# Patient Record
Sex: MALE | Race: BLACK OR AFRICAN AMERICAN | NOT HISPANIC OR LATINO | URBAN - METROPOLITAN AREA
[De-identification: names, ages, dates, MRNs, and addresses within clinical notes are randomized per-mention and may not be internally consistent; named-entity substitution may affect disease eponyms.]

---

## 2018-03-10 ENCOUNTER — INPATIENT (INPATIENT)
Facility: HOSPITAL | Age: 63
LOS: 112 days | DRG: 4 | End: 2018-07-01
Attending: INTERNAL MEDICINE | Admitting: INTERNAL MEDICINE
Payer: MEDICAID

## 2018-03-10 VITALS
RESPIRATION RATE: 16 BRPM | SYSTOLIC BLOOD PRESSURE: 92 MMHG | DIASTOLIC BLOOD PRESSURE: 68 MMHG | HEART RATE: 139 BPM | OXYGEN SATURATION: 97 % | TEMPERATURE: 99 F

## 2018-03-10 DIAGNOSIS — M10.9 GOUT, UNSPECIFIED: ICD-10-CM

## 2018-03-10 DIAGNOSIS — D63.1 ANEMIA IN CHRONIC KIDNEY DISEASE: ICD-10-CM

## 2018-03-10 DIAGNOSIS — I50.23 ACUTE ON CHRONIC SYSTOLIC (CONGESTIVE) HEART FAILURE: ICD-10-CM

## 2018-03-10 DIAGNOSIS — Z51.5 ENCOUNTER FOR PALLIATIVE CARE: ICD-10-CM

## 2018-03-10 DIAGNOSIS — E87.2 ACIDOSIS: ICD-10-CM

## 2018-03-10 DIAGNOSIS — I63.9 CEREBRAL INFARCTION, UNSPECIFIED: ICD-10-CM

## 2018-03-10 DIAGNOSIS — L98.499 NON-PRESSURE CHRONIC ULCER OF SKIN OF OTHER SITES WITH UNSPECIFIED SEVERITY: ICD-10-CM

## 2018-03-10 DIAGNOSIS — Z95.810 PRESENCE OF AUTOMATIC (IMPLANTABLE) CARDIAC DEFIBRILLATOR: ICD-10-CM

## 2018-03-10 DIAGNOSIS — I25.2 OLD MYOCARDIAL INFARCTION: ICD-10-CM

## 2018-03-10 DIAGNOSIS — R14.0 ABDOMINAL DISTENSION (GASEOUS): ICD-10-CM

## 2018-03-10 DIAGNOSIS — Z66 DO NOT RESUSCITATE: ICD-10-CM

## 2018-03-10 DIAGNOSIS — R06.82 TACHYPNEA, NOT ELSEWHERE CLASSIFIED: ICD-10-CM

## 2018-03-10 DIAGNOSIS — M72.6 NECROTIZING FASCIITIS: ICD-10-CM

## 2018-03-10 DIAGNOSIS — A41.9 SEPSIS, UNSPECIFIED ORGANISM: ICD-10-CM

## 2018-03-10 DIAGNOSIS — G93.41 METABOLIC ENCEPHALOPATHY: ICD-10-CM

## 2018-03-10 DIAGNOSIS — I25.10 ATHEROSCLEROTIC HEART DISEASE OF NATIVE CORONARY ARTERY WITHOUT ANGINA PECTORIS: ICD-10-CM

## 2018-03-10 DIAGNOSIS — R34 ANURIA AND OLIGURIA: ICD-10-CM

## 2018-03-10 DIAGNOSIS — L03.115 CELLULITIS OF RIGHT LOWER LIMB: ICD-10-CM

## 2018-03-10 DIAGNOSIS — J90 PLEURAL EFFUSION, NOT ELSEWHERE CLASSIFIED: ICD-10-CM

## 2018-03-10 DIAGNOSIS — R29.740: ICD-10-CM

## 2018-03-10 DIAGNOSIS — R57.0 CARDIOGENIC SHOCK: ICD-10-CM

## 2018-03-10 DIAGNOSIS — T50.3X5A ADVERSE EFFECT OF ELECTROLYTIC, CALORIC AND WATER-BALANCE AGENTS, INITIAL ENCOUNTER: ICD-10-CM

## 2018-03-10 DIAGNOSIS — E11.65 TYPE 2 DIABETES MELLITUS WITH HYPERGLYCEMIA: ICD-10-CM

## 2018-03-10 DIAGNOSIS — L89.153 PRESSURE ULCER OF SACRAL REGION, STAGE 3: ICD-10-CM

## 2018-03-10 DIAGNOSIS — J69.0 PNEUMONITIS DUE TO INHALATION OF FOOD AND VOMIT: ICD-10-CM

## 2018-03-10 DIAGNOSIS — M50.21 OTHER CERVICAL DISC DISPLACEMENT, HIGH CERVICAL REGION: ICD-10-CM

## 2018-03-10 DIAGNOSIS — F05 DELIRIUM DUE TO KNOWN PHYSIOLOGICAL CONDITION: ICD-10-CM

## 2018-03-10 DIAGNOSIS — I13.2 HYPERTENSIVE HEART AND CHRONIC KIDNEY DISEASE WITH HEART FAILURE AND WITH STAGE 5 CHRONIC KIDNEY DISEASE, OR END STAGE RENAL DISEASE: ICD-10-CM

## 2018-03-10 DIAGNOSIS — Z91.11 PATIENT'S NONCOMPLIANCE WITH DIETARY REGIMEN: ICD-10-CM

## 2018-03-10 DIAGNOSIS — R19.7 DIARRHEA, UNSPECIFIED: ICD-10-CM

## 2018-03-10 DIAGNOSIS — L03.116 CELLULITIS OF LEFT LOWER LIMB: ICD-10-CM

## 2018-03-10 DIAGNOSIS — I47.2 VENTRICULAR TACHYCARDIA: ICD-10-CM

## 2018-03-10 DIAGNOSIS — I95.2 HYPOTENSION DUE TO DRUGS: ICD-10-CM

## 2018-03-10 DIAGNOSIS — R56.9 UNSPECIFIED CONVULSIONS: ICD-10-CM

## 2018-03-10 DIAGNOSIS — Z79.4 LONG TERM (CURRENT) USE OF INSULIN: ICD-10-CM

## 2018-03-10 DIAGNOSIS — I25.5 ISCHEMIC CARDIOMYOPATHY: ICD-10-CM

## 2018-03-10 DIAGNOSIS — R53.1 WEAKNESS: ICD-10-CM

## 2018-03-10 DIAGNOSIS — K80.20 CALCULUS OF GALLBLADDER WITHOUT CHOLECYSTITIS WITHOUT OBSTRUCTION: ICD-10-CM

## 2018-03-10 DIAGNOSIS — B37.9 CANDIDIASIS, UNSPECIFIED: ICD-10-CM

## 2018-03-10 DIAGNOSIS — L89.893 PRESSURE ULCER OF OTHER SITE, STAGE 3: ICD-10-CM

## 2018-03-10 DIAGNOSIS — R53.2 FUNCTIONAL QUADRIPLEGIA: ICD-10-CM

## 2018-03-10 DIAGNOSIS — E80.6 OTHER DISORDERS OF BILIRUBIN METABOLISM: ICD-10-CM

## 2018-03-10 DIAGNOSIS — E87.5 HYPERKALEMIA: ICD-10-CM

## 2018-03-10 DIAGNOSIS — Z78.1 PHYSICAL RESTRAINT STATUS: ICD-10-CM

## 2018-03-10 DIAGNOSIS — M86.8X8 OTHER OSTEOMYELITIS, OTHER SITE: ICD-10-CM

## 2018-03-10 DIAGNOSIS — R45.1 RESTLESSNESS AND AGITATION: ICD-10-CM

## 2018-03-10 DIAGNOSIS — E11.51 TYPE 2 DIABETES MELLITUS WITH DIABETIC PERIPHERAL ANGIOPATHY WITHOUT GANGRENE: ICD-10-CM

## 2018-03-10 DIAGNOSIS — B96.89 OTHER SPECIFIED BACTERIAL AGENTS AS THE CAUSE OF DISEASES CLASSIFIED ELSEWHERE: ICD-10-CM

## 2018-03-10 DIAGNOSIS — Z74.01 BED CONFINEMENT STATUS: ICD-10-CM

## 2018-03-10 DIAGNOSIS — I48.91 UNSPECIFIED ATRIAL FIBRILLATION: ICD-10-CM

## 2018-03-10 DIAGNOSIS — R68.0 HYPOTHERMIA, NOT ASSOCIATED WITH LOW ENVIRONMENTAL TEMPERATURE: ICD-10-CM

## 2018-03-10 DIAGNOSIS — N17.9 ACUTE KIDNEY FAILURE, UNSPECIFIED: ICD-10-CM

## 2018-03-10 DIAGNOSIS — E43 UNSPECIFIED SEVERE PROTEIN-CALORIE MALNUTRITION: ICD-10-CM

## 2018-03-10 DIAGNOSIS — R65.21 SEVERE SEPSIS WITH SEPTIC SHOCK: ICD-10-CM

## 2018-03-10 DIAGNOSIS — J96.20 ACUTE AND CHRONIC RESPIRATORY FAILURE, UNSPECIFIED WHETHER WITH HYPOXIA OR HYPERCAPNIA: ICD-10-CM

## 2018-03-10 DIAGNOSIS — G81.94 HEMIPLEGIA, UNSPECIFIED AFFECTING LEFT NONDOMINANT SIDE: ICD-10-CM

## 2018-03-10 DIAGNOSIS — F43.21 ADJUSTMENT DISORDER WITH DEPRESSED MOOD: ICD-10-CM
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
## 2018-03-10 DIAGNOSIS — N17.0 ACUTE KIDNEY FAILURE WITH TUBULAR NECROSIS: ICD-10-CM

## 2018-03-10 DIAGNOSIS — R15.9 FULL INCONTINENCE OF FECES: ICD-10-CM

## 2018-03-10 DIAGNOSIS — L89.154 PRESSURE ULCER OF SACRAL REGION, STAGE 4: ICD-10-CM

## 2018-03-10 DIAGNOSIS — N18.6 END STAGE RENAL DISEASE: ICD-10-CM

## 2018-03-10 LAB
ALBUMIN SERPL ELPH-MCNC: 2.2 G/DL — LOW (ref 3.3–5)
ALBUMIN SERPL ELPH-MCNC: 2.3 G/DL — LOW (ref 3.3–5)
ALBUMIN SERPL ELPH-MCNC: 2.8 G/DL — LOW (ref 3.3–5)
ALP SERPL-CCNC: 153 U/L — HIGH (ref 40–120)
ALP SERPL-CCNC: 154 U/L — HIGH (ref 40–120)
ALP SERPL-CCNC: 213 U/L — HIGH (ref 40–120)
ALT FLD-CCNC: 13 U/L — SIGNIFICANT CHANGE UP (ref 10–45)
ALT FLD-CCNC: 15 U/L — SIGNIFICANT CHANGE UP (ref 10–45)
ALT FLD-CCNC: 15 U/L — SIGNIFICANT CHANGE UP (ref 10–45)
ANION GAP SERPL CALC-SCNC: 20 MMOL/L — HIGH (ref 5–17)
ANION GAP SERPL CALC-SCNC: 21 MMOL/L — HIGH (ref 5–17)
ANION GAP SERPL CALC-SCNC: 23 MMOL/L — HIGH (ref 5–17)
APPEARANCE UR: (no result)
APTT BLD: 39.5 SEC — HIGH (ref 27.5–37.4)
AST SERPL-CCNC: 17 U/L — SIGNIFICANT CHANGE UP (ref 10–40)
AST SERPL-CCNC: 22 U/L — SIGNIFICANT CHANGE UP (ref 10–40)
AST SERPL-CCNC: 23 U/L — SIGNIFICANT CHANGE UP (ref 10–40)
BACTERIA # UR AUTO: PRESENT /HPF
BASE EXCESS BLDA CALC-SCNC: -8.1 MMOL/L — LOW (ref -2–3)
BASE EXCESS BLDV CALC-SCNC: -8.2 MMOL/L — SIGNIFICANT CHANGE UP
BASOPHILS NFR BLD AUTO: 0.1 % — SIGNIFICANT CHANGE UP (ref 0–2)
BILIRUB DIRECT SERPL-MCNC: 2 MG/DL — HIGH (ref 0–0.2)
BILIRUB INDIRECT FLD-MCNC: 1.4 MG/DL — HIGH (ref 0.2–1)
BILIRUB SERPL-MCNC: 3.4 MG/DL — HIGH (ref 0.2–1.2)
BILIRUB SERPL-MCNC: 3.4 MG/DL — HIGH (ref 0.2–1.2)
BILIRUB SERPL-MCNC: 3.6 MG/DL — HIGH (ref 0.2–1.2)
BILIRUB SERPL-MCNC: 3.7 MG/DL — HIGH (ref 0.2–1.2)
BILIRUB UR-MCNC: (no result)
BUN SERPL-MCNC: 79 MG/DL — HIGH (ref 7–23)
BUN SERPL-MCNC: 80 MG/DL — HIGH (ref 7–23)
BUN SERPL-MCNC: 82 MG/DL — HIGH (ref 7–23)
CALCIUM SERPL-MCNC: 8.4 MG/DL — SIGNIFICANT CHANGE UP (ref 8.4–10.5)
CALCIUM SERPL-MCNC: 8.6 MG/DL — SIGNIFICANT CHANGE UP (ref 8.4–10.5)
CALCIUM SERPL-MCNC: 9.3 MG/DL — SIGNIFICANT CHANGE UP (ref 8.4–10.5)
CHLORIDE SERPL-SCNC: 95 MMOL/L — LOW (ref 96–108)
CHLORIDE SERPL-SCNC: 96 MMOL/L — SIGNIFICANT CHANGE UP (ref 96–108)
CHLORIDE SERPL-SCNC: 99 MMOL/L — SIGNIFICANT CHANGE UP (ref 96–108)
CHLORIDE UR-SCNC: <20 MMOL/L — SIGNIFICANT CHANGE UP
CK MB CFR SERPL CALC: 1.7 NG/ML — SIGNIFICANT CHANGE UP (ref 0–6.7)
CK MB CFR SERPL CALC: 2.3 NG/ML — SIGNIFICANT CHANGE UP (ref 0–6.7)
CK SERPL-CCNC: 283 U/L — HIGH (ref 30–200)
CK SERPL-CCNC: 380 U/L — HIGH (ref 30–200)
CO2 SERPL-SCNC: 15 MMOL/L — LOW (ref 22–31)
CO2 SERPL-SCNC: 15 MMOL/L — LOW (ref 22–31)
CO2 SERPL-SCNC: 17 MMOL/L — LOW (ref 22–31)
COLOR SPEC: YELLOW — SIGNIFICANT CHANGE UP
COMMENT - URINE: SIGNIFICANT CHANGE UP
CREAT ?TM UR-MCNC: 436 MG/DL — SIGNIFICANT CHANGE UP
CREAT SERPL-MCNC: 3.9 MG/DL — HIGH (ref 0.5–1.3)
CREAT SERPL-MCNC: 3.92 MG/DL — HIGH (ref 0.5–1.3)
CREAT SERPL-MCNC: 3.93 MG/DL — HIGH (ref 0.5–1.3)
DIFF PNL FLD: (no result)
EOSINOPHIL NFR BLD AUTO: 0 % — SIGNIFICANT CHANGE UP (ref 0–6)
EPI CELLS # UR: (no result) /HPF (ref 0–5)
GAS PNL BLDA: SIGNIFICANT CHANGE UP
GAS PNL BLDV: SIGNIFICANT CHANGE UP
GLUCOSE BLDC GLUCOMTR-MCNC: 230 MG/DL — HIGH (ref 70–99)
GLUCOSE BLDC GLUCOMTR-MCNC: 262 MG/DL — HIGH (ref 70–99)
GLUCOSE BLDC GLUCOMTR-MCNC: 268 MG/DL — HIGH (ref 70–99)
GLUCOSE BLDC GLUCOMTR-MCNC: 269 MG/DL — HIGH (ref 70–99)
GLUCOSE SERPL-MCNC: 292 MG/DL — HIGH (ref 70–99)
GLUCOSE SERPL-MCNC: 309 MG/DL — HIGH (ref 70–99)
GLUCOSE SERPL-MCNC: 317 MG/DL — HIGH (ref 70–99)
GLUCOSE UR QL: 100
HCO3 BLDA-SCNC: 15 MMOL/L — LOW (ref 21–28)
HCO3 BLDV-SCNC: 17 MMOL/L — LOW (ref 20–27)
HCT VFR BLD CALC: 35.3 % — LOW (ref 39–50)
HGB BLD-MCNC: 11.5 G/DL — LOW (ref 13–17)
INR BLD: 2.49 — HIGH (ref 0.88–1.16)
KETONES UR-MCNC: (no result) MG/DL
LACTATE SERPL-SCNC: 3.9 MMOL/L — HIGH (ref 0.5–2)
LACTATE SERPL-SCNC: 3.9 MMOL/L — HIGH (ref 0.5–2)
LACTATE SERPL-SCNC: 6.6 MMOL/L — CRITICAL HIGH (ref 0.5–2)
LACTATE SERPL-SCNC: 6.7 MMOL/L — CRITICAL HIGH (ref 0.5–2)
LEUKOCYTE ESTERASE UR-ACNC: NEGATIVE — SIGNIFICANT CHANGE UP
LYMPHOCYTES # BLD AUTO: 2 % — LOW (ref 13–44)
MCHC RBC-ENTMCNC: 29 PG — SIGNIFICANT CHANGE UP (ref 27–34)
MCHC RBC-ENTMCNC: 32.6 G/DL — SIGNIFICANT CHANGE UP (ref 32–36)
MCV RBC AUTO: 88.9 FL — SIGNIFICANT CHANGE UP (ref 80–100)
MONOCYTES NFR BLD AUTO: 1.9 % — LOW (ref 2–14)
NEUTROPHILS NFR BLD AUTO: 96 % — HIGH (ref 43–77)
NITRITE UR-MCNC: NEGATIVE — SIGNIFICANT CHANGE UP
NT-PROBNP SERPL-SCNC: HIGH PG/ML (ref 0–300)
PCO2 BLDA: 25 MMHG — LOW (ref 35–48)
PCO2 BLDV: 34 MMHG — LOW (ref 41–51)
PH BLDA: 7.4 — SIGNIFICANT CHANGE UP (ref 7.35–7.45)
PH BLDV: 7.32 — SIGNIFICANT CHANGE UP (ref 7.32–7.43)
PH UR: 5 — SIGNIFICANT CHANGE UP (ref 5–8)
PLATELET # BLD AUTO: 219 K/UL — SIGNIFICANT CHANGE UP (ref 150–400)
PO2 BLDA: 189 MMHG — HIGH (ref 83–108)
PO2 BLDV: 26 MMHG — SIGNIFICANT CHANGE UP
POTASSIUM SERPL-MCNC: 5.7 MMOL/L — HIGH (ref 3.5–5.3)
POTASSIUM SERPL-MCNC: 5.7 MMOL/L — HIGH (ref 3.5–5.3)
POTASSIUM SERPL-MCNC: 6 MMOL/L — HIGH (ref 3.5–5.3)
POTASSIUM SERPL-SCNC: 5.7 MMOL/L — HIGH (ref 3.5–5.3)
POTASSIUM SERPL-SCNC: 5.7 MMOL/L — HIGH (ref 3.5–5.3)
POTASSIUM SERPL-SCNC: 6 MMOL/L — HIGH (ref 3.5–5.3)
POTASSIUM UR-SCNC: 83 MMOL/L — SIGNIFICANT CHANGE UP
PROT SERPL-MCNC: 6.7 G/DL — SIGNIFICANT CHANGE UP (ref 6–8.3)
PROT SERPL-MCNC: 7 G/DL — SIGNIFICANT CHANGE UP (ref 6–8.3)
PROT SERPL-MCNC: 8.4 G/DL — HIGH (ref 6–8.3)
PROT UR-MCNC: 30 MG/DL
PROTHROM AB SERPL-ACNC: 28.2 SEC — HIGH (ref 9.8–12.7)
RAPID RVP RESULT: SIGNIFICANT CHANGE UP
RBC # BLD: 3.97 M/UL — LOW (ref 4.2–5.8)
RBC # FLD: 14.9 % — SIGNIFICANT CHANGE UP (ref 10.3–16.9)
RBC CASTS # UR COMP ASSIST: (no result) /HPF
SAO2 % BLDA: 99 % — SIGNIFICANT CHANGE UP (ref 95–100)
SAO2 % BLDV: 31 % — SIGNIFICANT CHANGE UP
SODIUM SERPL-SCNC: 131 MMOL/L — LOW (ref 135–145)
SODIUM SERPL-SCNC: 135 MMOL/L — SIGNIFICANT CHANGE UP (ref 135–145)
SODIUM SERPL-SCNC: 135 MMOL/L — SIGNIFICANT CHANGE UP (ref 135–145)
SODIUM UR-SCNC: <20 MMOL/L — SIGNIFICANT CHANGE UP
SP GR SPEC: >=1.03 — SIGNIFICANT CHANGE UP (ref 1–1.03)
TROPONIN T SERPL-MCNC: 0.08 NG/ML — CRITICAL HIGH (ref 0–0.01)
TROPONIN T SERPL-MCNC: 0.08 NG/ML — CRITICAL HIGH (ref 0–0.01)
UROBILINOGEN FLD QL: 2 E.U./DL
WBC # BLD: 32.8 K/UL — HIGH (ref 3.8–10.5)
WBC # FLD AUTO: 32.8 K/UL — HIGH (ref 3.8–10.5)
WBC UR QL: < 5 /HPF — SIGNIFICANT CHANGE UP

## 2018-03-10 PROCEDURE — 73590 X-RAY EXAM OF LOWER LEG: CPT | Mod: 26,50

## 2018-03-10 PROCEDURE — 93010 ELECTROCARDIOGRAM REPORT: CPT

## 2018-03-10 PROCEDURE — 74176 CT ABD & PELVIS W/O CONTRAST: CPT | Mod: 26

## 2018-03-10 PROCEDURE — 99291 CRITICAL CARE FIRST HOUR: CPT | Mod: 25

## 2018-03-10 PROCEDURE — 73700 CT LOWER EXTREMITY W/O DYE: CPT | Mod: 26,50

## 2018-03-10 PROCEDURE — 99291 CRITICAL CARE FIRST HOUR: CPT

## 2018-03-10 PROCEDURE — 71045 X-RAY EXAM CHEST 1 VIEW: CPT | Mod: 26

## 2018-03-10 RX ORDER — WARFARIN SODIUM 2.5 MG/1
1 TABLET ORAL
Qty: 0 | Refills: 0 | COMMUNITY

## 2018-03-10 RX ORDER — METOPROLOL TARTRATE 50 MG
1 TABLET ORAL
Qty: 0 | Refills: 0 | COMMUNITY

## 2018-03-10 RX ORDER — BENZOCAINE AND MENTHOL 5; 1 G/100ML; G/100ML
1 LIQUID ORAL EVERY 4 HOURS
Qty: 0 | Refills: 0 | Status: DISCONTINUED | OUTPATIENT
Start: 2018-03-10 | End: 2018-03-16

## 2018-03-10 RX ORDER — ALBUMIN HUMAN 25 %
250 VIAL (ML) INTRAVENOUS ONCE
Qty: 0 | Refills: 0 | Status: COMPLETED | OUTPATIENT
Start: 2018-03-10 | End: 2018-03-10

## 2018-03-10 RX ORDER — SODIUM CHLORIDE 9 MG/ML
1000 INJECTION INTRAMUSCULAR; INTRAVENOUS; SUBCUTANEOUS ONCE
Qty: 0 | Refills: 0 | Status: COMPLETED | OUTPATIENT
Start: 2018-03-10 | End: 2018-03-10

## 2018-03-10 RX ORDER — ACETAMINOPHEN 500 MG
975 TABLET ORAL ONCE
Qty: 0 | Refills: 0 | Status: COMPLETED | OUTPATIENT
Start: 2018-03-10 | End: 2018-03-10

## 2018-03-10 RX ORDER — SODIUM CHLORIDE 9 MG/ML
500 INJECTION INTRAMUSCULAR; INTRAVENOUS; SUBCUTANEOUS ONCE
Qty: 0 | Refills: 0 | Status: COMPLETED | OUTPATIENT
Start: 2018-03-10 | End: 2018-03-10

## 2018-03-10 RX ORDER — THIAMINE MONONITRATE (VIT B1) 100 MG
100 TABLET ORAL DAILY
Qty: 0 | Refills: 0 | Status: DISCONTINUED | OUTPATIENT
Start: 2018-03-10 | End: 2018-05-16

## 2018-03-10 RX ORDER — SITAGLIPTIN 50 MG/1
1 TABLET, FILM COATED ORAL
Qty: 0 | Refills: 0 | COMMUNITY

## 2018-03-10 RX ORDER — SODIUM BICARBONATE 1 MEQ/ML
50 SYRINGE (ML) INTRAVENOUS ONCE
Qty: 0 | Refills: 0 | Status: COMPLETED | OUTPATIENT
Start: 2018-03-10 | End: 2018-03-10

## 2018-03-10 RX ORDER — SODIUM CHLORIDE 9 MG/ML
3 INJECTION INTRAMUSCULAR; INTRAVENOUS; SUBCUTANEOUS ONCE
Qty: 0 | Refills: 0 | Status: COMPLETED | OUTPATIENT
Start: 2018-03-10 | End: 2018-03-10

## 2018-03-10 RX ORDER — PIPERACILLIN AND TAZOBACTAM 4; .5 G/20ML; G/20ML
2.25 INJECTION, POWDER, LYOPHILIZED, FOR SOLUTION INTRAVENOUS EVERY 8 HOURS
Qty: 0 | Refills: 0 | Status: DISCONTINUED | OUTPATIENT
Start: 2018-03-10 | End: 2018-03-12

## 2018-03-10 RX ORDER — PIPERACILLIN AND TAZOBACTAM 4; .5 G/20ML; G/20ML
3.38 INJECTION, POWDER, LYOPHILIZED, FOR SOLUTION INTRAVENOUS ONCE
Qty: 0 | Refills: 0 | Status: COMPLETED | OUTPATIENT
Start: 2018-03-10 | End: 2018-03-10

## 2018-03-10 RX ORDER — INSULIN HUMAN 100 [IU]/ML
1 INJECTION, SOLUTION SUBCUTANEOUS
Qty: 100 | Refills: 0 | Status: DISCONTINUED | OUTPATIENT
Start: 2018-03-10 | End: 2018-03-11

## 2018-03-10 RX ORDER — HEPARIN SODIUM 5000 [USP'U]/ML
5000 INJECTION INTRAVENOUS; SUBCUTANEOUS EVERY 8 HOURS
Qty: 0 | Refills: 0 | Status: DISCONTINUED | OUTPATIENT
Start: 2018-03-10 | End: 2018-03-12

## 2018-03-10 RX ORDER — CLOPIDOGREL BISULFATE 75 MG/1
1 TABLET, FILM COATED ORAL
Qty: 0 | Refills: 0 | COMMUNITY

## 2018-03-10 RX ORDER — VANCOMYCIN HCL 1 G
1000 VIAL (EA) INTRAVENOUS ONCE
Qty: 0 | Refills: 0 | Status: COMPLETED | OUTPATIENT
Start: 2018-03-10 | End: 2018-03-10

## 2018-03-10 RX ADMIN — PIPERACILLIN AND TAZOBACTAM 200 GRAM(S): 4; .5 INJECTION, POWDER, LYOPHILIZED, FOR SOLUTION INTRAVENOUS at 13:46

## 2018-03-10 RX ADMIN — SODIUM CHLORIDE 1000 MILLILITER(S): 9 INJECTION INTRAMUSCULAR; INTRAVENOUS; SUBCUTANEOUS at 19:14

## 2018-03-10 RX ADMIN — SODIUM CHLORIDE 1000 MILLILITER(S): 9 INJECTION INTRAMUSCULAR; INTRAVENOUS; SUBCUTANEOUS at 13:50

## 2018-03-10 RX ADMIN — HEPARIN SODIUM 5000 UNIT(S): 5000 INJECTION INTRAVENOUS; SUBCUTANEOUS at 21:26

## 2018-03-10 RX ADMIN — SODIUM CHLORIDE 1000 MILLILITER(S): 9 INJECTION INTRAMUSCULAR; INTRAVENOUS; SUBCUTANEOUS at 13:14

## 2018-03-10 RX ADMIN — INSULIN HUMAN 1 UNIT(S)/HR: 100 INJECTION, SOLUTION SUBCUTANEOUS at 21:25

## 2018-03-10 RX ADMIN — Medication 50 MILLIEQUIVALENT(S): at 21:50

## 2018-03-10 RX ADMIN — SODIUM CHLORIDE 500 MILLILITER(S): 9 INJECTION INTRAMUSCULAR; INTRAVENOUS; SUBCUTANEOUS at 14:20

## 2018-03-10 RX ADMIN — Medication 250 MILLIGRAM(S): at 14:20

## 2018-03-10 RX ADMIN — SODIUM CHLORIDE 3 MILLILITER(S): 9 INJECTION INTRAMUSCULAR; INTRAVENOUS; SUBCUTANEOUS at 13:14

## 2018-03-10 RX ADMIN — SODIUM CHLORIDE 1000 MILLILITER(S): 9 INJECTION INTRAMUSCULAR; INTRAVENOUS; SUBCUTANEOUS at 14:49

## 2018-03-10 RX ADMIN — Medication 125 MILLILITER(S): at 21:01

## 2018-03-10 RX ADMIN — Medication 975 MILLIGRAM(S): at 13:30

## 2018-03-10 RX ADMIN — PIPERACILLIN AND TAZOBACTAM 200 GRAM(S): 4; .5 INJECTION, POWDER, LYOPHILIZED, FOR SOLUTION INTRAVENOUS at 20:40

## 2018-03-10 RX ADMIN — BENZOCAINE AND MENTHOL 1 LOZENGE: 5; 1 LIQUID ORAL at 22:55

## 2018-03-10 NOTE — ED PROVIDER NOTE - CARE PLAN
Principal Discharge DX:	Sepsis, due to unspecified organism  Secondary Diagnosis:	Cellulitis of lower extremity, unspecified laterality  Secondary Diagnosis:	Acute renal failure, unspecified acute renal failure type

## 2018-03-10 NOTE — ED PROVIDER NOTE - SECONDARY DIAGNOSIS.
Cellulitis of lower extremity, unspecified laterality Acute renal failure, unspecified acute renal failure type

## 2018-03-10 NOTE — H&P ADULT - HISTORY OF PRESENT ILLNESS
64 yo M history of HFrEF 10-15% 2/2 ischemic cardiomyopathy, MI , s/p AICD vs PPM, ?Afib, Hypertension, Diabetes Mellitus Type 2 on insulin, CKD?and gout, who presents with a chief complaint of generalized weakness. Pt giana admitted to [facility] 2 months ago for gout and was sent to rehab. He was discharged home mid Feb with VNS. In the past 2 weeks patient has noted increased leg pain and weakness bilaterally. In the last few days, he has also experienced generalized weakness prompting him to come to ER.   In ER, noted to have t max of 102, SBP 70s, leukocytosis to 32.8, Lactate of 6.6, Acute renal failure with severe metabolic derangements. Given 3.5L fluids and Vanc/Zosyn. MICU consulted.

## 2018-03-10 NOTE — CONSULT NOTE ADULT - ASSESSMENT
62YO M with hx of MI, CHF EF(10-15%), DM type 2, HTN, CKD, Gout p/w septic shock and bilateral LL cellulitis, tachycardic, WBC 32, lactate 6. CT of LL did not revealed any gas/collections. Admitted to MICU for further management.  CNS : Pain control  CVS : Aim for normotensive  GI : NPO/IVF   : Strict I/Os, barnes (3/10-), has GILMER on CKD with creatinine of 3.9 on admission.   AM labs   Pending final read – CT, X-Ray  Start IV antibiotics as per MICU protocol, follow up blood cultures. Not for any surgical intervention.   Vascular will follow.   Plan d/w Chief and

## 2018-03-10 NOTE — ED ADULT NURSE NOTE - OBJECTIVE STATEMENT
63y M, Alert and oriented, poor historian, arrived via ems from home for lethargy for "past few days" Reports "he was treated for cellulitis and is on some antibiotic. Upon arrival pt was upgraded due to abnormal vitals to MD Benjamin. PT is poor historian, reports unknown antibiotic for cellulitis of lower extremities. No cp nor sob, denies abdominal pain nor urinary s/s. Noted bilateral lower extremity swelling with increased erythema to left lower extremity. Poor effort on auscultation of lungs, no crackles noted. No jvd. IV access and fluids infusing, delay in antibiotics due to difficult access and blood draw for blood culture. Access later obtained and IV fluids with antibiotics infusing as prescribed. EKG performed. Encouraged urine however pt reports "I cant go yet" At the time pt cannot recall full medical history nor medications. Will continue to monitor.

## 2018-03-10 NOTE — ED PROVIDER NOTE - MEDICAL DECISION MAKING DETAILS
Impression: severe sepsis 2/2 leg cellulitis. Febrile to 102 w/ associated tachycardia, borderline bp. Pt stating baseline systolic bp ~ 90. Pt tx'd for severe sepsis w/ iv vanc/zosyn, as well as ivf boluses. MS appears to be improving and pt more alert and responsive. Pt seen by icu; ? ruq tenderness on exam which may be 2/2 pleural effusion. Recommending non-cont ct a/p in addition to ct lower ext. Vascular consult requested for evaluation of lower ext cellulitis. Will place barnes catheter to assess urine outpt. Will admit to MICU for further w/u and management.

## 2018-03-10 NOTE — H&P ADULT - ASSESSMENT
62 yo M history of HFrEF 10-15% 2/2 ischemic cardiomyopathy, MI , s/p AICD vs PPM, ?Afib, Hypertension, Diabetes Mellitus Type 2 on insulin, CKD?and gout, who presents with a chief complaint of generalized weakness admitted for severe sepsis 2/2 LE cellulitis    #NEURO  - Appears weak and responds slowly but AOx3. Likely 2/2 severe sepsis. wIll continue monitoring mental status      #CARDIAC  # CHF- Hx of CHF with EF 10-15% per pt 2/2 ischemic cardiomayopathy. s/p ICD vs PPM.    - Elevated BNP on admission with R pleural effusion.  - Unclear medical regimen opt. Per [facility] pharmacy ([address] and [place]), pt has not picked up Torsemide 20 or Metoprolol 100 since November.   - TTE         #RESPIRATORY   #RENAL   #ID   #ENDOCRINE 64 yo M history of HFrEF 10-15% 2/2 ischemic cardiomyopathy, MI , s/p AICD vs PPM, ?Afib, Hypertension, Diabetes Mellitus Type 2 on insulin, CKD?and gout, who presents with a chief complaint of generalized weakness admitted for severe sepsis 2/2 LE cellulitis vs pneumonia     #NEURO  - Appears weak and responds slowly but AOx3. Likely 2/2 severe sepsis. wIll continue monitoring mental status      #CARDIAC    # HYPOTENSION   - 2/2 sepsis   - Pt hypotensive on admission s/p 3.5L NS and given 500cc bolus on the floor. COntinues to have elevated Lactate.  - Maintain MAP >60  - Per patient Hx of CHF with EF of 10-15%,    # CHF- Hx of CHF with EF 10-15% per pt 2/2 ischemic cardiomayopathy. s/p ICD vs PPM.    - Elevated BNP on admission. Pt hypovolemic on examination.  - Unclear medical regimen opt. Per [facility] ([address] and [place] pt has not picked up Torsemide 20 or Metoprolol 100 since November.   - f/u TTE    #AFIB- Unclear if hx of Afib. Per pharmacy pt on coumadin. Pt currently in Sinus rhythm.      #CAD- Hx of MI s/p AICD vs PPI- Per pharmacy pt has not picked up Plavix since November. Confirm with opt cardiologist     #RESPIRATORY   - pt with R loculated plural effusion noted to have increased work of breathing. Starting on High flow NC @40%.   - WIll discuss thoracocentesis with Pulmonary team     #RENAL   -Unknown baseline Cr presenting with Cr 3.93 with metabolic derangements.   - Likely 2/2 severe sepsis  - s/p 4L fluids. f/u repeat BMP     #ID   - Severe sepsis 2/2 cellulitis vs. Pneumonia    #ENDOCRINE   - Elevated GLucose above 300. Anion gap elevated however likely 2/2 Ureamia and Lactate. Ketones in urine likely 2/2 Starvation.   - Will start on Insulin drip at 1U/hour 64 yo M history of HFrEF 10-15% 2/2 ischemic cardiomyopathy, MI , s/p AICD vs PPM, ?Afib, Hypertension, Diabetes Mellitus Type 2 on insulin, CKD?and gout, who presents with a chief complaint of generalized weakness admitted for severe sepsis 2/2 LE cellulitis vs pneumonia     #NEURO  - Appears weak and responds slowly but AOx3. Likely 2/2 severe sepsis. wIll continue monitoring mental status      #CARDIAC    # HYPOTENSION   - 2/2 sepsis   - Pt hypotensive on admission s/p 3.5L NS and given 500cc bolus on the floor. COntinues to have elevated Lactate.  - Maintain MAP >60  - Per patient Hx of CHF with EF of 10-15%,, give fluids cautiously to prevent overload.     # CHF exacerbation- Hx of CHF with EF 10-15% per pt 2/2 ischemic cardiomayopathy. s/p ICD vs PPM.    - Elevated BNP on admission with R sided effusion and edema.   - Give fluids judiciously given low EF.  - Unclear medical regimen opt. Per [facility] ([address] and [place] pt has not picked up Torsemide 20 or Metoprolol 100 since November.   - f/u TTE    #AFIB- Unclear if hx of Afib. Per pharmacy pt on coumadin. Pt currently in Sinus rhythm.      #CAD- Hx of MI s/p AICD vs PPI- Per pharmacy pt has not picked up Plavix since November. Confirm with opt cardiologist     #RESPIRATORY   - pt with R loculated plural effusion noted to have increased work of breathing. Starting on High flow NC @40%.   - WIll discuss thoracocentesis with Pulmonary team     #RENAL   -Unknown baseline Cr presenting with Cr 3.93 with metabolic derangements.   - Likely 2/2 severe sepsis  - s/p 4L fluids. f/u repeat BMP     #GI   - pt wit Elevated Bilrirubin and Alk phos   - Ct abd/ pelvis     #METABOLIC   #Hyperkalemia   - PT with elevated K to 5.7, no EKG changes   -Trend    #Metabolic acidosis   - 2/2 Lactate, starvation ketoacidosis and uremia,   - s/p resucitation. Lactate trending down. Trend to lactate <2     #ID   - Severe sepsis 2/2 cellulitis   - Given hospil    #ENDOCRINE   - Elevated GLucose above 300. Anion gap elevated however likely 2/2 Ureamia and Lactate. Ketones in urine likely 2/2 Starvation.   - Will start on Insulin drip at 1U/hour 64 yo M history of HFrEF 10-15% 2/2 ischemic cardiomyopathy, MI , s/p AICD vs PPM, ?Afib, Hypertension, Diabetes Mellitus Type 2 on insulin, CKD?and gout, who presents with a chief complaint of generalized weakness admitted for severe sepsis 2/2 LE cellulitis vs pneumonia     #NEURO  - Appears weak and responds slowly but AOx3. Likely 2/2 severe sepsis. wIll continue monitoring mental status      #CARDIAC    # HYPOTENSION   - 2/2 sepsis   - Pt hypotensive on admission s/p 3.5L NS and given 500cc bolus on the floor. COntinues to have elevated Lactate.  - Maintain MAP >60  - Per patient Hx of CHF with EF of 10-15%,, give fluids cautiously to prevent overload.     # CHF exacerbation- Hx of CHF with EF 10-15% per pt 2/2 ischemic cardiomayopathy. s/p ICD vs PPM.    - Elevated BNP on admission with R sided effusion and edema.   - Give fluids judiciously given low EF.  - Unclear medical regimen opt. Per [facility] ([address] and [place] pt has not picked up Torsemide 20 or Metoprolol 100 since November.   - f/u TTE    #AFIB?- Unclear if hx of Afib. Per pharmacy pt on coumadin. Pt currently in Sinus rhythm.      #CAD- Hx of MI s/p AICD vs PPI- Per pharmacy pt has not picked up Plavix since November. Confirm with opt cardiologist     #RESPIRATORY   - pt with R loculated plural effusion noted to have increased work of breathing. Starting on High flow NC @40%.   - WIll discuss thoracocentesis with Pulmonary team     #RENAL   -Unknown baseline Cr presenting with Cr 3.93 with metabolic derangements.   - Likely 2/2 severe sepsis  - s/p 4L fluids. f/u repeat BMP     #GI   - pt wit Elevated Bilrirubin and Alk phos   - Ct abd/ pelvis     #METABOLIC   #Hyperkalemia   - PT with elevated K to 5.7, no EKG changes   -Trend K     #Metabolic acidosis   - 2/2 Lactate, starvation ketoacidosis and uremia,   - s/p resuscitation Lactate trending down. Trend to lactate <2     #ID   - Severe sepsis 2/2 cellulitis   - Given recent hospitalization. Will continue with Vanc and Zosyn (renally dosed)      #ENDOCRINE   - Elevated Glucose above 300. Anion gap elevated however likely 2/2 Uremia and Lactate. Ketones in urine likely 2/2 Starvation.   - Will start on Insulin drip at 1U/hour 64 yo M history of HFrEF 10-15% 2/2 ischemic cardiomyopathy, MI , s/p AICD vs PPM, ?Afib, Hypertension, Diabetes Mellitus Type 2 on insulin, CKD?and gout, who presents with a chief complaint of generalized weakness admitted for severe sepsis 2/2 LE cellulitis vs pneumonia     #NEURO  - Appears weak and responds slowly but AOx3. Likely 2/2 severe sepsis. wIll continue monitoring mental status      #CARDIOVASCULAR     # HYPOTENSION   - 2/2 sepsis   - Pt hypotensive on admission s/p 3.5L NS and given 500cc bolus on the floor. Continues to have elevated Lactate.  - Maintain MAP >60  - Per patient Hx of CHF with EF of 10-15%,, give fluids cautiously to prevent overload.     # CHF exacerbation- Hx of CHF with EF 10-15% per pt 2/2 ischemic cardiomayopathy. s/p ICD vs PPM.    - Elevated BNP on admission with R sided effusion and edema.   - Give fluids judiciously given low EF.  - Unclear medical regimen opt. Per [facility] pharmacy ( and [place]) pt has not picked up Torsemide 20 or Metoprolol 100 since November.   - f/u TTE    #AFIB?- Unclear if hx of Afib. Per pharmacy pt on coumadin. Pt currently in Sinus rhythm.      #CAD- Hx of MI s/p AICD vs PPI- Per pharmacy pt has not picked up Plavix since November. Confirm with opt cardiologist     # LE Edema   - LE edema with chronic venous stasis.   - WIll r/o DVT. f/u Duplex    #RESPIRATORY   - pt with R loculated plural effusion noted to have increased work of breathing. Likely 2/2 fluid overload, low suspicion for pneumonia   - Starting on High flow NC @40% given increased work of breathing.   - Monitor resp status   - WIll discuss thoracocentesis with Pulmonary team     #RENAL   #ARF-  -Unknown baseline Cr presenting with Cr 3.93 with metabolic derangements.   - Likely 2/2 Sepsis, low intravascular volume and CHF  -Check UA, Urine Lytes. Trend BUN and Cr.  - Likely 2/2 severe sepsis  - s/p 4L fluids. f/u repeat BMP   - f/u CT ABd pelvis to r/o obstruction     #Hyperkalemia   - PT with elevated K to 5.7, no EKG changes   -Trend K   - Can given Kayexalate if persists     #Metabolic acidosis   - 2/2 Lactate, starvation ketoacidosis and uremia,   - s/p resuscitation Lactate trending down. Trend to lactate <2     #ID   - Severe sepsis 2/2 cellulitis. Less likely Cholecystitis.    - Given recent hospitalization. Will continue with Vanc and Zosyn (renally dosed)  - R lung Effusion likely from overload, low suspicion for infection     #GI   - pt wit Elevated Bilrirubin and Alk phos. Abd exam benign. WIll r/o cholecystitis.   - Ct abd/ pelvis       #ENDOCRINE   - Elevated Glucose above 300. Anion gap elevated however likely 2/2 Uremia and Lactate. Ketones in urine likely 2/2 Starvation.   - Will start on Insulin drip at 1U/hour   - Monitor blood glucose     F: No IVF. s/p 4L NS    E: Pt Hyperkalemic 2/2 ARF   N: Renal Diet     Drips: Injsulin     Full code   MICU 62 yo M history of HFrEF 10-15% 2/2 ischemic cardiomyopathy, MI , s/p AICD vs PPM, ?Afib, Hypertension, Diabetes Mellitus Type 2 on insulin, CKD?and gout, who presents with a chief complaint of generalized weakness admitted for severe sepsis 2/2 LE cellulitis vs pneumonia     #NEURO  - Appears weak and responds slowly but AOx3. Likely 2/2 severe sepsis. wIll continue monitoring mental status      #CARDIOVASCULAR     # HYPOTENSION   - 2/2 sepsis   - Pt hypotensive on admission s/p 3.5L NS and given 500cc bolus on the floor. Continues to have elevated Lactate.  - Maintain MAP >60  - Per patient Hx of CHF with EF of 10-15%,, give fluids cautiously to prevent overload.     # CHF exacerbation- Hx of CHF with EF 10-15% per pt 2/2 ischemic cardiomayopathy. s/p ICD vs PPM.    - Elevated BNP on admission with R sided effusion and edema.   - Give fluids judiciously given low EF.  - Unclear medical regimen opt. Per [facility] pharmacy ( and [place]) pt has not picked up Torsemide 20 or Metoprolol 100 since November.   - f/u TTE  - Hold ACE/Metoprolol     #AFIB?- Unclear if hx of Afib. Per pharmacy pt on coumadin. Pt currently in Sinus rhythm.      #CAD- Hx of MI s/p AICD vs PPI- Per pharmacy pt has not picked up Plavix since November. Confirm with opt cardiologist     # LE Edema   - LE edema with chronic venous stasis.   - WIll r/o DVT. f/u Duplex    #RESPIRATORY   - pt with R loculated plural effusion noted to have increased work of breathing. Likely 2/2 fluid overload, low suspicion for pneumonia   - Starting on High flow NC @40% given increased work of breathing.   - Monitor resp status   - WIll discuss thoracocentesis with Pulmonary team     #RENAL   #ARF-  -Unknown baseline Cr presenting with Cr 3.93 with metabolic derangements.   - Likely 2/2 Sepsis, low intravascular volume and CHF  -Check UA, Urine Lytes. Trend BUN and Cr.  - Likely 2/2 severe sepsis  - s/p 4L fluids. f/u repeat BMP   - f/u CT ABd pelvis to r/o obstruction     #Hyperkalemia   - PT with elevated K to 5.7, no EKG changes   -Trend K   - Can given Kayexalate if persists     #Metabolic acidosis   - 2/2 Lactate, starvation ketoacidosis and uremia,   - s/p resuscitation Lactate trending down. Trend to lactate <2     #ID   - Severe sepsis 2/2 cellulitis. Less likely Cholecystitis.    - Given recent hospitalization. Will continue with Vanc and Zosyn (renally dosed)  - R lung Effusion likely from overload, low suspicion for infection     #GI   - pt wit Elevated Bilrirubin and Alk phos. Abd exam benign. WIll r/o cholecystitis.   - Ct abd/ pelvis     #HEME  - elevated INR. Unclear etiology. Pt on coumadin but unclear adherence Vit K def also in differential.  - f/u repeat coags     #ENDOCRINE   - Elevated Glucose above 300. Anion gap elevated however likely 2/2 Uremia and Lactate. Ketones in urine likely 2/2 Starvation.   - Will start on Insulin drip at 1U/hour   - Monitor blood glucose   -- f/u A1C    F: No IVF. s/p 4L NS    E: Pt Hyperkalemic 2/2 ARF   N: Renal Diet     Drips: Injsulin     Full code   MICU

## 2018-03-10 NOTE — CONSULT NOTE ADULT - ATTENDING COMMENTS
Patient with severe BL LE cellulitis.  No CT evidence of collection, air or nec fasc.  Recommend continued IV antibiotics and medical optimization.  No need for urgent vascular surgery intervention at this time.

## 2018-03-10 NOTE — H&P ADULT - PMH
Chronic systolic heart failure    Essential hypertension, benign    Gout    Myocardial infarction    Pacemaker    Type 2 diabetes mellitus with diabetic peripheral angiopathy without gangrene, with long-term curren

## 2018-03-10 NOTE — ED PROVIDER NOTE - PROGRESS NOTE DETAILS
Labs reviewed w/ signficant abnormalities. Pt with leukocytosis, arf, liver failure. Labs reviewed w/ signficant abnormalities. Pt with leukocytosis, lactic acidosis, arf with elev k to 6, mildly elev lft's. CXR showing ? loculated effusion to r lung, no focal infiltrate/ chf. ICU consult requested for evaluation. Repeat lactate showing mild increase despite ivf boluses, ? 2/2 elev liver enzymes. Repeat cmp showing slight improvement of k, cr, ag.

## 2018-03-10 NOTE — CONSULT NOTE ADULT - PROBLEM SELECTOR RECOMMENDATION 3
2/2 acute renal failure  -EKG without peak T waves or QRS widening.  -IVF  -insulin  -trend lytes. May consider kayexelate if uptrending or failure to resolve.

## 2018-03-10 NOTE — ED ADULT TRIAGE NOTE - CHIEF COMPLAINT QUOTE
Pt brought in by EMS for fever & lethargy.  As per EMS, visiting RN was @ patients home changing his wounds, pt has bilateral cellulitis on his legs.  States RN called because patient had a fever & appeared lethargic.  Pt awake & alert.

## 2018-03-10 NOTE — CONSULT NOTE ADULT - PROBLEM SELECTOR RECOMMENDATION 2
BNP 62k, with pitting edema of BLE, R sided pleural effusion and ascites.  -Although total body overloaded, currently appears intravascularly deplete 2/2 severe sepsis. Continue with IV fluids as above.  -No ACEi/ARB 2/2 hypotension and acute renal failure.  -No beta blocker in the setting of severe sepsis, hypotension and acute CHF exacerbation. Reported EF 10-15% 2/2 ischemic cardiomyopathy (reports history of MI years ago). BNP 62k, with pitting edema of BLE, R sided pleural effusion and ascites. ?Afib previously on coumadin, not in Afib at this time.  -check echocardiogram.  -Although total body overloaded, currently appears intravascularly deplete 2/2 severe sepsis. Continue with IV fluids as above.  -No ACEi/ARB 2/2 hypotension and acute renal failure.  -No beta blocker in the setting of severe sepsis, hypotension and acute CHF exacerbation.  -Consider anticoagulation pending coagulation studies.  -

## 2018-03-10 NOTE — CONSULT NOTE ADULT - SUBJECTIVE AND OBJECTIVE BOX
This is a 64 yo M poor historian, with history of ischemic cardiomyopathy, EF: 10-15% 2/2 MI, s/p AICD vs PPM, Afib, Hypertension, Diabetes Mellitus Type 2, and gout, who presents with a chief complaint of generalized weakness. Patient also noticed bilateral swellings and pain over lower legs that were worsening since 1 month ago. He was admitted at [facility] however unsure what was have done. He complained of difficulty in walking due to pain, however sensory intact. Noticed some pus coming out from few small ulcers on both legs (shin). Reported fever on and off over the past 1 month but no fever upon admission.  In ER, he was afebrile (37.1), tachycardia to 139. WBC was 32, lactate 6, GILMER on CKD? Creat: 3.9  CT and x-ray of bilateral LL : No gas, no collections  Physical exam:   General exam: sitting comfortably on a bed, no sign of distress  CVS: Normal S1S2, with S3 gallops   RS: On nasal cannula, not tachypnic and 97% O2 sat  Bilateral lower limbs examination   Inspection: marked swelling bilaterally, multiple small superficial ulcers over the shin of both legs, small amount of pus. Skin was mild-moderately erythematous, dry. No gangrene.  Palpation: Mild tenderness bilaterally up to tibial tuberosity level. Indurated, pitting pedal edema. Palpable fem/pop bilaterally, unable to appreciate PT bilaterally. Biphasic to monophasic DP.  No crepitus felt. No bullae      Vital Signs Last 24 Hrs  T(C): 37.3 (10 Mar 2018 17:41), Max: 38.9 (10 Mar 2018 13:20)  T(F): 99.1 (10 Mar 2018 17:41), Max: 102 (10 Mar 2018 13:20)  HR: 101 (10 Mar 2018 16:32) (101 - 139)  BP: 84/68 (10 Mar 2018 16:32) (74/58 - 103/70)  BP(mean): 74 (10 Mar 2018 16:32) (63 - 74)  RR: 18 (10 Mar 2018 16:32) (16 - 18)  SpO2: 99% (10 Mar 2018 16:32) (97% - 99%)  I&O's Detail    10 Mar 2018 06:01  -  10 Mar 2018 17:47  --------------------------------------------------------  IN:  Total IN: 0 mL    OUT:    Voided: 150 mL  Total OUT: 150 mL    Total NET: -150 mL        LABS:                        11.5   32.8  )-----------( 219      ( 10 Mar 2018 13:48 )             35.3     03-10    135  |  99  |  79<H>  ----------------------------<  317<H>  5.7<H>   |  15<L>  |  3.90<H>    Ca    8.4      10 Mar 2018 15:21    TPro  6.7  /  Alb  2.3<L>  /  TBili  3.4<H>  /  DBili  x   /  AST  17  /  ALT  13  /  AlkPhos  154<H>  03-10      Urinalysis Basic - ( 10 Mar 2018 16:31 )    Color: Yellow / Appearance: SL Cloudy / SG: >=1.030 / pH: x  Gluc: x / Ketone: Trace mg/dL  / Bili: Moderate / Urobili: 2.0 E.U./dL   Blood: x / Protein: 30 mg/dL / Nitrite: NEGATIVE   Leuk Esterase: NEGATIVE / RBC: Many /HPF / WBC < 5 /HPF   Sq Epi: x / Non Sq Epi: 5-10 /HPF / Bacteria: Present /HPF        RADIOLOGY & ADDITIONAL STUDIES: Pending final read.

## 2018-03-10 NOTE — CONSULT NOTE ADULT - PROBLEM SELECTOR RECOMMENDATION 8
Duplex US BLE Duplex US BLE    Accept to MICU for management.    Case discussed with ICU Fellow and Dr Dexter.

## 2018-03-10 NOTE — ED PROVIDER NOTE - PHYSICAL EXAMINATION
VITAL SIGNS: I have reviewed nursing notes and confirm.  CONSTITUTIONAL: Well-developed; well-nourished; in no acute distress, weak appearing.  SKIN:  warm and dry, no acute rash.   HEAD:  normocephalic, atraumatic.  EYES: PERRL, EOM intact; conjunctiva and sclera clear.  ENT: No nasal discharge; airway clear. Dry mucosa.  NECK: Supple; non tender.  CARD: S1, S2 normal; no murmurs, gallops, or rubs. Tachycardic rate and reg rhythm.   RESP:  Clear to auscultation b/l, no wheezes, rales or rhonchi.  ABD: Normal bowel sounds; soft; non-distended; non-tender; no guarding/ rebound.  EXT: LLE: + erythema, swelling, warmth to lle, with scar to posterior lower leg (2/2 prior achilles tendon surgery). + palpable dp/pt pulses.   NEURO: Alert, weak appearing, moves all ext.

## 2018-03-10 NOTE — H&P ADULT - NSHPLABSRESULTS_GEN_ALL_CORE
.  LABS:                         11.5   32.8  )-----------( 219      ( 10 Mar 2018 13:48 )             35.3     03-10    135  |  99  |  79<H>  ----------------------------<  317<H>  5.7<H>   |  15<L>  |  3.90<H>    Ca    8.4      10 Mar 2018 15:21    TPro  6.7  /  Alb  2.3<L>  /  TBili  3.4<H>  /  DBili  2.0<H>  /  AST  17  /  ALT  13  /  AlkPhos  154<H>  03-10      Urinalysis Basic - ( 10 Mar 2018 16:31 )    Color: Yellow / Appearance: SL Cloudy / SG: >=1.030 / pH: x  Gluc: x / Ketone: Trace mg/dL  / Bili: Moderate / Urobili: 2.0 E.U./dL   Blood: x / Protein: 30 mg/dL / Nitrite: NEGATIVE   Leuk Esterase: NEGATIVE / RBC: Many /HPF / WBC < 5 /HPF   Sq Epi: x / Non Sq Epi: 5-10 /HPF / Bacteria: Present /HPF      CARDIAC MARKERS ( 10 Mar 2018 15:21 )  x     / 0.08 ng/mL / 283 U/L / x     / 1.7 ng/mL      Serum Pro-Brain Natriuretic Peptide: 70035 pg/mL (03-10 @ 15:21)    Lactate, Blood: 6.7 mmoL/L (03-10 @ 15:22)  Lactate, Blood: 6.6 mmoL/L (03-10 @ 13:47)      RADIOLOGY, EKG & ADDITIONAL TESTS: Reviewed.

## 2018-03-10 NOTE — CONSULT NOTE ADULT - ASSESSMENT
63y Male poor historian with a Hx of HFrEF 10-15% 2/2 ischemic cardiomyopathy with reported MI in the distant past, s/p AICD vs PPM, ?Afib, Hypertension, Diabetes Mellitus Type 2, and gout who presented with severe sepsis 2/2 LLE cellulitis vs. less likely abdominal source, acute renal failure, acute on chronic systolic CHF exacerbation, hyperglycemia, starvation ketosis, and hyperbilirubinemia.

## 2018-03-10 NOTE — ED PROVIDER NOTE - OBJECTIVE STATEMENT
Pt is a 62yo m, h/o dm, chf, pm, biba for fever. Pt is very poor historian. Was dx'd w/ b/l cellulitis, unclear if taking abx for infection this past wk. Was seen by visiting rn today and found to be febrile. Pt c/o pain to lle. Otherwise denies any uri sx's, cp, sob, abd pain. + vomiting and watery diarrhea. No dysuria. Very limited hx obtained.

## 2018-03-10 NOTE — CONSULT NOTE ADULT - PROBLEM SELECTOR PROBLEM 8
R/O Deep vein thrombosis (DVT) of lower extremity, unspecified chronicity, unspecified laterality, unspecified vein

## 2018-03-10 NOTE — CONSULT NOTE ADULT - ATTENDING COMMENTS
Continue hydration with frequent lung exams. Vascular consult called. CT scan results noted. continue antibiotics and repeat VBG and lactate post 2.5 liters IVF. Maintain MAP >60. Adjust meds with ATN and decreased CrCl. Insulin gtt for glycemic control.

## 2018-03-10 NOTE — CONSULT NOTE ADULT - SUBJECTIVE AND OBJECTIVE BOX
62 yo M poor historian with self-reported history of HFrEF 10-15% 2/2 ischemic cardiomyopathy with reported MI in the distant past, s/p AICD vs PPM, ?Afib, Hypertension, Diabetes Mellitus Type 2, and gout, who presents with a chief complaint of generalized weakness. Patient states that he was at [facility] a month ago for gout. He reports that he was there for a month but is unclear of his course at that facility. He currently complains of generalized weakness which has progressively worsened over several days. CHIEF COMPLAINT:  Patient is a 63y old  Male who presents with a chief complaint of fever.    ED Provider Note reviewed in detail.    HPI:  HUNTER BRIZUELA is a 63y Male poor historian with a Hx of HFrEF 10-15% 2/2 ischemic cardiomyopathy with reported MI in the distant past, s/p AICD vs PPM, ?Afib, Hypertension, Diabetes Mellitus Type 2, and gout, who presents with a chief complaint of fever and generalized weakness for 1-2 days. [facility] He reports that he was there for a month but is unclear of his course at that facility. He currently complains of generalized weakness which has progressively worsened over several days. He also reports subjective fevers at home for 1-2 days. He complains of pain in the L foot and leg which is dull/achy, 5-6/10 in severity and non-radiating. He has not seen a doctor for this leg pain and has not taken any antibiotics. He has a HHA several days per week and states that the aid has not come for the past week because the service thought he was in the hospital. As such he has not eaten in a week and has not taken his medications in a week. He also reports a history of chronic renal problems (unknown baseline Cr) and was scheduled for Nephrology appointment on Monday.    PAST MEDICAL & SURGICAL HISTORY:  Type 2 diabetes mellitus with diabetic peripheral angiopathy without gangrene, with long-term curren  Essential hypertension, benign  Gout  Pacemaker  Chronic systolic heart failure  Myocardial infarction  Achilles tendon surgery    FAMILY HISTORY:  non-contributory    SOCIAL HISTORY:  denies tobacco use ever  denies alcohol use  denies drug use  Lives in an apartment on the [facility]. Has a HHA 2-3 days per week.    REVIEW OF SYSTEMS:  10 system review of systems negative except as per HPI    Vital Signs Last 24 Hrs  T(C): 37.2 (10 Mar 2018 18:18), Max: 38.9 (10 Mar 2018 13:20)  T(F): 98.9 (10 Mar 2018 18:18), Max: 102 (10 Mar 2018 13:20)  HR: 104 (10 Mar 2018 18:18) (101 - 139)  BP: 87/71 (10 Mar 2018 18:18) (74/58 - 103/70)  BP(mean): 77 (10 Mar 2018 18:18) (63 - 77)  RR: 20 (10 Mar 2018 18:18) (16 - 20)  SpO2: 98% (10 Mar 2018 18:18) (97% - 99%)    Weight (kg): 80 (03-10 @ 14:03)    CAPILLARY BLOOD GLUCOSE    PHYSICAL EXAM:  Gen:       frail appearing, no acute distress  Skin:       cool, dry, no rash, lower extremity skin findings as below.  HEENT:  moist mucous membranes, oropharynx clear, nares clear, no septal deviation  Neck:     supple, no jugular venous distention, no thyromegaly or goiter  Chest:   regular rate and rhythm, S1/S2 present, no murmur/gallop/rub, PPM in L chest  Pulm:     clear to auscultation bilaterally, no wheezes/crackles  Abd:       soft/nontender/nondistended, normoactive bowel sounds  Lymph:  no anterior/posterior/supraclavicular nodes appreciated  Vasc:      warm and well perfused, 2+ dorsalis pedis and radial pulses, 3+ pitting edema BLE  Ext:        tenderness, erythema and warmth of the LLE as compared to the R, BLE pitting edema. There are   Neuro:   awake, alert and oriented x3, Cranial Nerves II-XII grossly intact, no focal deficits      Allergies  No Known Allergies      LABS:                        11.5   32.8  )-----------( 219      ( 10 Mar 2018 13:48 )             35.3    Mean Cell Volume: 88.9 fL (03-10 @ 13:48)    03-10    135  |  99  |  79<H>  ----------------------------<  317<H>  5.7<H>   |  15<L>  |  3.90<H>    Ca    8.4      10 Mar 2018 15:21    TPro  6.7  /  Alb  2.3<L>  /  TBili  3.4<H>  /  DBili  2.0<H>  /  AST  17  /  ALT  13  /  AlkPhos  154<H>  03-10      Urinalysis Basic - ( 10 Mar 2018 16:31 )    Color: Yellow / Appearance: SL Cloudy / SG: >=1.030 / pH: x  Gluc: x / Ketone: Trace mg/dL  / Bili: Moderate / Urobili: 2.0 E.U./dL   Blood: x / Protein: 30 mg/dL / Nitrite: NEGATIVE   Leuk Esterase: NEGATIVE / RBC: Many /HPF / WBC < 5 /HPF   Sq Epi: x / Non Sq Epi: 5-10 /HPF / Bacteria: Present /HPF        MICROBIOLOGY: REVIEWED  pending    CARDIOLOGY: REVIEWED  EKG (my read): poor quality EKG, q waves in lateral leads,     RADIOLOGY: REVIEWED  Xray chest (my read): mild pulmonary vascular congestion, R effusion vs infiltrate CHIEF COMPLAINT:  Patient is a 63y old  Male who presents with a chief complaint of fever.    ED Provider Note reviewed in detail.    HPI:  HUNTER BRIZUELA is a 63y Male poor historian with a Hx of HFrEF 10-15% 2/2 ischemic cardiomyopathy with reported MI in the distant past, s/p AICD vs PPM, ?Afib, Hypertension, Diabetes Mellitus Type 2, and gout, who presents with a chief complaint of fever and generalized weakness for 1-2 days. Patient states that he was at [facility] a month ago for gout. He reports that he was there for a month but is unclear of his course at that facility. He currently complains of generalized weakness which has progressively worsened over several days. He also reports subjective fevers at home for 1-2 days. He complains of pain in the L foot and leg which is dull/achy, 5-6/10 in severity and non-radiating. He has not seen a doctor for this leg pain and has not taken any antibiotics. He has a HHA several days per week and states that the aid has not come for the past week because the service thought he was in the hospital. As such he has not eaten in a week and has not taken his medications in a week. He also reports a history of chronic renal problems (unknown baseline Cr) and was scheduled for Nephrology appointment on Monday.    PAST MEDICAL & SURGICAL HISTORY:  Type 2 diabetes mellitus with diabetic peripheral angiopathy without gangrene, with long-term curren  Essential hypertension, benign  Gout  Pacemaker  Chronic systolic heart failure  Myocardial infarction  Achilles tendon surgery    FAMILY HISTORY:  non-contributory    SOCIAL HISTORY:  denies tobacco use ever  denies alcohol use  denies drug use  Lives in an apartment on the [facility]. Has a HHA 2-3 days per week.    REVIEW OF SYSTEMS:  10 system review of systems negative except as per HPI    Home Medications:  Januvia 25 mg oral tablet: 1 tab(s) orally once a day (10 Mar 2018 15:56)  Plavix 75 mg oral tablet: 1 tab(s) orally once a day (10 Mar 2018 15:57)  Toprol- mg oral tablet, extended release: 1 tab(s) orally once a day (10 Mar 2018 15:56)  torsemide 20 mg oral tablet: 1 tab(s) orally once a day (10 Mar 2018 15:56)  warfarin 5 mg oral tablet: 1 tab(s) orally once a day (10 Mar 2018 15:57)    Vital Signs Last 24 Hrs  T(C): 37.2 (10 Mar 2018 18:18), Max: 38.9 (10 Mar 2018 13:20)  T(F): 98.9 (10 Mar 2018 18:18), Max: 102 (10 Mar 2018 13:20)  HR: 104 (10 Mar 2018 18:18) (101 - 139)  BP: 87/71 (10 Mar 2018 18:18) (74/58 - 103/70)  BP(mean): 77 (10 Mar 2018 18:18) (63 - 77)  RR: 20 (10 Mar 2018 18:18) (16 - 20)  SpO2: 98% (10 Mar 2018 18:18) (97% - 99%)    Weight (kg): 80 (03-10 @ 14:03)    CAPILLARY BLOOD GLUCOSE    PHYSICAL EXAM:  Gen:       frail appearing, no acute distress  Skin:       cool, dry, no rash, lower extremity skin findings as below.  HEENT:  moist mucous membranes, oropharynx clear, nares clear, no septal deviation  Neck:     supple, no jugular venous distention, no thyromegaly or goiter  Chest:   regular rate and rhythm, S1/S2 present, no murmur/gallop/rub, PPM in L chest  Pulm:     clear to auscultation bilaterally, no wheezes/crackles  Abd:       soft/nontender/nondistended, normoactive bowel sounds  Lymph:  no anterior/posterior/supraclavicular nodes appreciated  Vasc:      warm and well perfused, 2+ dorsalis pedis and radial pulses, 3+ pitting edema BLE  Ext:        tenderness, erythema and warmth of the LLE as compared to the R, BLE pitting edema. There are   Neuro:   awake, alert and oriented x3, Cranial Nerves II-XII grossly intact, no focal deficits      Allergies  No Known Allergies      LABS:                        11.5   32.8  )-----------( 219      ( 10 Mar 2018 13:48 )             35.3    Mean Cell Volume: 88.9 fL (03-10 @ 13:48)    03-10    135  |  99  |  79<H>  ----------------------------<  317<H>  5.7<H>   |  15<L>  |  3.90<H>    Ca    8.4      10 Mar 2018 15:21    TPro  6.7  /  Alb  2.3<L>  /  TBili  3.4<H>  /  DBili  2.0<H>  /  AST  17  /  ALT  13  /  AlkPhos  154<H>  03-10      Urinalysis Basic - ( 10 Mar 2018 16:31 )    Color: Yellow / Appearance: SL Cloudy / SG: >=1.030 / pH: x  Gluc: x / Ketone: Trace mg/dL  / Bili: Moderate / Urobili: 2.0 E.U./dL   Blood: x / Protein: 30 mg/dL / Nitrite: NEGATIVE   Leuk Esterase: NEGATIVE / RBC: Many /HPF / WBC < 5 /HPF   Sq Epi: x / Non Sq Epi: 5-10 /HPF / Bacteria: Present /HPF        MICROBIOLOGY: REVIEWED  pending    CARDIOLOGY: REVIEWED  EKG (my read): poor quality EKG, q waves in lateral leads,     RADIOLOGY: REVIEWED  Xray chest (my read): mild pulmonary vascular congestion, R effusion vs infiltrate

## 2018-03-10 NOTE — H&P ADULT - NSHPPHYSICALEXAM_GEN_ALL_CORE
.  VITAL SIGNS:  T(C): 37.2 (03-10-18 @ 18:18), Max: 38.9 (03-10-18 @ 13:20)  T(F): 98.9 (03-10-18 @ 18:18), Max: 102 (03-10-18 @ 13:20)  HR: 104 (03-10-18 @ 18:18) (101 - 139)  BP: 87/71 (03-10-18 @ 18:18) (74/58 - 103/70)  BP(mean): 77 (03-10-18 @ 18:18) (63 - 77)  RR: 20 (03-10-18 @ 18:18) (16 - 20)  SpO2: 98% (03-10-18 @ 18:18) (97% - 99%)  Wt(kg): --    PHYSICAL EXAM:    Constitutional: Responds slowly but appropriately to questions. AOx3   Head: NC/AT  Eyes: PERRL, EOMI, clear conjunctiva  ENT: no nasal discharge; uvula midline, no oropharyngeal erythema or exudates; MMM  Neck: supple; no JVD or thyromegaly  Respiratory: Increased work of breathing. No accessory muscle use. RLL decreased lung sounds.  Cardiac: +S1/S2; tachycardic. no M/R/G; PMI non-displaced  Gastrointestinal: soft, NT/ND; no rebound or guarding; +BSx4  Genitourinary: normal external genitalia. Hemphill in place.   Back: spine midline, no bony tenderness or step-offs; no CVAT B/L  Extremities: Chronic venous stasis skin changes b/l with several sites of skin break in pre tibial region b/l. No exudate noted. 1+ DP Pulses b/l   Musculoskeletal: NROM x4; no joint swelling, tenderness or erythema  Lymphatic: no submandibular or cervical LAD  Neurologic: AAOx3; CNII-XII grossly intact; no focal deficits  Psychiatric: Appears fatigued, responds slowly .  VITAL SIGNS:  T(C): 37.2 (03-10-18 @ 18:18), Max: 38.9 (03-10-18 @ 13:20)  T(F): 98.9 (03-10-18 @ 18:18), Max: 102 (03-10-18 @ 13:20)  HR: 104 (03-10-18 @ 18:18) (101 - 139)  BP: 87/71 (03-10-18 @ 18:18) (74/58 - 103/70)  BP(mean): 77 (03-10-18 @ 18:18) (63 - 77)  RR: 20 (03-10-18 @ 18:18) (16 - 20)  SpO2: 98% (03-10-18 @ 18:18) (97% - 99%)  Wt(kg): --    PHYSICAL EXAM:    Constitutional: Responds slowly but appropriately to questions. AOx3   Head: NC/AT  Eyes: PERRL, EOMI, clear conjunctiva  ENT: no nasal discharge; uvula midline, no oropharyngeal erythema or exudates; MMM  Neck: supple; no JVD or thyromegaly  Respiratory: Increased work of breathing. No accessory muscle use. RLL decreased lung sounds.  Cardiac: +S1/S2; tachycardic. no M/R/G; PMI non-displaced  Gastrointestinal: soft, NT/ND; no rebound or guarding; +BSx4  Genitourinary: normal external genitalia. Hemphill in place.   Back: spine midline, no bony tenderness or step-offs; no CVAT B/L  Extremities: Chronic venous stasis skin changes b/l with several sites of skin break in pre tibial region b/l. No exudate noted. 2+ edema. 1+ DP Pulses b/l   Musculoskeletal: NROM x4; no joint swelling, tenderness or erythema  Lymphatic: no submandibular or cervical LAD  Neurologic: AAOx3; CNII-XII grossly intact; no focal deficits  Psychiatric: Appears fatigued, responds slowly

## 2018-03-10 NOTE — CONSULT NOTE ADULT - PROBLEM SELECTOR RECOMMENDATION 9
Presented with severe sepsis (fever, tachycardia, leukocytosis) and lactic acid 6.6 with presumed source LLE cellulitis vs. less likely cholecystitis.  -Check CT Abd/Pelvis. F/u Xray and CT LLE.  -Trend CBC. Check ESR/CRP.  -F/u blood cultures.  -C/w Vancomycin/Zosyn given recent hospitalization.  -s/p 3L without improvement in lactic acid. Pressures remain low (although baseline systolic reportedly in the 90s), but tachycardia is improving. Will continue with fluid resuscitation in small 250cc aliquots given CHF. Low albumin 2.8 on admission, likely concentrated so suspect less than 2.5. Will consider 250cc 5% albumin infusion if continues to need fluid. Frequent lung exams while receiving fluids given low EF. May require NIPPV or intubation and mechanical ventilation. Trend lactic acid q4h til resolution.  -Vascular surgery evaluation given high suspicion for peripheral vascular disease which may reduce perfusion and decrease delivery of antibiotics to the infected leg.

## 2018-03-11 DIAGNOSIS — N18.9 CHRONIC KIDNEY DISEASE, UNSPECIFIED: ICD-10-CM

## 2018-03-11 DIAGNOSIS — N25.0 RENAL OSTEODYSTROPHY: ICD-10-CM

## 2018-03-11 DIAGNOSIS — N17.9 ACUTE KIDNEY FAILURE, UNSPECIFIED: ICD-10-CM

## 2018-03-11 DIAGNOSIS — E87.2 ACIDOSIS: ICD-10-CM

## 2018-03-11 LAB
ALBUMIN SERPL ELPH-MCNC: 2.1 G/DL — LOW (ref 3.3–5)
ALP SERPL-CCNC: 135 U/L — HIGH (ref 40–120)
ALT FLD-CCNC: 14 U/L — SIGNIFICANT CHANGE UP (ref 10–45)
ANION GAP SERPL CALC-SCNC: 16 MMOL/L — SIGNIFICANT CHANGE UP (ref 5–17)
ANION GAP SERPL CALC-SCNC: 16 MMOL/L — SIGNIFICANT CHANGE UP (ref 5–17)
ANION GAP SERPL CALC-SCNC: 17 MMOL/L — SIGNIFICANT CHANGE UP (ref 5–17)
APTT BLD: 37.9 SEC — HIGH (ref 27.5–37.4)
AST SERPL-CCNC: 22 U/L — SIGNIFICANT CHANGE UP (ref 10–40)
BILIRUB SERPL-MCNC: 2.4 MG/DL — HIGH (ref 0.2–1.2)
BUN SERPL-MCNC: 82 MG/DL — HIGH (ref 7–23)
BUN SERPL-MCNC: 85 MG/DL — HIGH (ref 7–23)
BUN SERPL-MCNC: 87 MG/DL — HIGH (ref 7–23)
CALCIUM SERPL-MCNC: 8.1 MG/DL — LOW (ref 8.4–10.5)
CALCIUM SERPL-MCNC: 8.3 MG/DL — LOW (ref 8.4–10.5)
CALCIUM SERPL-MCNC: 9.2 MG/DL — SIGNIFICANT CHANGE UP (ref 8.4–10.5)
CHLORIDE SERPL-SCNC: 100 MMOL/L — SIGNIFICANT CHANGE UP (ref 96–108)
CHLORIDE SERPL-SCNC: 101 MMOL/L — SIGNIFICANT CHANGE UP (ref 96–108)
CHLORIDE SERPL-SCNC: 102 MMOL/L — SIGNIFICANT CHANGE UP (ref 96–108)
CO2 SERPL-SCNC: 16 MMOL/L — LOW (ref 22–31)
CO2 SERPL-SCNC: 19 MMOL/L — LOW (ref 22–31)
CO2 SERPL-SCNC: 22 MMOL/L — SIGNIFICANT CHANGE UP (ref 22–31)
CREAT SERPL-MCNC: 3.93 MG/DL — HIGH (ref 0.5–1.3)
CREAT SERPL-MCNC: 3.95 MG/DL — HIGH (ref 0.5–1.3)
CREAT SERPL-MCNC: 4.02 MG/DL — HIGH (ref 0.5–1.3)
CULTURE RESULTS: NO GROWTH — SIGNIFICANT CHANGE UP
GAS PNL BLDA: SIGNIFICANT CHANGE UP
GAS PNL BLDV: SIGNIFICANT CHANGE UP
GAS PNL BLDV: SIGNIFICANT CHANGE UP
GLUCOSE BLDC GLUCOMTR-MCNC: 124 MG/DL — HIGH (ref 70–99)
GLUCOSE BLDC GLUCOMTR-MCNC: 133 MG/DL — HIGH (ref 70–99)
GLUCOSE BLDC GLUCOMTR-MCNC: 144 MG/DL — HIGH (ref 70–99)
GLUCOSE BLDC GLUCOMTR-MCNC: 155 MG/DL — HIGH (ref 70–99)
GLUCOSE BLDC GLUCOMTR-MCNC: 196 MG/DL — HIGH (ref 70–99)
GLUCOSE BLDC GLUCOMTR-MCNC: 65 MG/DL — LOW (ref 70–99)
GLUCOSE BLDC GLUCOMTR-MCNC: 86 MG/DL — SIGNIFICANT CHANGE UP (ref 70–99)
GLUCOSE BLDC GLUCOMTR-MCNC: 93 MG/DL — SIGNIFICANT CHANGE UP (ref 70–99)
GLUCOSE SERPL-MCNC: 115 MG/DL — HIGH (ref 70–99)
GLUCOSE SERPL-MCNC: 148 MG/DL — HIGH (ref 70–99)
GLUCOSE SERPL-MCNC: 261 MG/DL — HIGH (ref 70–99)
HBA1C BLD-MCNC: 8.6 % — HIGH (ref 4–5.6)
HCT VFR BLD CALC: 31.4 % — LOW (ref 39–50)
HCT VFR BLD CALC: 34.7 % — LOW (ref 39–50)
HGB BLD-MCNC: 11.3 G/DL — LOW (ref 13–17)
HGB BLD-MCNC: 9.7 G/DL — LOW (ref 13–17)
INR BLD: 2.31 — HIGH (ref 0.88–1.16)
LACTATE SERPL-SCNC: 2.4 MMOL/L — HIGH (ref 0.5–2)
LACTATE SERPL-SCNC: 4.1 MMOL/L — CRITICAL HIGH (ref 0.5–2)
LYMPHOCYTES # BLD AUTO: 4 % — LOW (ref 13–44)
MAGNESIUM SERPL-MCNC: 2 MG/DL — SIGNIFICANT CHANGE UP (ref 1.6–2.6)
MAGNESIUM SERPL-MCNC: 2 MG/DL — SIGNIFICANT CHANGE UP (ref 1.6–2.6)
MCHC RBC-ENTMCNC: 27.8 PG — SIGNIFICANT CHANGE UP (ref 27–34)
MCHC RBC-ENTMCNC: 29 PG — SIGNIFICANT CHANGE UP (ref 27–34)
MCHC RBC-ENTMCNC: 30.9 G/DL — LOW (ref 32–36)
MCHC RBC-ENTMCNC: 32.6 G/DL — SIGNIFICANT CHANGE UP (ref 32–36)
MCV RBC AUTO: 89.2 FL — SIGNIFICANT CHANGE UP (ref 80–100)
MCV RBC AUTO: 90 FL — SIGNIFICANT CHANGE UP (ref 80–100)
MONOCYTES NFR BLD AUTO: 2 % — SIGNIFICANT CHANGE UP (ref 2–14)
NEUTROPHILS NFR BLD AUTO: 62 % — SIGNIFICANT CHANGE UP (ref 43–77)
PHOSPHATE SERPL-MCNC: 3.5 MG/DL — SIGNIFICANT CHANGE UP (ref 2.5–4.5)
PHOSPHATE SERPL-MCNC: 4.1 MG/DL — SIGNIFICANT CHANGE UP (ref 2.5–4.5)
PLATELET # BLD AUTO: 180 K/UL — SIGNIFICANT CHANGE UP (ref 150–400)
PLATELET # BLD AUTO: 210 K/UL — SIGNIFICANT CHANGE UP (ref 150–400)
POTASSIUM SERPL-MCNC: 4.8 MMOL/L — SIGNIFICANT CHANGE UP (ref 3.5–5.3)
POTASSIUM SERPL-MCNC: 5.2 MMOL/L — SIGNIFICANT CHANGE UP (ref 3.5–5.3)
POTASSIUM SERPL-MCNC: 5.5 MMOL/L — HIGH (ref 3.5–5.3)
POTASSIUM SERPL-SCNC: 4.8 MMOL/L — SIGNIFICANT CHANGE UP (ref 3.5–5.3)
POTASSIUM SERPL-SCNC: 5.2 MMOL/L — SIGNIFICANT CHANGE UP (ref 3.5–5.3)
POTASSIUM SERPL-SCNC: 5.5 MMOL/L — HIGH (ref 3.5–5.3)
PROT SERPL-MCNC: 6.7 G/DL — SIGNIFICANT CHANGE UP (ref 6–8.3)
PROTHROM AB SERPL-ACNC: 26.1 SEC — HIGH (ref 9.8–12.7)
RBC # BLD: 3.49 M/UL — LOW (ref 4.2–5.8)
RBC # BLD: 3.89 M/UL — LOW (ref 4.2–5.8)
RBC # FLD: 15.1 % — SIGNIFICANT CHANGE UP (ref 10.3–16.9)
RBC # FLD: 15.2 % — SIGNIFICANT CHANGE UP (ref 10.3–16.9)
SODIUM SERPL-SCNC: 135 MMOL/L — SIGNIFICANT CHANGE UP (ref 135–145)
SODIUM SERPL-SCNC: 135 MMOL/L — SIGNIFICANT CHANGE UP (ref 135–145)
SODIUM SERPL-SCNC: 139 MMOL/L — SIGNIFICANT CHANGE UP (ref 135–145)
SPECIMEN SOURCE: SIGNIFICANT CHANGE UP
T4 AB SER-ACNC: 3.59 UG/DL — SIGNIFICANT CHANGE UP (ref 3.17–11.72)
TSH SERPL-MCNC: 4.43 UIU/ML — SIGNIFICANT CHANGE UP (ref 0.35–4.94)
VANCOMYCIN TROUGH SERPL-MCNC: 8 UG/ML — LOW (ref 10–20)
WBC # BLD: 30.1 K/UL — HIGH (ref 3.8–10.5)
WBC # BLD: 36.2 K/UL — HIGH (ref 3.8–10.5)
WBC # FLD AUTO: 30.1 K/UL — HIGH (ref 3.8–10.5)
WBC # FLD AUTO: 36.2 K/UL — HIGH (ref 3.8–10.5)

## 2018-03-11 PROCEDURE — 99291 CRITICAL CARE FIRST HOUR: CPT

## 2018-03-11 PROCEDURE — 71045 X-RAY EXAM CHEST 1 VIEW: CPT | Mod: 26

## 2018-03-11 RX ORDER — METOPROLOL TARTRATE 50 MG
5 TABLET ORAL ONCE
Qty: 0 | Refills: 0 | Status: DISCONTINUED | OUTPATIENT
Start: 2018-03-11 | End: 2018-03-11

## 2018-03-11 RX ORDER — ACETAMINOPHEN 500 MG
650 TABLET ORAL EVERY 6 HOURS
Qty: 0 | Refills: 0 | Status: DISCONTINUED | OUTPATIENT
Start: 2018-03-11 | End: 2018-03-26

## 2018-03-11 RX ORDER — DEXTROSE 50 % IN WATER 50 %
25 SYRINGE (ML) INTRAVENOUS ONCE
Qty: 0 | Refills: 0 | Status: COMPLETED | OUTPATIENT
Start: 2018-03-11 | End: 2018-03-11

## 2018-03-11 RX ORDER — DEXTROSE 50 % IN WATER 50 %
12.5 SYRINGE (ML) INTRAVENOUS ONCE
Qty: 0 | Refills: 0 | Status: DISCONTINUED | OUTPATIENT
Start: 2018-03-11 | End: 2018-03-11

## 2018-03-11 RX ORDER — DOBUTAMINE HCL 250MG/20ML
2.5 VIAL (ML) INTRAVENOUS
Qty: 500 | Refills: 0 | Status: DISCONTINUED | OUTPATIENT
Start: 2018-03-11 | End: 2018-03-11

## 2018-03-11 RX ORDER — SODIUM CHLORIDE 9 MG/ML
500 INJECTION INTRAMUSCULAR; INTRAVENOUS; SUBCUTANEOUS ONCE
Qty: 0 | Refills: 0 | Status: COMPLETED | OUTPATIENT
Start: 2018-03-11 | End: 2018-03-11

## 2018-03-11 RX ORDER — DEXTROSE 50 % IN WATER 50 %
25 SYRINGE (ML) INTRAVENOUS ONCE
Qty: 0 | Refills: 0 | Status: DISCONTINUED | OUTPATIENT
Start: 2018-03-11 | End: 2018-04-08

## 2018-03-11 RX ORDER — GLUCAGON INJECTION, SOLUTION 0.5 MG/.1ML
1 INJECTION, SOLUTION SUBCUTANEOUS ONCE
Qty: 0 | Refills: 0 | Status: DISCONTINUED | OUTPATIENT
Start: 2018-03-11 | End: 2018-04-08

## 2018-03-11 RX ORDER — NOREPINEPHRINE BITARTRATE/D5W 8 MG/250ML
0.01 PLASTIC BAG, INJECTION (ML) INTRAVENOUS
Qty: 8 | Refills: 0 | Status: DISCONTINUED | OUTPATIENT
Start: 2018-03-11 | End: 2018-03-12

## 2018-03-11 RX ORDER — DEXTROSE 50 % IN WATER 50 %
1 SYRINGE (ML) INTRAVENOUS ONCE
Qty: 0 | Refills: 0 | Status: DISCONTINUED | OUTPATIENT
Start: 2018-03-11 | End: 2018-04-08

## 2018-03-11 RX ORDER — VANCOMYCIN HCL 1 G
VIAL (EA) INTRAVENOUS
Qty: 0 | Refills: 0 | Status: DISCONTINUED | OUTPATIENT
Start: 2018-03-11 | End: 2018-03-12

## 2018-03-11 RX ORDER — MILRINONE LACTATE 1 MG/ML
0.38 INJECTION, SOLUTION INTRAVENOUS
Qty: 20 | Refills: 0 | Status: DISCONTINUED | OUTPATIENT
Start: 2018-03-11 | End: 2018-03-13

## 2018-03-11 RX ORDER — VANCOMYCIN HCL 1 G
1000 VIAL (EA) INTRAVENOUS EVERY 24 HOURS
Qty: 0 | Refills: 0 | Status: DISCONTINUED | OUTPATIENT
Start: 2018-03-12 | End: 2018-03-12

## 2018-03-11 RX ORDER — FOLIC ACID 0.8 MG
1 TABLET ORAL DAILY
Qty: 0 | Refills: 0 | Status: DISCONTINUED | OUTPATIENT
Start: 2018-03-11 | End: 2018-06-21

## 2018-03-11 RX ORDER — ALBUMIN HUMAN 25 %
250 VIAL (ML) INTRAVENOUS ONCE
Qty: 0 | Refills: 0 | Status: COMPLETED | OUTPATIENT
Start: 2018-03-11 | End: 2018-03-11

## 2018-03-11 RX ORDER — METOPROLOL TARTRATE 50 MG
5 TABLET ORAL ONCE
Qty: 0 | Refills: 0 | Status: COMPLETED | OUTPATIENT
Start: 2018-03-11 | End: 2018-03-11

## 2018-03-11 RX ORDER — SODIUM CHLORIDE 9 MG/ML
1000 INJECTION, SOLUTION INTRAVENOUS
Qty: 0 | Refills: 0 | Status: DISCONTINUED | OUTPATIENT
Start: 2018-03-11 | End: 2018-04-08

## 2018-03-11 RX ORDER — VANCOMYCIN HCL 1 G
1000 VIAL (EA) INTRAVENOUS ONCE
Qty: 0 | Refills: 0 | Status: COMPLETED | OUTPATIENT
Start: 2018-03-11 | End: 2018-03-11

## 2018-03-11 RX ORDER — INSULIN LISPRO 100/ML
VIAL (ML) SUBCUTANEOUS
Qty: 0 | Refills: 0 | Status: DISCONTINUED | OUTPATIENT
Start: 2018-03-11 | End: 2018-03-23

## 2018-03-11 RX ORDER — DEXTROSE 50 % IN WATER 50 %
12.5 SYRINGE (ML) INTRAVENOUS ONCE
Qty: 0 | Refills: 0 | Status: DISCONTINUED | OUTPATIENT
Start: 2018-03-11 | End: 2018-04-08

## 2018-03-11 RX ADMIN — PIPERACILLIN AND TAZOBACTAM 200 GRAM(S): 4; .5 INJECTION, POWDER, LYOPHILIZED, FOR SOLUTION INTRAVENOUS at 14:49

## 2018-03-11 RX ADMIN — Medication 1 TABLET(S): at 12:54

## 2018-03-11 RX ADMIN — Medication 25 GRAM(S): at 07:03

## 2018-03-11 RX ADMIN — PIPERACILLIN AND TAZOBACTAM 200 GRAM(S): 4; .5 INJECTION, POWDER, LYOPHILIZED, FOR SOLUTION INTRAVENOUS at 04:56

## 2018-03-11 RX ADMIN — Medication 5 MILLIGRAM(S): at 20:57

## 2018-03-11 RX ADMIN — Medication 1 MILLIGRAM(S): at 12:53

## 2018-03-11 RX ADMIN — Medication 650 MILLIGRAM(S): at 23:29

## 2018-03-11 RX ADMIN — Medication 25 GRAM(S): at 06:33

## 2018-03-11 RX ADMIN — MILRINONE LACTATE 9 MICROGRAM(S)/KG/MIN: 1 INJECTION, SOLUTION INTRAVENOUS at 17:11

## 2018-03-11 RX ADMIN — Medication 100 MILLIGRAM(S): at 12:54

## 2018-03-11 RX ADMIN — HEPARIN SODIUM 5000 UNIT(S): 5000 INJECTION INTRAVENOUS; SUBCUTANEOUS at 14:50

## 2018-03-11 RX ADMIN — Medication 250 MILLIGRAM(S): at 20:59

## 2018-03-11 RX ADMIN — Medication 125 MILLILITER(S): at 09:34

## 2018-03-11 RX ADMIN — MILRINONE LACTATE 9 MICROGRAM(S)/KG/MIN: 1 INJECTION, SOLUTION INTRAVENOUS at 23:29

## 2018-03-11 RX ADMIN — SODIUM CHLORIDE 2000 MILLILITER(S): 9 INJECTION INTRAMUSCULAR; INTRAVENOUS; SUBCUTANEOUS at 04:25

## 2018-03-11 RX ADMIN — PIPERACILLIN AND TAZOBACTAM 200 GRAM(S): 4; .5 INJECTION, POWDER, LYOPHILIZED, FOR SOLUTION INTRAVENOUS at 20:59

## 2018-03-11 RX ADMIN — HEPARIN SODIUM 5000 UNIT(S): 5000 INJECTION INTRAVENOUS; SUBCUTANEOUS at 23:29

## 2018-03-11 RX ADMIN — HEPARIN SODIUM 5000 UNIT(S): 5000 INJECTION INTRAVENOUS; SUBCUTANEOUS at 06:33

## 2018-03-11 RX ADMIN — Medication 6 MICROGRAM(S)/KG/MIN: at 06:40

## 2018-03-11 RX ADMIN — Medication 1.5 MICROGRAM(S)/KG/MIN: at 06:33

## 2018-03-11 RX ADMIN — Medication 125 MILLILITER(S): at 16:00

## 2018-03-11 RX ADMIN — SODIUM CHLORIDE 2000 MILLILITER(S): 9 INJECTION INTRAMUSCULAR; INTRAVENOUS; SUBCUTANEOUS at 04:56

## 2018-03-11 NOTE — CONSULT NOTE ADULT - PROBLEM SELECTOR RECOMMENDATION 2
Hyperkalemia now improved with k 4.8 present likely due to GILMER/CKD     Plan:  Low K/Low phos/renal diet  Can add po sodium bicarbonate 650mg po tid for now for help shift of K for now.  Will consider Lasix once intravascular repletion  Monitor BMP every 12 hrly

## 2018-03-11 NOTE — DIETITIAN INITIAL EVALUATION ADULT. - ADHERENCE
Requesting regular gingerale; discussed DM diet - pt states he has had prior education & Did not want refresher./poor

## 2018-03-11 NOTE — CONSULT NOTE ADULT - SUBJECTIVE AND OBJECTIVE BOX
Patient is a 63y old  Male who presents with a chief complaint of     HPI:  62 yo M history of HFrEF 10-15% 2/2 ischemic cardiomyopathy, MI , s/p AICD vs PPM, ?Afib, Hypertension, Diabetes Mellitus Type 2 on insulin, CKD?and gout, who presents with a chief complaint of generalized weakness. Pt wad admitted to [facility] 2 months ago for gout and was sent to rehab. He was discharged home mid Feb with VNS. In the past 2 weeks patient has noted increased leg pain and weakness bilaterally. In the last few days, he has also experienced generalized weakness prompting him to come to ER.   In ER, noted to have t max of 102, SBP 70s, leukocytosis to 32.8, Lactate of 6.6, Acute renal failure with severe metabolic derangements. Given 3.5L fluids and Vanc/Zosyn. MICU consulted. (10 Mar 2018 18:51)      PAST MEDICAL & SURGICAL HISTORY:  Type 2 diabetes mellitus with diabetic peripheral angiopathy without gangrene, with long-term curren  Essential hypertension, benign  Gout  Pacemaker  Chronic systolic heart failure  Myocardial infarction  No significant past surgical history      Allergies    No Known Allergies    Intolerances    Home Medications:   * Patient Currently Takes Medications as of 10-Mar-2018 15:57 documented in Structured Notes  · 	torsemide 20 mg oral tablet: 1 tab(s) orally once a day  · 	Toprol- mg oral tablet, extended release: 1 tab(s) orally once a day  · 	Januvia 25 mg oral tablet: 1 tab(s) orally once a day  · 	warfarin 5 mg oral tablet: 1 tab(s) orally once a day  · 	Plavix 75 mg oral tablet: 1 tab(s) orally once a day      FAMILY HISTORY:  Lives alone at home    SOCIAL HISTORY:  Denies smoking, EOTH, Drug    MEDICATIONS  (STANDING):  dextrose 5%. 1000 milliLiter(s) (50 mL/Hr) IV Continuous <Continuous>  dextrose 50% Injectable 12.5 Gram(s) IV Push once  dextrose 50% Injectable 25 Gram(s) IV Push once  dextrose 50% Injectable 25 Gram(s) IV Push once  folic acid 1 milliGRAM(s) Oral daily  heparin  Injectable 5000 Unit(s) SubCutaneous every 8 hours  insulin lispro (HumaLOG) corrective regimen sliding scale   SubCutaneous Before meals and at bedtime  multivitamin 1 Tablet(s) Oral daily  norepinephrine Infusion 0.01 MICROgram(s)/kG/Min (1.5 mL/Hr) IV Continuous <Continuous>  piperacillin/tazobactam IVPB. 2.25 Gram(s) IV Intermittent every 8 hours  thiamine 100 milliGRAM(s) Oral daily    MEDICATIONS  (PRN):  benzocaine 15 mG/menthol 3.6 mG Lozenge 1 Lozenge Oral every 4 hours PRN Sore Throat  dextrose Gel 1 Dose(s) Oral once PRN Blood Glucose LESS THAN 70 milliGRAM(s)/deciliter  glucagon  Injectable 1 milliGRAM(s) IntraMuscular once PRN Glucose LESS THAN 70 milligrams/deciliter      REVIEW OF SYSTEMS:    CONSTITUTIONAL: Fatigue and tired,  No fever or chills  EYES: No blurred or double vision.  ENT: No recent URI or sore throat  RESPIRATORY: Shortness of breath, Denies any cough, hemoptysis  CARDIOVASCULAR: Dyspnea, leg edema, 2 to 3 pillow orthopnea.  GASTROINTESTINAL: No abdominal or flank pain, No nausea or vomiting, No diarrhea  GENITOURINARY: No dysuria or urinary burning, No difficulty passing urine, No hematuria  NEUROLOGICAL: Drowsy and lethargic, fatigue, tired, denies any headaches or blurred vision  SKIN: No skin rashes   MUSCULOSKELETAL: Bilateral leg edema+nt         PHYSICAL EXAM:  GENERAL: Ill appearing, alert and awake but lethargic, slow to respond in no acute distress at present  HEAD:  Atraumatic, Normocephalic,   EYES: Bilateral conjuctival and scleral pallor+nt  Oral cavity: Oral mucosa dry and pale  NECK: Neck supple, No JVD  CHEST/LUNG: Bilateral decreased breath sounds, Right>left, Bilateral crepitations+nt, No wheezing  HEART: Regular rate and rhythm. HOMER III/VI at LPSB, No gallop, no rub   ABDOMEN: Soft, Nontender, non distended, BS+nt, No flank tenderness.   EXTREMITIES: Bilateral chronic venous stasis and Pitting leg edema++nt  Neurology: AAOx2-3, Awake but lethargic, no focal neurological deficit, Able to comprehend and answers questions slowly at present.        CAPILLARY BLOOD GLUCOSE      POCT Blood Glucose.: 144 mg/dL (11 Mar 2018 12:28)  POCT Blood Glucose.: 86 mg/dL (11 Mar 2018 06:55)  POCT Blood Glucose.: 65 mg/dL (11 Mar 2018 06:06)  POCT Blood Glucose.: 93 mg/dL (11 Mar 2018 05:19)  POCT Blood Glucose.: 133 mg/dL (11 Mar 2018 03:58)  POCT Blood Glucose.: 196 mg/dL (11 Mar 2018 01:07)  POCT Blood Glucose.: 230 mg/dL (10 Mar 2018 23:31)  POCT Blood Glucose.: 262 mg/dL (10 Mar 2018 22:58)  POCT Blood Glucose.: 268 mg/dL (10 Mar 2018 21:57)  POCT Blood Glucose.: 269 mg/dL (10 Mar 2018 21:24)      I&O's Summary    10 Mar 2018 06:01  -  11 Mar 2018 07:00  --------------------------------------------------------  IN: 1418.1 mL / OUT: 255 mL / NET: 1163.1 mL    11 Mar 2018 07:01  -  11 Mar 2018 13:39  --------------------------------------------------------  IN: 277.5 mL / OUT: 30 mL / NET: 247.5 mL          LABS:                                                   03-11-18 @ 05:21    135  |  102  |  82<H>  ----------------------------<  115<H>  4.8   |  16<L>  |  3.93<H>                                                 03-10-18 @ 23:31    135  |  100  |  85<H>  ----------------------------<  261<H>  5.2   |  19<L>  |  3.95<H>                                                 03-10-18 @ 19:09    131<L>  |  96  |  82<H>  ----------------------------<  292<H>  5.7<H>   |  15<L>  |  3.92<H>                                                 03-10-18 @ 15:21    135  |  99  |  79<H>  ----------------------------<  317<H>  5.7<H>   |  15<L>  |  3.90<H>                                                 03-10-18 @ 13:47    135  |  95<L>  |  80<H>  ----------------------------<  309<H>  6.0<H>   |  17<L>  |  3.93<H>    Ca    8.3<L>      11 Mar 2018 05:21  Ca    8.1<L>      10 Mar 2018 23:31  Ca    8.6      10 Mar 2018 19:09  Ca    8.4      10 Mar 2018 15:21  Ca    9.3      10 Mar 2018 13:47  Phos  3.5     03-11  Phos  4.1     03-10  Mg     2.0     03-11  Mg     2.0     03-10    TPro  6.7  /  Alb  2.1<L>  /  TBili  2.4<H>  /  DBili  x   /  AST  22  /  ALT  14  /  AlkPhos  135<H>  03-11  TPro  7.0  /  Alb  2.2<L>  /  TBili  3.7<H>  /  DBili  x   /  AST  23  /  ALT  15  /  AlkPhos  153<H>  03-10  TPro  6.7  /  Alb  2.3<L>  /  TBili  3.4<H>  /  DBili  2.0<H>  /  AST  17  /  ALT  13  /  AlkPhos  154<H>  03-10  TPro  8.4<H>  /  Alb  2.8<L>  /  TBili  3.6<H>  /  DBili  x   /  AST  22  /  ALT  15  /  AlkPhos  213<H>  03-10                          9.7    30.1  )-----------( 180      ( 11 Mar 2018 05:21 )             31.4     CBC Full  -  ( 11 Mar 2018 05:21 )  WBC Count : 30.1 K/uL  Hemoglobin : 9.7 g/dL  Hematocrit : 31.4 %  Platelet Count - Automated : 180 K/uL  Mean Cell Volume : 90.0 fL      CBC Full  -  ( 10 Mar 2018 13:48 )  WBC Count : 32.8 K/uL  Hemoglobin : 11.5 g/dL  Hematocrit : 35.3 %  Platelet Count - Automated : 219 K/uL  Mean Cell Volume : 88.9 fL        PT/INR - ( 11 Mar 2018 05:21 )   PT: 26.1 sec;   INR: 2.31          PTT - ( 11 Mar 2018 05:21 )  PTT:37.9 sec  CARDIAC MARKERS ( 10 Mar 2018 19:09 )  x     / 0.08 ng/mL / 380 U/L / x     / 2.3 ng/mL  CARDIAC MARKERS ( 10 Mar 2018 15:21 )  x     / 0.08 ng/mL / 283 U/L / x     / 1.7 ng/mL      Urinalysis Basic - ( 10 Mar 2018 16:31 )    Color: Yellow / Appearance: SL Cloudy / SG: >=1.030 / pH: x  Gluc: x / Ketone: Trace mg/dL  / Bili: Moderate / Urobili: 2.0 E.U./dL   Blood: x / Protein: 30 mg/dL / Nitrite: NEGATIVE   Leuk Esterase: NEGATIVE / RBC: Many /HPF / WBC < 5 /HPF   Sq Epi: x / Non Sq Epi: 5-10 /HPF / Bacteria: Present /HPF        RADIOLOGY & ADDITIONAL TESTS:  < from: Xray Chest 1 View- PORTABLE-Routine (03.11.18 @ 06:14) >    EXAM:  XR CHEST PORTABLE ROUTINE 1V                          PROCEDURE DATE:  03/11/2018                     INTERPRETATION:  Clinical History: Fever    Portable examination the chest demonstrates cardiomegaly. Right effusion.   No interval change lung pathology in comparison to prior examination of   the chest 3/10/2018. No interval change position pacer wire.    Impression: No interval change lung pathology            "Thank you for the opportunity to participate in the care of this   patient."        RADHA MAXWELL M.D., ATTENDING RADIOLOGIST  This document has been electronically signed. Mar 11 2018  9:31AM                  < end of copied text >      EKG/Telemetry: Reviewed Patient is a 63y old  Male who presents with a chief complaint of     HPI:  64 yo M history of HFrEF 10-15% 2/2 ischemic cardiomyopathy, MI , s/p AICD vs PPM, ?Afib, Hypertension, Diabetes Mellitus Type 2 on insulin, CKD?and gout, who presents with a chief complaint of generalized weakness. Pt wad admitted to [facility] 2 months ago for gout and was sent to rehab. He was discharged home mid Feb with VNS. In the past 2 weeks patient has noted increased leg pain and weakness bilaterally. In the last few days, he has also experienced generalized weakness prompting him to come to ER.   In ER, noted to have t max of 102, SBP 70s, leukocytosis to 32.8, Lactate of 6.6, Acute renal failure with severe metabolic derangements. Given 3.5L fluids and Vanc/Zosyn. MICU consulted. (10 Mar 2018 18:51)  Patient is poor historian. Limited history obtained from Chart review, medical team and patient at bedside. No family. Patient denies any nausea, vomiting, diarrhea at present. Patient awake but lethargic at present.       PAST MEDICAL & SURGICAL HISTORY:  Type 2 diabetes mellitus with diabetic peripheral angiopathy without gangrene, with long-term curren  Essential hypertension, benign  Gout  Pacemaker  Chronic systolic heart failure  Myocardial infarction  No significant past surgical history      Allergies    No Known Allergies    Intolerances    Home Medications:   * Patient Currently Takes Medications as of 10-Mar-2018 15:57 documented in Structured Notes  · 	torsemide 20 mg oral tablet: 1 tab(s) orally once a day  · 	Toprol- mg oral tablet, extended release: 1 tab(s) orally once a day  · 	Januvia 25 mg oral tablet: 1 tab(s) orally once a day  · 	warfarin 5 mg oral tablet: 1 tab(s) orally once a day  · 	Plavix 75 mg oral tablet: 1 tab(s) orally once a day      FAMILY HISTORY:  Lives alone at home    SOCIAL HISTORY:  Denies smoking, EOTH, Drug    MEDICATIONS  (STANDING):  dextrose 5%. 1000 milliLiter(s) (50 mL/Hr) IV Continuous <Continuous>  dextrose 50% Injectable 12.5 Gram(s) IV Push once  dextrose 50% Injectable 25 Gram(s) IV Push once  dextrose 50% Injectable 25 Gram(s) IV Push once  folic acid 1 milliGRAM(s) Oral daily  heparin  Injectable 5000 Unit(s) SubCutaneous every 8 hours  insulin lispro (HumaLOG) corrective regimen sliding scale   SubCutaneous Before meals and at bedtime  multivitamin 1 Tablet(s) Oral daily  norepinephrine Infusion 0.01 MICROgram(s)/kG/Min (1.5 mL/Hr) IV Continuous <Continuous>  piperacillin/tazobactam IVPB. 2.25 Gram(s) IV Intermittent every 8 hours  thiamine 100 milliGRAM(s) Oral daily    MEDICATIONS  (PRN):  benzocaine 15 mG/menthol 3.6 mG Lozenge 1 Lozenge Oral every 4 hours PRN Sore Throat  dextrose Gel 1 Dose(s) Oral once PRN Blood Glucose LESS THAN 70 milliGRAM(s)/deciliter  glucagon  Injectable 1 milliGRAM(s) IntraMuscular once PRN Glucose LESS THAN 70 milligrams/deciliter      REVIEW OF SYSTEMS:    CONSTITUTIONAL: Fatigue and tired,  No fever or chills  EYES: No blurred or double vision.  ENT: No recent URI or sore throat  RESPIRATORY: Shortness of breath, Denies any cough, hemoptysis  CARDIOVASCULAR: Dyspnea, leg edema, 2 to 3 pillow orthopnea.  GASTROINTESTINAL: No abdominal or flank pain, No nausea or vomiting, No diarrhea  GENITOURINARY: No dysuria or urinary burning, No difficulty passing urine, No hematuria  NEUROLOGICAL: Drowsy and lethargic, fatigue, tired, denies any headaches or blurred vision  SKIN: No skin rashes   MUSCULOSKELETAL: Bilateral leg edema+nt         PHYSICAL EXAM:  GENERAL: Ill appearing, alert and awake but lethargic, slow to respond in no acute distress at present  HEAD:  Atraumatic, Normocephalic,   EYES: Bilateral conjuctival and scleral pallor+nt  Oral cavity: Oral mucosa dry and pale  NECK: Neck supple, No JVD  CHEST/LUNG: Bilateral decreased breath sounds, Right>left, Bilateral crepitations+nt, No wheezing  HEART: Regular rate and rhythm. HOMER III/VI at LPSB, No gallop, no rub   ABDOMEN: Soft, Nontender, non distended, BS+nt, No flank tenderness.   EXTREMITIES: Bilateral chronic venous stasis and Pitting leg edema++nt  Neurology: AAOx2-3, Awake but lethargic, no focal neurological deficit, Able to comprehend and answers questions slowly at present. No asterixis at present.         CAPILLARY BLOOD GLUCOSE      POCT Blood Glucose.: 144 mg/dL (11 Mar 2018 12:28)  POCT Blood Glucose.: 86 mg/dL (11 Mar 2018 06:55)  POCT Blood Glucose.: 65 mg/dL (11 Mar 2018 06:06)  POCT Blood Glucose.: 93 mg/dL (11 Mar 2018 05:19)  POCT Blood Glucose.: 133 mg/dL (11 Mar 2018 03:58)  POCT Blood Glucose.: 196 mg/dL (11 Mar 2018 01:07)  POCT Blood Glucose.: 230 mg/dL (10 Mar 2018 23:31)  POCT Blood Glucose.: 262 mg/dL (10 Mar 2018 22:58)  POCT Blood Glucose.: 268 mg/dL (10 Mar 2018 21:57)  POCT Blood Glucose.: 269 mg/dL (10 Mar 2018 21:24)      I&O's Summary    10 Mar 2018 06:01  -  11 Mar 2018 07:00  --------------------------------------------------------  IN: 1418.1 mL / OUT: 255 mL / NET: 1163.1 mL    11 Mar 2018 07:01  -  11 Mar 2018 13:39  --------------------------------------------------------  IN: 277.5 mL / OUT: 30 mL / NET: 247.5 mL          LABS:                                                   03-11-18 @ 05:21    135  |  102  |  82<H>  ----------------------------<  115<H>  4.8   |  16<L>  |  3.93<H>                                                 03-10-18 @ 23:31    135  |  100  |  85<H>  ----------------------------<  261<H>  5.2   |  19<L>  |  3.95<H>                                                 03-10-18 @ 19:09    131<L>  |  96  |  82<H>  ----------------------------<  292<H>  5.7<H>   |  15<L>  |  3.92<H>                                                 03-10-18 @ 15:21    135  |  99  |  79<H>  ----------------------------<  317<H>  5.7<H>   |  15<L>  |  3.90<H>                                                 03-10-18 @ 13:47    135  |  95<L>  |  80<H>  ----------------------------<  309<H>  6.0<H>   |  17<L>  |  3.93<H>    Ca    8.3<L>      11 Mar 2018 05:21  Ca    8.1<L>      10 Mar 2018 23:31  Ca    8.6      10 Mar 2018 19:09  Ca    8.4      10 Mar 2018 15:21  Ca    9.3      10 Mar 2018 13:47  Phos  3.5     03-11  Phos  4.1     03-10  Mg     2.0     03-11  Mg     2.0     03-10    TPro  6.7  /  Alb  2.1<L>  /  TBili  2.4<H>  /  DBili  x   /  AST  22  /  ALT  14  /  AlkPhos  135<H>  03-11  TPro  7.0  /  Alb  2.2<L>  /  TBili  3.7<H>  /  DBili  x   /  AST  23  /  ALT  15  /  AlkPhos  153<H>  03-10  TPro  6.7  /  Alb  2.3<L>  /  TBili  3.4<H>  /  DBili  2.0<H>  /  AST  17  /  ALT  13  /  AlkPhos  154<H>  03-10  TPro  8.4<H>  /  Alb  2.8<L>  /  TBili  3.6<H>  /  DBili  x   /  AST  22  /  ALT  15  /  AlkPhos  213<H>  03-10                          9.7    30.1  )-----------( 180      ( 11 Mar 2018 05:21 )             31.4     CBC Full  -  ( 11 Mar 2018 05:21 )  WBC Count : 30.1 K/uL  Hemoglobin : 9.7 g/dL  Hematocrit : 31.4 %  Platelet Count - Automated : 180 K/uL  Mean Cell Volume : 90.0 fL      CBC Full  -  ( 10 Mar 2018 13:48 )  WBC Count : 32.8 K/uL  Hemoglobin : 11.5 g/dL  Hematocrit : 35.3 %  Platelet Count - Automated : 219 K/uL  Mean Cell Volume : 88.9 fL        PT/INR - ( 11 Mar 2018 05:21 )   PT: 26.1 sec;   INR: 2.31          PTT - ( 11 Mar 2018 05:21 )  PTT:37.9 sec  CARDIAC MARKERS ( 10 Mar 2018 19:09 )  x     / 0.08 ng/mL / 380 U/L / x     / 2.3 ng/mL  CARDIAC MARKERS ( 10 Mar 2018 15:21 )  x     / 0.08 ng/mL / 283 U/L / x     / 1.7 ng/mL      Urinalysis Basic - ( 10 Mar 2018 16:31 )    Color: Yellow / Appearance: SL Cloudy / SG: >=1.030 / pH: x  Gluc: x / Ketone: Trace mg/dL  / Bili: Moderate / Urobili: 2.0 E.U./dL   Blood: x / Protein: 30 mg/dL / Nitrite: NEGATIVE   Leuk Esterase: NEGATIVE / RBC: Many /HPF / WBC < 5 /HPF   Sq Epi: x / Non Sq Epi: 5-10 /HPF / Bacteria: Present /HPF        RADIOLOGY & ADDITIONAL TESTS:  < from: Xray Chest 1 View- PORTABLE-Routine (03.11.18 @ 06:14) >    EXAM:  XR CHEST PORTABLE ROUTINE 1V                          PROCEDURE DATE:  03/11/2018                     INTERPRETATION:  Clinical History: Fever    Portable examination the chest demonstrates cardiomegaly. Right effusion.   No interval change lung pathology in comparison to prior examination of   the chest 3/10/2018. No interval change position pacer wire.    Impression: No interval change lung pathology            "Thank you for the opportunity to participate in the care of this   patient."        RADHA MAXWELL M.D., ATTENDING RADIOLOGIST  This document has been electronically signed. Mar 11 2018  9:31AM                  < end of copied text >      EKG/Telemetry: Reviewed

## 2018-03-11 NOTE — DIETITIAN INITIAL EVALUATION ADULT. - OTHER INFO
64 yo M history of HFrEF 10-15% 2/2 ischemic cardiomyopathy, MI , s/p AICD vs PPM, ?Afib, Hypertension, Diabetes Mellitus Type 2 on insulin, CKD?and gout, who presents with a chief complaint of generalized weakness admitted for severe sepsis 2/2 LE cellulitis vs pneumonia. Reports a poor appetite PTA, however appetite has now improved. States weight fluctuates between 165-180#. No chewing/swallowing difficulty. No GI distress at present or pain reported.

## 2018-03-11 NOTE — CONSULT NOTE ADULT - PROBLEM SELECTOR RECOMMENDATION 9
Oliguric GILMER on probable CKD of unknwon duration and staging now with fluid overload likely due to Ischemic ATN with intravascular volume depletion with extravascular volume overload due to severe Cardio-renal disease due to Cardiogenic/septic shock with leukocytosis with cellulitis    Plan:  Urinalysis with 1+ protein and Many RBC at present after barnes's catheter placement of unkown significance.  Strict I/o   Avoid nephrotoxic agents  Intermittent IV fluid bolus given overnight  Consider IV albumin 50mg 25% IV Q6 hrly for now  Consider CVP monitoring for better fluid management assessment  Bilateral renal and bladder sonogram with venous doppler to r/o renal vein thrombosis  Once adequate fluid repletion ascertained if no improvement of renal output can consider high dose lasix challenge with IV 80 mg  Continue IV pressors to keep MAP>65 mmhg.  Volume status Hypervolemic at present with oliguric renal failure. Hyperkalemia improved. ABG with respiratory alkalosis and increased aniongap metabolic acidosis.   Obtain Echocardiogram to assess for pericardial effusion/LVEF.  No urgent/emergent indication for RRT/HD at present. Oliguric GILMER on probable CKD of unknwon duration and staging now with fluid overload likely due to Ischemic ATN with intravascular volume depletion with extravascular volume overload due to severe Cardio-renal disease due to Cardiogenic/septic shock with leukocytosis with cellulitis    Plan:  Urinalysis with 1+ protein and Many RBC at present after barnes's catheter placement of unkown significance.   obtain urine protein/creatinine ratio  Strict I/o   Avoid nephrotoxic agents  Intermittent IV fluid bolus given overnight  Consider IV albumin 50mg 25% IV Q6 hrly for now  Consider CVP monitoring for better fluid management assessment  Bilateral renal and bladder sonogram with venous doppler to r/o renal vein thrombosis  Once adequate fluid repletion ascertained if no improvement of renal output can consider high dose lasix challenge with IV 80 mg  Continue IV pressors to keep MAP>65 mmhg.  Volume status Hypervolemic at present with oliguric renal failure. Hyperkalemia improved. ABG with respiratory alkalosis and increased aniongap metabolic acidosis.   Obtain Echocardiogram to assess for pericardial effusion/LVEF.  obtain uric acid level  No urgent/emergent indication for RRT/HD at present.  Adjust antibiotics with renal dose adjustment with Cr Cl<15-20 ml/min for now.  F/u cultures.

## 2018-03-11 NOTE — CONSULT NOTE ADULT - ASSESSMENT
64 yo M history of HFrEF 10-15% 2/2 ischemic cardiomyopathy, MI , s/p AICD vs PPM, ?Afib, Hypertension, Diabetes Mellitus Type 2 on insulin, CKD?and gout, who presents with a chief complaint of generalized weakness. Nephrology consult called for GILMER

## 2018-03-11 NOTE — PROGRESS NOTE ADULT - ATTENDING COMMENTS
Pt on 3 mcg/min Levo with anuria and low VBG sat and elevated lactate. Dobutamine stopped due to worsening hypotension and tachycardia. Fluid bolus given and inices of perfusion to be repeated. titrate Levo to SBP of 90 with severe LV dysfunction and keep Hb 9-10. If remains hypoperfused will consider Primacor. continue abx. Renal consult. wound care. Repeat blood cultures. Glycemic control.

## 2018-03-11 NOTE — PHYSICAL THERAPY INITIAL EVALUATION ADULT - PLANNED THERAPY INTERVENTIONS, PT EVAL
bed mobility training/balance training/ROM/postural re-education/stretching/transfer training/gait training/strengthening

## 2018-03-11 NOTE — PHYSICAL THERAPY INITIAL EVALUATION ADULT - ADDITIONAL COMMENTS
Patient states he was ambulatory with indoor distances no DME, no steps upon entry, no reported falls at home, receiving HHA 3 days for 4 hours

## 2018-03-11 NOTE — PHYSICAL THERAPY INITIAL EVALUATION ADULT - MODALITIES TREATMENT COMMENTS
supine AAROM heel slides x 5 bilateral hip ABD bilateral x 5 seated dangle time 15 minutes AAROM long arc quads x 5 AAROM bilateral shoulder press x 10, unilateral rows hand held with trunk rotation x 10 for 2 sets

## 2018-03-11 NOTE — CONSULT NOTE ADULT - ATTENDING COMMENTS
Chart/labs/vs's reviewed at length and case d/w renal fellow, Dr. Abreu.  Pt hypotensive. Sepsis picture.  Monitor respiratory status with volume resuscitation.   We will continue to follow with you.

## 2018-03-11 NOTE — PHYSICAL THERAPY INITIAL EVALUATION ADULT - ACTIVE RANGE OF MOTION EXAMINATION, REHAB EVAL
bilateral upper extremity Active ROM was WFL (within functional limits)/Left knee flexion limited to 3/4 range secondary to pain RLE WFL throughout

## 2018-03-11 NOTE — PHYSICAL THERAPY INITIAL EVALUATION ADULT - IMPAIRMENTS FOUND, PT EVAL
joint integrity and mobility/posture/muscle strength/ROM/aerobic capacity/endurance/gait, locomotion, and balance

## 2018-03-11 NOTE — PROGRESS NOTE ADULT - SUBJECTIVE AND OBJECTIVE BOX
INTERVAL HPI/OVERNIGHT EVENTS:    SUBJECTIVE: Patient seen and examined at bedside.    OBJECTIVE:    VITAL SIGNS:  ICU Vital Signs Last 24 Hrs  T(C): 37.1 (11 Mar 2018 09:01), Max: 38.9 (10 Mar 2018 13:20)  T(F): 98.8 (11 Mar 2018 09:01), Max: 102 (10 Mar 2018 13:20)  HR: 102 (11 Mar 2018 11:20) (88 - 139)  BP: 91/71 (11 Mar 2018 11:20) (72/61 - 103/70)  BP(mean): 76 (11 Mar 2018 11:20) (63 - 84)  ABP: --  ABP(mean): --  RR: 24 (11 Mar 2018 11:20) (11 - 30)  SpO2: 100% (11 Mar 2018 11:20) (68% - 100%)        03-10 @ 06:01  -  03-11 @ 07:00  --------------------------------------------------------  IN: 1418.1 mL / OUT: 255 mL / NET: 1163.1 mL    03-11 @ 07:01  -  03-11 @ 12:28  --------------------------------------------------------  IN: 270.5 mL / OUT: 30 mL / NET: 240.5 mL      CAPILLARY BLOOD GLUCOSE      POCT Blood Glucose.: 86 mg/dL (11 Mar 2018 06:55)      PHYSICAL EXAM:    General: WDWN ; NAD  HEENT: NC/AT; PERRL, anicteric sclera  Neck: supple, no JVD  Respiratory: CTA B/L; no W/R/R  Cardiovascular: +S1/S2; RRR; no M/R/G  Gastrointestinal: soft, NT/ND; +BS x4  Extremities: WWP; 2+ peripheral pulses B/L; no LE edema  Skin: normal color and turgor; no rash  Neurological:     MEDICATIONS:  MEDICATIONS  (STANDING):  DOBUTamine Infusion 2.5 MICROgram(s)/kG/Min (6 mL/Hr) IV Continuous <Continuous>  folic acid 1 milliGRAM(s) Oral daily  heparin  Injectable 5000 Unit(s) SubCutaneous every 8 hours  multivitamin 1 Tablet(s) Oral daily  norepinephrine Infusion 0.01 MICROgram(s)/kG/Min (1.5 mL/Hr) IV Continuous <Continuous>  piperacillin/tazobactam IVPB. 2.25 Gram(s) IV Intermittent every 8 hours  thiamine 100 milliGRAM(s) Oral daily    MEDICATIONS  (PRN):  benzocaine 15 mG/menthol 3.6 mG Lozenge 1 Lozenge Oral every 4 hours PRN Sore Throat      ALLERGIES:  Allergies    No Known Allergies    Intolerances        LABS:                        9.7    30.1  )-----------( 180      ( 11 Mar 2018 05:21 )             31.4     03-11    135  |  102  |  82<H>  ----------------------------<  115<H>  4.8   |  16<L>  |  3.93<H>    Ca    8.3<L>      11 Mar 2018 05:21  Phos  3.5     03-11  Mg     2.0     03-11    TPro  6.7  /  Alb  2.1<L>  /  TBili  2.4<H>  /  DBili  x   /  AST  22  /  ALT  14  /  AlkPhos  135<H>  03-11    LIVER FUNCTIONS - ( 11 Mar 2018 05:21 )  Alb: 2.1 g/dL / Pro: 6.7 g/dL / ALK PHOS: 135 U/L / ALT: 14 U/L / AST: 22 U/L / GGT: x           PT/INR - ( 11 Mar 2018 05:21 )   PT: 26.1 sec;   INR: 2.31          PTT - ( 11 Mar 2018 05:21 )  PTT:37.9 sec  Urinalysis Basic - ( 10 Mar 2018 16:31 )    Color: Yellow / Appearance: SL Cloudy / SG: >=1.030 / pH: x  Gluc: x / Ketone: Trace mg/dL  / Bili: Moderate / Urobili: 2.0 E.U./dL   Blood: x / Protein: 30 mg/dL / Nitrite: NEGATIVE   Leuk Esterase: NEGATIVE / RBC: Many /HPF / WBC < 5 /HPF   Sq Epi: x / Non Sq Epi: 5-10 /HPF / Bacteria: Present /HPF      CARDIAC MARKERS ( 10 Mar 2018 19:09 )  x     / 0.08 ng/mL / 380 U/L / x     / 2.3 ng/mL  CARDIAC MARKERS ( 10 Mar 2018 15:21 )  x     / 0.08 ng/mL / 283 U/L / x     / 1.7 ng/mL        RADIOLOGY & ADDITIONAL TESTS: Reviewed.    ASSESSMENT:    PLAN:     CARDIO    PULM    RENAL    NEURO    GI/FEN: no IVF; replete lytes prn; NPO    PPx: HSQ.    CODE: FULL.    Dispo: ICU INTERVAL HPI/OVERNIGHT EVENTS: Urine output decreased to 15 per hour, patient received 2x 500cc NS bolus, started levophed for hypotension and dobutamine started (since discontinued); hypoglycemic to 60s and received 2 amps D50    SUBJECTIVE: Patient seen and examined at bedside.    OBJECTIVE:    VITAL SIGNS:  ICU Vital Signs Last 24 Hrs  T(C): 37.1 (11 Mar 2018 09:01), Max: 38.9 (10 Mar 2018 13:20)  T(F): 98.8 (11 Mar 2018 09:01), Max: 102 (10 Mar 2018 13:20)  HR: 102 (11 Mar 2018 11:20) (88 - 139)  BP: 91/71 (11 Mar 2018 11:20) (72/61 - 103/70)  BP(mean): 76 (11 Mar 2018 11:20) (63 - 84)  ABP: --  ABP(mean): --  RR: 24 (11 Mar 2018 11:20) (11 - 30)  SpO2: 100% (11 Mar 2018 11:20) (68% - 100%)        03-10 @ 06:01 - 03-11 @ 07:00  --------------------------------------------------------  IN: 1418.1 mL / OUT: 255 mL / NET: 1163.1 mL    03-11 @ 07:01  -  03-11 @ 12:28  --------------------------------------------------------  IN: 270.5 mL / OUT: 30 mL / NET: 240.5 mL      CAPILLARY BLOOD GLUCOSE      POCT Blood Glucose.: 86 mg/dL (11 Mar 2018 06:55)      PHYSICAL EXAM:    General: WDWN ; NAD  HEENT: NC/AT; PERRL, anicteric sclera  Neck: supple, no JVD  Respiratory: CTA B/L; no W/R/R  Cardiovascular: +S1/S2; RRR; no M/R/G  Gastrointestinal: soft, NT/ND; +BS x4  Extremities: WWP; 2+ peripheral pulses B/L; no LE edema  Skin: normal color and turgor; no rash  Neurological:     MEDICATIONS:  MEDICATIONS  (STANDING):  DOBUTamine Infusion 2.5 MICROgram(s)/kG/Min (6 mL/Hr) IV Continuous <Continuous>  folic acid 1 milliGRAM(s) Oral daily  heparin  Injectable 5000 Unit(s) SubCutaneous every 8 hours  multivitamin 1 Tablet(s) Oral daily  norepinephrine Infusion 0.01 MICROgram(s)/kG/Min (1.5 mL/Hr) IV Continuous <Continuous>  piperacillin/tazobactam IVPB. 2.25 Gram(s) IV Intermittent every 8 hours  thiamine 100 milliGRAM(s) Oral daily    MEDICATIONS  (PRN):  benzocaine 15 mG/menthol 3.6 mG Lozenge 1 Lozenge Oral every 4 hours PRN Sore Throat      ALLERGIES:  Allergies    No Known Allergies    Intolerances        LABS:                        9.7    30.1  )-----------( 180      ( 11 Mar 2018 05:21 )             31.4     03-11    135  |  102  |  82<H>  ----------------------------<  115<H>  4.8   |  16<L>  |  3.93<H>    Ca    8.3<L>      11 Mar 2018 05:21  Phos  3.5     03-11  Mg     2.0     03-11    TPro  6.7  /  Alb  2.1<L>  /  TBili  2.4<H>  /  DBili  x   /  AST  22  /  ALT  14  /  AlkPhos  135<H>  03-11    LIVER FUNCTIONS - ( 11 Mar 2018 05:21 )  Alb: 2.1 g/dL / Pro: 6.7 g/dL / ALK PHOS: 135 U/L / ALT: 14 U/L / AST: 22 U/L / GGT: x           PT/INR - ( 11 Mar 2018 05:21 )   PT: 26.1 sec;   INR: 2.31          PTT - ( 11 Mar 2018 05:21 )  PTT:37.9 sec  Urinalysis Basic - ( 10 Mar 2018 16:31 )    Color: Yellow / Appearance: SL Cloudy / SG: >=1.030 / pH: x  Gluc: x / Ketone: Trace mg/dL  / Bili: Moderate / Urobili: 2.0 E.U./dL   Blood: x / Protein: 30 mg/dL / Nitrite: NEGATIVE   Leuk Esterase: NEGATIVE / RBC: Many /HPF / WBC < 5 /HPF   Sq Epi: x / Non Sq Epi: 5-10 /HPF / Bacteria: Present /HPF      CARDIAC MARKERS ( 10 Mar 2018 19:09 )  x     / 0.08 ng/mL / 380 U/L / x     / 2.3 ng/mL  CARDIAC MARKERS ( 10 Mar 2018 15:21 )  x     / 0.08 ng/mL / 283 U/L / x     / 1.7 ng/mL        RADIOLOGY & ADDITIONAL TESTS: Reviewed.    ASSESSMENT:    PLAN:     CARDIO    PULM    RENAL    NEURO    GI/FEN: no IVF; replete lytes prn; NPO    PPx: HSQ.    CODE: FULL.    Dispo: ICU INTERVAL HPI/OVERNIGHT EVENTS: Urine output decreased to 15 per hour, patient received 2x 500cc NS bolus, started levophed for hypotension and dobutamine started (since discontinued); hypoglycemic to 60s and received 2 amps D50    SUBJECTIVE: Patient seen and examined at bedside.    OBJECTIVE:    VITAL SIGNS:  ICU Vital Signs Last 24 Hrs  T(C): 37.1 (11 Mar 2018 09:01), Max: 38.9 (10 Mar 2018 13:20)  T(F): 98.8 (11 Mar 2018 09:01), Max: 102 (10 Mar 2018 13:20)  HR: 102 (11 Mar 2018 11:20) (88 - 139)  BP: 91/71 (11 Mar 2018 11:20) (72/61 - 103/70)  BP(mean): 76 (11 Mar 2018 11:20) (63 - 84)  ABP: --  ABP(mean): --  RR: 24 (11 Mar 2018 11:20) (11 - 30)  SpO2: 100% (11 Mar 2018 11:20) (68% - 100%)        03-10 @ 06:01 - 03-11 @ 07:00  --------------------------------------------------------  IN: 1418.1 mL / OUT: 255 mL / NET: 1163.1 mL    03-11 @ 07:01  -  03-11 @ 12:28  --------------------------------------------------------  IN: 270.5 mL / OUT: 30 mL / NET: 240.5 mL      CAPILLARY BLOOD GLUCOSE      POCT Blood Glucose.: 86 mg/dL (11 Mar 2018 06:55)      PHYSICAL EXAM:    General: Middle aged M in NAD, tired but arousable at time of exam  HEENT: NC/AT, PERRL  Neck: supple  Respiratory: Decreased breath sounds at bilateral bases, no wheeze or rhonchi present. No increased work of breathing at time of exam  Cardiac: Tachycardic, +S1/S2, no murmurs, rubs, or gallops  Gastrointestinal: soft, NT/ND; no rebound or guarding; +BS  Genitourinary: +Jose  Extremities: Bilateral areas of chronic venous stasis with some breakdown noted. 2+ LE edema b/l. 1+ DP pulses b/l   Musculoskeletal: NROM x4; no joint swelling, tenderness or erythema  Neurologic: AAOx3; CNII-XII grossly intact; no focal deficits. Patient tired but arouses to voice at time of exam    MEDICATIONS:  MEDICATIONS  (STANDING):  DOBUTamine Infusion 2.5 MICROgram(s)/kG/Min (6 mL/Hr) IV Continuous <Continuous>  folic acid 1 milliGRAM(s) Oral daily  heparin  Injectable 5000 Unit(s) SubCutaneous every 8 hours  multivitamin 1 Tablet(s) Oral daily  norepinephrine Infusion 0.01 MICROgram(s)/kG/Min (1.5 mL/Hr) IV Continuous <Continuous>  piperacillin/tazobactam IVPB. 2.25 Gram(s) IV Intermittent every 8 hours  thiamine 100 milliGRAM(s) Oral daily    MEDICATIONS  (PRN):  benzocaine 15 mG/menthol 3.6 mG Lozenge 1 Lozenge Oral every 4 hours PRN Sore Throat      ALLERGIES:  Allergies    No Known Allergies    Intolerances        LABS:                        9.7    30.1  )-----------( 180      ( 11 Mar 2018 05:21 )             31.4     03-11    135  |  102  |  82<H>  ----------------------------<  115<H>  4.8   |  16<L>  |  3.93<H>    Ca    8.3<L>      11 Mar 2018 05:21  Phos  3.5     03-11  Mg     2.0     03-11    TPro  6.7  /  Alb  2.1<L>  /  TBili  2.4<H>  /  DBili  x   /  AST  22  /  ALT  14  /  AlkPhos  135<H>  03-11    LIVER FUNCTIONS - ( 11 Mar 2018 05:21 )  Alb: 2.1 g/dL / Pro: 6.7 g/dL / ALK PHOS: 135 U/L / ALT: 14 U/L / AST: 22 U/L / GGT: x           PT/INR - ( 11 Mar 2018 05:21 )   PT: 26.1 sec;   INR: 2.31          PTT - ( 11 Mar 2018 05:21 )  PTT:37.9 sec  Urinalysis Basic - ( 10 Mar 2018 16:31 )    Color: Yellow / Appearance: SL Cloudy / SG: >=1.030 / pH: x  Gluc: x / Ketone: Trace mg/dL  / Bili: Moderate / Urobili: 2.0 E.U./dL   Blood: x / Protein: 30 mg/dL / Nitrite: NEGATIVE   Leuk Esterase: NEGATIVE / RBC: Many /HPF / WBC < 5 /HPF   Sq Epi: x / Non Sq Epi: 5-10 /HPF / Bacteria: Present /HPF      CARDIAC MARKERS ( 10 Mar 2018 19:09 )  x     / 0.08 ng/mL / 380 U/L / x     / 2.3 ng/mL  CARDIAC MARKERS ( 10 Mar 2018 15:21 )  x     / 0.08 ng/mL / 283 U/L / x     / 1.7 ng/mL        RADIOLOGY & ADDITIONAL TESTS: Reviewed.    ASSESSMENT: 63M PMH HFrEF 10-15% (ischemic), MI, s/p AICD vs PPM, possible Afib, HTN, DM2 on insulin, possible CKD, and gout, who presents with a chief complaint of generalized weakness admitted for severe sepsis 2/2 LE cellulitis vs pneumonia     NEURO  #Toxic metabolic encephalopathy  - Appears weak and responds slowly but AOx3. Likely 2/2 severe sepsis. wIll continue monitoring mental status   - Other possible etiologies include uremic encephalopathy, intoxication. F/u urine toxicology screen  - May require dialysis, nephrology team following     CARDIOVASCULAR   # HYPOTENSION   - 2/2 sepsis   - Pt hypotensive on admission s/p 3.5L NS and given 500cc bolus on the floor. Continues to have elevated Lactate.  - Maintain MAP >65 for renal perfusion  - CHF with severely reduced EF 10%, caution with fluids  - May have some component of hypovolemia as indicated by bedside echo IVC assessment, will cautiously provide fluids and monitor respiratory status closely     # CHF exacerbation- Hx of CHF with EF 10-15% per pt 2/2 ischemic cardiomayopathy. s/p AICD as indicated by CXR   - Elevated BNP on admission with R sided effusion and edema  - Give fluids judiciously given low EF in setting of likely sepsis  - Unclear medical regimen opt. Per [facility] pharmacy ( and [facility]) pt has not picked up Torsemide 20 or Metoprolol 100 since November.   - f/u TTE  - Hold ACE/Metoprolol in setting of sepsis    #AFIB?- Unclear if hx of Afib. Per pharmacy pt on coumadin. Pt currently in Sinus rhythm.   - F/u PT/PTT/INR, currently therapeutic     #CAD- Hx of MI s/p AICD vs PPI- Per pharmacy pt has not picked up Plavix since November. Confirm with opt cardiologist   -     # LE Edema   - LE edema with chronic venous stasis.   - WIll r/o DVT. f/u Duplex    #RESPIRATORY   - pt with R loculated plural effusion noted to have increased work of breathing. Likely 2/2 fluid overload, low suspicion for pneumonia   - Starting on High flow NC @40% given increased work of breathing.   - Monitor resp status   - WIll discuss thoracocentesis with Pulmonary team     #RENAL   #ARF-  -Unknown baseline Cr presenting with Cr 3.93 with metabolic derangements.   - Likely 2/2 Sepsis, low intravascular volume and CHF  -Check UA, Urine Lytes. Trend BUN and Cr.  - Likely 2/2 severe sepsis  - s/p 4L fluids. f/u repeat BMP   - f/u CT ABd pelvis to r/o obstruction     #Hyperkalemia   - PT with elevated K to 5.7, no EKG changes   -Trend K   - Can given Kayexalate if persists     #Metabolic acidosis   - 2/2 Lactate, starvation ketoacidosis and uremia,   - s/p resuscitation Lactate trending down. Trend to lactate <2     #ID   - Severe sepsis 2/2 cellulitis. Less likely Cholecystitis.    - Given recent hospitalization. Will continue with Vanc and Zosyn (renally dosed)  - R lung Effusion likely from overload, low suspicion for infection   - Patient consented for HIV testing    #GI   - pt wit Elevated Bilrirubin and Alk phos. Abd exam benign. WIll r/o cholecystitis.   - Ct abd/ pelvis     #HEME  - elevated INR. Unclear etiology. Pt on coumadin but unclear adherence Vit K def also in differential.  - f/u repeat coags     #ENDOCRINE   - Elevated Glucose above 300. Anion gap elevated however likely 2/2 Uremia and Lactate. Ketones in urine likely 2/2 Starvation.   - Will start on Insulin drip at 1U/hour   - Monitor blood glucose   -- f/u A1C    F: No IVF. s/p 4L NS    E: Pt Hyperkalemic 2/2 ARF   N: Renal Diet     Drips: Injsulin     Full code   MICU INTERVAL HPI/OVERNIGHT EVENTS: Urine output decreased to 15 per hour, patient received 2x 500cc NS bolus, started levophed for hypotension and dobutamine started (since discontinued); hypoglycemic to 60s and received 2 amps D50    SUBJECTIVE: Patient seen and examined at bedside. No acute complaints. Denies nausea, emesis, chest pain, palpitations, shortness of breath, abdominal pain    OBJECTIVE:    VITAL SIGNS:  ICU Vital Signs Last 24 Hrs  T(C): 37.1 (11 Mar 2018 09:01), Max: 38.9 (10 Mar 2018 13:20)  T(F): 98.8 (11 Mar 2018 09:01), Max: 102 (10 Mar 2018 13:20)  HR: 102 (11 Mar 2018 11:20) (88 - 139)  BP: 91/71 (11 Mar 2018 11:20) (72/61 - 103/70)  BP(mean): 76 (11 Mar 2018 11:20) (63 - 84)  ABP: --  ABP(mean): --  RR: 24 (11 Mar 2018 11:20) (11 - 30)  SpO2: 100% (11 Mar 2018 11:20) (68% - 100%)        03-10 @ 06:01  -  03-11 @ 07:00  --------------------------------------------------------  IN: 1418.1 mL / OUT: 255 mL / NET: 1163.1 mL    03-11 @ 07:01 - 03-11 @ 12:28  --------------------------------------------------------  IN: 270.5 mL / OUT: 30 mL / NET: 240.5 mL      CAPILLARY BLOOD GLUCOSE      POCT Blood Glucose.: 86 mg/dL (11 Mar 2018 06:55)      PHYSICAL EXAM:    General: Middle aged M in NAD, tired but arousable at time of exam  HEENT: NC/AT, PERRL  Neck: supple  Respiratory: Decreased breath sounds at bilateral bases, no wheeze or rhonchi present. No increased work of breathing at time of exam  Cardiac: Tachycardic, +S1/S2, no murmurs, rubs, or gallops  Gastrointestinal: soft, NT/ND; no rebound or guarding; +BS  Genitourinary: +Jose  Extremities: Bilateral areas of chronic venous stasis with some breakdown noted. 2+ LE edema b/l. 1+ DP pulses b/l   Musculoskeletal: NROM x4; no joint swelling, tenderness or erythema  Neurologic: AAOx3; CNII-XII grossly intact; no focal deficits. Patient tired but arouses to voice at time of exam    MEDICATIONS:  MEDICATIONS  (STANDING):  DOBUTamine Infusion 2.5 MICROgram(s)/kG/Min (6 mL/Hr) IV Continuous <Continuous>  folic acid 1 milliGRAM(s) Oral daily  heparin  Injectable 5000 Unit(s) SubCutaneous every 8 hours  multivitamin 1 Tablet(s) Oral daily  norepinephrine Infusion 0.01 MICROgram(s)/kG/Min (1.5 mL/Hr) IV Continuous <Continuous>  piperacillin/tazobactam IVPB. 2.25 Gram(s) IV Intermittent every 8 hours  thiamine 100 milliGRAM(s) Oral daily    MEDICATIONS  (PRN):  benzocaine 15 mG/menthol 3.6 mG Lozenge 1 Lozenge Oral every 4 hours PRN Sore Throat      ALLERGIES:  Allergies    No Known Allergies    Intolerances        LABS:                        9.7    30.1  )-----------( 180      ( 11 Mar 2018 05:21 )             31.4     03-11    135  |  102  |  82<H>  ----------------------------<  115<H>  4.8   |  16<L>  |  3.93<H>    Ca    8.3<L>      11 Mar 2018 05:21  Phos  3.5     03-11  Mg     2.0     03-11    TPro  6.7  /  Alb  2.1<L>  /  TBili  2.4<H>  /  DBili  x   /  AST  22  /  ALT  14  /  AlkPhos  135<H>  03-11    LIVER FUNCTIONS - ( 11 Mar 2018 05:21 )  Alb: 2.1 g/dL / Pro: 6.7 g/dL / ALK PHOS: 135 U/L / ALT: 14 U/L / AST: 22 U/L / GGT: x           PT/INR - ( 11 Mar 2018 05:21 )   PT: 26.1 sec;   INR: 2.31          PTT - ( 11 Mar 2018 05:21 )  PTT:37.9 sec  Urinalysis Basic - ( 10 Mar 2018 16:31 )    Color: Yellow / Appearance: SL Cloudy / SG: >=1.030 / pH: x  Gluc: x / Ketone: Trace mg/dL  / Bili: Moderate / Urobili: 2.0 E.U./dL   Blood: x / Protein: 30 mg/dL / Nitrite: NEGATIVE   Leuk Esterase: NEGATIVE / RBC: Many /HPF / WBC < 5 /HPF   Sq Epi: x / Non Sq Epi: 5-10 /HPF / Bacteria: Present /HPF      CARDIAC MARKERS ( 10 Mar 2018 19:09 )  x     / 0.08 ng/mL / 380 U/L / x     / 2.3 ng/mL  CARDIAC MARKERS ( 10 Mar 2018 15:21 )  x     / 0.08 ng/mL / 283 U/L / x     / 1.7 ng/mL        RADIOLOGY & ADDITIONAL TESTS: Reviewed.    ASSESSMENT: 63M PMH HFrEF 10-15% (ischemic), MI, s/p AICD vs PPM, possible Afib, HTN, DM2 on insulin, possible CKD, and gout, who presents with a chief complaint of generalized weakness admitted for severe sepsis 2/2 LE cellulitis vs pneumonia     NEURO  #Toxic metabolic encephalopathy  - Appears weak and responds slowly but AOx3. Likely 2/2 severe sepsis. wIll continue monitoring mental status   - Other possible etiologies include uremic encephalopathy, intoxication. F/u urine toxicology screen  - May require dialysis, nephrology team following     CARDIOVASCULAR   # HYPOTENSION   - 2/2 sepsis   - Pt hypotensive on admission s/p 3.5L NS and given 500cc bolus on the floor. Continues to have elevated Lactate.  - Maintain MAP >65 for renal perfusion  - CHF with severely reduced EF 10%, caution with fluids  - May have some component of hypovolemia as indicated by bedside echo IVC assessment, will cautiously provide fluids and monitor respiratory status closely     # CHF exacerbation- Hx of CHF with EF 10-15% per pt 2/2 ischemic cardiomayopathy. s/p AICD as indicated by CXR   - Elevated BNP on admission with R sided effusion and edema  - Give fluids judiciously given low EF in setting of likely sepsis  - Unclear medical regimen opt. Per [facility] pharmacy ( and [place]) pt has not picked up Torsemide 20 or Metoprolol 100 since November.   - f/u TTE  - Hold ACE/Metoprolol in setting of sepsis    #AFIB?- Unclear if hx of Afib. Per pharmacy pt on coumadin. Pt currently in Sinus rhythm.   - F/u PT/PTT/INR, currently therapeutic     #CAD- Hx of MI s/p AICD vs PPI- Per pharmacy pt has not picked up Plavix since November. Confirm with opt cardiologist   -     # LE Edema   - LE edema with chronic venous stasis.   - WIll r/o DVT. f/u Duplex    #RESPIRATORY   - pt with R loculated plural effusion noted to have increased work of breathing. Likely 2/2 fluid overload, low suspicion for pneumonia   - Starting on High flow NC @40% given increased work of breathing.   - Monitor resp status   - WIll discuss thoracocentesis with Pulmonary team     #RENAL   #ARF-  -Unknown baseline Cr presenting with Cr 3.93 with metabolic derangements.   - Likely 2/2 Sepsis, low intravascular volume and CHF  -Check UA, Urine Lytes. Trend BUN and Cr.  - Likely 2/2 severe sepsis  - s/p 4L fluids. f/u repeat BMP   - f/u CT ABd pelvis to r/o obstruction     #Hyperkalemia   - PT with elevated K to 5.7, no EKG changes   -Trend K   - Can given Kayexalate if persists     #Metabolic acidosis   - 2/2 Lactate, starvation ketoacidosis and uremia,   - s/p resuscitation Lactate trending down. Trend to lactate <2     #ID   - Severe sepsis 2/2 cellulitis. Less likely Cholecystitis.    - Given recent hospitalization. Will continue with Vanc and Zosyn (renally dosed)  - R lung Effusion likely from overload, low suspicion for infection   - Patient consented for HIV testing    #GI   - pt wit Elevated Bilrirubin and Alk phos. Abd exam benign. WIll r/o cholecystitis.   - Ct abd/ pelvis     #HEME  - elevated INR. Unclear etiology. Pt on coumadin but unclear adherence Vit K def also in differential.  - f/u repeat coags     #ENDOCRINE   - Elevated Glucose above 300. Anion gap elevated however likely 2/2 Uremia and Lactate. Ketones in urine likely 2/2 Starvation.   - Will start on Insulin drip at 1U/hour   - Monitor blood glucose   -- f/u A1C    F: No IVF. s/p 4L NS    E: Pt Hyperkalemic 2/2 ARF   N: Renal Diet     Drips: Injsulin     Full code   MICU INTERVAL HPI/OVERNIGHT EVENTS: Urine output decreased to 15 per hour, patient received 2x 500cc NS bolus, started levophed for hypotension and dobutamine started (since discontinued); hypoglycemic to 60s and received 2 amps D50    SUBJECTIVE: Patient seen and examined at bedside. No acute complaints. Denies nausea, emesis, chest pain, palpitations, shortness of breath, abdominal pain    OBJECTIVE:    VITAL SIGNS:  ICU Vital Signs Last 24 Hrs  T(C): 37.1 (11 Mar 2018 09:01), Max: 38.9 (10 Mar 2018 13:20)  T(F): 98.8 (11 Mar 2018 09:01), Max: 102 (10 Mar 2018 13:20)  HR: 102 (11 Mar 2018 11:20) (88 - 139)  BP: 91/71 (11 Mar 2018 11:20) (72/61 - 103/70)  BP(mean): 76 (11 Mar 2018 11:20) (63 - 84)  ABP: --  ABP(mean): --  RR: 24 (11 Mar 2018 11:20) (11 - 30)  SpO2: 100% (11 Mar 2018 11:20) (68% - 100%)        03-10 @ 06:01  -  03-11 @ 07:00  --------------------------------------------------------  IN: 1418.1 mL / OUT: 255 mL / NET: 1163.1 mL    03-11 @ 07:01 - 03-11 @ 12:28  --------------------------------------------------------  IN: 270.5 mL / OUT: 30 mL / NET: 240.5 mL      CAPILLARY BLOOD GLUCOSE      POCT Blood Glucose.: 86 mg/dL (11 Mar 2018 06:55)      PHYSICAL EXAM:    General: Middle aged M in NAD, tired but arousable at time of exam  HEENT: NC/AT, PERRL  Neck: supple  Respiratory: Decreased breath sounds at bilateral bases, no wheeze or rhonchi present. No increased work of breathing at time of exam  Cardiac: Tachycardic, +S1/S2, no murmurs, rubs, or gallops  Gastrointestinal: soft, NT/ND; no rebound or guarding; +BS  Genitourinary: +Jose  Extremities: Bilateral areas of chronic venous stasis with some breakdown noted. 2+ LE edema b/l. 1+ DP pulses b/l   Musculoskeletal: NROM x4; no joint swelling, tenderness or erythema  Neurologic: AAOx3; CNII-XII grossly intact; no focal deficits. Patient tired but arouses to voice at time of exam    MEDICATIONS:  MEDICATIONS  (STANDING):  DOBUTamine Infusion 2.5 MICROgram(s)/kG/Min (6 mL/Hr) IV Continuous <Continuous>  folic acid 1 milliGRAM(s) Oral daily  heparin  Injectable 5000 Unit(s) SubCutaneous every 8 hours  multivitamin 1 Tablet(s) Oral daily  norepinephrine Infusion 0.01 MICROgram(s)/kG/Min (1.5 mL/Hr) IV Continuous <Continuous>  piperacillin/tazobactam IVPB. 2.25 Gram(s) IV Intermittent every 8 hours  thiamine 100 milliGRAM(s) Oral daily    MEDICATIONS  (PRN):  benzocaine 15 mG/menthol 3.6 mG Lozenge 1 Lozenge Oral every 4 hours PRN Sore Throat      ALLERGIES:  Allergies    No Known Allergies    Intolerances        LABS:                        9.7    30.1  )-----------( 180      ( 11 Mar 2018 05:21 )             31.4     03-11    135  |  102  |  82<H>  ----------------------------<  115<H>  4.8   |  16<L>  |  3.93<H>    Ca    8.3<L>      11 Mar 2018 05:21  Phos  3.5     03-11  Mg     2.0     03-11    TPro  6.7  /  Alb  2.1<L>  /  TBili  2.4<H>  /  DBili  x   /  AST  22  /  ALT  14  /  AlkPhos  135<H>  03-11    LIVER FUNCTIONS - ( 11 Mar 2018 05:21 )  Alb: 2.1 g/dL / Pro: 6.7 g/dL / ALK PHOS: 135 U/L / ALT: 14 U/L / AST: 22 U/L / GGT: x           PT/INR - ( 11 Mar 2018 05:21 )   PT: 26.1 sec;   INR: 2.31          PTT - ( 11 Mar 2018 05:21 )  PTT:37.9 sec  Urinalysis Basic - ( 10 Mar 2018 16:31 )    Color: Yellow / Appearance: SL Cloudy / SG: >=1.030 / pH: x  Gluc: x / Ketone: Trace mg/dL  / Bili: Moderate / Urobili: 2.0 E.U./dL   Blood: x / Protein: 30 mg/dL / Nitrite: NEGATIVE   Leuk Esterase: NEGATIVE / RBC: Many /HPF / WBC < 5 /HPF   Sq Epi: x / Non Sq Epi: 5-10 /HPF / Bacteria: Present /HPF      CARDIAC MARKERS ( 10 Mar 2018 19:09 )  x     / 0.08 ng/mL / 380 U/L / x     / 2.3 ng/mL  CARDIAC MARKERS ( 10 Mar 2018 15:21 )  x     / 0.08 ng/mL / 283 U/L / x     / 1.7 ng/mL        RADIOLOGY & ADDITIONAL TESTS: Reviewed.    ASSESSMENT: 63M PMH HFrEF 10-15% (ischemic), MI, s/p AICD vs PPM, possible Afib, HTN, DM2 on insulin, possible CKD, and gout, who presents with a chief complaint of generalized weakness admitted for severe sepsis 2/2 LE cellulitis vs pneumonia     NEURO  #Toxic metabolic encephalopathy  - Appears weak and responds slowly but AOx3. Likely 2/2 severe sepsis. wIll continue monitoring mental status   - Other possible etiologies include uremic encephalopathy, intoxication. F/u urine toxicology screen  - May require dialysis, nephrology team following     CARDIOVASCULAR   # HYPOTENSION   - 2/2 sepsis   - Pt hypotensive on admission s/p 3.5L NS and given 500cc bolus on the floor. Continues to have elevated Lactate.  - Maintain MAP >65 for renal perfusion  - CHF with severely reduced EF 10%, caution with fluids  - May have some component of hypovolemia as indicated by bedside echo IVC assessment, will cautiously provide fluids and monitor respiratory status closely     # CHF exacerbation- Hx of CHF with EF 10-15% per pt 2/2 ischemic cardiomayopathy. s/p AICD as indicated by CXR   - Elevated BNP on admission with R sided effusion and edema  - Give fluids judiciously given low EF in setting of likely sepsis  - Unclear medical regimen opt. Per [facility] ( and [facility]) pt has not picked up Torsemide 20 or Metoprolol 100 since November.   - f/u TTE  - Hold ACE/Metoprolol in setting of sepsis    #AFIB  - Unclear if hx of Afib. Per pharmacy pt on coumadin. Pt currently in Sinus rhythm.   - F/u PT/PTT/INR, currently therapeutic     #CAD- Hx of MI s/p AICD vs PPI- Per pharmacy pt has not picked up Plavix since November  - Obtain collateral from outpatient cardiologist     # LE Edema   - LE edema with chronic venous stasis.   - WIll r/o DVT. f/u Duplex    RESPIRATORY   - pt with R loculated plural effusion noted to have increased work of breathing. Likely 2/2 fluid overload, low suspicion for pneumonia   - Starting on High flow NC @40% given increased work of breathing.   - Monitor resp status      #RENAL   #GILMER  -Unknown baseline Cr presenting with Cr 3.93 with metabolic derangements.   - Likely 2/2 Sepsis, low intravascular volume and CHF  - Trend BUN and Cr.  - Likely 2/2 severe sepsis  - Renal team on board, patient may require dialysis   - CT abdomen and pelvis without acute obstruction  - F/u retroperitoneal ultrasound    #Hyperkalemia   - PT with elevated K to 5.7, no EKG changes   - Continue telemetry monitoring  - Trend K   - Can given Kayexalate if persists     #Metabolic acidosis   - 2/2 Lactate, starvation ketoacidosis and uremia   - Lactate downtrending, monitor until normalizes     #ID   - Severe sepsis likely due to L LE cellulitis. Less likely Cholecystitis.    - Given recent hospitalization, c/w Vanc + Zosyn (renally dosed) 3/11 day 2  - R lung Effusion possibly from overload, less likely from infection   - Consented for HIV testing    #GI   - pt wit Elevated Bilrirubin and Alk phos. Abd exam benign  - CT abdomen/pelvis which was negative for no acute pathology  - Continue to follow, r/o cholecystitis    #HEME  - elevated INR. Unclear etiology. Pt on coumadin but unclear adherence Vit K def also in differential  - Continue to trend coags     #ENDOCRINE   - Elevated Glucose above 300. Anion gap elevated however likely 2/2 Uremia and Lactate. Ketones in urine likely 2/2 Starvation.   - S/p insulin drip  - Monitor blood glucose   - f/u A1C    F: No IVF   E: Replete K<4 Mg<2, caution in setting of acute renal failure  N: Renal Diet       Full code   Dispo: MICU  Ppx: [facility]

## 2018-03-11 NOTE — DIETITIAN INITIAL EVALUATION ADULT. - NS AS NUTRI INTERV MEALS SNACK
Consider consistent CHO (with snack) without renal restrictions, unless pt to receive HD permanently.

## 2018-03-11 NOTE — CONSULT NOTE ADULT - PROBLEM SELECTOR RECOMMENDATION 3
Acute on chronic systolic CHF exacerbation likely due to poor compliance and medications  Plan:  Daily weight  Strict I/o  Fluid restriction to <1L/Day  Consider Cardiology evaluation  Obtain Echocardiogram to assess LVEF/WMA. Acute on chronic systolic CHF exacerbation likely due to poor compliance and medications  Plan:  Daily weight  Strict I/o  Fluid restriction to <1L/Day  Consider Cardiology evaluation for cardiac inotropic support for severe systolic CHF.  Obtain Echocardiogram to assess LVEF/WMA.

## 2018-03-11 NOTE — DIETITIAN INITIAL EVALUATION ADULT. - ENERGY NEEDS
80 kg ABW; 89 kg IBW; BMI 22; 6'3'' per pt; 90% of IBW.  ABW used due to pt between % of IBW.   needs 2* critical illness.

## 2018-03-11 NOTE — PHYSICAL THERAPY INITIAL EVALUATION ADULT - PERTINENT HX OF CURRENT PROBLEM, REHAB EVAL
62 yo male found to be febrile by Visiting RN, BIBA presents with sepsis due to LE cellulitis vs PNA, seen for PT eval hospital day # 2

## 2018-03-12 LAB
ALBUMIN FLD-MCNC: 0.7 G/DL — SIGNIFICANT CHANGE UP
ALBUMIN SERPL ELPH-MCNC: 2.6 G/DL — LOW (ref 3.3–5)
ALP SERPL-CCNC: 174 U/L — HIGH (ref 40–120)
ALT FLD-CCNC: 16 U/L — SIGNIFICANT CHANGE UP (ref 10–45)
ANION GAP SERPL CALC-SCNC: 16 MMOL/L — SIGNIFICANT CHANGE UP (ref 5–17)
ANION GAP SERPL CALC-SCNC: 19 MMOL/L — HIGH (ref 5–17)
ANION GAP SERPL CALC-SCNC: 19 MMOL/L — HIGH (ref 5–17)
APTT BLD: 40.2 SEC — HIGH (ref 27.5–37.4)
AST SERPL-CCNC: 21 U/L — SIGNIFICANT CHANGE UP (ref 10–40)
BILIRUB SERPL-MCNC: 3.4 MG/DL — HIGH (ref 0.2–1.2)
BLD GP AB SCN SERPL QL: NEGATIVE — SIGNIFICANT CHANGE UP
BLD GP AB SCN SERPL QL: NEGATIVE — SIGNIFICANT CHANGE UP
BUN SERPL-MCNC: 82 MG/DL — HIGH (ref 7–23)
BUN SERPL-MCNC: 89 MG/DL — HIGH (ref 7–23)
BUN SERPL-MCNC: 89 MG/DL — HIGH (ref 7–23)
CALCIUM SERPL-MCNC: 8.3 MG/DL — LOW (ref 8.4–10.5)
CALCIUM SERPL-MCNC: 8.4 MG/DL — SIGNIFICANT CHANGE UP (ref 8.4–10.5)
CALCIUM SERPL-MCNC: 8.4 MG/DL — SIGNIFICANT CHANGE UP (ref 8.4–10.5)
CHLORIDE SERPL-SCNC: 101 MMOL/L — SIGNIFICANT CHANGE UP (ref 96–108)
CHLORIDE SERPL-SCNC: 101 MMOL/L — SIGNIFICANT CHANGE UP (ref 96–108)
CHLORIDE SERPL-SCNC: 102 MMOL/L — SIGNIFICANT CHANGE UP (ref 96–108)
CO2 SERPL-SCNC: 17 MMOL/L — LOW (ref 22–31)
CO2 SERPL-SCNC: 17 MMOL/L — LOW (ref 22–31)
CO2 SERPL-SCNC: 19 MMOL/L — LOW (ref 22–31)
CREAT SERPL-MCNC: 3.89 MG/DL — HIGH (ref 0.5–1.3)
CREAT SERPL-MCNC: 3.9 MG/DL — HIGH (ref 0.5–1.3)
CREAT SERPL-MCNC: 3.95 MG/DL — HIGH (ref 0.5–1.3)
FERRITIN SERPL-MCNC: 1377 NG/ML — HIGH (ref 30–400)
GAS PNL BLDV: SIGNIFICANT CHANGE UP
GLUCOSE BLDC GLUCOMTR-MCNC: 193 MG/DL — HIGH (ref 70–99)
GLUCOSE BLDC GLUCOMTR-MCNC: 206 MG/DL — HIGH (ref 70–99)
GLUCOSE BLDC GLUCOMTR-MCNC: 223 MG/DL — HIGH (ref 70–99)
GLUCOSE BLDC GLUCOMTR-MCNC: 245 MG/DL — HIGH (ref 70–99)
GLUCOSE FLD-MCNC: 234 MG/DL — SIGNIFICANT CHANGE UP
GLUCOSE SERPL-MCNC: 176 MG/DL — HIGH (ref 70–99)
GLUCOSE SERPL-MCNC: 201 MG/DL — HIGH (ref 70–99)
GLUCOSE SERPL-MCNC: 241 MG/DL — HIGH (ref 70–99)
GRAM STN FLD: SIGNIFICANT CHANGE UP
HCT VFR BLD CALC: 28.3 % — LOW (ref 39–50)
HGB BLD-MCNC: 9.1 G/DL — LOW (ref 13–17)
INR BLD: 2.42 — HIGH (ref 0.88–1.16)
IRON SATN MFR SERPL: 34 % — SIGNIFICANT CHANGE UP (ref 16–55)
IRON SATN MFR SERPL: 43 UG/DL — LOW (ref 45–165)
LACTATE SERPL-SCNC: 2.4 MMOL/L — HIGH (ref 0.5–2)
LACTATE SERPL-SCNC: 4.4 MMOL/L — CRITICAL HIGH (ref 0.5–2)
LDH SERPL L TO P-CCNC: 83 U/L — SIGNIFICANT CHANGE UP
LYMPHOCYTES # BLD AUTO: 2 % — LOW (ref 13–44)
MAGNESIUM SERPL-MCNC: 1.8 MG/DL — SIGNIFICANT CHANGE UP (ref 1.6–2.6)
MCHC RBC-ENTMCNC: 28.8 PG — SIGNIFICANT CHANGE UP (ref 27–34)
MCHC RBC-ENTMCNC: 32.2 G/DL — SIGNIFICANT CHANGE UP (ref 32–36)
MCV RBC AUTO: 89.6 FL — SIGNIFICANT CHANGE UP (ref 80–100)
MONOCYTES NFR BLD AUTO: 3 % — SIGNIFICANT CHANGE UP (ref 2–14)
NEUTROPHILS NFR BLD AUTO: 82 % — HIGH (ref 43–77)
PH FLD: >7.81 — SIGNIFICANT CHANGE UP
PHOSPHATE SERPL-MCNC: 3.6 MG/DL — SIGNIFICANT CHANGE UP (ref 2.5–4.5)
PHOSPHATE SERPL-MCNC: 3.8 MG/DL — SIGNIFICANT CHANGE UP (ref 2.5–4.5)
PHOSPHATE SERPL-MCNC: 4.1 MG/DL — SIGNIFICANT CHANGE UP (ref 2.5–4.5)
PLATELET # BLD AUTO: 225 K/UL — SIGNIFICANT CHANGE UP (ref 150–400)
POTASSIUM SERPL-MCNC: 4.9 MMOL/L — SIGNIFICANT CHANGE UP (ref 3.5–5.3)
POTASSIUM SERPL-MCNC: 5.2 MMOL/L — SIGNIFICANT CHANGE UP (ref 3.5–5.3)
POTASSIUM SERPL-MCNC: 5.4 MMOL/L — HIGH (ref 3.5–5.3)
POTASSIUM SERPL-SCNC: 4.9 MMOL/L — SIGNIFICANT CHANGE UP (ref 3.5–5.3)
POTASSIUM SERPL-SCNC: 5.2 MMOL/L — SIGNIFICANT CHANGE UP (ref 3.5–5.3)
POTASSIUM SERPL-SCNC: 5.4 MMOL/L — HIGH (ref 3.5–5.3)
PROT FLD-MCNC: 1.2 G/DL — SIGNIFICANT CHANGE UP
PROT SERPL-MCNC: 6.9 G/DL — SIGNIFICANT CHANGE UP (ref 6–8.3)
PROTHROM AB SERPL-ACNC: 27.3 SEC — HIGH (ref 9.8–12.7)
RBC # BLD: 3.16 M/UL — LOW (ref 4.2–5.8)
RBC # FLD: 15.2 % — SIGNIFICANT CHANGE UP (ref 10.3–16.9)
RH IG SCN BLD-IMP: POSITIVE — SIGNIFICANT CHANGE UP
RH IG SCN BLD-IMP: POSITIVE — SIGNIFICANT CHANGE UP
SODIUM SERPL-SCNC: 136 MMOL/L — SIGNIFICANT CHANGE UP (ref 135–145)
SODIUM SERPL-SCNC: 137 MMOL/L — SIGNIFICANT CHANGE UP (ref 135–145)
SODIUM SERPL-SCNC: 138 MMOL/L — SIGNIFICANT CHANGE UP (ref 135–145)
SPECIMEN SOURCE FLD: SIGNIFICANT CHANGE UP
SPECIMEN SOURCE: SIGNIFICANT CHANGE UP
T3 SERPL-MCNC: 48 NG/DL — LOW (ref 80–200)
TIBC SERPL-MCNC: 127 UG/DL — LOW (ref 220–430)
UIBC SERPL-MCNC: 84 UG/DL — LOW (ref 110–370)
URATE SERPL-MCNC: 9.8 — SIGNIFICANT CHANGE UP
VANCOMYCIN TROUGH SERPL-MCNC: 15 UG/ML — SIGNIFICANT CHANGE UP (ref 10–20)
VANCOMYCIN TROUGH SERPL-MCNC: 24 UG/ML — HIGH (ref 10–20)
WBC # BLD: 46.1 K/UL — CRITICAL HIGH (ref 3.8–10.5)
WBC # FLD AUTO: 46.1 K/UL — CRITICAL HIGH (ref 3.8–10.5)

## 2018-03-12 PROCEDURE — 99291 CRITICAL CARE FIRST HOUR: CPT | Mod: 25

## 2018-03-12 PROCEDURE — 32551 INSERTION OF CHEST TUBE: CPT

## 2018-03-12 PROCEDURE — 76700 US EXAM ABDOM COMPLETE: CPT | Mod: 26

## 2018-03-12 PROCEDURE — 71045 X-RAY EXAM CHEST 1 VIEW: CPT | Mod: 26,77

## 2018-03-12 PROCEDURE — 73700 CT LOWER EXTREMITY W/O DYE: CPT | Mod: 26,50

## 2018-03-12 PROCEDURE — 93970 EXTREMITY STUDY: CPT | Mod: 26

## 2018-03-12 PROCEDURE — 71045 X-RAY EXAM CHEST 1 VIEW: CPT | Mod: 26

## 2018-03-12 PROCEDURE — 93306 TTE W/DOPPLER COMPLETE: CPT | Mod: 26

## 2018-03-12 RX ORDER — ALBUMIN HUMAN 25 %
50 VIAL (ML) INTRAVENOUS ONCE
Qty: 0 | Refills: 0 | Status: DISCONTINUED | OUTPATIENT
Start: 2018-03-12 | End: 2018-03-12

## 2018-03-12 RX ORDER — VANCOMYCIN HCL 1 G
1250 VIAL (EA) INTRAVENOUS ONCE
Qty: 0 | Refills: 0 | Status: COMPLETED | OUTPATIENT
Start: 2018-03-12 | End: 2018-03-12

## 2018-03-12 RX ORDER — PIPERACILLIN AND TAZOBACTAM 4; .5 G/20ML; G/20ML
2.25 INJECTION, POWDER, LYOPHILIZED, FOR SOLUTION INTRAVENOUS EVERY 6 HOURS
Qty: 0 | Refills: 0 | Status: DISCONTINUED | OUTPATIENT
Start: 2018-03-12 | End: 2018-03-14

## 2018-03-12 RX ORDER — SODIUM CHLORIDE 9 MG/ML
250 INJECTION INTRAMUSCULAR; INTRAVENOUS; SUBCUTANEOUS ONCE
Qty: 0 | Refills: 0 | Status: COMPLETED | OUTPATIENT
Start: 2018-03-12 | End: 2018-03-12

## 2018-03-12 RX ORDER — HYDROCORTISONE 20 MG
50 TABLET ORAL EVERY 6 HOURS
Qty: 0 | Refills: 0 | Status: DISCONTINUED | OUTPATIENT
Start: 2018-03-12 | End: 2018-03-18

## 2018-03-12 RX ORDER — VASOPRESSIN 20 [USP'U]/ML
0.04 INJECTION INTRAVENOUS
Qty: 100 | Refills: 0 | Status: DISCONTINUED | OUTPATIENT
Start: 2018-03-12 | End: 2018-03-15

## 2018-03-12 RX ORDER — NOREPINEPHRINE BITARTRATE/D5W 8 MG/250ML
0.01 PLASTIC BAG, INJECTION (ML) INTRAVENOUS
Qty: 16 | Refills: 0 | Status: DISCONTINUED | OUTPATIENT
Start: 2018-03-12 | End: 2018-03-13

## 2018-03-12 RX ORDER — HUMAN INSULIN 100 [IU]/ML
4 INJECTION, SUSPENSION SUBCUTANEOUS ONCE
Qty: 0 | Refills: 0 | Status: COMPLETED | OUTPATIENT
Start: 2018-03-12 | End: 2018-03-12

## 2018-03-12 RX ORDER — PHYTONADIONE (VIT K1) 5 MG
5 TABLET ORAL ONCE
Qty: 0 | Refills: 0 | Status: COMPLETED | OUTPATIENT
Start: 2018-03-12 | End: 2018-03-12

## 2018-03-12 RX ORDER — SODIUM BICARBONATE 1 MEQ/ML
650 SYRINGE (ML) INTRAVENOUS THREE TIMES A DAY
Qty: 0 | Refills: 0 | Status: DISCONTINUED | OUTPATIENT
Start: 2018-03-12 | End: 2018-03-15

## 2018-03-12 RX ORDER — INSULIN GLARGINE 100 [IU]/ML
6 INJECTION, SOLUTION SUBCUTANEOUS AT BEDTIME
Qty: 0 | Refills: 0 | Status: DISCONTINUED | OUTPATIENT
Start: 2018-03-12 | End: 2018-03-17

## 2018-03-12 RX ORDER — ALBUMIN HUMAN 25 %
250 VIAL (ML) INTRAVENOUS ONCE
Qty: 0 | Refills: 0 | Status: COMPLETED | OUTPATIENT
Start: 2018-03-12 | End: 2018-03-12

## 2018-03-12 RX ADMIN — VASOPRESSIN 2.4 UNIT(S)/MIN: 20 INJECTION INTRAVENOUS at 20:30

## 2018-03-12 RX ADMIN — Medication 101 MILLIGRAM(S): at 12:11

## 2018-03-12 RX ADMIN — Medication 100 MILLIGRAM(S): at 12:08

## 2018-03-12 RX ADMIN — Medication 0.75 MICROGRAM(S)/KG/MIN: at 12:25

## 2018-03-12 RX ADMIN — Medication 650 MILLIGRAM(S): at 22:56

## 2018-03-12 RX ADMIN — PIPERACILLIN AND TAZOBACTAM 200 GRAM(S): 4; .5 INJECTION, POWDER, LYOPHILIZED, FOR SOLUTION INTRAVENOUS at 10:00

## 2018-03-12 RX ADMIN — Medication 50 MILLIGRAM(S): at 18:13

## 2018-03-12 RX ADMIN — PIPERACILLIN AND TAZOBACTAM 200 GRAM(S): 4; .5 INJECTION, POWDER, LYOPHILIZED, FOR SOLUTION INTRAVENOUS at 04:03

## 2018-03-12 RX ADMIN — Medication 50 MILLIGRAM(S): at 12:08

## 2018-03-12 RX ADMIN — Medication 5 MILLIGRAM(S): at 03:18

## 2018-03-12 RX ADMIN — HUMAN INSULIN 4 UNIT(S): 100 INJECTION, SUSPENSION SUBCUTANEOUS at 12:09

## 2018-03-12 RX ADMIN — Medication 1 TABLET(S): at 12:08

## 2018-03-12 RX ADMIN — Medication 4: at 18:13

## 2018-03-12 RX ADMIN — Medication 4: at 12:07

## 2018-03-12 RX ADMIN — Medication 4: at 22:56

## 2018-03-12 RX ADMIN — Medication 1.5 MICROGRAM(S)/KG/MIN: at 03:21

## 2018-03-12 RX ADMIN — SODIUM CHLORIDE 750 MILLILITER(S): 9 INJECTION INTRAMUSCULAR; INTRAVENOUS; SUBCUTANEOUS at 04:02

## 2018-03-12 RX ADMIN — Medication 1 MILLIGRAM(S): at 12:12

## 2018-03-12 RX ADMIN — Medication 650 MILLIGRAM(S): at 03:09

## 2018-03-12 RX ADMIN — Medication 125 MILLILITER(S): at 00:49

## 2018-03-12 RX ADMIN — Medication 166.67 MILLIGRAM(S): at 12:12

## 2018-03-12 RX ADMIN — Medication 2: at 06:07

## 2018-03-12 RX ADMIN — INSULIN GLARGINE 6 UNIT(S): 100 INJECTION, SOLUTION SUBCUTANEOUS at 22:56

## 2018-03-12 NOTE — PROGRESS NOTE ADULT - PROBLEM SELECTOR PLAN 1
Oligoanuric GILMER likely ischemic ATN with septic/cardiogenic shock with stable renal function with creatinine pleateau around 3.9 at present with prior CKD with unknown baseline renal function for past 2 years.    Plan:  Obtain urinalysis, and urine protein/creatinine ratio to assess PCR to eval for further workup for proteinuria and hematuria  Obtain ANCA, JAY, Hepatitis profile, RPR, work up for paraproteinemia, C3, C4 level if unable to obtain urine PCR.   Avoid Nephrotoxic agents.  Obtain bilateral renal sonogram with venous doppler to asses for RVT.  Adjust antibiotics as per renal dose clearance of Cr cl<10ml/min  Patient with volume overload and right plerual effusion with mild hyperkalemia.  Patient with mild lethargy and mild tremors.   Volume status hypervolemic with positive fluid balance at present. K level 5.2 at present with mild metabolic acidosis  Patient is at high risk for cardiac dysarrythmias and sudden deterioration while being on HD treatment due to severe cardiomyopathy with cardiogenic/septic shock.   Continue inotropic support and IV pressors for now.  Obtain serum uric acid level for now.  No emergent indication for RRT at present.

## 2018-03-12 NOTE — PROGRESS NOTE ADULT - ATTENDING COMMENTS
Case followed throughout the day with Dr. Abreu.  Agree with note above.  We will continue to follow closely with you.

## 2018-03-12 NOTE — PROGRESS NOTE ADULT - PROBLEM SELECTOR PLAN 5
Anemia of chronic renal disease with hemoglobin 9.1 at present. Not iron deficient.  Ferritin: 1377 and T sat% 34.  obtain Haptoglobin/LDH to assess TMA although less likely due to normal platelet count.  Work up for paraproteinemia (SPEP, Serum immunofixation and Serum free light chain ratio) due to proteinuria and hematuria.

## 2018-03-12 NOTE — PROGRESS NOTE ADULT - SUBJECTIVE AND OBJECTIVE BOX
INTERVAL HPI/OVERNIGHT EVENTS: L IJ A line and central line placed. . Lactate persistently 2.4. 250cc NS and 250cc albumin for intermittent tachy to the 120s and hypotension to the high 80s systolic, resolved. 12 beats vtach- pt asymptomatic .       SUBJECTIVE: Patient seen and examined at bedside. Does not complain of any distress but appears drowsy and falls asleep during conversations    OBJECTIVE:    VITAL SIGNS:  ICU Vital Signs Last 24 Hrs  T(C): 36.8 (12 Mar 2018 06:48), Max: 37.3 (12 Mar 2018 01:00)  T(F): 98.3 (12 Mar 2018 06:48), Max: 99.2 (12 Mar 2018 01:00)  HR: 126 (12 Mar 2018 08:45) (98 - 126)  BP: 93/69 (12 Mar 2018 06:00) (70/50 - 103/74)  BP(mean): 75 (12 Mar 2018 06:00) (56 - 82)  ABP: 92/54 (12 Mar 2018 07:00) (64/40 - 100/68)  ABP(mean): 66 (12 Mar 2018 07:00) (48 - 78)  RR: 14 (12 Mar 2018 08:45) (0 - 27)  SpO2: 100% (12 Mar 2018 08:45) (89% - 100%)        03-11 @ 07:01  -  03-12 @ 07:00  --------------------------------------------------------  IN: 2754.5 mL / OUT: 185 mL / NET: 2569.5 mL    03-12 @ 07:01 - 03-12 @ 12:25  --------------------------------------------------------  IN: 49 mL / OUT: 10 mL / NET: 39 mL      CAPILLARY BLOOD GLUCOSE      POCT Blood Glucose.: 245 mg/dL (12 Mar 2018 11:15)      PHYSICAL EXAM:    General: Lethargic. AOX3 however closes eyes mid conversation.    HEENT: NC/AT, PERRL  Neck: supple  Respiratory: Decreased breath sounds RLL. +increased work of breathing. No neck accessory muscle use noted.   Cardiac: Tachycardic, +S1/S2, no murmurs, rubs, or gallops  Gastrointestinal: soft, NT/ND; no rebound or guarding; +BS  Genitourinary: +Jose  Extremities: Bilateral chronic venous stasis areas of skin breaks. R ankle erythema noted. Warm to touch. Diffuse 2+ LE edema b/l. 1+ DP pulses b/l   Musculoskeletal: NROM x4; no joint swelling, tenderness or erythema  Neurologic: AAOx3; Lethargic.     MEDICATIONS:  MEDICATIONS  (STANDING):  bisacodyl 5 milliGRAM(s) Oral every 12 hours  dextrose 5%. 1000 milliLiter(s) (50 mL/Hr) IV Continuous <Continuous>  dextrose 50% Injectable 12.5 Gram(s) IV Push once  dextrose 50% Injectable 25 Gram(s) IV Push once  dextrose 50% Injectable 25 Gram(s) IV Push once  folic acid 1 milliGRAM(s) Oral daily  hydrocortisone sodium succinate Injectable 50 milliGRAM(s) IV Push every 6 hours  insulin lispro (HumaLOG) corrective regimen sliding scale   SubCutaneous Before meals and at bedtime  milrinone Infusion 0.375 MICROgram(s)/kG/Min (9 mL/Hr) IV Continuous <Continuous>  multivitamin 1 Tablet(s) Oral daily  norepinephrine Infusion 0.01 MICROgram(s)/kG/Min (0.75 mL/Hr) IV Continuous <Continuous>  piperacillin/tazobactam IVPB. 2.25 Gram(s) IV Intermittent every 6 hours  thiamine 100 milliGRAM(s) Oral daily    MEDICATIONS  (PRN):  acetaminophen   Tablet. 650 milliGRAM(s) Oral every 6 hours PRN Moderate Pain (4 - 6)  benzocaine 15 mG/menthol 3.6 mG Lozenge 1 Lozenge Oral every 4 hours PRN Sore Throat  dextrose Gel 1 Dose(s) Oral once PRN Blood Glucose LESS THAN 70 milliGRAM(s)/deciliter  glucagon  Injectable 1 milliGRAM(s) IntraMuscular once PRN Glucose LESS THAN 70 milligrams/deciliter      ALLERGIES:  Allergies    No Known Allergies    Intolerances        LABS:                        9.1    46.1  )-----------( 225      ( 12 Mar 2018 04:46 )             28.3     03-12    138  |  102  |  82<H>  ----------------------------<  201<H>  5.4<H>   |  17<L>  |  3.89<H>    Ca    8.4      12 Mar 2018 04:46  Phos  3.8     03-12  Mg     1.8     03-12    TPro  6.9  /  Alb  2.6<L>  /  TBili  3.4<H>  /  DBili  x   /  AST  21  /  ALT  16  /  AlkPhos  174<H>  03-12    PT/INR - ( 12 Mar 2018 04:46 )   PT: 27.3 sec;   INR: 2.42          PTT - ( 12 Mar 2018 04:46 )  PTT:40.2 sec  Urinalysis Basic - ( 10 Mar 2018 16:31 )    Color: Yellow / Appearance: SL Cloudy / SG: >=1.030 / pH: x  Gluc: x / Ketone: Trace mg/dL  / Bili: Moderate / Urobili: 2.0 E.U./dL   Blood: x / Protein: 30 mg/dL / Nitrite: NEGATIVE   Leuk Esterase: NEGATIVE / RBC: Many /HPF / WBC < 5 /HPF   Sq Epi: x / Non Sq Epi: 5-10 /HPF / Bacteria: Present /HPF        RADIOLOGY & ADDITIONAL TESTS: Reviewed.

## 2018-03-12 NOTE — PROGRESS NOTE ADULT - ASSESSMENT
ASSESSMENT: 63M PMH HFrEF 10-15% (ischemic), MI, s/p AICD vs PPM, possible Afib, HTN, DM2 on insulin, possible CKD, and gout, who presents with a chief complaint of generalized weakness now in septic criselda likely 2/2 LE cellulitis    NEURO  #Toxic metabolic encephalopathy  - Appears weak and responds slowly but AOx3. Likely 2/2 severe sepsis. wIll continue monitoring mental status   - Other possible etiologies include uremic encephalopathy, intoxication. F/u urine toxicology screen  - May require dialysis, nephrology team following. Cr stable for now.      CARDIOVASCULAR   # HYPOTENSION   - 2/2 sepsis   - Pt persistently hypotensive this admission with high Levophed requirements. Dobutamine was stopped yesterday given tachycardia. Started on Milrinone yesterday however given increased Levophed requirements since initiation, will titrate off Milrinone and assess hemodynamics and VBG.   - Continues to have elevated Lactate. Likely decrease in clearance as well inadequate source control.   - Maintain MAP >65 for renal perfusion  - CHF with severely reduced EF 10%, caution with fluids. Per renal recs , can give IV albumin for fluids.   - May have some component of hypovolemia as indicated by bedside echo IVC assessment, will cautiously provide fluids and monitor respiratory status closely. Will obtain official TTE.      # CHF exacerbation- Hx of CHF with EF 10-15% per pt 2/2 ischemic cardiomyopathy s/p AICD as indicated by CXR   - Elevated BNP on admission with R sided effusion and edema  - Pt presently on Milrinone for Inotropic support however will stop given increased Levophed requirements. Bedside echo today shows poor LV contractility. WIll obtain official TTE.   - Will assess VBG off Midodrine.   -  keeping  Cardiogenic shock in differential   - Give fluids judiciously given low EF in setting of likely sepsis  - Unclear medical regimen opt. Per [facility] ( and [facility]) pt has not picked up Torsemide 20 or Metoprolol 100 since November.   - f/u TTE  - Hold ACE/Metoprolol in setting of sepsis    #AFIB  - Unclear if hx of Afib. Per pharmacy pt on coumadin. Pt currently in Sinus rhythm.   - F/u PT/PTT/INR, currently elevated. Plan to give FFP and Vit K today for thoracocentesis.      #CAD- Hx of MI s/p AICD vs PPI- Per pharmacy pt has not picked up Plavix since November  - Obtain collateral from outpatient cardiologist     # LE Edema   - LE edema with chronic venous stasis.   - WIll r/o DVT. f/u Duplex    RESPIRATORY   #Hypoxia   - pt with R loculated plural effusion noted to have increased work of breathing. Likely 2/2 fluid overload, low suspicion for pneumonia.   - Will perform Thoracocentesis today post Vit K and FFP.  - Starting on High flow NC @40% given increased work of breathing.   - Adding Solucortef 50 q6h today given worsening condition and recent hx of Steroid administration 2 months ago  - Plan to keep Hb 9-10. WIll discuss pRBC transfusion   - Monitor resp status.     #RENAL   #GILMER  -Unknown baseline Cr presenting with Cr 3.93 with metabolic derangements.   - Likely 2/2 Sepsis, low intravascular volume and CHF  - Trend BUN and Cr.  - Likely 2/2 severe sepsis  - Renal team on board, patient may require dialysis   - CT abdomen and pelvis without acute obstruction.   - F/u retroperitoneal ultrasound    #Hyperkalemia   - PT with elevated K to 5.7 on admission,  no EKG changes   - Continue telemetry monitoring  - Trend K   - Can given Kayexalate if persists     #Metabolic acidosis   - 2/2 Lactate, starvation ketoacidosis and uremia   - Lactate downtrending however still elevated.   - Will trend     #ID   - Severe sepsis likely due to L LE cellulitis. Less likely Cholecystitis.    - Given persistent vasopressor requirements, possible other source. Given renal function, will obtain US abd to look for other etiologies. WIll also obtain repeat LE CT to assess for interval change.   - Given recent hospitalization, c/w Vanc + Zosyn (renally dosed) 3/12 day 3  - R lung Effusion possibly from overload. Will perform Thoracocentesis post Vit K and FFP   - Consented for HIV testing  - Vascular surgery on board. Recommending Amputation as an option if pt continues to be unstable.     #GI   - pt wit Elevated Bilrirubin and Alk phos. Abd exam benign  - CT abdomen/pelvis without contrast. which was negative for no acute pathology  - WIll obtain US abd to r/o abd pathology. Bedside US show ascites.     #HEME  # elevated INR. Unclear etiology. Pt on coumadin? - Continue to trend coags  - Given Vit K and FFP this AM.    #ANemia  - Stable at 8-9. Anemia opf chronic diosease.   - WIll keep Hb around 9-10     #ENDOCRINE   - Elevated Glucose. Anion gap elevated however likely 2/2 Uremia and Lactate. Ketones in urine likely 2/2 Starvation.   - S/p insulin drip  - Currently on MISS. Given NPH 4 in AM.   - Monitor blood glucose   - A1C 8.6    F: No IVF   E: Replete K<4 Mg<2, caution in setting of acute renal failure  N: Renal Diet     Lines: L IJ (3/12). L A line (3/12), barnes   Drips: Milrinone, Levophed     Full code   Dispo: MICU  Ppx: [facility] ASSESSMENT: 63M PMH HFrEF 10-15% (ischemic), MI, s/p AICD vs PPM, possible Afib, HTN, DM2 on insulin, possible CKD, and gout, who presents with a chief complaint of generalized weakness now in septic criselda likely 2/2 LE cellulitis    NEURO  #Toxic metabolic encephalopathy  - Appears weak and responds slowly but AOx3. Likely 2/2 severe sepsis. wIll continue monitoring mental status   - Other possible etiologies include uremic encephalopathy, intoxication. F/u urine toxicology screen  - May require dialysis, nephrology team following. Cr stable for now.      CARDIOVASCULAR   # HYPOTENSION   - 2/2 sepsis   - Pt persistently hypotensive this admission with high Levophed requirements. Dobutamine was stopped yesterday given tachycardia. Started on Milrinone yesterday however given increased Levophed requirements since initiation, will titrate off Milrinone and assess hemodynamics and VBG.   - Continues to have elevated Lactate. Likely decrease in clearance as well inadequate source control.   - Maintain MAP >65 for renal perfusion  - CHF with severely reduced EF 10%, caution with fluids. Per renal recs , can give IV albumin for fluids.   - May have some component of hypovolemia as indicated by bedside echo IVC assessment, will cautiously provide fluids and monitor respiratory status closely. Will obtain official TTE.      # CHF exacerbation- Hx of CHF with EF 10-15% per pt 2/2 ischemic cardiomyopathy s/p AICD as indicated by CXR   - Elevated BNP on admission with R sided effusion and edema  - Pt presently on Milrinone for Inotropic support however will stop given increased Levophed requirements. Bedside echo today shows poor LV contractility. WIll obtain official TTE.   - Will assess VBG off Midodrine.   -  keeping  Cardiogenic shock in differential   - Give fluids judiciously given low EF in setting of likely sepsis  - Unclear medical regimen opt. [facility] ( and [facility]) pt has not picked up Torsemide 20 or Metoprolol 100 since November.   - f/u TTE  - Hold ACE/Metoprolol in setting of sepsis    #AFIB  - Unclear if hx of Afib. Per pharmacy pt on coumadin. Pt currently in Sinus rhythm.   - F/u PT/PTT/INR, currently elevated. Plan to give FFP and Vit K today for thoracocentesis.      #CAD- Hx of MI s/p AICD vs PPI- Per pharmacy pt has not picked up Plavix since November  - Obtain collateral from outpatient cardiologist     # LE Edema   - LE edema with chronic venous stasis.   - WIll r/o DVT. f/u Duplex    RESPIRATORY   #Hypoxia   - pt with R loculated plural effusion noted to have increased work of breathing. Likely 2/2 fluid overload, low suspicion for pneumonia.   - Will perform Thoracocentesis today post Vit K and FFP.  - Starting on High flow NC @40% given increased work of breathing.   - Adding Solucortef 50 q6h today given worsening condition and recent hx of Steroid administration 2 months ago  - Plan to keep Hb 9-10. WIll discuss pRBC transfusion   - Monitor resp status.     #RENAL   #GILMER  -Unknown baseline Cr presenting with Cr 3.93 with metabolic derangements.   - Likely 2/2 Sepsis, low intravascular volume and CHF  - Trend BUN and Cr.  - Likely 2/2 severe sepsis  - Renal team on board, patient may require dialysis   - CT abdomen and pelvis without acute obstruction.   - F/u retroperitoneal ultrasound    #Hyperkalemia   - PT with elevated K to 5.7 on admission,  no EKG changes   - Continue telemetry monitoring  - Trend K   - Can given Kayexalate if persists     #Metabolic acidosis   - 2/2 Lactate, starvation ketoacidosis and uremia   - Lactate downtrending however still elevated.   - Will trend     #ID   - Severe sepsis likely due to L LE cellulitis. Less likely Cholecystitis.    - Given persistent vasopressor requirements, possible other source. Given renal function, will obtain US abd to look for other etiologies. WIll also obtain repeat LE CT to assess for interval change.   - Given recent hospitalization, c/w Vanc + Zosyn (renally dosed) 3/12 day 3  - R lung Effusion possibly from overload. Will perform Thoracocentesis post Vit K and FFP   - Consented for HIV testing  - Vascular surgery on board. Recommending Amputation as an option if pt continues to be unstable.     #GI   - pt wit Elevated Bilrirubin and Alk phos. Abd exam benign  - CT abdomen/pelvis without contrast. which was negative for no acute pathology  - WIll obtain US abd to r/o abd pathology. Bedside US show ascites.     #HEME  # elevated INR. Unclear etiology. Pt on coumadin? - Continue to trend coags  - Given Vit K and FFP this AM.    #ANemia  - Stable at 8-9. Anemia opf chronic diosease.   - WIll keep Hb around 9-10     #ENDOCRINE   - Elevated Glucose. Anion gap elevated however likely 2/2 Uremia and Lactate. Ketones in urine likely 2/2 Starvation.   - S/p insulin drip  - Currently on MISS. Given NPH 4 in AM.   - Monitor blood glucose   - A1C 8.6    F: No IVF   E: Replete K<4 Mg<2, caution in setting of acute renal failure  N: Renal Diet     Lines: L IJ (3/12). R A line (3/12), barnes   Drips: Milrinone, Levophed     Full code   Dispo: MICU  Ppx: [facility]

## 2018-03-12 NOTE — PROGRESS NOTE ADULT - PROBLEM SELECTOR PLAN 2
Mild hyperkalemia with K level 5.4 at present likely due to poor renal clearance  Low K/Low phos/renal diet.  Will repeat BMP with further treatment.  If CVP remain elevated can give Lasix challenge high dose.

## 2018-03-12 NOTE — PROGRESS NOTE ADULT - SUBJECTIVE AND OBJECTIVE BOX
Patient is a 63y Male seen and evaluated at bedside. Patient lying in bed remains in critical condition with high flow oxygen at present. Blood pressure continue to remain in low 80 to 90's at present on IV levophed. IV milrinone drip discontinued due to tachycardia. CVP placed today morning. I/o 24 hour 2.9 Liter with output 200 and remains oliguric at present. BUN/Creatinie pleateau around 89/3.9 at present. Patient still remains awake and able to communicate at present with mild lethargy. Mild tremors of upper extremities at present.       acetaminophen   Tablet. 650 every 6 hours PRN  benzocaine 15 mG/menthol 3.6 mG Lozenge 1 every 4 hours PRN  bisacodyl 5 every 12 hours  dextrose 5%. 1000 <Continuous>  dextrose 50% Injectable 12.5 once  dextrose 50% Injectable 25 once  dextrose 50% Injectable 25 once  dextrose Gel 1 once PRN  folic acid 1 daily  glucagon  Injectable 1 once PRN  hydrocortisone sodium succinate Injectable 50 every 6 hours  insulin lispro (HumaLOG) corrective regimen sliding scale  Before meals and at bedtime  milrinone Infusion 0.375 <Continuous>  multivitamin 1 daily  norepinephrine Infusion 0.01 <Continuous>  piperacillin/tazobactam IVPB. 2.25 every 6 hours  thiamine 100 daily      Allergies    No Known Allergies    Intolerances        T(C): , Max: 37.3 (03-12-18 @ 01:00)  T(F): , Max: 99.2 (03-12-18 @ 01:00)  HR: 112 (03-12-18 @ 14:10)  BP: 93/69 (03-12-18 @ 06:00)  BP(mean): 75 (03-12-18 @ 06:00)  RR: 15 (03-12-18 @ 14:10)  SpO2: 98% (03-12-18 @ 14:10)  Wt(kg): --    03-11 @ 07:01  -  03-12 @ 07:00  --------------------------------------------------------  IN: 2754.5 mL / OUT: 185 mL / NET: 2569.5 mL    03-12 @ 07:01  -  03-12 @ 14:51  --------------------------------------------------------  IN: 49 mL / OUT: 10 mL / NET: 39 mL      Height (cm): 190.5 (03-11 @ 15:10)    Review of Systems:  CONSTITUTIONAL: Fatigue and tiredness, No fever or chills.  EYES: No blurred or double vision.  RESPIRATORY: Mild shortness of breath,   CARDIOVASCULAR: dyspnea and orthopnea, denies any Chest pain   GASTROINTESTINAL: No abdominal or flank pain, No nausea or vomiting, No diarrhea  GENITOURINARY: Decreased urine output  NEUROLOGICAL: Lethargy, fatigue, tiredness, Mild tremors, no headaches, no focal limb weakness  SKIN: No skin rashes   MUSCULOSKELETAL: Bilateral leg edema with chronic venous dermatitis      PHYSICAL EXAM:  GENERAL: Ill appearing, awake but lethargic and fatigue individual lying in bed in mild respiratory distress at present  HEAD:  Atraumatic, Normocephalic,   EYES: Bilateral conjuctival and scleral pallor+nt  Oral cavity: Oral mucosa dry and pale  NECK: Neck supple, No JVD  CHEST/LUNG: Bilateral decreased breath sounds, Right>left, Bilateral crepitations, no wheezing  HEART: Regular rate and rhythm. HOMER II/VI at LPSB, No gallop, no rub   ABDOMEN: Soft, Nontender, non distended, BS+nt, No flank tenderness.   EXTREMITIES: Bilateral leg edema++nt, Bilateral chronic venous dermatitis changes, No abscess. No clubbing, cyanosis,  Neurology: AAOx2-3, no focal limb weakness, sensory intact.  SKIN: No rashes or lesions          ACCESS:     LABS:                        9.1    46.1  )-----------( 225      ( 12 Mar 2018 04:46 )             28.3     03-12    137  |  101  |  89<H>  ----------------------------<  241<H>  5.2   |  17<L>  |  3.90<H>    Ca    8.3<L>      12 Mar 2018 14:11  Phos  4.1     03-12  Mg     1.8     03-12    TPro  6.9  /  Alb  2.6<L>  /  TBili  3.4<H>  /  DBili  x   /  AST  21  /  ALT  16  /  AlkPhos  174<H>  03-12      PT/INR - ( 12 Mar 2018 04:46 )   PT: 27.3 sec;   INR: 2.42          PTT - ( 12 Mar 2018 04:46 )  PTT:40.2 sec  Urinalysis Basic - ( 10 Mar 2018 16:31 )    Color: Yellow / Appearance: SL Cloudy / SG: >=1.030 / pH: x  Gluc: x / Ketone: Trace mg/dL  / Bili: Moderate / Urobili: 2.0 E.U./dL   Blood: x / Protein: 30 mg/dL / Nitrite: NEGATIVE   Leuk Esterase: NEGATIVE / RBC: Many /HPF / WBC < 5 /HPF   Sq Epi: x / Non Sq Epi: 5-10 /HPF / Bacteria: Present /HPF      Sodium, Random Urine: <20 mmol/L (03-10 @ 20:11)  Potassium, Random Urine: 83 mmol/L (03-10 @ 20:11)  Chloride, Random Urine: <20 mmol/L (03-10 @ 20:11)  Creatinine, Random Urine: 436 mg/dL (03-10 @ 20:11)        RADIOLOGY & ADDITIONAL STUDIES:  < from: Xray Chest 1 View-PORTABLE IMMEDIATE (03.12.18 @ 02:48) >    EXAM:  XR CHEST PORTABLE IMMED 1V                          PROCEDURE DATE:  03/12/2018                     INTERPRETATION:  Portable AP Radiograph dated 3/12/2018 2:48 AM    CLINICAL INFORMATION: 63 years, Male, central line placed    PRIOR STUDIES: Chest x-ray 3/11/2018 at 6:13 AM    FINDINGS:  Lines, Catheters and Support Devices: Interval placement of a left-sided   internal jugular central line with its tip projecting over the SVC. Left   implantable AICD is again noted.    Heart Size, Mediastinum and Hilar Contours: Heart size is stable. Stable   mediastinal and hilar contours.      Lungs: Redemonstration of a moderate right-sided pleural effusion.   Possible trace left pleural effusion. No pneumothorax.     Bones/Soft Tissues: No acute abnormalities of the soft tissues and   osseous structures.     IMPRESSION:  1.  Interval placement of a left-sided central line with its tip   projecting over the SVC.   2.  Redemonstration of a moderate right-sided pleural effusion. Possible   trace left pleural effusion.              "Thank you for the opportunity to participate in the care of this   patient."    BOGDAN ZAMBRANO M.D., RADIOLOGY RESIDENT  This document has been electronically signed.  ANDRE ASHTON M.D. ATTENDING RADIOLOGIST  This document has been electronically signed. Mar 12 2018  9:49AM                  < end of copied text >

## 2018-03-12 NOTE — PROGRESS NOTE ADULT - PROBLEM SELECTOR PLAN 6
Patient's Calcium 8.3 and phosphorus 4.1 at present.  Obtain intact PTH, VItamin D 25 and Vitamin D 1,25 level for now  Low phos/renal diet.  Monitor calcium and phosphorus level for now.

## 2018-03-12 NOTE — PROGRESS NOTE ADULT - PROBLEM SELECTOR PLAN 4
Increased aniongap metabolic acidosis likely due to underlying renal disease  No indication for IV bicarbonate at present.  Can add po sodium bicarbonate 650mg po tid for now

## 2018-03-12 NOTE — PROGRESS NOTE ADULT - ASSESSMENT
62 yo M history of HFrEF 10-15% 2/2 ischemic cardiomyopathy, MI , s/p AICD vs PPM, ?Afib, Hypertension, Diabetes Mellitus Type 2 on insulin, CKD?and gout, who presents with a chief complaint of generalized weakness. Nephrology consult called for GILMER

## 2018-03-12 NOTE — PROGRESS NOTE ADULT - SUBJECTIVE AND OBJECTIVE BOX
INTERVAL HPI/ OVERNIGHT EVENTS: Pt seen and examined at bedside. Pt lying in bed comfortably at time of visit. Pt states that the pain in his legs has improved very much since he was admitted. Pt states that the pain in his right leg is almost gone, however he is still having pain in his left leg. Pt denies n/v/f/c.     piperacillin/tazobactam IVPB. 2.25  vancomycin  IVPB 1250  milrinone Infusion 0.375  norepinephrine Infusion 0.01  piperacillin/tazobactam IVPB. 2.25  vancomycin  IVPB 1250      Allergies    No Known Allergies    Intolerances        Vital Signs Last 24 Hrs  T(C): 36.8 (12 Mar 2018 06:48), Max: 37.3 (12 Mar 2018 01:00)  T(F): 98.3 (12 Mar 2018 06:48), Max: 99.2 (12 Mar 2018 01:00)  HR: 126 (12 Mar 2018 08:45) (98 - 126)  BP: 93/69 (12 Mar 2018 06:00) (70/50 - 103/74)  BP(mean): 75 (12 Mar 2018 06:00) (56 - 82)  RR: 14 (12 Mar 2018 08:45) (0 - 28)  SpO2: 100% (12 Mar 2018 08:45) (80% - 100%)  I&O's Summary    11 Mar 2018 07:01  -  12 Mar 2018 07:00  --------------------------------------------------------  IN: 2754.5 mL / OUT: 185 mL / NET: 2569.5 mL    12 Mar 2018 07:01  -  12 Mar 2018 10:23  --------------------------------------------------------  IN: 49 mL / OUT: 10 mL / NET: 39 mL        Physical Exam:  General: NAD, AAOx3  Extremities: +2 pitting edema of the leg and feet bilaterally, multiple small superficial ulcers over the shin b/l with minimal purulent drainage. Erythema of the feet > leg b/l extending proximally to the proximal aspect of the leg. No fluctuance appreciated.  Palpable fem/pop bilaterally, unable to appreciate PT bilaterally. Biphasic to monophasic DP. Tenderness on ROM of left ankle. ROM intact to toes and Right ankle.       LABS:                        9.1    46.1  )-----------( 225      ( 12 Mar 2018 04:46 )             28.3     03-12    138  |  102  |  82<H>  ----------------------------<  201<H>  5.4<H>   |  17<L>  |  3.89<H>    Ca    8.4      12 Mar 2018 04:46  Phos  3.8     03-12  Mg     1.8     03-12    TPro  6.9  /  Alb  2.6<L>  /  TBili  3.4<H>  /  DBili  x   /  AST  21  /  ALT  16  /  AlkPhos  174<H>  03-12    PT/INR - ( 12 Mar 2018 04:46 )   PT: 27.3 sec;   INR: 2.42          PTT - ( 12 Mar 2018 04:46 )  PTT:40.2 sec    Radiology and Additional Studies:  < from: CT Abdomen and Pelvis No Cont (03.10.18 @ 17:15) >  EXAM:  CT ABDOMEN AND PELVIS                          PROCEDURE DATE:  03/10/2018           INTERPRETATION:  CT of the ABDOMEN and PELVIS without intravenous   contrast dated 3/10/2018 5:15 PM    INDICATION: Right abdominal pain.    TECHNIQUE: CT of the abdomen and pelvis was without intravenous contrast   as requested.  Axial and coronal images were produced and reviewed.    PRIOR STUDIES: None.    FINDINGS: Images of the lower chest demonstrate a moderate size right   pleural effusion and a small left pleural effusion. Atelectatic changes   are seen in the lung bases. Pacemaker electrodes are seen in the right   atrium and right ventricle. Coronary stents are present.    Evaluation of the upper abdomen is limited due to lack of intravenous   contrast and artifact from the patient's elbows. No focal hepatic   abnormality is identified. Layering gallstones or vicarious excretion of   previously given contrast is seen within the gallbladder lumen. Spleen is   not abnormally enlarged. Pancreatic duct is not dilated. No definite   pancreatic mass is seen.    No adrenal nodule is seen.   There is no hydronephrosis or   nephrolithiasis.    No abdominal aortic aneurysm is seen. No significant lymphadenopathy is   seen.    There is a moderate size ascites. Evaluation of the gastrointestinal   tract is limited without oral contrast. No bowel obstruction or free air   is seen. A normal appendix is present.    Images of the pelvis demonstrate Jose catheter within the bladder lumen.   Prostate is not enlarged.    Evaluation of the osseous structures demonstrates advanced degenerative   changes at L4-L5. There is extensive anasarca.      IMPRESSION:  Limited study without contrast.    Pleural effusions, ascites and anasarca.    No bowel obstruction or free air. Normal appendix.    < end of copied text >

## 2018-03-12 NOTE — BRIEF OPERATIVE NOTE - PROCEDURE
<<-----Click on this checkbox to enter Procedure Chest tube placement  03/12/2018    Active  [id number]

## 2018-03-12 NOTE — PROGRESS NOTE ADULT - ASSESSMENT
62YO M with hx of MI, CHF EF(10-15%), DM type 2, HTN, CKD, Gout p/w septic shock and bilateral LL cellulitis, tachycardic, WBC 32, lactate 6. CT of LL did not revealed any gas/collections. Admitted to MICU for further management.  -Continue with IV Vanc/ Zosyn, as per primary team  -Initial CT b/l LE showing no gas, no abscess, recommend repeat CT to r/out new formation of gas/ abscess in setting of continued leukocytosis on broad spectrum abx   -Discussed with pt the possible need for amputation, if no clinical improvement  -NPO for possible vascular intervention  -Vascular will follow   -Plan d/w Chief and Dr. Duarte

## 2018-03-12 NOTE — PROGRESS NOTE ADULT - PROBLEM SELECTOR PLAN 3
Acute on chronic systolic CHF with LVEF 15 % and severely low blood pressure  Plan per cardiology/primary ICU team for inotropic support and IV pressors for now.  Fluid restriction to <1L/day with concentration of IV drips if possible  Daily weight  Strict I/o monitoring.  Consider Cardiology evaluation for severe cardiomyopathy with fluid overload  Consider Right pleural fluid drain to further assess exudative vs transudative.

## 2018-03-13 LAB
ALBUMIN SERPL ELPH-MCNC: 2.5 G/DL — LOW (ref 3.3–5)
ALP SERPL-CCNC: 211 U/L — HIGH (ref 40–120)
ALT FLD-CCNC: 21 U/L — SIGNIFICANT CHANGE UP (ref 10–45)
ANION GAP SERPL CALC-SCNC: 21 MMOL/L — HIGH (ref 5–17)
ANISOCYTOSIS BLD QL: SLIGHT — SIGNIFICANT CHANGE UP
APTT BLD: 38.8 SEC — HIGH (ref 27.5–37.4)
AST SERPL-CCNC: 33 U/L — SIGNIFICANT CHANGE UP (ref 10–40)
B PERT IGG+IGM PNL SER: CLEAR — SIGNIFICANT CHANGE UP
BASE EXCESS BLDMV CALC-SCNC: -10.4 MMOL/L — SIGNIFICANT CHANGE UP
BILIRUB DIRECT SERPL-MCNC: 2.4 MG/DL — HIGH (ref 0–0.2)
BILIRUB INDIRECT FLD-MCNC: 2 MG/DL — HIGH (ref 0.2–1)
BILIRUB SERPL-MCNC: 4.4 MG/DL — HIGH (ref 0.2–1.2)
BUN SERPL-MCNC: 93 MG/DL — HIGH (ref 7–23)
BURR CELLS BLD QL SMEAR: SIGNIFICANT CHANGE UP
BURR CELLS BLD QL SMEAR: SIGNIFICANT CHANGE UP
CALCIUM SERPL-MCNC: 9 MG/DL — SIGNIFICANT CHANGE UP (ref 8.4–10.5)
CHLORIDE SERPL-SCNC: 101 MMOL/L — SIGNIFICANT CHANGE UP (ref 96–108)
CHOLEST FLD-MCNC: 8 MG/DL — SIGNIFICANT CHANGE UP
CO2 SERPL-SCNC: 14 MMOL/L — LOW (ref 22–31)
COLOR FLD: YELLOW — SIGNIFICANT CHANGE UP
COMMENT - FLUIDS: SIGNIFICANT CHANGE UP
CREAT SERPL-MCNC: 4.02 MG/DL — HIGH (ref 0.5–1.3)
DACRYOCYTES BLD QL SMEAR: SLIGHT — SIGNIFICANT CHANGE UP
DOHLE BOD BLD QL SMEAR: SIGNIFICANT CHANGE UP
FLUID INTAKE SUBSTANCE CLASS: SIGNIFICANT CHANGE UP
FLUID SEGMENTED GRANULOCYTES: 20 % — SIGNIFICANT CHANGE UP
GAS PNL BLDMV: SIGNIFICANT CHANGE UP
GAS PNL BLDV: SIGNIFICANT CHANGE UP
GIANT PLATELETS BLD QL SMEAR: PRESENT — SIGNIFICANT CHANGE UP
GLUCOSE BLDC GLUCOMTR-MCNC: 139 MG/DL — HIGH (ref 70–99)
GLUCOSE BLDC GLUCOMTR-MCNC: 148 MG/DL — HIGH (ref 70–99)
GLUCOSE BLDC GLUCOMTR-MCNC: 175 MG/DL — HIGH (ref 70–99)
GLUCOSE SERPL-MCNC: 171 MG/DL — HIGH (ref 70–99)
HCO3 BLDMV-SCNC: 15 MMOL/L — SIGNIFICANT CHANGE UP
HCT VFR BLD CALC: 28.6 % — LOW (ref 39–50)
HGB BLD-MCNC: 9.3 G/DL — LOW (ref 13–17)
HIV 1+2 AB+HIV1 P24 AG SERPL QL IA: SIGNIFICANT CHANGE UP
INR BLD: 2.56 — HIGH (ref 0.88–1.16)
LACTATE SERPL-SCNC: 4.8 MMOL/L — CRITICAL HIGH (ref 0.5–2)
LACTATE SERPL-SCNC: 4.8 MMOL/L — CRITICAL HIGH (ref 0.5–2)
LDH SERPL L TO P-CCNC: 512 U/L — HIGH (ref 50–242)
LG PLATELETS BLD QL AUTO: PRESENT — SIGNIFICANT CHANGE UP
LYMPHOCYTES # BLD AUTO: 5 % — LOW (ref 13–44)
LYMPHOCYTES # FLD: 23 % — SIGNIFICANT CHANGE UP
MACROCYTES BLD QL: SLIGHT — SIGNIFICANT CHANGE UP
MAGNESIUM SERPL-MCNC: 2.1 MG/DL — SIGNIFICANT CHANGE UP (ref 1.6–2.6)
MANUAL DIF COMMENT BLD-IMP: SIGNIFICANT CHANGE UP
MANUAL SMEAR VERIFICATION: SIGNIFICANT CHANGE UP
MCHC RBC-ENTMCNC: 28.6 PG — SIGNIFICANT CHANGE UP (ref 27–34)
MCHC RBC-ENTMCNC: 32.5 G/DL — SIGNIFICANT CHANGE UP (ref 32–36)
MCV RBC AUTO: 88 FL — SIGNIFICANT CHANGE UP (ref 80–100)
MICROCYTES BLD QL: SLIGHT — SIGNIFICANT CHANGE UP
MONOCYTES NFR BLD AUTO: 1 % — LOW (ref 2–14)
MONOS+MACROS # FLD: 1 % — SIGNIFICANT CHANGE UP
NEUTROPHILS NFR BLD AUTO: 65 % — SIGNIFICANT CHANGE UP (ref 43–77)
NEUTS BAND # BLD: 28 % — HIGH
NIGHT BLUE STAIN TISS: SIGNIFICANT CHANGE UP
O2 CT VFR BLD CALC: 31 MMHG — SIGNIFICANT CHANGE UP (ref 30–65)
OVALOCYTES BLD QL SMEAR: SLIGHT — SIGNIFICANT CHANGE UP
PCO2 BLDMV: 30 MMHG — SIGNIFICANT CHANGE UP (ref 30–65)
PH BLDMV: 7.31 — SIGNIFICANT CHANGE UP (ref 7.2–7.45)
PHOSPHATE SERPL-MCNC: 4.6 MG/DL — HIGH (ref 2.5–4.5)
PLAT MORPH BLD: (no result)
PLATELET # BLD AUTO: 259 K/UL — SIGNIFICANT CHANGE UP (ref 150–400)
PLATELET CLUMP BLD QL SMEAR: PRESENT
POIKILOCYTOSIS BLD QL AUTO: SLIGHT — SIGNIFICANT CHANGE UP
POLYCHROMASIA BLD QL SMEAR: SLIGHT — SIGNIFICANT CHANGE UP
POTASSIUM SERPL-MCNC: 5.5 MMOL/L — HIGH (ref 3.5–5.3)
POTASSIUM SERPL-SCNC: 5.5 MMOL/L — HIGH (ref 3.5–5.3)
PROT SERPL-MCNC: 7.3 G/DL — SIGNIFICANT CHANGE UP (ref 6–8.3)
PROTHROM AB SERPL-ACNC: 29 SEC — HIGH (ref 9.8–12.7)
RBC # BLD: 3.25 M/UL — LOW (ref 4.2–5.8)
RBC # FLD: 15.3 % — SIGNIFICANT CHANGE UP (ref 10.3–16.9)
RBC BLD AUTO: (no result)
RCV VOL RI: 308 /UL — HIGH (ref 0–5)
SAO2 % BLDMV: 47 % — SIGNIFICANT CHANGE UP
SCHISTOCYTES BLD QL AUTO: SIGNIFICANT CHANGE UP
SMUDGE CELLS # BLD: SIGNIFICANT CHANGE UP
SODIUM SERPL-SCNC: 136 MMOL/L — SIGNIFICANT CHANGE UP (ref 135–145)
SPECIMEN SOURCE FLD: SIGNIFICANT CHANGE UP
SPECIMEN SOURCE FLD: SIGNIFICANT CHANGE UP
SPECIMEN SOURCE: SIGNIFICANT CHANGE UP
STOMATOCYTES BLD QL SMEAR: SLIGHT — SIGNIFICANT CHANGE UP
TOTAL NUCLEATED CELL COUNT, BODY FLUID: 44 /UL — HIGH (ref 0–5)
TOXIC GRANULES BLD QL SMEAR: SLIGHT — SIGNIFICANT CHANGE UP
TUBE TYPE: SIGNIFICANT CHANGE UP
VANCOMYCIN FLD-MCNC: 20 UG/ML — SIGNIFICANT CHANGE UP
WBC # BLD: 44.8 K/UL — CRITICAL HIGH (ref 3.8–10.5)
WBC # FLD AUTO: 44.8 K/UL — CRITICAL HIGH (ref 3.8–10.5)

## 2018-03-13 PROCEDURE — 71045 X-RAY EXAM CHEST 1 VIEW: CPT | Mod: 26

## 2018-03-13 PROCEDURE — 99233 SBSQ HOSP IP/OBS HIGH 50: CPT | Mod: GC

## 2018-03-13 RX ORDER — SODIUM CHLORIDE 9 MG/ML
250 INJECTION INTRAMUSCULAR; INTRAVENOUS; SUBCUTANEOUS ONCE
Qty: 0 | Refills: 0 | Status: DISCONTINUED | OUTPATIENT
Start: 2018-03-13 | End: 2018-03-13

## 2018-03-13 RX ORDER — NOREPINEPHRINE BITARTRATE/D5W 8 MG/250ML
0.01 PLASTIC BAG, INJECTION (ML) INTRAVENOUS
Qty: 16 | Refills: 0 | Status: DISCONTINUED | OUTPATIENT
Start: 2018-03-13 | End: 2018-03-16

## 2018-03-13 RX ORDER — NOREPINEPHRINE BITARTRATE/D5W 8 MG/250ML
0.01 PLASTIC BAG, INJECTION (ML) INTRAVENOUS
Qty: 16 | Refills: 0 | Status: DISCONTINUED | OUTPATIENT
Start: 2018-03-13 | End: 2018-03-13

## 2018-03-13 RX ADMIN — Medication 50 MILLIGRAM(S): at 18:07

## 2018-03-13 RX ADMIN — PIPERACILLIN AND TAZOBACTAM 200 GRAM(S): 4; .5 INJECTION, POWDER, LYOPHILIZED, FOR SOLUTION INTRAVENOUS at 06:37

## 2018-03-13 RX ADMIN — PIPERACILLIN AND TAZOBACTAM 200 GRAM(S): 4; .5 INJECTION, POWDER, LYOPHILIZED, FOR SOLUTION INTRAVENOUS at 12:44

## 2018-03-13 RX ADMIN — PIPERACILLIN AND TAZOBACTAM 200 GRAM(S): 4; .5 INJECTION, POWDER, LYOPHILIZED, FOR SOLUTION INTRAVENOUS at 00:13

## 2018-03-13 RX ADMIN — Medication 1 TABLET(S): at 12:45

## 2018-03-13 RX ADMIN — Medication 50 MILLIGRAM(S): at 12:44

## 2018-03-13 RX ADMIN — Medication 2: at 11:18

## 2018-03-13 RX ADMIN — Medication 50 MILLIGRAM(S): at 00:13

## 2018-03-13 RX ADMIN — Medication 650 MILLIGRAM(S): at 06:37

## 2018-03-13 RX ADMIN — INSULIN GLARGINE 6 UNIT(S): 100 INJECTION, SOLUTION SUBCUTANEOUS at 22:47

## 2018-03-13 RX ADMIN — Medication 50 MILLIGRAM(S): at 06:37

## 2018-03-13 RX ADMIN — Medication 5 MILLIGRAM(S): at 06:37

## 2018-03-13 RX ADMIN — PIPERACILLIN AND TAZOBACTAM 200 GRAM(S): 4; .5 INJECTION, POWDER, LYOPHILIZED, FOR SOLUTION INTRAVENOUS at 18:07

## 2018-03-13 RX ADMIN — Medication 100 MILLIGRAM(S): at 12:45

## 2018-03-13 RX ADMIN — Medication 1 MILLIGRAM(S): at 12:45

## 2018-03-13 RX ADMIN — Medication 650 MILLIGRAM(S): at 22:46

## 2018-03-13 RX ADMIN — Medication 0.75 MICROGRAM(S)/KG/MIN: at 18:08

## 2018-03-13 NOTE — PROGRESS NOTE ADULT - ASSESSMENT
ASSESSMENT: 63M PMH HFrEF 10-15% (ischemic), MI, s/p AICD vs PPM, possible Afib, HTN, DM2 on insulin, possible CKD, and gout, who presents with a chief complaint of generalized weakness now in septic shock likely 2/2 LE cellulitis    NEURO  #Toxic metabolic encephalopathy  - Improvement in mentation.  AOx3. Likely 2/2 severe sepsis. wIll continue monitoring mental status   - Other possible etiologies include uremic encephalopathy, intoxication.   - Plan for dialysis today.      CARDIOVASCULAR   # HYPOTENSION   - 2/2 shock -Septic and Cardiogenic.   - Pt persistently hypotensive this admission with high Levophed requirements. Pt developed tachycardia on Inotropes and were discontinued. Presently on Levophed and Vasopressin. Will titrate down Levophed. Will discuss adding on Midodrine to the regimen.   - Continues to have elevated Lactate. Likely decrease in clearance as well inadequate source control.   - Maintain MAP >70 for renal perfusion.  - CHF with severely reduced EF 15%, caution with fluids. Per renal recs , can give IV albumin for fluids.   - May have some component of hypovolemia as indicated by bedside echo IVC assessment, will cautiously provide fluids and monitor respiratory status closely. Will obtain official TTE.      # CHF exacerbation- Hx of CHF with EF 10-15% per pt 2/2 ischemic cardiomyopathy s/p AICD as indicated by CXR   - Elevated BNP on admission with R sided effusion and edema  - Off inotropes given tacyhcardia. TTE shows EF of 15%   - Give fluids judiciously given low EF in setting of likely sepsis  - Unclear medical regimen opt. Per [facility] pharmacy ( and [place]) pt has not picked up Torsemide 20 or Metoprolol 100 since November.   - Hold ACE/Metoprolol in setting of sepsis    #AFIB  - Unclear if hx of Afib. Per pharmacy pt on coumadin. Pt currently in Sinus rhythm.   - F/u PT/PTT/INR, currently elevated. Pt s/p FFP (3/13,3/14) and Vit K (3/13) for thoracocentesis and HD catheter placement.      #CAD- Hx of MI s/p AICD vs PPI- Per pharmacy pt has not picked up Plavix since November  - Obtain collateral from outpatient cardiologist     # LE Edema   - LE edema with chronic venous stasis.   - Duplex does not show DVT    PULMONARY   #Acute resp failure   - pt with R loculated plural effusion noted to have increased work of breathing. Likely 2/2 fluid overload, FLuid studies consistent with exudative effusion.   - s/p thoracentesis on 3/3 with R chest tube placement. Overnight drainage of 2L serosanguinous fluid. WIll keep chest tube in place for 1 more day.   - Will wean off High flow NC @40%   - c/w Solucortef 50 q6h 3/12- given worsening condition and recent hx of Steroid administration 2 months ago  - Plan to keep Hb 9-10. s/p  pRBC transfusion on 3/12  - Monitor resp status.     #Pnuemothrorax  - R small pneumothorax noted on chest Xray.   - Pt HD stable. Will monitor     #RENAL   #GILMER- 2/2 ischemic ATN  -Unknown baseline Cr presenting with Cr 3.93 with metabolic derangements.   - Likely 2/2 Sepsis, low intravascular volume and CHF  - Trend BUN and Cr.  - Renal team on board, HD catheter placed on 3/13. Plan for HD session today.   - CT abdomen and pelvis without acute obstruction.   - retroperitoneal ultrasound showing medical renal disease.     #Hyperkalemia   - PT with elevated K to 5.7 on admission,  no EKG changes   - Continue telemetry monitoring  - Trend K   - Can given Kayexalate if persists   - Plan for dialysis this afternoon     #Metabolic acidosis   - 2/2 Lactate, starvation ketoacidosis and uremia   - Lactate downtrending however still elevated.   - Will trend   - Bicarb 650 TID     #ID   - septic shock likely due to L LE cellulitis.  - Pt's condition slowly improving on Vanc/zosyn.   - repeat LE CT does not show abscess or gas.   - Given recent hospitalization, c/w Vanc + Zosyn (renally dosed) 3/13 day 4  - R lung Effusion likely from overload. s/p thoracentesis with improvement in resp status   -  HIV negative.   - Vascular surgery on board. Recommending Amputation as an option if pt continues to be unstable.   - TTE with no vegetation. Low concern for AICD infection. Will monitor and consider RUKHSANA if continues to be unstable.     #GI   - pt wit Elevated Bilrirubin and Alk phos. Abd exam benign  - CT abdomen/pelvis consistent with cholelithiasis and ascites.     #HEME  # elevated INR. Unclear etiology. Pt on coumadin? - Continue to trend coags  - Given Vit K and FFP3/12. FFP 3/13     #ANemia  - Stable at 8-9. Anemia of chronic diosease.  -s/p 1pRBC on 3/12   - WIll keep Hb around 9-10     #ENDOCRINE   - Elevated Glucose. Anion gap elevated however likely 2/2 Uremia and Lactate. Ketones in urine likely 2/2 Starvation.   - S/p insulin drip  - Currently on MISS. Given NPH 4 in AM.   - Monitor blood glucose   - A1C 8.6    F: No IVF   E: Replete K<4 Mg<2, caution in setting of acute renal failure  N: Renal Diet     Lines:  R HD catheter (3/13). R chest tube (3/12), L IJ (3/12). R A line (3/12), barnes   Drips: Levophed, Vasopressin     Full code   Dispo: MICU  Ppx: [facility] ASSESSMENT: 63M PMH HFrEF 10-15% (ischemic), MI, s/p AICD vs PPM, possible Afib, HTN, DM2 on insulin, possible CKD, and gout, who presents with a chief complaint of generalized weakness now in septic shock likely 2/2 LE cellulitis    NEURO  #Toxic metabolic encephalopathy  - Improvement in mentation.  AOx3. Likely 2/2 severe sepsis. wIll continue monitoring mental status   - Other possible etiologies include uremic encephalopathy, intoxication.   - Plan for dialysis today.      CARDIOVASCULAR   # HYPOTENSION   - 2/2 shock -Septic and Cardiogenic.   - Pt persistently hypotensive this admission with high Levophed requirements. Pt developed tachycardia on Inotropes and were discontinued. Presently on Levophed and Vasopressin. Will titrate down Levophed. Will discuss adding on Midodrine to the regimen.   - Continues to have elevated Lactate. Likely decrease in clearance as well inadequate source control.   - Maintain MAP >70 for renal perfusion.  - CHF with severely reduced EF 15%, caution with fluids. Per renal recs , can give IV albumin for fluids.   - May have some component of hypovolemia as indicated by bedside echo IVC assessment, will cautiously provide fluids and monitor respiratory status closely. Will obtain official TTE.      # CHF exacerbation- Hx of CHF with EF 10-15% per pt 2/2 ischemic cardiomyopathy s/p AICD as indicated by CXR   - Elevated BNP on admission with R sided effusion and edema  - Off inotropes given tacyhcardia. TTE shows EF of 15%   - Give fluids judiciously given low EF in setting of likely sepsis  - Unclear medical regimen opt. Per [facility] pharmacy ( and [place]) pt has not picked up Torsemide 20 or Metoprolol 100 since November.   - Hold ACE/Metoprolol in setting of sepsis    #AFIB  - Unclear if hx of Afib. Per pharmacy pt on coumadin. Pt currently in Sinus rhythm.   - F/u PT/PTT/INR, currently elevated. Pt s/p FFP (3/13,3/14) and Vit K (3/13) for thoracocentesis and HD catheter placement.      #CAD- Hx of MI s/p AICD vs PPI- Per pharmacy pt has not picked up Plavix since November  - Obtain collateral from outpatient cardiologist     # LE Edema   - LE edema with chronic venous stasis.   - Duplex does not show DVT    PULMONARY   #Acute resp failure   - pt with R loculated plural effusion noted to have increased work of breathing. Likely 2/2 fluid overload, FLuid studies consistent with exudative effusion.   - s/p thoracentesis on 3/3 with R chest tube placement. Overnight drainage of 2L serosanguinous fluid. WIll keep chest tube in place for 1 more day.   - Will wean off High flow NC @40%   - c/w Solucortef 50 q6h 3/12- given worsening condition and recent hx of Steroid administration 2 months ago  - Plan to keep Hb 9-10. s/p  pRBC transfusion on 3/12  - Monitor resp status.     # Possible Pnuemothrorax- due to trapped lung   - Not seen on recent Xray. Possibly skin fold   - Pt HD stable. Will monitor     #RENAL   #GILMER- 2/2 ischemic ATN  -Unknown baseline Cr presenting with Cr 3.93 with metabolic derangements.   - Likely 2/2 Sepsis, low intravascular volume and CHF  - Trend BUN and Cr.  - Renal team on board, HD catheter placed on 3/13. Plan for HD session today.   - CT abdomen and pelvis without acute obstruction.   - retroperitoneal ultrasound showing medical renal disease.     #Hyperkalemia   - PT with elevated K to 5.7 on admission,  no EKG changes   - Continue telemetry monitoring  - Trend K   - Can given Kayexalate if persists   - Plan for dialysis this afternoon     #Metabolic acidosis   - 2/2 Lactate, starvation ketoacidosis and uremia   - Lactate downtrending however still elevated.   - Will trend   - Bicarb 650 TID     #ID   - septic shock likely due to L LE cellulitis.  - Pt's condition slowly improving on Vanc/zosyn.   - repeat LE CT does not show abscess or gas.   - Given recent hospitalization, c/w Vanc + Zosyn (renally dosed) 3/13 day 4  - R lung Effusion likely from overload. s/p thoracentesis with improvement in resp status   -  HIV negative.   - Vascular surgery on board. Recommending Amputation as an option if pt continues to be unstable.   - TTE with no vegetation. Low concern for AICD infection. Will monitor and consider RUKHSANA if continues to be unstable.     #GI   - pt wit Elevated Bilrirubin and Alk phos. Abd exam benign  - CT abdomen/pelvis consistent with cholelithiasis and ascites.     #HEME  # elevated INR. Unclear etiology. Pt on coumadin? - Continue to trend coags  - Given Vit K and FFP3/12. FFP 3/13     #ANemia  - Stable at 8-9. Anemia of chronic diosease.  -s/p 1pRBC on 3/12   - WIll keep Hb around 9-10     #ENDOCRINE   - Elevated Glucose. Anion gap elevated however likely 2/2 Uremia and Lactate. Ketones in urine likely 2/2 Starvation.   - S/p insulin drip  - Currently on MISS. Given NPH 4 in AM.   - Monitor blood glucose   - A1C 8.6    F: No IVF   E: Replete K<4 Mg<2, caution in setting of acute renal failure  N: Renal Diet     Lines:  R HD catheter (3/13). R chest tube (3/12), L IJ (3/12). R A line (3/12), barnes   Drips: Levophed, Vasopressin     Full code   Dispo: MICU  Ppx: [facility]

## 2018-03-13 NOTE — PROGRESS NOTE ADULT - PROBLEM SELECTOR PLAN 6
Patient's Calcium 8.3 and phosphorus 4.1 at present.  Obtain intact PTH, VItamin D 25 and Vitamin D 1,25 level for now  Low phos/renal diet.  Monitor BMP, Calcium, K, phosphorus, mag every 6 hrly for now while being on CVVHD.

## 2018-03-13 NOTE — PROGRESS NOTE ADULT - SUBJECTIVE AND OBJECTIVE BOX
Patient seen and examined by bedside with attending this morning. Patient reports improvement in symptoms. LE pain improved, especially on the left side. Denies subjective fevers, chills, nausea vomiting.    PMH:  Type 2 diabetes mellitus with diabetic peripheral angiopathy without gangrene, with long-term curren  Essential hypertension, benign  Gout  Pacemaker  Chronic systolic heart failure  Myocardial infarction      Medication:   piperacillin/tazobactam IVPB. 2.25  norepinephrine Infusion 0.01  piperacillin/tazobactam IVPB. 2.25        Vital Signs Last 24 Hrs  T(C): 37 (13 Mar 2018 06:21), Max: 37.6 (12 Mar 2018 22:00)  T(F): 98.6 (13 Mar 2018 06:21), Max: 99.6 (12 Mar 2018 22:00)  HR: 104 (13 Mar 2018 09:00) (100 - 162)  BP: 112/92 (13 Mar 2018 09:00) (87/74 - 118/73)  BP(mean): 98 (13 Mar 2018 09:00) (74 - 100)  RR: 29 (13 Mar 2018 09:00) (15 - 36)  SpO2: 99% (13 Mar 2018 09:00) (87% - 100%)  I&O's Summary    12 Mar 2018 07:01  -  13 Mar 2018 07:00  --------------------------------------------------------  IN: 1094.4 mL / OUT: 2194 mL / NET: -1099.6 mL    13 Mar 2018 07:01  -  13 Mar 2018 09:21  --------------------------------------------------------  IN: 19.4 mL / OUT: 14 mL / NET: 5.4 mL      Physical Exam:  General: NAD, AAOx3  CV: normotensive on pressors  Extremities: +2 pitting edema of the leg and feet bilaterally, multiple small superficial ulcers over the shin b/l with minimal purulent drainage. Erythema of the feet > leg b/l extending proximally to the proximal aspect of the leg. No fluctuance appreciated.  Palpable fem/pop bilaterally, unable to appreciate PT bilaterally. Biphasic to monophasic DP. Tenderness on ROM of left ankle. ROM intact to toes and Right ankle.       LABS:                        9.3    44.8  )-----------( 259      ( 13 Mar 2018 06:30 )             28.6     03-13    136  |  101  |  93<H>  ----------------------------<  171<H>  5.5<H>   |  14<L>  |  4.02<H>    Ca    9.0      13 Mar 2018 06:30  Phos  4.6     03-13  Mg     2.1     03-13    TPro  7.3  /  Alb  2.5<L>  /  TBili  4.4<H>  /  DBili  2.4<H>  /  AST  33  /  ALT  21  /  AlkPhos  211<H>  03-13    PT/INR - ( 13 Mar 2018 06:30 )   PT: 29.0 sec;   INR: 2.56          PTT - ( 13 Mar 2018 06:30 )  PTT:38.8 sec    Radiology and Additional Studies:

## 2018-03-13 NOTE — PROGRESS NOTE ADULT - PROBLEM SELECTOR PLAN 2
Mild hyperkalemia with K level 5.4 at present likely due to poor renal clearance  Low K/Low phos/renal diet.  Continue sodium bicarbonate 650mg po tid for now  Monitor BMP every 12 hrly  Will do CVVHD for clearance only today.

## 2018-03-13 NOTE — PROGRESS NOTE ADULT - ASSESSMENT
63 M with hx of MI, CHF EF(10-15%), DM type 2, HTN, CKD, Gout p/w septic shock and bilateral LL cellulitis, tachycardic, WBC 32, lactate 6. CT of LL did not revealed any gas/collections. Admitted to MICU for further management.  Patient reports improvement in symptoms. Repeat CT from yesterday negative for nec fasc. WBC remains elevated to 44, afebrile. Lactate remains elevated to 4.8.=  -Discussed with pt the possible need for amputation, if condition worsens. Patient reports that under no circumstance that he would consent to lower extremity amputation.   Plan:  -Cont IV vanco/Zosyn  -No plan for amputation at this time  -appreciate care per primary team  -Vascular will follow   -Plan d/w Chief and Dr. Duarte

## 2018-03-13 NOTE — PROGRESS NOTE ADULT - PROBLEM SELECTOR PLAN 5
Anemia of chronic renal disease with hemoglobin 9.3 at present. Not iron deficient.  Ferritin: 1377 and T sat% 34.  obtain Haptoglobin/LDH to assess TMA although less likely due to normal platelet count.  Work up for paraproteinemia (SPEP, Serum immunofixation and Serum free light chain ratio) due to proteinuria and hematuria.  No EPO or iron at present.   Monitor hemoglobin for now  Consider hematology evaluation for anemia and persistent leukocytosis for further evaluation.

## 2018-03-13 NOTE — PROCEDURE NOTE - PROCEDURE
<<-----Click on this checkbox to enter Procedure Arterial line placement  03/13/2018    Active  ANGELIQUE

## 2018-03-13 NOTE — PROGRESS NOTE ADULT - PROBLEM SELECTOR PLAN 3
Acute on chronic systolic CHF with LVEF 15 % and severely low blood pressure  Plan per cardiology/primary ICU team for inotropic support and IV pressors for now.  Fluid restriction to <1L/day with concentration of IV drips if possible  Daily weight  Strict I/o monitoring.  Consider Cardiology evaluation for severe cardiomyopathy with fluid overload  S/p right chest tube drain with 1.9 liter pleural fluid removal.

## 2018-03-13 NOTE — PROGRESS NOTE ADULT - ATTENDING COMMENTS
Pt seen and examined at bedside.  Case d/w renal team at length.  Agree with note and plan as outlined above.  We will be following closely with you.

## 2018-03-13 NOTE — PROGRESS NOTE ADULT - ATTENDING COMMENTS
Patient with persistent pressor requirements and leukocytosis despite IV antibiotics.  Patient overall states his legs 'feel better.'  He has not improved as much as I would have liked/anticipated on the IV antibiotics.  Repeat CT yesterday did not demonstrate air or abscess/fluid collection.  Discussed that aside from antibiotics, as he does not have a localized identified problem area in his legs, the only other option I have for him is an amputation.  He states that 'under no circumstances would I want an amputation.'  He is alert and oriented, and seems to have the capacity at present to make such a decision.  We will continue medical management, and trend his progress.

## 2018-03-13 NOTE — PROCEDURE NOTE - PROCEDURE
<<-----Click on this checkbox to enter Procedure Central line placement  03/13/2018    Active  ANGELIQUE

## 2018-03-13 NOTE — PROGRESS NOTE ADULT - SUBJECTIVE AND OBJECTIVE BOX
Patient is a 63y Male seen and evaluated at bedside. Patient lying in be      acetaminophen   Tablet. 650 every 6 hours PRN  benzocaine 15 mG/menthol 3.6 mG Lozenge 1 every 4 hours PRN  bisacodyl 5 every 12 hours  dextrose 5%. 1000 <Continuous>  dextrose 50% Injectable 12.5 once  dextrose 50% Injectable 25 once  dextrose 50% Injectable 25 once  dextrose Gel 1 once PRN  folic acid 1 daily  glucagon  Injectable 1 once PRN  hydrocortisone sodium succinate Injectable 50 every 6 hours  insulin glargine Injectable (LANTUS) 6 at bedtime  insulin lispro (HumaLOG) corrective regimen sliding scale  Before meals and at bedtime  multivitamin 1 daily  norepinephrine Infusion 0.01 <Continuous>  piperacillin/tazobactam IVPB. 2.25 every 6 hours  sodium bicarbonate 650 three times a day  thiamine 100 daily  vasopressin Infusion 0.04 <Continuous>      Allergies    No Known Allergies    Intolerances        T(C): , Max: 37.6 (03-12-18 @ 22:00)  T(F): , Max: 99.6 (03-12-18 @ 22:00)  HR: 124 (03-13-18 @ 10:00)  BP: 92/76 (03-13-18 @ 10:00)  BP(mean): 85 (03-13-18 @ 10:00)  RR: 23 (03-13-18 @ 10:00)  SpO2: 100% (03-13-18 @ 10:00)  Wt(kg): --    03-12 @ 07:01  -  03-13 @ 07:00  --------------------------------------------------------  IN: 1094.4 mL / OUT: 2194 mL / NET: -1099.6 mL    03-13 @ 07:01  -  03-13 @ 10:09  --------------------------------------------------------  IN: 19.4 mL / OUT: 14 mL / NET: 5.4 mL          Review of Systems:  CONSTITUTIONAL: No fever or chills, No fatigue or tiredness.  EYES: No blurred or double vision.  RESPIRATORY: No shortness of breath, cough, hemoptysis  CARDIOVASCULAR: No Chest pain or shortness of breath  GASTROINTESTINAL: NO abdominal or flank pain, No nausea or vomiting, No diarrhea  GENITOURINARY: No dysuria or urinary burning, No difficulty passing urine, No hematuria  NEUROLOGICAL: No headaches or blurred vision  SKIN: No skin rashes   MUSCULOSKELETAL: No arthralgia, Joint pain, leg edema, No muscle pains      PHYSICAL EXAM:  GENERAL: NAD, well-developed, well nourished, alert, awake, no acute distress at present  HEAD:  Atraumatic, Normocephalic,   EYES: Bilateral conjuctiva and sclera normal   Oral cavity: Oral mucosa dry and pink  NECK: Neck supple, No JVD  CHEST/LUNG: Clear to auscultation bilaterally; No wheeze, no rales, no crepitations  HEART: Regular rate and rhythm. HOMER II/VI at LPSB, No gallop, no rub   ABDOMEN: Soft, Nontender, BS+nt, No flank tenderness.   EXTREMITIES: No clubbing, cyanosis, or edema  Neurology: AAOx3, no focal neurological deficit  SKIN: No rashes or lesions          ACCESS:     LABS:                        9.3    44.8  )-----------( 259      ( 13 Mar 2018 06:30 )             28.6     03-13    136  |  101  |  93<H>  ----------------------------<  171<H>  5.5<H>   |  14<L>  |  4.02<H>    Ca    9.0      13 Mar 2018 06:30  Phos  4.6     03-13  Mg     2.1     03-13    TPro  7.3  /  Alb  2.5<L>  /  TBili  4.4<H>  /  DBili  2.4<H>  /  AST  33  /  ALT  21  /  AlkPhos  211<H>  03-13    Uric Acid, Serum: 9.8 (03-12 @ 14:11)    PT/INR - ( 13 Mar 2018 06:30 )   PT: 29.0 sec;   INR: 2.56          PTT - ( 13 Mar 2018 06:30 )  PTT:38.8 sec          RADIOLOGY & ADDITIONAL STUDIES: Patient is a 63y Male seen and evaluated at bedside. Patient lying in bed in no acute distress at present remains on IV pressors for now. Interval placement of right chest tube drain of 1.9 liter yesterday. BUN/Creatinine still trended up today to 93/4.0. Patient remains drowsy and lethargic but arousable at persent. Mild tremors at present. WBC remains signififcantly elevated at 44.8 at present on IV antibiotics for presumptive sepsis with negative cultures for now.       acetaminophen   Tablet. 650 every 6 hours PRN  benzocaine 15 mG/menthol 3.6 mG Lozenge 1 every 4 hours PRN  bisacodyl 5 every 12 hours  dextrose 5%. 1000 <Continuous>  dextrose 50% Injectable 12.5 once  dextrose 50% Injectable 25 once  dextrose 50% Injectable 25 once  dextrose Gel 1 once PRN  folic acid 1 daily  glucagon  Injectable 1 once PRN  hydrocortisone sodium succinate Injectable 50 every 6 hours  insulin glargine Injectable (LANTUS) 6 at bedtime  insulin lispro (HumaLOG) corrective regimen sliding scale  Before meals and at bedtime  multivitamin 1 daily  norepinephrine Infusion 0.01 <Continuous>  piperacillin/tazobactam IVPB. 2.25 every 6 hours  sodium bicarbonate 650 three times a day  thiamine 100 daily  vasopressin Infusion 0.04 <Continuous>      Allergies    No Known Allergies    Intolerances        T(C): , Max: 37.6 (03-12-18 @ 22:00)  T(F): , Max: 99.6 (03-12-18 @ 22:00)  HR: 124 (03-13-18 @ 10:00)  BP: 92/76 (03-13-18 @ 10:00)  BP(mean): 85 (03-13-18 @ 10:00)  RR: 23 (03-13-18 @ 10:00)  SpO2: 100% (03-13-18 @ 10:00)  Wt(kg): --    03-12 @ 07:01  -  03-13 @ 07:00  --------------------------------------------------------  IN: 1094.4 mL / OUT: 2194 mL / NET: -1099.6 mL    03-13 @ 07:01 - 03-13 @ 10:09  --------------------------------------------------------  IN: 19.4 mL / OUT: 14 mL / NET: 5.4 mL          Review of Systems:  CONSTITUTIONAL: No fever or chills, No fatigue or tiredness.  EYES: No blurred or double vision.  RESPIRATORY: No shortness of breath, cough, hemoptysis  CARDIOVASCULAR: No Chest pain or shortness of breath  GASTROINTESTINAL: NO abdominal or flank pain, No nausea or vomiting, No diarrhea  GENITOURINARY: No dysuria or urinary burning, No difficulty passing urine, No hematuria  NEUROLOGICAL: No headaches or blurred vision  SKIN: No skin rashes   MUSCULOSKELETAL: No arthralgia, Joint pain, leg edema, No muscle pains      PHYSICAL EXAM:  GENERAL: NAD, well-developed, well nourished, alert, awake, no acute distress at present  HEAD:  Atraumatic, Normocephalic,   EYES: Bilateral conjuctiva and sclera normal   Oral cavity: Oral mucosa dry and pink  NECK: Neck supple, No JVD  CHEST/LUNG: Clear to auscultation bilaterally; No wheeze, no rales, no crepitations  HEART: Regular rate and rhythm. HOMER II/VI at LPSB, No gallop, no rub   ABDOMEN: Soft, Nontender, BS+nt, No flank tenderness.   EXTREMITIES: No clubbing, cyanosis, or edema  Neurology: AAOx3, no focal neurological deficit  SKIN: No rashes or lesions          ACCESS:     LABS:                        9.3    44.8  )-----------( 259      ( 13 Mar 2018 06:30 )             28.6     03-13    136  |  101  |  93<H>  ----------------------------<  171<H>  5.5<H>   |  14<L>  |  4.02<H>    Ca    9.0      13 Mar 2018 06:30  Phos  4.6     03-13  Mg     2.1     03-13    TPro  7.3  /  Alb  2.5<L>  /  TBili  4.4<H>  /  DBili  2.4<H>  /  AST  33  /  ALT  21  /  AlkPhos  211<H>  03-13    Uric Acid, Serum: 9.8 (03-12 @ 14:11)    PT/INR - ( 13 Mar 2018 06:30 )   PT: 29.0 sec;   INR: 2.56          PTT - ( 13 Mar 2018 06:30 )  PTT:38.8 sec          RADIOLOGY & ADDITIONAL STUDIES: Patient is a 63y Male seen and evaluated at bedside. Patient lying in bed in no acute distress at present remains on IV pressors for now. Interval placement of right chest tube drain of 1.9 liter yesterday. BUN/Creatinine still trended up today to 93/4.0. Patient remains drowsy and lethargic but arousable at persent. Mild tremors at present. WBC remains signififcantly elevated at 44.8 at present on IV antibiotics for presumptive sepsis with negative cultures for now. Patient still remains oliguric with Urine output of 250 cc in past 24 hours duration with net negative fluid balance of 1 liter after chest tube drain.      acetaminophen   Tablet. 650 every 6 hours PRN  benzocaine 15 mG/menthol 3.6 mG Lozenge 1 every 4 hours PRN  bisacodyl 5 every 12 hours  dextrose 5%. 1000 <Continuous>  dextrose 50% Injectable 12.5 once  dextrose 50% Injectable 25 once  dextrose 50% Injectable 25 once  dextrose Gel 1 once PRN  folic acid 1 daily  glucagon  Injectable 1 once PRN  hydrocortisone sodium succinate Injectable 50 every 6 hours  insulin glargine Injectable (LANTUS) 6 at bedtime  insulin lispro (HumaLOG) corrective regimen sliding scale  Before meals and at bedtime  multivitamin 1 daily  norepinephrine Infusion 0.01 <Continuous>  piperacillin/tazobactam IVPB. 2.25 every 6 hours  sodium bicarbonate 650 three times a day  thiamine 100 daily  vasopressin Infusion 0.04 <Continuous>      Allergies    No Known Allergies    Intolerances        T(C): , Max: 37.6 (03-12-18 @ 22:00)  T(F): , Max: 99.6 (03-12-18 @ 22:00)  HR: 124 (03-13-18 @ 10:00)  BP: 92/76 (03-13-18 @ 10:00)  BP(mean): 85 (03-13-18 @ 10:00)  RR: 23 (03-13-18 @ 10:00)  SpO2: 100% (03-13-18 @ 10:00)  Wt(kg): --    03-12 @ 07:01  -  03-13 @ 07:00  --------------------------------------------------------  IN: 1094.4 mL / OUT: 2194 mL / NET: -1099.6 mL    03-13 @ 07:01  - 03-13 @ 10:09  --------------------------------------------------------  IN: 19.4 mL / OUT: 14 mL / NET: 5.4 mL          Review of Systems:  CONSTITUTIONAL: Fatigue and tired, No fever or chills.  EYES: No blurred or double vision.  RESPIRATORY: Mild shortness of breath, denies any cough or hemoptysis  CARDIOVASCULAR: No Chest pain, palpitations, dizziness  GASTROINTESTINAL: No abdominal or flank pain, No nausea or vomiting, No diarrhea  GENITOURINARY: No dysuria or urinary burning,No hematuria  NEUROLOGICAL: No headaches or blurred vision  SKIN: No skin rashes   MUSCULOSKELETAL: Bilateral leg pain and swelling,, No arthralgia, Joint pain, No muscle pains      PHYSICAL EXAM:  GENERAL: Ill appearing, lethargic and fatigue, tired in no acute distress at present  HEAD:  Atraumatic, Normocephalic,   EYES: Bilateral conjuctival and scleral pallor+nt  Oral cavity: Oral mucosa dry and pale  NECK: Neck supple, Mild JVP  CHEST/LUNG: Bilateral decreased breath sounds, Bibasilar minimal rales and crepitation, Right chest tube drain present, no wheezing  HEART: Regular rate and rhythm. HOMER II/VI at LPSB, No gallop, no rub   ABDOMEN: Soft, Nontender, non distended,  BS+nt, No flank tenderness.   EXTREMITIES: Bilateral chronic venous stasis dermatitis with edema++nt  Neurology: AAOx2-3, lethargic and fatigue, Able to comprehend and answers questions at present, no focal neurological deficit          ACCESS:     LABS:                        9.3    44.8  )-----------( 259      ( 13 Mar 2018 06:30 )             28.6     03-13    136  |  101  |  93<H>  ----------------------------<  171<H>  5.5<H>   |  14<L>  |  4.02<H>    Ca    9.0      13 Mar 2018 06:30  Phos  4.6     03-13  Mg     2.1     03-13    TPro  7.3  /  Alb  2.5<L>  /  TBili  4.4<H>  /  DBili  2.4<H>  /  AST  33  /  ALT  21  /  AlkPhos  211<H>  03-13    Uric Acid, Serum: 9.8 (03-12 @ 14:11)    PT/INR - ( 13 Mar 2018 06:30 )   PT: 29.0 sec;   INR: 2.56          PTT - ( 13 Mar 2018 06:30 )  PTT:38.8 sec          RADIOLOGY & ADDITIONAL STUDIES:  < from: Xray Chest 1 View- PORTABLE-Urgent (03.12.18 @ 18:31) >    ******PRELIMINARY REPORT******    ******PRELIMINARY REPORT******            EXAM:  XR CHEST PORTABLE URGENT 1V                          PROCEDURE DATE:  03/12/2018               ******PRELIMINARY REPORT******    ******PRELIMINARY REPORT******              INTERPRETATION:  RESIDENT PRELIMINARY REPORT     XR CHEST PORTABLE URGENT 1V DATED 3/12/2018 6:31 PM    INDICATION: 63 years old Male with Line Placement.    PRIOR STUDIES: Portable chest x-ray of 3/12/2018    FINDINGS: Single AP view of the chest is submitted for review. There is   been interval placement of a right-sided chest tube. There is persistent   right pleural effusion. There is an ICD and a left-sided IJV catheter in   appropriate position. There is a retrocardiac opacity and a left effusion   similar to the prior study. The heart appears enlarged. No   pneumothoraces. No acute abnormalities of the soft tissues and osseous   structures.     IMPRESSION: Interval placement of a right-sided chest tube with   persistent right pleural effusion.            "Thank you for the opportunity to participate in the care of this   patient."  ******PRELIMINARY REPORT******    ******PRELIMINARY REPORT******          JAY HOFF M.D., RADIOLOGY RESIDENT          < from: US Abdomen Complete (03.12.18 @ 16:55) >    ******PRELIMINARY REPORT******    ******PRELIMINARY REPORT******            EXAM:  US ABDOMEN COMPLETE                          PROCEDURE DATE:  03/12/2018               ******PRELIMINARY REPORT******    ******PRELIMINARY REPORT******              INTERPRETATION:  Resident preliminary report    COMPLETE ABDOMINAL ULTRASOUND dated 3/12/2018 4:55 PM    INDICATION: 63-year-old male in septic shock, rule out drainable   collection    PRIOR STUDIES: CT abdomen and pelvis 3/10/2018    FINDINGS:     Liver: Normal echogenicity and size with no visible focal abnormality.    Intrahepatic ducts: Not dilated.    Common bile duct: Normal diameter, measuring 0.5 cm.    Gallbladder: Sludge is seen within the gallbladder. No visible   gallstones.  No wall thickening. There is pericholecystic fluid.    Spleen: Normal echogenicity and size with no visible focal abnormality.    Pancreas: The visualized portions were normal in appearance.      Abdominal aorta: The visualized portions were unremarkable.    Inferior vena cava: The visualized portions were normal in appearance.    Right kidney: Normal echogenicity. No focal lesions. Length of 10.0 cm.    Left kidney: Normal echogenicity. No focal lesions. Length of 10.3 cm.    Also noted: There is a rightpleural effusion and moderate abdominal   ascites.    IMPRESSION:  Moderate abdominal ascites and right pleural effusion similar to the   prior study.   Sludge within the gallbladder.            "Thank you for the opportunity to participate in the care of this   patient."  ******PRELIMINARY REPORT******    ******PRELIMINARY REPORT******          JAY HOFF M.D., RADIOLOGY RESIDENT                        < end of copied text >                < end of copied text >

## 2018-03-13 NOTE — PROGRESS NOTE ADULT - SUBJECTIVE AND OBJECTIVE BOX
INTERVAL HPI/OVERNIGHT EVENTS: Pt with elevated lactate to 4.8 and decreased venous saturation, was started on Milrinone for short time however stopped given tachycardia. Started on Vasopressin.     SUBJECTIVE: Patient seen and examined at bedside.  Says he is doing better and is not experiencing pain on numbness in legs.     OBJECTIVE:    VITAL SIGNS:  ICU Vital Signs Last 24 Hrs  T(C): 36.3 (13 Mar 2018 10:00), Max: 37.6 (12 Mar 2018 22:00)  T(F): 97.3 (13 Mar 2018 10:00), Max: 99.6 (12 Mar 2018 22:00)  HR: 90 (13 Mar 2018 13:00) (90 - 162)  BP: 84/70 (13 Mar 2018 13:00) (83/67 - 118/73)  BP(mean): 75 (13 Mar 2018 13:00) (72 - 100)  ABP: 108/74 (13 Mar 2018 13:00) (24/6 - 122/84)  ABP(mean): 84 (13 Mar 2018 13:00) (18 - 98)  RR: 25 (13 Mar 2018 13:00) (15 - 36)  SpO2: 93% (13 Mar 2018 13:00) (87% - 100%)        03-12 @ 07:01 - 03-13 @ 07:00  --------------------------------------------------------  IN: 1094.4 mL / OUT: 2194 mL / NET: -1099.6 mL    03-13 @ 07:01 - 03-13 @ 13:56  --------------------------------------------------------  IN: 653 mL / OUT: 44 mL / NET: 609 mL      CAPILLARY BLOOD GLUCOSE      POCT Blood Glucose.: 175 mg/dL (13 Mar 2018 11:12)      PHYSICAL EXAM:    General: . AOX3. Interval improvement in arousability.   HEENT: NC/AT, PERRL  Neck: supple  Respiratory: On HFNC. RLL crackles. No neck accessory muscle use noted. R chest tube with serosanguinous output   Cardiac: Tachycardic, +S1/S2, no murmurs, rubs, or gallops  Gastrointestinal: soft, NT/ND; no rebound or guarding; +BS  Genitourinary: +Jose  Extremities: Cool to touch. Bilateral chronic venous stasis areas of skin breaks. R ankle decrease in erythema.  1++ LE edema b/l. 1+ DP pulses b/l   Musculoskeletal: NROM x4; no joint swelling, tenderness or erythema  Neurologic: AAOx3;     MEDICATIONS:  MEDICATIONS  (STANDING):  bisacodyl 5 milliGRAM(s) Oral every 12 hours  dextrose 5%. 1000 milliLiter(s) (50 mL/Hr) IV Continuous <Continuous>  dextrose 50% Injectable 12.5 Gram(s) IV Push once  dextrose 50% Injectable 25 Gram(s) IV Push once  dextrose 50% Injectable 25 Gram(s) IV Push once  folic acid 1 milliGRAM(s) Oral daily  hydrocortisone sodium succinate Injectable 50 milliGRAM(s) IV Push every 6 hours  insulin glargine Injectable (LANTUS) 6 Unit(s) SubCutaneous at bedtime  insulin lispro (HumaLOG) corrective regimen sliding scale   SubCutaneous Before meals and at bedtime  multivitamin 1 Tablet(s) Oral daily  norepinephrine Infusion 0.01 MICROgram(s)/kG/Min (0.75 mL/Hr) IV Continuous <Continuous>  piperacillin/tazobactam IVPB. 2.25 Gram(s) IV Intermittent every 6 hours  PureFlow Dialysate RFP-400 (K 2 / Ca 3) 5000 milliLiter(s) (2000 mL/Hr) CRRT <Continuous>  sodium bicarbonate 650 milliGRAM(s) Oral three times a day  thiamine 100 milliGRAM(s) Oral daily  vasopressin Infusion 0.04 Unit(s)/Min (2.4 mL/Hr) IV Continuous <Continuous>    MEDICATIONS  (PRN):  acetaminophen   Tablet. 650 milliGRAM(s) Oral every 6 hours PRN Moderate Pain (4 - 6)  benzocaine 15 mG/menthol 3.6 mG Lozenge 1 Lozenge Oral every 4 hours PRN Sore Throat  dextrose Gel 1 Dose(s) Oral once PRN Blood Glucose LESS THAN 70 milliGRAM(s)/deciliter  glucagon  Injectable 1 milliGRAM(s) IntraMuscular once PRN Glucose LESS THAN 70 milligrams/deciliter      ALLERGIES:  Allergies    No Known Allergies    Intolerances        LABS:                        9.3    44.8  )-----------( 259      ( 13 Mar 2018 06:30 )             28.6     03-13    136  |  101  |  93<H>  ----------------------------<  171<H>  5.5<H>   |  14<L>  |  4.02<H>    Ca    9.0      13 Mar 2018 06:30  Phos  4.6     03-13  Mg     2.1     03-13    TPro  7.3  /  Alb  2.5<L>  /  TBili  4.4<H>  /  DBili  2.4<H>  /  AST  33  /  ALT  21  /  AlkPhos  211<H>  03-13    PT/INR - ( 13 Mar 2018 06:30 )   PT: 29.0 sec;   INR: 2.56          PTT - ( 13 Mar 2018 06:30 )  PTT:38.8 sec      RADIOLOGY & ADDITIONAL TESTS: Reviewed.

## 2018-03-13 NOTE — PROGRESS NOTE ADULT - PROBLEM SELECTOR PLAN 1
Oliguric GILMER likely ischemic ATN with septic/cardiogenic shock with slowly increasing renal function and waxin and waning mental status and mild hyperkalemia at present at 5.5    Plan:  Obtain urinalysis, and urine protein/creatinine ratio to assess PCR to eval for further workup for proteinuria and hematuria  Avoid Nephrotoxic agents.  Abdominal ultrasound with normal size kidney with normal echogenicity.  Adjust antibiotics as per renal dose clearance of Cr cl<10ml/min  Interval chest tube drain of right pleural effusion.  Continue inotropic support and IV pressors for now per primary ICU/cardiology team  Uric acid 9.8 at present.  Patient with increasing BUN/Creatinine with volume overload and right plerual effusion with chest tube drain now with mild hyperkalemia (5.5) with waxing and waning mental status  and mild tremors with persistent metabolic acidosis at present.  Will do CVVHD for clearance only for now. no UF at present.

## 2018-03-14 LAB
ALBUMIN SERPL ELPH-MCNC: 2.1 G/DL — LOW (ref 3.3–5)
ALP SERPL-CCNC: 150 U/L — HIGH (ref 40–120)
ALT FLD-CCNC: 20 U/L — SIGNIFICANT CHANGE UP (ref 10–45)
ANION GAP SERPL CALC-SCNC: 16 MMOL/L — SIGNIFICANT CHANGE UP (ref 5–17)
ANION GAP SERPL CALC-SCNC: 17 MMOL/L — SIGNIFICANT CHANGE UP (ref 5–17)
ANION GAP SERPL CALC-SCNC: 17 MMOL/L — SIGNIFICANT CHANGE UP (ref 5–17)
ANION GAP SERPL CALC-SCNC: 18 MMOL/L — HIGH (ref 5–17)
APTT BLD: 40.6 SEC — HIGH (ref 27.5–37.4)
AST SERPL-CCNC: 27 U/L — SIGNIFICANT CHANGE UP (ref 10–40)
BASE EXCESS BLDMV CALC-SCNC: -4.1 MMOL/L — SIGNIFICANT CHANGE UP
BASE EXCESS BLDMV CALC-SCNC: -4.1 MMOL/L — SIGNIFICANT CHANGE UP
BASOPHILS NFR BLD AUTO: 0 % — SIGNIFICANT CHANGE UP (ref 0–2)
BILIRUB SERPL-MCNC: 3.6 MG/DL — HIGH (ref 0.2–1.2)
BUN SERPL-MCNC: 60 MG/DL — HIGH (ref 7–23)
BUN SERPL-MCNC: 74 MG/DL — HIGH (ref 7–23)
BUN SERPL-MCNC: 74 MG/DL — HIGH (ref 7–23)
BUN SERPL-MCNC: 83 MG/DL — HIGH (ref 7–23)
CALCIUM SERPL-MCNC: 8.3 MG/DL — LOW (ref 8.4–10.5)
CALCIUM SERPL-MCNC: 8.3 MG/DL — LOW (ref 8.4–10.5)
CALCIUM SERPL-MCNC: 8.5 MG/DL — SIGNIFICANT CHANGE UP (ref 8.4–10.5)
CALCIUM SERPL-MCNC: 8.6 MG/DL — SIGNIFICANT CHANGE UP (ref 8.4–10.5)
CHLORIDE SERPL-SCNC: 100 MMOL/L — SIGNIFICANT CHANGE UP (ref 96–108)
CHLORIDE SERPL-SCNC: 100 MMOL/L — SIGNIFICANT CHANGE UP (ref 96–108)
CHLORIDE SERPL-SCNC: 102 MMOL/L — SIGNIFICANT CHANGE UP (ref 96–108)
CHLORIDE SERPL-SCNC: 102 MMOL/L — SIGNIFICANT CHANGE UP (ref 96–108)
CO2 SERPL-SCNC: 17 MMOL/L — LOW (ref 22–31)
CO2 SERPL-SCNC: 18 MMOL/L — LOW (ref 22–31)
CO2 SERPL-SCNC: 20 MMOL/L — LOW (ref 22–31)
CO2 SERPL-SCNC: 21 MMOL/L — LOW (ref 22–31)
CREAT SERPL-MCNC: 2.66 MG/DL — HIGH (ref 0.5–1.3)
CREAT SERPL-MCNC: 2.98 MG/DL — HIGH (ref 0.5–1.3)
CREAT SERPL-MCNC: 3.13 MG/DL — HIGH (ref 0.5–1.3)
CREAT SERPL-MCNC: 3.6 MG/DL — HIGH (ref 0.5–1.3)
EOSINOPHIL NFR BLD AUTO: 0 % — SIGNIFICANT CHANGE UP (ref 0–6)
GAS PNL BLDMV: SIGNIFICANT CHANGE UP
GAS PNL BLDMV: SIGNIFICANT CHANGE UP
GAS PNL BLDV: SIGNIFICANT CHANGE UP
GLUCOSE BLDC GLUCOMTR-MCNC: 122 MG/DL — HIGH (ref 70–99)
GLUCOSE BLDC GLUCOMTR-MCNC: 134 MG/DL — HIGH (ref 70–99)
GLUCOSE BLDC GLUCOMTR-MCNC: 134 MG/DL — HIGH (ref 70–99)
GLUCOSE BLDC GLUCOMTR-MCNC: 136 MG/DL — HIGH (ref 70–99)
GLUCOSE SERPL-MCNC: 132 MG/DL — HIGH (ref 70–99)
GLUCOSE SERPL-MCNC: 136 MG/DL — HIGH (ref 70–99)
GLUCOSE SERPL-MCNC: 148 MG/DL — HIGH (ref 70–99)
GLUCOSE SERPL-MCNC: 151 MG/DL — HIGH (ref 70–99)
HAV IGM SER-ACNC: SIGNIFICANT CHANGE UP
HBV CORE AB SER-ACNC: SIGNIFICANT CHANGE UP
HBV CORE IGM SER-ACNC: SIGNIFICANT CHANGE UP
HBV SURFACE AB SER-ACNC: SIGNIFICANT CHANGE UP
HBV SURFACE AG SER-ACNC: SIGNIFICANT CHANGE UP
HCO3 BLDMV-SCNC: 20 MMOL/L — SIGNIFICANT CHANGE UP
HCO3 BLDMV-SCNC: 20 MMOL/L — SIGNIFICANT CHANGE UP
HCT VFR BLD CALC: 23.9 % — LOW (ref 39–50)
HCT VFR BLD CALC: 24.4 % — LOW (ref 39–50)
HCT VFR BLD CALC: 26.2 % — LOW (ref 39–50)
HCT VFR BLD CALC: 26.4 % — LOW (ref 39–50)
HCV AB S/CO SERPL IA: 0.11 S/CO — SIGNIFICANT CHANGE UP
HCV AB SERPL-IMP: SIGNIFICANT CHANGE UP
HGB BLD-MCNC: 7.8 G/DL — LOW (ref 13–17)
HGB BLD-MCNC: 7.8 G/DL — LOW (ref 13–17)
HGB BLD-MCNC: 8.3 G/DL — LOW (ref 13–17)
HGB BLD-MCNC: 8.5 G/DL — LOW (ref 13–17)
INR BLD: 2.35 — HIGH (ref 0.88–1.16)
LACTATE SERPL-SCNC: 1.8 MMOL/L — SIGNIFICANT CHANGE UP (ref 0.5–2)
LACTATE SERPL-SCNC: 2.6 MMOL/L — HIGH (ref 0.5–2)
LYMPHOCYTES # BLD AUTO: 3 % — LOW (ref 13–44)
MAGNESIUM SERPL-MCNC: 1.9 MG/DL — SIGNIFICANT CHANGE UP (ref 1.6–2.6)
MCHC RBC-ENTMCNC: 28.3 PG — SIGNIFICANT CHANGE UP (ref 27–34)
MCHC RBC-ENTMCNC: 28.4 PG — SIGNIFICANT CHANGE UP (ref 27–34)
MCHC RBC-ENTMCNC: 28.9 PG — SIGNIFICANT CHANGE UP (ref 27–34)
MCHC RBC-ENTMCNC: 29.2 PG — SIGNIFICANT CHANGE UP (ref 27–34)
MCHC RBC-ENTMCNC: 31.4 G/DL — LOW (ref 32–36)
MCHC RBC-ENTMCNC: 32 G/DL — SIGNIFICANT CHANGE UP (ref 32–36)
MCHC RBC-ENTMCNC: 32.4 G/DL — SIGNIFICANT CHANGE UP (ref 32–36)
MCHC RBC-ENTMCNC: 32.6 G/DL — SIGNIFICANT CHANGE UP (ref 32–36)
MCV RBC AUTO: 88.4 FL — SIGNIFICANT CHANGE UP (ref 80–100)
MCV RBC AUTO: 89.1 FL — SIGNIFICANT CHANGE UP (ref 80–100)
MCV RBC AUTO: 89.5 FL — SIGNIFICANT CHANGE UP (ref 80–100)
MCV RBC AUTO: 90.4 FL — SIGNIFICANT CHANGE UP (ref 80–100)
MONOCYTES NFR BLD AUTO: 3 % — SIGNIFICANT CHANGE UP (ref 2–14)
NEUTROPHILS NFR BLD AUTO: 75 % — SIGNIFICANT CHANGE UP (ref 43–77)
NON-GYNECOLOGICAL CYTOLOGY STUDY: SIGNIFICANT CHANGE UP
O2 CT VFR BLD CALC: 174 MMHG — HIGH (ref 30–65)
O2 CT VFR BLD CALC: 31 MMHG — SIGNIFICANT CHANGE UP (ref 30–65)
PCO2 BLDMV: 33 MMHG — SIGNIFICANT CHANGE UP (ref 30–65)
PCO2 BLDMV: 35 MMHG — SIGNIFICANT CHANGE UP (ref 30–65)
PH BLDMV: 7.38 — SIGNIFICANT CHANGE UP (ref 7.2–7.45)
PH BLDMV: 7.4 — SIGNIFICANT CHANGE UP (ref 7.2–7.45)
PHOSPHATE SERPL-MCNC: 3.8 MG/DL — SIGNIFICANT CHANGE UP (ref 2.5–4.5)
PHOSPHATE SERPL-MCNC: 4.1 MG/DL — SIGNIFICANT CHANGE UP (ref 2.5–4.5)
PHOSPHATE SERPL-MCNC: 4.2 MG/DL — SIGNIFICANT CHANGE UP (ref 2.5–4.5)
PHOSPHATE SERPL-MCNC: 4.4 MG/DL — SIGNIFICANT CHANGE UP (ref 2.5–4.5)
PLATELET # BLD AUTO: 187 K/UL — SIGNIFICANT CHANGE UP (ref 150–400)
PLATELET # BLD AUTO: 187 K/UL — SIGNIFICANT CHANGE UP (ref 150–400)
PLATELET # BLD AUTO: 193 K/UL — SIGNIFICANT CHANGE UP (ref 150–400)
PLATELET # BLD AUTO: 204 K/UL — SIGNIFICANT CHANGE UP (ref 150–400)
POTASSIUM SERPL-MCNC: 4.2 MMOL/L — SIGNIFICANT CHANGE UP (ref 3.5–5.3)
POTASSIUM SERPL-MCNC: 4.5 MMOL/L — SIGNIFICANT CHANGE UP (ref 3.5–5.3)
POTASSIUM SERPL-MCNC: 4.6 MMOL/L — SIGNIFICANT CHANGE UP (ref 3.5–5.3)
POTASSIUM SERPL-MCNC: 4.9 MMOL/L — SIGNIFICANT CHANGE UP (ref 3.5–5.3)
POTASSIUM SERPL-SCNC: 4.2 MMOL/L — SIGNIFICANT CHANGE UP (ref 3.5–5.3)
POTASSIUM SERPL-SCNC: 4.5 MMOL/L — SIGNIFICANT CHANGE UP (ref 3.5–5.3)
POTASSIUM SERPL-SCNC: 4.6 MMOL/L — SIGNIFICANT CHANGE UP (ref 3.5–5.3)
POTASSIUM SERPL-SCNC: 4.9 MMOL/L — SIGNIFICANT CHANGE UP (ref 3.5–5.3)
PROT SERPL-MCNC: 6.5 G/DL — SIGNIFICANT CHANGE UP (ref 6–8.3)
PROTHROM AB SERPL-ACNC: 26.6 SEC — HIGH (ref 9.8–12.7)
RBC # BLD: 2.67 M/UL — LOW (ref 4.2–5.8)
RBC # BLD: 2.76 M/UL — LOW (ref 4.2–5.8)
RBC # BLD: 2.92 M/UL — LOW (ref 4.2–5.8)
RBC # BLD: 2.94 M/UL — LOW (ref 4.2–5.8)
RBC # FLD: 15.2 % — SIGNIFICANT CHANGE UP (ref 10.3–16.9)
RBC # FLD: 15.3 % — SIGNIFICANT CHANGE UP (ref 10.3–16.9)
RBC # FLD: 15.5 % — SIGNIFICANT CHANGE UP (ref 10.3–16.9)
RBC # FLD: 15.5 % — SIGNIFICANT CHANGE UP (ref 10.3–16.9)
SAO2 % BLDMV: 100 % — SIGNIFICANT CHANGE UP
SAO2 % BLDMV: 47 % — SIGNIFICANT CHANGE UP
SODIUM SERPL-SCNC: 136 MMOL/L — SIGNIFICANT CHANGE UP (ref 135–145)
SODIUM SERPL-SCNC: 137 MMOL/L — SIGNIFICANT CHANGE UP (ref 135–145)
SODIUM SERPL-SCNC: 137 MMOL/L — SIGNIFICANT CHANGE UP (ref 135–145)
SODIUM SERPL-SCNC: 138 MMOL/L — SIGNIFICANT CHANGE UP (ref 135–145)
VANCOMYCIN TROUGH SERPL-MCNC: 15 UG/ML — SIGNIFICANT CHANGE UP (ref 10–20)
VANCOMYCIN TROUGH SERPL-MCNC: 17 UG/ML — SIGNIFICANT CHANGE UP (ref 10–20)
WBC # BLD: 32.7 K/UL — HIGH (ref 3.8–10.5)
WBC # BLD: 36.4 K/UL — HIGH (ref 3.8–10.5)
WBC # BLD: 37.7 K/UL — HIGH (ref 3.8–10.5)
WBC # BLD: 40.4 K/UL — CRITICAL HIGH (ref 3.8–10.5)
WBC # FLD AUTO: 32.7 K/UL — HIGH (ref 3.8–10.5)
WBC # FLD AUTO: 36.4 K/UL — HIGH (ref 3.8–10.5)
WBC # FLD AUTO: 37.7 K/UL — HIGH (ref 3.8–10.5)
WBC # FLD AUTO: 40.4 K/UL — CRITICAL HIGH (ref 3.8–10.5)

## 2018-03-14 PROCEDURE — 99233 SBSQ HOSP IP/OBS HIGH 50: CPT | Mod: GC

## 2018-03-14 PROCEDURE — 71045 X-RAY EXAM CHEST 1 VIEW: CPT | Mod: 26

## 2018-03-14 RX ORDER — DOBUTAMINE HCL 250MG/20ML
0.5 VIAL (ML) INTRAVENOUS
Qty: 500 | Refills: 0 | Status: DISCONTINUED | OUTPATIENT
Start: 2018-03-14 | End: 2018-03-14

## 2018-03-14 RX ORDER — PIPERACILLIN AND TAZOBACTAM 4; .5 G/20ML; G/20ML
3.38 INJECTION, POWDER, LYOPHILIZED, FOR SOLUTION INTRAVENOUS EVERY 6 HOURS
Qty: 0 | Refills: 0 | Status: DISCONTINUED | OUTPATIENT
Start: 2018-03-14 | End: 2018-03-16

## 2018-03-14 RX ORDER — DOBUTAMINE HCL 250MG/20ML
0.5 VIAL (ML) INTRAVENOUS
Qty: 500 | Refills: 0 | Status: DISCONTINUED | OUTPATIENT
Start: 2018-03-14 | End: 2018-03-15

## 2018-03-14 RX ORDER — ALBUMIN HUMAN 25 %
50 VIAL (ML) INTRAVENOUS EVERY 6 HOURS
Qty: 0 | Refills: 0 | Status: DISCONTINUED | OUTPATIENT
Start: 2018-03-14 | End: 2018-03-20

## 2018-03-14 RX ORDER — VANCOMYCIN HCL 1 G
1250 VIAL (EA) INTRAVENOUS ONCE
Qty: 0 | Refills: 0 | Status: COMPLETED | OUTPATIENT
Start: 2018-03-14 | End: 2018-03-14

## 2018-03-14 RX ADMIN — Medication 650 MILLIGRAM(S): at 22:09

## 2018-03-14 RX ADMIN — PIPERACILLIN AND TAZOBACTAM 200 GRAM(S): 4; .5 INJECTION, POWDER, LYOPHILIZED, FOR SOLUTION INTRAVENOUS at 11:22

## 2018-03-14 RX ADMIN — Medication 166.67 MILLIGRAM(S): at 16:17

## 2018-03-14 RX ADMIN — Medication 50 MILLIGRAM(S): at 17:57

## 2018-03-14 RX ADMIN — Medication 1.2 MICROGRAM(S)/KG/MIN: at 14:53

## 2018-03-14 RX ADMIN — Medication 1 MILLIGRAM(S): at 11:22

## 2018-03-14 RX ADMIN — PIPERACILLIN AND TAZOBACTAM 200 GRAM(S): 4; .5 INJECTION, POWDER, LYOPHILIZED, FOR SOLUTION INTRAVENOUS at 00:34

## 2018-03-14 RX ADMIN — PIPERACILLIN AND TAZOBACTAM 200 GRAM(S): 4; .5 INJECTION, POWDER, LYOPHILIZED, FOR SOLUTION INTRAVENOUS at 17:56

## 2018-03-14 RX ADMIN — Medication 1 TABLET(S): at 11:23

## 2018-03-14 RX ADMIN — Medication 50 MILLIGRAM(S): at 06:12

## 2018-03-14 RX ADMIN — Medication 50 MILLILITER(S): at 15:52

## 2018-03-14 RX ADMIN — PIPERACILLIN AND TAZOBACTAM 200 GRAM(S): 4; .5 INJECTION, POWDER, LYOPHILIZED, FOR SOLUTION INTRAVENOUS at 23:19

## 2018-03-14 RX ADMIN — PIPERACILLIN AND TAZOBACTAM 200 GRAM(S): 4; .5 INJECTION, POWDER, LYOPHILIZED, FOR SOLUTION INTRAVENOUS at 06:13

## 2018-03-14 RX ADMIN — Medication 650 MILLIGRAM(S): at 06:38

## 2018-03-14 RX ADMIN — Medication 5 MILLIGRAM(S): at 11:30

## 2018-03-14 RX ADMIN — Medication 50 MILLIGRAM(S): at 23:19

## 2018-03-14 RX ADMIN — VASOPRESSIN 2.4 UNIT(S)/MIN: 20 INJECTION INTRAVENOUS at 06:00

## 2018-03-14 RX ADMIN — INSULIN GLARGINE 6 UNIT(S): 100 INJECTION, SOLUTION SUBCUTANEOUS at 22:09

## 2018-03-14 RX ADMIN — Medication 50 MILLILITER(S): at 18:23

## 2018-03-14 RX ADMIN — Medication 50 MILLIGRAM(S): at 00:33

## 2018-03-14 RX ADMIN — Medication 50 MILLIGRAM(S): at 11:22

## 2018-03-14 RX ADMIN — Medication 650 MILLIGRAM(S): at 14:05

## 2018-03-14 RX ADMIN — Medication 100 MILLIGRAM(S): at 11:23

## 2018-03-14 NOTE — PROGRESS NOTE ADULT - PROBLEM SELECTOR PLAN 4
Anemia of chronic renal disease with hemoglobin 7.8 at present. Not iron deficient.  Ferritin: 1377 and T sat% 34.  Obtain  Haptoglobin/LDH to assess TMA although less likely due to normal platelet count.  Work up for paraproteinemia (SPEP, Serum immunofixation and Serum free light chain ratio) due to proteinuria and hematuria.  No EPO or iron at present.   Monitor hemoglobin for now  Consider hematology evaluation for anemia and persistent leukocytosis for further evaluation.

## 2018-03-14 NOTE — PROGRESS NOTE ADULT - ASSESSMENT
63 M with hx of MI, CHF EF(10-15%), DM type 2, HTN, CKD, Gout p/w septic shock and bilateral LL cellulitis, tachycardic, WBC 32, lactate 6. CT of LL did not revealed any gas/collections. Admitted to MICU for further management.  Patient reports improvement in symptoms. Repeat CT 3/12 negative for nec fasc. WBC remains elevated to 40, afebrile. Lactate downtrending 1.8 [2.6] [4.6].  Pressor requirements decreasing norepinephrine 5 down from 16 and levophed 1.5 down from 2.5 yesterday. Patient currently normotensive.   -Dr. Duarte discussed with pt the possible need for amputation, if condition worsens. Patient reports that under no circumstance that he would consent to lower extremity amputation.   Plan:  -Cont IV abx  -No plan for amputation at this time  -appreciate care per primary team  -Vascular will follow   -Plan d/w Chief and Dr. Duarte 63 M with hx of MI, CHF EF(10-15%), DM type 2, HTN, CKD, Gout p/w septic shock and bilateral LL cellulitis, tachycardic, WBC 32, lactate 6. CT of LL did not revealed any gas/collections. Admitted to MICU for further management.  Patient reports improvement in symptoms. Repeat CT 3/12 negative for nec fasc. WBC remains elevated to 40 last night and down to 32.7 this morning. afebrile. Lactate downtrending 1.8 [2.6] [4.6].  Pressor requirements decreasing norepinephrine 5 down from 16 and levophed 1.5 down from 2.5 yesterday. Patient currently normotensive.   -Dr. Duarte discussed with pt the possible need for amputation, if condition worsens. Patient reports that under no circumstance that he would consent to lower extremity amputation.   Plan:  -Cont IV abx  -No plan for amputation at this time  -appreciate care per primary team  -Vascular will follow   -Plan d/w Chief and Dr. Duarte

## 2018-03-14 NOTE — PROGRESS NOTE ADULT - SUBJECTIVE AND OBJECTIVE BOX
INTERVAL HPI/OVERNIGHT EVENTS: Started on CRRT. Vanc level on 17 overnight no doses given.     SUBJECTIVE: Patient seen and examined at bedside. Appears sleepy. Does not endorse any discomfort.     OBJECTIVE:    VITAL SIGNS:  ICU Vital Signs Last 24 Hrs  T(C): 35.8 (14 Mar 2018 09:58), Max: 36.3 (13 Mar 2018 19:00)  T(F): 96.5 (14 Mar 2018 09:58), Max: 97.4 (13 Mar 2018 19:00)  HR: 86 (14 Mar 2018 11:00) (80 - 112)  BP: 94/73 (14 Mar 2018 11:00) (84/66 - 103/85)  BP(mean): 84 (14 Mar 2018 11:00) (75 - 95)  ABP: 98/62 (14 Mar 2018 11:00) (88/68 - 120/86)  ABP(mean): 74 (14 Mar 2018 11:00) (70 - 94)  RR: 26 (14 Mar 2018 11:00) (20 - 34)  SpO2: 96% (14 Mar 2018 11:00) (75% - 100%)        03-13 @ 07:01  -  03-14 @ 07:00  --------------------------------------------------------  IN: 1040 mL / OUT: 319 mL / NET: 721 mL    03-14 @ 07:01 - 03-14 @ 12:07  --------------------------------------------------------  IN: 131.5 mL / OUT: 10 mL / NET: 121.5 mL      CAPILLARY BLOOD GLUCOSE      POCT Blood Glucose.: 122 mg/dL (14 Mar 2018 11:03)      PHYSICAL EXAM:    General: . AOX3. Appears lethargic.   HEENT: NC/AT, PERRL  Neck: supple  Respiratory: On HFNC. RLL crackles. No neck accessory muscle use noted. R chest tube with no output.   Cardiac: Tachycardic, +S1/S2, no murmurs, rubs, or gallops  Gastrointestinal: soft, NT/ND; no rebound or guarding; +BS  Genitourinary: +Jose  Extremities: Warm to touch. Bilateral chronic venous stasis areas of skin breaks. R ankle decrease in erythema.  1+ LE edema b/l. 1+ DP pulses b/l   Musculoskeletal: NROM x4; no joint swelling, tenderness or erythema    Neurologic: AAOx3;   MEDICATIONS:  MEDICATIONS  (STANDING):  bisacodyl 5 milliGRAM(s) Oral daily  dextrose 5%. 1000 milliLiter(s) (50 mL/Hr) IV Continuous <Continuous>  dextrose 50% Injectable 12.5 Gram(s) IV Push once  dextrose 50% Injectable 25 Gram(s) IV Push once  dextrose 50% Injectable 25 Gram(s) IV Push once  folic acid 1 milliGRAM(s) Oral daily  hydrocortisone sodium succinate Injectable 50 milliGRAM(s) IV Push every 6 hours  insulin glargine Injectable (LANTUS) 6 Unit(s) SubCutaneous at bedtime  insulin lispro (HumaLOG) corrective regimen sliding scale   SubCutaneous Before meals and at bedtime  multivitamin 1 Tablet(s) Oral daily  norepinephrine Infusion 0.01 MICROgram(s)/kG/Min (0.75 mL/Hr) IV Continuous <Continuous>  piperacillin/tazobactam IVPB. 2.25 Gram(s) IV Intermittent every 6 hours  PureFlow Dialysate RFP-400 (K 2 / Ca 3) 5000 milliLiter(s) (2000 mL/Hr) CRRT <Continuous>  sodium bicarbonate 650 milliGRAM(s) Oral three times a day  thiamine 100 milliGRAM(s) Oral daily  vasopressin Infusion 0.04 Unit(s)/Min (2.4 mL/Hr) IV Continuous <Continuous>    MEDICATIONS  (PRN):  acetaminophen   Tablet. 650 milliGRAM(s) Oral every 6 hours PRN Moderate Pain (4 - 6)  benzocaine 15 mG/menthol 3.6 mG Lozenge 1 Lozenge Oral every 4 hours PRN Sore Throat  dextrose Gel 1 Dose(s) Oral once PRN Blood Glucose LESS THAN 70 milliGRAM(s)/deciliter  glucagon  Injectable 1 milliGRAM(s) IntraMuscular once PRN Glucose LESS THAN 70 milligrams/deciliter      ALLERGIES:  Allergies    No Known Allergies    Intolerances        LABS:                        7.8    32.7  )-----------( 187      ( 14 Mar 2018 07:05 )             24.4     03-14    137  |  102  |  74<H>  ----------------------------<  136<H>  4.6   |  18<L>  |  3.13<H>    Ca    8.3<L>      14 Mar 2018 07:05  Phos  4.2     03-14  Mg     1.9     03-14    TPro  6.5  /  Alb  2.1<L>  /  TBili  3.6<H>  /  DBili  x   /  AST  27  /  ALT  20  /  AlkPhos  150<H>  03-14    PT/INR - ( 14 Mar 2018 07:05 )   PT: 26.6 sec;   INR: 2.35          PTT - ( 14 Mar 2018 07:05 )  PTT:40.6 sec      RADIOLOGY & ADDITIONAL TESTS: Reviewed.

## 2018-03-14 NOTE — PROGRESS NOTE ADULT - SUBJECTIVE AND OBJECTIVE BOX
Patient seen and examined at bedside. Was sleeping while being dialyzed but arousable and alert and oriented. Patient reports continued improvement in LE pain. Had no issues overnight and no complaints. No fevers, chills, sob, n/v. Emphasized that he does not want amputations.       PMH:  Type 2 diabetes mellitus with diabetic peripheral angiopathy without gangrene, with long-term curren  Essential hypertension, benign  Gout  Pacemaker  Chronic systolic heart failure  Myocardial infarction      Medication:   piperacillin/tazobactam IVPB. 2.25  norepinephrine Infusion 0.01  piperacillin/tazobactam IVPB. 2.25        Vital Signs Last 24 Hrs  T(C): 35.8 (14 Mar 2018 09:58), Max: 36.3 (13 Mar 2018 19:00)  T(F): 96.5 (14 Mar 2018 09:58), Max: 97.4 (13 Mar 2018 19:00)  HR: 87 (14 Mar 2018 08:11) (80 - 112)  BP: 84/66 (14 Mar 2018 07:00) (83/67 - 103/85)  BP(mean): 78 (14 Mar 2018 07:00) (72 - 95)  RR: 21 (14 Mar 2018 08:11) (17 - 34)  SpO2: 100% (14 Mar 2018 06:26) (75% - 100%)  I&O's Summary    13 Mar 2018 07:01  -  14 Mar 2018 07:00  --------------------------------------------------------  IN: 1040 mL / OUT: 319 mL / NET: 721 mL    14 Mar 2018 07:01  -  14 Mar 2018 10:14  --------------------------------------------------------  IN: 7 mL / OUT: 0 mL / NET: 7 mL        Physical Exam:  General: NAD, AAOx3  CV: normotensive on pressors  Extremities: +2 pitting edema of the leg and feet bilaterally, multiple small superficial ulcers over the shin b/l with minimal serous drainage  - Erythema of the feet seems improved  - No fluctuance appreciated.  Palpable fem/pop bilaterally, unable to appreciate PT bilaterally.   - Biphasic to monophasic DP      LABS:                        7.8    32.7  )-----------( 187      ( 14 Mar 2018 07:05 )             24.4     03-14    137  |  102  |  74<H>  ----------------------------<  136<H>  4.6   |  18<L>  |  3.13<H>    Ca    8.3<L>      14 Mar 2018 07:05  Phos  4.2     03-14  Mg     1.9     03-14    TPro  6.5  /  Alb  2.1<L>  /  TBili  3.6<H>  /  DBili  x   /  AST  27  /  ALT  20  /  AlkPhos  150<H>  03-14    PT/INR - ( 14 Mar 2018 07:05 )   PT: 26.6 sec;   INR: 2.35          PTT - ( 14 Mar 2018 07:05 )  PTT:40.6 sec    Radiology and Additional Studies:

## 2018-03-14 NOTE — PROGRESS NOTE ADULT - PROBLEM SELECTOR PLAN 1
Oliguric GILMER likely ischemic ATN with septic/cardiogenic shock now on RRT via CVVHD with improving renal function    Plan:  Avoid Nephrotoxic agents.  Abdominal ultrasound with normal size kidney with normal echogenicity.  Adjust antibiotics per renal dose adjustment on CVVHD for now due to increased clearance.   Monitor Vancomycin trough level   Interval chest tube drain of right pleural effusion with 200cc in past 24 hours  Continue inotropic support and IV pressors with levophed and vasopressin for now per primary ICU/cardiology team  Eliezer continue CVVHD for now for clearance only and still no UF due to persistent shock requiring IV levophed and vasopressin for now with current parameters of CVVHD for now.  Monitor BMP, Phosphorus, Mag, Calcium, CBC every 6 hrly while on CVVHD.

## 2018-03-14 NOTE — PROGRESS NOTE ADULT - SUBJECTIVE AND OBJECTIVE BOX
Patient is a 63y Male seen and evaluated at bedside. Patient lying in bed in no acute distress remains critically ill on IV pressors with Levophed and Vasopressin at present. Interval initiation of RRT via CVVHD yesterday with clotting once. Tolerating CVVHD at present. BUn/Creatinine slowly trending down on CVVHD at present with improving mental status at present. Last 24 hours patient remain oliguric with urine output 120 cc with net positive fluid balance of 700 cc. Total net positive 3L since admission for now.      acetaminophen   Tablet. 650 every 6 hours PRN  benzocaine 15 mG/menthol 3.6 mG Lozenge 1 every 4 hours PRN  bisacodyl 5 daily  dextrose 5%. 1000 <Continuous>  dextrose 50% Injectable 12.5 once  dextrose 50% Injectable 25 once  dextrose 50% Injectable 25 once  dextrose Gel 1 once PRN  folic acid 1 daily  glucagon  Injectable 1 once PRN  hydrocortisone sodium succinate Injectable 50 every 6 hours  insulin glargine Injectable (LANTUS) 6 at bedtime  insulin lispro (HumaLOG) corrective regimen sliding scale  Before meals and at bedtime  multivitamin 1 daily  norepinephrine Infusion 0.01 <Continuous>  piperacillin/tazobactam IVPB. 2.25 every 6 hours  PureFlow Dialysate RFP-400 (K 2 / Ca 3) 5000 <Continuous>  sodium bicarbonate 650 three times a day  thiamine 100 daily  vasopressin Infusion 0.04 <Continuous>      Allergies    No Known Allergies    Intolerances        T(C): , Max: 36.3 (03-13-18 @ 19:00)  T(F): , Max: 97.4 (03-13-18 @ 19:00)  HR: 87 (03-14-18 @ 08:11)  BP: 84/66 (03-14-18 @ 07:00)  BP(mean): 78 (03-14-18 @ 07:00)  RR: 21 (03-14-18 @ 08:11)  SpO2: 100% (03-14-18 @ 06:26)  Wt(kg): --    03-13 @ 07:01  -  03-14 @ 07:00  --------------------------------------------------------  IN: 1040 mL / OUT: 319 mL / NET: 721 mL    03-14 @ 07:01  -  03-14 @ 10:38  --------------------------------------------------------  IN: 7 mL / OUT: 0 mL / NET: 7 mL          Review of Systems:  CONSTITUTIONAL: Fatigue and tired,  No fever or chills.  EYES: No blurred or double vision.  RESPIRATORY: No shortness of breath, cough, hemoptysis  CARDIOVASCULAR: No Chest pain, palpitations, dizziness at present.  GASTROINTESTINAL: Remains with poor appetite, No abdominal or flank pain, No nausea or vomiting, No diarrhea  GENITOURINARY: Still diminished urine output, No dysuria or urinary burning, No hematuria  NEUROLOGICAL: Lethargy and confusion improving, No headaches or blurred vision  SKIN: No skin rashes   MUSCULOSKELETAL: No arthralgia, Joint pain, No muscle pains      PHYSICAL EXAM:  GENERAL: Ill appearing, alert, awake, lethargic and fatigue, no acute distress at present  HEAD:  Atraumatic, Normocephalic,   EYES: Bilateral conjuctiva and sclera normal   Oral cavity: Oral mucosa dry and pale  NECK: Neck supple, No JVD  CHEST/LUNG: Bilateral decreased breath sounds, Bibasilar minimal rales and crepitations, Rigtht sided chest tube drain present.  HEART: Regular rate and rhythm. HOMER II/VI at LPSB, No gallop, no rub   ABDOMEN: Soft, Nontender, non distended, BS+nt, No flank tenderness.   EXTREMITIES: Bilateral chronic leg edema with venous stasis dermattitis noted,  Neurology: AAOx2-3, lethargic and fatigue, slow to respond at present, no focal neurological deficit  SKIN: No rashes or lesions          ACCESS: Right IJ HD catheter present. No active bleeding or hematoma    LABS:                        7.8    32.7  )-----------( 187      ( 14 Mar 2018 07:05 )             24.4     03-14    137  |  102  |  74<H>  ----------------------------<  136<H>  4.6   |  18<L>  |  3.13<H>    Ca    8.3<L>      14 Mar 2018 07:05  Phos  4.2     03-14  Mg     1.9     03-14    TPro  6.5  /  Alb  2.1<L>  /  TBili  3.6<H>  /  DBili  x   /  AST  27  /  ALT  20  /  AlkPhos  150<H>  03-14    Hepatitis C Virus S/CO Ratio: 0.11 S/CO (03-13 @ 21:59)  Hepatitis C Virus Interpretation: Nonreact (03-13 @ 21:59)    PT/INR - ( 14 Mar 2018 07:05 )   PT: 26.6 sec;   INR: 2.35          PTT - ( 14 Mar 2018 07:05 )  PTT:40.6 sec          RADIOLOGY & ADDITIONAL STUDIES:  < from: Xray Chest 1 View-PORTABLE IMMEDIATE (03.13.18 @ 13:50) >    EXAM:  XR CHEST PORTABLE IMMED 1V                          PROCEDURE DATE:  03/13/2018                     INTERPRETATION:  Portable chest    History: follow up abnormal exam, line placement    Right side jugular venous catheter with tip in SVC, appearing since prior   exam earlier same day. No pneumothorax.  No other change.    Left pleural effusion again noted.    Impression:    Right venous catheter in good position. No pneumothorax.            "Thank you for the opportunity to participate in the care of this   patient."        HESHAM BERRIOS M.D., ATTENDING RADIOLOGIST  This document has been electronically signed. Mar 13 2018  3:05PM                  < end of copied text >

## 2018-03-14 NOTE — PROGRESS NOTE ADULT - ASSESSMENT
ASSESSMENT: 63M PMH HFrEF 10-15% (ischemic), MI, s/p AICD vs PPM, possible Afib, HTN, DM2 on insulin, possible CKD, and gout, who presents with a chief complaint of generalized weakness now in septic shock likely 2/2 LE cellulitis    NEURO  #Toxic metabolic encephalopathy  - Lethargic. Likely 2/2 severe sepsis. wIll continue monitoring mental status   - Other possible etiologies include uremic encephalopathy, intoxication.   - Will observe on CRRT and assess status after uremia resolution.      CARDIOVASCULAR   # HYPOTENSION   - 2/2 shock -Septic and Cardiogenic.   - Pt persistently hypotensive this admission with high Levophed requirements. Pt developed tachycardia on Inotropes and were discontinued. Presently on Levophed and Vasopressin with decreasing requirements.  Will discuss adding on Midodrine to the regimen.   - Continues to have elevated Lactate. Likely decrease in clearance as well inadequate source control.   - Maintain MAP >70 for renal perfusion.  - CHF with severely reduced EF 15%, caution with fluids. Per renal recs , can give IV albumin for fluids.     # CHF exacerbation- Hx of CHF with EF 10-15% per pt 2/2 ischemic cardiomyopathy s/p AICD as indicated by CXR   - Elevated BNP on admission with R sided effusion and edema  - Off inotropes given tacyhcardia. TTE shows EF of 15%   - Give fluids judiciously given low EF in setting of likely sepsis  - Unclear medical regimen opt. Per [facility] pharmacy ( and [facility]) pt has not picked up Torsemide 20 or Metoprolol 100 since November.   - Hold ACE/Metoprolol in setting of sepsis    #AFIB  - Unclear if hx of Afib. Per pharmacy pt on coumadin. Pt currently in Sinus rhythm.   - F/u PT/PTT/INR, currently elevated. Pt s/p FFP (3/13,3/14) and Vit K (3/13) for thoracocentesis and HD catheter placement.      #CAD- Hx of MI s/p AICD vs PPI- Per pharmacy pt has not picked up Plavix since November  - Obtain collateral from outpatient cardiologist     # LE Edema   - LE edema with chronic venous stasis.   - Duplex does not show DVT    PULMONARY   #Acute resp failure   - pt with R loculated plural effusion noted to have increased work of breathing. Likely 2/2 fluid overload, FLuid studies consistent with exudative effusion.   - s/p thoracentesis on 3/3 with R chest tube placement. No drainage overnight. Will plan on removing tomorrow 3/15   - On 4L NC saturating well.   - c/w Solucortef 50 q6h 3/12- given worsening condition and recent hx of Steroid administration 2 months ago  - Plan to keep Hb 9-10. s/p  pRBC transfusion on 3/12  - Monitor resp status.     # Possible Pnuemothrorax- due to trapped lung   - Not seen on recent Xrays. Possibly skin fold   - Pt HD stable. Will monitor     #RENAL   #GILMER- 2/2 ischemic ATN  -Unknown baseline Cr presenting with Cr 3.93 with metabolic derangements.   - Likely 2/2 Sepsis, low intravascular volume and CHF  - Trend BUN and Cr.  - Renal team on board, HD catheter placed on 3/13. CRRT started. Will attempt fluid removal today but pt continues to be hypotensive requiring vasopressors   - CT abdomen and pelvis without acute obstruction.   - retroperitoneal ultrasound showing medical renal disease.     #Hyperkalemia   - PT with elevated K to 5.7 on admission,  no EKG changes   - Continue telemetry monitoring  - Trend K   - Can given Kayexalate if persists   - Plan for dialysis this afternoon     #Metabolic acidosis   - 2/2 Lactate, starvation ketoacidosis and uremia   - Lactate downtrending however still elevated.   - not trending   - Bicarb 650 TID     #ID   - septic shock likely due to L LE cellulitis.  - Pt's condition improving on Vanc/zosyn.   - repeat LE CT does not show abscess or gas.   - Given recent hospitalization, c/w Vanc + Zosyn (renally dosed) 3/14 day 5  - R lung Effusion likely from overload. s/p thoracentesis with improvement in resp status   -  HIV negative.   - Vascular surgery on board. Recommending Amputation as an option if pt continues to be unstable.   - TTE with no vegetation. Low concern for AICD infection. Will monitor and consider RUKHSANA if continues to be unstable.     #GI   - pt wit Elevated Bilrirubin and Alk phos. Abd exam benign  - CT abdomen/pelvis consistent with cholelithiasis and ascites.     #HEME  # elevated INR. Unclear etiology. Pt on coumadin? - Continue to trend coags  - Given Vit K and FFP3/12. FFP 3/13     #ANemia  - Stable at 8-9. Anemia of chronic diosease.  -s/p 1pRBC on 3/12   - WIll keep Hb around 9-10     #ENDOCRINE   - Elevated Glucose. Anion gap elevated however likely 2/2 Uremia and Lactate. Ketones in urine likely 2/2 Starvation.   - S/p insulin drip  - Currently on MISS. Given NPH 4 in AM.   - Monitor blood glucose   - A1C 8.6    F: No IVF   E: Replete K<4 Mg<2, caution in setting of acute renal failure  N: Renal Diet     Lines:  R HD catheter (3/13). R chest tube (3/12), L IJ (3/12). R A line (3/12), barnes   Drips: Levophed, Vasopressin     Full code   Dispo: MICU  Ppx: [facility]

## 2018-03-14 NOTE — PROGRESS NOTE ADULT - SUBJECTIVE AND OBJECTIVE BOX
INTERVAL HPI/OVERNIGHT EVENTS:    SUBJECTIVE: Patient seen and examined at bedside.    OBJECTIVE:    VITAL SIGNS:  ICU Vital Signs Last 24 Hrs  T(C): 35.6 (14 Mar 2018 02:01), Max: 37 (13 Mar 2018 06:21)  T(F): 96 (14 Mar 2018 02:01), Max: 98.6 (13 Mar 2018 06:21)  HR: 88 (14 Mar 2018 05:00) (84 - 124)  BP: 92/72 (14 Mar 2018 05:00) (83/67 - 112/92)  BP(mean): 82 (14 Mar 2018 05:00) (72 - 100)  ABP: 98/66 (14 Mar 2018 05:00) (88/68 - 120/86)  ABP(mean): 76 (14 Mar 2018 05:00) (72 - 96)  RR: 31 (14 Mar 2018 05:00) (17 - 34)  SpO2: 100% (14 Mar 2018 05:00) (75% - 100%)        03-12 @ 07:01  -  03-13 @ 07:00  --------------------------------------------------------  IN: 1094.4 mL / OUT: 2194 mL / NET: -1099.6 mL    03-13 @ 07:01  -  03-14 @ 06:09  --------------------------------------------------------  IN: 809 mL / OUT: 319 mL / NET: 490 mL      CAPILLARY BLOOD GLUCOSE      POCT Blood Glucose.: 148 mg/dL (13 Mar 2018 22:44)      PHYSICAL EXAM:    General: NAD  HEENT: NC/AT; PERRL, clear conjunctiva  Neck: supple  Respiratory: CTA b/l  Cardiovascular: +S1/S2; RRR  Abdomen: soft, NT/ND; +BS x4  Extremities:  no LE edema  Vascular: WWP, 2+ peripheral pulses b/l;  Skin: normal color and turgor; no rash  Neurological: A&Ox3, move all extremities. CN II-XII intact    MEDICATIONS:  MEDICATIONS  (STANDING):  bisacodyl 5 milliGRAM(s) Oral daily  dextrose 5%. 1000 milliLiter(s) (50 mL/Hr) IV Continuous <Continuous>  dextrose 50% Injectable 12.5 Gram(s) IV Push once  dextrose 50% Injectable 25 Gram(s) IV Push once  dextrose 50% Injectable 25 Gram(s) IV Push once  folic acid 1 milliGRAM(s) Oral daily  hydrocortisone sodium succinate Injectable 50 milliGRAM(s) IV Push every 6 hours  insulin glargine Injectable (LANTUS) 6 Unit(s) SubCutaneous at bedtime  insulin lispro (HumaLOG) corrective regimen sliding scale   SubCutaneous Before meals and at bedtime  multivitamin 1 Tablet(s) Oral daily  norepinephrine Infusion 0.01 MICROgram(s)/kG/Min (0.75 mL/Hr) IV Continuous <Continuous>  piperacillin/tazobactam IVPB. 2.25 Gram(s) IV Intermittent every 6 hours  PureFlow Dialysate RFP-400 (K 2 / Ca 3) 5000 milliLiter(s) (2000 mL/Hr) CRRT <Continuous>  sodium bicarbonate 650 milliGRAM(s) Oral three times a day  thiamine 100 milliGRAM(s) Oral daily  vasopressin Infusion 0.04 Unit(s)/Min (2.4 mL/Hr) IV Continuous <Continuous>    MEDICATIONS  (PRN):  acetaminophen   Tablet. 650 milliGRAM(s) Oral every 6 hours PRN Moderate Pain (4 - 6)  benzocaine 15 mG/menthol 3.6 mG Lozenge 1 Lozenge Oral every 4 hours PRN Sore Throat  dextrose Gel 1 Dose(s) Oral once PRN Blood Glucose LESS THAN 70 milliGRAM(s)/deciliter  glucagon  Injectable 1 milliGRAM(s) IntraMuscular once PRN Glucose LESS THAN 70 milligrams/deciliter      ALLERGIES:  Allergies    No Known Allergies    Intolerances        LABS:                        8.5    40.4  )-----------( 204      ( 14 Mar 2018 01:23 )             26.2     03-14    137  |  102  |  83<H>  ----------------------------<  151<H>  4.9   |  17<L>  |  3.60<H>    Ca    8.5      14 Mar 2018 01:23  Phos  4.4     03-14  Mg     2.1     03-13    TPro  7.3  /  Alb  2.5<L>  /  TBili  4.4<H>  /  DBili  2.4<H>  /  AST  33  /  ALT  21  /  AlkPhos  211<H>  03-13    PT/INR - ( 13 Mar 2018 06:30 )   PT: 29.0 sec;   INR: 2.56          PTT - ( 13 Mar 2018 06:30 )  PTT:38.8 sec      RADIOLOGY & ADDITIONAL TESTS: Reviewed.

## 2018-03-14 NOTE — PROGRESS NOTE ADULT - ATTENDING COMMENTS
Case reviewed and discussed with renal fellow at length.  Agree with above.  We will continue to monitor the patient closely with you.  Please feel free to call us with any questions or concerns.

## 2018-03-14 NOTE — PROGRESS NOTE ADULT - ATTENDING COMMENTS
Patient seen and examined with house-staff during bedside rounds.  Resident note read, including vitals, physical findings, laboratory data, and radiological reports.   Revisions included below.  Direct personal management at bed side and extensive interpretation of the data.  Plan was outlined and discussed in details with the housestaff.  Decision making of high complexity  Action taken for acute disease activity to reflect the level of care provided:  - medication reconciliation  - review laboratory data  - evaluated the pulmonary status and improved with fluid removed  - evaluated the need to keep the CT drainage  - Weaned off Vasopressin  - Continue antibiotics and to redose vancomycin  - WBC decreased with resolving septic shock  - Poor oral intake  -  Evaluated the BS and insulin dosage  -

## 2018-03-14 NOTE — PROGRESS NOTE ADULT - SUBJECTIVE AND OBJECTIVE BOX
Nephrology Fellow's addnedum note:    Patient still remains lethargic with volume overload state with bilateral leg and abdominal and thigh edema. Mental status improving. IV pressors requirement decreased and just started on IV dobutamine for now for inotropic support. now on minimal levophed at present. Will try UF 50cc/hr for now with IV albumin for now as tolerated. Will continue to follow. Case discuss with .    Bhaskar Abreu MD  Nephrology fellow,

## 2018-03-14 NOTE — PROGRESS NOTE ADULT - PROBLEM SELECTOR PLAN 2
Mild hyperkalemia now resolved likely due to clearance with CVVHD and po sodium bicarbonate.  Low K/Low phos/renal diet.  Continue sodium bicarbonate 650mg po tid for now  Monitor BMP every 6 hrly  Continue CVVHD for clearance only today.

## 2018-03-14 NOTE — PROGRESS NOTE ADULT - PROBLEM SELECTOR PLAN 3
Acute on chronic systolic CHF with LVEF 15 % and severely low blood pressure  Plan per cardiology/primary ICU team for inotropic support and IV pressors for now.  Fluid restriction to <1L/day with concentration of IV drips if possible  Daily weight  Strict I/o monitoring.  Consider Cardiology evaluation for severe cardiomyopathy with fluid overload  S/p right chest tube drain with 200cc transudative pleural effusion.

## 2018-03-15 LAB
ALBUMIN SERPL ELPH-MCNC: 3 G/DL — LOW (ref 3.3–5)
ALP SERPL-CCNC: 138 U/L — HIGH (ref 40–120)
ALT FLD-CCNC: 21 U/L — SIGNIFICANT CHANGE UP (ref 10–45)
ANION GAP SERPL CALC-SCNC: 17 MMOL/L — SIGNIFICANT CHANGE UP (ref 5–17)
ANION GAP SERPL CALC-SCNC: 17 MMOL/L — SIGNIFICANT CHANGE UP (ref 5–17)
ANION GAP SERPL CALC-SCNC: 19 MMOL/L — HIGH (ref 5–17)
ANION GAP SERPL CALC-SCNC: 19 MMOL/L — HIGH (ref 5–17)
APPEARANCE UR: (no result)
APTT BLD: 48 SEC — HIGH (ref 27.5–37.4)
AST SERPL-CCNC: 27 U/L — SIGNIFICANT CHANGE UP (ref 10–40)
BASE EXCESS BLDMV CALC-SCNC: -2.8 MMOL/L — SIGNIFICANT CHANGE UP
BILIRUB DIRECT SERPL-MCNC: 2.5 MG/DL — HIGH (ref 0–0.2)
BILIRUB INDIRECT FLD-MCNC: 2.4 MG/DL — HIGH (ref 0.2–1)
BILIRUB SERPL-MCNC: 4.9 MG/DL — HIGH (ref 0.2–1.2)
BILIRUB UR-MCNC: (no result)
BUN SERPL-MCNC: 44 MG/DL — HIGH (ref 7–23)
BUN SERPL-MCNC: 49 MG/DL — HIGH (ref 7–23)
BUN SERPL-MCNC: 50 MG/DL — HIGH (ref 7–23)
BUN SERPL-MCNC: 59 MG/DL — HIGH (ref 7–23)
C3 SERPL-MCNC: 52 MG/DL — LOW (ref 80–180)
C4 SERPL-MCNC: 14 MG/DL — SIGNIFICANT CHANGE UP (ref 10–45)
CALCIUM SERPL-MCNC: 8.2 MG/DL — LOW (ref 8.4–10.5)
CALCIUM SERPL-MCNC: 8.4 MG/DL — SIGNIFICANT CHANGE UP (ref 8.4–10.5)
CALCIUM SERPL-MCNC: 8.6 MG/DL — SIGNIFICANT CHANGE UP (ref 8.4–10.5)
CALCIUM SERPL-MCNC: 8.6 MG/DL — SIGNIFICANT CHANGE UP (ref 8.4–10.5)
CHLORIDE SERPL-SCNC: 97 MMOL/L — SIGNIFICANT CHANGE UP (ref 96–108)
CHLORIDE SERPL-SCNC: 98 MMOL/L — SIGNIFICANT CHANGE UP (ref 96–108)
CO2 SERPL-SCNC: 19 MMOL/L — LOW (ref 22–31)
CO2 SERPL-SCNC: 19 MMOL/L — LOW (ref 22–31)
CO2 SERPL-SCNC: 20 MMOL/L — LOW (ref 22–31)
CO2 SERPL-SCNC: 20 MMOL/L — LOW (ref 22–31)
COLOR SPEC: YELLOW — SIGNIFICANT CHANGE UP
CREAT ?TM UR-MCNC: 217 MG/DL — SIGNIFICANT CHANGE UP
CREAT SERPL-MCNC: 1.93 MG/DL — HIGH (ref 0.5–1.3)
CREAT SERPL-MCNC: 2.09 MG/DL — HIGH (ref 0.5–1.3)
CREAT SERPL-MCNC: 2.2 MG/DL — HIGH (ref 0.5–1.3)
CREAT SERPL-MCNC: 2.4 MG/DL — HIGH (ref 0.5–1.3)
CULTURE RESULTS: NO GROWTH — SIGNIFICANT CHANGE UP
CULTURE RESULTS: SIGNIFICANT CHANGE UP
CULTURE RESULTS: SIGNIFICANT CHANGE UP
DIFF PNL FLD: (no result)
GAS PNL BLDMV: SIGNIFICANT CHANGE UP
GAS PNL BLDV: SIGNIFICANT CHANGE UP
GLUCOSE BLDC GLUCOMTR-MCNC: 134 MG/DL — HIGH (ref 70–99)
GLUCOSE BLDC GLUCOMTR-MCNC: 140 MG/DL — HIGH (ref 70–99)
GLUCOSE BLDC GLUCOMTR-MCNC: 179 MG/DL — HIGH (ref 70–99)
GLUCOSE SERPL-MCNC: 146 MG/DL — HIGH (ref 70–99)
GLUCOSE SERPL-MCNC: 150 MG/DL — HIGH (ref 70–99)
GLUCOSE SERPL-MCNC: 154 MG/DL — HIGH (ref 70–99)
GLUCOSE SERPL-MCNC: 172 MG/DL — HIGH (ref 70–99)
GLUCOSE UR QL: NEGATIVE — SIGNIFICANT CHANGE UP
HAPTOGLOB SERPL-MCNC: 77 MG/DL — SIGNIFICANT CHANGE UP (ref 34–200)
HCO3 BLDMV-SCNC: 22 MMOL/L — SIGNIFICANT CHANGE UP
HCT VFR BLD CALC: 25.1 % — LOW (ref 39–50)
HCT VFR BLD CALC: 26.3 % — LOW (ref 39–50)
HCT VFR BLD CALC: 28.2 % — LOW (ref 39–50)
HGB BLD-MCNC: 8 G/DL — LOW (ref 13–17)
HGB BLD-MCNC: 8.2 G/DL — LOW (ref 13–17)
HGB BLD-MCNC: 8.8 G/DL — LOW (ref 13–17)
INR BLD: 2.02 — HIGH (ref 0.88–1.16)
KAPPA LC SER QL IFE: 18.1 MG/DL — HIGH (ref 0.33–1.94)
KAPPA/LAMBDA FREE LIGHT CHAIN RATIO, SERUM: 1.11 RATIO — SIGNIFICANT CHANGE UP (ref 0.26–1.65)
KETONES UR-MCNC: (no result) MG/DL
LACTATE SERPL-SCNC: 1.4 MMOL/L — SIGNIFICANT CHANGE UP (ref 0.5–2)
LAMBDA LC SER QL IFE: 16.3 MG/DL — HIGH (ref 0.57–2.63)
LDH SERPL L TO P-CCNC: 290 U/L — HIGH (ref 50–242)
LEUKOCYTE ESTERASE UR-ACNC: (no result)
MAGNESIUM SERPL-MCNC: 1.7 MG/DL — SIGNIFICANT CHANGE UP (ref 1.6–2.6)
MAGNESIUM SERPL-MCNC: 1.8 MG/DL — SIGNIFICANT CHANGE UP (ref 1.6–2.6)
MAGNESIUM SERPL-MCNC: 1.9 MG/DL — SIGNIFICANT CHANGE UP (ref 1.6–2.6)
MAGNESIUM SERPL-MCNC: 1.9 MG/DL — SIGNIFICANT CHANGE UP (ref 1.6–2.6)
MCHC RBC-ENTMCNC: 28.4 PG — SIGNIFICANT CHANGE UP (ref 27–34)
MCHC RBC-ENTMCNC: 28.7 PG — SIGNIFICANT CHANGE UP (ref 27–34)
MCHC RBC-ENTMCNC: 29.2 PG — SIGNIFICANT CHANGE UP (ref 27–34)
MCHC RBC-ENTMCNC: 31.2 G/DL — LOW (ref 32–36)
MCHC RBC-ENTMCNC: 31.2 G/DL — LOW (ref 32–36)
MCHC RBC-ENTMCNC: 31.9 G/DL — LOW (ref 32–36)
MCV RBC AUTO: 91 FL — SIGNIFICANT CHANGE UP (ref 80–100)
MCV RBC AUTO: 91.6 FL — SIGNIFICANT CHANGE UP (ref 80–100)
MCV RBC AUTO: 92 FL — SIGNIFICANT CHANGE UP (ref 80–100)
NITRITE UR-MCNC: NEGATIVE — SIGNIFICANT CHANGE UP
O2 CT VFR BLD CALC: 37 MMHG — SIGNIFICANT CHANGE UP (ref 30–65)
PCO2 BLDMV: 37 MMHG — SIGNIFICANT CHANGE UP (ref 30–65)
PH BLDMV: 7.39 — SIGNIFICANT CHANGE UP (ref 7.2–7.45)
PH UR: 5 — SIGNIFICANT CHANGE UP (ref 5–8)
PHOSPHATE SERPL-MCNC: 3.4 MG/DL — SIGNIFICANT CHANGE UP (ref 2.5–4.5)
PHOSPHATE SERPL-MCNC: 3.7 MG/DL — SIGNIFICANT CHANGE UP (ref 2.5–4.5)
PHOSPHATE SERPL-MCNC: 3.8 MG/DL — SIGNIFICANT CHANGE UP (ref 2.5–4.5)
PHOSPHATE SERPL-MCNC: 4 MG/DL — SIGNIFICANT CHANGE UP (ref 2.5–4.5)
PLATELET # BLD AUTO: 148 K/UL — LOW (ref 150–400)
PLATELET # BLD AUTO: 159 K/UL — SIGNIFICANT CHANGE UP (ref 150–400)
PLATELET # BLD AUTO: 203 K/UL — SIGNIFICANT CHANGE UP (ref 150–400)
POTASSIUM SERPL-MCNC: 3.6 MMOL/L — SIGNIFICANT CHANGE UP (ref 3.5–5.3)
POTASSIUM SERPL-MCNC: 3.8 MMOL/L — SIGNIFICANT CHANGE UP (ref 3.5–5.3)
POTASSIUM SERPL-MCNC: 4 MMOL/L — SIGNIFICANT CHANGE UP (ref 3.5–5.3)
POTASSIUM SERPL-MCNC: 4 MMOL/L — SIGNIFICANT CHANGE UP (ref 3.5–5.3)
POTASSIUM SERPL-SCNC: 3.6 MMOL/L — SIGNIFICANT CHANGE UP (ref 3.5–5.3)
POTASSIUM SERPL-SCNC: 3.8 MMOL/L — SIGNIFICANT CHANGE UP (ref 3.5–5.3)
POTASSIUM SERPL-SCNC: 4 MMOL/L — SIGNIFICANT CHANGE UP (ref 3.5–5.3)
POTASSIUM SERPL-SCNC: 4 MMOL/L — SIGNIFICANT CHANGE UP (ref 3.5–5.3)
PROT ?TM UR-MCNC: 187 MG/DL — HIGH (ref 0–12)
PROT SERPL-MCNC: 7.3 G/DL — SIGNIFICANT CHANGE UP (ref 6–8.3)
PROT UR-MCNC: 100 MG/DL
PROT/CREAT UR-RTO: 0.9 RATIO — HIGH (ref 0–0.2)
PROTHROM AB SERPL-ACNC: 22.8 SEC — HIGH (ref 9.8–12.7)
RBC # BLD: 2.74 M/UL — LOW (ref 4.2–5.8)
RBC # BLD: 2.86 M/UL — LOW (ref 4.2–5.8)
RBC # BLD: 3.1 M/UL — LOW (ref 4.2–5.8)
RBC # FLD: 15.3 % — SIGNIFICANT CHANGE UP (ref 10.3–16.9)
RBC # FLD: 15.4 % — SIGNIFICANT CHANGE UP (ref 10.3–16.9)
RBC # FLD: 15.4 % — SIGNIFICANT CHANGE UP (ref 10.3–16.9)
SAO2 % BLDMV: 60 % — SIGNIFICANT CHANGE UP
SODIUM SERPL-SCNC: 133 MMOL/L — LOW (ref 135–145)
SODIUM SERPL-SCNC: 135 MMOL/L — SIGNIFICANT CHANGE UP (ref 135–145)
SODIUM SERPL-SCNC: 136 MMOL/L — SIGNIFICANT CHANGE UP (ref 135–145)
SODIUM SERPL-SCNC: 137 MMOL/L — SIGNIFICANT CHANGE UP (ref 135–145)
SP GR SPEC: >=1.03 — SIGNIFICANT CHANGE UP (ref 1–1.03)
SPECIMEN SOURCE: SIGNIFICANT CHANGE UP
T PALLIDUM AB TITR SER: NEGATIVE — SIGNIFICANT CHANGE UP
UROBILINOGEN FLD QL: 1 E.U./DL — SIGNIFICANT CHANGE UP
VANCOMYCIN TROUGH SERPL-MCNC: 13 UG/ML — SIGNIFICANT CHANGE UP (ref 10–20)
WBC # BLD: 28.9 K/UL — HIGH (ref 3.8–10.5)
WBC # BLD: 29.3 K/UL — HIGH (ref 3.8–10.5)
WBC # BLD: 36.4 K/UL — HIGH (ref 3.8–10.5)
WBC # FLD AUTO: 28.9 K/UL — HIGH (ref 3.8–10.5)
WBC # FLD AUTO: 29.3 K/UL — HIGH (ref 3.8–10.5)
WBC # FLD AUTO: 36.4 K/UL — HIGH (ref 3.8–10.5)

## 2018-03-15 PROCEDURE — 99233 SBSQ HOSP IP/OBS HIGH 50: CPT | Mod: GC

## 2018-03-15 PROCEDURE — 71045 X-RAY EXAM CHEST 1 VIEW: CPT | Mod: 26

## 2018-03-15 RX ORDER — DOBUTAMINE HCL 250MG/20ML
4 VIAL (ML) INTRAVENOUS
Qty: 500 | Refills: 0 | Status: DISCONTINUED | OUTPATIENT
Start: 2018-03-15 | End: 2018-03-19

## 2018-03-15 RX ORDER — VANCOMYCIN HCL 1 G
1250 VIAL (EA) INTRAVENOUS ONCE
Qty: 0 | Refills: 0 | Status: COMPLETED | OUTPATIENT
Start: 2018-03-15 | End: 2018-03-15

## 2018-03-15 RX ADMIN — PIPERACILLIN AND TAZOBACTAM 200 GRAM(S): 4; .5 INJECTION, POWDER, LYOPHILIZED, FOR SOLUTION INTRAVENOUS at 11:52

## 2018-03-15 RX ADMIN — Medication 166.67 MILLIGRAM(S): at 14:51

## 2018-03-15 RX ADMIN — PIPERACILLIN AND TAZOBACTAM 200 GRAM(S): 4; .5 INJECTION, POWDER, LYOPHILIZED, FOR SOLUTION INTRAVENOUS at 17:42

## 2018-03-15 RX ADMIN — Medication 50 MILLILITER(S): at 06:10

## 2018-03-15 RX ADMIN — Medication 50 MILLIGRAM(S): at 17:42

## 2018-03-15 RX ADMIN — INSULIN GLARGINE 6 UNIT(S): 100 INJECTION, SOLUTION SUBCUTANEOUS at 22:48

## 2018-03-15 RX ADMIN — Medication 50 MILLIGRAM(S): at 06:12

## 2018-03-15 RX ADMIN — Medication 50 MILLILITER(S): at 00:55

## 2018-03-15 RX ADMIN — Medication 2: at 22:49

## 2018-03-15 RX ADMIN — Medication 1 TABLET(S): at 11:51

## 2018-03-15 RX ADMIN — PIPERACILLIN AND TAZOBACTAM 200 GRAM(S): 4; .5 INJECTION, POWDER, LYOPHILIZED, FOR SOLUTION INTRAVENOUS at 06:11

## 2018-03-15 RX ADMIN — Medication 650 MILLIGRAM(S): at 06:10

## 2018-03-15 RX ADMIN — Medication 50 MILLILITER(S): at 11:54

## 2018-03-15 RX ADMIN — Medication 50 MILLIGRAM(S): at 11:52

## 2018-03-15 RX ADMIN — Medication 50 MILLILITER(S): at 17:42

## 2018-03-15 NOTE — PROGRESS NOTE ADULT - ASSESSMENT
ASSESSMENT: 63M PMH HFrEF 10-15% (ischemic), MI, s/p AICD vs PPM, possible Afib, HTN, DM2 on insulin, possible CKD, and gout, who presents with a chief complaint of generalized weakness now in septic shock likely 2/2 LE cellulitis    NEURO  #Toxic metabolic encephalopathy  - Lethargic. Likely 2/2 severe sepsis. wIll continue monitoring mental status   - Other possible etiologies include uremic encephalopathy, intoxication.   - Will observe on CRRT and assess status after uremia resolution.      CARDIOVASCULAR   # HYPOTENSION   - 2/2 shock -Septic and Cardiogenic.   - Pt persistently hypotensive this admission with high Levophed requirements. Given low EF and low mixed O2 saturation, started on Dobutamine yesterday without significant improvement in VBG  - Treating cellulitis however continues to have persistently high WBCs, will look for other source. OBtain Gallium scan. COnsult ID   - Continues to have elevated Lactate. Likely decrease in clearance as well inadequate source control.   - Maintain MAP >75 for renal perfusion.  - CHF with severely reduced EF 15%, caution with fluids. Per renal recs , can give IV albumin for fluids.  - GIving CRRT in the setting of hypotension.     # CHF exacerbation- Hx of CHF with EF 10-15% per pt 2/2 ischemic cardiomyopathy s/p AICD as indicated by CXR   - Elevated BNP on admission with R sided effusion and edema  - TTE shows EF of 15%   - Cardiac shock major component of hypotension c/w Dobutamine.   - Give fluids judiciously given low EF in setting of likely sepsis  - Unclear medical regimen opt. Per [facility] pharmacy ( and [place]) pt has not picked up Torsemide 20 or Metoprolol 100 since November.   - Hold ACE/Metoprolol in setting of sepsis    #AFIB  - Unclear if hx of Afib. Per pharmacy pt on coumadin. Pt currently in Sinus rhythm however had episodes of A fib per records.   - F/u PT/PTT/INR, currently elevated. Pt s/p FFP (3/13,3/14) and Vit K (3/13) for thoracocentesis and HD catheter placement.      #CAD- Hx of MI s/p AICD vs PPI- Per pharmacy pt has not picked up Plavix since November  - Obtain collateral from outpatient cardiologist     # LE Edema   - LE edema with chronic venous stasis.   - Duplex does not show DVT    PULMONARY   #Acute resp failure   - pt with R loculated plural effusion noted to have increased work of breathing. Likely 2/2 fluid overload, FLuid studies consistent with exudative effusion.   - s/p thoracentesis on 3/3 with R chest tube placement. No drainage overnight. Will plan on removing today   - On 4L NC saturating well.   - c/w Solucortef 50 q6h 3/12- given worsening condition and recent hx of Steroid administration 2 months ago  - Plan to keep Hb 9-10. s/p  pRBC transfusion on 3/12  - Monitor resp status.     # Possible Pneumothorax due to trapped lung   - Not seen on recent Xrays. Possibly skin fold   - Pt HD stable. Will monitor     #RENAL   #GILMER- 2/2 ischemic ATN  -Unknown baseline Cr presenting with Cr 3.93 with metabolic derangements.   - Likely 2/2 Sepsis, low intravascular volume and CHF  - Trend BUN and Cr.  - Renal team on board, HD catheter placed on 3/13. CRRT started. UF 50cc/hours  - CT abdomen and pelvis without acute obstruction.   - retroperitoneal ultrasound showing medical renal disease.     #Hyperkalemia   - PT with elevated K to 5.7 on admission,  no EKG changes   - Continue telemetry monitoring  - Trend K   - Can given Kayexalate if persists   - Plan for dialysis this afternoon     #Metabolic acidosis   - 2/2 Lactate, starvation ketoacidosis and uremia   - Lactate downtrending however still elevated.   - wIll recheck today   - not trending   - Bicarb 650 TID     #ID   - septic shock likely due to L LE cellulitis however continues to have persistently elevated WBCs not fully explained by steroids. Given worsening status, will obtain Gallium scan to look for other status.   - TTE with no vegetations. Pt not stable for RUKHSANA   - c/w Vanc/zosyn.   - repeat LE CT does not show abscess or gas.   - Given recent hospitalization, c/w Vanc + Zosyn (renally dosed) 3/15 day 6  - R lung Effusion likely from overload. s/p thoracentesis with improvement in resp status   -  HIV negative.   - Vascular surgery on board. Recommending Amputation as an option if pt continues to be unstable.       #GI   - pt wit Elevated Bilrirubin and Alk phos. Abd exam benign  - CT abdomen/pelvis consistent with cholelithiasis and ascites.     #HEME  # elevated INR. Unclear etiology. Pt on coumadin? - Continue to trend coags  - Given Vit K and FFP3/12. FFP 3/13     #ANemia  - Stable at 8-9. Anemia of chronic disease.  -s/p 1pRBC on 3/12   - WIll keep Hb around 9-10     #ENDOCRINE   - Elevated Glucose. Anion gap elevated however likely 2/2 Uremia and Lactate. Ketones in urine likely 2/2 Starvation.   - S/p insulin drip  - Currently on MISS. Given NPH 4 in AM.   - Monitor blood glucose   - A1C 8.6    F: No IVF   E: Replete K<4 Mg<2, caution in setting of acute renal failure  N: Renal Diet     Lines:  R HD catheter (3/13). R chest tube (3/12), L IJ (3/12). R A line (3/12), barnes   Drips: Levophed, Vasopressin     Full code   Dispo: MICU  Ppx: SQH

## 2018-03-15 NOTE — PROGRESS NOTE ADULT - SUBJECTIVE AND OBJECTIVE BOX
Patient seen at bedside in the MICU with attending. No issued overnight. Patient was more lethargic this morning, but alert and oriented when aroused. Patient reports no worsening of lower extremity pain; pain well-controlled. Denies fevers, chill, n/v.     PMH:  Type 2 diabetes mellitus with diabetic peripheral angiopathy without gangrene, with long-term curren  Essential hypertension, benign  Gout  Pacemaker  Chronic systolic heart failure  Myocardial infarction      Medication:   piperacillin/tazobactam IVPB. 3.375  DOBUTamine Infusion 1  norepinephrine Infusion 0.01  piperacillin/tazobactam IVPB. 3.375        Vital Signs Last 24 Hrs  T(C): 34.6 (15 Mar 2018 09:00), Max: 36.2 (14 Mar 2018 15:50)  T(F): 94.3 (15 Mar 2018 09:00), Max: 97.1 (14 Mar 2018 15:50)  HR: 92 (15 Mar 2018 10:00) (86 - 118)  BP: 85/64 (15 Mar 2018 10:00) (79/65 - 103/79)  BP(mean): 69 (15 Mar 2018 10:00) (67 - 96)  RR: 19 (15 Mar 2018 10:00) (16 - 27)  SpO2: 98% (15 Mar 2018 10:00) (92% - 100%)  I&O's Summary    14 Mar 2018 07:01  -  15 Mar 2018 07:00  --------------------------------------------------------  IN: 923.2 mL / OUT: 40 mL / NET: 883.2 mL    15 Mar 2018 07:01  -  15 Mar 2018 10:47  --------------------------------------------------------  IN: 13.2 mL / OUT: 5 mL / NET: 8.2 mL        Physical Exam:  General: NAD, AAOx3  CV: normotensive on pressors  Extremities: +2 pitting edema of the leg and feet bilaterally, multiple small superficial ulcers over the shin b/l with minimal serous drainage  - drainage from dorsum of the R foot swabbed and sent for culture  - No fluctuance appreciated. Unable to appreciate PT bilaterally.   - Biphasic to monophasic DP        LABS:                        8.8    36.4  )-----------( 203      ( 15 Mar 2018 01:24 )             28.2     03-15    137  |  98  |  50<H>  ----------------------------<  146<H>  4.0   |  20<L>  |  2.20<H>    Ca    8.6      15 Mar 2018 07:20  Phos  4.0     03-15  Mg     1.9     03-15    TPro  6.5  /  Alb  2.1<L>  /  TBili  3.6<H>  /  DBili  x   /  AST  27  /  ALT  20  /  AlkPhos  150<H>  03-14    PT/INR - ( 14 Mar 2018 07:05 )   PT: 26.6 sec;   INR: 2.35          PTT - ( 14 Mar 2018 07:05 )  PTT:40.6 sec    Radiology and Additional Studies:

## 2018-03-15 NOTE — PROGRESS NOTE ADULT - PROBLEM SELECTOR PLAN 3
Increased aniongap metabolic acidosis likely due to underlying renal disease  No indication for IV bicarbonate at present.  Continue sodium bicarbonate 650mg po tid for now  Continue CVVHD for now.

## 2018-03-15 NOTE — PROGRESS NOTE ADULT - SUBJECTIVE AND OBJECTIVE BOX
INTERVAL HPI/OVERNIGHT EVENTS: Started UF @55cc/hour. Given Low VBG saturation, started on DObutamine. 11 beats Vtach    SUBJECTIVE: Patient seen and examined at bedside. Patient fatigued with difficulty speaking.      OBJECTIVE:    VITAL SIGNS:  ICU Vital Signs Last 24 Hrs  T(C): 34.6 (15 Mar 2018 09:00), Max: 36.2 (14 Mar 2018 15:50)  T(F): 94.3 (15 Mar 2018 09:00), Max: 97.1 (14 Mar 2018 15:50)  HR: 90 (15 Mar 2018 11:00) (86 - 118)  BP: 85/64 (15 Mar 2018 10:00) (79/65 - 103/79)  BP(mean): 69 (15 Mar 2018 10:00) (67 - 96)  ABP: 98/62 (15 Mar 2018 11:00) (90/58 - 118/80)  ABP(mean): 74 (15 Mar 2018 11:00) (70 - 92)  RR: 20 (15 Mar 2018 11:00) (16 - 27)  SpO2: 94% (15 Mar 2018 11:00) (92% - 100%)        03-14 @ 07:01  -  03-15 @ 07:00  --------------------------------------------------------  IN: 923.2 mL / OUT: 544 mL / NET: 379.2 mL    03-15 @ 07:01  -  03-15 @ 11:35  --------------------------------------------------------  IN: 18.6 mL / OUT: 198 mL / NET: -179.4 mL      CAPILLARY BLOOD GLUCOSE      POCT Blood Glucose.: 140 mg/dL (15 Mar 2018 11:32)      PHYSICAL EXAM:    General: lethargic, falls asleep during the conversation, unable to vocalize   HEENT: NC/AT, PERRL  Neck: supple  Respiratory: On RA. RLL crackles. No neck accessory muscle use noted. R chest tube with no output.   Cardiac: Tachycardic, +S1/S2, no murmurs, rubs, or gallops  Gastrointestinal: soft, NT/ND; no rebound or guarding; +BS  Genitourinary: +Jose, Testicular edema   Extremities: Warm to touch. Bilateral chronic venous stasis areas of skin breaks. R ankle decrease in erythema.  1+ LE edema b/l. 1+ DP pulses b/l   Musculoskeletal: NROM x4; no joint swelling, tenderness or erythema    MEDICATIONS:  MEDICATIONS  (STANDING):  albumin human 25% IVPB 50 milliLiter(s) IV Intermittent every 6 hours  bisacodyl 5 milliGRAM(s) Oral daily  dextrose 5%. 1000 milliLiter(s) (50 mL/Hr) IV Continuous <Continuous>  dextrose 50% Injectable 12.5 Gram(s) IV Push once  dextrose 50% Injectable 25 Gram(s) IV Push once  dextrose 50% Injectable 25 Gram(s) IV Push once  DOBUTamine Infusion 1 MICROgram(s)/kG/Min (2.4 mL/Hr) IV Continuous <Continuous>  folic acid 1 milliGRAM(s) Oral daily  hydrocortisone sodium succinate Injectable 50 milliGRAM(s) IV Push every 6 hours  insulin glargine Injectable (LANTUS) 6 Unit(s) SubCutaneous at bedtime  insulin lispro (HumaLOG) corrective regimen sliding scale   SubCutaneous Before meals and at bedtime  multivitamin 1 Tablet(s) Oral daily  norepinephrine Infusion 0.01 MICROgram(s)/kG/Min (0.75 mL/Hr) IV Continuous <Continuous>  piperacillin/tazobactam IVPB. 3.375 Gram(s) IV Intermittent every 6 hours  PureFlow Dialysate RFP-400 (K 2 / Ca 3) 5000 milliLiter(s) (2000 mL/Hr) CRRT <Continuous>  thiamine 100 milliGRAM(s) Oral daily    MEDICATIONS  (PRN):  acetaminophen   Tablet. 650 milliGRAM(s) Oral every 6 hours PRN Moderate Pain (4 - 6)  benzocaine 15 mG/menthol 3.6 mG Lozenge 1 Lozenge Oral every 4 hours PRN Sore Throat  dextrose Gel 1 Dose(s) Oral once PRN Blood Glucose LESS THAN 70 milliGRAM(s)/deciliter  glucagon  Injectable 1 milliGRAM(s) IntraMuscular once PRN Glucose LESS THAN 70 milligrams/deciliter      ALLERGIES:  Allergies    No Known Allergies    Intolerances        LABS:                        8.8    36.4  )-----------( 203      ( 15 Mar 2018 01:24 )             28.2     03-15    137  |  98  |  50<H>  ----------------------------<  146<H>  4.0   |  20<L>  |  2.20<H>    Ca    8.6      15 Mar 2018 07:20  Phos  4.0     03-15  Mg     1.9     03-15    TPro  7.3  /  Alb  3.0<L>  /  TBili  4.9<H>  /  DBili  2.5<H>  /  AST  27  /  ALT  21  /  AlkPhos  138<H>  03-15    PT/INR - ( 14 Mar 2018 07:05 )   PT: 26.6 sec;   INR: 2.35          PTT - ( 14 Mar 2018 07:05 )  PTT:40.6 sec  Urinalysis Basic - ( 15 Mar 2018 09:29 )    Color: Yellow / Appearance: Cloudy / SG: >=1.030 / pH: x  Gluc: x / Ketone: Trace mg/dL  / Bili: Moderate / Urobili: 1.0 E.U./dL   Blood: x / Protein: 100 mg/dL / Nitrite: NEGATIVE   Leuk Esterase: Small / RBC: Many /HPF / WBC < 5 /HPF   Sq Epi: x / Non Sq Epi: 0-5 /HPF / Bacteria: Present /HPF        RADIOLOGY & ADDITIONAL TESTS: Reviewed.

## 2018-03-15 NOTE — CHART NOTE - NSCHARTNOTEFT_GEN_A_CORE
Admitting Diagnosis:   63M PMH HFrEF 10-15% (ischemic), MI, s/p AICD vs PPM, possible Afib, HTN, DM2 on insulin, possible CKD, and gout, who presents with a chief complaint of generalized weakness now in septic shock likely 2/2 LE cellulitis    PAST MEDICAL & SURGICAL HISTORY:  Type 2 diabetes mellitus with diabetic peripheral angiopathy without gangrene, with long-term curren  Essential hypertension, benign  Gout  Pacemaker  Chronic systolic heart failure  Myocardial infarction  No significant past surgical history    Current Nutrition Order: Renal/Consistent CHO (no snacks) - 1000 mL Fluid restriction; Ensure enlive BID; Ensure pudding BID    PO Intake: Good (%) [   ]  Fair (50-75%) [   ] Poor (<25%) [ X  ]    GI Issues: Last BM 3/13    Pain: None reported    Skin Integrity: Intact    Labs:   03-15    137  |  98  |  50<H>  ----------------------------<  146<H>  4.0   |  20<L>  |  2.20<H>    Ca    8.6      15 Mar 2018 07:20  Phos  4.0     03-15  Mg     1.9     03-15    TPro  6.5  /  Alb  2.1<L>  /  TBili  3.6<H>  /  DBili  x   /  AST  27  /  ALT  20  /  AlkPhos  150<H>  03-14    POCT Blood Glucose.: 136 mg/dL (14 Mar 2018 22:13)  POCT Blood Glucose.: 134 mg/dL (14 Mar 2018 16:57)  POCT Blood Glucose.: 122 mg/dL (14 Mar 2018 11:03)    Medications:  MEDICATIONS  (STANDING):  albumin human 25% IVPB 50 milliLiter(s) IV Intermittent every 6 hours  bisacodyl 5 milliGRAM(s) Oral daily  dextrose 5%. 1000 milliLiter(s) (50 mL/Hr) IV Continuous <Continuous>  dextrose 50% Injectable 12.5 Gram(s) IV Push once  dextrose 50% Injectable 25 Gram(s) IV Push once  dextrose 50% Injectable 25 Gram(s) IV Push once  DOBUTamine Infusion 1 MICROgram(s)/kG/Min (2.4 mL/Hr) IV Continuous <Continuous>  folic acid 1 milliGRAM(s) Oral daily  hydrocortisone sodium succinate Injectable 50 milliGRAM(s) IV Push every 6 hours  insulin glargine Injectable (LANTUS) 6 Unit(s) SubCutaneous at bedtime  insulin lispro (HumaLOG) corrective regimen sliding scale   SubCutaneous Before meals and at bedtime  multivitamin 1 Tablet(s) Oral daily  norepinephrine Infusion 0.01 MICROgram(s)/kG/Min (0.75 mL/Hr) IV Continuous <Continuous>  piperacillin/tazobactam IVPB. 3.375 Gram(s) IV Intermittent every 6 hours  PureFlow Dialysate RFP-400 (K 2 / Ca 3) 5000 milliLiter(s) (2000 mL/Hr) CRRT <Continuous>  thiamine 100 milliGRAM(s) Oral daily    MEDICATIONS  (PRN):  acetaminophen   Tablet. 650 milliGRAM(s) Oral every 6 hours PRN Moderate Pain (4 - 6)  benzocaine 15 mG/menthol 3.6 mG Lozenge 1 Lozenge Oral every 4 hours PRN Sore Throat  dextrose Gel 1 Dose(s) Oral once PRN Blood Glucose LESS THAN 70 milliGRAM(s)/deciliter  glucagon  Injectable 1 milliGRAM(s) IntraMuscular once PRN Glucose LESS THAN 70 milligrams/deciliter    Weight:  No new weights to assess    Estimated energy needs using 80 kg ABW:  ABW used due to pt between % of IBW.   needs 2* critical illness.  25-30 kcal/kg (8062-9682 kcal).   1.2-1.4 g/kg ( g protein).  Fluids 500 mL + UOP 2* CVVHD    Subjective: Pt c/o poor appetite. Refusing to drink PO supplements. Discussed liberalizing diet to consistent CHO (with snack) + supplements to help offer pt more options, MD agreeable.     Previous Nutrition Diagnosis:  Inadequate oral intake RT fair appetite AEB 50-75% PO intake at present    Active [  X ]  Resolved [   ]    Goal: Meet % of nutrition needs via tolerated route    Recommendations:  1. Consistent CHO (with snack) + ensure enlive BID & ensure pudding BID  2. Trend daily weights  3. Encourage PO intake & [place] pt preferences    Education: Adequate oral intake    Risk Level: High [  X ] Moderate [   ] Low [   ]

## 2018-03-15 NOTE — PROGRESS NOTE ADULT - PROBLEM SELECTOR PLAN 4
Anemia of chronic renal disease with hemoglobin 8.8 at present. Not iron deficient.  Ferritin: 1377 and T sat% 34.  obtain Haptoglobin/LDH to assess TMA although less likely   Work up for paraproteinemia (SPEP, Serum immunofixation and Serum free light chain ratio) due to proteinuria and hematuria.  No EPO or iron at present.   Monitor hemoglobin for now  Consider hematology/ID evaluation for Persistent leukocytosis and hypothermia  for further evaluation.

## 2018-03-15 NOTE — PROGRESS NOTE ADULT - PROBLEM SELECTOR PLAN 1
Oliguric GILMER likely ischemic ATN with septic/cardiogenic shock with slowly improving renal function and electrolytes stable on CVVHD for now still remains on IV levophed and dobutamine at present.     Plan:  Avoid Nephrotoxic agents.  Strict I/o for now  Abdominal ultrasound with normal size kidney with normal echogenicity.  Adjust antibiotics as per renal dose clearance of Cr cl<10ml/min while on CVVHD  Interval chest tube drain of right pleural effusion with possible removal today  Continue inotropic support and IV pressors for now per primary ICU/cardiology team  Will obtain work up for other etiology ( C3, C4, LDH, Haptoglobin, SPEP, Immunofixation, JAY, Free light chain ratio)  Hepatitis and HIV negative.  Uric acid 9.8 at present.  Will continue CVVHD for now with net fluid balance to be net negative in next 24 hours with increased UF to 75 cc/hr for now  No IHD today. Will reassess in AM tomorrow. Oliguric GILMER likely ischemic ATN with septic/cardiogenic shock with slowly improving renal function and electrolytes stable on CVVHD for now still remains on IV levophed and dobutamine at present.     Plan:  Avoid Nephrotoxic agents.  Strict I/o for now  Abdominal ultrasound with normal size kidney with normal echogenicity.  Adjust antibiotics as per renal dose clearance of Cr cl<10ml/min while on CVVHD  Interval chest tube drain of right pleural effusion with possible removal today  Continue inotropic support and IV pressors for now per primary ICU/cardiology team  Will obtain work up for other etiology ( C3, C4, LDH, Haptoglobin, SPEP, Immunofixation, JAY, Free light chain ratio)  Hepatitis and HIV negative.  Uric acid 9.8 at present.  Will continue CVVHD with IV albumin for now with net fluid balance to be net negative in next 24 hours with increased UF to 75 cc/hr for now  No IHD today. Will reassess in AM tomorrow.

## 2018-03-15 NOTE — PROGRESS NOTE ADULT - PROBLEM SELECTOR PLAN 5
Patient's Calcium 8.3 and phosphorus 4.0 at present.  Obtain intact PTH, VItamin D 25 and Vitamin D 1,25 level for now  Low phos/renal diet.  Monitor BMP, Calcium, K, phosphorus, mag every 6 hrly for now while being on CVVHD.

## 2018-03-15 NOTE — PROGRESS NOTE ADULT - ATTENDING COMMENTS
Patient seen and examined with house-staff during bedside rounds.  Resident note read, including vitals, physical findings, laboratory data, and radiological reports.   Revisions included below.  Direct personal management at bed side and extensive interpretation of the data.  Plan was outlined and discussed in details with the housestaff.  Decision making of high complexity  Action taken for acute disease activity to reflect the level of care provided:  - medication reconciliation  - review laboratory data  - remove barnes and CT  - fluid status is stable  - gallium scan  - still requiring pressors  - respiratory status is stable  - evaluated hypothermia  - on steroids

## 2018-03-15 NOTE — PROGRESS NOTE ADULT - SUBJECTIVE AND OBJECTIVE BOX
Patient is a 63y Male seen and evaluated at bedside. Patient remains critically ill lying in bed in no acute distress. Overnight patient remains hypothermic and hypotensive on IV levophed and dobutamine at present. Patient remains oliguric  at present and able to tolerate CVVHD with 50 ml/hr UF. Fluid balance not documented with UF since yesterday. Probable net neutral at present. Chest xray stable unchanged.      acetaminophen   Tablet. 650 every 6 hours PRN  albumin human 25% IVPB 50 every 6 hours  benzocaine 15 mG/menthol 3.6 mG Lozenge 1 every 4 hours PRN  bisacodyl 5 daily  dextrose 5%. 1000 <Continuous>  dextrose 50% Injectable 12.5 once  dextrose 50% Injectable 25 once  dextrose 50% Injectable 25 once  dextrose Gel 1 once PRN  DOBUTamine Infusion 1 <Continuous>  folic acid 1 daily  glucagon  Injectable 1 once PRN  hydrocortisone sodium succinate Injectable 50 every 6 hours  insulin glargine Injectable (LANTUS) 6 at bedtime  insulin lispro (HumaLOG) corrective regimen sliding scale  Before meals and at bedtime  multivitamin 1 daily  norepinephrine Infusion 0.01 <Continuous>  piperacillin/tazobactam IVPB. 3.375 every 6 hours  PureFlow Dialysate RFP-400 (K 2 / Ca 3) 5000 <Continuous>  thiamine 100 daily      Allergies    No Known Allergies    Intolerances        T(C): , Max: 36.2 (03-14-18 @ 15:50)  T(F): , Max: 97.1 (03-14-18 @ 15:50)  HR: 92 (03-15-18 @ 10:00)  BP: 85/64 (03-15-18 @ 10:00)  BP(mean): 69 (03-15-18 @ 10:00)  RR: 19 (03-15-18 @ 10:00)  SpO2: 98% (03-15-18 @ 10:00)  Wt(kg): --    03-14 @ 07:01  -  03-15 @ 07:00  --------------------------------------------------------  IN: 923.2 mL / OUT: 40 mL / NET: 883.2 mL    03-15 @ 07:01  -  03-15 @ 10:53  --------------------------------------------------------  IN: 13.2 mL / OUT: 5 mL / NET: 8.2 mL          Review of Systems:  CONSTITUTIONAL: Remains lethargic and fatigue, lying in bed, Hypothermic  EYES: No blurred or double vision.  RESPIRATORY: Mild shortness of breath, No cough, hemoptysis  CARDIOVASCULAR: No Chest pain , palpitations, dizziness  GASTROINTESTINAL: No abdominal or flank pain, No nausea or vomiting, No diarrhea  GENITOURINARY: No dysuria or urinary burning, No difficulty passing urine, No hematuria  NEUROLOGICAL: No headaches or blurred vision  SKIN: No skin rashes   MUSCULOSKELETAL: No arthralgia, Joint pain, leg edema, No muscle pains      PHYSICAL EXAM:  GENERAL: NAD, well-developed, well nourished, alert, awake, no acute distress at present  HEAD:  Atraumatic, Normocephalic,   EYES: Bilateral conjuctiva and sclera normal   Oral cavity: Oral mucosa dry and pale  NECK: Neck supple, No JVD  CHEST/LUNG: Bilateral decreased breath sounds, Bibasilar minimal rhonchi and crepitations, no wheezing  HEART: Regular rate and rhythm. HOMER II/VI at LPSB, No gallop, no rub   ABDOMEN: Soft, Nontender, non distended, BS+nt, Scrotal and thigh and leg edema present.   EXTREMITIES: Bilateral leg and thigh edema present.   Neurology: AAOx2, Lethargic and fatigue lying in bed, answers questions, no focal neurological deficit  SKIN: No rashes or lesions          ACCESS:     LABS:                        8.8    36.4  )-----------( 203      ( 15 Mar 2018 01:24 )             28.2     03-15    137  |  98  |  50<H>  ----------------------------<  146<H>  4.0   |  20<L>  |  2.20<H>    Ca    8.6      15 Mar 2018 07:20  Phos  4.0     03-15  Mg     1.9     03-15    TPro  6.5  /  Alb  2.1<L>  /  TBili  3.6<H>  /  DBili  x   /  AST  27  /  ALT  20  /  AlkPhos  150<H>  03-14      PT/INR - ( 14 Mar 2018 07:05 )   PT: 26.6 sec;   INR: 2.35          PTT - ( 14 Mar 2018 07:05 )  PTT:40.6 sec  Urinalysis Basic - ( 15 Mar 2018 09:29 )    Color: Yellow / Appearance: Cloudy / SG: >=1.030 / pH: x  Gluc: x / Ketone: Trace mg/dL  / Bili: Moderate / Urobili: 1.0 E.U./dL   Blood: x / Protein: 100 mg/dL / Nitrite: NEGATIVE   Leuk Esterase: Small / RBC: Many /HPF / WBC < 5 /HPF   Sq Epi: x / Non Sq Epi: 0-5 /HPF / Bacteria: Present /HPF      Creatinine, Random Urine: 217 mg/dL (03-15 @ 09:29)  Protein/Creatinine Ratio Calculation: 0.9 Ratio (03-15 @ 09:29)        RADIOLOGY & ADDITIONAL STUDIES:  < from: Xray Chest 1 View- PORTABLE-Routine (03.14.18 @ 05:31) >    EXAM:  XR CHEST PORTABLE ROUTINE 1V                          PROCEDURE DATE:  03/14/2018                     INTERPRETATION:  Portable chest    History: SOB, follow up abnormal exam    Pleural effusions similar to increasing compared to prior exam 3/13/18.    No other change noted.    Impression:    Pleural effusions similar to increasing.            "Thank you for the opportunity to participate in the care of this   patient."        HESHAM BERRIOS M.D., ATTENDING RADIOLOGIST  This document hasbeen electronically signed. Mar 14 2018 12:33PM                  < end of copied text >

## 2018-03-15 NOTE — PROGRESS NOTE ADULT - ASSESSMENT
63 M with hx of MI, CHF EF(10-15%), DM type 2, HTN, CKD, Gout p/w septic shock and bilateral LL cellulitis, tachycardic, WBC 32, lactate 6. CT of LL did not revealed any gas/collections. Admitted to MICU for further management.  Repeat CT 3/12 negative for nec fasc.   Hypothermic and more lethargic today. WBC 36.4 [37.7] [36.4]; Lactate downtrending 1.8 [2.6] [4.6].  Pressor requirements decreasing norepinephrine 2 down from 16, off levo on dobutamine. Patient currently normotensive.   -Dr. Duarte discussed with pt the possible need for amputation, if condition worsens. Patient reports that under no circumstance that he would consent to lower extremity amputation.   Plan:  -Cont IV abx  -No plan for amputation at this time  -appreciate care per primary team  -Vascular will follow   -Plan d/w Chief and Dr. Duarte 63 M with hx of MI, CHF EF(10-15%), DM type 2, HTN, CKD, Gout p/w septic shock and bilateral LL cellulitis, tachycardic, WBC 32, lactate 6. CT of LL did not revealed any gas/collections. Admitted to MICU for further management.  Repeat CT 3/12 negative for nec fasc.   Hypothermic and more lethargic today. WBC 36.4 [37.7] [36.4]; Lactate downtrending 1.8 [2.6] [4.6].  Pressor requirements decreasing norepinephrine 2 down from 16, off levo on dobutamine. Patient currently normotensive.   -Dr. Duarte discussed with pt the possible need for amputation, if condition worsens. Patient reports that under no circumstance that he would consent to lower extremity amputation.   Plan:  - f/u cx from clear drainage from R foot  -Cont IV abx  -No plan for amputation at this time  -appreciate care per primary team  -Vascular will follow   -Plan d/w Chief and Dr. Duarte

## 2018-03-16 DIAGNOSIS — F05 DELIRIUM DUE TO KNOWN PHYSIOLOGICAL CONDITION: ICD-10-CM

## 2018-03-16 LAB
ANION GAP SERPL CALC-SCNC: 14 MMOL/L — SIGNIFICANT CHANGE UP (ref 5–17)
ANION GAP SERPL CALC-SCNC: 15 MMOL/L — SIGNIFICANT CHANGE UP (ref 5–17)
ANION GAP SERPL CALC-SCNC: 16 MMOL/L — SIGNIFICANT CHANGE UP (ref 5–17)
ANION GAP SERPL CALC-SCNC: 20 MMOL/L — HIGH (ref 5–17)
APTT BLD: 49 SEC — HIGH (ref 27.5–37.4)
APTT BLD: 50.9 SEC — HIGH (ref 27.5–37.4)
APTT BLD: 51.8 SEC — HIGH (ref 27.5–37.4)
BASE EXCESS BLDMV CALC-SCNC: -3.7 MMOL/L — SIGNIFICANT CHANGE UP
BLD GP AB SCN SERPL QL: NEGATIVE — SIGNIFICANT CHANGE UP
BUN SERPL-MCNC: 39 MG/DL — HIGH (ref 7–23)
BUN SERPL-MCNC: 39 MG/DL — HIGH (ref 7–23)
BUN SERPL-MCNC: 44 MG/DL — HIGH (ref 7–23)
BUN SERPL-MCNC: 44 MG/DL — HIGH (ref 7–23)
CALCIUM SERPL-MCNC: 8.2 MG/DL — LOW (ref 8.4–10.5)
CALCIUM SERPL-MCNC: 8.2 MG/DL — LOW (ref 8.4–10.5)
CALCIUM SERPL-MCNC: 8.3 MG/DL — LOW (ref 8.4–10.5)
CALCIUM SERPL-MCNC: 8.5 MG/DL — SIGNIFICANT CHANGE UP (ref 8.4–10.5)
CHLORIDE SERPL-SCNC: 100 MMOL/L — SIGNIFICANT CHANGE UP (ref 96–108)
CHLORIDE SERPL-SCNC: 96 MMOL/L — SIGNIFICANT CHANGE UP (ref 96–108)
CHLORIDE SERPL-SCNC: 98 MMOL/L — SIGNIFICANT CHANGE UP (ref 96–108)
CHLORIDE SERPL-SCNC: 98 MMOL/L — SIGNIFICANT CHANGE UP (ref 96–108)
CO2 SERPL-SCNC: 18 MMOL/L — LOW (ref 22–31)
CO2 SERPL-SCNC: 21 MMOL/L — LOW (ref 22–31)
CO2 SERPL-SCNC: 21 MMOL/L — LOW (ref 22–31)
CO2 SERPL-SCNC: 24 MMOL/L — SIGNIFICANT CHANGE UP (ref 22–31)
CREAT SERPL-MCNC: 1.72 MG/DL — HIGH (ref 0.5–1.3)
CREAT SERPL-MCNC: 1.84 MG/DL — HIGH (ref 0.5–1.3)
CREAT SERPL-MCNC: 1.85 MG/DL — HIGH (ref 0.5–1.3)
CREAT SERPL-MCNC: 1.98 MG/DL — HIGH (ref 0.5–1.3)
GAS PNL BLDMV: SIGNIFICANT CHANGE UP
GAS PNL BLDV: SIGNIFICANT CHANGE UP
GLUCOSE BLDC GLUCOMTR-MCNC: 144 MG/DL — HIGH (ref 70–99)
GLUCOSE BLDC GLUCOMTR-MCNC: 158 MG/DL — HIGH (ref 70–99)
GLUCOSE BLDC GLUCOMTR-MCNC: 212 MG/DL — HIGH (ref 70–99)
GLUCOSE BLDC GLUCOMTR-MCNC: 233 MG/DL — HIGH (ref 70–99)
GLUCOSE BLDC GLUCOMTR-MCNC: 240 MG/DL — HIGH (ref 70–99)
GLUCOSE BLDC GLUCOMTR-MCNC: 245 MG/DL — HIGH (ref 70–99)
GLUCOSE SERPL-MCNC: 151 MG/DL — HIGH (ref 70–99)
GLUCOSE SERPL-MCNC: 155 MG/DL — HIGH (ref 70–99)
GLUCOSE SERPL-MCNC: 177 MG/DL — HIGH (ref 70–99)
GLUCOSE SERPL-MCNC: 253 MG/DL — HIGH (ref 70–99)
GRAM STN FLD: SIGNIFICANT CHANGE UP
GRAM STN FLD: SIGNIFICANT CHANGE UP
HCO3 BLDMV-SCNC: 22 MMOL/L — SIGNIFICANT CHANGE UP
HCT VFR BLD CALC: 22.3 % — LOW (ref 39–50)
HCT VFR BLD CALC: 24.1 % — LOW (ref 39–50)
HCT VFR BLD CALC: 24.2 % — LOW (ref 39–50)
HCT VFR BLD CALC: 26.9 % — LOW (ref 39–50)
HGB BLD-MCNC: 7 G/DL — CRITICAL LOW (ref 13–17)
HGB BLD-MCNC: 7.4 G/DL — LOW (ref 13–17)
HGB BLD-MCNC: 7.5 G/DL — LOW (ref 13–17)
HGB BLD-MCNC: 8.5 G/DL — LOW (ref 13–17)
INR BLD: 1.75 — HIGH (ref 0.88–1.16)
INR BLD: 1.78 — HIGH (ref 0.88–1.16)
MAGNESIUM SERPL-MCNC: 1.8 MG/DL — SIGNIFICANT CHANGE UP (ref 1.6–2.6)
MAGNESIUM SERPL-MCNC: 1.9 MG/DL — SIGNIFICANT CHANGE UP (ref 1.6–2.6)
MCHC RBC-ENTMCNC: 28.5 PG — SIGNIFICANT CHANGE UP (ref 27–34)
MCHC RBC-ENTMCNC: 28.8 PG — SIGNIFICANT CHANGE UP (ref 27–34)
MCHC RBC-ENTMCNC: 29.3 PG — SIGNIFICANT CHANGE UP (ref 27–34)
MCHC RBC-ENTMCNC: 29.6 PG — SIGNIFICANT CHANGE UP (ref 27–34)
MCHC RBC-ENTMCNC: 30.7 G/DL — LOW (ref 32–36)
MCHC RBC-ENTMCNC: 31 G/DL — LOW (ref 32–36)
MCHC RBC-ENTMCNC: 31.4 G/DL — LOW (ref 32–36)
MCHC RBC-ENTMCNC: 31.6 G/DL — LOW (ref 32–36)
MCV RBC AUTO: 92.7 FL — SIGNIFICANT CHANGE UP (ref 80–100)
MCV RBC AUTO: 93.1 FL — SIGNIFICANT CHANGE UP (ref 80–100)
MCV RBC AUTO: 93.3 FL — SIGNIFICANT CHANGE UP (ref 80–100)
MCV RBC AUTO: 93.7 FL — SIGNIFICANT CHANGE UP (ref 80–100)
O2 CT VFR BLD CALC: 32 MMHG — SIGNIFICANT CHANGE UP (ref 30–65)
PCO2 BLDMV: 40 MMHG — SIGNIFICANT CHANGE UP (ref 30–65)
PH BLDMV: 7.35 — SIGNIFICANT CHANGE UP (ref 7.2–7.45)
PHOSPHATE SERPL-MCNC: 3 MG/DL — SIGNIFICANT CHANGE UP (ref 2.5–4.5)
PHOSPHATE SERPL-MCNC: 3.1 MG/DL — SIGNIFICANT CHANGE UP (ref 2.5–4.5)
PHOSPHATE SERPL-MCNC: 3.1 MG/DL — SIGNIFICANT CHANGE UP (ref 2.5–4.5)
PHOSPHATE SERPL-MCNC: 3.9 MG/DL — SIGNIFICANT CHANGE UP (ref 2.5–4.5)
PLATELET # BLD AUTO: 122 K/UL — LOW (ref 150–400)
PLATELET # BLD AUTO: 124 K/UL — LOW (ref 150–400)
PLATELET # BLD AUTO: 127 K/UL — LOW (ref 150–400)
PLATELET # BLD AUTO: 137 K/UL — LOW (ref 150–400)
POTASSIUM SERPL-MCNC: 3.5 MMOL/L — SIGNIFICANT CHANGE UP (ref 3.5–5.3)
POTASSIUM SERPL-MCNC: 3.8 MMOL/L — SIGNIFICANT CHANGE UP (ref 3.5–5.3)
POTASSIUM SERPL-SCNC: 3.5 MMOL/L — SIGNIFICANT CHANGE UP (ref 3.5–5.3)
POTASSIUM SERPL-SCNC: 3.8 MMOL/L — SIGNIFICANT CHANGE UP (ref 3.5–5.3)
PROT SERPL-MCNC: 5.8 G/DL — LOW (ref 6–8.3)
PROT SERPL-MCNC: 5.8 G/DL — LOW (ref 6–8.3)
PROTHROM AB SERPL-ACNC: 19.6 SEC — HIGH (ref 9.8–12.7)
PROTHROM AB SERPL-ACNC: 20 SEC — HIGH (ref 9.8–12.7)
RBC # BLD: 2.39 M/UL — LOW (ref 4.2–5.8)
RBC # BLD: 2.6 M/UL — LOW (ref 4.2–5.8)
RBC # BLD: 2.6 M/UL — LOW (ref 4.2–5.8)
RBC # BLD: 2.87 M/UL — LOW (ref 4.2–5.8)
RBC # FLD: 15.4 % — SIGNIFICANT CHANGE UP (ref 10.3–16.9)
RBC # FLD: 15.6 % — SIGNIFICANT CHANGE UP (ref 10.3–16.9)
RBC # FLD: 15.7 % — SIGNIFICANT CHANGE UP (ref 10.3–16.9)
RBC # FLD: 16 % — SIGNIFICANT CHANGE UP (ref 10.3–16.9)
RH IG SCN BLD-IMP: POSITIVE — SIGNIFICANT CHANGE UP
SAO2 % BLDMV: 55 % — SIGNIFICANT CHANGE UP
SODIUM SERPL-SCNC: 134 MMOL/L — LOW (ref 135–145)
SODIUM SERPL-SCNC: 134 MMOL/L — LOW (ref 135–145)
SODIUM SERPL-SCNC: 136 MMOL/L — SIGNIFICANT CHANGE UP (ref 135–145)
SODIUM SERPL-SCNC: 137 MMOL/L — SIGNIFICANT CHANGE UP (ref 135–145)
SPECIMEN SOURCE: SIGNIFICANT CHANGE UP
SPECIMEN SOURCE: SIGNIFICANT CHANGE UP
VANCOMYCIN TROUGH SERPL-MCNC: 17 UG/ML — SIGNIFICANT CHANGE UP (ref 10–20)
WBC # BLD: 24.8 K/UL — HIGH (ref 3.8–10.5)
WBC # BLD: 26.2 K/UL — HIGH (ref 3.8–10.5)
WBC # BLD: 28 K/UL — HIGH (ref 3.8–10.5)
WBC # BLD: 28 K/UL — HIGH (ref 3.8–10.5)
WBC # FLD AUTO: 24.8 K/UL — HIGH (ref 3.8–10.5)
WBC # FLD AUTO: 26.2 K/UL — HIGH (ref 3.8–10.5)
WBC # FLD AUTO: 28 K/UL — HIGH (ref 3.8–10.5)
WBC # FLD AUTO: 28 K/UL — HIGH (ref 3.8–10.5)

## 2018-03-16 PROCEDURE — 99223 1ST HOSP IP/OBS HIGH 75: CPT

## 2018-03-16 PROCEDURE — 71045 X-RAY EXAM CHEST 1 VIEW: CPT | Mod: 26

## 2018-03-16 PROCEDURE — 99233 SBSQ HOSP IP/OBS HIGH 50: CPT | Mod: GC

## 2018-03-16 RX ORDER — PIPERACILLIN AND TAZOBACTAM 4; .5 G/20ML; G/20ML
2.25 INJECTION, POWDER, LYOPHILIZED, FOR SOLUTION INTRAVENOUS EVERY 8 HOURS
Qty: 0 | Refills: 0 | Status: DISCONTINUED | OUTPATIENT
Start: 2018-03-16 | End: 2018-03-21

## 2018-03-16 RX ORDER — ALBUMIN HUMAN 25 %
50 VIAL (ML) INTRAVENOUS
Qty: 0 | Refills: 0 | Status: COMPLETED | OUTPATIENT
Start: 2018-03-16 | End: 2018-03-16

## 2018-03-16 RX ORDER — VANCOMYCIN HCL 1 G
1250 VIAL (EA) INTRAVENOUS ONCE
Qty: 0 | Refills: 0 | Status: COMPLETED | OUTPATIENT
Start: 2018-03-16 | End: 2018-03-16

## 2018-03-16 RX ORDER — HEPARIN SODIUM 5000 [USP'U]/ML
200 INJECTION INTRAVENOUS; SUBCUTANEOUS
Qty: 25000 | Refills: 0 | Status: DISCONTINUED | OUTPATIENT
Start: 2018-03-16 | End: 2018-03-17

## 2018-03-16 RX ORDER — PIPERACILLIN AND TAZOBACTAM 4; .5 G/20ML; G/20ML
2.25 INJECTION, POWDER, LYOPHILIZED, FOR SOLUTION INTRAVENOUS EVERY 8 HOURS
Qty: 0 | Refills: 0 | Status: DISCONTINUED | OUTPATIENT
Start: 2018-03-16 | End: 2018-03-16

## 2018-03-16 RX ORDER — PIPERACILLIN AND TAZOBACTAM 4; .5 G/20ML; G/20ML
INJECTION, POWDER, LYOPHILIZED, FOR SOLUTION INTRAVENOUS
Qty: 0 | Refills: 0 | Status: DISCONTINUED | OUTPATIENT
Start: 2018-03-16 | End: 2018-03-16

## 2018-03-16 RX ORDER — WARFARIN SODIUM 2.5 MG/1
2 TABLET ORAL ONCE
Qty: 0 | Refills: 0 | Status: DISCONTINUED | OUTPATIENT
Start: 2018-03-16 | End: 2018-03-16

## 2018-03-16 RX ORDER — PIPERACILLIN AND TAZOBACTAM 4; .5 G/20ML; G/20ML
2.25 INJECTION, POWDER, LYOPHILIZED, FOR SOLUTION INTRAVENOUS ONCE
Qty: 0 | Refills: 0 | Status: DISCONTINUED | OUTPATIENT
Start: 2018-03-16 | End: 2018-03-16

## 2018-03-16 RX ADMIN — PIPERACILLIN AND TAZOBACTAM 200 GRAM(S): 4; .5 INJECTION, POWDER, LYOPHILIZED, FOR SOLUTION INTRAVENOUS at 05:02

## 2018-03-16 RX ADMIN — Medication 50 MILLIGRAM(S): at 00:12

## 2018-03-16 RX ADMIN — INSULIN GLARGINE 6 UNIT(S): 100 INJECTION, SOLUTION SUBCUTANEOUS at 23:15

## 2018-03-16 RX ADMIN — PIPERACILLIN AND TAZOBACTAM 200 GRAM(S): 4; .5 INJECTION, POWDER, LYOPHILIZED, FOR SOLUTION INTRAVENOUS at 12:20

## 2018-03-16 RX ADMIN — Medication 50 MILLILITER(S): at 12:19

## 2018-03-16 RX ADMIN — Medication 50 MILLIGRAM(S): at 23:15

## 2018-03-16 RX ADMIN — Medication 50 MILLIGRAM(S): at 19:52

## 2018-03-16 RX ADMIN — Medication 4.8 MICROGRAM(S)/KG/MIN: at 01:48

## 2018-03-16 RX ADMIN — Medication 50 MILLILITER(S): at 00:13

## 2018-03-16 RX ADMIN — Medication 166.67 MILLIGRAM(S): at 17:00

## 2018-03-16 RX ADMIN — Medication 50 MILLIGRAM(S): at 12:20

## 2018-03-16 RX ADMIN — Medication 1 TABLET(S): at 12:21

## 2018-03-16 RX ADMIN — Medication 4: at 23:15

## 2018-03-16 RX ADMIN — PIPERACILLIN AND TAZOBACTAM 200 GRAM(S): 4; .5 INJECTION, POWDER, LYOPHILIZED, FOR SOLUTION INTRAVENOUS at 00:12

## 2018-03-16 RX ADMIN — PIPERACILLIN AND TAZOBACTAM 200 GRAM(S): 4; .5 INJECTION, POWDER, LYOPHILIZED, FOR SOLUTION INTRAVENOUS at 23:15

## 2018-03-16 RX ADMIN — HEPARIN SODIUM 2 UNIT(S)/HR: 5000 INJECTION INTRAVENOUS; SUBCUTANEOUS at 12:20

## 2018-03-16 RX ADMIN — Medication 1 MILLIGRAM(S): at 12:21

## 2018-03-16 RX ADMIN — Medication 4: at 19:52

## 2018-03-16 RX ADMIN — Medication 50 MILLILITER(S): at 19:53

## 2018-03-16 RX ADMIN — Medication 50 MILLIGRAM(S): at 05:02

## 2018-03-16 RX ADMIN — Medication 100 MILLIGRAM(S): at 12:21

## 2018-03-16 RX ADMIN — Medication 2: at 05:56

## 2018-03-16 RX ADMIN — Medication 0.75 MICROGRAM(S)/KG/MIN: at 06:41

## 2018-03-16 RX ADMIN — Medication 5 MILLIGRAM(S): at 12:26

## 2018-03-16 RX ADMIN — Medication 50 MILLILITER(S): at 06:40

## 2018-03-16 NOTE — PROGRESS NOTE ADULT - ASSESSMENT
ASSESSMENT: 63M PMH HFrEF 10-15% (ischemic), MI, s/p AICD vs PPM, possible Afib, HTN, DM2 on insulin, possible CKD, and gout, who presents with a chief complaint of generalized weakness now in septic shock likely 2/2 LE cellulitis    NEURO  #Toxic metabolic encephalopathy  - Lethargic. Improved compared to yesterday. Likely 2/2 severe sepsis. wIll continue monitoring mental status   - Other possible etiologies include uremic encephalopathy, intoxication.   - Will observe on CRRT and assess status after uremia resolution.      CARDIOVASCULAR   # HYPOTENSION   - 2/2 shock -Septic and Cardiogenic- imprpving   - Pt persistently hypotensive this admission. now improving and off high Levophed. Given low EF and low mixed O2 saturation, restarted on Dobutamine without significant improvement in VBG  - Given poor cardiac function will consult CHF team   - Slowly downtrending WBC count.  - Gallium scan ordered yesterday. Will followup results.   - If continues to have high WBCs, will consult ID.  - Continues to have elevated Lactate. Likely decrease in clearance as well inadequate source control.   - Maintain MAP >75 for renal perfusion.  - CHF with severely reduced EF 15%, caution with fluids. Per renal recs , can give IV albumin for fluids.  - GIving CRRT in the setting of hypotension.     # CHF exacerbation- Hx of CHF with EF 10-15% per pt 2/2 ischemic cardiomyopathy s/p AICD as indicated by CXR   - Elevated BNP on admission with R sided effusion and edema  - TTE shows EF of 15%   - Cardiac shock major component of hypotension c/w Dobutamine.   - Give fluids judiciously given low EF in setting of likely sepsis  - Unclear medical regimen opt. Per [facility] pharmacy ( and [place]) pt has not picked up Torsemide 20 or Metoprolol 100 since November.   - Hold ACE/Metoprolol in setting of sepsis    #AFIB  - Unclear if hx of Afib. Per pharmacy pt on coumadin. Pt currently in Sinus rhythm however had episodes of A fib per records.   - F/u PT/PTT/INR, currently elevated. Pt s/p FFP (3/13,3/14) and Vit K (3/13) for thoracocentesis and HD catheter placement.      #CAD- Hx of MI s/p AICD vs PPI- Per pharmacy pt has not picked up Plavix since November  - Obtain collateral from outpatient cardiologist     # LE Edema   - LE edema with chronic venous stasis.   - Duplex does not show DVT    PULMONARY   #Acute resp failure   - pt with R loculated plural effusion noted to have increased work of breathing. Likely 2/2 fluid overload, FLuid studies consistent with exudative effusion.   - s/p thoracentesis on 3/3 with R chest tube placement. No drainage overnight. Will plan on removing today   - On 4L NC saturating well.   - c/w Solucortef 50 q6h 3/12- given worsening condition and recent hx of Steroid administration 2 months ago  - Plan to keep Hb 9-10. s/p  pRBC transfusion on 3/12  - Monitor resp status.     # Possible Pneumothorax due to trapped lung   - Not seen on recent Xrays. Possibly skin fold   - Pt HD stable. Will monitor     #RENAL   #GILMER- 2/2 ischemic ATN  -Unknown baseline Cr presenting with Cr 3.93 with metabolic derangements.   - Likely 2/2 Sepsis, low intravascular volume and CHF  - Trend BUN and Cr.  - Renal team on board, HD catheter placed on 3/13. CRRT started. UF 50cc/hours  - CT abdomen and pelvis without acute obstruction.   - retroperitoneal ultrasound showing medical renal disease.     #Hyperkalemia   - PT with elevated K to 5.7 on admission,  no EKG changes   - Continue telemetry monitoring  - Trend K   - Can given Kayexalate if persists   - Plan for dialysis this afternoon     #Metabolic acidosis   - 2/2 Lactate, starvation ketoacidosis and uremia   - Lactate downtrending however still elevated.   - wIll recheck today   - not trending   - Bicarb 650 TID     #ID   - septic shock likely due to L LE cellulitis however continues to have persistently elevated WBCs not fully explained by steroids. Given worsening status, will obtain Gallium scan to look for other status.   - TTE with no vegetations. Pt not stable for RUKHSANA   - c/w Vanc/zosyn.   - repeat LE CT does not show abscess or gas.   - Given recent hospitalization, c/w Vanc + Zosyn (renally dosed) 3/15 day 6  - R lung Effusion likely from overload. s/p thoracentesis with improvement in resp status   -  HIV negative.   - Vascular surgery on board. Recommending Amputation as an option if pt continues to be unstable.       #GI   - pt wit Elevated Bilrirubin and Alk phos. Abd exam benign  - CT abdomen/pelvis consistent with cholelithiasis and ascites.     #HEME  # elevated INR. Unclear etiology. Pt on coumadin? - Continue to trend coags  - Given Vit K and FFP3/12. FFP 3/13     #ANemia  - Stable at 8-9. Anemia of chronic disease.  -s/p 1pRBC on 3/12   - WIll keep Hb around 9-10     #ENDOCRINE   - Elevated Glucose. Anion gap elevated however likely 2/2 Uremia and Lactate. Ketones in urine likely 2/2 Starvation.   - S/p insulin drip  - Currently on MISS. Given NPH 4 in AM.   - Monitor blood glucose   - A1C 8.6    F: No IVF   E: Replete K<4 Mg<2, caution in setting of acute renal failure  N: Renal Diet     Lines:  R HD catheter (3/13). R chest tube (3/12), L IJ (3/12). R A line (3/12), barnes   Drips: Levophed, Vasopressin     Full code   Dispo: MICU  Ppx: [facility] ASSESSMENT: 63M PMH HFrEF 10-15% (ischemic), MI, s/p AICD vs PPM, possible Afib, HTN, DM2 on insulin, possible CKD, and gout, who presents with a chief complaint of generalized weakness now in septic shock likely 2/2 LE cellulitis    NEURO  #Toxic metabolic encephalopathy  - Lethargic. Improved compared to yesterday. Likely 2/2 severe sepsis. wIll continue monitoring mental status   - Other possible etiologies include uremic encephalopathy, intoxication.   - Will observe on CRRT and assess status after uremia resolution.      CARDIOVASCULAR   # HYPOTENSION   - 2/2 shock -Septic and Cardiogenic- improving   - Pt persistently hypotensive this admission. now improving and off high Levophed. Given low EF and low mixed O2 saturation, restarted on Dobutamine without significant improvement in VBG  - Given poor cardiac function will consult CHF team   - Slowly downtrending WBC count.  - Gallium scan ordered yesterday. Will followup results.   - If continues to have high WBCs, will consult ID.  - Continues to have elevated Lactate. Likely decrease in clearance as well inadequate source control.   - Maintain MAP >75 for renal perfusion.  - CHF with severely reduced EF 15%, caution with fluids. Per renal recs , can give IV albumin for fluids.  - Will switch to HD given improved BP.     # CHF exacerbation- Hx of CHF with EF 10-15% per pt 2/2 ischemic cardiomyopathy s/p AICD as indicated by CXR   - Elevated BNP on admission with R sided effusion and edema  - TTE shows EF of 15%   - Cardiac shock major component of hypotension c/w Dobutamine.   - Give fluids judiciously given low EF in setting of likely sepsis  - Unclear medical regimen opt. Per [facility] pharmacy ( and [place]) pt has not picked up Torsemide 20 or Metoprolol 100 since November.   - Hold ACE/Metoprolol in setting of sepsis    #AFIB  - Unclear if hx of Afib. Per pharmacy pt on coumadin. Pt currently in Sinus rhythm however had episodes of A fib per records.   - F/u PT/PTT/INR, currently elevated. Pt s/p FFP (3/13,3/14) and Vit K (3/13) for thoracocentesis and HD catheter placement.      #CAD- Hx of MI s/p AICD vs PPI- Per pharmacy pt has not picked up Plavix since November  - Obtain collateral from outpatient cardiologist     # LE Edema   - LE edema with chronic venous stasis.   - Duplex does not show DVT    PULMONARY   #Acute resp failure   - pt with R loculated plural effusion noted to have increased work of breathing. Likely 2/2 fluid overload, FLuid studies consistent with exudative effusion.   - s/p thoracentesis on 3/3 with R chest tube placement. No drainage overnight. Will plan on removing today   - On 4L NC saturating well.   - c/w Solucortef 50 q6h 3/12- given worsening condition and recent hx of Steroid administration 2 months ago  - Plan to keep Hb 9-10. s/p  pRBC transfusion on 3/12  - Monitor resp status.     # Possible Pneumothorax due to trapped lung   - Not seen on recent Xrays. Possibly skin fold   - Pt HD stable. Will monitor     #RENAL   #GILMER- 2/2 ischemic ATN  -Unknown baseline Cr presenting with Cr 3.93 with metabolic derangements.   - Likely 2/2 Sepsis, low intravascular volume and CHF  - Trend BUN and Cr.  - Renal team on board, HD catheter placed on 3/13. CRRT started. UF 50cc/hours  - CT abdomen and pelvis without acute obstruction.   - retroperitoneal ultrasound showing medical renal disease.     #Hyperkalemia   - PT with elevated K to 5.7 on admission,  no EKG changes   - Continue telemetry monitoring  - Trend K   - Can given Kayexalate if persists   - Plan for dialysis this afternoon     #Metabolic acidosis   - 2/2 Lactate, starvation ketoacidosis and uremia   - Lactate downtrending however still elevated.   - wIll recheck today   - not trending   - Bicarb 650 TID     #ID   - septic shock likely due to L LE cellulitis however continues to have persistently elevated WBCs not fully explained by steroids. Given worsening status, will obtain Gallium scan to look for other status.   - TTE with no vegetations. Pt not stable for RUKHSANA   - c/w Vanc/zosyn.   - repeat LE CT does not show abscess or gas.   - Given recent hospitalization, c/w Vanc + Zosyn (renally dosed) 3/15 day 6  - R lung Effusion likely from overload. s/p thoracentesis with improvement in resp status   -  HIV negative.   - Vascular surgery on board. Recommending Amputation as an option if pt continues to be unstable.       #GI   - pt wit Elevated Bilrirubin and Alk phos. Abd exam benign  - CT abdomen/pelvis consistent with cholelithiasis and ascites.     #HEME  # elevated INR. Unclear etiology. Pt on coumadin? - Continue to trend coags  - Given Vit K and FFP3/12. FFP 3/13     #ANemia  - Stable at 8-9. Anemia of chronic disease.  -s/p 1pRBC on 3/12   - WIll keep Hb around 9-10     #ENDOCRINE   - Elevated Glucose. Anion gap elevated however likely 2/2 Uremia and Lactate. Ketones in urine likely 2/2 Starvation.   - S/p insulin drip  - Currently on MISS. Given NPH 4 in AM.   - Monitor blood glucose   - A1C 8.6    F: No IVF   E: Replete K<4 Mg<2, caution in setting of acute renal failure  N: Renal Diet     Lines:  R HD catheter (3/13). R chest tube (3/12), L IJ (3/12). R A line (3/12), barnes   Drips: Levophed, Vasopressin     Full code   Dispo: MICU  Ppx: [facility] ASSESSMENT: 63M PMH HFrEF 10-15% (ischemic), MI, s/p AICD vs PPM, possible Afib, HTN, DM2 on insulin, possible CKD, and gout, who presents with a chief complaint of generalized weakness now in septic shock likely 2/2 LE cellulitis    NEURO  #Toxic metabolic encephalopathy  - Lethargic. Improved compared to yesterday. Likely 2/2 severe sepsis. wIll continue monitoring mental status   - Other possible etiologies include uremic encephalopathy, intoxication.   - Will observe on HD and assess status after uremia resolution.      CARDIOVASCULAR   # HYPOTENSION   - 2/2 shock -Septic and Cardiogenic- improving   - Pt persistently hypotensive this admission. now improving and off high Levophed. Given low EF and low mixed O2 saturation, restarted on Dobutamine without significant improvement in VBG  - Given poor cardiac function will consult CHF team   - Slowly downtrending WBC count.  - Gallium scan ordered yesterday. Will followup results.   - If continues to have high WBCs, will consult ID.  - Continues to have elevated Lactate. Likely decrease in clearance as well inadequate source control.   - Maintain MAP >75 for renal perfusion.  - CHF with severely reduced EF 15%, caution with fluids. Per renal recs , can give IV albumin for fluids.  - Will switch to HD given improved BP.     # CHF exacerbation- Hx of CHF with EF 10-15% per pt 2/2 ischemic cardiomyopathy s/p AICD as indicated by CXR   - Elevated BNP on admission with R sided effusion and edema  - TTE shows EF of 15%   - Cardiac shock major component of hypotension c/w Dobutamine.   - f/u CHF recs   - Give fluids judiciously given low EF in setting of likely sepsis  - Unclear medical regimen opt. Per [facility] pharmacy ( and [place]) pt has not picked up Torsemide 20 or Metoprolol 100 since November.   - Hold ACE/Metoprolol in setting of sepsis    #AFIB  - Unclear if hx of Afib. Per pharmacy pt on coumadin. Pt currently in Sinus rhythm however had episodes of A fib per records.   -Started on Hep drip. Will bridge to coumadin   - Pt s/p FFP (3/13,3/14) and Vit K (3/13) for thoracocentesis and HD catheter placement.      #CAD- Hx of MI s/p AICD vs PPI- Per pharmacy pt has not picked up Plavix since November  - Obtain collateral from outpatient cardiologist     # LE Edema   - LE edema with chronic venous stasis.   - Duplex does not show DVT    PULMONARY   #Acute resp failure   - pt with R loculated plural effusion noted to have increased work of breathing. Likely 2/2 fluid overload, FLuid studies consistent with exudative effusion.   - s/p thoracentesis on 3/13 with R chest tube placement. Chest tube removed 3/15  - On 4L NC saturating well.   - c/w Solucortef 50 q6h 3/12- given worsening condition and recent hx of Steroid administration 2 months ago  - Plan to keep Hb 9-10. s/p  pRBC transfusion on 3/12  - Monitor resp status.     # Possible Pneumothorax due to trapped lung   - Not seen on recent Xrays. Possibly skin fold   - Pt HD stable. Will monitor     #RENAL   #GILMER- 2/2 ischemic ATN  -Unknown baseline Cr presenting with Cr 3.93 with metabolic derangements.   - Likely 2/2 Sepsis, low intravascular volume and CHF  - Trend BUN and Cr.  - Renal team on board, HD catheter placed on 3/13 and CRRT started. Planning to switch to HD today.   - CT abdomen and pelvis without acute obstruction.   - retroperitoneal ultrasound showing medical renal disease.     #Hyperkalemia   - PT with elevated K to 5.7 on admission,  no EKG changes   - Continue telemetry monitoring  - Trend K   - Can given Kayexalate if persists   - c/w dialysis     #Metabolic acidosis   - 2/2 Lactate, starvation ketoacidosis and uremia   - Lactate downtrending however still elevated.   - not trending   - Bicarb 650 TID     #ID   - septic shock likely due to L LE cellulitis however continues to have persistently elevated WBCs not fully explained by steroids. Given worsening status, will obtain Gallium scan to look for other status.   - TTE with no vegetations. Pt not stable for RUKHSANA   - c/w Vanc/zosyn. Dosing vanc by level. Will change Zosyn to HS dosing    - repeat LE CT does not show abscess or gas.   - Given recent hospitalization, c/w Vanc + Zosyn (renally dosed) 3/16 day 7  - R lung Effusion likely from overload. s/p thoracentesis with improvement in resp status   -  HIV negative.   - Vascular surgery on board. Recommending Amputation as an option if pt continues to be unstable.       #GI   - pt wit Elevated Bilirubin and Alk phos. Abd exam benign  - CT abdomen/pelvis consistent with cholelithiasis and ascites.     #HEME  # elevated INR. Unclear etiology. Pt on coumadin?   - Starting on Hep gtt. Will transition to Coumadin.   - Given Vit K and FFP3/12. FFP 3/13     #Anemia  - Stable at 8-9. Anemia of chronic disease.  -s/p 1pRBC on 3/12   - WIll keep Hb around 9-10     #ENDOCRINE   - Elevated Glucose. Anion gap elevated however likely 2/2 Uremia and Lactate. Ketones in urine likely 2/2 Starvation.   - S/p insulin drip  - Currently on MISS.   - Monitor blood glucose   - A1C 8.6    F: No IVF   E: Replete K<4 Mg<2, caution in setting of acute renal failure  N: Renal Diet     Lines:  R HD catheter (3/13). L IJ (3/12). R A line (3/12), barnes   Drips: Levophed, Vasopressin     Full code   Dispo: MICU  Ppx: [facility]

## 2018-03-16 NOTE — CONSULT NOTE ADULT - ASSESSMENT
62 y/o male with PMH of HFrEF 10-15% (ischemic), MI, s/p AICD , possible Afib, HTN, DM2 on insulin, possible CKD, and gout, who presents with a chief complaint of generalized weakness found to have septic shock 2/2 to cellulitis.  Patient was hypotensive since admission on levophed and dobutamine. Cardiology was consult for persistent hypotension and evaluation of low EF 64 y/o male with PMH of HFrEF 10-15% (ischemic), MI, s/p AICD , possible Afib, HTN, DM2 on insulin, possible CKD, and gout, who presents with a chief complaint of generalized weakness found to have septic shock 2/2 to cellulitis.  Patient was hypotensive since admission on levophed and dobutamine. Cardiology was consulted for persistent hypotension and evaluation for CHF        #Systolic heart failure  CXR showed B/L Pleural effusion and intersitial infiltrates   2D echo showed EF of 15 %  Daily weights, Is and Os        #Hypotension        #Atrial fibrillation 62 y/o male with PMH of HFrEF 10-15% (ischemic), MI, s/p AICD , possible Afib, HTN, DM2 on insulin, possible CKD, and gout, who presents with a chief complaint of generalized weakness found to have septic shock 2/2 to cellulitis.  Patient was hypotensive since admission was on levophed and now on dobutamine. Cardiology was consulted for persistent hypotension and low mixed venous sat with concern for cardiogenic shock    Heart failure:  -patient clinically volume overloaded with CVP of 19, mixed venous sat could also be low from other etiologies as anemia. Agree with inotropic support and fluid removal with HD. Likely will see improved mixed venous sats with transfusion  and fluid removal. once patient is volume optimized we can initiate GDMT for heart failure. will follow        Atrial fibrillation:  -Hx of Afib on coumadin but no EKG documentation   -on heparin drip bridging to coumadin    Case discussed wiht Dr. Castorena

## 2018-03-16 NOTE — BEHAVIORAL HEALTH ASSESSMENT NOTE - NSBHCHARTREVIEWLAB_PSY_A_CORE FT
7.0    24.8  )-----------( 127      ( 16 Mar 2018 13:31 )             22.3   03-16    137  |  100  |  39<H>  ----------------------------<  151<H>  3.5   |  21<L>  |  1.85<H>    Ca    8.2<L>      16 Mar 2018 13:31  Phos  3.1     03-16  Mg     1.8     03-16    TPro  5.8<L>  /  Alb  x   /  TBili  x   /  DBili  x   /  AST  x   /  ALT  x   /  AlkPhos  x   03-15

## 2018-03-16 NOTE — BEHAVIORAL HEALTH ASSESSMENT NOTE - HPI (INCLUDE ILLNESS QUALITY, SEVERITY, DURATION, TIMING, CONTEXT, MODIFYING FACTORS, ASSOCIATED SIGNS AND SYMPTOMS)
63M no known PPH, PMH CHF, HTN, DM2 on insulin, possible CKD, and gout, who presented 3/10/18 with AMS, found to be in septic shock 2/2 cellulitis with renal failure, has been on CVVH, still oliguric with plan for HD to start today but patient refusing, made himself DNR today.  Psychiatry consulted to assess for capacity.     Patient seen at bedside.  Minimally conversant, kept eyes closed unless shoulder shaken.  Unclear much of the time whether patient was asleep or refusing to talk.  Patient did indicate that daughter Cristal is his HCP and he trusts her to make medical decisions for him when he can't.  Patient's longtime friend Tree (known for 60 years), arrived at bedside during interview.  Per Tree, patient with no psych history but has been somewhat depressed since becoming ill and being unable to work.  Tree visited patient twice earlier in the week and found him to be at baseline mental status, fully oriented and conversant.  Current condition is different from baseline and Tree thinks patient incapable of refusing dialysis.  Daughter Cristal on her way to hospital currently, also wants patient to accept dialysis as per Tree.  Returned to bedside with Tree and RN present, patient then agreed to dialysis.

## 2018-03-16 NOTE — PROGRESS NOTE ADULT - SUBJECTIVE AND OBJECTIVE BOX
INTERVAL HPI/ OVERNIGHT EVENTS: Pt seen and examined at bedside. Pt lying comfortably in bed at time of visit. Pt drowsy, but able to give one word responses.     piperacillin/tazobactam IVPB. 3.375  DOBUTamine Infusion 2  heparin  Infusion 200  piperacillin/tazobactam IVPB. 3.375      Allergies    No Known Allergies    Intolerances        Vital Signs Last 24 Hrs  T(C): 36.6 (16 Mar 2018 10:00), Max: 36.6 (16 Mar 2018 10:00)  T(F): 97.9 (16 Mar 2018 10:00), Max: 97.9 (16 Mar 2018 10:00)  HR: 88 (16 Mar 2018 08:00) (78 - 96)  BP: 95/67 (16 Mar 2018 06:00) (80/54 - 96/64)  BP(mean): 75 (16 Mar 2018 06:00) (62 - 78)  RR: 18 (16 Mar 2018 08:00) (17 - 25)  SpO2: 99% (16 Mar 2018 08:00) (93% - 100%)  I&O's Summary    15 Mar 2018 07:01  -  16 Mar 2018 07:00  --------------------------------------------------------  IN: 1017.3 mL / OUT: 1626 mL / NET: -608.7 mL    16 Mar 2018 07:01  -  16 Mar 2018 11:28  --------------------------------------------------------  IN: 4.8 mL / OUT: 75 mL / NET: -70.2 mL        Physical Exam:  General: NAD, drowsy but improved from yesterday   Extremities: Dressings c/d/i to R foot, no strike through appreciated. Non pitting edema of B/L LE up to the tibial tuberosity, chronic venous stasis changes of lower leg b/l with superficial ulcers, no drainage, no malodor, no purulence, erythema of the feet b/l L> R. Jose bullae of the dorsal aspect of the right foot with serous drainage, no malodor      LABS:                        7.4    28.0  )-----------( 124      ( 16 Mar 2018 07:18 )             24.1     03-16    136  |  98  |  39<H>  ----------------------------<  155<H>  3.5   |  24  |  1.72<H>    Ca    8.5      16 Mar 2018 07:18  Phos  3.0     03-16  Mg     1.8     03-16    TPro  7.3  /  Alb  3.0<L>  /  TBili  4.9<H>  /  DBili  2.5<H>  /  AST  27  /  ALT  21  /  AlkPhos  138<H>  03-15    PT/INR - ( 16 Mar 2018 07:18 )   PT: 20.0 sec;   INR: 1.78          PTT - ( 16 Mar 2018 07:18 )  PTT:49.0 sec    Microbiology:    Culture - Other (collected 15 Mar 2018 15:16)  Source: .Other Right foot wound  Gram Stain (16 Mar 2018 09:35):    No organisms seen    Rare White blood cells  Preliminary Report (16 Mar 2018 09:35):    No growth to date    Culture in progress        Radiology and Additional Studies:    < from: US Duplex Venous Lower Ext Complete, Bilateral (03.12.18 @ 16:55) >  EXAM:  US DPLX LWR EXT VEINS COMPL BI                          PROCEDURE DATE:  03/12/2018                     INTERPRETATION:  VENOUS DUPLEX DOPPLER OF BOTH LOWER EXTREMITIES dated   3/12/2018 4:55 PM    INDICATION: 63-year-old male with bilaterallower extremity swelling,   left greater than right and cellulitis, rule out DVT    TECHNIQUE: Duplex Doppler evaluation including gray-scale ultrasound   imaging, color flow Doppler imaging, and Doppler spectral analysis of the   veins of both lowerextremities was performed.     COMPARISON: None    FINDINGS:    Thigh veins: The common femoral, femoral, popliteal, proximal greater   saphenous, and proximal deep femoral veins are patent and free of   thrombus bilaterally. The veins are normally compressible and have normal   phasic flow and augmentation response.    Calf veins: The paired peroneal and posterior tibial calf veins are   patent bilaterally.    There is soft tissue edema in both lower extremities.      IMPRESSION:    < end of copied text >

## 2018-03-16 NOTE — PROGRESS NOTE ADULT - SUBJECTIVE AND OBJECTIVE BOX
INTERVAL HPI/OVERNIGHT EVENTS: Continues on Dobutamine drip, weaned off Levophed.     SUBJECTIVE: Patient seen and examined at bedside. Pt more awake than yesterday. Able to have a conversation.     OBJECTIVE:    VITAL SIGNS:  ICU Vital Signs Last 24 Hrs  T(C): 36.6 (16 Mar 2018 10:00), Max: 36.6 (16 Mar 2018 10:00)  T(F): 97.9 (16 Mar 2018 10:00), Max: 97.9 (16 Mar 2018 10:00)  HR: 88 (16 Mar 2018 08:00) (78 - 96)  BP: 95/67 (16 Mar 2018 06:00) (80/54 - 96/64)  BP(mean): 75 (16 Mar 2018 06:00) (62 - 78)  ABP: 110/66 (16 Mar 2018 08:00) (96/60 - 124/74)  ABP(mean): 78 (16 Mar 2018 08:00) (64 - 88)  RR: 18 (16 Mar 2018 08:00) (17 - 25)  SpO2: 99% (16 Mar 2018 08:00) (93% - 100%)        03-15 @ 07:01  -  03-16 @ 07:00  --------------------------------------------------------  IN: 1017.3 mL / OUT: 1626 mL / NET: -608.7 mL    03-16 @ 07:01 - 03-16 @ 10:53  --------------------------------------------------------  IN: 4.8 mL / OUT: 75 mL / NET: -70.2 mL      CAPILLARY BLOOD GLUCOSE      POCT Blood Glucose.: 158 mg/dL (16 Mar 2018 05:42)      PHYSICAL EXAM:    General: Mental status improved compared to yesterday's exam however still drowsy.   HEENT: NC/AT, PERRL  Neck: supple  Respiratory: Diffuse crackles.   Cardiac: Tachycardic, +S1/S2, no murmurs, rubs, or gallops  Gastrointestinal: soft, NT/ND; no rebound or guarding; +BS  Genitourinary: Testicular edema   Extremities: Warm to touch. Bilateral chronic venous stasis areas of skin breaks. R ankles with dry dressing. SCDs in place.   Musculoskeletal: NROM x4; no joint swelling, tenderness or erythema    MEDICATIONS:  MEDICATIONS  (STANDING):  albumin human 25% IVPB 50 milliLiter(s) IV Intermittent every 6 hours  bisacodyl 5 milliGRAM(s) Oral daily  dextrose 5%. 1000 milliLiter(s) (50 mL/Hr) IV Continuous <Continuous>  dextrose 50% Injectable 12.5 Gram(s) IV Push once  dextrose 50% Injectable 25 Gram(s) IV Push once  dextrose 50% Injectable 25 Gram(s) IV Push once  DOBUTamine Infusion 2 MICROgram(s)/kG/Min (4.8 mL/Hr) IV Continuous <Continuous>  folic acid 1 milliGRAM(s) Oral daily  heparin  Infusion 200 Unit(s)/Hr (2 mL/Hr) IV Continuous <Continuous>  hydrocortisone sodium succinate Injectable 50 milliGRAM(s) IV Push every 6 hours  insulin glargine Injectable (LANTUS) 6 Unit(s) SubCutaneous at bedtime  insulin lispro (HumaLOG) corrective regimen sliding scale   SubCutaneous Before meals and at bedtime  multivitamin 1 Tablet(s) Oral daily  piperacillin/tazobactam IVPB. 3.375 Gram(s) IV Intermittent every 6 hours  thiamine 100 milliGRAM(s) Oral daily    MEDICATIONS  (PRN):  acetaminophen   Tablet. 650 milliGRAM(s) Oral every 6 hours PRN Moderate Pain (4 - 6)  dextrose Gel 1 Dose(s) Oral once PRN Blood Glucose LESS THAN 70 milliGRAM(s)/deciliter  glucagon  Injectable 1 milliGRAM(s) IntraMuscular once PRN Glucose LESS THAN 70 milligrams/deciliter      ALLERGIES:  Allergies    No Known Allergies    Intolerances        LABS:                        7.4    28.0  )-----------( 124      ( 16 Mar 2018 07:18 )             24.1     03-16    136  |  98  |  39<H>  ----------------------------<  155<H>  3.5   |  24  |  1.72<H>    Ca    8.5      16 Mar 2018 07:18  Phos  3.0     03-16  Mg     1.8     03-16    TPro  7.3  /  Alb  3.0<L>  /  TBili  4.9<H>  /  DBili  2.5<H>  /  AST  27  /  ALT  21  /  AlkPhos  138<H>  03-15    PT/INR - ( 16 Mar 2018 07:18 )   PT: 20.0 sec;   INR: 1.78          PTT - ( 16 Mar 2018 07:18 )  PTT:49.0 sec  Urinalysis Basic - ( 15 Mar 2018 09:29 )    Color: Yellow / Appearance: Cloudy / SG: >=1.030 / pH: x  Gluc: x / Ketone: Trace mg/dL  / Bili: Moderate / Urobili: 1.0 E.U./dL   Blood: x / Protein: 100 mg/dL / Nitrite: NEGATIVE   Leuk Esterase: Small / RBC: Many /HPF / WBC < 5 /HPF   Sq Epi: x / Non Sq Epi: 0-5 /HPF / Bacteria: Present /HPF        RADIOLOGY & ADDITIONAL TESTS: Reviewed.

## 2018-03-16 NOTE — PROGRESS NOTE ADULT - PROBLEM SELECTOR PLAN 4
Anemia of chronic renal disease with hemoglobin 7.4  at present.     Not iron deficient.  Ferritin: 1377 and T sat% 34.  Haptoglobin and LDH reviewed  to assess TMA which are normal  Work up for paraproteinemia (SPEP, Serum immunofixation and Serum free light chain ratio) due to proteinuria and hematuria.  No EPO or iron at present due to probable GILMER vs CKD.  Monitor hemoglobin for now  Consider hematology/ID evaluation for Persistent leukocytosis and hypothermia  for further evaluation.  Transfuse PRBC per primary team

## 2018-03-16 NOTE — PROGRESS NOTE ADULT - ASSESSMENT
63 M with hx of MI, CHF EF(10-15%), DM type 2, HTN, CKD, Gout p/w septic shock and bilateral LL cellulitis, tachycardic, WBC 32, lactate 6. CT of LL did not revealed any gas/collections. Admitted to MICU for further management.  -Repeat CT 3/12 negative for nec fasc., Duplex US negative for DVT b/l, whole body gallium scan ordered, f/u  -Hypothermic and more lethargic today. WBC Count trending down: 28.0 K/uL (03-16 @ 07:18), Lactate, Blood: 2.6 mmoL/L (03-14 @ 01:22), 4.8 mmoL/L (03-13 @ 00:36)   Plan:  -Dr. Duarte discussed with pt the possible need for amputation, if condition worsens. Patient reports that under no circumstance that he would consent to lower extremity amputation.  -F/u cx from clear drainage from R foot  -F/u gallium scan   -Cont IV abx, as per primary team   -No plan for amputation at this time  -Remainder of care per primary team  -Vascular will follow, please call [phone] for questions or acute changes

## 2018-03-16 NOTE — PROGRESS NOTE ADULT - PROBLEM SELECTOR PLAN 2
Acute on chronic systolic CHF with LVEF 15 % and severely low blood pressure  Plan per cardiology/primary ICU team for inotropic support  for now.  Fluid restriction to <1L/day with drip and volume concentration  Daily weight  Strict I/o monitoring.  Monitor renal/urine out put.

## 2018-03-16 NOTE — BEHAVIORAL HEALTH ASSESSMENT NOTE - NSBHADMITCOUNSEL_PSY_A_CORE
client/family/caregiver education/importance of adherence to chosen treatment/risks and benefits of treatment options/instructions for management, treatment and follow up/risk factor reduction/diagnostic results/impressions and/or recommended studies/prognosis

## 2018-03-16 NOTE — CHART NOTE - NSCHARTNOTEFT_GEN_A_CORE
Spoke with HCP, patient's daughter, regarding GOC. She has confirmed the patient is FULL CODE. Regarding a feeding tube (if deemed necessary) she has provided consent.

## 2018-03-16 NOTE — PROGRESS NOTE ADULT - SUBJECTIVE AND OBJECTIVE BOX
Patient is a 63y Male seen and evaluated at bedside.  Patient remains critically ill in MICU with inotropic support at present and on CVVHD at present.  Renal function and improved on CVVHD but patient still remains with Oligoanuria at present. Await gallium scan at present. Interval removal of chest tube drain. Chest xray with bilateral pleural effusion at present. Remains on IV antibiotics with no growth in cultures at present for possible cellulitis and/or other etiologies.       acetaminophen   Tablet. 650 every 6 hours PRN  albumin human 25% IVPB 50 every 6 hours  bisacodyl 5 daily  dextrose 5%. 1000 <Continuous>  dextrose 50% Injectable 12.5 once  dextrose 50% Injectable 25 once  dextrose 50% Injectable 25 once  dextrose Gel 1 once PRN  DOBUTamine Infusion 2 <Continuous>  folic acid 1 daily  glucagon  Injectable 1 once PRN  heparin  Infusion 200 <Continuous>  hydrocortisone sodium succinate Injectable 50 every 6 hours  insulin glargine Injectable (LANTUS) 6 at bedtime  insulin lispro (HumaLOG) corrective regimen sliding scale  Before meals and at bedtime  multivitamin 1 daily  piperacillin/tazobactam IVPB. 3.375 every 6 hours  thiamine 100 daily      Allergies    No Known Allergies    Intolerances        T(C): , Max: 36.6 (03-16-18 @ 10:00)  T(F): , Max: 97.9 (03-16-18 @ 10:00)  HR: 88 (03-16-18 @ 08:00)  BP: 95/67 (03-16-18 @ 06:00)  BP(mean): 75 (03-16-18 @ 06:00)  RR: 18 (03-16-18 @ 08:00)  SpO2: 99% (03-16-18 @ 08:00)  Wt(kg): --    03-15 @ 07:01  -  03-16 @ 07:00  --------------------------------------------------------  IN: 1017.3 mL / OUT: 1626 mL / NET: -608.7 mL    03-16 @ 07:01  -  03-16 @ 11:11  --------------------------------------------------------  IN: 4.8 mL / OUT: 75 mL / NET: -70.2 mL          Review of Systems:  CONSTITUTIONAL: Fatigue and tired, No fever and chills,  EYES: No blurred or double vision.  RESPIRATORY: Mild shortness of breath, Denies any cough, hemoptysis  CARDIOVASCULAR: No Chest pain, palpitations, dizziness  GASTROINTESTINAL: No abdominal or flank pain, No nausea or vomiting, No diarrhea  GENITOURINARY: No dysuria or urinary burning, No difficulty passing urine, No hematuria  NEUROLOGICAL: No headaches or blurred vision  SKIN: No skin rashes   MUSCULOSKELETAL: Bilateral leg edema upto thigh, no pain or ulcer.       PHYSICAL EXAM:  GENERAL: Ill appearing, lying in bed, alert and awake in no acute distress at present  HEAD:  Atraumatic, Normocephalic,   EYES: Bilateral conjuctival and scleral pallor+nt  Oral cavity: Oral mucosa dry and pale  NECK: Neck supple, Mild JPV  CHEST/LUNG: Bilateral decreased breath sounds, Bibasilar rales and crepitations+nt, no wheezing.  HEART: Regular rate and rhythm. HOMER II/VI at LPSB, No gallop, no rub   ABDOMEN: Soft, Nontender, non distended, BS+nt, No flank tenderness.   EXTREMITIES: Bilateral chronic venous stasis dermatitis with pitting edema upto thigh noted. No clubbing, cyanosis  Neurology: AAOx3, lethargic and fatigue, no focal neurological deficit  SKIN: No rashes or lesions          ACCESS:     LABS:                        7.4    28.0  )-----------( 124      ( 16 Mar 2018 07:18 )             24.1     03-16    136  |  98  |  39<H>  ----------------------------<  155<H>  3.5   |  24  |  1.72<H>    Ca    8.5      16 Mar 2018 07:18  Phos  3.0     03-16  Mg     1.8     03-16    TPro  7.3  /  Alb  3.0<L>  /  TBili  4.9<H>  /  DBili  2.5<H>  /  AST  27  /  ALT  21  /  AlkPhos  138<H>  03-15    C3 Complement, Serum: 52 mg/dL <L> [80 - 180] (03-15 @ 16:06)  C4 Complement, Serum: 14 mg/dL [10 - 45] (03-15 @ 16:06)    PT/INR - ( 16 Mar 2018 07:18 )   PT: 20.0 sec;   INR: 1.78          PTT - ( 16 Mar 2018 07:18 )  PTT:49.0 sec  Urinalysis Basic - ( 15 Mar 2018 09:29 )    Color: Yellow / Appearance: Cloudy / SG: >=1.030 / pH: x  Gluc: x / Ketone: Trace mg/dL  / Bili: Moderate / Urobili: 1.0 E.U./dL   Blood: x / Protein: 100 mg/dL / Nitrite: NEGATIVE   Leuk Esterase: Small / RBC: Many /HPF / WBC < 5 /HPF   Sq Epi: x / Non Sq Epi: 0-5 /HPF / Bacteria: Present /HPF      Creatinine, Random Urine: 217 mg/dL (03-15 @ 09:29)  Protein/Creatinine Ratio Calculation: 0.9 Ratio (03-15 @ 09:29)        RADIOLOGY & ADDITIONAL STUDIES:  < from: Xray Chest 1 View- PORTABLE-Urgent (03.15.18 @ 06:59) >    EXAM:  XR CHEST PORTABLE URGENT 1V                          PROCEDURE DATE:  03/15/2018                     INTERPRETATION:  Chest portable    History: cough, sepsis, follow up abnormal exam    Grossly similar to prior exam with pleural effusions, left more than   right again noted compared to prior exam 3/14/18.            "Thank you for the opportunity to participate in the care of this   patient."        HESHAM BERRIOS M.D., ATTENDING RADIOLOGIST  This document has been electronically signed. Mar 15 2018 12:39PM                  < end of copied text >

## 2018-03-16 NOTE — PROGRESS NOTE ADULT - PROBLEM SELECTOR PLAN 1
Oligoanuric GILMER likely ischemic ATN with septic/cardiogenic shock  vs post infectious GN from possible chronic leg wound with possible staphylococal infection related with low C3 level now on RRT.    Plan:  Avoid Nephrotoxic agents.  Strict I/o for now  Urine PCR:0.9, C3 low, normal C4 level, Free light chains elevated with normal ratio 1.1, Hepatitis panel, RPR negative, HIV negative.   Await Immunofixation and SPEP, JAY for now.  Monitor for renal recovery for now  Strict I/o for now  If no improvement have persistent nephritic sediments may require renal biopsy  to establish underlying etiology of renal failure.  Adjust antibiotics as per renal dose clearance with Cr cl<10ml/min for now.  Will discontinue CVVHD for now  Will do UF only IHD today for volume removal.  Continue inotropic support and IV pressors for now per primary ICU/cardiology team

## 2018-03-16 NOTE — PROGRESS NOTE ADULT - SUBJECTIVE AND OBJECTIVE BOX
Patient was seen and evaluated on dialysis.   Patient is tolerating the procedure well.   HR: 96 (03-16-18 @ 18:30)  BP: 95/67 (03-16-18 @ 06:00)  Continue dialysis:   Dialyzer: Revaclear 300 QB: 250    Only UF of 1.5 kg over 2.5 hours

## 2018-03-16 NOTE — CONSULT NOTE ADULT - SUBJECTIVE AND OBJECTIVE BOX
HPI:  62 yo M history of HFrEF 10-15% 2/2 ischemic cardiomyopathy, MI , s/p AICD vs PPM, ?Afib, Hypertension, Diabetes Mellitus Type 2 on insulin, CKD?and gout, who presents with a chief complaint of generalized weakness. Pt giana admitted to [facility] 2 months ago for gout and was sent to rehab. He was discharged home mid Feb with VNS. In the past 2 weeks patient has noted increased leg pain and weakness bilaterally. few days prior to admission, he has also experienced generalized weakness prompting him to come to ER.   In ER, noted to have t max of 102, SBP 70s, leukocytosis to 32.8, Lactate of 6.6, Acute renal failure with severe metabolic derangements. Given 3.5L fluids and Vanc/Zosyn. (10 Mar 2018 18:51)  Patient was admitted to MICU for severe sepsis where he was started on Antibiotics and pressors     ROS: Negative except as above    PAST MEDICAL & SURGICAL HISTORY:  Type 2 diabetes mellitus with diabetic peripheral angiopathy without gangrene, with long-term curren  Essential hypertension, benign  Gout  Pacemaker  Chronic systolic heart failure  Myocardial infarction  No significant past surgical history      SOCIAL HISTORY:  FAMILY HISTORY:      ALLERGIES: 	  Allergies    No Known Allergies    Intolerances        MEDICATIONS:  acetaminophen   Tablet. 650 milliGRAM(s) Oral every 6 hours PRN  albumin human 25% IVPB 50 milliLiter(s) IV Intermittent every 1 hour  albumin human 25% IVPB 50 milliLiter(s) IV Intermittent every 6 hours  bisacodyl 5 milliGRAM(s) Oral daily  dextrose 5%. 1000 milliLiter(s) IV Continuous <Continuous>  dextrose 50% Injectable 12.5 Gram(s) IV Push once  dextrose 50% Injectable 25 Gram(s) IV Push once  dextrose 50% Injectable 25 Gram(s) IV Push once  dextrose Gel 1 Dose(s) Oral once PRN  DOBUTamine Infusion 2 MICROgram(s)/kG/Min IV Continuous <Continuous>  folic acid 1 milliGRAM(s) Oral daily  glucagon  Injectable 1 milliGRAM(s) IntraMuscular once PRN  heparin  Infusion 200 Unit(s)/Hr IV Continuous <Continuous>  hydrocortisone sodium succinate Injectable 50 milliGRAM(s) IV Push every 6 hours  insulin glargine Injectable (LANTUS) 6 Unit(s) SubCutaneous at bedtime  insulin lispro (HumaLOG) corrective regimen sliding scale   SubCutaneous Before meals and at bedtime  multivitamin 1 Tablet(s) Oral daily  piperacillin/tazobactam IVPB. 2.25 Gram(s) IV Intermittent every 8 hours  thiamine 100 milliGRAM(s) Oral daily  vancomycin  IVPB 1250 milliGRAM(s) IV Intermittent once      PHYSICAL EXAM:  T(C): 36.6 (03-16-18 @ 10:00), Max: 36.6 (03-16-18 @ 10:00)  HR: 86 (03-16-18 @ 14:00) (78 - 96)  BP: 95/67 (03-16-18 @ 06:00) (82/58 - 96/64)  RR: 15 (03-16-18 @ 14:00) (15 - 25)  SpO2: 97% (03-16-18 @ 14:00) (93% - 100%)  Wt(kg): --    General: patient is lethargic and sleepy. Barely following commands  HEENT: NC/AT, PERRL  Neck: supple  Respiratory: Diffuse crackles.   Cardiac: Tachycardic, +S1/S2, no murmurs, rubs, or gallops  Gastrointestinal: soft, NT/ND; no rebound or guarding; +BS  Genitourinary: Testicular edema   Extremities: Warm to touch. Bilateral chronic venous stasis areas of skin breaks. R ankles with dry dressing. SCDs in place.   Musculoskeletal: NROM x4; no joint swelling, tenderness or erythema    I&O's Summary    15 Mar 2018 07:01  -  16 Mar 2018 07:00  --------------------------------------------------------  IN: 1017.3 mL / OUT: 1626 mL / NET: -608.7 mL    16 Mar 2018 07:01  -  16 Mar 2018 14:34  --------------------------------------------------------  IN: 4.8 mL / OUT: 75 mL / NET: -70.2 mL        	  LABS:	 	    CARDIAC MARKERS:                                  7.0    24.8  )-----------( 127      ( 16 Mar 2018 13:31 )             22.3     03-16    137  |  100  |  39<H>  ----------------------------<  151<H>  3.5   |  21<L>  |  1.85<H>    Ca    8.2<L>      16 Mar 2018 13:31  Phos  3.1     03-16  Mg     1.8     03-16    TPro  5.8<L>  /  Alb  x   /  TBili  x   /  DBili  x   /  AST  x   /  ALT  x   /  AlkPhos  x   03-15      	    ECG:    < from: 12 Lead ECG (03.10.18 @ 13:17) >  Ventricular Rate 125 BPM    Atrial Rate 141 BPM    QRS Duration 76 ms    Q-T Interval 284 ms    QTC Calculation(Bezet) 409 ms    R Axis 261 degrees    T Axis 23 degrees    Diagnosis Line Accelerated Junctional rhythm with fusion complexes  Low voltage QRS  Inferior infarct , age undetermined  Possible Anterolateral infarct , age undetermined  Abnormal ECG      < from: Echocardiogram (03.12.18 @ 14:19) >  Interpretation Summary  Preserved left ventricular thickness. The left ventricle is borderline   dilated.   Severe hypokinesis of the left ventricle. Contractility is preserved in   basal   anterior, basal inferior, and basal inferolateral. The left ventricular   ejection fraction is severely reduced.  The left ventricular ejection   fraction   is 15%.The right ventricle is borderline dilated. The right ventricular   systolic function is mildly reduced.  The left atrium is mildly dilated.   The   left atrial volume index is 35 cc/m2 (normal <34cc/m2)  The right atrium   is   mildly dilated. Mildly calcified trileaflet aortic valve. There is trace   aortic regurgitation.  Tethered mitral valve leaflets with normal   opening.There is mild mitral regurgitation.Structurally normal tricuspid   valve. There is moderate tricuspid regurgitation. There is moderate   pulmonary   hypertension. The pulmonary artery systolic pressure is estimated to be   54   mmHg.  Structurally normal pulmonic valve. There is trace pulmonic   regurgitation.A small pericardial effusion noted. Bilateral pleural   effusion   noted.No prior echo is available for comparison. HPI:  64 yo M history of HFrEF 10-15% 2/2 ischemic cardiomyopathy, MI , s/p AICD, ?Afib, Hypertension, Diabetes Mellitus Type 2 on insulin, CKD?and gout, who presents with a chief complaint of generalized weakness. Pt giana admitted to [facility] 2 months ago for gout and was sent to rehab. He was discharged home mid Feb with VNS. In the past 2 weeks patient has noted increased leg pain and weakness bilaterally. few days prior to admission, he has also experienced generalized weakness prompting him to come to ER.   In ER, noted to have t max of 102, SBP 70s, leukocytosis to 32.8, Lactate of 6.6, Acute renal failure with severe metabolic derangements. Given 3.5L fluids and Vanc/Zosyn. (10 Mar 2018 18:51)  Patient was admitted to MICU for severe sepsis where he was started on Antibiotics and pressors     ROS: Negative except as above    PAST MEDICAL & SURGICAL HISTORY:  Type 2 diabetes mellitus with diabetic peripheral angiopathy without gangrene, with long-term curren  Essential hypertension, benign  Gout  Pacemaker  Chronic systolic heart failure  Myocardial infarction  No significant past surgical history      SOCIAL HISTORY:  FAMILY HISTORY:      ALLERGIES: 	  Allergies    No Known Allergies    Intolerances        MEDICATIONS:  acetaminophen   Tablet. 650 milliGRAM(s) Oral every 6 hours PRN  albumin human 25% IVPB 50 milliLiter(s) IV Intermittent every 1 hour  albumin human 25% IVPB 50 milliLiter(s) IV Intermittent every 6 hours  bisacodyl 5 milliGRAM(s) Oral daily  dextrose 5%. 1000 milliLiter(s) IV Continuous <Continuous>  dextrose 50% Injectable 12.5 Gram(s) IV Push once  dextrose 50% Injectable 25 Gram(s) IV Push once  dextrose 50% Injectable 25 Gram(s) IV Push once  dextrose Gel 1 Dose(s) Oral once PRN  DOBUTamine Infusion 2 MICROgram(s)/kG/Min IV Continuous <Continuous>  folic acid 1 milliGRAM(s) Oral daily  glucagon  Injectable 1 milliGRAM(s) IntraMuscular once PRN  heparin  Infusion 200 Unit(s)/Hr IV Continuous <Continuous>  hydrocortisone sodium succinate Injectable 50 milliGRAM(s) IV Push every 6 hours  insulin glargine Injectable (LANTUS) 6 Unit(s) SubCutaneous at bedtime  insulin lispro (HumaLOG) corrective regimen sliding scale   SubCutaneous Before meals and at bedtime  multivitamin 1 Tablet(s) Oral daily  piperacillin/tazobactam IVPB. 2.25 Gram(s) IV Intermittent every 8 hours  thiamine 100 milliGRAM(s) Oral daily  vancomycin  IVPB 1250 milliGRAM(s) IV Intermittent once      PHYSICAL EXAM:  T(C): 36.6 (03-16-18 @ 10:00), Max: 36.6 (03-16-18 @ 10:00)  HR: 86 (03-16-18 @ 14:00) (78 - 96)  BP: 95/67 (03-16-18 @ 06:00) (82/58 - 96/64)  RR: 15 (03-16-18 @ 14:00) (15 - 25)  SpO2: 97% (03-16-18 @ 14:00) (93% - 100%)  Wt(kg): --    General: patient is lethargic and sleepy. Barely following commands  HEENT: NC/AT, PERRL  Neck: supple  Respiratory: Diffuse crackles.   Cardiac: Tachycardic, +S1/S2, no murmurs, rubs, or gallops  Gastrointestinal: soft, NT/ND; no rebound or guarding; +BS  Genitourinary: Testicular edema   Extremities: Warm to touch. Bilateral chronic venous stasis areas of skin breaks. R ankles with dry dressing. SCDs in place.   Musculoskeletal: NROM x4; no joint swelling, tenderness or erythema    I&O's Summary    15 Mar 2018 07:01  -  16 Mar 2018 07:00  --------------------------------------------------------  IN: 1017.3 mL / OUT: 1626 mL / NET: -608.7 mL    16 Mar 2018 07:01  -  16 Mar 2018 14:34  --------------------------------------------------------  IN: 4.8 mL / OUT: 75 mL / NET: -70.2 mL        	  LABS:	 	    CARDIAC MARKERS:                                  7.0    24.8  )-----------( 127      ( 16 Mar 2018 13:31 )             22.3     03-16    137  |  100  |  39<H>  ----------------------------<  151<H>  3.5   |  21<L>  |  1.85<H>    Ca    8.2<L>      16 Mar 2018 13:31  Phos  3.1     03-16  Mg     1.8     03-16    TPro  5.8<L>  /  Alb  x   /  TBili  x   /  DBili  x   /  AST  x   /  ALT  x   /  AlkPhos  x   03-15      	    ECG:    < from: 12 Lead ECG (03.10.18 @ 13:17) >  Ventricular Rate 125 BPM    Atrial Rate 141 BPM    QRS Duration 76 ms    Q-T Interval 284 ms    QTC Calculation(Bezet) 409 ms    R Axis 261 degrees    T Axis 23 degrees    Diagnosis Line Accelerated Junctional rhythm with fusion complexes  Low voltage QRS  Inferior infarct , age undetermined  Possible Anterolateral infarct , age undetermined  Abnormal ECG      < from: Echocardiogram (03.12.18 @ 14:19) >  Interpretation Summary  Preserved left ventricular thickness. The left ventricle is borderline   dilated.   Severe hypokinesis of the left ventricle. Contractility is preserved in   basal   anterior, basal inferior, and basal inferolateral. The left ventricular   ejection fraction is severely reduced.  The left ventricular ejection   fraction   is 15%.The right ventricle is borderline dilated. The right ventricular   systolic function is mildly reduced.  The left atrium is mildly dilated.   The   left atrial volume index is 35 cc/m2 (normal <34cc/m2)  The right atrium   is   mildly dilated. Mildly calcified trileaflet aortic valve. There is trace   aortic regurgitation.  Tethered mitral valve leaflets with normal   opening.There is mild mitral regurgitation.Structurally normal tricuspid   valve. There is moderate tricuspid regurgitation. There is moderate   pulmonary   hypertension. The pulmonary artery systolic pressure is estimated to be   54   mmHg.  Structurally normal pulmonic valve. There is trace pulmonic   regurgitation.A small pericardial effusion noted. Bilateral pleural   effusion   noted.No prior echo is available for comparison.

## 2018-03-16 NOTE — CHART NOTE - NSCHARTNOTEFT_GEN_A_CORE
Pt refused Hemodialysis around 4pm. PT was AOx3 and understood risks/benefits of not receiving dialysis. Had a discussion with the patient regarding possible need for Resuscitation and/or Intubation in the future. Pt endorse not wanting to be intubated or  resuscitated. Psych was consulted to determine capacity and evaluate for underlying mood disorder. Pt deemed not to have capacity. HCP daughter called to consent for dialysis. Will discuss DNR/DNI status once daughter arrives. Palliative consulted to discuss GOC with patient and family. Pt refused Hemodialysis around 4pm. PT was AOx3 and understood risks/benefits of not receiving dialysis. Had a discussion with the patient regarding possible need for Resuscitation and/or Intubation in the future. Pt endorsed not wanting to be intubated or  resuscitated. Psych was consulted to determine capacity and evaluate for underlying mood disorder. Pt deemed not to have capacity. HCP daughter called to consent for dialysis. DNR/DNI status discussed with daughter in person. Says she had end of life care discussion with the patient in the past and he had endorsed wanting to be intubated and resuscitated If it was determined that doing so would help his prognosis and that he would recover. Palliative consulted to also discuss GOC with patient and family.

## 2018-03-16 NOTE — BEHAVIORAL HEALTH ASSESSMENT NOTE - NSBHCHARTREVIEWVS_PSY_A_CORE FT
Vital Signs Last 24 Hrs  T(C): 36.8 (16 Mar 2018 14:00), Max: 36.8 (16 Mar 2018 14:00)  T(F): 98.3 (16 Mar 2018 14:00), Max: 98.3 (16 Mar 2018 14:00)  HR: 94 (16 Mar 2018 17:00) (78 - 100)  BP: 95/67 (16 Mar 2018 06:00) (82/58 - 96/64)  BP(mean): 75 (16 Mar 2018 06:00) (66 - 76)  RR: 20 (16 Mar 2018 17:00) (15 - 25)  SpO2: 95% (16 Mar 2018 17:00) (94% - 100%)

## 2018-03-16 NOTE — PROGRESS NOTE ADULT - ATTENDING COMMENTS
Patient seen and examined with house-staff during bedside rounds.  Resident note read, including vitals, physical findings, laboratory data, and radiological reports.   Revisions included below.  Direct personal management at bed side and extensive interpretation of the data.  Plan was outlined and discussed in details with the housestaff.  Decision making of high complexity  Action taken for acute disease activity to reflect the level of care provided:  - medication reconciliation  - review laboratory data  - Off pressors  - SVO2 increased on dobutamine  - CHF service  -gallium tomorrow  - on heparin  - Change to intermittent dialysis  - WBC decreased on antibiotics

## 2018-03-16 NOTE — PROGRESS NOTE ADULT - PROBLEM SELECTOR PLAN 3
Increased aniongap metabolic acidosis likely due to underlying renal disease  No indication for IV bicarbonate at present.  Continue sodium bicarbonate 650mg po tid for now  Will switch to IHD today for UF only treatment for volume removal.

## 2018-03-16 NOTE — PROGRESS NOTE ADULT - PROBLEM SELECTOR PLAN 5
Patient's Calcium 8.0 and phosphorus 3.0 at present.  Obtain intact PTH, VItamin D 25 and Vitamin D 1,25 level for now  Low phos/renal diet.  Monitor BMP daily for now.

## 2018-03-16 NOTE — BEHAVIORAL HEALTH ASSESSMENT NOTE - SUMMARY
63M no known PPH, recently more medically ill with prolonged hospitalization, now again admitted with condition worsening and initially refusing dialysis.  Unable to assess patient's capacity as he did not participate in interview and eventually did agree to dialysis.  Patient appears to have underlying depressed mood complicated by delirium in the setting of multiple medical issues.  Best case scenario is patient continues to agree to dialysis; if patient changes his mind again would enlist friends and family to sway him.  If need for dialysis is emergent and patient again refuses without stating risks he does not have capacity to refuse.  Agree with palliative care consult.  Will defer starting antidepressant until patient more able to participate in interview.

## 2018-03-17 LAB
ANION GAP SERPL CALC-SCNC: 15 MMOL/L — SIGNIFICANT CHANGE UP (ref 5–17)
ANION GAP SERPL CALC-SCNC: 17 MMOL/L — SIGNIFICANT CHANGE UP (ref 5–17)
APTT BLD: 47.2 SEC — HIGH (ref 27.5–37.4)
APTT BLD: 48.4 SEC — HIGH (ref 27.5–37.4)
APTT BLD: 53.7 SEC — HIGH (ref 27.5–37.4)
BUN SERPL-MCNC: 48 MG/DL — HIGH (ref 7–23)
BUN SERPL-MCNC: 50 MG/DL — HIGH (ref 7–23)
CALCIUM SERPL-MCNC: 8.3 MG/DL — LOW (ref 8.4–10.5)
CALCIUM SERPL-MCNC: 8.5 MG/DL — SIGNIFICANT CHANGE UP (ref 8.4–10.5)
CHLORIDE SERPL-SCNC: 97 MMOL/L — SIGNIFICANT CHANGE UP (ref 96–108)
CHLORIDE SERPL-SCNC: 98 MMOL/L — SIGNIFICANT CHANGE UP (ref 96–108)
CO2 SERPL-SCNC: 19 MMOL/L — LOW (ref 22–31)
CO2 SERPL-SCNC: 22 MMOL/L — SIGNIFICANT CHANGE UP (ref 22–31)
CREAT SERPL-MCNC: 2.23 MG/DL — HIGH (ref 0.5–1.3)
CREAT SERPL-MCNC: 2.41 MG/DL — HIGH (ref 0.5–1.3)
CULTURE RESULTS: NO GROWTH — SIGNIFICANT CHANGE UP
GAS PNL BLDV: SIGNIFICANT CHANGE UP
GLUCOSE BLDC GLUCOMTR-MCNC: 158 MG/DL — HIGH (ref 70–99)
GLUCOSE BLDC GLUCOMTR-MCNC: 171 MG/DL — HIGH (ref 70–99)
GLUCOSE BLDC GLUCOMTR-MCNC: 202 MG/DL — HIGH (ref 70–99)
GLUCOSE BLDC GLUCOMTR-MCNC: 205 MG/DL — HIGH (ref 70–99)
GLUCOSE SERPL-MCNC: 218 MG/DL — HIGH (ref 70–99)
GLUCOSE SERPL-MCNC: 229 MG/DL — HIGH (ref 70–99)
HCT VFR BLD CALC: 27.6 % — LOW (ref 39–50)
HGB BLD-MCNC: 8.8 G/DL — LOW (ref 13–17)
MAGNESIUM SERPL-MCNC: 1.9 MG/DL — SIGNIFICANT CHANGE UP (ref 1.6–2.6)
MCHC RBC-ENTMCNC: 29.7 PG — SIGNIFICANT CHANGE UP (ref 27–34)
MCHC RBC-ENTMCNC: 31.9 G/DL — LOW (ref 32–36)
MCV RBC AUTO: 93.2 FL — SIGNIFICANT CHANGE UP (ref 80–100)
PHOSPHATE SERPL-MCNC: 3.4 MG/DL — SIGNIFICANT CHANGE UP (ref 2.5–4.5)
PLATELET # BLD AUTO: 116 K/UL — LOW (ref 150–400)
POTASSIUM SERPL-MCNC: 3.4 MMOL/L — LOW (ref 3.5–5.3)
POTASSIUM SERPL-MCNC: 3.6 MMOL/L — SIGNIFICANT CHANGE UP (ref 3.5–5.3)
POTASSIUM SERPL-SCNC: 3.4 MMOL/L — LOW (ref 3.5–5.3)
POTASSIUM SERPL-SCNC: 3.6 MMOL/L — SIGNIFICANT CHANGE UP (ref 3.5–5.3)
RBC # BLD: 2.96 M/UL — LOW (ref 4.2–5.8)
RBC # FLD: 16.6 % — SIGNIFICANT CHANGE UP (ref 10.3–16.9)
SODIUM SERPL-SCNC: 133 MMOL/L — LOW (ref 135–145)
SODIUM SERPL-SCNC: 135 MMOL/L — SIGNIFICANT CHANGE UP (ref 135–145)
SPECIMEN SOURCE: SIGNIFICANT CHANGE UP
VANCOMYCIN FLD-MCNC: 27 UG/ML
VANCOMYCIN TROUGH SERPL-MCNC: 22 UG/ML — HIGH (ref 10–20)
WBC # BLD: 22.4 K/UL — HIGH (ref 3.8–10.5)
WBC # FLD AUTO: 22.4 K/UL — HIGH (ref 3.8–10.5)

## 2018-03-17 PROCEDURE — 71045 X-RAY EXAM CHEST 1 VIEW: CPT | Mod: 26,76

## 2018-03-17 PROCEDURE — 99291 CRITICAL CARE FIRST HOUR: CPT

## 2018-03-17 RX ORDER — HEPARIN SODIUM 5000 [USP'U]/ML
800 INJECTION INTRAVENOUS; SUBCUTANEOUS
Qty: 25000 | Refills: 0 | Status: DISCONTINUED | OUTPATIENT
Start: 2018-03-17 | End: 2018-03-18

## 2018-03-17 RX ORDER — QUETIAPINE FUMARATE 200 MG/1
12.5 TABLET, FILM COATED ORAL ONCE
Qty: 0 | Refills: 0 | Status: COMPLETED | OUTPATIENT
Start: 2018-03-17 | End: 2018-03-17

## 2018-03-17 RX ORDER — POTASSIUM CHLORIDE 20 MEQ
10 PACKET (EA) ORAL ONCE
Qty: 0 | Refills: 0 | Status: COMPLETED | OUTPATIENT
Start: 2018-03-17 | End: 2018-03-17

## 2018-03-17 RX ORDER — HEPARIN SODIUM 5000 [USP'U]/ML
500 INJECTION INTRAVENOUS; SUBCUTANEOUS
Qty: 25000 | Refills: 0 | Status: DISCONTINUED | OUTPATIENT
Start: 2018-03-17 | End: 2018-03-17

## 2018-03-17 RX ORDER — INSULIN GLARGINE 100 [IU]/ML
14 INJECTION, SOLUTION SUBCUTANEOUS AT BEDTIME
Qty: 0 | Refills: 0 | Status: DISCONTINUED | OUTPATIENT
Start: 2018-03-17 | End: 2018-03-18

## 2018-03-17 RX ADMIN — INSULIN GLARGINE 14 UNIT(S): 100 INJECTION, SOLUTION SUBCUTANEOUS at 22:00

## 2018-03-17 RX ADMIN — PIPERACILLIN AND TAZOBACTAM 200 GRAM(S): 4; .5 INJECTION, POWDER, LYOPHILIZED, FOR SOLUTION INTRAVENOUS at 06:44

## 2018-03-17 RX ADMIN — QUETIAPINE FUMARATE 12.5 MILLIGRAM(S): 200 TABLET, FILM COATED ORAL at 00:54

## 2018-03-17 RX ADMIN — HEPARIN SODIUM 6 UNIT(S)/HR: 5000 INJECTION INTRAVENOUS; SUBCUTANEOUS at 22:19

## 2018-03-17 RX ADMIN — HEPARIN SODIUM 6 UNIT(S)/HR: 5000 INJECTION INTRAVENOUS; SUBCUTANEOUS at 08:05

## 2018-03-17 RX ADMIN — Medication 50 MILLIGRAM(S): at 12:47

## 2018-03-17 RX ADMIN — Medication 4: at 04:46

## 2018-03-17 RX ADMIN — Medication 50 MILLILITER(S): at 07:35

## 2018-03-17 RX ADMIN — Medication 2: at 19:00

## 2018-03-17 RX ADMIN — Medication 50 MILLILITER(S): at 00:20

## 2018-03-17 RX ADMIN — Medication 50 MILLILITER(S): at 19:22

## 2018-03-17 RX ADMIN — Medication 4: at 06:44

## 2018-03-17 RX ADMIN — Medication 2: at 22:00

## 2018-03-17 RX ADMIN — Medication 50 MILLILITER(S): at 12:49

## 2018-03-17 RX ADMIN — Medication 50 MILLIGRAM(S): at 19:00

## 2018-03-17 RX ADMIN — Medication 9.6 MICROGRAM(S)/KG/MIN: at 02:24

## 2018-03-17 RX ADMIN — PIPERACILLIN AND TAZOBACTAM 200 GRAM(S): 4; .5 INJECTION, POWDER, LYOPHILIZED, FOR SOLUTION INTRAVENOUS at 14:15

## 2018-03-17 RX ADMIN — Medication 50 MILLIGRAM(S): at 23:42

## 2018-03-17 RX ADMIN — Medication 50 MILLIGRAM(S): at 06:44

## 2018-03-17 RX ADMIN — PIPERACILLIN AND TAZOBACTAM 200 GRAM(S): 4; .5 INJECTION, POWDER, LYOPHILIZED, FOR SOLUTION INTRAVENOUS at 22:01

## 2018-03-17 RX ADMIN — Medication 4: at 12:47

## 2018-03-17 NOTE — PROGRESS NOTE ADULT - SUBJECTIVE AND OBJECTIVE BOX
Patient seen and examined at bedside. Patient is a 63 year old male with CKD, systolic heart failure with EF 10-15%, s/p AICD, hypertension, diabetes mellitus and gout for whom nephrology was consulted for GILMER. Patient appears comfortable and in no acute distress.     acetaminophen   Tablet. 650 milliGRAM(s) every 6 hours PRN  albumin human 25% IVPB 50 milliLiter(s) every 1 hour  albumin human 25% IVPB 50 milliLiter(s) every 6 hours  bisacodyl 5 milliGRAM(s) daily  dextrose 5%. 1000 milliLiter(s) <Continuous>  dextrose 50% Injectable 12.5 Gram(s) once  dextrose 50% Injectable 25 Gram(s) once  dextrose 50% Injectable 25 Gram(s) once  dextrose Gel 1 Dose(s) once PRN  DOBUTamine Infusion 4 MICROgram(s)/kG/Min <Continuous>  folic acid 1 milliGRAM(s) daily  glucagon  Injectable 1 milliGRAM(s) once PRN  heparin  Infusion 800 Unit(s)/Hr <Continuous>  hydrocortisone sodium succinate Injectable 50 milliGRAM(s) every 6 hours  insulin glargine Injectable (LANTUS) 14 Unit(s) at bedtime  insulin lispro (HumaLOG) corrective regimen sliding scale   Before meals and at bedtime  multivitamin 1 Tablet(s) daily  piperacillin/tazobactam IVPB. 2.25 Gram(s) every 8 hours  potassium chloride    Tablet ER 10 milliEquivalent(s) once  thiamine 100 milliGRAM(s) daily    Allergies    No Known Allergies    Intolerances    T(C): , Max: 36.4 (03-17-18 @ 06:51)  T(F): , Max: 97.6 (03-17-18 @ 06:51)  HR: 84 (03-17-18 @ 13:00)  BP: 104/64  RR: 18 (03-17-18 @ 13:00)  SpO2: 100% (03-17-18 @ 13:00)    03-16 @ 07:01  -  03-17 @ 07:00  --------------------------------------------------------  IN:    Albumin 5%  - 250 mL: 200 mL    DOBUTamine Infusion: 33.6 mL    DOBUTamine Infusion: 163.2 mL    heparin Infusion: 52 mL    heparin Infusion: 30 mL    Solution: 300 mL  Total IN: 778.8 mL    OUT:    Other: 211 mL    Other: 1300 mL  Total OUT: 1511 mL    Total NET: -732.2 mL      03-17 @ 07:01  -  03-17 @ 14:26  --------------------------------------------------------  IN:    DOBUTamine Infusion: 38.4 mL    heparin Infusion: 23 mL  Total IN: 61.4 mL    OUT:  Total OUT: 0 mL    Total NET: 61.4 mL    LABS:                        8.8    22.4  )-----------( 116      ( 17 Mar 2018 07:19 )             27.6     03-17    135  |  98  |  50<H>  ----------------------------<  218<H>  3.4<L>   |  22  |  2.41<H>    Ca    8.5      17 Mar 2018 07:19  Phos  3.4     03-17  Mg     1.9     03-17    TPro  5.8<L>  /  Alb  x   /  TBili  x   /  DBili  x   /  AST  x   /  ALT  x   /  AlkPhos  x   03-15    PT/INR - ( 16 Mar 2018 13:31 )   PT: 19.6 sec;   INR: 1.75        PTT - ( 17 Mar 2018 13:16 )  PTT:47.2 sec

## 2018-03-17 NOTE — PROGRESS NOTE ADULT - ASSESSMENT
63 M with hx of MI, CHF EF(10-15%), DM type 2, HTN, CKD, Gout p/w septic shock and bilateral LL cellulitis, tachycardic, WBC 32, lactate 6. CT of LL did not revealed any gas/collections. Admitted to MICU for further management.  -Repeat CT 3/12 negative for nec fasc., Duplex US negative for DVT b/l, whole body gallium scan ordered   Plan:  -Dr. Duarte discussed with pt the possible need for amputation, if condition worsens. Patient reports that under no circumstance that he would consent to lower extremity amputation.  -F/u cx from clear drainage from R foot  -F/u gallium scan   -Cont IV abx, as per primary team   -No plan for amputation at this time  -Remainder of care per primary team  -Vascular will follow, please call [phone] for questions or acute changes

## 2018-03-17 NOTE — PROGRESS NOTE ADULT - SUBJECTIVE AND OBJECTIVE BOX
Patient was seen and evaluated on dialysis.   Patient is tolerating the procedure well.   HR: 86 (03-17-18 @ 17:05)  BP: 136/56  Continue dialysis:   Revaclear 300, , , 4 K   Goal UF of 1.5 kg over 2.5 hours

## 2018-03-17 NOTE — PROGRESS NOTE ADULT - PROBLEM SELECTOR PLAN 2
Patient has a history of systolic heart failure with EF of 15%   Currently on dobutamine.   Management as per primary team.

## 2018-03-17 NOTE — PROGRESS NOTE ADULT - SUBJECTIVE AND OBJECTIVE BOX
Patient seen by bedside. Appears less lethargic than yesterday. No change in improved LE pain. No complaints. No fevers, chills, n/v.     PMH:  Type 2 diabetes mellitus with diabetic peripheral angiopathy without gangrene, with long-term curren  Essential hypertension, benign  Gout  Pacemaker  Chronic systolic heart failure  Myocardial infarction      Medication:   piperacillin/tazobactam IVPB. 2.25  DOBUTamine Infusion 4  heparin  Infusion 800  piperacillin/tazobactam IVPB. 2.25      Physical Exam:  General: NAD, drowsy but improved from yesterday   Extremities: Dressings c/d/i to R foot, no strike through appreciated. Non pitting edema of B/L LE up to the tibial tuberosity, chronic venous stasis changes of lower leg b/l with superficial ulcers, no drainage, no malodor, no purulence, erythema of the feet b/l L> R. Deroofed bullae of the dorsal aspect of the right foot with serous drainage, no malodor      LABS:                        8.8    22.4  )-----------( 116      ( 17 Mar 2018 07:19 )             27.6     03-17    135  |  98  |  50<H>  ----------------------------<  218<H>  3.4<L>   |  22  |  2.41<H>    Ca    8.5      17 Mar 2018 07:19  Phos  3.4     03-17  Mg     1.9     03-17      PT/INR - ( 16 Mar 2018 13:31 )   PT: 19.6 sec;   INR: 1.75          PTT - ( 17 Mar 2018 23:53 )  PTT:65.8 sec    Radiology and Additional Studies:

## 2018-03-17 NOTE — PROGRESS NOTE ADULT - PROBLEM SELECTOR PLAN 1
Patient is a 63 year old male with oligoanuric GILMER likely from ischemic ATN from shock vs post infectious GN from leg wound. However in post infectious GN you would expect both C3 and C4 to be low which is not the case here. Patient's renal failure is likely due to ischemic ATN. Urine output not being monitored, no incontinent episodes documented. Patient was dialyzed for UF on 3/16.     P - will dialyze patient for both UF and clearance today  Revaclear 300, , , 4 K   Goal UF of 1.5 kg over 2.5 hours   Please check Serum immunofexation, SPEP, and JAY   Please monitor I/Os   Avoid nephrotoxic medications, IV contrast and NSAIDs

## 2018-03-18 LAB
ANA PAT FLD IF-IMP: (no result)
ANA TITR SER: (no result)
ANION GAP SERPL CALC-SCNC: 14 MMOL/L — SIGNIFICANT CHANGE UP (ref 5–17)
APTT BLD: 101.4 SEC — HIGH (ref 27.5–37.4)
APTT BLD: 62.1 SEC — HIGH (ref 27.5–37.4)
APTT BLD: 65.8 SEC — HIGH (ref 27.5–37.4)
APTT BLD: 97.4 SEC — HIGH (ref 27.5–37.4)
BASOPHILS NFR BLD AUTO: 0.1 % — SIGNIFICANT CHANGE UP (ref 0–2)
BUN SERPL-MCNC: 43 MG/DL — HIGH (ref 7–23)
CALCIUM SERPL-MCNC: 8.6 MG/DL — SIGNIFICANT CHANGE UP (ref 8.4–10.5)
CHLORIDE SERPL-SCNC: 98 MMOL/L — SIGNIFICANT CHANGE UP (ref 96–108)
CO2 SERPL-SCNC: 25 MMOL/L — SIGNIFICANT CHANGE UP (ref 22–31)
CREAT SERPL-MCNC: 2.62 MG/DL — HIGH (ref 0.5–1.3)
CULTURE RESULTS: NO GROWTH — SIGNIFICANT CHANGE UP
GAS PNL BLDV: SIGNIFICANT CHANGE UP
GLUCOSE BLDC GLUCOMTR-MCNC: 209 MG/DL — HIGH (ref 70–99)
GLUCOSE BLDC GLUCOMTR-MCNC: 238 MG/DL — HIGH (ref 70–99)
GLUCOSE BLDC GLUCOMTR-MCNC: 266 MG/DL — HIGH (ref 70–99)
GLUCOSE BLDC GLUCOMTR-MCNC: 269 MG/DL — HIGH (ref 70–99)
GLUCOSE SERPL-MCNC: 206 MG/DL — HIGH (ref 70–99)
HCT VFR BLD CALC: 27.1 % — LOW (ref 39–50)
HGB BLD-MCNC: 8.5 G/DL — LOW (ref 13–17)
INR BLD: 1.46 — HIGH (ref 0.88–1.16)
LYMPHOCYTES # BLD AUTO: 2.4 % — LOW (ref 13–44)
MAGNESIUM SERPL-MCNC: 1.9 MG/DL — SIGNIFICANT CHANGE UP (ref 1.6–2.6)
MCHC RBC-ENTMCNC: 29.1 PG — SIGNIFICANT CHANGE UP (ref 27–34)
MCHC RBC-ENTMCNC: 31.4 G/DL — LOW (ref 32–36)
MCV RBC AUTO: 92.8 FL — SIGNIFICANT CHANGE UP (ref 80–100)
MONOCYTES NFR BLD AUTO: 2.6 % — SIGNIFICANT CHANGE UP (ref 2–14)
NEUTROPHILS NFR BLD AUTO: 94.9 % — HIGH (ref 43–77)
PHOSPHATE SERPL-MCNC: 3.8 MG/DL — SIGNIFICANT CHANGE UP (ref 2.5–4.5)
PLATELET # BLD AUTO: 94 K/UL — LOW (ref 150–400)
POTASSIUM SERPL-MCNC: 3.8 MMOL/L — SIGNIFICANT CHANGE UP (ref 3.5–5.3)
POTASSIUM SERPL-SCNC: 3.8 MMOL/L — SIGNIFICANT CHANGE UP (ref 3.5–5.3)
PROTHROM AB SERPL-ACNC: 16.3 SEC — HIGH (ref 9.8–12.7)
RBC # BLD: 2.92 M/UL — LOW (ref 4.2–5.8)
RBC # FLD: 16.9 % — SIGNIFICANT CHANGE UP (ref 10.3–16.9)
SODIUM SERPL-SCNC: 137 MMOL/L — SIGNIFICANT CHANGE UP (ref 135–145)
SPECIMEN SOURCE: SIGNIFICANT CHANGE UP
WBC # BLD: 21 K/UL — HIGH (ref 3.8–10.5)
WBC # FLD AUTO: 21 K/UL — HIGH (ref 3.8–10.5)

## 2018-03-18 PROCEDURE — 78806: CPT | Mod: 26

## 2018-03-18 PROCEDURE — 71045 X-RAY EXAM CHEST 1 VIEW: CPT | Mod: 26

## 2018-03-18 PROCEDURE — 99233 SBSQ HOSP IP/OBS HIGH 50: CPT | Mod: GC

## 2018-03-18 RX ORDER — ARGATROBAN 50 MG/50ML
0.38 INJECTION, SOLUTION INTRAVENOUS
Qty: 50 | Refills: 0 | Status: DISCONTINUED | OUTPATIENT
Start: 2018-03-18 | End: 2018-03-19

## 2018-03-18 RX ORDER — INSULIN GLARGINE 100 [IU]/ML
20 INJECTION, SOLUTION SUBCUTANEOUS AT BEDTIME
Qty: 0 | Refills: 0 | Status: DISCONTINUED | OUTPATIENT
Start: 2018-03-18 | End: 2018-03-19

## 2018-03-18 RX ORDER — ISOSORBIDE DINITRATE 5 MG/1
5 TABLET ORAL THREE TIMES A DAY
Qty: 0 | Refills: 0 | Status: DISCONTINUED | OUTPATIENT
Start: 2018-03-18 | End: 2018-03-22

## 2018-03-18 RX ORDER — HYDROCORTISONE 20 MG
50 TABLET ORAL EVERY 8 HOURS
Qty: 0 | Refills: 0 | Status: DISCONTINUED | OUTPATIENT
Start: 2018-03-18 | End: 2018-03-19

## 2018-03-18 RX ORDER — HYDRALAZINE HCL 50 MG
5 TABLET ORAL EVERY 6 HOURS
Qty: 0 | Refills: 0 | Status: DISCONTINUED | OUTPATIENT
Start: 2018-03-18 | End: 2018-03-22

## 2018-03-18 RX ORDER — ARGATROBAN 50 MG/50ML
0.5 INJECTION, SOLUTION INTRAVENOUS
Qty: 50 | Refills: 0 | Status: DISCONTINUED | OUTPATIENT
Start: 2018-03-18 | End: 2018-03-18

## 2018-03-18 RX ADMIN — Medication 6: at 17:45

## 2018-03-18 RX ADMIN — Medication 5 MILLIGRAM(S): at 11:57

## 2018-03-18 RX ADMIN — ISOSORBIDE DINITRATE 5 MILLIGRAM(S): 5 TABLET ORAL at 14:00

## 2018-03-18 RX ADMIN — PIPERACILLIN AND TAZOBACTAM 200 GRAM(S): 4; .5 INJECTION, POWDER, LYOPHILIZED, FOR SOLUTION INTRAVENOUS at 14:37

## 2018-03-18 RX ADMIN — INSULIN GLARGINE 20 UNIT(S): 100 INJECTION, SOLUTION SUBCUTANEOUS at 22:42

## 2018-03-18 RX ADMIN — ARGATROBAN 1.8 MICROGRAM(S)/KG/MIN: 50 INJECTION, SOLUTION INTRAVENOUS at 18:36

## 2018-03-18 RX ADMIN — Medication 5 MILLIGRAM(S): at 17:46

## 2018-03-18 RX ADMIN — Medication 1 TABLET(S): at 11:57

## 2018-03-18 RX ADMIN — Medication 50 MILLILITER(S): at 07:42

## 2018-03-18 RX ADMIN — Medication 50 MILLILITER(S): at 11:57

## 2018-03-18 RX ADMIN — ISOSORBIDE DINITRATE 5 MILLIGRAM(S): 5 TABLET ORAL at 22:41

## 2018-03-18 RX ADMIN — Medication 4: at 07:05

## 2018-03-18 RX ADMIN — PIPERACILLIN AND TAZOBACTAM 200 GRAM(S): 4; .5 INJECTION, POWDER, LYOPHILIZED, FOR SOLUTION INTRAVENOUS at 07:05

## 2018-03-18 RX ADMIN — Medication 50 MILLIGRAM(S): at 07:05

## 2018-03-18 RX ADMIN — Medication 5 MILLIGRAM(S): at 11:56

## 2018-03-18 RX ADMIN — Medication 50 MILLIGRAM(S): at 14:37

## 2018-03-18 RX ADMIN — PIPERACILLIN AND TAZOBACTAM 200 GRAM(S): 4; .5 INJECTION, POWDER, LYOPHILIZED, FOR SOLUTION INTRAVENOUS at 22:36

## 2018-03-18 RX ADMIN — Medication 100 MILLIGRAM(S): at 11:56

## 2018-03-18 RX ADMIN — Medication 50 MILLIGRAM(S): at 22:35

## 2018-03-18 RX ADMIN — Medication 9.6 MICROGRAM(S)/KG/MIN: at 07:51

## 2018-03-18 RX ADMIN — Medication 4: at 22:42

## 2018-03-18 RX ADMIN — Medication 6: at 11:57

## 2018-03-18 RX ADMIN — Medication 50 MILLILITER(S): at 00:22

## 2018-03-18 RX ADMIN — ARGATROBAN 2.4 MICROGRAM(S)/KG/MIN: 50 INJECTION, SOLUTION INTRAVENOUS at 10:41

## 2018-03-18 RX ADMIN — ARGATROBAN 1.8 MICROGRAM(S)/KG/MIN: 50 INJECTION, SOLUTION INTRAVENOUS at 22:36

## 2018-03-18 RX ADMIN — Medication 50 MILLILITER(S): at 19:23

## 2018-03-18 RX ADMIN — Medication 1 MILLIGRAM(S): at 11:56

## 2018-03-18 NOTE — PROGRESS NOTE ADULT - ASSESSMENT
ASSESSMENT: 63M PMH HFrEF 10-15% (ischemic), MI, s/p AICD vs PPM, possible Afib, HTN, DM2 on insulin, possible CKD, and gout, who presents with a chief complaint of generalized weakness now in septic shock likely 2/2 LE cellulitis and cardiogenic shock.    NEURO  #Toxic metabolic encephalopathy  - Continues to be lethargic likely 2/2 sepsis and uremia  - Place new HD cath as patient pulled HD cath overnight  - Continue with dialysis as necessary     CARDIOVASCULAR   # HYPOTENSION   - 2/2 shock, likely septic shock with component of cardiogenic shock in setting of HFrEF   - Pt persistently hypotensive this admission, improving and off pressors. Given low EF and low central venous O2 saturation, was given dobutamine. d/c Dobutamine   - F/u heart failure team recs  - Slowly downtrending WBC count, continue to monitor CBC  - Gallium scan ordered  - Lactate downtrended to WNL   - CHF with severely reduced EF 15%, caution with fluids. Per renal recs, can give IV albumin for fluids.  - Switch to intermittent HD given improved BP    # CHF exacerbation  - CHF with EF 10-15% per pt 2/2 ischemic cardiomyopathy s/p AICD as indicated by CXR   - Elevated BNP on admission with R sided effusion and edema  - Cardiac shock major component of hypotension   - Increased pulm congestion noted on chest Xray   - Starting On Hydralazine 5mg  q6h and Isordil 5mg tid    - f/u CHF recs   - Give fluids judiciously given low EF in setting of likely sepsis  - Unclear medical regimen opt. Per [facility] ( and [place]) pt has not picked up Torsemide 20 or Metoprolol 100 since November.   - Hold ACE/Metoprolol in setting of sepsis  - CVO@ saturation improved after transfusion    #AFIB  - Unclear if hx of Afib. Per pharmacy pt on coumadin. Pt currently in Sinus rhythm however had episodes of A fib per records.   -d/c  Hep drip given concern of HIT. Started on Argatroban for anticoagulation.   - Pt s/p FFP (3/13,3/14) and Vit K (3/13) for thoracocentesis and HD catheter placement.        #CAD- Hx of MI s/p AICD vs PPI- Per pharmacy pt has not picked up Plavix since November  - Obtain collateral from outpatient cardiologist     # LE Edema   - LE edema with chronic venous stasis.   - Duplex does not show DVT    PULMONARY   #Acute resp failure   - pt with R loculated plural effusion noted to have increased work of breathing. Likely 2/2 fluid overload, FLuid studies consistent with exudative effusion.   - s/p thoracentesis on 3/13 with R chest tube placement. Chest tube removed 3/15.   - On 4L NC saturating well.   - Tapering down Solucortef to 50mf q8h. (Was on 50 q6h 3/12-3/17). recent hx of Steroid administration 2 months ago  - Plan to keep Hb 9-10. s/p  pRBC transfusion on 3/12. 3/16  - Monitor resp status  - Appreciate CHF team recs regarding fluid status. Continue dialysis for fluid removal    # Possible Pneumothorax due to trapped lung   - Not seen on recent Xrays. Possibly skin fold   - Pt HD stable. Will monitor     #RENAL   #GILMER- 2/2 ischemic ATN  -Unknown baseline Cr presenting with Cr 3.93 with metabolic derangements.   - Likely 2/2 Sepsis, low intravascular volume and CHF  - Trend BUN and Cr.  - Renal team on board, HD catheter placed on 3/13 and replaced on 3/18. c/w  intermittent HD. renal team planning HD for tomorrow   - CT abdomen and pelvis without acute obstruction.   - retroperitoneal ultrasound showing medical renal disease.     #Hyperkalemia   - PT with elevated K to 5.7 on admission,  no EKG changes   - Continue telemetry monitoring  - Trend K   - Can given Kayexalate if persists   - c/w dialysis     #Metabolic acidosis   - 2/2 Lactate, starvation ketoacidosis and uremia   - Improved    #ID   - septic shock likely due to L LE cellulitis however continues to have persistently elevated WBCs not fully explained by steroids. Given worsening status, will obtain Gallium scan to look for other status.   - TTE with no vegetations. Pt not stable for RUKHSANA   - Pending Gallium scan results, will de-escalate Vanco and Zosyn. 3/18 Day 9   - repeat LE CT does not show abscess or gas.   - G- R lung Effusion likely from overload. s/p thoracentesis with improvement in resp status   -  HIV negative  - Vascular surgery on board. Recommending Amputation as an option if pt continues to be unstable.       #GI   - pt wit Elevated Bilirubin and Alk phos. Abd exam benign  - CT abdomen/pelvis consistent with cholelithiasis and ascites.     #HEME    #Thrombocytopenia  - Intermediate probability of HIT, will dc Hep gtt and  switch to argobatran   - COuld also be related to sepsis and Vanc;/zosyn  - Monitor platelets      # elevated INR. Unclear etiology. Pt on coumadin?   - Switched to Argobatran given concern for HIT  - Given Vit K and FFP3/12. FFP 3/13   - Monitor coags    #Anemia  - Stable at 8-9. Anemia of chronic disease.  -s/p 1pRBC on 3/12, 3/16   - WIll keep Hb around 9-10     #ENDOCRINE   - Elevated Glucose. Anion gap elevated however likely 2/2 Uremia and Lactate. Ketones in urine likely 2/2 Starvation.   - S/p insulin drip  - Currently on MISS.   - Monitor blood glucose   - A1C 8.6%  - Increased Lantus to 14U yesterday. Monitor blood glucose and ISS coverage.     F: No IVF   E: Replete K<4 Mg<2, caution in setting of acute renal failure  N: Renal Diet     Lines:  R HD catheter (3/17). L IJ (3/12). R A line (3/12), barnes      Full code   Dispo: MICU  Ppx: Argatroban

## 2018-03-18 NOTE — PROGRESS NOTE ADULT - SUBJECTIVE AND OBJECTIVE BOX
SUBJECTIVE: Patient seen and examined bedside, no pain, no active complains, no fever.    argatroban Infusion 0.5 MICROgram(s)/kG/Min IV Continuous <Continuous>  DOBUTamine Infusion 4 MICROgram(s)/kG/Min IV Continuous <Continuous>  hydrALAZINE Injectable 5 milliGRAM(s) IV Push every 6 hours  isosorbide   dinitrate Tablet (ISORDIL) 5 milliGRAM(s) Oral three times a day  piperacillin/tazobactam IVPB. 2.25 Gram(s) IV Intermittent every 8 hours      Vital Signs Last 24 Hrs  T(C): 36.4 (18 Mar 2018 10:00), Max: 36.6 (17 Mar 2018 17:00)  T(F): 97.5 (18 Mar 2018 10:00), Max: 97.8 (17 Mar 2018 17:00)  HR: 90 (18 Mar 2018 14:00) (82 - 94)  BP: --  BP(mean): --  RR: 25 (18 Mar 2018 14:00) (17 - 26)  SpO2: 98% (18 Mar 2018 14:00) (95% - 100%)  I&O's Detail    17 Mar 2018 07:01  -  18 Mar 2018 07:00  --------------------------------------------------------  IN:    Albumin 5%  - 250 mL: 100 mL    DOBUTamine Infusion: 192 mL    heparin Infusion: 78 mL    heparin Infusion: 23 mL    IV PiggyBack: 100 mL    Solution: 300 mL  Total IN: 793 mL    OUT:    Other: 1500 mL  Total OUT: 1500 mL    Total NET: -707 mL      18 Mar 2018 07:01  -  18 Mar 2018 15:00  --------------------------------------------------------  IN:    Albumin 5%  - 250 mL: 50 mL    argatroban Infusion: 7.2 mL    DOBUTamine Infusion: 43.2 mL    heparin Infusion: 18 mL  Total IN: 118.4 mL    OUT:  Total OUT: 0 mL    Total NET: 118.4 mL          General: NAD, resting comfortably in bed  C/V: NSR  Pulm: Nonlabored breathing, no respiratory distress  Abd: soft, NT/ND.  Extrem:   Bilateral cellulitis - improving from the day of admission, erythematous improved. swelling reducing, no discharges/pus/ulcers.         LABS:                        8.5    21.0  )-----------( 94       ( 18 Mar 2018 07:04 )             27.1     03-18    137  |  98  |  43<H>  ----------------------------<  206<H>  3.8   |  25  |  2.62<H>    Ca    8.6      18 Mar 2018 07:04  Phos  3.8     03-18  Mg     1.9     03-18      PT/INR - ( 18 Mar 2018 07:04 )   PT: 16.3 sec;   INR: 1.46          PTT - ( 18 Mar 2018 07:04 )  PTT:62.1 sec      RADIOLOGY & ADDITIONAL STUDIES:

## 2018-03-18 NOTE — PROGRESS NOTE ADULT - SUBJECTIVE AND OBJECTIVE BOX
Patient seen and examined at bedside.  Patient is a 63 year old male with CKD, systolic heart failure with EF 10-15%, s/p AICD, hypertension, diabetes mellitus and gout for whom nephrology was consulted for GILMER. Patient is comfortable and in no acute distress. Patient was last dialyzed 3/17 with UF of 1.5L     acetaminophen   Tablet. 650 milliGRAM(s) every 6 hours PRN  albumin human 25% IVPB 50 milliLiter(s) every 1 hour  albumin human 25% IVPB 50 milliLiter(s) every 6 hours  argatroban Infusion 0.5 MICROgram(s)/kG/Min <Continuous>  bisacodyl 5 milliGRAM(s) daily  dextrose 5%. 1000 milliLiter(s) <Continuous>  dextrose 50% Injectable 12.5 Gram(s) once  dextrose 50% Injectable 25 Gram(s) once  dextrose 50% Injectable 25 Gram(s) once  dextrose Gel 1 Dose(s) once PRN  DOBUTamine Infusion 4 MICROgram(s)/kG/Min <Continuous>  folic acid 1 milliGRAM(s) daily  glucagon  Injectable 1 milliGRAM(s) once PRN  hydrALAZINE Injectable 5 milliGRAM(s) every 6 hours  hydrocortisone sodium succinate Injectable 50 milliGRAM(s) every 8 hours  insulin glargine Injectable (LANTUS) 14 Unit(s) at bedtime  insulin lispro (HumaLOG) corrective regimen sliding scale   Before meals and at bedtime  isosorbide   dinitrate Tablet (ISORDIL) 5 milliGRAM(s) three times a day  multivitamin 1 Tablet(s) daily  piperacillin/tazobactam IVPB. 2.25 Gram(s) every 8 hours  thiamine 100 milliGRAM(s) daily    Allergies    No Known Allergies    Intolerances    T(C): , Max: 36.6 (03-17-18 @ 17:00)  T(F): , Max: 97.8 (03-17-18 @ 17:00)  HR: 92 (03-18-18 @ 12:00)  BP: 106/62  RR: 18 (03-18-18 @ 12:00)  SpO2: 97% (03-18-18 @ 12:00)    03-17 @ 07:01  -  03-18 @ 07:00  --------------------------------------------------------  IN:    Albumin 5%  - 250 mL: 100 mL    DOBUTamine Infusion: 192 mL    heparin Infusion: 78 mL    heparin Infusion: 23 mL    IV PiggyBack: 100 mL    Solution: 300 mL  Total IN: 793 mL    OUT:    Other: 1500 mL  Total OUT: 1500 mL    Total NET: -707 mL    03-18 @ 07:01  -  03-18 @ 13:19  --------------------------------------------------------  IN:    Albumin 5%  - 250 mL: 50 mL    argatroban Infusion: 7.2 mL    DOBUTamine Infusion: 43.2 mL    heparin Infusion: 18 mL  Total IN: 118.4 mL    OUT:  Total OUT: 0 mL    Total NET: 118.4 mL    LABS:                        8.5    21.0  )-----------( 94       ( 18 Mar 2018 07:04 )             27.1     03-18    137  |  98  |  43<H>  ----------------------------<  206<H>  3.8   |  25  |  2.62<H>    Ca    8.6      18 Mar 2018 07:04  Phos  3.8     03-18  Mg     1.9     03-18    PT/INR - ( 18 Mar 2018 07:04 )   PT: 16.3 sec;   INR: 1.46       PTT - ( 18 Mar 2018 07:04 )  PTT:62.1 sec

## 2018-03-18 NOTE — PROGRESS NOTE ADULT - PROBLEM SELECTOR PLAN 1
Patient is a 63 year old male with oligoanuric GILMER likely from ischemic ATN from shock vs post infectious GN from leg wound. However in post infectious GN you would expect both C3 and C4 to be low which is not the case here. Patient's renal failure is likely due to ischemic ATN. Urine output not being monitored, no incontinent episodes documented. Patient was dialyzed for UF on 3/17    P - Patient does not require emergent dialysis   Will plan for dialysis tomorrow for UF   Please check Serum immunofexation, SPEP, and JAY   Please monitor I/Os   Avoid nephrotoxic medications, IV contrast and NSAIDs

## 2018-03-18 NOTE — PROGRESS NOTE ADULT - SUBJECTIVE AND OBJECTIVE BOX
INTERVAL HPI/OVERNIGHT EVENTS: Replaced HD cath. Mixed venous 57%, increased Dobutamine to 4. Repeat VBG 80%. Restraints ordered for agitation.     SUBJECTIVE: Patient seen and examined at bedside. Pt Ox3 however drowsy and falls asleep during conversation.       OBJECTIVE:    VITAL SIGNS:  ICU Vital Signs Last 24 Hrs  T(C): 36.4 (18 Mar 2018 10:00), Max: 36.6 (17 Mar 2018 17:00)  T(F): 97.5 (18 Mar 2018 10:00), Max: 97.8 (17 Mar 2018 17:00)  HR: 92 (18 Mar 2018 12:00) (82 - 94)  BP: --  BP(mean): --  ABP: 106/62 (18 Mar 2018 12:00) (102/58 - 146/94)  ABP(mean): 76 (18 Mar 2018 12:00) (72 - 108)  RR: 18 (18 Mar 2018 12:00) (17 - 25)  SpO2: 97% (18 Mar 2018 12:00) (95% - 100%)        03-17 @ 07:01 - 03-18 @ 07:00  --------------------------------------------------------  IN: 793 mL / OUT: 1500 mL / NET: -707 mL    03-18 @ 07:01  - 03-18 @ 12:50  --------------------------------------------------------  IN: 118.4 mL / OUT: 0 mL / NET: 118.4 mL      CAPILLARY BLOOD GLUCOSE      POCT Blood Glucose.: 266 mg/dL (18 Mar 2018 10:53)      PHYSICAL EXAM:    General: WDWN M in NAD, somnolent on exam, arousable, but unable to answer questions due to fatigue  HEENT: NC/AT, PERRL  Neck: supple  Respiratory: Faint basilar crackles, poor inspiratory effort. No wheeze or rhonchi appreciated  Cardiac: Regular rate, +S1/S2, no murmurs, rubs, or gallops  Gastrointestinal: Mild TTP, diffuse throughout abdomen, no rebound or guarding. +BS  Genitourinary: Testicular edema   Extremities: Lukewarm, b/l areas of venous stasis. RLE with dry dressing  Neuro: Patient tired, unable to assess orientation, intermittently follows commands, not conversational. Does move extremities x4 to mild noxious stimulus  Skin: no rash  MSK: no joint swelling  Vasc: 1+ DP pulses    MEDICATIONS:  MEDICATIONS  (STANDING):  albumin human 25% IVPB 50 milliLiter(s) IV Intermittent every 1 hour  albumin human 25% IVPB 50 milliLiter(s) IV Intermittent every 6 hours  argatroban Infusion 0.5 MICROgram(s)/kG/Min (2.4 mL/Hr) IV Continuous <Continuous>  bisacodyl 5 milliGRAM(s) Oral daily  dextrose 5%. 1000 milliLiter(s) (50 mL/Hr) IV Continuous <Continuous>  dextrose 50% Injectable 12.5 Gram(s) IV Push once  dextrose 50% Injectable 25 Gram(s) IV Push once  dextrose 50% Injectable 25 Gram(s) IV Push once  DOBUTamine Infusion 4 MICROgram(s)/kG/Min (9.6 mL/Hr) IV Continuous <Continuous>  folic acid 1 milliGRAM(s) Oral daily  hydrALAZINE Injectable 5 milliGRAM(s) IV Push every 6 hours  hydrocortisone sodium succinate Injectable 50 milliGRAM(s) IV Push every 8 hours  insulin glargine Injectable (LANTUS) 14 Unit(s) SubCutaneous at bedtime  insulin lispro (HumaLOG) corrective regimen sliding scale   SubCutaneous Before meals and at bedtime  isosorbide   dinitrate Tablet (ISORDIL) 5 milliGRAM(s) Oral three times a day  multivitamin 1 Tablet(s) Oral daily  piperacillin/tazobactam IVPB. 2.25 Gram(s) IV Intermittent every 8 hours  thiamine 100 milliGRAM(s) Oral daily    MEDICATIONS  (PRN):  acetaminophen   Tablet. 650 milliGRAM(s) Oral every 6 hours PRN Moderate Pain (4 - 6)  dextrose Gel 1 Dose(s) Oral once PRN Blood Glucose LESS THAN 70 milliGRAM(s)/deciliter  glucagon  Injectable 1 milliGRAM(s) IntraMuscular once PRN Glucose LESS THAN 70 milligrams/deciliter      ALLERGIES:  Allergies    No Known Allergies    Intolerances        LABS:                        8.5    21.0  )-----------( 94       ( 18 Mar 2018 07:04 )             27.1     03-18    137  |  98  |  43<H>  ----------------------------<  206<H>  3.8   |  25  |  2.62<H>    Ca    8.6      18 Mar 2018 07:04  Phos  3.8     03-18  Mg     1.9     03-18      PT/INR - ( 18 Mar 2018 07:04 )   PT: 16.3 sec;   INR: 1.46          PTT - ( 18 Mar 2018 07:04 )  PTT:62.1 sec      RADIOLOGY & ADDITIONAL TESTS: Reviewed. INTERVAL HPI/OVERNIGHT EVENTS: Replaced HD cath. Mixed venous 57%, increased Dobutamine to 4. Repeat VBG 80%. Restraints ordered for agitation.     SUBJECTIVE: Patient seen and examined at bedside. Pt Ox3 however drowsy and falls asleep during conversation.       OBJECTIVE:    VITAL SIGNS:  ICU Vital Signs Last 24 Hrs  T(C): 36.4 (18 Mar 2018 10:00), Max: 36.6 (17 Mar 2018 17:00)  T(F): 97.5 (18 Mar 2018 10:00), Max: 97.8 (17 Mar 2018 17:00)  HR: 92 (18 Mar 2018 12:00) (82 - 94)  BP: --  BP(mean): --  ABP: 106/62 (18 Mar 2018 12:00) (102/58 - 146/94)  ABP(mean): 76 (18 Mar 2018 12:00) (72 - 108)  RR: 18 (18 Mar 2018 12:00) (17 - 25)  SpO2: 97% (18 Mar 2018 12:00) (95% - 100%)        03-17 @ 07:01 - 03-18 @ 07:00  --------------------------------------------------------  IN: 793 mL / OUT: 1500 mL / NET: -707 mL    03-18 @ 07:01  - 03-18 @ 12:50  --------------------------------------------------------  IN: 118.4 mL / OUT: 0 mL / NET: 118.4 mL      CAPILLARY BLOOD GLUCOSE      POCT Blood Glucose.: 266 mg/dL (18 Mar 2018 10:53)      PHYSICAL EXAM:    General: Pt Ox3 however drowsy and falls asleep during conversation.     HEENT: NC/AT, PERRL  Neck: supple R IJ in place.   Respiratory: Decreased breath sounds b/l   Cardiac: Tachycardic. +S1/S2, no murmurs, rubs, or gallops  Gastrointestinal: Soft nd/nt, no rebound or guarding. +BS  Genitourinary: Testicular edema   Extremities: warm to touch.  b/l areas of venous stasis. RLE with dry dressing  Skin: no rash  MSK: no joint swelling  Vasc: 1+ DP pulses    MEDICATIONS:  MEDICATIONS  (STANDING):  albumin human 25% IVPB 50 milliLiter(s) IV Intermittent every 1 hour  albumin human 25% IVPB 50 milliLiter(s) IV Intermittent every 6 hours  argatroban Infusion 0.5 MICROgram(s)/kG/Min (2.4 mL/Hr) IV Continuous <Continuous>  bisacodyl 5 milliGRAM(s) Oral daily  dextrose 5%. 1000 milliLiter(s) (50 mL/Hr) IV Continuous <Continuous>  dextrose 50% Injectable 12.5 Gram(s) IV Push once  dextrose 50% Injectable 25 Gram(s) IV Push once  dextrose 50% Injectable 25 Gram(s) IV Push once  DOBUTamine Infusion 4 MICROgram(s)/kG/Min (9.6 mL/Hr) IV Continuous <Continuous>  folic acid 1 milliGRAM(s) Oral daily  hydrALAZINE Injectable 5 milliGRAM(s) IV Push every 6 hours  hydrocortisone sodium succinate Injectable 50 milliGRAM(s) IV Push every 8 hours  insulin glargine Injectable (LANTUS) 14 Unit(s) SubCutaneous at bedtime  insulin lispro (HumaLOG) corrective regimen sliding scale   SubCutaneous Before meals and at bedtime  isosorbide   dinitrate Tablet (ISORDIL) 5 milliGRAM(s) Oral three times a day  multivitamin 1 Tablet(s) Oral daily  piperacillin/tazobactam IVPB. 2.25 Gram(s) IV Intermittent every 8 hours  thiamine 100 milliGRAM(s) Oral daily    MEDICATIONS  (PRN):  acetaminophen   Tablet. 650 milliGRAM(s) Oral every 6 hours PRN Moderate Pain (4 - 6)  dextrose Gel 1 Dose(s) Oral once PRN Blood Glucose LESS THAN 70 milliGRAM(s)/deciliter  glucagon  Injectable 1 milliGRAM(s) IntraMuscular once PRN Glucose LESS THAN 70 milligrams/deciliter      ALLERGIES:  Allergies    No Known Allergies    Intolerances        LABS:                        8.5    21.0  )-----------( 94       ( 18 Mar 2018 07:04 )             27.1     03-18    137  |  98  |  43<H>  ----------------------------<  206<H>  3.8   |  25  |  2.62<H>    Ca    8.6      18 Mar 2018 07:04  Phos  3.8     03-18  Mg     1.9     03-18      PT/INR - ( 18 Mar 2018 07:04 )   PT: 16.3 sec;   INR: 1.46          PTT - ( 18 Mar 2018 07:04 )  PTT:62.1 sec      RADIOLOGY & ADDITIONAL TESTS: Reviewed.

## 2018-03-18 NOTE — PROGRESS NOTE ADULT - ATTENDING COMMENTS
Case reviewed and discussed with Dr. Davis.  Labs are acceptable and patient oxygenating well on NC O2.  Agree with note above.   Next HD likely tomorrow.  Please feel free to call us with any questions/concerns.

## 2018-03-19 DIAGNOSIS — Z78.9 OTHER SPECIFIED HEALTH STATUS: ICD-10-CM

## 2018-03-19 DIAGNOSIS — Z51.5 ENCOUNTER FOR PALLIATIVE CARE: ICD-10-CM

## 2018-03-19 DIAGNOSIS — R53.81 OTHER MALAISE: ICD-10-CM

## 2018-03-19 DIAGNOSIS — I42.8 OTHER CARDIOMYOPATHIES: ICD-10-CM

## 2018-03-19 DIAGNOSIS — R60.1 GENERALIZED EDEMA: ICD-10-CM

## 2018-03-19 LAB
% ALBUMIN: 43.1 % — SIGNIFICANT CHANGE UP
% ALPHA 1: 5.9 % — SIGNIFICANT CHANGE UP
% ALPHA 2: 6.7 % — SIGNIFICANT CHANGE UP
% BETA: 12.1 % — SIGNIFICANT CHANGE UP
% GAMMA: 32.2 % — SIGNIFICANT CHANGE UP
% M SPIKE: 4.5 % — SIGNIFICANT CHANGE UP
ALBUMIN SERPL ELPH-MCNC: 2.5 G/DL — LOW (ref 3.6–5.5)
ALBUMIN SERPL ELPH-MCNC: 3.7 G/DL — SIGNIFICANT CHANGE UP (ref 3.3–5)
ALBUMIN/GLOB SERPL ELPH: 0.8 RATIO — SIGNIFICANT CHANGE UP
ALP SERPL-CCNC: 66 U/L — SIGNIFICANT CHANGE UP (ref 40–120)
ALPHA1 GLOB SERPL ELPH-MCNC: 0.3 G/DL — SIGNIFICANT CHANGE UP (ref 0.1–0.4)
ALPHA2 GLOB SERPL ELPH-MCNC: 0.4 G/DL — LOW (ref 0.5–1)
ALT FLD-CCNC: 15 U/L — SIGNIFICANT CHANGE UP (ref 10–45)
ANION GAP SERPL CALC-SCNC: 16 MMOL/L — SIGNIFICANT CHANGE UP (ref 5–17)
ANISOCYTOSIS BLD QL: SLIGHT — SIGNIFICANT CHANGE UP
APTT BLD: 104 SEC — HIGH (ref 27.5–37.4)
APTT BLD: 106.1 SEC — HIGH (ref 27.5–37.4)
APTT BLD: 81.6 SEC — HIGH (ref 27.5–37.4)
APTT BLD: 99.1 SEC — HIGH (ref 27.5–37.4)
APTT BLD: 99.3 SEC — HIGH (ref 27.5–37.4)
AST SERPL-CCNC: 15 U/L — SIGNIFICANT CHANGE UP (ref 10–40)
B-GLOBULIN SERPL ELPH-MCNC: 0.7 G/DL — SIGNIFICANT CHANGE UP (ref 0.5–1)
BILIRUB DIRECT SERPL-MCNC: 1.2 MG/DL — HIGH (ref 0–0.2)
BILIRUB INDIRECT FLD-MCNC: 1.8 MG/DL — HIGH (ref 0.2–1)
BILIRUB SERPL-MCNC: 3 MG/DL — HIGH (ref 0.2–1.2)
BUN SERPL-MCNC: 54 MG/DL — HIGH (ref 7–23)
CALCIUM SERPL-MCNC: 8.7 MG/DL — SIGNIFICANT CHANGE UP (ref 8.4–10.5)
CHLORIDE SERPL-SCNC: 100 MMOL/L — SIGNIFICANT CHANGE UP (ref 96–108)
CO2 SERPL-SCNC: 21 MMOL/L — LOW (ref 22–31)
CREAT SERPL-MCNC: 3.24 MG/DL — HIGH (ref 0.5–1.3)
GAMMA GLOBULIN: 1.9 G/DL — HIGH (ref 0.6–1.6)
GIANT PLATELETS BLD QL SMEAR: PRESENT — SIGNIFICANT CHANGE UP
GLUCOSE BLDC GLUCOMTR-MCNC: 178 MG/DL — HIGH (ref 70–99)
GLUCOSE BLDC GLUCOMTR-MCNC: 193 MG/DL — HIGH (ref 70–99)
GLUCOSE BLDC GLUCOMTR-MCNC: 195 MG/DL — HIGH (ref 70–99)
GLUCOSE BLDC GLUCOMTR-MCNC: 195 MG/DL — HIGH (ref 70–99)
GLUCOSE SERPL-MCNC: 215 MG/DL — HIGH (ref 70–99)
HCT VFR BLD CALC: 27.7 % — LOW (ref 39–50)
HGB BLD-MCNC: 8.7 G/DL — LOW (ref 13–17)
HYPOCHROMIA BLD QL: SLIGHT — SIGNIFICANT CHANGE UP
INR BLD: 2.47 — HIGH (ref 0.88–1.16)
INR BLD: 3.16 — HIGH (ref 0.88–1.16)
INTERPRETATION SERPL IFE-IMP: SIGNIFICANT CHANGE UP
LG PLATELETS BLD QL AUTO: PRESENT — SIGNIFICANT CHANGE UP
LYMPHOCYTES # BLD AUTO: 2 % — LOW (ref 13–44)
M-SPIKE: 0.3 G/DL — HIGH (ref 0–0)
MACROCYTES BLD QL: SLIGHT — SIGNIFICANT CHANGE UP
MAGNESIUM SERPL-MCNC: 1.9 MG/DL — SIGNIFICANT CHANGE UP (ref 1.6–2.6)
MANUAL SMEAR VERIFICATION: SIGNIFICANT CHANGE UP
MCHC RBC-ENTMCNC: 29.2 PG — SIGNIFICANT CHANGE UP (ref 27–34)
MCHC RBC-ENTMCNC: 31.4 G/DL — LOW (ref 32–36)
MCV RBC AUTO: 93 FL — SIGNIFICANT CHANGE UP (ref 80–100)
MICROCYTES BLD QL: SLIGHT — SIGNIFICANT CHANGE UP
MONOCYTES NFR BLD AUTO: 2 % — SIGNIFICANT CHANGE UP (ref 2–14)
MYELOCYTES NFR BLD: 1 % — HIGH
NEUTROPHILS NFR BLD AUTO: 93 % — HIGH (ref 43–77)
NEUTS BAND # BLD: 2 % — SIGNIFICANT CHANGE UP
OVALOCYTES BLD QL SMEAR: SLIGHT — SIGNIFICANT CHANGE UP
PF4 HEPARIN CMPLX AB SER-ACNC: SIGNIFICANT CHANGE UP
PF4 HEPARIN CMPLX AB SERPL QL IA: POSITIVE
PHOSPHATE SERPL-MCNC: 4 MG/DL — SIGNIFICANT CHANGE UP (ref 2.5–4.5)
PLAT MORPH BLD: (no result)
PLATELET # BLD AUTO: 77 K/UL — LOW (ref 150–400)
POTASSIUM SERPL-MCNC: 3.6 MMOL/L — SIGNIFICANT CHANGE UP (ref 3.5–5.3)
POTASSIUM SERPL-SCNC: 3.6 MMOL/L — SIGNIFICANT CHANGE UP (ref 3.5–5.3)
PROT PATTERN SERPL ELPH-IMP: SIGNIFICANT CHANGE UP
PROT SERPL-MCNC: 6.1 G/DL — SIGNIFICANT CHANGE UP (ref 6–8.3)
PROT SERPL-MCNC: 6.1 G/DL — SIGNIFICANT CHANGE UP (ref 6–8.3)
PROT SERPL-MCNC: 6.6 G/DL — SIGNIFICANT CHANGE UP (ref 6–8.3)
PROTHROM AB SERPL-ACNC: 27.9 SEC — HIGH (ref 9.8–12.7)
PROTHROM AB SERPL-ACNC: 35.9 SEC — HIGH (ref 9.8–12.7)
RBC # BLD: 2.98 M/UL — LOW (ref 4.2–5.8)
RBC # FLD: 17.2 % — HIGH (ref 10.3–16.9)
RBC BLD AUTO: (no result)
SODIUM SERPL-SCNC: 137 MMOL/L — SIGNIFICANT CHANGE UP (ref 135–145)
WBC # BLD: 18.7 K/UL — HIGH (ref 3.8–10.5)
WBC # FLD AUTO: 18.7 K/UL — HIGH (ref 3.8–10.5)

## 2018-03-19 PROCEDURE — 99231 SBSQ HOSP IP/OBS SF/LOW 25: CPT

## 2018-03-19 PROCEDURE — 99233 SBSQ HOSP IP/OBS HIGH 50: CPT | Mod: GC

## 2018-03-19 PROCEDURE — 99233 SBSQ HOSP IP/OBS HIGH 50: CPT

## 2018-03-19 PROCEDURE — 71045 X-RAY EXAM CHEST 1 VIEW: CPT | Mod: 26

## 2018-03-19 PROCEDURE — 99223 1ST HOSP IP/OBS HIGH 75: CPT

## 2018-03-19 RX ORDER — ARGATROBAN 50 MG/50ML
0.09 INJECTION, SOLUTION INTRAVENOUS
Qty: 50 | Refills: 0 | Status: DISCONTINUED | OUTPATIENT
Start: 2018-03-19 | End: 2018-03-19

## 2018-03-19 RX ORDER — HYDROCORTISONE 20 MG
50 TABLET ORAL EVERY 12 HOURS
Qty: 0 | Refills: 0 | Status: DISCONTINUED | OUTPATIENT
Start: 2018-03-19 | End: 2018-03-21

## 2018-03-19 RX ORDER — POTASSIUM CHLORIDE 20 MEQ
40 PACKET (EA) ORAL ONCE
Qty: 0 | Refills: 0 | Status: DISCONTINUED | OUTPATIENT
Start: 2018-03-19 | End: 2018-03-19

## 2018-03-19 RX ORDER — ALBUMIN HUMAN 25 %
50 VIAL (ML) INTRAVENOUS
Qty: 0 | Refills: 0 | Status: COMPLETED | OUTPATIENT
Start: 2018-03-19 | End: 2018-03-19

## 2018-03-19 RX ORDER — ARGATROBAN 50 MG/50ML
0.06 INJECTION, SOLUTION INTRAVENOUS
Qty: 50 | Refills: 0 | Status: DISCONTINUED | OUTPATIENT
Start: 2018-03-19 | End: 2018-03-19

## 2018-03-19 RX ORDER — MAGNESIUM SULFATE 500 MG/ML
1 VIAL (ML) INJECTION ONCE
Qty: 0 | Refills: 0 | Status: DISCONTINUED | OUTPATIENT
Start: 2018-03-19 | End: 2018-03-19

## 2018-03-19 RX ORDER — ARGATROBAN 50 MG/50ML
0.19 INJECTION, SOLUTION INTRAVENOUS
Qty: 50 | Refills: 0 | Status: DISCONTINUED | OUTPATIENT
Start: 2018-03-19 | End: 2018-03-19

## 2018-03-19 RX ORDER — WARFARIN SODIUM 2.5 MG/1
2.5 TABLET ORAL ONCE
Qty: 0 | Refills: 0 | Status: COMPLETED | OUTPATIENT
Start: 2018-03-19 | End: 2018-03-19

## 2018-03-19 RX ORDER — ESCITALOPRAM OXALATE 10 MG/1
5 TABLET, FILM COATED ORAL DAILY
Qty: 0 | Refills: 0 | Status: DISCONTINUED | OUTPATIENT
Start: 2018-03-19 | End: 2018-06-26

## 2018-03-19 RX ORDER — INSULIN GLARGINE 100 [IU]/ML
22 INJECTION, SOLUTION SUBCUTANEOUS AT BEDTIME
Qty: 0 | Refills: 0 | Status: DISCONTINUED | OUTPATIENT
Start: 2018-03-19 | End: 2018-03-21

## 2018-03-19 RX ADMIN — Medication 50 MILLILITER(S): at 19:02

## 2018-03-19 RX ADMIN — Medication 1 MILLIGRAM(S): at 12:47

## 2018-03-19 RX ADMIN — Medication 5 MILLIGRAM(S): at 19:00

## 2018-03-19 RX ADMIN — Medication 50 MILLIGRAM(S): at 19:00

## 2018-03-19 RX ADMIN — PIPERACILLIN AND TAZOBACTAM 200 GRAM(S): 4; .5 INJECTION, POWDER, LYOPHILIZED, FOR SOLUTION INTRAVENOUS at 21:57

## 2018-03-19 RX ADMIN — Medication 5 MILLIGRAM(S): at 00:00

## 2018-03-19 RX ADMIN — Medication 50 MILLILITER(S): at 16:23

## 2018-03-19 RX ADMIN — Medication 50 MILLILITER(S): at 12:46

## 2018-03-19 RX ADMIN — Medication 2: at 06:24

## 2018-03-19 RX ADMIN — Medication 2: at 21:57

## 2018-03-19 RX ADMIN — Medication 50 MILLILITER(S): at 07:16

## 2018-03-19 RX ADMIN — Medication 50 MILLILITER(S): at 15:00

## 2018-03-19 RX ADMIN — ARGATROBAN 0.45 MICROGRAM(S)/KG/MIN: 50 INJECTION, SOLUTION INTRAVENOUS at 06:39

## 2018-03-19 RX ADMIN — ISOSORBIDE DINITRATE 5 MILLIGRAM(S): 5 TABLET ORAL at 12:47

## 2018-03-19 RX ADMIN — Medication 5 MILLIGRAM(S): at 06:28

## 2018-03-19 RX ADMIN — PIPERACILLIN AND TAZOBACTAM 200 GRAM(S): 4; .5 INJECTION, POWDER, LYOPHILIZED, FOR SOLUTION INTRAVENOUS at 12:49

## 2018-03-19 RX ADMIN — Medication 50 MILLILITER(S): at 16:45

## 2018-03-19 RX ADMIN — Medication 50 MILLIGRAM(S): at 06:24

## 2018-03-19 RX ADMIN — ISOSORBIDE DINITRATE 5 MILLIGRAM(S): 5 TABLET ORAL at 06:28

## 2018-03-19 RX ADMIN — INSULIN GLARGINE 22 UNIT(S): 100 INJECTION, SOLUTION SUBCUTANEOUS at 21:57

## 2018-03-19 RX ADMIN — PIPERACILLIN AND TAZOBACTAM 200 GRAM(S): 4; .5 INJECTION, POWDER, LYOPHILIZED, FOR SOLUTION INTRAVENOUS at 06:24

## 2018-03-19 RX ADMIN — Medication 100 MILLIGRAM(S): at 12:47

## 2018-03-19 RX ADMIN — ARGATROBAN 0.9 MICROGRAM(S)/KG/MIN: 50 INJECTION, SOLUTION INTRAVENOUS at 02:30

## 2018-03-19 RX ADMIN — Medication 5 MILLIGRAM(S): at 12:47

## 2018-03-19 RX ADMIN — Medication 50 MILLILITER(S): at 00:46

## 2018-03-19 RX ADMIN — Medication 2: at 12:48

## 2018-03-19 RX ADMIN — Medication 2: at 19:00

## 2018-03-19 RX ADMIN — Medication 1 TABLET(S): at 12:47

## 2018-03-19 RX ADMIN — WARFARIN SODIUM 2.5 MILLIGRAM(S): 2.5 TABLET ORAL at 21:58

## 2018-03-19 NOTE — CONSULT NOTE ADULT - ASSESSMENT
62 yo M history of HFrEF 10-15% 2/2 ischemic cardiomyopathy, MI , s/p AICD vs PPM, ?Afib, Hypertension, Diabetes Mellitus Type 2 on insulin, CKD?and gout, who presents with a chief complaint of generalized weakness admitted for severe sepsis 2/2 LE cellulitis vs pneumonia

## 2018-03-19 NOTE — CONSULT NOTE ADULT - PROBLEM SELECTOR PROBLEM 2
Hyperkalemia
Acute renal failure, unspecified acute renal failure type
Acute on chronic systolic heart failure

## 2018-03-19 NOTE — CONSULT NOTE ADULT - SUBJECTIVE AND OBJECTIVE BOX
HUNTER BRIZUELA   [id number]     (1955):     HPI:  62 yo M history of HFrEF 10-15% 2/2 ischemic cardiomyopathy, MI , s/p AICD vs PPM, ?Afib, Hypertension, Diabetes Mellitus Type 2 on insulin, CKD?and gout, who presents with a chief complaint of generalized weakness. Pt wad admitted to [facility] 2 months ago for gout and was sent to rehab. He was discharged home mid Feb with VNS. In the past 2 weeks patient has noted increased leg pain and weakness bilaterally. In the last few days, he has also experienced generalized weakness prompting him to come to ER.   In ER, noted to have t max of 102, SBP 70s, leukocytosis to 32.8, Lactate of 6.6, Acute renal failure with severe metabolic derangements. Given 3.5L fluids and Vanc/Zosyn. MICU consulted. (10 Mar 2018 18:51)    Pt has been hospitalized x 9 days.  Pt has had a complicated course including pleural effusion with chest tube placement, renal failure with both CVVHD and now HD and significant hypotension requiring vasopressors.  Pt if now off pressors but has HIT and is on agatroban.  Pt continues to have short bursts of VT.    PAST MEDICAL & SURGICAL HISTORY:  Type 2 diabetes mellitus with diabetic peripheral angiopathy without gangrene,   Essential hypertension, benign  Gout  Pacemaker  Chronic systolic heart failure  Myocardial infarction  No significant past surgical history    FAMILY HISTORY:  Reviewed and not pertinent    ROS:   no complaints at this time, pt reports feeling "better"  and "comfortable"  Unable to attain due to:                      Dyspnea (Heriberto 0-10):  0/10                      N/V (Y/N):     no                         Secretions (Y/N):  no                Agitation(Y/N): no  Pain (Y/N):      no  -Provocation/Palliation:  -Quality/Quantity:  -Radiating:  -Severity:  -Timing/Frequency:  -Impact on ADLs:    General:  + unintentional weight loss of 15-20 lbs in the weeks prior to admission  HEENT:    Denied  Neck:  Denied  CVS:  Denied  Resp:  no dyspnea  GI:  Denied  :  Denied  Musc:  weak  Neuro:  sleepy  Psych:  Denies anxiety and depression  Skin:  Denied  Lymph:  Denied    Allergies:  No Known Allergies    Intolerances    Opiate Naive (Y/N):   yes  -iStop reviewed (Y/N):  yes ref # [id number]    Medications:      MEDICATIONS  (STANDING):  albumin human 25% IVPB 50 milliLiter(s) IV Intermittent every 1 hour  albumin human 25% IVPB 50 milliLiter(s) IV Intermittent every 1 hour  albumin human 25% IVPB 50 milliLiter(s) IV Intermittent every 6 hours  argatroban Infusion 0.094 MICROgram(s)/kG/Min (0.45 mL/Hr) IV Continuous <Continuous>  bisacodyl 5 milliGRAM(s) Oral daily  dextrose 5%. 1000 milliLiter(s) (50 mL/Hr) IV Continuous <Continuous>  dextrose 50% Injectable 12.5 Gram(s) IV Push once  dextrose 50% Injectable 25 Gram(s) IV Push once  dextrose 50% Injectable 25 Gram(s) IV Push once  folic acid 1 milliGRAM(s) Oral daily  hydrALAZINE Injectable 5 milliGRAM(s) IV Push every 6 hours  hydrocortisone sodium succinate Injectable 50 milliGRAM(s) IV Push every 12 hours  insulin glargine Injectable (LANTUS) 20 Unit(s) SubCutaneous at bedtime  insulin lispro (HumaLOG) corrective regimen sliding scale   SubCutaneous Before meals and at bedtime  isosorbide   dinitrate Tablet (ISORDIL) 5 milliGRAM(s) Oral three times a day  multivitamin 1 Tablet(s) Oral daily  piperacillin/tazobactam IVPB. 2.25 Gram(s) IV Intermittent every 8 hours  thiamine 100 milliGRAM(s) Oral daily    MEDICATIONS  (PRN):  acetaminophen   Tablet. 650 milliGRAM(s) Oral every 6 hours PRN Moderate Pain (4 - 6)  dextrose Gel 1 Dose(s) Oral once PRN Blood Glucose LESS THAN 70 milliGRAM(s)/deciliter  glucagon  Injectable 1 milliGRAM(s) IntraMuscular once PRN Glucose LESS THAN 70 milligrams/deciliter    Labs:    CBC:                        8.7    18.7  )-----------( 77       ( 19 Mar 2018 04:26 )             27.7     CMP:        137  |  100  |  54<H>  ----------------------------<  215<H>  3.6   |  21<L>  |  3.24<H>    Ca    8.7      19 Mar 2018 04:26  Phos  4.0       Mg     1.9         TPro  6.6  /  Alb  3.7  /  TBili  3.0<H>  /  DBili  1.2<H>  /  AST  15  /  ALT  15  /  AlkPhos  66      PT/INR - ( 19 Mar 2018 04:26 )   PT: 35.9 sec;   INR: 3.16     PTT - ( 19 Mar 2018 09:34 )  PTT:99.3 sec      Imaging:  Reviewed      EXAM:  NM INFLAMMATORY LOC GALLIUM WB                          PROCEDURE DATE:  2018         ******PRELIMINARY REPORT******    ******PRELIMINARY REPORT******      INTERPRETATION:  RESIDENT PRELIMINARY REPORT    INFLAMMATION IMAGING - WHOLE-BODY    Indication: Rule out infectious focus. Cellulitis not improving on   antibiotic therapy.    Procedure: The patient received an intravenous injection of 4.91 mCi of   gallium-67 citrate on date and images of the whole-body were obtained at   about 24 hours.    Findings: There is abnormally increased activity of the anterior and   posterior aspects of the left lower leg. Findings are suggestive of   cellulitis. No focal increased uptake is identified to suggest   phlegmon/abscess. There is slightly increased activity at the sacroiliac   joints bilaterally. No other abnormal uptake is identified.    Impression: Findings suggestive of cellulitis as above.    < end of copied text >    PEx:  T(C): 36.3 (18 @ 09:04), Max: 36.3 (18 @ 15:00)  HR: 88 (18 @ 08:00) (82 - 96)  BP: --  RR: 24 (18 @ 08:00) (12 - 29)  SpO2: 100% (18 @ 08:00) (98% - 100%)  Wt(kg): 80 kgs    POCT Blood Glucose.: 193 mg/dL (19 Mar 2018 11:18)    I&O's Summary    18 Mar 2018 07:01  -  19 Mar 2018 07:00  --------------------------------------------------------  IN: 510.4 mL / OUT: 0 mL / NET: 510.4 mL    19 Mar 2018 07:01  -  19 Mar 2018 13:01  --------------------------------------------------------  IN: 0.5 mL / OUT: 0 mL / NET: 0.5 mL      General:  ill appearing, thin, not distressed   HEENT:  nc/at, thin, slight temporal wasting, moist oral cavity  Neck:   supple, neg lymphadenopathy, + R HD catheter, L IJ TLC  CVS:  irreg, frequent ventricular ectopy, rate controlled, paced noted to  L chest wall  Resp:  non-labored, on NC  GI:   large, distended slightly, soft, + BS nontender, slight dependent edema  :  not examined, no barnes present  Musc:   weak, thin, follows commands  Neuro:  awake, slightly delayed, orientated x 3, has poor recall of recent events  Psych:   mood appropriate for circumstances  Skin:  thin, dry, cool, + generalized edema  Lymph:  neg  Preadmit Karnofsky:   50 %           Current Karnofsky:   30    %  Cachexia (Y/N):   yes  BMI:  22    Advanced Directives:     Full Code     HCP noted on chart          DPOA  (for finances)         Decision maker:   pt was deemed not to have decision making capacity on  by psychiatry  Legal surrogate:  pts dgt Cristal Castro [phone] is pts HCP    Social History:   single, lives by self, pt has a HHA 4hrs/day, 3 days per week.  Pt has 2 adult children (Cristal aged 37) and pts son, is 24 and lives in [facility]. 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    He is retired but reports he is not on disability.   Pt is "spiritual" and practices a "Faith" raquel background  Pt is open to massage therapy and music therapy.   Per notes, pt has not been compliant with his medication (not picked up prescription from his outpt pharmacy) since 2017.    GOALS OF CARE DISCUSSION:       Palliative care info/counseling provided	           Family meeting       Advanced Directives addressed please see Advance Care Planning Note	           See previous Palliative Medicine Note       Documentation of GOC: 	          REFERRALS:	        Palliative Med        Unit SW/Case Mgmt       - referred       Massage Therapy-- referred       Music Therapy-- referred       Nutrition-- following

## 2018-03-19 NOTE — CONSULT NOTE ADULT - PROBLEM SELECTOR PROBLEM 5
Anemia due to chronic kidney disease
Palliative care by specialist
Type 2 diabetes mellitus with hyperglycemia, with long-term current use of insulin

## 2018-03-19 NOTE — PROGRESS NOTE ADULT - PROBLEM SELECTOR PLAN 1
- oliguric GILMER from ATn from septic shock   - started on CVVHD and now switch to IHD   - last HD session done on 3/17 1 .5 liters   - we will do HD today for clearance and UF   - volume status on the wet side   - in and outs   - daily weight   - electrolytes noted

## 2018-03-19 NOTE — PROGRESS NOTE ADULT - SUBJECTIVE AND OBJECTIVE BOX
Subjective:  Patient Mental status improved and awake, Patient is AAO x 2-3  He has no complaints to today    PAST MEDICAL & SURGICAL HISTORY:  Type 2 diabetes mellitus with diabetic peripheral angiopathy without gangrene, with long-term curren  Essential hypertension, benign  Gout  Pacemaker  Chronic systolic heart failure  Myocardial infarction  No significant past surgical history      Vital Signs Last 24 Hrs  T(C): 36.3 (19 Mar 2018 09:04), Max: 36.3 (18 Mar 2018 15:00)  T(F): 97.3 (19 Mar 2018 09:04), Max: 97.4 (18 Mar 2018 21:59)  HR: 88 (19 Mar 2018 08:00) (82 - 96)  BP: --  BP(mean): --  RR: 24 (19 Mar 2018 08:00) (12 - 29)  SpO2: 100% (19 Mar 2018 08:00) (96% - 100%)   I&O's Detail    18 Mar 2018 07:01  -  19 Mar 2018 07:00  --------------------------------------------------------  IN:    Albumin 5%  - 250 mL: 100 mL    argatroban Infusion: 16.8 mL    argatroban Infusion: 13.5 mL    argatroban Infusion: 3.6 mL    argatroban Infusion: 0.9 mL    DOBUTamine Infusion: 57.6 mL    heparin Infusion: 18 mL    IV PiggyBack: 100 mL    Solution: 200 mL  Total IN: 510.4 mL    OUT:  Total OUT: 0 mL    Total NET: 510.4 mL      19 Mar 2018 07:01  -  19 Mar 2018 10:21  --------------------------------------------------------  IN:    argatroban Infusion: 0.5 mL  Total IN: 0.5 mL    OUT:  Total OUT: 0 mL    Total NET: 0.5 mL        Daily     Daily     Physical Exam:   General: AAOx2-3  HEENT: NC/AT, PERRL  Neck: supple  Respiratory: CTAB   Cardiac: Tachycardic, +S1/S2, no murmurs, rubs, or gallops  Gastrointestinal: soft, NT/ND; no rebound or guarding; +BS  Genitourinary: Testicular edema   Extremities: Warm to touch. Bilateral chronic venous stasis areas of skin breaks. R ankles with dry dressing. SCDs in place, +1-2 pitting edema   Musculoskeletal: NROM x4; no joint swelling, tenderness or erythema      MEDICATIONS  (STANDING):  albumin human 25% IVPB 50 milliLiter(s) IV Intermittent every 1 hour  albumin human 25% IVPB 50 milliLiter(s) IV Intermittent every 6 hours  argatroban Infusion 0.094 MICROgram(s)/kG/Min (0.45 mL/Hr) IV Continuous <Continuous>  bisacodyl 5 milliGRAM(s) Oral daily  dextrose 5%. 1000 milliLiter(s) (50 mL/Hr) IV Continuous <Continuous>  dextrose 50% Injectable 12.5 Gram(s) IV Push once  dextrose 50% Injectable 25 Gram(s) IV Push once  dextrose 50% Injectable 25 Gram(s) IV Push once  folic acid 1 milliGRAM(s) Oral daily  hydrALAZINE Injectable 5 milliGRAM(s) IV Push every 6 hours  hydrocortisone sodium succinate Injectable 50 milliGRAM(s) IV Push every 12 hours  insulin glargine Injectable (LANTUS) 20 Unit(s) SubCutaneous at bedtime  insulin lispro (HumaLOG) corrective regimen sliding scale   SubCutaneous Before meals and at bedtime  isosorbide   dinitrate Tablet (ISORDIL) 5 milliGRAM(s) Oral three times a day  multivitamin 1 Tablet(s) Oral daily  piperacillin/tazobactam IVPB. 2.25 Gram(s) IV Intermittent every 8 hours  thiamine 100 milliGRAM(s) Oral daily      LABS:                        8.7    18.7  )-----------( 77       ( 19 Mar 2018 04:26 )             27.7     03-19    137  |  100  |  54<H>  ----------------------------<  215<H>  3.6   |  21<L>  |  3.24<H>    Ca    8.7      19 Mar 2018 04:26  Phos  4.0     03-19  Mg     1.9     03-19    TPro  6.6  /  Alb  3.7  /  TBili  3.0<H>  /  DBili  1.2<H>  /  AST  15  /  ALT  15  /  AlkPhos  66  03-19        PT/INR - ( 19 Mar 2018 04:26 )   PT: 35.9 sec;   INR: 3.16          PTT - ( 19 Mar 2018 09:34 )  PTT:99.3 sec      RADIOLOGY & ADDITIONAL STUDIES:  	    ECG:    < from: 12 Lead ECG (03.10.18 @ 13:17) >  Ventricular Rate 125 BPM    Atrial Rate 141 BPM    QRS Duration 76 ms    Q-T Interval 284 ms    QTC Calculation(Bezet) 409 ms    R Axis 261 degrees    T Axis 23 degrees    Diagnosis Line Accelerated Junctional rhythm with fusion complexes  Low voltage QRS  Inferior infarct , age undetermined  Possible Anterolateral infarct , age undetermined  Abnormal ECG      < from: Echocardiogram (03.12.18 @ 14:19) >  Interpretation Summary  Preserved left ventricular thickness. The left ventricle is borderline   dilated.   Severe hypokinesis of the left ventricle. Contractility is preserved in   basal   anterior, basal inferior, and basal inferolateral. The left ventricular   ejection fraction is severely reduced.  The left ventricular ejection   fraction   is 15%.The right ventricle is borderline dilated. The right ventricular   systolic function is mildly reduced.  The left atrium is mildly dilated.   The   left atrial volume index is 35 cc/m2 (normal <34cc/m2)  The right atrium   is   mildly dilated. Mildly calcified trileaflet aortic valve. There is trace   aortic regurgitation.  Tethered mitral valve leaflets with normal   opening.There is mild mitral regurgitation.Structurally normal tricuspid   valve. There is moderate tricuspid regurgitation. There is moderate   pulmonary   hypertension. The pulmonary artery systolic pressure is estimated to be   54   mmHg.  Structurally normal pulmonic valve. There is trace pulmonic   regurgitation.A small pericardial effusion noted. Bilateral pleural   effusion   noted.No prior echo is available for comparison. Subjective:  Patient Mental status improved and awake, Patient is AAO x 2-3  He has no complaints to today.  Patient dobutamine was d/c'd after better bp control  Patient was switched to argatroban instead of heparin for suspected HIT    PAST MEDICAL & SURGICAL HISTORY:  Type 2 diabetes mellitus with diabetic peripheral angiopathy without gangrene, with long-term curren  Essential hypertension, benign  Gout  Pacemaker  Chronic systolic heart failure  Myocardial infarction  No significant past surgical history      Vital Signs Last 24 Hrs  T(C): 36.3 (19 Mar 2018 09:04), Max: 36.3 (18 Mar 2018 15:00)  T(F): 97.3 (19 Mar 2018 09:04), Max: 97.4 (18 Mar 2018 21:59)  HR: 88 (19 Mar 2018 08:00) (82 - 96)  BP: --  BP(mean): --  RR: 24 (19 Mar 2018 08:00) (12 - 29)  SpO2: 100% (19 Mar 2018 08:00) (96% - 100%)   I&O's Detail    18 Mar 2018 07:01  -  19 Mar 2018 07:00  --------------------------------------------------------  IN:    Albumin 5%  - 250 mL: 100 mL    argatroban Infusion: 16.8 mL    argatroban Infusion: 13.5 mL    argatroban Infusion: 3.6 mL    argatroban Infusion: 0.9 mL    DOBUTamine Infusion: 57.6 mL    heparin Infusion: 18 mL    IV PiggyBack: 100 mL    Solution: 200 mL  Total IN: 510.4 mL    OUT:  Total OUT: 0 mL    Total NET: 510.4 mL      19 Mar 2018 07:01  -  19 Mar 2018 10:21  --------------------------------------------------------  IN:    argatroban Infusion: 0.5 mL  Total IN: 0.5 mL    OUT:  Total OUT: 0 mL    Total NET: 0.5 mL        Daily     Daily     Physical Exam:   General: AAOx2-3  HEENT: NC/AT, PERRL  Neck: supple  Respiratory: CTAB   Cardiac: Tachycardic, +S1/S2, no murmurs, rubs, or gallops  Gastrointestinal: soft, NT/ND; no rebound or guarding; +BS  Genitourinary: Testicular edema   Extremities: Warm to touch. Bilateral chronic venous stasis areas of skin breaks. R ankles with dry dressing. SCDs in place, +1-2 pitting edema   Musculoskeletal: NROM x4; no joint swelling, tenderness or erythema      MEDICATIONS  (STANDING):  albumin human 25% IVPB 50 milliLiter(s) IV Intermittent every 1 hour  albumin human 25% IVPB 50 milliLiter(s) IV Intermittent every 6 hours  argatroban Infusion 0.094 MICROgram(s)/kG/Min (0.45 mL/Hr) IV Continuous <Continuous>  bisacodyl 5 milliGRAM(s) Oral daily  dextrose 5%. 1000 milliLiter(s) (50 mL/Hr) IV Continuous <Continuous>  dextrose 50% Injectable 12.5 Gram(s) IV Push once  dextrose 50% Injectable 25 Gram(s) IV Push once  dextrose 50% Injectable 25 Gram(s) IV Push once  folic acid 1 milliGRAM(s) Oral daily  hydrALAZINE Injectable 5 milliGRAM(s) IV Push every 6 hours  hydrocortisone sodium succinate Injectable 50 milliGRAM(s) IV Push every 12 hours  insulin glargine Injectable (LANTUS) 20 Unit(s) SubCutaneous at bedtime  insulin lispro (HumaLOG) corrective regimen sliding scale   SubCutaneous Before meals and at bedtime  isosorbide   dinitrate Tablet (ISORDIL) 5 milliGRAM(s) Oral three times a day  multivitamin 1 Tablet(s) Oral daily  piperacillin/tazobactam IVPB. 2.25 Gram(s) IV Intermittent every 8 hours  thiamine 100 milliGRAM(s) Oral daily      LABS:                        8.7    18.7  )-----------( 77       ( 19 Mar 2018 04:26 )             27.7     03-19    137  |  100  |  54<H>  ----------------------------<  215<H>  3.6   |  21<L>  |  3.24<H>    Ca    8.7      19 Mar 2018 04:26  Phos  4.0     03-19  Mg     1.9     03-19    TPro  6.6  /  Alb  3.7  /  TBili  3.0<H>  /  DBili  1.2<H>  /  AST  15  /  ALT  15  /  AlkPhos  66  03-19        PT/INR - ( 19 Mar 2018 04:26 )   PT: 35.9 sec;   INR: 3.16          PTT - ( 19 Mar 2018 09:34 )  PTT:99.3 sec      RADIOLOGY & ADDITIONAL STUDIES:  	    ECG:    < from: 12 Lead ECG (03.10.18 @ 13:17) >  Ventricular Rate 125 BPM    Atrial Rate 141 BPM    QRS Duration 76 ms    Q-T Interval 284 ms    QTC Calculation(Bezet) 409 ms    R Axis 261 degrees    T Axis 23 degrees    Diagnosis Line Accelerated Junctional rhythm with fusion complexes  Low voltage QRS  Inferior infarct , age undetermined  Possible Anterolateral infarct , age undetermined  Abnormal ECG      < from: Echocardiogram (03.12.18 @ 14:19) >  Interpretation Summary  Preserved left ventricular thickness. The left ventricle is borderline   dilated.   Severe hypokinesis of the left ventricle. Contractility is preserved in   basal   anterior, basal inferior, and basal inferolateral. The left ventricular   ejection fraction is severely reduced.  The left ventricular ejection   fraction   is 15%.The right ventricle is borderline dilated. The right ventricular   systolic function is mildly reduced.  The left atrium is mildly dilated.   The   left atrial volume index is 35 cc/m2 (normal <34cc/m2)  The right atrium   is   mildly dilated. Mildly calcified trileaflet aortic valve. There is trace   aortic regurgitation.  Tethered mitral valve leaflets with normal   opening.There is mild mitral regurgitation.Structurally normal tricuspid   valve. There is moderate tricuspid regurgitation. There is moderate   pulmonary   hypertension. The pulmonary artery systolic pressure is estimated to be   54   mmHg.  Structurally normal pulmonic valve. There is trace pulmonic   regurgitation.A small pericardial effusion noted. Bilateral pleural   effusion   noted.No prior echo is available for comparison. Subjective:  Patient Mental status improved and awake, Patient is AAO x 2-3  He has no complaints today.  Patient dobutamine was d/c'd after better bp control  Patient was switched to argatroban instead of heparin for suspected HIT    PAST MEDICAL & SURGICAL HISTORY:  Type 2 diabetes mellitus with diabetic peripheral angiopathy without gangrene, with long-term curren  Essential hypertension, benign  Gout  Pacemaker  Chronic systolic heart failure  Myocardial infarction  No significant past surgical history      Vital Signs Last 24 Hrs  T(C): 36.3 (19 Mar 2018 09:04), Max: 36.3 (18 Mar 2018 15:00)  T(F): 97.3 (19 Mar 2018 09:04), Max: 97.4 (18 Mar 2018 21:59)  HR: 88 (19 Mar 2018 08:00) (82 - 96)  BP: --  BP(mean): --  RR: 24 (19 Mar 2018 08:00) (12 - 29)  SpO2: 100% (19 Mar 2018 08:00) (96% - 100%)   I&O's Detail    18 Mar 2018 07:01  -  19 Mar 2018 07:00  --------------------------------------------------------  IN:    Albumin 5%  - 250 mL: 100 mL    argatroban Infusion: 16.8 mL    argatroban Infusion: 13.5 mL    argatroban Infusion: 3.6 mL    argatroban Infusion: 0.9 mL    DOBUTamine Infusion: 57.6 mL    heparin Infusion: 18 mL    IV PiggyBack: 100 mL    Solution: 200 mL  Total IN: 510.4 mL    OUT:  Total OUT: 0 mL    Total NET: 510.4 mL      19 Mar 2018 07:01  -  19 Mar 2018 10:21  --------------------------------------------------------  IN:    argatroban Infusion: 0.5 mL  Total IN: 0.5 mL    OUT:  Total OUT: 0 mL    Total NET: 0.5 mL        Daily     Daily     Physical Exam:   General: AAOx2-3  HEENT: NC/AT, PERRL  Neck: supple  Respiratory: CTAB   Cardiac: Tachycardic, +S1/S2, no murmurs, rubs, or gallops  Gastrointestinal: soft, NT/ND; no rebound or guarding; +BS  Genitourinary: Testicular edema   Extremities: Warm to touch. Bilateral chronic venous stasis areas of skin breaks. R ankles with dry dressing. SCDs in place, +1-2 pitting edema   Musculoskeletal: NROM x4; no joint swelling, tenderness or erythema      MEDICATIONS  (STANDING):  albumin human 25% IVPB 50 milliLiter(s) IV Intermittent every 1 hour  albumin human 25% IVPB 50 milliLiter(s) IV Intermittent every 6 hours  argatroban Infusion 0.094 MICROgram(s)/kG/Min (0.45 mL/Hr) IV Continuous <Continuous>  bisacodyl 5 milliGRAM(s) Oral daily  dextrose 5%. 1000 milliLiter(s) (50 mL/Hr) IV Continuous <Continuous>  dextrose 50% Injectable 12.5 Gram(s) IV Push once  dextrose 50% Injectable 25 Gram(s) IV Push once  dextrose 50% Injectable 25 Gram(s) IV Push once  folic acid 1 milliGRAM(s) Oral daily  hydrALAZINE Injectable 5 milliGRAM(s) IV Push every 6 hours  hydrocortisone sodium succinate Injectable 50 milliGRAM(s) IV Push every 12 hours  insulin glargine Injectable (LANTUS) 20 Unit(s) SubCutaneous at bedtime  insulin lispro (HumaLOG) corrective regimen sliding scale   SubCutaneous Before meals and at bedtime  isosorbide   dinitrate Tablet (ISORDIL) 5 milliGRAM(s) Oral three times a day  multivitamin 1 Tablet(s) Oral daily  piperacillin/tazobactam IVPB. 2.25 Gram(s) IV Intermittent every 8 hours  thiamine 100 milliGRAM(s) Oral daily      LABS:                        8.7    18.7  )-----------( 77       ( 19 Mar 2018 04:26 )             27.7     03-19    137  |  100  |  54<H>  ----------------------------<  215<H>  3.6   |  21<L>  |  3.24<H>    Ca    8.7      19 Mar 2018 04:26  Phos  4.0     03-19  Mg     1.9     03-19    TPro  6.6  /  Alb  3.7  /  TBili  3.0<H>  /  DBili  1.2<H>  /  AST  15  /  ALT  15  /  AlkPhos  66  03-19        PT/INR - ( 19 Mar 2018 04:26 )   PT: 35.9 sec;   INR: 3.16          PTT - ( 19 Mar 2018 09:34 )  PTT:99.3 sec      RADIOLOGY & ADDITIONAL STUDIES:  	    ECG:    < from: 12 Lead ECG (03.10.18 @ 13:17) >  Ventricular Rate 125 BPM    Atrial Rate 141 BPM    QRS Duration 76 ms    Q-T Interval 284 ms    QTC Calculation(Bezet) 409 ms    R Axis 261 degrees    T Axis 23 degrees    Diagnosis Line Accelerated Junctional rhythm with fusion complexes  Low voltage QRS  Inferior infarct , age undetermined  Possible Anterolateral infarct , age undetermined  Abnormal ECG      < from: Echocardiogram (03.12.18 @ 14:19) >  Interpretation Summary  Preserved left ventricular thickness. The left ventricle is borderline   dilated.   Severe hypokinesis of the left ventricle. Contractility is preserved in   basal   anterior, basal inferior, and basal inferolateral. The left ventricular   ejection fraction is severely reduced.  The left ventricular ejection   fraction   is 15%.The right ventricle is borderline dilated. The right ventricular   systolic function is mildly reduced.  The left atrium is mildly dilated.   The   left atrial volume index is 35 cc/m2 (normal <34cc/m2)  The right atrium   is   mildly dilated. Mildly calcified trileaflet aortic valve. There is trace   aortic regurgitation.  Tethered mitral valve leaflets with normal   opening.There is mild mitral regurgitation.Structurally normal tricuspid   valve. There is moderate tricuspid regurgitation. There is moderate   pulmonary   hypertension. The pulmonary artery systolic pressure is estimated to be   54   mmHg.  Structurally normal pulmonic valve. There is trace pulmonic   regurgitation.A small pericardial effusion noted. Bilateral pleural   effusion   noted.No prior echo is available for comparison.

## 2018-03-19 NOTE — PROGRESS NOTE ADULT - SUBJECTIVE AND OBJECTIVE BOX
INTERVAL HPI/OVERNIGHT EVENTS: Adjusted Argatroban twice given PTT >100     SUBJECTIVE: Patient seen and examined at bedside. More awake this AM. AOx3. Still feels     OBJECTIVE:    VITAL SIGNS:  ICU Vital Signs Last 24 Hrs  T(C): 36.3 (19 Mar 2018 09:04), Max: 36.3 (18 Mar 2018 15:00)  T(F): 97.3 (19 Mar 2018 09:04), Max: 97.4 (18 Mar 2018 21:59)  HR: 88 (19 Mar 2018 08:00) (82 - 96)  BP: --  BP(mean): --  ABP: 102/64 (19 Mar 2018 08:00) (92/60 - 108/68)  ABP(mean): 76 (19 Mar 2018 08:00) (70 - 80)  RR: 24 (19 Mar 2018 08:00) (12 - 29)  SpO2: 100% (19 Mar 2018 08:00) (97% - 100%)        03-18 @ 07:01  -  03-19 @ 07:00  --------------------------------------------------------  IN: 510.4 mL / OUT: 0 mL / NET: 510.4 mL    03-19 @ 07:01  -  03-19 @ 11:43  --------------------------------------------------------  IN: 0.5 mL / OUT: 0 mL / NET: 0.5 mL      CAPILLARY BLOOD GLUCOSE      POCT Blood Glucose.: 193 mg/dL (19 Mar 2018 11:18)      PHYSICAL EXAM:    General: NAD  HEENT: NC/AT; PERRL, clear conjunctiva  Neck: supple  Respiratory: CTA b/l  Cardiovascular: +S1/S2; RRR  Abdomen: soft, NT/ND; +BS x4  Extremities:  no LE edema  Vascular: WWP, 2+ peripheral pulses b/l;  Skin: normal color and turgor; no rash  Neurological: A&Ox3, move all extremities. CN II-XII intact    MEDICATIONS:  MEDICATIONS  (STANDING):  albumin human 25% IVPB 50 milliLiter(s) IV Intermittent every 1 hour  albumin human 25% IVPB 50 milliLiter(s) IV Intermittent every 6 hours  argatroban Infusion 0.094 MICROgram(s)/kG/Min (0.45 mL/Hr) IV Continuous <Continuous>  bisacodyl 5 milliGRAM(s) Oral daily  dextrose 5%. 1000 milliLiter(s) (50 mL/Hr) IV Continuous <Continuous>  dextrose 50% Injectable 12.5 Gram(s) IV Push once  dextrose 50% Injectable 25 Gram(s) IV Push once  dextrose 50% Injectable 25 Gram(s) IV Push once  folic acid 1 milliGRAM(s) Oral daily  hydrALAZINE Injectable 5 milliGRAM(s) IV Push every 6 hours  hydrocortisone sodium succinate Injectable 50 milliGRAM(s) IV Push every 12 hours  insulin glargine Injectable (LANTUS) 20 Unit(s) SubCutaneous at bedtime  insulin lispro (HumaLOG) corrective regimen sliding scale   SubCutaneous Before meals and at bedtime  isosorbide   dinitrate Tablet (ISORDIL) 5 milliGRAM(s) Oral three times a day  multivitamin 1 Tablet(s) Oral daily  piperacillin/tazobactam IVPB. 2.25 Gram(s) IV Intermittent every 8 hours  thiamine 100 milliGRAM(s) Oral daily    MEDICATIONS  (PRN):  acetaminophen   Tablet. 650 milliGRAM(s) Oral every 6 hours PRN Moderate Pain (4 - 6)  dextrose Gel 1 Dose(s) Oral once PRN Blood Glucose LESS THAN 70 milliGRAM(s)/deciliter  glucagon  Injectable 1 milliGRAM(s) IntraMuscular once PRN Glucose LESS THAN 70 milligrams/deciliter      ALLERGIES:  Allergies    No Known Allergies    Intolerances        LABS:                        8.7    18.7  )-----------( 77       ( 19 Mar 2018 04:26 )             27.7     03-19    137  |  100  |  54<H>  ----------------------------<  215<H>  3.6   |  21<L>  |  3.24<H>    Ca    8.7      19 Mar 2018 04:26  Phos  4.0     03-19  Mg     1.9     03-19    TPro  6.6  /  Alb  3.7  /  TBili  3.0<H>  /  DBili  1.2<H>  /  AST  15  /  ALT  15  /  AlkPhos  66  03-19    PT/INR - ( 19 Mar 2018 04:26 )   PT: 35.9 sec;   INR: 3.16          PTT - ( 19 Mar 2018 09:34 )  PTT:99.3 sec      RADIOLOGY & ADDITIONAL TESTS: Reviewed.- INTERVAL HPI/OVERNIGHT EVENTS: Adjusted Argatroban twice given PTT >100.     SUBJECTIVE: Patient seen and examined at bedside. More awake this AM. AOx3. Still feels tired and weak. continues to have non productive cough     OBJECTIVE:    VITAL SIGNS:  ICU Vital Signs Last 24 Hrs  T(C): 36.3 (19 Mar 2018 09:04), Max: 36.3 (18 Mar 2018 15:00)  T(F): 97.3 (19 Mar 2018 09:04), Max: 97.4 (18 Mar 2018 21:59)  HR: 88 (19 Mar 2018 08:00) (82 - 96)  BP: --  BP(mean): --  ABP: 102/64 (19 Mar 2018 08:00) (92/60 - 108/68)  ABP(mean): 76 (19 Mar 2018 08:00) (70 - 80)  RR: 24 (19 Mar 2018 08:00) (12 - 29)  SpO2: 100% (19 Mar 2018 08:00) (97% - 100%)        03-18 @ 07:01 - 03-19 @ 07:00  --------------------------------------------------------  IN: 510.4 mL / OUT: 0 mL / NET: 510.4 mL    03-19 @ 07:01 - 03-19 @ 11:43  --------------------------------------------------------  IN: 0.5 mL / OUT: 0 mL / NET: 0.5 mL      CAPILLARY BLOOD GLUCOSE      POCT Blood Glucose.: 193 mg/dL (19 Mar 2018 11:18)      PHYSICAL EXAM:  General: Pt AOx3.   HEENT: NC/AT, PERRL  Neck: supple L IJ in place. R HD cath   Respiratory: Diffuse crackles b/l L>R   Cardiac: +S1/S2, no murmurs, rubs, or gallops  Gastrointestinal: Soft nd/nt, no rebound or guarding. +BS  Genitourinary: Testicular edema   Extremities: 1+ edema to knees. warm to touch.  b/l areas of venous stasis. RLE with dry dressing  Skin: no rash  MSK: no joint swelling  Vasc: 1+ DP pulses    MEDICATIONS:  MEDICATIONS  (STANDING):  albumin human 25% IVPB 50 milliLiter(s) IV Intermittent every 1 hour  albumin human 25% IVPB 50 milliLiter(s) IV Intermittent every 6 hours  argatroban Infusion 0.094 MICROgram(s)/kG/Min (0.45 mL/Hr) IV Continuous <Continuous>  bisacodyl 5 milliGRAM(s) Oral daily  dextrose 5%. 1000 milliLiter(s) (50 mL/Hr) IV Continuous <Continuous>  dextrose 50% Injectable 12.5 Gram(s) IV Push once  dextrose 50% Injectable 25 Gram(s) IV Push once  dextrose 50% Injectable 25 Gram(s) IV Push once  folic acid 1 milliGRAM(s) Oral daily  hydrALAZINE Injectable 5 milliGRAM(s) IV Push every 6 hours  hydrocortisone sodium succinate Injectable 50 milliGRAM(s) IV Push every 12 hours  insulin glargine Injectable (LANTUS) 20 Unit(s) SubCutaneous at bedtime  insulin lispro (HumaLOG) corrective regimen sliding scale   SubCutaneous Before meals and at bedtime  isosorbide   dinitrate Tablet (ISORDIL) 5 milliGRAM(s) Oral three times a day  multivitamin 1 Tablet(s) Oral daily  piperacillin/tazobactam IVPB. 2.25 Gram(s) IV Intermittent every 8 hours  thiamine 100 milliGRAM(s) Oral daily    MEDICATIONS  (PRN):  acetaminophen   Tablet. 650 milliGRAM(s) Oral every 6 hours PRN Moderate Pain (4 - 6)  dextrose Gel 1 Dose(s) Oral once PRN Blood Glucose LESS THAN 70 milliGRAM(s)/deciliter  glucagon  Injectable 1 milliGRAM(s) IntraMuscular once PRN Glucose LESS THAN 70 milligrams/deciliter      ALLERGIES:  Allergies    No Known Allergies    Intolerances        LABS:                        8.7    18.7  )-----------( 77       ( 19 Mar 2018 04:26 )             27.7     03-19    137  |  100  |  54<H>  ----------------------------<  215<H>  3.6   |  21<L>  |  3.24<H>    Ca    8.7      19 Mar 2018 04:26  Phos  4.0     03-19  Mg     1.9     03-19    TPro  6.6  /  Alb  3.7  /  TBili  3.0<H>  /  DBili  1.2<H>  /  AST  15  /  ALT  15  /  AlkPhos  66  03-19    PT/INR - ( 19 Mar 2018 04:26 )   PT: 35.9 sec;   INR: 3.16          PTT - ( 19 Mar 2018 09:34 )  PTT:99.3 sec      RADIOLOGY & ADDITIONAL TESTS: Reviewed.-

## 2018-03-19 NOTE — PROGRESS NOTE ADULT - PROBLEM SELECTOR PLAN 2
Acute on chronic systolic CHF with LVEF 15 % and severely low blood pressure  Daily weight   Strict I/o monitoring.  Monitor renal/urine out put.  - on hydrlazaine 5 mg three times a day   - on isosorbide 5 mg three times  a day

## 2018-03-19 NOTE — PROGRESS NOTE ADULT - SUBJECTIVE AND OBJECTIVE BOX
the patient seen in the afternoon in his bed, eating food from outside ([employer] and drinking soda), no shortness of breath, no chest pain, anuric. Last HD 3/17 - 1.5 liter. (On Friday was switch from CVVHD to regular HD - been on CVVHD apr. for 1 week).     The renal service follows the case for GILMER in the setting of septic shocked (improved) and CHF exacerbation. Still oliguric from ATN     Patient seen and examined at bedside.     acetaminophen   Tablet. 650 milliGRAM(s) every 6 hours PRN  albumin human 25% IVPB 50 milliLiter(s) every 1 hour  albumin human 25% IVPB 50 milliLiter(s) every 1 hour  albumin human 25% IVPB 50 milliLiter(s) every 6 hours  argatroban Infusion 0.063 MICROgram(s)/kG/Min <Continuous>  bisacodyl 5 milliGRAM(s) daily  dextrose 5%. 1000 milliLiter(s) <Continuous>  dextrose 50% Injectable 12.5 Gram(s) once  dextrose 50% Injectable 25 Gram(s) once  dextrose 50% Injectable 25 Gram(s) once  dextrose Gel 1 Dose(s) once PRN  escitalopram 5 milliGRAM(s) daily  folic acid 1 milliGRAM(s) daily  glucagon  Injectable 1 milliGRAM(s) once PRN  hydrALAZINE Injectable 5 milliGRAM(s) every 6 hours  hydrocortisone sodium succinate Injectable 50 milliGRAM(s) every 12 hours  insulin glargine Injectable (LANTUS) 20 Unit(s) at bedtime  insulin lispro (HumaLOG) corrective regimen sliding scale   Before meals and at bedtime  isosorbide   dinitrate Tablet (ISORDIL) 5 milliGRAM(s) three times a day  multivitamin 1 Tablet(s) daily  piperacillin/tazobactam IVPB. 2.25 Gram(s) every 8 hours  thiamine 100 milliGRAM(s) daily  warfarin 2.5 milliGRAM(s) once      Allergies    heparin (Unknown)    Intolerances        T(C): , Max: 36.3 (03-18-18 @ 21:59)  T(F): , Max: 97.4 (03-18-18 @ 21:59)  HR: 90 (03-19-18 @ 14:00)  BP: --  BP(mean): --  RR: 19 (03-19-18 @ 14:00)  SpO2: 99% (03-19-18 @ 14:00)  Wt(kg): --    03-18 @ 07:01  -  03-19 @ 07:00  --------------------------------------------------------  IN:    Albumin 5%  - 250 mL: 100 mL    argatroban Infusion: 16.8 mL    argatroban Infusion: 13.5 mL    argatroban Infusion: 3.6 mL    argatroban Infusion: 0.9 mL    DOBUTamine Infusion: 57.6 mL    heparin Infusion: 18 mL    IV PiggyBack: 100 mL    Solution: 200 mL  Total IN: 510.4 mL    OUT:  Total OUT: 0 mL    Total NET: 510.4 mL      03-19 @ 07:01  -  03-19 @ 15:09  --------------------------------------------------------  IN:    Albumin 5%  - 250 mL: 50 mL    argatroban Infusion: 3.2 mL    argatroban Infusion: 0.6 mL    Solution: 100 mL  Total IN: 153.8 mL    OUT:  Total OUT: 0 mL    Total NET: 153.8 mL    Physical exam:   Alert and oriented   No JVD   Normal air entry into the lungs, no wheezing, no crackles   RRR, normal s1, s2, no murmurs, rubs or gallops   Abdomen - soft, not tender, not distended   Extremities: no edema   HAs a HD catheter on the right of his neck          LABS:                        8.7    18.7  )-----------( 77       ( 19 Mar 2018 04:26 )             27.7     03-19    137  |  100  |  54<H>  ----------------------------<  215<H>  3.6   |  21<L>  |  3.24<H>    Ca    8.7      19 Mar 2018 04:26  Phos  4.0     03-19  Mg     1.9     03-19    TPro  6.6  /  Alb  3.7  /  TBili  3.0<H>  /  DBili  1.2<H>  /  AST  15  /  ALT  15  /  AlkPhos  66  03-19      PT/INR - ( 19 Mar 2018 04:26 )   PT: 35.9 sec;   INR: 3.16          PTT - ( 19 Mar 2018 13:22 )  PTT:99.1 sec          RADIOLOGY & ADDITIONAL STUDIES:

## 2018-03-19 NOTE — PROGRESS NOTE ADULT - ASSESSMENT
63 M with hx of MI, CHF EF(10-15%), DM type 2, HTN, CKD, Gout p/w septic shock and bilateral LL cellulitis, tachycardic, WBC 32, lactate 6. CT of LL did not revealed any gas/collections. Repeat CT 3/12 negative for nec fasc, Duplex US negative for DVT b/l, whole body gallium scan pending. WBC persistently elevated. Afebrile.       -Gallium scan demonstrating uptake in the LLE  -Patient refusing amputation  -No vascular surgery intervention at this time  -Continue care per primary team  -Vascular will sign off, please call [phone] for questions or acute changes

## 2018-03-19 NOTE — CONSULT NOTE ADULT - PROBLEM SELECTOR RECOMMENDATION 4
+ generalized pitting edema.  fluid removal via HD.
Patient with high aniongap metabolic acidosis due to GILMER/CKD and underlying respiratory alkalosis  Plan:  Consider add po sodium bicarboante tab 650mg po tid for now with monitoring K level  Goal bicarboante >22 at present.  Monitor BMP every 12 hrly for now.  Optimal oxygenatio
Suspect pre-renal in the setting of severe sepsis as well as heart failure. BUN/Cr 20.  -Check UA, Urine Lytes. Trend BUN and Cr.  -CT Abd/pelvis r/o obstruction.  -IV fluids  -Renal diet.  -Avoid nephrotoxins. Renally dose medications.

## 2018-03-19 NOTE — PROGRESS NOTE ADULT - SUBJECTIVE AND OBJECTIVE BOX
Patient was seen and evaluated on dialysis.   Patient is tolerating the procedure well.   HR: 94 (03-19-18 @ 16:00)  BP: 96/67  Continue dialysis:   Dialyzer:  180   QB:      400  QD: 500  Goal UF 1 liter over 3.5 Hours     Getting albumin with HD

## 2018-03-19 NOTE — PROGRESS NOTE ADULT - ASSESSMENT
63M PMH HFrEF 10-15% (ischemic), MI, s/p AICD vs PPM, possible Afib, HTN, DM2 on insulin, possible CKD, and gout, who presents with a chief complaint of generalized weakness now in septic shock likely 2/2 LE cellulitis and cardiogenic shock.    NEURO  #Toxic metabolic encephalopathy  - significant improvement in mentation compared to last week.   - Improving as sepsis and uremia resolves     CARDIOVASCULAR   # HYPOTENSION   - 2/2 shock, likely septic shock with component of cardiogenic shock in setting of HFrEF   - Pt persistently hypotensive this admission, now improved and off pressors and dobutamine  - F/u heart failure team recs  - Slowly downtrending WBC count, continue to monitor CBC  - Gallium scan ordered- prelim read with only LLE cellulutis   - Lactate downtrended to WNL   - CHF with severely reduced EF 15%, caution with fluids. Per renal recs, can give IV albumin for fluids.  - Switch to intermittent HD given improved BP    # CHF exacerbation  - CHF with EF 10-15% per pt 2/2 ischemic cardiomyopathy s/p AICD as indicated by CXR   - Elevated BNP on admission with R sided effusion and edema  - Cardiac shock major component of hypotension   - Increased pulm congestion noted on chest Xray. Will assess after dialysis today   - Starting On Hydralazine 5mg  q6h and Isordil 5mg tid    - f/u CHF recs   - Give fluids judiciously given low EF in setting of likely sepsis  - Unclear medical regimen opt. Per [facility] pharmacy ( and [place]) pt has not picked up Torsemide 20 or Metoprolol 100 since November.   - Hold ACE/Metoprolol in setting of sepsis  - CVO@ saturation improved after transfusion    #AFIB  - Unclear if hx of Afib. Per pharmacy pt on coumadin. Pt currently in Sinus rhythm however had episodes of A fib per records.   -d/c  Hep drip given concern of HIT. Started on Argatroban for anticoagulation. Bridge to coumadin.    - Pt s/p FFP (3/13,3/14) and Vit K (3/13) for thoracocentesis and HD catheter placement.        #CAD- Hx of MI s/p AICD vs PPI- Per pharmacy pt has not picked up Plavix since November  - Obtain collateral from outpatient cardiologist     # LE Edema   - LE edema with chronic venous stasis.   - Duplex does not show DVT    PULMONARY   #Acute resp failure   - pt with R loculated plural effusion noted to have increased work of breathing. Likely 2/2 fluid overload, Fluid studies consistent with exudative effusion.   - s/p thoracentesis on 3/13 with R chest tube placement. Chest tube removed 3/15.   - On 2L NC saturating well.   - Tapering down Solucortef to 50mf q12h. (Was on 50 q6h 3/12-3/17, 50q8 3/18). recent hx of Steroid administration 2 months ago  - Plan to keep Hb 9-10. s/p  pRBC transfusion on 3/12. 3/16  - Monitor resp status  - Appreciate CHF team recs regarding fluid status. Continue dialysis for fluid removal    # Possible Pneumothorax due to trapped lung   - Not seen on recent Xrays. Possibly skin fold   - Pt HD stable. Will monitor     #RENAL   #GILMER- 2/2 ischemic ATN  -Unknown baseline Cr presenting with Cr 3.93 with metabolic derangements.   - Likely 2/2 Sepsis, low intravascular volume and CHF  - Trend BUN and Cr.  - Renal team on board, HD catheter placed on 3/13 and replaced on 3/18. c/w  intermittent HD. renal team planning HD for today  - CT abdomen and pelvis without acute obstruction.   - retroperitoneal ultrasound showing medical renal disease.     #Hyperkalemia   - PT with elevated K to 5.7 on admission,  no EKG changes   - Continue telemetry monitoring  - Trend K   - Can given Kayexalate if persists   - c/w dialysis     #Metabolic acidosis   - 2/2 Lactate, starvation ketoacidosis and uremia   - Improved    #ID   - septic shock likely due to L LE cellulitis however continues to have persistently elevated WBCs not fully explained by steroids. Given worsening status, will obtain Gallium scan to look for other status.   - TTE with no vegetations. Pt not stable for RUKHSANA   - prelim Gallium scan read shows LLE cellulitis. , c/w zosyn Day 10. d/c Vanco (3/111-3/17)    - repeat LE CT does not show abscess or gas.   - R lung Effusion likely from overload. s/p thoracentesis with improvement in resp status   -  HIV negative  - Vascular surgery on board. Recommending Amputation as an option if pt continues to be unstable.       #GI   - pt wit Elevated Bilirubin and Alk phos. Abd exam benign  - CT abdomen/pelvis consistent with cholelithiasis and ascites.     #HEME    #Thrombocytopenia  - Intermediate probability of HIT, will dc Hep gtt and  switch to argobatran   - COuld also be related to sepsis and Vanc;/zosyn  - Monitor platelets   -PF4 atibodies positive. Pending ELVIRA results.      # elevated INR. Unclear etiology. Pt on coumadin?   - Switched to Argobatran given concern for HIT  - Given Vit K and FFP3/12. FFP 3/13   - Monitor coags    #Anemia  - Stable at 8-9. Anemia of chronic disease.  -s/p 1pRBC on 3/12, 3/16   - WIll keep Hb around 9-10     #ENDOCRINE   - Elevated Glucose. Anion gap elevated however likely 2/2 Uremia and Lactate. Ketones in urine likely 2/2 Starvation.   - S/p insulin drip  - Currently on MISS.   - Monitor blood glucose   - A1C 8.6%  - Increased Lantus to 14U yesterday. Monitor blood glucose and ISS coverage.     F: No IVF   E: Replete K<4 Mg<2, caution in setting of acute renal failure  N: Renal Diet     Lines:  R HD catheter (3/17). L IJ (3/12). R A line (3/12), barnes      Full code   Dispo: MICU  Ppx: Argatroban

## 2018-03-19 NOTE — CONSULT NOTE ADULT - PROBLEM SELECTOR RECOMMENDATION 6
per chart, pt wanted "everything done" in terms  of aggressive care and  life support.  Need to clarify this in light of 2nd organ failure and pts likely shortened life expectancy and high risk for mortality.
Patient's calcium 8.3 with phosphorus 3.5 at present.  Obtain Intact PTH, Vitamin D 25 and VItamin D 1,25 level.  Low K/Low phos/renal diet  Monitor Calcium and phosphorus level for now.
Alkaline phosphatase and bilirubin elevated. Normal transaminases.  -Check fractionated bilirubin. Trend.  -CT Abd/Pelvis.

## 2018-03-19 NOTE — CONSULT NOTE ADULT - PROBLEM SELECTOR RECOMMENDATION 9
Pt currently bedbound.  Pre-hospital, pt was ambulatory with a walker for sort distances within his apartment (a studio).  Pt used a wheelchair and Access-A-Ride to get to MD appointments.

## 2018-03-19 NOTE — PROGRESS NOTE ADULT - ASSESSMENT
64 y/o male with PMH of HFrEF 10-15% (ischemic), MI, s/p AICD , possible Afib, HTN, DM2 on insulin, possible CKD, and gout, who presents with a chief complaint of generalized weakness found to have septic shock 2/2 to cellulitis.  Patient was hypotensive since admission was on levophed and now on dobutamine. Cardiology was consulted for persistent hypotension and low mixed venous sat with concern for cardiogenic shock    Heart failure:  -Patient clinically improved , warm on exam and has better mixed o2 saturation but CVP is still 19  -dobutamine was discontinued   -Agree with afterload reduction with hydralazine and nitrate as BP tolerates  -Better optimal volume load as CVP is still 19 per nephrology plan      Atrial fibrillation:  -patient is now in NSR   -d/c  Hep drip given concern of HIT. Started on Argatroban for anticoagulation.  -f/u platelet count

## 2018-03-19 NOTE — CONSULT NOTE ADULT - PROBLEM SELECTOR RECOMMENDATION 3
history of same. Pt s/p ICD placement. Pt with low EF-- 15%  Pt remains at very high risk for mortality 2/2 his weak heart, alex in the setting of 2nd organ failure (renal)

## 2018-03-19 NOTE — PROGRESS NOTE ADULT - ATTENDING COMMENTS
Patient seen and examined with house-staff during bedside rounds.  Resident note read, including vitals, physical findings, laboratory data, and radiological reports.   Revisions included below.  Direct personal management at bed side and extensive interpretation of the data.  Plan was outlined and discussed in details with the housestaff.  Decision making of high complexity  Action taken for acute disease activity to reflect the level of care provided:  - medication reconciliation  - review laboratory data  - off pressors and ionotrops  - was started on Imdur and hydralazine  - for fluid removal with HD today  - In argatroban and awaiting serotonin assay  - on zosyn and off Vanco  - Awaiting gallium scan official result  - CHF consult  - MS improved  - Decrease hydrocortisone  - palliative input

## 2018-03-19 NOTE — PROGRESS NOTE ADULT - ASSESSMENT
62 yo M history of HFrEF 10-15% 2/2 ischemic cardiomyopathy, MI , s/p AICD vs PPM, ?Afib, Hypertension, Diabetes Mellitus Type 2 on insulin, CKD?and gout, who presents with a chief complaint of generalized weakness. Now treated for septic shock - off pressors and CHF exacerbation - still oligunaric, no urine output. Started initially on CVVHD and last week on Friday switch on IHD. Last Session done on 3/17 - 1.5 liters. We will do HD today with more UF as he is able to tolerate

## 2018-03-19 NOTE — CONSULT NOTE ADULT - PROBLEM SELECTOR RECOMMENDATION 5
Support provided to patient and family. Patient to have access to supportive services during rest of hospital stay as the patient/family deemed necessary ie. Chaplaincy, Massage therapy, Music therapy, Patient and family supportive services, Palliative SW, etc.  Pts dgt will be coming to hospital later today.   Pall Care will meet pt and his dgt.
Anemia of chronic renal disease with hemoglobin 9.6 at present.  Obtain Iron studies  Will consider EPO once more information available if indicated.  Transfuse PRBC per primary team as indicated.
Hyperglycemia to 300s likely 2/2 non-adherence with insulin and severe sepsis. Although there is ketonuria, do not suspect DKA. Ketonuria more likely represents starvation ketosis given history.  -Insulin gtt, hyperglycemia protocol.  -Check HbA1c.

## 2018-03-19 NOTE — CONSULT NOTE ADULT - PROBLEM SELECTOR PROBLEM 4
Metabolic acidosis, increased anion gap
Generalized edema
Acute renal failure, unspecified acute renal failure type

## 2018-03-19 NOTE — PROGRESS NOTE ADULT - SUBJECTIVE AND OBJECTIVE BOX
INTERVAL HPI/ OVERNIGHT EVENTS: Pt seen and examined at bedside. Pt able to hold full conversation today. Pt lying comfortably in bed at time of visit. Pt states that he has no pain in his legs. Pt denies n/v/f/c.    piperacillin/tazobactam IVPB. 2.25  argatroban Infusion 0.094  hydrALAZINE Injectable 5  isosorbide   dinitrate Tablet (ISORDIL) 5  piperacillin/tazobactam IVPB. 2.25      Allergies    No Known Allergies    Intolerances        Vital Signs Last 24 Hrs  T(C): 36.3 (19 Mar 2018 09:04), Max: 36.4 (18 Mar 2018 10:00)  T(F): 97.3 (19 Mar 2018 09:04), Max: 97.5 (18 Mar 2018 10:00)  HR: 88 (19 Mar 2018 08:00) (82 - 96)  BP: --  BP(mean): --  RR: 24 (19 Mar 2018 08:00) (12 - 29)  SpO2: 100% (19 Mar 2018 08:00) (96% - 100%)  I&O's Summary    18 Mar 2018 07:01  -  19 Mar 2018 07:00  --------------------------------------------------------  IN: 510.4 mL / OUT: 0 mL / NET: 510.4 mL    19 Mar 2018 07:01  -  19 Mar 2018 09:57  --------------------------------------------------------  IN: 0.5 mL / OUT: 0 mL / NET: 0.5 mL        Physical Exam:  General: NAD, resting comfortably in bed  Pulmonary: non labored breathing, no respiratory distress   Extremities: Wound on dorsum of right foot reepithelialized, no drainage. Erythema and edema of b/l LE significantly improved, +1 pitting edema of b/l LE to the tibial tuberosity, no drainage, no purulence         LABS:                        8.7    18.7  )-----------( 77       ( 19 Mar 2018 04:26 )             27.7     03-19    137  |  100  |  54<H>  ----------------------------<  215<H>  3.6   |  21<L>  |  3.24<H>    Ca    8.7      19 Mar 2018 04:26  Phos  4.0     03-19  Mg     1.9     03-19    TPro  6.6  /  Alb  3.7  /  TBili  3.0<H>  /  DBili  1.2<H>  /  AST  15  /  ALT  15  /  AlkPhos  66  03-19    PT/INR - ( 19 Mar 2018 04:26 )   PT: 35.9 sec;   INR: 3.16          PTT - ( 19 Mar 2018 04:26 )  PTT:106.1 sec    Radiology and Additional Studies: n/a

## 2018-03-19 NOTE — PROGRESS NOTE ADULT - PROBLEM SELECTOR PLAN 4
Anemia of chronic renal disease with hemoglobin 7.4  at present.   Ferritin: 1377 and T sat% 34.  No EPO or iron at present due to probable GILMER vs CKD.  Monitor hemoglobin for now  Transfuse PRBC per primary team

## 2018-03-20 DIAGNOSIS — L03.119 CELLULITIS OF UNSPECIFIED PART OF LIMB: ICD-10-CM

## 2018-03-20 DIAGNOSIS — F43.21 ADJUSTMENT DISORDER WITH DEPRESSED MOOD: ICD-10-CM

## 2018-03-20 LAB
% ALBUMIN: 56.4 % — SIGNIFICANT CHANGE UP
% ALPHA 1: 4.8 % — SIGNIFICANT CHANGE UP
% ALPHA 2: 5 % — SIGNIFICANT CHANGE UP
% BETA: 8.6 % — SIGNIFICANT CHANGE UP
% GAMMA: 25.2 % — SIGNIFICANT CHANGE UP
ALBUMIN SERPL ELPH-MCNC: 3.4 G/DL — LOW (ref 3.6–5.5)
ALBUMIN/GLOB SERPL ELPH: 1.3 RATIO — SIGNIFICANT CHANGE UP
ALPHA1 GLOB SERPL ELPH-MCNC: 0.3 G/DL — SIGNIFICANT CHANGE UP (ref 0.1–0.4)
ALPHA2 GLOB SERPL ELPH-MCNC: 0.3 G/DL — LOW (ref 0.5–1)
ANION GAP SERPL CALC-SCNC: 13 MMOL/L — SIGNIFICANT CHANGE UP (ref 5–17)
ANION GAP SERPL CALC-SCNC: 14 MMOL/L — SIGNIFICANT CHANGE UP (ref 5–17)
APTT BLD: 85.6 SEC — HIGH (ref 27.5–37.4)
B-GLOBULIN SERPL ELPH-MCNC: 0.5 G/DL — SIGNIFICANT CHANGE UP (ref 0.5–1)
BUN SERPL-MCNC: 45 MG/DL — HIGH (ref 7–23)
BUN SERPL-MCNC: 51 MG/DL — HIGH (ref 7–23)
CALCIUM SERPL-MCNC: 8.9 MG/DL — SIGNIFICANT CHANGE UP (ref 8.4–10.5)
CALCIUM SERPL-MCNC: 9.2 MG/DL — SIGNIFICANT CHANGE UP (ref 8.4–10.5)
CHLORIDE SERPL-SCNC: 100 MMOL/L — SIGNIFICANT CHANGE UP (ref 96–108)
CHLORIDE SERPL-SCNC: 100 MMOL/L — SIGNIFICANT CHANGE UP (ref 96–108)
CO2 SERPL-SCNC: 25 MMOL/L — SIGNIFICANT CHANGE UP (ref 22–31)
CO2 SERPL-SCNC: 26 MMOL/L — SIGNIFICANT CHANGE UP (ref 22–31)
CREAT SERPL-MCNC: 3 MG/DL — HIGH (ref 0.5–1.3)
CREAT SERPL-MCNC: 3.48 MG/DL — HIGH (ref 0.5–1.3)
GAMMA GLOBULIN: 1.5 G/DL — SIGNIFICANT CHANGE UP (ref 0.6–1.6)
GLUCOSE BLDC GLUCOMTR-MCNC: 119 MG/DL — HIGH (ref 70–99)
GLUCOSE BLDC GLUCOMTR-MCNC: 121 MG/DL — HIGH (ref 70–99)
GLUCOSE BLDC GLUCOMTR-MCNC: 134 MG/DL — HIGH (ref 70–99)
GLUCOSE BLDC GLUCOMTR-MCNC: 150 MG/DL — HIGH (ref 70–99)
GLUCOSE SERPL-MCNC: 132 MG/DL — HIGH (ref 70–99)
GLUCOSE SERPL-MCNC: 140 MG/DL — HIGH (ref 70–99)
HCT VFR BLD CALC: 27.9 % — LOW (ref 39–50)
HGB BLD-MCNC: 8.9 G/DL — LOW (ref 13–17)
INR BLD: 2.42 — HIGH (ref 0.88–1.16)
MAGNESIUM SERPL-MCNC: 2 MG/DL — SIGNIFICANT CHANGE UP (ref 1.6–2.6)
MCHC RBC-ENTMCNC: 30.1 PG — SIGNIFICANT CHANGE UP (ref 27–34)
MCHC RBC-ENTMCNC: 31.9 G/DL — LOW (ref 32–36)
MCV RBC AUTO: 94.3 FL — SIGNIFICANT CHANGE UP (ref 80–100)
PHOSPHATE SERPL-MCNC: 4 MG/DL — SIGNIFICANT CHANGE UP (ref 2.5–4.5)
PLATELET # BLD AUTO: 89 K/UL — LOW (ref 150–400)
POTASSIUM SERPL-MCNC: 3.9 MMOL/L — SIGNIFICANT CHANGE UP (ref 3.5–5.3)
POTASSIUM SERPL-MCNC: 4 MMOL/L — SIGNIFICANT CHANGE UP (ref 3.5–5.3)
POTASSIUM SERPL-SCNC: 3.9 MMOL/L — SIGNIFICANT CHANGE UP (ref 3.5–5.3)
POTASSIUM SERPL-SCNC: 4 MMOL/L — SIGNIFICANT CHANGE UP (ref 3.5–5.3)
PROTHROM AB SERPL-ACNC: 27.4 SEC — HIGH (ref 9.8–12.7)
RBC # BLD: 2.96 M/UL — LOW (ref 4.2–5.8)
RBC # FLD: 18.2 % — HIGH (ref 10.3–16.9)
SODIUM SERPL-SCNC: 139 MMOL/L — SIGNIFICANT CHANGE UP (ref 135–145)
SODIUM SERPL-SCNC: 139 MMOL/L — SIGNIFICANT CHANGE UP (ref 135–145)
SRA INTERP SER-IMP: SIGNIFICANT CHANGE UP
WBC # BLD: 17.4 K/UL — HIGH (ref 3.8–10.5)
WBC # FLD AUTO: 17.4 K/UL — HIGH (ref 3.8–10.5)

## 2018-03-20 PROCEDURE — 71045 X-RAY EXAM CHEST 1 VIEW: CPT | Mod: 26

## 2018-03-20 PROCEDURE — 99233 SBSQ HOSP IP/OBS HIGH 50: CPT

## 2018-03-20 PROCEDURE — 99233 SBSQ HOSP IP/OBS HIGH 50: CPT | Mod: GC

## 2018-03-20 PROCEDURE — 99232 SBSQ HOSP IP/OBS MODERATE 35: CPT

## 2018-03-20 PROCEDURE — 36569 INSJ PICC 5 YR+ W/O IMAGING: CPT

## 2018-03-20 PROCEDURE — 99223 1ST HOSP IP/OBS HIGH 75: CPT | Mod: GC

## 2018-03-20 PROCEDURE — 76937 US GUIDE VASCULAR ACCESS: CPT | Mod: 26

## 2018-03-20 PROCEDURE — 99231 SBSQ HOSP IP/OBS SF/LOW 25: CPT

## 2018-03-20 RX ORDER — SODIUM CHLORIDE 9 MG/ML
20 INJECTION INTRAMUSCULAR; INTRAVENOUS; SUBCUTANEOUS ONCE
Qty: 0 | Refills: 0 | Status: DISCONTINUED | OUTPATIENT
Start: 2018-03-20 | End: 2018-04-09

## 2018-03-20 RX ORDER — ALBUMIN HUMAN 25 %
VIAL (ML) INTRAVENOUS
Qty: 0 | Refills: 0 | Status: COMPLETED | OUTPATIENT
Start: 2018-03-20 | End: 2018-03-20

## 2018-03-20 RX ORDER — MIDODRINE HYDROCHLORIDE 2.5 MG/1
5 TABLET ORAL EVERY 8 HOURS
Qty: 0 | Refills: 0 | Status: DISCONTINUED | OUTPATIENT
Start: 2018-03-20 | End: 2018-03-20

## 2018-03-20 RX ORDER — CHLORHEXIDINE GLUCONATE 213 G/1000ML
1 SOLUTION TOPICAL DAILY
Qty: 0 | Refills: 0 | Status: DISCONTINUED | OUTPATIENT
Start: 2018-03-20 | End: 2018-05-22

## 2018-03-20 RX ORDER — LANOLIN ALCOHOL/MO/W.PET/CERES
3 CREAM (GRAM) TOPICAL AT BEDTIME
Qty: 0 | Refills: 0 | Status: DISCONTINUED | OUTPATIENT
Start: 2018-03-20 | End: 2018-03-23

## 2018-03-20 RX ORDER — ASPIRIN/CALCIUM CARB/MAGNESIUM 324 MG
81 TABLET ORAL DAILY
Qty: 0 | Refills: 0 | Status: DISCONTINUED | OUTPATIENT
Start: 2018-03-20 | End: 2018-04-01

## 2018-03-20 RX ORDER — ALBUMIN HUMAN 25 %
50 VIAL (ML) INTRAVENOUS ONCE
Qty: 0 | Refills: 0 | Status: COMPLETED | OUTPATIENT
Start: 2018-03-20 | End: 2018-03-20

## 2018-03-20 RX ORDER — SODIUM CHLORIDE 9 MG/ML
10 INJECTION INTRAMUSCULAR; INTRAVENOUS; SUBCUTANEOUS EVERY 12 HOURS
Qty: 0 | Refills: 0 | Status: DISCONTINUED | OUTPATIENT
Start: 2018-03-20 | End: 2018-04-09

## 2018-03-20 RX ORDER — MIDODRINE HYDROCHLORIDE 2.5 MG/1
5 TABLET ORAL EVERY 8 HOURS
Qty: 0 | Refills: 0 | Status: DISCONTINUED | OUTPATIENT
Start: 2018-03-20 | End: 2018-03-21

## 2018-03-20 RX ORDER — WARFARIN SODIUM 2.5 MG/1
2.5 TABLET ORAL ONCE
Qty: 0 | Refills: 0 | Status: COMPLETED | OUTPATIENT
Start: 2018-03-20 | End: 2018-03-20

## 2018-03-20 RX ORDER — ATORVASTATIN CALCIUM 80 MG/1
80 TABLET, FILM COATED ORAL AT BEDTIME
Qty: 0 | Refills: 0 | Status: DISCONTINUED | OUTPATIENT
Start: 2018-03-20 | End: 2018-03-30

## 2018-03-20 RX ORDER — SODIUM CHLORIDE 9 MG/ML
10 INJECTION INTRAMUSCULAR; INTRAVENOUS; SUBCUTANEOUS
Qty: 0 | Refills: 0 | Status: DISCONTINUED | OUTPATIENT
Start: 2018-03-20 | End: 2018-04-09

## 2018-03-20 RX ADMIN — Medication 1 MILLIGRAM(S): at 13:05

## 2018-03-20 RX ADMIN — Medication 50 MILLILITER(S): at 07:55

## 2018-03-20 RX ADMIN — WARFARIN SODIUM 2.5 MILLIGRAM(S): 2.5 TABLET ORAL at 22:50

## 2018-03-20 RX ADMIN — MIDODRINE HYDROCHLORIDE 5 MILLIGRAM(S): 2.5 TABLET ORAL at 13:05

## 2018-03-20 RX ADMIN — ESCITALOPRAM OXALATE 5 MILLIGRAM(S): 10 TABLET, FILM COATED ORAL at 13:05

## 2018-03-20 RX ADMIN — ISOSORBIDE DINITRATE 5 MILLIGRAM(S): 5 TABLET ORAL at 13:08

## 2018-03-20 RX ADMIN — Medication 81 MILLIGRAM(S): at 13:05

## 2018-03-20 RX ADMIN — Medication 3 MILLIGRAM(S): at 22:51

## 2018-03-20 RX ADMIN — Medication 1 TABLET(S): at 13:05

## 2018-03-20 RX ADMIN — Medication 50 MILLILITER(S): at 14:40

## 2018-03-20 RX ADMIN — PIPERACILLIN AND TAZOBACTAM 200 GRAM(S): 4; .5 INJECTION, POWDER, LYOPHILIZED, FOR SOLUTION INTRAVENOUS at 13:50

## 2018-03-20 RX ADMIN — INSULIN GLARGINE 22 UNIT(S): 100 INJECTION, SOLUTION SUBCUTANEOUS at 22:50

## 2018-03-20 RX ADMIN — Medication 50 MILLIGRAM(S): at 06:54

## 2018-03-20 RX ADMIN — MIDODRINE HYDROCHLORIDE 5 MILLIGRAM(S): 2.5 TABLET ORAL at 22:49

## 2018-03-20 RX ADMIN — Medication 100 MILLIGRAM(S): at 13:05

## 2018-03-20 RX ADMIN — ATORVASTATIN CALCIUM 80 MILLIGRAM(S): 80 TABLET, FILM COATED ORAL at 22:49

## 2018-03-20 RX ADMIN — PIPERACILLIN AND TAZOBACTAM 200 GRAM(S): 4; .5 INJECTION, POWDER, LYOPHILIZED, FOR SOLUTION INTRAVENOUS at 06:54

## 2018-03-20 RX ADMIN — Medication 50 MILLIGRAM(S): at 19:32

## 2018-03-20 RX ADMIN — PIPERACILLIN AND TAZOBACTAM 200 GRAM(S): 4; .5 INJECTION, POWDER, LYOPHILIZED, FOR SOLUTION INTRAVENOUS at 22:49

## 2018-03-20 RX ADMIN — Medication 50 MILLILITER(S): at 00:42

## 2018-03-20 RX ADMIN — ISOSORBIDE DINITRATE 5 MILLIGRAM(S): 5 TABLET ORAL at 23:04

## 2018-03-20 NOTE — PROGRESS NOTE ADULT - SUBJECTIVE AND OBJECTIVE BOX
INTERVAL HPI/OVERNIGHT EVENTS: Dialysis stopped at 1L given hypotension. AM BP meds held. df     SUBJECTIVE: Patient seen and examined at bedside. Continues to feel weak.     OBJECTIVE:    VITAL SIGNS:  ICU Vital Signs Last 24 Hrs  T(C): 35.6 (20 Mar 2018 10:34), Max: 36.6 (19 Mar 2018 18:17)  T(F): 96 (20 Mar 2018 10:34), Max: 97.8 (19 Mar 2018 18:17)  HR: 84 (20 Mar 2018 11:00) (84 - 110)  BP: 85/69 (20 Mar 2018 11:00) (76/65 - 89/75)  BP(mean): 76 (20 Mar 2018 11:00) (69 - 80)  ABP: 90/60 (19 Mar 2018 20:00) (82/54 - 96/66)  ABP(mean): 70 (19 Mar 2018 20:00) (64 - 76)  RR: 15 (20 Mar 2018 11:00) (14 - 26)  SpO2: 100% (20 Mar 2018 11:00) (89% - 100%)        03-19 @ 07:01  -  03-20 @ 07:00  --------------------------------------------------------  IN: 503.8 mL / OUT: 1000 mL / NET: -496.2 mL      CAPILLARY BLOOD GLUCOSE      POCT Blood Glucose.: 121 mg/dL (20 Mar 2018 11:16)      PHYSICAL EXAM:    HEENT: NC/AT, PERRL  Neck: supple L IJ in place. R HD cath   Respiratory: cta b/l   Cardiac: +S1/S2, ii/vi systolic murmur   Gastrointestinal: Soft nd/nt, no rebound or guarding. +BS  Genitourinary: Testicular edema   Extremities: 1+ edema to knees. warm to touch.  b/l areas of venous stasis. RLE with dry dressing  Skin: no rash  MSK: no joint swelling  Vasc: 1+ DP pulses      MEDICATIONS:  MEDICATIONS  (STANDING):  albumin human 25% IVPB 50 milliLiter(s) IV Intermittent every 1 hour  aspirin  chewable 81 milliGRAM(s) Oral daily  bisacodyl 5 milliGRAM(s) Oral daily  dextrose 5%. 1000 milliLiter(s) (50 mL/Hr) IV Continuous <Continuous>  dextrose 50% Injectable 12.5 Gram(s) IV Push once  dextrose 50% Injectable 25 Gram(s) IV Push once  dextrose 50% Injectable 25 Gram(s) IV Push once  escitalopram 5 milliGRAM(s) Oral daily  folic acid 1 milliGRAM(s) Oral daily  hydrALAZINE Injectable 5 milliGRAM(s) IV Push every 6 hours  hydrocortisone sodium succinate Injectable 50 milliGRAM(s) IV Push every 12 hours  insulin glargine Injectable (LANTUS) 22 Unit(s) SubCutaneous at bedtime  insulin lispro (HumaLOG) corrective regimen sliding scale   SubCutaneous Before meals and at bedtime  isosorbide   dinitrate Tablet (ISORDIL) 5 milliGRAM(s) Oral three times a day  midodrine 5 milliGRAM(s) Oral every 8 hours  multivitamin 1 Tablet(s) Oral daily  piperacillin/tazobactam IVPB. 2.25 Gram(s) IV Intermittent every 8 hours  thiamine 100 milliGRAM(s) Oral daily  warfarin 2.5 milliGRAM(s) Oral once    MEDICATIONS  (PRN):  acetaminophen   Tablet. 650 milliGRAM(s) Oral every 6 hours PRN Moderate Pain (4 - 6)  dextrose Gel 1 Dose(s) Oral once PRN Blood Glucose LESS THAN 70 milliGRAM(s)/deciliter  glucagon  Injectable 1 milliGRAM(s) IntraMuscular once PRN Glucose LESS THAN 70 milligrams/deciliter      ALLERGIES:  Allergies    heparin (Unknown)    Intolerances        LABS:                        8.9    17.4  )-----------( 89       ( 20 Mar 2018 06:22 )             27.9     03-20    139  |  100  |  45<H>  ----------------------------<  140<H>  3.9   |  26  |  3.00<H>    Ca    9.2      20 Mar 2018 06:22  Phos  4.0     03-20  Mg     2.0     03-20    TPro  6.1  /  Alb  x   /  TBili  x   /  DBili  x   /  AST  x   /  ALT  x   /  AlkPhos  x   03-19    PT/INR - ( 20 Mar 2018 06:22 )   PT: 27.4 sec;   INR: 2.42          PTT - ( 20 Mar 2018 06:22 )  PTT:85.6 sec      RADIOLOGY & ADDITIONAL TESTS: Reviewed.

## 2018-03-20 NOTE — CHART NOTE - NSCHARTNOTEFT_GEN_A_CORE
Admitting Diagnosis:   63M PMH HFrEF 10-15% (ischemic), MI, s/p AICD vs PPM, possible Afib, HTN, DM2 on insulin, possible CKD, and gout, who presents with a chief complaint of generalized weakness now in septic shock likely 2/2 LE cellulitis    PAST MEDICAL & SURGICAL HISTORY:  Type 2 diabetes mellitus with diabetic peripheral angiopathy without gangrene, with long-term current  Essential hypertension, benign  Gout  Pacemaker  Chronic systolic heart failure  Myocardial infarction  No significant past surgical history      Current Nutrition Order:  Consistent Carbohydrate Diet w/snack  Ensure Enlive BID (700 kcal, 40g protein, 360 mL free H2O)   Ensure Pudding BID (340 kcal, 8g protein)       PO Intake: Good (%) [   ]  Fair (50-75%) [ X  ] Poor (<25%) [   ]    GI Issues: No N/V/C/D reported at this time. Last BM 3/20    Pain: Pain endorsed at this time, RN aware     Skin Integrity:    Labs:       139  |  100  |  45<H>  ----------------------------<  140<H>  3.9   |  26  |  3.00<H>    Ca    9.2      20 Mar 2018 06:22  Phos  4.0       Mg     2.0         TPro  6.1  /  Alb  x   /  TBili  x   /  DBili  x   /  AST  x   /  ALT  x   /  AlkPhos  x   -    CAPILLARY BLOOD GLUCOSE      POCT Blood Glucose.: 121 mg/dL (20 Mar 2018 11:16)  POCT Blood Glucose.: 150 mg/dL (20 Mar 2018 06:11)  POCT Blood Glucose.: 195 mg/dL (19 Mar 2018 21:24)  POCT Blood Glucose.: 178 mg/dL (19 Mar 2018 16:17)      Medications:  MEDICATIONS  (STANDING):  albumin human 25% IVPB      albumin human 25% IVPB 50 milliLiter(s) IV Intermittent once  albumin human 25% IVPB 50 milliLiter(s) IV Intermittent once  albumin human 25% IVPB 50 milliLiter(s) IV Intermittent every 1 hour  aspirin  chewable 81 milliGRAM(s) Oral daily  bisacodyl 5 milliGRAM(s) Oral daily  chlorhexidine 2% Cloths 1 Application(s) Topical daily  dextrose 5%. 1000 milliLiter(s) (50 mL/Hr) IV Continuous <Continuous>  dextrose 50% Injectable 12.5 Gram(s) IV Push once  dextrose 50% Injectable 25 Gram(s) IV Push once  dextrose 50% Injectable 25 Gram(s) IV Push once  escitalopram 5 milliGRAM(s) Oral daily  folic acid 1 milliGRAM(s) Oral daily  hydrALAZINE Injectable 5 milliGRAM(s) IV Push every 6 hours  hydrocortisone sodium succinate Injectable 50 milliGRAM(s) IV Push every 12 hours  insulin glargine Injectable (LANTUS) 22 Unit(s) SubCutaneous at bedtime  insulin lispro (HumaLOG) corrective regimen sliding scale   SubCutaneous Before meals and at bedtime  isosorbide   dinitrate Tablet (ISORDIL) 5 milliGRAM(s) Oral three times a day  midodrine 5 milliGRAM(s) Oral every 8 hours  multivitamin 1 Tablet(s) Oral daily  piperacillin/tazobactam IVPB. 2.25 Gram(s) IV Intermittent every 8 hours  sodium chloride 0.9% lock flush 20 milliLiter(s) IV Push once  thiamine 100 milliGRAM(s) Oral daily  warfarin 2.5 milliGRAM(s) Oral once    MEDICATIONS  (PRN):  acetaminophen   Tablet. 650 milliGRAM(s) Oral every 6 hours PRN Moderate Pain (4 - 6)  dextrose Gel 1 Dose(s) Oral once PRN Blood Glucose LESS THAN 70 milliGRAM(s)/deciliter  glucagon  Injectable 1 milliGRAM(s) IntraMuscular once PRN Glucose LESS THAN 70 milligrams/deciliter  sodium chloride 0.9% lock flush 10 milliLiter(s) IV Push every 1 hour PRN After each medication administration  sodium chloride 0.9% lock flush 10 milliLiter(s) IV Push every 12 hours PRN Lumen of catheter NOT used      Weight:  Daily     Daily Weight in k.9 (19 Mar 2018 18:05)    Weight Change: 9kg weight loss since admit    Estimated energy needs using 80 kg ABW: Needs estimated 2/2 age, critical illness, HD   Calories: 30-35 kcal/kg = 3526-4136 kcal/day  Protein: 1.2-1.4 g/kg =  g protein/day  Fluids 500 mL + UOP 2/2 HD    Subjective: 62 yo/male seen in room. Calorie count ordered 3 days ago, but never continued after first day per current RN. Pt and RN both endorse increased/improving appetite. Endorses intake of eggs and fruit at breakfast, and ordered grilled cheese for lunch. Per RN, was drinking Ensure but had loose stool yesterday so does not want anymore. No N/V/C/D reported at this time. BM 3/20. Plan for PICC line placement today. Per MD note, likely will need HD weekly. Reviewed need for protein intake, pt w/good understanding.      Previous Nutrition Diagnosis:  Inadequate oral intake RT fair appetite AEB 50-75% PO intake at present    Active [  X ]  Resolved [   ]    Goal: Meet % of nutrition needs via tolerated route    Recommendations:  1. Encourage PO intake; enforce high protein intake and honor pt's food preferences  2. Trend daily weights  3. Ensure pain control     Education: reviewed protein options     Risk Level: High [  X ] Moderate [   ] Low [   ] Admitting Diagnosis:   63M PMH HFrEF 10-15% (ischemic), MI, s/p AICD vs PPM, possible Afib, HTN, DM2 on insulin, possible CKD, and gout, who presents with a chief complaint of generalized weakness now in septic shock likely 2/2 LE cellulitis    PAST MEDICAL & SURGICAL HISTORY:  Type 2 diabetes mellitus with diabetic peripheral angiopathy without gangrene, with long-term current  Essential hypertension, benign  Gout  Pacemaker  Chronic systolic heart failure  Myocardial infarction  No significant past surgical history      Current Nutrition Order:  Consistent Carbohydrate Diet w/snack  Ensure Enlive BID (700 kcal, 40g protein, 360 mL free H2O)   Ensure Pudding BID (340 kcal, 8g protein)       PO Intake: Good (%) [   ]  Fair (50-75%) [ X  ] Poor (<25%) [   ]    GI Issues: No N/V/C/D reported at this time. Last BM 3/20    Pain: Pain endorsed at this time, RN aware     Skin Integrity:    Labs:       139  |  100  |  45<H>  ----------------------------<  140<H>  3.9   |  26  |  3.00<H>    Ca    9.2      20 Mar 2018 06:22  Phos  4.0       Mg     2.0         TPro  6.1  /  Alb  x   /  TBili  x   /  DBili  x   /  AST  x   /  ALT  x   /  AlkPhos  x   -    CAPILLARY BLOOD GLUCOSE      POCT Blood Glucose.: 121 mg/dL (20 Mar 2018 11:16)  POCT Blood Glucose.: 150 mg/dL (20 Mar 2018 06:11)  POCT Blood Glucose.: 195 mg/dL (19 Mar 2018 21:24)  POCT Blood Glucose.: 178 mg/dL (19 Mar 2018 16:17)      Medications:  MEDICATIONS  (STANDING):  albumin human 25% IVPB      albumin human 25% IVPB 50 milliLiter(s) IV Intermittent once  albumin human 25% IVPB 50 milliLiter(s) IV Intermittent once  albumin human 25% IVPB 50 milliLiter(s) IV Intermittent every 1 hour  aspirin  chewable 81 milliGRAM(s) Oral daily  bisacodyl 5 milliGRAM(s) Oral daily  chlorhexidine 2% Cloths 1 Application(s) Topical daily  dextrose 5%. 1000 milliLiter(s) (50 mL/Hr) IV Continuous <Continuous>  dextrose 50% Injectable 12.5 Gram(s) IV Push once  dextrose 50% Injectable 25 Gram(s) IV Push once  dextrose 50% Injectable 25 Gram(s) IV Push once  escitalopram 5 milliGRAM(s) Oral daily  folic acid 1 milliGRAM(s) Oral daily  hydrALAZINE Injectable 5 milliGRAM(s) IV Push every 6 hours  hydrocortisone sodium succinate Injectable 50 milliGRAM(s) IV Push every 12 hours  insulin glargine Injectable (LANTUS) 22 Unit(s) SubCutaneous at bedtime  insulin lispro (HumaLOG) corrective regimen sliding scale   SubCutaneous Before meals and at bedtime  isosorbide   dinitrate Tablet (ISORDIL) 5 milliGRAM(s) Oral three times a day  midodrine 5 milliGRAM(s) Oral every 8 hours  multivitamin 1 Tablet(s) Oral daily  piperacillin/tazobactam IVPB. 2.25 Gram(s) IV Intermittent every 8 hours  sodium chloride 0.9% lock flush 20 milliLiter(s) IV Push once  thiamine 100 milliGRAM(s) Oral daily  warfarin 2.5 milliGRAM(s) Oral once    MEDICATIONS  (PRN):  acetaminophen   Tablet. 650 milliGRAM(s) Oral every 6 hours PRN Moderate Pain (4 - 6)  dextrose Gel 1 Dose(s) Oral once PRN Blood Glucose LESS THAN 70 milliGRAM(s)/deciliter  glucagon  Injectable 1 milliGRAM(s) IntraMuscular once PRN Glucose LESS THAN 70 milligrams/deciliter  sodium chloride 0.9% lock flush 10 milliLiter(s) IV Push every 1 hour PRN After each medication administration  sodium chloride 0.9% lock flush 10 milliLiter(s) IV Push every 12 hours PRN Lumen of catheter NOT used      Weight:  Daily     Daily Weight in k.9 (19 Mar 2018 18:05)    Weight Change: 9kg weight loss since admit    Estimated energy needs using 80 kg ABW: Needs estimated 2/2 age, critical illness, HD   Calories: 30-35 kcal/kg = 5108-0197 kcal/day  Protein: 1.2-1.4 g/kg =  g protein/day  Fluids 500 mL + UOP 2/2 HD    Subjective: 64 yo/male seen in room. Calorie count ordered 3 days ago, but never continued after first day per current RN. Pt and RN both endorse increased/improving appetite. Endorses intake of eggs and fruit at breakfast, and ordered grilled cheese for lunch. Per RN, was drinking Ensure but had loose stool yesterday so does not want anymore. No N/V/C/D reported at this time. BM 3/20. Plan for PICC line placement today. Per MD note, likely will need HD weekly. Psych saw pt and deemed pt not to have decision-making capacity; also seen by Palliative on 3/19 to establish goals of care.     Previous Nutrition Diagnosis:  Inadequate oral intake RT fair appetite AEB 50-75% PO intake at present    Active [  X ]  Resolved [   ]    Goal: Meet % of nutrition needs via tolerated route    Recommendations:  1. Encourage PO intake; enforce high protein intake and honor pt's food preferences  2. Trend daily weights  3. Ensure pain control     Education: reviewed high protein options; good understanding     Risk Level: High [  X ] Moderate [   ] Low [   ]

## 2018-03-20 NOTE — PROGRESS NOTE ADULT - SUBJECTIVE AND OBJECTIVE BOX
the patient seen in the afternoon in his bed, eating food from outside again ([employer] and without soda this time), no shortness of breath, no chest pain, anuric. Last HD 3/19 - 1 liter.  The renal service follows the case for GILMER in the setting of septic shocked (improved) and CHF exacerbation. Still oliguric from ATN        Patient seen and examined at bedside.     acetaminophen   Tablet. 650 milliGRAM(s) every 6 hours PRN  albumin human 25% IVPB     albumin human 25% IVPB 50 milliLiter(s) once  albumin human 25% IVPB 50 milliLiter(s) once  albumin human 25% IVPB 50 milliLiter(s) every 1 hour  aspirin  chewable 81 milliGRAM(s) daily  bisacodyl 5 milliGRAM(s) daily  chlorhexidine 2% Cloths 1 Application(s) daily  dextrose 5%. 1000 milliLiter(s) <Continuous>  dextrose 50% Injectable 12.5 Gram(s) once  dextrose 50% Injectable 25 Gram(s) once  dextrose 50% Injectable 25 Gram(s) once  dextrose Gel 1 Dose(s) once PRN  escitalopram 5 milliGRAM(s) daily  folic acid 1 milliGRAM(s) daily  glucagon  Injectable 1 milliGRAM(s) once PRN  hydrALAZINE Injectable 5 milliGRAM(s) every 6 hours  hydrocortisone sodium succinate Injectable 50 milliGRAM(s) every 12 hours  insulin glargine Injectable (LANTUS) 22 Unit(s) at bedtime  insulin lispro (HumaLOG) corrective regimen sliding scale   Before meals and at bedtime  isosorbide   dinitrate Tablet (ISORDIL) 5 milliGRAM(s) three times a day  midodrine 5 milliGRAM(s) every 8 hours  multivitamin 1 Tablet(s) daily  piperacillin/tazobactam IVPB. 2.25 Gram(s) every 8 hours  sodium chloride 0.9% lock flush 10 milliLiter(s) every 1 hour PRN  sodium chloride 0.9% lock flush 10 milliLiter(s) every 12 hours PRN  sodium chloride 0.9% lock flush 20 milliLiter(s) once  thiamine 100 milliGRAM(s) daily  warfarin 2.5 milliGRAM(s) once      Allergies    heparin (Unknown)    Intolerances        T(C): , Max: 36.6 (03-19-18 @ 18:17)  T(F): , Max: 97.8 (03-19-18 @ 18:17)  HR: 84 (03-20-18 @ 14:15)  BP: 92/71 (03-20-18 @ 14:15)  BP(mean): 76 (03-20-18 @ 11:00)  RR: 13 (03-20-18 @ 14:15)  SpO2: 100% (03-20-18 @ 14:15)  Wt(kg): --    03-19 @ 07:01  -  03-20 @ 07:00  --------------------------------------------------------  IN:    Albumin 5%  - 250 mL: 150 mL    argatroban Infusion: 3.2 mL    argatroban Infusion: 0.6 mL    IV PiggyBack: 50 mL    Solution: 300 mL  Total IN: 503.8 mL    OUT:    Other: 1000 mL  Total OUT: 1000 mL    Total NET: -496.2 mL      Physical exam:   Alert and oriented   No JVD   Normal air entry into the lungs, no wheezing, no crackles   RRR, normal s1, s2, no murmurs, rubs or gallops   Abdomen - soft, not tender, not distended   Extremities: no edema   HAs a HD catheter on the right of his neck          LABS:                        8.9    17.4  )-----------( 89       ( 20 Mar 2018 06:22 )             27.9     03-20    139  |  100  |  45<H>  ----------------------------<  140<H>  3.9   |  26  |  3.00<H>    Ca    9.2      20 Mar 2018 06:22  Phos  4.0     03-20  Mg     2.0     03-20    TPro  6.1  /  Alb  x   /  TBili  x   /  DBili  x   /  AST  x   /  ALT  x   /  AlkPhos  x   03-19      PT/INR - ( 20 Mar 2018 06:22 )   PT: 27.4 sec;   INR: 2.42          PTT - ( 20 Mar 2018 06:22 )  PTT:85.6 sec          RADIOLOGY & ADDITIONAL STUDIES:

## 2018-03-20 NOTE — PROGRESS NOTE ADULT - PROBLEM SELECTOR PLAN 2
Acute on chronic systolic CHF with LVEF 15 % and severely low blood pressure  Daily weight   Strict I/o monitoring. - the patient is eating outside food   Monitor renal/urine out put.  - on hydrlazaine 5 mg three times a day   - on isosorbide 5 mg three times  a day

## 2018-03-20 NOTE — PROGRESS NOTE ADULT - ASSESSMENT
64 yo M history of HFrEF 10-15% 2/2 ischemic cardiomyopathy, MI , s/p AICD vs PPM, ?Afib, Hypertension, Diabetes Mellitus Type 2 on insulin, CKD and gout, who presents with a chief complaint of generalized weakness admitted for severe sepsis 2/2 LE cellulitis vs pneumonia   Pt has developed renal failure (GILMER) and was started on CVVHD, converted to HD but is hypotensive with same.  Midodrine started.

## 2018-03-20 NOTE — PROGRESS NOTE ADULT - PROBLEM SELECTOR PLAN 1
- oliguric GILMER from ATN from septic shock   - we will restart the CVVHD today   - we will start with 25 ml/h   - volume status on the wet side   - in and outs   - daily weight   - electrolytes noted

## 2018-03-20 NOTE — PROGRESS NOTE ADULT - PROBLEM SELECTOR PLAN 2
currently on HD.  BP stable at present, pt was started on Midodrine.  Cardiology and renal discussing converting back to CVVHD rather than HD in the setting of hypotension.

## 2018-03-20 NOTE — PROGRESS NOTE ADULT - ATTENDING COMMENTS
Patient seen and examined with house-staff during bedside rounds.  Resident note read, including vitals, physical findings, laboratory data, and radiological reports.   Revisions included below.  Direct personal management at bed side and extensive interpretation of the data.  Plan was outlined and discussed in details with the housestaff.  Decision making of high complexity  Action taken for acute disease activity to reflect the level of care provided:  - medication reconciliation  - review laboratory data  -I discussed the case cardiology and will restore continuous  dialysis. Discuss with Renal.

## 2018-03-20 NOTE — PROGRESS NOTE ADULT - ASSESSMENT
62 yo M history of HFrEF 10-15% 2/2 ischemic cardiomyopathy, MI , s/p AICD vs PPM, ?Afib, Hypertension, Diabetes Mellitus Type 2 on insulin, CKD?and gout, who presents with a chief complaint of generalized weakness. Now treated for septic shock - off pressors and CHF exacerbation - still oligunaric, no urine output. Started initially on CVVHD and last week on Friday switch on IHD. Last Session done on 3/19 -1 liter - the patient was hypotensive during the session.   The primary team is concern about the volume status and requesting more UF. First the patient is noted on several occasions to eat food from outside - predominantly [employer] which is notorious with high salt content and drinking soda. In this situation it will be very difficult to manage the fluid status

## 2018-03-20 NOTE — CONSULT NOTE ADULT - SUBJECTIVE AND OBJECTIVE BOX
HPI:  62 yo M w/ pmhx of ischemic CMP (LVEF <10-15%,  s/p AICD), recent STEMI in 2017 of pLAD s/p KEEGAN, LV thrombus (on Coumadin), HTN, DM2, CKD presents with fevers/weakness for 1-2 day (on March 10). Found to be septic shcok 2/2 LLE cellulitis. He was given volume and on pressors along with dobutamine.  He was stated on CVVH for an GILMER on CKD. Cardiology consulted Friday for mixed venous sats in 40-50%, along with anemia and fluid overload.  Recommended HD and transfusion. The mixed venous sats improved to ~60. Hit ab + and started on agatroban. Dobutamine stopped- and started on hydralazine/isordil (3/19). CVP up to ~19mmHg but unable to dialyze much 2/2 low BP, so afterload reduction stopped    Interval history:  -Yesterday they took 1L off with HD, and CVP still around ~20mmHg today    PAST MEDICAL & SURGICAL HISTORY:  Type 2 diabetes mellitus with diabetic peripheral angiopathy without gangrene, with long-term curren  Essential hypertension, benign  Gout  Pacemaker  Chronic systolic heart failure  Myocardial infarction  No significant past surgical history      REVIEW OF SYSTEMS  A 10 point ROS was performed. Denies SOB/CP/GI complaints/ complaints. ROS negative except as per HPI      Allergic/Immunologic:	    heparin (HIT)      FAMILY HISTORY:  no significant FHx      Vital Signs Last 24 Hrs  T(C): 35.8 (20 Mar 2018 06:30), Max: 36.6 (19 Mar 2018 18:17)  T(F): 96.4 (20 Mar 2018 06:30), Max: 97.8 (19 Mar 2018 18:17)  HR: 90 (20 Mar 2018 08:00) (86 - 110)  BP: 77/63 (20 Mar 2018 08:00) (76/65 - 89/75)  BP(mean): 69 (20 Mar 2018 08:00) (69 - 80)  RR: 14 (20 Mar 2018 08:00) (14 - 26)  SpO2: 100% (20 Mar 2018 08:00) (89% - 100%)   I&O's Detail    I/O Summary  Net: +17L over hospital course  :Intake 502.8mL, Output 1L, Net -500mL  : Intake 510, Output 0.      19 Mar 2018 07:01  -  20 Mar 2018 07:00  --------------------------------------------------------  IN:    Albumin 5%  - 250 mL: 150 mL    argatroban Infusion: 3.2 mL    argatroban Infusion: 0.6 mL    IV PiggyBack: 50 mL    Solution: 300 mL  Total IN: 503.8 mL    OUT:    Other: 1000 mL  Total OUT: 1000 mL    Total NET: -496.2 mL        Daily     Daily Weight in k.9 (19 Mar 2018 18:05)  Admit Weight in kkg (march 10)    Physical Exam:   GEN: NAD, AAOx3  HEENT: MMM, no icterus  CV: S1 S2 RRR, +systolic murmur  Lung: CTAB  Abd: soft NT ND +BS  Ext: 2-3+ pitting edema b/l. +cellulitis on LLE with no weeping/pus.  Neuro: no focal neuro deficit    MEDICATIONS  (STANDING):  albumin human 25% IVPB 50 milliLiter(s) IV Intermittent every 1 hour  albumin human 25% IVPB 50 milliLiter(s) IV Intermittent every 6 hours  bisacodyl 5 milliGRAM(s) Oral daily  dextrose 5%. 1000 milliLiter(s) (50 mL/Hr) IV Continuous <Continuous>  dextrose 50% Injectable 12.5 Gram(s) IV Push once  dextrose 50% Injectable 25 Gram(s) IV Push once  dextrose 50% Injectable 25 Gram(s) IV Push once  escitalopram 5 milliGRAM(s) Oral daily  folic acid 1 milliGRAM(s) Oral daily  hydrALAZINE Injectable 5 milliGRAM(s) IV Push every 6 hours  hydrocortisone sodium succinate Injectable 50 milliGRAM(s) IV Push every 12 hours  insulin glargine Injectable (LANTUS) 22 Unit(s) SubCutaneous at bedtime  insulin lispro (HumaLOG) corrective regimen sliding scale   SubCutaneous Before meals and at bedtime  isosorbide   dinitrate Tablet (ISORDIL) 5 milliGRAM(s) Oral three times a day  multivitamin 1 Tablet(s) Oral daily  piperacillin/tazobactam IVPB. 2.25 Gram(s) IV Intermittent every 8 hours  thiamine 100 milliGRAM(s) Oral daily      Home meds: torsemide 20mg daily, Toprol 100mg daily, Coumadin/Plavix, januvia    ECG: NSR, low voltage, RAD, PRWP    Echo: LV borderline dilated (LVIDd 5.7), LVEF <15%, with contractility preserved in basal anterior/inferior and basal inferolateral wall. LA mildly dilated (CHIP 35cc/m2), RV borderline dilated with mildly reduced function, mild MR, mod TR, PASP 45, small pericardial effusion    LABS:                        8.9    17.4  )-----------( 89       ( 20 Mar 2018 06:22 )             27.9     03-20    139  |  100  |  45<H>  ----------------------------<  140<H>  3.9   |  26  |  3.00<H>    Ca    9.2      20 Mar 2018 06:22  Phos  4.0     -20  Mg     2.0     -20    TPro  6.1  /  Alb  x   /  TBili  x   /  DBili  x   /  AST  x   /  ALT  x   /  AlkPhos  x   03-19        PT/INR - ( 20 Mar 2018 06:22 )   PT: 27.4 sec;   INR: 2.42          PTT - ( 20 Mar 2018 06:22 )  PTT:85.6 sec

## 2018-03-20 NOTE — PROGRESS NOTE ADULT - PROBLEM SELECTOR PLAN 1
Pt remains bedbound since admission.  Pt is asking to be allowed out of bed.  Consider PT for bed exercises, if not contraindicated in the setting of HD and hypotension.

## 2018-03-20 NOTE — CONSULT NOTE ADULT - SUBJECTIVE AND OBJECTIVE BOX
Vascular Access Service Consult Note    63yMaleHEALTH ISSUES - PROBLEM Dx:  Full code status: Full code status  Palliative care by specialist: Palliative care by specialist  Generalized edema: Generalized edema  Idiopathic cardiomyopathy: Idiopathic cardiomyopathy  Debilitated patient: Debilitated patient  Delirium due to another medical condition, acute, hypoactive  Renal bone disease: Renal bone disease  Anemia due to chronic kidney disease: Anemia due to chronic kidney disease  Metabolic acidosis, increased anion gap: Metabolic acidosis, increased anion gap  GILMER (acute kidney injury): GILMER (acute kidney injury)  Starvation ketoacidosis: Starvation ketoacidosis  Hyperbilirubinemia: Hyperbilirubinemia  Type 2 diabetes mellitus with hyperglycemia, with long-term current use of insulin: Type 2 diabetes mellitus with hyperglycemia, with long-term current use of insulin  Acute renal failure, unspecified acute renal failure type: Acute renal failure, unspecified acute renal failure type  Hyperkalemia: Hyperkalemia  Acute on chronic systolic heart failure: Acute on chronic systolic heart failure  Severe sepsis: Severe sepsis             Diagnosis: cellulitis     Indications for Vascular Access (Check all that apply)  [X  ]  Antibiotic Therapy       Antibiotic Prescribed:  zosyn                                                                           Expected Duration of Therapy:               [  ]  IV Hydration  [  ]  Total Parenteral Nutrition  [  ]  Chemotherapy  [  ]  Difficult Venous Access  [  ]  CVP monitoring  [  ]  Medications with high potential for tissue necrosis on extravasation  [  ]  Other    Screening (Check all that apply)  Previous Radiation to chest  [  ] Yes      [ X ]  No  Breast Cancer                          [  ] Left     [  ]  Right    [  X]  No  Lymph Node Dissection         [  ] Left     [  ]  Right    [X ]  No  Pacemaker or ICD                   [  ] Left     [  ]  Right    [X  ]  No  Upper Extremity DVT             [  ] Left     [  ]  Right    [ X ]  No  Chronic Kidney Disease         [ X ]  Yes     [  ]  No  Hemodialysis                           [X  ]  Yes     [  ]  No  AV Fistula/ Graft                     [  ]  Left    [  ]  Right    [X  ]  No  Temp>101F in past 24 H       [  ]  Yes     [ X ]  No  H/O PICC/Midline                   [  ]  Yes     [X ]  No    Lab data:                        8.9    17.4  )-----------( 89       ( 20 Mar 2018 06:22 )             27.9     03-20    139  |  100  |  45<H>  ----------------------------<  140<H>  3.9   |  26  |  3.00<H>    Ca    9.2      20 Mar 2018 06:22  Phos  4.0     03-20  Mg     2.0     03-20    TPro  6.1  /  Alb  x   /  TBili  x   /  DBili  x   /  AST  x   /  ALT  x   /  AlkPhos  x   03-19    PT/INR - ( 20 Mar 2018 06:22 )   PT: 27.4 sec;   INR: 2.42          PTT - ( 20 Mar 2018 06:22 )  PTT:85.6 sec          I have reviewed the chart, interviewed and examined the patient and determined that this patient:  [  ] Is a candidate for a PICC line  [ X ] Is a candidate for a Midline  [  ] Is not a candidate for vascular access device (reason)    Lumens:    [  X] Single  [  ] Double

## 2018-03-20 NOTE — PROGRESS NOTE ADULT - PROBLEM SELECTOR PLAN 5
Patient's Calcium 9.2 and phosphorus 4.0 at present.  Obtain intact PTH, VItamin D 25 and Vitamin D 1,25 level for now  Low phos/renal diet.  Monitor BMP daily for now.

## 2018-03-20 NOTE — CONSULT NOTE ADULT - ATTENDING COMMENTS
This is a pleasant 62 yo M with ICM, LVEF 10-15% admitted to MICU with septic shock due to LLE cellulitis c/b GILMER on CKD (unknown baseline) treated with CVVHD initially, but now he is off pressors and was transitioned to iHD with SBP 90/60 with drops to SBP 70s when on iHD. CVP remains 20 cm H2O. An attempt at hydral and isordil failed due to marginal BPs. This is a pleasant 62 yo M with ICM, LVEF 10-15% admitted to MICU with septic shock due to LLE cellulitis c/b GILMER on CKD (unknown baseline) treated with CVVHD initially, but now he is off pressors and was transitioned to iHD with SBP 90/60 with drops to SBP 70s when on iHD. CVP remains 20 cm H2O. An attempt at hydral and isordil failed due to marginal BPs.    He is sleepy, but arousable, and answers questions appropriately. He was receiving albumin boluses during the HD to maintain BP, which will further injure the right heart. I spoke with Dr. Guzman from Nephrology and we agreed that CVVHD would be more helpful at volume removal to drive the filling pressures down with a goal CVP 8-10 cm H2O.    The midodrine is also quite challenging for systolic HF as it will increase SVR and prohibit offloading of the LV. Once he is euvolemic, it will be important to get him off the midodrine so that we can work on GDMT for his HF. Ideally, once his CVP is in better range, we will place a PAC and measure full hemodynamics to optimize therapies.    If his medical issues are stable and he would be better served in the CCU for the hemodynamic and cardiac issues, please discuss transfer with the CCU attending. I am away from the hospital for the next few days, but back on Friday. If any concerns or issues, please contact the Cardiology fellow and I am available by phone.

## 2018-03-20 NOTE — CONSULT NOTE ADULT - ASSESSMENT
62 yo M w/ ischemic cardiomyopathy presents with acute on chronic systolic heart failure in the setting of sepsis and GILMER.  -agree with holding ACE/ARB/spironolactone/Lasix until we know if there will be kidney recovery  -afterload reduction with hydral/isordil when pt able to tolerate  -hold beta blocker in setting of recent decompensation  -Needs to be optimized from a volume standpoint as CVP persistently high and clinically overloaded on exam. 62 yo M w/ ischemic cardiomyopathy presents with acute on chronic systolic heart failure in the setting of sepsis and GILMER.  -agree with holding ACE/ARB/spironolactone/Lasix until we know if there will be kidney recovery  -afterload reduction with hydral/isordil when pt able to tolerate  -hold beta blocker in setting of recent decompensation  -Needs to be optimized from a volume standpoint as CVP persistently high and clinically overloaded on exam.  -would d/c midodrine if possible as only increasing afterload. 62 yo M w/ ischemic cardiomyopathy presents with acute on chronic systolic heart failure in the setting of sepsis and GILMER.  -agree with holding ACE/ARB/spironolactone/Lasix until we know if there will be kidney recovery, in the meantime would hold afterload reduction in setting of low BPs  -hold beta blocker in setting of recent decompensation  -Needs to be optimized from a volume standpoint as CVP persistently high and clinically overloaded on exam. May benefit from CVVHD.  -would d/c midodrine if possible as only increasing afterload and inducing more strain on the heart

## 2018-03-20 NOTE — PROGRESS NOTE ADULT - ASSESSMENT
63M PMH HFrEF 10-15% (ischemic), MI, s/p AICD vs PPM, possible Afib, HTN, DM2 on insulin, possible CKD, and gout, who presents with a chief complaint of generalized weakness s/p septic shock likely 2/2 LE cellulitis     NEURO  #Toxic metabolic encephalopathy  - significant improvement in mentation compared to last week.   - Improving as sepsis and uremia resolves     CARDIOVASCULAR   # HYPOTENSION   - 2/2 shock, likely septic shock with component of cardiogenic shock in setting of HFrEF   - Pt persistently hypotensive, will start on Midodrine 5mg q8h   - off pressors and dobutamine.   - F/u heart failure team recs  - Slowly downtrending WBC count, continue to monitor CBC  - Gallium scan ordered- prelim read with only LLE cellulutis   - Lactate downtrended to WNL   - CHF with severely reduced EF 15%, caution with fluids. Per renal recs, can give IV albumin for fluids.  - Switch to intermittent HD given improved BP    # CHF exacerbation  - CHF with EF 10-15% per pt 2/2 ischemic cardiomyopathy s/p AICD as indicated by CXR   - Elevated BNP on admission with R sided effusion and edema  - Cardiac shock major component of hypotension   - Persistent pulm congestion noted on chest Xray. Will consult Renal for HD   - c./w Hydralazine 5mg  q6h and Isordil 5mg tid as tolerated   - f/u CHF recs   - Give fluids judiciously given low EF in setting of likely sepsis  - Unclear medical regimen opt. Per [facility] ( and [place]) pt has not picked up Torsemide 20 or Metoprolol 100 since November.   - Hold ACE/Metoprolol in setting of sepsis  - CVO@ saturation improved after transfusion    #AFIB  - PT with hx ofAifb and LV thrombus on coumadin.    -d/c  Hep drip given concern of HIT.d/c Argatroban.  - Dose coumadin daily     - Pt s/p FFP (3/13,3/14) and Vit K (3/13) for thoracocentesis and HD catheter placement.        #CAD- Hx of MI s/p AICD  - Pt with pLAD in 7/2017, was started on Aspirin and Brelinta per records.  - Will start on asp 81.   - c/w Coumadin     # LE Edema   - LE edema with chronic venous stasis.   - Duplex does not show DVT    PULMONARY   #Acute resp failure   - pt with R loculated plural effusion noted to have increased work of breathing. Likely 2/2 fluid overload, Fluid studies consistent with exudative effusion.   - s/p thoracentesis on 3/13 with R chest tube placement. Chest tube removed 3/15.   - On 2L NC saturating well.   - c/w Solucortef to 50mf q12h today. WIll taper to 25mg q12 tomorrow. (Was on 50 q6h 3/12-3/17, 50q8 3/18). recent hx of Steroid administration 2 months ago  - Plan to keep Hb 9-10. s/p  pRBC transfusion on 3/12. 3/16  - Monitor resp status  - Appreciate CHF team recs regarding fluid status. Continue dialysis for fluid removal    # Possible Pneumothorax due to trapped lung   - Not seen on recent Xrays. Possibly skin fold   - Pt HD stable. Will monitor     #RENAL   #GILMER- 2/2 ischemic ATN  -Unknown baseline Cr presenting with Cr 3.93 with metabolic derangements.   - Likely 2/2 Sepsis, low intravascular volume and CHF  - Trend BUN and Cr.  - Renal team on board, HD catheter placed on 3/13 and replaced on 3/18. c/w  intermittent HD.   - CT abdomen and pelvis without acute obstruction.   - retroperitoneal ultrasound showing medical renal disease.     #Hyperkalemia   - PT with elevated K to 5.7 on admission,  no EKG changes   - Continue telemetry monitoring  - Trend K   - Can given Kayexalate if persists   - c/w dialysis     #Metabolic acidosis   - 2/2 Lactate, starvation ketoacidosis and uremia   - Improved    #ID   - septic shock likely due to L LE cellulitis however continues to have persistently elevated WBCs not fully explained by steroids.  - TTE with no vegetations. Pt not stable for RUKHSANA   - Gallium scan read shows LLE cellulitis. , c/w zosyn Day 11/14. d/c Vanco (3/11-3/17)    - repeat LE CT does not show abscess or gas.   - R lung Effusion likely from overload. s/p thoracentesis with improvement in resp status   -  HIV negative  - Vascular surgery on board. Recommending Amputation as an option if pt continues to be unstable.       #GI   - pt wit Elevated Bilirubin and Alk phos. Abd exam benign  - CT abdomen/pelvis consistent with cholelithiasis and ascites.     #HEME    #Thrombocytopenia  - Intermediate probability of HIT, will dc Hep gtt  - COuld also be related to sepsis and Vanc;/zosyn  - Monitor platelets   -PF4 atibodies positive. Pending ELVIRA results.      # elevated INR. Unclear etiology. Pt on coumadin?   - Continue with daily coumadin dosing   - Given Vit K and FFP3/12. FFP 3/13   - Monitor coags    #Anemia  - Stable at 8-9. Anemia of chronic disease.  -s/p 1pRBC on 3/12, 3/16   - WIll keep Hb around 9-10     #ENDOCRINE   - S/p insulin drip  - Currently on MISS.   - Monitor blood glucose   - A1C 8.6%  - Increased Lantus to 22U yesterday. Monitor blood glucose and ISS coverage.     F: No IVF   E: Replete K<4 Mg<2, caution in setting of acute renal failure  N: Renal Diet     Lines:  R HD catheter (3/17). L IJ (3/12). R A line (3/12), [place]. PICC line placement today    Full code   Dispo: MICU  Ppx: Coumadin

## 2018-03-20 NOTE — PROGRESS NOTE ADULT - SUBJECTIVE AND OBJECTIVE BOX
Subjective:  Patient feels he is getting stronger.  He has no complaints today.  Patient went to HD yesterday.  Medical record were obtained from [place] showing her had STEMI in 2017, AICD placement and history of LV thrombus    PAST MEDICAL & SURGICAL HISTORY:  Type 2 diabetes mellitus with diabetic peripheral angiopathy without gangrene, with long-term curren  Essential hypertension, benign  Gout  AICD  Chronic systolic heart failure  Myocardial infarction  No significant past surgical history      Vital Signs Last 24 Hrs  T(C): 35.6 (20 Mar 2018 10:34), Max: 36.6 (19 Mar 2018 18:17)  T(F): 96 (20 Mar 2018 10:34), Max: 97.8 (19 Mar 2018 18:17)  HR: 90 (20 Mar 2018 08:00) (86 - 110)  BP: 77/63 (20 Mar 2018 08:00) (76/65 - 89/75)  BP(mean): 69 (20 Mar 2018 08:00) (69 - 80)  RR: 14 (20 Mar 2018 08:00) (14 - 26)  SpO2: 100% (20 Mar 2018 08:00) (89% - 100%)   I&O's Detail    19 Mar 2018 07:01  -  20 Mar 2018 07:00  --------------------------------------------------------  IN:    Albumin 5%  - 250 mL: 150 mL    argatroban Infusion: 3.2 mL    argatroban Infusion: 0.6 mL    IV PiggyBack: 50 mL    Solution: 300 mL  Total IN: 503.8 mL    OUT:    Other: 1000 mL  Total OUT: 1000 mL    Total NET: -496.2 mL        Daily     Daily Weight in k.9 (19 Mar 2018 18:05)    Physical Exam:   General: AAOx2-3  HEENT: NC/AT, PERRL  Neck: supple  Respiratory: CTAB   Cardiac: Tachycardic, +S1/S2, no murmurs, rubs, or gallops  Gastrointestinal: soft, NT/ND; no rebound or guarding; +BS  Genitourinary: Testicular edema   Extremities: Warm to touch. Bilateral chronic venous stasis areas of skin breaks. R ankles with dry dressing. SCDs in place, +1-2 pitting edema   Musculoskeletal: NROM x4; no joint swelling, tenderness or erythema    MEDICATIONS  (STANDING):  albumin human 25% IVPB 50 milliLiter(s) IV Intermittent every 1 hour  albumin human 25% IVPB 50 milliLiter(s) IV Intermittent every 6 hours  bisacodyl 5 milliGRAM(s) Oral daily  dextrose 5%. 1000 milliLiter(s) (50 mL/Hr) IV Continuous <Continuous>  dextrose 50% Injectable 12.5 Gram(s) IV Push once  dextrose 50% Injectable 25 Gram(s) IV Push once  dextrose 50% Injectable 25 Gram(s) IV Push once  escitalopram 5 milliGRAM(s) Oral daily  folic acid 1 milliGRAM(s) Oral daily  hydrALAZINE Injectable 5 milliGRAM(s) IV Push every 6 hours  hydrocortisone sodium succinate Injectable 50 milliGRAM(s) IV Push every 12 hours  insulin glargine Injectable (LANTUS) 22 Unit(s) SubCutaneous at bedtime  insulin lispro (HumaLOG) corrective regimen sliding scale   SubCutaneous Before meals and at bedtime  isosorbide   dinitrate Tablet (ISORDIL) 5 milliGRAM(s) Oral three times a day  midodrine 5 milliGRAM(s) Oral every 8 hours  multivitamin 1 Tablet(s) Oral daily  piperacillin/tazobactam IVPB. 2.25 Gram(s) IV Intermittent every 8 hours  thiamine 100 milliGRAM(s) Oral daily  warfarin 2.5 milliGRAM(s) Oral once      LABS:                        8.9    17.4  )-----------( 89       ( 20 Mar 2018 06:22 )             27.9     03-20    139  |  100  |  45<H>  ----------------------------<  140<H>  3.9   |  26  |  3.00<H>    Ca    9.2      20 Mar 2018 06:22  Phos  4.0     03-20  Mg     2.0     03-20    TPro  6.1  /  Alb  x   /  TBili  x   /  DBili  x   /  AST  x   /  ALT  x   /  AlkPhos  x   03-19        PT/INR - ( 20 Mar 2018 06:22 )   PT: 27.4 sec;   INR: 2.42          PTT - ( 20 Mar 2018 06:22 )  PTT:85.6 sec      RADIOLOGY & ADDITIONAL STUDIES:    	    ECG:    < from: 12 Lead ECG (03.10.18 @ 13:17) >  Ventricular Rate 125 BPM    Atrial Rate 141 BPM    QRS Duration 76 ms    Q-T Interval 284 ms    QTC Calculation(Bezet) 409 ms    R Axis 261 degrees    T Axis 23 degrees    Diagnosis Line Accelerated Junctional rhythm with fusion complexes  Low voltage QRS  Inferior infarct , age undetermined  Possible Anterolateral infarct , age undetermined  Abnormal ECG      < from: Echocardiogram (18 @ 14:19) >  Interpretation Summary  Preserved left ventricular thickness. The left ventricle is borderline   dilated.   Severe hypokinesis of the left ventricle. Contractility is preserved in   basal   anterior, basal inferior, and basal inferolateral. The left ventricular   ejection fraction is severely reduced.  The left ventricular ejection   fraction   is 15%.The right ventricle is borderline dilated. The right ventricular   systolic function is mildly reduced.  The left atrium is mildly dilated.   The   left atrial volume index is 35 cc/m2 (normal <34cc/m2)  The right atrium   is   mildly dilated. Mildly calcified trileaflet aortic valve. There is trace   aortic regurgitation.  Tethered mitral valve leaflets with normal   opening.There is mild mitral regurgitation.Structurally normal tricuspid   valve. There is moderate tricuspid regurgitation. There is moderate   pulmonary   hypertension. The pulmonary artery systolic pressure is estimated to be   54   mmHg.  Structurally normal pulmonic valve. There is trace pulmonic   regurgitation.A small pericardial effusion noted. Bilateral pleural   effusion   noted.No prior echo is available for comparison. Subjective:  Patient feels he is getting stronger.  He has no complaints today.  Patient went to HD yesterday.  Patient was started on midodrine.  Medical record were obtained from [place] showing her had STEMI in 2017, AICD placement and history of LV thrombus    PAST MEDICAL & SURGICAL HISTORY:  Type 2 diabetes mellitus with diabetic peripheral angiopathy without gangrene, with long-term curren  Essential hypertension, benign  Gout  AICD  Chronic systolic heart failure  Myocardial infarction  No significant past surgical history      Vital Signs Last 24 Hrs  T(C): 35.6 (20 Mar 2018 10:34), Max: 36.6 (19 Mar 2018 18:17)  T(F): 96 (20 Mar 2018 10:34), Max: 97.8 (19 Mar 2018 18:17)  HR: 90 (20 Mar 2018 08:00) (86 - 110)  BP: 77/63 (20 Mar 2018 08:00) (76/65 - 89/75)  BP(mean): 69 (20 Mar 2018 08:00) (69 - 80)  RR: 14 (20 Mar 2018 08:00) (14 - 26)  SpO2: 100% (20 Mar 2018 08:00) (89% - 100%)   I&O's Detail    19 Mar 2018 07:01  -  20 Mar 2018 07:00  --------------------------------------------------------  IN:    Albumin 5%  - 250 mL: 150 mL    argatroban Infusion: 3.2 mL    argatroban Infusion: 0.6 mL    IV PiggyBack: 50 mL    Solution: 300 mL  Total IN: 503.8 mL    OUT:    Other: 1000 mL  Total OUT: 1000 mL    Total NET: -496.2 mL        Daily     Daily Weight in k.9 (19 Mar 2018 18:05)    Physical Exam:   General: AAOx2-3  HEENT: NC/AT, PERRL  Neck: supple  Respiratory: CTAB   Cardiac: Tachycardic, +S1/S2, no murmurs, rubs, or gallops  Gastrointestinal: soft, NT/ND; no rebound or guarding; +BS  Genitourinary: Testicular edema   Extremities: Warm to touch. Bilateral chronic venous stasis areas of skin breaks. R ankles with dry dressing. SCDs in place, +1-2 pitting edema   Musculoskeletal: NROM x4; no joint swelling, tenderness or erythema    MEDICATIONS  (STANDING):  albumin human 25% IVPB 50 milliLiter(s) IV Intermittent every 1 hour  albumin human 25% IVPB 50 milliLiter(s) IV Intermittent every 6 hours  bisacodyl 5 milliGRAM(s) Oral daily  dextrose 5%. 1000 milliLiter(s) (50 mL/Hr) IV Continuous <Continuous>  dextrose 50% Injectable 12.5 Gram(s) IV Push once  dextrose 50% Injectable 25 Gram(s) IV Push once  dextrose 50% Injectable 25 Gram(s) IV Push once  escitalopram 5 milliGRAM(s) Oral daily  folic acid 1 milliGRAM(s) Oral daily  hydrALAZINE Injectable 5 milliGRAM(s) IV Push every 6 hours  hydrocortisone sodium succinate Injectable 50 milliGRAM(s) IV Push every 12 hours  insulin glargine Injectable (LANTUS) 22 Unit(s) SubCutaneous at bedtime  insulin lispro (HumaLOG) corrective regimen sliding scale   SubCutaneous Before meals and at bedtime  isosorbide   dinitrate Tablet (ISORDIL) 5 milliGRAM(s) Oral three times a day  midodrine 5 milliGRAM(s) Oral every 8 hours  multivitamin 1 Tablet(s) Oral daily  piperacillin/tazobactam IVPB. 2.25 Gram(s) IV Intermittent every 8 hours  thiamine 100 milliGRAM(s) Oral daily  warfarin 2.5 milliGRAM(s) Oral once      LABS:                        8.9    17.4  )-----------( 89       ( 20 Mar 2018 06:22 )             27.9     03-20    139  |  100  |  45<H>  ----------------------------<  140<H>  3.9   |  26  |  3.00<H>    Ca    9.2      20 Mar 2018 06:22  Phos  4.0     03-20  Mg     2.0     03-20    TPro  6.1  /  Alb  x   /  TBili  x   /  DBili  x   /  AST  x   /  ALT  x   /  AlkPhos  x   03-19        PT/INR - ( 20 Mar 2018 06:22 )   PT: 27.4 sec;   INR: 2.42          PTT - ( 20 Mar 2018 06:22 )  PTT:85.6 sec      RADIOLOGY & ADDITIONAL STUDIES:    	    ECG:    < from: 12 Lead ECG (03.10.18 @ 13:17) >  Ventricular Rate 125 BPM    Atrial Rate 141 BPM    QRS Duration 76 ms    Q-T Interval 284 ms    QTC Calculation(Bezet) 409 ms    R Axis 261 degrees    T Axis 23 degrees    Diagnosis Line Accelerated Junctional rhythm with fusion complexes  Low voltage QRS  Inferior infarct , age undetermined  Possible Anterolateral infarct , age undetermined  Abnormal ECG      < from: Echocardiogram (18 @ 14:19) >  Interpretation Summary  Preserved left ventricular thickness. The left ventricle is borderline   dilated.   Severe hypokinesis of the left ventricle. Contractility is preserved in   basal   anterior, basal inferior, and basal inferolateral. The left ventricular   ejection fraction is severely reduced.  The left ventricular ejection   fraction   is 15%.The right ventricle is borderline dilated. The right ventricular   systolic function is mildly reduced.  The left atrium is mildly dilated.   The   left atrial volume index is 35 cc/m2 (normal <34cc/m2)  The right atrium   is   mildly dilated. Mildly calcified trileaflet aortic valve. There is trace   aortic regurgitation.  Tethered mitral valve leaflets with normal   opening.There is mild mitral regurgitation.Structurally normal tricuspid   valve. There is moderate tricuspid regurgitation. There is moderate   pulmonary   hypertension. The pulmonary artery systolic pressure is estimated to be   54   mmHg.  Structurally normal pulmonic valve. There is trace pulmonic   regurgitation.A small pericardial effusion noted. Bilateral pleural   effusion   noted.No prior echo is available for comparison.

## 2018-03-20 NOTE — PROGRESS NOTE ADULT - ASSESSMENT
64 y/o male with PMH of HFrEF 10-15% (ischemic),  STEMI 7/2017, s/p AICD , possible Afib, HTN, DM2 on insulin, possible CKD, and gout, who presents with a chief complaint of generalized weakness found to have septic shock 2/2 to cellulitis.  Patient was hypotensive since admission was on levophed and dobutamine. Cardiology was consulted for persistent hypotension and low mixed venous sat with concern for cardiogenic shock.  Medical record were obtained from [place] showing her had STEMI in 7/2017, AICD placement and history of LV thrombus      -CAD (Recent STEMI nearly 8 months ago)  DAPT and statin should be started     Heart failure:  -Patient clinically improved , warm on exam and has better mixed o2 saturation but CVP is still high  -dobutamine was discontinued   -Agree with afterload reduction with hydralazine and nitrate as BP tolerates  -Better optimal volume load as CVP is still 19 per nephrology plan      Atrial fibrillation with hisotry of LV thrombus  -no evidence of LV thrombus on the current 2d echo in this hospitalization  -patient is now in NSR   -d/c  Hep drip given concern of HIT. was on Argatroban for anticoagulation.  -f/u platelet count  -switched to coumadin, f/u INR with target between 2-3 62 y/o male with PMH of HFrEF 10-15% (ischemic),  STEMI 7/2017, s/p AICD , possible Afib, HTN, DM2 on insulin, possible CKD, and gout, who presents with a chief complaint of generalized weakness found to have septic shock 2/2 to cellulitis.  Patient was hypotensive since admission was on levophed and dobutamine. Cardiology was consulted for persistent hypotension and low mixed venous sat with concern for cardiogenic shock.  Medical record were obtained from [place] showing her had STEMI in 7/2017, AICD placement and history of LV thrombus      -CAD (Recent STEMI nearly 8 months ago)  c/w aspirin and coumadin   add statin to his current medications  would benefit from BB his BP get more controlled and in compensated HF    Heart failure:  -Patient clinically improved , warm on exam and has better mixed o2 saturation but CVP is still high  -dobutamine was discontinued   -Better optimal volume load as CVP is still 19 per nephrology plan, may add inotropics during HD to take more fluid out.  -hold midodrine for as it increase the afterload against the low EF.  -hold the nitrate and hydralazine given low BP  -introduction of heart failure medications when patient's volume status improves and BP improves      Atrial fibrillation with hisotry of LV thrombus  -chadsvasc of 3  -no evidence of LV thrombus on the current 2d echo in this hospitalization  -patient is now in NSR   -d/c  Hep drip given concern of HIT. was on Argatroban for anticoagulation.  -f/u platelet count  -switched to coumadin, f/u INR with target between 2-3

## 2018-03-20 NOTE — PROGRESS NOTE ADULT - PROBLEM SELECTOR PLAN 4
Anemia of chronic renal disease with hemoglobin 8.9  Ferritin: 1377 and T sat% 34.  No EPO or iron at present due to probable GILMER vs CKD.

## 2018-03-20 NOTE — PROGRESS NOTE ADULT - ATTENDING COMMENTS
ischemic ATN from septic/cardiogenic shock now with elevated CVPs and anuric. Start CVVHD for hypotension and need for volume removal. Will start with 25cc/hr UF and increase to 50cc/hr by this evening, using vasopressin for pressor support if required. Also on midodrine. Unclear baseline cr

## 2018-03-20 NOTE — PROGRESS NOTE ADULT - SUBJECTIVE AND OBJECTIVE BOX
HUNTER BRIZUELA   [id number]     (1955):     HPI:  64 yo M history of HFrEF 10-15% 2/2 ischemic cardiomyopathy, MI , s/p AICD vs PPM, ?Afib, Hypertension, Diabetes Mellitus Type 2 on insulin, CKD?and gout, who presents with a chief complaint of generalized weakness. Pt wad admitted to [facility] 2 months ago for gout and was sent to rehab. He was discharged home mid Feb with VNS. In the past 2 weeks patient has noted increased leg pain and weakness bilaterally. In the last few days, he has also experienced generalized weakness prompting him to come to ER.   In ER, noted to have t max of 102, SBP 70s, leukocytosis to 32.8, Lactate of 6.6, Acute renal failure with severe metabolic derangements. Given 3.5L fluids and Vanc/Zosyn. MICU consulted. (10 Mar 2018 18:51)    Pt has been hospitalized x 9 days.  Pt has had a complicated course including pleural effusion with chest tube placement, renal failure with both CVVHD and now HD and significant hypotension requiring vasopressors.  Pt if now off pressors but has HIT and is on agatroban.  Pt continues to have short bursts of VT.        ROS:   no complaints at this time, pt reports feeling "better"  and "comfortable"  Unable to attain due to:                      Dyspnea (Heriberto 0-10):  0/10                      N/V (Y/N):     no                         Secretions (Y/N):  no                Agitation(Y/N): no  Pain (Y/N):      no  -Provocation/Palliation:  -Quality/Quantity:  -Radiating:  -Severity:  -Timing/Frequency:  -Impact on ADLs:    General:  + unintentional weight loss of 15-20 lbs in the weeks prior to admission  HEENT:    Denied  Neck:  Denied  CVS:  Denied  Resp:  no dyspnea  GI:  Denied  :  Denied  Musc:  weak  Neuro:  sleepy  Psych:  Denies anxiety and depression  Skin:  Denied  Lymph:  Denied    Allergies:  No Known Allergies    Intolerances    Opiate Naive (Y/N):   yes  -iStop reviewed (Y/N):  yes ref # [id number]    Medications:       MEDICATIONS  (STANDING):  albumin human 25% IVPB      albumin human 25% IVPB 50 milliLiter(s) IV Intermittent once  albumin human 25% IVPB 50 milliLiter(s) IV Intermittent once  albumin human 25% IVPB 50 milliLiter(s) IV Intermittent every 1 hour  aspirin  chewable 81 milliGRAM(s) Oral daily  bisacodyl 5 milliGRAM(s) Oral daily  chlorhexidine 2% Cloths 1 Application(s) Topical daily  dextrose 5%. 1000 milliLiter(s) (50 mL/Hr) IV Continuous <Continuous>  dextrose 50% Injectable 12.5 Gram(s) IV Push once  dextrose 50% Injectable 25 Gram(s) IV Push once  dextrose 50% Injectable 25 Gram(s) IV Push once  escitalopram 5 milliGRAM(s) Oral daily  folic acid 1 milliGRAM(s) Oral daily  hydrALAZINE Injectable 5 milliGRAM(s) IV Push every 6 hours  hydrocortisone sodium succinate Injectable 50 milliGRAM(s) IV Push every 12 hours  insulin glargine Injectable (LANTUS) 22 Unit(s) SubCutaneous at bedtime  insulin lispro (HumaLOG) corrective regimen sliding scale   SubCutaneous Before meals and at bedtime  isosorbide   dinitrate Tablet (ISORDIL) 5 milliGRAM(s) Oral three times a day  midodrine 5 milliGRAM(s) Oral every 8 hours  multivitamin 1 Tablet(s) Oral daily  piperacillin/tazobactam IVPB. 2.25 Gram(s) IV Intermittent every 8 hours  sodium chloride 0.9% lock flush 20 milliLiter(s) IV Push once  thiamine 100 milliGRAM(s) Oral daily  warfarin 2.5 milliGRAM(s) Oral once    MEDICATIONS  (PRN):  acetaminophen   Tablet. 650 milliGRAM(s) Oral every 6 hours PRN Moderate Pain (4 - 6)  dextrose Gel 1 Dose(s) Oral once PRN Blood Glucose LESS THAN 70 milliGRAM(s)/deciliter  glucagon  Injectable 1 milliGRAM(s) IntraMuscular once PRN Glucose LESS THAN 70 milligrams/deciliter  sodium chloride 0.9% lock flush 10 milliLiter(s) IV Push every 1 hour PRN After each medication administration  sodium chloride 0.9% lock flush 10 milliLiter(s) IV Push every 12 hours PRN Lumen of catheter NOT used    Labs:                            8.9    17.4  )-----------( 89       ( 20 Mar 2018 06:22 )             27.9     03-20    139  |  100  |  45<H>  ----------------------------<  140<H>  3.9   |  26  |  3.00<H>    Ca    9.2      20 Mar 2018 06:22  Phos  4.0     -  Mg     2.0         TPro  6.1  /  Alb  x   /  TBili  x   /  DBili  x   /  AST  x   /  ALT  x   /  AlkPhos  x         Imaging:        Reviewed      EXAM:  NM INFLAMMATORY LOC GALLIUM WB                          PROCEDURE DATE:  2018         ******PRELIMINARY REPORT******    ******PRELIMINARY REPORT******      INTERPRETATION:  RESIDENT PRELIMINARY REPORT    INFLAMMATION IMAGING - WHOLE-BODY    Indication: Rule out infectious focus. Cellulitis not improving on   antibiotic therapy.    Procedure: The patient received an intravenous injection of 4.91 mCi of   gallium-67 citrate on date and images of the whole-body were obtained at   about 24 hours.    Findings: There is abnormally increased activity of the anterior and   posterior aspects of the left lower leg. Findings are suggestive of   cellulitis. No focal increased uptake is identified to suggest   phlegmon/abscess. There is slightly increased activity at the sacroiliac   joints bilaterally. No other abnormal uptake is identified.    Impression: Findings suggestive of cellulitis as above.    < end of copied text >    PEx:  ICU Vital Signs Last 24 Hrs  T(C): 35.6 (20 Mar 2018 14:15), Max: 36.6 (19 Mar 2018 18:17)  T(F): 96 (20 Mar 2018 14:15), Max: 97.8 (19 Mar 2018 18:17)  HR: 84 (20 Mar 2018 14:15) (84 - 110)  BP: 92/71 (20 Mar 2018 14:15) (76/65 - 92/71)  BP(mean): 76 (20 Mar 2018 11:00) (69 - 80)  ABP: 90/60 (19 Mar 2018 20:00) (82/54 - 90/60)  ABP(mean): 70 (19 Mar 2018 20:00) (64 - 70)  RR: 13 (20 Mar 2018 14:15) (13 - 26)  SpO2: 100% (20 Mar 2018 14:15) (96% - 100%)      General:  ill appearing, thin, not distressed   HEENT:  nc/at, thin, slight temporal wasting, moist oral cavity  Neck:   supple, neg lymphadenopathy, + R HD catheter, L IJ TLC  CVS:  irreg, frequent ventricular ectopy, rate controlled, pacer noted to  L chest wall  Resp:  non-labored, on NC  GI:   large, distended slightly, soft, + BS nontender, slight dependent edema  :  not examined, no barnes present  Musc:   weak, thin, follows commands  Ext: crusty, dressed lower extremities  Neuro:  awake, slightly delayed, orientated x 3, has poor recall of recent events  Psych:   mood appropriate for circumstances  Skin:  thin, dry, cool, + generalized edema  Lymph:  neg  Preadmit Karnofsky:   50 %           Current Karnofsky:   30    %  Cachexia (Y/N):   yes  BMI:  22    Advanced Directives:     Full Code     HCP noted on chart     DPOA  (for finances)       Decision maker:   pt was deemed not to have decision making capacity on  by psychiatry  Legal surrogate:  pts dgt Cristal Castro [phone] is pts HCP    Social History:   single, lives by self, pt has a HHA 4hrs/day, 3 days per week.  Pt has 2 adult children (Cristal aged 37) and pts son, is 24 and lives in [facility]. 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    He is retired but reports he is not on disability.   Pt is "spiritual" and practices a "Sabianism" raquel background  Pt is open to massage therapy and music therapy.   Per notes, pt has not been compliant with his medication (not picked up prescription from his outpt pharmacy) since 2017.    GOALS OF CARE DISCUSSION:       Palliative care info/counseling provided-- following	           Family meeting- met with pts dgt who is HCP on        Advanced Directives addressed please see Advance Care Planning Note-- Ad dir reviewed.  I will continue to explore pts wishes re: advanced directives 	           Documentation of GOC: 	          REFERRALS:	        Palliative Med        Unit SW/Case Mgmt       - referred       Massage Therapy-- referred       Music Therapy-- referred       Nutrition-- following

## 2018-03-21 LAB
% M SPIKE: SIGNIFICANT CHANGE UP %
24R-OH-CALCIDIOL SERPL-MCNC: 8.6 NG/ML — LOW (ref 30–80)
ANA PAT FLD IF-IMP: (no result)
ANA TITR SER: (no result)
ANION GAP SERPL CALC-SCNC: 12 MMOL/L — SIGNIFICANT CHANGE UP (ref 5–17)
ANION GAP SERPL CALC-SCNC: 13 MMOL/L — SIGNIFICANT CHANGE UP (ref 5–17)
ANION GAP SERPL CALC-SCNC: 16 MMOL/L — SIGNIFICANT CHANGE UP (ref 5–17)
ANION GAP SERPL CALC-SCNC: 17 MMOL/L — SIGNIFICANT CHANGE UP (ref 5–17)
APTT BLD: 74.2 SEC — HIGH (ref 27.5–37.4)
APTT BLD: 77.7 SEC — HIGH (ref 27.5–37.4)
BUN SERPL-MCNC: 39 MG/DL — HIGH (ref 7–23)
BUN SERPL-MCNC: 40 MG/DL — HIGH (ref 7–23)
BUN SERPL-MCNC: 42 MG/DL — HIGH (ref 7–23)
BUN SERPL-MCNC: 48 MG/DL — HIGH (ref 7–23)
CALCIUM SERPL-MCNC: 8.7 MG/DL — SIGNIFICANT CHANGE UP (ref 8.4–10.5)
CALCIUM SERPL-MCNC: 8.8 MG/DL — SIGNIFICANT CHANGE UP (ref 8.4–10.5)
CALCIUM SERPL-MCNC: 8.9 MG/DL — SIGNIFICANT CHANGE UP (ref 8.4–10.5)
CALCIUM SERPL-MCNC: 9.1 MG/DL — SIGNIFICANT CHANGE UP (ref 8.4–10.5)
CHLORIDE SERPL-SCNC: 100 MMOL/L — SIGNIFICANT CHANGE UP (ref 96–108)
CHLORIDE SERPL-SCNC: 102 MMOL/L — SIGNIFICANT CHANGE UP (ref 96–108)
CHLORIDE SERPL-SCNC: 97 MMOL/L — SIGNIFICANT CHANGE UP (ref 96–108)
CHLORIDE SERPL-SCNC: 99 MMOL/L — SIGNIFICANT CHANGE UP (ref 96–108)
CO2 SERPL-SCNC: 23 MMOL/L — SIGNIFICANT CHANGE UP (ref 22–31)
CO2 SERPL-SCNC: 23 MMOL/L — SIGNIFICANT CHANGE UP (ref 22–31)
CO2 SERPL-SCNC: 24 MMOL/L — SIGNIFICANT CHANGE UP (ref 22–31)
CO2 SERPL-SCNC: 25 MMOL/L — SIGNIFICANT CHANGE UP (ref 22–31)
CREAT SERPL-MCNC: 2.52 MG/DL — HIGH (ref 0.5–1.3)
CREAT SERPL-MCNC: 2.59 MG/DL — HIGH (ref 0.5–1.3)
CREAT SERPL-MCNC: 2.84 MG/DL — HIGH (ref 0.5–1.3)
CREAT SERPL-MCNC: 2.9 MG/DL — HIGH (ref 0.5–1.3)
GLUCOSE BLDC GLUCOMTR-MCNC: 105 MG/DL — HIGH (ref 70–99)
GLUCOSE BLDC GLUCOMTR-MCNC: 63 MG/DL — LOW (ref 70–99)
GLUCOSE BLDC GLUCOMTR-MCNC: 93 MG/DL — SIGNIFICANT CHANGE UP (ref 70–99)
GLUCOSE BLDC GLUCOMTR-MCNC: 94 MG/DL — SIGNIFICANT CHANGE UP (ref 70–99)
GLUCOSE SERPL-MCNC: 108 MG/DL — HIGH (ref 70–99)
GLUCOSE SERPL-MCNC: 112 MG/DL — HIGH (ref 70–99)
GLUCOSE SERPL-MCNC: 120 MG/DL — HIGH (ref 70–99)
GLUCOSE SERPL-MCNC: 94 MG/DL — SIGNIFICANT CHANGE UP (ref 70–99)
HCT VFR BLD CALC: 28.8 % — LOW (ref 39–50)
HCT VFR BLD CALC: 29.1 % — LOW (ref 39–50)
HCT VFR BLD CALC: 29.4 % — LOW (ref 39–50)
HCT VFR BLD CALC: 30.5 % — LOW (ref 39–50)
HGB BLD-MCNC: 9.3 G/DL — LOW (ref 13–17)
HGB BLD-MCNC: 9.4 G/DL — LOW (ref 13–17)
HGB BLD-MCNC: 9.4 G/DL — LOW (ref 13–17)
HGB BLD-MCNC: 9.7 G/DL — LOW (ref 13–17)
INR BLD: 2.15 — HIGH (ref 0.88–1.16)
INR BLD: 2.19 — HIGH (ref 0.88–1.16)
INTERPRETATION SERPL IFE-IMP: SIGNIFICANT CHANGE UP
M-SPIKE: SIGNIFICANT CHANGE UP G/DL (ref 0–0)
MAGNESIUM SERPL-MCNC: 1.9 MG/DL — SIGNIFICANT CHANGE UP (ref 1.6–2.6)
MAGNESIUM SERPL-MCNC: 1.9 MG/DL — SIGNIFICANT CHANGE UP (ref 1.6–2.6)
MCHC RBC-ENTMCNC: 30.1 PG — SIGNIFICANT CHANGE UP (ref 27–34)
MCHC RBC-ENTMCNC: 30.1 PG — SIGNIFICANT CHANGE UP (ref 27–34)
MCHC RBC-ENTMCNC: 30.7 PG — SIGNIFICANT CHANGE UP (ref 27–34)
MCHC RBC-ENTMCNC: 30.8 PG — SIGNIFICANT CHANGE UP (ref 27–34)
MCHC RBC-ENTMCNC: 31.6 G/DL — LOW (ref 32–36)
MCHC RBC-ENTMCNC: 31.8 G/DL — LOW (ref 32–36)
MCHC RBC-ENTMCNC: 32.3 G/DL — SIGNIFICANT CHANGE UP (ref 32–36)
MCHC RBC-ENTMCNC: 32.6 G/DL — SIGNIFICANT CHANGE UP (ref 32–36)
MCV RBC AUTO: 94.4 FL — SIGNIFICANT CHANGE UP (ref 80–100)
MCV RBC AUTO: 94.7 FL — SIGNIFICANT CHANGE UP (ref 80–100)
MCV RBC AUTO: 95.1 FL — SIGNIFICANT CHANGE UP (ref 80–100)
MCV RBC AUTO: 95.1 FL — SIGNIFICANT CHANGE UP (ref 80–100)
PHOSPHATE SERPL-MCNC: 4.2 MG/DL — SIGNIFICANT CHANGE UP (ref 2.5–4.5)
PHOSPHATE SERPL-MCNC: 4.2 MG/DL — SIGNIFICANT CHANGE UP (ref 2.5–4.5)
PLATELET # BLD AUTO: 81 K/UL — LOW (ref 150–400)
PLATELET # BLD AUTO: 82 K/UL — LOW (ref 150–400)
PLATELET # BLD AUTO: 86 K/UL — LOW (ref 150–400)
PLATELET # BLD AUTO: 86 K/UL — LOW (ref 150–400)
POTASSIUM SERPL-MCNC: 3.8 MMOL/L — SIGNIFICANT CHANGE UP (ref 3.5–5.3)
POTASSIUM SERPL-MCNC: 3.8 MMOL/L — SIGNIFICANT CHANGE UP (ref 3.5–5.3)
POTASSIUM SERPL-MCNC: 3.9 MMOL/L — SIGNIFICANT CHANGE UP (ref 3.5–5.3)
POTASSIUM SERPL-MCNC: 4 MMOL/L — SIGNIFICANT CHANGE UP (ref 3.5–5.3)
POTASSIUM SERPL-SCNC: 3.8 MMOL/L — SIGNIFICANT CHANGE UP (ref 3.5–5.3)
POTASSIUM SERPL-SCNC: 3.8 MMOL/L — SIGNIFICANT CHANGE UP (ref 3.5–5.3)
POTASSIUM SERPL-SCNC: 3.9 MMOL/L — SIGNIFICANT CHANGE UP (ref 3.5–5.3)
POTASSIUM SERPL-SCNC: 4 MMOL/L — SIGNIFICANT CHANGE UP (ref 3.5–5.3)
PROT PATTERN SERPL ELPH-IMP: SIGNIFICANT CHANGE UP
PROTHROM AB SERPL-ACNC: 24.2 SEC — HIGH (ref 9.8–12.7)
PROTHROM AB SERPL-ACNC: 24.7 SEC — HIGH (ref 9.8–12.7)
RBC # BLD: 3.05 M/UL — LOW (ref 4.2–5.8)
RBC # BLD: 3.06 M/UL — LOW (ref 4.2–5.8)
RBC # BLD: 3.09 M/UL — LOW (ref 4.2–5.8)
RBC # BLD: 3.22 M/UL — LOW (ref 4.2–5.8)
RBC # FLD: 18.2 % — HIGH (ref 10.3–16.9)
RBC # FLD: 18.3 % — HIGH (ref 10.3–16.9)
RBC # FLD: 18.6 % — HIGH (ref 10.3–16.9)
RBC # FLD: 18.6 % — HIGH (ref 10.3–16.9)
SODIUM SERPL-SCNC: 136 MMOL/L — SIGNIFICANT CHANGE UP (ref 135–145)
SODIUM SERPL-SCNC: 137 MMOL/L — SIGNIFICANT CHANGE UP (ref 135–145)
SODIUM SERPL-SCNC: 138 MMOL/L — SIGNIFICANT CHANGE UP (ref 135–145)
SODIUM SERPL-SCNC: 140 MMOL/L — SIGNIFICANT CHANGE UP (ref 135–145)
T4 FREE SERPL-MCNC: 0.59 NG/DL — LOW (ref 0.7–1.48)
TSH SERPL-MCNC: 3.3 UIU/ML — SIGNIFICANT CHANGE UP (ref 0.35–4.94)
VIT D25+D1,25 OH+D1,25 PNL SERPL-MCNC: 12.7 PG/ML — LOW (ref 19.9–79.3)
WBC # BLD: 14.1 K/UL — HIGH (ref 3.8–10.5)
WBC # BLD: 14.6 K/UL — HIGH (ref 3.8–10.5)
WBC # BLD: 15.8 K/UL — HIGH (ref 3.8–10.5)
WBC # BLD: 16.3 K/UL — HIGH (ref 3.8–10.5)
WBC # FLD AUTO: 14.1 K/UL — HIGH (ref 3.8–10.5)
WBC # FLD AUTO: 14.6 K/UL — HIGH (ref 3.8–10.5)
WBC # FLD AUTO: 15.8 K/UL — HIGH (ref 3.8–10.5)
WBC # FLD AUTO: 16.3 K/UL — HIGH (ref 3.8–10.5)

## 2018-03-21 PROCEDURE — 36556 INSERT NON-TUNNEL CV CATH: CPT

## 2018-03-21 PROCEDURE — 36558 INSERT TUNNELED CV CATH: CPT

## 2018-03-21 PROCEDURE — 77001 FLUOROGUIDE FOR VEIN DEVICE: CPT | Mod: 26

## 2018-03-21 PROCEDURE — 99233 SBSQ HOSP IP/OBS HIGH 50: CPT | Mod: 25,GC

## 2018-03-21 PROCEDURE — 90945 DIALYSIS ONE EVALUATION: CPT

## 2018-03-21 PROCEDURE — 71045 X-RAY EXAM CHEST 1 VIEW: CPT | Mod: 26,76

## 2018-03-21 PROCEDURE — 76937 US GUIDE VASCULAR ACCESS: CPT | Mod: 26

## 2018-03-21 RX ORDER — VASOPRESSIN 20 [USP'U]/ML
0.01 INJECTION INTRAVENOUS
Qty: 100 | Refills: 0 | Status: DISCONTINUED | OUTPATIENT
Start: 2018-03-21 | End: 2018-03-26

## 2018-03-21 RX ORDER — DEXTROSE 50 % IN WATER 50 %
25 SYRINGE (ML) INTRAVENOUS ONCE
Qty: 0 | Refills: 0 | Status: COMPLETED | OUTPATIENT
Start: 2018-03-21 | End: 2018-03-21

## 2018-03-21 RX ORDER — PIPERACILLIN AND TAZOBACTAM 4; .5 G/20ML; G/20ML
3.38 INJECTION, POWDER, LYOPHILIZED, FOR SOLUTION INTRAVENOUS EVERY 6 HOURS
Qty: 0 | Refills: 0 | Status: DISCONTINUED | OUTPATIENT
Start: 2018-03-21 | End: 2018-03-21

## 2018-03-21 RX ORDER — WARFARIN SODIUM 2.5 MG/1
2.5 TABLET ORAL ONCE
Qty: 0 | Refills: 0 | Status: COMPLETED | OUTPATIENT
Start: 2018-03-21 | End: 2018-03-21

## 2018-03-21 RX ORDER — HYDROCORTISONE 20 MG
25 TABLET ORAL EVERY 12 HOURS
Qty: 0 | Refills: 0 | Status: DISCONTINUED | OUTPATIENT
Start: 2018-03-21 | End: 2018-03-22

## 2018-03-21 RX ORDER — INSULIN GLARGINE 100 [IU]/ML
15 INJECTION, SOLUTION SUBCUTANEOUS AT BEDTIME
Qty: 0 | Refills: 0 | Status: DISCONTINUED | OUTPATIENT
Start: 2018-03-21 | End: 2018-03-21

## 2018-03-21 RX ORDER — INSULIN GLARGINE 100 [IU]/ML
10 INJECTION, SOLUTION SUBCUTANEOUS AT BEDTIME
Qty: 0 | Refills: 0 | Status: DISCONTINUED | OUTPATIENT
Start: 2018-03-21 | End: 2018-03-22

## 2018-03-21 RX ADMIN — Medication 81 MILLIGRAM(S): at 17:09

## 2018-03-21 RX ADMIN — Medication 50 MILLIGRAM(S): at 06:50

## 2018-03-21 RX ADMIN — ISOSORBIDE DINITRATE 5 MILLIGRAM(S): 5 TABLET ORAL at 22:26

## 2018-03-21 RX ADMIN — WARFARIN SODIUM 2.5 MILLIGRAM(S): 2.5 TABLET ORAL at 22:27

## 2018-03-21 RX ADMIN — ESCITALOPRAM OXALATE 5 MILLIGRAM(S): 10 TABLET, FILM COATED ORAL at 17:08

## 2018-03-21 RX ADMIN — PIPERACILLIN AND TAZOBACTAM 200 GRAM(S): 4; .5 INJECTION, POWDER, LYOPHILIZED, FOR SOLUTION INTRAVENOUS at 06:51

## 2018-03-21 RX ADMIN — Medication 1 TABLET(S): at 17:09

## 2018-03-21 RX ADMIN — Medication 100 MILLIGRAM(S): at 17:08

## 2018-03-21 RX ADMIN — INSULIN GLARGINE 10 UNIT(S): 100 INJECTION, SOLUTION SUBCUTANEOUS at 22:27

## 2018-03-21 RX ADMIN — Medication 1 MILLIGRAM(S): at 17:09

## 2018-03-21 RX ADMIN — MIDODRINE HYDROCHLORIDE 5 MILLIGRAM(S): 2.5 TABLET ORAL at 06:50

## 2018-03-21 RX ADMIN — Medication 25 MILLIGRAM(S): at 17:08

## 2018-03-21 RX ADMIN — ISOSORBIDE DINITRATE 5 MILLIGRAM(S): 5 TABLET ORAL at 06:50

## 2018-03-21 RX ADMIN — CHLORHEXIDINE GLUCONATE 1 APPLICATION(S): 213 SOLUTION TOPICAL at 17:11

## 2018-03-21 RX ADMIN — Medication 25 GRAM(S): at 17:28

## 2018-03-21 NOTE — PROGRESS NOTE ADULT - PROBLEM SELECTOR PLAN 1
Oliguric GILMER from ATN from septic shock. Hypervolemic on exam.   - c/w CVVHD with UF 25cc/h   - in and outs   - daily weight   - electrolytes noted

## 2018-03-21 NOTE — PROGRESS NOTE ADULT - ATTENDING COMMENTS
Tolerated 25cc/hr UF overnight, bfr reduced to 150/hr overnight but nursing 2/2 "alarms". Will try 200 BFR/hr and 50/hr UF with aid of vasopressor for better diuresis.   Unclear baseline Cr, will get collateral from family

## 2018-03-21 NOTE — PROGRESS NOTE ADULT - ASSESSMENT
63M PMH HFrEF 10-15% (ischemic), MI, s/p AICD vs PPM, possible Afib, HTN, DM2 on insulin, possible CKD, and gout, who presents with a chief complaint of generalized weakness s/p septic shock likely 2/2 LE cellulitis       NEURO  #Toxic metabolic encephalopathy  - significant improvement in mentation compared to last week.   - Improving as sepsis and uremia resolves     ID- Resolved septic shock 2/2 Cellulitis   - Improving WBC count. Pt hypothermic this AM possibly due to CVVHD infusion however will repeat blood cultures  - Tapering steroids down to 25 q12 today. Will d/c Zosyn today (12 days total given). Was previously also on Vanco (3/11-3/17). Reconsider abx if patient   - Will obtain RUQ dopper to rule out portal vein thrombosis   - TTE with no vegetations. Pt not stable for RUKHSANA   - Gallium scan read shows LLE cellulitis. ,     - repeat LE CT does not show abscess or gas.   - R lung Effusion likely from overload. s/p thoracentesis with improvement in resp status   -  HIV negative  - Vascular surgery on board. Recommending Amputation as an option if pt continues to be unstable.     CARDIOVASCULAR   # HYPOTENSION   - 2/2 cardiogenic shock in setting of HFrEF   - CHF team recs appreciated. WIll d/c Midodrine and have started on Vasopressin drip. Will maintain SBP>90   -  Will continue CVVHD to help reduce CVP   - Will transfer to CCU once otherwise stable.   - Septic shock resolved. Was previously on pressors and dobutamine.   - Slowly downtrending WBC count, continue to monitor CBC  - Gallium scan with only LLE cellulitis   - Lactate downtrended to WNL   - CHF with severely reduced EF 15%, caution with fluids. Per renal recs, can give IV albumin for fluids.  - Switch to intermittent HD given improved BP    # CHF exacerbation  - CHF with EF 10-15% per pt 2/2 ischemic cardiomyopathy s/p AICD as indicated by CXR   - Elevated BNP on admission with R sided effusion and edema  - Cardiac shock causing hypotension   - Persistent pulm congestion noted on chest Xray. c/w CVVHD  - c./w Hydralazine 5mg  q6h and Isordil 5mg tid as tolerated   - Give fluids judiciously given low EF in setting of likely sepsis  - Unclear medical regimen opt. Per [facility] ( and [place]) pt has not picked up Torsemide 20 or Metoprolol 100 since November.   - Hold ACE/Metoprolol in setting of sepsis  - CVO saturation improved after transfusion    #AFIB  - PT with hx of Afib and LV thrombus on coumadin.    -d/c  Hep drip given concern of HIT.d/c Argatroban.  - Dose coumadin daily     - Pt s/p FFP (3/13,3/14) and Vit K (3/13) for thoracocentesis and HD catheter placement.        #CAD- Hx of MI s/p AICD  - Pt with pLAD in 7/2017, was started on Aspirin and Brelinta per records.  - Will start on asp 81, Atorvastatin 80   - c/w Coumadin     # LE Edema   - LE edema with chronic venous stasis.   - Duplex does not show DVT    PULMONARY   #Acute resp failure- Resolved   - pt with R loculated plural effusion noted to have increased work of breathing. Likely 2/2 fluid overload, Fluid studies consistent with exudative effusion.   - s/p thoracentesis on 3/13 with R chest tube placement. Chest tube removed 3/15.   - On 2L NC saturating well.   - Tapered Solucortef to 25 mg q12h today. . (Was on 50 q6h 3/12-3/17, 50q8 3/18, 50 q12 3/19-3/20). recent hx of Steroid administration 2 months ago  - s/p  pRBC transfusion on 3/12. 3/16  - Monitor resp status  - Appreciate CHF team recs regarding fluid status. Continue dialysis for fluid removal      #RENAL   #GILMER- 2/2 ischemic ATN  -Unknown baseline Cr presenting with Cr 3.93 with metabolic derangements.   - Likely 2/2 Sepsis, low intravascular volume and CHF  - Trend BUN and Cr.  - Renal team on board, HD catheter placed on 3/13 and replaced on 3/18. c/w CVVHD currently removing 25cc/hour   - Will send for IR HD cath once stable.   - CT abdomen and pelvis without acute obstruction.   - retroperitoneal ultrasound showing medical renal disease.     #Hyperkalemia   - PT with elevated K to 5.7 on admission,  no EKG changes   - Continue telemetry monitoring  - Trend K   - Can given Kayexalate if persists   - c/w dialysis     #Metabolic acidosis   - 2/2 Lactate, starvation ketoacidosis and uremia   - Improved      #GI   - pt wit Elevated Bilirubin and Alk phos. Abd exam benign  - CT abdomen/pelvis consistent with cholelithiasis and ascites.     #HEME    #Thrombocytopenia  - ELVIRA negative but PF4 was positive. Unlikely HIT.    - Could have be related to Vanc/zosyn. Trend platelet count now that both are discontinued.   - Sepsis also in differential.     # elevated INR. Unclear etiology. Pt on coumadin?   - Continue with daily coumadin dosing   - Given Vit K and FFP3/12. FFP 3/13   - Monitor coags    #Anemia  - Stable at 8-9. Anemia of chronic disease.  -s/p 1pRBC on 3/12, 3/16   - WIll keep Hb around 9-10     #ENDOCRINE   - S/p insulin drip  - Currently on MISS.   - Monitor blood glucose   - A1C 8.6%  - Decreasing Lantus to 15U as we taper down steroids. Monitor blood glucose and ISS coverage.     F: No IVF   E: Replete K<4 Mg<2, caution in setting of acute renal failure  N: Renal Diet     Lines:  R HD catheter (3/17). R A line (3/12), barnes. PICC line placement today    Full code   Dispo: MICU  Ppx: Coumadin

## 2018-03-21 NOTE — PROGRESS NOTE ADULT - ASSESSMENT
63M PMHx  HFrEF (10-15%) 2/2 ischemic cardiomyopathy, CAD s/p MI , s/p AICD/PPM, Afib and LV thrombus on Coumadin, HT< IDDM, CKD, gout admitted with septic shock 2/2 LE cellulitis. Hospital course c/b loculated R pleural effusion s/p CT placement (now removed), CHF exacerbati and anuric ATN 2/2 hypotension. Patient has been on CVVHD, did have IHD 3/19 with 1 L removed but has not been able to tolerate additional sessions due to hypotension. 63M PMHx  HFrEF (10-15%) 2/2 ischemic cardiomyopathy, CAD s/p MI , s/p AICD/PPM, Afib and LV thrombus on Coumadin, HT< IDDM, CKD, gout admitted with septic shock 2/2 LE cellulitis. Hospital course c/b loculated R pleural effusion s/p CT placement (now removed), CHF exacerbati and anuric ATN 2/2 hypotension. Patient has been on CVVHD, did have IHD 3/19 with 1 L removed but has not been able to tolerate additional sessions due to hypotension.     FELLOWS ADDENDUM:  Agree with resident note  Seen on CVVHD and tolerating well.   UF Rate remains at 25mL/hr in setting of systemic hypotension  Clearances are appropriate with calculated effluent rate of 23.4mL/kg/hr assuming premorbid weight of 80kg    Would want to increase UF rate to 50mL/hr to make more headway.  However, understand this is limited by his low systemic BP.   Renew CVVHD

## 2018-03-21 NOTE — PROGRESS NOTE ADULT - ASSESSMENT
Plan  -agree with CVVHD for fluid removal  -d/c midodrine, hold neurohormonal antagonists for low BP  -coumadin and aspirin (STEMI in july, but would not put on triple therapy this far after event given his bleeding risk)

## 2018-03-21 NOTE — PROCEDURE NOTE - PROCEDURE
<<-----Click on this checkbox to enter Procedure Central line placement  03/21/2018    Active  [id number]

## 2018-03-21 NOTE — PROGRESS NOTE ADULT - PROBLEM SELECTOR PLAN 2
EF 10-15%. Hypotensive SBP 70-80s with very narrow pulse pressure. Hypervolemic on exam with LE edema and pulmonary edema.   -attempted afterload reduction with Hydral/Imdur but patient unable to tolerate due to hypotension  -difficult to remove volume with HD as patient hypotensive

## 2018-03-21 NOTE — PROGRESS NOTE ADULT - SUBJECTIVE AND OBJECTIVE BOX
INTERVAL HPI/OVERNIGHT EVENTS: Hydralazine held given hypotension     SUBJECTIVE: Patient seen and examined at bedside. still feels weak. Not making any urine    OBJECTIVE:    VITAL SIGNS:  ICU Vital Signs Last 24 Hrs  T(C): 34.8 (21 Mar 2018 06:30), Max: 36.1 (20 Mar 2018 18:20)  T(F): 94.7 (21 Mar 2018 06:30), Max: 96.9 (20 Mar 2018 18:20)  HR: 80 (21 Mar 2018 10:00) (72 - 114)  BP: 76/62 (21 Mar 2018 10:00) (76/62 - 98/68)  BP(mean): 67 (21 Mar 2018 10:00) (67 - 84)  ABP: --  ABP(mean): --  RR: 19 (21 Mar 2018 10:00) (9 - 26)  SpO2: 100% (21 Mar 2018 10:00) (78% - 100%)        03-20 @ 07:01  -  03-21 @ 07:00  --------------------------------------------------------  IN: 0 mL / OUT: 351 mL / NET: -351 mL      CAPILLARY BLOOD GLUCOSE      POCT Blood Glucose.: 94 mg/dL (21 Mar 2018 11:54)      PHYSICAL EXAM:    HEENT: NC/AT, PERRL  Neck: supple.  R HD cath   Respiratory: Decreased breath sounds at the bases. Upper lobes crackles b/l   Cardiac: +S1/S2, ii/vi systolic murmur   Gastrointestinal: Soft nd/nt, no rebound or guarding. +BS  Genitourinary: Testicular edema   Extremities: 1+ edema. warm to touch.  b/l areas of venous stasis. RLE with dry dressing  Skin: no rash  MSK: no joint swelling  Vasc: 1+ DP pulses        MEDICATIONS:  MEDICATIONS  (STANDING):  aspirin  chewable 81 milliGRAM(s) Oral daily  atorvastatin 80 milliGRAM(s) Oral at bedtime  bisacodyl 5 milliGRAM(s) Oral daily  chlorhexidine 2% Cloths 1 Application(s) Topical daily  dextrose 5%. 1000 milliLiter(s) (50 mL/Hr) IV Continuous <Continuous>  dextrose 50% Injectable 12.5 Gram(s) IV Push once  dextrose 50% Injectable 25 Gram(s) IV Push once  dextrose 50% Injectable 25 Gram(s) IV Push once  escitalopram 5 milliGRAM(s) Oral daily  folic acid 1 milliGRAM(s) Oral daily  hydrALAZINE Injectable 5 milliGRAM(s) IV Push every 6 hours  hydrocortisone sodium succinate Injectable 25 milliGRAM(s) IV Push every 12 hours  insulin glargine Injectable (LANTUS) 15 Unit(s) SubCutaneous at bedtime  insulin lispro (HumaLOG) corrective regimen sliding scale   SubCutaneous Before meals and at bedtime  isosorbide   dinitrate Tablet (ISORDIL) 5 milliGRAM(s) Oral three times a day  melatonin 3 milliGRAM(s) Oral at bedtime  multivitamin 1 Tablet(s) Oral daily  PureFlow Dialysate RFP-400 (K 2 / Ca 3) 5000 milliLiter(s) (2000 mL/Hr) CRRT <Continuous>  sodium chloride 0.9% lock flush 20 milliLiter(s) IV Push once  thiamine 100 milliGRAM(s) Oral daily  vasopressin Infusion 0.01 Unit(s)/Min (0.6 mL/Hr) IV Continuous <Continuous>  warfarin 2.5 milliGRAM(s) Oral once    MEDICATIONS  (PRN):  acetaminophen   Tablet. 650 milliGRAM(s) Oral every 6 hours PRN Moderate Pain (4 - 6)  dextrose Gel 1 Dose(s) Oral once PRN Blood Glucose LESS THAN 70 milliGRAM(s)/deciliter  glucagon  Injectable 1 milliGRAM(s) IntraMuscular once PRN Glucose LESS THAN 70 milligrams/deciliter  sodium chloride 0.9% lock flush 10 milliLiter(s) IV Push every 1 hour PRN After each medication administration  sodium chloride 0.9% lock flush 10 milliLiter(s) IV Push every 12 hours PRN Lumen of catheter NOT used      ALLERGIES:  Allergies    heparin (Unknown)    Intolerances        LABS:                        9.7    16.3  )-----------( 82       ( 21 Mar 2018 07:15 )             30.5     03-21    140  |  99  |  42<H>  ----------------------------<  108<H>  3.9   |  24  |  2.84<H>    Ca    9.1      21 Mar 2018 07:11  Phos  4.2     03-21  Mg     1.9     03-21    TPro  6.1  /  Alb  3.4<L>  /  TBili  x   /  DBili  x   /  AST  x   /  ALT  x   /  AlkPhos  x   03-19    PT/INR - ( 21 Mar 2018 07:13 )   PT: 24.2 sec;   INR: 2.15          PTT - ( 21 Mar 2018 07:13 )  PTT:77.7 sec      RADIOLOGY & ADDITIONAL TESTS: Reviewed.

## 2018-03-21 NOTE — PROGRESS NOTE ADULT - SUBJECTIVE AND OBJECTIVE BOX
Overnight Event / Subjective: Patient was hypothermic overnight, placed on warming blanket. Reports feeling OK. No pain in his lower extremities, no SOB or cough. Had 1 BM. Has not made any urine.     VITALS  Vital Signs Last 24 Hrs  T(C): 34.8 (21 Mar 2018 06:30), Max: 36.1 (20 Mar 2018 18:20)  T(F): 94.7 (21 Mar 2018 06:30), Max: 96.9 (20 Mar 2018 18:20)  HR: 80 (21 Mar 2018 10:00) (72 - 114)  BP: 76/62 (21 Mar 2018 10:00) (76/62 - 98/68)  BP(mean): 67 (21 Mar 2018 10:00) (67 - 84)  RR: 19 (21 Mar 2018 10:00) (9 - 26)  SpO2: 100% (21 Mar 2018 10:00) (78% - 100%)    I&O's Summary    20 Mar 2018 07:01  -  21 Mar 2018 07:00  --------------------------------------------------------  IN: 0 mL / OUT: 351 mL / NET: -351 mL        CAPILLARY BLOOD GLUCOSE      POCT Blood Glucose.: 93 mg/dL (21 Mar 2018 07:01)  POCT Blood Glucose.: 119 mg/dL (20 Mar 2018 22:40)  POCT Blood Glucose.: 134 mg/dL (20 Mar 2018 18:06)  POCT Blood Glucose.: 121 mg/dL (20 Mar 2018 11:16)      PHYSICAL EXAM  General: NAD, frail appearing, laying under warming blanket  Head: NC/AT;   Eyes: PERRL; EOMI; anicteric sclera  Neck: Supple; no JVD  Respiratory: bibasilar crackles, otherwise clear  Cardiovascular: RRR, S1S2, no murmurs   Gastrointestinal: Soft; NTND w/out rebound tenderness or guarding; bowel sounds normal  Extremities: WWP; 2+ edema bileral, thickened, hyperpigmented skin on shins  Neurological:   no obvious focal deficits    MEDICATIONS  (STANDING):  aspirin  chewable 81 milliGRAM(s) Oral daily  atorvastatin 80 milliGRAM(s) Oral at bedtime  bisacodyl 5 milliGRAM(s) Oral daily  chlorhexidine 2% Cloths 1 Application(s) Topical daily  dextrose 5%. 1000 milliLiter(s) (50 mL/Hr) IV Continuous <Continuous>  dextrose 50% Injectable 12.5 Gram(s) IV Push once  dextrose 50% Injectable 25 Gram(s) IV Push once  dextrose 50% Injectable 25 Gram(s) IV Push once  escitalopram 5 milliGRAM(s) Oral daily  folic acid 1 milliGRAM(s) Oral daily  hydrALAZINE Injectable 5 milliGRAM(s) IV Push every 6 hours  hydrocortisone sodium succinate Injectable 25 milliGRAM(s) IV Push every 12 hours  insulin glargine Injectable (LANTUS) 15 Unit(s) SubCutaneous at bedtime  insulin lispro (HumaLOG) corrective regimen sliding scale   SubCutaneous Before meals and at bedtime  isosorbide   dinitrate Tablet (ISORDIL) 5 milliGRAM(s) Oral three times a day  melatonin 3 milliGRAM(s) Oral at bedtime  multivitamin 1 Tablet(s) Oral daily  PureFlow Dialysate RFP-400 (K 2 / Ca 3) 5000 milliLiter(s) (2000 mL/Hr) CRRT <Continuous>  sodium chloride 0.9% lock flush 20 milliLiter(s) IV Push once  thiamine 100 milliGRAM(s) Oral daily  vasopressin Infusion 0.01 Unit(s)/Min (0.6 mL/Hr) IV Continuous <Continuous>  warfarin 2.5 milliGRAM(s) Oral once    MEDICATIONS  (PRN):  acetaminophen   Tablet. 650 milliGRAM(s) Oral every 6 hours PRN Moderate Pain (4 - 6)  dextrose Gel 1 Dose(s) Oral once PRN Blood Glucose LESS THAN 70 milliGRAM(s)/deciliter  glucagon  Injectable 1 milliGRAM(s) IntraMuscular once PRN Glucose LESS THAN 70 milligrams/deciliter  sodium chloride 0.9% lock flush 10 milliLiter(s) IV Push every 1 hour PRN After each medication administration  sodium chloride 0.9% lock flush 10 milliLiter(s) IV Push every 12 hours PRN Lumen of catheter NOT used      LABS                        9.7    16.3  )-----------( 82       ( 21 Mar 2018 07:15 )             30.5     03-21    140  |  99  |  42<H>  ----------------------------<  108<H>  3.9   |  24  |  2.84<H>    Ca    9.1      21 Mar 2018 07:11  Phos  4.2     03-21  Mg     1.9     03-21    TPro  6.1  /  Alb  3.4<L>  /  TBili  x   /  DBili  x   /  AST  x   /  ALT  x   /  AlkPhos  x   03-19    LIVER FUNCTIONS - ( 19 Mar 2018 15:27 )  Alb: 3.4 g/dL / Pro: 6.1 g/dL / ALK PHOS: x     / ALT: x     / AST: x     / GGT: x           PT/INR - ( 21 Mar 2018 07:13 )   PT: 24.2 sec;   INR: 2.15          PTT - ( 21 Mar 2018 07:13 )  PTT:77.7 sec

## 2018-03-21 NOTE — PROGRESS NOTE ADULT - PROBLEM SELECTOR PLAN 3
Hb 9.7, uptrending. Ferritin: 1377 and T sat% 34.  No EPO or iron at present due to probable GILMER vs CKD.

## 2018-03-21 NOTE — PROGRESS NOTE ADULT - ATTENDING COMMENTS
Patient seen and examined with house-staff during bedside rounds.  Resident note read, including vitals, physical findings, laboratory data, and radiological reports.   Revisions included below.  Direct personal management at bed side and extensive interpretation of the data.  Plan was outlined and discussed in details with the housestaff.  Decision making of high complexity  Action taken for acute disease activity to reflect the level of care provided:  - medication reconciliation  - review laboratory data  - evaluated hypothermia.  panculture.  gallium scan was unremarkable except for the cellulitis  - Evaluated hemodynamic and will start Vasopressin to maintain adequate BP  - continue continuous hemodialysis  - Discontinue on Zosyn  - repeat TFT  - CXR improving  - Hydralazine was on hold   - Mental status is improving   - Monitor BS with decrease in steroids and adjust insulin  - Negative for HIT

## 2018-03-21 NOTE — PROGRESS NOTE ADULT - SUBJECTIVE AND OBJECTIVE BOX
Subjective:  - pt feeling ok, no CP/SOB    PAST MEDICAL & SURGICAL HISTORY:  Type 2 diabetes mellitus with diabetic peripheral angiopathy without gangrene, with long-term curren  Essential hypertension, benign  Gout  Pacemaker  Chronic systolic heart failure  Myocardial infarction  No significant past surgical history      Vital Signs Last 24 Hrs  T(C): 34.8 (21 Mar 2018 06:30), Max: 36.1 (20 Mar 2018 18:20)  T(F): 94.7 (21 Mar 2018 06:30), Max: 96.9 (20 Mar 2018 18:20)  HR: 80 (21 Mar 2018 10:00) (72 - 114)  BP: 76/62 (21 Mar 2018 10:00) (76/62 - 98/68)  BP(mean): 67 (21 Mar 2018 10:00) (67 - 84)  RR: 19 (21 Mar 2018 10:00) (9 - 26)  SpO2: 100% (21 Mar 2018 10:00) (78% - 100%)   I&O's Detail    20 Mar 2018 07:01  -  21 Mar 2018 07:00  --------------------------------------------------------  IN:  Total IN: 0 mL    OUT:    Other: 351 mL  Total OUT: 351 mL    Total NET: -351 mL        Daily     Daily Weight in k.1 (20 Mar 2018 15:15)    Physical Exam:   GEN: NAD, AAOx3  HEENT: MMM, no icterus  CV: S1 S2 RRR, no MRG  Lung: CTAB  Abd: soft NT ND +BS  Ext: 2+ edema b/l, with stasis changes, cellulitis much improved  Neuro: no focal neuro deficit    MEDICATIONS  (STANDING):  aspirin  chewable 81 milliGRAM(s) Oral daily  atorvastatin 80 milliGRAM(s) Oral at bedtime  bisacodyl 5 milliGRAM(s) Oral daily  chlorhexidine 2% Cloths 1 Application(s) Topical daily  dextrose 5%. 1000 milliLiter(s) (50 mL/Hr) IV Continuous <Continuous>  dextrose 50% Injectable 12.5 Gram(s) IV Push once  dextrose 50% Injectable 25 Gram(s) IV Push once  dextrose 50% Injectable 25 Gram(s) IV Push once  escitalopram 5 milliGRAM(s) Oral daily  folic acid 1 milliGRAM(s) Oral daily  hydrALAZINE Injectable 5 milliGRAM(s) IV Push every 6 hours  hydrocortisone sodium succinate Injectable 25 milliGRAM(s) IV Push every 12 hours  insulin glargine Injectable (LANTUS) 15 Unit(s) SubCutaneous at bedtime  insulin lispro (HumaLOG) corrective regimen sliding scale   SubCutaneous Before meals and at bedtime  isosorbide   dinitrate Tablet (ISORDIL) 5 milliGRAM(s) Oral three times a day  melatonin 3 milliGRAM(s) Oral at bedtime  multivitamin 1 Tablet(s) Oral daily  PureFlow Dialysate RFP-400 (K 2 / Ca 3) 5000 milliLiter(s) (2000 mL/Hr) CRRT <Continuous>  sodium chloride 0.9% lock flush 20 milliLiter(s) IV Push once  thiamine 100 milliGRAM(s) Oral daily  vasopressin Infusion 0.01 Unit(s)/Min (0.6 mL/Hr) IV Continuous <Continuous>  warfarin 2.5 milliGRAM(s) Oral once      LABS:                        9.7    16.3  )-----------( 82       ( 21 Mar 2018 07:15 )             30.5     03-21    140  |  99  |  42<H>  ----------------------------<  108<H>  3.9   |  24  |  2.84<H>    Ca    9.1      21 Mar 2018 07:11  Phos  4.2     03-  Mg     1.9     -21    TPro  6.1  /  Alb  3.4<L>  /  TBili  x   /  DBili  x   /  AST  x   /  ALT  x   /  AlkPhos  x   03-19        PT/INR - ( 21 Mar 2018 07:13 )   PT: 24.2 sec;   INR: 2.15          PTT - ( 21 Mar 2018 07:13 )  PTT:77.7 sec      RADIOLOGY & ADDITIONAL STUDIES:

## 2018-03-21 NOTE — PROGRESS NOTE ADULT - PROBLEM SELECTOR PLAN 4
Ca and Ph WNL.   -please check intact PTH, Vitamin D 25 and Vitamin D 1,25 level   -low phos/renal diet

## 2018-03-22 DIAGNOSIS — R63.8 OTHER SYMPTOMS AND SIGNS CONCERNING FOOD AND FLUID INTAKE: ICD-10-CM

## 2018-03-22 DIAGNOSIS — N17.9 ACUTE KIDNEY FAILURE, UNSPECIFIED: ICD-10-CM

## 2018-03-22 DIAGNOSIS — R57.0 CARDIOGENIC SHOCK: ICD-10-CM

## 2018-03-22 DIAGNOSIS — I48.91 UNSPECIFIED ATRIAL FIBRILLATION: ICD-10-CM

## 2018-03-22 DIAGNOSIS — I50.23 ACUTE ON CHRONIC SYSTOLIC (CONGESTIVE) HEART FAILURE: ICD-10-CM

## 2018-03-22 DIAGNOSIS — D69.6 THROMBOCYTOPENIA, UNSPECIFIED: ICD-10-CM

## 2018-03-22 DIAGNOSIS — Z29.9 ENCOUNTER FOR PROPHYLACTIC MEASURES, UNSPECIFIED: ICD-10-CM

## 2018-03-22 DIAGNOSIS — D64.9 ANEMIA, UNSPECIFIED: ICD-10-CM

## 2018-03-22 LAB
ALBUMIN SERPL ELPH-MCNC: 3.1 G/DL — LOW (ref 3.3–5)
ALP SERPL-CCNC: 104 U/L — SIGNIFICANT CHANGE UP (ref 40–120)
ALT FLD-CCNC: 24 U/L — SIGNIFICANT CHANGE UP (ref 10–45)
AMMONIA BLD-MCNC: 12 UMOL/L — SIGNIFICANT CHANGE UP (ref 11–55)
ANION GAP SERPL CALC-SCNC: 13 MMOL/L — SIGNIFICANT CHANGE UP (ref 5–17)
ANION GAP SERPL CALC-SCNC: 17 MMOL/L — SIGNIFICANT CHANGE UP (ref 5–17)
ANION GAP SERPL CALC-SCNC: 19 MMOL/L — HIGH (ref 5–17)
ANION GAP SERPL CALC-SCNC: 9 MMOL/L — SIGNIFICANT CHANGE UP (ref 5–17)
APTT BLD: 48.9 SEC — HIGH (ref 27.5–37.4)
APTT BLD: 64 SEC — HIGH (ref 27.5–37.4)
AST SERPL-CCNC: 26 U/L — SIGNIFICANT CHANGE UP (ref 10–40)
BASE EXCESS BLDA CALC-SCNC: -1.1 MMOL/L — SIGNIFICANT CHANGE UP (ref -2–3)
BASE EXCESS BLDA CALC-SCNC: -4 MMOL/L — LOW (ref -2–3)
BILIRUB SERPL-MCNC: 2.5 MG/DL — HIGH (ref 0.2–1.2)
BUN SERPL-MCNC: 29 MG/DL — HIGH (ref 7–23)
BUN SERPL-MCNC: 29 MG/DL — HIGH (ref 7–23)
BUN SERPL-MCNC: 32 MG/DL — HIGH (ref 7–23)
BUN SERPL-MCNC: 34 MG/DL — HIGH (ref 7–23)
CALCIUM SERPL-MCNC: 8.4 MG/DL — SIGNIFICANT CHANGE UP (ref 8.4–10.5)
CALCIUM SERPL-MCNC: 8.7 MG/DL — SIGNIFICANT CHANGE UP (ref 8.4–10.5)
CALCIUM SERPL-MCNC: 8.8 MG/DL — SIGNIFICANT CHANGE UP (ref 8.4–10.5)
CALCIUM SERPL-MCNC: 8.8 MG/DL — SIGNIFICANT CHANGE UP (ref 8.4–10.5)
CALCIUM SERPL-MCNC: 9 MG/DL — SIGNIFICANT CHANGE UP (ref 8.4–10.5)
CALCIUM SERPL-MCNC: 9.5 MG/DL — SIGNIFICANT CHANGE UP (ref 8.4–10.5)
CHLORIDE SERPL-SCNC: 100 MMOL/L — SIGNIFICANT CHANGE UP (ref 96–108)
CHLORIDE SERPL-SCNC: 98 MMOL/L — SIGNIFICANT CHANGE UP (ref 96–108)
CHLORIDE SERPL-SCNC: 99 MMOL/L — SIGNIFICANT CHANGE UP (ref 96–108)
CHLORIDE SERPL-SCNC: 99 MMOL/L — SIGNIFICANT CHANGE UP (ref 96–108)
CO2 SERPL-SCNC: 20 MMOL/L — LOW (ref 22–31)
CO2 SERPL-SCNC: 20 MMOL/L — LOW (ref 22–31)
CO2 SERPL-SCNC: 26 MMOL/L — SIGNIFICANT CHANGE UP (ref 22–31)
CO2 SERPL-SCNC: 27 MMOL/L — SIGNIFICANT CHANGE UP (ref 22–31)
CREAT SERPL-MCNC: 2 MG/DL — HIGH (ref 0.5–1.3)
CREAT SERPL-MCNC: 2.06 MG/DL — HIGH (ref 0.5–1.3)
CREAT SERPL-MCNC: 2.14 MG/DL — HIGH (ref 0.5–1.3)
CREAT SERPL-MCNC: 2.28 MG/DL — HIGH (ref 0.5–1.3)
GAS PNL BLDA: SIGNIFICANT CHANGE UP
GAS PNL BLDA: SIGNIFICANT CHANGE UP
GLUCOSE BLDC GLUCOMTR-MCNC: 104 MG/DL — HIGH (ref 70–99)
GLUCOSE BLDC GLUCOMTR-MCNC: 74 MG/DL — SIGNIFICANT CHANGE UP (ref 70–99)
GLUCOSE BLDC GLUCOMTR-MCNC: 78 MG/DL — SIGNIFICANT CHANGE UP (ref 70–99)
GLUCOSE BLDC GLUCOMTR-MCNC: 89 MG/DL — SIGNIFICANT CHANGE UP (ref 70–99)
GLUCOSE BLDC GLUCOMTR-MCNC: 94 MG/DL — SIGNIFICANT CHANGE UP (ref 70–99)
GLUCOSE BLDC GLUCOMTR-MCNC: 96 MG/DL — SIGNIFICANT CHANGE UP (ref 70–99)
GLUCOSE SERPL-MCNC: 109 MG/DL — HIGH (ref 70–99)
GLUCOSE SERPL-MCNC: 129 MG/DL — HIGH (ref 70–99)
GLUCOSE SERPL-MCNC: 142 MG/DL — HIGH (ref 70–99)
GLUCOSE SERPL-MCNC: 75 MG/DL — SIGNIFICANT CHANGE UP (ref 70–99)
HCO3 BLDA-SCNC: 21 MMOL/L — SIGNIFICANT CHANGE UP (ref 21–28)
HCO3 BLDA-SCNC: 22 MMOL/L — SIGNIFICANT CHANGE UP (ref 21–28)
HCT VFR BLD CALC: 28.6 % — LOW (ref 39–50)
HCT VFR BLD CALC: 28.8 % — LOW (ref 39–50)
HCT VFR BLD CALC: 29.5 % — LOW (ref 39–50)
HCT VFR BLD CALC: 30.4 % — LOW (ref 39–50)
HGB BLD-MCNC: 8.8 G/DL — LOW (ref 13–17)
HGB BLD-MCNC: 9.1 G/DL — LOW (ref 13–17)
HGB BLD-MCNC: 9.1 G/DL — LOW (ref 13–17)
HGB BLD-MCNC: 9.6 G/DL — LOW (ref 13–17)
INR BLD: 2.12 — HIGH (ref 0.88–1.16)
INR BLD: 2.17 — HIGH (ref 0.88–1.16)
LACTATE SERPL-SCNC: 3.6 MMOL/L — HIGH (ref 0.5–2)
LACTATE SERPL-SCNC: 3.9 MMOL/L — HIGH (ref 0.5–2)
MAGNESIUM SERPL-MCNC: 1.7 MG/DL — SIGNIFICANT CHANGE UP (ref 1.6–2.6)
MAGNESIUM SERPL-MCNC: 1.8 MG/DL — SIGNIFICANT CHANGE UP (ref 1.6–2.6)
MCHC RBC-ENTMCNC: 29.6 PG — SIGNIFICANT CHANGE UP (ref 27–34)
MCHC RBC-ENTMCNC: 29.7 PG — SIGNIFICANT CHANGE UP (ref 27–34)
MCHC RBC-ENTMCNC: 30.1 PG — SIGNIFICANT CHANGE UP (ref 27–34)
MCHC RBC-ENTMCNC: 30.1 PG — SIGNIFICANT CHANGE UP (ref 27–34)
MCHC RBC-ENTMCNC: 30.8 G/DL — LOW (ref 32–36)
MCHC RBC-ENTMCNC: 30.8 G/DL — LOW (ref 32–36)
MCHC RBC-ENTMCNC: 31.6 G/DL — LOW (ref 32–36)
MCHC RBC-ENTMCNC: 31.6 G/DL — LOW (ref 32–36)
MCV RBC AUTO: 95.3 FL — SIGNIFICANT CHANGE UP (ref 80–100)
MCV RBC AUTO: 95.4 FL — SIGNIFICANT CHANGE UP (ref 80–100)
MCV RBC AUTO: 96.3 FL — SIGNIFICANT CHANGE UP (ref 80–100)
MCV RBC AUTO: 96.4 FL — SIGNIFICANT CHANGE UP (ref 80–100)
PCO2 BLDA: 33 MMHG — LOW (ref 35–48)
PCO2 BLDA: 36 MMHG — SIGNIFICANT CHANGE UP (ref 35–48)
PH BLDA: 7.38 — SIGNIFICANT CHANGE UP (ref 7.35–7.45)
PH BLDA: 7.45 — SIGNIFICANT CHANGE UP (ref 7.35–7.45)
PHOSPHATE SERPL-MCNC: 3.5 MG/DL — SIGNIFICANT CHANGE UP (ref 2.5–4.5)
PHOSPHATE SERPL-MCNC: 3.6 MG/DL — SIGNIFICANT CHANGE UP (ref 2.5–4.5)
PLATELET # BLD AUTO: 100 K/UL — LOW (ref 150–400)
PLATELET # BLD AUTO: 110 K/UL — LOW (ref 150–400)
PLATELET # BLD AUTO: 87 K/UL — LOW (ref 150–400)
PLATELET # BLD AUTO: 89 K/UL — LOW (ref 150–400)
PO2 BLDA: 113 MMHG — HIGH (ref 83–108)
PO2 BLDA: 91 MMHG — SIGNIFICANT CHANGE UP (ref 83–108)
POTASSIUM SERPL-MCNC: 3.3 MMOL/L — LOW (ref 3.5–5.3)
POTASSIUM SERPL-MCNC: 3.4 MMOL/L — LOW (ref 3.5–5.3)
POTASSIUM SERPL-MCNC: 3.4 MMOL/L — LOW (ref 3.5–5.3)
POTASSIUM SERPL-MCNC: 3.6 MMOL/L — SIGNIFICANT CHANGE UP (ref 3.5–5.3)
POTASSIUM SERPL-SCNC: 3.3 MMOL/L — LOW (ref 3.5–5.3)
POTASSIUM SERPL-SCNC: 3.4 MMOL/L — LOW (ref 3.5–5.3)
POTASSIUM SERPL-SCNC: 3.4 MMOL/L — LOW (ref 3.5–5.3)
POTASSIUM SERPL-SCNC: 3.6 MMOL/L — SIGNIFICANT CHANGE UP (ref 3.5–5.3)
PROT SERPL-MCNC: 5.6 G/DL — LOW (ref 6–8.3)
PROTHROM AB SERPL-ACNC: 23.9 SEC — HIGH (ref 9.8–12.7)
PROTHROM AB SERPL-ACNC: 24.5 SEC — HIGH (ref 9.8–12.7)
PTH-INTACT FLD-MCNC: 246 PG/ML — HIGH (ref 15–65)
PTH-INTACT FLD-MCNC: 498 PG/ML — HIGH (ref 15–65)
RBC # BLD: 2.97 M/UL — LOW (ref 4.2–5.8)
RBC # BLD: 3.02 M/UL — LOW (ref 4.2–5.8)
RBC # BLD: 3.06 M/UL — LOW (ref 4.2–5.8)
RBC # BLD: 3.19 M/UL — LOW (ref 4.2–5.8)
RBC # FLD: 18.3 % — HIGH (ref 10.3–16.9)
RBC # FLD: 18.3 % — HIGH (ref 10.3–16.9)
RBC # FLD: 18.4 % — HIGH (ref 10.3–16.9)
RBC # FLD: 18.7 % — HIGH (ref 10.3–16.9)
SAO2 % BLDA: 97 % — SIGNIFICANT CHANGE UP (ref 95–100)
SAO2 % BLDA: 98 % — SIGNIFICANT CHANGE UP (ref 95–100)
SODIUM SERPL-SCNC: 135 MMOL/L — SIGNIFICANT CHANGE UP (ref 135–145)
SODIUM SERPL-SCNC: 136 MMOL/L — SIGNIFICANT CHANGE UP (ref 135–145)
SODIUM SERPL-SCNC: 138 MMOL/L — SIGNIFICANT CHANGE UP (ref 135–145)
SODIUM SERPL-SCNC: 138 MMOL/L — SIGNIFICANT CHANGE UP (ref 135–145)
WBC # BLD: 10.3 K/UL — SIGNIFICANT CHANGE UP (ref 3.8–10.5)
WBC # BLD: 11.6 K/UL — HIGH (ref 3.8–10.5)
WBC # BLD: 13.6 K/UL — HIGH (ref 3.8–10.5)
WBC # BLD: 14.6 K/UL — HIGH (ref 3.8–10.5)
WBC # FLD AUTO: 10.3 K/UL — SIGNIFICANT CHANGE UP (ref 3.8–10.5)
WBC # FLD AUTO: 11.6 K/UL — HIGH (ref 3.8–10.5)
WBC # FLD AUTO: 13.6 K/UL — HIGH (ref 3.8–10.5)
WBC # FLD AUTO: 14.6 K/UL — HIGH (ref 3.8–10.5)

## 2018-03-22 PROCEDURE — 70498 CT ANGIOGRAPHY NECK: CPT | Mod: 26

## 2018-03-22 PROCEDURE — 99255 IP/OBS CONSLTJ NEW/EST HI 80: CPT

## 2018-03-22 PROCEDURE — 99233 SBSQ HOSP IP/OBS HIGH 50: CPT | Mod: GC

## 2018-03-22 PROCEDURE — 70496 CT ANGIOGRAPHY HEAD: CPT | Mod: 26

## 2018-03-22 PROCEDURE — 90945 DIALYSIS ONE EVALUATION: CPT

## 2018-03-22 PROCEDURE — 71045 X-RAY EXAM CHEST 1 VIEW: CPT | Mod: 26,76

## 2018-03-22 PROCEDURE — 99233 SBSQ HOSP IP/OBS HIGH 50: CPT

## 2018-03-22 PROCEDURE — 95951: CPT | Mod: 26,52

## 2018-03-22 RX ORDER — LEVETIRACETAM 250 MG/1
1000 TABLET, FILM COATED ORAL ONCE
Qty: 0 | Refills: 0 | Status: DISCONTINUED | OUTPATIENT
Start: 2018-03-22 | End: 2018-03-22

## 2018-03-22 RX ORDER — PROPOFOL 10 MG/ML
0.01 INJECTION, EMULSION INTRAVENOUS
Qty: 1000 | Refills: 0 | Status: DISCONTINUED | OUTPATIENT
Start: 2018-03-22 | End: 2018-03-26

## 2018-03-22 RX ORDER — INSULIN GLARGINE 100 [IU]/ML
5 INJECTION, SOLUTION SUBCUTANEOUS AT BEDTIME
Qty: 0 | Refills: 0 | Status: DISCONTINUED | OUTPATIENT
Start: 2018-03-22 | End: 2018-03-23

## 2018-03-22 RX ORDER — SODIUM CHLORIDE 9 MG/ML
500 INJECTION INTRAMUSCULAR; INTRAVENOUS; SUBCUTANEOUS ONCE
Qty: 0 | Refills: 0 | Status: COMPLETED | OUTPATIENT
Start: 2018-03-22 | End: 2018-03-22

## 2018-03-22 RX ORDER — WARFARIN SODIUM 2.5 MG/1
3 TABLET ORAL ONCE
Qty: 0 | Refills: 0 | Status: DISCONTINUED | OUTPATIENT
Start: 2018-03-22 | End: 2018-03-23

## 2018-03-22 RX ORDER — HYDROCORTISONE 20 MG
25 TABLET ORAL DAILY
Qty: 0 | Refills: 0 | Status: DISCONTINUED | OUTPATIENT
Start: 2018-03-23 | End: 2018-03-23

## 2018-03-22 RX ORDER — NOREPINEPHRINE BITARTRATE/D5W 8 MG/250ML
0.01 PLASTIC BAG, INJECTION (ML) INTRAVENOUS
Qty: 8 | Refills: 0 | Status: DISCONTINUED | OUTPATIENT
Start: 2018-03-22 | End: 2018-03-27

## 2018-03-22 RX ADMIN — Medication 1 MILLIGRAM(S): at 12:17

## 2018-03-22 RX ADMIN — Medication 81 MILLIGRAM(S): at 12:17

## 2018-03-22 RX ADMIN — PROPOFOL 0.01 MICROGRAM(S)/KG/MIN: 10 INJECTION, EMULSION INTRAVENOUS at 22:15

## 2018-03-22 RX ADMIN — ISOSORBIDE DINITRATE 5 MILLIGRAM(S): 5 TABLET ORAL at 06:52

## 2018-03-22 RX ADMIN — Medication 1 TABLET(S): at 12:17

## 2018-03-22 RX ADMIN — Medication 100 MILLIGRAM(S): at 12:17

## 2018-03-22 RX ADMIN — ESCITALOPRAM OXALATE 5 MILLIGRAM(S): 10 TABLET, FILM COATED ORAL at 12:19

## 2018-03-22 RX ADMIN — Medication 25 MILLIGRAM(S): at 06:52

## 2018-03-22 RX ADMIN — CHLORHEXIDINE GLUCONATE 1 APPLICATION(S): 213 SOLUTION TOPICAL at 12:16

## 2018-03-22 NOTE — PROGRESS NOTE ADULT - ATTENDING COMMENTS
Patient seen and examined with house-staff during bedside rounds.  Resident note read, including vitals, physical findings, laboratory data, and radiological reports.   Revisions included below.  Direct personal management at bed side and extensive interpretation of the data.  Plan was outlined and discussed in details with the housestaff.  Decision making of high complexity  Action taken for acute disease activity to reflect the level of care provided:  - medication reconciliation  - review laboratory data  - hemodynamically stable  - on vasopresssin  - negative fluid balance on continuos dialysis  - Tolerating PO  - No evidence of sepsis  - on anticoagulation  - discussed with cardio

## 2018-03-22 NOTE — CONSULT NOTE ADULT - ATTENDING COMMENTS
I was present in the ICU when this critically ill patient with sepsis and severe cardiomyopathy, known left ventricular clot on AC developed sudden LOC right after he had a line placed. Initially noted to have a tonic posturing event briefly and the lost brainstem signs and became unresponsive and developed agonal breathing. BP was 80-90 SBP. Pulse was hard to feel but present. Patient intubated in the ICU and taken down for a stroke CT and CTA. No bleed and no LVO. Patient not a candidate for AC due to low platelets, AC and possible sz.  Brought back to MICU and started on VEEG monitoring.

## 2018-03-22 NOTE — CHART NOTE - NSCHARTNOTEFT_GEN_A_CORE
PGY3 EVENT NOTE    Pt became unresponsive after arterial line placement. Finger stick was 98, other vital signs were stable: /63, HR 80, RR= 14, oxygen saturation 100%.   PHYSICAL EXAM:  Constitutional: unresponsive   Head: NC/AT  Eyes: pupils are round and sluggish but equally reactive bilaterally clear conjunctiva  ENT: no nasal discharge;   Neck: TLC in place, clean, dry,   Respiratory: limited air movement bilaterally  Cardiac: +S1/S2; RRR;    Gastrointestinal: soft, NT/ND; no rebound or guarding; +BSx4  Genitourinary: normal external genitalia  Extremities: WWP, no clubbing or cyanosis; no peripheral edema  Musculoskeletal: not able to assess strength patient is not moving extremities  Neurologic: AAOx0 ; not responsive to painful stimuli  Psychiatric: unable to assess     STROKE CODE WAS CALLED. Pt was intubated for airway protection. Propofol started for sedation; Levophed started to increase cerebral perfusion and to maintain goal MAP of 65. Pt taken for CT head. CT head and CTA without evidence of acute hemorrhage, stenosis or thrombus.     Labs:  Blood Gas Profile - Arterial (03.22.18 @ 17:54)    pH, Arterial: 7.45    pCO2, Arterial: 33 mmHg    pO2, Arterial: 91 mmHg    HCO3, Arterial: 22 mmol/L    Base Excess, Arterial: -1.1 mmol/L    Oxygen Saturation, Arterial: 97 %    Blood Gas Source Arterial: BLDA      Plan:    #Altered Mental Status / metabolic encephalopathy - possible etiologies include: metabolic encephalopathy vs post-ictal state vs cerebral ischemia not seen yet on ct   -will f/u EEG  -c/w levophed w/plan to titrate to goal MAP of 65  -continue with intubation and sedation with propofol at this time with plan for sedation holiday to assess mental status as soon as is appropriate  -f/u repeat labs: cbc, cmp, lactate, ammonia, abg  -f/u addition stroke neurology recs  -family was updated after the acute change in mental status, the patient's son and daughter are both aware and will be updated as information becomes available

## 2018-03-22 NOTE — PROGRESS NOTE BEHAVIORAL HEALTH - NSBHCHARTREVIEWLAB_PSY_A_CORE FT
8.8    11.6  )-----------( 87       ( 22 Mar 2018 07:01 )             28.6   03-22    138  |  99  |  34<H>  ----------------------------<  75  3.6   |  26  |  2.14<H>    Ca    8.8      22 Mar 2018 07:01  Phos  3.5     03-22  Mg     1.8     03-22
8.7    18.7  )-----------( 77       ( 19 Mar 2018 04:26 )             27.7   03-19    137  |  100  |  54<H>  ----------------------------<  215<H>  3.6   |  21<L>  |  3.24<H>    Ca    8.7      19 Mar 2018 04:26  Phos  4.0     03-19  Mg     1.9     03-19    TPro  6.6  /  Alb  3.7  /  TBili  3.0<H>  /  DBili  1.2<H>  /  AST  15  /  ALT  15  /  AlkPhos  66  03-19
8.9    17.4  )-----------( 89       ( 20 Mar 2018 06:22 )             27.9   03-20    139  |  100  |  45<H>  ----------------------------<  140<H>  3.9   |  26  |  3.00<H>    Ca    9.2      20 Mar 2018 06:22  Phos  4.0     03-20  Mg     2.0     03-20    TPro  6.1  /  Alb  x   /  TBili  x   /  DBili  x   /  AST  x   /  ALT  x   /  AlkPhos  x   03-19

## 2018-03-22 NOTE — PROGRESS NOTE ADULT - PROBLEM SELECTOR PLAN 1
- oliguric GILMER from ATN from septic shock   - On CVVHD - 50 ml/h - no issues   - we will continue with that   - volume status on the wet side   - in and outs   - daily weight   - electrolytes noted-

## 2018-03-22 NOTE — PROGRESS NOTE BEHAVIORAL HEALTH - PRIMARY DX
Delirium due to another medical condition, acute, hypoactive

## 2018-03-22 NOTE — CONSULT NOTE ADULT - SUBJECTIVE AND OBJECTIVE BOX
**STROKE CODE CONSULT NOTE**    Last known well time/Time of onset of symptoms: 5:45 pm.     HPI:  63 M PMH of HFrEF 10-15% (ischemic), s/p AICD vs PPM, possible Afib, HTN, DM2 on insulin, possible CKD, and gout, who presents with a chief complaint of generalized weakness s/p septic shock likely 2/2 LE cellulitis. Now off abx. Waiting transfer to CCU for further management of cardiogenic shock however after A-line placement, patient became unresponsive. NIH noted        PAST MEDICAL & SURGICAL HISTORY:  Type 2 diabetes mellitus with diabetic peripheral angiopathy without gangrene, with long-term curren  Essential hypertension, benign  Gout  Pacemaker  Chronic systolic heart failure  Myocardial infarction  No significant past surgical history      FAMILY HISTORY:  Non-contributory.     SOCIAL HISTORY:  denies tobacco use ever  denies alcohol use  denies drug use  Lives in an apartment on the [facility]. Has a HHA 2-3 days per week.    ROS:  Unable to assess b/c patient altered.     MEDICATIONS  (STANDING):  aspirin  chewable 81 milliGRAM(s) Oral daily  atorvastatin 80 milliGRAM(s) Oral at bedtime  bisacodyl 5 milliGRAM(s) Oral daily  chlorhexidine 2% Cloths 1 Application(s) Topical daily  dextrose 5%. 1000 milliLiter(s) (50 mL/Hr) IV Continuous <Continuous>  dextrose 50% Injectable 12.5 Gram(s) IV Push once  dextrose 50% Injectable 25 Gram(s) IV Push once  dextrose 50% Injectable 25 Gram(s) IV Push once  escitalopram 5 milliGRAM(s) Oral daily  folic acid 1 milliGRAM(s) Oral daily  insulin glargine Injectable (LANTUS) 5 Unit(s) SubCutaneous at bedtime  insulin lispro (HumaLOG) corrective regimen sliding scale   SubCutaneous Before meals and at bedtime  melatonin 3 milliGRAM(s) Oral at bedtime  multivitamin 1 Tablet(s) Oral daily  norepinephrine Infusion 0.01 MICROgram(s)/kG/Min (1.5 mL/Hr) IV Continuous <Continuous>  PureFlow Dialysate RFP-400 (K 2 / Ca 3) 5000 milliLiter(s) (2000 mL/Hr) CRRT <Continuous>  sodium chloride 0.9% Bolus 500 milliLiter(s) IV Bolus once  sodium chloride 0.9% lock flush 20 milliLiter(s) IV Push once  thiamine 100 milliGRAM(s) Oral daily  vasopressin Infusion 0.01 Unit(s)/Min (0.6 mL/Hr) IV Continuous <Continuous>  warfarin 3 milliGRAM(s) Oral once    MEDICATIONS  (PRN):  acetaminophen   Tablet. 650 milliGRAM(s) Oral every 6 hours PRN Moderate Pain (4 - 6)  dextrose Gel 1 Dose(s) Oral once PRN Blood Glucose LESS THAN 70 milliGRAM(s)/deciliter  glucagon  Injectable 1 milliGRAM(s) IntraMuscular once PRN Glucose LESS THAN 70 milligrams/deciliter  sodium chloride 0.9% lock flush 10 milliLiter(s) IV Push every 1 hour PRN After each medication administration  sodium chloride 0.9% lock flush 10 milliLiter(s) IV Push every 12 hours PRN Lumen of catheter NOT used    Allergies  heparin (Unknown)            Vital Signs Last 24 Hrs  T(C): 35.8 (22 Mar 2018 16:33), Max: 36.1 (22 Mar 2018 09:00)  T(F): 96.4 (22 Mar 2018 16:33), Max: 97 (22 Mar 2018 09:00)  HR: 98 (22 Mar 2018 18:00) (69 - 98)  BP: 67/59 (22 Mar 2018 15:00) (64/55 - 88/81)  BP(mean): 64 (22 Mar 2018 15:00) (58 - 86)  RR: 14 (22 Mar 2018 15:00) (9 - 27)  SpO2: 100% (22 Mar 2018 18:00) (96% - 100%)    PHYSICAL EXAM:  Constitutional: WDWN; NAD  Cardiovascular: RRR, no appreciable murmurs; no carotid bruits  Neurologic:  Mental status: Awake, alert and oriented x3.  Recent and remote memory intact.  Naming, repetition and comprehension intact.  Attention/concentration intact.  No dysarthria, no aphasia.  Fund of knowledge appropriate.    Cranial nerves: Fundoscopic exam demonstrated no abnormalities, pupils equally round and reactive to light, visual fields full, no nystagmus, extraocular muscles intact, V1 through V3 intact bilaterally and symmetric, face symmetric, hearing intact to finger rub, palate elevation symmetric, tongue was midline, sternocleidomastoid/shoulder shrug strength bilaterally 5/5.    Motor:  Normal bulk and tone, strength 5/5 in bilateral upper and lower extremities.   strength 5/5.  Rapid alternating movements intact and symmetric.   Sensation: Intact to light touch, proprioception, vibration, temperature, pinprick.  No neglect.   Coordination: No dysmetria on finger-to-nose and heel-to-shin.  No clumsiness.  Reflexes: 2+ in upper and lower extremities, absent Babinski bilaterally  Gait: Narrow and steady. No ataxia.  Romberg negative    NIHSS:    Fingerstick Blood Glucose: CAPILLARY BLOOD GLUCOSE  98 (22 Mar 2018 18:01)      POCT Blood Glucose.: 94 mg/dL (22 Mar 2018 17:41)       LABS:                        9.1    10.3  )-----------( 89       ( 22 Mar 2018 16:19 )             29.5     03-22    136  |  100  |  29<H>  ----------------------------<  129<H>  3.3<L>   |  27  |  2.00<H>    Ca    8.7      22 Mar 2018 16:19  Phos  3.5     03-22  Mg     1.8     03-22      PT/INR - ( 22 Mar 2018 07:01 )   PT: 23.9 sec;   INR: 2.12          PTT - ( 22 Mar 2018 07:01 )  PTT:64.0 sec          RADIOLOGY & ADDITIONAL STUDIES:    IV-tPA (Y/N):                                   Bolus time:  Reason IV-tPA not given:    ASSESSMENT/PLAN: **STROKE CODE CONSULT NOTE**    Last known well time/Time of onset of symptoms: 5:45 pm.     HPI:  In brief, 63 M PMH of HFrEF 10-15% (ischemic), s/p AICD vs PPM, possible Afib, HTN, DM2 on insulin, possible CKD, and gout, who presents with a chief complaint of generalized weakness s/p septic shock likely 2/2 LE cellulitis. Now off abx. Waiting transfer to CCU for further management of cardiogenic shock however after A-line placement, patient became unresponsive. Stroke code called. Levophed started to help with cerebral perfusion. Patient intubated for airway protection. NIH noted to be 39. Patient comatose and not awaking to sternal rub. CTH showing no acute process. CTA head and neck without stenotic lesions. Images reviewed by Dr. Rose. Patient transferred back to MICU for management of AMS.    PAST MEDICAL & SURGICAL HISTORY:  Type 2 diabetes mellitus with diabetic peripheral angiopathy without gangrene, with long-term curren  Essential hypertension, benign  Gout  Pacemaker  Chronic systolic heart failure  Myocardial infarction  No significant past surgical history    FAMILY HISTORY:  Non-contributory.     SOCIAL HISTORY:  denies tobacco use ever  denies alcohol use  denies drug use  Lives in an apartment on the [facility]. Has a HHA 2-3 days per week.    ROS:  Unable to assess b/c patient altered.     MEDICATIONS  (STANDING):  aspirin  chewable 81 milliGRAM(s) Oral daily  atorvastatin 80 milliGRAM(s) Oral at bedtime  bisacodyl 5 milliGRAM(s) Oral daily  chlorhexidine 2% Cloths 1 Application(s) Topical daily  dextrose 5%. 1000 milliLiter(s) (50 mL/Hr) IV Continuous <Continuous>  dextrose 50% Injectable 12.5 Gram(s) IV Push once  dextrose 50% Injectable 25 Gram(s) IV Push once  dextrose 50% Injectable 25 Gram(s) IV Push once  escitalopram 5 milliGRAM(s) Oral daily  folic acid 1 milliGRAM(s) Oral daily  insulin glargine Injectable (LANTUS) 5 Unit(s) SubCutaneous at bedtime  insulin lispro (HumaLOG) corrective regimen sliding scale   SubCutaneous Before meals and at bedtime  melatonin 3 milliGRAM(s) Oral at bedtime  multivitamin 1 Tablet(s) Oral daily  norepinephrine Infusion 0.01 MICROgram(s)/kG/Min (1.5 mL/Hr) IV Continuous <Continuous>  PureFlow Dialysate RFP-400 (K 2 / Ca 3) 5000 milliLiter(s) (2000 mL/Hr) CRRT <Continuous>  sodium chloride 0.9% Bolus 500 milliLiter(s) IV Bolus once  sodium chloride 0.9% lock flush 20 milliLiter(s) IV Push once  thiamine 100 milliGRAM(s) Oral daily  vasopressin Infusion 0.01 Unit(s)/Min (0.6 mL/Hr) IV Continuous <Continuous>  warfarin 3 milliGRAM(s) Oral once    MEDICATIONS  (PRN):  acetaminophen   Tablet. 650 milliGRAM(s) Oral every 6 hours PRN Moderate Pain (4 - 6)  dextrose Gel 1 Dose(s) Oral once PRN Blood Glucose LESS THAN 70 milliGRAM(s)/deciliter  glucagon  Injectable 1 milliGRAM(s) IntraMuscular once PRN Glucose LESS THAN 70 milligrams/deciliter  sodium chloride 0.9% lock flush 10 milliLiter(s) IV Push every 1 hour PRN After each medication administration  sodium chloride 0.9% lock flush 10 milliLiter(s) IV Push every 12 hours PRN Lumen of catheter NOT used    Allergies  heparin (Unknown)    Vital Signs Last 24 Hrs  T(C): 35.8 (22 Mar 2018 16:33), Max: 36.1 (22 Mar 2018 09:00)  T(F): 96.4 (22 Mar 2018 16:33), Max: 97 (22 Mar 2018 09:00)  HR: 98 (22 Mar 2018 18:00) (69 - 98)  BP: 67/59 (22 Mar 2018 15:00) (64/55 - 88/81)  BP(mean): 64 (22 Mar 2018 15:00) (58 - 86)  RR: 14 (22 Mar 2018 15:00) (9 - 27)  SpO2: 100% (22 Mar 2018 18:00) (96% - 100%)    PHYSICAL EXAM:  Constitutional:  comatose.   Cardiovascular: RRR. 2/6 systolic murmur.   Neurologic:  Mental status:  comatose. Not responsive. Not awakening with sternal rub.  Cranial nerves: possible doll eyes noted initially. No corneal reflex. However patient with gag reflex during intubation.   Motor:  Normal bulk and tone. Extremities non-rigid.  Sensation:  unable to assess.  Coordination: unable to assess.  Reflexes: 2+ in upper and lower extremities. Babinski sign equivocal b/l.   Gait: unable to assess.    NIHSS: 39    Fingerstick Blood Glucose: CAPILLARY BLOOD GLUCOSE  98 (22 Mar 2018 18:01)    POCT Blood Glucose.: 94 mg/dL (22 Mar 2018 17:41)    LABS:                        9.1    10.3  )-----------( 89       ( 22 Mar 2018 16:19 )             29.5     03-22    136  |  100  |  29<H>  ----------------------------<  129<H>  3.3<L>   |  27  |  2.00<H>    Ca    8.7      22 Mar 2018 16:19  Phos  3.5     03-22  Mg     1.8     03-22    PT/INR - ( 22 Mar 2018 07:01 )   PT: 23.9 sec;   INR: 2.12        PTT - ( 22 Mar 2018 07:01 )  PTT:64.0 sec    RADIOLOGY & ADDITIONAL STUDIES:    IV-tPA (Y/N):  N                       Reason IV-tPA not given:  elevated INR. No CVA.     ASSESSMENT/PLAN:    63 M PMH of HFrEF 10-15% (ischemic), s/p AICD vs PPM, possible Afib, HTN, DM2 on insulin, possible CKD, and gout, who presents with a chief complaint of generalized weakness s/p septic shock likely 2/2 LE cellulitis. Now with acute toxic metabolic encephalopathy. CVA unlikely.    #Toxic Metabolic Encephalopathy -- May be from hypoperfusion from cardiogenic shock. Possibly recurrent sepsis as well. Seizure is possible. Lactic acid noted to be elevated. No recent sedating medications given. No CVA or ICH seen on imaging.   -CBC, CMP, Mg, P, troponin, CK total.   -Trend lactate q4hrs.  -EKG   -Please place VEEG on patient. HOLD keppra at this point in time as to not suppress seizure like activity.   -Serial neuro exams per unit protocol.   -DISPO --> will remain in MICU.

## 2018-03-22 NOTE — PROCEDURE NOTE - NSTRACHPOSTINTU_RESP_A_CORE
Breath sounds bilateral/Appropriate capnography/Breath sounds equal/Chest excursion noted/Chest X-Ray

## 2018-03-22 NOTE — STROKE CODE NOTE - NIH STROKE SCALE: 4. FACIAL PALSY, QM
(0) Normal symmetrical movements (3) Complete paralysis of one or both sides (absence of facial movement in the upper and lower face)

## 2018-03-22 NOTE — PROGRESS NOTE ADULT - ASSESSMENT
63M PMH HFrEF 10-15% (ischemic), MI, s/p AICD vs PPM, possible Afib, HTN, DM2 on insulin, possible CKD, and gout, who presents with a chief complaint of generalized weakness s/p septic shock likely 2/2 LE cellulitis. Waiting transfer to CCU for further management of cardiogenic shock       NEURO  #Toxic metabolic encephalopathy  - significant improvement in mentation compared to last week.   - Improving as sepsis and uremia resolves     ID- Resolved septic shock 2/2 Cellulitis   - Improving WBC count. Pt hypothermic this AM possibly due to CVVHD infusion however will repeat blood cultures  - Tapering steroids down to 25mg q24. Was on 50 q6h 3/12-3/17, 50q8 3/18, 50 q12 3/19-3/20, 25q12 on 3/21). recent hx of Steroid administration 2 months ago  -. s/p Zosyn (3/11-2/21). Was previously also on Vanco (3/11-3/17). Reconsider abx if patient   - Will obtain RUQ dopper to rule out portal vein thrombosis   - TTE with no vegetations. Pt not stable for RUKHSANA   - Gallium scan read shows LLE cellulitis. ,     - repeat LE CT does not show abscess or gas.   - R lung Effusion likely from overload. s/p thoracentesis with improvement in resp status   -  HIV negative  - Vascular surgery on board. Recommending Amputation as an option if pt continues to be unstable.     CARDIOVASCULAR   # HYPOTENSION   - 2/2 cardiogenic shock in setting of HFrEF   - CHF team recs appreciated. WIll d/c Midodrine and have started on Vasopressin drip. Will maintain SBP>90   -  Will continue CVVHD to help reduce CVP   - ransfer to CCU  - Septic shock resolved. Was previously on pressors and dobutamine.   - Slowly downtrending WBC count, continue to monitor CBC  - Gallium scan with only LLE cellulitis   - Lactate downtrended to WNL   - CHF with severely reduced EF 15%, caution with fluids. Per renal recs, can give IV albumin for fluids.  - Switch to intermittent HD given improved BP    # CHF exacerbation  - CHF with EF 10-15% per pt 2/2 ischemic cardiomyopathy s/p AICD as indicated by CXR   - Elevated BNP on admission with R sided effusion and edema  - Cardiac shock causing hypotension   - Started on vasopressin per CHF team recs. Now with minimal improvement in BP  - Persistent pulm congestion noted on chest Xray. c/w CVVHD  - c./w Hydralazine 5mg  q6h and Isordil 5mg tid as tolerated   - Give fluids judiciously given low EF in setting of likely sepsis  - Unclear medical regimen opt. Per [facility] ( and [facility]) pt has not picked up Torsemide 20 or Metoprolol 100 since November.   - Hold ACE/Metoprolol in setting of sepsis  - CVO saturation improved after transfusion    #AFIB  - PT with hx of Afib and LV thrombus on coumadin.    -d/c  Hep drip given concern of HIT.d/c Argatroban.  - Dose coumadin daily     - Pt s/p FFP (3/13,3/14) and Vit K (3/13) for thoracocentesis and HD catheter placement.        #CAD- Hx of MI s/p AICD  - Pt with pLAD in 7/2017, was started on Aspirin and Brelinta per records.  - Will start on asp 81, Atorvastatin 80   - c/w Coumadin     # LE Edema   - LE edema with chronic venous stasis.   - Duplex does not show DVT    PULMONARY   #Acute resp failure- Resolved   - pt with R loculated plural effusion noted to have increased work of breathing. Likely 2/2 fluid overload, Fluid studies consistent with exudative effusion.   - s/p thoracentesis on 3/13 with R chest tube placement. Chest tube removed 3/15.   - On 2L NC saturating well.   - Tapered Solucortef to 25 mg q24 today. .   - s/p  pRBC transfusion on 3/12. 3/16  - Monitor resp status  - Appreciate CHF team recs regarding fluid status. Continue dialysis for fluid removal      #RENAL   #GILMER- 2/2 ischemic ATN  -Unknown baseline Cr presenting with Cr 3.93 with metabolic derangements.   - Likely 2/2 Sepsis, low intravascular volume and CHF  - Trend BUN and Cr.  - Renal team on board, pt with R HD catheter placed by IR on 3/21. c/w CVVHD currently removing 50cc/hour   - CT abdomen and pelvis without acute obstruction.   - retroperitoneal ultrasound showing medical renal disease.     #Hyperkalemia   - PT with elevated K to 5.7 on admission,  no EKG changes   - Continue telemetry monitoring  - Trend K   - Can given Kayexalate if persists   - c/w dialysis     #Metabolic acidosis   - 2/2 Lactate, starvation ketoacidosis and uremia   - Improved      #GI   - pt wit Elevated Bilirubin and Alk phos. Abd exam benign  - CT abdomen/pelvis consistent with cholelithiasis and ascites.     #HEME    #Thrombocytopenia  - ELVIRA negative but PF4 was positive. Unlikely HIT.    - Could have be related to Vanc/zosyn. Trend platelet count now that both are discontinued.   - Sepsis also in differential.     # elevated INR. Unclear etiology. Pt on coumadin?   - Continue with daily coumadin dosing   - Given Vit K and FFP3/12. FFP 3/13   - Monitor coags    #Anemia  - Stable at 8-9. Anemia of chronic disease.  -s/p 1pRBC on 3/12, 3/16   - WIll keep Hb around 9-10     #ENDOCRINE   - S/p insulin drip  - Decreasing Lantus to 5U as we taper down steroids. Monitor blood glucose and ISS coverage. pt with episodes of hypoglycemia yesterday.   - Monitor blood glucose   - A1C 8.6%      F: No IVF   E: Replete K<4 Mg<2, caution in setting of acute renal failure  N: Renal Diet     Lines:  R HD catheter (3/21). L IJ 3/21, PICC line 3/20    Full code   Dispo: MICU  Ppx: Coumadin

## 2018-03-22 NOTE — STROKE CODE NOTE - ABSOLUTE EXCLUSION OTHER CRITERIA
elevated INR. No occlusive lesions seen by Dr. Rose. elevated INR. No occlusive lesions seen by Dr. Rose. Patient noted to have  tonic posturing event

## 2018-03-22 NOTE — PROGRESS NOTE ADULT - ASSESSMENT
64 yo M history of HFrEF 10-15% 2/2 ischemic cardiomyopathy, MI , s/p AICD vs PPM, ?Afib, Hypertension, Diabetes Mellitus Type 2 on insulin, CKD?and gout, who presents with a chief complaint of generalized weakness. Now treated for septic shock - off pressors and CHF exacerbation - still oligunaric, no urine output. On CVVHD - 50 ml/h, no urine output - anuric, according to the documentation - aproximately - 600 ml from the UF for 24 hours

## 2018-03-22 NOTE — PROGRESS NOTE ADULT - SUBJECTIVE AND OBJECTIVE BOX
HOSPITAL COURSE  63M PMH HFrEF 10-15% (ischemic), MI, s/p AICD vs PPM, possible Afib, HTN, DM2 on insulin, possible CKD, and gout, who presents with a chief complaint of generalized weakness was found to be in septic shock likely 2/2 LE cellulitis. Pt was started on levophed for hypotension and to help renal perfusion. Pt was also started on dobutamine given low mix venous saturation however had to be discontinued given tachycardia. Pt also experienced tachycardia on Milrinone. Pt was started on Vanco/ Zosyn and completed total 11 days of abx. PT was started on solucortef 50 q6h given recent hx of steroid use.  - Given worsening GILMER and low UO, renal was consulted. HD cath was placed and pt was started on CVVD. Pt ws transtioned to Hemodialysis however given persistent hypotension, pt was switched back to CVVHD. Pt was noted have b/l pulmonary effusion and required R chest tube placement with fluid studies showing exudative process. Given extensive cardiac hx and component of cardiogenic shock, cardiology was consulted. Pt was started on Vasopressin to improve blood pressure. Pt also stated on Isodil and Hydralazine but pt has not  been also to tolerate the medications. Pt was started  on Hep gtt for Afib but was transiently placed on Argatroban given possibility of HIT- pt is now bridged to coumadin.  . thomobocytopenic was likely 2/2 zosyn and sepsis.       started vasopressin in attempt to decrease levo req. US Abd prelim benign. US BLE prelim neg.official echo EF 15% with pHTN (54mmHg) and tricuspid regurg. R chest tube placed and 100cc fluids removed. Started on Vasopressin given tachycardia. Dosed for 6U lantus   ON: Milrinone restarted 2/2 elev lactate of 4.8 and low mixed venous but patient reacted with tachy to carlin 140s, milirinone d/c'd w/ return to 105, NE uptitrated for ionotropic effect.   3/13: change levo goal to MAP >70. Desclated from HF to NC. Legs improved.Getting FFP, palced RIJ HD catheter and CVVHD initiated. Replaced A line. 12pm vanco level 20- not dosed. A line position. Replaced in L arm. CRRT machine clotted. Restarted at 250 cc/hr.  ON: therapeutic vanc level    3/14: started slow UF 50cc/hr. Dobutamine started 0.5 for mixed venous sat 47  ON: mixed venous of 50, uptitrated dobutamine to MAP. 11 beats vtach  3/15: Vanc level of 13, dosed for 1250 Vanco. Ordered for gallium scan. Chest tube and barnes removed. Lactate wnl. ID consulted 7 pm VBG @ goal.   ON: VBG nanci taken too quickly with CVVH, retaken and improved. No intervention.   3/16: Stopped CVVHD and switched to intermittent HD. Changed zosyn to 2.25 q8h for intermittent HD. Heart failure consulted. Venous sat 50. Increased dobutamine to 4. Heparin started for subtherapeutic INR. Titrated dose. Patient initially refused HD. Psych called for eval and patient now agreeable. Palliative care also consulted. Psych deemed patient not to have capacity. Daughter arrived (HCP)- wants dialyssi and wants to try intubation/compressions if medical team thinks it will be short term and he will be able to come out of it   ON: increased agitation and pulled out own HD cath (after dialysis). Possible ICU delirium as acute change after family left. Given seroquel 12.5mg POx1.   3/17: increased lantus. Decreased dobutamine. Vanc supratherapeutic so held. Spoke to daughter, HCP, to obtain consent for HD cath placement. Agreed. Daughter would also like family meeting with palliative on monday and can be at [facility] @ 4 pm. HD cath placed and underwent dialysis   O/N:  Mixed venous 57%. Dobutamine increased to 4 MCG/kg/min. Repeat mixed venous up to 80%. PTT therapeutic. Patient found pulling at lines. B/L wrist restraints ordered.    3/18 - D/baltazar vanc, pending gallium scan and d.cing of ?zosyn. started on agrotraban 2.2 concern for HIT. 4 T score of 4, intermediate probobality ordere HIT panel /ELVIRA. started on agatroban. Decreased solumedrol to 50 q8. Nephrology not want to dialyse today. d/c'd Dobutamine; Gallium scan done- left leg cellulitis. 6pm ptt 101, decreased argatroban by 25%. Increased lantus to 20U for tonight   ON: ELSIE   3/19: Tapered hydrcortoisone to 50 q12. One of heparin ab came back positive. Dosed coumadin 2.5mg for tonight. We came down on the argatroban drip to .3ml/hour.  Got HD today. PICC line consult placed, may get done this evening or tomorrow for IV access. Palliative saw patient today. Gallium scan showing cellulitis in legs b/l. During HD got hypotensive to 80s/50s, so stopped taking off fluid and just did clearance. Also had about 10 beats of vtach while on dialysis. Increased lantus 20 to 22 units. D/C argotroban drip as dose needed was below limit of drip.   ON: ELSIE   3/20: Started on midodrine and asa (recent LAD stent).no Hd today. dosed for coumadin 2.5 tonight. PICC line placed, CVVHD today, TLC removed,  MElatonin added for insomina.   ON: Held hydral 2/2 low BPs  3/21: decreased steroids to 25mg q12, decreased lantus 15, started vasopressin for hypotension, blood cultures sent for hypothermia, TSh WNL, ELVIRA negative, discontinued zosyn, abd US ordered to R/O portal vein thrombosis. Ir placed R HD cath. Hypoglycemic to 60, amp of dextrose given. Cut back on lantus as hypoglycemic and poor appetite.   ON: ELSIE  3/22: intact PTH very elevated. Hypogylcemic again in AM, lantus decreased.     INTERVAL HPI/OVERNIGHT EVENTS: hydraazine held give hypotension     SUBJECTIVE: Patient seen and examined at bedside. Complains of weakness. No cough    OBJECTIVE:    VITAL SIGNS:  ICU Vital Signs Last 24 Hrs  T(C): 36.1 (22 Mar 2018 14:00), Max: 36.1 (21 Mar 2018 16:40)  T(F): 97 (22 Mar 2018 14:00), Max: 97 (22 Mar 2018 09:00)  HR: 74 (22 Mar 2018 13:50) (69 - 78)  BP: 69/56 (22 Mar 2018 13:50) (64/55 - 88/81)  BP(mean): 61 (22 Mar 2018 13:50) (58 - 86)  ABP: --  ABP(mean): --  RR: 17 (22 Mar 2018 13:50) (9 - 27)  SpO2: 100% (22 Mar 2018 13:50) (96% - 100%)        03-21 @ 07:01 - 03-22 @ 07:00  --------------------------------------------------------  IN: 48 mL / OUT: 611 mL / NET: -563 mL    03-22 @ 07:01 - 03-22 @ 14:34  --------------------------------------------------------  IN: 1.8 mL / OUT: 541 mL / NET: -539.2 mL      CAPILLARY BLOOD GLUCOSE      POCT Blood Glucose.: 89 mg/dL (22 Mar 2018 11:45)      PHYSICAL EXAM:    HEENT: NC/AT, PERRL  Neck: supple. R HD cath L IJ   Respiratory: CTA b/l   Cardiac: +S1/S2, ii/vi systolic murmur   Gastrointestinal: Soft nd/nt, no rebound or guarding. +BS  Genitourinary: Testicular edema   Extremities: 1+ edema. warm to touch.  b/l areas of venous stasis. RLE with dry dressing  Skin: no rash  MSK: no joint swelling  Vasc: 1+ DP pulses        MEDICATIONS:  MEDICATIONS  (STANDING):  aspirin  chewable 81 milliGRAM(s) Oral daily  atorvastatin 80 milliGRAM(s) Oral at bedtime  bisacodyl 5 milliGRAM(s) Oral daily  chlorhexidine 2% Cloths 1 Application(s) Topical daily  dextrose 5%. 1000 milliLiter(s) (50 mL/Hr) IV Continuous <Continuous>  dextrose 50% Injectable 12.5 Gram(s) IV Push once  dextrose 50% Injectable 25 Gram(s) IV Push once  dextrose 50% Injectable 25 Gram(s) IV Push once  escitalopram 5 milliGRAM(s) Oral daily  folic acid 1 milliGRAM(s) Oral daily  insulin glargine Injectable (LANTUS) 5 Unit(s) SubCutaneous at bedtime  insulin lispro (HumaLOG) corrective regimen sliding scale   SubCutaneous Before meals and at bedtime  melatonin 3 milliGRAM(s) Oral at bedtime  multivitamin 1 Tablet(s) Oral daily  PureFlow Dialysate RFP-400 (K 2 / Ca 3) 5000 milliLiter(s) (2000 mL/Hr) CRRT <Continuous>  sodium chloride 0.9% lock flush 20 milliLiter(s) IV Push once  thiamine 100 milliGRAM(s) Oral daily  vasopressin Infusion 0.01 Unit(s)/Min (0.6 mL/Hr) IV Continuous <Continuous>  warfarin 3 milliGRAM(s) Oral once    MEDICATIONS  (PRN):  acetaminophen   Tablet. 650 milliGRAM(s) Oral every 6 hours PRN Moderate Pain (4 - 6)  dextrose Gel 1 Dose(s) Oral once PRN Blood Glucose LESS THAN 70 milliGRAM(s)/deciliter  glucagon  Injectable 1 milliGRAM(s) IntraMuscular once PRN Glucose LESS THAN 70 milligrams/deciliter  sodium chloride 0.9% lock flush 10 milliLiter(s) IV Push every 1 hour PRN After each medication administration  sodium chloride 0.9% lock flush 10 milliLiter(s) IV Push every 12 hours PRN Lumen of catheter NOT used      ALLERGIES:  Allergies    heparin (Unknown)    Intolerances        LABS:                        8.8    11.6  )-----------( 87       ( 22 Mar 2018 07:01 )             28.6     03-22    138  |  99  |  34<H>  ----------------------------<  75  3.6   |  26  |  2.14<H>    Ca    8.8      22 Mar 2018 07:01  Phos  3.5     03-22  Mg     1.8     03-22      PT/INR - ( 22 Mar 2018 07:01 )   PT: 23.9 sec;   INR: 2.12          PTT - ( 22 Mar 2018 07:01 )  PTT:64.0 sec      RADIOLOGY & ADDITIONAL TESTS: Reviewed. HOSPITAL COURSE  63M PMH HFrEF 10-15% (ischemic), MI, s/p AICD vs PPM, possible Afib, HTN, DM2 on insulin, possible CKD, and gout, who presents with a chief complaint of generalized weakness was found to be in septic shock likely 2/2 LE cellulitis. Pt was started on levophed for hypotension and to help renal perfusion. Pt was also started on dobutamine given low mix venous saturation however had to be discontinued given tachycardia. Pt also experienced tachycardia on Milrinone. Pt was started on Vanco/ Zosyn and completed total 11 days of abx. PT was started on solucortef 50 q6h given recent hx of steroid use. Gallium did not show only LLE  cellulitis. TTe with no vegetations. TTE could not be performed.   - Given worsening GILMER and low UO, renal was consulted. HD cath was placed and pt was started on CVVD. Pt ws transtioned to Hemodialysis however given persistent hypotension, pt was switched back to CVVHD. Pt was noted have b/l pulmonary effusion and required R chest tube placement with fluid studies showing exudative process. Given extensive cardiac hx and component of cardiogenic shock, cardiology was consulted. Pt was started on Vasopressin to improve blood pressure. Pt also stated on Isodil and Hydralazine but pt has not  been also to tolerate the medications. Pt was started  on Hep gtt for Afib but was transiently placed on Argatroban given possibility of HIT- pt is now bridged to coumadin.  . thomobocytopenic was likely 2/2 zosyn and sepsis.   Pt to be transferred to CCU for further management of Cardiogenic shock      INTERVAL HPI/OVERNIGHT EVENTS: hydralazine held give hypotension     SUBJECTIVE: Patient seen and examined at bedside. Complains of weakness. No cough    OBJECTIVE:    VITAL SIGNS:  ICU Vital Signs Last 24 Hrs  T(C): 36.1 (22 Mar 2018 14:00), Max: 36.1 (21 Mar 2018 16:40)  T(F): 97 (22 Mar 2018 14:00), Max: 97 (22 Mar 2018 09:00)  HR: 74 (22 Mar 2018 13:50) (69 - 78)  BP: 69/56 (22 Mar 2018 13:50) (64/55 - 88/81)  BP(mean): 61 (22 Mar 2018 13:50) (58 - 86)  ABP: --  ABP(mean): --  RR: 17 (22 Mar 2018 13:50) (9 - 27)  SpO2: 100% (22 Mar 2018 13:50) (96% - 100%)        03-21 @ 07:01  -  03-22 @ 07:00  --------------------------------------------------------  IN: 48 mL / OUT: 611 mL / NET: -563 mL    03-22 @ 07:01  - 03-22 @ 14:34  --------------------------------------------------------  IN: 1.8 mL / OUT: 541 mL / NET: -539.2 mL      CAPILLARY BLOOD GLUCOSE      POCT Blood Glucose.: 89 mg/dL (22 Mar 2018 11:45)      PHYSICAL EXAM:    HEENT: NC/AT, PERRL  Neck: supple. R HD cath L IJ   Respiratory: CTA b/l   Cardiac: +S1/S2, ii/vi systolic murmur   Gastrointestinal: Soft nd/nt, no rebound or guarding. +BS  Genitourinary: Testicular edema   Extremities: 1+ edema. warm to touch.  b/l areas of venous stasis. RLE with dry dressing  Skin: no rash  MSK: no joint swelling  Vasc: 1+ DP pulses        MEDICATIONS:  MEDICATIONS  (STANDING):  aspirin  chewable 81 milliGRAM(s) Oral daily  atorvastatin 80 milliGRAM(s) Oral at bedtime  bisacodyl 5 milliGRAM(s) Oral daily  chlorhexidine 2% Cloths 1 Application(s) Topical daily  dextrose 5%. 1000 milliLiter(s) (50 mL/Hr) IV Continuous <Continuous>  dextrose 50% Injectable 12.5 Gram(s) IV Push once  dextrose 50% Injectable 25 Gram(s) IV Push once  dextrose 50% Injectable 25 Gram(s) IV Push once  escitalopram 5 milliGRAM(s) Oral daily  folic acid 1 milliGRAM(s) Oral daily  insulin glargine Injectable (LANTUS) 5 Unit(s) SubCutaneous at bedtime  insulin lispro (HumaLOG) corrective regimen sliding scale   SubCutaneous Before meals and at bedtime  melatonin 3 milliGRAM(s) Oral at bedtime  multivitamin 1 Tablet(s) Oral daily  PureFlow Dialysate RFP-400 (K 2 / Ca 3) 5000 milliLiter(s) (2000 mL/Hr) CRRT <Continuous>  sodium chloride 0.9% lock flush 20 milliLiter(s) IV Push once  thiamine 100 milliGRAM(s) Oral daily  vasopressin Infusion 0.01 Unit(s)/Min (0.6 mL/Hr) IV Continuous <Continuous>  warfarin 3 milliGRAM(s) Oral once    MEDICATIONS  (PRN):  acetaminophen   Tablet. 650 milliGRAM(s) Oral every 6 hours PRN Moderate Pain (4 - 6)  dextrose Gel 1 Dose(s) Oral once PRN Blood Glucose LESS THAN 70 milliGRAM(s)/deciliter  glucagon  Injectable 1 milliGRAM(s) IntraMuscular once PRN Glucose LESS THAN 70 milligrams/deciliter  sodium chloride 0.9% lock flush 10 milliLiter(s) IV Push every 1 hour PRN After each medication administration  sodium chloride 0.9% lock flush 10 milliLiter(s) IV Push every 12 hours PRN Lumen of catheter NOT used      ALLERGIES:  Allergies    heparin (Unknown)    Intolerances        LABS:                        8.8    11.6  )-----------( 87       ( 22 Mar 2018 07:01 )             28.6     03-22    138  |  99  |  34<H>  ----------------------------<  75  3.6   |  26  |  2.14<H>    Ca    8.8      22 Mar 2018 07:01  Phos  3.5     03-22  Mg     1.8     03-22      PT/INR - ( 22 Mar 2018 07:01 )   PT: 23.9 sec;   INR: 2.12          PTT - ( 22 Mar 2018 07:01 )  PTT:64.0 sec      RADIOLOGY & ADDITIONAL TESTS: Reviewed.

## 2018-03-22 NOTE — PROGRESS NOTE ADULT - SUBJECTIVE AND OBJECTIVE BOX
the patient seen in the morning in his bed, no shortness of breath, no chest pain, anuric. CVVHD still in progress - UF 50 ml/h, still anuric   The renal service follows the case for GILMER in the setting of septic shocked (improved) and CHF exacerbation. Still oliguric from ATN        Patient seen and examined at bedside.     acetaminophen   Tablet. 650 milliGRAM(s) every 6 hours PRN  aspirin  chewable 81 milliGRAM(s) daily  atorvastatin 80 milliGRAM(s) at bedtime  bisacodyl 5 milliGRAM(s) daily  chlorhexidine 2% Cloths 1 Application(s) daily  dextrose 5%. 1000 milliLiter(s) <Continuous>  dextrose 50% Injectable 12.5 Gram(s) once  dextrose 50% Injectable 25 Gram(s) once  dextrose 50% Injectable 25 Gram(s) once  dextrose Gel 1 Dose(s) once PRN  escitalopram 5 milliGRAM(s) daily  folic acid 1 milliGRAM(s) daily  glucagon  Injectable 1 milliGRAM(s) once PRN  insulin glargine Injectable (LANTUS) 5 Unit(s) at bedtime  insulin lispro (HumaLOG) corrective regimen sliding scale   Before meals and at bedtime  melatonin 3 milliGRAM(s) at bedtime  multivitamin 1 Tablet(s) daily  PureFlow Dialysate RFP-400 (K 2 / Ca 3) 5000 milliLiter(s) <Continuous>  sodium chloride 0.9% lock flush 10 milliLiter(s) every 1 hour PRN  sodium chloride 0.9% lock flush 10 milliLiter(s) every 12 hours PRN  sodium chloride 0.9% lock flush 20 milliLiter(s) once  thiamine 100 milliGRAM(s) daily  vasopressin Infusion 0.01 Unit(s)/Min <Continuous>  warfarin 3 milliGRAM(s) once      Allergies    heparin (Unknown)    Intolerances        T(C): , Max: 36.1 (03-21-18 @ 16:40)  T(F): , Max: 97 (03-22-18 @ 09:00)  HR: 74 (03-22-18 @ 13:50)  BP: 69/56 (03-22-18 @ 13:50)  BP(mean): 61 (03-22-18 @ 13:50)  RR: 17 (03-22-18 @ 13:50)  SpO2: 100% (03-22-18 @ 13:50)  Wt(kg): --    03-21 @ 07:01  -  03-22 @ 07:00  --------------------------------------------------------  IN:    vasopressin Infusion: 48 mL  Total IN: 48 mL    OUT:    Other: 611 mL  Total OUT: 611 mL    Total NET: -563 mL      03-22 @ 07:01 - 03-22 @ 14:27  --------------------------------------------------------  IN:    vasopressin Infusion: 1.8 mL  Total IN: 1.8 mL    OUT:    Intermittent Catheterization - Urethral: 300 mL    Other: 241 mL  Total OUT: 541 mL    Total NET: -539.2 mL        Physical exam:   Alert and oriented   No JVD   Normal air entry into the lungs, no wheezing, no crackles   RRR, normal s1, s2, no murmurs, rubs or gallops   Abdomen - soft, not tender, not distended   Extremities: no edema   HAs a HD catheter on the right of his neck          LABS:                        8.8    11.6  )-----------( 87       ( 22 Mar 2018 07:01 )             28.6     03-22    138  |  99  |  34<H>  ----------------------------<  75  3.6   |  26  |  2.14<H>    Ca    8.8      22 Mar 2018 07:01  Phos  3.5     03-22  Mg     1.8     03-22        PT/INR - ( 22 Mar 2018 07:01 )   PT: 23.9 sec;   INR: 2.12          PTT - ( 22 Mar 2018 07:01 )  PTT:64.0 sec          RADIOLOGY & ADDITIONAL STUDIES:

## 2018-03-22 NOTE — PROGRESS NOTE BEHAVIORAL HEALTH - NSBHADMITCOUNSEL_PSY_A_CORE
importance of adherence to chosen treatment/prognosis/risks and benefits of treatment options/instructions for management, treatment and follow up/diagnostic results/impressions and/or recommended studies/risk factor reduction/client/family/caregiver education
risk factor reduction/client/family/caregiver education/diagnostic results/impressions and/or recommended studies/prognosis/importance of adherence to chosen treatment/risks and benefits of treatment options/instructions for management, treatment and follow up
importance of adherence to chosen treatment/client/family/caregiver education/prognosis/diagnostic results/impressions and/or recommended studies/instructions for management, treatment and follow up/risk factor reduction/risks and benefits of treatment options

## 2018-03-22 NOTE — PROGRESS NOTE ADULT - PROBLEM SELECTOR PLAN 5
Patient's Calcium 9.2 and phosphorus 4.0 at present.  Obtain intact PTH - 286, vitamin d - 8.6 - please consider 50 000 weekly   Low phos/renal diet.

## 2018-03-22 NOTE — PROGRESS NOTE BEHAVIORAL HEALTH - NSBHCHARTREVIEWVS_PSY_A_CORE FT
Vital Signs Last 24 Hrs  T(C): 35.7 (22 Mar 2018 15:00), Max: 36.1 (21 Mar 2018 16:40)  T(F): 96.3 (22 Mar 2018 15:00), Max: 97 (22 Mar 2018 09:00)  HR: 72 (22 Mar 2018 15:00) (69 - 78)  BP: 67/59 (22 Mar 2018 15:00) (64/55 - 88/81)  BP(mean): 64 (22 Mar 2018 15:00) (58 - 86)  RR: 14 (22 Mar 2018 15:00) (9 - 27)  SpO2: 100% (22 Mar 2018 15:00) (96% - 100%)
Vital Signs Last 24 Hrs  T(C): 35.6 (20 Mar 2018 15:15), Max: 36.6 (19 Mar 2018 18:17)  T(F): 96 (20 Mar 2018 15:15), Max: 97.8 (19 Mar 2018 18:17)  HR: 88 (20 Mar 2018 16:00) (83 - 110)  BP: 82/71 (20 Mar 2018 16:00) (76/65 - 98/68)  BP(mean): 77 (20 Mar 2018 16:00) (69 - 80)  RR: 19 (20 Mar 2018 16:00) (13 - 26)  SpO2: 100% (20 Mar 2018 16:00) (99% - 100%)
Vital Signs Last 24 Hrs  T(C): 36.3 (19 Mar 2018 09:04), Max: 36.3 (18 Mar 2018 15:00)  T(F): 97.3 (19 Mar 2018 09:04), Max: 97.4 (18 Mar 2018 21:59)  HR: 88 (19 Mar 2018 13:00) (82 - 96)  BP: --  BP(mean): --  RR: 21 (19 Mar 2018 13:00) (12 - 29)  SpO2: 100% (19 Mar 2018 13:00) (89% - 100%)

## 2018-03-22 NOTE — PROGRESS NOTE BEHAVIORAL HEALTH - NSBHFUPINTERVALHXFT_PSY_A_CORE
Patient seen at bedside.  Again states he is feeling better although appears more down compared to yesterday.  Believes he is not getting clear information about his medical situation.  Wants the "stronger" dialysis in order to "get rid of the toxins in my legs."  Clear deficits in attention and memory.  At first says daughter didn't visit yesterday and he was upset about not getting visitors, then remembers she did.
Patient seen at bedside.  Reports he is feeling much better.  More alert and conversant than on Friday.  Admits to occasional confusion.  Endorses depressed mood since prior to admission in setting of medical illness, loss of independence.  Denies prior psych history.  Would like to trial antidepressant.  Does not want dialysis after admission.
Patient seen at bedside.  Sleeping when I entered but woke easily.  Reports he is feeling a little stronger, aware he is being transferred to "5th floor."  Reports good sleep, admits to feelign "weary" at times throughout the day.  Engaged in conversation about LORRAINE.

## 2018-03-22 NOTE — PROGRESS NOTE ADULT - PROBLEM SELECTOR PLAN 2
Acute on chronic systolic CHF with LVEF 15 % and severely low blood pressure  Daily weight - please limit the fluid intake   - and follow up closely the fluid intake - please   - on Vassopresin now   - please consider arterial line for better monitoring BP

## 2018-03-22 NOTE — PROGRESS NOTE ADULT - ASSESSMENT
63M PMH HFrEF 10-15% (ischemic), MI, s/p AICD vs PPM, Afib, HTN, DM2 on insulin,  CKD, and gout, who presents with likely mixed septic shock 2/2 LE cellulitis and cardiogenic shock with HFrEF 10-15%, now with resolving sepsis and continued cardiogenic shock 63M PMH HFrEF 10-15% (ischemic), MI, s/p AICD vs PPM, ventricular thrombus vs Afib, HTN, DM2 on insulin,  CKD, and gout, who presents with likely mixed septic shock 2/2 LE cellulitis and cardiogenic shock with HFrEF 10-15%, now with resolving sepsis and continued cardiogenic shock

## 2018-03-22 NOTE — PROGRESS NOTE BEHAVIORAL HEALTH - ABNORMAL MOVEMENTS
The patient is a 57y Male complaining of palpitations.
No abnormal movements

## 2018-03-22 NOTE — PROGRESS NOTE BEHAVIORAL HEALTH - SECONDARY DX1
Acute renal failure, unspecified acute renal failure type

## 2018-03-22 NOTE — PROGRESS NOTE ADULT - SUBJECTIVE AND OBJECTIVE BOX
TRANSFER ACCEPTANCE NOTE MICU to CCU  Hospital Course  64 yo M w/ pmhx of ischemic CMP (LVEF <10-15%,  s/p AICD), recent STEMI in July 2017 of pLAD s/p KEEGAN, LV thrombus (on Coumadin), HTN, DM2, CKD presents with fevers/weakness for 1-2 day (on March 10). Found to be septic shcok 2/2 LLE cellulitis. He was given volume and on pressors along with dobutamine.  He was stated on CVVH for an GILMER on CKD. Cardiology consulted Friday for mixed venous sats in 40-50%, along with anemia and fluid overload.  Recommended HD and transfusion. The mixed venous sats improved to ~60. Hit ab + and started on agatroban. Dobutamine stopped- and started on hydralazine/isordil (3/19). CVP decreased from 19mmHg  but unable to dialyze much 2/2 low BP, so afterload reduction stopped.    OVERNIGHT EVENTS:    SUBJECTIVE:    Vital Signs Last 12 Hrs  T(F): 97 (03-22-18 @ 14:00), Max: 97 (03-22-18 @ 09:00)  HR: 74 (03-22-18 @ 13:50) (69 - 76)  BP: 69/56 (03-22-18 @ 13:50) (64/55 - 79/68)  BP(mean): 61 (03-22-18 @ 13:50) (58 - 78)  RR: 17 (03-22-18 @ 13:50) (9 - 19)  SpO2: 100% (03-22-18 @ 13:50) (96% - 100%)  I&O's Summary    21 Mar 2018 07:01  -  22 Mar 2018 07:00  --------------------------------------------------------  IN: 48 mL / OUT: 611 mL / NET: -563 mL    22 Mar 2018 07:01  -  22 Mar 2018 14:20  --------------------------------------------------------  IN: 1.8 mL / OUT: 541 mL / NET: -539.2 mL    PHYSICAL EXAM:  Constitutional: NAD, comfortable in bed.  HEENT: NC/AT, PERRLA, EOMI, no conjunctival pallor or scleral icterus, MMM  Neck: Supple, no JVD  Respiratory: Normal rate, rhythm, depth, effort. CTAB. No w/r/r.   Cardiovascular: RRR, normal S1 and S2, no m/r/g.   Gastrointestinal: +BS, soft NTND, no guarding or rebound tenderness, no palpable masses   Extremities: wwp; no cyanosis, clubbing or edema.   Vascular: Pulses equal and strong throughout.   Neurological: AAOx3, no CN deficits, strength and sensation intact throughout.   Skin: No gross skin abnormalities or rashes    LABS:                        8.8    11.6  )-----------( 87       ( 22 Mar 2018 07:01 )             28.6     03-22    138  |  99  |  34<H>  ----------------------------<  75  3.6   |  26  |  2.14<H>    Ca    8.8      22 Mar 2018 07:01  Phos  3.5     03-22  Mg     1.8     03-22    PT/INR - ( 22 Mar 2018 07:01 )   PT: 23.9 sec;   INR: 2.12     PTT - ( 22 Mar 2018 07:01 )  PTT:64.0 sec    RADIOLOGY & ADDITIONAL TESTS:    MEDICATIONS  (STANDING):  aspirin  chewable 81 milliGRAM(s) Oral daily  atorvastatin 80 milliGRAM(s) Oral at bedtime  bisacodyl 5 milliGRAM(s) Oral daily  chlorhexidine 2% Cloths 1 Application(s) Topical daily  dextrose 5%. 1000 milliLiter(s) (50 mL/Hr) IV Continuous <Continuous>  dextrose 50% Injectable 12.5 Gram(s) IV Push once  dextrose 50% Injectable 25 Gram(s) IV Push once  dextrose 50% Injectable 25 Gram(s) IV Push once  escitalopram 5 milliGRAM(s) Oral daily  folic acid 1 milliGRAM(s) Oral daily  insulin glargine Injectable (LANTUS) 5 Unit(s) SubCutaneous at bedtime  insulin lispro (HumaLOG) corrective regimen sliding scale   SubCutaneous Before meals and at bedtime  melatonin 3 milliGRAM(s) Oral at bedtime  multivitamin 1 Tablet(s) Oral daily  PureFlow Dialysate RFP-400 (K 2 / Ca 3) 5000 milliLiter(s) (2000 mL/Hr) CRRT <Continuous>  sodium chloride 0.9% lock flush 20 milliLiter(s) IV Push once  thiamine 100 milliGRAM(s) Oral daily  vasopressin Infusion 0.01 Unit(s)/Min (0.6 mL/Hr) IV Continuous <Continuous>  warfarin 3 milliGRAM(s) Oral once    MEDICATIONS  (PRN):  acetaminophen   Tablet. 650 milliGRAM(s) Oral every 6 hours PRN Moderate Pain (4 - 6)  dextrose Gel 1 Dose(s) Oral once PRN Blood Glucose LESS THAN 70 milliGRAM(s)/deciliter  glucagon  Injectable 1 milliGRAM(s) IntraMuscular once PRN Glucose LESS THAN 70 milligrams/deciliter  sodium chloride 0.9% lock flush 10 milliLiter(s) IV Push every 1 hour PRN After each medication administration  sodium chloride 0.9% lock flush 10 milliLiter(s) IV Push every 12 hours PRN Lumen of catheter NOT used TRANSFER ACCEPTANCE NOTE MICU to CCU  Hospital Course  64 yo M w/ pmhx of ischemic CMP (LVEF <10-15%,  s/p AICD), recent STEMI in July 2017 of pLAD s/p KEEGAN, LV thrombus (on Coumadin), HTN, DM2, CKD presents with fevers/weakness for 1-2 day (on March 10). Found to be septic shcok 2/2 LLE cellulitis. He was given volume and on pressors along with dobutamine.  He was stated on CVVH for an GILMER on CKD. Cardiology consulted Friday for mixed venous sats in 40-50%, along with anemia and fluid overload.  Recommended HD and transfusion. The mixed venous sats improved to ~60. Hit ab + and started on agatroban. Dobutamine stopped- and started on hydralazine/isordil (3/19).     OVERNIGHT EVENTS:    SUBJECTIVE:    Vital Signs Last 12 Hrs  T(F): 97 (03-22-18 @ 14:00), Max: 97 (03-22-18 @ 09:00)  HR: 74 (03-22-18 @ 13:50) (69 - 76)  BP: 69/56 (03-22-18 @ 13:50) (64/55 - 79/68)  BP(mean): 61 (03-22-18 @ 13:50) (58 - 78)  RR: 17 (03-22-18 @ 13:50) (9 - 19)  SpO2: 100% (03-22-18 @ 13:50) (96% - 100%)  I&O's Summary    21 Mar 2018 07:01  -  22 Mar 2018 07:00  --------------------------------------------------------  IN: 48 mL / OUT: 611 mL / NET: -563 mL    22 Mar 2018 07:01  -  22 Mar 2018 14:20  --------------------------------------------------------  IN: 1.8 mL / OUT: 541 mL / NET: -539.2 mL    PHYSICAL EXAM:  Constitutional: NAD, comfortable in bed.  HEENT: NC/AT, PERRLA, EOMI, no conjunctival pallor or scleral icterus, MMM  Neck: Supple, no JVD  Respiratory: Normal rate, rhythm, depth, effort. CTAB. No w/r/r.   Cardiovascular: RRR, normal S1 and S2, no m/r/g.   Gastrointestinal: +BS, soft NTND, no guarding or rebound tenderness, no palpable masses   Extremities: wwp; no cyanosis, clubbing or edema.   Vascular: Pulses equal and strong throughout.   Neurological: AAOx3, no CN deficits, strength and sensation intact throughout.   Skin: No gross skin abnormalities or rashes    LABS:                        8.8    11.6  )-----------( 87       ( 22 Mar 2018 07:01 )             28.6     03-22    138  |  99  |  34<H>  ----------------------------<  75  3.6   |  26  |  2.14<H>    Ca    8.8      22 Mar 2018 07:01  Phos  3.5     03-22  Mg     1.8     03-22    PT/INR - ( 22 Mar 2018 07:01 )   PT: 23.9 sec;   INR: 2.12     PTT - ( 22 Mar 2018 07:01 )  PTT:64.0 sec    RADIOLOGY & ADDITIONAL TESTS:    MEDICATIONS  (STANDING):  aspirin  chewable 81 milliGRAM(s) Oral daily  atorvastatin 80 milliGRAM(s) Oral at bedtime  bisacodyl 5 milliGRAM(s) Oral daily  chlorhexidine 2% Cloths 1 Application(s) Topical daily  dextrose 5%. 1000 milliLiter(s) (50 mL/Hr) IV Continuous <Continuous>  dextrose 50% Injectable 12.5 Gram(s) IV Push once  dextrose 50% Injectable 25 Gram(s) IV Push once  dextrose 50% Injectable 25 Gram(s) IV Push once  escitalopram 5 milliGRAM(s) Oral daily  folic acid 1 milliGRAM(s) Oral daily  insulin glargine Injectable (LANTUS) 5 Unit(s) SubCutaneous at bedtime  insulin lispro (HumaLOG) corrective regimen sliding scale   SubCutaneous Before meals and at bedtime  melatonin 3 milliGRAM(s) Oral at bedtime  multivitamin 1 Tablet(s) Oral daily  PureFlow Dialysate RFP-400 (K 2 / Ca 3) 5000 milliLiter(s) (2000 mL/Hr) CRRT <Continuous>  sodium chloride 0.9% lock flush 20 milliLiter(s) IV Push once  thiamine 100 milliGRAM(s) Oral daily  vasopressin Infusion 0.01 Unit(s)/Min (0.6 mL/Hr) IV Continuous <Continuous>  warfarin 3 milliGRAM(s) Oral once    MEDICATIONS  (PRN):  acetaminophen   Tablet. 650 milliGRAM(s) Oral every 6 hours PRN Moderate Pain (4 - 6)  dextrose Gel 1 Dose(s) Oral once PRN Blood Glucose LESS THAN 70 milliGRAM(s)/deciliter  glucagon  Injectable 1 milliGRAM(s) IntraMuscular once PRN Glucose LESS THAN 70 milligrams/deciliter  sodium chloride 0.9% lock flush 10 milliLiter(s) IV Push every 1 hour PRN After each medication administration  sodium chloride 0.9% lock flush 10 milliLiter(s) IV Push every 12 hours PRN Lumen of catheter NOT used TRANSFER ACCEPTANCE NOTE MICU to CCU  Hospital Course  63M PMH HFrEF 10-15% (ischemic), MI, s/p AICD vs PPM, possible Afib, HTN, DM2 on insulin, possible CKD, and gout, who presents with a chief complaint of generalized weakness was found to be in septic shock likely 2/2 LE cellulitis. Found to be in septic shock. Pt was started on levophed for hypotension and to help renal perfusion. Pt was also started on dobutamine given low mix venous saturation however which was subsequently discontinued for developing tachycardia. Pt also experienced tachycardia on Milrinone. Pt was started on Vanco/ Zosyn and completed total 11 days of abx. PT was started on solucortef 50 q6h given recent hx of steroid use. Patient underwent Gallium scan which showed only LLE cellulitis. TTE with no vegetations. RUKHSANA could not be performed. Given worsening GILMER and low UO, renal was consulted. HD cath was placed and pt was started on CVVD. Pt ws transitioned to Hemodialysis however given persistent hypotension, pt was switched back to CVVHD. Pt was noted have b/l pulmonary effusion and required R chest tube placement with fluid studies showing exudative process. Given extensive cardiac hx and component of cardiogenic shock, cardiology was consulted. Pt was started on Vasopressin to improve blood pressure. Pt also stated on Isordil and Hydralazine but patient was not able to tolerate the medications due to persistent hypotension. Pt was started on Hep gtt for Afib but was transiently placed on Argatroban given possibility of HIT- pt is now bridged to coumadin. Patient developed thrombocytopenia likely 2/2 zosyn and sepsis. Pt transferred to CCU for further management of Cardiogenic shock     SUBJECTIVE:    Vital Signs Last 12 Hrs  T(F): 97 (03-22-18 @ 14:00), Max: 97 (03-22-18 @ 09:00)  HR: 74 (03-22-18 @ 13:50) (69 - 76)  BP: 69/56 (03-22-18 @ 13:50) (64/55 - 79/68)  BP(mean): 61 (03-22-18 @ 13:50) (58 - 78)  RR: 17 (03-22-18 @ 13:50) (9 - 19)  SpO2: 100% (03-22-18 @ 13:50) (96% - 100%)  I&O's Summary    21 Mar 2018 07:01  -  22 Mar 2018 07:00  --------------------------------------------------------  IN: 48 mL / OUT: 611 mL / NET: -563 mL    22 Mar 2018 07:01  -  22 Mar 2018 14:20  --------------------------------------------------------  IN: 1.8 mL / OUT: 541 mL / NET: -539.2 mL    PHYSICAL EXAM:  Constitutional: Cachectic appearing, NAD, comfortable in bed.  HEENT: NC/AT, PERRLA, EOMI, no conjunctival pallor or scleral icterus, MMM  Neck: Supple, no JVD. R HD cath and L IJ in place.   Respiratory: Normal rate, rhythm, depth, effort. CTAB. No w/r/r.   Cardiovascular: RRR, II/VI systolic murmur.   Gastrointestinal: +BS, soft NTND, no guarding or rebound tenderness, no palpable masses   Extremities: wwp; no cyanosis or clubbing. +1 pitting edema b/l. RLE with dressing in place that is CDI.    Vascular: +2 radial and +1 DP pulses b/l.   Neurological: AAOx3, no CN deficits, strength and sensation intact throughout.   Skin: Venous stasis changes on shins b/l. No rashes.     LABS:                        8.8    11.6  )-----------( 87       ( 22 Mar 2018 07:01 )             28.6     03-22    138  |  99  |  34<H>  ----------------------------<  75  3.6   |  26  |  2.14<H>    Ca    8.8      22 Mar 2018 07:01  Phos  3.5     03-22  Mg     1.8     03-22    PT/INR - ( 22 Mar 2018 07:01 )   PT: 23.9 sec;   INR: 2.12     PTT - ( 22 Mar 2018 07:01 )  PTT:64.0 sec    RADIOLOGY & ADDITIONAL TESTS:    MEDICATIONS  (STANDING):  aspirin  chewable 81 milliGRAM(s) Oral daily  atorvastatin 80 milliGRAM(s) Oral at bedtime  bisacodyl 5 milliGRAM(s) Oral daily  chlorhexidine 2% Cloths 1 Application(s) Topical daily  dextrose 5%. 1000 milliLiter(s) (50 mL/Hr) IV Continuous <Continuous>  dextrose 50% Injectable 12.5 Gram(s) IV Push once  dextrose 50% Injectable 25 Gram(s) IV Push once  dextrose 50% Injectable 25 Gram(s) IV Push once  escitalopram 5 milliGRAM(s) Oral daily  folic acid 1 milliGRAM(s) Oral daily  insulin glargine Injectable (LANTUS) 5 Unit(s) SubCutaneous at bedtime  insulin lispro (HumaLOG) corrective regimen sliding scale   SubCutaneous Before meals and at bedtime  melatonin 3 milliGRAM(s) Oral at bedtime  multivitamin 1 Tablet(s) Oral daily  PureFlow Dialysate RFP-400 (K 2 / Ca 3) 5000 milliLiter(s) (2000 mL/Hr) CRRT <Continuous>  sodium chloride 0.9% lock flush 20 milliLiter(s) IV Push once  thiamine 100 milliGRAM(s) Oral daily  vasopressin Infusion 0.01 Unit(s)/Min (0.6 mL/Hr) IV Continuous <Continuous>  warfarin 3 milliGRAM(s) Oral once    MEDICATIONS  (PRN):  acetaminophen   Tablet. 650 milliGRAM(s) Oral every 6 hours PRN Moderate Pain (4 - 6)  dextrose Gel 1 Dose(s) Oral once PRN Blood Glucose LESS THAN 70 milliGRAM(s)/deciliter  glucagon  Injectable 1 milliGRAM(s) IntraMuscular once PRN Glucose LESS THAN 70 milligrams/deciliter  sodium chloride 0.9% lock flush 10 milliLiter(s) IV Push every 1 hour PRN After each medication administration  sodium chloride 0.9% lock flush 10 milliLiter(s) IV Push every 12 hours PRN Lumen of catheter NOT used

## 2018-03-22 NOTE — PROCEDURE NOTE - PROCEDURE
<<-----Click on this checkbox to enter Procedure Arterial line placement  03/13/2018    Active  Kyler Xiao

## 2018-03-22 NOTE — PROGRESS NOTE BEHAVIORAL HEALTH - SUMMARY
63M no known PPH, PMH CHF, HTN, DM2 on insulin, possible CKD, and gout, recent prolonged hospitalization, who presented with septic and cardiogenic shock, ARF, initially refusing dialysis on 3/16- appeared delirious at that time.  Today much more alert and conversant in setting of subsequent dialysis but endorsing depressed mood.  Would start Lexapro 5 mg PO daily.
63M no known PPH, PMH CHF, HTN, DM2 on insulin, possible CKD, and gout, who presented with septic and cardiogenic shock in setting of cellulitis, ARF, with complicated hospital course.  Initially refusing dialysis on 3/16 and psych consulted for capacity to refuse- appeared delirious at that time.  Now more alert and conversant in setting of subsequent dialysis but endorsing depressed mood and with ongoing attention and memory impairment, likely at least in part new baseline since MI last summer as per daughter.  Would continue Lexapro 5 mg PO daily, likely increase next week to 10 mg PO daily.
63M no known PPH, PMH CHF, HTN, DM2 on insulin, possible CKD, and gout, recent prolonged hospitalization, who presented with septic and cardiogenic shock, ARF, initially refusing dialysis on 3/16- appeared delirious at that time.  Still more alert and conversant in setting of subsequent dialysis but endorsing depressed mood and with ongoing attention and memory impairment.  Would continue Lexapro 5 mg PO daily.

## 2018-03-23 DIAGNOSIS — R41.82 ALTERED MENTAL STATUS, UNSPECIFIED: ICD-10-CM

## 2018-03-23 DIAGNOSIS — I95.89 OTHER HYPOTENSION: ICD-10-CM

## 2018-03-23 LAB
ANION GAP SERPL CALC-SCNC: 14 MMOL/L — SIGNIFICANT CHANGE UP (ref 5–17)
ANION GAP SERPL CALC-SCNC: 14 MMOL/L — SIGNIFICANT CHANGE UP (ref 5–17)
ANISOCYTOSIS BLD QL: SLIGHT — SIGNIFICANT CHANGE UP
APTT BLD: 56.2 SEC — HIGH (ref 27.5–37.4)
BUN SERPL-MCNC: 27 MG/DL — HIGH (ref 7–23)
BUN SERPL-MCNC: 27 MG/DL — HIGH (ref 7–23)
CALCIUM SERPL-MCNC: 8.4 MG/DL — SIGNIFICANT CHANGE UP (ref 8.4–10.5)
CALCIUM SERPL-MCNC: 8.7 MG/DL — SIGNIFICANT CHANGE UP (ref 8.4–10.5)
CHLORIDE SERPL-SCNC: 100 MMOL/L — SIGNIFICANT CHANGE UP (ref 96–108)
CHLORIDE SERPL-SCNC: 96 MMOL/L — SIGNIFICANT CHANGE UP (ref 96–108)
CO2 SERPL-SCNC: 21 MMOL/L — LOW (ref 22–31)
CO2 SERPL-SCNC: 23 MMOL/L — SIGNIFICANT CHANGE UP (ref 22–31)
CREAT SERPL-MCNC: 1.91 MG/DL — HIGH (ref 0.5–1.3)
CREAT SERPL-MCNC: 1.97 MG/DL — HIGH (ref 0.5–1.3)
GAS PNL BLDV: SIGNIFICANT CHANGE UP
GLUCOSE BLDC GLUCOMTR-MCNC: 102 MG/DL — HIGH (ref 70–99)
GLUCOSE BLDC GLUCOMTR-MCNC: 103 MG/DL — HIGH (ref 70–99)
GLUCOSE BLDC GLUCOMTR-MCNC: 105 MG/DL — HIGH (ref 70–99)
GLUCOSE BLDC GLUCOMTR-MCNC: 66 MG/DL — LOW (ref 70–99)
GLUCOSE BLDC GLUCOMTR-MCNC: 72 MG/DL — SIGNIFICANT CHANGE UP (ref 70–99)
GLUCOSE BLDC GLUCOMTR-MCNC: 74 MG/DL — SIGNIFICANT CHANGE UP (ref 70–99)
GLUCOSE BLDC GLUCOMTR-MCNC: 85 MG/DL — SIGNIFICANT CHANGE UP (ref 70–99)
GLUCOSE SERPL-MCNC: 118 MG/DL — HIGH (ref 70–99)
GLUCOSE SERPL-MCNC: 87 MG/DL — SIGNIFICANT CHANGE UP (ref 70–99)
HCT VFR BLD CALC: 28.6 % — LOW (ref 39–50)
HCT VFR BLD CALC: 29.1 % — LOW (ref 39–50)
HGB BLD-MCNC: 9 G/DL — LOW (ref 13–17)
HGB BLD-MCNC: 9.3 G/DL — LOW (ref 13–17)
HYPOCHROMIA BLD QL: SLIGHT — SIGNIFICANT CHANGE UP
INR BLD: 2.21 — HIGH (ref 0.88–1.16)
LACTATE SERPL-SCNC: 1.9 MMOL/L — SIGNIFICANT CHANGE UP (ref 0.5–2)
LYMPHOCYTES # BLD AUTO: 7 % — LOW (ref 13–44)
MACROCYTES BLD QL: SLIGHT — SIGNIFICANT CHANGE UP
MAGNESIUM SERPL-MCNC: 1.8 MG/DL — SIGNIFICANT CHANGE UP (ref 1.6–2.6)
MAGNESIUM SERPL-MCNC: 1.9 MG/DL — SIGNIFICANT CHANGE UP (ref 1.6–2.6)
MAGNESIUM SERPL-MCNC: 1.9 MG/DL — SIGNIFICANT CHANGE UP (ref 1.6–2.6)
MANUAL DIF COMMENT BLD-IMP: SIGNIFICANT CHANGE UP
MANUAL DIF COMMENT BLD-IMP: SIGNIFICANT CHANGE UP
MANUAL SMEAR VERIFICATION: YES — SIGNIFICANT CHANGE UP
MCHC RBC-ENTMCNC: 30.3 PG — SIGNIFICANT CHANGE UP (ref 27–34)
MCHC RBC-ENTMCNC: 30.8 PG — SIGNIFICANT CHANGE UP (ref 27–34)
MCHC RBC-ENTMCNC: 31.5 G/DL — LOW (ref 32–36)
MCHC RBC-ENTMCNC: 32 G/DL — SIGNIFICANT CHANGE UP (ref 32–36)
MCV RBC AUTO: 96.3 FL — SIGNIFICANT CHANGE UP (ref 80–100)
MCV RBC AUTO: 96.4 FL — SIGNIFICANT CHANGE UP (ref 80–100)
METAMYELOCYTES # FLD: 1 % — HIGH
MONOCYTES NFR BLD AUTO: 8 % — SIGNIFICANT CHANGE UP (ref 2–14)
MYELOCYTES NFR BLD: 2 % — HIGH
NEUTROPHILS NFR BLD AUTO: 81 % — HIGH (ref 43–77)
NEUTS BAND # BLD: 1 % — SIGNIFICANT CHANGE UP
NEUTS HYPERSEG # BLD: PRESENT — SIGNIFICANT CHANGE UP
PHOSPHATE SERPL-MCNC: 2.8 MG/DL — SIGNIFICANT CHANGE UP (ref 2.5–4.5)
PLAT MORPH BLD: (no result)
PLATELET # BLD AUTO: 105 K/UL — LOW (ref 150–400)
PLATELET # BLD AUTO: 137 K/UL — LOW (ref 150–400)
POLYCHROMASIA BLD QL SMEAR: SLIGHT — SIGNIFICANT CHANGE UP
POTASSIUM SERPL-MCNC: 3.2 MMOL/L — LOW (ref 3.5–5.3)
POTASSIUM SERPL-MCNC: 3.4 MMOL/L — LOW (ref 3.5–5.3)
POTASSIUM SERPL-SCNC: 3.2 MMOL/L — LOW (ref 3.5–5.3)
POTASSIUM SERPL-SCNC: 3.4 MMOL/L — LOW (ref 3.5–5.3)
PROTHROM AB SERPL-ACNC: 24.9 SEC — HIGH (ref 9.8–12.7)
RBC # BLD: 2.97 M/UL — LOW (ref 4.2–5.8)
RBC # BLD: 3.02 M/UL — LOW (ref 4.2–5.8)
RBC # FLD: 18.7 % — HIGH (ref 10.3–16.9)
RBC # FLD: 19 % — HIGH (ref 10.3–16.9)
RBC BLD AUTO: (no result)
SODIUM SERPL-SCNC: 131 MMOL/L — LOW (ref 135–145)
SODIUM SERPL-SCNC: 137 MMOL/L — SIGNIFICANT CHANGE UP (ref 135–145)
TOXIC GRANULES BLD QL SMEAR: SLIGHT — SIGNIFICANT CHANGE UP
WBC # BLD: 11.8 K/UL — HIGH (ref 3.8–10.5)
WBC # BLD: 16.9 K/UL — HIGH (ref 3.8–10.5)
WBC # FLD AUTO: 11.8 K/UL — HIGH (ref 3.8–10.5)
WBC # FLD AUTO: 16.9 K/UL — HIGH (ref 3.8–10.5)

## 2018-03-23 PROCEDURE — 99233 SBSQ HOSP IP/OBS HIGH 50: CPT

## 2018-03-23 PROCEDURE — 95951: CPT | Mod: 26

## 2018-03-23 PROCEDURE — 99233 SBSQ HOSP IP/OBS HIGH 50: CPT | Mod: GC

## 2018-03-23 PROCEDURE — 99231 SBSQ HOSP IP/OBS SF/LOW 25: CPT

## 2018-03-23 PROCEDURE — 71045 X-RAY EXAM CHEST 1 VIEW: CPT | Mod: 26

## 2018-03-23 PROCEDURE — 71045 X-RAY EXAM CHEST 1 VIEW: CPT | Mod: 26,77

## 2018-03-23 PROCEDURE — 90945 DIALYSIS ONE EVALUATION: CPT

## 2018-03-23 RX ORDER — CHLORHEXIDINE GLUCONATE 213 G/1000ML
15 SOLUTION TOPICAL
Qty: 0 | Refills: 0 | Status: DISCONTINUED | OUTPATIENT
Start: 2018-03-23 | End: 2018-04-02

## 2018-03-23 RX ORDER — HYDROCORTISONE 20 MG
25 TABLET ORAL ONCE
Qty: 0 | Refills: 0 | Status: COMPLETED | OUTPATIENT
Start: 2018-03-23 | End: 2018-03-23

## 2018-03-23 RX ORDER — POTASSIUM CHLORIDE 20 MEQ
20 PACKET (EA) ORAL
Qty: 0 | Refills: 0 | Status: DISCONTINUED | OUTPATIENT
Start: 2018-03-23 | End: 2018-03-23

## 2018-03-23 RX ORDER — POTASSIUM CHLORIDE 20 MEQ
20 PACKET (EA) ORAL
Qty: 0 | Refills: 0 | Status: COMPLETED | OUTPATIENT
Start: 2018-03-23 | End: 2018-03-24

## 2018-03-23 RX ORDER — MAGNESIUM SULFATE 500 MG/ML
1 VIAL (ML) INJECTION ONCE
Qty: 0 | Refills: 0 | Status: COMPLETED | OUTPATIENT
Start: 2018-03-23 | End: 2018-03-24

## 2018-03-23 RX ORDER — WARFARIN SODIUM 2.5 MG/1
2.5 TABLET ORAL ONCE
Qty: 0 | Refills: 0 | Status: DISCONTINUED | OUTPATIENT
Start: 2018-03-23 | End: 2018-03-23

## 2018-03-23 RX ORDER — SENNA PLUS 8.6 MG/1
2 TABLET ORAL AT BEDTIME
Qty: 0 | Refills: 0 | Status: DISCONTINUED | OUTPATIENT
Start: 2018-03-23 | End: 2018-03-26

## 2018-03-23 RX ORDER — POTASSIUM CHLORIDE 20 MEQ
20 PACKET (EA) ORAL
Qty: 0 | Refills: 0 | Status: COMPLETED | OUTPATIENT
Start: 2018-03-23 | End: 2018-03-23

## 2018-03-23 RX ORDER — MAGNESIUM SULFATE 500 MG/ML
1 VIAL (ML) INJECTION ONCE
Qty: 0 | Refills: 0 | Status: COMPLETED | OUTPATIENT
Start: 2018-03-23 | End: 2018-03-23

## 2018-03-23 RX ORDER — DEXTROSE 50 % IN WATER 50 %
25 SYRINGE (ML) INTRAVENOUS ONCE
Qty: 0 | Refills: 0 | Status: COMPLETED | OUTPATIENT
Start: 2018-03-23 | End: 2018-03-23

## 2018-03-23 RX ORDER — HYDROCORTISONE 20 MG
50 TABLET ORAL EVERY 6 HOURS
Qty: 0 | Refills: 0 | Status: DISCONTINUED | OUTPATIENT
Start: 2018-03-23 | End: 2018-03-27

## 2018-03-23 RX ORDER — DEXTROSE 50 % IN WATER 50 %
12.5 SYRINGE (ML) INTRAVENOUS ONCE
Qty: 0 | Refills: 0 | Status: COMPLETED | OUTPATIENT
Start: 2018-03-23 | End: 2018-03-23

## 2018-03-23 RX ORDER — WARFARIN SODIUM 2.5 MG/1
2.5 TABLET ORAL ONCE
Qty: 0 | Refills: 0 | Status: COMPLETED | OUTPATIENT
Start: 2018-03-23 | End: 2018-03-23

## 2018-03-23 RX ORDER — INSULIN LISPRO 100/ML
VIAL (ML) SUBCUTANEOUS EVERY 6 HOURS
Qty: 0 | Refills: 0 | Status: DISCONTINUED | OUTPATIENT
Start: 2018-03-23 | End: 2018-04-08

## 2018-03-23 RX ADMIN — SENNA PLUS 2 TABLET(S): 8.6 TABLET ORAL at 21:19

## 2018-03-23 RX ADMIN — Medication 12.5 GRAM(S): at 09:02

## 2018-03-23 RX ADMIN — Medication 50 MILLIGRAM(S): at 21:19

## 2018-03-23 RX ADMIN — PROPOFOL 0.01 MICROGRAM(S)/KG/MIN: 10 INJECTION, EMULSION INTRAVENOUS at 06:00

## 2018-03-23 RX ADMIN — Medication 1 MILLIGRAM(S): at 16:09

## 2018-03-23 RX ADMIN — Medication 25 GRAM(S): at 18:06

## 2018-03-23 RX ADMIN — Medication 1 TABLET(S): at 16:11

## 2018-03-23 RX ADMIN — INSULIN GLARGINE 5 UNIT(S): 100 INJECTION, SOLUTION SUBCUTANEOUS at 00:00

## 2018-03-23 RX ADMIN — WARFARIN SODIUM 2.5 MILLIGRAM(S): 2.5 TABLET ORAL at 21:19

## 2018-03-23 RX ADMIN — ESCITALOPRAM OXALATE 5 MILLIGRAM(S): 10 TABLET, FILM COATED ORAL at 16:09

## 2018-03-23 RX ADMIN — Medication 81 MILLIGRAM(S): at 16:09

## 2018-03-23 RX ADMIN — Medication 25 GRAM(S): at 06:06

## 2018-03-23 RX ADMIN — Medication 50 MILLIEQUIVALENT(S): at 23:44

## 2018-03-23 RX ADMIN — Medication 50 MILLIGRAM(S): at 16:08

## 2018-03-23 RX ADMIN — Medication 100 GRAM(S): at 08:41

## 2018-03-23 RX ADMIN — Medication 100 MILLIGRAM(S): at 16:09

## 2018-03-23 RX ADMIN — ATORVASTATIN CALCIUM 80 MILLIGRAM(S): 80 TABLET, FILM COATED ORAL at 21:18

## 2018-03-23 RX ADMIN — Medication 25 MILLIGRAM(S): at 11:03

## 2018-03-23 RX ADMIN — CHLORHEXIDINE GLUCONATE 1 APPLICATION(S): 213 SOLUTION TOPICAL at 14:15

## 2018-03-23 RX ADMIN — Medication 25 MILLIGRAM(S): at 06:07

## 2018-03-23 RX ADMIN — CHLORHEXIDINE GLUCONATE 15 MILLILITER(S): 213 SOLUTION TOPICAL at 19:23

## 2018-03-23 RX ADMIN — Medication 50 MILLIEQUIVALENT(S): at 08:41

## 2018-03-23 RX ADMIN — Medication 50 MILLIEQUIVALENT(S): at 11:02

## 2018-03-23 RX ADMIN — VASOPRESSIN 0.6 UNIT(S)/MIN: 20 INJECTION INTRAVENOUS at 11:02

## 2018-03-23 NOTE — PROGRESS NOTE ADULT - ASSESSMENT
62 yo M w/ ischemic cardiomyopathy presents with acute on chronic systolic heart failure in the setting of sepsis and GILMER requiring HD  -on CVVHD and CVP ~10mmHg with continuous fluid removal  -on levophed/vasopressin for BP support  -will need to continue fluid removal and keep BP supported with pressors then can start neurohormonal antagonists when situation stabilizes

## 2018-03-23 NOTE — PROGRESS NOTE ADULT - ATTENDING COMMENTS
Patient seen and examined with house-staff during bedside rounds.  Resident note read, including vitals, physical findings, laboratory data, and radiological reports.   Revisions included below.  Direct personal management at bed side and extensive interpretation of the data.  Plan was outlined and discussed in details with the housestaff.  Decision making of high complexity  Action taken for acute disease activity to reflect the level of care provided:  - medication reconciliation  - review laboratory data  - events noticed  - On pressors and will wean off levo first  - increase steroids  - on PSV and vent management  - EEG and neuro consult  - Discussed with cardio  - RUKHSANA

## 2018-03-23 NOTE — PROGRESS NOTE ADULT - SUBJECTIVE AND OBJECTIVE BOX
HPI:  64 yo M w/ pmhx of ischemic CMP (LVEF <10-15%,  s/p AICD), recent STEMI in July 2017 of pLAD s/p KEGEAN, LV thrombus (on Coumadin), HTN, DM2, CKD presents with fevers/weakness for 1-2 day (on March 10). Found to be septic shcok 2/2 LLE cellulitis. He was given volume and on pressors along with dobutamine.  He was stated on CVVH for an GILMER on CKD. Cardiology consulted Friday for mixed venous sats in 40-50%, along with anemia and fluid overload.  Recommended HD and transfusion. The mixed venous sats improved to ~60. Hit ab + and started on agatroban. Dobutamine stopped- and started on hydralazine/isordil (3/19). CVP up to ~19mmHg but unable to dialyze much 2/2 low BP, so afterload reduction stopped. CVVHD was started on 3/21 PM and vasopressin added for BP control    Interval history  -midodine stopped  -HIT ELVIRA negative  -A-line placed and BP ~90/65 on levophed 0.053 and vasopressin 0.04  -CVP down to around 10mmHg  -Last night had an episode of unresponsiveness and ?stroke. Intubated with negative workup.    PAST MEDICAL & SURGICAL HISTORY:  Type 2 diabetes mellitus with diabetic peripheral angiopathy without gangrene, with long-term curren  Essential hypertension, benign  Gout  Pacemaker  Chronic systolic heart failure  Myocardial infarction  No significant past surgical history      Vital Signs Last 24 Hrs  T(C): 35.2 (23 Mar 2018 10:30), Max: 36.1 (22 Mar 2018 14:00)  T(F): 95.3 (23 Mar 2018 10:30), Max: 97 (22 Mar 2018 14:00)  HR: 78 (23 Mar 2018 10:00) (72 - 114)  BP: 101/81 (23 Mar 2018 01:00) (67/59 - 101/81)  BP(mean): 88 (23 Mar 2018 01:00) (60 - 88)  RR: 10 (23 Mar 2018 10:00) (10 - 34)  SpO2: 100% (23 Mar 2018 10:00) (95% - 100%)   I&O's Detail    22 Mar 2018 07:01  -  23 Mar 2018 07:00  --------------------------------------------------------  IN:    norepinephrine Infusion: 103 mL    propofol Infusion: 80 mL    vasopressin Infusion: 57 mL  Total IN: 240 mL    OUT:    Intermittent Catheterization - Urethral: 300 mL    Other: 737 mL  Total OUT: 1037 mL    Total NET: -797 mL      23 Mar 2018 07:01  -  23 Mar 2018 11:28  --------------------------------------------------------  IN:    IV PiggyBack: 200 mL    norepinephrine Infusion: 15 mL    vasopressin Infusion: 7.2 mL  Total IN: 222.2 mL    OUT:    Other: 63 mL  Total OUT: 63 mL    Total NET: 159.2 mL        Daily     Daily     Physical Exam:   GEN: intubated  HEENT: MMM, no icterus  CV: S1 S2 RRR, no MRG  Lung: CTAB  Abd: soft NT ND +BS  Ext: + LE edema with stasis changes  Neuro: sedated    MEDICATIONS  (STANDING):  aspirin  chewable 81 milliGRAM(s) Oral daily  atorvastatin 80 milliGRAM(s) Oral at bedtime  chlorhexidine 0.12% Liquid 15 milliLiter(s) Swish and Spit two times a day  chlorhexidine 2% Cloths 1 Application(s) Topical daily  dextrose 5%. 1000 milliLiter(s) (50 mL/Hr) IV Continuous <Continuous>  dextrose 50% Injectable 12.5 Gram(s) IV Push once  dextrose 50% Injectable 25 Gram(s) IV Push once  dextrose 50% Injectable 25 Gram(s) IV Push once  escitalopram 5 milliGRAM(s) Oral daily  folic acid 1 milliGRAM(s) Oral daily  hydrocortisone sodium succinate Injectable 50 milliGRAM(s) IV Push every 6 hours  insulin lispro (HumaLOG) corrective regimen sliding scale   SubCutaneous every 6 hours  multivitamin 1 Tablet(s) Oral daily  norepinephrine Infusion 0.01 MICROgram(s)/kG/Min (1.5 mL/Hr) IV Continuous <Continuous>  propofol Infusion 0.01 MICROgram(s)/kG/Min (0.005 mL/Hr) IV Continuous <Continuous>  PureFlow Dialysate RFP-400 (K 2 / Ca 3) 5000 milliLiter(s) (1500 mL/Hr) CRRT <Continuous>  senna 2 Tablet(s) Oral at bedtime  sodium chloride 0.9% lock flush 20 milliLiter(s) IV Push once  thiamine 100 milliGRAM(s) Oral daily  vasopressin Infusion 0.01 Unit(s)/Min (0.6 mL/Hr) IV Continuous <Continuous>      LABS:                        9.3    16.9  )-----------( 137      ( 23 Mar 2018 05:50 )             29.1     03-23    137  |  100  |  27<H>  ----------------------------<  87  3.2<L>   |  23  |  1.97<H>    Ca    8.7      23 Mar 2018 05:50  Phos  2.8     03-23  Mg     1.9     03-23    TPro  5.6<L>  /  Alb  3.1<L>  /  TBili  2.5<H>  /  DBili  x   /  AST  26  /  ALT  24  /  AlkPhos  104  03-22        PT/INR - ( 23 Mar 2018 05:50 )   PT: 24.9 sec;   INR: 2.21          PTT - ( 23 Mar 2018 05:50 )  PTT:56.2 sec      RADIOLOGY & ADDITIONAL STUDIES:    ECG: NSR, low voltage, RAD, PRWP    Echo: LV borderline dilated (LVIDd 5.7), LVEF <15%, with contractility preserved in basal anterior/inferior and basal inferolateral wall. LA mildly dilated (CHIP 35cc/m2), RV borderline dilated with mildly reduced function, mild MR, mod TR, PASP 45, small pericardial effusion

## 2018-03-23 NOTE — PROGRESS NOTE ADULT - PROBLEM SELECTOR PLAN 1
pt with a significant decline in mental status in the last 24 hours.  CT head Neg.  VEEG in place.  Neurology following.  Pt currently sedated on propofol

## 2018-03-23 NOTE — PROGRESS NOTE ADULT - SUBJECTIVE AND OBJECTIVE BOX
Patient is a 63yMale with midline for difficult venous access, placed on 3/20/18.  Patient seen and examined at bedside, line visualized. Line insertion site is covered with sterile dressing, last changed on 3/20/18, with biopatch in place. Swabcaps noted to be in place appropriately. Line site is clean, dry, intact, with no erythema, drainage, or tenderness to palpation. Line is functioning well, no issues.

## 2018-03-23 NOTE — PROGRESS NOTE ADULT - PROBLEM SELECTOR PLAN 1
- oliguric GILMER from ATN from septic shock   - On CVVHD - 50 ml/h - was alaraming overnight and was stopped for a couple of hours  - we will continue with CVVHD - the dialysate flow decreased to 1.5 liters per hour -the nurse informed   - volume status on the wet side   - in and outs   - daily weight   - electrolytes noted- potassium and phosphate - been replaced from the primary team - please follow up after the repalcement -

## 2018-03-23 NOTE — PROGRESS NOTE ADULT - ASSESSMENT
64 yo M history of HFrEF 10-15% 2/2 ischemic cardiomyopathy, MI , s/p AICD vs PPM, ?Afib, Hypertension, Diabetes Mellitus Type 2 on insulin, CKD?and gout, who presents with a chief complaint of generalized weakness. Now treated for septic shock - off pressors and CHF exacerbation - still oligunaric, no urine output, But had a bladder scan last night showed 300 ml - no barnes in. Now intubated, On CVVHD - 50 ml/h, to the documentation - aproximately - 700 ml from the UF for 24 hours

## 2018-03-23 NOTE — CONSULT NOTE ADULT - SUBJECTIVE AND OBJECTIVE BOX
NEUROLOGY INITIAL CONSULT NOTE    CHIEF COMPLAINT:      HPI:  63 M PMH of HFrEF 10-15% (ischemic), s/p AICD vs PPM, ?Afib, HTN, DM2 on insulin, CKD, and gout, who presents with a chief complaint of generalized weakness with septic shock likely 2/2 LE cellulitis. Now off abx. Pt was waiting to be transferred to CCU for further management of cardiogenic shock yesterday. After A-line placed for BP monitoring around 6pm on 03/22, patient became unresponsive, described as eyes closed and unresponsive to voice or painful stimuli. FSG was 98, and vitals were stable, /63. Stroke code was called. NIHSS 39. Levophed started to help with cerebral perfusion. Patient intubated for airway protection. NIH noted to be 39. CTH showing no acute process. CTA head and neck without stenotic lesions, calcified plaques are noted in the left vertebral artery. No tPA administered. Pt remains intubated at this point. Off propofol at the time of examination.    PAST MEDICAL & SURGICAL HISTORY:  Type 2 diabetes mellitus with diabetic peripheral angiopathy without gangrene, with long-term current  Essential hypertension, benign  Gout  Pacemaker  Chronic systolic heart failure  Myocardial infarction  No significant past surgical history      REVIEW OF SYSTEMS:  Unable to obtain as pt is intubated, unresponsive to  painful stimuli    MEDICATIONS  (STANDING):  aspirin  chewable 81 milliGRAM(s) Oral daily  atorvastatin 80 milliGRAM(s) Oral at bedtime  chlorhexidine 0.12% Liquid 15 milliLiter(s) Swish and Spit two times a day  chlorhexidine 2% Cloths 1 Application(s) Topical daily  dextrose 5%. 1000 milliLiter(s) (50 mL/Hr) IV Continuous <Continuous>  dextrose 50% Injectable 12.5 Gram(s) IV Push once  dextrose 50% Injectable 25 Gram(s) IV Push once  dextrose 50% Injectable 25 Gram(s) IV Push once  escitalopram 5 milliGRAM(s) Oral daily  folic acid 1 milliGRAM(s) Oral daily  hydrocortisone sodium succinate Injectable 50 milliGRAM(s) IV Push every 6 hours  insulin lispro (HumaLOG) corrective regimen sliding scale   SubCutaneous every 6 hours  multivitamin 1 Tablet(s) Oral daily  norepinephrine Infusion 0.01 MICROgram(s)/kG/Min (1.5 mL/Hr) IV Continuous <Continuous>  propofol Infusion 0.01 MICROgram(s)/kG/Min (0.005 mL/Hr) IV Continuous <Continuous>  PureFlow Dialysate RFP-400 (K 2 / Ca 3) 5000 milliLiter(s) (1500 mL/Hr) CRRT <Continuous>  senna 2 Tablet(s) Oral at bedtime  sodium chloride 0.9% lock flush 20 milliLiter(s) IV Push once  thiamine 100 milliGRAM(s) Oral daily  vasopressin Infusion 0.01 Unit(s)/Min (0.6 mL/Hr) IV Continuous <Continuous>    MEDICATIONS  (PRN):  acetaminophen   Tablet. 650 milliGRAM(s) Oral every 6 hours PRN Moderate Pain (4 - 6)  dextrose Gel 1 Dose(s) Oral once PRN Blood Glucose LESS THAN 70 milliGRAM(s)/deciliter  glucagon  Injectable 1 milliGRAM(s) IntraMuscular once PRN Glucose LESS THAN 70 milligrams/deciliter  sodium chloride 0.9% lock flush 10 milliLiter(s) IV Push every 1 hour PRN After each medication administration  sodium chloride 0.9% lock flush 10 milliLiter(s) IV Push every 12 hours PRN Lumen of catheter NOT used      Allergies      Intolerances        FAMILY HISTORY:      SOCIAL HISTORY:  Living Situation:  Occupation:  Tobacco:  Alcohol:   Drug use:      VITAL SIGNS:  Vital Signs Last 24 Hrs  T(C): 35.6 (23 Mar 2018 14:00), Max: 35.9 (22 Mar 2018 18:00)  T(F): 96 (23 Mar 2018 14:00), Max: 96.6 (22 Mar 2018 18:00)  HR: 82 (23 Mar 2018 16:00) (74 - 114)  BP: 101/81 (23 Mar 2018 01:00) (81/72 - 101/81)  BP(mean): 88 (23 Mar 2018 01:00) (77 - 88)  RR: 14 (23 Mar 2018 16:00) (10 - 34)  SpO2: 100% (23 Mar 2018 16:00) (95% - 100%)    PHYSICAL EXAMINATION:  Constitutional: intubated, now off sedation  Eyes: downward gaze bilaterally, clear conjunctiva  Neurologic:  - Mental Status:  AAOx0; unresponsive to painful stimuli  - Cranial Nerves II-XII:    II:  Pupils are round and reactive to light  III, IV, VI:  Eyes do not track, downward gaze  V:  Unresponsive to light touch or pinprick b/l  VII:  Face is symmetric   VIII:  Does not respond to voice  IX - XII: unable to assess  - Motor:  Strength is 0/5 throughout. Normal muscle bulk and tone throughout.  - Reflexes:  1+ and symmetric at the biceps, triceps, brachioradialis, knees, and ankles.  Plantar reflexes areflexic b/l  - Sensory:  Unresponsive to painful stimuli  - Coordination:  Unable to assess as pt is not following commands  - Gait:   Unable to asses as pt is intubated and not following commands    LABS:                        9.3    16.9  )-----------( 137      ( 23 Mar 2018 05:50 )             29.1     03-23    137  |  100  |  27<H>  ----------------------------<  87  3.2<L>   |  23  |  1.97<H>    Ca    8.7      23 Mar 2018 05:50  Phos  2.8     03-23  Mg     1.9     03-23    TPro  5.6<L>  /  Alb  3.1<L>  /  TBili  2.5<H>  /  DBili  x   /  AST  26  /  ALT  24  /  AlkPhos  104  03-22    PT/INR - ( 23 Mar 2018 05:50 )   PT: 24.9 sec;   INR: 2.21          PTT - ( 23 Mar 2018 05:50 )  PTT:56.2 sec      RADIOLOGY & ADDITIONAL STUDIES:        EXAM:  CT BRAIN STROKE PROTOCOL                          PROCEDURE DATE:  03/22/2018                     INTERPRETATION:  -  CT OF THE HEAD:    Clinical information:  64 y/o male,   AMS.      Multiple axial scans of the head were obtained without intravenous   contrast medium administration. Sagittal and coronal reformatted images   were created.    The ventricles and subarachnoid spaces are slightly dilated. No   intracranial hemorrhage or mass is seen.      A hypodense area is seen in the right occipital lobe measuring 2.5 cm in   diameter and another hypodense area is seen in the left occipital lobe   measuring 2.2 x 3 cm in size. No mass effect is seen. The findings are    suggestive of areas of old encephalomalacia    There are several small and poorly defined hypodense areas in the   periventricular white matter of bilateral cerebral hemispheres, probably   representing ischemic changes or aging demyelination.     Calcifications are seen in bilateral carotid siphons and left vertebral   artery presumably secondary to atherosclerotic changes.     The bony structures are intact.  Both mastoid bones are well pneumatized   and appear normal.  The sella turcica is normal in size.  Both orbits   appear normal.   Two soft-tissue masses are seen in bilateral maxillary   sinuses, one is slight canal probably representing polyps or retention   cyst. The other visualized paranasal sinuses are clear.    IMPRESSION:-    1.  No intracranial hemorrhage or mass.    2.  Two areas of old encephalomalacia in bilateral occipital lobes, one   on each side.         EXAM:  CT ANGIO BRAIN (W)AW IC                          EXAM:  CT ANGIO NECK (W)AW IC                          PROCEDURE DATE:  03/22/2018     100  Optiray 350   20           INTERPRETATION:  -  CT ANGIOGRAM OF THE NECK AND HEAD:    Clinical information: 64 y/o male, AMS.    Following the intravenous bolus injection of 80 cc Optiray 350 mg%   helical axial scans of the head and neck were obtained with isotropic CT   data.  Axial 3 mm and 0.9 mm scans were presented.  The thinner CT data   were sent to the [employer] workstation for analysis.  3D stereoscopic   reformatted images of the cranial and cervical arteries were created.     In addition, 2D MIP images of the head and neck  were obtained in coronal   and sagittal planes.       Study is technically suboptimal due to poor timing of contrast medium   injection during CT scanning.     The major cervical arteries from the aortic arch are fairly well   opacified and appear normal. Both common carotid arteries including the   bifurcations are unremarkable. No significant stenosis is seen in either   internal carotid artery.    Both vertebral arteries in the cervical portions are well opacified and   appear normal in caliber and course.   There is no evidence of vertebral   or carotid artery dissection.  In the intracranial portion. Two calcified   plaques are noted in the left vertebral artery measuring 5 mm and  3 mm in diameter.  The right vertebral artery appears normal.    The intracranial arteries including the basilar and vertebral arteries   are poorly opacified..  No aneurysm or arteriovenous malformation is   seen.      The cervical area is is fairly well defined. Both posterior communicating   arteries are hypoplastic as normal variants. There is no definite   segmental stenosis or other vascular abnormality in the major cerebral   arteries..      The visualized intracranial veins are unremarkable.  There is no evidence   of venous thrombosis or obstruction.    Bilateral pleural effusions are noted. A pacemaker is noted in place.    IMPRESSION:-    1.  Technically suboptimal study.    2.  Two calcified plaques are noted in the left vertebral artery,   otherwise unremarkable intracranial arterial circulation.    3.  The major cervical arteries appear normal.    4.  Bilateral pleural effusions are noted. A pacemaker is noted in place.

## 2018-03-23 NOTE — PROVIDER CONTACT NOTE (OTHER) - ASSESSMENT
Patient noted to have runs of vtach. Patient asymptomatic. No signs and symptoms of distress noted. Patient intubated and sedated as per MD order.

## 2018-03-23 NOTE — CONSULT NOTE ADULT - ASSESSMENT
63 M PMH of HFrEF 10-15% (ischemic), s/p AICD vs PPM, ?Afib, HTN, DM2 on insulin, CKD, and gout initally presented with septic shock 2/2 cellulitis c/b cardiogenic shock, became unresponsive after A-line placement yesterday. CT head negative for acute infarct. Pt now off sedation, still unresponsive to voice or painful stimuli.    - c/w vEEG  - MRI brain to assess for possible brain stem stroke. CTA without significant stenosis, though two calcified plaques noted in the left vertebral artery  - Neuro will continue to follow

## 2018-03-23 NOTE — PROGRESS NOTE ADULT - PROBLEM SELECTOR PLAN 2
Acute on chronic systolic CHF with LVEF 15 % and severely low blood pressure  Daily weight - please limit the fluid intake   - and follow up closely the fluid intake - please   - on Vassopresin now and Norepinephrine in the morning

## 2018-03-23 NOTE — PROGRESS NOTE ADULT - SUBJECTIVE AND OBJECTIVE BOX
HUNTER BRIZUELA   MRN-[id number]     (1955):     HPI:  64 yo M history of HFrEF 10-15% 2/2 ischemic cardiomyopathy, MI , s/p AICD vs PPM, ?Afib, Hypertension, Diabetes Mellitus Type 2 on insulin, CKD?and gout, who presents with a chief complaint of generalized weakness. Pt wad admitted to [facility] 2 months ago for gout and was sent to rehab. He was discharged home mid Feb with VNS. In the past 2 weeks patient has noted increased leg pain and weakness bilaterally. In the last few days, he has also experienced generalized weakness prompting him to come to ER.   In ER, noted to have t max of 102, SBP 70s, leukocytosis to 32.8, Lactate of 6.6, Acute renal failure with severe metabolic derangements. Given 3.5L fluids and Vanc/Zosyn. MICU consulted. (10 Mar 2018 18:51)    Pt has had a complicated course including pleural effusion with chest tube placement, renal failure with both CVVHD and then HD and significant hypotension requiring vasopressors.  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  Pt was converted back to CVVHD due to his HD related hypotension.   Last night, pt had an episode of unresponsiveness.  Pt was intubated for airway protection.  Imaging was negative for CVA.  VEEG in place.  Pt remains on CVVHD and is on vasopressin and levophed.   He is lightly sedated at this time and can respond weakly to intermittent simple commands.         ROS:   nonverbal as pt is intubated.   Unable to attain due to:  oral intubation and sedation                    Dyspnea (Heriberto 0-10):                       N/V (Y/N):                            Secretions (Y/N):              Agitation(Y/N):  Pain (Y/N):       -Provocation/Palliation:  -Quality/Quantity:  -Radiating:  -Severity:  -Timing/Frequency:  -Impact on ADLs:    Allergies:  No Known Allergies    Intolerances    Opiate Naive (Y/N):   yes  -iStop reviewed (Y/N):  yes ref # [id number]    MEDICATIONS  (STANDING):  aspirin  chewable 81 milliGRAM(s) Oral daily  atorvastatin 80 milliGRAM(s) Oral at bedtime  chlorhexidine 0.12% Liquid 15 milliLiter(s) Swish and Spit two times a day  chlorhexidine 2% Cloths 1 Application(s) Topical daily  dextrose 5%. 1000 milliLiter(s) (50 mL/Hr) IV Continuous <Continuous>  dextrose 50% Injectable 12.5 Gram(s) IV Push once  dextrose 50% Injectable 25 Gram(s) IV Push once  dextrose 50% Injectable 25 Gram(s) IV Push once  escitalopram 5 milliGRAM(s) Oral daily  folic acid 1 milliGRAM(s) Oral daily  hydrocortisone sodium succinate Injectable 50 milliGRAM(s) IV Push every 6 hours  insulin lispro (HumaLOG) corrective regimen sliding scale   SubCutaneous every 6 hours  multivitamin 1 Tablet(s) Oral daily  norepinephrine Infusion 0.01 MICROgram(s)/kG/Min (1.5 mL/Hr) IV Continuous <Continuous>  propofol Infusion 0.01 MICROgram(s)/kG/Min (0.005 mL/Hr) IV Continuous <Continuous>  PureFlow Dialysate RFP-400 (K 2 / Ca 3) 5000 milliLiter(s) (1500 mL/Hr) CRRT <Continuous>  senna 2 Tablet(s) Oral at bedtime  sodium chloride 0.9% lock flush 20 milliLiter(s) IV Push once  thiamine 100 milliGRAM(s) Oral daily  vasopressin Infusion 0.01 Unit(s)/Min (0.6 mL/Hr) IV Continuous <Continuous>    MEDICATIONS  (PRN):  acetaminophen   Tablet. 650 milliGRAM(s) Oral every 6 hours PRN Moderate Pain (4 - 6)  dextrose Gel 1 Dose(s) Oral once PRN Blood Glucose LESS THAN 70 milliGRAM(s)/deciliter  glucagon  Injectable 1 milliGRAM(s) IntraMuscular once PRN Glucose LESS THAN 70 milligrams/deciliter  sodium chloride 0.9% lock flush 10 milliLiter(s) IV Push every 1 hour PRN After each medication administration  sodium chloride 0.9% lock flush 10 milliLiter(s) IV Push every 12 hours PRN Lumen of catheter NOT used      Labs:                   9.3    16.9  )-----------( 137      ( 23 Mar 2018 05:50 )             29.1     03-    137  |  100  |  27<H>  ----------------------------<  87  3.2<L>   |  23  |  1.97<H>    Ca    8.7      23 Mar 2018 05:50  Phos  2.8       Mg     1.9     -    TPro  5.6<L>  /  Alb  3.1<L>  /  TBili  2.5<H>  /  DBili  x   /  AST  26  /  ALT  24  /  AlkPhos  104  -    Imaging:      Reviewed      EXAM:  NM INFLAMMATORY LOC GALLIUM WB                          PROCEDURE DATE:  2018         ******PRELIMINARY REPORT******    ******PRELIMINARY REPORT******      INTERPRETATION:  RESIDENT PRELIMINARY REPORT    INFLAMMATION IMAGING - WHOLE-BODY    Indication: Rule out infectious focus. Cellulitis not improving on   antibiotic therapy.    Procedure: The patient received an intravenous injection of 4.91 mCi of   gallium-67 citrate on date and images of the whole-body were obtained at   about 24 hours.    Findings: There is abnormally increased activity of the anterior and   posterior aspects of the left lower leg. Findings are suggestive of   cellulitis. No focal increased uptake is identified to suggest   phlegmon/abscess. There is slightly increased activity at the sacroiliac   joints bilaterally. No other abnormal uptake is identified.    Impression: Findings suggestive of cellulitis as above.    < end of copied text >    PEx:  ICU Vital Signs Last 24 Hrs  T(C): 35.2 (23 Mar 2018 10:30), Max: 36.1 (22 Mar 2018 14:00)  T(F): 95.3 (23 Mar 2018 10:30), Max: 97 (22 Mar 2018 14:00)  HR: 78 (23 Mar 2018 11:00) (72 - 114)  BP: 101/81 (23 Mar 2018 01:00) (67/59 - 101/81)  BP(mean): 88 (23 Mar 2018 01:00) (61 - 88)  ABP: 84/60 (23 Mar 2018 11:00) (84/60 - 132/92)  ABP(mean): 70 (23 Mar 2018 11:00) (70 - 104)  RR: 13 (23 Mar 2018 11:00) (10 - 34)  SpO2: 98% (23 Mar 2018 11:00) (95% - 100%)    General:  ill appearing, thin, not distressed -- sedated  HEENT:  nc/at, thin, slight temporal wasting, moist oral cavity, orally intubated  Neck:   supple, neg lymphadenopathy, + R HD catheter, L IJ TLC  CVS:  irreg, frequent ventricular ectopy, rate controlled, pacer noted to  L chest wall  Resp:  non-labored, vented  GI:   large, distended slightly, soft, + BS nontender, slight dependent edema  :   no barnes present  Musc:   weak, thin, follows commands  Ext: crusty, dressed lower extremities  Neuro: sedated  Psych:   mood appropriate for circumstances  Skin:  thin, dry, cool, + generalized edema  Lymph:  neg  Preadmit Karnofsky:   50 %           Current Karnofsky:   30    %  Cachexia (Y/N):   yes  BMI:  22    Advanced Directives:     Full Code     HCP noted on chart     DPOA  (for finances)       Decision maker:   pt was deemed not to have decision making capacity on  by psychiatry  Legal surrogate:  pts dgt Cristal Castro [phone] is pts HCP    Social History:   single, lives by self, pt has a HHA 4hrs/day, 3 days per week.  Pt has 2 adult children (Cristal aged 37) and pts son, is 24 and lives in [facility]. 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    He is retired but reports he is not on disability.   Pt is "spiritual" and practices a "[employer]" raquel background   Per notes, pt has not been compliant with his medication (not picked up prescription from his outpt pharmacy) since 2017.    GOALS OF CARE DISCUSSION:       Palliative care info/counseling provided-- following	           Family meeting- met with pts dgt who is HCP on        Advanced Directives addressed please see Advance Care Planning Note-- Ad dir reviewed.  I will continue to explore pts wishes re: advanced directives 	           Documentation of GOC: 	          REFERRALS:	        Palliative Med        Unit SW/Case Mgmt       - referred       Massage Therapy-- referred       Music Therapy-- referred       Nutrition-- following

## 2018-03-23 NOTE — PROGRESS NOTE ADULT - SUBJECTIVE AND OBJECTIVE BOX
the patient seen in the morning - intubated, off sedation, LAst night unresponsive and was intubated for that reason.  CVVHD still in progress - UF 50 ml/h, [facility], no barnes in. From the UF - 700 ml removed   The renal service follows the case for GILMER in the setting of septic shocked (improved) and CHF exacerbation. Still oliguric from ATN             Patient seen and examined at bedside.     acetaminophen   Tablet. 650 milliGRAM(s) every 6 hours PRN  aspirin  chewable 81 milliGRAM(s) daily  atorvastatin 80 milliGRAM(s) at bedtime  chlorhexidine 0.12% Liquid 15 milliLiter(s) two times a day  chlorhexidine 2% Cloths 1 Application(s) daily  dextrose 5%. 1000 milliLiter(s) <Continuous>  dextrose 50% Injectable 12.5 Gram(s) once  dextrose 50% Injectable 25 Gram(s) once  dextrose 50% Injectable 25 Gram(s) once  dextrose Gel 1 Dose(s) once PRN  escitalopram 5 milliGRAM(s) daily  folic acid 1 milliGRAM(s) daily  glucagon  Injectable 1 milliGRAM(s) once PRN  hydrocortisone sodium succinate Injectable 50 milliGRAM(s) every 6 hours  insulin lispro (HumaLOG) corrective regimen sliding scale   every 6 hours  multivitamin 1 Tablet(s) daily  norepinephrine Infusion 0.01 MICROgram(s)/kG/Min <Continuous>  propofol Infusion 0.01 MICROgram(s)/kG/Min <Continuous>  PureFlow Dialysate RFP-400 (K 2 / Ca 3) 5000 milliLiter(s) <Continuous>  senna 2 Tablet(s) at bedtime  sodium chloride 0.9% lock flush 10 milliLiter(s) every 1 hour PRN  sodium chloride 0.9% lock flush 10 milliLiter(s) every 12 hours PRN  sodium chloride 0.9% lock flush 20 milliLiter(s) once  thiamine 100 milliGRAM(s) daily  vasopressin Infusion 0.01 Unit(s)/Min <Continuous>      Allergies    heparin (Unknown)    Intolerances        T(C): , Max: 35.9 (03-22-18 @ 18:00)  T(F): , Max: 96.6 (03-22-18 @ 18:00)  HR: 80 (03-23-18 @ 14:00)  BP: 101/81 (03-23-18 @ 01:00)  BP(mean): 88 (03-23-18 @ 01:00)  RR: 13 (03-23-18 @ 14:00)  SpO2: 100% (03-23-18 @ 14:00)  Wt(kg): --    03-22 @ 07:01  -  03-23 @ 07:00  --------------------------------------------------------  IN:    norepinephrine Infusion: 103 mL    propofol Infusion: 80 mL    vasopressin Infusion: 57 mL  Total IN: 240 mL    OUT:    Intermittent Catheterization - Urethral: 300 mL    Other: 737 mL  Total OUT: 1037 mL    Total NET: -797 mL      03-23 @ 07:01  -  03-23 @ 14:32  --------------------------------------------------------  IN:    IV PiggyBack: 300 mL    norepinephrine Infusion: 30 mL    propofol Infusion: 7.2 mL    vasopressin Infusion: 12 mL  Total IN: 349.2 mL    OUT:    Other: 209 mL  Total OUT: 209 mL    Total NET: 140.2 mL      Physical exam:   Alert and oriented   No JVD   Normal air entry into the lungs, no wheezing, no crackles   RRR, normal s1, s2, no murmurs, rubs or gallops   Abdomen - soft, not tender, not distended   Extremities: no edema   HAs a HD catheter on the right of his neck        LABS:                        9.3    16.9  )-----------( 137      ( 23 Mar 2018 05:50 )             29.1     03-23    137  |  100  |  27<H>  ----------------------------<  87  3.2<L>   |  23  |  1.97<H>    Ca    8.7      23 Mar 2018 05:50  Phos  2.8     03-23  Mg     1.9     03-23    TPro  5.6<L>  /  Alb  3.1<L>  /  TBili  2.5<H>  /  DBili  x   /  AST  26  /  ALT  24  /  AlkPhos  104  03-22      PT/INR - ( 23 Mar 2018 05:50 )   PT: 24.9 sec;   INR: 2.21          PTT - ( 23 Mar 2018 05:50 )  PTT:56.2 sec          RADIOLOGY & ADDITIONAL STUDIES:

## 2018-03-23 NOTE — PROGRESS NOTE ADULT - ATTENDING COMMENTS
He has tolerated CVVHD with about 1.5L removed over the last few days. CVP remains 8-10 cm H2O and he has significant peripheral edema. MAPs 70s on Vaso and Levo, but now sedated with Propofol after being reintubated for seizure-like activity. Off midodrine now.    Would continue the current fluid removal with CVVHD and try to escalate, if possible, to 75 or 100 mL removed per hour. He does not currently need an inotrope at this time. Given rising WBC count, would consider repeating the cultures as this may be early sepsis.

## 2018-03-23 NOTE — PROGRESS NOTE ADULT - ASSESSMENT
62 yo M history of HFrEF 10-15% 2/2 ischemic cardiomyopathy, MI , s/p AICD vs PPM, ?Afib, Hypertension, Diabetes Mellitus Type 2 on insulin, CKD and gout, who presents with a chief complaint of generalized weakness admitted for severe sepsis 2/2 LE cellulitis vs pneumonia   Pt has developed renal failure (GILMER) and was started on CVVHD, converted to HD but is hypotensive with same.  Midodrine started.

## 2018-03-23 NOTE — CHART NOTE - NSCHARTNOTEFT_GEN_A_CORE
Admitting Diagnosis:   63M PMH HFrEF 10-15% (ischemic), MI, s/p AICD vs PPM, possible Afib, HTN, DM2 on insulin, possible CKD, and gout, who presents with a chief complaint of generalized weakness s/p septic shock likely 2/2 LE cellulitis.     PAST MEDICAL & SURGICAL HISTORY:  Type 2 diabetes mellitus with diabetic peripheral angiopathy without gangrene, with long-term curren  Essential hypertension, benign  Gout  Pacemaker  Chronic systolic heart failure  Myocardial infarction  No significant past surgical history    Current Nutrition Order: NPO    GI Issues: Last BM 3/22    Pain: GONSALO 2* vent    Skin Integrity: L buttocks-Stage 2    Labs:   03-23    137  |  100  |  27<H>  ----------------------------<  87  3.2<L>   |  23  |  1.97<H>    Ca    8.7      23 Mar 2018 05:50  Phos  2.8     03-23  Mg     1.9     03-23    TPro  5.6<L>  /  Alb  3.1<L>  /  TBili  2.5<H>  /  DBili  x   /  AST  26  /  ALT  24  /  AlkPhos  104  03-22    CAPILLARY BLOOD GLUCOSE  98 (22 Mar 2018 18:01)    POCT Blood Glucose.: 102 mg/dL (23 Mar 2018 11:05)  POCT Blood Glucose.: 105 mg/dL (23 Mar 2018 10:06)  POCT Blood Glucose.: 85 mg/dL (23 Mar 2018 08:26)  POCT Blood Glucose.: 74 mg/dL (23 Mar 2018 07:24)  POCT Blood Glucose.: 72 mg/dL (23 Mar 2018 05:45)  POCT Blood Glucose.: 96 mg/dL (22 Mar 2018 22:29)  POCT Blood Glucose.: 94 mg/dL (22 Mar 2018 17:41)  POCT Blood Glucose.: 104 mg/dL (22 Mar 2018 16:20)  POCT Blood Glucose.: 89 mg/dL (22 Mar 2018 11:45)    Medications:  MEDICATIONS  (STANDING):  aspirin  chewable 81 milliGRAM(s) Oral daily  atorvastatin 80 milliGRAM(s) Oral at bedtime  chlorhexidine 0.12% Liquid 15 milliLiter(s) Swish and Spit two times a day  chlorhexidine 2% Cloths 1 Application(s) Topical daily  dextrose 5%. 1000 milliLiter(s) (50 mL/Hr) IV Continuous <Continuous>  dextrose 50% Injectable 12.5 Gram(s) IV Push once  dextrose 50% Injectable 25 Gram(s) IV Push once  dextrose 50% Injectable 25 Gram(s) IV Push once  escitalopram 5 milliGRAM(s) Oral daily  folic acid 1 milliGRAM(s) Oral daily  hydrocortisone sodium succinate Injectable 50 milliGRAM(s) IV Push every 6 hours  insulin lispro (HumaLOG) corrective regimen sliding scale   SubCutaneous every 6 hours  multivitamin 1 Tablet(s) Oral daily  norepinephrine Infusion 0.01 MICROgram(s)/kG/Min (1.5 mL/Hr) IV Continuous <Continuous>  propofol Infusion 0.01 MICROgram(s)/kG/Min (0.005 mL/Hr) IV Continuous <Continuous>  PureFlow Dialysate RFP-400 (K 2 / Ca 3) 5000 milliLiter(s) (1500 mL/Hr) CRRT <Continuous>  senna 2 Tablet(s) Oral at bedtime  sodium chloride 0.9% lock flush 20 milliLiter(s) IV Push once  thiamine 100 milliGRAM(s) Oral daily  vasopressin Infusion 0.01 Unit(s)/Min (0.6 mL/Hr) IV Continuous <Continuous>    MEDICATIONS  (PRN):  acetaminophen   Tablet. 650 milliGRAM(s) Oral every 6 hours PRN Moderate Pain (4 - 6)  dextrose Gel 1 Dose(s) Oral once PRN Blood Glucose LESS THAN 70 milliGRAM(s)/deciliter  glucagon  Injectable 1 milliGRAM(s) IntraMuscular once PRN Glucose LESS THAN 70 milligrams/deciliter  sodium chloride 0.9% lock flush 10 milliLiter(s) IV Push every 1 hour PRN After each medication administration  sodium chloride 0.9% lock flush 10 milliLiter(s) IV Push every 12 hours PRN Lumen of catheter NOT used    Weight:  3/16 93.4 kg  3/17 85.4 kg  3/19 85.9 kg  3/20 90.1 kg    Weight Change: Wt fluctuates - likely 2* HD    Estimated energy needs using 89 kg IBW: Needs estimated 2/2 intubation  Calories: 25-30 kcal/kg = 0224-1445 kcal/day  Protein: 1.4-1.6 g/kg = 125-142 g protein/day  Fluids 500 mL + UOP 2/2 HD    Subjective: Pt now intubated since last RD visit. Currently on CVVHD. Per RN pt possibly to be extubated today, tolerating CPAP.     Previous Nutrition Diagnosis: Inadequate oral intake RT fair appetite AEB 50-75% PO intake at present    Active [   ]  Resolved [ X  ]     If resolved, new PES: Inadequate energy intake RT inability to take PO AEB NPO/intubation    Goal: Initiate nutrition within 24-48 hours    Recommendations:   1. Consider EN: Nepro at 55 mL x 24 hr (route per MD) to provide 1320 mL TV, 2376 kcal, 107 g protein, & 959 mL Fluid. Start at 30 mL, increase by 10 mL q4 to goal rate. Monitor for signs of intolerance & maintain aspiration precautions.   2. Trend post HD weights  3. Lytes to be monitored & repleted by primary team.     Education: N/A-vent    Risk Level: High [ X ] Moderate [   ] Low [   ]

## 2018-03-23 NOTE — PROGRESS NOTE ADULT - SUBJECTIVE AND OBJECTIVE BOX
Stroke Neurology Follow-Up Visit    Interval History: Patient more awake with dysconjugate gaze. Off all sedation and MAP still low. EEG shows no seizures but very slow        Exam:  T(F): 98.3 (03-23-18 @ 22:03), Max: 98.3 (03-23-18 @ 22:03)  HR: 80 (03-23-18 @ 22:00) (72 - 84)  BP: 84/71 (03-23-18 @ 21:45) (84/71 - 84/71)  RR: 13 (03-23-18 @ 23:00) (10 - 21)  SpO2: 100% (03-23-18 @ 23:00) (98% - 100%)  Wt(kg): --      MEDICATIONS  (STANDING):  aspirin  chewable 81 milliGRAM(s) Oral daily  atorvastatin 80 milliGRAM(s) Oral at bedtime  chlorhexidine 0.12% Liquid 15 milliLiter(s) Swish and Spit two times a day  chlorhexidine 2% Cloths 1 Application(s) Topical daily  dextrose 5%. 1000 milliLiter(s) (50 mL/Hr) IV Continuous <Continuous>  dextrose 50% Injectable 12.5 Gram(s) IV Push once  dextrose 50% Injectable 25 Gram(s) IV Push once  dextrose 50% Injectable 25 Gram(s) IV Push once  escitalopram 5 milliGRAM(s) Oral daily  folic acid 1 milliGRAM(s) Oral daily  hydrocortisone sodium succinate Injectable 50 milliGRAM(s) IV Push every 6 hours  insulin lispro (HumaLOG) corrective regimen sliding scale   SubCutaneous every 6 hours  magnesium sulfate  IVPB 1 Gram(s) IV Intermittent once  multivitamin 1 Tablet(s) Oral daily  norepinephrine Infusion 0.01 MICROgram(s)/kG/Min (1.5 mL/Hr) IV Continuous <Continuous>  potassium chloride  20 mEq/100 mL IVPB 20 milliEquivalent(s) IV Intermittent every 2 hours  propofol Infusion 0.01 MICROgram(s)/kG/Min (0.005 mL/Hr) IV Continuous <Continuous>  PureFlow Dialysate RFP-400 (K 2 / Ca 3) 5000 milliLiter(s) (1500 mL/Hr) CRRT <Continuous>  senna 2 Tablet(s) Oral at bedtime  sodium chloride 0.9% lock flush 20 milliLiter(s) IV Push once  thiamine 100 milliGRAM(s) Oral daily  vasopressin Infusion 0.01 Unit(s)/Min (0.6 mL/Hr) IV Continuous <Continuous>      opens eyes with dysconjugate gaze to the left and down. Pupils reactive, positive corneals, positive gag. No posturing.   Flaccid quadriplegia.      MEDICATIONS  (PRN):  acetaminophen   Tablet. 650 milliGRAM(s) Oral every 6 hours PRN Moderate Pain (4 - 6)  dextrose Gel 1 Dose(s) Oral once PRN Blood Glucose LESS THAN 70 milliGRAM(s)/deciliter  glucagon  Injectable 1 milliGRAM(s) IntraMuscular once PRN Glucose LESS THAN 70 milligrams/deciliter  sodium chloride 0.9% lock flush 10 milliLiter(s) IV Push every 1 hour PRN After each medication administration  sodium chloride 0.9% lock flush 10 milliLiter(s) IV Push every 12 hours PRN Lumen of catheter NOT used      Labs:                       03-23    131<L>  |  96  |  27<H>  ----------------------------<  118<H>  3.4<L>   |  21<L>  |  1.91<H>    Ca    8.4      23 Mar 2018 20:46  Phos  2.8     03-23  Mg     1.8     03-23    TPro  5.6<L>  /  Alb  3.1<L>  /  TBili  2.5<H>  /  DBili  x   /  AST  26  /  ALT  24  /  AlkPhos  104  03-22    Hemoglobin A1C, Whole Blood: 8.6 % (03-10 @ 23:31)  Thyroid Stimulating Hormone, Serum: 3.299 uIU/mL (03-21 @ 07:11)  Thyroid Stimulating Hormone, Serum: 4.429 uIU/mL (03-11 @ 14:10)    LIVER FUNCTIONS - ( 22 Mar 2018 19:06 )  Alb: 3.1 g/dL / Pro: 5.6 g/dL / ALK PHOS: 104 U/L / ALT: 24 U/L / AST: 26 U/L / GGT: x           PT/INR - ( 23 Mar 2018 05:50 )   PT: 24.9 sec;   INR: 2.21          PTT - ( 23 Mar 2018 05:50 )  PTT:56.2 sec    Radiology:    Assessment & Plan:    No evidence of acute basilar thrombus or bilateral ICA LVO so his sudden coma and/ or focal exam right now may be secondary to seizure or watershed stroke or brief hypoxic event due to decreased MAP or agonal breathing. The downgaze is reflective of pressure on the tectum of the midbrain and that might be due to PCA strokes . Recommend repeat CT head. in AM.   Check ammonia level. No AED at this time.

## 2018-03-23 NOTE — PROGRESS NOTE ADULT - SUBJECTIVE AND OBJECTIVE BOX
INTERVAL HPI/OVERNIGHT EVENTS: Pt more alert overnight. 3.6 lactate. 12 breats Vta    SUBJECTIVE: Patient seen and examined at bedside. intubated.     OBJECTIVE:    VITAL SIGNS:  ICU Vital Signs Last 24 Hrs  T(C): 35.2 (23 Mar 2018 10:30), Max: 36.1 (22 Mar 2018 14:00)  T(F): 95.3 (23 Mar 2018 10:30), Max: 97 (22 Mar 2018 14:00)  HR: 78 (23 Mar 2018 12:59) (72 - 114)  BP: 101/81 (23 Mar 2018 01:00) (67/59 - 101/81)  BP(mean): 88 (23 Mar 2018 01:00) (64 - 88)  ABP: 88/62 (23 Mar 2018 12:00) (84/60 - 132/92)  ABP(mean): 70 (23 Mar 2018 12:00) (70 - 104)  RR: 13 (23 Mar 2018 12:00) (10 - 34)  SpO2: 100% (23 Mar 2018 12:59) (95% - 100%)    Mode: AC/ CMV (Assist Control/ Continuous Mandatory Ventilation), RR (machine): 14, TV (machine): 450, FiO2: 40, PEEP: 5, ITime: 0.9, MAP: 7.7, PIP: 18    03-22 @ 07:01 - 03-23 @ 07:00  --------------------------------------------------------  IN: 240 mL / OUT: 1037 mL / NET: -797 mL    03-23 @ 07:01 - 03-23 @ 13:56  --------------------------------------------------------  IN: 349.2 mL / OUT: 209 mL / NET: 140.2 mL      CAPILLARY BLOOD GLUCOSE  98 (22 Mar 2018 18:01)      POCT Blood Glucose.: 102 mg/dL (23 Mar 2018 11:05)      PHYSICAL EXAM:    General: NAD  HEENT: NC/AT; PERRL, clear conjunctiva  Neck: supple  Respiratory: CTA b/l  Cardiovascular: +S1/S2; RRR  Abdomen: soft, NT/ND; +BS x4  Extremities:  no LE edema  Vascular: WWP, 2+ peripheral pulses b/l;  Skin: normal color and turgor; no rash  Neurological: A&Ox3, move all extremities. CN II-XII intact    MEDICATIONS:  MEDICATIONS  (STANDING):  aspirin  chewable 81 milliGRAM(s) Oral daily  atorvastatin 80 milliGRAM(s) Oral at bedtime  chlorhexidine 0.12% Liquid 15 milliLiter(s) Swish and Spit two times a day  chlorhexidine 2% Cloths 1 Application(s) Topical daily  dextrose 5%. 1000 milliLiter(s) (50 mL/Hr) IV Continuous <Continuous>  dextrose 50% Injectable 12.5 Gram(s) IV Push once  dextrose 50% Injectable 25 Gram(s) IV Push once  dextrose 50% Injectable 25 Gram(s) IV Push once  escitalopram 5 milliGRAM(s) Oral daily  folic acid 1 milliGRAM(s) Oral daily  hydrocortisone sodium succinate Injectable 50 milliGRAM(s) IV Push every 6 hours  insulin lispro (HumaLOG) corrective regimen sliding scale   SubCutaneous every 6 hours  multivitamin 1 Tablet(s) Oral daily  norepinephrine Infusion 0.01 MICROgram(s)/kG/Min (1.5 mL/Hr) IV Continuous <Continuous>  propofol Infusion 0.01 MICROgram(s)/kG/Min (0.005 mL/Hr) IV Continuous <Continuous>  PureFlow Dialysate RFP-400 (K 2 / Ca 3) 5000 milliLiter(s) (1500 mL/Hr) CRRT <Continuous>  senna 2 Tablet(s) Oral at bedtime  sodium chloride 0.9% lock flush 20 milliLiter(s) IV Push once  thiamine 100 milliGRAM(s) Oral daily  vasopressin Infusion 0.01 Unit(s)/Min (0.6 mL/Hr) IV Continuous <Continuous>    MEDICATIONS  (PRN):  acetaminophen   Tablet. 650 milliGRAM(s) Oral every 6 hours PRN Moderate Pain (4 - 6)  dextrose Gel 1 Dose(s) Oral once PRN Blood Glucose LESS THAN 70 milliGRAM(s)/deciliter  glucagon  Injectable 1 milliGRAM(s) IntraMuscular once PRN Glucose LESS THAN 70 milligrams/deciliter  sodium chloride 0.9% lock flush 10 milliLiter(s) IV Push every 1 hour PRN After each medication administration  sodium chloride 0.9% lock flush 10 milliLiter(s) IV Push every 12 hours PRN Lumen of catheter NOT used      ALLERGIES:  Allergies    heparin (Unknown)    Intolerances        LABS:                        9.3    16.9  )-----------( 137      ( 23 Mar 2018 05:50 )             29.1     03-23    137  |  100  |  27<H>  ----------------------------<  87  3.2<L>   |  23  |  1.97<H>    Ca    8.7      23 Mar 2018 05:50  Phos  2.8     03-23  Mg     1.9     03-23    TPro  5.6<L>  /  Alb  3.1<L>  /  TBili  2.5<H>  /  DBili  x   /  AST  26  /  ALT  24  /  AlkPhos  104  03-22    PT/INR - ( 23 Mar 2018 05:50 )   PT: 24.9 sec;   INR: 2.21          PTT - ( 23 Mar 2018 05:50 )  PTT:56.2 sec      RADIOLOGY & ADDITIONAL TESTS: Reviewed. INTERVAL HPI/OVERNIGHT EVENTS: Pt more alert overnight. 3.6 lactate. 12 breats [facility]    SUBJECTIVE: Patient seen and examined at bedside. intubated and sedated     OBJECTIVE:    VITAL SIGNS:  ICU Vital Signs Last 24 Hrs  T(C): 35.2 (23 Mar 2018 10:30), Max: 36.1 (22 Mar 2018 14:00)  T(F): 95.3 (23 Mar 2018 10:30), Max: 97 (22 Mar 2018 14:00)  HR: 78 (23 Mar 2018 12:59) (72 - 114)  BP: 101/81 (23 Mar 2018 01:00) (67/59 - 101/81)  BP(mean): 88 (23 Mar 2018 01:00) (64 - 88)  ABP: 88/62 (23 Mar 2018 12:00) (84/60 - 132/92)  ABP(mean): 70 (23 Mar 2018 12:00) (70 - 104)  RR: 13 (23 Mar 2018 12:00) (10 - 34)  SpO2: 100% (23 Mar 2018 12:59) (95% - 100%)    Mode: AC/ CMV (Assist Control/ Continuous Mandatory Ventilation), RR (machine): 14, TV (machine): 450, FiO2: 40, PEEP: 5, ITime: 0.9, MAP: 7.7, PIP: 18    03-22 @ 07:01 - 03-23 @ 07:00  --------------------------------------------------------  IN: 240 mL / OUT: 1037 mL / NET: -797 mL    03-23 @ 07:01 - 03-23 @ 13:56  --------------------------------------------------------  IN: 349.2 mL / OUT: 209 mL / NET: 140.2 mL      CAPILLARY BLOOD GLUCOSE  98 (22 Mar 2018 18:01)      POCT Blood Glucose.: 102 mg/dL (23 Mar 2018 11:05)      PHYSICAL EXAM:  General: Intubated and sedated   HEENT: Pupils non reactive   Neck: supple. R HD cath L IJ   Respiratory: CTA b/l   Cardiac: +S1/S2, ii/vi systolic murmur   Gastrointestinal: Soft nd/nt, no rebound or guarding. +BS  Genitourinary: Testicular edema   Extremities: 1+ edema. warm to touch.  b/l areas of venous stasis. RLE with dry dressing  Skin: no rash  MSK: no joint swelling  Vasc: 1+ DP pulses      MEDICATIONS:  MEDICATIONS  (STANDING):  aspirin  chewable 81 milliGRAM(s) Oral daily  atorvastatin 80 milliGRAM(s) Oral at bedtime  chlorhexidine 0.12% Liquid 15 milliLiter(s) Swish and Spit two times a day  chlorhexidine 2% Cloths 1 Application(s) Topical daily  dextrose 5%. 1000 milliLiter(s) (50 mL/Hr) IV Continuous <Continuous>  dextrose 50% Injectable 12.5 Gram(s) IV Push once  dextrose 50% Injectable 25 Gram(s) IV Push once  dextrose 50% Injectable 25 Gram(s) IV Push once  escitalopram 5 milliGRAM(s) Oral daily  folic acid 1 milliGRAM(s) Oral daily  hydrocortisone sodium succinate Injectable 50 milliGRAM(s) IV Push every 6 hours  insulin lispro (HumaLOG) corrective regimen sliding scale   SubCutaneous every 6 hours  multivitamin 1 Tablet(s) Oral daily  norepinephrine Infusion 0.01 MICROgram(s)/kG/Min (1.5 mL/Hr) IV Continuous <Continuous>  propofol Infusion 0.01 MICROgram(s)/kG/Min (0.005 mL/Hr) IV Continuous <Continuous>  PureFlow Dialysate RFP-400 (K 2 / Ca 3) 5000 milliLiter(s) (1500 mL/Hr) CRRT <Continuous>  senna 2 Tablet(s) Oral at bedtime  sodium chloride 0.9% lock flush 20 milliLiter(s) IV Push once  thiamine 100 milliGRAM(s) Oral daily  vasopressin Infusion 0.01 Unit(s)/Min (0.6 mL/Hr) IV Continuous <Continuous>    MEDICATIONS  (PRN):  acetaminophen   Tablet. 650 milliGRAM(s) Oral every 6 hours PRN Moderate Pain (4 - 6)  dextrose Gel 1 Dose(s) Oral once PRN Blood Glucose LESS THAN 70 milliGRAM(s)/deciliter  glucagon  Injectable 1 milliGRAM(s) IntraMuscular once PRN Glucose LESS THAN 70 milligrams/deciliter  sodium chloride 0.9% lock flush 10 milliLiter(s) IV Push every 1 hour PRN After each medication administration  sodium chloride 0.9% lock flush 10 milliLiter(s) IV Push every 12 hours PRN Lumen of catheter NOT used      ALLERGIES:  Allergies    heparin (Unknown)    Intolerances        LABS:                        9.3    16.9  )-----------( 137      ( 23 Mar 2018 05:50 )             29.1     03-23    137  |  100  |  27<H>  ----------------------------<  87  3.2<L>   |  23  |  1.97<H>    Ca    8.7      23 Mar 2018 05:50  Phos  2.8     03-23  Mg     1.9     03-23    TPro  5.6<L>  /  Alb  3.1<L>  /  TBili  2.5<H>  /  DBili  x   /  AST  26  /  ALT  24  /  AlkPhos  104  03-22    PT/INR - ( 23 Mar 2018 05:50 )   PT: 24.9 sec;   INR: 2.21          PTT - ( 23 Mar 2018 05:50 )  PTT:56.2 sec      RADIOLOGY & ADDITIONAL TESTS: Reviewed.

## 2018-03-24 LAB
ANION GAP SERPL CALC-SCNC: 13 MMOL/L — SIGNIFICANT CHANGE UP (ref 5–17)
ANION GAP SERPL CALC-SCNC: 13 MMOL/L — SIGNIFICANT CHANGE UP (ref 5–17)
ANION GAP SERPL CALC-SCNC: 14 MMOL/L — SIGNIFICANT CHANGE UP (ref 5–17)
APTT BLD: 55.3 SEC — HIGH (ref 27.5–37.4)
BUN SERPL-MCNC: 26 MG/DL — HIGH (ref 7–23)
BUN SERPL-MCNC: 27 MG/DL — HIGH (ref 7–23)
BUN SERPL-MCNC: 28 MG/DL — HIGH (ref 7–23)
CALCIUM SERPL-MCNC: 8.3 MG/DL — LOW (ref 8.4–10.5)
CALCIUM SERPL-MCNC: 8.7 MG/DL — SIGNIFICANT CHANGE UP (ref 8.4–10.5)
CALCIUM SERPL-MCNC: 8.7 MG/DL — SIGNIFICANT CHANGE UP (ref 8.4–10.5)
CHLORIDE SERPL-SCNC: 98 MMOL/L — SIGNIFICANT CHANGE UP (ref 96–108)
CHLORIDE SERPL-SCNC: 99 MMOL/L — SIGNIFICANT CHANGE UP (ref 96–108)
CHLORIDE SERPL-SCNC: 99 MMOL/L — SIGNIFICANT CHANGE UP (ref 96–108)
CO2 SERPL-SCNC: 22 MMOL/L — SIGNIFICANT CHANGE UP (ref 22–31)
CO2 SERPL-SCNC: 23 MMOL/L — SIGNIFICANT CHANGE UP (ref 22–31)
CO2 SERPL-SCNC: 23 MMOL/L — SIGNIFICANT CHANGE UP (ref 22–31)
CREAT SERPL-MCNC: 1.82 MG/DL — HIGH (ref 0.5–1.3)
CREAT SERPL-MCNC: 1.84 MG/DL — HIGH (ref 0.5–1.3)
CREAT SERPL-MCNC: 1.85 MG/DL — HIGH (ref 0.5–1.3)
GAS PNL BLDV: SIGNIFICANT CHANGE UP
GLUCOSE BLDC GLUCOMTR-MCNC: 100 MG/DL — HIGH (ref 70–99)
GLUCOSE BLDC GLUCOMTR-MCNC: 107 MG/DL — HIGH (ref 70–99)
GLUCOSE BLDC GLUCOMTR-MCNC: 216 MG/DL — HIGH (ref 70–99)
GLUCOSE BLDC GLUCOMTR-MCNC: 270 MG/DL — HIGH (ref 70–99)
GLUCOSE SERPL-MCNC: 180 MG/DL — HIGH (ref 70–99)
GLUCOSE SERPL-MCNC: 245 MG/DL — HIGH (ref 70–99)
GLUCOSE SERPL-MCNC: 276 MG/DL — HIGH (ref 70–99)
HCT VFR BLD CALC: 26.8 % — LOW (ref 39–50)
HCT VFR BLD CALC: 28.2 % — LOW (ref 39–50)
HCT VFR BLD CALC: 29 % — LOW (ref 39–50)
HCT VFR BLD CALC: 31.1 % — LOW (ref 39–50)
HGB BLD-MCNC: 8.3 G/DL — LOW (ref 13–17)
HGB BLD-MCNC: 8.9 G/DL — LOW (ref 13–17)
HGB BLD-MCNC: 9.2 G/DL — LOW (ref 13–17)
HGB BLD-MCNC: 9.6 G/DL — LOW (ref 13–17)
INR BLD: 1.97 — HIGH (ref 0.88–1.16)
MAGNESIUM SERPL-MCNC: 2.1 MG/DL — SIGNIFICANT CHANGE UP (ref 1.6–2.6)
MCHC RBC-ENTMCNC: 30.1 PG — SIGNIFICANT CHANGE UP (ref 27–34)
MCHC RBC-ENTMCNC: 30.2 PG — SIGNIFICANT CHANGE UP (ref 27–34)
MCHC RBC-ENTMCNC: 30.6 PG — SIGNIFICANT CHANGE UP (ref 27–34)
MCHC RBC-ENTMCNC: 30.9 G/DL — LOW (ref 32–36)
MCHC RBC-ENTMCNC: 31 G/DL — LOW (ref 32–36)
MCHC RBC-ENTMCNC: 31.1 PG — SIGNIFICANT CHANGE UP (ref 27–34)
MCHC RBC-ENTMCNC: 31.6 G/DL — LOW (ref 32–36)
MCHC RBC-ENTMCNC: 31.7 G/DL — LOW (ref 32–36)
MCV RBC AUTO: 96.3 FL — SIGNIFICANT CHANGE UP (ref 80–100)
MCV RBC AUTO: 97.5 FL — SIGNIFICANT CHANGE UP (ref 80–100)
MCV RBC AUTO: 97.5 FL — SIGNIFICANT CHANGE UP (ref 80–100)
MCV RBC AUTO: 98.6 FL — SIGNIFICANT CHANGE UP (ref 80–100)
PHOSPHATE SERPL-MCNC: 2.8 MG/DL — SIGNIFICANT CHANGE UP (ref 2.5–4.5)
PLATELET # BLD AUTO: 105 K/UL — LOW (ref 150–400)
PLATELET # BLD AUTO: 111 K/UL — LOW (ref 150–400)
PLATELET # BLD AUTO: 91 K/UL — LOW (ref 150–400)
PLATELET # BLD AUTO: 93 K/UL — LOW (ref 150–400)
POTASSIUM SERPL-MCNC: 3.9 MMOL/L — SIGNIFICANT CHANGE UP (ref 3.5–5.3)
POTASSIUM SERPL-MCNC: 4.1 MMOL/L — SIGNIFICANT CHANGE UP (ref 3.5–5.3)
POTASSIUM SERPL-MCNC: 4.2 MMOL/L — SIGNIFICANT CHANGE UP (ref 3.5–5.3)
POTASSIUM SERPL-SCNC: 3.9 MMOL/L — SIGNIFICANT CHANGE UP (ref 3.5–5.3)
POTASSIUM SERPL-SCNC: 4.1 MMOL/L — SIGNIFICANT CHANGE UP (ref 3.5–5.3)
POTASSIUM SERPL-SCNC: 4.2 MMOL/L — SIGNIFICANT CHANGE UP (ref 3.5–5.3)
PROTHROM AB SERPL-ACNC: 22.2 SEC — HIGH (ref 9.8–12.7)
RBC # BLD: 2.75 M/UL — LOW (ref 4.2–5.8)
RBC # BLD: 2.86 M/UL — LOW (ref 4.2–5.8)
RBC # BLD: 3.01 M/UL — LOW (ref 4.2–5.8)
RBC # BLD: 3.19 M/UL — LOW (ref 4.2–5.8)
RBC # FLD: 18.9 % — HIGH (ref 10.3–16.9)
RBC # FLD: 19 % — HIGH (ref 10.3–16.9)
RBC # FLD: 19.1 % — HIGH (ref 10.3–16.9)
RBC # FLD: 19.4 % — HIGH (ref 10.3–16.9)
SODIUM SERPL-SCNC: 134 MMOL/L — LOW (ref 135–145)
SODIUM SERPL-SCNC: 135 MMOL/L — SIGNIFICANT CHANGE UP (ref 135–145)
SODIUM SERPL-SCNC: 135 MMOL/L — SIGNIFICANT CHANGE UP (ref 135–145)
WBC # BLD: 10 K/UL — SIGNIFICANT CHANGE UP (ref 3.8–10.5)
WBC # BLD: 10.8 K/UL — HIGH (ref 3.8–10.5)
WBC # BLD: 11.5 K/UL — HIGH (ref 3.8–10.5)
WBC # BLD: 12.2 K/UL — HIGH (ref 3.8–10.5)
WBC # FLD AUTO: 10 K/UL — SIGNIFICANT CHANGE UP (ref 3.8–10.5)
WBC # FLD AUTO: 10.8 K/UL — HIGH (ref 3.8–10.5)
WBC # FLD AUTO: 11.5 K/UL — HIGH (ref 3.8–10.5)
WBC # FLD AUTO: 12.2 K/UL — HIGH (ref 3.8–10.5)

## 2018-03-24 PROCEDURE — 95951: CPT | Mod: 26

## 2018-03-24 PROCEDURE — 99232 SBSQ HOSP IP/OBS MODERATE 35: CPT

## 2018-03-24 PROCEDURE — 99233 SBSQ HOSP IP/OBS HIGH 50: CPT

## 2018-03-24 PROCEDURE — 71045 X-RAY EXAM CHEST 1 VIEW: CPT | Mod: 26

## 2018-03-24 RX ORDER — WARFARIN SODIUM 2.5 MG/1
3 TABLET ORAL ONCE
Qty: 0 | Refills: 0 | Status: COMPLETED | OUTPATIENT
Start: 2018-03-24 | End: 2018-03-24

## 2018-03-24 RX ADMIN — Medication 1 MILLIGRAM(S): at 11:27

## 2018-03-24 RX ADMIN — ESCITALOPRAM OXALATE 5 MILLIGRAM(S): 10 TABLET, FILM COATED ORAL at 11:26

## 2018-03-24 RX ADMIN — Medication 1.5 MICROGRAM(S)/KG/MIN: at 19:14

## 2018-03-24 RX ADMIN — Medication 4: at 11:27

## 2018-03-24 RX ADMIN — CHLORHEXIDINE GLUCONATE 15 MILLILITER(S): 213 SOLUTION TOPICAL at 05:15

## 2018-03-24 RX ADMIN — Medication 50 MILLIEQUIVALENT(S): at 01:23

## 2018-03-24 RX ADMIN — Medication 50 MILLIGRAM(S): at 23:07

## 2018-03-24 RX ADMIN — Medication 50 MILLIGRAM(S): at 05:14

## 2018-03-24 RX ADMIN — CHLORHEXIDINE GLUCONATE 15 MILLILITER(S): 213 SOLUTION TOPICAL at 19:14

## 2018-03-24 RX ADMIN — Medication 50 MILLIGRAM(S): at 19:14

## 2018-03-24 RX ADMIN — Medication 100 MILLIGRAM(S): at 11:27

## 2018-03-24 RX ADMIN — PROPOFOL 0.01 MICROGRAM(S)/KG/MIN: 10 INJECTION, EMULSION INTRAVENOUS at 05:43

## 2018-03-24 RX ADMIN — ATORVASTATIN CALCIUM 80 MILLIGRAM(S): 80 TABLET, FILM COATED ORAL at 23:08

## 2018-03-24 RX ADMIN — SENNA PLUS 2 TABLET(S): 8.6 TABLET ORAL at 23:07

## 2018-03-24 RX ADMIN — Medication 81 MILLIGRAM(S): at 11:27

## 2018-03-24 RX ADMIN — CHLORHEXIDINE GLUCONATE 1 APPLICATION(S): 213 SOLUTION TOPICAL at 11:28

## 2018-03-24 RX ADMIN — Medication 6: at 19:14

## 2018-03-24 RX ADMIN — Medication 50 MILLIEQUIVALENT(S): at 03:00

## 2018-03-24 RX ADMIN — WARFARIN SODIUM 3 MILLIGRAM(S): 2.5 TABLET ORAL at 23:07

## 2018-03-24 RX ADMIN — Medication 1 TABLET(S): at 11:26

## 2018-03-24 RX ADMIN — Medication 50 MILLIGRAM(S): at 11:26

## 2018-03-24 RX ADMIN — Medication 100 GRAM(S): at 04:19

## 2018-03-24 NOTE — PROGRESS NOTE ADULT - ASSESSMENT
62 yo M history of HFrEF 10-15% 2/2 ischemic cardiomyopathy, MI , s/p AICD vs PPM, ?Afib, Hypertension, Diabetes Mellitus Type 2 on insulin, CKD?and gout, who presents with a chief complaint of generalized weakness. Now shakira - atn o n CVVHD for managing the fluid status - we will continue today with CVVHD - 75 ml/h

## 2018-03-24 NOTE — PROGRESS NOTE ADULT - ASSESSMENT
63M PMH HFrEF 10-15% (ischemic), MI, s/p AICD vs PPM, possible Afib, HTN, DM2 on insulin, possible CKD, and gout, who presents with a chief complaint of generalized weakness s/p septic shock likely 2/2 LE cellulitis. Now with AMS     NEURO  #AMS  - Pt non responsive 2 days ago. . VItal signs normal. Pt intubated for airway protection. Stroke code called. CT, CTA negative. VEEG in place- moderate slowing but no seizure activity.   - Pt more awake today, follows commands.   - Will obtain CT head repeat. Will check MRI compatibility with [employer] company.  -  TTE with [employer] shows no clot.   - Will extubate if patient alert during the day and follows commands.     #   ID- septic shock 2/2 Cellulitis   -Shock initially resolved however pt persistently hypoglycemic, hypothermic with AMS.   - Restarted on Levophed and Vasopressin given low BP. Will try to wean off Levophed.   - c/w steroids 50 q6h.   -. s/p Zosyn (3/11-2/21). Was previously also on Vanco (3/11-3/17). Will discuss restarting abx is status does not change   - Will obtain RUQ dopper to rule out portal vein thrombosis   - TTE with no vegetations. Pt not stable for RUKHSANA. Will obtain TTE with affinity to r/o Lv thrombus   - Gallium scan read shows LLE cellulitis. ,     - repeat LE CT does not show abscess or gas.   - R lung Effusion likely from overload. s/p thoracentesis with improvement in resp status   -  HIV negative  - Vascular surgery on board. Recommending Amputation as an option if pt continues to be unstable.     CARDIOVASCULAR   # HYPOTENSION-   - c/w Levophed and Vasopressin drip. Will maintain SBP>90. Will discuss starting Inotropes with cardiology   -  Will continue CVVHD to help reduce CVP   - pending transfer to CCU  - Gallium scan with only LLE cellulitis   - Lactate downtrended to WNL   - CHF with severely reduced EF 15%, caution with fluids. Per renal recs, can give IV albumin for fluids.    # CHF exacerbation  - CHF with EF 10-15% per pt 2/2 ischemic cardiomyopathy s/p AICD as indicated by CXR   - Elevated BNP on admission with R sided effusion and edema  - Cardiac shock causing hypotension   - Started on vasopressin per CHF team recs. Added Levophed given persistently low pressures.   - Persistent pulm congestion noted on chest Xray. c/w CVVHD  - c./w Hydralazine 5mg  q6h and Isordil 5mg tid as tolerated   - Give fluids judiciously given low EF in setting of likely sepsis  - Unclear medical regimen opt. Per [facility] pharmacy ( and [place]) pt has not picked up Torsemide 20 or Metoprolol 100 since November.   - Hold ACE/Metoprolol in setting of sepsis  - CVO saturation improved after transfusion    #AFIB  - PT with hx of Afib and LV thrombus on coumadin.    -d/c  Hep drip given concern of HIT.d/c Argatroban.  - Dose coumadin daily. OG tube placed.    - Pt s/p FFP (3/13,3/14) and Vit K (3/13) for thoracocentesis and HD catheter placement.   - Will obtain TTE with affinity given hx of LV thrombus and now with AMS        #CAD- Hx of MI s/p AICD  - Pt with pLAD in 7/2017, was started on Aspirin and Brelinta per records.  - Will start on asp 81, Atorvastatin 80   - c/w Coumadin     # LE Edema   - LE edema with chronic venous stasis.   - Duplex does not show DVT    PULMONARY   Intubated  - Intubated on 3/22 for airway protection   - Will attempt CPAP trial and extubation     #Acute resp failure- Resolved   - Intubated for airway protection   - pt with R loculated plural effusion noted to have increased work of breathing. Likely 2/2 fluid overload, Fluid studies consistent with exudative effusion.   - s/p thoracentesis on 3/13 with R chest tube placement. Chest tube removed 3/15.   - s/p  pRBC transfusion on 3/12. 3/16  - Monitor resp status  - Appreciate CHF team recs regarding fluid status. Continue dialysis for fluid removal      #RENAL   #GILMER- 2/2 ischemic ATN  -Unknown baseline Cr presenting with Cr 3.93 with metabolic derangements.   - Likely 2/2 Sepsis, low intravascular volume and CHF  - Trend BUN and Cr.  - Renal team on board, pt with R HD catheter placed by IR on 3/21. c/w CVVHD currently removing 75cc/hour   - CT abdomen and pelvis without acute obstruction.   - retroperitoneal ultrasound showing medical renal disease.     #Hyperkalemia   -Resolved   - PT with elevated K to 5.7 on admission,  no EKG changes   - Continue telemetry monitoring  - Trend K   - Can given Kayexalate if persists   - c/w dialysis     #Metabolic acidosis   - 2/2 Lactate, starvation ketoacidosis and uremia   - Increased overnight. No resolved       #GI   - pt wit Elevated Bilirubin and Alk phos. Abd exam benign  - CT abdomen/pelvis consistent with cholelithiasis and ascites.     #HEME    #Thrombocytopenia  - ELVIRA negative but PF4 was positive. Unlikely HIT.    - Could have be related to Vanc/zosyn. Trend platelet count now that both are discontinued.   - Sepsis also in differential.  Improving      # elevated INR. Unclear etiology. Pt on coumadin?   - Continue with daily coumadin dosing   - Given Vit K and FFP3/12. FFP 3/13   - Monitor coags    #Anemia  - Stable at 8-9. Anemia of chronic disease.  -s/p 1pRBC on 3/12, 3/16   - WIll keep Hb around 9-10     #ENDOCRINE   - S/p insulin drip  - Decreasing Lantus to 5U as we taper down steroids. Monitor blood glucose and ISS coverage. pt with episodes of hypoglycemia.   - Monitor blood glucose   - A1C 8.6%      F: No IVF   E: Replete K<4 Mg<2, caution in setting of acute renal failure  N: OG tube placed, start feeds     Lines:  ETT (3/22), OG tube (3/21)R HD catheter (3/21). L IJ 3/21, PICC line 3/20    Full code   Dispo: MICU  Ppx: Coumadin

## 2018-03-24 NOTE — PROGRESS NOTE ADULT - PROBLEM SELECTOR PLAN 1
- oliguric GILMER from ATN from septic shock   - On CVVHD - 75 ml/h - we will continue wit that   - according to the chart negative fluid status 24 hours   - volume status on the wet side   - in and outs   - daily weight   - electrolytes noted- potassium and phosphate - noted

## 2018-03-24 NOTE — PROGRESS NOTE ADULT - ATTENDING COMMENTS
Patient examined, fellow's hx and PE reviewed and confirmed. I find GILMER, hypotensive, on CVVHD. A/P reviewed and confirmed. Continue CVVHD. See full note.

## 2018-03-24 NOTE — PROGRESS NOTE ADULT - SUBJECTIVE AND OBJECTIVE BOX
the patient seen in the morning - intubated, still on CVVHD no issues, UF - 75 ml/h, no urine output according to the nurse      Patient seen and examined at bedside.     acetaminophen   Tablet. 650 milliGRAM(s) every 6 hours PRN  aspirin  chewable 81 milliGRAM(s) daily  atorvastatin 80 milliGRAM(s) at bedtime  chlorhexidine 0.12% Liquid 15 milliLiter(s) two times a day  chlorhexidine 2% Cloths 1 Application(s) daily  dextrose 5%. 1000 milliLiter(s) <Continuous>  dextrose 50% Injectable 12.5 Gram(s) once  dextrose 50% Injectable 25 Gram(s) once  dextrose 50% Injectable 25 Gram(s) once  dextrose Gel 1 Dose(s) once PRN  escitalopram 5 milliGRAM(s) daily  folic acid 1 milliGRAM(s) daily  glucagon  Injectable 1 milliGRAM(s) once PRN  hydrocortisone sodium succinate Injectable 50 milliGRAM(s) every 6 hours  insulin lispro (HumaLOG) corrective regimen sliding scale   every 6 hours  multivitamin 1 Tablet(s) daily  norepinephrine Infusion 0.01 MICROgram(s)/kG/Min <Continuous>  propofol Infusion 0.01 MICROgram(s)/kG/Min <Continuous>  PureFlow Dialysate RFP-400 (K 2 / Ca 3) 5000 milliLiter(s) <Continuous>  senna 2 Tablet(s) at bedtime  sodium chloride 0.9% lock flush 10 milliLiter(s) every 1 hour PRN  sodium chloride 0.9% lock flush 10 milliLiter(s) every 12 hours PRN  sodium chloride 0.9% lock flush 20 milliLiter(s) once  thiamine 100 milliGRAM(s) daily  vasopressin Infusion 0.01 Unit(s)/Min <Continuous>      Allergies    Allergy Status Unknown    Intolerances        T(C): , Max: 36.8 (03-23-18 @ 22:03)  T(F): , Max: 98.3 (03-23-18 @ 22:03)  HR: 92 (03-24-18 @ 12:00)  BP: 84/71 (03-23-18 @ 21:45)  BP(mean): 76 (03-23-18 @ 21:45)  RR: 14 (03-24-18 @ 12:00)  SpO2: 100% (03-24-18 @ 12:00)  Wt(kg): --    03-23 @ 07:01 - 03-24 @ 07:00  --------------------------------------------------------  IN:    IV PiggyBack: 700 mL    Nepro with Carb Steady: 390 mL    norepinephrine Infusion: 114 mL    propofol Infusion: 96 mL    vasopressin Infusion: 42.6 mL  Total IN: 1342.6 mL    OUT:    Other: 1170 mL  Total OUT: 1170 mL    Total NET: 172.6 mL      03-24 @ 07:01 - 03-24 @ 12:21  --------------------------------------------------------  IN:    Nepro with Carb Steady: 55 mL    norepinephrine Infusion: 6 mL    propofol Infusion: 2.4 mL  Total IN: 63.4 mL    OUT:    Other: 73 mL  Total OUT: 73 mL    Total NET: -9.6 mL      Physical exam:   Alert and oriented   No JVD   Normal air entry into the lungs, no wheezing, no crackles   RRR, normal s1, s2, no murmurs, rubs or gallops   Abdomen - soft, not tender, not distended   Extremities: no edema   HAs a HD catheter on the right of his neck          LABS:                        9.6    11.5  )-----------( 111      ( 24 Mar 2018 05:45 )             31.1     03-24    134<L>  |  98  |  26<H>  ----------------------------<  180<H>  4.1   |  22  |  1.82<H>    Ca    8.7      24 Mar 2018 05:46  Phos  2.8     03-24  Mg     2.1     03-24    TPro  5.6<L>  /  Alb  3.1<L>  /  TBili  2.5<H>  /  DBili  x   /  AST  26  /  ALT  24  /  AlkPhos  104  03-22      PT/INR - ( 24 Mar 2018 05:45 )   PT: 22.2 sec;   INR: 1.97          PTT - ( 24 Mar 2018 05:45 )  PTT:55.3 sec          RADIOLOGY & ADDITIONAL STUDIES:

## 2018-03-24 NOTE — PROGRESS NOTE ADULT - SUBJECTIVE AND OBJECTIVE BOX
INTERVAL HPI/OVERNIGHT EVENTS: 13 beats vTach     SUBJECTIVE: Patient seen and examined at bedside. Intubated. More arousable and following commands later in the morning     OBJECTIVE:    VITAL SIGNS:  ICU Vital Signs Last 24 Hrs  T(C): 34.9 (24 Mar 2018 06:00), Max: 36.8 (23 Mar 2018 22:03)  T(F): 94.8 (24 Mar 2018 06:00), Max: 98.3 (23 Mar 2018 22:03)  HR: 78 (24 Mar 2018 10:00) (70 - 84)  BP: 84/71 (23 Mar 2018 21:45) (84/71 - 84/71)  BP(mean): 76 (23 Mar 2018 21:45) (76 - 76)  ABP: 92/64 (24 Mar 2018 10:00) (78/56 - 98/70)  ABP(mean): 74 (24 Mar 2018 10:00) (62 - 80)  RR: 10 (24 Mar 2018 10:00) (6 - 14)  SpO2: 100% (24 Mar 2018 10:00) (97% - 100%)    Mode: CPAP with PS, FiO2: 40, PEEP: 5, PS: 10, MAP: 7.5, PIP: 15    03-23 @ 07:01 - 03-24 @ 07:00  --------------------------------------------------------  IN: 1342.6 mL / OUT: 1170 mL / NET: 172.6 mL    03-24 @ 07:01 - 03-24 @ 12:15  --------------------------------------------------------  IN: 63.4 mL / OUT: 73 mL / NET: -9.6 mL      CAPILLARY BLOOD GLUCOSE  98 (22 Mar 2018 18:01)      POCT Blood Glucose.: 216 mg/dL (24 Mar 2018 11:09)      PHYSICAL EXAM:    General: Intubated follows simple commands  HEENT: NC/AT; PERRL, clear conjunctiva  Neck: supple, R HD cath, L IJ  Respiratory: Diffuse rhonchi  Cardiovascular: +S1/S2; RRR, ii/vi systolic murmur   Abdomen: soft, NT/ND; +BS x4  : Decreased testicular edema   Extremities:  2+ LE edema. b/l venous stasis changes.   Vascular: WWP, 2+ peripheral pulses b/l;  Skin: normal color and turgor; no rash  Neurological: A&Ox3, move all extremities. CN II-XII intact    MEDICATIONS:  MEDICATIONS  (STANDING):  aspirin  chewable 81 milliGRAM(s) Oral daily  atorvastatin 80 milliGRAM(s) Oral at bedtime  chlorhexidine 0.12% Liquid 15 milliLiter(s) Swish and Spit two times a day  chlorhexidine 2% Cloths 1 Application(s) Topical daily  dextrose 5%. 1000 milliLiter(s) (50 mL/Hr) IV Continuous <Continuous>  dextrose 50% Injectable 12.5 Gram(s) IV Push once  dextrose 50% Injectable 25 Gram(s) IV Push once  dextrose 50% Injectable 25 Gram(s) IV Push once  escitalopram 5 milliGRAM(s) Oral daily  folic acid 1 milliGRAM(s) Oral daily  hydrocortisone sodium succinate Injectable 50 milliGRAM(s) IV Push every 6 hours  insulin lispro (HumaLOG) corrective regimen sliding scale   SubCutaneous every 6 hours  multivitamin 1 Tablet(s) Oral daily  norepinephrine Infusion 0.01 MICROgram(s)/kG/Min (1.5 mL/Hr) IV Continuous <Continuous>  propofol Infusion 0.01 MICROgram(s)/kG/Min (0.005 mL/Hr) IV Continuous <Continuous>  PureFlow Dialysate RFP-400 (K 2 / Ca 3) 5000 milliLiter(s) (1500 mL/Hr) CRRT <Continuous>  senna 2 Tablet(s) Oral at bedtime  sodium chloride 0.9% lock flush 20 milliLiter(s) IV Push once  thiamine 100 milliGRAM(s) Oral daily  vasopressin Infusion 0.01 Unit(s)/Min (0.6 mL/Hr) IV Continuous <Continuous>    MEDICATIONS  (PRN):  acetaminophen   Tablet. 650 milliGRAM(s) Oral every 6 hours PRN Moderate Pain (4 - 6)  dextrose Gel 1 Dose(s) Oral once PRN Blood Glucose LESS THAN 70 milliGRAM(s)/deciliter  glucagon  Injectable 1 milliGRAM(s) IntraMuscular once PRN Glucose LESS THAN 70 milligrams/deciliter  sodium chloride 0.9% lock flush 10 milliLiter(s) IV Push every 1 hour PRN After each medication administration  sodium chloride 0.9% lock flush 10 milliLiter(s) IV Push every 12 hours PRN Lumen of catheter NOT used      ALLERGIES:  Allergies    Allergy Status Unknown    Intolerances        LABS:                        9.6    11.5  )-----------( 111      ( 24 Mar 2018 05:45 )             31.1     03-24    134<L>  |  98  |  26<H>  ----------------------------<  180<H>  4.1   |  22  |  1.82<H>    Ca    8.7      24 Mar 2018 05:46  Phos  2.8     03-24  Mg     2.1     03-24    TPro  5.6<L>  /  Alb  3.1<L>  /  TBili  2.5<H>  /  DBili  x   /  AST  26  /  ALT  24  /  AlkPhos  104  03-22    PT/INR - ( 24 Mar 2018 05:45 )   PT: 22.2 sec;   INR: 1.97          PTT - ( 24 Mar 2018 05:45 )  PTT:55.3 sec      RADIOLOGY & ADDITIONAL TESTS: Reviewed. INTERVAL HPI/OVERNIGHT EVENTS: 13 beats vTach     SUBJECTIVE: Patient seen and examined at bedside. Intubated. More arousable and following commands later in the morning     OBJECTIVE:    VITAL SIGNS:  ICU Vital Signs Last 24 Hrs  T(C): 34.9 (24 Mar 2018 06:00), Max: 36.8 (23 Mar 2018 22:03)  T(F): 94.8 (24 Mar 2018 06:00), Max: 98.3 (23 Mar 2018 22:03)  HR: 78 (24 Mar 2018 10:00) (70 - 84)  BP: 84/71 (23 Mar 2018 21:45) (84/71 - 84/71)  BP(mean): 76 (23 Mar 2018 21:45) (76 - 76)  ABP: 92/64 (24 Mar 2018 10:00) (78/56 - 98/70)  ABP(mean): 74 (24 Mar 2018 10:00) (62 - 80)  RR: 10 (24 Mar 2018 10:00) (6 - 14)  SpO2: 100% (24 Mar 2018 10:00) (97% - 100%)    Mode: CPAP with PS, FiO2: 40, PEEP: 5, PS: 10, MAP: 7.5, PIP: 15    03-23 @ 07:01 - 03-24 @ 07:00  --------------------------------------------------------  IN: 1342.6 mL / OUT: 1170 mL / NET: 172.6 mL    03-24 @ 07:01 - 03-24 @ 12:15  --------------------------------------------------------  IN: 63.4 mL / OUT: 73 mL / NET: -9.6 mL      CAPILLARY BLOOD GLUCOSE  98 (22 Mar 2018 18:01)      POCT Blood Glucose.: 216 mg/dL (24 Mar 2018 11:09)      PHYSICAL EXAM:    General: Intubated follows simple commands  HEENT: NC/AT; PERRL, clear conjunctiva  Neck: supple, R HD cath, L IJ  Respiratory: Diffuse rhonchi  Cardiovascular: +S1/S2; RRR, ii/vi systolic murmur   Abdomen: soft, NT/ND; +BS x4  : Decreased testicular edema   Extremities:  2+ LE edema. b/l venous stasis changes. cool to touch   Vascular: WWP, 2+ peripheral pulses b/l;  Skin: normal color and turgor; no rash  Neurological: A&Ox3, move all extremities. CN II-XII intact    MEDICATIONS:  MEDICATIONS  (STANDING):  aspirin  chewable 81 milliGRAM(s) Oral daily  atorvastatin 80 milliGRAM(s) Oral at bedtime  chlorhexidine 0.12% Liquid 15 milliLiter(s) Swish and Spit two times a day  chlorhexidine 2% Cloths 1 Application(s) Topical daily  dextrose 5%. 1000 milliLiter(s) (50 mL/Hr) IV Continuous <Continuous>  dextrose 50% Injectable 12.5 Gram(s) IV Push once  dextrose 50% Injectable 25 Gram(s) IV Push once  dextrose 50% Injectable 25 Gram(s) IV Push once  escitalopram 5 milliGRAM(s) Oral daily  folic acid 1 milliGRAM(s) Oral daily  hydrocortisone sodium succinate Injectable 50 milliGRAM(s) IV Push every 6 hours  insulin lispro (HumaLOG) corrective regimen sliding scale   SubCutaneous every 6 hours  multivitamin 1 Tablet(s) Oral daily  norepinephrine Infusion 0.01 MICROgram(s)/kG/Min (1.5 mL/Hr) IV Continuous <Continuous>  propofol Infusion 0.01 MICROgram(s)/kG/Min (0.005 mL/Hr) IV Continuous <Continuous>  PureFlow Dialysate RFP-400 (K 2 / Ca 3) 5000 milliLiter(s) (1500 mL/Hr) CRRT <Continuous>  senna 2 Tablet(s) Oral at bedtime  sodium chloride 0.9% lock flush 20 milliLiter(s) IV Push once  thiamine 100 milliGRAM(s) Oral daily  vasopressin Infusion 0.01 Unit(s)/Min (0.6 mL/Hr) IV Continuous <Continuous>    MEDICATIONS  (PRN):  acetaminophen   Tablet. 650 milliGRAM(s) Oral every 6 hours PRN Moderate Pain (4 - 6)  dextrose Gel 1 Dose(s) Oral once PRN Blood Glucose LESS THAN 70 milliGRAM(s)/deciliter  glucagon  Injectable 1 milliGRAM(s) IntraMuscular once PRN Glucose LESS THAN 70 milligrams/deciliter  sodium chloride 0.9% lock flush 10 milliLiter(s) IV Push every 1 hour PRN After each medication administration  sodium chloride 0.9% lock flush 10 milliLiter(s) IV Push every 12 hours PRN Lumen of catheter NOT used      ALLERGIES:  Allergies    Allergy Status Unknown    Intolerances        LABS:                        9.6    11.5  )-----------( 111      ( 24 Mar 2018 05:45 )             31.1     03-24    134<L>  |  98  |  26<H>  ----------------------------<  180<H>  4.1   |  22  |  1.82<H>    Ca    8.7      24 Mar 2018 05:46  Phos  2.8     03-24  Mg     2.1     03-24    TPro  5.6<L>  /  Alb  3.1<L>  /  TBili  2.5<H>  /  DBili  x   /  AST  26  /  ALT  24  /  AlkPhos  104  03-22    PT/INR - ( 24 Mar 2018 05:45 )   PT: 22.2 sec;   INR: 1.97          PTT - ( 24 Mar 2018 05:45 )  PTT:55.3 sec      RADIOLOGY & ADDITIONAL TESTS: Reviewed.

## 2018-03-24 NOTE — PROGRESS NOTE ADULT - SUBJECTIVE AND OBJECTIVE BOX
Neurology Stroke Follow Up   11AM  Interval History:  Patient seen and examined.  Intubated, on pressors      Medications:  MEDICATIONS  (STANDING):  aspirin  chewable 81 milliGRAM(s) Oral daily  atorvastatin 80 milliGRAM(s) Oral at bedtime  chlorhexidine 0.12% Liquid 15 milliLiter(s) Swish and Spit two times a day  chlorhexidine 2% Cloths 1 Application(s) Topical daily  dextrose 5%. 1000 milliLiter(s) (50 mL/Hr) IV Continuous <Continuous>  dextrose 50% Injectable 12.5 Gram(s) IV Push once  dextrose 50% Injectable 25 Gram(s) IV Push once  dextrose 50% Injectable 25 Gram(s) IV Push once  escitalopram 5 milliGRAM(s) Oral daily  folic acid 1 milliGRAM(s) Oral daily  hydrocortisone sodium succinate Injectable 50 milliGRAM(s) IV Push every 6 hours  insulin lispro (HumaLOG) corrective regimen sliding scale   SubCutaneous every 6 hours  multivitamin 1 Tablet(s) Oral daily  norepinephrine Infusion 0.01 MICROgram(s)/kG/Min (1.5 mL/Hr) IV Continuous <Continuous>  propofol Infusion 0.01 MICROgram(s)/kG/Min (0.005 mL/Hr) IV Continuous <Continuous>  PureFlow Dialysate RFP-400 (K 2 / Ca 3) 5000 milliLiter(s) (1500 mL/Hr) CRRT <Continuous>  senna 2 Tablet(s) Oral at bedtime  sodium chloride 0.9% lock flush 20 milliLiter(s) IV Push once  thiamine 100 milliGRAM(s) Oral daily  vasopressin Infusion 0.01 Unit(s)/Min (0.6 mL/Hr) IV Continuous <Continuous>    MEDICATIONS  (PRN):  acetaminophen   Tablet. 650 milliGRAM(s) Oral every 6 hours PRN Moderate Pain (4 - 6)  dextrose Gel 1 Dose(s) Oral once PRN Blood Glucose LESS THAN 70 milliGRAM(s)/deciliter  glucagon  Injectable 1 milliGRAM(s) IntraMuscular once PRN Glucose LESS THAN 70 milligrams/deciliter  sodium chloride 0.9% lock flush 10 milliLiter(s) IV Push every 1 hour PRN After each medication administration  sodium chloride 0.9% lock flush 10 milliLiter(s) IV Push every 12 hours PRN Lumen of catheter NOT used    Allergies    Allergy Status Unknown    Intolerances        Exam:  Vital -     Gen: Lying in bed, calm, cooperative, in NAD  Neuro:  Mental status: Awake, alert and oriented x3. Follows commands. Recent and remote memory intact.  Naming, repetition and comprehension intact.  Attention/concentration intact.  No dysarthria, no aphasia.      Cranial nerves: Pupils equally round and reactive to light, visual fields full, no nystagmus, extraocular muscles intact, V1 through V3 intact bilaterally and symmetric, no facial droop, hearing intact to finger rub, palate elevation symmetric, tongue was midline, sternocleidomastoid/shoulder shrug strength bilaterally 5/5.    Motor:  No pronator drift. No fix. Normal bulk and tone, strength 5/5 in bilateral upper and lower extremities.   strength 5/5.    Sensation: Intact to light touch, proprioception, vibration, temperature, pinprick.  No neglect.   Coordination: No dysmetria on finger-to-nose and heel-to-shin.  No clumsiness. Rapid alternating movements intact and symmetric.   Reflexes: 2+ in upper and lower extremities, absent Babinski bilaterally  Gait: Narrow and steady. No ataxia.  Romberg negative    Labs:                        8.3    10.8  )-----------( 91       ( 24 Mar 2018 19:41 )             26.8     03-24    135  |  99  |  28<H>  ----------------------------<  276<H>  4.2   |  23  |  1.85<H>    Ca    8.3<L>      24 Mar 2018 19:41  Phos  2.8     03-24  Mg     2.1     03-24      PT/INR - ( 24 Mar 2018 05:45 )   PT: 22.2 sec;   INR: 1.97     PTT - ( 24 Mar 2018 05:45 )  PTT:55.3 sec    Hemoglobin A1C, Whole Blood: 8.6 % (03-10 @ 23:31)      Radiology:      Assessment:     Plan: Neurology Stroke Follow Up   11AM  Interval History:  Patient seen and examined.  Intubated, on pressors  He supposedly followed some commands for MICU team.    Medications:  MEDICATIONS  (STANDING):  aspirin  chewable 81 milliGRAM(s) Oral daily  atorvastatin 80 milliGRAM(s) Oral at bedtime  chlorhexidine 0.12% Liquid 15 milliLiter(s) Swish and Spit two times a day  chlorhexidine 2% Cloths 1 Application(s) Topical daily  escitalopram 5 milliGRAM(s) Oral daily  folic acid 1 milliGRAM(s) Oral daily  hydrocortisone sodium succinate Injectable 50 milliGRAM(s) IV Push every 6 hours  insulin lispro (HumaLOG) corrective regimen sliding scale   SubCutaneous every 6 hours  multivitamin 1 Tablet(s) Oral daily  norepinephrine Infusion 0.01 MICROgram(s)/kG/Min (1.5 mL/Hr) IV Continuous <Continuous>  propofol Infusion 0.01 MICROgram(s)/kG/Min (0.005 mL/Hr) IV Continuous <Continuous>  PureFlow Dialysate RFP-400 (K 2 / Ca 3) 5000 milliLiter(s) (1500 mL/Hr) CRRT <Continuous>  senna 2 Tablet(s) Oral at bedtime  sodium chloride 0.9% lock flush 20 milliLiter(s) IV Push once  thiamine 100 milliGRAM(s) Oral daily  vasopressin Infusion 0.01 Unit(s)/Min (0.6 mL/Hr) IV Continuous <Continuous>    MEDICATIONS  (PRN):  acetaminophen   Tablet. 650 milliGRAM(s) Oral every 6 hours PRN Moderate Pain (4 - 6)  dextrose Gel 1 Dose(s) Oral once PRN Blood Glucose LESS THAN 70 milliGRAM(s)/deciliter  glucagon  Injectable 1 milliGRAM(s) IntraMuscular once PRN Glucose LESS THAN 70 milligrams/deciliter  sodium chloride 0.9% lock flush 10 milliLiter(s) IV Push every 1 hour PRN After each medication administration  sodium chloride 0.9% lock flush 10 milliLiter(s) IV Push every 12 hours PRN Lumen of catheter NOT used    Allergies    Allergy Status Unknown    Exam:  Vital - 90/62, HR 86, RR 14, 98.8F    Gen: Lying in bed, intubated, EEG in place  Neuro:  Mental status: slight opening of his eyes to voice, no other response, not following commands  Cranial nerves: eyes midline, pupils equally round and reactive to light, + corneals, hard to assess facial due to ETT    Motor:  no spontaneous movement     Sensation: no response to pain   Reflexes: absent Babinski bilaterally  Gait: deferred    Labs:                        8.3    10.8  )-----------( 91       ( 24 Mar 2018 19:41 )             26.8     03-24    135  |  99  |  28<H>  ----------------------------<  276<H>  4.2   |  23  |  1.85<H>    Ca    8.3<L>      24 Mar 2018 19:41  Phos  2.8     03-24  Mg     2.1     03-24      PT/INR - ( 24 Mar 2018 05:45 )   PT: 22.2 sec;   INR: 1.97     PTT - ( 24 Mar 2018 05:45 )  PTT:55.3 sec    Hemoglobin A1C, Whole Blood: 8.6 % (03-10 @ 23:31)    Radiology:  no new cerebral imaging    Assessment: 63 year-old male with PMH CHF, s/p AICD vs PPM, ?Afib, HTN, DM2 on insulin, CKD, and gout presented with septic shock 2/2 cellulitis c/b cardiogenic shock, became unresponsive after A-line placement yesterday. Unclear that ischemia based on workup to date and therapeutic INR.      Plan:  1) Follow up vEEG read  2) Repeat CT Head today with further recs.

## 2018-03-25 LAB
ANION GAP SERPL CALC-SCNC: 12 MMOL/L — SIGNIFICANT CHANGE UP (ref 5–17)
ANION GAP SERPL CALC-SCNC: 13 MMOL/L — SIGNIFICANT CHANGE UP (ref 5–17)
APTT BLD: 45.1 SEC — HIGH (ref 27.5–37.4)
BUN SERPL-MCNC: 28 MG/DL — HIGH (ref 7–23)
BUN SERPL-MCNC: 29 MG/DL — HIGH (ref 7–23)
BUN SERPL-MCNC: 30 MG/DL — HIGH (ref 7–23)
BUN SERPL-MCNC: 30 MG/DL — HIGH (ref 7–23)
CALCIUM SERPL-MCNC: 8.6 MG/DL — SIGNIFICANT CHANGE UP (ref 8.4–10.5)
CALCIUM SERPL-MCNC: 8.6 MG/DL — SIGNIFICANT CHANGE UP (ref 8.4–10.5)
CALCIUM SERPL-MCNC: 8.7 MG/DL — SIGNIFICANT CHANGE UP (ref 8.4–10.5)
CALCIUM SERPL-MCNC: 9.1 MG/DL — SIGNIFICANT CHANGE UP (ref 8.4–10.5)
CHLORIDE SERPL-SCNC: 97 MMOL/L — SIGNIFICANT CHANGE UP (ref 96–108)
CHLORIDE SERPL-SCNC: 98 MMOL/L — SIGNIFICANT CHANGE UP (ref 96–108)
CHLORIDE SERPL-SCNC: 98 MMOL/L — SIGNIFICANT CHANGE UP (ref 96–108)
CHLORIDE SERPL-SCNC: 99 MMOL/L — SIGNIFICANT CHANGE UP (ref 96–108)
CO2 SERPL-SCNC: 22 MMOL/L — SIGNIFICANT CHANGE UP (ref 22–31)
CO2 SERPL-SCNC: 23 MMOL/L — SIGNIFICANT CHANGE UP (ref 22–31)
CO2 SERPL-SCNC: 23 MMOL/L — SIGNIFICANT CHANGE UP (ref 22–31)
CO2 SERPL-SCNC: 24 MMOL/L — SIGNIFICANT CHANGE UP (ref 22–31)
CREAT SERPL-MCNC: 1.71 MG/DL — HIGH (ref 0.5–1.3)
CREAT SERPL-MCNC: 1.77 MG/DL — HIGH (ref 0.5–1.3)
CREAT SERPL-MCNC: 1.8 MG/DL — HIGH (ref 0.5–1.3)
CREAT SERPL-MCNC: 1.8 MG/DL — HIGH (ref 0.5–1.3)
GLUCOSE BLDC GLUCOMTR-MCNC: 207 MG/DL — HIGH (ref 70–99)
GLUCOSE BLDC GLUCOMTR-MCNC: 230 MG/DL — HIGH (ref 70–99)
GLUCOSE BLDC GLUCOMTR-MCNC: 232 MG/DL — HIGH (ref 70–99)
GLUCOSE BLDC GLUCOMTR-MCNC: 278 MG/DL — HIGH (ref 70–99)
GLUCOSE BLDC GLUCOMTR-MCNC: 301 MG/DL — HIGH (ref 70–99)
GLUCOSE SERPL-MCNC: 232 MG/DL — HIGH (ref 70–99)
GLUCOSE SERPL-MCNC: 274 MG/DL — HIGH (ref 70–99)
GLUCOSE SERPL-MCNC: 294 MG/DL — HIGH (ref 70–99)
GLUCOSE SERPL-MCNC: 297 MG/DL — HIGH (ref 70–99)
HCT VFR BLD CALC: 26.5 % — LOW (ref 39–50)
HCT VFR BLD CALC: 27.2 % — LOW (ref 39–50)
HCT VFR BLD CALC: 27.9 % — LOW (ref 39–50)
HCT VFR BLD CALC: 29 % — LOW (ref 39–50)
HGB BLD-MCNC: 8.3 G/DL — LOW (ref 13–17)
HGB BLD-MCNC: 8.5 G/DL — LOW (ref 13–17)
HGB BLD-MCNC: 9 G/DL — LOW (ref 13–17)
HGB BLD-MCNC: 9 G/DL — LOW (ref 13–17)
INR BLD: 1.95 — HIGH (ref 0.88–1.16)
MAGNESIUM SERPL-MCNC: 2 MG/DL — SIGNIFICANT CHANGE UP (ref 1.6–2.6)
MCHC RBC-ENTMCNC: 30.4 PG — SIGNIFICANT CHANGE UP (ref 27–34)
MCHC RBC-ENTMCNC: 30.4 PG — SIGNIFICANT CHANGE UP (ref 27–34)
MCHC RBC-ENTMCNC: 31 G/DL — LOW (ref 32–36)
MCHC RBC-ENTMCNC: 31 PG — SIGNIFICANT CHANGE UP (ref 27–34)
MCHC RBC-ENTMCNC: 31.3 G/DL — LOW (ref 32–36)
MCHC RBC-ENTMCNC: 31.3 G/DL — LOW (ref 32–36)
MCHC RBC-ENTMCNC: 31.6 PG — SIGNIFICANT CHANGE UP (ref 27–34)
MCHC RBC-ENTMCNC: 32.3 G/DL — SIGNIFICANT CHANGE UP (ref 32–36)
MCV RBC AUTO: 97.1 FL — SIGNIFICANT CHANGE UP (ref 80–100)
MCV RBC AUTO: 97.9 FL — SIGNIFICANT CHANGE UP (ref 80–100)
MCV RBC AUTO: 98 FL — SIGNIFICANT CHANGE UP (ref 80–100)
MCV RBC AUTO: 99.3 FL — SIGNIFICANT CHANGE UP (ref 80–100)
PHOSPHATE SERPL-MCNC: 2.2 MG/DL — LOW (ref 2.5–4.5)
PLATELET # BLD AUTO: 134 K/UL — LOW (ref 150–400)
PLATELET # BLD AUTO: 138 K/UL — LOW (ref 150–400)
PLATELET # BLD AUTO: 93 K/UL — LOW (ref 150–400)
PLATELET # BLD AUTO: 99 K/UL — LOW (ref 150–400)
POTASSIUM SERPL-MCNC: 3.4 MMOL/L — LOW (ref 3.5–5.3)
POTASSIUM SERPL-MCNC: 3.5 MMOL/L — SIGNIFICANT CHANGE UP (ref 3.5–5.3)
POTASSIUM SERPL-MCNC: 4 MMOL/L — SIGNIFICANT CHANGE UP (ref 3.5–5.3)
POTASSIUM SERPL-MCNC: 4 MMOL/L — SIGNIFICANT CHANGE UP (ref 3.5–5.3)
POTASSIUM SERPL-SCNC: 3.4 MMOL/L — LOW (ref 3.5–5.3)
POTASSIUM SERPL-SCNC: 3.5 MMOL/L — SIGNIFICANT CHANGE UP (ref 3.5–5.3)
POTASSIUM SERPL-SCNC: 4 MMOL/L — SIGNIFICANT CHANGE UP (ref 3.5–5.3)
POTASSIUM SERPL-SCNC: 4 MMOL/L — SIGNIFICANT CHANGE UP (ref 3.5–5.3)
PROTHROM AB SERPL-ACNC: 21.9 SEC — HIGH (ref 9.8–12.7)
RBC # BLD: 2.73 M/UL — LOW (ref 4.2–5.8)
RBC # BLD: 2.74 M/UL — LOW (ref 4.2–5.8)
RBC # BLD: 2.85 M/UL — LOW (ref 4.2–5.8)
RBC # BLD: 2.96 M/UL — LOW (ref 4.2–5.8)
RBC # FLD: 18.8 % — HIGH (ref 10.3–16.9)
RBC # FLD: 19.9 % — HIGH (ref 10.3–16.9)
RBC # FLD: 20.1 % — HIGH (ref 10.3–16.9)
RBC # FLD: 20.1 % — HIGH (ref 10.3–16.9)
SODIUM SERPL-SCNC: 132 MMOL/L — LOW (ref 135–145)
SODIUM SERPL-SCNC: 133 MMOL/L — LOW (ref 135–145)
SODIUM SERPL-SCNC: 134 MMOL/L — LOW (ref 135–145)
SODIUM SERPL-SCNC: 136 MMOL/L — SIGNIFICANT CHANGE UP (ref 135–145)
WBC # BLD: 10.5 K/UL — SIGNIFICANT CHANGE UP (ref 3.8–10.5)
WBC # BLD: 11.9 K/UL — HIGH (ref 3.8–10.5)
WBC # BLD: 13.2 K/UL — HIGH (ref 3.8–10.5)
WBC # BLD: 9.6 K/UL — SIGNIFICANT CHANGE UP (ref 3.8–10.5)
WBC # FLD AUTO: 10.5 K/UL — SIGNIFICANT CHANGE UP (ref 3.8–10.5)
WBC # FLD AUTO: 11.9 K/UL — HIGH (ref 3.8–10.5)
WBC # FLD AUTO: 13.2 K/UL — HIGH (ref 3.8–10.5)
WBC # FLD AUTO: 9.6 K/UL — SIGNIFICANT CHANGE UP (ref 3.8–10.5)

## 2018-03-25 PROCEDURE — 99233 SBSQ HOSP IP/OBS HIGH 50: CPT

## 2018-03-25 PROCEDURE — 93010 ELECTROCARDIOGRAM REPORT: CPT

## 2018-03-25 PROCEDURE — 71045 X-RAY EXAM CHEST 1 VIEW: CPT | Mod: 26

## 2018-03-25 RX ORDER — POTASSIUM CHLORIDE 20 MEQ
20 PACKET (EA) ORAL
Qty: 0 | Refills: 0 | Status: COMPLETED | OUTPATIENT
Start: 2018-03-25 | End: 2018-03-25

## 2018-03-25 RX ORDER — PIPERACILLIN AND TAZOBACTAM 4; .5 G/20ML; G/20ML
2.25 INJECTION, POWDER, LYOPHILIZED, FOR SOLUTION INTRAVENOUS ONCE
Qty: 0 | Refills: 0 | Status: COMPLETED | OUTPATIENT
Start: 2018-03-25 | End: 2018-03-25

## 2018-03-25 RX ORDER — INSULIN GLARGINE 100 [IU]/ML
10 INJECTION, SOLUTION SUBCUTANEOUS AT BEDTIME
Qty: 0 | Refills: 0 | Status: DISCONTINUED | OUTPATIENT
Start: 2018-03-25 | End: 2018-03-26

## 2018-03-25 RX ORDER — PIPERACILLIN AND TAZOBACTAM 4; .5 G/20ML; G/20ML
2.25 INJECTION, POWDER, LYOPHILIZED, FOR SOLUTION INTRAVENOUS EVERY 6 HOURS
Qty: 0 | Refills: 0 | Status: DISCONTINUED | OUTPATIENT
Start: 2018-03-26 | End: 2018-03-26

## 2018-03-25 RX ORDER — PANTOPRAZOLE SODIUM 20 MG/1
40 TABLET, DELAYED RELEASE ORAL DAILY
Qty: 0 | Refills: 0 | Status: DISCONTINUED | OUTPATIENT
Start: 2018-03-25 | End: 2018-03-27

## 2018-03-25 RX ORDER — WARFARIN SODIUM 2.5 MG/1
3 TABLET ORAL ONCE
Qty: 0 | Refills: 0 | Status: COMPLETED | OUTPATIENT
Start: 2018-03-25 | End: 2018-03-25

## 2018-03-25 RX ORDER — PIPERACILLIN AND TAZOBACTAM 4; .5 G/20ML; G/20ML
INJECTION, POWDER, LYOPHILIZED, FOR SOLUTION INTRAVENOUS
Qty: 0 | Refills: 0 | Status: DISCONTINUED | OUTPATIENT
Start: 2018-03-25 | End: 2018-03-26

## 2018-03-25 RX ADMIN — Medication 4: at 23:37

## 2018-03-25 RX ADMIN — Medication 1 TABLET(S): at 13:14

## 2018-03-25 RX ADMIN — INSULIN GLARGINE 10 UNIT(S): 100 INJECTION, SOLUTION SUBCUTANEOUS at 23:37

## 2018-03-25 RX ADMIN — CHLORHEXIDINE GLUCONATE 15 MILLILITER(S): 213 SOLUTION TOPICAL at 19:01

## 2018-03-25 RX ADMIN — WARFARIN SODIUM 3 MILLIGRAM(S): 2.5 TABLET ORAL at 22:19

## 2018-03-25 RX ADMIN — Medication 50 MILLIEQUIVALENT(S): at 14:16

## 2018-03-25 RX ADMIN — SENNA PLUS 2 TABLET(S): 8.6 TABLET ORAL at 22:19

## 2018-03-25 RX ADMIN — ESCITALOPRAM OXALATE 5 MILLIGRAM(S): 10 TABLET, FILM COATED ORAL at 13:14

## 2018-03-25 RX ADMIN — Medication 50 MILLIGRAM(S): at 11:02

## 2018-03-25 RX ADMIN — Medication 50 MILLIGRAM(S): at 22:19

## 2018-03-25 RX ADMIN — ATORVASTATIN CALCIUM 80 MILLIGRAM(S): 80 TABLET, FILM COATED ORAL at 22:19

## 2018-03-25 RX ADMIN — Medication 50 MILLIGRAM(S): at 03:28

## 2018-03-25 RX ADMIN — CHLORHEXIDINE GLUCONATE 15 MILLILITER(S): 213 SOLUTION TOPICAL at 07:42

## 2018-03-25 RX ADMIN — Medication 8: at 17:27

## 2018-03-25 RX ADMIN — Medication 1 MILLIGRAM(S): at 13:14

## 2018-03-25 RX ADMIN — Medication 50 MILLIEQUIVALENT(S): at 07:43

## 2018-03-25 RX ADMIN — Medication 4: at 11:51

## 2018-03-25 RX ADMIN — PROPOFOL 0.01 MICROGRAM(S)/KG/MIN: 10 INJECTION, EMULSION INTRAVENOUS at 10:56

## 2018-03-25 RX ADMIN — PANTOPRAZOLE SODIUM 40 MILLIGRAM(S): 20 TABLET, DELAYED RELEASE ORAL at 22:19

## 2018-03-25 RX ADMIN — Medication 1.5 MICROGRAM(S)/KG/MIN: at 06:01

## 2018-03-25 RX ADMIN — PIPERACILLIN AND TAZOBACTAM 200 GRAM(S): 4; .5 INJECTION, POWDER, LYOPHILIZED, FOR SOLUTION INTRAVENOUS at 23:38

## 2018-03-25 RX ADMIN — Medication 100 MILLIGRAM(S): at 13:14

## 2018-03-25 RX ADMIN — VASOPRESSIN 0.6 UNIT(S)/MIN: 20 INJECTION INTRAVENOUS at 06:02

## 2018-03-25 RX ADMIN — Medication 81 MILLIGRAM(S): at 13:14

## 2018-03-25 RX ADMIN — Medication 6: at 01:52

## 2018-03-25 RX ADMIN — Medication 4: at 07:43

## 2018-03-25 RX ADMIN — CHLORHEXIDINE GLUCONATE 1 APPLICATION(S): 213 SOLUTION TOPICAL at 11:37

## 2018-03-25 RX ADMIN — PIPERACILLIN AND TAZOBACTAM 200 GRAM(S): 4; .5 INJECTION, POWDER, LYOPHILIZED, FOR SOLUTION INTRAVENOUS at 17:51

## 2018-03-25 RX ADMIN — Medication 50 MILLIEQUIVALENT(S): at 11:40

## 2018-03-25 RX ADMIN — Medication 50 MILLIGRAM(S): at 17:17

## 2018-03-25 NOTE — PROGRESS NOTE ADULT - ASSESSMENT
62 yo M history of HFrEF 10-15% 2/2 ischemic cardiomyopathy, MI , s/p AICD vs PPM, ?Afib, Hypertension, Diabetes Mellitus Type 2 on insulin, CKD?and gout, who presents with a chief complaint of generalized weakness. Now shakira - atn o n CVVHD for managing the fluid status - we will continue today with CVVHD - 75 ml/h, on pressor support, 24 hours neutral,please consider decreasing the input of fluids

## 2018-03-25 NOTE — CHART NOTE - NSCHARTNOTEFT_GEN_A_CORE
Evaluated pt at bedside in afternoon of 3/24/18 to determine fluid status and need for inotropic support. Pt has had significant removal of fluids with CVVHD. ~1-1.5 L q 12 hrs. At that time pt sedated on propofol but according nursing staff pt able to follow commands to squeeze fingers in AM when off sedation. Neuro following for possible stroke. Workup for stroke currently ongoing. Pt on EEG as well.     On physical exam pt with stable MAPs while on Vasopressin and Levophed. He has 1-2+ pitting edema on dependent regions; crackles at bases of both lungs and a JVP of 9. CVP correlates.     Central venous saturation was 61% with a Hb of 8.9; Cr stable at ~ 1.8.    Per primary team, they have been unable to titrate pressors down due to low BP. Their question is whether we recommend starting inotropic support for the low systemic bp.   Given that pt's Central venous saturation is 61%, low likelihood that he has a low cardiac output. Therefore inotropic support may not be necessary at this time. Would recommend continuing fluid removal with CVVHD. Check another Central venous saturation at a later time. Continue with vasopressor support as needed for low BP.    Case discussed with Dr Warren from Heart Failure team.

## 2018-03-25 NOTE — PROGRESS NOTE ADULT - ASSESSMENT
63M PMH HFrEF 10-15% (ischemic), MI, s/p AICD vs PPM, possible Afib, HTN, DM2 on insulin, possible CKD, and gout, who presents with a chief complaint of generalized weakness s/p septic shock likely 2/2 LE cellulitis. Now with AMS     NEURO  #AMS  - Pt non responsive 2 days ago. . VItal signs normal. Pt intubated for airway protection. Stroke code called. CT, CTA negative. VEEG in place- moderate slowing but no seizure activity.   - Pt more awake today, follows commands.   - Will obtain CT head repeat. Will check MRI compatibility with [employer] company.  -  TTE with Affinity shows no clot.   - Will extubate if patient alert during the day and follows commands.     #   ID- septic shock 2/2 Cellulitis   -Shock initially resolved however pt persistently hypoglycemic, hypothermic with AMS.   - Restarted on Levophed and Vasopressin given low BP. Will try to wean off Levophed.   - c/w steroids 50 q6h.   -. s/p Zosyn (3/11-2/21). Was previously also on Vanco (3/11-3/17). Will discuss restarting abx is status does not change   - Will obtain RUQ dopper to rule out portal vein thrombosis   - TTE with no vegetations. Pt not stable for RUKHSANA. Will obtain TTE with affinity to r/o Lv thrombus   - Gallium scan read shows LLE cellulitis. ,     - repeat LE CT does not show abscess or gas.   - R lung Effusion likely from overload. s/p thoracentesis with improvement in resp status   -  HIV negative  - Vascular surgery on board. Recommending Amputation as an option if pt continues to be unstable.     CARDIOVASCULAR   # HYPOTENSION-   - c/w Levophed and Vasopressin drip. Will maintain SBP>90. Will discuss starting Inotropes with cardiology   -  Will continue CVVHD to help reduce CVP   - pending transfer to CCU  - Gallium scan with only LLE cellulitis   - Lactate downtrended to WNL   - CHF with severely reduced EF 15%, caution with fluids. Per renal recs, can give IV albumin for fluids.    # CHF exacerbation  - CHF with EF 10-15% per pt 2/2 ischemic cardiomyopathy s/p AICD as indicated by CXR   - Elevated BNP on admission with R sided effusion and edema  - Cardiac shock causing hypotension   - Started on vasopressin per CHF team recs. Added Levophed given persistently low pressures.   - Persistent pulm congestion noted on chest Xray. c/w CVVHD  - c./w Hydralazine 5mg  q6h and Isordil 5mg tid as tolerated   - Give fluids judiciously given low EF in setting of likely sepsis  - Unclear medical regimen opt. Per [facility] pharmacy ( and [place]) pt has not picked up Torsemide 20 or Metoprolol 100 since November.   - Hold ACE/Metoprolol in setting of sepsis  - CVO saturation improved after transfusion    #AFIB  - PT with hx of Afib and LV thrombus on coumadin.    -d/c  Hep drip given concern of HIT.d/c Argatroban.  - Dose coumadin daily. OG tube placed.    - Pt s/p FFP (3/13,3/14) and Vit K (3/13) for thoracocentesis and HD catheter placement.   - Will obtain TTE with affinity given hx of LV thrombus and now with AMS        #CAD- Hx of MI s/p AICD  - Pt with pLAD in 7/2017, was started on Aspirin and Brelinta per records.  - Will start on asp 81, Atorvastatin 80   - c/w Coumadin     # LE Edema   - LE edema with chronic venous stasis.   - Duplex does not show DVT    PULMONARY   Intubated  - Intubated on 3/22 for airway protection   - Will attempt CPAP trial and extubation     #Acute resp failure- Resolved   - Intubated for airway protection   - pt with R loculated plural effusion noted to have increased work of breathing. Likely 2/2 fluid overload, Fluid studies consistent with exudative effusion.   - s/p thoracentesis on 3/13 with R chest tube placement. Chest tube removed 3/15.   - s/p  pRBC transfusion on 3/12. 3/16  - Monitor resp status  - Appreciate CHF team recs regarding fluid status. Continue dialysis for fluid removal      #RENAL   #GILMER- 2/2 ischemic ATN  -Unknown baseline Cr presenting with Cr 3.93 with metabolic derangements.   - Likely 2/2 Sepsis, low intravascular volume and CHF  - Trend BUN and Cr.  - Renal team on board, pt with R HD catheter placed by IR on 3/21. c/w CVVHD currently removing 75cc/hour   - CT abdomen and pelvis without acute obstruction.   - retroperitoneal ultrasound showing medical renal disease.     #Hyperkalemia   -Resolved   - PT with elevated K to 5.7 on admission,  no EKG changes   - Continue telemetry monitoring  - Trend K   - Can given Kayexalate if persists   - c/w dialysis     #Metabolic acidosis   - 2/2 Lactate, starvation ketoacidosis and uremia   - Increased overnight. No resolved       #GI   - pt wit Elevated Bilirubin and Alk phos. Abd exam benign  - CT abdomen/pelvis consistent with cholelithiasis and ascites.     #HEME    #Thrombocytopenia  - ELVIRA negative but PF4 was positive. Unlikely HIT.    - Could have be related to Vanc/zosyn. Trend platelet count now that both are discontinued.   - Sepsis also in differential.  Improving      # elevated INR. Unclear etiology. Pt on coumadin?   - Continue with daily coumadin dosing   - Given Vit K and FFP3/12. FFP 3/13   - Monitor coags    #Anemia  - Stable at 8-9. Anemia of chronic disease.  -s/p 1pRBC on 3/12, 3/16   - WIll keep Hb around 9-10     #ENDOCRINE   - S/p insulin drip  - Decreasing Lantus to 5U as we taper down steroids. Monitor blood glucose and ISS coverage. pt with episodes of hypoglycemia.   - Monitor blood glucose   - A1C 8.6%      F: No IVF   E: Replete K<4 Mg<2, caution in setting of acute renal failure  N: OG tube placed, start feeds     Lines:  ETT (3/22), OG tube (3/21)R HD catheter (3/21). L IJ 3/21, PICC line 3/20    Full code   Dispo: MICU  Ppx: Coumadin 63M PMH HFrEF 10-15% (ischemic), MI, s/p AICD vs PPM, possible Afib, HTN, DM2 on insulin, possible CKD, and gout, who presents with a chief complaint of generalized weakness s/p septic shock likely 2/2 LE cellulitis. Now with AMS     NEURO  #AMS  - Pt non responsive 3 days ago. VItal signs normal. Pt intubated for airway protection. Stroke code called. CT, CTA negative. VEEG in place- moderate slowing but no seizure activity.   - Will obtain CT head repeat, will try to obtain portable CT from 8 E tomorrow. Issue with MRI compatibility with [employer].  -  TTE with [employer] shows no clot.       #   ID- septic shock 2/2 Cellulitis   -Shock initially resolved however pt persistently hypoglycemic, hypothermic with AMS.   - Restarted on Levophed and Vasopressin given low BP. Will try to wean off Levophed.   - c/w steroids 50 q6h.   -.Re-started zosyn today for suspected UTI, s/p Zosyn (3/11-2/21). Was previously also on Vanco (3/11-3/17).   - Will obtain RUQ dopper to rule out portal vein thrombosis   - TTE with no vegetations. Pt not stable for RUKHSANA. Will obtain TTE with affinity to r/o Lv thrombus   - Gallium scan read shows LLE cellulitis. ,     - repeat LE CT does not show abscess or gas.   - R lung Effusion likely from overload. s/p thoracentesis with improvement in resp status   -  HIV negative  - Vascular surgery on board. Recommending Amputation as an option if pt continues to be unstable.     #UTI- Urine cloudy appearing with increase WBC.  - f/u UA/UC  - re-started zosyn 3/25    CARDIOVASCULAR   # HYPOTENSION-   - c/w Levophed and Vasopressin drip. Will maintain SBP>90. Will discuss starting Inotropes with cardiology   -  Will continue CVVHD to help reduce CVP   - pending transfer to CCU  - Gallium scan with only LLE cellulitis   - Lactate downtrended to WNL   - CHF with severely reduced EF 15%, caution with fluids. Per renal recs, can give IV albumin for fluids.    # CHF exacerbation  - CHF with EF 10-15% per pt 2/2 ischemic cardiomyopathy s/p AICD as indicated by CXR   - Elevated BNP on admission with R sided effusion and edema  - Cardiac shock causing hypotension   - Started on vasopressin per CHF team recs. Added Levophed given persistently low pressures.   - Persistent pulm congestion noted on chest Xray. c/w CVVHD  - Give fluids judiciously given low EF in setting of likely sepsis  - Unclear medical regimen opt. Per [facility] pharmacy ( and [place]) pt has not picked up Torsemide 20 or Metoprolol 100 since November.   - Hold ACE/Metoprolol in setting of sepsis  - CVO saturation improved after transfusion    #AFIB  - PT with hx of Afib and LV thrombus on coumadin.    -d/c  Hep drip given concern of HIT.d/c Argatroban.  - Dose coumadin daily. OG tube placed.    - Pt s/p FFP (3/13,3/14) and Vit K (3/13) for thoracocentesis and HD catheter placement.   - Will obtain TTE with affinity given hx of LV thrombus and now with AMS        #CAD- Hx of MI s/p AICD  - Pt with pLAD in 7/2017, was started on Aspirin and Brelinta per records.  - Will start on asp 81, Atorvastatin 80   - c/w Coumadin     # LE Edema   - LE edema with chronic venous stasis.   - Duplex does not show DVT    PULMONARY   Intubated  - Intubated on 3/22 for airway protection   - Will attempt CPAP trial and extubation     #Acute resp failure- Resolved   - Intubated for airway protection   - pt with R loculated plural effusion noted to have increased work of breathing. Likely 2/2 fluid overload, Fluid studies consistent with exudative effusion.   - s/p thoracentesis on 3/13 with R chest tube placement. Chest tube removed 3/15.   - s/p  pRBC transfusion on 3/12. 3/16  - Monitor resp status  - Appreciate CHF team recs regarding fluid status. Continue dialysis for fluid removal      #RENAL   #GILMER- 2/2 ischemic ATN  -Unknown baseline Cr presenting with Cr 3.93 with metabolic derangements.   - Likely 2/2 Sepsis, low intravascular volume and CHF  - Trend BUN and Cr.  - Renal team on board, pt with R HD catheter placed by IR on 3/21. c/w CVVHD currently removing 75cc/hour   - CT abdomen and pelvis without acute obstruction.   - retroperitoneal ultrasound showing medical renal disease.     #Hyperkalemia   -Resolved   - PT with elevated K to 5.7 on admission,  no EKG changes   - Continue telemetry monitoring  - Trend K   - Can given Kayexalate if persists   - c/w dialysis     #Metabolic acidosis   - 2/2 Lactate, starvation ketoacidosis and uremia   - Increased overnight. No resolved       #GI   - pt wit Elevated Bilirubin and Alk phos. Abd exam benign  - CT abdomen/pelvis consistent with cholelithiasis and ascites.     #HEME    #Thrombocytopenia  - ELVIRA negative but PF4 was positive. Unlikely HIT.    - Could have be related to Vanc/zosyn.   - Improving, ctm now that back on zosyn     # elevated INR. Unclear etiology. Pt on coumadin?   - Continue with daily coumadin dosing   - Given Vit K and FFP3/12. FFP 3/13   - Monitor coags    #Anemia  - Stable at 8-9. Anemia of chronic disease.  -s/p 1pRBC on 3/12, 3/16   - WIll keep Hb around 9-10     #ENDOCRINE   - S/p insulin drip  - Decreasing Lantus to 5U as we taper down steroids. Monitor blood glucose and ISS coverage. pt with episodes of hypoglycemia.   - Monitor blood glucose   - A1C 8.6%      F: No IVF   E: Replete K<4 Mg<2, caution in setting of acute renal failure  N: OG tube placed, feeds at goal     Lines:  ETT (3/22), OG tube (3/21)R HD catheter (3/21). L IJ 3/21, PICC line 3/20    Full code   Dispo: MICU  Ppx: Coumadin

## 2018-03-25 NOTE — PROGRESS NOTE ADULT - PROBLEM SELECTOR PLAN 1
- oliguric GILMER from ATN from septic shock   - On CVVHD - 75 ml/h - we will continue wit that   - according to the chart neutral 24 hours   - volume status on the wet side   - in and outs   - daily weight   - electrolytes noted- potassium and phosphate - on the low side - replete with kPo4 - 30 mmol/l and follow up   - please consider - decreasing the iv fluids input

## 2018-03-25 NOTE — PROGRESS NOTE ADULT - SUBJECTIVE AND OBJECTIVE BOX
*** THIS NOTE IS IN PROGRESS***    INTERVAL HPI/OVERNIGHT EVENTS:    OVERNIGHT: No overnight events.  SUBJECTIVE: Patient seen and examined at bedside.     ROS:  CV: Denies chest pain  Resp: Denies SOB  GI: Denies abdominal pain, constipation, diarrhea, nausea, vomiting  : Denies dysuria  ID: Denies fevers, chills  MSK: Denies joint pain     OBJECTIVE:    VITAL SIGNS:  ICU Vital Signs Last 24 Hrs  T(C): 36.4 (25 Mar 2018 01:00), Max: 37.5 (24 Mar 2018 18:35)  T(F): 97.5 (25 Mar 2018 01:00), Max: 99.5 (24 Mar 2018 18:35)  HR: 76 (25 Mar 2018 05:00) (70 - 102)  BP: --  BP(mean): --  ABP: 82/56 (25 Mar 2018 05:00) (80/50 - 102/66)  ABP(mean): 64 (25 Mar 2018 05:00) (62 - 80)  RR: 13 (25 Mar 2018 05:00) (6 - 15)  SpO2: 100% (25 Mar 2018 05:00) (95% - 100%)    Mode: AC/ CMV (Assist Control/ Continuous Mandatory Ventilation), RR (machine): 14, TV (machine): 450, FiO2: 40, PEEP: 5, ITime: 0.9, MAP: 8, PIP: 20    03-23 @ 07:01  -  03-24 @ 07:00  --------------------------------------------------------  IN: 1342.6 mL / OUT: 1170 mL / NET: 172.6 mL    03-24 @ 07:01  -  03-25 @ 06:05  --------------------------------------------------------  IN: 1317 mL / OUT: 1408 mL / NET: -91 mL      CAPILLARY BLOOD GLUCOSE      POCT Blood Glucose.: 230 mg/dL (25 Mar 2018 05:58)      PHYSICAL EXAM:    General: NAD, comfortable  HEENT: NCAT, PERRL, clear conjunctiva, no scleral icterus  Neck: supple, no JVD  Respiratory: CTA b/l, no wheezing, rhonchi, rales  Cardiovascular: RRR, normal S1S2, no M/R/G  Abdomen: soft, NT/ND, bowel sounds in all four quadrants, no palpable masses  Extremities: WWP, no clubbing, cyanosis, or edema  Neuro:     MEDICATIONS:  MEDICATIONS  (STANDING):  aspirin  chewable 81 milliGRAM(s) Oral daily  atorvastatin 80 milliGRAM(s) Oral at bedtime  chlorhexidine 0.12% Liquid 15 milliLiter(s) Swish and Spit two times a day  chlorhexidine 2% Cloths 1 Application(s) Topical daily  dextrose 5%. 1000 milliLiter(s) (50 mL/Hr) IV Continuous <Continuous>  dextrose 50% Injectable 12.5 Gram(s) IV Push once  dextrose 50% Injectable 25 Gram(s) IV Push once  dextrose 50% Injectable 25 Gram(s) IV Push once  escitalopram 5 milliGRAM(s) Oral daily  folic acid 1 milliGRAM(s) Oral daily  hydrocortisone sodium succinate Injectable 50 milliGRAM(s) IV Push every 6 hours  insulin lispro (HumaLOG) corrective regimen sliding scale   SubCutaneous every 6 hours  multivitamin 1 Tablet(s) Oral daily  norepinephrine Infusion 0.01 MICROgram(s)/kG/Min (1.5 mL/Hr) IV Continuous <Continuous>  propofol Infusion 0.01 MICROgram(s)/kG/Min (0.005 mL/Hr) IV Continuous <Continuous>  PureFlow Dialysate RFP-400 (K 2 / Ca 3) 5000 milliLiter(s) (1500 mL/Hr) CRRT <Continuous>  senna 2 Tablet(s) Oral at bedtime  sodium chloride 0.9% lock flush 20 milliLiter(s) IV Push once  thiamine 100 milliGRAM(s) Oral daily  vasopressin Infusion 0.01 Unit(s)/Min (0.6 mL/Hr) IV Continuous <Continuous>    MEDICATIONS  (PRN):  acetaminophen   Tablet. 650 milliGRAM(s) Oral every 6 hours PRN Moderate Pain (4 - 6)  dextrose Gel 1 Dose(s) Oral once PRN Blood Glucose LESS THAN 70 milliGRAM(s)/deciliter  glucagon  Injectable 1 milliGRAM(s) IntraMuscular once PRN Glucose LESS THAN 70 milligrams/deciliter  sodium chloride 0.9% lock flush 10 milliLiter(s) IV Push every 1 hour PRN After each medication administration  sodium chloride 0.9% lock flush 10 milliLiter(s) IV Push every 12 hours PRN Lumen of catheter NOT used      ALLERGIES:  Allergies    Allergy Status Unknown    Intolerances        LABS:                        8.3    10.5  )-----------( 93       ( 25 Mar 2018 01:52 )             26.5     03-25    133<L>  |  98  |  30<H>  ----------------------------<  294<H>  3.5   |  23  |  1.80<H>    Ca    8.6      25 Mar 2018 01:52  Phos  2.8     03-24  Mg     2.1     03-24      PT/INR - ( 24 Mar 2018 05:45 )   PT: 22.2 sec;   INR: 1.97          PTT - ( 24 Mar 2018 05:45 )  PTT:55.3 sec      RADIOLOGY & ADDITIONAL TESTS: Reviewed. INTERVAL HPI/OVERNIGHT EVENTS:    OVERNIGHT: No overnight events. Patient did not go for head CT due to dual pressor requirements, CVVD and clinical instability. This AM urine noted to be purulent with increased WBC, sent UA/UC and re-started zosyn.   SUBJECTIVE: Patient seen and examined at bedside. Minimally responsive. Did open eyes to voice but did not track examiner or follow commands.     ROS:  Unobtainable due to patient's MS.     OBJECTIVE:    VITAL SIGNS:  ICU Vital Signs Last 24 Hrs  T(C): 36.4 (25 Mar 2018 01:00), Max: 37.5 (24 Mar 2018 18:35)  T(F): 97.5 (25 Mar 2018 01:00), Max: 99.5 (24 Mar 2018 18:35)  HR: 76 (25 Mar 2018 05:00) (70 - 102)  BP: --  BP(mean): --  ABP: 82/56 (25 Mar 2018 05:00) (80/50 - 102/66)  ABP(mean): 64 (25 Mar 2018 05:00) (62 - 80)  RR: 13 (25 Mar 2018 05:00) (6 - 15)  SpO2: 100% (25 Mar 2018 05:00) (95% - 100%)    Mode: AC/ CMV (Assist Control/ Continuous Mandatory Ventilation), RR (machine): 14, TV (machine): 450, FiO2: 40, PEEP: 5, ITime: 0.9, MAP: 8, PIP: 20    03-23 @ 07:01  -  03-24 @ 07:00  --------------------------------------------------------  IN: 1342.6 mL / OUT: 1170 mL / NET: 172.6 mL    03-24 @ 07:01  -  03-25 @ 06:05  --------------------------------------------------------  IN: 1317 mL / OUT: 1408 mL / NET: -91 mL      CAPILLARY BLOOD GLUCOSE      POCT Blood Glucose.: 230 mg/dL (25 Mar 2018 05:58)      PHYSICAL EXAM:    General: Intubated, sedated.   HEENT: NCAT, Pupils ~3mm and reactive to light, equal and round. Opens eyes to voice, no purposeful eye movements.   Neck: supple, no JVD  Respiratory: CTA b/l, no wheezing, rhonchi, rales  Cardiovascular: RRR, normal S1S2, no M/R/G  Abdomen: soft, NT/ND, bowel sounds in all four quadrants, no palpable masses  Extremities: WWP, 3+ edema bilateral lower extremities, extending to scrotum  Neuro: Opens eyes to voice, pupil exam as above. Does not follow commands. No spontaneous movement of extremities. Does not withdraw to pain RUE or LUE. Moved head spontaneously.   Lines: CVVP port right sided, A line left arm, peripheral IV left arm, OG tube, ET tube, L sided IJ.     MEDICATIONS:  MEDICATIONS  (STANDING):  aspirin  chewable 81 milliGRAM(s) Oral daily  atorvastatin 80 milliGRAM(s) Oral at bedtime  chlorhexidine 0.12% Liquid 15 milliLiter(s) Swish and Spit two times a day  chlorhexidine 2% Cloths 1 Application(s) Topical daily  dextrose 5%. 1000 milliLiter(s) (50 mL/Hr) IV Continuous <Continuous>  dextrose 50% Injectable 12.5 Gram(s) IV Push once  dextrose 50% Injectable 25 Gram(s) IV Push once  dextrose 50% Injectable 25 Gram(s) IV Push once  escitalopram 5 milliGRAM(s) Oral daily  folic acid 1 milliGRAM(s) Oral daily  hydrocortisone sodium succinate Injectable 50 milliGRAM(s) IV Push every 6 hours  insulin lispro (HumaLOG) corrective regimen sliding scale   SubCutaneous every 6 hours  multivitamin 1 Tablet(s) Oral daily  norepinephrine Infusion 0.01 MICROgram(s)/kG/Min (1.5 mL/Hr) IV Continuous <Continuous>  propofol Infusion 0.01 MICROgram(s)/kG/Min (0.005 mL/Hr) IV Continuous <Continuous>  PureFlow Dialysate RFP-400 (K 2 / Ca 3) 5000 milliLiter(s) (1500 mL/Hr) CRRT <Continuous>  senna 2 Tablet(s) Oral at bedtime  sodium chloride 0.9% lock flush 20 milliLiter(s) IV Push once  thiamine 100 milliGRAM(s) Oral daily  vasopressin Infusion 0.01 Unit(s)/Min (0.6 mL/Hr) IV Continuous <Continuous>    MEDICATIONS  (PRN):  acetaminophen   Tablet. 650 milliGRAM(s) Oral every 6 hours PRN Moderate Pain (4 - 6)  dextrose Gel 1 Dose(s) Oral once PRN Blood Glucose LESS THAN 70 milliGRAM(s)/deciliter  glucagon  Injectable 1 milliGRAM(s) IntraMuscular once PRN Glucose LESS THAN 70 milligrams/deciliter  sodium chloride 0.9% lock flush 10 milliLiter(s) IV Push every 1 hour PRN After each medication administration  sodium chloride 0.9% lock flush 10 milliLiter(s) IV Push every 12 hours PRN Lumen of catheter NOT used      ALLERGIES:  Allergies    Allergy Status Unknown    Intolerances        LABS:                        8.3    10.5  )-----------( 93       ( 25 Mar 2018 01:52 )             26.5     03-25    133<L>  |  98  |  30<H>  ----------------------------<  294<H>  3.5   |  23  |  1.80<H>    Ca    8.6      25 Mar 2018 01:52  Phos  2.8     03-24  Mg     2.1     03-24      PT/INR - ( 24 Mar 2018 05:45 )   PT: 22.2 sec;   INR: 1.97          PTT - ( 24 Mar 2018 05:45 )  PTT:55.3 sec      RADIOLOGY & ADDITIONAL TESTS: Reviewed.

## 2018-03-25 NOTE — PROGRESS NOTE ADULT - ATTENDING COMMENTS
Patient examined, fellow's hx and PE reviewed and confirmed. I find GILMER on CVVHD. A/P reviewed and confirmed. Continue CVVHD. See full note.

## 2018-03-25 NOTE — PROGRESS NOTE ADULT - SUBJECTIVE AND OBJECTIVE BOX
the patient seen in the morning - intubated, on pressor support, still on CVVHD no issues, UF - 75 ml/h, no urine output according to the nurse. 24 hours neutral     acetaminophen   Tablet. 650 milliGRAM(s) every 6 hours PRN  aspirin  chewable 81 milliGRAM(s) daily  atorvastatin 80 milliGRAM(s) at bedtime  chlorhexidine 0.12% Liquid 15 milliLiter(s) two times a day  chlorhexidine 2% Cloths 1 Application(s) daily  dextrose 5%. 1000 milliLiter(s) <Continuous>  dextrose 50% Injectable 12.5 Gram(s) once  dextrose 50% Injectable 25 Gram(s) once  dextrose 50% Injectable 25 Gram(s) once  dextrose Gel 1 Dose(s) once PRN  escitalopram 5 milliGRAM(s) daily  folic acid 1 milliGRAM(s) daily  glucagon  Injectable 1 milliGRAM(s) once PRN  hydrocortisone sodium succinate Injectable 50 milliGRAM(s) every 6 hours  insulin lispro (HumaLOG) corrective regimen sliding scale   every 6 hours  multivitamin 1 Tablet(s) daily  norepinephrine Infusion 0.01 MICROgram(s)/kG/Min <Continuous>  potassium chloride  20 mEq/100 mL IVPB 20 milliEquivalent(s) every 2 hours  propofol Infusion 0.01 MICROgram(s)/kG/Min <Continuous>  PureFlow Dialysate RFP-400 (K 2 / Ca 3) 5000 milliLiter(s) <Continuous>  senna 2 Tablet(s) at bedtime  sodium chloride 0.9% lock flush 10 milliLiter(s) every 1 hour PRN  sodium chloride 0.9% lock flush 10 milliLiter(s) every 12 hours PRN  sodium chloride 0.9% lock flush 20 milliLiter(s) once  thiamine 100 milliGRAM(s) daily  vasopressin Infusion 0.01 Unit(s)/Min <Continuous>      Allergies    Allergy Status Unknown    Intolerances        T(C): , Max: 37.5 (03-24-18 @ 18:35)  T(F): , Max: 99.5 (03-24-18 @ 18:35)  HR: 78 (03-25-18 @ 11:30)  BP: 77/68 (03-25-18 @ 08:30)  BP(mean): 79 (03-25-18 @ 08:30)  RR: 14 (03-25-18 @ 11:30)  SpO2: 98% (03-25-18 @ 11:30)  Wt(kg): --    03-24 @ 07:01  -  03-25 @ 07:00  --------------------------------------------------------  IN:    Enteral Tube Flush: 80 mL    IV PiggyBack: 100 mL    Nepro with Carb Steady: 1320 mL    norepinephrine Infusion: 54 mL    propofol Infusion: 85 mL    vasopressin Infusion: 55.2 mL  Total IN: 1694.2 mL    OUT:    Other: 1699 mL  Total OUT: 1699 mL    Total NET: -4.8 mL      03-25 @ 07:01  -  03-25 @ 11:45  --------------------------------------------------------  IN:    Nepro with Carb Steady: 165 mL    norepinephrine Infusion: 12 mL    propofol Infusion: 23 mL    vasopressin Infusion: 9.6 mL  Total IN: 209.6 mL    OUT:    Other: 147 mL  Total OUT: 147 mL    Total NET: 62.6 mL      Physical exam:   Intubated and sedated   No JVD   Normal air entry into the lungs, no wheezing, no crackles   RRR, normal s1, s2, no murmurs, rubs or gallops   Abdomen - soft, not tender, not distended   Extremities: no edema   HAs a HD catheter on the right of his neck            LABS:                        8.5    9.6   )-----------( 99       ( 25 Mar 2018 06:14 )             27.2     03-25    136  |  99  |  28<H>  ----------------------------<  232<H>  3.4<L>   |  24  |  1.80<H>    Ca    8.6      25 Mar 2018 06:14  Phos  2.2     03-25  Mg     2.0     03-25        PT/INR - ( 25 Mar 2018 06:14 )   PT: 21.9 sec;   INR: 1.95          PTT - ( 25 Mar 2018 06:14 )  PTT:45.1 sec          RADIOLOGY & ADDITIONAL STUDIES:

## 2018-03-26 LAB
ALBUMIN SERPL ELPH-MCNC: 3.3 G/DL — SIGNIFICANT CHANGE UP (ref 3.3–5)
ALP SERPL-CCNC: 234 U/L — HIGH (ref 40–120)
ALT FLD-CCNC: 27 U/L — SIGNIFICANT CHANGE UP (ref 10–45)
AMMONIA BLD-MCNC: 20 UMOL/L — SIGNIFICANT CHANGE UP (ref 11–55)
ANION GAP SERPL CALC-SCNC: 13 MMOL/L — SIGNIFICANT CHANGE UP (ref 5–17)
ANION GAP SERPL CALC-SCNC: 13 MMOL/L — SIGNIFICANT CHANGE UP (ref 5–17)
ANION GAP SERPL CALC-SCNC: 14 MMOL/L — SIGNIFICANT CHANGE UP (ref 5–17)
ANION GAP SERPL CALC-SCNC: 14 MMOL/L — SIGNIFICANT CHANGE UP (ref 5–17)
APTT BLD: 45 SEC — HIGH (ref 27.5–37.4)
AST SERPL-CCNC: 27 U/L — SIGNIFICANT CHANGE UP (ref 10–40)
BILIRUB DIRECT SERPL-MCNC: 0.7 MG/DL — HIGH (ref 0–0.2)
BILIRUB INDIRECT FLD-MCNC: 1.2 MG/DL — HIGH (ref 0.2–1)
BILIRUB SERPL-MCNC: 1.9 MG/DL — HIGH (ref 0.2–1.2)
BLD GP AB SCN SERPL QL: NEGATIVE — SIGNIFICANT CHANGE UP
BUN SERPL-MCNC: 30 MG/DL — HIGH (ref 7–23)
BUN SERPL-MCNC: 32 MG/DL — HIGH (ref 7–23)
BUN SERPL-MCNC: 33 MG/DL — HIGH (ref 7–23)
BUN SERPL-MCNC: 36 MG/DL — HIGH (ref 7–23)
CALCIUM SERPL-MCNC: 8.3 MG/DL — LOW (ref 8.4–10.5)
CALCIUM SERPL-MCNC: 8.5 MG/DL — SIGNIFICANT CHANGE UP (ref 8.4–10.5)
CALCIUM SERPL-MCNC: 8.6 MG/DL — SIGNIFICANT CHANGE UP (ref 8.4–10.5)
CALCIUM SERPL-MCNC: 8.8 MG/DL — SIGNIFICANT CHANGE UP (ref 8.4–10.5)
CHLORIDE SERPL-SCNC: 97 MMOL/L — SIGNIFICANT CHANGE UP (ref 96–108)
CHLORIDE SERPL-SCNC: 98 MMOL/L — SIGNIFICANT CHANGE UP (ref 96–108)
CHLORIDE SERPL-SCNC: 99 MMOL/L — SIGNIFICANT CHANGE UP (ref 96–108)
CHLORIDE SERPL-SCNC: 99 MMOL/L — SIGNIFICANT CHANGE UP (ref 96–108)
CO2 SERPL-SCNC: 21 MMOL/L — LOW (ref 22–31)
CO2 SERPL-SCNC: 21 MMOL/L — LOW (ref 22–31)
CO2 SERPL-SCNC: 22 MMOL/L — SIGNIFICANT CHANGE UP (ref 22–31)
CO2 SERPL-SCNC: 22 MMOL/L — SIGNIFICANT CHANGE UP (ref 22–31)
CREAT SERPL-MCNC: 1.69 MG/DL — HIGH (ref 0.5–1.3)
CREAT SERPL-MCNC: 1.77 MG/DL — HIGH (ref 0.5–1.3)
CREAT SERPL-MCNC: 1.77 MG/DL — HIGH (ref 0.5–1.3)
CREAT SERPL-MCNC: 1.86 MG/DL — HIGH (ref 0.5–1.3)
CULTURE RESULTS: SIGNIFICANT CHANGE UP
GLUCOSE BLDC GLUCOMTR-MCNC: 218 MG/DL — HIGH (ref 70–99)
GLUCOSE BLDC GLUCOMTR-MCNC: 269 MG/DL — HIGH (ref 70–99)
GLUCOSE BLDC GLUCOMTR-MCNC: 302 MG/DL — HIGH (ref 70–99)
GLUCOSE BLDC GLUCOMTR-MCNC: 332 MG/DL — HIGH (ref 70–99)
GLUCOSE SERPL-MCNC: 280 MG/DL — HIGH (ref 70–99)
GLUCOSE SERPL-MCNC: 326 MG/DL — HIGH (ref 70–99)
GLUCOSE SERPL-MCNC: 349 MG/DL — HIGH (ref 70–99)
GLUCOSE SERPL-MCNC: 350 MG/DL — HIGH (ref 70–99)
HCT VFR BLD CALC: 26.9 % — LOW (ref 39–50)
HCT VFR BLD CALC: 27.1 % — LOW (ref 39–50)
HCT VFR BLD CALC: 29.1 % — LOW (ref 39–50)
HGB BLD-MCNC: 8.4 G/DL — LOW (ref 13–17)
HGB BLD-MCNC: 8.6 G/DL — LOW (ref 13–17)
HGB BLD-MCNC: 9.1 G/DL — LOW (ref 13–17)
INR BLD: 2.22 — HIGH (ref 0.88–1.16)
LYMPHOCYTES # BLD AUTO: 4 % — LOW (ref 13–44)
MAGNESIUM SERPL-MCNC: 1.9 MG/DL — SIGNIFICANT CHANGE UP (ref 1.6–2.6)
MAGNESIUM SERPL-MCNC: 2.1 MG/DL — SIGNIFICANT CHANGE UP (ref 1.6–2.6)
MCHC RBC-ENTMCNC: 30.6 PG — SIGNIFICANT CHANGE UP (ref 27–34)
MCHC RBC-ENTMCNC: 30.7 PG — SIGNIFICANT CHANGE UP (ref 27–34)
MCHC RBC-ENTMCNC: 31.2 G/DL — LOW (ref 32–36)
MCHC RBC-ENTMCNC: 31.2 PG — SIGNIFICANT CHANGE UP (ref 27–34)
MCHC RBC-ENTMCNC: 31.3 G/DL — LOW (ref 32–36)
MCHC RBC-ENTMCNC: 31.7 G/DL — LOW (ref 32–36)
MCV RBC AUTO: 98 FL — SIGNIFICANT CHANGE UP (ref 80–100)
MCV RBC AUTO: 98.2 FL — SIGNIFICANT CHANGE UP (ref 80–100)
MCV RBC AUTO: 98.2 FL — SIGNIFICANT CHANGE UP (ref 80–100)
MONOCYTES NFR BLD AUTO: 5.3 % — SIGNIFICANT CHANGE UP (ref 2–14)
NEUTROPHILS NFR BLD AUTO: 90.7 % — HIGH (ref 43–77)
PHOSPHATE SERPL-MCNC: 2.1 MG/DL — LOW (ref 2.5–4.5)
PHOSPHATE SERPL-MCNC: 2.7 MG/DL — SIGNIFICANT CHANGE UP (ref 2.5–4.5)
PLATELET # BLD AUTO: 111 K/UL — LOW (ref 150–400)
PLATELET # BLD AUTO: 130 K/UL — LOW (ref 150–400)
PLATELET # BLD AUTO: 96 K/UL — LOW (ref 150–400)
POTASSIUM SERPL-MCNC: 3.8 MMOL/L — SIGNIFICANT CHANGE UP (ref 3.5–5.3)
POTASSIUM SERPL-MCNC: 3.8 MMOL/L — SIGNIFICANT CHANGE UP (ref 3.5–5.3)
POTASSIUM SERPL-MCNC: 3.9 MMOL/L — SIGNIFICANT CHANGE UP (ref 3.5–5.3)
POTASSIUM SERPL-MCNC: 4 MMOL/L — SIGNIFICANT CHANGE UP (ref 3.5–5.3)
POTASSIUM SERPL-SCNC: 3.8 MMOL/L — SIGNIFICANT CHANGE UP (ref 3.5–5.3)
POTASSIUM SERPL-SCNC: 3.8 MMOL/L — SIGNIFICANT CHANGE UP (ref 3.5–5.3)
POTASSIUM SERPL-SCNC: 3.9 MMOL/L — SIGNIFICANT CHANGE UP (ref 3.5–5.3)
POTASSIUM SERPL-SCNC: 4 MMOL/L — SIGNIFICANT CHANGE UP (ref 3.5–5.3)
PROT SERPL-MCNC: 6.5 G/DL — SIGNIFICANT CHANGE UP (ref 6–8.3)
PROTHROM AB SERPL-ACNC: 25 SEC — HIGH (ref 9.8–12.7)
RBC # BLD: 2.74 M/UL — LOW (ref 4.2–5.8)
RBC # BLD: 2.76 M/UL — LOW (ref 4.2–5.8)
RBC # BLD: 2.97 M/UL — LOW (ref 4.2–5.8)
RBC # FLD: 20.1 % — HIGH (ref 10.3–16.9)
RBC # FLD: 20.2 % — HIGH (ref 10.3–16.9)
RBC # FLD: 20.4 % — HIGH (ref 10.3–16.9)
RH IG SCN BLD-IMP: POSITIVE — SIGNIFICANT CHANGE UP
SODIUM SERPL-SCNC: 132 MMOL/L — LOW (ref 135–145)
SODIUM SERPL-SCNC: 133 MMOL/L — LOW (ref 135–145)
SODIUM SERPL-SCNC: 134 MMOL/L — LOW (ref 135–145)
SODIUM SERPL-SCNC: 134 MMOL/L — LOW (ref 135–145)
SPECIMEN SOURCE: SIGNIFICANT CHANGE UP
WBC # BLD: 10 K/UL — SIGNIFICANT CHANGE UP (ref 3.8–10.5)
WBC # BLD: 10 K/UL — SIGNIFICANT CHANGE UP (ref 3.8–10.5)
WBC # BLD: 11.4 K/UL — HIGH (ref 3.8–10.5)
WBC # FLD AUTO: 10 K/UL — SIGNIFICANT CHANGE UP (ref 3.8–10.5)
WBC # FLD AUTO: 10 K/UL — SIGNIFICANT CHANGE UP (ref 3.8–10.5)
WBC # FLD AUTO: 11.4 K/UL — HIGH (ref 3.8–10.5)

## 2018-03-26 PROCEDURE — 99233 SBSQ HOSP IP/OBS HIGH 50: CPT | Mod: GC

## 2018-03-26 PROCEDURE — 90945 DIALYSIS ONE EVALUATION: CPT

## 2018-03-26 PROCEDURE — 70551 MRI BRAIN STEM W/O DYE: CPT | Mod: 26

## 2018-03-26 PROCEDURE — 93975 VASCULAR STUDY: CPT | Mod: 26

## 2018-03-26 PROCEDURE — 99232 SBSQ HOSP IP/OBS MODERATE 35: CPT

## 2018-03-26 PROCEDURE — 71045 X-RAY EXAM CHEST 1 VIEW: CPT | Mod: 26

## 2018-03-26 PROCEDURE — 95951: CPT | Mod: 26

## 2018-03-26 PROCEDURE — 93283 PRGRMG EVAL IMPLANTABLE DFB: CPT | Mod: 26

## 2018-03-26 PROCEDURE — 99291 CRITICAL CARE FIRST HOUR: CPT

## 2018-03-26 RX ORDER — POTASSIUM PHOSPHATE, MONOBASIC POTASSIUM PHOSPHATE, DIBASIC 236; 224 MG/ML; MG/ML
30 INJECTION, SOLUTION INTRAVENOUS ONCE
Qty: 0 | Refills: 0 | Status: COMPLETED | OUTPATIENT
Start: 2018-03-26 | End: 2018-03-26

## 2018-03-26 RX ORDER — ACETAMINOPHEN 500 MG
650 TABLET ORAL EVERY 6 HOURS
Qty: 0 | Refills: 0 | Status: DISCONTINUED | OUTPATIENT
Start: 2018-03-26 | End: 2018-05-25

## 2018-03-26 RX ORDER — HUMAN INSULIN 100 [IU]/ML
6 INJECTION, SUSPENSION SUBCUTANEOUS ONCE
Qty: 0 | Refills: 0 | Status: COMPLETED | OUTPATIENT
Start: 2018-03-26 | End: 2018-03-26

## 2018-03-26 RX ORDER — WARFARIN SODIUM 2.5 MG/1
3 TABLET ORAL ONCE
Qty: 0 | Refills: 0 | Status: COMPLETED | OUTPATIENT
Start: 2018-03-26 | End: 2018-03-26

## 2018-03-26 RX ORDER — ERGOCALCIFEROL 1.25 MG/1
50000 CAPSULE ORAL
Qty: 0 | Refills: 0 | Status: DISCONTINUED | OUTPATIENT
Start: 2018-03-26 | End: 2018-03-27

## 2018-03-26 RX ORDER — ERGOCALCIFEROL 1.25 MG/1
50000 CAPSULE ORAL
Qty: 0 | Refills: 0 | Status: DISCONTINUED | OUTPATIENT
Start: 2018-03-26 | End: 2018-03-26

## 2018-03-26 RX ORDER — INSULIN GLARGINE 100 [IU]/ML
20 INJECTION, SOLUTION SUBCUTANEOUS AT BEDTIME
Qty: 0 | Refills: 0 | Status: DISCONTINUED | OUTPATIENT
Start: 2018-03-26 | End: 2018-03-27

## 2018-03-26 RX ORDER — VASOPRESSIN 20 [USP'U]/ML
0.04 INJECTION INTRAVENOUS
Qty: 100 | Refills: 0 | Status: DISCONTINUED | OUTPATIENT
Start: 2018-03-26 | End: 2018-03-27

## 2018-03-26 RX ADMIN — CHLORHEXIDINE GLUCONATE 15 MILLILITER(S): 213 SOLUTION TOPICAL at 06:47

## 2018-03-26 RX ADMIN — WARFARIN SODIUM 3 MILLIGRAM(S): 2.5 TABLET ORAL at 22:22

## 2018-03-26 RX ADMIN — Medication 6: at 22:22

## 2018-03-26 RX ADMIN — Medication 50 MILLIGRAM(S): at 16:33

## 2018-03-26 RX ADMIN — HUMAN INSULIN 6 UNIT(S): 100 INJECTION, SUSPENSION SUBCUTANEOUS at 16:33

## 2018-03-26 RX ADMIN — Medication 100 MILLIGRAM(S): at 11:23

## 2018-03-26 RX ADMIN — Medication 81 MILLIGRAM(S): at 11:23

## 2018-03-26 RX ADMIN — Medication 4: at 06:52

## 2018-03-26 RX ADMIN — PANTOPRAZOLE SODIUM 40 MILLIGRAM(S): 20 TABLET, DELAYED RELEASE ORAL at 11:23

## 2018-03-26 RX ADMIN — ATORVASTATIN CALCIUM 80 MILLIGRAM(S): 80 TABLET, FILM COATED ORAL at 22:23

## 2018-03-26 RX ADMIN — Medication 1 MILLIGRAM(S): at 11:24

## 2018-03-26 RX ADMIN — Medication 50 MILLIGRAM(S): at 22:22

## 2018-03-26 RX ADMIN — CHLORHEXIDINE GLUCONATE 1 APPLICATION(S): 213 SOLUTION TOPICAL at 11:23

## 2018-03-26 RX ADMIN — CHLORHEXIDINE GLUCONATE 15 MILLILITER(S): 213 SOLUTION TOPICAL at 17:52

## 2018-03-26 RX ADMIN — INSULIN GLARGINE 20 UNIT(S): 100 INJECTION, SOLUTION SUBCUTANEOUS at 22:21

## 2018-03-26 RX ADMIN — Medication 8: at 11:22

## 2018-03-26 RX ADMIN — VASOPRESSIN 0.6 UNIT(S)/MIN: 20 INJECTION INTRAVENOUS at 10:01

## 2018-03-26 RX ADMIN — Medication 8: at 17:51

## 2018-03-26 RX ADMIN — PIPERACILLIN AND TAZOBACTAM 200 GRAM(S): 4; .5 INJECTION, POWDER, LYOPHILIZED, FOR SOLUTION INTRAVENOUS at 06:48

## 2018-03-26 RX ADMIN — Medication 50 MILLIGRAM(S): at 10:01

## 2018-03-26 RX ADMIN — Medication 50 MILLIGRAM(S): at 05:00

## 2018-03-26 RX ADMIN — Medication 1 TABLET(S): at 11:23

## 2018-03-26 RX ADMIN — ESCITALOPRAM OXALATE 5 MILLIGRAM(S): 10 TABLET, FILM COATED ORAL at 11:24

## 2018-03-26 RX ADMIN — POTASSIUM PHOSPHATE, MONOBASIC POTASSIUM PHOSPHATE, DIBASIC 85 MILLIMOLE(S): 236; 224 INJECTION, SOLUTION INTRAVENOUS at 16:34

## 2018-03-26 RX ADMIN — PIPERACILLIN AND TAZOBACTAM 200 GRAM(S): 4; .5 INJECTION, POWDER, LYOPHILIZED, FOR SOLUTION INTRAVENOUS at 11:23

## 2018-03-26 NOTE — PROGRESS NOTE ADULT - PROBLEM SELECTOR PLAN 5
Patient's Calcium and phosphate noted   Obtain intact PTH - 286, vitamin d - 8.6 - please consider 50 000 weekly   Low phos/renal diet.

## 2018-03-26 NOTE — PROGRESS NOTE ADULT - SUBJECTIVE AND OBJECTIVE BOX
Neurology Stroke Follow Up     Interval History:  Patient seen and examined.  Intubated, off CVVH  He had MRI Brain so request review.  As per team,     Medications:  MEDICATIONS  (STANDING):  aspirin  chewable 81 milliGRAM(s) Oral daily  atorvastatin 80 milliGRAM(s) Oral at bedtime  chlorhexidine 0.12% Liquid 15 milliLiter(s) Swish and Spit two times a day  chlorhexidine 2% Cloths 1 Application(s) Topical daily  escitalopram 5 milliGRAM(s) Oral daily  folic acid 1 milliGRAM(s) Oral daily  hydrocortisone sodium succinate Injectable 50 milliGRAM(s) IV Push every 6 hours  insulin lispro (HumaLOG) corrective regimen sliding scale   SubCutaneous every 6 hours  multivitamin 1 Tablet(s) Oral daily  norepinephrine Infusion 0.01 MICROgram(s)/kG/Min (1.5 mL/Hr) IV Continuous <Continuous>  propofol Infusion 0.01 MICROgram(s)/kG/Min (0.005 mL/Hr) IV Continuous <Continuous>  PureFlow Dialysate RFP-400 (K 2 / Ca 3) 5000 milliLiter(s) (1500 mL/Hr) CRRT <Continuous>  senna 2 Tablet(s) Oral at bedtime  sodium chloride 0.9% lock flush 20 milliLiter(s) IV Push once  thiamine 100 milliGRAM(s) Oral daily  vasopressin Infusion 0.01 Unit(s)/Min (0.6 mL/Hr) IV Continuous <Continuous>    MEDICATIONS  (PRN):  acetaminophen   Tablet. 650 milliGRAM(s) Oral every 6 hours PRN Moderate Pain (4 - 6)  dextrose Gel 1 Dose(s) Oral once PRN Blood Glucose LESS THAN 70 milliGRAM(s)/deciliter  glucagon  Injectable 1 milliGRAM(s) IntraMuscular once PRN Glucose LESS THAN 70 milligrams/deciliter  sodium chloride 0.9% lock flush 10 milliLiter(s) IV Push every 1 hour PRN After each medication administration  sodium chloride 0.9% lock flush 10 milliLiter(s) IV Push every 12 hours PRN Lumen of catheter NOT used    Allergies    Allergy Status Unknown    Exam:  Vital - 90/62, HR 86, RR 14, 98.8F    Gen: Lying in bed, intubated, EEG in place  Neuro:  Mental status: slight opening of his eyes to voice, no other response, not following commands  Cranial nerves: eyes midline, pupils equally round and reactive to light, + corneals, hard to assess facial due to ETT    Motor:  no spontaneous movement     Sensation: no response to pain   Reflexes: absent Babinski bilaterally  Gait: deferred    Labs:                        8.3    10.8  )-----------( 91       ( 24 Mar 2018 19:41 )             26.8     03-24    135  |  99  |  28<H>  ----------------------------<  276<H>  4.2   |  23  |  1.85<H>    Ca    8.3<L>      24 Mar 2018 19:41  Phos  2.8     03-24  Mg     2.1     03-24      PT/INR - ( 24 Mar 2018 05:45 )   PT: 22.2 sec;   INR: 1.97     PTT - ( 24 Mar 2018 05:45 )  PTT:55.3 sec    Hemoglobin A1C, Whole Blood: 8.6 % (03-10 @ 23:31)    Radiology:  no new cerebral imaging    Assessment: 63 year-old male with PMH CHF, s/p AICD vs PPM, ?Afib, HTN, DM2 on insulin, CKD, and gout presented with septic shock 2/2 cellulitis c/b cardiogenic shock, became unresponsive after A-line placement yesterday. Unclear that ischemia based on workup to date and therapeutic INR.      Plan:  1) Follow up vEEG read  2) Repeat CT Head today with further recs. Neurology Stroke Follow Up     Interval History:  Patient seen and examined.  Intubated, off CVVH  He had MRI Brain so request review.  As per team, patient had an abrupt change while they were talking to him.  As per Dr. Torrez, patient was moving his right arm to command on Friday but had left gaze deviation.    MEDICATIONS  (STANDING):  aspirin  chewable 81 milliGRAM(s) Oral daily  atorvastatin 80 milliGRAM(s) Oral at bedtime  chlorhexidine 0.12% Liquid 15 milliLiter(s) Swish and Spit two times a day  chlorhexidine 2% Cloths 1 Application(s) Topical daily  ergocalciferol Drops 22992 Unit(s) Oral <User Schedule>  escitalopram 5 milliGRAM(s) Oral daily  folic acid 1 milliGRAM(s) Oral daily  hydrocortisone sodium succinate Injectable 50 milliGRAM(s) IV Push every 6 hours  insulin glargine Injectable (LANTUS) 10 Unit(s) SubCutaneous at bedtime  insulin lispro (HumaLOG) corrective regimen sliding scale   SubCutaneous every 6 hours  multivitamin 1 Tablet(s) Oral daily  norepinephrine Infusion 0.01 MICROgram(s)/kG/Min (1.5 mL/Hr) IV Continuous <Continuous>  pantoprazole  Injectable 40 milliGRAM(s) IV Push daily  PureFlow Dialysate RFP-400 (K 2 / Ca 3) 5000 milliLiter(s) (1500 mL/Hr) CRRT <Continuous>  sodium chloride 0.9% lock flush 20 milliLiter(s) IV Push once  thiamine 100 milliGRAM(s) Oral daily  vasopressin Infusion 0.04 Unit(s)/Min (2.4 mL/Hr) IV Continuous <Continuous>    MEDICATIONS  (PRN):  acetaminophen    Suspension. 650 milliGRAM(s) Oral every 6 hours PRN Moderate Pain (4 - 6)  dextrose Gel 1 Dose(s) Oral once PRN Blood Glucose LESS THAN 70 milliGRAM(s)/deciliter  glucagon  Injectable 1 milliGRAM(s) IntraMuscular once PRN Glucose LESS THAN 70 milligrams/deciliter  sodium chloride 0.9% lock flush 10 milliLiter(s) IV Push every 1 hour PRN After each medication administration  sodium chloride 0.9% lock flush 10 milliLiter(s) IV Push every 12 hours PRN Lumen of catheter NOT used    Allergies  Allergy Status Unknown    Exam:  ICU Vital Signs Last 24 Hrs  T(C): 36.9 (26 Mar 2018 16:00), Max: 36.9 (26 Mar 2018 16:00)  T(F): 98.5 (26 Mar 2018 16:00), Max: 98.5 (26 Mar 2018 16:00)  HR: 80 (26 Mar 2018 17:07) (72 - 98)  BP: 103/82 (26 Mar 2018 15:00) (77/63 - 103/82)  BP(mean): 76 (26 Mar 2018 09:00) (68 - 76)  ABP: 102/66 (26 Mar 2018 17:00) (90/64 - 158/124)  ABP(mean): 76 (26 Mar 2018 17:00) (72 - 136)  RR: 14 (26 Mar 2018 17:00) (13 - 18)  SpO2: 100% (26 Mar 2018 17:07) (96% - 100%)      Gen: Lying in bed, intubated, EEG being replaced  Neuro:  Mental status: off sedation since 4:20 PM, slight opening of his eyes to voice, no other response, not following commands  Cranial nerves: eyes deviated to right without oculocephalic reflex, pupils equally round and reactive to light, + corneals, hard to assess facial due to ETT    Motor:  no spontaneous movement   Sensation: no response to pain   Reflexes: absent Babinski bilaterally  Gait: deferred    Labs:                        8.6    10.0  )-----------( 111      ( 26 Mar 2018 11:50 )             27.1   03-26    134<L>  |  98  |  33<H>  ----------------------------<  349<H>  3.8   |  22  |  1.77<H>    Ca    8.5      26 Mar 2018 11:50  Phos  2.1     03-26  Mg     2.1     03-26    TPro  6.5  /  Alb  3.3  /  TBili  1.9<H>  /  DBili  0.7<H>  /  AST  27  /  ALT  27  /  AlkPhos  234<H>  03-26    PT/INR - ( 26 Mar 2018 05:45 )   PT: 25.0 sec;   INR: 2.22     PTT - ( 26 Mar 2018 05:45 )  PTT:45.0 sec    Hemoglobin A1C, Whole Blood: 8.6 % (03-10 @ 23:31)    Radiology:  MR Head No Cont (03.26.18 @ 15:16) >  Small left frontal cortical subacute to chronic infarction. A few subcentimeter left parietal cortical chronic infarctions. Small left   temporal cortical chronic infarction Small to moderate bilateral occipital chronic infarcts.  Mild microvascular disease.  No evidence of   acute infarction. Limited visualization of the left vertebral artery flow void.    - My read - very small area of acute infarct in left occipital lobe near area of chronic infarct.  No hemorrhage.    Assessment: 63 year-old male with PMH CHF, s/p AICD vs PPM, ?Afib, HTN, DM2 on insulin, CKD, and gout presented with septic shock 2/2 cellulitis c/b cardiogenic shock, became unresponsive after A-line placement.  Reviewed MRI Brain and doubt that small area of infarct could cause such acute change in his mental status. He's also been therapeutic on coumadin so lower suspicion for stroke.    Discussed vEEG from weekend and more recently with Dr. Cerda - burst suppression over the weekend probably due to being on Propofol.  vEEG more active now that Propofol has been stopped.    - I also witnessed him moving his right arm spontaneously but unclear if purposefully.     Plan:  1) Monitor mental status off sedation to assess if he's moving more  2) Will discuss with Dr. Torrez what his impression as related to exam this past Friday.  3) Can consider LP for general workup but have concern about holding coumadin.

## 2018-03-26 NOTE — PROGRESS NOTE ADULT - PROBLEM SELECTOR PLAN 1
- oliguric GILMER from ATN from septic shock   - On CVVHD - 75 ml/h - we will continue wit that, we will decreased the blood flow to 150 ml/min  - according to the chart 500 ml positive   - volume status acceptable   - in and outs   - daily weight   - electrolytes noted- potassium and phosphate - on the low side - replete with kPo4 - 30 mmol/l and follow up

## 2018-03-26 NOTE — PROGRESS NOTE ADULT - SUBJECTIVE AND OBJECTIVE BOX
Electrophysiology Device Interrogation       Indication: patient for MRI    Device model: 	Biotronik Iperia (lead also MRI compatile)			                            Functioning Mode: 	VVI 40	  Underlying Rhythm:  NSR  Pacemaker dependency: NO,  <1%  Battery status: WALKER  Interrogating parameters:   				RA			RV			   Sense:                                       0.4                             8.4  Threshold:                                                                   0.6V@0.4ms                                                 Pacing Impedance:                                                     433                                                    Shock Impedance:   55                                                                                                                  Events/Alert:  none    Parameter change:  All therapy and pacing turned off in MRI mode for MRI - when over MRI mode turned off / therapy on.  Device check post MRI with stable numbers

## 2018-03-26 NOTE — PROVIDER CONTACT NOTE (OTHER) - SITUATION
patient bladder volumed scanned and noted 350ml on bladder scan however no urine received on straight cath

## 2018-03-26 NOTE — PROGRESS NOTE ADULT - ASSESSMENT
63M PMH HFrEF 10-15% (ischemic), MI, s/p AICD vs PPM, possible Afib, HTN, DM2 on insulin, possible CKD, and gout, who presents with a chief complaint of generalized weakness s/p septic shock likely 2/2 LE cellulitis. Now with AMS and new leukocytisis likely 2/2 UTI     NEURO  #AMS  - Pt non responsive 4 days ago. VItal signs normal. Pt intubated for airway protection. Stroke code called. CT, CTA negative. VEEG in place- moderate slowing but no seizure activity.   - f/u MRI to r/o CVA   -  TTE with Affinity shows no clot.     #   ID- septic shock 2/2 Cellulitis   -Shock initially resolved however pt persistently hypoglycemic, hypothermic with AMS.   - Restarted on Levophed and Vasopressin given low BP. Will try to wean off Levophed.   - c/w steroids 50 q6h.   -.Re-started zosyn yesterdayfor suspected UTI,(3/25-)  s/p Zosyn (3/11-2/21). Was previously also on Vanco (3/11-3/17).   - RUQ dopper shows no portal vein thrombosis   - TTE with no vegetations. Pt not stable for RUKHSANA.   - Gallium scan read shows LLE cellulitis. ,     - repeat LE CT does not show abscess or gas.   - R lung Effusion likely from overload. s/p thoracentesis with improvement in resp status   -  HIV negative  - Vascular surgery on board. Recommending Amputation as an option if pt continues to be unstable.     #UTI- Urine cloudy appearing with increase WBC.  - f/u UA/UC  - re-started zosyn 3/25    CARDIOVASCULAR   # HYPOTENSION-   - c/w Levophed and Vasopressin drip. Will maintain SBP>90. Will discuss starting Inotropes with cardiology   -  Will continue CVVHD to help reduce CVP   - pending transfer to CCU  - Gallium scan with only LLE cellulitis   - Lactate downtrended to WNL   - CHF with severely reduced EF 15%, caution with fluids. Per renal recs, can give IV albumin for fluids.    # CHF exacerbation  - CHF with EF 10-15% per pt 2/2 ischemic cardiomyopathy s/p AICD as indicated by CXR   - Elevated BNP on admission with R sided effusion and edema  - Cardiac shock causing hypotension   - Started on vasopressin per CHF team recs. Added Levophed given persistently low pressures.   - Persistent pulm congestion noted on chest Xray. c/w CVVHD  - Give fluids judiciously given low EF in setting of likely sepsis  - Unclear medical regimen opt. Per [facility] ( and [facility]) pt has not picked up Torsemide 20 or Metoprolol 100 since November.   - Hold ACE/Metoprolol in setting of sepsis  - CVO saturation improved after transfusion    #AFIB  - PT with hx of Afib and LV thrombus on coumadin.    -d/c  Hep drip given concern of HIT.d/c Argatroban.  - Dose coumadin daily. OG tube placed.    - Pt s/p FFP (3/13,3/14) and Vit K (3/13) for thoracocentesis and HD catheter placement.   -TTE with affinity shows no  LV thrombus        #CAD- Hx of MI s/p AICD  - Pt with pLAD in 7/2017, was started on Aspirin and Brelinta per records.  - Will start on asp 81, Atorvastatin 80   - c/w Coumadin     # LE Edema   - LE edema with chronic venous stasis.   - Duplex does not show DVT    PULMONARY   Intubated  - Intubated on 3/22 for airway protection   - Will attempt CPAP trial and extubation     #Acute resp failure- Resolved   - Intubated for airway protection   - pt with R loculated plural effusion noted to have increased work of breathing. Likely 2/2 fluid overload, Fluid studies consistent with exudative effusion.   - s/p thoracentesis on 3/13 with R chest tube placement. Chest tube removed 3/15.   - s/p  pRBC transfusion on 3/12. 3/16  - Monitor resp status  - Appreciate CHF team recs regarding fluid status. Continue dialysis for fluid removal      #RENAL   #ARF- 2/2 ischemic ATN  -Unknown baseline Cr presenting with Cr 3.93 with metabolic derangements.   - Likely 2/2 Sepsis, low intravascular volume and CHF  - Trend BUN and Cr.  - Renal team on board, pt with R HD catheter placed by IR on 3/21. c/w CVVHD currently removing 75cc/hour   - CT abdomen and pelvis without acute obstruction.   - retroperitoneal ultrasound showing medical renal disease.     #Hyperkalemia   -Resolved   - PT with elevated K to 5.7 on admission,  no EKG changes   - Continue telemetry monitoring  - Trend K   - Can given Kayexalate if persists   - c/w dialysis     #Metabolic acidosis   - 2/2 Lactate, starvation ketoacidosis and uremia   - resolved       #GI   - pt wit Elevated Bilirubin and Alk phos. Abd exam benign  - CT abdomen/pelvis consistent with cholelithiasis and ascites.     #HEME    #Thrombocytopenia  - ELVIRA negative but PF4 was positive. Unlikely HIT.    - Could have be related to Vanc/zosyn.   - Improving, monitor now that back on zosyn     # elevated INR. Unclear etiology. Pt on coumadin?   - Continue with daily coumadin dosing   - Given Vit K and FFP3/12. FFP 3/13   - Monitor coags    #Anemia  - Stable at 8-9. Anemia of chronic disease.  -s/p 1pRBC on 3/12, 3/16   - WIll keep Hb around 9-10     #ENDOCRINE   - S/p insulin drip  - incresed Lantus to 10U since pt now hyperglycemic with stress dose steroids.    Monitor blood glucose and ISS coverage. pt with episodes of hypoglycemia.   - Monitor blood glucose   - A1C 8.6%      F: No IVF   E: Replete K<4 Mg<2, caution in setting of acute renal failure  N: OG tube placed, feeds at goal     Lines:  ETT (3/22), OG tube (3/21)R HD catheter (3/21). L IJ 3/21, PICC line 3/20    Full code   Dispo: MICU  Ppx: Coumadin

## 2018-03-26 NOTE — PROGRESS NOTE ADULT - ATTENDING COMMENTS
Tolerating 75/hr off with CVPs 12-16 on stable double pressor requirements with about 1.6L net negative each day, will continue this steady fluid removal for decomp. HF and acute cardiorenal syndrome vs ischemic ATN from cardiogenic/septic shock. Hypophosphatemic, decrease BFR to 150/hr, was tolerating this last week without clotting issues.

## 2018-03-26 NOTE — PROGRESS NOTE ADULT - SUBJECTIVE AND OBJECTIVE BOX
the patient seen in the morning - intubated, on pressor support, still on CVVHD no issues, UF - 75 ml/h, no urine output according to the nurse.   Going today for CT angio without contrast     Renal service follows for the need of fluid management in the setting of CHF, GILMER - oliguria     Patient seen and examined at bedside.     acetaminophen    Suspension. 650 milliGRAM(s) every 6 hours PRN  aspirin  chewable 81 milliGRAM(s) daily  atorvastatin 80 milliGRAM(s) at bedtime  chlorhexidine 0.12% Liquid 15 milliLiter(s) two times a day  chlorhexidine 2% Cloths 1 Application(s) daily  dextrose 5%. 1000 milliLiter(s) <Continuous>  dextrose 50% Injectable 12.5 Gram(s) once  dextrose 50% Injectable 25 Gram(s) once  dextrose 50% Injectable 25 Gram(s) once  dextrose Gel 1 Dose(s) once PRN  escitalopram 5 milliGRAM(s) daily  folic acid 1 milliGRAM(s) daily  glucagon  Injectable 1 milliGRAM(s) once PRN  hydrocortisone sodium succinate Injectable 50 milliGRAM(s) every 6 hours  insulin glargine Injectable (LANTUS) 10 Unit(s) at bedtime  insulin lispro (HumaLOG) corrective regimen sliding scale   every 6 hours  insulin NPH human recombinant 6 Unit(s) once  multivitamin 1 Tablet(s) daily  norepinephrine Infusion 0.01 MICROgram(s)/kG/Min <Continuous>  pantoprazole  Injectable 40 milliGRAM(s) daily  piperacillin/tazobactam IVPB.     piperacillin/tazobactam IVPB. 2.25 Gram(s) every 6 hours  potassium phosphate IVPB 30 milliMole(s) once  propofol Infusion 0.01 MICROgram(s)/kG/Min <Continuous>  PureFlow Dialysate RFP-400 (K 2 / Ca 3) 5000 milliLiter(s) <Continuous>  sodium chloride 0.9% lock flush 10 milliLiter(s) every 1 hour PRN  sodium chloride 0.9% lock flush 10 milliLiter(s) every 12 hours PRN  sodium chloride 0.9% lock flush 20 milliLiter(s) once  thiamine 100 milliGRAM(s) daily  vasopressin Infusion 0.04 Unit(s)/Min <Continuous>      Allergies    Allergy Status Unknown    Intolerances        T(C): , Max: 36.2 (03-25-18 @ 19:00)  T(F): , Max: 97.1 (03-25-18 @ 19:00)  HR: 82 (03-26-18 @ 13:00)  BP: 85/71 (03-26-18 @ 09:00)  BP(mean): 76 (03-26-18 @ 09:00)  RR: 14 (03-26-18 @ 13:00)  SpO2: 100% (03-26-18 @ 13:00)  Wt(kg): --    03-25 @ 07:01  -  03-26 @ 07:00  --------------------------------------------------------  IN:    Enteral Tube Flush: 120 mL    Nepro with Carb Steady: 1320 mL    norepinephrine Infusion: 126 mL    Oral Fluid: 80 mL    propofol Infusion: 108 mL    Solution: 300 mL    Solution: 200 mL    vasopressin Infusion: 55.2 mL  Total IN: 2309.2 mL    OUT:    Intermittent Catheterization - Urethral: 100 mL    Other: 73 mL    Other: 1652 mL  Total OUT: 1825 mL    Total NET: 484.2 mL      03-26 @ 07:01  -  03-26 @ 14:42  --------------------------------------------------------  IN:    Enteral Tube Flush: 70 mL    Nepro with Carb Steady: 275 mL    norepinephrine Infusion: 30 mL    Solution: 100 mL    Solution: 35 mL    vasopressin Infusion: 2.4 mL    vasopressin Infusion: 9.6 mL  Total IN: 522 mL    OUT:    Other: 435 mL  Total OUT: 435 mL    Total NET: 87 mL        Physical exam:   Intubated and sedated   No JVD   Normal air entry into the lungs, no wheezing, no crackles   RRR, normal s1, s2, no murmurs, rubs or gallops   Abdomen - soft, not tender, not distended   Extremities: no edema   HAs a HD catheter on the right of his neck        LABS:                        8.6    10.0  )-----------( 111      ( 26 Mar 2018 11:50 )             27.1     03-26    134<L>  |  98  |  33<H>  ----------------------------<  349<H>  3.8   |  22  |  1.77<H>    Ca    8.5      26 Mar 2018 11:50  Phos  2.1     03-26  Mg     2.1     03-26    TPro  6.5  /  Alb  3.3  /  TBili  1.9<H>  /  DBili  0.7<H>  /  AST  27  /  ALT  27  /  AlkPhos  234<H>  03-26      PT/INR - ( 26 Mar 2018 05:45 )   PT: 25.0 sec;   INR: 2.22          PTT - ( 26 Mar 2018 05:45 )  PTT:45.0 sec          RADIOLOGY & ADDITIONAL STUDIES:

## 2018-03-26 NOTE — PROGRESS NOTE ADULT - ASSESSMENT
62 yo M history of HFrEF 10-15% 2/2 ischemic cardiomyopathy, MI , s/p AICD vs PPM, ?Afib, Hypertension, Diabetes Mellitus Type 2 on insulin, CKD?and gout, who presents with a chief complaint of generalized weakness. Now shakira - atn on CVVHD for managing the fluid status - we will continue today with CVVHD - 75 ml/h, on pressor support, 24 hours neutral,please consider decreasing the input of fluids

## 2018-03-26 NOTE — PROGRESS NOTE ADULT - ATTENDING COMMENTS
Pt remains critically ill, intubated on mechanical vent.   Very limited neuro exam, pt not responsive to command but responds to pain. MRI with chronic and subacute strokes, but nothing acute to explain his sudden decompensation. Appears clinically and hemodynamically stable from infectious and cardiorespiratory standpoint. EEG read pending but no evidence of subclinical seizure at the moment. Will contninue to provide supportive care and discuss case with Neuro team.

## 2018-03-26 NOTE — PROGRESS NOTE ADULT - SUBJECTIVE AND OBJECTIVE BOX
INTERVAL HPI/OVERNIGHT EVENTS: New leukocytisis with cloudy urine, started Zosyn. tidal volume increased to 500    SUBJECTIVE: Patient seen and examined at bedside. Pt does not respnd to verbal stimuli.     OBJECTIVE:    VITAL SIGNS:  ICU Vital Signs Last 24 Hrs  T(C): 36.1 (26 Mar 2018 10:00), Max: 36.2 (25 Mar 2018 19:00)  T(F): 97 (26 Mar 2018 10:00), Max: 97.1 (25 Mar 2018 19:00)  HR: 82 (26 Mar 2018 13:00) (72 - 84)  BP: 85/71 (26 Mar 2018 09:00) (77/63 - 85/71)  BP(mean): 76 (26 Mar 2018 09:00) (68 - 76)  ABP: 94/62 (26 Mar 2018 13:00) (84/60 - 108/78)  ABP(mean): 72 (26 Mar 2018 13:00) (70 - 90)  RR: 14 (26 Mar 2018 13:00) (13 - 17)  SpO2: 100% (26 Mar 2018 13:00) (96% - 100%)    Mode: AC/ CMV (Assist Control/ Continuous Mandatory Ventilation), RR (machine): 14, TV (machine): 500, FiO2: 40, PEEP: 5, ITime: 0.9, MAP: 7.7, PIP: 18    03-25 @ 07:01 - 03-26 @ 07:00  --------------------------------------------------------  IN: 2309.2 mL / OUT: 1825 mL / NET: 484.2 mL    03-26 @ 07:01 - 03-26 @ 14:54  --------------------------------------------------------  IN: 522 mL / OUT: 435 mL / NET: 87 mL      CAPILLARY BLOOD GLUCOSE      POCT Blood Glucose.: 332 mg/dL (26 Mar 2018 11:13)      PHYSICAL EXAM:    General: Intubated. Does not respond to verbal or painful stimuli  HEENT: NC/AT; PERRL, Upward gaze, no tracking.   Neck: supple, R HD cath, L IJ  Respiratory: cta bl   Cardiovascular: +S1/S2; RRR, ii/vi systolic murmur   Abdomen: soft, NT/ND; +BS x4  : Decreased testicular edema   Extremities:  2+ LE edema. b/l venous stasis changes. cool to touch   Vascular: WWP, 2+ peripheral pulses b/l;  Skin: normal color and turgor; no rash  Neuro: Does not respond to verbal or painful stimuli      MEDICATIONS:  MEDICATIONS  (STANDING):  aspirin  chewable 81 milliGRAM(s) Oral daily  atorvastatin 80 milliGRAM(s) Oral at bedtime  chlorhexidine 0.12% Liquid 15 milliLiter(s) Swish and Spit two times a day  chlorhexidine 2% Cloths 1 Application(s) Topical daily  dextrose 5%. 1000 milliLiter(s) (50 mL/Hr) IV Continuous <Continuous>  dextrose 50% Injectable 12.5 Gram(s) IV Push once  dextrose 50% Injectable 25 Gram(s) IV Push once  dextrose 50% Injectable 25 Gram(s) IV Push once  escitalopram 5 milliGRAM(s) Oral daily  folic acid 1 milliGRAM(s) Oral daily  hydrocortisone sodium succinate Injectable 50 milliGRAM(s) IV Push every 6 hours  insulin glargine Injectable (LANTUS) 10 Unit(s) SubCutaneous at bedtime  insulin lispro (HumaLOG) corrective regimen sliding scale   SubCutaneous every 6 hours  insulin NPH human recombinant 6 Unit(s) SubCutaneous once  multivitamin 1 Tablet(s) Oral daily  norepinephrine Infusion 0.01 MICROgram(s)/kG/Min (1.5 mL/Hr) IV Continuous <Continuous>  pantoprazole  Injectable 40 milliGRAM(s) IV Push daily  piperacillin/tazobactam IVPB.      piperacillin/tazobactam IVPB. 2.25 Gram(s) IV Intermittent every 6 hours  potassium phosphate IVPB 30 milliMole(s) IV Intermittent once  propofol Infusion 0.01 MICROgram(s)/kG/Min (0.005 mL/Hr) IV Continuous <Continuous>  PureFlow Dialysate RFP-400 (K 2 / Ca 3) 5000 milliLiter(s) (1500 mL/Hr) CRRT <Continuous>  sodium chloride 0.9% lock flush 20 milliLiter(s) IV Push once  thiamine 100 milliGRAM(s) Oral daily  vasopressin Infusion 0.04 Unit(s)/Min (2.4 mL/Hr) IV Continuous <Continuous>    MEDICATIONS  (PRN):  acetaminophen    Suspension. 650 milliGRAM(s) Oral every 6 hours PRN Moderate Pain (4 - 6)  dextrose Gel 1 Dose(s) Oral once PRN Blood Glucose LESS THAN 70 milliGRAM(s)/deciliter  glucagon  Injectable 1 milliGRAM(s) IntraMuscular once PRN Glucose LESS THAN 70 milligrams/deciliter  sodium chloride 0.9% lock flush 10 milliLiter(s) IV Push every 1 hour PRN After each medication administration  sodium chloride 0.9% lock flush 10 milliLiter(s) IV Push every 12 hours PRN Lumen of catheter NOT used      ALLERGIES:  Allergies    Allergy Status Unknown    Intolerances        LABS:                        8.6    10.0  )-----------( 111      ( 26 Mar 2018 11:50 )             27.1     03-26    134<L>  |  98  |  33<H>  ----------------------------<  349<H>  3.8   |  22  |  1.77<H>    Ca    8.5      26 Mar 2018 11:50  Phos  2.1     03-26  Mg     2.1     03-26    TPro  6.5  /  Alb  3.3  /  TBili  1.9<H>  /  DBili  0.7<H>  /  AST  27  /  ALT  27  /  AlkPhos  234<H>  03-26    PT/INR - ( 26 Mar 2018 05:45 )   PT: 25.0 sec;   INR: 2.22          PTT - ( 26 Mar 2018 05:45 )  PTT:45.0 sec      RADIOLOGY & ADDITIONAL TESTS: Reviewed.

## 2018-03-27 LAB
ALBUMIN SERPL ELPH-MCNC: 3.2 G/DL — LOW (ref 3.3–5)
ALP SERPL-CCNC: 225 U/L — HIGH (ref 40–120)
ALT FLD-CCNC: 27 U/L — SIGNIFICANT CHANGE UP (ref 10–45)
ANION GAP SERPL CALC-SCNC: 11 MMOL/L — SIGNIFICANT CHANGE UP (ref 5–17)
ANION GAP SERPL CALC-SCNC: 13 MMOL/L — SIGNIFICANT CHANGE UP (ref 5–17)
ANION GAP SERPL CALC-SCNC: 13 MMOL/L — SIGNIFICANT CHANGE UP (ref 5–17)
ANION GAP SERPL CALC-SCNC: 14 MMOL/L — SIGNIFICANT CHANGE UP (ref 5–17)
APTT BLD: 48.9 SEC — HIGH (ref 27.5–37.4)
AST SERPL-CCNC: 23 U/L — SIGNIFICANT CHANGE UP (ref 10–40)
BASE EXCESS BLDA CALC-SCNC: 0.7 MMOL/L — SIGNIFICANT CHANGE UP (ref -2–3)
BILIRUB SERPL-MCNC: 1.4 MG/DL — HIGH (ref 0.2–1.2)
BUN SERPL-MCNC: 31 MG/DL — HIGH (ref 7–23)
BUN SERPL-MCNC: 34 MG/DL — HIGH (ref 7–23)
BUN SERPL-MCNC: 35 MG/DL — HIGH (ref 7–23)
BUN SERPL-MCNC: 38 MG/DL — HIGH (ref 7–23)
CALCIUM SERPL-MCNC: 8.2 MG/DL — LOW (ref 8.4–10.5)
CALCIUM SERPL-MCNC: 8.4 MG/DL — SIGNIFICANT CHANGE UP (ref 8.4–10.5)
CALCIUM SERPL-MCNC: 8.5 MG/DL — SIGNIFICANT CHANGE UP (ref 8.4–10.5)
CALCIUM SERPL-MCNC: 8.6 MG/DL — SIGNIFICANT CHANGE UP (ref 8.4–10.5)
CHLORIDE SERPL-SCNC: 100 MMOL/L — SIGNIFICANT CHANGE UP (ref 96–108)
CHLORIDE SERPL-SCNC: 101 MMOL/L — SIGNIFICANT CHANGE UP (ref 96–108)
CHLORIDE SERPL-SCNC: 99 MMOL/L — SIGNIFICANT CHANGE UP (ref 96–108)
CHLORIDE SERPL-SCNC: 99 MMOL/L — SIGNIFICANT CHANGE UP (ref 96–108)
CO2 SERPL-SCNC: 22 MMOL/L — SIGNIFICANT CHANGE UP (ref 22–31)
CO2 SERPL-SCNC: 23 MMOL/L — SIGNIFICANT CHANGE UP (ref 22–31)
CO2 SERPL-SCNC: 23 MMOL/L — SIGNIFICANT CHANGE UP (ref 22–31)
CO2 SERPL-SCNC: 25 MMOL/L — SIGNIFICANT CHANGE UP (ref 22–31)
CREAT SERPL-MCNC: 1.79 MG/DL — HIGH (ref 0.5–1.3)
CREAT SERPL-MCNC: 1.85 MG/DL — HIGH (ref 0.5–1.3)
CREAT SERPL-MCNC: 1.91 MG/DL — HIGH (ref 0.5–1.3)
CREAT SERPL-MCNC: 2.16 MG/DL — HIGH (ref 0.5–1.3)
GAS PNL BLDA: SIGNIFICANT CHANGE UP
GLUCOSE BLDC GLUCOMTR-MCNC: 198 MG/DL — HIGH (ref 70–99)
GLUCOSE BLDC GLUCOMTR-MCNC: 219 MG/DL — HIGH (ref 70–99)
GLUCOSE BLDC GLUCOMTR-MCNC: 245 MG/DL — HIGH (ref 70–99)
GLUCOSE BLDC GLUCOMTR-MCNC: 252 MG/DL — HIGH (ref 70–99)
GLUCOSE SERPL-MCNC: 255 MG/DL — HIGH (ref 70–99)
GLUCOSE SERPL-MCNC: 262 MG/DL — HIGH (ref 70–99)
GLUCOSE SERPL-MCNC: 303 MG/DL — HIGH (ref 70–99)
GLUCOSE SERPL-MCNC: 306 MG/DL — HIGH (ref 70–99)
HAPTOGLOB SERPL-MCNC: 46 MG/DL — SIGNIFICANT CHANGE UP (ref 34–200)
HCO3 BLDA-SCNC: 25 MMOL/L — SIGNIFICANT CHANGE UP (ref 21–28)
HCT VFR BLD CALC: 25.3 % — LOW (ref 39–50)
HCT VFR BLD CALC: 26.3 % — LOW (ref 39–50)
HCT VFR BLD CALC: 27.1 % — LOW (ref 39–50)
HCT VFR BLD CALC: 27.5 % — LOW (ref 39–50)
HGB BLD-MCNC: 7.8 G/DL — LOW (ref 13–17)
HGB BLD-MCNC: 8.5 G/DL — LOW (ref 13–17)
HGB BLD-MCNC: 8.5 G/DL — LOW (ref 13–17)
HGB BLD-MCNC: 8.6 G/DL — LOW (ref 13–17)
INR BLD: 2.86 — HIGH (ref 0.88–1.16)
LACTATE SERPL-SCNC: 1.7 MMOL/L — SIGNIFICANT CHANGE UP (ref 0.5–2)
LDH SERPL L TO P-CCNC: 256 U/L — HIGH (ref 50–242)
MAGNESIUM SERPL-MCNC: 1.8 MG/DL — SIGNIFICANT CHANGE UP (ref 1.6–2.6)
MCHC RBC-ENTMCNC: 30.4 PG — SIGNIFICANT CHANGE UP (ref 27–34)
MCHC RBC-ENTMCNC: 30.8 G/DL — LOW (ref 32–36)
MCHC RBC-ENTMCNC: 31 PG — SIGNIFICANT CHANGE UP (ref 27–34)
MCHC RBC-ENTMCNC: 31.3 G/DL — LOW (ref 32–36)
MCHC RBC-ENTMCNC: 31.3 PG — SIGNIFICANT CHANGE UP (ref 27–34)
MCHC RBC-ENTMCNC: 31.4 G/DL — LOW (ref 32–36)
MCHC RBC-ENTMCNC: 32.2 PG — SIGNIFICANT CHANGE UP (ref 27–34)
MCHC RBC-ENTMCNC: 32.3 G/DL — SIGNIFICANT CHANGE UP (ref 32–36)
MCV RBC AUTO: 98.4 FL — SIGNIFICANT CHANGE UP (ref 80–100)
MCV RBC AUTO: 99.3 FL — SIGNIFICANT CHANGE UP (ref 80–100)
MCV RBC AUTO: 99.6 FL — SIGNIFICANT CHANGE UP (ref 80–100)
MCV RBC AUTO: 99.6 FL — SIGNIFICANT CHANGE UP (ref 80–100)
OSMOLALITY SERPL: 299 MOSM/KG — SIGNIFICANT CHANGE UP (ref 280–301)
PCO2 BLDA: 37 MMHG — SIGNIFICANT CHANGE UP (ref 35–48)
PH BLDA: 7.44 — SIGNIFICANT CHANGE UP (ref 7.35–7.45)
PHOSPHATE SERPL-MCNC: 2.4 MG/DL — LOW (ref 2.5–4.5)
PHOSPHATE SERPL-MCNC: 2.6 MG/DL — SIGNIFICANT CHANGE UP (ref 2.5–4.5)
PHOSPHATE SERPL-MCNC: 3.2 MG/DL — SIGNIFICANT CHANGE UP (ref 2.5–4.5)
PHOSPHATE SERPL-MCNC: 4.2 MG/DL — SIGNIFICANT CHANGE UP (ref 2.5–4.5)
PLATELET # BLD AUTO: 71 K/UL — LOW (ref 150–400)
PLATELET # BLD AUTO: 86 K/UL — LOW (ref 150–400)
PLATELET # BLD AUTO: 87 K/UL — LOW (ref 150–400)
PLATELET # BLD AUTO: 96 K/UL — LOW (ref 150–400)
PO2 BLDA: 192 MMHG — HIGH (ref 83–108)
POTASSIUM SERPL-MCNC: 3.5 MMOL/L — SIGNIFICANT CHANGE UP (ref 3.5–5.3)
POTASSIUM SERPL-MCNC: 3.7 MMOL/L — SIGNIFICANT CHANGE UP (ref 3.5–5.3)
POTASSIUM SERPL-MCNC: 3.7 MMOL/L — SIGNIFICANT CHANGE UP (ref 3.5–5.3)
POTASSIUM SERPL-MCNC: 3.8 MMOL/L — SIGNIFICANT CHANGE UP (ref 3.5–5.3)
POTASSIUM SERPL-SCNC: 3.5 MMOL/L — SIGNIFICANT CHANGE UP (ref 3.5–5.3)
POTASSIUM SERPL-SCNC: 3.7 MMOL/L — SIGNIFICANT CHANGE UP (ref 3.5–5.3)
POTASSIUM SERPL-SCNC: 3.7 MMOL/L — SIGNIFICANT CHANGE UP (ref 3.5–5.3)
POTASSIUM SERPL-SCNC: 3.8 MMOL/L — SIGNIFICANT CHANGE UP (ref 3.5–5.3)
PROT SERPL-MCNC: 5.8 G/DL — LOW (ref 6–8.3)
PROTHROM AB SERPL-ACNC: 32.4 SEC — HIGH (ref 9.8–12.7)
RBC # BLD: 2.57 M/UL — LOW (ref 4.2–5.8)
RBC # BLD: 2.64 M/UL — LOW (ref 4.2–5.8)
RBC # BLD: 2.72 M/UL — LOW (ref 4.2–5.8)
RBC # BLD: 2.77 M/UL — LOW (ref 4.2–5.8)
RBC # FLD: 19.7 % — HIGH (ref 10.3–16.9)
RBC # FLD: 20.2 % — HIGH (ref 10.3–16.9)
RBC # FLD: 20.5 % — HIGH (ref 10.3–16.9)
RBC # FLD: 20.6 % — HIGH (ref 10.3–16.9)
SAO2 % BLDA: 100 % — SIGNIFICANT CHANGE UP (ref 95–100)
SODIUM SERPL-SCNC: 135 MMOL/L — SIGNIFICANT CHANGE UP (ref 135–145)
SODIUM SERPL-SCNC: 135 MMOL/L — SIGNIFICANT CHANGE UP (ref 135–145)
SODIUM SERPL-SCNC: 136 MMOL/L — SIGNIFICANT CHANGE UP (ref 135–145)
SODIUM SERPL-SCNC: 137 MMOL/L — SIGNIFICANT CHANGE UP (ref 135–145)
WBC # BLD: 11.6 K/UL — HIGH (ref 3.8–10.5)
WBC # BLD: 12.2 K/UL — HIGH (ref 3.8–10.5)
WBC # BLD: 14.5 K/UL — HIGH (ref 3.8–10.5)
WBC # BLD: 8.7 K/UL — SIGNIFICANT CHANGE UP (ref 3.8–10.5)
WBC # FLD AUTO: 11.6 K/UL — HIGH (ref 3.8–10.5)
WBC # FLD AUTO: 12.2 K/UL — HIGH (ref 3.8–10.5)
WBC # FLD AUTO: 14.5 K/UL — HIGH (ref 3.8–10.5)
WBC # FLD AUTO: 8.7 K/UL — SIGNIFICANT CHANGE UP (ref 3.8–10.5)

## 2018-03-27 PROCEDURE — 71045 X-RAY EXAM CHEST 1 VIEW: CPT | Mod: 26

## 2018-03-27 PROCEDURE — 99291 CRITICAL CARE FIRST HOUR: CPT

## 2018-03-27 PROCEDURE — 99233 SBSQ HOSP IP/OBS HIGH 50: CPT

## 2018-03-27 PROCEDURE — 95812 EEG 41-60 MINUTES: CPT | Mod: 26

## 2018-03-27 PROCEDURE — 90945 DIALYSIS ONE EVALUATION: CPT

## 2018-03-27 RX ORDER — POTASSIUM PHOSPHATE, MONOBASIC POTASSIUM PHOSPHATE, DIBASIC 236; 224 MG/ML; MG/ML
30 INJECTION, SOLUTION INTRAVENOUS ONCE
Qty: 0 | Refills: 0 | Status: COMPLETED | OUTPATIENT
Start: 2018-03-27 | End: 2018-03-27

## 2018-03-27 RX ORDER — MAGNESIUM OXIDE 400 MG ORAL TABLET 241.3 MG
400 TABLET ORAL ONCE
Qty: 0 | Refills: 0 | Status: COMPLETED | OUTPATIENT
Start: 2018-03-27 | End: 2018-03-27

## 2018-03-27 RX ORDER — HYDROCORTISONE 20 MG
50 TABLET ORAL EVERY 8 HOURS
Qty: 0 | Refills: 0 | Status: DISCONTINUED | OUTPATIENT
Start: 2018-03-27 | End: 2018-03-29

## 2018-03-27 RX ORDER — MODAFINIL 200 MG/1
100 TABLET ORAL
Qty: 0 | Refills: 0 | Status: DISCONTINUED | OUTPATIENT
Start: 2018-03-27 | End: 2018-04-03

## 2018-03-27 RX ORDER — ERGOCALCIFEROL 1.25 MG/1
6000 CAPSULE ORAL DAILY
Qty: 0 | Refills: 0 | Status: DISCONTINUED | OUTPATIENT
Start: 2018-03-27 | End: 2018-05-22

## 2018-03-27 RX ORDER — WARFARIN SODIUM 2.5 MG/1
2.5 TABLET ORAL ONCE
Qty: 0 | Refills: 0 | Status: COMPLETED | OUTPATIENT
Start: 2018-03-27 | End: 2018-03-27

## 2018-03-27 RX ORDER — NOREPINEPHRINE BITARTRATE/D5W 8 MG/250ML
0.01 PLASTIC BAG, INJECTION (ML) INTRAVENOUS
Qty: 16 | Refills: 0 | Status: DISCONTINUED | OUTPATIENT
Start: 2018-03-27 | End: 2018-03-28

## 2018-03-27 RX ORDER — INSULIN GLARGINE 100 [IU]/ML
24 INJECTION, SOLUTION SUBCUTANEOUS AT BEDTIME
Qty: 0 | Refills: 0 | Status: DISCONTINUED | OUTPATIENT
Start: 2018-03-27 | End: 2018-03-28

## 2018-03-27 RX ORDER — PANTOPRAZOLE SODIUM 20 MG/1
40 TABLET, DELAYED RELEASE ORAL DAILY
Qty: 0 | Refills: 0 | Status: DISCONTINUED | OUTPATIENT
Start: 2018-03-27 | End: 2018-05-30

## 2018-03-27 RX ADMIN — WARFARIN SODIUM 2.5 MILLIGRAM(S): 2.5 TABLET ORAL at 22:05

## 2018-03-27 RX ADMIN — Medication 6: at 12:39

## 2018-03-27 RX ADMIN — PANTOPRAZOLE SODIUM 40 MILLIGRAM(S): 20 TABLET, DELAYED RELEASE ORAL at 12:22

## 2018-03-27 RX ADMIN — MAGNESIUM OXIDE 400 MG ORAL TABLET 400 MILLIGRAM(S): 241.3 TABLET ORAL at 19:13

## 2018-03-27 RX ADMIN — Medication 100 MILLIGRAM(S): at 12:22

## 2018-03-27 RX ADMIN — CHLORHEXIDINE GLUCONATE 15 MILLILITER(S): 213 SOLUTION TOPICAL at 07:08

## 2018-03-27 RX ADMIN — CHLORHEXIDINE GLUCONATE 15 MILLILITER(S): 213 SOLUTION TOPICAL at 22:04

## 2018-03-27 RX ADMIN — CHLORHEXIDINE GLUCONATE 15 MILLILITER(S): 213 SOLUTION TOPICAL at 07:10

## 2018-03-27 RX ADMIN — Medication 0.75 MICROGRAM(S)/KG/MIN: at 15:00

## 2018-03-27 RX ADMIN — INSULIN GLARGINE 24 UNIT(S): 100 INJECTION, SOLUTION SUBCUTANEOUS at 22:05

## 2018-03-27 RX ADMIN — Medication 1 TABLET(S): at 12:22

## 2018-03-27 RX ADMIN — Medication 4: at 22:05

## 2018-03-27 RX ADMIN — Medication 81 MILLIGRAM(S): at 12:22

## 2018-03-27 RX ADMIN — Medication 2: at 18:34

## 2018-03-27 RX ADMIN — POTASSIUM PHOSPHATE, MONOBASIC POTASSIUM PHOSPHATE, DIBASIC 85 MILLIMOLE(S): 236; 224 INJECTION, SOLUTION INTRAVENOUS at 10:33

## 2018-03-27 RX ADMIN — ERGOCALCIFEROL 6000 UNIT(S): 1.25 CAPSULE ORAL at 12:22

## 2018-03-27 RX ADMIN — MODAFINIL 100 MILLIGRAM(S): 200 TABLET ORAL at 16:21

## 2018-03-27 RX ADMIN — CHLORHEXIDINE GLUCONATE 1 APPLICATION(S): 213 SOLUTION TOPICAL at 12:48

## 2018-03-27 RX ADMIN — Medication 50 MILLIGRAM(S): at 19:13

## 2018-03-27 RX ADMIN — Medication 50 MILLIGRAM(S): at 04:48

## 2018-03-27 RX ADMIN — Medication 50 MILLIGRAM(S): at 12:23

## 2018-03-27 RX ADMIN — Medication 4: at 07:09

## 2018-03-27 RX ADMIN — Medication 1 MILLIGRAM(S): at 12:22

## 2018-03-27 RX ADMIN — ATORVASTATIN CALCIUM 80 MILLIGRAM(S): 80 TABLET, FILM COATED ORAL at 22:06

## 2018-03-27 RX ADMIN — ESCITALOPRAM OXALATE 5 MILLIGRAM(S): 10 TABLET, FILM COATED ORAL at 12:23

## 2018-03-27 NOTE — PROGRESS NOTE ADULT - PROBLEM SELECTOR PLAN 1
remains altered without a clear etiology, off sedation, not following commands.  await further input from neuro  Will meet with dgt tomorrow at 3 to discuss plan of care in the setting of prognosis

## 2018-03-27 NOTE — PROGRESS NOTE ADULT - ASSESSMENT
Patient is a 63 year old gentleman with medical history significant for ischemic heart failure (EF 15-20% 3/25 with global LV hypokinesis, s/p AICD), atrial fibrillation, CAD (stent to pLAD), DMII on insulin, HTN, CKD (unknown baseline currently on CVVHD for fluid management) who presented with heart failure in the setting of septic shock 2/2 LLE cellulitis. Patient is s/p antibiotic treatment, now intubated for airway protection in setting of episode of altered mental status, with new UTI. Patient remains in shock requiring levophed for blood pressure support.     -on CVVHD and CVP ~10mmHg with continuous fluid removal  -on levophed for BP support, (prefer vasopressin for decreased constriction of the pulmonary vasculature)  -will need to continue fluid removal and keep BP supported with pressors then can start neurohormonal antagonists when situation stabilizes Patient is a 63 year old gentleman with medical history significant for ischemic heart failure (EF 15-20% 3/25 with global LV hypokinesis, s/p AICD), atrial fibrillation, CAD (stent to pLAD), DMII on insulin, HTN, CKD (unknown baseline currently on CVVHD for fluid management) who presented with heart failure in the setting of septic shock 2/2 LLE cellulitis. Patient is s/p antibiotic treatment, now intubated for airway protection in setting of episode of altered mental status, with new UTI. Patient remains in shock requiring levophed for blood pressure support.     -on CVVHD and CVP ~12mmHg with goal to <10mmHg with continuous fluid removal   -on levophed for BP support, (prefer vasopressin for decreased constriction of the pulmonary vasculature)  -will need to continue fluid removal and keep BP supported with pressors then can start neurohormonal antagonists when situation stabilizes

## 2018-03-27 NOTE — PROGRESS NOTE ADULT - PROBLEM SELECTOR PLAN 7
pt had indicated that he wanted a trial of critical care.  Need to discuss overall goals of care based on pts poor prognosis given multisystem failure.

## 2018-03-27 NOTE — PROGRESS NOTE ADULT - SUBJECTIVE AND OBJECTIVE BOX
OVERNIGHT EVENTS: ELSIE.    SUBJECTIVE / INTERVAL HPI: Patient seen and examined at bedside. Patient intubated and nonresponsive to painful stimuli.     VITAL SIGNS:  Vital Signs Last 24 Hrs  T(C): 36.6 (27 Mar 2018 10:00), Max: 37 (26 Mar 2018 18:00)  T(F): 97.8 (27 Mar 2018 10:00), Max: 98.6 (26 Mar 2018 18:00)  HR: 76 (27 Mar 2018 13:00) (70 - 98)  BP: 103/82 (26 Mar 2018 15:00) (103/82 - 103/82)  BP(mean): --  RR: 14 (27 Mar 2018 13:00) (13 - 18)  SpO2: 100% (27 Mar 2018 13:00) (97% - 100%)    PHYSICAL EXAM:  General: WDWN, laying in bed, in no acute distress on ventilator  HEENT: NC/AT; PERRL, anicteric sclera; MMM  Neck: supple, JVD present  Cardiovascular: +S1/S2; RRR  Respiratory: bibasilar and lateral crackles, on ventilator breathing comfortably  Gastrointestinal: soft, NT/ND; +BSx4 normoactive  Extremities: WWP; clubbing or cyanosis, edema to knees bilaterally  Skin: left ankle wrapped in area of cellulitis, cracked skin over shins bilaterally  Vascular: 1+ radial, DP/PT pulses B/L  Neurological: nonresponsive to pain stimuli, does not move extremities, does not follow commands    MEDICATIONS:  MEDICATIONS  (STANDING):  aspirin  chewable 81 milliGRAM(s) Oral daily  atorvastatin 80 milliGRAM(s) Oral at bedtime  chlorhexidine 0.12% Liquid 15 milliLiter(s) Swish and Spit two times a day  chlorhexidine 2% Cloths 1 Application(s) Topical daily  dextrose 5%. 1000 milliLiter(s) (50 mL/Hr) IV Continuous <Continuous>  dextrose 50% Injectable 12.5 Gram(s) IV Push once  dextrose 50% Injectable 25 Gram(s) IV Push once  dextrose 50% Injectable 25 Gram(s) IV Push once  ergocalciferol Drops 6000 Unit(s) Oral daily  escitalopram 5 milliGRAM(s) Oral daily  folic acid 1 milliGRAM(s) Oral daily  hydrocortisone sodium succinate Injectable 50 milliGRAM(s) IV Push every 8 hours  insulin glargine Injectable (LANTUS) 20 Unit(s) SubCutaneous at bedtime  insulin lispro (HumaLOG) corrective regimen sliding scale   SubCutaneous every 6 hours  multivitamin 1 Tablet(s) Oral daily  norepinephrine Infusion 0.01 MICROgram(s)/kG/Min (1.5 mL/Hr) IV Continuous <Continuous>  pantoprazole   Suspension 40 milliGRAM(s) Oral daily  PureFlow Dialysate RFP-400 (K 2 / Ca 3) 5000 milliLiter(s) (1500 mL/Hr) CRRT <Continuous>  sodium chloride 0.9% lock flush 20 milliLiter(s) IV Push once  thiamine 100 milliGRAM(s) Oral daily    MEDICATIONS  (PRN):  acetaminophen    Suspension. 650 milliGRAM(s) Oral every 6 hours PRN Moderate Pain (4 - 6)  dextrose Gel 1 Dose(s) Oral once PRN Blood Glucose LESS THAN 70 milliGRAM(s)/deciliter  glucagon  Injectable 1 milliGRAM(s) IntraMuscular once PRN Glucose LESS THAN 70 milligrams/deciliter  sodium chloride 0.9% lock flush 10 milliLiter(s) IV Push every 1 hour PRN After each medication administration  sodium chloride 0.9% lock flush 10 milliLiter(s) IV Push every 12 hours PRN Lumen of catheter NOT used      ALLERGIES:  Allergies  Allergy Status Unknown  Intolerances    LABS:                        7.8    8.7   )-----------( 71       ( 27 Mar 2018 12:18 )             25.3     03-27    135  |  99  |  35<H>  ----------------------------<  303<H>  3.5   |  23  |  1.91<H>    Ca    8.2<L>      27 Mar 2018 12:18  Phos  3.2     03-27  Mg     1.8     03-27    TPro  5.8<L>  /  Alb  3.2<L>  /  TBili  1.4<H>  /  DBili  x   /  AST  23  /  ALT  27  /  AlkPhos  225<H>  03-27    PT/INR - ( 27 Mar 2018 05:01 )   PT: 32.4 sec;   INR: 2.86          PTT - ( 27 Mar 2018 05:01 )  PTT:48.9 sec    CAPILLARY BLOOD GLUCOSE      POCT Blood Glucose.: 252 mg/dL (27 Mar 2018 12:09)      RADIOLOGY & ADDITIONAL TESTS: Reviewed.    Xray Chest 3/27: No official read yet, but looks like pulmonary vascular congestion with some consistent right lower lobe consolidation not significantly changed from yesterday. OVERNIGHT EVENTS: ELSIE.    SUBJECTIVE / INTERVAL HPI: Patient seen and examined at bedside. Patient intubated and sedated, unable to actively participate in exam.     VITAL SIGNS:  Vital Signs Last 24 Hrs  T(C): 36.6 (27 Mar 2018 10:00), Max: 37 (26 Mar 2018 18:00)  T(F): 97.8 (27 Mar 2018 10:00), Max: 98.6 (26 Mar 2018 18:00)  HR: 76 (27 Mar 2018 13:00) (70 - 98)  BP: 103/82 (26 Mar 2018 15:00) (103/82 - 103/82)  BP(mean): --  RR: 14 (27 Mar 2018 13:00) (13 - 18)  SpO2: 100% (27 Mar 2018 13:00) (97% - 100%)    PHYSICAL EXAM:  General: WDWN, laying in bed, in no acute distress on ventilator  HEENT: NC/AT; PERRL, anicteric sclera; MMM  Neck: supple, JVD present  Cardiovascular: +S1/S2; RRR  Respiratory: bibasilar and lateral crackles, on ventilator breathing comfortably  Gastrointestinal: soft, NT/ND; +BSx4 normoactive  Extremities: WWP; clubbing or cyanosis, edema to knees bilaterally  Skin: left ankle wrapped in area of cellulitis, cracked skin over shins bilaterally  Vascular: 1+ radial, DP/PT pulses B/L  Neurological: nonresponsive to pain stimuli, does not move extremities, does not follow commands    MEDICATIONS:  MEDICATIONS  (STANDING):  aspirin  chewable 81 milliGRAM(s) Oral daily  atorvastatin 80 milliGRAM(s) Oral at bedtime  chlorhexidine 0.12% Liquid 15 milliLiter(s) Swish and Spit two times a day  chlorhexidine 2% Cloths 1 Application(s) Topical daily  dextrose 5%. 1000 milliLiter(s) (50 mL/Hr) IV Continuous <Continuous>  dextrose 50% Injectable 12.5 Gram(s) IV Push once  dextrose 50% Injectable 25 Gram(s) IV Push once  dextrose 50% Injectable 25 Gram(s) IV Push once  ergocalciferol Drops 6000 Unit(s) Oral daily  escitalopram 5 milliGRAM(s) Oral daily  folic acid 1 milliGRAM(s) Oral daily  hydrocortisone sodium succinate Injectable 50 milliGRAM(s) IV Push every 8 hours  insulin glargine Injectable (LANTUS) 20 Unit(s) SubCutaneous at bedtime  insulin lispro (HumaLOG) corrective regimen sliding scale   SubCutaneous every 6 hours  multivitamin 1 Tablet(s) Oral daily  norepinephrine Infusion 0.01 MICROgram(s)/kG/Min (1.5 mL/Hr) IV Continuous <Continuous>  pantoprazole   Suspension 40 milliGRAM(s) Oral daily  PureFlow Dialysate RFP-400 (K 2 / Ca 3) 5000 milliLiter(s) (1500 mL/Hr) CRRT <Continuous>  sodium chloride 0.9% lock flush 20 milliLiter(s) IV Push once  thiamine 100 milliGRAM(s) Oral daily    MEDICATIONS  (PRN):  acetaminophen    Suspension. 650 milliGRAM(s) Oral every 6 hours PRN Moderate Pain (4 - 6)  dextrose Gel 1 Dose(s) Oral once PRN Blood Glucose LESS THAN 70 milliGRAM(s)/deciliter  glucagon  Injectable 1 milliGRAM(s) IntraMuscular once PRN Glucose LESS THAN 70 milligrams/deciliter  sodium chloride 0.9% lock flush 10 milliLiter(s) IV Push every 1 hour PRN After each medication administration  sodium chloride 0.9% lock flush 10 milliLiter(s) IV Push every 12 hours PRN Lumen of catheter NOT used      ALLERGIES:  Allergies  Allergy Status Unknown  Intolerances    LABS:                        7.8    8.7   )-----------( 71       ( 27 Mar 2018 12:18 )             25.3     03-27    135  |  99  |  35<H>  ----------------------------<  303<H>  3.5   |  23  |  1.91<H>    Ca    8.2<L>      27 Mar 2018 12:18  Phos  3.2     03-27  Mg     1.8     03-27    TPro  5.8<L>  /  Alb  3.2<L>  /  TBili  1.4<H>  /  DBili  x   /  AST  23  /  ALT  27  /  AlkPhos  225<H>  03-27    PT/INR - ( 27 Mar 2018 05:01 )   PT: 32.4 sec;   INR: 2.86          PTT - ( 27 Mar 2018 05:01 )  PTT:48.9 sec    CAPILLARY BLOOD GLUCOSE      POCT Blood Glucose.: 252 mg/dL (27 Mar 2018 12:09)      RADIOLOGY & ADDITIONAL TESTS: Reviewed.    Xray Chest 3/27: No official read yet, but some pulmonary vascular congestion with right lower lobe consolidation not significantly changed from yesterday.

## 2018-03-27 NOTE — PROGRESS NOTE ADULT - SUBJECTIVE AND OBJECTIVE BOX
the patient seen in the morning - intubated, on pressor support, still on CVVHD no issues, stopped from the primary team in the morning, No issues with the machine     Renal service follows for the need of fluid management in the setting of CHF, GILMER - oliguria       Patient seen and examined at bedside.     acetaminophen    Suspension. 650 milliGRAM(s) every 6 hours PRN  aspirin  chewable 81 milliGRAM(s) daily  atorvastatin 80 milliGRAM(s) at bedtime  chlorhexidine 0.12% Liquid 15 milliLiter(s) two times a day  chlorhexidine 2% Cloths 1 Application(s) daily  dextrose 5%. 1000 milliLiter(s) <Continuous>  dextrose 50% Injectable 12.5 Gram(s) once  dextrose 50% Injectable 25 Gram(s) once  dextrose 50% Injectable 25 Gram(s) once  dextrose Gel 1 Dose(s) once PRN  ergocalciferol Drops 6000 Unit(s) daily  escitalopram 5 milliGRAM(s) daily  folic acid 1 milliGRAM(s) daily  glucagon  Injectable 1 milliGRAM(s) once PRN  hydrocortisone sodium succinate Injectable 50 milliGRAM(s) every 8 hours  insulin glargine Injectable (LANTUS) 20 Unit(s) at bedtime  insulin lispro (HumaLOG) corrective regimen sliding scale   every 6 hours  modafinil 100 milliGRAM(s) <User Schedule>  multivitamin 1 Tablet(s) daily  norepinephrine Infusion 0.01 MICROgram(s)/kG/Min <Continuous>  pantoprazole   Suspension 40 milliGRAM(s) daily  PureFlow Dialysate RFP-400 (K 2 / Ca 3) 5000 milliLiter(s) <Continuous>  sodium chloride 0.9% lock flush 10 milliLiter(s) every 1 hour PRN  sodium chloride 0.9% lock flush 10 milliLiter(s) every 12 hours PRN  sodium chloride 0.9% lock flush 20 milliLiter(s) once  thiamine 100 milliGRAM(s) daily      Allergies    Allergy Status Unknown    Intolerances        T(C): , Max: 37 (03-26-18 @ 18:00)  T(F): , Max: 98.6 (03-26-18 @ 18:00)  HR: 86 (03-27-18 @ 15:00)  BP: --  BP(mean): --  RR: 14 (03-27-18 @ 15:00)  SpO2: 100% (03-27-18 @ 15:00)  Wt(kg): --    03-26 @ 07:01 - 03-27 @ 07:00  --------------------------------------------------------  IN:    Enteral Tube Flush: 70 mL    Nepro with Carb Steady: 1210 mL    norepinephrine Infusion: 83 mL    Solution: 100 mL    Solution: 65 mL    Solution: 588 mL    vasopressin Infusion: 48 mL    vasopressin Infusion: 9.6 mL  Total IN: 2173.6 mL    OUT:    Other: 1467 mL  Total OUT: 1467 mL    Total NET: 706.6 mL      03-27 @ 07:01 - 03-27 @ 15:06  --------------------------------------------------------  IN:    Nepro with Carb Steady: 385 mL    norepinephrine Infusion: 26 mL    Solution: 510 mL    vasopressin Infusion: 9.6 mL  Total IN: 930.6 mL    OUT:    Other: 73 mL  Total OUT: 73 mL    Total NET: 857.6 mL    Physical exam:   Intubated and sedated   No JVD   Normal air entry into the lungs, no wheezing, no crackles   RRR, normal s1, s2, no murmurs, rubs or gallops   Abdomen - soft, not tender, not distended   Extremities: no edema   HAs a HD catheter on the right of his neck        LABS:                        7.8    8.7   )-----------( 71       ( 27 Mar 2018 12:18 )             25.3     03-27    135  |  99  |  35<H>  ----------------------------<  303<H>  3.5   |  23  |  1.91<H>    Ca    8.2<L>      27 Mar 2018 12:18  Phos  3.2     03-27  Mg     1.8     03-27    TPro  5.8<L>  /  Alb  3.2<L>  /  TBili  1.4<H>  /  DBili  x   /  AST  23  /  ALT  27  /  AlkPhos  225<H>  03-27    Osmolality, Serum: 299 mosm/kg [280 - 301] (03-27 @ 09:23)    PT/INR - ( 27 Mar 2018 05:01 )   PT: 32.4 sec;   INR: 2.86          PTT - ( 27 Mar 2018 05:01 )  PTT:48.9 sec          RADIOLOGY & ADDITIONAL STUDIES:

## 2018-03-27 NOTE — PROGRESS NOTE ADULT - ASSESSMENT
64 yo M history of HFrEF 10-15% 2/2 ischemic cardiomyopathy, MI , s/p AICD vs PPM, ?Afib, Hypertension, Diabetes Mellitus Type 2 on insulin, CKD?and gout, who presents with a chief complaint of generalized weakness. Now shakira - atn on CVVHD for managing the fluid status - we will continue with CVVHD with 25 ml/h UF, please cardiology team to follow, think he can not tolerate the UF through HD. In the paper chart records from [facility] creat of 2.2 in [place], talked to the daughter she will give me the phone tomorrow to call his PCP (daughter number [phone])

## 2018-03-27 NOTE — PROGRESS NOTE ADULT - PROBLEM SELECTOR PLAN 3
cvvhd has stopped. Pt will possibly  return to HD as of today.  Renal recovery is difficulty to predict as pts baseline values are not available  case d/w Dr Guzman

## 2018-03-27 NOTE — PROGRESS NOTE ADULT - ATTENDING COMMENTS
Pt remains critically ill, intubated on mechanical vent.   Very limited neuro exam, pt not responsive to command but responds to pain. MRI with chronic and subacute strokes, but nothing acute to explain his sudden decompensation. Appears clinically and hemodynamically stable from infectious and cardiorespiratory standpoint. EEG read pending but no evidence of subclinical seizure at the moment. Will d/c Zosyn and stop CVVH for now. D/C vasopressin. Minimize all meds and optimize blood sugar control in case a toxic metabolic issue is present. Will continue to provide supportive care and discuss case with Neuro team. Unclear purpose of LP at this time as event was very acute and no evidence of infection at this time.

## 2018-03-27 NOTE — PROGRESS NOTE ADULT - ASSESSMENT
The most likely explanation for the patient's status given the clinical, radiographic, and electrophysiologic information is that he had hypoperfusion injury to the brain / brainstem in the posterior circulation.     Recommend modafinil 100mg BID first dose now, then BID at ~7am and 1pm. Monitor clinically for improvement. Please note that he may be more aware of surroundings than he is able to indicate, potentially in a type of "locked in" state.

## 2018-03-27 NOTE — PROGRESS NOTE ADULT - SUBJECTIVE AND OBJECTIVE BOX
INTERVAL HPI/OVERNIGHT EVENTS: ELSIE    SUBJECTIVE: Patient seen and examined at bedside. Pt able to move R toes on command however no other response to verbal or painful stimuli     OBJECTIVE:    VITAL SIGNS:  ICU Vital Signs Last 24 Hrs  T(C): 36.2 (27 Mar 2018 01:19), Max: 37 (26 Mar 2018 18:00)  T(F): 97.1 (27 Mar 2018 01:19), Max: 98.6 (26 Mar 2018 18:00)  HR: 74 (27 Mar 2018 07:00) (74 - 98)  BP: 103/82 (26 Mar 2018 15:00) (85/71 - 103/82)  BP(mean): 76 (26 Mar 2018 09:00) (76 - 76)  ABP: 96/66 (27 Mar 2018 07:00) (89/58 - 158/124)  ABP(mean): 78 (27 Mar 2018 07:00) (68 - 136)  RR: 13 (27 Mar 2018 07:00) (13 - 18)  SpO2: 100% (27 Mar 2018 07:00) (100% - 100%)    Mode: AC/ CMV (Assist Control/ Continuous Mandatory Ventilation), RR (machine): 14, TV (machine): 500, FiO2: 40, PEEP: 5, ITime: 0.9, MAP: 7.7, PIP: 18    03-26 @ 07:01  -  03-27 @ 07:00  --------------------------------------------------------  IN: 2054.8 mL / OUT: 1252 mL / NET: 802.8 mL      CAPILLARY BLOOD GLUCOSE      POCT Blood Glucose.: 245 mg/dL (27 Mar 2018 06:26)      PHYSICAL EXAM:    General: Intubated.  HEENT: NC/AT; PERRL, Upward gaze, no tracking.   Neck: supple, R HD cath, L IJ  Respiratory: cta bl   Cardiovascular: +S1/S2; RRR, ii/vi systolic murmur   Abdomen: soft, NT/ND; +BS x4  : Decreased testicular edema   Extremities:  2+ LE edema. b/l venous stasis changes. cool to touch   Vascular: WWP, 2+ peripheral pulses b/l;  Skin: normal color and turgor; no rash  Neuro: Does not respond to verbal or painful stimuli      MEDICATIONS:  MEDICATIONS  (STANDING):  aspirin  chewable 81 milliGRAM(s) Oral daily  atorvastatin 80 milliGRAM(s) Oral at bedtime  chlorhexidine 0.12% Liquid 15 milliLiter(s) Swish and Spit two times a day  chlorhexidine 2% Cloths 1 Application(s) Topical daily  dextrose 5%. 1000 milliLiter(s) (50 mL/Hr) IV Continuous <Continuous>  dextrose 50% Injectable 12.5 Gram(s) IV Push once  dextrose 50% Injectable 25 Gram(s) IV Push once  dextrose 50% Injectable 25 Gram(s) IV Push once  ergocalciferol Drops 15839 Unit(s) Oral <User Schedule>  escitalopram 5 milliGRAM(s) Oral daily  folic acid 1 milliGRAM(s) Oral daily  hydrocortisone sodium succinate Injectable 50 milliGRAM(s) IV Push every 6 hours  insulin glargine Injectable (LANTUS) 20 Unit(s) SubCutaneous at bedtime  insulin lispro (HumaLOG) corrective regimen sliding scale   SubCutaneous every 6 hours  multivitamin 1 Tablet(s) Oral daily  norepinephrine Infusion 0.01 MICROgram(s)/kG/Min (1.5 mL/Hr) IV Continuous <Continuous>  pantoprazole  Injectable 40 milliGRAM(s) IV Push daily  potassium phosphate IVPB 30 milliMole(s) IV Intermittent once  PureFlow Dialysate RFP-400 (K 2 / Ca 3) 5000 milliLiter(s) (1500 mL/Hr) CRRT <Continuous>  sodium chloride 0.9% lock flush 20 milliLiter(s) IV Push once  thiamine 100 milliGRAM(s) Oral daily  vasopressin Infusion 0.04 Unit(s)/Min (2.4 mL/Hr) IV Continuous <Continuous>    MEDICATIONS  (PRN):  acetaminophen    Suspension. 650 milliGRAM(s) Oral every 6 hours PRN Moderate Pain (4 - 6)  dextrose Gel 1 Dose(s) Oral once PRN Blood Glucose LESS THAN 70 milliGRAM(s)/deciliter  glucagon  Injectable 1 milliGRAM(s) IntraMuscular once PRN Glucose LESS THAN 70 milligrams/deciliter  sodium chloride 0.9% lock flush 10 milliLiter(s) IV Push every 1 hour PRN After each medication administration  sodium chloride 0.9% lock flush 10 milliLiter(s) IV Push every 12 hours PRN Lumen of catheter NOT used      ALLERGIES:  Allergies    Allergy Status Unknown    Intolerances        LABS:                        8.5    11.6  )-----------( 86       ( 27 Mar 2018 05:01 )             26.3     03-27    137  |  101  |  31<H>  ----------------------------<  262<H>  3.7   |  25  |  1.79<H>    Ca    8.6      27 Mar 2018 05:01  Phos  2.4     03-27  Mg     1.8     03-27    TPro  5.8<L>  /  Alb  3.2<L>  /  TBili  1.4<H>  /  DBili  x   /  AST  23  /  ALT  27  /  AlkPhos  225<H>  03-27    PT/INR - ( 27 Mar 2018 05:01 )   PT: 32.4 sec;   INR: 2.86          PTT - ( 27 Mar 2018 05:01 )  PTT:48.9 sec      RADIOLOGY & ADDITIONAL TESTS: Reviewed. INTERVAL HPI/OVERNIGHT EVENTS: ELSIE    SUBJECTIVE: Patient seen and examined at bedside. Pt able to move R toes on command however no other response to verbal or painful stimuli     OBJECTIVE:    VITAL SIGNS:  ICU Vital Signs Last 24 Hrs  T(C): 36.2 (27 Mar 2018 01:19), Max: 37 (26 Mar 2018 18:00)  T(F): 97.1 (27 Mar 2018 01:19), Max: 98.6 (26 Mar 2018 18:00)  HR: 74 (27 Mar 2018 07:00) (74 - 98)  BP: 103/82 (26 Mar 2018 15:00) (85/71 - 103/82)  BP(mean): 76 (26 Mar 2018 09:00) (76 - 76)  ABP: 96/66 (27 Mar 2018 07:00) (89/58 - 158/124)  ABP(mean): 78 (27 Mar 2018 07:00) (68 - 136)  RR: 13 (27 Mar 2018 07:00) (13 - 18)  SpO2: 100% (27 Mar 2018 07:00) (100% - 100%)    Mode: AC/ CMV (Assist Control/ Continuous Mandatory Ventilation), RR (machine): 14, TV (machine): 500, FiO2: 40, PEEP: 5, ITime: 0.9, MAP: 7.7, PIP: 18    03-26 @ 07:01  -  03-27 @ 07:00  --------------------------------------------------------  IN: 2054.8 mL / OUT: 1252 mL / NET: 802.8 mL      CAPILLARY BLOOD GLUCOSE      POCT Blood Glucose.: 245 mg/dL (27 Mar 2018 06:26)      PHYSICAL EXAM:    General: Intubated.  HEENT: NC/AT; PERRL, Upward gaze, no tracking.   Neck: supple, R HD cath, L IJ  Respiratory: cta bl   Cardiovascular: +S1/S2; RRR, ii/vi systolic murmur   Abdomen: soft, distended; +BS x4  : Decreased testicular edema   Extremities:  2+ LE edema. b/l venous stasis changes. cool to touch   Vascular: WWP, 2+ peripheral pulses b/l;  Skin: normal color and turgor; no rash  Neuro: Moves toes on verbal command however does not follow other commands. Corneal reflex intact. No eye tracking.     MEDICATIONS:  MEDICATIONS  (STANDING):  aspirin  chewable 81 milliGRAM(s) Oral daily  atorvastatin 80 milliGRAM(s) Oral at bedtime  chlorhexidine 0.12% Liquid 15 milliLiter(s) Swish and Spit two times a day  chlorhexidine 2% Cloths 1 Application(s) Topical daily  dextrose 5%. 1000 milliLiter(s) (50 mL/Hr) IV Continuous <Continuous>  dextrose 50% Injectable 12.5 Gram(s) IV Push once  dextrose 50% Injectable 25 Gram(s) IV Push once  dextrose 50% Injectable 25 Gram(s) IV Push once  ergocalciferol Drops 04044 Unit(s) Oral <User Schedule>  escitalopram 5 milliGRAM(s) Oral daily  folic acid 1 milliGRAM(s) Oral daily  hydrocortisone sodium succinate Injectable 50 milliGRAM(s) IV Push every 6 hours  insulin glargine Injectable (LANTUS) 20 Unit(s) SubCutaneous at bedtime  insulin lispro (HumaLOG) corrective regimen sliding scale   SubCutaneous every 6 hours  multivitamin 1 Tablet(s) Oral daily  norepinephrine Infusion 0.01 MICROgram(s)/kG/Min (1.5 mL/Hr) IV Continuous <Continuous>  pantoprazole  Injectable 40 milliGRAM(s) IV Push daily  potassium phosphate IVPB 30 milliMole(s) IV Intermittent once  PureFlow Dialysate RFP-400 (K 2 / Ca 3) 5000 milliLiter(s) (1500 mL/Hr) CRRT <Continuous>  sodium chloride 0.9% lock flush 20 milliLiter(s) IV Push once  thiamine 100 milliGRAM(s) Oral daily  vasopressin Infusion 0.04 Unit(s)/Min (2.4 mL/Hr) IV Continuous <Continuous>    MEDICATIONS  (PRN):  acetaminophen    Suspension. 650 milliGRAM(s) Oral every 6 hours PRN Moderate Pain (4 - 6)  dextrose Gel 1 Dose(s) Oral once PRN Blood Glucose LESS THAN 70 milliGRAM(s)/deciliter  glucagon  Injectable 1 milliGRAM(s) IntraMuscular once PRN Glucose LESS THAN 70 milligrams/deciliter  sodium chloride 0.9% lock flush 10 milliLiter(s) IV Push every 1 hour PRN After each medication administration  sodium chloride 0.9% lock flush 10 milliLiter(s) IV Push every 12 hours PRN Lumen of catheter NOT used      ALLERGIES:  Allergies    Allergy Status Unknown    Intolerances        LABS:                        8.5    11.6  )-----------( 86       ( 27 Mar 2018 05:01 )             26.3     03-27    137  |  101  |  31<H>  ----------------------------<  262<H>  3.7   |  25  |  1.79<H>    Ca    8.6      27 Mar 2018 05:01  Phos  2.4     03-27  Mg     1.8     03-27    TPro  5.8<L>  /  Alb  3.2<L>  /  TBili  1.4<H>  /  DBili  x   /  AST  23  /  ALT  27  /  AlkPhos  225<H>  03-27    PT/INR - ( 27 Mar 2018 05:01 )   PT: 32.4 sec;   INR: 2.86          PTT - ( 27 Mar 2018 05:01 )  PTT:48.9 sec      RADIOLOGY & ADDITIONAL TESTS: Reviewed.

## 2018-03-27 NOTE — CHART NOTE - NSCHARTNOTEFT_GEN_A_CORE
Admitting Diagnosis:   63M PMH HFrEF 10-15% (ischemic), MI, s/p AICD vs PPM, possible Afib, HTN, DM2 on insulin, possible CKD, and gout, who presents with a chief complaint of generalized weakness s/p septic shock likely 2/2 LE cellulitis. Now with AMS and new leukocytisis likely 2/2 UTI     PAST MEDICAL & SURGICAL HISTORY:  Type 2 diabetes mellitus with diabetic peripheral angiopathy without gangrene, with long-term curren  Essential hypertension, benign  Gout  Pacemaker  Chronic systolic heart failure  Myocardial infarction  No significant past surgical history    Current Nutrition Order: Nepro at 55 mL x 24 hr (route per MD) to provide 1320 mL TV, 2376 kcal, 107 g protein, & 959 mL Fluid. Running at goal rate.     GI Issues: last BM 3/26    Pain: GONSALO 2* vent    Skin Integrity: Stage 2- L buttocks    Labs:   03-27    137  |  101  |  31<H>  ----------------------------<  262<H>  3.7   |  25  |  1.79<H>    Ca    8.6      27 Mar 2018 05:01  Phos  2.4     03-27  Mg     1.8     03-27    TPro  5.8<L>  /  Alb  3.2<L>  /  TBili  1.4<H>  /  DBili  x   /  AST  23  /  ALT  27  /  AlkPhos  225<H>  03-27    POCT Blood Glucose.: 245 mg/dL (27 Mar 2018 06:26)  POCT Blood Glucose.: 269 mg/dL (26 Mar 2018 22:09)  POCT Blood Glucose.: 302 mg/dL (26 Mar 2018 17:38)  POCT Blood Glucose.: 332 mg/dL (26 Mar 2018 11:13)    Medications:  MEDICATIONS  (STANDING):  aspirin  chewable 81 milliGRAM(s) Oral daily  atorvastatin 80 milliGRAM(s) Oral at bedtime  chlorhexidine 0.12% Liquid 15 milliLiter(s) Swish and Spit two times a day  chlorhexidine 2% Cloths 1 Application(s) Topical daily  dextrose 5%. 1000 milliLiter(s) (50 mL/Hr) IV Continuous <Continuous>  dextrose 50% Injectable 12.5 Gram(s) IV Push once  dextrose 50% Injectable 25 Gram(s) IV Push once  dextrose 50% Injectable 25 Gram(s) IV Push once  ergocalciferol Drops 6000 Unit(s) Oral daily  escitalopram 5 milliGRAM(s) Oral daily  folic acid 1 milliGRAM(s) Oral daily  hydrocortisone sodium succinate Injectable 50 milliGRAM(s) IV Push every 8 hours  insulin glargine Injectable (LANTUS) 20 Unit(s) SubCutaneous at bedtime  insulin lispro (HumaLOG) corrective regimen sliding scale   SubCutaneous every 6 hours  multivitamin 1 Tablet(s) Oral daily  norepinephrine Infusion 0.01 MICROgram(s)/kG/Min (1.5 mL/Hr) IV Continuous <Continuous>  pantoprazole   Suspension 40 milliGRAM(s) Oral daily  PureFlow Dialysate RFP-400 (K 2 / Ca 3) 5000 milliLiter(s) (1500 mL/Hr) CRRT <Continuous>  sodium chloride 0.9% lock flush 20 milliLiter(s) IV Push once  thiamine 100 milliGRAM(s) Oral daily    MEDICATIONS  (PRN):  acetaminophen    Suspension. 650 milliGRAM(s) Oral every 6 hours PRN Moderate Pain (4 - 6)  dextrose Gel 1 Dose(s) Oral once PRN Blood Glucose LESS THAN 70 milliGRAM(s)/deciliter  glucagon  Injectable 1 milliGRAM(s) IntraMuscular once PRN Glucose LESS THAN 70 milligrams/deciliter  sodium chloride 0.9% lock flush 10 milliLiter(s) IV Push every 1 hour PRN After each medication administration  sodium chloride 0.9% lock flush 10 milliLiter(s) IV Push every 12 hours PRN Lumen of catheter NOT used    Weight:  3/16 93.4 kg  3/17 85.4 kg  3/19 85.9 kg  3/20 90.1 kg    Weight Change: No new weights to assess    Estimated energy needs using 89 kg IBW: Needs estimated 2/2 intubation  Calories: 25-30 kcal/kg = 6891-4612 kcal/day  Protein: 1.4-1.6 g/kg = 125-142 g protein/day  Fluids 500 mL + UOP 2/2 HD    Subjective: pt remains intubated with EN running at goal rate. CVVHD running at present. Noted low phos levels, can consider changing formula to standard.     Previous Nutrition Diagnosis: Inadequate energy intake RT inability to take PO AEB NPO/intubation    Active [ X  ]  Resolved [   ]    Goal: Meet % of nutrition needs via tolerated route.     Recommendations:  1. Glucerna 1.2 at 80 mL x 24 hr (route per MD) to provide 1920 mL TV, 2304 kcal, 115 g protein, & 1546 mL fluid. Start at 30 mL, increase by 10 mL q4 to goal rate. Monitor for signs of intolerance & maintain aspiration precautions.   2. Trend daily weights    Education: N/A-vent    Risk Level: High [ X  ] Moderate [   ] Low [   ]

## 2018-03-27 NOTE — PROGRESS NOTE ADULT - SUBJECTIVE AND OBJECTIVE BOX
HUNTER BRIZUELA   MRN-[id number]     (1955):     HPI:  62 yo M history of HFrEF 10-15% 2/2 ischemic cardiomyopathy, MI , s/p AICD vs PPM, ?Afib, Hypertension, Diabetes Mellitus Type 2 on insulin, CKD?and gout, who presents with a chief complaint of generalized weakness. Pt wad admitted to [facility] 2 months ago for gout and was sent to rehab. He was discharged home mid Feb with VNS. In the past 2 weeks patient has noted increased leg pain and weakness bilaterally. In the last few days, he has also experienced generalized weakness prompting him to come to ER.   In ER, noted to have t max of 102, SBP 70s, leukocytosis to 32.8, Lactate of 6.6, Acute renal failure with severe metabolic derangements. Given 3.5L fluids and Vanc/Zosyn. MICU consulted. (10 Mar 2018 18:51)    Pt has had a complicated course including pleural effusion with chest tube placement, renal failure with both CVVHD and then HD and significant hypotension requiring vasopressors.  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  Pt was converted back to CVVHD due to his HD related hypotension.   Last night, pt had an episode of unresponsiveness.  Pt was intubated for airway protection.  Imaging was negative for CVA.  VEEG in place.  Pt remains on CVVHD and is on vasopressin and levophed.   He is lightly sedated at this time and can respond weakly to intermittent simple commands.     Pt remains intubated  He is off sedation and has a poor mental status.  Pt is off CVVHD now.  Pt remains on levophed and vasopressin.      ROS:   nonverbal as pt is intubated.   Unable to attain due to:  oral intubation and sedation                    Dyspnea (Heriberto 0-10):                       N/V (Y/N):                            Secretions (Y/N):              Agitation(Y/N):  Pain (Y/N):       -Provocation/Palliation:  -Quality/Quantity:  -Radiating:  -Severity:  -Timing/Frequency:  -Impact on ADLs:    Allergies:  No Known Allergies    Intolerances    Opiate Naive (Y/N):   yes  -iStop reviewed (Y/N):  yes ref # [id number]      MEDICATIONS  (STANDING):  aspirin  chewable 81 milliGRAM(s) Oral daily  atorvastatin 80 milliGRAM(s) Oral at bedtime  chlorhexidine 0.12% Liquid 15 milliLiter(s) Swish and Spit two times a day  chlorhexidine 2% Cloths 1 Application(s) Topical daily  dextrose 5%. 1000 milliLiter(s) (50 mL/Hr) IV Continuous <Continuous>  dextrose 50% Injectable 12.5 Gram(s) IV Push once  dextrose 50% Injectable 25 Gram(s) IV Push once  dextrose 50% Injectable 25 Gram(s) IV Push once  ergocalciferol Drops 6000 Unit(s) Oral daily  escitalopram 5 milliGRAM(s) Oral daily  folic acid 1 milliGRAM(s) Oral daily  hydrocortisone sodium succinate Injectable 50 milliGRAM(s) IV Push every 8 hours  insulin glargine Injectable (LANTUS) 20 Unit(s) SubCutaneous at bedtime  insulin lispro (HumaLOG) corrective regimen sliding scale   SubCutaneous every 6 hours  multivitamin 1 Tablet(s) Oral daily  norepinephrine Infusion 0.01 MICROgram(s)/kG/Min (1.5 mL/Hr) IV Continuous <Continuous>  pantoprazole   Suspension 40 milliGRAM(s) Oral daily  PureFlow Dialysate RFP-400 (K 2 / Ca 3) 5000 milliLiter(s) (1500 mL/Hr) CRRT <Continuous>  sodium chloride 0.9% lock flush 20 milliLiter(s) IV Push once  thiamine 100 milliGRAM(s) Oral daily    MEDICATIONS  (PRN):  acetaminophen    Suspension. 650 milliGRAM(s) Oral every 6 hours PRN Moderate Pain (4 - 6)  dextrose Gel 1 Dose(s) Oral once PRN Blood Glucose LESS THAN 70 milliGRAM(s)/deciliter  glucagon  Injectable 1 milliGRAM(s) IntraMuscular once PRN Glucose LESS THAN 70 milligrams/deciliter  sodium chloride 0.9% lock flush 10 milliLiter(s) IV Push every 1 hour PRN After each medication administration  sodium chloride 0.9% lock flush 10 milliLiter(s) IV Push every 12 hours PRN Lumen of catheter NOT used      Labs:                                   8.5    11.6  )-----------( 86       ( 27 Mar 2018 05:01 )             26.3     03-27    137  |  101  |  31<H>  ----------------------------<  262<H>  3.7   |  25  |  1.79<H>    Ca    8.6      27 Mar 2018 05:01  Phos  2.4       Mg     1.8         TPro  5.8<L>  /  Alb  3.2<L>  /  TBili  1.4<H>  /  DBili  x   /  AST  23  /  ALT  27  /  AlkPhos  225<H>        Imaging:      Reviewed      EXAM:  NM INFLAMMATORY LOC GALLIUM WB                          PROCEDURE DATE:  2018         ******PRELIMINARY REPORT******    ******PRELIMINARY REPORT******      INTERPRETATION:  RESIDENT PRELIMINARY REPORT    INFLAMMATION IMAGING - WHOLE-BODY    Indication: Rule out infectious focus. Cellulitis not improving on   antibiotic therapy.    Procedure: The patient received an intravenous injection of 4.91 mCi of   gallium-67 citrate on date and images of the whole-body were obtained at   about 24 hours.    Findings: There is abnormally increased activity of the anterior and   posterior aspects of the left lower leg. Findings are suggestive of   cellulitis. No focal increased uptake is identified to suggest   phlegmon/abscess. There is slightly increased activity at the sacroiliac   joints bilaterally. No other abnormal uptake is identified.    Impression: Findings suggestive of cellulitis as above.    < end of copied text >    PEx:  ICU Vital Signs Last 24 Hrs  ICU Vital Signs Last 24 Hrs  T(C): 36.6 (27 Mar 2018 10:00), Max: 37 (26 Mar 2018 18:00)  T(F): 97.8 (27 Mar 2018 10:00), Max: 98.6 (26 Mar 2018 18:00)  HR: 74 (27 Mar 2018 11:00) (70 - 98)  BP: 103/82 (26 Mar 2018 15:00) (103/82 - 103/82)  BP(mean): --  ABP: 96/64 (27 Mar 2018 11:00) (89/58 - 158/124)  ABP(mean): 74 (27 Mar 2018 11:00) (68 - 136)  RR: 18 (27 Mar 2018 11:00) (13 - 18)  SpO2: 100% (27 Mar 2018 11:00) (100% - 100%)      General:  ill appearing, thin, not distressed   HEENT:  nc/at, thin,  temporal wasting, moist oral cavity, orally intubated, + dobhoff tube  Neck:   supple, neg lymphadenopathy, + R HD catheter, L IJ TLC  CVS:  irreg, frequent ventricular ectopy, rate controlled, pacer noted to  L chest wall  Resp:  non-labored, vented  GI:   large, distended slightly, soft, + BS nontender, slight dependent edema  :   no barnes present, + scrotal edema  Musc:   weak, thin, no spontaneous movement  Ext: crusty, dressed lower extremities  Neuro: off sedation, not following commands, not waking to voice  Psych: maritza  Skin:  thin, dry, cool, + generalized edema  Lymph:  neg  Preadmit Karnofsky:   50 %           Current Karnofsky:   30    %  Cachexia (Y/N):   yes  BMI:  22    Advanced Directives:     Full Code     HCP noted on chart     DPOA  (for finances)       Decision maker:   pt was deemed not to have decision making capacity on  by psychiatry  Legal surrogate:  pts dgt Cristal Castro [phone] is pts HCP    Social History:   single, lives by self, pt has a HHA 4hrs/day, 3 days per week.  Pt has 2 adult children (Cristal aged 37) and pts son, is 24 and lives in [facility]. 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    He is retired but reports he is not on disability.   Pt is "spiritual" and practices a "Bahai" raquel background   Per notes, pt has not been compliant with his medication (not picked up prescription from his outpt pharmacy) since 2017.    GOALS OF CARE DISCUSSION:       Palliative care info/counseling provided-- following	           Family meeting- pts dgt//HCP Cristal will be present at 300pm to meet with team on        Advanced Directives addressed please see Advance Care Planning Note-- Ad dir reviewed.  I will continue to explore pts wishes re: advanced directives 	           Documentation of GOC:  trial of critical care          REFERRALS:	        Palliative Med        Unit SW/Case Mgmt       - referred       Massage Therapy-- referred       Music Therapy-- referred       Nutrition-- following

## 2018-03-27 NOTE — PROGRESS NOTE ADULT - ATTENDING COMMENTS
Continue fluid removal with CVVHD.  Will likely need to clear up CXR congestion in order to extubate.

## 2018-03-27 NOTE — PROGRESS NOTE ADULT - ATTENDING COMMENTS
GILMER on baseline Cr of 2.2 in Jan 2018 likely ischemic ATN from septic/cardiogenic shock, remains anuric. Check bladder scans daily. Volume overload w decompensated HF EF 10% and AMS of unclear etiology, neuro and heart failure team on board. Would cont CRRT for now with 75mg/hr off as bp tolerates using levophed for support until any decisions are made regarding pt's prognosis and goals of care. Check LDH/hapto to eval for hemolysis, 1.5 weeks ago this was negative. C3 mildly low, recheck today. Use caution with IV Kphos supplements as he got 500cc NS with those yesterday, concentrate drips or try to change to D5 if possible. Goal net negative 1.5L/day, goal CVPs around 10 or less per HF team.

## 2018-03-27 NOTE — PROGRESS NOTE ADULT - ASSESSMENT
63M PMH HFrEF 10-15% (ischemic), MI, s/p AICD vs PPM, possible Afib, HTN, DM2 on insulin, possible CKD, and gout, who presents with a chief complaint of generalized weakness s/p septic shock likely 2/2 LE cellulitis. Now with AMS and new leukocytisis likely 2/2 UTI     NEURO  #AMS  - Pt non responsive 4 days ago. VItal signs normal. Pt intubated for airway protection. Stroke code called. CT, CTA negative. VEEG in place- moderate slowing but no seizure activity.   - f/u MRI to r/o CVA   -  TTE with Affinity shows no clot.     #   ID- septic shock 2/2 Cellulitis   -Shock initially resolved however pt persistently hypoglycemic, hypothermic with AMS.   - Restarted on Levophed and Vasopressin given low BP. Will try to wean off Levophed.   - c/w steroids 50 q6h.   -.Re-started zosyn yesterdayfor suspected UTI,(3/25-)  s/p Zosyn (3/11-2/21). Was previously also on Vanco (3/11-3/17).   - RUQ dopper shows no portal vein thrombosis   - TTE with no vegetations. Pt not stable for RUKHSANA.   - Gallium scan read shows LLE cellulitis. ,     - repeat LE CT does not show abscess or gas.   - R lung Effusion likely from overload. s/p thoracentesis with improvement in resp status   -  HIV negative  - Vascular surgery on board. Recommending Amputation as an option if pt continues to be unstable.     #UTI- Urine cloudy appearing with increase WBC.  - f/u UA/UC  - re-started zosyn 3/25    CARDIOVASCULAR   # HYPOTENSION-   - c/w Levophed and Vasopressin drip. Will maintain SBP>90. Will discuss starting Inotropes with cardiology   -  Will continue CVVHD to help reduce CVP   - pending transfer to CCU  - Gallium scan with only LLE cellulitis   - Lactate downtrended to WNL   - CHF with severely reduced EF 15%, caution with fluids. Per renal recs, can give IV albumin for fluids.    # CHF exacerbation  - CHF with EF 10-15% per pt 2/2 ischemic cardiomyopathy s/p AICD as indicated by CXR   - Elevated BNP on admission with R sided effusion and edema  - Cardiac shock causing hypotension   - Started on vasopressin per CHF team recs. Added Levophed given persistently low pressures.   - Persistent pulm congestion noted on chest Xray. c/w CVVHD  - Give fluids judiciously given low EF in setting of likely sepsis  - Unclear medical regimen opt. Per [facility] ( and [place]) pt has not picked up Torsemide 20 or Metoprolol 100 since November.   - Hold ACE/Metoprolol in setting of sepsis  - CVO saturation improved after transfusion    #AFIB  - PT with hx of Afib and LV thrombus on coumadin.    -d/c  Hep drip given concern of HIT.d/c Argatroban.  - Dose coumadin daily. OG tube placed.    - Pt s/p FFP (3/13,3/14) and Vit K (3/13) for thoracocentesis and HD catheter placement.   -TTE with affinity shows no  LV thrombus        #CAD- Hx of MI s/p AICD  - Pt with pLAD in 7/2017, was started on Aspirin and Brelinta per records.  - Will start on asp 81, Atorvastatin 80   - c/w Coumadin     # LE Edema   - LE edema with chronic venous stasis.   - Duplex does not show DVT    PULMONARY   Intubated  - Intubated on 3/22 for airway protection   - Will attempt CPAP trial and extubation     #Acute resp failure- Resolved   - Intubated for airway protection   - pt with R loculated plural effusion noted to have increased work of breathing. Likely 2/2 fluid overload, Fluid studies consistent with exudative effusion.   - s/p thoracentesis on 3/13 with R chest tube placement. Chest tube removed 3/15.   - s/p  pRBC transfusion on 3/12. 3/16  - Monitor resp status  - Appreciate CHF team recs regarding fluid status. Continue dialysis for fluid removal      #RENAL   #ARF- 2/2 ischemic ATN  -Unknown baseline Cr presenting with Cr 3.93 with metabolic derangements.   - Likely 2/2 Sepsis, low intravascular volume and CHF  - Trend BUN and Cr.  - Renal team on board, pt with R HD catheter placed by IR on 3/21. c/w CVVHD currently removing 75cc/hour   - CT abdomen and pelvis without acute obstruction.   - retroperitoneal ultrasound showing medical renal disease.     #Hyperkalemia   -Resolved   - PT with elevated K to 5.7 on admission,  no EKG changes   - Continue telemetry monitoring  - Trend K   - Can given Kayexalate if persists   - c/w dialysis     #Metabolic acidosis   - 2/2 Lactate, starvation ketoacidosis and uremia   - resolved       #GI   - pt wit Elevated Bilirubin and Alk phos. Abd exam benign  - CT abdomen/pelvis consistent with cholelithiasis and ascites.     #HEME    #Thrombocytopenia  - ELVIRA negative but PF4 was positive. Unlikely HIT.    - Could have be related to Vanc/zosyn.   - Improving, monitor now that back on zosyn     # elevated INR. Unclear etiology. Pt on coumadin?   - Continue with daily coumadin dosing   - Given Vit K and FFP3/12. FFP 3/13   - Monitor coags    #Anemia  - Stable at 8-9. Anemia of chronic disease.  -s/p 1pRBC on 3/12, 3/16   - WIll keep Hb around 9-10     #ENDOCRINE   - S/p insulin drip  - incresed Lantus to 10U since pt now hyperglycemic with stress dose steroids.    Monitor blood glucose and ISS coverage. pt with episodes of hypoglycemia.   - Monitor blood glucose   - A1C 8.6%      F: No IVF   E: Replete K<4 Mg<2, caution in setting of acute renal failure  N: OG tube placed, feeds at goal     Lines:  ETT (3/22), OG tube (3/21)R HD catheter (3/21). L IJ 3/21, PICC line 3/20    Full code   Dispo: MICU  Ppx: Coumadin 63M PMH HFrEF 10-15% (ischemic), MI, s/p AICD vs PPM, possible Afib, HTN, DM2 on insulin, possible CKD, and gout, who presents with a chief complaint of generalized weakness s/p septic shock likely 2/2 LE cellulitis. Now with AMS and new leukocytisis likely 2/2 UTI     NEURO  #AMS  - Pt non responsive 5 days ago. VItal signs normal. Pt intubated for airway protection. Stroke code called. CT, CTA negative. VEEG in place- moderate slowing but no seizure activity.   - MRI shows no acute infarcts. no seizure activity on VEEG  - Unclear etiology of AMS at this point.   -  TTE with Affinity shows no clot.   -neuro team consulted. f/u recs     #   ID- septic shock 2/2 Cellulitis   -Shock initially resolved however pt persistently hypoglycemic, hypothermic with AMS.   - Restarted on Levophed and Vasopressin given low BP. Will try to wean off Levophed.   - c/w steroids 50 q6h.   -.Discontinued zosyn for suspected UTI,(3/25-3/26)  s/p Zosyn (3/11-2/21). Was previously also on Vanco (3/11-3/17).   - RUQ dopper shows no portal vein thrombosis   - TTE with no vegetations. Pt not stable for RUKHSANA.   - Gallium scan read shows LLE cellulitis. ,     - repeat LE CT does not show abscess or gas.   - R lung Effusion likely from overload. s/p thoracentesis with improvement in resp status   -  HIV negative  - Vascular surgery on board. Recommending Amputation as an option if pt continues to be unstable.     #UTI- Urine cloudy appearing with increase WBC.  - f/u UA/UC  - discontinued zosyn 3/25-3/26    CARDIOVASCULAR   # HYPOTENSION-   - c/w Levophed and Vasopressin drip. Will maintain SBP>90. Will discuss starting Inotropes with cardiology   -  Will continue CVVHD to help reduce CVP   - pending transfer to CCU  - Gallium scan with only LLE cellulitis   - Lactate downtrended to WNL   - CHF with severely reduced EF 15%, caution with fluids. Per renal recs, can give IV albumin for fluids.    # CHF exacerbation  - CHF with EF 10-15% per pt 2/2 ischemic cardiomyopathy s/p AICD as indicated by CXR   - Elevated BNP on admission with R sided effusion and edema  - Cardiac shock causing hypotension   - Started on vasopressin per CHF team recs. Added Levophed given persistently low pressures.   - Persistent pulm congestion noted on chest Xray. c/w CVVHD  - Give fluids judiciously given low EF in setting of likely sepsis  - Unclear medical regimen opt. [facility] ( and [place]) pt has not picked up Torsemide 20 or Metoprolol 100 since November.   - Hold ACE/Metoprolol in setting of sepsis  - CVO saturation improved after transfusion    #AFIB  - PT with hx of Afib and LV thrombus on coumadin.    -d/c  Hep drip given concern of HIT.d/c Argatroban.  - Dose coumadin daily. OG tube placed.    - Pt s/p FFP (3/13,3/14) and Vit K (3/13) for thoracocentesis and HD catheter placement.   -TTE with affinity shows no  LV thrombus        #CAD- Hx of MI s/p AICD  - Pt with pLAD in 7/2017, was started on Aspirin and Brelinta per records.  - Will start on asp 81, Atorvastatin 80   - c/w Coumadin     # LE Edema   - LE edema with chronic venous stasis.   - Duplex does not show DVT    PULMONARY   Intubated  - Intubated on 3/22 for airway protection   - Will attempt CPAP trial and extubation once patient more responsive     #Acute resp failure- Resolved   - Intubated for airway protection   - pt with R loculated plural effusion noted to have increased work of breathing. Likely 2/2 fluid overload, Fluid studies consistent with exudative effusion.   - s/p thoracentesis on 3/13 with R chest tube placement. Chest tube removed 3/15.   - s/p  pRBC transfusion on 3/12. 3/16  - Monitor resp status  - Appreciate CHF team recs regarding fluid status. Continue dialysis for fluid removal      #RENAL   #ARF- 2/2 ischemic ATN  -Unknown baseline Cr presenting with Cr 3.93 with metabolic derangements.   - Likely 2/2 Sepsis, low intravascular volume and CHF  - Trend BUN and Cr.  - Renal team on board, pt with R HD catheter placed by IR on 3/21. c/w CVVHD currently removing 75cc/hour   - CT abdomen and pelvis without acute obstruction.   - retroperitoneal ultrasound showing medical renal disease.     #Hyperkalemia   -Resolved   - PT with elevated K to 5.7 on admission,  no EKG changes   - Continue telemetry monitoring  - Trend K   - Can given Kayexalate if persists   - c/w dialysis     #Metabolic acidosis   - 2/2 Lactate, starvation ketoacidosis and uremia   - resolved       #GI   - pt wit Elevated Bilirubin and Alk phos. Abd exam benign  - CT abdomen/pelvis consistent with cholelithiasis and ascites.     #HEME    #Thrombocytopenia  - ELVIRA negative but PF4 was positive. Unlikely HIT.    - Could have be related to Vanc/zosyn.   - Slight decrease with Zosyn. Will monitor,     # elevated INR. Unclear etiology. Pt on coumadin?   - Continue with daily coumadin dosing   - Given Vit K and FFP3/12. FFP 3/13   - Monitor coags    #Anemia  - Stable at 8-9. Anemia of chronic disease.  -s/p 1pRBC on 3/12, 3/16   - WIll keep Hb around 9-10     #ENDOCRINE   - S/p insulin drip  - increased Lantus to 10U since pt now hyperglycemic with stress dose steroids.    Monitor blood glucose and ISS coverage. pt with episodes of hypoglycemia.   - Monitor blood glucose   - A1C 8.6%      F: No IVF   E: Replete K<4 Mg<2, caution in setting of acute renal failure  N: OG tube placed, feeds at goal     Lines:  ETT (3/22), OG tube (3/21)R HD catheter (3/21). L IJ 3/21, PICC line 3/20    Full code   Dispo: MICU  Ppx: Coumadin 63M PMH HFrEF 10-15% (ischemic), MI, s/p AICD vs PPM, possible Afib, HTN, DM2 on insulin, possible CKD, and gout, who presents with a chief complaint of generalized weakness s/p septic shock likely 2/2 LE cellulitis. Now with AMS and new leukocytisis likely 2/2 UTI     NEURO  #AMS  - Pt non responsive 5 days ago. VItal signs normal. Pt intubated for airway protection. Stroke code called. CT, CTA negative. VEEG in place- moderate slowing but no seizure activity.   - MRI shows no acute infarcts. no seizure activity on VEEG  - Unclear etiology of AMS at this point.   -  TTE with Affinity shows no clot.   -neuro team consulted. f/u recs on further workup including possible LP.    #   ID- septic shock 2/2 Cellulitis   -Shock initially resolved however pt persistently hypoglycemic, hypothermic with AMS.   - Restarted on Levophed and Vasopressin given low BP. Today, plan to wean off Vasopressin and maintain on Levophed if needed.  - steroids tapered to 50 q8h.   -.Discontinued zosyn for suspected UTI,(3/25-3/26)  s/p Zosyn (3/11-2/21). Was previously also on Vanco (3/11-3/17).   - RUQ dopper shows no portal vein thrombosis   - TTE with no vegetations. Pt not stable for RUKHSANA.   - Gallium scan read shows LLE cellulitis. ,     - repeat LE CT does not show abscess or gas.   - R lung Effusion likely from overload. s/p thoracentesis with improvement in resp status   -  HIV negative  - Vascular surgery on board. Recommending Amputation as an option if pt continues to be unstable.     #UTI- Urine cloudy appearing with increase WBC.  - f/u UA/UC  - discontinued zosyn 3/25-3/26    CARDIOVASCULAR   # HYPOTENSION-   - c/w Levophed. Will titrate off Vasopressin drip. Will maintain SBP>65.  -  Will continue CVVHD to help reduce CVP   - pending transfer to CCU  - Gallium scan with only LLE cellulitis   - Lactate downtrended to WNL   - CHF with severely reduced EF 15%, caution with fluids. Per renal recs, can give IV albumin for fluids.    # CHF exacerbation  - CHF with EF 10-15% per pt 2/2 ischemic cardiomyopathy s/p AICD as indicated by CXR   - Elevated BNP on admission with R sided effusion and edema  - Cardiac shock causing hypotension   - Started on vasopressin per CHF team recs. Added Levophed given persistently low pressures.   - Persistent pulm congestion noted on chest Xray. c/w CVVHD  - Give fluids judiciously given low EF in setting of likely sepsis  - Unclear medical regimen opt. [facility] ( and [place]) pt has not picked up Torsemide 20 or Metoprolol 100 since November.   - Hold ACE/Metoprolol in setting of sepsis  - CVO saturation improved after transfusion    #AFIB  - PT with hx of Afib and LV thrombus on coumadin.    -d/c  Hep drip given concern of HIT.d/c Argatroban.  - Dose coumadin daily. OG tube placed. Will hold tonight's dose of Coumadin for possible LP in near future.   - Pt s/p FFP (3/13,3/14) and Vit K (3/13) for thoracocentesis and HD catheter placement.   -TTE with affinity shows no  LV thrombus        #CAD- Hx of MI s/p AICD  - Pt with pLAD in 7/2017, was started on Aspirin and Brelinta per records.  - Will start on asp 81, Atorvastatin 80   - c/w Coumadin. Planning on holding tonight's dose for LP     # LE Edema   - LE edema with chronic venous stasis.   - Duplex does not show DVT    PULMONARY   Intubated  - Intubated on 3/22 for airway protection   - Will attempt CPAP trial and extubation once patient more responsive     #Acute resp failure- Resolved   - Intubated for airway protection   - pt with R loculated plural effusion noted to have increased work of breathing. Likely 2/2 fluid overload, Fluid studies consistent with exudative effusion.   - s/p thoracentesis on 3/13 with R chest tube placement. Chest tube removed 3/15.   - s/p  pRBC transfusion on 3/12. 3/16  - Monitor resp status  - Appreciate CHF team recs regarding fluid status. Continue dialysis for fluid removal      #RENAL   #ARF- 2/2 ischemic ATN  -Unknown baseline Cr presenting with Cr 3.93 with metabolic derangements.   - Likely 2/2 Sepsis, low intravascular volume and CHF  - Trend BUN and Cr.  - Renal team on board, pt with R HD catheter placed by IR on 3/21. c/w CVVHD currently removing 75cc/hour   - CT abdomen and pelvis without acute obstruction.   - retroperitoneal ultrasound showing medical renal disease.     #Hyperkalemia   -Resolved   - PT with elevated K to 5.7 on admission,  no EKG changes   - Continue telemetry monitoring  - Trend K   - Can given Kayexalate if persists   - c/w dialysis     #Metabolic acidosis   - 2/2 Lactate, starvation ketoacidosis and uremia   - resolved       #GI   - pt wit Elevated Bilirubin and Alk phos. Abd exam benign  - CT abdomen/pelvis consistent with cholelithiasis and ascites.     #HEME    #Thrombocytopenia  - ELVIRA negative but PF4 was positive. Unlikely HIT.    - Could have be related to Vanc/zosyn.   - Slight decrease with Zosyn. Will monitor,     # elevated INR. Unclear etiology. Pt on coumadin?   - Continue with daily coumadin dosing   - Given Vit K and FFP3/12. FFP 3/13   - Monitor coags    #Anemia  - Stable at 8-9. Anemia of chronic disease.  -s/p 1pRBC on 3/12, 3/16   - WIll keep Hb around 9-10     #ENDOCRINE   - S/p insulin drip  - increased Lantus to 10U since pt now hyperglycemic with stress dose steroids.   - Will adjust as we taper down steroids.    Monitor blood glucose and ISS coverage. pt with episodes of hypoglycemia.   - Monitor blood glucose   - A1C 8.6%      F: No IVF   E: Replete K<4 Mg<2, caution in setting of acute renal failure  N: OG tube placed, feeds at goal     Lines:  ETT (3/22), OG tube (3/21)R HD catheter (3/21). L IJ 3/21, PICC line 3/20    Full code   Dispo: MICU  Ppx: Coumadin, PPI

## 2018-03-27 NOTE — PROGRESS NOTE ADULT - PROBLEM SELECTOR PLAN 1
- oliguric GILMER from ATN from septic shock   - On CVVHD - we will decrease the UF to 25 ml/h,   - anuric   - please follow up with the CHF/cardiology team   - tomorrow palliative meeting   - volume status acceptable   - in and outs   - daily weight   - electrolytes noted

## 2018-03-27 NOTE — PROGRESS NOTE ADULT - SUBJECTIVE AND OBJECTIVE BOX
MRI reviewed with neuroradiology - there are some patchy subtle areas of diffusion restriction in the bilateral occipital lobes L > R. There is also a FLAIR hyperintensity in the left je without clear diffusion restriction. EEG showed mild slowing.     Exam:  MS: unresponsive to name or sternal rub  CN: roving eye movements, horizontal movements largely intact, does not blink to threat  Motor: withdraws R > L arms and b/l legs to pain, no spontaneous movements

## 2018-03-27 NOTE — PROGRESS NOTE ADULT - ASSESSMENT
64 yo M history of HFrEF 10-15% 2/2 ischemic cardiomyopathy, MI , s/p AICD vs PPM, ?Afib, Hypertension, Diabetes Mellitus Type 2 on insulin, CKD and gout, who presents with a chief complaint of generalized weakness admitted for severe sepsis 2/2 LE cellulitis vs pneumonia   Pt has developed renal failure (GILMER) and was started on CVVHD, converted to HD but is hypotensive with same.    Pt has a neurological event on Friday March 23.  He was intubated for airway protection.  His ental status remains poor, off sedation.  No seizure.  No CVA.      Will ask pts HCP to participate in a family meeting on Wednesday March 28.

## 2018-03-28 DIAGNOSIS — J96.00 ACUTE RESPIRATORY FAILURE, UNSPECIFIED WHETHER WITH HYPOXIA OR HYPERCAPNIA: ICD-10-CM

## 2018-03-28 LAB
ANION GAP SERPL CALC-SCNC: 10 MMOL/L — SIGNIFICANT CHANGE UP (ref 5–17)
ANION GAP SERPL CALC-SCNC: 13 MMOL/L — SIGNIFICANT CHANGE UP (ref 5–17)
ANION GAP SERPL CALC-SCNC: 16 MMOL/L — SIGNIFICANT CHANGE UP (ref 5–17)
ANION GAP SERPL CALC-SCNC: 9 MMOL/L — SIGNIFICANT CHANGE UP (ref 5–17)
APTT BLD: 55.6 SEC — HIGH (ref 27.5–37.4)
BUN SERPL-MCNC: 32 MG/DL — HIGH (ref 7–23)
BUN SERPL-MCNC: 32 MG/DL — HIGH (ref 7–23)
BUN SERPL-MCNC: 33 MG/DL — HIGH (ref 7–23)
BUN SERPL-MCNC: 36 MG/DL — HIGH (ref 7–23)
C3 SERPL-MCNC: 47 MG/DL — LOW (ref 80–180)
C4 SERPL-MCNC: 16 MG/DL — SIGNIFICANT CHANGE UP (ref 10–45)
CALCIUM SERPL-MCNC: 8.2 MG/DL — LOW (ref 8.4–10.5)
CALCIUM SERPL-MCNC: 8.3 MG/DL — LOW (ref 8.4–10.5)
CALCIUM SERPL-MCNC: 8.4 MG/DL — SIGNIFICANT CHANGE UP (ref 8.4–10.5)
CALCIUM SERPL-MCNC: 8.4 MG/DL — SIGNIFICANT CHANGE UP (ref 8.4–10.5)
CHLORIDE SERPL-SCNC: 100 MMOL/L — SIGNIFICANT CHANGE UP (ref 96–108)
CHLORIDE SERPL-SCNC: 99 MMOL/L — SIGNIFICANT CHANGE UP (ref 96–108)
CO2 SERPL-SCNC: 20 MMOL/L — LOW (ref 22–31)
CO2 SERPL-SCNC: 23 MMOL/L — SIGNIFICANT CHANGE UP (ref 22–31)
CO2 SERPL-SCNC: 25 MMOL/L — SIGNIFICANT CHANGE UP (ref 22–31)
CO2 SERPL-SCNC: 28 MMOL/L — SIGNIFICANT CHANGE UP (ref 22–31)
CREAT SERPL-MCNC: 1.8 MG/DL — HIGH (ref 0.5–1.3)
CREAT SERPL-MCNC: 1.82 MG/DL — HIGH (ref 0.5–1.3)
CREAT SERPL-MCNC: 1.87 MG/DL — HIGH (ref 0.5–1.3)
CREAT SERPL-MCNC: 1.9 MG/DL — HIGH (ref 0.5–1.3)
CULTURE RESULTS: SIGNIFICANT CHANGE UP
GLUCOSE BLDC GLUCOMTR-MCNC: 171 MG/DL — HIGH (ref 70–99)
GLUCOSE BLDC GLUCOMTR-MCNC: 173 MG/DL — HIGH (ref 70–99)
GLUCOSE BLDC GLUCOMTR-MCNC: 204 MG/DL — HIGH (ref 70–99)
GLUCOSE BLDC GLUCOMTR-MCNC: 222 MG/DL — HIGH (ref 70–99)
GLUCOSE SERPL-MCNC: 211 MG/DL — HIGH (ref 70–99)
GLUCOSE SERPL-MCNC: 218 MG/DL — HIGH (ref 70–99)
GLUCOSE SERPL-MCNC: 224 MG/DL — HIGH (ref 70–99)
GLUCOSE SERPL-MCNC: 248 MG/DL — HIGH (ref 70–99)
HCT VFR BLD CALC: 25.3 % — LOW (ref 39–50)
HCT VFR BLD CALC: 26.5 % — LOW (ref 39–50)
HCT VFR BLD CALC: 26.9 % — LOW (ref 39–50)
HCT VFR BLD CALC: 27.8 % — LOW (ref 39–50)
HGB BLD-MCNC: 7.7 G/DL — LOW (ref 13–17)
HGB BLD-MCNC: 8.3 G/DL — LOW (ref 13–17)
HGB BLD-MCNC: 8.3 G/DL — LOW (ref 13–17)
HGB BLD-MCNC: 8.7 G/DL — LOW (ref 13–17)
INR BLD: 2.93 — HIGH (ref 0.88–1.16)
MAGNESIUM SERPL-MCNC: 1.9 MG/DL — SIGNIFICANT CHANGE UP (ref 1.6–2.6)
MAGNESIUM SERPL-MCNC: 2 MG/DL — SIGNIFICANT CHANGE UP (ref 1.6–2.6)
MCHC RBC-ENTMCNC: 30.3 PG — SIGNIFICANT CHANGE UP (ref 27–34)
MCHC RBC-ENTMCNC: 30.4 G/DL — LOW (ref 32–36)
MCHC RBC-ENTMCNC: 30.9 G/DL — LOW (ref 32–36)
MCHC RBC-ENTMCNC: 31 PG — SIGNIFICANT CHANGE UP (ref 27–34)
MCHC RBC-ENTMCNC: 31 PG — SIGNIFICANT CHANGE UP (ref 27–34)
MCHC RBC-ENTMCNC: 31.1 PG — SIGNIFICANT CHANGE UP (ref 27–34)
MCHC RBC-ENTMCNC: 31.3 G/DL — LOW (ref 32–36)
MCHC RBC-ENTMCNC: 31.3 G/DL — LOW (ref 32–36)
MCV RBC AUTO: 100.4 FL — HIGH (ref 80–100)
MCV RBC AUTO: 98.9 FL — SIGNIFICANT CHANGE UP (ref 80–100)
MCV RBC AUTO: 99.3 FL — SIGNIFICANT CHANGE UP (ref 80–100)
MCV RBC AUTO: 99.6 FL — SIGNIFICANT CHANGE UP (ref 80–100)
PHOSPHATE SERPL-MCNC: 2.5 MG/DL — SIGNIFICANT CHANGE UP (ref 2.5–4.5)
PHOSPHATE SERPL-MCNC: 2.8 MG/DL — SIGNIFICANT CHANGE UP (ref 2.5–4.5)
PHOSPHATE SERPL-MCNC: 2.9 MG/DL — SIGNIFICANT CHANGE UP (ref 2.5–4.5)
PHOSPHATE SERPL-MCNC: 3.2 MG/DL — SIGNIFICANT CHANGE UP (ref 2.5–4.5)
PLATELET # BLD AUTO: 55 K/UL — LOW (ref 150–400)
PLATELET # BLD AUTO: 58 K/UL — LOW (ref 150–400)
PLATELET # BLD AUTO: 61 K/UL — LOW (ref 150–400)
PLATELET # BLD AUTO: 77 K/UL — LOW (ref 150–400)
POTASSIUM SERPL-MCNC: 3.4 MMOL/L — LOW (ref 3.5–5.3)
POTASSIUM SERPL-MCNC: 3.7 MMOL/L — SIGNIFICANT CHANGE UP (ref 3.5–5.3)
POTASSIUM SERPL-MCNC: 3.8 MMOL/L — SIGNIFICANT CHANGE UP (ref 3.5–5.3)
POTASSIUM SERPL-MCNC: 4 MMOL/L — SIGNIFICANT CHANGE UP (ref 3.5–5.3)
POTASSIUM SERPL-SCNC: 3.4 MMOL/L — LOW (ref 3.5–5.3)
POTASSIUM SERPL-SCNC: 3.7 MMOL/L — SIGNIFICANT CHANGE UP (ref 3.5–5.3)
POTASSIUM SERPL-SCNC: 3.8 MMOL/L — SIGNIFICANT CHANGE UP (ref 3.5–5.3)
POTASSIUM SERPL-SCNC: 4 MMOL/L — SIGNIFICANT CHANGE UP (ref 3.5–5.3)
PROTHROM AB SERPL-ACNC: 33.2 SEC — HIGH (ref 9.8–12.7)
RBC # BLD: 2.54 M/UL — LOW (ref 4.2–5.8)
RBC # BLD: 2.67 M/UL — LOW (ref 4.2–5.8)
RBC # BLD: 2.68 M/UL — LOW (ref 4.2–5.8)
RBC # BLD: 2.81 M/UL — LOW (ref 4.2–5.8)
RBC # FLD: 19.6 % — HIGH (ref 10.3–16.9)
RBC # FLD: 19.9 % — HIGH (ref 10.3–16.9)
RBC # FLD: 20 % — HIGH (ref 10.3–16.9)
RBC # FLD: 20.4 % — HIGH (ref 10.3–16.9)
SODIUM SERPL-SCNC: 135 MMOL/L — SIGNIFICANT CHANGE UP (ref 135–145)
SODIUM SERPL-SCNC: 135 MMOL/L — SIGNIFICANT CHANGE UP (ref 135–145)
SODIUM SERPL-SCNC: 136 MMOL/L — SIGNIFICANT CHANGE UP (ref 135–145)
SODIUM SERPL-SCNC: 137 MMOL/L — SIGNIFICANT CHANGE UP (ref 135–145)
SPECIMEN SOURCE: SIGNIFICANT CHANGE UP
WBC # BLD: 10.4 K/UL — SIGNIFICANT CHANGE UP (ref 3.8–10.5)
WBC # BLD: 12.9 K/UL — HIGH (ref 3.8–10.5)
WBC # BLD: 8.2 K/UL — SIGNIFICANT CHANGE UP (ref 3.8–10.5)
WBC # BLD: 8.2 K/UL — SIGNIFICANT CHANGE UP (ref 3.8–10.5)
WBC # FLD AUTO: 10.4 K/UL — SIGNIFICANT CHANGE UP (ref 3.8–10.5)
WBC # FLD AUTO: 12.9 K/UL — HIGH (ref 3.8–10.5)
WBC # FLD AUTO: 8.2 K/UL — SIGNIFICANT CHANGE UP (ref 3.8–10.5)
WBC # FLD AUTO: 8.2 K/UL — SIGNIFICANT CHANGE UP (ref 3.8–10.5)

## 2018-03-28 PROCEDURE — 99231 SBSQ HOSP IP/OBS SF/LOW 25: CPT

## 2018-03-28 PROCEDURE — 99497 ADVNCD CARE PLAN 30 MIN: CPT | Mod: 25

## 2018-03-28 PROCEDURE — 99233 SBSQ HOSP IP/OBS HIGH 50: CPT | Mod: 25,GC

## 2018-03-28 PROCEDURE — 90945 DIALYSIS ONE EVALUATION: CPT

## 2018-03-28 PROCEDURE — 99291 CRITICAL CARE FIRST HOUR: CPT

## 2018-03-28 PROCEDURE — 71045 X-RAY EXAM CHEST 1 VIEW: CPT | Mod: 26

## 2018-03-28 RX ORDER — INSULIN GLARGINE 100 [IU]/ML
28 INJECTION, SOLUTION SUBCUTANEOUS AT BEDTIME
Qty: 0 | Refills: 0 | Status: DISCONTINUED | OUTPATIENT
Start: 2018-03-28 | End: 2018-03-28

## 2018-03-28 RX ORDER — WARFARIN SODIUM 2.5 MG/1
2 TABLET ORAL ONCE
Qty: 0 | Refills: 0 | Status: COMPLETED | OUTPATIENT
Start: 2018-03-28 | End: 2018-03-28

## 2018-03-28 RX ORDER — WARFARIN SODIUM 2.5 MG/1
2.5 TABLET ORAL ONCE
Qty: 0 | Refills: 0 | Status: DISCONTINUED | OUTPATIENT
Start: 2018-03-28 | End: 2018-03-28

## 2018-03-28 RX ORDER — POTASSIUM CHLORIDE 20 MEQ
20 PACKET (EA) ORAL
Qty: 0 | Refills: 0 | Status: COMPLETED | OUTPATIENT
Start: 2018-03-28 | End: 2018-03-28

## 2018-03-28 RX ORDER — POTASSIUM CHLORIDE 20 MEQ
20 PACKET (EA) ORAL ONCE
Qty: 0 | Refills: 0 | Status: COMPLETED | OUTPATIENT
Start: 2018-03-28 | End: 2018-03-28

## 2018-03-28 RX ORDER — MAGNESIUM SULFATE 500 MG/ML
1 VIAL (ML) INJECTION ONCE
Qty: 0 | Refills: 0 | Status: COMPLETED | OUTPATIENT
Start: 2018-03-28 | End: 2018-03-28

## 2018-03-28 RX ORDER — INSULIN GLARGINE 100 [IU]/ML
26 INJECTION, SOLUTION SUBCUTANEOUS AT BEDTIME
Qty: 0 | Refills: 0 | Status: DISCONTINUED | OUTPATIENT
Start: 2018-03-28 | End: 2018-04-01

## 2018-03-28 RX ADMIN — Medication 1 TABLET(S): at 12:06

## 2018-03-28 RX ADMIN — ERGOCALCIFEROL 6000 UNIT(S): 1.25 CAPSULE ORAL at 12:07

## 2018-03-28 RX ADMIN — Medication 2: at 12:05

## 2018-03-28 RX ADMIN — Medication 20 MILLIEQUIVALENT(S): at 15:02

## 2018-03-28 RX ADMIN — CHLORHEXIDINE GLUCONATE 1 APPLICATION(S): 213 SOLUTION TOPICAL at 12:07

## 2018-03-28 RX ADMIN — MODAFINIL 100 MILLIGRAM(S): 200 TABLET ORAL at 06:49

## 2018-03-28 RX ADMIN — Medication 20 MILLIEQUIVALENT(S): at 02:48

## 2018-03-28 RX ADMIN — Medication 20 MILLIEQUIVALENT(S): at 06:49

## 2018-03-28 RX ADMIN — CHLORHEXIDINE GLUCONATE 15 MILLILITER(S): 213 SOLUTION TOPICAL at 12:06

## 2018-03-28 RX ADMIN — ESCITALOPRAM OXALATE 5 MILLIGRAM(S): 10 TABLET, FILM COATED ORAL at 12:06

## 2018-03-28 RX ADMIN — PANTOPRAZOLE SODIUM 40 MILLIGRAM(S): 20 TABLET, DELAYED RELEASE ORAL at 12:07

## 2018-03-28 RX ADMIN — Medication 50 MILLIGRAM(S): at 18:55

## 2018-03-28 RX ADMIN — Medication 81 MILLIGRAM(S): at 12:06

## 2018-03-28 RX ADMIN — Medication 4: at 18:55

## 2018-03-28 RX ADMIN — Medication 50 MILLIGRAM(S): at 02:48

## 2018-03-28 RX ADMIN — Medication 20 MILLIEQUIVALENT(S): at 04:00

## 2018-03-28 RX ADMIN — INSULIN GLARGINE 26 UNIT(S): 100 INJECTION, SOLUTION SUBCUTANEOUS at 21:51

## 2018-03-28 RX ADMIN — Medication 50 MILLIGRAM(S): at 12:06

## 2018-03-28 RX ADMIN — Medication 100 GRAM(S): at 02:48

## 2018-03-28 RX ADMIN — WARFARIN SODIUM 2 MILLIGRAM(S): 2.5 TABLET ORAL at 21:51

## 2018-03-28 RX ADMIN — Medication 2: at 06:49

## 2018-03-28 RX ADMIN — MODAFINIL 100 MILLIGRAM(S): 200 TABLET ORAL at 12:07

## 2018-03-28 RX ADMIN — ATORVASTATIN CALCIUM 80 MILLIGRAM(S): 80 TABLET, FILM COATED ORAL at 21:54

## 2018-03-28 RX ADMIN — Medication 4: at 21:53

## 2018-03-28 RX ADMIN — Medication 100 MILLIGRAM(S): at 12:07

## 2018-03-28 RX ADMIN — Medication 1 MILLIGRAM(S): at 12:05

## 2018-03-28 RX ADMIN — CHLORHEXIDINE GLUCONATE 15 MILLILITER(S): 213 SOLUTION TOPICAL at 21:55

## 2018-03-28 NOTE — PROGRESS NOTE ADULT - SUBJECTIVE AND OBJECTIVE BOX
the patient seen in the morning - intubated, off pressor, still on CVVHD no issues - overnight on 25 ml/h in the morning increased to 75 ml/h  Renal service follows for the need of fluid management in the setting of CHF, GILMER - oliguria     Patient seen and examined at bedside.     acetaminophen    Suspension. 650 milliGRAM(s) every 6 hours PRN  aspirin  chewable 81 milliGRAM(s) daily  atorvastatin 80 milliGRAM(s) at bedtime  chlorhexidine 0.12% Liquid 15 milliLiter(s) two times a day  chlorhexidine 2% Cloths 1 Application(s) daily  dextrose 5%. 1000 milliLiter(s) <Continuous>  dextrose 50% Injectable 12.5 Gram(s) once  dextrose 50% Injectable 25 Gram(s) once  dextrose 50% Injectable 25 Gram(s) once  dextrose Gel 1 Dose(s) once PRN  ergocalciferol Drops 6000 Unit(s) daily  escitalopram 5 milliGRAM(s) daily  folic acid 1 milliGRAM(s) daily  glucagon  Injectable 1 milliGRAM(s) once PRN  hydrocortisone sodium succinate Injectable 50 milliGRAM(s) every 8 hours  insulin glargine Injectable (LANTUS) 24 Unit(s) at bedtime  insulin lispro (HumaLOG) corrective regimen sliding scale   every 6 hours  modafinil 100 milliGRAM(s) <User Schedule>  multivitamin 1 Tablet(s) daily  pantoprazole   Suspension 40 milliGRAM(s) daily  PureFlow Dialysate RFP-400 (K 2 / Ca 3) 5000 milliLiter(s) <Continuous>  sodium chloride 0.9% lock flush 10 milliLiter(s) every 1 hour PRN  sodium chloride 0.9% lock flush 10 milliLiter(s) every 12 hours PRN  sodium chloride 0.9% lock flush 20 milliLiter(s) once  thiamine 100 milliGRAM(s) daily  warfarin 2 milliGRAM(s) once      Allergies    No Known Allergies    Intolerances        T(C): , Max: 36.2 (03-27-18 @ 19:00)  T(F): , Max: 97.2 (03-28-18 @ 06:17)  HR: 84 (03-28-18 @ 14:00)  BP: 79/64 (03-28-18 @ 14:00)  BP(mean): 70 (03-28-18 @ 14:00)  RR: 11 (03-28-18 @ 14:00)  SpO2: 100% (03-28-18 @ 14:00)  Wt(kg): --    03-27 @ 07:01  -  03-28 @ 07:00  --------------------------------------------------------  IN:    Enteral Tube Flush: 340 mL    Glucerna: 880 mL    Nepro with Carb Steady: 440 mL    norepinephrine Infusion: 26 mL    norepinephrine Infusion: 39 mL    Solution: 680 mL    Solution: 100 mL    vasopressin Infusion: 9.6 mL  Total IN: 2514.6 mL    OUT:    Other: 339 mL  Total OUT: 339 mL    Total NET: 2175.6 mL      03-28 @ 07:01  -  03-28 @ 14:29  --------------------------------------------------------  IN:    Enteral Tube Flush: 60 mL    Glucerna: 510 mL  Total IN: 570 mL    OUT:    Other: 357 mL  Total OUT: 357 mL    Total NET: 213 mL      Physical exam:   Intubated and sedated   No JVD   Normal air entry into the lungs, no wheezing, no crackles   RRR, normal s1, s2, no murmurs, rubs or gallops   Abdomen - soft, not tender, not distended   Extremities: no edema   HAs a HD catheter on the right of his neck            LABS:                        8.3    10.4  )-----------( 61       ( 28 Mar 2018 06:21 )             26.5     03-28    137  |  100  |  32<H>  ----------------------------<  211<H>  3.7   |  28  |  1.90<H>    Ca    8.4      28 Mar 2018 14:11  Phos  2.8     03-28  Mg     1.9     03-28    TPro  5.8<L>  /  Alb  3.2<L>  /  TBili  1.4<H>  /  DBili  x   /  AST  23  /  ALT  27  /  AlkPhos  225<H>  03-27    C3 Complement, Serum: 47 mg/dL <L> [80 - 180] (03-27 @ 20:06)  C4 Complement, Serum: 16 mg/dL [10 - 45] (03-27 @ 20:06)    PT/INR - ( 28 Mar 2018 06:21 )   PT: 33.2 sec;   INR: 2.93          PTT - ( 28 Mar 2018 06:21 )  PTT:55.6 sec          RADIOLOGY & ADDITIONAL STUDIES:

## 2018-03-28 NOTE — PROGRESS NOTE ADULT - PROBLEM SELECTOR PLAN 1
- oliguric GILMER from ATN from septic shock   - On CVVHD - we will continue with 75 ml/h,   - anuric   - please follow up with the CHF/cardiology team - noted   - tomorrow palliative meeting   - volume status acceptable   - in and outs   - daily weight   - electrolytes noted

## 2018-03-28 NOTE — PROGRESS NOTE ADULT - ASSESSMENT
Patient is a 63 year old gentleman with medical history significant for ischemic heart failure (EF 15-20% 3/25 with global LV hypokinesis, s/p AICD), atrial fibrillation, CAD (stent to pLAD), DMII on insulin, HTN, CKD (unknown baseline currently on CVVHD for fluid management) who presented with heart failure in the setting of septic shock 2/2 LLE cellulitis. Patient is s/p antibiotic treatment, now intubated for airway protection in setting of episode of altered mental status.     -on CVVHD with CVP goal to <10mmHg with continuous fluid removal   -on levophed for BP support, currently SBP 80's  -will need to continue fluid removal and keep BP supported with pressors then can start neurohormonal antagonists when situation stabilizes Patient is a 63 year old gentleman with medical history significant for ischemic heart failure (EF 15-20% 3/25 with global LV hypokinesis, s/p AICD), atrial fibrillation, CAD (stent to pLAD), DMII on insulin, HTN, CKD (unknown baseline currently on CVVHD for fluid management) who presented with heart failure in the setting of septic shock 2/2 LLE cellulitis. Patient is s/p antibiotic treatment, now intubated for airway protection in setting of episode of altered mental status.     -on CVVHD with CVP goal to <10mmHg with continuous fluid removal - rate now adjusted to 25cc/hr to balance fluid removal with blood pressure maintenance  -can start neurohormonal antagonists when situation stabilizes; dobutamine preferred to minimize vasodilatory effects.

## 2018-03-28 NOTE — PROGRESS NOTE ADULT - PROBLEM SELECTOR PLAN 2
cvvhd continues  Renal recovery is difficulty to predict as pts baseline values are not available  case d/w Dr Guzman

## 2018-03-28 NOTE — PROGRESS NOTE ADULT - PROBLEM SELECTOR PLAN 3
Pt was intubated for airway protection.  Attempt CPAP trial today.  Although pt does not have a specific pulmonary problem, pt's inability to adequately protect his airway is one of his biggest threats.  IF pt cannot be extubated safely, would he want a tracheostomy?   Will explore with pts HCP

## 2018-03-28 NOTE — PROGRESS NOTE ADULT - ASSESSMENT
63M PMH HFrEF 10-15% (ischemic), MI, s/p AICD vs PPM, possible Afib, HTN, DM2 on insulin, possible CKD, and gout, who presents with a chief complaint of generalized weakness s/p septic shock likely 2/2 LE cellulitis. Now with AMS likely from brainstem infarct.     NEURO  #AMS  - Pt non responsive since last week. VItal signs normal during the event. Pt intubated for airway protection. Stroke code called. CT, CTA negative. VEEG in place- moderate slowing but no seizure activity.   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 Per neuro, even could have resulted from hypoperfusion.   - no seizure activity on VEEG  -  TTE with Affinity shows no clot.   - neuro team consulted. f/u recs     #   ID- septic shock 2/2 Cellulitis- resolved   -Resolved, now off pressors   - steroids tapered to 50 q8h. Will changed to 25 q8 tomorrow.   -.Discontinued zosyn for suspected UTI,(3/25-3/26)  s/p Zosyn (3/11-2/21). Was previously also on Vanco (3/11-3/17).   - RUQ dopper shows no portal vein thrombosis   - TTE with no vegetations. Pt not stable for RUKHSANA.   - Gallium scan read shows LLE cellulitis. ,     - repeat LE CT does not show abscess or gas.   - R lung Effusion likely from overload. s/p thoracentesis with improvement in resp status   -  HIV negative  - Vascular surgery on board. Recommending Amputation as an option if pt continues to be unstable.     #UTI- Urine cloudy appearing with increase WBC.  - f/u UA/UC  - discontinued zosyn 3/25-3/26    CARDIOVASCULAR   # HYPOTENSION-   - Off pressors. Will maintain SBP>65.   -  Will continue CVVHD to help reduce CVP   - pending transfer to CCU  - Gallium scan with only LLE cellulitis   - Lactate downtrended to WNL   - CHF with severely reduced EF 15%, caution with fluids. Per renal recs, can give IV albumin for fluids.    # CHF exacerbation  - CHF with EF 10-15% per pt 2/2 ischemic cardiomyopathy s/p AICD as indicated by CXR   - Elevated BNP on admission with R sided effusion and edema  - Cardiac shock causing hypotension- now improved will monitor as we increase UF rate on CVVHD  - Persistent pulm congestion noted on chest Xray. c/w CVVHD  - Give fluids judiciously given low EF in setting of likely sepsis  - Unclear medical regimen opt. Per [facility] ( and [facility]) pt has not picked up Torsemide 20 or Metoprolol 100 since November.   - Hold ACE/Metoprolol in setting of sepsis  - CVO saturation improved after transfusion    #AFIB  - PT with hx of Afib and LV thrombus on coumadin.    -d/c  Hep drip given concern of HIT.d/c Argatroban.  - Dose coumadin daily. OG tube placed.   - Pt s/p FFP (3/13,3/14) and Vit K (3/13) for thoracocentesis and HD catheter placement.   -TTE with affinity shows no  LV thrombus        #CAD- Hx of MI s/p AICD  - Pt with pLAD in 7/2017, was started on Aspirin and Brelinta per records.  - Will start on asp 81, Atorvastatin 80   - c/w Coumadin.     # LE Edema   - LE edema with chronic venous stasis.   - Duplex does not show DVT    PULMONARY   Intubated  - Intubated on 3/22 for airway protection   - Will attempt CPAP trial and extubation once patient more responsive     #Acute resp failure- Resolved   - Intubated for airway protection   - pt with R loculated plural effusion noted to have increased work of breathing. Likely 2/2 fluid overload, Fluid studies consistent with exudative effusion.   - s/p thoracentesis on 3/13 with R chest tube placement. Chest tube removed 3/15.   - s/p  pRBC transfusion on 3/12. 3/16  - Monitor resp status  - Appreciate CHF team recs regarding fluid status. Continue dialysis for fluid removal      #RENAL   #ARF- 2/2 ischemic ATN  -Unknown baseline Cr presenting with Cr 3.93 with metabolic derangements.   - Likely 2/2 Sepsis, low intravascular volume and CHF  - Trend BUN and Cr.  - Renal team on board, pt with R HD catheter placed by IR on 3/21. c/w CVVHD currently removing 25cc/hour, will increase rate   - CT abdomen and pelvis without acute obstruction.   - retroperitoneal ultrasound showing medical renal disease.     #Hyperkalemia   -Resolved   - PT with elevated K to 5.7 on admission,  no EKG changes   - Continue telemetry monitoring  - Trend K   - Can given Kayexalate if persists   - c/w dialysis     #Metabolic acidosis   - 2/2 Lactate, starvation ketoacidosis and uremia   - resolved       #GI   - pt wit Elevated Bilirubin and Alk phos. Abd exam benign  - CT abdomen/pelvis consistent with cholelithiasis and ascites.     #HEME    #Thrombocytopenia  - ELVIRA negative but PF4 was positive. Unlikely HIT.    - Could have be related to Vanc/zosyn.   - Decreasing since Zosyn was restarted. Will monitor,     # elevated INR. Unclear etiology. Pt on coumadin?   - Continue with daily coumadin dosing   - Given Vit K and FFP3/12. FFP 3/13   - Monitor coags    #Anemia  - Stable at 8-9. Anemia of chronic disease.  -s/p 1pRBC on 3/12, 3/16   - WIll keep Hb around 9-10     #ENDOCRINE   - S/p insulin drip  - increased Lantus to 24U since pt now hyperglycemic with stress dose steroids.   - Will adjust as we taper down steroids.    Monitor blood glucose and ISS coverage. pt with episodes of hypoglycemia.   - Monitor blood glucose   - A1C 8.6%      F: No IVF   E: Replete K<4 Mg<2, caution in setting of acute renal failure  N: OG tube placed, feeds at goal     Lines:  ETT (3/22), OG tube (3/21)R HD catheter (3/21). L IJ 3/21, PICC line 3/20    Full code   Dispo: MICU  Ppx: Coumadin, PPI

## 2018-03-28 NOTE — PROGRESS NOTE ADULT - PROBLEM SELECTOR PLAN 4
neuro notes reviewed.  evidence of old injury and new injury. Improvement is possible but a time frame for same was not mentioned (as it may be impossible to predict).  Will meet with dgt today at 3 to discuss plan of care in the setting of prognosis

## 2018-03-28 NOTE — PROGRESS NOTE ADULT - ATTENDING COMMENTS
CVPs <10 this am - now not checking. Not getting any requisite in's via drips. Will decrease UF to 25cc/hr, change to no UF if MAP <65. Will reassess continued need for CRRT in the am based on GOC, CVP if checked and req. for volume removal.

## 2018-03-28 NOTE — CHART NOTE - NSCHARTNOTEFT_GEN_A_CORE
PGY3 EVENT NOTE    Family meeting was held today with the patient's daughter and HCP, Cristal, and his niece, Amadou, PGY3 EVENT NOTE    Family meeting was held today with the patient's daughter and HCP, Cristal, and his niece, Amadou, the renal fellow Dr. Rodriguez, and the palliative NP, Gabriela Sorensen. The meeting addressed the patient's current clinical status including his acute change in mental status likely secondary to a neurologic event in the posterior circulation and his slow improvement with the addition of modafinil. Cristal stated that she would like to talk with her brother and mother to discuss the potential upcoming decisions, such as: continuing with the ventilatory support vs withdrawal of the ventilatory support vs tracheostomy, possible peg tube placement, continuation of CVVHD vs withdrawal, the addition of pressure support if needed and his overall code status. At this time she wishes to continue with all medical management.

## 2018-03-28 NOTE — PROGRESS NOTE ADULT - ASSESSMENT
62 yo M history of HFrEF 10-15% 2/2 ischemic cardiomyopathy, MI , s/p AICD vs PPM, ?Afib, Hypertension, Diabetes Mellitus Type 2 on insulin, CKD and gout, who presents with a chief complaint of generalized weakness admitted for severe sepsis 2/2 LE cellulitis vs pneumonia   Pt has developed renal failure (GILMER) and was started on CVVHD, converted to HD but is hypotensive with same.    Pt has a neurological event on Friday March 23.  He was intubated for airway protection.  His ental status remains poor, off sedation.  No seizure.  No CVA.     HCP to participate in a family meeting on Wednesday March 28 to provide updates adn explore pts goals and wishes.

## 2018-03-28 NOTE — PROGRESS NOTE ADULT - ATTENDING COMMENTS
Pt remains critically ill, requiring mechanical ventilation and CVVH. Vasopressors titrated off. No urine production. Neuro status grossly unchanged with minimal response to verbal or painful stimuli, does resist exam somewhat.  Continue mechanical vent support  Continue CVVH, will d/w renal trial of intermittent HD  Continue CV support for chronic CHF with 10%EF  Standard vent bundle  GI/DVT prophy  Enteral feeds at goal

## 2018-03-28 NOTE — PROGRESS NOTE ADULT - PROBLEM SELECTOR PLAN 2
Acute on chronic systolic CHF with LVEF 15 % and severely low blood pressure  Daily weight - please limit the fluid intake   - and follow up closely the fluid intake - please   - on pressors in the morning

## 2018-03-28 NOTE — PROGRESS NOTE ADULT - SUBJECTIVE AND OBJECTIVE BOX
INTERVAL HPI/OVERNIGHT EVENTS: Started on Modafinil. Off levophed      SUBJECTIVE: Patient seen and examined at bedside. Able to noed head and move toes on verbal command    OBJECTIVE:    VITAL SIGNS:  ICU Vital Signs Last 24 Hrs  T(C): 35.7 (28 Mar 2018 11:00), Max: 36.5 (27 Mar 2018 14:00)  T(F): 96.3 (28 Mar 2018 11:00), Max: 97.7 (27 Mar 2018 14:00)  HR: 74 (28 Mar 2018 13:00) (74 - 90)  BP: 80/64 (28 Mar 2018 13:00) (77/67 - 83/74)  BP(mean): 71 (28 Mar 2018 13:00) (70 - 80)  ABP: 92/62 (28 Mar 2018 13:00) (86/54 - 112/68)  ABP(mean): 72 (28 Mar 2018 13:00) (66 - 82)  RR: 14 (28 Mar 2018 13:00) (10 - 17)  SpO2: 100% (28 Mar 2018 13:00) (94% - 100%)    Mode: AC/ CMV (Assist Control/ Continuous Mandatory Ventilation), RR (machine): 14, TV (machine): 500, FiO2: 40, PEEP: 5, ITime: 0.9, MAP: 7, PIP: 18    03-27 @ 07:01 - 03-28 @ 07:00  --------------------------------------------------------  IN: 2514.6 mL / OUT: 339 mL / NET: 2175.6 mL    03-28 @ 07:01 - 03-28 @ 13:53  --------------------------------------------------------  IN: 490 mL / OUT: 284 mL / NET: 206 mL      CAPILLARY BLOOD GLUCOSE      POCT Blood Glucose.: 173 mg/dL (28 Mar 2018 11:45)      PHYSICAL EXAM:    General: Intubated.  HEENT: NC/AT; right gaze, no tracking. PEERL  Neck: supple, R HD cath, L IJ  Respiratory: Bibasilar crackles   Cardiovascular: +S1/S2; RRR, ii/vi systolic murmur   Abdomen: soft, distended; Dull on percussion. +BS x4  : Decreased testicular edema   Extremities:  2+ LE edema. b/l venous stasis changes. cool to touch   Vascular: WWP, 2+ peripheral pulses b/l;  Skin: normal color and turgor; no rash  Neuro: Moves toes and nods head  on verbal command. Unable to move hands. No eye tracking.         MEDICATIONS:  MEDICATIONS  (STANDING):  aspirin  chewable 81 milliGRAM(s) Oral daily  atorvastatin 80 milliGRAM(s) Oral at bedtime  chlorhexidine 0.12% Liquid 15 milliLiter(s) Swish and Spit two times a day  chlorhexidine 2% Cloths 1 Application(s) Topical daily  dextrose 5%. 1000 milliLiter(s) (50 mL/Hr) IV Continuous <Continuous>  dextrose 50% Injectable 12.5 Gram(s) IV Push once  dextrose 50% Injectable 25 Gram(s) IV Push once  dextrose 50% Injectable 25 Gram(s) IV Push once  ergocalciferol Drops 6000 Unit(s) Oral daily  escitalopram 5 milliGRAM(s) Oral daily  folic acid 1 milliGRAM(s) Oral daily  hydrocortisone sodium succinate Injectable 50 milliGRAM(s) IV Push every 8 hours  insulin glargine Injectable (LANTUS) 24 Unit(s) SubCutaneous at bedtime  insulin lispro (HumaLOG) corrective regimen sliding scale   SubCutaneous every 6 hours  modafinil 100 milliGRAM(s) Oral <User Schedule>  multivitamin 1 Tablet(s) Oral daily  pantoprazole   Suspension 40 milliGRAM(s) Oral daily  PureFlow Dialysate RFP-400 (K 2 / Ca 3) 5000 milliLiter(s) (1500 mL/Hr) CRRT <Continuous>  sodium chloride 0.9% lock flush 20 milliLiter(s) IV Push once  thiamine 100 milliGRAM(s) Oral daily  warfarin 2 milliGRAM(s) Oral once    MEDICATIONS  (PRN):  acetaminophen    Suspension. 650 milliGRAM(s) Oral every 6 hours PRN Moderate Pain (4 - 6)  dextrose Gel 1 Dose(s) Oral once PRN Blood Glucose LESS THAN 70 milliGRAM(s)/deciliter  glucagon  Injectable 1 milliGRAM(s) IntraMuscular once PRN Glucose LESS THAN 70 milligrams/deciliter  sodium chloride 0.9% lock flush 10 milliLiter(s) IV Push every 1 hour PRN After each medication administration  sodium chloride 0.9% lock flush 10 milliLiter(s) IV Push every 12 hours PRN Lumen of catheter NOT used      ALLERGIES:  Allergies    No Known Allergies    Intolerances        LABS:                        8.3    10.4  )-----------( 61       ( 28 Mar 2018 06:21 )             26.5     03-28    135  |  99  |  33<H>  ----------------------------<  218<H>  3.8   |  23  |  1.82<H>    Ca    8.2<L>      28 Mar 2018 06:21  Phos  2.9     03-28  Mg     1.8     03-27    TPro  5.8<L>  /  Alb  3.2<L>  /  TBili  1.4<H>  /  DBili  x   /  AST  23  /  ALT  27  /  AlkPhos  225<H>  03-27    PT/INR - ( 28 Mar 2018 06:21 )   PT: 33.2 sec;   INR: 2.93          PTT - ( 28 Mar 2018 06:21 )  PTT:55.6 sec      RADIOLOGY & ADDITIONAL TESTS: Reviewed.

## 2018-03-28 NOTE — PROGRESS NOTE ADULT - ASSESSMENT
62 yo M history of HFrEF 10-15% 2/2 ischemic cardiomyopathy, MI , s/p AICD vs PPM, ?Afib, Hypertension, Diabetes Mellitus Type 2 on insulin, CKD?and gout, who presents with a chief complaint of generalized weakness. Now shakira - atn on CVVHD for managing the fluid status - we will continue with CVVHD with 75 ml/h, oliguric. Today to have a family meeting

## 2018-03-28 NOTE — PROGRESS NOTE ADULT - SUBJECTIVE AND OBJECTIVE BOX
HUNTER BRIZUELA   MRN-[id number]     (1955):     HPI:  64 yo M history of HFrEF 10-15% 2/2 ischemic cardiomyopathy, MI , s/p AICD vs PPM, ?Afib, Hypertension, Diabetes Mellitus Type 2 on insulin, CKD?and gout, who presents with a chief complaint of generalized weakness. Pt wad admitted to [facility] 2 months ago for gout and was sent to rehab. He was discharged home mid Feb with VNS. In the past 2 weeks patient has noted increased leg pain and weakness bilaterally. In the last few days, he has also experienced generalized weakness prompting him to come to ER.   In ER, noted to have t max of 102, SBP 70s, leukocytosis to 32.8, Lactate of 6.6, Acute renal failure with severe metabolic derangements. Given 3.5L fluids and Vanc/Zosyn. MICU consulted. (10 Mar 2018 18:51)    Pt has had a complicated course including pleural effusion with chest tube placement, renal failure with both CVVHD and then HD and significant hypotension requiring vasopressors.  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  Pt was converted back to CVVHD due to his HD related hypotension.   Last night, pt had an episode of unresponsiveness.  Pt was intubated for airway protection.  Imaging was negative for CVA.  VEEG in place.  Pt remains on CVVHD and is on vasopressin and levophed.   He is lightly sedated at this time and can respond weakly to intermittent simple commands.     Pt remains intubated  He is off sedation and has a poor mental status.  Pt continues on CVVHD but is currently off pressors. He is hypothermic and  that is thought to be correlated with new IV fluid bags with CVVHD.   CVP around 12    Pt received Provigil this am and is more engaging, al be it briefly. Pt will follow some simple commands.      ROS:   nonverbal as pt is intubated.   Unable to attain due to:  oral intubation                     Dyspnea (Heriberto 0-10):                       N/V (Y/N):                            Secretions (Y/N):              Agitation(Y/N):  Pain (Y/N):       -Provocation/Palliation:  -Quality/Quantity:  -Radiating:  -Severity:  -Timing/Frequency:  -Impact on ADLs:    Allergies:  No Known Allergies    Intolerances    Opiate Naive (Y/N):   yes  -iStop reviewed (Y/N):  yes ref # [id number]    MEDICATIONS  (STANDING):  aspirin  chewable 81 milliGRAM(s) Oral daily  atorvastatin 80 milliGRAM(s) Oral at bedtime  chlorhexidine 0.12% Liquid 15 milliLiter(s) Swish and Spit two times a day  chlorhexidine 2% Cloths 1 Application(s) Topical daily  dextrose 5%. 1000 milliLiter(s) (50 mL/Hr) IV Continuous <Continuous>  dextrose 50% Injectable 12.5 Gram(s) IV Push once  dextrose 50% Injectable 25 Gram(s) IV Push once  dextrose 50% Injectable 25 Gram(s) IV Push once  ergocalciferol Drops 6000 Unit(s) Oral daily  escitalopram 5 milliGRAM(s) Oral daily  folic acid 1 milliGRAM(s) Oral daily  hydrocortisone sodium succinate Injectable 50 milliGRAM(s) IV Push every 8 hours  insulin glargine Injectable (LANTUS) 24 Unit(s) SubCutaneous at bedtime  insulin lispro (HumaLOG) corrective regimen sliding scale   SubCutaneous every 6 hours  modafinil 100 milliGRAM(s) Oral <User Schedule>  multivitamin 1 Tablet(s) Oral daily  pantoprazole   Suspension 40 milliGRAM(s) Oral daily  PureFlow Dialysate RFP-400 (K 2 / Ca 3) 5000 milliLiter(s) (1500 mL/Hr) CRRT <Continuous>  sodium chloride 0.9% lock flush 20 milliLiter(s) IV Push once  thiamine 100 milliGRAM(s) Oral daily  warfarin 2 milliGRAM(s) Oral once    MEDICATIONS  (PRN):  acetaminophen    Suspension. 650 milliGRAM(s) Oral every 6 hours PRN Moderate Pain (4 - 6)  dextrose Gel 1 Dose(s) Oral once PRN Blood Glucose LESS THAN 70 milliGRAM(s)/deciliter  glucagon  Injectable 1 milliGRAM(s) IntraMuscular once PRN Glucose LESS THAN 70 milligrams/deciliter  sodium chloride 0.9% lock flush 10 milliLiter(s) IV Push every 1 hour PRN After each medication administration  sodium chloride 0.9% lock flush 10 milliLiter(s) IV Push every 12 hours PRN Lumen of catheter NOT use      Labs:                                          8.3    10.4  )-----------( 61       ( 28 Mar 2018 06:21 )             26.5         135  |  99  |  33<H>  ----------------------------<  218<H>  3.8   |  23  |  1.82<H>    Ca    8.2<L>      28 Mar 2018 06:21  Phos  2.9       Mg     1.8         TPro  5.8<L>  /  Alb  3.2<L>  /  TBili  1.4<H>  /  DBili  x   /  AST  23  /  ALT  27  /  AlkPhos  225<H>        Imaging:      Reviewed    < from: MR Head No Cont (18 @ 15:16) >  EXAM:  MR BRAIN                          PROCEDURE DATE:  2018           INTERPRETATION:  Clinical history: Aphasic and unresponsive.    Technique: MRI of the brain was performed utilizing sagittal and axial   T1, axial T2, axial gradient echo, axial and coronal FLAIR and diffusion   imaging.    There are no prior studies available for comparison.    Findings: There is a small 1.5 cm focus of T2 and FLAIR signal   hyperintensity in the left frontal cortex consistent with subacute to   chronic infarction. There are a few subcentimeter left parietal cortical   chronic infarctions. Small left temporal cortical chronic infarction   seen. There are small to moderate bilateral occipital chronic infarcts.   There is ex vacuo dilation of the atria and occipital horns.  There is no   evidence of acute infarction.      Mild patchy areas of low density within the periventricular white matter   and minimal patchy areas within the je consistent with mild   microvascular disease in a patient of this age. There is enlargement the   sulci, cisterns and ventricles consistent with mild cerebral and   cerebellar volume loss. There is superimposed prominence of the   ventricles.  There is no evidence of mass-effect or midline shift. There   is no evidence of intra or extra-axial fluid collection. The ventricles   are of normal size and caliber.the flow-voids to the left vertebral   artery is not well seen. Evaluation of the intracranial vascular   flow-voids appear within normal limits.    There are small bilateral polyps versus retention cyst within the   alveolar recesses of the maxillary sinuses. The remaining visualized   paranasal sinuses and bilateral mastoid air cells are clear.    Impression: Small left frontal cortical subacute to chronic infarction. A   few subcentimeter left parietal cortical chronic infarctions. Small left   temporal cortical chronic infarction Small to moderate bilateral   occipital chronic infarcts.  Mild microvascular disease.  No evidence of   acute infarction. Limited visualization of the left vertebral artery flow   void.    < end of copied text >    PEx:  ICU Vital Signs Last 24 Hrs  T(C): 35.7 (28 Mar 2018 11:00), Max: 36.2 (27 Mar 2018 19:00)  T(F): 96.3 (28 Mar 2018 11:00), Max: 97.2 (28 Mar 2018 06:17)  HR: 74 (28 Mar 2018 13:00) (74 - 90)  BP: 80/64 (28 Mar 2018 13:00) (77/67 - 83/74)  BP(mean): 71 (28 Mar 2018 13:00) (70 - 80)  ABP: 92/62 (28 Mar 2018 13:00) (86/54 - 112/68)  ABP(mean): 72 (28 Mar 2018 13:00) (66 - 82)  RR: 14 (28 Mar 2018 13:00) (10 - 17)  SpO2: 100% (28 Mar 2018 13:00) (94% - 100%)    General:  ill appearing, thin, not distressed   HEENT:  nc/at, thin,  temporal wasting, moist oral cavity, orally intubated, + dobhoff tube, eyes open briefly  Neck:   supple, neg lymphadenopathy,  L IJ TLC  CVS:  SR,  rate controlled, pacer noted to  L chest wall, + R HD catheter  Resp:  non-labored, vented  GI:   large, distended slightly, soft, + BS nontender, dependent edema  :   no barnes present, + scrotal edema  Musc:   weak, thin, no spontaneous movement  Ext: crusty, dressed lower L extremities, L arm PICC, R hand restraint  Neuro: off sedation, following some simple commands, + waking to voice  Psych: maritza  Skin:  thin, dry, cool, + generalized edema  Lymph:  neg  Preadmit Karnofsky:   50 %           Current Karnofsky:   30    %  Cachexia (Y/N):   yes  BMI:  22    Advanced Directives:     Full Code     HCP noted on chart     DPOA  (for finances)       Decision maker:   pt was deemed not to have decision making capacity on  by psychiatry  Legal surrogate:  pts dgt Cristal Castro [phone] is pts HCP    Social History:   single, lives by self, pt has a HHA 4hrs/day, 3 days per week.  Pt has 2 adult children (Cristal aged 37) and pts son, is 24 and lives in [facility]. 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    He is retired but reports he is not on disability.   Pt is "spiritual" and practices a "Religious" raquel background   Per notes, pt has not been compliant with his medication (not picked up prescription from his outpt pharmacy) since 2017.    GOALS OF CARE DISCUSSION:       Palliative care info/counseling provided-- following	           Family meeting- pts dgt//HCP Cristal will be present at 300pm to meet with team on        Advanced Directives addressed please see Advance Care Planning Note-- 	           Documentation of GOC:  trial of critical care          REFERRALS:	        Palliative Med        Unit SW/Case Mgmt       - referred       Massage Therapy-- referred       Music Therapy-- referred       Nutrition-- following

## 2018-03-28 NOTE — PROGRESS NOTE ADULT - SUBJECTIVE AND OBJECTIVE BOX
This note is in progress.     OVERNIGHT EVENTS: No overnight events.     SUBJECTIVE / INTERVAL HPI: Patient seen and examined at bedside. Patient remains intubated     VITAL SIGNS:  Vital Signs Last 24 Hrs  T(C): 36.2 (28 Mar 2018 06:17), Max: 36.6 (27 Mar 2018 10:00)  T(F): 97.2 (28 Mar 2018 06:17), Max: 97.8 (27 Mar 2018 10:00)  HR: 76 (28 Mar 2018 08:00) (72 - 90)  RR: 14 (28 Mar 2018 08:00) (10 - 18)  SpO2: 98% (28 Mar 2018 08:45) (94% - 100%)    PHYSICAL EXAM:  General: WDWN  HEENT: NC/AT; PERRL, anicteric sclera; MMM  Neck: supple  Cardiovascular: +S1/S2; RRR  Respiratory: CTA B/L; no W/R/R  Gastrointestinal: soft, NT/ND; +BSx4  Extremities: WWP; no edema, clubbing or cyanosis  Vascular: 2+ radial, DP/PT pulses B/L  Neurological: AAOx3; no focal deficits    MEDICATIONS:  MEDICATIONS  (STANDING):  aspirin  chewable 81 milliGRAM(s) Oral daily  atorvastatin 80 milliGRAM(s) Oral at bedtime  chlorhexidine 0.12% Liquid 15 milliLiter(s) Swish and Spit two times a day  chlorhexidine 2% Cloths 1 Application(s) Topical daily  dextrose 5%. 1000 milliLiter(s) (50 mL/Hr) IV Continuous <Continuous>  dextrose 50% Injectable 12.5 Gram(s) IV Push once  dextrose 50% Injectable 25 Gram(s) IV Push once  dextrose 50% Injectable 25 Gram(s) IV Push once  ergocalciferol Drops 6000 Unit(s) Oral daily  escitalopram 5 milliGRAM(s) Oral daily  folic acid 1 milliGRAM(s) Oral daily  hydrocortisone sodium succinate Injectable 50 milliGRAM(s) IV Push every 8 hours  insulin glargine Injectable (LANTUS) 24 Unit(s) SubCutaneous at bedtime  insulin lispro (HumaLOG) corrective regimen sliding scale   SubCutaneous every 6 hours  modafinil 100 milliGRAM(s) Oral <User Schedule>  multivitamin 1 Tablet(s) Oral daily  norepinephrine Infusion 0.01 MICROgram(s)/kG/Min (0.75 mL/Hr) IV Continuous <Continuous>  pantoprazole   Suspension 40 milliGRAM(s) Oral daily  PureFlow Dialysate RFP-400 (K 2 / Ca 3) 5000 milliLiter(s) (1500 mL/Hr) CRRT <Continuous>  sodium chloride 0.9% lock flush 20 milliLiter(s) IV Push once  thiamine 100 milliGRAM(s) Oral daily    MEDICATIONS  (PRN):  acetaminophen    Suspension. 650 milliGRAM(s) Oral every 6 hours PRN Moderate Pain (4 - 6)  dextrose Gel 1 Dose(s) Oral once PRN Blood Glucose LESS THAN 70 milliGRAM(s)/deciliter  glucagon  Injectable 1 milliGRAM(s) IntraMuscular once PRN Glucose LESS THAN 70 milligrams/deciliter  sodium chloride 0.9% lock flush 10 milliLiter(s) IV Push every 1 hour PRN After each medication administration  sodium chloride 0.9% lock flush 10 milliLiter(s) IV Push every 12 hours PRN Lumen of catheter NOT used      ALLERGIES:  Allergies  No Known Allergies  Intolerances      LABS:                        8.3    10.4  )-----------( 61       ( 28 Mar 2018 06:21 )             26.5     03-28    135  |  99  |  33<H>  ----------------------------<  218<H>  3.8   |  23  |  1.82<H>    Ca    8.2<L>      28 Mar 2018 06:21  Phos  2.9     03-28  Mg     1.8     03-27    TPro  5.8<L>  /  Alb  3.2<L>  /  TBili  1.4<H>  /  DBili  x   /  AST  23  /  ALT  27  /  AlkPhos  225<H>  03-27    PT/INR - ( 28 Mar 2018 06:21 )   PT: 33.2 sec;   INR: 2.93     PTT - ( 28 Mar 2018 06:21 )  PTT:55.6 sec  CAPILLARY BLOOD GLUCOSE  POCT Blood Glucose.: 171 mg/dL (28 Mar 2018 06:24)      RADIOLOGY & ADDITIONAL TESTS: Reviewed.    CXR 3/28: unchanged from prior. OVERNIGHT EVENTS: No overnight events.     SUBJECTIVE / INTERVAL HPI: Patient seen and examined at bedside. Patient remains intubated, is no longer sedated, and is able to follow some basic commands like opening his eyes and trying to move his hand.     VITAL SIGNS:  Vital Signs Last 24 Hrs  T(C): 36.2 (28 Mar 2018 06:17), Max: 36.6 (27 Mar 2018 10:00)  T(F): 97.2 (28 Mar 2018 06:17), Max: 97.8 (27 Mar 2018 10:00)  HR: 76 (28 Mar 2018 08:00) (72 - 90)  RR: 14 (28 Mar 2018 08:00) (10 - 18)  SpO2: 98% (28 Mar 2018 08:45) (94% - 100%)    PHYSICAL EXAM:  General: intubated, laying in bed with eyes closed, no apparent distress  HEENT: NC/AT; anicteric sclera; MMM  Neck: supple, jvp present   Cardiovascular: +S1/S2; RRR  Respiratory: bilateral basilar crackles  Gastrointestinal: soft, NT/ND; +BSx4  : no barnes  Extremities: WWP; no clubbing or cyanosis. Edema in lower extremities bilaterally, mild improvement from yesterday. Hands edematous to wrists: in restraints.   Vascular: 1+ radial, DP/PT pulses B/L  Neurological: minimally responsive to commands, stirs with calling his name.    MEDICATIONS:  MEDICATIONS  (STANDING):  aspirin  chewable 81 milliGRAM(s) Oral daily  atorvastatin 80 milliGRAM(s) Oral at bedtime  chlorhexidine 0.12% Liquid 15 milliLiter(s) Swish and Spit two times a day  chlorhexidine 2% Cloths 1 Application(s) Topical daily  dextrose 5%. 1000 milliLiter(s) (50 mL/Hr) IV Continuous <Continuous>  dextrose 50% Injectable 12.5 Gram(s) IV Push once  dextrose 50% Injectable 25 Gram(s) IV Push once  dextrose 50% Injectable 25 Gram(s) IV Push once  ergocalciferol Drops 6000 Unit(s) Oral daily  escitalopram 5 milliGRAM(s) Oral daily  folic acid 1 milliGRAM(s) Oral daily  hydrocortisone sodium succinate Injectable 50 milliGRAM(s) IV Push every 8 hours  insulin glargine Injectable (LANTUS) 24 Unit(s) SubCutaneous at bedtime  insulin lispro (HumaLOG) corrective regimen sliding scale   SubCutaneous every 6 hours  modafinil 100 milliGRAM(s) Oral <User Schedule>  multivitamin 1 Tablet(s) Oral daily  norepinephrine Infusion 0.01 MICROgram(s)/kG/Min (0.75 mL/Hr) IV Continuous <Continuous>  pantoprazole   Suspension 40 milliGRAM(s) Oral daily  PureFlow Dialysate RFP-400 (K 2 / Ca 3) 5000 milliLiter(s) (1500 mL/Hr) CRRT <Continuous>  sodium chloride 0.9% lock flush 20 milliLiter(s) IV Push once  thiamine 100 milliGRAM(s) Oral daily    MEDICATIONS  (PRN):  acetaminophen    Suspension. 650 milliGRAM(s) Oral every 6 hours PRN Moderate Pain (4 - 6)  dextrose Gel 1 Dose(s) Oral once PRN Blood Glucose LESS THAN 70 milliGRAM(s)/deciliter  glucagon  Injectable 1 milliGRAM(s) IntraMuscular once PRN Glucose LESS THAN 70 milligrams/deciliter  sodium chloride 0.9% lock flush 10 milliLiter(s) IV Push every 1 hour PRN After each medication administration  sodium chloride 0.9% lock flush 10 milliLiter(s) IV Push every 12 hours PRN Lumen of catheter NOT used      ALLERGIES:  Allergies  No Known Allergies  Intolerances      LABS:                        8.3    10.4  )-----------( 61       ( 28 Mar 2018 06:21 )             26.5     03-28    135  |  99  |  33<H>  ----------------------------<  218<H>  3.8   |  23  |  1.82<H>    Ca    8.2<L>      28 Mar 2018 06:21  Phos  2.9     03-28  Mg     1.8     03-27    TPro  5.8<L>  /  Alb  3.2<L>  /  TBili  1.4<H>  /  DBili  x   /  AST  23  /  ALT  27  /  AlkPhos  225<H>  03-27    PT/INR - ( 28 Mar 2018 06:21 )   PT: 33.2 sec;   INR: 2.93     PTT - ( 28 Mar 2018 06:21 )  PTT:55.6 sec  CAPILLARY BLOOD GLUCOSE  POCT Blood Glucose.: 171 mg/dL (28 Mar 2018 06:24)      RADIOLOGY & ADDITIONAL TESTS: Reviewed.    CXR 3/28: unchanged from prior.

## 2018-03-29 LAB
ANION GAP SERPL CALC-SCNC: 11 MMOL/L — SIGNIFICANT CHANGE UP (ref 5–17)
ANION GAP SERPL CALC-SCNC: 12 MMOL/L — SIGNIFICANT CHANGE UP (ref 5–17)
APTT BLD: 44.9 SEC — HIGH (ref 27.5–37.4)
BUN SERPL-MCNC: 29 MG/DL — HIGH (ref 7–23)
BUN SERPL-MCNC: 32 MG/DL — HIGH (ref 7–23)
CALCIUM SERPL-MCNC: 8.2 MG/DL — LOW (ref 8.4–10.5)
CALCIUM SERPL-MCNC: 8.3 MG/DL — LOW (ref 8.4–10.5)
CHLORIDE SERPL-SCNC: 100 MMOL/L — SIGNIFICANT CHANGE UP (ref 96–108)
CHLORIDE SERPL-SCNC: 99 MMOL/L — SIGNIFICANT CHANGE UP (ref 96–108)
CO2 SERPL-SCNC: 23 MMOL/L — SIGNIFICANT CHANGE UP (ref 22–31)
CO2 SERPL-SCNC: 25 MMOL/L — SIGNIFICANT CHANGE UP (ref 22–31)
CREAT SERPL-MCNC: 1.67 MG/DL — HIGH (ref 0.5–1.3)
CREAT SERPL-MCNC: 1.72 MG/DL — HIGH (ref 0.5–1.3)
CULTURE RESULTS: SIGNIFICANT CHANGE UP
GLUCOSE BLDC GLUCOMTR-MCNC: 129 MG/DL — HIGH (ref 70–99)
GLUCOSE BLDC GLUCOMTR-MCNC: 138 MG/DL — HIGH (ref 70–99)
GLUCOSE BLDC GLUCOMTR-MCNC: 152 MG/DL — HIGH (ref 70–99)
GLUCOSE BLDC GLUCOMTR-MCNC: 168 MG/DL — HIGH (ref 70–99)
GLUCOSE SERPL-MCNC: 140 MG/DL — HIGH (ref 70–99)
GLUCOSE SERPL-MCNC: 204 MG/DL — HIGH (ref 70–99)
HCT VFR BLD CALC: 26.8 % — LOW (ref 39–50)
HCT VFR BLD CALC: 26.8 % — LOW (ref 39–50)
HGB BLD-MCNC: 8.1 G/DL — LOW (ref 13–17)
HGB BLD-MCNC: 8.4 G/DL — LOW (ref 13–17)
INR BLD: 3.31 — HIGH (ref 0.88–1.16)
MAGNESIUM SERPL-MCNC: 1.8 MG/DL — SIGNIFICANT CHANGE UP (ref 1.6–2.6)
MAGNESIUM SERPL-MCNC: 1.9 MG/DL — SIGNIFICANT CHANGE UP (ref 1.6–2.6)
MCHC RBC-ENTMCNC: 30.2 G/DL — LOW (ref 32–36)
MCHC RBC-ENTMCNC: 30.3 PG — SIGNIFICANT CHANGE UP (ref 27–34)
MCHC RBC-ENTMCNC: 31.3 G/DL — LOW (ref 32–36)
MCHC RBC-ENTMCNC: 31.3 PG — SIGNIFICANT CHANGE UP (ref 27–34)
MCV RBC AUTO: 100 FL — SIGNIFICANT CHANGE UP (ref 80–100)
MCV RBC AUTO: 100.4 FL — HIGH (ref 80–100)
PHOSPHATE SERPL-MCNC: 2.1 MG/DL — LOW (ref 2.5–4.5)
PHOSPHATE SERPL-MCNC: 2.2 MG/DL — LOW (ref 2.5–4.5)
PLATELET # BLD AUTO: 52 K/UL — LOW (ref 150–400)
PLATELET # BLD AUTO: 55 K/UL — LOW (ref 150–400)
POTASSIUM SERPL-MCNC: 3.8 MMOL/L — SIGNIFICANT CHANGE UP (ref 3.5–5.3)
POTASSIUM SERPL-MCNC: 3.9 MMOL/L — SIGNIFICANT CHANGE UP (ref 3.5–5.3)
POTASSIUM SERPL-SCNC: 3.8 MMOL/L — SIGNIFICANT CHANGE UP (ref 3.5–5.3)
POTASSIUM SERPL-SCNC: 3.9 MMOL/L — SIGNIFICANT CHANGE UP (ref 3.5–5.3)
PROTHROM AB SERPL-ACNC: 37.6 SEC — HIGH (ref 9.8–12.7)
RBC # BLD: 2.67 M/UL — LOW (ref 4.2–5.8)
RBC # BLD: 2.68 M/UL — LOW (ref 4.2–5.8)
RBC # FLD: 20.1 % — HIGH (ref 10.3–16.9)
RBC # FLD: 20.8 % — HIGH (ref 10.3–16.9)
SODIUM SERPL-SCNC: 135 MMOL/L — SIGNIFICANT CHANGE UP (ref 135–145)
SODIUM SERPL-SCNC: 135 MMOL/L — SIGNIFICANT CHANGE UP (ref 135–145)
SPECIMEN SOURCE: SIGNIFICANT CHANGE UP
WBC # BLD: 9 K/UL — SIGNIFICANT CHANGE UP (ref 3.8–10.5)
WBC # BLD: 9.8 K/UL — SIGNIFICANT CHANGE UP (ref 3.8–10.5)
WBC # FLD AUTO: 9 K/UL — SIGNIFICANT CHANGE UP (ref 3.8–10.5)
WBC # FLD AUTO: 9.8 K/UL — SIGNIFICANT CHANGE UP (ref 3.8–10.5)

## 2018-03-29 PROCEDURE — 90945 DIALYSIS ONE EVALUATION: CPT

## 2018-03-29 PROCEDURE — 99291 CRITICAL CARE FIRST HOUR: CPT

## 2018-03-29 PROCEDURE — 71045 X-RAY EXAM CHEST 1 VIEW: CPT | Mod: 26

## 2018-03-29 PROCEDURE — 99233 SBSQ HOSP IP/OBS HIGH 50: CPT

## 2018-03-29 RX ORDER — METOPROLOL TARTRATE 50 MG
12.5 TABLET ORAL EVERY 12 HOURS
Qty: 0 | Refills: 0 | Status: DISCONTINUED | OUTPATIENT
Start: 2018-03-29 | End: 2018-03-30

## 2018-03-29 RX ORDER — SODIUM,POTASSIUM PHOSPHATES 278-250MG
1 POWDER IN PACKET (EA) ORAL ONCE
Qty: 0 | Refills: 0 | Status: COMPLETED | OUTPATIENT
Start: 2018-03-29 | End: 2018-03-29

## 2018-03-29 RX ORDER — HYDROCORTISONE 20 MG
25 TABLET ORAL EVERY 8 HOURS
Qty: 0 | Refills: 0 | Status: DISCONTINUED | OUTPATIENT
Start: 2018-03-29 | End: 2018-03-30

## 2018-03-29 RX ORDER — MAGNESIUM OXIDE 400 MG ORAL TABLET 241.3 MG
800 TABLET ORAL ONCE
Qty: 0 | Refills: 0 | Status: COMPLETED | OUTPATIENT
Start: 2018-03-29 | End: 2018-03-29

## 2018-03-29 RX ADMIN — Medication 2: at 18:27

## 2018-03-29 RX ADMIN — INSULIN GLARGINE 26 UNIT(S): 100 INJECTION, SOLUTION SUBCUTANEOUS at 21:53

## 2018-03-29 RX ADMIN — Medication 100 MILLIGRAM(S): at 11:04

## 2018-03-29 RX ADMIN — Medication 1 TABLET(S): at 11:04

## 2018-03-29 RX ADMIN — Medication 1 PACKET(S): at 07:45

## 2018-03-29 RX ADMIN — MODAFINIL 100 MILLIGRAM(S): 200 TABLET ORAL at 06:22

## 2018-03-29 RX ADMIN — MAGNESIUM OXIDE 400 MG ORAL TABLET 800 MILLIGRAM(S): 241.3 TABLET ORAL at 02:34

## 2018-03-29 RX ADMIN — ATORVASTATIN CALCIUM 80 MILLIGRAM(S): 80 TABLET, FILM COATED ORAL at 21:55

## 2018-03-29 RX ADMIN — Medication 25 MILLIGRAM(S): at 18:22

## 2018-03-29 RX ADMIN — ERGOCALCIFEROL 6000 UNIT(S): 1.25 CAPSULE ORAL at 11:03

## 2018-03-29 RX ADMIN — MODAFINIL 100 MILLIGRAM(S): 200 TABLET ORAL at 11:04

## 2018-03-29 RX ADMIN — ESCITALOPRAM OXALATE 5 MILLIGRAM(S): 10 TABLET, FILM COATED ORAL at 11:04

## 2018-03-29 RX ADMIN — Medication 12.5 MILLIGRAM(S): at 16:01

## 2018-03-29 RX ADMIN — Medication 2: at 21:55

## 2018-03-29 RX ADMIN — Medication 1 MILLIGRAM(S): at 11:04

## 2018-03-29 RX ADMIN — PANTOPRAZOLE SODIUM 40 MILLIGRAM(S): 20 TABLET, DELAYED RELEASE ORAL at 11:04

## 2018-03-29 RX ADMIN — Medication 81 MILLIGRAM(S): at 11:08

## 2018-03-29 RX ADMIN — CHLORHEXIDINE GLUCONATE 15 MILLILITER(S): 213 SOLUTION TOPICAL at 21:59

## 2018-03-29 RX ADMIN — Medication 50 MILLIGRAM(S): at 03:01

## 2018-03-29 RX ADMIN — Medication 25 MILLIGRAM(S): at 11:03

## 2018-03-29 RX ADMIN — CHLORHEXIDINE GLUCONATE 15 MILLILITER(S): 213 SOLUTION TOPICAL at 11:03

## 2018-03-29 RX ADMIN — CHLORHEXIDINE GLUCONATE 1 APPLICATION(S): 213 SOLUTION TOPICAL at 11:02

## 2018-03-29 NOTE — PROGRESS NOTE ADULT - ASSESSMENT
63M PMH HFrEF 10-15% (ischemic), MI, s/p AICD vs PPM, possible Afib, HTN, DM2 on insulin, possible CKD, and gout, who presents with a chief complaint of generalized weakness s/p septic shock likely 2/2 LE cellulitis. Now with AMS likely from brainstem infarct.     NEURO  #AMS  - Pt non responsive since last week. VItal signs normal during the event. Pt intubated for airway protection. Stroke code called. CT, CTA negative. VEEG in place- moderate slowing but no seizure activity.   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 Per neuro, even could have resulted from hypoperfusion.   - Started on Modafinil.    - no seizure activity on VEEG  -  TTE with Affinity shows no clot.   - neuro team consulted. f/u recs     #   ID- septic shock 2/2 Cellulitis- resolved   -Resolved, now off pressors   - steroids tapered to  25 q8  -.Discontinued zosyn for suspected UTI,(3/25-3/26)  s/p Zosyn (3/11-2/21). Was previously also on Vanco (3/11-3/17).   - RUQ dopper shows no portal vein thrombosis   - TTE with no vegetations. Pt not stable for RUKHSANA.   - Gallium scan read shows LLE cellulitis. ,    - repeat LE CT does not show abscess or gas.   - R lung Effusion likely from overload. s/p thoracentesis with improvement in resp status   -  HIV negative  - Vascular surgery on board. Recommending Amputation as an option if pt continues to be unstable.     #UTI- Urine cloudy appearing with increase WBC.  - f/u UA/UC  - discontinued zosyn 3/25-3/26    CARDIOVASCULAR   # HYPOTENSION-   - Off pressors. Will maintain SBP>65.   -  Will continue CVVHD to help reduce CVP   - pending transfer to CCU  - Gallium scan with only LLE cellulitis   - Lactate downtrended to WNL   - CHF with severely reduced EF 15%, caution with fluids. Per renal recs, can give IV albumin for fluids.    # CHF exacerbation  - CHF with EF 10-15% per pt 2/2 ischemic cardiomyopathy s/p AICD as indicated by CXR   - Elevated BNP on admission with R sided effusion and edema  - Cardiac shock causing hypotension- now improved will monitor as we increase UF rate on CVVHD  - Persistent pulm congestion noted on chest Xray. c/w CVVHD  - Give fluids judiciously given low EF in setting of likely sepsis  - Unclear medical regimen opt. Per [facility] ( and [place]) pt has not picked up Torsemide 20 or Metoprolol 100 since November.   - Hold ACE/Metoprolol in setting of sepsis  - CVO saturation improved after transfusion    #AFIB  - PT with hx of Afib and LV thrombus on coumadin.    -d/c  Hep drip given concern of HIT.d/c Argatroban.  - Dose coumadin daily. OG tube placed.   - Pt s/p FFP (3/13,3/14) and Vit K (3/13) for thoracocentesis and HD catheter placement.   -TTE with affinity shows no  LV thrombus        #CAD- Hx of MI s/p AICD  - Pt with pLAD in 7/2017, was started on Aspirin and Brelinta per records.  - Will start on asp 81, Atorvastatin 80   - c/w Coumadin.     # LE Edema   - LE edema with chronic venous stasis.   - Duplex does not show DVT    PULMONARY   Intubated  - Intubated on 3/22 for airway protection   - Will attempt CPAP trial and extubation once patient more responsive     #Acute resp failure- Resolved   - Intubated for airway protection   - pt with R loculated plural effusion noted to have increased work of breathing. Likely 2/2 fluid overload, Fluid studies consistent with exudative effusion.   - s/p thoracentesis on 3/13 with R chest tube placement. Chest tube removed 3/15.   - s/p  pRBC transfusion on 3/12. 3/16  - Monitor resp status  - Appreciate CHF team recs regarding fluid status. Continue dialysis for fluid removal      #RENAL   #ARF- 2/2 ischemic ATN  -Unknown baseline Cr presenting with Cr 3.93 with metabolic derangements.   - Likely 2/2 Sepsis, low intravascular volume and CHF  - Trend BUN and Cr.  - Renal team on board, pt with R HD catheter placed by IR on 3/21. c/w CVVHD currently removing 25cc/hour, will increase rate   - CT abdomen and pelvis without acute obstruction.   - retroperitoneal ultrasound showing medical renal disease.     #Hyperkalemia   -Resolved   - PT with elevated K to 5.7 on admission,  no EKG changes   - Continue telemetry monitoring  - Trend K   - Can given Kayexalate if persists   - c/w dialysis     #Metabolic acidosis   - 2/2 Lactate, starvation ketoacidosis and uremia   - resolved       #GI   - pt wit Elevated Bilirubin and Alk phos. Abd exam benign  - CT abdomen/pelvis consistent with cholelithiasis and ascites.     #HEME    #Thrombocytopenia  - ELVIRA negative but PF4 was positive. Unlikely HIT.    - Could have be related to Vanc/zosyn.   - Decreasing since Zosyn was restarted. Will monitor,     # elevated INR. Unclear etiology. Pt on coumadin?   - Continue with daily coumadin dosing   - Given Vit K and FFP3/12. FFP 3/13   - Monitor coags    #Anemia  - Stable at 8-9. Anemia of chronic disease.  -s/p 1pRBC on 3/12, 3/16   - WIll keep Hb around 9-10     #ENDOCRINE   - S/p insulin drip  - increased Lantus to 24U since pt now hyperglycemic with stress dose steroids.   - Will adjust as we taper down steroids.    Monitor blood glucose and ISS coverage. pt with episodes of hypoglycemia.   - Monitor blood glucose   - A1C 8.6%      F: No IVF   E: Replete K<4 Mg<2, caution in setting of acute renal failure  N: OG tube placed, feeds at goal     Lines:  ETT (3/22), OG tube (3/21)R HD catheter (3/21). L IJ 3/21, PICC line 3/20    Full code   Dispo: MICU  Ppx: Coumadin, PPI 63M PMH HFrEF 10-15% (ischemic), MI, s/p AICD vs PPM, possible Afib, HTN, DM2 on insulin, possible CKD, and gout, who presents with a chief complaint of generalized weakness s/p septic shock likely 2/2 LE cellulitis. Now with AMS likely from brainstem infarct.     NEURO  #AMS  - Pt non responsive since last week. VItal signs normal during the event. Pt intubated for airway protection. Stroke code called. CT, CTA negative. VEEG in place- moderate slowing but no seizure activity.   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 Per neuro, even could have resulted from hypoperfusion.   - Started on Modafinil.    - no seizure activity on VEEG  -  TTE with Affinity shows no clot.   - neuro team consulted. f/u recs     #   ID- septic shock 2/2 Cellulitis- resolved   -Resolved, now off pressors   - steroids tapered to  25 q8  -.Discontinued zosyn for suspected UTI,(3/25-3/26)  s/p Zosyn (3/11-2/21). Was previously also on Vanco (3/11-3/17).   - RUQ dopper shows no portal vein thrombosis   - TTE with no vegetations. Pt not stable for RUKHSANA.   - Gallium scan read shows LLE cellulitis. ,    - repeat LE CT does not show abscess or gas.   - R lung Effusion likely from overload. s/p thoracentesis with improvement in resp status   -  HIV negative  - Vascular surgery on board. Recommending Amputation as an option if pt continues to be unstable.     #UTI- Urine cloudy appearing with increase WBC.  - discontinued zosyn 3/25-3/26    CARDIOVASCULAR   # HYPOTENSION-   - Off pressors. Will maintain SBP>65.   -  Switching to HD starting tomorrow. OFF CVVHD  - pending transfer to CCU  - Gallium scan with only LLE cellulitis   - Lactate downtrended to WNL   - CHF with severely reduced EF 15%, caution with fluids. Per renal recs, can give IV albumin for fluids.    # CHF exacerbation  - CHF with EF 10-15% per pt 2/2 ischemic cardiomyopathy s/p AICD as indicated by CXR   - Elevated BNP on admission with R sided effusion and edema  - Cardiac shock causing hypotension- now improved will monitor as we increase UF rate on CVVHD  - Persistent pulm congestion noted on chest Xray. c/w CVVHD  - Give fluids judiciously given low EF in setting of likely sepsis  - Unclear medical regimen opt. Per [facility] pharmacy ( and [place]) pt has not picked up Torsemide 20 or Metoprolol 100 since November.   - Hold ACE/Metoprolol in setting of sepsis  - CVO saturation improved after transfusion    #AFIB  - PT with hx of Afib and LV thrombus on coumadin.    -d/c  Hep drip given concern of HIT.d/c Argatroban.  - Dose coumadin daily. OG tube placed.   - Pt s/p FFP (3/13,3/14) and Vit K (3/13) for thoracocentesis and HD catheter placement.   -TTE with affinity shows no  LV thrombus        #CAD- Hx of MI s/p AICD  - Pt with pLAD in 7/2017, was started on Aspirin and Brelinta per records.  - Will start on asp 81, Atorvastatin 80   - c/w Coumadin.     # LE Edema   - LE edema with chronic venous stasis.   - Duplex does not show DVT    PULMONARY   Intubated  - Intubated on 3/22 for airway protection   - Will attempt CPAP trial and extubation once patient more responsive     #Acute resp failure- Resolved   - Intubated for airway protection   - pt with R loculated plural effusion noted to have increased work of breathing. Likely 2/2 fluid overload, Fluid studies consistent with exudative effusion.   - s/p thoracentesis on 3/13 with R chest tube placement. Chest tube removed 3/15.   - s/p  pRBC transfusion on 3/12. 3/16  - Monitor resp status  - Appreciate CHF team recs regarding fluid status. Continue dialysis for fluid removal      #RENAL   #ARF- 2/2 ischemic ATN  -Unknown baseline Cr presenting with Cr 3.93 with metabolic derangements.   - Likely 2/2 Sepsis, low intravascular volume and CHF  - Trend BUN and Cr.  - Renal team on board, pt with R HD catheter placed by IR on 3/21. Switching to HD starting tomorrow.   - CT abdomen and pelvis without acute obstruction.   - retroperitoneal ultrasound showing medical renal disease.     #Hyperkalemia   -Resolved   - PT with elevated K to 5.7 on admission,  no EKG changes   - Continue telemetry monitoring  - Trend K   - Can given Kayexalate if persists   - c/w dialysis     #Metabolic acidosis   - 2/2 Lactate, starvation ketoacidosis and uremia   - resolved       #GI   - pt wit Elevated Bilirubin and Alk phos. Abd exam benign  - CT abdomen/pelvis consistent with cholelithiasis and ascites.     #HEME    #Thrombocytopenia  - ELVIRA negative but PF4 was positive. Unlikely HIT.    - Could have be related to zosyn or sepsis   - Decreasing since Zosyn was restarted. Will monitor,     # elevated INR. Unclear etiology. Pt on coumadin?   - Continue with daily coumadin dosing   - Given Vit K and FFP3/12. FFP 3/13   - Monitor coags    #Anemia  - Stable at 8-9. Anemia of chronic disease.  -s/p 1pRBC on 3/12, 3/16   - WIll keep Hb around 9-10     #ENDOCRINE   - S/p insulin drip  - increased Lantus to 26 U since pt now hyperglycemic with stress dose steroids.   - Will adjust as we taper down steroids.    Monitor blood glucose and ISS coverage. pt with episodes of hypoglycemia.   - Monitor blood glucose   - A1C 8.6%      F: No IVF   E: Replete K<4 Mg<2, caution in setting of acute renal failure  N: OG tube placed, feeds at goal     Lines:  ETT (3/22), OG tube (3/21)R HD catheter (3/21). L IJ 3/21, PICC line 3/20    Full code   Dispo: MICU  Ppx: Coumadin, PPI

## 2018-03-29 NOTE — PROGRESS NOTE ADULT - PROBLEM SELECTOR PLAN 1
- oliguric GILMER from ATN from septic shock   - On CVVHD - stopped on 3/3o   - anuric   - we will try HD on 3/30  - please follow up with the CHF/cardiology team - noted   - volume status acceptable - on the wet side   - in and outs   - daily weight   - electrolytes noted

## 2018-03-29 NOTE — PROGRESS NOTE ADULT - PROBLEM SELECTOR PLAN 2
cvvhd stopped.  Renal to determine if HD is necessary on 3/30  Renal recovery is difficulty to predict as pts baseline values are not available

## 2018-03-29 NOTE — PROGRESS NOTE ADULT - ATTENDING COMMENTS
Pt remains critically ill, requiring mechanical ventilation and CVVH. Vasopressors titrated off. No urine production. Neuro status mildly improved, some following to verbal command, lifts head off bed.  Continue mechanical vent support  Continue CVVH, will d/w renal trial of intermittent HD  Continue CV support for chronic CHF with 10%EF  Standard vent bundle  GI/DVT prophy  Enteral feeds at goal

## 2018-03-29 NOTE — EEG REPORT - NS EEG TEXT BOX
[facility] Department of Neurology  Inpatient Continuous Video Electroencephalography Report    Patient Name:	HUNTER BRIZUELA    :	1955  MRN:	[id number]    Study Start Date/Time:  3/22/2018, 2046  Study End Date/Time: 3/27/2018, 1023	(interruptions as described below)    Referred by: Jace Rose MD    Brief Clinical History:  64 y/o man, CHF, hx of MI, s/p AICD, possible Afib, HTN, insulin-dependent DM2, possible CKD, and gout, presented with generalized weakness, septic shock (likely 2/2 LE cellulitis), became unresponsive after arterial line placement.    Diagnosis Code:   R56.9 convulsions/seizure  CPT: 71911-76 vEEG 6-12 hours    Acquisition Details:  Electroencephalography was acquired using a minimum of 21 channels on an [employer] system v 8.1.1 with electrode placement according to the standard International 10-20 system following ACNS (American Clinical Neurophysiology Society) guidelines for Long-Term Video EEG monitoring.  Anterior temporal T1 and T2 electrodes were utilized whenever possible. The [employer] automated spike & seizure detections were all reviewed in detail, in addition to extensive portions of raw EEG.    Day 1: 3/22/2018,  -> 3/23 07  Pertinent medications: Propofol  Background   Symmetry: Symmetric  Frequencies: Predominantly mixed frequencies (mostly theta/beta) at times sharply contoured  Anterior-posterior (AP) gradient: Absent  Posterior Dominant Rhythm: No clear PDR   Voltage:  Normal (most activity >20 uV)  Continuity: nearly continuous (<10% periods of attenuation),   Variability: Yes. 						  Reactivity: Yes.  Breach: No  N2 sleep: Absent..  Epileptiform discharges:  No epileptiform discharges   Abnormal periodic/rhythmic activity: No   Events:  No electrographic seizures or paroxysmal clinical events.  Provocations: Hyperventilation and photic were not performed  Other findings: None.  ECG: ECG uninterpretable  Daily summary and impression:    The EEG was abnormal during this day of monitoring due to:    Moderate generalized background slowing suggestive of a similar degree of diffuse or multifocal  cerebral dysfunction. There were no electrographic seizures, epileptiform discharges, or paroxysmal clinical events.    Read by:  Melissa Russell MD    Daily Updates (from 07:00 am until 07:00 am unless otherwise indicated):      Day 2:  3/23 -> 3/24  Pertinent medications: No changes  The generalized slowing improved compared to the prior day. There were no electrographic seizures, epileptiform discharges, or paroxysmal clinical events.     Daily summary and impression:    Mild generalized background slowing suggestive of a similar degree of diffuse or multifocal   cerebral dysfunction. There were no electrographic seizures, epileptiform discharges, or paroxysmal clinical events.     Read by:  Felipe Cerda MD    Day 3:  3/24 -> 3/25  Pertinent medications: No changes  The EEG was unchanged since the prior day. There were no paroxysmal clinical events.     Daily summary and impression:    Mild generalized background slowing suggestive of a similar degree of diffuse or multifocal   cerebral dysfunction. There were no electrographic seizures, epileptiform discharges, or paroxysmal clinical events.     Read by:  Felipe Cerda MD    Day 4:  3/25 -> 3/26  Pertinent medications: Propofol gtt weaned off by 0601 on 3/26  Background: The awake background consisted of predominantly alpha/theta frequencies with an occasional normal AP gradient (otherwise absent). There was an occasional well-organized and well-modulated PDR of 9.5 Hz.    Focal abnormalities: none.   Voltage: 15-30 uV  Continuity: nearly continuous.  Variability: yes. 						  Reactivity: yes.							  Breach: no.  State changes: yes.					  Stage II transients: symmetric spindles and K complexes.   Epileptiform discharges: none.  Abnormal periodic/rhythmic activity: none.  Seizures or paroxysmal clinical events: none.  HV, photic, or other provocations: not performed.  Other EEG findings: none.     Daily summary and impression:    The background was occasionally normal, though the majority of the record displayed mild generalized background slowing. It is possible this represented excess drowsiness, though a component of mild diffuse or multifocal   cerebral dysfunction is possible (and/or sedative drug effect). There were no electrographic seizures, epileptiform discharges, or paroxysmal clinical events.     Read by:  Marck Rogel MD    Day 5:  3/26 -> 3/27 1023 (off EEG from 1326 -> 1706)  Pertinent medications: None  Background: The maximally awake background consisted of predominantly alpha/theta frequencies with an occasional normal AP gradient (otherwise absent). There was an occasional well-organized and well-modulated PDR of 9.5 Hz.    Focal abnormalities: none.   Voltage: 15-30 uV  Continuity: nearly continuous.  Variability: yes. 						  Reactivity: yes.							  Breach: no.  State changes: yes.					  Stage II transients: symmetric spindles and K complexes.   Epileptiform discharges: none.  Abnormal periodic/rhythmic activity: none.  Seizures or paroxysmal clinical events: none.  HV, photic, or other provocations: not performed.  Other EEG findings: none.     Daily summary and impression:    The background was occasionally normal, though the majority of the record displayed mild generalized background slowing. It is possible this represented excess drowsiness, though a component of mild diffuse or multifocal   cerebral dysfunction is possible (and/or sedative drug effect). There were no electrographic seizures, epileptiform discharges, or paroxysmal clinical events.     Read by:  Marck Rogel MD    Summary and Impression:    This was an abnormal continuous audiovisual EEG due to:    Initially moderate generalized background slowing suggestive of a similar degree of diffuse or multifocal  cerebral dysfunction. The slowing/dysfunction became mild on day 2. On day 4 the EEG occasionally appeared normal, though the majority of the day still contained mildly slow frequencies which may represent excess drowsiness or some component of mild diffuse or multifocal cerebral dysfunction (and/or sedative drug effect). Clinical correlation is advised. There were no electrographic seizures, epileptiform discharges, or paroxysmal clinical events.      Melissa Russell MD  Epileptologist  Read day 1    Felipe Cerda MD  Epileptologist  Read days 2 and 3        Marck Rogel MD  Epileptologist  Read day 4, 5

## 2018-03-29 NOTE — PROGRESS NOTE ADULT - SUBJECTIVE AND OBJECTIVE BOX
the patient seen in the morning - intubated, off pressor, still on CVVHD no issues - overnight on75 ml/h. Stopped this morning - discuss with the family and the team   Renal service follows for the need of fluid management in the setting of CHF, GILMER - oliguria         Patient seen and examined at bedside.     acetaminophen    Suspension. 650 milliGRAM(s) every 6 hours PRN  aspirin  chewable 81 milliGRAM(s) daily  atorvastatin 80 milliGRAM(s) at bedtime  chlorhexidine 0.12% Liquid 15 milliLiter(s) two times a day  chlorhexidine 2% Cloths 1 Application(s) daily  dextrose 5%. 1000 milliLiter(s) <Continuous>  dextrose 50% Injectable 12.5 Gram(s) once  dextrose 50% Injectable 25 Gram(s) once  dextrose 50% Injectable 25 Gram(s) once  dextrose Gel 1 Dose(s) once PRN  ergocalciferol Drops 6000 Unit(s) daily  escitalopram 5 milliGRAM(s) daily  folic acid 1 milliGRAM(s) daily  glucagon  Injectable 1 milliGRAM(s) once PRN  hydrocortisone sodium succinate Injectable 25 milliGRAM(s) every 8 hours  insulin glargine Injectable (LANTUS) 26 Unit(s) at bedtime  insulin lispro (HumaLOG) corrective regimen sliding scale   every 6 hours  modafinil 100 milliGRAM(s) <User Schedule>  multivitamin 1 Tablet(s) daily  pantoprazole   Suspension 40 milliGRAM(s) daily  sodium chloride 0.9% lock flush 10 milliLiter(s) every 1 hour PRN  sodium chloride 0.9% lock flush 10 milliLiter(s) every 12 hours PRN  sodium chloride 0.9% lock flush 20 milliLiter(s) once  thiamine 100 milliGRAM(s) daily      Allergies    No Known Allergies    Intolerances        T(C): , Max: 36.4 (03-28-18 @ 19:00)  T(F): , Max: 97.6 (03-28-18 @ 19:00)  HR: 94 (03-29-18 @ 15:00)  BP: 77/62 (03-28-18 @ 16:00)  BP(mean): 67 (03-28-18 @ 16:00)  RR: 14 (03-29-18 @ 15:00)  SpO2: 100% (03-29-18 @ 15:00)  Wt(kg): --    03-28 @ 07:01 - 03-29 @ 07:00  --------------------------------------------------------  IN:    Enteral Tube Flush: 330 mL    Glucerna: 1790 mL  Total IN: 2120 mL    OUT:    Other: 989 mL  Total OUT: 989 mL    Total NET: 1131 mL      03-29 @ 07:01 - 03-29 @ 15:36  --------------------------------------------------------  IN:    Enteral Tube Flush: 60 mL    Glucerna: 640 mL  Total IN: 700 mL    OUT:    Other: 84 mL  Total OUT: 84 mL    Total NET: 616 mL      Physical exam:   Intubated and more awake off sedation   No JVD   Normal air entry into the lungs, no wheezing, no crackles   RRR, normal s1, s2, no murmurs, rubs or gallops   Abdomen - soft, not tender, not distended   Extremities: no edema   HAs a HD catheter on the right of his neck          LABS:                        8.4    9.0   )-----------( 55       ( 29 Mar 2018 07:09 )             26.8     03-29    135  |  99  |  29<H>  ----------------------------<  140<H>  3.8   |  25  |  1.67<H>    Ca    8.3<L>      29 Mar 2018 07:09  Phos  2.1     03-29  Mg     1.9     03-29        PT/INR - ( 29 Mar 2018 07:09 )   PT: 37.6 sec;   INR: 3.31          PTT - ( 29 Mar 2018 07:09 )  PTT:44.9 sec          RADIOLOGY & ADDITIONAL STUDIES:

## 2018-03-29 NOTE — PROGRESS NOTE ADULT - SUBJECTIVE AND OBJECTIVE BOX
HUNTER BRIZUELA   MRN-[id number]     (1955):     HPI:  62 yo M history of HFrEF 10-15% 2/2 ischemic cardiomyopathy, MI , s/p AICD vs PPM, ?Afib, Hypertension, Diabetes Mellitus Type 2 on insulin, CKD?and gout, who presents with a chief complaint of generalized weakness. Pt wad admitted to [facility] 2 months ago for gout and was sent to rehab. He was discharged home mid Feb with VNS. In the past 2 weeks patient has noted increased leg pain and weakness bilaterally. In the last few days, he has also experienced generalized weakness prompting him to come to ER.   In ER, noted to have t max of 102, SBP 70s, leukocytosis to 32.8, Lactate of 6.6, Acute renal failure with severe metabolic derangements. Given 3.5L fluids and Vanc/Zosyn. MICU consulted. (10 Mar 2018 18:51)    Pt has had a complicated course including pleural effusion with chest tube placement, renal failure with both CVVHD and then HD and significant hypotension requiring vasopressors.  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  Pt was converted back to CVVHD due to his HD related hypotension.   Last night, pt had an episode of unresponsiveness.  Pt was intubated for airway protection.  Imaging was negative for CVA.  VEEG in place.  Pt remains on CVVHD and is on vasopressin and levophed.   He is lightly sedated at this time and can respond weakly to intermittent simple commands.     Pt remains intubated  He is off sedation and has a poor mental status.  Pt continues on CVVHD but is currently off pressors. He is hypothermic and  that is thought to be correlated with new IV fluid bags with CVVHD.   CVP around 12    Pt received Provigil this am and is more engaging, al be it briefly. Pt will follow some simple commands.      ROS:   nonverbal as pt is intubated.   Unable to attain due to:  oral intubation                     Dyspnea (Heriberto 0-10):                       N/V (Y/N):                            Secretions (Y/N):              Agitation(Y/N):  Pain (Y/N):       -Provocation/Palliation:  -Quality/Quantity:  -Radiating:  -Severity:  -Timing/Frequency:  -Impact on ADLs:    Allergies:  No Known Allergies    Intolerances    Opiate Naive (Y/N):   yes  -iStop reviewed (Y/N):  yes ref # [id number]    MEDICATIONS  (STANDING):    MEDICATIONS  (STANDING):  aspirin  chewable 81 milliGRAM(s) Oral daily  atorvastatin 80 milliGRAM(s) Oral at bedtime  chlorhexidine 0.12% Liquid 15 milliLiter(s) Swish and Spit two times a day  chlorhexidine 2% Cloths 1 Application(s) Topical daily  dextrose 5%. 1000 milliLiter(s) (50 mL/Hr) IV Continuous <Continuous>  dextrose 50% Injectable 12.5 Gram(s) IV Push once  dextrose 50% Injectable 25 Gram(s) IV Push once  dextrose 50% Injectable 25 Gram(s) IV Push once  ergocalciferol Drops 6000 Unit(s) Oral daily  escitalopram 5 milliGRAM(s) Oral daily  folic acid 1 milliGRAM(s) Oral daily  hydrocortisone sodium succinate Injectable 25 milliGRAM(s) IV Push every 8 hours  insulin glargine Injectable (LANTUS) 26 Unit(s) SubCutaneous at bedtime  insulin lispro (HumaLOG) corrective regimen sliding scale   SubCutaneous every 6 hours  metoprolol tartrate 12.5 milliGRAM(s) Oral every 12 hours  modafinil 100 milliGRAM(s) Oral <User Schedule>  multivitamin 1 Tablet(s) Oral daily  pantoprazole   Suspension 40 milliGRAM(s) Oral daily  sodium chloride 0.9% lock flush 20 milliLiter(s) IV Push once  thiamine 100 milliGRAM(s) Oral daily    MEDICATIONS  (PRN):  acetaminophen    Suspension. 650 milliGRAM(s) Oral every 6 hours PRN Moderate Pain (4 - 6)  dextrose Gel 1 Dose(s) Oral once PRN Blood Glucose LESS THAN 70 milliGRAM(s)/deciliter  glucagon  Injectable 1 milliGRAM(s) IntraMuscular once PRN Glucose LESS THAN 70 milligrams/deciliter  sodium chloride 0.9% lock flush 10 milliLiter(s) IV Push every 1 hour PRN After each medication administration  sodium chloride 0.9% lock flush 10 milliLiter(s) IV Push every 12 hours PRN Lumen of catheter NOT used    Labs:                                          8.3    10.4  )-----------( 61       ( 28 Mar 2018 06:21 )             26.5     03-    135  |  99  |  33<H>  ----------------------------<  218<H>  3.8   |  23  |  1.82<H>    Ca    8.2<L>      28 Mar 2018 06:21  Phos  2.9       Mg     1.8         TPro  5.8<L>  /  Alb  3.2<L>  /  TBili  1.4<H>  /  DBili  x   /  AST  23  /  ALT  27  /  AlkPhos  225<H>        Imaging:      Reviewed    < from: MR Head No Cont (18 @ 15:16) >  EXAM:  MR BRAIN                          PROCEDURE DATE:  2018           INTERPRETATION:  Clinical history: Aphasic and unresponsive.    Technique: MRI of the brain was performed utilizing sagittal and axial   T1, axial T2, axial gradient echo, axial and coronal FLAIR and diffusion   imaging.    There are no prior studies available for comparison.    Findings: There is a small 1.5 cm focus of T2 and FLAIR signal   hyperintensity in the left frontal cortex consistent with subacute to   chronic infarction. There are a few subcentimeter left parietal cortical   chronic infarctions. Small left temporal cortical chronic infarction   seen. There are small to moderate bilateral occipital chronic infarcts.   There is ex vacuo dilation of the atria and occipital horns.  There is no   evidence of acute infarction.      Mild patchy areas of low density within the periventricular white matter   and minimal patchy areas within the je consistent with mild   microvascular disease in a patient of this age. There is enlargement the   sulci, cisterns and ventricles consistent with mild cerebral and   cerebellar volume loss. There is superimposed prominence of the   ventricles.  There is no evidence of mass-effect or midline shift. There   is no evidence of intra or extra-axial fluid collection. The ventricles   are of normal size and caliber.the flow-voids to the left vertebral   artery is not well seen. Evaluation of the intracranial vascular   flow-voids appear within normal limits.    There are small bilateral polyps versus retention cyst within the   alveolar recesses of the maxillary sinuses. The remaining visualized   paranasal sinuses and bilateral mastoid air cells are clear.    Impression: Small left frontal cortical subacute to chronic infarction. A   few subcentimeter left parietal cortical chronic infarctions. Small left   temporal cortical chronic infarction Small to moderate bilateral   occipital chronic infarcts.  Mild microvascular disease.  No evidence of   acute infarction. Limited visualization of the left vertebral artery flow   void.    < end of copied text >    PEx:  ICU Vital Signs Last 24 Hrs  ICU Vital Signs Last 24 Hrs  T(C): 35.9 (29 Mar 2018 15:00), Max: 36.4 (28 Mar 2018 19:00)  T(F): 96.7 (29 Mar 2018 15:00), Max: 97.6 (28 Mar 2018 19:00)  HR: 100 (29 Mar 2018 18:00) (82 - 106)  BP: --  BP(mean): --  ABP: 94/60 (29 Mar 2018 18:00) (88/60 - 110/70)  ABP(mean): 70 (29 Mar 2018 18:00) (70 - 84)  RR: 15 (29 Mar 2018 18:00) (0 - 22)  SpO2: 100% (29 Mar 2018 18:00) (94% - 100%)      General:  ill appearing, thin, not distressed   HEENT:  nc/at, thin,  temporal wasting, moist oral cavity, orally intubated, + dobhoff tube, eyes open briefly  Neck:   supple, neg lymphadenopathy,  L IJ TLC  CVS:  SR,  rate controlled, pacer noted to  L chest wall, + R HD catheter  Resp:  non-labored, vented  GI:   large, distended slightly, soft, + BS nontender, dependent edema  :   no barnes present, + scrotal edema, anueric  Musc:   weak, thin, no spontaneous movement, weakly attempts to follow commands  Ext: crusty, dressed lower L extremities, L arm PICC, R hand restraint  Neuro: off sedation, following some simple commands, + waking to voice  Psych: maritza  Skin:  thin, dry, cool, + generalized edema  Lymph:  neg  Preadmit Karnofsky:   50 %           Current Karnofsky:   30    %  Cachexia (Y/N):   yes  BMI:  22    Advanced Directives:     Full Code     HCP noted on chart     DPOA  (for finances)       Decision maker:   pt was deemed not to have decision making capacity on  by psychiatry  Legal surrogate:  pts dgt Cristal Castro [phone] is pts HCP    Social History:   single, lives by self, pt has a HHA 4hrs/day, 3 days per week.  Pt has 2 adult children (Cristal aged 37) and pts son, is 24 and lives in [facility]. 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    He is retired but reports he is not on disability.   Pt is "spiritual" and practices a "[employer]" raquel background   Per notes, pt has not been compliant with his medication (not picked up prescription from his outpt pharmacy) since 2017.    GOALS OF CARE DISCUSSION:       Palliative care info/counseling provided-- following	           Family meeting- pts dgt//HCP Cristal will be present at 300pm to meet with team on        Advanced Directives addressed please see Advance Care Planning Note-- 	           Documentation of GOC:  trial of critical care; family considering options if pt does not improve          REFERRALS:	        Palliative Med        Unit SW/Case Mgmt       - referred       Massage Therapy-- referred       Music Therapy-- referred       Nutrition-- following

## 2018-03-29 NOTE — PROGRESS NOTE ADULT - SUBJECTIVE AND OBJECTIVE BOX
INTERVAL HPI/OVERNIGHT EVENTS: continued on cvvhd    SUBJECTIVE: Patient seen and examined at bedside. not opening eyesto verbal stimuli    OBJECTIVE:    VITAL SIGNS:  ICU Vital Signs Last 24 Hrs  T(C): 35.9 (29 Mar 2018 11:00), Max: 36.4 (28 Mar 2018 19:00)  T(F): 96.7 (29 Mar 2018 11:00), Max: 97.6 (28 Mar 2018 19:00)  HR: 82 (29 Mar 2018 12:00) (74 - 95)  BP: 77/62 (28 Mar 2018 16:00) (73/60 - 80/64)  BP(mean): 67 (28 Mar 2018 16:00) (67 - 71)  ABP: 100/62 (29 Mar 2018 12:00) (88/58 - 110/70)  ABP(mean): 76 (29 Mar 2018 12:00) (68 - 84)  RR: 13 (29 Mar 2018 12:00) (0 - 22)  SpO2: 100% (29 Mar 2018 12:00) (100% - 100%)    Mode: AC/ CMV (Assist Control/ Continuous Mandatory Ventilation), RR (machine): 14, TV (machine): 500, FiO2: 40, PEEP: 5, ITime: 0.9, MAP: 8, PIP: 20    03-28 @ 07:01 - 03-29 @ 07:00  --------------------------------------------------------  IN: 2120 mL / OUT: 989 mL / NET: 1131 mL    03-29 @ 07:01 - 03-29 @ 12:50  --------------------------------------------------------  IN: 460 mL / OUT: 84 mL / NET: 376 mL      CAPILLARY BLOOD GLUCOSE      POCT Blood Glucose.: 138 mg/dL (29 Mar 2018 11:07)      PHYSICAL EXAM:    General: NAD  HEENT: NC/AT; PERRL, clear conjunctiva  Neck: supple  Respiratory: CTA b/l  Cardiovascular: +S1/S2; RRR  Abdomen: soft, NT/ND; +BS x4  Extremities:  no LE edema  Vascular: WWP, 2+ peripheral pulses b/l;  Skin: normal color and turgor; no rash  Neurological: A&Ox3, move all extremities. CN II-XII intact    MEDICATIONS:  MEDICATIONS  (STANDING):  aspirin  chewable 81 milliGRAM(s) Oral daily  atorvastatin 80 milliGRAM(s) Oral at bedtime  chlorhexidine 0.12% Liquid 15 milliLiter(s) Swish and Spit two times a day  chlorhexidine 2% Cloths 1 Application(s) Topical daily  dextrose 5%. 1000 milliLiter(s) (50 mL/Hr) IV Continuous <Continuous>  dextrose 50% Injectable 12.5 Gram(s) IV Push once  dextrose 50% Injectable 25 Gram(s) IV Push once  dextrose 50% Injectable 25 Gram(s) IV Push once  ergocalciferol Drops 6000 Unit(s) Oral daily  escitalopram 5 milliGRAM(s) Oral daily  folic acid 1 milliGRAM(s) Oral daily  hydrocortisone sodium succinate Injectable 25 milliGRAM(s) IV Push every 8 hours  insulin glargine Injectable (LANTUS) 26 Unit(s) SubCutaneous at bedtime  insulin lispro (HumaLOG) corrective regimen sliding scale   SubCutaneous every 6 hours  modafinil 100 milliGRAM(s) Oral <User Schedule>  multivitamin 1 Tablet(s) Oral daily  pantoprazole   Suspension 40 milliGRAM(s) Oral daily  sodium chloride 0.9% lock flush 20 milliLiter(s) IV Push once  thiamine 100 milliGRAM(s) Oral daily    MEDICATIONS  (PRN):  acetaminophen    Suspension. 650 milliGRAM(s) Oral every 6 hours PRN Moderate Pain (4 - 6)  dextrose Gel 1 Dose(s) Oral once PRN Blood Glucose LESS THAN 70 milliGRAM(s)/deciliter  glucagon  Injectable 1 milliGRAM(s) IntraMuscular once PRN Glucose LESS THAN 70 milligrams/deciliter  sodium chloride 0.9% lock flush 10 milliLiter(s) IV Push every 1 hour PRN After each medication administration  sodium chloride 0.9% lock flush 10 milliLiter(s) IV Push every 12 hours PRN Lumen of catheter NOT used      ALLERGIES:  Allergies    No Known Allergies    Intolerances        LABS:                        8.4    9.0   )-----------( 55       ( 29 Mar 2018 07:09 )             26.8     03-29    135  |  99  |  29<H>  ----------------------------<  140<H>  3.8   |  25  |  1.67<H>    Ca    8.3<L>      29 Mar 2018 07:09  Phos  2.1     03-29  Mg     1.9     03-29      PT/INR - ( 29 Mar 2018 07:09 )   PT: 37.6 sec;   INR: 3.31          PTT - ( 29 Mar 2018 07:09 )  PTT:44.9 sec      RADIOLOGY & ADDITIONAL TESTS: Reviewed. INTERVAL HPI/OVERNIGHT EVENTS: continued on cvvhd    SUBJECTIVE: Patient seen and examined at bedside. not opening eyesto verbal stimuli    OBJECTIVE:    VITAL SIGNS:  ICU Vital Signs Last 24 Hrs  T(C): 35.9 (29 Mar 2018 11:00), Max: 36.4 (28 Mar 2018 19:00)  T(F): 96.7 (29 Mar 2018 11:00), Max: 97.6 (28 Mar 2018 19:00)  HR: 82 (29 Mar 2018 12:00) (74 - 95)  BP: 77/62 (28 Mar 2018 16:00) (73/60 - 80/64)  BP(mean): 67 (28 Mar 2018 16:00) (67 - 71)  ABP: 100/62 (29 Mar 2018 12:00) (88/58 - 110/70)  ABP(mean): 76 (29 Mar 2018 12:00) (68 - 84)  RR: 13 (29 Mar 2018 12:00) (0 - 22)  SpO2: 100% (29 Mar 2018 12:00) (100% - 100%)    Mode: AC/ CMV (Assist Control/ Continuous Mandatory Ventilation), RR (machine): 14, TV (machine): 500, FiO2: 40, PEEP: 5, ITime: 0.9, MAP: 8, PIP: 20    03-28 @ 07:01 - 03-29 @ 07:00  --------------------------------------------------------  IN: 2120 mL / OUT: 989 mL / NET: 1131 mL    03-29 @ 07:01 - 03-29 @ 12:50  --------------------------------------------------------  IN: 460 mL / OUT: 84 mL / NET: 376 mL      CAPILLARY BLOOD GLUCOSE      POCT Blood Glucose.: 138 mg/dL (29 Mar 2018 11:07)      PHYSICAL EXAM:    General: Intubated.  HEENT: NC/AT; horizontal nystagmus. no tracking. PEERL  Neck: supple, R HD cath, L IJ  Respiratory: diffuse rhonchi   Cardiovascular: +S1/S2; RRR, ii/vi systolic murmur   Abdomen: soft, distended; Dull on percussion. +BS x4  : Decreased testicular edema   Extremities:  2+ LE edema. b/l venous stasis changes. cool to touch   Vascular: WWP, 2+ peripheral pulses b/l;  Skin: normal color and turgor; no rash  Neuro: Does not open eyes or follow commands     MEDICATIONS:  MEDICATIONS  (STANDING):  aspirin  chewable 81 milliGRAM(s) Oral daily  atorvastatin 80 milliGRAM(s) Oral at bedtime  chlorhexidine 0.12% Liquid 15 milliLiter(s) Swish and Spit two times a day  chlorhexidine 2% Cloths 1 Application(s) Topical daily  dextrose 5%. 1000 milliLiter(s) (50 mL/Hr) IV Continuous <Continuous>  dextrose 50% Injectable 12.5 Gram(s) IV Push once  dextrose 50% Injectable 25 Gram(s) IV Push once  dextrose 50% Injectable 25 Gram(s) IV Push once  ergocalciferol Drops 6000 Unit(s) Oral daily  escitalopram 5 milliGRAM(s) Oral daily  folic acid 1 milliGRAM(s) Oral daily  hydrocortisone sodium succinate Injectable 25 milliGRAM(s) IV Push every 8 hours  insulin glargine Injectable (LANTUS) 26 Unit(s) SubCutaneous at bedtime  insulin lispro (HumaLOG) corrective regimen sliding scale   SubCutaneous every 6 hours  modafinil 100 milliGRAM(s) Oral <User Schedule>  multivitamin 1 Tablet(s) Oral daily  pantoprazole   Suspension 40 milliGRAM(s) Oral daily  sodium chloride 0.9% lock flush 20 milliLiter(s) IV Push once  thiamine 100 milliGRAM(s) Oral daily    MEDICATIONS  (PRN):  acetaminophen    Suspension. 650 milliGRAM(s) Oral every 6 hours PRN Moderate Pain (4 - 6)  dextrose Gel 1 Dose(s) Oral once PRN Blood Glucose LESS THAN 70 milliGRAM(s)/deciliter  glucagon  Injectable 1 milliGRAM(s) IntraMuscular once PRN Glucose LESS THAN 70 milligrams/deciliter  sodium chloride 0.9% lock flush 10 milliLiter(s) IV Push every 1 hour PRN After each medication administration  sodium chloride 0.9% lock flush 10 milliLiter(s) IV Push every 12 hours PRN Lumen of catheter NOT used      ALLERGIES:  Allergies    No Known Allergies    Intolerances        LABS:                        8.4    9.0   )-----------( 55       ( 29 Mar 2018 07:09 )             26.8     03-29    135  |  99  |  29<H>  ----------------------------<  140<H>  3.8   |  25  |  1.67<H>    Ca    8.3<L>      29 Mar 2018 07:09  Phos  2.1     03-29  Mg     1.9     03-29      PT/INR - ( 29 Mar 2018 07:09 )   PT: 37.6 sec;   INR: 3.31          PTT - ( 29 Mar 2018 07:09 )  PTT:44.9 sec      RADIOLOGY & ADDITIONAL TESTS: Reviewed.

## 2018-03-29 NOTE — CHART NOTE - NSCHARTNOTEFT_GEN_A_CORE
Admitting Diagnosis:   63M PMH HFrEF 10-15% (ischemic), MI, s/p AICD vs PPM, possible Afib, HTN, DM2 on insulin, possible CKD, and gout, who presents with a chief complaint of generalized weakness s/p septic shock likely 2/2 LE cellulitis. Now with AMS and new leukocytisis likely 2/2 UTI     PAST MEDICAL & SURGICAL HISTORY:  Type 2 diabetes mellitus with diabetic peripheral angiopathy without gangrene, with long-term curren  Essential hypertension, benign  Gout  Pacemaker  Chronic systolic heart failure  Myocardial infarction  No significant past surgical history      Current Nutrition Order:  Glucerna 1.2 at 80 mL x 24 hr (via NGT) to provide 1920 mL TV, 2304 kcal, 115 g protein, & 1546 mL fluid. Currently running at goal rate       PO Intake: Good (%) [   ]  Fair (50-75%) [   ] Poor (<25%) [   ]- N/A TF dependent     GI Issues: Good tolerance at this time per RN    Pain: Unable to assess at this time 2/2 vent     Skin Integrity: Stage 2- L buttocks    Labs:   03-29    135  |  99  |  29<H>  ----------------------------<  140<H>  3.8   |  25  |  1.67<H>    Ca    8.3<L>      29 Mar 2018 07:09  Phos  2.1     03-29  Mg     1.9     03-29      CAPILLARY BLOOD GLUCOSE      POCT Blood Glucose.: 138 mg/dL (29 Mar 2018 11:07)  POCT Blood Glucose.: 129 mg/dL (29 Mar 2018 06:34)  POCT Blood Glucose.: 204 mg/dL (28 Mar 2018 21:39)  POCT Blood Glucose.: 222 mg/dL (28 Mar 2018 18:48)      Medications:  MEDICATIONS  (STANDING):  aspirin  chewable 81 milliGRAM(s) Oral daily  atorvastatin 80 milliGRAM(s) Oral at bedtime  chlorhexidine 0.12% Liquid 15 milliLiter(s) Swish and Spit two times a day  chlorhexidine 2% Cloths 1 Application(s) Topical daily  dextrose 5%. 1000 milliLiter(s) (50 mL/Hr) IV Continuous <Continuous>  dextrose 50% Injectable 12.5 Gram(s) IV Push once  dextrose 50% Injectable 25 Gram(s) IV Push once  dextrose 50% Injectable 25 Gram(s) IV Push once  ergocalciferol Drops 6000 Unit(s) Oral daily  escitalopram 5 milliGRAM(s) Oral daily  folic acid 1 milliGRAM(s) Oral daily  hydrocortisone sodium succinate Injectable 25 milliGRAM(s) IV Push every 8 hours  insulin glargine Injectable (LANTUS) 26 Unit(s) SubCutaneous at bedtime  insulin lispro (HumaLOG) corrective regimen sliding scale   SubCutaneous every 6 hours  modafinil 100 milliGRAM(s) Oral <User Schedule>  multivitamin 1 Tablet(s) Oral daily  pantoprazole   Suspension 40 milliGRAM(s) Oral daily  sodium chloride 0.9% lock flush 20 milliLiter(s) IV Push once  thiamine 100 milliGRAM(s) Oral daily    MEDICATIONS  (PRN):  acetaminophen    Suspension. 650 milliGRAM(s) Oral every 6 hours PRN Moderate Pain (4 - 6)  dextrose Gel 1 Dose(s) Oral once PRN Blood Glucose LESS THAN 70 milliGRAM(s)/deciliter  glucagon  Injectable 1 milliGRAM(s) IntraMuscular once PRN Glucose LESS THAN 70 milligrams/deciliter  sodium chloride 0.9% lock flush 10 milliLiter(s) IV Push every 1 hour PRN After each medication administration  sodium chloride 0.9% lock flush 10 milliLiter(s) IV Push every 12 hours PRN Lumen of catheter NOT used      Weight:  3/16 93.4 kg  3/17 85.4 kg  3/19 85.9 kg  3/20 90.1 kg    Weight Change: No new weights to assess since 3/20    Estimated energy needs using 89 kg IBW: Needs estimated 2/2 vent, CVVH  Calories: 25-30 kcal/kg = 8510-0174 kcal/day  Protein: 1.4-1.6 g/kg = 125-142 g protein/day  Fluids: 1000 mL + UOP 2/2 HD    Subjective: Pt intubated, off of pressor support, but remains on CVVH. MAP 69. TF running at goal. Palliative following closely and goals of care discussions being held with family- will continue to monitor for goals of care.     Previous Nutrition Diagnosis: Inadequate energy intake RT inability to take PO AEB NPO/intubation    Active [ X  ]  Resolved [   ]    Goal: Meet % of nutrition needs via tolerated route.     Recommendations:  1. Continue with current TF order; monitor for s/s intolerance   2. Trend daily weights    Education: N/A-vent    Risk Level: High [ X  ] Moderate [   ] Low [   ]

## 2018-03-29 NOTE — PROGRESS NOTE ADULT - PROBLEM SELECTOR PLAN 4
family aware of new finding of infarcts. Family wishes to continue supportive care to allow pt more time for recovery

## 2018-03-29 NOTE — PROGRESS NOTE ADULT - ASSESSMENT
64 yo M history of HFrEF 10-15% 2/2 ischemic cardiomyopathy, MI , s/p AICD vs PPM, ?Afib, Hypertension, Diabetes Mellitus Type 2 on insulin, CKD and gout, who presents with a chief complaint of generalized weakness admitted for severe sepsis 2/2 LE cellulitis vs pneumonia   Pt has developed renal failure (GILMER) and was started on CVVHD, converted to HD but is hypotensive with same.    Pt has a neurological event on Friday March 23.  He was intubated for airway protection.  His ental status remains poor, off sedation.  No seizure.  No CVA.     HCP to participate in a family meeting on Wednesday March 28 to provide updates and explore pts goals and wishes.

## 2018-03-29 NOTE — PROGRESS NOTE ADULT - ASSESSMENT
62 yo M history of HFrEF 10-15% 2/2 ischemic cardiomyopathy, MI , s/p AICD vs PPM, ?Afib, Hypertension, Diabetes Mellitus Type 2 on insulin, CKD?and gout, who presents with a chief complaint of generalized weakness. Now shakira due to ATN, not improving, BP more stable off pressors. We will stop the CVVHD and we will try HD on 3/30

## 2018-03-29 NOTE — PROGRESS NOTE ADULT - ATTENDING COMMENTS
CVP 6, bp improved to sys 100's off both pressors for >24hrs now. Stopped CRRT. Will assess need for dialysis tomorrow, goal to maintain pt net neutral fluid balance, still anuric. MS improving. updated family. Hypophosphatemic, replete.

## 2018-03-30 LAB
ALBUMIN SERPL ELPH-MCNC: 3.1 G/DL — LOW (ref 3.3–5)
ALP SERPL-CCNC: 505 U/L — HIGH (ref 40–120)
ALT FLD-CCNC: 219 U/L — HIGH (ref 10–45)
ANION GAP SERPL CALC-SCNC: 12 MMOL/L — SIGNIFICANT CHANGE UP (ref 5–17)
APTT BLD: 41.7 SEC — HIGH (ref 27.5–37.4)
AST SERPL-CCNC: 205 U/L — HIGH (ref 10–40)
BILIRUB SERPL-MCNC: 1.2 MG/DL — SIGNIFICANT CHANGE UP (ref 0.2–1.2)
BUN SERPL-MCNC: 38 MG/DL — HIGH (ref 7–23)
CALCIUM SERPL-MCNC: 8.2 MG/DL — LOW (ref 8.4–10.5)
CHLORIDE SERPL-SCNC: 98 MMOL/L — SIGNIFICANT CHANGE UP (ref 96–108)
CO2 SERPL-SCNC: 26 MMOL/L — SIGNIFICANT CHANGE UP (ref 22–31)
CREAT SERPL-MCNC: 2.41 MG/DL — HIGH (ref 0.5–1.3)
GGT SERPL-CCNC: 387 U/L — HIGH (ref 9–50)
GLUCOSE BLDC GLUCOMTR-MCNC: 142 MG/DL — HIGH (ref 70–99)
GLUCOSE BLDC GLUCOMTR-MCNC: 147 MG/DL — HIGH (ref 70–99)
GLUCOSE BLDC GLUCOMTR-MCNC: 164 MG/DL — HIGH (ref 70–99)
GLUCOSE BLDC GLUCOMTR-MCNC: 203 MG/DL — HIGH (ref 70–99)
GLUCOSE SERPL-MCNC: 139 MG/DL — HIGH (ref 70–99)
HCT VFR BLD CALC: 27.9 % — LOW (ref 39–50)
HGB BLD-MCNC: 8.7 G/DL — LOW (ref 13–17)
INR BLD: 2.69 — HIGH (ref 0.88–1.16)
MAGNESIUM SERPL-MCNC: 1.9 MG/DL — SIGNIFICANT CHANGE UP (ref 1.6–2.6)
MCHC RBC-ENTMCNC: 31.2 G/DL — LOW (ref 32–36)
MCHC RBC-ENTMCNC: 31.2 PG — SIGNIFICANT CHANGE UP (ref 27–34)
MCV RBC AUTO: 100 FL — SIGNIFICANT CHANGE UP (ref 80–100)
PHOSPHATE SERPL-MCNC: 3 MG/DL — SIGNIFICANT CHANGE UP (ref 2.5–4.5)
PLATELET # BLD AUTO: 58 K/UL — LOW (ref 150–400)
POTASSIUM SERPL-MCNC: 4.3 MMOL/L — SIGNIFICANT CHANGE UP (ref 3.5–5.3)
POTASSIUM SERPL-SCNC: 4.3 MMOL/L — SIGNIFICANT CHANGE UP (ref 3.5–5.3)
PROT SERPL-MCNC: 5.7 G/DL — LOW (ref 6–8.3)
PROTHROM AB SERPL-ACNC: 30.5 SEC — HIGH (ref 9.8–12.7)
RBC # BLD: 2.79 M/UL — LOW (ref 4.2–5.8)
RBC # FLD: 20.5 % — HIGH (ref 10.3–16.9)
SODIUM SERPL-SCNC: 136 MMOL/L — SIGNIFICANT CHANGE UP (ref 135–145)
WBC # BLD: 8 K/UL — SIGNIFICANT CHANGE UP (ref 3.8–10.5)
WBC # FLD AUTO: 8 K/UL — SIGNIFICANT CHANGE UP (ref 3.8–10.5)

## 2018-03-30 PROCEDURE — 99291 CRITICAL CARE FIRST HOUR: CPT

## 2018-03-30 PROCEDURE — 76705 ECHO EXAM OF ABDOMEN: CPT | Mod: 26

## 2018-03-30 PROCEDURE — 71045 X-RAY EXAM CHEST 1 VIEW: CPT | Mod: 26

## 2018-03-30 PROCEDURE — 95813 EEG EXTND MNTR 61-119 MIN: CPT | Mod: 26

## 2018-03-30 PROCEDURE — 99233 SBSQ HOSP IP/OBS HIGH 50: CPT | Mod: GC

## 2018-03-30 PROCEDURE — 90937 HEMODIALYSIS REPEATED EVAL: CPT

## 2018-03-30 PROCEDURE — 99231 SBSQ HOSP IP/OBS SF/LOW 25: CPT

## 2018-03-30 RX ORDER — ALBUMIN HUMAN 25 %
50 VIAL (ML) INTRAVENOUS ONCE
Qty: 0 | Refills: 0 | Status: DISCONTINUED | OUTPATIENT
Start: 2018-03-30 | End: 2018-03-30

## 2018-03-30 RX ORDER — WARFARIN SODIUM 2.5 MG/1
2.5 TABLET ORAL ONCE
Qty: 0 | Refills: 0 | Status: COMPLETED | OUTPATIENT
Start: 2018-03-30 | End: 2018-03-30

## 2018-03-30 RX ORDER — ALBUMIN HUMAN 25 %
50 VIAL (ML) INTRAVENOUS ONCE
Qty: 0 | Refills: 0 | Status: COMPLETED | OUTPATIENT
Start: 2018-03-30 | End: 2018-03-30

## 2018-03-30 RX ORDER — ALBUMIN HUMAN 25 %
VIAL (ML) INTRAVENOUS
Qty: 0 | Refills: 0 | Status: COMPLETED | OUTPATIENT
Start: 2018-03-30 | End: 2018-03-30

## 2018-03-30 RX ORDER — HYDROCORTISONE 20 MG
25 TABLET ORAL ONCE
Qty: 0 | Refills: 0 | Status: COMPLETED | OUTPATIENT
Start: 2018-03-30 | End: 2018-03-30

## 2018-03-30 RX ORDER — NOREPINEPHRINE BITARTRATE/D5W 8 MG/250ML
0.01 PLASTIC BAG, INJECTION (ML) INTRAVENOUS
Qty: 8 | Refills: 0 | Status: DISCONTINUED | OUTPATIENT
Start: 2018-03-30 | End: 2018-04-05

## 2018-03-30 RX ORDER — ALBUMIN HUMAN 25 %
VIAL (ML) INTRAVENOUS
Qty: 0 | Refills: 0 | Status: DISCONTINUED | OUTPATIENT
Start: 2018-03-30 | End: 2018-03-30

## 2018-03-30 RX ORDER — METOPROLOL TARTRATE 50 MG
12.5 TABLET ORAL EVERY 12 HOURS
Qty: 0 | Refills: 0 | Status: DISCONTINUED | OUTPATIENT
Start: 2018-03-30 | End: 2018-04-02

## 2018-03-30 RX ORDER — HYDROCORTISONE 20 MG
25 TABLET ORAL EVERY 12 HOURS
Qty: 0 | Refills: 0 | Status: DISCONTINUED | OUTPATIENT
Start: 2018-03-31 | End: 2018-03-31

## 2018-03-30 RX ORDER — METOCLOPRAMIDE HCL 10 MG
5 TABLET ORAL EVERY 8 HOURS
Qty: 0 | Refills: 0 | Status: DISCONTINUED | OUTPATIENT
Start: 2018-03-30 | End: 2018-04-05

## 2018-03-30 RX ADMIN — Medication 50 MILLILITER(S): at 16:13

## 2018-03-30 RX ADMIN — Medication 81 MILLIGRAM(S): at 12:02

## 2018-03-30 RX ADMIN — Medication 5 MILLIGRAM(S): at 14:41

## 2018-03-30 RX ADMIN — Medication 4: at 22:47

## 2018-03-30 RX ADMIN — PANTOPRAZOLE SODIUM 40 MILLIGRAM(S): 20 TABLET, DELAYED RELEASE ORAL at 12:02

## 2018-03-30 RX ADMIN — Medication 25 MILLIGRAM(S): at 12:01

## 2018-03-30 RX ADMIN — CHLORHEXIDINE GLUCONATE 15 MILLILITER(S): 213 SOLUTION TOPICAL at 22:20

## 2018-03-30 RX ADMIN — Medication 25 MILLIGRAM(S): at 18:51

## 2018-03-30 RX ADMIN — Medication 2: at 12:01

## 2018-03-30 RX ADMIN — MODAFINIL 100 MILLIGRAM(S): 200 TABLET ORAL at 07:46

## 2018-03-30 RX ADMIN — Medication 1 TABLET(S): at 12:02

## 2018-03-30 RX ADMIN — Medication 5 MILLIGRAM(S): at 22:20

## 2018-03-30 RX ADMIN — Medication 12.5 MILLIGRAM(S): at 18:52

## 2018-03-30 RX ADMIN — Medication 25 MILLIGRAM(S): at 02:01

## 2018-03-30 RX ADMIN — Medication 100 MILLIGRAM(S): at 12:02

## 2018-03-30 RX ADMIN — MODAFINIL 100 MILLIGRAM(S): 200 TABLET ORAL at 12:03

## 2018-03-30 RX ADMIN — CHLORHEXIDINE GLUCONATE 1 APPLICATION(S): 213 SOLUTION TOPICAL at 12:03

## 2018-03-30 RX ADMIN — CHLORHEXIDINE GLUCONATE 15 MILLILITER(S): 213 SOLUTION TOPICAL at 12:01

## 2018-03-30 RX ADMIN — Medication 1 MILLIGRAM(S): at 12:02

## 2018-03-30 RX ADMIN — INSULIN GLARGINE 26 UNIT(S): 100 INJECTION, SOLUTION SUBCUTANEOUS at 22:21

## 2018-03-30 RX ADMIN — ESCITALOPRAM OXALATE 5 MILLIGRAM(S): 10 TABLET, FILM COATED ORAL at 12:02

## 2018-03-30 RX ADMIN — Medication 50 MILLILITER(S): at 18:06

## 2018-03-30 RX ADMIN — ERGOCALCIFEROL 6000 UNIT(S): 1.25 CAPSULE ORAL at 12:02

## 2018-03-30 RX ADMIN — Medication 12.5 MILLIGRAM(S): at 04:00

## 2018-03-30 RX ADMIN — WARFARIN SODIUM 2.5 MILLIGRAM(S): 2.5 TABLET ORAL at 22:19

## 2018-03-30 NOTE — PROGRESS NOTE ADULT - SUBJECTIVE AND OBJECTIVE BOX
CARDIOLOGY FELLOW PROGRESS NOTE    Subjective:     Patient intubated and sedated. He was transitioned to HD today. CVVH stopped yday.     Vitals:  T(C): 37.4 (03-30-18 @ 10:00), Max: 37.4 (03-30-18 @ 10:00)  HR: 101 (03-30-18 @ 13:05) (91 - 116)  BP: --  RR: 14 (03-30-18 @ 13:00) (11 - 90)  SpO2: 100% (03-30-18 @ 13:05) (68% - 100%)  Wt(kg): --  I&O's Summary    29 Mar 2018 07:01  -  30 Mar 2018 07:00  --------------------------------------------------------  IN: 1650 mL / OUT: 84 mL / NET: 1566 mL    30 Mar 2018 07:01  -  30 Mar 2018 14:40  --------------------------------------------------------  IN: 350 mL / OUT: 0 mL / NET: 350 mL    PHYSICAL EXAM:  Appearance: Normal	  HEENT:   Normal oral mucosa, PERRL, EOMI	  Lymphatic: No lymphadenopathy  Cardiovascular: Normal S1 S2, No JVD, No murmurs, No edema  Respiratory: Lungs clear to auscultation	  Psychiatry: A & O x 3, Mood & affect appropriate  Gastrointestinal:  Soft, Non-tender, + BS	  Skin: No rashes, No ecchymoses, No cyanosis  Neurologic: Non-focal  Extremities: Normal range of motion, No clubbing, cyanosis or edema  Vascular: Peripheral pulses palpable 2+ bilaterally    CURRENT MEDICATIONS:  metoprolol tartrate 12.5 milliGRAM(s) Oral every 12 hours  norepinephrine Infusion 0.01 MICROgram(s)/kG/Min IV Continuous <Continuous>        acetaminophen    Suspension. 650 milliGRAM(s) Oral every 6 hours PRN  escitalopram 5 milliGRAM(s) Oral daily  metoclopramide Injectable 5 milliGRAM(s) IV Push every 8 hours  modafinil 100 milliGRAM(s) Oral <User Schedule>    pantoprazole   Suspension 40 milliGRAM(s) Oral daily    dextrose 50% Injectable 12.5 Gram(s) IV Push once  dextrose 50% Injectable 25 Gram(s) IV Push once  dextrose 50% Injectable 25 Gram(s) IV Push once  dextrose Gel 1 Dose(s) Oral once PRN  glucagon  Injectable 1 milliGRAM(s) IntraMuscular once PRN  hydrocortisone sodium succinate Injectable 25 milliGRAM(s) IV Push every 8 hours  insulin glargine Injectable (LANTUS) 26 Unit(s) SubCutaneous at bedtime  insulin lispro (HumaLOG) corrective regimen sliding scale   SubCutaneous every 6 hours    albumin human 25% IVPB 50 milliLiter(s) IV Intermittent once  albumin human 25% IVPB 50 milliLiter(s) IV Intermittent once  albumin human 25% IVPB      aspirin  chewable 81 milliGRAM(s) Oral daily  chlorhexidine 0.12% Liquid 15 milliLiter(s) Swish and Spit two times a day  chlorhexidine 2% Cloths 1 Application(s) Topical daily  dextrose 5%. 1000 milliLiter(s) IV Continuous <Continuous>  ergocalciferol Drops 6000 Unit(s) Oral daily  folic acid 1 milliGRAM(s) Oral daily  multivitamin 1 Tablet(s) Oral daily  sodium chloride 0.9% lock flush 10 milliLiter(s) IV Push every 1 hour PRN  sodium chloride 0.9% lock flush 10 milliLiter(s) IV Push every 12 hours PRN  sodium chloride 0.9% lock flush 20 milliLiter(s) IV Push once  thiamine 100 milliGRAM(s) Oral daily      LABS:	 	             8.7    8.0   )-----------( 58       ( 30 Mar 2018 06:37 )             27.9     03-30    136  |  98  |  38<H>  ----------------------------<  139<H>  4.3   |  26  |  2.41<H>    Ca    8.2<L>      30 Mar 2018 06:37  Phos  3.0     03-30  Mg     1.9     03-30    TPro  5.7<L>  /  Alb  3.1<L>  /  TBili  1.2  /  DBili  x   /  AST  205<H>  /  ALT  219<H>  /  AlkPhos  505<H>  03-30

## 2018-03-30 NOTE — PROGRESS NOTE ADULT - PROBLEM SELECTOR PLAN 2
Acute on chronic systolic CHF with LVEF 15 % and severely low blood pressure  Daily weight - please limit the fluid intake   - and follow up closely the fluid intake - please

## 2018-03-30 NOTE — PROGRESS NOTE ADULT - SUBJECTIVE AND OBJECTIVE BOX
Neurology Stroke Follow Up     Interval History:  Patient seen and examined.  Still altered despite starting Modafinil    Medications:  MEDICATIONS  (STANDING):  aspirin  chewable 81 milliGRAM(s) Oral daily  chlorhexidine 0.12% Liquid 15 milliLiter(s) Swish and Spit two times a day  chlorhexidine 2% Cloths 1 Application(s) Topical daily  dextrose 5%. 1000 milliLiter(s) (50 mL/Hr) IV Continuous <Continuous>  dextrose 50% Injectable 12.5 Gram(s) IV Push once  dextrose 50% Injectable 25 Gram(s) IV Push once  dextrose 50% Injectable 25 Gram(s) IV Push once  ergocalciferol Drops 6000 Unit(s) Oral daily  escitalopram 5 milliGRAM(s) Oral daily  folic acid 1 milliGRAM(s) Oral daily  hydrocortisone sodium succinate Injectable 25 milliGRAM(s) IV Push once  insulin glargine Injectable (LANTUS) 26 Unit(s) SubCutaneous at bedtime  insulin lispro (HumaLOG) corrective regimen sliding scale   SubCutaneous every 6 hours  metoclopramide Injectable 5 milliGRAM(s) IV Push every 8 hours  metoprolol tartrate 12.5 milliGRAM(s) Oral every 12 hours  modafinil 100 milliGRAM(s) Oral <User Schedule>  multivitamin 1 Tablet(s) Oral daily  norepinephrine Infusion 0.01 MICROgram(s)/kG/Min (1.5 mL/Hr) IV Continuous <Continuous>  pantoprazole   Suspension 40 milliGRAM(s) Oral daily  sodium chloride 0.9% lock flush 20 milliLiter(s) IV Push once  thiamine 100 milliGRAM(s) Oral daily  warfarin 2.5 milliGRAM(s) Oral once    MEDICATIONS  (PRN):  acetaminophen    Suspension. 650 milliGRAM(s) Oral every 6 hours PRN Moderate Pain (4 - 6)  dextrose Gel 1 Dose(s) Oral once PRN Blood Glucose LESS THAN 70 milliGRAM(s)/deciliter  glucagon  Injectable 1 milliGRAM(s) IntraMuscular once PRN Glucose LESS THAN 70 milligrams/deciliter  sodium chloride 0.9% lock flush 10 milliLiter(s) IV Push every 1 hour PRN After each medication administration  sodium chloride 0.9% lock flush 10 milliLiter(s) IV Push every 12 hours PRN Lumen of catheter NOT used    Allergies    No Known Allergies    Intolerances        Exam:  Vital Signs Last 24 Hrs  T(C): 37.4 (30 Mar 2018 14:00), Max: 37.4 (30 Mar 2018 10:00)  T(F): 99.3 (30 Mar 2018 14:00), Max: 99.4 (30 Mar 2018 10:00)  HR: 99 (30 Mar 2018 16:05) (90 - 116)  BP: --  BP(mean): --  RR: 15 (30 Mar 2018 16:05) (11 - 90)  SpO2: 100% (30 Mar 2018 16:05) (68% - 100%)    Gen: Lying in bed, calm, cooperative, in NAD  Neuro:  Mental status: slight response to pain to shoulder, not following commands   Cranial nerves: Pupils equally round and reactive to light, eyes midline, oculocephalic reflex without nystagmus, unable to assess facial droop due to ETT    Motor:  no spontaneous movement  Sensation: not much response   Coordination:uncooperative  Gait: deferred    Labs:                        8.7    8.0   )-----------( 58       ( 30 Mar 2018 06:37 )             27.9     03-30    136  |  98  |  38<H>  ----------------------------<  139<H>  4.3   |  26  |  2.41<H>    Ca    8.2<L>      30 Mar 2018 06:37  Phos  3.0     03-30  Mg     1.9     03-30    LIVER FUNCTIONS - ( 30 Mar 2018 06:37 )  Alb: 3.1 g/dL / Pro: 5.7 g/dL / ALK PHOS: 505 U/L / ALT: 219 U/L / AST: 205 U/L / GGT: 387 U/L       PT/INR - ( 30 Mar 2018 06:37 )   PT: 30.5 sec;   INR: 2.69     PTT - ( 30 Mar 2018 06:37 )  PTT:41.7 sec    Hemoglobin A1C, Whole Blood: 8.6 % (03-10 @ 23:31)    Radiology:  no new cerebral imaging.    Assessment: 63 year-old male with PMH CHF, s/p AICD vs PPM, ?Afib, HTN, DM2 on insulin, CKD, and gout presented with septic shock 2/2 cellulitis c/b cardiogenic shock, became unresponsive after A-line placement.  Small area of acute infarct in left occipital lobe and possible more involvement of brainstem but not visible well on MRI. No major change in his neurological exam.    Plan:  Monitor neuro status to assess if he improves over time  Unable to predict long term prognosis at this time since unclear how much of brain involved in the strokes.   Plan as per MICU

## 2018-03-30 NOTE — PROGRESS NOTE ADULT - ASSESSMENT
62 yo M history of HFrEF 10-15% 2/2 ischemic cardiomyopathy, MI , s/p AICD vs PPM, ?Afib, Hypertension, Diabetes Mellitus Type 2 on insulin, CKD?and gout, who presents with a chief complaint of generalized weakness. Now shakira due to ATN, not improving, BP more stable off pressors. Today we will try HD - and uf as he his able to tolerate at least one liter

## 2018-03-30 NOTE — PROGRESS NOTE ADULT - SUBJECTIVE AND OBJECTIVE BOX
INTERVAL HPI/OVERNIGHT EVENTS: Off CVVHd. Residuals above 300 overnight, feeds decreased to 40cc/hour     SUBJECTIVE: Patient seen and examined at bedside. Not opening eyes but able to move hands.     OBJECTIVE:    VITAL SIGNS:  ICU Vital Signs Last 24 Hrs  T(C): 36.8 (30 Mar 2018 06:13), Max: 37.3 (30 Mar 2018 01:58)  T(F): 98.3 (30 Mar 2018 06:13), Max: 99.1 (30 Mar 2018 01:58)  HR: 104 (30 Mar 2018 08:00) (82 - 116)  BP: --  BP(mean): --  ABP: 98/62 (30 Mar 2018 08:00) (92/58 - 108/70)  ABP(mean): 74 (30 Mar 2018 08:00) (70 - 82)  RR: 18 (30 Mar 2018 08:00) (13 - 90)  SpO2: 68% (30 Mar 2018 08:00) (68% - 100%)    Mode: CPAP with PS, FiO2: 40, PEEP: 5, MAP: 7.5, PIP: 13    03-29 @ 07:01  -  03-30 @ 07:00  --------------------------------------------------------  IN: 1650 mL / OUT: 84 mL / NET: 1566 mL    03-30 @ 07:01  -  03-30 @ 08:39  --------------------------------------------------------  IN: 90 mL / OUT: 0 mL / NET: 90 mL      CAPILLARY BLOOD GLUCOSE      POCT Blood Glucose.: 147 mg/dL (30 Mar 2018 07:11)      PHYSICAL EXAM:    General: Intubated.  HEENT: NC/AT; horizontal nystagmus. no tracking. PEERL  Neck: supple, R HD cath, L IJ  Respiratory: Decreased air at the bases.   Cardiovascular: +S1/S2; RRR, ii/vi systolic murmur   Abdomen: soft, distended; +BS x4  : Decreased testicular edema   Extremities:  1+ LE edema. b/l venous stasis changes. cool to touch   Vascular: WWP, 2+ peripheral pulses b/l;  Skin: normal color and turgor; no rash  Neuro: Does not open eyes. Able to demonstrate hand  b/l    MEDICATIONS:  MEDICATIONS  (STANDING):  albumin human 25% IVPB 50 milliLiter(s) IV Intermittent once  albumin human 25% IVPB 50 milliLiter(s) IV Intermittent once  albumin human 25% IVPB      aspirin  chewable 81 milliGRAM(s) Oral daily  atorvastatin 80 milliGRAM(s) Oral at bedtime  chlorhexidine 0.12% Liquid 15 milliLiter(s) Swish and Spit two times a day  chlorhexidine 2% Cloths 1 Application(s) Topical daily  dextrose 5%. 1000 milliLiter(s) (50 mL/Hr) IV Continuous <Continuous>  dextrose 50% Injectable 12.5 Gram(s) IV Push once  dextrose 50% Injectable 25 Gram(s) IV Push once  dextrose 50% Injectable 25 Gram(s) IV Push once  ergocalciferol Drops 6000 Unit(s) Oral daily  escitalopram 5 milliGRAM(s) Oral daily  folic acid 1 milliGRAM(s) Oral daily  hydrocortisone sodium succinate Injectable 25 milliGRAM(s) IV Push every 8 hours  insulin glargine Injectable (LANTUS) 26 Unit(s) SubCutaneous at bedtime  insulin lispro (HumaLOG) corrective regimen sliding scale   SubCutaneous every 6 hours  metoprolol tartrate 12.5 milliGRAM(s) Oral every 12 hours  modafinil 100 milliGRAM(s) Oral <User Schedule>  multivitamin 1 Tablet(s) Oral daily  pantoprazole   Suspension 40 milliGRAM(s) Oral daily  sodium chloride 0.9% lock flush 20 milliLiter(s) IV Push once  thiamine 100 milliGRAM(s) Oral daily    MEDICATIONS  (PRN):  acetaminophen    Suspension. 650 milliGRAM(s) Oral every 6 hours PRN Moderate Pain (4 - 6)  dextrose Gel 1 Dose(s) Oral once PRN Blood Glucose LESS THAN 70 milliGRAM(s)/deciliter  glucagon  Injectable 1 milliGRAM(s) IntraMuscular once PRN Glucose LESS THAN 70 milligrams/deciliter  sodium chloride 0.9% lock flush 10 milliLiter(s) IV Push every 1 hour PRN After each medication administration  sodium chloride 0.9% lock flush 10 milliLiter(s) IV Push every 12 hours PRN Lumen of catheter NOT used      ALLERGIES:  Allergies    No Known Allergies    Intolerances        LABS:                        8.7    8.0   )-----------( 58       ( 30 Mar 2018 06:37 )             27.9     03-30    136  |  98  |  38<H>  ----------------------------<  139<H>  4.3   |  26  |  2.41<H>    Ca    8.2<L>      30 Mar 2018 06:37  Phos  3.0     03-30  Mg     1.9     03-30    TPro  5.7<L>  /  Alb  3.1<L>  /  TBili  1.2  /  DBili  x   /  AST  205<H>  /  ALT  219<H>  /  AlkPhos  505<H>  03-30    PT/INR - ( 30 Mar 2018 06:37 )   PT: 30.5 sec;   INR: 2.69          PTT - ( 30 Mar 2018 06:37 )  PTT:41.7 sec      RADIOLOGY & ADDITIONAL TESTS: Reviewed. INTERVAL HPI/OVERNIGHT EVENTS: Off CVVHD. Residuals above 300 overnight, feeds decreased to 40cc/hour     SUBJECTIVE: Patient seen and examined at bedside. Not opening eyes but able to move hands.     OBJECTIVE:    VITAL SIGNS:  ICU Vital Signs Last 24 Hrs  T(C): 36.8 (30 Mar 2018 06:13), Max: 37.3 (30 Mar 2018 01:58)  T(F): 98.3 (30 Mar 2018 06:13), Max: 99.1 (30 Mar 2018 01:58)  HR: 104 (30 Mar 2018 08:00) (82 - 116)  BP: --  BP(mean): --  ABP: 98/62 (30 Mar 2018 08:00) (92/58 - 108/70)  ABP(mean): 74 (30 Mar 2018 08:00) (70 - 82)  RR: 18 (30 Mar 2018 08:00) (13 - 90)  SpO2: 68% (30 Mar 2018 08:00) (68% - 100%)    Mode: CPAP with PS, FiO2: 40, PEEP: 5, MAP: 7.5, PIP: 13    03-29 @ 07:01  -  03-30 @ 07:00  --------------------------------------------------------  IN: 1650 mL / OUT: 84 mL / NET: 1566 mL    03-30 @ 07:01  -  03-30 @ 08:39  --------------------------------------------------------  IN: 90 mL / OUT: 0 mL / NET: 90 mL      CAPILLARY BLOOD GLUCOSE      POCT Blood Glucose.: 147 mg/dL (30 Mar 2018 07:11)      PHYSICAL EXAM:    General: Intubated.  HEENT: NC/AT; horizontal nystagmus. no tracking. PEERL  Neck: supple, R HD cath, L IJ  Respiratory: Decreased air at the bases.   Cardiovascular: +S1/S2; RRR, ii/vi systolic murmur   Abdomen: soft, distended; +BS x4  : Decreased testicular edema   Extremities:  1+ LE edema. b/l venous stasis changes. cool to touch   Vascular: WWP, 2+ peripheral pulses b/l;  Skin: normal color and turgor; no rash  Neuro: Does not open eyes. Able to demonstrate hand  b/l    MEDICATIONS:  MEDICATIONS  (STANDING):  albumin human 25% IVPB 50 milliLiter(s) IV Intermittent once  albumin human 25% IVPB 50 milliLiter(s) IV Intermittent once  albumin human 25% IVPB      aspirin  chewable 81 milliGRAM(s) Oral daily  atorvastatin 80 milliGRAM(s) Oral at bedtime  chlorhexidine 0.12% Liquid 15 milliLiter(s) Swish and Spit two times a day  chlorhexidine 2% Cloths 1 Application(s) Topical daily  dextrose 5%. 1000 milliLiter(s) (50 mL/Hr) IV Continuous <Continuous>  dextrose 50% Injectable 12.5 Gram(s) IV Push once  dextrose 50% Injectable 25 Gram(s) IV Push once  dextrose 50% Injectable 25 Gram(s) IV Push once  ergocalciferol Drops 6000 Unit(s) Oral daily  escitalopram 5 milliGRAM(s) Oral daily  folic acid 1 milliGRAM(s) Oral daily  hydrocortisone sodium succinate Injectable 25 milliGRAM(s) IV Push every 8 hours  insulin glargine Injectable (LANTUS) 26 Unit(s) SubCutaneous at bedtime  insulin lispro (HumaLOG) corrective regimen sliding scale   SubCutaneous every 6 hours  metoprolol tartrate 12.5 milliGRAM(s) Oral every 12 hours  modafinil 100 milliGRAM(s) Oral <User Schedule>  multivitamin 1 Tablet(s) Oral daily  pantoprazole   Suspension 40 milliGRAM(s) Oral daily  sodium chloride 0.9% lock flush 20 milliLiter(s) IV Push once  thiamine 100 milliGRAM(s) Oral daily    MEDICATIONS  (PRN):  acetaminophen    Suspension. 650 milliGRAM(s) Oral every 6 hours PRN Moderate Pain (4 - 6)  dextrose Gel 1 Dose(s) Oral once PRN Blood Glucose LESS THAN 70 milliGRAM(s)/deciliter  glucagon  Injectable 1 milliGRAM(s) IntraMuscular once PRN Glucose LESS THAN 70 milligrams/deciliter  sodium chloride 0.9% lock flush 10 milliLiter(s) IV Push every 1 hour PRN After each medication administration  sodium chloride 0.9% lock flush 10 milliLiter(s) IV Push every 12 hours PRN Lumen of catheter NOT used      ALLERGIES:  Allergies    No Known Allergies    Intolerances        LABS:                        8.7    8.0   )-----------( 58       ( 30 Mar 2018 06:37 )             27.9     03-30    136  |  98  |  38<H>  ----------------------------<  139<H>  4.3   |  26  |  2.41<H>    Ca    8.2<L>      30 Mar 2018 06:37  Phos  3.0     03-30  Mg     1.9     03-30    TPro  5.7<L>  /  Alb  3.1<L>  /  TBili  1.2  /  DBili  x   /  AST  205<H>  /  ALT  219<H>  /  AlkPhos  505<H>  03-30    PT/INR - ( 30 Mar 2018 06:37 )   PT: 30.5 sec;   INR: 2.69          PTT - ( 30 Mar 2018 06:37 )  PTT:41.7 sec      RADIOLOGY & ADDITIONAL TESTS: Reviewed.

## 2018-03-30 NOTE — PROGRESS NOTE ADULT - PROBLEM SELECTOR PLAN 4
Anemia of chronic renal disease with hemoglobin 8.7  Ferritin: 1377 and T sat% 34.  No EPO or iron at present due to probable GILMER vs CKD.

## 2018-03-30 NOTE — PROGRESS NOTE ADULT - ATTENDING COMMENTS
Pt remains intubated, critically ill, requiring mechanical vent support. He appears more awake this morning, following some commands. CVVH stopped, plan for trial of intermittent HD today. D/c left CVC. Hemodynamically stable.  Cont standard vent bundle.  Enteral feeding  GI/DVT prophy

## 2018-03-30 NOTE — PROGRESS NOTE ADULT - SUBJECTIVE AND OBJECTIVE BOX
Patient was seen and evaluated on dialysis.   Patient is tolerating the procedure well.   HR: 90 (03-30-18 @ 17:00)  BP: 96/76  Continue dialysis:   Dialyzer:  180        QB: 400        QD: 500   Goal UF 1 liter over 3 Hours

## 2018-03-30 NOTE — PROGRESS NOTE ADULT - SUBJECTIVE AND OBJECTIVE BOX
the patient seen in the morning - intubated, off pressor, CVVHD stopped yesterday   Renal service follows for the need of fluid management in the setting of CHF, GILMER - oliguria     Patient seen and examined at bedside.     acetaminophen    Suspension. 650 milliGRAM(s) every 6 hours PRN  albumin human 25% IVPB 50 milliLiter(s) once  albumin human 25% IVPB 50 milliLiter(s) once  albumin human 25% IVPB     aspirin  chewable 81 milliGRAM(s) daily  chlorhexidine 0.12% Liquid 15 milliLiter(s) two times a day  chlorhexidine 2% Cloths 1 Application(s) daily  dextrose 5%. 1000 milliLiter(s) <Continuous>  dextrose 50% Injectable 12.5 Gram(s) once  dextrose 50% Injectable 25 Gram(s) once  dextrose 50% Injectable 25 Gram(s) once  dextrose Gel 1 Dose(s) once PRN  ergocalciferol Drops 6000 Unit(s) daily  escitalopram 5 milliGRAM(s) daily  folic acid 1 milliGRAM(s) daily  glucagon  Injectable 1 milliGRAM(s) once PRN  hydrocortisone sodium succinate Injectable 25 milliGRAM(s) every 8 hours  insulin glargine Injectable (LANTUS) 26 Unit(s) at bedtime  insulin lispro (HumaLOG) corrective regimen sliding scale   every 6 hours  metoclopramide Injectable 5 milliGRAM(s) every 8 hours  metoprolol tartrate 12.5 milliGRAM(s) every 12 hours  modafinil 100 milliGRAM(s) <User Schedule>  multivitamin 1 Tablet(s) daily  pantoprazole   Suspension 40 milliGRAM(s) daily  sodium chloride 0.9% lock flush 10 milliLiter(s) every 1 hour PRN  sodium chloride 0.9% lock flush 10 milliLiter(s) every 12 hours PRN  sodium chloride 0.9% lock flush 20 milliLiter(s) once  thiamine 100 milliGRAM(s) daily      Allergies    No Known Allergies    Intolerances        T(C): , Max: 37.4 (03-30-18 @ 10:00)  T(F): , Max: 99.4 (03-30-18 @ 10:00)  HR: 100 (03-30-18 @ 13:00)  BP: --  BP(mean): --  RR: 14 (03-30-18 @ 13:00)  SpO2: 100% (03-30-18 @ 13:00)  Wt(kg): --    03-29 @ 07:01  -  03-30 @ 07:00  --------------------------------------------------------  IN:    Enteral Tube Flush: 170 mL    Glucerna: 1480 mL  Total IN: 1650 mL    OUT:    Other: 84 mL  Total OUT: 84 mL    Total NET: 1566 mL      03-30 @ 07:01  - 03-30 @ 13:05  --------------------------------------------------------  IN:    Enteral Tube Flush: 110 mL    Glucerna: 80 mL    Glucerna: 160 mL  Total IN: 350 mL    OUT:  Total OUT: 0 mL    Total NET: 350 mL      Physical exam:   Intubated and off sedation   No JVD  Normal air entry into the lungs, no wheezing, no crackles   RRR, normal s1, s2, no murmurs, rubs or gallops   Abdomen - soft, not tender, not distended   Extremities: no edema   HAs a HD catheter on the right of his neck        LABS:                        8.7    8.0   )-----------( 58       ( 30 Mar 2018 06:37 )             27.9     03-30    136  |  98  |  38<H>  ----------------------------<  139<H>  4.3   |  26  |  2.41<H>    Ca    8.2<L>      30 Mar 2018 06:37  Phos  3.0     03-30  Mg     1.9     03-30    TPro  5.7<L>  /  Alb  3.1<L>  /  TBili  1.2  /  DBili  x   /  AST  205<H>  /  ALT  219<H>  /  AlkPhos  505<H>  03-30      PT/INR - ( 30 Mar 2018 06:37 )   PT: 30.5 sec;   INR: 2.69          PTT - ( 30 Mar 2018 06:37 )  PTT:41.7 sec          RADIOLOGY & ADDITIONAL STUDIES:

## 2018-03-30 NOTE — PROGRESS NOTE ADULT - PROBLEM SELECTOR PLAN 1
- oliguric GILMER from ATN from septic shock   - On CVVHD - stopped on 3/3o   - anuric   - we will start HD today   - volume status acceptable - on the wet side - we will try more UF   - in and outs   - daily weight   - electrolytes noted

## 2018-03-30 NOTE — PROGRESS NOTE ADULT - ASSESSMENT
63M PMH HFrEF 10-15% (ischemic), MI, s/p AICD vs PPM, possible Afib, HTN, DM2 on insulin, possible CKD, and gout, who presents with a chief complaint of generalized weakness s/p septic shock likely 2/2 LE cellulitis. Now with AMS likely from brainstem infarct.     NEURO  #AMS  - Pt non responsive since last week. VItal signs normal during the event. Pt intubated for airway protection. Stroke code called. CT, CTA negative. VEEG in place- moderate slowing but no seizure activity.   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 Per neuro, event could have resulted from hypoperfusion.   - Started on Modafinil. WIll discuss increasing dose with neuro   - no seizure activity on VEEG  -  TTE with Affinity shows no clot.   - neuro team consulted. f/u recs     #   ID- septic shock 2/2 Cellulitis- resolved   -Resolved, now off pressors   - steroids tapered to  25 q8  -.Discontinued zosyn for suspected UTI,(3/25-3/26)  s/p Zosyn (3/11-2/21). Was previously also on Vanco (3/11-3/17).   - RUQ dopper shows no portal vein thrombosis   - TTE with no vegetations. Pt not stable for RUKHSANA.   - Gallium scan read shows LLE cellulitis. ,    - repeat LE CT does not show abscess or gas.   - R lung Effusion likely from overload. s/p thoracentesis with improvement in resp status   -  HIV negative  - Vascular surgery on board. Recommending Amputation as an option if pt continues to be unstable.     #UTI- Urine cloudy appearing with increase WBC.  - discontinued zosyn 3/25-3/26    CARDIOVASCULAR   # HYPOTENSION-   - Off pressors. Will maintain SBP>65.   -  Switching to HD starting today. OFF CVVHD  - pending transfer to CCU  - Gallium scan with only LLE cellulitis   - Lactate downtrended to WNL   - CHF with severely reduced EF 15%, caution with fluids. Per renal recs, can give IV albumin for fluids.    # CHF exacerbation  - CHF with EF 10-15% per pt 2/2 ischemic cardiomyopathy s/p AICD as indicated by CXR   - Elevated BNP on admission with R sided effusion and edema  - Cardiac shock causing hypotension- now improved will monitor as we increase UF rate on CVVHD  - Persistent pulm congestion noted on chest Xray. c/w CVVHD  - Give fluids judiciously given low EF in setting of likely sepsis  - Unclear medical regimen opt. Per [facility] pharmacy ( and [place]) pt has not picked up Torsemide 20 or Metoprolol 100 since November.   - Hold ACE/Metoprolol in setting of sepsis  - CVO saturation improved after transfusion    #AFIB  - PT with hx of Afib and LV thrombus on coumadin.    -d/c  Hep drip given concern of HIT.d/c Argatroban.  - Dose coumadin daily. OG tube placed.   - Pt s/p FFP (3/13,3/14) and Vit K (3/13) for thoracocentesis and HD catheter placement.   -TTE with affinity shows no  LV thrombus        #CAD- Hx of MI s/p AICD  - Pt with pLAD in 7/2017, was started on Aspirin and Brelinta per records.  - Will start on asp 81, Atorvastatin 80   - c/w Coumadin.     # LE Edema   - LE edema with chronic venous stasis.   - Duplex does not show DVT    PULMONARY   Intubated  - Intubated on 3/22 for airway protection   - Will attempt CPAP trial and extubation once patient more responsive     #Acute resp failure- Resolved   - Intubated for airway protection   - pt with R loculated plural effusion noted to have increased work of breathing. Likely 2/2 fluid overload, Fluid studies consistent with exudative effusion.   - s/p thoracentesis on 3/13 with R chest tube placement. Chest tube removed 3/15.   - s/p  pRBC transfusion on 3/12. 3/16  - Monitor resp status  - Appreciate CHF team recs regarding fluid status. Continue dialysis for fluid removal      #RENAL   #ARF- 2/2 ischemic ATN  -Unknown baseline Cr presenting with Cr 3.93 with metabolic derangements.   - Likely 2/2 Sepsis, low intravascular volume and CHF  - Trend BUN and Cr.  - Renal team on board, pt with R HD catheter placed by IR on 3/21. Switching to HD.   - CT abdomen and pelvis without acute obstruction.   - retroperitoneal ultrasound showing medical renal disease.     #Hyperkalemia   -Resolved   - PT with elevated K to 5.7 on admission,  no EKG changes   - Continue telemetry monitoring  - Trend K   - Can given Kayexalate if persists   - c/w dialysis     #Metabolic acidosis   - 2/2 Lactate, starvation ketoacidosis and uremia   - resolved       #GI   - pt wit Elevated Bilirubin and Alk phos. Abd exam benign  - CT abdomen/pelvis consistent with cholelithiasis and ascites.     #HEME    #Thrombocytopenia  - ELVIRA negative but PF4 was positive. Unlikely HIT.    - Could have be related to zosyn or sepsis   - Decreasing since Zosyn was restarted. Will monitor,     # elevated INR. Unclear etiology. Pt on coumadin?   - Continue with daily coumadin dosing   - Given Vit K and FFP3/12. FFP 3/13   - Monitor coags    #Anemia  - Stable at 8-9. Anemia of chronic disease.  -s/p 1pRBC on 3/12, 3/16   - WIll keep Hb around 9-10     #ENDOCRINE   - S/p insulin drip  - increased Lantus to 26 U since pt now hyperglycemic with stress dose steroids.   - Will adjust as we taper down steroids.    Monitor blood glucose and ISS coverage. pt with episodes of hypoglycemia.   - Monitor blood glucose   - A1C 8.6%      F: No IVF   E: Replete K<4 Mg<2, caution in setting of acute renal failure  N: OG tube placed, feeds at goal     Lines:  ETT (3/22), OG tube (3/21)R HD catheter (3/21). L IJ 3/21, PICC line 3/20    Full code   Dispo: MICU  Ppx: Coumadin, PPI 63M PMH HFrEF 10-15% (ischemic), MI, s/p AICD vs PPM, possible Afib, HTN, DM2 on insulin, possible CKD, and gout, who presents with a chief complaint of generalized weakness s/p septic shock likely 2/2 LE cellulitis. Now with AMS likely from brainstem infarct.     NEURO  #AMS  - Pt non responsive since last week. VItal signs normal during the event. Pt intubated for airway protection. Stroke code called. CT, CTA negative. VEEG in place- moderate slowing but no seizure activity.   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 Per neuro, event could have resulted from hypoperfusion.   - Started on Modafinil. WIll discuss increasing dose with neuro   - no seizure activity on VEEG  -  TTE with Affinity shows no clot.   - neuro team consulted. f/u recs     #   ID- septic shock 2/2 Cellulitis- resolved   -Resolved, now off pressors   - steroids tapered to  25 q8. Will decreased to 25q12h starting tomorrow  -.Discontinued zosyn for suspected UTI,(3/25-3/26)  s/p Zosyn (3/11-2/21). Was previously also on Vanco (3/11-3/17).   - RUQ dopper shows no portal vein thrombosis. given worsening transaminitis and increased residuals, will obtain another RUQ US.   - TTE with no vegetations. Pt not stable for RUKHSANA.   - Gallium scan read shows LLE cellulitis. ,    - repeat LE CT does not show abscess or gas.   - R lung Effusion likely from overload. s/p thoracentesis with improvement in resp status   -  HIV negative  - Vascular surgery on board. Recommending Amputation as an option if pt continues to be unstable.     #UTI- Urine cloudy appearing with increase WBC.  - discontinued zosyn 3/25-3/26    CARDIOVASCULAR   # HYPOTENSION-   - Off pressors. Will maintain SBP>65.   -  Switching to HD starting today. OFF CVVHD  - pending transfer to CCU  - Gallium scan with only LLE cellulitis   - Lactate downtrended to WNL   - CHF with severely reduced EF 15%, caution with fluids. Per renal recs, can give IV albumin for fluids.    # CHF exacerbation  - CHF with EF 10-15% per pt 2/2 ischemic cardiomyopathy s/p AICD as indicated by CXR   - Elevated BNP on admission with R sided effusion and edema  - Cardiac shock causing hypotension- now improved will monitor as we increase UF rate on CVVHD  - Persistent pulm congestion noted on chest Xray. c/w CVVHD  - Give fluids judiciously given low EF in setting of likely sepsis  - Unclear medical regimen opt. Per [facility] pharmacy ( and [place]) pt has not picked up Torsemide 20 or Metoprolol 100 since November.   - Hold ACE/Metoprolol in setting of sepsis  - CVO saturation improved after transfusion    #AFIB  - PT with hx of Afib and LV thrombus on coumadin.    -d/c  Hep drip given concern of HIT.d/c Argatroban.  - Dose coumadin daily. OG tube placed.   - Pt s/p FFP (3/13,3/14) and Vit K (3/13) for thoracocentesis and HD catheter placement.   -TTE with affinity shows no  LV thrombus        #CAD- Hx of MI s/p AICD  - Pt with pLAD in 7/2017, was started on Aspirin and Brelinta per records.  - Will start on asp 81, Atorvastatin 80   - c/w Coumadin.     # LE Edema   - LE edema with chronic venous stasis.   - Duplex does not show DVT    PULMONARY   Intubated  - Intubated on 3/22 for airway protection   - Will attempt CPAP trial and extubation once patient more responsive     #Acute resp failure- Resolved   - Intubated for airway protection   - pt with R loculated plural effusion noted to have increased work of breathing. Likely 2/2 fluid overload, Fluid studies consistent with exudative effusion.   - s/p thoracentesis on 3/13 with R chest tube placement. Chest tube removed 3/15.   - s/p  pRBC transfusion on 3/12. 3/16  - Monitor resp status  - Appreciate CHF team recs regarding fluid status. Continue dialysis for fluid removal      #RENAL   #ARF- 2/2 ischemic ATN  -Unknown baseline Cr presenting with Cr 3.93 with metabolic derangements.   - Likely 2/2 Sepsis, low intravascular volume and CHF  - Trend BUN and Cr.  - Renal team on board, pt with R HD catheter placed by IR on 3/21. Switching to HD.   - CT abdomen and pelvis without acute obstruction.   - retroperitoneal ultrasound showing medical renal disease.     #Hyperkalemia   -Resolved   - PT with elevated K to 5.7 on admission,  no EKG changes   - Continue telemetry monitoring  - Trend K   - Can given Kayexalate if persists   - c/w dialysis     #Metabolic acidosis   - 2/2 Lactate, starvation ketoacidosis and uremia   - resolved       #GI   - pt wit transaminits. Abd distended, increased residuals   - Will obtain RUQ US.  - Reglan for motility.   - prior CT abdomen/pelvis consistent with cholelithiasis and ascites.     #HEME  #Thrombocytopenia  - ELVIRA negative but PF4 was positive. Unlikely HIT.    - Could have been related to zosyn or sepsis   - Now stable. Was decreasing since Zosyn was restarted. Will monitor,     # elevated INR. Unclear etiology. Pt on coumadin?   - Continue with daily coumadin dosing   - Given Vit K and FFP3/12. FFP 3/13   - Monitor coags    #Anemia  - Stable at 8-9. Anemia of chronic disease.  -s/p 1pRBC on 3/12, 3/16   - WIll keep Hb around 9-10     #ENDOCRINE   - S/p insulin drip  - c/w Lantus 26 U   - Will adjust as we taper down steroids.    Monitor blood glucose and ISS coverage. pt with episodes of hypoglycemia.   - Monitor blood glucose   - A1C 8.6%      F: No IVF   E: Replete K<4 Mg<2, caution in setting of acute renal failure  N: OG tube placed, 1.5 Glucerna      Lines:  ETT (3/22), OG tube (3/21)R HD catheter (3/21). L IJ 3/21, PICC line 3/20    Full code   Dispo: MICU  Ppx: Coumadin, PPI

## 2018-03-30 NOTE — PROGRESS NOTE ADULT - ATTENDING COMMENTS
Continue HD with administration of pressors, if needed.  Consider d/c BB if hypotensive with dialysis.  Please call with questions. Continue HD with administration of pressors, if needed.  Consider d/c BB if hypotensive with dialysis.  If need rate control for RVR, consider amiodarone.

## 2018-03-30 NOTE — PROGRESS NOTE ADULT - ATTENDING COMMENTS
CVP 5-10, bp improved to sys 100's offpressors for 2 days. Will attempt HD today with 0.5-1L UF, using levophed if needed for hemodynamic support given EF 10%, goal net even

## 2018-03-30 NOTE — PROGRESS NOTE ADULT - ASSESSMENT
63 year old gentleman with medical history significant for iCMP (EF 15-20%) s/p AICD, atrial fibrillation, CAD (DESto pLAD), DMII, HTN, CKD (unknown baseline) who presented with heart failure in the setting of septic shock 2/2 LLE cellulitis c/b fluid overload s/p CVVH now euvolemic on exam.      -transitioned to HD 63 year old gentleman with medical history significant for iCMP (EF 15-20%) s/p AICD, atrial fibrillation, CAD (DESto pLAD), DMII, HTN, CKD (unknown baseline) who presented with heart failure in the setting of septic shock 2/2 LLE cellulitis c/b fluid overload s/p CVVH and transitioned to HD. Patient currently euvolemic.   -agree with trial of HD for fluid removal  -consider d/c betablocker if patient become hypotensive during HD and can consider amiodarone if rates uncontrolled  -please call with questions  -discussed with Dr. Bergeron

## 2018-03-31 LAB
ALBUMIN SERPL ELPH-MCNC: 3.2 G/DL — LOW (ref 3.3–5)
ALP SERPL-CCNC: 424 U/L — HIGH (ref 40–120)
ALT FLD-CCNC: 191 U/L — HIGH (ref 10–45)
ANION GAP SERPL CALC-SCNC: 13 MMOL/L — SIGNIFICANT CHANGE UP (ref 5–17)
AST SERPL-CCNC: 159 U/L — HIGH (ref 10–40)
BASE EXCESS BLDA CALC-SCNC: 1.9 MMOL/L — SIGNIFICANT CHANGE UP (ref -2–3)
BILIRUB SERPL-MCNC: 1 MG/DL — SIGNIFICANT CHANGE UP (ref 0.2–1.2)
BUN SERPL-MCNC: 35 MG/DL — HIGH (ref 7–23)
CALCIUM SERPL-MCNC: 8.2 MG/DL — LOW (ref 8.4–10.5)
CHLORIDE SERPL-SCNC: 98 MMOL/L — SIGNIFICANT CHANGE UP (ref 96–108)
CO2 SERPL-SCNC: 25 MMOL/L — SIGNIFICANT CHANGE UP (ref 22–31)
CREAT SERPL-MCNC: 2.27 MG/DL — HIGH (ref 0.5–1.3)
GAS PNL BLDA: SIGNIFICANT CHANGE UP
GLUCOSE BLDC GLUCOMTR-MCNC: 113 MG/DL — HIGH (ref 70–99)
GLUCOSE BLDC GLUCOMTR-MCNC: 126 MG/DL — HIGH (ref 70–99)
GLUCOSE BLDC GLUCOMTR-MCNC: 142 MG/DL — HIGH (ref 70–99)
GLUCOSE BLDC GLUCOMTR-MCNC: 66 MG/DL — LOW (ref 70–99)
GLUCOSE BLDC GLUCOMTR-MCNC: 71 MG/DL — SIGNIFICANT CHANGE UP (ref 70–99)
GLUCOSE BLDC GLUCOMTR-MCNC: 73 MG/DL — SIGNIFICANT CHANGE UP (ref 70–99)
GLUCOSE SERPL-MCNC: 160 MG/DL — HIGH (ref 70–99)
HCO3 BLDA-SCNC: 26 MMOL/L — SIGNIFICANT CHANGE UP (ref 21–28)
HCT VFR BLD CALC: 26.1 % — LOW (ref 39–50)
HGB BLD-MCNC: 8 G/DL — LOW (ref 13–17)
INR BLD: 2.25 — HIGH (ref 0.88–1.16)
MAGNESIUM SERPL-MCNC: 1.9 MG/DL — SIGNIFICANT CHANGE UP (ref 1.6–2.6)
MCHC RBC-ENTMCNC: 30.7 G/DL — LOW (ref 32–36)
MCHC RBC-ENTMCNC: 31.1 PG — SIGNIFICANT CHANGE UP (ref 27–34)
MCV RBC AUTO: 101.6 FL — HIGH (ref 80–100)
PCO2 BLDA: 36 MMHG — SIGNIFICANT CHANGE UP (ref 35–48)
PH BLDA: 7.47 — HIGH (ref 7.35–7.45)
PHOSPHATE SERPL-MCNC: 2.6 MG/DL — SIGNIFICANT CHANGE UP (ref 2.5–4.5)
PLATELET # BLD AUTO: 48 K/UL — LOW (ref 150–400)
PO2 BLDA: 188 MMHG — HIGH (ref 83–108)
POTASSIUM SERPL-MCNC: 4.3 MMOL/L — SIGNIFICANT CHANGE UP (ref 3.5–5.3)
POTASSIUM SERPL-SCNC: 4.3 MMOL/L — SIGNIFICANT CHANGE UP (ref 3.5–5.3)
PROT SERPL-MCNC: 5.8 G/DL — LOW (ref 6–8.3)
PROTHROM AB SERPL-ACNC: 25.4 SEC — HIGH (ref 9.8–12.7)
RBC # BLD: 2.57 M/UL — LOW (ref 4.2–5.8)
RBC # FLD: 20.5 % — HIGH (ref 10.3–16.9)
SAO2 % BLDA: 100 % — SIGNIFICANT CHANGE UP (ref 95–100)
SODIUM SERPL-SCNC: 136 MMOL/L — SIGNIFICANT CHANGE UP (ref 135–145)
WBC # BLD: 6.8 K/UL — SIGNIFICANT CHANGE UP (ref 3.8–10.5)
WBC # FLD AUTO: 6.8 K/UL — SIGNIFICANT CHANGE UP (ref 3.8–10.5)

## 2018-03-31 PROCEDURE — 71045 X-RAY EXAM CHEST 1 VIEW: CPT | Mod: 26

## 2018-03-31 PROCEDURE — 99291 CRITICAL CARE FIRST HOUR: CPT

## 2018-03-31 PROCEDURE — 99231 SBSQ HOSP IP/OBS SF/LOW 25: CPT

## 2018-03-31 RX ORDER — ALBUMIN HUMAN 25 %
50 VIAL (ML) INTRAVENOUS
Qty: 0 | Refills: 0 | Status: COMPLETED | OUTPATIENT
Start: 2018-03-31 | End: 2018-03-31

## 2018-03-31 RX ORDER — DEXTROSE 50 % IN WATER 50 %
12.5 SYRINGE (ML) INTRAVENOUS ONCE
Qty: 0 | Refills: 0 | Status: DISCONTINUED | OUTPATIENT
Start: 2018-03-31 | End: 2018-03-31

## 2018-03-31 RX ORDER — MIDODRINE HYDROCHLORIDE 2.5 MG/1
5 TABLET ORAL EVERY 8 HOURS
Qty: 0 | Refills: 0 | Status: DISCONTINUED | OUTPATIENT
Start: 2018-03-31 | End: 2018-04-01

## 2018-03-31 RX ORDER — WARFARIN SODIUM 2.5 MG/1
2.5 TABLET ORAL ONCE
Qty: 0 | Refills: 0 | Status: COMPLETED | OUTPATIENT
Start: 2018-03-31 | End: 2018-03-31

## 2018-03-31 RX ORDER — DEXAMETHASONE 0.5 MG/5ML
1 ELIXIR ORAL EVERY 12 HOURS
Qty: 0 | Refills: 0 | Status: DISCONTINUED | OUTPATIENT
Start: 2018-03-31 | End: 2018-04-01

## 2018-03-31 RX ORDER — DEXTROSE 50 % IN WATER 50 %
25 SYRINGE (ML) INTRAVENOUS ONCE
Qty: 0 | Refills: 0 | Status: COMPLETED | OUTPATIENT
Start: 2018-03-31 | End: 2018-03-31

## 2018-03-31 RX ADMIN — WARFARIN SODIUM 2.5 MILLIGRAM(S): 2.5 TABLET ORAL at 21:57

## 2018-03-31 RX ADMIN — Medication 1 MILLIGRAM(S): at 17:31

## 2018-03-31 RX ADMIN — ESCITALOPRAM OXALATE 5 MILLIGRAM(S): 10 TABLET, FILM COATED ORAL at 11:47

## 2018-03-31 RX ADMIN — Medication 5 MILLIGRAM(S): at 12:21

## 2018-03-31 RX ADMIN — CHLORHEXIDINE GLUCONATE 1 APPLICATION(S): 213 SOLUTION TOPICAL at 12:21

## 2018-03-31 RX ADMIN — Medication 5 MILLIGRAM(S): at 06:45

## 2018-03-31 RX ADMIN — Medication 25 MILLILITER(S): at 23:13

## 2018-03-31 RX ADMIN — CHLORHEXIDINE GLUCONATE 15 MILLILITER(S): 213 SOLUTION TOPICAL at 11:48

## 2018-03-31 RX ADMIN — MODAFINIL 100 MILLIGRAM(S): 200 TABLET ORAL at 12:21

## 2018-03-31 RX ADMIN — MIDODRINE HYDROCHLORIDE 5 MILLIGRAM(S): 2.5 TABLET ORAL at 17:31

## 2018-03-31 RX ADMIN — MIDODRINE HYDROCHLORIDE 5 MILLIGRAM(S): 2.5 TABLET ORAL at 11:47

## 2018-03-31 RX ADMIN — Medication 1 TABLET(S): at 11:47

## 2018-03-31 RX ADMIN — Medication 2 MILLIGRAM(S): at 23:09

## 2018-03-31 RX ADMIN — Medication 50 MILLILITER(S): at 12:00

## 2018-03-31 RX ADMIN — Medication 100 MILLIGRAM(S): at 11:47

## 2018-03-31 RX ADMIN — INSULIN GLARGINE 26 UNIT(S): 100 INJECTION, SOLUTION SUBCUTANEOUS at 23:13

## 2018-03-31 RX ADMIN — Medication 1 MILLIGRAM(S): at 11:47

## 2018-03-31 RX ADMIN — CHLORHEXIDINE GLUCONATE 15 MILLILITER(S): 213 SOLUTION TOPICAL at 21:55

## 2018-03-31 RX ADMIN — MIDODRINE HYDROCHLORIDE 5 MILLIGRAM(S): 2.5 TABLET ORAL at 21:55

## 2018-03-31 RX ADMIN — Medication 5 MILLIGRAM(S): at 21:56

## 2018-03-31 RX ADMIN — Medication 12.5 MILLIGRAM(S): at 17:31

## 2018-03-31 RX ADMIN — Medication 50 MILLILITER(S): at 15:00

## 2018-03-31 RX ADMIN — MODAFINIL 100 MILLIGRAM(S): 200 TABLET ORAL at 06:45

## 2018-03-31 RX ADMIN — Medication 81 MILLIGRAM(S): at 11:47

## 2018-03-31 RX ADMIN — Medication 1.5 MICROGRAM(S)/KG/MIN: at 00:31

## 2018-03-31 RX ADMIN — ERGOCALCIFEROL 6000 UNIT(S): 1.25 CAPSULE ORAL at 12:21

## 2018-03-31 RX ADMIN — Medication 25 MILLIGRAM(S): at 06:46

## 2018-03-31 RX ADMIN — Medication 50 MILLILITER(S): at 14:06

## 2018-03-31 RX ADMIN — Medication 50 MILLILITER(S): at 13:05

## 2018-03-31 RX ADMIN — PANTOPRAZOLE SODIUM 40 MILLIGRAM(S): 20 TABLET, DELAYED RELEASE ORAL at 11:48

## 2018-03-31 RX ADMIN — Medication 12.5 MILLIGRAM(S): at 06:45

## 2018-03-31 NOTE — PROGRESS NOTE ADULT - SUBJECTIVE AND OBJECTIVE BOX
*** THIS NOTE IS IN PROGRESS    INTERVAL HPI/OVERNIGHT EVENTS:    OVERNIGHT: No overnight events.  SUBJECTIVE: Patient seen and examined at bedside.     ROS:  CV: Denies chest pain  Resp: Denies SOB  GI: Denies abdominal pain, constipation, diarrhea, nausea, vomiting  : Denies dysuria  ID: Denies fevers, chills  MSK: Denies joint pain     OBJECTIVE:    VITAL SIGNS:  ICU Vital Signs Last 24 Hrs  T(C): 36.4 (31 Mar 2018 01:00), Max: 37.4 (30 Mar 2018 10:00)  T(F): 97.5 (31 Mar 2018 01:00), Max: 99.4 (30 Mar 2018 10:00)  HR: 96 (31 Mar 2018 05:00) (84 - 109)  BP: --  BP(mean): --  ABP: 92/58 (31 Mar 2018 05:00) (78/44 - 114/76)  ABP(mean): 70 (31 Mar 2018 05:00) (54 - 90)  RR: 13 (31 Mar 2018 05:00) (11 - 24)  SpO2: 100% (31 Mar 2018 05:00) (68% - 100%)    Mode: AC/ CMV (Assist Control/ Continuous Mandatory Ventilation), RR (machine): 14, TV (machine): 500, FiO2: 40, PEEP: 5, ITime: 1, MAP: 7.7, PIP: 17    03-29 @ 07:01  -  03-30 @ 07:00  --------------------------------------------------------  IN: 1650 mL / OUT: 84 mL / NET: 1566 mL    03-30 @ 07:01  -  03-31 @ 06:01  --------------------------------------------------------  IN: 1076 mL / OUT: 1000 mL / NET: 76 mL      CAPILLARY BLOOD GLUCOSE      POCT Blood Glucose.: 203 mg/dL (30 Mar 2018 22:18)      PHYSICAL EXAM:    General: NAD, comfortable  HEENT: NCAT, PERRL, clear conjunctiva, no scleral icterus  Neck: supple, no JVD  Respiratory: CTA b/l, no wheezing, rhonchi, rales  Cardiovascular: RRR, normal S1S2, no M/R/G  Abdomen: soft, NT/ND, bowel sounds in all four quadrants, no palpable masses  Extremities: WWP, no clubbing, cyanosis, or edema  Neuro:     MEDICATIONS:  MEDICATIONS  (STANDING):  aspirin  chewable 81 milliGRAM(s) Oral daily  chlorhexidine 0.12% Liquid 15 milliLiter(s) Swish and Spit two times a day  chlorhexidine 2% Cloths 1 Application(s) Topical daily  dextrose 5%. 1000 milliLiter(s) (50 mL/Hr) IV Continuous <Continuous>  dextrose 50% Injectable 12.5 Gram(s) IV Push once  dextrose 50% Injectable 25 Gram(s) IV Push once  dextrose 50% Injectable 25 Gram(s) IV Push once  ergocalciferol Drops 6000 Unit(s) Oral daily  escitalopram 5 milliGRAM(s) Oral daily  folic acid 1 milliGRAM(s) Oral daily  hydrocortisone sodium succinate Injectable 25 milliGRAM(s) IV Push every 12 hours  insulin glargine Injectable (LANTUS) 26 Unit(s) SubCutaneous at bedtime  insulin lispro (HumaLOG) corrective regimen sliding scale   SubCutaneous every 6 hours  metoclopramide Injectable 5 milliGRAM(s) IV Push every 8 hours  metoprolol tartrate 12.5 milliGRAM(s) Oral every 12 hours  modafinil 100 milliGRAM(s) Oral <User Schedule>  multivitamin 1 Tablet(s) Oral daily  norepinephrine Infusion 0.01 MICROgram(s)/kG/Min (1.5 mL/Hr) IV Continuous <Continuous>  pantoprazole   Suspension 40 milliGRAM(s) Oral daily  sodium chloride 0.9% lock flush 20 milliLiter(s) IV Push once  thiamine 100 milliGRAM(s) Oral daily    MEDICATIONS  (PRN):  acetaminophen    Suspension. 650 milliGRAM(s) Oral every 6 hours PRN Moderate Pain (4 - 6)  dextrose Gel 1 Dose(s) Oral once PRN Blood Glucose LESS THAN 70 milliGRAM(s)/deciliter  glucagon  Injectable 1 milliGRAM(s) IntraMuscular once PRN Glucose LESS THAN 70 milligrams/deciliter  sodium chloride 0.9% lock flush 10 milliLiter(s) IV Push every 1 hour PRN After each medication administration  sodium chloride 0.9% lock flush 10 milliLiter(s) IV Push every 12 hours PRN Lumen of catheter NOT used      ALLERGIES:  Allergies    No Known Allergies    Intolerances        LABS:                        8.0    6.8   )-----------( 48       ( 31 Mar 2018 04:26 )             26.1     03-31    136  |  98  |  35<H>  ----------------------------<  160<H>  4.3   |  25  |  2.27<H>    Ca    8.2<L>      31 Mar 2018 04:26  Phos  2.6     03-31  Mg     1.9     03-31    TPro  5.8<L>  /  Alb  3.2<L>  /  TBili  1.0  /  DBili  x   /  AST  159<H>  /  ALT  191<H>  /  AlkPhos  424<H>  03-31    PT/INR - ( 31 Mar 2018 04:26 )   PT: 25.4 sec;   INR: 2.25          PTT - ( 30 Mar 2018 06:37 )  PTT:41.7 sec      RADIOLOGY & ADDITIONAL TESTS: Reviewed. INTERVAL HPI/OVERNIGHT EVENTS:    OVERNIGHT: Dialyzed, 1 L taken off. Required levophed again overnight.   SUBJECTIVE: Patient seen and examined at bedside. More awake and alert, shaking head "no" to answers and opened eyes to command.     OBJECTIVE:    VITAL SIGNS:  ICU Vital Signs Last 24 Hrs  T(C): 36.4 (31 Mar 2018 01:00), Max: 37.4 (30 Mar 2018 10:00)  T(F): 97.5 (31 Mar 2018 01:00), Max: 99.4 (30 Mar 2018 10:00)  HR: 96 (31 Mar 2018 05:00) (84 - 109)  BP: --  BP(mean): --  ABP: 92/58 (31 Mar 2018 05:00) (78/44 - 114/76)  ABP(mean): 70 (31 Mar 2018 05:00) (54 - 90)  RR: 13 (31 Mar 2018 05:00) (11 - 24)  SpO2: 100% (31 Mar 2018 05:00) (68% - 100%)    Mode: AC/ CMV (Assist Control/ Continuous Mandatory Ventilation), RR (machine): 14, TV (machine): 500, FiO2: 40, PEEP: 5, ITime: 1, MAP: 7.7, PIP: 17    03-29 @ 07:01  -  03-30 @ 07:00  --------------------------------------------------------  IN: 1650 mL / OUT: 84 mL / NET: 1566 mL    03-30 @ 07:01  -  03-31 @ 06:01  --------------------------------------------------------  IN: 1076 mL / OUT: 1000 mL / NET: 76 mL      CAPILLARY BLOOD GLUCOSE      POCT Blood Glucose.: 203 mg/dL (30 Mar 2018 22:18)      PHYSICAL EXAM:    General: NAD, comfortable. Intubated, somnolent.   HEENT: NCAT, Pupil dilated but equal and reactive bilaterally. clear conjunctiva, no scleral icterus  Neck: supple, JVD approximately 5 cm above from clavicle.   Respiratory: Intubated, cheyne bobo breathing pattern when CPAP attempted. CTA b/l, no wheezing, rhonchi, rales  Cardiovascular: irregularly irregular, normal S1S2, 3/6 systolic murmur   Abdomen: soft, NT, distended, bowel sounds in all four quadrants, no palpable masses  Extremities: LE with scarring and 2 + pitting edema,   Neuro:  Opens eyes to command,  with R hand to command, did not  with left. Kept eyes closed when attempting neuro exam. Pupils dilated, equal round and reactive to light.                           MEDICATIONS:  MEDICATIONS  (STANDING):  aspirin  chewable 81 milliGRAM(s) Oral daily  chlorhexidine 0.12% Liquid 15 milliLiter(s) Swish and Spit two times a day  chlorhexidine 2% Cloths 1 Application(s) Topical daily  dextrose 5%. 1000 milliLiter(s) (50 mL/Hr) IV Continuous <Continuous>  dextrose 50% Injectable 12.5 Gram(s) IV Push once  dextrose 50% Injectable 25 Gram(s) IV Push once  dextrose 50% Injectable 25 Gram(s) IV Push once  ergocalciferol Drops 6000 Unit(s) Oral daily  escitalopram 5 milliGRAM(s) Oral daily  folic acid 1 milliGRAM(s) Oral daily  hydrocortisone sodium succinate Injectable 25 milliGRAM(s) IV Push every 12 hours  insulin glargine Injectable (LANTUS) 26 Unit(s) SubCutaneous at bedtime  insulin lispro (HumaLOG) corrective regimen sliding scale   SubCutaneous every 6 hours  metoclopramide Injectable 5 milliGRAM(s) IV Push every 8 hours  metoprolol tartrate 12.5 milliGRAM(s) Oral every 12 hours  modafinil 100 milliGRAM(s) Oral <User Schedule>  multivitamin 1 Tablet(s) Oral daily  norepinephrine Infusion 0.01 MICROgram(s)/kG/Min (1.5 mL/Hr) IV Continuous <Continuous>  pantoprazole   Suspension 40 milliGRAM(s) Oral daily  sodium chloride 0.9% lock flush 20 milliLiter(s) IV Push once  thiamine 100 milliGRAM(s) Oral daily    MEDICATIONS  (PRN):  acetaminophen    Suspension. 650 milliGRAM(s) Oral every 6 hours PRN Moderate Pain (4 - 6)  dextrose Gel 1 Dose(s) Oral once PRN Blood Glucose LESS THAN 70 milliGRAM(s)/deciliter  glucagon  Injectable 1 milliGRAM(s) IntraMuscular once PRN Glucose LESS THAN 70 milligrams/deciliter  sodium chloride 0.9% lock flush 10 milliLiter(s) IV Push every 1 hour PRN After each medication administration  sodium chloride 0.9% lock flush 10 milliLiter(s) IV Push every 12 hours PRN Lumen of catheter NOT used      ALLERGIES:  Allergies    No Known Allergies    Intolerances        LABS:                        8.0    6.8   )-----------( 48       ( 31 Mar 2018 04:26 )             26.1     03-31    136  |  98  |  35<H>  ----------------------------<  160<H>  4.3   |  25  |  2.27<H>    Ca    8.2<L>      31 Mar 2018 04:26  Phos  2.6     03-31  Mg     1.9     03-31    TPro  5.8<L>  /  Alb  3.2<L>  /  TBili  1.0  /  DBili  x   /  AST  159<H>  /  ALT  191<H>  /  AlkPhos  424<H>  03-31    PT/INR - ( 31 Mar 2018 04:26 )   PT: 25.4 sec;   INR: 2.25          PTT - ( 30 Mar 2018 06:37 )  PTT:41.7 sec      RADIOLOGY & ADDITIONAL TESTS: Reviewed.

## 2018-03-31 NOTE — PROGRESS NOTE ADULT - SUBJECTIVE AND OBJECTIVE BOX
=====================   STROKE ATTENDING NOTE  =====================     HUNTER BRIZUELA   MRN-[id number]  Summary:  63y/M  HPI:  64 yo M history of HFrEF 10-15% 2/2 ischemic cardiomyopathy, MI , s/p AICD vs PPM, ?Afib, Hypertension, Diabetes Mellitus Type 2 on insulin, CKD?and gout, who presents with a chief complaint of generalized weakness. Pt giana admitted to [facility] 2 months ago for gout and was sent to rehab. He was discharged home mid Feb with VNS. In the past 2 weeks patient has noted increased leg pain and weakness bilaterally. In the last few days, he has also experienced generalized weakness prompting him to come to ER.   In ER, noted to have t max of 102, SBP 70s, leukocytosis to 32.8, Lactate of 6.6, Acute renal failure with severe metabolic derangements. Given 3.5L fluids and Vanc/Zosyn. MICU consulted. (10 Mar 2018 18:51)    Overnight Events:    PHYSICAL EXAMINATION  T(C): 36.4 (03-31 @ 15:11), Max: 37.8 (03-31 @ 11:03)  HR: 92 (03-31 @ 15:00) (84 - 108)  BP: --  RR: 13 (03-31 @ 15:00) (6 - 28)  SpO2: 100% (03-31 @ 15:00) (100% - 100%)  CVP(mm Hg):  (6 - 31)    LABS:  CAPILLARY BLOOD GLUCOSE      POCT Blood Glucose.: 126 mg/dL (31 Mar 2018 11:46)  POCT Blood Glucose.: 142 mg/dL (31 Mar 2018 06:26)  POCT Blood Glucose.: 203 mg/dL (30 Mar 2018 22:18)  POCT Blood Glucose.: 142 mg/dL (30 Mar 2018 16:52)                          8.0    6.8   )-----------( 48       ( 31 Mar 2018 04:26 )             26.1     03-31    136  |  98  |  35<H>  ----------------------------<  160<H>  4.3   |  25  |  2.27<H>    Ca    8.2<L>      31 Mar 2018 04:26  Phos  2.6     03-31  Mg     1.9     03-31    TPro  5.8<L>  /  Alb  3.2<L>  /  TBili  1.0  /  DBili  x   /  AST  159<H>  /  ALT  191<H>  /  AlkPhos  424<H>  03-31 03-30 @ 07:01 - 03-31 @ 07:00  --------------------------------------------------------  IN: 1265 mL / OUT: 1000 mL / NET: 265 mL    03-31 @ 07:01 - 03-31 @ 16:14  --------------------------------------------------------  IN: 216 mL / OUT: 2000 mL / NET: -1784 mL        MEDICATIONS:  MEDICATIONS  (STANDING):  aspirin  chewable 81 milliGRAM(s) Oral daily  chlorhexidine 0.12% Liquid 15 milliLiter(s) Swish and Spit two times a day  chlorhexidine 2% Cloths 1 Application(s) Topical daily  dexamethasone  Injectable 1 milliGRAM(s) IV Push every 12 hours  dextrose 5%. 1000 milliLiter(s) (50 mL/Hr) IV Continuous <Continuous>  dextrose 50% Injectable 12.5 Gram(s) IV Push once  dextrose 50% Injectable 25 Gram(s) IV Push once  dextrose 50% Injectable 25 Gram(s) IV Push once  ergocalciferol Drops 6000 Unit(s) Oral daily  escitalopram 5 milliGRAM(s) Oral daily  folic acid 1 milliGRAM(s) Oral daily  insulin glargine Injectable (LANTUS) 26 Unit(s) SubCutaneous at bedtime  insulin lispro (HumaLOG) corrective regimen sliding scale   SubCutaneous every 6 hours  metoclopramide Injectable 5 milliGRAM(s) IV Push every 8 hours  metoprolol tartrate 12.5 milliGRAM(s) Oral every 12 hours  midodrine 5 milliGRAM(s) Oral every 8 hours  modafinil 100 milliGRAM(s) Oral <User Schedule>  multivitamin 1 Tablet(s) Oral daily  norepinephrine Infusion 0.01 MICROgram(s)/kG/Min (1.5 mL/Hr) IV Continuous <Continuous>  pantoprazole   Suspension 40 milliGRAM(s) Oral daily  sodium chloride 0.9% lock flush 20 milliLiter(s) IV Push once  thiamine 100 milliGRAM(s) Oral daily  warfarin 2.5 milliGRAM(s) Oral once    MEDICATIONS  (PRN):  acetaminophen    Suspension. 650 milliGRAM(s) Oral every 6 hours PRN Moderate Pain (4 - 6)  dextrose Gel 1 Dose(s) Oral once PRN Blood Glucose LESS THAN 70 milliGRAM(s)/deciliter  glucagon  Injectable 1 milliGRAM(s) IntraMuscular once PRN Glucose LESS THAN 70 milligrams/deciliter  sodium chloride 0.9% lock flush 10 milliLiter(s) IV Push every 1 hour PRN After each medication administration  sodium chloride 0.9% lock flush 10 milliLiter(s) IV Push every 12 hours PRN Lumen of catheter NOT used        Gen: Lying in bed, calm, cooperative, in NAD  Neuro:  Mental status: slight response to pain to shoulder, not following commands   Cranial nerves: Pupils equally round and reactive to light, eyes midline, oculocephalic reflex without nystagmus, unable to assess facial droop due to ETT    Motor:  no spontaneous movement  Sensation: not much response   Coordination:uncooperative  Gait: deferred      Assessment: 63 year-old male with PMH CHF, s/p AICD vs PPM, ?Afib, HTN, DM2 on insulin, CKD, and gout presented with septic shock 2/2 cellulitis c/b cardiogenic shock, became unresponsive after A-line placement.  Small area of acute infarct in left occipital lobe and possible more involvement of brainstem but not visible well on MRI. No major change in his neurological exam.    Plan:  Monitor neuro status to assess if he improves over time  Unable to predict long term prognosis at this time since unclear how much of brain involved in the strokes.   Plan as per MICU

## 2018-03-31 NOTE — PROGRESS NOTE ADULT - ASSESSMENT
63M PMH HFrEF 10-15% (ischemic), MI, s/p AICD vs PPM, possible Afib, HTN, DM2 on insulin, possible CKD, and gout, who presents with a chief complaint of generalized weakness s/p septic shock likely 2/2 LE cellulitis. Now with AMS likely from brainstem infarct.     NEURO  #AMS  - Pt non responsive since last week. VItal signs normal during the event. Pt intubated for airway protection. Stroke code called. CT, CTA negative. VEEG in place- moderate slowing but no seizure activity.   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 Per neuro, event could have resulted from hypoperfusion.   - Started on Modafinil. WIll discuss increasing dose with neuro   - no seizure activity on VEEG  -  TTE with Affinity shows no clot.   - neuro team consulted. f/u recs     #   ID- septic shock 2/2 Cellulitis- resolved   -Resolved, now off pressors   - steroids tapered to  25 q8. Will decreased to 25q12h starting tomorrow  -.Discontinued zosyn for suspected UTI,(3/25-3/26)  s/p Zosyn (3/11-2/21). Was previously also on Vanco (3/11-3/17).   - RUQ dopper shows no portal vein thrombosis. given worsening transaminitis and increased residuals, will obtain another RUQ US.   - TTE with no vegetations. Pt not stable for RUKHSANA.   - Gallium scan read shows LLE cellulitis. ,    - repeat LE CT does not show abscess or gas.   - R lung Effusion likely from overload. s/p thoracentesis with improvement in resp status   -  HIV negative  - Vascular surgery on board. Recommending Amputation as an option if pt continues to be unstable.     #UTI- Urine cloudy appearing with increase WBC.  - discontinued zosyn 3/25-3/26    CARDIOVASCULAR   # HYPOTENSION-   - Off pressors. Will maintain SBP>65.   -  Switching to HD starting today. OFF CVVHD  - pending transfer to CCU  - Gallium scan with only LLE cellulitis   - Lactate downtrended to WNL   - CHF with severely reduced EF 15%, caution with fluids. Per renal recs, can give IV albumin for fluids.    # CHF exacerbation  - CHF with EF 10-15% per pt 2/2 ischemic cardiomyopathy s/p AICD as indicated by CXR   - Elevated BNP on admission with R sided effusion and edema  - Cardiac shock causing hypotension- now improved will monitor as we increase UF rate on CVVHD  - Persistent pulm congestion noted on chest Xray. c/w CVVHD  - Give fluids judiciously given low EF in setting of likely sepsis  - Unclear medical regimen opt. Per [facility] ( and [place]) pt has not picked up Torsemide 20 or Metoprolol 100 since November.   - Hold ACE/Metoprolol in setting of sepsis  - CVO saturation improved after transfusion    #AFIB  - PT with hx of Afib and LV thrombus on coumadin.    -d/c  Hep drip given concern of HIT.d/c Argatroban.  - Dose coumadin daily. OG tube placed.   - Pt s/p FFP (3/13,3/14) and Vit K (3/13) for thoracocentesis and HD catheter placement.   -TTE with affinity shows no  LV thrombus        #CAD- Hx of MI s/p AICD  - Pt with pLAD in 7/2017, was started on Aspirin and Brelinta per records.  - Will start on asp 81, Atorvastatin 80   - c/w Coumadin.     # LE Edema   - LE edema with chronic venous stasis.   - Duplex does not show DVT    PULMONARY   Intubated  - Intubated on 3/22 for airway protection   - Will attempt CPAP trial and extubation once patient more responsive     #Acute resp failure- Resolved   - Intubated for airway protection   - pt with R loculated plural effusion noted to have increased work of breathing. Likely 2/2 fluid overload, Fluid studies consistent with exudative effusion.   - s/p thoracentesis on 3/13 with R chest tube placement. Chest tube removed 3/15.   - s/p  pRBC transfusion on 3/12. 3/16  - Monitor resp status  - Appreciate CHF team recs regarding fluid status. Continue dialysis for fluid removal      #RENAL   #ARF- 2/2 ischemic ATN  -Unknown baseline Cr presenting with Cr 3.93 with metabolic derangements.   - Likely 2/2 Sepsis, low intravascular volume and CHF  - Trend BUN and Cr.  - Renal team on board, pt with R HD catheter placed by IR on 3/21. Switching to HD.   - CT abdomen and pelvis without acute obstruction.   - retroperitoneal ultrasound showing medical renal disease.     #Hyperkalemia   -Resolved   - PT with elevated K to 5.7 on admission,  no EKG changes   - Continue telemetry monitoring  - Trend K   - Can given Kayexalate if persists   - c/w dialysis     #Metabolic acidosis   - 2/2 Lactate, starvation ketoacidosis and uremia   - resolved       #GI   - pt wit transaminits. Abd distended, increased residuals   - Will obtain RUQ US.  - Reglan for motility.   - prior CT abdomen/pelvis consistent with cholelithiasis and ascites.     #HEME  #Thrombocytopenia  - ELVIRA negative but PF4 was positive. Unlikely HIT.    - Could have been related to zosyn or sepsis   - Now stable. Was decreasing since Zosyn was restarted. Will monitor,     # elevated INR. Unclear etiology. Pt on coumadin?   - Continue with daily coumadin dosing   - Given Vit K and FFP3/12. FFP 3/13   - Monitor coags    #Anemia  - Stable at 8-9. Anemia of chronic disease.  -s/p 1pRBC on 3/12, 3/16   - WIll keep Hb around 9-10     #ENDOCRINE   - S/p insulin drip  - c/w Lantus 26 U   - Will adjust as we taper down steroids.    Monitor blood glucose and ISS coverage. pt with episodes of hypoglycemia.   - Monitor blood glucose   - A1C 8.6%      F: No IVF   E: Replete K<4 Mg<2, caution in setting of acute renal failure  N: OG tube placed, 1.5 Glucerna      Lines:  ETT (3/22), OG tube (3/21)R HD catheter (3/21). L IJ 3/21, PICC line 3/20    Full code   Dispo: MICU  Ppx: Coumadin, PPI 63M PMH HFrEF 10-15% (ischemic), MI, s/p AICD vs PPM, possible Afib, HTN, DM2 on insulin, possible CKD, and gout, who presents with a chief complaint of generalized weakness s/p septic shock likely 2/2 LE cellulitis. Now with AMS likely from brainstem infarct.     NEURO  #AMS  - Pt non responsive since last week. VItal signs normal during the event. Pt intubated for airway protection. Stroke code called. CT, CTA negative. VEEG in place- moderate slowing but no seizure activity.   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 Per neuro, event could have resulted from hypoperfusion.   - Started on Modafinil. WIll discuss increasing dose with neuro   - no seizure activity on VEEG  -  TTE with Affinity shows no clot.   - neuro team consulted. f/u recs     #   ID- septic shock 2/2 Cellulitis- resolved   -Resolved, now off pressors   - steroids changed from solucortef to decadron, equivalent dose 1 mg q12h to start at 6 Pm today in case need to do ACTH stim test to evaluate for adrenal insufficiency.  -.Discontinued zosyn for suspected UTI,(3/25-3/26)  s/p Zosyn (3/11-2/21). Was previously also on Vanco (3/11-3/17).   - RUQ dopper shows no portal vein thrombosis. given worsening transaminitis and increased residuals obtained RUQ sono, will f/u results.   - TTE with no vegetations. Pt not stable for RUKHSANA.   - Gallium scan read showed LLE cellulitis.     - repeat LE CT does not show abscess or gas.   - R lung Effusion likely from overload. s/p thoracentesis with improvement in resp status   -  HIV negative  - Vascular surgery on board. Recommending Amputation as an option if pt continues to be unstable.     #UTI- Urine cloudy appearing with increase WBC.  - discontinued zosyn 3/25-3/26    CARDIOVASCULAR   # HYPOTENSION-   -  Now on pressors from overnight. Will start midrodrine to wean off pressors and maintain SBP>65.   -  HD started yesterday, to be dialyzed again today. OFF CVVHD  - pending transfer to CCU  - Gallium scan with only LLE cellulitis   - Lactate downtrended to WNL   - CHF with severely reduced EF 15%, caution with fluids. Per renal recs, can give IV albumin for fluids.    # CHF exacerbation  - CHF with EF 10-15% per pt 2/2 ischemic cardiomyopathy s/p AICD as indicated by CXR   - Elevated BNP on admission with R sided effusion and edema  - Cardiac shock causing hypotension- now improved will monitor as we increase UF rate on CVVHD  - Persistent pulm congestion noted on chest Xray. c/w CVVHD  - Give fluids judiciously given low EF in setting of likely sepsis  - Unclear medical regimen opt. [facility] ( and [place]) pt has not picked up Torsemide 20 or Metoprolol 100 since November.   - Hold ACE/Metoprolol in setting of sepsis  - CVO saturation improved after transfusion    #AFIB  - PT with hx of Afib and LV thrombus on coumadin.    -d/c  Hep drip given concern of HIT.d/c Argatroban.  - Dose coumadin daily. OG tube placed.   - Pt s/p FFP (3/13,3/14) and Vit K (3/13) for thoracocentesis and HD catheter placement.   -TTE with affinity shows no  LV thrombus        #CAD- Hx of MI s/p AICD  - Pt with pLAD in 7/2017, was started on Aspirin and Brelinta per records.  - On asp 81, consider re-instating Atorvastatin 80 (discontinued 2/2 LFT uptrend)  - c/w Coumadin.     # LE Edema   - LE edema with chronic venous stasis.   - Duplex does not show DVT    PULMONARY   Intubated  - Intubated on 3/22 for airway protection   - Will attempt CPAP trial and extubation once patient more responsive     #Acute resp failure- Resolved   - Intubated for airway protection   - pt with R loculated plural effusion noted to have increased work of breathing. Likely 2/2 fluid overload, Fluid studies consistent with exudative effusion.   - s/p thoracentesis on 3/13 with R chest tube placement. Chest tube removed 3/15.   - s/p  pRBC transfusion on 3/12. 3/16  - Monitor resp status  - Appreciate CHF team recs regarding fluid status. Continue dialysis for fluid removal      #RENAL   #ARF- 2/2 ischemic ATN  -Unknown baseline Cr presenting with Cr 3.93 with metabolic derangements.   - Likely 2/2 Sepsis, low intravascular volume and CHF  - Trend BUN and Cr.  - Renal team on board, pt with R HD catheter placed by IR on 3/21. Switching to HD.   - CT abdomen and pelvis without acute obstruction.   - retroperitoneal ultrasound showing medical renal disease.     #Hyperkalemia   -Resolved   - PT with elevated K to 5.7 on admission,  no EKG changes   - Continue telemetry monitoring  - Trend K   - Can given Kayexalate if persists   - c/w dialysis     #Metabolic acidosis   - 2/2 Lactate, starvation ketoacidosis and uremia   - resolved       #GI   - pt wit transaminits. Abd distended, increased residuals   - Will obtain RUQ US, performed pending read   - Reglan for motility.   - prior CT abdomen/pelvis consistent with cholelithiasis and ascites.     #HEME  #Thrombocytopenia  - ELVIRA negative but PF4 was positive. Unlikely HIT.    - Could have been related to zosyn or sepsis   - Now stable. Will monitor,     # elevated INR. Unclear etiology. Pt on coumadin?   - Continue with daily coumadin dosing   - Given Vit K and FFP3/12. FFP 3/13   - Monitor coags    #Anemia  - Stable at 8-9. Anemia of chronic disease.  -s/p 1pRBC on 3/12, 3/16   - WIll keep Hb around 9-10     #ENDOCRINE   - S/p insulin drip  - c/w Lantus 26 U   - Will adjust as we taper down steroids.    Monitor blood glucose and ISS coverage. pt with episodes of hypoglycemia.   - Monitor blood glucose   - A1C 8.6%      F: No IVF   E: Replete K<4 Mg<2, caution in setting of acute renal failure  N: OG tube placed, 1.5 Glucerna      Lines:  ETT (3/22), OG tube (3/21)R HD catheter (3/21). L IJ 3/21, PICC line 3/20,    Full code   Dispo: MICU  Ppx: Coumadin, PPI

## 2018-03-31 NOTE — PROGRESS NOTE ADULT - PROBLEM SELECTOR PLAN 1
Remains oliguric (Cr reduction should have been greater with 3 hours of clearance on HD on 3/30 if he had spontaneous recovery of ATN)     Remains hypervolemic.   Will perform further HD today  1 to 1.5kg of net UF over 4 hours with cool dialysate and albumin support and  sequential HD to promote hemodynamic stability

## 2018-03-31 NOTE — PROGRESS NOTE ADULT - SUBJECTIVE AND OBJECTIVE BOX
Patient was seen and evaluated on dialysis.   Patient is tolerating the procedure well.   HR: 94 (03-31-18 @ 14:00)  BP: 104/78  Continue dialysis:   Dialyzer: Revaclear 300         QB:   200     QD: 500 3K bath   Goal UF 2kg over 4 Hours     Sequential UF / HD to promote hemodynamic stability being performed now.   Tolerating well with only a small increase in Levophed required

## 2018-03-31 NOTE — PROGRESS NOTE ADULT - SUBJECTIVE AND OBJECTIVE BOX
Patient seen and examined at bedside.     Remains intubated on AC. FiO2 40%. PIP 19.   Unable to obtain history  Still on Levophed but dose decreasing and bridged to midodrine.       acetaminophen    Suspension. 650 milliGRAM(s) every 6 hours PRN  aspirin  chewable 81 milliGRAM(s) daily  chlorhexidine 0.12% Liquid 15 milliLiter(s) two times a day  chlorhexidine 2% Cloths 1 Application(s) daily  dextrose 5%. 1000 milliLiter(s) <Continuous>  dextrose 50% Injectable 12.5 Gram(s) once  dextrose 50% Injectable 25 Gram(s) once  dextrose 50% Injectable 25 Gram(s) once  dextrose Gel 1 Dose(s) once PRN  ergocalciferol Drops 6000 Unit(s) daily  escitalopram 5 milliGRAM(s) daily  folic acid 1 milliGRAM(s) daily  glucagon  Injectable 1 milliGRAM(s) once PRN  hydrocortisone sodium succinate Injectable 25 milliGRAM(s) every 12 hours  insulin glargine Injectable (LANTUS) 26 Unit(s) at bedtime  insulin lispro (HumaLOG) corrective regimen sliding scale   every 6 hours  metoclopramide Injectable 5 milliGRAM(s) every 8 hours  metoprolol tartrate 12.5 milliGRAM(s) every 12 hours  midodrine 5 milliGRAM(s) every 8 hours  modafinil 100 milliGRAM(s) <User Schedule>  multivitamin 1 Tablet(s) daily  norepinephrine Infusion 0.01 MICROgram(s)/kG/Min <Continuous>  pantoprazole   Suspension 40 milliGRAM(s) daily  sodium chloride 0.9% lock flush 10 milliLiter(s) every 1 hour PRN  sodium chloride 0.9% lock flush 10 milliLiter(s) every 12 hours PRN  sodium chloride 0.9% lock flush 20 milliLiter(s) once  thiamine 100 milliGRAM(s) daily      Allergies    No Known Allergies    Intolerances        T(C): , Max: 37.7 (03-31-18 @ 07:40)  T(F): , Max: 99.9 (03-31-18 @ 07:40)  HR: 98 (03-31-18 @ 09:00)  BP: --  BP(mean): --  RR: 14 (03-31-18 @ 09:00)  SpO2: 100% (03-31-18 @ 09:00)  Wt(kg): --    03-30 @ 07:01  -  03-31 @ 07:00  --------------------------------------------------------  IN:    Enteral Tube Flush: 250 mL    Glucerna: 160 mL    Glucerna: 820 mL    norepinephrine Infusion: 35 mL  Total IN: 1265 mL    OUT:    Other: 1000 mL  Total OUT: 1000 mL    Total NET: 265 mL      03-31 @ 07:01  -  03-31 @ 09:50  --------------------------------------------------------  IN:    Glucerna: 55 mL    norepinephrine Infusion: 3 mL  Total IN: 58 mL    OUT:  Total OUT: 0 mL    Total NET: 58 mL              LABS:                        8.0    6.8   )-----------( 48       ( 31 Mar 2018 04:26 )             26.1     03-31    136  |  98  |  35<H>  ----------------------------<  160<H>  4.3   |  25  |  2.27<H>    Ca    8.2<L>      31 Mar 2018 04:26  Phos  2.6     03-31  Mg     1.9     03-31    TPro  5.8<L>  /  Alb  3.2<L>  /  TBili  1.0  /  DBili  x   /  AST  159<H>  /  ALT  191<H>  /  AlkPhos  424<H>  03-31      PT/INR - ( 31 Mar 2018 04:26 )   PT: 25.4 sec;   INR: 2.25          PTT - ( 30 Mar 2018 06:37 )  PTT:41.7 sec          RADIOLOGY & ADDITIONAL STUDIES:

## 2018-03-31 NOTE — PROGRESS NOTE ADULT - ASSESSMENT
62 yo M history of HFrEF 10-15% 2/2 ischemic cardiomyopathy, MI , s/p AICD vs PPM, ?Afib, Hypertension, Diabetes Mellitus Type 2 on insulin, CKD?and gout, who presents with a chief complaint of generalized weakness. Now shakira due to ATN, not improving, BP more stable off pressors. Remains in fluid overload.

## 2018-03-31 NOTE — PROGRESS NOTE ADULT - SUBJECTIVE AND OBJECTIVE BOX
Patient is a 63y old  Male who presents with a chief complaint of       INTERVAL HPI/OVERNIGHT EVENTS: nonw    SYMPTOMS appears comfortable, intubated    DRIPS levo    Mode: AC/ CMV (Assist Control/ Continuous Mandatory Ventilation)  RR (machine): 14  TV (machine): 500  FiO2: 40  PEEP: 5  ITime: 1  MAP: 8  PIP: 18      ICU Vital Signs Last 24 Hrs  T(C): 37.8 (31 Mar 2018 11:55), Max: 37.8 (31 Mar 2018 11:03)  T(F): 100.1 (31 Mar 2018 11:55), Max: 100.1 (31 Mar 2018 11:03)  HR: 102 (31 Mar 2018 11:55) (84 - 108)  BP: --  BP(mean): --  ABP: 113/72 (31 Mar 2018 11:55) (76/50 - 114/76)  ABP(mean): 78 (31 Mar 2018 11:00) (54 - 90)  RR: 16 (31 Mar 2018 11:55) (6 - 28)  SpO2: 100% (31 Mar 2018 11:55) (100% - 100%)      I&O's Summary    30 Mar 2018 07:01  -  31 Mar 2018 07:00  --------------------------------------------------------  IN: 1265 mL / OUT: 1000 mL / NET: 265 mL    31 Mar 2018 07:01  -  31 Mar 2018 13:32  --------------------------------------------------------  IN: 58 mL / OUT: 0 mL / NET: 58 mL        EXAM    Chest few ronchi    Heart rr    Abdomen nontender with bs    Extremities without edema    Neuro responds to name keeps eyes closed      LABS:  CAPILLARY BLOOD GLUCOSE    ABG - ( 31 Mar 2018 12:56 )  pH: 7.47  /  pCO2: 36    /  pO2: 188   / HCO3: 26    / Base Excess: 1.9   /  SaO2: 100                                     8.0    6.8   )-----------( 48       ( 31 Mar 2018 04:26 )             26.1     03-31    136  |  98  |  35<H>  ----------------------------<  160<H>  4.3   |  25  |  2.27<H>    Ca    8.2<L>      31 Mar 2018 04:26  Phos  2.6     03-31  Mg     1.9     03-31    TPro  5.8<L>  /  Alb  3.2<L>  /  TBili  1.0  /  DBili  x   /  AST  159<H>  /  ALT  191<H>  /  AlkPhos  424<H>  03-31    PT/INR - ( 31 Mar 2018 04:26 )   PT: 25.4 sec;   INR: 2.25          PTT - ( 30 Mar 2018 06:37 )  PTT:41.7 sec          RADIOLOGY & ADDITIONAL STUDIES:    CRITICAL CARE TIME SPENT: 35

## 2018-03-31 NOTE — PROGRESS NOTE ADULT - PROBLEM SELECTOR PLAN 2
Acute on chronic systolic CHF with LVEF 15 % and severely low blood pressure  Daily weight - please limit the fluid intake   - and follow up closely the fluid intake   Will plan to keep net negative by at elast 1L per day with HD  Will do daily HD if necessary

## 2018-03-31 NOTE — EEG REPORT - NS EEG TEXT BOX
[facility] Department of Neurology  Inpatient Prolonged Routine Video Electroencephalography Report    Patient Name:	HUNTER BRIZUELA    :	1955  MRN:	[id number]    Study Start Date/Time:  3/30/2018, 1830  Study End Date/Time:	3/30/2018, 2006    Referred by: Denton Torrez MD    Brief Clinical History:  62 y/o man, CHF, hx of MI, s/p AICD, possible Afib, HTN, insulin-dependent DM2, possible CKD, and gout, presented with generalized weakness, septic shock (likely 2/2 LE cellulitis), became unresponsive after arterial line placement.    Pertinent medications: None    Diagnosis Code:   R56.9 convulsions/seizure  CPT: 16898 Extended EEG 1-6 Hours    Acquisition Details:  Electroencephalography was acquired using a minimum of 21 channels on an [employer] [facility] system v 8.1.1 with electrode placement according to the standard International 10-20 system following ACNS (American Clinical Neurophysiology Society) guidelines for Long-Term Video EEG monitoring.  Anterior temporal T1 and T2 electrodes were utilized whenever possible. The [employer] automated spike & seizure detections were all reviewed in detail, in addition to the entirety of the raw EEG.    EEG Findings    Background: The maximally awake background consisted of predominantly alpha/theta frequencies with a normal AP gradient. There was an occasional well-organized and well-modulated PDR of 9 Hz. There were frequent generalized polymorphic theta > delta frequencies as well.   Focal abnormalities: none.   Voltage: 15-30 uV  Continuity: continuous.  Variability: yes. 						  Reactivity: yes.							  Breach: no.  State changes: yes.					  Stage II transients: none.   Epileptiform discharges: none.  Abnormal periodic/rhythmic activity: none.  Seizures or paroxysmal clinical events: none.  HV, photic, or other provocations: not performed.  Other EEG findings: none.   ECG: NSR    Summary and Impression:    The EEG occasionally appeared normal, though the majority of this study consisted of mild-to-moderate slow frequencies which may represent excess drowsiness or some component of mild-to-moderate diffuse or multifocal cerebral dysfunction. Clinical correlation is advised. There were no electrographic seizures, epileptiform discharges, or paroxysmal clinical events.        Marck Rogel MD  Epileptologist

## 2018-03-31 NOTE — PROGRESS NOTE ADULT - ASSESSMENT
A - respiratory failure/cvs brainstem/chf systolic heart failure/ATn GILMER/afib/hypotension    Suggest  taper levo to off  no weans severe cheyne bobo  feeds as ordered check uun  dialysis per renal  continue coumdin adjust by INR  start dex , dc hydrocortisone ACTH test in am

## 2018-04-01 LAB
ALBUMIN SERPL ELPH-MCNC: 3.3 G/DL — SIGNIFICANT CHANGE UP (ref 3.3–5)
ALP SERPL-CCNC: 317 U/L — HIGH (ref 40–120)
ALT FLD-CCNC: 129 U/L — HIGH (ref 10–45)
ANION GAP SERPL CALC-SCNC: 13 MMOL/L — SIGNIFICANT CHANGE UP (ref 5–17)
AST SERPL-CCNC: 82 U/L — HIGH (ref 10–40)
BILIRUB SERPL-MCNC: 1 MG/DL — SIGNIFICANT CHANGE UP (ref 0.2–1.2)
BLD GP AB SCN SERPL QL: NEGATIVE — SIGNIFICANT CHANGE UP
BUN SERPL-MCNC: 34 MG/DL — HIGH (ref 7–23)
CALCIUM SERPL-MCNC: 8.4 MG/DL — SIGNIFICANT CHANGE UP (ref 8.4–10.5)
CHLORIDE SERPL-SCNC: 98 MMOL/L — SIGNIFICANT CHANGE UP (ref 96–108)
CO2 SERPL-SCNC: 27 MMOL/L — SIGNIFICANT CHANGE UP (ref 22–31)
CORTIS PRE/P CHAL SERPL-SCNC: SIGNIFICANT CHANGE UP
CORTIS SP2 SERPL-MCNC: 13.6 UG/DL — SIGNIFICANT CHANGE UP (ref 2.9–25)
CORTIS SP2 SERPL-MCNC: 17 UG/DL — SIGNIFICANT CHANGE UP (ref 2.9–25)
CORTIS SP2 SERPL-MCNC: 18 UG/DL — SIGNIFICANT CHANGE UP (ref 2.9–25)
CREAT SERPL-MCNC: 2.42 MG/DL — HIGH (ref 0.5–1.3)
GLUCOSE BLDC GLUCOMTR-MCNC: 125 MG/DL — HIGH (ref 70–99)
GLUCOSE BLDC GLUCOMTR-MCNC: 135 MG/DL — HIGH (ref 70–99)
GLUCOSE BLDC GLUCOMTR-MCNC: 183 MG/DL — HIGH (ref 70–99)
GLUCOSE BLDC GLUCOMTR-MCNC: 186 MG/DL — HIGH (ref 70–99)
GLUCOSE BLDC GLUCOMTR-MCNC: 77 MG/DL — SIGNIFICANT CHANGE UP (ref 70–99)
GLUCOSE BLDC GLUCOMTR-MCNC: 92 MG/DL — SIGNIFICANT CHANGE UP (ref 70–99)
GLUCOSE SERPL-MCNC: 99 MG/DL — SIGNIFICANT CHANGE UP (ref 70–99)
HCT VFR BLD CALC: 24.9 % — LOW (ref 39–50)
HGB BLD-MCNC: 7.5 G/DL — LOW (ref 13–17)
INR BLD: 2.07 — HIGH (ref 0.88–1.16)
MAGNESIUM SERPL-MCNC: 1.9 MG/DL — SIGNIFICANT CHANGE UP (ref 1.6–2.6)
MCHC RBC-ENTMCNC: 30.1 G/DL — LOW (ref 32–36)
MCHC RBC-ENTMCNC: 30.7 PG — SIGNIFICANT CHANGE UP (ref 27–34)
MCV RBC AUTO: 102 FL — HIGH (ref 80–100)
PHOSPHATE SERPL-MCNC: 2.6 MG/DL — SIGNIFICANT CHANGE UP (ref 2.5–4.5)
PLATELET # BLD AUTO: 34 K/UL — LOW (ref 150–400)
POTASSIUM SERPL-MCNC: 4.5 MMOL/L — SIGNIFICANT CHANGE UP (ref 3.5–5.3)
POTASSIUM SERPL-SCNC: 4.5 MMOL/L — SIGNIFICANT CHANGE UP (ref 3.5–5.3)
PROT SERPL-MCNC: 5.7 G/DL — LOW (ref 6–8.3)
PROTHROM AB SERPL-ACNC: 23.3 SEC — HIGH (ref 9.8–12.7)
RBC # BLD: 2.44 M/UL — LOW (ref 4.2–5.8)
RBC # FLD: 19.1 % — HIGH (ref 10.3–16.9)
RH IG SCN BLD-IMP: POSITIVE — SIGNIFICANT CHANGE UP
SODIUM SERPL-SCNC: 138 MMOL/L — SIGNIFICANT CHANGE UP (ref 135–145)
WBC # BLD: 4.5 K/UL — SIGNIFICANT CHANGE UP (ref 3.8–10.5)
WBC # FLD AUTO: 4.5 K/UL — SIGNIFICANT CHANGE UP (ref 3.8–10.5)

## 2018-04-01 PROCEDURE — 99291 CRITICAL CARE FIRST HOUR: CPT

## 2018-04-01 PROCEDURE — 99292 CRITICAL CARE ADDL 30 MIN: CPT

## 2018-04-01 PROCEDURE — 71045 X-RAY EXAM CHEST 1 VIEW: CPT | Mod: 26

## 2018-04-01 RX ORDER — DEXTROSE 50 % IN WATER 50 %
25 SYRINGE (ML) INTRAVENOUS ONCE
Qty: 0 | Refills: 0 | Status: COMPLETED | OUTPATIENT
Start: 2018-04-01 | End: 2018-04-01

## 2018-04-01 RX ORDER — COSYNTROPIN 0.25 MG/ML
0.25 INJECTION, SOLUTION INTRAVENOUS ONCE
Qty: 0 | Refills: 0 | Status: COMPLETED | OUTPATIENT
Start: 2018-04-01 | End: 2018-04-01

## 2018-04-01 RX ORDER — INSULIN GLARGINE 100 [IU]/ML
13 INJECTION, SOLUTION SUBCUTANEOUS AT BEDTIME
Qty: 0 | Refills: 0 | Status: DISCONTINUED | OUTPATIENT
Start: 2018-04-01 | End: 2018-04-01

## 2018-04-01 RX ORDER — MIDODRINE HYDROCHLORIDE 2.5 MG/1
5 TABLET ORAL
Qty: 0 | Refills: 0 | Status: DISCONTINUED | OUTPATIENT
Start: 2018-04-01 | End: 2018-04-02

## 2018-04-01 RX ORDER — INSULIN GLARGINE 100 [IU]/ML
9 INJECTION, SOLUTION SUBCUTANEOUS AT BEDTIME
Qty: 0 | Refills: 0 | Status: DISCONTINUED | OUTPATIENT
Start: 2018-04-01 | End: 2018-04-02

## 2018-04-01 RX ORDER — DEXMEDETOMIDINE HYDROCHLORIDE IN 0.9% SODIUM CHLORIDE 4 UG/ML
0.2 INJECTION INTRAVENOUS
Qty: 200 | Refills: 0 | Status: DISCONTINUED | OUTPATIENT
Start: 2018-04-01 | End: 2018-04-02

## 2018-04-01 RX ORDER — HYDROCORTISONE 20 MG
25 TABLET ORAL EVERY 12 HOURS
Qty: 0 | Refills: 0 | Status: DISCONTINUED | OUTPATIENT
Start: 2018-04-02 | End: 2018-04-08

## 2018-04-01 RX ADMIN — CHLORHEXIDINE GLUCONATE 15 MILLILITER(S): 213 SOLUTION TOPICAL at 21:16

## 2018-04-01 RX ADMIN — ERGOCALCIFEROL 6000 UNIT(S): 1.25 CAPSULE ORAL at 12:27

## 2018-04-01 RX ADMIN — Medication 100 MILLIGRAM(S): at 12:25

## 2018-04-01 RX ADMIN — INSULIN GLARGINE 9 UNIT(S): 100 INJECTION, SOLUTION SUBCUTANEOUS at 21:17

## 2018-04-01 RX ADMIN — Medication 1.5 MICROGRAM(S)/KG/MIN: at 09:38

## 2018-04-01 RX ADMIN — Medication 5 MILLIGRAM(S): at 13:40

## 2018-04-01 RX ADMIN — MIDODRINE HYDROCHLORIDE 5 MILLIGRAM(S): 2.5 TABLET ORAL at 06:26

## 2018-04-01 RX ADMIN — MIDODRINE HYDROCHLORIDE 5 MILLIGRAM(S): 2.5 TABLET ORAL at 23:24

## 2018-04-01 RX ADMIN — MODAFINIL 100 MILLIGRAM(S): 200 TABLET ORAL at 12:24

## 2018-04-01 RX ADMIN — COSYNTROPIN 0.25 MILLIGRAM(S): 0.25 INJECTION, SOLUTION INTRAVENOUS at 12:22

## 2018-04-01 RX ADMIN — Medication 1 MILLIGRAM(S): at 12:25

## 2018-04-01 RX ADMIN — Medication 1 TABLET(S): at 12:25

## 2018-04-01 RX ADMIN — Medication 5 MILLIGRAM(S): at 21:16

## 2018-04-01 RX ADMIN — ESCITALOPRAM OXALATE 5 MILLIGRAM(S): 10 TABLET, FILM COATED ORAL at 12:27

## 2018-04-01 RX ADMIN — Medication 2: at 23:30

## 2018-04-01 RX ADMIN — DEXMEDETOMIDINE HYDROCHLORIDE IN 0.9% SODIUM CHLORIDE 4 MICROGRAM(S)/KG/HR: 4 INJECTION INTRAVENOUS at 22:39

## 2018-04-01 RX ADMIN — Medication 1 MILLIGRAM(S): at 06:26

## 2018-04-01 RX ADMIN — MODAFINIL 100 MILLIGRAM(S): 200 TABLET ORAL at 06:27

## 2018-04-01 RX ADMIN — Medication 1 MILLIGRAM(S): at 18:05

## 2018-04-01 RX ADMIN — PANTOPRAZOLE SODIUM 40 MILLIGRAM(S): 20 TABLET, DELAYED RELEASE ORAL at 12:28

## 2018-04-01 RX ADMIN — Medication 25 MILLILITER(S): at 13:37

## 2018-04-01 RX ADMIN — Medication 12.5 MILLIGRAM(S): at 06:26

## 2018-04-01 RX ADMIN — Medication 5 MILLIGRAM(S): at 06:25

## 2018-04-01 RX ADMIN — Medication 12.5 MILLIGRAM(S): at 18:05

## 2018-04-01 RX ADMIN — MIDODRINE HYDROCHLORIDE 5 MILLIGRAM(S): 2.5 TABLET ORAL at 12:25

## 2018-04-01 RX ADMIN — MIDODRINE HYDROCHLORIDE 5 MILLIGRAM(S): 2.5 TABLET ORAL at 18:05

## 2018-04-01 RX ADMIN — CHLORHEXIDINE GLUCONATE 15 MILLILITER(S): 213 SOLUTION TOPICAL at 11:47

## 2018-04-01 NOTE — PROGRESS NOTE ADULT - SUBJECTIVE AND OBJECTIVE BOX
Patient seen and examined at bedside.     Tolerated HD well yesterday, though required increase in Levophed dosage despite albumin drip  Net UF 2kg  Total I&O net negative 339mL, though may have more with insensible losses.   Remains intubated  Chemistries reviewed     acetaminophen    Suspension. 650 milliGRAM(s) every 6 hours PRN  aspirin  chewable 81 milliGRAM(s) daily  chlorhexidine 0.12% Liquid 15 milliLiter(s) two times a day  chlorhexidine 2% Cloths 1 Application(s) daily  dexamethasone  Injectable 1 milliGRAM(s) every 12 hours  dextrose 5%. 1000 milliLiter(s) <Continuous>  dextrose 50% Injectable 12.5 Gram(s) once  dextrose 50% Injectable 25 Gram(s) once  dextrose 50% Injectable 25 Gram(s) once  dextrose Gel 1 Dose(s) once PRN  ergocalciferol Drops 6000 Unit(s) daily  escitalopram 5 milliGRAM(s) daily  folic acid 1 milliGRAM(s) daily  glucagon  Injectable 1 milliGRAM(s) once PRN  insulin glargine Injectable (LANTUS) 26 Unit(s) at bedtime  insulin lispro (HumaLOG) corrective regimen sliding scale   every 6 hours  LORazepam   Injectable 2 milliGRAM(s) every 4 hours PRN  metoclopramide Injectable 5 milliGRAM(s) every 8 hours  metoprolol tartrate 12.5 milliGRAM(s) every 12 hours  midodrine 5 milliGRAM(s) every 8 hours  modafinil 100 milliGRAM(s) <User Schedule>  multivitamin 1 Tablet(s) daily  norepinephrine Infusion 0.01 MICROgram(s)/kG/Min <Continuous>  pantoprazole   Suspension 40 milliGRAM(s) daily  sodium chloride 0.9% lock flush 10 milliLiter(s) every 1 hour PRN  sodium chloride 0.9% lock flush 10 milliLiter(s) every 12 hours PRN  sodium chloride 0.9% lock flush 20 milliLiter(s) once  thiamine 100 milliGRAM(s) daily      Allergies    No Known Allergies    Intolerances        T(C): , Max: 37.8 (03-31-18 @ 11:03)  T(F): , Max: 100.1 (03-31-18 @ 11:03)  HR: 90 (04-01-18 @ 08:00)  BP: --  BP(mean): --  RR: 16 (04-01-18 @ 08:00)  SpO2: 100% (04-01-18 @ 08:00)  Wt(kg): --    03-31 @ 07:01  -  04-01 @ 07:00  --------------------------------------------------------  IN:    Enteral Tube Flush: 170 mL    Glucerna: 1320 mL    norepinephrine Infusion: 171 mL  Total IN: 1661 mL    OUT:    Other: 2000 mL  Total OUT: 2000 mL    Total NET: -339 mL      04-01 @ 07:01  -  04-01 @ 08:24  --------------------------------------------------------  IN:    Glucerna: 55 mL  Total IN: 55 mL    OUT:  Total OUT: 0 mL    Total NET: 55 mL              LABS:                        7.5    4.5   )-----------( 34       ( 01 Apr 2018 05:43 )             24.9     04-01    138  |  98  |  34<H>  ----------------------------<  99  4.5   |  27  |  2.42<H>    Ca    8.4      01 Apr 2018 05:43  Phos  2.6     04-01  Mg     1.9     04-01    TPro  5.7<L>  /  Alb  3.3  /  TBili  1.0  /  DBili  x   /  AST  82<H>  /  ALT  129<H>  /  AlkPhos  317<H>  04-01      PT/INR - ( 01 Apr 2018 05:43 )   PT: 23.3 sec;   INR: 2.07                    RADIOLOGY & ADDITIONAL STUDIES:

## 2018-04-01 NOTE — PROGRESS NOTE ADULT - ASSESSMENT
62 yo M history of HFrEF 10-15% 2/2 ischemic cardiomyopathy, MI , s/p AICD vs PPM, ?Afib, Hypertension, Diabetes Mellitus Type 2 on insulin, CKD?and gout, who presents with a chief complaint of generalized weakness. Oliguric GILMER on CKD due to ATN requiring HD support.

## 2018-04-01 NOTE — PROGRESS NOTE ADULT - SUBJECTIVE AND OBJECTIVE BOX
Patient is a 63y old  Male who presents with a chief complaint of       INTERVAL HPI/OVERNIGHT EVENTS: none    SYMPTOMS non-interactive    DRIPS levo    Mode: AC/ CMV (Assist Control/ Continuous Mandatory Ventilation)  RR (machine): 10  TV (machine): 500  FiO2: 40  PEEP: 5  ITime: 0.9  MAP: 7  PIP: 17      ICU Vital Signs Last 24 Hrs  T(C): 37.3 (01 Apr 2018 13:09), Max: 37.8 (31 Mar 2018 19:04)  T(F): 99.1 (01 Apr 2018 13:09), Max: 100.1 (31 Mar 2018 19:04)  HR: 92 (01 Apr 2018 13:00) (84 - 98)  BP: 84/68 (01 Apr 2018 09:00) (84/68 - 84/68)  BP(mean): 75 (01 Apr 2018 09:00) (75 - 75)  ABP: 96/62 (01 Apr 2018 13:00) (92/60 - 126/90)  ABP(mean): 72 (01 Apr 2018 13:00) (70 - 102)  RR: 11 (01 Apr 2018 13:00) (10 - 45)  SpO2: 100% (01 Apr 2018 13:00) (93% - 100%)      I&O's Summary    31 Mar 2018 07:01  -  01 Apr 2018 07:00  --------------------------------------------------------  IN: 1661 mL / OUT: 2000 mL / NET: -339 mL    01 Apr 2018 07:01  -  01 Apr 2018 13:44  --------------------------------------------------------  IN: 65 mL / OUT: 0 mL / NET: 65 mL        EXAM    Chest ronchi    Heart irreg    Abdomen soft nontender    Extremities warm    Neuro noninteractive, no change      LABS:  CAPILLARY BLOOD GLUCOSE    ABG - ( 31 Mar 2018 12:56 )  pH: 7.47  /  pCO2: 36    /  pO2: 188   / HCO3: 26    / Base Excess: 1.9   /  SaO2: 100                                     7.5    4.5   )-----------( 34       ( 01 Apr 2018 05:43 )             24.9     04-01    138  |  98  |  34<H>  ----------------------------<  99  4.5   |  27  |  2.42<H>    Ca    8.4      01 Apr 2018 05:43  Phos  2.6     04-01  Mg     1.9     04-01    TPro  5.7<L>  /  Alb  3.3  /  TBili  1.0  /  DBili  x   /  AST  82<H>  /  ALT  129<H>  /  AlkPhos  317<H>  04-01    PT/INR - ( 01 Apr 2018 05:43 )   PT: 23.3 sec;   INR: 2.07                    RADIOLOGY & ADDITIONAL STUDIES: cr no change    CRITICAL CARE TIME SPENT: 35

## 2018-04-01 NOTE — PROGRESS NOTE ADULT - PROBLEM SELECTOR PLAN 1
Remains oliguric and in volume excess. Slight net negativity achieved yesterday  Will plan for consecutive day HD starting Monday 4/2 to obtain as much UF as safely possible for the patient to facilitate his cardiopulmonary mechanics.    Defer HD for today in absence of emergent nidication

## 2018-04-01 NOTE — PROGRESS NOTE ADULT - PROBLEM SELECTOR PLAN 4
Anemia of chronic renal disease with hemoglobin 8.7  Ferritin: 1377 and T sat% 34.  No EPO or iron at present due to ATN and not ESRD

## 2018-04-01 NOTE — PROGRESS NOTE ADULT - SUBJECTIVE AND OBJECTIVE BOX
=====================   STROKE ATTENDING NOTE  =====================     HUNTER BRIZUELA   MRN-[id number]  Summary:  63y/M  HPI:  62 yo M history of HFrEF 10-15% 2/2 ischemic cardiomyopathy, MI , s/p AICD vs PPM, ?Afib, Hypertension, Diabetes Mellitus Type 2 on insulin, CKD?and gout, who presents with a chief complaint of generalized weakness. Pt giana admitted to [facility] 2 months ago for gout and was sent to rehab. He was discharged home mid Feb with VNS. In the past 2 weeks patient has noted increased leg pain and weakness bilaterally. In the last few days, he has also experienced generalized weakness prompting him to come to ER.   In ER, noted to have t max of 102, SBP 70s, leukocytosis to 32.8, Lactate of 6.6, Acute renal failure with severe metabolic derangements. Given 3.5L fluids and Vanc/Zosyn. MICU consulted. (10 Mar 2018 18:51)    Overnight Events:  no acute events. HD yesterday.    PHYSICAL EXAMINATION  T(C): 37.9 (04-01 @ 17:36), Max: 37.9 (04-01 @ 17:36)  HR: 90 (04-01 @ 18:00) (84 - 98)  BP: 84/68 (04-01 @ 09:00) (84/68 - 84/68)  RR: 12 (04-01 @ 18:00) (10 - 45)  SpO2: 100% (04-01 @ 18:00) (93% - 100%)  CVP(mm Hg): --    LABS:  CAPILLARY BLOOD GLUCOSE      POCT Blood Glucose.: 135 mg/dL (01 Apr 2018 17:22)  POCT Blood Glucose.: 125 mg/dL (01 Apr 2018 12:58)  POCT Blood Glucose.: 77 mg/dL (01 Apr 2018 12:21)  POCT Blood Glucose.: 92 mg/dL (01 Apr 2018 06:36)  POCT Blood Glucose.: 113 mg/dL (31 Mar 2018 23:52)  POCT Blood Glucose.: 71 mg/dL (31 Mar 2018 23:02)  POCT Blood Glucose.: 66 mg/dL (31 Mar 2018 22:59)                          7.5    4.5   )-----------( 34       ( 01 Apr 2018 05:43 )             24.9     04-01    138  |  98  |  34<H>  ----------------------------<  99  4.5   |  27  |  2.42<H>    Ca    8.4      01 Apr 2018 05:43  Phos  2.6     04-01  Mg     1.9     04-01    TPro  5.7<L>  /  Alb  3.3  /  TBili  1.0  /  DBili  x   /  AST  82<H>  /  ALT  129<H>  /  AlkPhos  317<H>  04-01 03-31 @ 07:01 - 04-01 @ 07:00  --------------------------------------------------------  IN: 1661 mL / OUT: 2000 mL / NET: -339 mL    04-01 @ 07:01 - 04-01 @ 19:08  --------------------------------------------------------  IN: 732 mL / OUT: 0 mL / NET: 732 mL        MEDICATIONS:  MEDICATIONS  (STANDING):  chlorhexidine 0.12% Liquid 15 milliLiter(s) Swish and Spit two times a day  chlorhexidine 2% Cloths 1 Application(s) Topical daily  dextrose 5%. 1000 milliLiter(s) (50 mL/Hr) IV Continuous <Continuous>  dextrose 50% Injectable 12.5 Gram(s) IV Push once  dextrose 50% Injectable 25 Gram(s) IV Push once  dextrose 50% Injectable 25 Gram(s) IV Push once  ergocalciferol Drops 6000 Unit(s) Oral daily  escitalopram 5 milliGRAM(s) Oral daily  folic acid 1 milliGRAM(s) Oral daily  insulin glargine Injectable (LANTUS) 9 Unit(s) SubCutaneous at bedtime  insulin lispro (HumaLOG) corrective regimen sliding scale   SubCutaneous every 6 hours  metoclopramide Injectable 5 milliGRAM(s) IV Push every 8 hours  metoprolol tartrate 12.5 milliGRAM(s) Oral every 12 hours  midodrine 5 milliGRAM(s) Oral <User Schedule>  modafinil 100 milliGRAM(s) Oral <User Schedule>  multivitamin 1 Tablet(s) Oral daily  norepinephrine Infusion 0.01 MICROgram(s)/kG/Min (1.5 mL/Hr) IV Continuous <Continuous>  pantoprazole   Suspension 40 milliGRAM(s) Oral daily  sodium chloride 0.9% lock flush 20 milliLiter(s) IV Push once  thiamine 100 milliGRAM(s) Oral daily    MEDICATIONS  (PRN):  acetaminophen    Suspension. 650 milliGRAM(s) Oral every 6 hours PRN Moderate Pain (4 - 6)  dextrose Gel 1 Dose(s) Oral once PRN Blood Glucose LESS THAN 70 milliGRAM(s)/deciliter  glucagon  Injectable 1 milliGRAM(s) IntraMuscular once PRN Glucose LESS THAN 70 milligrams/deciliter  LORazepam   Injectable 2 milliGRAM(s) IntraMuscular every 4 hours PRN Anxiety  sodium chloride 0.9% lock flush 10 milliLiter(s) IV Push every 1 hour PRN After each medication administration  sodium chloride 0.9% lock flush 10 milliLiter(s) IV Push every 12 hours PRN Lumen of catheter NOT used    Gen: Lying in bed, calm, shaking head no  Neuro:  Mental status: responsive to name. nods yes to his name. following commands to attempting opening eyes, 2 fingers on R hand, and wiggles toes on R side.  L side minimal movement.   Cranial nerves: Pupils equally round and reactive to light, eyes midline, oculocephalic reflex without nystagmus, unable to assess facial droop due to ETT    Motor:  3/5 in RUE, 1/5 RLE, 1/5 in L side  Sensation: R > L   Coordination:  uncooperative  Gait: deferred    Assessment: 63 year-old male with PMH CHF, s/p AICD vs PPM, ?Afib, HTN, DM2 on insulin, CKD, and gout presented with septic shock 2/2 cellulitis c/b cardiogenic shock, became unresponsive after A-line placement.  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 No major change in his neurological exam.  Likely prolonged period of toxic-metabolic encephalopathy. Starting to awaken and more appropriate.     Plan:  Prognosis guarded, but is improving.   Consider EEG if there is fluctuation in neuro exam.   Plan as per MICU =====================   STROKE ATTENDING NOTE  =====================     HUNTER BRIZUELA   MRN-[id number]  Summary:  63y/M  HPI:  64 yo M history of HFrEF 10-15% 2/2 ischemic cardiomyopathy, MI , s/p AICD vs PPM, ?Afib, Hypertension, Diabetes Mellitus Type 2 on insulin, CKD?and gout, who presents with a chief complaint of generalized weakness. Pt giana admitted to [facility] 2 months ago for gout and was sent to rehab. He was discharged home mid Feb with VNS. In the past 2 weeks patient has noted increased leg pain and weakness bilaterally. In the last few days, he has also experienced generalized weakness prompting him to come to ER.   In ER, noted to have t max of 102, SBP 70s, leukocytosis to 32.8, Lactate of 6.6, Acute renal failure with severe metabolic derangements. Given 3.5L fluids and Vanc/Zosyn. MICU consulted. (10 Mar 2018 18:51)    Overnight Events:  no acute events. HD yesterday.    PHYSICAL EXAMINATION  T(C): 37.9 (04-01 @ 17:36), Max: 37.9 (04-01 @ 17:36)  HR: 90 (04-01 @ 18:00) (84 - 98)  BP: 84/68 (04-01 @ 09:00) (84/68 - 84/68)  RR: 12 (04-01 @ 18:00) (10 - 45)  SpO2: 100% (04-01 @ 18:00) (93% - 100%)  CVP(mm Hg): --    LABS:  CAPILLARY BLOOD GLUCOSE      POCT Blood Glucose.: 135 mg/dL (01 Apr 2018 17:22)  POCT Blood Glucose.: 125 mg/dL (01 Apr 2018 12:58)  POCT Blood Glucose.: 77 mg/dL (01 Apr 2018 12:21)  POCT Blood Glucose.: 92 mg/dL (01 Apr 2018 06:36)  POCT Blood Glucose.: 113 mg/dL (31 Mar 2018 23:52)  POCT Blood Glucose.: 71 mg/dL (31 Mar 2018 23:02)  POCT Blood Glucose.: 66 mg/dL (31 Mar 2018 22:59)                          7.5    4.5   )-----------( 34       ( 01 Apr 2018 05:43 )             24.9     04-01    138  |  98  |  34<H>  ----------------------------<  99  4.5   |  27  |  2.42<H>    Ca    8.4      01 Apr 2018 05:43  Phos  2.6     04-01  Mg     1.9     04-01    TPro  5.7<L>  /  Alb  3.3  /  TBili  1.0  /  DBili  x   /  AST  82<H>  /  ALT  129<H>  /  AlkPhos  317<H>  04-01 03-31 @ 07:01 - 04-01 @ 07:00  --------------------------------------------------------  IN: 1661 mL / OUT: 2000 mL / NET: -339 mL    04-01 @ 07:01 - 04-01 @ 19:08  --------------------------------------------------------  IN: 732 mL / OUT: 0 mL / NET: 732 mL        MEDICATIONS:  MEDICATIONS  (STANDING):  chlorhexidine 0.12% Liquid 15 milliLiter(s) Swish and Spit two times a day  chlorhexidine 2% Cloths 1 Application(s) Topical daily  dextrose 5%. 1000 milliLiter(s) (50 mL/Hr) IV Continuous <Continuous>  dextrose 50% Injectable 12.5 Gram(s) IV Push once  dextrose 50% Injectable 25 Gram(s) IV Push once  dextrose 50% Injectable 25 Gram(s) IV Push once  ergocalciferol Drops 6000 Unit(s) Oral daily  escitalopram 5 milliGRAM(s) Oral daily  folic acid 1 milliGRAM(s) Oral daily  insulin glargine Injectable (LANTUS) 9 Unit(s) SubCutaneous at bedtime  insulin lispro (HumaLOG) corrective regimen sliding scale   SubCutaneous every 6 hours  metoclopramide Injectable 5 milliGRAM(s) IV Push every 8 hours  metoprolol tartrate 12.5 milliGRAM(s) Oral every 12 hours  midodrine 5 milliGRAM(s) Oral <User Schedule>  modafinil 100 milliGRAM(s) Oral <User Schedule>  multivitamin 1 Tablet(s) Oral daily  norepinephrine Infusion 0.01 MICROgram(s)/kG/Min (1.5 mL/Hr) IV Continuous <Continuous>  pantoprazole   Suspension 40 milliGRAM(s) Oral daily  sodium chloride 0.9% lock flush 20 milliLiter(s) IV Push once  thiamine 100 milliGRAM(s) Oral daily    MEDICATIONS  (PRN):  acetaminophen    Suspension. 650 milliGRAM(s) Oral every 6 hours PRN Moderate Pain (4 - 6)  dextrose Gel 1 Dose(s) Oral once PRN Blood Glucose LESS THAN 70 milliGRAM(s)/deciliter  glucagon  Injectable 1 milliGRAM(s) IntraMuscular once PRN Glucose LESS THAN 70 milligrams/deciliter  LORazepam   Injectable 2 milliGRAM(s) IntraMuscular every 4 hours PRN Anxiety  sodium chloride 0.9% lock flush 10 milliLiter(s) IV Push every 1 hour PRN After each medication administration  sodium chloride 0.9% lock flush 10 milliLiter(s) IV Push every 12 hours PRN Lumen of catheter NOT used    Gen: Lying in bed, calm, shaking head no  Neuro:  Mental status: responsive to name. nods yes to his name. following commands to attempting opening eyes, 2 fingers on R hand, and wiggles toes on R side.  L side minimal movement.   Cranial nerves: Pupils equally round and reactive to light, eyes midline, oculocephalic reflex without nystagmus, unable to assess facial droop due to ETT    Motor:  3/5 in RUE, 1/5 RLE, 1/5 in L side  Sensation: R > L   Coordination:  uncooperative  Gait: deferred    Assessment: 63 year-old male with PMH CHF, s/p AICD vs PPM, ?Afib, HTN, DM2 on insulin, CKD, and gout presented with septic shock 2/2 cellulitis c/b cardiogenic shock, became unresponsive after A-line placement.  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 No major change in his neurological exam.  Likely prolonged period of toxic-metabolic encephalopathy. Starting to awaken and more appropriate.     Plan:  Prognosis guarded, but is improving.   Consider EEG if there is fluctuation in neuro exam.   Plan as per MICU  agree w/ holding ASA  re-image w/ CT head or MRI brain if L hemiparesis continues.

## 2018-04-01 NOTE — PROGRESS NOTE ADULT - PROBLEM SELECTOR PLAN 2
Acute on chronic systolic CHF with LVEF 15 % and severely low blood pressure  Please concentrate as many IV fluid drips as possible   Will plan to keep net negative by at elast 1L per day with daily HD  Will do daily HD if necessary

## 2018-04-01 NOTE — PROGRESS NOTE ADULT - ASSESSMENT
A - respiratory failure/ brain stem cva/ encephalopathy/atn acute renal failure    Suggest:    maintain on mv  decrease lantus  feeds as order  acth test  increase midodrine  taper levo  will need trach

## 2018-04-01 NOTE — PROGRESS NOTE ADULT - SUBJECTIVE AND OBJECTIVE BOX
INTERVAL HPI/OVERNIGHT EVENTS: Central line removed.     SUBJECTIVE: Patient seen and examined at bedside. Intubated. Does not respond to verbal or painful stimuli.     OBJECTIVE:    VITAL SIGNS:  ICU Vital Signs Last 24 Hrs  T(C): 37.3 (01 Apr 2018 13:09), Max: 37.8 (31 Mar 2018 19:04)  T(F): 99.1 (01 Apr 2018 13:09), Max: 100.1 (31 Mar 2018 19:04)  HR: 90 (01 Apr 2018 16:00) (84 - 98)  BP: 84/68 (01 Apr 2018 09:00) (84/68 - 84/68)  BP(mean): 75 (01 Apr 2018 09:00) (75 - 75)  ABP: 88/54 (01 Apr 2018 16:00) (86/52 - 126/90)  ABP(mean): 64 (01 Apr 2018 16:00) (62 - 102)  RR: 12 (01 Apr 2018 16:00) (10 - 45)  SpO2: 100% (01 Apr 2018 16:06) (93% - 100%)    Mode: AC/ CMV (Assist Control/ Continuous Mandatory Ventilation), RR (machine): 10, TV (machine): 500, FiO2: 40, PEEP: 5, ITime: 0.9, MAP: 7, PIP: 14    03-31 @ 07:01 - 04-01 @ 07:00  --------------------------------------------------------  IN: 1661 mL / OUT: 2000 mL / NET: -339 mL    04-01 @ 07:01 - 04-01 @ 16:35  --------------------------------------------------------  IN: 501.6 mL / OUT: 0 mL / NET: 501.6 mL      CAPILLARY BLOOD GLUCOSE      POCT Blood Glucose.: 125 mg/dL (01 Apr 2018 12:58)      PHYSICAL EXAM:    General: Intubated.  HEENT: NC/AT; horizontal nystagmus. no tracking. PEERL  Neck: supple, R HD cath,   Respiratory: Bronchial breath sounds   Cardiovascular: +S1/S2; RRR, ii/vi systolic murmur   Abdomen: soft, distended; +BS x4  : Decreased testicular edema   Extremities:  1+ LE edema. b/l venous stasis changes. cool to touch   Vascular: WWP, 2+ peripheral pulses b/l;  Skin: normal color and turgor; no rash  Neuro: Does not open eyes. Does not respond to painful stimuli. does not follow commands     MEDICATIONS:  MEDICATIONS  (STANDING):  chlorhexidine 0.12% Liquid 15 milliLiter(s) Swish and Spit two times a day  chlorhexidine 2% Cloths 1 Application(s) Topical daily  dexamethasone  Injectable 1 milliGRAM(s) IV Push every 12 hours  dextrose 5%. 1000 milliLiter(s) (50 mL/Hr) IV Continuous <Continuous>  dextrose 50% Injectable 12.5 Gram(s) IV Push once  dextrose 50% Injectable 25 Gram(s) IV Push once  dextrose 50% Injectable 25 Gram(s) IV Push once  ergocalciferol Drops 6000 Unit(s) Oral daily  escitalopram 5 milliGRAM(s) Oral daily  folic acid 1 milliGRAM(s) Oral daily  insulin glargine Injectable (LANTUS) 13 Unit(s) SubCutaneous at bedtime  insulin lispro (HumaLOG) corrective regimen sliding scale   SubCutaneous every 6 hours  metoclopramide Injectable 5 milliGRAM(s) IV Push every 8 hours  metoprolol tartrate 12.5 milliGRAM(s) Oral every 12 hours  midodrine 5 milliGRAM(s) Oral <User Schedule>  modafinil 100 milliGRAM(s) Oral <User Schedule>  multivitamin 1 Tablet(s) Oral daily  norepinephrine Infusion 0.01 MICROgram(s)/kG/Min (1.5 mL/Hr) IV Continuous <Continuous>  pantoprazole   Suspension 40 milliGRAM(s) Oral daily  sodium chloride 0.9% lock flush 20 milliLiter(s) IV Push once  thiamine 100 milliGRAM(s) Oral daily    MEDICATIONS  (PRN):  acetaminophen    Suspension. 650 milliGRAM(s) Oral every 6 hours PRN Moderate Pain (4 - 6)  dextrose Gel 1 Dose(s) Oral once PRN Blood Glucose LESS THAN 70 milliGRAM(s)/deciliter  glucagon  Injectable 1 milliGRAM(s) IntraMuscular once PRN Glucose LESS THAN 70 milligrams/deciliter  LORazepam   Injectable 2 milliGRAM(s) IntraMuscular every 4 hours PRN Anxiety  sodium chloride 0.9% lock flush 10 milliLiter(s) IV Push every 1 hour PRN After each medication administration  sodium chloride 0.9% lock flush 10 milliLiter(s) IV Push every 12 hours PRN Lumen of catheter NOT used      ALLERGIES:  Allergies    No Known Allergies    Intolerances        LABS:                        7.5    4.5   )-----------( 34       ( 01 Apr 2018 05:43 )             24.9     04-01    138  |  98  |  34<H>  ----------------------------<  99  4.5   |  27  |  2.42<H>    Ca    8.4      01 Apr 2018 05:43  Phos  2.6     04-01  Mg     1.9     04-01    TPro  5.7<L>  /  Alb  3.3  /  TBili  1.0  /  DBili  x   /  AST  82<H>  /  ALT  129<H>  /  AlkPhos  317<H>  04-01    PT/INR - ( 01 Apr 2018 05:43 )   PT: 23.3 sec;   INR: 2.07                RADIOLOGY & ADDITIONAL TESTS: Reviewed.

## 2018-04-02 DIAGNOSIS — Z71.89 OTHER SPECIFIED COUNSELING: ICD-10-CM

## 2018-04-02 LAB
ALBUMIN SERPL ELPH-MCNC: 3 G/DL — LOW (ref 3.3–5)
ALP SERPL-CCNC: 342 U/L — HIGH (ref 40–120)
ALT FLD-CCNC: 172 U/L — HIGH (ref 10–45)
ANION GAP SERPL CALC-SCNC: 13 MMOL/L — SIGNIFICANT CHANGE UP (ref 5–17)
AST SERPL-CCNC: 175 U/L — HIGH (ref 10–40)
BILIRUB SERPL-MCNC: 0.9 MG/DL — SIGNIFICANT CHANGE UP (ref 0.2–1.2)
BUN SERPL-MCNC: 49 MG/DL — HIGH (ref 7–23)
CALCIUM SERPL-MCNC: 8.2 MG/DL — LOW (ref 8.4–10.5)
CHLORIDE SERPL-SCNC: 97 MMOL/L — SIGNIFICANT CHANGE UP (ref 96–108)
CO2 SERPL-SCNC: 25 MMOL/L — SIGNIFICANT CHANGE UP (ref 22–31)
CREAT SERPL-MCNC: 3.15 MG/DL — HIGH (ref 0.5–1.3)
GLUCOSE BLDC GLUCOMTR-MCNC: 175 MG/DL — HIGH (ref 70–99)
GLUCOSE BLDC GLUCOMTR-MCNC: 192 MG/DL — HIGH (ref 70–99)
GLUCOSE BLDC GLUCOMTR-MCNC: 193 MG/DL — HIGH (ref 70–99)
GLUCOSE BLDC GLUCOMTR-MCNC: 204 MG/DL — HIGH (ref 70–99)
GLUCOSE SERPL-MCNC: 206 MG/DL — HIGH (ref 70–99)
HCT VFR BLD CALC: 24.4 % — LOW (ref 39–50)
HGB BLD-MCNC: 7.2 G/DL — LOW (ref 13–17)
INR BLD: 2.02 — HIGH (ref 0.88–1.16)
MAGNESIUM SERPL-MCNC: 2 MG/DL — SIGNIFICANT CHANGE UP (ref 1.6–2.6)
MCHC RBC-ENTMCNC: 29.5 G/DL — LOW (ref 32–36)
MCHC RBC-ENTMCNC: 30.6 PG — SIGNIFICANT CHANGE UP (ref 27–34)
MCV RBC AUTO: 103.8 FL — HIGH (ref 80–100)
PHOSPHATE SERPL-MCNC: 2.7 MG/DL — SIGNIFICANT CHANGE UP (ref 2.5–4.5)
PLATELET # BLD AUTO: 32 K/UL — LOW (ref 150–400)
POTASSIUM SERPL-MCNC: 4.9 MMOL/L — SIGNIFICANT CHANGE UP (ref 3.5–5.3)
POTASSIUM SERPL-SCNC: 4.9 MMOL/L — SIGNIFICANT CHANGE UP (ref 3.5–5.3)
PROT SERPL-MCNC: 5.6 G/DL — LOW (ref 6–8.3)
PROTHROM AB SERPL-ACNC: 22.8 SEC — HIGH (ref 9.8–12.7)
RBC # BLD: 2.35 M/UL — LOW (ref 4.2–5.8)
RBC # FLD: 18.9 % — HIGH (ref 10.3–16.9)
SODIUM SERPL-SCNC: 135 MMOL/L — SIGNIFICANT CHANGE UP (ref 135–145)
WBC # BLD: 3.8 K/UL — SIGNIFICANT CHANGE UP (ref 3.8–10.5)
WBC # FLD AUTO: 3.8 K/UL — SIGNIFICANT CHANGE UP (ref 3.8–10.5)

## 2018-04-02 PROCEDURE — 71045 X-RAY EXAM CHEST 1 VIEW: CPT | Mod: 26

## 2018-04-02 PROCEDURE — 99231 SBSQ HOSP IP/OBS SF/LOW 25: CPT

## 2018-04-02 PROCEDURE — 99233 SBSQ HOSP IP/OBS HIGH 50: CPT

## 2018-04-02 PROCEDURE — 99291 CRITICAL CARE FIRST HOUR: CPT

## 2018-04-02 RX ORDER — ALBUMIN HUMAN 25 %
50 VIAL (ML) INTRAVENOUS
Qty: 0 | Refills: 0 | Status: COMPLETED | OUTPATIENT
Start: 2018-04-02 | End: 2018-04-02

## 2018-04-02 RX ORDER — INSULIN GLARGINE 100 [IU]/ML
13 INJECTION, SOLUTION SUBCUTANEOUS AT BEDTIME
Qty: 0 | Refills: 0 | Status: DISCONTINUED | OUTPATIENT
Start: 2018-04-02 | End: 2018-04-02

## 2018-04-02 RX ORDER — CHLORHEXIDINE GLUCONATE 213 G/1000ML
15 SOLUTION TOPICAL
Qty: 0 | Refills: 0 | Status: DISCONTINUED | OUTPATIENT
Start: 2018-04-02 | End: 2018-05-25

## 2018-04-02 RX ORDER — MIDODRINE HYDROCHLORIDE 2.5 MG/1
10 TABLET ORAL EVERY 8 HOURS
Qty: 0 | Refills: 0 | Status: DISCONTINUED | OUTPATIENT
Start: 2018-04-02 | End: 2018-04-03

## 2018-04-02 RX ORDER — INSULIN GLARGINE 100 [IU]/ML
12 INJECTION, SOLUTION SUBCUTANEOUS AT BEDTIME
Qty: 0 | Refills: 0 | Status: DISCONTINUED | OUTPATIENT
Start: 2018-04-02 | End: 2018-04-05

## 2018-04-02 RX ADMIN — Medication 1 TABLET(S): at 14:58

## 2018-04-02 RX ADMIN — Medication 4: at 22:30

## 2018-04-02 RX ADMIN — Medication 2 MILLIGRAM(S): at 14:50

## 2018-04-02 RX ADMIN — MIDODRINE HYDROCHLORIDE 10 MILLIGRAM(S): 2.5 TABLET ORAL at 14:59

## 2018-04-02 RX ADMIN — CHLORHEXIDINE GLUCONATE 15 MILLILITER(S): 213 SOLUTION TOPICAL at 11:54

## 2018-04-02 RX ADMIN — Medication 2: at 05:37

## 2018-04-02 RX ADMIN — Medication 5 MILLIGRAM(S): at 14:59

## 2018-04-02 RX ADMIN — ESCITALOPRAM OXALATE 5 MILLIGRAM(S): 10 TABLET, FILM COATED ORAL at 14:57

## 2018-04-02 RX ADMIN — Medication 1 MILLIGRAM(S): at 14:58

## 2018-04-02 RX ADMIN — Medication 1.5 MICROGRAM(S)/KG/MIN: at 17:47

## 2018-04-02 RX ADMIN — Medication 2: at 11:53

## 2018-04-02 RX ADMIN — Medication 100 MILLIGRAM(S): at 14:58

## 2018-04-02 RX ADMIN — CHLORHEXIDINE GLUCONATE 15 MILLILITER(S): 213 SOLUTION TOPICAL at 17:45

## 2018-04-02 RX ADMIN — Medication 25 MILLIGRAM(S): at 17:45

## 2018-04-02 RX ADMIN — CHLORHEXIDINE GLUCONATE 1 APPLICATION(S): 213 SOLUTION TOPICAL at 05:35

## 2018-04-02 RX ADMIN — INSULIN GLARGINE 12 UNIT(S): 100 INJECTION, SOLUTION SUBCUTANEOUS at 22:31

## 2018-04-02 RX ADMIN — MODAFINIL 100 MILLIGRAM(S): 200 TABLET ORAL at 14:58

## 2018-04-02 RX ADMIN — Medication 50 MILLILITER(S): at 14:12

## 2018-04-02 RX ADMIN — Medication 5 MILLIGRAM(S): at 05:37

## 2018-04-02 RX ADMIN — Medication 50 MILLILITER(S): at 13:22

## 2018-04-02 RX ADMIN — Medication 2: at 17:46

## 2018-04-02 RX ADMIN — PANTOPRAZOLE SODIUM 40 MILLIGRAM(S): 20 TABLET, DELAYED RELEASE ORAL at 14:59

## 2018-04-02 RX ADMIN — MODAFINIL 100 MILLIGRAM(S): 200 TABLET ORAL at 06:04

## 2018-04-02 RX ADMIN — ERGOCALCIFEROL 6000 UNIT(S): 1.25 CAPSULE ORAL at 14:57

## 2018-04-02 RX ADMIN — MIDODRINE HYDROCHLORIDE 10 MILLIGRAM(S): 2.5 TABLET ORAL at 19:36

## 2018-04-02 RX ADMIN — Medication 12.5 MILLIGRAM(S): at 05:36

## 2018-04-02 RX ADMIN — MIDODRINE HYDROCHLORIDE 5 MILLIGRAM(S): 2.5 TABLET ORAL at 05:36

## 2018-04-02 RX ADMIN — Medication 25 MILLIGRAM(S): at 05:36

## 2018-04-02 RX ADMIN — Medication 5 MILLIGRAM(S): at 22:30

## 2018-04-02 RX ADMIN — DEXMEDETOMIDINE HYDROCHLORIDE IN 0.9% SODIUM CHLORIDE 4 MICROGRAM(S)/KG/HR: 4 INJECTION INTRAVENOUS at 06:53

## 2018-04-02 RX ADMIN — Medication 50 MILLILITER(S): at 11:57

## 2018-04-02 NOTE — PROGRESS NOTE ADULT - ASSESSMENT
62 yo M history of HFrEF 10-15% 2/2 ischemic cardiomyopathy, MI , s/p AICD vs PPM, ?Afib, Hypertension, Diabetes Mellitus Type 2 on insulin, CKD?and gout, who presents with a chief complaint of generalized weakness. Now shakira due to ATN, not improving, BP more stable off pressors. Remains in fluid overload

## 2018-04-02 NOTE — PROGRESS NOTE ADULT - PROBLEM SELECTOR PLAN 4
family aware of new finding of infarcts. Family wishes to continue supportive care to allow pt more time for recovery  Family aware that decision re: Trach and PEG is needed tomorrow.

## 2018-04-02 NOTE — PROGRESS NOTE ADULT - SUBJECTIVE AND OBJECTIVE BOX
Patient was seen and evaluated on dialysis.   Patient is tolerating the procedure well.   HR: 80 (04-02-18 @ 13:00)  BP: 76/69 (04-02-18 @ 13:00)  Continue dialysis:   Dialyzer: Revaclear 300 QB: 200 QD: 500 3K bath    Goal UF 2kg over 3 Hours

## 2018-04-02 NOTE — PROGRESS NOTE ADULT - ASSESSMENT
63M PMH HFrEF 10-15% (ischemic), MI, s/p AICD vs PPM, possible Afib, HTN, DM2 on insulin, possible CKD, and gout, who presents with a chief complaint of generalized weakness s/p septic shock likely 2/2 LE cellulitis. Now with AMS likely from brainstem infarct.     NEURO  #AMS  - Pt non responsive since last week. VItal signs normal during the event. Pt intubated for airway protection. Stroke code called. CT, CTA negative. VEEG in place- moderate slowing but no seizure activity.   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 Per neuro, event could have resulted from hypoperfusion.   - Started on Modafinil.   - no seizure activity on VEEG. repeated yesterday given purposeless head movement. Will f/u read.  - Unclear neuro prognosis at this point.  -  TTE with Affinity shows no clot.   - neuro team consulted. f/u further recs     #   ID- septic shock 2/2 Cellulitis-   - Increased midodrine to 5 q6 given hypotension. Continues to require Levophed. Low BP since intermittent HD was started.   - steroids changed back to solucortef for this AM after ACTH stim test was borderline normal   -.Discontinued zosyn for suspected UTI,(3/25-3/26)  s/p Zosyn (3/11-2/21). Was previously also on Vanco (3/11-3/17).   - RUQ dopper shows no portal vein thrombosis. given worsening transaminitis and increased residuals RUQ sono was obtained. f/u read   - TTE with no vegetations. Pt not stable for RUKHSANA.   - Gallium scan read showed LLE cellulitis.     - repeat LE CT does not show abscess or gas.   - R lung Effusion likely from overload. s/p thoracentesis with improvement in resp status   -  HIV negative  - Vascular surgery on board. Recommending Amputation as an option if pt continues to be unstable.     #UTI- Urine cloudy appearing with increase WBC.  - discontinued zosyn 3/25-3/26    CARDIOVASCULAR   # HYPOTENSION-   -  Now on pressors and increased midodrine to q6h to maintain SBP>65. Low BP since switch to HD   -  HD started Friday, also dialyzed on  Saturday . OFF CVVHD  - pending transfer to CCU  - Gallium scan with only LLE cellulitis   - Lactate downtrended to WNL   - CHF with severely reduced EF 15%, caution with fluids. Per renal recs, can give IV albumin for fluids.    # CHF exacerbation  - CHF with EF 10-15% per pt 2/2 ischemic cardiomyopathy s/p AICD as indicated by CXR   - Elevated BNP on admission with R sided effusion and edema  - Cardiac shock contributing to hypotension   - improvement in pulm congestion noted on chest Xray.   - Give fluids judiciously given low EF in setting of likely sepsis  - Unclear medical regimen opt. Per [facility] pharmacy ( and [place]) pt has not picked up Torsemide 20 or Metoprolol 100 since November.   - Hold ACE/Metoprolol in setting of sepsis  - CVO saturation improved after transfusion    #AFIB  - Metoprolol 12.5 q12h for rate control   - PT with hx of Afib and LV thrombus on coumadin.    -d/c  Hep drip given concern of HIT. d/c Argatroban.  - Dose coumadin daily. OG tube placed. Dose held yesterday given thrombocytopenia   - Pt s/p FFP (3/13,3/14) and Vit K (3/13) for thoracocentesis and HD catheter placement.   -TTE with affinity shows no  LV thrombus        #CAD- Hx of MI s/p AICD  - Pt with pLAD in 7/2017, was started on Aspirin and Brelinta per records.  - On asp 81, consider re-instating Atorvastatin 80 (discontinued 2/2 LFT uptrend).   - Holding aspirin and Coumadin given thrombocytopenia     # LE Edema   - LE edema with chronic venous stasis.   - Duplex does not show DVT    PULMONARY   Intubated  - Intubated on 3/22 for airway protection   - Will attempt CPAP trial and extubation once patient more responsive   - WIll discuss trach with family and HCP     #Acute resp failure- Resolved   - Intubated for airway protection   - pt with R loculated plural effusion noted to have increased work of breathing. Likely 2/2 fluid overload, Fluid studies consistent with exudative effusion.   - s/p thoracentesis on 3/13 with R chest tube placement. Chest tube removed 3/15.   - s/p  pRBC transfusion on 3/12. 3/16  - Monitor resp status  - Appreciate CHF team recs regarding fluid status. Continue dialysis for fluid removal      #RENAL   #ARF- 2/2 ischemic ATN  -Unknown baseline Cr presenting with Cr 3.93 with metabolic derangements.   - Likely 2/2 Sepsis, low intravascular volume and CHF  - Trend BUN and Cr.  - Renal team on board, pt with R HD catheter placed by IR on 3/21. Switching to HD.   - Pt hypotensive since the switch   - CT abdomen and pelvis without acute obstruction.   - retroperitoneal ultrasound showing medical renal disease.     #Hyperkalemia   -Resolved   - PT with elevated K to 5.7 on admission,  no EKG changes   - Continue telemetry monitoring  - Trend K   - Can given Kayexalate if persists   - c/w dialysis     #Metabolic acidosis   - 2/2 Lactate, starvation ketoacidosis and uremia   - resolved       #GI   - pt wit transaminits. Abd distended, increased residuals   - Will obtain RUQ US, performed pending read   - Reglan for motility.   - prior CT abdomen/pelvis consistent with cholelithiasis and ascites.     #HEME  #Thrombocytopenia  - ELVIRA negative but PF4 was positive. Unlikely HIT.    - Could have been related to zosyn or sepsis   - Still downtrending. Will hold coumadin and Aspirin  -. Monitor for signs of bleeding      # elevated INR. Unclear etiology. Pt on coumadin?   - Continue with daily coumadin dosing. Hold tonight   - Given Vit K and FFP3/12. FFP 3/13   - Monitor coags    #Anemia  - Downtrending this AM. Likely 2/2 phlebotomy, no signs of bleeding. Will monitor given thrombocytopenia.   - Prior studies showed Anemia of chronic disease.  -s/p 1pRBC on 3/12, 3/16   - WIll keep Hb around 9-10     #ENDOCRINE   - S/p insulin drip  - decreasing Lantus 13U given hypoglycemia     - Will adjust as we taper down steroids.    Monitor blood glucose and ISS coverage. pt with episodes of hypoglycemia.   - Monitor blood glucose   - A1C 8.6%      F: No IVF   E: Replete K<4 Mg<2, caution in setting of acute renal failure  N: OG tube placed, 1.5 Glucerna      Lines:  ETT (3/22), OG tube (3/21)R HD catheter (3/21)., L Midline 3/20,    Full code- Will continue discussing GOC and trach with HCP and family   Dispo: MICU  Ppx: Coumadin (Held), PPI 63M PMH HFrEF 10-15% (ischemic), MI, s/p AICD vs PPM, possible Afib, HTN, DM2 on insulin, possible CKD, and gout, who presents with a chief complaint of generalized weakness s/p septic shock likely 2/2 LE cellulitis. Now with AMS likely from brainstem infarct.     NEURO  #AMS  - Pt non responsive since last week. VItal signs normal during the event. Pt intubated for airway protection. Stroke code called. CT, CTA negative. VEEG in place- moderate slowing but no seizure activity.   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 Per neuro, event could have resulted from hypoperfusion.   - Started on Modafinil.   - no seizure activity on VEEG. repeated yesterday given purposeless head movement. Will f/u read.  - Unclear neuro prognosis at this point.  -  TTE with Affinity shows no clot.   - neuro team consulted. f/u further recs     #   ID- septic shock 2/2 Cellulitis-   - Increased midodrine to 10q6 given hypotension. WContinues to require Levophed. Low BP since intermittent HD was started.   - steroids changed back to solucortef yesterady after ACTH stim test was borderline normal. Will keep steroids on.  -.Discontinued zosyn for suspected UTI,(3/25-3/26)  s/p Zosyn (3/11-2/21). Was previously also on Vanco (3/11-3/17).   - RUQ dopper shows no portal vein thrombosis. given worsening transaminitis and increased residuals RUQ sono was obtained which only shows cholelithiasis.   - TTE with no vegetations. Pt not stable for RUKHSANA.   - Gallium scan read showed LLE cellulitis.     - repeat LE CT does not show abscess or gas.   - R lung Effusion likely from overload. s/p thoracentesis with improvement in resp status   - HIV negative  - Vascular surgery on board. Recommending Amputation as an option if pt continues to be unstable.     #UTI- Urine cloudy appearing with increase WBC.  - discontinued zosyn 3/25-3/26    CARDIOVASCULAR   # HYPOTENSION-   -  Increased midodrine to 10 q6h to maintain SBP>65 and to attempt weaning off Levophed.  Low BP since switch to HD   -  HD started Friday, also dialyzed on  Saturday . OFF CVVHD  - pending transfer to CCU  - Gallium scan with only LLE cellulitis   - Lactate downtrended to WNL   - CHF with severely reduced EF 15%, caution with fluids. Per renal recs, can give IV albumin for fluids.    # CHF exacerbation  - CHF with EF 10-15% per pt 2/2 ischemic cardiomyopathy s/p AICD as indicated by CXR   - Elevated BNP on admission with R sided effusion and edema  - Cardiac shock contributing to hypotension   - improvement in pulm congestion noted on chest Xray.   - Give fluids judiciously given low EF in setting of likely sepsis  - Unclear medical regimen opt. Per [facility] pharmacy ( and [place]) pt has not picked up Torsemide 20 or Metoprolol 100 since November.   - Hold ACE/Metoprolol in setting of sepsis  - CVO saturation improved after transfusion    #AFIB  - Discontinuing Metoprolol 12.5 q12h given hypotension. Will use Dig for rate control if RVR   - PT with hx of Afib and LV thrombus on coumadin.    -d/c  Hep drip given concern of HIT. d/c Argatroban.  - Dose coumadin daily. OG tube placed. Dose held yesterday given thrombocytopenia. Will continue holding  - Pt s/p FFP (3/13,3/14) and Vit K (3/13) for thoracocentesis and HD catheter placement.   -TTE with affinity shows no  LV thrombus        #CAD- Hx of MI s/p AICD  - Pt with pLAD in 7/2017, was started on Aspirin and Brelinta per records.  - On asp 81, consider re-instating Atorvastatin 80 (discontinued 2/2 LFT uptrend, thrombocytopenia).   - Holding aspirin and Coumadin given thrombocytopenia     # LE Edema   - LE edema with chronic venous stasis.   - Duplex does not show DVT    PULMONARY   Intubated  - Intubated on 3/22 for airway protection   - Will attempt CPAP trial and extubation once patient more responsive   - In discussion with family and palliative about trach     #Acute resp failure- Resolved   - Intubated for airway protection   - pt with R loculated plural effusion noted to have increased work of breathing. Likely 2/2 fluid overload, Fluid studies consistent with exudative effusion.   - s/p thoracentesis on 3/13 with R chest tube placement. Chest tube removed 3/15.   - s/p  pRBC transfusion on 3/12. 3/16  - Monitor resp status  - Appreciate CHF team recs regarding fluid status. Continue dialysis for fluid removal      #RENAL   #ARF- 2/2 ischemic ATN  -Unknown baseline Cr presenting with Cr 3.93 with metabolic derangements.   - Likely 2/2 Sepsis, low intravascular volume and CHF  - Trend BUN and Cr.  - Renal team on board, pt with R HD catheter placed by IR on 3/21. Switching to HD on friday  - Pt to undergo Hd session today. Underwent HD on Friday and Saturday   - Pt hypotensive since the switch.   - CT abdomen and pelvis without acute obstruction.   - retroperitoneal ultrasound showing medical renal disease.     #Hyperkalemia   -Resolved   - PT with elevated K to 5.7 on admission,  no EKG changes   - Continue telemetry monitoring  - Trend K   - Can given Kayexalate if persists   - c/w dialysis     #Metabolic acidosis   - 2/2 Lactate, starvation ketoacidosis and uremia   - resolved       #GI   - pt wit transaminits. Abd distended, increased residuals   -  RUQ US with cholelithiasis   - Reglan for motility.   - prior CT abdomen/pelvis consistent with cholelithiasis and ascites.     #HEME  #Thrombocytopenia  - ELVIRA negative but PF4 was positive. Unlikely HIT.    - Could have been related to zosyn or sepsis   - Still downtrending. Will hold coumadin and Aspirin  -. Monitor for signs of bleeding      # elevated INR. Unclear etiology. Pt on coumadin?   - Continue with daily coumadin dosing. Hold tonight   - Given Vit K and FFP3/12. FFP 3/13   - Monitor coags    #Anemia  - Downtrending this AM. Likely 2/2 phlebotomy, no signs of bleeding. Will monitor given thrombocytopenia.   - Prior studies showed Anemia of chronic disease.  -s/p 1pRBC on 3/12, 3/16   - WIll keep Hb around 9-10     #ENDOCRINE   - S/p insulin drip  - c/w Lantus 13U given hypoglycemia     - Will adjust as we taper down steroids.    Monitor blood glucose and ISS coverage. pt with episodes of hypoglycemia.   - Monitor blood glucose   - A1C 8.6%      F: No IVF   E: Replete K<4 Mg<2, caution in setting of acute renal failure  N: OG tube placed, 1.5 Glucerna      Lines:  ETT (3/22), OG tube (3/21)R HD catheter (3/21)., L Midline 3/20,  Drips: Levophed     Full code- Will continue discussing GOC and trach with HCP and family   Dispo: MICU  Ppx: Coumadin (Held), PPI 63M PMH HFrEF 10-15% (ischemic), MI, s/p AICD vs PPM, possible Afib, HTN, DM2 on insulin, possible CKD, and gout, who presents with a chief complaint of generalized weakness s/p septic shock likely 2/2 LE cellulitis. Now with AMS likely from brainstem infarct.     NEURO  #AMS  - Pt non responsive since last week. VItal signs normal during the event. Pt intubated for airway protection. Stroke code called. CT, CTA negative. VEEG in place- moderate slowing but no seizure activity.   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 Per neuro, event could have resulted from hypoperfusion.   - Started on Modafinil.   - no seizure activity on VEEG. repeated yesterday given purposeless head movement. Will f/u read.  - Unclear neuro prognosis at this point.  -  TTE with Affinity shows no clot.   - neuro team consulted. f/u further recs     #   ID- septic shock 2/2 Cellulitis-   - Increased midodrine to 10q6 given hypotension. WContinues to require Levophed. Low BP since intermittent HD was started.   - steroids changed back to solucortef yesterady after ACTH stim test was borderline normal. Will keep steroids on.  -.Discontinued zosyn for suspected UTI,(3/25-3/26)  s/p Zosyn (3/11-2/21). Was previously also on Vanco (3/11-3/17).   - RUQ dopper shows no portal vein thrombosis. given worsening transaminitis and increased residuals RUQ sono was obtained which only shows cholelithiasis.   - TTE with no vegetations. Pt not stable for RUKHSANA.   - Gallium scan read showed LLE cellulitis.     - repeat LE CT does not show abscess or gas.   - R lung Effusion likely from overload. s/p thoracentesis with improvement in resp status   - HIV negative  - Vascular surgery on board. Recommending Amputation as an option if pt continues to be unstable.     #UTI- Urine cloudy appearing with increase WBC.  - discontinued zosyn 3/25-3/26    CARDIOVASCULAR   # HYPOTENSION-   -  Increased midodrine to 10 q6h to maintain SBP>65 and to attempt weaning off Levophed.  Low BP since switch to HD   -  HD started Friday, also dialyzed on  Saturday . OFF CVVHD  - pending transfer to CCU  - Gallium scan with only LLE cellulitis   - Lactate downtrended to WNL   - CHF with severely reduced EF 15%, caution with fluids. Per renal recs, can give IV albumin for fluids.    # CHF exacerbation  - CHF with EF 10-15% per pt 2/2 ischemic cardiomyopathy s/p AICD as indicated by CXR   - Elevated BNP on admission with R sided effusion and edema  - Cardiac shock contributing to hypotension   - improvement in pulm congestion noted on chest Xray.   - Give fluids judiciously given low EF in setting of likely sepsis but for now try to obtain negative fluid balance with dialysis, watching BP  - Unclear medical regimen opt. Per [facility] pharmacy ( and [place]) pt has not picked up Torsemide 20 or Metoprolol 100 since November.   - Hold ACE/Metoprolol in setting of sepsis  - CVO saturation improved after transfusion    #AFIB  - Discontinuing Metoprolol 12.5 q12h given hypotension. Will use Dig for rate control if RVR   - PT with hx of Afib and LV thrombus on coumadin.    -d/c  Hep drip given concern of HIT. d/c Argatroban.  - Dose coumadin daily. OG tube placed. Dose held yesterday given thrombocytopenia. Will continue holding  - Pt s/p FFP (3/13,3/14) and Vit K (3/13) for thoracocentesis and HD catheter placement.   -TTE with affinity shows no  LV thrombus        #CAD- Hx of MI s/p AICD  - Pt with pLAD in 7/2017, was started on Aspirin and Brelinta per records.  - On asp 81, consider re-instating Atorvastatin 80 (discontinued 2/2 LFT uptrend, thrombocytopenia).   - Holding aspirin and Coumadin given thrombocytopenia     # LE Edema   - LE edema with chronic venous stasis.   - Duplex does not show DVT    PULMONARY   Intubated  - Intubated on 3/22 for airway protection   - Will attempt CPAP trial and extubation once patient more responsive   - In discussion with family and palliative about trach     #Acute resp failure- Resolved   - Intubated for airway protection   - pt with R loculated plural effusion noted to have increased work of breathing. Likely 2/2 fluid overload, Fluid studies consistent with exudative effusion.   - s/p thoracentesis on 3/13 with R chest tube placement. Chest tube removed 3/15.   - s/p  pRBC transfusion on 3/12. 3/16  - Monitor resp status  - Appreciate CHF team recs regarding fluid status. Continue dialysis for fluid removal      #RENAL   #ARF- 2/2 ischemic ATN  -Unknown baseline Cr presenting with Cr 3.93 with metabolic derangements.   - Likely 2/2 Sepsis, low intravascular volume and CHF  - Trend BUN and Cr.  - Renal team on board, pt with R HD catheter placed by IR on 3/21. Switching to HD on friday  - Pt to undergo Hd session today. Underwent HD on Friday and Saturday   - Pt hypotensive since the switch.   - CT abdomen and pelvis without acute obstruction.   - retroperitoneal ultrasound showing medical renal disease.     #Hyperkalemia   -Resolved   - PT with elevated K to 5.7 on admission,  no EKG changes   - Continue telemetry monitoring  - Trend K   - Can given Kayexalate if persists   - c/w dialysis     #Metabolic acidosis   - 2/2 Lactate, starvation ketoacidosis and uremia   - resolved       #GI   - pt wit transaminits. Abd distended, increased residuals   -  RUQ US with cholelithiasis   - Reglan for motility.   - prior CT abdomen/pelvis consistent with cholelithiasis and ascites.     #HEME  #Thrombocytopenia  - ELVIRA negative but PF4 was positive. Unlikely HIT.    - Could have been related to zosyn or sepsis   - Still downtrending. Will hold coumadin and Aspirin  -. Monitor for signs of bleeding      # elevated INR. Unclear etiology. Pt on coumadin?   - Continue with daily coumadin dosing. Hold tonight   - Given Vit K and FFP3/12. FFP 3/13   - Monitor coags    #Anemia  - Downtrending this AM. Likely 2/2 phlebotomy, no signs of bleeding. Will monitor given thrombocytopenia.   - Prior studies showed Anemia of chronic disease.  -s/p 1pRBC on 3/12, 3/16   - WIll keep Hb around 9-10     #ENDOCRINE   - S/p insulin drip  - c/w Lantus 13U given hypoglycemia     - Will adjust as we taper down steroids.    Monitor blood glucose and ISS coverage. pt with episodes of hypoglycemia.   - Monitor blood glucose   - A1C 8.6%      F: No IVF   E: Replete K<4 Mg<2, caution in setting of acute renal failure  N: OG tube placed, 1.5 Glucerna      Lines:  ETT (3/22), OG tube (3/21)R HD catheter (3/21)., L Midline 3/20,  Drips: Levophed     Full code- Will continue discussing GOC and trach with HCP and family   Dispo: MICU  Ppx: Coumadin (Held), PPI 63M PMH HFrEF 10-15% (ischemic), MI, s/p AICD vs PPM, possible Afib, HTN, DM2 on insulin, possible CKD, and gout, who presents with a chief complaint of generalized weakness s/p septic shock likely 2/2 LE cellulitis. Now with AMS likely from brainstem infarct and toxic metabolic encephalopathy.     NEURO  #AMS  - Pt non responsive since last week. VItal signs normal during the event. Pt intubated for airway protection. Stroke code called. CT, CTA negative. VEEG in place- moderate slowing but no seizure activity.   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 Per neuro, event could have resulted from hypoperfusion.   - Started on Modafinil.   - no seizure activity on VEEG. repeated yesterday given purposeless head movement. Will f/u read.  - Unclear neuro prognosis at this point.  -  TTE with Affinity shows no clot.   - neuro team consulted. f/u further recs     #   ID- septic shock 2/2 Cellulitis-   - Increased midodrine to 10q6 given hypotension. WContinues to require Levophed. Low BP since intermittent HD was started.   - steroids changed back to solucortef yesterady after ACTH stim test was borderline normal. Will keep steroids on.  -.Discontinued zosyn for suspected UTI,(3/25-3/26)  s/p Zosyn (3/11-2/21). Was previously also on Vanco (3/11-3/17).   - RUQ dopper shows no portal vein thrombosis. given worsening transaminitis and increased residuals RUQ sono was obtained which only shows cholelithiasis.   - TTE with no vegetations. Pt not stable for RUKHSANA.   - Gallium scan read showed LLE cellulitis.     - repeat LE CT does not show abscess or gas.   - R lung Effusion likely from overload. s/p thoracentesis with improvement in resp status   - HIV negative  - Vascular surgery on board. Recommending Amputation as an option if pt continues to be unstable.     #UTI- Urine cloudy appearing with increase WBC.  - discontinued zosyn 3/25-3/26    CARDIOVASCULAR   # HYPOTENSION-   -  Increased midodrine to 10 q6h to maintain SBP>65 and to attempt weaning off Levophed.  Low BP since switch to HD   -  HD started Friday, also dialyzed on  Saturday . OFF CVVHD  - pending transfer to CCU  - Gallium scan with only LLE cellulitis   - Lactate downtrended to WNL   - CHF with severely reduced EF 15%, caution with fluids. Per renal recs, can give IV albumin for fluids.    # CHF exacerbation  - CHF with EF 10-15% per pt 2/2 ischemic cardiomyopathy s/p AICD as indicated by CXR   - Elevated BNP on admission with R sided effusion and edema  - Cardiac shock contributing to hypotension   - improvement in pulm congestion noted on chest Xray.   - Give fluids judiciously given low EF in setting of likely sepsis but for now try to obtain negative fluid balance with dialysis, watching BP  - Unclear medical regimen opt. Per [facility] pharmacy ( and [place]) pt has not picked up Torsemide 20 or Metoprolol 100 since November.   - Hold ACE/Metoprolol in setting of sepsis  - CVO saturation improved after transfusion    #AFIB  - Discontinuing Metoprolol 12.5 q12h given hypotension. Will use Dig for rate control if RVR   - PT with hx of Afib and LV thrombus on coumadin.    -d/c  Hep drip given concern of HIT. d/c Argatroban.  - Dose coumadin daily. OG tube placed. Dose held yesterday given thrombocytopenia. Will continue holding  - Pt s/p FFP (3/13,3/14) and Vit K (3/13) for thoracocentesis and HD catheter placement.   -TTE with affinity shows no  LV thrombus        #CAD- Hx of MI s/p AICD  - Pt with pLAD in 7/2017, was started on Aspirin and Brelinta per records.  - On asp 81, consider re-instating Atorvastatin 80 (discontinued 2/2 LFT uptrend, thrombocytopenia).   - Holding aspirin and Coumadin given thrombocytopenia     # LE Edema   - LE edema with chronic venous stasis.   - Duplex does not show DVT    PULMONARY   Intubated  - Intubated on 3/22 for airway protection   - Will attempt CPAP trial and extubation once patient more responsive   - In discussion with family and palliative about trach     #Acute resp failure- Resolved   - Intubated for airway protection   - pt with R loculated plural effusion noted to have increased work of breathing. Likely 2/2 fluid overload, Fluid studies consistent with exudative effusion.   - s/p thoracentesis on 3/13 with R chest tube placement. Chest tube removed 3/15.   - s/p  pRBC transfusion on 3/12. 3/16  - Monitor resp status  - Appreciate CHF team recs regarding fluid status. Continue dialysis for fluid removal      #RENAL   #ARF- 2/2 ischemic ATN  -Unknown baseline Cr presenting with Cr 3.93 with metabolic derangements.   - Likely 2/2 Sepsis, low intravascular volume and CHF  - Trend BUN and Cr.  - Renal team on board, pt with R HD catheter placed by IR on 3/21. Switching to HD on friday  - Pt to undergo Hd session today. Underwent HD on Friday and Saturday   - Pt hypotensive since the switch.   - CT abdomen and pelvis without acute obstruction.   - retroperitoneal ultrasound showing medical renal disease.     #Hyperkalemia   -Resolved   - PT with elevated K to 5.7 on admission,  no EKG changes   - Continue telemetry monitoring  - Trend K   - Can given Kayexalate if persists   - c/w dialysis     #Metabolic acidosis   - 2/2 Lactate, starvation ketoacidosis and uremia   - resolved       #GI   - pt wit transaminits. Abd distended, increased residuals   -  RUQ US with cholelithiasis   - Reglan for motility.   - prior CT abdomen/pelvis consistent with cholelithiasis and ascites.     #HEME  #Thrombocytopenia  - ELVIRA negative but PF4 was positive. Unlikely HIT.    - Could have been related to zosyn or sepsis   - Still downtrending. Will hold coumadin and Aspirin  -. Monitor for signs of bleeding      # elevated INR. Unclear etiology. Pt on coumadin?   - Continue with daily coumadin dosing. Hold tonight   - Given Vit K and FFP3/12. FFP 3/13   - Monitor coags    #Anemia  - Downtrending this AM. Likely 2/2 phlebotomy, no signs of bleeding. Will monitor given thrombocytopenia.   - Prior studies showed Anemia of chronic disease.  -s/p 1pRBC on 3/12, 3/16   - WIll keep Hb around 9-10     #ENDOCRINE   - S/p insulin drip  - c/w Lantus 13U given hypoglycemia     - Will adjust as we taper down steroids.    Monitor blood glucose and ISS coverage. pt with episodes of hypoglycemia.   - Monitor blood glucose   - A1C 8.6%      F: No IVF   E: Replete K<4 Mg<2, caution in setting of acute renal failure  N: OG tube placed, 1.5 Glucerna      Lines:  ETT (3/22), OG tube (3/21)R HD catheter (3/21)., L Midline 3/20,  Drips: Levophed     Full code- Will continue discussing GOC and trach with HCP and family   Dispo: MICU  Ppx: Coumadin (Held), PPI

## 2018-04-02 NOTE — PROGRESS NOTE ADULT - SUBJECTIVE AND OBJECTIVE BOX
CC: SEPSIS DUE TO UNSPECIFIEDORGANISM, CELLULITIS OF      INTERVAL HISTORY:  intubated      ROS: No chest pain, no sob, no abd pain. No n/v/d    PAST MEDICAL & SURGICAL HISTORY:  Type 2 diabetes mellitus with diabetic peripheral angiopathy without gangrene, with long-term curren  Essential hypertension, benign  Gout  Pacemaker  Chronic systolic heart failure  Myocardial infarction  No significant past surgical history      PHYSICAL EXAM:  T(C): 37.6 (04-02-18 @ 09:19), Max: 37.9 (04-01-18 @ 17:36)  HR: 80 (04-02-18 @ 11:00)  BP: 85/69 (04-02-18 @ 11:00) (75/67 - 85/72)  RR: 23 (04-02-18 @ 11:00)  SpO2: 100% (04-02-18 @ 11:00)  Wt(kg): --  I&O's Summary    01 Apr 2018 07:01  -  02 Apr 2018 07:00  --------------------------------------------------------  IN: 2075.2 mL / OUT: 0 mL / NET: 2075.2 mL    02 Apr 2018 07:01  -  02 Apr 2018 12:02  --------------------------------------------------------  IN: 241.5 mL / OUT: 0 mL / NET: 241.5 mL      Weight   General:intubated  HEENT: moist mucous membranes, no pallor/cyanosis.  Neck: no JVD visible.  Cardiac: S1, S2. RRR. No murmurs   Respratory: CTA b/l, no access muscle use.   Abdomen: soft. nontender. nondistended  Skin: no rashes.  Extremities: no LE edema b/l  Access: IJ catheter      DATA:                        7.2<L>  3.8   )-----------( 32<L>    ( 02 Apr 2018 04:37 )             24.4<L>    Ferritin, Serum: 1377 ng/mL <H> (03-12 @ 11:20)      135    |  97     |  49<H>  ----------------------------<  206<H>  Ca:8.2<L> (02 Apr 2018 04:37)  4.9     |  25     |  3.15<H>      eGFR if Non : 20 <L>  eGFR if : 23 <L>    TPro  5.6<L>  /  Alb  3.0<L>  /  TBili  0.9    /  DBili  x      /  AST  175<H>  /  ALT  172<H>  /  AlkPhos  342<H>  02 Apr 2018 04:37      Vitamin D, 1, 25-Dihydroxy: 12.7 pg/mL <L> [19.9 - 79.3] (03-21 @ 14:56)  Vitamin D, 25-Hydroxy: 8.6 ng/mL <L> [30.0 - 80.0] (03-21 @ 14:56)        Protein/Creatinine Ratio Calculation: 0.9 Ratio <H> (03-15 @ 09:29)          MEDICATIONS  (STANDING):  albumin human 25% IVPB 50 milliLiter(s) IV Intermittent every 1 hour  chlorhexidine 0.12% Liquid 15 milliLiter(s) Swish and Spit two times a day  chlorhexidine 2% Cloths 1 Application(s) Topical daily  dextrose 5%. 1000 milliLiter(s) (50 mL/Hr) IV Continuous <Continuous>  dextrose 50% Injectable 12.5 Gram(s) IV Push once  dextrose 50% Injectable 25 Gram(s) IV Push once  dextrose 50% Injectable 25 Gram(s) IV Push once  ergocalciferol Drops 6000 Unit(s) Oral daily  escitalopram 5 milliGRAM(s) Oral daily  folic acid 1 milliGRAM(s) Oral daily  hydrocortisone sodium succinate Injectable 25 milliGRAM(s) IV Push every 12 hours  insulin glargine Injectable (LANTUS) 9 Unit(s) SubCutaneous at bedtime  insulin lispro (HumaLOG) corrective regimen sliding scale   SubCutaneous every 6 hours  metoclopramide Injectable 5 milliGRAM(s) IV Push every 8 hours  midodrine 10 milliGRAM(s) Oral every 8 hours  modafinil 100 milliGRAM(s) Oral <User Schedule>  multivitamin 1 Tablet(s) Oral daily  norepinephrine Infusion 0.01 MICROgram(s)/kG/Min (1.5 mL/Hr) IV Continuous <Continuous>  pantoprazole   Suspension 40 milliGRAM(s) Oral daily  sodium chloride 0.9% lock flush 20 milliLiter(s) IV Push once  thiamine 100 milliGRAM(s) Oral daily    MEDICATIONS  (PRN):  acetaminophen    Suspension. 650 milliGRAM(s) Oral every 6 hours PRN Moderate Pain (4 - 6)  dextrose Gel 1 Dose(s) Oral once PRN Blood Glucose LESS THAN 70 milliGRAM(s)/deciliter  glucagon  Injectable 1 milliGRAM(s) IntraMuscular once PRN Glucose LESS THAN 70 milligrams/deciliter  LORazepam   Injectable 2 milliGRAM(s) IV Push every 4 hours PRN Anxiety  sodium chloride 0.9% lock flush 10 milliLiter(s) IV Push every 1 hour PRN After each medication administration  sodium chloride 0.9% lock flush 10 milliLiter(s) IV Push every 12 hours PRN Lumen of catheter NOT used

## 2018-04-02 NOTE — PROGRESS NOTE ADULT - PROBLEM SELECTOR PLAN 6
Advance care planning meeting  Start time:  100pm  End time: 120pm  Total time: 20 min     A face to face meeting to discuss advance care planning was held today regarding: HUNTER BRIZUELA  Primary decision maker:   pts dgkyleigh Cristal  Alternate/surrogate:  none  Discussed advance directives including, but not limited to, healthcare proxy and code status.  Medical update provided to pt's HCP in pt's room.  Reviewed pt's overall condition. Pt has been intubated x 10 days. Pts HCP needs to make a plan of tracheostomy and PEG tube placement.  HCP is asking for 1 more day to decide.  Trach and PEG vs not continuing life support discussed.  Also stressed that if pt has an ongoing trial of life support, but doesn't improve in X months, withdrawal of life support would be possible, if HCP wanted, but that may not be feasible in a SNF setting. HCP will consider tonight.   Decision regarding code status: full code  Documentation completed today:  none

## 2018-04-02 NOTE — PROGRESS NOTE ADULT - SUBJECTIVE AND OBJECTIVE BOX
HUNTER BRIZUELA   MRN-[id number]     (1955):     HPI:  64 yo M history of HFrEF 10-15% 2/2 ischemic cardiomyopathy, MI , s/p AICD vs PPM, ?Afib, Hypertension, Diabetes Mellitus Type 2 on insulin, CKD?and gout, who presents with a chief complaint of generalized weakness. Pt wad admitted to [facility] 2 months ago for gout and was sent to rehab. He was discharged home mid Feb with VNS. In the past 2 weeks patient has noted increased leg pain and weakness bilaterally. In the last few days, he has also experienced generalized weakness prompting him to come to ER.   In ER, noted to have t max of 102, SBP 70s, leukocytosis to 32.8, Lactate of 6.6, Acute renal failure with severe metabolic derangements. Given 3.5L fluids and Vanc/Zosyn. MICU consulted. (10 Mar 2018 18:51)    Pt has had a complicated course including pleural effusion with chest tube placement, renal failure with both CVVHD and then HD and significant hypotension requiring vasopressors.  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  Pt was converted back to CVVHD due to his HD related hypotension.   Last night, pt had an episode of unresponsiveness.  Pt was intubated for airway protection.  Imaging was negative for CVA.  VEEG in place.  Pt remains on CVVHD and is on vasopressin and levophed.   He is lightly sedated at this time and can respond weakly to intermittent simple commands.     Pt remains intubated  He is off sedation and has a poor mental status.  Pt continues on CVVHD but is currently off pressors. He is hypothermic and  that is thought to be correlated with new IV fluid bags with CVVHD.   CVP around 12    Pt received Provigil this am and is more engaging, al be it briefly. Pt will follow some simple commands.  Pt now on intermittent HD, back on levophed, intubated x 10 days.       ROS:   nonverbal as pt is intubated.   Unable to attain due to:  oral intubation                     Dyspnea (Heriberto 0-10):                       N/V (Y/N):                            Secretions (Y/N):              Agitation(Y/N):  Pain (Y/N):       -Provocation/Palliation:  -Quality/Quantity:  -Radiating:  -Severity:  -Timing/Frequency:  -Impact on ADLs:    Allergies:  No Known Allergies    Intolerances    Opiate Naive (Y/N):   yes  -iStop reviewed (Y/N):  yes ref # [id number]    MEDICATIONS  (STANDING):  albumin human 25% IVPB 50 milliLiter(s) IV Intermittent every 1 hour  chlorhexidine 0.12% Liquid 15 milliLiter(s) Swish and Spit two times a day  chlorhexidine 2% Cloths 1 Application(s) Topical daily  dextrose 5%. 1000 milliLiter(s) (50 mL/Hr) IV Continuous <Continuous>  dextrose 50% Injectable 12.5 Gram(s) IV Push once  dextrose 50% Injectable 25 Gram(s) IV Push once  dextrose 50% Injectable 25 Gram(s) IV Push once  ergocalciferol Drops 6000 Unit(s) Oral daily  escitalopram 5 milliGRAM(s) Oral daily  folic acid 1 milliGRAM(s) Oral daily  hydrocortisone sodium succinate Injectable 25 milliGRAM(s) IV Push every 12 hours  insulin glargine Injectable (LANTUS) 9 Unit(s) SubCutaneous at bedtime  insulin lispro (HumaLOG) corrective regimen sliding scale   SubCutaneous every 6 hours  metoclopramide Injectable 5 milliGRAM(s) IV Push every 8 hours  midodrine 10 milliGRAM(s) Oral every 8 hours  modafinil 100 milliGRAM(s) Oral <User Schedule>  multivitamin 1 Tablet(s) Oral daily  norepinephrine Infusion 0.01 MICROgram(s)/kG/Min (1.5 mL/Hr) IV Continuous <Continuous>  pantoprazole   Suspension 40 milliGRAM(s) Oral daily  sodium chloride 0.9% lock flush 20 milliLiter(s) IV Push once  thiamine 100 milliGRAM(s) Oral daily    MEDICATIONS  (PRN):  acetaminophen    Suspension. 650 milliGRAM(s) Oral every 6 hours PRN Moderate Pain (4 - 6)  dextrose Gel 1 Dose(s) Oral once PRN Blood Glucose LESS THAN 70 milliGRAM(s)/deciliter  glucagon  Injectable 1 milliGRAM(s) IntraMuscular once PRN Glucose LESS THAN 70 milligrams/deciliter  LORazepam   Injectable 2 milliGRAM(s) IV Push every 4 hours PRN Anxiety  sodium chloride 0.9% lock flush 10 milliLiter(s) IV Push every 1 hour PRN After each medication administration  sodium chloride 0.9% lock flush 10 milliLiter(s) IV Push every 12 hours PRN Lumen of catheter NOT used      Labs:                                            7.2    3.8   )-----------( 32       ( 2018 04:37 )             24.4     04-02    135  |  97  |  49<H>  ----------------------------<  206<H>  4.9   |  25  |  3.15<H>    Ca    8.2<L>      2018 04:37  Phos  2.7       Mg     2.0         TPro  5.6<L>  /  Alb  3.0<L>  /  TBili  0.9  /  DBili  x   /  AST  175<H>  /  ALT  172<H>  /  AlkPhos  342<H>      IMAGING:       < from: Xray Chest 1 View- PORTABLE-Routine (18 @ 06:17) >    EXAM:  XR CHEST PORTABLE ROUTINE 1V                          PROCEDURE DATE:  2018    INTERPRETATION:  Clinical History: Intubation    Portable examination the chest demonstrates endotracheal tube tip   overlying thoracic inlet. No interval change position remaining support   devices in comparison to prior examination of the chest 3/31/2018.   Congestion and/or infiltrates. Bilateral effusions.    Impression: Mild congestion and/or infiltrates. Bilateral effusions. Tip   of endotracheal tube overlying thoracic inlet    < end of copied text >        < from: US Abdomen Limited (18 @ 15:21) >  EXAM:  US ABDOMEN LIMITED                          PROCEDURE DATE:  2018    INTERPRETATION:  ABDOMINAL ULTRASOUND - LIMITED dated 3/30/2018 3:21 PM    INDICATION: Elevated liver function tests. Assess for cholestasis.    PRIOR STUDIES: Abdominal ultrasound 3/12/2018. CT abdomen and pelvis   3/10/2018.    FINDINGS:   Liver: Normal echogenicity. Normal size. No focal lesions.    Intrahepatic ducts: Not dilated.    Common bile duct: Normal diameter, measuring 0.6 cm.    Gallbladder:  There is a large amount of layering shadowing material   within the gallbladder lumen consistent with small stones and crystals.   No gallbladder wall thickening. No gallbladder distention.    Pancreas: The visualized portions are normal in appearance.      Abdominal aorta: The visualized portions are normal in appearance.    Inferior vena cava: The visualized portions are normal in appearance.    Right kidney: Length of 11.4 cm. Increased cortical echogenicity. No   focal lesions.     Also noted: Small amount of ascites. The main portal vein is patent with   hepatopetal flow. Right pleural effusion.    IMPRESSION:  1.  Cholelithiasis. No biliary ductal dilatation.   2.  Ascites and right pleural effusion.    < end of copied text >        PEx:  ICU Vital Signs Last 24 Hrs  T(C): 37.2 (2018 11:20), Max: 37.9 (2018 17:36)  T(F): 98.9 (2018 11:20), Max: 100.2 (2018 17:36)  HR: 80 (2018 13:00) (72 - 94)  BP: 76/69 (:) (75/67 - 85/72)  BP(mean): 74 (:) (66 - 77)  ABP: 86/52 (:) (84/52 - 98/68)  ABP(mean): 62 (:) (62 - 78)  RR: 19 (2018 13:) (10 - 25)  SpO2: 100% (2018 13:00) (100% - 100%)        General:  ill appearing, thin, not distressed   HEENT:  nc/at, thin,  temporal wasting, moist oral cavity, orally intubated, + dobhoff tube orally placed, eyes open briefly, no eye contact  Neck:   supple, neg lymphadenopathy,  L IJ TLC  CVS:  SR,  rate controlled, pacer noted to  L chest wall, + R HD catheter, distant heart tones  Resp:  non-labored, vented  GI:   large, distended slightly, soft, + BS nontender, dependent edema  :   no barnes present, + scrotal edema, anueric  Musc:   weak, thin, no spontaneous movement, weakly attempts to follow commands. r hand with mitten restraint  Ext: crusty, dressed lower L extremity, L arm PICC, R hand restraint  Neuro: off sedation, following some simple commands, + waking to voice- flutters eyes only  Psych: maritza  Skin:  thin, dry, cool, + generalized edema  Lymph:  neg  Preadmit Karnofsky:   50 %           Current Karnofsky:   30    %  Cachexia (Y/N):   yes  BMI:  22    Advanced Directives:     Full Code     HCP noted on chart     DPOA  (for finances)       Decision maker:   pt was deemed not to have decision making capacity on  by psychiatry  Legal surrogate:  pts dgt Cristal Castro [phone] is pts HCP    Social History:   single, lives by self, pt has a HHA 4hrs/day, 3 days per week.  Pt has 2 adult children (Cristal aged 37) and pts son, is 24 and lives in [facility]. 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    He is retired but reports he is not on disability.   Pt is "spiritual" and practices a "Amish" raquel background   Per notes, pt has not been compliant with his medication (not picked up prescription from his outpt pharmacy) since 2017.    GOALS OF CARE DISCUSSION:       Palliative care info/counseling provided-- following	           Family meeting- pts dgt//HCP Cristal will be present at 10:30a on Tuesday April 3       Advanced Directives addressed please see Advance Care Planning Note-- 	           Documentation of GOC:  [facility] to [facility]/u on plan re: trach and PEG          REFERRALS:	        Palliative Med        Unit SW/Case Mgmt       - referred       Massage Therapy-- referred       Music Therapy-- referred       Nutrition-- following

## 2018-04-02 NOTE — PROGRESS NOTE ADULT - SUBJECTIVE AND OBJECTIVE BOX
INTERVAL HPI/OVERNIGHT EVENTS: Started on Precedex overnight. Hb drop to 7.2.    SUBJECTIVE: Patient seen and examined at bedside. Does not respond to painful or verbal stimuli.     OBJECTIVE:    VITAL SIGNS:  ICU Vital Signs Last 24 Hrs  T(C): 37.6 (02 Apr 2018 06:51), Max: 37.9 (01 Apr 2018 17:36)  T(F): 99.7 (02 Apr 2018 06:51), Max: 100.2 (01 Apr 2018 17:36)  HR: 74 (02 Apr 2018 06:22) (74 - 94)  BP: 75/67 (02 Apr 2018 06:00) (75/67 - 85/72)  BP(mean): 72 (02 Apr 2018 06:00) (71 - 77)  ABP: 86/58 (02 Apr 2018 06:00) (84/52 - 106/72)  ABP(mean): 68 (02 Apr 2018 06:00) (62 - 84)  RR: 16 (02 Apr 2018 06:22) (10 - 21)  SpO2: 100% (02 Apr 2018 06:22) (95% - 100%)    Mode: CPAP with PS, FiO2: 40, PEEP: 5, PS: 8, ITime: 0.9, MAP: 7.5, PIP: 13    03-31 @ 07:01 - 04-01 @ 07:00  --------------------------------------------------------  IN: 1661 mL / OUT: 2000 mL / NET: -339 mL    04-01 @ 07:01  -  04-02 @ 06:55  --------------------------------------------------------  IN: 2067.8 mL / OUT: 0 mL / NET: 2067.8 mL      CAPILLARY BLOOD GLUCOSE      POCT Blood Glucose.: 193 mg/dL (02 Apr 2018 05:22)      PHYSICAL EXAM:    General: Intubated and sedated   HEENT: NC/AT; no tracking. 4mm, sluggish reaction to light   Neck: supple, R HD cath,   Respiratory: Decreased breath sounds R base   Cardiovascular: +S1/S2; RRR, ii/vi systolic murmur   Abdomen: soft, distended; +BS x4  : Decreased testicular edema   Extremities:  1+ LE edema. b/l venous stasis changes. warm to touch   Vascular: WWP, 2+ peripheral pulses b/l;  Skin: normal color and turgor; no rash  Neuro: Does not open eyes. Does not respond to painful stimuli. does not follow commands         MEDICATIONS:  MEDICATIONS  (STANDING):  chlorhexidine 0.12% Liquid 15 milliLiter(s) Swish and Spit two times a day  chlorhexidine 2% Cloths 1 Application(s) Topical daily  dexmedetomidine Infusion 0.2 MICROgram(s)/kG/Hr (4 mL/Hr) IV Continuous <Continuous>  dextrose 5%. 1000 milliLiter(s) (50 mL/Hr) IV Continuous <Continuous>  dextrose 50% Injectable 12.5 Gram(s) IV Push once  dextrose 50% Injectable 25 Gram(s) IV Push once  dextrose 50% Injectable 25 Gram(s) IV Push once  ergocalciferol Drops 6000 Unit(s) Oral daily  escitalopram 5 milliGRAM(s) Oral daily  folic acid 1 milliGRAM(s) Oral daily  hydrocortisone sodium succinate Injectable 25 milliGRAM(s) IV Push every 12 hours  insulin glargine Injectable (LANTUS) 9 Unit(s) SubCutaneous at bedtime  insulin lispro (HumaLOG) corrective regimen sliding scale   SubCutaneous every 6 hours  metoclopramide Injectable 5 milliGRAM(s) IV Push every 8 hours  metoprolol tartrate 12.5 milliGRAM(s) Oral every 12 hours  midodrine 5 milliGRAM(s) Oral <User Schedule>  modafinil 100 milliGRAM(s) Oral <User Schedule>  multivitamin 1 Tablet(s) Oral daily  norepinephrine Infusion 0.01 MICROgram(s)/kG/Min (1.5 mL/Hr) IV Continuous <Continuous>  pantoprazole   Suspension 40 milliGRAM(s) Oral daily  sodium chloride 0.9% lock flush 20 milliLiter(s) IV Push once  thiamine 100 milliGRAM(s) Oral daily    MEDICATIONS  (PRN):  acetaminophen    Suspension. 650 milliGRAM(s) Oral every 6 hours PRN Moderate Pain (4 - 6)  dextrose Gel 1 Dose(s) Oral once PRN Blood Glucose LESS THAN 70 milliGRAM(s)/deciliter  glucagon  Injectable 1 milliGRAM(s) IntraMuscular once PRN Glucose LESS THAN 70 milligrams/deciliter  LORazepam   Injectable 2 milliGRAM(s) IntraMuscular every 4 hours PRN Anxiety  sodium chloride 0.9% lock flush 10 milliLiter(s) IV Push every 1 hour PRN After each medication administration  sodium chloride 0.9% lock flush 10 milliLiter(s) IV Push every 12 hours PRN Lumen of catheter NOT used      ALLERGIES:  Allergies    No Known Allergies    Intolerances        LABS:                        7.2    3.8   )-----------( 32       ( 02 Apr 2018 04:37 )             24.4     04-02    135  |  97  |  49<H>  ----------------------------<  206<H>  4.9   |  25  |  3.15<H>    Ca    8.2<L>      02 Apr 2018 04:37  Phos  2.7     04-02  Mg     2.0     04-02    TPro  5.6<L>  /  Alb  3.0<L>  /  TBili  0.9  /  DBili  x   /  AST  175<H>  /  ALT  172<H>  /  AlkPhos  342<H>  04-02    PT/INR - ( 02 Apr 2018 04:37 )   PT: 22.8 sec;   INR: 2.02                RADIOLOGY & ADDITIONAL TESTS: Reviewed. INTERVAL HPI/OVERNIGHT EVENTS: Started on Precedex overnight. Hb drop to 7.2.    SUBJECTIVE: Patient seen and examined at bedside. Does not respond to painful or verbal stimuli.     OBJECTIVE:    VITAL SIGNS:  ICU Vital Signs Last 24 Hrs  T(C): 37.6 (02 Apr 2018 06:51), Max: 37.9 (01 Apr 2018 17:36)  T(F): 99.7 (02 Apr 2018 06:51), Max: 100.2 (01 Apr 2018 17:36)  HR: 74 (02 Apr 2018 06:22) (74 - 94)  BP: 75/67 (02 Apr 2018 06:00) (75/67 - 85/72)  BP(mean): 72 (02 Apr 2018 06:00) (71 - 77)  ABP: 86/58 (02 Apr 2018 06:00) (84/52 - 106/72)  ABP(mean): 68 (02 Apr 2018 06:00) (62 - 84)  RR: 16 (02 Apr 2018 06:22) (10 - 21)  SpO2: 100% (02 Apr 2018 06:22) (95% - 100%)    Mode: CPAP with PS, FiO2: 40, PEEP: 5, PS: 8, ITime: 0.9, MAP: 7.5, PIP: 13    03-31 @ 07:01 - 04-01 @ 07:00  --------------------------------------------------------  IN: 1661 mL / OUT: 2000 mL / NET: -339 mL    04-01 @ 07:01  -  04-02 @ 06:55  --------------------------------------------------------  IN: 2067.8 mL / OUT: 0 mL / NET: 2067.8 mL      CAPILLARY BLOOD GLUCOSE      POCT Blood Glucose.: 193 mg/dL (02 Apr 2018 05:22)      PHYSICAL EXAM:    General: Intubated and sedated   HEENT: NC/AT; no tracking. 4mm, sluggish reaction to light   Neck: supple, R HD cath,   Respiratory: Decreased breath sounds R base   Cardiovascular: +S1/S2; RRR, ii/vi systolic murmur   Abdomen: soft, distended; +BS x4  : Decreased testicular edema   Extremities:  1+ LE edema. b/l venous stasis changes. warm to touch   Vascular: WWP, 2+ peripheral pulses b/l;  Skin: normal color and turgor; no rash  Neuro: Does not open eyes. Does not respond to painful stimuli. does not follow commands         MEDICATIONS:  MEDICATIONS  (STANDING):  chlorhexidine 0.12% Liquid 15 milliLiter(s) Swish and Spit two times a day  chlorhexidine 2% Cloths 1 Application(s) Topical daily  dexmedetomidine Infusion 0.2 MICROgram(s)/kG/Hr (4 mL/Hr) IV Continuous <Continuous>  dextrose 5%. 1000 milliLiter(s) (50 mL/Hr) IV Continuous <Continuous>  dextrose 50% Injectable 12.5 Gram(s) IV Push once  dextrose 50% Injectable 25 Gram(s) IV Push once  dextrose 50% Injectable 25 Gram(s) IV Push once  ergocalciferol Drops 6000 Unit(s) Oral daily  escitalopram 5 milliGRAM(s) Oral daily  folic acid 1 milliGRAM(s) Oral daily  hydrocortisone sodium succinate Injectable 25 milliGRAM(s) IV Push every 12 hours  insulin glargine Injectable (LANTUS) 9 Unit(s) SubCutaneous at bedtime  insulin lispro (HumaLOG) corrective regimen sliding scale   SubCutaneous every 6 hours  metoclopramide Injectable 5 milliGRAM(s) IV Push every 8 hours  metoprolol tartrate 12.5 milliGRAM(s) Oral every 12 hours  midodrine 5 milliGRAM(s) Oral <User Schedule>  modafinil 100 milliGRAM(s) Oral <User Schedule>  multivitamin 1 Tablet(s) Oral daily  norepinephrine Infusion 0.01 MICROgram(s)/kG/Min (1.5 mL/Hr) IV Continuous <Continuous>  pantoprazole   Suspension 40 milliGRAM(s) Oral daily  sodium chloride 0.9% lock flush 20 milliLiter(s) IV Push once  thiamine 100 milliGRAM(s) Oral daily    MEDICATIONS  (PRN):  acetaminophen    Suspension. 650 milliGRAM(s) Oral every 6 hours PRN Moderate Pain (4 - 6)  dextrose Gel 1 Dose(s) Oral once PRN Blood Glucose LESS THAN 70 milliGRAM(s)/deciliter  glucagon  Injectable 1 milliGRAM(s) IntraMuscular once PRN Glucose LESS THAN 70 milligrams/deciliter  LORazepam   Injectable 2 milliGRAM(s) IntraMuscular every 4 hours PRN Anxiety  sodium chloride 0.9% lock flush 10 milliLiter(s) IV Push every 1 hour PRN After each medication administration  sodium chloride 0.9% lock flush 10 milliLiter(s) IV Push every 12 hours PRN Lumen of catheter NOT used      ALLERGIES:  Allergies    No Known Allergies    Intolerances        LABS:                        7.2    3.8   )-----------( 32       ( 02 Apr 2018 04:37 )             24.4     04-02    135  |  97  |  49<H>  ----------------------------<  206<H>  4.9   |  25  |  3.15<H>    Ca    8.2<L>      02 Apr 2018 04:37  Phos  2.7     04-02  Mg     2.0     04-02    TPro  5.6<L>  /  Alb  3.0<L>  /  TBili  0.9  /  DBili  x   /  AST  175<H>  /  ALT  172<H>  /  AlkPhos  342<H>  04-02    PT/INR - ( 02 Apr 2018 04:37 )   PT: 22.8 sec;   INR: 2.02                RADIOLOGY & ADDITIONAL TESTS: Reviewed. CXR with bibasliar infiltrates, left effusion

## 2018-04-03 LAB
ALBUMIN SERPL ELPH-MCNC: 3.6 G/DL — SIGNIFICANT CHANGE UP (ref 3.3–5)
ALP SERPL-CCNC: 316 U/L — HIGH (ref 40–120)
ALT FLD-CCNC: 156 U/L — HIGH (ref 10–45)
ANION GAP SERPL CALC-SCNC: 12 MMOL/L — SIGNIFICANT CHANGE UP (ref 5–17)
ANISOCYTOSIS BLD QL: SIGNIFICANT CHANGE UP
AST SERPL-CCNC: 135 U/L — HIGH (ref 10–40)
BASO STIPL BLD QL SMEAR: SLIGHT — SIGNIFICANT CHANGE UP
BILIRUB SERPL-MCNC: 1 MG/DL — SIGNIFICANT CHANGE UP (ref 0.2–1.2)
BUN SERPL-MCNC: 50 MG/DL — HIGH (ref 7–23)
CALCIUM SERPL-MCNC: 8.7 MG/DL — SIGNIFICANT CHANGE UP (ref 8.4–10.5)
CHLORIDE SERPL-SCNC: 97 MMOL/L — SIGNIFICANT CHANGE UP (ref 96–108)
CO2 SERPL-SCNC: 26 MMOL/L — SIGNIFICANT CHANGE UP (ref 22–31)
CREAT SERPL-MCNC: 3.17 MG/DL — HIGH (ref 0.5–1.3)
D DIMER BLD IA.RAPID-MCNC: 500 NG/ML DDU — HIGH
DACRYOCYTES BLD QL SMEAR: SLIGHT — SIGNIFICANT CHANGE UP
FIBRINOGEN PPP-MCNC: 314 MG/DL — SIGNIFICANT CHANGE UP (ref 258–438)
GLUCOSE BLDC GLUCOMTR-MCNC: 129 MG/DL — HIGH (ref 70–99)
GLUCOSE BLDC GLUCOMTR-MCNC: 149 MG/DL — HIGH (ref 70–99)
GLUCOSE BLDC GLUCOMTR-MCNC: 175 MG/DL — HIGH (ref 70–99)
GLUCOSE BLDC GLUCOMTR-MCNC: 241 MG/DL — HIGH (ref 70–99)
GLUCOSE BLDC GLUCOMTR-MCNC: 243 MG/DL — HIGH (ref 70–99)
GLUCOSE SERPL-MCNC: 123 MG/DL — HIGH (ref 70–99)
HCT VFR BLD CALC: 24.4 % — LOW (ref 39–50)
HCT VFR BLD CALC: 25.1 % — LOW (ref 39–50)
HGB BLD-MCNC: 7.5 G/DL — LOW (ref 13–17)
HGB BLD-MCNC: 7.6 G/DL — LOW (ref 13–17)
HYPOCHROMIA BLD QL: SLIGHT — SIGNIFICANT CHANGE UP
HYPOSEGMENTATION: PRESENT — SIGNIFICANT CHANGE UP
INR BLD: 1.68 — HIGH (ref 0.88–1.16)
LG PLATELETS BLD QL AUTO: PRESENT — SIGNIFICANT CHANGE UP
LYMPHOCYTES # BLD AUTO: 14 % — SIGNIFICANT CHANGE UP (ref 13–44)
MACROCYTES BLD QL: SLIGHT — SIGNIFICANT CHANGE UP
MAGNESIUM SERPL-MCNC: 2 MG/DL — SIGNIFICANT CHANGE UP (ref 1.6–2.6)
MANUAL SMEAR VERIFICATION: SIGNIFICANT CHANGE UP
MCHC RBC-ENTMCNC: 30.3 G/DL — LOW (ref 32–36)
MCHC RBC-ENTMCNC: 30.7 G/DL — LOW (ref 32–36)
MCHC RBC-ENTMCNC: 31.3 PG — SIGNIFICANT CHANGE UP (ref 27–34)
MCHC RBC-ENTMCNC: 31.9 PG — SIGNIFICANT CHANGE UP (ref 27–34)
MCV RBC AUTO: 103.3 FL — HIGH (ref 80–100)
MCV RBC AUTO: 103.8 FL — HIGH (ref 80–100)
MICROCYTES BLD QL: SLIGHT — SIGNIFICANT CHANGE UP
MONOCYTES NFR BLD AUTO: 4 % — SIGNIFICANT CHANGE UP (ref 2–14)
NEUTROPHILS NFR BLD AUTO: 65 % — SIGNIFICANT CHANGE UP (ref 43–77)
NEUTS BAND # BLD: 17 % — HIGH
NEUTS VAC BLD QL SMEAR: PRESENT — SIGNIFICANT CHANGE UP
OVALOCYTES BLD QL SMEAR: SLIGHT — SIGNIFICANT CHANGE UP
PHOSPHATE SERPL-MCNC: 2.3 MG/DL — LOW (ref 2.5–4.5)
PLAT MORPH BLD: (no result)
PLATELET # BLD AUTO: 29 K/UL — LOW (ref 150–400)
PLATELET # BLD AUTO: 30 K/UL — LOW (ref 150–400)
POIKILOCYTOSIS BLD QL AUTO: SLIGHT — SIGNIFICANT CHANGE UP
POLYCHROMASIA BLD QL SMEAR: SLIGHT — SIGNIFICANT CHANGE UP
POTASSIUM SERPL-MCNC: 5.6 MMOL/L — HIGH (ref 3.5–5.3)
POTASSIUM SERPL-SCNC: 5.6 MMOL/L — HIGH (ref 3.5–5.3)
PROT SERPL-MCNC: 6.6 G/DL — SIGNIFICANT CHANGE UP (ref 6–8.3)
PROTHROM AB SERPL-ACNC: 18.9 SEC — HIGH (ref 9.8–12.7)
RBC # BLD: 2.35 M/UL — LOW (ref 4.2–5.8)
RBC # BLD: 2.43 M/UL — LOW (ref 4.2–5.8)
RBC # FLD: 18.5 % — HIGH (ref 10.3–16.9)
RBC # FLD: 18.5 % — HIGH (ref 10.3–16.9)
RBC BLD AUTO: (no result)
SODIUM SERPL-SCNC: 135 MMOL/L — SIGNIFICANT CHANGE UP (ref 135–145)
TOXIC GRANULES BLD QL SMEAR: SLIGHT — SIGNIFICANT CHANGE UP
WBC # BLD: 4.2 K/UL — SIGNIFICANT CHANGE UP (ref 3.8–10.5)
WBC # BLD: 4.3 K/UL — SIGNIFICANT CHANGE UP (ref 3.8–10.5)
WBC # FLD AUTO: 4.2 K/UL — SIGNIFICANT CHANGE UP (ref 3.8–10.5)
WBC # FLD AUTO: 4.3 K/UL — SIGNIFICANT CHANGE UP (ref 3.8–10.5)

## 2018-04-03 PROCEDURE — 71045 X-RAY EXAM CHEST 1 VIEW: CPT | Mod: 26

## 2018-04-03 PROCEDURE — 99233 SBSQ HOSP IP/OBS HIGH 50: CPT

## 2018-04-03 PROCEDURE — 99291 CRITICAL CARE FIRST HOUR: CPT

## 2018-04-03 RX ORDER — ALBUMIN HUMAN 25 %
50 VIAL (ML) INTRAVENOUS
Qty: 0 | Refills: 0 | Status: DISCONTINUED | OUTPATIENT
Start: 2018-04-03 | End: 2018-04-04

## 2018-04-03 RX ORDER — MODAFINIL 200 MG/1
100 TABLET ORAL
Qty: 0 | Refills: 0 | Status: DISCONTINUED | OUTPATIENT
Start: 2018-04-04 | End: 2018-04-10

## 2018-04-03 RX ORDER — MIDODRINE HYDROCHLORIDE 2.5 MG/1
12.5 TABLET ORAL EVERY 8 HOURS
Qty: 0 | Refills: 0 | Status: DISCONTINUED | OUTPATIENT
Start: 2018-04-03 | End: 2018-04-06

## 2018-04-03 RX ADMIN — INSULIN GLARGINE 12 UNIT(S): 100 INJECTION, SOLUTION SUBCUTANEOUS at 23:01

## 2018-04-03 RX ADMIN — Medication 5 MILLIGRAM(S): at 07:02

## 2018-04-03 RX ADMIN — CHLORHEXIDINE GLUCONATE 15 MILLILITER(S): 213 SOLUTION TOPICAL at 10:22

## 2018-04-03 RX ADMIN — Medication 1 MILLIGRAM(S): at 10:22

## 2018-04-03 RX ADMIN — CHLORHEXIDINE GLUCONATE 15 MILLILITER(S): 213 SOLUTION TOPICAL at 22:46

## 2018-04-03 RX ADMIN — Medication 201 MILLIGRAM(S): at 15:07

## 2018-04-03 RX ADMIN — Medication 50 MILLILITER(S): at 16:42

## 2018-04-03 RX ADMIN — CHLORHEXIDINE GLUCONATE 1 APPLICATION(S): 213 SOLUTION TOPICAL at 07:03

## 2018-04-03 RX ADMIN — Medication 4: at 23:01

## 2018-04-03 RX ADMIN — MIDODRINE HYDROCHLORIDE 10 MILLIGRAM(S): 2.5 TABLET ORAL at 05:14

## 2018-04-03 RX ADMIN — Medication 650 MILLIGRAM(S): at 12:05

## 2018-04-03 RX ADMIN — Medication 1 TABLET(S): at 10:22

## 2018-04-03 RX ADMIN — Medication 25 MILLIGRAM(S): at 18:45

## 2018-04-03 RX ADMIN — ESCITALOPRAM OXALATE 5 MILLIGRAM(S): 10 TABLET, FILM COATED ORAL at 10:22

## 2018-04-03 RX ADMIN — Medication 5 MILLIGRAM(S): at 22:40

## 2018-04-03 RX ADMIN — Medication 5 MILLIGRAM(S): at 14:19

## 2018-04-03 RX ADMIN — Medication 25 MILLIGRAM(S): at 07:02

## 2018-04-03 RX ADMIN — MODAFINIL 100 MILLIGRAM(S): 200 TABLET ORAL at 12:06

## 2018-04-03 RX ADMIN — MODAFINIL 100 MILLIGRAM(S): 200 TABLET ORAL at 07:02

## 2018-04-03 RX ADMIN — PANTOPRAZOLE SODIUM 40 MILLIGRAM(S): 20 TABLET, DELAYED RELEASE ORAL at 10:22

## 2018-04-03 RX ADMIN — ERGOCALCIFEROL 6000 UNIT(S): 1.25 CAPSULE ORAL at 10:23

## 2018-04-03 RX ADMIN — MIDODRINE HYDROCHLORIDE 10 MILLIGRAM(S): 2.5 TABLET ORAL at 10:22

## 2018-04-03 RX ADMIN — Medication 2: at 12:06

## 2018-04-03 RX ADMIN — MIDODRINE HYDROCHLORIDE 12.5 MILLIGRAM(S): 2.5 TABLET ORAL at 18:46

## 2018-04-03 RX ADMIN — Medication 650 MILLIGRAM(S): at 12:32

## 2018-04-03 RX ADMIN — Medication 100 MILLIGRAM(S): at 10:22

## 2018-04-03 RX ADMIN — Medication 50 MILLILITER(S): at 17:48

## 2018-04-03 NOTE — PROGRESS NOTE ADULT - PROBLEM SELECTOR PLAN 2
Acute on chronic systolic CHF with LVEF 15 % and severely low blood pressure  Please concentrate as many IV fluid drips as possible   Will aim to keep patient in a net negative balance

## 2018-04-03 NOTE — CHART NOTE - NSCHARTNOTEFT_GEN_A_CORE
Admitting Diagnosis:   63M PMH HFrEF 10-15% (ischemic), MI, s/p AICD vs PPM, possible Afib, HTN, DM2 on insulin, possible CKD, and gout, who presents with a chief complaint of generalized weakness s/p septic shock likely 2/2 LE cellulitis. Now with AMS and new leukocytisis likely 2/2 UTI     PAST MEDICAL & SURGICAL HISTORY:  Type 2 diabetes mellitus with diabetic peripheral angiopathy without gangrene, with long-term current  Essential hypertension, benign  Gout  Pacemaker  Chronic systolic heart failure  Myocardial infarction  No significant past surgical history      Current Nutrition Order:  Glucerna 1.5 Terrell @ 67mL/hr x 24hrs via OG tube to provide 1608 mL TV, 2412 kcal, 133g protein, 1220 mL free H2O, 1.49 g/kg IBW protein      PO Intake: Good (%) [   ]  Fair (50-75%) [   ] Poor (<25%) [   ]- N/A NPO    GI Issues: WDL, no diarrhea/intolerance noted per RN    Pain: Unable to assess at this time 2/2 vent     Skin Integrity: L. buttock pressure ulcer     Labs:       135  |  97  |  50<H>  ----------------------------<  123<H>  5.6<H>   |  26  |  3.17<H>    Ca    8.7      2018 06:15  Phos  2.3     04-03  Mg     2.0     04-03    TPro  6.6  /  Alb  3.6  /  TBili  1.0  /  DBili  x   /  AST  135<H>  /  ALT  156<H>  /  AlkPhos  316<H>  -    CAPILLARY BLOOD GLUCOSE      POCT Blood Glucose.: 175 mg/dL (2018 11:52)  POCT Blood Glucose.: 129 mg/dL (2018 06:47)  POCT Blood Glucose.: 204 mg/dL (2018 22:24)  POCT Blood Glucose.: 175 mg/dL (2018 17:34)      Medications:  MEDICATIONS  (STANDING):  chlorhexidine 0.12% Liquid 15 milliLiter(s) Swish and Spit two times a day  chlorhexidine 2% Cloths 1 Application(s) Topical daily  dextrose 5%. 1000 milliLiter(s) (50 mL/Hr) IV Continuous <Continuous>  dextrose 50% Injectable 12.5 Gram(s) IV Push once  dextrose 50% Injectable 25 Gram(s) IV Push once  dextrose 50% Injectable 25 Gram(s) IV Push once  ergocalciferol Drops 6000 Unit(s) Oral daily  escitalopram 5 milliGRAM(s) Oral daily  folic acid 1 milliGRAM(s) Oral daily  hydrocortisone sodium succinate Injectable 25 milliGRAM(s) IV Push every 12 hours  insulin glargine Injectable (LANTUS) 12 Unit(s) SubCutaneous at bedtime  insulin lispro (HumaLOG) corrective regimen sliding scale   SubCutaneous every 6 hours  metoclopramide Injectable 5 milliGRAM(s) IV Push every 8 hours  midodrine 12.5 milliGRAM(s) Oral every 8 hours  multivitamin 1 Tablet(s) Oral daily  norepinephrine Infusion 0.01 MICROgram(s)/kG/Min (1.5 mL/Hr) IV Continuous <Continuous>  pantoprazole   Suspension 40 milliGRAM(s) Oral daily  sodium chloride 0.9% lock flush 20 milliLiter(s) IV Push once  thiamine 100 milliGRAM(s) Oral daily    MEDICATIONS  (PRN):  acetaminophen    Suspension. 650 milliGRAM(s) Oral every 6 hours PRN Moderate Pain (4 - 6)  dextrose Gel 1 Dose(s) Oral once PRN Blood Glucose LESS THAN 70 milliGRAM(s)/deciliter  glucagon  Injectable 1 milliGRAM(s) IntraMuscular once PRN Glucose LESS THAN 70 milligrams/deciliter  LORazepam   Injectable 2 milliGRAM(s) IV Push every 4 hours PRN Anxiety  sodium chloride 0.9% lock flush 10 milliLiter(s) IV Push every 1 hour PRN After each medication administration  sodium chloride 0.9% lock flush 10 milliLiter(s) IV Push every 12 hours PRN Lumen of catheter NOT used      Weight:  Daily     Daily Weight in k (2018 14:25)    Weight:  3/16 93.4 kg  3/17 85.4 kg  3/19 85.9 kg  3/20 90.1 kg    Weight Change: Weight fluctuating throughout admission 2/2 HD    Estimated energy needs using 89 kg IBW: Needs estimated 2/2 vent, CVVH  Calories: 25-30 kcal/kg = 2032-6630 kcal/day  Protein: 1.4-1.6 g/kg = 125-142 g protein/day  Fluids: 1000 mL + UOP 2/2 HD    Subjective: Pt intubated on CPAP trial, off of pressor support, MAP 67. CVVHD has stopped. Pt seen in room, intubated, not alert at this time, per RN has been very lethargic. TF running at goal rate with good tolerance. +Flatus, +BM. Palliative following closely- per MD notes, decision made at family meeting this AM to pursue trach and PEG placements to prolong life. Will continue to keep nutrition in line with goals of care.     Previous Nutrition Diagnosis: Inadequate energy intake RT inability to take PO AEB NPO/intubation    Active [  ]  Resolved [ X  ]    New PES statement: increased protein-calorie needs RT increased demand for protein-calorie intake AEB intubated on vent support    Goal: Meet % of nutrition needs via tolerated route.     Recommendations:  1. Continue with current TF order; monitor for s/s intolerance   2. Trend daily weights  3. Keep nutrition in line with goals of care at all times     Education: N/A-vent    Risk Level: High [ X  ] Moderate [   ] Low [   ]

## 2018-04-03 NOTE — PROGRESS NOTE ADULT - PROBLEM SELECTOR PLAN 7
Support provided to patient and family. Patient to have access to supportive services during rest of hospital stay as the patient/family deemed necessary ie. Chaplaincy, Massage therapy, Music therapy, Patient and family supportive services, Palliative SW, etc.  As discussed during the palliative IDT meeting and as identified during the patients PSSA screening the patient would benefit from: massage therapy, music  and chapaincy

## 2018-04-03 NOTE — PROGRESS NOTE ADULT - SUBJECTIVE AND OBJECTIVE BOX
HUNTER BRIZUELA   MRN-[id number]     (1955):     HPI:  64 yo M history of HFrEF 10-15% 2/2 ischemic cardiomyopathy, MI , s/p AICD vs PPM, ?Afib, Hypertension, Diabetes Mellitus Type 2 on insulin, CKD?and gout, who presents with a chief complaint of generalized weakness. Pt wad admitted to [facility] 2 months ago for gout and was sent to rehab. He was discharged home mid Feb with VNS. In the past 2 weeks patient has noted increased leg pain and weakness bilaterally. In the last few days, he has also experienced generalized weakness prompting him to come to ER.   In ER, noted to have t max of 102, SBP 70s, leukocytosis to 32.8, Lactate of 6.6, Acute renal failure with severe metabolic derangements. Given 3.5L fluids and Vanc/Zosyn. MICU consulted. (10 Mar 2018 18:51)    Pt remains intubated  He is off sedation and has a poor mental status, al be it slightly improved.    Pt is receiving and is more engaging, al be it briefly. Pt will follow some simple commands.  Pt now on intermittent HD, back on levophed, intubated x 11 days.     ROS:   nonverbal as pt is intubated.   Unable to attain due to:  oral intubation                     Dyspnea (Heriberto 0-10):                       N/V (Y/N):                            Secretions (Y/N):              Agitation(Y/N):  Pain (Y/N):       -Provocation/Palliation:  -Quality/Quantity:  -Radiating:  -Severity:  -Timing/Frequency:  -Impact on ADLs:    Allergies:  No Known Allergies    Intolerances    Opiate Naive (Y/N):   yes  -iStop reviewed (Y/N):  yes ref # [id number]    MEDICATIONS  (STANDING):  chlorhexidine 0.12% Liquid 15 milliLiter(s) Swish and Spit two times a day  chlorhexidine 2% Cloths 1 Application(s) Topical daily  dextrose 5%. 1000 milliLiter(s) (50 mL/Hr) IV Continuous <Continuous>  dextrose 50% Injectable 12.5 Gram(s) IV Push once  dextrose 50% Injectable 25 Gram(s) IV Push once  dextrose 50% Injectable 25 Gram(s) IV Push once  ergocalciferol Drops 6000 Unit(s) Oral daily  escitalopram 5 milliGRAM(s) Oral daily  folic acid 1 milliGRAM(s) Oral daily  hydrocortisone sodium succinate Injectable 25 milliGRAM(s) IV Push every 12 hours  insulin glargine Injectable (LANTUS) 12 Unit(s) SubCutaneous at bedtime  insulin lispro (HumaLOG) corrective regimen sliding scale   SubCutaneous every 6 hours  metoclopramide Injectable 5 milliGRAM(s) IV Push every 8 hours  midodrine 12.5 milliGRAM(s) Oral every 8 hours  modafinil 100 milliGRAM(s) Oral <User Schedule>  multivitamin 1 Tablet(s) Oral daily  norepinephrine Infusion 0.01 MICROgram(s)/kG/Min (1.5 mL/Hr) IV Continuous <Continuous>  pantoprazole   Suspension 40 milliGRAM(s) Oral daily  sodium chloride 0.9% lock flush 20 milliLiter(s) IV Push once  thiamine 100 milliGRAM(s) Oral daily    MEDICATIONS  (PRN):  acetaminophen    Suspension. 650 milliGRAM(s) Oral every 6 hours PRN Moderate Pain (4 - 6)  dextrose Gel 1 Dose(s) Oral once PRN Blood Glucose LESS THAN 70 milliGRAM(s)/deciliter  glucagon  Injectable 1 milliGRAM(s) IntraMuscular once PRN Glucose LESS THAN 70 milligrams/deciliter  LORazepam   Injectable 2 milliGRAM(s) IV Push every 4 hours PRN Anxiety  sodium chloride 0.9% lock flush 10 milliLiter(s) IV Push every 1 hour PRN After each medication administration  sodium chloride 0.9% lock flush 10 milliLiter(s) IV Push every 12 hours PRN Lumen of catheter NOT used    Labs:                              7.6    4.3   )-----------(        ( 2018 06:15 )             25.1     04-03    135  |  97  |  50<H>  ----------------------------<  123<H>  5.6<H>   |  26  |  3.17<H>    Ca    8.7      2018 06:15  Phos  2.3     04-03  Mg     2.0     04-03    TPro  6.6  /  Alb  3.6  /  TBili  1.0  /  DBili  x   /  AST  135<H>  /  ALT  156<H>  /  AlkPhos  316<H>  04-      IMAGING:     < from: Xray Chest 1 View- PORTABLE-Routine (18 @ 06:17) >    EXAM:  XR CHEST PORTABLE ROUTINE 1V                          PROCEDURE DATE:  2018    INTERPRETATION:  Clinical History: Intubation    Portable examination the chest demonstrates endotracheal tube tip   overlying thoracic inlet. No interval change position remaining support   devices in comparison to prior examination of the chest 3/31/2018.   Congestion and/or infiltrates. Bilateral effusions.    Impression: Mild congestion and/or infiltrates. Bilateral effusions. Tip   of endotracheal tube overlying thoracic inlet    < end of copied text >        < from: US Abdomen Limited (18 @ 15:21) >  EXAM:  US ABDOMEN LIMITED                          PROCEDURE DATE:  2018    INTERPRETATION:  ABDOMINAL ULTRASOUND - LIMITED dated 3/30/2018 3:21 PM    INDICATION: Elevated liver function tests. Assess for cholestasis.    PRIOR STUDIES: Abdominal ultrasound 3/12/2018. CT abdomen and pelvis   3/10/2018.    FINDINGS:   Liver: Normal echogenicity. Normal size. No focal lesions.    Intrahepatic ducts: Not dilated.    Common bile duct: Normal diameter, measuring 0.6 cm.    Gallbladder:  There is a large amount of layering shadowing material   within the gallbladder lumen consistent with small stones and crystals.   No gallbladder wall thickening. No gallbladder distention.    Pancreas: The visualized portions are normal in appearance.      Abdominal aorta: The visualized portions are normal in appearance.    Inferior vena cava: The visualized portions are normal in appearance.    Right kidney: Length of 11.4 cm. Increased cortical echogenicity. No   focal lesions.     Also noted: Small amount of ascites. The main portal vein is patent with   hepatopetal flow. Right pleural effusion.    IMPRESSION:  1.  Cholelithiasis. No biliary ductal dilatation.   2.  Ascites and right pleural effusion.    < end of copied text >    PEx:  ICU Vital Signs Last 24 Hrs  T(C): 37.6 (2018 07:00), Max: 37.7 (2018 18:00)  T(F): 99.7 (2018 07:00), Max: 99.9 (2018 18:00)  HR: 88 (2018 11:00) (80 - 104)  BP: 82/62 (2018 09:00) (76/69 - 102/86)  BP(mean): 82 (2018 11:00) (69 - 94)  ABP: 88/60 (2018 11:00) (84/54 - 130/78)  ABP(mean): 70 (2018 11:00) (62 - 102)  RR: 18 (2018 11:00) (9 - 28)  SpO2: 100% (2018 11:00) (98% - 100%)      General:  ill appearing, thin, not distressed   HEENT:  nc/at, thin,  temporal wasting, moist oral cavity, orally intubated, + dobhoff tube orally placed, eyes open briefly, no eye contact  Neck:   supple, neg lymphadenopathy,  L IJ TLC  CVS:  SR,  rate controlled, pacer noted to  L chest wall, + R HD catheter, distant heart tones  Resp:  non-labored, vented  GI:   large, distended slightly, soft, + BS nontender, dependent edema  :   no barnes present, + scrotal edema, anuric  Musc:   weak, thin, no spontaneous movement, weakly attempts to follow commands. r hand with mitten restraint  Ext: crusty, dressed lower L extremity, L arm PICC, R hand restraint  Neuro: off sedation, following minimal  commands, + waking to voice- flutters eyes only  Psych: maritza  Skin:  thin, dry, cool, + generalized edema, pitting edema to the hips bilaterally  Lymph:  neg  Preadmit Karnofsky:   50 %           Current Karnofsky:   30    %  Cachexia (Y/N):   yes  BMI:  22    Advanced Directives:     Full Code     HCP noted on chart     DPOA  (for finances)       Decision maker:   pt was deemed not to have decision making capacity on  by psychiatry  Legal surrogate:  pts dgt Cristal Castro [phone] is pts HCP    Social History:   single, lives by self, pt has a HHA 4hrs/day, 3 days per week.  Pt has 2 adult children (Cristal aged 37) and pts son, is 24 and lives in [facility]. 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    He is retired but reports he is not on disability.   Pt is "spiritual" and practices a "Gnosticist" raquel background   Per notes, pt has not been compliant with his medication (not picked up prescription from his outpt pharmacy) since 2017.    GOALS OF CARE DISCUSSION:       Palliative care info/counseling provided-- following	           Family meeting-  today, see below for details       Advanced Directives addressed please see Advance Care Planning Note-- 	           Documentation of GOC:  to allow more time for a neurologic recovery, HCP authorized trach and PEG to support pt          REFERRALS:	        Palliative Med        Unit SW/Case Mgmt       - referred       Massage Therapy-- referred       Music Therapy-- referred       Nutrition-- following

## 2018-04-03 NOTE — PROGRESS NOTE ADULT - PROBLEM SELECTOR PLAN 1
Patient is a 63  year old male with acute on chronic renal failure from ischemic ATN. Patient is currently requiring hemodialysis. Patient was last dialyzed 4/2 with UF of 2.5L     P - dialysis today for clearance and UF   Revaclear 300, , , 2k bath   Goal UF will start at 2kg over 3 hours

## 2018-04-03 NOTE — PROGRESS NOTE ADULT - PROBLEM SELECTOR PLAN 4
mental status remains overall poor.  Pt sleeps most of the day, follows some simple commands.  remains dependent for all care.

## 2018-04-03 NOTE — PROGRESS NOTE ADULT - SUBJECTIVE AND OBJECTIVE BOX
Patient seen and examined at bedside. Patient is a 63 year old male with a history of HFrEF 10-15% due to ischemic cardiomyopathy, s/p AICD, ?atrial fibrillation, hypertension, diabetes mellitus type 2, gout, and CKD for whom nephrology was called for GILMER from [facility] requiring hemodialysis. Patient was last dialyzed 4/2 with UF of 2.5kg. Patient remains intubated.     acetaminophen    Suspension. 650 milliGRAM(s) every 6 hours PRN  chlorhexidine 0.12% Liquid 15 milliLiter(s) two times a day  chlorhexidine 2% Cloths 1 Application(s) daily  dextrose 5%. 1000 milliLiter(s) <Continuous>  dextrose 50% Injectable 12.5 Gram(s) once  dextrose 50% Injectable 25 Gram(s) once  dextrose 50% Injectable 25 Gram(s) once  dextrose Gel 1 Dose(s) once PRN  ergocalciferol Drops 6000 Unit(s) daily  escitalopram 5 milliGRAM(s) daily  folic acid 1 milliGRAM(s) daily  glucagon  Injectable 1 milliGRAM(s) once PRN  hydrocortisone sodium succinate Injectable 25 milliGRAM(s) every 12 hours  insulin glargine Injectable (LANTUS) 12 Unit(s) at bedtime  insulin lispro (HumaLOG) corrective regimen sliding scale   every 6 hours  LORazepam   Injectable 2 milliGRAM(s) every 4 hours PRN  metoclopramide Injectable 5 milliGRAM(s) every 8 hours  midodrine 12.5 milliGRAM(s) every 8 hours  multivitamin 1 Tablet(s) daily  norepinephrine Infusion 0.01 MICROgram(s)/kG/Min <Continuous>  pantoprazole   Suspension 40 milliGRAM(s) daily  sodium chloride 0.9% lock flush 10 milliLiter(s) every 1 hour PRN  sodium chloride 0.9% lock flush 10 milliLiter(s) every 12 hours PRN  sodium chloride 0.9% lock flush 20 milliLiter(s) once  thiamine 100 milliGRAM(s) daily    Allergies    No Known Allergies    Intolerances    T(C): , Max: 37.8 (04-03-18 @ 14:02)  T(F): , Max: 100 (04-03-18 @ 14:02)  HR: 84 (04-03-18 @ 13:10)  BP: 85/70 (04-03-18 @ 13:00)  BP(mean): 75 (04-03-18 @ 13:00)  RR: 18 (04-03-18 @ 13:10)  SpO2: 100% (04-03-18 @ 13:10)    04-02 @ 07:01  -  04-03 @ 07:00  --------------------------------------------------------  IN:    Enteral Tube Flush: 180 mL    Glucerna: 1608 mL    norepinephrine Infusion: 92 mL    Solution: 20 mL  Total IN: 1900 mL    OUT:    Other: 2500 mL  Total OUT: 2500 mL    Total NET: -600 mL    04-03 @ 07:01  -  04-03 @ 14:09  --------------------------------------------------------  IN:    Enteral Tube Flush: 60 mL    Glucerna: 335 mL    norepinephrine Infusion: 3.2 mL  Total IN: 398.2 mL    OUT:  Total OUT: 0 mL    Total NET: 398.2 mL    LABS:                        7.6    4.3   )-----------( 29       ( 03 Apr 2018 06:15 )             25.1     04-03    135  |  97  |  50<H>  ----------------------------<  123<H>  5.6<H>   |  26  |  3.17<H>    Ca    8.7      03 Apr 2018 06:15  Phos  2.3     04-03  Mg     2.0     04-03    TPro  6.6  /  Alb  3.6  /  TBili  1.0  /  DBili  x   /  AST  135<H>  /  ALT  156<H>  /  AlkPhos  316<H>  04-03    PT/INR - ( 03 Apr 2018 06:15 )   PT: 18.9 sec;   INR: 1.68

## 2018-04-03 NOTE — PROGRESS NOTE ADULT - ASSESSMENT
Patient is a 63 year old male with a history of HFrEF 10-15% due to ischemic cardiomyopathy, s/p AICD, ?atrial fibrillation, hypertension, diabetes mellitus type 2, gout, and CKD for whom nephrology was called for GILMER from [facility] requiring hemodialysis.

## 2018-04-03 NOTE — CONSULT NOTE ADULT - ASSESSMENT
63yr old M with PMhx significant for HFrEF 10-15% 2/2 ischemic cardiomyopathy, sp AICD, CAD sp MI, Afib, HTN, DMII on insulin, CKD, gout, who was admitted for management of septic shock, likely 2/2 cellulitis of LE. Patients hospital course has been complicated by respiratory failure requiring intubation, and inability to extubate, AMS thought to be 2/2 CVA vs seizure, ARF 2/2 ischemic ATN on HD, A fib with RVR, Acute on chronic CHF exacerbation, transaminitis, thrombocytopenia.  GI consulted for dysphagia.    Dysphagia -   - given patients mental status will likely need a PEG for feeding  - likely unable to participate in speech and swallow evaluation  - discussed risks, benefits, alternatives of PEG with daughter at bedside - and she is agreeable with proceeding with PEG placement  - please correct thrombocytopenia (>50K)  - please correct coagulopathy (INR <1.5)  - will plan for PEG as schedule permits (maybe tomorrow afternoon)      Discussed with attending Dr Lena URIARTE following 63yr old M with PMhx significant for HFrEF 10-15% 2/2 ischemic cardiomyopathy, sp AICD, CAD sp MI, Afib, HTN, DMII on insulin, CKD, gout, who was admitted for management of septic shock, likely 2/2 cellulitis of LE. Patients hospital course has been complicated by respiratory failure requiring intubation, and inability to extubate, AMS thought to be 2/2 CVA vs seizure, ARF 2/2 ischemic [facility] on HD, A fib with RVR, Acute on chronic CHF exacerbation, transaminitis, thrombocytopenia.  GI consulted for dysphagia.    Dysphagia -   - given patients mental status will likely need a PEG for feeding  - likely unable to participate in speech and swallow evaluation  - discussed risks, benefits, alternatives of PEG with daughter at bedside - and she is agreeable with proceeding with PEG placement  - please correct thrombocytopenia (>50K)  - please correct coagulopathy (INR <1.5)  - hold feeds from 12midnite  - will plan for PEG as schedule permits (maybe tomorrow afternoon)      Discussed with attending Dr Paredes  GI following

## 2018-04-03 NOTE — PROGRESS NOTE ADULT - SUBJECTIVE AND OBJECTIVE BOX
Patient was seen and evaluated on dialysis.   Patient is tolerating the procedure well.   HR: 96 (04-03-18 @ 15:00)  BP: 106/75 (04-03-18 @ 14:00)  Continue dialysis:   Dialyzer:  Revaclear 300        QB:   400     QD: 500 2K bath   Goal UF 2kg over 3 Hours

## 2018-04-03 NOTE — PROGRESS NOTE ADULT - SUBJECTIVE AND OBJECTIVE BOX
INTERVAL HPI/OVERNIGHT EVENTS: titrated down levo     SUBJECTIVE: Patient seen and examined at bedside. Responded to verbal stimuli. Nods head to questions. Able to wiggle toes but not hands.     OBJECTIVE:    VITAL SIGNS:  ICU Vital Signs Last 24 Hrs  T(C): 37.8 (03 Apr 2018 14:02), Max: 37.8 (03 Apr 2018 14:02)  T(F): 100 (03 Apr 2018 14:02), Max: 100 (03 Apr 2018 14:02)  HR: 84 (03 Apr 2018 13:10) (80 - 104)  BP: 85/70 (03 Apr 2018 13:00) (79/65 - 102/86)  BP(mean): 75 (03 Apr 2018 13:00) (69 - 94)  ABP: 92/58 (03 Apr 2018 13:10) (84/54 - 130/78)  ABP(mean): 70 (03 Apr 2018 13:10) (65 - 102)  RR: 18 (03 Apr 2018 13:10) (9 - 28)  SpO2: 100% (03 Apr 2018 13:10) (98% - 100%)    Mode: CPAP with PS, FiO2: 40, PEEP: 5, PS: 8, MAP: 7.6, PIP: 14    04-02 @ 07:01  -  04-03 @ 07:00  --------------------------------------------------------  IN: 1900 mL / OUT: 2500 mL / NET: -600 mL    04-03 @ 07:01  -  04-03 @ 14:05  --------------------------------------------------------  IN: 398.2 mL / OUT: 0 mL / NET: 398.2 mL      CAPILLARY BLOOD GLUCOSE      POCT Blood Glucose.: 175 mg/dL (03 Apr 2018 11:52)      PHYSICAL EXAM:    General: Intubated.   HEENT: NC/AT; no tracking. PEERL  Neck: supple, R HD cath,   Respiratory: Crackles R base   Cardiovascular: +S1/S2; RRR, ii/vi systolic murmur   Abdomen: soft, distended; +BS x4  : Decreased testicular edema   Extremities:  1+ LE edema. b/l venous stasis changes. warm to touch   Vascular: WWP, 2+ peripheral pulses b/l;  Skin: normal color and turgor; no rash  Neuro: Does not open eyes. Does not respond to painful stimuli. does not follow commands       MEDICATIONS:  MEDICATIONS  (STANDING):  chlorhexidine 0.12% Liquid 15 milliLiter(s) Swish and Spit two times a day  chlorhexidine 2% Cloths 1 Application(s) Topical daily  dextrose 5%. 1000 milliLiter(s) (50 mL/Hr) IV Continuous <Continuous>  dextrose 50% Injectable 12.5 Gram(s) IV Push once  dextrose 50% Injectable 25 Gram(s) IV Push once  dextrose 50% Injectable 25 Gram(s) IV Push once  ergocalciferol Drops 6000 Unit(s) Oral daily  escitalopram 5 milliGRAM(s) Oral daily  folic acid 1 milliGRAM(s) Oral daily  hydrocortisone sodium succinate Injectable 25 milliGRAM(s) IV Push every 12 hours  insulin glargine Injectable (LANTUS) 12 Unit(s) SubCutaneous at bedtime  insulin lispro (HumaLOG) corrective regimen sliding scale   SubCutaneous every 6 hours  metoclopramide Injectable 5 milliGRAM(s) IV Push every 8 hours  midodrine 12.5 milliGRAM(s) Oral every 8 hours  multivitamin 1 Tablet(s) Oral daily  norepinephrine Infusion 0.01 MICROgram(s)/kG/Min (1.5 mL/Hr) IV Continuous <Continuous>  pantoprazole   Suspension 40 milliGRAM(s) Oral daily  sodium chloride 0.9% lock flush 20 milliLiter(s) IV Push once  thiamine 100 milliGRAM(s) Oral daily    MEDICATIONS  (PRN):  acetaminophen    Suspension. 650 milliGRAM(s) Oral every 6 hours PRN Moderate Pain (4 - 6)  dextrose Gel 1 Dose(s) Oral once PRN Blood Glucose LESS THAN 70 milliGRAM(s)/deciliter  glucagon  Injectable 1 milliGRAM(s) IntraMuscular once PRN Glucose LESS THAN 70 milligrams/deciliter  LORazepam   Injectable 2 milliGRAM(s) IV Push every 4 hours PRN Anxiety  sodium chloride 0.9% lock flush 10 milliLiter(s) IV Push every 1 hour PRN After each medication administration  sodium chloride 0.9% lock flush 10 milliLiter(s) IV Push every 12 hours PRN Lumen of catheter NOT used      ALLERGIES:  Allergies    No Known Allergies    Intolerances        LABS:                        7.6    4.3   )-----------( 29       ( 03 Apr 2018 06:15 )             25.1     04-03    135  |  97  |  50<H>  ----------------------------<  123<H>  5.6<H>   |  26  |  3.17<H>    Ca    8.7      03 Apr 2018 06:15  Phos  2.3     04-03  Mg     2.0     04-03    TPro  6.6  /  Alb  3.6  /  TBili  1.0  /  DBili  x   /  AST  135<H>  /  ALT  156<H>  /  AlkPhos  316<H>  04-03    PT/INR - ( 03 Apr 2018 06:15 )   PT: 18.9 sec;   INR: 1.68                RADIOLOGY & ADDITIONAL TESTS: Reviewed. INTERVAL HPI/OVERNIGHT EVENTS: titrated down levo     SUBJECTIVE: Patient seen and examined at bedside. Responded to verbal stimuli. Nods head to questions. Able to wiggle toes but not hands.     OBJECTIVE:    VITAL SIGNS:  ICU Vital Signs Last 24 Hrs  T(C): 37.8 (03 Apr 2018 14:02), Max: 37.8 (03 Apr 2018 14:02)  T(F): 100 (03 Apr 2018 14:02), Max: 100 (03 Apr 2018 14:02)  HR: 84 (03 Apr 2018 13:10) (80 - 104)  BP: 85/70 (03 Apr 2018 13:00) (79/65 - 102/86)  BP(mean): 75 (03 Apr 2018 13:00) (69 - 94)  ABP: 92/58 (03 Apr 2018 13:10) (84/54 - 130/78)  ABP(mean): 70 (03 Apr 2018 13:10) (65 - 102)  RR: 18 (03 Apr 2018 13:10) (9 - 28)  SpO2: 100% (03 Apr 2018 13:10) (98% - 100%)    Mode: CPAP with PS, FiO2: 40, PEEP: 5, PS: 8, MAP: 7.6, PIP: 14    04-02 @ 07:01  -  04-03 @ 07:00  --------------------------------------------------------  IN: 1900 mL / OUT: 2500 mL / NET: -600 mL    04-03 @ 07:01  -  04-03 @ 14:05  --------------------------------------------------------  IN: 398.2 mL / OUT: 0 mL / NET: 398.2 mL      CAPILLARY BLOOD GLUCOSE      POCT Blood Glucose.: 175 mg/dL (03 Apr 2018 11:52)      PHYSICAL EXAM:    General: Intubated.   HEENT: NC/AT; no tracking. PEERL  Neck: supple, R HD cath,   Respiratory: Crackles R base   Cardiovascular: +S1/S2; RRR, ii/vi systolic murmur   Abdomen: soft, non distended; +BS x4  : Decreased scrotaledema   Extremities:  2+ LE edema. b/l venous stasis changes. warm to touch R arm mittens  Vascular: WWP, 2+ peripheral pulses b/l;  Skin: normal color and turgor; no rash  Neuro: Responded to verbal stimuli. Nods head to questions. Able to wiggle toes but not hands.       MEDICATIONS:  MEDICATIONS  (STANDING):  chlorhexidine 0.12% Liquid 15 milliLiter(s) Swish and Spit two times a day  chlorhexidine 2% Cloths 1 Application(s) Topical daily  dextrose 5%. 1000 milliLiter(s) (50 mL/Hr) IV Continuous <Continuous>  dextrose 50% Injectable 12.5 Gram(s) IV Push once  dextrose 50% Injectable 25 Gram(s) IV Push once  dextrose 50% Injectable 25 Gram(s) IV Push once  ergocalciferol Drops 6000 Unit(s) Oral daily  escitalopram 5 milliGRAM(s) Oral daily  folic acid 1 milliGRAM(s) Oral daily  hydrocortisone sodium succinate Injectable 25 milliGRAM(s) IV Push every 12 hours  insulin glargine Injectable (LANTUS) 12 Unit(s) SubCutaneous at bedtime  insulin lispro (HumaLOG) corrective regimen sliding scale   SubCutaneous every 6 hours  metoclopramide Injectable 5 milliGRAM(s) IV Push every 8 hours  midodrine 12.5 milliGRAM(s) Oral every 8 hours  multivitamin 1 Tablet(s) Oral daily  norepinephrine Infusion 0.01 MICROgram(s)/kG/Min (1.5 mL/Hr) IV Continuous <Continuous>  pantoprazole   Suspension 40 milliGRAM(s) Oral daily  sodium chloride 0.9% lock flush 20 milliLiter(s) IV Push once  thiamine 100 milliGRAM(s) Oral daily    MEDICATIONS  (PRN):  acetaminophen    Suspension. 650 milliGRAM(s) Oral every 6 hours PRN Moderate Pain (4 - 6)  dextrose Gel 1 Dose(s) Oral once PRN Blood Glucose LESS THAN 70 milliGRAM(s)/deciliter  glucagon  Injectable 1 milliGRAM(s) IntraMuscular once PRN Glucose LESS THAN 70 milligrams/deciliter  LORazepam   Injectable 2 milliGRAM(s) IV Push every 4 hours PRN Anxiety  sodium chloride 0.9% lock flush 10 milliLiter(s) IV Push every 1 hour PRN After each medication administration  sodium chloride 0.9% lock flush 10 milliLiter(s) IV Push every 12 hours PRN Lumen of catheter NOT used      ALLERGIES:  Allergies    No Known Allergies    Intolerances        LABS:                        7.6    4.3   )-----------( 29       ( 03 Apr 2018 06:15 )             25.1     04-03    135  |  97  |  50<H>  ----------------------------<  123<H>  5.6<H>   |  26  |  3.17<H>    Ca    8.7      03 Apr 2018 06:15  Phos  2.3     04-03  Mg     2.0     04-03    TPro  6.6  /  Alb  3.6  /  TBili  1.0  /  DBili  x   /  AST  135<H>  /  ALT  156<H>  /  AlkPhos  316<H>  04-03    PT/INR - ( 03 Apr 2018 06:15 )   PT: 18.9 sec;   INR: 1.68                RADIOLOGY & ADDITIONAL TESTS: Reviewed. INTERVAL HPI/OVERNIGHT EVENTS: titrated down levo     SUBJECTIVE: Patient seen and examined at bedside. Responded to verbal stimuli. Nods head to questions. Able to wiggle toes but not hands. ROS not obtainable    OBJECTIVE:    VITAL SIGNS:  ICU Vital Signs Last 24 Hrs  T(C): 37.8 (03 Apr 2018 14:02), Max: 37.8 (03 Apr 2018 14:02)  T(F): 100 (03 Apr 2018 14:02), Max: 100 (03 Apr 2018 14:02)  HR: 84 (03 Apr 2018 13:10) (80 - 104)  BP: 85/70 (03 Apr 2018 13:00) (79/65 - 102/86)  BP(mean): 75 (03 Apr 2018 13:00) (69 - 94)  ABP: 92/58 (03 Apr 2018 13:10) (84/54 - 130/78)  ABP(mean): 70 (03 Apr 2018 13:10) (65 - 102)  RR: 18 (03 Apr 2018 13:10) (9 - 28)  SpO2: 100% (03 Apr 2018 13:10) (98% - 100%)    Mode: CPAP with PS, FiO2: 40, PEEP: 5, PS: 8, MAP: 7.6, PIP: 14    04-02 @ 07:01  -  04-03 @ 07:00  --------------------------------------------------------  IN: 1900 mL / OUT: 2500 mL / NET: -600 mL    04-03 @ 07:01  -  04-03 @ 14:05  --------------------------------------------------------  IN: 398.2 mL / OUT: 0 mL / NET: 398.2 mL      CAPILLARY BLOOD GLUCOSE      POCT Blood Glucose.: 175 mg/dL (03 Apr 2018 11:52)      PHYSICAL EXAM:    General: Intubated.   HEENT: NC/AT; no tracking. PEERL  Neck: supple, R HD cath,   Respiratory: Crackles R base   Cardiovascular: +S1/S2; RRR, ii/vi systolic murmur   Abdomen: soft, non distended; +BS x4  : Decreased scrotaledema   Extremities:  2+ LE edema. b/l venous stasis changes. warm to touch R arm mittens  Vascular: WWP, 2+ peripheral pulses b/l;  Skin: normal color and turgor; no rash  Neuro: Responded to verbal stimuli. Nods head to questions. Able to wiggle toes but not hands.       MEDICATIONS:  MEDICATIONS  (STANDING):  chlorhexidine 0.12% Liquid 15 milliLiter(s) Swish and Spit two times a day  chlorhexidine 2% Cloths 1 Application(s) Topical daily  dextrose 5%. 1000 milliLiter(s) (50 mL/Hr) IV Continuous <Continuous>  dextrose 50% Injectable 12.5 Gram(s) IV Push once  dextrose 50% Injectable 25 Gram(s) IV Push once  dextrose 50% Injectable 25 Gram(s) IV Push once  ergocalciferol Drops 6000 Unit(s) Oral daily  escitalopram 5 milliGRAM(s) Oral daily  folic acid 1 milliGRAM(s) Oral daily  hydrocortisone sodium succinate Injectable 25 milliGRAM(s) IV Push every 12 hours  insulin glargine Injectable (LANTUS) 12 Unit(s) SubCutaneous at bedtime  insulin lispro (HumaLOG) corrective regimen sliding scale   SubCutaneous every 6 hours  metoclopramide Injectable 5 milliGRAM(s) IV Push every 8 hours  midodrine 12.5 milliGRAM(s) Oral every 8 hours  multivitamin 1 Tablet(s) Oral daily  norepinephrine Infusion 0.01 MICROgram(s)/kG/Min (1.5 mL/Hr) IV Continuous <Continuous>  pantoprazole   Suspension 40 milliGRAM(s) Oral daily  sodium chloride 0.9% lock flush 20 milliLiter(s) IV Push once  thiamine 100 milliGRAM(s) Oral daily    MEDICATIONS  (PRN):  acetaminophen    Suspension. 650 milliGRAM(s) Oral every 6 hours PRN Moderate Pain (4 - 6)  dextrose Gel 1 Dose(s) Oral once PRN Blood Glucose LESS THAN 70 milliGRAM(s)/deciliter  glucagon  Injectable 1 milliGRAM(s) IntraMuscular once PRN Glucose LESS THAN 70 milligrams/deciliter  LORazepam   Injectable 2 milliGRAM(s) IV Push every 4 hours PRN Anxiety  sodium chloride 0.9% lock flush 10 milliLiter(s) IV Push every 1 hour PRN After each medication administration  sodium chloride 0.9% lock flush 10 milliLiter(s) IV Push every 12 hours PRN Lumen of catheter NOT used      ALLERGIES:  Allergies    No Known Allergies    Intolerances        LABS:                        7.6    4.3   )-----------( 29       ( 03 Apr 2018 06:15 )             25.1     04-03    135  |  97  |  50<H>  ----------------------------<  123<H>  5.6<H>   |  26  |  3.17<H>    Ca    8.7      03 Apr 2018 06:15  Phos  2.3     04-03  Mg     2.0     04-03    TPro  6.6  /  Alb  3.6  /  TBili  1.0  /  DBili  x   /  AST  135<H>  /  ALT  156<H>  /  AlkPhos  316<H>  04-03    PT/INR - ( 03 Apr 2018 06:15 )   PT: 18.9 sec;   INR: 1.68                RADIOLOGY & ADDITIONAL TESTS: Reviewed. HOSPITAL COURSE  63M PMH HFrEF 10-15% (ischemic), MI, s/p AICD vs PPM, possible Afib, HTN, DM2 on insulin, possible CKD, and gout, who presents with a chief complaint of generalized weakness was found to be in septic shock likely 2/2 LE cellulitis. Pt was started on levophed for hypotension and to help renal perfusion. Pt was also started on dobutamine given low mix venous saturation however had to be discontinued given tachycardia. Pt also experienced tachycardia on Milrinone. Pt was started on Vanco/ Zosyn and completed total 11 days of abx. PT was started on solucortef 50 q6h given recent hx of steroid use. Gallium did not show only LLE  cellulitis. TTe with no vegetations. TTE could not be performed. Given worsening GILMER and low UO, renal was consulted. HD cath was placed and pt was started on CVVD. Pt ws transtioned to Hemodialysis however given persistent hypotension, pt was switched back to CVVHD. Pt was noted have b/l pulmonary effusion and required R chest tube placement with fluid studies showing exudative process. Given extensive cardiac hx and component of cardiogenic shock, cardiology was consulted. Pt was started on Vasopressin to improve blood pressure. Pt also stated on Isodil and Hydralazine but pt was not  able to tolerate the medications. Pt was started  on Hep gtt for Afib but was transiently placed on Argatroban given possibility of HIT. HIT was ruled out. pt was bridged to coumadin.  . Pt was to be transferred to CCU for further management of Cardiogenic shock however on 3/22, pt became unresponsive post A line placement. vital signs were normal. Stroke code was called. Pt intubated for airway protection. CT was negative MRI done 4 days later showed chronic infarcts with possibly small brainstem stroke per neuro but symptoms dont correlate. Pt was started on Modafinil however pt's arousability has not increased significantly. VEEG showed slowing but no seizures. in the interval, Pt had improvement in BP and was switched to Hd however he became hypotensive requiring Levophed. Pt started on Midodrine to wean off Levophed. Metoprolol d/c'd. ACTH stimulation test borderline normal-solucortef kept on. Pt also with worsening thrombocytopenia possibly from Zosyn/sepsis.  GOC discussion with family- Pt full code and to get Peg tube and Trach on 4/4.       INTERVAL HPI/OVERNIGHT EVENTS: titrated down levo     SUBJECTIVE: Patient seen and examined at bedside. Responded to verbal stimuli. Nods head to questions. Able to wiggle toes but not hands. ROS not obtainable    OBJECTIVE:    VITAL SIGNS:  ICU Vital Signs Last 24 Hrs  T(C): 37.8 (03 Apr 2018 14:02), Max: 37.8 (03 Apr 2018 14:02)  T(F): 100 (03 Apr 2018 14:02), Max: 100 (03 Apr 2018 14:02)  HR: 84 (03 Apr 2018 13:10) (80 - 104)  BP: 85/70 (03 Apr 2018 13:00) (79/65 - 102/86)  BP(mean): 75 (03 Apr 2018 13:00) (69 - 94)  ABP: 92/58 (03 Apr 2018 13:10) (84/54 - 130/78)  ABP(mean): 70 (03 Apr 2018 13:10) (65 - 102)  RR: 18 (03 Apr 2018 13:10) (9 - 28)  SpO2: 100% (03 Apr 2018 13:10) (98% - 100%)    Mode: CPAP with PS, FiO2: 40, PEEP: 5, PS: 8, MAP: 7.6, PIP: 14    04-02 @ 07:01  -  04-03 @ 07:00  --------------------------------------------------------  IN: 1900 mL / OUT: 2500 mL / NET: -600 mL    04-03 @ 07:01  -  04-03 @ 14:05  --------------------------------------------------------  IN: 398.2 mL / OUT: 0 mL / NET: 398.2 mL      CAPILLARY BLOOD GLUCOSE      POCT Blood Glucose.: 175 mg/dL (03 Apr 2018 11:52)      PHYSICAL EXAM:    General: Intubated.   HEENT: NC/AT; no tracking. PEERL  Neck: supple, R HD cath,   Respiratory: Crackles R base   Cardiovascular: +S1/S2; RRR, ii/vi systolic murmur   Abdomen: soft, non distended; +BS x4  : Decreased scrotaledema   Extremities:  2+ LE edema. b/l venous stasis changes. warm to touch R arm mittens  Vascular: WWP, 2+ peripheral pulses b/l;  Skin: normal color and turgor; no rash  Neuro: Responded to verbal stimuli. Nods head to questions. Able to wiggle toes but not hands.       MEDICATIONS:  MEDICATIONS  (STANDING):  chlorhexidine 0.12% Liquid 15 milliLiter(s) Swish and Spit two times a day  chlorhexidine 2% Cloths 1 Application(s) Topical daily  dextrose 5%. 1000 milliLiter(s) (50 mL/Hr) IV Continuous <Continuous>  dextrose 50% Injectable 12.5 Gram(s) IV Push once  dextrose 50% Injectable 25 Gram(s) IV Push once  dextrose 50% Injectable 25 Gram(s) IV Push once  ergocalciferol Drops 6000 Unit(s) Oral daily  escitalopram 5 milliGRAM(s) Oral daily  folic acid 1 milliGRAM(s) Oral daily  hydrocortisone sodium succinate Injectable 25 milliGRAM(s) IV Push every 12 hours  insulin glargine Injectable (LANTUS) 12 Unit(s) SubCutaneous at bedtime  insulin lispro (HumaLOG) corrective regimen sliding scale   SubCutaneous every 6 hours  metoclopramide Injectable 5 milliGRAM(s) IV Push every 8 hours  midodrine 12.5 milliGRAM(s) Oral every 8 hours  multivitamin 1 Tablet(s) Oral daily  norepinephrine Infusion 0.01 MICROgram(s)/kG/Min (1.5 mL/Hr) IV Continuous <Continuous>  pantoprazole   Suspension 40 milliGRAM(s) Oral daily  sodium chloride 0.9% lock flush 20 milliLiter(s) IV Push once  thiamine 100 milliGRAM(s) Oral daily    MEDICATIONS  (PRN):  acetaminophen    Suspension. 650 milliGRAM(s) Oral every 6 hours PRN Moderate Pain (4 - 6)  dextrose Gel 1 Dose(s) Oral once PRN Blood Glucose LESS THAN 70 milliGRAM(s)/deciliter  glucagon  Injectable 1 milliGRAM(s) IntraMuscular once PRN Glucose LESS THAN 70 milligrams/deciliter  LORazepam   Injectable 2 milliGRAM(s) IV Push every 4 hours PRN Anxiety  sodium chloride 0.9% lock flush 10 milliLiter(s) IV Push every 1 hour PRN After each medication administration  sodium chloride 0.9% lock flush 10 milliLiter(s) IV Push every 12 hours PRN Lumen of catheter NOT used      ALLERGIES:  Allergies    No Known Allergies    Intolerances        LABS:                        7.6    4.3   )-----------( 29       ( 03 Apr 2018 06:15 )             25.1     04-03    135  |  97  |  50<H>  ----------------------------<  123<H>  5.6<H>   |  26  |  3.17<H>    Ca    8.7      03 Apr 2018 06:15  Phos  2.3     04-03  Mg     2.0     04-03    TPro  6.6  /  Alb  3.6  /  TBili  1.0  /  DBili  x   /  AST  135<H>  /  ALT  156<H>  /  AlkPhos  316<H>  04-03    PT/INR - ( 03 Apr 2018 06:15 )   PT: 18.9 sec;   INR: 1.68                RADIOLOGY & ADDITIONAL TESTS: Reviewed.

## 2018-04-03 NOTE — CONSULT NOTE ADULT - SUBJECTIVE AND OBJECTIVE BOX
HPI:  63yr old M with PMhx significant for HFrEF 10-15% 2/2 ischemic cardiomyopathy, sp AICD, CAD sp MI, Afib, HTN, DMII on insulin, CKD, gout, who was admitted for management of septic shock, likely 2/2 cellulitis of LE. Patients hospital course has been complicated by respiratory failure requiring intubation, and inability to extubate, AMS thought to be 2/2 CVA vs seizure, ARF 2/2 ischemic ATN on HD, A fib with RVR, Acute on chronic CHF exacerbation, transaminitis, thrombocytopenia.  GI consulted for dysphagia.  Patient is intubated and unable to give any hx. Family at bedside and state that they are ok with PEG tube placement at this point.     EGD - unable to recall  Colonoscopy - unable to recall      PAST MEDICAL & SURGICAL HISTORY:  Type 2 diabetes mellitus with diabetic peripheral angiopathy without gangrene, with long-term curren  Essential hypertension, benign  Gout  Pacemaker  Chronic systolic heart failure  Myocardial infarction  No significant past surgical history      REVIEW OF SYSTEMS  Apart from items noted in the HPI a 10point ROS was unable to be performed due to patients mental status      MEDICATIONS  (STANDING):  albumin human 25% IVPB 50 milliLiter(s) IV Intermittent every 1 hour  chlorhexidine 0.12% Liquid 15 milliLiter(s) Swish and Spit two times a day  chlorhexidine 2% Cloths 1 Application(s) Topical daily  dextrose 5%. 1000 milliLiter(s) (50 mL/Hr) IV Continuous <Continuous>  dextrose 50% Injectable 12.5 Gram(s) IV Push once  dextrose 50% Injectable 25 Gram(s) IV Push once  dextrose 50% Injectable 25 Gram(s) IV Push once  ergocalciferol Drops 6000 Unit(s) Oral daily  escitalopram 5 milliGRAM(s) Oral daily  folic acid 1 milliGRAM(s) Oral daily  hydrocortisone sodium succinate Injectable 25 milliGRAM(s) IV Push every 12 hours  insulin glargine Injectable (LANTUS) 12 Unit(s) SubCutaneous at bedtime  insulin lispro (HumaLOG) corrective regimen sliding scale   SubCutaneous every 6 hours  metoclopramide Injectable 5 milliGRAM(s) IV Push every 8 hours  midodrine 12.5 milliGRAM(s) Oral every 8 hours  multivitamin 1 Tablet(s) Oral daily  norepinephrine Infusion 0.01 MICROgram(s)/kG/Min (1.5 mL/Hr) IV Continuous <Continuous>  pantoprazole   Suspension 40 milliGRAM(s) Oral daily  sodium chloride 0.9% lock flush 20 milliLiter(s) IV Push once  thiamine 100 milliGRAM(s) Oral daily    MEDICATIONS  (PRN):  acetaminophen    Suspension. 650 milliGRAM(s) Oral every 6 hours PRN Moderate Pain (4 - 6)  dextrose Gel 1 Dose(s) Oral once PRN Blood Glucose LESS THAN 70 milliGRAM(s)/deciliter  glucagon  Injectable 1 milliGRAM(s) IntraMuscular once PRN Glucose LESS THAN 70 milligrams/deciliter  LORazepam   Injectable 2 milliGRAM(s) IV Push every 4 hours PRN Anxiety  sodium chloride 0.9% lock flush 10 milliLiter(s) IV Push every 1 hour PRN After each medication administration  sodium chloride 0.9% lock flush 10 milliLiter(s) IV Push every 12 hours PRN Lumen of catheter NOT used      Allergies  No Known Allergies    Intolerances      SOCIAL HISTORY:  Denies toxic or illicit habits    FAMILY HISTORY:  Denies FHx of stomach/colon/pancreatic cancer, IBD or liver issues    Vital Signs Last 24 Hrs  T(C): 37.8 (03 Apr 2018 14:02), Max: 37.8 (03 Apr 2018 14:02)  T(F): 100 (03 Apr 2018 14:02), Max: 100 (03 Apr 2018 14:02)  HR: 84 (03 Apr 2018 14:00) (80 - 104)  BP: 83/65 (03 Apr 2018 14:00) (79/65 - 102/86)  BP(mean): 71 (03 Apr 2018 14:00) (69 - 94)  RR: 13 (03 Apr 2018 14:00) (9 - 28)  SpO2: 100% (03 Apr 2018 14:00) (98% - 100%)    PHYSICAL EXAM:  GEN: middle aged M lying in bed in no distress, intubated, off sedation, awake, but no purposeful movements  Respiratory: CTA  Cardiovascular: s1 s2 no M irregular  Gastrointestinal: full, soft, BS+, NT, NR, NG, no masses or organs palpated  Extremities: no edema  Neurological: awake  Skin: intact      LABS:                     7.6    4.3   )-----------( 29       ( 03 Apr 2018 06:15 )             25.1       135  |  97  |  50<H>  ----------------------------<  123<H>  5.6<H>   |  26  |  3.17<H>    Ca    8.7      03 Apr 2018 06:15  Phos  2.3     04-03  Mg     2.0     04-03    TPro  6.6  /  Alb  3.6  /  TBili  1.0  /  DBili  x   /  AST  135<H>  /  ALT  156<H>  /  AlkPhos  316<H>  04-03    PT/INR - ( 03 Apr 2018 06:15 )   PT: 18.9 sec;   INR: 1.68                RADIOLOGY & ADDITIONAL STUDIES:

## 2018-04-03 NOTE — PROGRESS NOTE ADULT - PROBLEM SELECTOR PLAN 6
Advance care planning meeting  Start time:  1100am  End time: 1130pm  Total time: 30 min     A face to face meeting to discuss advance care planning was held today regarding: HUNTER BRIZUELA  Primary decision maker:   pts dgt Cristal  Alternate/surrogate:  none  Discussed advance directives including, but not limited to, healthcare proxy and code status.  Reviewed plan of care. Pts dgt wants to have tracheostomy placed as well as a PEG tube, in the hopes that pt will recover further.  DNR status reviewed.  At this time, pts HCP wishes pt to remains full code.  Encouraged serious consideration of a DNR order  as pt so seriously ill with multisystem organ failure.    Decision regarding code status: full code  Documentation completed today:  none

## 2018-04-03 NOTE — PROGRESS NOTE ADULT - PROBLEM SELECTOR PLAN 3
family wants to have trache placed to allow more time for neurologic recovery  Pt is doing well on CPAP trials but may have difficulty maintaining and clearing  his airway.

## 2018-04-03 NOTE — PROGRESS NOTE ADULT - PROBLEM SELECTOR PLAN 8
discussed again today.  Pts HCP wants to maintain full code status, despite pts overall poor prognosis in the setting of multisystem organ failure.

## 2018-04-03 NOTE — PROGRESS NOTE ADULT - ASSESSMENT
63M PMH HFrEF 10-15% (ischemic), MI, s/p AICD vs PPM, possible Afib, HTN, DM2 on insulin, possible CKD, and gout, who presents with a chief complaint of generalized weakness s/p septic shock likely 2/2 LE cellulitis. Now with AMS likely from brainstem infarct and toxic metabolic encephalopathy. PT to get PEG/Trach     NEURO  #AMS  - Pt non responsive since last week. VItal signs normal during the event. Pt intubated for airway protection. Stroke code called. CT, CTA negative. VEEG in place- moderate slowing but no seizure activity.   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 Per neuro, event could have resulted from hypoperfusion.   - Started on Modafinil.   - no seizure activity on VEEG.   - Unclear neuro prognosis at this point. Pt cannot protect airway.   - Per family discussion, pt to remain Full code and will get PEG and Trach. ENT/GI consulted   -  TTE with Affinity shows no clot.     #   ID- septic shock 2/2 Cellulitis-   - Changed midodrine to 12.5q8 given hypotension. Continues to require Levophed. Low BP since intermittent HD was started.   - c/w solucortef 25/20 dose. ACTH stim test was borderline normal. Will keep steroids on.  -.Discontinued zosyn for suspected UTI,(3/25-3/26)  s/p Zosyn (3/11-2/21). Was previously also on Vanco (3/11-3/17).   - RUQ dopper shows no portal vein thrombosis. given worsening transaminitis and increased residuals RUQ sono was obtained which only shows cholelithiasis.   - TTE with no vegetations. Pt not stable for RUKHSANA.   - Gallium scan read showed LLE cellulitis.     - repeat LE CT does not show abscess or gas.   - R lung Effusion likely from overload. s/p thoracentesis with improvement in resp status   - HIV negative  - Vascular surgery on board. Recommending Amputation as an option if pt continues to be unstable.     #UTI- Urine cloudy appearing with increase WBC.  - discontinued zosyn 3/25-3/26    CARDIOVASCULAR   # HYPOTENSION-   -  Changed midodrine to 12 q8h to maintain SBP>65 and to attempt weaning off Levophed.  Low BP since switch to intermittent HD   - Will keep Solucortef on given hypotension and borderline normal ACTH stimulation test.   - pending transfer to CCU  - Gallium scan with only LLE cellulitis   - Lactate downtrended to WNL   - CHF with severely reduced EF 15%, caution with fluids. Per renal recs, can give IV albumin for fluids.    # CHF exacerbation  - CHF with EF 10-15% per pt 2/2 ischemic cardiomyopathy s/p AICD as indicated by CXR   - Elevated BNP on admission with R sided effusion and edema  - Cardiac shock contributing to hypotension   - improvement in pulm congestion noted on chest Xray.   - Give fluids judiciously given low EF in setting of likely sepsis but for now try to obtain negative fluid balance with dialysis, watching BP  - Unclear medical regimen opt. Per [facility] pharmacy ( and [place]) pt has not picked up Torsemide 20 or Metoprolol 100 since November.   - Hold ACE/Metoprolol in setting of sepsis  - CVO saturation improved after transfusion    #AFIB  - Discontinuing Metoprolol 12.5 q12h given hypotension. Will use Dig for rate control if RVR   - PT with hx of Afib and LV thrombus on coumadin.    -d/c  Hep drip given concern of HIT. d/c Argatroban.  - Dose coumadin daily. OG tube placed. Dose held yesterday given thrombocytopenia. Will continue holding  - Pt s/p FFP (3/13,3/14) and Vit K (3/13) for thoracocentesis and HD catheter placement.   -TTE with affinity shows no  LV thrombus        #CAD- Hx of MI s/p AICD  - Pt with pLAD in 7/2017, was started on Aspirin and Brelinta per records.  - On asp 81, consider re-instating Atorvastatin 80 (discontinued 2/2 LFT uptrend, thrombocytopenia).   - Holding aspirin and Coumadin given thrombocytopenia     # LE Edema   - LE edema with chronic venous stasis.   - Duplex does not show DVT    PULMONARY   Intubated  - Intubated on 3/22 for airway protection   - ENT consulted for trach and PEG     #Acute resp failure- Resolved   - Intubated for airway protection   - pt with R loculated plural effusion noted to have increased work of breathing. Likely 2/2 fluid overload, Fluid studies consistent with exudative effusion.   - s/p thoracentesis on 3/13 with R chest tube placement. Chest tube removed 3/15.   - s/p  pRBC transfusion on 3/12. 3/16  - Monitor resp status  - Appreciate CHF team recs regarding fluid status. Continue dialysis for fluid removal      #RENAL   #ARF- 2/2 ischemic ATN  -Unknown baseline Cr presenting with Cr 3.93 with metabolic derangements.   - Likely 2/2 Sepsis, low intravascular volume and CHF  - Trend BUN and Cr.  - Renal team on board, pt with R HD catheter placed by IR on 3/21. Switching to HD on Friday   - Pt to undergo Hd session today. Will aim to remove 3-4 L given persistent fluid overload.   - Pt hypotensive since the switch.   - CT abdomen and pelvis without acute obstruction.   - retroperitoneal ultrasound showing medical renal disease.     #Hyperkalemia   -Resolved   - PT with elevated K to 5.7 on admission,  no EKG changes   - Continue telemetry monitoring  - Trend K   - Can given Kayexalate if persists   - c/w dialysis     #Metabolic acidosis   - 2/2 Lactate, starvation ketoacidosis and uremia   - resolved       #GI   # transaminits. Abd distended, increased residuals   -  RUQ US with cholelithiasis   - Reglan for motility.   - prior CT abdomen/pelvis consistent with cholelithiasis and ascites.     #PEG   - Pt with difficulty extubating due to airway protection   - GI consulted for PEG tube placement.     #HEME  #Thrombocytopenia  - ELVIRA negative but PF4 was positive. Unlikely HIT.    - Could been related to zosyn or sepsis   - Still downtrending. Will hold coumadin and Aspirin  -. Monitor for signs of bleeding   - WIll f/u recs from GI and ENT and give platelets prior to Trach/PEG      # elevated INR. Unclear etiology. Pt on coumadin  - Continue with daily coumadin dosing. Hold tonight   - Given Vit K and FFP3/12. FFP 3/13   - Monitor coags    #Anemia  - Likely 2/2 phlebotomy, no signs of bleeding. Will monitor given thrombocytopenia.   - Prior studies showed Anemia of chronic disease.  -s/p 1pRBC on 3/12, 3/16   - WIll keep Hb around 9-10     #ENDOCRINE   - S/p insulin drip  - c/w Lantus 12U given hypoglycemia     - Will adjust as we taper down steroids.    Monitor blood glucose and ISS coverage. pt with episodes of hypoglycemia.   - Monitor blood glucose   - A1C 8.6%      F: No IVF   E: Replete K<4 Mg<2, caution in setting of acute renal failure  N: OG tube placed, 1.5 Glucerna      Lines:  ETT (3/22), OG tube (3/21)R HD catheter (3/21)., L Midline 3/20,  Drips: Levophed     Full code  Dispo: MICU  Ppx: Coumadin (Held), PPI 63M PMH HFrEF 10-15% (ischemic), MI, s/p AICD vs PPM, possible Afib, HTN, DM2 on insulin, possible CKD, and gout, who presents with a chief complaint of generalized weakness s/p septic shock likely 2/2 LE cellulitis. Now with AMS likely from brainstem infarct and toxic metabolic encephalopathy. PT to get PEG/Trach     NEURO  #AMS  - Pt non responsive since last week. VItal signs normal during the event. Pt intubated for airway protection. Stroke code called. CT, CTA negative. VEEG in place- moderate slowing but no seizure activity.   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 Per neuro, event could have resulted from hypoperfusion.   - Started on Modafinil.   - no seizure activity on VEEG.   - Unclear neuro prognosis at this point. Pt cannot protect airway.   - Per family discussion, pt to remain Full code and will get PEG and Trach. ENT/GI consulted   -  TTE with Definity shows no clot.     #   ID- septic shock 2/2 Cellulitis-   - Changed midodrine to 12.5q8 given hypotension. Continues to require Levophed. Low BP since intermittent HD was started.   - c/w solucortef 25/20 dose. ACTH stim test was borderline normal. Will keep steroids on.  -.Discontinued zosyn for suspected UTI,(3/25-3/26)  s/p Zosyn (3/11-2/21). Was previously also on Vanco (3/11-3/17).   - RUQ doppler shows no portal vein thrombosis. given worsening transaminitis and increased residuals RUQ sono was obtained which only shows cholelithiasis.   - TTE with no vegetations. Pt not stable for RUKHSANA.   - Gallium scan read showed LLE cellulitis.     - repeat LE CT does not show abscess or gas.   - R lung Effusion likely from overload. s/p thoracentesis with improvement in resp status   - HIV negative  - Vascular surgery on board. Recommending Amputation as an option if pt continues to be unstable.     #UTI- Urine cloudy appearing with increase WBC.  - discontinued zosyn 3/25-3/26    CARDIOVASCULAR   # HYPOTENSION-   -  Changed midodrine to 12.5 mg q8h to maintain SBP>65 and to attempt weaning off Levophed.  Low BP since switch to intermittent HD   - Will keep Solucortef on given hypotension and borderline normal ACTH stimulation test.   - pending transfer to CCU  - Gallium scan with only LLE cellulitis   - Lactate downtrended to WNL   - CHF with severely reduced EF 15%, caution with fluids. Per renal recs, can give IV albumin for fluids.    # CHF exacerbation  - CHF with EF 10-15% per pt 2/2 ischemic cardiomyopathy s/p AICD as indicated by CXR   - Elevated BNP on admission with R sided effusion and edema  - Cardiac shock contributing to hypotension   - improvement in pulm congestion noted on chest Xray.   - Give fluids judiciously given low EF in setting of likely sepsis but for now try to obtain negative fluid balance with dialysis, watching BP  - Unclear medical regimen opt. Per [facility] pharmacy ( and [place]) pt has not picked up Torsemide 20 or Metoprolol 100 since November.   - Hold ACE/Metoprolol in setting of sepsis  - CVO saturation improved after transfusion    #AFIB  - Discontinuing Metoprolol 12.5 q12h given hypotension. Will use Dig for rate control if RVR   - PT with hx of Afib and LV thrombus on coumadin.    -d/c  Hep drip given concern of HIT. d/c Argatroban.  - Dose coumadin daily. OG tube placed. Dose held yesterday given thrombocytopenia. Will continue holding  - Pt s/p FFP (3/13,3/14) and Vit K (3/13) for thoracocentesis and HD catheter placement.   -TTE with affinity shows no  LV thrombus        #CAD- Hx of MI s/p AICD  - Pt with pLAD in 7/2017, was started on Aspirin and Brelinta per records.  - On asp 81, consider re-instating Atorvastatin 80 (discontinued 2/2 LFT uptrend, thrombocytopenia).   - Holding aspirin and Coumadin given thrombocytopenia     # LE Edema   - LE edema with chronic venous stasis.   - Duplex does not show DVT    PULMONARY   Intubated  - Intubated on 3/22 for airway protection   - ENT consulted for trach      #Acute resp failure- Resolved   - Intubated for airway protection   - pt with R loculated plural effusion noted to have increased work of breathing. Likely 2/2 fluid overload, Fluid studies consistent with exudative effusion.   - s/p thoracentesis on 3/13 with R chest tube placement. Chest tube removed 3/15.   - s/p  pRBC transfusion on 3/12. 3/16  - Monitor resp status  - Appreciate CHF team recs regarding fluid status. Continue dialysis for fluid removal      #RENAL   #ARF- 2/2 ischemic ATN  -Unknown baseline Cr presenting with Cr 3.93 with metabolic derangements.   - Likely 2/2 Sepsis, low intravascular volume and CHF  - Trend BUN and Cr.  - Renal team on board, pt with R HD catheter placed by IR on 3/21. Switching to HD on Friday   - Pt to undergo Hd session today. Will aim to remove 3-4 L given persistent fluid overload.   - Pt hypotensive since the switch.   - CT abdomen and pelvis without acute obstruction.   - retroperitoneal ultrasound showing medical renal disease.     #Hyperkalemia   -Resolved   - PT with elevated K to 5.7 on admission,  no EKG changes   - Continue telemetry monitoring  - Trend K   - Can given Kayexalate if persists   - c/w dialysis     #Metabolic acidosis   - 2/2 Lactate, starvation ketoacidosis and uremia   - resolved       #GI   # transaminits. Abd distended, increased residuals   -  RUQ US with cholelithiasis   - Reglan for motility.   - prior CT abdomen/pelvis consistent with cholelithiasis and ascites.     #PEG   - Pt with difficulty extubating due to airway protection   - GI consulted for PEG tube placement.     #HEME  #Thrombocytopenia  - ELVIRA negative but PF4 was positive. Unlikely HIT.    - Could been related to zosyn or sepsis   - Still downtrending. Will hold coumadin and Aspirin  -. Monitor for signs of bleeding   - WIll f/u recs from GI and ENT and give platelets prior to Trach/PEG      # elevated INR. Unclear etiology. Pt on coumadin  - Continue with daily coumadin dosing. Hold tonight   - Given Vit K and FFP3/12. FFP 3/13   - Monitor coags    #Anemia  - Likely 2/2 phlebotomy, no signs of bleeding. Will monitor given thrombocytopenia.   - Prior studies showed Anemia of chronic disease.  -s/p 1pRBC on 3/12, 3/16   - WIll keep Hb around 9-10     #ENDOCRINE   - S/p insulin drip  - c/w Lantus 12U given hypoglycemia     - Will adjust as we taper down steroids.    Monitor blood glucose and ISS coverage. pt with episodes of hypoglycemia.   - Monitor blood glucose   - A1C 8.6%      F: No IVF   E: Replete K<4 Mg<2, caution in setting of acute renal failure  N: OG tube placed, 1.5 Glucerna      Lines:  ETT (3/22), OG tube (3/21)R HD catheter (3/21)., L Midline 3/20,  Drips: Levophed     Full code  Dispo: MICU  Ppx: Coumadin (Held), PPI

## 2018-04-04 LAB
ALBUMIN SERPL ELPH-MCNC: 3.7 G/DL — SIGNIFICANT CHANGE UP (ref 3.3–5)
ALP SERPL-CCNC: 312 U/L — HIGH (ref 40–120)
ALT FLD-CCNC: 166 U/L — HIGH (ref 10–45)
ANION GAP SERPL CALC-SCNC: 12 MMOL/L — SIGNIFICANT CHANGE UP (ref 5–17)
APPEARANCE UR: (no result)
APTT BLD: 37 SEC — SIGNIFICANT CHANGE UP (ref 27.5–37.4)
AST SERPL-CCNC: 144 U/L — HIGH (ref 10–40)
BILIRUB SERPL-MCNC: 1 MG/DL — SIGNIFICANT CHANGE UP (ref 0.2–1.2)
BILIRUB UR-MCNC: (no result)
BLD GP AB SCN SERPL QL: NEGATIVE — SIGNIFICANT CHANGE UP
BUN SERPL-MCNC: 50 MG/DL — HIGH (ref 7–23)
CALCIUM SERPL-MCNC: 8.6 MG/DL — SIGNIFICANT CHANGE UP (ref 8.4–10.5)
CHLORIDE SERPL-SCNC: 97 MMOL/L — SIGNIFICANT CHANGE UP (ref 96–108)
CO2 SERPL-SCNC: 26 MMOL/L — SIGNIFICANT CHANGE UP (ref 22–31)
COLOR SPEC: (no result)
CREAT SERPL-MCNC: 2.89 MG/DL — HIGH (ref 0.5–1.3)
DIFF PNL FLD: (no result)
GLUCOSE BLDC GLUCOMTR-MCNC: 142 MG/DL — HIGH (ref 70–99)
GLUCOSE BLDC GLUCOMTR-MCNC: 149 MG/DL — HIGH (ref 70–99)
GLUCOSE BLDC GLUCOMTR-MCNC: 201 MG/DL — HIGH (ref 70–99)
GLUCOSE BLDC GLUCOMTR-MCNC: 73 MG/DL — SIGNIFICANT CHANGE UP (ref 70–99)
GLUCOSE SERPL-MCNC: 213 MG/DL — HIGH (ref 70–99)
GLUCOSE UR QL: NEGATIVE — SIGNIFICANT CHANGE UP
HCT VFR BLD CALC: 24.2 % — LOW (ref 39–50)
HCT VFR BLD CALC: 27.4 % — LOW (ref 39–50)
HGB BLD-MCNC: 7.1 G/DL — LOW (ref 13–17)
HGB BLD-MCNC: 8.6 G/DL — LOW (ref 13–17)
INR BLD: 1.52 — HIGH (ref 0.88–1.16)
INR BLD: 1.71 — HIGH (ref 0.88–1.16)
KETONES UR-MCNC: NEGATIVE — SIGNIFICANT CHANGE UP
LEUKOCYTE ESTERASE UR-ACNC: (no result)
MAGNESIUM SERPL-MCNC: 2.1 MG/DL — SIGNIFICANT CHANGE UP (ref 1.6–2.6)
MCHC RBC-ENTMCNC: 29.3 G/DL — LOW (ref 32–36)
MCHC RBC-ENTMCNC: 30.5 PG — SIGNIFICANT CHANGE UP (ref 27–34)
MCHC RBC-ENTMCNC: 31.4 G/DL — LOW (ref 32–36)
MCHC RBC-ENTMCNC: 31.9 PG — SIGNIFICANT CHANGE UP (ref 27–34)
MCV RBC AUTO: 101.5 FL — HIGH (ref 80–100)
MCV RBC AUTO: 103.9 FL — HIGH (ref 80–100)
NITRITE UR-MCNC: POSITIVE
PH UR: 8 — SIGNIFICANT CHANGE UP (ref 5–8)
PHOSPHATE SERPL-MCNC: 1.9 MG/DL — LOW (ref 2.5–4.5)
PLATELET # BLD AUTO: 132 K/UL — LOW (ref 150–400)
PLATELET # BLD AUTO: 29 K/UL — LOW (ref 150–400)
POTASSIUM SERPL-MCNC: 5.4 MMOL/L — HIGH (ref 3.5–5.3)
POTASSIUM SERPL-SCNC: 5.4 MMOL/L — HIGH (ref 3.5–5.3)
PROT SERPL-MCNC: 6.4 G/DL — SIGNIFICANT CHANGE UP (ref 6–8.3)
PROT UR-MCNC: >=300 MG/DL
PROTHROM AB SERPL-ACNC: 17 SEC — HIGH (ref 9.8–12.7)
PROTHROM AB SERPL-ACNC: 19.2 SEC — HIGH (ref 9.8–12.7)
RBC # BLD: 2.33 M/UL — LOW (ref 4.2–5.8)
RBC # BLD: 2.7 M/UL — LOW (ref 4.2–5.8)
RBC # FLD: 18.4 % — HIGH (ref 10.3–16.9)
RBC # FLD: 19.8 % — HIGH (ref 10.3–16.9)
RH IG SCN BLD-IMP: POSITIVE — SIGNIFICANT CHANGE UP
SODIUM SERPL-SCNC: 135 MMOL/L — SIGNIFICANT CHANGE UP (ref 135–145)
SP GR SPEC: 1.02 — SIGNIFICANT CHANGE UP (ref 1–1.03)
UROBILINOGEN FLD QL: 0.2 E.U./DL — SIGNIFICANT CHANGE UP
WBC # BLD: 4.7 K/UL — SIGNIFICANT CHANGE UP (ref 3.8–10.5)
WBC # BLD: 5.2 K/UL — SIGNIFICANT CHANGE UP (ref 3.8–10.5)
WBC # FLD AUTO: 4.7 K/UL — SIGNIFICANT CHANGE UP (ref 3.8–10.5)
WBC # FLD AUTO: 5.2 K/UL — SIGNIFICANT CHANGE UP (ref 3.8–10.5)

## 2018-04-04 PROCEDURE — 71045 X-RAY EXAM CHEST 1 VIEW: CPT | Mod: 26

## 2018-04-04 PROCEDURE — 99291 CRITICAL CARE FIRST HOUR: CPT

## 2018-04-04 RX ORDER — MIDAZOLAM HYDROCHLORIDE 1 MG/ML
4 INJECTION, SOLUTION INTRAMUSCULAR; INTRAVENOUS ONCE
Qty: 0 | Refills: 0 | Status: DISCONTINUED | OUTPATIENT
Start: 2018-04-04 | End: 2018-04-04

## 2018-04-04 RX ORDER — INSULIN GLARGINE 100 [IU]/ML
6 INJECTION, SOLUTION SUBCUTANEOUS ONCE
Qty: 0 | Refills: 0 | Status: COMPLETED | OUTPATIENT
Start: 2018-04-04 | End: 2018-04-04

## 2018-04-04 RX ORDER — FENTANYL CITRATE 50 UG/ML
100 INJECTION INTRAVENOUS ONCE
Qty: 0 | Refills: 0 | Status: DISCONTINUED | OUTPATIENT
Start: 2018-04-04 | End: 2018-04-04

## 2018-04-04 RX ORDER — SODIUM POLYSTYRENE SULFONATE 4.1 MEQ/G
15 POWDER, FOR SUSPENSION ORAL ONCE
Qty: 0 | Refills: 0 | Status: COMPLETED | OUTPATIENT
Start: 2018-04-04 | End: 2018-04-04

## 2018-04-04 RX ORDER — PHYTONADIONE (VIT K1) 5 MG
5 TABLET ORAL ONCE
Qty: 0 | Refills: 0 | Status: COMPLETED | OUTPATIENT
Start: 2018-04-04 | End: 2018-04-04

## 2018-04-04 RX ORDER — ALBUMIN HUMAN 25 %
50 VIAL (ML) INTRAVENOUS
Qty: 0 | Refills: 0 | Status: COMPLETED | OUTPATIENT
Start: 2018-04-04 | End: 2018-04-04

## 2018-04-04 RX ORDER — ALBUMIN HUMAN 25 %
50 VIAL (ML) INTRAVENOUS ONCE
Qty: 0 | Refills: 0 | Status: DISCONTINUED | OUTPATIENT
Start: 2018-04-04 | End: 2018-04-04

## 2018-04-04 RX ORDER — DEXTROSE 50 % IN WATER 50 %
25 SYRINGE (ML) INTRAVENOUS ONCE
Qty: 0 | Refills: 0 | Status: COMPLETED | OUTPATIENT
Start: 2018-04-04 | End: 2018-04-04

## 2018-04-04 RX ADMIN — MIDODRINE HYDROCHLORIDE 12.5 MILLIGRAM(S): 2.5 TABLET ORAL at 12:24

## 2018-04-04 RX ADMIN — MIDAZOLAM HYDROCHLORIDE 2 MILLIGRAM(S): 1 INJECTION, SOLUTION INTRAMUSCULAR; INTRAVENOUS at 17:16

## 2018-04-04 RX ADMIN — Medication 50 MILLILITER(S): at 20:18

## 2018-04-04 RX ADMIN — FENTANYL CITRATE 100 MICROGRAM(S): 50 INJECTION INTRAVENOUS at 17:17

## 2018-04-04 RX ADMIN — CHLORHEXIDINE GLUCONATE 1 APPLICATION(S): 213 SOLUTION TOPICAL at 05:59

## 2018-04-04 RX ADMIN — MIDODRINE HYDROCHLORIDE 12.5 MILLIGRAM(S): 2.5 TABLET ORAL at 01:48

## 2018-04-04 RX ADMIN — Medication 5 MILLIGRAM(S): at 23:22

## 2018-04-04 RX ADMIN — Medication 650 MILLIGRAM(S): at 23:22

## 2018-04-04 RX ADMIN — FENTANYL CITRATE 100 MICROGRAM(S): 50 INJECTION INTRAVENOUS at 17:15

## 2018-04-04 RX ADMIN — Medication 101 MILLIGRAM(S): at 10:50

## 2018-04-04 RX ADMIN — CHLORHEXIDINE GLUCONATE 15 MILLILITER(S): 213 SOLUTION TOPICAL at 12:40

## 2018-04-04 RX ADMIN — Medication 25 MILLILITER(S): at 23:21

## 2018-04-04 RX ADMIN — CHLORHEXIDINE GLUCONATE 15 MILLILITER(S): 213 SOLUTION TOPICAL at 23:22

## 2018-04-04 RX ADMIN — Medication 25 MILLIGRAM(S): at 05:59

## 2018-04-04 RX ADMIN — INSULIN GLARGINE 6 UNIT(S): 100 INJECTION, SOLUTION SUBCUTANEOUS at 23:25

## 2018-04-04 RX ADMIN — Medication 4: at 07:01

## 2018-04-04 RX ADMIN — Medication 50 MILLILITER(S): at 21:18

## 2018-04-04 RX ADMIN — MIDODRINE HYDROCHLORIDE 12.5 MILLIGRAM(S): 2.5 TABLET ORAL at 18:50

## 2018-04-04 RX ADMIN — SODIUM POLYSTYRENE SULFONATE 15 GRAM(S): 4.1 POWDER, FOR SUSPENSION ORAL at 10:50

## 2018-04-04 RX ADMIN — Medication 5 MILLIGRAM(S): at 05:59

## 2018-04-04 RX ADMIN — Medication 50 MILLILITER(S): at 19:15

## 2018-04-04 RX ADMIN — Medication 5 MILLIGRAM(S): at 14:35

## 2018-04-04 RX ADMIN — Medication 25 MILLIGRAM(S): at 18:21

## 2018-04-04 RX ADMIN — MODAFINIL 100 MILLIGRAM(S): 200 TABLET ORAL at 06:00

## 2018-04-04 NOTE — PROGRESS NOTE ADULT - SUBJECTIVE AND OBJECTIVE BOX
Patient was seen and evaluated on dialysis.   Patient is tolerating the procedure well.   HR: 71 (04-04-18 @ 19:15)  BP: 101/71 (04-04-18 @ 12:25)  Continue dialysis:   Dialyzer: Revaclear 300         QB:   300     QD: 500 2K bath   Goal UF 3kg over 3.5 Hours

## 2018-04-04 NOTE — BRIEF OPERATIVE NOTE - OPERATION/FINDINGS
Tracheotomy with placement of 6.0 cuffed Shiley trach. Renato flap.
Pulmonary/CCM Fellow Procedure Note    Procedure: Pig Tail Chest Tube Placement with U/S guidance  Indication: right sided pleural effusion  Summary:  Consent obtained and placed in chart. A time-out was completed verifying correct patient, procedure, site, positioning, and special equipment if applicable. After Identification for a suitable spot with ultrasound, the patient’s right side was prepped and draped in a sterile manner. 1% lidocaine was used to anesthetize the surrounding skin.  A finder needle was then used to locate fluid and straw coloured fluid was obtained.  A guide wire was threaded through the finder needle and the needle was removed. A 10-blade scalpel used to make the incision. The dilator was applied over the guidewire and removed after sufficient dilation. A 14F pigtail catheter was threaded over guide wire utilizing Seldinger technique. The catheter was threaded without difficulty. Guide wire and inner sheath were then removed and the pigtail was connected to a pleuravac container. A post-procedure chest x-ray was ordered to verify correct positioning of pigtail..  Estimated Blood Loss: Minimal  The patient tolerated the procedure well and there were no complications.  100 cc of yellow fluid removed for analysis

## 2018-04-04 NOTE — PROGRESS NOTE ADULT - SUBJECTIVE AND OBJECTIVE BOX
ENT Post-Operative Note    HPI: 63M s/p tracheotomy with Renato flap with placement of 6.0 cuffed Shiley trach.    Patient received 1U pRBC, 1U platelets, and 1U FFP pre-op. Patient received an additional 1U platelets intra-op.    Interval: Transferred from OR to MICU in stable condition. Stable on vent. Suction with minimal secretions.    PE:  NAD  [id number]  6.0 cuffed Shiley trach, cuff up, sutured in place, secured with soft neck tie  Duoderm under trach flanges  Trach site dry, no bleeding  Neck flat    A/P: 63M s/p tracheotomy with Renato flap with placement of 6.0 cuffed Shiley trach 4/4.  - Suction trach with red rubber catheter prn  - Replace duoderm under trach flanges as needed to prevent pressure ulcers  - ENT will plan to remove trach flange sutures POD5  - Care per MICU  - Contact ENT with any questions or concerns    Discussed with attending

## 2018-04-04 NOTE — CONSULT NOTE ADULT - ATTENDING COMMENTS
Above reviewed and agree. Patient seen and evaluated. Patient with multiple co-morbidities and requiring prolonged intubation. His care and condition with the MICU staff and it was felt that the patient is medically stable at this time to proceed with tracheostomy    - to OR today for tracheostomy  - will have units of PRBC and platelets available for use in the OR

## 2018-04-04 NOTE — BRIEF OPERATIVE NOTE - PRE-OP DX
Acute respiratory failure with hypoxia  04/04/2018    Active  Kali Melvin  Pleural effusion  03/12/2018    Active  Jeremy Davis

## 2018-04-04 NOTE — PROGRESS NOTE ADULT - SUBJECTIVE AND OBJECTIVE BOX
Patient seen and examined at bedside. Patient is a 63 year old male with a history of HFrEF 10-15% due to ischemic cardiomyopathy, s/p AICD, ?atrial fibrillation, hypertension, diabetes mellitus type 2, gout, and CKD for whom nephrology was called for GILMER from ATN requiring hemodialysis. Patient was last dialyzed 4/3 with UF of 2.2L     acetaminophen    Suspension. 650 milliGRAM(s) every 6 hours PRN  albumin human 25% IVPB 50 milliLiter(s) once  albumin human 25% IVPB 50 milliLiter(s) every 1 hour  chlorhexidine 0.12% Liquid 15 milliLiter(s) two times a day  chlorhexidine 2% Cloths 1 Application(s) daily  dextrose 5%. 1000 milliLiter(s) <Continuous>  dextrose 50% Injectable 12.5 Gram(s) once  dextrose 50% Injectable 25 Gram(s) once  dextrose 50% Injectable 25 Gram(s) once  dextrose Gel 1 Dose(s) once PRN  ergocalciferol Drops 6000 Unit(s) daily  escitalopram 5 milliGRAM(s) daily  folic acid 1 milliGRAM(s) daily  glucagon  Injectable 1 milliGRAM(s) once PRN  hydrocortisone sodium succinate Injectable 25 milliGRAM(s) every 12 hours  insulin glargine Injectable (LANTUS) 12 Unit(s) at bedtime  insulin lispro (HumaLOG) corrective regimen sliding scale   every 6 hours  LORazepam   Injectable 2 milliGRAM(s) every 4 hours PRN  metoclopramide Injectable 5 milliGRAM(s) every 8 hours  midodrine 12.5 milliGRAM(s) every 8 hours  modafinil 100 milliGRAM(s) <User Schedule>  multivitamin 1 Tablet(s) daily  norepinephrine Infusion 0.01 MICROgram(s)/kG/Min <Continuous>  pantoprazole   Suspension 40 milliGRAM(s) daily  phytonadione  IVPB 5 milliGRAM(s) once  sodium chloride 0.9% lock flush 10 milliLiter(s) every 1 hour PRN  sodium chloride 0.9% lock flush 10 milliLiter(s) every 12 hours PRN  sodium chloride 0.9% lock flush 20 milliLiter(s) once  thiamine 100 milliGRAM(s) daily    Allergies    No Known Allergies    Intolerances    T(C): , Max: 38 (04-04-18 @ 06:09)  T(F): , Max: 100.4 (04-04-18 @ 06:09)  HR: 84 (04-04-18 @ 10:00)  BP: 78/63 (04-03-18 @ 23:00)  BP(mean): 68 (04-03-18 @ 23:00)  RR: 12 (04-04-18 @ 10:00)  SpO2: 100% (04-04-18 @ 10:00)    04-03 @ 07:01  -  04-04 @ 07:00  --------------------------------------------------------  IN:    Enteral Tube Flush: 140 mL    Glucerna: 804 mL    norepinephrine Infusion: 3.4 mL  Total IN: 947.4 mL    OUT:    Other: 2200 mL  Total OUT: 2200 mL    Total NET: -1252.6 mL    LABS:                        7.1    4.7   )-----------( 29       ( 04 Apr 2018 05:53 )             24.2     04-04    135  |  97  |  50<H>  ----------------------------<  213<H>  5.4<H>   |  26  |  2.89<H>    Ca    8.6      04 Apr 2018 05:53  Phos  1.9     04-04  Mg     2.1     04-04    TPro  6.4  /  Alb  3.7  /  TBili  1.0  /  DBili  x   /  AST  144<H>  /  ALT  166<H>  /  AlkPhos  312<H>  04-04    PT/INR - ( 04 Apr 2018 05:53 )   PT: 19.2 sec;   INR: 1.71       PTT - ( 04 Apr 2018 05:53 )  PTT:37.0 sec

## 2018-04-04 NOTE — PROGRESS NOTE ADULT - ASSESSMENT
63M PMH HFrEF 10-15% (ischemic), MI, s/p AICD vs PPM, possible Afib, HTN, DM2 on insulin, possible CKD, and gout, who presents with a chief complaint of generalized weakness s/p septic shock likely 2/2 LE cellulitis. Now with AMS likely from brainstem infarct and/or toxic metabolic encephalopathy. Pending PEG/Trach     NEURO  #AMS  - Pt non responsive since 3/22. Vital signs normal during the event. Pt intubated for airway protection. Stroke code called. CT, CTA negative. VEEG showed moderate slowing but no seizure activity.   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 Per neuro, event could have resulted from hypoperfusion.   - Started on Modafinil.   - Unclear neuro prognosis at this point. Pt cannot protect airway.   - Per family discussion, pt to remain Full code and will get PEG and Trach.   -  TTE with Definity shows no clot at present       ID    # Bandemia (17%) and low grade fever (100.4)   -Blood cultures  -Continue to monitor    #septic shock 2/2 Cellulitis- Resolved  - C/w midodrine to 12.5q8 given hypotension. Titrate down as tolerated  - Titrated off levophed overnight, restart PRN. Low BP since intermittent HD was started.   - c/w solucortef 25/20 dose. ACTH stim test was borderline normal. Will keep steroids on.  -S/p zosyn for suspected UTI(3/25-3/26)  s/p Zosyn (3/11-2/21). Was previously also on Vanco (3/11-3/17).   - RUQ doppler shows no portal vein thrombosis. given worsening transaminitis and increased residuals RUQ sono was obtained which only shows cholelithiasis.   - TTE with no vegetations. Pt not stable for RUKHSANA.   - Gallium scan read showed LLE cellulitis.     - repeat LE CT does not show abscess or gas.   - R lung Effusion likely from overload. s/p thoracentesis with improvement in resp status   - HIV negative  - Vascular surgery on board. Recommending Amputation as an option if pt continues to be unstable.     #UTI- Urine cloudy appearing with increase WBC.- Resolved  - S/p zosyn 3/25-3/26    CARDIOVASCULAR   # HYPOTENSION-   -  C/w midodrine to 12.5 mg q8h to maintain SBP>65, decrease as tolerated  - Weaned off levophed overnight, may need to restart PRN   - Will keep Solucortef on given hypotension and borderline normal ACTH stimulation test.   - Gallium scan with only LLE cellulitis   - Lactate downtrended to WNL   - CHF with severely reduced EF 15%, caution with fluids. Per renal recs, can give IV albumin for fluids.    # CHF exacerbation- CHF with EF 10-15% per pt 2/2 ischemic cardiomyopathy s/p AICD. Elevated BNP on admission with R sided effusion and edema  - persistent pulmonary vascular congestion on CXR.  - Maintain negative fluid balance with dialysis, watching BP  - Unclear medical regimen opt. Per [facility] pharmacy ( and [place]) pt has not picked up Torsemide 20 or Metoprolol 100 since November.   - Hold ACE/Metoprolol in setting of sepsis/ hypotension      #AFIB- hx of Afib and LV thrombus on coumadin.    - Holding Metoprolol 12.5 q12h given hypotension. Will use Dig for rate control if RVR   - Holding coumadin given thrombocytopenia and pending PEG/ trach  -S/p heparin gtt, discontinued 2/2 thrombocytopenia concern for HIT.  -TTE with affinity shows no  LV thrombus currently       #CAD- Hx of MI s/p AICD  - Pt with pLAD in 7/2017, was started on Aspirin and Brelinta per records.  - On asp 81, consider re-instating Atorvastatin 80 (discontinued 2/2 LFT uptrend, thrombocytopenia).   - Holding aspirin and Coumadin given thrombocytopenia     # LE Edema   - LE edema with chronic venous stasis.   - Duplex does not show DVT    PULMONARY   Intubated  - Intubated on 3/22 for airway protection   - ENT consulted for trach      #Acute resp failure- Resolved   - Intubated for airway protection   - pt with R loculated plural effusion noted to have increased work of breathing. Likely 2/2 fluid overload, Fluid studies consistent with exudative effusion.   - s/p thoracentesis on 3/13 with R chest tube placement. Chest tube removed 3/15.   - s/p  pRBC transfusion on 3/12. 3/16  - Monitor resp status  - Appreciate CHF team recs regarding fluid status. Continue dialysis for fluid removal      #RENAL   #ARF- 2/2 ischemic ATN  -Unknown baseline Cr presenting with Cr 3.93 with metabolic derangements.   - Likely 2/2 Sepsis, low intravascular volume and CHF  - Trend BUN and Cr.  - Renal team on board, pt with R HD catheter placed by IR on 3/21. Switching to HD on Friday   - Pt to undergo Hd session today. Will aim to remove 3-4 L given persistent fluid overload.   - Pt hypotensive since the switch.   - CT abdomen and pelvis without acute obstruction.   - retroperitoneal ultrasound showing medical renal disease.     #Hyperkalemia   -Resolved   - PT with elevated K to 5.7 on admission,  no EKG changes   - Continue telemetry monitoring  - Trend K   - Can given Kayexalate if persists   - c/w dialysis     #Metabolic acidosis   - 2/2 Lactate, starvation ketoacidosis and uremia   - resolved       #GI   # transaminits. Abd distended, increased residuals   -  RUQ US with cholelithiasis   - Reglan for motility.   - prior CT abdomen/pelvis consistent with cholelithiasis and ascites.     #PEG   - Pt with difficulty extubating due to airway protection   - GI consulted for PEG tube placement.     #HEME  #Thrombocytopenia  - ELVIRA negative but PF4 was positive. Unlikely HIT.    - Could been related to zosyn or sepsis   - Still downtrending. Will hold coumadin and Aspirin  -. Monitor for signs of bleeding   - WIll f/u recs from GI and ENT and give platelets prior to Trach/PEG      # elevated INR. Unclear etiology. Pt on coumadin  - Continue with daily coumadin dosing. Hold tonight   - Given Vit K and FFP3/12. FFP 3/13   - Monitor coags    #Anemia  - Likely 2/2 phlebotomy, no signs of bleeding. Will monitor given thrombocytopenia.   - Prior studies showed Anemia of chronic disease.  -s/p 1pRBC on 3/12, 3/16   - WIll keep Hb around 9-10     #ENDOCRINE   - S/p insulin drip  - c/w Lantus 12U given hypoglycemia     - Will adjust as we taper down steroids.    Monitor blood glucose and ISS coverage. pt with episodes of hypoglycemia.   - Monitor blood glucose   - A1C 8.6%      F: No IVF   E: Replete K<4 Mg<2, caution in setting of acute renal failure  N: OG tube placed, 1.5 Glucerna      Lines:  ETT (3/22), OG tube (3/21)R HD catheter (3/21)., L Midline 3/20,  Drips: Levophed     Full code  Dispo: MICU  Ppx: Coumadin (Held), PPI 63M PMH HFrEF 10-15% (ischemic), MI, s/p AICD vs PPM, possible Afib, HTN, DM2 on insulin, possible CKD, and gout, who presents with a chief complaint of generalized weakness s/p septic shock likely 2/2 LE cellulitis. Now with AMS likely from brainstem infarct and/or toxic metabolic encephalopathy. Pending PEG/Trach     NEURO  #AMS  - Pt non responsive since 3/22. Vital signs normal during the event. Pt intubated for airway protection. Stroke code called. CT, CTA negative. VEEG showed moderate slowing but no seizure activity.   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 Per neuro, event could have resulted from hypoperfusion.   - Started on Modafinil.   - Unclear neuro prognosis at this point. Pt cannot protect airway.   - Per family discussion, pt to remain Full code and will get PEG and Trach.   -  TTE with Definity shows no clot at present       ID    # Bandemia (17%) and low grade fever (100.4)   -Blood cultures  -Continue to monitor    #septic shock 2/2 Cellulitis- Resolved  - C/w midodrine to 12.5q8 given hypotension. Titrate down as tolerated  - Titrated off levophed overnight, restart PRN. Low BP since intermittent HD was started.   - c/w solucortef 25/20 dose. ACTH stim test was borderline normal. Will keep steroids on.  -S/p zosyn for suspected UTI(3/25-3/26)  s/p Zosyn (3/11-2/21). Was previously also on Vanco (3/11-3/17).   - RUQ doppler shows no portal vein thrombosis. given worsening transaminitis and increased residuals RUQ sono was obtained which only shows cholelithiasis.   - TTE with no vegetations. Pt not stable for RUKHSANA.   - Gallium scan read showed LLE cellulitis.     - repeat LE CT does not show abscess or gas.   - R lung Effusion likely from overload. s/p thoracentesis with improvement in resp status   - HIV negative  - Vascular surgery on board. Recommending Amputation as an option if pt continues to be unstable.     #UTI- Urine cloudy appearing with increase WBC.- Resolved  - S/p zosyn 3/25-3/26    CARDIOVASCULAR   # HYPOTENSION-   -  C/w midodrine to 12.5 mg q8h to maintain SBP>65, decrease as tolerated  - Weaned off levophed overnight, may need to restart PRN   - Will keep Solucortef on given hypotension and borderline normal ACTH stimulation test.   - Gallium scan with only LLE cellulitis   - Lactate downtrended to WNL   - CHF with severely reduced EF 15%, caution with fluids. Per renal recs, can give IV albumin for fluids.    # CHF exacerbation- CHF with EF 10-15% per pt 2/2 ischemic cardiomyopathy s/p AICD. Elevated BNP on admission with R sided effusion and edema  - persistent pulmonary vascular congestion on CXR.  - Maintain negative fluid balance with dialysis, watching BP  - Unclear medical regimen opt. Per [facility] pharmacy ( and [place]) pt has not picked up Torsemide 20 or Metoprolol 100 since November.   - Hold ACE/Metoprolol in setting of sepsis/ hypotension      #AFIB- hx of Afib and LV thrombus on coumadin.    - Holding Metoprolol 12.5 q12h given hypotension. Will use Dig for rate control if RVR   - Holding coumadin given thrombocytopenia and pending PEG/ trach  -S/p heparin gtt, discontinued 2/2 thrombocytopenia concern for HIT.  -TTE with affinity shows no  LV thrombus currently       #CAD- Hx of MI s/p AICD  - Pt with pLAD in 7/2017, was started on Aspirin and Brelinta per records.  -Holding asp 81, Coumadin and atorvastatin (discontinued 2/2 LFT uptrend, thrombocytopenia).       # LE Edema   - LE edema with chronic venous stasis.   - Duplex does not show DVT    PULMONARY   Intubated  - Intubated on 3/22 for airway protection   - ENT consulted for trach, plan for trach today    #Acute resp failure- Resolved   - Intubated for airway protection   - pt with R loculated plural effusion noted to have increased work of breathing. Likely 2/2 fluid overload, Fluid studies consistent with exudative effusion.   - s/p thoracentesis on 3/13 with R chest tube placement. Chest tube removed 3/15.   - Monitor resp status  - Appreciate CHF team recs regarding fluid status. Continue dialysis for fluid removal      #RENAL   #ARF- 2/2 ischemic ATN. Unknown baseline Cr presenting with Cr 3.93 with metabolic derangements. Likely 2/2 Sepsis, low intravascular volume and CHF. Renal team on board, pt with R HD catheter placed by IR on 3/21. Now on intermittent HD.   - Trend BUN and Cr.  -C/w intermittent HD per renal recs  - F/u renal recs daily  - CT abdomen and pelvis without acute obstruction.   - retroperitoneal ultrasound showing medical renal disease.     #Hyperkalemia- PT with elevated K to 5.7 on admission,  no EKG changes. Was resolved, K again uptrending to 5.4 today.   - Kayexalate given prior to trach/PEG  - Continue telemetry monitoring  - Trend K   - c/w dialysis     #Metabolic acidosis   - 2/2 Lactate, starvation ketoacidosis and uremia   - resolved       #GI   # transaminitis   -  RUQ US with cholelithiasis   - Reglan for motility.   - prior CT abdomen/pelvis consistent with cholelithiasis and ascites.     #PEG   - Pt with difficulty extubating due to airway protection   - GI consulted for PEG tube placement, plan for PEG today    #HEME  #Thrombocytopenia  - ELVIRA negative but PF4 was positive. Unlikely HIT.    - Could been related to zosyn or sepsis   - Still downtrending. Will hold coumadin and Aspirin  -. Monitor for signs of bleeding   - Plts and FFP given prior to Trach/PEG      # elevated INR. Unclear etiology. Pt on coumadin  - Holding coumadin for peg/ trach placement  - Given Vit K and FFP3/12. FFP 3/13   - Monitor coags    #Anemia- Likely 2/2 phlebotomy, no signs of bleeding. Prior studies showed Anemia of chronic disease.  -Hgb 7.1 today, will give 1 U PRBCs  -s/p 1pRBC on 3/12, 3/16   - WIll keep Hb around 9-10     #ENDOCRINE   - S/p insulin drip  - c/w Lantus 12U given hypoglycemia     - Will adjust as we taper down steroids.    Monitor blood glucose and ISS coverage. pt with episodes of hypoglycemia.   - Monitor blood glucose   - A1C 8.6%      F: No IVF   E: Replete K<4 Mg<2, caution in setting of acute renal failure  N: OG tube placed, 1.5 Glucerna      Lines:  ETT (3/22), OG tube (3/21)R HD catheter (3/21)., L Midline 3/20,  Drips: Levophed     Full code  Dispo: MICU  Ppx: Coumadin (Held), PPI 63M PMH HFrEF 10-15% (ischemic), MI, s/p AICD vs PPM, possible Afib, HTN, DM2 on insulin, possible CKD, and gout, who presents with a chief complaint of generalized weakness s/p septic shock likely 2/2 LE cellulitis. Now with AMS likely from brainstem infarct and/or toxic metabolic encephalopathy. Pending PEG/Trach     NEURO  #AMS  - Pt non responsive since 3/22. Vital signs normal during the event. Pt intubated for airway protection. Stroke code called. CT, CTA negative. VEEG showed moderate slowing but no seizure activity.   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 Per neuro, event could have resulted from hypoperfusion.   - Started on Modafinil.   - Unclear neuro prognosis at this point. Pt cannot protect airway.   - Per family discussion, pt to remain Full code and will get PEG and Trach.   -  TTE with Definity shows no clot at present       ID    # Bandemia (17%) and low grade fever (100.4)   -Blood cultures  -Continue to monitor    #septic shock 2/2 Cellulitis- Resolved  - C/w midodrine to 12.5q8 given hypotension. Titrate down as tolerated  - Titrated off levophed overnight, restart PRN. Low BP since intermittent HD was started.   - c/w solucortef 25/20 dose. ACTH stim test was borderline normal. Will keep steroids on.  -S/p zosyn for suspected UTI(3/25-3/26)  s/p Zosyn (3/11-2/21). Was previously also on Vanco (3/11-3/17).   - RUQ doppler shows no portal vein thrombosis. given worsening transaminitis and increased residuals RUQ sono was obtained which only shows cholelithiasis.   - TTE with no vegetations. Pt not stable for RUKHSANA.   - Gallium scan read showed LLE cellulitis.     - repeat LE CT does not show abscess or gas.   - R lung Effusion likely from overload. s/p thoracentesis with improvement in resp status   - HIV negative  - Vascular surgery on board. Recommending Amputation as an option if pt continues to be unstable.     #UTI- Urine cloudy appearing with increase WBC.- Resolved  - S/p zosyn 3/25-3/26    CARDIOVASCULAR   # HYPOTENSION-   -  C/w midodrine to 12.5 mg q8h to maintain SBP>65, decrease as tolerated  - Weaned off levophed overnight, may need to restart PRN   - Will keep Solucortef on given hypotension and borderline normal ACTH stimulation test.   - Gallium scan with only LLE cellulitis   - Lactate downtrended to WNL   - CHF with severely reduced EF 15%, caution with fluids. Per renal recs, can give IV albumin for fluids.    # CHF exacerbation- CHF with EF 10-15% per pt 2/2 ischemic cardiomyopathy s/p AICD. Elevated BNP on admission with R sided effusion and edema  - persistent pulmonary vascular congestion on CXR.  - Maintain negative fluid balance with dialysis, watching BP  - Unclear medical regimen opt. Per [facility] pharmacy ( and [place]) pt has not picked up Torsemide 20 or Metoprolol 100 since November.   - Hold ACE/Metoprolol in setting of sepsis/ hypotension      #AFIB- hx of Afib and LV thrombus on coumadin.    - Holding Metoprolol 12.5 q12h given hypotension. Will use Dig for rate control if RVR   - Holding coumadin given thrombocytopenia and pending PEG/ trach  -S/p heparin gtt, discontinued 2/2 thrombocytopenia concern for HIT.  -TTE with affinity shows no  LV thrombus currently       #CAD- Hx of MI s/p AICD  - Pt with pLAD in 7/2017, was started on Aspirin and Brelinta per records.  -Holding asp 81, Coumadin and atorvastatin (discontinued 2/2 LFT uptrend, thrombocytopenia).       # LE Edema   - LE edema with chronic venous stasis.   - Duplex does not show DVT    PULMONARY   Intubated  - Intubated on 3/22 for airway protection   - ENT consulted for trach, plan for trach today    #Acute resp failure- Resolved   - Intubated for airway protection   - pt with R loculated plural effusion noted to have increased work of breathing. Likely 2/2 fluid overload, Fluid studies consistent with exudative effusion.   - s/p thoracentesis on 3/13 with R chest tube placement. Chest tube removed 3/15.   - Monitor resp status  - Appreciate CHF team recs regarding fluid status. Continue dialysis for fluid removal      #RENAL   #ARF- 2/2 ischemic ATN. Unknown baseline Cr presenting with Cr 3.93 with metabolic derangements. Likely 2/2 Sepsis, low intravascular volume and CHF. Renal team on board, pt with R HD catheter placed by IR on 3/21. Now on intermittent HD.   - Trend BUN and Cr.  -C/w intermittent HD per renal recs  - F/u renal recs daily  - CT abdomen and pelvis without acute obstruction.   - retroperitoneal ultrasound showing medical renal disease.     #Hyperkalemia- PT with elevated K to 5.7 on admission,  no EKG changes. Was resolved, K again uptrending to 5.4 today.   - Kayexalate given prior to trach/PEG  - Continue telemetry monitoring  - Trend K   - c/w dialysis     #Metabolic acidosis   - 2/2 Lactate, starvation ketoacidosis and uremia   - resolved       #GI   # transaminitis   -  RUQ US with cholelithiasis   - Reglan for motility.   - prior CT abdomen/pelvis consistent with cholelithiasis and ascites.     #PEG   - Pt with difficulty extubating due to airway protection   - GI consulted for PEG tube placement, plan for PEG today    #HEME  #Thrombocytopenia  - ELVIRA negative but PF4 was positive. Unlikely HIT.    - Could been related to zosyn or sepsis   - Still downtrending. Will hold coumadin and Aspirin  -. Monitor for signs of bleeding   - Plts and FFP given prior to Trach/PEG      # elevated INR. Unclear etiology. Pt on coumadin  - Holding coumadin for peg/ trach placement  - Given Vit K and FFP3/12. FFP 3/13   - Monitor coags    #Anemia- Likely 2/2 phlebotomy, no signs of bleeding. Prior studies showed Anemia of chronic disease.  -Hgb 7.1 today, will give 1 U PRBCs  -s/p 1pRBC on 3/12, 3/16   - WIll keep Hb around 9-10     #ENDOCRINE   - S/p insulin drip  - c/w Lantus 12U given hypoglycemia     - Will adjust as we taper down steroids.    Monitor blood glucose and ISS coverage. pt with episodes of hypoglycemia.   - Monitor blood glucose   - A1C 8.6%      F: No IVF   E: Replete K<4 Mg<2, caution in setting of acute renal failure  N: OG tube placed, 1.5 Glucerna      Lines:  ETT (3/22), OG tube (3/21)R HD catheter (3/21)., L Midline 3/20,  Drips: Levophed     Full code  Dispo: MICU  Ppx: Coumadin (Held), PPI    Critical care time rendered 40 minutes

## 2018-04-04 NOTE — CONSULT NOTE ADULT - PROBLEM SELECTOR PROBLEM 1
GILMER (acute kidney injury)
Advanced care planning/counseling discussion
Debilitated patient
Severe sepsis

## 2018-04-04 NOTE — PROGRESS NOTE ADULT - PROBLEM SELECTOR PLAN 1
Patient is a 63  year old male with acute on chronic renal failure from ischemic ATN. Patient is currently requiring hemodialysis. Patient was last dialyzed 4/3 with UF of 2.2L     P - dialysis today for clearance and UF   Revaclear 300, , , 2k bath   Goal UF will start at 2kg over 3 hours  Now off levophed   Albumin qhrly

## 2018-04-04 NOTE — BRIEF OPERATIVE NOTE - PROCEDURE
<<-----Click on this checkbox to enter Procedure Tracheotomy in adult  04/04/2018    Active  ANNETTEERS

## 2018-04-04 NOTE — CONSULT NOTE ADULT - SUBJECTIVE AND OBJECTIVE BOX
ENT Consult Note    HPI: 63M w/ MI 7/2017 c/b CHF (EF <15%), PVD, atrial fibrillation on home Coumadin, also IDDM, admitted to [facility] for management of Left lower extremity cellulitis. Hospital course complicated by septic shock and cardiogenic shock requiring pressors and dialysis. Patient also with thrombocytopenia, elevated INR, and low H/H. On 3/22 patient intubated in the setting of altered mental status for airway protection, likely CVA. Patient continues to have poor mental status with need for ETT and ventilator support. Unable to be weaned off ventilator/ETT safely. ENT consulted for evaluation for tracheotomy.     PE:  Altered mental status, A&Ox0, responsive to stimuli  Endotracheal tube, ventilator PS 8/5, FiO2 40%  Tolerates lying flat  Mild short neck, thin, palpable landmarks  No high riding innominate appreciated    Labs 4/4:  Hb 7.1, Plt 29, INR 1.71, PTT 37    A/P: 63M with EF <15%, hospital course with septic shock, cardiogenic shock, on dialysis with likely CVA resulting in reduced mental status and need for ventilator support. Unable to be weaned off ventilator/ETT. Plan for tracheotomy.  - Tracheotomy procedure discussed at length with Health Care Proxy Cristal Castro ([phone]) and consent obtained. All r/b/a discussed at length. Daughter aware that do to patient's poor medical health, there are significantly elevated risks including but not limited to risk of death, stroke, significant cardiac incident. Daughter wishes to proceed with procedure in order to optimize patient comfort and potential long term outlook.  - Discussed with MICU team regarding medical optimization including low H/H, thrombocytopenia, and elevated INR.  - Will try to arrange for tracheotomy procedure as soon as possible. Will update MICU once definitive timing arranged  - Contact ENT with any questions or concerns    Discussed with attending ENT Consult Note    HPI: 63M w/ MI 7/2017 c/b CHF (EF <15%), PVD, atrial fibrillation on home Coumadin, also IDDM, admitted to [facility] for management of Left lower extremity cellulitis. Hospital course complicated by septic shock and cardiogenic shock requiring pressors and dialysis. Patient also with thrombocytopenia, elevated INR, and low H/H. On 3/22 patient intubated in the setting of altered mental status for airway protection, likely CVA. Patient continues to have poor mental status with need for ETT and ventilator support. Unable to be weaned off ventilator/ETT safely. ENT consulted for evaluation for tracheotomy.     PE:  Altered mental status, A&Ox0, responsive to stimuli  Endotracheal tube, ventilator PS 8/5, FiO2 40%  Tolerates lying flat  Mild short neck, thin, palpable landmarks  No high riding innominate appreciated    Labs 4/4:  Hb 7.1, Plt 29, INR 1.71, PTT 37    A/P: 63M with EF <15%, hospital course with septic shock, cardiogenic shock, on dialysis with likely CVA resulting in reduced mental status and need for ventilator support. Unable to be weaned off ventilator/ETT. Plan for tracheotomy.  - Tracheotomy procedure discussed at length with Health Care Proxy Cristal Castro ([phone]) and consent obtained. All r/b/a discussed at length. Daughter aware that due to patient's poor medical health, there are significantly elevated risks including but not limited to risk of death, stroke, significant cardiac incident. Daughter wishes to proceed with procedure in order to optimize patient comfort and potential long term outlook.  - Discussed with MICU team regarding medical optimization including low H/H, thrombocytopenia, and elevated INR.  - Will try to arrange for tracheotomy procedure as soon as possible. Will update MICU once definitive timing arranged  - Contact ENT with any questions or concerns    Discussed with attending

## 2018-04-04 NOTE — PROGRESS NOTE ADULT - SUBJECTIVE AND OBJECTIVE BOX
INTERVAL HPI/OVERNIGHT EVENTS: Feeds held. CBC with 17% bands and febrile to 100.4, Titrated off levophed.     SUBJECTIVE: Patient seen and examined at bedside. Non-verbal. Does not open eyes to voice or sternal rub.     OBJECTIVE:    VITAL SIGNS:  ICU Vital Signs Last 24 Hrs  T(C): 37.5 (04 Apr 2018 10:23), Max: 38 (04 Apr 2018 06:09)  T(F): 99.5 (04 Apr 2018 10:23), Max: 100.4 (04 Apr 2018 06:09)  HR: 84 (04 Apr 2018 10:00) (78 - 96)  BP: 78/63 (03 Apr 2018 23:00) (76/64 - 87/72)  BP(mean): 68 (03 Apr 2018 23:00) (68 - 80)  ABP: 94/62 (04 Apr 2018 10:00) (82/52 - 104/68)  ABP(mean): 74 (04 Apr 2018 10:00) (64 - 80)  RR: 12 (04 Apr 2018 10:00) (7 - 22)  SpO2: 100% (04 Apr 2018 10:00) (100% - 100%)    Mode: AC/ CMV (Assist Control/ Continuous Mandatory Ventilation), RR (machine): 10, TV (machine): 500, FiO2: 40, PEEP: 5, ITime: 0.9, MAP: 8, PIP: 21    04-03 @ 07:01  -  04-04 @ 07:00  --------------------------------------------------------  IN: 947.4 mL / OUT: 2200 mL / NET: -1252.6 mL      CAPILLARY BLOOD GLUCOSE      POCT Blood Glucose.: 149 mg/dL (04 Apr 2018 11:55)      PHYSICAL EXAM:    General: Intubated, not sedated, appears comfortable.   HEENT: NC/AT; PERRL, clear conjunctiva  Neck: supple, R HD catheter  Respiratory: CTA b/l  Cardiovascular: +S1/S2; RRR  Abdomen: soft, non-distended.   Extremities: WWP, 1+ LE edema b/l  Skin: Chronic venous stasis changes of b/l LE  Neurological: Does not withdraw to pain or follow commands.     MEDICATIONS:  MEDICATIONS  (STANDING):  albumin human 25% IVPB 50 milliLiter(s) IV Intermittent once  albumin human 25% IVPB 50 milliLiter(s) IV Intermittent every 1 hour  chlorhexidine 0.12% Liquid 15 milliLiter(s) Swish and Spit two times a day  chlorhexidine 2% Cloths 1 Application(s) Topical daily  dextrose 5%. 1000 milliLiter(s) (50 mL/Hr) IV Continuous <Continuous>  dextrose 50% Injectable 12.5 Gram(s) IV Push once  dextrose 50% Injectable 25 Gram(s) IV Push once  dextrose 50% Injectable 25 Gram(s) IV Push once  ergocalciferol Drops 6000 Unit(s) Oral daily  escitalopram 5 milliGRAM(s) Oral daily  folic acid 1 milliGRAM(s) Oral daily  hydrocortisone sodium succinate Injectable 25 milliGRAM(s) IV Push every 12 hours  insulin glargine Injectable (LANTUS) 12 Unit(s) SubCutaneous at bedtime  insulin lispro (HumaLOG) corrective regimen sliding scale   SubCutaneous every 6 hours  metoclopramide Injectable 5 milliGRAM(s) IV Push every 8 hours  midodrine 12.5 milliGRAM(s) Oral every 8 hours  modafinil 100 milliGRAM(s) Oral <User Schedule>  multivitamin 1 Tablet(s) Oral daily  norepinephrine Infusion 0.01 MICROgram(s)/kG/Min (1.5 mL/Hr) IV Continuous <Continuous>  pantoprazole   Suspension 40 milliGRAM(s) Oral daily  sodium chloride 0.9% lock flush 20 milliLiter(s) IV Push once  thiamine 100 milliGRAM(s) Oral daily    MEDICATIONS  (PRN):  acetaminophen    Suspension. 650 milliGRAM(s) Oral every 6 hours PRN Moderate Pain (4 - 6)  dextrose Gel 1 Dose(s) Oral once PRN Blood Glucose LESS THAN 70 milliGRAM(s)/deciliter  glucagon  Injectable 1 milliGRAM(s) IntraMuscular once PRN Glucose LESS THAN 70 milligrams/deciliter  LORazepam   Injectable 2 milliGRAM(s) IV Push every 4 hours PRN Anxiety  sodium chloride 0.9% lock flush 10 milliLiter(s) IV Push every 1 hour PRN After each medication administration  sodium chloride 0.9% lock flush 10 milliLiter(s) IV Push every 12 hours PRN Lumen of catheter NOT used      ALLERGIES:  Allergies    No Known Allergies    Intolerances        LABS:                        7.1    4.7   )-----------( 29       ( 04 Apr 2018 05:53 )             24.2     04-04    135  |  97  |  50<H>  ----------------------------<  213<H>  5.4<H>   |  26  |  2.89<H>    Ca    8.6      04 Apr 2018 05:53  Phos  1.9     04-04  Mg     2.1     04-04    TPro  6.4  /  Alb  3.7  /  TBili  1.0  /  DBili  x   /  AST  144<H>  /  ALT  166<H>  /  AlkPhos  312<H>  04-04    PT/INR - ( 04 Apr 2018 05:53 )   PT: 19.2 sec;   INR: 1.71          PTT - ( 04 Apr 2018 05:53 )  PTT:37.0 sec      Radiology:     CXR: Bilateral pulmonary vascular congestion. INTERVAL HPI/OVERNIGHT EVENTS: Feeds held. CBC with 17% bands and febrile to 100.4, Titrated off levophed.     SUBJECTIVE: Patient seen and examined at bedside. Non-verbal. Does not open eyes to voice or sternal rub. ROS not obtainable    OBJECTIVE:    VITAL SIGNS:  ICU Vital Signs Last 24 Hrs  T(C): 37.5 (04 Apr 2018 10:23), Max: 38 (04 Apr 2018 06:09)  T(F): 99.5 (04 Apr 2018 10:23), Max: 100.4 (04 Apr 2018 06:09)  HR: 84 (04 Apr 2018 10:00) (78 - 96)  BP: 78/63 (03 Apr 2018 23:00) (76/64 - 87/72)  BP(mean): 68 (03 Apr 2018 23:00) (68 - 80)  ABP: 94/62 (04 Apr 2018 10:00) (82/52 - 104/68)  ABP(mean): 74 (04 Apr 2018 10:00) (64 - 80)  RR: 12 (04 Apr 2018 10:00) (7 - 22)  SpO2: 100% (04 Apr 2018 10:00) (100% - 100%)    Mode: AC/ CMV (Assist Control/ Continuous Mandatory Ventilation), RR (machine): 10, TV (machine): 500, FiO2: 40, PEEP: 5, ITime: 0.9, MAP: 8, PIP: 21    04-03 @ 07:01  -  04-04 @ 07:00  --------------------------------------------------------  IN: 947.4 mL / OUT: 2200 mL / NET: -1252.6 mL      CAPILLARY BLOOD GLUCOSE      POCT Blood Glucose.: 149 mg/dL (04 Apr 2018 11:55)      PHYSICAL EXAM:    General: Intubated, not sedated, appears comfortable.   HEENT: NC/AT; PERRL, clear conjunctiva  Neck: supple, R HD catheter  Respiratory: CTA b/l  Cardiovascular: +S1/S2; RRR  Abdomen: soft, non-distended.   Extremities: WWP, 1+ LE edema b/l  Skin: Chronic venous stasis changes of b/l LE  Neurological: Does not withdraw to pain or follow commands.     MEDICATIONS:  MEDICATIONS  (STANDING):  albumin human 25% IVPB 50 milliLiter(s) IV Intermittent once  albumin human 25% IVPB 50 milliLiter(s) IV Intermittent every 1 hour  chlorhexidine 0.12% Liquid 15 milliLiter(s) Swish and Spit two times a day  chlorhexidine 2% Cloths 1 Application(s) Topical daily  dextrose 5%. 1000 milliLiter(s) (50 mL/Hr) IV Continuous <Continuous>  dextrose 50% Injectable 12.5 Gram(s) IV Push once  dextrose 50% Injectable 25 Gram(s) IV Push once  dextrose 50% Injectable 25 Gram(s) IV Push once  ergocalciferol Drops 6000 Unit(s) Oral daily  escitalopram 5 milliGRAM(s) Oral daily  folic acid 1 milliGRAM(s) Oral daily  hydrocortisone sodium succinate Injectable 25 milliGRAM(s) IV Push every 12 hours  insulin glargine Injectable (LANTUS) 12 Unit(s) SubCutaneous at bedtime  insulin lispro (HumaLOG) corrective regimen sliding scale   SubCutaneous every 6 hours  metoclopramide Injectable 5 milliGRAM(s) IV Push every 8 hours  midodrine 12.5 milliGRAM(s) Oral every 8 hours  modafinil 100 milliGRAM(s) Oral <User Schedule>  multivitamin 1 Tablet(s) Oral daily  norepinephrine Infusion 0.01 MICROgram(s)/kG/Min (1.5 mL/Hr) IV Continuous <Continuous>  pantoprazole   Suspension 40 milliGRAM(s) Oral daily  sodium chloride 0.9% lock flush 20 milliLiter(s) IV Push once  thiamine 100 milliGRAM(s) Oral daily    MEDICATIONS  (PRN):  acetaminophen    Suspension. 650 milliGRAM(s) Oral every 6 hours PRN Moderate Pain (4 - 6)  dextrose Gel 1 Dose(s) Oral once PRN Blood Glucose LESS THAN 70 milliGRAM(s)/deciliter  glucagon  Injectable 1 milliGRAM(s) IntraMuscular once PRN Glucose LESS THAN 70 milligrams/deciliter  LORazepam   Injectable 2 milliGRAM(s) IV Push every 4 hours PRN Anxiety  sodium chloride 0.9% lock flush 10 milliLiter(s) IV Push every 1 hour PRN After each medication administration  sodium chloride 0.9% lock flush 10 milliLiter(s) IV Push every 12 hours PRN Lumen of catheter NOT used      ALLERGIES:  Allergies    No Known Allergies    Intolerances        LABS:                        7.1    4.7   )-----------( 29       ( 04 Apr 2018 05:53 )             24.2     04-04    135  |  97  |  50<H>  ----------------------------<  213<H>  5.4<H>   |  26  |  2.89<H>    Ca    8.6      04 Apr 2018 05:53  Phos  1.9     04-04  Mg     2.1     04-04    TPro  6.4  /  Alb  3.7  /  TBili  1.0  /  DBili  x   /  AST  144<H>  /  ALT  166<H>  /  AlkPhos  312<H>  04-04    PT/INR - ( 04 Apr 2018 05:53 )   PT: 19.2 sec;   INR: 1.71          PTT - ( 04 Apr 2018 05:53 )  PTT:37.0 sec      Radiology:     CXR: Bilateral pulmonary vascular congestion.

## 2018-04-05 DIAGNOSIS — R53.2 FUNCTIONAL QUADRIPLEGIA: ICD-10-CM

## 2018-04-05 LAB
ALBUMIN SERPL ELPH-MCNC: 3.8 G/DL — SIGNIFICANT CHANGE UP (ref 3.3–5)
ALP SERPL-CCNC: 317 U/L — HIGH (ref 40–120)
ALT FLD-CCNC: 167 U/L — HIGH (ref 10–45)
ANION GAP SERPL CALC-SCNC: 15 MMOL/L — SIGNIFICANT CHANGE UP (ref 5–17)
ANISOCYTOSIS BLD QL: SIGNIFICANT CHANGE UP
AST SERPL-CCNC: 130 U/L — HIGH (ref 10–40)
BILIRUB SERPL-MCNC: 2.1 MG/DL — HIGH (ref 0.2–1.2)
BUN SERPL-MCNC: 36 MG/DL — HIGH (ref 7–23)
CALCIUM SERPL-MCNC: 8.8 MG/DL — SIGNIFICANT CHANGE UP (ref 8.4–10.5)
CHLORIDE SERPL-SCNC: 97 MMOL/L — SIGNIFICANT CHANGE UP (ref 96–108)
CO2 SERPL-SCNC: 27 MMOL/L — SIGNIFICANT CHANGE UP (ref 22–31)
CREAT SERPL-MCNC: 2.4 MG/DL — HIGH (ref 0.5–1.3)
GIANT PLATELETS BLD QL SMEAR: PRESENT — SIGNIFICANT CHANGE UP
GLUCOSE BLDC GLUCOMTR-MCNC: 113 MG/DL — HIGH (ref 70–99)
GLUCOSE BLDC GLUCOMTR-MCNC: 138 MG/DL — HIGH (ref 70–99)
GLUCOSE BLDC GLUCOMTR-MCNC: 146 MG/DL — HIGH (ref 70–99)
GLUCOSE BLDC GLUCOMTR-MCNC: 201 MG/DL — HIGH (ref 70–99)
GLUCOSE BLDC GLUCOMTR-MCNC: 78 MG/DL — SIGNIFICANT CHANGE UP (ref 70–99)
GLUCOSE SERPL-MCNC: 117 MG/DL — HIGH (ref 70–99)
HCT VFR BLD CALC: 26 % — LOW (ref 39–50)
HGB BLD-MCNC: 8 G/DL — LOW (ref 13–17)
HYPOCHROMIA BLD QL: SLIGHT — SIGNIFICANT CHANGE UP
INR BLD: 1.4 — HIGH (ref 0.88–1.16)
LG PLATELETS BLD QL AUTO: PRESENT — SIGNIFICANT CHANGE UP
LYMPHOCYTES # BLD AUTO: 13 % — SIGNIFICANT CHANGE UP (ref 13–44)
MACROCYTES BLD QL: SIGNIFICANT CHANGE UP
MAGNESIUM SERPL-MCNC: 2 MG/DL — SIGNIFICANT CHANGE UP (ref 1.6–2.6)
MANUAL DIF COMMENT BLD-IMP: SIGNIFICANT CHANGE UP
MANUAL SMEAR VERIFICATION: SIGNIFICANT CHANGE UP
MCHC RBC-ENTMCNC: 30.8 G/DL — LOW (ref 32–36)
MCHC RBC-ENTMCNC: 31.1 PG — SIGNIFICANT CHANGE UP (ref 27–34)
MCV RBC AUTO: 101.2 FL — HIGH (ref 80–100)
METAMYELOCYTES # FLD: 1 % — HIGH
MICROCYTES BLD QL: SLIGHT — SIGNIFICANT CHANGE UP
MONOCYTES NFR BLD AUTO: 11 % — SIGNIFICANT CHANGE UP (ref 2–14)
NEUTROPHILS NFR BLD AUTO: 42 % — LOW (ref 43–77)
NEUTS BAND # BLD: 33 % — HIGH
PHOSPHATE SERPL-MCNC: 2.5 MG/DL — SIGNIFICANT CHANGE UP (ref 2.5–4.5)
PLAT MORPH BLD: (no result)
PLATELET # BLD AUTO: 89 K/UL — LOW (ref 150–400)
POIKILOCYTOSIS BLD QL AUTO: SLIGHT — SIGNIFICANT CHANGE UP
POLYCHROMASIA BLD QL SMEAR: SLIGHT — SIGNIFICANT CHANGE UP
POTASSIUM SERPL-MCNC: 4.5 MMOL/L — SIGNIFICANT CHANGE UP (ref 3.5–5.3)
POTASSIUM SERPL-SCNC: 4.5 MMOL/L — SIGNIFICANT CHANGE UP (ref 3.5–5.3)
PROT SERPL-MCNC: 6.6 G/DL — SIGNIFICANT CHANGE UP (ref 6–8.3)
PROTHROM AB SERPL-ACNC: 15.6 SEC — HIGH (ref 9.8–12.7)
RBC # BLD: 2.57 M/UL — LOW (ref 4.2–5.8)
RBC # FLD: 19.3 % — HIGH (ref 10.3–16.9)
RBC BLD AUTO: (no result)
SODIUM SERPL-SCNC: 139 MMOL/L — SIGNIFICANT CHANGE UP (ref 135–145)
SPHEROCYTES BLD QL SMEAR: SLIGHT — SIGNIFICANT CHANGE UP
TARGETS BLD QL SMEAR: SLIGHT — SIGNIFICANT CHANGE UP
WBC # BLD: 4.1 K/UL — SIGNIFICANT CHANGE UP (ref 3.8–10.5)
WBC # FLD AUTO: 4.1 K/UL — SIGNIFICANT CHANGE UP (ref 3.8–10.5)

## 2018-04-05 PROCEDURE — 71045 X-RAY EXAM CHEST 1 VIEW: CPT | Mod: 26

## 2018-04-05 PROCEDURE — 99233 SBSQ HOSP IP/OBS HIGH 50: CPT

## 2018-04-05 PROCEDURE — 99233 SBSQ HOSP IP/OBS HIGH 50: CPT | Mod: GC

## 2018-04-05 RX ORDER — INSULIN GLARGINE 100 [IU]/ML
5 INJECTION, SOLUTION SUBCUTANEOUS AT BEDTIME
Qty: 0 | Refills: 0 | Status: DISCONTINUED | OUTPATIENT
Start: 2018-04-05 | End: 2018-04-06

## 2018-04-05 RX ORDER — ALBUMIN HUMAN 25 %
50 VIAL (ML) INTRAVENOUS ONCE
Qty: 0 | Refills: 0 | Status: DISCONTINUED | OUTPATIENT
Start: 2018-04-05 | End: 2018-04-05

## 2018-04-05 RX ORDER — ALBUMIN HUMAN 25 %
50 VIAL (ML) INTRAVENOUS
Qty: 0 | Refills: 0 | Status: COMPLETED | OUTPATIENT
Start: 2018-04-05 | End: 2018-04-05

## 2018-04-05 RX ORDER — DEXTROSE 50 % IN WATER 50 %
25 SYRINGE (ML) INTRAVENOUS ONCE
Qty: 0 | Refills: 0 | Status: COMPLETED | OUTPATIENT
Start: 2018-04-05 | End: 2018-04-05

## 2018-04-05 RX ADMIN — MIDODRINE HYDROCHLORIDE 12.5 MILLIGRAM(S): 2.5 TABLET ORAL at 19:24

## 2018-04-05 RX ADMIN — Medication 5 MILLIGRAM(S): at 07:25

## 2018-04-05 RX ADMIN — Medication 25 MILLIGRAM(S): at 19:21

## 2018-04-05 RX ADMIN — MIDODRINE HYDROCHLORIDE 12.5 MILLIGRAM(S): 2.5 TABLET ORAL at 03:55

## 2018-04-05 RX ADMIN — MIDODRINE HYDROCHLORIDE 12.5 MILLIGRAM(S): 2.5 TABLET ORAL at 10:39

## 2018-04-05 RX ADMIN — Medication 50 MILLILITER(S): at 17:49

## 2018-04-05 RX ADMIN — Medication 25 MILLIGRAM(S): at 07:26

## 2018-04-05 RX ADMIN — Medication 4: at 23:42

## 2018-04-05 RX ADMIN — Medication 5 MILLIGRAM(S): at 14:10

## 2018-04-05 RX ADMIN — MODAFINIL 100 MILLIGRAM(S): 200 TABLET ORAL at 14:09

## 2018-04-05 RX ADMIN — Medication 100 MILLIGRAM(S): at 12:33

## 2018-04-05 RX ADMIN — Medication 1 TABLET(S): at 12:33

## 2018-04-05 RX ADMIN — ESCITALOPRAM OXALATE 5 MILLIGRAM(S): 10 TABLET, FILM COATED ORAL at 12:33

## 2018-04-05 RX ADMIN — INSULIN GLARGINE 5 UNIT(S): 100 INJECTION, SOLUTION SUBCUTANEOUS at 23:42

## 2018-04-05 RX ADMIN — Medication 50 MILLILITER(S): at 16:55

## 2018-04-05 RX ADMIN — Medication 1 MILLIGRAM(S): at 12:33

## 2018-04-05 RX ADMIN — Medication 650 MILLIGRAM(S): at 00:03

## 2018-04-05 RX ADMIN — CHLORHEXIDINE GLUCONATE 15 MILLILITER(S): 213 SOLUTION TOPICAL at 23:43

## 2018-04-05 RX ADMIN — MODAFINIL 100 MILLIGRAM(S): 200 TABLET ORAL at 07:26

## 2018-04-05 RX ADMIN — ERGOCALCIFEROL 6000 UNIT(S): 1.25 CAPSULE ORAL at 12:33

## 2018-04-05 RX ADMIN — CHLORHEXIDINE GLUCONATE 15 MILLILITER(S): 213 SOLUTION TOPICAL at 10:39

## 2018-04-05 RX ADMIN — PANTOPRAZOLE SODIUM 40 MILLIGRAM(S): 20 TABLET, DELAYED RELEASE ORAL at 12:33

## 2018-04-05 RX ADMIN — CHLORHEXIDINE GLUCONATE 1 APPLICATION(S): 213 SOLUTION TOPICAL at 07:26

## 2018-04-05 RX ADMIN — Medication 50 MILLILITER(S): at 15:58

## 2018-04-05 NOTE — PROGRESS NOTE ADULT - SUBJECTIVE AND OBJECTIVE BOX
OVERNIGHT EVENTS: Dosed 1/2 Lantus (6U) as patient NPO yesterday. Tolerated dialysis yesterday without Levophed, 3.6 L removed.     SUBJECTIVE / INTERVAL HPI: Patient seen and examined at bedside. Patient opening eyes when stimulated, but not following commands or responding in meaningful way    OBJECTIVE:    VITAL SIGNS:  ICU Vital Signs Last 24 Hrs  T(C): 36.7 (2018 09:34), Max: 37.6 (2018 18:00)  T(F): 98.1 (2018 09:34), Max: 99.7 (2018 18:00)  HR: 72 (2018 11:00) (70 - 100)  ABP: 88/58 (2018 11:00) (84/60 - 118/80)  ABP(mean): 68 (2018 11:00) (66 - 92)  RR: 17 (2018 11:00) (0 - 17)  SpO2: 100% (:00) (100% - 100%)    Mode: CPAP with PS, FiO2: 40, PEEP: 5, PS: 8, MAP: 8.7, PIP: 13     @ 07:01  -  04-05 @ 07:00  --------------------------------------------------------  IN: 350 mL / OUT: 3650 mL / NET: -3300 mL      CAPILLARY BLOOD GLUCOSE      POCT Blood Glucose.: 146 mg/dL (2018 11:46)      PHYSICAL EXAM:    General: Laying in bed, NAD  HEENT: NC/AT; PERRL, clear conjunctiva  Neck: supple, +trach collar in place w/ minimal oozing of blood  Respiratory: CTA b/l, no w/r/r  Cardiovascular: +S1/S2; RRR, no m/r/g  Abdomen: +PEG in place, dressing c/d/i.  Soft, NT/ND; +BS  Extremities: WWP, 1+ LE edema  Skin: + Chronic venous stasis changes of b/l LE.     MEDICATIONS:  MEDICATIONS  (STANDING):  chlorhexidine 0.12% Liquid 15 milliLiter(s) Swish and Spit two times a day  chlorhexidine 2% Cloths 1 Application(s) Topical daily  dextrose 5%. 1000 milliLiter(s) (50 mL/Hr) IV Continuous <Continuous>  dextrose 50% Injectable 12.5 Gram(s) IV Push once  dextrose 50% Injectable 25 Gram(s) IV Push once  dextrose 50% Injectable 25 Gram(s) IV Push once  dextrose 50% Injectable 25 milliLiter(s) IV Push once  ergocalciferol Drops 6000 Unit(s) Oral daily  escitalopram 5 milliGRAM(s) Oral daily  folic acid 1 milliGRAM(s) Oral daily  hydrocortisone sodium succinate Injectable 25 milliGRAM(s) IV Push every 12 hours  insulin glargine Injectable (LANTUS) 12 Unit(s) SubCutaneous at bedtime  insulin lispro (HumaLOG) corrective regimen sliding scale   SubCutaneous every 6 hours  metoclopramide Injectable 5 milliGRAM(s) IV Push every 8 hours  midodrine 12.5 milliGRAM(s) Oral every 8 hours  modafinil 100 milliGRAM(s) Oral <User Schedule>  multivitamin 1 Tablet(s) Oral daily  pantoprazole   Suspension 40 milliGRAM(s) Oral daily  sodium chloride 0.9% lock flush 20 milliLiter(s) IV Push once  thiamine 100 milliGRAM(s) Oral daily    MEDICATIONS  (PRN):  acetaminophen    Suspension. 650 milliGRAM(s) Oral every 6 hours PRN Moderate Pain (4 - 6)  dextrose Gel 1 Dose(s) Oral once PRN Blood Glucose LESS THAN 70 milliGRAM(s)/deciliter  glucagon  Injectable 1 milliGRAM(s) IntraMuscular once PRN Glucose LESS THAN 70 milligrams/deciliter  sodium chloride 0.9% lock flush 10 milliLiter(s) IV Push every 1 hour PRN After each medication administration  sodium chloride 0.9% lock flush 10 milliLiter(s) IV Push every 12 hours PRN Lumen of catheter NOT used      ALLERGIES:  Allergies    No Known Allergies    Intolerances        LABS:                        8.0    4.1   )-----------( 89       ( 2018 06:37 )             26.0     04-05    139  |  97  |  36<H>  ----------------------------<  117<H>  4.5   |  27  |  2.40<H>    Ca    8.8      2018 06:35  Phos  2.5     -  Mg     2.0     -    TPro  6.6  /  Alb  3.8  /  TBili  2.1<H>  /  DBili  x   /  AST  130<H>  /  ALT  167<H>  /  AlkPhos  317<H>  -    PT/INR - ( 2018 06:36 )   PT: 15.6 sec;   INR: 1.40          PTT - ( 2018 05:53 )  PTT:37.0 sec  Urinalysis Basic - ( 2018 18:24 )    Color: Brown / Appearance: SL Cloudy / S.020 / pH: x  Gluc: x / Ketone: NEGATIVE  / Bili: Moderate / Urobili: 0.2 E.U./dL   Blood: x / Protein: >=300 mg/dL / Nitrite: POSITIVE   Leuk Esterase: Moderate / RBC: Many /HPF / WBC Many /HPF   Sq Epi: x / Non Sq Epi: 0-5 /HPF / Bacteria: Present /HPF        RADIOLOGY & ADDITIONAL TESTS: Reviewed. OVERNIGHT EVENTS: Dosed 1/2 Lantus (6U) as patient NPO yesterday. Tolerated dialysis yesterday without Levophed, 3.6 L removed.     SUBJECTIVE / INTERVAL HPI: Patient seen and examined at bedside. Patient opening eyes when stimulated, but not following commands or responding in meaningful way. ROS not obtainable    OBJECTIVE:    VITAL SIGNS:  ICU Vital Signs Last 24 Hrs  T(C): 36.7 (2018 09:34), Max: 37.6 (2018 18:00)  T(F): 98.1 (2018 09:34), Max: 99.7 (2018 18:00)  HR: 72 (2018 11:00) (70 - 100)  ABP: 88/58 (2018 11:00) (84/60 - 118/80)  ABP(mean): 68 (:) (66 - 92)  RR: 17 (2018 11:) (0 - 17)  SpO2: 100% (:00) (100% - 100%)    Mode: CPAP with PS, FiO2: 40, PEEP: 5, PS: 8, MAP: 8.7, PIP: 13    04-04 @ 07:01  -  04-05 @ 07:00  --------------------------------------------------------  IN: 350 mL / OUT: 3650 mL / NET: -3300 mL      CAPILLARY BLOOD GLUCOSE      POCT Blood Glucose.: 146 mg/dL (2018 11:46)      PHYSICAL EXAM:    General: Laying in bed, NAD  HEENT: NC/AT; PERRL, clear conjunctiva  Neck: supple, +trach collar in place w/ minimal oozing of blood  Respiratory: CTA b/l, no w/r/r  Cardiovascular: +S1/S2; RRR, no m/r/g  Abdomen: +PEG in place, dressing c/d/i.  Soft, NT/ND; +BS  Extremities: WWP, 1+ LE edema  Vasc: 1+ DP, 2+ radial pulses  Skin: + Chronic venous stasis changes of b/l LE.   Neuro: moves extremities and tries to open eyes to command    MEDICATIONS:  MEDICATIONS  (STANDING):  chlorhexidine 0.12% Liquid 15 milliLiter(s) Swish and Spit two times a day  chlorhexidine 2% Cloths 1 Application(s) Topical daily  dextrose 5%. 1000 milliLiter(s) (50 mL/Hr) IV Continuous <Continuous>  dextrose 50% Injectable 12.5 Gram(s) IV Push once  dextrose 50% Injectable 25 Gram(s) IV Push once  dextrose 50% Injectable 25 Gram(s) IV Push once  dextrose 50% Injectable 25 milliLiter(s) IV Push once  ergocalciferol Drops 6000 Unit(s) Oral daily  escitalopram 5 milliGRAM(s) Oral daily  folic acid 1 milliGRAM(s) Oral daily  hydrocortisone sodium succinate Injectable 25 milliGRAM(s) IV Push every 12 hours  insulin glargine Injectable (LANTUS) 12 Unit(s) SubCutaneous at bedtime  insulin lispro (HumaLOG) corrective regimen sliding scale   SubCutaneous every 6 hours  metoclopramide Injectable 5 milliGRAM(s) IV Push every 8 hours  midodrine 12.5 milliGRAM(s) Oral every 8 hours  modafinil 100 milliGRAM(s) Oral <User Schedule>  multivitamin 1 Tablet(s) Oral daily  pantoprazole   Suspension 40 milliGRAM(s) Oral daily  sodium chloride 0.9% lock flush 20 milliLiter(s) IV Push once  thiamine 100 milliGRAM(s) Oral daily    MEDICATIONS  (PRN):  acetaminophen    Suspension. 650 milliGRAM(s) Oral every 6 hours PRN Moderate Pain (4 - 6)  dextrose Gel 1 Dose(s) Oral once PRN Blood Glucose LESS THAN 70 milliGRAM(s)/deciliter  glucagon  Injectable 1 milliGRAM(s) IntraMuscular once PRN Glucose LESS THAN 70 milligrams/deciliter  sodium chloride 0.9% lock flush 10 milliLiter(s) IV Push every 1 hour PRN After each medication administration  sodium chloride 0.9% lock flush 10 milliLiter(s) IV Push every 12 hours PRN Lumen of catheter NOT used      ALLERGIES:  Allergies    No Known Allergies    Intolerances        LABS:                        8.0    4.1   )-----------( 89       ( 2018 06:37 )             26.0     04-05    139  |  97  |  36<H>  ----------------------------<  117<H>  4.5   |  27  |  2.40<H>    Ca    8.8      2018 06:35  Phos  2.5       Mg     2.0         TPro  6.6  /  Alb  3.8  /  TBili  2.1<H>  /  DBili  x   /  AST  130<H>  /  ALT  167<H>  /  AlkPhos  317<H>      PT/INR - ( 2018 06:36 )   PT: 15.6 sec;   INR: 1.40          PTT - ( 2018 05:53 )  PTT:37.0 sec  Urinalysis Basic - ( 2018 18:24 )    Color: Brown / Appearance: SL Cloudy / S.020 / pH: x  Gluc: x / Ketone: NEGATIVE  / Bili: Moderate / Urobili: 0.2 E.U./dL   Blood: x / Protein: >=300 mg/dL / Nitrite: POSITIVE   Leuk Esterase: Moderate / RBC: Many /HPF / WBC Many /HPF   Sq Epi: x / Non Sq Epi: 0-5 /HPF / Bacteria: Present /HPF        RADIOLOGY & ADDITIONAL TESTS: Reviewed.

## 2018-04-05 NOTE — PROGRESS NOTE ADULT - PROBLEM SELECTOR PLAN 8
Pts HCP wants to maintain full code status, despite pts overall poor prognosis in the setting of multisystem organ failure.  Pts HCP needs more time for allow for improvement and she does not wish to limit care at this time.  We have discussed instituting a DNR order or withdrawing life sustaining therapy in the future if pt does not improve, but she will not make such a  decision now.

## 2018-04-05 NOTE — PROGRESS NOTE ADULT - ASSESSMENT
63M PMH HFrEF 10-15% (ischemic), MI, s/p AICD vs PPM, possible Afib, HTN, DM2 on insulin, possible CKD, and gout, who presented with a chief complaint of generalized weakness s/p septic shock likely 2/2 LE cellulitis. Now with AMS likely from brainstem infarct and/or toxic metabolic encephalopathy.    NEURO  #AMS  - Pt non responsive since 3/22. Vital signs normal during the event. Pt intubated for airway protection. Stroke code called. CT, CTA negative. VEEG showed moderate slowing but no seizure activity.   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 Per neuro, event could have resulted from hypoperfusion.   - Started on Modafinil.   - Unclear neuro prognosis at this point. Pt cannot protect airway.   -  TTE with Definity shows no clot at present  -S/p PEG/trach on 4/4       ID    # Bandemia (17%) and low grade fever (100.4) on 4/3-4/4  -Blood cultures drawn 4/4, NGTD  -Continue to monitor    #septic shock 2/2 Cellulitis- Resolved  - C/w midodrine to 12.5q8 given hypotension. Titrate down as tolerated  - c/w solucortef 25/20 dose. ACTH stim test was borderline normal. Will keep steroids on.  -S/p zosyn for suspected UTI(3/25-3/26)  s/p Zosyn (3/11-2/21). Was previously also on Vanco (3/11-3/17).   - RUQ doppler shows no portal vein thrombosis. given worsening transaminitis and increased residuals RUQ sono was obtained which only shows cholelithiasis.   - TTE with no vegetations. Pt not stable for RUKHSANA.   - Gallium scan showed LLE cellulitis.     - repeat LE CT does not show abscess or gas.   - R lung Effusion likely from overload. s/p thoracentesis with improvement in resp status   - HIV negative  - Vascular surgery on board. Recommending Amputation as an option if pt continues to be unstable.     #UTI- Urine cloudy appearing with increase WBC.- Resolved  - S/p zosyn 3/25-3/26    CARDIOVASCULAR   # HYPOTENSION-   -  C/w midodrine to 12.5 mg q8h to maintain SBP>65, taper as tolerated  - Will keep Solucortef on given hypotension and borderline normal ACTH stimulation test.   - Levophed titrated off  - Gallium scan with only LLE cellulitis   - Lactate downtrended to WNL   - CHF with severely reduced EF 15%, caution with fluids. Per renal recs, can give IV albumin for fluids.    # CHF exacerbation- CHF with EF 10-15% per pt 2/2 ischemic cardiomyopathy s/p AICD. Elevated BNP on admission with R sided effusion and edema  - Negative 3.6 L with dialysis yesterday (4/4)  - persistent pulmonary vascular congestion on CXR with layering pleural effusions.   - Maintain negative fluid balance with dialysis, watching BP  - Unclear medical regimen opt. Per [facility] pharmacy ( and [facility]) pt has not picked up Torsemide 20 or Metoprolol 100 since November.   - Hold ACE/Metoprolol in setting of sepsis/ hypotension      #AFIB- hx of Afib and LV thrombus on coumadin.    - Holding Metoprolol 12.5 q12h given hypotension. Will use Dig for rate control if RVR   - Holding coumadin given thrombocytopenia, will consider starting tomorrow if plts stable/ uptrending  -S/p heparin gtt, discontinued 2/2 thrombocytopenia concern for HIT.  -TTE with affinity shows no  LV thrombus currently       #CAD- Hx of MI s/p AICD  - Pt with pLAD in 7/2017, was started on Aspirin and Brelinta per records.  -Holding asp 81, Coumadin and atorvastatin (discontinued 2/2 LFT uptrend, thrombocytopenia).       # LE Edema   - LE edema with chronic venous stasis.   - Duplex does not show DVT    PULMONARY     #Acute resp failure- Resolved   - S/p tracheostomy 4/5, tolerated well.  -Tolerating CPAP trial this AM, will attempt trach collar @ 40% O2  - pt with R loculated plural effusion noted to have increased work of breathing. Likely 2/2 fluid overload, Fluid studies consistent with exudative effusion.   - s/p thoracentesis on 3/13 with R chest tube placement. Chest tube removed 3/15.   - Appreciate CHF team recs regarding fluid status. Continue dialysis for fluid removal      #RENAL   #ARF- 2/2 ischemic ATN. Unknown baseline Cr presenting with Cr 3.93 with metabolic derangements. Likely 2/2 Sepsis, low intravascular volume and CHF. Renal team on board, pt with R HD catheter placed by IR on 3/21. Now on intermittent HD.   - S/p dialysis yesterday with -3.6 L removed  -C/w intermittent HD per renal recs- CT abdomen and pelvis without acute obstruction.   - retroperitoneal ultrasound showing medical renal disease.     #Hyperkalemia- PT with elevated K to 5.7 on admission,  no EKG changes.   -S/p kayexalate and dialysis yesterday, K WNL  - Continue telemetry monitoring  - Trend K   - c/w dialysis     #Metabolic acidosis   - 2/2 Lactate, starvation ketoacidosis and uremia   - resolved       #GI   # transaminitis and elevated bili- Patient with transaminitis that has remained stable, however with new elevation in Bili to 2.1 today. Prior RUQ US with cholelithiasis and prior CT abdomen/pelvis consistent with cholelithiasis and ascites.   - Consider repeat RUQ if worsens/ persists/   - Reglan for motility.     #PEG   - s/p PEG placement 4/4, tolerated well  -Will start feeds via PEG today    #HEME  #Thrombocytopenia- ELVIRA negative but PF4 was positive. Unlikely HIT. Most likely 2/2 zosyn or sepsis   -S/p 1 U plts 4/4 prior to PEG, plts downtrending again today suggesting consumption  - Continue to hold coumadin and Aspirin  -. Monitor for signs of bleeding        # elevated INR. Unclear etiology.   - Holding coumadin given thrombocytopenia  - Given Vit K and FFP3/12. FFP 3/13, FFP 4/4  - Monitor coags    #Anemia- Likely 2/2 phlebotomy and CKD, no signs of bleeding. Prior studies showed Anemia of chronic disease.  -Continue to monitor  -s/p 1pRBC on 3/12, 3/16 and 4/4    #ENDOCRINE   - S/p insulin drip  - c/w Lantus 12U  - Will adjust as we taper down steroids.   -  Monitor blood glucose and ISS coverage. pt with episodes of hypoglycemia.   - A1C 8.6%      F: No IVF   E: Replete K<4 Mg<2, caution in setting of acute renal failure  N: PEG Placed 4/4, 1.5 Glucerna      Lines:  ETT (3/22), OG tube (3/21)R HD catheter (3/21)., L Midline 3/20,  Drips: Levophed     Full code  Dispo: MICU  Ppx: Coumadin (Held), PPI

## 2018-04-05 NOTE — PROGRESS NOTE ADULT - SUBJECTIVE AND OBJECTIVE BOX
Patient was seen and evaluated on dialysis.   Patient is tolerating the procedure well.   HR: 72 (04-05-18 @ 12:00)  BP: 90/56  Continue dialysis:   Dialyzer: revaclear 300 QB: 300 QD: 500 3K bath    Goal UF 3kg over 3 Hours

## 2018-04-05 NOTE — CHART NOTE - NSCHARTNOTEFT_GEN_A_CORE
Upon Nutritional Assessment by the Registered Dietitian your patient was determined to meet criteria / has evidence of the following diagnosis/diagnoses:          [ ]  Mild Protein Calorie Malnutrition        [ ]  Moderate Protein Calorie Malnutrition        [X ] Severe Protein Calorie Malnutrition        [ ] Unspecified Protein Calorie Malnutrition        [ ] Underweight / BMI <19        [ ] Morbid Obesity / BMI > 40      Findings as based on:  •  Comprehensive nutrition assessment and consultation      Weight loss of >5% for 1 month, muscle loss in temporal, clavicular, and acromion process regions      Treatment:    The following diet has been recommended:  2 Terrell HN @ 50mL/hr x 24hrs via PEG plus 2 Pkt ProStat (200 kcal, 30g protein) to provide in total: 1200 mL TV, 2600 kcal, 130g protein, 840mL free H2O, 127% RDI, 1.46g/kg IBW protein. Start feeds at 30mL/hr and advance by 10mL Q4hrs to goal rate of 50mL. Monitor for s/s intolerance. Maintain aspiration precautions. Provide extra free H2O per MD discretion.       PROVIDER Section:     By signing this assessment you are acknowledging and agree with the diagnosis/diagnoses assigned by the Registered Dietitian    Comments:

## 2018-04-05 NOTE — PROGRESS NOTE ADULT - ASSESSMENT
63yr old M with PMhx significant for HFrEF 10-15% 2/2 ischemic cardiomyopathy, sp AICD, CAD sp MI, Afib, HTN, DMII on insulin, CKD, gout, who was admitted for management of septic shock, likely 2/2 cellulitis of LE. Patients hospital course has been complicated by respiratory failure requiring intubation, and inability to extubate, AMS thought to be 2/2 CVA vs seizure, ARF 2/2 ischemic ATN on HD, A fib with RVR, Acute on chronic CHF exacerbation, transaminitis, thrombocytopenia.  GI consulted for dysphagia.    Dysphagia -   - patients mental status not improving off sedation  - sp PEG placement 4/4/18 - showed gastritis in stomach  - PEG in place with clean site  - can start using PEG for feeds today      Discussed with attending Dr Paredes  GI will sign off for now, please reconsult us as needed

## 2018-04-05 NOTE — PROGRESS NOTE ADULT - PROBLEM SELECTOR PLAN 1
Patient is a 63  year old male with acute on chronic renal failure from ischemic ATN. Patient is currently requiring hemodialysis. Patient was last dialyzed 4/4 with UF of 3.6L. Patient is net negative 3.3L     P - No dialysis today as electrolytes are acceptable and volume status is acceptable   Will anticipate next dialysis tomorrow   Now off levophed

## 2018-04-05 NOTE — PROGRESS NOTE ADULT - PROBLEM SELECTOR PLAN 4
mental status remains overall poor.  Pt sleeps most of the day, intermittently follows some simple commands.  remains dependent for all care.

## 2018-04-05 NOTE — PROGRESS NOTE ADULT - SUBJECTIVE AND OBJECTIVE BOX
HUNTER BRIZUELA   MRN-[id number]     (1955):     HPI:  64 yo M history of HFrEF 10-15% 2/2 ischemic cardiomyopathy, MI , s/p AICD vs PPM, ?Afib, Hypertension, Diabetes Mellitus Type 2 on insulin, CKD?and gout, who presents with a chief complaint of generalized weakness. Pt wad admitted to [facility] 2 months ago for gout and was sent to rehab. He was discharged home mid Feb with VNS. In the past 2 weeks patient has noted increased leg pain and weakness bilaterally. In the last few days, he has also experienced generalized weakness prompting him to come to ER.   In ER, noted to have t max of 102, SBP 70s, leukocytosis to 32.8, Lactate of 6.6, Acute renal failure with severe metabolic derangements. Given 3.5L fluids and Vanc/Zosyn. MICU consulted. (10 Mar 2018 18:51)    Pt remains intubated  He is off sedation and has a poor mental status, al be it slightly improved.    Pt is receiving and is more engaging, al be it briefly. Pt will follow some simple commands.  Pt now on intermittent HD, back on levophed, intubated x 11 days.     Pt medicine will sign off as the goals have been clarified at this point.  Please reconsult if needed.    Interval History:  Pt is s/p trach and PEG.    He is actually breathing on TC today-- off the ventilator.  Pt asleep in bed.  Shook head "yes" to one question.  Not waking up for me. Not following commands.     ROS:   nonverbal   Unable to attain ROS due to:  poor mental status                    Dyspnea (Heriberto 0-10):                       N/V (Y/N):                            Secretions (Y/N):              Agitation(Y/N):  Pain (Y/N):       -Provocation/Palliation:  -Quality/Quantity:  -Radiating:  -Severity:  -Timing/Frequency:  -Impact on ADLs:    Allergies:  No Known Allergies    Intolerances    Opiate Naive (Y/N):   yes  -iStop reviewed (Y/N):  yes ref # [id number]    MEDICATIONS  (STANDING):  chlorhexidine 0.12% Liquid 15 milliLiter(s) Swish and Spit two times a day  chlorhexidine 2% Cloths 1 Application(s) Topical daily  dextrose 5%. 1000 milliLiter(s) (50 mL/Hr) IV Continuous <Continuous>  dextrose 50% Injectable 12.5 Gram(s) IV Push once  dextrose 50% Injectable 25 Gram(s) IV Push once  dextrose 50% Injectable 25 Gram(s) IV Push once  dextrose 50% Injectable 25 milliLiter(s) IV Push once  ergocalciferol Drops 6000 Unit(s) Oral daily  escitalopram 5 milliGRAM(s) Oral daily    MEDICATIONS  (PRN):  acetaminophen    Suspension. 650 milliGRAM(s) Oral every 6 hours PRN Moderate Pain (4 - 6)  dextrose Gel 1 Dose(s) Oral once PRN Blood Glucose LESS THAN 70 milliGRAM(s)/deciliter  glucagon  Injectable 1 milliGRAM(s) IntraMuscular once PRN Glucose LESS THAN 70 milligrams/deciliter  sodium chloride 0.9% lock flush 10 milliLiter(s) IV Push every 1 hour PRN After each medication administration  sodium chloride 0.9% lock flush 10 milliLiter(s) IV Push every 12 hours PRN Lumen of catheter NOT used  folic acid 1 milliGRAM(s) Oral daily  hydrocortisone sodium succinate Injectable 25 milliGRAM(s) IV Push every 12 hours  insulin glargine Injectable (LANTUS) 12 Unit(s) SubCutaneous at bedtime  insulin lispro (HumaLOG) corrective regimen sliding scale   SubCutaneous every 6 hours  metoclopramide Injectable 5 milliGRAM(s) IV Push every 8 hours  midodrine 12.5 milliGRAM(s) Oral every 8 hours  modafinil 100 milliGRAM(s) Oral <User Schedule>  multivitamin 1 Tablet(s) Oral daily  pantoprazole   Suspension 40 milliGRAM(s) Oral daily  sodium chloride 0.9% lock flush 20 milliLiter(s) IV Push once  thiamine 100 milliGRAM(s) Oral daily      Labs:                                      8.0    4.1   )-----------( 89       ( 2018 06:37 )             26.0     04-05    139  |  97  |  36<H>  ----------------------------<  117<H>  4.5   |  27  |  2.40<H>    Ca    8.8      2018 06:35  Phos  2.5     04-05  Mg     2.0     04-05    TPro  6.6  /  Alb  3.8  /  TBili  2.1<H>  /  DBili  x   /  AST  130<H>  /  ALT  167<H>  /  AlkPhos  317<H>        IMAGING:   < from: Xray Chest 1 View- PORTABLE-Routine (18 @ 06:17) >  EXAM:  XR CHEST PORTABLE ROUTINE 1V                          PROCEDURE DATE:  2018                     INTERPRETATION:  Clinical History: Intubation    Portable examination the chest demonstrates endotracheal tube tip   overlying thoracic inlet. No interval change position remaining support   devices in comparison to prior examination of the chest 3/31/2018.   Congestion and/or infiltrates. Bilateral effusions.    Impression: Mild congestion and/or infiltrates. Bilateral effusions. Tip   of endotracheal tube overlying thoracic inlet    < end of copied text >      PEx:  ICU Vital Signs Last 24 Hrs  T(C): 36.7 (2018 09:34), Max: 37.6 (2018 18:00)  T(F): 98.1 (2018 09:34), Max: 99.7 (2018 18:00)  HR: 72 (2018 11:00) (70 - 100)  BP: --  BP(mean): --  ABP: 88/58 (2018 11:00) (84/60 - 118/80)  ABP(mean): 68 (2018 11:00) (66 - 92)  RR: 17 (2018 11:00) (0 - 17)  SpO2: 100% (2018 11:00) (100% - 100%)      General:  ill appearing, thin, not distressed   HEENT:  nc/at, thin,  temporal wasting, moist oral cavity,  no eye contact  Neck:   supple, neg lymphadenopathy,  L IJ TLC, trach in place-- on TC  CVS:  SR,  rate controlled, pacer noted to  L chest wall, + R HD catheter, distant heart tones  Resp:  non-labored, no rhonchi  GI:   nondistended, soft,  +PEG tube in place  :   no barnes present, + scrotal edema, anuric  Musc:   weak, thin, no spontaneous movement,  r hand with mitten restraint  Ext: crusty, dressed lower L extremity, L arm PICC, R hand restraint, less edema  Neuro: remains delayed + waking to voice- flutters eyes only  Psych: maritza  Skin:  thin, dry, cool, + generalized edema although improving, pitting edema to the hips bilaterally  Lymph:  neg  Preadmit Karnofsky:   50 %           Current Karnofsky:   30    %  Cachexia (Y/N):   yes  BMI:  22    Advanced Directives:     Full Code     HCP noted on chart     DPOA  (for finances)       Decision maker:   pt was deemed not to have decision making capacity on  by psychiatry  Legal surrogate:  pts dgt Cristal Castro [phone] is pts HCP    Social History:   single, lives by self, pt has a HHA 4hrs/day, 3 days per week.  Pt has 2 adult children (Cristal aged 37) and pts son, is 24 and lives in [facility]. 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    He is retired but reports he is not on disability.   Pt is "spiritual" and practices a "Cheondoism" raquel background   Per notes, pt has not been compliant with his medication (not picked up prescription from his outpt pharmacy) since 2017.    GOALS OF CARE DISCUSSION:       Palliative care info/counseling provided-- following	           Family meeting-  today, see below for details       Advanced Directives addressed please see Advance Care Planning Note-- 	           Documentation of GOC:  to allow more time for a neurologic recovery, HCP authorized trach and PEG to support pt          REFERRALS:	        Palliative Med        Unit SW/Case Mgmt       - referred       Massage Therapy-- referred       Music Therapy-- referred       Nutrition-- following

## 2018-04-05 NOTE — PROGRESS NOTE ADULT - PROBLEM SELECTOR PLAN 2
Now on intermittent HD  Pt was able to have significant  fluid removal during HS and not require vasopressor support.

## 2018-04-05 NOTE — PROGRESS NOTE ADULT - SUBJECTIVE AND OBJECTIVE BOX
ENT Progress Note    HPI: 63M s/p tracheotomy with Renato flap with placement of 6.0 cuffed Shiley trach 4/4.    Patient received 1U pRBC, 1U platelets, and 1U FFP pre-op. Patient received an additional 1U platelets intra-op.    Interval: Stable in MICU. Stable on vent. Suction with moderate thick secretions.    PE:  NAD  [id number]  6.0 cuffed Shiley trach, cuff up, sutured in place, secured with soft neck tie  Duoderm under trach flanges  Trach site dry, no bleeding  Neck flat  Suction with moderate thick secretions, no blood    A/P: 63M s/p tracheotomy with Renato flap with placement of 6.0 cuffed Shiley trach 4/4.  - Suction trach with red rubber catheter prn  - Replace duoderm under trach flanges as needed to prevent pressure ulcers  - ENT will plan to remove trach flange sutures POD5  - Care per MICU  - Contact ENT with any questions or concerns    Discussed with attending

## 2018-04-05 NOTE — PROGRESS NOTE ADULT - SUBJECTIVE AND OBJECTIVE BOX
Pt seen and examined at bedside  sp trach and PEG yesterday  No new c/o per primary team  No emesis, bleeding      MEDICATIONS:  MEDICATIONS  (STANDING):  chlorhexidine 0.12% Liquid 15 milliLiter(s) Swish and Spit two times a day  chlorhexidine 2% Cloths 1 Application(s) Topical daily  dextrose 5%. 1000 milliLiter(s) (50 mL/Hr) IV Continuous <Continuous>  dextrose 50% Injectable 12.5 Gram(s) IV Push once  dextrose 50% Injectable 25 Gram(s) IV Push once  dextrose 50% Injectable 25 Gram(s) IV Push once  dextrose 50% Injectable 25 milliLiter(s) IV Push once  ergocalciferol Drops 6000 Unit(s) Oral daily  escitalopram 5 milliGRAM(s) Oral daily  folic acid 1 milliGRAM(s) Oral daily  hydrocortisone sodium succinate Injectable 25 milliGRAM(s) IV Push every 12 hours  insulin glargine Injectable (LANTUS) 12 Unit(s) SubCutaneous at bedtime  insulin lispro (HumaLOG) corrective regimen sliding scale   SubCutaneous every 6 hours  metoclopramide Injectable 5 milliGRAM(s) IV Push every 8 hours  midodrine 12.5 milliGRAM(s) Oral every 8 hours  modafinil 100 milliGRAM(s) Oral <User Schedule>  multivitamin 1 Tablet(s) Oral daily  pantoprazole   Suspension 40 milliGRAM(s) Oral daily  sodium chloride 0.9% lock flush 20 milliLiter(s) IV Push once  thiamine 100 milliGRAM(s) Oral daily    MEDICATIONS  (PRN):  acetaminophen    Suspension. 650 milliGRAM(s) Oral every 6 hours PRN Moderate Pain (4 - 6)  dextrose Gel 1 Dose(s) Oral once PRN Blood Glucose LESS THAN 70 milliGRAM(s)/deciliter  glucagon  Injectable 1 milliGRAM(s) IntraMuscular once PRN Glucose LESS THAN 70 milligrams/deciliter  sodium chloride 0.9% lock flush 10 milliLiter(s) IV Push every 1 hour PRN After each medication administration  sodium chloride 0.9% lock flush 10 milliLiter(s) IV Push every 12 hours PRN Lumen of catheter NOT used      Allergies  No Known Allergies    Intolerances      Vital Signs Last 24 Hrs  T(C): 36.7 (2018 09:34), Max: 37.7 (2018 12:25)  T(F): 98.1 (2018 09:34), Max: 99.8 (2018 12:25)  HR: 74 (2018 08:41) (70 - 100)  BP: 101/71 (2018 12:25) (101/71 - 101/71)  BP(mean): --  RR: 12 (2018 08:41) (0 - 16)  SpO2: 100% (2018 08:41) (98% - 100%)     @ 07:01  -   @ 07:00  --------------------------------------------------------  IN: 350 mL / OUT: 3650 mL / NET: -3300 mL      PHYSICAL EXAM:  GEN: middle aged M lying in bed in no distress, tracheostomy, but no purposeful movements  Gastrointestinal: full, soft, BS+, +PEG in place, NT, NR, NG, no masses or organs palpated  Extremities: no edema  Neurological: sedated  Skin: intact    LABS:  CBC Full  -  ( 2018 06:37 )  WBC Count : 4.1 K/uL  Hemoglobin : 8.0 g/dL  Hematocrit : 26.0 %  Platelet Count - Automated : 89 K/uL  Mean Cell Volume : 101.2 fL  Mean Cell Hemoglobin : 31.1 pg  Mean Cell Hemoglobin Concentration : 30.8 g/dL    139  |  97  |  36<H>  ----------------------------<  117<H>  4.5   |  27  |  2.40<H>    Ca    8.8      2018 06:35  Phos  2.5     -  Mg     2.0     -    TPro  6.6  /  Alb  3.8  /  TBili  2.1<H>  /  DBili  x   /  AST  130<H>  /  ALT  167<H>  /  AlkPhos  317<H>  04-    PT/INR - ( 2018 06:36 )   PT: 15.6 sec;   INR: 1.40       PTT - ( 2018 05:53 )  PTT:37.0 sec    Urinalysis Basic - ( 2018 18:24 )  Color: Brown / Appearance: SL Cloudy / S.020 / pH: x  Gluc: x / Ketone: NEGATIVE  / Bili: Moderate / Urobili: 0.2 E.U./dL   Blood: x / Protein: >=300 mg/dL / Nitrite: POSITIVE   Leuk Esterase: Moderate / RBC: Many /HPF / WBC Many /HPF   Sq Epi: x / Non Sq Epi: 0-5 /HPF / Bacteria: Present /HPF          RADIOLOGY & ADDITIONAL STUDIES (The following images were personally reviewed):

## 2018-04-05 NOTE — PROGRESS NOTE ADULT - SUBJECTIVE AND OBJECTIVE BOX
Patient seen and examined at bedside. Patient is a 63 year old male with a history of HFrEF 10-15% due to ischemic cardiomyopathy, s/p AICD, ?atrial fibrillation, hypertension, diabetes mellitus type 2, gout, and CKD for whom nephrology was called for GILMER from ATN requiring hemodialysis. Patient was last dialyzed  with UF of 3.6L     acetaminophen    Suspension. 650 milliGRAM(s) every 6 hours PRN  chlorhexidine 0.12% Liquid 15 milliLiter(s) two times a day  chlorhexidine 2% Cloths 1 Application(s) daily  dextrose 5%. 1000 milliLiter(s) <Continuous>  dextrose 50% Injectable 12.5 Gram(s) once  dextrose 50% Injectable 25 Gram(s) once  dextrose 50% Injectable 25 Gram(s) once  dextrose 50% Injectable 25 milliLiter(s) once  dextrose Gel 1 Dose(s) once PRN  ergocalciferol Drops 6000 Unit(s) daily  escitalopram 5 milliGRAM(s) daily  folic acid 1 milliGRAM(s) daily  glucagon  Injectable 1 milliGRAM(s) once PRN  hydrocortisone sodium succinate Injectable 25 milliGRAM(s) every 12 hours  insulin glargine Injectable (LANTUS) 12 Unit(s) at bedtime  insulin lispro (HumaLOG) corrective regimen sliding scale   every 6 hours  metoclopramide Injectable 5 milliGRAM(s) every 8 hours  midodrine 12.5 milliGRAM(s) every 8 hours  modafinil 100 milliGRAM(s) <User Schedule>  multivitamin 1 Tablet(s) daily  pantoprazole   Suspension 40 milliGRAM(s) daily  sodium chloride 0.9% lock flush 10 milliLiter(s) every 1 hour PRN  sodium chloride 0.9% lock flush 10 milliLiter(s) every 12 hours PRN  sodium chloride 0.9% lock flush 20 milliLiter(s) once  thiamine 100 milliGRAM(s) daily    Allergies    No Known Allergies    Intolerances    T(C): , Max: 37.7 (18 @ 12:25)  T(F): , Max: 99.8 (18 @ 12:25)  HR: 72 (18 @ 08:00)  BP: 101/71 (18 @ 12:25)  RR: 10 (04-05-18 @ 08:00)  SpO2: 100% (18 @ 08:00)     @ 07:01  -   @ 07:00  --------------------------------------------------------  IN:    Albumin 25%: 150 mL    Enteral Tube Flush: 200 mL  Total IN: 350 mL    OUT:    Other: 3650 mL  Total OUT: 3650 mL    Total NET: -3300 mL    Height (cm): 190.5 ( @ 12:25)  Weight (kg): 80 ( 12:25)  BMI (kg/m2): 22 (:25)  BSA (m2): 2.08 ( 12:25)    LABS:                        8.0    4.1   )-----------( 89       ( 2018 06:37 )             26.0         139  |  97  |  36<H>  ----------------------------<  117<H>  4.5   |  27  |  2.40<H>    Ca    8.8      2018 06:35  Phos  2.5     -  Mg     2.0         TPro  6.6  /  Alb  3.8  /  TBili  2.1<H>  /  DBili  x   /  AST  130<H>  /  ALT  167<H>  /  AlkPhos  317<H>      PT/INR - ( 2018 06:36 )   PT: 15.6 sec;   INR: 1.40        PTT - ( 2018 05:53 )  PTT:37.0 sec  Urinalysis Basic - ( 2018 18:24 )    Color: Brown / Appearance: SL Cloudy / S.020 / pH: x  Gluc: x / Ketone: NEGATIVE  / Bili: Moderate / Urobili: 0.2 E.U./dL   Blood: x / Protein: >=300 mg/dL / Nitrite: POSITIVE   Leuk Esterase: Moderate / RBC: Many /HPF / WBC Many /HPF   Sq Epi: x / Non Sq Epi: 0-5 /HPF / Bacteria: Present /HPF

## 2018-04-06 PROBLEM — Z00.00 ENCOUNTER FOR PREVENTIVE HEALTH EXAMINATION: Status: ACTIVE | Noted: 2018-04-06

## 2018-04-06 LAB
ALBUMIN SERPL ELPH-MCNC: 3.9 G/DL — SIGNIFICANT CHANGE UP (ref 3.3–5)
ALP SERPL-CCNC: 290 U/L — HIGH (ref 40–120)
ALT FLD-CCNC: 109 U/L — HIGH (ref 10–45)
ANION GAP SERPL CALC-SCNC: 11 MMOL/L — SIGNIFICANT CHANGE UP (ref 5–17)
AST SERPL-CCNC: 50 U/L — HIGH (ref 10–40)
BILIRUB SERPL-MCNC: 1.3 MG/DL — HIGH (ref 0.2–1.2)
BUN SERPL-MCNC: 38 MG/DL — HIGH (ref 7–23)
CALCIUM SERPL-MCNC: 9 MG/DL — SIGNIFICANT CHANGE UP (ref 8.4–10.5)
CHLORIDE SERPL-SCNC: 96 MMOL/L — SIGNIFICANT CHANGE UP (ref 96–108)
CO2 SERPL-SCNC: 28 MMOL/L — SIGNIFICANT CHANGE UP (ref 22–31)
CORTICOSTEROID BINDING GLOBULIN RESULT: 1.4 MG/DL — LOW
CORTIS F/TOTAL MFR SERPL: 23 % — SIGNIFICANT CHANGE UP
CORTIS SERPL-MCNC: 11 UG/DL — SIGNIFICANT CHANGE UP
CORTISOL, FREE RESULT: 2.6 UG/DL — SIGNIFICANT CHANGE UP
CREAT SERPL-MCNC: 2.4 MG/DL — HIGH (ref 0.5–1.3)
GLUCOSE BLDC GLUCOMTR-MCNC: 170 MG/DL — HIGH (ref 70–99)
GLUCOSE BLDC GLUCOMTR-MCNC: 248 MG/DL — HIGH (ref 70–99)
GLUCOSE BLDC GLUCOMTR-MCNC: 270 MG/DL — HIGH (ref 70–99)
GLUCOSE BLDC GLUCOMTR-MCNC: 307 MG/DL — HIGH (ref 70–99)
GLUCOSE SERPL-MCNC: 290 MG/DL — HIGH (ref 70–99)
HCT VFR BLD CALC: 27.9 % — LOW (ref 39–50)
HGB BLD-MCNC: 8.5 G/DL — LOW (ref 13–17)
LYMPHOCYTES # BLD AUTO: 5 % — LOW (ref 13–44)
MAGNESIUM SERPL-MCNC: 2 MG/DL — SIGNIFICANT CHANGE UP (ref 1.6–2.6)
MCHC RBC-ENTMCNC: 30.5 G/DL — LOW (ref 32–36)
MCHC RBC-ENTMCNC: 31.5 PG — SIGNIFICANT CHANGE UP (ref 27–34)
MCV RBC AUTO: 103.3 FL — HIGH (ref 80–100)
MONOCYTES NFR BLD AUTO: 5 % — SIGNIFICANT CHANGE UP (ref 2–14)
NEUTROPHILS NFR BLD AUTO: 53 % — SIGNIFICANT CHANGE UP (ref 43–77)
PHOSPHATE SERPL-MCNC: 2.2 MG/DL — LOW (ref 2.5–4.5)
PLATELET # BLD AUTO: 76 K/UL — LOW (ref 150–400)
POTASSIUM SERPL-MCNC: 4.6 MMOL/L — SIGNIFICANT CHANGE UP (ref 3.5–5.3)
POTASSIUM SERPL-SCNC: 4.6 MMOL/L — SIGNIFICANT CHANGE UP (ref 3.5–5.3)
PROT SERPL-MCNC: 6.5 G/DL — SIGNIFICANT CHANGE UP (ref 6–8.3)
RBC # BLD: 2.7 M/UL — LOW (ref 4.2–5.8)
RBC # FLD: 19.5 % — HIGH (ref 10.3–16.9)
SODIUM SERPL-SCNC: 135 MMOL/L — SIGNIFICANT CHANGE UP (ref 135–145)
WBC # BLD: 6 K/UL — SIGNIFICANT CHANGE UP (ref 3.8–10.5)
WBC # FLD AUTO: 6 K/UL — SIGNIFICANT CHANGE UP (ref 3.8–10.5)

## 2018-04-06 PROCEDURE — 71045 X-RAY EXAM CHEST 1 VIEW: CPT | Mod: 26

## 2018-04-06 PROCEDURE — 99233 SBSQ HOSP IP/OBS HIGH 50: CPT | Mod: GC

## 2018-04-06 PROCEDURE — 76705 ECHO EXAM OF ABDOMEN: CPT | Mod: 26

## 2018-04-06 RX ORDER — HUMAN INSULIN 100 [IU]/ML
6 INJECTION, SUSPENSION SUBCUTANEOUS ONCE
Qty: 0 | Refills: 0 | Status: COMPLETED | OUTPATIENT
Start: 2018-04-06 | End: 2018-04-06

## 2018-04-06 RX ORDER — ALBUMIN HUMAN 25 %
50 VIAL (ML) INTRAVENOUS
Qty: 0 | Refills: 0 | Status: COMPLETED | OUTPATIENT
Start: 2018-04-06 | End: 2018-04-06

## 2018-04-06 RX ORDER — INSULIN GLARGINE 100 [IU]/ML
12 INJECTION, SOLUTION SUBCUTANEOUS AT BEDTIME
Qty: 0 | Refills: 0 | Status: DISCONTINUED | OUTPATIENT
Start: 2018-04-06 | End: 2018-04-08

## 2018-04-06 RX ORDER — MIDODRINE HYDROCHLORIDE 2.5 MG/1
15 TABLET ORAL EVERY 8 HOURS
Qty: 0 | Refills: 0 | Status: DISCONTINUED | OUTPATIENT
Start: 2018-04-06 | End: 2018-04-18

## 2018-04-06 RX ADMIN — Medication 6: at 07:15

## 2018-04-06 RX ADMIN — HUMAN INSULIN 6 UNIT(S): 100 INJECTION, SUSPENSION SUBCUTANEOUS at 10:10

## 2018-04-06 RX ADMIN — ESCITALOPRAM OXALATE 5 MILLIGRAM(S): 10 TABLET, FILM COATED ORAL at 11:54

## 2018-04-06 RX ADMIN — CHLORHEXIDINE GLUCONATE 15 MILLILITER(S): 213 SOLUTION TOPICAL at 23:24

## 2018-04-06 RX ADMIN — Medication 4: at 23:24

## 2018-04-06 RX ADMIN — Medication 50 MILLILITER(S): at 15:40

## 2018-04-06 RX ADMIN — MODAFINIL 100 MILLIGRAM(S): 200 TABLET ORAL at 07:10

## 2018-04-06 RX ADMIN — MIDODRINE HYDROCHLORIDE 12.5 MILLIGRAM(S): 2.5 TABLET ORAL at 03:12

## 2018-04-06 RX ADMIN — Medication 50 MILLILITER(S): at 16:40

## 2018-04-06 RX ADMIN — PANTOPRAZOLE SODIUM 40 MILLIGRAM(S): 20 TABLET, DELAYED RELEASE ORAL at 11:54

## 2018-04-06 RX ADMIN — CHLORHEXIDINE GLUCONATE 15 MILLILITER(S): 213 SOLUTION TOPICAL at 10:10

## 2018-04-06 RX ADMIN — Medication 50 MILLILITER(S): at 17:40

## 2018-04-06 RX ADMIN — Medication 1 TABLET(S): at 11:53

## 2018-04-06 RX ADMIN — ERGOCALCIFEROL 6000 UNIT(S): 1.25 CAPSULE ORAL at 11:54

## 2018-04-06 RX ADMIN — Medication 100 MILLIGRAM(S): at 11:54

## 2018-04-06 RX ADMIN — MIDODRINE HYDROCHLORIDE 15 MILLIGRAM(S): 2.5 TABLET ORAL at 11:53

## 2018-04-06 RX ADMIN — Medication 25 MILLIGRAM(S): at 07:10

## 2018-04-06 RX ADMIN — Medication 2: at 18:00

## 2018-04-06 RX ADMIN — Medication 1 MILLIGRAM(S): at 11:53

## 2018-04-06 RX ADMIN — Medication 8: at 11:54

## 2018-04-06 RX ADMIN — Medication 25 MILLIGRAM(S): at 18:52

## 2018-04-06 RX ADMIN — MODAFINIL 100 MILLIGRAM(S): 200 TABLET ORAL at 11:54

## 2018-04-06 RX ADMIN — INSULIN GLARGINE 12 UNIT(S): 100 INJECTION, SOLUTION SUBCUTANEOUS at 23:25

## 2018-04-06 RX ADMIN — MIDODRINE HYDROCHLORIDE 15 MILLIGRAM(S): 2.5 TABLET ORAL at 18:53

## 2018-04-06 NOTE — PROGRESS NOTE ADULT - SUBJECTIVE AND OBJECTIVE BOX
INTERVAL HPI/OVERNIGHT EVENTS: Required levophed during dialysis, -3L removed. Dosed Lantus 5U.     SUBJECTIVE: Patient seen and examined at bedside. Patient opens eyes, tracking people around room. Nods yes/no to questions.     OBJECTIVE:    VITAL SIGNS:  ICU Vital Signs Last 24 Hrs  T(C): 37.1 (06 Apr 2018 19:00), Max: 38.2 (06 Apr 2018 07:00)  T(F): 98.7 (06 Apr 2018 19:00), Max: 100.7 (06 Apr 2018 07:00)  HR: 90 (06 Apr 2018 19:00) (84 - 112)  BP: 82/64 (06 Apr 2018 19:00) (79/62 - 100/75)  BP(mean): 71 (06 Apr 2018 19:00) (68 - 93)  ABP: 82/52 (06 Apr 2018 07:44) (82/52 - 118/76)  ABP(mean): 64 (06 Apr 2018 07:44) (60 - 90)  RR: 23 (06 Apr 2018 19:00) (13 - 29)  SpO2: 100% (06 Apr 2018 19:00) (93% - 100%)    Mode: standby    04-05 @ 07:01  -  04-06 @ 07:00  --------------------------------------------------------  IN: 945 mL / OUT: 3000 mL / NET: -2055 mL    04-06 @ 07:01 - 04-06 @ 20:10  --------------------------------------------------------  IN: 875 mL / OUT: 2000 mL / NET: -1125 mL      CAPILLARY BLOOD GLUCOSE      POCT Blood Glucose.: 170 mg/dL (06 Apr 2018 17:25)      PHYSICAL EXAM:    General: Awake, intermittently following commands.   HEENT: NC/AT; PERRL, clear conjunctiva  Neck: supple, + trach in place +moderate oozing of blood  Respiratory: CTA b/l, no w/r/r  Cardiovascular: +S1/S2; RRR, no m/r/g  Abdomen: soft, NT/ND; +PEG in place, dressing c/d/i  Extremities: WWP, 2+ LE to thigh  Skin: +Chronic venous stasis changes of LE      MEDICATIONS:  MEDICATIONS  (STANDING):  chlorhexidine 0.12% Liquid 15 milliLiter(s) Swish and Spit two times a day  chlorhexidine 2% Cloths 1 Application(s) Topical daily  dextrose 5%. 1000 milliLiter(s) (50 mL/Hr) IV Continuous <Continuous>  dextrose 50% Injectable 12.5 Gram(s) IV Push once  dextrose 50% Injectable 25 Gram(s) IV Push once  dextrose 50% Injectable 25 Gram(s) IV Push once  ergocalciferol Drops 6000 Unit(s) Oral daily  escitalopram 5 milliGRAM(s) Oral daily  folic acid 1 milliGRAM(s) Oral daily  hydrocortisone sodium succinate Injectable 25 milliGRAM(s) IV Push every 12 hours  insulin glargine Injectable (LANTUS) 12 Unit(s) SubCutaneous at bedtime  insulin lispro (HumaLOG) corrective regimen sliding scale   SubCutaneous every 6 hours  midodrine 15 milliGRAM(s) Oral every 8 hours  modafinil 100 milliGRAM(s) Oral <User Schedule>  multivitamin 1 Tablet(s) Oral daily  pantoprazole   Suspension 40 milliGRAM(s) Oral daily  sodium chloride 0.9% lock flush 20 milliLiter(s) IV Push once  thiamine 100 milliGRAM(s) Oral daily    MEDICATIONS  (PRN):  acetaminophen    Suspension. 650 milliGRAM(s) Oral every 6 hours PRN Moderate Pain (4 - 6)  dextrose Gel 1 Dose(s) Oral once PRN Blood Glucose LESS THAN 70 milliGRAM(s)/deciliter  glucagon  Injectable 1 milliGRAM(s) IntraMuscular once PRN Glucose LESS THAN 70 milligrams/deciliter  sodium chloride 0.9% lock flush 10 milliLiter(s) IV Push every 1 hour PRN After each medication administration  sodium chloride 0.9% lock flush 10 milliLiter(s) IV Push every 12 hours PRN Lumen of catheter NOT used      ALLERGIES:  Allergies    No Known Allergies    Intolerances        LABS:                        8.5    6.0   )-----------( 76       ( 06 Apr 2018 06:09 )             27.9     04-06    135  |  96  |  38<H>  ----------------------------<  290<H>  4.6   |  28  |  2.40<H>    Ca    9.0      06 Apr 2018 06:09  Phos  2.2     04-06  Mg     2.0     04-06    TPro  6.5  /  Alb  3.9  /  TBili  1.3<H>  /  DBili  x   /  AST  50<H>  /  ALT  109<H>  /  AlkPhos  290<H>  04-06    PT/INR - ( 05 Apr 2018 06:36 )   PT: 15.6 sec;   INR: 1.40         RADIOLOGY & ADDITIONAL TESTS: Reviewed.

## 2018-04-06 NOTE — PROGRESS NOTE ADULT - PROBLEM SELECTOR PLAN 1
Patient is a 63  year old male with acute on chronic renal failure from ischemic ATN. Patient is currently requiring hemodialysis. Patient was last dialyzed 4/5 with UF of 3L. Patient is net negative 2L.     P - Discussed with ICU team. Will dialyze patient today to get patient net negative   Revaclear 300, ,  3K bath   Goal UF 3kg over 3 hours

## 2018-04-06 NOTE — PROGRESS NOTE ADULT - SUBJECTIVE AND OBJECTIVE BOX
Patient seen and examined at bedside. Patient is a 63 year old male with a history of HFrEF 10-15% due to ischemic cardiomyopathy, s/p AICD, ?atrial fibrillation, hypertension, diabetes mellitus type 2, gout, and CKD for whom nephrology was called for GILMER from ATN requiring hemodialysis. Patient was last dialyzed  with UF of 3L. Patient is net negative 2L. Patient is s/p tracheostomy and PEG.     acetaminophen    Suspension. 650 milliGRAM(s) every 6 hours PRN  chlorhexidine 0.12% Liquid 15 milliLiter(s) two times a day  chlorhexidine 2% Cloths 1 Application(s) daily  dextrose 5%. 1000 milliLiter(s) <Continuous>  dextrose 50% Injectable 12.5 Gram(s) once  dextrose 50% Injectable 25 Gram(s) once  dextrose 50% Injectable 25 Gram(s) once  dextrose Gel 1 Dose(s) once PRN  ergocalciferol Drops 6000 Unit(s) daily  escitalopram 5 milliGRAM(s) daily  folic acid 1 milliGRAM(s) daily  glucagon  Injectable 1 milliGRAM(s) once PRN  hydrocortisone sodium succinate Injectable 25 milliGRAM(s) every 12 hours  insulin glargine Injectable (LANTUS) 12 Unit(s) at bedtime  insulin lispro (HumaLOG) corrective regimen sliding scale   every 6 hours  midodrine 15 milliGRAM(s) every 8 hours  modafinil 100 milliGRAM(s) <User Schedule>  multivitamin 1 Tablet(s) daily  pantoprazole   Suspension 40 milliGRAM(s) daily  sodium chloride 0.9% lock flush 10 milliLiter(s) every 1 hour PRN  sodium chloride 0.9% lock flush 10 milliLiter(s) every 12 hours PRN  sodium chloride 0.9% lock flush 20 milliLiter(s) once  thiamine 100 milliGRAM(s) daily    Allergies    No Known Allergies    Intolerances    T(C): , Max: 38.2 (18 @ 07:00)  T(F): , Max: 100.7 (18 @ 07:00)  HR: 94 (18 @ 10:00)  BP: 83/72 (18 @ 10:00)  BP(mean): 78 (18 @ 10:00)  RR: 22 (18 @ 10:00)  SpO2: 100% (18 @ 10:00)     @ 07:01  -   @ 07:00  --------------------------------------------------------  IN:    Enteral Tube Flush: 275 mL    TwoCal HN: 670 mL  Total IN: 945 mL    OUT:    Other: 3000 mL  Total OUT: 3000 mL    Total NET: -2055 mL       @ 07:  -   @ 10:38  --------------------------------------------------------  IN:    TwoCal HN: 100 mL  Total IN: 100 mL    OUT:  Total OUT: 0 mL    Total NET: 100 mL    LABS:                        8.5    6.0   )-----------( 76       ( 2018 06:09 )             27.9     04-06    135  |  96  |  38<H>  ----------------------------<  290<H>  4.6   |  28  |  2.40<H>    Ca    9.0      2018 06:09  Phos  2.2     -  Mg     2.0     -    TPro  6.5  /  Alb  3.9  /  TBili  1.3<H>  /  DBili  x   /  AST  50<H>  /  ALT  109<H>  /  AlkPhos  290<H>      PT/INR - ( 2018 06:36 )   PT: 15.6 sec;   INR: 1.40       Urinalysis Basic - ( 2018 18:24 )    Color: Brown / Appearance: SL Cloudy / S.020 / pH: x  Gluc: x / Ketone: NEGATIVE  / Bili: Moderate / Urobili: 0.2 E.U./dL   Blood: x / Protein: >=300 mg/dL / Nitrite: POSITIVE   Leuk Esterase: Moderate / RBC: Many /HPF / WBC Many /HPF   Sq Epi: x / Non Sq Epi: 0-5 /HPF / Bacteria: Present /HPF

## 2018-04-06 NOTE — PROGRESS NOTE ADULT - SUBJECTIVE AND OBJECTIVE BOX
Patient was seen and evaluated on dialysis.   Patient is tolerating the procedure well.   HR: 94 (04-06-18 @ 15:30)  BP: 100/75 (04-06-18 @ 15:30)  Continue dialysis:   Revaclear 300, ,  3K bath   Goal UF decreased to 2kg over 3 hours.

## 2018-04-06 NOTE — PROGRESS NOTE ADULT - ASSESSMENT
63M PMH HFrEF 10-15% (ischemic), MI, s/p AICD vs PPM, possible Afib, HTN, DM2 on insulin, possible CKD, and gout, who presented with a chief complaint of generalized weakness s/p septic shock likely 2/2 LE cellulitis. Now with AMS likely from brainstem infarct and/or toxic metabolic encephalopathy and + SIRS but no infection source.     NEURO  #AMS  - Pt non responsive since 3/22. Vital signs normal during the event. Pt intubated for airway protection. Stroke code called. CT, CTA negative. VEEG showed moderate slowing but no seizure activity.   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 Per neuro, event could have resulted from hypoperfusion.   - C/w Modafinil.   - Unclear neuro prognosis at this point. Pt cannot protect airway.   -  TTE with Definity shows no clot at present  - S/p PEG/trach on 4/4       ID    #+SIRS- Bandemia (37%) and low grade fever (100.7) again today  -Repeat blood cultures  -RUQ US to r/o cholecystitis given elevated bili yesterday  -Blood cultures drawn 4/4, NGTD  -Continue to monitor    #septic shock 2/2 Cellulitis- Resolved  - Increase midodrine to 15 q8 given still requiring levophed during dialysis.   - c/w solucortef 25mg q 12hrs ACTH stim test was borderline normal. Will keep steroids on.  -S/p zosyn for suspected UTI(3/25-3/26)  s/p Zosyn (3/11-2/21). Was previously also on Vanco (3/11-3/17).   - RUQ doppler shows no portal vein thrombosis. given worsening transaminitis and increased residuals RUQ sono was obtained which only shows cholelithiasis.   - TTE with no vegetations. Pt not stable for RUKHSANA.   - Gallium scan showed LLE cellulitis.     - repeat LE CT does not show abscess or gas.   - R lung Effusion likely from overload. s/p thoracentesis with improvement in resp status   - HIV negative  - Vascular surgery on board. Recommending Amputation as an option if pt continues to be unstable.     #UTI- Urine cloudy appearing with increase WBC.- Resolved  - S/p zosyn 3/25-3/26    CARDIOVASCULAR   # HYPOTENSION-   -  Midodrine increased to 15 mg q8h to maintain SBP>65  - Will keep Solucortef on given hypotension and borderline normal ACTH stimulation test.   - Levophed titrated off  - Gallium scan with only LLE cellulitis   - Lactate downtrended to WNL   - CHF with severely reduced EF 15%, caution with fluids. Per renal recs, can give IV albumin for fluids.    # CHF exacerbation- CHF with EF 10-15% per pt 2/2 ischemic cardiomyopathy s/p AICD. Elevated BNP on admission with R sided effusion and edema  - Negative 3 L with dialysis yesterday (4/5), planned for dialysis again today  - persistent pulmonary vascular congestion on CXR with layering pleural effusions.   - Maintain negative fluid balance with dialysis, watching BP  - Unclear medical regimen opt. Per [facility] pharmacy ( and [place]) pt has not picked up Torsemide 20 or Metoprolol 100 since November.   - Hold ACE/Metoprolol in setting of sepsis/ hypotension      #AFIB- hx of Afib and LV thrombus on coumadin.    - Holding Metoprolol 12.5 q12h given hypotension. Will use Dig for rate control if RVR   - Holding coumadin given thrombocytopenia, will consider starting when plts stable/ uptrending  -S/p heparin gtt, discontinued 2/2 thrombocytopenia concern for HIT.  -TTE with affinity shows no  LV thrombus currently       #CAD- Hx of MI s/p AICD  - Pt with pLAD in 7/2017, was started on Aspirin and Brelinta per records.  -Holding asp 81, Coumadin and atorvastatin (discontinued 2/2 LFT uptrend, thrombocytopenia).       # LE Edema   - LE edema with chronic venous stasis.   - Duplex does not show DVT    PULMONARY     #Acute resp failure- Resolved   - S/p tracheostomy 4/5, tolerated well. W/ oozing around trach likely 2/2 thrombocytopenia, evaluated by ENT  -Tolerating trach collar @ 40% O2  - pt with R loculated plural effusion noted to have increased work of breathing. Likely 2/2 fluid overload, Fluid studies consistent with exudative effusion.   - s/p thoracentesis on 3/13 with R chest tube placement. Chest tube removed 3/15.   - Appreciate CHF team recs regarding fluid status. Continue dialysis for fluid removal      #RENAL   #ARF- 2/2 ischemic ATN. Unknown baseline Cr presenting with Cr 3.93 with metabolic derangements. Likely 2/2 Sepsis, low intravascular volume and CHF. Renal team on board, pt with R HD catheter placed by IR on 3/21. Now on intermittent HD.   - S/p dialysis yesterday with -3 L removed  -C/w intermittent HD per renal recs- CT abdomen and pelvis without acute obstruction.   - retroperitoneal ultrasound showing medical renal disease.     #Hyperkalemia- PT with elevated K to 5.7 on admission,  no EKG changes. Currently resolved  - Continue telemetry monitoring  - Trend K   - c/w dialysis     #Metabolic acidosis   - 2/2 Lactate, starvation ketoacidosis and uremia   - resolved       #GI   # transaminitis and elevated bili- Patient with transaminitis that has remained stable, however with new elevation in Bili to 2.1 today. Prior RUQ US with cholelithiasis and prior CT abdomen/pelvis consistent with cholelithiasis and ascites.   - Repeat RUQ US    #PEG   - s/p PEG placement 4/4, tolerated well    #HEME  #Thrombocytopenia- ELVIRA negative but PF4 was positive. Unlikely HIT. Most likely 2/2 zosyn or sepsis   -S/p 1 U plts 4/4 prior to PEG, plts downtrending again today   - Continue to hold coumadin and Aspirin  -. Monitor for signs of bleeding        # elevated INR. Unclear etiology.   - Holding coumadin given thrombocytopenia  - Given Vit K and FFP3/12. FFP 3/13, FFP 4/4  - Monitor coags    #Anemia- Likely 2/2 phlebotomy and CKD, no signs of bleeding. Prior studies showed Anemia of chronic disease.  -Continue to monitor  -s/p 1pRBC on 3/12, 3/16 and 4/4    #ENDOCRINE   - S/p insulin drip  - c/w Lantus 12U  - Will adjust as we taper down steroids.   -  Monitor blood glucose and ISS coverage. pt with episodes of hypoglycemia.   - A1C 8.6%      F: No IVF   E: Replete K<4 Mg<2, caution in setting of acute renal failure  N: PEG Placed 4/4, 1.5 Glucerna      Lines:  ETT (3/22), OG tube (3/21)R HD catheter (3/21).,     Drips: Levophed     Full code  Dispo: MICU  Ppx: PPI, no heparin/ coumadin given thrombocytopenia

## 2018-04-07 LAB
-  CANDIDA TROPICALIS: SIGNIFICANT CHANGE UP
ALBUMIN SERPL ELPH-MCNC: 3.6 G/DL — SIGNIFICANT CHANGE UP (ref 3.3–5)
ALP SERPL-CCNC: 246 U/L — HIGH (ref 40–120)
ALT FLD-CCNC: 73 U/L — HIGH (ref 10–45)
ANION GAP SERPL CALC-SCNC: 13 MMOL/L — SIGNIFICANT CHANGE UP (ref 5–17)
AST SERPL-CCNC: 29 U/L — SIGNIFICANT CHANGE UP (ref 10–40)
BASOPHILS NFR BLD AUTO: 0.2 % — SIGNIFICANT CHANGE UP (ref 0–2)
BILIRUB SERPL-MCNC: 1.1 MG/DL — SIGNIFICANT CHANGE UP (ref 0.2–1.2)
BUN SERPL-MCNC: 41 MG/DL — HIGH (ref 7–23)
CALCIUM SERPL-MCNC: 8.9 MG/DL — SIGNIFICANT CHANGE UP (ref 8.4–10.5)
CHLORIDE SERPL-SCNC: 97 MMOL/L — SIGNIFICANT CHANGE UP (ref 96–108)
CO2 SERPL-SCNC: 28 MMOL/L — SIGNIFICANT CHANGE UP (ref 22–31)
CREAT SERPL-MCNC: 2.32 MG/DL — HIGH (ref 0.5–1.3)
EOSINOPHIL NFR BLD AUTO: 0 % — SIGNIFICANT CHANGE UP (ref 0–6)
GLUCOSE BLDC GLUCOMTR-MCNC: 233 MG/DL — HIGH (ref 70–99)
GLUCOSE BLDC GLUCOMTR-MCNC: 247 MG/DL — HIGH (ref 70–99)
GLUCOSE BLDC GLUCOMTR-MCNC: 254 MG/DL — HIGH (ref 70–99)
GLUCOSE BLDC GLUCOMTR-MCNC: 281 MG/DL — HIGH (ref 70–99)
GLUCOSE SERPL-MCNC: 299 MG/DL — HIGH (ref 70–99)
GRAM STN FLD: SIGNIFICANT CHANGE UP
HCT VFR BLD CALC: 27.4 % — LOW (ref 39–50)
HGB BLD-MCNC: 8.4 G/DL — LOW (ref 13–17)
LYMPHOCYTES # BLD AUTO: 7.7 % — LOW (ref 13–44)
MAGNESIUM SERPL-MCNC: 2 MG/DL — SIGNIFICANT CHANGE UP (ref 1.6–2.6)
MCHC RBC-ENTMCNC: 30.7 G/DL — LOW (ref 32–36)
MCHC RBC-ENTMCNC: 31.6 PG — SIGNIFICANT CHANGE UP (ref 27–34)
MCV RBC AUTO: 103 FL — HIGH (ref 80–100)
METHOD TYPE: SIGNIFICANT CHANGE UP
MONOCYTES NFR BLD AUTO: 4.8 % — SIGNIFICANT CHANGE UP (ref 2–14)
NEUTROPHILS NFR BLD AUTO: 87.3 % — HIGH (ref 43–77)
PHOSPHATE SERPL-MCNC: 1.2 MG/DL — LOW (ref 2.5–4.5)
PLATELET # BLD AUTO: 62 K/UL — LOW (ref 150–400)
POTASSIUM SERPL-MCNC: 4.6 MMOL/L — SIGNIFICANT CHANGE UP (ref 3.5–5.3)
POTASSIUM SERPL-SCNC: 4.6 MMOL/L — SIGNIFICANT CHANGE UP (ref 3.5–5.3)
PROT SERPL-MCNC: 6.5 G/DL — SIGNIFICANT CHANGE UP (ref 6–8.3)
RBC # BLD: 2.66 M/UL — LOW (ref 4.2–5.8)
RBC # FLD: 18.9 % — HIGH (ref 10.3–16.9)
SODIUM SERPL-SCNC: 138 MMOL/L — SIGNIFICANT CHANGE UP (ref 135–145)
WBC # BLD: 4.8 K/UL — SIGNIFICANT CHANGE UP (ref 3.8–10.5)
WBC # FLD AUTO: 4.8 K/UL — SIGNIFICANT CHANGE UP (ref 3.8–10.5)

## 2018-04-07 PROCEDURE — 71045 X-RAY EXAM CHEST 1 VIEW: CPT | Mod: 26

## 2018-04-07 PROCEDURE — 90935 HEMODIALYSIS ONE EVALUATION: CPT

## 2018-04-07 PROCEDURE — 99233 SBSQ HOSP IP/OBS HIGH 50: CPT | Mod: GC

## 2018-04-07 RX ORDER — INSULIN LISPRO 100/ML
4 VIAL (ML) SUBCUTANEOUS
Qty: 0 | Refills: 0 | Status: DISCONTINUED | OUTPATIENT
Start: 2018-04-07 | End: 2018-04-08

## 2018-04-07 RX ORDER — MICAFUNGIN SODIUM 100 MG/1
INJECTION, POWDER, LYOPHILIZED, FOR SOLUTION INTRAVENOUS
Qty: 0 | Refills: 0 | Status: DISCONTINUED | OUTPATIENT
Start: 2018-04-07 | End: 2018-04-08

## 2018-04-07 RX ORDER — HUMAN INSULIN 100 [IU]/ML
10 INJECTION, SUSPENSION SUBCUTANEOUS ONCE
Qty: 0 | Refills: 0 | Status: COMPLETED | OUTPATIENT
Start: 2018-04-07 | End: 2018-04-07

## 2018-04-07 RX ORDER — ALBUMIN HUMAN 25 %
50 VIAL (ML) INTRAVENOUS
Qty: 0 | Refills: 0 | Status: COMPLETED | OUTPATIENT
Start: 2018-04-07 | End: 2018-04-07

## 2018-04-07 RX ORDER — NOREPINEPHRINE BITARTRATE/D5W 8 MG/250ML
0.01 PLASTIC BAG, INJECTION (ML) INTRAVENOUS
Qty: 8 | Refills: 0 | Status: DISCONTINUED | OUTPATIENT
Start: 2018-04-07 | End: 2018-04-08

## 2018-04-07 RX ADMIN — Medication 50 MILLILITER(S): at 16:14

## 2018-04-07 RX ADMIN — PANTOPRAZOLE SODIUM 40 MILLIGRAM(S): 20 TABLET, DELAYED RELEASE ORAL at 12:23

## 2018-04-07 RX ADMIN — Medication 100 MILLIGRAM(S): at 12:21

## 2018-04-07 RX ADMIN — MODAFINIL 100 MILLIGRAM(S): 200 TABLET ORAL at 12:22

## 2018-04-07 RX ADMIN — MODAFINIL 100 MILLIGRAM(S): 200 TABLET ORAL at 07:34

## 2018-04-07 RX ADMIN — MIDODRINE HYDROCHLORIDE 15 MILLIGRAM(S): 2.5 TABLET ORAL at 10:26

## 2018-04-07 RX ADMIN — ERGOCALCIFEROL 6000 UNIT(S): 1.25 CAPSULE ORAL at 12:23

## 2018-04-07 RX ADMIN — ESCITALOPRAM OXALATE 5 MILLIGRAM(S): 10 TABLET, FILM COATED ORAL at 12:21

## 2018-04-07 RX ADMIN — CHLORHEXIDINE GLUCONATE 15 MILLILITER(S): 213 SOLUTION TOPICAL at 22:22

## 2018-04-07 RX ADMIN — INSULIN GLARGINE 12 UNIT(S): 100 INJECTION, SOLUTION SUBCUTANEOUS at 22:32

## 2018-04-07 RX ADMIN — Medication: at 22:37

## 2018-04-07 RX ADMIN — Medication 4: at 17:39

## 2018-04-07 RX ADMIN — HUMAN INSULIN 10 UNIT(S): 100 INJECTION, SUSPENSION SUBCUTANEOUS at 07:35

## 2018-04-07 RX ADMIN — MIDODRINE HYDROCHLORIDE 15 MILLIGRAM(S): 2.5 TABLET ORAL at 01:20

## 2018-04-07 RX ADMIN — Medication 6: at 07:37

## 2018-04-07 RX ADMIN — CHLORHEXIDINE GLUCONATE 15 MILLILITER(S): 213 SOLUTION TOPICAL at 10:26

## 2018-04-07 RX ADMIN — Medication 1 MILLIGRAM(S): at 12:22

## 2018-04-07 RX ADMIN — Medication 25 MILLIGRAM(S): at 07:34

## 2018-04-07 RX ADMIN — Medication 4 UNIT(S): at 18:32

## 2018-04-07 RX ADMIN — Medication 25 MILLIGRAM(S): at 17:39

## 2018-04-07 RX ADMIN — Medication 50 MILLILITER(S): at 15:10

## 2018-04-07 RX ADMIN — Medication 1 TABLET(S): at 12:22

## 2018-04-07 RX ADMIN — Medication 6: at 12:35

## 2018-04-07 RX ADMIN — Medication 50 MILLILITER(S): at 17:10

## 2018-04-07 RX ADMIN — MIDODRINE HYDROCHLORIDE 15 MILLIGRAM(S): 2.5 TABLET ORAL at 17:39

## 2018-04-07 NOTE — PROGRESS NOTE ADULT - ATTENDING COMMENTS
ATN on CKD 2/2 septic and cardiogenic shock with persistent CRS EF 10% req. almost daily dialysis for adequate volume removal considering low blood pressures. Attempting 2-3kg UF today if MAP remains >65. Hypophosphatemic so no clearance, please replete.

## 2018-04-07 NOTE — PROGRESS NOTE ADULT - ATTENDING COMMENTS
Patient seen and examined with house-staff during bedside rounds.  Resident note read, including vitals, physical findings, laboratory data, and radiological reports.   Revisions included below.  Direct personal management at bed side and extensive interpretation of the data.  Plan was outlined and discussed in details with the housestaff.  Decision making of high complexity  Action taken for acute disease activity to reflect the level of care provided:  - medication reconciliation  - review laboratory data  - US of the chest revealed bilateral pleural effusions and ascites with LL atelectasis  - HD with fluid removal  - Evaluated the anemia and thrombocytopenia  - Evaluated the patient for weaning trial and he did well on PSV and fro a trial of T collar  - Transaminaemia improved  - Continue tube feed tolerated well  - Hyperglycemia and adjusted insulin  - Hold on thoracentesis  - Reviewed blood cultures

## 2018-04-07 NOTE — PROGRESS NOTE ADULT - SUBJECTIVE AND OBJECTIVE BOX
. PGY1 PROGRESS NOTE:    OVERNIGHT EVENTS: Placed on CPAP this morning with pressure support. FS still elevated in 240-280 range.     SUBJECTIVE / INTERVAL HPI: Patient seen and examined at bedside. Patient on tach and vent. Appears comfortable. Responding by nodding head to questions. Eyes tracking. Unable to assess ROS.    VITAL SIGNS:  Vital Signs Last 24 Hrs  T(C): 36.9 (07 Apr 2018 15:10), Max: 37.9 (07 Apr 2018 01:46)  T(F): 98.5 (07 Apr 2018 15:10), Max: 100.3 (07 Apr 2018 01:46)  HR: 89 (07 Apr 2018 17:15) (84 - 106)  BP: 92/69 (07 Apr 2018 17:00) (82/64 - 105/83)  BP(mean): 75 (07 Apr 2018 17:00) (70 - 89)  RR: 23 (07 Apr 2018 17:15) (11 - 28)  SpO2: 100% (07 Apr 2018 17:15) (92% - 100%)    PHYSICAL EXAM:  General: Awake, intermittently following commands.   HEENT: NC/AT; PERRL, clear conjunctiva  Neck: supple, + trach in place +moderate oozing of blood  Respiratory: CTA b/l, no w/r/r  Cardiovascular: +S1/S2; RRR, no m/r/g  Abdomen: soft, NT/ND; +PEG in place, dressing c/d/i  Extremities: WWP, 2+ LE to thigh  Skin: +Chronic venous stasis changes of LE    MEDICATIONS:  MEDICATIONS  (STANDING):  albumin human 25% IVPB 50 milliLiter(s) IV Intermittent every 1 hour  chlorhexidine 0.12% Liquid 15 milliLiter(s) Swish and Spit two times a day  chlorhexidine 2% Cloths 1 Application(s) Topical daily  dextrose 5%. 1000 milliLiter(s) (50 mL/Hr) IV Continuous <Continuous>  dextrose 50% Injectable 12.5 Gram(s) IV Push once  dextrose 50% Injectable 25 Gram(s) IV Push once  dextrose 50% Injectable 25 Gram(s) IV Push once  ergocalciferol Drops 6000 Unit(s) Oral daily  escitalopram 5 milliGRAM(s) Oral daily  folic acid 1 milliGRAM(s) Oral daily  hydrocortisone sodium succinate Injectable 25 milliGRAM(s) IV Push every 12 hours  insulin glargine Injectable (LANTUS) 12 Unit(s) SubCutaneous at bedtime  insulin lispro (HumaLOG) corrective regimen sliding scale   SubCutaneous every 6 hours  midodrine 15 milliGRAM(s) Oral every 8 hours  modafinil 100 milliGRAM(s) Oral <User Schedule>  multivitamin 1 Tablet(s) Oral daily  norepinephrine Infusion 0.01 MICROgram(s)/kG/Min (1.5 mL/Hr) IV Continuous <Continuous>  pantoprazole   Suspension 40 milliGRAM(s) Oral daily  sodium chloride 0.9% lock flush 20 milliLiter(s) IV Push once  thiamine 100 milliGRAM(s) Oral daily    MEDICATIONS  (PRN):  acetaminophen    Suspension. 650 milliGRAM(s) Oral every 6 hours PRN Moderate Pain (4 - 6)  dextrose Gel 1 Dose(s) Oral once PRN Blood Glucose LESS THAN 70 milliGRAM(s)/deciliter  glucagon  Injectable 1 milliGRAM(s) IntraMuscular once PRN Glucose LESS THAN 70 milligrams/deciliter  sodium chloride 0.9% lock flush 10 milliLiter(s) IV Push every 1 hour PRN After each medication administration  sodium chloride 0.9% lock flush 10 milliLiter(s) IV Push every 12 hours PRN Lumen of catheter NOT used      ALLERGIES:  No Known Allergies      LABS:                        8.4    4.8   )-----------( 62       ( 07 Apr 2018 06:47 )             27.4     04-07    138  |  97  |  41<H>  ----------------------------<  299<H>  4.6   |  28  |  2.32<H>    Ca    8.9      07 Apr 2018 06:47  Phos  1.2     04-07  Mg     2.0     04-07    TPro  6.5  /  Alb  3.6  /  TBili  1.1  /  DBili  x   /  AST  29  /  ALT  73<H>  /  AlkPhos  246<H>  04-07 04-06-18 @ 07:01  -  04-07-18 @ 07:00  --------------------------------------------------------  IN: 1725 mL / OUT: 2000 mL / NET: -275 mL    04-07-18 @ 07:01  -  04-07-18 @ 17:45  --------------------------------------------------------  IN: 630 mL / OUT: 0 mL / NET: 630 mL          RADIOLOGY & ADDITIONAL TESTS: Reviewed.    63M PMH HFrEF 10-15% (ischemic), MI, s/p AICD vs PPM, possible Afib, HTN, DM2 on insulin, possible CKD, and gout, who presented with a chief complaint of generalized weakness s/p septic shock likely 2/2 LE cellulitis. Now with AMS likely from brainstem infarct and/or toxic metabolic encephalopathy and + SIRS but no infection source.     NEURO  #AMS  - Pt non responsive since 3/22. Vital signs normal during the event. Pt intubated for airway protection. Stroke code called. CT, CTA negative. VEEG showed moderate slowing but no seizure activity.   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 Per neuro, event could have resulted from hypoperfusion.   - C/w Modafinil.   - Unclear neuro prognosis at this point. Pt cannot protect airway.   - TTE with Definity shows no clot at present  - S/p PEG/trach on 4/4       ID  #+SIRS- Bandemia (37%) and low grade fever. CXR with worsened b/l opacifications. Bedside US with worsened simple b/l pleural effusions and atelectic lung. Less concern for infectious source given b/l effusions with no septations/loculations. Likely related to fluid overload. If clinically worsening, can consider thoracentesis for fluid studies and cultures.   - Repeat blood cultures NGTD  - RUQ US to r/o cholecystitis given elevated bili yesterday; awaiting read  - Continue to monitor; will hold on antibiotics for now given unclear source of infection    #Septic shock 2/2 Cellulitis- Resolved  - Increase midodrine to 15 q8 given still requiring levophed during dialysis.   - c/w solucortef 25mg q 12hrs ACTH stim test was borderline normal. Will keep steroids on.  - S/p zosyn for suspected UTI(3/25-3/26)  s/p Zosyn (3/11-2/21). Was previously also on Vanco (3/11-3/17).   - RUQ doppler shows no portal vein thrombosis. given worsening transaminitis and increased residuals RUQ sono was obtained which only shows cholelithiasis.   - TTE with no vegetations. Pt not stable for RUKHSANA.   - Gallium scan showed LLE cellulitis.     - repeat LE CT does not show abscess or gas  - HIV negative  - Vascular surgery on board. Recommending Amputation as an option if pt continues to be unstable.     #UTI- Urine cloudy appearing with increase WBC.- Resolved  - S/p zosyn 3/25-3/26    CARDIOVASCULAR   # HYPOTENSION-   - c/w Midodrine 15 mg q8h to maintain SBP>65  - Will keep Solucortef on given hypotension and borderline normal ACTH stimulation test.   - Levophed titrated off, will restart if needed during HD to maintain MAP >65  - CHF with severely reduced EF 15%, caution with fluids. Per renal recs, can give IV albumin for fluids.    # CHF exacerbation- CHF with EF 10-15% per pt 2/2 ischemic cardiomyopathy s/p AICD. Elevated BNP on admission with R sided effusion and edema  - plan for HD again today  - persistent pulmonary vascular congestion on CXR with layering pleural effusions.   - Maintain negative fluid balance with dialysis, watching BP  - Unclear medical regimen opt. Per [facility] pharmacy ( and [facility]) pt has not picked up Torsemide 20 or Metoprolol 100 since November.   - Hold ACE/Metoprolol in setting of sepsis/ hypotension    #AFIB- hx of Afib and LV thrombus on coumadin.    - Holding Metoprolol 12.5 q12h given hypotension. Will use Dig for rate control if RVR   - Holding coumadin given thrombocytopenia, will consider starting when plts stable/ uptrending  - S/p heparin gtt, discontinued 2/2 thrombocytopenia concern for HIT.  - TTE with affinity shows no  LV thrombus currently    #CAD- Hx of MI s/p AICD  - Pt with pLAD in 7/2017, was started on Aspirin and Brilinta per records.  - Holding asp 81, coumadin and atorvastatin (discontinued 2/2 LFT uptrend, thrombocytopenia).     #LE Edema   - LE edema with chronic venous stasis.   - Duplex does not show DVT    PULMONARY   #Acute Resp failure- S/p tracheostomy 4/5. Back on vent overnight with CPAP trial this morning. CXR today with worsened opacities on L and R. Bedside US with worsened simple b/l pleural effusions and atelectic lung. Less concern for infectious source given b/l effusions with no septations/loculations. Likely related to fluid overload. If clinically worsening, can consider thoracentesis for fluid studies and cultures.  - c/w vent weaned to trach collar @ 40% O2    - continue dialysis for fluid removal      RENAL   #ARF- likely ischemic ATN in setting of sepsis with low intravascular volume. Unknown baseline Cr presenting with Cr 3.93 with metabolic derangements. Renal team on board, pt with R HD catheter placed by IR on 3/21. Now on intermittent HD.   - c/w intermittent HD per renal recs  - CT abdomen and pelvis without acute obstruction.   - retroperitoneal ultrasound showing medical renal disease.     #Hyperkalemia- PT with elevated K to 5.7 on admission,  no EKG changes. Currently resolved  - Continue telemetry monitoring  - Trend K   - c/w dialysis     #Metabolic acidosis   - 2/2 Lactate, starvation ketoacidosis and uremia   - resolved       GI   # transaminitis and elevated bili- Patient with transaminitis that has remained stable, however with new elevation in Bili to 2.1 today. Prior RUQ US with cholelithiasis and prior CT abdomen/pelvis consistent with cholelithiasis and ascites.   - Repeat RUQ US    HEME  #Thrombocytopenia- ELVIRA negative but PF4 was positive. Unlikely HIT. Most likely 2/2 zosyn or sepsis   - Continue to hold coumadin and Aspirin  - Monitor for signs of bleeding   - transfuse if <10 or <50 bleeding    #Elevated INR. Unclear etiology.   - Holding coumadin given thrombocytopenia  - Given Vit K and FFP3/12. FFP 3/13, FFP 4/4  - Monitor coags    #Anemia- Likely 2/2 phlebotomy and CKD, no signs of bleeding. Prior studies showed Anemia of chronic disease.  -Continue to monitor  -s/p 1pRBC on 3/12, 3/16 and 4/4    ENDOCRINE   - c/w Lantus 12U and start humalog 4units TID with meals  - MISS  - monitor FSG and adjust as needed   - A1C 8.6%      F: No IVF   E: Replete K<4 Mg<2, caution in setting of acute renal failure  N: PEG Placed 4/4, 1.5 Glucerna      Lines:  ETT (3/22), OG tube (3/21)R HD catheter (3/21).,   Drips: Levophed   Full code  Dispo: MICU  Ppx: PPI, no heparin/ coumadin given thrombocytopenia

## 2018-04-07 NOTE — PROGRESS NOTE ADULT - SUBJECTIVE AND OBJECTIVE BOX
Patient seen and examined at bedside. Patient is a 63 year old male with a history of HFrEF 10-15% due to ischemic cardiomyopathy, s/p AICD, ?atrial fibrillation, hypertension, diabetes mellitus type 2, gout, and CKD for whom nephrology was called for GILMER from ATN requiring hemodialysis.  Patient was last dialyzed 4/6 with UF of 2L and patient is net negative 275 cc.     acetaminophen    Suspension. 650 milliGRAM(s) every 6 hours PRN  albumin human 25% IVPB 50 milliLiter(s) every 1 hour  chlorhexidine 0.12% Liquid 15 milliLiter(s) two times a day  chlorhexidine 2% Cloths 1 Application(s) daily  dextrose 5%. 1000 milliLiter(s) <Continuous>  dextrose 50% Injectable 12.5 Gram(s) once  dextrose 50% Injectable 25 Gram(s) once  dextrose 50% Injectable 25 Gram(s) once  dextrose Gel 1 Dose(s) once PRN  ergocalciferol Drops 6000 Unit(s) daily  escitalopram 5 milliGRAM(s) daily  folic acid 1 milliGRAM(s) daily  glucagon  Injectable 1 milliGRAM(s) once PRN  hydrocortisone sodium succinate Injectable 25 milliGRAM(s) every 12 hours  insulin glargine Injectable (LANTUS) 12 Unit(s) at bedtime  insulin lispro (HumaLOG) corrective regimen sliding scale   every 6 hours  midodrine 15 milliGRAM(s) every 8 hours  modafinil 100 milliGRAM(s) <User Schedule>  multivitamin 1 Tablet(s) daily  pantoprazole   Suspension 40 milliGRAM(s) daily  sodium chloride 0.9% lock flush 10 milliLiter(s) every 1 hour PRN  sodium chloride 0.9% lock flush 10 milliLiter(s) every 12 hours PRN  sodium chloride 0.9% lock flush 20 milliLiter(s) once  thiamine 100 milliGRAM(s) daily    Allergies    No Known Allergies    Intolerances    T(C): , Max: 37.9 (04-07-18 @ 01:46)  T(F): , Max: 100.3 (04-07-18 @ 01:46)  HR: 92 (04-07-18 @ 13:01)  BP: 90/73 (04-07-18 @ 13:01)  BP(mean): 80 (04-07-18 @ 13:01)  RR: 21 (04-07-18 @ 13:01)  SpO2: 100% (04-07-18 @ 13:01)    04-06 @ 07:01  -  04-07 @ 07:00  --------------------------------------------------------  IN:    Enteral Tube Flush: 515 mL    Solution: 10 mL    TwoCal HN: 1200 mL  Total IN: 1725 mL    OUT:    Other: 2000 mL  Total OUT: 2000 mL    Total NET: -275 mL    04-07 @ 07:01  -  04-07 @ 13:43  --------------------------------------------------------  IN:    Enteral Tube Flush: 60 mL    TwoCal HN: 200 mL  Total IN: 260 mL    OUT:  Total OUT: 0 mL    Total NET: 260 mL    LABS:                        8.4    4.8   )-----------( 62       ( 07 Apr 2018 06:47 )             27.4     04-07    138  |  97  |  41<H>  ----------------------------<  299<H>  4.6   |  28  |  2.32<H>    Ca    8.9      07 Apr 2018 06:47  Phos  1.2     04-07  Mg     2.0     04-07    TPro  6.5  /  Alb  3.6  /  TBili  1.1  /  DBili  x   /  AST  29  /  ALT  73<H>  /  AlkPhos  246<H>  04-07

## 2018-04-07 NOTE — PROGRESS NOTE ADULT - SUBJECTIVE AND OBJECTIVE BOX
Patient was seen and evaluated on dialysis.   Patient is tolerating the procedure well.   HR: 94 (04-07-18 @ 15:10)  BP: 97/74 (04-07-18 @ 15:10)  Continue dialysis:   UF only for 3kg over 3 hours

## 2018-04-07 NOTE — PROGRESS NOTE ADULT - PROBLEM SELECTOR PLAN 1
Patient is a 63  year old male with acute on chronic renal failure from ischemic ATN. Patient is currently requiring hemodialysis. Patient was last dialyzed 4/6 with UF of 2L. Patient is net negative 275 cc.     P - Discussed with ICU team. Will dialyze patient today to get patient net negative   Revaclear 300, ,  3K bath   Goal UF 3kg over 3 hours Patient is a 63  year old male with acute on chronic renal failure from ischemic ATN. Patient is currently requiring hemodialysis. Patient was last dialyzed 4/6 with UF of 2L. Patient is net negative 275 cc.     P - Discussed with ICU team. Will dialyze UF only  today to get patient net negative   Goal UF only for 3kg over 3 hours

## 2018-04-08 LAB
ANION GAP SERPL CALC-SCNC: 13 MMOL/L — SIGNIFICANT CHANGE UP (ref 5–17)
BASOPHILS NFR BLD AUTO: 0.2 % — SIGNIFICANT CHANGE UP (ref 0–2)
BUN SERPL-MCNC: 54 MG/DL — HIGH (ref 7–23)
CALCIUM SERPL-MCNC: 9.1 MG/DL — SIGNIFICANT CHANGE UP (ref 8.4–10.5)
CHLORIDE SERPL-SCNC: 98 MMOL/L — SIGNIFICANT CHANGE UP (ref 96–108)
CO2 SERPL-SCNC: 28 MMOL/L — SIGNIFICANT CHANGE UP (ref 22–31)
CREAT SERPL-MCNC: 2.99 MG/DL — HIGH (ref 0.5–1.3)
GLUCOSE BLDC GLUCOMTR-MCNC: 167 MG/DL — HIGH (ref 70–99)
GLUCOSE BLDC GLUCOMTR-MCNC: 193 MG/DL — HIGH (ref 70–99)
GLUCOSE BLDC GLUCOMTR-MCNC: 249 MG/DL — HIGH (ref 70–99)
GLUCOSE BLDC GLUCOMTR-MCNC: 255 MG/DL — HIGH (ref 70–99)
GLUCOSE SERPL-MCNC: 265 MG/DL — HIGH (ref 70–99)
HCT VFR BLD CALC: 27.5 % — LOW (ref 39–50)
HGB BLD-MCNC: 8.4 G/DL — LOW (ref 13–17)
LYMPHOCYTES # BLD AUTO: 10.3 % — LOW (ref 13–44)
MCHC RBC-ENTMCNC: 30.5 G/DL — LOW (ref 32–36)
MCHC RBC-ENTMCNC: 31.5 PG — SIGNIFICANT CHANGE UP (ref 27–34)
MCV RBC AUTO: 103 FL — HIGH (ref 80–100)
MONOCYTES NFR BLD AUTO: 8.4 % — SIGNIFICANT CHANGE UP (ref 2–14)
NEUTROPHILS NFR BLD AUTO: 81.1 % — HIGH (ref 43–77)
PLATELET # BLD AUTO: 52 K/UL — LOW (ref 150–400)
POTASSIUM SERPL-MCNC: 5.3 MMOL/L — SIGNIFICANT CHANGE UP (ref 3.5–5.3)
POTASSIUM SERPL-SCNC: 5.3 MMOL/L — SIGNIFICANT CHANGE UP (ref 3.5–5.3)
RBC # BLD: 2.67 M/UL — LOW (ref 4.2–5.8)
RBC # FLD: 18 % — HIGH (ref 10.3–16.9)
SODIUM SERPL-SCNC: 139 MMOL/L — SIGNIFICANT CHANGE UP (ref 135–145)
WBC # BLD: 4.3 K/UL — SIGNIFICANT CHANGE UP (ref 3.8–10.5)
WBC # FLD AUTO: 4.3 K/UL — SIGNIFICANT CHANGE UP (ref 3.8–10.5)

## 2018-04-08 PROCEDURE — 99233 SBSQ HOSP IP/OBS HIGH 50: CPT | Mod: GC

## 2018-04-08 PROCEDURE — 36589 REMOVAL TUNNELED CV CATH: CPT

## 2018-04-08 PROCEDURE — 99232 SBSQ HOSP IP/OBS MODERATE 35: CPT

## 2018-04-08 RX ORDER — HYDROCORTISONE 20 MG
25 TABLET ORAL EVERY 24 HOURS
Qty: 0 | Refills: 0 | Status: DISCONTINUED | OUTPATIENT
Start: 2018-04-09 | End: 2018-04-14

## 2018-04-08 RX ORDER — SODIUM CHLORIDE 9 MG/ML
1000 INJECTION, SOLUTION INTRAVENOUS
Qty: 0 | Refills: 0 | Status: DISCONTINUED | OUTPATIENT
Start: 2018-04-08 | End: 2018-06-21

## 2018-04-08 RX ORDER — INSULIN LISPRO 100/ML
VIAL (ML) SUBCUTANEOUS
Qty: 0 | Refills: 0 | Status: DISCONTINUED | OUTPATIENT
Start: 2018-04-08 | End: 2018-04-09

## 2018-04-08 RX ORDER — MICAFUNGIN SODIUM 100 MG/1
100 INJECTION, POWDER, LYOPHILIZED, FOR SOLUTION INTRAVENOUS EVERY 24 HOURS
Qty: 0 | Refills: 0 | Status: DISCONTINUED | OUTPATIENT
Start: 2018-04-10 | End: 2018-04-12

## 2018-04-08 RX ORDER — GLUCAGON INJECTION, SOLUTION 0.5 MG/.1ML
1 INJECTION, SOLUTION SUBCUTANEOUS ONCE
Qty: 0 | Refills: 0 | Status: DISCONTINUED | OUTPATIENT
Start: 2018-04-08 | End: 2018-06-21

## 2018-04-08 RX ORDER — MICAFUNGIN SODIUM 100 MG/1
100 INJECTION, POWDER, LYOPHILIZED, FOR SOLUTION INTRAVENOUS EVERY 24 HOURS
Qty: 0 | Refills: 0 | Status: COMPLETED | OUTPATIENT
Start: 2018-04-08 | End: 2018-04-09

## 2018-04-08 RX ORDER — DEXTROSE 50 % IN WATER 50 %
25 SYRINGE (ML) INTRAVENOUS ONCE
Qty: 0 | Refills: 0 | Status: DISCONTINUED | OUTPATIENT
Start: 2018-04-08 | End: 2018-06-21

## 2018-04-08 RX ORDER — HUMAN INSULIN 100 [IU]/ML
7 INJECTION, SUSPENSION SUBCUTANEOUS ONCE
Qty: 0 | Refills: 0 | Status: COMPLETED | OUTPATIENT
Start: 2018-04-08 | End: 2018-04-08

## 2018-04-08 RX ORDER — INSULIN GLARGINE 100 [IU]/ML
20 INJECTION, SOLUTION SUBCUTANEOUS AT BEDTIME
Qty: 0 | Refills: 0 | Status: DISCONTINUED | OUTPATIENT
Start: 2018-04-08 | End: 2018-04-09

## 2018-04-08 RX ORDER — DEXTROSE 50 % IN WATER 50 %
12.5 SYRINGE (ML) INTRAVENOUS ONCE
Qty: 0 | Refills: 0 | Status: DISCONTINUED | OUTPATIENT
Start: 2018-04-08 | End: 2018-06-21

## 2018-04-08 RX ORDER — DEXTROSE 50 % IN WATER 50 %
1 SYRINGE (ML) INTRAVENOUS ONCE
Qty: 0 | Refills: 0 | Status: DISCONTINUED | OUTPATIENT
Start: 2018-04-08 | End: 2018-06-21

## 2018-04-08 RX ORDER — INSULIN LISPRO 100/ML
7 VIAL (ML) SUBCUTANEOUS EVERY 6 HOURS
Qty: 0 | Refills: 0 | Status: DISCONTINUED | OUTPATIENT
Start: 2018-04-08 | End: 2018-04-09

## 2018-04-08 RX ADMIN — MIDODRINE HYDROCHLORIDE 15 MILLIGRAM(S): 2.5 TABLET ORAL at 01:44

## 2018-04-08 RX ADMIN — Medication 6: at 07:08

## 2018-04-08 RX ADMIN — Medication 4: at 12:42

## 2018-04-08 RX ADMIN — CHLORHEXIDINE GLUCONATE 15 MILLILITER(S): 213 SOLUTION TOPICAL at 22:28

## 2018-04-08 RX ADMIN — HUMAN INSULIN 7 UNIT(S): 100 INJECTION, SUSPENSION SUBCUTANEOUS at 12:41

## 2018-04-08 RX ADMIN — Medication 7 UNIT(S): at 12:42

## 2018-04-08 RX ADMIN — MODAFINIL 100 MILLIGRAM(S): 200 TABLET ORAL at 12:41

## 2018-04-08 RX ADMIN — ERGOCALCIFEROL 6000 UNIT(S): 1.25 CAPSULE ORAL at 12:43

## 2018-04-08 RX ADMIN — Medication 650 MILLIGRAM(S): at 06:59

## 2018-04-08 RX ADMIN — Medication 100 MILLIGRAM(S): at 12:41

## 2018-04-08 RX ADMIN — Medication 25 MILLIGRAM(S): at 06:15

## 2018-04-08 RX ADMIN — INSULIN GLARGINE 20 UNIT(S): 100 INJECTION, SOLUTION SUBCUTANEOUS at 22:30

## 2018-04-08 RX ADMIN — Medication 2: at 22:38

## 2018-04-08 RX ADMIN — Medication 650 MILLIGRAM(S): at 07:19

## 2018-04-08 RX ADMIN — ESCITALOPRAM OXALATE 5 MILLIGRAM(S): 10 TABLET, FILM COATED ORAL at 12:44

## 2018-04-08 RX ADMIN — CHLORHEXIDINE GLUCONATE 15 MILLILITER(S): 213 SOLUTION TOPICAL at 10:31

## 2018-04-08 RX ADMIN — Medication 2: at 17:48

## 2018-04-08 RX ADMIN — Medication 1 TABLET(S): at 12:41

## 2018-04-08 RX ADMIN — MICAFUNGIN SODIUM 105 MILLIGRAM(S): 100 INJECTION, POWDER, LYOPHILIZED, FOR SOLUTION INTRAVENOUS at 01:09

## 2018-04-08 RX ADMIN — MODAFINIL 100 MILLIGRAM(S): 200 TABLET ORAL at 06:14

## 2018-04-08 RX ADMIN — Medication 7 UNIT(S): at 17:48

## 2018-04-08 RX ADMIN — PANTOPRAZOLE SODIUM 40 MILLIGRAM(S): 20 TABLET, DELAYED RELEASE ORAL at 12:41

## 2018-04-08 RX ADMIN — Medication 1 MILLIGRAM(S): at 12:41

## 2018-04-08 RX ADMIN — MIDODRINE HYDROCHLORIDE 15 MILLIGRAM(S): 2.5 TABLET ORAL at 17:47

## 2018-04-08 RX ADMIN — MIDODRINE HYDROCHLORIDE 15 MILLIGRAM(S): 2.5 TABLET ORAL at 10:31

## 2018-04-08 NOTE — PROGRESS NOTE ADULT - PROBLEM SELECTOR PLAN 1
Patient is a 63  year old male with acute on chronic renal failure from ischemic ATN. Patient is currently requiring hemodialysis. Patient was last dialyzed 4/7 with 2.8 L of UF     P - Patient does not require emergent dialysis today as electrolytes are acceptable   will reevaluate for HD tomorrow

## 2018-04-08 NOTE — PROGRESS NOTE ADULT - PROBLEM SELECTOR PLAN 2
Acute on chronic systolic CHF with LVEF 15 % and severely low blood pressure  Please concentrate as many IV fluid drips as possible

## 2018-04-08 NOTE — PROGRESS NOTE ADULT - SUBJECTIVE AND OBJECTIVE BOX
Patient seen and examined at bedside. Patient is a 63 year old male with a history of HFrEF 10-15% due to ischemic cardiomyopathy, s/p AICD, ?atrial fibrillation, hypertension, diabetes mellitus type 2, gout, and CKD for whom nephrology was called for GILMER from ATN requiring hemodialysis. Patient was dialyzed for UF only on 4/7 with UF of 2.8L.     acetaminophen    Suspension. 650 milliGRAM(s) every 6 hours PRN  chlorhexidine 0.12% Liquid 15 milliLiter(s) two times a day  chlorhexidine 2% Cloths 1 Application(s) daily  dextrose 5%. 1000 milliLiter(s) <Continuous>  dextrose 50% Injectable 12.5 Gram(s) once  dextrose 50% Injectable 25 Gram(s) once  dextrose 50% Injectable 25 Gram(s) once  dextrose Gel 1 Dose(s) once PRN  ergocalciferol Drops 6000 Unit(s) daily  escitalopram 5 milliGRAM(s) daily  folic acid 1 milliGRAM(s) daily  glucagon  Injectable 1 milliGRAM(s) once PRN  hydrocortisone sodium succinate Injectable 25 milliGRAM(s) every 12 hours  insulin glargine Injectable (LANTUS) 20 Unit(s) at bedtime  insulin lispro (HumaLOG) corrective regimen sliding scale   Before meals and at bedtime  insulin lispro Injectable (HumaLOG) 7 Unit(s) every 6 hours  micafungin IVPB 100 milliGRAM(s) every 24 hours  micafungin IVPB 100 milliGRAM(s) every 24 hours  midodrine 15 milliGRAM(s) every 8 hours  modafinil 100 milliGRAM(s) <User Schedule>  multivitamin 1 Tablet(s) daily  pantoprazole   Suspension 40 milliGRAM(s) daily  sodium chloride 0.9% lock flush 10 milliLiter(s) every 1 hour PRN  sodium chloride 0.9% lock flush 10 milliLiter(s) every 12 hours PRN  sodium chloride 0.9% lock flush 20 milliLiter(s) once  thiamine 100 milliGRAM(s) daily    Allergies    No Known Allergies    Intolerances    T(C): , Max: 38.3 (04-08-18 @ 06:00)  T(F): , Max: 101 (04-08-18 @ 06:00)  HR: 91 (04-08-18 @ 12:16)  BP: 85/64 (04-08-18 @ 11:00)  BP(mean): 71 (04-08-18 @ 11:00)  RR: 18 (04-08-18 @ 12:16)  SpO2: 100% (04-08-18 @ 12:16)    04-07 @ 07:01  -  04-08 @ 07:00  --------------------------------------------------------  IN:    Albumin 25%: 150 mL    Enteral Tube Flush: 240 mL    TwoCal HN: 1150 mL  Total IN: 1540 mL    OUT:    Other: 2800 mL  Total OUT: 2800 mL    Total NET: -1260 mL    04-08 @ 07:01  -  04-08 @ 12:47  --------------------------------------------------------  IN:    TwoCal HN: 100 mL  Total IN: 100 mL    OUT:  Total OUT: 0 mL    Total NET: 100 mL    LABS:                        8.4    4.3   )-----------( 52       ( 08 Apr 2018 05:44 )             27.5     04-08    139  |  98  |  54<H>  ----------------------------<  265<H>  5.3   |  28  |  2.99<H>    Ca    9.1      08 Apr 2018 05:44  Phos  1.2     04-07  Mg     2.0     04-07    TPro  6.5  /  Alb  3.6  /  TBili  1.1  /  DBili  x   /  AST  29  /  ALT  73<H>  /  AlkPhos  246<H>  04-07

## 2018-04-08 NOTE — PROGRESS NOTE ADULT - ATTENDING COMMENTS
Patient seen and examined with house-staff during bedside rounds.  Resident note read, including vitals, physical findings, laboratory data, and radiological reports.   Revisions included below.  Direct personal management at bed side and extensive interpretation of the data.  Plan was outlined and discussed in details with the housestaff.  Decision making of high complexity  Action taken for acute disease activity to reflect the level of care provided:  - medication reconciliation  - review laboratory data  - started mycofungin for candidaemia  - Remove Chase  - Continue T collar trials  - Off norepinephrine  - No need for HD today  - Monitor the pleural effusions  - Insulin adjusted and control the BS  - Decrease steroids   -

## 2018-04-08 NOTE — PROGRESS NOTE ADULT - SUBJECTIVE AND OBJECTIVE BOX
INTERVAL HPI/OVERNIGHT EVENTS: Blood cx growing yeast, started on micafungin.     SUBJECTIVE: Patient seen and examined at bedside. Laying in bed in NAD, nods intermittently to questions. Not following commands/ opening eyes.     OBJECTIVE:    VITAL SIGNS:  ICU Vital Signs Last 24 Hrs  T(C): 37.8 (08 Apr 2018 10:09), Max: 38.3 (08 Apr 2018 06:00)  T(F): 100 (08 Apr 2018 10:09), Max: 101 (08 Apr 2018 06:00)  HR: 90 (08 Apr 2018 10:00) (79 - 108)  BP: 91/74 (08 Apr 2018 10:00) (72/57 - 105/80)  BP(mean): 80 (08 Apr 2018 10:00) (62 - 89)  ABP: --  ABP(mean): --  RR: 19 (08 Apr 2018 10:00) (0 - 29)  SpO2: 100% (08 Apr 2018 10:00) (92% - 100%)    Mode: CPAP with PS, FiO2: 40, PEEP: 5, PS: 5, MAP: 7.3, PIP: 10    04-07 @ 07:01  -  04-08 @ 07:00  --------------------------------------------------------  IN: 1540 mL / OUT: 2800 mL / NET: -1260 mL    04-08 @ 07:01 - 04-08 @ 10:52  --------------------------------------------------------  IN: 100 mL / OUT: 0 mL / NET: 100 mL      CAPILLARY BLOOD GLUCOSE      POCT Blood Glucose.: 255 mg/dL (08 Apr 2018 06:58)      PHYSICAL EXAM:    General: NAD  HEENT: NC/AT; clear conjunctiva  Neck: +trach w/ some oozing of blood.   Respiratory: CTA b/l, no w/r/r  Cardiovascular: +S1/S2; RRR, no mr/r/g  Abdomen: soft, NT/ND; +BS  Extremities: WWP, 2+ peripheral pulses b/l; 2+ LE edema  Skin: Chronic venous stasis changes of lower extremities    MEDICATIONS:  MEDICATIONS  (STANDING):  chlorhexidine 0.12% Liquid 15 milliLiter(s) Swish and Spit two times a day  chlorhexidine 2% Cloths 1 Application(s) Topical daily  dextrose 5%. 1000 milliLiter(s) (50 mL/Hr) IV Continuous <Continuous>  dextrose 50% Injectable 12.5 Gram(s) IV Push once  dextrose 50% Injectable 25 Gram(s) IV Push once  dextrose 50% Injectable 25 Gram(s) IV Push once  ergocalciferol Drops 6000 Unit(s) Oral daily  escitalopram 5 milliGRAM(s) Oral daily  folic acid 1 milliGRAM(s) Oral daily  hydrocortisone sodium succinate Injectable 25 milliGRAM(s) IV Push every 12 hours  insulin glargine Injectable (LANTUS) 20 Unit(s) SubCutaneous at bedtime  insulin lispro (HumaLOG) corrective regimen sliding scale   SubCutaneous Before meals and at bedtime  insulin lispro Injectable (HumaLOG) 7 Unit(s) SubCutaneous every 6 hours  micafungin IVPB 100 milliGRAM(s) IV Intermittent every 24 hours  micafungin IVPB 100 milliGRAM(s) IV Intermittent every 24 hours  midodrine 15 milliGRAM(s) Oral every 8 hours  modafinil 100 milliGRAM(s) Oral <User Schedule>  multivitamin 1 Tablet(s) Oral daily  norepinephrine Infusion 0.01 MICROgram(s)/kG/Min (1.5 mL/Hr) IV Continuous <Continuous>  pantoprazole   Suspension 40 milliGRAM(s) Oral daily  sodium chloride 0.9% lock flush 20 milliLiter(s) IV Push once  thiamine 100 milliGRAM(s) Oral daily    MEDICATIONS  (PRN):  acetaminophen    Suspension. 650 milliGRAM(s) Oral every 6 hours PRN Moderate Pain (4 - 6)  dextrose Gel 1 Dose(s) Oral once PRN Blood Glucose LESS THAN 70 milliGRAM(s)/deciliter  glucagon  Injectable 1 milliGRAM(s) IntraMuscular once PRN Glucose LESS THAN 70 milligrams/deciliter  sodium chloride 0.9% lock flush 10 milliLiter(s) IV Push every 1 hour PRN After each medication administration  sodium chloride 0.9% lock flush 10 milliLiter(s) IV Push every 12 hours PRN Lumen of catheter NOT used      ALLERGIES:  Allergies    No Known Allergies    Intolerances        LABS:                        8.4    4.3   )-----------( 52       ( 08 Apr 2018 05:44 )             27.5     04-08    139  |  98  |  54<H>  ----------------------------<  265<H>  5.3   |  28  |  2.99<H>    Ca    9.1      08 Apr 2018 05:44  Phos  1.2     04-07  Mg     2.0     04-07    TPro  6.5  /  Alb  3.6  /  TBili  1.1  /  DBili  x   /  AST  29  /  ALT  73<H>  /  AlkPhos  246<H>  04-07          RADIOLOGY & ADDITIONAL TESTS: Reviewed.

## 2018-04-08 NOTE — PROGRESS NOTE ADULT - ATTENDING COMMENTS
s/p 3L UF yesterday with good tolerance on HD, mild levo support req transiently, no RRT today, still anuric, lytes stable s/p 3L UF yesterday with good tolerance on HD, mild levo support req transiently, no RRT today, still anuric, lytes stable. Fungemia, removing HD catheter for line holiday

## 2018-04-09 LAB
ANION GAP SERPL CALC-SCNC: 16 MMOL/L — SIGNIFICANT CHANGE UP (ref 5–17)
ANION GAP SERPL CALC-SCNC: 17 MMOL/L — SIGNIFICANT CHANGE UP (ref 5–17)
BUN SERPL-MCNC: 78 MG/DL — HIGH (ref 7–23)
BUN SERPL-MCNC: 79 MG/DL — HIGH (ref 7–23)
CALCIUM SERPL-MCNC: 9.6 MG/DL — SIGNIFICANT CHANGE UP (ref 8.4–10.5)
CALCIUM SERPL-MCNC: 9.6 MG/DL — SIGNIFICANT CHANGE UP (ref 8.4–10.5)
CHLORIDE SERPL-SCNC: 95 MMOL/L — LOW (ref 96–108)
CHLORIDE SERPL-SCNC: 97 MMOL/L — SIGNIFICANT CHANGE UP (ref 96–108)
CO2 SERPL-SCNC: 24 MMOL/L — SIGNIFICANT CHANGE UP (ref 22–31)
CO2 SERPL-SCNC: 25 MMOL/L — SIGNIFICANT CHANGE UP (ref 22–31)
CREAT SERPL-MCNC: 3.79 MG/DL — HIGH (ref 0.5–1.3)
CREAT SERPL-MCNC: 4.02 MG/DL — HIGH (ref 0.5–1.3)
CULTURE RESULTS: SIGNIFICANT CHANGE UP
GLUCOSE BLDC GLUCOMTR-MCNC: 126 MG/DL — HIGH (ref 70–99)
GLUCOSE BLDC GLUCOMTR-MCNC: 136 MG/DL — HIGH (ref 70–99)
GLUCOSE BLDC GLUCOMTR-MCNC: 199 MG/DL — HIGH (ref 70–99)
GLUCOSE BLDC GLUCOMTR-MCNC: 254 MG/DL — HIGH (ref 70–99)
GLUCOSE BLDC GLUCOMTR-MCNC: 71 MG/DL — SIGNIFICANT CHANGE UP (ref 70–99)
GLUCOSE BLDC GLUCOMTR-MCNC: 81 MG/DL — SIGNIFICANT CHANGE UP (ref 70–99)
GLUCOSE SERPL-MCNC: 142 MG/DL — HIGH (ref 70–99)
GLUCOSE SERPL-MCNC: 180 MG/DL — HIGH (ref 70–99)
HCT VFR BLD CALC: 32.4 % — LOW (ref 39–50)
HGB BLD-MCNC: 9.8 G/DL — LOW (ref 13–17)
MAGNESIUM SERPL-MCNC: 2.2 MG/DL — SIGNIFICANT CHANGE UP (ref 1.6–2.6)
MCHC RBC-ENTMCNC: 30.2 G/DL — LOW (ref 32–36)
MCHC RBC-ENTMCNC: 31.2 PG — SIGNIFICANT CHANGE UP (ref 27–34)
MCV RBC AUTO: 103.2 FL — HIGH (ref 80–100)
PHOSPHATE SERPL-MCNC: 1.1 MG/DL — LOW (ref 2.5–4.5)
PLATELET # BLD AUTO: 56 K/UL — LOW (ref 150–400)
POTASSIUM SERPL-MCNC: 6.3 MMOL/L — CRITICAL HIGH (ref 3.5–5.3)
POTASSIUM SERPL-MCNC: 6.4 MMOL/L — CRITICAL HIGH (ref 3.5–5.3)
POTASSIUM SERPL-SCNC: 6.3 MMOL/L — CRITICAL HIGH (ref 3.5–5.3)
POTASSIUM SERPL-SCNC: 6.4 MMOL/L — CRITICAL HIGH (ref 3.5–5.3)
RBC # BLD: 3.14 M/UL — LOW (ref 4.2–5.8)
RBC # FLD: 18.5 % — HIGH (ref 10.3–16.9)
SODIUM SERPL-SCNC: 137 MMOL/L — SIGNIFICANT CHANGE UP (ref 135–145)
SODIUM SERPL-SCNC: 137 MMOL/L — SIGNIFICANT CHANGE UP (ref 135–145)
SPECIMEN SOURCE: SIGNIFICANT CHANGE UP
WBC # BLD: 4 K/UL — SIGNIFICANT CHANGE UP (ref 3.8–10.5)
WBC # FLD AUTO: 4 K/UL — SIGNIFICANT CHANGE UP (ref 3.8–10.5)

## 2018-04-09 PROCEDURE — 71045 X-RAY EXAM CHEST 1 VIEW: CPT | Mod: 26

## 2018-04-09 PROCEDURE — 99255 IP/OBS CONSLTJ NEW/EST HI 80: CPT | Mod: GC

## 2018-04-09 PROCEDURE — 99233 SBSQ HOSP IP/OBS HIGH 50: CPT | Mod: GC

## 2018-04-09 RX ORDER — ALBUMIN HUMAN 25 %
50 VIAL (ML) INTRAVENOUS
Qty: 0 | Refills: 0 | Status: DISCONTINUED | OUTPATIENT
Start: 2018-04-09 | End: 2018-04-09

## 2018-04-09 RX ORDER — INSULIN HUMAN 100 [IU]/ML
INJECTION, SOLUTION SUBCUTANEOUS EVERY 6 HOURS
Qty: 0 | Refills: 0 | Status: DISCONTINUED | OUTPATIENT
Start: 2018-04-09 | End: 2018-04-18

## 2018-04-09 RX ORDER — INSULIN HUMAN 100 [IU]/ML
7 INJECTION, SOLUTION SUBCUTANEOUS EVERY 6 HOURS
Qty: 0 | Refills: 0 | Status: DISCONTINUED | OUTPATIENT
Start: 2018-04-09 | End: 2018-04-10

## 2018-04-09 RX ORDER — DEXTROSE 50 % IN WATER 50 %
12.5 SYRINGE (ML) INTRAVENOUS ONCE
Qty: 0 | Refills: 0 | Status: COMPLETED | OUTPATIENT
Start: 2018-04-09 | End: 2018-04-09

## 2018-04-09 RX ORDER — NOREPINEPHRINE BITARTRATE/D5W 8 MG/250ML
0.01 PLASTIC BAG, INJECTION (ML) INTRAVENOUS
Qty: 8 | Refills: 0 | Status: DISCONTINUED | OUTPATIENT
Start: 2018-04-09 | End: 2018-04-10

## 2018-04-09 RX ORDER — INSULIN GLARGINE 100 [IU]/ML
12 INJECTION, SOLUTION SUBCUTANEOUS AT BEDTIME
Qty: 0 | Refills: 0 | Status: DISCONTINUED | OUTPATIENT
Start: 2018-04-09 | End: 2018-04-10

## 2018-04-09 RX ORDER — SODIUM POLYSTYRENE SULFONATE 4.1 MEQ/G
30 POWDER, FOR SUSPENSION ORAL ONCE
Qty: 0 | Refills: 0 | Status: COMPLETED | OUTPATIENT
Start: 2018-04-09 | End: 2018-04-09

## 2018-04-09 RX ADMIN — MICAFUNGIN SODIUM 105 MILLIGRAM(S): 100 INJECTION, POWDER, LYOPHILIZED, FOR SOLUTION INTRAVENOUS at 23:04

## 2018-04-09 RX ADMIN — INSULIN GLARGINE 12 UNIT(S): 100 INJECTION, SOLUTION SUBCUTANEOUS at 22:34

## 2018-04-09 RX ADMIN — PANTOPRAZOLE SODIUM 40 MILLIGRAM(S): 20 TABLET, DELAYED RELEASE ORAL at 12:50

## 2018-04-09 RX ADMIN — MODAFINIL 100 MILLIGRAM(S): 200 TABLET ORAL at 06:23

## 2018-04-09 RX ADMIN — SODIUM POLYSTYRENE SULFONATE 30 GRAM(S): 4.1 POWDER, FOR SUSPENSION ORAL at 18:02

## 2018-04-09 RX ADMIN — CHLORHEXIDINE GLUCONATE 15 MILLILITER(S): 213 SOLUTION TOPICAL at 21:44

## 2018-04-09 RX ADMIN — Medication 12.5 GRAM(S): at 11:37

## 2018-04-09 RX ADMIN — INSULIN HUMAN 6: 100 INJECTION, SOLUTION SUBCUTANEOUS at 21:41

## 2018-04-09 RX ADMIN — Medication 7 UNIT(S): at 06:35

## 2018-04-09 RX ADMIN — Medication 100 MILLIGRAM(S): at 12:50

## 2018-04-09 RX ADMIN — ERGOCALCIFEROL 6000 UNIT(S): 1.25 CAPSULE ORAL at 12:50

## 2018-04-09 RX ADMIN — Medication 650 MILLIGRAM(S): at 15:40

## 2018-04-09 RX ADMIN — Medication 7 UNIT(S): at 00:51

## 2018-04-09 RX ADMIN — Medication 650 MILLIGRAM(S): at 12:51

## 2018-04-09 RX ADMIN — INSULIN HUMAN 7 UNIT(S): 100 INJECTION, SOLUTION SUBCUTANEOUS at 21:42

## 2018-04-09 RX ADMIN — Medication 1 TABLET(S): at 12:50

## 2018-04-09 RX ADMIN — Medication 1.5 MICROGRAM(S)/KG/MIN: at 06:43

## 2018-04-09 RX ADMIN — Medication 1 MILLIGRAM(S): at 12:50

## 2018-04-09 RX ADMIN — INSULIN HUMAN 2: 100 INJECTION, SOLUTION SUBCUTANEOUS at 18:04

## 2018-04-09 RX ADMIN — Medication 25 MILLIGRAM(S): at 06:24

## 2018-04-09 RX ADMIN — ESCITALOPRAM OXALATE 5 MILLIGRAM(S): 10 TABLET, FILM COATED ORAL at 12:50

## 2018-04-09 RX ADMIN — CHLORHEXIDINE GLUCONATE 15 MILLILITER(S): 213 SOLUTION TOPICAL at 11:37

## 2018-04-09 RX ADMIN — MIDODRINE HYDROCHLORIDE 15 MILLIGRAM(S): 2.5 TABLET ORAL at 12:49

## 2018-04-09 RX ADMIN — MODAFINIL 100 MILLIGRAM(S): 200 TABLET ORAL at 12:50

## 2018-04-09 RX ADMIN — MICAFUNGIN SODIUM 105 MILLIGRAM(S): 100 INJECTION, POWDER, LYOPHILIZED, FOR SOLUTION INTRAVENOUS at 00:50

## 2018-04-09 RX ADMIN — INSULIN HUMAN 7 UNIT(S): 100 INJECTION, SOLUTION SUBCUTANEOUS at 18:03

## 2018-04-09 RX ADMIN — MIDODRINE HYDROCHLORIDE 15 MILLIGRAM(S): 2.5 TABLET ORAL at 02:23

## 2018-04-09 RX ADMIN — MIDODRINE HYDROCHLORIDE 15 MILLIGRAM(S): 2.5 TABLET ORAL at 18:03

## 2018-04-09 NOTE — PROGRESS NOTE ADULT - NEUROLOGICAL
negative
detailed exam
negative
detailed exam
detailed exam
negative

## 2018-04-09 NOTE — CONSULT NOTE ADULT - ASSESSMENT
64 yo male with ICM s/p ICD, prolonged hospitalization for LLE cellulitis, with course c/b ATN requiring initiation of HD, now with new fever and candidemia. The most likely source is the Permacath (which was removed yesterday). He has an ICD that may also be infected.  - f/u repeat bcx  - f/u susceptibilities of C. tropicalis bcx isolate (being performed at [facility])  - would pursue TTE, and if negative RUKHSANA, to assess for IE/ICD lead involvement; low threshold for removal if patient becomes unstable or has recurrent candidemia  - ophthalmology exam to r/o Candida endophthalmitis   - continue micafungin 100mg IV q24h  - would defer replacement of Permacath until bcx have been negative X 48h and deep-seated infection has been ruled out; temporary HD access may be used in the meantime

## 2018-04-09 NOTE — CONSULT NOTE ADULT - SUBJECTIVE AND OBJECTIVE BOX
HPI:  64 yo M history of HFrEF 10-15% 2/2 ischemic cardiomyopathy, MI , s/p AICD vs PPM, ?Afib, Hypertension, Diabetes Mellitus Type 2 on insulin, CKD?and gout, who presents with a chief complaint of generalized weakness. Pt giana admitted to [facility] 2 months ago for gout and was sent to rehab. He was discharged home mid Feb with VNS. In the past 2 weeks patient has noted increased leg pain and weakness bilaterally. In the last few days, he has also experienced generalized weakness prompting him to come to ER.   In ER, noted to have t max of 102, SBP 70s, leukocytosis to 32.8, Lactate of 6.6, Acute renal failure with severe metabolic derangements. Given 3.5L fluids and Vanc/Zosyn. MICU consulted. (10 Mar 2018 18:51)      PAST MEDICAL & SURGICAL HISTORY:  Type 2 diabetes mellitus with diabetic peripheral angiopathy without gangrene, with long-term curren  Essential hypertension, benign  Gout  Pacemaker  Chronic systolic heart failure  Myocardial infarction  No significant past surgical history        REVIEW OF SYSTEMS: UTO      ANTIBIOTICS:  MEDICATIONS  (STANDING):  chlorhexidine 0.12% Liquid 15 milliLiter(s) Swish and Spit two times a day  chlorhexidine 2% Cloths 1 Application(s) Topical daily  dextrose 5%. 1000 milliLiter(s) (50 mL/Hr) IV Continuous <Continuous>  dextrose 50% Injectable 12.5 Gram(s) IV Push once  dextrose 50% Injectable 25 Gram(s) IV Push once  dextrose 50% Injectable 25 Gram(s) IV Push once  ergocalciferol Drops 6000 Unit(s) Oral daily  escitalopram 5 milliGRAM(s) Oral daily  folic acid 1 milliGRAM(s) Oral daily  hydrocortisone sodium succinate Injectable 25 milliGRAM(s) IV Push every 24 hours  insulin glargine Injectable (LANTUS) 20 Unit(s) SubCutaneous at bedtime  insulin regular  human corrective regimen sliding scale   SubCutaneous every 6 hours  insulin regular  human recombinant 7 Unit(s) SubCutaneous every 6 hours  micafungin IVPB 100 milliGRAM(s) IV Intermittent every 24 hours  midodrine 15 milliGRAM(s) Oral every 8 hours  modafinil 100 milliGRAM(s) Oral <User Schedule>  multivitamin 1 Tablet(s) Oral daily  norepinephrine Infusion 0.01 MICROgram(s)/kG/Min (1.5 mL/Hr) IV Continuous <Continuous>  pantoprazole   Suspension 40 milliGRAM(s) Oral daily  thiamine 100 milliGRAM(s) Oral daily    MEDICATIONS  (PRN):  acetaminophen    Suspension. 650 milliGRAM(s) Oral every 6 hours PRN Moderate Pain (4 - 6)  dextrose Gel 1 Dose(s) Oral once PRN Blood Glucose LESS THAN 70 milliGRAM(s)/deciliter  glucagon  Injectable 1 milliGRAM(s) IntraMuscular once PRN Glucose LESS THAN 70 milligrams/deciliter      Allergies    No Known Allergies    Intolerances        SOCIAL HISTORY:    FAMILY HISTORY:      Vital Signs Last 24 Hrs  T(C): 38.3 (09 Apr 2018 18:00), Max: 38.7 (08 Apr 2018 22:36)  T(F): 100.9 (09 Apr 2018 18:00), Max: 101.7 (08 Apr 2018 22:36)  HR: 100 (09 Apr 2018 18:00) (90 - 122)  BP: 86/71 (09 Apr 2018 18:00) (75/59 - 105/80)  BP(mean): 76 (09 Apr 2018 18:00) (64 - 91)  RR: 22 (09 Apr 2018 18:00) (10 - 31)  SpO2: 100% (09 Apr 2018 18:00) (88% - 100%)    PHYSICAL EXAM:  Constitutional: chronically ill  Eyes:MAXI, EOMI  Ear/Nose/Throat: no oral lesion, no sinus tenderness on percussion	  Neck:no JVD, no lymphadenopathy, supple  Respiratory: CTA morris  Cardiovascular: S1S2 RRR, no murmurs  Gastrointestinal:soft, (+) BS, no HSM  Extremities:no e/e/c; dry skin b/l feet  Vascular: DP Pulse:	right normal; left normal            LABS:                        9.8    4.0   )-----------( 56       ( 09 Apr 2018 06:24 )             32.4     04-09    137  |  97  |  79<H>  ----------------------------<  180<H>  6.4<HH>   |  24  |  4.02<H>    Ca    9.6      09 Apr 2018 15:51  Phos  1.1     04-09  Mg     2.2     04-09            MICROBIOLOGY: Culture - Catheter (04.08.18 @ 18:45)    Specimen Source: .Catheter Catheter Tip Subclavian    Culture Results:   No growth to date    Culture - Blood (04.06.18 @ 14:35)    -  Candida tropicalis: Detec    Gram Stain:   Aerobic Bottle: Yeast like cells  Result called to and read back by_ Ms. JAE Chávez RN  04/07/2018  21:40:54  "Due to technical problems, Proteus sp. will Not be reported as part of  the BCID panel until further notice"  ***Blood Panel PCR results on this specimen are available  approximately 3 hours after the Gram stain result.***  Gram stain, PCR, and/or culture results may not always  correspond due to difference in methodologies.  ************************************************************  This PCR assay was performed using [employer].  The following targets are tested for: Enterococcus,  vancomycin resistant enterococci, Listeria monocytogenes,  coagulase negative staphylococci, S. aureus,  methicillin resistant S. aureus,Streptococcus agalactiae  (Group B), S. pneumoniae, S. pyogenes (Group A),  Acinetobacter baumannii, Enterobacter cloacae, E. coli,  Klebsiella oxytoca, K. pneumoniae, Proteus sp.,  Serratia marcescens, Haemophilus influenzae,  Neisseria meningitidis, Pseudomonas aeruginosa, Candida  albicans, C. glabrata, C krusei, C parapsilosis,  C. tropicalis and the KPC resistance gene.    Specimen Source: .Blood Blood-Peripheral    Organism: Blood Culture PCR    Culture Results:   Growth in aerobic bottle: Yeast  Pending further identification and antibiotic susceptibility testing at  [facility]    Organism Identification: Blood Culture PCR    Method Type: PCR      RADIOLOGY & ADDITIONAL STUDIES: reviewed

## 2018-04-09 NOTE — PROGRESS NOTE ADULT - PROBLEM SELECTOR PLAN 1
Patient is a 63  year old male with acute on chronic renal failure from ischemic ATN. Patient is currently requiring hemodialysis. Patient was last dialyzed 4/7 with 2.8 L of UF. Patient's catheter was removed for fungemia     P - Patient does not require emergent dialysis today as electrolytes are acceptable   Talked to patient's nurse to check a bladder scan or straight cath to see if patient has any urine output

## 2018-04-09 NOTE — PROGRESS NOTE ADULT - SUBJECTIVE AND OBJECTIVE BOX
Patient seen and examined at bedside. Patient is a 63 year old male with a history of HFrEF 10-15% due to ischemic cardiomyopathy, s/p AICD, ?atrial fibrillation, hypertension, diabetes mellitus type 2, gout, and CKD for whom nephrology was called for GILMER from ATN requiring hemodialysis. Patient had his catheter removed for fungemia. Patient was last dialyzed 4/7 with UF of 2.8L     acetaminophen    Suspension. 650 milliGRAM(s) every 6 hours PRN  chlorhexidine 0.12% Liquid 15 milliLiter(s) two times a day  chlorhexidine 2% Cloths 1 Application(s) daily  dextrose 5%. 1000 milliLiter(s) <Continuous>  dextrose 50% Injectable 12.5 Gram(s) once  dextrose 50% Injectable 25 Gram(s) once  dextrose 50% Injectable 25 Gram(s) once  dextrose Gel 1 Dose(s) once PRN  ergocalciferol Drops 6000 Unit(s) daily  escitalopram 5 milliGRAM(s) daily  folic acid 1 milliGRAM(s) daily  glucagon  Injectable 1 milliGRAM(s) once PRN  hydrocortisone sodium succinate Injectable 25 milliGRAM(s) every 24 hours  insulin glargine Injectable (LANTUS) 20 Unit(s) at bedtime  insulin lispro (HumaLOG) corrective regimen sliding scale   Before meals and at bedtime  insulin lispro Injectable (HumaLOG) 7 Unit(s) every 6 hours  micafungin IVPB 100 milliGRAM(s) every 24 hours  midodrine 15 milliGRAM(s) every 8 hours  modafinil 100 milliGRAM(s) <User Schedule>  multivitamin 1 Tablet(s) daily  norepinephrine Infusion 0.01 MICROgram(s)/kG/Min <Continuous>  pantoprazole   Suspension 40 milliGRAM(s) daily  thiamine 100 milliGRAM(s) daily    Allergies    No Known Allergies    Intolerances    T(C): , Max: 38.7 (04-08-18 @ 22:36)  T(F): , Max: 101.7 (04-08-18 @ 22:36)  HR: 110 (04-09-18 @ 10:00)  BP: 105/80 (04-09-18 @ 10:00)  BP(mean): 89 (04-09-18 @ 10:00)  RR: 31 (04-09-18 @ 10:00)  SpO2: 100% (04-09-18 @ 10:00)    04-08 @ 07:01  -  04-09 @ 07:00  --------------------------------------------------------  IN:    Enteral Tube Flush: 240 mL    norepinephrine Infusion: 40 mL    TwoCal HN: 1050 mL  Total IN: 1330 mL    OUT:  Total OUT: 0 mL    Total NET: 1330 mL    04-09 @ 07:01  -  04-09 @ 11:02  --------------------------------------------------------  IN:    norepinephrine Infusion: 6 mL    TwoCal HN: 25 mL  Total IN: 31 mL    OUT:  Total OUT: 0 mL    Total NET: 31 mL    LABS:                        9.8    4.0   )-----------( 56       ( 09 Apr 2018 06:24 )             32.4     04-08    139  |  98  |  54<H>  ----------------------------<  265<H>  5.3   |  28  |  2.99<H>    Ca    9.1      08 Apr 2018 05:44  Mg     2.2     04-09

## 2018-04-09 NOTE — CHART NOTE - NSCHARTNOTEFT_GEN_A_CORE
Pulmonary/Critical Care Fellow    Candidemia noted in blood cultures with fevers and bandemia.  Source probably intaabdominal with evidence of cholelithiasis.  Treating with Micafungin.  ID consulted.

## 2018-04-09 NOTE — PROGRESS NOTE ADULT - SUBJECTIVE AND OBJECTIVE BOX
INTERVAL HPI/OVERNIGHT EVENTS:    SUBJECTIVE: Patient seen and examined at bedside.    OBJECTIVE:    VITAL SIGNS:  ICU Vital Signs Last 24 Hrs  T(C): 36.6 (09 Apr 2018 06:02), Max: 38.7 (08 Apr 2018 22:36)  T(F): 97.8 (09 Apr 2018 06:02), Max: 101.7 (08 Apr 2018 22:36)  HR: 104 (09 Apr 2018 08:00) (88 - 116)  BP: 89/65 (09 Apr 2018 08:00) (75/59 - 101/82)  BP(mean): 73 (09 Apr 2018 08:00) (64 - 89)  ABP: --  ABP(mean): --  RR: 27 (09 Apr 2018 08:00) (11 - 29)  SpO2: 100% (09 Apr 2018 08:00) (88% - 100%)    Mode: CPAP with PS, FiO2: 40, PEEP: 5, PS: 5, MAP: 7.3, PIP: 11    04-08 @ 07:01  -  04-09 @ 07:00  --------------------------------------------------------  IN: 1330 mL / OUT: 0 mL / NET: 1330 mL    04-09 @ 07:01  -  04-09 @ 09:13  --------------------------------------------------------  IN: 54 mL / OUT: 0 mL / NET: 54 mL      CAPILLARY BLOOD GLUCOSE      POCT Blood Glucose.: 126 mg/dL (09 Apr 2018 07:26)      PHYSICAL EXAM:    General: NAD  HEENT: NC/AT; PERRL, clear conjunctiva  Neck: supple  Respiratory: CTA b/l  Cardiovascular: +S1/S2; RRR  Abdomen: soft, NT/ND; +BS x4  Extremities: WWP, 2+ peripheral pulses b/l; no LE edema  Skin: normal color and turgor; no rash  Neurological:     MEDICATIONS:  MEDICATIONS  (STANDING):  albumin human 25% IVPB 50 milliLiter(s) IV Intermittent every 1 hour  chlorhexidine 0.12% Liquid 15 milliLiter(s) Swish and Spit two times a day  chlorhexidine 2% Cloths 1 Application(s) Topical daily  dextrose 5%. 1000 milliLiter(s) (50 mL/Hr) IV Continuous <Continuous>  dextrose 50% Injectable 12.5 Gram(s) IV Push once  dextrose 50% Injectable 25 Gram(s) IV Push once  dextrose 50% Injectable 25 Gram(s) IV Push once  ergocalciferol Drops 6000 Unit(s) Oral daily  escitalopram 5 milliGRAM(s) Oral daily  folic acid 1 milliGRAM(s) Oral daily  hydrocortisone sodium succinate Injectable 25 milliGRAM(s) IV Push every 24 hours  insulin glargine Injectable (LANTUS) 20 Unit(s) SubCutaneous at bedtime  insulin lispro (HumaLOG) corrective regimen sliding scale   SubCutaneous Before meals and at bedtime  insulin lispro Injectable (HumaLOG) 7 Unit(s) SubCutaneous every 6 hours  micafungin IVPB 100 milliGRAM(s) IV Intermittent every 24 hours  midodrine 15 milliGRAM(s) Oral every 8 hours  modafinil 100 milliGRAM(s) Oral <User Schedule>  multivitamin 1 Tablet(s) Oral daily  norepinephrine Infusion 0.01 MICROgram(s)/kG/Min (1.5 mL/Hr) IV Continuous <Continuous>  pantoprazole   Suspension 40 milliGRAM(s) Oral daily  sodium chloride 0.9% lock flush 20 milliLiter(s) IV Push once  thiamine 100 milliGRAM(s) Oral daily    MEDICATIONS  (PRN):  acetaminophen    Suspension. 650 milliGRAM(s) Oral every 6 hours PRN Moderate Pain (4 - 6)  dextrose Gel 1 Dose(s) Oral once PRN Blood Glucose LESS THAN 70 milliGRAM(s)/deciliter  glucagon  Injectable 1 milliGRAM(s) IntraMuscular once PRN Glucose LESS THAN 70 milligrams/deciliter  sodium chloride 0.9% lock flush 10 milliLiter(s) IV Push every 1 hour PRN After each medication administration  sodium chloride 0.9% lock flush 10 milliLiter(s) IV Push every 12 hours PRN Lumen of catheter NOT used      ALLERGIES:  Allergies    No Known Allergies    Intolerances        LABS:                        9.8    4.0   )-----------( 56       ( 09 Apr 2018 06:24 )             32.4     04-08    139  |  98  |  54<H>  ----------------------------<  265<H>  5.3   |  28  |  2.99<H>    Ca    9.1      08 Apr 2018 05:44  Mg     2.2     04-09            RADIOLOGY & ADDITIONAL TESTS: Reviewed.    ASSESSMENT:     PLAN:    FEN - no IVF; replete lyaparna prn; NPO    PPx - HSQ    CODE - FULL.    DISPO - continue telemetry monitoring in ICU-step down level of care on 7lachman. INTERVAL HPI/OVERNIGHT EVENTS: Febrile Tm 101.1    SUBJECTIVE: Patient seen and examined at bedside.    OBJECTIVE:    VITAL SIGNS:  ICU Vital Signs Last 24 Hrs  T(C): 36.6 (09 Apr 2018 06:02), Max: 38.7 (08 Apr 2018 22:36)  T(F): 97.8 (09 Apr 2018 06:02), Max: 101.7 (08 Apr 2018 22:36)  HR: 104 (09 Apr 2018 08:00) (88 - 116)  BP: 89/65 (09 Apr 2018 08:00) (75/59 - 101/82)  BP(mean): 73 (09 Apr 2018 08:00) (64 - 89)  ABP: --  ABP(mean): --  RR: 27 (09 Apr 2018 08:00) (11 - 29)  SpO2: 100% (09 Apr 2018 08:00) (88% - 100%)    Mode: CPAP with PS, FiO2: 40, PEEP: 5, PS: 5, MAP: 7.3, PIP: 11    04-08 @ 07:01  -  04-09 @ 07:00  --------------------------------------------------------  IN: 1330 mL / OUT: 0 mL / NET: 1330 mL    04-09 @ 07:01  -  04-09 @ 09:13  --------------------------------------------------------  IN: 54 mL / OUT: 0 mL / NET: 54 mL      CAPILLARY BLOOD GLUCOSE      POCT Blood Glucose.: 126 mg/dL (09 Apr 2018 07:26)      PHYSICAL EXAM:    General: NAD  HEENT: NC/AT; PERRL, clear conjunctiva  Neck: supple  Respiratory: CTA b/l  Cardiovascular: +S1/S2; RRR  Abdomen: soft, NT/ND; +BS x4  Extremities: WWP, 2+ peripheral pulses b/l; no LE edema  Skin: normal color and turgor; no rash  Neurological:     MEDICATIONS:  MEDICATIONS  (STANDING):  albumin human 25% IVPB 50 milliLiter(s) IV Intermittent every 1 hour  chlorhexidine 0.12% Liquid 15 milliLiter(s) Swish and Spit two times a day  chlorhexidine 2% Cloths 1 Application(s) Topical daily  dextrose 5%. 1000 milliLiter(s) (50 mL/Hr) IV Continuous <Continuous>  dextrose 50% Injectable 12.5 Gram(s) IV Push once  dextrose 50% Injectable 25 Gram(s) IV Push once  dextrose 50% Injectable 25 Gram(s) IV Push once  ergocalciferol Drops 6000 Unit(s) Oral daily  escitalopram 5 milliGRAM(s) Oral daily  folic acid 1 milliGRAM(s) Oral daily  hydrocortisone sodium succinate Injectable 25 milliGRAM(s) IV Push every 24 hours  insulin glargine Injectable (LANTUS) 20 Unit(s) SubCutaneous at bedtime  insulin lispro (HumaLOG) corrective regimen sliding scale   SubCutaneous Before meals and at bedtime  insulin lispro Injectable (HumaLOG) 7 Unit(s) SubCutaneous every 6 hours  micafungin IVPB 100 milliGRAM(s) IV Intermittent every 24 hours  midodrine 15 milliGRAM(s) Oral every 8 hours  modafinil 100 milliGRAM(s) Oral <User Schedule>  multivitamin 1 Tablet(s) Oral daily  norepinephrine Infusion 0.01 MICROgram(s)/kG/Min (1.5 mL/Hr) IV Continuous <Continuous>  pantoprazole   Suspension 40 milliGRAM(s) Oral daily  sodium chloride 0.9% lock flush 20 milliLiter(s) IV Push once  thiamine 100 milliGRAM(s) Oral daily    MEDICATIONS  (PRN):  acetaminophen    Suspension. 650 milliGRAM(s) Oral every 6 hours PRN Moderate Pain (4 - 6)  dextrose Gel 1 Dose(s) Oral once PRN Blood Glucose LESS THAN 70 milliGRAM(s)/deciliter  glucagon  Injectable 1 milliGRAM(s) IntraMuscular once PRN Glucose LESS THAN 70 milligrams/deciliter  sodium chloride 0.9% lock flush 10 milliLiter(s) IV Push every 1 hour PRN After each medication administration  sodium chloride 0.9% lock flush 10 milliLiter(s) IV Push every 12 hours PRN Lumen of catheter NOT used      ALLERGIES:  Allergies    No Known Allergies    Intolerances        LABS:                        9.8    4.0   )-----------( 56       ( 09 Apr 2018 06:24 )             32.4     04-08    139  |  98  |  54<H>  ----------------------------<  265<H>  5.3   |  28  |  2.99<H>    Ca    9.1      08 Apr 2018 05:44  Mg     2.2     04-09            RADIOLOGY & ADDITIONAL TESTS: Reviewed.    ASSESSMENT:     PLAN:    FEN - no IVF; replete rafiq prn; NPO    PPx - HSQ    CODE - FULL.    DISPO - continue telemetry monitoring in ICU-step down level of care on 7laman. INTERVAL HPI/OVERNIGHT EVENTS: Febrile Tm 101.1. Requiring levophed for MAP <65.     	SUBJECTIVE: Patient seen and examined at bedside. Somnolent and more difficult to arouse. Not opening eyes spontaneously.     OBJECTIVE:    VITAL SIGNS:  ICU Vital Signs Last 24 Hrs  T(C): 36.6 (09 Apr 2018 06:02), Max: 38.7 (08 Apr 2018 22:36)  T(F): 97.8 (09 Apr 2018 06:02), Max: 101.7 (08 Apr 2018 22:36)  HR: 104 (09 Apr 2018 08:00) (88 - 116)  BP: 89/65 (09 Apr 2018 08:00) (75/59 - 101/82)  BP(mean): 73 (09 Apr 2018 08:00) (64 - 89)  RR: 27 (09 Apr 2018 08:00) (11 - 29)  SpO2: 100% (09 Apr 2018 08:00) (88% - 100%)    Mode: CPAP with PS, FiO2: 40, PEEP: 5, PS: 5, MAP: 7.3, PIP: 11    04-08 @ 07:01  -  04-09 @ 07:00  --------------------------------------------------------  IN: 1330 mL / OUT: 0 mL / NET: 1330 mL    04-09 @ 07:01  -  04-09 @ 09:13  --------------------------------------------------------  IN: 54 mL / OUT: 0 mL / NET: 54 mL      CAPILLARY BLOOD GLUCOSE      POCT Blood Glucose.: 126 mg/dL (09 Apr 2018 07:26)      PHYSICAL EXAM:    General: Laying bed, uncomfortable appearing. NAD  HEENT: NC/AT;   Neck: + trach in place, oozing clear-yellow secretions.   Respiratory: +rhonchi b/l, no wheezing. On CPAP.   Cardiovascular: +S1/S2; RRR, no m/r/g  Abdomen: soft, NT/ND; +PEG in place, dressing c/d/i  Extremities: WWP, 2+ peripheral pulses, 2+ dependent edema  Skin: normal color and turgor; no rash      MEDICATIONS:  MEDICATIONS  (STANDING):  albumin human 25% IVPB 50 milliLiter(s) IV Intermittent every 1 hour  chlorhexidine 0.12% Liquid 15 milliLiter(s) Swish and Spit two times a day  chlorhexidine 2% Cloths 1 Application(s) Topical daily  dextrose 5%. 1000 milliLiter(s) (50 mL/Hr) IV Continuous <Continuous>  dextrose 50% Injectable 12.5 Gram(s) IV Push once  dextrose 50% Injectable 25 Gram(s) IV Push once  dextrose 50% Injectable 25 Gram(s) IV Push once  ergocalciferol Drops 6000 Unit(s) Oral daily  escitalopram 5 milliGRAM(s) Oral daily  folic acid 1 milliGRAM(s) Oral daily  hydrocortisone sodium succinate Injectable 25 milliGRAM(s) IV Push every 24 hours  insulin glargine Injectable (LANTUS) 20 Unit(s) SubCutaneous at bedtime  insulin lispro (HumaLOG) corrective regimen sliding scale   SubCutaneous Before meals and at bedtime  insulin lispro Injectable (HumaLOG) 7 Unit(s) SubCutaneous every 6 hours  micafungin IVPB 100 milliGRAM(s) IV Intermittent every 24 hours  midodrine 15 milliGRAM(s) Oral every 8 hours  modafinil 100 milliGRAM(s) Oral <User Schedule>  multivitamin 1 Tablet(s) Oral daily  norepinephrine Infusion 0.01 MICROgram(s)/kG/Min (1.5 mL/Hr) IV Continuous <Continuous>  pantoprazole   Suspension 40 milliGRAM(s) Oral daily  sodium chloride 0.9% lock flush 20 milliLiter(s) IV Push once  thiamine 100 milliGRAM(s) Oral daily    MEDICATIONS  (PRN):  acetaminophen    Suspension. 650 milliGRAM(s) Oral every 6 hours PRN Moderate Pain (4 - 6)  dextrose Gel 1 Dose(s) Oral once PRN Blood Glucose LESS THAN 70 milliGRAM(s)/deciliter  glucagon  Injectable 1 milliGRAM(s) IntraMuscular once PRN Glucose LESS THAN 70 milligrams/deciliter  sodium chloride 0.9% lock flush 10 milliLiter(s) IV Push every 1 hour PRN After each medication administration  sodium chloride 0.9% lock flush 10 milliLiter(s) IV Push every 12 hours PRN Lumen of catheter NOT used      ALLERGIES:  Allergies    No Known Allergies    Intolerances      LABS:                        9.8    4.0   )-----------( 56       ( 09 Apr 2018 06:24 )             32.4     04-08    139  |  98  |  54<H>  ----------------------------<  265<H>  5.3   |  28  |  2.99<H>    Ca    9.1      08 Apr 2018 05:44  Mg     2.2     04-09      RADIOLOGY & ADDITIONAL TESTS: Reviewed.    step down level of care on 7lachman.

## 2018-04-09 NOTE — PROGRESS NOTE ADULT - ATTENDING COMMENTS
Patient seen and examined with house-staff during bedside rounds.  Resident note read, including vitals, physical findings, laboratory data, and radiological reports.   Revisions included below.  Direct personal management at bed side and extensive interpretation of the data.  Plan was outlined and discussed in details with the housestaff.  Decision making of high complexity  Action taken for acute disease activity to reflect the level of care provided:  - medication reconciliation  - review laboratory data  - management of respiratory failure  - Management of ventilator  - Management of sepsis and fungaemia  - Insert a nazanin  - continue cardiac meds

## 2018-04-09 NOTE — PROGRESS NOTE ADULT - ASSESSMENT
63M PMH HFrEF 10-15% (ischemic), MI, s/p AICD vs PPM, possible Afib, HTN, DM2 on insulin, possible CKD, and gout, who presented with a chief complaint of generalized weakness s/p septic shock likely 2/2 LE cellulitis. Now with AMS likely from brainstem infarct and/or toxic metabolic encephalopathy and + SIRS but no infection source.     NEURO  #AMS  - Pt non responsive since 3/22. Vital signs normal during the event. Pt intubated for airway protection. Stroke code called. CT, CTA negative. VEEG showed moderate slowing but no seizure activity.   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 Per neuro, event could have resulted from hypoperfusion.   - C/w Modafinil.   - Unclear neuro prognosis at this point. Pt cannot protect airway.   - TTE with Definity shows no clot at present  - S/p PEG/trach on 4/4       ID  #+SIRS- Bandemia  and low grade fever. CXR with worsened b/l opacifications. Bedside US with worsened simple b/l pleural effusions and atelectic lung. Less concern for infectious source given b/l effusions with no septations/loculations. Likely related to fluid overload. If clinically worsening, can consider thoracentesis for fluid studies and cultures.   - Repeat blood cultures positive for candida tropicalis  - ID consulted, appreciate recommendations  - Micafungin 100mg q 24hrs  - Tunelled HD catheter removed 4/8  - F/u repeat cultures  - Repeat RUQ US to r/o cholecystitis given elevated bili; however negative except for small amount of pericholecystic fluid.       #Septic shock 2/2 Cellulitis- Resolved  - C/w midodrine to 15 q8   - Decreased Solucortef 25mg q 24hrs given hyperglycemia/ infection  - S/p zosyn for suspected UTI(3/25-3/26)  s/p Zosyn (3/11-2/21). Was previously also on Vanco (3/11-3/17).   - RUQ doppler shows no portal vein thrombosis. given worsening transaminitis and increased residuals RUQ sono was obtained which only shows cholelithiasis.   - TTE with no vegetations. Pt not stable for RUKHSANA.   - Gallium scan showed LLE cellulitis.     - repeat LE CT does not show abscess or gas  - HIV negative  - Vascular surgery on board. Recommending Amputation as an option if pt continues to be unstable.     #UTI- Urine cloudy appearing with increase WBC.- Resolved  - S/p zosyn 3/25-3/26    CARDIOVASCULAR   # HYPOTENSION-   - c/w Midodrine 15 mg q8h to maintain SBP>65  - Solumedrol decreased as above   - Levophed titrated off, will restart if needed during HD to maintain MAP >65  - CHF with severely reduced EF 15%, caution with fluids. Per renal recs, can give IV albumin for fluids.    # CHF exacerbation- CHF with EF 10-15% per pt 2/2 ischemic cardiomyopathy s/p AICD. Elevated BNP on admission with R sided effusion and edema  - F/u renal recs  - persistent pulmonary vascular congestion on CXR with layering pleural effusions.   - Maintain negative fluid balance with dialysis, watching BP  - Unclear medical regimen opt. Per [facility] pharmacy ( and [facility]) pt has not picked up Torsemide 20 or Metoprolol 100 since November.   - Hold ACE/Metoprolol in setting of sepsis/ hypotension    #AFIB- hx of Afib and LV thrombus on coumadin.    - Holding Metoprolol 12.5 q12h given hypotension. Will use Dig for rate control if RVR   - Holding coumadin given thrombocytopenia, will consider starting when plts stable/ uptrending  - S/p heparin gtt, discontinued 2/2 thrombocytopenia concern for HIT.  - TTE with affinity shows no  LV thrombus currently    #CAD- Hx of MI s/p AICD  - Pt with pLAD in 7/2017, was started on Aspirin and Brilinta per records.  - Holding asp 81, coumadin and atorvastatin (discontinued 2/2 LFT uptrend, thrombocytopenia).     #LE Edema   - LE edema with chronic venous stasis.   - Duplex does not show DVT    PULMONARY   #Acute Resp failure- S/p tracheostomy 4/5. Back on vent overnight with CPAP trial this morning. CXR today with worsened opacities on L and R. Bedside US with worsened simple b/l pleural effusions and atelectic lung. Less concern for infectious source given b/l effusions with no septations/loculations. Likely related to fluid overload. If clinically worsening, can consider thoracentesis for fluid studies and cultures.  -Continue CPAP trials, weaning to trach collar as tolerated.   - continue dialysis for fluid removal once HD cath replaced, will need negative cultures for 24 hrs, no dialysis today      RENAL   #ARF- likely ischemic ATN in setting of sepsis with low intravascular volume. Unknown baseline Cr presenting with Cr 3.93 with metabolic derangements. Renal team on board, pt with R HD catheter placed by IR on 3/21. Now on intermittent HD. HD catheter removed 4/8 due to fungemia.   - c/w intermittent HD per renal recs once HD catheter replaced  - CT abdomen and pelvis without acute obstruction.   - retroperitoneal ultrasound showing medical renal disease.     #Hyperkalemia- PT with elevated K to 5.7 on admission,  no EKG changes. Currently resolved  - Continue telemetry monitoring  - Trend K   - c/w dialysis     #Metabolic acidosis   - 2/2 Lactate, starvation ketoacidosis and uremia   - resolved       GI   # transaminitis and elevated bili- Patient with transaminitis that has remained stable, however with new elevation in Bili to 2.1 today. Prior RUQ US with cholelithiasis and prior CT abdomen/pelvis consistent with cholelithiasis and ascites.   - Repeat RUQ US negative fo cholecysitis, rec HIDA if strong clinical suspicion. However patient is now found to be fungemic.     HEME  #Thrombocytopenia- ELVIRA negative but PF4 was positive. Unlikely HIT. Most likely 2/2 zosyn or sepsis   - Continue to hold coumadin and Aspirin  - Monitor for signs of bleeding   - transfuse if <10 or <50 bleeding    #Elevated INR. Unclear etiology.   - Holding coumadin given thrombocytopenia  - Given Vit K and FFP3/12. FFP 3/13, FFP 4/4  - Monitor coags    #Anemia- Likely 2/2 phlebotomy and CKD, no signs of bleeding. Prior studies showed Anemia of chronic disease.  -Continue to monitor  -s/p 1pRBC on 3/12, 3/16 and 4/4    ENDOCRINE   - Lantus 20U and start humalog 7units TID with meals  - MISS  - monitor FSG and adjust as needed   - A1C 8.6%      F: No IVF   E: Replete K<4 Mg<2, caution in setting of acute renal failure  N: PEG Placed 4/4, 1.5 Glucerna      Lines:  ETT (3/22), OG tube (3/21)R HD catheter (3/21).,   Drips: None  Full code  Dispo: MICU  Ppx: PPI, no heparin/ coumadin given thrombocytopenia

## 2018-04-10 LAB
ALBUMIN SERPL ELPH-MCNC: 3.2 G/DL — LOW (ref 3.3–5)
ALP SERPL-CCNC: 214 U/L — HIGH (ref 40–120)
ALT FLD-CCNC: 55 U/L — HIGH (ref 10–45)
ANION GAP SERPL CALC-SCNC: 16 MMOL/L — SIGNIFICANT CHANGE UP (ref 5–17)
ANION GAP SERPL CALC-SCNC: 18 MMOL/L — HIGH (ref 5–17)
ANION GAP SERPL CALC-SCNC: 19 MMOL/L — HIGH (ref 5–17)
AST SERPL-CCNC: 47 U/L — HIGH (ref 10–40)
BASOPHILS NFR BLD AUTO: 0 % — SIGNIFICANT CHANGE UP (ref 0–2)
BILIRUB SERPL-MCNC: 1.1 MG/DL — SIGNIFICANT CHANGE UP (ref 0.2–1.2)
BUN SERPL-MCNC: 87 MG/DL — HIGH (ref 7–23)
BUN SERPL-MCNC: 89 MG/DL — HIGH (ref 7–23)
BUN SERPL-MCNC: 94 MG/DL — HIGH (ref 7–23)
CALCIUM SERPL-MCNC: 9.1 MG/DL — SIGNIFICANT CHANGE UP (ref 8.4–10.5)
CALCIUM SERPL-MCNC: 9.5 MG/DL — SIGNIFICANT CHANGE UP (ref 8.4–10.5)
CALCIUM SERPL-MCNC: 9.6 MG/DL — SIGNIFICANT CHANGE UP (ref 8.4–10.5)
CHLORIDE SERPL-SCNC: 100 MMOL/L — SIGNIFICANT CHANGE UP (ref 96–108)
CHLORIDE SERPL-SCNC: 98 MMOL/L — SIGNIFICANT CHANGE UP (ref 96–108)
CHLORIDE SERPL-SCNC: 99 MMOL/L — SIGNIFICANT CHANGE UP (ref 96–108)
CO2 SERPL-SCNC: 24 MMOL/L — SIGNIFICANT CHANGE UP (ref 22–31)
CO2 SERPL-SCNC: 24 MMOL/L — SIGNIFICANT CHANGE UP (ref 22–31)
CO2 SERPL-SCNC: 26 MMOL/L — SIGNIFICANT CHANGE UP (ref 22–31)
CREAT SERPL-MCNC: 4.19 MG/DL — HIGH (ref 0.5–1.3)
CREAT SERPL-MCNC: 4.19 MG/DL — HIGH (ref 0.5–1.3)
CREAT SERPL-MCNC: 4.44 MG/DL — HIGH (ref 0.5–1.3)
CULTURE RESULTS: NO GROWTH — SIGNIFICANT CHANGE UP
CULTURE RESULTS: SIGNIFICANT CHANGE UP
EOSINOPHIL NFR BLD AUTO: 2 % — SIGNIFICANT CHANGE UP (ref 0–6)
GLUCOSE BLDC GLUCOMTR-MCNC: 111 MG/DL — HIGH (ref 70–99)
GLUCOSE BLDC GLUCOMTR-MCNC: 131 MG/DL — HIGH (ref 70–99)
GLUCOSE BLDC GLUCOMTR-MCNC: 211 MG/DL — HIGH (ref 70–99)
GLUCOSE SERPL-MCNC: 125 MG/DL — HIGH (ref 70–99)
GLUCOSE SERPL-MCNC: 190 MG/DL — HIGH (ref 70–99)
GLUCOSE SERPL-MCNC: 228 MG/DL — HIGH (ref 70–99)
HCT VFR BLD CALC: 28.6 % — LOW (ref 39–50)
HGB BLD-MCNC: 8.7 G/DL — LOW (ref 13–17)
LYMPHOCYTES # BLD AUTO: 7 % — LOW (ref 13–44)
MAGNESIUM SERPL-MCNC: 2.1 MG/DL — SIGNIFICANT CHANGE UP (ref 1.6–2.6)
MCHC RBC-ENTMCNC: 30.4 G/DL — LOW (ref 32–36)
MCHC RBC-ENTMCNC: 30.7 PG — SIGNIFICANT CHANGE UP (ref 27–34)
MCV RBC AUTO: 101.1 FL — HIGH (ref 80–100)
MONOCYTES NFR BLD AUTO: 4 % — SIGNIFICANT CHANGE UP (ref 2–14)
NEUTROPHILS NFR BLD AUTO: 47 % — SIGNIFICANT CHANGE UP (ref 43–77)
PHOSPHATE SERPL-MCNC: 0.9 MG/DL — CRITICAL LOW (ref 2.5–4.5)
PLATELET # BLD AUTO: 61 K/UL — LOW (ref 150–400)
POTASSIUM SERPL-MCNC: 5.3 MMOL/L — SIGNIFICANT CHANGE UP (ref 3.5–5.3)
POTASSIUM SERPL-MCNC: 5.6 MMOL/L — HIGH (ref 3.5–5.3)
POTASSIUM SERPL-MCNC: 5.8 MMOL/L — HIGH (ref 3.5–5.3)
POTASSIUM SERPL-SCNC: 5.3 MMOL/L — SIGNIFICANT CHANGE UP (ref 3.5–5.3)
POTASSIUM SERPL-SCNC: 5.6 MMOL/L — HIGH (ref 3.5–5.3)
POTASSIUM SERPL-SCNC: 5.8 MMOL/L — HIGH (ref 3.5–5.3)
PROT SERPL-MCNC: 7 G/DL — SIGNIFICANT CHANGE UP (ref 6–8.3)
RBC # BLD: 2.83 M/UL — LOW (ref 4.2–5.8)
RBC # FLD: 17.5 % — HIGH (ref 10.3–16.9)
SODIUM SERPL-SCNC: 140 MMOL/L — SIGNIFICANT CHANGE UP (ref 135–145)
SODIUM SERPL-SCNC: 142 MMOL/L — SIGNIFICANT CHANGE UP (ref 135–145)
SODIUM SERPL-SCNC: 142 MMOL/L — SIGNIFICANT CHANGE UP (ref 135–145)
SPECIMEN SOURCE: SIGNIFICANT CHANGE UP
SPECIMEN SOURCE: SIGNIFICANT CHANGE UP
WBC # BLD: 5.5 K/UL — SIGNIFICANT CHANGE UP (ref 3.8–10.5)
WBC # FLD AUTO: 5.5 K/UL — SIGNIFICANT CHANGE UP (ref 3.8–10.5)

## 2018-04-10 PROCEDURE — 71045 X-RAY EXAM CHEST 1 VIEW: CPT | Mod: 26

## 2018-04-10 PROCEDURE — 99233 SBSQ HOSP IP/OBS HIGH 50: CPT

## 2018-04-10 PROCEDURE — 93312 ECHO TRANSESOPHAGEAL: CPT | Mod: 26

## 2018-04-10 PROCEDURE — 70450 CT HEAD/BRAIN W/O DYE: CPT | Mod: 26

## 2018-04-10 PROCEDURE — 93325 DOPPLER ECHO COLOR FLOW MAPG: CPT | Mod: 26,59

## 2018-04-10 PROCEDURE — 93306 TTE W/DOPPLER COMPLETE: CPT | Mod: 26,59

## 2018-04-10 PROCEDURE — 99291 CRITICAL CARE FIRST HOUR: CPT

## 2018-04-10 PROCEDURE — 93321 DOPPLER ECHO F-UP/LMTD STD: CPT | Mod: 26,59

## 2018-04-10 PROCEDURE — 99233 SBSQ HOSP IP/OBS HIGH 50: CPT | Mod: GC

## 2018-04-10 RX ORDER — MIDAZOLAM HYDROCHLORIDE 1 MG/ML
0.5 INJECTION, SOLUTION INTRAMUSCULAR; INTRAVENOUS ONCE
Qty: 0 | Refills: 0 | Status: DISCONTINUED | OUTPATIENT
Start: 2018-04-10 | End: 2018-04-10

## 2018-04-10 RX ORDER — FENTANYL CITRATE 50 UG/ML
25 INJECTION INTRAVENOUS ONCE
Qty: 0 | Refills: 0 | Status: DISCONTINUED | OUTPATIENT
Start: 2018-04-10 | End: 2018-04-10

## 2018-04-10 RX ORDER — DIATRIZOATE MEGLUMINE 180 MG/ML
30 INJECTION, SOLUTION INTRAVESICAL ONCE
Qty: 0 | Refills: 0 | Status: COMPLETED | OUTPATIENT
Start: 2018-04-10 | End: 2018-04-10

## 2018-04-10 RX ORDER — SODIUM POLYSTYRENE SULFONATE 4.1 MEQ/G
30 POWDER, FOR SUSPENSION ORAL ONCE
Qty: 0 | Refills: 0 | Status: COMPLETED | OUTPATIENT
Start: 2018-04-10 | End: 2018-04-10

## 2018-04-10 RX ORDER — FENTANYL CITRATE 50 UG/ML
50 INJECTION INTRAVENOUS ONCE
Qty: 0 | Refills: 0 | Status: DISCONTINUED | OUTPATIENT
Start: 2018-04-10 | End: 2018-04-10

## 2018-04-10 RX ORDER — MIDAZOLAM HYDROCHLORIDE 1 MG/ML
4 INJECTION, SOLUTION INTRAMUSCULAR; INTRAVENOUS ONCE
Qty: 0 | Refills: 0 | Status: DISCONTINUED | OUTPATIENT
Start: 2018-04-10 | End: 2018-04-10

## 2018-04-10 RX ORDER — ALBUMIN HUMAN 25 %
50 VIAL (ML) INTRAVENOUS
Qty: 0 | Refills: 0 | Status: COMPLETED | OUTPATIENT
Start: 2018-04-10 | End: 2018-04-10

## 2018-04-10 RX ORDER — MODAFINIL 200 MG/1
100 TABLET ORAL
Qty: 0 | Refills: 0 | Status: DISCONTINUED | OUTPATIENT
Start: 2018-04-10 | End: 2018-04-15

## 2018-04-10 RX ORDER — INSULIN GLARGINE 100 [IU]/ML
6 INJECTION, SOLUTION SUBCUTANEOUS AT BEDTIME
Qty: 0 | Refills: 0 | Status: DISCONTINUED | OUTPATIENT
Start: 2018-04-10 | End: 2018-04-11

## 2018-04-10 RX ADMIN — SODIUM POLYSTYRENE SULFONATE 30 GRAM(S): 4.1 POWDER, FOR SUSPENSION ORAL at 02:18

## 2018-04-10 RX ADMIN — MIDODRINE HYDROCHLORIDE 15 MILLIGRAM(S): 2.5 TABLET ORAL at 11:42

## 2018-04-10 RX ADMIN — ESCITALOPRAM OXALATE 5 MILLIGRAM(S): 10 TABLET, FILM COATED ORAL at 11:42

## 2018-04-10 RX ADMIN — CHLORHEXIDINE GLUCONATE 15 MILLILITER(S): 213 SOLUTION TOPICAL at 11:42

## 2018-04-10 RX ADMIN — ERGOCALCIFEROL 6000 UNIT(S): 1.25 CAPSULE ORAL at 11:43

## 2018-04-10 RX ADMIN — Medication 650 MILLIGRAM(S): at 02:18

## 2018-04-10 RX ADMIN — FENTANYL CITRATE 25 MICROGRAM(S): 50 INJECTION INTRAVENOUS at 05:19

## 2018-04-10 RX ADMIN — INSULIN HUMAN 4: 100 INJECTION, SOLUTION SUBCUTANEOUS at 07:01

## 2018-04-10 RX ADMIN — Medication 650 MILLIGRAM(S): at 13:00

## 2018-04-10 RX ADMIN — Medication 85 MILLIMOLE(S): at 11:43

## 2018-04-10 RX ADMIN — MODAFINIL 100 MILLIGRAM(S): 200 TABLET ORAL at 07:00

## 2018-04-10 RX ADMIN — Medication 50 MILLILITER(S): at 19:10

## 2018-04-10 RX ADMIN — MIDAZOLAM HYDROCHLORIDE 0.5 MILLIGRAM(S): 1 INJECTION, SOLUTION INTRAMUSCULAR; INTRAVENOUS at 10:55

## 2018-04-10 RX ADMIN — Medication 100 MILLIGRAM(S): at 11:43

## 2018-04-10 RX ADMIN — DIATRIZOATE MEGLUMINE 30 MILLILITER(S): 180 INJECTION, SOLUTION INTRAVESICAL at 23:32

## 2018-04-10 RX ADMIN — Medication 50 MILLILITER(S): at 20:10

## 2018-04-10 RX ADMIN — MIDODRINE HYDROCHLORIDE 15 MILLIGRAM(S): 2.5 TABLET ORAL at 19:16

## 2018-04-10 RX ADMIN — INSULIN GLARGINE 6 UNIT(S): 100 INJECTION, SOLUTION SUBCUTANEOUS at 21:52

## 2018-04-10 RX ADMIN — Medication 1 TABLET(S): at 11:42

## 2018-04-10 RX ADMIN — MODAFINIL 100 MILLIGRAM(S): 200 TABLET ORAL at 12:23

## 2018-04-10 RX ADMIN — MIDODRINE HYDROCHLORIDE 15 MILLIGRAM(S): 2.5 TABLET ORAL at 02:18

## 2018-04-10 RX ADMIN — Medication 1 MILLIGRAM(S): at 11:42

## 2018-04-10 RX ADMIN — Medication 650 MILLIGRAM(S): at 12:23

## 2018-04-10 RX ADMIN — CHLORHEXIDINE GLUCONATE 15 MILLILITER(S): 213 SOLUTION TOPICAL at 21:52

## 2018-04-10 RX ADMIN — Medication 50 MILLILITER(S): at 18:10

## 2018-04-10 RX ADMIN — PANTOPRAZOLE SODIUM 40 MILLIGRAM(S): 20 TABLET, DELAYED RELEASE ORAL at 11:42

## 2018-04-10 RX ADMIN — Medication 650 MILLIGRAM(S): at 03:45

## 2018-04-10 RX ADMIN — Medication 25 MILLIGRAM(S): at 03:45

## 2018-04-10 NOTE — PROCEDURE NOTE - NSSITEPREP_SKIN_A_CORE
chlorhexidine
chlorhexidine/Adherence to aseptic technique: hand hygiene prior to donning barriers (gown, gloves), don cap and mask, sterile drape over patient
chlorhexidine

## 2018-04-10 NOTE — PROGRESS NOTE ADULT - SUBJECTIVE AND OBJECTIVE BOX
INTERVAL HPI/OVERNIGHT EVENTS: Febrile to 101.9, given tylenol. Later was tachy to 130s, sinus tachycardia. Ophtho evaluated patient, no endophthalmitis. Repeat K 5.6, given additional 30 of kayexalate. Rectal tube placed for diarrhea.     SUBJECTIVE: Patient seen and examined at bedside. Patient somnolent, unable to arouse. Patient resisting manual attempts to open eyes, but not following commands. ROS not attainable.     OBJECTIVE:    VITAL SIGNS:  ICU Vital Signs Last 24 Hrs  T(C): 37.9 (10 Apr 2018 10:00), Max: 38.8 (10 Apr 2018 01:32)  T(F): 100.3 (10 Apr 2018 10:00), Max: 101.9 (10 Apr 2018 01:32)  HR: 120 (10 Apr 2018 14:30) (94 - 132)  BP: 92/73 (10 Apr 2018 14:30) (81/66 - 103/80)  BP(mean): 78 (10 Apr 2018 14:30) (68 - 91)  ABP: --  ABP(mean): --  RR: 20 (10 Apr 2018 14:30) (11 - 43)  SpO2: 100% (10 Apr 2018 14:30) (97% - 100%)    Mode: AC/ CMV (Assist Control/ Continuous Mandatory Ventilation), RR (machine): 10, TV (machine): 500, FiO2: 40, PEEP: 5, ITime: 0.9, MAP: 8.4, PIP: 20    04-09 @ 07:01  -  04-10 @ 07:00  --------------------------------------------------------  IN: 1796 mL / OUT: 15 mL / NET: 1781 mL    04-10 @ 07:01  -  04-10 @ 14:34  --------------------------------------------------------  IN: 0 mL / OUT: 50 mL / NET: -50 mL      CAPILLARY BLOOD GLUCOSE  POCT Blood Glucose.: 111 mg/dL (10 Apr 2018 11:42)      PHYSICAL EXAM:    General: Uncomfortable appearing, NAD.   HEENT: NC/AT; unable to manually open eyes  Neck: supple, + trach in place, oozing serosanguinous fluid. + Ulcer above trach site   Respiratory: +Rhonchi b/l, no wheezing. Non-labored breathing on CPAP.   Cardiovascular: +S1/S2; RRR, no m/r/g  Abdomen: soft, NT/ND; +BS   Extremities: WWP, 2+ peripheral pulses b/l; 2+ dependent edema.   Skin: normal color and turgor; no rash  Neurological: Not responding or following commands. Facial strength intact (unable to manually open eyes). Extremities rigid. Spontaneously moves RUE but not LUE or b/l LE. Not withdrawing to pain.     MEDICATIONS:  MEDICATIONS  (STANDING):  albumin human 25% IVPB 50 milliLiter(s) IV Intermittent every 1 hour  chlorhexidine 0.12% Liquid 15 milliLiter(s) Swish and Spit two times a day  chlorhexidine 2% Cloths 1 Application(s) Topical daily  dextrose 5%. 1000 milliLiter(s) (50 mL/Hr) IV Continuous <Continuous>  dextrose 50% Injectable 12.5 Gram(s) IV Push once  dextrose 50% Injectable 25 Gram(s) IV Push once  dextrose 50% Injectable 25 Gram(s) IV Push once  ergocalciferol Drops 6000 Unit(s) Oral daily  escitalopram 5 milliGRAM(s) Oral daily  folic acid 1 milliGRAM(s) Oral daily  hydrocortisone sodium succinate Injectable 25 milliGRAM(s) IV Push every 24 hours  insulin glargine Injectable (LANTUS) 12 Unit(s) SubCutaneous at bedtime  insulin regular  human corrective regimen sliding scale   SubCutaneous every 6 hours  insulin regular  human recombinant 7 Unit(s) SubCutaneous every 6 hours  micafungin IVPB 100 milliGRAM(s) IV Intermittent every 24 hours  midodrine 15 milliGRAM(s) Oral every 8 hours  modafinil 100 milliGRAM(s) Oral <User Schedule>  multivitamin 1 Tablet(s) Oral daily  pantoprazole   Suspension 40 milliGRAM(s) Oral daily  thiamine 100 milliGRAM(s) Oral daily    MEDICATIONS  (PRN):  acetaminophen    Suspension. 650 milliGRAM(s) Oral every 6 hours PRN Moderate Pain (4 - 6)  dextrose Gel 1 Dose(s) Oral once PRN Blood Glucose LESS THAN 70 milliGRAM(s)/deciliter  glucagon  Injectable 1 milliGRAM(s) IntraMuscular once PRN Glucose LESS THAN 70 milligrams/deciliter      ALLERGIES:  Allergies    No Known Allergies    Intolerances        LABS:                        8.7    5.5   )-----------( 61       ( 10 Apr 2018 06:48 )             28.6     04-10    142  |  99  |  94<H>  ----------------------------<  125<H>  5.3   |  24  |  4.44<H>    Ca    9.6      10 Apr 2018 11:28  Phos  1.1     04-10  Mg     2.1     04-10    TPro  7.0  /  Alb  3.2<L>  /  TBili  1.1  /  DBili  x   /  AST  47<H>  /  ALT  55<H>  /  AlkPhos  214<H>  04-10      RADIOLOGY & ADDITIONAL TESTS:    < from: CT Head No Cont (04.10.18 @ 13:32) >    Findings: Comparison is made to a prior CT performed on 3/22/2018.    There is no evidence of acute intracranial hemorrhage. There are   bilateral occipital chronic infarctions, unchanged. There is a tiny left   posterior temporal chronic infarction. There is a tiny left frontal   chronic infarction, axial image #24 ). There is a tiny left external   capsule chronic lacunar infarct.    There are mild patchy areas of low density within the periventricular   white matter as well as within the je suspicious for mild microvascular   disease. There is enlargement the sulci, cisterns and ventricles   consistent with mild cerebral volume loss however there is superimposed   prominence of the ventricles, unchanged. There is no evidence of   mass-effect or midline shift. There is no evidence of an intra or   extra-axial fluid collection.     There are small polyps versus retention cysts within the maxillary   sinuses. The remaining sinuses and bilateral mastoid air cells are clear.    Impression: Bilateral occipital chronic infarcts. Tiny left posterior   temporal chronic infarction. Tiny left frontal chronic infarct. Tiny left   chronic external capsule infarction. Prominence of the ventricles,   unchanged. Mild microvascular disease. No acute intracranial injury.    < end of copied text >    < from: MR Head No Cont (03.26.18 @ 15:16) >    EXAM:  MR BRAIN                          *** ADDENDUM 04/10/2018  ***    Rereview of the images demonstrates at the C3/C4 level there is a an   approximately 1.5 cm long extrusion coursing superiorly to the C2/C3   contacting the ventral spinal cord (sagittal image #12 series 5).      *** END OF ADDENDUM 04/10/2018  ***      PROCEDURE DATE:  03/26/2018           < end of copied text >

## 2018-04-10 NOTE — PROGRESS NOTE ADULT - ASSESSMENT
63M PMH HFrEF 10-15% (ischemic), MI, s/p AICD vs PPM, possible Afib, HTN, DM2 on insulin, possible CKD, and gout, who presented with a chief complaint of generalized weakness s/p septic shock likely 2/2 LE cellulitis. Now with AMS likely from brainstem infarct and/or toxic metabolic encephalopathy, now with candidemia.    NEURO  #AMS  - Pt non responsive since 3/22. Vital signs normal during the event. Pt intubated for airway protection. Stroke code called. CT, CTA negative. VEEG showed moderate slowing but no seizure activity.  -Patient noted to not be moving L arm today, unclear when this first started-  Repeat CT head today showing only chronic infarctions.   - MRI (from 3/26) read updated today showing  C3/C4 level there is a an approximately 1.5 cm long extrusion coursing superiorly to the C2/C3 contacting the ventral spinal cord   - MRI shows several old post circulation infarcts and possible new area of brainstem infarct. Per neuro, event could have resulted from hypoperfusion.   - C/w Modafinil.   - Unclear neuro prognosis at this point.   - TTE with Definity shows no clot at present  - S/p PEG/trach on 4/4       ID  #Candidemia with candida tropicalis: +SIRS- Bandemia  and fever. Blood cultures positive for candida tropicalis on 4/7.   -Surveillance cx 4/9 again w/ candida  - Micafungin 100mg q 24hrs  -Tunelled HD catheter removed 4/8  - ID consulted, appreciate recommendations  - F/u repeat fungal cultures    #Septic shock 2/2 Cellulitis- Resolved  - C/w midodrine to 15 q8   - Decreased Solucortef 25mg q 24hrs given hyperglycemia/ infection, patient on for hypotension borderline ACTH test  - S/p zosyn for suspected UTI(3/25-3/26)  s/p Zosyn (3/11-2/21). Was previously also on Vanco (3/11-3/17).   - RUQ doppler shows no portal vein thrombosis. given worsening transaminitis and increased residuals RUQ sono was obtained which only shows cholelithiasis.   - TTE with no vegetations. Pt not stable for RUKHSANA.   - Gallium scan showed LLE cellulitis.     - repeat LE CT does not show abscess or gas  - HIV negative  - Vascular surgery on board. Recommending Amputation as an option if pt continues to be unstable.     #UTI- Urine cloudy appearing with increase WBC.- Resolved  - S/p zosyn 3/25-3/26    CARDIOVASCULAR   # HYPOTENSION-   - c/w Midodrine 15 mg q8h to maintain SBP>65  - Solumedrol decreased as above   - Levophed titrated off, will restart if needed during HD to maintain MAP >65  - CHF with severely reduced EF 15%, caution with fluids. Per renal recs, can give IV albumin for fluids.    # CHF exacerbation- CHF with EF 10-15% per pt 2/2 ischemic cardiomyopathy s/p AICD. Elevated BNP on admission with R sided effusion and edema  - persistent pulmonary vascular congestion on CXR with layering pleural effusions.   - Maintain negative fluid balance with dialysis, watching BP  - Unclear medical regimen opt. Per [facility] pharmacy ( and [place]) pt has not picked up Torsemide 20 or Metoprolol 100 since November.   - Hold ACE/Metoprolol in setting of sepsis/ hypotension    #AFIB- hx of Afib and LV thrombus on coumadin.    - Holding Metoprolol 12.5 q12h given hypotension. Will use Dig for rate control if RVR   - Holding coumadin given thrombocytopenia, will consider starting when plts stable/ uptrending  - S/p heparin gtt, discontinued 2/2 thrombocytopenia concern for HIT.  - TTE with affinity shows no  LV thrombus currently    #CAD- Hx of MI s/p AICD  - Pt with pLAD in 7/2017, was started on Aspirin and Brilinta per records.  - Holding asp 81, coumadin and atorvastatin (discontinued 2/2 LFT uptrend, thrombocytopenia).     #LE Edema   - LE edema with chronic venous stasis.   - Duplex does not show DVT    PULMONARY   #Acute Resp failure- S/p tracheostomy 4/5. Back on vent overnight with CPAP trial this morning. CXR today with worsened opacities on L and R. Bedside US with worsened simple b/l pleural effusions and atelectic lung. Less concern for infectious source given b/l effusions with no septations/loculations. Likely related to fluid overload. If clinically worsening, can consider thoracentesis for fluid studies and cultures.  -Continue CPAP trials, weaning to trach collar as tolerated.       RENAL   #ARF- likely ischemic ATN in setting of sepsis with low intravascular volume. Unknown baseline Cr presenting with Cr 3.93 with metabolic derangements. Renal team on board, pt with R HD catheter placed by IR on 3/21. Now on intermittent HD. HD catheter removed 4/8 due to fungemia.   -Place temporary HD catheter for dialysis today  - c/w intermittent HD per renal recs   - CT abdomen and pelvis without acute obstruction.   - retroperitoneal ultrasound showing medical renal disease.     #Hyperkalemia- PT with elevated K to 5.7 on admission,  no EKG changes. K again uptrended in setting of not getting dialysis.   -S/p kayexalate 30mg x2 yesterday.   - Continue telemetry monitoring  - Trend K   - c/w dialysis     #Metabolic acidosis   - 2/2 Lactate, starvation ketoacidosis and uremia   - resolved       GI   # transaminitis and elevated bili- Patient with transaminitis that has remained stable, however with new elevation in Bili to 2.1 today. Prior RUQ US with cholelithiasis and prior CT abdomen/pelvis consistent with cholelithiasis and ascites.   - Repeat RUQ US negative fo cholecysitis, rec HIDA if strong clinical suspicion. However patient is now found to be fungemic.     HEME  #Thrombocytopenia- ELVIRA negative but PF4 was positive. Unlikely HIT. Most likely 2/2 zosyn or sepsis   - Continue to hold coumadin and Aspirin  - Monitor for signs of bleeding   - transfuse if <10 or <50 bleeding    #Elevated INR. Unclear etiology.   - Holding coumadin given thrombocytopenia  - Given Vit K and FFP3/12. FFP 3/13, FFP 4/4  - Monitor coags    #Anemia- Likely 2/2 phlebotomy and CKD, no signs of bleeding. Prior studies showed Anemia of chronic disease.  -Continue to monitor  -s/p 1pRBC on 3/12, 3/16 and 4/4    ENDOCRINE   - Lantus 12U and start humalin 7units TID   - MISS  - monitor FSG and adjust as needed   - A1C 8.6%      F: No IVF   E: Replete K<4 Mg<2, caution in setting of acute renal failure  N: PEG Placed 4/4, 1.5 Glucerna      Lines:  Peripheral   Drips: None  Full code  Dispo: MICU  Ppx: PPI, no heparin/ coumadin given thrombocytopenia 63M PMH HFrEF 10-15% (ischemic), MI, s/p AICD vs PPM, possible Afib, HTN, DM2 on insulin, possible CKD, and gout, who presented with a chief complaint of generalized weakness s/p septic shock likely 2/2 LE cellulitis. Now with AMS likely from brainstem infarct and/or toxic metabolic encephalopathy, now with candidemia.    NEURO  #AMS  - Pt non responsive since 3/22. Vital signs normal during the event. Pt intubated for airway protection. Stroke code called. CT, CTA negative. VEEG showed moderate slowing but no seizure activity.  -Patient noted to not be moving L arm today, unclear when this first started-  Repeat CT head today showing only chronic infarctions.   - MRI (from 3/26) read updated today showing  C3/C4 level there is a an approximately 1.5 cm long extrusion coursing superiorly to the C2/C3 contacting the ventral spinal cord   - MRI shows several old post circulation infarcts and possible new area of brainstem infarct. Per neuro, event could have resulted from hypoperfusion.   - C/w Modafinil.   - Unclear neuro prognosis at this point.   - TTE with Definity shows no clot at present  - S/p PEG/trach on 4/4       ID  #Candidemia with candida tropicalis: +SIRS- Bandemia  and fever. Blood cultures positive for candida tropicalis on 4/7.   - Surveillance cx 4/9 again w/ candida  - Micafungin 100mg q 24hrs  -RUKHSANA negative for vegetations/ infection of AICD  -Tunelled HD catheter removed 4/8  - ID consulted, appreciate recommendations  - F/u repeat fungal cultures    #Septic shock 2/2 Cellulitis- Resolved  - C/w midodrine to 15 q8   - Decreased Solucortef 25mg q 24hrs given hyperglycemia/ infection, patient on for hypotension borderline ACTH test  - S/p zosyn for suspected UTI(3/25-3/26)  s/p Zosyn (3/11-2/21). Was previously also on Vanco (3/11-3/17).   - RUQ doppler shows no portal vein thrombosis. given worsening transaminitis and increased residuals RUQ sono was obtained which only shows cholelithiasis.   - TTE with no vegetations. Pt not stable for RUKHSANA.   - Gallium scan showed LLE cellulitis.     - repeat LE CT does not show abscess or gas  - HIV negative  - Vascular surgery on board. Recommending Amputation as an option if pt continues to be unstable.     #UTI- Urine cloudy appearing with increase WBC.- Resolved  - S/p zosyn 3/25-3/26    CARDIOVASCULAR   # HYPOTENSION-   - c/w Midodrine 15 mg q8h to maintain SBP>65  - Solumedrol decreased as above   - Levophed titrated off, will restart if needed during HD to maintain MAP >65  - CHF with severely reduced EF 15%, caution with fluids. Per renal recs, can give IV albumin for fluids.    # CHF exacerbation- CHF with EF 10-15% per pt 2/2 ischemic cardiomyopathy s/p AICD. Elevated BNP on admission with R sided effusion and edema  - persistent pulmonary vascular congestion on CXR with layering pleural effusions.   - Maintain negative fluid balance with dialysis, watching BP  - Unclear medical regimen opt. Per [facility] pharmacy ( and [place]) pt has not picked up Torsemide 20 or Metoprolol 100 since November.   - Hold ACE/Metoprolol in setting of sepsis/ hypotension    #AFIB- hx of Afib and LV thrombus on coumadin.    - Holding Metoprolol 12.5 q12h given hypotension. Will use Dig for rate control if RVR   - Holding coumadin given thrombocytopenia, will consider starting when plts stable/ uptrending  - S/p heparin gtt, discontinued 2/2 thrombocytopenia concern for HIT.  - TTE with affinity shows no  LV thrombus currently    #CAD- Hx of MI s/p AICD  - Pt with pLAD in 7/2017, was started on Aspirin and Brilinta per records.  - Holding asp 81, coumadin and atorvastatin (discontinued 2/2 LFT uptrend, thrombocytopenia).     #LE Edema   - LE edema with chronic venous stasis.   - Duplex does not show DVT    PULMONARY   #Acute Resp failure- S/p tracheostomy 4/5. Back on vent overnight with CPAP trial this morning. CXR today with worsened opacities on L and R. Bedside US with worsened simple b/l pleural effusions and atelectic lung. Less concern for infectious source given b/l effusions with no septations/loculations. Likely related to fluid overload. If clinically worsening, can consider thoracentesis for fluid studies and cultures.  -Continue CPAP trials, weaning to trach collar as tolerated.       RENAL   #ARF- likely ischemic ATN in setting of sepsis with low intravascular volume. Unknown baseline Cr presenting with Cr 3.93 with metabolic derangements. Renal team on board, pt with R HD catheter placed by IR on 3/21. Now on intermittent HD. HD catheter removed 4/8 due to fungemia.   -Place temporary HD catheter for dialysis today  - c/w intermittent HD per renal recs   - CT abdomen and pelvis without acute obstruction.   - retroperitoneal ultrasound showing medical renal disease.     #Hyperkalemia- PT with elevated K to 5.7 on admission,  no EKG changes. K again uptrended in setting of not getting dialysis.   -S/p kayexalate 30mg x2 yesterday.   - Continue telemetry monitoring  - Trend K   - c/w dialysis     #Metabolic acidosis   - 2/2 Lactate, starvation ketoacidosis and uremia   - resolved       GI   # transaminitis and elevated bili- Patient with transaminitis that has remained stable, however with new elevation in Bili to 2.1 today. Prior RUQ US with cholelithiasis and prior CT abdomen/pelvis consistent with cholelithiasis and ascites.   - Repeat RUQ US negative fo cholecysitis, rec HIDA if strong clinical suspicion. However patient is now found to be fungemic.     HEME  #Thrombocytopenia- ELVIRA negative but PF4 was positive. Unlikely HIT. Most likely 2/2 zosyn or sepsis   - Continue to hold coumadin and Aspirin  - Monitor for signs of bleeding   - transfuse if <10 or <50 bleeding    #Elevated INR. Unclear etiology.   - Holding coumadin given thrombocytopenia  - Given Vit K and FFP3/12. FFP 3/13, FFP 4/4  - Monitor coags    #Anemia- Likely 2/2 phlebotomy and CKD, no signs of bleeding. Prior studies showed Anemia of chronic disease.  -Continue to monitor  -s/p 1pRBC on 3/12, 3/16 and 4/4    ENDOCRINE   - Lantus 12U and start humalin 7units TID   - MISS  - monitor FSG and adjust as needed   - A1C 8.6%      F: No IVF   E: Replete K<4 Mg<2, caution in setting of acute renal failure  N: PEG Placed 4/4, 1.5 Glucerna      Lines:  Peripheral   Drips: None  Full code  Dispo: MICU  Ppx: PPI, no heparin/ coumadin given thrombocytopenia

## 2018-04-10 NOTE — CHART NOTE - NSCHARTNOTEFT_GEN_A_CORE
Admitting Diagnosis:   63M PMH HFrEF 10-15% (ischemic), MI, s/p AICD vs PPM, possible Afib, HTN, DM2 on insulin, possible CKD, and gout, who presents with a chief complaint of generalized weakness s/p septic shock likely 2/2 LE cellulitis. Now with AMS and new leukocytisis likely 2/2 UTI.    PAST MEDICAL & SURGICAL HISTORY:  Type 2 diabetes mellitus with diabetic peripheral angiopathy without gangrene, with long-term current  Essential hypertension, benign  Gout  Pacemaker  Chronic systolic heart failure  Myocardial infarction  No significant past surgical history      Current Nutrition Order:  2 Terrell HN @ 50mL/hr x 24hrs via PEG plus 2 Pkt ProStat (200 kcal, 30g protein) to provide in total: 1200 mL TV, 2600 kcal, 130g protein, 840mL free H2O, 127% RDI, 1.46g/kg IBW protein.     Currently NPO for RUKHSANA    PO Intake: Good (%) [   ]  Fair (50-75%) [   ] Poor (<25%) [   ]- N/A NPO    GI Issues: WDL    Pain: Unable to assess at this time 2/2 vent     Skin Integrity: L. buttock pressure injury    Labs:   04-10    142  |  99  |  94<H>  ----------------------------<  125<H>  5.3   |  24  |  4.44<H>    Ca    9.6      10 Apr 2018 11:28  Phos  1.1     04-10  Mg     2.1     04-10    TPro  7.0  /  Alb  3.2<L>  /  TBili  1.1  /  DBili  x   /  AST  47<H>  /  ALT  55<H>  /  AlkPhos  214<H>  04-10    CAPILLARY BLOOD GLUCOSE      POCT Blood Glucose.: 111 mg/dL (10 Apr 2018 11:42)  POCT Blood Glucose.: 211 mg/dL (10 Apr 2018 05:53)  POCT Blood Glucose.: 254 mg/dL (09 Apr 2018 21:34)  POCT Blood Glucose.: 199 mg/dL (09 Apr 2018 16:57)  POCT Blood Glucose.: 136 mg/dL (09 Apr 2018 13:40)      Medications:  MEDICATIONS  (STANDING):  albumin human 25% IVPB 50 milliLiter(s) IV Intermittent every 1 hour  chlorhexidine 0.12% Liquid 15 milliLiter(s) Swish and Spit two times a day  chlorhexidine 2% Cloths 1 Application(s) Topical daily  dextrose 5%. 1000 milliLiter(s) (50 mL/Hr) IV Continuous <Continuous>  dextrose 50% Injectable 12.5 Gram(s) IV Push once  dextrose 50% Injectable 25 Gram(s) IV Push once  dextrose 50% Injectable 25 Gram(s) IV Push once  ergocalciferol Drops 6000 Unit(s) Oral daily  escitalopram 5 milliGRAM(s) Oral daily  fentaNYL    Injectable 50 MICROGram(s) IV Push once  folic acid 1 milliGRAM(s) Oral daily  hydrocortisone sodium succinate Injectable 25 milliGRAM(s) IV Push every 24 hours  insulin glargine Injectable (LANTUS) 12 Unit(s) SubCutaneous at bedtime  insulin regular  human corrective regimen sliding scale   SubCutaneous every 6 hours  insulin regular  human recombinant 7 Unit(s) SubCutaneous every 6 hours  micafungin IVPB 100 milliGRAM(s) IV Intermittent every 24 hours  midazolam Injectable 4 milliGRAM(s) IV Push once  midodrine 15 milliGRAM(s) Oral every 8 hours  modafinil 100 milliGRAM(s) Oral <User Schedule>  multivitamin 1 Tablet(s) Oral daily  pantoprazole   Suspension 40 milliGRAM(s) Oral daily  thiamine 100 milliGRAM(s) Oral daily    MEDICATIONS  (PRN):  acetaminophen    Suspension. 650 milliGRAM(s) Oral every 6 hours PRN Moderate Pain (4 - 6)  dextrose Gel 1 Dose(s) Oral once PRN Blood Glucose LESS THAN 70 milliGRAM(s)/deciliter  glucagon  Injectable 1 milliGRAM(s) IntraMuscular once PRN Glucose LESS THAN 70 milligrams/deciliter      Weight: 77.2kg (4/7)  80.9 kg (4/6)  84.5kg (3/31)  86.9kg (3/19)  94.7 kg (3/16)      Weight Change: 17kg weight loss since admit (HD initiation)    Estimated energy needs using 89 kg IBW: Needs estimated 2/2 vent, CVVH  Calories: 25-30 kcal/kg = 4659-3783 kcal/day  Protein: 1.4-1.6 g/kg = 125-142 g protein/day  Fluids: 1000 mL + UOP 2/2 HD    Subjective: S/p trach and PEG placements on 4/4. Candidemia found in blood cultures with bandemia, likely intrabdominal source of infection. Pt seen in room, trached to vent on CPAP mode, MAP 72, off of pressors. NPO for RUKHSANA. Pt very lethargic, minimally arousable per RN, does not open eyes spontaneously. Pt also pulled out HD catheter, so next HD pending replacement. K trending up, s/p 2 doses kayexalate, currently 5.8. LFTs trending up as well.     Previous Nutrition Diagnosis: increased protein-calorie needs RT increased demand for protein-calorie intake AEB on vent support    Active [ X ]  Resolved [   ]    New PES statement:     Goal: Meet % of nutrition needs via tolerated route.     Recommendations:  1. Continue TF order of 2 Terrell HN @ 50mL/hr x24hrs plus 2 Pkts ProStat via PEG  2. Trend daily weights   3. Monitor FS   4. Continue to monitor for goals of care    Education: N/A-vent    Risk Level: High [ X  ] Moderate [   ] Low [   ].

## 2018-04-10 NOTE — PROGRESS NOTE ADULT - ASSESSMENT
64 yo male with ICM s/p ICD, prolonged hospitalization for LLE cellulitis, with course c/b ATN requiring initiation of HD, now with new fever and candidemia. The most likely source is the Permacath (which was removed yesterday). RUKHSANA with no evidence of infected ICD leads. Evaluated by Ophtho - no candidal endophthalmitis, will follow. On 4/10, Found to have left sided hemiplegia.    Recommend:   1. Continue on micafungin 100mg IV q24h at this time.  2. Follow up blood cultures  3. Follow up susceptibilities of C. tropicalis bcx isolate (being performed at [facility])  4. Would defer replacement of Permacath until bcx have been negative X 48h and deep-seated infection has been ruled out; temporary HD access may be used in the meantime.    Will follow. 62 yo male with ICM s/p ICD, prolonged hospitalization for LLE cellulitis, with course c/b ATN requiring initiation of HD, now with new fever and candidemia. The most likely source is the Permacath (which was removed 4/8). RUKHSANA with no evidence of IE or infected ICD leads. Evaluated by Ophtho - no candidal endophthalmitis, will follow. On 4/10, Found to have left sided hemiplegia.    Recommend:   1. Continue on micafungin 100mg IV q24h at this time.  2. Follow up blood cultures  3. Follow up susceptibilities of C. tropicalis bcx isolate (being performed at [facility])  4. Would defer replacement of Permacath until bcx have been negative X 48h and deep-seated infection has been ruled out; temporary HD access may be used in the meantime.    Will follow.

## 2018-04-10 NOTE — PROGRESS NOTE ADULT - SUBJECTIVE AND OBJECTIVE BOX
Patient was seen and evaluated on dialysis.   Patient is tolerating the procedure well.   HR: 96 (04-10-18 @ 19:00)  BP: 100/60 (04-10-18 @ 19:00)  Continue dialysis:   Dialyzer:   Revaclear 300       QB:  400      QD: 500 2K bath   Goal UF 2kg over 3 Hours

## 2018-04-10 NOTE — PROGRESS NOTE ADULT - ASSESSMENT
Mental status somewhat improved although per primary team worse now than a few days ago - likely some component of metabolic encephalopathy from infection. Roving eye movements are no longer present. Weakness in limbs could be from suspected cerebral ischemia vs. cervical cord compression vs. both. Given that he is not a surgical candidate at this time, would not pursue cervical spine issue currently, re-consider if overall medical status improves. Will continue to re-evaluate clinically.

## 2018-04-10 NOTE — PROGRESS NOTE ADULT - SUBJECTIVE AND OBJECTIVE BOX
INTERVAL HPI/OVERNIGHT EVENTS:    OVERNIGHT: No overnight events. Febrile to 101.9 rectally o/n, persistently tachy to 130s. . WBC 5.5 with 39% bands. Evaluated by Ophtho - no candidal endophthalmitis, will follow. Found to have left sided hemiplegia. RUKHSANA negative for infected ICD leads. Retaining in AM. Jose replaced.  SUBJECTIVE: Patient seen and examined at bedside. Unable to obtain ROS.     OBJECTIVE:    VITAL SIGNS:  ICU Vital Signs Last 24 Hrs  T(C): 37.9 (10 Apr 2018 10:00), Max: 38.8 (10 Apr 2018 01:32)  T(F): 100.3 (10 Apr 2018 10:00), Max: 101.9 (10 Apr 2018 01:32)  HR: 102 (10 Apr 2018 11:20) (94 - 132)  BP: 87/67 (10 Apr 2018 11:20) (78/64 - 103/80)  BP(mean): 73 (10 Apr 2018 11:20) (68 - 91)  ABP: --  ABP(mean): --  RR: 15 (10 Apr 2018 11:20) (11 - 43)  SpO2: 100% (10 Apr 2018 11:20) (97% - 100%)    Mode: AC/ CMV (Assist Control/ Continuous Mandatory Ventilation), RR (machine): 10, TV (machine): 500, FiO2: 40, PEEP: 5, ITime: 0.9, MAP: 8.4, PIP: 20    04-09 @ 07:01 - 04-10 @ 07:00  --------------------------------------------------------  IN: 1796 mL / OUT: 15 mL / NET: 1781 mL    04-10 @ 07:01  -  04-10 @ 12:04  --------------------------------------------------------  IN: 0 mL / OUT: 50 mL / NET: -50 mL      CAPILLARY BLOOD GLUCOSE      POCT Blood Glucose.: 111 mg/dL (10 Apr 2018 11:42)      PHYSICAL EXAM:    General: NAD, chronically ill appearing  HEENT: NCAT, PERRL, clear conjunctiva, no scleral icterus  Neck: supple, no JVD; with trach in place  Respiratory: CTA b/l, no wheezing, rhonchi, rales  Cardiovascular: RRR, normal S1S2, no M/R/G  Abdomen: soft, NT/ND  Extremities: WWP, no edema  Neuro: AOx0      MEDICATIONS:  MEDICATIONS  (STANDING):  albumin human 25% IVPB 50 milliLiter(s) IV Intermittent every 1 hour  chlorhexidine 0.12% Liquid 15 milliLiter(s) Swish and Spit two times a day  chlorhexidine 2% Cloths 1 Application(s) Topical daily  dextrose 5%. 1000 milliLiter(s) (50 mL/Hr) IV Continuous <Continuous>  dextrose 50% Injectable 12.5 Gram(s) IV Push once  dextrose 50% Injectable 25 Gram(s) IV Push once  dextrose 50% Injectable 25 Gram(s) IV Push once  ergocalciferol Drops 6000 Unit(s) Oral daily  escitalopram 5 milliGRAM(s) Oral daily  fentaNYL    Injectable 50 MICROGram(s) IV Push once  folic acid 1 milliGRAM(s) Oral daily  hydrocortisone sodium succinate Injectable 25 milliGRAM(s) IV Push every 24 hours  insulin glargine Injectable (LANTUS) 12 Unit(s) SubCutaneous at bedtime  insulin regular  human corrective regimen sliding scale   SubCutaneous every 6 hours  insulin regular  human recombinant 7 Unit(s) SubCutaneous every 6 hours  micafungin IVPB 100 milliGRAM(s) IV Intermittent every 24 hours  midazolam Injectable 4 milliGRAM(s) IV Push once  midodrine 15 milliGRAM(s) Oral every 8 hours  modafinil 100 milliGRAM(s) Oral <User Schedule>  multivitamin 1 Tablet(s) Oral daily  pantoprazole   Suspension 40 milliGRAM(s) Oral daily  thiamine 100 milliGRAM(s) Oral daily    MEDICATIONS  (PRN):  acetaminophen    Suspension. 650 milliGRAM(s) Oral every 6 hours PRN Moderate Pain (4 - 6)  dextrose Gel 1 Dose(s) Oral once PRN Blood Glucose LESS THAN 70 milliGRAM(s)/deciliter  glucagon  Injectable 1 milliGRAM(s) IntraMuscular once PRN Glucose LESS THAN 70 milligrams/deciliter      ALLERGIES:  Allergies    No Known Allergies    Intolerances        LABS:                        8.7    5.5   )-----------( 61       ( 10 Apr 2018 06:48 )             28.6     04-10    142  |  99  |  94<H>  ----------------------------<  125<H>  5.3   |  24  |  4.44<H>    Ca    9.6      10 Apr 2018 11:28  Phos  1.1     04-10  Mg     2.1     04-10    TPro  7.0  /  Alb  3.2<L>  /  TBili  1.1  /  DBili  x   /  AST  47<H>  /  ALT  55<H>  /  AlkPhos  214<H>  04-10    Culture - Fungal, Blood (04.10.18 @ 00:38)    Specimen Source: .Blood Blood-Peripheral    Culture Results:   Testing in progress    Fungal Susceptibility (04.09.18 @ 21:28)    Culture Results:   Candida tropicalis Susceptibility to follow.    Fungus Identification (04.09.18 @ 21:28)    Culture Results:   Candida tropicalis isolated    Culture - Blood (04.09.18 @ 10:52)    Specimen Source: .Blood Blood    Culture Results:   No growth at 1 day.    Culture - Blood (04.09.18 @ 10:52)    Specimen Source: .Blood Blood    Culture Results:   No growth at 1 day.    Culture - Catheter (04.08.18 @ 18:45)    Specimen Source: .Catheter Catheter Tip Subclavian    Culture Results:   No growth    Culture - Blood (04.06.18 @ 14:35)    Gram Stain:   Aerobic Bottle: Yeast like cells  Result called to and read back by_ Ms. JAE Chávez RN  04/07/2018  21:40:54  "Due to technical problems, Proteus sp. will Not be reported as part of  the BCID panel until further notice"  ***Blood Panel PCR results on this specimen are available  approximately 3 hours after the Gram stain result.***  Gram stain, PCR, and/or culture results may not always  correspond due to difference in methodologies.  ************************************************************  This PCR assay was performed using [employer].  The following targets are tested for: Enterococcus,  vancomycin resistant enterococci, Listeria monocytogenes,  coagulase negative staphylococci, S. aureus,  methicillin resistant S. aureus,Streptococcus agalactiae  (Group B), S. pneumoniae, S. pyogenes (Group A),  Acinetobacter baumannii, Enterobacter cloacae, E. coli,  Klebsiella oxytoca, K. pneumoniae, Proteus sp.,  Serratia marcescens, Haemophilus influenzae,  Neisseria meningitidis, Pseudomonas aeruginosa, Candida  albicans, C. glabrata, C krusei, C parapsilosis,  C. tropicalis and the KPC resistance gene.    -  Candida tropicalis: Detec    Specimen Source: .Blood Blood-Peripheral    Organism: Blood Culture PCR    Culture Results:   Growth in aerobic bottle: Yeast  Pending further identification and antibiotic susceptibility testing at  [facility]    Organism Identification: Blood Culture PCR    Method Type: PCR    Culture - Blood (04.06.18 @ 14:35)    Specimen Source: .Blood Blood    Culture Results:   No growth at 3 days.    Culture - Blood (04.04.18 @ 15:18)    Specimen Source: .Blood Blood-Peripheral    Culture Results:   No growth at 5 days.    Fungus Identification (03.29.18 @ 01:06)    Culture Results:   Sherrie dubliniensis isolated        RADIOLOGY & ADDITIONAL TESTS:

## 2018-04-10 NOTE — PROCEDURE NOTE - ADDITIONAL PROCEDURE DETAILS
R femoral HD cath placement
patient had a loose one incisor teeth that was removed prior to intubation as risk of dislodging and aspiration is high. Ofnote, pt was to have it pulled by a dentist before his admission here.

## 2018-04-10 NOTE — PROCEDURE NOTE - PRACTITIONER PERFORMING THE TIME OUT
Bryanna (patient's nurse)
Dr. Salazar, nurse, Dr. Mccollum
Dr. Salazar, nursing staff
Dr. Salazar, nursing staff
Judy Monterroso RN
MD Yoseph
Shakir Abreu MD
jurgen CAIN
MD Yoseph

## 2018-04-10 NOTE — PROVIDER CONTACT NOTE (MEDICATION) - ASSESSMENT
Patient noted to have sinus tachycardia upon reassessment. MATTHEW Hopkins made aware and present at bedside to assess patient. Bedside 12 lead ecg completed as per MD. Appropriate meds given as per MD order. Please see emr for details. Will continue to monitor.

## 2018-04-10 NOTE — PROGRESS NOTE ADULT - PROBLEM SELECTOR PLAN 1
Patient is a 63  year old male with acute on chronic renal failure from ischemic ATN. Patient is currently requiring hemodialysis. Patient was last dialyzed 4/7 with 2.8 L of UF. Patient's catheter was removed for fungemia     P - Patient is to have a temporary HD catheter placed today   Revaclear 300, , , 2K bath   goal UF 2kg over 3 hours   albumin [id number]   Patient was noted to have 300 cc of urine on the bladder scan. Please place a barnes catheter.

## 2018-04-10 NOTE — PROVIDER CONTACT NOTE (MEDICATION) - ACTION/TREATMENT ORDERED:
MATTHEW Hopkins made aware and present at bedside to assess patient. Bedside 12 lead ecg completed as per MD. Appropriate meds given as per MD order. Please see emr for details. Will continue to monitor.

## 2018-04-10 NOTE — PROGRESS NOTE ADULT - SUBJECTIVE AND OBJECTIVE BOX
Patient seen and examined at bedside. Patient is a 63 year old male with a history of HFrEF 10-15% due to ischemic cardiomyopathy, s/p AICD, ?atrial fibrillation, hypertension, diabetes mellitus type 2, gout, and CKD for whom nephrology was called for GILMER from ATN requiring hemodialysis. Patient had his catheter removed for fungemia. Patient appears clinically unchanged. Patient was last dialyzed 4/7 with UF of 2.8L     acetaminophen    Suspension. 650 milliGRAM(s) every 6 hours PRN  albumin human 25% IVPB 50 milliLiter(s) every 1 hour  chlorhexidine 0.12% Liquid 15 milliLiter(s) two times a day  chlorhexidine 2% Cloths 1 Application(s) daily  dextrose 5%. 1000 milliLiter(s) <Continuous>  dextrose 50% Injectable 12.5 Gram(s) once  dextrose 50% Injectable 25 Gram(s) once  dextrose 50% Injectable 25 Gram(s) once  dextrose Gel 1 Dose(s) once PRN  ergocalciferol Drops 6000 Unit(s) daily  escitalopram 5 milliGRAM(s) daily  fentaNYL    Injectable 50 MICROGram(s) once  folic acid 1 milliGRAM(s) daily  glucagon  Injectable 1 milliGRAM(s) once PRN  hydrocortisone sodium succinate Injectable 25 milliGRAM(s) every 24 hours  insulin glargine Injectable (LANTUS) 12 Unit(s) at bedtime  insulin regular  human corrective regimen sliding scale   every 6 hours  insulin regular  human recombinant 7 Unit(s) every 6 hours  micafungin IVPB 100 milliGRAM(s) every 24 hours  midazolam Injectable 4 milliGRAM(s) once  midodrine 15 milliGRAM(s) every 8 hours  modafinil 100 milliGRAM(s) <User Schedule>  multivitamin 1 Tablet(s) daily  pantoprazole   Suspension 40 milliGRAM(s) daily  sodium phosphate IVPB 30 milliMole(s) once  thiamine 100 milliGRAM(s) daily    Allergies    No Known Allergies    Intolerances    T(C): , Max: 38.8 (04-10-18 @ 01:32)  T(F): , Max: 101.9 (04-10-18 @ 01:32)  HR: 100 (04-10-18 @ 09:00)  BP: 84/67 (04-10-18 @ 09:00)  BP(mean): 72 (04-10-18 @ 09:00)  RR: 20 (04-10-18 @ 09:00)  SpO2: 100% (04-10-18 @ 09:00)    04-09 @ 07:01  -  04-10 @ 07:00  --------------------------------------------------------  IN:    Enteral Tube Flush: 540 mL    norepinephrine Infusion: 6 mL    Solution: 25 mL    Solution: 110 mL    TwoCal HN: 1115 mL  Total IN: 1796 mL    OUT:    Intermittent Catheterization - Urethral: 15 mL  Total OUT: 15 mL    Total NET: 1781 mL    04-10 @ 07:01  -  04-10 @ 10:40  --------------------------------------------------------  IN:  Total IN: 0 mL    OUT:    Rectal Tube: 50 mL  Total OUT: 50 mL    Total NET: -50 mL    LABS:                        8.7    5.5   )-----------( 61       ( 10 Apr 2018 06:48 )             28.6     04-10    142  |  100  |  87<H>  ----------------------------<  190<H>  5.8<H>   |  24  |  4.19<H>    Ca    9.1      10 Apr 2018 06:48  Phos  1.1     04-10  Mg     2.1     04-10    TPro  7.0  /  Alb  3.2<L>  /  TBili  1.1  /  DBili  x   /  AST  47<H>  /  ALT  55<H>  /  AlkPhos  214<H>  04-10

## 2018-04-10 NOTE — PROGRESS NOTE ADULT - SUBJECTIVE AND OBJECTIVE BOX
62 yo male POD #8 s/p trach with Renato flap with placement of 6.0 cuffed Shiley trach 4/4. Pt continues to be on ventilator and have fevers, no oozing or bleeding around trach, having increased secretions which are being suctioned.    PE:  NAD  [id number]  6.0 cuffed Shiley trach, cuff up, sutured in place, secured with soft neck tie, sutures removed without any issues  Duoderm under trach flanges  Trach site dry, no bleeding  Neck flat  Suction with moderate thick secretions, no blood    A/P: 63M s/p tracheotomy with Renato flap with placement of 6.0 cuffed Shiley trach 4/4.  - Suction trach with red rubber catheter prn  - Replace duoderm under trach flanges as needed to prevent pressure ulcers  - Sutures removed, continue to secure trach with trach tie  -Routine trach care -> change inner cannula q shift, humidified O2 via trach collar  -No further ENT intervention, if patient taken of ventilator and team would like trach changed to cuffless, can reconsult PRN/at that time  -Case discussed with Dr. Arteaga, please page with any questions

## 2018-04-11 LAB
ALBUMIN SERPL ELPH-MCNC: 3.7 G/DL — SIGNIFICANT CHANGE UP (ref 3.3–5)
ALP SERPL-CCNC: 210 U/L — HIGH (ref 40–120)
ALT FLD-CCNC: 41 U/L — SIGNIFICANT CHANGE UP (ref 10–45)
ANION GAP SERPL CALC-SCNC: 17 MMOL/L — SIGNIFICANT CHANGE UP (ref 5–17)
AST SERPL-CCNC: 27 U/L — SIGNIFICANT CHANGE UP (ref 10–40)
BASOPHILS NFR BLD AUTO: 1 % — SIGNIFICANT CHANGE UP (ref 0–2)
BILIRUB SERPL-MCNC: 1.5 MG/DL — HIGH (ref 0.2–1.2)
BUN SERPL-MCNC: 59 MG/DL — HIGH (ref 7–23)
CALCIUM SERPL-MCNC: 9.4 MG/DL — SIGNIFICANT CHANGE UP (ref 8.4–10.5)
CHLORIDE SERPL-SCNC: 94 MMOL/L — LOW (ref 96–108)
CO2 SERPL-SCNC: 28 MMOL/L — SIGNIFICANT CHANGE UP (ref 22–31)
CREAT SERPL-MCNC: 3.21 MG/DL — HIGH (ref 0.5–1.3)
CULTURE RESULTS: SIGNIFICANT CHANGE UP
CULTURE RESULTS: SIGNIFICANT CHANGE UP
GLUCOSE BLDC GLUCOMTR-MCNC: 132 MG/DL — HIGH (ref 70–99)
GLUCOSE BLDC GLUCOMTR-MCNC: 169 MG/DL — HIGH (ref 70–99)
GLUCOSE BLDC GLUCOMTR-MCNC: 169 MG/DL — HIGH (ref 70–99)
GLUCOSE BLDC GLUCOMTR-MCNC: 190 MG/DL — HIGH (ref 70–99)
GLUCOSE BLDC GLUCOMTR-MCNC: 242 MG/DL — HIGH (ref 70–99)
GLUCOSE SERPL-MCNC: 183 MG/DL — HIGH (ref 70–99)
HCT VFR BLD CALC: 26.2 % — LOW (ref 39–50)
HGB BLD-MCNC: 7.9 G/DL — LOW (ref 13–17)
LYMPHOCYTES # BLD AUTO: 6 % — LOW (ref 13–44)
MAGNESIUM SERPL-MCNC: 2.1 MG/DL — SIGNIFICANT CHANGE UP (ref 1.6–2.6)
MCHC RBC-ENTMCNC: 30.2 G/DL — LOW (ref 32–36)
MCHC RBC-ENTMCNC: 30.5 PG — SIGNIFICANT CHANGE UP (ref 27–34)
MCV RBC AUTO: 101.2 FL — HIGH (ref 80–100)
MONOCYTES NFR BLD AUTO: 5 % — SIGNIFICANT CHANGE UP (ref 2–14)
NEUTROPHILS NFR BLD AUTO: 56 % — SIGNIFICANT CHANGE UP (ref 43–77)
ORGANISM # SPEC MICROSCOPIC CNT: SIGNIFICANT CHANGE UP
ORGANISM # SPEC MICROSCOPIC CNT: SIGNIFICANT CHANGE UP
PHOSPHATE SERPL-MCNC: 3 MG/DL — SIGNIFICANT CHANGE UP (ref 2.5–4.5)
PLATELET # BLD AUTO: 94 K/UL — LOW (ref 150–400)
POTASSIUM SERPL-MCNC: 4.4 MMOL/L — SIGNIFICANT CHANGE UP (ref 3.5–5.3)
POTASSIUM SERPL-SCNC: 4.4 MMOL/L — SIGNIFICANT CHANGE UP (ref 3.5–5.3)
PROT SERPL-MCNC: 7.3 G/DL — SIGNIFICANT CHANGE UP (ref 6–8.3)
RBC # BLD: 2.59 M/UL — LOW (ref 4.2–5.8)
RBC # FLD: 17.5 % — HIGH (ref 10.3–16.9)
SODIUM SERPL-SCNC: 139 MMOL/L — SIGNIFICANT CHANGE UP (ref 135–145)
SPECIMEN SOURCE: SIGNIFICANT CHANGE UP
SPECIMEN SOURCE: SIGNIFICANT CHANGE UP
WBC # BLD: 11.2 K/UL — HIGH (ref 3.8–10.5)
WBC # FLD AUTO: 11.2 K/UL — HIGH (ref 3.8–10.5)

## 2018-04-11 PROCEDURE — 74177 CT ABD & PELVIS W/CONTRAST: CPT | Mod: 26

## 2018-04-11 PROCEDURE — 71045 X-RAY EXAM CHEST 1 VIEW: CPT | Mod: 26

## 2018-04-11 PROCEDURE — 99233 SBSQ HOSP IP/OBS HIGH 50: CPT | Mod: GC

## 2018-04-11 RX ORDER — NOREPINEPHRINE BITARTRATE/D5W 8 MG/250ML
0.01 PLASTIC BAG, INJECTION (ML) INTRAVENOUS
Qty: 8 | Refills: 0 | Status: DISCONTINUED | OUTPATIENT
Start: 2018-04-11 | End: 2018-04-12

## 2018-04-11 RX ORDER — INSULIN HUMAN 100 [IU]/ML
7 INJECTION, SOLUTION SUBCUTANEOUS EVERY 6 HOURS
Qty: 0 | Refills: 0 | Status: DISCONTINUED | OUTPATIENT
Start: 2018-04-11 | End: 2018-04-14

## 2018-04-11 RX ORDER — INSULIN GLARGINE 100 [IU]/ML
12 INJECTION, SOLUTION SUBCUTANEOUS AT BEDTIME
Qty: 0 | Refills: 0 | Status: DISCONTINUED | OUTPATIENT
Start: 2018-04-11 | End: 2018-04-15

## 2018-04-11 RX ADMIN — INSULIN HUMAN 7 UNIT(S): 100 INJECTION, SOLUTION SUBCUTANEOUS at 17:59

## 2018-04-11 RX ADMIN — INSULIN HUMAN 4: 100 INJECTION, SOLUTION SUBCUTANEOUS at 00:44

## 2018-04-11 RX ADMIN — INSULIN HUMAN 2: 100 INJECTION, SOLUTION SUBCUTANEOUS at 17:59

## 2018-04-11 RX ADMIN — Medication 25 MILLIGRAM(S): at 03:29

## 2018-04-11 RX ADMIN — MODAFINIL 100 MILLIGRAM(S): 200 TABLET ORAL at 11:41

## 2018-04-11 RX ADMIN — Medication 1 TABLET(S): at 11:39

## 2018-04-11 RX ADMIN — MICAFUNGIN SODIUM 105 MILLIGRAM(S): 100 INJECTION, POWDER, LYOPHILIZED, FOR SOLUTION INTRAVENOUS at 23:01

## 2018-04-11 RX ADMIN — INSULIN HUMAN 7 UNIT(S): 100 INJECTION, SOLUTION SUBCUTANEOUS at 23:01

## 2018-04-11 RX ADMIN — CHLORHEXIDINE GLUCONATE 1 APPLICATION(S): 213 SOLUTION TOPICAL at 06:00

## 2018-04-11 RX ADMIN — ERGOCALCIFEROL 6000 UNIT(S): 1.25 CAPSULE ORAL at 11:39

## 2018-04-11 RX ADMIN — INSULIN HUMAN 7 UNIT(S): 100 INJECTION, SOLUTION SUBCUTANEOUS at 11:40

## 2018-04-11 RX ADMIN — INSULIN GLARGINE 12 UNIT(S): 100 INJECTION, SOLUTION SUBCUTANEOUS at 23:01

## 2018-04-11 RX ADMIN — INSULIN HUMAN 2: 100 INJECTION, SOLUTION SUBCUTANEOUS at 08:49

## 2018-04-11 RX ADMIN — MIDODRINE HYDROCHLORIDE 15 MILLIGRAM(S): 2.5 TABLET ORAL at 03:29

## 2018-04-11 RX ADMIN — Medication 100 MILLIGRAM(S): at 11:39

## 2018-04-11 RX ADMIN — ESCITALOPRAM OXALATE 5 MILLIGRAM(S): 10 TABLET, FILM COATED ORAL at 12:51

## 2018-04-11 RX ADMIN — CHLORHEXIDINE GLUCONATE 15 MILLILITER(S): 213 SOLUTION TOPICAL at 11:39

## 2018-04-11 RX ADMIN — MICAFUNGIN SODIUM 105 MILLIGRAM(S): 100 INJECTION, POWDER, LYOPHILIZED, FOR SOLUTION INTRAVENOUS at 00:31

## 2018-04-11 RX ADMIN — CHLORHEXIDINE GLUCONATE 15 MILLILITER(S): 213 SOLUTION TOPICAL at 23:01

## 2018-04-11 RX ADMIN — INSULIN HUMAN 2: 100 INJECTION, SOLUTION SUBCUTANEOUS at 11:40

## 2018-04-11 RX ADMIN — MIDODRINE HYDROCHLORIDE 15 MILLIGRAM(S): 2.5 TABLET ORAL at 11:39

## 2018-04-11 RX ADMIN — Medication 1 MILLIGRAM(S): at 11:39

## 2018-04-11 RX ADMIN — MODAFINIL 100 MILLIGRAM(S): 200 TABLET ORAL at 06:47

## 2018-04-11 RX ADMIN — PANTOPRAZOLE SODIUM 40 MILLIGRAM(S): 20 TABLET, DELAYED RELEASE ORAL at 11:41

## 2018-04-11 RX ADMIN — MIDODRINE HYDROCHLORIDE 15 MILLIGRAM(S): 2.5 TABLET ORAL at 18:00

## 2018-04-11 NOTE — PROGRESS NOTE ADULT - PROBLEM SELECTOR PLAN 1
Patient is a 63  year old male with acute on chronic renal failure from ischemic ATN. Patient is currently requiring hemodialysis. Patient was last dialyzed 4/10 via a temporary catheter with UF of 2kg     P - No need for emergent dialysis today as volume status and electrolytes are acceptable   will reevaluate for dialysis tomorrow

## 2018-04-11 NOTE — PROGRESS NOTE ADULT - SUBJECTIVE AND OBJECTIVE BOX
INTERVAL HPI/OVERNIGHT EVENTS: 2 L removed with dialysis. After, patient required levophed for Bp support. 17 beats of Vtach recorded on tele, possibly artifact.     SUBJECTIVE: Patient seen and examined at bedside. Somnolent, grimaces but does not open eyes to sternal rub. ROS not obtainable.     OBJECTIVE:    VITAL SIGNS:  ICU Vital Signs Last 24 Hrs  T(C): 37.4 (11 Apr 2018 09:57), Max: 37.4 (10 Apr 2018 18:10)  T(F): 99.4 (11 Apr 2018 09:57), Max: 99.4 (11 Apr 2018 09:57)  HR: 86 (11 Apr 2018 13:00) (84 - 136)  BP: 85/69 (11 Apr 2018 13:00) (75/58 - 100/60)  BP(mean): 75 (11 Apr 2018 13:00) (63 - 89)  ABP: --  ABP(mean): --  RR: 12 (11 Apr 2018 13:00) (10 - 25)  SpO2: 100% (11 Apr 2018 13:00) (70% - 100%)    Mode: AC/ CMV (Assist Control/ Continuous Mandatory Ventilation), RR (machine): 10, TV (machine): 500, FiO2: 40, PEEP: 5, ITime: 0.9, MAP: 8.5, PIP: 18    04-10 @ 07:01 - 04-11 @ 07:00  --------------------------------------------------------  IN: 512 mL / OUT: 2050 mL / NET: -1538 mL    04-11 @ 07:01  -  04-11 @ 14:04  --------------------------------------------------------  IN: 177 mL / OUT: 50 mL / NET: 127 mL      CAPILLARY BLOOD GLUCOSE      POCT Blood Glucose.: 169 mg/dL (11 Apr 2018 11:14)      PHYSICAL EXAM:    General: Laying in bed, uncomfortable appearing but NAD  HEENT: NC/AT; clear conjunctiva  Neck: supple, + trach in place, milky secretions.   Respiratory: On ventilator. + Rhonchi b/l, no wheezing.   Cardiovascular: +S1/S2; RRR, no m/r/g  Abdomen: soft, NT/ND; +BS, PEG in place, no erythema or exudates  : +Jose in place, draining small amount of urine. No exudates.   Extremities: WWP, 2+ peripheral pulses b/l; 2+ LE edema  Skin: Chronic venous stasis changes of b/l LE.   Neurological: Somnolent, grimaces to sternal rub but will not open eyes. Inconsistently following commands. Moves RUE, no spontaneous movements of LUE, LLE, or RLE. Not withdrawing to pain in b/l UE.     MEDICATIONS:  MEDICATIONS  (STANDING):  chlorhexidine 0.12% Liquid 15 milliLiter(s) Swish and Spit two times a day  chlorhexidine 2% Cloths 1 Application(s) Topical daily  dextrose 5%. 1000 milliLiter(s) (50 mL/Hr) IV Continuous <Continuous>  dextrose 50% Injectable 12.5 Gram(s) IV Push once  dextrose 50% Injectable 25 Gram(s) IV Push once  dextrose 50% Injectable 25 Gram(s) IV Push once  ergocalciferol Drops 6000 Unit(s) Oral daily  escitalopram 5 milliGRAM(s) Oral daily  folic acid 1 milliGRAM(s) Oral daily  hydrocortisone sodium succinate Injectable 25 milliGRAM(s) IV Push every 24 hours  insulin glargine Injectable (LANTUS) 12 Unit(s) SubCutaneous at bedtime  insulin regular  human corrective regimen sliding scale   SubCutaneous every 6 hours  insulin regular  human recombinant 7 Unit(s) SubCutaneous every 6 hours  micafungin IVPB 100 milliGRAM(s) IV Intermittent every 24 hours  midodrine 15 milliGRAM(s) Oral every 8 hours  modafinil 100 milliGRAM(s) Oral <User Schedule>  multivitamin 1 Tablet(s) Oral daily  norepinephrine Infusion 0.01 MICROgram(s)/kG/Min (1.5 mL/Hr) IV Continuous <Continuous>  pantoprazole   Suspension 40 milliGRAM(s) Oral daily  thiamine 100 milliGRAM(s) Oral daily    MEDICATIONS  (PRN):  acetaminophen    Suspension. 650 milliGRAM(s) Oral every 6 hours PRN Moderate Pain (4 - 6)  dextrose Gel 1 Dose(s) Oral once PRN Blood Glucose LESS THAN 70 milliGRAM(s)/deciliter  glucagon  Injectable 1 milliGRAM(s) IntraMuscular once PRN Glucose LESS THAN 70 milligrams/deciliter      ALLERGIES:  Allergies    No Known Allergies    Intolerances        LABS:                        7.9    11.2  )-----------( 94       ( 11 Apr 2018 06:44 )             26.2     04-11    139  |  94<L>  |  59<H>  ----------------------------<  183<H>  4.4   |  28  |  3.21<H>    Ca    9.4      11 Apr 2018 06:44  Phos  3.0     04-11  Mg     2.1     04-11    TPro  7.3  /  Alb  3.7  /  TBili  1.5<H>  /  DBili  x   /  AST  27  /  ALT  41  /  AlkPhos  210<H>  04-11      RADIOLOGY & ADDITIONAL TESTS: Reviewed.

## 2018-04-11 NOTE — PROGRESS NOTE ADULT - SUBJECTIVE AND OBJECTIVE BOX
Patient seen and examined at bedside. Patient is a 63 year old male with a history of HFrEF 10-15% due to ischemic cardiomyopathy, s/p AICD, ?atrial fibrillation, hypertension, diabetes mellitus type 2, gout, and CKD for whom nephrology was called for GILMER from ATN requiring hemodialysis. Patient was last dialyzed 4/10 via temporary femoral catheter and attained a UF of 2kg.     acetaminophen    Suspension. 650 milliGRAM(s) every 6 hours PRN  chlorhexidine 0.12% Liquid 15 milliLiter(s) two times a day  chlorhexidine 2% Cloths 1 Application(s) daily  dextrose 5%. 1000 milliLiter(s) <Continuous>  dextrose 50% Injectable 12.5 Gram(s) once  dextrose 50% Injectable 25 Gram(s) once  dextrose 50% Injectable 25 Gram(s) once  dextrose Gel 1 Dose(s) once PRN  ergocalciferol Drops 6000 Unit(s) daily  escitalopram 5 milliGRAM(s) daily  folic acid 1 milliGRAM(s) daily  glucagon  Injectable 1 milliGRAM(s) once PRN  hydrocortisone sodium succinate Injectable 25 milliGRAM(s) every 24 hours  insulin glargine Injectable (LANTUS) 12 Unit(s) at bedtime  insulin regular  human corrective regimen sliding scale   every 6 hours  insulin regular  human recombinant 7 Unit(s) every 6 hours  micafungin IVPB 100 milliGRAM(s) every 24 hours  midodrine 15 milliGRAM(s) every 8 hours  modafinil 100 milliGRAM(s) <User Schedule>  multivitamin 1 Tablet(s) daily  norepinephrine Infusion 0.01 MICROgram(s)/kG/Min <Continuous>  pantoprazole   Suspension 40 milliGRAM(s) daily  thiamine 100 milliGRAM(s) daily    Allergies    No Known Allergies    Intolerances    T(C): , Max: 37.4 (04-10-18 @ 18:10)  T(F): , Max: 99.4 (04-11-18 @ 09:57)  HR: 100 (04-11-18 @ 10:00)  BP: 94/77 (04-11-18 @ 10:00)  BP(mean): 84 (04-11-18 @ 10:00)  RR: 13 (04-11-18 @ 10:00)  SpO2: 100% (04-11-18 @ 10:00)    04-10 @ 07:01 - 04-11 @ 07:00  --------------------------------------------------------  IN:    Enteral Tube Flush: 270 mL    norepinephrine Infusion: 42 mL    TwoCal HN: 200 mL  Total IN: 512 mL    OUT:    Other: 2000 mL    Rectal Tube: 50 mL  Total OUT: 2050 mL    Total NET: -1538 mL    04-11 @ 07:01 - 04-11 @ 10:54  --------------------------------------------------------  IN:    norepinephrine Infusion: 14 mL  Total IN: 14 mL    OUT:    Rectal Tube: 50 mL  Total OUT: 50 mL    Total NET: -36 mL    LABS:                        7.9    11.2  )-----------( 94       ( 11 Apr 2018 06:44 )             26.2     04-11    139  |  94<L>  |  59<H>  ----------------------------<  183<H>  4.4   |  28  |  3.21<H>    Ca    9.4      11 Apr 2018 06:44  Phos  3.0     04-11  Mg     2.1     04-11    TPro  7.3  /  Alb  3.7  /  TBili  1.5<H>  /  DBili  x   /  AST  27  /  ALT  41  /  AlkPhos  210<H>  04-11

## 2018-04-11 NOTE — PROGRESS NOTE ADULT - ATTENDING COMMENTS
Afebrile. Surveillance bcx ngtd (may defer repeating unless febrile again or decompensates). RUKHSANA negative for IE or ICD lead vegetations. Ophtho eval reported negative for endophthalmitis. C. tropicalis BSI likely 2/2 to PC (removed 4/8). Continue micafungin. No contraindication to PC replacement from ID perspective at this time as bcx negative and no evidence of deep-seated infection.

## 2018-04-11 NOTE — PROGRESS NOTE ADULT - ASSESSMENT
63M PMH HFrEF 10-15% (ischemic), MI, s/p AICD vs PPM, possible Afib, HTN, DM2 on insulin, possible CKD, and gout, who presented with a chief complaint of generalized weakness s/p septic shock likely 2/2 LE cellulitis. Now with AMS likely from brainstem infarct and/or toxic metabolic encephalopathy, now with candidemia.    NEURO  #AMS  - Pt non responsive since 3/22. Vital signs normal during the event. Pt intubated for airway protection. Stroke code called. CT, CTA negative. VEEG showed moderate slowing but no seizure activity. Patients mental status worsening/ not improving likely 2/2 to underlying infection. No spontaneous movement of LUE.   -Repeat CT head 4/11 perfomred due to concern for not moving LUE, however only showing only chronic infarctions.   - MRI (from 3/26) read updated today showing  C3/C4 level there is a an approximately 1.5 cm long extrusion coursing superiorly to the C2/C3 contacting the ventral spinal cord- Per neurology, this may be contributing to weakness, however would not explain change in mental status.   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 Per neuro, event could have resulted from hypoperfusion.   - C/w Modafinil.   - Unclear neuro prognosis at this point.   - TTE with Definity shows no clot at present  - S/p PEG/trach on 4/4     ID  #Candidemia with candida tropicalis: +SIRS- Bandemia  and fever. Blood cultures positive for candida tropicalis on 4/7. Source unclear at this time.   -CTAP 4/11- Negative for abscess  -F/u urine culture  -Surveillance fungal cultures 4/10 NGTD, however not finalized.   -Surveillance cx 4/9 again w/ candida  -C/w Micafungin 100mg q 24hrs  -RUKHSANA negative for vegetations/ infection of AICD  -Tunelled HD catheter removed 4/8, temp HD placed 4/10  - ID consulted, appreciate recommendations  - F/u repeat fungal cultures    #Septic shock 2/2 Cellulitis- Resolved  - C/w midodrine to 15 q8   - Decreased Solucortef 25mg q 24hrs given hyperglycemia/ infection, patient on for hypotension borderline ACTH test  - S/p zosyn for suspected UTI(3/25-3/26)  s/p Zosyn (3/11-2/21). Was previously also on Vanco (3/11-3/17).   - RUQ doppler shows no portal vein thrombosis. given worsening transaminitis and increased residuals RUQ sono was obtained which only shows cholelithiasis.   - TTE with no vegetations. Pt not stable for RUKHSANA.   - Gallium scan showed LLE cellulitis.     - repeat LE CT does not show abscess or gas  - HIV negative  - Vascular surgery on board. Recommending Amputation as an option if pt continues to be unstable.     #UTI- Urine cloudy appearing with increase WBC.- Resolved  - S/p zosyn 3/25-3/26    CARDIOVASCULAR   # HYPOTENSION-   - c/w Midodrine 15 mg q8h to maintain SBP>65  - Solumedrol decreased as above   - Levophed titrated off, will restart if needed during HD to maintain MAP >65  - CHF with severely reduced EF 15%, caution with fluids. Per renal recs, can give IV albumin for fluids.    # CHF exacerbation- CHF with EF 10-15% per pt 2/2 ischemic cardiomyopathy s/p AICD. Elevated BNP on admission with R sided effusion and edema  - persistent pulmonary vascular congestion on CXR with layering pleural effusions.   - Maintain negative fluid balance with dialysis, watching BP  - Unclear medical regimen opt. Per [facility] pharmacy ( and [place]) pt has not picked up Torsemide 20 or Metoprolol 100 since November.   - Hold ACE/Metoprolol in setting of sepsis/ hypotension    #AFIB- hx of Afib and LV thrombus on coumadin.    - Holding Metoprolol 12.5 q12h given hypotension. Will use Dig for rate control if RVR   - Holding coumadin given thrombocytopenia, will consider starting when plts stable/ uptrending  - S/p heparin gtt, discontinued 2/2 thrombocytopenia concern for HIT.  - TTE with affinity shows no  LV thrombus currently    #CAD- Hx of MI s/p AICD  - Pt with pLAD in 7/2017, was started on Aspirin and Brilinta per records.  - Holding asp 81, coumadin and atorvastatin (discontinued 2/2 LFT uptrend, thrombocytopenia).     #LE Edema   - LE edema with chronic venous stasis.   - Duplex does not show DVT    PULMONARY   #Acute Resp failure- S/p tracheostomy 4/5. Back on vent overnight with CPAP trial this morning. CXR today with worsened opacities on L and R. Bedside US with worsened simple b/l pleural effusions and atelectatic lung. Less concern for infectious source given b/l effusions with no septations/loculations. Likely related to fluid overload. If clinically worsening, can consider thoracentesis for fluid studies and cultures.  -Continue CPAP trials, weaning to trach collar as tolerated.       RENAL   #ARF- likely ischemic ATN in setting of sepsis with low intravascular volume. Unknown baseline Cr presenting with Cr 3.93 with metabolic derangements. Renal team on board, pt with R HD catheter placed by IR on 3/21. Now on intermittent HD. HD catheter removed 4/8 due to fungemia, temporary HD catheter placed 4/10.    -Last dialyzed 4/10, no need for dialysis today  - c/w intermittent HD per renal recs   - retroperitoneal ultrasound showing medical renal disease.     #Hyperkalemia- PT with elevated K to 5.7 on admission,  no EKG changes.  - Continue telemetry monitoring  - Trend K   - c/w dialysis   - Kayexalate PRN    #Metabolic acidosis   - 2/2 Lactate, starvation ketoacidosis and uremia   - resolved       GI   # transaminitis and elevated bili- Patient with transaminitis that has remained stable, however with new elevation in Bili to 2.1 today. Prior RUQ US with cholelithiasis and prior CT abdomen/pelvis consistent with cholelithiasis and ascites.   - Repeat RUQ US negative fo cholecysitis, rec HIDA if strong clinical suspicion. However patient is now found to be fungemic.     HEME  #Thrombocytopenia- ELVIRA negative but PF4 was positive. Unlikely HIT. Most likely 2/2 zosyn or sepsis   - Continue to hold coumadin and Aspirin  - Monitor for signs of bleeding   - transfuse if <10 or <50 bleeding    #Elevated INR. Unclear etiology.   - Holding coumadin given thrombocytopenia  - Given Vit K and FFP3/12. FFP 3/13, FFP 4/4  - Monitor coags    #Anemia- Likely 2/2 phlebotomy and CKD, no signs of bleeding. Prior studies showed Anemia of chronic disease.  -Continue to monitor  -s/p 1pRBC on 3/12, 3/16 and 4/4    ENDOCRINE   - Lantus 12U and start humalin 7units TID   - MISS  - monitor FSG and adjust as needed   - A1C 8.6%      F: No IVF   E: Replete K<4 Mg<2, caution in setting of acute renal failure  N: PEG Placed 4/4, 1.5 Glucerna      Lines:  Peripheral, R inguinal temp HD catheter (4/10)  Drips: None  Full code  Dispo: MICU  Ppx: PPI, no heparin/ coumadin given thrombocytopenia

## 2018-04-11 NOTE — PROGRESS NOTE ADULT - ASSESSMENT
64 yo male with ICM s/p ICD, prolonged hospitalization for LLE cellulitis, with course c/b ATN requiring initiation of HD, now with new fever and candidemia. The most likely source is the Permacath (which was removed 4/8). RUKHSANA with no evidence of IE or infected ICD leads. Evaluated by Ophtho - no candidal endophthalmitis, will follow. On 4/10, Found to have left sided hemiplegia.    Recommend:   1. Continue on micafungin 100mg IV q24h at this time.  2. Follow up blood cultures negative to date.  3. Follow up UA.  4. NO CI for replacement of Permacath at this time.    Will follow.

## 2018-04-11 NOTE — PROGRESS NOTE ADULT - SUBJECTIVE AND OBJECTIVE BOX
INTERVAL HPI/OVERNIGHT EVENTS:    OVERNIGHT: No overnight events. Afebrile. WBC to 11.  SUBJECTIVE: Patient seen and examined at bedside.     ROS:  Unable to obtain.    OBJECTIVE:    VITAL SIGNS:  ICU Vital Signs Last 24 Hrs  T(C): 37.4 (11 Apr 2018 09:57), Max: 37.4 (10 Apr 2018 18:10)  T(F): 99.4 (11 Apr 2018 09:57), Max: 99.4 (11 Apr 2018 09:57)  HR: 94 (11 Apr 2018 15:00) (84 - 136)  BP: 90/72 (11 Apr 2018 14:00) (75/58 - 100/60)  BP(mean): 77 (11 Apr 2018 14:00) (63 - 89)  ABP: --  ABP(mean): --  RR: 10 (11 Apr 2018 15:00) (9 - 25)  SpO2: 100% (11 Apr 2018 15:00) (70% - 100%)    Mode: AC/ CMV (Assist Control/ Continuous Mandatory Ventilation), RR (machine): 10, TV (machine): 500, FiO2: 40, PEEP: 5, ITime: 0.9, MAP: 8.5, PIP: 18    04-10 @ 07:01 - 04-11 @ 07:00  --------------------------------------------------------  IN: 512 mL / OUT: 2050 mL / NET: -1538 mL    04-11 @ 07:01 - 04-11 @ 15:01  --------------------------------------------------------  IN: 177 mL / OUT: 50 mL / NET: 127 mL      CAPILLARY BLOOD GLUCOSE      POCT Blood Glucose.: 169 mg/dL (11 Apr 2018 11:14)      PHYSICAL EXAM:    General: NAD, chronically ill appearing  HEENT: NCAT, PERRL, clear conjunctiva, no scleral icterus  Neck: supple, no JVD; with trach in place  Respiratory: CTA b/l, no wheezing, rhonchi, rales  Cardiovascular: RRR, normal S1S2, no M/R/G  Abdomen: soft, NT/ND, new HD cath in R groin  Extremities: WWP, no edema  Neuro: AOx0    MEDICATIONS:  MEDICATIONS  (STANDING):  chlorhexidine 0.12% Liquid 15 milliLiter(s) Swish and Spit two times a day  chlorhexidine 2% Cloths 1 Application(s) Topical daily  dextrose 5%. 1000 milliLiter(s) (50 mL/Hr) IV Continuous <Continuous>  dextrose 50% Injectable 12.5 Gram(s) IV Push once  dextrose 50% Injectable 25 Gram(s) IV Push once  dextrose 50% Injectable 25 Gram(s) IV Push once  ergocalciferol Drops 6000 Unit(s) Oral daily  escitalopram 5 milliGRAM(s) Oral daily  folic acid 1 milliGRAM(s) Oral daily  hydrocortisone sodium succinate Injectable 25 milliGRAM(s) IV Push every 24 hours  insulin glargine Injectable (LANTUS) 12 Unit(s) SubCutaneous at bedtime  insulin regular  human corrective regimen sliding scale   SubCutaneous every 6 hours  insulin regular  human recombinant 7 Unit(s) SubCutaneous every 6 hours  micafungin IVPB 100 milliGRAM(s) IV Intermittent every 24 hours  midodrine 15 milliGRAM(s) Oral every 8 hours  modafinil 100 milliGRAM(s) Oral <User Schedule>  multivitamin 1 Tablet(s) Oral daily  norepinephrine Infusion 0.01 MICROgram(s)/kG/Min (1.5 mL/Hr) IV Continuous <Continuous>  pantoprazole   Suspension 40 milliGRAM(s) Oral daily  thiamine 100 milliGRAM(s) Oral daily    MEDICATIONS  (PRN):  acetaminophen    Suspension. 650 milliGRAM(s) Oral every 6 hours PRN Moderate Pain (4 - 6)  dextrose Gel 1 Dose(s) Oral once PRN Blood Glucose LESS THAN 70 milliGRAM(s)/deciliter  glucagon  Injectable 1 milliGRAM(s) IntraMuscular once PRN Glucose LESS THAN 70 milligrams/deciliter      ALLERGIES:  Allergies    No Known Allergies    Intolerances        LABS:                        7.9    11.2  )-----------( 94       ( 11 Apr 2018 06:44 )             26.2     04-11    139  |  94<L>  |  59<H>  ----------------------------<  183<H>  4.4   |  28  |  3.21<H>    Ca    9.4      11 Apr 2018 06:44  Phos  3.0     04-11  Mg     2.1     04-11    TPro  7.3  /  Alb  3.7  /  TBili  1.5<H>  /  DBili  x   /  AST  27  /  ALT  41  /  AlkPhos  210<H>  04-11    Culture - Fungal, Blood (04.10.18 @ 22:55)    Specimen Source: .Blood Blood-Arterial    Culture Results:   Testing in progress    Culture - Fungal, Blood (04.10.18 @ 00:38)    Specimen Source: .Blood Blood-Peripheral    Culture Results:   Testing in progress    Fungal Susceptibility (04.09.18 @ 21:28)    Culture Results:   Candida tropicalis Susceptibility to follow.    Fungus Identification (04.09.18 @ 21:28)    Culture Results:   Candida tropicalis isolated    Culture - Blood (04.09.18 @ 10:52)    Specimen Source: .Blood Blood    Culture Results:   No growth at 2 days.    Culture - Blood (04.09.18 @ 10:52)    Specimen Source: .Blood Blood    Culture Results:   No growth at 2 days.          RADIOLOGY & ADDITIONAL TESTS:   < from: CT Abdomen and Pelvis w/ Oral Cont and w/ IV Cont (04.11.18 @ 08:15) >  IMPRESSION:  1.  No renal abscess identified. No hydroureteronephrosis.    2.  Interval increase in moderate to large ascites.     3.  Interval increase in moderate right and small left pleural effusions.    4.  Newly placed PEG tube is within the gastric lumen.    5.  Anasarca    < end of copied text >    < from: CT Head No Cont (04.10.18 @ 13:32) >  Impression: Bilateral occipital chronic infarcts. Tiny left posterior   temporal chronic infarction. Tiny left frontal chronic infarct. Tiny left   chronic external capsule infarction. Prominence of the ventricles,   unchanged. Mild microvascular disease. No acute intracranial injury.        < end of copied text >

## 2018-04-12 LAB
-  5-FLUCYTOSINE: SIGNIFICANT CHANGE UP
-  AMPHOTERICIN B: SIGNIFICANT CHANGE UP
-  ANIDULAFUNGIN: SIGNIFICANT CHANGE UP
-  CASPOFUNGIN: SIGNIFICANT CHANGE UP
-  FLUCONAZOLE: SIGNIFICANT CHANGE UP
-  ITRACONAZOLE: SIGNIFICANT CHANGE UP
-  MICAFUNGIN: SIGNIFICANT CHANGE UP
-  POSACONAZOLE: SIGNIFICANT CHANGE UP
-  VORICONAZOLE: SIGNIFICANT CHANGE UP
ALBUMIN SERPL ELPH-MCNC: 3.1 G/DL — LOW (ref 3.3–5)
ALP SERPL-CCNC: 171 U/L — HIGH (ref 40–120)
ALT FLD-CCNC: 32 U/L — SIGNIFICANT CHANGE UP (ref 10–45)
ANION GAP SERPL CALC-SCNC: 17 MMOL/L — SIGNIFICANT CHANGE UP (ref 5–17)
ANISOCYTOSIS BLD QL: SLIGHT — SIGNIFICANT CHANGE UP
AST SERPL-CCNC: 20 U/L — SIGNIFICANT CHANGE UP (ref 10–40)
BILIRUB SERPL-MCNC: 0.9 MG/DL — SIGNIFICANT CHANGE UP (ref 0.2–1.2)
BUN SERPL-MCNC: 73 MG/DL — HIGH (ref 7–23)
CALCIUM SERPL-MCNC: 9.1 MG/DL — SIGNIFICANT CHANGE UP (ref 8.4–10.5)
CHLORIDE SERPL-SCNC: 96 MMOL/L — SIGNIFICANT CHANGE UP (ref 96–108)
CO2 SERPL-SCNC: 26 MMOL/L — SIGNIFICANT CHANGE UP (ref 22–31)
CREAT SERPL-MCNC: 3.87 MG/DL — HIGH (ref 0.5–1.3)
CULTURE RESULTS: SIGNIFICANT CHANGE UP
GLUCOSE BLDC GLUCOMTR-MCNC: 101 MG/DL — HIGH (ref 70–99)
GLUCOSE BLDC GLUCOMTR-MCNC: 213 MG/DL — HIGH (ref 70–99)
GLUCOSE BLDC GLUCOMTR-MCNC: 77 MG/DL — SIGNIFICANT CHANGE UP (ref 70–99)
GLUCOSE BLDC GLUCOMTR-MCNC: 84 MG/DL — SIGNIFICANT CHANGE UP (ref 70–99)
GLUCOSE SERPL-MCNC: 124 MG/DL — HIGH (ref 70–99)
HBV SURFACE AG SER-ACNC: SIGNIFICANT CHANGE UP
HCT VFR BLD CALC: 26.8 % — LOW (ref 39–50)
HGB BLD-MCNC: 8.3 G/DL — LOW (ref 13–17)
LYMPHOCYTES # BLD AUTO: 7 % — LOW (ref 13–44)
MACROCYTES BLD QL: SLIGHT — SIGNIFICANT CHANGE UP
MAGNESIUM SERPL-MCNC: 2 MG/DL — SIGNIFICANT CHANGE UP (ref 1.6–2.6)
MANUAL DIF COMMENT BLD-IMP: SIGNIFICANT CHANGE UP
MANUAL SMEAR VERIFICATION: SIGNIFICANT CHANGE UP
MCHC RBC-ENTMCNC: 30.1 PG — SIGNIFICANT CHANGE UP (ref 27–34)
MCHC RBC-ENTMCNC: 31 G/DL — LOW (ref 32–36)
MCV RBC AUTO: 97.1 FL — SIGNIFICANT CHANGE UP (ref 80–100)
METHOD TYPE: SIGNIFICANT CHANGE UP
MICROCYTES BLD QL: SLIGHT — SIGNIFICANT CHANGE UP
MONOCYTES NFR BLD AUTO: 5 % — SIGNIFICANT CHANGE UP (ref 2–14)
NEUTROPHILS NFR BLD AUTO: 67 % — SIGNIFICANT CHANGE UP (ref 43–77)
NEUTS BAND # BLD: 21 % — HIGH
ORGANISM # SPEC MICROSCOPIC CNT: SIGNIFICANT CHANGE UP
ORGANISM # SPEC MICROSCOPIC CNT: SIGNIFICANT CHANGE UP
PHOSPHATE SERPL-MCNC: 3.3 MG/DL — SIGNIFICANT CHANGE UP (ref 2.5–4.5)
PLAT MORPH BLD: (no result)
PLATELET # BLD AUTO: 134 K/UL — LOW (ref 150–400)
POIKILOCYTOSIS BLD QL AUTO: SLIGHT — SIGNIFICANT CHANGE UP
POLYCHROMASIA BLD QL SMEAR: SLIGHT — SIGNIFICANT CHANGE UP
POTASSIUM SERPL-MCNC: 4 MMOL/L — SIGNIFICANT CHANGE UP (ref 3.5–5.3)
POTASSIUM SERPL-SCNC: 4 MMOL/L — SIGNIFICANT CHANGE UP (ref 3.5–5.3)
PROT SERPL-MCNC: 6.4 G/DL — SIGNIFICANT CHANGE UP (ref 6–8.3)
RBC # BLD: 2.76 M/UL — LOW (ref 4.2–5.8)
RBC # FLD: 17.9 % — HIGH (ref 10.3–16.9)
RBC BLD AUTO: (no result)
SODIUM SERPL-SCNC: 139 MMOL/L — SIGNIFICANT CHANGE UP (ref 135–145)
WBC # BLD: 10.1 K/UL — SIGNIFICANT CHANGE UP (ref 3.8–10.5)
WBC # FLD AUTO: 10.1 K/UL — SIGNIFICANT CHANGE UP (ref 3.8–10.5)

## 2018-04-12 PROCEDURE — 99233 SBSQ HOSP IP/OBS HIGH 50: CPT | Mod: GC

## 2018-04-12 PROCEDURE — 99233 SBSQ HOSP IP/OBS HIGH 50: CPT

## 2018-04-12 PROCEDURE — 36558 INSERT TUNNELED CV CATH: CPT | Mod: 58

## 2018-04-12 PROCEDURE — 71045 X-RAY EXAM CHEST 1 VIEW: CPT | Mod: 26

## 2018-04-12 PROCEDURE — 77001 FLUOROGUIDE FOR VEIN DEVICE: CPT | Mod: 26

## 2018-04-12 PROCEDURE — 76937 US GUIDE VASCULAR ACCESS: CPT | Mod: 26

## 2018-04-12 RX ORDER — ALBUMIN HUMAN 25 %
50 VIAL (ML) INTRAVENOUS
Qty: 0 | Refills: 0 | Status: COMPLETED | OUTPATIENT
Start: 2018-04-12 | End: 2018-04-12

## 2018-04-12 RX ORDER — LOPERAMIDE HCL 2 MG
2 TABLET ORAL ONCE
Qty: 0 | Refills: 0 | Status: DISCONTINUED | OUTPATIENT
Start: 2018-04-12 | End: 2018-04-13

## 2018-04-12 RX ORDER — FLUCONAZOLE 150 MG/1
200 TABLET ORAL EVERY 24 HOURS
Qty: 0 | Refills: 0 | Status: DISCONTINUED | OUTPATIENT
Start: 2018-04-12 | End: 2018-04-18

## 2018-04-12 RX ADMIN — ERGOCALCIFEROL 6000 UNIT(S): 1.25 CAPSULE ORAL at 12:16

## 2018-04-12 RX ADMIN — MIDODRINE HYDROCHLORIDE 15 MILLIGRAM(S): 2.5 TABLET ORAL at 12:16

## 2018-04-12 RX ADMIN — Medication 50 MILLILITER(S): at 11:14

## 2018-04-12 RX ADMIN — MODAFINIL 100 MILLIGRAM(S): 200 TABLET ORAL at 06:11

## 2018-04-12 RX ADMIN — PANTOPRAZOLE SODIUM 40 MILLIGRAM(S): 20 TABLET, DELAYED RELEASE ORAL at 12:16

## 2018-04-12 RX ADMIN — ESCITALOPRAM OXALATE 5 MILLIGRAM(S): 10 TABLET, FILM COATED ORAL at 17:40

## 2018-04-12 RX ADMIN — Medication 1 TABLET(S): at 12:16

## 2018-04-12 RX ADMIN — INSULIN HUMAN 4: 100 INJECTION, SOLUTION SUBCUTANEOUS at 06:11

## 2018-04-12 RX ADMIN — MIDODRINE HYDROCHLORIDE 15 MILLIGRAM(S): 2.5 TABLET ORAL at 19:20

## 2018-04-12 RX ADMIN — INSULIN GLARGINE 12 UNIT(S): 100 INJECTION, SOLUTION SUBCUTANEOUS at 21:13

## 2018-04-12 RX ADMIN — MODAFINIL 100 MILLIGRAM(S): 200 TABLET ORAL at 16:16

## 2018-04-12 RX ADMIN — CHLORHEXIDINE GLUCONATE 15 MILLILITER(S): 213 SOLUTION TOPICAL at 12:16

## 2018-04-12 RX ADMIN — Medication 50 MILLILITER(S): at 10:25

## 2018-04-12 RX ADMIN — CHLORHEXIDINE GLUCONATE 1 APPLICATION(S): 213 SOLUTION TOPICAL at 06:13

## 2018-04-12 RX ADMIN — Medication 1 MILLIGRAM(S): at 12:17

## 2018-04-12 RX ADMIN — FLUCONAZOLE 100 MILLIGRAM(S): 150 TABLET ORAL at 23:55

## 2018-04-12 RX ADMIN — Medication 100 MILLIGRAM(S): at 12:16

## 2018-04-12 RX ADMIN — Medication 50 MILLILITER(S): at 12:04

## 2018-04-12 RX ADMIN — INSULIN HUMAN 7 UNIT(S): 100 INJECTION, SOLUTION SUBCUTANEOUS at 06:11

## 2018-04-12 RX ADMIN — Medication 1.5 MICROGRAM(S)/KG/MIN: at 11:36

## 2018-04-12 RX ADMIN — Medication 25 MILLIGRAM(S): at 03:21

## 2018-04-12 RX ADMIN — MIDODRINE HYDROCHLORIDE 15 MILLIGRAM(S): 2.5 TABLET ORAL at 02:35

## 2018-04-12 RX ADMIN — CHLORHEXIDINE GLUCONATE 15 MILLILITER(S): 213 SOLUTION TOPICAL at 21:13

## 2018-04-12 NOTE — PROGRESS NOTE ADULT - SUBJECTIVE AND OBJECTIVE BOX
Patient was seen and evaluated on dialysis.   Patient is tolerating the procedure well.   HR: 83 (04-12-18 @ 10:20)  BP: 81/64 (04-12-18 @ 10:20)  Continue dialysis:   Dialyzer: Revaclear 300 QB: 300   Goal UF 1kg over 2.5 Hours

## 2018-04-12 NOTE — CHART NOTE - NSCHARTNOTEFT_GEN_A_CORE
Admitting Diagnosis:   63M PMH HFrEF 10-15% (ischemic), MI, s/p AICD vs PPM, possible Afib, HTN, DM2 on insulin, possible CKD, and gout, who presents with a chief complaint of generalized weakness s/p septic shock likely 2/2 LE cellulitis. AMS and new leukocytisis likely 2/2 UTI. New presentation of candidemia likely from intraabdominal source.     PAST MEDICAL & SURGICAL HISTORY:  Type 2 diabetes mellitus with diabetic peripheral angiopathy without gangrene, with long-term current  Essential hypertension, benign  Gout  Pacemaker  Chronic systolic heart failure  Myocardial infarction  No significant past surgical history      Current Nutrition Order:  2 Terrell HN @ 50mL/hr x 24hrs via PEG plus 2 Pkt ProStat (200 kcal, 30g protein) to provide in total: 1200 mL TV, 2600 kcal, 130g protein, 840mL free H2O, 127% RDI, 1.46g/kg IBW protein.   Currently NPO/TF held for possible procedure     PO Intake: Good (%) [   ]  Fair (50-75%) [   ] Poor (<25%) [   ]- N/A NPO    GI Issues: Rectal tube in place for diarrhea; discussion with MD of addition of imodium, and monitoring diarrhea now that TF held     Pain: Unable to assess at this time 2/2 vent     Skin Integrity: L. buttock pressure injury      Labs:       139  |  96  |  73<H>  ----------------------------<  124<H>  4.0   |  26  |  3.87<H>    Ca    9.1      2018 03:25  Phos  3.3       Mg     2.0         TPro  6.4  /  Alb  3.1<L>  /  TBili  0.9  /  DBili  x   /  AST  20  /  ALT  32  /  AlkPhos  171<H>      CAPILLARY BLOOD GLUCOSE      POCT Blood Glucose.: 213 mg/dL (2018 06:03)  POCT Blood Glucose.: 132 mg/dL (2018 22:56)  POCT Blood Glucose.: 190 mg/dL (2018 17:21)      Medications:  MEDICATIONS  (STANDING):  albumin human 25% IVPB 50 milliLiter(s) IV Intermittent every 1 hour  chlorhexidine 0.12% Liquid 15 milliLiter(s) Swish and Spit two times a day  chlorhexidine 2% Cloths 1 Application(s) Topical daily  dextrose 5%. 1000 milliLiter(s) (50 mL/Hr) IV Continuous <Continuous>  dextrose 50% Injectable 12.5 Gram(s) IV Push once  dextrose 50% Injectable 25 Gram(s) IV Push once  dextrose 50% Injectable 25 Gram(s) IV Push once  ergocalciferol Drops 6000 Unit(s) Oral daily  escitalopram 5 milliGRAM(s) Oral daily  folic acid 1 milliGRAM(s) Oral daily  hydrocortisone sodium succinate Injectable 25 milliGRAM(s) IV Push every 24 hours  insulin glargine Injectable (LANTUS) 12 Unit(s) SubCutaneous at bedtime  insulin regular  human corrective regimen sliding scale   SubCutaneous every 6 hours  insulin regular  human recombinant 7 Unit(s) SubCutaneous every 6 hours  micafungin IVPB 100 milliGRAM(s) IV Intermittent every 24 hours  midodrine 15 milliGRAM(s) Oral every 8 hours  modafinil 100 milliGRAM(s) Oral <User Schedule>  multivitamin 1 Tablet(s) Oral daily  norepinephrine Infusion 0.01 MICROgram(s)/kG/Min (1.5 mL/Hr) IV Continuous <Continuous>  pantoprazole   Suspension 40 milliGRAM(s) Oral daily  thiamine 100 milliGRAM(s) Oral daily    MEDICATIONS  (PRN):  acetaminophen    Suspension. 650 milliGRAM(s) Oral every 6 hours PRN Moderate Pain (4 - 6)  dextrose Gel 1 Dose(s) Oral once PRN Blood Glucose LESS THAN 70 milliGRAM(s)/deciliter  glucagon  Injectable 1 milliGRAM(s) IntraMuscular once PRN Glucose LESS THAN 70 milligrams/deciliter      Weight:  Daily     Daily Weight in k.1 ( pre HD)  77.2kg ()  80.9 kg ()  84.5kg (3/31)  86.9kg (3/19)  94.7 kg (3/16)      Weight Change: 18kg weight loss since admit (HD initiation)    Estimated energy needs using 89 kg IBW: Needs estimated 2/2 vent, CVVH  Calories: 25-30 kcal/kg = 4845-9352 kcal/day  Protein: 1.4-1.6 g/kg = 125-142 g protein/day  Fluids: 1000 mL + UOP / HD    Subjective: S/p trach and PEG placements on . Candidemia found in blood cultures with bandemia, likely intraabdominal source of infection. Pt seen in room, trached to vent on CPAP, . Increased levophed requirements for HD. TF currently being held 2/ possible procedure and diarrhea. Rectal tube in place. Pt remains minimally arousable per RN, very lethargic and non-alert. , 132mg/dL, lytes WNL.     Previous Nutrition Diagnosis: increased protein-calorie needs RT increased demand for protein-calorie intake AEB on vent support    Active [ X ]  Resolved [   ]    New PES statement:     Goal: Continue to meet % of nutrition needs via tolerated route.     Recommendations:  1. Continue TF order of 2 Terrell HN @ 50mL/hr x24hrs plus 2 Pkts ProStat via PEG --> volume-based feeding protocol   2. **Addition of imodium- monitor bowel function, especially while TF held   *If diarrhea persists with imodium, consider changing to Osmolite 1.2 Terrell   3. Continue to trend weights pre/post HD  4. Monitor FS   5. Continue to monitor for goals of care    Education: N/A-vent    Risk Level: High [ X  ] Moderate [   ] Low [   ]

## 2018-04-12 NOTE — PROGRESS NOTE ADULT - SUBJECTIVE AND OBJECTIVE BOX
Patient seen and examined at bedside. Patient is a 63 year old male with a history of HFrEF 10-15% due to ischemic cardiomyopathy, s/p AICD, ?atrial fibrillation, hypertension, diabetes mellitus type 2, gout, and CKD for whom nephrology was called for GILMER from ATN requiring hemodialysis. Patient was last dialyzed 4/10 via temporary femoral catheter. Patient is planned for permcath placement today.     acetaminophen    Suspension. 650 milliGRAM(s) every 6 hours PRN  albumin human 25% IVPB 50 milliLiter(s) every 1 hour  chlorhexidine 0.12% Liquid 15 milliLiter(s) two times a day  chlorhexidine 2% Cloths 1 Application(s) daily  dextrose 5%. 1000 milliLiter(s) <Continuous>  dextrose 50% Injectable 12.5 Gram(s) once  dextrose 50% Injectable 25 Gram(s) once  dextrose 50% Injectable 25 Gram(s) once  dextrose Gel 1 Dose(s) once PRN  ergocalciferol Drops 6000 Unit(s) daily  escitalopram 5 milliGRAM(s) daily  folic acid 1 milliGRAM(s) daily  glucagon  Injectable 1 milliGRAM(s) once PRN  hydrocortisone sodium succinate Injectable 25 milliGRAM(s) every 24 hours  insulin glargine Injectable (LANTUS) 12 Unit(s) at bedtime  insulin regular  human corrective regimen sliding scale   every 6 hours  insulin regular  human recombinant 7 Unit(s) every 6 hours  micafungin IVPB 100 milliGRAM(s) every 24 hours  midodrine 15 milliGRAM(s) every 8 hours  modafinil 100 milliGRAM(s) <User Schedule>  multivitamin 1 Tablet(s) daily  norepinephrine Infusion 0.01 MICROgram(s)/kG/Min <Continuous>  pantoprazole   Suspension 40 milliGRAM(s) daily  thiamine 100 milliGRAM(s) daily    Allergies    No Known Allergies    Intolerances    T(C): , Max: 37.4 (04-11-18 @ 18:00)  T(F): , Max: 99.4 (04-11-18 @ 18:00)  HR: 92 (04-12-18 @ 10:00)  BP: 83/62 (04-12-18 @ 10:00)  BP(mean): 67 (04-12-18 @ 10:00)  RR: 26 (04-12-18 @ 10:00)  SpO2: 100% (04-12-18 @ 10:00)    04-11 @ 07:01  -  04-12 @ 07:00  --------------------------------------------------------  IN:    Enteral Tube Flush: 200 mL    norepinephrine Infusion: 31 mL    TwoCal HN: 950 mL  Total IN: 1181 mL    OUT:    Rectal Tube: 550 mL  Total OUT: 550 mL    Total NET: 631 mL    04-12 @ 07:01 - 04-12 @ 10:42  --------------------------------------------------------  IN:  Total IN: 0 mL    OUT:    Rectal Tube: 250 mL  Total OUT: 250 mL    Total NET: -250 mL    LABS:                        8.3    10.1  )-----------( 134      ( 12 Apr 2018 03:25 )             26.8     04-12    139  |  96  |  73<H>  ----------------------------<  124<H>  4.0   |  26  |  3.87<H>    Ca    9.1      12 Apr 2018 03:25  Phos  3.3     04-12  Mg     2.0     04-12    TPro  6.4  /  Alb  3.1<L>  /  TBili  0.9  /  DBili  x   /  AST  20  /  ALT  32  /  AlkPhos  171<H>  04-12

## 2018-04-12 NOTE — PROGRESS NOTE ADULT - ASSESSMENT
64 yo male with ICM s/p ICD, prolonged hospitalization for LLE cellulitis, with course c/b ATN requiring initiation of HD, now with new fever and candidemia. The most likely source is the Permacath (which was removed 4/8). RUKHSANA with no evidence of IE or infected ICD leads. Evaluated by Ophtho - no candidal endophthalmitis, will follow. On 4/10, Found to have left sided hemiplegia.    Recommend:   1. Continue on micafungin 100mg IV q24h at this time.  2. Follow up blood cultures negative to date.  3. NO CI for replacement of Permacath at this time.  4. Continue to hold antibiotics at this time. If patient clinically decompensates, would consider replacing barnes, obtaining UA and repeat UCx, and treating empirically with antibiotics.    ID will follow. 64 yo male with ICM s/p ICD, prolonged hospitalization for LLE cellulitis, with course c/b ATN requiring initiation of HD, now with new fever and candidemia. The most likely source is the Permacath (which was removed 4/8). RUKHSANA with no evidence of IE or infected ICD leads. Evaluated by Ophtho - no candidal endophthalmitis, will follow. On 4/10, Found to have left sided hemiplegia.    Recommend:   1. C. tropicalis isolate returned as fluconazole susceptible--d/c micafungin and start fluconazole 200mg IV q24h  2. Follow up blood cultures negative to date.  3. NO CI for replacement of Permacath at this time.  4. Continue to hold antibiotics at this time. If patient clinically decompensates, would consider replacing barnes, obtaining UA and repeat UCx, and treating empirically with antibiotics.    ID will follow.

## 2018-04-12 NOTE — PROGRESS NOTE ADULT - SUBJECTIVE AND OBJECTIVE BOX
INTERVAL HPI/OVERNIGHT EVENTS: FS between 130-213. Still with diarrhea, did not remove rectal tube    SUBJECTIVE: Patient seen and examined at bedside. Patient somnolent, grimaces to sternal rub but not opening eyes. Intermittently following commancds.    *Later during am rounds patient with eyes open, more alert appearing.     OBJECTIVE:    VITAL SIGNS:  ICU Vital Signs Last 24 Hrs  T(C): 35.8 (12 Apr 2018 10:20), Max: 37.4 (11 Apr 2018 18:00)  T(F): 96.5 (12 Apr 2018 10:20), Max: 99.4 (11 Apr 2018 18:00)  HR: 86 (12 Apr 2018 11:00) (66 - 98)  BP: 77/59 (12 Apr 2018 11:00) (76/60 - 97/80)  BP(mean): 64 (12 Apr 2018 11:00) (64 - 91)  ABP: --  ABP(mean): --  RR: 21 (12 Apr 2018 11:00) (9 - 28)  SpO2: 100% (12 Apr 2018 11:00) (85% - 100%)    Mode: AC/ CMV (Assist Control/ Continuous Mandatory Ventilation), RR (machine): 10, TV (machine): 500, FiO2: 40, PEEP: 5, ITime: 0.9, MAP: 7.4, PIP: 15    04-11 @ 07:01 - 04-12 @ 07:00  --------------------------------------------------------  IN: 1181 mL / OUT: 550 mL / NET: 631 mL    04-12 @ 07:01  -  04-12 @ 11:40  --------------------------------------------------------  IN: 25 mL / OUT: 250 mL / NET: -225 mL      CAPILLARY BLOOD GLUCOSE      POCT Blood Glucose.: 213 mg/dL (12 Apr 2018 06:03)      PHYSICAL EXAM:    General: NAD, uncomfortable appearing, laying in bed.   HEENT: NC/AT; PERRL, clear conjunctiva  Neck: + trach in place, small amount of mucous  Respiratory: B/l rhonchi, no wheezing. On vent, AC VC.   Cardiovascular: +S1/S2; RRR, no m/r/g  Abdomen: soft, NT/ND; +BS x4  Extremities: WWP, 2+ peripheral pulses b/l; 2+ dependent edema  Skin: Chronic venous skin cahnges of b/l LE.   Neurological: Somnolent, grimaces to sternal rub but will not open eyes. Inconsistently following commands. Weak hand  of RUE, otherwise no movement of extremities spontaneously or on command. Not withdrawing to pain in all 4 extremities.     MEDICATIONS:  MEDICATIONS  (STANDING):  albumin human 25% IVPB 50 milliLiter(s) IV Intermittent every 1 hour  chlorhexidine 0.12% Liquid 15 milliLiter(s) Swish and Spit two times a day  chlorhexidine 2% Cloths 1 Application(s) Topical daily  dextrose 5%. 1000 milliLiter(s) (50 mL/Hr) IV Continuous <Continuous>  dextrose 50% Injectable 12.5 Gram(s) IV Push once  dextrose 50% Injectable 25 Gram(s) IV Push once  dextrose 50% Injectable 25 Gram(s) IV Push once  ergocalciferol Drops 6000 Unit(s) Oral daily  escitalopram 5 milliGRAM(s) Oral daily  folic acid 1 milliGRAM(s) Oral daily  hydrocortisone sodium succinate Injectable 25 milliGRAM(s) IV Push every 24 hours  insulin glargine Injectable (LANTUS) 12 Unit(s) SubCutaneous at bedtime  insulin regular  human corrective regimen sliding scale   SubCutaneous every 6 hours  insulin regular  human recombinant 7 Unit(s) SubCutaneous every 6 hours  micafungin IVPB 100 milliGRAM(s) IV Intermittent every 24 hours  midodrine 15 milliGRAM(s) Oral every 8 hours  modafinil 100 milliGRAM(s) Oral <User Schedule>  multivitamin 1 Tablet(s) Oral daily  norepinephrine Infusion 0.01 MICROgram(s)/kG/Min (1.5 mL/Hr) IV Continuous <Continuous>  pantoprazole   Suspension 40 milliGRAM(s) Oral daily  thiamine 100 milliGRAM(s) Oral daily    MEDICATIONS  (PRN):  acetaminophen    Suspension. 650 milliGRAM(s) Oral every 6 hours PRN Moderate Pain (4 - 6)  dextrose Gel 1 Dose(s) Oral once PRN Blood Glucose LESS THAN 70 milliGRAM(s)/deciliter  glucagon  Injectable 1 milliGRAM(s) IntraMuscular once PRN Glucose LESS THAN 70 milligrams/deciliter      ALLERGIES:  Allergies    No Known Allergies    Intolerances        LABS:                        8.3    10.1  )-----------( 134      ( 12 Apr 2018 03:25 )             26.8     04-12    139  |  96  |  73<H>  ----------------------------<  124<H>  4.0   |  26  |  3.87<H>    Ca    9.1      12 Apr 2018 03:25  Phos  3.3     04-12  Mg     2.0     04-12    TPro  6.4  /  Alb  3.1<L>  /  TBili  0.9  /  DBili  x   /  AST  20  /  ALT  32  /  AlkPhos  171<H>  04-12        RADIOLOGY & ADDITIONAL TESTS: Reviewed.

## 2018-04-12 NOTE — PROGRESS NOTE ADULT - SUBJECTIVE AND OBJECTIVE BOX
INTERVAL HPI/OVERNIGHT EVENTS:    OVERNIGHT: No overnight events. Afebrile. WB 11.2 to 10.1. Getting HD today.  SUBJECTIVE: Patient seen and examined at bedside. Patient not responding to questions. Still having diarrhea via rectal tube.    OBJECTIVE:    VITAL SIGNS:  ICU Vital Signs Last 24 Hrs  T(C): 35.8 (12 Apr 2018 10:20), Max: 37.4 (11 Apr 2018 18:00)  T(F): 96.5 (12 Apr 2018 10:20), Max: 99.4 (11 Apr 2018 18:00)  HR: 86 (12 Apr 2018 11:00) (66 - 98)  BP: 77/59 (12 Apr 2018 11:00) (76/60 - 97/80)  BP(mean): 64 (12 Apr 2018 11:00) (64 - 91)  ABP: --  ABP(mean): --  RR: 21 (12 Apr 2018 11:00) (9 - 28)  SpO2: 100% (12 Apr 2018 11:50) (85% - 100%)    Mode: AC/ CMV (Assist Control/ Continuous Mandatory Ventilation), RR (machine): 10, TV (machine): 500, FiO2: 40, PEEP: 5, ITime: 0.9, MAP: 8, PIP: 18    04-11 @ 07:01  -  04-12 @ 07:00  --------------------------------------------------------  IN: 1181 mL / OUT: 550 mL / NET: 631 mL    04-12 @ 07:01  -  04-12 @ 12:21  --------------------------------------------------------  IN: 25 mL / OUT: 250 mL / NET: -225 mL      CAPILLARY BLOOD GLUCOSE  POCT Blood Glucose.: 213 mg/dL (12 Apr 2018 06:03)    PHYSICAL EXAM:    General: NAD, chronically ill appearing  HEENT: NCAT, PERRL, clear conjunctiva, no scleral icterus  Neck: supple, no JVD; with trach in place  Respiratory: CTA b/l, no wheezing, rhonchi, rales  Cardiovascular: RRR, normal S1S2, no M/R/G  Abdomen: soft, NT/ND, HD cath in R groin  Extremities: WWP, no edema  Neuro: AOx0    MEDICATIONS:  MEDICATIONS  (STANDING):  chlorhexidine 0.12% Liquid 15 milliLiter(s) Swish and Spit two times a day  chlorhexidine 2% Cloths 1 Application(s) Topical daily  dextrose 5%. 1000 milliLiter(s) (50 mL/Hr) IV Continuous <Continuous>  dextrose 50% Injectable 12.5 Gram(s) IV Push once  dextrose 50% Injectable 25 Gram(s) IV Push once  dextrose 50% Injectable 25 Gram(s) IV Push once  ergocalciferol Drops 6000 Unit(s) Oral daily  escitalopram 5 milliGRAM(s) Oral daily  folic acid 1 milliGRAM(s) Oral daily  hydrocortisone sodium succinate Injectable 25 milliGRAM(s) IV Push every 24 hours  insulin glargine Injectable (LANTUS) 12 Unit(s) SubCutaneous at bedtime  insulin regular  human corrective regimen sliding scale   SubCutaneous every 6 hours  insulin regular  human recombinant 7 Unit(s) SubCutaneous every 6 hours  micafungin IVPB 100 milliGRAM(s) IV Intermittent every 24 hours  midodrine 15 milliGRAM(s) Oral every 8 hours  modafinil 100 milliGRAM(s) Oral <User Schedule>  multivitamin 1 Tablet(s) Oral daily  norepinephrine Infusion 0.01 MICROgram(s)/kG/Min (1.5 mL/Hr) IV Continuous <Continuous>  pantoprazole   Suspension 40 milliGRAM(s) Oral daily  thiamine 100 milliGRAM(s) Oral daily    MEDICATIONS  (PRN):  acetaminophen    Suspension. 650 milliGRAM(s) Oral every 6 hours PRN Moderate Pain (4 - 6)  dextrose Gel 1 Dose(s) Oral once PRN Blood Glucose LESS THAN 70 milliGRAM(s)/deciliter  glucagon  Injectable 1 milliGRAM(s) IntraMuscular once PRN Glucose LESS THAN 70 milligrams/deciliter    ALLERGIES:  No Known Allergies    LABS:                        8.3    10.1  )-----------( 134      ( 12 Apr 2018 03:25 )             26.8     04-12    139  |  96  |  73<H>  ----------------------------<  124<H>  4.0   |  26  |  3.87<H>    Ca    9.1      12 Apr 2018 03:25  Phos  3.3     04-12  Mg     2.0     04-12    TPro  6.4  /  Alb  3.1<L>  /  TBili  0.9  /  DBili  x   /  AST  20  /  ALT  32  /  AlkPhos  171<H>  04-12      RADIOLOGY & ADDITIONAL TESTS: Studies reviewed.

## 2018-04-12 NOTE — PROGRESS NOTE ADULT - ASSESSMENT
Mental status improved overall likely as fungemia is being treated  Twitching movements likely myoclonus from uremia / electrolyte shifts during HD - inform me if they do not spontaneously resolve  Would get CT C spine when stable to look at cord compression indicated on MRI brain to help with neurologic prognostication

## 2018-04-12 NOTE — PROGRESS NOTE ADULT - PROBLEM SELECTOR PLAN 1
Patient is a 63  year old male with acute on chronic renal failure from ischemic ATN. Patient is currently requiring hemodialysis. Patient was last dialyzed 4/10 via a temporary catheter with UF of 2kg     P - Will do dialysis today for UF only   UF goal of 2kg over 3 hours

## 2018-04-12 NOTE — PROGRESS NOTE ADULT - SUBJECTIVE AND OBJECTIVE BOX
Per MICU team he is hemodynamically more stable although still requiring pressors at times after HD especially.     Exam:  MS: nods head "yes" when asked if he can hear examiner, tries to give thumbs up and show two fingers with right hand   CN: does not blink to threat, no roving eye movements as was present earlier, opens eyes to name at times  Motor: increased tone throughout, left arm extended right arm flexed, moves right arm spontaneously, moves legs minimally to command, does not move left arm	  Intermittent small twitching movements of lips and right hand

## 2018-04-12 NOTE — PROGRESS NOTE ADULT - ASSESSMENT
63M PMH HFrEF 10-15% (ischemic), MI, s/p AICD vs PPM, possible Afib, HTN, DM2 on insulin, possible CKD, and gout, who presented with a chief complaint of generalized weakness s/p septic shock likely 2/2 LE cellulitis. Now with AMS likely from brainstem infarct and/or toxic metabolic encephalopathy, now with candidemia.    NEURO  #AMS  - Pt non responsive since 3/22. Vital signs normal during the event. Pt intubated for airway protection. Stroke code called. CT, CTA negative. VEEG showed moderate slowing but no seizure activity. Patients mental status worsening/ not improving likely 2/2 to underlying infection. No spontaneous movement of LUE.   -Repeat CT head 4/11 performed due to concern for not moving LUE, however only showing only chronic infarctions.   - MRI (from 3/26) read addendum showing  C3/C4 level there is a an approximately 1.5 cm long extrusion coursing superiorly to the C2/C3 contacting the ventral spinal cord- Per neurology, this may be contributing to weakness, however would not explain change in mental status.   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 Per neuro, event could have resulted from hypoperfusion.   - C/w Modafinil.   - Unclear neuro prognosis at this point.   - TTE with Definity shows no clot at present  - S/p PEG/trach on 4/4     ID  #Candidemia with candida tropicalis: +SIRS- Bandemia  and fever. Blood cultures positive for candida tropicalis on 4/7. Source unclear at this time.   -CTAP 4/11- Negative for abscess  -Urine culture growing GNR  -Surveillance fungal cultures 4/10 NGTD, however not finalized.   -Surveillance cx 4/9 again w/ candida  -C/w Micafungin 100mg q 24hrs  -RUKHSANA negative for vegetations/ infection of AICD  -Tunelled HD catheter removed 4/8, temp HD placed 4/10  - ID consulted, appreciate recommendations      #Septic shock 2/2 Cellulitis- Resolved  - C/w midodrine to 15 q8   - Decreased Solucortef 25mg q 24hrs given hyperglycemia/ infection, patient on for hypotension borderline ACTH test  - S/p zosyn for suspected UTI(3/25-3/26)  s/p Zosyn (3/11-2/21). Was previously also on Vanco (3/11-3/17).   - RUQ doppler shows no portal vein thrombosis. given worsening transaminitis and increased residuals RUQ sono was obtained which only shows cholelithiasis.   - TTE with no vegetations. Pt not stable for RUKHSANA.   - Gallium scan showed LLE cellulitis.     - repeat LE CT does not show abscess or gas  - HIV negative  - Vascular surgery on board. Recommending Amputation as an option if pt continues to be unstable.     #UTI- Urine cloudy appearing with increase WBC.- Resolved  - S/p zosyn 3/25-3/26    CARDIOVASCULAR   # HYPOTENSION-   - c/w Midodrine 15 mg q8h to maintain SBP>65  - Solumedrol decreased as above   - Levophed titrated off, will restart if needed during HD to maintain MAP >65  - CHF with severely reduced EF 15%, caution with fluids. Per renal recs, can give IV albumin for fluids.    # CHF exacerbation- CHF with EF 10-15% per pt 2/2 ischemic cardiomyopathy s/p AICD. Elevated BNP on admission with R sided effusion and edema  - persistent pulmonary vascular congestion on CXR with layering pleural effusions.   - Maintain negative fluid balance with dialysis, watching BP  - Unclear medical regimen opt. Per [facility] pharmacy ( and [facility]) pt has not picked up Torsemide 20 or Metoprolol 100 since November.   - Hold ACE/Metoprolol in setting of sepsis/ hypotension    #AFIB- hx of Afib and LV thrombus on coumadin.    - Holding Metoprolol 12.5 q12h given hypotension. Will use Dig for rate control if RVR   - Holding coumadin given thrombocytopenia, will consider starting when plts stable/ uptrending  - S/p heparin gtt, discontinued 2/2 thrombocytopenia concern for HIT.  - TTE with affinity shows no  LV thrombus currently    #CAD- Hx of MI s/p AICD  - Pt with pLAD in 7/2017, was started on Aspirin and Brilinta per records.  - Holding asp 81, coumadin and atorvastatin (discontinued 2/2 LFT uptrend, thrombocytopenia).     #LE Edema   - LE edema with chronic venous stasis.   - Duplex does not show DVT    PULMONARY   #Acute Resp failure- S/p tracheostomy 4/5. Back on vent overnight with CPAP trial this morning. CXR today with worsened opacities on L and R. Bedside US with worsened simple b/l pleural effusions and atelectatic lung. Less concern for infectious source given b/l effusions with no septations/loculations. Likely related to fluid overload. If clinically worsening, can consider thoracentesis for fluid studies and cultures.  -Continue CPAP trials, weaning to trach collar as tolerated.       RENAL   #ARF- likely ischemic ATN in setting of sepsis with low intravascular volume. Unknown baseline Cr presenting with Cr 3.93 with metabolic derangements. Renal team on board, pt with R HD catheter placed by IR on 3/21. Now on intermittent HD. HD catheter removed 4/8 due to fungemia, temporary HD catheter placed 4/10.    -Last dialyzed 4/10, planned for dialysis today  -Replace permacath after dialysis, cultures clear x 48hrs, approved by ID  - c/w intermittent HD per renal recs   - retroperitoneal ultrasound showing medical renal disease.     #Hyperkalemia- PT with elevated K to 5.7 on admission,  no EKG changes.  - Continue telemetry monitoring  - Trend K   - c/w dialysis   - Kayexalate PRN    #Metabolic acidosis   - 2/2 Lactate, starvation ketoacidosis and uremia   - resolved       GI   # transaminitis and elevated bili- Patient with transaminitis that has remained stable, however with new elevation in Bili to 2.1 today. Prior RUQ US with cholelithiasis and prior CT abdomen/pelvis consistent with cholelithiasis and ascites.   - Repeat RUQ US negative fo cholecysitis, rec HIDA if strong clinical suspicion. However patient is now found to be fungemic.     HEME  #Thrombocytopenia- ELVIRA negative but PF4 was positive. Unlikely HIT. Most likely 2/2 zosyn or sepsis   - Continue to hold coumadin and Aspirin  - Monitor for signs of bleeding   - transfuse if <10 or <50 bleeding    #Elevated INR. Unclear etiology.   - Holding coumadin given thrombocytopenia  - Given Vit K and FFP3/12. FFP 3/13, FFP 4/4  - Monitor coags    #Anemia- Likely 2/2 phlebotomy and CKD, no signs of bleeding. Prior studies showed Anemia of chronic disease.  -Continue to monitor  -s/p 1pRBC on 3/12, 3/16 and 4/4    ENDOCRINE   - Lantus 12U and start humalin 7units TID   - MISS  - monitor FSG and adjust as needed   - A1C 8.6%      F: No IVF   E: Replete K<4 Mg<2, caution in setting of acute renal failure  N: PEG Placed 4/4, 1.5 Glucerna      Lines:  Peripheral, R inguinal temp HD catheter (4/10)  Drips: None  Full code  Dispo: MICU  Ppx: PPI, no heparin/ coumadin given thrombocytopenia

## 2018-04-13 LAB
-  AMPICILLIN/SULBACTAM: SIGNIFICANT CHANGE UP
-  AMPICILLIN: SIGNIFICANT CHANGE UP
-  CEFAZOLIN: SIGNIFICANT CHANGE UP
-  CEFTRIAXONE: SIGNIFICANT CHANGE UP
-  CIPROFLOXACIN: SIGNIFICANT CHANGE UP
-  GENTAMICIN: SIGNIFICANT CHANGE UP
-  K. PNEUMONIAE GROUP: SIGNIFICANT CHANGE UP
-  NITROFURANTOIN: SIGNIFICANT CHANGE UP
-  PIPERACILLIN/TAZOBACTAM: SIGNIFICANT CHANGE UP
-  TOBRAMYCIN: SIGNIFICANT CHANGE UP
-  TRIMETHOPRIM/SULFAMETHOXAZOLE: SIGNIFICANT CHANGE UP
ANION GAP SERPL CALC-SCNC: 20 MMOL/L — HIGH (ref 5–17)
ANION GAP SERPL CALC-SCNC: 21 MMOL/L — HIGH (ref 5–17)
BUN SERPL-MCNC: 85 MG/DL — HIGH (ref 7–23)
BUN SERPL-MCNC: 86 MG/DL — HIGH (ref 7–23)
C DIFF GDH STL QL: NEGATIVE — SIGNIFICANT CHANGE UP
C DIFF GDH STL QL: SIGNIFICANT CHANGE UP
CALCIUM SERPL-MCNC: 8.8 MG/DL — SIGNIFICANT CHANGE UP (ref 8.4–10.5)
CALCIUM SERPL-MCNC: 9.2 MG/DL — SIGNIFICANT CHANGE UP (ref 8.4–10.5)
CHLORIDE SERPL-SCNC: 94 MMOL/L — LOW (ref 96–108)
CHLORIDE SERPL-SCNC: 96 MMOL/L — SIGNIFICANT CHANGE UP (ref 96–108)
CO2 SERPL-SCNC: 22 MMOL/L — SIGNIFICANT CHANGE UP (ref 22–31)
CO2 SERPL-SCNC: 23 MMOL/L — SIGNIFICANT CHANGE UP (ref 22–31)
CREAT SERPL-MCNC: 4.38 MG/DL — HIGH (ref 0.5–1.3)
CREAT SERPL-MCNC: 4.46 MG/DL — HIGH (ref 0.5–1.3)
CULTURE RESULTS: SIGNIFICANT CHANGE UP
GLUCOSE BLDC GLUCOMTR-MCNC: 102 MG/DL — HIGH (ref 70–99)
GLUCOSE BLDC GLUCOMTR-MCNC: 109 MG/DL — HIGH (ref 70–99)
GLUCOSE BLDC GLUCOMTR-MCNC: 142 MG/DL — HIGH (ref 70–99)
GLUCOSE BLDC GLUCOMTR-MCNC: 166 MG/DL — HIGH (ref 70–99)
GLUCOSE BLDC GLUCOMTR-MCNC: 214 MG/DL — HIGH (ref 70–99)
GLUCOSE BLDC GLUCOMTR-MCNC: 74 MG/DL — SIGNIFICANT CHANGE UP (ref 70–99)
GLUCOSE BLDC GLUCOMTR-MCNC: 82 MG/DL — SIGNIFICANT CHANGE UP (ref 70–99)
GLUCOSE SERPL-MCNC: 151 MG/DL — HIGH (ref 70–99)
GLUCOSE SERPL-MCNC: 83 MG/DL — SIGNIFICANT CHANGE UP (ref 70–99)
GRAM STN FLD: SIGNIFICANT CHANGE UP
HCT VFR BLD CALC: 26.7 % — LOW (ref 39–50)
HGB BLD-MCNC: 8.2 G/DL — LOW (ref 13–17)
LACTATE SERPL-SCNC: 1.6 MMOL/L — SIGNIFICANT CHANGE UP (ref 0.5–2)
LYMPHOCYTES # BLD AUTO: 2 % — LOW (ref 13–44)
MAGNESIUM SERPL-MCNC: 2 MG/DL — SIGNIFICANT CHANGE UP (ref 1.6–2.6)
MCHC RBC-ENTMCNC: 29.5 PG — SIGNIFICANT CHANGE UP (ref 27–34)
MCHC RBC-ENTMCNC: 30.7 G/DL — LOW (ref 32–36)
MCV RBC AUTO: 96 FL — SIGNIFICANT CHANGE UP (ref 80–100)
METHOD TYPE: SIGNIFICANT CHANGE UP
METHOD TYPE: SIGNIFICANT CHANGE UP
MONOCYTES NFR BLD AUTO: 3 % — SIGNIFICANT CHANGE UP (ref 2–14)
NEUTROPHILS NFR BLD AUTO: 81 % — HIGH (ref 43–77)
ORGANISM # SPEC MICROSCOPIC CNT: SIGNIFICANT CHANGE UP
ORGANISM # SPEC MICROSCOPIC CNT: SIGNIFICANT CHANGE UP
PHOSPHATE SERPL-MCNC: 2.8 MG/DL — SIGNIFICANT CHANGE UP (ref 2.5–4.5)
PLATELET # BLD AUTO: 183 K/UL — SIGNIFICANT CHANGE UP (ref 150–400)
POTASSIUM SERPL-MCNC: 4.4 MMOL/L — SIGNIFICANT CHANGE UP (ref 3.5–5.3)
POTASSIUM SERPL-MCNC: 4.8 MMOL/L — SIGNIFICANT CHANGE UP (ref 3.5–5.3)
POTASSIUM SERPL-SCNC: 4.4 MMOL/L — SIGNIFICANT CHANGE UP (ref 3.5–5.3)
POTASSIUM SERPL-SCNC: 4.8 MMOL/L — SIGNIFICANT CHANGE UP (ref 3.5–5.3)
RBC # BLD: 2.78 M/UL — LOW (ref 4.2–5.8)
RBC # FLD: 18.1 % — HIGH (ref 10.3–16.9)
SODIUM SERPL-SCNC: 137 MMOL/L — SIGNIFICANT CHANGE UP (ref 135–145)
SODIUM SERPL-SCNC: 139 MMOL/L — SIGNIFICANT CHANGE UP (ref 135–145)
SPECIMEN SOURCE: SIGNIFICANT CHANGE UP
WBC # BLD: 9.9 K/UL — SIGNIFICANT CHANGE UP (ref 3.8–10.5)
WBC # FLD AUTO: 9.9 K/UL — SIGNIFICANT CHANGE UP (ref 3.8–10.5)

## 2018-04-13 PROCEDURE — 71045 X-RAY EXAM CHEST 1 VIEW: CPT | Mod: 26

## 2018-04-13 PROCEDURE — 99233 SBSQ HOSP IP/OBS HIGH 50: CPT | Mod: GC

## 2018-04-13 RX ORDER — CEFTRIAXONE 500 MG/1
1 INJECTION, POWDER, FOR SOLUTION INTRAMUSCULAR; INTRAVENOUS EVERY 24 HOURS
Qty: 0 | Refills: 0 | Status: DISCONTINUED | OUTPATIENT
Start: 2018-04-13 | End: 2018-04-13

## 2018-04-13 RX ORDER — PIPERACILLIN AND TAZOBACTAM 4; .5 G/20ML; G/20ML
2.25 INJECTION, POWDER, LYOPHILIZED, FOR SOLUTION INTRAVENOUS EVERY 8 HOURS
Qty: 0 | Refills: 0 | Status: DISCONTINUED | OUTPATIENT
Start: 2018-04-13 | End: 2018-04-13

## 2018-04-13 RX ORDER — NOREPINEPHRINE BITARTRATE/D5W 8 MG/250ML
0.01 PLASTIC BAG, INJECTION (ML) INTRAVENOUS
Qty: 8 | Refills: 0 | Status: DISCONTINUED | OUTPATIENT
Start: 2018-04-13 | End: 2018-04-18

## 2018-04-13 RX ORDER — LOPERAMIDE HCL 2 MG
2 TABLET ORAL ONCE
Qty: 0 | Refills: 0 | Status: DISCONTINUED | OUTPATIENT
Start: 2018-04-13 | End: 2018-04-13

## 2018-04-13 RX ORDER — VANCOMYCIN HCL 1 G
1250 VIAL (EA) INTRAVENOUS ONCE
Qty: 0 | Refills: 0 | Status: COMPLETED | OUTPATIENT
Start: 2018-04-13 | End: 2018-04-13

## 2018-04-13 RX ORDER — MEROPENEM 1 G/30ML
500 INJECTION INTRAVENOUS EVERY 12 HOURS
Qty: 0 | Refills: 0 | Status: COMPLETED | OUTPATIENT
Start: 2018-04-13 | End: 2018-04-14

## 2018-04-13 RX ORDER — DEXTROSE 50 % IN WATER 50 %
12.5 SYRINGE (ML) INTRAVENOUS ONCE
Qty: 0 | Refills: 0 | Status: COMPLETED | OUTPATIENT
Start: 2018-04-13 | End: 2018-04-13

## 2018-04-13 RX ORDER — PIPERACILLIN AND TAZOBACTAM 4; .5 G/20ML; G/20ML
2.25 INJECTION, POWDER, LYOPHILIZED, FOR SOLUTION INTRAVENOUS EVERY 6 HOURS
Qty: 0 | Refills: 0 | Status: DISCONTINUED | OUTPATIENT
Start: 2018-04-13 | End: 2018-04-13

## 2018-04-13 RX ORDER — MEROPENEM 1 G/30ML
INJECTION INTRAVENOUS
Qty: 0 | Refills: 0 | Status: DISCONTINUED | OUTPATIENT
Start: 2018-04-13 | End: 2018-04-13

## 2018-04-13 RX ORDER — MEROPENEM 1 G/30ML
500 INJECTION INTRAVENOUS EVERY 12 HOURS
Qty: 0 | Refills: 0 | Status: DISCONTINUED | OUTPATIENT
Start: 2018-04-14 | End: 2018-04-14

## 2018-04-13 RX ORDER — VANCOMYCIN HCL 1 G
1000 VIAL (EA) INTRAVENOUS ONCE
Qty: 0 | Refills: 0 | Status: DISCONTINUED | OUTPATIENT
Start: 2018-04-13 | End: 2018-04-13

## 2018-04-13 RX ADMIN — Medication 650 MILLIGRAM(S): at 05:30

## 2018-04-13 RX ADMIN — INSULIN HUMAN 7 UNIT(S): 100 INJECTION, SOLUTION SUBCUTANEOUS at 17:53

## 2018-04-13 RX ADMIN — MODAFINIL 100 MILLIGRAM(S): 200 TABLET ORAL at 06:32

## 2018-04-13 RX ADMIN — CEFTRIAXONE 100 GRAM(S): 500 INJECTION, POWDER, FOR SOLUTION INTRAMUSCULAR; INTRAVENOUS at 17:53

## 2018-04-13 RX ADMIN — Medication 1 MILLIGRAM(S): at 12:08

## 2018-04-13 RX ADMIN — Medication 166.67 MILLIGRAM(S): at 09:12

## 2018-04-13 RX ADMIN — PANTOPRAZOLE SODIUM 40 MILLIGRAM(S): 20 TABLET, DELAYED RELEASE ORAL at 12:08

## 2018-04-13 RX ADMIN — Medication 12.5 MILLILITER(S): at 00:45

## 2018-04-13 RX ADMIN — PIPERACILLIN AND TAZOBACTAM 200 GRAM(S): 4; .5 INJECTION, POWDER, LYOPHILIZED, FOR SOLUTION INTRAVENOUS at 09:12

## 2018-04-13 RX ADMIN — Medication 1 TABLET(S): at 12:08

## 2018-04-13 RX ADMIN — MIDODRINE HYDROCHLORIDE 15 MILLIGRAM(S): 2.5 TABLET ORAL at 19:49

## 2018-04-13 RX ADMIN — CHLORHEXIDINE GLUCONATE 15 MILLILITER(S): 213 SOLUTION TOPICAL at 12:11

## 2018-04-13 RX ADMIN — MIDODRINE HYDROCHLORIDE 15 MILLIGRAM(S): 2.5 TABLET ORAL at 12:08

## 2018-04-13 RX ADMIN — MEROPENEM 100 MILLIGRAM(S): 1 INJECTION INTRAVENOUS at 20:20

## 2018-04-13 RX ADMIN — Medication 100 MILLIGRAM(S): at 12:08

## 2018-04-13 RX ADMIN — CHLORHEXIDINE GLUCONATE 1 APPLICATION(S): 213 SOLUTION TOPICAL at 05:33

## 2018-04-13 RX ADMIN — Medication 650 MILLIGRAM(S): at 05:00

## 2018-04-13 RX ADMIN — MIDODRINE HYDROCHLORIDE 15 MILLIGRAM(S): 2.5 TABLET ORAL at 03:20

## 2018-04-13 RX ADMIN — MODAFINIL 100 MILLIGRAM(S): 200 TABLET ORAL at 13:00

## 2018-04-13 RX ADMIN — Medication 25 MILLIGRAM(S): at 03:19

## 2018-04-13 RX ADMIN — ERGOCALCIFEROL 6000 UNIT(S): 1.25 CAPSULE ORAL at 12:08

## 2018-04-13 RX ADMIN — FLUCONAZOLE 100 MILLIGRAM(S): 150 TABLET ORAL at 23:49

## 2018-04-13 RX ADMIN — ESCITALOPRAM OXALATE 5 MILLIGRAM(S): 10 TABLET, FILM COATED ORAL at 12:08

## 2018-04-13 RX ADMIN — CHLORHEXIDINE GLUCONATE 15 MILLILITER(S): 213 SOLUTION TOPICAL at 23:48

## 2018-04-13 NOTE — PROGRESS NOTE ADULT - SUBJECTIVE AND OBJECTIVE BOX
INTERVAL HPI/OVERNIGHT EVENTS:    SUBJECTIVE: Patient seen and examined at bedside.    OBJECTIVE:    VITAL SIGNS:  ICU Vital Signs Last 24 Hrs  T(C): 37 (13 Apr 2018 01:54), Max: 37.1 (12 Apr 2018 17:00)  T(F): 98.6 (13 Apr 2018 01:54), Max: 98.8 (12 Apr 2018 17:00)  HR: 100 (13 Apr 2018 06:00) (82 - 116)  BP: 82/60 (13 Apr 2018 06:00) (67/52 - 118/84)  BP(mean): 69 (13 Apr 2018 06:00) (57 - 114)  ABP: --  ABP(mean): --  RR: 21 (13 Apr 2018 06:00) (7 - 28)  SpO2: 100% (13 Apr 2018 06:00) (90% - 100%)    Mode: CPAP with PS, FiO2: 40, PS: 8, MAP: 9.3, PIP: 13    04-11 @ 07:01  - 04-12 @ 07:00  --------------------------------------------------------  IN: 1181 mL / OUT: 550 mL / NET: 631 mL    04-12 @ 07:01  - 04-13 @ 06:23  --------------------------------------------------------  IN: 770 mL / OUT: 1400 mL / NET: -630 mL      CAPILLARY BLOOD GLUCOSE      POCT Blood Glucose.: 102 mg/dL (13 Apr 2018 06:12)      PHYSICAL EXAM:    General: NAD  HEENT: NC/AT; PERRL, clear conjunctiva  Neck: supple  Respiratory: CTA b/l  Cardiovascular: +S1/S2; RRR  Abdomen: soft, NT/ND; +BS x4  Extremities: WWP, 2+ peripheral pulses b/l; no LE edema  Skin: normal color and turgor; no rash  Neurological:     MEDICATIONS:  MEDICATIONS  (STANDING):  chlorhexidine 0.12% Liquid 15 milliLiter(s) Swish and Spit two times a day  chlorhexidine 2% Cloths 1 Application(s) Topical daily  dextrose 5%. 1000 milliLiter(s) (50 mL/Hr) IV Continuous <Continuous>  dextrose 50% Injectable 12.5 Gram(s) IV Push once  dextrose 50% Injectable 25 Gram(s) IV Push once  dextrose 50% Injectable 25 Gram(s) IV Push once  ergocalciferol Drops 6000 Unit(s) Oral daily  escitalopram 5 milliGRAM(s) Oral daily  fluconAZOLE IVPB 200 milliGRAM(s) IV Intermittent every 24 hours  folic acid 1 milliGRAM(s) Oral daily  hydrocortisone sodium succinate Injectable 25 milliGRAM(s) IV Push every 24 hours  insulin glargine Injectable (LANTUS) 12 Unit(s) SubCutaneous at bedtime  insulin regular  human corrective regimen sliding scale   SubCutaneous every 6 hours  insulin regular  human recombinant 7 Unit(s) SubCutaneous every 6 hours  loperamide 2 milliGRAM(s) Oral once  midodrine 15 milliGRAM(s) Oral every 8 hours  modafinil 100 milliGRAM(s) Oral <User Schedule>  multivitamin 1 Tablet(s) Oral daily  norepinephrine Infusion 0.01 MICROgram(s)/kG/Min (1.5 mL/Hr) IV Continuous <Continuous>  pantoprazole   Suspension 40 milliGRAM(s) Oral daily  thiamine 100 milliGRAM(s) Oral daily    MEDICATIONS  (PRN):  acetaminophen    Suspension. 650 milliGRAM(s) Oral every 6 hours PRN Moderate Pain (4 - 6)  dextrose Gel 1 Dose(s) Oral once PRN Blood Glucose LESS THAN 70 milliGRAM(s)/deciliter  glucagon  Injectable 1 milliGRAM(s) IntraMuscular once PRN Glucose LESS THAN 70 milligrams/deciliter      ALLERGIES:  Allergies    No Known Allergies    Intolerances        LABS:                        8.2    9.9   )-----------( 183      ( 13 Apr 2018 04:20 )             26.7     04-13    139  |  96  |  86<H>  ----------------------------<  83  4.4   |  22  |  4.38<H>    Ca    9.2      13 Apr 2018 04:20  Phos  2.8     04-13  Mg     2.0     04-13    TPro  6.4  /  Alb  3.1<L>  /  TBili  0.9  /  DBili  x   /  AST  20  /  ALT  32  /  AlkPhos  171<H>  04-12          RADIOLOGY & ADDITIONAL TESTS: Reviewed. INTERVAL HPI/OVERNIGHT EVENTS: FSG in 70s, given 1/2 amp D50. MAPs decreased to 50s, restarted levophed. Spiked temperature in AAM to 102.7, blood cx, fungal cx, sputum culture sent.     SUBJECTIVE: Patient seen and examined at bedside. Awake and uncomfortable appearing. Nodding no to all questions, denies pain. Non-verbal.     OBJECTIVE:    VITAL SIGNS:  ICU Vital Signs Last 24 Hrs  T(C): 37 (13 Apr 2018 01:54), Max: 37.1 (12 Apr 2018 17:00)  T(F): 98.6 (13 Apr 2018 01:54), Max: 98.8 (12 Apr 2018 17:00)  HR: 100 (13 Apr 2018 06:00) (82 - 116)  BP: 82/60 (13 Apr 2018 06:00) (67/52 - 118/84)  BP(mean): 69 (13 Apr 2018 06:00) (57 - 114)  ABP: --  ABP(mean): --  RR: 21 (13 Apr 2018 06:00) (7 - 28)  SpO2: 100% (13 Apr 2018 06:00) (90% - 100%)    Mode: CPAP with PS, FiO2: 40, PS: 8, MAP: 9.3, PIP: 13    04-11 @ 07:01  -  04-12 @ 07:00  --------------------------------------------------------  IN: 1181 mL / OUT: 550 mL / NET: 631 mL    04-12 @ 07:01 - 04-13 @ 06:23  --------------------------------------------------------  IN: 770 mL / OUT: 1400 mL / NET: -630 mL      CAPILLARY BLOOD GLUCOSE      POCT Blood Glucose.: 102 mg/dL (13 Apr 2018 06:12)      PHYSICAL EXAM:    General: Awake, uncomfortable appearing. Responding to questions intermittently with head nods. NAD.   HEENT: NC/AT; pupils equal, clear conjunctiva  Neck: +Trach in place, w/ small amount of yellowish secretions  Respiratory: Bilateral rhonchi, no wheezing. Non-labored breathing on ventilator.   Cardiovascular: +S1/S2; RRR, no m/r/g  Abdomen: soft, NT/ND; +BS  Extremities: WWP, 2+ peripheral pulses b/l; 2+ LE dependent edema  Skin: normal color and turgor; no rash  Neurological:     MEDICATIONS:  MEDICATIONS  (STANDING):  chlorhexidine 0.12% Liquid 15 milliLiter(s) Swish and Spit two times a day  chlorhexidine 2% Cloths 1 Application(s) Topical daily  dextrose 5%. 1000 milliLiter(s) (50 mL/Hr) IV Continuous <Continuous>  dextrose 50% Injectable 12.5 Gram(s) IV Push once  dextrose 50% Injectable 25 Gram(s) IV Push once  dextrose 50% Injectable 25 Gram(s) IV Push once  ergocalciferol Drops 6000 Unit(s) Oral daily  escitalopram 5 milliGRAM(s) Oral daily  fluconAZOLE IVPB 200 milliGRAM(s) IV Intermittent every 24 hours  folic acid 1 milliGRAM(s) Oral daily  hydrocortisone sodium succinate Injectable 25 milliGRAM(s) IV Push every 24 hours  insulin glargine Injectable (LANTUS) 12 Unit(s) SubCutaneous at bedtime  insulin regular  human corrective regimen sliding scale   SubCutaneous every 6 hours  insulin regular  human recombinant 7 Unit(s) SubCutaneous every 6 hours  loperamide 2 milliGRAM(s) Oral once  midodrine 15 milliGRAM(s) Oral every 8 hours  modafinil 100 milliGRAM(s) Oral <User Schedule>  multivitamin 1 Tablet(s) Oral daily  norepinephrine Infusion 0.01 MICROgram(s)/kG/Min (1.5 mL/Hr) IV Continuous <Continuous>  pantoprazole   Suspension 40 milliGRAM(s) Oral daily  thiamine 100 milliGRAM(s) Oral daily    MEDICATIONS  (PRN):  acetaminophen    Suspension. 650 milliGRAM(s) Oral every 6 hours PRN Moderate Pain (4 - 6)  dextrose Gel 1 Dose(s) Oral once PRN Blood Glucose LESS THAN 70 milliGRAM(s)/deciliter  glucagon  Injectable 1 milliGRAM(s) IntraMuscular once PRN Glucose LESS THAN 70 milligrams/deciliter      ALLERGIES:  Allergies    No Known Allergies    Intolerances        LABS:                        8.2    9.9   )-----------( 183      ( 13 Apr 2018 04:20 )             26.7     04-13    139  |  96  |  86<H>  ----------------------------<  83  4.4   |  22  |  4.38<H>    Ca    9.2      13 Apr 2018 04:20  Phos  2.8     04-13  Mg     2.0     04-13    TPro  6.4  /  Alb  3.1<L>  /  TBili  0.9  /  DBili  x   /  AST  20  /  ALT  32  /  AlkPhos  171<H>  04-12          RADIOLOGY & ADDITIONAL TESTS: Reviewed. INTERVAL HPI/OVERNIGHT EVENTS: FSG in 70s, given 1/2 amp D50. MAPs decreased to 50s, restarted levophed. Spiked temperature in AAM to 102.7, blood cx, fungal cx, sputum culture sent.     SUBJECTIVE: Patient seen and examined at bedside. Awake and uncomfortable appearing. Nodding no to all questions, denies pain. Non-verbal.     OBJECTIVE:    VITAL SIGNS:  ICU Vital Signs Last 24 Hrs  T(C): 37 (13 Apr 2018 01:54), Max: 37.1 (12 Apr 2018 17:00)  T(F): 98.6 (13 Apr 2018 01:54), Max: 98.8 (12 Apr 2018 17:00)  HR: 100 (13 Apr 2018 06:00) (82 - 116)  BP: 82/60 (13 Apr 2018 06:00) (67/52 - 118/84)  BP(mean): 69 (13 Apr 2018 06:00) (57 - 114)  ABP: --  ABP(mean): --  RR: 21 (13 Apr 2018 06:00) (7 - 28)  SpO2: 100% (13 Apr 2018 06:00) (90% - 100%)    Mode: CPAP with PS, FiO2: 40, PS: 8, MAP: 9.3, PIP: 13    04-11 @ 07:01  -  04-12 @ 07:00  --------------------------------------------------------  IN: 1181 mL / OUT: 550 mL / NET: 631 mL    04-12 @ 07:01 - 04-13 @ 06:23  --------------------------------------------------------  IN: 770 mL / OUT: 1400 mL / NET: -630 mL      CAPILLARY BLOOD GLUCOSE    POCT Blood Glucose.: 102 mg/dL (13 Apr 2018 06:12)      PHYSICAL EXAM:    General: Awake, uncomfortable appearing. Responding to questions intermittently with head nods. NAD.   HEENT: NC/AT; pupils equal, clear conjunctiva  Neck: +Trach in place, w/ small amount of yellowish secretions  Respiratory: Bilateral rhonchi, no wheezing. Non-labored breathing on ventilator.   Cardiovascular: +S1/S2; RRR, no m/r/g  Abdomen: soft, NT/ND; +BS  Extremities: WWP, 2+ peripheral pulses b/l; 2+ LE dependent edema  Skin: normal color and turgor; no rash  Neurological: Awake, able to nod to questions. Movement of RUE spontaneously No movement of LUE, b/l LE. Does not withdraw to pain in L arm. Grimaces to pain in R arm. All extremities rigid.     MEDICATIONS:  MEDICATIONS  (STANDING):  chlorhexidine 0.12% Liquid 15 milliLiter(s) Swish and Spit two times a day  chlorhexidine 2% Cloths 1 Application(s) Topical daily  dextrose 5%. 1000 milliLiter(s) (50 mL/Hr) IV Continuous <Continuous>  dextrose 50% Injectable 12.5 Gram(s) IV Push once  dextrose 50% Injectable 25 Gram(s) IV Push once  dextrose 50% Injectable 25 Gram(s) IV Push once  ergocalciferol Drops 6000 Unit(s) Oral daily  escitalopram 5 milliGRAM(s) Oral daily  fluconAZOLE IVPB 200 milliGRAM(s) IV Intermittent every 24 hours  folic acid 1 milliGRAM(s) Oral daily  hydrocortisone sodium succinate Injectable 25 milliGRAM(s) IV Push every 24 hours  insulin glargine Injectable (LANTUS) 12 Unit(s) SubCutaneous at bedtime  insulin regular  human corrective regimen sliding scale   SubCutaneous every 6 hours  insulin regular  human recombinant 7 Unit(s) SubCutaneous every 6 hours  loperamide 2 milliGRAM(s) Oral once  midodrine 15 milliGRAM(s) Oral every 8 hours  modafinil 100 milliGRAM(s) Oral <User Schedule>  multivitamin 1 Tablet(s) Oral daily  norepinephrine Infusion 0.01 MICROgram(s)/kG/Min (1.5 mL/Hr) IV Continuous <Continuous>  pantoprazole   Suspension 40 milliGRAM(s) Oral daily  thiamine 100 milliGRAM(s) Oral daily    MEDICATIONS  (PRN):  acetaminophen    Suspension. 650 milliGRAM(s) Oral every 6 hours PRN Moderate Pain (4 - 6)  dextrose Gel 1 Dose(s) Oral once PRN Blood Glucose LESS THAN 70 milliGRAM(s)/deciliter  glucagon  Injectable 1 milliGRAM(s) IntraMuscular once PRN Glucose LESS THAN 70 milligrams/deciliter      ALLERGIES:  Allergies    No Known Allergies    Intolerances        LABS:                        8.2    9.9   )-----------( 183      ( 13 Apr 2018 04:20 )             26.7     04-13    139  |  96  |  86<H>  ----------------------------<  83  4.4   |  22  |  4.38<H>    Ca    9.2      13 Apr 2018 04:20  Phos  2.8     04-13  Mg     2.0     04-13    TPro  6.4  /  Alb  3.1<L>  /  TBili  0.9  /  DBili  x   /  AST  20  /  ALT  32  /  AlkPhos  171<H>  04-12        RADIOLOGY & ADDITIONAL TESTS: Reviewed.

## 2018-04-13 NOTE — PROGRESS NOTE ADULT - SUBJECTIVE AND OBJECTIVE BOX
Patient seen and examined at bedside. Patient is a 63 year old male with a history of HFrEF 10-15% due to ischemic cardiomyopathy, s/p AICD, ?atrial fibrillation, hypertension, diabetes mellitus type 2, gout, and CKD for whom nephrology was called for GILMER from ATN requiring hemodialysis.  Patient was dialyzed 4/12 with UF of 1 kg. Patient appears more alert today then usual.     acetaminophen    Suspension. 650 milliGRAM(s) every 6 hours PRN  cefTRIAXone   IVPB 1 Gram(s) every 24 hours  chlorhexidine 0.12% Liquid 15 milliLiter(s) two times a day  chlorhexidine 2% Cloths 1 Application(s) daily  dextrose 5%. 1000 milliLiter(s) <Continuous>  dextrose 50% Injectable 12.5 Gram(s) once  dextrose 50% Injectable 25 Gram(s) once  dextrose 50% Injectable 25 Gram(s) once  dextrose Gel 1 Dose(s) once PRN  ergocalciferol Drops 6000 Unit(s) daily  escitalopram 5 milliGRAM(s) daily  fluconAZOLE IVPB 200 milliGRAM(s) every 24 hours  folic acid 1 milliGRAM(s) daily  glucagon  Injectable 1 milliGRAM(s) once PRN  hydrocortisone sodium succinate Injectable 25 milliGRAM(s) every 24 hours  insulin glargine Injectable (LANTUS) 12 Unit(s) at bedtime  insulin regular  human corrective regimen sliding scale   every 6 hours  insulin regular  human recombinant 7 Unit(s) every 6 hours  midodrine 15 milliGRAM(s) every 8 hours  modafinil 100 milliGRAM(s) <User Schedule>  multivitamin 1 Tablet(s) daily  norepinephrine Infusion 0.01 MICROgram(s)/kG/Min <Continuous>  pantoprazole   Suspension 40 milliGRAM(s) daily  thiamine 100 milliGRAM(s) daily    Allergies    No Known Allergies    Intolerances    T(C): , Max: 38.4 (04-13-18 @ 06:20)  T(F): , Max: 101.1 (04-13-18 @ 06:20)  HR: 94 (04-13-18 @ 10:00)  BP: 87/67 (04-13-18 @ 10:00)  BP(mean): 72 (04-13-18 @ 10:00)  RR: 23 (04-13-18 @ 10:00)  SpO2: 100% (04-13-18 @ 10:00)    04-12 @ 07:01  -  04-13 @ 07:00  --------------------------------------------------------  IN:    Albumin 25%: 150 mL    norepinephrine Infusion: 25 mL    norepinephrine Infusion: 25 mL    Solution: 100 mL    TwoCal HN: 520 mL  Total IN: 820 mL    OUT:    Other: 1150 mL    Rectal Tube: 250 mL  Total OUT: 1400 mL    Total NET: -580 mL    04-13 @ 07:01  -  04-13 @ 11:01  --------------------------------------------------------  IN:    norepinephrine Infusion: 24 mL    Solution: 350 mL    TwoCal HN: 200 mL  Total IN: 574 mL    OUT:  Total OUT: 0 mL    Total NET: 574 mL    LABS:                        8.2    9.9   )-----------( 183      ( 13 Apr 2018 04:20 )             26.7     04-13    137  |  94<L>  |  85<H>  ----------------------------<  151<H>  4.8   |  23  |  4.46<H>    Ca    8.8      13 Apr 2018 09:09  Phos  2.8     04-13  Mg     2.0     04-13    TPro  6.4  /  Alb  3.1<L>  /  TBili  0.9  /  DBili  x   /  AST  20  /  ALT  32  /  AlkPhos  171<H>  04-12

## 2018-04-13 NOTE — PROGRESS NOTE ADULT - PROBLEM SELECTOR PLAN 1
Patient is a 63  year old male with acute on chronic renal failure from ischemic ATN. Patient is currently requiring hemodialysis. Patient was last dialyzed 4/12 with UF of 1kg. Patient underwent permcath placement on 4/12     P - No need for emergent dialysis today as electrolytes are acceptable  will reevaluate for dialysis tomorrow

## 2018-04-13 NOTE — PROGRESS NOTE ADULT - ASSESSMENT
62 yo male with ICM s/p ICD, prolonged hospitalization for LLE cellulitis, with course c/b ATN requiring initiation of HD, now with new fever and candidemia. The most likely source is the Permacath (which was removed 4/8). RUKHSANA with no evidence of IE or infected ICD leads. Evaluated by [facility] - no candidal endophthalmitis, will follow. On 4/10, Found to have left sided hemiplegia. C. tropicalis isolate returned as fluconazole susceptible    Recommend:   1. Continue fluconazole 200mg IV q24h.  2. Can discontinue vancomycin and continue with zosyn at this time.  3. Follow up blood cultures negative to date.    ID will follow. 62 yo male with ICM s/p ICD, prolonged hospitalization for LLE cellulitis, with course c/b ATN requiring initiation of HD, now with new fever and candidemia. The most likely source is the Permacath (which was removed 4/8). RUKHSANA with no evidence of IE or infected ICD leads. Evaluated by [facility]ht[facility] - no candidal endophthalmitis, will follow. On 4/10, Found to have left sided hemiplegia. C. tropicalis isolate returned as fluconazole susceptible    Recommend:   1. Continue fluconazole 200mg IV q24h.  2. Can discontinue vancomycin and Zosyn; start ceftriaxone 1g IV q24h for Klebsiella UTI  3. Follow up blood cultures negative to date.    ID will follow.

## 2018-04-13 NOTE — PROGRESS NOTE ADULT - ASSESSMENT
63M PMH HFrEF 10-15% (ischemic), MI, s/p AICD vs PPM, possible Afib, HTN, DM2 on insulin, possible CKD, and gout, who presented with a chief complaint of generalized weakness s/p septic shock likely 2/2 LE cellulitis. Now with AMS likely from brainstem infarct and/or toxic metabolic encephalopathy, now with candidemia.    NEURO  #AMS  - Pt non responsive since 3/22. Vital signs normal during the event. Pt intubated for airway protection. Stroke code called. CT, CTA negative. VEEG showed moderate slowing but no seizure activity. Patients mental status worsening/ not improving likely 2/2 to underlying infection. No spontaneous movement of LUE.   -Repeat CT head 4/11 performed due to concern for not moving LUE, however only showing only chronic infarctions.   - MRI (from 3/26) read addendum showing  C3/C4 level there is a an approximately 1.5 cm long extrusion coursing superiorly to the C2/C3 contacting the ventral spinal cord- Per neurology, this may be contributing to weakness, however would not explain change in mental status.   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 Per neuro, event could have resulted from hypoperfusion.   - C/w Modafinil.   - Unclear neuro prognosis at this point.   - TTE with Definity shows no clot at present  - S/p PEG/trach on 4/4     ID  #Candidemia with candida tropicalis: +SIRS- Bandemia  and fever. Blood cultures positive for candida tropicalis on 4/7. Source unclear at this time.   -CTAP 4/11- Negative for abscess  -Urine culture growing GNR  -Surveillance fungal cultures 4/10 NGTD, however not finalized.   -Surveillance cx 4/9 again w/ candida  -C/w Micafungin 100mg q 24hrs  -RUKHSANA negative for vegetations/ infection of AICD  -Tunelled HD catheter removed 4/8, temp HD placed 4/10  - ID consulted, appreciate recommendations      #Septic shock 2/2 Cellulitis- Resolved  - C/w midodrine to 15 q8   - Decreased Solucortef 25mg q 24hrs given hyperglycemia/ infection, patient on for hypotension borderline ACTH test  - S/p zosyn for suspected UTI(3/25-3/26)  s/p Zosyn (3/11-2/21). Was previously also on Vanco (3/11-3/17).   - RUQ doppler shows no portal vein thrombosis. given worsening transaminitis and increased residuals RUQ sono was obtained which only shows cholelithiasis.   - TTE with no vegetations. Pt not stable for RUKHSANA.   - Gallium scan showed LLE cellulitis.     - repeat LE CT does not show abscess or gas  - HIV negative  - Vascular surgery on board. Recommending Amputation as an option if pt continues to be unstable.     #UTI- Urine cloudy appearing with increase WBC.- Resolved  - S/p zosyn 3/25-3/26    CARDIOVASCULAR   # HYPOTENSION-   - c/w Midodrine 15 mg q8h to maintain SBP>65  - Solumedrol decreased as above   - Levophed titrated off, will restart if needed during HD to maintain MAP >65  - CHF with severely reduced EF 15%, caution with fluids. Per renal recs, can give IV albumin for fluids.    # CHF exacerbation- CHF with EF 10-15% per pt 2/2 ischemic cardiomyopathy s/p AICD. Elevated BNP on admission with R sided effusion and edema  - persistent pulmonary vascular congestion on CXR with layering pleural effusions.   - Maintain negative fluid balance with dialysis, watching BP  - Unclear medical regimen opt. Per [facility] pharmacy ( and [place]) pt has not picked up Torsemide 20 or Metoprolol 100 since November.   - Hold ACE/Metoprolol in setting of sepsis/ hypotension    #AFIB- hx of Afib and LV thrombus on coumadin.    - Holding Metoprolol 12.5 q12h given hypotension. Will use Dig for rate control if RVR   - Holding coumadin given thrombocytopenia, will consider starting when plts stable/ uptrending  - S/p heparin gtt, discontinued 2/2 thrombocytopenia concern for HIT.  - TTE with affinity shows no  LV thrombus currently    #CAD- Hx of MI s/p AICD  - Pt with pLAD in 7/2017, was started on Aspirin and Brilinta per records.  - Holding asp 81, coumadin and atorvastatin (discontinued 2/2 LFT uptrend, thrombocytopenia).     #LE Edema   - LE edema with chronic venous stasis.   - Duplex does not show DVT    PULMONARY   #Acute Resp failure- S/p tracheostomy 4/5. Back on vent overnight with CPAP trial this morning. CXR today with worsened opacities on L and R. Bedside US with worsened simple b/l pleural effusions and atelectatic lung. Less concern for infectious source given b/l effusions with no septations/loculations. Likely related to fluid overload. If clinically worsening, can consider thoracentesis for fluid studies and cultures.  -Continue CPAP trials, weaning to trach collar as tolerated.       RENAL   #ARF- likely ischemic ATN in setting of sepsis with low intravascular volume. Unknown baseline Cr presenting with Cr 3.93 with metabolic derangements. Renal team on board, pt with R HD catheter placed by IR on 3/21. Now on intermittent HD. HD catheter removed 4/8 due to fungemia, temporary HD catheter placed 4/10.    -Last dialyzed 4/10, planned for dialysis today  -Replace permacath after dialysis, cultures clear x 48hrs, approved by ID  - c/w intermittent HD per renal recs   - retroperitoneal ultrasound showing medical renal disease.     #Hyperkalemia- PT with elevated K to 5.7 on admission,  no EKG changes.  - Continue telemetry monitoring  - Trend K   - c/w dialysis   - Kayexalate PRN    #Metabolic acidosis   - 2/2 Lactate, starvation ketoacidosis and uremia   - resolved       GI   # transaminitis and elevated bili- Patient with transaminitis that has remained stable, however with new elevation in Bili to 2.1 today. Prior RUQ US with cholelithiasis and prior CT abdomen/pelvis consistent with cholelithiasis and ascites.   - Repeat RUQ US negative fo cholecysitis, rec HIDA if strong clinical suspicion. However patient is now found to be fungemic.     HEME  #Thrombocytopenia- ELVIRA negative but PF4 was positive. Unlikely HIT. Most likely 2/2 zosyn or sepsis   - Continue to hold coumadin and Aspirin  - Monitor for signs of bleeding   - transfuse if <10 or <50 bleeding    #Elevated INR. Unclear etiology.   - Holding coumadin given thrombocytopenia  - Given Vit K and FFP3/12. FFP 3/13, FFP 4/4  - Monitor coags    #Anemia- Likely 2/2 phlebotomy and CKD, no signs of bleeding. Prior studies showed Anemia of chronic disease.  -Continue to monitor  -s/p 1pRBC on 3/12, 3/16 and 4/4    ENDOCRINE   - Lantus 12U and start humalin 7units TID   - MISS  - monitor FSG and adjust as needed   - A1C 8.6%      F: No IVF   E: Replete K<4 Mg<2, caution in setting of acute renal failure  N: PEG Placed 4/4, 1.5 Glucerna      Lines:  Peripheral, R inguinal temp HD catheter (4/10)  Drips: None  Full code  Dispo: MICU  Ppx: PPI, no heparin/ coumadin given thrombocytopenia 63M PMH HFrEF 10-15% (ischemic), MI, s/p AICD vs PPM, possible Afib, HTN, DM2 on insulin, possible CKD, and gout, who presented with a chief complaint of generalized weakness s/p septic shock likely 2/2 LE cellulitis. Now with AMS likely from brainstem infarct and/or toxic metabolic encephalopathy, now with candidemia    NEURO  #AMS  - Pt non responsive since 3/22. Vital signs normal during the event. Pt intubated for airway protection. Stroke code called. CT, CTA negative. VEEG showed moderate slowing but no seizure activity. Patients mental status worsening/ not improving likely 2/2 to underlying infection. No spontaneous movement of LUE. Neuro exam unchanged.   -Repeat CT head 4/11 performed due to concern for not moving LUE, however only showing only chronic infarctions.   - MRI (from 3/26) read addendum showing  C3/C4 level there is a an approximately 1.5 cm long extrusion coursing superiorly to the C2/C3 contacting the ventral spinal cord- Per neurology, this may be contributing to weakness, however would not explain change in mental status.   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 Per neuro, event could have resulted from hypoperfusion.   - C/w Modafinil.   - Unclear neuro prognosis at this point.   - TTE with Definity shows no clot at present  - S/p PEG/trach on 4/4     ID  #Candidemia with candida tropicalis: +SIRS- Bandemia  and fever. Blood cultures positive for candida tropicalis on 4/7. Source unclear at this time.   -S/p Micafungin 100mg q 24hrs (4/7-4/12)  -Fluconazole 200mg q 24hrs (4/13--)  -CTAP 4/11- Negative for abscess  -Surveillance fungal cultures 4/10 NGTD, however not finalized.   -Surveillance cx 4/9 again w/ candida  -RUKHSANA negative for vegetations/ infection of AICD  -Tunelled HD catheter removed 4/8, temp HD placed 4/10  - ID consulted, appreciate recommendations    Sepsis- Febrile 102.1, HR in 90s, hypotensive overnight 4/12. DDx includes persistent fungemia, although less likely given that fungal cultures have been negative. Patient also with persistent diarrhea concerning for c diff and increased yellow secretions/ rhonchi concerning for VAP. Urine culture from 4/11 growing klebsiella sensitive to ceftriaxone.   -C/w ceftriaxone 1g q 24hrs  -S/p  vanc/zosyn overnight 4/12  -F/u repeat urine cx  -F/u sputum culture  -F/u repeat blood/ fungal cultures    #Septic shock 2/2 Cellulitis- Resolved  - C/w midodrine to 15 q8   - Decreased Solucortef 25mg q 24hrs given hyperglycemia/ infection, patient on for hypotension borderline ACTH test  - S/p zosyn for suspected UTI(3/25-3/26)  s/p Zosyn (3/11-2/21). Was previously also on Vanco (3/11-3/17).   - RUQ doppler shows no portal vein thrombosis. given worsening transaminitis and increased residuals RUQ sono was obtained which only shows cholelithiasis.   - TTE with no vegetations. Pt not stable for RUKHSANA.   - Gallium scan showed LLE cellulitis.     - repeat LE CT does not show abscess or gas  - HIV negative  - Vascular surgery on board. Recommending Amputation as an option if pt continues to be unstable.     #UTI- Urine cloudy appearing with increase WBC.- Resolved  - S/p zosyn 3/25-3/26    CARDIOVASCULAR   # HYPOTENSION-   - c/w Midodrine 15 mg q8h to maintain SBP>65  - Solumedrol decreased as above   - Levophed PRN to maintain MAP >65  - CHF with severely reduced EF 15%, caution with fluids. Per renal recs, can give IV albumin for fluids.    # CHF exacerbation- CHF with EF 10-15% per pt 2/2 ischemic cardiomyopathy s/p AICD. Elevated BNP on admission with R sided effusion and edema  - persistent pulmonary vascular congestion on CXR with layering pleural effusions.   - Maintain negative fluid balance with dialysis, watching BP  - Unclear medical regimen opt. Per [facility] pharmacy ( and [place]) pt has not picked up Torsemide 20 or Metoprolol 100 since November.   - Hold ACE/Metoprolol in setting of sepsis/ hypotension    #AFIB- hx of Afib and LV thrombus on coumadin.    - Holding Metoprolol 12.5 q12h given hypotension. Will use Dig for rate control if RVR   - Holding coumadin given thrombocytopenia, will consider starting when plts stable/ uptrending  - S/p heparin gtt, discontinued 2/2 thrombocytopenia concern for HIT.  - TTE with affinity shows no  LV thrombus currently    #CAD- Hx of MI s/p AICD  - Pt with pLAD in 7/2017, was started on Aspirin and Brilinta per records.  - Holding asp 81, coumadin and atorvastatin (discontinued 2/2 LFT uptrend, thrombocytopenia).     #LE Edema   - LE edema with chronic venous stasis.   - Duplex does not show DVT    PULMONARY   #Acute Resp failure- S/p tracheostomy 4/5. Back on vent overnight with CPAP trial this morning. CXR today with worsened opacities on L and R. Bedside US with worsened simple b/l pleural effusions and atelectatic lung. Less concern for infectious source given b/l effusions with no septations/loculations. Likely related to fluid overload. If clinically worsening, can consider thoracentesis for fluid studies and cultures.  -Continue CPAP trials, weaning to trach collar as tolerated.       RENAL   #ARF- likely ischemic ATN in setting of sepsis with low intravascular volume. Unknown baseline Cr presenting with Cr 3.93 with metabolic derangements. Renal team on board, pt with R HD catheter placed by IR on 3/21. Now on intermittent HD. HD catheter removed 4/8 due to fungemia, temporary HD catheter placed 4/10.    -Last dialyzed 4/12, f/u daily renal recs, no dialysis today  -Permacath replaced 4/12 (Cultures clear x 48hrs, approved by ID)  - c/w intermittent HD per renal recs   - retroperitoneal ultrasound showing medical renal disease.     #Hyperkalemia- PT with elevated K to 5.7 on admission,  no EKG changes.  - Continue telemetry monitoring  - Trend K   - c/w dialysis   - Kayexalate PRN    #Metabolic acidosis   - 2/2 Lactate, starvation ketoacidosis and uremia   - resolved     GI   # transaminitis and elevated bili- Patient with transaminitis that has remained stable, however with new elevation in Bili to 2.1 today. Prior RUQ US with cholelithiasis and prior CT abdomen/pelvis consistent with cholelithiasis and ascites.   - Repeat RUQ US negative fo cholecysitis, rec HIDA if strong clinical suspicion.     HEME  #Thrombocytopenia- ELVIRA negative but PF4 was positive. Unlikely HIT. Most likely 2/2 zosyn or sepsis   - Continue to hold coumadin and Aspirin  - Monitor for signs of bleeding   - transfuse if <10 or <50 bleeding    #Elevated INR. Unclear etiology.   - Holding coumadin given thrombocytopenia  - Given Vit K and FFP3/12. FFP 3/13, FFP 4/4  - Monitor coags    #Anemia- Likely 2/2 phlebotomy and CKD, no signs of bleeding. Prior studies showed Anemia of chronic disease.  -Continue to monitor  -s/p 1pRBC on 3/12, 3/16 and 4/4    ENDOCRINE   - Lantus 12U and humalin 7units TID   - MISS  - monitor FSG and adjust as needed   - A1C 8.6%      F: No IVF   E: Replete K<4 Mg<2, caution in setting of acute renal failure  N: PEG Placed 4/4, 1.5 Glucerna      Lines:  Peripheral, R inguinal temp HD catheter (4/10), R dialysis permacath (4/12)  Drips: Levophed  Full code  Dispo: MICU  Ppx: PPI, no heparin/ coumadin given thrombocytopenia 63M PMH HFrEF 10-15% (ischemic), MI, s/p AICD vs PPM, possible Afib, HTN, DM2 on insulin, possible CKD, and gout, who presented with a chief complaint of generalized weakness s/p septic shock likely 2/2 LE cellulitis. Now with AMS likely from brainstem infarct and/or toxic metabolic encephalopathy, now with candidemia    NEURO  #AMS  - Pt non responsive since 3/22. Vital signs normal during the event. Pt intubated for airway protection. Stroke code called. CT, CTA negative. VEEG showed moderate slowing but no seizure activity. Patients mental status worsening/ not improving likely 2/2 to underlying infection. No spontaneous movement of LUE. Neuro exam unchanged.   -Repeat CT head 4/11 performed due to concern for not moving LUE, however only showing only chronic infarctions.   - MRI (from 3/26) read addendum showing  C3/C4 level there is a an approximately 1.5 cm long extrusion coursing superiorly to the C2/C3 contacting the ventral spinal cord- Per neurology, this may be contributing to weakness, however would not explain change in mental status.   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 Per neuro, event could have resulted from hypoperfusion.   - C/w Modafinil.   - Unclear neuro prognosis at this point.   - TTE with Definity shows no clot at present  - S/p PEG/trach on 4/4     ID  #Candidemia with candida tropicalis: +SIRS- Bandemia  and fever. Blood cultures positive for candida tropicalis on 4/7. Source unclear at this time.   -S/p Micafungin 100mg q 24hrs (4/7-4/12)  -Fluconazole 200mg q 24hrs (4/13--)  -CTAP 4/11- Negative for abscess  -Surveillance fungal cultures 4/10 NGTD, however not finalized.   -Surveillance cx 4/9 again w/ candida  -RUKHSANA negative for vegetations/ infection of AICD  -Tunelled HD catheter removed 4/8, temp HD placed 4/10  - ID consulted, appreciate recommendations    Sepsis- Febrile 102.1, HR in 90s, hypotensive overnight 4/12. DDx includes persistent fungemia, although less likely given that fungal cultures have been negative. Patient also with persistent diarrhea concerning for c diff and increased yellow secretions/ rhonchi concerning for VAP. Urine culture from 4/11 growing klebsiella sensitive to ceftriaxone.   -C/w ceftriaxone 1g q 24hrs  -S/p  vanc/zosyn overnight 4/12  -F/u repeat urine cx  -F/u sputum culture  -F/u repeat blood/ fungal cultures    #Septic shock 2/2 Cellulitis- Resolved  - C/w midodrine to 15 q8   - Decreased Solucortef 25mg q 24hrs given hyperglycemia/ infection, patient on for hypotension borderline ACTH test  - S/p zosyn for suspected UTI(3/25-3/26)  s/p Zosyn (3/11-2/21). Was previously also on Vanco (3/11-3/17).   - RUQ doppler shows no portal vein thrombosis. given worsening transaminitis and increased residuals RUQ sono was obtained which only shows cholelithiasis.   - TTE with no vegetations. Pt not stable for RUKHSANA.   - Gallium scan showed LLE cellulitis.     - repeat LE CT does not show abscess or gas  - HIV negative  - Vascular surgery on board. Recommending Amputation as an option if pt continues to be unstable.     #UTI- Urine cloudy appearing with increase WBC.- Resolved  - S/p zosyn 3/25-3/26    CARDIOVASCULAR   # HYPOTENSION-   - c/w Midodrine 15 mg q8h to maintain SBP>65  - Solumedrol decreased as above   - Levophed PRN to maintain MAP >65  - CHF with severely reduced EF 15%, caution with fluids. Per renal recs, can give IV albumin for fluids.    # CHF exacerbation- CHF with EF 10-15% per pt 2/2 ischemic cardiomyopathy s/p AICD. Elevated BNP on admission with R sided effusion and edema  - persistent pulmonary vascular congestion on CXR with layering pleural effusions.   - Maintain negative fluid balance with dialysis, watching BP  - Unclear medical regimen opt. Per [facility] pharmacy ( and [place]) pt has not picked up Torsemide 20 or Metoprolol 100 since November.   - Hold ACE/Metoprolol in setting of sepsis/ hypotension    #AFIB- hx of Afib and LV thrombus on coumadin.    - Holding Metoprolol 12.5 q12h given hypotension. Will use Dig for rate control if RVR   - Holding coumadin given thrombocytopenia, will consider starting when plts stable/ uptrending  - S/p heparin gtt, discontinued 2/2 thrombocytopenia concern for HIT.  - TTE with affinity shows no  LV thrombus currently    #CAD- Hx of MI s/p AICD  - Pt with pLAD in 7/2017, was started on Aspirin and Brilinta per records.  - Holding asp 81, coumadin and atorvastatin (discontinued 2/2 LFT uptrend, thrombocytopenia).     #LE Edema   - LE edema with chronic venous stasis.   - Duplex does not show DVT    PULMONARY   #Acute Resp failure- S/p tracheostomy 4/5. Back on vent overnight with CPAP trial this morning. CXR today with worsened opacities on L and R. Bedside US with worsened simple b/l pleural effusions and atelectatic lung. Less concern for infectious source given b/l effusions with no septations/loculations. Likely related to fluid overload. If clinically worsening, can consider thoracentesis for fluid studies and cultures.  -Continue CPAP trials, weaning to trach collar as tolerated.       RENAL   #ARF- likely ischemic ATN in setting of sepsis with low intravascular volume. Unknown baseline Cr presenting with Cr 3.93 with metabolic derangements. Renal team on board, pt with R HD catheter placed by IR on 3/21. Now on intermittent HD. HD catheter removed 4/8 due to fungemia, temporary HD catheter placed 4/10.    -Last dialyzed 4/12, f/u daily renal recs, no dialysis today  -Permacath replaced 4/12 (Cultures clear x 48hrs, approved by ID)  - c/w intermittent HD per renal recs   - retroperitoneal ultrasound showing medical renal disease.     #Elevated AG- AG 21, lactate negative, glucose has been well controlled, possibly 2/2 uremia in setting of ESRD.  -F/u repeat BMP    #Hyperkalemia- PT with elevated K to 5.7 on admission,  no EKG changes.  - Continue telemetry monitoring  - Trend K   - c/w dialysis   - Kayexalate PRN    #Metabolic acidosis   - 2/2 Lactate, starvation ketoacidosis and uremia   - resolved     GI   # transaminitis and elevated bili- Patient with transaminitis that has remained stable, however with new elevation in Bili to 2.1 today. Prior RUQ US with cholelithiasis and prior CT abdomen/pelvis consistent with cholelithiasis and ascites.   - Repeat RUQ US negative fo cholecysitis, rec HIDA if strong clinical suspicion.     HEME  #Thrombocytopenia- ELVIRA negative but PF4 was positive. Unlikely HIT. Most likely 2/2 zosyn or sepsis   - Continue to hold coumadin and Aspirin  - Monitor for signs of bleeding   - transfuse if <10 or <50 bleeding    #Elevated INR. Unclear etiology.   - Holding coumadin given thrombocytopenia  - Given Vit K and FFP3/12. FFP 3/13, FFP 4/4  - Monitor coags    #Anemia- Likely 2/2 phlebotomy and CKD, no signs of bleeding. Prior studies showed Anemia of chronic disease.  -Continue to monitor  -s/p 1pRBC on 3/12, 3/16 and 4/4    ENDOCRINE   - Lantus 12U and humalin 7units TID   - MISS  - monitor FSG and adjust as needed   - A1C 8.6%      F: No IVF   E: Replete K<4 Mg<2, caution in setting of acute renal failure  N: PEG Placed 4/4, 1.5 Glucerna      Lines:  Peripheral, R inguinal temp HD catheter (4/10), R dialysis permacath (4/12)  Drips: Levophed  Full code  Dispo: MICU  Ppx: PPI, no heparin/ coumadin given thrombocytopenia 63M PMH HFrEF 10-15% (ischemic), MI, s/p AICD vs PPM, possible Afib, HTN, DM2 on insulin, possible CKD, and gout, who presented with a chief complaint of generalized weakness s/p septic shock likely 2/2 LE cellulitis. Now with AMS likely from brainstem infarct and/or toxic metabolic encephalopathy, now with candidemia    NEURO  #AMS  - Pt non responsive since 3/22. Vital signs normal during the event. Pt intubated for airway protection. Stroke code called. CT, CTA negative. VEEG showed moderate slowing but no seizure activity. Patients mental status worsening/ not improving likely 2/2 to underlying infection. No spontaneous movement of LUE. Neuro exam unchanged.   -Repeat CT head 4/11 performed due to concern for not moving LUE, however only showing only chronic infarctions.   - MRI (from 3/26) read addendum showing  C3/C4 level there is a an approximately 1.5 cm long extrusion coursing superiorly to the C2/C3 contacting the ventral spinal cord- Per neurology, this may be contributing to weakness, however would not explain change in mental status.   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 Per neuro, event could have resulted from hypoperfusion.   - C/w Modafinil.   - Unclear neuro prognosis at this point.   - TTE with Definity shows no clot at present  - S/p PEG/trach on 4/4     ID  #Candidemia with candida tropicalis: +SIRS- Bandemia  and fever. Blood cultures positive for candida tropicalis on 4/7. Source unclear at this time.   -S/p Micafungin 100mg q 24hrs (4/7-4/12)  -Fluconazole 200mg q 24hrs (4/13--)  -CTAP 4/11- Negative for abscess  -Surveillance fungal cultures 4/10 NGTD, however not finalized.   -Surveillance cx 4/9 again w/ candida  -RUKHSANA negative for vegetations/ infection of AICD  -Tunelled HD catheter removed 4/8, temp HD placed 4/10  - ID consulted, appreciate recommendations    Sepsis- Febrile 102.1, HR in 90s, hypotensive overnight 4/12. DDx includes persistent fungemia, although less likely given that fungal cultures have been negative. Patient also with persistent diarrhea concerning for c diff and increased yellow secretions/ rhonchi concerning for VAP. Urine culture from 4/11 growing klebsiella sensitive to ceftriaxone.   -C/w ceftriaxone 1g q 24hrs  -S/p  vanc/zosyn overnight 4/12  -F/u repeat urine cx  -F/u sputum culture  -F/u repeat blood/ fungal cultures    #Septic shock 2/2 Cellulitis- Resolved  - C/w midodrine to 15 q8   - Decreased Solucortef 25mg q 24hrs given hyperglycemia/ infection, patient on for hypotension borderline ACTH test  - S/p zosyn for suspected UTI(3/25-3/26)  s/p Zosyn (3/11-2/21). Was previously also on Vanco (3/11-3/17).   - RUQ doppler shows no portal vein thrombosis. given worsening transaminitis and increased residuals RUQ sono was obtained which only shows cholelithiasis.   - TTE with no vegetations. Pt not stable for RUKHSANA.   - Gallium scan showed LLE cellulitis.     - repeat LE CT does not show abscess or gas  - HIV negative  - Vascular surgery on board. Recommending Amputation as an option if pt continues to be unstable.     #UTI- Urine cloudy appearing with increase WBC.- Resolved  - S/p zosyn 3/25-3/26    CARDIOVASCULAR   # HYPOTENSION-   - c/w Midodrine 15 mg q8h to maintain SBP>65  - Solumedrol decreased as above   - Levophed PRN to maintain MAP >65  - CHF with severely reduced EF 15%, caution with fluids. Per renal recs, can give IV albumin for fluids.    # CHF exacerbation- CHF with EF 10-15% per pt 2/2 ischemic cardiomyopathy s/p AICD. Elevated BNP on admission with R sided effusion and edema  - persistent pulmonary vascular congestion on CXR with layering pleural effusions.   - Maintain negative fluid balance with dialysis, watching BP  - Unclear medical regimen opt. Per [facility] pharmacy ( and [place]) pt has not picked up Torsemide 20 or Metoprolol 100 since November.   - Hold ACE/Metoprolol in setting of sepsis/ hypotension    #AFIB- hx of Afib and LV thrombus on coumadin.    - Holding Metoprolol 12.5 q12h given hypotension. Will use Dig for rate control if RVR   - Holding coumadin given thrombocytopenia, will consider starting when plts stable/ uptrending  - S/p heparin gtt, discontinued 2/2 thrombocytopenia concern for HIT.  - TTE with affinity shows no  LV thrombus currently    #CAD- Hx of MI s/p AICD  - Pt with pLAD in 7/2017, was started on Aspirin and Brilinta per records.  - Holding asp 81, coumadin and atorvastatin (discontinued 2/2 LFT uptrend, thrombocytopenia).     #LE Edema   - LE edema with chronic venous stasis.   - Duplex does not show DVT    PULMONARY   #Acute Resp failure- S/p tracheostomy 4/5. Back on vent overnight with CPAP trial this morning. CXR today with worsened opacities on L and R. Bedside US with worsened simple b/l pleural effusions and atelectatic lung. Less concern for infectious source given b/l effusions with no septations/loculations. Likely related to fluid overload. If clinically worsening, can consider thoracentesis for fluid studies and cultures.  -Continue CPAP trials, weaning to trach collar as tolerated.       RENAL   #ARF- likely ischemic ATN in setting of sepsis with low intravascular volume. Unknown baseline Cr presenting with Cr 3.93 with metabolic derangements. Renal team on board, pt with R HD catheter placed by IR on 3/21. Now on intermittent HD. HD catheter removed 4/8 due to fungemia, temporary HD catheter placed 4/10.    -Last dialyzed 4/12, f/u daily renal recs, no dialysis today  -Permacath replaced 4/12 (Cultures clear x 48hrs, approved by ID)  - c/w intermittent HD per renal recs   - retroperitoneal ultrasound showing medical renal disease.     #Elevated AG- AG 21, lactate negative, glucose has been well controlled, possibly 2/2 uremia in setting of ESRD.  -F/u repeat BMP    #Hyperkalemia- PT with elevated K to 5.7 on admission,  no EKG changes.  - Continue telemetry monitoring  - Trend K   - c/w dialysis   - Kayexalate PRN    #Metabolic acidosis   - 2/2 Lactate, starvation ketoacidosis and uremia   - resolved     GI   # transaminitis and elevated bili- Patient with transaminitis that has remained stable, however with new elevation in Bili to 2.1 today. Prior RUQ US with cholelithiasis and prior CT abdomen/pelvis consistent with cholelithiasis and ascites.   - Repeat RUQ US negative fo cholecysitis, rec HIDA if strong clinical suspicion.     HEME  #Thrombocytopenia- ELVIRA negative but PF4 was positive. Unlikely HIT. Most likely 2/2 zosyn or sepsis   - Continue to hold coumadin and Aspirin  - Monitor for signs of bleeding   - transfuse if <10 or <50 bleeding    #Elevated INR. Unclear etiology.   - Holding coumadin given thrombocytopenia  - Given Vit K and FFP3/12. FFP 3/13, FFP 4/4  - Monitor coags    #Anemia- Likely 2/2 phlebotomy and CKD, no signs of bleeding. Prior studies showed Anemia of chronic disease.  -Continue to monitor  -s/p 1pRBC on 3/12, 3/16 and 4/4    ENDOCRINE   - Lantus 12U and humalin 7units TID   - MISS  - monitor FSG and adjust as needed   - A1C 8.6%      F: No IVF   E: Replete K<4 Mg<2, caution in setting of acute renal failure  N: PEG Placed 4/4, 1.5 Glucerna      Lines: R dialysis permacath (4/12)  Drips: Levophed  Full code  Dispo: MICU  Ppx: PPI, no heparin/ coumadin given thrombocytopenia 63M PMH HFrEF 10-15% (ischemic), MI, s/p AICD vs PPM, possible Afib, HTN, DM2 on insulin, possible CKD, and gout, who presented with a chief complaint of generalized weakness s/p septic shock likely 2/2 LE cellulitis. Now with AMS likely from brainstem infarct and/or toxic metabolic encephalopathy, now with candidemia (resolving) and sepsis 2/2 GNR bacteremia, Ucx + klebsiella.     NEURO  #AMS  - Pt non responsive since 3/22. Vital signs normal during the event. Pt intubated for airway protection. Stroke code called. CT, CTA negative. VEEG showed moderate slowing but no seizure activity. Patients mental status worsening/ not improving likely 2/2 to underlying infection. No spontaneous movement of LUE. Neuro exam unchanged.   -Repeat CT head 4/11 performed due to concern for not moving LUE, however only showing only chronic infarctions.   - MRI (from 3/26) read addendum showing  C3/C4 level there is a an approximately 1.5 cm long extrusion coursing superiorly to the C2/C3 contacting the ventral spinal cord- Per neurology, this may be contributing to weakness, however would not explain change in mental status.   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 Per neuro, event could have resulted from hypoperfusion.   - C/w Modafinil.   - Unclear neuro prognosis at this point.   - TTE with Definity shows no clot at present  - S/p PEG/trach on 4/4     ID  #Candidemia with candida tropicalis: +SIRS- Bandemia  and fever. Blood cultures positive for candida tropicalis on 4/7. Source unclear at this time.   -S/p Micafungin 100mg q 24hrs (4/7-4/12)  -Fluconazole 200mg q 24hrs (4/13--)  -CTAP 4/11- Negative for abscess  -Surveillance fungal cultures 4/10 NGTD, however not finalized.   -Surveillance cx 4/9 again w/ candida  -RUKHSANA negative for vegetations/ infection of AICD  -Tunelled HD catheter removed 4/8, temp HD placed 4/10  - ID consulted, appreciate recommendations    Sepsis- Febrile 102.1, HR in 90s, hypotensive overnight 4/12. DDx includes persistent fungemia, although less likely given that fungal cultures have been negative. Patient also with persistent diarrhea concerning for c diff and increased yellow secretions/ rhonchi concerning for VAP. Urine culture from 4/11 growing klebsiella sensitive to ceftriaxone.   -C/w ceftriaxone 1g q 24hrs  -S/p  vanc/zosyn overnight 4/12  -F/u repeat urine cx  -F/u sputum culture  -F/u repeat blood/ fungal cultures    #Septic shock 2/2 Cellulitis- Resolved  - C/w midodrine to 15 q8   - Decreased Solucortef 25mg q 24hrs given hyperglycemia/ infection, patient on for hypotension borderline ACTH test  - S/p zosyn for suspected UTI(3/25-3/26)  s/p Zosyn (3/11-2/21). Was previously also on Vanco (3/11-3/17).   - RUQ doppler shows no portal vein thrombosis. given worsening transaminitis and increased residuals RUQ sono was obtained which only shows cholelithiasis.   - TTE with no vegetations. Pt not stable for RUKHSANA.   - Gallium scan showed LLE cellulitis.     - repeat LE CT does not show abscess or gas  - HIV negative  - Vascular surgery on board. Recommending Amputation as an option if pt continues to be unstable.     #UTI- Urine cloudy appearing with increase WBC.- Resolved  - S/p zosyn 3/25-3/26    CARDIOVASCULAR   # HYPOTENSION-   - c/w Midodrine 15 mg q8h to maintain SBP>65  - Solumedrol decreased as above   - Levophed PRN to maintain MAP >65  - CHF with severely reduced EF 15%, caution with fluids. Per renal recs, can give IV albumin for fluids.    # CHF exacerbation- CHF with EF 10-15% per pt 2/2 ischemic cardiomyopathy s/p AICD. Elevated BNP on admission with R sided effusion and edema  - persistent pulmonary vascular congestion on CXR with layering pleural effusions.   - Maintain negative fluid balance with dialysis, watching BP  - Unclear medical regimen opt. Per [facility] pharmacy ( and [place]) pt has not picked up Torsemide 20 or Metoprolol 100 since November.   - Hold ACE/Metoprolol in setting of sepsis/ hypotension    #AFIB- hx of Afib and LV thrombus on coumadin.    - Holding Metoprolol 12.5 q12h given hypotension. Will use Dig for rate control if RVR   - Holding coumadin given thrombocytopenia, will consider starting when plts stable/ uptrending  - S/p heparin gtt, discontinued 2/2 thrombocytopenia concern for HIT.  - TTE with affinity shows no  LV thrombus currently    #CAD- Hx of MI s/p AICD  - Pt with pLAD in 7/2017, was started on Aspirin and Brilinta per records.  - Holding asp 81, coumadin and atorvastatin (discontinued 2/2 LFT uptrend, thrombocytopenia).     #LE Edema   - LE edema with chronic venous stasis.   - Duplex does not show DVT    PULMONARY   #Acute Resp failure- S/p tracheostomy 4/5. Back on vent overnight with CPAP trial this morning. CXR today with worsened opacities on L and R. Bedside US with worsened simple b/l pleural effusions and atelectatic lung. Less concern for infectious source given b/l effusions with no septations/loculations. Likely related to fluid overload. If clinically worsening, can consider thoracentesis for fluid studies and cultures.  -Continue CPAP trials, weaning to trach collar as tolerated.       RENAL   #ARF- likely ischemic ATN in setting of sepsis with low intravascular volume. Unknown baseline Cr presenting with Cr 3.93 with metabolic derangements. Renal team on board, pt with R HD catheter placed by IR on 3/21. Now on intermittent HD. HD catheter removed 4/8 due to fungemia, temporary HD catheter placed 4/10.    -Last dialyzed 4/12, f/u daily renal recs, no dialysis today  -Permacath replaced 4/12 (Cultures clear x 48hrs, approved by ID)  - c/w intermittent HD per renal recs   - retroperitoneal ultrasound showing medical renal disease.     #Elevated AG- AG 21, lactate negative, glucose has been well controlled, possibly 2/2 uremia in setting of ESRD.  -F/u repeat BMP    #Hyperkalemia- PT with elevated K to 5.7 on admission,  no EKG changes.  - Continue telemetry monitoring  - Trend K   - c/w dialysis   - Kayexalate PRN    #Metabolic acidosis   - 2/2 Lactate, starvation ketoacidosis and uremia   - resolved     GI   # transaminitis and elevated bili- Patient with transaminitis that has remained stable, however with new elevation in Bili to 2.1 today. Prior RUQ US with cholelithiasis and prior CT abdomen/pelvis consistent with cholelithiasis and ascites.   - Repeat RUQ US negative fo cholecysitis, rec HIDA if strong clinical suspicion.     HEME  #Thrombocytopenia- ELVIRA negative but PF4 was positive. Unlikely HIT. Most likely 2/2 zosyn or sepsis   - Continue to hold coumadin and Aspirin  - Monitor for signs of bleeding   - transfuse if <10 or <50 bleeding    #Elevated INR. Unclear etiology.   - Holding coumadin given thrombocytopenia  - Given Vit K and FFP3/12. FFP 3/13, FFP 4/4  - Monitor coags    #Anemia- Likely 2/2 phlebotomy and CKD, no signs of bleeding. Prior studies showed Anemia of chronic disease.  -Continue to monitor  -s/p 1pRBC on 3/12, 3/16 and 4/4    ENDOCRINE   - Lantus 12U and humalin 7units TID   - MISS  - monitor FSG and adjust as needed   - A1C 8.6%      F: No IVF   E: Replete K<4 Mg<2, caution in setting of acute renal failure  N: PEG Placed 4/4, 1.5 Glucerna      Lines: R dialysis permacath (4/12)  Drips: Levophed  Full code  Dispo: MICU  Ppx: PPI, no heparin/ coumadin given thrombocytopenia

## 2018-04-13 NOTE — PROGRESS NOTE ADULT - SUBJECTIVE AND OBJECTIVE BOX
INTERVAL HPI/OVERNIGHT EVENTS:    OVERNIGHT: No overnight events. 101.1 rectally at 600. WBC 10.1 to 9.9. Team added vancomycin and zosyn. On levo gtt.  SUBJECTIVE: Patient seen and examined at bedside. Unable to obtain ROS.    OBJECTIVE:    VITAL SIGNS:  ICU Vital Signs Last 24 Hrs  T(C): 38.4 (13 Apr 2018 06:20), Max: 38.4 (13 Apr 2018 06:20)  T(F): 101.1 (13 Apr 2018 06:20), Max: 101.1 (13 Apr 2018 06:20)  HR: 94 (13 Apr 2018 10:00) (86 - 116)  BP: 87/67 (13 Apr 2018 10:00) (67/52 - 118/84)  BP(mean): 72 (13 Apr 2018 10:00) (57 - 114)  ABP: --  ABP(mean): --  RR: 23 (13 Apr 2018 10:00) (7 - 28)  SpO2: 100% (13 Apr 2018 10:00) (90% - 100%)    Mode: CPAP with PS, FiO2: 40, PEEP: 5, PS: 8, MAP: 8, PIP: 13    04-12 @ 07:01 - 04-13 @ 07:00  --------------------------------------------------------  IN: 820 mL / OUT: 1400 mL / NET: -580 mL    04-13 @ 07:01 - 04-13 @ 12:04  --------------------------------------------------------  IN: 574 mL / OUT: 0 mL / NET: 574 mL      CAPILLARY BLOOD GLUCOSE      POCT Blood Glucose.: 166 mg/dL (13 Apr 2018 11:42)      PHYSICAL EXAM:    General: NAD  HEENT: NCAT, PERRL, clear conjunctiva, no scleral icterus  Neck: supple, no JVD; with trach in place  Respiratory: CTA b/l  Cardiovascular: RRR, normal S1S2, no M/R/G  Abdomen: soft, NT/ND  Extremities: WWP, no edema  Neuro: AOx0    MEDICATIONS:  MEDICATIONS  (STANDING):  cefTRIAXone   IVPB 1 Gram(s) IV Intermittent every 24 hours  chlorhexidine 0.12% Liquid 15 milliLiter(s) Swish and Spit two times a day  chlorhexidine 2% Cloths 1 Application(s) Topical daily  dextrose 5%. 1000 milliLiter(s) (50 mL/Hr) IV Continuous <Continuous>  dextrose 50% Injectable 12.5 Gram(s) IV Push once  dextrose 50% Injectable 25 Gram(s) IV Push once  dextrose 50% Injectable 25 Gram(s) IV Push once  ergocalciferol Drops 6000 Unit(s) Oral daily  escitalopram 5 milliGRAM(s) Oral daily  fluconAZOLE IVPB 200 milliGRAM(s) IV Intermittent every 24 hours  folic acid 1 milliGRAM(s) Oral daily  hydrocortisone sodium succinate Injectable 25 milliGRAM(s) IV Push every 24 hours  insulin glargine Injectable (LANTUS) 12 Unit(s) SubCutaneous at bedtime  insulin regular  human corrective regimen sliding scale   SubCutaneous every 6 hours  insulin regular  human recombinant 7 Unit(s) SubCutaneous every 6 hours  midodrine 15 milliGRAM(s) Oral every 8 hours  modafinil 100 milliGRAM(s) Oral <User Schedule>  multivitamin 1 Tablet(s) Oral daily  norepinephrine Infusion 0.01 MICROgram(s)/kG/Min (1.5 mL/Hr) IV Continuous <Continuous>  pantoprazole   Suspension 40 milliGRAM(s) Oral daily  thiamine 100 milliGRAM(s) Oral daily    MEDICATIONS  (PRN):  acetaminophen    Suspension. 650 milliGRAM(s) Oral every 6 hours PRN Moderate Pain (4 - 6)  dextrose Gel 1 Dose(s) Oral once PRN Blood Glucose LESS THAN 70 milliGRAM(s)/deciliter  glucagon  Injectable 1 milliGRAM(s) IntraMuscular once PRN Glucose LESS THAN 70 milligrams/deciliter      ALLERGIES:  Allergies    No Known Allergies    Intolerances        LABS:                        8.2    9.9   )-----------( 183      ( 13 Apr 2018 04:20 )             26.7     04-13    137  |  94<L>  |  85<H>  ----------------------------<  151<H>  4.8   |  23  |  4.46<H>    Ca    8.8      13 Apr 2018 09:09  Phos  2.8     04-13  Mg     2.0     04-13    TPro  6.4  /  Alb  3.1<L>  /  TBili  0.9  /  DBili  x   /  AST  20  /  ALT  32  /  AlkPhos  171<H>  04-12    Culture - Urine (04.11.18 @ 12:23)    -  Ampicillin: R >16 These ampicillin results predict results for amoxicillin    -  Ampicillin/Sulbactam: S <=8/4    -  Cefazolin: S <=8 This predicts results for oral agents cefaclor, cefdinir, cefpodoxime, cefprozil, cefuroxime axetil, cephalexin and locarbef for uncomplicated UTI. Note that some isolates may be susceptible to these agents while testing resistant to cefazolin.    -  Ceftriaxone: S <=1 Enterobacter, Citrobacter, and Serratia may develop resistance during prolonged therapy    -  Ciprofloxacin: S <=1    -  Gentamicin: S <=4    -  Nitrofurantoin: R >64 Should not be used to treat pyelonephritis    -  Piperacillin/Tazobactam: S <=16    -  Tobramycin: S <=4    -  Trimethoprim/Sulfamethoxazole: S <=2/38    Specimen Source: .Urine Catheterized    Culture Results:   >100,000 CFU/ml Klebsiella pneumoniae    Organism Identification: Klebsiella pneumoniae    Organism: Klebsiella pneumoniae    Method Type: YONATHAN    Culture - Fungal, Blood (04.10.18 @ 00:38)    Specimen Source: .Blood Blood-Peripheral    Culture Results:   Testing in progress          RADIOLOGY & ADDITIONAL TESTS: Studies reviewed.

## 2018-04-14 LAB
ANION GAP SERPL CALC-SCNC: 15 MMOL/L — SIGNIFICANT CHANGE UP (ref 5–17)
BUN SERPL-MCNC: 97 MG/DL — HIGH (ref 7–23)
CALCIUM SERPL-MCNC: 9.7 MG/DL — SIGNIFICANT CHANGE UP (ref 8.4–10.5)
CHLORIDE SERPL-SCNC: 101 MMOL/L — SIGNIFICANT CHANGE UP (ref 96–108)
CO2 SERPL-SCNC: 24 MMOL/L — SIGNIFICANT CHANGE UP (ref 22–31)
CREAT SERPL-MCNC: 5.1 MG/DL — HIGH (ref 0.5–1.3)
CULTURE RESULTS: SIGNIFICANT CHANGE UP
CULTURE RESULTS: SIGNIFICANT CHANGE UP
GLUCOSE BLDC GLUCOMTR-MCNC: 148 MG/DL — HIGH (ref 70–99)
GLUCOSE BLDC GLUCOMTR-MCNC: 185 MG/DL — HIGH (ref 70–99)
GLUCOSE BLDC GLUCOMTR-MCNC: 57 MG/DL — LOW (ref 70–99)
GLUCOSE BLDC GLUCOMTR-MCNC: 64 MG/DL — LOW (ref 70–99)
GLUCOSE BLDC GLUCOMTR-MCNC: 87 MG/DL — SIGNIFICANT CHANGE UP (ref 70–99)
GLUCOSE SERPL-MCNC: 92 MG/DL — SIGNIFICANT CHANGE UP (ref 70–99)
GRAM STN FLD: SIGNIFICANT CHANGE UP
HCT VFR BLD CALC: 28.5 % — LOW (ref 39–50)
HGB BLD-MCNC: 8.8 G/DL — LOW (ref 13–17)
LYMPHOCYTES # BLD AUTO: 2 % — LOW (ref 13–44)
MAGNESIUM SERPL-MCNC: 2.2 MG/DL — SIGNIFICANT CHANGE UP (ref 1.6–2.6)
MCHC RBC-ENTMCNC: 29.7 PG — SIGNIFICANT CHANGE UP (ref 27–34)
MCHC RBC-ENTMCNC: 30.9 G/DL — LOW (ref 32–36)
MCV RBC AUTO: 96.3 FL — SIGNIFICANT CHANGE UP (ref 80–100)
MONOCYTES NFR BLD AUTO: 3 % — SIGNIFICANT CHANGE UP (ref 2–14)
NEUTROPHILS NFR BLD AUTO: 77 % — SIGNIFICANT CHANGE UP (ref 43–77)
PHOSPHATE SERPL-MCNC: 3.1 MG/DL — SIGNIFICANT CHANGE UP (ref 2.5–4.5)
PLATELET # BLD AUTO: 284 K/UL — SIGNIFICANT CHANGE UP (ref 150–400)
POTASSIUM SERPL-MCNC: 4.9 MMOL/L — SIGNIFICANT CHANGE UP (ref 3.5–5.3)
POTASSIUM SERPL-SCNC: 4.9 MMOL/L — SIGNIFICANT CHANGE UP (ref 3.5–5.3)
RBC # BLD: 2.96 M/UL — LOW (ref 4.2–5.8)
RBC # FLD: 18.5 % — HIGH (ref 10.3–16.9)
SODIUM SERPL-SCNC: 140 MMOL/L — SIGNIFICANT CHANGE UP (ref 135–145)
SPECIMEN SOURCE: SIGNIFICANT CHANGE UP
SPECIMEN SOURCE: SIGNIFICANT CHANGE UP
WBC # BLD: 11.5 K/UL — HIGH (ref 3.8–10.5)
WBC # FLD AUTO: 11.5 K/UL — HIGH (ref 3.8–10.5)

## 2018-04-14 PROCEDURE — 99291 CRITICAL CARE FIRST HOUR: CPT

## 2018-04-14 PROCEDURE — 99233 SBSQ HOSP IP/OBS HIGH 50: CPT

## 2018-04-14 PROCEDURE — 71045 X-RAY EXAM CHEST 1 VIEW: CPT | Mod: 26

## 2018-04-14 PROCEDURE — 90935 HEMODIALYSIS ONE EVALUATION: CPT | Mod: GC

## 2018-04-14 RX ORDER — HEPARIN SODIUM 5000 [USP'U]/ML
5000 INJECTION INTRAVENOUS; SUBCUTANEOUS EVERY 8 HOURS
Qty: 0 | Refills: 0 | Status: DISCONTINUED | OUTPATIENT
Start: 2018-04-14 | End: 2018-04-16

## 2018-04-14 RX ORDER — ALBUMIN HUMAN 25 %
50 VIAL (ML) INTRAVENOUS
Qty: 0 | Refills: 0 | Status: COMPLETED | OUTPATIENT
Start: 2018-04-14 | End: 2018-04-14

## 2018-04-14 RX ORDER — DEXTROSE 50 % IN WATER 50 %
25 SYRINGE (ML) INTRAVENOUS ONCE
Qty: 0 | Refills: 0 | Status: COMPLETED | OUTPATIENT
Start: 2018-04-14 | End: 2018-04-14

## 2018-04-14 RX ORDER — PIPERACILLIN AND TAZOBACTAM 4; .5 G/20ML; G/20ML
2.25 INJECTION, POWDER, LYOPHILIZED, FOR SOLUTION INTRAVENOUS EVERY 6 HOURS
Qty: 0 | Refills: 0 | Status: DISCONTINUED | OUTPATIENT
Start: 2018-04-14 | End: 2018-04-15

## 2018-04-14 RX ADMIN — Medication 50 MILLILITER(S): at 12:20

## 2018-04-14 RX ADMIN — ERGOCALCIFEROL 6000 UNIT(S): 1.25 CAPSULE ORAL at 14:50

## 2018-04-14 RX ADMIN — PIPERACILLIN AND TAZOBACTAM 200 GRAM(S): 4; .5 INJECTION, POWDER, LYOPHILIZED, FOR SOLUTION INTRAVENOUS at 11:46

## 2018-04-14 RX ADMIN — INSULIN GLARGINE 12 UNIT(S): 100 INJECTION, SOLUTION SUBCUTANEOUS at 00:01

## 2018-04-14 RX ADMIN — Medication 100 MILLIGRAM(S): at 14:45

## 2018-04-14 RX ADMIN — Medication 25 MILLILITER(S): at 06:40

## 2018-04-14 RX ADMIN — Medication 25 MILLIGRAM(S): at 03:44

## 2018-04-14 RX ADMIN — PIPERACILLIN AND TAZOBACTAM 200 GRAM(S): 4; .5 INJECTION, POWDER, LYOPHILIZED, FOR SOLUTION INTRAVENOUS at 17:39

## 2018-04-14 RX ADMIN — Medication 1 MILLIGRAM(S): at 14:45

## 2018-04-14 RX ADMIN — INSULIN HUMAN 4: 100 INJECTION, SOLUTION SUBCUTANEOUS at 00:01

## 2018-04-14 RX ADMIN — HEPARIN SODIUM 5000 UNIT(S): 5000 INJECTION INTRAVENOUS; SUBCUTANEOUS at 21:31

## 2018-04-14 RX ADMIN — PANTOPRAZOLE SODIUM 40 MILLIGRAM(S): 20 TABLET, DELAYED RELEASE ORAL at 14:50

## 2018-04-14 RX ADMIN — PIPERACILLIN AND TAZOBACTAM 200 GRAM(S): 4; .5 INJECTION, POWDER, LYOPHILIZED, FOR SOLUTION INTRAVENOUS at 23:29

## 2018-04-14 RX ADMIN — MODAFINIL 100 MILLIGRAM(S): 200 TABLET ORAL at 14:46

## 2018-04-14 RX ADMIN — Medication 50 MILLILITER(S): at 13:20

## 2018-04-14 RX ADMIN — CHLORHEXIDINE GLUCONATE 15 MILLILITER(S): 213 SOLUTION TOPICAL at 14:47

## 2018-04-14 RX ADMIN — Medication 1 TABLET(S): at 14:45

## 2018-04-14 RX ADMIN — Medication 50 MILLILITER(S): at 11:20

## 2018-04-14 RX ADMIN — MODAFINIL 100 MILLIGRAM(S): 200 TABLET ORAL at 06:39

## 2018-04-14 RX ADMIN — MIDODRINE HYDROCHLORIDE 15 MILLIGRAM(S): 2.5 TABLET ORAL at 17:38

## 2018-04-14 RX ADMIN — CHLORHEXIDINE GLUCONATE 15 MILLILITER(S): 213 SOLUTION TOPICAL at 21:31

## 2018-04-14 RX ADMIN — HEPARIN SODIUM 5000 UNIT(S): 5000 INJECTION INTRAVENOUS; SUBCUTANEOUS at 14:47

## 2018-04-14 RX ADMIN — MIDODRINE HYDROCHLORIDE 15 MILLIGRAM(S): 2.5 TABLET ORAL at 03:46

## 2018-04-14 RX ADMIN — CHLORHEXIDINE GLUCONATE 1 APPLICATION(S): 213 SOLUTION TOPICAL at 06:38

## 2018-04-14 RX ADMIN — FLUCONAZOLE 100 MILLIGRAM(S): 150 TABLET ORAL at 21:31

## 2018-04-14 RX ADMIN — ESCITALOPRAM OXALATE 5 MILLIGRAM(S): 10 TABLET, FILM COATED ORAL at 14:45

## 2018-04-14 RX ADMIN — Medication 1.5 MICROGRAM(S)/KG/MIN: at 18:47

## 2018-04-14 RX ADMIN — INSULIN HUMAN 7 UNIT(S): 100 INJECTION, SOLUTION SUBCUTANEOUS at 00:02

## 2018-04-14 RX ADMIN — MIDODRINE HYDROCHLORIDE 15 MILLIGRAM(S): 2.5 TABLET ORAL at 12:26

## 2018-04-14 RX ADMIN — INSULIN HUMAN 2: 100 INJECTION, SOLUTION SUBCUTANEOUS at 18:30

## 2018-04-14 RX ADMIN — MEROPENEM 100 MILLIGRAM(S): 1 INJECTION INTRAVENOUS at 06:38

## 2018-04-14 NOTE — PROGRESS NOTE ADULT - PROBLEM SELECTOR PLAN 1
Patient is a 63  year old male with acute on chronic renal failure from ischemic ATN. Patient is currently requiring hemodialysis. Patient was last dialyzed 4/12 with UF of 1kg. Patient underwent permcath placement on 4/12     P - Plan for HD today for maintenance HD  Given low BP, will minimize UF. Perhaps 0.5kg as volume balance not excessive in overload  Albumin support

## 2018-04-14 NOTE — PROGRESS NOTE ADULT - CONSTITUTIONAL COMMENTS
disheveled, unkempt - not well developed or well groomed. Perhaps well nourished as has enteral feeding tubes disheveled, unkempt  has enteral feeding tubes

## 2018-04-14 NOTE — PROGRESS NOTE ADULT - SUBJECTIVE AND OBJECTIVE BOX
Patient was seen and evaluated on dialysis.   Patient is tolerating the procedure well.   HR: 96 (04-14-18 @ 13:00)  BP: 96/72 (04-14-18 @ 13:00)  Continue dialysis:   Dialyzer: Revaclear 300         QB:    300    QD: 500 2K bath   Goal UF 0.5kg over 3 Hours

## 2018-04-14 NOTE — PROGRESS NOTE ADULT - ATTENDING COMMENTS
Pt awake but not following commands. Pt started on Zosyn yesterday AM for Kleb UTI and Permacath placed afterwards. Now BC positive for likely Kleb and Zosyn to continue. Will need to remove permacath if signs of worsening sepsis or hypotension or if blood Cx do not clear. Continue CPAP and consider trach collar trial. HD today.

## 2018-04-14 NOTE — PROGRESS NOTE ADULT - ASSESSMENT
Patient is a 63 year old male with a history of HFrEF 10-15% due to ischemic cardiomyopathy, s/p AICD, ?atrial fibrillation, hypertension, diabetes mellitus type 2, gout, and CKD for whom nephrology was called for GILMER from ATN requiring hemodialysis. Currently dialysis dependent being dialyzed T/T/S

## 2018-04-14 NOTE — PROGRESS NOTE ADULT - SUBJECTIVE AND OBJECTIVE BOX
PGY1 PROGRESS NOTE:    OVERNIGHT EVENTS: Levo requirements increasing.    SUBJECTIVE / INTERVAL HPI: Patient seen and examined at bedside.     VITAL SIGNS:  Vital Signs Last 24 Hrs  T(C): 36.8 (14 Apr 2018 01:00), Max: 38.4 (13 Apr 2018 06:20)  T(F): 98.3 (14 Apr 2018 01:00), Max: 101.1 (13 Apr 2018 06:20)  HR: 104 (14 Apr 2018 05:00) (88 - 110)  BP: 95/68 (14 Apr 2018 05:00) (77/57 - 98/72)  BP(mean): 87 (14 Apr 2018 03:33) (63 - 87)  RR: 21 (14 Apr 2018 05:00) (10 - 31)  SpO2: 100% (14 Apr 2018 05:00) (94% - 100%)    PHYSICAL EXAM:  General: Awake, uncomfortable appearing. Responding to questions intermittently with head nods. NAD.   HEENT: NC/AT; pupils equal, clear conjunctiva  Neck: +Trach in place, w/ small amount of yellowish secretions  Respiratory: Bilateral rhonchi, no wheezing. Non-labored breathing on ventilator.   Cardiovascular: +S1/S2; RRR, no m/r/g  Abdomen: soft, NT/ND; +BS  Extremities: WWP, 2+ peripheral pulses b/l; 2+ LE dependent edema  Skin: normal color and turgor; no rash  Neurological: Awake, able to nod to questions. Movement of RUE spontaneously No movement of LUE, b/l LE. Does not withdraw to pain in L arm. Grimaces to pain in R arm. All extremities rigid.    MEDICATIONS:  MEDICATIONS  (STANDING):  chlorhexidine 0.12% Liquid 15 milliLiter(s) Swish and Spit two times a day  chlorhexidine 2% Cloths 1 Application(s) Topical daily  dextrose 5%. 1000 milliLiter(s) (50 mL/Hr) IV Continuous <Continuous>  dextrose 50% Injectable 12.5 Gram(s) IV Push once  dextrose 50% Injectable 25 Gram(s) IV Push once  dextrose 50% Injectable 25 Gram(s) IV Push once  ergocalciferol Drops 6000 Unit(s) Oral daily  escitalopram 5 milliGRAM(s) Oral daily  fluconAZOLE IVPB 200 milliGRAM(s) IV Intermittent every 24 hours  folic acid 1 milliGRAM(s) Oral daily  hydrocortisone sodium succinate Injectable 25 milliGRAM(s) IV Push every 24 hours  insulin glargine Injectable (LANTUS) 12 Unit(s) SubCutaneous at bedtime  insulin regular  human corrective regimen sliding scale   SubCutaneous every 6 hours  insulin regular  human recombinant 7 Unit(s) SubCutaneous every 6 hours  meropenem  IVPB 500 milliGRAM(s) IV Intermittent every 12 hours  meropenem  IVPB 500 milliGRAM(s) IV Intermittent every 12 hours  midodrine 15 milliGRAM(s) Oral every 8 hours  modafinil 100 milliGRAM(s) Oral <User Schedule>  multivitamin 1 Tablet(s) Oral daily  norepinephrine Infusion 0.01 MICROgram(s)/kG/Min (1.5 mL/Hr) IV Continuous <Continuous>  pantoprazole   Suspension 40 milliGRAM(s) Oral daily  thiamine 100 milliGRAM(s) Oral daily    MEDICATIONS  (PRN):  acetaminophen    Suspension. 650 milliGRAM(s) Oral every 6 hours PRN Moderate Pain (4 - 6)  dextrose Gel 1 Dose(s) Oral once PRN Blood Glucose LESS THAN 70 milliGRAM(s)/deciliter  glucagon  Injectable 1 milliGRAM(s) IntraMuscular once PRN Glucose LESS THAN 70 milligrams/deciliter      ALLERGIES:  No Known Allergies      LABS:                        8.2    9.9   )-----------( 183      ( 13 Apr 2018 04:20 )             26.7     04-13    137  |  94<L>  |  85<H>  ----------------------------<  151<H>  4.8   |  23  |  4.46<H>    Ca    8.8      13 Apr 2018 09:09  Phos  2.8     04-13  Mg     2.0     04-13      CAPILLARY BLOOD GLUCOSE  POCT Blood Glucose.: 214 mg/dL (13 Apr 2018 23:52)  POCT Blood Glucose.: 142 mg/dL (13 Apr 2018 16:56)  POCT Blood Glucose.: 166 mg/dL (13 Apr 2018 11:42)  POCT Blood Glucose.: 102 mg/dL (13 Apr 2018 06:12)  POCT Blood Glucose.: 109 mg/dL (13 Apr 2018 05:50)        04-12-18 @ 07:01  -  04-13-18 @ 07:00  --------------------------------------------------------  IN: 820 mL / OUT: 1400 mL / NET: -580 mL    04-13-18 @ 07:01  -  04-14-18 @ 05:24  --------------------------------------------------------  IN: 2516 mL / OUT: 700 mL / NET: 1816 mL          RADIOLOGY & ADDITIONAL TESTS: Reviewed.    63M PMH HFrEF 10-15% (ischemic), MI, s/p AICD vs PPM, possible Afib, HTN, DM2 on insulin, possible CKD, and gout, who presented with a chief complaint of generalized weakness s/p septic shock likely 2/2 LE cellulitis. Now with AMS likely from brainstem infarct and/or toxic metabolic encephalopathy, now with candidemia (resolving) and sepsis 2/2 GNR bacteremia, Ucx + klebsiella.     NEURO  #AMS  - Pt non responsive since 3/22. Vital signs normal during the event. Pt intubated for airway protection. Stroke code called. CT, CTA negative. VEEG showed moderate slowing but no seizure activity. Patients mental status worsening/ not improving likely 2/2 to underlying infection. No spontaneous movement of LUE. Neuro exam unchanged.   - Repeat CT head 4/11 performed due to concern for not moving LUE, however only showing only chronic infarctions.   - MRI (from 3/26) read addendum showing  C3/C4 level there is a an approximately 1.5 cm long extrusion coursing superiorly to the C2/C3 contacting the ventral spinal cord- Per neurology, this may be contributing to weakness, however would not explain change in mental status. 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 Per neuro, event could have resulted from hypoperfusion.   - C/w Modafinil  - Unclear neuro prognosis at this point.   - S/p PEG/trach on 4/4    ID  #Candidemia with candida tropicalis: +SIRS- Bandemia  and fever. Blood cultures positive for candida tropicalis on 4/7 and again on 4/9. Source unclear at this time.   - S/p Micafungin 100mg q 24hrs (4/7-4/12)  - Fluconazole 200mg q 24hrs (4/13--)  - CTAP 4/11- Negative for abscess  - Surveillance fungal cultures 4/10 NGTD, however not finalized.   - RUKHSANA negative for vegetations/ infection of AICD  - Tunelled HD catheter removed 4/8, temp HD placed 4/10  - ID consulted, appreciate recommendations    #Sepsis- DDx includes persistent fungemia, UTI with urine culture from 4/11 growing klebsiella sensitive to ceftriaxone, and now bacteremia with GNR likely 2/2 klebsiella from UTI.   - switched from ceftriaxone to meropenem, will get ID approval today   - F/u repeat urine cx  - F/u sputum culture  - F/u repeat blood/ fungal cultures    #Septic shock 2/2 Cellulitis- Resolved  - C/w midodrine to 15 q8   - Decreased Solucortef 25mg q 24hrs given hyperglycemia/ infection, patient on for hypotension borderline ACTH test  - S/p zosyn for suspected UTI (3/25-3/26)  s/p Zosyn (3/11-2/21). Was previously also on Vanco (3/11-3/17).   - RUQ doppler shows no portal vein thrombosis. given worsening transaminitis and increased residuals RUQ sono was obtained which only shows cholelithiasis.   - TTE with no vegetations. Pt not stable for RUKHSANA.   - Gallium scan showed LLE cellulitis.     - repeat LE CT does not show abscess or gas  - HIV negative  - Vascular surgery on board. Recommending Amputation as an option if pt continues to be unstable.     #UTI- urine culture from 4/11 growing klebsiella sensitive to ceftriaxone, and now bacteremia with GNR likely 2/2 klebsiella from UTI.   - S/p zosyn 3/25-3/26 and Ceftriaxone   - now on meropenem for GNR bacteremia likely 2/2 UTI    CARDIOVASCULAR   # HYPOTENSION-   - c/w Midodrine 15 mg q8h to maintain SBP>65  - Solumedrol decreased as above   - Levophed PRN to maintain MAP >65  - CHF with severely reduced EF 15%, caution with fluids. Per renal recs, can give IV albumin for fluids.    # CHF exacerbation- CHF with EF 10-15% per pt 2/2 ischemic cardiomyopathy s/p AICD. Elevated BNP on admission with R sided effusion and edema  - persistent pulmonary vascular congestion on CXR with layering pleural effusions.   - Maintain negative fluid balance with dialysis, watching BP  - Unclear medical regimen opt. Per [facility] pharmacy ( and [place]) pt has not picked up Torsemide 20 or Metoprolol 100 since November.   - Hold ACE/Metoprolol in setting of sepsis/ hypotension    #AFIB- hx of Afib and LV thrombus on coumadin.    - Holding Metoprolol 12.5 q12h given hypotension. Will use Dig for rate control if RVR   - Holding coumadin given thrombocytopenia, will consider starting when plts stable/ uptrending  - S/p heparin gtt, discontinued 2/2 thrombocytopenia concern for HIT.  - TTE with affinity shows no  LV thrombus currently    #CAD- Hx of MI s/p AICD  - Pt with pLAD in 7/2017, was started on Aspirin and Brilinta per records.  - Holding asp 81, coumadin and atorvastatin (discontinued 2/2 LFT uptrend, thrombocytopenia).     #LE Edema   - LE edema with chronic venous stasis.   - Duplex does not show DVT    PULMONARY   #Acute Resp failure- S/p tracheostomy 4/5. Back on vent overnight with CPAP trial this morning. CXR today with worsened opacities on L and R. Bedside US with worsened simple b/l pleural effusions and atelectatic lung. Less concern for infectious source given b/l effusions with no septations/loculations. Likely related to fluid overload. If clinically worsening, can consider thoracentesis for fluid studies and cultures.  -Continue CPAP trials, weaning to trach collar as tolerated.       RENAL   #ARF- likely ischemic ATN in setting of sepsis with low intravascular volume. Unknown baseline Cr presenting with Cr 3.93 with metabolic derangements. Renal team on board, pt with R HD catheter placed by IR on 3/21. Now on intermittent HD. HD catheter removed 4/8 due to fungemia, temporary HD catheter placed 4/10.    -Last dialyzed 4/12, f/u daily renal recs, no dialysis today  -Permacath replaced 4/12 (Cultures clear x 48hrs, approved by ID)  - c/w intermittent HD per renal recs   - retroperitoneal ultrasound showing medical renal disease.     #Elevated AG- AG 21, lactate negative, glucose has been well controlled, possibly 2/2 uremia in setting of ESRD.  -F/u repeat BMP    #Hyperkalemia- PT with elevated K to 5.7 on admission,  no EKG changes.  - Continue telemetry monitoring  - Trend K   - c/w dialysis   - Kayexalate PRN    #Metabolic acidosis   - 2/2 Lactate, starvation ketoacidosis and uremia   - resolved     GI   # transaminitis and elevated bili- Patient with transaminitis that has remained stable, however with new elevation in Bili to 2.1 today. Prior RUQ US with cholelithiasis and prior CT abdomen/pelvis consistent with cholelithiasis and ascites.   - Repeat RUQ US negative fo cholecysitis, rec HIDA if strong clinical suspicion.     HEME  #Thrombocytopenia- ELVIRA negative but PF4 was positive. Unlikely HIT. Most likely 2/2 zosyn or sepsis   - Continue to hold coumadin and Aspirin  - Monitor for signs of bleeding   - transfuse if <10 or <50 bleeding    #Elevated INR. Unclear etiology.   - Holding coumadin given thrombocytopenia  - Given Vit K and FFP3/12. FFP 3/13, FFP 4/4  - Monitor coags    #Anemia- Likely 2/2 phlebotomy and CKD, no signs of bleeding. Prior studies showed Anemia of chronic disease.  -Continue to monitor  -s/p 1pRBC on 3/12, 3/16 and 4/4    ENDOCRINE   - Lantus 12U and humalin 7units TID   - MISS  - monitor FSG and adjust as needed   - A1C 8.6%      F: No IVF   E: Replete K<4 Mg<2, caution in setting of acute renal failure  N: PEG Placed 4/4, 1.5 Glucerna      Lines: R dialysis permacath (4/12)  Drips: Levophed  Full code  Dispo: MICU  Ppx: PPI, no heparin/ coumadin given thrombocytopenia PGY1 PROGRESS NOTE:    OVERNIGHT EVENTS: Levo requirements increasing.    SUBJECTIVE / INTERVAL HPI: Patient seen and examined at bedside.     VITAL SIGNS:  Vital Signs Last 24 Hrs  T(C): 36.8 (14 Apr 2018 01:00), Max: 38.4 (13 Apr 2018 06:20)  T(F): 98.3 (14 Apr 2018 01:00), Max: 101.1 (13 Apr 2018 06:20)  HR: 104 (14 Apr 2018 05:00) (88 - 110)  BP: 95/68 (14 Apr 2018 05:00) (77/57 - 98/72)  BP(mean): 87 (14 Apr 2018 03:33) (63 - 87)  RR: 21 (14 Apr 2018 05:00) (10 - 31)  SpO2: 100% (14 Apr 2018 05:00) (94% - 100%)    PHYSICAL EXAM:  General: Awake, uncomfortable appearing. Responding to questions intermittently with head nods. NAD.   HEENT: NC/AT; pupils equal, clear conjunctiva  Neck: +Trach in place, w/ small amount of yellowish secretions  Respiratory: Bilateral rhonchi, no wheezing. Non-labored breathing on ventilator.   Cardiovascular: +S1/S2; RRR, no m/r/g  Abdomen: soft, NT/ND; +BS  Extremities: WWP, 2+ peripheral pulses b/l; 2+ LE dependent edema  Skin: normal color and turgor; no rash  Neurological: Awake, able to nod to questions. Movement of RUE spontaneously No movement of LUE, b/l LE. Does not withdraw to pain in L arm. Grimaces to pain in R arm. All extremities rigid.    MEDICATIONS:  MEDICATIONS  (STANDING):  chlorhexidine 0.12% Liquid 15 milliLiter(s) Swish and Spit two times a day  chlorhexidine 2% Cloths 1 Application(s) Topical daily  dextrose 5%. 1000 milliLiter(s) (50 mL/Hr) IV Continuous <Continuous>  dextrose 50% Injectable 12.5 Gram(s) IV Push once  dextrose 50% Injectable 25 Gram(s) IV Push once  dextrose 50% Injectable 25 Gram(s) IV Push once  ergocalciferol Drops 6000 Unit(s) Oral daily  escitalopram 5 milliGRAM(s) Oral daily  fluconAZOLE IVPB 200 milliGRAM(s) IV Intermittent every 24 hours  folic acid 1 milliGRAM(s) Oral daily  hydrocortisone sodium succinate Injectable 25 milliGRAM(s) IV Push every 24 hours  insulin glargine Injectable (LANTUS) 12 Unit(s) SubCutaneous at bedtime  insulin regular  human corrective regimen sliding scale   SubCutaneous every 6 hours  insulin regular  human recombinant 7 Unit(s) SubCutaneous every 6 hours  meropenem  IVPB 500 milliGRAM(s) IV Intermittent every 12 hours  meropenem  IVPB 500 milliGRAM(s) IV Intermittent every 12 hours  midodrine 15 milliGRAM(s) Oral every 8 hours  modafinil 100 milliGRAM(s) Oral <User Schedule>  multivitamin 1 Tablet(s) Oral daily  norepinephrine Infusion 0.01 MICROgram(s)/kG/Min (1.5 mL/Hr) IV Continuous <Continuous>  pantoprazole   Suspension 40 milliGRAM(s) Oral daily  thiamine 100 milliGRAM(s) Oral daily    MEDICATIONS  (PRN):  acetaminophen    Suspension. 650 milliGRAM(s) Oral every 6 hours PRN Moderate Pain (4 - 6)  dextrose Gel 1 Dose(s) Oral once PRN Blood Glucose LESS THAN 70 milliGRAM(s)/deciliter  glucagon  Injectable 1 milliGRAM(s) IntraMuscular once PRN Glucose LESS THAN 70 milligrams/deciliter      ALLERGIES:  No Known Allergies      LABS:                        8.2    9.9   )-----------( 183      ( 13 Apr 2018 04:20 )             26.7     04-13    137  |  94<L>  |  85<H>  ----------------------------<  151<H>  4.8   |  23  |  4.46<H>    Ca    8.8      13 Apr 2018 09:09  Phos  2.8     04-13  Mg     2.0     04-13      CAPILLARY BLOOD GLUCOSE  POCT Blood Glucose.: 214 mg/dL (13 Apr 2018 23:52)  POCT Blood Glucose.: 142 mg/dL (13 Apr 2018 16:56)  POCT Blood Glucose.: 166 mg/dL (13 Apr 2018 11:42)  POCT Blood Glucose.: 102 mg/dL (13 Apr 2018 06:12)  POCT Blood Glucose.: 109 mg/dL (13 Apr 2018 05:50)        04-12-18 @ 07:01  -  04-13-18 @ 07:00  --------------------------------------------------------  IN: 820 mL / OUT: 1400 mL / NET: -580 mL    04-13-18 @ 07:01  -  04-14-18 @ 05:24  --------------------------------------------------------  IN: 2516 mL / OUT: 700 mL / NET: 1816 mL          RADIOLOGY & ADDITIONAL TESTS: Reviewed.    63M PMH HFrEF 10-15% (ischemic), MI, s/p AICD vs PPM, possible Afib, HTN, DM2 on insulin, possible CKD, and gout, who presented with a chief complaint of generalized weakness s/p septic shock likely 2/2 LE cellulitis. Now with AMS likely from brainstem infarct and/or toxic metabolic encephalopathy, now with candidemia (resolving) and sepsis 2/2 GNR bacteremia, Ucx + klebsiella.     NEURO  #AMS  - Pt non responsive since 3/22. Vital signs normal during the event. Pt intubated for airway protection. Stroke code called. CT, CTA negative. VEEG showed moderate slowing but no seizure activity. Patients mental status worsening/ not improving likely 2/2 to underlying infection. No spontaneous movement of LUE. Neuro exam unchanged.   - Repeat CT head 4/11 performed due to concern for not moving LUE, however only showing only chronic infarctions.   - MRI (from 3/26) read addendum showing  C3/C4 level there is a an approximately 1.5 cm long extrusion coursing superiorly to the C2/C3 contacting the ventral spinal cord- Per neurology, this may be contributing to weakness, however would not explain change in mental status. 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 Per neuro, event could have resulted from hypoperfusion.   - C/w Modafinil  - Unclear neuro prognosis at this point.   - S/p PEG/trach on 4/4    ID  #Candidemia with candida tropicalis: +SIRS- Bandemia  and fever. Blood cultures positive for candida tropicalis on 4/7 and again on 4/9. Source unclear at this time.   - S/p Micafungin 100mg q 24hrs (4/7-4/12)  - Fluconazole 200mg q 24hrs (4/13--)  - CTAP 4/11- Negative for abscess  - Surveillance fungal cultures 4/10 NGTD, however not finalized.   - RUKHSANA negative for vegetations/ infection of AICD  - Tunelled HD catheter removed 4/8, temp HD placed 4/10  - ID consulted, appreciate recommendations    #Sepsis- DDx includes persistent fungemia, UTI with urine culture from 4/11 growing klebsiella sensitive to ceftriaxone, and now bacteremia with GNR likely 2/2 klebsiella from UTI.   - switched from ceftriaxone to meropenem, will get ID approval today   - F/u repeat urine cx  - F/u sputum culture  - F/u repeat blood/ fungal cultures    #Septic shock 2/2 Cellulitis- Resolved  - C/w midodrine to 15 q8   - Decreased Solucortef 25mg q 24hrs given hyperglycemia/ infection, patient on for hypotension borderline ACTH test  - S/p zosyn for suspected UTI (3/25-3/26)  s/p Zosyn (3/11-2/21). Was previously also on Vanco (3/11-3/17).   - RUQ doppler shows no portal vein thrombosis. given worsening transaminitis and increased residuals RUQ sono was obtained which only shows cholelithiasis.   - TTE with no vegetations. Pt not stable for RUKHSANA.   - Gallium scan showed LLE cellulitis.     - repeat LE CT does not show abscess or gas  - HIV negative  - Vascular surgery on board. Recommending Amputation as an option if pt continues to be unstable.     #UTI- urine culture from 4/11 growing klebsiella sensitive to ceftriaxone, and now bacteremia with GNR likely 2/2 klebsiella from UTI.   - S/p zosyn 3/25-3/26 and Ceftriaxone   - now on meropenem for GNR bacteremia likely 2/2 UTI    CARDIOVASCULAR   # HYPOTENSION-   - c/w Midodrine 15 mg q8h to maintain SBP>65  - Solumedrol decreased as above   - Levophed PRN to maintain MAP >65  - CHF with severely reduced EF 15%, caution with fluids. Per renal recs, can give IV albumin for fluids.    # CHF exacerbation- CHF with EF 10-15% per pt 2/2 ischemic cardiomyopathy s/p AICD. Elevated BNP on admission with R sided effusion and edema  - persistent pulmonary vascular congestion on CXR with layering pleural effusions.   - Maintain negative fluid balance with dialysis, watching BP  - Unclear medical regimen opt. Per [facility] pharmacy ( and [place]) pt has not picked up Torsemide 20 or Metoprolol 100 since November.   - Hold ACE/Metoprolol in setting of sepsis/ hypotension    #AFIB- hx of Afib and LV thrombus on coumadin.    - Holding Metoprolol 12.5 q12h given hypotension. Will use Dig for rate control if RVR   - Holding coumadin given thrombocytopenia, will consider starting when plts stable/ uptrending  - S/p heparin gtt, discontinued 2/2 thrombocytopenia concern for HIT.  - TTE with affinity shows no  LV thrombus currently    #CAD- Hx of MI s/p AICD  - Pt with pLAD in 7/2017, was started on Aspirin and Brilinta per records.  - Holding asp 81, coumadin and atorvastatin (discontinued 2/2 LFT uptrend, thrombocytopenia).     #LE Edema   - LE edema with chronic venous stasis.   - Duplex does not show DVT    PULMONARY   #Acute Resp failure- S/p tracheostomy 4/5. Back on vent overnight with CPAP trial this morning. CXR today with worsened opacities on L and R. Bedside US with worsened simple b/l pleural effusions and atelectatic lung. Less concern for infectious source given b/l effusions with no septations/loculations. Likely related to fluid overload. If clinically worsening, can consider thoracentesis for fluid studies and cultures.  -Continue CPAP trials, weaning to trach collar as tolerated.       RENAL   #ARF- likely ischemic ATN in setting of sepsis with low intravascular volume. Unknown baseline Cr presenting with Cr 3.93 with metabolic derangements. Renal team on board, pt with R HD catheter placed by IR on 3/21. Now on intermittent HD. HD catheter removed 4/8 due to fungemia, temporary HD catheter placed 4/10.    -Last dialyzed 4/12, f/u daily renal recs, no dialysis today  -Permacath replaced 4/12 (Cultures clear x 48hrs, approved by ID)  - c/w intermittent HD per renal recs   - retroperitoneal ultrasound showing medical renal disease.     #Elevated AG- AG 21, lactate negative, glucose has been well controlled, possibly 2/2 uremia in setting of ESRD.  -F/u repeat BMP    #Hyperkalemia- PT with elevated K to 5.7 on admission,  no EKG changes.  - Continue telemetry monitoring  - Trend K   - c/w dialysis   - Kayexalate PRN    #Metabolic acidosis   - 2/2 Lactate, starvation ketoacidosis and uremia   - resolved     GI   # transaminitis and elevated bili- Patient with transaminitis that has remained stable, however with new elevation in Bili to 2.1 today. Prior RUQ US with cholelithiasis and prior CT abdomen/pelvis consistent with cholelithiasis and ascites.   - Repeat RUQ US negative fo cholecysitis, rec HIDA if strong clinical suspicion.     HEME  #Thrombocytopenia- ELVIRA negative but PF4 was positive. Unlikely HIT. Most likely 2/2 zosyn or sepsis   - Continue to hold coumadin and Aspirin  - Monitor for signs of bleeding   - transfuse if <10 or <50 bleeding    #Elevated INR. Unclear etiology.   - Holding coumadin given thrombocytopenia  - Given Vit K and FFP3/12. FFP 3/13, FFP 4/4  - Monitor coags    #Anemia- Likely 2/2 phlebotomy and CKD, no signs of bleeding. Prior studies showed Anemia of chronic disease.  -Continue to monitor  -s/p 1pRBC on 3/12, 3/16 and 4/4    ENDOCRINE   - Lantus 12U and humalin 7units TID   - MISS  - monitor FSG and adjust as needed   - A1C 8.6%      F: No IVF   E: Replete K<4 Mg<2, caution in setting of acute renal failure  N: PEG Placed 4/4, 1.5 Glucerna      Lines: R dialysis permacath (4/12)  Drips: Levophed  Full code  Dispo: MICU  Ppx: PPI, no heparin/ coumadin given thrombocytopenia PGY1 PROGRESS NOTE:    OVERNIGHT EVENTS: Levo requirements increasing. PTT in therapeutic range at 10pm, 1 am and 7pm. Sodium within goal of 150-154, kept D5W infusion at same rate. Changed lantus to 10units from 20units for FS 74 at 10pm. Repleted K.    SUBJECTIVE / INTERVAL HPI: Patient seen and examined at bedside. Patient alert and awake. Intermittently tracking with eyes and minimally following commands. Nodded head to a few questions.    VITAL SIGNS:  Vital Signs Last 24 Hrs  T(C): 36.8 (14 Apr 2018 01:00), Max: 38.4 (13 Apr 2018 06:20)  T(F): 98.3 (14 Apr 2018 01:00), Max: 101.1 (13 Apr 2018 06:20)  HR: 104 (14 Apr 2018 05:00) (88 - 110)  BP: 95/68 (14 Apr 2018 05:00) (77/57 - 98/72)  BP(mean): 87 (14 Apr 2018 03:33) (63 - 87)  RR: 21 (14 Apr 2018 05:00) (10 - 31)  SpO2: 100% (14 Apr 2018 05:00) (94% - 100%)    PHYSICAL EXAM:  General: Awake, uncomfortable appearing. Responding to questions intermittently with head nods. NAD.   HEENT: NC/AT; pupils equal, clear conjunctiva  Neck: +Trach in place, w/ small amount of yellowish secretions  Respiratory: Bilateral rhonchi, no wheezing. Non-labored breathing on ventilator.   Cardiovascular: +S1/S2; RRR, no m/r/g  Abdomen: soft, NT/ND; +BS  Extremities: WWP, 2+ peripheral pulses b/l; 2+ LE dependent edema  Skin: normal color and turgor; no rash  Neurological: Awake, able to nod to questions. Movement of RUE spontaneously No movement of LUE, b/l LE. Does not withdraw to pain in L arm. Grimaces to pain in R arm. All extremities rigid.    MEDICATIONS:  MEDICATIONS  (STANDING):  chlorhexidine 0.12% Liquid 15 milliLiter(s) Swish and Spit two times a day  chlorhexidine 2% Cloths 1 Application(s) Topical daily  dextrose 5%. 1000 milliLiter(s) (50 mL/Hr) IV Continuous <Continuous>  dextrose 50% Injectable 12.5 Gram(s) IV Push once  dextrose 50% Injectable 25 Gram(s) IV Push once  dextrose 50% Injectable 25 Gram(s) IV Push once  ergocalciferol Drops 6000 Unit(s) Oral daily  escitalopram 5 milliGRAM(s) Oral daily  fluconAZOLE IVPB 200 milliGRAM(s) IV Intermittent every 24 hours  folic acid 1 milliGRAM(s) Oral daily  hydrocortisone sodium succinate Injectable 25 milliGRAM(s) IV Push every 24 hours  insulin glargine Injectable (LANTUS) 12 Unit(s) SubCutaneous at bedtime  insulin regular  human corrective regimen sliding scale   SubCutaneous every 6 hours  insulin regular  human recombinant 7 Unit(s) SubCutaneous every 6 hours  meropenem  IVPB 500 milliGRAM(s) IV Intermittent every 12 hours  meropenem  IVPB 500 milliGRAM(s) IV Intermittent every 12 hours  midodrine 15 milliGRAM(s) Oral every 8 hours  modafinil 100 milliGRAM(s) Oral <User Schedule>  multivitamin 1 Tablet(s) Oral daily  norepinephrine Infusion 0.01 MICROgram(s)/kG/Min (1.5 mL/Hr) IV Continuous <Continuous>  pantoprazole   Suspension 40 milliGRAM(s) Oral daily  thiamine 100 milliGRAM(s) Oral daily    MEDICATIONS  (PRN):  acetaminophen    Suspension. 650 milliGRAM(s) Oral every 6 hours PRN Moderate Pain (4 - 6)  dextrose Gel 1 Dose(s) Oral once PRN Blood Glucose LESS THAN 70 milliGRAM(s)/deciliter  glucagon  Injectable 1 milliGRAM(s) IntraMuscular once PRN Glucose LESS THAN 70 milligrams/deciliter      ALLERGIES:  No Known Allergies      LABS:                                     8.8    11.5  )-----------( 284      ( 14 Apr 2018 07:15 )             28.5     04-14    140  |  101  |  97<H>  ----------------------------<  92  4.9   |  24  |  5.10<H>    Ca    9.7      14 Apr 2018 07:15  Phos  3.1     04-14  Mg     2.2     04-14      CAPILLARY BLOOD GLUCOSE  POCT Blood Glucose.: 148 mg/dL (14 Apr 2018 12:33)  POCT Blood Glucose.: 87 mg/dL (14 Apr 2018 08:49)  POCT Blood Glucose.: 57 mg/dL (14 Apr 2018 06:26)  POCT Blood Glucose.: 64 mg/dL (14 Apr 2018 06:24)  POCT Blood Glucose.: 214 mg/dL (13 Apr 2018 23:52)  POCT Blood Glucose.: 142 mg/dL (13 Apr 2018 16:56)        04-13-18 @ 07:01  -  04-14-18 @ 07:00  --------------------------------------------------------  IN: 2726 mL / OUT: 700 mL / NET: 2026 mL    04-14-18 @ 07:01  -  04-14-18 @ 15:57  --------------------------------------------------------  IN: 410 mL / OUT: 500 mL / NET: -90 mL      RADIOLOGY & ADDITIONAL TESTS: Reviewed.    Assessment/Plan:  63M PMH HFrEF 10-15% (ischemic), MI, s/p AICD vs PPM, possible Afib, HTN, DM2 on insulin, possible CKD, and gout, who presented with a chief complaint of generalized weakness s/p septic shock likely 2/2 LE cellulitis. Now with AMS likely from brainstem infarct and/or toxic metabolic encephalopathy, now with candidemia (resolving) and sepsis 2/2 GNR bacteremia, Ucx + klebsiella.     NEURO  #AMS- Patient with decline in mental status and now minimally responsive since 3/22. Now intubated for airway protection. CT, CTA negative. VEEG showed moderate slowing but no seizure activity. Decline in mental status likely 2/2 to underlying infection vs acute neurologic event.   - Repeat CT head 4/11 performed due to concern for not moving LUE, however only showing only chronic infarctions.   - MRI (from 3/26) read addendum showing  C3/C4 level there is a an approximately 1.5 cm long extrusion coursing superiorly to the C2/C3 contacting the ventral spinal cord- Per neurology, this may be contributing to weakness, however would not explain change in mental status. 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 Per neuro, event could have resulted from hypoperfusion.   - C/w Modafinil  - S/p PEG/trach on 4/4  - Unclear neuro prognosis at this point.     ID  #Candidemia with candida tropicalis- Blood cultures positive for candida tropicalis on 4/7 and again on 4/9. Source unclear at this time.   - S/p Micafungin 100mg q 24hrs (4/7-4/12)  - c/w Fluconazole 200mg q 24hrs (4/13--)  - CTAP 4/11- Negative for abscess  - Surveillance fungal cultures 4/10 NGTD  - RUKHSANA negative for vegetations/ infection of AICD  - Tunelled HD catheter removed 4/8, temp HD placed 4/10  - ID following    #Klebsiella Bacteremia- now bacteremic with GNR likely 2/2 klebsiella from UTI.   - s/p one dose of Zosyn on 4/13 AM, then switched to meropenem yesterday with concern of bacterial resistance   - c/w Zosyn 2.25g q6hrs (4/14-), low concern for resistance given that last course of zosyn was March 26th  - awaiting bcx sensitivies  - surveillance blood cultures today  - f/u ID about need for replacement of permacath given + blood cultures; was placed after given one dose of Zosyn    #Septic shock 2/2 Cellulitis- Resolved  - C/w midodrine to 15 q8   - discontinued Solucortef   - S/p zosyn for suspected UTI (3/25-3/26),  s/p Zosyn (3/11-2/21)  - RUQ sono doppler shows no portal vein thrombosis, only cholelithiasis  - TTE with no vegetations. Pt not stable for RUKHSANA.   - Gallium scan showed LLE cellulitis.     - repeat LE CT does not show abscess or gas  - HIV negative  - Vascular surgery on board. Recommending Amputation as an option if pt continues to be unstable.     #UTI- urine culture from 4/11 growing klebsiella sensitive to ceftriaxone, and now bacteremia with GNR likely 2/2 klebsiella from UTI.   - S/p zosyn 3/25-3/26 and Ceftriaxone   - now on Zosyn for GNR bacteremia likely 2/2 UTI    CARDIOVASCULAR   # HYPOTENSION-   - c/w Midodrine 15 mg q8h to maintain SBP>65  - Solumedrol discontinued today  - Levophed PRN to maintain MAP >65  - CHF with severely reduced EF 15%, caution with fluids. Per renal recs, can give IV albumin for fluids.    # CHF exacerbation- CHF with EF 10-15% per pt 2/2 ischemic cardiomyopathy s/p AICD. Elevated BNP on admission with R sided effusion and edema  - persistent pulmonary vascular congestion on CXR with layering pleural effusions.   - Maintain negative fluid balance with dialysis, watching BP  - Unclear medical regimen opt. Per [facility] pharmacy ( and [place]) pt has not picked up Torsemide 20 or Metoprolol 100 since November.   - Hold ACE/Metoprolol in setting of sepsis/ hypotension    #AFIB- hx of Afib and LV thrombus on coumadin.    - Holding Metoprolol 12.5 q12h given hypotension. Will use Dig for rate control if RVR   - Holding coumadin given thrombocytopenia, will consider starting when plts stable/ uptrending  - S/p heparin gtt, discontinued 2/2 thrombocytopenia concern for HIT.  - TTE with affinity shows no  LV thrombus currently    #CAD- Hx of MI s/p AICD  - Pt with pLAD in 7/2017, was started on Aspirin and Brilinta per records.  - Holding asp 81, coumadin and atorvastatin (discontinued 2/2 LFT uptrend, thrombocytopenia).     #LE Edema   - LE edema with chronic venous stasis.   - Duplex does not show DVT    PULMONARY   #Acute Resp failure- S/p tracheostomy 4/5. Back on vent overnight with CPAP trial this morning. Bedside US with worsened simple b/l pleural effusions and atelectatic lung. Less concern for infectious source given b/l effusions with no septations/loculations. Likely related to fluid overload. If clinically worsening, can consider thoracentesis for fluid studies and cultures.  - c/w daily CPAP trials, weaning to trach collar as tolerated  - c/w HD to remove excess fluid      RENAL   #ARF- likely ischemic ATN in setting of sepsis with low intravascular volume. Unknown baseline Cr presenting with Cr 3.93 with metabolic derangements. Renal team on board, pt with R HD catheter placed by IR on 3/21. Now on intermittent HD. HD catheter removed 4/8 due to fungemia, temporary HD catheter placed 4/10.    - Last dialyzed 4/12, plan for HD today  - c/w intermittent HD per renal recs   - retroperitoneal ultrasound showing medical renal disease.     #Elevated AG- AG 21, lactate negative, glucose has been well controlled, possibly 2/2 uremia in setting of ESRD.  - F/u repeat BMP    #Hyperkalemia- PT with elevated K to 5.7 on admission,  no EKG changes.  - Continue telemetry monitoring  - Trend K   - c/w dialysis   - Kayexalate PRN    #Metabolic acidosis   - 2/2 Lactate, starvation ketoacidosis and uremia   - resolved     GI   # transaminitis and elevated bili- Patient with transaminitis that has remained stable, however with new elevation in Bili to 2.1 today. Prior RUQ US with cholelithiasis and prior CT abdomen/pelvis consistent with cholelithiasis and ascites.   - Repeat RUQ US negative fo cholecystitis rec HIDA if strong clinical suspicion.     HEME  #Thrombocytopenia- ELVIRA negative but PF4 was positive. Unlikely HIT. Most likely 2/2 zosyn or sepsis   - Continue to hold coumadin and Aspirin  - Monitor for signs of bleeding   - transfuse if <10 or <50 bleeding    #Elevated INR. Unclear etiology.   - Holding coumadin given thrombocytopenia  - Given Vit K and FFP3/12. FFP 3/13, FFP 4/4  - Monitor coags    #Anemia- Likely 2/2 phlebotomy and CKD, no signs of bleeding. Prior studies showed Anemia of chronic disease.  -Continue to monitor  -s/p 1pRBC on 3/12, 3/16 and 4/4    ENDOCRINE   - Lantus 10U and humalin 7units TID   - MISS  - monitor FSG and adjust as needed   - A1C 8.6%      F: No IVF   E: Replete K<4 Mg<2, caution in setting of acute renal failure  N: PEG Placed 4/4, 1.5 Glucerna    DVT ppx: HSQ  GI ppx: PPI 40 daily  Lines: R dialysis permacath (4/12)  Drips: Levophed  Full code  Dispo: MICU  Ppx: PPI, no heparin/ coumadin given thrombocytopenia

## 2018-04-14 NOTE — PROGRESS NOTE ADULT - SUBJECTIVE AND OBJECTIVE BOX
Patient seen and examined at bedside.     Interval events reviewed  Levophed requirements declining to low dose at 0.029mcg/kg/min right now at time of examinatino  He is on trach vent.   Does not give history right now  Chems reviewed  CXR reviewed - possibly increasing infiltrates  VOlume balance net positive 2L over the past day but insensible losses may have kept him near neutral.     acetaminophen    Suspension. 650 milliGRAM(s) every 6 hours PRN  albumin human 25% IVPB 50 milliLiter(s) every 1 hour  chlorhexidine 0.12% Liquid 15 milliLiter(s) two times a day  chlorhexidine 2% Cloths 1 Application(s) daily  dextrose 5%. 1000 milliLiter(s) <Continuous>  dextrose 50% Injectable 12.5 Gram(s) once  dextrose 50% Injectable 25 Gram(s) once  dextrose 50% Injectable 25 Gram(s) once  dextrose Gel 1 Dose(s) once PRN  ergocalciferol Drops 6000 Unit(s) daily  escitalopram 5 milliGRAM(s) daily  fluconAZOLE IVPB 200 milliGRAM(s) every 24 hours  folic acid 1 milliGRAM(s) daily  glucagon  Injectable 1 milliGRAM(s) once PRN  hydrocortisone sodium succinate Injectable 25 milliGRAM(s) every 24 hours  insulin glargine Injectable (LANTUS) 12 Unit(s) at bedtime  insulin regular  human corrective regimen sliding scale   every 6 hours  meropenem  IVPB 500 milliGRAM(s) every 12 hours  midodrine 15 milliGRAM(s) every 8 hours  modafinil 100 milliGRAM(s) <User Schedule>  multivitamin 1 Tablet(s) daily  norepinephrine Infusion 0.01 MICROgram(s)/kG/Min <Continuous>  pantoprazole   Suspension 40 milliGRAM(s) daily  thiamine 100 milliGRAM(s) daily      Allergies    No Known Allergies    Intolerances        T(C): , Max: 37.9 (04-14-18 @ 07:00)  T(F): , Max: 100.2 (04-14-18 @ 07:00)  HR: 102 (04-14-18 @ 08:44)  BP: 88/54 (04-14-18 @ 07:01)  BP(mean): 90 (04-14-18 @ 07:36)  RR: 20 (04-14-18 @ 08:44)  SpO2: 100% (04-14-18 @ 08:44)  Wt(kg): --    04-13 @ 07:01  -  04-14 @ 07:00  --------------------------------------------------------  IN:    Enteral Tube Flush: 800 mL    norepinephrine Infusion: 126 mL    Solution: 100 mL    Solution: 500 mL    TwoCal HN: 1200 mL  Total IN: 2726 mL    OUT:    Rectal Tube: 700 mL  Total OUT: 700 mL    Total NET: 2026 mL      04-14 @ 07:01  -  04-14 @ 08:49  --------------------------------------------------------  IN:    norepinephrine Infusion: 6 mL    TwoCal HN: 50 mL  Total IN: 56 mL    OUT:  Total OUT: 0 mL    Total NET: 56 mL              LABS:                        8.8    11.5  )-----------( 284      ( 14 Apr 2018 07:15 )             28.5     04-14    140  |  101  |  97<H>  ----------------------------<  92  4.9   |  24  |  5.10<H>    Ca    9.7      14 Apr 2018 07:15  Phos  3.1     04-14  Mg     2.2     04-14                  RADIOLOGY & ADDITIONAL STUDIES:

## 2018-04-14 NOTE — PROGRESS NOTE ADULT - ATTENDING COMMENTS
seen on HD with , agree with above  tolerating rx-on low dose levo-- VSS  cont HD as above mainly for clearance-- minimal UF

## 2018-04-14 NOTE — PROGRESS NOTE ADULT - SUBJECTIVE AND OBJECTIVE BOX
INTERVAL HPI/OVERNIGHT EVENTS: Defervesced. On pressor (getting HD at time). Now with Klebsiella bacteremia. No significant tracheal secretions.     ROS: UTO      ANTIBIOTICS/RELEVANT:    MEDICATIONS  (STANDING):  chlorhexidine 0.12% Liquid 15 milliLiter(s) Swish and Spit two times a day  chlorhexidine 2% Cloths 1 Application(s) Topical daily  dextrose 5%. 1000 milliLiter(s) (50 mL/Hr) IV Continuous <Continuous>  dextrose 50% Injectable 12.5 Gram(s) IV Push once  dextrose 50% Injectable 25 Gram(s) IV Push once  dextrose 50% Injectable 25 Gram(s) IV Push once  ergocalciferol Drops 6000 Unit(s) Oral daily  escitalopram 5 milliGRAM(s) Oral daily  fluconAZOLE IVPB 200 milliGRAM(s) IV Intermittent every 24 hours  folic acid 1 milliGRAM(s) Oral daily  heparin  Injectable 5000 Unit(s) SubCutaneous every 8 hours  insulin glargine Injectable (LANTUS) 12 Unit(s) SubCutaneous at bedtime  insulin regular  human corrective regimen sliding scale   SubCutaneous every 6 hours  midodrine 15 milliGRAM(s) Oral every 8 hours  modafinil 100 milliGRAM(s) Oral <User Schedule>  multivitamin 1 Tablet(s) Oral daily  norepinephrine Infusion 0.01 MICROgram(s)/kG/Min (1.5 mL/Hr) IV Continuous <Continuous>  pantoprazole   Suspension 40 milliGRAM(s) Oral daily  piperacillin/tazobactam IVPB. 2.25 Gram(s) IV Intermittent every 6 hours  thiamine 100 milliGRAM(s) Oral daily    MEDICATIONS  (PRN):  acetaminophen    Suspension. 650 milliGRAM(s) Oral every 6 hours PRN Moderate Pain (4 - 6)  dextrose Gel 1 Dose(s) Oral once PRN Blood Glucose LESS THAN 70 milliGRAM(s)/deciliter  glucagon  Injectable 1 milliGRAM(s) IntraMuscular once PRN Glucose LESS THAN 70 milligrams/deciliter        Vital Signs Last 24 Hrs  T(C): 37.3 (14 Apr 2018 14:15), Max: 37.9 (14 Apr 2018 07:00)  T(F): 99.1 (14 Apr 2018 14:15), Max: 100.2 (14 Apr 2018 07:00)  HR: 97 (14 Apr 2018 14:15) (92 - 110)  BP: 95/71 (14 Apr 2018 14:15) (76/66 - 98/72)  BP(mean): 79 (14 Apr 2018 14:15) (71 - 90)  RR: 15 (14 Apr 2018 14:15) (11 - 31)  SpO2: 100% (14 Apr 2018 14:15) (94% - 100%)    PHYSICAL EXAM:  Constitutional:Well-developed, well nourished  Ear/Nose/Throat: no oral lesion, no sinus tenderness on percussion	  Neck:no JVD, no lymphadenopathy, supple  Respiratory: CTA morris, +trach  Cardiovascular: S1S2 RRR, no murmurs  Gastrointestinal:soft, (+) BS, no HSM  Extremities:no e/e/c  Vascular: DP Pulse:	right normal; left normal      LABS:                        8.8    11.5  )-----------( 284      ( 14 Apr 2018 07:15 )             28.5     04-14    140  |  101  |  97<H>  ----------------------------<  92  4.9   |  24  |  5.10<H>    Ca    9.7      14 Apr 2018 07:15  Phos  3.1     04-14  Mg     2.2     04-14            MICROBIOLOGY: Culture - Sputum . (04.13.18 @ 10:35)    Gram Stain:   Rare epithelial cells  Numerous White blood cells  Numerous Gram Positive Rods  Few Gram Positive Cocci in Pairs and Chains    Specimen Source: .Sputum Sputum    Culture Results:   Numerous Gram Negative Rods  Identification and susceptibility to follow.  Culture in progress    Culture - Blood (04.13.18 @ 08:01)    Gram Stain:   Anaerobic Bottle: Gram Negative Rods  Result called to and read back by_ Ms. Bryanna Nolasco RN([id number])  04/13/2018 18:06:06  "Due to technical problems, Proteus sp. will Not be reported as part of  the BCID panel until further notice"  ***Blood PanelPCR results on this specimen are available  approximately 3 hours after the Gram stain result.***  Gram stain, PCR, and/or culture results may not always  correspond due to difference in methodologies.  ************************************************************  This PCR assay was performed using [employer].  The following targets are tested for: Enterococcus,  vancomycin resistant enterococci, Listeria monocytogenes,  coagulase negative staphylococci, S. aureus,  methicillin resistant S. aureus, Streptococcus agalactiae  (Group B), S. pneumoniae, S. pyogenes (Group A),  Acinetobacter baumannii, Enterobacter cloacae, E. coli,  Klebsiella oxytoca, K. pneumoniae, Proteus sp.,  Serratia marcescens, Haemophilus influenzae,  Neisseria meningitidis, Pseudomonas aeruginosa, Candida  albicans, C. glabrata, C krusei, C parapsilosis,  C. tropicalis and the KPC resistance gene.  Aerobic Bottle: Gram Negative Rods  Result called to and read back by_ ELISABET Nichols RN  04/14/2018 09:25:14    -  Klebsiella pneumoniae: Detec    Specimen Source: .Blood Blood    Organism: Blood Culture PCR    Culture Results:   Growth in anaerobic bottle: Gram Negative Rods  Identification and susceptibility to follow.  Culture in progress    Organism Identification: Blood Culture PCR    Method Type: PCR        RADIOLOGY & ADDITIONAL STUDIES: reviewed

## 2018-04-14 NOTE — PROGRESS NOTE ADULT - ASSESSMENT
C. tropicalis BSI likely 2/2 PC s/p removal 4/8 with subsequent clearance of fungemia. Negative RUKHSANA and ophtho eval. R IJ Palindrome HD catheter placed 4/12. Now with Klebsiella BSI 2/2 UTI. Continue fluconazole for candidemia. Continue Zosyn for now--suspect blood isolate will have same susceptibility pattern as urine isolate--can narrow once results known.

## 2018-04-15 LAB
-  AMPICILLIN/SULBACTAM: SIGNIFICANT CHANGE UP
-  AMPICILLIN: SIGNIFICANT CHANGE UP
-  CEFAZOLIN: SIGNIFICANT CHANGE UP
-  CEFTRIAXONE: SIGNIFICANT CHANGE UP
-  CIPROFLOXACIN: SIGNIFICANT CHANGE UP
-  GENTAMICIN: SIGNIFICANT CHANGE UP
-  PIPERACILLIN/TAZOBACTAM: SIGNIFICANT CHANGE UP
-  TOBRAMYCIN: SIGNIFICANT CHANGE UP
-  TRIMETHOPRIM/SULFAMETHOXAZOLE: SIGNIFICANT CHANGE UP
ANION GAP SERPL CALC-SCNC: 17 MMOL/L — SIGNIFICANT CHANGE UP (ref 5–17)
ANION GAP SERPL CALC-SCNC: 18 MMOL/L — HIGH (ref 5–17)
APTT BLD: 34.2 SEC — SIGNIFICANT CHANGE UP (ref 27.5–37.4)
BUN SERPL-MCNC: 66 MG/DL — HIGH (ref 7–23)
BUN SERPL-MCNC: 68 MG/DL — HIGH (ref 7–23)
CALCIUM SERPL-MCNC: 9 MG/DL — SIGNIFICANT CHANGE UP (ref 8.4–10.5)
CALCIUM SERPL-MCNC: 9 MG/DL — SIGNIFICANT CHANGE UP (ref 8.4–10.5)
CHLORIDE SERPL-SCNC: 102 MMOL/L — SIGNIFICANT CHANGE UP (ref 96–108)
CHLORIDE SERPL-SCNC: 97 MMOL/L — SIGNIFICANT CHANGE UP (ref 96–108)
CO2 SERPL-SCNC: 24 MMOL/L — SIGNIFICANT CHANGE UP (ref 22–31)
CO2 SERPL-SCNC: 25 MMOL/L — SIGNIFICANT CHANGE UP (ref 22–31)
CREAT SERPL-MCNC: 3.7 MG/DL — HIGH (ref 0.5–1.3)
CREAT SERPL-MCNC: 3.9 MG/DL — HIGH (ref 0.5–1.3)
CULTURE RESULTS: SIGNIFICANT CHANGE UP
GLUCOSE BLDC GLUCOMTR-MCNC: 133 MG/DL — HIGH (ref 70–99)
GLUCOSE BLDC GLUCOMTR-MCNC: 137 MG/DL — HIGH (ref 70–99)
GLUCOSE BLDC GLUCOMTR-MCNC: 195 MG/DL — HIGH (ref 70–99)
GLUCOSE BLDC GLUCOMTR-MCNC: 255 MG/DL — HIGH (ref 70–99)
GLUCOSE BLDC GLUCOMTR-MCNC: 305 MG/DL — HIGH (ref 70–99)
GLUCOSE SERPL-MCNC: 136 MG/DL — HIGH (ref 70–99)
GLUCOSE SERPL-MCNC: 259 MG/DL — HIGH (ref 70–99)
HCT VFR BLD CALC: 26.3 % — LOW (ref 39–50)
HGB BLD-MCNC: 8 G/DL — LOW (ref 13–17)
INR BLD: 1.47 — HIGH (ref 0.88–1.16)
LYMPHOCYTES # BLD AUTO: 6 % — LOW (ref 13–44)
MAGNESIUM SERPL-MCNC: 2 MG/DL — SIGNIFICANT CHANGE UP (ref 1.6–2.6)
MCHC RBC-ENTMCNC: 29.6 PG — SIGNIFICANT CHANGE UP (ref 27–34)
MCHC RBC-ENTMCNC: 30.4 G/DL — LOW (ref 32–36)
MCV RBC AUTO: 97.4 FL — SIGNIFICANT CHANGE UP (ref 80–100)
METHOD TYPE: SIGNIFICANT CHANGE UP
MONOCYTES NFR BLD AUTO: 4 % — SIGNIFICANT CHANGE UP (ref 2–14)
NEUTROPHILS NFR BLD AUTO: 82 % — HIGH (ref 43–77)
ORGANISM # SPEC MICROSCOPIC CNT: SIGNIFICANT CHANGE UP
PHOSPHATE SERPL-MCNC: 2.2 MG/DL — LOW (ref 2.5–4.5)
PLATELET # BLD AUTO: 327 K/UL — SIGNIFICANT CHANGE UP (ref 150–400)
POTASSIUM SERPL-MCNC: 4.2 MMOL/L — SIGNIFICANT CHANGE UP (ref 3.5–5.3)
POTASSIUM SERPL-MCNC: 4.6 MMOL/L — SIGNIFICANT CHANGE UP (ref 3.5–5.3)
POTASSIUM SERPL-SCNC: 4.2 MMOL/L — SIGNIFICANT CHANGE UP (ref 3.5–5.3)
POTASSIUM SERPL-SCNC: 4.6 MMOL/L — SIGNIFICANT CHANGE UP (ref 3.5–5.3)
PROTHROM AB SERPL-ACNC: 16.4 SEC — HIGH (ref 9.8–12.7)
RBC # BLD: 2.7 M/UL — LOW (ref 4.2–5.8)
RBC # FLD: 18.8 % — HIGH (ref 10.3–16.9)
SODIUM SERPL-SCNC: 139 MMOL/L — SIGNIFICANT CHANGE UP (ref 135–145)
SODIUM SERPL-SCNC: 144 MMOL/L — SIGNIFICANT CHANGE UP (ref 135–145)
SPECIMEN SOURCE: SIGNIFICANT CHANGE UP
WBC # BLD: 13 K/UL — HIGH (ref 3.8–10.5)
WBC # FLD AUTO: 13 K/UL — HIGH (ref 3.8–10.5)

## 2018-04-15 PROCEDURE — 99232 SBSQ HOSP IP/OBS MODERATE 35: CPT | Mod: GC

## 2018-04-15 PROCEDURE — 99291 CRITICAL CARE FIRST HOUR: CPT

## 2018-04-15 PROCEDURE — 71045 X-RAY EXAM CHEST 1 VIEW: CPT | Mod: 26

## 2018-04-15 PROCEDURE — 99233 SBSQ HOSP IP/OBS HIGH 50: CPT

## 2018-04-15 RX ORDER — ACETAMINOPHEN 500 MG
650 TABLET ORAL EVERY 6 HOURS
Qty: 0 | Refills: 0 | Status: DISCONTINUED | OUTPATIENT
Start: 2018-04-15 | End: 2018-04-27

## 2018-04-15 RX ORDER — MODAFINIL 200 MG/1
100 TABLET ORAL
Qty: 0 | Refills: 0 | Status: DISCONTINUED | OUTPATIENT
Start: 2018-04-15 | End: 2018-04-20

## 2018-04-15 RX ORDER — CEFTRIAXONE 500 MG/1
2 INJECTION, POWDER, FOR SOLUTION INTRAMUSCULAR; INTRAVENOUS EVERY 24 HOURS
Qty: 0 | Refills: 0 | Status: DISCONTINUED | OUTPATIENT
Start: 2018-04-15 | End: 2018-04-16

## 2018-04-15 RX ORDER — INSULIN GLARGINE 100 [IU]/ML
10 INJECTION, SOLUTION SUBCUTANEOUS AT BEDTIME
Qty: 0 | Refills: 0 | Status: DISCONTINUED | OUTPATIENT
Start: 2018-04-15 | End: 2018-04-17

## 2018-04-15 RX ADMIN — PANTOPRAZOLE SODIUM 40 MILLIGRAM(S): 20 TABLET, DELAYED RELEASE ORAL at 11:33

## 2018-04-15 RX ADMIN — PIPERACILLIN AND TAZOBACTAM 200 GRAM(S): 4; .5 INJECTION, POWDER, LYOPHILIZED, FOR SOLUTION INTRAVENOUS at 11:34

## 2018-04-15 RX ADMIN — CHLORHEXIDINE GLUCONATE 15 MILLILITER(S): 213 SOLUTION TOPICAL at 22:16

## 2018-04-15 RX ADMIN — CHLORHEXIDINE GLUCONATE 15 MILLILITER(S): 213 SOLUTION TOPICAL at 11:34

## 2018-04-15 RX ADMIN — MIDODRINE HYDROCHLORIDE 15 MILLIGRAM(S): 2.5 TABLET ORAL at 11:33

## 2018-04-15 RX ADMIN — Medication 1 MILLIGRAM(S): at 11:33

## 2018-04-15 RX ADMIN — MIDODRINE HYDROCHLORIDE 15 MILLIGRAM(S): 2.5 TABLET ORAL at 18:10

## 2018-04-15 RX ADMIN — INSULIN GLARGINE 12 UNIT(S): 100 INJECTION, SOLUTION SUBCUTANEOUS at 01:18

## 2018-04-15 RX ADMIN — FLUCONAZOLE 100 MILLIGRAM(S): 150 TABLET ORAL at 22:15

## 2018-04-15 RX ADMIN — INSULIN HUMAN 8: 100 INJECTION, SOLUTION SUBCUTANEOUS at 01:22

## 2018-04-15 RX ADMIN — Medication 1 TABLET(S): at 11:33

## 2018-04-15 RX ADMIN — INSULIN GLARGINE 10 UNIT(S): 100 INJECTION, SOLUTION SUBCUTANEOUS at 22:16

## 2018-04-15 RX ADMIN — Medication 1.5 MICROGRAM(S)/KG/MIN: at 20:29

## 2018-04-15 RX ADMIN — MODAFINIL 100 MILLIGRAM(S): 200 TABLET ORAL at 07:00

## 2018-04-15 RX ADMIN — HEPARIN SODIUM 5000 UNIT(S): 5000 INJECTION INTRAVENOUS; SUBCUTANEOUS at 06:34

## 2018-04-15 RX ADMIN — INSULIN HUMAN 6: 100 INJECTION, SOLUTION SUBCUTANEOUS at 06:35

## 2018-04-15 RX ADMIN — MODAFINIL 100 MILLIGRAM(S): 200 TABLET ORAL at 06:36

## 2018-04-15 RX ADMIN — Medication 650 MILLIGRAM(S): at 06:26

## 2018-04-15 RX ADMIN — ESCITALOPRAM OXALATE 5 MILLIGRAM(S): 10 TABLET, FILM COATED ORAL at 11:33

## 2018-04-15 RX ADMIN — MIDODRINE HYDROCHLORIDE 15 MILLIGRAM(S): 2.5 TABLET ORAL at 04:27

## 2018-04-15 RX ADMIN — HEPARIN SODIUM 5000 UNIT(S): 5000 INJECTION INTRAVENOUS; SUBCUTANEOUS at 22:16

## 2018-04-15 RX ADMIN — Medication 100 MILLIGRAM(S): at 11:33

## 2018-04-15 RX ADMIN — HEPARIN SODIUM 5000 UNIT(S): 5000 INJECTION INTRAVENOUS; SUBCUTANEOUS at 13:07

## 2018-04-15 RX ADMIN — CEFTRIAXONE 100 GRAM(S): 500 INJECTION, POWDER, FOR SOLUTION INTRAMUSCULAR; INTRAVENOUS at 18:10

## 2018-04-15 RX ADMIN — MODAFINIL 100 MILLIGRAM(S): 200 TABLET ORAL at 13:07

## 2018-04-15 RX ADMIN — PIPERACILLIN AND TAZOBACTAM 200 GRAM(S): 4; .5 INJECTION, POWDER, LYOPHILIZED, FOR SOLUTION INTRAVENOUS at 06:27

## 2018-04-15 RX ADMIN — ERGOCALCIFEROL 6000 UNIT(S): 1.25 CAPSULE ORAL at 11:34

## 2018-04-15 NOTE — PROGRESS NOTE ADULT - SUBJECTIVE AND OBJECTIVE BOX
Patient seen and examined at bedside.     Interval events reviewed  HD was successful yesterday with net UF of 500mL  He remains net positive for the last 24 hours but that may be appropriate      Unable to obtain history from patient  Chemistries reviewed and appropriate   Levophed dosage has increased since yesterday afternoon    acetaminophen    Suspension 650 milliGRAM(s) every 6 hours PRN  acetaminophen    Suspension. 650 milliGRAM(s) every 6 hours PRN  chlorhexidine 0.12% Liquid 15 milliLiter(s) two times a day  chlorhexidine 2% Cloths 1 Application(s) daily  dextrose 5%. 1000 milliLiter(s) <Continuous>  dextrose 50% Injectable 12.5 Gram(s) once  dextrose 50% Injectable 25 Gram(s) once  dextrose 50% Injectable 25 Gram(s) once  dextrose Gel 1 Dose(s) once PRN  ergocalciferol Drops 6000 Unit(s) daily  escitalopram 5 milliGRAM(s) daily  fluconAZOLE IVPB 200 milliGRAM(s) every 24 hours  folic acid 1 milliGRAM(s) daily  glucagon  Injectable 1 milliGRAM(s) once PRN  heparin  Injectable 5000 Unit(s) every 8 hours  insulin glargine Injectable (LANTUS) 12 Unit(s) at bedtime  insulin regular  human corrective regimen sliding scale   every 6 hours  midodrine 15 milliGRAM(s) every 8 hours  modafinil 100 milliGRAM(s) <User Schedule>  multivitamin 1 Tablet(s) daily  norepinephrine Infusion 0.01 MICROgram(s)/kG/Min <Continuous>  pantoprazole   Suspension 40 milliGRAM(s) daily  piperacillin/tazobactam IVPB. 2.25 Gram(s) every 6 hours  thiamine 100 milliGRAM(s) daily      Allergies    No Known Allergies    Intolerances        T(C): , Max: 38.2 (04-15-18 @ 06:10)  T(F): , Max: 100.8 (04-15-18 @ 06:10)  HR: 94 (04-15-18 @ 09:00)  BP: 71/58 (04-15-18 @ 09:00)  BP(mean): 63 (04-15-18 @ 09:00)  RR: 15 (04-15-18 @ 09:00)  SpO2: 100% (04-15-18 @ 09:00)  Wt(kg): --    04-14 @ 07:01  -  04-15 @ 07:00  --------------------------------------------------------  IN:    Enteral Tube Flush: 120 mL    norepinephrine Infusion: 154 mL    Solution: 400 mL    TwoCal HN: 1100 mL  Total IN: 1774 mL    OUT:    Other: 500 mL  Total OUT: 500 mL    Total NET: 1274 mL      04-15 @ 07:01  -  04-15 @ 10:08  --------------------------------------------------------  IN:    norepinephrine Infusion: 16 mL    TwoCal HN: 100 mL  Total IN: 116 mL    OUT:  Total OUT: 0 mL    Total NET: 116 mL              LABS:                        8.0    13.0  )-----------( 327      ( 15 Apr 2018 06:03 )             26.3     04-15    139  |  97  |  68<H>  ----------------------------<  259<H>  4.6   |  24  |  3.70<H>    Ca    9.0      15 Apr 2018 06:03  Phos  2.2     04-15  Mg     2.0     04-15                  RADIOLOGY & ADDITIONAL STUDIES:

## 2018-04-15 NOTE — PROGRESS NOTE ADULT - SUBJECTIVE AND OBJECTIVE BOX
INTERVAL HPI/OVERNIGHT EVENTS:    SUBJECTIVE: Patient seen and examined at bedside.    OBJECTIVE:    VITAL SIGNS:  ICU Vital Signs Last 24 Hrs  T(C): 38.1 (15 Apr 2018 01:28), Max: 38.1 (15 Apr 2018 01:28)  T(F): 100.5 (15 Apr 2018 01:28), Max: 100.5 (15 Apr 2018 01:28)  HR: 100 (15 Apr 2018 04:00) (96 - 110)  BP: 84/64 (15 Apr 2018 04:00) (76/66 - 99/78)  BP(mean): 71 (15 Apr 2018 04:00) (64 - 90)  ABP: --  ABP(mean): --  RR: 14 (15 Apr 2018 04:00) (12 - 69)  SpO2: 100% (15 Apr 2018 04:00) (94% - 100%)    Mode: AC/ CMV (Assist Control/ Continuous Mandatory Ventilation), RR (machine): 10, TV (machine): 500, FiO2: 40, PEEP: 5, ITime: 0.9, MAP: 8, PIP: 15    04-13 @ 07:01  -  04-14 @ 07:00  --------------------------------------------------------  IN: 2726 mL / OUT: 700 mL / NET: 2026 mL    04-14 @ 07:01  -  04-15 @ 06:08  --------------------------------------------------------  IN: 1158 mL / OUT: 500 mL / NET: 658 mL      CAPILLARY BLOOD GLUCOSE      POCT Blood Glucose.: 305 mg/dL (15 Apr 2018 00:28)      PHYSICAL EXAM:    General: NAD  HEENT: NC/AT; PERRL, clear conjunctiva  Neck: supple  Respiratory: CTA b/l  Cardiovascular: +S1/S2; RRR  Abdomen: soft, NT/ND; +BS x4  Extremities: WWP, 2+ peripheral pulses b/l; no LE edema  Skin: normal color and turgor; no rash  Neurological:     MEDICATIONS:  MEDICATIONS  (STANDING):  chlorhexidine 0.12% Liquid 15 milliLiter(s) Swish and Spit two times a day  chlorhexidine 2% Cloths 1 Application(s) Topical daily  dextrose 5%. 1000 milliLiter(s) (50 mL/Hr) IV Continuous <Continuous>  dextrose 50% Injectable 12.5 Gram(s) IV Push once  dextrose 50% Injectable 25 Gram(s) IV Push once  dextrose 50% Injectable 25 Gram(s) IV Push once  ergocalciferol Drops 6000 Unit(s) Oral daily  escitalopram 5 milliGRAM(s) Oral daily  fluconAZOLE IVPB 200 milliGRAM(s) IV Intermittent every 24 hours  folic acid 1 milliGRAM(s) Oral daily  heparin  Injectable 5000 Unit(s) SubCutaneous every 8 hours  insulin glargine Injectable (LANTUS) 12 Unit(s) SubCutaneous at bedtime  insulin regular  human corrective regimen sliding scale   SubCutaneous every 6 hours  midodrine 15 milliGRAM(s) Oral every 8 hours  modafinil 100 milliGRAM(s) Oral <User Schedule>  multivitamin 1 Tablet(s) Oral daily  norepinephrine Infusion 0.01 MICROgram(s)/kG/Min (1.5 mL/Hr) IV Continuous <Continuous>  pantoprazole   Suspension 40 milliGRAM(s) Oral daily  piperacillin/tazobactam IVPB. 2.25 Gram(s) IV Intermittent every 6 hours  thiamine 100 milliGRAM(s) Oral daily    MEDICATIONS  (PRN):  acetaminophen    Suspension. 650 milliGRAM(s) Oral every 6 hours PRN Moderate Pain (4 - 6)  dextrose Gel 1 Dose(s) Oral once PRN Blood Glucose LESS THAN 70 milliGRAM(s)/deciliter  glucagon  Injectable 1 milliGRAM(s) IntraMuscular once PRN Glucose LESS THAN 70 milligrams/deciliter      ALLERGIES:  Allergies    No Known Allergies    Intolerances        LABS:                        8.8    11.5  )-----------( 284      ( 14 Apr 2018 07:15 )             28.5     04-14    140  |  101  |  97<H>  ----------------------------<  92  4.9   |  24  |  5.10<H>    Ca    9.7      14 Apr 2018 07:15  Phos  3.1     04-14  Mg     2.2     04-14            RADIOLOGY & ADDITIONAL TESTS: Reviewed. INTERVAL HPI/OVERNIGHT EVENTS: -500mL removed with dialysis yesterday. On levophed overnight (started during dialysis). FSG in 200-300s, + AG on AM labs.     SUBJECTIVE: Patient seen and examined at bedside. Uncomfortable appearing, non--verbal. ROS not obtainable.     OBJECTIVE:    VITAL SIGNS:  ICU Vital Signs Last 24 Hrs  T(C): 38.1 (15 Apr 2018 01:28), Max: 38.1 (15 Apr 2018 01:28)  T(F): 100.5 (15 Apr 2018 01:28), Max: 100.5 (15 Apr 2018 01:28)  HR: 100 (15 Apr 2018 04:00) (96 - 110)  BP: 84/64 (15 Apr 2018 04:00) (76/66 - 99/78)  BP(mean): 71 (15 Apr 2018 04:00) (64 - 90)  RR: 14 (15 Apr 2018 04:00) (12 - 69)  SpO2: 100% (15 Apr 2018 04:00) (94% - 100%)    Mode: AC/ CMV (Assist Control/ Continuous Mandatory Ventilation), RR (machine): 10, TV (machine): 500, FiO2: 40, PEEP: 5, ITime: 0.9, MAP: 8, PIP: 15    04-13 @ 07:01  -  04-14 @ 07:00  --------------------------------------------------------  IN: 2726 mL / OUT: 700 mL / NET: 2026 mL    04-14 @ 07:01  -  04-15 @ 06:08  --------------------------------------------------------  IN: 1158 mL / OUT: 500 mL / NET: 658 mL      CAPILLARY BLOOD GLUCOSE      POCT Blood Glucose.: 305 mg/dL (15 Apr 2018 00:28)      PHYSICAL EXAM:    General: Uncomfortable appearing, laying in bed, grimaces to sternal rub but not opening eyes.   HEENT: NC/AT;   Neck: supple, +Trach in place, small amount of yellow mucous  Respiratory: B/l rhonchi, no wheezing. Non-labored breathing  Cardiovascular: +S1/S2; RRR, no m/r/g  Abdomen: soft, mildly distended, grimaces to palpation, +BS, PEG in place c/d/i  Extremities: WWP, 1+ peripheral pulses b/l; 1+ LE edema.   Skin: Chronic venous stasis changes of b/l LE.   Neurological:     MEDICATIONS:  MEDICATIONS  (STANDING):  chlorhexidine 0.12% Liquid 15 milliLiter(s) Swish and Spit two times a day  chlorhexidine 2% Cloths 1 Application(s) Topical daily  dextrose 5%. 1000 milliLiter(s) (50 mL/Hr) IV Continuous <Continuous>  dextrose 50% Injectable 12.5 Gram(s) IV Push once  dextrose 50% Injectable 25 Gram(s) IV Push once  dextrose 50% Injectable 25 Gram(s) IV Push once  ergocalciferol Drops 6000 Unit(s) Oral daily  escitalopram 5 milliGRAM(s) Oral daily  fluconAZOLE IVPB 200 milliGRAM(s) IV Intermittent every 24 hours  folic acid 1 milliGRAM(s) Oral daily  heparin  Injectable 5000 Unit(s) SubCutaneous every 8 hours  insulin glargine Injectable (LANTUS) 12 Unit(s) SubCutaneous at bedtime  insulin regular  human corrective regimen sliding scale   SubCutaneous every 6 hours  midodrine 15 milliGRAM(s) Oral every 8 hours  modafinil 100 milliGRAM(s) Oral <User Schedule>  multivitamin 1 Tablet(s) Oral daily  norepinephrine Infusion 0.01 MICROgram(s)/kG/Min (1.5 mL/Hr) IV Continuous <Continuous>  pantoprazole   Suspension 40 milliGRAM(s) Oral daily  piperacillin/tazobactam IVPB. 2.25 Gram(s) IV Intermittent every 6 hours  thiamine 100 milliGRAM(s) Oral daily    MEDICATIONS  (PRN):  acetaminophen    Suspension. 650 milliGRAM(s) Oral every 6 hours PRN Moderate Pain (4 - 6)  dextrose Gel 1 Dose(s) Oral once PRN Blood Glucose LESS THAN 70 milliGRAM(s)/deciliter  glucagon  Injectable 1 milliGRAM(s) IntraMuscular once PRN Glucose LESS THAN 70 milligrams/deciliter      ALLERGIES:  Allergies    No Known Allergies    Intolerances        LABS:                        8.8    11.5  )-----------( 284      ( 14 Apr 2018 07:15 )             28.5     04-14    140  |  101  |  97<H>  ----------------------------<  92  4.9   |  24  |  5.10<H>    Ca    9.7      14 Apr 2018 07:15  Phos  3.1     04-14  Mg     2.2     04-14            RADIOLOGY & ADDITIONAL TESTS: Reviewed.

## 2018-04-15 NOTE — PROGRESS NOTE ADULT - ATTENDING COMMENTS
volume , lytes ok   next HD likely tuesday  can try albumin WV for hypotension-- does not seem overloaded despite rt effusion

## 2018-04-15 NOTE — PROGRESS NOTE ADULT - ASSESSMENT
C. tropicalis BSI likely 2/2 PC s/p removal 4/8 with subsequent clearance of fungemia. Negative RUKHSANA and ophtho eval. R IJ Palindrome HD catheter placed 4/12. Now with Klebsiella BSI 2/2 UTI. Continue fluconazole for candidemia. Change Zosyn to ceftriaxone 2g IV q24h given susceptibilities of bcx isolate. Surveillance bcx 4/14 ngtd. Low threshold to remove R IJ Palindrome catheter if pressor requirements worsen or if bcx become positive again; observe at this time.

## 2018-04-15 NOTE — PROGRESS NOTE ADULT - SUBJECTIVE AND OBJECTIVE BOX
INTERVAL HPI/OVERNIGHT EVENTS: Low grade fever. Pressor requirements improved.     ROS: UTO      ANTIBIOTICS/RELEVANT:    MEDICATIONS  (STANDING):  cefTRIAXone   IVPB 2 Gram(s) IV Intermittent every 24 hours  chlorhexidine 0.12% Liquid 15 milliLiter(s) Swish and Spit two times a day  chlorhexidine 2% Cloths 1 Application(s) Topical daily  dextrose 5%. 1000 milliLiter(s) (50 mL/Hr) IV Continuous <Continuous>  dextrose 50% Injectable 12.5 Gram(s) IV Push once  dextrose 50% Injectable 25 Gram(s) IV Push once  dextrose 50% Injectable 25 Gram(s) IV Push once  ergocalciferol Drops 6000 Unit(s) Oral daily  escitalopram 5 milliGRAM(s) Oral daily  fluconAZOLE IVPB 200 milliGRAM(s) IV Intermittent every 24 hours  folic acid 1 milliGRAM(s) Oral daily  heparin  Injectable 5000 Unit(s) SubCutaneous every 8 hours  insulin glargine Injectable (LANTUS) 10 Unit(s) SubCutaneous at bedtime  insulin regular  human corrective regimen sliding scale   SubCutaneous every 6 hours  midodrine 15 milliGRAM(s) Oral every 8 hours  modafinil 100 milliGRAM(s) Oral <User Schedule>  multivitamin 1 Tablet(s) Oral daily  norepinephrine Infusion 0.01 MICROgram(s)/kG/Min (1.5 mL/Hr) IV Continuous <Continuous>  pantoprazole   Suspension 40 milliGRAM(s) Oral daily  thiamine 100 milliGRAM(s) Oral daily    MEDICATIONS  (PRN):  acetaminophen    Suspension 650 milliGRAM(s) Oral every 6 hours PRN For Temp greater than 38 C (100.4 F)  acetaminophen    Suspension. 650 milliGRAM(s) Oral every 6 hours PRN Moderate Pain (4 - 6)  dextrose Gel 1 Dose(s) Oral once PRN Blood Glucose LESS THAN 70 milliGRAM(s)/deciliter  glucagon  Injectable 1 milliGRAM(s) IntraMuscular once PRN Glucose LESS THAN 70 milligrams/deciliter        Vital Signs Last 24 Hrs  T(C): 38.2 (15 Apr 2018 13:36), Max: 38.2 (15 Apr 2018 06:10)  T(F): 100.8 (15 Apr 2018 13:36), Max: 100.8 (15 Apr 2018 06:10)  HR: 92 (15 Apr 2018 14:00) (92 - 110)  BP: 83/58 (15 Apr 2018 13:00) (71/58 - 93/74)  BP(mean): 75 (15 Apr 2018 14:00) (61 - 89)  RR: 22 (15 Apr 2018 14:00) (10 - 69)  SpO2: 100% (15 Apr 2018 14:00) (94% - 100%)    PHYSICAL EXAM:  Constitutional appears unwell  Eyes:MAXI, EOMI  Ear/Nose/Throat: no oral lesion, no sinus tenderness on percussion	  Neck:no JVD, no lymphadenopathy, supple; R neck HD access catheter non-inflammed  Respiratory: CTA morris; +trach  Cardiovascular: S1S2 RRR, no murmurs  Gastrointestinal:soft, (+) BS, no HSM  Extremities:no e/e/c  Vascular: DP Pulse:	right normal; left normal  Neuro: R arm twitching      LABS:                        8.0    13.0  )-----------( 327      ( 15 Apr 2018 06:03 )             26.3     04-15    144  |  102  |  66<H>  ----------------------------<  136<H>  4.2   |  25  |  3.90<H>    Ca    9.0      15 Apr 2018 11:28  Phos  2.2     04-15  Mg     2.0     04-15      PT/INR - ( 15 Apr 2018 10:33 )   PT: 16.4 sec;   INR: 1.47          PTT - ( 15 Apr 2018 10:33 )  PTT:34.2 sec      MICROBIOLOGY: Culture - Blood (04.14.18 @ 21:18)    Specimen Source: .Blood Blood    Culture Results:   No growth at 12 hours        RADIOLOGY & ADDITIONAL STUDIES: reviewed

## 2018-04-15 NOTE — PROGRESS NOTE ADULT - PROBLEM SELECTOR PLAN 1
Patient is a 63  year old male with acute on chronic renal failure from ischemic ATN. Patient is currently requiring hemodialysis. Patient was last dialyzed 4/12 with UF of 1kg. Patient underwent permcath placement on 4/12     P - Next routine HD on 4/17 Tuesday. Will evaluate on Monday if needed

## 2018-04-15 NOTE — PROGRESS NOTE ADULT - ATTENDING COMMENTS
Pt remains poorly responsive however tolerated trach collar for some time yesterday. Resume today.  Low grade temps and VSS and repeat blood cx neg. Discuss with ID need to remove catheter. If blood cx pos or BP drops will absolutely remove catheter. Continue feeds based on new dry weight in ICU which is still below ideal BW.

## 2018-04-16 DIAGNOSIS — I95.9 HYPOTENSION, UNSPECIFIED: ICD-10-CM

## 2018-04-16 LAB
ALBUMIN SERPL ELPH-MCNC: 2.8 G/DL — LOW (ref 3.3–5)
ALP SERPL-CCNC: 148 U/L — HIGH (ref 40–120)
ALT FLD-CCNC: 17 U/L — SIGNIFICANT CHANGE UP (ref 10–45)
ANION GAP SERPL CALC-SCNC: 20 MMOL/L — HIGH (ref 5–17)
APTT BLD: 33.3 SEC — SIGNIFICANT CHANGE UP (ref 27.5–37.4)
APTT BLD: 39.2 SEC — HIGH (ref 27.5–37.4)
APTT BLD: 50.7 SEC — HIGH (ref 27.5–37.4)
AST SERPL-CCNC: 19 U/L — SIGNIFICANT CHANGE UP (ref 10–40)
BASOPHILS NFR BLD AUTO: 0.1 % — SIGNIFICANT CHANGE UP (ref 0–2)
BILIRUB SERPL-MCNC: 0.6 MG/DL — SIGNIFICANT CHANGE UP (ref 0.2–1.2)
BUN SERPL-MCNC: 69 MG/DL — HIGH (ref 7–23)
CALCIUM SERPL-MCNC: 9.2 MG/DL — SIGNIFICANT CHANGE UP (ref 8.4–10.5)
CHLORIDE SERPL-SCNC: 101 MMOL/L — SIGNIFICANT CHANGE UP (ref 96–108)
CO2 SERPL-SCNC: 23 MMOL/L — SIGNIFICANT CHANGE UP (ref 22–31)
CREAT SERPL-MCNC: 4.24 MG/DL — HIGH (ref 0.5–1.3)
EOSINOPHIL NFR BLD AUTO: 0.1 % — SIGNIFICANT CHANGE UP (ref 0–6)
GLUCOSE BLDC GLUCOMTR-MCNC: 148 MG/DL — HIGH (ref 70–99)
GLUCOSE BLDC GLUCOMTR-MCNC: 234 MG/DL — HIGH (ref 70–99)
GLUCOSE BLDC GLUCOMTR-MCNC: 91 MG/DL — SIGNIFICANT CHANGE UP (ref 70–99)
GLUCOSE SERPL-MCNC: 106 MG/DL — HIGH (ref 70–99)
HCT VFR BLD CALC: 27.9 % — LOW (ref 39–50)
HGB BLD-MCNC: 8.6 G/DL — LOW (ref 13–17)
INR BLD: 1.4 — HIGH (ref 0.88–1.16)
LYMPHOCYTES # BLD AUTO: 6.2 % — LOW (ref 13–44)
MAGNESIUM SERPL-MCNC: 2.1 MG/DL — SIGNIFICANT CHANGE UP (ref 1.6–2.6)
MCHC RBC-ENTMCNC: 29.6 PG — SIGNIFICANT CHANGE UP (ref 27–34)
MCHC RBC-ENTMCNC: 30.8 G/DL — LOW (ref 32–36)
MCV RBC AUTO: 95.9 FL — SIGNIFICANT CHANGE UP (ref 80–100)
MONOCYTES NFR BLD AUTO: 7.4 % — SIGNIFICANT CHANGE UP (ref 2–14)
NEUTROPHILS NFR BLD AUTO: 86.2 % — HIGH (ref 43–77)
PHOSPHATE SERPL-MCNC: 2.6 MG/DL — SIGNIFICANT CHANGE UP (ref 2.5–4.5)
PLATELET # BLD AUTO: 345 K/UL — SIGNIFICANT CHANGE UP (ref 150–400)
POTASSIUM SERPL-MCNC: 4.7 MMOL/L — SIGNIFICANT CHANGE UP (ref 3.5–5.3)
POTASSIUM SERPL-SCNC: 4.7 MMOL/L — SIGNIFICANT CHANGE UP (ref 3.5–5.3)
PROT SERPL-MCNC: 6.9 G/DL — SIGNIFICANT CHANGE UP (ref 6–8.3)
PROTHROM AB SERPL-ACNC: 15.6 SEC — HIGH (ref 9.8–12.7)
RBC # BLD: 2.91 M/UL — LOW (ref 4.2–5.8)
RBC # FLD: 18.4 % — HIGH (ref 10.3–16.9)
SODIUM SERPL-SCNC: 144 MMOL/L — SIGNIFICANT CHANGE UP (ref 135–145)
WBC # BLD: 18.4 K/UL — HIGH (ref 3.8–10.5)
WBC # FLD AUTO: 18.4 K/UL — HIGH (ref 3.8–10.5)

## 2018-04-16 PROCEDURE — 99233 SBSQ HOSP IP/OBS HIGH 50: CPT | Mod: GC

## 2018-04-16 PROCEDURE — 74018 RADEX ABDOMEN 1 VIEW: CPT | Mod: 26

## 2018-04-16 RX ORDER — CEFTRIAXONE 500 MG/1
2000 INJECTION, POWDER, FOR SOLUTION INTRAMUSCULAR; INTRAVENOUS EVERY 24 HOURS
Qty: 0 | Refills: 0 | Status: DISCONTINUED | OUTPATIENT
Start: 2018-04-16 | End: 2018-04-18

## 2018-04-16 RX ORDER — HEPARIN SODIUM 5000 [USP'U]/ML
1300 INJECTION INTRAVENOUS; SUBCUTANEOUS
Qty: 25000 | Refills: 0 | Status: DISCONTINUED | OUTPATIENT
Start: 2018-04-16 | End: 2018-04-16

## 2018-04-16 RX ORDER — HEPARIN SODIUM 5000 [USP'U]/ML
1700 INJECTION INTRAVENOUS; SUBCUTANEOUS
Qty: 25000 | Refills: 0 | Status: DISCONTINUED | OUTPATIENT
Start: 2018-04-16 | End: 2018-04-18

## 2018-04-16 RX ORDER — HEPARIN SODIUM 5000 [USP'U]/ML
1600 INJECTION INTRAVENOUS; SUBCUTANEOUS
Qty: 25000 | Refills: 0 | Status: DISCONTINUED | OUTPATIENT
Start: 2018-04-16 | End: 2018-04-16

## 2018-04-16 RX ORDER — ASPIRIN/CALCIUM CARB/MAGNESIUM 324 MG
81 TABLET ORAL DAILY
Qty: 0 | Refills: 0 | Status: DISCONTINUED | OUTPATIENT
Start: 2018-04-16 | End: 2018-06-26

## 2018-04-16 RX ADMIN — Medication 650 MILLIGRAM(S): at 18:23

## 2018-04-16 RX ADMIN — Medication 81 MILLIGRAM(S): at 18:24

## 2018-04-16 RX ADMIN — MIDODRINE HYDROCHLORIDE 15 MILLIGRAM(S): 2.5 TABLET ORAL at 03:22

## 2018-04-16 RX ADMIN — MIDODRINE HYDROCHLORIDE 15 MILLIGRAM(S): 2.5 TABLET ORAL at 18:24

## 2018-04-16 RX ADMIN — ESCITALOPRAM OXALATE 5 MILLIGRAM(S): 10 TABLET, FILM COATED ORAL at 14:05

## 2018-04-16 RX ADMIN — CHLORHEXIDINE GLUCONATE 15 MILLILITER(S): 213 SOLUTION TOPICAL at 11:05

## 2018-04-16 RX ADMIN — FLUCONAZOLE 100 MILLIGRAM(S): 150 TABLET ORAL at 22:29

## 2018-04-16 RX ADMIN — INSULIN GLARGINE 10 UNIT(S): 100 INJECTION, SOLUTION SUBCUTANEOUS at 22:28

## 2018-04-16 RX ADMIN — INSULIN HUMAN 2: 100 INJECTION, SOLUTION SUBCUTANEOUS at 00:51

## 2018-04-16 RX ADMIN — Medication 1 MILLIGRAM(S): at 13:56

## 2018-04-16 RX ADMIN — ERGOCALCIFEROL 6000 UNIT(S): 1.25 CAPSULE ORAL at 13:57

## 2018-04-16 RX ADMIN — INSULIN HUMAN 6: 100 INJECTION, SOLUTION SUBCUTANEOUS at 18:24

## 2018-04-16 RX ADMIN — CEFTRIAXONE 2000 MILLIGRAM(S): 500 INJECTION, POWDER, FOR SOLUTION INTRAMUSCULAR; INTRAVENOUS at 18:23

## 2018-04-16 RX ADMIN — HEPARIN SODIUM 13 UNIT(S)/HR: 5000 INJECTION INTRAVENOUS; SUBCUTANEOUS at 08:21

## 2018-04-16 RX ADMIN — Medication 100 MILLIGRAM(S): at 14:06

## 2018-04-16 RX ADMIN — MIDODRINE HYDROCHLORIDE 15 MILLIGRAM(S): 2.5 TABLET ORAL at 13:57

## 2018-04-16 RX ADMIN — CHLORHEXIDINE GLUCONATE 15 MILLILITER(S): 213 SOLUTION TOPICAL at 22:29

## 2018-04-16 RX ADMIN — MODAFINIL 100 MILLIGRAM(S): 200 TABLET ORAL at 06:28

## 2018-04-16 RX ADMIN — MODAFINIL 100 MILLIGRAM(S): 200 TABLET ORAL at 13:58

## 2018-04-16 RX ADMIN — Medication 1 TABLET(S): at 13:57

## 2018-04-16 RX ADMIN — HEPARIN SODIUM 5000 UNIT(S): 5000 INJECTION INTRAVENOUS; SUBCUTANEOUS at 06:28

## 2018-04-16 RX ADMIN — CHLORHEXIDINE GLUCONATE 1 APPLICATION(S): 213 SOLUTION TOPICAL at 06:30

## 2018-04-16 RX ADMIN — PANTOPRAZOLE SODIUM 40 MILLIGRAM(S): 20 TABLET, DELAYED RELEASE ORAL at 13:58

## 2018-04-16 NOTE — PROGRESS NOTE ADULT - ASSESSMENT
63 year old male with a history of HFrEF 10-15% due to ischemic cardiomyopathy, s/p AICD, ?atrial fibrillation, hypertension, diabetes mellitus type 2, gout, and CKD for whom nephrology was called for GILMER from ATN requiring hemodialysis. Currently dialysis dependent being dialyzed T/T/S

## 2018-04-16 NOTE — CHART NOTE - NSCHARTNOTEFT_GEN_A_CORE
Admitting Diagnosis:   63M PMH HFrEF 10-15% (ischemic), MI, s/p AICD vs PPM, possible Afib, HTN, DM2 on insulin, possible CKD, and gout, who presents with a chief complaint of generalized weakness s/p septic shock likely 2/2 LE cellulitis. AMS and new leukocytisis likely 2/2 UTI. New presentation of candidemia likely from intraabdominal source.     PAST MEDICAL & SURGICAL HISTORY:  Type 2 diabetes mellitus with diabetic peripheral angiopathy without gangrene, with long-term current  Essential hypertension, benign  Gout  Pacemaker  Chronic systolic heart failure  Myocardial infarction  No significant past surgical history      Current Nutrition Order:  Vital 1.5 via PEG at 53ml/hr x 24hr (1272ml TV, 1908kcal, 85g, 971ml water).  Infusing at goal rate with good tolerance per RN.    GI Issues:   Diarrhea resolving per RN  No residuals today    Pain:   Unable to assess 2/2 vent     Skin Integrity:   L buttock stage 2 PU  L calf venous ulcer  Trach stage 3 PU      Labs:       144  |  101  |  69<H>  ----------------------------<  106<H>  4.7   |  23  |  4.24<H>    Ca    9.2      2018 05:41  Phos  2.6       Mg     2.1         TPro  6.9  /  Alb  2.8<L>  /  TBili  0.6  /  DBili  x   /  AST  19  /  ALT  17  /  AlkPhos  148<H>      CAPILLARY BLOOD GLUCOSE      POCT Blood Glucose.: 148 mg/dL (2018 11:29)  POCT Blood Glucose.: 91 mg/dL (2018 06:13)  POCT Blood Glucose.: 195 mg/dL (15 Apr 2018 23:16)  POCT Blood Glucose.: 133 mg/dL (15 Apr 2018 16:51)      Medications:  MEDICATIONS  (STANDING):  cefTRIAXone Injectable. 2000 milliGRAM(s) IV Push every 24 hours  chlorhexidine 0.12% Liquid 15 milliLiter(s) Swish and Spit two times a day  chlorhexidine 2% Cloths 1 Application(s) Topical daily  dextrose 5%. 1000 milliLiter(s) (50 mL/Hr) IV Continuous <Continuous>  dextrose 50% Injectable 12.5 Gram(s) IV Push once  dextrose 50% Injectable 25 Gram(s) IV Push once  dextrose 50% Injectable 25 Gram(s) IV Push once  ergocalciferol Drops 6000 Unit(s) Oral daily  escitalopram 5 milliGRAM(s) Oral daily  fluconAZOLE IVPB 200 milliGRAM(s) IV Intermittent every 24 hours  folic acid 1 milliGRAM(s) Oral daily  heparin  Infusion 1300 Unit(s)/Hr (13 mL/Hr) IV Continuous <Continuous>  insulin glargine Injectable (LANTUS) 10 Unit(s) SubCutaneous at bedtime  insulin regular  human corrective regimen sliding scale   SubCutaneous every 6 hours  midodrine 15 milliGRAM(s) Oral every 8 hours  modafinil 100 milliGRAM(s) Oral <User Schedule>  multivitamin 1 Tablet(s) Oral daily  norepinephrine Infusion 0.01 MICROgram(s)/kG/Min (1.5 mL/Hr) IV Continuous <Continuous>  pantoprazole   Suspension 40 milliGRAM(s) Oral daily  thiamine 100 milliGRAM(s) Oral daily    MEDICATIONS  (PRN):  acetaminophen    Suspension 650 milliGRAM(s) Oral every 6 hours PRN For Temp greater than 38 C (100.4 F)  acetaminophen    Suspension. 650 milliGRAM(s) Oral every 6 hours PRN Moderate Pain (4 - 6)  dextrose Gel 1 Dose(s) Oral once PRN Blood Glucose LESS THAN 70 milliGRAM(s)/deciliter  glucagon  Injectable 1 milliGRAM(s) IntraMuscular once PRN Glucose LESS THAN 70 milligrams/deciliter      Weight:  72.7kg ()  Daily Weight in k.1 ( pre HD)  77.2kg ()  80.9 kg ()  84.5kg (3/31)  86.9kg (3/19)  94.7 kg (3/16)    Weight Change:   22kg weight loss since admit (HD initiation)    Estimated energy needs using 89 kg IBW; Needs estimated 2/2 vent/post-op/PU  Calories: 25-30 kcal/kg = 8800-8942 kcal/day  Protein: 1.4-1.6 g/kg = 125-142 g protein/day  Fluids: per team    Subjective:   S/p trach and PEG placements on . Candidemia found in blood cultures with bandemia, likely intraabdominal source of infection. Pt remains trached to vent. TF infusing via PEG at ordered goal rate and tolerated per RN. RN reports that no diarrhea since overnight and no residuals today. Continue with current formula and increase rate to meet EER. Monitor BG.     Previous Nutrition Diagnosis:   Increased protein-calorie needs RT increased demand for protein-calorie intake AEB on vent support    Active [X]  Resolved [   ]    New PES statement:     Goal:   Continue to meet % of nutrition needs via tolerated route.     Recommendations:  1. Continue Vital 1.5 via PEG and advance to goal rate of 63ml/hr x 24hr + 2x prostat (1512ml TV, 2468kcal, 132g, 1155ml water). Additional fluids per team. Monitor for s/s of intolerance and maintain aspiration precautions.   2. If diarrhea persists consider imodium.  3. Continue to trend weights.  4. Monitor POCT.   5. Keep nutrition aligned with GOC.     Education:   N/A-vent    Risk Level: High [X] Moderate [   ] Low [   ].

## 2018-04-16 NOTE — PROGRESS NOTE ADULT - SUBJECTIVE AND OBJECTIVE BOX
INTERVAL HPI/OVERNIGHT EVENTS:    OVERNIGHT: No overnight events. 100.8 yesterday afternoon. WBC 13 to 18.4. Still on levo gtt.  SUBJECTIVE: Patient seen and examined at bedside. Unable to obtain ROS.    OBJECTIVE:    VITAL SIGNS:  ICU Vital Signs Last 24 Hrs  T(C): 37.3 (16 Apr 2018 07:00), Max: 37.9 (15 Apr 2018 17:46)  T(F): 99.2 (16 Apr 2018 07:00), Max: 100.3 (15 Apr 2018 17:46)  HR: 106 (16 Apr 2018 13:00) (88 - 106)  BP: 96/74 (16 Apr 2018 13:00) (75/58 - 98/72)  BP(mean): 80 (16 Apr 2018 13:00) (63 - 85)  ABP: --  ABP(mean): --  RR: 14 (16 Apr 2018 13:00) (10 - 30)  SpO2: 100% (16 Apr 2018 13:00) (99% - 100%)    Mode: AC/ CMV (Assist Control/ Continuous Mandatory Ventilation), RR (machine): 10, TV (machine): 500, FiO2: 40, PEEP: 5, ITime: 1, MAP: 7.4, PIP: 13    04-15 @ 07:01 - 04-16 @ 07:00  --------------------------------------------------------  IN: 1693 mL / OUT: 400 mL / NET: 1293 mL    04-16 @ 07:01 - 04-16 @ 13:55  --------------------------------------------------------  IN: 372 mL / OUT: 0 mL / NET: 372 mL      CAPILLARY BLOOD GLUCOSE      POCT Blood Glucose.: 148 mg/dL (16 Apr 2018 11:29)      PHYSICAL EXAM:    General: NAD  HEENT: NCAT, PERRL, clear conjunctiva, no scleral icterus  Neck: supple, no JVD; with trach in place  Respiratory: CTA b/l  Cardiovascular: RRR, normal S1S2, no M/R/G  Abdomen: soft, NT/ND  Extremities: WWP, no edema  Neuro: AOx0    MEDICATIONS:  MEDICATIONS  (STANDING):  cefTRIAXone Injectable. 2000 milliGRAM(s) IV Push every 24 hours  chlorhexidine 0.12% Liquid 15 milliLiter(s) Swish and Spit two times a day  chlorhexidine 2% Cloths 1 Application(s) Topical daily  dextrose 5%. 1000 milliLiter(s) (50 mL/Hr) IV Continuous <Continuous>  dextrose 50% Injectable 12.5 Gram(s) IV Push once  dextrose 50% Injectable 25 Gram(s) IV Push once  dextrose 50% Injectable 25 Gram(s) IV Push once  ergocalciferol Drops 6000 Unit(s) Oral daily  escitalopram 5 milliGRAM(s) Oral daily  fluconAZOLE IVPB 200 milliGRAM(s) IV Intermittent every 24 hours  folic acid 1 milliGRAM(s) Oral daily  heparin  Infusion 1300 Unit(s)/Hr (13 mL/Hr) IV Continuous <Continuous>  insulin glargine Injectable (LANTUS) 10 Unit(s) SubCutaneous at bedtime  insulin regular  human corrective regimen sliding scale   SubCutaneous every 6 hours  midodrine 15 milliGRAM(s) Oral every 8 hours  modafinil 100 milliGRAM(s) Oral <User Schedule>  multivitamin 1 Tablet(s) Oral daily  norepinephrine Infusion 0.01 MICROgram(s)/kG/Min (1.5 mL/Hr) IV Continuous <Continuous>  pantoprazole   Suspension 40 milliGRAM(s) Oral daily  thiamine 100 milliGRAM(s) Oral daily    MEDICATIONS  (PRN):  acetaminophen    Suspension 650 milliGRAM(s) Oral every 6 hours PRN For Temp greater than 38 C (100.4 F)  acetaminophen    Suspension. 650 milliGRAM(s) Oral every 6 hours PRN Moderate Pain (4 - 6)  dextrose Gel 1 Dose(s) Oral once PRN Blood Glucose LESS THAN 70 milliGRAM(s)/deciliter  glucagon  Injectable 1 milliGRAM(s) IntraMuscular once PRN Glucose LESS THAN 70 milligrams/deciliter      ALLERGIES:  Allergies    No Known Allergies    Intolerances        LABS:                        8.6    18.4  )-----------( 345      ( 16 Apr 2018 05:41 )             27.9     04-16    144  |  101  |  69<H>  ----------------------------<  106<H>  4.7   |  23  |  4.24<H>    Ca    9.2      16 Apr 2018 05:41  Phos  2.6     04-16  Mg     2.1     04-16    TPro  6.9  /  Alb  2.8<L>  /  TBili  0.6  /  DBili  x   /  AST  19  /  ALT  17  /  AlkPhos  148<H>  04-16    PT/INR - ( 16 Apr 2018 05:41 )   PT: 15.6 sec;   INR: 1.40          PTT - ( 16 Apr 2018 05:41 )  PTT:33.3 sec    Culture - Blood (04.14.18 @ 21:18)    Specimen Source: .Blood Blood    Culture Results:   No growth at 1 day.    Culture - Blood (04.14.18 @ 21:18)    Specimen Source: .Blood Blood-Arterial    Culture Results:   No growth at 1 day.    Culture - Fungal, Blood (04.13.18 @ 16:55)    Specimen Source: .Blood Blood    Culture Results:   Testing in progress    Culture - Sputum . (04.13.18 @ 10:35)    -  Tobramycin: S <=4    -  Piperacillin/Tazobactam: S <=16    -  Ciprofloxacin: S <=1    -  Gentamicin: S <=4    -  Ampicillin/Sulbactam: S <=8/4    -  Ampicillin: R >16 These ampicillin results predict results for amoxicillin    -  Cefazolin: S <=8    -  Ceftriaxone: S <=1 Enterobacter, Citrobacter, and Serratia may develop resistance during prolonged therapy    -  Trimethoprim/Sulfamethoxazole: S <=2/38    Gram Stain:   Rare epithelial cells  Numerous White blood cells  Numerous Gram Positive Rods  Few Gram Positive Cocci in Pairs and Chains    Specimen Source: .Sputum Sputum    Culture Results:   Numerous Klebsiella pneumoniae  Accompanied by normal respiratory blu    Organism Identification: Klebsiella pneumoniae    Organism: Klebsiella pneumoniae    Method Type: YONATHAN    Culture - Blood (04.13.18 @ 08:01)    -  Trimethoprim/Sulfamethoxazole: S <=2/38    -  Klebsiella pneumoniae: Detec    Gram Stain:   Anaerobic Bottle: Gram Negative Rods  Result called to and read back by_ Ms. Bryanna Nolasco RN([id number])  04/13/2018 18:06:06  "Due to technical problems, Proteus sp. will Not be reported as part of  the BCID panel until further notice"  ***Blood PanelPCR results on this specimen are available  approximately 3 hours after the Gram stain result.***  Gram stain, PCR, and/or culture results may not always  correspond due to difference in methodologies.  ************************************************************  This PCR assay was performed using [employer].  The following targets are tested for: Enterococcus,  vancomycin resistant enterococci, Listeria monocytogenes,  coagulase negative staphylococci, S. aureus,  methicillin resistant S. aureus, Streptococcus agalactiae  (Group B), S. pneumoniae, S. pyogenes (Group A),  Acinetobacter baumannii, Enterobacter cloacae, E. coli,  Klebsiella oxytoca, K. pneumoniae, Proteus sp.,  Serratia marcescens, Haemophilus influenzae,  Neisseria meningitidis, Pseudomonas aeruginosa, Candida  albicans, C. glabrata, C krusei, C parapsilosis,  C. tropicalis and the KPC resistance gene.  Aerobic Bottle: Gram Negative Rods  Result called to and read back by_ ELISABET Nichols RN  04/14/2018 09:25:14    -  Ceftriaxone: S <=1 Enterobacter, Citrobacter, and Serratia may develop resistance during prolonged therapy    -  Cefazolin: S <=8    -  Ampicillin: R >16 These ampicillin results predict results for amoxicillin    -  Ampicillin/Sulbactam: S <=8/4    -  Ciprofloxacin: S <=1    -  Gentamicin: S <=4    -  Piperacillin/Tazobactam: S <=16    -  Tobramycin: S <=4    Specimen Source: .Blood Blood    Organism: Klebsiella pneumoniae    Organism: Blood Culture PCR    Culture Results:   Growth in aerobic and anaerobic bottles: Klebsiella pneumoniae    Organism Identification: Klebsiella pneumoniae  Blood Culture PCR    Method Type: PCR    Method Type: YONATHAN        RADIOLOGY & ADDITIONAL TESTS: Studies reviewed.

## 2018-04-16 NOTE — PROGRESS NOTE ADULT - ATTENDING COMMENTS
In addition to the above, worsening pressor requirements, persistent fevers, worsening leukocytosis. Low threshold to remove R IJ Palindrome catheter.

## 2018-04-16 NOTE — PROGRESS NOTE ADULT - ASSESSMENT
63M PMH HFrEF 10-15% (ischemic), MI, s/p AICD vs PPM, possible Afib, HTN, DM2 on insulin, possible CKD, and gout, who presented with a chief complaint of generalized weakness s/p septic shock likely 2/2 LE cellulitis. Now with AMS likely from brainstem infarct and/or toxic metabolic encephalopathy, now with candidemia (resolving) and sepsis 2/2 klebsiella.    NEURO  #AMS- Patient with decline in mental status and now minimally responsive since 3/22. Now intubated for airway protection. CT, CTA negative. VEEG showed moderate slowing but no seizure activity. Decline in mental status likely 2/2 to underlying infection vs acute neurologic event.   - Repeat CT head 4/11 performed due to concern for not moving LUE, however only showing only chronic infarctions.   - MRI (from 3/26) read addendum showing  C3/C4 level there is a an approximately 1.5 cm long extrusion coursing superiorly to the C2/C3 contacting the ventral spinal cord- Per neurology, this may be contributing to weakness, however would not explain change in mental status. 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 Per neuro, event could have resulted from hypoperfusion.   - C/w Modafinil  - S/p PEG/trach on 4/4  - Unclear neuro prognosis at this point, patient waxing and waning    ID  #Candidemia with candida tropicalis- Blood cultures positive for candida tropicalis on 4/7 and again on 4/9. Source unclear at this time.   - S/p Micafungin 100mg q 24hrs (4/7-4/12)  - c/w Fluconazole 200mg q 24hrs (4/13--)  - CTAP 4/11- Negative for abscess  - Surveillance fungal cultures 4/10 NGTD  - RUKHSANA negative for vegetations/ infection of AICD  - Tunelled HD catheter removed 4/8, temp HD placed 4/10, permanent HD cath placed 4/12  - ID following    #Klebsiella Bacteremia- Urine, blood and sputum cx growing klebsiella.  Sensitive to ceftriaxone  - Ceftriaxone 2g q 24 hrs (4/15--)  -S/p Zosyn 4/14-4/15  -S/p Meropenem 4/13  - surveillance blood cultures 4/14 NGTD   - f/u ID recs  -Per ID no need to replace HD catheter at this time, most likely source is urine. Consider replacing if patient unstable or w/ persistently positive cultures.     #Septic shock 2/2 Cellulitis- Resolved  - C/w midodrine to 15 q8   - discontinued Solucortef   - S/p zosyn for suspected UTI (3/25-3/26),  s/p Zosyn (3/11-2/21)  - RUQ sono doppler shows no portal vein thrombosis, only cholelithiasis  - TTE with no vegetations. Pt not stable for RUKHSANA.   - Gallium scan showed LLE cellulitis.     - repeat LE CT does not show abscess or gas  - HIV negative  - Vascular surgery on board. Recommending Amputation as an option if pt continues to be unstable.     #UTI- urine culture from 4/11 growing klebsiella sensitive to ceftriaxone, and now bacteremia with GNR likely 2/2 klebsiella from UTI.   - S/p zosyn 3/25-3/26 and Ceftriaxone   - now on Zosyn for GNR bacteremia likely 2/2 UTI    CARDIOVASCULAR   # HYPOTENSION-   - c/w Midodrine 15 mg q8h to maintain SBP>65  - Solumedrol discontinued today  - Levophed PRN to maintain MAP >65  - CHF with severely reduced EF 15%, caution with fluids. Per renal recs, can give IV albumin for fluids.    # CHF exacerbation- CHF with EF 10-15% per pt 2/2 ischemic cardiomyopathy s/p AICD. Elevated BNP on admission with R sided effusion and edema  - persistent pulmonary vascular congestion on CXR with layering pleural effusions.   - Maintain negative fluid balance with dialysis, watching BP  - Unclear medical regimen opt. Per [facility] pharmacy ( and [place]) pt has not picked up Torsemide 20 or Metoprolol 100 since November.   - Hold ACE/Metoprolol in setting of sepsis/ hypotension    #AFIB- hx of Afib and LV thrombus on coumadin.    - Holding Metoprolol 12.5 q12h given hypotension. Will use Dig for rate control if RVR   - Holding coumadin given thrombocytopenia, will consider starting when plts stable/ uptrending  - S/p heparin gtt, discontinued 2/2 thrombocytopenia concern for HIT.  - TTE with affinity shows no  LV thrombus currently    #CAD- Hx of MI s/p AICD  - Pt with pLAD in 7/2017, was started on Aspirin and Brilinta per records.  - Holding asp 81, coumadin and atorvastatin (discontinued 2/2 LFT uptrend, thrombocytopenia).     #LE Edema   - LE edema with chronic venous stasis.   - Duplex does not show DVT    PULMONARY   #Acute Resp failure- S/p tracheostomy 4/5. Back on vent overnight with CPAP trial this morning. Bedside US with worsened simple b/l pleural effusions and atelectatic lung. Less concern for infectious source given b/l effusions with no septations/loculations. Likely related to fluid overload. If clinically worsening, can consider thoracentesis for fluid studies and cultures.  - c/w daily CPAP trials, weaning to trach collar as tolerated  - c/w HD to remove excess fluid      RENAL   #ARF- likely ischemic ATN in setting of sepsis with low intravascular volume. Unknown baseline Cr presenting with Cr 3.93 with metabolic derangements. Renal team on board, pt with R HD catheter placed by IR on 3/21. Now on intermittent HD. HD catheter removed 4/8 due to fungemia, temporary HD catheter placed 4/10.    - Last dialyzed 4/14  - c/w intermittent HD per renal recs   - retroperitoneal ultrasound showing medical renal disease.     #Elevated AG- AG 21, lactate negative, glucose has been well controlled, possibly 2/2 uremia in setting of ESRD. AG decreased to 18 today in setting of elevated FSG.   - Monitor BMP    #Hyperkalemia- PT with elevated K to 5.7 on admission,  no EKG changes.  - Continue telemetry monitoring  - Trend K   - c/w dialysis   - Kayexalate PRN      GI   # transaminitis and elevated bili- Patient with transaminitis and elevated bili, that has remained stable. Prior RUQ US with cholelithiasis and prior CT abdomen/pelvis consistent with cholelithiasis and ascites.   - Repeat RUQ US negative fo cholecystitis rec HIDA if strong clinical suspicion.     HEME  #Thrombocytopenia- ELVIRA negative but PF4 was positive. Unlikely HIT. Most likely 2/2 zosyn or sepsis. Now resolved  -Restart DVT ppx with heparin subq.   -Consider restarting AC if remains stable.   - Continue to hold coumadin and Aspirin  - Monitor for signs of bleeding   - transfuse if <10 or <50 bleeding    #Elevated INR. Unclear etiology.   - Given Vit K and FFP3/12. FFP 3/13, FFP 4/4  - Monitor coags    #Anemia- Likely 2/2 phlebotomy and CKD, no signs of bleeding. Prior studies showed Anemia of chronic disease.  -Continue to monitor  -s/p 1pRBC on 3/12, 3/16 and 4/4    ENDOCRINE   - Lantus 10U  - MISS  - monitor FSG and adjust as needed   - A1C 8.6%      F: No IVF   E: Replete K<4 Mg<2, caution in setting of acute renal failure  N: PEG Placed 4/4, feeds changed to Vital 1.5 today given diarrhea.   DVT ppx: HSQ  GI ppx: PPI 40 daily  Lines: R dialysis permacath (4/12)  Drips: Levophed  Full code  Dispo: MICU 63M PMH HFrEF 10-15% (ischemic), MI, s/p AICD vs PPM, possible Afib, HTN, DM2 on insulin, possible CKD, and gout, who presented with a chief complaint of generalized weakness s/p septic shock likely 2/2 LE cellulitis. Now with AMS likely from brainstem infarct and/or toxic metabolic encephalopathy, now with candidemia (resolving) and sepsis 2/2 klebsiella.    NEURO  #AMS- Patient with decline in mental status and now minimally responsive since 3/22. CT, CTA negative. VEEG showed moderate slowing but no seizure activity. Decline in mental status likely 2/2 to underlying infection vs acute neurologic event. - S/p PEG/trach on 4/4   - Repeat CT head 4/11 performed due to concern for not moving LUE, however only showing only chronic infarctions.   - MRI (from 3/26) read addendum showing  C3/C4 level there is a an approximately 1.5 cm long extrusion coursing superiorly to the C2/C3 contacting the ventral spinal cord- Per neurology, this may be contributing to weakness, however would not explain change in mental status. 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 Per neuro, event could have resulted from hypoperfusion.   - C/w Modafinil  - Unclear neuro prognosis at this point, patient waxing and waning    ID  #Candidemia with candida tropicalis- Blood cultures positive for candida tropicalis on 4/7 and again on 4/9. No source identified. CTAP 4/11 negative for abscess.  - S/p Micafungin 100mg q 24hrs (4/7-4/12)  - c/w Fluconazole 200mg q 24hrs (4/13--), will need to continue for 2 weeks from first negative fungal culture (4/10), last day 4/24  - Surveillance fungal cultures 4/10 NGTD  - RUKHSANA negative for vegetations/ infection of AICD  - Tunelled HD catheter removed 4/8, temp HD placed 4/10, permanent HD cath placed 4/12  - ID following    #Klebsiella Bacteremia- Urine, blood and sputum cx growing klebsiella.  Sensitive to ceftriaxone  -C/w Ceftriaxone 2g q 24 hrs (4/15--)  -S/p Zosyn 4/14-4/15  -S/p Meropenem 4/13  - surveillance blood cultures 4/14 NGTD   - f/u ID recs  -Per ID no need to replace HD catheter at this time, most likely source is urine, however low threshold to remove catheter if patient persistently septic.     #Septic shock 2/2 Cellulitis- Resolved  - C/w midodrine to 15 q8   - discontinued Solucortef   - S/p zosyn for suspected UTI (3/25-3/26),  s/p Zosyn (3/11-2/21)  - RUQ sono doppler shows no portal vein thrombosis, only cholelithiasis  - TTE with no vegetations. Pt not stable for RUKHSANA.   - Gallium scan showed LLE cellulitis.     - repeat LE CT does not show abscess or gas  - HIV negative  - Vascular surgery on board. Recommending Amputation as an option if pt continues to be unstable.     #UTI- urine culture from 4/11 growing klebsiella sensitive to ceftriaxone, and now bacteremia with GNR likely 2/2 klebsiella from UTI.   - S/p zosyn 3/25-3/26 and Ceftriaxone   - now on Zosyn for GNR bacteremia likely 2/2 UTI    CARDIOVASCULAR   # HYPOTENSION-   - c/w Midodrine 15 mg q8h to maintain SBP>65  - Solumedrol discontinued 4/15 given concern for infection  - Levophed PRN to maintain MAP >65  - CHF with severely reduced EF 15%, caution with fluids. Per renal recs, can give IV albumin for fluids.    # CHF exacerbation- CHF with EF 10-15% per pt 2/2 ischemic cardiomyopathy s/p AICD. Elevated BNP on admission with R sided effusion and edema  - persistent pulmonary vascular congestion on CXR with layering pleural effusions.   - Maintain negative fluid balance with dialysis, watching BP  - Unclear medical regimen opt. Per [facility] pharmacy ( and [place]) pt has not picked up Torsemide 20 or Metoprolol 100 since November.   - Hold ACE/Metoprolol in setting of sepsis/ hypotension    #AFIB- hx of Afib and LV thrombus on coumadin.    - Heparin gtt, platelets improved  - Holding Metoprolol 12.5 q12h given hypotension. Will use Dig for rate control if RVR  - TTE with affinity shows no  LV thrombus currently    #CAD- Hx of MI s/p AICD  - Pt with pLAD in 7/2017, was started on Aspirin and Brilinta per records.  - Holding asp 81, coumadin and atorvastatin (discontinued 2/2 LFT uptrend, thrombocytopenia).     #LE Edema   - LE edema with chronic venous stasis.   - Duplex does not show DVT    PULMONARY   #Acute Resp failure- S/p tracheostomy 4/5. Bedside US with simple b/l pleural effusions and atelectatic lung. Less concern for infectious source given b/l effusions with no septations/loculations. Likely related to fluid overload. If clinically worsening, can consider thoracentesis for fluid studies and cultures.  - c/w daily CPAP trials, weaning to trach collar as tolerated  - c/w HD to remove excess fluid      RENAL   #ARF- likely ischemic ATN in setting of sepsis with low intravascular volume. Unknown baseline Cr presenting with Cr 3.93 with metabolic derangements. Renal team on board, pt with R HD catheter placed by IR on 3/21. Now on intermittent HD. HD catheter removed 4/8 due to fungemia, temporary HD catheter placed 4/10.  Now s/p permacath placement 4/12 fungal cultures were negative x 48hrs.   - Last dialyzed 4/14  - c/w intermittent HD per renal recs   - retroperitoneal ultrasound showing medical renal disease.     #Elevated AG- Lactate negative, glucose has been well controlled, possibly 2/2 uremia in setting of ESRD.   - Monitor BMP    #Hyperkalemia- PT with intermittently elevated K,  no EKG changes.  - Continue telemetry monitoring  - Trend K   - c/w dialysis   - Kayexalate PRN      GI   # transaminitis and elevated bili- Patient with transaminitis and elevated bili, that has now resolved. Prior RUQ US with cholelithiasis and prior CT abdomen/pelvis consistent with cholelithiasis and ascites.   - Repeat RUQ US negative fo cholecystitis rec HIDA if strong clinical suspicion.     HEME  #Thrombocytopenia- ELVIRA negative but PF4 was positive. Unlikely HIT. Most likely 2/2 zosyn or sepsis. Now resolved  -Restart heparin gtt  - Continue to hold coumadin and Aspirin  - Monitor for signs of bleeding   - transfuse if <10 or <50 bleeding    #Elevated INR. Unclear etiology.   - Given Vit K and FFP3/12. FFP 3/13, FFP 4/4  - Monitor coags    #Anemia- Likely 2/2 phlebotomy and CKD, no signs of bleeding. Prior studies showed Anemia of chronic disease.  -Continue to monitor  -s/p 1pRBC on 3/12, 3/16 and 4/4    ENDOCRINE   - Lantus 10U  - MISS  - monitor FSG and adjust as needed   - A1C 8.6%      F: No IVF   E: Replete K<4 Mg<2, caution in setting of acute renal failure  N: PEG Placed 4/4, feeds changed to Vital 1.5 given diarrhea.   DVT ppx: HSQ  GI ppx: PPI 40 daily  Lines: R dialysis permacath (4/12)  Drips: Levophed  Full code  Dispo: MICU 63M PMH HFrEF 10-15% (ischemic), MI, s/p AICD vs PPM, possible Afib, HTN, DM2 on insulin, possible CKD, and gout, who presented with a chief complaint of generalized weakness s/p septic shock likely 2/2 LE cellulitis. Now with AMS likely from brainstem infarct and/or toxic metabolic encephalopathy, now with candidemia (resolving) and sepsis 2/2 klebsiella.    NEURO  #AMS- Patient with decline in mental status and now minimally responsive since 3/22. CT, CTA negative. VEEG showed moderate slowing but no seizure activity. Decline in mental status likely 2/2 to underlying infection vs acute neurologic event. - S/p PEG/trach on 4/4   - Repeat CT head 4/11 performed due to concern for not moving LUE, however only showing only chronic infarctions.   - MRI (from 3/26) read addendum showing  C3/C4 level there is a an approximately 1.5 cm long extrusion coursing superiorly to the C2/C3 contacting the ventral spinal cord- Per neurology, this may be contributing to weakness, however would not explain change in mental status. 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 Per neuro, event could have resulted from hypoperfusion.   - C/w Modafinil  - Unclear neuro prognosis at this point, patient waxing and waning    ID  #Candidemia with candida tropicalis- Blood cultures positive for candida tropicalis on 4/7 and again on 4/9. No source identified. CTAP 4/11 negative for abscess.  - S/p Micafungin 100mg q 24hrs (4/7-4/12)  - c/w Fluconazole 200mg q 24hrs (4/13--), will need to continue for 2 weeks from first negative fungal culture (4/10), last day 4/24  - Surveillance fungal cultures 4/10 NGTD  - RUKHSANA negative for vegetations/ infection of AICD  - Tunelled HD catheter removed 4/8, temp HD placed 4/10, permanent HD cath placed 4/12  - ID following    #Klebsiella Bacteremia- Urine, blood and sputum cx growing klebsiella.  Sensitive to ceftriaxone  -C/w Ceftriaxone 2g q 24 hrs (4/15--)  -S/p Zosyn 4/14-4/15  -S/p Meropenem 4/13  - surveillance blood cultures 4/14 NGTD   - f/u ID recs  -Per ID no need to replace HD catheter at this time, most likely source is urine, however low threshold to remove catheter if patient persistently septic.     #Septic shock 2/2 Cellulitis- Resolved  - C/w midodrine to 15 q8   - discontinued Solucortef   - S/p zosyn for suspected UTI (3/25-3/26),  s/p Zosyn (3/11-2/21)  - RUQ sono doppler shows no portal vein thrombosis, only cholelithiasis  - TTE with no vegetations. Pt not stable for RUKHSANA.   - Gallium scan showed LLE cellulitis.     - repeat LE CT does not show abscess or gas  - HIV negative  - Vascular surgery on board. Recommending Amputation as an option if pt continues to be unstable.     #UTI- urine culture from 4/11 growing klebsiella sensitive to ceftriaxone, and now bacteremia with GNR likely 2/2 klebsiella from UTI.   - S/p zosyn 3/25-3/26 and Ceftriaxone   - now on Zosyn for GNR bacteremia likely 2/2 UTI    CARDIOVASCULAR   # HYPOTENSION-   - c/w Midodrine 15 mg q8h to maintain SBP>65  - Solumedrol discontinued 4/15 given concern for infection  - Levophed PRN to maintain MAP >65  - CHF with severely reduced EF 15%, caution with fluids. Per renal recs, can give IV albumin for fluids.    # CHF exacerbation- CHF with EF 10-15% per pt 2/2 ischemic cardiomyopathy s/p AICD. Elevated BNP on admission with R sided effusion and edema  - persistent pulmonary vascular congestion on CXR with layering pleural effusions.   - Maintain negative fluid balance with dialysis, watching BP  - Unclear medical regimen opt. Per [facility] pharmacy ( and [place]) pt has not picked up Torsemide 20 or Metoprolol 100 since November.   - Hold ACE/Metoprolol in setting of sepsis/ hypotension    #AFIB- hx of Afib and LV thrombus on coumadin.    - Heparin gtt, platelets improved  - Holding Metoprolol 12.5 q12h given hypotension. Will use Dig for rate control if RVR  - TTE with affinity shows no  LV thrombus currently    #CAD- Hx of MI s/p AICD  - Pt with pLAD in 7/2017, was started on Aspirin and Brilinta per records.  Restart asp 81 no that thrombocytopenia resolved  -Continue to hold coumadin and atorvastatin    #LE Edema   - LE edema with chronic venous stasis.   - Duplex does not show DVT    PULMONARY   #Acute Resp failure- S/p tracheostomy 4/5. Bedside US with simple b/l pleural effusions and atelectatic lung. Less concern for infectious source given b/l effusions with no septations/loculations. Likely related to fluid overload. If clinically worsening, can consider thoracentesis for fluid studies and cultures.  - c/w daily CPAP trials, weaning to trach collar as tolerated  - c/w HD to remove excess fluid      RENAL   #ARF- likely ischemic ATN in setting of sepsis with low intravascular volume. Unknown baseline Cr presenting with Cr 3.93 with metabolic derangements. Renal team on board, pt with R HD catheter placed by IR on 3/21. Now on intermittent HD. HD catheter removed 4/8 due to fungemia, temporary HD catheter placed 4/10.  Now s/p permacath placement 4/12 fungal cultures were negative x 48hrs.   - Last dialyzed 4/14  - c/w intermittent HD per renal recs   - retroperitoneal ultrasound showing medical renal disease.     #Elevated AG- Lactate negative, glucose has been well controlled, possibly 2/2 uremia in setting of ESRD.   - Monitor BMP    #Hyperkalemia- PT with intermittently elevated K,  no EKG changes.  - Continue telemetry monitoring  - Trend K   - c/w dialysis   - Kayexalate PRN      GI   # transaminitis and elevated bili- Patient with transaminitis and elevated bili, that has now resolved. Prior RUQ US with cholelithiasis and prior CT abdomen/pelvis consistent with cholelithiasis and ascites.   - Repeat RUQ US negative fo cholecystitis rec HIDA if strong clinical suspicion.     HEME  #Thrombocytopenia- ELVIRA negative but PF4 was positive. Unlikely HIT. Most likely 2/2 zosyn or sepsis. Now resolved  -Restart heparin gtt  - Continue to hold coumadin   - Monitor for signs of bleeding   - transfuse if <10 or <50 bleeding    #Elevated INR. Unclear etiology.   - Given Vit K and FFP3/12. FFP 3/13, FFP 4/4  - Monitor coags    #Anemia- Likely 2/2 phlebotomy and CKD, no signs of bleeding. Prior studies showed Anemia of chronic disease.  -Continue to monitor  -s/p 1pRBC on 3/12, 3/16 and 4/4    ENDOCRINE   - Lantus 10U  - MISS  - monitor FSG and adjust as needed   - A1C 8.6%      F: No IVF   E: Replete K<4 Mg<2, caution in setting of acute renal failure  N: PEG Placed 4/4, feeds changed to Vital 1.5 given diarrhea.   DVT ppx: HSQ  GI ppx: PPI 40 daily  Lines: R dialysis permacath (4/12)  Drips: Levophed  Full code  Dispo: MICU 63M PMH HFrEF 10-15% (ischemic), MI, s/p AICD vs PPM, possible Afib, HTN, DM2 on insulin, possible CKD, and gout, who presented with a chief complaint of generalized weakness s/p septic shock likely 2/2 LE cellulitis complicated by ATN requiring dialysis. Now with AMS likely from brainstem infarct and/or toxic metabolic encephalopathy, now with candidemia (resolving) and sepsis 2/2 klebsiella bacteremia.    NEURO  #AMS- Patient with decline in mental status and now labile responsiveness since 3/22. CT, CTA negative. VEEG showed moderate slowing but no seizure activity. Decline in mental status likely 2/2 to underlying infection vs acute neurologic event. - S/p PEG/trach on 4/4   - Repeat CT head 4/11 performed due to concern for not moving LUE, however only showing only chronic infarctions.   - MRI (from 3/26) read addendum showing  C3/C4 level there is a an approximately 1.5 cm long extrusion coursing superiorly to the C2/C3 contacting the ventral spinal cord- Per neurology, this may be contributing to weakness, however would not explain change in mental status. 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 Per neuro, event could have resulted from hypoperfusion.   - C/w Modafinil  - Unclear neuro prognosis at this point, patient waxing and waning    ID  #Candidemia with candida tropicalis- Blood cultures positive for candida tropicalis on 4/7 and again on 4/9. No source identified. CTAP 4/11 negative for abscess.  - S/p Micafungin 100mg q 24hrs (4/7-4/12)  - c/w Fluconazole 200mg q 24hrs (4/13--), will need to continue for 2 weeks from first negative fungal culture (4/10), last day 4/24  - Surveillance fungal cultures 4/10 NGTD  - RUKHSANA negative for vegetations/ infection of AICD  - Tunelled HD catheter removed 4/8, temp HD placed 4/10, permanent HD cath placed 4/12  - ID following    #Klebsiella Bacteremia- Urine, blood and sputum cx growing klebsiella.  Sensitive to ceftriaxone  -C/w Ceftriaxone 2g q 24 hrs (4/15--)  -S/p Zosyn 4/14-4/15  -S/p Meropenem 4/13  - surveillance blood cultures 4/14 NGTD   - f/u ID recs  -Per ID no need to replace HD catheter at this time, most likely source is urine, however low threshold to remove catheter if patient persistently septic.     #Septic shock 2/2 Cellulitis- Resolved  - C/w midodrine to 15 q8   - discontinued Solucortef   - S/p zosyn for suspected UTI (3/25-3/26),  s/p Zosyn (3/11-2/21)  - RUQ sono doppler shows no portal vein thrombosis, only cholelithiasis  - TTE with no vegetations. Pt not stable for RUKHSANA.   - Gallium scan showed LLE cellulitis.     - repeat LE CT does not show abscess or gas  - HIV negative  - Vascular surgery on board. Recommending Amputation as an option if pt continues to be unstable.     #UTI- urine culture from 4/11 growing klebsiella sensitive to ceftriaxone, and now bacteremia with GNR likely 2/2 klebsiella (in urine and lung).     CARDIOVASCULAR   # HYPOTENSION-   - c/w Midodrine 15 mg q8h to maintain SBP>65  - Solucortef discontinued 4/15 as felt to be no longer needed  - Levophed PRN to maintain MAP >65  - CHF with severely reduced EF 15%, caution with fluids.     # CHF exacerbation- CHF with EF 10-15% per pt 2/2 ischemic cardiomyopathy s/p AICD. Elevated BNP on admission with R sided effusion and edema  - persistent pulmonary vascular congestion on CXR with layering pleural effusions.   - Maintain negative fluid balance with dialysis, watching BP  - Unclear medical regimen opt. Per [facility] pharmacy ( and [place]) pt has not picked up Torsemide 20 or Metoprolol 100 since November.   - Hold ACE/Metoprolol in setting of sepsis/ hypotension    #AFIB- hx of Afib and LV thrombus on coumadin.    - Heparin gtt, platelets improved  - Holding Metoprolol 12.5 q12h given hypotension. Will use Dig for rate control if RVR  - TTE with definity shows no  LV thrombus currently    #CAD- Hx of MI s/p AICD  - Pt with pLAD in 7/2017, was started on Aspirin and Brilinta per records.  Restart asp 81 now that thrombocytopenia resolved  -Continue to hold coumadin and atorvastatin    #LE Edema   - LE edema with chronic venous stasis.   - Duplex does not show DVT    PULMONARY   #Acute Resp failure- S/p tracheostomy 4/5. Bedside US with simple b/l pleural effusions and atelectatic lung. Less concern for infectious source given b/l effusions with no septations/loculations. Likely related to fluid overload. If clinically worsening, can consider thoracentesis for fluid studies and cultures.  - c/w daily CPAP trials, weaning to trach collar as tolerated  - c/w HD to remove excess fluid      RENAL   #ARF- likely ischemic ATN in setting of sepsis with low intravascular volume. Unknown baseline Cr presenting with Cr 3.93 with metabolic derangements. Renal team on board, pt with R HD catheter placed by IR on 3/21. Now on intermittent HD. HD catheter removed 4/8 due to fungemia, temporary HD catheter placed 4/10.  Now s/p permacath placement 4/12 fungal cultures were negative x 48hrs.   - Last dialyzed 4/14  - c/w intermittent HD per renal recs   - retroperitoneal ultrasound showing medical renal disease.     #Elevated AG- Fluctuating at low level; lactate negative, glucose has been well controlled, possibly 2/2 uremia in setting of ESRD.   - Monitor BMP    #Hyperkalemia- PT with intermittently elevated K,  no EKG changes.  - Continue telemetry monitoring  - Trend K   - c/w dialysis   - Kayexalate PRN      GI   # transaminitis and elevated bili- Patient with transaminitis and elevated bili, that has now resolved. Prior RUQ US with cholelithiasis and prior CT abdomen/pelvis consistent with cholelithiasis and ascites.   - Repeat RUQ US negative fo cholecystitis rec HIDA if strong clinical suspicion.     HEME  #Thrombocytopenia- ELVIRA negative but PF4 was positive. Unlikely HIT. Most likely 2/2 zosyn or sepsis. Now resolved  -Restart heparin gtt  - Continue to hold coumadin until clear he has no further procedures       #Elevated INR. Unclear etiology.   - Given Vit K and FFP3/12. FFP 3/13, FFP 4/4  - Monitor coags    #Anemia- Likely 2/2 phlebotomy and CKD, no signs of bleeding. Prior studies showed Anemia of chronic disease.  -Continue to monitor  -s/p 1pRBC on 3/12, 3/16 and 4/4    ENDOCRINE   - Lantus 10U  - MISS  - monitor FSG and adjust as needed   - A1C 8.6%      F: No IVF   E: Replete K<4 Mg<2, caution in setting of acute renal failure  N: PEG Placed 4/4, feeds changed to Vital 1.5 given diarrhea.   DVT ppx: on heparin drip  GI ppx: PPI 40 daily  Lines: R dialysis permacath (4/12)  Drips: Levophed  Full code  Dispo: MICU

## 2018-04-16 NOTE — PROGRESS NOTE ADULT - SUBJECTIVE AND OBJECTIVE BOX
INTERVAL HPI/OVERNIGHT EVENTS: On levophed overnight 8 ->6. 19 beats of vtach. Low grade fever 100.3.    SUBJECTIVE: Patient seen and examined at bedside. Uncomfortable appearing, grimaces to sternal rub. Non-verbal. ROS not obtainable.     OBJECTIVE:    VITAL SIGNS:  ICU Vital Signs Last 24 Hrs  T(C): 37.3 (16 Apr 2018 07:00), Max: 38.2 (15 Apr 2018 10:32)  T(F): 99.2 (16 Apr 2018 07:00), Max: 100.8 (15 Apr 2018 13:36)  HR: 92 (16 Apr 2018 08:00) (88 - 106)  BP: 83/73 (16 Apr 2018 08:00) (71/58 - 98/72)  BP(mean): 78 (16 Apr 2018 08:00) (61 - 85)  ABP: --  ABP(mean): --  RR: 10 (16 Apr 2018 08:00) (10 - 30)  SpO2: 100% (16 Apr 2018 08:00) (100% - 100%)    Mode: CPAP with PS, FiO2: 40, PEEP: 5, PS: 8, MAP: 7.4, PIP: 13    04-15 @ 07:01  -  04-16 @ 07:00  --------------------------------------------------------  IN: 1693 mL / OUT: 400 mL / NET: 1293 mL      CAPILLARY BLOOD GLUCOSE      POCT Blood Glucose.: 91 mg/dL (16 Apr 2018 06:13)      PHYSICAL EXAM:    General: Uncomfortable appearing, laying in bed, NAD.   HEENT: NC/AT; MM dry  Neck: + Trach in place, no visible secretions.   Respiratory: B/l rhonchi, no wheezing. Non-labored breathing on CPAP.   Cardiovascular: +S1/S2; RRR, no m/r/g  Abdomen: soft, mildly distended, grimaces to palpation diffuses, + PEG in place c/d/i  Extremities: WWP, 2+ peripheral pulses b/l; 1+ dependent edema to thigh  Skin: Chornic venous stasis changes of b/l LE.   Neurological: +grimaces to pain, spontaneous movement of RUE, no movement of LUE, or b/l LE. Increased tone.     MEDICATIONS:  MEDICATIONS  (STANDING):  cefTRIAXone Injectable. 2000 milliGRAM(s) IV Push every 24 hours  chlorhexidine 0.12% Liquid 15 milliLiter(s) Swish and Spit two times a day  chlorhexidine 2% Cloths 1 Application(s) Topical daily  dextrose 5%. 1000 milliLiter(s) (50 mL/Hr) IV Continuous <Continuous>  dextrose 50% Injectable 12.5 Gram(s) IV Push once  dextrose 50% Injectable 25 Gram(s) IV Push once  dextrose 50% Injectable 25 Gram(s) IV Push once  ergocalciferol Drops 6000 Unit(s) Oral daily  escitalopram 5 milliGRAM(s) Oral daily  fluconAZOLE IVPB 200 milliGRAM(s) IV Intermittent every 24 hours  folic acid 1 milliGRAM(s) Oral daily  heparin  Infusion 1300 Unit(s)/Hr (13 mL/Hr) IV Continuous <Continuous>  insulin glargine Injectable (LANTUS) 10 Unit(s) SubCutaneous at bedtime  insulin regular  human corrective regimen sliding scale   SubCutaneous every 6 hours  midodrine 15 milliGRAM(s) Oral every 8 hours  modafinil 100 milliGRAM(s) Oral <User Schedule>  multivitamin 1 Tablet(s) Oral daily  norepinephrine Infusion 0.01 MICROgram(s)/kG/Min (1.5 mL/Hr) IV Continuous <Continuous>  pantoprazole   Suspension 40 milliGRAM(s) Oral daily  thiamine 100 milliGRAM(s) Oral daily    MEDICATIONS  (PRN):  acetaminophen    Suspension 650 milliGRAM(s) Oral every 6 hours PRN For Temp greater than 38 C (100.4 F)  acetaminophen    Suspension. 650 milliGRAM(s) Oral every 6 hours PRN Moderate Pain (4 - 6)  dextrose Gel 1 Dose(s) Oral once PRN Blood Glucose LESS THAN 70 milliGRAM(s)/deciliter  glucagon  Injectable 1 milliGRAM(s) IntraMuscular once PRN Glucose LESS THAN 70 milligrams/deciliter      ALLERGIES:  Allergies    No Known Allergies    Intolerances        LABS:                        8.6    18.4  )-----------( 345      ( 16 Apr 2018 05:41 )             27.9     04-16    144  |  101  |  69<H>  ----------------------------<  106<H>  4.7   |  23  |  4.24<H>    Ca    9.2      16 Apr 2018 05:41  Phos  2.6     04-16  Mg     2.1     04-16    TPro  6.9  /  Alb  2.8<L>  /  TBili  0.6  /  DBili  x   /  AST  19  /  ALT  17  /  AlkPhos  148<H>  04-16    PT/INR - ( 16 Apr 2018 05:41 )   PT: 15.6 sec;   INR: 1.40          PTT - ( 16 Apr 2018 05:41 )  PTT:33.3 sec      RADIOLOGY & ADDITIONAL TESTS: Reviewed. INTERVAL HPI/OVERNIGHT EVENTS: On levophed overnight 8 ->6. 19 beats of vtach. Low grade fever 100.3.    SUBJECTIVE: Patient seen and examined at bedside. Uncomfortable appearing, grimaces to sternal rub. Non-verbal. ROS not obtainable.     OBJECTIVE:    VITAL SIGNS:  ICU Vital Signs Last 24 Hrs  T(C): 37.3 (16 Apr 2018 07:00), Max: 38.2 (15 Apr 2018 10:32)  T(F): 99.2 (16 Apr 2018 07:00), Max: 100.8 (15 Apr 2018 13:36)  HR: 92 (16 Apr 2018 08:00) (88 - 106)  BP: 83/73 (16 Apr 2018 08:00) (71/58 - 98/72)  BP(mean): 78 (16 Apr 2018 08:00) (61 - 85)  ABP: --  ABP(mean): --  RR: 10 (16 Apr 2018 08:00) (10 - 30)  SpO2: 100% (16 Apr 2018 08:00) (100% - 100%)    Mode: CPAP with PS, FiO2: 40, PEEP: 5, PS: 8, MAP: 7.4, PIP: 13    04-15 @ 07:01  -  04-16 @ 07:00  --------------------------------------------------------  IN: 1693 mL / OUT: 400 mL / NET: 1293 mL      CAPILLARY BLOOD GLUCOSE      POCT Blood Glucose.: 91 mg/dL (16 Apr 2018 06:13)      PHYSICAL EXAM:    General: Uncomfortable appearing, laying in bed, NAD.   HEENT: NC/AT; MM dry  Neck: + Trach in place, no visible secretions.   Respiratory: B/l rhonchi, no wheezing. Non-labored breathing on CPAP.   Cardiovascular: +S1/S2; RRR, no m/r/g  Abdomen: soft, mildly distended, grimaces to palpation diffuses, + PEG in place c/d/i  Extremities: WWP,  1+ dependent edema to thigh  Vascular: 2+ peripheral pulses b/l  Skin: Chornic venous stasis changes of b/l LE.   Neurological: +grimaces to pain, spontaneous movement of RUE, no movement of LUE, or b/l LE. Increased tone.     MEDICATIONS:  MEDICATIONS  (STANDING):  cefTRIAXone Injectable. 2000 milliGRAM(s) IV Push every 24 hours  chlorhexidine 0.12% Liquid 15 milliLiter(s) Swish and Spit two times a day  chlorhexidine 2% Cloths 1 Application(s) Topical daily  dextrose 5%. 1000 milliLiter(s) (50 mL/Hr) IV Continuous <Continuous>  dextrose 50% Injectable 12.5 Gram(s) IV Push once  dextrose 50% Injectable 25 Gram(s) IV Push once  dextrose 50% Injectable 25 Gram(s) IV Push once  ergocalciferol Drops 6000 Unit(s) Oral daily  escitalopram 5 milliGRAM(s) Oral daily  fluconAZOLE IVPB 200 milliGRAM(s) IV Intermittent every 24 hours  folic acid 1 milliGRAM(s) Oral daily  heparin  Infusion 1300 Unit(s)/Hr (13 mL/Hr) IV Continuous <Continuous>  insulin glargine Injectable (LANTUS) 10 Unit(s) SubCutaneous at bedtime  insulin regular  human corrective regimen sliding scale   SubCutaneous every 6 hours  midodrine 15 milliGRAM(s) Oral every 8 hours  modafinil 100 milliGRAM(s) Oral <User Schedule>  multivitamin 1 Tablet(s) Oral daily  norepinephrine Infusion 0.01 MICROgram(s)/kG/Min (1.5 mL/Hr) IV Continuous <Continuous>  pantoprazole   Suspension 40 milliGRAM(s) Oral daily  thiamine 100 milliGRAM(s) Oral daily    MEDICATIONS  (PRN):  acetaminophen    Suspension 650 milliGRAM(s) Oral every 6 hours PRN For Temp greater than 38 C (100.4 F)  acetaminophen    Suspension. 650 milliGRAM(s) Oral every 6 hours PRN Moderate Pain (4 - 6)  dextrose Gel 1 Dose(s) Oral once PRN Blood Glucose LESS THAN 70 milliGRAM(s)/deciliter  glucagon  Injectable 1 milliGRAM(s) IntraMuscular once PRN Glucose LESS THAN 70 milligrams/deciliter      ALLERGIES:  Allergies    No Known Allergies    Intolerances        LABS:                        8.6    18.4  )-----------( 345      ( 16 Apr 2018 05:41 )             27.9     04-16    144  |  101  |  69<H>  ----------------------------<  106<H>  4.7   |  23  |  4.24<H>    Ca    9.2      16 Apr 2018 05:41  Phos  2.6     04-16  Mg     2.1     04-16    TPro  6.9  /  Alb  2.8<L>  /  TBili  0.6  /  DBili  x   /  AST  19  /  ALT  17  /  AlkPhos  148<H>  04-16    PT/INR - ( 16 Apr 2018 05:41 )   PT: 15.6 sec;   INR: 1.40          PTT - ( 16 Apr 2018 05:41 )  PTT:33.3 sec      RADIOLOGY & ADDITIONAL TESTS: Reviewed. INTERVAL HPI/OVERNIGHT EVENTS: On levophed overnight 4 ->3 mcg/kg/min. 19 beats of vtach. Low grade fever 100.3.    SUBJECTIVE: Patient seen and examined at bedside. Uncomfortable appearing, grimaces to sternal rub. Non-verbal. ROS not obtainable.     OBJECTIVE:    VITAL SIGNS:  ICU Vital Signs Last 24 Hrs  T(C): 37.3 (16 Apr 2018 07:00), Max: 38.2 (15 Apr 2018 10:32)  T(F): 99.2 (16 Apr 2018 07:00), Max: 100.8 (15 Apr 2018 13:36)  HR: 92 (16 Apr 2018 08:00) (88 - 106)  BP: 83/73 (16 Apr 2018 08:00) (71/58 - 98/72)  BP(mean): 78 (16 Apr 2018 08:00) (61 - 85)  ABP: --  ABP(mean): --  RR: 10 (16 Apr 2018 08:00) (10 - 30)  SpO2: 100% (16 Apr 2018 08:00) (100% - 100%)    Mode: CPAP with PS, FiO2: 40, PEEP: 5, PS: 8, MAP: 7.4, PIP: 13    04-15 @ 07:01  -  04-16 @ 07:00  --------------------------------------------------------  IN: 1693 mL / OUT: 400 mL / NET: 1293 mL      CAPILLARY BLOOD GLUCOSE      POCT Blood Glucose.: 91 mg/dL (16 Apr 2018 06:13)      PHYSICAL EXAM:    General: Uncomfortable appearing, laying in bed, NAD.   HEENT: NC/AT; MM dry  Neck: + Trach in place, no visible secretions.   Respiratory: B/l rhonchi, no wheezing. Non-labored breathing on CPAP.   Cardiovascular: +S1/S2; RRR, no m/r/g  Abdomen: soft, mildly distended, grimaces to palpation diffuses, + PEG in place c/d/i  Extremities: WWP,  1+ dependent edema to thigh  Vascular: 2+ peripheral pulses b/l  MSK: no joint swelling  Skin: Chornic venous stasis changes of b/l LE.   Neurological: +grimaces to pain, spontaneous movement of RUE, no movement of LUE, or b/l LE. Increased tone.     MEDICATIONS:  MEDICATIONS  (STANDING):  cefTRIAXone Injectable. 2000 milliGRAM(s) IV Push every 24 hours  chlorhexidine 0.12% Liquid 15 milliLiter(s) Swish and Spit two times a day  chlorhexidine 2% Cloths 1 Application(s) Topical daily  dextrose 5%. 1000 milliLiter(s) (50 mL/Hr) IV Continuous <Continuous>  dextrose 50% Injectable 12.5 Gram(s) IV Push once  dextrose 50% Injectable 25 Gram(s) IV Push once  dextrose 50% Injectable 25 Gram(s) IV Push once  ergocalciferol Drops 6000 Unit(s) Oral daily  escitalopram 5 milliGRAM(s) Oral daily  fluconAZOLE IVPB 200 milliGRAM(s) IV Intermittent every 24 hours  folic acid 1 milliGRAM(s) Oral daily  heparin  Infusion 1300 Unit(s)/Hr (13 mL/Hr) IV Continuous <Continuous>  insulin glargine Injectable (LANTUS) 10 Unit(s) SubCutaneous at bedtime  insulin regular  human corrective regimen sliding scale   SubCutaneous every 6 hours  midodrine 15 milliGRAM(s) Oral every 8 hours  modafinil 100 milliGRAM(s) Oral <User Schedule>  multivitamin 1 Tablet(s) Oral daily  norepinephrine Infusion 0.01 MICROgram(s)/kG/Min (1.5 mL/Hr) IV Continuous <Continuous>  pantoprazole   Suspension 40 milliGRAM(s) Oral daily  thiamine 100 milliGRAM(s) Oral daily    MEDICATIONS  (PRN):  acetaminophen    Suspension 650 milliGRAM(s) Oral every 6 hours PRN For Temp greater than 38 C (100.4 F)  acetaminophen    Suspension. 650 milliGRAM(s) Oral every 6 hours PRN Moderate Pain (4 - 6)  dextrose Gel 1 Dose(s) Oral once PRN Blood Glucose LESS THAN 70 milliGRAM(s)/deciliter  glucagon  Injectable 1 milliGRAM(s) IntraMuscular once PRN Glucose LESS THAN 70 milligrams/deciliter      ALLERGIES:  Allergies    No Known Allergies    Intolerances        LABS:                        8.6    18.4  )-----------( 345      ( 16 Apr 2018 05:41 )             27.9     04-16    144  |  101  |  69<H>  ----------------------------<  106<H>  4.7   |  23  |  4.24<H>    Ca    9.2      16 Apr 2018 05:41  Phos  2.6     04-16  Mg     2.1     04-16    TPro  6.9  /  Alb  2.8<L>  /  TBili  0.6  /  DBili  x   /  AST  19  /  ALT  17  /  AlkPhos  148<H>  04-16    PT/INR - ( 16 Apr 2018 05:41 )   PT: 15.6 sec;   INR: 1.40          PTT - ( 16 Apr 2018 05:41 )  PTT:33.3 sec      RADIOLOGY & ADDITIONAL TESTS: Reviewed.

## 2018-04-16 NOTE — PROGRESS NOTE ADULT - ASSESSMENT
IMPRESSION: C. tropicalis BSI likely 2/2 PC s/p removal 4/8 with subsequent clearance of fungemia. Negative RUKHSANA and ophtho eval. R IJ Palindrome HD catheter placed 4/12. Now with Klebsiella BSI 2/2 UTI.     Recommend:  1. Continue fluconazole for candidemia.   2. Continue ceftriaxone 2g IV q24h given susceptibilities of bcx isolate.   3. Follow up surveillance blood cultures.  4. Low threshold to remove R IJ Palindrome catheter if pressor requirements worsen or if bcx become positive again.  5. Would clarify goals of care.    ID will follow.

## 2018-04-16 NOTE — PROGRESS NOTE ADULT - SUBJECTIVE AND OBJECTIVE BOX
CC: SEPSIS DUE TO UNSPECIFIEDORGANISM, CELLULITIS OF      INTERVAL HISTORY:  trached  last night's events noted with runs of V tach and low-grade fever; requiring IV Levophed again      ROS: No chest pain, no sob, no abd pain. No n/v/d    PAST MEDICAL & SURGICAL HISTORY:  Type 2 diabetes mellitus with diabetic peripheral angiopathy without gangrene, with long-term curren  Essential hypertension, benign  Gout  Pacemaker  Chronic systolic heart failure  Myocardial infarction  No significant past surgical history      PHYSICAL EXAM:  T(C): 37.3 (04-16-18 @ 07:00), Max: 38.2 (04-15-18 @ 10:32)  HR: 92 (04-16-18 @ 10:00)  BP: 83/66 (04-16-18 @ 10:00) (77/56 - 98/72)  RR: 16 (04-16-18 @ 10:00)  SpO2: 99% (04-16-18 @ 10:00)  Wt(kg): --  I&O's Summary    15 Apr 2018 07:01  -  16 Apr 2018 07:00  --------------------------------------------------------  IN: 1693 mL / OUT: 400 mL / NET: 1293 mL      Weight 74.7 (04-15 @ 12:13)  General: AAO x 3,  NAD.  HEENT: moist mucous membranes, no pallor/cyanosis.  Neck: no JVD visible.  Cardiac: S1, S2. RRR. No murmurs   Respratory: rhonchi  Abdomen: soft. nontender. nondistended  Skin: no rashes.  Extremities: no LE edema b/l  Access: Permacath      DATA:                        8.6<L>  18.4<H> )-----------( 345      ( 16 Apr 2018 05:41 )             27.9<L>        144    |  101    |  69<H>  ----------------------------<  106<H>  Ca:9.2   (16 Apr 2018 05:41)  4.7     |  23     |  4.24<H>      eGFR if Non : 14 <L>  eGFR if : 16 <L>    TPro  6.9    /  Alb  2.8<L>  /  TBili  0.6    /  DBili  x      /  AST  19     /  ALT  17     /  AlkPhos  148<H>  16 Apr 2018 05:41      Vitamin D, 1, 25-Dihydroxy: 12.7 pg/mL <L> [19.9 - 79.3] (03-21 @ 14:56)  Vitamin D, 25-Hydroxy: 8.6 ng/mL <L> [30.0 - 80.0] (03-21 @ 14:56)                MEDICATIONS  (STANDING):  cefTRIAXone Injectable. 2000 milliGRAM(s) IV Push every 24 hours  chlorhexidine 0.12% Liquid 15 milliLiter(s) Swish and Spit two times a day  chlorhexidine 2% Cloths 1 Application(s) Topical daily  dextrose 5%. 1000 milliLiter(s) (50 mL/Hr) IV Continuous <Continuous>  dextrose 50% Injectable 12.5 Gram(s) IV Push once  dextrose 50% Injectable 25 Gram(s) IV Push once  dextrose 50% Injectable 25 Gram(s) IV Push once  ergocalciferol Drops 6000 Unit(s) Oral daily  escitalopram 5 milliGRAM(s) Oral daily  fluconAZOLE IVPB 200 milliGRAM(s) IV Intermittent every 24 hours  folic acid 1 milliGRAM(s) Oral daily  heparin  Infusion 1300 Unit(s)/Hr (13 mL/Hr) IV Continuous <Continuous>  insulin glargine Injectable (LANTUS) 10 Unit(s) SubCutaneous at bedtime  insulin regular  human corrective regimen sliding scale   SubCutaneous every 6 hours  midodrine 15 milliGRAM(s) Oral every 8 hours  modafinil 100 milliGRAM(s) Oral <User Schedule>  multivitamin 1 Tablet(s) Oral daily  norepinephrine Infusion 0.01 MICROgram(s)/kG/Min (1.5 mL/Hr) IV Continuous <Continuous>  pantoprazole   Suspension 40 milliGRAM(s) Oral daily  thiamine 100 milliGRAM(s) Oral daily    MEDICATIONS  (PRN):  acetaminophen    Suspension 650 milliGRAM(s) Oral every 6 hours PRN For Temp greater than 38 C (100.4 F)  acetaminophen    Suspension. 650 milliGRAM(s) Oral every 6 hours PRN Moderate Pain (4 - 6)  dextrose Gel 1 Dose(s) Oral once PRN Blood Glucose LESS THAN 70 milliGRAM(s)/deciliter  glucagon  Injectable 1 milliGRAM(s) IntraMuscular once PRN Glucose LESS THAN 70 milligrams/deciliter CC: SEPSIS DUE TO UNSPECIFIEDORGANISM, CELLULITIS OF      INTERVAL HISTORY:  trached  last night's events noted with runs of V tach and low-grade fever; requiring IV Levophed again      ROS: No chest pain, no sob, no abd pain. No n/v/d    PAST MEDICAL & SURGICAL HISTORY:  Type 2 diabetes mellitus with diabetic peripheral angiopathy without gangrene, with long-term curren  Essential hypertension, benign  Gout  Pacemaker  Chronic systolic heart failure  Myocardial infarction  No significant past surgical history      PHYSICAL EXAM:  T(C): 37.3 (04-16-18 @ 07:00), Max: 38.2 (04-15-18 @ 10:32)  HR: 92 (04-16-18 @ 10:00)  BP: 83/66 (04-16-18 @ 10:00) (77/56 - 98/72)  RR: 16 (04-16-18 @ 10:00)  SpO2: 99% (04-16-18 @ 10:00)  Wt(kg): --  I&O's Summary    15 Apr 2018 07:01  -  16 Apr 2018 07:00  --------------------------------------------------------  IN: 1693 mL / OUT: 400 mL / NET: 1293 mL      Weight 74.7 (04-15 @ 12:13)  General:  NAD.  HEENT: moist mucous membranes, no pallor/cyanosis.  Neck: no JVD visible.  Cardiac: S1, S2. RRR. No murmurs   Respratory: rhonchi  Abdomen: soft. nontender. nondistended  Skin: no rashes.  Extremities: no LE edema b/l  Access: Permacath      DATA:                        8.6<L>  18.4<H> )-----------( 345      ( 16 Apr 2018 05:41 )             27.9<L>        144    |  101    |  69<H>  ----------------------------<  106<H>  Ca:9.2   (16 Apr 2018 05:41)  4.7     |  23     |  4.24<H>      eGFR if Non : 14 <L>  eGFR if : 16 <L>    TPro  6.9    /  Alb  2.8<L>  /  TBili  0.6    /  DBili  x      /  AST  19     /  ALT  17     /  AlkPhos  148<H>  16 Apr 2018 05:41      Vitamin D, 1, 25-Dihydroxy: 12.7 pg/mL <L> [19.9 - 79.3] (03-21 @ 14:56)  Vitamin D, 25-Hydroxy: 8.6 ng/mL <L> [30.0 - 80.0] (03-21 @ 14:56)                MEDICATIONS  (STANDING):  cefTRIAXone Injectable. 2000 milliGRAM(s) IV Push every 24 hours  chlorhexidine 0.12% Liquid 15 milliLiter(s) Swish and Spit two times a day  chlorhexidine 2% Cloths 1 Application(s) Topical daily  dextrose 5%. 1000 milliLiter(s) (50 mL/Hr) IV Continuous <Continuous>  dextrose 50% Injectable 12.5 Gram(s) IV Push once  dextrose 50% Injectable 25 Gram(s) IV Push once  dextrose 50% Injectable 25 Gram(s) IV Push once  ergocalciferol Drops 6000 Unit(s) Oral daily  escitalopram 5 milliGRAM(s) Oral daily  fluconAZOLE IVPB 200 milliGRAM(s) IV Intermittent every 24 hours  folic acid 1 milliGRAM(s) Oral daily  heparin  Infusion 1300 Unit(s)/Hr (13 mL/Hr) IV Continuous <Continuous>  insulin glargine Injectable (LANTUS) 10 Unit(s) SubCutaneous at bedtime  insulin regular  human corrective regimen sliding scale   SubCutaneous every 6 hours  midodrine 15 milliGRAM(s) Oral every 8 hours  modafinil 100 milliGRAM(s) Oral <User Schedule>  multivitamin 1 Tablet(s) Oral daily  norepinephrine Infusion 0.01 MICROgram(s)/kG/Min (1.5 mL/Hr) IV Continuous <Continuous>  pantoprazole   Suspension 40 milliGRAM(s) Oral daily  thiamine 100 milliGRAM(s) Oral daily    MEDICATIONS  (PRN):  acetaminophen    Suspension 650 milliGRAM(s) Oral every 6 hours PRN For Temp greater than 38 C (100.4 F)  acetaminophen    Suspension. 650 milliGRAM(s) Oral every 6 hours PRN Moderate Pain (4 - 6)  dextrose Gel 1 Dose(s) Oral once PRN Blood Glucose LESS THAN 70 milliGRAM(s)/deciliter  glucagon  Injectable 1 milliGRAM(s) IntraMuscular once PRN Glucose LESS THAN 70 milligrams/deciliter

## 2018-04-17 LAB
ANION GAP SERPL CALC-SCNC: 21 MMOL/L — HIGH (ref 5–17)
APTT BLD: 65.6 SEC — HIGH (ref 27.5–37.4)
APTT BLD: 72.8 SEC — HIGH (ref 27.5–37.4)
APTT BLD: 85.9 SEC — HIGH (ref 27.5–37.4)
BUN SERPL-MCNC: 79 MG/DL — HIGH (ref 7–23)
CALCIUM SERPL-MCNC: 9.4 MG/DL — SIGNIFICANT CHANGE UP (ref 8.4–10.5)
CHLORIDE SERPL-SCNC: 100 MMOL/L — SIGNIFICANT CHANGE UP (ref 96–108)
CO2 SERPL-SCNC: 22 MMOL/L — SIGNIFICANT CHANGE UP (ref 22–31)
CREAT SERPL-MCNC: 4.93 MG/DL — HIGH (ref 0.5–1.3)
GLUCOSE BLDC GLUCOMTR-MCNC: 185 MG/DL — HIGH (ref 70–99)
GLUCOSE BLDC GLUCOMTR-MCNC: 210 MG/DL — HIGH (ref 70–99)
GLUCOSE BLDC GLUCOMTR-MCNC: 256 MG/DL — HIGH (ref 70–99)
GLUCOSE BLDC GLUCOMTR-MCNC: 280 MG/DL — HIGH (ref 70–99)
GLUCOSE BLDC GLUCOMTR-MCNC: 306 MG/DL — HIGH (ref 70–99)
GLUCOSE SERPL-MCNC: 185 MG/DL — HIGH (ref 70–99)
HCT VFR BLD CALC: 27.5 % — LOW (ref 39–50)
HGB BLD-MCNC: 8.5 G/DL — LOW (ref 13–17)
LYMPHOCYTES # BLD AUTO: 5 % — LOW (ref 13–44)
MAGNESIUM SERPL-MCNC: 2.2 MG/DL — SIGNIFICANT CHANGE UP (ref 1.6–2.6)
MCHC RBC-ENTMCNC: 29.6 PG — SIGNIFICANT CHANGE UP (ref 27–34)
MCHC RBC-ENTMCNC: 30.9 G/DL — LOW (ref 32–36)
MCV RBC AUTO: 95.8 FL — SIGNIFICANT CHANGE UP (ref 80–100)
MONOCYTES NFR BLD AUTO: 4 % — SIGNIFICANT CHANGE UP (ref 2–14)
NEUTROPHILS NFR BLD AUTO: 87 % — HIGH (ref 43–77)
PHOSPHATE SERPL-MCNC: 3.5 MG/DL — SIGNIFICANT CHANGE UP (ref 2.5–4.5)
PLATELET # BLD AUTO: 388 K/UL — SIGNIFICANT CHANGE UP (ref 150–400)
POTASSIUM SERPL-MCNC: 5 MMOL/L — SIGNIFICANT CHANGE UP (ref 3.5–5.3)
POTASSIUM SERPL-SCNC: 5 MMOL/L — SIGNIFICANT CHANGE UP (ref 3.5–5.3)
RBC # BLD: 2.87 M/UL — LOW (ref 4.2–5.8)
RBC # FLD: 18.8 % — HIGH (ref 10.3–16.9)
SODIUM SERPL-SCNC: 143 MMOL/L — SIGNIFICANT CHANGE UP (ref 135–145)
WBC # BLD: 16.9 K/UL — HIGH (ref 3.8–10.5)
WBC # FLD AUTO: 16.9 K/UL — HIGH (ref 3.8–10.5)

## 2018-04-17 PROCEDURE — 99233 SBSQ HOSP IP/OBS HIGH 50: CPT | Mod: GC

## 2018-04-17 PROCEDURE — 99233 SBSQ HOSP IP/OBS HIGH 50: CPT

## 2018-04-17 RX ORDER — INSULIN GLARGINE 100 [IU]/ML
16 INJECTION, SOLUTION SUBCUTANEOUS AT BEDTIME
Qty: 0 | Refills: 0 | Status: DISCONTINUED | OUTPATIENT
Start: 2018-04-17 | End: 2018-04-17

## 2018-04-17 RX ORDER — ALBUMIN HUMAN 25 %
50 VIAL (ML) INTRAVENOUS
Qty: 0 | Refills: 0 | Status: COMPLETED | OUTPATIENT
Start: 2018-04-17 | End: 2018-04-17

## 2018-04-17 RX ORDER — ATORVASTATIN CALCIUM 80 MG/1
80 TABLET, FILM COATED ORAL AT BEDTIME
Qty: 0 | Refills: 0 | Status: DISCONTINUED | OUTPATIENT
Start: 2018-04-17 | End: 2018-06-21

## 2018-04-17 RX ORDER — PSYLLIUM SEED (WITH DEXTROSE)
1 POWDER (GRAM) ORAL DAILY
Qty: 0 | Refills: 0 | Status: DISCONTINUED | OUTPATIENT
Start: 2018-04-17 | End: 2018-04-30

## 2018-04-17 RX ORDER — INSULIN GLARGINE 100 [IU]/ML
20 INJECTION, SOLUTION SUBCUTANEOUS AT BEDTIME
Qty: 0 | Refills: 0 | Status: DISCONTINUED | OUTPATIENT
Start: 2018-04-17 | End: 2018-04-18

## 2018-04-17 RX ORDER — HYDROCORTISONE 20 MG
25 TABLET ORAL EVERY 12 HOURS
Qty: 0 | Refills: 0 | Status: DISCONTINUED | OUTPATIENT
Start: 2018-04-17 | End: 2018-04-24

## 2018-04-17 RX ADMIN — Medication 1.5 MICROGRAM(S)/KG/MIN: at 09:33

## 2018-04-17 RX ADMIN — INSULIN HUMAN 6: 100 INJECTION, SOLUTION SUBCUTANEOUS at 00:34

## 2018-04-17 RX ADMIN — Medication 50 MILLILITER(S): at 12:10

## 2018-04-17 RX ADMIN — CEFTRIAXONE 2000 MILLIGRAM(S): 500 INJECTION, POWDER, FOR SOLUTION INTRAMUSCULAR; INTRAVENOUS at 17:45

## 2018-04-17 RX ADMIN — INSULIN HUMAN 8: 100 INJECTION, SOLUTION SUBCUTANEOUS at 17:10

## 2018-04-17 RX ADMIN — ERGOCALCIFEROL 6000 UNIT(S): 1.25 CAPSULE ORAL at 11:31

## 2018-04-17 RX ADMIN — Medication 81 MILLIGRAM(S): at 11:34

## 2018-04-17 RX ADMIN — Medication 1 PACKET(S): at 23:11

## 2018-04-17 RX ADMIN — FLUCONAZOLE 100 MILLIGRAM(S): 150 TABLET ORAL at 22:01

## 2018-04-17 RX ADMIN — Medication 25 MILLIGRAM(S): at 10:42

## 2018-04-17 RX ADMIN — CHLORHEXIDINE GLUCONATE 15 MILLILITER(S): 213 SOLUTION TOPICAL at 09:33

## 2018-04-17 RX ADMIN — HEPARIN SODIUM 17 UNIT(S)/HR: 5000 INJECTION INTRAVENOUS; SUBCUTANEOUS at 00:27

## 2018-04-17 RX ADMIN — MIDODRINE HYDROCHLORIDE 15 MILLIGRAM(S): 2.5 TABLET ORAL at 04:33

## 2018-04-17 RX ADMIN — INSULIN GLARGINE 20 UNIT(S): 100 INJECTION, SOLUTION SUBCUTANEOUS at 23:26

## 2018-04-17 RX ADMIN — Medication 1 TABLET(S): at 11:29

## 2018-04-17 RX ADMIN — HEPARIN SODIUM 17 UNIT(S)/HR: 5000 INJECTION INTRAVENOUS; SUBCUTANEOUS at 16:17

## 2018-04-17 RX ADMIN — ATORVASTATIN CALCIUM 80 MILLIGRAM(S): 80 TABLET, FILM COATED ORAL at 22:00

## 2018-04-17 RX ADMIN — ESCITALOPRAM OXALATE 5 MILLIGRAM(S): 10 TABLET, FILM COATED ORAL at 11:29

## 2018-04-17 RX ADMIN — CHLORHEXIDINE GLUCONATE 1 APPLICATION(S): 213 SOLUTION TOPICAL at 06:47

## 2018-04-17 RX ADMIN — CHLORHEXIDINE GLUCONATE 15 MILLILITER(S): 213 SOLUTION TOPICAL at 22:00

## 2018-04-17 RX ADMIN — Medication 1 MILLIGRAM(S): at 11:29

## 2018-04-17 RX ADMIN — MODAFINIL 100 MILLIGRAM(S): 200 TABLET ORAL at 06:45

## 2018-04-17 RX ADMIN — MODAFINIL 100 MILLIGRAM(S): 200 TABLET ORAL at 11:31

## 2018-04-17 RX ADMIN — Medication 25 MILLIGRAM(S): at 22:00

## 2018-04-17 RX ADMIN — MIDODRINE HYDROCHLORIDE 15 MILLIGRAM(S): 2.5 TABLET ORAL at 11:30

## 2018-04-17 RX ADMIN — Medication 100 MILLIGRAM(S): at 11:29

## 2018-04-17 RX ADMIN — Medication 50 MILLILITER(S): at 11:10

## 2018-04-17 RX ADMIN — MIDODRINE HYDROCHLORIDE 15 MILLIGRAM(S): 2.5 TABLET ORAL at 19:07

## 2018-04-17 RX ADMIN — INSULIN HUMAN 4: 100 INJECTION, SOLUTION SUBCUTANEOUS at 06:46

## 2018-04-17 RX ADMIN — INSULIN HUMAN 2: 100 INJECTION, SOLUTION SUBCUTANEOUS at 11:35

## 2018-04-17 RX ADMIN — INSULIN HUMAN 6: 100 INJECTION, SOLUTION SUBCUTANEOUS at 23:10

## 2018-04-17 RX ADMIN — Medication 50 MILLILITER(S): at 13:10

## 2018-04-17 RX ADMIN — PANTOPRAZOLE SODIUM 40 MILLIGRAM(S): 20 TABLET, DELAYED RELEASE ORAL at 11:29

## 2018-04-17 NOTE — PROGRESS NOTE ADULT - SUBJECTIVE AND OBJECTIVE BOX
INTERVAL HPI/OVERNIGHT EVENTS: Febrile to 100.7 at 6pm, blood cultures drawn. On Levophed 0.43 mcg/kg/min throughout night.     SUBJECTIVE: Patient seen and examined at bedside. Laying in bed, ill-appearing but NAD. Grimaces but does not open eyes to sternal rubs. Inconsistently nodding when asked questions. ROS not obtainable.     OBJECTIVE:    VITAL SIGNS:  ICU Vital Signs Last 24 Hrs  T(C): 36.7 (17 Apr 2018 10:00), Max: 38.2 (16 Apr 2018 17:52)  T(F): 98 (17 Apr 2018 10:00), Max: 100.7 (16 Apr 2018 17:52)  HR: 102 (17 Apr 2018 10:01) (83 - 124)  BP: 90/71 (17 Apr 2018 10:01) (72/57 - 97/79)  BP(mean): 77 (17 Apr 2018 10:01) (62 - 90)  ABP: --  ABP(mean): --  RR: 18 (17 Apr 2018 10:01) (9 - 47)  SpO2: 100% (17 Apr 2018 10:01) (99% - 100%)    Mode: CPAP with PS, FiO2: 40, PEEP: 5, PS: 8, MAP: 8, PIP: 15    04-16 @ 07:01  -  04-17 @ 07:00  --------------------------------------------------------  IN: 1726 mL / OUT: 250 mL / NET: 1476 mL    04-17 @ 07:01  -  04-17 @ 12:36  --------------------------------------------------------  IN: 224 mL / OUT: 0 mL / NET: 224 mL      CAPILLARY BLOOD GLUCOSE      POCT Blood Glucose.: 185 mg/dL (17 Apr 2018 11:26)      PHYSICAL EXAM:    General: Laying in bed, ill-appearing but NAD.   HEENT: NC/AT; PERRL, clear conjunctiva  Neck: supple  Respiratory: CTA b/l  Cardiovascular: +S1/S2; RRR  Abdomen: soft, NT/ND; +BS x4  Extremities: WWP, 2+ peripheral pulses b/l; no LE edema  Skin: normal color and turgor; no rash  Neurological:     MEDICATIONS:  MEDICATIONS  (STANDING):  albumin human 25% IVPB 50 milliLiter(s) IV Intermittent every 1 hour  aspirin  chewable 81 milliGRAM(s) Oral daily  atorvastatin 80 milliGRAM(s) Oral at bedtime  cefTRIAXone Injectable. 2000 milliGRAM(s) IV Push every 24 hours  chlorhexidine 0.12% Liquid 15 milliLiter(s) Swish and Spit two times a day  chlorhexidine 2% Cloths 1 Application(s) Topical daily  dextrose 5%. 1000 milliLiter(s) (50 mL/Hr) IV Continuous <Continuous>  dextrose 50% Injectable 12.5 Gram(s) IV Push once  dextrose 50% Injectable 25 Gram(s) IV Push once  dextrose 50% Injectable 25 Gram(s) IV Push once  ergocalciferol Drops 6000 Unit(s) Oral daily  escitalopram 5 milliGRAM(s) Oral daily  fluconAZOLE IVPB 200 milliGRAM(s) IV Intermittent every 24 hours  folic acid 1 milliGRAM(s) Oral daily  heparin  Infusion 1700 Unit(s)/Hr (17 mL/Hr) IV Continuous <Continuous>  hydrocortisone sodium succinate Injectable 25 milliGRAM(s) IV Push every 12 hours  insulin glargine Injectable (LANTUS) 10 Unit(s) SubCutaneous at bedtime  insulin regular  human corrective regimen sliding scale   SubCutaneous every 6 hours  midodrine 15 milliGRAM(s) Oral every 8 hours  modafinil 100 milliGRAM(s) Oral <User Schedule>  multivitamin 1 Tablet(s) Oral daily  norepinephrine Infusion 0.01 MICROgram(s)/kG/Min (1.5 mL/Hr) IV Continuous <Continuous>  pantoprazole   Suspension 40 milliGRAM(s) Oral daily  thiamine 100 milliGRAM(s) Oral daily    MEDICATIONS  (PRN):  acetaminophen    Suspension 650 milliGRAM(s) Oral every 6 hours PRN For Temp greater than 38 C (100.4 F)  acetaminophen    Suspension. 650 milliGRAM(s) Oral every 6 hours PRN Moderate Pain (4 - 6)  dextrose Gel 1 Dose(s) Oral once PRN Blood Glucose LESS THAN 70 milliGRAM(s)/deciliter  glucagon  Injectable 1 milliGRAM(s) IntraMuscular once PRN Glucose LESS THAN 70 milligrams/deciliter      ALLERGIES:  Allergies    No Known Allergies    Intolerances        LABS:                        8.5    16.9  )-----------( 388      ( 17 Apr 2018 06:59 )             27.5     04-17    143  |  100  |  79<H>  ----------------------------<  185<H>  5.0   |  22  |  4.93<H>    Ca    9.4      17 Apr 2018 06:58  Phos  3.5     04-17  Mg     2.2     04-17    TPro  6.9  /  Alb  2.8<L>  /  TBili  0.6  /  DBili  x   /  AST  19  /  ALT  17  /  AlkPhos  148<H>  04-16    PT/INR - ( 16 Apr 2018 05:41 )   PT: 15.6 sec;   INR: 1.40          PTT - ( 17 Apr 2018 07:29 )  PTT:65.6 sec      RADIOLOGY & ADDITIONAL TESTS: Reviewed. PGY-1 OFF SERVICE NOTE    HOSPITAL COURSE: 63M PMH HFrEF 10-15% (ischemic), MI, s/p AICD vs PPM, possible Afib, HTN, DM2 on insulin, possible CKD, and gout, who presents with a chief complaint of generalized weakness was found to be in septic shock likely 2/2 LE cellulitis. Pt was started on levophed for hypotension and to help renal perfusion. Pt was also started on dobutamine given low mix venous saturation however had to be discontinued given tachycardia. Pt also experienced tachycardia on Milrinone. Pt was started on Vanco/ Zosyn and completed total 11 days of abx. PT was started on solucortef 50 q6h given recent hx of steroid use. Gallium did not show only LLE  cellulitis. TTe with no vegetations. TTE could not be performed. Given worsening GILMER and low UO, renal was consulted. HD cath was placed and pt was started on CVVD. Pt ws transtioned to Hemodialysis however given persistent hypotension, pt was switched back to CVVHD. Pt was noted have b/l pulmonary effusion and required R chest tube placement with fluid studies showing exudative process. Given extensive cardiac hx and component of cardiogenic shock, cardiology was consulted. Pt was started on Vasopressin to improve blood pressure. Pt also stated on Isodil and Hydralazine but pt was not  able to tolerate the medications. Pt was started  on Hep gtt for Afib but was transiently placed on Argatroban given possibility of HIT. HIT was ruled out. pt was bridged to coumadin.  . Pt was to be transferred to CCU for further management of Cardiogenic shock however on 3/22, pt became unresponsive post A line placement. vital signs were normal. Stroke code was called. Pt intubated for airway protection. CT was negative MRI done 4 days later showed chronic infarcts with possibly small brainstem stroke per neuro but symptoms dont correlate. Pt was started on Modafinil however pt's arousability has not increased significantly. VEEG showed slowing but no seizures. in the interval, Pt had improvement in BP and was switched to Hd however he became hypotensive requiring Levophed. Pt started on Midodrine to wean off Levophed. Metoprolol d/c'd. ACTH stimulation test borderline normal-solucortef kept on. Pt also with worsening thrombocytopenia possibly from Zosyn/sepsis.  GOC discussion with family- Pt full code and to get Peg tube and Trach on 4/4.       INTERVAL HPI/OVERNIGHT EVENTS: Febrile to 100.7 at 6pm, blood cultures drawn. On Levophed 0.43 mcg/kg/min throughout night.     SUBJECTIVE: Patient seen and examined at bedside. Laying in bed, ill-appearing but NAD. Grimaces but does not open eyes to sternal rubs. Inconsistently nodding when asked questions. ROS not obtainable.     OBJECTIVE:    VITAL SIGNS:  ICU Vital Signs Last 24 Hrs  T(C): 36.7 (17 Apr 2018 10:00), Max: 38.2 (16 Apr 2018 17:52)  T(F): 98 (17 Apr 2018 10:00), Max: 100.7 (16 Apr 2018 17:52)  HR: 102 (17 Apr 2018 10:01) (83 - 124)  BP: 90/71 (17 Apr 2018 10:01) (72/57 - 97/79)  BP(mean): 77 (17 Apr 2018 10:01) (62 - 90)  ABP: --  ABP(mean): --  RR: 18 (17 Apr 2018 10:01) (9 - 47)  SpO2: 100% (17 Apr 2018 10:01) (99% - 100%)    Mode: CPAP with PS, FiO2: 40, PEEP: 5, PS: 8, MAP: 8, PIP: 15    04-16 @ 07:01  -  04-17 @ 07:00  --------------------------------------------------------  IN: 1726 mL / OUT: 250 mL / NET: 1476 mL    04-17 @ 07:01  -  04-17 @ 12:36  --------------------------------------------------------  IN: 224 mL / OUT: 0 mL / NET: 224 mL      CAPILLARY BLOOD GLUCOSE      POCT Blood Glucose.: 185 mg/dL (17 Apr 2018 11:26)      PHYSICAL EXAM:    General: Laying in bed, ill-appearing but NAD.   HEENT: NC/AT; MM dry, poor dentition  Neck: + trach in place, no visible secretions  Respiratory: Bilateral rhonchi, non-labored breathing on ventilator.   Cardiovascular: +S1/S2; RRR, no m/r/g  Abdomen: soft, mildly distended, grimaces to palpation in all 4 quadrants. No guarding. PEG in place w/ small amount of BRB around opening. +BS  MSK: Extremities stiff, but no joint swelling  Extremities: WWP, 1+ dependent edema to thigh  Vascular: 2+ peripheral pulses  Skin: normal color and turgor; no rash  Neurological: Occasionally nods to questions, non-verbal. Increased tone in all 4 extremities, + weak hand  of RUE, otherwise no movement of LUE or b/l LE on command or spontaneously.     MEDICATIONS:  MEDICATIONS  (STANDING):  albumin human 25% IVPB 50 milliLiter(s) IV Intermittent every 1 hour  aspirin  chewable 81 milliGRAM(s) Oral daily  atorvastatin 80 milliGRAM(s) Oral at bedtime  cefTRIAXone Injectable. 2000 milliGRAM(s) IV Push every 24 hours  chlorhexidine 0.12% Liquid 15 milliLiter(s) Swish and Spit two times a day  chlorhexidine 2% Cloths 1 Application(s) Topical daily  dextrose 5%. 1000 milliLiter(s) (50 mL/Hr) IV Continuous <Continuous>  dextrose 50% Injectable 12.5 Gram(s) IV Push once  dextrose 50% Injectable 25 Gram(s) IV Push once  dextrose 50% Injectable 25 Gram(s) IV Push once  ergocalciferol Drops 6000 Unit(s) Oral daily  escitalopram 5 milliGRAM(s) Oral daily  fluconAZOLE IVPB 200 milliGRAM(s) IV Intermittent every 24 hours  folic acid 1 milliGRAM(s) Oral daily  heparin  Infusion 1700 Unit(s)/Hr (17 mL/Hr) IV Continuous <Continuous>  hydrocortisone sodium succinate Injectable 25 milliGRAM(s) IV Push every 12 hours  insulin glargine Injectable (LANTUS) 10 Unit(s) SubCutaneous at bedtime  insulin regular  human corrective regimen sliding scale   SubCutaneous every 6 hours  midodrine 15 milliGRAM(s) Oral every 8 hours  modafinil 100 milliGRAM(s) Oral <User Schedule>  multivitamin 1 Tablet(s) Oral daily  norepinephrine Infusion 0.01 MICROgram(s)/kG/Min (1.5 mL/Hr) IV Continuous <Continuous>  pantoprazole   Suspension 40 milliGRAM(s) Oral daily  thiamine 100 milliGRAM(s) Oral daily    MEDICATIONS  (PRN):  acetaminophen    Suspension 650 milliGRAM(s) Oral every 6 hours PRN For Temp greater than 38 C (100.4 F)  acetaminophen    Suspension. 650 milliGRAM(s) Oral every 6 hours PRN Moderate Pain (4 - 6)  dextrose Gel 1 Dose(s) Oral once PRN Blood Glucose LESS THAN 70 milliGRAM(s)/deciliter  glucagon  Injectable 1 milliGRAM(s) IntraMuscular once PRN Glucose LESS THAN 70 milligrams/deciliter      ALLERGIES:  Allergies    No Known Allergies    Intolerances        LABS:                        8.5    16.9  )-----------( 388      ( 17 Apr 2018 06:59 )             27.5     04-17    143  |  100  |  79<H>  ----------------------------<  185<H>  5.0   |  22  |  4.93<H>    Ca    9.4      17 Apr 2018 06:58  Phos  3.5     04-17  Mg     2.2     04-17    TPro  6.9  /  Alb  2.8<L>  /  TBili  0.6  /  DBili  x   /  AST  19  /  ALT  17  /  AlkPhos  148<H>  04-16    PT/INR - ( 16 Apr 2018 05:41 )   PT: 15.6 sec;   INR: 1.40          PTT - ( 17 Apr 2018 07:29 )  PTT:65.6 sec      RADIOLOGY & ADDITIONAL TESTS: Reviewed. PGY-1 OFF SERVICE NOTE    HOSPITAL COURSE: 63M PMH HFrEF 10-15% (ischemic), MI, s/p AICD vs PPM, possible Afib, HTN, DM2 on insulin, possible CKD, and gout, who presents with a chief complaint of generalized weakness was found to be in septic shock likely 2/2 LE cellulitis. Pt was started on levophed for hypotension and to help renal perfusion. Pt was also started on dobutamine given low mix venous saturation however had to be discontinued given tachycardia. Pt also experienced tachycardia on Milrinone. Pt was started on Vanco/ Zosyn and completed total 11 days of abx. PT was started on solucortef 50 q6h given recent hx of steroid use. Gallium did not show only LLE  cellulitis. TTe with no vegetations. TTE could not be performed. Given worsening GILMER and low UO, renal was consulted. HD cath was placed and pt was started on CVVD. Pt ws transtioned to Hemodialysis however given persistent hypotension, pt was switched back to CVVHD. Pt was noted have b/l pulmonary effusion and required R chest tube placement with fluid studies showing exudative process. Given extensive cardiac hx and component of cardiogenic shock, cardiology was consulted. Pt was started on Vasopressin to improve blood pressure. Pt also stated on Isodil and Hydralazine but pt was not  able to tolerate the medications. Pt was started  on Hep gtt for Afib but was transiently placed on Argatroban given possibility of HIT. HIT was ruled out. pt was bridged to coumadin.  . Pt was to be transferred to CCU for further management of Cardiogenic shock however on 3/22, pt became unresponsive post A line placement. vital signs were normal. Stroke code was called. Pt intubated for airway protection. CT was negative MRI done 4 days later showed chronic infarcts with possibly small brainstem stroke per neuro but symptoms dont correlate. Pt was started on Modafinil however pt's arousability has not increased significantly. VEEG showed slowing but no seizures.     INTERVAL HPI/OVERNIGHT EVENTS: Febrile to 100.7 at 6pm, blood cultures drawn. On Levophed 0.43 mcg/kg/min throughout night.     SUBJECTIVE: Patient seen and examined at bedside. Laying in bed, ill-appearing but NAD. Grimaces but does not open eyes to sternal rubs. Inconsistently nodding when asked questions. ROS not obtainable.     OBJECTIVE:    VITAL SIGNS:  ICU Vital Signs Last 24 Hrs  T(C): 36.7 (17 Apr 2018 10:00), Max: 38.2 (16 Apr 2018 17:52)  T(F): 98 (17 Apr 2018 10:00), Max: 100.7 (16 Apr 2018 17:52)  HR: 102 (17 Apr 2018 10:01) (83 - 124)  BP: 90/71 (17 Apr 2018 10:01) (72/57 - 97/79)  BP(mean): 77 (17 Apr 2018 10:01) (62 - 90)  ABP: --  ABP(mean): --  RR: 18 (17 Apr 2018 10:01) (9 - 47)  SpO2: 100% (17 Apr 2018 10:01) (99% - 100%)    Mode: CPAP with PS, FiO2: 40, PEEP: 5, PS: 8, MAP: 8, PIP: 15    04-16 @ 07:01  -  04-17 @ 07:00  --------------------------------------------------------  IN: 1726 mL / OUT: 250 mL / NET: 1476 mL    04-17 @ 07:01  -  04-17 @ 12:36  --------------------------------------------------------  IN: 224 mL / OUT: 0 mL / NET: 224 mL      CAPILLARY BLOOD GLUCOSE      POCT Blood Glucose.: 185 mg/dL (17 Apr 2018 11:26)      PHYSICAL EXAM:    General: Laying in bed, ill-appearing but NAD.   HEENT: NC/AT; MM dry, poor dentition  Neck: + trach in place, no visible secretions  Respiratory: Bilateral rhonchi, non-labored breathing on ventilator.   Cardiovascular: +S1/S2; RRR, no m/r/g  Abdomen: soft, mildly distended, grimaces to palpation in all 4 quadrants. No guarding. PEG in place w/ small amount of BRB around opening. +BS  MSK: Extremities stiff, but no joint swelling  Extremities: WWP, 1+ dependent edema to thigh  Vascular: 2+ peripheral pulses  Skin: normal color and turgor; no rash  Neurological: Occasionally nods to questions, non-verbal. Increased tone in all 4 extremities, + weak hand  of RUE, otherwise no movement of LUE or b/l LE on command or spontaneously.     MEDICATIONS:  MEDICATIONS  (STANDING):  albumin human 25% IVPB 50 milliLiter(s) IV Intermittent every 1 hour  aspirin  chewable 81 milliGRAM(s) Oral daily  atorvastatin 80 milliGRAM(s) Oral at bedtime  cefTRIAXone Injectable. 2000 milliGRAM(s) IV Push every 24 hours  chlorhexidine 0.12% Liquid 15 milliLiter(s) Swish and Spit two times a day  chlorhexidine 2% Cloths 1 Application(s) Topical daily  dextrose 5%. 1000 milliLiter(s) (50 mL/Hr) IV Continuous <Continuous>  dextrose 50% Injectable 12.5 Gram(s) IV Push once  dextrose 50% Injectable 25 Gram(s) IV Push once  dextrose 50% Injectable 25 Gram(s) IV Push once  ergocalciferol Drops 6000 Unit(s) Oral daily  escitalopram 5 milliGRAM(s) Oral daily  fluconAZOLE IVPB 200 milliGRAM(s) IV Intermittent every 24 hours  folic acid 1 milliGRAM(s) Oral daily  heparin  Infusion 1700 Unit(s)/Hr (17 mL/Hr) IV Continuous <Continuous>  hydrocortisone sodium succinate Injectable 25 milliGRAM(s) IV Push every 12 hours  insulin glargine Injectable (LANTUS) 10 Unit(s) SubCutaneous at bedtime  insulin regular  human corrective regimen sliding scale   SubCutaneous every 6 hours  midodrine 15 milliGRAM(s) Oral every 8 hours  modafinil 100 milliGRAM(s) Oral <User Schedule>  multivitamin 1 Tablet(s) Oral daily  norepinephrine Infusion 0.01 MICROgram(s)/kG/Min (1.5 mL/Hr) IV Continuous <Continuous>  pantoprazole   Suspension 40 milliGRAM(s) Oral daily  thiamine 100 milliGRAM(s) Oral daily    MEDICATIONS  (PRN):  acetaminophen    Suspension 650 milliGRAM(s) Oral every 6 hours PRN For Temp greater than 38 C (100.4 F)  acetaminophen    Suspension. 650 milliGRAM(s) Oral every 6 hours PRN Moderate Pain (4 - 6)  dextrose Gel 1 Dose(s) Oral once PRN Blood Glucose LESS THAN 70 milliGRAM(s)/deciliter  glucagon  Injectable 1 milliGRAM(s) IntraMuscular once PRN Glucose LESS THAN 70 milligrams/deciliter      ALLERGIES:  Allergies    No Known Allergies    Intolerances        LABS:                        8.5    16.9  )-----------( 388      ( 17 Apr 2018 06:59 )             27.5     04-17    143  |  100  |  79<H>  ----------------------------<  185<H>  5.0   |  22  |  4.93<H>    Ca    9.4      17 Apr 2018 06:58  Phos  3.5     04-17  Mg     2.2     04-17    TPro  6.9  /  Alb  2.8<L>  /  TBili  0.6  /  DBili  x   /  AST  19  /  ALT  17  /  AlkPhos  148<H>  04-16    PT/INR - ( 16 Apr 2018 05:41 )   PT: 15.6 sec;   INR: 1.40          PTT - ( 17 Apr 2018 07:29 )  PTT:65.6 sec      RADIOLOGY & ADDITIONAL TESTS: Reviewed. PGY-1 OFF SERVICE NOTE    HOSPITAL COURSE: 63M PMH HFrEF 10-15% (ischemic), MI, s/p AICD vs PPM, possible Afib, HTN, DM2 on insulin, possible CKD, and gout, who was admitted for septic shock 2/2 LE cellulitis. Pt was started on levophed for hypotension and to help renal perfusion. Pt was also started on dobutamine given low mix venous saturation however had to be discontinued given tachycardia. Pt also experienced tachycardia on Milrinone. Pt was started on Vanco/ Zosyn and completed total 11 days of abx. PT was started on solucortef 50 q6h given recent hx of steroid use. Gallium scan showed only LLE  cellulitis. TTE with no vegetations. Given worsening GLIMER and low UO, renal was consulted. HD cath was placed and pt was started on CVVD and eventually transitioned to intermittent HD Pt was noted have b/l pulmonary effusion and required R chest tube placement with fluid studies showing exudative process. Given extensive cardiac hx and component of cardiogenic shock, cardiology was consulted. Pt was started on Vasopressin to improve blood pressure. Pt also stated on Isodil and Hydralazine but pt was not  able to tolerate the medications. Pt was started  on Hep gtt for Afib but was transiently placed on Argatroban given possibility of HIT. HIT was ruled out and patient was bridged to coumadin. Pt was stable for transfer to CCU for further management of cardiogenic shock however on 3/22, pt became unresponsive post A line placement. Vital signs were normal during event. Stroke code was called and he was intubated for airway protection. CT was negative MRI done 4 days later showed chronic infarcts with possibly small brainstem stroke per neurology read. He was started on Modafinil however mental status did not improve significantly. VEEG showed slowing but no seizures. After GOC discussions with family patient had trach and PEG on 4/4 with plans to be discharged to [facility] when stable.  Repeat CT head performed on 4/11 due to concern for new LUE paralysis, CT showed only chronic infarcts. On 4/8 patient was found to be fungemic with candida tropicalis and started on micafungin, then switched to fluconazole on 4/13 when sensitivities resulted. CTAP performed but source of candidemia was not found. HD Permacath was removed 4/8 and was replaced on 4/12 when surveillance cultures had been negative for 48 hours. However overnight on 4/12 patient spiked fever again and blood, urine and sputum cultures returned positive for Klebsiella. He was started on     INTERVAL HPI/OVERNIGHT EVENTS: Febrile to 100.7 at 6pm, blood cultures drawn. On Levophed 0.43 mcg/kg/min throughout night.     SUBJECTIVE: Patient seen and examined at bedside. Laying in bed, ill-appearing but NAD. Grimaces but does not open eyes to sternal rubs. Inconsistently nodding when asked questions. ROS not obtainable.     OBJECTIVE:    VITAL SIGNS:  ICU Vital Signs Last 24 Hrs  T(C): 36.7 (17 Apr 2018 10:00), Max: 38.2 (16 Apr 2018 17:52)  T(F): 98 (17 Apr 2018 10:00), Max: 100.7 (16 Apr 2018 17:52)  HR: 102 (17 Apr 2018 10:01) (83 - 124)  BP: 90/71 (17 Apr 2018 10:01) (72/57 - 97/79)  BP(mean): 77 (17 Apr 2018 10:01) (62 - 90)  ABP: --  ABP(mean): --  RR: 18 (17 Apr 2018 10:01) (9 - 47)  SpO2: 100% (17 Apr 2018 10:01) (99% - 100%)    Mode: CPAP with PS, FiO2: 40, PEEP: 5, PS: 8, MAP: 8, PIP: 15    04-16 @ 07:01  -  04-17 @ 07:00  --------------------------------------------------------  IN: 1726 mL / OUT: 250 mL / NET: 1476 mL    04-17 @ 07:01  -  04-17 @ 12:36  --------------------------------------------------------  IN: 224 mL / OUT: 0 mL / NET: 224 mL      CAPILLARY BLOOD GLUCOSE      POCT Blood Glucose.: 185 mg/dL (17 Apr 2018 11:26)      PHYSICAL EXAM:    General: Laying in bed, ill-appearing but NAD.   HEENT: NC/AT; MM dry, poor dentition  Neck: + trach in place, no visible secretions  Respiratory: Bilateral rhonchi, non-labored breathing on ventilator.   Cardiovascular: +S1/S2; RRR, no m/r/g  Abdomen: soft, mildly distended, grimaces to palpation in all 4 quadrants. No guarding. PEG in place w/ small amount of BRB around opening. +BS  MSK: Extremities stiff, but no joint swelling  Extremities: WWP, 1+ dependent edema to thigh  Vascular: 2+ peripheral pulses  Skin: normal color and turgor; no rash  Neurological: Occasionally nods to questions, non-verbal. Increased tone in all 4 extremities, + weak hand  of RUE, otherwise no movement of LUE or b/l LE on command or spontaneously.     MEDICATIONS:  MEDICATIONS  (STANDING):  albumin human 25% IVPB 50 milliLiter(s) IV Intermittent every 1 hour  aspirin  chewable 81 milliGRAM(s) Oral daily  atorvastatin 80 milliGRAM(s) Oral at bedtime  cefTRIAXone Injectable. 2000 milliGRAM(s) IV Push every 24 hours  chlorhexidine 0.12% Liquid 15 milliLiter(s) Swish and Spit two times a day  chlorhexidine 2% Cloths 1 Application(s) Topical daily  dextrose 5%. 1000 milliLiter(s) (50 mL/Hr) IV Continuous <Continuous>  dextrose 50% Injectable 12.5 Gram(s) IV Push once  dextrose 50% Injectable 25 Gram(s) IV Push once  dextrose 50% Injectable 25 Gram(s) IV Push once  ergocalciferol Drops 6000 Unit(s) Oral daily  escitalopram 5 milliGRAM(s) Oral daily  fluconAZOLE IVPB 200 milliGRAM(s) IV Intermittent every 24 hours  folic acid 1 milliGRAM(s) Oral daily  heparin  Infusion 1700 Unit(s)/Hr (17 mL/Hr) IV Continuous <Continuous>  hydrocortisone sodium succinate Injectable 25 milliGRAM(s) IV Push every 12 hours  insulin glargine Injectable (LANTUS) 10 Unit(s) SubCutaneous at bedtime  insulin regular  human corrective regimen sliding scale   SubCutaneous every 6 hours  midodrine 15 milliGRAM(s) Oral every 8 hours  modafinil 100 milliGRAM(s) Oral <User Schedule>  multivitamin 1 Tablet(s) Oral daily  norepinephrine Infusion 0.01 MICROgram(s)/kG/Min (1.5 mL/Hr) IV Continuous <Continuous>  pantoprazole   Suspension 40 milliGRAM(s) Oral daily  thiamine 100 milliGRAM(s) Oral daily    MEDICATIONS  (PRN):  acetaminophen    Suspension 650 milliGRAM(s) Oral every 6 hours PRN For Temp greater than 38 C (100.4 F)  acetaminophen    Suspension. 650 milliGRAM(s) Oral every 6 hours PRN Moderate Pain (4 - 6)  dextrose Gel 1 Dose(s) Oral once PRN Blood Glucose LESS THAN 70 milliGRAM(s)/deciliter  glucagon  Injectable 1 milliGRAM(s) IntraMuscular once PRN Glucose LESS THAN 70 milligrams/deciliter      ALLERGIES:  Allergies    No Known Allergies    Intolerances        LABS:                        8.5    16.9  )-----------( 388      ( 17 Apr 2018 06:59 )             27.5     04-17    143  |  100  |  79<H>  ----------------------------<  185<H>  5.0   |  22  |  4.93<H>    Ca    9.4      17 Apr 2018 06:58  Phos  3.5     04-17  Mg     2.2     04-17    TPro  6.9  /  Alb  2.8<L>  /  TBili  0.6  /  DBili  x   /  AST  19  /  ALT  17  /  AlkPhos  148<H>  04-16    PT/INR - ( 16 Apr 2018 05:41 )   PT: 15.6 sec;   INR: 1.40          PTT - ( 17 Apr 2018 07:29 )  PTT:65.6 sec      RADIOLOGY & ADDITIONAL TESTS: Reviewed. PGY-1 OFF SERVICE NOTE    HOSPITAL COURSE: 63M PMH HFrEF 10-15% (ischemic), MI, s/p AICD vs PPM, possible Afib, HTN, DM2 on insulin, possible CKD, and gout, who was admitted for septic shock 2/2 LE cellulitis. Pt was started on levophed for hypotension and to help renal perfusion. Pt was also started on dobutamine given low mix venous saturation however had to be discontinued given tachycardia. Pt also experienced tachycardia on Milrinone. Pt was started on Vanco/ Zosyn and completed total 11 days of abx. PT was started on solucortef 50 q6h given recent hx of steroid use. Gallium scan showed only LLE  cellulitis. TTE with no vegetations. Given worsening GILMER and low UO, renal was consulted. HD cath was placed and pt was started on CVVD and eventually transitioned to intermittent HD Pt was noted have b/l pulmonary effusion and required R chest tube placement with fluid studies showing exudative process. Given extensive cardiac hx and component of cardiogenic shock, cardiology was consulted. Pt was started on Vasopressin to improve blood pressure. Pt also stated on Isodil and Hydralazine but pt was not  able to tolerate the medications. Pt was started  on Hep gtt for Afib but was transiently placed on Argatroban given possibility of HIT. HIT was ruled out and patient was bridged to coumadin. Pt was stable for transfer to CCU for further management of cardiogenic shock however on 3/22, pt became unresponsive post A line placement. Vital signs were normal during event. Stroke code was called and he was intubated for airway protection. CT was negative MRI done 4 days later showed chronic infarcts with possibly small brainstem stroke per neurology read. He was started on Modafinil however mental status did not improve significantly. VEEG showed slowing but no seizures. After GOC discussions with family patient had trach and PEG on 4/4 with plans to be discharged to [facility] when stable.  Repeat CT head performed on 4/11 due to concern for new LUE paralysis, CT showed only chronic infarcts. On 4/8 patient was found to be fungemic with candida tropicalis and started on micafungin, then switched to fluconazole on 4/13 when sensitivities resulted. RUKHSANA was negative for vegetations or infection of AICD. CTAP also performed but source of candidemia was not found. HD Permacath was removed 4/8 and was replaced on 4/12 when surveillance cultures had been negative for 48 hours. However overnight on 4/12 patient spiked fever again and blood, urine and sputum cultures returned positive for Klebsiella. He was started on meropenem for 1 day, then switched to Zosyn for 2 days and finally placed on ceftriaxone when sensitivities returned on 4/15. Patient continued to have low grade fevers but surveillance cultures have all been negative. Patient also requiring low dose levophed to maintain MAPs, hydrocortisone was restarted on 4/17.     INTERVAL HPI/OVERNIGHT EVENTS: Febrile to 100.7 at 6pm, blood cultures drawn. On Levophed 0.43 mcg/kg/min throughout night.     SUBJECTIVE: Patient seen and examined at bedside. Laying in bed, ill-appearing but NAD. Grimaces but does not open eyes to sternal rubs. Inconsistently nodding when asked questions. ROS not obtainable.     OBJECTIVE:    VITAL SIGNS:  ICU Vital Signs Last 24 Hrs  T(C): 36.7 (17 Apr 2018 10:00), Max: 38.2 (16 Apr 2018 17:52)  T(F): 98 (17 Apr 2018 10:00), Max: 100.7 (16 Apr 2018 17:52)  HR: 102 (17 Apr 2018 10:01) (83 - 124)  BP: 90/71 (17 Apr 2018 10:01) (72/57 - 97/79)  BP(mean): 77 (17 Apr 2018 10:01) (62 - 90)  ABP: --  ABP(mean): --  RR: 18 (17 Apr 2018 10:01) (9 - 47)  SpO2: 100% (17 Apr 2018 10:01) (99% - 100%)    Mode: CPAP with PS, FiO2: 40, PEEP: 5, PS: 8, MAP: 8, PIP: 15    04-16 @ 07:01  -  04-17 @ 07:00  --------------------------------------------------------  IN: 1726 mL / OUT: 250 mL / NET: 1476 mL    04-17 @ 07:01  -  04-17 @ 12:36  --------------------------------------------------------  IN: 224 mL / OUT: 0 mL / NET: 224 mL      CAPILLARY BLOOD GLUCOSE      POCT Blood Glucose.: 185 mg/dL (17 Apr 2018 11:26)      PHYSICAL EXAM:    General: Laying in bed, ill-appearing but NAD.   HEENT: NC/AT; MM dry, poor dentition  Neck: + trach in place, no visible secretions  Respiratory: Bilateral rhonchi, non-labored breathing on ventilator.   Cardiovascular: +S1/S2; RRR, no m/r/g  Abdomen: soft, mildly distended, grimaces to palpation in all 4 quadrants. No guarding. PEG in place w/ small amount of BRB around opening. +BS  MSK: Extremities stiff, but no joint swelling  Extremities: WWP, 1+ dependent edema to thigh  Vascular: 2+ peripheral pulses  Skin: normal color and turgor; no rash  Neurological: Occasionally nods to questions, non-verbal. Increased tone in all 4 extremities, + weak hand  of RUE, otherwise no movement of LUE or b/l LE on command or spontaneously.     MEDICATIONS:  MEDICATIONS  (STANDING):  albumin human 25% IVPB 50 milliLiter(s) IV Intermittent every 1 hour  aspirin  chewable 81 milliGRAM(s) Oral daily  atorvastatin 80 milliGRAM(s) Oral at bedtime  cefTRIAXone Injectable. 2000 milliGRAM(s) IV Push every 24 hours  chlorhexidine 0.12% Liquid 15 milliLiter(s) Swish and Spit two times a day  chlorhexidine 2% Cloths 1 Application(s) Topical daily  dextrose 5%. 1000 milliLiter(s) (50 mL/Hr) IV Continuous <Continuous>  dextrose 50% Injectable 12.5 Gram(s) IV Push once  dextrose 50% Injectable 25 Gram(s) IV Push once  dextrose 50% Injectable 25 Gram(s) IV Push once  ergocalciferol Drops 6000 Unit(s) Oral daily  escitalopram 5 milliGRAM(s) Oral daily  fluconAZOLE IVPB 200 milliGRAM(s) IV Intermittent every 24 hours  folic acid 1 milliGRAM(s) Oral daily  heparin  Infusion 1700 Unit(s)/Hr (17 mL/Hr) IV Continuous <Continuous>  hydrocortisone sodium succinate Injectable 25 milliGRAM(s) IV Push every 12 hours  insulin glargine Injectable (LANTUS) 10 Unit(s) SubCutaneous at bedtime  insulin regular  human corrective regimen sliding scale   SubCutaneous every 6 hours  midodrine 15 milliGRAM(s) Oral every 8 hours  modafinil 100 milliGRAM(s) Oral <User Schedule>  multivitamin 1 Tablet(s) Oral daily  norepinephrine Infusion 0.01 MICROgram(s)/kG/Min (1.5 mL/Hr) IV Continuous <Continuous>  pantoprazole   Suspension 40 milliGRAM(s) Oral daily  thiamine 100 milliGRAM(s) Oral daily    MEDICATIONS  (PRN):  acetaminophen    Suspension 650 milliGRAM(s) Oral every 6 hours PRN For Temp greater than 38 C (100.4 F)  acetaminophen    Suspension. 650 milliGRAM(s) Oral every 6 hours PRN Moderate Pain (4 - 6)  dextrose Gel 1 Dose(s) Oral once PRN Blood Glucose LESS THAN 70 milliGRAM(s)/deciliter  glucagon  Injectable 1 milliGRAM(s) IntraMuscular once PRN Glucose LESS THAN 70 milligrams/deciliter      ALLERGIES:  Allergies    No Known Allergies    Intolerances        LABS:                        8.5    16.9  )-----------( 388      ( 17 Apr 2018 06:59 )             27.5     04-17    143  |  100  |  79<H>  ----------------------------<  185<H>  5.0   |  22  |  4.93<H>    Ca    9.4      17 Apr 2018 06:58  Phos  3.5     04-17  Mg     2.2     04-17    TPro  6.9  /  Alb  2.8<L>  /  TBili  0.6  /  DBili  x   /  AST  19  /  ALT  17  /  AlkPhos  148<H>  04-16    PT/INR - ( 16 Apr 2018 05:41 )   PT: 15.6 sec;   INR: 1.40          PTT - ( 17 Apr 2018 07:29 )  PTT:65.6 sec      RADIOLOGY & ADDITIONAL TESTS: Reviewed. PGY-1 OFF SERVICE NOTE    HOSPITAL COURSE: 63M PMH HFrEF 10-15% (ischemic), MI (STEMI per [facility] records 7/17), s/p AICD, Afib, HTN, DM2 on insulin, CKD, and gout admitted for septic shock 2/2 LE cellulitis. Pt was started on levophed for hypotension and to help renal perfusion. Pt was also started on dobutamine given low mix venous saturation however had to be discontinued given tachycardia. Pt also experienced tachycardia on Milrinone. Pt was started on Vanco/ Zosyn and completed total 11 days of abx. PT was started on solucortef 50 q6h given recent hx of steroid use. Gallium scan showed only LLE  cellulitis. TTE with no vegetations. Given worsening GILMER and low UO, renal was consulted. HD cath was placed and pt was started on CVVD and eventually transitioned to intermittent HD. Pt was noted have b/l pulmonary effusion and required R chest tube placement with fluid studies showing exudative process. Given extensive cardiac hx and component of cardiogenic shock, cardiology was consulted. Pt was started on Vasopressin to improve blood pressure. Pt also stated on Isordil and Hydralazine but pt was not  able to tolerate the medications. Pt was started  on Hep gtt for Afib but was tthen placed on Argatroban given possibility of HIT. HIT was ruled out and patient was bridged to coumadin. Pt was stable for transfer to CCU for further management of cardiogenic shock however on 3/22, pt became unresponsive post A line placement. Vital signs were normal during event. Stroke code was called and he was intubated for airway protection. CT was negative MRI done 4 days later showed chronic infarcts with possible small brainstem stroke per neurology read. He was started on Modafinil however mental status did not improve significantly. VEEG showed slowing but no seizures. After GOC discussions with family patient had trach and PEG on 4/4 with plans to be discharged to [facility] when stable. On 4/8 patient was found to be fungemic with candida tropicalis and started on micafungin, then switched to fluconazole on 4/13 when sensitivities resulted. RUKHSANA was negative for vegetations or infection of AICD. CTAP also performed given transamnitis/ elevated bili but source of candidemia was not found. HD Permacath was removed 4/8 and was replaced on 4/12 when surveillance cultures had been negative for 48 hours. However overnight on 4/12 patient spiked fever again and blood, urine and sputum cultures returned positive for Klebsiella. He was started on meropenem for 1 day, then switched to Zosyn for 2 days and finally placed on ceftriaxone when sensitivities returned on 4/15. Patient restarted on hydrocortisone in attempt to wean off pressors. Thrombocytopenia resolved and patient restarted on heparin gtt for A-fib.Patient continues to have low grade fevers but surveillance cultures have all been negative, low threshold to remove HD catheter if patient with s/s of sepsis.      INTERVAL HPI/OVERNIGHT EVENTS: Febrile to 100.7 at 6pm, blood cultures drawn. On Levophed 0.43 mcg/kg/min throughout night.     SUBJECTIVE: Patient seen and examined at bedside. Laying in bed, ill-appearing but NAD. Grimaces but does not open eyes to sternal rubs. Inconsistently nodding when asked questions. ROS not obtainable.     OBJECTIVE:    VITAL SIGNS:  ICU Vital Signs Last 24 Hrs  T(C): 36.7 (17 Apr 2018 10:00), Max: 38.2 (16 Apr 2018 17:52)  T(F): 98 (17 Apr 2018 10:00), Max: 100.7 (16 Apr 2018 17:52)  HR: 102 (17 Apr 2018 10:01) (83 - 124)  BP: 90/71 (17 Apr 2018 10:01) (72/57 - 97/79)  BP(mean): 77 (17 Apr 2018 10:01) (62 - 90)  ABP: --  ABP(mean): --  RR: 18 (17 Apr 2018 10:01) (9 - 47)  SpO2: 100% (17 Apr 2018 10:01) (99% - 100%)    Mode: CPAP with PS, FiO2: 40, PEEP: 5, PS: 8, MAP: 8, PIP: 15    04-16 @ 07:01  -  04-17 @ 07:00  --------------------------------------------------------  IN: 1726 mL / OUT: 250 mL / NET: 1476 mL    04-17 @ 07:01  -  04-17 @ 12:36  --------------------------------------------------------  IN: 224 mL / OUT: 0 mL / NET: 224 mL      CAPILLARY BLOOD GLUCOSE      POCT Blood Glucose.: 185 mg/dL (17 Apr 2018 11:26)      PHYSICAL EXAM:    General: Laying in bed, ill-appearing but NAD.   HEENT: NC/AT; MM dry, poor dentition  Neck: + trach in place, no visible secretions  Respiratory: Bilateral rhonchi, non-labored breathing on ventilator.   Cardiovascular: +S1/S2; RRR, no m/r/g  Abdomen: soft, mildly distended, grimaces to palpation in all 4 quadrants. No guarding. PEG in place w/ small amount of BRB around opening. +BS  MSK: Extremities stiff, but no joint swelling  Extremities: WWP, 1+ dependent edema to thigh  Vascular: 2+ peripheral pulses  Skin: normal color and turgor; no rash  Neurological: Occasionally nods to questions, non-verbal. Increased tone in all 4 extremities, + weak hand  of RUE, otherwise no movement of LUE or b/l LE on command or spontaneously.     MEDICATIONS:  MEDICATIONS  (STANDING):  albumin human 25% IVPB 50 milliLiter(s) IV Intermittent every 1 hour  aspirin  chewable 81 milliGRAM(s) Oral daily  atorvastatin 80 milliGRAM(s) Oral at bedtime  cefTRIAXone Injectable. 2000 milliGRAM(s) IV Push every 24 hours  chlorhexidine 0.12% Liquid 15 milliLiter(s) Swish and Spit two times a day  chlorhexidine 2% Cloths 1 Application(s) Topical daily  dextrose 5%. 1000 milliLiter(s) (50 mL/Hr) IV Continuous <Continuous>  dextrose 50% Injectable 12.5 Gram(s) IV Push once  dextrose 50% Injectable 25 Gram(s) IV Push once  dextrose 50% Injectable 25 Gram(s) IV Push once  ergocalciferol Drops 6000 Unit(s) Oral daily  escitalopram 5 milliGRAM(s) Oral daily  fluconAZOLE IVPB 200 milliGRAM(s) IV Intermittent every 24 hours  folic acid 1 milliGRAM(s) Oral daily  heparin  Infusion 1700 Unit(s)/Hr (17 mL/Hr) IV Continuous <Continuous>  hydrocortisone sodium succinate Injectable 25 milliGRAM(s) IV Push every 12 hours  insulin glargine Injectable (LANTUS) 10 Unit(s) SubCutaneous at bedtime  insulin regular  human corrective regimen sliding scale   SubCutaneous every 6 hours  midodrine 15 milliGRAM(s) Oral every 8 hours  modafinil 100 milliGRAM(s) Oral <User Schedule>  multivitamin 1 Tablet(s) Oral daily  norepinephrine Infusion 0.01 MICROgram(s)/kG/Min (1.5 mL/Hr) IV Continuous <Continuous>  pantoprazole   Suspension 40 milliGRAM(s) Oral daily  thiamine 100 milliGRAM(s) Oral daily    MEDICATIONS  (PRN):  acetaminophen    Suspension 650 milliGRAM(s) Oral every 6 hours PRN For Temp greater than 38 C (100.4 F)  acetaminophen    Suspension. 650 milliGRAM(s) Oral every 6 hours PRN Moderate Pain (4 - 6)  dextrose Gel 1 Dose(s) Oral once PRN Blood Glucose LESS THAN 70 milliGRAM(s)/deciliter  glucagon  Injectable 1 milliGRAM(s) IntraMuscular once PRN Glucose LESS THAN 70 milligrams/deciliter      ALLERGIES:  Allergies    No Known Allergies    Intolerances        LABS:                        8.5    16.9  )-----------( 388      ( 17 Apr 2018 06:59 )             27.5     04-17    143  |  100  |  79<H>  ----------------------------<  185<H>  5.0   |  22  |  4.93<H>    Ca    9.4      17 Apr 2018 06:58  Phos  3.5     04-17  Mg     2.2     04-17    TPro  6.9  /  Alb  2.8<L>  /  TBili  0.6  /  DBili  x   /  AST  19  /  ALT  17  /  AlkPhos  148<H>  04-16    PT/INR - ( 16 Apr 2018 05:41 )   PT: 15.6 sec;   INR: 1.40          PTT - ( 17 Apr 2018 07:29 )  PTT:65.6 sec      RADIOLOGY & ADDITIONAL TESTS: Reviewed.

## 2018-04-17 NOTE — PROGRESS NOTE ADULT - SUBJECTIVE AND OBJECTIVE BOX
INTERVAL HPI/OVERNIGHT EVENTS:    O/N: Levophed requirements stable. Blood cultures ( x1) sent. T Max 100.7. WBC 16.6 from 18.4  SUBJECTIVE: Patient seen and examined at bedside. Unable to obtain ROS.      ANTIBIOTICS/RELEVANT:    MEDICATIONS  (STANDING):  albumin human 25% IVPB 50 milliLiter(s) IV Intermittent every 1 hour  aspirin  chewable 81 milliGRAM(s) Oral daily  atorvastatin 80 milliGRAM(s) Oral at bedtime  cefTRIAXone Injectable. 2000 milliGRAM(s) IV Push every 24 hours  chlorhexidine 0.12% Liquid 15 milliLiter(s) Swish and Spit two times a day  chlorhexidine 2% Cloths 1 Application(s) Topical daily  dextrose 5%. 1000 milliLiter(s) (50 mL/Hr) IV Continuous <Continuous>  dextrose 50% Injectable 12.5 Gram(s) IV Push once  dextrose 50% Injectable 25 Gram(s) IV Push once  dextrose 50% Injectable 25 Gram(s) IV Push once  ergocalciferol Drops 6000 Unit(s) Oral daily  escitalopram 5 milliGRAM(s) Oral daily  fluconAZOLE IVPB 200 milliGRAM(s) IV Intermittent every 24 hours  folic acid 1 milliGRAM(s) Oral daily  heparin  Infusion 1700 Unit(s)/Hr (17 mL/Hr) IV Continuous <Continuous>  hydrocortisone sodium succinate Injectable 25 milliGRAM(s) IV Push every 12 hours  insulin glargine Injectable (LANTUS) 10 Unit(s) SubCutaneous at bedtime  insulin regular  human corrective regimen sliding scale   SubCutaneous every 6 hours  midodrine 15 milliGRAM(s) Oral every 8 hours  modafinil 100 milliGRAM(s) Oral <User Schedule>  multivitamin 1 Tablet(s) Oral daily  norepinephrine Infusion 0.01 MICROgram(s)/kG/Min (1.5 mL/Hr) IV Continuous <Continuous>  pantoprazole   Suspension 40 milliGRAM(s) Oral daily  thiamine 100 milliGRAM(s) Oral daily    MEDICATIONS  (PRN):  acetaminophen    Suspension 650 milliGRAM(s) Oral every 6 hours PRN For Temp greater than 38 C (100.4 F)  acetaminophen    Suspension. 650 milliGRAM(s) Oral every 6 hours PRN Moderate Pain (4 - 6)  dextrose Gel 1 Dose(s) Oral once PRN Blood Glucose LESS THAN 70 milliGRAM(s)/deciliter  glucagon  Injectable 1 milliGRAM(s) IntraMuscular once PRN Glucose LESS THAN 70 milligrams/deciliter        Vital Signs Last 24 Hrs  T(C): 36.7 (17 Apr 2018 10:00), Max: 38.2 (16 Apr 2018 17:52)  T(F): 98 (17 Apr 2018 10:00), Max: 100.7 (16 Apr 2018 17:52)  HR: 89 (17 Apr 2018 12:44) (83 - 124)  BP: 90/71 (17 Apr 2018 10:01) (72/57 - 97/79)  BP(mean): 77 (17 Apr 2018 10:01) (62 - 90)  RR: 18 (17 Apr 2018 10:01) (9 - 47)  SpO2: 100% (17 Apr 2018 12:44) (99% - 100%)    04-16-18 @ 07:01  -  04-17-18 @ 07:00  --------------------------------------------------------  IN: 1726 mL / OUT: 250 mL / NET: 1476 mL    04-17-18 @ 07:01  -  04-17-18 @ 12:57  --------------------------------------------------------  IN: 224 mL / OUT: 0 mL / NET: 224 mL      PHYSICAL EXAM:  General: NAD  HEENT: NCAT, PERRL, clear conjunctiva, no scleral icterus  Neck: supple, no JVD; with trach in place  Respiratory: CTA b/l  Cardiovascular: RRR, normal S1S2, no M/R/G  Abdomen: soft, NT/ND  Extremities: WWP, no edema  Neuro: AOx0      LABS:                        8.5    16.9  )-----------( 388      ( 17 Apr 2018 06:59 )             27.5     04-17    143  |  100  |  79<H>  ----------------------------<  185<H>  5.0   |  22  |  4.93<H>    Ca    9.4      17 Apr 2018 06:58  Phos  3.5     04-17  Mg     2.2     04-17    TPro  6.9  /  Alb  2.8<L>  /  TBili  0.6  /  DBili  x   /  AST  19  /  ALT  17  /  AlkPhos  148<H>  04-16    PT/INR - ( 16 Apr 2018 05:41 )   PT: 15.6 sec;   INR: 1.40          PTT - ( 17 Apr 2018 07:29 )  PTT:65.6 sec      MICROBIOLOGY:    Culture - Blood (04.14.18 @ 21:18)    Specimen Source: .Blood Blood    Culture Results:   No growth at 2 days.    Culture - Blood (04.14.18 @ 21:18)    Specimen Source: .Blood Blood-Arterial    Culture Results:   No growth at 2 days.    Culture - Fungal, Blood (04.13.18 @ 16:55)    Specimen Source: .Blood Blood    Culture Results:   Testing in progress    Culture - Sputum . (04.13.18 @ 10:35)    -  Tobramycin: S <=4    -  Piperacillin/Tazobactam: S <=16    -  Ciprofloxacin: S <=1    -  Gentamicin: S <=4    -  Ceftriaxone: S <=1 Enterobacter, Citrobacter, and Serratia may develop resistance during prolonged therapy    -  Cefazolin: S <=8    -  Ampicillin/Sulbactam: S <=8/4    -  Ampicillin: R >16 These ampicillin results predict results for amoxicillin    Gram Stain:   Rare epithelial cells  Numerous White blood cells  Numerous Gram Positive Rods  Few Gram Positive Cocci in Pairs and Chains    -  Trimethoprim/Sulfamethoxazole: S <=2/38    Specimen Source: .Sputum Sputum    Culture Results:   Numerous Klebsiella pneumoniae  Accompanied by normal respiratory blu    Organism Identification: Klebsiella pneumoniae    Organism: Klebsiella pneumoniae    Method Type: YONATHAN      RADIOLOGY & ADDITIONAL STUDIES: Reviewed

## 2018-04-17 NOTE — PROGRESS NOTE ADULT - ATTENDING COMMENTS
In addition to above, fever curve improving and he appears to be tolerating HD with minimal pressor requirements. Reasonable to retain the R IJ Palindrome HD catheter at this time. In addition to above, fever curve improving and he appears to be tolerating HD with minimal pressor requirements. Surveillance bcx from 4/14 ngtd. Reasonable to retain the R IJ Palindrome HD catheter at this time.

## 2018-04-17 NOTE — PROGRESS NOTE ADULT - PROBLEM SELECTOR PLAN 1
Patient is a 63  year old male with acute on chronic renal failure from ischemic ATN. Patient is currently requiring hemodialysis. Patient was last dialyzed 4/14 with UF of 500 cc.     P - Dialysis today  Revaclear 300, , , 2K bath   Goal  cc- 1kg over 3 hours

## 2018-04-17 NOTE — PROGRESS NOTE ADULT - SUBJECTIVE AND OBJECTIVE BOX
Patient seen and examined at bedside. Patient is a 63 year old male with a history of HFrEF 10-15% due to ischemic cardiomyopathy, s/p AICD, ?atrial fibrillation, hypertension, diabetes mellitus type 2, gout, and CKD for whom nephrology was called for GILMER from ATN requiring hemodialysis. Patient appears clinically unchanged. His vasopressor requirement is coming down. Patient was last dialyzed 4/14 with UF of 500cc.      acetaminophen    Suspension 650 milliGRAM(s) every 6 hours PRN  acetaminophen    Suspension. 650 milliGRAM(s) every 6 hours PRN  albumin human 25% IVPB 50 milliLiter(s) every 1 hour  aspirin  chewable 81 milliGRAM(s) daily  cefTRIAXone Injectable. 2000 milliGRAM(s) every 24 hours  chlorhexidine 0.12% Liquid 15 milliLiter(s) two times a day  chlorhexidine 2% Cloths 1 Application(s) daily  dextrose 5%. 1000 milliLiter(s) <Continuous>  dextrose 50% Injectable 12.5 Gram(s) once  dextrose 50% Injectable 25 Gram(s) once  dextrose 50% Injectable 25 Gram(s) once  dextrose Gel 1 Dose(s) once PRN  ergocalciferol Drops 6000 Unit(s) daily  escitalopram 5 milliGRAM(s) daily  fluconAZOLE IVPB 200 milliGRAM(s) every 24 hours  folic acid 1 milliGRAM(s) daily  glucagon  Injectable 1 milliGRAM(s) once PRN  heparin  Infusion 1700 Unit(s)/Hr <Continuous>  insulin glargine Injectable (LANTUS) 10 Unit(s) at bedtime  insulin regular  human corrective regimen sliding scale   every 6 hours  midodrine 15 milliGRAM(s) every 8 hours  modafinil 100 milliGRAM(s) <User Schedule>  multivitamin 1 Tablet(s) daily  norepinephrine Infusion 0.01 MICROgram(s)/kG/Min <Continuous>  pantoprazole   Suspension 40 milliGRAM(s) daily  thiamine 100 milliGRAM(s) daily    Allergies    No Known Allergies    Intolerances    T(C): , Max: 38.2 (04-16-18 @ 17:52)  T(F): , Max: 100.7 (04-16-18 @ 17:52)  HR: 104 (04-17-18 @ 09:00)  BP: 92/75 (04-17-18 @ 09:00)  BP(mean): 80 (04-17-18 @ 09:00)  RR: 29 (04-17-18 @ 09:00)  SpO2: 100% (04-17-18 @ 09:00)  Wt(kg): --    04-16 @ 07:01  -  04-17 @ 07:00  --------------------------------------------------------  IN:    heparin Infusion: 84 mL    heparin Infusion: 96 mL    heparin Infusion: 187 mL    norepinephrine Infusion: 140 mL    Vital HN: 1219 mL  Total IN: 1726 mL    OUT:    Rectal Tube: 250 mL  Total OUT: 250 mL    Total NET: 1476 mL      04-17 @ 07:01  -  04-17 @ 10:17  --------------------------------------------------------  IN:    heparin Infusion: 17 mL    norepinephrine Infusion: 6 mL    Vital HN: 53 mL  Total IN: 76 mL    OUT:  Total OUT: 0 mL    Total NET: 76 mL    LABS:                        8.5    16.9  )-----------( 388      ( 17 Apr 2018 06:59 )             27.5     04-17    143  |  100  |  79<H>  ----------------------------<  185<H>  5.0   |  22  |  4.93<H>    Ca    9.4      17 Apr 2018 06:58  Phos  3.5     04-17  Mg     2.2     04-17    TPro  6.9  /  Alb  2.8<L>  /  TBili  0.6  /  DBili  x   /  AST  19  /  ALT  17  /  AlkPhos  148<H>  04-16    PT/INR - ( 16 Apr 2018 05:41 )   PT: 15.6 sec;   INR: 1.40        PTT - ( 17 Apr 2018 07:29 )  PTT:65.6 sec

## 2018-04-17 NOTE — PROGRESS NOTE ADULT - SUBJECTIVE AND OBJECTIVE BOX
Patient was seen and evaluated on dialysis.   Patient is tolerating the procedure well.   HR: 88 (04-17-18 @ 13:00)  BP: 80/64 (04-17-18 @ 13:00)  Continue dialysis:   Revaclear 300, , , 2K bath   Goal  cc- 1kg over 3 hours.

## 2018-04-17 NOTE — PROGRESS NOTE ADULT - ASSESSMENT
63M PMH HFrEF 10-15% (ischemic), MI, s/p AICD vs PPM, possible Afib, HTN, DM2 on insulin, possible CKD, and gout, who presented with a chief complaint of generalized weakness s/p septic shock likely 2/2 LE cellulitis complicated by ATN requiring dialysis. Now with AMS likely from brainstem infarct and/or toxic metabolic encephalopathy, now with candidemia (resolving) and sepsis 2/2 klebsiella bacteremia.    NEURO  #Toxic Metabolic Encephalopathy- Patient acutely unresponsive since 3/22 and has had waxing/ waning mental status since. CT, CTA negative. VEEG showed moderate slowing but no seizure activity. Decline in mental status likely 2/2 to underlying infection vs acute neurologic event. 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 Per neurology may have had ischemic event from hypoperfusion. Also showing disc protrusion at C3/C4 level coursing superiorly to the C2/C3 contacting the ventral spinal cord. Per neurology, this may be contributing to weakness, however would not explain change in mental status.  S/p PEG/trach on 4/4. Repeat CT head 4/11 performed due to concern for not moving LUE, however only showing only chronic infarctions.  - C/w Modafinil  - Unclear neuro prognosis at this point, patient waxing and waning    ID    #Klebsiella Bacteremia- Urine, blood and sputum cx growing klebsiella.  Sensitive to ceftriaxone.  -C/w Ceftriaxone 2g q 24 hrs (4/15--)  -S/p Zosyn 4/14-4/15  -S/p Meropenem 4/13  - surveillance blood cultures NGTD   - f/u ID recs  - Low threshold to remove HD permacath if patient becomes septic    #Candidemia with candida tropicalis- Blood cultures positive for candida tropicalis on 4/7 and again on 4/9. No source identified. CTAP 4/11 negative for abscess.  - c/w Fluconazole 200mg q 24hrs (4/13--), will need to continue for 2 weeks from first negative fungal culture (4/10), last day 4/24  - S/p Micafungin 100mg q 24hrs (4/7-4/12)  - Surveillance fungal cultures 4/10 NGTD  - RUKHSANA negative for vegetations/ infection of AICD  - Tunelled HD catheter removed 4/8, temp HD placed 4/10, permanent HD cath placed 4/12  - ID following, recs appreciated    #UTI- urine culture from 4/11 growing klebsiella sensitive to ceftriaxone, and now bacteremia with GNR likely 2/2 klebsiella (in urine and lung).     CARDIOVASCULAR   # HYPOTENSION-   - c/w Midodrine 15 mg q8h to maintain SBP>65  - Restart hydrocortisone 25mg BID  - Levophed PRN to maintain MAP >65  - CHF with severely reduced EF 15%, caution with fluids.     # CHF exacerbation- CHF with EF 10-15% per pt 2/2 ischemic cardiomyopathy s/p AICD. Elevated BNP on admission with R sided effusion and edema. Patient with persistent pleural effusions on CXR and US and b/l dependent edema.   - Maintain negative fluid balance with dialysis,  - Unclear medical regimen opt. Per [facility] pharmacy ( and [place]) pt has not picked up Torsemide 20 or Metoprolol 100 since November.   - Hold ACE/Metoprolol in setting of sepsis/ hypotension    #AFIB- hx of Afib and LV thrombus on coumadin prior to admission.   - Heparin gtt  - Holding Metoprolol 12.5 q12h given hypotension. Will use Dig for rate control if RVR  - TTE with definity shows no  LV thrombus currently    #CAD- Hx of MI and ischemic CM s/p AICD, STEMI 7/2017, was started on Aspirin and Brilinta per records from [place]  -aspirin 81  -Atorvastatin 80mg   -Continue to hold coumadin as patient acutely ill and may need additional procedures    #LE Edema   - LE edema with chronic venous stasis.   - Duplex does not show DVT    PULMONARY   #Acute Resp failure- S/p tracheostomy 4/5. Bedside US with simple b/l pleural effusions and atelectatic lung. Less concern for infectious source given b/l effusions with no septations/loculations. Likely related to fluid overload. If clinically worsening, can consider thoracentesis for fluid studies and cultures.  - c/w daily CPAP trials, weaning to trach collar as tolerated  - c/w HD to remove excess fluid      RENAL   #ARF- likely ischemic ATN in setting of sepsis with low intravascular volume. Unknown baseline Cr presenting with Cr 3.93 with metabolic derangements. Renal team on board, now on intermittent HD. HD catheter removed 4/8 due to fungemia, temporary HD catheter placed 4/10.  Now s/p permacath replacement 4/12.   - Last dialyzed 4/14  - c/w intermittent HD per renal recs   - retroperitoneal ultrasound showing medical renal disease.     #Elevated AG- Fluctuating at low level; lactate negative, glucose has been well controlled, possibly 2/2 uremia in setting of ESRD.   - Monitor BMP    #Hyperkalemia- PT with intermittently elevated K,  no EKG changes.  - Continue telemetry monitoring  - Trend K   - c/w dialysis   - Kayexalate PRN      GI   # transaminitis and elevated bili- Patient with transaminitis and elevated bili, that has now resolved. Prior RUQ US with cholelithiasis and prior CT abdomen/pelvis consistent with cholelithiasis and ascites.   - Repeat RUQ US negative fo cholecystitis rec HIDA if strong clinical suspicion.   -Bili/ transaminitis downtrended, but still with elevated alk phos.     HEME  #Thrombocytopenia- ELVIRA negative but PF4 was positive. Unlikely HIT. Most likely 2/2 zosyn or sepsis. Now resolved.  -Restart heparin gtt  -Continue to hold coumadin until clear he has no further procedures       #Elevated INR. Unclear etiology.   - Given Vit K and FFP3/12. FFP 3/13, FFP 4/4  - Monitor coags    #Anemia- Likely 2/2 phlebotomy and CKD, no signs of bleeding. Prior studies showed Anemia of chronic disease.  -Continue to monitor  -s/p 1pRBC on 3/12, 3/16 and 4/4    ENDOCRINE   - Lantus 10U  - MISS  - monitor FSG and adjust as needed   - A1C 8.6%      F: No IVF   E: Replete K<4 Mg<2, caution in setting of acute renal failure  N: PEG Placed 4/4, feeds changed to Vital 1.5 given diarrhea.   DVT ppx: on heparin drip  GI ppx: PPI 40 daily  Lines: R dialysis permacath (4/12)  Drips: Levophed  Full code  Dispo: MICU 63M PMH HFrEF 10-15% (ischemic), MI, s/p AICD vs PPM, possible Afib, HTN, DM2 on insulin, possible CKD, and gout, who presented with a chief complaint of generalized weakness s/p septic shock likely 2/2 LE cellulitis complicated by ATN requiring dialysis. Now with AMS likely from brainstem infarct and/or toxic metabolic encephalopathy, now with candidemia (resolving) and sepsis 2/2 klebsiella bacteremia. He was intiially treated with meropenem, then switched to zosyn, then finally to ceftriaxone when sensitivities were available. Hydrocortisone was discontinued due to infection. Patient has continued to have low grade temperatures and intermittently required levophed, hydrocortisone restarted on 4/17. Low threshold to remove HD catheter if patient decompensates. Patient also found to have small bowel edema and diarrhea, feeds switched to Vital and metamucil added, C diff ruled out.     NEURO  #Toxic Metabolic Encephalopathy- Patient acutely unresponsive since 3/22 and has had waxing/ waning mental status since. CT, CTA negative. VEEG showed moderate slowing but no seizure activity. Decline in mental status likely 2/2 to underlying infection vs acute neurologic event. 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 Per neurology may have had ischemic event from hypoperfusion. Also showing disc protrusion at C3/C4 level coursing superiorly to the C2/C3 contacting the ventral spinal cord. Per neurology, this may be contributing to weakness, however would not explain change in mental status.  S/p PEG/trach on 4/4. Repeat CT head 4/11 performed due to concern for not moving LUE, however only showing only chronic infarctions.  - C/w Modafinil  - Unclear neuro prognosis at this point, patient waxing and waning    ID    #Klebsiella Bacteremia- Urine, blood and sputum cx growing klebsiella.  Sensitive to ceftriaxone.  -C/w Ceftriaxone 2g q 24 hrs (4/15--)  -S/p Zosyn 4/14-4/15  -S/p Meropenem 4/13  - surveillance blood cultures NGTD   - f/u ID recs  - Low threshold to remove HD permacath if patient becomes septic    #Candidemia with candida tropicalis- Blood cultures positive for candida tropicalis on 4/7 and again on 4/9. No source identified. CTAP 4/11 negative for abscess.  - c/w Fluconazole 200mg q 24hrs (4/13--), will need to continue for 2 weeks from first negative fungal culture (4/10), last day 4/24  - S/p Micafungin 100mg q 24hrs (4/7-4/12)  - Surveillance fungal cultures 4/10 NGTD  - RUKHSANA negative for vegetations/ infection of AICD  - Tunelled HD catheter removed 4/8, temp HD placed 4/10, permanent HD cath placed 4/12  - ID following, recs appreciated    #UTI- urine culture from 4/11 growing klebsiella sensitive to ceftriaxone, and now bacteremia with GNR likely 2/2 klebsiella (in urine and lung).     CARDIOVASCULAR   # HYPOTENSION-   - c/w Midodrine 15 mg q8h to maintain SBP>65  - Restart hydrocortisone 25mg BID  - Levophed PRN to maintain MAP >65  - CHF with severely reduced EF 15%, caution with fluids.     # CHF exacerbation- CHF with EF 10-15% per pt 2/2 ischemic cardiomyopathy s/p AICD. Elevated BNP on admission with R sided effusion and edema. Patient with persistent pleural effusions on CXR and US and b/l dependent edema.   - Maintain negative fluid balance with dialysis,  - Unclear medical regimen opt. Per [facility] pharmacy ( and [place]) pt has not picked up Torsemide 20 or Metoprolol 100 since November.   - Hold ACE/Metoprolol in setting of sepsis/ hypotension    #AFIB- hx of Afib and LV thrombus on coumadin prior to admission.   - Heparin gtt  - Holding Metoprolol 12.5 q12h given hypotension. Will use Dig for rate control if RVR  - TTE with definity shows no  LV thrombus currently    #CAD- Hx of MI and ischemic CM s/p AICD, STEMI 7/2017, was started on Aspirin and Brilinta per records from [place]  -aspirin 81  -Atorvastatin 80mg   -Continue to hold coumadin as patient acutely ill and may need additional procedures    #LE Edema   - LE edema with chronic venous stasis.   - Duplex does not show DVT    PULMONARY   #Acute Resp failure- S/p tracheostomy 4/5. Bedside US with simple b/l pleural effusions and atelectatic lung. Less concern for infectious source given b/l effusions with no septations/loculations. Likely related to fluid overload. If clinically worsening, can consider thoracentesis for fluid studies and cultures.  - c/w daily CPAP trials, weaning to trach collar as tolerated  - c/w HD to remove excess fluid      RENAL   #ARF- likely ischemic ATN in setting of sepsis with low intravascular volume. Unknown baseline Cr presenting with Cr 3.93 with metabolic derangements. Renal team on board, now on intermittent HD. HD catheter removed 4/8 due to fungemia, temporary HD catheter placed 4/10.  Now s/p permacath replacement 4/12.   - Last dialyzed 4/14  - c/w intermittent HD per renal recs   - retroperitoneal ultrasound showing medical renal disease.     #Elevated AG- Fluctuating at low level; lactate negative, glucose has been well controlled, possibly 2/2 uremia in setting of ESRD.   - Monitor BMP    #Hyperkalemia- PT with intermittently elevated K,  no EKG changes.  - Continue telemetry monitoring  - Trend K   - c/w dialysis   - Kayexalate PRN      GI   # transaminitis and elevated bili- Patient with transaminitis and elevated bili, that has now resolved. Prior RUQ US with cholelithiasis and prior CT abdomen/pelvis consistent with cholelithiasis and ascites.   - Repeat RUQ US negative fo cholecystitis rec HIDA if strong clinical suspicion.   -Bili/ transaminitis downtrended, but still with elevated alk phos.     HEME  #Thrombocytopenia- ELVIRA negative but PF4 was positive. Unlikely HIT. Most likely 2/2 zosyn or sepsis. Now resolved.  -Restart heparin gtt  -Continue to hold coumadin until clear he has no further procedures       #Elevated INR. Unclear etiology.   - Given Vit K and FFP3/12. FFP 3/13, FFP 4/4  - Monitor coags    #Anemia- Likely 2/2 phlebotomy and CKD, no signs of bleeding. Prior studies showed Anemia of chronic disease.  -Continue to monitor  -s/p 1pRBC on 3/12, 3/16 and 4/4    ENDOCRINE   - Lantus 10U  - MISS  - monitor FSG and adjust as needed   - A1C 8.6%      F: No IVF   E: Replete K<4 Mg<2, caution in setting of acute renal failure  N: PEG Placed 4/4, feeds changed to Vital 1.5 given diarrhea.   DVT ppx: on heparin drip  GI ppx: PPI 40 daily  Lines: R dialysis permacath (4/12)  Drips: Levophed  Full code  Dispo: MICU

## 2018-04-17 NOTE — PROGRESS NOTE ADULT - ASSESSMENT
He does respond by shaking head yes or no, and tries to follow commands with his right hand. 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 While it is not urgent, I would recommend getting a CT of the cervical spine without contrast to evaluate the disc herniation seen on MRI of the brain, as this could be contributing to weakness of the limbs (although could not explain the whole clinical picture). I continue to believe that he sustained a hypoxic injury to the brain / brainstem that was not fully demonstrated on MRI.

## 2018-04-18 LAB
ANION GAP SERPL CALC-SCNC: 18 MMOL/L — HIGH (ref 5–17)
APTT BLD: 41.9 SEC — HIGH (ref 27.5–37.4)
APTT BLD: 85.7 SEC — HIGH (ref 27.5–37.4)
APTT BLD: 89.2 SEC — HIGH (ref 27.5–37.4)
BASOPHILS NFR BLD AUTO: 0.1 % — SIGNIFICANT CHANGE UP (ref 0–2)
BUN SERPL-MCNC: 53 MG/DL — HIGH (ref 7–23)
CALCIUM SERPL-MCNC: 9 MG/DL — SIGNIFICANT CHANGE UP (ref 8.4–10.5)
CHLORIDE SERPL-SCNC: 97 MMOL/L — SIGNIFICANT CHANGE UP (ref 96–108)
CO2 SERPL-SCNC: 23 MMOL/L — SIGNIFICANT CHANGE UP (ref 22–31)
CREAT SERPL-MCNC: 3.47 MG/DL — HIGH (ref 0.5–1.3)
EOSINOPHIL NFR BLD AUTO: 0 % — SIGNIFICANT CHANGE UP (ref 0–6)
GLUCOSE BLDC GLUCOMTR-MCNC: 125 MG/DL — HIGH (ref 70–99)
GLUCOSE BLDC GLUCOMTR-MCNC: 128 MG/DL — HIGH (ref 70–99)
GLUCOSE BLDC GLUCOMTR-MCNC: 138 MG/DL — HIGH (ref 70–99)
GLUCOSE BLDC GLUCOMTR-MCNC: 140 MG/DL — HIGH (ref 70–99)
GLUCOSE BLDC GLUCOMTR-MCNC: 141 MG/DL — HIGH (ref 70–99)
GLUCOSE BLDC GLUCOMTR-MCNC: 142 MG/DL — HIGH (ref 70–99)
GLUCOSE BLDC GLUCOMTR-MCNC: 171 MG/DL — HIGH (ref 70–99)
GLUCOSE BLDC GLUCOMTR-MCNC: 184 MG/DL — HIGH (ref 70–99)
GLUCOSE BLDC GLUCOMTR-MCNC: 209 MG/DL — HIGH (ref 70–99)
GLUCOSE BLDC GLUCOMTR-MCNC: 218 MG/DL — HIGH (ref 70–99)
GLUCOSE BLDC GLUCOMTR-MCNC: 257 MG/DL — HIGH (ref 70–99)
GLUCOSE BLDC GLUCOMTR-MCNC: 325 MG/DL — HIGH (ref 70–99)
GLUCOSE BLDC GLUCOMTR-MCNC: 329 MG/DL — HIGH (ref 70–99)
GLUCOSE BLDC GLUCOMTR-MCNC: 337 MG/DL — HIGH (ref 70–99)
GLUCOSE BLDC GLUCOMTR-MCNC: 358 MG/DL — HIGH (ref 70–99)
GLUCOSE BLDC GLUCOMTR-MCNC: 96 MG/DL — SIGNIFICANT CHANGE UP (ref 70–99)
GLUCOSE SERPL-MCNC: 364 MG/DL — HIGH (ref 70–99)
HCT VFR BLD CALC: 25.1 % — LOW (ref 39–50)
HGB BLD-MCNC: 7.5 G/DL — LOW (ref 13–17)
LYMPHOCYTES # BLD AUTO: 3.3 % — LOW (ref 13–44)
MAGNESIUM SERPL-MCNC: 2 MG/DL — SIGNIFICANT CHANGE UP (ref 1.6–2.6)
MCHC RBC-ENTMCNC: 28.8 PG — SIGNIFICANT CHANGE UP (ref 27–34)
MCHC RBC-ENTMCNC: 29.9 G/DL — LOW (ref 32–36)
MCV RBC AUTO: 96.5 FL — SIGNIFICANT CHANGE UP (ref 80–100)
MONOCYTES NFR BLD AUTO: 2.7 % — SIGNIFICANT CHANGE UP (ref 2–14)
NEUTROPHILS NFR BLD AUTO: 93.9 % — HIGH (ref 43–77)
PHOSPHATE SERPL-MCNC: 3.5 MG/DL — SIGNIFICANT CHANGE UP (ref 2.5–4.5)
PLATELET # BLD AUTO: 386 K/UL — SIGNIFICANT CHANGE UP (ref 150–400)
POTASSIUM SERPL-MCNC: 4.4 MMOL/L — SIGNIFICANT CHANGE UP (ref 3.5–5.3)
POTASSIUM SERPL-SCNC: 4.4 MMOL/L — SIGNIFICANT CHANGE UP (ref 3.5–5.3)
RBC # BLD: 2.6 M/UL — LOW (ref 4.2–5.8)
RBC # FLD: 18.8 % — HIGH (ref 10.3–16.9)
SODIUM SERPL-SCNC: 138 MMOL/L — SIGNIFICANT CHANGE UP (ref 135–145)
WBC # BLD: 16 K/UL — HIGH (ref 3.8–10.5)
WBC # FLD AUTO: 16 K/UL — HIGH (ref 3.8–10.5)

## 2018-04-18 PROCEDURE — 99233 SBSQ HOSP IP/OBS HIGH 50: CPT | Mod: GC

## 2018-04-18 RX ORDER — INSULIN HUMAN 100 [IU]/ML
4 INJECTION, SOLUTION SUBCUTANEOUS
Qty: 100 | Refills: 0 | Status: DISCONTINUED | OUTPATIENT
Start: 2018-04-18 | End: 2018-04-18

## 2018-04-18 RX ORDER — HEPARIN SODIUM 5000 [USP'U]/ML
1600 INJECTION INTRAVENOUS; SUBCUTANEOUS
Qty: 25000 | Refills: 0 | Status: DISCONTINUED | OUTPATIENT
Start: 2018-04-18 | End: 2018-04-18

## 2018-04-18 RX ORDER — INSULIN HUMAN 100 [IU]/ML
INJECTION, SOLUTION SUBCUTANEOUS EVERY 6 HOURS
Qty: 0 | Refills: 0 | Status: DISCONTINUED | OUTPATIENT
Start: 2018-04-18 | End: 2018-04-20

## 2018-04-18 RX ORDER — INSULIN HUMAN 100 [IU]/ML
5 INJECTION, SOLUTION SUBCUTANEOUS EVERY 6 HOURS
Qty: 0 | Refills: 0 | Status: DISCONTINUED | OUTPATIENT
Start: 2018-04-18 | End: 2018-04-19

## 2018-04-18 RX ORDER — HEPARIN SODIUM 5000 [USP'U]/ML
1700 INJECTION INTRAVENOUS; SUBCUTANEOUS
Qty: 25000 | Refills: 0 | Status: DISCONTINUED | OUTPATIENT
Start: 2018-04-18 | End: 2018-04-19

## 2018-04-18 RX ORDER — FLUCONAZOLE 150 MG/1
200 TABLET ORAL EVERY 24 HOURS
Qty: 0 | Refills: 0 | Status: COMPLETED | OUTPATIENT
Start: 2018-04-18 | End: 2018-04-24

## 2018-04-18 RX ORDER — HEPARIN SODIUM 5000 [USP'U]/ML
1800 INJECTION INTRAVENOUS; SUBCUTANEOUS
Qty: 25000 | Refills: 0 | Status: DISCONTINUED | OUTPATIENT
Start: 2018-04-18 | End: 2018-04-18

## 2018-04-18 RX ORDER — CEFTRIAXONE 500 MG/1
2000 INJECTION, POWDER, FOR SOLUTION INTRAMUSCULAR; INTRAVENOUS EVERY 24 HOURS
Qty: 0 | Refills: 0 | Status: COMPLETED | OUTPATIENT
Start: 2018-04-18 | End: 2018-04-26

## 2018-04-18 RX ORDER — INSULIN GLARGINE 100 [IU]/ML
30 INJECTION, SOLUTION SUBCUTANEOUS AT BEDTIME
Qty: 0 | Refills: 0 | Status: DISCONTINUED | OUTPATIENT
Start: 2018-04-18 | End: 2018-04-19

## 2018-04-18 RX ORDER — DEXTROSE 50 % IN WATER 50 %
25 SYRINGE (ML) INTRAVENOUS ONCE
Qty: 0 | Refills: 0 | Status: COMPLETED | OUTPATIENT
Start: 2018-04-18 | End: 2018-04-18

## 2018-04-18 RX ORDER — HUMAN INSULIN 100 [IU]/ML
10 INJECTION, SUSPENSION SUBCUTANEOUS ONCE
Qty: 0 | Refills: 0 | Status: COMPLETED | OUTPATIENT
Start: 2018-04-18 | End: 2018-04-18

## 2018-04-18 RX ORDER — INSULIN HUMAN 100 [IU]/ML
8 INJECTION, SOLUTION SUBCUTANEOUS
Qty: 100 | Refills: 0 | Status: DISCONTINUED | OUTPATIENT
Start: 2018-04-18 | End: 2018-04-18

## 2018-04-18 RX ADMIN — FLUCONAZOLE 100 MILLIGRAM(S): 150 TABLET ORAL at 22:04

## 2018-04-18 RX ADMIN — Medication 25 MILLIGRAM(S): at 22:06

## 2018-04-18 RX ADMIN — Medication 25 MILLILITER(S): at 12:44

## 2018-04-18 RX ADMIN — CHLORHEXIDINE GLUCONATE 15 MILLILITER(S): 213 SOLUTION TOPICAL at 22:05

## 2018-04-18 RX ADMIN — Medication 100 MILLIGRAM(S): at 11:32

## 2018-04-18 RX ADMIN — Medication 1 TABLET(S): at 11:32

## 2018-04-18 RX ADMIN — ATORVASTATIN CALCIUM 80 MILLIGRAM(S): 80 TABLET, FILM COATED ORAL at 22:05

## 2018-04-18 RX ADMIN — MODAFINIL 100 MILLIGRAM(S): 200 TABLET ORAL at 12:43

## 2018-04-18 RX ADMIN — INSULIN HUMAN 5 UNIT(S): 100 INJECTION, SOLUTION SUBCUTANEOUS at 23:34

## 2018-04-18 RX ADMIN — MODAFINIL 100 MILLIGRAM(S): 200 TABLET ORAL at 07:33

## 2018-04-18 RX ADMIN — INSULIN HUMAN 4: 100 INJECTION, SOLUTION SUBCUTANEOUS at 23:34

## 2018-04-18 RX ADMIN — ESCITALOPRAM OXALATE 5 MILLIGRAM(S): 10 TABLET, FILM COATED ORAL at 11:32

## 2018-04-18 RX ADMIN — CHLORHEXIDINE GLUCONATE 15 MILLILITER(S): 213 SOLUTION TOPICAL at 11:32

## 2018-04-18 RX ADMIN — Medication 1 PACKET(S): at 11:32

## 2018-04-18 RX ADMIN — MIDODRINE HYDROCHLORIDE 15 MILLIGRAM(S): 2.5 TABLET ORAL at 04:23

## 2018-04-18 RX ADMIN — HEPARIN SODIUM 17 UNIT(S)/HR: 5000 INJECTION INTRAVENOUS; SUBCUTANEOUS at 22:05

## 2018-04-18 RX ADMIN — Medication 81 MILLIGRAM(S): at 11:32

## 2018-04-18 RX ADMIN — PANTOPRAZOLE SODIUM 40 MILLIGRAM(S): 20 TABLET, DELAYED RELEASE ORAL at 11:32

## 2018-04-18 RX ADMIN — Medication 25 MILLIGRAM(S): at 11:33

## 2018-04-18 RX ADMIN — INSULIN HUMAN 2: 100 INJECTION, SOLUTION SUBCUTANEOUS at 18:04

## 2018-04-18 RX ADMIN — CEFTRIAXONE 2000 MILLIGRAM(S): 500 INJECTION, POWDER, FOR SOLUTION INTRAMUSCULAR; INTRAVENOUS at 18:04

## 2018-04-18 RX ADMIN — CHLORHEXIDINE GLUCONATE 1 APPLICATION(S): 213 SOLUTION TOPICAL at 07:34

## 2018-04-18 RX ADMIN — Medication 1 MILLIGRAM(S): at 11:32

## 2018-04-18 RX ADMIN — INSULIN GLARGINE 30 UNIT(S): 100 INJECTION, SOLUTION SUBCUTANEOUS at 23:34

## 2018-04-18 RX ADMIN — INSULIN HUMAN 5 UNIT(S): 100 INJECTION, SOLUTION SUBCUTANEOUS at 18:05

## 2018-04-18 RX ADMIN — HUMAN INSULIN 10 UNIT(S): 100 INJECTION, SUSPENSION SUBCUTANEOUS at 13:39

## 2018-04-18 RX ADMIN — ERGOCALCIFEROL 6000 UNIT(S): 1.25 CAPSULE ORAL at 11:32

## 2018-04-18 NOTE — PROGRESS NOTE ADULT - ASSESSMENT
63M PMH HFrEF 10-15% (ischemic), MI, s/p AICD vs PPM, possible Afib, HTN, DM2 on insulin, possible CKD, and gout, who presented with a chief complaint of generalized weakness s/p septic shock likely 2/2 LE cellulitis complicated by ATN requiring dialysis. Now with AMS likely from brainstem infarct and/or toxic metabolic encephalopathy, now with candidemia (resolving) and sepsis 2/2 klebsiella bacteremia. He was intiially treated with meropenem, then switched to zosyn, then finally to ceftriaxone when sensitivities were available. Hydrocortisone was discontinued due to infection. Patient has continued to have low grade temperatures and intermittently required levophed, hydrocortisone restarted on 4/17. Low threshold to remove HD catheter if patient decompensates. Patient also found to have small bowel edema and diarrhea, feeds switched to Vital and metamucil added, C diff ruled out.     NEURO  #Toxic Metabolic Encephalopathy- Patient acutely unresponsive since 3/22 and has had waxing/ waning mental status since. CT, CTA negative. VEEG showed moderate slowing but no seizure activity. Decline in mental status likely 2/2 to underlying infection vs acute neurologic event. 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 Per neurology may have had ischemic event from hypoperfusion. Also showing disc protrusion at C3/C4 level coursing superiorly to the C2/C3 contacting the ventral spinal cord. Per neurology, this may be contributing to weakness, however would not explain change in mental status.  S/p PEG/trach on 4/4. Repeat CT head 4/11 performed due to concern for not moving LUE, however only showing only chronic infarctions.  - C/w Modafinil  - Unclear neuro prognosis at this point, patient waxing and waning    ID    #Klebsiella Bacteremia- Urine, blood and sputum cx growing klebsiella.  Sensitive to ceftriaxone.  -C/w Ceftriaxone 2g q 24 hrs (4/15--)  -S/p Zosyn 4/14-4/15  -S/p Meropenem 4/13  - surveillance blood cultures NGTD   - f/u ID recs  - Low threshold to remove HD permacath if patient becomes septic    #Candidemia with candida tropicalis- Blood cultures positive for candida tropicalis on 4/7 and again on 4/9. No source identified. CTAP 4/11 negative for abscess.  - c/w Fluconazole 200mg q 24hrs (4/13--), will need to continue for 2 weeks from first negative fungal culture (4/10), last day 4/24  - S/p Micafungin 100mg q 24hrs (4/7-4/12)  - Surveillance fungal cultures 4/10 NGTD  - RUKHSANA negative for vegetations/ infection of AICD  - Tunelled HD catheter removed 4/8, temp HD placed 4/10, permanent HD cath placed 4/12  - ID following, recs appreciated    #UTI- urine culture from 4/11 growing klebsiella sensitive to ceftriaxone, and now bacteremia with GNR likely 2/2 klebsiella (in urine and lung).     CARDIOVASCULAR   # HYPOTENSION-   - c/w Midodrine 15 mg q8h to maintain SBP>65  - Restart hydrocortisone 25mg BID  - Levophed PRN to maintain MAP >65  - CHF with severely reduced EF 15%, caution with fluids.     # CHF exacerbation- CHF with EF 10-15% per pt 2/2 ischemic cardiomyopathy s/p AICD. Elevated BNP on admission with R sided effusion and edema. Patient with persistent pleural effusions on CXR and US and b/l dependent edema.   - Maintain negative fluid balance with dialysis,  - Unclear medical regimen opt. Per [facility] pharmacy ( and [place]) pt has not picked up Torsemide 20 or Metoprolol 100 since November.   - Hold ACE/Metoprolol in setting of sepsis/ hypotension    #AFIB- hx of Afib and LV thrombus on coumadin prior to admission.   - Heparin gtt  - Holding Metoprolol 12.5 q12h given hypotension. Will use Dig for rate control if RVR  - TTE with definity shows no  LV thrombus currently    #CAD- Hx of MI and ischemic CM s/p AICD, STEMI 7/2017, was started on Aspirin and Brilinta per records from [place]  -aspirin 81  -Atorvastatin 80mg   -Continue to hold coumadin as patient acutely ill and may need additional procedures    #LE Edema   - LE edema with chronic venous stasis.   - Duplex does not show DVT    PULMONARY   #Acute Resp failure- S/p tracheostomy 4/5. Bedside US with simple b/l pleural effusions and atelectatic lung. Less concern for infectious source given b/l effusions with no septations/loculations. Likely related to fluid overload. If clinically worsening, can consider thoracentesis for fluid studies and cultures.  - c/w daily CPAP trials, weaning to trach collar as tolerated  - c/w HD to remove excess fluid      RENAL   #ARF- likely ischemic ATN in setting of sepsis with low intravascular volume. Unknown baseline Cr presenting with Cr 3.93 with metabolic derangements. Renal team on board, now on intermittent HD. HD catheter removed 4/8 due to fungemia, temporary HD catheter placed 4/10.  Now s/p permacath replacement 4/12.   - Last dialyzed 4/14  - c/w intermittent HD per renal recs   - retroperitoneal ultrasound showing medical renal disease.     #Elevated AG- Fluctuating at low level; lactate negative, glucose has been well controlled, possibly 2/2 uremia in setting of ESRD.   - Monitor BMP    #Hyperkalemia- PT with intermittently elevated K,  no EKG changes.  - Continue telemetry monitoring  - Trend K   - c/w dialysis   - Kayexalate PRN      GI   # transaminitis and elevated bili- Patient with transaminitis and elevated bili, that has now resolved. Prior RUQ US with cholelithiasis and prior CT abdomen/pelvis consistent with cholelithiasis and ascites.   - Repeat RUQ US negative fo cholecystitis rec HIDA if strong clinical suspicion.   -Bili/ transaminitis downtrended, but still with elevated alk phos.     HEME  #Thrombocytopenia- ELVIRA negative but PF4 was positive. Unlikely HIT. Most likely 2/2 zosyn or sepsis. Now resolved.  -Restart heparin gtt  -Continue to hold coumadin until clear he has no further procedures       #Elevated INR. Unclear etiology.   - Given Vit K and FFP3/12. FFP 3/13, FFP 4/4  - Monitor coags    #Anemia- Likely 2/2 phlebotomy and CKD, no signs of bleeding. Prior studies showed Anemia of chronic disease.  -Continue to monitor  -s/p 1pRBC on 3/12, 3/16 and 4/4    ENDOCRINE   - Lantus 10U  - MISS  - monitor FSG and adjust as needed   - A1C 8.6%      F: No IVF   E: Replete K<4 Mg<2, caution in setting of acute renal failure  N: PEG Placed 4/4, feeds changed to Vital 1.5 given diarrhea.   DVT ppx: on heparin drip  GI ppx: PPI 40 daily  Lines: R dialysis permacath (4/12)  Drips: Levophed  Full code  Dispo: MICU 63M PMH HFrEF 10-15% (ischemic), MI, s/p AICD vs PPM, possible Afib, HTN, DM2 on insulin, possible CKD, and gout, who presented with a chief complaint of generalized weakness s/p septic shock likely 2/2 LE cellulitis complicated by ATN requiring dialysis. Now with AMS likely from brainstem infarct and/or toxic metabolic encephalopathy, now with candidemia (resolving) and sepsis 2/2 klebsiella bacteremia. He was intiially treated with meropenem, then switched to zosyn, then finally to ceftriaxone when sensitivities were available. Hydrocortisone was discontinued due to infection. Patient has continued to have low grade temperatures and intermittently required levophed, hydrocortisone restarted on 4/17. Low threshold to remove HD catheter if patient decompensates. Patient also found to have small bowel edema and diarrhea, feeds switched to Vital and metamucil added, C diff ruled out.     NEURO  #Toxic Metabolic Encephalopathy- Patient acutely unresponsive since 3/22 and has had waxing/ waning mental status since. CT, CTA negative. VEEG w/ no seizure activity. Decline in mental status likely 2/2 to underlying infection vs acute neurologic event. 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 Per neurology may have had ischemic event from hypoperfusion. Also showing disc protrusion at C3/C4 level coursing superiorly to the C2/C3 contacting the ventral spinal cord. Per neurology, this may be contributing to weakness, however would not explain change in mental status.  S/p PEG/trach on 4/4. Repeat CT head 4/11 performed due to concern for not moving LUE, showing only chronic infarctions.  - C/w Modafinil  - monitor for neurological improvement with continued tx for bacteremia    ID    #Klebsiella Bacteremia- Urine, blood and sputum cx growing klebsiella. S/p Zosyn 4/14-4/15. S/p Meropenem 4/13. Sensitive to ceftriaxone.  -C/w Ceftriaxone 2g q 24 hrs (4/15-4/26)  -surveillance blood cultures NGTD   -f/u ID recs  -given clinical improvement, will maintain HD cath in place    #Candidemia with candida tropicalis- Blood cultures positive for candida tropicalis on 4/7 and again on 4/9. No source identified. CTAP 4/11 negative for abscess.  - c/w Fluconazole 200mg q 24hrs (4/13-4/24)  - Surveillance fungal cultures 4/10 NGTD  - RUKHSANA negative for vegetations/ infection of AICD  - Tunelled HD catheter removed 4/8, temp HD placed 4/10, permanent HD cath placed 4/12  - ID following, recs appreciated    #UTI- urine culture from 4/11 growing klebsiella sensitive to ceftriaxone, and now bacteremia with GNR likely 2/2 klebsiella (in urine and lung).     CARDIOVASCULAR   # HYPOTENSION-now off levophed  - c/w Midodrine 15 mg q8h to maintain SBP>65  - c/w hydrocortisone 25mg BID    # CHF exacerbation- CHF with EF 10-15% per pt 2/2 ischemic cardiomyopathy s/p AICD. Elevated BNP on admission with R sided effusion and edema. Patient with persistent pleural effusions on CXR and US and b/l dependent edema.   - Maintain negative fluid balance with dialysis  - c/w holding ACE/BB in setting of sepsis/ hypotension on midodrine    #AFIB- hx of Afib and LV thrombus on coumadin prior to admission; TTE with definity shows no LV thrombus currently   - c/w Heparin gtt  - c/w holding Metoprolol 12.5 q12h given hypotension. Will use Dig for rate control if RVR    #CAD- Hx of MI and ischemic CM s/p AICD, STEMI 7/2017, was started on Aspirin and Brilinta per records from [place]  -aspirin 81  -Atorvastatin 80mg   -on hep gtt    #LE Edema   - LE edema with chronic venous stasis  - Duplex does not show DVT    PULMONARY   #Chronic Resp failure- S/p tracheostomy 4/5. Bedside US with simple b/l pleural effusions and atelectatic lung. Less concern for infectious source given b/l effusions with no septations/loculations. Likely related to fluid overload. If clinically worsening, can consider thoracentesis for fluid studies and cultures.  - c/w daily CPAP trials, weaning to trach collar as tolerated  - c/w HD to remove excess fluid      RENAL   #Chronic renal failure- likely ischemic ATN in setting of sepsis with low intravascular volume. Unknown baseline Cr presenting with Cr 3.93 with metabolic derangements. Renal team on board, now on intermittent HD. HD catheter removed 4/8 due to fungemia, temporary HD catheter placed 4/10.  Now s/p permacath replacement 4/12. RP US showed medical renal disease.  - Last dialyzed 4/17  - c/w intermittent HD per renal recs      #Elevated AG- Fluctuating at low level; lactate negative, glucose has been well controlled, possibly 2/2 uremia in setting of ESRD.   - Monitor BMP    #Hyperkalemia- PT with intermittently elevated K,  no EKG changes.  - Continue telemetry monitoring  - Trend K   - c/w dialysis   - Kayexalate PRN    GI   #PEG in place: C/D/I  -c/w tube feeds    #Diarrhea likely 2/2 tube feeds, C.diff ruled out  -rectal tube in place    HEME  #Thrombocytopenia- ELVIRA negative but PF4 was positive. Unlikely HIT. Most likely 2/2 zosyn or sepsis.   -resolved  -monitor CBC    #Elevated INR. Unclear etiology.   - Given Vit K and FFP3/12. FFP 3/13, FFP 4/4  - Monitor coags    #Anemia- Likely 2/2 phlebotomy and CKD, no signs of bleeding. Prior studies showed Anemia of chronic disease. s/p 1pRBC on 3/12, 3/16 and 4/4  -monitor CBC    ENDOCRINE   #Diabetes: HbA1C 8.6  - wean off insulin gtt today and start lantus vs. NPH   - MISS  - monitor FSG and adjust as needed       F: No IVF   E: Replete K<4 Mg<2, caution in setting of acute renal failure  N: PEG Placed 4/4, feeds changed to Vital 1.5 given diarrhea.   DVT ppx: on heparin drip  GI ppx: PPI 40 daily  Lines: R dialysis permacath (4/12)  Drips: insulin  Full code  Dispo: MICU

## 2018-04-18 NOTE — PROGRESS NOTE ADULT - SUBJECTIVE AND OBJECTIVE BOX
Patient seen and examined at bedside. Patient is a 63 year old male with a history of HFrEF 10-15% due to ischemic cardiomyopathy, s/p AICD, ?atrial fibrillation, hypertension, diabetes mellitus type 2, gout, and CKD for whom nephrology was called for GILMER from ATN requiring hemodialysis. Patient appears clinically unchanged. Patient appears to be more awake today. Patient was last dialyzed 4/17 with UF of 1L.     acetaminophen    Suspension 650 milliGRAM(s) every 6 hours PRN  acetaminophen    Suspension. 650 milliGRAM(s) every 6 hours PRN  aspirin  chewable 81 milliGRAM(s) daily  atorvastatin 80 milliGRAM(s) at bedtime  cefTRIAXone Injectable. 2000 milliGRAM(s) every 24 hours  chlorhexidine 0.12% Liquid 15 milliLiter(s) two times a day  chlorhexidine 2% Cloths 1 Application(s) daily  dextrose 5%. 1000 milliLiter(s) <Continuous>  dextrose 50% Injectable 12.5 Gram(s) once  dextrose 50% Injectable 25 Gram(s) once  dextrose 50% Injectable 25 Gram(s) once  dextrose Gel 1 Dose(s) once PRN  ergocalciferol Drops 6000 Unit(s) daily  escitalopram 5 milliGRAM(s) daily  fluconAZOLE IVPB 200 milliGRAM(s) every 24 hours  folic acid 1 milliGRAM(s) daily  glucagon  Injectable 1 milliGRAM(s) once PRN  heparin  Infusion 1800 Unit(s)/Hr <Continuous>  hydrocortisone sodium succinate Injectable 25 milliGRAM(s) every 12 hours  insulin Infusion 4 Unit(s)/Hr <Continuous>  modafinil 100 milliGRAM(s) <User Schedule>  multivitamin 1 Tablet(s) daily  pantoprazole   Suspension 40 milliGRAM(s) daily  psyllium Powder 1 Packet(s) daily  thiamine 100 milliGRAM(s) daily    Allergies    No Known Allergies    Intolerances    T(C): , Max: 37.4 (04-18-18 @ 02:01)  T(F): , Max: 99.4 (04-18-18 @ 02:01)  HR: 98 (04-18-18 @ 10:00)  BP: 91/67 (04-18-18 @ 10:00)  BP(mean): 73 (04-18-18 @ 10:00)  RR: 25 (04-18-18 @ 10:00)  SpO2: 100% (04-18-18 @ 10:00)    04-17 @ 07:01  -  04-18 @ 07:00  --------------------------------------------------------  IN:    Albumin 25%: 150 mL    Enteral Tube Flush: 180 mL    heparin Infusion: 272 mL    heparin Infusion: 64 mL    insulin Infusion: 33.5 mL    norepinephrine Infusion: 84 mL    Solution: 100 mL    Vital HN: 1372 mL  Total IN: 2255.5 mL    OUT:    Other: 1000 mL    Rectal Tube: 400 mL  Total OUT: 1400 mL    Total NET: 855.5 mL    04-18 @ 07:01 - 04-18 @ 10:50  --------------------------------------------------------  IN:    heparin Infusion: 34 mL    insulin Infusion: 15.6 mL    Vital HN: 126 mL  Total IN: 175.6 mL    OUT:  Total OUT: 0 mL    Total NET: 175.6 mL    LABS:                        7.5    16.0  )-----------( 386      ( 18 Apr 2018 06:03 )             25.1     04-18    138  |  97  |  53<H>  ----------------------------<  364<H>  4.4   |  23  |  3.47<H>    Ca    9.0      18 Apr 2018 06:03  Phos  3.5     04-18  Mg     2.0     04-18    PTT - ( 18 Apr 2018 06:36 )  PTT:41.9 sec

## 2018-04-18 NOTE — PROGRESS NOTE ADULT - SUBJECTIVE AND OBJECTIVE BOX
INTERVAL HPI/OVERNIGHT EVENTS:    CONSTITUTIONAL:  Negative fever or chills, feels well, good appetite  EYES:  Negative  blurry vision or double vision  CARDIOVASCULAR:  Negative for chest pain or palpitations  RESPIRATORY:  Negative for cough, wheezing, or SOB   GASTROINTESTINAL:  Negative for nausea, vomiting, diarrhea, constipation, or abdominal pain  GENITOURINARY:  Negative frequency, urgency or dysuria  NEUROLOGIC:  No headache, confusion, dizziness, lightheadedness      ANTIBIOTICS/RELEVANT:    MEDICATIONS  (STANDING):  aspirin  chewable 81 milliGRAM(s) Oral daily  atorvastatin 80 milliGRAM(s) Oral at bedtime  cefTRIAXone Injectable. 2000 milliGRAM(s) IV Push every 24 hours  chlorhexidine 0.12% Liquid 15 milliLiter(s) Swish and Spit two times a day  chlorhexidine 2% Cloths 1 Application(s) Topical daily  dextrose 5%. 1000 milliLiter(s) (50 mL/Hr) IV Continuous <Continuous>  dextrose 50% Injectable 12.5 Gram(s) IV Push once  dextrose 50% Injectable 25 Gram(s) IV Push once  dextrose 50% Injectable 25 Gram(s) IV Push once  ergocalciferol Drops 6000 Unit(s) Oral daily  escitalopram 5 milliGRAM(s) Oral daily  fluconAZOLE IVPB 200 milliGRAM(s) IV Intermittent every 24 hours  folic acid 1 milliGRAM(s) Oral daily  heparin  Infusion 1800 Unit(s)/Hr (18 mL/Hr) IV Continuous <Continuous>  hydrocortisone sodium succinate Injectable 25 milliGRAM(s) IV Push every 12 hours  insulin glargine Injectable (LANTUS) 20 Unit(s) SubCutaneous at bedtime  insulin Infusion 4 Unit(s)/Hr (4 mL/Hr) IV Continuous <Continuous>  modafinil 100 milliGRAM(s) Oral <User Schedule>  multivitamin 1 Tablet(s) Oral daily  norepinephrine Infusion 0.01 MICROgram(s)/kG/Min (1.5 mL/Hr) IV Continuous <Continuous>  pantoprazole   Suspension 40 milliGRAM(s) Oral daily  psyllium Powder 1 Packet(s) Oral daily  thiamine 100 milliGRAM(s) Oral daily    MEDICATIONS  (PRN):  acetaminophen    Suspension 650 milliGRAM(s) Oral every 6 hours PRN For Temp greater than 38 C (100.4 F)  acetaminophen    Suspension. 650 milliGRAM(s) Oral every 6 hours PRN Moderate Pain (4 - 6)  dextrose Gel 1 Dose(s) Oral once PRN Blood Glucose LESS THAN 70 milliGRAM(s)/deciliter  glucagon  Injectable 1 milliGRAM(s) IntraMuscular once PRN Glucose LESS THAN 70 milligrams/deciliter        Vital Signs Last 24 Hrs  T(C): 36.4 (18 Apr 2018 06:16), Max: 37.4 (18 Apr 2018 02:01)  T(F): 97.6 (18 Apr 2018 06:16), Max: 99.4 (18 Apr 2018 02:01)  HR: 98 (18 Apr 2018 07:00) (86 - 114)  BP: 94/72 (18 Apr 2018 07:00) (77/64 - 104/72)  BP(mean): 79 (18 Apr 2018 07:00) (67 - 86)  RR: 19 (18 Apr 2018 07:00) (10 - 35)  SpO2: 100% (18 Apr 2018 07:00) (100% - 100%)    04-17-18 @ 07:01  -  04-18-18 @ 07:00  --------------------------------------------------------  IN: 2243 mL / OUT: 1300 mL / NET: 943 mL      PHYSICAL EXAM:  Constitutional:Well-developed, well nourished  Eyes:MAXI, EOMI  Ear/Nose/Throat: no oral lesion, no sinus tenderness on percussion	  Neck:no JVD, no lymphadenopathy, supple  Respiratory: CTA morris  Cardiovascular: S1S2 RRR, no murmurs  Gastrointestinal:soft, (+) BS, no HSM  Extremities:no e/e/c  Vascular: DP Pulse:	right normal; left normal      LABS:                        7.5    16.0  )-----------( 386      ( 18 Apr 2018 06:03 )             25.1     04-18    138  |  97  |  53<H>  ----------------------------<  364<H>  4.4   |  23  |  3.47<H>    Ca    9.0      18 Apr 2018 06:03  Phos  3.5     04-18  Mg     2.0     04-18      PTT - ( 18 Apr 2018 06:36 )  PTT:41.9 sec      MICROBIOLOGY:    RADIOLOGY & ADDITIONAL STUDIES: INTERVAL HPI/OVERNIGHT EVENTS:  No overnight events. Afebrile with WBC 16.0    CONSTITUTIONAL:  Negative fever or chills, feels well, good appetite  EYES:  Negative  blurry vision or double vision  CARDIOVASCULAR:  Negative for chest pain or palpitations  RESPIRATORY:  Negative for cough, wheezing, or SOB   GASTROINTESTINAL:  Negative for nausea, vomiting, diarrhea, constipation, or abdominal pain  GENITOURINARY:  Negative frequency, urgency or dysuria  NEUROLOGIC:  No headache, confusion, dizziness, lightheadedness      ANTIBIOTICS/RELEVANT:    MEDICATIONS  (STANDING):  aspirin  chewable 81 milliGRAM(s) Oral daily  atorvastatin 80 milliGRAM(s) Oral at bedtime  cefTRIAXone Injectable. 2000 milliGRAM(s) IV Push every 24 hours  chlorhexidine 0.12% Liquid 15 milliLiter(s) Swish and Spit two times a day  chlorhexidine 2% Cloths 1 Application(s) Topical daily  dextrose 5%. 1000 milliLiter(s) (50 mL/Hr) IV Continuous <Continuous>  dextrose 50% Injectable 12.5 Gram(s) IV Push once  dextrose 50% Injectable 25 Gram(s) IV Push once  dextrose 50% Injectable 25 Gram(s) IV Push once  ergocalciferol Drops 6000 Unit(s) Oral daily  escitalopram 5 milliGRAM(s) Oral daily  fluconAZOLE IVPB 200 milliGRAM(s) IV Intermittent every 24 hours  folic acid 1 milliGRAM(s) Oral daily  heparin  Infusion 1800 Unit(s)/Hr (18 mL/Hr) IV Continuous <Continuous>  hydrocortisone sodium succinate Injectable 25 milliGRAM(s) IV Push every 12 hours  insulin glargine Injectable (LANTUS) 20 Unit(s) SubCutaneous at bedtime  insulin Infusion 4 Unit(s)/Hr (4 mL/Hr) IV Continuous <Continuous>  modafinil 100 milliGRAM(s) Oral <User Schedule>  multivitamin 1 Tablet(s) Oral daily  norepinephrine Infusion 0.01 MICROgram(s)/kG/Min (1.5 mL/Hr) IV Continuous <Continuous>  pantoprazole   Suspension 40 milliGRAM(s) Oral daily  psyllium Powder 1 Packet(s) Oral daily  thiamine 100 milliGRAM(s) Oral daily    MEDICATIONS  (PRN):  acetaminophen    Suspension 650 milliGRAM(s) Oral every 6 hours PRN For Temp greater than 38 C (100.4 F)  acetaminophen    Suspension. 650 milliGRAM(s) Oral every 6 hours PRN Moderate Pain (4 - 6)  dextrose Gel 1 Dose(s) Oral once PRN Blood Glucose LESS THAN 70 milliGRAM(s)/deciliter  glucagon  Injectable 1 milliGRAM(s) IntraMuscular once PRN Glucose LESS THAN 70 milligrams/deciliter        Vital Signs Last 24 Hrs  T(C): 36.4 (18 Apr 2018 06:16), Max: 37.4 (18 Apr 2018 02:01)  T(F): 97.6 (18 Apr 2018 06:16), Max: 99.4 (18 Apr 2018 02:01)  HR: 98 (18 Apr 2018 07:00) (86 - 114)  BP: 94/72 (18 Apr 2018 07:00) (77/64 - 104/72)  BP(mean): 79 (18 Apr 2018 07:00) (67 - 86)  RR: 19 (18 Apr 2018 07:00) (10 - 35)  SpO2: 100% (18 Apr 2018 07:00) (100% - 100%)    04-17-18 @ 07:01  -  04-18-18 @ 07:00  --------------------------------------------------------  IN: 2243 mL / OUT: 1300 mL / NET: 943 mL      PHYSICAL EXAM:  General: NAD  HEENT: NCAT, PERRL, clear conjunctiva, no scleral icterus  Neck: supple, no JVD; with trach in place  Respiratory: CTA b/l  Cardiovascular: RRR, normal S1S2, no M/R/G  Abdomen: soft, NT/ND  Extremities: WWP, no edema  Neuro: AOx0    LABS:                        7.5    16.0  )-----------( 386      ( 18 Apr 2018 06:03 )             25.1     04-18    138  |  97  |  53<H>  ----------------------------<  364<H>  4.4   |  23  |  3.47<H>    Ca    9.0      18 Apr 2018 06:03  Phos  3.5     04-18  Mg     2.0     04-18      PTT - ( 18 Apr 2018 06:36 )  PTT:41.9 sec      MICROBIOLOGY:  Culture - Blood (04.14.18 @ 21:18)    Specimen Source: .Blood Blood    Culture Results:   No growth at 2 days.    Culture - Blood (04.14.18 @ 21:18)    Specimen Source: .Blood Blood-Arterial    Culture Results:   No growth at 2 days.    Culture - Fungal, Blood (04.13.18 @ 16:55)    Specimen Source: .Blood Blood    Culture Results:   Testing in progress    Culture - Sputum . (04.13.18 @ 10:35)    -  Tobramycin: S <=4    -  Piperacillin/Tazobactam: S <=16    -  Ciprofloxacin: S <=1    -  Gentamicin: S <=4    -  Ceftriaxone: S <=1 Enterobacter, Citrobacter, and Serratia may develop resistance during prolonged therapy    -  Cefazolin: S <=8    -  Ampicillin/Sulbactam: S <=8/4    -  Ampicillin: R >16 These ampicillin results predict results for amoxicillin    Gram Stain:   Rare epithelial cells  Numerous White blood cells  Numerous Gram Positive Rods  Few Gram Positive Cocci in Pairs and Chains    -  Trimethoprim/Sulfamethoxazole: S <=2/38    Specimen Source: .Sputum Sputum    Culture Results:   Numerous Klebsiella pneumoniae  Accompanied by normal respiratory blu    Organism Identification: Klebsiella pneumoniae    Organism: Klebsiella pneumoniae    Method Type: YONATHAN    RADIOLOGY & ADDITIONAL STUDIES:

## 2018-04-18 NOTE — PROGRESS NOTE ADULT - SUBJECTIVE AND OBJECTIVE BOX
INTERVAL HPI/OVERNIGHT EVENTS:    SUBJECTIVE: Patient seen and examined at bedside.     CONSTITUTIONAL: No weakness, fevers or chills  EYES/ENT: No visual changes;  No vertigo or throat pain   NECK: No pain or stiffness  RESPIRATORY: No cough, wheezing, hemoptysis; No shortness of breath  CARDIOVASCULAR: No chest pain or palpitations  GASTROINTESTINAL: No abdominal or epigastric pain. No nausea, vomiting, or hematemesis; No diarrhea or constipation. No melena or hematochezia.  GENITOURINARY: No dysuria, frequency or hematuria  NEUROLOGICAL: No numbness or weakness  SKIN: No itching, rashes    OBJECTIVE:    VITAL SIGNS:  ICU Vital Signs Last 24 Hrs  T(C): 37.4 (18 Apr 2018 02:01), Max: 37.4 (18 Apr 2018 02:01)  T(F): 99.4 (18 Apr 2018 02:01), Max: 99.4 (18 Apr 2018 02:01)  HR: 94 (18 Apr 2018 06:00) (86 - 114)  BP: 90/62 (18 Apr 2018 06:00) (77/64 - 104/72)  BP(mean): 69 (18 Apr 2018 06:00) (67 - 86)  ABP: --  ABP(mean): --  RR: 25 (18 Apr 2018 06:00) (10 - 35)  SpO2: 100% (18 Apr 2018 06:00) (100% - 100%)    Mode: AC/ CMV (Assist Control/ Continuous Mandatory Ventilation), RR (machine): 10, TV (machine): 500, FiO2: 40, PEEP: 5, ITime: 1, MAP: 8.8, PIP: 19    04-16 @ 07:01 - 04-17 @ 07:00  --------------------------------------------------------  IN: 1726 mL / OUT: 250 mL / NET: 1476 mL    04-17 @ 07:01 - 04-18 @ 06:54  --------------------------------------------------------  IN: 2068 mL / OUT: 1300 mL / NET: 768 mL      CAPILLARY BLOOD GLUCOSE      POCT Blood Glucose.: 329 mg/dL (18 Apr 2018 06:51)      PHYSICAL EXAM:    General: NAD  HEENT: NC/AT; PERRL, clear conjunctiva  Neck: supple  Respiratory: CTA b/l  Cardiovascular: +S1/S2; RRR  Abdomen: soft, NT/ND; +BS x4  Extremities: WWP, 2+ peripheral pulses b/l; no LE edema  Skin: normal color and turgor; no rash  Neurological:    MEDICATIONS:  MEDICATIONS  (STANDING):  aspirin  chewable 81 milliGRAM(s) Oral daily  atorvastatin 80 milliGRAM(s) Oral at bedtime  cefTRIAXone Injectable. 2000 milliGRAM(s) IV Push every 24 hours  chlorhexidine 0.12% Liquid 15 milliLiter(s) Swish and Spit two times a day  chlorhexidine 2% Cloths 1 Application(s) Topical daily  dextrose 5%. 1000 milliLiter(s) (50 mL/Hr) IV Continuous <Continuous>  dextrose 50% Injectable 12.5 Gram(s) IV Push once  dextrose 50% Injectable 25 Gram(s) IV Push once  dextrose 50% Injectable 25 Gram(s) IV Push once  ergocalciferol Drops 6000 Unit(s) Oral daily  escitalopram 5 milliGRAM(s) Oral daily  fluconAZOLE IVPB 200 milliGRAM(s) IV Intermittent every 24 hours  folic acid 1 milliGRAM(s) Oral daily  heparin  Infusion 1600 Unit(s)/Hr (16 mL/Hr) IV Continuous <Continuous>  hydrocortisone sodium succinate Injectable 25 milliGRAM(s) IV Push every 12 hours  insulin glargine Injectable (LANTUS) 20 Unit(s) SubCutaneous at bedtime  insulin Infusion 4 Unit(s)/Hr (4 mL/Hr) IV Continuous <Continuous>  insulin regular  human corrective regimen sliding scale   SubCutaneous every 6 hours  midodrine 15 milliGRAM(s) Oral every 8 hours  modafinil 100 milliGRAM(s) Oral <User Schedule>  multivitamin 1 Tablet(s) Oral daily  norepinephrine Infusion 0.01 MICROgram(s)/kG/Min (1.5 mL/Hr) IV Continuous <Continuous>  pantoprazole   Suspension 40 milliGRAM(s) Oral daily  psyllium Powder 1 Packet(s) Oral daily  thiamine 100 milliGRAM(s) Oral daily    MEDICATIONS  (PRN):  acetaminophen    Suspension 650 milliGRAM(s) Oral every 6 hours PRN For Temp greater than 38 C (100.4 F)  acetaminophen    Suspension. 650 milliGRAM(s) Oral every 6 hours PRN Moderate Pain (4 - 6)  dextrose Gel 1 Dose(s) Oral once PRN Blood Glucose LESS THAN 70 milliGRAM(s)/deciliter  glucagon  Injectable 1 milliGRAM(s) IntraMuscular once PRN Glucose LESS THAN 70 milligrams/deciliter      ALLERGIES:  Allergies    No Known Allergies    Intolerances        LABS:                        7.5    16.0  )-----------( 386      ( 18 Apr 2018 06:03 )             25.1     04-17    143  |  100  |  79<H>  ----------------------------<  185<H>  5.0   |  22  |  4.93<H>    Ca    9.4      17 Apr 2018 06:58  Phos  3.5     04-17  Mg     2.2     04-17      PTT - ( 17 Apr 2018 13:00 )  PTT:85.9 sec      RADIOLOGY & ADDITIONAL TESTS: Reviewed. INTERVAL HPI/OVERNIGHT EVENTS: Lantus 20 U administered at bedtime with repeat FSG 4 hours later at 356; started on insulin gtt at 4:30 am; MAPs remaining in 60s-70s; d/c'd off levophed    SUBJECTIVE: Patient seen and examined at bedside. NAD. No respiratory distress    CONSTITUTIONAL: No weakness, fevers or chills  EYES/ENT: No visual changes;  No vertigo or throat pain   NECK: No pain or stiffness  RESPIRATORY: No cough, wheezing, hemoptysis; No shortness of breath  CARDIOVASCULAR: No chest pain or palpitations  GASTROINTESTINAL: No abdominal or epigastric pain. No nausea, vomiting, or hematemesis; No diarrhea or constipation. No melena or hematochezia.  GENITOURINARY: No dysuria, frequency or hematuria  NEUROLOGICAL: No numbness or weakness  SKIN: No itching, rashes    OBJECTIVE:    VITAL SIGNS:  ICU Vital Signs Last 24 Hrs  T(C): 37.4 (18 Apr 2018 02:01), Max: 37.4 (18 Apr 2018 02:01)  T(F): 99.4 (18 Apr 2018 02:01), Max: 99.4 (18 Apr 2018 02:01)  HR: 94 (18 Apr 2018 06:00) (86 - 114)  BP: 90/62 (18 Apr 2018 06:00) (77/64 - 104/72)  BP(mean): 69 (18 Apr 2018 06:00) (67 - 86)  ABP: --  ABP(mean): --  RR: 25 (18 Apr 2018 06:00) (10 - 35)  SpO2: 100% (18 Apr 2018 06:00) (100% - 100%)    Mode: AC/ CMV (Assist Control/ Continuous Mandatory Ventilation), RR (machine): 10, TV (machine): 500, FiO2: 40, PEEP: 5, ITime: 1, MAP: 8.8, PIP: 19    04-16 @ 07:01  -  04-17 @ 07:00  --------------------------------------------------------  IN: 1726 mL / OUT: 250 mL / NET: 1476 mL    04-17 @ 07:01  -  04-18 @ 06:54  --------------------------------------------------------  IN: 2068 mL / OUT: 1300 mL / NET: 768 mL      CAPILLARY BLOOD GLUCOSE      POCT Blood Glucose.: 329 mg/dL (18 Apr 2018 06:51)      PHYSICAL EXAM:    General: NAD  HEENT: NC/AT; PERRL, clear conjunctiva  Neck: supple  Respiratory: CTA b/l  Cardiovascular: +S1/S2; RRR  Abdomen: soft, NT/ND; +BS x4  Extremities: WWP, 2+ peripheral pulses b/l; no LE edema  Skin: normal color and turgor; no rash  Neurological:    MEDICATIONS:  MEDICATIONS  (STANDING):  aspirin  chewable 81 milliGRAM(s) Oral daily  atorvastatin 80 milliGRAM(s) Oral at bedtime  cefTRIAXone Injectable. 2000 milliGRAM(s) IV Push every 24 hours  chlorhexidine 0.12% Liquid 15 milliLiter(s) Swish and Spit two times a day  chlorhexidine 2% Cloths 1 Application(s) Topical daily  dextrose 5%. 1000 milliLiter(s) (50 mL/Hr) IV Continuous <Continuous>  dextrose 50% Injectable 12.5 Gram(s) IV Push once  dextrose 50% Injectable 25 Gram(s) IV Push once  dextrose 50% Injectable 25 Gram(s) IV Push once  ergocalciferol Drops 6000 Unit(s) Oral daily  escitalopram 5 milliGRAM(s) Oral daily  fluconAZOLE IVPB 200 milliGRAM(s) IV Intermittent every 24 hours  folic acid 1 milliGRAM(s) Oral daily  heparin  Infusion 1600 Unit(s)/Hr (16 mL/Hr) IV Continuous <Continuous>  hydrocortisone sodium succinate Injectable 25 milliGRAM(s) IV Push every 12 hours  insulin glargine Injectable (LANTUS) 20 Unit(s) SubCutaneous at bedtime  insulin Infusion 4 Unit(s)/Hr (4 mL/Hr) IV Continuous <Continuous>  insulin regular  human corrective regimen sliding scale   SubCutaneous every 6 hours  midodrine 15 milliGRAM(s) Oral every 8 hours  modafinil 100 milliGRAM(s) Oral <User Schedule>  multivitamin 1 Tablet(s) Oral daily  norepinephrine Infusion 0.01 MICROgram(s)/kG/Min (1.5 mL/Hr) IV Continuous <Continuous>  pantoprazole   Suspension 40 milliGRAM(s) Oral daily  psyllium Powder 1 Packet(s) Oral daily  thiamine 100 milliGRAM(s) Oral daily    MEDICATIONS  (PRN):  acetaminophen    Suspension 650 milliGRAM(s) Oral every 6 hours PRN For Temp greater than 38 C (100.4 F)  acetaminophen    Suspension. 650 milliGRAM(s) Oral every 6 hours PRN Moderate Pain (4 - 6)  dextrose Gel 1 Dose(s) Oral once PRN Blood Glucose LESS THAN 70 milliGRAM(s)/deciliter  glucagon  Injectable 1 milliGRAM(s) IntraMuscular once PRN Glucose LESS THAN 70 milligrams/deciliter      ALLERGIES:  Allergies    No Known Allergies    Intolerances        LABS:                        7.5    16.0  )-----------( 386      ( 18 Apr 2018 06:03 )             25.1     04-17    143  |  100  |  79<H>  ----------------------------<  185<H>  5.0   |  22  |  4.93<H>    Ca    9.4      17 Apr 2018 06:58  Phos  3.5     04-17  Mg     2.2     04-17      PTT - ( 17 Apr 2018 13:00 )  PTT:85.9 sec      RADIOLOGY & ADDITIONAL TESTS: Reviewed. INTERVAL HPI/OVERNIGHT EVENTS: Lantus 20 U administered at bedtime with repeat FSG 4 hours later at 356; started on insulin gtt at 4:30 am; MAPs remaining in 60s-70s; d/c'd off levophed    SUBJECTIVE: Patient seen and examined at bedside. NAD. No respiratory distress on ventilator. Pt resting comfortably in bed. Following some commands. Intermittently nodding to questions appropriately. Otherwise non-verbal but awake and alert. Unable to assess ROS.    OBJECTIVE:    VITAL SIGNS:  ICU Vital Signs Last 24 Hrs  T(C): 37.4 (18 Apr 2018 02:01), Max: 37.4 (18 Apr 2018 02:01)  T(F): 99.4 (18 Apr 2018 02:01), Max: 99.4 (18 Apr 2018 02:01)  HR: 94 (18 Apr 2018 06:00) (86 - 114)  BP: 90/62 (18 Apr 2018 06:00) (77/64 - 104/72)  BP(mean): 69 (18 Apr 2018 06:00) (67 - 86)  ABP: --  ABP(mean): --  RR: 25 (18 Apr 2018 06:00) (10 - 35)  SpO2: 100% (18 Apr 2018 06:00) (100% - 100%)    Mode: AC/ CMV (Assist Control/ Continuous Mandatory Ventilation), RR (machine): 10, TV (machine): 500, FiO2: 40, PEEP: 5, ITime: 1, MAP: 8.8, PIP: 19    04-16 @ 07:01 - 04-17 @ 07:00  --------------------------------------------------------  IN: 1726 mL / OUT: 250 mL / NET: 1476 mL    04-17 @ 07:01 - 04-18 @ 06:54  --------------------------------------------------------  IN: 2068 mL / OUT: 1300 mL / NET: 768 mL      CAPILLARY BLOOD GLUCOSE      POCT Blood Glucose.: 329 mg/dL (18 Apr 2018 06:51)      PHYSICAL EXAM:    General: NAD, no respiratory distress on vent, cachectic, ill-appearing   HEENT: NC/AT; PERRL, clear conjunctiva, MM dry  Neck: supple; trach in place  Respiratory: rhonchi b/l anterior upper lobes, no wheezing or rales  Cardiovascular: +S1/S2; RRR  Abdomen: soft, mildly distended and tympanic, mild TTP in all quadrants, +BS throughout, PEG in place C/D/I  Extremities: WWP, 2+ peripheral pulses b/l; 1+ pitting edema to thigh b/l  Skin: dry, b/l LE with peeling dry skin  Neurological: nonverbal, sometimes nodding to questions appropraitely, responding to some commands (open eyes), RUE flexed and rigid, LUE extended and w/o spontaneous movement; b/l LE without spontaneous movement    MEDICATIONS:  MEDICATIONS  (STANDING):  aspirin  chewable 81 milliGRAM(s) Oral daily  atorvastatin 80 milliGRAM(s) Oral at bedtime  cefTRIAXone Injectable. 2000 milliGRAM(s) IV Push every 24 hours  chlorhexidine 0.12% Liquid 15 milliLiter(s) Swish and Spit two times a day  chlorhexidine 2% Cloths 1 Application(s) Topical daily  dextrose 5%. 1000 milliLiter(s) (50 mL/Hr) IV Continuous <Continuous>  dextrose 50% Injectable 12.5 Gram(s) IV Push once  dextrose 50% Injectable 25 Gram(s) IV Push once  dextrose 50% Injectable 25 Gram(s) IV Push once  ergocalciferol Drops 6000 Unit(s) Oral daily  escitalopram 5 milliGRAM(s) Oral daily  fluconAZOLE IVPB 200 milliGRAM(s) IV Intermittent every 24 hours  folic acid 1 milliGRAM(s) Oral daily  heparin  Infusion 1600 Unit(s)/Hr (16 mL/Hr) IV Continuous <Continuous>  hydrocortisone sodium succinate Injectable 25 milliGRAM(s) IV Push every 12 hours  insulin glargine Injectable (LANTUS) 20 Unit(s) SubCutaneous at bedtime  insulin Infusion 4 Unit(s)/Hr (4 mL/Hr) IV Continuous <Continuous>  insulin regular  human corrective regimen sliding scale   SubCutaneous every 6 hours  midodrine 15 milliGRAM(s) Oral every 8 hours  modafinil 100 milliGRAM(s) Oral <User Schedule>  multivitamin 1 Tablet(s) Oral daily  norepinephrine Infusion 0.01 MICROgram(s)/kG/Min (1.5 mL/Hr) IV Continuous <Continuous>  pantoprazole   Suspension 40 milliGRAM(s) Oral daily  psyllium Powder 1 Packet(s) Oral daily  thiamine 100 milliGRAM(s) Oral daily    MEDICATIONS  (PRN):  acetaminophen    Suspension 650 milliGRAM(s) Oral every 6 hours PRN For Temp greater than 38 C (100.4 F)  acetaminophen    Suspension. 650 milliGRAM(s) Oral every 6 hours PRN Moderate Pain (4 - 6)  dextrose Gel 1 Dose(s) Oral once PRN Blood Glucose LESS THAN 70 milliGRAM(s)/deciliter  glucagon  Injectable 1 milliGRAM(s) IntraMuscular once PRN Glucose LESS THAN 70 milligrams/deciliter      ALLERGIES:  Allergies    No Known Allergies    Intolerances        LABS:                        7.5    16.0  )-----------( 386      ( 18 Apr 2018 06:03 )             25.1     04-17    143  |  100  |  79<H>  ----------------------------<  185<H>  5.0   |  22  |  4.93<H>    Ca    9.4      17 Apr 2018 06:58  Phos  3.5     04-17  Mg     2.2     04-17      PTT - ( 17 Apr 2018 13:00 )  PTT:85.9 sec      RADIOLOGY & ADDITIONAL TESTS: Reviewed.

## 2018-04-18 NOTE — PROGRESS NOTE ADULT - ASSESSMENT
IMPRESSION: C. tropicalis BSI likely 2/2 PC s/p removal 4/8 with subsequent clearance of fungemia. Negative RUKHSANA and ophtho eval. R IJ Palindrome HD catheter placed 4/12. Now with Klebsiella BSI 2/2 UTI.     Recommend:  1. Continue fluconazole for candidemia until 4/21  2. Continue ceftriaxone 2g IV q24h until 4/21  3. Would clarify goals of care.    ID signing off. Please re-consult for any further questions

## 2018-04-18 NOTE — PROGRESS NOTE ADULT - ATTENDING COMMENTS
Now afebrile and off pressors. Surveillance bcx ngtd. MS remains poor. Continue antimicrobial therapy as outlined above. As per neurology there is concern for ?brain stem injury. f/u GOC and consider palliative care consult given low probability of recovery.    Please call with ?  ID service to see again as requested

## 2018-04-18 NOTE — PROGRESS NOTE ADULT - PROBLEM SELECTOR PLAN 1
Patient is a 63  year old male with acute on chronic renal failure from ischemic ATN. Patient is currently requiring hemodialysis. Patient was last dialyzed 4/17 with UF of 1L.     P - Patient does not require emergent dialysis today as volume status is acceptable   Next dialysis 4/19   Please check a bladder scan today

## 2018-04-19 LAB
ANION GAP SERPL CALC-SCNC: 18 MMOL/L — HIGH (ref 5–17)
ANISOCYTOSIS BLD QL: SLIGHT — SIGNIFICANT CHANGE UP
APTT BLD: 74.2 SEC — HIGH (ref 27.5–37.4)
BASOPHILS NFR BLD AUTO: 0.1 % — SIGNIFICANT CHANGE UP (ref 0–2)
BUN SERPL-MCNC: 64 MG/DL — HIGH (ref 7–23)
CALCIUM SERPL-MCNC: 9.3 MG/DL — SIGNIFICANT CHANGE UP (ref 8.4–10.5)
CHLORIDE SERPL-SCNC: 96 MMOL/L — SIGNIFICANT CHANGE UP (ref 96–108)
CO2 SERPL-SCNC: 25 MMOL/L — SIGNIFICANT CHANGE UP (ref 22–31)
CREAT SERPL-MCNC: 4.19 MG/DL — HIGH (ref 0.5–1.3)
CULTURE RESULTS: SIGNIFICANT CHANGE UP
CULTURE RESULTS: SIGNIFICANT CHANGE UP
DACRYOCYTES BLD QL SMEAR: SLIGHT — SIGNIFICANT CHANGE UP
EOSINOPHIL NFR BLD AUTO: 0 % — SIGNIFICANT CHANGE UP (ref 0–6)
GIANT PLATELETS BLD QL SMEAR: PRESENT — SIGNIFICANT CHANGE UP
GLUCOSE BLDC GLUCOMTR-MCNC: 127 MG/DL — HIGH (ref 70–99)
GLUCOSE BLDC GLUCOMTR-MCNC: 165 MG/DL — HIGH (ref 70–99)
GLUCOSE BLDC GLUCOMTR-MCNC: 260 MG/DL — HIGH (ref 70–99)
GLUCOSE BLDC GLUCOMTR-MCNC: 275 MG/DL — HIGH (ref 70–99)
GLUCOSE SERPL-MCNC: 232 MG/DL — HIGH (ref 70–99)
HBV SURFACE AB SER-ACNC: SIGNIFICANT CHANGE UP
HBV SURFACE AG SER-ACNC: SIGNIFICANT CHANGE UP
HCT VFR BLD CALC: 26.4 % — LOW (ref 39–50)
HGB BLD-MCNC: 8.1 G/DL — LOW (ref 13–17)
HYPOCHROMIA BLD QL: SLIGHT — SIGNIFICANT CHANGE UP
LG PLATELETS BLD QL AUTO: PRESENT — SIGNIFICANT CHANGE UP
LYMPHOCYTES # BLD AUTO: 2 % — LOW (ref 13–44)
LYMPHOCYTES # BLD AUTO: 4 % — LOW (ref 13–44)
MACROCYTES BLD QL: SLIGHT — SIGNIFICANT CHANGE UP
MAGNESIUM SERPL-MCNC: 2 MG/DL — SIGNIFICANT CHANGE UP (ref 1.6–2.6)
MANUAL DIF COMMENT BLD-IMP: SIGNIFICANT CHANGE UP
MANUAL SMEAR VERIFICATION: SIGNIFICANT CHANGE UP
MCHC RBC-ENTMCNC: 28.7 PG — SIGNIFICANT CHANGE UP (ref 27–34)
MCHC RBC-ENTMCNC: 30.7 G/DL — LOW (ref 32–36)
MCV RBC AUTO: 93.6 FL — SIGNIFICANT CHANGE UP (ref 80–100)
MICROCYTES BLD QL: SLIGHT — SIGNIFICANT CHANGE UP
MONOCYTES NFR BLD AUTO: 2 % — SIGNIFICANT CHANGE UP (ref 2–14)
MONOCYTES NFR BLD AUTO: 3.5 % — SIGNIFICANT CHANGE UP (ref 2–14)
NEUTROPHILS NFR BLD AUTO: 75 % — SIGNIFICANT CHANGE UP (ref 43–77)
NEUTROPHILS NFR BLD AUTO: 92.4 % — HIGH (ref 43–77)
NEUTS BAND # BLD: 21 % — HIGH
OVALOCYTES BLD QL SMEAR: SLIGHT — SIGNIFICANT CHANGE UP
PHOSPHATE SERPL-MCNC: 4.2 MG/DL — SIGNIFICANT CHANGE UP (ref 2.5–4.5)
PLAT MORPH BLD: (no result)
PLATELET # BLD AUTO: 416 K/UL — HIGH (ref 150–400)
POIKILOCYTOSIS BLD QL AUTO: SLIGHT — SIGNIFICANT CHANGE UP
POLYCHROMASIA BLD QL SMEAR: SLIGHT — SIGNIFICANT CHANGE UP
POTASSIUM SERPL-MCNC: 4.7 MMOL/L — SIGNIFICANT CHANGE UP (ref 3.5–5.3)
POTASSIUM SERPL-SCNC: 4.7 MMOL/L — SIGNIFICANT CHANGE UP (ref 3.5–5.3)
RBC # BLD: 2.82 M/UL — LOW (ref 4.2–5.8)
RBC # FLD: 18.5 % — HIGH (ref 10.3–16.9)
RBC BLD AUTO: (no result)
SMUDGE CELLS # BLD: SIGNIFICANT CHANGE UP
SODIUM SERPL-SCNC: 139 MMOL/L — SIGNIFICANT CHANGE UP (ref 135–145)
SPECIMEN SOURCE: SIGNIFICANT CHANGE UP
SPECIMEN SOURCE: SIGNIFICANT CHANGE UP
SPHEROCYTES BLD QL SMEAR: SLIGHT — SIGNIFICANT CHANGE UP
TARGETS BLD QL SMEAR: SLIGHT — SIGNIFICANT CHANGE UP
WBC # BLD: 17.6 K/UL — HIGH (ref 3.8–10.5)
WBC # FLD AUTO: 17.6 K/UL — HIGH (ref 3.8–10.5)

## 2018-04-19 PROCEDURE — 76937 US GUIDE VASCULAR ACCESS: CPT | Mod: 26

## 2018-04-19 PROCEDURE — 72125 CT NECK SPINE W/O DYE: CPT | Mod: 26

## 2018-04-19 PROCEDURE — 36556 INSERT NON-TUNNEL CV CATH: CPT | Mod: 78

## 2018-04-19 PROCEDURE — 36589 REMOVAL TUNNELED CV CATH: CPT | Mod: 78

## 2018-04-19 PROCEDURE — 99233 SBSQ HOSP IP/OBS HIGH 50: CPT | Mod: GC

## 2018-04-19 PROCEDURE — 77001 FLUOROGUIDE FOR VEIN DEVICE: CPT | Mod: 26

## 2018-04-19 RX ORDER — INSULIN LISPRO 100/ML
7 VIAL (ML) SUBCUTANEOUS EVERY 6 HOURS
Qty: 0 | Refills: 0 | Status: DISCONTINUED | OUTPATIENT
Start: 2018-04-19 | End: 2018-04-19

## 2018-04-19 RX ORDER — HEPARIN SODIUM 5000 [USP'U]/ML
1700 INJECTION INTRAVENOUS; SUBCUTANEOUS
Qty: 25000 | Refills: 0 | Status: DISCONTINUED | OUTPATIENT
Start: 2018-04-19 | End: 2018-04-20

## 2018-04-19 RX ORDER — INSULIN HUMAN 100 [IU]/ML
7 INJECTION, SOLUTION SUBCUTANEOUS EVERY 6 HOURS
Qty: 0 | Refills: 0 | Status: DISCONTINUED | OUTPATIENT
Start: 2018-04-19 | End: 2018-04-20

## 2018-04-19 RX ORDER — SODIUM CHLORIDE 9 MG/ML
250 INJECTION INTRAMUSCULAR; INTRAVENOUS; SUBCUTANEOUS ONCE
Qty: 0 | Refills: 0 | Status: COMPLETED | OUTPATIENT
Start: 2018-04-19 | End: 2018-04-19

## 2018-04-19 RX ORDER — INSULIN HUMAN 100 [IU]/ML
2 INJECTION, SOLUTION SUBCUTANEOUS ONCE
Qty: 0 | Refills: 0 | Status: COMPLETED | OUTPATIENT
Start: 2018-04-19 | End: 2018-04-19

## 2018-04-19 RX ORDER — INSULIN GLARGINE 100 [IU]/ML
36 INJECTION, SOLUTION SUBCUTANEOUS AT BEDTIME
Qty: 0 | Refills: 0 | Status: DISCONTINUED | OUTPATIENT
Start: 2018-04-19 | End: 2018-04-20

## 2018-04-19 RX ORDER — ALBUMIN HUMAN 25 %
50 VIAL (ML) INTRAVENOUS
Qty: 0 | Refills: 0 | Status: COMPLETED | OUTPATIENT
Start: 2018-04-19 | End: 2018-04-19

## 2018-04-19 RX ORDER — DOXERCALCIFEROL 2.5 UG/1
2 CAPSULE ORAL ONCE
Qty: 0 | Refills: 0 | Status: COMPLETED | OUTPATIENT
Start: 2018-04-19 | End: 2018-04-19

## 2018-04-19 RX ORDER — DEXTROSE 10 % IN WATER 10 %
1000 INTRAVENOUS SOLUTION INTRAVENOUS
Qty: 0 | Refills: 0 | Status: DISCONTINUED | OUTPATIENT
Start: 2018-04-19 | End: 2018-04-19

## 2018-04-19 RX ADMIN — CHLORHEXIDINE GLUCONATE 1 APPLICATION(S): 213 SOLUTION TOPICAL at 06:35

## 2018-04-19 RX ADMIN — ATORVASTATIN CALCIUM 80 MILLIGRAM(S): 80 TABLET, FILM COATED ORAL at 22:05

## 2018-04-19 RX ADMIN — Medication 50 MILLILITER(S): at 11:50

## 2018-04-19 RX ADMIN — Medication 25 MILLIGRAM(S): at 10:30

## 2018-04-19 RX ADMIN — SODIUM CHLORIDE 1000 MILLILITER(S): 9 INJECTION INTRAMUSCULAR; INTRAVENOUS; SUBCUTANEOUS at 19:23

## 2018-04-19 RX ADMIN — Medication 60 MILLILITER(S): at 10:31

## 2018-04-19 RX ADMIN — Medication 25 MILLIGRAM(S): at 22:06

## 2018-04-19 RX ADMIN — Medication 1 MILLIGRAM(S): at 16:35

## 2018-04-19 RX ADMIN — Medication 50 MILLILITER(S): at 10:44

## 2018-04-19 RX ADMIN — CHLORHEXIDINE GLUCONATE 15 MILLILITER(S): 213 SOLUTION TOPICAL at 16:34

## 2018-04-19 RX ADMIN — INSULIN HUMAN 6: 100 INJECTION, SOLUTION SUBCUTANEOUS at 06:34

## 2018-04-19 RX ADMIN — FLUCONAZOLE 100 MILLIGRAM(S): 150 TABLET ORAL at 22:05

## 2018-04-19 RX ADMIN — Medication 81 MILLIGRAM(S): at 16:36

## 2018-04-19 RX ADMIN — INSULIN HUMAN 6: 100 INJECTION, SOLUTION SUBCUTANEOUS at 23:19

## 2018-04-19 RX ADMIN — Medication 50 MILLILITER(S): at 09:50

## 2018-04-19 RX ADMIN — INSULIN HUMAN 2 UNIT(S): 100 INJECTION, SOLUTION SUBCUTANEOUS at 07:31

## 2018-04-19 RX ADMIN — INSULIN HUMAN 5 UNIT(S): 100 INJECTION, SOLUTION SUBCUTANEOUS at 06:34

## 2018-04-19 RX ADMIN — ERGOCALCIFEROL 6000 UNIT(S): 1.25 CAPSULE ORAL at 16:38

## 2018-04-19 RX ADMIN — CHLORHEXIDINE GLUCONATE 15 MILLILITER(S): 213 SOLUTION TOPICAL at 22:05

## 2018-04-19 RX ADMIN — Medication 100 MILLIGRAM(S): at 16:35

## 2018-04-19 RX ADMIN — Medication 1 PACKET(S): at 16:36

## 2018-04-19 RX ADMIN — CEFTRIAXONE 2000 MILLIGRAM(S): 500 INJECTION, POWDER, FOR SOLUTION INTRAMUSCULAR; INTRAVENOUS at 16:35

## 2018-04-19 RX ADMIN — Medication 1 TABLET(S): at 16:35

## 2018-04-19 RX ADMIN — MODAFINIL 100 MILLIGRAM(S): 200 TABLET ORAL at 16:36

## 2018-04-19 RX ADMIN — HEPARIN SODIUM 17 UNIT(S)/HR: 5000 INJECTION INTRAVENOUS; SUBCUTANEOUS at 18:02

## 2018-04-19 RX ADMIN — INSULIN GLARGINE 36 UNIT(S): 100 INJECTION, SOLUTION SUBCUTANEOUS at 23:18

## 2018-04-19 RX ADMIN — ESCITALOPRAM OXALATE 5 MILLIGRAM(S): 10 TABLET, FILM COATED ORAL at 16:38

## 2018-04-19 RX ADMIN — PANTOPRAZOLE SODIUM 40 MILLIGRAM(S): 20 TABLET, DELAYED RELEASE ORAL at 16:36

## 2018-04-19 RX ADMIN — INSULIN HUMAN 7 UNIT(S): 100 INJECTION, SOLUTION SUBCUTANEOUS at 23:19

## 2018-04-19 RX ADMIN — INSULIN HUMAN 7 UNIT(S): 100 INJECTION, SOLUTION SUBCUTANEOUS at 16:39

## 2018-04-19 RX ADMIN — MODAFINIL 100 MILLIGRAM(S): 200 TABLET ORAL at 06:34

## 2018-04-19 RX ADMIN — DOXERCALCIFEROL 2 MICROGRAM(S): 2.5 CAPSULE ORAL at 11:31

## 2018-04-19 NOTE — PROGRESS NOTE ADULT - SUBJECTIVE AND OBJECTIVE BOX
Patient is a 63y Male seen and evaluated at bedside. Patient lying in bed remains confused and critically ill on ventialtory support. Patient still remain oligoanuric requiring intermittent HD treatment. BUN/Creatinine remains elevated. Patient confused and disoriented and unable to follow commands. Tracheostomy with ventilatory support present.       acetaminophen    Suspension 650 every 6 hours PRN  acetaminophen    Suspension. 650 every 6 hours PRN  albumin human 25% IVPB 50 every 1 hour  aspirin  chewable 81 daily  atorvastatin 80 at bedtime  cefTRIAXone Injectable. 2000 every 24 hours  chlorhexidine 0.12% Liquid 15 two times a day  chlorhexidine 2% Cloths 1 daily  dextrose 5%. 1000 <Continuous>  dextrose 50% Injectable 12.5 once  dextrose 50% Injectable 25 once  dextrose 50% Injectable 25 once  dextrose Gel 1 once PRN  ergocalciferol Drops 6000 daily  escitalopram 5 daily  fluconAZOLE IVPB 200 every 24 hours  folic acid 1 daily  glucagon  Injectable 1 once PRN  heparin  Infusion 1700 <Continuous>  hydrocortisone sodium succinate Injectable 25 every 12 hours  insulin glargine Injectable (LANTUS) 30 at bedtime  insulin regular  human corrective regimen sliding scale  every 6 hours  insulin regular  human recombinant 7 every 6 hours  modafinil 100 <User Schedule>  multivitamin 1 daily  pantoprazole   Suspension 40 daily  psyllium Powder 1 daily  thiamine 100 daily      Allergies    No Known Allergies    Intolerances        T(C): , Max: 37.6 (04-18-18 @ 14:14)  T(F): , Max: 99.6 (04-18-18 @ 14:14)  HR: 87 (04-19-18 @ 09:45)  BP: 93/66 (04-19-18 @ 09:45)  BP(mean): 79 (04-19-18 @ 08:00)  RR: 15 (04-19-18 @ 09:45)  SpO2: 100% (04-19-18 @ 09:45)  Wt(kg): --    04-18 @ 07:01  -  04-19 @ 07:00  --------------------------------------------------------  IN: 2294.8 mL / OUT: 100 mL / NET: 2194.8 mL    04-19 @ 07:01  -  04-19 @ 10:07  --------------------------------------------------------  IN: 80 mL / OUT: 0 mL / NET: 80 mL          Review of Systems:  Limited. Lying in bed confused and disoriented.      PHYSICAL EXAM:  GENERAL: Ill appearing, lying in bed, confsued and disoriented, in mild respiratory distress on ventilatory support at present  HEAD:  Atraumatic, Normocephalic,   EYES: Bilateral conjuctiva and scleral pallor+nt  Oral cavity: Oral mucosa dry and pale  NECK: Neck supple, No JVD  CHEST/LUNG: Bilateral decreased breath sounds, Right>left, Bibasilar rales and crepitation+nt  HEART: Regular rate and rhythm. HOMER II/VI at LPSB, No gallop, no rub   ABDOMEN: Soft, Nontender, non distended, BS+nt, No flank tenderness.   EXTREMITIES: Bilateral distal venous stasis dermatitis with trace edema.  Neurology: AAOx1, confused and disoriented,   SKIN: No rashes or lesions          ACCESS:     LABS:                        8.1    17.6  )-----------( 416      ( 19 Apr 2018 06:16 )             26.4     04-19    139  |  96  |  64<H>  ----------------------------<  232<H>  4.7   |  25  |  4.19<H>    Ca    9.3      19 Apr 2018 06:16  Phos  4.2     04-19  Mg     2.0     04-19        PTT - ( 19 Apr 2018 01:41 )  PTT:74.2 sec          RADIOLOGY & ADDITIONAL STUDIES:    < from: Xray Abdomen 1 View PORTABLE -Routine (04.16.18 @ 06:09) >    EXAM:  XR ABDOMEN PORTABLE ROUTINE 1V                          PROCEDURE DATE:  04/16/2018                     INTERPRETATION:  Portable abdomen    History: Congestive heart failure    Small bowel wall edema/wall thickening. Tube in upper abdomen.    Impression:    Small bowel wall thickening suggest clinical correlation for causes which   can include bowel wall edema. A follow-up abdomen CT scan may be helpful   for further evaluation.    Findings discussed with Dr. kidd 1:12 PM.            "Thank you for the opportunity to participate in the care of this   patient."        HESHAM BERRIOS M.D., ATTENDING RADIOLOGIST  This document has been electronically signed. Apr 16 2018  1:14PM                  < end of copied text >

## 2018-04-19 NOTE — PROGRESS NOTE ADULT - PROBLEM SELECTOR PLAN 1
Oligoanuric GILMER likely due to ischemic ATN requiring RRT at present.  Last HD treatment on 4/17 with UF of 1L.   Patient with net positive fluid balance of 10 L since admission  Will do HD treatment today for UF and clearances with albumin.

## 2018-04-19 NOTE — CHART NOTE - NSCHARTNOTEFT_GEN_A_CORE
Admitting Diagnosis:   Patient is a 63y old  Male who presents with a chief complaint of     PAST MEDICAL & SURGICAL HISTORY:  Type 2 diabetes mellitus with diabetic peripheral angiopathy without gangrene, with long-term curren  Essential hypertension, benign  Gout  Pacemaker  Chronic systolic heart failure  Myocardial infarction  No significant past surgical history      Current Nutrition Order:  Ordered for Vital 1.5 @ 63ml/hr x 24hr via PEG+ 2x prostat (1512ml TV, 2468kcal, 132g, 1155ml water)- not running at this time during HD      PO Intake: Good (%) [   ]  Fair (50-75%) [   ] Poor (<25%) [   ]- N/A TF dependent    GI Issues: No N/V noted by RN; diarrhea, receiving metamucil     Pain: Unable to assess at this time 2/2 vent     Skin Integrity:  L buttock stage 2 PU  L calf venous ulcer  Trach stage 3 PU    Labs:       139  |  96  |  64<H>  ----------------------------<  232<H>  4.7   |  25  |  4.19<H>    Ca    9.3      2018 06:16  Phos  4.2       Mg     2.0           CAPILLARY BLOOD GLUCOSE      POCT Blood Glucose.: 260 mg/dL (2018 06:30)  POCT Blood Glucose.: 209 mg/dL (2018 23:21)  POCT Blood Glucose.: 171 mg/dL (2018 17:40)  POCT Blood Glucose.: 142 mg/dL (2018 15:58)  POCT Blood Glucose.: 128 mg/dL (2018 15:01)  POCT Blood Glucose.: 138 mg/dL (2018 14:05)  POCT Blood Glucose.: 140 mg/dL (2018 13:01)  POCT Blood Glucose.: 141 mg/dL (2018 12:33)  POCT Blood Glucose.: 96 mg/dL (2018 12:01)  POCT Blood Glucose.: 125 mg/dL (2018 11:00)      Medications:  MEDICATIONS  (STANDING):  albumin human 25% IVPB 50 milliLiter(s) IV Intermittent every 1 hour  aspirin  chewable 81 milliGRAM(s) Oral daily  atorvastatin 80 milliGRAM(s) Oral at bedtime  cefTRIAXone Injectable. 2000 milliGRAM(s) IV Push every 24 hours  chlorhexidine 0.12% Liquid 15 milliLiter(s) Swish and Spit two times a day  chlorhexidine 2% Cloths 1 Application(s) Topical daily  dextrose 10%. 1000 milliLiter(s) (60 mL/Hr) IV Continuous <Continuous>  dextrose 5%. 1000 milliLiter(s) (50 mL/Hr) IV Continuous <Continuous>  dextrose 50% Injectable 12.5 Gram(s) IV Push once  dextrose 50% Injectable 25 Gram(s) IV Push once  dextrose 50% Injectable 25 Gram(s) IV Push once  doxercalciferol Injectable 2 MICROGram(s) IV Push once  ergocalciferol Drops 6000 Unit(s) Oral daily  escitalopram 5 milliGRAM(s) Oral daily  fluconAZOLE IVPB 200 milliGRAM(s) IV Intermittent every 24 hours  folic acid 1 milliGRAM(s) Oral daily  hydrocortisone sodium succinate Injectable 25 milliGRAM(s) IV Push every 12 hours  insulin glargine Injectable (LANTUS) 30 Unit(s) SubCutaneous at bedtime  insulin regular  human corrective regimen sliding scale   SubCutaneous every 6 hours  insulin regular  human recombinant 7 Unit(s) SubCutaneous every 6 hours  modafinil 100 milliGRAM(s) Oral <User Schedule>  multivitamin 1 Tablet(s) Oral daily  pantoprazole   Suspension 40 milliGRAM(s) Oral daily  psyllium Powder 1 Packet(s) Oral daily  thiamine 100 milliGRAM(s) Oral daily    MEDICATIONS  (PRN):  acetaminophen    Suspension 650 milliGRAM(s) Oral every 6 hours PRN For Temp greater than 38 C (100.4 F)  acetaminophen    Suspension. 650 milliGRAM(s) Oral every 6 hours PRN Moderate Pain (4 - 6)  dextrose Gel 1 Dose(s) Oral once PRN Blood Glucose LESS THAN 70 milliGRAM(s)/deciliter  glucagon  Injectable 1 milliGRAM(s) IntraMuscular once PRN Glucose LESS THAN 70 milligrams/deciliter      Weight:  Daily     Daily Weight in k.4 (2018 09:45)    Weight:  72.7kg ()  77.2kg ()  80.9 kg ()  84.5kg (3/31)  86.9kg (3/19)  94.7 kg (3/16)    Weight Change:   22kg weight loss since admit (HD initiation)    Estimated energy needs using 89 kg IBW; Needs estimated 2/2 vent/post-op/PU  Calories: 25-30 kcal/kg = 9497-1646 kcal/day  Protein: 1.4-1.6 g/kg = 125-142 g protein/day  Fluids: per team    Subjective:   S/p trach and PEG placements on . Candidemia found in blood cultures with bandemia, likely intraabdominal source of infection. Pt remains trached to vent, CPAP mode. TF currently held for HD. Crista WNL. GOC discussion still pending.     Previous Nutrition Diagnosis:   Increased protein-calorie needs RT increased demand for protein-calorie intake AEB on vent support    Active [X]  Resolved [   ]    New PES statement:     Goal:   Continue to meet % of nutrition needs via tolerated route.     Recommendations:  1. Continue Vital 1.5 via PEG @ 63mL/hr x 24hrs plus ProStat Sugar Free BID (200 kcal, 30g protein)   2. Consider addition of imodium   3. Continue to trend weights  4. Monitor POC BG   5. Continue to monitor for GOC and keep nutrition in line at all times     Education:   N/A-vent    Risk Level: High [X] Moderate [   ] Low [   ]

## 2018-04-19 NOTE — PROGRESS NOTE ADULT - SUBJECTIVE AND OBJECTIVE BOX
INTERVAL HPI/OVERNIGHT EVENTS:    SUBJECTIVE: Patient seen and examined at bedside.     CONSTITUTIONAL: No weakness, fevers or chills  EYES/ENT: No visual changes;  No vertigo or throat pain   NECK: No pain or stiffness  RESPIRATORY: No cough, wheezing, hemoptysis; No shortness of breath  CARDIOVASCULAR: No chest pain or palpitations  GASTROINTESTINAL: No abdominal or epigastric pain. No nausea, vomiting, or hematemesis; No diarrhea or constipation. No melena or hematochezia.  GENITOURINARY: No dysuria, frequency or hematuria  NEUROLOGICAL: No numbness or weakness  SKIN: No itching, rashes    OBJECTIVE:    VITAL SIGNS:  ICU Vital Signs Last 24 Hrs  T(C): 37.2 (19 Apr 2018 02:25), Max: 37.6 (18 Apr 2018 14:14)  T(F): 98.9 (19 Apr 2018 02:25), Max: 99.6 (18 Apr 2018 14:14)  HR: 106 (19 Apr 2018 06:00) (86 - 107)  BP: 98/71 (19 Apr 2018 06:00) (74/52 - 98/71)  BP(mean): 79 (19 Apr 2018 06:00) (59 - 82)  ABP: --  ABP(mean): --  RR: 23 (19 Apr 2018 06:00) (11 - 916)  SpO2: 100% (19 Apr 2018 06:00) (98% - 100%)    Mode: CPAP with PS, FiO2: 40, PEEP: 5, PS: 8, MAP: 8, PIP: 14    04-17 @ 07:01 - 04-18 @ 07:00  --------------------------------------------------------  IN: 2255.5 mL / OUT: 1400 mL / NET: 855.5 mL    04-18 @ 07:01 - 04-19 @ 06:29  --------------------------------------------------------  IN: 2214.8 mL / OUT: 100 mL / NET: 2114.8 mL      CAPILLARY BLOOD GLUCOSE      POCT Blood Glucose.: 209 mg/dL (18 Apr 2018 23:21)      PHYSICAL EXAM:    General: NAD  HEENT: NC/AT; PERRL, clear conjunctiva  Neck: supple  Respiratory: CTA b/l  Cardiovascular: +S1/S2; RRR  Abdomen: soft, NT/ND; +BS x4  Extremities: WWP, 2+ peripheral pulses b/l; no LE edema  Skin: normal color and turgor; no rash  Neurological:    MEDICATIONS:  MEDICATIONS  (STANDING):  aspirin  chewable 81 milliGRAM(s) Oral daily  atorvastatin 80 milliGRAM(s) Oral at bedtime  cefTRIAXone Injectable. 2000 milliGRAM(s) IV Push every 24 hours  chlorhexidine 0.12% Liquid 15 milliLiter(s) Swish and Spit two times a day  chlorhexidine 2% Cloths 1 Application(s) Topical daily  dextrose 5%. 1000 milliLiter(s) (50 mL/Hr) IV Continuous <Continuous>  dextrose 50% Injectable 12.5 Gram(s) IV Push once  dextrose 50% Injectable 25 Gram(s) IV Push once  dextrose 50% Injectable 25 Gram(s) IV Push once  ergocalciferol Drops 6000 Unit(s) Oral daily  escitalopram 5 milliGRAM(s) Oral daily  fluconAZOLE IVPB 200 milliGRAM(s) IV Intermittent every 24 hours  folic acid 1 milliGRAM(s) Oral daily  heparin  Infusion 1700 Unit(s)/Hr (17 mL/Hr) IV Continuous <Continuous>  hydrocortisone sodium succinate Injectable 25 milliGRAM(s) IV Push every 12 hours  insulin glargine Injectable (LANTUS) 30 Unit(s) SubCutaneous at bedtime  insulin regular  human corrective regimen sliding scale   SubCutaneous every 6 hours  insulin regular  human recombinant 5 Unit(s) SubCutaneous every 6 hours  modafinil 100 milliGRAM(s) Oral <User Schedule>  multivitamin 1 Tablet(s) Oral daily  pantoprazole   Suspension 40 milliGRAM(s) Oral daily  psyllium Powder 1 Packet(s) Oral daily  thiamine 100 milliGRAM(s) Oral daily    MEDICATIONS  (PRN):  acetaminophen    Suspension 650 milliGRAM(s) Oral every 6 hours PRN For Temp greater than 38 C (100.4 F)  acetaminophen    Suspension. 650 milliGRAM(s) Oral every 6 hours PRN Moderate Pain (4 - 6)  dextrose Gel 1 Dose(s) Oral once PRN Blood Glucose LESS THAN 70 milliGRAM(s)/deciliter  glucagon  Injectable 1 milliGRAM(s) IntraMuscular once PRN Glucose LESS THAN 70 milligrams/deciliter      ALLERGIES:  Allergies    No Known Allergies    Intolerances        LABS:                        7.5    16.0  )-----------( 386      ( 18 Apr 2018 06:03 )             25.1     04-18    138  |  97  |  53<H>  ----------------------------<  364<H>  4.4   |  23  |  3.47<H>    Ca    9.0      18 Apr 2018 06:03  Phos  3.5     04-18  Mg     2.0     04-18      PTT - ( 19 Apr 2018 01:41 )  PTT:74.2 sec      RADIOLOGY & ADDITIONAL TESTS: Reviewed. INTERVAL HPI/OVERNIGHT EVENTS:  o/n requiring additional 4 units of coverage in addition to lantus 30; MAPs maintaining 65-67    SUBJECTIVE: Patient seen and examined at bedside. NAD. No respiratory distress. Non-verbal. Less alert this AM. Responding to painful stimuli. Not following commands. Unable to assess ROS.    OBJECTIVE:    VITAL SIGNS:  ICU Vital Signs Last 24 Hrs  T(C): 37.2 (19 Apr 2018 02:25), Max: 37.6 (18 Apr 2018 14:14)  T(F): 98.9 (19 Apr 2018 02:25), Max: 99.6 (18 Apr 2018 14:14)  HR: 106 (19 Apr 2018 06:00) (86 - 107)  BP: 98/71 (19 Apr 2018 06:00) (74/52 - 98/71)  BP(mean): 79 (19 Apr 2018 06:00) (59 - 82)  ABP: --  ABP(mean): --  RR: 23 (19 Apr 2018 06:00) (11 - 916)  SpO2: 100% (19 Apr 2018 06:00) (98% - 100%)    Mode: CPAP with PS, FiO2: 40, PEEP: 5, PS: 8, MAP: 8, PIP: 14    04-17 @ 07:01 - 04-18 @ 07:00  --------------------------------------------------------  IN: 2255.5 mL / OUT: 1400 mL / NET: 855.5 mL    04-18 @ 07:01 - 04-19 @ 06:29  --------------------------------------------------------  IN: 2214.8 mL / OUT: 100 mL / NET: 2114.8 mL      CAPILLARY BLOOD GLUCOSE      POCT Blood Glucose.: 209 mg/dL (18 Apr 2018 23:21)      PHYSICAL EXAM:    General: NAD, no respiratory distress on vent, cachectic, chronically ill-appearing   HEENT: NC/AT; PERRL, clear conjunctiva, MM dry  Neck: supple; trach in place  Respiratory: CTA b/l, no W/R/R  Cardiovascular: +S1/S2; RRR  Abdomen: soft, mildly distended and tympanic on percussion, nontender, hypoactive BS, PEG in place C/D/I  Extremities: WWP, 2+ peripheral pulses b/l; 1-2+ pitting edema to level of thigh b/l  Skin: dry, b/l LE with peeling dry skin, wrinkled skin of b/l feet  Neurological: nonverbal, not following commands today, RUE flexed and rigid with tremor, LUE rigid but w/o spontaneous movement, no spontaneous movement of b/l LE, pt not nodding to questions today    MEDICATIONS:  MEDICATIONS  (STANDING):  aspirin  chewable 81 milliGRAM(s) Oral daily  atorvastatin 80 milliGRAM(s) Oral at bedtime  cefTRIAXone Injectable. 2000 milliGRAM(s) IV Push every 24 hours  chlorhexidine 0.12% Liquid 15 milliLiter(s) Swish and Spit two times a day  chlorhexidine 2% Cloths 1 Application(s) Topical daily  dextrose 5%. 1000 milliLiter(s) (50 mL/Hr) IV Continuous <Continuous>  dextrose 50% Injectable 12.5 Gram(s) IV Push once  dextrose 50% Injectable 25 Gram(s) IV Push once  dextrose 50% Injectable 25 Gram(s) IV Push once  ergocalciferol Drops 6000 Unit(s) Oral daily  escitalopram 5 milliGRAM(s) Oral daily  fluconAZOLE IVPB 200 milliGRAM(s) IV Intermittent every 24 hours  folic acid 1 milliGRAM(s) Oral daily  heparin  Infusion 1700 Unit(s)/Hr (17 mL/Hr) IV Continuous <Continuous>  hydrocortisone sodium succinate Injectable 25 milliGRAM(s) IV Push every 12 hours  insulin glargine Injectable (LANTUS) 30 Unit(s) SubCutaneous at bedtime  insulin regular  human corrective regimen sliding scale   SubCutaneous every 6 hours  insulin regular  human recombinant 5 Unit(s) SubCutaneous every 6 hours  modafinil 100 milliGRAM(s) Oral <User Schedule>  multivitamin 1 Tablet(s) Oral daily  pantoprazole   Suspension 40 milliGRAM(s) Oral daily  psyllium Powder 1 Packet(s) Oral daily  thiamine 100 milliGRAM(s) Oral daily    MEDICATIONS  (PRN):  acetaminophen    Suspension 650 milliGRAM(s) Oral every 6 hours PRN For Temp greater than 38 C (100.4 F)  acetaminophen    Suspension. 650 milliGRAM(s) Oral every 6 hours PRN Moderate Pain (4 - 6)  dextrose Gel 1 Dose(s) Oral once PRN Blood Glucose LESS THAN 70 milliGRAM(s)/deciliter  glucagon  Injectable 1 milliGRAM(s) IntraMuscular once PRN Glucose LESS THAN 70 milligrams/deciliter      ALLERGIES:  Allergies    No Known Allergies    Intolerances        LABS:                        7.5    16.0  )-----------( 386      ( 18 Apr 2018 06:03 )             25.1     04-18    138  |  97  |  53<H>  ----------------------------<  364<H>  4.4   |  23  |  3.47<H>    Ca    9.0      18 Apr 2018 06:03  Phos  3.5     04-18  Mg     2.0     04-18      PTT - ( 19 Apr 2018 01:41 )  PTT:74.2 sec      RADIOLOGY & ADDITIONAL TESTS: Reviewed.

## 2018-04-19 NOTE — PROGRESS NOTE ADULT - PROBLEM SELECTOR PLAN 4
Patient with calcium 9.3 and phosphrous 4.2 at present.  Elevated PTH and Low Vitamin D 25 and Vitamin D 1,25 level  Will give hectoral during HD treatment  Low phos/renal diet.

## 2018-04-19 NOTE — PROGRESS NOTE ADULT - PROBLEM SELECTOR PLAN 2
Acute on chronic systolic CHF with LVEF 15 % and severely low blood pressure  Please concentrate as many IV fluid drips as possible  Antihypertensive/inotropic support per primary/cardiology team

## 2018-04-19 NOTE — PROGRESS NOTE ADULT - SUBJECTIVE AND OBJECTIVE BOX
Patient was seen and evaluated on dialysis.   Patient is tolerating the procedure well.   HR: 96 (04-19-18 @ 11:18)  BP: 93/66 (04-19-18 @ 09:45)  Continue dialysis:   Dialyzer: Revaclear 300    QB: 300        QD: 500 2K+  Goal UF 1 kg over 3 Hours

## 2018-04-19 NOTE — PROGRESS NOTE ADULT - ASSESSMENT
63M PMH HFrEF 10-15% (ischemic), MI, s/p AICD vs PPM, possible Afib, HTN, DM2 on insulin, possible CKD, and gout, who presented with a chief complaint of generalized weakness s/p septic shock likely 2/2 LE cellulitis complicated by ATN requiring dialysis. Now with AMS likely from brainstem infarct and/or toxic metabolic encephalopathy, now with candidemia (resolving) and sepsis 2/2 klebsiella bacteremia. He was intiially treated with meropenem, then switched to zosyn, then finally to ceftriaxone when sensitivities were available. Hydrocortisone was discontinued due to infection. Patient has continued to have low grade temperatures and intermittently required levophed, hydrocortisone restarted on 4/17. Low threshold to remove HD catheter if patient decompensates. Patient also found to have small bowel edema and diarrhea, feeds switched to Vital and metamucil added, C diff ruled out.     NEURO  #Toxic Metabolic Encephalopathy- Patient acutely unresponsive since 3/22 and has had waxing/ waning mental status since. CT, CTA negative. VEEG w/ no seizure activity. Decline in mental status likely 2/2 to underlying infection vs acute neurologic event. 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 Per neurology may have had ischemic event from hypoperfusion. Also showing disc protrusion at C3/C4 level coursing superiorly to the C2/C3 contacting the ventral spinal cord. Per neurology, this may be contributing to weakness, however would not explain change in mental status.  S/p PEG/trach on 4/4. Repeat CT head 4/11 performed due to concern for not moving LUE, showing only chronic infarctions.  - C/w Modafinil  - monitor for neurological improvement with continued tx for bacteremia    ID    #Klebsiella Bacteremia- Urine, blood and sputum cx growing klebsiella. S/p Zosyn 4/14-4/15. S/p Meropenem 4/13. Sensitive to ceftriaxone.  -C/w Ceftriaxone 2g q 24 hrs (4/15-4/26)  -surveillance blood cultures NGTD   -f/u ID recs  -given clinical improvement, will maintain HD cath in place    #Candidemia with candida tropicalis- Blood cultures positive for candida tropicalis on 4/7 and again on 4/9. No source identified. CTAP 4/11 negative for abscess.  - c/w Fluconazole 200mg q 24hrs (4/13-4/24)  - Surveillance fungal cultures 4/10 NGTD  - RUKHSANA negative for vegetations/ infection of AICD  - Tunelled HD catheter removed 4/8, temp HD placed 4/10, permanent HD cath placed 4/12  - ID following, recs appreciated    #UTI- urine culture from 4/11 growing klebsiella sensitive to ceftriaxone, and now bacteremia with GNR likely 2/2 klebsiella (in urine and lung).     CARDIOVASCULAR   # HYPOTENSION-now off levophed  - c/w Midodrine 15 mg q8h to maintain SBP>65  - c/w hydrocortisone 25mg BID    # CHF exacerbation- CHF with EF 10-15% per pt 2/2 ischemic cardiomyopathy s/p AICD. Elevated BNP on admission with R sided effusion and edema. Patient with persistent pleural effusions on CXR and US and b/l dependent edema.   - Maintain negative fluid balance with dialysis  - c/w holding ACE/BB in setting of sepsis/ hypotension on midodrine    #AFIB- hx of Afib and LV thrombus on coumadin prior to admission; TTE with definity shows no LV thrombus currently   - c/w Heparin gtt  - c/w holding Metoprolol 12.5 q12h given hypotension. Will use Dig for rate control if RVR    #CAD- Hx of MI and ischemic CM s/p AICD, STEMI 7/2017, was started on Aspirin and Brilinta per records from [facility]  -aspirin 81  -Atorvastatin 80mg   -on hep gtt    #LE Edema   - LE edema with chronic venous stasis  - Duplex does not show DVT    PULMONARY   #Chronic Resp failure- S/p tracheostomy 4/5. Bedside US with simple b/l pleural effusions and atelectatic lung. Less concern for infectious source given b/l effusions with no septations/loculations. Likely related to fluid overload. If clinically worsening, can consider thoracentesis for fluid studies and cultures.  - c/w daily CPAP trials, weaning to trach collar as tolerated  - c/w HD to remove excess fluid      RENAL   #Chronic renal failure- likely ischemic ATN in setting of sepsis with low intravascular volume. Unknown baseline Cr presenting with Cr 3.93 with metabolic derangements. Renal team on board, now on intermittent HD. HD catheter removed 4/8 due to fungemia, temporary HD catheter placed 4/10.  Now s/p permacath replacement 4/12. RP US showed medical renal disease.  - Last dialyzed 4/17  - c/w intermittent HD per renal recs      #Elevated AG- Fluctuating at low level; lactate negative, glucose has been well controlled, possibly 2/2 uremia in setting of ESRD.   - Monitor BMP    #Hyperkalemia- PT with intermittently elevated K,  no EKG changes.  - Continue telemetry monitoring  - Trend K   - c/w dialysis   - Kayexalate PRN    GI   #PEG in place: C/D/I  -c/w tube feeds    #Diarrhea likely 2/2 tube feeds, C.diff ruled out  -rectal tube in place    HEME  #Thrombocytopenia- ELVIRA negative but PF4 was positive. Unlikely HIT. Most likely 2/2 zosyn or sepsis.   -resolved  -monitor CBC    #Elevated INR. Unclear etiology.   - Given Vit K and FFP3/12. FFP 3/13, FFP 4/4  - Monitor coags    #Anemia- Likely 2/2 phlebotomy and CKD, no signs of bleeding. Prior studies showed Anemia of chronic disease. s/p 1pRBC on 3/12, 3/16 and 4/4  -monitor CBC    ENDOCRINE   #Diabetes: HbA1C 8.6  - wean off insulin gtt today and start lantus vs. NPH   - MISS  - monitor FSG and adjust as needed       F: No IVF   E: Replete K<4 Mg<2, caution in setting of acute renal failure  N: PEG Placed 4/4, feeds changed to Vital 1.5 given diarrhea.   DVT ppx: on heparin drip  GI ppx: PPI 40 daily  Lines: R dialysis permacath (4/12)  Drips: insulin  Full code  Dispo: MICU 63M PMH HFrEF 10-15% (ischemic), MI, s/p AICD vs PPM, possible Afib, HTN, DM2 on insulin, possible CKD, and gout, who presented with a chief complaint of generalized weakness s/p septic shock likely 2/2 LE cellulitis complicated by ATN requiring dialysis. Now with AMS likely from brainstem infarct and/or toxic metabolic encephalopathy, now with candidemia (resolving) and sepsis 2/2 klebsiella bacteremia. He was intiially treated with meropenem, then switched to zosyn, then finally to ceftriaxone when sensitivities were available. Hydrocortisone was discontinued due to infection. Patient has continued to have low grade temperatures and intermittently required levophed, hydrocortisone restarted on 4/17. Low threshold to remove HD catheter if patient decompensates. Patient also found to have small bowel edema and diarrhea, feeds switched to Vital and metamucil added, C diff ruled out.     NEURO  #Toxic Metabolic Encephalopathy- Likely 2/2 acute infarct on imaging; Patient acutely unresponsive since 3/22 and has had waxing/ waning mental status since. CT, CTA negative. VEEG w/ no seizure activity. Decline in mental status likely 2/2 to underlying infection vs acute neurologic event. 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 Per neurology may have had ischemic event from hypoperfusion. Also showing disc protrusion at C3/C4 level coursing superiorly to the C2/C3 contacting the ventral spinal cord. Per neurology, this may be contributing to weakness, however would not explain change in mental status.  S/p PEG/trach on 4/4. Repeat CT head 4/11 performed due to concern for not moving LUE, showing only chronic infarctions.  - C/w Modafinil  - CT C-spine w/o contrast ordered     ID    #Klebsiella Bacteremia- Urine, blood and sputum cx growing klebsiella. S/p Zosyn 4/14-4/15. S/p Meropenem 4/13. Sensitive to ceftriaxone.  -C/w Ceftriaxone 2g q 24 hrs (4/15-4/26)  -surveillance blood cultures NGTD   -f/u ID recs  -leukocytosis increased to 17 today despite on ABX and off steroids; will remove permacath today for source control    #Candidemia with candida tropicalis- Blood cultures positive for candida tropicalis on 4/7 and again on 4/9. No source identified. CTAP 4/11 negative for abscess. RUKHSANA negative for vegetations/ infection of AICD; Tunelled HD catheter removed 4/8, temp HD placed 4/10, permanent HD cath placed 4/12  - c/w Fluconazole 200mg q 24hrs (4/13-4/24)  - Surveillance fungal cultures 4/10 NGTD  - ID following, f/u recs  - removing permacath today    #UTI- urine culture from 4/11 growing klebsiella sensitive to ceftriaxone, and now bacteremia with GNR likely 2/2 klebsiella (in urine and lung).  -c/w management as per above    CARDIOVASCULAR   # HYPOTENSION-now off levophed  - c/w Midodrine 15 mg q8h to maintain SBP>65  - c/w hydrocortisone 25mg BID    # CHF exacerbation- CHF with EF 10-15% per pt 2/2 ischemic cardiomyopathy s/p AICD. Elevated BNP on admission with R sided effusion and edema. Patient with persistent pleural effusions on CXR and US and b/l dependent edema.   - Maintain negative fluid balance with dialysis  - c/w holding ACE/BB in setting of sepsis/ hypotension on midodrine    #AFIB- hx of Afib and LV thrombus on coumadin prior to admission; TTE with definity shows no LV thrombus currently   - c/w Heparin gtt (holding for IR procedure)  - c/w holding Metoprolol 12.5 q12h given hypotension. Will use Dig for rate control if RVR  - HR 90s-100s; goal <110    #CAD- Hx of MI and ischemic CM s/p AICD, STEMI 7/2017, was started on Aspirin and Brilinta per records from [facility]  -aspirin 81  -Atorvastatin 80mg   -on hep gtt (holding for IR procedure)    #LE Edema   - LE edema with chronic venous stasis  - Duplex does not show DVT    PULMONARY   #Chronic Resp failure- S/p tracheostomy 4/5. Bedside US with simple b/l pleural effusions and atelectatic lung. Less concern for infectious source given b/l effusions with no septations/loculations. Likely related to fluid overload. If clinically worsening, can consider thoracentesis for fluid studies and cultures.  - c/w daily CPAP trials, weaning to trach collar as tolerated  - c/w HD to remove excess fluid    RENAL   #Chronic renal failure- likely ischemic ATN in setting of sepsis with low intravascular volume. Unknown baseline Cr presenting with Cr 3.93 with metabolic derangements. Renal team on board, now on intermittent HD. HD catheter removed 4/8 due to fungemia, temporary HD catheter placed 4/10.  Now s/p permacath replacement 4/12. RP US showed medical renal disease.  - Last dialyzed 4/19  - c/w intermittent HD per renal recs    - removing permacath today and exchanging for temp HD cath    #Adrenal Insufficiency: BP improved immediately after hydrocortisone and able to wean off levophed  -c/w hydrocortisone 25 mg BID    #Elevated AG- Fluctuating at low level; lactate negative, glucose has been well controlled, possibly 2/2 uremia in setting of ESRD.   - Monitor BMP    #Hyperkalemia- PT with intermittently elevated K,  no EKG changes.  - Continue telemetry monitoring  - Trend K   - c/w dialysis   - Kayexalate PRN    GI   #PEG in place: C/D/I  -c/w tube feeds (holding for IR procedure)    #Diarrhea likely 2/2 tube feeds, C.diff ruled out  -rectal tube in place    HEME  #Thrombocytopenia- ELVIRA negative but PF4 was positive. Unlikely HIT. Most likely 2/2 zosyn or sepsis.   -resolved  -monitor CBC    #Elevated INR. Unclear etiology.   - Given Vit K and FFP3/12. FFP 3/13, FFP 4/4  - Monitor coags    #Anemia- Likely 2/2 phlebotomy and CKD, no signs of bleeding. Prior studies showed Anemia of chronic disease. s/p 1pRBC on 3/12, 3/16 and 4/4  -monitor CBC    ENDOCRINE   #Diabetes: HbA1C 8.6  - wean off insulin gtt today and start lantus vs. NPH   - MISS  - monitor FSG and adjust as needed       F: No IVF   E: Replete K<4 Mg<2, caution in setting of acute renal failure  N: PEG Placed 4/4, feeds changed to Vital 1.5 given diarrhea.   DVT ppx: on heparin drip  GI ppx: PPI 40 daily  Lines: R dialysis permacath (4/12)  Drips: insulin  Full code  Dispo: MICU 63M PMH HFrEF 10-15% (ischemic), MI, s/p AICD vs PPM, possible Afib, HTN, DM2 on insulin, possible CKD, and gout, who presented with a chief complaint of generalized weakness s/p septic shock likely 2/2 LE cellulitis complicated by ATN requiring dialysis. Now with AMS likely from brainstem infarct and/or toxic metabolic encephalopathy, now with candidemia (resolving) and sepsis 2/2 klebsiella bacteremia. He was intiially treated with meropenem, then switched to zosyn, then finally to ceftriaxone when sensitivities were available. Hydrocortisone was discontinued due to infection. Patient has continued to have low grade temperatures and intermittently required levophed, hydrocortisone restarted on 4/17. Low threshold to remove HD catheter if patient decompensates. Patient also found to have small bowel edema and diarrhea, feeds switched to Vital and metamucil added, C diff ruled out.     NEURO  #Toxic Metabolic Encephalopathy- Likely 2/2 acute infarct on imaging; Patient acutely unresponsive since 3/22 and has had waxing/ waning mental status since. CT, CTA negative. VEEG w/ no seizure activity. Decline in mental status likely 2/2 to underlying infection vs acute neurologic event. 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 Per neurology may have had ischemic event from hypoperfusion. Also showing disc protrusion at C3/C4 level coursing superiorly to the C2/C3 contacting the ventral spinal cord. Per neurology, this may be contributing to weakness, however would not explain change in mental status.  S/p PEG/trach on 4/4. Repeat CT head 4/11 performed due to concern for not moving LUE, showing only chronic infarctions.  - C/w Modafinil  - CT C-spine w/o contrast ordered     ID    #Klebsiella Bacteremia- Urine, blood and sputum cx growing klebsiella. S/p Zosyn 4/14-4/15. S/p Meropenem 4/13. Sensitive to ceftriaxone.  -C/w Ceftriaxone 2g q 24 hrs (4/15-4/26)  -surveillance blood cultures NGTD   -f/u ID recs  -leukocytosis increased to 17 today despite on ABX and off steroids; will remove permacath today for source control    #Candidemia with candida tropicalis- Blood cultures positive for candida tropicalis on 4/7 and again on 4/9. No source identified. CTAP 4/11 negative for abscess. RUKHSANA negative for vegetations/ infection of AICD; Tunelled HD catheter removed 4/8, temp HD placed 4/10, permanent HD cath placed 4/12  - c/w Fluconazole 200mg q 24hrs (4/13-4/24)  - Surveillance fungal cultures 4/10 NGTD  - ID following, f/u recs  - removing permacath today    #UTI- urine culture from 4/11 growing klebsiella sensitive to ceftriaxone, and now bacteremia with GNR likely 2/2 klebsiella (in urine and lung).  -c/w management as per above    CARDIOVASCULAR   # HYPOTENSION-now off levophed  - c/w Midodrine 15 mg q8h to maintain SBP>65  - c/w hydrocortisone 25mg BID    # CHF exacerbation- CHF with EF 10-15% per pt 2/2 ischemic cardiomyopathy s/p AICD. Elevated BNP on admission with R sided effusion and edema. Patient with persistent pleural effusions on CXR and US and b/l dependent edema.   - Maintain negative fluid balance with dialysis  - c/w holding ACE/BB in setting of sepsis/ hypotension on midodrine    #AFIB- hx of Afib and LV thrombus on coumadin prior to admission; TTE with definity shows no LV thrombus currently   - c/w Heparin gtt (holding for IR procedure)  - c/w holding Metoprolol 12.5 q12h given hypotension. Will use Dig for rate control if RVR  - HR 90s-100s; goal <110    #CAD- Hx of MI and ischemic CM s/p AICD, STEMI 7/2017, was started on Aspirin and Brilinta per records from [facility]  -aspirin 81  -Atorvastatin 80mg   -on hep gtt (holding for IR procedure)    #LE Edema   - LE edema with chronic venous stasis  - Duplex does not show DVT    PULMONARY   #Chronic Resp failure- S/p tracheostomy 4/5. Bedside US with simple b/l pleural effusions and atelectatic lung. Less concern for infectious source given b/l effusions with no septations/loculations. Likely related to fluid overload. If clinically worsening, can consider thoracentesis for fluid studies and cultures.  - c/w daily CPAP trials, weaning to trach collar as tolerated  - c/w HD to remove excess fluid    RENAL   #Chronic renal failure- likely ischemic ATN in setting of sepsis with low intravascular volume. Unknown baseline Cr presenting with Cr 3.93 with metabolic derangements. Renal team on board, now on intermittent HD. HD catheter removed 4/8 due to fungemia, temporary HD catheter placed 4/10.  Now s/p permacath replacement 4/12. RP US showed medical renal disease.  - Last dialyzed 4/19  - c/w intermittent HD per renal recs    - removing permacath today and exchanging for temp HD cath    #Adrenal Insufficiency: BP improved immediately after hydrocortisone and able to wean off levophed  -c/w hydrocortisone 25 mg BID    #Elevated AG- Fluctuating at low level; lactate negative, glucose has been well controlled, possibly 2/2 uremia in setting of ESRD.   - Monitor BMP    #Hyperkalemia- PT with intermittently elevated K,  no EKG changes.  - Continue telemetry monitoring  - Trend K   - c/w dialysis   - Kayexalate PRN    GI   #PEG in place: C/D/I  -c/w tube feeds (holding for IR procedure)    #Diarrhea likely 2/2 tube feeds, C.diff ruled out  -rectal tube in place    HEME  #Thrombocytopenia- ELVIRA negative but PF4 was positive. Unlikely HIT. Most likely 2/2 zosyn or sepsis.   -resolved  -monitor CBC    #Elevated INR. Unclear etiology.   - Given Vit K and FFP3/12. FFP 3/13, FFP 4/4  - Monitor coags    #Anemia- Likely 2/2 phlebotomy and CKD, no signs of bleeding. Prior studies showed Anemia of chronic disease. s/p 1pRBC on 3/12, 3/16 and 4/4  -monitor CBC    ENDOCRINE   #Diabetes: HbA1C 8.6  - holding feeds for IR procedure, start D10 @ 60 cc/hr  - regular insulin 7units q6  - lantus 36 unit qhs  - MISS  - monitor FSG and adjust as needed     F: No IVF   E: Replete K<4 Mg<2, caution in setting of acute renal failure  N: PEG Placed 4/4, feeds changed to Vital 1.5 given diarrhea.   DVT ppx: on heparin drip (holding for IR procedure)  GI ppx: PPI 40 daily  Lines: R dialysis permacath (4/12) to be removed 4/19  Drips: none  Full code  Dispo: MICU

## 2018-04-20 LAB
ANION GAP SERPL CALC-SCNC: 17 MMOL/L — SIGNIFICANT CHANGE UP (ref 5–17)
APTT BLD: 100.9 SEC — HIGH (ref 27.5–37.4)
APTT BLD: 68.2 SEC — HIGH (ref 27.5–37.4)
APTT BLD: 72.7 SEC — HIGH (ref 27.5–37.4)
APTT BLD: 78.2 SEC — HIGH (ref 27.5–37.4)
BUN SERPL-MCNC: 47 MG/DL — HIGH (ref 7–23)
CALCIUM SERPL-MCNC: 8.9 MG/DL — SIGNIFICANT CHANGE UP (ref 8.4–10.5)
CHLORIDE SERPL-SCNC: 96 MMOL/L — SIGNIFICANT CHANGE UP (ref 96–108)
CO2 SERPL-SCNC: 24 MMOL/L — SIGNIFICANT CHANGE UP (ref 22–31)
CREAT SERPL-MCNC: 2.96 MG/DL — HIGH (ref 0.5–1.3)
GLUCOSE BLDC GLUCOMTR-MCNC: 185 MG/DL — HIGH (ref 70–99)
GLUCOSE BLDC GLUCOMTR-MCNC: 209 MG/DL — HIGH (ref 70–99)
GLUCOSE BLDC GLUCOMTR-MCNC: 259 MG/DL — HIGH (ref 70–99)
GLUCOSE BLDC GLUCOMTR-MCNC: 260 MG/DL — HIGH (ref 70–99)
GLUCOSE SERPL-MCNC: 265 MG/DL — HIGH (ref 70–99)
HCT VFR BLD CALC: 23.5 % — LOW (ref 39–50)
HGB BLD-MCNC: 7.2 G/DL — LOW (ref 13–17)
LYMPHOCYTES # BLD AUTO: 6 % — LOW (ref 13–44)
MAGNESIUM SERPL-MCNC: 2.1 MG/DL — SIGNIFICANT CHANGE UP (ref 1.6–2.6)
MCHC RBC-ENTMCNC: 28.9 PG — SIGNIFICANT CHANGE UP (ref 27–34)
MCHC RBC-ENTMCNC: 30.6 G/DL — LOW (ref 32–36)
MCV RBC AUTO: 94.4 FL — SIGNIFICANT CHANGE UP (ref 80–100)
MONOCYTES NFR BLD AUTO: 6 % — SIGNIFICANT CHANGE UP (ref 2–14)
NEUTROPHILS NFR BLD AUTO: 88 % — HIGH (ref 43–77)
PHOSPHATE SERPL-MCNC: 3.8 MG/DL — SIGNIFICANT CHANGE UP (ref 2.5–4.5)
PLATELET # BLD AUTO: 336 K/UL — SIGNIFICANT CHANGE UP (ref 150–400)
POTASSIUM SERPL-MCNC: 4.3 MMOL/L — SIGNIFICANT CHANGE UP (ref 3.5–5.3)
POTASSIUM SERPL-SCNC: 4.3 MMOL/L — SIGNIFICANT CHANGE UP (ref 3.5–5.3)
RBC # BLD: 2.49 M/UL — LOW (ref 4.2–5.8)
RBC # FLD: 18.4 % — HIGH (ref 10.3–16.9)
SODIUM SERPL-SCNC: 137 MMOL/L — SIGNIFICANT CHANGE UP (ref 135–145)
WBC # BLD: 13.3 K/UL — HIGH (ref 3.8–10.5)
WBC # FLD AUTO: 13.3 K/UL — HIGH (ref 3.8–10.5)

## 2018-04-20 PROCEDURE — 99233 SBSQ HOSP IP/OBS HIGH 50: CPT | Mod: GC

## 2018-04-20 PROCEDURE — 71045 X-RAY EXAM CHEST 1 VIEW: CPT | Mod: 26

## 2018-04-20 RX ORDER — INSULIN LISPRO 100/ML
10 VIAL (ML) SUBCUTANEOUS EVERY 6 HOURS
Qty: 0 | Refills: 0 | Status: DISCONTINUED | OUTPATIENT
Start: 2018-04-20 | End: 2018-04-20

## 2018-04-20 RX ORDER — MODAFINIL 200 MG/1
100 TABLET ORAL
Qty: 0 | Refills: 0 | Status: DISCONTINUED | OUTPATIENT
Start: 2018-04-20 | End: 2018-04-27

## 2018-04-20 RX ORDER — HUMAN INSULIN 100 [IU]/ML
9 INJECTION, SUSPENSION SUBCUTANEOUS ONCE
Qty: 0 | Refills: 0 | Status: COMPLETED | OUTPATIENT
Start: 2018-04-20 | End: 2018-04-20

## 2018-04-20 RX ORDER — HEPARIN SODIUM 5000 [USP'U]/ML
1600 INJECTION INTRAVENOUS; SUBCUTANEOUS
Qty: 25000 | Refills: 0 | Status: DISCONTINUED | OUTPATIENT
Start: 2018-04-20 | End: 2018-04-20

## 2018-04-20 RX ORDER — HEPARIN SODIUM 5000 [USP'U]/ML
1600 INJECTION INTRAVENOUS; SUBCUTANEOUS
Qty: 25000 | Refills: 0 | Status: DISCONTINUED | OUTPATIENT
Start: 2018-04-20 | End: 2018-04-21

## 2018-04-20 RX ORDER — INSULIN HUMAN 100 [IU]/ML
10 INJECTION, SOLUTION SUBCUTANEOUS EVERY 6 HOURS
Qty: 0 | Refills: 0 | Status: DISCONTINUED | OUTPATIENT
Start: 2018-04-20 | End: 2018-04-24

## 2018-04-20 RX ORDER — HEPARIN SODIUM 5000 [USP'U]/ML
1700 INJECTION INTRAVENOUS; SUBCUTANEOUS
Qty: 25000 | Refills: 0 | Status: DISCONTINUED | OUTPATIENT
Start: 2018-04-20 | End: 2018-04-20

## 2018-04-20 RX ORDER — INSULIN GLARGINE 100 [IU]/ML
42 INJECTION, SOLUTION SUBCUTANEOUS AT BEDTIME
Qty: 0 | Refills: 0 | Status: DISCONTINUED | OUTPATIENT
Start: 2018-04-20 | End: 2018-04-24

## 2018-04-20 RX ORDER — INSULIN HUMAN 100 [IU]/ML
INJECTION, SOLUTION SUBCUTANEOUS EVERY 6 HOURS
Qty: 0 | Refills: 0 | Status: DISCONTINUED | OUTPATIENT
Start: 2018-04-20 | End: 2018-04-30

## 2018-04-20 RX ADMIN — INSULIN HUMAN 10 UNIT(S): 100 INJECTION, SOLUTION SUBCUTANEOUS at 23:07

## 2018-04-20 RX ADMIN — PANTOPRAZOLE SODIUM 40 MILLIGRAM(S): 20 TABLET, DELAYED RELEASE ORAL at 12:11

## 2018-04-20 RX ADMIN — Medication 25 MILLIGRAM(S): at 10:00

## 2018-04-20 RX ADMIN — Medication 81 MILLIGRAM(S): at 12:11

## 2018-04-20 RX ADMIN — CHLORHEXIDINE GLUCONATE 15 MILLILITER(S): 213 SOLUTION TOPICAL at 12:12

## 2018-04-20 RX ADMIN — ATORVASTATIN CALCIUM 80 MILLIGRAM(S): 80 TABLET, FILM COATED ORAL at 23:07

## 2018-04-20 RX ADMIN — MODAFINIL 100 MILLIGRAM(S): 200 TABLET ORAL at 07:21

## 2018-04-20 RX ADMIN — INSULIN HUMAN 4: 100 INJECTION, SOLUTION SUBCUTANEOUS at 17:40

## 2018-04-20 RX ADMIN — CHLORHEXIDINE GLUCONATE 1 APPLICATION(S): 213 SOLUTION TOPICAL at 07:19

## 2018-04-20 RX ADMIN — Medication 1 TABLET(S): at 12:12

## 2018-04-20 RX ADMIN — INSULIN GLARGINE 42 UNIT(S): 100 INJECTION, SOLUTION SUBCUTANEOUS at 23:07

## 2018-04-20 RX ADMIN — FLUCONAZOLE 100 MILLIGRAM(S): 150 TABLET ORAL at 23:06

## 2018-04-20 RX ADMIN — HUMAN INSULIN 9 UNIT(S): 100 INJECTION, SUSPENSION SUBCUTANEOUS at 15:25

## 2018-04-20 RX ADMIN — INSULIN HUMAN 10 UNIT(S): 100 INJECTION, SOLUTION SUBCUTANEOUS at 17:40

## 2018-04-20 RX ADMIN — ERGOCALCIFEROL 6000 UNIT(S): 1.25 CAPSULE ORAL at 12:10

## 2018-04-20 RX ADMIN — CHLORHEXIDINE GLUCONATE 15 MILLILITER(S): 213 SOLUTION TOPICAL at 22:45

## 2018-04-20 RX ADMIN — MODAFINIL 100 MILLIGRAM(S): 200 TABLET ORAL at 20:15

## 2018-04-20 RX ADMIN — INSULIN HUMAN 7 UNIT(S): 100 INJECTION, SOLUTION SUBCUTANEOUS at 07:17

## 2018-04-20 RX ADMIN — INSULIN HUMAN 2: 100 INJECTION, SOLUTION SUBCUTANEOUS at 23:07

## 2018-04-20 RX ADMIN — Medication 1 MILLIGRAM(S): at 12:11

## 2018-04-20 RX ADMIN — INSULIN HUMAN 10 UNIT(S): 100 INJECTION, SOLUTION SUBCUTANEOUS at 12:10

## 2018-04-20 RX ADMIN — Medication 1 PACKET(S): at 15:25

## 2018-04-20 RX ADMIN — Medication 25 MILLIGRAM(S): at 22:46

## 2018-04-20 RX ADMIN — HEPARIN SODIUM 17 UNIT(S)/HR: 5000 INJECTION INTRAVENOUS; SUBCUTANEOUS at 08:41

## 2018-04-20 RX ADMIN — ESCITALOPRAM OXALATE 5 MILLIGRAM(S): 10 TABLET, FILM COATED ORAL at 12:11

## 2018-04-20 RX ADMIN — CEFTRIAXONE 2000 MILLIGRAM(S): 500 INJECTION, POWDER, FOR SOLUTION INTRAMUSCULAR; INTRAVENOUS at 17:39

## 2018-04-20 RX ADMIN — Medication 100 MILLIGRAM(S): at 12:11

## 2018-04-20 RX ADMIN — INSULIN HUMAN 6: 100 INJECTION, SOLUTION SUBCUTANEOUS at 07:17

## 2018-04-20 RX ADMIN — INSULIN HUMAN 6: 100 INJECTION, SOLUTION SUBCUTANEOUS at 12:09

## 2018-04-20 NOTE — PROGRESS NOTE ADULT - SUBJECTIVE AND OBJECTIVE BOX
Patient seen and examined at bedside.  Patient is a 63 year old male with a history of HFrEF 10-15% due to ischemic cardiomyopathy, s/p AICD, ?atrial fibrillation, hypertension, diabetes mellitus type 2, gout, and CKD for whom nephrology was called for GILMER from ATN requiring hemodialysis. Patient was last dialyzed 4/19. Patient appears clinically unchanged.     acetaminophen    Suspension 650 milliGRAM(s) every 6 hours PRN  acetaminophen    Suspension. 650 milliGRAM(s) every 6 hours PRN  aspirin  chewable 81 milliGRAM(s) daily  atorvastatin 80 milliGRAM(s) at bedtime  cefTRIAXone Injectable. 2000 milliGRAM(s) every 24 hours  chlorhexidine 0.12% Liquid 15 milliLiter(s) two times a day  chlorhexidine 2% Cloths 1 Application(s) daily  dextrose 5%. 1000 milliLiter(s) <Continuous>  dextrose 50% Injectable 12.5 Gram(s) once  dextrose 50% Injectable 25 Gram(s) once  dextrose 50% Injectable 25 Gram(s) once  dextrose Gel 1 Dose(s) once PRN  ergocalciferol Drops 6000 Unit(s) daily  escitalopram 5 milliGRAM(s) daily  fluconAZOLE IVPB 200 milliGRAM(s) every 24 hours  folic acid 1 milliGRAM(s) daily  glucagon  Injectable 1 milliGRAM(s) once PRN  heparin  Infusion 1700 Unit(s)/Hr <Continuous>  hydrocortisone sodium succinate Injectable 25 milliGRAM(s) every 12 hours  insulin glargine Injectable (LANTUS) 42 Unit(s) at bedtime  insulin lispro Injectable (HumaLOG) 10 Unit(s) every 6 hours  insulin regular  human corrective regimen sliding scale   every 6 hours  modafinil 100 milliGRAM(s) <User Schedule>  multivitamin 1 Tablet(s) daily  pantoprazole   Suspension 40 milliGRAM(s) daily  psyllium Powder 1 Packet(s) daily  thiamine 100 milliGRAM(s) daily    Allergies    No Known Allergies    Intolerances    T(C): , Max: 37.2 (04-19-18 @ 21:59)  T(F): , Max: 98.9 (04-19-18 @ 21:59)  HR: 100 (04-20-18 @ 10:00)  BP: 96/66 (04-20-18 @ 10:00)  BP(mean): 77 (04-20-18 @ 10:00)  RR: 22 (04-20-18 @ 10:00)  SpO2: 100% (04-20-18 @ 10:00)    04-19 @ 07:01  -  04-20 @ 07:00  --------------------------------------------------------  IN:    dextrose 10%: 300 mL    Enteral Tube Flush: 100 mL    heparin Infusion: 51 mL    heparin Infusion: 238 mL    Oral Fluid: 70 mL    Solution: 300 mL    Vital HN: 1389 mL  Total IN: 2448 mL    OUT:    Other: 1200 mL  Total OUT: 1200 mL    Total NET: 1248 mL    04-20 @ 07:01  -  04-20 @ 10:44  --------------------------------------------------------  IN:    heparin Infusion: 34 mL    Vital HN: 63 mL  Total IN: 97 mL    OUT:  Total OUT: 0 mL    Total NET: 97 mL    LABS:                        7.2    13.3  )-----------( 336      ( 20 Apr 2018 05:53 )             23.5     04-20    137  |  96  |  47<H>  ----------------------------<  265<H>  4.3   |  24  |  2.96<H>    Ca    8.9      20 Apr 2018 05:53  Phos  3.8     04-20  Mg     2.1     04-20    PTT - ( 20 Apr 2018 05:53 )  PTT:68.2 sec

## 2018-04-20 NOTE — PROGRESS NOTE ADULT - ASSESSMENT
63M PMH HFrEF 10-15% (ischemic), MI, s/p AICD vs PPM, possible Afib, HTN, DM2 on insulin, possible CKD, and gout, who presented with a chief complaint of generalized weakness s/p septic shock likely 2/2 LE cellulitis complicated by ATN requiring dialysis. Now with AMS likely from brainstem infarct and/or toxic metabolic encephalopathy, now with candidemia (resolving) and sepsis 2/2 klebsiella bacteremia. He was intiially treated with meropenem, then switched to zosyn, then finally to ceftriaxone when sensitivities were available. Hydrocortisone was discontinued due to infection. Patient has continued to have low grade temperatures and intermittently required levophed, hydrocortisone restarted on 4/17. Low threshold to remove HD catheter if patient decompensates. Patient also found to have small bowel edema and diarrhea, feeds switched to Vital and metamucil added, C diff ruled out.     NEURO  #Toxic Metabolic Encephalopathy- Likely 2/2 acute infarct on imaging; Patient acutely unresponsive since 3/22 and has had waxing/ waning mental status since. CT, CTA negative. VEEG w/ no seizure activity. Decline in mental status likely 2/2 to underlying infection vs acute neurologic event. 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 Per neurology may have had ischemic event from hypoperfusion. Also showing disc protrusion at C3/C4 level coursing superiorly to the C2/C3 contacting the ventral spinal cord. Per neurology, this may be contributing to weakness, however would not explain change in mental status.  S/p PEG/trach on 4/4. Repeat CT head 4/11 performed due to concern for not moving LUE, showing only chronic infarctions.  - C/w Modafinil  - CT C-spine w/o contrast ordered     ID    #Klebsiella Bacteremia- Urine, blood and sputum cx growing klebsiella. S/p Zosyn 4/14-4/15. S/p Meropenem 4/13. Sensitive to ceftriaxone.  -C/w Ceftriaxone 2g q 24 hrs (4/15-4/26)  -surveillance blood cultures NGTD   -f/u ID recs  -leukocytosis increased to 17 today despite on ABX and off steroids; will remove permacath today for source control    #Candidemia with candida tropicalis- Blood cultures positive for candida tropicalis on 4/7 and again on 4/9. No source identified. CTAP 4/11 negative for abscess. RUKHSANA negative for vegetations/ infection of AICD; Tunelled HD catheter removed 4/8, temp HD placed 4/10, permanent HD cath placed 4/12  - c/w Fluconazole 200mg q 24hrs (4/13-4/24)  - Surveillance fungal cultures 4/10 NGTD  - ID following, f/u recs  - removing permacath today    #UTI- urine culture from 4/11 growing klebsiella sensitive to ceftriaxone, and now bacteremia with GNR likely 2/2 klebsiella (in urine and lung).  -c/w management as per above    CARDIOVASCULAR   # HYPOTENSION-now off levophed  - c/w Midodrine 15 mg q8h to maintain SBP>65  - c/w hydrocortisone 25mg BID    # CHF exacerbation- CHF with EF 10-15% per pt 2/2 ischemic cardiomyopathy s/p AICD. Elevated BNP on admission with R sided effusion and edema. Patient with persistent pleural effusions on CXR and US and b/l dependent edema.   - Maintain negative fluid balance with dialysis  - c/w holding ACE/BB in setting of sepsis/ hypotension on midodrine    #AFIB- hx of Afib and LV thrombus on coumadin prior to admission; TTE with definity shows no LV thrombus currently   - c/w Heparin gtt (holding for IR procedure)  - c/w holding Metoprolol 12.5 q12h given hypotension. Will use Dig for rate control if RVR  - HR 90s-100s; goal <110    #CAD- Hx of MI and ischemic CM s/p AICD, STEMI 7/2017, was started on Aspirin and Brilinta per records from [place]  -aspirin 81  -Atorvastatin 80mg   -on hep gtt (holding for IR procedure)    #LE Edema   - LE edema with chronic venous stasis  - Duplex does not show DVT    PULMONARY   #Chronic Resp failure- S/p tracheostomy 4/5. Bedside US with simple b/l pleural effusions and atelectatic lung. Less concern for infectious source given b/l effusions with no septations/loculations. Likely related to fluid overload. If clinically worsening, can consider thoracentesis for fluid studies and cultures.  - c/w daily CPAP trials, weaning to trach collar as tolerated  - c/w HD to remove excess fluid    RENAL   #Chronic renal failure- likely ischemic ATN in setting of sepsis with low intravascular volume. Unknown baseline Cr presenting with Cr 3.93 with metabolic derangements. Renal team on board, now on intermittent HD. HD catheter removed 4/8 due to fungemia, temporary HD catheter placed 4/10.  Now s/p permacath replacement 4/12. RP US showed medical renal disease.  - Last dialyzed 4/19  - c/w intermittent HD per renal recs    - removing permacath today and exchanging for temp HD cath    #Adrenal Insufficiency: BP improved immediately after hydrocortisone and able to wean off levophed  -c/w hydrocortisone 25 mg BID    #Elevated AG- Fluctuating at low level; lactate negative, glucose has been well controlled, possibly 2/2 uremia in setting of ESRD.   - Monitor BMP    #Hyperkalemia- PT with intermittently elevated K,  no EKG changes.  - Continue telemetry monitoring  - Trend K   - c/w dialysis   - Kayexalate PRN    GI   #PEG in place: C/D/I  -c/w tube feeds (holding for IR procedure)    #Diarrhea likely 2/2 tube feeds, C.diff ruled out  -rectal tube in place    HEME  #Thrombocytopenia- ELVIRA negative but PF4 was positive. Unlikely HIT. Most likely 2/2 zosyn or sepsis.   -resolved  -monitor CBC    #Elevated INR. Unclear etiology.   - Given Vit K and FFP3/12. FFP 3/13, FFP 4/4  - Monitor coags    #Anemia- Likely 2/2 phlebotomy and CKD, no signs of bleeding. Prior studies showed Anemia of chronic disease. s/p 1pRBC on 3/12, 3/16 and 4/4  -monitor CBC    ENDOCRINE   #Diabetes: HbA1C 8.6  - holding feeds for IR procedure, start D10 @ 60 cc/hr  - regular insulin 7units q6  - lantus 36 unit qhs  - MISS  - monitor FSG and adjust as needed     F: No IVF   E: Replete K<4 Mg<2, caution in setting of acute renal failure  N: PEG Placed 4/4, feeds changed to Vital 1.5 given diarrhea.   DVT ppx: on heparin drip (holding for IR procedure)  GI ppx: PPI 40 daily  Lines: R dialysis permacath (4/12) to be removed 4/19  Drips: none  Full code  Dispo: MICU 63M PMH HFrEF 10-15% (ischemic), MI, s/p AICD vs PPM, possible Afib, HTN, DM2 on insulin, possible CKD, and gout, who presented with a chief complaint of generalized weakness s/p septic shock likely 2/2 LE cellulitis complicated by ATN requiring dialysis. Now with AMS likely from brainstem infarct and/or toxic metabolic encephalopathy, now with candidemia (resolving) and sepsis 2/2 klebsiella bacteremia. He was intiially treated with meropenem, then switched to zosyn, then finally to ceftriaxone when sensitivities were available. Hydrocortisone was discontinued due to infection. Patient has continued to have low grade temperatures and intermittently required levophed, hydrocortisone restarted on 4/17. Low threshold to remove HD catheter if patient decompensates. Patient also found to have small bowel edema and diarrhea, feeds switched to Vital and metamucil added, C diff ruled out.     NEURO  #Toxic Metabolic Encephalopathy- Likely 2/2 acute infarct on imaging; Patient acutely unresponsive since 3/22 and has had waxing/ waning mental status since. CT, CTA negative. VEEG w/ no seizure activity. Decline in mental status likely 2/2 to underlying infection vs acute neurologic event. 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 Per neurology may have had ischemic event from hypoperfusion. Also showing disc protrusion at C3/C4 level coursing superiorly to the C2/C3 contacting the ventral spinal cord. Per neurology, this may be contributing to weakness, however would not explain change in mental status.  S/p PEG/trach on 4/4. Repeat CT head 4/11 performed due to concern for not moving LUE, showing only chronic infarctions.  - C/w Modafinil  - CT C-spine w/o contrast showing multilevel degenerative changes with mod-severe foraminal narrowing @ C3-C4  - per neuro, no acute interventions     ID    #Klebsiella Bacteremia- Urine, blood and sputum cx growing klebsiella. S/p Zosyn 4/14-4/15. S/p Meropenem 4/13. Sensitive to ceftriaxone.  -C/w Ceftriaxone 2g q 24 hrs (4/15-4/26)  -surveillance blood cultures NGTD   -f/u ID recs  -s/p permacath removal 4/19 with leukocytosis downtrending, afebrile     #Candidemia with candida tropicalis- Blood cultures positive for candida tropicalis on 4/7 and again on 4/9. No source identified. CTAP 4/11 negative for abscess. RUKHSANA negative for vegetations/ infection of AICD; Tunelled HD catheter removed 4/8, temp HD placed 4/10, permanent HD cath placed 4/12  - c/w Fluconazole 200mg q 24hrs (4/13-4/24)  - Surveillance fungal cultures 4/10 NGTD  - ID following, f/u recs  - permacath removed 4/19    #UTI- urine culture from 4/11 growing klebsiella sensitive to ceftriaxone, and now bacteremia with GNR likely 2/2 klebsiella (in urine and lung).  -c/w management as per above    CARDIOVASCULAR   # HYPOTENSION-now off levophed  - c/w Midodrine 15 mg q8h to maintain SBP>65  - c/w hydrocortisone 25mg BID    # CHF exacerbation- CHF with EF 10-15% per pt 2/2 ischemic cardiomyopathy s/p AICD. Elevated BNP on admission with R sided effusion and edema. Patient with persistent pleural effusions on CXR and US and b/l dependent edema.   - Maintain negative fluid balance with dialysis  - c/w holding ACE/BB in setting of sepsis/ hypotension on midodrine    #AFIB- hx of Afib and LV thrombus on coumadin prior to admission; TTE with definity shows no LV thrombus currently   - c/w Heparin gtt   - c/w holding Metoprolol 12.5 q12h given hypotension. Will use Dig for rate control if RVR  - HR 90s-100s; goal <110    #CAD- Hx of MI and ischemic CM s/p AICD, STEMI 7/2017, was started on Aspirin and Brilinta per records from [place]  -aspirin 81  -Atorvastatin 80mg   -on hep gtt    #LE Edema   - LE edema with chronic venous stasis  - Duplex does not show DVT    PULMONARY   #Chronic Resp failure- S/p tracheostomy 4/5. Bedside US with simple b/l pleural effusions and atelectatic lung. Less concern for infectious source given b/l effusions with no septations/loculations. Likely related to fluid overload. If clinically worsening, can consider thoracentesis for fluid studies and cultures.  - c/w daily CPAP trials, weaning to trach collar as tolerated  - c/w HD to remove excess fluid    RENAL   #Chronic renal failure- likely ischemic ATN in setting of sepsis with low intravascular volume. Unknown baseline Cr presenting with Cr 3.93 with metabolic derangements. Renal team on board, now on intermittent HD. HD catheter removed 4/8 due to fungemia, temporary HD catheter placed 4/10.  Now s/p permacath replacement 4/12. RP US showed medical renal disease.  - Last dialyzed 4/19  - c/w intermittent HD per renal recs - plan for next session 4/21   - permacath removed 4/19 and replaced with L subclavian HD cath    #Adrenal Insufficiency: BP improved immediately after hydrocortisone and able to wean off levophed  -c/w hydrocortisone 25 mg BID    #Elevated AG- Fluctuating at low level; lactate negative, glucose has been well controlled, possibly 2/2 uremia in setting of ESRD.   - Monitor BMP    #Hyperkalemia- PT with intermittently elevated K,  no EKG changes.  - Continue telemetry monitoring  - Trend K   - c/w dialysis   - Kayexalate PRN    GI   #PEG in place: C/D/I  -c/w tube feeds (holding for IR procedure)    #Diarrhea likely 2/2 tube feeds, C.diff ruled out  -rectal tube in place    HEME  #Thrombocytopenia- ELVIRA negative but PF4 was positive. Unlikely HIT. Most likely 2/2 zosyn or sepsis.   -resolved  -monitor CBC    #Elevated INR. Unclear etiology.   - Given Vit K and FFP3/12. FFP 3/13, FFP 4/4  - Monitor coags    #Anemia- Likely 2/2 phlebotomy and CKD, no signs of bleeding. Prior studies showed Anemia of chronic disease. s/p 1pRBC on 3/12, 3/16 and 4/4  -monitor CBC    ENDOCRINE   #Diabetes: HbA1C 8.6  - feeds resumed  - regular insulin 10 units q6  - lantus 45 unit qhs  - MISS  - monitor FSG and adjust as needed     F: No IVF   E: Replete K<4 Mg<2, caution in setting of acute renal failure  N: PEG Placed 4/4, feeds changed to Vital 1.5 given diarrhea.   DVT ppx: on heparin drip  GI ppx: PPI 40 daily  Lines: L subclavian HD cath placed 4/19  Drips: none  Full code  Dispo: MICU 63M PMH HFrEF 10-15% (ischemic), MI, s/p AICD vs PPM, possible Afib, HTN, DM2 on insulin, possible CKD, and gout, who presented with a chief complaint of generalized weakness s/p septic shock likely 2/2 LE cellulitis complicated by ATN requiring dialysis. Now with AMS likely from brainstem infarct and/or toxic metabolic encephalopathy, now with candidemia (resolving) and sepsis 2/2 klebsiella bacteremia. He was intiially treated with meropenem, then switched to zosyn, then finally to ceftriaxone when sensitivities were available. Hydrocortisone was discontinued due to infection. Patient has continued to have low grade temperatures and intermittently required levophed, hydrocortisone restarted on 4/17.    NEURO  #Toxic Metabolic Encephalopathy- Likely 2/2 acute infarct on imaging; Patient acutely unresponsive since 3/22 and has had waxing/ waning mental status since. CT, CTA negative. VEEG w/ no seizure activity. Decline in mental status likely 2/2 to underlying infection vs acute neurologic event. 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 Per neurology may have had ischemic event from hypoperfusion. Also showing disc protrusion at C3/C4 level coursing superiorly to the C2/C3 contacting the ventral spinal cord. Per neurology, this may be contributing to weakness, however would not explain change in mental status.  S/p PEG/trach on 4/4. Repeat CT head 4/11 performed due to concern for not moving LUE, showing only chronic infarctions.  - C/w Modafinil  - CT C-spine w/o contrast showing multilevel degenerative changes with mod-severe foraminal narrowing @ C3-C4  - per neuro, no acute interventions     ID    #Klebsiella Bacteremia- Urine, blood and sputum cx growing klebsiella. S/p Zosyn 4/14-4/15. S/p Meropenem 4/13. Sensitive to ceftriaxone.  -C/w Ceftriaxone 2g q 24 hrs (4/15-4/26)  -surveillance blood cultures NGTD   -f/u ID recs  -s/p permacath removal 4/19 with leukocytosis downtrending, afebrile     #Candidemia with candida tropicalis- Blood cultures positive for candida tropicalis on 4/7 and again on 4/9. No source identified. CTAP 4/11 negative for abscess. RUKHSANA negative for vegetations/ infection of AICD; Tunelled HD catheter removed 4/8, temp HD placed 4/10, permanent HD cath placed 4/12  - c/w Fluconazole 200mg q 24hrs (4/13-4/24)  - Surveillance fungal cultures 4/10 NGTD  - ID following, f/u recs  - permacath removed 4/19    #UTI- urine culture from 4/11 growing klebsiella sensitive to ceftriaxone, and now bacteremia with GNR likely 2/2 klebsiella (in urine and lung).  -c/w management as per above    CARDIOVASCULAR   # HYPOTENSION-now off levophed  - c/w Midodrine 15 mg q8h to maintain SBP>65  - c/w hydrocortisone 25mg BID    # CHF exacerbation- CHF with EF 10-15% per pt 2/2 ischemic cardiomyopathy s/p AICD. Elevated BNP on admission with R sided effusion and edema. Patient with persistent pleural effusions on CXR and US and b/l dependent edema.   - Maintain negative fluid balance with dialysis  - c/w holding ACE/BB in setting of sepsis/ hypotension on midodrine    #AFIB- hx of Afib and LV thrombus on coumadin prior to admission; TTE with definity shows no LV thrombus currently   - c/w Heparin gtt   - c/w holding Metoprolol 12.5 q12h given hypotension. Will use Dig for rate control if RVR  - HR 90s-100s; goal <110    #CAD- Hx of MI and ischemic CM s/p AICD, STEMI 7/2017, was started on Aspirin and Brilinta per records from [place]  -aspirin 81  -Atorvastatin 80mg   -on hep gtt    #LE Edema   - LE edema with chronic venous stasis  - Duplex does not show DVT    PULMONARY   #Chronic Resp failure- S/p tracheostomy 4/5. Bedside US with simple b/l pleural effusions and atelectatic lung. Less concern for infectious source given b/l effusions with no septations/loculations. Likely related to fluid overload. If clinically worsening, can consider thoracentesis for fluid studies and cultures.  - c/w daily CPAP trials, weaning to trach collar as tolerated  - c/w HD to remove excess fluid    RENAL   #Chronic renal failure- likely ischemic ATN in setting of sepsis with low intravascular volume. Unknown baseline Cr presenting with Cr 3.93 with metabolic derangements. Renal team on board, now on intermittent HD. HD catheter removed 4/8 due to fungemia, temporary HD catheter placed 4/10.  Now s/p permacath replacement 4/12. RP US showed medical renal disease.  - Last dialyzed 4/19  - c/w intermittent HD per renal recs - plan for next session 4/21   - permacath removed 4/19 and replaced with L subclavian HD cath    #Adrenal Insufficiency: BP improved immediately after hydrocortisone and able to wean off levophed  -c/w hydrocortisone 25 mg BID given marginal response to cortrysyn stimulation test    #Elevated AG- Fluctuating at low level; lactate negative, glucose has been well controlled, possibly 2/2 uremia in setting of ESRD.   - Monitor BMP    #Hyperkalemia- PT with intermittently elevated K,  no EKG changes.  - Continue telemetry monitoring  - Trend K   - c/w dialysis   - Kayexalate PRN    GI   #PEG in place: C/D/I  -c/w tube feeds (holding for IR procedure)    #Diarrhea likely 2/2 tube feeds, C.diff ruled out  -rectal tube in place    HEME  #Thrombocytopenia- ELVIRA negative but PF4 was positive. Unlikely HIT. Most likely 2/2 zosyn or sepsis.   -resolved  -monitor CBC    #Elevated INR. Unclear etiology.   - Given Vit K and FFP3/12. FFP 3/13, FFP 4/4  - Monitor coags    #Anemia- Likely 2/2 phlebotomy and CKD, no signs of bleeding. Prior studies showed Anemia of chronic disease. s/p 1pRBC on 3/12, 3/16 and 4/4  -monitor CBC    ENDOCRINE   #Diabetes: HbA1C 8.6  - feeds resumed  - regular insulin 10 units q6  - lantus 45 unit qhs  - MISS  - monitor FSG and adjust as needed     F: No IVF   E: Replete K<4 Mg<2, caution in setting of acute renal failure  N: PEG Placed 4/4, feeds changed to Vital 1.5 given diarrhea.   DVT ppx: on heparin drip  GI ppx: PPI 40 daily  Lines: L subclavian HD cath placed 4/19  Drips: none  Full code  Dispo: MICU

## 2018-04-20 NOTE — PROGRESS NOTE ADULT - PROBLEM SELECTOR PLAN 1
Patient is a 63  year old male with acute on chronic renal failure from ischemic ATN. Patient is currently requiring hemodialysis. Patient was last dialyzed 4/19     P - Patient does not require emergent dialysis today as volume status is acceptable   Next dialysis 4/21  Please check a bladder scan today

## 2018-04-20 NOTE — PROGRESS NOTE ADULT - SUBJECTIVE AND OBJECTIVE BOX
INTERVAL HPI/OVERNIGHT EVENTS:    SUBJECTIVE: Patient seen and examined at bedside.     CONSTITUTIONAL: No weakness, fevers or chills  EYES/ENT: No visual changes;  No vertigo or throat pain   NECK: No pain or stiffness  RESPIRATORY: No cough, wheezing, hemoptysis; No shortness of breath  CARDIOVASCULAR: No chest pain or palpitations  GASTROINTESTINAL: No abdominal or epigastric pain. No nausea, vomiting, or hematemesis; No diarrhea or constipation. No melena or hematochezia.  GENITOURINARY: No dysuria, frequency or hematuria  NEUROLOGICAL: No numbness or weakness  SKIN: No itching, rashes    OBJECTIVE:    VITAL SIGNS:  ICU Vital Signs Last 24 Hrs  T(C): 36.7 (20 Apr 2018 01:22), Max: 37.2 (19 Apr 2018 21:59)  T(F): 98 (20 Apr 2018 01:22), Max: 98.9 (19 Apr 2018 21:59)  HR: 86 (20 Apr 2018 05:00) (84 - 116)  BP: 85/58 (20 Apr 2018 05:00) (76/53 - 106/74)  BP(mean): 67 (20 Apr 2018 05:00) (59 - 86)  ABP: --  ABP(mean): --  RR: 15 (20 Apr 2018 05:00) (10 - 33)  SpO2: 100% (20 Apr 2018 05:00) (97% - 100%)    Mode: AC/ CMV (Assist Control/ Continuous Mandatory Ventilation), RR (machine): 10, TV (machine): 500, FiO2: 40, PEEP: 5, ITime: 1, MAP: 8, PIP: 16    04-18 @ 07:01 - 04-19 @ 07:00  --------------------------------------------------------  IN: 2294.8 mL / OUT: 100 mL / NET: 2194.8 mL    04-19 @ 07:01  -  04-20 @ 06:20  --------------------------------------------------------  IN: 2174 mL / OUT: 1200 mL / NET: 974 mL      CAPILLARY BLOOD GLUCOSE      POCT Blood Glucose.: 275 mg/dL (19 Apr 2018 23:08)      PHYSICAL EXAM:    General: NAD  HEENT: NC/AT; PERRL, clear conjunctiva  Neck: supple  Respiratory: CTA b/l  Cardiovascular: +S1/S2; RRR  Abdomen: soft, NT/ND; +BS x4  Extremities: WWP, 2+ peripheral pulses b/l; no LE edema  Skin: normal color and turgor; no rash  Neurological:    MEDICATIONS:  MEDICATIONS  (STANDING):  aspirin  chewable 81 milliGRAM(s) Oral daily  atorvastatin 80 milliGRAM(s) Oral at bedtime  cefTRIAXone Injectable. 2000 milliGRAM(s) IV Push every 24 hours  chlorhexidine 0.12% Liquid 15 milliLiter(s) Swish and Spit two times a day  chlorhexidine 2% Cloths 1 Application(s) Topical daily  dextrose 5%. 1000 milliLiter(s) (50 mL/Hr) IV Continuous <Continuous>  dextrose 50% Injectable 12.5 Gram(s) IV Push once  dextrose 50% Injectable 25 Gram(s) IV Push once  dextrose 50% Injectable 25 Gram(s) IV Push once  ergocalciferol Drops 6000 Unit(s) Oral daily  escitalopram 5 milliGRAM(s) Oral daily  fluconAZOLE IVPB 200 milliGRAM(s) IV Intermittent every 24 hours  folic acid 1 milliGRAM(s) Oral daily  heparin  Infusion 1700 Unit(s)/Hr (17 mL/Hr) IV Continuous <Continuous>  hydrocortisone sodium succinate Injectable 25 milliGRAM(s) IV Push every 12 hours  insulin glargine Injectable (LANTUS) 36 Unit(s) SubCutaneous at bedtime  insulin regular  human corrective regimen sliding scale   SubCutaneous every 6 hours  insulin regular  human recombinant 7 Unit(s) SubCutaneous every 6 hours  modafinil 100 milliGRAM(s) Oral <User Schedule>  multivitamin 1 Tablet(s) Oral daily  pantoprazole   Suspension 40 milliGRAM(s) Oral daily  psyllium Powder 1 Packet(s) Oral daily  thiamine 100 milliGRAM(s) Oral daily    MEDICATIONS  (PRN):  acetaminophen    Suspension 650 milliGRAM(s) Oral every 6 hours PRN For Temp greater than 38 C (100.4 F)  acetaminophen    Suspension. 650 milliGRAM(s) Oral every 6 hours PRN Moderate Pain (4 - 6)  dextrose Gel 1 Dose(s) Oral once PRN Blood Glucose LESS THAN 70 milliGRAM(s)/deciliter  glucagon  Injectable 1 milliGRAM(s) IntraMuscular once PRN Glucose LESS THAN 70 milligrams/deciliter      ALLERGIES:  Allergies    No Known Allergies    Intolerances        LABS:                        8.1    17.6  )-----------( 416      ( 19 Apr 2018 06:16 )             26.4     04-19    139  |  96  |  64<H>  ----------------------------<  232<H>  4.7   |  25  |  4.19<H>    Ca    9.3      19 Apr 2018 06:16  Phos  4.2     04-19  Mg     2.0     04-19      PTT - ( 20 Apr 2018 00:33 )  PTT:72.7 sec      RADIOLOGY & ADDITIONAL TESTS: Reviewed. INTERVAL HPI/OVERNIGHT EVENTS: pt required additional 6 units regular insulin coverage for     SUBJECTIVE: Patient seen and examined at bedside. NAD. No respiratory distress on pressure support. Pt continues to be nonverbal but responsive to tactile and verbal stimuli. Unable to assess ROS.     OBJECTIVE:    VITAL SIGNS:  ICU Vital Signs Last 24 Hrs  T(C): 36.7 (20 Apr 2018 01:22), Max: 37.2 (19 Apr 2018 21:59)  T(F): 98 (20 Apr 2018 01:22), Max: 98.9 (19 Apr 2018 21:59)  HR: 86 (20 Apr 2018 05:00) (84 - 116)  BP: 85/58 (20 Apr 2018 05:00) (76/53 - 106/74)  BP(mean): 67 (20 Apr 2018 05:00) (59 - 86)  ABP: --  ABP(mean): --  RR: 15 (20 Apr 2018 05:00) (10 - 33)  SpO2: 100% (20 Apr 2018 05:00) (97% - 100%)    Mode: AC/ CMV (Assist Control/ Continuous Mandatory Ventilation), RR (machine): 10, TV (machine): 500, FiO2: 40, PEEP: 5, ITime: 1, MAP: 8, PIP: 16    04-18 @ 07:01  -  04-19 @ 07:00  --------------------------------------------------------  IN: 2294.8 mL / OUT: 100 mL / NET: 2194.8 mL    04-19 @ 07:01  -  04-20 @ 06:20  --------------------------------------------------------  IN: 2174 mL / OUT: 1200 mL / NET: 974 mL      CAPILLARY BLOOD GLUCOSE      POCT Blood Glucose.: 275 mg/dL (19 Apr 2018 23:08)      PHYSICAL EXAM:    General: NAD, no respiratory distress on pressure support, cachectic, chronically ill-appearing   HEENT: NC/AT; PERRL, clear conjunctiva, MM dry  Neck: supple; trach in place  Respiratory: rhonchi b/l, upper airway secretions, no wheezing or rales, no retractions or labored breathing  Cardiovascular: +S1/S2; RRR  Abdomen: soft, mildly distended and tympanic to percussion, nontender, +BS, PEG in place C/D/I  Extremities: WWP, 2+ peripheral pulses b/l; 1+ pitting edema to level of thigh b/l, however decreased from yesterday  Skin: dry, b/l LE with peeling dry skin, wrinkled skin of b/l feet  Neurological: nonverbal, not following commands, RUE flexed and rigid with tremor, LUE rigid but w/o spontaneous movement, no spontaneous movement of b/l LE, more alert and awake today    MEDICATIONS:  MEDICATIONS  (STANDING):  aspirin  chewable 81 milliGRAM(s) Oral daily  atorvastatin 80 milliGRAM(s) Oral at bedtime  cefTRIAXone Injectable. 2000 milliGRAM(s) IV Push every 24 hours  chlorhexidine 0.12% Liquid 15 milliLiter(s) Swish and Spit two times a day  chlorhexidine 2% Cloths 1 Application(s) Topical daily  dextrose 5%. 1000 milliLiter(s) (50 mL/Hr) IV Continuous <Continuous>  dextrose 50% Injectable 12.5 Gram(s) IV Push once  dextrose 50% Injectable 25 Gram(s) IV Push once  dextrose 50% Injectable 25 Gram(s) IV Push once  ergocalciferol Drops 6000 Unit(s) Oral daily  escitalopram 5 milliGRAM(s) Oral daily  fluconAZOLE IVPB 200 milliGRAM(s) IV Intermittent every 24 hours  folic acid 1 milliGRAM(s) Oral daily  heparin  Infusion 1700 Unit(s)/Hr (17 mL/Hr) IV Continuous <Continuous>  hydrocortisone sodium succinate Injectable 25 milliGRAM(s) IV Push every 12 hours  insulin glargine Injectable (LANTUS) 36 Unit(s) SubCutaneous at bedtime  insulin regular  human corrective regimen sliding scale   SubCutaneous every 6 hours  insulin regular  human recombinant 7 Unit(s) SubCutaneous every 6 hours  modafinil 100 milliGRAM(s) Oral <User Schedule>  multivitamin 1 Tablet(s) Oral daily  pantoprazole   Suspension 40 milliGRAM(s) Oral daily  psyllium Powder 1 Packet(s) Oral daily  thiamine 100 milliGRAM(s) Oral daily    MEDICATIONS  (PRN):  acetaminophen    Suspension 650 milliGRAM(s) Oral every 6 hours PRN For Temp greater than 38 C (100.4 F)  acetaminophen    Suspension. 650 milliGRAM(s) Oral every 6 hours PRN Moderate Pain (4 - 6)  dextrose Gel 1 Dose(s) Oral once PRN Blood Glucose LESS THAN 70 milliGRAM(s)/deciliter  glucagon  Injectable 1 milliGRAM(s) IntraMuscular once PRN Glucose LESS THAN 70 milligrams/deciliter      ALLERGIES:  Allergies    No Known Allergies    Intolerances        LABS:                        8.1    17.6  )-----------( 416      ( 19 Apr 2018 06:16 )             26.4     04-19    139  |  96  |  64<H>  ----------------------------<  232<H>  4.7   |  25  |  4.19<H>    Ca    9.3      19 Apr 2018 06:16  Phos  4.2     04-19  Mg     2.0     04-19      PTT - ( 20 Apr 2018 00:33 )  PTT:72.7 sec      RADIOLOGY & ADDITIONAL TESTS: Reviewed. INTERVAL HPI/OVERNIGHT EVENTS: pt required additional 6 units regular insulin coverage for     SUBJECTIVE: Patient seen and examined at bedside. NAD. No respiratory distress on pressure support. Pt continues to be nonverbal but responsive to tactile and verbal stimuli. Unable to assess ROS.     OBJECTIVE:    VITAL SIGNS:  ICU Vital Signs Last 24 Hrs  T(C): 36.7 (20 Apr 2018 01:22), Max: 37.2 (19 Apr 2018 21:59)  T(F): 98 (20 Apr 2018 01:22), Max: 98.9 (19 Apr 2018 21:59)  HR: 86 (20 Apr 2018 05:00) (84 - 116)  BP: 85/58 (20 Apr 2018 05:00) (76/53 - 106/74)  BP(mean): 67 (20 Apr 2018 05:00) (59 - 86)  ABP: --  ABP(mean): --  RR: 15 (20 Apr 2018 05:00) (10 - 33)  SpO2: 100% (20 Apr 2018 05:00) (97% - 100%)    Mode: AC/ CMV (Assist Control/ Continuous Mandatory Ventilation), RR (machine): 10, TV (machine): 500, FiO2: 40, PEEP: 5, ITime: 1, MAP: 8, PIP: 16    04-18 @ 07:01  -  04-19 @ 07:00  --------------------------------------------------------  IN: 2294.8 mL / OUT: 100 mL / NET: 2194.8 mL    04-19 @ 07:01  -  04-20 @ 06:20  --------------------------------------------------------  IN: 2174 mL / OUT: 1200 mL / NET: 974 mL      CAPILLARY BLOOD GLUCOSE      POCT Blood Glucose.: 275 mg/dL (19 Apr 2018 23:08)      PHYSICAL EXAM:    General: NAD, no respiratory distress on pressure support, cachectic, chronically ill-appearing   HEENT: NC/AT; PERRL, clear conjunctiva, MM dry  Neck: supple; trach in place  Respiratory: rhonchi b/l, upper airway secretions, no wheezing or rales, no retractions or labored breathing  Cardiovascular: +S1/S2; RRR  Abdomen: soft, mildly distended and tympanic to percussion, nontender, +BS, PEG in place C/D/I  Extremities: WWP, 1+ pitting edema to level of thigh b/l, however decreased from yesterday  Vasc: 2+ peripheral pulses b/l  Skin: dry, b/l LE with peeling dry skin, wrinkled skin of b/l feet  Neurological: nonverbal, not following commands, RUE flexed and rigid with tremor, LUE rigid but w/o spontaneous movement, no spontaneous movement of b/l LE, more alert and awake today    MEDICATIONS:  MEDICATIONS  (STANDING):  aspirin  chewable 81 milliGRAM(s) Oral daily  atorvastatin 80 milliGRAM(s) Oral at bedtime  cefTRIAXone Injectable. 2000 milliGRAM(s) IV Push every 24 hours  chlorhexidine 0.12% Liquid 15 milliLiter(s) Swish and Spit two times a day  chlorhexidine 2% Cloths 1 Application(s) Topical daily  dextrose 5%. 1000 milliLiter(s) (50 mL/Hr) IV Continuous <Continuous>  dextrose 50% Injectable 12.5 Gram(s) IV Push once  dextrose 50% Injectable 25 Gram(s) IV Push once  dextrose 50% Injectable 25 Gram(s) IV Push once  ergocalciferol Drops 6000 Unit(s) Oral daily  escitalopram 5 milliGRAM(s) Oral daily  fluconAZOLE IVPB 200 milliGRAM(s) IV Intermittent every 24 hours  folic acid 1 milliGRAM(s) Oral daily  heparin  Infusion 1700 Unit(s)/Hr (17 mL/Hr) IV Continuous <Continuous>  hydrocortisone sodium succinate Injectable 25 milliGRAM(s) IV Push every 12 hours  insulin glargine Injectable (LANTUS) 36 Unit(s) SubCutaneous at bedtime  insulin regular  human corrective regimen sliding scale   SubCutaneous every 6 hours  insulin regular  human recombinant 7 Unit(s) SubCutaneous every 6 hours  modafinil 100 milliGRAM(s) Oral <User Schedule>  multivitamin 1 Tablet(s) Oral daily  pantoprazole   Suspension 40 milliGRAM(s) Oral daily  psyllium Powder 1 Packet(s) Oral daily  thiamine 100 milliGRAM(s) Oral daily    MEDICATIONS  (PRN):  acetaminophen    Suspension 650 milliGRAM(s) Oral every 6 hours PRN For Temp greater than 38 C (100.4 F)  acetaminophen    Suspension. 650 milliGRAM(s) Oral every 6 hours PRN Moderate Pain (4 - 6)  dextrose Gel 1 Dose(s) Oral once PRN Blood Glucose LESS THAN 70 milliGRAM(s)/deciliter  glucagon  Injectable 1 milliGRAM(s) IntraMuscular once PRN Glucose LESS THAN 70 milligrams/deciliter      ALLERGIES:  Allergies    No Known Allergies    Intolerances        LABS:                        8.1    17.6  )-----------( 416      ( 19 Apr 2018 06:16 )             26.4     04-19    139  |  96  |  64<H>  ----------------------------<  232<H>  4.7   |  25  |  4.19<H>    Ca    9.3      19 Apr 2018 06:16  Phos  4.2     04-19  Mg     2.0     04-19      PTT - ( 20 Apr 2018 00:33 )  PTT:72.7 sec      RADIOLOGY & ADDITIONAL TESTS: Reviewed.

## 2018-04-21 DIAGNOSIS — I50.22 CHRONIC SYSTOLIC (CONGESTIVE) HEART FAILURE: ICD-10-CM

## 2018-04-21 LAB
ANION GAP SERPL CALC-SCNC: 19 MMOL/L — HIGH (ref 5–17)
APTT BLD: 54 SEC — HIGH (ref 27.5–37.4)
APTT BLD: 81.8 SEC — HIGH (ref 27.5–37.4)
APTT BLD: 83.5 SEC — HIGH (ref 27.5–37.4)
BASOPHILS NFR BLD AUTO: 0.1 % — SIGNIFICANT CHANGE UP (ref 0–2)
BUN SERPL-MCNC: 57 MG/DL — HIGH (ref 7–23)
CALCIUM SERPL-MCNC: 9.3 MG/DL — SIGNIFICANT CHANGE UP (ref 8.4–10.5)
CHLORIDE SERPL-SCNC: 102 MMOL/L — SIGNIFICANT CHANGE UP (ref 96–108)
CO2 SERPL-SCNC: 25 MMOL/L — SIGNIFICANT CHANGE UP (ref 22–31)
CREAT SERPL-MCNC: 3.78 MG/DL — HIGH (ref 0.5–1.3)
CULTURE RESULTS: NO GROWTH — SIGNIFICANT CHANGE UP
EOSINOPHIL NFR BLD AUTO: 0 % — SIGNIFICANT CHANGE UP (ref 0–6)
GLUCOSE BLDC GLUCOMTR-MCNC: 119 MG/DL — HIGH (ref 70–99)
GLUCOSE BLDC GLUCOMTR-MCNC: 119 MG/DL — HIGH (ref 70–99)
GLUCOSE BLDC GLUCOMTR-MCNC: 138 MG/DL — HIGH (ref 70–99)
GLUCOSE BLDC GLUCOMTR-MCNC: 73 MG/DL — SIGNIFICANT CHANGE UP (ref 70–99)
GLUCOSE SERPL-MCNC: 100 MG/DL — HIGH (ref 70–99)
HCT VFR BLD CALC: 27.6 % — LOW (ref 39–50)
HGB BLD-MCNC: 8.4 G/DL — LOW (ref 13–17)
LYMPHOCYTES # BLD AUTO: 5 % — LOW (ref 13–44)
MAGNESIUM SERPL-MCNC: 2.2 MG/DL — SIGNIFICANT CHANGE UP (ref 1.6–2.6)
MCHC RBC-ENTMCNC: 28.7 PG — SIGNIFICANT CHANGE UP (ref 27–34)
MCHC RBC-ENTMCNC: 30.4 G/DL — LOW (ref 32–36)
MCV RBC AUTO: 94.2 FL — SIGNIFICANT CHANGE UP (ref 80–100)
MONOCYTES NFR BLD AUTO: 3.6 % — SIGNIFICANT CHANGE UP (ref 2–14)
NEUTROPHILS NFR BLD AUTO: 91.3 % — HIGH (ref 43–77)
PHOSPHATE SERPL-MCNC: 4.5 MG/DL — SIGNIFICANT CHANGE UP (ref 2.5–4.5)
PLATELET # BLD AUTO: 388 K/UL — SIGNIFICANT CHANGE UP (ref 150–400)
POTASSIUM SERPL-MCNC: 5 MMOL/L — SIGNIFICANT CHANGE UP (ref 3.5–5.3)
POTASSIUM SERPL-SCNC: 5 MMOL/L — SIGNIFICANT CHANGE UP (ref 3.5–5.3)
RBC # BLD: 2.93 M/UL — LOW (ref 4.2–5.8)
RBC # FLD: 18.7 % — HIGH (ref 10.3–16.9)
SODIUM SERPL-SCNC: 146 MMOL/L — HIGH (ref 135–145)
SPECIMEN SOURCE: SIGNIFICANT CHANGE UP
WBC # BLD: 18.8 K/UL — HIGH (ref 3.8–10.5)
WBC # FLD AUTO: 18.8 K/UL — HIGH (ref 3.8–10.5)

## 2018-04-21 PROCEDURE — 99291 CRITICAL CARE FIRST HOUR: CPT

## 2018-04-21 PROCEDURE — 95951: CPT | Mod: 26

## 2018-04-21 PROCEDURE — 99233 SBSQ HOSP IP/OBS HIGH 50: CPT | Mod: GC

## 2018-04-21 PROCEDURE — 90935 HEMODIALYSIS ONE EVALUATION: CPT

## 2018-04-21 PROCEDURE — 71045 X-RAY EXAM CHEST 1 VIEW: CPT | Mod: 26

## 2018-04-21 RX ORDER — HEPARIN SODIUM 5000 [USP'U]/ML
1600 INJECTION INTRAVENOUS; SUBCUTANEOUS
Qty: 25000 | Refills: 0 | Status: DISCONTINUED | OUTPATIENT
Start: 2018-04-21 | End: 2018-04-25

## 2018-04-21 RX ORDER — DOXERCALCIFEROL 2.5 UG/1
2 CAPSULE ORAL ONCE
Qty: 0 | Refills: 0 | Status: COMPLETED | OUTPATIENT
Start: 2018-04-21 | End: 2018-04-21

## 2018-04-21 RX ORDER — ALBUMIN HUMAN 25 %
50 VIAL (ML) INTRAVENOUS ONCE
Qty: 0 | Refills: 0 | Status: COMPLETED | OUTPATIENT
Start: 2018-04-21 | End: 2018-04-21

## 2018-04-21 RX ORDER — HEPARIN SODIUM 5000 [USP'U]/ML
1600 INJECTION INTRAVENOUS; SUBCUTANEOUS
Qty: 25000 | Refills: 0 | Status: DISCONTINUED | OUTPATIENT
Start: 2018-04-21 | End: 2018-04-21

## 2018-04-21 RX ORDER — ALBUMIN HUMAN 25 %
VIAL (ML) INTRAVENOUS
Qty: 0 | Refills: 0 | Status: COMPLETED | OUTPATIENT
Start: 2018-04-21 | End: 2018-04-21

## 2018-04-21 RX ORDER — HEPARIN SODIUM 5000 [USP'U]/ML
1700 INJECTION INTRAVENOUS; SUBCUTANEOUS
Qty: 25000 | Refills: 0 | Status: DISCONTINUED | OUTPATIENT
Start: 2018-04-21 | End: 2018-04-21

## 2018-04-21 RX ADMIN — Medication 1 MILLIGRAM(S): at 12:56

## 2018-04-21 RX ADMIN — Medication 100 MILLIGRAM(S): at 12:56

## 2018-04-21 RX ADMIN — Medication 25 MILLIGRAM(S): at 22:37

## 2018-04-21 RX ADMIN — INSULIN HUMAN 10 UNIT(S): 100 INJECTION, SOLUTION SUBCUTANEOUS at 18:19

## 2018-04-21 RX ADMIN — Medication 1 TABLET(S): at 12:57

## 2018-04-21 RX ADMIN — Medication 50 MILLILITER(S): at 12:16

## 2018-04-21 RX ADMIN — INSULIN HUMAN 10 UNIT(S): 100 INJECTION, SOLUTION SUBCUTANEOUS at 07:51

## 2018-04-21 RX ADMIN — Medication 1 PACKET(S): at 12:56

## 2018-04-21 RX ADMIN — CEFTRIAXONE 2000 MILLIGRAM(S): 500 INJECTION, POWDER, FOR SOLUTION INTRAMUSCULAR; INTRAVENOUS at 18:14

## 2018-04-21 RX ADMIN — ATORVASTATIN CALCIUM 80 MILLIGRAM(S): 80 TABLET, FILM COATED ORAL at 23:02

## 2018-04-21 RX ADMIN — CHLORHEXIDINE GLUCONATE 15 MILLILITER(S): 213 SOLUTION TOPICAL at 23:02

## 2018-04-21 RX ADMIN — DOXERCALCIFEROL 2 MICROGRAM(S): 2.5 CAPSULE ORAL at 12:26

## 2018-04-21 RX ADMIN — Medication 50 MILLILITER(S): at 13:35

## 2018-04-21 RX ADMIN — FLUCONAZOLE 100 MILLIGRAM(S): 150 TABLET ORAL at 22:38

## 2018-04-21 RX ADMIN — INSULIN HUMAN 10 UNIT(S): 100 INJECTION, SOLUTION SUBCUTANEOUS at 12:57

## 2018-04-21 RX ADMIN — PANTOPRAZOLE SODIUM 40 MILLIGRAM(S): 20 TABLET, DELAYED RELEASE ORAL at 12:56

## 2018-04-21 RX ADMIN — HEPARIN SODIUM 16 UNIT(S)/HR: 5000 INJECTION INTRAVENOUS; SUBCUTANEOUS at 15:54

## 2018-04-21 RX ADMIN — Medication 81 MILLIGRAM(S): at 12:57

## 2018-04-21 RX ADMIN — ESCITALOPRAM OXALATE 5 MILLIGRAM(S): 10 TABLET, FILM COATED ORAL at 12:56

## 2018-04-21 RX ADMIN — MODAFINIL 100 MILLIGRAM(S): 200 TABLET ORAL at 07:52

## 2018-04-21 RX ADMIN — CHLORHEXIDINE GLUCONATE 15 MILLILITER(S): 213 SOLUTION TOPICAL at 10:46

## 2018-04-21 RX ADMIN — INSULIN GLARGINE 42 UNIT(S): 100 INJECTION, SOLUTION SUBCUTANEOUS at 22:39

## 2018-04-21 RX ADMIN — Medication 25 MILLIGRAM(S): at 10:45

## 2018-04-21 RX ADMIN — CHLORHEXIDINE GLUCONATE 1 APPLICATION(S): 213 SOLUTION TOPICAL at 07:50

## 2018-04-21 RX ADMIN — ERGOCALCIFEROL 6000 UNIT(S): 1.25 CAPSULE ORAL at 12:56

## 2018-04-21 RX ADMIN — MODAFINIL 100 MILLIGRAM(S): 200 TABLET ORAL at 12:59

## 2018-04-21 NOTE — PROGRESS NOTE ADULT - SUBJECTIVE AND OBJECTIVE BOX
Patient was seen and evaluated on dialysis.   Patient is tolerating the procedure well.   HR: 104 (04-21-18 @ 12:00)  BP: 80/56 (04-21-18 @ 12:00) pusle 65, /67  Continue dialysis:   Dialyzer:  180     QB: 400       QD: 500  Goal UF 1.5 liters over 3 Hours     Albumin with HD

## 2018-04-21 NOTE — PROGRESS NOTE ADULT - SUBJECTIVE AND OBJECTIVE BOX
Seen in the ICU. s/p tracheostomy, poor mental status. Does not follow consistently commands Last HD done on 4/19     Renal service for GILMER – ATN – from cardiogenic shock     acetaminophen    Suspension 650 milliGRAM(s) every 6 hours PRN  acetaminophen    Suspension. 650 milliGRAM(s) every 6 hours PRN  albumin human 25% IVPB 50 milliLiter(s) once  albumin human 25% IVPB 50 milliLiter(s) once  albumin human 25% IVPB     aspirin  chewable 81 milliGRAM(s) daily  atorvastatin 80 milliGRAM(s) at bedtime  cefTRIAXone Injectable. 2000 milliGRAM(s) every 24 hours  chlorhexidine 0.12% Liquid 15 milliLiter(s) two times a day  chlorhexidine 2% Cloths 1 Application(s) daily  dextrose 5%. 1000 milliLiter(s) <Continuous>  dextrose 50% Injectable 12.5 Gram(s) once  dextrose 50% Injectable 25 Gram(s) once  dextrose 50% Injectable 25 Gram(s) once  dextrose Gel 1 Dose(s) once PRN  doxercalciferol Injectable 2 MICROGram(s) once  ergocalciferol Drops 6000 Unit(s) daily  escitalopram 5 milliGRAM(s) daily  fluconAZOLE IVPB 200 milliGRAM(s) every 24 hours  folic acid 1 milliGRAM(s) daily  glucagon  Injectable 1 milliGRAM(s) once PRN  heparin  Infusion 1600 Unit(s)/Hr <Continuous>  hydrocortisone sodium succinate Injectable 25 milliGRAM(s) every 12 hours  insulin glargine Injectable (LANTUS) 42 Unit(s) at bedtime  insulin regular  human corrective regimen sliding scale   every 6 hours  insulin regular  human recombinant 10 Unit(s) every 6 hours  modafinil 100 milliGRAM(s) <User Schedule>  multivitamin 1 Tablet(s) daily  pantoprazole   Suspension 40 milliGRAM(s) daily  psyllium Powder 1 Packet(s) daily  thiamine 100 milliGRAM(s) daily      Allergies    No Known Allergies    Intolerances        T(C): , Max: 37.4 (04-21-18 @ 06:14)  T(F): , Max: 99.3 (04-21-18 @ 06:14)  HR: 104 (04-21-18 @ 12:00)  BP: 68/51 (04-21-18 @ 12:00)  BP(mean): 56 (04-21-18 @ 12:00)  RR: 21 (04-21-18 @ 12:00)  SpO2: 100% (04-21-18 @ 12:00)  Wt(kg): --    Physical exam:   Does not follow commands   Cachectic   S/p tracheostomy   Normal air entry into the lungs, no wheezing, no crackles   RRR, normal s1/s2, no murmurs, rubs or gallops   Abdomen – soft, not tender, not distended   Extremities: no edema, s/p removal of AVG   Perm cath on the right of his chest   Patient seen and examined at bedside.     04-20 @ 07:01  -  04-21 @ 07:00  --------------------------------------------------------  IN:    heparin Infusion: 136 mL    heparin Infusion: 97 mL    heparin Infusion: 128 mL    Vital HN: 1323 mL  Total IN: 1684 mL    OUT:    Rectal Tube: 350 mL  Total OUT: 350 mL    Total NET: 1334 mL      04-21 @ 07:01  -  04-21 @ 12:13  --------------------------------------------------------  IN:    heparin Infusion: 32 mL    Vital HN: 252 mL  Total IN: 284 mL    OUT:  Total OUT: 0 mL    Total NET: 284 mL              LABS:                        8.4    18.8  )-----------( 388      ( 21 Apr 2018 06:32 )             27.6     04-21    146<H>  |  102  |  57<H>  ----------------------------<  100<H>  5.0   |  25  |  3.78<H>    Ca    9.3      21 Apr 2018 06:33  Phos  4.5     04-21  Mg     2.2     04-21        PTT - ( 21 Apr 2018 06:33 )  PTT:81.8 sec          RADIOLOGY & ADDITIONAL STUDIES:

## 2018-04-21 NOTE — PROGRESS NOTE ADULT - PROBLEM SELECTOR PLAN 1
-Due to ATN – from cardiogenic shock – not resolving – on HD   -Last HD done on 4/19   -We will do HD today   -Electrolytes noted   -Volume status acceptable – we will do UF – 1 liter to 1.5 liters as he tolerates   -In and outs   -Daily weights   -Renal diet

## 2018-04-21 NOTE — PROGRESS NOTE ADULT - ASSESSMENT
The patient with GILMER – due to ATN – not resolving. – oligo/anuric. Requiring HD. LasT hd done on 4/19. We will perform regular HD today

## 2018-04-21 NOTE — PROGRESS NOTE ADULT - ASSESSMENT
Given new / worsening twitching movements, recommend EEG to look for evidence of seizures. If / when mental status improves, should be better able to determine how much cervical spine compression is affecting neurologic function, currently difficult to determine.

## 2018-04-21 NOTE — PROGRESS NOTE ADULT - SUBJECTIVE AND OBJECTIVE BOX
INTERVAL HPI/OVERNIGHT EVENTS:    SUBJECTIVE: Patient seen and examined at bedside.     CONSTITUTIONAL: No weakness, fevers or chills  EYES/ENT: No visual changes;  No vertigo or throat pain   NECK: No pain or stiffness  RESPIRATORY: No cough, wheezing, hemoptysis; No shortness of breath  CARDIOVASCULAR: No chest pain or palpitations  GASTROINTESTINAL: No abdominal or epigastric pain. No nausea, vomiting, or hematemesis; No diarrhea or constipation. No melena or hematochezia.  GENITOURINARY: No dysuria, frequency or hematuria  NEUROLOGICAL: No numbness or weakness  SKIN: No itching, rashes    OBJECTIVE:    VITAL SIGNS:  ICU Vital Signs Last 24 Hrs  T(C): 37.1 (21 Apr 2018 02:26), Max: 37.1 (21 Apr 2018 02:26)  T(F): 98.8 (21 Apr 2018 02:26), Max: 98.8 (21 Apr 2018 02:26)  HR: 106 (21 Apr 2018 05:00) (88 - 112)  BP: 104/77 (21 Apr 2018 05:00) (70/55 - 104/77)  BP(mean): 86 (21 Apr 2018 05:00) (60 - 86)  ABP: --  ABP(mean): --  RR: 10 (21 Apr 2018 05:00) (10 - 45)  SpO2: 100% (21 Apr 2018 05:00) (95% - 100%)    Mode: AC/ CMV (Assist Control/ Continuous Mandatory Ventilation), RR (machine): 10, TV (machine): 500, FiO2: 40, PEEP: 5, ITime: 1, MAP: 8, PIP: 22    04-19 @ 07:01 - 04-20 @ 07:00  --------------------------------------------------------  IN: 2448 mL / OUT: 1200 mL / NET: 1248 mL    04-20 @ 07:01  -  04-21 @ 06:26  --------------------------------------------------------  IN: 1526 mL / OUT: 0 mL / NET: 1526 mL      CAPILLARY BLOOD GLUCOSE      POCT Blood Glucose.: 185 mg/dL (20 Apr 2018 22:52)      PHYSICAL EXAM:    General: NAD  HEENT: NC/AT; PERRL, clear conjunctiva  Neck: supple  Respiratory: CTA b/l  Cardiovascular: +S1/S2; RRR  Abdomen: soft, NT/ND; +BS x4  Extremities: WWP, 2+ peripheral pulses b/l; no LE edema  Skin: normal color and turgor; no rash  Neurological:    MEDICATIONS:  MEDICATIONS  (STANDING):  aspirin  chewable 81 milliGRAM(s) Oral daily  atorvastatin 80 milliGRAM(s) Oral at bedtime  cefTRIAXone Injectable. 2000 milliGRAM(s) IV Push every 24 hours  chlorhexidine 0.12% Liquid 15 milliLiter(s) Swish and Spit two times a day  chlorhexidine 2% Cloths 1 Application(s) Topical daily  dextrose 5%. 1000 milliLiter(s) (50 mL/Hr) IV Continuous <Continuous>  dextrose 50% Injectable 12.5 Gram(s) IV Push once  dextrose 50% Injectable 25 Gram(s) IV Push once  dextrose 50% Injectable 25 Gram(s) IV Push once  ergocalciferol Drops 6000 Unit(s) Oral daily  escitalopram 5 milliGRAM(s) Oral daily  fluconAZOLE IVPB 200 milliGRAM(s) IV Intermittent every 24 hours  folic acid 1 milliGRAM(s) Oral daily  heparin  Infusion 1600 Unit(s)/Hr (16 mL/Hr) IV Continuous <Continuous>  hydrocortisone sodium succinate Injectable 25 milliGRAM(s) IV Push every 12 hours  insulin glargine Injectable (LANTUS) 42 Unit(s) SubCutaneous at bedtime  insulin regular  human corrective regimen sliding scale   SubCutaneous every 6 hours  insulin regular  human recombinant 10 Unit(s) SubCutaneous every 6 hours  modafinil 100 milliGRAM(s) Oral <User Schedule>  multivitamin 1 Tablet(s) Oral daily  pantoprazole   Suspension 40 milliGRAM(s) Oral daily  psyllium Powder 1 Packet(s) Oral daily  thiamine 100 milliGRAM(s) Oral daily    MEDICATIONS  (PRN):  acetaminophen    Suspension 650 milliGRAM(s) Oral every 6 hours PRN For Temp greater than 38 C (100.4 F)  acetaminophen    Suspension. 650 milliGRAM(s) Oral every 6 hours PRN Moderate Pain (4 - 6)  dextrose Gel 1 Dose(s) Oral once PRN Blood Glucose LESS THAN 70 milliGRAM(s)/deciliter  glucagon  Injectable 1 milliGRAM(s) IntraMuscular once PRN Glucose LESS THAN 70 milligrams/deciliter      ALLERGIES:  Allergies    No Known Allergies    Intolerances        LABS:                        7.2    13.3  )-----------( 336      ( 20 Apr 2018 05:53 )             23.5     04-20    137  |  96  |  47<H>  ----------------------------<  265<H>  4.3   |  24  |  2.96<H>    Ca    8.9      20 Apr 2018 05:53  Phos  3.8     04-20  Mg     2.1     04-20      PTT - ( 20 Apr 2018 23:29 )  PTT:78.2 sec      RADIOLOGY & ADDITIONAL TESTS: Reviewed. INTERVAL HPI/OVERNIGHT EVENTS: 10PM PTT therpeutic. Some oozing of blood around the L HD cath around 5AM. AM PTT therapeutic. Rapid afib for 3 mins when pt was being cleaned, resolved after settled back into bed. One episode of SVT to 170s for 3 seconds    SUBJECTIVE: Patient seen and examined at bedside. NAD. No respiratory distress. Arousable to verbal and tactile stimuli. Non-verbal. Not following command. Unable to assess ROS.       OBJECTIVE:    VITAL SIGNS:  ICU Vital Signs Last 24 Hrs  T(C): 37.1 (21 Apr 2018 02:26), Max: 37.1 (21 Apr 2018 02:26)  T(F): 98.8 (21 Apr 2018 02:26), Max: 98.8 (21 Apr 2018 02:26)  HR: 106 (21 Apr 2018 05:00) (88 - 112)  BP: 104/77 (21 Apr 2018 05:00) (70/55 - 104/77)  BP(mean): 86 (21 Apr 2018 05:00) (60 - 86)  ABP: --  ABP(mean): --  RR: 10 (21 Apr 2018 05:00) (10 - 45)  SpO2: 100% (21 Apr 2018 05:00) (95% - 100%)    Mode: AC/ CMV (Assist Control/ Continuous Mandatory Ventilation), RR (machine): 10, TV (machine): 500, FiO2: 40, PEEP: 5, ITime: 1, MAP: 8, PIP: 22    04-19 @ 07:01  -  04-20 @ 07:00  --------------------------------------------------------  IN: 2448 mL / OUT: 1200 mL / NET: 1248 mL    04-20 @ 07:01  -  04-21 @ 06:26  --------------------------------------------------------  IN: 1526 mL / OUT: 0 mL / NET: 1526 mL      CAPILLARY BLOOD GLUCOSE      POCT Blood Glucose.: 185 mg/dL (20 Apr 2018 22:52)      PHYSICAL EXAM:    General: NAD, no respiratory distress on pressure support, cachectic, chronically ill-appearing   HEENT: NC/AT; PERRL, clear conjunctiva, MM dry  Neck: supple; trach in place; blood noted under L HD cath dressing  Respiratory: rhonchi b/l, upper airway secretions, + wheezing, no retractions or labored breathing  Cardiovascular: +S1/S2; RRR  Abdomen: soft, mildly distended and tympanic to percussion, nontender, +BS, PEG in place C/D/I  Extremities: WWP, 1+ pitting edema to level of thigh b/l  Vasc: 2+ peripheral pulses b/l  Skin: dry, b/l LE with peeling dry skin, wrinkled skin of b/l feet  Neurological: nonverbal, not following commands, RUE flexed and rigid with tremor, LUE rigid but w/o spontaneous movement, no spontaneous movement of b/l LE, alert and awake    MEDICATIONS:  MEDICATIONS  (STANDING):  aspirin  chewable 81 milliGRAM(s) Oral daily  atorvastatin 80 milliGRAM(s) Oral at bedtime  cefTRIAXone Injectable. 2000 milliGRAM(s) IV Push every 24 hours  chlorhexidine 0.12% Liquid 15 milliLiter(s) Swish and Spit two times a day  chlorhexidine 2% Cloths 1 Application(s) Topical daily  dextrose 5%. 1000 milliLiter(s) (50 mL/Hr) IV Continuous <Continuous>  dextrose 50% Injectable 12.5 Gram(s) IV Push once  dextrose 50% Injectable 25 Gram(s) IV Push once  dextrose 50% Injectable 25 Gram(s) IV Push once  ergocalciferol Drops 6000 Unit(s) Oral daily  escitalopram 5 milliGRAM(s) Oral daily  fluconAZOLE IVPB 200 milliGRAM(s) IV Intermittent every 24 hours  folic acid 1 milliGRAM(s) Oral daily  heparin  Infusion 1600 Unit(s)/Hr (16 mL/Hr) IV Continuous <Continuous>  hydrocortisone sodium succinate Injectable 25 milliGRAM(s) IV Push every 12 hours  insulin glargine Injectable (LANTUS) 42 Unit(s) SubCutaneous at bedtime  insulin regular  human corrective regimen sliding scale   SubCutaneous every 6 hours  insulin regular  human recombinant 10 Unit(s) SubCutaneous every 6 hours  modafinil 100 milliGRAM(s) Oral <User Schedule>  multivitamin 1 Tablet(s) Oral daily  pantoprazole   Suspension 40 milliGRAM(s) Oral daily  psyllium Powder 1 Packet(s) Oral daily  thiamine 100 milliGRAM(s) Oral daily    MEDICATIONS  (PRN):  acetaminophen    Suspension 650 milliGRAM(s) Oral every 6 hours PRN For Temp greater than 38 C (100.4 F)  acetaminophen    Suspension. 650 milliGRAM(s) Oral every 6 hours PRN Moderate Pain (4 - 6)  dextrose Gel 1 Dose(s) Oral once PRN Blood Glucose LESS THAN 70 milliGRAM(s)/deciliter  glucagon  Injectable 1 milliGRAM(s) IntraMuscular once PRN Glucose LESS THAN 70 milligrams/deciliter      ALLERGIES:  Allergies    No Known Allergies    Intolerances        LABS:                        7.2    13.3  )-----------( 336      ( 20 Apr 2018 05:53 )             23.5     04-20    137  |  96  |  47<H>  ----------------------------<  265<H>  4.3   |  24  |  2.96<H>    Ca    8.9      20 Apr 2018 05:53  Phos  3.8     04-20  Mg     2.1     04-20      PTT - ( 20 Apr 2018 23:29 )  PTT:78.2 sec      RADIOLOGY & ADDITIONAL TESTS: Reviewed.

## 2018-04-21 NOTE — PROGRESS NOTE ADULT - SUBJECTIVE AND OBJECTIVE BOX
Mental status continues to fluctuate - at times more alert than others.     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 disc herniation compressing the spinal cord to a mild-mod degree, more so on the right than the left    Exam:  MS: nods head "yes" when asked if he can hear examiner, does not follow simple commands (asked to stick out tongue, give thumbs up, show two fingers)  CN: does not blink to threat, no roving eye movements as was present earlier, opens eyes to name at times  Motor: increased tone throughout, left arm extended right arm flexed, moves right arm spontaneously, moves legs minimally to command, does not move left arm	  + snout and suck reflexes, no palmomental  semi-rhythmic movements of lips and bilateral hands / fingers

## 2018-04-21 NOTE — PROGRESS NOTE ADULT - ASSESSMENT
63M PMH HFrEF 10-15% (ischemic), MI, s/p AICD vs PPM, possible Afib, HTN, DM2 on insulin, possible CKD, and gout, who presented with a chief complaint of generalized weakness s/p septic shock likely 2/2 LE cellulitis complicated by ATN requiring dialysis. Now with AMS likely from brainstem infarct and/or toxic metabolic encephalopathy, now with candidemia (resolving) and sepsis 2/2 klebsiella bacteremia. He was intiially treated with meropenem, then switched to zosyn, then finally to ceftriaxone when sensitivities were available. Hydrocortisone was discontinued due to infection. Patient has continued to have low grade temperatures and intermittently required levophed, hydrocortisone restarted on 4/17.    NEURO  #Toxic Metabolic Encephalopathy- Likely 2/2 acute infarct on imaging; Patient acutely unresponsive since 3/22 and has had waxing/ waning mental status since. CT, CTA negative. VEEG w/ no seizure activity. Decline in mental status likely 2/2 to underlying infection vs acute neurologic event. 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 Per neurology may have had ischemic event from hypoperfusion. Also showing disc protrusion at C3/C4 level coursing superiorly to the C2/C3 contacting the ventral spinal cord. Per neurology, this may be contributing to weakness, however would not explain change in mental status.  S/p PEG/trach on 4/4. Repeat CT head 4/11 performed due to concern for not moving LUE, showing only chronic infarctions.  - C/w Modafinil  - CT C-spine w/o contrast showing multilevel degenerative changes with mod-severe foraminal narrowing @ C3-C4  - per neuro, no acute interventions     ID    #Klebsiella Bacteremia- Urine, blood and sputum cx growing klebsiella. S/p Zosyn 4/14-4/15. S/p Meropenem 4/13. Sensitive to ceftriaxone.  -C/w Ceftriaxone 2g q 24 hrs (4/15-4/26)  -surveillance blood cultures NGTD   -f/u ID recs  -s/p permacath removal 4/19 with leukocytosis downtrending, afebrile     #Candidemia with candida tropicalis- Blood cultures positive for candida tropicalis on 4/7 and again on 4/9. No source identified. CTAP 4/11 negative for abscess. RUKHSANA negative for vegetations/ infection of AICD; Tunelled HD catheter removed 4/8, temp HD placed 4/10, permanent HD cath placed 4/12  - c/w Fluconazole 200mg q 24hrs (4/13-4/24)  - Surveillance fungal cultures 4/10 NGTD  - ID following, f/u recs  - permacath removed 4/19    #UTI- urine culture from 4/11 growing klebsiella sensitive to ceftriaxone, and now bacteremia with GNR likely 2/2 klebsiella (in urine and lung).  -c/w management as per above    CARDIOVASCULAR   # HYPOTENSION-now off levophed  - c/w Midodrine 15 mg q8h to maintain SBP>65  - c/w hydrocortisone 25mg BID    # CHF exacerbation- CHF with EF 10-15% per pt 2/2 ischemic cardiomyopathy s/p AICD. Elevated BNP on admission with R sided effusion and edema. Patient with persistent pleural effusions on CXR and US and b/l dependent edema.   - Maintain negative fluid balance with dialysis  - c/w holding ACE/BB in setting of sepsis/ hypotension on midodrine    #AFIB- hx of Afib and LV thrombus on coumadin prior to admission; TTE with definity shows no LV thrombus currently   - c/w Heparin gtt   - c/w holding Metoprolol 12.5 q12h given hypotension. Will use Dig for rate control if RVR  - HR 90s-100s; goal <110    #CAD- Hx of MI and ischemic CM s/p AICD, STEMI 7/2017, was started on Aspirin and Brilinta per records from [place]  -aspirin 81  -Atorvastatin 80mg   -on hep gtt    #LE Edema   - LE edema with chronic venous stasis  - Duplex does not show DVT    PULMONARY   #Chronic Resp failure- S/p tracheostomy 4/5. Bedside US with simple b/l pleural effusions and atelectatic lung. Less concern for infectious source given b/l effusions with no septations/loculations. Likely related to fluid overload. If clinically worsening, can consider thoracentesis for fluid studies and cultures.  - c/w daily CPAP trials, weaning to trach collar as tolerated  - c/w HD to remove excess fluid    RENAL   #Chronic renal failure- likely ischemic ATN in setting of sepsis with low intravascular volume. Unknown baseline Cr presenting with Cr 3.93 with metabolic derangements. Renal team on board, now on intermittent HD. HD catheter removed 4/8 due to fungemia, temporary HD catheter placed 4/10.  Now s/p permacath replacement 4/12. RP US showed medical renal disease.  - Last dialyzed 4/19  - c/w intermittent HD per renal recs - plan for next session 4/21   - permacath removed 4/19 and replaced with L subclavian HD cath    #Adrenal Insufficiency: BP improved immediately after hydrocortisone and able to wean off levophed  -c/w hydrocortisone 25 mg BID given marginal response to cortrysyn stimulation test    #Elevated AG- Fluctuating at low level; lactate negative, glucose has been well controlled, possibly 2/2 uremia in setting of ESRD.   - Monitor BMP    #Hyperkalemia- PT with intermittently elevated K,  no EKG changes.  - Continue telemetry monitoring  - Trend K   - c/w dialysis   - Kayexalate PRN    GI   #PEG in place: C/D/I  -c/w tube feeds (holding for IR procedure)    #Diarrhea likely 2/2 tube feeds, C.diff ruled out  -rectal tube in place    HEME  #Thrombocytopenia- ELVIRA negative but PF4 was positive. Unlikely HIT. Most likely 2/2 zosyn or sepsis.   -resolved  -monitor CBC    #Elevated INR. Unclear etiology.   - Given Vit K and FFP3/12. FFP 3/13, FFP 4/4  - Monitor coags    #Anemia- Likely 2/2 phlebotomy and CKD, no signs of bleeding. Prior studies showed Anemia of chronic disease. s/p 1pRBC on 3/12, 3/16 and 4/4  -monitor CBC    ENDOCRINE   #Diabetes: HbA1C 8.6  - feeds resumed  - regular insulin 10 units q6  - lantus 45 unit qhs  - MISS  - monitor FSG and adjust as needed     F: No IVF   E: Replete K<4 Mg<2, caution in setting of acute renal failure  N: PEG Placed 4/4, feeds changed to Vital 1.5 given diarrhea.   DVT ppx: on heparin drip  GI ppx: PPI 40 daily  Lines: L subclavian HD cath placed 4/19  Drips: none  Full code  Dispo: MICU 63M PMH HFrEF 10-15% (ischemic), MI, s/p AICD vs PPM, possible Afib, HTN, DM2 on insulin, possible CKD, and gout, who presented with a chief complaint of generalized weakness s/p septic shock likely 2/2 LE cellulitis complicated by ATN requiring dialysis. Now with AMS likely from brainstem infarct and/or toxic metabolic encephalopathy, now with candidemia (resolving) and sepsis 2/2 klebsiella bacteremia. He was intiially treated with meropenem, then switched to zosyn, then finally to ceftriaxone when sensitivities were available. Hydrocortisone was discontinued due to infection. Patient has continued to have low grade temperatures and intermittently required levophed, hydrocortisone restarted on 4/17.    NEURO  #Toxic Metabolic Encephalopathy- Likely 2/2 acute infarct on imaging; Patient acutely unresponsive since 3/22 and has had waxing/ waning mental status since. CT, CTA negative. VEEG w/ no seizure activity. Decline in mental status likely 2/2 to underlying infection vs acute neurologic event. 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 Per neurology may have had ischemic event from hypoperfusion. Also showing disc protrusion at C3/C4 level coursing superiorly to the C2/C3 contacting the ventral spinal cord. Per neurology, this may be contributing to weakness, however would not explain change in mental status.  S/p PEG/trach on 4/4. Repeat CT head 4/11 performed due to concern for not moving LUE, showing only chronic infarctions.  - C/w Modafinil  - CT C-spine w/o contrast showing multilevel degenerative changes with mod-severe foraminal narrowing @ C3-C4  - per neuro, no acute interventions   - patient likely at new baseline mental status  - VEEG ordered r/o seizures    ID    #Klebsiella Bacteremia- Urine, blood and sputum cx growing klebsiella. S/p Zosyn 4/14-4/15. S/p Meropenem 4/13. Sensitive to ceftriaxone.  -C/w Ceftriaxone 2g q 24 hrs (4/15-4/26)  -surveillance blood cultures NGTD   -f/u ID recs  -s/p permacath removal 4/19; cath tip negative growth     #Candidemia with candida tropicalis- Blood cultures positive for candida tropicalis on 4/7 and again on 4/9. No source identified. CTAP 4/11 negative for abscess. RUKHSANA negative for vegetations/ infection of AICD; Tunelled HD catheter removed 4/8, temp HD placed 4/10, permanent HD cath placed 4/12  - c/w Fluconazole 200mg q 24hrs (4/13-4/24)  - Surveillance fungal cultures 4/10 NGTD  - ID following, f/u recs  - permacath removed 4/19    #UTI- urine culture from 4/11 growing klebsiella sensitive to ceftriaxone, and now bacteremia with GNR likely 2/2 klebsiella (in urine and lung).  -c/w management as per above    CARDIOVASCULAR   # HYPOTENSION-now off levophed  - c/w Midodrine 15 mg q8h to maintain SBP>65  - c/w hydrocortisone 25mg BID    # CHF exacerbation- CHF with EF 10-15% per pt 2/2 ischemic cardiomyopathy s/p AICD. Elevated BNP on admission with R sided effusion and edema. Patient with persistent pleural effusions on CXR and US and b/l dependent edema.   - Maintain negative fluid balance with dialysis  - c/w holding ACE/BB in setting of sepsis/ hypotension on midodrine    #AFIB- hx of Afib and LV thrombus on coumadin prior to admission; TTE with definity shows no LV thrombus currently   - c/w Heparin gtt as oozing from L cath site stopped  - c/w holding Metoprolol 12.5 q12h given hypotension. Will use Dig for rate control if RVR  - HR 90s-100s; goal <110    #CAD- Hx of MI and ischemic CM s/p AICD, STEMI 7/2017, was started on Aspirin and Brilinta per records from [place]  -aspirin 81  -Atorvastatin 80mg   -on hep gtt    #LE Edema   - LE edema with chronic venous stasis  - Duplex does not show DVT    PULMONARY   #Chronic Resp failure- S/p tracheostomy 4/5. Bedside US with simple b/l pleural effusions and atelectatic lung. Less concern for infectious source given b/l effusions with no septations/loculations. Likely related to fluid overload. If clinically worsening, can consider thoracentesis for fluid studies and cultures.  - c/w daily CPAP trials, weaning to trach collar as tolerated  - c/w HD to remove excess fluid    RENAL   #Chronic renal failure- likely ischemic ATN in setting of sepsis with low intravascular volume. Unknown baseline Cr presenting with Cr 3.93 with metabolic derangements. Renal team on board, now on intermittent HD. HD catheter removed 4/8 due to fungemia, temporary HD catheter placed 4/10.  Now s/p permacath replacement 4/12. RP US showed medical renal disease. permacath removed 4/19 and replaced with L subclavian HD cath  - Last dialyzed 4/19  - c/w intermittent HD per renal recs  -HD today 4/21    #Adrenal Insufficiency: BP improved immediately after hydrocortisone and able to wean off levophed  -c/w hydrocortisone 25 mg BID given marginal response to cortrysyn stimulation test    #Elevated AG- Fluctuating at low level; lactate negative, glucose has been well controlled, possibly 2/2 uremia in setting of ESRD.   - Monitor BMP    #Hyperkalemia- PT with intermittently elevated K,  no EKG changes.  - Continue telemetry monitoring  - Trend K   - c/w dialysis   - Kayexalate PRN    GI   #PEG in place: C/D/I  -c/w tube feeds (holding for IR procedure)    #Diarrhea likely 2/2 tube feeds, C.diff ruled out  -rectal tube in place    HEME  #Thrombocytopenia- ELVIRA negative but PF4 was positive. Unlikely HIT. Most likely 2/2 zosyn or sepsis.   -resolved  -monitor CBC    #Elevated INR. Unclear etiology.   - Given Vit K and FFP3/12. FFP 3/13, FFP 4/4  - Monitor coags    #Anemia- Likely 2/2 phlebotomy and CKD, no signs of bleeding. Prior studies showed Anemia of chronic disease. s/p 1pRBC on 3/12, 3/16 and 4/4  -monitor CBC    ENDOCRINE   #Diabetes: HbA1C 8.6  - feeds resumed  - regular insulin 10 units q6  - lantus 45 unit qhs  - MISS  - monitor FSG and adjust as needed     F: No IVF   E: Replete K<4 Mg<2, caution in setting of acute renal failure  N: PEG Placed 4/4, feeds changed to Vital 1.5 given diarrhea.   DVT ppx: on heparin drip  GI ppx: PPI 40 daily  Lines: L subclavian HD cath placed 4/19  Drips: none  Full code  Dispo: MICU 63M PMH HFrEF 10-15% (ischemic), MI, s/p AICD vs PPM, possible Afib, HTN, DM2 on insulin, possible CKD, and gout, who presented with a chief complaint of generalized weakness s/p septic shock likely 2/2 LE cellulitis complicated by ATN requiring dialysis. Now with AMS likely from brainstem infarct and/or toxic metabolic encephalopathy, now with candidemia (resolving) and sepsis 2/2 klebsiella bacteremia. He was intiially treated with meropenem, then switched to zosyn, then finally to ceftriaxone when sensitivities were available. Hydrocortisone was discontinued due to infection. Patient has continued to have low grade temperatures and intermittently required levophed, hydrocortisone restarted on 4/17.    NEURO  #Toxic Metabolic Encephalopathy- Likely 2/2 acute infarct on imaging; Patient acutely unresponsive since 3/22 and has had waxing/ waning mental status since. CT, CTA negative. VEEG w/ no seizure activity. Decline in mental status likely 2/2 to underlying infection vs acute neurologic event. 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 Per neurology may have had ischemic event from hypoperfusion. Also showing disc protrusion at C3/C4 level coursing superiorly to the C2/C3 contacting the ventral spinal cord. Per neurology, this may be contributing to weakness, however would not explain change in mental status.  S/p PEG/trach on 4/4. Repeat CT head 4/11 performed due to concern for not moving LUE, showing only chronic infarctions.  - C/w Modafinil  - CT C-spine w/o contrast showing multilevel degenerative changes with mod-severe foraminal narrowing @ C3-C4  - per neuro, no acute interventions   - patient likely at new baseline mental status  - VEEG ordered r/o seizures    ID    #Klebsiella Bacteremia- Urine, blood and sputum cx growing klebsiella. S/p Zosyn 4/14-4/15. S/p Meropenem 4/13. Sensitive to ceftriaxone.  -C/w Ceftriaxone 2g q 24 hrs (4/15-4/26)  -surveillance blood cultures NGTD   -f/u ID recs  -s/p permacath removal 4/19; cath tip negative growth     #Candidemia with candida tropicalis- Blood cultures positive for candida tropicalis on 4/7 and again on 4/9. No source identified. CTAP 4/11 negative for abscess. RUKHSANA negative for vegetations/ infection of AICD; Tunelled HD catheter removed 4/8, temp HD placed 4/10, permanent HD cath placed 4/12  - c/w Fluconazole 200mg q 24hrs (4/13-4/24)  - Surveillance fungal cultures 4/10 NGTD  - ID following, f/u recs  - permacath removed 4/19    #UTI- urine culture from 4/11 growing klebsiella sensitive to ceftriaxone, and now bacteremia with GNR likely 2/2 klebsiella (in urine and lung).  -c/w management as per above    CARDIOVASCULAR   # HYPOTENSION-now off levophed  - c/w Midodrine 15 mg q8h to maintain SBP>65  - c/w hydrocortisone 25mg BID    # CHF exacerbation- CHF with EF 10-15% per pt 2/2 ischemic cardiomyopathy s/p AICD. Elevated BNP on admission with R sided effusion and edema. Patient with persistent pleural effusions on CXR and US and b/l dependent edema.   - Maintain negative fluid balance with dialysis  - c/w holding ACE/BB in setting of sepsis/ hypotension on midodrine    #AFIB- hx of Afib and LV thrombus on coumadin prior to admission; TTE with definity shows no LV thrombus currently   - c/w Heparin gtt as oozing from L cath site stopped  - c/w holding Metoprolol 12.5 q12h given hypotension. Will use Dig for rate control if RVR  - HR 90s-100s; goal <110    #CAD- Hx of MI and ischemic CM s/p AICD, STEMI 7/2017, was started on Aspirin and Brilinta per records from [place]  -aspirin 81  -Atorvastatin 80mg   -on hep gtt    #LE Edema   - LE edema with chronic venous stasis  - Duplex does not show DVT    PULMONARY   #Chronic Resp failure- S/p tracheostomy 4/5. Bedside US with simple b/l pleural effusions and atelectatic lung. Less concern for infectious source given b/l effusions with no septations/loculations. Likely related to fluid overload. If clinically worsening, can consider thoracentesis for fluid studies and cultures.  - c/w daily CPAP trials, weaning to trach collar as tolerated  - c/w HD to remove excess fluid    RENAL   #Chronic renal failure- likely ischemic ATN in setting of sepsis with low intravascular volume. Unknown baseline Cr presenting with Cr 3.93 with metabolic derangements. Renal team on board, now on intermittent HD. HD catheter removed 4/8 due to fungemia, temporary HD catheter placed 4/10.  Now s/p permacath replacement 4/12. RP US showed medical renal disease. permacath removed 4/19 and replaced with L subclavian HD cath  - Last dialyzed 4/19  - c/w intermittent HD per renal recs  -HD today 4/21    #Adrenal Insufficiency: BP improved immediately after hydrocortisone and able to wean off levophed  -c/w hydrocortisone 25 mg BID given marginal response to cortrysyn stimulation test    #Elevated AG- Fluctuating at low level; lactate negative, glucose has been well controlled, possibly 2/2 uremia in setting of ESRD.   - Monitor BMP    #Hyperkalemia- PT with intermittently elevated K,  no EKG changes.  - Continue telemetry monitoring  - Trend K   - c/w dialysis   - Kayexalate PRN    GI   #PEG in place: C/D/I  -c/w tube feeds (holding for IR procedure)    #Diarrhea likely 2/2 tube feeds, C.diff ruled out  -rectal tube in place    HEME  #Thrombocytopenia- ELVIRA negative but PF4 was positive. Unlikely HIT. Most likely 2/2 zosyn or sepsis.   -resolved  -monitor CBC    #Elevated INR. Unclear etiology.   - Given Vit K and FFP3/12. FFP 3/13, FFP 4/4  - Monitor coags    #Anemia- Likely 2/2 phlebotomy and CKD, no signs of bleeding. Prior studies showed Anemia of chronic disease. s/p 1pRBC on 3/12, 3/16 and 4/4  -monitor CBC    ENDOCRINE   #Diabetes: HbA1C 8.6  - feeds resumed  - regular insulin 10 units q6  - lantus 42 unit qhs  - MISS  - monitor FSG and adjust as needed     F: No IVF   E: Replete K<4 Mg<2, caution in setting of acute renal failure  N: PEG Placed 4/4, feeds changed to Vital 1.5 given diarrhea.   DVT ppx: on heparin drip  GI ppx: PPI 40 daily  Lines: L subclavian HD cath placed 4/19  Drips: none  Full code  Dispo: MICU

## 2018-04-22 LAB
ANION GAP SERPL CALC-SCNC: 19 MMOL/L — HIGH (ref 5–17)
APTT BLD: 60.3 SEC — HIGH (ref 27.5–37.4)
APTT BLD: 69.7 SEC — HIGH (ref 27.5–37.4)
APTT BLD: 82.5 SEC — HIGH (ref 27.5–37.4)
BUN SERPL-MCNC: 42 MG/DL — HIGH (ref 7–23)
CALCIUM SERPL-MCNC: 9.1 MG/DL — SIGNIFICANT CHANGE UP (ref 8.4–10.5)
CHLORIDE SERPL-SCNC: 98 MMOL/L — SIGNIFICANT CHANGE UP (ref 96–108)
CO2 SERPL-SCNC: 24 MMOL/L — SIGNIFICANT CHANGE UP (ref 22–31)
CREAT SERPL-MCNC: 2.9 MG/DL — HIGH (ref 0.5–1.3)
CULTURE RESULTS: SIGNIFICANT CHANGE UP
GLUCOSE BLDC GLUCOMTR-MCNC: 125 MG/DL — HIGH (ref 70–99)
GLUCOSE BLDC GLUCOMTR-MCNC: 140 MG/DL — HIGH (ref 70–99)
GLUCOSE BLDC GLUCOMTR-MCNC: 148 MG/DL — HIGH (ref 70–99)
GLUCOSE BLDC GLUCOMTR-MCNC: 155 MG/DL — HIGH (ref 70–99)
GLUCOSE BLDC GLUCOMTR-MCNC: 189 MG/DL — HIGH (ref 70–99)
GLUCOSE SERPL-MCNC: 105 MG/DL — HIGH (ref 70–99)
HCT VFR BLD CALC: 26.3 % — LOW (ref 39–50)
HGB BLD-MCNC: 7.8 G/DL — LOW (ref 13–17)
LYMPHOCYTES # BLD AUTO: 5.6 % — LOW (ref 13–44)
MAGNESIUM SERPL-MCNC: 2 MG/DL — SIGNIFICANT CHANGE UP (ref 1.6–2.6)
MCHC RBC-ENTMCNC: 29.3 PG — SIGNIFICANT CHANGE UP (ref 27–34)
MCHC RBC-ENTMCNC: 29.7 G/DL — LOW (ref 32–36)
MCV RBC AUTO: 98.9 FL — SIGNIFICANT CHANGE UP (ref 80–100)
MONOCYTES NFR BLD AUTO: 2.3 % — SIGNIFICANT CHANGE UP (ref 2–14)
NEUTROPHILS NFR BLD AUTO: 92.1 % — HIGH (ref 43–77)
PHOSPHATE SERPL-MCNC: 4.2 MG/DL — SIGNIFICANT CHANGE UP (ref 2.5–4.5)
PLATELET # BLD AUTO: 367 K/UL — SIGNIFICANT CHANGE UP (ref 150–400)
POTASSIUM SERPL-MCNC: 4.4 MMOL/L — SIGNIFICANT CHANGE UP (ref 3.5–5.3)
POTASSIUM SERPL-SCNC: 4.4 MMOL/L — SIGNIFICANT CHANGE UP (ref 3.5–5.3)
RBC # BLD: 2.66 M/UL — LOW (ref 4.2–5.8)
RBC # FLD: 18.7 % — HIGH (ref 10.3–16.9)
SODIUM SERPL-SCNC: 141 MMOL/L — SIGNIFICANT CHANGE UP (ref 135–145)
SPECIMEN SOURCE: SIGNIFICANT CHANGE UP
WBC # BLD: 21.6 K/UL — HIGH (ref 3.8–10.5)
WBC # FLD AUTO: 21.6 K/UL — HIGH (ref 3.8–10.5)

## 2018-04-22 PROCEDURE — 71045 X-RAY EXAM CHEST 1 VIEW: CPT | Mod: 26

## 2018-04-22 PROCEDURE — 99233 SBSQ HOSP IP/OBS HIGH 50: CPT | Mod: GC

## 2018-04-22 PROCEDURE — 99291 CRITICAL CARE FIRST HOUR: CPT

## 2018-04-22 PROCEDURE — 95951: CPT | Mod: 26

## 2018-04-22 PROCEDURE — 99232 SBSQ HOSP IP/OBS MODERATE 35: CPT | Mod: GC

## 2018-04-22 RX ORDER — MIDODRINE HYDROCHLORIDE 2.5 MG/1
15 TABLET ORAL EVERY 8 HOURS
Qty: 0 | Refills: 0 | Status: DISCONTINUED | OUTPATIENT
Start: 2018-04-22 | End: 2018-05-29

## 2018-04-22 RX ORDER — MIDODRINE HYDROCHLORIDE 2.5 MG/1
10 TABLET ORAL EVERY 8 HOURS
Qty: 0 | Refills: 0 | Status: DISCONTINUED | OUTPATIENT
Start: 2018-04-22 | End: 2018-04-22

## 2018-04-22 RX ORDER — LEVETIRACETAM 250 MG/1
1000 TABLET, FILM COATED ORAL ONCE
Qty: 0 | Refills: 0 | Status: COMPLETED | OUTPATIENT
Start: 2018-04-22 | End: 2018-04-22

## 2018-04-22 RX ADMIN — CEFTRIAXONE 2000 MILLIGRAM(S): 500 INJECTION, POWDER, FOR SOLUTION INTRAMUSCULAR; INTRAVENOUS at 17:53

## 2018-04-22 RX ADMIN — MODAFINIL 100 MILLIGRAM(S): 200 TABLET ORAL at 07:21

## 2018-04-22 RX ADMIN — LEVETIRACETAM 400 MILLIGRAM(S): 250 TABLET, FILM COATED ORAL at 09:43

## 2018-04-22 RX ADMIN — CHLORHEXIDINE GLUCONATE 1 APPLICATION(S): 213 SOLUTION TOPICAL at 06:22

## 2018-04-22 RX ADMIN — INSULIN HUMAN 2: 100 INJECTION, SOLUTION SUBCUTANEOUS at 05:57

## 2018-04-22 RX ADMIN — MODAFINIL 100 MILLIGRAM(S): 200 TABLET ORAL at 12:15

## 2018-04-22 RX ADMIN — Medication 25 MILLIGRAM(S): at 22:12

## 2018-04-22 RX ADMIN — Medication 1 PACKET(S): at 12:14

## 2018-04-22 RX ADMIN — INSULIN HUMAN 10 UNIT(S): 100 INJECTION, SOLUTION SUBCUTANEOUS at 06:00

## 2018-04-22 RX ADMIN — Medication 1 TABLET(S): at 12:15

## 2018-04-22 RX ADMIN — HEPARIN SODIUM 17 UNIT(S)/HR: 5000 INJECTION INTRAVENOUS; SUBCUTANEOUS at 22:15

## 2018-04-22 RX ADMIN — Medication 81 MILLIGRAM(S): at 12:15

## 2018-04-22 RX ADMIN — INSULIN GLARGINE 42 UNIT(S): 100 INJECTION, SOLUTION SUBCUTANEOUS at 22:14

## 2018-04-22 RX ADMIN — INSULIN HUMAN 10 UNIT(S): 100 INJECTION, SOLUTION SUBCUTANEOUS at 17:58

## 2018-04-22 RX ADMIN — MIDODRINE HYDROCHLORIDE 15 MILLIGRAM(S): 2.5 TABLET ORAL at 14:56

## 2018-04-22 RX ADMIN — Medication 1 MILLIGRAM(S): at 12:15

## 2018-04-22 RX ADMIN — FLUCONAZOLE 100 MILLIGRAM(S): 150 TABLET ORAL at 21:00

## 2018-04-22 RX ADMIN — Medication 25 MILLIGRAM(S): at 09:42

## 2018-04-22 RX ADMIN — ATORVASTATIN CALCIUM 80 MILLIGRAM(S): 80 TABLET, FILM COATED ORAL at 22:21

## 2018-04-22 RX ADMIN — HEPARIN SODIUM 17 UNIT(S)/HR: 5000 INJECTION INTRAVENOUS; SUBCUTANEOUS at 22:13

## 2018-04-22 RX ADMIN — CHLORHEXIDINE GLUCONATE 15 MILLILITER(S): 213 SOLUTION TOPICAL at 09:42

## 2018-04-22 RX ADMIN — CHLORHEXIDINE GLUCONATE 15 MILLILITER(S): 213 SOLUTION TOPICAL at 22:21

## 2018-04-22 RX ADMIN — MIDODRINE HYDROCHLORIDE 15 MILLIGRAM(S): 2.5 TABLET ORAL at 22:12

## 2018-04-22 RX ADMIN — PANTOPRAZOLE SODIUM 40 MILLIGRAM(S): 20 TABLET, DELAYED RELEASE ORAL at 12:15

## 2018-04-22 RX ADMIN — Medication 100 MILLIGRAM(S): at 12:15

## 2018-04-22 RX ADMIN — ESCITALOPRAM OXALATE 5 MILLIGRAM(S): 10 TABLET, FILM COATED ORAL at 12:18

## 2018-04-22 RX ADMIN — INSULIN HUMAN 10 UNIT(S): 100 INJECTION, SOLUTION SUBCUTANEOUS at 00:49

## 2018-04-22 RX ADMIN — INSULIN HUMAN 10 UNIT(S): 100 INJECTION, SOLUTION SUBCUTANEOUS at 12:14

## 2018-04-22 RX ADMIN — MIDODRINE HYDROCHLORIDE 10 MILLIGRAM(S): 2.5 TABLET ORAL at 07:20

## 2018-04-22 RX ADMIN — ERGOCALCIFEROL 6000 UNIT(S): 1.25 CAPSULE ORAL at 12:15

## 2018-04-22 RX ADMIN — MIDODRINE HYDROCHLORIDE 10 MILLIGRAM(S): 2.5 TABLET ORAL at 00:53

## 2018-04-22 RX ADMIN — HEPARIN SODIUM 16 UNIT(S)/HR: 5000 INJECTION INTRAVENOUS; SUBCUTANEOUS at 07:20

## 2018-04-22 NOTE — PROGRESS NOTE ADULT - SUBJECTIVE AND OBJECTIVE BOX
Seen in the ICU. s/p tracheostomy, poor mental status. Does not follow consistently commands Last HD done on 4/21 - 1.5 liters removed. This morning with seizure activity - on EEG     Renal service for [facility] – ATN – from cardiogenic shock       acetaminophen    Suspension 650 milliGRAM(s) every 6 hours PRN  acetaminophen    Suspension. 650 milliGRAM(s) every 6 hours PRN  aspirin  chewable 81 milliGRAM(s) daily  atorvastatin 80 milliGRAM(s) at bedtime  cefTRIAXone Injectable. 2000 milliGRAM(s) every 24 hours  chlorhexidine 0.12% Liquid 15 milliLiter(s) two times a day  chlorhexidine 2% Cloths 1 Application(s) daily  dextrose 5%. 1000 milliLiter(s) <Continuous>  dextrose 50% Injectable 12.5 Gram(s) once  dextrose 50% Injectable 25 Gram(s) once  dextrose 50% Injectable 25 Gram(s) once  dextrose Gel 1 Dose(s) once PRN  ergocalciferol Drops 6000 Unit(s) daily  escitalopram 5 milliGRAM(s) daily  fluconAZOLE IVPB 200 milliGRAM(s) every 24 hours  folic acid 1 milliGRAM(s) daily  glucagon  Injectable 1 milliGRAM(s) once PRN  heparin  Infusion 1600 Unit(s)/Hr <Continuous>  hydrocortisone sodium succinate Injectable 25 milliGRAM(s) every 12 hours  insulin glargine Injectable (LANTUS) 42 Unit(s) at bedtime  insulin regular  human corrective regimen sliding scale   every 6 hours  insulin regular  human recombinant 10 Unit(s) every 6 hours  midodrine 15 milliGRAM(s) every 8 hours  modafinil 100 milliGRAM(s) <User Schedule>  multivitamin 1 Tablet(s) daily  pantoprazole   Suspension 40 milliGRAM(s) daily  psyllium Powder 1 Packet(s) daily  thiamine 100 milliGRAM(s) daily      Allergies    No Known Allergies    Intolerances        T(C): , Max: 37.3 (04-21-18 @ 22:12)  T(F): , Max: 99.2 (04-21-18 @ 22:12)  HR: 84 (04-22-18 @ 11:00)  BP: 83/63 (04-22-18 @ 11:00)  BP(mean): 68 (04-22-18 @ 11:00)  RR: 18 (04-22-18 @ 11:00)  SpO2: 100% (04-22-18 @ 11:00)  Wt(kg): --    04-21 @ 07:01  -  04-22 @ 07:00  --------------------------------------------------------  IN:    Enteral Tube Flush: 100 mL    heparin Infusion: 68 mL    heparin Infusion: 128 mL    heparin Infusion: 144 mL    Oral Fluid: 100 mL    Vital HN: 1260 mL  Total IN: 1800 mL    OUT:    Other: 1500 mL    Rectal Tube: 250 mL  Total OUT: 1750 mL    Total NET: 50 mL      04-22 @ 07:01  -  04-22 @ 12:07  --------------------------------------------------------  IN:    heparin Infusion: 64 mL    Solution: 100 mL    Vital HN: 252 mL  Total IN: 416 mL    OUT:  Total OUT: 0 mL    Total NET: 416 mL    Physical exam:   Does not follow commands consistently   Cachectic   S/p tracheostomy   Normal air entry into the lungs, no wheezing, no crackles   RRR, normal s1/s2, no murmurs, rubs or gallops   Abdomen – soft, not tender, not distended   Extremities: no edema,   Perm cath on the right of his chest           LABS:                        7.8    21.6  )-----------( 367      ( 22 Apr 2018 04:47 )             26.3     04-22    141  |  98  |  42<H>  ----------------------------<  105<H>  4.4   |  24  |  2.90<H>    Ca    9.1      22 Apr 2018 04:47  Phos  4.2     04-22  Mg     2.0     04-22        PTT - ( 22 Apr 2018 11:19 )  PTT:60.3 sec          RADIOLOGY & ADDITIONAL STUDIES:

## 2018-04-22 NOTE — PROGRESS NOTE ADULT - SUBJECTIVE AND OBJECTIVE BOX
This morning had episode of vigorous left arm shaking. EEG did not clearly show seizure, but video reviewed and it was very suggestive of seizure clinically. There were also other parts of the EEG record that demonstrated right fronto-temporal sharp waves what would correspond to that area of the body. Keppra 1000mg was given.     Currently mental status is improved - eyes open, alert, follows commands briskly, moves eyes to the left and right on command, attempts to show two fingers with his right hand, moves fingers of left hand and toes on both sides minimally to command. Still has some twitching movements of lips / chin and right fingers.

## 2018-04-22 NOTE — PROGRESS NOTE ADULT - SUBJECTIVE AND OBJECTIVE BOX
PGY1 PROGRESS NOTE:    OVERNIGHT EVENTS: aPTT was at goal at 10pm (Higher side-reduced by 1ml/hr) 4:30am aPTT at goal. At around 1am BP was noticed to be low MAP at around 56. Midodrine noticed to have been DC'd. Restarted at 10mg q8h. Since then MAP >65.    SUBJECTIVE: Patient seen and examined at bedside.     VITAL SIGNS:  Vital Signs Last 24 Hrs  T(C): 37.1 (22 Apr 2018 01:12), Max: 37.4 (21 Apr 2018 06:14)  T(F): 98.8 (22 Apr 2018 01:12), Max: 99.3 (21 Apr 2018 06:14)  HR: 108 (22 Apr 2018 05:36) (90 - 112)  BP: 93/67 (22 Apr 2018 05:00) (67/48 - 102/68)  BP(mean): 74 (22 Apr 2018 05:00) (52 - 79)  RR: 18 (22 Apr 2018 05:36) (10 - 72)  SpO2: 100% (22 Apr 2018 05:36) (99% - 100%)      PHYSICAL EXAM:  General: NAD, no respiratory distress on pressure support, cachectic, chronically ill-appearing   HEENT: NC/AT; PERRL, clear conjunctiva, MM dry  Neck: supple; trach in place; blood noted under L HD cath dressing  Respiratory: rhonchi b/l, upper airway secretions, + wheezing, no retractions or labored breathing  Cardiovascular: +S1/S2; RRR  Abdomen: soft, mildly distended and tympanic to percussion, nontender, +BS, PEG in place C/D/I  Extremities: WWP, 1+ pitting edema to level of thigh b/l  Vasc: 2+ peripheral pulses b/l  Skin: dry, b/l LE with peeling dry skin, wrinkled skin of b/l feet  Neurological: nonverbal, not following commands, RUE flexed and rigid with tremor, LUE rigid but w/o spontaneous movement, no spontaneous movement of b/l LE, alert and awake    MEDICATIONS:  MEDICATIONS  (STANDING):  aspirin  chewable 81 milliGRAM(s) Oral daily  atorvastatin 80 milliGRAM(s) Oral at bedtime  cefTRIAXone Injectable. 2000 milliGRAM(s) IV Push every 24 hours  chlorhexidine 0.12% Liquid 15 milliLiter(s) Swish and Spit two times a day  chlorhexidine 2% Cloths 1 Application(s) Topical daily  dextrose 5%. 1000 milliLiter(s) (50 mL/Hr) IV Continuous <Continuous>  dextrose 50% Injectable 12.5 Gram(s) IV Push once  dextrose 50% Injectable 25 Gram(s) IV Push once  dextrose 50% Injectable 25 Gram(s) IV Push once  ergocalciferol Drops 6000 Unit(s) Oral daily  escitalopram 5 milliGRAM(s) Oral daily  fluconAZOLE IVPB 200 milliGRAM(s) IV Intermittent every 24 hours  folic acid 1 milliGRAM(s) Oral daily  heparin  Infusion 1600 Unit(s)/Hr (16 mL/Hr) IV Continuous <Continuous>  hydrocortisone sodium succinate Injectable 25 milliGRAM(s) IV Push every 12 hours  insulin glargine Injectable (LANTUS) 42 Unit(s) SubCutaneous at bedtime  insulin regular  human corrective regimen sliding scale   SubCutaneous every 6 hours  insulin regular  human recombinant 10 Unit(s) SubCutaneous every 6 hours  modafinil 100 milliGRAM(s) Oral <User Schedule>  multivitamin 1 Tablet(s) Oral daily  pantoprazole   Suspension 40 milliGRAM(s) Oral daily  psyllium Powder 1 Packet(s) Oral daily  thiamine 100 milliGRAM(s) Oral daily    MEDICATIONS  (PRN):  acetaminophen    Suspension 650 milliGRAM(s) Oral every 6 hours PRN For Temp greater than 38 C (100.4 F)  acetaminophen    Suspension. 650 milliGRAM(s) Oral every 6 hours PRN Moderate Pain (4 - 6)  dextrose Gel 1 Dose(s) Oral once PRN Blood Glucose LESS THAN 70 milliGRAM(s)/deciliter  glucagon  Injectable 1 milliGRAM(s) IntraMuscular once PRN Glucose LESS THAN 70 milligrams/deciliter      ALLERGIES:  No Known Allergies      LABS:              RADIOLOGY & ADDITIONAL TESTS: Reviewed.    63M PMH HFrEF 10-15% (ischemic), MI, s/p AICD vs PPM, possible Afib, HTN, DM2 on insulin, possible CKD, and gout, who presented with a chief complaint of generalized weakness s/p septic shock likely 2/2 LE cellulitis complicated by ATN requiring dialysis. Now with AMS likely from brainstem infarct and/or toxic metabolic encephalopathy, now with candidemia (resolving) and sepsis 2/2 klebsiella bacteremia. He was intiially treated with meropenem, then switched to zosyn, then finally to ceftriaxone when sensitivities were available. Hydrocortisone was discontinued due to infection. Patient has continued to have low grade temperatures and intermittently required levophed, hydrocortisone restarted on 4/17.    NEURO  #Toxic Metabolic Encephalopathy- Likely 2/2 acute infarct on imaging; Patient acutely unresponsive since 3/22 and has had waxing/ waning mental status since. CT, CTA negative. VEEG w/ no seizure activity. Decline in mental status likely 2/2 to underlying infection vs acute neurologic event. 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 Per neurology may have had ischemic event from hypoperfusion. Also showing disc protrusion at C3/C4 level coursing superiorly to the C2/C3 contacting the ventral spinal cord. Per neurology, this may be contributing to weakness, however would not explain change in mental status.  S/p PEG/trach on 4/4. Repeat CT head 4/11 performed due to concern for not moving LUE, showing only chronic infarctions.  - C/w Modafinil  - CT C-spine w/o contrast showing multilevel degenerative changes with mod-severe foraminal narrowing @ C3-C4  - per neuro, no acute interventions   - patient likely at new baseline mental status  - VEEG ordered r/o seizures    ID  #Klebsiella Bacteremia- Urine, blood and sputum cx growing klebsiella. S/p Zosyn 4/14-4/15. S/p Meropenem 4/13. Sensitive to ceftriaxone.  -C/w Ceftriaxone 2g q 24 hrs (4/15-4/26)  -surveillance blood cultures NGTD   -f/u ID recs  -s/p permacath removal 4/19; cath tip negative growth     #Candidemia with candida tropicalis- Blood cultures positive for candida tropicalis on 4/7 and again on 4/9. No source identified. CTAP 4/11 negative for abscess. RUKHSANA negative for vegetations/ infection of AICD; Tunelled HD catheter removed 4/8, temp HD placed 4/10, permanent HD cath placed 4/12  - c/w Fluconazole 200mg q 24hrs (4/13-4/24)  - Surveillance fungal cultures 4/10 NGTD  - ID following, f/u recs  - permacath removed 4/19    #UTI- urine culture from 4/11 growing klebsiella sensitive to ceftriaxone, and now bacteremia with GNR likely 2/2 klebsiella (in urine and lung).  -c/w management as per above    CARDIOVASCULAR   # HYPOTENSION-now off levophed  - c/w Midodrine 15 mg q8h to maintain SBP>65  - c/w hydrocortisone 25mg BID    # CHF exacerbation- CHF with EF 10-15% per pt 2/2 ischemic cardiomyopathy s/p AICD. Elevated BNP on admission with R sided effusion and edema. Patient with persistent pleural effusions on CXR and US and b/l dependent edema.   - Maintain negative fluid balance with dialysis  - c/w holding ACE/BB in setting of sepsis/ hypotension on midodrine    #AFIB- hx of Afib and LV thrombus on coumadin prior to admission; TTE with definity shows no LV thrombus currently   - c/w Heparin gtt as oozing from L cath site stopped  - c/w holding Metoprolol 12.5 q12h given hypotension. Will use Dig for rate control if RVR  - HR 90s-100s; goal <110    #CAD- Hx of MI and ischemic CM s/p AICD, STEMI 7/2017, was started on Aspirin and Brilinta per records from [place]  -aspirin 81  -Atorvastatin 80mg   -on hep gtt    #LE Edema   - LE edema with chronic venous stasis  - Duplex does not show DVT    PULMONARY   #Chronic Resp failure- S/p tracheostomy 4/5. Bedside US with simple b/l pleural effusions and atelectatic lung. Less concern for infectious source given b/l effusions with no septations/loculations. Likely related to fluid overload. If clinically worsening, can consider thoracentesis for fluid studies and cultures.  - c/w daily CPAP trials, weaning to trach collar as tolerated  - c/w HD to remove excess fluid    RENAL   #Chronic renal failure- likely ischemic ATN in setting of sepsis with low intravascular volume. Unknown baseline Cr presenting with Cr 3.93 with metabolic derangements. Renal team on board, now on intermittent HD. HD catheter removed 4/8 due to fungemia, temporary HD catheter placed 4/10.  Now s/p permacath replacement 4/12. RP US showed medical renal disease. permacath removed 4/19 and replaced with L subclavian HD cath  - Last dialyzed 4/19  - c/w intermittent HD per renal recs  -HD today 4/21    #Adrenal Insufficiency: BP improved immediately after hydrocortisone and able to wean off levophed  -c/w hydrocortisone 25 mg BID given marginal response to cortrysyn stimulation test    #Elevated AG- Fluctuating at low level; lactate negative, glucose has been well controlled, possibly 2/2 uremia in setting of ESRD.   - Monitor BMP    #Hyperkalemia- PT with intermittently elevated K,  no EKG changes.  - Continue telemetry monitoring  - Trend K   - c/w dialysis   - Kayexalate PRN    GI   #PEG in place: C/D/I  -c/w tube feeds (holding for IR procedure)    #Diarrhea likely 2/2 tube feeds, C.diff ruled out  -rectal tube in place    HEME  #Thrombocytopenia- ELVIRA negative but PF4 was positive. Unlikely HIT. Most likely 2/2 zosyn or sepsis.   -resolved  -monitor CBC    #Elevated INR. Unclear etiology.   - Given Vit K and FFP3/12. FFP 3/13, FFP 4/4  - Monitor coags    #Anemia- Likely 2/2 phlebotomy and CKD, no signs of bleeding. Prior studies showed Anemia of chronic disease. s/p 1pRBC on 3/12, 3/16 and 4/4  -monitor CBC    ENDOCRINE   #Diabetes: HbA1C 8.6  - feeds resumed  - regular insulin 10 units q6  - lantus 42 unit qhs  - MISS  - monitor FSG and adjust as needed     F: No IVF   E: Replete K<4 Mg<2, caution in setting of acute renal failure  N: PEG Placed 4/4, feeds changed to Vital 1.5 given diarrhea.   DVT ppx: on heparin drip  GI ppx: PPI 40 daily  Lines: L subclavian HD cath placed 4/19  Drips: none  Full code  Dispo: MICU PGY1 PROGRESS NOTE:    OVERNIGHT EVENTS: aPTT at 10pm was slightly elevated, therefore adjusted hep gt by 1ml/hr. Repeat aPTT at 4:30am aPTT at goal. At around 1am BP was noticed to be low MAP at around 56. Midodrine noticed to have been DC'd, restarted at 10mg q8h with MAP >65.    SUBJECTIVE: Patient seen and examined at bedside. Patient laying positioned on R side and tremulous. Following commands with eye movements but otherwise not following commands. Unable to assess ROS.    VITAL SIGNS:  Vital Signs Last 24 Hrs  T(C): 37.1 (22 Apr 2018 01:12), Max: 37.4 (21 Apr 2018 06:14)  T(F): 98.8 (22 Apr 2018 01:12), Max: 99.3 (21 Apr 2018 06:14)  HR: 108 (22 Apr 2018 05:36) (90 - 112)  BP: 93/67 (22 Apr 2018 05:00) (67/48 - 102/68)  BP(mean): 74 (22 Apr 2018 05:00) (52 - 79)  RR: 18 (22 Apr 2018 05:36) (10 - 72)  SpO2: 100% (22 Apr 2018 05:36) (99% - 100%)      PHYSICAL EXAM:  General: NAD, tremulous throughout appears in mild distress, cachectic, chronically ill-appearing   HEENT: NC/AT, PERRL, lacrimation in L eye, MM dry  Neck: supple, trach in place with some mucoid secretions, no blood at L HD catheter  Respiratory: coarse breath sounds, upper airway secretions, no wheezing, no retractions or labored breathing  Cardiovascular: tachycardic, +S1/S2, RRR, no MRG  Abdomen: soft, mildly distended and tympanic to percussion, nontender, +BS, PEG in place C/D/I  Extremities: WWP, 1+ pitting edema to level of thigh b/l  Vasc: 2+ peripheral pulses b/l  Skin: dry, b/l LE with peeling skin  Neurological: nonverbal, following commands to eye movement, RUE flexed and rigid, LUE rigid, no spontaneous movement of b/l LE, alert and awake    MEDICATIONS:  MEDICATIONS  (STANDING):  aspirin  chewable 81 milliGRAM(s) Oral daily  atorvastatin 80 milliGRAM(s) Oral at bedtime  cefTRIAXone Injectable. 2000 milliGRAM(s) IV Push every 24 hours  chlorhexidine 0.12% Liquid 15 milliLiter(s) Swish and Spit two times a day  chlorhexidine 2% Cloths 1 Application(s) Topical daily  dextrose 5%. 1000 milliLiter(s) (50 mL/Hr) IV Continuous <Continuous>  dextrose 50% Injectable 12.5 Gram(s) IV Push once  dextrose 50% Injectable 25 Gram(s) IV Push once  dextrose 50% Injectable 25 Gram(s) IV Push once  ergocalciferol Drops 6000 Unit(s) Oral daily  escitalopram 5 milliGRAM(s) Oral daily  fluconAZOLE IVPB 200 milliGRAM(s) IV Intermittent every 24 hours  folic acid 1 milliGRAM(s) Oral daily  heparin  Infusion 1600 Unit(s)/Hr (16 mL/Hr) IV Continuous <Continuous>  hydrocortisone sodium succinate Injectable 25 milliGRAM(s) IV Push every 12 hours  insulin glargine Injectable (LANTUS) 42 Unit(s) SubCutaneous at bedtime  insulin regular  human corrective regimen sliding scale   SubCutaneous every 6 hours  insulin regular  human recombinant 10 Unit(s) SubCutaneous every 6 hours  modafinil 100 milliGRAM(s) Oral <User Schedule>  multivitamin 1 Tablet(s) Oral daily  pantoprazole   Suspension 40 milliGRAM(s) Oral daily  psyllium Powder 1 Packet(s) Oral daily  thiamine 100 milliGRAM(s) Oral daily    MEDICATIONS  (PRN):  acetaminophen    Suspension 650 milliGRAM(s) Oral every 6 hours PRN For Temp greater than 38 C (100.4 F)  acetaminophen    Suspension. 650 milliGRAM(s) Oral every 6 hours PRN Moderate Pain (4 - 6)  dextrose Gel 1 Dose(s) Oral once PRN Blood Glucose LESS THAN 70 milliGRAM(s)/deciliter  glucagon  Injectable 1 milliGRAM(s) IntraMuscular once PRN Glucose LESS THAN 70 milligrams/deciliter      ALLERGIES:  No Known Allergies      LABS:                              7.8    21.6  )-----------( 367      ( 22 Apr 2018 04:47 )             26.3     04-22    141  |  98  |  42<H>  ----------------------------<  105<H>  4.4   |  24  |  2.90<H>    Ca    9.1      22 Apr 2018 04:47  Phos  4.2     04-22  Mg     2.0     04-22 04-21-18 @ 07:01  -  04-22-18 @ 07:00  --------------------------------------------------------  IN: 1800 mL / OUT: 1750 mL / NET: 50 mL    04-22-18 @ 07:01  -  04-22-18 @ 08:30  --------------------------------------------------------  IN: 79 mL / OUT: 0 mL / NET: 79 mL        RADIOLOGY & ADDITIONAL TESTS: Reviewed.    63M PMH HFrEF 10-15% (ischemic), MI, s/p AICD vs PPM, possible Afib, HTN, DM2 on insulin, possible CKD, and gout, who presented with a chief complaint of generalized weakness s/p septic shock likely 2/2 LE cellulitis complicated by ATN requiring dialysis. Now with AMS likely from brainstem infarct and/or toxic metabolic encephalopathy, now with candidemia (resolving) and sepsis 2/2 klebsiella bacteremia. He was intiially treated with meropenem, then IN: switched to zosyn, then finally to ceftriaxone when sensitivities were available. Hydrocortisone was discontinued due to infection. Patient has continued to have low grade temperatures and intermittently required levophed, hydrocortisone restarted on 4/17.    NEURO  #Toxic Metabolic Encephalopathy- Likely 2/2 acute infarct on imaging; Patient acutely unresponsive since 3/22 and has had waxing/ waning mental status since. CT, CTA negative. VEEG w/ no seizure activity. Decline in mental status likely 2/2 to underlying infection vs acute neurologic event. 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 Per neurology may have had ischemic event from hypoperfusion. Also showing disc protrusion at C3/C4 level coursing superiorly to the C2/C3 contacting the ventral spinal cord. Per neurology, this may be contributing to weakness, however would not explain change in mental status.  S/p PEG/trach on 4/4. Repeat CT head 4/11 performed due to concern for not moving LUE, showing only chronic infarctions.  - C/w Modafinil  - CT C-spine w/o contrast showing multilevel degenerative changes with mod-severe foraminal narrowing @ C3-C4  - per neuro, no acute interventions   - patient likely at new baseline mental status  - VEEG ordered r/o seizures    ID  #Klebsiella Bacteremia- Urine, blood and sputum cx growing klebsiella. S/p Zosyn 4/14-4/15. S/p Meropenem 4/13. Sensitive to ceftriaxone.  -C/w Ceftriaxone 2g q 24 hrs (4/15-4/26)  -surveillance blood cultures NGTD   - f/u ID recs  - s/p permacath removal 4/19; cath tip negative growth     #Candidemia with candida tropicalis- Blood cultures positive for candida tropicalis on 4/7 and again on 4/9. No source identified. CTAP 4/11 negative for abscess. RUKHSANA negative for vegetations/ infection of AICD; Tunelled HD catheter removed 4/8, temp HD placed 4/10, permanent HD cath placed 4/12  - c/w Fluconazole 200mg q 24hrs (4/13-4/24)  - Surveillance fungal cultures 4/10 NGTD  - permacath removed 4/19    #UTI- urine culture from 4/11 growing klebsiella sensitive to ceftriaxone, and now bacteremia with GNR likely 2/2 klebsiella (in urine and lung).  - c/w management as per above    CARDIOVASCULAR   # Hypotension- now off levophed  - c/w Midodrine 15 mg q8h to maintain SBP>65  - c/w hydrocortisone 25mg BID    # CHF with EF 10-15% 2/2 ischemic cardiomyopathy s/p AICD. Elevated BNP on admission with R sided effusion and edema. Patient with persistent pleural effusions on CXR and US and b/l dependent edema.   - Maintain negative fluid balance with dialysis  - c/w holding ACE/BB in setting of sepsis/ hypotension on midodrine    #AFIB- hx of Afib and LV thrombus on coumadin prior to admission; TTE with definity shows no LV thrombus currently   - c/w Heparin gtt  - c/w holding Metoprolol 12.5 q12h given hypotension. Will use Dig for rate control if RVR  - HR 90s-100s; goal <110    #CAD- Hx of MI and ischemic CM s/p AICD, STEMI 7/2017, was started on Aspirin and Brilinta per records from [place]  - c/w aspirin 81 and Atorvastatin 80mg qhs.     #LE Edema- LE edema with chronic venous stasis. Duplex does not show DVT    PULMONARY   #Chronic Resp failure- S/p tracheostomy 4/5. Bedside US with simple b/l pleural effusions and atelectatic lung. Less concern for infectious source given b/l effusions with no septations/loculations. Likely related to fluid overload. If clinically worsening, can consider thoracentesis for fluid studies and cultures.  - c/w daily CPAP trials, weaning to trach collar as tolerated  - c/w HD to remove excess fluid    RENAL   #Chronic renal failure- likely ischemic ATN in setting of sepsis with low intravascular volume. Unknown baseline Cr presenting with Cr 3.93 with metabolic derangements. Renal team on board, now on intermittent HD. HD catheter removed 4/8 due to fungemia, now with Permacath removed 4/19 and replaced with L subclavian HD cath.   - Last HD 4/21, c/w intermittent HD per renal recs    #Adrenal Insufficiency: BP improved immediately after hydrocortisone and able to wean off levophed  -c/w hydrocortisone 25 mg BID given marginal response to cortrysyn stimulation test    #Elevated AG- Fluctuating at low level; lactate negative, glucose has been well controlled, possibly 2/2 uremia in setting of ESRD.   - Monitor BMP    #Hyperkalemia- PT with intermittently elevated K,  no EKG changes.  - Continue telemetry monitoring  - Trend K   - c/w dialysis   - Kayexalate PRN    GI   #PEG in place: C/D/I  -c/w tube feeds (holding for IR procedure)    #Diarrhea likely 2/2 tube feeds, C.diff ruled out  -rectal tube in place    HEME  #Thrombocytopenia- ELVIRA negative but PF4 was positive. Unlikely HIT. Most likely 2/2 zosyn or sepsis.   -resolved  -monitor CBC    #Elevated INR. Unclear etiology.   - Given Vit K and FFP3/12. FFP 3/13, FFP 4/4  - Monitor coags    #Anemia- Likely 2/2 phlebotomy and CKD, no signs of bleeding. Prior studies showed Anemia of chronic disease. s/p 1pRBC on 3/12, 3/16 and 4/4  -monitor CBC    ENDOCRINE   #Diabetes: HbA1C 8.6  - feeds resumed  - regular insulin 10 units q6  - lantus 42 unit qhs  - MISS  - monitor FSG and adjust as needed     F: No IVF   E: Replete K<4 Mg<2, caution in setting of acute renal failure  N: PEG Placed 4/4, feeds changed to Vital 1.5 given diarrhea.   DVT ppx: on heparin drip  GI ppx: PPI 40 daily  Lines: L subclavian HD cath placed 4/19  Drips: none  Full code  Dispo: MICU PGY1 PROGRESS NOTE:    OVERNIGHT EVENTS: aPTT at 10pm was slightly elevated, therefore adjusted hep gt by 1ml/hr. Repeat aPTT at 4:30am aPTT at goal. At around 1am BP was noticed to be low MAP at around 56. Midodrine noticed to have been DC'd, restarted at 10mg q8h with MAP >65.    SUBJECTIVE: Patient seen and examined at bedside. Patient laying positioned on R side and tremulous. Following commands with eye movements but otherwise not following commands. Unable to assess ROS.    VITAL SIGNS:  Vital Signs Last 24 Hrs  T(C): 37.1 (22 Apr 2018 01:12), Max: 37.4 (21 Apr 2018 06:14)  T(F): 98.8 (22 Apr 2018 01:12), Max: 99.3 (21 Apr 2018 06:14)  HR: 108 (22 Apr 2018 05:36) (90 - 112)  BP: 93/67 (22 Apr 2018 05:00) (67/48 - 102/68)  BP(mean): 74 (22 Apr 2018 05:00) (52 - 79)  RR: 18 (22 Apr 2018 05:36) (10 - 72)  SpO2: 100% (22 Apr 2018 05:36) (99% - 100%)      PHYSICAL EXAM:  General: NAD, tremulous throughout appears in mild distress, cachectic, chronically ill-appearing   HEENT: NC/AT, PERRL, lacrimation in L eye, MM dry  Neck: supple, trach in place with some mucoid secretions, no blood at L HD catheter  Respiratory: coarse breath sounds, upper airway secretions, no wheezing, no retractions or labored breathing  Cardiovascular: tachycardic, +S1/S2, RRR, no MRG  Abdomen: soft, mildly distended and tympanic to percussion, nontender, +BS, PEG in place C/D/I  Extremities: WWP, 1+ pitting edema to level of thigh b/l  Vasc: 2+ peripheral pulses b/l  Skin: dry, b/l LE with peeling skin  Neurological: nonverbal, following commands to eye movement, RUE flexed and rigid, LUE rigid, no spontaneous movement of b/l LE, alert and awake    MEDICATIONS:  MEDICATIONS  (STANDING):  aspirin  chewable 81 milliGRAM(s) Oral daily  atorvastatin 80 milliGRAM(s) Oral at bedtime  cefTRIAXone Injectable. 2000 milliGRAM(s) IV Push every 24 hours  chlorhexidine 0.12% Liquid 15 milliLiter(s) Swish and Spit two times a day  chlorhexidine 2% Cloths 1 Application(s) Topical daily  dextrose 5%. 1000 milliLiter(s) (50 mL/Hr) IV Continuous <Continuous>  dextrose 50% Injectable 12.5 Gram(s) IV Push once  dextrose 50% Injectable 25 Gram(s) IV Push once  dextrose 50% Injectable 25 Gram(s) IV Push once  ergocalciferol Drops 6000 Unit(s) Oral daily  escitalopram 5 milliGRAM(s) Oral daily  fluconAZOLE IVPB 200 milliGRAM(s) IV Intermittent every 24 hours  folic acid 1 milliGRAM(s) Oral daily  heparin  Infusion 1600 Unit(s)/Hr (16 mL/Hr) IV Continuous <Continuous>  hydrocortisone sodium succinate Injectable 25 milliGRAM(s) IV Push every 12 hours  insulin glargine Injectable (LANTUS) 42 Unit(s) SubCutaneous at bedtime  insulin regular  human corrective regimen sliding scale   SubCutaneous every 6 hours  insulin regular  human recombinant 10 Unit(s) SubCutaneous every 6 hours  modafinil 100 milliGRAM(s) Oral <User Schedule>  multivitamin 1 Tablet(s) Oral daily  pantoprazole   Suspension 40 milliGRAM(s) Oral daily  psyllium Powder 1 Packet(s) Oral daily  thiamine 100 milliGRAM(s) Oral daily    MEDICATIONS  (PRN):  acetaminophen    Suspension 650 milliGRAM(s) Oral every 6 hours PRN For Temp greater than 38 C (100.4 F)  acetaminophen    Suspension. 650 milliGRAM(s) Oral every 6 hours PRN Moderate Pain (4 - 6)  dextrose Gel 1 Dose(s) Oral once PRN Blood Glucose LESS THAN 70 milliGRAM(s)/deciliter  glucagon  Injectable 1 milliGRAM(s) IntraMuscular once PRN Glucose LESS THAN 70 milligrams/deciliter      ALLERGIES:  No Known Allergies      LABS:                              7.8    21.6  )-----------( 367      ( 22 Apr 2018 04:47 )             26.3     04-22    141  |  98  |  42<H>  ----------------------------<  105<H>  4.4   |  24  |  2.90<H>    Ca    9.1      22 Apr 2018 04:47  Phos  4.2     04-22  Mg     2.0     04-22 04-21-18 @ 07:01  -  04-22-18 @ 07:00  --------------------------------------------------------  IN: 1800 mL / OUT: 1750 mL / NET: 50 mL    04-22-18 @ 07:01  -  04-22-18 @ 08:30  --------------------------------------------------------  IN: 79 mL / OUT: 0 mL / NET: 79 mL        RADIOLOGY & ADDITIONAL TESTS: Reviewed.    63M PMH HFrEF 10-15% (ischemic), MI, s/p AICD vs PPM, possible Afib, HTN, DM2 on insulin, possible CKD, and gout, who presented with a chief complaint of generalized weakness s/p septic shock likely 2/2 LE cellulitis complicated by ATN requiring dialysis. Now with AMS likely from brainstem infarct and/or toxic metabolic encephalopathy, now with candidemia (resolving) and sepsis 2/2 klebsiella bacteremia. He was intiially treated with meropenem, then IN: switched to zosyn, then finally to ceftriaxone when sensitivities were available. Hydrocortisone was discontinued due to infection. Patient has continued to have low grade temperatures and intermittently required levophed, hydrocortisone restarted on 4/17.    NEURO  #Toxic Metabolic Encephalopathy- Likely 2/2 acute infarct on imaging; Patient acutely unresponsive since 3/22 and has had waxing/ waning mental status since. CT, CTA negative. VEEG w/ no seizure activity. Decline in mental status likely 2/2 to underlying infection vs acute neurologic event. 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 Per neurology may have had ischemic event from hypoperfusion. Also showing disc protrusion at C3/C4 level coursing superiorly to the C2/C3 contacting the ventral spinal cord. Per neurology, this may be contributing to weakness, however would not explain change in mental status.  S/p PEG/trach on 4/4. Repeat CT head 4/11 performed due to concern for not moving LUE, showing only chronic infarctions.  - C/w Modafinil  - CT C-spine w/o contrast showing multilevel degenerative changes with mod-severe foraminal narrowing @ C3-C4  - per neuro, no acute interventions   - patient likely at new baseline mental status    #Seizures- possible seizure activity seen on VEEG this morning. Started on Keppra 1000mg after HD.  - VEEG and f/u epilepsy recommendations    ID  #Klebsiella Bacteremia- Urine, blood and sputum cx growing klebsiella. S/p Zosyn 4/14-4/15. S/p Meropenem 4/13. Sensitive to ceftriaxone.  -C/w Ceftriaxone 2g q 24 hrs (4/15-4/26)  -surveillance blood cultures NGTD   - f/u ID recs  - s/p permacath removal 4/19; cath tip negative growth     #Candidemia with candida tropicalis- Blood cultures positive for candida tropicalis on 4/7 and again on 4/9. No source identified. CTAP 4/11 negative for abscess. RUKHSANA negative for vegetations/ infection of AICD; Tunelled HD catheter removed 4/8, temp HD placed 4/10, permanent HD cath placed 4/12  - c/w Fluconazole 200mg q 24hrs (4/13-4/24)  - Surveillance fungal cultures 4/10 NGTD  - permacath removed 4/19    #UTI- urine culture from 4/11 growing klebsiella sensitive to ceftriaxone, and now bacteremia with GNR likely 2/2 klebsiella (in urine and lung).  - c/w management as per above    CARDIOVASCULAR   # Hypotension- now off levophed  - c/w Midodrine 15 mg q8h to maintain SBP>65  - c/w hydrocortisone 25mg BID    # CHF with EF 10-15% 2/2 ischemic cardiomyopathy s/p AICD. Elevated BNP on admission with R sided effusion and edema. Patient with persistent pleural effusions on CXR and US and b/l dependent edema.   - Maintain negative fluid balance with dialysis  - c/w holding ACE/BB in setting of sepsis/ hypotension on midodrine    #AFIB- hx of Afib and LV thrombus on coumadin prior to admission; TTE with definity shows no LV thrombus currently   - c/w Heparin gtt  - c/w holding Metoprolol 12.5 q12h given hypotension. Will use Dig for rate control if RVR  - HR 90s-100s; goal <110    #CAD- Hx of MI and ischemic CM s/p AICD, STEMI 7/2017, was started on Aspirin and Brilinta per records from [place]  - c/w aspirin 81 and Atorvastatin 80mg qhs.     #LE Edema- LE edema with chronic venous stasis. Duplex does not show DVT    PULMONARY   #Chronic Resp failure- S/p tracheostomy 4/5. Bedside US with simple b/l pleural effusions and atelectatic lung. Less concern for infectious source given b/l effusions with no septations/loculations. Likely related to fluid overload. If clinically worsening, can consider thoracentesis for fluid studies and cultures.  - c/w daily CPAP trials, weaning to trach collar as tolerated  - c/w HD to remove excess fluid    RENAL   #Chronic renal failure- likely ischemic ATN in setting of sepsis with low intravascular volume. Unknown baseline Cr presenting with Cr 3.93 with metabolic derangements. Renal team on board, now on intermittent HD. HD catheter removed 4/8 due to fungemia, now with Permacath removed 4/19 and replaced with L subclavian HD cath.   - Last HD 4/21, c/w intermittent HD per renal recs    #Adrenal Insufficiency- BP improved immediately after hydrocortisone and able to wean off levophed  - c/w hydrocortisone 25 mg BID given marginal response to cortrysyn stimulation test    #Elevated AG- Fluctuating at low level; lactate negative, glucose has been well controlled, possibly 2/2 uremia in setting of ESRD.   - Monitor BMP    #Hyperkalemia- PT with intermittently elevated K,  no EKG changes.  - Continue telemetry monitoring  - Trend K   - c/w dialysis   - Kayexalate PRN    GI   #PEG in place- C/D/I. c/w tube feeds (holding for IR procedure)  #Diarrhea likely 2/2 tube feeds, C.diff ruled out. Improving. Rectal tube in place    HEME  #Thrombocytopenia- Resolved. Most likely 2/2 zosyn or sepsis.   #Elevated INR- Improved. Unclear etiology. S/p Vit K and FFPx3. Monitor coags.    #Anemia- Likely 2/2 phlebotomy and CKD, no signs of bleeding. Prior studies showed Anemia of chronic disease.   - monitor CBC, transfuse if Hgb <7    ENDOCRINE   #Diabetes- HbA1C 8.6.  - c/w regular insulin 10 units q6  - c/w lantus 42 unit qhs  - MISS  - monitor FSG and adjust as needed     F: No IVF   E: Replete K<4 Mg<2, caution in setting of acute renal failure  N: PEG Placed 4/4, feeds changed to Vital 1.5 given diarrhea.   DVT ppx: on heparin drip  GI ppx: PPI 40 daily  Lines: L subclavian HD cath placed 4/19  Drips: none  Full code  Dispo: MICU PGY1 PROGRESS NOTE:    OVERNIGHT EVENTS: aPTT at 10pm was slightly elevated, therefore adjusted hep gt by 1ml/hr. Repeat aPTT at 4:30am aPTT at goal. At around 1am BP was noticed to be low MAP at around 56. Midodrine noticed to have been DC'd, restarted at 10mg q8h with MAP >65.    SUBJECTIVE: Patient seen and examined at bedside. Patient laying positioned on R side and tremulous. Following commands with eye movements but otherwise not following commands. Unable to assess ROS.    VITAL SIGNS:  Vital Signs Last 24 Hrs  T(C): 37.1 (22 Apr 2018 01:12), Max: 37.4 (21 Apr 2018 06:14)  T(F): 98.8 (22 Apr 2018 01:12), Max: 99.3 (21 Apr 2018 06:14)  HR: 108 (22 Apr 2018 05:36) (90 - 112)  BP: 93/67 (22 Apr 2018 05:00) (67/48 - 102/68)  BP(mean): 74 (22 Apr 2018 05:00) (52 - 79)  RR: 18 (22 Apr 2018 05:36) (10 - 72)  SpO2: 100% (22 Apr 2018 05:36) (99% - 100%)      PHYSICAL EXAM:  General: NAD, tremulous throughout appears in mild distress, cachectic, chronically ill-appearing   HEENT: NC/AT, PERRL, lacrimation in L eye, MM dry  Neck: supple, trach in place with some mucoid secretions, no blood at L HD catheter  Respiratory: coarse breath sounds, upper airway secretions, no wheezing, no retractions or labored breathing  Cardiovascular: tachycardic, +S1/S2, RRR, no MRG  Abdomen: soft, mildly distended and tympanic to percussion, nontender, +BS, PEG in place C/D/I  Extremities: WWP, 1+ pitting edema to level of thigh b/l, LUE with eschar on medial surface of foreaerm with surrounding tense edema, no fluctuance  Vasc: 2+ peripheral pulses b/l  Skin: dry, b/l LE with peeling skin  Neurological: nonverbal, following commands to eye movement, RUE flexed and rigid, LUE rigid, no spontaneous movement of b/l LE, alert and awake    MEDICATIONS:  MEDICATIONS  (STANDING):  aspirin  chewable 81 milliGRAM(s) Oral daily  atorvastatin 80 milliGRAM(s) Oral at bedtime  cefTRIAXone Injectable. 2000 milliGRAM(s) IV Push every 24 hours  chlorhexidine 0.12% Liquid 15 milliLiter(s) Swish and Spit two times a day  chlorhexidine 2% Cloths 1 Application(s) Topical daily  dextrose 5%. 1000 milliLiter(s) (50 mL/Hr) IV Continuous <Continuous>  dextrose 50% Injectable 12.5 Gram(s) IV Push once  dextrose 50% Injectable 25 Gram(s) IV Push once  dextrose 50% Injectable 25 Gram(s) IV Push once  ergocalciferol Drops 6000 Unit(s) Oral daily  escitalopram 5 milliGRAM(s) Oral daily  fluconAZOLE IVPB 200 milliGRAM(s) IV Intermittent every 24 hours  folic acid 1 milliGRAM(s) Oral daily  heparin  Infusion 1600 Unit(s)/Hr (16 mL/Hr) IV Continuous <Continuous>  hydrocortisone sodium succinate Injectable 25 milliGRAM(s) IV Push every 12 hours  insulin glargine Injectable (LANTUS) 42 Unit(s) SubCutaneous at bedtime  insulin regular  human corrective regimen sliding scale   SubCutaneous every 6 hours  insulin regular  human recombinant 10 Unit(s) SubCutaneous every 6 hours  modafinil 100 milliGRAM(s) Oral <User Schedule>  multivitamin 1 Tablet(s) Oral daily  pantoprazole   Suspension 40 milliGRAM(s) Oral daily  psyllium Powder 1 Packet(s) Oral daily  thiamine 100 milliGRAM(s) Oral daily    MEDICATIONS  (PRN):  acetaminophen    Suspension 650 milliGRAM(s) Oral every 6 hours PRN For Temp greater than 38 C (100.4 F)  acetaminophen    Suspension. 650 milliGRAM(s) Oral every 6 hours PRN Moderate Pain (4 - 6)  dextrose Gel 1 Dose(s) Oral once PRN Blood Glucose LESS THAN 70 milliGRAM(s)/deciliter  glucagon  Injectable 1 milliGRAM(s) IntraMuscular once PRN Glucose LESS THAN 70 milligrams/deciliter      ALLERGIES:  No Known Allergies      LABS:                              7.8    21.6  )-----------( 367      ( 22 Apr 2018 04:47 )             26.3     04-22    141  |  98  |  42<H>  ----------------------------<  105<H>  4.4   |  24  |  2.90<H>    Ca    9.1      22 Apr 2018 04:47  Phos  4.2     04-22  Mg     2.0     04-22 04-21-18 @ 07:01  -  04-22-18 @ 07:00  --------------------------------------------------------  IN: 1800 mL / OUT: 1750 mL / NET: 50 mL    04-22-18 @ 07:01  -  04-22-18 @ 08:30  --------------------------------------------------------  IN: 79 mL / OUT: 0 mL / NET: 79 mL        RADIOLOGY & ADDITIONAL TESTS: Reviewed.    63M PMH HFrEF 10-15% (ischemic), MI, s/p AICD vs PPM, possible Afib, HTN, DM2 on insulin, possible CKD, and gout, who presented with a chief complaint of generalized weakness s/p septic shock likely 2/2 LE cellulitis complicated by ATN requiring dialysis. Now with AMS likely from brainstem infarct and/or toxic metabolic encephalopathy, now with candidemia (resolving) and sepsis 2/2 klebsiella bacteremia. He was intiially treated with meropenem, then IN: switched to zosyn, then finally to ceftriaxone when sensitivities were available. Hydrocortisone was discontinued due to infection. Patient has continued to have low grade temperatures and intermittently required levophed, hydrocortisone restarted on 4/17.    NEURO  #Toxic Metabolic Encephalopathy- Likely 2/2 acute infarct on imaging; Patient acutely unresponsive since 3/22 and has had waxing/ waning mental status since. CT, CTA negative. VEEG w/ no seizure activity. Decline in mental status likely 2/2 to underlying infection vs acute neurologic event. 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 Per neurology may have had ischemic event from hypoperfusion. Also showing disc protrusion at C3/C4 level coursing superiorly to the C2/C3 contacting the ventral spinal cord. Per neurology, this may be contributing to weakness, however would not explain change in mental status.  S/p PEG/trach on 4/4. Repeat CT head 4/11 performed due to concern for not moving LUE, showing only chronic infarctions.  - C/w Modafinil  - CT C-spine w/o contrast showing multilevel degenerative changes with mod-severe foraminal narrowing @ C3-C4  - per neuro, no acute interventions   - patient likely at new baseline mental status    #Seizures- possible seizure activity seen on VEEG this morning. Started on Keppra 1000mg after HD.  - VEEG and f/u epilepsy recommendations    ID  #Klebsiella Bacteremia- Urine, blood and sputum cx growing klebsiella. S/p Zosyn 4/14-4/15. S/p Meropenem 4/13. Sensitive to ceftriaxone.  -C/w Ceftriaxone 2g q 24 hrs (4/15-4/26)  -surveillance blood cultures NGTD   - f/u ID recs  - s/p permacath removal 4/19; cath tip negative growth     #Candidemia with candida tropicalis- Blood cultures positive for candida tropicalis on 4/7 and again on 4/9. No source identified. CTAP 4/11 negative for abscess. RUKHSANA negative for vegetations/ infection of AICD; Tunelled HD catheter removed 4/8, temp HD placed 4/10, permanent HD cath placed 4/12  - c/w Fluconazole 200mg q 24hrs (4/13-4/24)  - Surveillance fungal cultures 4/10 NGTD  - permacath removed 4/19    #UTI- urine culture from 4/11 growing klebsiella sensitive to ceftriaxone, and now bacteremia with GNR likely 2/2 klebsiella (in urine and lung).  - c/w management as per above    CARDIOVASCULAR   # Hypotension-  - c/w Midodrine 15 mg q8h to maintain SBP>65  - c/w hydrocortisone 25mg BID    # CHF with EF 10-15% 2/2 ischemic cardiomyopathy s/p AICD. Elevated BNP on admission with R sided effusion and edema. Patient with persistent pleural effusions on CXR and US and b/l dependent edema.   - Maintain negative fluid balance with dialysis  - c/w holding ACE/BB in setting of sepsis/ hypotension on midodrine    #AFIB- hx of Afib and LV thrombus on coumadin prior to admission; TTE with definity shows no LV thrombus currently   - c/w Heparin gtt  - c/w holding Metoprolol 12.5 q12h given hypotension. Will use Dig for rate control if RVR  - HR 90s-100s; goal <110    #CAD- Hx of MI and ischemic CM s/p AICD, STEMI 7/2017, was started on Aspirin and Brilinta per records from [place]  - c/w aspirin 81 and Atorvastatin 80mg qhs.     #LE Edema- LE edema with chronic venous stasis. Duplex does not show DVT    PULMONARY   #Chronic Resp failure- S/p tracheostomy 4/5. Bedside US with simple b/l pleural effusions and atelectatic lung. Less concern for infectious source given b/l effusions with no septations/loculations. Likely related to fluid overload. If clinically worsening, can consider thoracentesis for fluid studies and cultures.  - c/w daily CPAP trials, weaning to trach collar as tolerated  - c/w HD to remove excess fluid    RENAL   #Chronic renal failure- likely ischemic ATN in setting of sepsis with low intravascular volume. Unknown baseline Cr presenting with Cr 3.93 with metabolic derangements. Renal team on board, now on intermittent HD. HD catheter removed 4/8 due to fungemia, now with Permacath removed 4/19 and replaced with L subclavian HD cath.   - Last HD 4/21, c/w intermittent HD per renal recs    #Adrenal Insufficiency- BP improved immediately after hydrocortisone and able to wean off levophed  - c/w hydrocortisone 25 mg BID given marginal response to cortrysyn stimulation test    #Elevated AG- Fluctuating at low level; lactate negative, glucose has been well controlled, possibly 2/2 uremia in setting of ESRD.   - Monitor BMP    #Hyperkalemia- PT with intermittently elevated K,  no EKG changes.  - Trend K   - c/w dialysis   - Kayexalate PRN    GI   #PEG in place- C/D/I. c/w tube feeds (holding for IR procedure)  #Diarrhea likely 2/2 tube feeds, C.diff ruled out. Improving. Rectal tube in place    HEME  #Thrombocytopenia- Resolved. Most likely 2/2 zosyn or sepsis.   #Elevated INR- Improved. Unclear etiology. S/p Vit K and FFPx3. Monitor coags.    #Anemia- Likely 2/2 phlebotomy and CKD, no signs of bleeding. Prior studies showed Anemia of chronic disease.   - monitor CBC, transfuse if Hgb <7    ENDOCRINE   #Diabetes- HbA1C 8.6.  - c/w regular insulin 10 units q6  - c/w lantus 42 unit qhs  - MISS  - monitor FSG and adjust as needed     MSK  #LUE Swelling- noted to have eschar with surrounding edema which is tense, no fluctuance or warmth. F/u LUE US.     F: No IVF   E: Replete K<4 Mg<2, caution in setting of acute renal failure  N: PEG Placed 4/4, Vital 1.5 given diarrhea  DVT ppx: on heparin drip  GI ppx: PPI 40 daily  Lines: L subclavian HD cath placed 4/19  Drips: none  Full code  Dispo: MICU PGY1 PROGRESS NOTE:    OVERNIGHT EVENTS: aPTT at 10pm was slightly elevated, therefore adjusted hep gt by 1ml/hr. Repeat aPTT at 4:30am aPTT at goal. At around 1am BP was noticed to be low MAP at around 56. Midodrine noticed to have been DC'd, restarted at 10mg q8h with MAP >65.    SUBJECTIVE: Patient seen and examined at bedside. Patient laying positioned on R side and tremulous. Following commands with eye movements but otherwise not following commands. Unable to assess ROS.    VITAL SIGNS:  Vital Signs Last 24 Hrs  T(C): 37.1 (22 Apr 2018 01:12), Max: 37.4 (21 Apr 2018 06:14)  T(F): 98.8 (22 Apr 2018 01:12), Max: 99.3 (21 Apr 2018 06:14)  HR: 108 (22 Apr 2018 05:36) (90 - 112)  BP: 93/67 (22 Apr 2018 05:00) (67/48 - 102/68)  BP(mean): 74 (22 Apr 2018 05:00) (52 - 79)  RR: 18 (22 Apr 2018 05:36) (10 - 72)  SpO2: 100% (22 Apr 2018 05:36) (99% - 100%)      PHYSICAL EXAM:  General: NAD, tremulous throughout appears in mild distress, cachectic, chronically ill-appearing   HEENT: NC/AT, PERRL, lacrimation in L eye, MM dry  Neck: supple, trach in place with some mucoid secretions, no blood at L HD catheter  Respiratory: coarse breath sounds, upper airway secretions, no wheezing, no retractions or labored breathing  Cardiovascular: tachycardic, +S1/S2, RRR, no MRG  Abdomen: soft, mildly distended and tympanic to percussion, nontender, +BS, PEG in place C/D/I  Extremities: WWP, 1+ pitting edema to level of thigh b/l, LUE with eschar on medial surface of foreaerm with surrounding tense edema, no fluctuance  Vasc: 2+ peripheral pulses b/l  Skin: dry, b/l LE with peeling skin  Neurological: nonverbal, following commands to eye movement, RUE flexed and rigid, LUE rigid, no spontaneous movement of b/l LE, alert and awake    MEDICATIONS:  MEDICATIONS  (STANDING):  aspirin  chewable 81 milliGRAM(s) Oral daily  atorvastatin 80 milliGRAM(s) Oral at bedtime  cefTRIAXone Injectable. 2000 milliGRAM(s) IV Push every 24 hours  chlorhexidine 0.12% Liquid 15 milliLiter(s) Swish and Spit two times a day  chlorhexidine 2% Cloths 1 Application(s) Topical daily  dextrose 5%. 1000 milliLiter(s) (50 mL/Hr) IV Continuous <Continuous>  dextrose 50% Injectable 12.5 Gram(s) IV Push once  dextrose 50% Injectable 25 Gram(s) IV Push once  dextrose 50% Injectable 25 Gram(s) IV Push once  ergocalciferol Drops 6000 Unit(s) Oral daily  escitalopram 5 milliGRAM(s) Oral daily  fluconAZOLE IVPB 200 milliGRAM(s) IV Intermittent every 24 hours  folic acid 1 milliGRAM(s) Oral daily  heparin  Infusion 1600 Unit(s)/Hr (16 mL/Hr) IV Continuous <Continuous>  hydrocortisone sodium succinate Injectable 25 milliGRAM(s) IV Push every 12 hours  insulin glargine Injectable (LANTUS) 42 Unit(s) SubCutaneous at bedtime  insulin regular  human corrective regimen sliding scale   SubCutaneous every 6 hours  insulin regular  human recombinant 10 Unit(s) SubCutaneous every 6 hours  modafinil 100 milliGRAM(s) Oral <User Schedule>  multivitamin 1 Tablet(s) Oral daily  pantoprazole   Suspension 40 milliGRAM(s) Oral daily  psyllium Powder 1 Packet(s) Oral daily  thiamine 100 milliGRAM(s) Oral daily    MEDICATIONS  (PRN):  acetaminophen    Suspension 650 milliGRAM(s) Oral every 6 hours PRN For Temp greater than 38 C (100.4 F)  acetaminophen    Suspension. 650 milliGRAM(s) Oral every 6 hours PRN Moderate Pain (4 - 6)  dextrose Gel 1 Dose(s) Oral once PRN Blood Glucose LESS THAN 70 milliGRAM(s)/deciliter  glucagon  Injectable 1 milliGRAM(s) IntraMuscular once PRN Glucose LESS THAN 70 milligrams/deciliter      ALLERGIES:  No Known Allergies      LABS:                              7.8    21.6  )-----------( 367      ( 22 Apr 2018 04:47 )             26.3     04-22    141  |  98  |  42<H>  ----------------------------<  105<H>  4.4   |  24  |  2.90<H>    Ca    9.1      22 Apr 2018 04:47  Phos  4.2     04-22  Mg     2.0     04-22 04-21-18 @ 07:01  -  04-22-18 @ 07:00  --------------------------------------------------------  IN: 1800 mL / OUT: 1750 mL / NET: 50 mL    04-22-18 @ 07:01  -  04-22-18 @ 08:30  --------------------------------------------------------  IN: 79 mL / OUT: 0 mL / NET: 79 mL        RADIOLOGY & ADDITIONAL TESTS: Reviewed.    63M PMH HFrEF 10-15% (ischemic), MI, s/p AICD vs PPM, possible Afib, HTN, DM2 on insulin, possible CKD, and gout, who presented with a chief complaint of generalized weakness s/p septic shock likely 2/2 LE cellulitis complicated by ATN requiring dialysis. Now with AMS likely from brainstem infarct and/or toxic metabolic encephalopathy, now with candidemia (resolving) and sepsis 2/2 klebsiella bacteremia. He was intiially treated with meropenem, then IN: switched to zosyn, then finally to ceftriaxone when sensitivities were available. Hydrocortisone was discontinued due to infection. Patient has continued to have low grade temperatures and intermittently required levophed, hydrocortisone restarted on 4/17.    NEURO  #Toxic Metabolic Encephalopathy- Likely 2/2 acute infarct on imaging; Patient acutely unresponsive since 3/22 and has had waxing/ waning mental status since. CT, CTA negative. VEEG w/ no seizure activity. Decline in mental status likely 2/2 to underlying infection vs acute neurologic event. 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 Per neurology may have had ischemic event from hypoperfusion. Also showing disc protrusion at C3/C4 level coursing superiorly to the C2/C3 contacting the ventral spinal cord. Per neurology, this may be contributing to weakness, however would not explain change in mental status.  S/p PEG/trach on 4/4. Repeat CT head 4/11 performed due to concern for not moving LUE, showing only chronic infarctions.  - C/w Modafinil  - CT C-spine w/o contrast showing multilevel degenerative changes with mod-severe foraminal narrowing @ C3-C4  - per neuro, no acute interventions   - patient likely at new baseline mental status    #Seizures- possible seizure activity seen on VEEG this morning. Started on Keppra 1000mg after HD.  - VEEG and f/u epilepsy recommendations    ID  #Klebsiella Bacteremia- Urine, blood and sputum cx growing klebsiella. S/p Zosyn 4/14-4/15. S/p Meropenem 4/13. Sensitive to ceftriaxone.  -C/w Ceftriaxone 2g q 24 hrs (4/15-4/26)  -surveillance blood cultures NGTD   - f/u ID recs  - s/p permacath removal 4/19; cath tip negative growth     #Candidemia with candida tropicalis- Blood cultures positive for candida tropicalis on 4/7 and again on 4/9. No source identified. CTAP 4/11 negative for abscess. RUKHSANA negative for vegetations/ infection of AICD; Tunelled HD catheter removed 4/8, temp HD placed 4/10, permanent HD cath placed 4/12  - c/w Fluconazole 200mg q 24hrs (4/13-4/24)  - Surveillance fungal cultures 4/10 NGTD  - permacath removed 4/19    #UTI- urine culture from 4/11 growing klebsiella sensitive to ceftriaxone, and now bacteremia with GNR likely 2/2 klebsiella (in urine and lung).  - c/w management as per above    CARDIOVASCULAR   # Hypotension-  - c/w Midodrine 15 mg q8h to maintain SBP>65  - c/w hydrocortisone 25mg BID    # CHF with EF 10-15% 2/2 ischemic cardiomyopathy s/p AICD. Elevated BNP on admission with R sided effusion and edema. Patient with persistent pleural effusions on CXR and US and b/l dependent edema.   - Maintain negative fluid balance with dialysis  - c/w holding ACE/BB in setting of sepsis/ hypotension on midodrine    #AFIB- hx of Afib and LV thrombus on coumadin prior to admission; TTE with definity shows no LV thrombus currently   - c/w Heparin gtt  - c/w holding Metoprolol 12.5 q12h given hypotension. Will use Dig for rate control if RVR  - HR 90s-100s; goal <110    #CAD- Hx of MI and ischemic CM s/p AICD, STEMI 7/2017, was started on Aspirin and Brilinta per records from [place]  - c/w aspirin 81 and Atorvastatin 80mg qhs.     #LE Edema- LE edema with chronic venous stasis. Duplex does not show DVT    PULMONARY   #Chronic Resp failure- S/p tracheostomy 4/5. Bedside US with simple b/l pleural effusions and atelectatic lung. Less concern for infectious source given b/l effusions with no septations/loculations. Likely related to fluid overload. If clinically worsening, can consider thoracentesis for fluid studies and cultures.  - c/w daily CPAP trials, weaning to trach collar as tolerated  - c/w HD to remove excess fluid    #R/o Pneumonia- increased secretions overnight and elevated WBC. CXR with no new or worsened consolidations. F/u repeat sputum cxs.    RENAL   #Chronic renal failure- likely ischemic ATN in setting of sepsis with low intravascular volume. Unknown baseline Cr presenting with Cr 3.93 with metabolic derangements. Renal team on board, now on intermittent HD. HD catheter removed 4/8 due to fungemia, now with Permacath removed 4/19 and replaced with L subclavian HD cath.   - Last HD 4/21, c/w intermittent HD per renal recs    #Adrenal Insufficiency- BP improved immediately after hydrocortisone and able to wean off levophed  - c/w hydrocortisone 25 mg BID given marginal response to cortrysyn stimulation test    #Elevated AG- Fluctuating at low level; lactate negative, glucose has been well controlled, possibly 2/2 uremia in setting of ESRD.   - Monitor BMP    #Hyperkalemia- PT with intermittently elevated K,  no EKG changes.  - Trend K   - c/w dialysis   - Kayexalate PRN    GI   #PEG in place- C/D/I. c/w tube feeds (holding for IR procedure)  #Diarrhea likely 2/2 tube feeds, C.diff ruled out. Improving. Rectal tube in place    HEME  #Thrombocytopenia- Resolved. Most likely 2/2 zosyn or sepsis.   #Elevated INR- Improved. Unclear etiology. S/p Vit K and FFPx3. Monitor coags.    #Anemia- Likely 2/2 phlebotomy and CKD, no signs of bleeding. Prior studies showed Anemia of chronic disease.   - monitor CBC, transfuse if Hgb <7    ENDOCRINE   #Diabetes- HbA1C 8.6.  - c/w regular insulin 10 units q6  - c/w lantus 42 unit qhs  - MISS  - monitor FSG and adjust as needed     MSK  #LUE Swelling- noted to have eschar with surrounding edema which is tense, no fluctuance or warmth. F/u LUE US.     F: No IVF   E: Replete K<4 Mg<2, caution in setting of acute renal failure  N: PEG Placed 4/4, Vital 1.5 given diarrhea  DVT ppx: on heparin drip  GI ppx: PPI 40 daily  Lines: L subclavian HD cath placed 4/19  Drips: none  Full code  Dispo: MICU

## 2018-04-22 NOTE — PROGRESS NOTE ADULT - PROBLEM SELECTOR PLAN 1
-Due to ATN – from cardiogenic shock – not resolving – on HD   -Last HD done on 4/21 - 1.5 liters removed   -no need for HD today    -Electrolytes noted   -Volume status acceptable – we will do UF – 1 liter to 1.5 liters as he tolerates   -In and outs   -Daily weights   -Renal diet

## 2018-04-22 NOTE — PROGRESS NOTE ADULT - ASSESSMENT
Would continue keppra at 500mg daily (starting tomorrow) with an additional 500mg after HD. Continue EEG. Monitor mental status. Continue modafanil 100mg BID. Will follow.

## 2018-04-22 NOTE — PROGRESS NOTE ADULT - ATTENDING COMMENTS
oliguric ATN  urine output remains low  tolerated HD well yesterday  nephrologically stable today  to reassess in AM to determine timing of next HD treatment

## 2018-04-22 NOTE — PROGRESS NOTE ADULT - ASSESSMENT
The patient with GILMER – due to ATN – not resolving. – oligo/anuric. Requiring HD. LasT hd done on 4/21 - 1.5 liters removed. No HD for today The patient with GILMER – due to ATN – not resolving. – oligo/anuric. Requiring HD. Last hd done on 4/21 - 1.5 liters removed. No HD for today

## 2018-04-23 LAB
ANION GAP SERPL CALC-SCNC: 18 MMOL/L — HIGH (ref 5–17)
APTT BLD: 76.8 SEC — HIGH (ref 27.5–37.4)
BUN SERPL-MCNC: 60 MG/DL — HIGH (ref 7–23)
CALCIUM SERPL-MCNC: 9.2 MG/DL — SIGNIFICANT CHANGE UP (ref 8.4–10.5)
CHLORIDE SERPL-SCNC: 100 MMOL/L — SIGNIFICANT CHANGE UP (ref 96–108)
CO2 SERPL-SCNC: 26 MMOL/L — SIGNIFICANT CHANGE UP (ref 22–31)
CREAT SERPL-MCNC: 3.75 MG/DL — HIGH (ref 0.5–1.3)
GLUCOSE BLDC GLUCOMTR-MCNC: 100 MG/DL — HIGH (ref 70–99)
GLUCOSE BLDC GLUCOMTR-MCNC: 122 MG/DL — HIGH (ref 70–99)
GLUCOSE BLDC GLUCOMTR-MCNC: 131 MG/DL — HIGH (ref 70–99)
GLUCOSE BLDC GLUCOMTR-MCNC: 62 MG/DL — LOW (ref 70–99)
GLUCOSE SERPL-MCNC: 132 MG/DL — HIGH (ref 70–99)
HCT VFR BLD CALC: 24.4 % — LOW (ref 39–50)
HGB BLD-MCNC: 7.2 G/DL — LOW (ref 13–17)
MAGNESIUM SERPL-MCNC: 2.1 MG/DL — SIGNIFICANT CHANGE UP (ref 1.6–2.6)
MCHC RBC-ENTMCNC: 29 PG — SIGNIFICANT CHANGE UP (ref 27–34)
MCHC RBC-ENTMCNC: 29.5 G/DL — LOW (ref 32–36)
MCV RBC AUTO: 98.4 FL — SIGNIFICANT CHANGE UP (ref 80–100)
PHOSPHATE SERPL-MCNC: 5.4 MG/DL — HIGH (ref 2.5–4.5)
PLATELET # BLD AUTO: 304 K/UL — SIGNIFICANT CHANGE UP (ref 150–400)
POTASSIUM SERPL-MCNC: 4.7 MMOL/L — SIGNIFICANT CHANGE UP (ref 3.5–5.3)
POTASSIUM SERPL-SCNC: 4.7 MMOL/L — SIGNIFICANT CHANGE UP (ref 3.5–5.3)
RBC # BLD: 2.48 M/UL — LOW (ref 4.2–5.8)
RBC # FLD: 18.9 % — HIGH (ref 10.3–16.9)
SODIUM SERPL-SCNC: 144 MMOL/L — SIGNIFICANT CHANGE UP (ref 135–145)
WBC # BLD: 15.7 K/UL — HIGH (ref 3.8–10.5)
WBC # FLD AUTO: 15.7 K/UL — HIGH (ref 3.8–10.5)

## 2018-04-23 PROCEDURE — 99233 SBSQ HOSP IP/OBS HIGH 50: CPT | Mod: GC

## 2018-04-23 PROCEDURE — 95951: CPT | Mod: 26

## 2018-04-23 PROCEDURE — 99232 SBSQ HOSP IP/OBS MODERATE 35: CPT

## 2018-04-23 RX ORDER — LEVETIRACETAM 250 MG/1
500 TABLET, FILM COATED ORAL DAILY
Qty: 0 | Refills: 0 | Status: DISCONTINUED | OUTPATIENT
Start: 2018-04-23 | End: 2018-04-23

## 2018-04-23 RX ORDER — LEVETIRACETAM 250 MG/1
500 TABLET, FILM COATED ORAL EVERY 24 HOURS
Qty: 0 | Refills: 0 | Status: DISCONTINUED | OUTPATIENT
Start: 2018-04-23 | End: 2018-07-01

## 2018-04-23 RX ORDER — DEXTROSE 50 % IN WATER 50 %
25 SYRINGE (ML) INTRAVENOUS ONCE
Qty: 0 | Refills: 0 | Status: COMPLETED | OUTPATIENT
Start: 2018-04-23 | End: 2018-04-23

## 2018-04-23 RX ORDER — LEVETIRACETAM 250 MG/1
250 TABLET, FILM COATED ORAL
Qty: 0 | Refills: 0 | Status: DISCONTINUED | OUTPATIENT
Start: 2018-04-23 | End: 2018-05-07

## 2018-04-23 RX ORDER — LEVETIRACETAM 250 MG/1
500 TABLET, FILM COATED ORAL
Qty: 0 | Refills: 0 | Status: DISCONTINUED | OUTPATIENT
Start: 2018-04-23 | End: 2018-04-23

## 2018-04-23 RX ADMIN — Medication 25 MILLIGRAM(S): at 10:00

## 2018-04-23 RX ADMIN — PANTOPRAZOLE SODIUM 40 MILLIGRAM(S): 20 TABLET, DELAYED RELEASE ORAL at 12:38

## 2018-04-23 RX ADMIN — Medication 1 MILLIGRAM(S): at 12:03

## 2018-04-23 RX ADMIN — INSULIN HUMAN 10 UNIT(S): 100 INJECTION, SOLUTION SUBCUTANEOUS at 06:20

## 2018-04-23 RX ADMIN — INSULIN HUMAN 10 UNIT(S): 100 INJECTION, SOLUTION SUBCUTANEOUS at 12:50

## 2018-04-23 RX ADMIN — MODAFINIL 100 MILLIGRAM(S): 200 TABLET ORAL at 06:54

## 2018-04-23 RX ADMIN — Medication 25 MILLILITER(S): at 12:02

## 2018-04-23 RX ADMIN — Medication 81 MILLIGRAM(S): at 12:03

## 2018-04-23 RX ADMIN — MODAFINIL 100 MILLIGRAM(S): 200 TABLET ORAL at 12:53

## 2018-04-23 RX ADMIN — Medication 1 PACKET(S): at 12:38

## 2018-04-23 RX ADMIN — MIDODRINE HYDROCHLORIDE 15 MILLIGRAM(S): 2.5 TABLET ORAL at 22:09

## 2018-04-23 RX ADMIN — MIDODRINE HYDROCHLORIDE 15 MILLIGRAM(S): 2.5 TABLET ORAL at 06:19

## 2018-04-23 RX ADMIN — ERGOCALCIFEROL 6000 UNIT(S): 1.25 CAPSULE ORAL at 12:03

## 2018-04-23 RX ADMIN — MIDODRINE HYDROCHLORIDE 15 MILLIGRAM(S): 2.5 TABLET ORAL at 14:10

## 2018-04-23 RX ADMIN — CHLORHEXIDINE GLUCONATE 15 MILLILITER(S): 213 SOLUTION TOPICAL at 12:01

## 2018-04-23 RX ADMIN — FLUCONAZOLE 100 MILLIGRAM(S): 150 TABLET ORAL at 21:09

## 2018-04-23 RX ADMIN — CHLORHEXIDINE GLUCONATE 15 MILLILITER(S): 213 SOLUTION TOPICAL at 22:09

## 2018-04-23 RX ADMIN — ESCITALOPRAM OXALATE 5 MILLIGRAM(S): 10 TABLET, FILM COATED ORAL at 12:04

## 2018-04-23 RX ADMIN — INSULIN HUMAN 10 UNIT(S): 100 INJECTION, SOLUTION SUBCUTANEOUS at 01:52

## 2018-04-23 RX ADMIN — CHLORHEXIDINE GLUCONATE 1 APPLICATION(S): 213 SOLUTION TOPICAL at 06:20

## 2018-04-23 RX ADMIN — ATORVASTATIN CALCIUM 80 MILLIGRAM(S): 80 TABLET, FILM COATED ORAL at 23:10

## 2018-04-23 RX ADMIN — Medication 25 MILLIGRAM(S): at 22:11

## 2018-04-23 RX ADMIN — Medication 1 TABLET(S): at 12:04

## 2018-04-23 RX ADMIN — Medication 100 MILLIGRAM(S): at 12:03

## 2018-04-23 RX ADMIN — INSULIN HUMAN 10 UNIT(S): 100 INJECTION, SOLUTION SUBCUTANEOUS at 17:55

## 2018-04-23 RX ADMIN — INSULIN GLARGINE 42 UNIT(S): 100 INJECTION, SOLUTION SUBCUTANEOUS at 22:11

## 2018-04-23 RX ADMIN — LEVETIRACETAM 500 MILLIGRAM(S): 250 TABLET, FILM COATED ORAL at 12:03

## 2018-04-23 RX ADMIN — INSULIN HUMAN 2: 100 INJECTION, SOLUTION SUBCUTANEOUS at 23:30

## 2018-04-23 RX ADMIN — CEFTRIAXONE 2000 MILLIGRAM(S): 500 INJECTION, POWDER, FOR SOLUTION INTRAMUSCULAR; INTRAVENOUS at 17:55

## 2018-04-23 NOTE — PROGRESS NOTE ADULT - SUBJECTIVE AND OBJECTIVE BOX
NEUROLOGY FOLLOW-UP NOTE    INTERVAL HISTORY:  unable to assess given patient's status    REVIEW OF SYSTEMS:  unable to assess given patient's status    MEDICATIONS:  acetaminophen    Suspension 650 milliGRAM(s) Oral every 6 hours PRN  acetaminophen    Suspension. 650 milliGRAM(s) Oral every 6 hours PRN  aspirin  chewable 81 milliGRAM(s) Oral daily  atorvastatin 80 milliGRAM(s) Oral at bedtime  cefTRIAXone Injectable. 2000 milliGRAM(s) IV Push every 24 hours  chlorhexidine 0.12% Liquid 15 milliLiter(s) Swish and Spit two times a day  chlorhexidine 2% Cloths 1 Application(s) Topical daily  dextrose 5%. 1000 milliLiter(s) IV Continuous <Continuous>  dextrose 50% Injectable 12.5 Gram(s) IV Push once  dextrose 50% Injectable 25 Gram(s) IV Push once  dextrose 50% Injectable 25 Gram(s) IV Push once  dextrose Gel 1 Dose(s) Oral once PRN  ergocalciferol Drops 6000 Unit(s) Oral daily  escitalopram 5 milliGRAM(s) Oral daily  fluconAZOLE IVPB 200 milliGRAM(s) IV Intermittent every 24 hours  folic acid 1 milliGRAM(s) Oral daily  glucagon  Injectable 1 milliGRAM(s) IntraMuscular once PRN  heparin  Infusion 1600 Unit(s)/Hr IV Continuous <Continuous>  hydrocortisone sodium succinate Injectable 25 milliGRAM(s) IV Push every 12 hours  insulin glargine Injectable (LANTUS) 42 Unit(s) SubCutaneous at bedtime  insulin regular  human corrective regimen sliding scale   SubCutaneous every 6 hours  insulin regular  human recombinant 10 Unit(s) SubCutaneous every 6 hours  levETIRAcetam  Solution 500 milliGRAM(s) Oral <User Schedule>  levETIRAcetam  Solution 500 milliGRAM(s) Oral every 24 hours  midodrine 15 milliGRAM(s) Oral every 8 hours  modafinil 100 milliGRAM(s) Oral <User Schedule>  multivitamin 1 Tablet(s) Oral daily  pantoprazole   Suspension 40 milliGRAM(s) Oral daily  psyllium Powder 1 Packet(s) Oral daily  thiamine 100 milliGRAM(s) Oral daily    VITAL SIGNS:  Vital Signs Last 24 Hrs  T(C): 36.8 (23 Apr 2018 10:47), Max: 37.4 (22 Apr 2018 17:34)  T(F): 98.2 (23 Apr 2018 10:47), Max: 99.4 (22 Apr 2018 17:34)  HR: 108 (23 Apr 2018 10:00) (74 - 108)  BP: 93/68 (23 Apr 2018 10:00) (71/56 - 93/68)  BP(mean): 79 (23 Apr 2018 10:00) (61 - 79)  RR: 23 (23 Apr 2018 10:00) (11 - 24)  SpO2: 100% (23 Apr 2018 11:30) (100% - 100%)    PHYSICAL EXAMINATION:  General: chronically ill appearing male in no distress  Eyes: unable to assess since patient was resisting exam keeping his eyes closed  Cardiovascular: Regular rate and rhythm; S1 and S2 Normal; No murmurs, gallops or rubs.  Neurologic:  - Mental Status:  sleepy but arousable to verbal stimuli. Not following commands.   - Cranial Nerves II-XII:    II:  unable to assess since patient was resisting exam keeping his eyes closed  III, IV, VI:  unable to assess since patient was not following commands  V:  unable to assess since patient was not following commands  VII:  unable to assess since patient was not following commands  VIII:  unable to assess   IX, X:  unable to assess since patient was not following commands  XI:  unable to assess since patient was not following commands  XII:  unable to assess since patient was not following commands  - Motor: unable to assess since patient was not following commands. Normal muscle bulk and tone throughout.  - Reflexes: not assessed as patient was mati his extremities  - Sensory:  unable to assess    - Coordination:  unable to assess   - Gait:   deferred    LABORATORY STUDIES:                          7.2    15.7  )-----------( 304      ( 23 Apr 2018 04:06 )             24.4     04-23    144  |  100  |  60<H>  ----------------------------<  132<H>  4.7   |  26  |  3.75<H>    Ca    9.2      23 Apr 2018 04:06  Phos  5.4     04-23  Mg     2.1     04-23      PTT - ( 23 Apr 2018 04:06 )  PTT:76.8 sec      RADIOLOGY & ADDITIONAL STUDIES:      IMPRESSION & PLAN:    63M PMH HFrEF 10-15% (ischemic), MI, s/p AICD vs PPM, possible Afib, HTN, DM2 on insulin, possible CKD, and gout, who presented with a chief complaint of generalized weakness s/p septic shock likely 2/2 LE cellulitis complicated by ATN requiring dialysis. Now with AMS likely from brainstem infarct and/or toxic metabolic encephalopathy. Patient also has candidemia (resolving) and sepsis 2/2 klebsiella bacteremia.     - Continue monitoring mental status.   - Continue keppra 500mg qd and additional 500mg with HD.  - Continue modafinil 100mg bid.     NOTE IS INCOMPLETE. NEUROLOGY FOLLOW-UP NOTE    INTERVAL HISTORY:  unable to assess given patient's status    REVIEW OF SYSTEMS:  unable to assess given patient's status    MEDICATIONS:  acetaminophen    Suspension 650 milliGRAM(s) Oral every 6 hours PRN  acetaminophen    Suspension. 650 milliGRAM(s) Oral every 6 hours PRN  aspirin  chewable 81 milliGRAM(s) Oral daily  atorvastatin 80 milliGRAM(s) Oral at bedtime  cefTRIAXone Injectable. 2000 milliGRAM(s) IV Push every 24 hours  chlorhexidine 0.12% Liquid 15 milliLiter(s) Swish and Spit two times a day  chlorhexidine 2% Cloths 1 Application(s) Topical daily  dextrose 5%. 1000 milliLiter(s) IV Continuous <Continuous>  dextrose 50% Injectable 12.5 Gram(s) IV Push once  dextrose 50% Injectable 25 Gram(s) IV Push once  dextrose 50% Injectable 25 Gram(s) IV Push once  dextrose Gel 1 Dose(s) Oral once PRN  ergocalciferol Drops 6000 Unit(s) Oral daily  escitalopram 5 milliGRAM(s) Oral daily  fluconAZOLE IVPB 200 milliGRAM(s) IV Intermittent every 24 hours  folic acid 1 milliGRAM(s) Oral daily  glucagon  Injectable 1 milliGRAM(s) IntraMuscular once PRN  heparin  Infusion 1600 Unit(s)/Hr IV Continuous <Continuous>  hydrocortisone sodium succinate Injectable 25 milliGRAM(s) IV Push every 12 hours  insulin glargine Injectable (LANTUS) 42 Unit(s) SubCutaneous at bedtime  insulin regular  human corrective regimen sliding scale   SubCutaneous every 6 hours  insulin regular  human recombinant 10 Unit(s) SubCutaneous every 6 hours  levETIRAcetam  Solution 500 milliGRAM(s) Oral <User Schedule>  levETIRAcetam  Solution 500 milliGRAM(s) Oral every 24 hours  midodrine 15 milliGRAM(s) Oral every 8 hours  modafinil 100 milliGRAM(s) Oral <User Schedule>  multivitamin 1 Tablet(s) Oral daily  pantoprazole   Suspension 40 milliGRAM(s) Oral daily  psyllium Powder 1 Packet(s) Oral daily  thiamine 100 milliGRAM(s) Oral daily    VITAL SIGNS:  Vital Signs Last 24 Hrs  T(C): 36.8 (23 Apr 2018 10:47), Max: 37.4 (22 Apr 2018 17:34)  T(F): 98.2 (23 Apr 2018 10:47), Max: 99.4 (22 Apr 2018 17:34)  HR: 108 (23 Apr 2018 10:00) (74 - 108)  BP: 93/68 (23 Apr 2018 10:00) (71/56 - 93/68)  BP(mean): 79 (23 Apr 2018 10:00) (61 - 79)  RR: 23 (23 Apr 2018 10:00) (11 - 24)  SpO2: 100% (23 Apr 2018 11:30) (100% - 100%)    PHYSICAL EXAMINATION:  General: chronically ill appearing male in no distress  Eyes: unable to assess since patient was resisting exam keeping his eyes closed  Cardiovascular: Regular rate and rhythm; S1 and S2 Normal; No murmurs, gallops or rubs.  Neurologic:  - Mental Status:  sleepy but arousable to verbal stimuli. Not following commands.   - Cranial Nerves II-XII:    II:  unable to assess since patient was resisting exam keeping his eyes closed  III, IV, VI:  unable to assess since patient was not following commands  V:  unable to assess since patient was not following commands  VII:  unable to assess since patient was not following commands  VIII:  unable to assess   IX, X:  unable to assess since patient was not following commands  XI:  unable to assess since patient was not following commands  XII:  unable to assess since patient was not following commands  - Motor: unable to assess since patient was not following commands. Normal muscle bulk and tone throughout.  - Reflexes: not assessed as patient was mati his extremities  - Sensory:  unable to assess    - Coordination:  unable to assess   - Gait:   deferred    LABORATORY STUDIES:                          7.2    15.7  )-----------( 304      ( 23 Apr 2018 04:06 )             24.4     04-23    144  |  100  |  60<H>  ----------------------------<  132<H>  4.7   |  26  |  3.75<H>    Ca    9.2      23 Apr 2018 04:06  Phos  5.4     04-23  Mg     2.1     04-23      PTT - ( 23 Apr 2018 04:06 )  PTT:76.8 sec      RADIOLOGY & ADDITIONAL STUDIES:      IMPRESSION & PLAN:    63M PMH HFrEF 10-15% (ischemic), MI, s/p AICD vs PPM, possible Afib, HTN, DM2 on insulin, possible CKD, and gout, who presented with a chief complaint of generalized weakness s/p septic shock likely 2/2 LE cellulitis complicated by ATN requiring dialysis. Now with AMS likely from brainstem infarct and/or toxic metabolic encephalopathy. Patient also has candidemia (resolving) and sepsis 2/2 klebsiella bacteremia. Currently on VEEG showing...................    - Continue monitoring mental status.   - Continue keppra 500mg qd and additional 500mg with HD.  - Continue modafinil 100mg bid.     NOTE IS INCOMPLETE. NEUROLOGY FOLLOW-UP NOTE    INTERVAL HISTORY:  unable to assess given patient's status    REVIEW OF SYSTEMS:  unable to assess given patient's status    MEDICATIONS:  acetaminophen    Suspension 650 milliGRAM(s) Oral every 6 hours PRN  acetaminophen    Suspension. 650 milliGRAM(s) Oral every 6 hours PRN  aspirin  chewable 81 milliGRAM(s) Oral daily  atorvastatin 80 milliGRAM(s) Oral at bedtime  cefTRIAXone Injectable. 2000 milliGRAM(s) IV Push every 24 hours  chlorhexidine 0.12% Liquid 15 milliLiter(s) Swish and Spit two times a day  chlorhexidine 2% Cloths 1 Application(s) Topical daily  dextrose 5%. 1000 milliLiter(s) IV Continuous <Continuous>  dextrose 50% Injectable 12.5 Gram(s) IV Push once  dextrose 50% Injectable 25 Gram(s) IV Push once  dextrose 50% Injectable 25 Gram(s) IV Push once  dextrose Gel 1 Dose(s) Oral once PRN  ergocalciferol Drops 6000 Unit(s) Oral daily  escitalopram 5 milliGRAM(s) Oral daily  fluconAZOLE IVPB 200 milliGRAM(s) IV Intermittent every 24 hours  folic acid 1 milliGRAM(s) Oral daily  glucagon  Injectable 1 milliGRAM(s) IntraMuscular once PRN  heparin  Infusion 1600 Unit(s)/Hr IV Continuous <Continuous>  hydrocortisone sodium succinate Injectable 25 milliGRAM(s) IV Push every 12 hours  insulin glargine Injectable (LANTUS) 42 Unit(s) SubCutaneous at bedtime  insulin regular  human corrective regimen sliding scale   SubCutaneous every 6 hours  insulin regular  human recombinant 10 Unit(s) SubCutaneous every 6 hours  levETIRAcetam  Solution 500 milliGRAM(s) Oral <User Schedule>  levETIRAcetam  Solution 500 milliGRAM(s) Oral every 24 hours  midodrine 15 milliGRAM(s) Oral every 8 hours  modafinil 100 milliGRAM(s) Oral <User Schedule>  multivitamin 1 Tablet(s) Oral daily  pantoprazole   Suspension 40 milliGRAM(s) Oral daily  psyllium Powder 1 Packet(s) Oral daily  thiamine 100 milliGRAM(s) Oral daily    VITAL SIGNS:  Vital Signs Last 24 Hrs  T(C): 36.8 (23 Apr 2018 10:47), Max: 37.4 (22 Apr 2018 17:34)  T(F): 98.2 (23 Apr 2018 10:47), Max: 99.4 (22 Apr 2018 17:34)  HR: 108 (23 Apr 2018 10:00) (74 - 108)  BP: 93/68 (23 Apr 2018 10:00) (71/56 - 93/68)  BP(mean): 79 (23 Apr 2018 10:00) (61 - 79)  RR: 23 (23 Apr 2018 10:00) (11 - 24)  SpO2: 100% (23 Apr 2018 11:30) (100% - 100%)    PHYSICAL EXAMINATION:  General: chronically ill appearing male in no distress  Eyes: unable to assess since patient was resisting exam keeping his eyes closed  Cardiovascular: Regular rate and rhythm; S1 and S2 Normal; No murmurs, gallops or rubs.  Neurologic:  - Mental Status:  sleepy but arousable to verbal stimuli. Does not respond to painful stimuli in the arms and legs. Not following commands.   - Cranial Nerves II-XII:    II:  unable to assess since patient was resisting exam keeping his eyes closed  III, IV, VI:  unable to assess since patient was not following commands  V:  unable to assess since patient was not following commands  VII:  unable to assess since patient was not following commands  VIII:  unable to assess   IX, X:  unable to assess since patient was not following commands  XI:  unable to assess since patient was not following commands  XII:  unable to assess since patient was not following commands  - Motor: unable to assess since patient was not following commands, and extremities are rigid  - Reflexes: not assessed as patient was mati his extremities  - Sensory:  unable to assess    - Coordination:  unable to assess   - Gait:   deferred    LABORATORY STUDIES:                          7.2    15.7  )-----------( 304      ( 23 Apr 2018 04:06 )             24.4     04-23    144  |  100  |  60<H>  ----------------------------<  132<H>  4.7   |  26  |  3.75<H>    Ca    9.2      23 Apr 2018 04:06  Phos  5.4     04-23  Mg     2.1     04-23    PTT - ( 23 Apr 2018 04:06 )  PTT:76.8 sec    RADIOLOGY & ADDITIONAL STUDIES:  reviewed     IMPRESSION & PLAN:    The patient is a 64yo man with a PMHx HFrEF 10-15% (ischemic), MI, s/p AICD vs PPM, possible Afib, HTN, DM2 on insulin, possible CKD, and gout, who presented with a chief complaint of generalized weakness s/p septic shock likely 2/2 LE cellulitis complicated by ATN requiring dialysis. Now with AMS likely from brainstem infarct and/or toxic metabolic encephalopathy. Patient also has candidemia (resolving) and sepsis 2/2 klebsiella bacteremia. The patient had an episode of arm shaking in the morning of 4/23 which is likely a tremor and less likely seizure. Currently on VEEG not showing epileptiform waves.     - Continue monitoring mental status.   - Continue on VEEG until tomorrow.   - Continue keppra 500mg qd and additional 250mg with HD since keppra is 50% dialyzed.  - Continue modafinil 100mg bid.   - Will continue to follow. NEUROLOGY FOLLOW-UP NOTE    The patient is unable to contribute subjective information due to his neurologic injury.    REVIEW OF SYSTEMS:  unable to assess     MEDICATIONS:  acetaminophen    Suspension 650 milliGRAM(s) Oral every 6 hours PRN  acetaminophen    Suspension. 650 milliGRAM(s) Oral every 6 hours PRN  aspirin  chewable 81 milliGRAM(s) Oral daily  atorvastatin 80 milliGRAM(s) Oral at bedtime  cefTRIAXone Injectable. 2000 milliGRAM(s) IV Push every 24 hours  chlorhexidine 0.12% Liquid 15 milliLiter(s) Swish and Spit two times a day  chlorhexidine 2% Cloths 1 Application(s) Topical daily  dextrose 5%. 1000 milliLiter(s) IV Continuous <Continuous>  dextrose 50% Injectable 12.5 Gram(s) IV Push once  dextrose 50% Injectable 25 Gram(s) IV Push once  dextrose 50% Injectable 25 Gram(s) IV Push once  dextrose Gel 1 Dose(s) Oral once PRN  ergocalciferol Drops 6000 Unit(s) Oral daily  escitalopram 5 milliGRAM(s) Oral daily  fluconAZOLE IVPB 200 milliGRAM(s) IV Intermittent every 24 hours  folic acid 1 milliGRAM(s) Oral daily  glucagon  Injectable 1 milliGRAM(s) IntraMuscular once PRN  heparin  Infusion 1600 Unit(s)/Hr IV Continuous <Continuous>  hydrocortisone sodium succinate Injectable 25 milliGRAM(s) IV Push every 12 hours  insulin glargine Injectable (LANTUS) 42 Unit(s) SubCutaneous at bedtime  insulin regular  human corrective regimen sliding scale   SubCutaneous every 6 hours  insulin regular  human recombinant 10 Unit(s) SubCutaneous every 6 hours  levETIRAcetam  Solution 500 milliGRAM(s) Oral <User Schedule>  levETIRAcetam  Solution 500 milliGRAM(s) Oral every 24 hours  midodrine 15 milliGRAM(s) Oral every 8 hours  modafinil 100 milliGRAM(s) Oral <User Schedule>  multivitamin 1 Tablet(s) Oral daily  pantoprazole   Suspension 40 milliGRAM(s) Oral daily  psyllium Powder 1 Packet(s) Oral daily  thiamine 100 milliGRAM(s) Oral daily    VITAL SIGNS:  Vital Signs Last 24 Hrs  T(C): 36.8 (23 Apr 2018 10:47), Max: 37.4 (22 Apr 2018 17:34)  T(F): 98.2 (23 Apr 2018 10:47), Max: 99.4 (22 Apr 2018 17:34)  HR: 108 (23 Apr 2018 10:00) (74 - 108)  BP: 93/68 (23 Apr 2018 10:00) (71/56 - 93/68)  BP(mean): 79 (23 Apr 2018 10:00) (61 - 79)  RR: 23 (23 Apr 2018 10:00) (11 - 24)  SpO2: 100% (23 Apr 2018 11:30) (100% - 100%)    PHYSICAL EXAMINATION:  General: chronically ill appearing male in no distress  Eyes: unable to assess since patient was resisting exam keeping his eyes closed  Cardiovascular: Regular rate and rhythm; S1 and S2 Normal; No murmurs, gallops or rubs.  Neurologic:  Eyes open spontaneously, and he waved at the examiner with his right hand when we entered the room. He followed no commands and was nonverbal.   Does not respond to painful stimuli in the arms and legs.   Face symmetric.   Motor: both arm move antigravity spontaneously. He is rigid throughout, with no asymmetry. There is no cogwheeling.  Sensory:  unable to assess    Coordination:  unable to assess   Gait:   deferred    LABORATORY STUDIES:                          7.2    15.7  )-----------( 304      ( 23 Apr 2018 04:06 )             24.4     04-23    144  |  100  |  60<H>  ----------------------------<  132<H>  4.7   |  26  |  3.75<H>    Ca    9.2      23 Apr 2018 04:06  Phos  5.4     04-23  Mg     2.1     04-23    PTT - ( 23 Apr 2018 04:06 )  PTT:76.8 sec    RADIOLOGY & ADDITIONAL STUDIES:  reviewed     IMPRESSION & PLAN:    62 y/o man, HFrEF 10-15% (ischemic), MI, s/p AICD vs PPM, possible Afib, HTN, DM2 on insulin, possible CKD, and gout, who presented with a chief complaint of generalized weakness s/p septic shock likely 2/2 LE cellulitis complicated by ATN requiring dialysis. Now with AMS likely from possible brainstem infarct and/or toxic metabolic encephalopathy. Patient also has candidemia (resolving) and sepsis 2/2 klebsiella bacteremia. The patient had two episodes of left arm shaking the morning of 4/23 which is likely a tremor and less likely seizure (no EEG correlate. Scalp negative seizure is possible, but I favor tremor over seizure based on clinical appearance of the movements).     - may take off EEG 4/24 AM.  - Continue keppra 500mg qd and additional 250mg after HD since keppra is 50% dialyzed.  - Continue modafinil 100mg bid.   - recommend repeat MRI brain w/o con to check for new pathology and assist with prognostication.

## 2018-04-23 NOTE — PROGRESS NOTE ADULT - PROBLEM SELECTOR PLAN 1
Patient is a 63  year old male with acute on chronic renal failure from ischemic ATN. Patient is currently requiring hemodialysis. Patient was last dialyzed 4/21 with UF of 1.5 kg    P - Patient does not require emergent dialysis today as volume status is acceptable   Next dialysis 4/24  Please check another bladder scan. The last one documented was from a couple of weeks ago.

## 2018-04-23 NOTE — PROGRESS NOTE ADULT - SUBJECTIVE AND OBJECTIVE BOX
Patient seen and examined at bedside. Patient is a 63 year old male with a history of HFrEF 10-15% due to ischemic cardiomyopathy, s/p AICD, ?atrial fibrillation, hypertension, diabetes mellitus type 2, gout, and CKD for whom nephrology was called for GILMER from ATN requiring hemodialysis. Patient appears clinically unchanged. Patient was last dialyzed 4/21 with UF of 1.5 kg.     acetaminophen    Suspension 650 milliGRAM(s) every 6 hours PRN  acetaminophen    Suspension. 650 milliGRAM(s) every 6 hours PRN  aspirin  chewable 81 milliGRAM(s) daily  atorvastatin 80 milliGRAM(s) at bedtime  cefTRIAXone Injectable. 2000 milliGRAM(s) every 24 hours  chlorhexidine 0.12% Liquid 15 milliLiter(s) two times a day  chlorhexidine 2% Cloths 1 Application(s) daily  dextrose 5%. 1000 milliLiter(s) <Continuous>  dextrose 50% Injectable 12.5 Gram(s) once  dextrose 50% Injectable 25 Gram(s) once  dextrose 50% Injectable 25 Gram(s) once  dextrose Gel 1 Dose(s) once PRN  ergocalciferol Drops 6000 Unit(s) daily  escitalopram 5 milliGRAM(s) daily  fluconAZOLE IVPB 200 milliGRAM(s) every 24 hours  folic acid 1 milliGRAM(s) daily  glucagon  Injectable 1 milliGRAM(s) once PRN  heparin  Infusion 1600 Unit(s)/Hr <Continuous>  hydrocortisone sodium succinate Injectable 25 milliGRAM(s) every 12 hours  insulin glargine Injectable (LANTUS) 42 Unit(s) at bedtime  insulin regular  human corrective regimen sliding scale   every 6 hours  insulin regular  human recombinant 10 Unit(s) every 6 hours  midodrine 15 milliGRAM(s) every 8 hours  modafinil 100 milliGRAM(s) <User Schedule>  multivitamin 1 Tablet(s) daily  pantoprazole   Suspension 40 milliGRAM(s) daily  psyllium Powder 1 Packet(s) daily  thiamine 100 milliGRAM(s) daily    Allergies    No Known Allergies    Intolerances    T(C): , Max: 37.4 (04-22-18 @ 17:34)  T(F): , Max: 99.4 (04-22-18 @ 17:34)  HR: 108 (04-23-18 @ 10:00)  BP: 93/68 (04-23-18 @ 10:00)  BP(mean): 79 (04-23-18 @ 10:00)  RR: 23 (04-23-18 @ 10:00)  SpO2: 100% (04-23-18 @ 10:00)    04-22 @ 07:01  -  04-23 @ 07:00  --------------------------------------------------------  IN:    Enteral Tube Flush: 60 mL    heparin Infusion: 299 mL    Solution: 100 mL    Vital HN: 567 mL  Total IN: 1026 mL    OUT:    Rectal Tube: 600 mL  Total OUT: 600 mL    Total NET: 426 mL    LABS:                        7.2    15.7  )-----------( 304      ( 23 Apr 2018 04:06 )             24.4     04-23    144  |  100  |  60<H>  ----------------------------<  132<H>  4.7   |  26  |  3.75<H>    Ca    9.2      23 Apr 2018 04:06  Phos  5.4     04-23  Mg     2.1     04-23    PTT - ( 23 Apr 2018 04:06 )  PTT:76.8 sec

## 2018-04-23 NOTE — PROGRESS NOTE ADULT - ASSESSMENT
63M PMH HFrEF 10-15% (ischemic), MI, s/p AICD vs PPM, possible Afib, HTN, DM2 on insulin, possible CKD, and gout, who presented with a chief complaint of generalized weakness s/p septic shock likely 2/2 LE cellulitis complicated by ATN requiring dialysis. Now with AMS likely from brainstem infarct and/or toxic metabolic encephalopathy, now with candidemia (resolving) and sepsis 2/2 klebsiella bacteremia. He was intiially treated with meropenem, then IN: switched to zosyn, then finally to ceftriaxone when sensitivities were available. Hydrocortisone was discontinued due to infection. Patient has continued to have low grade temperatures and intermittently required levophed, hydrocortisone restarted on 4/17.    NEURO  #Toxic Metabolic Encephalopathy- Likely 2/2 acute infarct on imaging; Patient acutely unresponsive since 3/22 and has had waxing/ waning mental status since. CT, CTA negative. VEEG w/ no seizure activity. Decline in mental status likely 2/2 to underlying infection vs acute neurologic event. 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 Per neurology may have had ischemic event from hypoperfusion. Also showing disc protrusion at C3/C4 level coursing superiorly to the C2/C3 contacting the ventral spinal cord. Per neurology, this may be contributing to weakness, however would not explain change in mental status.  S/p PEG/trach on 4/4. Repeat CT head 4/11 performed due to concern for not moving LUE, showing only chronic infarctions.  - C/w Modafinil  - CT C-spine w/o contrast showing multilevel degenerative changes with mod-severe foraminal narrowing @ C3-C4  - per neuro, no acute interventions   - patient likely at new baseline mental status    #Seizures- possible seizure activity seen on VEEG this morning. Started on Keppra 1000mg after HD.  - VEEG and f/u epilepsy recommendations    ID  #Klebsiella Bacteremia- Urine, blood and sputum cx growing klebsiella. S/p Zosyn 4/14-4/15. S/p Meropenem 4/13. Sensitive to ceftriaxone.  -C/w Ceftriaxone 2g q 24 hrs (4/15-4/26)  -surveillance blood cultures NGTD   - f/u ID recs  - s/p permacath removal 4/19; cath tip negative growth     #Candidemia with candida tropicalis- Blood cultures positive for candida tropicalis on 4/7 and again on 4/9. No source identified. CTAP 4/11 negative for abscess. RUKHSANA negative for vegetations/ infection of AICD; Tunelled HD catheter removed 4/8, temp HD placed 4/10, permanent HD cath placed 4/12  - c/w Fluconazole 200mg q 24hrs (4/13-4/24)  - Surveillance fungal cultures 4/10 NGTD  - permacath removed 4/19    #UTI- urine culture from 4/11 growing klebsiella sensitive to ceftriaxone, and now bacteremia with GNR likely 2/2 klebsiella (in urine and lung).  - c/w management as per above    CARDIOVASCULAR   # Hypotension-  - c/w Midodrine 15 mg q8h to maintain SBP>65  - c/w hydrocortisone 25mg BID    # CHF with EF 10-15% 2/2 ischemic cardiomyopathy s/p AICD. Elevated BNP on admission with R sided effusion and edema. Patient with persistent pleural effusions on CXR and US and b/l dependent edema.   - Maintain negative fluid balance with dialysis  - c/w holding ACE/BB in setting of sepsis/ hypotension on midodrine    #AFIB- hx of Afib and LV thrombus on coumadin prior to admission; TTE with definity shows no LV thrombus currently   - c/w Heparin gtt  - c/w holding Metoprolol 12.5 q12h given hypotension. Will use Dig for rate control if RVR  - HR 90s-100s; goal <110    #CAD- Hx of MI and ischemic CM s/p AICD, STEMI 7/2017, was started on Aspirin and Brilinta per records from [place]  - c/w aspirin 81 and Atorvastatin 80mg qhs.     #LE Edema- LE edema with chronic venous stasis. Duplex does not show DVT    PULMONARY   #Chronic Resp failure- S/p tracheostomy 4/5. Bedside US with simple b/l pleural effusions and atelectatic lung. Less concern for infectious source given b/l effusions with no septations/loculations. Likely related to fluid overload. If clinically worsening, can consider thoracentesis for fluid studies and cultures.  - c/w daily CPAP trials, weaning to trach collar as tolerated  - c/w HD to remove excess fluid    #R/o Pneumonia- increased secretions overnight and elevated WBC. CXR with no new or worsened consolidations. F/u repeat sputum cxs.    RENAL   #Chronic renal failure- likely ischemic ATN in setting of sepsis with low intravascular volume. Unknown baseline Cr presenting with Cr 3.93 with metabolic derangements. Renal team on board, now on intermittent HD. HD catheter removed 4/8 due to fungemia, now with Permacath removed 4/19 and replaced with L subclavian HD cath.   - Last HD 4/21, c/w intermittent HD per renal recs    #Adrenal Insufficiency- BP improved immediately after hydrocortisone and able to wean off levophed  - c/w hydrocortisone 25 mg BID given marginal response to cortrysyn stimulation test    #Elevated AG- Fluctuating at low level; lactate negative, glucose has been well controlled, possibly 2/2 uremia in setting of ESRD.   - Monitor BMP    #Hyperkalemia- PT with intermittently elevated K,  no EKG changes.  - Trend K   - c/w dialysis   - Kayexalate PRN    GI   #PEG in place- C/D/I. c/w tube feeds (holding for IR procedure)  #Diarrhea likely 2/2 tube feeds, C.diff ruled out. Improving. Rectal tube in place    HEME  #Thrombocytopenia- Resolved. Most likely 2/2 zosyn or sepsis.   #Elevated INR- Improved. Unclear etiology. S/p Vit K and FFPx3. Monitor coags.    #Anemia- Likely 2/2 phlebotomy and CKD, no signs of bleeding. Prior studies showed Anemia of chronic disease.   - monitor CBC, transfuse if Hgb <7    ENDOCRINE   #Diabetes- HbA1C 8.6.  - c/w regular insulin 10 units q6  - c/w lantus 42 unit qhs  - MISS  - monitor FSG and adjust as needed     MSK  #LUE Swelling- noted to have eschar with surrounding edema which is tense, no fluctuance or warmth. F/u LUE US.     F: No IVF   E: Replete K<4 Mg<2, caution in setting of acute renal failure  N: PEG Placed 4/4, Vital 1.5 given diarrhea  DVT ppx: on heparin drip  GI ppx: PPI 40 daily  Lines: L subclavian HD cath placed 4/19  Drips: none  Full code  Dispo: MICU 63M PMH HFrEF 10-15% (ischemic), MI, s/p AICD vs PPM, possible Afib, HTN, DM2 on insulin, possible CKD, and gout, who presented with a chief complaint of generalized weakness s/p septic shock likely 2/2 LE cellulitis complicated by ATN requiring dialysis. Now with AMS likely from brainstem infarct and/or toxic metabolic encephalopathy, now with candidemia (resolving) and sepsis 2/2 klebsiella bacteremia. He was intiially treated with meropenem, then IN: switched to zosyn, then finally to ceftriaxone when sensitivities were available. Hydrocortisone was discontinued due to infection. Patient has continued to have low grade temperatures and intermittently required levophed, hydrocortisone restarted on 4/17.    NEURO  #Toxic Metabolic Encephalopathy- Likely 2/2 acute infarct on imaging; Patient acutely unresponsive since 3/22 and has had waxing/ waning mental status since. CT, CTA negative. VEEG w/ no seizure activity. Decline in mental status likely 2/2 to underlying infection vs acute neurologic event. 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 Per neurology may have had ischemic event from hypoperfusion. Also showing disc protrusion at C3/C4 level coursing superiorly to the C2/C3 contacting the ventral spinal cord. Per neurology, this may be contributing to weakness, however would not explain change in mental status.  S/p PEG/trach on 4/4. Repeat CT head 4/11 performed due to concern for not moving LUE, showing only chronic infarctions. CT C-spine w/o contrast showing multilevel degenerative changes with mod-severe foraminal narrowing @ C3-C4  - C/w Modafinil  - per neuro, no acute interventions   - patient likely at new baseline mental status  - consider palliative care consult  - goal for placement at [facility]    #Seizures- possible seizure activity seen on VEEG 4/22. Started on Keppra 1000mg after HD.  - VEEG  -f/u epilepsy recommendations    ID  #Klebsiella Bacteremia- Urine, blood and sputum cx growing klebsiella. S/p Zosyn 4/14-4/15. S/p Meropenem 4/13. Sensitive to ceftriaxone.  -C/w Ceftriaxone 2g q 24 hrs (4/15-4/26)  -surveillance blood cultures NGTD   - f/u ID recs  - s/p permacath removal 4/19; cath tip negative growth     #Candidemia with candida tropicalis- Blood cultures positive for candida tropicalis on 4/7 and again on 4/9. No source identified. CTAP 4/11 negative for abscess. RUKHSANA negative for vegetations/ infection of AICD; Tunelled HD catheter removed 4/8, temp HD placed 4/10, permanent HD cath placed 4/12  - c/w Fluconazole 200mg q 24hrs (4/13-4/24)  - Surveillance fungal cultures 4/10 NGTD  - permacath removed 4/19    #UTI- urine culture from 4/11 growing klebsiella sensitive to ceftriaxone, and now bacteremia with GNR likely 2/2 klebsiella (in urine and lung).  - c/w management as per above    CARDIOVASCULAR   # Hypotension  - c/w Midodrine 15 mg q8h to maintain SBP>65  - c/w hydrocortisone 25mg BID    # CHF with EF 10-15% 2/2 ischemic cardiomyopathy s/p AICD. Elevated BNP on admission with R sided effusion and edema. Patient with persistent pleural effusions on CXR and US and b/l dependent edema.   - Maintain negative fluid balance with dialysis  - c/w holding ACE/BB in setting of sepsis/ hypotension on midodrine    #AFIB- hx of Afib and LV thrombus on coumadin prior to admission; TTE with definity shows no LV thrombus currently   - c/w Heparin gtt  - c/w holding Metoprolol 12.5 q12h given hypotension. Will use Dig for rate control if RVR  - HR 90s-100s; goal <110    #CAD- Hx of MI and ischemic CM s/p AICD, STEMI 7/2017, was started on Aspirin and Brilinta per records from [place]  - c/w aspirin 81 and Atorvastatin 80mg qhs.     #LE Edema- LE edema with chronic venous stasis. Duplex does not show DVT    PULMONARY   #Chronic Resp failure- S/p tracheostomy 4/5. Bedside US with simple b/l pleural effusions and atelectatic lung. Less concern for infectious source given b/l effusions with no septations/loculations. Likely related to fluid overload. If clinically worsening, can consider thoracentesis for fluid studies and cultures.  - c/w daily CPAP trials, weaning to trach collar as tolerated  - c/w HD to remove excess fluid    #R/o Pneumonia- increased secretions overnight and elevated WBC. CXR with no new or worsened consolidations. F/u repeat sputum cxs.    RENAL   #Chronic renal failure- likely ischemic ATN in setting of sepsis with low intravascular volume. Unknown baseline Cr presenting with Cr 3.93 with metabolic derangements. Renal team on board, now on intermittent HD. HD catheter removed 4/8 due to fungemia, now with Permacath removed 4/19 and replaced with L subclavian HD cath.   - Last HD 4/21, c/w intermittent HD per renal recs    #Adrenal Insufficiency- BP improved immediately after hydrocortisone and able to wean off levophed  - c/w hydrocortisone 25 mg BID given marginal response to cortrysyn stimulation test    #Elevated AG- Fluctuating at low level; lactate negative, glucose has been well controlled, possibly 2/2 uremia in setting of ESRD.   - Monitor BMP    #Hyperkalemia- PT with intermittently elevated K,  no EKG changes.  - Trend K   - c/w dialysis   - Kayexalate PRN    GI   #PEG in place- C/D/I. c/w tube feeds (holding for IR procedure)  #Diarrhea likely 2/2 tube feeds, C.diff ruled out. Improving. Rectal tube in place    HEME  #Thrombocytopenia- Resolved. Most likely 2/2 zosyn or sepsis.   #Elevated INR- Improved. Unclear etiology. S/p Vit K and FFPx3. Monitor coags.    #Anemia- Likely 2/2 phlebotomy and CKD, no signs of bleeding. Prior studies showed Anemia of chronic disease.   - monitor CBC, transfuse if Hgb <7    ENDOCRINE   #Diabetes- HbA1C 8.6.  - c/w regular insulin 10 units q6  - c/w lantus 42 unit qhs  - MISS  - monitor FSG and adjust as needed     MSK  #LUE Swelling- noted to have eschar with surrounding edema which is tense, no fluctuance or warmth. F/u LUE US.     F: No IVF   E: Replete K<4 Mg<2, caution in setting of acute renal failure  N: PEG Placed 4/4, Vital 1.5 given diarrhea  DVT ppx: on heparin drip  GI ppx: PPI 40 daily  Lines: L subclavian HD cath placed 4/19  Drips: none  Full code  Dispo: MICU 63M PMH HFrEF 10-15% (ischemic), MI, s/p AICD vs PPM, possible Afib, HTN, DM2 on insulin, possible CKD, and gout, who presented with a chief complaint of generalized weakness s/p septic shock likely 2/2 LE cellulitis complicated by ATN requiring dialysis. Now with AMS likely from brainstem infarct and/or toxic metabolic encephalopathy, now with candidemia (resolving) and sepsis 2/2 klebsiella bacteremia. He was intiially treated with meropenem, then IN: switched to zosyn, then finally to ceftriaxone when sensitivities were available. Hydrocortisone was discontinued due to infection. Patient has continued to have low grade temperatures and intermittently required levophed, hydrocortisone restarted on 4/17.    NEURO  #Toxic Metabolic Encephalopathy- Likely 2/2 acute infarct on imaging; Patient acutely unresponsive since 3/22 and has had waxing/ waning mental status since. CT, CTA negative. VEEG w/ no seizure activity. Decline in mental status likely 2/2 to underlying infection vs acute neurologic event. 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 Per neurology may have had ischemic event from hypoperfusion. Also showing disc protrusion at C3/C4 level coursing superiorly to the C2/C3 contacting the ventral spinal cord. Per neurology, this may be contributing to weakness, however would not explain change in mental status.  S/p PEG/trach on 4/4. Repeat CT head 4/11 performed due to concern for not moving LUE, showing only chronic infarctions. CT C-spine w/o contrast showing multilevel degenerative changes with mod-severe foraminal narrowing @ C3-C4  - C/w Modafinil  - per neuro, no acute interventions   - patient likely at new baseline mental status  - consider palliative care consult  - goal for placement at [facility] when stabilized    #Seizures- possible seizure activity seen on VEEG 4/22. Started on Keppra 1000mg after HD 4/22.  - VEEG  - epilepsy following  - per epilepsy, c/w keppra 500 mg qd with extra 500 mg post HD days    ID  #Klebsiella Bacteremia- Urine, blood and sputum cx growing klebsiella. S/p Zosyn 4/14-4/15. S/p Meropenem 4/13. Sensitive to ceftriaxone.  -C/w Ceftriaxone 2g q 24 hrs (4/15-4/26)  -surveillance blood cultures NGTD   - f/u ID recs  - s/p permacath removal 4/19; cath tip negative growth   - will new need permacath placement with stabilizations of above problems    #Candidemia with candida tropicalis- Blood cultures positive for candida tropicalis on 4/7 and again on 4/9. No source identified. CTAP 4/11 negative for abscess. RUKHSANA negative for vegetations/ infection of AICD; Tunelled HD catheter removed 4/8, temp HD placed 4/10, permanent HD cath placed 4/12  - c/w Fluconazole 200mg q 24hrs (4/13-4/24)  - Surveillance fungal cultures 4/10 NGTD  - permacath removed 4/19    #UTI- urine culture from 4/11 growing klebsiella sensitive to ceftriaxone, and now bacteremia with GNR likely 2/2 klebsiella (in urine and lung).  - c/w management as per above    CARDIOVASCULAR   # Hypotension  - c/w Midodrine 15 mg q8h to maintain SBP>65  - c/w hydrocortisone 25mg BID    # CHF with EF 10-15% 2/2 ischemic cardiomyopathy s/p AICD. Elevated BNP on admission with R sided effusion and edema. Patient with persistent pleural effusions on CXR and US and b/l dependent edema.   - Maintain negative fluid balance with dialysis  - c/w holding ACE/BB in setting of sepsis/ hypotension on midodrine    #AFIB- hx of Afib and LV thrombus on coumadin prior to admission; TTE with definity shows no LV thrombus currently   - c/w Heparin gtt  - c/w holding Metoprolol 12.5 q12h given hypotension. Will use Dig for rate control if RVR  - HR 90s-100s; goal <110    #CAD- Hx of MI and ischemic CM s/p AICD, STEMI 7/2017, was started on Aspirin and Brilinta per records from [place]  - c/w aspirin 81 and Atorvastatin 80mg qhs.     #LE Edema- LE edema with chronic venous stasis. Duplex does not show DVT    PULMONARY   #Chronic Resp failure- S/p tracheostomy 4/5. Bedside US with simple b/l pleural effusions and atelectatic lung. Less concern for infectious source given b/l effusions with no septations/loculations. Likely related to fluid overload. If clinically worsening, can consider thoracentesis for fluid studies and cultures.  - c/w daily CPAP trials, weaning to trach collar as tolerated  - c/w HD to remove excess fluid    #R/o Pneumonia- increased secretions overnight and elevated WBC. CXR with no new or worsened consolidations. F/u repeat sputum cxs.    RENAL   #Chronic renal failure- likely ischemic ATN in setting of sepsis with low intravascular volume. Unknown baseline Cr presenting with Cr 3.93 with metabolic derangements. Renal team on board, now on intermittent HD. HD catheter removed 4/8 due to fungemia, now with Permacath removed 4/19 and replaced with L subclavian HD cath.   - Last HD 4/21, c/w intermittent HD per renal recs    #Adrenal Insufficiency- BP improved immediately after hydrocortisone and able to wean off levophed  - c/w hydrocortisone 25 mg BID given marginal response to cortrysyn stimulation test    #Elevated AG- Fluctuating at low level; lactate negative, glucose has been well controlled, possibly 2/2 uremia in setting of ESRD.   - Monitor BMP    #Hyperkalemia- PT with intermittently elevated K,  no EKG changes.  - Trend K   - c/w dialysis   - Kayexalate PRN    GI   #PEG in place- C/D/I. c/w tube feeds (holding for IR procedure)  #Diarrhea likely 2/2 tube feeds, C.diff ruled out. Improving. Rectal tube in place    HEME  #Thrombocytopenia- Resolved. Most likely 2/2 zosyn or sepsis.   #Elevated INR- Improved. Unclear etiology. S/p Vit K and FFPx3. Monitor coags.    #Anemia- Likely 2/2 phlebotomy and CKD, no signs of bleeding. Prior studies showed Anemia of chronic disease.   - monitor CBC, transfuse if Hgb <7    ENDOCRINE   #Diabetes- HbA1C 8.6.  - c/w regular insulin 10 units q6  - c/w lantus 42 unit qhs  - MISS  - monitor FSG and adjust as needed     MSK  #LUE Swelling- noted to have eschar with surrounding edema which is tense, no fluctuance or warmth. F/u LUE US.     F: No IVF   E: Replete K<4 Mg<2, caution in setting of acute renal failure  N: PEG Placed 4/4, Vital 1.5 given diarrhea  DVT ppx: on heparin drip  GI ppx: PPI 40 daily  Lines: L subclavian HD cath placed 4/19  Drips: none  Full code  Dispo: MICU

## 2018-04-23 NOTE — PROGRESS NOTE ADULT - SUBJECTIVE AND OBJECTIVE BOX
INTERVAL HPI/OVERNIGHT EVENTS:    SUBJECTIVE: Patient seen and examined at bedside.     CONSTITUTIONAL: No weakness, fevers or chills  EYES/ENT: No visual changes;  No vertigo or throat pain   NECK: No pain or stiffness  RESPIRATORY: No cough, wheezing, hemoptysis; No shortness of breath  CARDIOVASCULAR: No chest pain or palpitations  GASTROINTESTINAL: No abdominal or epigastric pain. No nausea, vomiting, or hematemesis; No diarrhea or constipation. No melena or hematochezia.  GENITOURINARY: No dysuria, frequency or hematuria  NEUROLOGICAL: No numbness or weakness  SKIN: No itching, rashes    OBJECTIVE:    VITAL SIGNS:  ICU Vital Signs Last 24 Hrs  T(C): 37.1 (23 Apr 2018 01:30), Max: 37.4 (22 Apr 2018 17:34)  T(F): 98.8 (23 Apr 2018 01:30), Max: 99.4 (22 Apr 2018 17:34)  HR: 75 (23 Apr 2018 05:25) (74 - 114)  BP: 72/57 (23 Apr 2018 05:00) (72/57 - 98/72)  BP(mean): 62 (23 Apr 2018 05:00) (62 - 84)  ABP: --  ABP(mean): --  RR: 11 (23 Apr 2018 05:25) (11 - 24)  SpO2: 100% (23 Apr 2018 05:25) (100% - 100%)    Mode: AC/ CMV (Assist Control/ Continuous Mandatory Ventilation), RR (machine): 10, TV (machine): 500, FiO2: 40, PEEP: 5, ITime: 1, MAP: 8, PIP: 19    04-21 @ 07:01 - 04-22 @ 07:00  --------------------------------------------------------  IN: 1800 mL / OUT: 1750 mL / NET: 50 mL    04-22 @ 07:01  - 04-23 @ 06:26  --------------------------------------------------------  IN: 1026 mL / OUT: 600 mL / NET: 426 mL      CAPILLARY BLOOD GLUCOSE      POCT Blood Glucose.: 189 mg/dL (22 Apr 2018 23:10)      PHYSICAL EXAM:    General: NAD  HEENT: NC/AT; PERRL, clear conjunctiva  Neck: supple  Respiratory: CTA b/l  Cardiovascular: +S1/S2; RRR  Abdomen: soft, NT/ND; +BS x4  Extremities: WWP, 2+ peripheral pulses b/l; no LE edema  Skin: normal color and turgor; no rash  Neurological:    MEDICATIONS:  MEDICATIONS  (STANDING):  aspirin  chewable 81 milliGRAM(s) Oral daily  atorvastatin 80 milliGRAM(s) Oral at bedtime  cefTRIAXone Injectable. 2000 milliGRAM(s) IV Push every 24 hours  chlorhexidine 0.12% Liquid 15 milliLiter(s) Swish and Spit two times a day  chlorhexidine 2% Cloths 1 Application(s) Topical daily  dextrose 5%. 1000 milliLiter(s) (50 mL/Hr) IV Continuous <Continuous>  dextrose 50% Injectable 12.5 Gram(s) IV Push once  dextrose 50% Injectable 25 Gram(s) IV Push once  dextrose 50% Injectable 25 Gram(s) IV Push once  ergocalciferol Drops 6000 Unit(s) Oral daily  escitalopram 5 milliGRAM(s) Oral daily  fluconAZOLE IVPB 200 milliGRAM(s) IV Intermittent every 24 hours  folic acid 1 milliGRAM(s) Oral daily  heparin  Infusion 1600 Unit(s)/Hr (17 mL/Hr) IV Continuous <Continuous>  hydrocortisone sodium succinate Injectable 25 milliGRAM(s) IV Push every 12 hours  insulin glargine Injectable (LANTUS) 42 Unit(s) SubCutaneous at bedtime  insulin regular  human corrective regimen sliding scale   SubCutaneous every 6 hours  insulin regular  human recombinant 10 Unit(s) SubCutaneous every 6 hours  midodrine 15 milliGRAM(s) Oral every 8 hours  modafinil 100 milliGRAM(s) Oral <User Schedule>  multivitamin 1 Tablet(s) Oral daily  pantoprazole   Suspension 40 milliGRAM(s) Oral daily  psyllium Powder 1 Packet(s) Oral daily  thiamine 100 milliGRAM(s) Oral daily    MEDICATIONS  (PRN):  acetaminophen    Suspension 650 milliGRAM(s) Oral every 6 hours PRN For Temp greater than 38 C (100.4 F)  acetaminophen    Suspension. 650 milliGRAM(s) Oral every 6 hours PRN Moderate Pain (4 - 6)  dextrose Gel 1 Dose(s) Oral once PRN Blood Glucose LESS THAN 70 milliGRAM(s)/deciliter  glucagon  Injectable 1 milliGRAM(s) IntraMuscular once PRN Glucose LESS THAN 70 milligrams/deciliter      ALLERGIES:  Allergies    No Known Allergies    Intolerances        LABS:                        7.2    15.7  )-----------( 304      ( 23 Apr 2018 04:06 )             24.4     04-23    144  |  100  |  60<H>  ----------------------------<  132<H>  4.7   |  26  |  3.75<H>    Ca    9.2      23 Apr 2018 04:06  Phos  5.4     04-23  Mg     2.1     04-23      PTT - ( 23 Apr 2018 04:06 )  PTT:76.8 sec      RADIOLOGY & ADDITIONAL TESTS: Reviewed. INTERVAL HPI/OVERNIGHT EVENTS: ptt's therapeutic, ELSIE    SUBJECTIVE: Patient seen and examined at bedside. NAD. No respiratory distress. Resting comfortably in bed on ventilator. Arousable to verbal and tactile stimuli. VEEG in place. Nodding to some questions. Not following commands. Unable to assess ROS.     OBJECTIVE:    VITAL SIGNS:  ICU Vital Signs Last 24 Hrs  T(C): 37.1 (23 Apr 2018 01:30), Max: 37.4 (22 Apr 2018 17:34)  T(F): 98.8 (23 Apr 2018 01:30), Max: 99.4 (22 Apr 2018 17:34)  HR: 75 (23 Apr 2018 05:25) (74 - 114)  BP: 72/57 (23 Apr 2018 05:00) (72/57 - 98/72)  BP(mean): 62 (23 Apr 2018 05:00) (62 - 84)  ABP: --  ABP(mean): --  RR: 11 (23 Apr 2018 05:25) (11 - 24)  SpO2: 100% (23 Apr 2018 05:25) (100% - 100%)    Mode: AC/ CMV (Assist Control/ Continuous Mandatory Ventilation), RR (machine): 10, TV (machine): 500, FiO2: 40, PEEP: 5, ITime: 1, MAP: 8, PIP: 19    04-21 @ 07:01  -  04-22 @ 07:00  --------------------------------------------------------  IN: 1800 mL / OUT: 1750 mL / NET: 50 mL    04-22 @ 07:01  -  04-23 @ 06:26  --------------------------------------------------------  IN: 1026 mL / OUT: 600 mL / NET: 426 mL      CAPILLARY BLOOD GLUCOSE      POCT Blood Glucose.: 189 mg/dL (22 Apr 2018 23:10)      PHYSICAL EXAM:    General: NAD, cachectic, chronically ill-appearing, no respiratory distress   HEENT: NC/AT, PERRL, MM dry  Neck: supple, trach in place with some mucoid secretions, no blood at L HD catheter  Respiratory: coarse breath sounds b/l, upper airway secretions, no wheezing, no retractions or labored breathing  Cardiovascular: tachycardic, +S1/S2, RRR, no MRG  Abdomen: soft, mildly distended and tympanic to percussion, nontender, +BS, PEG in place C/D/I  Extremities: WWP, 1+ pitting edema to level of thigh b/l, LUE with eschar on medial surface of forearm with surrounding tense edema, no fluctuance  Vasc: 2+ peripheral pulses b/l  Skin: dry, b/l LE with peeling skin  Neurological: nonverbal, not following commands, RUE flexed and rigid, LUE rigid, no spontaneous movement of b/l LE, alert and awake, tremor of RUE and facial muscles     MEDICATIONS:  MEDICATIONS  (STANDING):  aspirin  chewable 81 milliGRAM(s) Oral daily  atorvastatin 80 milliGRAM(s) Oral at bedtime  cefTRIAXone Injectable. 2000 milliGRAM(s) IV Push every 24 hours  chlorhexidine 0.12% Liquid 15 milliLiter(s) Swish and Spit two times a day  chlorhexidine 2% Cloths 1 Application(s) Topical daily  dextrose 5%. 1000 milliLiter(s) (50 mL/Hr) IV Continuous <Continuous>  dextrose 50% Injectable 12.5 Gram(s) IV Push once  dextrose 50% Injectable 25 Gram(s) IV Push once  dextrose 50% Injectable 25 Gram(s) IV Push once  ergocalciferol Drops 6000 Unit(s) Oral daily  escitalopram 5 milliGRAM(s) Oral daily  fluconAZOLE IVPB 200 milliGRAM(s) IV Intermittent every 24 hours  folic acid 1 milliGRAM(s) Oral daily  heparin  Infusion 1600 Unit(s)/Hr (17 mL/Hr) IV Continuous <Continuous>  hydrocortisone sodium succinate Injectable 25 milliGRAM(s) IV Push every 12 hours  insulin glargine Injectable (LANTUS) 42 Unit(s) SubCutaneous at bedtime  insulin regular  human corrective regimen sliding scale   SubCutaneous every 6 hours  insulin regular  human recombinant 10 Unit(s) SubCutaneous every 6 hours  midodrine 15 milliGRAM(s) Oral every 8 hours  modafinil 100 milliGRAM(s) Oral <User Schedule>  multivitamin 1 Tablet(s) Oral daily  pantoprazole   Suspension 40 milliGRAM(s) Oral daily  psyllium Powder 1 Packet(s) Oral daily  thiamine 100 milliGRAM(s) Oral daily    MEDICATIONS  (PRN):  acetaminophen    Suspension 650 milliGRAM(s) Oral every 6 hours PRN For Temp greater than 38 C (100.4 F)  acetaminophen    Suspension. 650 milliGRAM(s) Oral every 6 hours PRN Moderate Pain (4 - 6)  dextrose Gel 1 Dose(s) Oral once PRN Blood Glucose LESS THAN 70 milliGRAM(s)/deciliter  glucagon  Injectable 1 milliGRAM(s) IntraMuscular once PRN Glucose LESS THAN 70 milligrams/deciliter      ALLERGIES:  Allergies    No Known Allergies    Intolerances        LABS:                        7.2    15.7  )-----------( 304      ( 23 Apr 2018 04:06 )             24.4     04-23    144  |  100  |  60<H>  ----------------------------<  132<H>  4.7   |  26  |  3.75<H>    Ca    9.2      23 Apr 2018 04:06  Phos  5.4     04-23  Mg     2.1     04-23      PTT - ( 23 Apr 2018 04:06 )  PTT:76.8 sec      RADIOLOGY & ADDITIONAL TESTS: Reviewed.

## 2018-04-24 LAB
ANION GAP SERPL CALC-SCNC: 22 MMOL/L — HIGH (ref 5–17)
APTT BLD: 62.3 SEC — HIGH (ref 27.5–37.4)
APTT BLD: >200 SEC — CRITICAL HIGH (ref 27.5–37.4)
BASOPHILS NFR BLD AUTO: 0 % — SIGNIFICANT CHANGE UP (ref 0–2)
BUN SERPL-MCNC: 67 MG/DL — HIGH (ref 7–23)
CALCIUM SERPL-MCNC: 9 MG/DL — SIGNIFICANT CHANGE UP (ref 8.4–10.5)
CHLORIDE SERPL-SCNC: 99 MMOL/L — SIGNIFICANT CHANGE UP (ref 96–108)
CO2 SERPL-SCNC: 23 MMOL/L — SIGNIFICANT CHANGE UP (ref 22–31)
CREAT SERPL-MCNC: 4.15 MG/DL — HIGH (ref 0.5–1.3)
CULTURE RESULTS: SIGNIFICANT CHANGE UP
EOSINOPHIL NFR BLD AUTO: 0 % — SIGNIFICANT CHANGE UP (ref 0–6)
GLUCOSE BLDC GLUCOMTR-MCNC: 110 MG/DL — HIGH (ref 70–99)
GLUCOSE BLDC GLUCOMTR-MCNC: 134 MG/DL — HIGH (ref 70–99)
GLUCOSE BLDC GLUCOMTR-MCNC: 146 MG/DL — HIGH (ref 70–99)
GLUCOSE BLDC GLUCOMTR-MCNC: 152 MG/DL — HIGH (ref 70–99)
GLUCOSE BLDC GLUCOMTR-MCNC: 165 MG/DL — HIGH (ref 70–99)
GLUCOSE BLDC GLUCOMTR-MCNC: 80 MG/DL — SIGNIFICANT CHANGE UP (ref 70–99)
GLUCOSE SERPL-MCNC: 113 MG/DL — HIGH (ref 70–99)
GRAM STN FLD: SIGNIFICANT CHANGE UP
HCT VFR BLD CALC: 24.3 % — LOW (ref 39–50)
HGB BLD-MCNC: 7.3 G/DL — LOW (ref 13–17)
LYMPHOCYTES # BLD AUTO: 5.1 % — LOW (ref 13–44)
MAGNESIUM SERPL-MCNC: 2.2 MG/DL — SIGNIFICANT CHANGE UP (ref 1.6–2.6)
MCHC RBC-ENTMCNC: 28.4 PG — SIGNIFICANT CHANGE UP (ref 27–34)
MCHC RBC-ENTMCNC: 30 G/DL — LOW (ref 32–36)
MCV RBC AUTO: 94.6 FL — SIGNIFICANT CHANGE UP (ref 80–100)
MONOCYTES NFR BLD AUTO: 1.3 % — LOW (ref 2–14)
NEUTROPHILS NFR BLD AUTO: 93.6 % — HIGH (ref 43–77)
PHOSPHATE SERPL-MCNC: 6.3 MG/DL — HIGH (ref 2.5–4.5)
PLATELET # BLD AUTO: 370 K/UL — SIGNIFICANT CHANGE UP (ref 150–400)
POTASSIUM SERPL-MCNC: 4.6 MMOL/L — SIGNIFICANT CHANGE UP (ref 3.5–5.3)
POTASSIUM SERPL-SCNC: 4.6 MMOL/L — SIGNIFICANT CHANGE UP (ref 3.5–5.3)
RBC # BLD: 2.57 M/UL — LOW (ref 4.2–5.8)
RBC # FLD: 19.2 % — HIGH (ref 10.3–16.9)
SODIUM SERPL-SCNC: 144 MMOL/L — SIGNIFICANT CHANGE UP (ref 135–145)
SPECIMEN SOURCE: SIGNIFICANT CHANGE UP
SPECIMEN SOURCE: SIGNIFICANT CHANGE UP
WBC # BLD: 16.8 K/UL — HIGH (ref 3.8–10.5)
WBC # FLD AUTO: 16.8 K/UL — HIGH (ref 3.8–10.5)

## 2018-04-24 PROCEDURE — 99233 SBSQ HOSP IP/OBS HIGH 50: CPT

## 2018-04-24 PROCEDURE — 95951: CPT | Mod: 26,52

## 2018-04-24 RX ORDER — INSULIN HUMAN 100 [IU]/ML
7 INJECTION, SOLUTION SUBCUTANEOUS ONCE
Qty: 0 | Refills: 0 | Status: DISCONTINUED | OUTPATIENT
Start: 2018-04-24 | End: 2018-04-24

## 2018-04-24 RX ORDER — ERYTHROPOIETIN 10000 [IU]/ML
7000 INJECTION, SOLUTION INTRAVENOUS; SUBCUTANEOUS ONCE
Qty: 0 | Refills: 0 | Status: COMPLETED | OUTPATIENT
Start: 2018-04-24 | End: 2018-04-24

## 2018-04-24 RX ORDER — DOXERCALCIFEROL 2.5 UG/1
2 CAPSULE ORAL ONCE
Qty: 0 | Refills: 0 | Status: COMPLETED | OUTPATIENT
Start: 2018-04-24 | End: 2018-04-24

## 2018-04-24 RX ORDER — INSULIN HUMAN 100 [IU]/ML
7 INJECTION, SOLUTION SUBCUTANEOUS EVERY 6 HOURS
Qty: 0 | Refills: 0 | Status: DISCONTINUED | OUTPATIENT
Start: 2018-04-24 | End: 2018-04-30

## 2018-04-24 RX ORDER — ALBUMIN HUMAN 25 %
50 VIAL (ML) INTRAVENOUS ONCE
Qty: 0 | Refills: 0 | Status: DISCONTINUED | OUTPATIENT
Start: 2018-04-24 | End: 2018-04-24

## 2018-04-24 RX ORDER — FLUDROCORTISONE ACETATE 0.1 MG/1
0.1 TABLET ORAL DAILY
Qty: 0 | Refills: 0 | Status: DISCONTINUED | OUTPATIENT
Start: 2018-04-24 | End: 2018-04-27

## 2018-04-24 RX ORDER — INSULIN GLARGINE 100 [IU]/ML
21 INJECTION, SOLUTION SUBCUTANEOUS AT BEDTIME
Qty: 0 | Refills: 0 | Status: DISCONTINUED | OUTPATIENT
Start: 2018-04-24 | End: 2018-04-27

## 2018-04-24 RX ORDER — ALBUMIN HUMAN 25 %
50 VIAL (ML) INTRAVENOUS
Qty: 0 | Refills: 0 | Status: COMPLETED | OUTPATIENT
Start: 2018-04-24 | End: 2018-04-24

## 2018-04-24 RX ADMIN — Medication 1 PACKET(S): at 14:21

## 2018-04-24 RX ADMIN — INSULIN HUMAN 7 UNIT(S): 100 INJECTION, SOLUTION SUBCUTANEOUS at 17:41

## 2018-04-24 RX ADMIN — PANTOPRAZOLE SODIUM 40 MILLIGRAM(S): 20 TABLET, DELAYED RELEASE ORAL at 14:20

## 2018-04-24 RX ADMIN — ERGOCALCIFEROL 6000 UNIT(S): 1.25 CAPSULE ORAL at 14:21

## 2018-04-24 RX ADMIN — ATORVASTATIN CALCIUM 80 MILLIGRAM(S): 80 TABLET, FILM COATED ORAL at 21:06

## 2018-04-24 RX ADMIN — Medication 50 MILLILITER(S): at 11:58

## 2018-04-24 RX ADMIN — MODAFINIL 100 MILLIGRAM(S): 200 TABLET ORAL at 14:19

## 2018-04-24 RX ADMIN — CHLORHEXIDINE GLUCONATE 15 MILLILITER(S): 213 SOLUTION TOPICAL at 10:29

## 2018-04-24 RX ADMIN — ESCITALOPRAM OXALATE 5 MILLIGRAM(S): 10 TABLET, FILM COATED ORAL at 14:20

## 2018-04-24 RX ADMIN — Medication 1 MILLIGRAM(S): at 14:19

## 2018-04-24 RX ADMIN — Medication 100 MILLIGRAM(S): at 14:23

## 2018-04-24 RX ADMIN — MIDODRINE HYDROCHLORIDE 15 MILLIGRAM(S): 2.5 TABLET ORAL at 21:06

## 2018-04-24 RX ADMIN — MIDODRINE HYDROCHLORIDE 15 MILLIGRAM(S): 2.5 TABLET ORAL at 06:16

## 2018-04-24 RX ADMIN — CEFTRIAXONE 2000 MILLIGRAM(S): 500 INJECTION, POWDER, FOR SOLUTION INTRAMUSCULAR; INTRAVENOUS at 17:41

## 2018-04-24 RX ADMIN — Medication 6 MILLIGRAM(S): at 14:19

## 2018-04-24 RX ADMIN — INSULIN HUMAN 10 UNIT(S): 100 INJECTION, SOLUTION SUBCUTANEOUS at 00:00

## 2018-04-24 RX ADMIN — INSULIN HUMAN 2: 100 INJECTION, SOLUTION SUBCUTANEOUS at 23:56

## 2018-04-24 RX ADMIN — FLUDROCORTISONE ACETATE 0.1 MILLIGRAM(S): 0.1 TABLET ORAL at 17:41

## 2018-04-24 RX ADMIN — Medication 50 MILLILITER(S): at 10:57

## 2018-04-24 RX ADMIN — CHLORHEXIDINE GLUCONATE 1 APPLICATION(S): 213 SOLUTION TOPICAL at 06:18

## 2018-04-24 RX ADMIN — INSULIN HUMAN 2: 100 INJECTION, SOLUTION SUBCUTANEOUS at 00:00

## 2018-04-24 RX ADMIN — Medication 1 TABLET(S): at 14:18

## 2018-04-24 RX ADMIN — Medication 81 MILLIGRAM(S): at 14:19

## 2018-04-24 RX ADMIN — MODAFINIL 100 MILLIGRAM(S): 200 TABLET ORAL at 06:16

## 2018-04-24 RX ADMIN — MIDODRINE HYDROCHLORIDE 15 MILLIGRAM(S): 2.5 TABLET ORAL at 14:18

## 2018-04-24 RX ADMIN — Medication 50 MILLILITER(S): at 13:02

## 2018-04-24 RX ADMIN — FLUCONAZOLE 100 MILLIGRAM(S): 150 TABLET ORAL at 21:05

## 2018-04-24 RX ADMIN — LEVETIRACETAM 500 MILLIGRAM(S): 250 TABLET, FILM COATED ORAL at 14:17

## 2018-04-24 RX ADMIN — INSULIN GLARGINE 21 UNIT(S): 100 INJECTION, SOLUTION SUBCUTANEOUS at 21:11

## 2018-04-24 RX ADMIN — INSULIN HUMAN 10 UNIT(S): 100 INJECTION, SOLUTION SUBCUTANEOUS at 06:22

## 2018-04-24 RX ADMIN — ERYTHROPOIETIN 7000 UNIT(S): 10000 INJECTION, SOLUTION INTRAVENOUS; SUBCUTANEOUS at 10:58

## 2018-04-24 RX ADMIN — CHLORHEXIDINE GLUCONATE 15 MILLILITER(S): 213 SOLUTION TOPICAL at 21:07

## 2018-04-24 RX ADMIN — HEPARIN SODIUM 17 UNIT(S)/HR: 5000 INJECTION INTRAVENOUS; SUBCUTANEOUS at 23:55

## 2018-04-24 RX ADMIN — HEPARIN SODIUM 17 UNIT(S)/HR: 5000 INJECTION INTRAVENOUS; SUBCUTANEOUS at 06:16

## 2018-04-24 RX ADMIN — DOXERCALCIFEROL 2 MICROGRAM(S): 2.5 CAPSULE ORAL at 10:58

## 2018-04-24 NOTE — PROGRESS NOTE ADULT - SUBJECTIVE AND OBJECTIVE BOX
INTERVAL HPI/OVERNIGHT EVENTS: ELSIE. MN FS      SUBJECTIVE: Patient seen and examined at bedside.     CONSTITUTIONAL: No weakness, fevers or chills  EYES/ENT: No visual changes;  No vertigo or throat pain   NECK: No pain or stiffness  RESPIRATORY: No cough, wheezing, hemoptysis; No shortness of breath  CARDIOVASCULAR: No chest pain or palpitations  GASTROINTESTINAL: No abdominal or epigastric pain. No nausea, vomiting, or hematemesis; No diarrhea or constipation. No melena or hematochezia.  GENITOURINARY: No dysuria, frequency or hematuria  NEUROLOGICAL: No numbness or weakness  SKIN: No itching, rashes    OBJECTIVE:    VITAL SIGNS:  ICU Vital Signs Last 24 Hrs  T(C): 36.9 (24 Apr 2018 01:15), Max: 36.9 (23 Apr 2018 18:00)  T(F): 98.4 (24 Apr 2018 01:15), Max: 98.5 (23 Apr 2018 18:00)  HR: 92 (24 Apr 2018 06:00) (74 - 108)  BP: 97/76 (24 Apr 2018 05:00) (77/59 - 103/80)  BP(mean): 90 (24 Apr 2018 06:00) (64 - 90)  ABP: --  ABP(mean): --  RR: 23 (24 Apr 2018 06:00) (3 - 23)  SpO2: 100% (24 Apr 2018 06:00) (95% - 100%)    Mode: AC/ CMV (Assist Control/ Continuous Mandatory Ventilation), RR (machine): 10, TV (machine): 500, FiO2: 40, PEEP: 5, ITime: 1, MAP: 13, PIP: 21    04-22 @ 07:01 - 04-23 @ 07:00  --------------------------------------------------------  IN: 1026 mL / OUT: 600 mL / NET: 426 mL    04-23 @ 07:01 - 04-24 @ 06:25  --------------------------------------------------------  IN: 480 mL / OUT: 0 mL / NET: 480 mL      CAPILLARY BLOOD GLUCOSE      POCT Blood Glucose.: 110 mg/dL (24 Apr 2018 06:21)      PHYSICAL EXAM:    General: NAD  HEENT: NC/AT; PERRL, clear conjunctiva  Neck: supple  Respiratory: CTA b/l  Cardiovascular: +S1/S2; RRR  Abdomen: soft, NT/ND; +BS x4  Extremities: WWP, 2+ peripheral pulses b/l; no LE edema  Skin: normal color and turgor; no rash  Neurological:    MEDICATIONS:  MEDICATIONS  (STANDING):  aspirin  chewable 81 milliGRAM(s) Oral daily  atorvastatin 80 milliGRAM(s) Oral at bedtime  cefTRIAXone Injectable. 2000 milliGRAM(s) IV Push every 24 hours  chlorhexidine 0.12% Liquid 15 milliLiter(s) Swish and Spit two times a day  chlorhexidine 2% Cloths 1 Application(s) Topical daily  dextrose 5%. 1000 milliLiter(s) (50 mL/Hr) IV Continuous <Continuous>  dextrose 50% Injectable 12.5 Gram(s) IV Push once  dextrose 50% Injectable 25 Gram(s) IV Push once  dextrose 50% Injectable 25 Gram(s) IV Push once  ergocalciferol Drops 6000 Unit(s) Oral daily  escitalopram 5 milliGRAM(s) Oral daily  fluconAZOLE IVPB 200 milliGRAM(s) IV Intermittent every 24 hours  folic acid 1 milliGRAM(s) Oral daily  heparin  Infusion 1600 Unit(s)/Hr (17 mL/Hr) IV Continuous <Continuous>  hydrocortisone sodium succinate Injectable 25 milliGRAM(s) IV Push every 12 hours  insulin glargine Injectable (LANTUS) 42 Unit(s) SubCutaneous at bedtime  insulin regular  human corrective regimen sliding scale   SubCutaneous every 6 hours  insulin regular  human recombinant 10 Unit(s) SubCutaneous every 6 hours  levETIRAcetam  Solution 250 milliGRAM(s) Oral <User Schedule>  levETIRAcetam  Solution 500 milliGRAM(s) Oral every 24 hours  midodrine 15 milliGRAM(s) Oral every 8 hours  modafinil 100 milliGRAM(s) Oral <User Schedule>  multivitamin 1 Tablet(s) Oral daily  pantoprazole   Suspension 40 milliGRAM(s) Oral daily  psyllium Powder 1 Packet(s) Oral daily  thiamine 100 milliGRAM(s) Oral daily    MEDICATIONS  (PRN):  acetaminophen    Suspension 650 milliGRAM(s) Oral every 6 hours PRN For Temp greater than 38 C (100.4 F)  acetaminophen    Suspension. 650 milliGRAM(s) Oral every 6 hours PRN Moderate Pain (4 - 6)  dextrose Gel 1 Dose(s) Oral once PRN Blood Glucose LESS THAN 70 milliGRAM(s)/deciliter  glucagon  Injectable 1 milliGRAM(s) IntraMuscular once PRN Glucose LESS THAN 70 milligrams/deciliter      ALLERGIES:  Allergies    No Known Allergies    Intolerances        LABS:                        7.3    16.8  )-----------( 370      ( 24 Apr 2018 05:32 )             24.3     04-24    144  |  99  |  67<H>  ----------------------------<  113<H>  4.6   |  23  |  4.15<H>    Ca    9.0      24 Apr 2018 05:32  Phos  6.3     04-24  Mg     2.2     04-24      PTT - ( 23 Apr 2018 04:06 )  PTT:76.8 sec      RADIOLOGY & ADDITIONAL TESTS: Reviewed. INTERVAL HPI/OVERNIGHT EVENTS: ELSIE.    SUBJECTIVE: Patient seen and examined at bedside. NAD. No respiratory distress. Resting comfortably in bed. Arousable to verbal and tactile stimuli. Nonverbal. Not following commands. Unable to assess ROS.     OBJECTIVE:    VITAL SIGNS:  ICU Vital Signs Last 24 Hrs  T(C): 36.9 (24 Apr 2018 01:15), Max: 36.9 (23 Apr 2018 18:00)  T(F): 98.4 (24 Apr 2018 01:15), Max: 98.5 (23 Apr 2018 18:00)  HR: 92 (24 Apr 2018 06:00) (74 - 108)  BP: 97/76 (24 Apr 2018 05:00) (77/59 - 103/80)  BP(mean): 90 (24 Apr 2018 06:00) (64 - 90)  ABP: --  ABP(mean): --  RR: 23 (24 Apr 2018 06:00) (3 - 23)  SpO2: 100% (24 Apr 2018 06:00) (95% - 100%)    Mode: AC/ CMV (Assist Control/ Continuous Mandatory Ventilation), RR (machine): 10, TV (machine): 500, FiO2: 40, PEEP: 5, ITime: 1, MAP: 13, PIP: 21    04-22 @ 07:01  -  04-23 @ 07:00  --------------------------------------------------------  IN: 1026 mL / OUT: 600 mL / NET: 426 mL    04-23 @ 07:01  -  04-24 @ 06:25  --------------------------------------------------------  IN: 480 mL / OUT: 0 mL / NET: 480 mL      CAPILLARY BLOOD GLUCOSE      POCT Blood Glucose.: 110 mg/dL (24 Apr 2018 06:21)      PHYSICAL EXAM:    General: NAD, cachectic, chronically ill-appearing, no respiratory distress   HEENT: NC/AT, PERRL, MM dry  Neck: supple, trach in place, no blood at L HD catheter  Respiratory: coarse breath sounds b/l, upper airway secretions, no wheezing, no retractions or labored breathing  Cardiovascular: +S1/S2, RRR, no MRG  Abdomen: soft, mildly distended, though improved, nontender, +BS, PEG in place C/D/I  Extremities: WWP, 1+ pitting edema to level of thigh b/l, LUE with eschar on medial surface of forearm with surrounding tense edema, no fluctuance  Vasc: 2+ peripheral pulses b/l  Skin: dry, b/l LE with peeling skin  Neurological: nonverbal, not following commands, RUE flexed and rigid, LUE rigid, no spontaneous movement of b/l LE, alert and awake, tremor of RUE and facial muscles     MEDICATIONS:  MEDICATIONS  (STANDING):  aspirin  chewable 81 milliGRAM(s) Oral daily  atorvastatin 80 milliGRAM(s) Oral at bedtime  cefTRIAXone Injectable. 2000 milliGRAM(s) IV Push every 24 hours  chlorhexidine 0.12% Liquid 15 milliLiter(s) Swish and Spit two times a day  chlorhexidine 2% Cloths 1 Application(s) Topical daily  dextrose 5%. 1000 milliLiter(s) (50 mL/Hr) IV Continuous <Continuous>  dextrose 50% Injectable 12.5 Gram(s) IV Push once  dextrose 50% Injectable 25 Gram(s) IV Push once  dextrose 50% Injectable 25 Gram(s) IV Push once  ergocalciferol Drops 6000 Unit(s) Oral daily  escitalopram 5 milliGRAM(s) Oral daily  fluconAZOLE IVPB 200 milliGRAM(s) IV Intermittent every 24 hours  folic acid 1 milliGRAM(s) Oral daily  heparin  Infusion 1600 Unit(s)/Hr (17 mL/Hr) IV Continuous <Continuous>  hydrocortisone sodium succinate Injectable 25 milliGRAM(s) IV Push every 12 hours  insulin glargine Injectable (LANTUS) 42 Unit(s) SubCutaneous at bedtime  insulin regular  human corrective regimen sliding scale   SubCutaneous every 6 hours  insulin regular  human recombinant 10 Unit(s) SubCutaneous every 6 hours  levETIRAcetam  Solution 250 milliGRAM(s) Oral <User Schedule>  levETIRAcetam  Solution 500 milliGRAM(s) Oral every 24 hours  midodrine 15 milliGRAM(s) Oral every 8 hours  modafinil 100 milliGRAM(s) Oral <User Schedule>  multivitamin 1 Tablet(s) Oral daily  pantoprazole   Suspension 40 milliGRAM(s) Oral daily  psyllium Powder 1 Packet(s) Oral daily  thiamine 100 milliGRAM(s) Oral daily    MEDICATIONS  (PRN):  acetaminophen    Suspension 650 milliGRAM(s) Oral every 6 hours PRN For Temp greater than 38 C (100.4 F)  acetaminophen    Suspension. 650 milliGRAM(s) Oral every 6 hours PRN Moderate Pain (4 - 6)  dextrose Gel 1 Dose(s) Oral once PRN Blood Glucose LESS THAN 70 milliGRAM(s)/deciliter  glucagon  Injectable 1 milliGRAM(s) IntraMuscular once PRN Glucose LESS THAN 70 milligrams/deciliter      ALLERGIES:  Allergies    No Known Allergies    Intolerances        LABS:                        7.3    16.8  )-----------( 370      ( 24 Apr 2018 05:32 )             24.3     04-24    144  |  99  |  67<H>  ----------------------------<  113<H>  4.6   |  23  |  4.15<H>    Ca    9.0      24 Apr 2018 05:32  Phos  6.3     04-24  Mg     2.2     04-24      PTT - ( 23 Apr 2018 04:06 )  PTT:76.8 sec      RADIOLOGY & ADDITIONAL TESTS: Reviewed.

## 2018-04-24 NOTE — PROGRESS NOTE ADULT - ASSESSMENT
63M PMH HFrEF 10-15% (ischemic), MI, s/p AICD vs PPM, possible Afib, HTN, DM2 on insulin, possible CKD, and gout, who presented with a chief complaint of generalized weakness s/p septic shock likely 2/2 LE cellulitis complicated by ATN requiring dialysis. Now with AMS likely from brainstem infarct and/or toxic metabolic encephalopathy, now with candidemia (resolving) and sepsis 2/2 klebsiella bacteremia. He was intiially treated with meropenem, then IN: switched to zosyn, then finally to ceftriaxone when sensitivities were available. Hydrocortisone was discontinued due to infection. Patient has continued to have low grade temperatures and intermittently required levophed, hydrocortisone restarted on 4/17.    NEURO  #Toxic Metabolic Encephalopathy- Likely 2/2 acute infarct on imaging; Patient acutely unresponsive since 3/22 and has had waxing/ waning mental status since. CT, CTA negative. VEEG w/ no seizure activity. Decline in mental status likely 2/2 to underlying infection vs acute neurologic event. 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 Per neurology may have had ischemic event from hypoperfusion. Also showing disc protrusion at C3/C4 level coursing superiorly to the C2/C3 contacting the ventral spinal cord. Per neurology, this may be contributing to weakness, however would not explain change in mental status.  S/p PEG/trach on 4/4. Repeat CT head 4/11 performed due to concern for not moving LUE, showing only chronic infarctions. CT C-spine w/o contrast showing multilevel degenerative changes with mod-severe foraminal narrowing @ C3-C4  - C/w Modafinil  - per neuro, no acute interventions   - patient likely at new baseline mental status  - consider palliative care consult  - goal for placement at [facility] when stabilized    #Seizures- possible seizure activity seen on VEEG 4/22. Started on Keppra 1000mg after HD 4/22.  - VEEG  - epilepsy following  - per epilepsy, c/w keppra 500 mg qd with extra 500 mg post HD days    ID  #Klebsiella Bacteremia- Urine, blood and sputum cx growing klebsiella. S/p Zosyn 4/14-4/15. S/p Meropenem 4/13. Sensitive to ceftriaxone.  -C/w Ceftriaxone 2g q 24 hrs (4/15-4/26)  -surveillance blood cultures NGTD   - f/u ID recs  - s/p permacath removal 4/19; cath tip negative growth   - will new need permacath placement with stabilizations of above problems    #Candidemia with candida tropicalis- Blood cultures positive for candida tropicalis on 4/7 and again on 4/9. No source identified. CTAP 4/11 negative for abscess. RUKHSANA negative for vegetations/ infection of AICD; Tunelled HD catheter removed 4/8, temp HD placed 4/10, permanent HD cath placed 4/12  - c/w Fluconazole 200mg q 24hrs (4/13-4/24)  - Surveillance fungal cultures 4/10 NGTD  - permacath removed 4/19    #UTI- urine culture from 4/11 growing klebsiella sensitive to ceftriaxone, and now bacteremia with GNR likely 2/2 klebsiella (in urine and lung).  - c/w management as per above    CARDIOVASCULAR   # Hypotension  - c/w Midodrine 15 mg q8h to maintain SBP>65  - c/w hydrocortisone 25mg BID    # CHF with EF 10-15% 2/2 ischemic cardiomyopathy s/p AICD. Elevated BNP on admission with R sided effusion and edema. Patient with persistent pleural effusions on CXR and US and b/l dependent edema.   - Maintain negative fluid balance with dialysis  - c/w holding ACE/BB in setting of sepsis/ hypotension on midodrine    #AFIB- hx of Afib and LV thrombus on coumadin prior to admission; TTE with definity shows no LV thrombus currently   - c/w Heparin gtt  - c/w holding Metoprolol 12.5 q12h given hypotension. Will use Dig for rate control if RVR  - HR 90s-100s; goal <110    #CAD- Hx of MI and ischemic CM s/p AICD, STEMI 7/2017, was started on Aspirin and Brilinta per records from [place]  - c/w aspirin 81 and Atorvastatin 80mg qhs.     #LE Edema- LE edema with chronic venous stasis. Duplex does not show DVT    PULMONARY   #Chronic Resp failure- S/p tracheostomy 4/5. Bedside US with simple b/l pleural effusions and atelectatic lung. Less concern for infectious source given b/l effusions with no septations/loculations. Likely related to fluid overload. If clinically worsening, can consider thoracentesis for fluid studies and cultures.  - c/w daily CPAP trials, weaning to trach collar as tolerated  - c/w HD to remove excess fluid    #R/o Pneumonia- increased secretions overnight and elevated WBC. CXR with no new or worsened consolidations. F/u repeat sputum cxs.    RENAL   #Chronic renal failure- likely ischemic ATN in setting of sepsis with low intravascular volume. Unknown baseline Cr presenting with Cr 3.93 with metabolic derangements. Renal team on board, now on intermittent HD. HD catheter removed 4/8 due to fungemia, now with Permacath removed 4/19 and replaced with L subclavian HD cath.   - Last HD 4/21, c/w intermittent HD per renal recs    #Adrenal Insufficiency- BP improved immediately after hydrocortisone and able to wean off levophed  - c/w hydrocortisone 25 mg BID given marginal response to cortrysyn stimulation test    #Elevated AG- Fluctuating at low level; lactate negative, glucose has been well controlled, possibly 2/2 uremia in setting of ESRD.   - Monitor BMP    #Hyperkalemia- PT with intermittently elevated K,  no EKG changes.  - Trend K   - c/w dialysis   - Kayexalate PRN    GI   #PEG in place- C/D/I. c/w tube feeds (holding for IR procedure)  #Diarrhea likely 2/2 tube feeds, C.diff ruled out. Improving. Rectal tube in place    HEME  #Thrombocytopenia- Resolved. Most likely 2/2 zosyn or sepsis.   #Elevated INR- Improved. Unclear etiology. S/p Vit K and FFPx3. Monitor coags.    #Anemia- Likely 2/2 phlebotomy and CKD, no signs of bleeding. Prior studies showed Anemia of chronic disease.   - monitor CBC, transfuse if Hgb <7    ENDOCRINE   #Diabetes- HbA1C 8.6.  - c/w regular insulin 10 units q6  - c/w lantus 42 unit qhs  - MISS  - monitor FSG and adjust as needed     MSK  #LUE Swelling- noted to have eschar with surrounding edema which is tense, no fluctuance or warmth. F/u LUE US.     F: No IVF   E: Replete K<4 Mg<2, caution in setting of acute renal failure  N: PEG Placed 4/4, Vital 1.5 given diarrhea  DVT ppx: on heparin drip  GI ppx: PPI 40 daily  Lines: L subclavian HD cath placed 4/19  Drips: none  Full code  Dispo: MICU 63M PMH HFrEF 10-15% (ischemic), MI, s/p AICD vs PPM, possible Afib, HTN, DM2 on insulin, possible CKD, and gout, who presented with a chief complaint of generalized weakness s/p septic shock likely 2/2 LE cellulitis complicated by ATN requiring dialysis. Now with AMS likely from brainstem infarct and/or toxic metabolic encephalopathy, now with candidemia (resolving) and sepsis 2/2 klebsiella bacteremia. He was intiially treated with meropenem, then IN: switched to zosyn, then finally to ceftriaxone when sensitivities were available. Hydrocortisone was discontinued due to infection. Patient has continued to have low grade temperatures and intermittently required levophed, hydrocortisone restarted on 4/17.    NEURO  #Toxic Metabolic Encephalopathy- Likely 2/2 acute infarct on imaging; Patient acutely unresponsive since 3/22 and has had waxing/ waning mental status since. CT, CTA negative. VEEG w/ no seizure activity. Decline in mental status likely 2/2 to underlying infection vs acute neurologic event. 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 Per neurology may have had ischemic event from hypoperfusion. Also showing disc protrusion at C3/C4 level coursing superiorly to the C2/C3 contacting the ventral spinal cord. Per neurology, this may be contributing to weakness, however would not explain change in mental status.  S/p PEG/trach on 4/4. Repeat CT head 4/11 performed due to concern for not moving LUE, showing only chronic infarctions. CT C-spine w/o contrast showing multilevel degenerative changes with mod-severe foraminal narrowing @ C3-C4  - C/w Modafinil  - per neuro, no acute interventions   - patient at new baseline mental status  - consider palliative care consult  - goal for placement at [facility] when stabilized    #Seizures- possible seizure activity seen on VEEG 4/22. Started on Keppra 1000mg after HD 4/22.  - VEEG can be removed today   - epilepsy following f/u recs  - c/w keppra 500 mg qd with extra 500 mg post HD days    ID  #Klebsiella Bacteremia- Urine, blood and sputum cx growing klebsiella. S/p Zosyn 4/14-4/15. S/p Meropenem 4/13. Sensitive to ceftriaxone.  -C/w Ceftriaxone 2g q 24 hrs (4/15-4/26)  -surveillance blood cultures NGTD   - f/u ID recs  - s/p permacath removal 4/19; cath tip negative growth   - will new need permacath placement with stabilizations of above problems    #Candidemia with candida tropicalis- Blood cultures positive for candida tropicalis on 4/7 and again on 4/9. No source identified. CTAP 4/11 negative for abscess. RUKHSANA negative for vegetations/ infection of AICD; Tunelled HD catheter removed 4/8, temp HD placed 4/10, permanent HD cath placed 4/12  - c/w Fluconazole 200mg q 24hrs (4/13-4/24)  - Surveillance fungal cultures 4/10 NGTD  - permacath removed 4/19    #UTI- urine culture from 4/11 growing klebsiella sensitive to ceftriaxone, and now bacteremia with GNR likely 2/2 klebsiella (in urine and lung).  - c/w management as per above    CARDIOVASCULAR   # Hypotension  - c/w Midodrine 15 mg q8h to maintain SBP>65  - c/w hydrocortisone 25mg BID    # CHF with EF 10-15% 2/2 ischemic cardiomyopathy s/p AICD. Elevated BNP on admission with R sided effusion and edema. Patient with persistent pleural effusions on CXR and US and b/l dependent edema.   - Maintain negative fluid balance with dialysis  - c/w holding ACE/BB in setting of sepsis/ hypotension on midodrine    #AFIB- hx of Afib and LV thrombus on coumadin prior to admission; TTE with definity shows no LV thrombus currently   - c/w Heparin gtt  - c/w holding Metoprolol 12.5 q12h given hypotension. Will use Dig for rate control if RVR  - HR 90s-100s; goal <110    #CAD- Hx of MI and ischemic CM s/p AICD, STEMI 7/2017, was started on Aspirin and Brilinta per records from [place]  - c/w aspirin 81 and Atorvastatin 80mg qhs.     #LE Edema- LE edema with chronic venous stasis. Duplex does not show DVT    PULMONARY   #Chronic Resp failure- S/p tracheostomy 4/5. Bedside US with simple b/l pleural effusions and atelectatic lung. Less concern for infectious source given b/l effusions with no septations/loculations. Likely related to fluid overload. If clinically worsening, can consider thoracentesis for fluid studies and cultures.  - c/w daily CPAP trials, weaning to trach collar as tolerated  - c/w HD to remove excess fluid    #R/o Pneumonia- increased secretions overnight and elevated WBC. CXR with no new or worsened consolidations. F/u repeat sputum cxs.    RENAL   #Chronic renal failure- likely ischemic ATN in setting of sepsis with low intravascular volume. Unknown baseline Cr presenting with Cr 3.93 with metabolic derangements. Renal team on board, now on intermittent HD. HD catheter removed 4/8 due to fungemia, now with Permacath removed 4/19 and replaced with L subclavian HD cath.   - Last HD 4/21, c/w intermittent HD per renal recs  - will need new permacath placement     #Adrenal Insufficiency- BP improved immediately after hydrocortisone and able to wean off levophed  - c/w hydrocortisone 25 mg BID given marginal response to cortrysyn stimulation test    #Elevated AG- Fluctuating at low level; lactate negative, glucose has been well controlled, possibly 2/2 uremia in setting of ESRD.   - Monitor BMP    #Hyperkalemia- PT with intermittently elevated K,  no EKG changes.  - Trend K   - c/w dialysis   - Kayexalate PRN    GI   #PEG in place- C/D/I. c/w tube feeds (holding for IR procedure)  #Diarrhea likely 2/2 tube feeds, C.diff ruled out. Improving. Rectal tube in place    HEME  #Thrombocytopenia- Resolved. Most likely 2/2 zosyn or sepsis.   #Elevated INR- Improved. Unclear etiology. S/p Vit K and FFPx3. Monitor coags.    #Anemia- Likely 2/2 phlebotomy and CKD, no signs of bleeding. Prior studies showed Anemia of chronic disease.   - monitor CBC, transfuse if Hgb <7    ENDOCRINE   #Diabetes- HbA1C 8.6.  - c/w regular insulin 10 units q6  - c/w lantus 42 unit qhs  - MISS  - monitor FSG and adjust as needed     MSK  #LUE Swelling- noted to have eschar with surrounding edema which is tense, no fluctuance or warmth. F/u LUE US.     F: No IVF   E: Replete K<4 Mg<2, caution in setting of acute renal failure  N: PEG Placed 4/4, Vital 1.5 given diarrhea  DVT ppx: on heparin drip  GI ppx: PPI 40 daily  Lines: L subclavian HD cath placed 4/19  Drips: none  Full code  Dispo: MICU 63M PMH HFrEF 10-15% (ischemic), MI, s/p AICD vs PPM, possible Afib, HTN, DM2 on insulin, possible CKD, and gout, who presented with a chief complaint of generalized weakness s/p septic shock likely 2/2 LE cellulitis complicated by ATN requiring dialysis. Now with AMS likely from brainstem infarct and/or toxic metabolic encephalopathy, now with candidemia (resolving) and sepsis 2/2 klebsiella bacteremia. He was intiially treated with meropenem, then IN: switched to zosyn, then finally to ceftriaxone when sensitivities were available. Hydrocortisone was discontinued due to infection. Patient has continued to have low grade temperatures and intermittently required levophed, hydrocortisone restarted on 4/17.    NEURO  #Toxic Metabolic Encephalopathy- Likely 2/2 acute infarct on imaging; Patient acutely unresponsive since 3/22 and has had waxing/ waning mental status since. CT, CTA negative. VEEG w/ no seizure activity. Decline in mental status likely 2/2 to underlying infection vs acute neurologic event. 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 Per neurology may have had ischemic event from hypoperfusion. Also showing disc protrusion at C3/C4 level coursing superiorly to the C2/C3 contacting the ventral spinal cord. Per neurology, this may be contributing to weakness, however would not explain change in mental status.  S/p PEG/trach on 4/4. Repeat CT head 4/11 performed due to concern for not moving LUE, showing only chronic infarctions. CT C-spine w/o contrast showing multilevel degenerative changes with mod-severe foraminal narrowing @ C3-C4  - C/w Modafinil  - per neuro, no acute interventions   - patient at new baseline mental status  - consider palliative care consult  - goal for placement at [facility] when stabilized  - MRI head repeat for prognostication    #Seizures- possible seizure activity seen on VEEG 4/22. Started on Keppra 1000mg after HD 4/22.  - VEEG can be removed today   - epilepsy following f/u recs  - c/w keppra 500 mg qd with extra 250 mg post HD days    ID  #Klebsiella Bacteremia- Urine, blood and sputum cx growing klebsiella. S/p Zosyn 4/14-4/15. S/p Meropenem 4/13. Sensitive to ceftriaxone.  -C/w Ceftriaxone 2g q 24 hrs (4/15-4/26)  -surveillance blood cultures NGTD   - f/u ID recs  - s/p permacath removal 4/19; cath tip negative growth   - permacath planned for 4/25    #Candidemia with candida tropicalis- Blood cultures positive for candida tropicalis on 4/7 and again on 4/9. No source identified. CTAP 4/11 negative for abscess. RUKHSANA negative for vegetations/ infection of AICD; Tunelled HD catheter removed 4/8, temp HD placed 4/10, permanent HD cath placed 4/12  - c/w Fluconazole 200mg q 24hrs (4/13-4/24)  - Surveillance fungal cultures 4/10 NGTD  - permacath removed 4/19    #UTI- urine culture from 4/11 growing klebsiella sensitive to ceftriaxone, and now bacteremia with GNR likely 2/2 klebsiella (in urine and lung).  - c/w management as per above    CARDIOVASCULAR   # Hypotension  - c/w Midodrine 15 mg q8h to maintain SBP>65  - c/w hydrocortisone 25mg BID-->transition to fludrocortisone 0.1mg qd and prednisone 6mg BID for oral regimen    # CHF with EF 10-15% 2/2 ischemic cardiomyopathy s/p AICD. Elevated BNP on admission with R sided effusion and edema. Patient with persistent pleural effusions on CXR and US and b/l dependent edema.   - Maintain negative fluid balance with dialysis  - c/w holding ACE/BB in setting of sepsis/ hypotension on midodrine    #AFIB- hx of Afib and LV thrombus on coumadin prior to admission; TTE with definity shows no LV thrombus currently   - c/w Heparin gtt  - c/w holding Metoprolol 12.5 q12h given hypotension. Will use Dig for rate control if RVR  - HR 90s-100s; goal <110    #CAD- Hx of MI and ischemic CM s/p AICD, STEMI 7/2017, was started on Aspirin and Brilinta per records from [place]  - c/w aspirin 81 and Atorvastatin 80mg qhs.     #LE Edema- LE edema with chronic venous stasis. Duplex does not show DVT    PULMONARY   #Chronic Resp failure- S/p tracheostomy 4/5. Bedside US with simple b/l pleural effusions and atelectatic lung. Less concern for infectious source given b/l effusions with no septations/loculations. Likely related to fluid overload. If clinically worsening, can consider thoracentesis for fluid studies and cultures.  - c/w daily CPAP trials, weaning to trach collar as tolerated  - c/w HD to remove excess fluid  - HD today 4/24    #R/o Pneumonia- increased secretions overnight and elevated WBC. CXR with no new or worsened consolidations. F/u repeat sputum cxs.    RENAL   #Chronic renal failure- likely ischemic ATN in setting of sepsis with low intravascular volume. Unknown baseline Cr presenting with Cr 3.93 with metabolic derangements. Renal team on board, now on intermittent HD. HD catheter removed 4/8 due to fungemia, now with Permacath removed 4/19 and replaced with L subclavian HD cath.   - Last HD 4/21, c/w intermittent HD per renal recs  - will need new permacath placement tomorrow    #Adrenal Insufficiency- BP improved immediately after hydrocortisone and able to wean off levophed  - c/w hydrocortisone 25 mg BID given marginal response to cortrysyn stimulation test-->transition to fludrocortisone and prednisone for PO regimen    #Elevated AG- Fluctuating at low level; lactate negative, glucose has been well controlled, possibly 2/2 uremia in setting of ESRD.   - Monitor BMP    #Hyperkalemia- PT with intermittently elevated K,  no EKG changes.  - Trend K   - c/w dialysis   - Kayexalate PRN    GI   #PEG in place- C/D/I. c/w tube feeds (holding for IR procedure)  #Diarrhea likely 2/2 tube feeds, C.diff ruled out. Improving. Rectal tube in place--remove today    HEME  #Thrombocytopenia- Resolved. Most likely 2/2 zosyn or sepsis.   #Elevated INR- Improved. Unclear etiology. S/p Vit K and FFPx3. Monitor coags.    #Anemia- Likely 2/2 phlebotomy and CKD, no signs of bleeding. Prior studies showed Anemia of chronic disease.   - monitor CBC, transfuse if Hgb <7    ENDOCRINE   #Diabetes- HbA1C 8.6.  - c/w regular insulin 10 units q6  - c/w lantus 42 unit qhs  - MISS  - monitor FSG and adjust as needed     MSK  #LUE Swelling- noted to have eschar with surrounding edema which is tense, no fluctuance or warmth. F/u LUE US.     F: No IVF   E: Replete K<4 Mg<2, caution in setting of acute renal failure  N: PEG Placed 4/4, Vital 1.5 given diarrhea, NPO MN for permacath tomorrow  DVT ppx: on heparin drip  GI ppx: PPI 40 daily  Lines: L subclavian HD cath placed 4/19  Drips: none  Full code  Dispo: MICU

## 2018-04-24 NOTE — PROGRESS NOTE ADULT - SUBJECTIVE AND OBJECTIVE BOX
Patient seen and examined at bedside. Patient is a 63 year old male with a history of HFrEF 10-15% due to ischemic cardiomyopathy, s/p AICD, ?atrial fibrillation, hypertension, diabetes mellitus type 2, gout, and CKD for whom nephrology was called for GILMER from ATN requiring hemodialysis. Patient is currently on VEEG. Patient is otherwise unchanged. Patient was last dialyzed 4/21.     acetaminophen    Suspension 650 milliGRAM(s) every 6 hours PRN  acetaminophen    Suspension. 650 milliGRAM(s) every 6 hours PRN  albumin human 25% IVPB 50 milliLiter(s) every 1 hour  aspirin  chewable 81 milliGRAM(s) daily  atorvastatin 80 milliGRAM(s) at bedtime  cefTRIAXone Injectable. 2000 milliGRAM(s) every 24 hours  chlorhexidine 0.12% Liquid 15 milliLiter(s) two times a day  chlorhexidine 2% Cloths 1 Application(s) daily  dextrose 5%. 1000 milliLiter(s) <Continuous>  dextrose 50% Injectable 12.5 Gram(s) once  dextrose 50% Injectable 25 Gram(s) once  dextrose 50% Injectable 25 Gram(s) once  dextrose Gel 1 Dose(s) once PRN  doxercalciferol Injectable 2 MICROGram(s) once  epoetin amy Injectable 7000 Unit(s) once  ergocalciferol Drops 6000 Unit(s) daily  escitalopram 5 milliGRAM(s) daily  fluconAZOLE IVPB 200 milliGRAM(s) every 24 hours  fludroCORTISONE 0.1 milliGRAM(s) daily  folic acid 1 milliGRAM(s) daily  glucagon  Injectable 1 milliGRAM(s) once PRN  heparin  Infusion 1600 Unit(s)/Hr <Continuous>  insulin glargine Injectable (LANTUS) 42 Unit(s) at bedtime  insulin regular  human corrective regimen sliding scale   every 6 hours  insulin regular  human recombinant 10 Unit(s) every 6 hours  levETIRAcetam  Solution 250 milliGRAM(s) <User Schedule>  levETIRAcetam  Solution 500 milliGRAM(s) every 24 hours  midodrine 15 milliGRAM(s) every 8 hours  modafinil 100 milliGRAM(s) <User Schedule>  multivitamin 1 Tablet(s) daily  pantoprazole   Suspension 40 milliGRAM(s) daily  predniSONE   Tablet 6 milliGRAM(s) every 12 hours  psyllium Powder 1 Packet(s) daily  thiamine 100 milliGRAM(s) daily    Allergies    No Known Allergies    Intolerances    T(C): , Max: 37.1 (04-24-18 @ 06:11)  T(F): , Max: 98.8 (04-24-18 @ 06:11)  HR: 94 (04-24-18 @ 09:00)  BP: 93/73 (04-24-18 @ 09:00)  BP(mean): 79 (04-24-18 @ 09:00)  RR: 20 (04-24-18 @ 09:00)  SpO2: 100% (04-24-18 @ 09:00)    04-23 @ 07:01  -  04-24 @ 07:00  --------------------------------------------------------  IN:    Enteral Tube Flush: 60 mL    heparin Infusion: 204 mL    Other: 10 mL    Solution: 100 mL    Vital HN: 756 mL  Total IN: 1130 mL    OUT:    Rectal Tube: 100 mL  Total OUT: 100 mL    Total NET: 1030 mL    04-24 @ 07:01  -  04-24 @ 10:57  --------------------------------------------------------  IN:    heparin Infusion: 34 mL    Vital HN: 116 mL  Total IN: 150 mL    OUT:  Total OUT: 0 mL    Total NET: 150 mL    Height (cm): 190.5 (04-24 @ 10:06)    LABS:                        7.3    16.8  )-----------( 370      ( 24 Apr 2018 05:32 )             24.3     04-24    144  |  99  |  67<H>  ----------------------------<  113<H>  4.6   |  23  |  4.15<H>    Ca    9.0      24 Apr 2018 05:32  Phos  6.3     04-24  Mg     2.2     04-24    PTT - ( 24 Apr 2018 06:52 )  PTT:62.3 sec

## 2018-04-24 NOTE — PROGRESS NOTE ADULT - PROBLEM SELECTOR PLAN 1
Patient is a 63  year old male with acute on chronic renal failure from ischemic ATN. Patient is currently requiring hemodialysis. Patient was last dialyzed 4/21 with UF of 1.5 kg    P - dialysis today  Revaclear 300, , , 3K bath   Goal UF 1-1.5 kg over 3 hours   Albumin qhourly during dialysis

## 2018-04-24 NOTE — CHART NOTE - NSCHARTNOTEFT_GEN_A_CORE
Admitting Diagnosis:   63M PMH HFrEF 10-15% (ischemic), MI, s/p AICD vs PPM, possible Afib, HTN, DM2 on insulin, possible CKD, and gout, who presents with a chief complaint of generalized weakness s/p septic shock likely 2/2 LE cellulitis. AMS and new leukocytisis likely 2/2 UTI. Trach and PEG dependent.     PAST MEDICAL & SURGICAL HISTORY:  Type 2 diabetes mellitus with diabetic peripheral angiopathy without gangrene, with long-term current use  Essential hypertension, benign  Gout  Pacemaker  Chronic systolic heart failure  Myocardial infarction  No significant past surgical history      Current Nutrition Order:  Ordered for Vital 1.5 @ 63ml/hr x 24hr via PEG+ 2x prostat (1512ml TV, 2468kcal, 132g, 1155ml free H2O, 151%RDI)- running at goal   NPO after MN tonight for permcath placement       PO Intake: Good (%) [   ]  Fair (50-75%) [   ] Poor (<25%) [   ]- N/A TF dependent    GI Issues: No N/V noted by RN; diarrhea resolving, rectal tube to be removed per MD note     Pain: Unable to assess at this time 2/2 vent     Skin Integrity:   L buttock stage 2 PU  L calf venous ulcer  Trach stage 3 PU    Labs:       144  |  99  |  67<H>  ----------------------------<  113<H>  4.6   |  23  |  4.15<H>    Ca    9.0      2018 05:32  Phos  6.3       Mg     2.2     -      CAPILLARY BLOOD GLUCOSE      POCT Blood Glucose.: 80 mg/dL (2018 11:24)  POCT Blood Glucose.: 110 mg/dL (2018 06:21)  POCT Blood Glucose.: 152 mg/dL (2018 00:12)  POCT Blood Glucose.: 100 mg/dL (2018 16:39)      Medications:  MEDICATIONS  (STANDING):  aspirin  chewable 81 milliGRAM(s) Oral daily  atorvastatin 80 milliGRAM(s) Oral at bedtime  cefTRIAXone Injectable. 2000 milliGRAM(s) IV Push every 24 hours  chlorhexidine 0.12% Liquid 15 milliLiter(s) Swish and Spit two times a day  chlorhexidine 2% Cloths 1 Application(s) Topical daily  dextrose 5%. 1000 milliLiter(s) (50 mL/Hr) IV Continuous <Continuous>  dextrose 50% Injectable 12.5 Gram(s) IV Push once  dextrose 50% Injectable 25 Gram(s) IV Push once  dextrose 50% Injectable 25 Gram(s) IV Push once  ergocalciferol Drops 6000 Unit(s) Oral daily  escitalopram 5 milliGRAM(s) Oral daily  fluconAZOLE IVPB 200 milliGRAM(s) IV Intermittent every 24 hours  fludroCORTISONE 0.1 milliGRAM(s) Oral daily  folic acid 1 milliGRAM(s) Oral daily  heparin  Infusion 1600 Unit(s)/Hr (17 mL/Hr) IV Continuous <Continuous>  insulin glargine Injectable (LANTUS) 42 Unit(s) SubCutaneous at bedtime  insulin regular  human corrective regimen sliding scale   SubCutaneous every 6 hours  insulin regular  human recombinant 7 Unit(s) SubCutaneous every 6 hours  levETIRAcetam  Solution 250 milliGRAM(s) Oral <User Schedule>  levETIRAcetam  Solution 500 milliGRAM(s) Oral every 24 hours  midodrine 15 milliGRAM(s) Oral every 8 hours  modafinil 100 milliGRAM(s) Oral <User Schedule>  multivitamin 1 Tablet(s) Oral daily  pantoprazole   Suspension 40 milliGRAM(s) Oral daily  predniSONE   Tablet 6 milliGRAM(s) Oral every 12 hours  psyllium Powder 1 Packet(s) Oral daily  thiamine 100 milliGRAM(s) Oral daily    MEDICATIONS  (PRN):  acetaminophen    Suspension 650 milliGRAM(s) Oral every 6 hours PRN For Temp greater than 38 C (100.4 F)  acetaminophen    Suspension. 650 milliGRAM(s) Oral every 6 hours PRN Moderate Pain (4 - 6)  dextrose Gel 1 Dose(s) Oral once PRN Blood Glucose LESS THAN 70 milliGRAM(s)/deciliter  glucagon  Injectable 1 milliGRAM(s) IntraMuscular once PRN Glucose LESS THAN 70 milligrams/deciliter      Weight:  Daily     Daily Weight in k.1 (2018 10:45)    Weight:  77.4kg ()  72.7kg ()  77.2kg ()  80.9 kg ()  84.5kg (3/31)  86.9kg (3/19)  94.7 kg (3/16)    Weight Change:   Weight fluctuating throughout admission d/t HD, TF inconsistencies     Estimated energy needs using 89 kg IBW; Needs estimated / vent/post-op/PU  Calories: 25-30 kcal/kg = 5150-5208 kcal/day  Protein: 1.4-1.6 g/kg = 125-142 g protein/day  Fluids: per team / HD     Subjective:   S/p trach and PEG placements on . Candidemia found in blood cultures with bandemia, likely intraabdominal source of infection. Pt seen in room, asleep, not arousable to verbal stimuli. Pt remains trached to vent, CPAP mode. MAP 73. HD currently taking place. TF currently running at goal; will be NPO at MN for HD permcath. EEG monitoring for possible seizure activity. Crista WNL. GOC discussion still pending; plan for DC to [facility] when stable.     Previous Nutrition Diagnosis:   Increased protein-calorie needs RT increased demand for protein-calorie intake AEB on vent support    Active [X]  Resolved [   ]    New PES statement:     Goal:   Continue to meet % of nutrition needs via tolerated route.     Recommendations:  1. Continue Vital 1.5 via PEG @ 63mL/hr x 24hrs plus ProStat Sugar Free BID (200 kcal, 30g protein)   2. Continue to trend weights  3 Monitor POC BG   4. Continue to monitor for GOC and keep nutrition in line at all times     Education:   N/A-vent    Risk Level: High [X] Moderate [   ] Low [   ]

## 2018-04-24 NOTE — PROGRESS NOTE ADULT - SUBJECTIVE AND OBJECTIVE BOX
Patient was seen and evaluated on dialysis.   Patient is tolerating the procedure well.   HR: 82 (04-24-18 @ 12:59)  BP: 95/74 (04-24-18 @ 10:45)  Continue dialysis:   Revaclear 300, , , 3K bath   Goal UF 1-1.5 kg over 3 hours

## 2018-04-24 NOTE — PROGRESS NOTE ADULT - ATTENDING COMMENTS
Chronically critically ill patient, well known to me, post brainstem stroke, chronic resp failure and tracheostomy in place. Clinically stable, no evidence of active infection. Continue all supportive care, social work dispo plans initiated.

## 2018-04-25 DIAGNOSIS — J96.10 CHRONIC RESPIRATORY FAILURE, UNSPECIFIED WHETHER WITH HYPOXIA OR HYPERCAPNIA: ICD-10-CM

## 2018-04-25 DIAGNOSIS — G93.40 ENCEPHALOPATHY, UNSPECIFIED: ICD-10-CM

## 2018-04-25 DIAGNOSIS — R78.81 BACTEREMIA: ICD-10-CM

## 2018-04-25 LAB
ANION GAP SERPL CALC-SCNC: 23 MMOL/L — HIGH (ref 5–17)
APTT BLD: 81.7 SEC — HIGH (ref 27.5–37.4)
APTT BLD: 82.7 SEC — HIGH (ref 27.5–37.4)
BLD GP AB SCN SERPL QL: NEGATIVE — SIGNIFICANT CHANGE UP
BUN SERPL-MCNC: 50 MG/DL — HIGH (ref 7–23)
CALCIUM SERPL-MCNC: 9.1 MG/DL — SIGNIFICANT CHANGE UP (ref 8.4–10.5)
CHLORIDE SERPL-SCNC: 93 MMOL/L — LOW (ref 96–108)
CO2 SERPL-SCNC: 24 MMOL/L — SIGNIFICANT CHANGE UP (ref 22–31)
CREAT SERPL-MCNC: 3.29 MG/DL — HIGH (ref 0.5–1.3)
CULTURE RESULTS: SIGNIFICANT CHANGE UP
GLUCOSE BLDC GLUCOMTR-MCNC: 101 MG/DL — HIGH (ref 70–99)
GLUCOSE BLDC GLUCOMTR-MCNC: 105 MG/DL — HIGH (ref 70–99)
GLUCOSE BLDC GLUCOMTR-MCNC: 108 MG/DL — HIGH (ref 70–99)
GLUCOSE BLDC GLUCOMTR-MCNC: 111 MG/DL — HIGH (ref 70–99)
GLUCOSE BLDC GLUCOMTR-MCNC: 112 MG/DL — HIGH (ref 70–99)
GLUCOSE BLDC GLUCOMTR-MCNC: 125 MG/DL — HIGH (ref 70–99)
GLUCOSE BLDC GLUCOMTR-MCNC: 158 MG/DL — HIGH (ref 70–99)
GLUCOSE BLDC GLUCOMTR-MCNC: 69 MG/DL — LOW (ref 70–99)
GLUCOSE BLDC GLUCOMTR-MCNC: 69 MG/DL — LOW (ref 70–99)
GLUCOSE BLDC GLUCOMTR-MCNC: 75 MG/DL — SIGNIFICANT CHANGE UP (ref 70–99)
GLUCOSE SERPL-MCNC: 66 MG/DL — LOW (ref 70–99)
HAV IGM SER-ACNC: SIGNIFICANT CHANGE UP
HBV CORE IGM SER-ACNC: SIGNIFICANT CHANGE UP
HBV SURFACE AG SER-ACNC: SIGNIFICANT CHANGE UP
HCT VFR BLD CALC: 27.3 % — LOW (ref 39–50)
HCV AB S/CO SERPL IA: 0.11 S/CO — SIGNIFICANT CHANGE UP
HCV AB SERPL-IMP: SIGNIFICANT CHANGE UP
HGB BLD-MCNC: 8 G/DL — LOW (ref 13–17)
INR BLD: 1.3 — HIGH (ref 0.88–1.16)
MAGNESIUM SERPL-MCNC: 2 MG/DL — SIGNIFICANT CHANGE UP (ref 1.6–2.6)
MCHC RBC-ENTMCNC: 28.5 PG — SIGNIFICANT CHANGE UP (ref 27–34)
MCHC RBC-ENTMCNC: 29.3 G/DL — LOW (ref 32–36)
MCV RBC AUTO: 97.2 FL — SIGNIFICANT CHANGE UP (ref 80–100)
PHOSPHATE SERPL-MCNC: 5.4 MG/DL — HIGH (ref 2.5–4.5)
PLATELET # BLD AUTO: 308 K/UL — SIGNIFICANT CHANGE UP (ref 150–400)
POTASSIUM SERPL-MCNC: 4.2 MMOL/L — SIGNIFICANT CHANGE UP (ref 3.5–5.3)
POTASSIUM SERPL-SCNC: 4.2 MMOL/L — SIGNIFICANT CHANGE UP (ref 3.5–5.3)
PROTHROM AB SERPL-ACNC: 14.5 SEC — HIGH (ref 9.8–12.7)
RBC # BLD: 2.81 M/UL — LOW (ref 4.2–5.8)
RBC # FLD: 18.8 % — HIGH (ref 10.3–16.9)
RH IG SCN BLD-IMP: POSITIVE — SIGNIFICANT CHANGE UP
SODIUM SERPL-SCNC: 140 MMOL/L — SIGNIFICANT CHANGE UP (ref 135–145)
SPECIMEN SOURCE: SIGNIFICANT CHANGE UP
WBC # BLD: 12.8 K/UL — HIGH (ref 3.8–10.5)
WBC # FLD AUTO: 12.8 K/UL — HIGH (ref 3.8–10.5)

## 2018-04-25 PROCEDURE — 99233 SBSQ HOSP IP/OBS HIGH 50: CPT

## 2018-04-25 PROCEDURE — 77001 FLUOROGUIDE FOR VEIN DEVICE: CPT | Mod: 26

## 2018-04-25 PROCEDURE — 36558 INSERT TUNNELED CV CATH: CPT | Mod: 58

## 2018-04-25 PROCEDURE — 93971 EXTREMITY STUDY: CPT | Mod: 26,LT

## 2018-04-25 PROCEDURE — 76937 US GUIDE VASCULAR ACCESS: CPT | Mod: 26

## 2018-04-25 RX ORDER — DEXTROSE 50 % IN WATER 50 %
25 SYRINGE (ML) INTRAVENOUS ONCE
Qty: 0 | Refills: 0 | Status: COMPLETED | OUTPATIENT
Start: 2018-04-25 | End: 2018-04-25

## 2018-04-25 RX ORDER — HEPARIN SODIUM 5000 [USP'U]/ML
1600 INJECTION INTRAVENOUS; SUBCUTANEOUS
Qty: 25000 | Refills: 0 | Status: DISCONTINUED | OUTPATIENT
Start: 2018-04-25 | End: 2018-04-28

## 2018-04-25 RX ORDER — DEXTROSE 10 % IN WATER 10 %
1000 INTRAVENOUS SOLUTION INTRAVENOUS
Qty: 0 | Refills: 0 | Status: DISCONTINUED | OUTPATIENT
Start: 2018-04-25 | End: 2018-04-26

## 2018-04-25 RX ORDER — DEXTROSE 50 % IN WATER 50 %
12.5 SYRINGE (ML) INTRAVENOUS ONCE
Qty: 0 | Refills: 0 | Status: COMPLETED | OUTPATIENT
Start: 2018-04-25 | End: 2018-04-25

## 2018-04-25 RX ORDER — WARFARIN SODIUM 2.5 MG/1
5 TABLET ORAL AT BEDTIME
Qty: 0 | Refills: 0 | Status: COMPLETED | OUTPATIENT
Start: 2018-04-25 | End: 2018-04-25

## 2018-04-25 RX ADMIN — MODAFINIL 100 MILLIGRAM(S): 200 TABLET ORAL at 07:13

## 2018-04-25 RX ADMIN — MIDODRINE HYDROCHLORIDE 15 MILLIGRAM(S): 2.5 TABLET ORAL at 13:20

## 2018-04-25 RX ADMIN — Medication 70 MILLILITER(S): at 12:00

## 2018-04-25 RX ADMIN — CHLORHEXIDINE GLUCONATE 15 MILLILITER(S): 213 SOLUTION TOPICAL at 22:22

## 2018-04-25 RX ADMIN — HEPARIN SODIUM 16 UNIT(S)/HR: 5000 INJECTION INTRAVENOUS; SUBCUTANEOUS at 18:22

## 2018-04-25 RX ADMIN — WARFARIN SODIUM 5 MILLIGRAM(S): 2.5 TABLET ORAL at 22:21

## 2018-04-25 RX ADMIN — MIDODRINE HYDROCHLORIDE 15 MILLIGRAM(S): 2.5 TABLET ORAL at 22:50

## 2018-04-25 RX ADMIN — FLUDROCORTISONE ACETATE 0.1 MILLIGRAM(S): 0.1 TABLET ORAL at 11:01

## 2018-04-25 RX ADMIN — MODAFINIL 100 MILLIGRAM(S): 200 TABLET ORAL at 13:20

## 2018-04-25 RX ADMIN — CHLORHEXIDINE GLUCONATE 1 APPLICATION(S): 213 SOLUTION TOPICAL at 07:12

## 2018-04-25 RX ADMIN — Medication 1 TABLET(S): at 11:57

## 2018-04-25 RX ADMIN — ERGOCALCIFEROL 6000 UNIT(S): 1.25 CAPSULE ORAL at 11:57

## 2018-04-25 RX ADMIN — LEVETIRACETAM 500 MILLIGRAM(S): 250 TABLET, FILM COATED ORAL at 11:01

## 2018-04-25 RX ADMIN — INSULIN GLARGINE 21 UNIT(S): 100 INJECTION, SOLUTION SUBCUTANEOUS at 22:32

## 2018-04-25 RX ADMIN — PANTOPRAZOLE SODIUM 40 MILLIGRAM(S): 20 TABLET, DELAYED RELEASE ORAL at 11:58

## 2018-04-25 RX ADMIN — Medication 12.5 GRAM(S): at 06:50

## 2018-04-25 RX ADMIN — Medication 6 MILLIGRAM(S): at 03:35

## 2018-04-25 RX ADMIN — CHLORHEXIDINE GLUCONATE 15 MILLILITER(S): 213 SOLUTION TOPICAL at 11:01

## 2018-04-25 RX ADMIN — Medication 25 MILLILITER(S): at 18:27

## 2018-04-25 RX ADMIN — Medication 6 MILLIGRAM(S): at 13:20

## 2018-04-25 RX ADMIN — Medication 81 MILLIGRAM(S): at 11:59

## 2018-04-25 RX ADMIN — Medication 1 MILLIGRAM(S): at 11:57

## 2018-04-25 RX ADMIN — ATORVASTATIN CALCIUM 80 MILLIGRAM(S): 80 TABLET, FILM COATED ORAL at 22:21

## 2018-04-25 RX ADMIN — MIDODRINE HYDROCHLORIDE 15 MILLIGRAM(S): 2.5 TABLET ORAL at 07:13

## 2018-04-25 RX ADMIN — Medication 100 MILLIGRAM(S): at 11:57

## 2018-04-25 RX ADMIN — Medication 25 MILLILITER(S): at 11:59

## 2018-04-25 RX ADMIN — Medication 1 PACKET(S): at 11:57

## 2018-04-25 RX ADMIN — ESCITALOPRAM OXALATE 5 MILLIGRAM(S): 10 TABLET, FILM COATED ORAL at 11:57

## 2018-04-25 RX ADMIN — Medication 70 MILLILITER(S): at 18:32

## 2018-04-25 RX ADMIN — CEFTRIAXONE 2000 MILLIGRAM(S): 500 INJECTION, POWDER, FOR SOLUTION INTRAMUSCULAR; INTRAVENOUS at 18:28

## 2018-04-25 NOTE — PROGRESS NOTE ADULT - PROBLEM SELECTOR PLAN 10
VTE: Coumadin  GI PPx: PPI    FULL CODE    F: No IVF  E: Replete electrolytes to maintain K>4 and Mg>2  N:  NPO for permacath, restart tube feeds with Vital 1.5 Terrell at 63cc/hr    Dispo: Transfer to [facility] for further management of hypotension in setting of shock, acute encephalopathy, bacteremia and acute on chronic respiratory failure. VTE: Coumadin  GI PPx: PPI    FULL CODE    F: No IVF  E: Replete electrolytes to maintain K>4 and Mg>2  N:  NPO for permacath, restart tube feeds with Vital 1.5 Terrell at 63cc/hr    Dispo: Transfer to [facility] for further management of hypotension in setting of shock, acute encephalopathy, bacteremia and acute on chronic respiratory failure. Dispo with stabilization for placement at [facility].

## 2018-04-25 NOTE — PROGRESS NOTE ADULT - PROBLEM SELECTOR PLAN 3
Patient acutely unresponsive since 3/22 with waxing/ waning mental status. CT, CTA negative. VEEG w/ no seizure activity. Decline in mental status likely 2/2 to underlying infection vs acute neurologic event. 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 Per neurology may have had ischemic event from hypoperfusion. Also showing disc protrusion at C3/C4 level coursing superiorly to the C2/C3 contacting the ventral spinal cord. Per neurology, this may be contributing to weakness, however would not explain change in mental status.  S/p PEG/trach on 4/4. Repeat CT head 4/11 performed due to concern for not moving LUE, showing only chronic infarctions. CT C-spine w/o contrast showing multilevel degenerative changes with mod-severe foraminal narrowing @ C3-C4  - C/w Modafinil  - General neurology following with no interventions at this time.  - patient at new baseline mental status  - goal for placement at [facility] when stabilized  - Pending repeat MRI head for prognostication of CVA Patient acutely unresponsive since 3/22 with waxing/ waning mental status. CT, CTA negative. VEEG w/ no seizure activity. Decline in mental status likely 2/2 to underlying infection vs acute neurologic event. 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 Per neurology may have had ischemic event from hypoperfusion. Also showing disc protrusion at C3/C4 level coursing superiorly to the C2/C3 contacting the ventral spinal cord. Per neurology, this may be contributing to weakness, however would not explain change in mental status.  S/p PEG/trach on 4/4. Repeat CT head 4/11 performed due to concern for not moving LUE, showing only chronic infarctions. CT C-spine w/o contrast showing multilevel degenerative changes with mod-severe foraminal narrowing @ C3-C4  - C/w Modafinil  - General neurology following with no interventions at this time.  - patient at new baseline mental status- intermittent responsive to commands.   - Pending repeat MRI head for prognostication of CVA

## 2018-04-25 NOTE — PROGRESS NOTE ADULT - SUBJECTIVE AND OBJECTIVE BOX
TRANSFER ACCEPTANCE [facility] to [facility] PGY-1    Hospital Course      Interval History:   ROS:    OBJECTIVE  Vitals:  T(C): 36.8 (04-25-18 @ 15:38), Max: 37.1 (04-24-18 @ 22:33)  HR: 98 (04-25-18 @ 15:00) (80 - 99)  BP: 94/73 (04-25-18 @ 15:00) (74/55 - 94/73)  RR: 19 (04-25-18 @ 15:00) (11 - 30)  SpO2: 100% (04-25-18 @ 15:00) (94% - 100%)  Wt(kg): --  Mode: CPAP with PS  FiO2: 40  PEEP: 5  PS: 8  ITime: 1  MAP: 8  PIP: 13    I/O:  I&O's Summary    24 Apr 2018 07:01  -  25 Apr 2018 07:00  --------------------------------------------------------  IN: 1583 mL / OUT: 1700 mL / NET: -117 mL    25 Apr 2018 07:01  -  25 Apr 2018 16:46  --------------------------------------------------------  IN: 581 mL / OUT: 0 mL / NET: 581 mL        PHYSICAL EXAM:  Appearance: NAD. Speaking in full sentences.   HEENT:   Conjunctiva clear b/l. Moist oral mucosa.  Cardiovascular: RRR with no murmurs.  Respiratory: Lungs CTAB.   Gastrointestinal:  Soft, nontender. Non-distended. Non-rigid.	  Extremities: No edema b/l. No erythema b/l. LE WWP b/l.  Vascular: DP 2+ b/l.  Neurologic:  Alert and awake. Moving all extremities. Following commands. Making eye contact.  	  LABS:                        8.0    12.8  )-----------( 308      ( 25 Apr 2018 06:33 )             27.3     04-25    140  |  93<L>  |  50<H>  ----------------------------<  66<L>  4.2   |  24  |  3.29<H>    Ca    9.1      25 Apr 2018 06:33  Phos  5.4     04-25  Mg     2.0     04-25      PT/INR - ( 25 Apr 2018 06:33 )   PT: 14.5 sec;   INR: 1.30          PTT - ( 25 Apr 2018 06:33 )  PTT:82.7 sec      RADIOLOGY & ADDITIONAL TESTS:  Reviewed .    MEDICATIONS  (STANDING):  aspirin  chewable 81 milliGRAM(s) Oral daily  atorvastatin 80 milliGRAM(s) Oral at bedtime  cefTRIAXone Injectable. 2000 milliGRAM(s) IV Push every 24 hours  chlorhexidine 0.12% Liquid 15 milliLiter(s) Swish and Spit two times a day  chlorhexidine 2% Cloths 1 Application(s) Topical daily  dextrose 10%. 1000 milliLiter(s) (70 mL/Hr) IV Continuous <Continuous>  dextrose 5%. 1000 milliLiter(s) (50 mL/Hr) IV Continuous <Continuous>  dextrose 50% Injectable 12.5 Gram(s) IV Push once  dextrose 50% Injectable 25 Gram(s) IV Push once  dextrose 50% Injectable 25 Gram(s) IV Push once  ergocalciferol Drops 6000 Unit(s) Oral daily  escitalopram 5 milliGRAM(s) Oral daily  fludroCORTISONE 0.1 milliGRAM(s) Oral daily  folic acid 1 milliGRAM(s) Oral daily  insulin glargine Injectable (LANTUS) 21 Unit(s) SubCutaneous at bedtime  insulin regular  human corrective regimen sliding scale   SubCutaneous every 6 hours  insulin regular  human recombinant 7 Unit(s) SubCutaneous every 6 hours  levETIRAcetam  Solution 250 milliGRAM(s) Oral <User Schedule>  levETIRAcetam  Solution 500 milliGRAM(s) Oral every 24 hours  midodrine 15 milliGRAM(s) Oral every 8 hours  modafinil 100 milliGRAM(s) Oral <User Schedule>  multivitamin 1 Tablet(s) Oral daily  pantoprazole   Suspension 40 milliGRAM(s) Oral daily  predniSONE   Tablet 6 milliGRAM(s) Oral every 12 hours  psyllium Powder 1 Packet(s) Oral daily  thiamine 100 milliGRAM(s) Oral daily    MEDICATIONS  (PRN):  acetaminophen    Suspension 650 milliGRAM(s) Oral every 6 hours PRN For Temp greater than 38 C (100.4 F)  acetaminophen    Suspension. 650 milliGRAM(s) Oral every 6 hours PRN Moderate Pain (4 - 6)  dextrose Gel 1 Dose(s) Oral once PRN Blood Glucose LESS THAN 70 milliGRAM(s)/deciliter  glucagon  Injectable 1 milliGRAM(s) IntraMuscular once PRN Glucose LESS THAN 70 milligrams/deciliter TRANSFER ACCEPTANCE [facility] to [facility] PGY-1    Hospital Course  63M PMH HFrEF 10-15% (ischemic), MI (STEMI per [facility] records 7/17), s/p AICD, Afib, HTN, DM2 on insulin, CKD, and gout admitted for septic shock 2/2 LE cellulitis. Pt was started on levophed for hypotension and to help renal perfusion. Pt was also started on dobutamine given low mix venous saturation however had to be discontinued given tachycardia. Pt also experienced tachycardia on Milrinone. Pt was started on Vanco/ Zosyn and completed total 11 days of abx. PT was started on solucortef 50 q6h given recent hx of steroid use. Gallium scan showed only LLE  cellulitis. TTE with no vegetations. Given worsening GILMER and low UO, renal was consulted. HD cath was placed and pt was started on CVVD and eventually transitioned to intermittent HD. Pt was noted have b/l pulmonary effusion and required R chest tube placement with fluid studies showing exudative process. Given extensive cardiac hx and component of cardiogenic shock, cardiology was consulted. Pt was started on Vasopressin to improve blood pressure. Pt also stated on Isordil and Hydralazine but pt was not  able to tolerate the medications. Pt was started  on Hep gtt for Afib but was tthen placed on Argatroban given possibility of HIT. HIT was ruled out and patient was bridged to coumadin. Pt was stable for transfer to CCU for further management of cardiogenic shock however on 3/22, pt became unresponsive post A line placement. Vital signs were normal during event. Stroke code was called and he was intubated for airway protection. CT was negative MRI done 4 days later showed chronic infarcts with possible small brainstem stroke per neurology read. He was started on Modafinil however mental status did not improve significantly. VEEG showed slowing but no seizures. After GOC discussions with family patient had trach and PEG on 4/4 with plans to be discharged to [facility] when stable. On 4/8 patient was found to be fungemic with candida tropicalis and started on micafungin, then switched to fluconazole on 4/13 when sensitivities resulted. RUKHSANA was negative for vegetations or infection of AICD. CTAP also performed given transamnitis/ elevated bili but source of candidemia was not found. HD Permacath was removed 4/8 and was replaced on 4/12 when surveillance cultures had been negative for 48 hours. However overnight on 4/12 patient spiked fever again and blood, urine and sputum cultures returned positive for Klebsiella. He was started on meropenem for 1 day, then switched to Zosyn for 2 days and finally placed on ceftriaxone when sensitivities returned on 4/15. Patient restarted on hydrocortisone in attempt to wean off pressors. Thrombocytopenia resolved and patient restarted on heparin gtt for A-fib. Given continued low-grade fevers and uptrending white count, permacath removed on 4/19 and replaced with temp HD cath. The following weekend, noticed pt with seizure-like activity and VEEG placed. Per neuro, started on keppra 500 mg qd with additional 250cc keppra on post-HD days. End goal of L-tachy placement.  Transitioned to PO prednisone and fludrocortisone to replace hydrocortisone. L-tachy will not accept albumin pushes, and goal to wean patient off albumin during HD for hypotension. In addition, pt to have repeat MRI for prognostication of CVA. Permacath placed 4/25. Pt also noticed to have L forearm eschar that has enlarged and increasing fluctuance. LUE US ordered also showing R superficial thrombus; reordered saoft tissue US to eval for abscess in L forearm. Ortho for hand consulted for possible I&D. Rectal tube in place to be removed today. Pt to resume a/c with hep bridging to coumadin beginning tonight 4/25. Of note, pt is very sensitive to insulin. Has been getting nutritional insulin with basal insulin, however can become hypogylcemic requiring D10W and D50 amps with improvement in FSG. Pt remaining HDS and medically optimzied for step down to [facility].        Interval History:   ROS:    OBJECTIVE  Vitals:  T(C): 36.8 (04-25-18 @ 15:38), Max: 37.1 (04-24-18 @ 22:33)  HR: 98 (04-25-18 @ 15:00) (80 - 99)  BP: 94/73 (04-25-18 @ 15:00) (74/55 - 94/73)  RR: 19 (04-25-18 @ 15:00) (11 - 30)  SpO2: 100% (04-25-18 @ 15:00) (94% - 100%)  Wt(kg): --  Mode: CPAP with PS  FiO2: 40  PEEP: 5  PS: 8  ITime: 1  MAP: 8  PIP: 13    I/O:  I&O's Summary    24 Apr 2018 07:01  -  25 Apr 2018 07:00  --------------------------------------------------------  IN: 1583 mL / OUT: 1700 mL / NET: -117 mL    25 Apr 2018 07:01  -  25 Apr 2018 16:46  --------------------------------------------------------  IN: 581 mL / OUT: 0 mL / NET: 581 mL        PHYSICAL EXAM:  Appearance: NAD. Speaking in full sentences.   HEENT:   Conjunctiva clear b/l. Moist oral mucosa.  Cardiovascular: RRR with no murmurs.  Respiratory: Lungs CTAB.   Gastrointestinal:  Soft, nontender. Non-distended. Non-rigid.	  Extremities: No edema b/l. No erythema b/l. LE WWP b/l.  Vascular: DP 2+ b/l.  Neurologic:  Alert and awake. Moving all extremities. Following commands. Making eye contact.  	  LABS:                        8.0    12.8  )-----------( 308      ( 25 Apr 2018 06:33 )             27.3     04-25    140  |  93<L>  |  50<H>  ----------------------------<  66<L>  4.2   |  24  |  3.29<H>    Ca    9.1      25 Apr 2018 06:33  Phos  5.4     04-25  Mg     2.0     04-25      PT/INR - ( 25 Apr 2018 06:33 )   PT: 14.5 sec;   INR: 1.30          PTT - ( 25 Apr 2018 06:33 )  PTT:82.7 sec      RADIOLOGY & ADDITIONAL TESTS:  Reviewed .    MEDICATIONS  (STANDING):  aspirin  chewable 81 milliGRAM(s) Oral daily  atorvastatin 80 milliGRAM(s) Oral at bedtime  cefTRIAXone Injectable. 2000 milliGRAM(s) IV Push every 24 hours  chlorhexidine 0.12% Liquid 15 milliLiter(s) Swish and Spit two times a day  chlorhexidine 2% Cloths 1 Application(s) Topical daily  dextrose 10%. 1000 milliLiter(s) (70 mL/Hr) IV Continuous <Continuous>  dextrose 5%. 1000 milliLiter(s) (50 mL/Hr) IV Continuous <Continuous>  dextrose 50% Injectable 12.5 Gram(s) IV Push once  dextrose 50% Injectable 25 Gram(s) IV Push once  dextrose 50% Injectable 25 Gram(s) IV Push once  ergocalciferol Drops 6000 Unit(s) Oral daily  escitalopram 5 milliGRAM(s) Oral daily  fludroCORTISONE 0.1 milliGRAM(s) Oral daily  folic acid 1 milliGRAM(s) Oral daily  insulin glargine Injectable (LANTUS) 21 Unit(s) SubCutaneous at bedtime  insulin regular  human corrective regimen sliding scale   SubCutaneous every 6 hours  insulin regular  human recombinant 7 Unit(s) SubCutaneous every 6 hours  levETIRAcetam  Solution 250 milliGRAM(s) Oral <User Schedule>  levETIRAcetam  Solution 500 milliGRAM(s) Oral every 24 hours  midodrine 15 milliGRAM(s) Oral every 8 hours  modafinil 100 milliGRAM(s) Oral <User Schedule>  multivitamin 1 Tablet(s) Oral daily  pantoprazole   Suspension 40 milliGRAM(s) Oral daily  predniSONE   Tablet 6 milliGRAM(s) Oral every 12 hours  psyllium Powder 1 Packet(s) Oral daily  thiamine 100 milliGRAM(s) Oral daily    MEDICATIONS  (PRN):  acetaminophen    Suspension 650 milliGRAM(s) Oral every 6 hours PRN For Temp greater than 38 C (100.4 F)  acetaminophen    Suspension. 650 milliGRAM(s) Oral every 6 hours PRN Moderate Pain (4 - 6)  dextrose Gel 1 Dose(s) Oral once PRN Blood Glucose LESS THAN 70 milliGRAM(s)/deciliter  glucagon  Injectable 1 milliGRAM(s) IntraMuscular once PRN Glucose LESS THAN 70 milligrams/deciliter TRANSFER ACCEPTANCE [facility] to [facility] PGY-1    Hospital Course  63M PMH HFrEF 10-15% (ischemic), CAD (STEMI per [facility] records 7/17), s/p AICD, Afib, HTN, DM2 on insulin, CKD, and gout admitted for septic shock 2/2 LE cellulitis. Pt was started on levophed for hypotension and to help renal perfusion. Pt was also started on dobutamine given low mix venous saturation however had to be discontinued given tachycardia. Pt also experienced tachycardia on Milrinone. Pt was started on Vanco/ Zosyn and completed total 11 days of abx. PT was started on solucortef 50 q6h given recent hx of steroid use. Gallium scan showed only LLE  cellulitis. TTE with no vegetations. Given worsening GILMER and low UO, renal was consulted. HD cath was placed and pt was started on CVVD and eventually transitioned to intermittent HD. Pt was noted have b/l pulmonary effusion and required R chest tube placement with fluid studies showing exudative process. Given extensive cardiac hx and component of cardiogenic shock, cardiology was consulted. Pt was started on Vasopressin to improve blood pressure. Pt also stated on Isordil and Hydralazine but pt was not  able to tolerate the medications. Pt was started  on Hep gtt for Afib but was tthen placed on Argatroban given possibility of HIT. HIT was ruled out and patient was bridged to coumadin. Pt was stable for transfer to CCU for further management of cardiogenic shock however on 3/22, pt became unresponsive post A line placement. Vital signs were normal during event. Stroke code was called and he was intubated for airway protection. CT was negative MRI done 4 days later showed chronic infarcts with possible small brainstem stroke per neurology read. He was started on Modafinil however mental status did not improve significantly. VEEG showed slowing but no seizures. After GOC discussions with family patient had trach and PEG on 4/4 with plans to be discharged to [facility] when stable. On 4/8 patient was found to be fungemic with candida tropicalis and started on micafungin, then switched to fluconazole on 4/13 when sensitivities resulted. RUKHSANA was negative for vegetations or infection of AICD. CTAP also performed given transamnitis/ elevated bili but source of candidemia was not found. HD Permacath was removed 4/8 and was replaced on 4/12 when surveillance cultures had been negative for 48 hours. However overnight on 4/12 patient spiked fever again and blood, urine and sputum cultures returned positive for Klebsiella. He was started on meropenem for 1 day, then switched to Zosyn for 2 days and finally placed on ceftriaxone when sensitivities returned on 4/15. Patient restarted on hydrocortisone in attempt to wean off pressors. Thrombocytopenia resolved and patient restarted on heparin gtt for A-fib. Given continued low-grade fevers and uptrending white count, permacath removed on 4/19 and replaced with temp HD cath. The following weekend, noticed pt with seizure-like activity and VEEG placed. Per neuro, started on keppra 500 mg qd with additional 250cc keppra on post-HD days. End goal of L-tachy placement.  Transitioned to PO prednisone and fludrocortisone to replace hydrocortisone. L-tachy will not accept albumin pushes, and goal to wean patient off albumin during HD for hypotension. In addition, pt to have repeat MRI for prognostication of CVA. Permacath placed 4/25. Pt also noticed to have L forearm eschar that has enlarged and increasing fluctuance. LUE US ordered also showing R superficial thrombus; reordered saoft tissue US to eval for abscess in L forearm. Ortho for hand consulted for possible I&D. Rectal tube in place to be removed today. Pt to resume a/c with hep bridging to coumadin beginning tonight 4/25. Of note, pt is very sensitive to insulin. Has been getting nutritional insulin with basal insulin, however can become hypogylcemic requiring D10W and D50 amps with improvement in FSG. Pt remaining HDS and medically optimzied for step down to [facility].        Interval History:   ROS:    OBJECTIVE  Vitals:  T(C): 36.8 (04-25-18 @ 15:38), Max: 37.1 (04-24-18 @ 22:33)  HR: 98 (04-25-18 @ 15:00) (80 - 99)  BP: 94/73 (04-25-18 @ 15:00) (74/55 - 94/73)  RR: 19 (04-25-18 @ 15:00) (11 - 30)  SpO2: 100% (04-25-18 @ 15:00) (94% - 100%)  Wt(kg): --  Mode: CPAP with PS  FiO2: 40  PEEP: 5  PS: 8  ITime: 1  MAP: 8  PIP: 13    I/O:  I&O's Summary    24 Apr 2018 07:01  -  25 Apr 2018 07:00  --------------------------------------------------------  IN: 1583 mL / OUT: 1700 mL / NET: -117 mL    25 Apr 2018 07:01  -  25 Apr 2018 16:46  --------------------------------------------------------  IN: 581 mL / OUT: 0 mL / NET: 581 mL        PHYSICAL EXAM:  Appearance: NAD. Speaking in full sentences.   HEENT:   Conjunctiva clear b/l. Moist oral mucosa.  Cardiovascular: RRR with no murmurs.  Respiratory: Lungs CTAB.   Gastrointestinal:  Soft, nontender. Non-distended. Non-rigid.	  Extremities: No edema b/l. No erythema b/l. LE WWP b/l.  Vascular: DP 2+ b/l.  Neurologic:  Alert and awake. Moving all extremities. Following commands. Making eye contact.  	  LABS:                        8.0    12.8  )-----------( 308      ( 25 Apr 2018 06:33 )             27.3     04-25    140  |  93<L>  |  50<H>  ----------------------------<  66<L>  4.2   |  24  |  3.29<H>    Ca    9.1      25 Apr 2018 06:33  Phos  5.4     04-25  Mg     2.0     04-25      PT/INR - ( 25 Apr 2018 06:33 )   PT: 14.5 sec;   INR: 1.30          PTT - ( 25 Apr 2018 06:33 )  PTT:82.7 sec      RADIOLOGY & ADDITIONAL TESTS:  Reviewed .    MEDICATIONS  (STANDING):  aspirin  chewable 81 milliGRAM(s) Oral daily  atorvastatin 80 milliGRAM(s) Oral at bedtime  cefTRIAXone Injectable. 2000 milliGRAM(s) IV Push every 24 hours  chlorhexidine 0.12% Liquid 15 milliLiter(s) Swish and Spit two times a day  chlorhexidine 2% Cloths 1 Application(s) Topical daily  dextrose 10%. 1000 milliLiter(s) (70 mL/Hr) IV Continuous <Continuous>  dextrose 5%. 1000 milliLiter(s) (50 mL/Hr) IV Continuous <Continuous>  dextrose 50% Injectable 12.5 Gram(s) IV Push once  dextrose 50% Injectable 25 Gram(s) IV Push once  dextrose 50% Injectable 25 Gram(s) IV Push once  ergocalciferol Drops 6000 Unit(s) Oral daily  escitalopram 5 milliGRAM(s) Oral daily  fludroCORTISONE 0.1 milliGRAM(s) Oral daily  folic acid 1 milliGRAM(s) Oral daily  insulin glargine Injectable (LANTUS) 21 Unit(s) SubCutaneous at bedtime  insulin regular  human corrective regimen sliding scale   SubCutaneous every 6 hours  insulin regular  human recombinant 7 Unit(s) SubCutaneous every 6 hours  levETIRAcetam  Solution 250 milliGRAM(s) Oral <User Schedule>  levETIRAcetam  Solution 500 milliGRAM(s) Oral every 24 hours  midodrine 15 milliGRAM(s) Oral every 8 hours  modafinil 100 milliGRAM(s) Oral <User Schedule>  multivitamin 1 Tablet(s) Oral daily  pantoprazole   Suspension 40 milliGRAM(s) Oral daily  predniSONE   Tablet 6 milliGRAM(s) Oral every 12 hours  psyllium Powder 1 Packet(s) Oral daily  thiamine 100 milliGRAM(s) Oral daily    MEDICATIONS  (PRN):  acetaminophen    Suspension 650 milliGRAM(s) Oral every 6 hours PRN For Temp greater than 38 C (100.4 F)  acetaminophen    Suspension. 650 milliGRAM(s) Oral every 6 hours PRN Moderate Pain (4 - 6)  dextrose Gel 1 Dose(s) Oral once PRN Blood Glucose LESS THAN 70 milliGRAM(s)/deciliter  glucagon  Injectable 1 milliGRAM(s) IntraMuscular once PRN Glucose LESS THAN 70 milligrams/deciliter TRANSFER ACCEPTANCE [facility] to [facility] PGY-1    Hospital Course  63M PMH HFrEF 10-15% (ischemic), CAD (STEMI per [facility] records 7/17), s/p AICD, Afib, HTN, DM2 on insulin, CKD, and gout admitted for septic shock 2/2 LE cellulitis as well as concern for ATN in setting of shock. Pt was started on levophed for hypotension. Dobutamine given low mix venous saturation with concern for component of cardiogenic shock however had to be discontinued given tachycardia.  Pt also experienced tachycardia on Milrinone. Pt was started on Vanco/ Zosyn and completed total 11 days of abx. PT was started on solucortef 50 q6h given recent hx of steroid use. Gallium scan demosntrated LLE  cellulitis. TTE with no vegetations. Given worsening GILMER and low UOP, renal was consulted. HD cath was placed and pt was started on CVVD and eventually transitioned to intermittent HD. Pt was noted have b/l pulmonary effusion and required R chest tube placement with fluid studies showing exudative process. Given extensive cardiac hx and component of cardiogenic shock, cardiology was consulted. Pt was started on Vasopressin to improve blood pressure. Pt also stated on Isordil and Hydralazine but pt was not  able to tolerate the medications. Pt was started  on Hep gtt for Afib but switched to Argatroban given possibility of HIT. HIT was ruled out and patient was bridged to coumadin. Pt was stable for transfer to CCU for further management of cardiogenic shock however on 3/22, pt became unresponsive post A line placement. Vital signs were normal during event. Stroke code was called and he was intubated for airway protection. CT was negative MRI after 4 days noted to have chronic infarcts with possible small brainstem stroke per neurology read. He was started on Modafinil however mental status did not improve significantly. VEEG significant for slowing but no seizures. After GOC discussions with family patient had trach and PEG on 4/4 with plans to be discharged to [facility] when stable. On 4/8 patient was found to be fungemic with candida tropicalis and started on micafungin, then switched to fluconazole on 4/13 when sensitivities resulted. RUKHSANA was negative for vegetations or infection of AICD. CT A/P performed given transamnitis/ elevated bili but source of candidemia remains unclear. HD Permacath was removed 4/8 and was replaced on 4/12 when surveillance cultures had been negative for 48 hours. However overnight on 4/12 patient spiked fever again and blood, urine and sputum cultures returned positive for Klebsiella. He was started on meropenem for 1 day, then switched to Zosyn for 2 days and finally placed on ceftriaxone when sensitivities returned on 4/15. Patient restarted on hydrocortisone in attempt to wean off pressors. Thrombocytopenia resolved and patient restarted on heparin gtt for A-fib. Given continued low-grade fevers and uptrending white count, permacath removed on 4/19 and replaced with temp HD cath. The following weekend, noticed pt with seizure-like activity and VEEG placed. Per neuro, started on keppra 500 mg qd with additional 250cc keppra on post-HD days. End goal of L-tachy placement.  Transitioned to PO prednisone and fludrocortisone to replace hydrocortisone. L-tachy will not accept albumin pushes, and goal to wean patient off albumin during HD for hypotension. In addition, pt to have repeat MRI for prognostication of CVA. Pt also noticed to have L forearm eschar that has enlarged and increasing fluctuance. LUE US ordered also showing R superficial thrombus; reordered soft tissue US to eval for abscess in L forearm. Ortho for hand consulted for possible I&D. Rectal tube in place to be removed today. Pt to resume a/c with hep bridging to coumadin beginning tonight 4/25. Of note, noted to have increased sensitivity to insulin. Received half of previous standing for NPO and became hypoglycemic requiring D10W and D50 amps with improvement in FSG. Perhmacath replaced today.  Pt with improving medical optimization for step down to [facility].        Interval History: Nonverbal, minimal response to painful or verbal stimuli. Follow commands intermittently. On ventilator with trach and with peg.   ROS: As above.    OBJECTIVE  Vitals:  T(C): 36.8 (04-25-18 @ 15:38), Max: 37.1 (04-24-18 @ 22:33)  HR: 98 (04-25-18 @ 15:00) (80 - 99)  BP: 94/73 (04-25-18 @ 15:00) (74/55 - 94/73)  RR: 19 (04-25-18 @ 15:00) (11 - 30)  SpO2: 100% (04-25-18 @ 15:00) (94% - 100%)  Wt(kg): --  Mode: CPAP with PS  FiO2: 40  PEEP: 5  PS: 8  ITime: 1  MAP: 8  PIP: 13    I/O:  I&O's Summary    24 Apr 2018 07:01  -  25 Apr 2018 07:00  --------------------------------------------------------  IN: 1583 mL / OUT: 1700 mL / NET: -117 mL    25 Apr 2018 07:01  -  25 Apr 2018 16:46  --------------------------------------------------------  IN: 581 mL / OUT: 0 mL / NET: 581 mL        PHYSICAL EXAM:  Appearance: NAD no respiratory accessory muscle use. Nonverbal, responsive to painful and verbal stimuli.  HEENT:  PERRL. Unable to assess EOMI 2/2 to compliance. Intermittently opens eyes to voice/command. Conjunctiva clear b/l. Moist oral mucosa.  Cardiovascular: Irregularly irregular with 3/6 systolic murmur heard along upper and lower L sternal border.  Respiratory: Lungs CTAB. Trach in place, clean with no noted secretions or erythema.  Gastrointestinal:  Soft, nontender. Mild distension with noted tympany on percussion.	  Extremities: No noted edema b/l but with venous stasis changes. No erythema b/l. Extremities cool to touch.  Vascular: DP present though diminished.  Neurologic: Nonverbal with   	  LABS:                        8.0    12.8  )-----------( 308      ( 25 Apr 2018 06:33 )             27.3     04-25    140  |  93<L>  |  50<H>  ----------------------------<  66<L>  4.2   |  24  |  3.29<H>    Ca    9.1      25 Apr 2018 06:33  Phos  5.4     04-25  Mg     2.0     04-25      PT/INR - ( 25 Apr 2018 06:33 )   PT: 14.5 sec;   INR: 1.30          PTT - ( 25 Apr 2018 06:33 )  PTT:82.7 sec      RADIOLOGY & ADDITIONAL TESTS:  Reviewed .    MEDICATIONS  (STANDING):  aspirin  chewable 81 milliGRAM(s) Oral daily  atorvastatin 80 milliGRAM(s) Oral at bedtime  cefTRIAXone Injectable. 2000 milliGRAM(s) IV Push every 24 hours  chlorhexidine 0.12% Liquid 15 milliLiter(s) Swish and Spit two times a day  chlorhexidine 2% Cloths 1 Application(s) Topical daily  dextrose 10%. 1000 milliLiter(s) (70 mL/Hr) IV Continuous <Continuous>  dextrose 5%. 1000 milliLiter(s) (50 mL/Hr) IV Continuous <Continuous>  dextrose 50% Injectable 12.5 Gram(s) IV Push once  dextrose 50% Injectable 25 Gram(s) IV Push once  dextrose 50% Injectable 25 Gram(s) IV Push once  ergocalciferol Drops 6000 Unit(s) Oral daily  escitalopram 5 milliGRAM(s) Oral daily  fludroCORTISONE 0.1 milliGRAM(s) Oral daily  folic acid 1 milliGRAM(s) Oral daily  insulin glargine Injectable (LANTUS) 21 Unit(s) SubCutaneous at bedtime  insulin regular  human corrective regimen sliding scale   SubCutaneous every 6 hours  insulin regular  human recombinant 7 Unit(s) SubCutaneous every 6 hours  levETIRAcetam  Solution 250 milliGRAM(s) Oral <User Schedule>  levETIRAcetam  Solution 500 milliGRAM(s) Oral every 24 hours  midodrine 15 milliGRAM(s) Oral every 8 hours  modafinil 100 milliGRAM(s) Oral <User Schedule>  multivitamin 1 Tablet(s) Oral daily  pantoprazole   Suspension 40 milliGRAM(s) Oral daily  predniSONE   Tablet 6 milliGRAM(s) Oral every 12 hours  psyllium Powder 1 Packet(s) Oral daily  thiamine 100 milliGRAM(s) Oral daily    MEDICATIONS  (PRN):  acetaminophen    Suspension 650 milliGRAM(s) Oral every 6 hours PRN For Temp greater than 38 C (100.4 F)  acetaminophen    Suspension. 650 milliGRAM(s) Oral every 6 hours PRN Moderate Pain (4 - 6)  dextrose Gel 1 Dose(s) Oral once PRN Blood Glucose LESS THAN 70 milliGRAM(s)/deciliter  glucagon  Injectable 1 milliGRAM(s) IntraMuscular once PRN Glucose LESS THAN 70 milligrams/deciliter TRANSFER ACCEPTANCE [facility] to [facility] PGY-1    Hospital Course  63M PMH HFrEF 10-15% (ischemic), CAD (STEMI per [facility] records 7/17), s/p AICD, Afib, HTN, DM2 on insulin, CKD, and gout admitted for septic shock 2/2 LE cellulitis as well as concern for ATN in setting of shock. Pt was started on levophed for hypotension. Dobutamine given low mix venous saturation with concern for component of cardiogenic shock however had to be discontinued given tachycardia.  Pt also experienced tachycardia on Milrinone. Pt was started on Vanco/ Zosyn and completed total 11 days of abx. PT was started on solucortef 50 q6h given recent hx of steroid use. Gallium scan demosntrated LLE  cellulitis. TTE with no vegetations. Given worsening GILMER and low UOP, renal was consulted. HD cath was placed and pt was started on CVVD and eventually transitioned to intermittent HD. Pt was noted have b/l pulmonary effusion and required R chest tube placement with fluid studies showing exudative process. Given extensive cardiac hx and component of cardiogenic shock, cardiology was consulted. Pt was started on Vasopressin to improve blood pressure. Pt also stated on Isordil and Hydralazine but pt was not  able to tolerate the medications. Pt was started  on Hep gtt for Afib but switched to Argatroban given possibility of HIT. HIT was ruled out and patient was bridged to coumadin. Pt was stable for transfer to CCU for further management of cardiogenic shock however on 3/22, pt became unresponsive post A line placement. Vital signs were normal during event. Stroke code was called and he was intubated for airway protection. CT was negative MRI after 4 days noted to have chronic infarcts with possible small brainstem stroke per neurology read. He was started on Modafinil however mental status did not improve significantly. VEEG significant for slowing but no seizures. After GOC discussions with family patient had trach and PEG on 4/4 with plans to be discharged to [facility] when stable. On 4/8 patient was found to be fungemic with candida tropicalis and started on micafungin, then switched to fluconazole on 4/13 when sensitivities resulted. RUKHSANA was negative for vegetations or infection of AICD. CT A/P performed given transamnitis/ elevated bili but source of candidemia remains unclear. HD Permacath was removed 4/8 and was replaced on 4/12 when surveillance cultures had been negative for 48 hours. However overnight on 4/12 patient spiked fever again and blood, urine and sputum cultures returned positive for Klebsiella. He was started on meropenem for 1 day, then switched to Zosyn for 2 days and finally placed on ceftriaxone when sensitivities returned on 4/15. Patient restarted on hydrocortisone in attempt to wean off pressors. Thrombocytopenia resolved and patient restarted on heparin gtt for A-fib. Given continued low-grade fevers and uptrending white count, permacath removed on 4/19 and replaced with temp HD cath. The following weekend, noticed pt with seizure-like activity and VEEG placed. Per neuro, started on keppra 500 mg qd with additional 250cc keppra on post-HD days. End goal of L-tachy placement.  Transitioned to PO prednisone and fludrocortisone to replace hydrocortisone. L-tachy will not accept albumin pushes, and goal to wean patient off albumin during HD for hypotension. In addition, pt to have repeat MRI for prognostication of CVA. Pt also noticed to have L forearm eschar that has enlarged and increasing fluctuance. LUE US ordered also showing R superficial thrombus; reordered soft tissue US to eval for abscess in L forearm. Ortho for hand consulted for possible I&D. Rectal tube in place to be removed today. Pt to resume a/c with hep bridging to coumadin beginning tonight 4/25. Of note, noted to have increased sensitivity to insulin. Received half of previous standing for NPO and became hypoglycemic requiring D10W and D50 amps with improvement in FSG. Perhmacath replaced today.  Pt with improving medical optimization for step down to [facility].        Interval History: Nonverbal, minimal response to painful or verbal stimuli. Follow commands intermittently. On ventilator with trach and with peg.   ROS: As above.    OBJECTIVE  Vitals:  T(C): 36.8 (04-25-18 @ 15:38), Max: 37.1 (04-24-18 @ 22:33)  HR: 98 (04-25-18 @ 15:00) (80 - 99)  BP: 94/73 (04-25-18 @ 15:00) (74/55 - 94/73)  RR: 19 (04-25-18 @ 15:00) (11 - 30)  SpO2: 100% (04-25-18 @ 15:00) (94% - 100%)  Wt(kg): --  Mode: CPAP with PS  FiO2: 40  PEEP: 5  PS: 8  ITime: 1  MAP: 8  PIP: 13    I/O:  I&O's Summary    24 Apr 2018 07:01  -  25 Apr 2018 07:00  --------------------------------------------------------  IN: 1583 mL / OUT: 1700 mL / NET: -117 mL    25 Apr 2018 07:01  -  25 Apr 2018 16:46  --------------------------------------------------------  IN: 581 mL / OUT: 0 mL / NET: 581 mL        PHYSICAL EXAM:  Appearance: NAD no respiratory accessory muscle use. Nonverbal, responsive to painful and verbal stimuli.  HEENT:  PERRL. Unable to assess EOMI 2/2 to compliance. Intermittently opens eyes to voice/command. Conjunctiva clear b/l. Moist oral mucosa.  Cardiovascular: Irregularly irregular with 3/6 systolic murmur heard along upper and lower L sternal border.  Respiratory: Lungs CTAB. Trach in place, clean with no noted secretions or erythema.  Gastrointestinal:  Soft, nontender. Mild distension with noted tympany on percussion.	  Extremities: No noted edema b/l but with venous stasis changes. No erythema b/l. Extremities cool to touch.  Vascular: DP present though diminished.  Neurologic: Nonverbal with some intermittent response to verbal stimuli and intermittent response to painful stimuli. Intermittently responding to commands. Opens eyes intermittently to verbal command, no spontaneous eye opening. Not moving LUE or LLE noted- no withdrawal to pain. Moving RUE/RLE but only intermittently following commands therefore difficulty to assess strength.   	  LABS:                        8.0    12.8  )-----------( 308      ( 25 Apr 2018 06:33 )             27.3     04-25    140  |  93<L>  |  50<H>  ----------------------------<  66<L>  4.2   |  24  |  3.29<H>    Ca    9.1      25 Apr 2018 06:33  Phos  5.4     04-25  Mg     2.0     04-25      PT/INR - ( 25 Apr 2018 06:33 )   PT: 14.5 sec;   INR: 1.30          PTT - ( 25 Apr 2018 06:33 )  PTT:82.7 sec      RADIOLOGY & ADDITIONAL TESTS:  Reviewed .    MEDICATIONS  (STANDING):  aspirin  chewable 81 milliGRAM(s) Oral daily  atorvastatin 80 milliGRAM(s) Oral at bedtime  cefTRIAXone Injectable. 2000 milliGRAM(s) IV Push every 24 hours  chlorhexidine 0.12% Liquid 15 milliLiter(s) Swish and Spit two times a day  chlorhexidine 2% Cloths 1 Application(s) Topical daily  dextrose 10%. 1000 milliLiter(s) (70 mL/Hr) IV Continuous <Continuous>  dextrose 5%. 1000 milliLiter(s) (50 mL/Hr) IV Continuous <Continuous>  dextrose 50% Injectable 12.5 Gram(s) IV Push once  dextrose 50% Injectable 25 Gram(s) IV Push once  dextrose 50% Injectable 25 Gram(s) IV Push once  ergocalciferol Drops 6000 Unit(s) Oral daily  escitalopram 5 milliGRAM(s) Oral daily  fludroCORTISONE 0.1 milliGRAM(s) Oral daily  folic acid 1 milliGRAM(s) Oral daily  insulin glargine Injectable (LANTUS) 21 Unit(s) SubCutaneous at bedtime  insulin regular  human corrective regimen sliding scale   SubCutaneous every 6 hours  insulin regular  human recombinant 7 Unit(s) SubCutaneous every 6 hours  levETIRAcetam  Solution 250 milliGRAM(s) Oral <User Schedule>  levETIRAcetam  Solution 500 milliGRAM(s) Oral every 24 hours  midodrine 15 milliGRAM(s) Oral every 8 hours  modafinil 100 milliGRAM(s) Oral <User Schedule>  multivitamin 1 Tablet(s) Oral daily  pantoprazole   Suspension 40 milliGRAM(s) Oral daily  predniSONE   Tablet 6 milliGRAM(s) Oral every 12 hours  psyllium Powder 1 Packet(s) Oral daily  thiamine 100 milliGRAM(s) Oral daily    MEDICATIONS  (PRN):  acetaminophen    Suspension 650 milliGRAM(s) Oral every 6 hours PRN For Temp greater than 38 C (100.4 F)  acetaminophen    Suspension. 650 milliGRAM(s) Oral every 6 hours PRN Moderate Pain (4 - 6)  dextrose Gel 1 Dose(s) Oral once PRN Blood Glucose LESS THAN 70 milliGRAM(s)/deciliter  glucagon  Injectable 1 milliGRAM(s) IntraMuscular once PRN Glucose LESS THAN 70 milligrams/deciliter

## 2018-04-25 NOTE — PROGRESS NOTE ADULT - PROBLEM SELECTOR PLAN 1
Patient is a 63  year old male with acute on chronic renal failure from ischemic ATN. Patient is currently requiring hemodialysis. Patient was last dialyzed 4/24 with UF of 1.5 kg    P - Patient does not require emergent dialysis today as volume status is acceptable.   Next dialysis 4/26. WIll try to dialyze patient without albumin tomorrow and use a UF profile 1.

## 2018-04-25 NOTE — PROGRESS NOTE ADULT - PROBLEM SELECTOR PLAN 7
Chronic Resp failure- S/p tracheostomy 4/5. Bedside US with simple b/l pleural effusions and atelectatic lung. Less concern for infectious source given b/l effusions with no septations/loculations. Likely related to fluid overload. If clinically worsening, can consider thoracentesis for fluid studies and cultures.  - c/w daily CPAP trials, weaning to trach collar as tolerated  - c/w HD to remove excess fluid

## 2018-04-25 NOTE — PROGRESS NOTE ADULT - PROBLEM SELECTOR PROBLEM 6
Acute on chronic systolic congestive heart failure Type 2 diabetes mellitus with hyperglycemia, with long-term current use of insulin

## 2018-04-25 NOTE — PROGRESS NOTE ADULT - PROBLEM SELECTOR PLAN 4
Acute on chronic renal failure- likely ischemic ATN in setting of sepsis with low intravascular volume. Unknown baseline Cr presenting with Cr 3.93 with metabolic derangements. Renal team on board, now on intermittent HD. HD catheter removed 4/8 due to fungemia, now with Permacath removed 4/19 and replaced with L subclavian HD cath.   - Last HD 4/24, c/w intermittent HD per renal recs  - will need new permacath placement 4/25  -attempt to undergo HD w/o additional albumin for hypotension    #Hyperkalemia  PT with intermittently elevated K, in setting of Acute on chronic renal failure. Has had no significant EKG changes.  - c/w dialysis   - c/w monitoring BMP and kayexalate PRN

## 2018-04-25 NOTE — PROGRESS NOTE ADULT - ASSESSMENT
63M PMH HFrEF 10-15% (ischemic), MI, s/p AICD vs PPM, possible Afib, HTN, DM2 on insulin, possible CKD, and gout, who presented with a chief complaint of generalized weakness s/p septic shock likely 2/2 LE cellulitis complicated by ATN requiring dialysis. Now with AMS likely from brainstem infarct and/or toxic metabolic encephalopathy, now with candidemia (resolving) and sepsis 2/2 klebsiella bacteremia. He was intiially treated with meropenem, then IN: switched to zosyn, then finally to ceftriaxone when sensitivities were available. Hydrocortisone was discontinued due to infection. Patient has continued to have low grade temperatures and intermittently required levophed, hydrocortisone restarted on 4/17.    NEURO  #Toxic Metabolic Encephalopathy- Likely 2/2 acute infarct on imaging; Patient acutely unresponsive since 3/22 and has had waxing/ waning mental status since. CT, CTA negative. VEEG w/ no seizure activity. Decline in mental status likely 2/2 to underlying infection vs acute neurologic event. 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 Per neurology may have had ischemic event from hypoperfusion. Also showing disc protrusion at C3/C4 level coursing superiorly to the C2/C3 contacting the ventral spinal cord. Per neurology, this may be contributing to weakness, however would not explain change in mental status.  S/p PEG/trach on 4/4. Repeat CT head 4/11 performed due to concern for not moving LUE, showing only chronic infarctions. CT C-spine w/o contrast showing multilevel degenerative changes with mod-severe foraminal narrowing @ C3-C4  - C/w Modafinil  - per neuro, no acute interventions   - patient at new baseline mental status  - consider palliative care consult  - goal for placement at [facility] when stabilized  - MRI head repeat for prognostication    #Seizures- possible seizure activity seen on VEEG 4/22. Started on Keppra 1000mg after HD 4/22.  - VEEG can be removed today   - epilepsy following f/u recs  - c/w keppra 500 mg qd with extra 250 mg post HD days    ID  #Klebsiella Bacteremia- Urine, blood and sputum cx growing klebsiella. S/p Zosyn 4/14-4/15. S/p Meropenem 4/13. Sensitive to ceftriaxone.  -C/w Ceftriaxone 2g q 24 hrs (4/15-4/26)  -surveillance blood cultures NGTD   - f/u ID recs  - s/p permacath removal 4/19; cath tip negative growth   - permacath planned for 4/25    #Candidemia with candida tropicalis- Blood cultures positive for candida tropicalis on 4/7 and again on 4/9. No source identified. CTAP 4/11 negative for abscess. RUKHSANA negative for vegetations/ infection of AICD; Tunelled HD catheter removed 4/8, temp HD placed 4/10, permanent HD cath placed 4/12  - c/w Fluconazole 200mg q 24hrs (4/13-4/24)  - Surveillance fungal cultures 4/10 NGTD  - permacath removed 4/19    #UTI- urine culture from 4/11 growing klebsiella sensitive to ceftriaxone, and now bacteremia with GNR likely 2/2 klebsiella (in urine and lung).  - c/w management as per above    CARDIOVASCULAR   # Hypotension  - c/w Midodrine 15 mg q8h to maintain SBP>65  - c/w hydrocortisone 25mg BID-->transition to fludrocortisone 0.1mg qd and prednisone 6mg BID for oral regimen    # CHF with EF 10-15% 2/2 ischemic cardiomyopathy s/p AICD. Elevated BNP on admission with R sided effusion and edema. Patient with persistent pleural effusions on CXR and US and b/l dependent edema.   - Maintain negative fluid balance with dialysis  - c/w holding ACE/BB in setting of sepsis/ hypotension on midodrine    #AFIB- hx of Afib and LV thrombus on coumadin prior to admission; TTE with definity shows no LV thrombus currently   - c/w Heparin gtt  - c/w holding Metoprolol 12.5 q12h given hypotension. Will use Dig for rate control if RVR  - HR 90s-100s; goal <110    #CAD- Hx of MI and ischemic CM s/p AICD, STEMI 7/2017, was started on Aspirin and Brilinta per records from [place]  - c/w aspirin 81 and Atorvastatin 80mg qhs.     #LE Edema- LE edema with chronic venous stasis. Duplex does not show DVT    PULMONARY   #Chronic Resp failure- S/p tracheostomy 4/5. Bedside US with simple b/l pleural effusions and atelectatic lung. Less concern for infectious source given b/l effusions with no septations/loculations. Likely related to fluid overload. If clinically worsening, can consider thoracentesis for fluid studies and cultures.  - c/w daily CPAP trials, weaning to trach collar as tolerated  - c/w HD to remove excess fluid  - HD today 4/24    #R/o Pneumonia- increased secretions overnight and elevated WBC. CXR with no new or worsened consolidations. F/u repeat sputum cxs.    RENAL   #Chronic renal failure- likely ischemic ATN in setting of sepsis with low intravascular volume. Unknown baseline Cr presenting with Cr 3.93 with metabolic derangements. Renal team on board, now on intermittent HD. HD catheter removed 4/8 due to fungemia, now with Permacath removed 4/19 and replaced with L subclavian HD cath.   - Last HD 4/21, c/w intermittent HD per renal recs  - will need new permacath placement tomorrow    #Adrenal Insufficiency- BP improved immediately after hydrocortisone and able to wean off levophed  - c/w hydrocortisone 25 mg BID given marginal response to cortrysyn stimulation test-->transition to fludrocortisone and prednisone for PO regimen    #Elevated AG- Fluctuating at low level; lactate negative, glucose has been well controlled, possibly 2/2 uremia in setting of ESRD.   - Monitor BMP    #Hyperkalemia- PT with intermittently elevated K,  no EKG changes.  - Trend K   - c/w dialysis   - Kayexalate PRN    GI   #PEG in place- C/D/I. c/w tube feeds (holding for IR procedure)  #Diarrhea likely 2/2 tube feeds, C.diff ruled out. Improving. Rectal tube in place--remove today    HEME  #Thrombocytopenia- Resolved. Most likely 2/2 zosyn or sepsis.   #Elevated INR- Improved. Unclear etiology. S/p Vit K and FFPx3. Monitor coags.    #Anemia- Likely 2/2 phlebotomy and CKD, no signs of bleeding. Prior studies showed Anemia of chronic disease.   - monitor CBC, transfuse if Hgb <7    ENDOCRINE   #Diabetes- HbA1C 8.6.  - c/w regular insulin 10 units q6  - c/w lantus 42 unit qhs  - MISS  - monitor FSG and adjust as needed     MSK  #LUE Swelling- noted to have eschar with surrounding edema which is tense, no fluctuance or warmth. F/u LUE US.     F: No IVF   E: Replete K<4 Mg<2, caution in setting of acute renal failure  N: PEG Placed 4/4, Vital 1.5 given diarrhea, NPO MN for permacath tomorrow  DVT ppx: on heparin drip  GI ppx: PPI 40 daily  Lines: L subclavian HD cath placed 4/19  Drips: none  Full code  Dispo: MICU 63M PMH HFrEF 10-15% (ischemic), MI, s/p AICD vs PPM, possible Afib, HTN, DM2 on insulin, possible CKD, and gout, who presented with a chief complaint of generalized weakness s/p septic shock likely 2/2 LE cellulitis complicated by ATN requiring dialysis. Now with AMS likely from brainstem infarct and/or toxic metabolic encephalopathy, now with candidemia (resolving) and sepsis 2/2 klebsiella bacteremia. He was intiially treated with meropenem, then IN: switched to zosyn, then finally to ceftriaxone when sensitivities were available. Hydrocortisone was discontinued due to infection. Patient has continued to have low grade temperatures and intermittently required levophed, hydrocortisone restarted on 4/17.    NEURO  #Toxic Metabolic Encephalopathy- Likely 2/2 acute infarct on imaging; Patient acutely unresponsive since 3/22 and has had waxing/ waning mental status since. CT, CTA negative. VEEG w/ no seizure activity. Decline in mental status likely 2/2 to underlying infection vs acute neurologic event. 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 Per neurology may have had ischemic event from hypoperfusion. Also showing disc protrusion at C3/C4 level coursing superiorly to the C2/C3 contacting the ventral spinal cord. Per neurology, this may be contributing to weakness, however would not explain change in mental status.  S/p PEG/trach on 4/4. Repeat CT head 4/11 performed due to concern for not moving LUE, showing only chronic infarctions. CT C-spine w/o contrast showing multilevel degenerative changes with mod-severe foraminal narrowing @ C3-C4  - C/w Modafinil  - per neuro, no acute interventions   - patient at new baseline mental status  - goal for placement at [facility] when stabilized  - MRI head repeat for prognostication of CVA    #Seizures- possible seizure activity seen on VEEG 4/22. Started on Keppra 1000mg after HD 4/22.  - off VEEG  - c/w keppra 500 mg qd with extra 250 mg post HD days    ID  #Klebsiella Bacteremia- Urine, blood and sputum cx growing klebsiella. S/p Zosyn 4/14-4/15. S/p Meropenem 4/13. Sensitive to ceftriaxone.  -C/w Ceftriaxone 2g q 24 hrs (4/15-4/26)  -surveillance blood cultures NGTD   -f/u ID recs  -s/p permacath removal 4/19; cath tip negative growth   -permacath planned for 4/25    #Candidemia with candida tropicalis- Blood cultures positive for candida tropicalis on 4/7 and again on 4/9. No source identified. CTAP 4/11 negative for abscess. RUKHSANA negative for vegetations/ infection of AICD; Tunelled HD catheter removed 4/8, temp HD placed 4/10, permanent HD cath placed 4/12  - s/p completion of Fluconazole 200mg q 24hrs (4/13-4/24)  - Surveillance fungal cultures 4/10 NGTD  - permacath removed 4/19    #UTI- urine culture from 4/11 growing klebsiella sensitive to ceftriaxone, and now bacteremia with GNR likely 2/2 klebsiella (in urine and lung).  - c/w management as per above    CARDIOVASCULAR   # Hypotension  - c/w Midodrine 15 mg q8h to maintain SBP>65  - c/w fludrocortisone 0.1mg qd and prednisone 6 mg BID for oral regimen    # CHF with EF 10-15% 2/2 ischemic cardiomyopathy s/p AICD. Elevated BNP on admission with R sided effusion and edema. Patient with persistent pleural effusions on CXR and US and b/l dependent edema.   - Maintain negative fluid balance with dialysis  - c/w holding ACE/BB in setting of sepsis/ hypotension on midodrine    #AFIB- hx of Afib and LV thrombus on coumadin prior to admission; TTE with definity shows no LV thrombus currently   - c/w Heparin gtt  - c/w holding Metoprolol 12.5 q12h given hypotension. Will use Dig for rate control if RVR  - HR 90s-100s; goal <110    #CAD- Hx of MI and ischemic CM s/p AICD, STEMI 7/2017, was started on Aspirin and Brilinta per records from [place]  - c/w aspirin 81 and Atorvastatin 80mg qhs.     #LE Edema- LE edema with chronic venous stasis. Duplex does not show DVT    PULMONARY   #Chronic Resp failure- S/p tracheostomy 4/5. Bedside US with simple b/l pleural effusions and atelectatic lung. Less concern for infectious source given b/l effusions with no septations/loculations. Likely related to fluid overload. If clinically worsening, can consider thoracentesis for fluid studies and cultures.  - c/w daily CPAP trials, weaning to trach collar as tolerated  - c/w HD to remove excess fluid  - HD last 4/24    #R/o Pneumonia- increased secretions overnight and elevated WBC. CXR with no new or worsened consolidations. F/u repeat sputum cxs.    RENAL   #Chronic renal failure- likely ischemic ATN in setting of sepsis with low intravascular volume. Unknown baseline Cr presenting with Cr 3.93 with metabolic derangements. Renal team on board, now on intermittent HD. HD catheter removed 4/8 due to fungemia, now with Permacath removed 4/19 and replaced with L subclavian HD cath.   - Last HD 4/24, c/w intermittent HD per renal recs  - will need new permacath placement 4/25  -attempt to undergo HD w/o additional albumin for hypotension    #Adrenal Insufficiency- BP improved immediately after hydrocortisone and able to wean off levophed  - c/w fludrocortisone and prednisone for PO regimen    #Elevated AG- Fluctuating at low level; lactate negative, glucose has been well controlled, possibly 2/2 uremia in setting of ESRD.   - Monitor BMP    #Hyperkalemia- PT with intermittently elevated K,  no EKG changes.  - Trend K   - c/w dialysis   - Kayexalate PRN    GI   #PEG in place- C/D/I. c/w tube feeds (holding for IR procedure)  #Diarrhea likely 2/2 tube feeds, C.diff ruled out. Improving. Rectal tube in place--remove today    HEME  #Thrombocytopenia- Resolved. Most likely 2/2 zosyn or sepsis.   #Elevated INR- Improved. Unclear etiology. S/p Vit K and FFPx3. Monitor coags.    #Anemia- Likely 2/2 phlebotomy and CKD, no signs of bleeding. Prior studies showed Anemia of chronic disease.   - monitor CBC, transfuse if Hgb <7    ENDOCRINE   #Diabetes- HbA1C 8.6.  - c/w regular insulin 7 units q6  - c/w lantus 42 unit qhs --> decreased by half as NPO to 21units  - MISS  - monitor FSG and adjust as needed   -started on D10W @ 70 cc/hr for hypoglycemia to 60s     MSK  #LUE Swelling- noted to have eschar with surrounding edema which is tense, + fluctuance and enlarging.   - LUE US ordered -f/u  - consult hand for possible I&D     F: D10W @ 70cc/hr   E: Replete K<4 Mg<2, caution in setting of acute renal failure  N: PEG Placed 4/4, Vital 1.5 given diarrhea, NPO for permacath  DVT ppx: on heparin drip  GI ppx: PPI 40 daily  Lines: L subclavian HD cath placed 4/19  Drips: none  Full code  Dispo: MICU 63M PMH HFrEF 10-15% (ischemic), MI, s/p AICD vs PPM, possible Afib, HTN, DM2 on insulin, possible CKD, and gout, who presented with a chief complaint of generalized weakness s/p septic shock likely 2/2 LE cellulitis complicated by ATN requiring dialysis. Now with AMS likely from brainstem infarct and/or toxic metabolic encephalopathy, now with candidemia (resolving) and sepsis 2/2 klebsiella bacteremia s/p fluconazole and CTX. Pt reuiring     NEURO  #Toxic Metabolic Encephalopathy- Likely 2/2 acute infarct on imaging; Patient acutely unresponsive since 3/22 and has had waxing/ waning mental status since. CT, CTA negative. VEEG w/ no seizure activity. Decline in mental status likely 2/2 to underlying infection vs acute neurologic event. 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 Per neurology may have had ischemic event from hypoperfusion. Also showing disc protrusion at C3/C4 level coursing superiorly to the C2/C3 contacting the ventral spinal cord. Per neurology, this may be contributing to weakness, however would not explain change in mental status.  S/p PEG/trach on 4/4. Repeat CT head 4/11 performed due to concern for not moving LUE, showing only chronic infarctions. CT C-spine w/o contrast showing multilevel degenerative changes with mod-severe foraminal narrowing @ C3-C4  - C/w Modafinil  - per neuro, no acute interventions   - patient at new baseline mental status  - goal for placement at [facility] when stabilized  - MRI head repeat for prognostication of CVA    #Seizures- possible seizure activity seen on VEEG 4/22. Started on Keppra 1000mg after HD 4/22.  - off VEEG  - c/w keppra 500 mg qd with extra 250 mg post HD days    ID  #Klebsiella Bacteremia- Urine, blood and sputum cx growing klebsiella. S/p Zosyn 4/14-4/15. S/p Meropenem 4/13. Sensitive to ceftriaxone.  -C/w Ceftriaxone 2g q 24 hrs (4/15-4/26)  -surveillance blood cultures NGTD   -f/u ID recs  -s/p permacath removal 4/19; cath tip negative growth   -permacath planned for 4/25    #Candidemia with candida tropicalis- Blood cultures positive for candida tropicalis on 4/7 and again on 4/9. No source identified. CTAP 4/11 negative for abscess. RUKHSANA negative for vegetations/ infection of AICD; Tunelled HD catheter removed 4/8, temp HD placed 4/10, permanent HD cath placed 4/12  - s/p completion of Fluconazole 200mg q 24hrs (4/13-4/24)  - Surveillance fungal cultures 4/10 NGTD  - permacath removed 4/19    #UTI- urine culture from 4/11 growing klebsiella sensitive to ceftriaxone, and now bacteremia with GNR likely 2/2 klebsiella (in urine and lung).  - c/w management as per above    CARDIOVASCULAR   # Hypotension  - c/w Midodrine 15 mg q8h to maintain SBP>65  - c/w fludrocortisone 0.1mg qd and prednisone 6 mg BID for oral regimen    # CHF with EF 10-15% 2/2 ischemic cardiomyopathy s/p AICD. Elevated BNP on admission with R sided effusion and edema. Patient with persistent pleural effusions on CXR and US and b/l dependent edema.   - Maintain negative fluid balance with dialysis  - c/w holding ACE/BB in setting of sepsis/ hypotension on midodrine    #AFIB- hx of Afib and LV thrombus on coumadin prior to admission; TTE with definity shows no LV thrombus currently   - c/w Heparin gtt  - c/w holding Metoprolol 12.5 q12h given hypotension. Will use Dig for rate control if RVR  - HR 90s-100s; goal <110    #CAD- Hx of MI and ischemic CM s/p AICD, STEMI 7/2017, was started on Aspirin and Brilinta per records from [place]  - c/w aspirin 81 and Atorvastatin 80mg qhs.     #LE Edema- LE edema with chronic venous stasis. Duplex does not show DVT    PULMONARY   #Chronic Resp failure- S/p tracheostomy 4/5. Bedside US with simple b/l pleural effusions and atelectatic lung. Less concern for infectious source given b/l effusions with no septations/loculations. Likely related to fluid overload. If clinically worsening, can consider thoracentesis for fluid studies and cultures.  - c/w daily CPAP trials, weaning to trach collar as tolerated  - c/w HD to remove excess fluid  - HD last 4/24    #R/o Pneumonia- increased secretions overnight and elevated WBC. CXR with no new or worsened consolidations. F/u repeat sputum cxs.    RENAL   #Chronic renal failure- likely ischemic ATN in setting of sepsis with low intravascular volume. Unknown baseline Cr presenting with Cr 3.93 with metabolic derangements. Renal team on board, now on intermittent HD. HD catheter removed 4/8 due to fungemia, now with Permacath removed 4/19 and replaced with L subclavian HD cath.   - Last HD 4/24, c/w intermittent HD per renal recs  - will need new permacath placement 4/25  -attempt to undergo HD w/o additional albumin for hypotension    #Adrenal Insufficiency- BP improved immediately after hydrocortisone and able to wean off levophed  - c/w fludrocortisone and prednisone for PO regimen    #Elevated AG- Fluctuating at low level; lactate negative, glucose has been well controlled, possibly 2/2 uremia in setting of ESRD.   - Monitor BMP    #Hyperkalemia- PT with intermittently elevated K,  no EKG changes.  - Trend K   - c/w dialysis   - Kayexalate PRN    GI   #PEG in place- C/D/I. c/w tube feeds (holding for IR procedure)  #Diarrhea likely 2/2 tube feeds, C.diff ruled out. Improving. Rectal tube in place--remove today    HEME  #Thrombocytopenia- Resolved. Most likely 2/2 zosyn or sepsis.   #Elevated INR- Improved. Unclear etiology. S/p Vit K and FFPx3. Monitor coags.    #Anemia- Likely 2/2 phlebotomy and CKD, no signs of bleeding. Prior studies showed Anemia of chronic disease.   - monitor CBC, transfuse if Hgb <7    ENDOCRINE   #Diabetes- HbA1C 8.6.  - c/w regular insulin 7 units q6  - c/w lantus 42 unit qhs --> decreased by half as NPO to 21units  - MISS  - monitor FSG and adjust as needed   -started on D10W @ 70 cc/hr for hypoglycemia to 60s     MSK  #LUE Swelling- noted to have eschar with surrounding edema which is tense, + fluctuance and enlarging.   - LUE US ordered -f/u  - consult hand for possible I&D     F: D10W @ 70cc/hr   E: Replete K<4 Mg<2, caution in setting of acute renal failure  N: PEG Placed 4/4, Vital 1.5 given diarrhea, NPO for permacath  DVT ppx: on heparin drip  GI ppx: PPI 40 daily  Lines: L subclavian HD cath placed 4/19  Drips: none  Full code  Dispo: MICU 63M PMH HFrEF 10-15% (ischemic), MI, s/p AICD vs PPM, possible Afib, HTN, DM2 on insulin, possible CKD, and gout, who presented with a chief complaint of generalized weakness s/p septic shock likely 2/2 LE cellulitis complicated by ATN requiring dialysis. Course also c/b AMS likely from brainstem infarct and/or toxic metabolic encephalopathy and found to have candidemia and sepsis 2/2 klebsiella bacteremia s/p fluconazole and CTX and de-lining. Transitioned to PO meds through PEG for end-goal of [facility] at new baseline mental status. Pt continuing on HD with new permacath placed 2/25 and a/c with hep bridge to coumadin. Currently ventilator dependent tolerating daily CPAP trials. Remaining stable and medically optimized for step down to [facility].    NEURO  #Toxic Metabolic Encephalopathy- Likely 2/2 acute infarct on imaging; Patient acutely unresponsive since 3/22 and has had waxing/ waning mental status since. CT, CTA negative. VEEG w/ no seizure activity. Decline in mental status likely 2/2 to underlying infection vs acute neurologic event. 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 Per neurology may have had ischemic event from hypoperfusion. Also showing disc protrusion at C3/C4 level coursing superiorly to the C2/C3 contacting the ventral spinal cord. Per neurology, this may be contributing to weakness, however would not explain change in mental status.  S/p PEG/trach on 4/4. Repeat CT head 4/11 performed due to concern for not moving LUE, showing only chronic infarctions. CT C-spine w/o contrast showing multilevel degenerative changes with mod-severe foraminal narrowing @ C3-C4  - C/w Modafinil  - per neuro, no acute interventions   - patient at new baseline mental status  - goal for placement at [facility] when stabilized  - MRI head repeat for prognostication of CVA    #Seizures- possible seizure activity seen on VEEG 4/22. Started on Keppra 1000mg after HD 4/22.  - off VEEG  - c/w keppra 500 mg qd with extra 250 mg post HD days    ID  #Klebsiella Bacteremia- Urine, blood and sputum cx growing klebsiella. S/p Zosyn 4/14-4/15. S/p Meropenem 4/13. Sensitive to ceftriaxone.  -C/w Ceftriaxone 2g q 24 hrs (4/15-4/26)  -surveillance blood cultures NGTD   -f/u ID recs  -s/p permacath removal 4/19; cath tip negative growth   -permacath planned for 4/25    #Candidemia with candida tropicalis- Blood cultures positive for candida tropicalis on 4/7 and again on 4/9. No source identified. CTAP 4/11 negative for abscess. RUKHSANA negative for vegetations/ infection of AICD; Tunelled HD catheter removed 4/8, temp HD placed 4/10, permanent HD cath placed 4/12  - s/p completion of Fluconazole 200mg q 24hrs (4/13-4/24)  - Surveillance fungal cultures 4/10 NGTD  - permacath removed 4/19    #UTI- urine culture from 4/11 growing klebsiella sensitive to ceftriaxone, and now bacteremia with GNR likely 2/2 klebsiella (in urine and lung).  - c/w management as per above    CARDIOVASCULAR   # Hypotension  - c/w Midodrine 15 mg q8h to maintain SBP>65  - c/w fludrocortisone 0.1mg qd and prednisone 6 mg BID for oral regimen    # CHF with EF 10-15% 2/2 ischemic cardiomyopathy s/p AICD. Elevated BNP on admission with R sided effusion and edema. Patient with persistent pleural effusions on CXR and US and b/l dependent edema.   - Maintain negative fluid balance with dialysis  - c/w holding ACE/BB in setting of sepsis/ hypotension on midodrine    #AFIB- hx of Afib and LV thrombus on coumadin prior to admission; TTE with definity shows no LV thrombus currently   - c/w Heparin gtt  - c/w holding Metoprolol 12.5 q12h given hypotension. Will use Dig for rate control if RVR  - HR 90s-100s; goal <110    #CAD- Hx of MI and ischemic CM s/p AICD, STEMI 7/2017, was started on Aspirin and Brilinta per records from [place]  - c/w aspirin 81 and Atorvastatin 80mg qhs.     #LE Edema- LE edema with chronic venous stasis. Duplex does not show DVT    PULMONARY   #Chronic Resp failure- S/p tracheostomy 4/5. Bedside US with simple b/l pleural effusions and atelectatic lung. Less concern for infectious source given b/l effusions with no septations/loculations. Likely related to fluid overload. If clinically worsening, can consider thoracentesis for fluid studies and cultures.  - c/w daily CPAP trials, weaning to trach collar as tolerated  - c/w HD to remove excess fluid  - HD last 4/24    RENAL   #Chronic renal failure- likely ischemic ATN in setting of sepsis with low intravascular volume. Unknown baseline Cr presenting with Cr 3.93 with metabolic derangements. Renal team on board, now on intermittent HD. HD catheter removed 4/8 due to fungemia, now with Permacath removed 4/19 and replaced with L subclavian HD cath.   - Last HD 4/24, c/w intermittent HD per renal recs  - will need new permacath placement 4/25  -attempt to undergo HD w/o additional albumin for hypotension    #Adrenal Insufficiency- BP improved immediately after hydrocortisone and able to wean off levophed  - c/w fludrocortisone and prednisone for PO regimen    #Elevated AG- Fluctuating at low level; lactate negative, glucose has been well controlled, possibly 2/2 uremia in setting of ESRD.   - Monitor BMP    #Hyperkalemia- PT with intermittently elevated K,  no EKG changes.  - Trend K   - c/w dialysis   - Kayexalate PRN    GI   #PEG in place- C/D/I. c/w tube feeds (holding for IR procedure)  #Diarrhea likely 2/2 tube feeds, C.diff ruled out. Improving. Rectal tube in place--remove today    HEME  #Thrombocytopenia- Resolved. Most likely 2/2 zosyn or sepsis.   #Elevated INR- Improved. Unclear etiology. S/p Vit K and FFPx3. Monitor coags.    ENDOCRINE   #Diabetes- HbA1C 8.6.  - c/w regular insulin 7 units q6  - c/w lantus 42 unit qhs --> decreased by half as NPO to 21units  - MISS  - monitor FSG and adjust as needed   -started on D10W @ 70 cc/hr for hypoglycemia to 60s     MSK  #LUE Swelling- noted to have eschar with surrounding edema which is tense, + fluctuance and enlarging.   - LUE US ordered -f/u  - consult hand for possible I&D     F: D10W @ 70cc/hr   E: Replete K<4 Mg<2, caution in setting of acute renal failure  N: PEG Placed 4/4, Vital 1.5 given diarrhea, NPO for permacath  DVT ppx: on heparin drip  GI ppx: PPI 40 daily  Lines: L subclavian HD cath placed 4/19  Drips: none  Full code  Dispo: MICU 63M PMH HFrEF 10-15% (ischemic), MI, s/p AICD vs PPM, possible Afib, HTN, DM2 on insulin, possible CKD, and gout, who presented with a chief complaint of generalized weakness s/p septic shock likely 2/2 LE cellulitis complicated by ATN requiring dialysis. Course also c/b AMS likely from brainstem infarct 2/2 LV thrombus and/or toxic metabolic encephalopathy and found to have candidemia and sepsis 2/2 klebsiella bacteremia s/p fluconazole and CTX and de-lining. Transitioned to PO meds through PEG for end-goal of L-TACH at new baseline mental status. Pt continuing on HD with new permacath placed 4/25 and a/c with hep bridge to coumadin for afib and LV thrombus. Currently ventilator dependent tolerating daily CPAP trials. Remaining stable and medically optimized for step down to 7lach.    NEURO  #Toxic Metabolic Encephalopathy- Likely 2/2 acute infarct on imaging; Patient acutely unresponsive since 3/22 and has had waxing/ waning mental status since. CT, CTA negative. VEEG w/ no seizure activity. Decline in mental status likely 2/2 to underlying infection vs acute neurologic event. 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 Per neurology may have had ischemic event from hypoperfusion. Also showing disc protrusion at C3/C4 level coursing superiorly to the C2/C3 contacting the ventral spinal cord. Per neurology, this may be contributing to weakness, however would not explain change in mental status.  S/p PEG/trach on 4/4. Repeat CT head 4/11 performed due to concern for not moving LUE, showing only chronic infarctions. CT C-spine w/o contrast showing multilevel degenerative changes with mod-severe foraminal narrowing @ C3-C4  - C/w Modafinil  - per neuro, no acute interventions   - patient at new baseline mental status  - goal for placement at L-TACH when stabilized  - MRI head repeat for prognostication of CVA    #Seizures- possible seizure activity seen on VEEG 4/22. Started on Keppra 1000mg after HD 4/22.  - off VEEG  - c/w keppra 500 mg qd with extra 250 mg post HD days    ID  #Klebsiella Bacteremia- Urine, blood and sputum cx growing klebsiella. S/p Zosyn 4/14-4/15. S/p Meropenem 4/13. Sensitive to ceftriaxone.  -C/w Ceftriaxone 2g q 24 hrs (4/15-4/26)  -surveillance blood cultures NGTD   -f/u ID recs  -s/p permacath removal 4/19; cath tip negative growth   -permacath planned for 4/25    #Candidemia with candida tropicalis- Blood cultures positive for candida tropicalis on 4/7 and again on 4/9. No source identified. CTAP 4/11 negative for abscess. RUKHSANA negative for vegetations/ infection of AICD; Tunelled HD catheter removed 4/8, temp HD placed 4/10, permanent HD cath placed 4/12  - s/p completion of Fluconazole 200mg q 24hrs (4/13-4/24)  - Surveillance fungal cultures 4/10 NGTD  - permacath removed 4/19    #UTI- urine culture from 4/11 growing klebsiella sensitive to ceftriaxone, and now bacteremia with GNR likely 2/2 klebsiella (in urine and lung).  - c/w management as per above    CARDIOVASCULAR   # Hypotension  - c/w Midodrine 15 mg q8h to maintain SBP>65  - c/w fludrocortisone 0.1mg qd and prednisone 6 mg BID for oral regimen    # CHF with EF 10-15% 2/2 ischemic cardiomyopathy s/p AICD. Elevated BNP on admission with R sided effusion and edema. Patient with persistent pleural effusions on CXR and US and b/l dependent edema.   - Maintain negative fluid balance with dialysis  - c/w holding ACE/BB in setting of sepsis/ hypotension on midodrine    #AFIB- hx of Afib and LV thrombus on coumadin prior to admission; TTE with definity shows no LV thrombus currently   - c/w Heparin gtt  - c/w holding Metoprolol 12.5 q12h given hypotension. Will use Dig for rate control if RVR  - HR 90s-100s; goal <110    #LV thrombus: RUKHSANA on 4/10 showing obvious thrombus in LV  -c/w hep gtt bridging to coumadin beginning 4/25    #CAD- Hx of MI and ischemic CM s/p AICD, STEMI 7/2017, was started on Aspirin and Brilinta per records from [place]  - c/w aspirin 81 and Atorvastatin 80mg qhs.     #LE Edema- LE edema with chronic venous stasis. Duplex does not show DVT    PULMONARY   #Chronic Resp failure- S/p tracheostomy 4/5. Bedside US with simple b/l pleural effusions and atelectatic lung. Less concern for infectious source given b/l effusions with no septations/loculations. Likely related to fluid overload. If clinically worsening, can consider thoracentesis for fluid studies and cultures.  - c/w daily CPAP trials, weaning to trach collar as tolerated  - c/w HD to remove excess fluid  - HD last 4/24    RENAL   #Chronic renal failure- likely ischemic ATN in setting of sepsis with low intravascular volume. Unknown baseline Cr presenting with Cr 3.93 with metabolic derangements. Renal team on board, now on intermittent HD. HD catheter removed 4/8 due to fungemia, now with Permacath removed 4/19 and replaced with L subclavian HD cath.   - Last HD 4/24, c/w intermittent HD per renal recs  - will need new permacath placement 4/25  -attempt to undergo HD w/o additional albumin for hypotension    #Adrenal Insufficiency- BP improved immediately after hydrocortisone and able to wean off levophed  - c/w fludrocortisone and prednisone for PO regimen    #Elevated AG- Fluctuating at low level; lactate negative, glucose has been well controlled, possibly 2/2 uremia in setting of ESRD.   - Monitor BMP    #Hyperkalemia- PT with intermittently elevated K,  no EKG changes.  - Trend K   - c/w dialysis   - Kayexalate PRN    GI   #PEG in place- C/D/I. c/w tube feeds (holding for IR procedure)  #Diarrhea likely 2/2 tube feeds, C.diff ruled out. Improving. Rectal tube in place--remove today    HEME  #Thrombocytopenia- Resolved. Most likely 2/2 zosyn or sepsis.   #Elevated INR- Improved. Unclear etiology. S/p Vit K and FFPx3. Monitor coags.    ENDOCRINE   #Diabetes- HbA1C 8.6.  - c/w regular insulin 7 units q6  - c/w lantus 42 unit qhs --> decreased by half as NPO to 21units  - MISS  - monitor FSG and adjust as needed   -started on D10W @ 70 cc/hr for hypoglycemia to 60s     MSK  #LUE Swelling- noted to have eschar with surrounding edema which is tense, + fluctuance and enlarging.   - LUE US ordered -f/u  - consult hand for possible I&D     F: D10W @ 70cc/hr   E: Replete K<4 Mg<2, caution in setting of acute renal failure  N: PEG Placed 4/4, Vital 1.5 given diarrhea, NPO for permacath  DVT ppx: on heparin drip  GI ppx: PPI 40 daily  Lines: L subclavian HD cath placed 4/19  Drips: none  Full code  Dispo: MICU

## 2018-04-25 NOTE — PROGRESS NOTE ADULT - PROBLEM SELECTOR PLAN 6
#Diabetes-   HbA1C 8.6. Has been on Lantus 42U qHS, insulin 7 units q6h. Insulin was decreased to 21U for being NPO but noted to have complication of hypoglycemia.   -c/w MISS, Lantus ***qHS and restart regular insulin 7U q6h.    #Hypoglycemia  Noted to have complication of hypoglycemia with FS in 60s. Started on D10 while NPO for permacath.  -Cautious with ***

## 2018-04-25 NOTE — PROGRESS NOTE ADULT - PROBLEM SELECTOR PLAN 9
Hx of Afib and LV thrombus on coumadin prior to admission. TTE with Definity shows no LV thrombus currently   - c/w holding Metoprolol 12.5 q12h given hypotension. Will use Dig for rate control if RVR  - HR 90s-100s; goal <110    #LV thrombus Hx of Afib and LV thrombus on coumadin prior to admission. TTE with Definity shows no LV thrombus currently   - c/w holding Metoprolol 12.5 q12h given hypotension and HR as been controlled in 70-90s. Will use Dig for rate control if RVR- goal <110  -c/w hep gtt and plan for bridge to coumadin- dosed for coumadin tonight.   -Repeat PTT tonight as held for IR procedure.     #LV thrombus  LV thrombus noted on RUKHSANA on 4/10 for which patient has been on hep gtt. Plan to bridge to coumadin tonight.   -dosed for coumadin. No complaints

## 2018-04-25 NOTE — PROGRESS NOTE ADULT - PROBLEM SELECTOR PLAN 2
During course, noted to have Klebsiella growing in Urine, blood and sputum. Permacath removed on 4/19 for concern of potential source but cath tip negative growth.  Initially started on Meropenem (1 day), switched to Zosyn (2 days) and narrowed to Ceftriaxone based on sensitivities.   -C/w Ceftriaxone 2g q 24 hrs (4/15-4/26)  -surveillance blood cultures NGTD   -Permacath placed today    #Candidemia with candida tropicalis  Blood cultures positive for candida tropicalis on 4/7 and again on 4/9. No source identified. CTAP 4/11 negative for abscess. RUKHSANA negative for vegetations/ infection of AICD; Tunelled HD catheter removed 4/8, temp HD placed 4/10, permanent HD cath placed 4/12. Permacath removed 4/19 Completed Fluconazole 200mg q 24hrs (4/13-4/24)  - Surveillance fungal cultures 4/10 NGTD    #UTI- urine culture from 4/11 growing klebsiella sensitive to ceftriaxone, and now bacteremia with GNR likely 2/2 klebsiella (in urine and lung).  - c/w ceftriaxone    #Elevated AG- Fluctuating at low level; lactate negative, glucose has been well controlled, possibly 2/2 uremia in setting of ESRD.   - Monitor BMP During course, noted to have Klebsiella growing in Urine, blood and sputum. Permacath removed on 4/19 for concern of potential source but cath tip negative growth.  Initially started on Meropenem (1 day), switched to Zosyn (2 days) and narrowed to Ceftriaxone based on sensitivities.   -C/w Ceftriaxone 2g q 24 hrs (4/15-4/26- last day tomorrow).  -surveillance blood cultures remain no growth.  -Permacath placed today    #Eschar  Noted eschar on LUE with some soft tissue swelling surrounding. Ortho hand consulted and pending ultrasound for soft tissue of LUE.     #Candidemia with candida tropicalis  Blood cultures positive for candida tropicalis on 4/7 and again on 4/9. No source identified. CTAP 4/11 negative for abscess. RUKHSANA negative for vegetations/ infection of AICD; Tunelled HD catheter removed 4/8, temp HD placed 4/10, permanent HD cath placed 4/12. Permacath removed 4/19 Completed Fluconazole 200mg q 24hrs (4/13-4/24).  - Surveillance fungal cultures 4/10 NGTD    #UTI- urine culture from 4/11 growing klebsiella sensitive to ceftriaxone, and now bacteremia with GNR likely 2/2 klebsiella (in urine and lung).  - c/w ceftriaxone    #Elevated AG- at 23 this AM-slow uptrend from 17-18 previously. Neg lactate, glucose has been well controlled, possibly 2/2 uremia in setting of ESRD.   - Monitor BMP During course, noted to have Klebsiella growing in Urine, blood and sputum. Permacath removed on 4/19 for concern of potential source but cath tip negative growth.  Initially started on Meropenem (1 day), switched to Zosyn (2 days) and narrowed to Ceftriaxone based on sensitivities.   -C/w Ceftriaxone 2g q24h (4/15-4/26- last day tomorrow).  -surveillance blood cultures remain no growth.  -Permacath placed today    #Eschar  Noted eschar on LUE with some soft tissue swelling surrounding. Ortho hand consulted and pending ultrasound for soft tissue of LUE.     #Candidemia with candida tropicalis  Blood cultures positive for candida tropicalis on 4/7 and again on 4/9. No source identified. CTAP 4/11 negative for abscess. RUKHSANA negative for vegetations/ infection of AICD; Tunelled HD catheter removed 4/8, temp HD placed 4/10, permanent HD cath placed 4/12. Permacath removed 4/19 Completed Fluconazole 200mg q 24hrs (4/13-4/24).  - Surveillance fungal cultures 4/10 NGTD    #UTI- urine culture from 4/11 growing klebsiella sensitive to ceftriaxone, and now bacteremia with GNR likely 2/2 klebsiella (in urine and lung).  - c/w ceftriaxone    #Elevated AG- at 23 this AM-slow uptrend from 17-18 previously. Neg lactate, glucose has been well controlled, possibly 2/2 uremia in setting of ESRD.   - Monitor BMP

## 2018-04-25 NOTE — PROGRESS NOTE ADULT - PROBLEM SELECTOR PLAN 1
Course has been complicated by septic shock,   - c/w Midodrine 15 mg q8h to maintain SBP>65  - c/w fludrocortisone 0.1mg qd and prednisone 6 mg BID for oral regimen    #Adrenal Insufficiency  BP improved immediately after hydrocortisone and able to wean off levophed  - c/w fludrocortisone and prednisone for PO regimen Course has been complicated by septic shock as well as component of cardiogenic shock. Patient required pressors and inotropes.   - c/w Midodrine 15 mg q8h to maintain SBP>65  - c/w fludrocortisone 0.1mg qd and prednisone 6 mg BID for oral regimen    #Adrenal Insufficiency  BP improved immediately after hydrocortisone and able to wean off levophed  - c/w fludrocortisone and prednisone for PO regimen Course has been complicated by septic shock as well as component of cardiogenic shock. Patient required pressors and inotropes for which he has been weaned off. On midodrine 15 mg TID. Started   - c/w Midodrine 15 mg q8h to maintain SBP>65  - c/w fludrocortisone 0.1mg qd and prednisone 6 mg BID for oral regimen    #Adrenal Insufficiency  BP improved immediately after hydrocortisone and able to wean off levophed  - c/w fludrocortisone and prednisone for PO regimen

## 2018-04-25 NOTE — PROGRESS NOTE ADULT - PROBLEM SELECTOR PLAN 8
CHF with EF 10-15% 2/2 ischemic cardiomyopathy s/p AICD. Elevated BNP on admission with R sided effusion and edema. Patient with persistent pleural effusions on CXR and US and b/l dependent edema.   - Maintain negative fluid balance with dialysis  - c/w holding ACE/BB in setting of sepsis/ hypotension on midodrine    #CAD- Hx of MI and ischemic CM s/p AICD, STEMI 7/2017, was started on Aspirin and Brilinta.   - c/w aspirin 81 and Atorvastatin 80mg qhs.

## 2018-04-25 NOTE — PROGRESS NOTE ADULT - ASSESSMENT
63M PMH HFrEF 10-15% (ischemic), MI, s/p AICD vs PPM, possible Afib, HTN, DM2 on insulin, possible CKD, and gout, who presented with a chief complaint of generalized weakness and admitted to MICU for septic shock likely 2/2 LE cellulitis  as well as ATN requiring hemodialysis. Course  complicated by altered mental status likely secondary to acute brainstem infarct. Course also complicated by candidemia (resolving) and sepsis 2/2 klebsiella bacteremia. He was initially treated with meropenem,  switched to zosyn, then finally to ceftriaxone when sensitivities were available. Hydrocortisone was discontinued due to infection. Patient continued to spike low grade fevers, complications of hypotension requiring levophed and has been transitioned to midodrine 15mg TID and albumin during HD. Now off pressors patient to be further managed on 7LACH.

## 2018-04-25 NOTE — PROGRESS NOTE ADULT - SUBJECTIVE AND OBJECTIVE BOX
INTERVAL HPI/OVERNIGHT EVENTS:  and 165 overnight     SUBJECTIVE: Patient seen and examined at bedside.     CONSTITUTIONAL: No weakness, fevers or chills  EYES/ENT: No visual changes;  No vertigo or throat pain   NECK: No pain or stiffness  RESPIRATORY: No cough, wheezing, hemoptysis; No shortness of breath  CARDIOVASCULAR: No chest pain or palpitations  GASTROINTESTINAL: No abdominal or epigastric pain. No nausea, vomiting, or hematemesis; No diarrhea or constipation. No melena or hematochezia.  GENITOURINARY: No dysuria, frequency or hematuria  NEUROLOGICAL: No numbness or weakness  SKIN: No itching, rashes    OBJECTIVE:    VITAL SIGNS:  ICU Vital Signs Last 24 Hrs  T(C): 36.4 (25 Apr 2018 00:02), Max: 37.1 (24 Apr 2018 22:33)  T(F): 97.6 (25 Apr 2018 00:02), Max: 98.8 (24 Apr 2018 22:33)  HR: 89 (25 Apr 2018 06:16) (80 - 100)  BP: 82/63 (25 Apr 2018 05:00) (74/55 - 98/72)  BP(mean): 68 (25 Apr 2018 05:00) (61 - 82)  ABP: --  ABP(mean): --  RR: 17 (25 Apr 2018 06:16) (3 - 30)  SpO2: 100% (25 Apr 2018 06:16) (94% - 100%)    Mode: CPAP with PS, FiO2: 40, PEEP: 5, PS: 10, ITime: 1, MAP: 8.7, PIP: 18    04-23 @ 07:01 - 04-24 @ 07:00  --------------------------------------------------------  IN: 1130 mL / OUT: 100 mL / NET: 1030 mL    04-24 @ 07:01 - 04-25 @ 06:26  --------------------------------------------------------  IN: 1422 mL / OUT: 1700 mL / NET: -278 mL      CAPILLARY BLOOD GLUCOSE      POCT Blood Glucose.: 165 mg/dL (24 Apr 2018 23:41)      PHYSICAL EXAM:    General: NAD  HEENT: NC/AT; PERRL, clear conjunctiva  Neck: supple  Respiratory: CTA b/l  Cardiovascular: +S1/S2; RRR  Abdomen: soft, NT/ND; +BS x4  Extremities: WWP, 2+ peripheral pulses b/l; no LE edema  Skin: normal color and turgor; no rash  Neurological:    MEDICATIONS:  MEDICATIONS  (STANDING):  aspirin  chewable 81 milliGRAM(s) Oral daily  atorvastatin 80 milliGRAM(s) Oral at bedtime  cefTRIAXone Injectable. 2000 milliGRAM(s) IV Push every 24 hours  chlorhexidine 0.12% Liquid 15 milliLiter(s) Swish and Spit two times a day  chlorhexidine 2% Cloths 1 Application(s) Topical daily  dextrose 5%. 1000 milliLiter(s) (50 mL/Hr) IV Continuous <Continuous>  dextrose 50% Injectable 12.5 Gram(s) IV Push once  dextrose 50% Injectable 25 Gram(s) IV Push once  dextrose 50% Injectable 25 Gram(s) IV Push once  ergocalciferol Drops 6000 Unit(s) Oral daily  escitalopram 5 milliGRAM(s) Oral daily  fludroCORTISONE 0.1 milliGRAM(s) Oral daily  folic acid 1 milliGRAM(s) Oral daily  heparin  Infusion 1600 Unit(s)/Hr (17 mL/Hr) IV Continuous <Continuous>  insulin glargine Injectable (LANTUS) 21 Unit(s) SubCutaneous at bedtime  insulin regular  human corrective regimen sliding scale   SubCutaneous every 6 hours  insulin regular  human recombinant 7 Unit(s) SubCutaneous every 6 hours  levETIRAcetam  Solution 250 milliGRAM(s) Oral <User Schedule>  levETIRAcetam  Solution 500 milliGRAM(s) Oral every 24 hours  midodrine 15 milliGRAM(s) Oral every 8 hours  modafinil 100 milliGRAM(s) Oral <User Schedule>  multivitamin 1 Tablet(s) Oral daily  pantoprazole   Suspension 40 milliGRAM(s) Oral daily  predniSONE   Tablet 6 milliGRAM(s) Oral every 12 hours  psyllium Powder 1 Packet(s) Oral daily  thiamine 100 milliGRAM(s) Oral daily    MEDICATIONS  (PRN):  acetaminophen    Suspension 650 milliGRAM(s) Oral every 6 hours PRN For Temp greater than 38 C (100.4 F)  acetaminophen    Suspension. 650 milliGRAM(s) Oral every 6 hours PRN Moderate Pain (4 - 6)  dextrose Gel 1 Dose(s) Oral once PRN Blood Glucose LESS THAN 70 milliGRAM(s)/deciliter  glucagon  Injectable 1 milliGRAM(s) IntraMuscular once PRN Glucose LESS THAN 70 milligrams/deciliter      ALLERGIES:  Allergies    No Known Allergies    Intolerances        LABS:                        7.3    16.8  )-----------( 370      ( 24 Apr 2018 05:32 )             24.3     04-24    144  |  99  |  67<H>  ----------------------------<  113<H>  4.6   |  23  |  4.15<H>    Ca    9.0      24 Apr 2018 05:32  Phos  6.3     04-24  Mg     2.2     04-24      PTT - ( 24 Apr 2018 06:52 )  PTT:62.3 sec      RADIOLOGY & ADDITIONAL TESTS: Reviewed. INTERVAL HPI/OVERNIGHT EVENTS:  and 165 overnight, AM FSG 65 s/p 1 Amp D50     SUBJECTIVE: Patient seen and examined at bedside. NAD. No respiratory distress     CONSTITUTIONAL: No weakness, fevers or chills  EYES/ENT: No visual changes;  No vertigo or throat pain   NECK: No pain or stiffness  RESPIRATORY: No cough, wheezing, hemoptysis; No shortness of breath  CARDIOVASCULAR: No chest pain or palpitations  GASTROINTESTINAL: No abdominal or epigastric pain. No nausea, vomiting, or hematemesis; No diarrhea or constipation. No melena or hematochezia.  GENITOURINARY: No dysuria, frequency or hematuria  NEUROLOGICAL: No numbness or weakness  SKIN: No itching, rashes    OBJECTIVE:    VITAL SIGNS:  ICU Vital Signs Last 24 Hrs  T(C): 36.4 (25 Apr 2018 00:02), Max: 37.1 (24 Apr 2018 22:33)  T(F): 97.6 (25 Apr 2018 00:02), Max: 98.8 (24 Apr 2018 22:33)  HR: 89 (25 Apr 2018 06:16) (80 - 100)  BP: 82/63 (25 Apr 2018 05:00) (74/55 - 98/72)  BP(mean): 68 (25 Apr 2018 05:00) (61 - 82)  ABP: --  ABP(mean): --  RR: 17 (25 Apr 2018 06:16) (3 - 30)  SpO2: 100% (25 Apr 2018 06:16) (94% - 100%)    Mode: CPAP with PS, FiO2: 40, PEEP: 5, PS: 10, ITime: 1, MAP: 8.7, PIP: 18    04-23 @ 07:01 - 04-24 @ 07:00  --------------------------------------------------------  IN: 1130 mL / OUT: 100 mL / NET: 1030 mL    04-24 @ 07:01 - 04-25 @ 06:26  --------------------------------------------------------  IN: 1422 mL / OUT: 1700 mL / NET: -278 mL      CAPILLARY BLOOD GLUCOSE      POCT Blood Glucose.: 165 mg/dL (24 Apr 2018 23:41)      PHYSICAL EXAM:    General: NAD  HEENT: NC/AT; PERRL, clear conjunctiva  Neck: supple  Respiratory: CTA b/l  Cardiovascular: +S1/S2; RRR  Abdomen: soft, NT/ND; +BS x4  Extremities: WWP, 2+ peripheral pulses b/l; no LE edema  Skin: normal color and turgor; no rash  Neurological:    MEDICATIONS:  MEDICATIONS  (STANDING):  aspirin  chewable 81 milliGRAM(s) Oral daily  atorvastatin 80 milliGRAM(s) Oral at bedtime  cefTRIAXone Injectable. 2000 milliGRAM(s) IV Push every 24 hours  chlorhexidine 0.12% Liquid 15 milliLiter(s) Swish and Spit two times a day  chlorhexidine 2% Cloths 1 Application(s) Topical daily  dextrose 5%. 1000 milliLiter(s) (50 mL/Hr) IV Continuous <Continuous>  dextrose 50% Injectable 12.5 Gram(s) IV Push once  dextrose 50% Injectable 25 Gram(s) IV Push once  dextrose 50% Injectable 25 Gram(s) IV Push once  ergocalciferol Drops 6000 Unit(s) Oral daily  escitalopram 5 milliGRAM(s) Oral daily  fludroCORTISONE 0.1 milliGRAM(s) Oral daily  folic acid 1 milliGRAM(s) Oral daily  heparin  Infusion 1600 Unit(s)/Hr (17 mL/Hr) IV Continuous <Continuous>  insulin glargine Injectable (LANTUS) 21 Unit(s) SubCutaneous at bedtime  insulin regular  human corrective regimen sliding scale   SubCutaneous every 6 hours  insulin regular  human recombinant 7 Unit(s) SubCutaneous every 6 hours  levETIRAcetam  Solution 250 milliGRAM(s) Oral <User Schedule>  levETIRAcetam  Solution 500 milliGRAM(s) Oral every 24 hours  midodrine 15 milliGRAM(s) Oral every 8 hours  modafinil 100 milliGRAM(s) Oral <User Schedule>  multivitamin 1 Tablet(s) Oral daily  pantoprazole   Suspension 40 milliGRAM(s) Oral daily  predniSONE   Tablet 6 milliGRAM(s) Oral every 12 hours  psyllium Powder 1 Packet(s) Oral daily  thiamine 100 milliGRAM(s) Oral daily    MEDICATIONS  (PRN):  acetaminophen    Suspension 650 milliGRAM(s) Oral every 6 hours PRN For Temp greater than 38 C (100.4 F)  acetaminophen    Suspension. 650 milliGRAM(s) Oral every 6 hours PRN Moderate Pain (4 - 6)  dextrose Gel 1 Dose(s) Oral once PRN Blood Glucose LESS THAN 70 milliGRAM(s)/deciliter  glucagon  Injectable 1 milliGRAM(s) IntraMuscular once PRN Glucose LESS THAN 70 milligrams/deciliter      ALLERGIES:  Allergies    No Known Allergies    Intolerances        LABS:                        7.3    16.8  )-----------( 370      ( 24 Apr 2018 05:32 )             24.3     04-24    144  |  99  |  67<H>  ----------------------------<  113<H>  4.6   |  23  |  4.15<H>    Ca    9.0      24 Apr 2018 05:32  Phos  6.3     04-24  Mg     2.2     04-24      PTT - ( 24 Apr 2018 06:52 )  PTT:62.3 sec      RADIOLOGY & ADDITIONAL TESTS: Reviewed. INTERVAL HPI/OVERNIGHT EVENTS:  and 165 overnight, AM FSG 65 s/p 1 Amp D50     SUBJECTIVE: Patient seen and examined at bedside. NAD. No respiratory distress. Laying comfortably in bed. Awake and alert. Responsive to verbal and tactile stimuli. Tracks movements with eyes. Unable to assess ROS. Appears comfortable.        OBJECTIVE:    VITAL SIGNS:  ICU Vital Signs Last 24 Hrs  T(C): 36.4 (25 Apr 2018 00:02), Max: 37.1 (24 Apr 2018 22:33)  T(F): 97.6 (25 Apr 2018 00:02), Max: 98.8 (24 Apr 2018 22:33)  HR: 89 (25 Apr 2018 06:16) (80 - 100)  BP: 82/63 (25 Apr 2018 05:00) (74/55 - 98/72)  BP(mean): 68 (25 Apr 2018 05:00) (61 - 82)  ABP: --  ABP(mean): --  RR: 17 (25 Apr 2018 06:16) (3 - 30)  SpO2: 100% (25 Apr 2018 06:16) (94% - 100%)    Mode: CPAP with PS, FiO2: 40, PEEP: 5, PS: 10, ITime: 1, MAP: 8.7, PIP: 18    04-23 @ 07:01  -  04-24 @ 07:00  --------------------------------------------------------  IN: 1130 mL / OUT: 100 mL / NET: 1030 mL    04-24 @ 07:01  -  04-25 @ 06:26  --------------------------------------------------------  IN: 1422 mL / OUT: 1700 mL / NET: -278 mL      CAPILLARY BLOOD GLUCOSE      POCT Blood Glucose.: 165 mg/dL (24 Apr 2018 23:41)      PHYSICAL EXAM:    General: NAD, cachectic, chronically ill-appearing, no respiratory distress, more alert and awake   HEENT: NC/AT, PERRL, MM dry  Neck: supple, trach in place, L subclavian HD cath in place  Respiratory: coarse breath sounds b/l, otherwise CTA b/l, no W/R/R; no retractions or labored breathing  Cardiovascular: +S1/S2, RRR, no MRG  Abdomen: soft, mildly distended, nontender, +BS, PEG in place C/D/I  Extremities: WWP, 1+ pitting edema to level of thigh b/l, LUE with eschar on medial surface of forearm with surrounding tense edema, +fluctuance  Vasc: 2+ peripheral pulses b/l  Skin: dry, b/l LE with peeling skin  Neurological: nonverbal, following commands to open and close eyes, RUE flexed and rigid, LUE rigid, no spontaneous movement of b/l LE, alert and awake, tremor of RUE and facial muscles; able to move toes on command      MEDICATIONS:  MEDICATIONS  (STANDING):  aspirin  chewable 81 milliGRAM(s) Oral daily  atorvastatin 80 milliGRAM(s) Oral at bedtime  cefTRIAXone Injectable. 2000 milliGRAM(s) IV Push every 24 hours  chlorhexidine 0.12% Liquid 15 milliLiter(s) Swish and Spit two times a day  chlorhexidine 2% Cloths 1 Application(s) Topical daily  dextrose 5%. 1000 milliLiter(s) (50 mL/Hr) IV Continuous <Continuous>  dextrose 50% Injectable 12.5 Gram(s) IV Push once  dextrose 50% Injectable 25 Gram(s) IV Push once  dextrose 50% Injectable 25 Gram(s) IV Push once  ergocalciferol Drops 6000 Unit(s) Oral daily  escitalopram 5 milliGRAM(s) Oral daily  fludroCORTISONE 0.1 milliGRAM(s) Oral daily  folic acid 1 milliGRAM(s) Oral daily  heparin  Infusion 1600 Unit(s)/Hr (17 mL/Hr) IV Continuous <Continuous>  insulin glargine Injectable (LANTUS) 21 Unit(s) SubCutaneous at bedtime  insulin regular  human corrective regimen sliding scale   SubCutaneous every 6 hours  insulin regular  human recombinant 7 Unit(s) SubCutaneous every 6 hours  levETIRAcetam  Solution 250 milliGRAM(s) Oral <User Schedule>  levETIRAcetam  Solution 500 milliGRAM(s) Oral every 24 hours  midodrine 15 milliGRAM(s) Oral every 8 hours  modafinil 100 milliGRAM(s) Oral <User Schedule>  multivitamin 1 Tablet(s) Oral daily  pantoprazole   Suspension 40 milliGRAM(s) Oral daily  predniSONE   Tablet 6 milliGRAM(s) Oral every 12 hours  psyllium Powder 1 Packet(s) Oral daily  thiamine 100 milliGRAM(s) Oral daily    MEDICATIONS  (PRN):  acetaminophen    Suspension 650 milliGRAM(s) Oral every 6 hours PRN For Temp greater than 38 C (100.4 F)  acetaminophen    Suspension. 650 milliGRAM(s) Oral every 6 hours PRN Moderate Pain (4 - 6)  dextrose Gel 1 Dose(s) Oral once PRN Blood Glucose LESS THAN 70 milliGRAM(s)/deciliter  glucagon  Injectable 1 milliGRAM(s) IntraMuscular once PRN Glucose LESS THAN 70 milligrams/deciliter      ALLERGIES:  Allergies    No Known Allergies    Intolerances        LABS:                        7.3    16.8  )-----------( 370      ( 24 Apr 2018 05:32 )             24.3     04-24    144  |  99  |  67<H>  ----------------------------<  113<H>  4.6   |  23  |  4.15<H>    Ca    9.0      24 Apr 2018 05:32  Phos  6.3     04-24  Mg     2.2     04-24      PTT - ( 24 Apr 2018 06:52 )  PTT:62.3 sec      RADIOLOGY & ADDITIONAL TESTS: Reviewed. ICU STEPDOWN TO 7LA  PGY-1 TRANSFER NOTE    HOSPITAL COURSE:  63M PMH HFrEF 10-15% (ischemic), MI (STEMI per [facility] records 7/17), s/p AICD, Afib, HTN, DM2 on insulin, CKD, and gout admitted for septic shock 2/2 LE cellulitis. Pt was started on levophed for hypotension and to help renal perfusion. Pt was also started on dobutamine given low mix venous saturation however had to be discontinued given tachycardia. Pt also experienced tachycardia on Milrinone. Pt was started on Vanco/ Zosyn and completed total 11 days of abx. PT was started on solucortef 50 q6h given recent hx of steroid use. Gallium scan showed only LLE  cellulitis. TTE with no vegetations. Given worsening GILMER and low UO, renal was consulted. HD cath was placed and pt was started on CVVD and eventually transitioned to intermittent HD. Pt was noted have b/l pulmonary effusion and required R chest tube placement with fluid studies showing exudative process. Given extensive cardiac hx and component of cardiogenic shock, cardiology was consulted. Pt was started on Vasopressin to improve blood pressure. Pt also stated on Isordil and Hydralazine but pt was not  able to tolerate the medications. Pt was started  on Hep gtt for Afib but was tthen placed on Argatroban given possibility of HIT. HIT was ruled out and patient was bridged to coumadin. Pt was stable for transfer to CCU for further management of cardiogenic shock however on 3/22, pt became unresponsive post A line placement. Vital signs were normal during event. Stroke code was called and he was intubated for airway protection. CT was negative MRI done 4 days later showed chronic infarcts with possible small brainstem stroke per neurology read. He was started on Modafinil however mental status did not improve significantly. VEEG showed slowing but no seizures. After GOC discussions with family patient had trach and PEG on 4/4 with plans to be discharged to [facility] when stable. On 4/8 patient was found to be fungemic with candida tropicalis and started on micafungin, then switched to fluconazole on 4/13 when sensitivities resulted. RUKHSANA was negative for vegetations or infection of AICD. CTAP also performed given transamnitis/ elevated bili but source of candidemia was not found. HD Permacath was removed 4/8 and was replaced on 4/12 when surveillance cultures had been negative for 48 hours. However overnight on 4/12 patient spiked fever again and blood, urine and sputum cultures returned positive for Klebsiella. He was started on meropenem for 1 day, then switched to Zosyn for 2 days and finally placed on ceftriaxone when sensitivities returned on 4/15. Patient restarted on hydrocortisone in attempt to wean off pressors. Thrombocytopenia resolved and patient restarted on heparin gtt for A-fib. Given continued low-grade fevers and uptrending white count, permacath removed on 4/19 and replaced with temp HD cath. The following weekend, noticed pt with seizure-like activity and VEEG placed. Per neuro, started on keppra 500 mg qd with additional 250cc keppra on post-HD days. End goal of L-tachy placement.  Transitioned to PO prednisone and fludrocortisone to replace hydrocortisone. L-tachy will not accept albumin pushes, and goal to wean patient off albumin during HD for hypotension. In addition, pt to have repeat MRI for prognostication of CVA. Permacath placed 4/25. Pt also noticed to have L forearm eschar that has enlarged and increasing fluctuance. LUE US ordered also showing R superficial thrombus; reordered saoft tissue US to eval for abscess in L forearm. Ortho for hand consulted for possible I&D. Rectal tube in place to be removed today. Pt to resume a/c with hep bridging to coumadin beginning tonight 4/25. Of note, pt is very sensitive to insulin. Has been getting nutritional insulin with basal insulin, however can become hypogylcemic requiring D10W and D50 amps with improvement in FSG.        INTERVAL HPI/OVERNIGHT EVENTS:  and 165 overnight, AM FSG 65 s/p 1 Amp D50     SUBJECTIVE: Patient seen and examined at bedside. NAD. No respiratory distress. Laying comfortably in bed. Awake and alert. Responsive to verbal and tactile stimuli. Tracks movements with eyes. Unable to assess ROS. Appears comfortable.        OBJECTIVE:    VITAL SIGNS:  ICU Vital Signs Last 24 Hrs  T(C): 36.4 (25 Apr 2018 00:02), Max: 37.1 (24 Apr 2018 22:33)  T(F): 97.6 (25 Apr 2018 00:02), Max: 98.8 (24 Apr 2018 22:33)  HR: 89 (25 Apr 2018 06:16) (80 - 100)  BP: 82/63 (25 Apr 2018 05:00) (74/55 - 98/72)  BP(mean): 68 (25 Apr 2018 05:00) (61 - 82)  ABP: --  ABP(mean): --  RR: 17 (25 Apr 2018 06:16) (3 - 30)  SpO2: 100% (25 Apr 2018 06:16) (94% - 100%)    Mode: CPAP with PS, FiO2: 40, PEEP: 5, PS: 10, ITime: 1, MAP: 8.7, PIP: 18    04-23 @ 07:01  -  04-24 @ 07:00  --------------------------------------------------------  IN: 1130 mL / OUT: 100 mL / NET: 1030 mL    04-24 @ 07:01  -  04-25 @ 06:26  --------------------------------------------------------  IN: 1422 mL / OUT: 1700 mL / NET: -278 mL      CAPILLARY BLOOD GLUCOSE      POCT Blood Glucose.: 165 mg/dL (24 Apr 2018 23:41)      PHYSICAL EXAM:    General: NAD, cachectic, chronically ill-appearing, no respiratory distress, more alert and awake   HEENT: NC/AT, PERRL, MM dry  Neck: supple, trach in place, L subclavian HD cath in place  Respiratory: coarse breath sounds b/l, otherwise CTA b/l, no W/R/R; no retractions or labored breathing  Cardiovascular: +S1/S2, RRR, no MRG  Abdomen: soft, mildly distended, nontender, +BS, PEG in place C/D/I  Extremities: WWP, 1+ pitting edema to level of thigh b/l, LUE with eschar on medial surface of forearm with surrounding tense edema, +fluctuance  Vasc: 2+ peripheral pulses b/l  Skin: dry, b/l LE with peeling skin  Neurological: nonverbal, following commands to open and close eyes, RUE flexed and rigid, LUE rigid, no spontaneous movement of b/l LE, alert and awake, tremor of RUE and facial muscles; able to move toes on command      MEDICATIONS:  MEDICATIONS  (STANDING):  aspirin  chewable 81 milliGRAM(s) Oral daily  atorvastatin 80 milliGRAM(s) Oral at bedtime  cefTRIAXone Injectable. 2000 milliGRAM(s) IV Push every 24 hours  chlorhexidine 0.12% Liquid 15 milliLiter(s) Swish and Spit two times a day  chlorhexidine 2% Cloths 1 Application(s) Topical daily  dextrose 5%. 1000 milliLiter(s) (50 mL/Hr) IV Continuous <Continuous>  dextrose 50% Injectable 12.5 Gram(s) IV Push once  dextrose 50% Injectable 25 Gram(s) IV Push once  dextrose 50% Injectable 25 Gram(s) IV Push once  ergocalciferol Drops 6000 Unit(s) Oral daily  escitalopram 5 milliGRAM(s) Oral daily  fludroCORTISONE 0.1 milliGRAM(s) Oral daily  folic acid 1 milliGRAM(s) Oral daily  heparin  Infusion 1600 Unit(s)/Hr (17 mL/Hr) IV Continuous <Continuous>  insulin glargine Injectable (LANTUS) 21 Unit(s) SubCutaneous at bedtime  insulin regular  human corrective regimen sliding scale   SubCutaneous every 6 hours  insulin regular  human recombinant 7 Unit(s) SubCutaneous every 6 hours  levETIRAcetam  Solution 250 milliGRAM(s) Oral <User Schedule>  levETIRAcetam  Solution 500 milliGRAM(s) Oral every 24 hours  midodrine 15 milliGRAM(s) Oral every 8 hours  modafinil 100 milliGRAM(s) Oral <User Schedule>  multivitamin 1 Tablet(s) Oral daily  pantoprazole   Suspension 40 milliGRAM(s) Oral daily  predniSONE   Tablet 6 milliGRAM(s) Oral every 12 hours  psyllium Powder 1 Packet(s) Oral daily  thiamine 100 milliGRAM(s) Oral daily    MEDICATIONS  (PRN):  acetaminophen    Suspension 650 milliGRAM(s) Oral every 6 hours PRN For Temp greater than 38 C (100.4 F)  acetaminophen    Suspension. 650 milliGRAM(s) Oral every 6 hours PRN Moderate Pain (4 - 6)  dextrose Gel 1 Dose(s) Oral once PRN Blood Glucose LESS THAN 70 milliGRAM(s)/deciliter  glucagon  Injectable 1 milliGRAM(s) IntraMuscular once PRN Glucose LESS THAN 70 milligrams/deciliter      ALLERGIES:  Allergies    No Known Allergies    Intolerances        LABS:                        7.3    16.8  )-----------( 370      ( 24 Apr 2018 05:32 )             24.3     04-24    144  |  99  |  67<H>  ----------------------------<  113<H>  4.6   |  23  |  4.15<H>    Ca    9.0      24 Apr 2018 05:32  Phos  6.3     04-24  Mg     2.2     04-24      PTT - ( 24 Apr 2018 06:52 )  PTT:62.3 sec      RADIOLOGY & ADDITIONAL TESTS: Reviewed. ICU STEPDOWN TO 7LA  PGY-1 TRANSFER NOTE    HOSPITAL COURSE:  63M PMH HFrEF 10-15% (ischemic), MI (STEMI per [facility] records 7/17), s/p AICD, Afib, HTN, DM2 on insulin, CKD, and gout admitted for septic shock 2/2 LE cellulitis. Pt was started on levophed for hypotension and to help renal perfusion. Pt was also started on dobutamine given low mix venous saturation however had to be discontinued given tachycardia. Pt also experienced tachycardia on Milrinone. Pt was started on Vanco/ Zosyn and completed total 11 days of abx. PT was started on solucortef 50 q6h given recent hx of steroid use. Gallium scan showed only LLE  cellulitis. TTE with no vegetations. Given worsening GILMER and low UO, renal was consulted. HD cath was placed and pt was started on CVVD and eventually transitioned to intermittent HD. Pt was noted have b/l pulmonary effusion and required R chest tube placement with fluid studies showing exudative process. Given extensive cardiac hx and component of cardiogenic shock, cardiology was consulted. Pt was started on Vasopressin to improve blood pressure. Pt also stated on Isordil and Hydralazine but pt was not  able to tolerate the medications. Pt was started  on Hep gtt for Afib but was tthen placed on Argatroban given possibility of HIT. HIT was ruled out and patient was bridged to coumadin. Pt was stable for transfer to CCU for further management of cardiogenic shock however on 3/22, pt became unresponsive post A line placement. Vital signs were normal during event. Stroke code was called and he was intubated for airway protection. CT was negative MRI done 4 days later showed chronic infarcts with possible small brainstem stroke per neurology read. He was started on Modafinil however mental status did not improve significantly. VEEG showed slowing but no seizures. After GOC discussions with family patient had trach and PEG on 4/4 with plans to be discharged to [facility] when stable. On 4/8 patient was found to be fungemic with candida tropicalis and started on micafungin, then switched to fluconazole on 4/13 when sensitivities resulted. RUKHSANA was negative for vegetations or infection of AICD. CTAP also performed given transamnitis/ elevated bili but source of candidemia was not found. HD Permacath was removed 4/8 and was replaced on 4/12 when surveillance cultures had been negative for 48 hours. However overnight on 4/12 patient spiked fever again and blood, urine and sputum cultures returned positive for Klebsiella. He was started on meropenem for 1 day, then switched to Zosyn for 2 days and finally placed on ceftriaxone when sensitivities returned on 4/15. Patient restarted on hydrocortisone in attempt to wean off pressors. Thrombocytopenia resolved and patient restarted on heparin gtt for A-fib. Given continued low-grade fevers and uptrending white count, permacath removed on 4/19 and replaced with temp HD cath. The following weekend, noticed pt with seizure-like activity and VEEG placed. Per neuro, started on keppra 500 mg qd with additional 250cc keppra on post-HD days. End goal of L-tachy placement.  Transitioned to PO prednisone and fludrocortisone to replace hydrocortisone. L-tachy will not accept albumin pushes, and goal to wean patient off albumin during HD for hypotension. In addition, pt to have repeat MRI for prognostication of CVA. Permacath placed 4/25. Pt also noticed to have L forearm eschar that has enlarged and increasing fluctuance. LUE US ordered also showing R superficial thrombus; reordered saoft tissue US to eval for abscess in L forearm. Ortho for hand consulted for possible I&D. Rectal tube in place to be removed today. Pt to resume a/c with hep bridging to coumadin beginning tonight 4/25. Of note, pt is very sensitive to insulin. Has been getting nutritional insulin with basal insulin, however can become hypogylcemic requiring D10W and D50 amps with improvement in FSG. Pt remaining HDS and medically optimzied for step down to 7Lach.        INTERVAL HPI/OVERNIGHT EVENTS:  and 165 overnight, AM FSG 65 s/p 1 Amp D50     SUBJECTIVE: Patient seen and examined at bedside. NAD. No respiratory distress. Laying comfortably in bed. Awake and alert. Responsive to verbal and tactile stimuli. Tracks movements with eyes. Unable to assess ROS. Appears comfortable.        OBJECTIVE:    VITAL SIGNS:  ICU Vital Signs Last 24 Hrs  T(C): 36.4 (25 Apr 2018 00:02), Max: 37.1 (24 Apr 2018 22:33)  T(F): 97.6 (25 Apr 2018 00:02), Max: 98.8 (24 Apr 2018 22:33)  HR: 89 (25 Apr 2018 06:16) (80 - 100)  BP: 82/63 (25 Apr 2018 05:00) (74/55 - 98/72)  BP(mean): 68 (25 Apr 2018 05:00) (61 - 82)  ABP: --  ABP(mean): --  RR: 17 (25 Apr 2018 06:16) (3 - 30)  SpO2: 100% (25 Apr 2018 06:16) (94% - 100%)    Mode: CPAP with PS, FiO2: 40, PEEP: 5, PS: 10, ITime: 1, MAP: 8.7, PIP: 18    04-23 @ 07:01  -  04-24 @ 07:00  --------------------------------------------------------  IN: 1130 mL / OUT: 100 mL / NET: 1030 mL    04-24 @ 07:01  -  04-25 @ 06:26  --------------------------------------------------------  IN: 1422 mL / OUT: 1700 mL / NET: -278 mL      CAPILLARY BLOOD GLUCOSE      POCT Blood Glucose.: 165 mg/dL (24 Apr 2018 23:41)      PHYSICAL EXAM:    General: NAD, cachectic, chronically ill-appearing, no respiratory distress, more alert and awake   HEENT: NC/AT, PERRL, MM dry  Neck: supple, trach in place, L subclavian HD cath in place  Respiratory: coarse breath sounds b/l, otherwise CTA b/l, no W/R/R; no retractions or labored breathing  Cardiovascular: +S1/S2, RRR, no MRG  Abdomen: soft, mildly distended, nontender, +BS, PEG in place C/D/I  Extremities: WWP, 1+ pitting edema to level of thigh b/l, LUE with eschar on medial surface of forearm with surrounding tense edema, +fluctuance  Vasc: 2+ peripheral pulses b/l  Skin: dry, b/l LE with peeling skin  Neurological: nonverbal, following commands to open and close eyes, RUE flexed and rigid, LUE rigid, no spontaneous movement of b/l LE, alert and awake, tremor of RUE and facial muscles; able to move toes on command      MEDICATIONS:  MEDICATIONS  (STANDING):  aspirin  chewable 81 milliGRAM(s) Oral daily  atorvastatin 80 milliGRAM(s) Oral at bedtime  cefTRIAXone Injectable. 2000 milliGRAM(s) IV Push every 24 hours  chlorhexidine 0.12% Liquid 15 milliLiter(s) Swish and Spit two times a day  chlorhexidine 2% Cloths 1 Application(s) Topical daily  dextrose 5%. 1000 milliLiter(s) (50 mL/Hr) IV Continuous <Continuous>  dextrose 50% Injectable 12.5 Gram(s) IV Push once  dextrose 50% Injectable 25 Gram(s) IV Push once  dextrose 50% Injectable 25 Gram(s) IV Push once  ergocalciferol Drops 6000 Unit(s) Oral daily  escitalopram 5 milliGRAM(s) Oral daily  fludroCORTISONE 0.1 milliGRAM(s) Oral daily  folic acid 1 milliGRAM(s) Oral daily  heparin  Infusion 1600 Unit(s)/Hr (17 mL/Hr) IV Continuous <Continuous>  insulin glargine Injectable (LANTUS) 21 Unit(s) SubCutaneous at bedtime  insulin regular  human corrective regimen sliding scale   SubCutaneous every 6 hours  insulin regular  human recombinant 7 Unit(s) SubCutaneous every 6 hours  levETIRAcetam  Solution 250 milliGRAM(s) Oral <User Schedule>  levETIRAcetam  Solution 500 milliGRAM(s) Oral every 24 hours  midodrine 15 milliGRAM(s) Oral every 8 hours  modafinil 100 milliGRAM(s) Oral <User Schedule>  multivitamin 1 Tablet(s) Oral daily  pantoprazole   Suspension 40 milliGRAM(s) Oral daily  predniSONE   Tablet 6 milliGRAM(s) Oral every 12 hours  psyllium Powder 1 Packet(s) Oral daily  thiamine 100 milliGRAM(s) Oral daily    MEDICATIONS  (PRN):  acetaminophen    Suspension 650 milliGRAM(s) Oral every 6 hours PRN For Temp greater than 38 C (100.4 F)  acetaminophen    Suspension. 650 milliGRAM(s) Oral every 6 hours PRN Moderate Pain (4 - 6)  dextrose Gel 1 Dose(s) Oral once PRN Blood Glucose LESS THAN 70 milliGRAM(s)/deciliter  glucagon  Injectable 1 milliGRAM(s) IntraMuscular once PRN Glucose LESS THAN 70 milligrams/deciliter      ALLERGIES:  Allergies    No Known Allergies    Intolerances        LABS:                        7.3    16.8  )-----------( 370      ( 24 Apr 2018 05:32 )             24.3     04-24    144  |  99  |  67<H>  ----------------------------<  113<H>  4.6   |  23  |  4.15<H>    Ca    9.0      24 Apr 2018 05:32  Phos  6.3     04-24  Mg     2.2     04-24      PTT - ( 24 Apr 2018 06:52 )  PTT:62.3 sec      RADIOLOGY & ADDITIONAL TESTS: Reviewed. ICU STEPDOWN TO [facility]  PGY-1 TRANSFER NOTE    HOSPITAL COURSE:  63M PMH HFrEF 10-15% (ischemic), MI (STEMI per [facility] records 7/17), s/p AICD, Afib, HTN, DM2 on insulin, CKD, and gout admitted for septic shock 2/2 LE cellulitis. Pt was started on levophed for hypotension and to help renal perfusion. Pt was also started on dobutamine given low mix venous saturation however had to be discontinued given tachycardia. Pt also experienced tachycardia on Milrinone. Pt was started on Vanco/ Zosyn and completed total 11 days of abx. PT was started on solucortef 50 q6h given recent hx of steroid use. Gallium scan showed only LLE  cellulitis. TTE with no vegetations. Given worsening GILMER and low UO, renal was consulted. HD cath was placed and pt was started on CVVD and eventually transitioned to intermittent HD. Pt was noted have b/l pulmonary effusion and required R chest tube placement with fluid studies showing exudative process. Given extensive cardiac hx and component of cardiogenic shock, cardiology was consulted. Pt was started on Vasopressin to improve blood pressure. Pt also stated on Isordil and Hydralazine but pt was not  able to tolerate the medications. Pt was started  on Hep gtt for Afib but was tthen placed on Argatroban given possibility of HIT. HIT was ruled out and patient was bridged to coumadin. Pt was stable for transfer to CCU for further management of cardiogenic shock however on 3/22, pt became unresponsive post A line placement. Vital signs were normal during event. Stroke code was called and he was intubated for airway protection. CT was negative MRI done 4 days later showed chronic infarcts with possible small brainstem stroke per neurology read. RUKHSANA on 4/10 showed LV thrombus likely etiology for brainstem infarct. He was started on Modafinil however mental status did not improve significantly. VEEG showed slowing but no seizures. After GOC discussions with family patient had trach and PEG on 4/4 [facility]. On 4/8 patient was found to be fungemic with candida tropicalis and started on micafungin, then switched to fluconazole on 4/13 when sensitivities resulted. RUKHSANA was negative for vegetations or infection of AICD. CTAP also performed given transamnitis/ elevated bili but source of candidemia was not found. HD Permacath was removed 4/8 and was replaced on 4/12 when surveillance cultures had been negative for 48 hours. However overnight on 4/12 patient spiked fever again and blood, urine and sputum cultures returned positive for Klebsiella. He was started on meropenem for 1 day, then switched to Zosyn for 2 days and finally placed on ceftriaxone when sensitivities returned on 4/15. Patient restarted on hydrocortisone in attempt to wean off pressors. Thrombocytopenia resolved and patient restarted on heparin gtt for A-fib. Given continued low-grade fevers and uptrending white count, permacath removed on 4/19 and replaced with temp HD cath. The following weekend, noticed pt with seizure-like activity and VEEG placed. Per neuro, started on keppra 500 mg qd with additional 250cc keppra on post-HD days. End goal of L-tachy placement.  Transitioned to PO prednisone and fludrocortisone to replace hydrocortisone. L-tachy will not accept albumin pushes, and goal to wean patient off albumin during HD for hypotension. In addition, pt to have repeat MRI for prognostication of CVA. Permacath placed 4/25. Pt also noticed to have L forearm eschar that has enlarged and increasing fluctuance. LUE US ordered also showing R superficial thrombus; reordered saoft tissue US to eval for abscess in L forearm. Ortho for hand consulted for possible I&D. Rectal tube in place to be removed today. Pt to resume a/c with hep bridging to coumadin beginning tonight 4/25. Of note, pt is very sensitive to insulin. Has been getting nutritional insulin with basal insulin, however can become hypogylcemic requiring D10W and D50 amps with improvement in FSG. Pt remaining HDS and medically optimzied for step down to 7[facility]ach.        INTERVAL HPI/OVERNIGHT EVENTS:  and 165 overnight, AM FSG 65 s/p 1 Amp D50     SUBJECTIVE: Patient seen and examined at bedside. NAD. No respiratory distress. Laying comfortably in bed. Awake and alert. Responsive to verbal and tactile stimuli. Tracks movements with eyes. Unable to assess ROS. Appears comfortable.        OBJECTIVE:    VITAL SIGNS:  ICU Vital Signs Last 24 Hrs  T(C): 36.4 (25 Apr 2018 00:02), Max: 37.1 (24 Apr 2018 22:33)  T(F): 97.6 (25 Apr 2018 00:02), Max: 98.8 (24 Apr 2018 22:33)  HR: 89 (25 Apr 2018 06:16) (80 - 100)  BP: 82/63 (25 Apr 2018 05:00) (74/55 - 98/72)  BP(mean): 68 (25 Apr 2018 05:00) (61 - 82)  ABP: --  ABP(mean): --  RR: 17 (25 Apr 2018 06:16) (3 - 30)  SpO2: 100% (25 Apr 2018 06:16) (94% - 100%)    Mode: CPAP with PS, FiO2: 40, PEEP: 5, PS: 10, ITime: 1, MAP: 8.7, PIP: 18    04-23 @ 07:01  -  04-24 @ 07:00  --------------------------------------------------------  IN: 1130 mL / OUT: 100 mL / NET: 1030 mL    04-24 @ 07:01  -  04-25 @ 06:26  --------------------------------------------------------  IN: 1422 mL / OUT: 1700 mL / NET: -278 mL      CAPILLARY BLOOD GLUCOSE      POCT Blood Glucose.: 165 mg/dL (24 Apr 2018 23:41)      PHYSICAL EXAM:    General: NAD, cachectic, chronically ill-appearing, no respiratory distress, more alert and awake   HEENT: NC/AT, PERRL, MM dry  Neck: supple, trach in place, L subclavian HD cath in place  Respiratory: coarse breath sounds b/l, otherwise CTA b/l, no W/R/R; no retractions or labored breathing  Cardiovascular: +S1/S2, RRR, no MRG  Abdomen: soft, mildly distended, nontender, +BS, PEG in place C/D/I  Extremities: WWP, 1+ pitting edema to level of thigh b/l, LUE with eschar on medial surface of forearm with surrounding tense edema, +fluctuance  Vasc: 2+ peripheral pulses b/l  Skin: dry, b/l LE with peeling skin  Neurological: nonverbal, following commands to open and close eyes, RUE flexed and rigid, LUE rigid, no spontaneous movement of b/l LE, alert and awake, tremor of RUE and facial muscles; able to move toes on command      MEDICATIONS:  MEDICATIONS  (STANDING):  aspirin  chewable 81 milliGRAM(s) Oral daily  atorvastatin 80 milliGRAM(s) Oral at bedtime  cefTRIAXone Injectable. 2000 milliGRAM(s) IV Push every 24 hours  chlorhexidine 0.12% Liquid 15 milliLiter(s) Swish and Spit two times a day  chlorhexidine 2% Cloths 1 Application(s) Topical daily  dextrose 5%. 1000 milliLiter(s) (50 mL/Hr) IV Continuous <Continuous>  dextrose 50% Injectable 12.5 Gram(s) IV Push once  dextrose 50% Injectable 25 Gram(s) IV Push once  dextrose 50% Injectable 25 Gram(s) IV Push once  ergocalciferol Drops 6000 Unit(s) Oral daily  escitalopram 5 milliGRAM(s) Oral daily  fludroCORTISONE 0.1 milliGRAM(s) Oral daily  folic acid 1 milliGRAM(s) Oral daily  heparin  Infusion 1600 Unit(s)/Hr (17 mL/Hr) IV Continuous <Continuous>  insulin glargine Injectable (LANTUS) 21 Unit(s) SubCutaneous at bedtime  insulin regular  human corrective regimen sliding scale   SubCutaneous every 6 hours  insulin regular  human recombinant 7 Unit(s) SubCutaneous every 6 hours  levETIRAcetam  Solution 250 milliGRAM(s) Oral <User Schedule>  levETIRAcetam  Solution 500 milliGRAM(s) Oral every 24 hours  midodrine 15 milliGRAM(s) Oral every 8 hours  modafinil 100 milliGRAM(s) Oral <User Schedule>  multivitamin 1 Tablet(s) Oral daily  pantoprazole   Suspension 40 milliGRAM(s) Oral daily  predniSONE   Tablet 6 milliGRAM(s) Oral every 12 hours  psyllium Powder 1 Packet(s) Oral daily  thiamine 100 milliGRAM(s) Oral daily    MEDICATIONS  (PRN):  acetaminophen    Suspension 650 milliGRAM(s) Oral every 6 hours PRN For Temp greater than 38 C (100.4 F)  acetaminophen    Suspension. 650 milliGRAM(s) Oral every 6 hours PRN Moderate Pain (4 - 6)  dextrose Gel 1 Dose(s) Oral once PRN Blood Glucose LESS THAN 70 milliGRAM(s)/deciliter  glucagon  Injectable 1 milliGRAM(s) IntraMuscular once PRN Glucose LESS THAN 70 milligrams/deciliter      ALLERGIES:  Allergies    No Known Allergies    Intolerances        LABS:                        7.3    16.8  )-----------( 370      ( 24 Apr 2018 05:32 )             24.3     04-24    144  |  99  |  67<H>  ----------------------------<  113<H>  4.6   |  23  |  4.15<H>    Ca    9.0      24 Apr 2018 05:32  Phos  6.3     04-24  Mg     2.2     04-24      PTT - ( 24 Apr 2018 06:52 )  PTT:62.3 sec      RADIOLOGY & ADDITIONAL TESTS: Reviewed.

## 2018-04-25 NOTE — PROGRESS NOTE ADULT - SUBJECTIVE AND OBJECTIVE BOX
Patient seen and examined at bedside. Patient is a 63 year old male with a history of HFrEF 10-15% due to ischemic cardiomyopathy, s/p AICD, ?atrial fibrillation, hypertension, diabetes mellitus type 2, gout, and CKD for whom nephrology was called for GILMER from ATN requiring hemodialysis. Patient is clinically unchanged. Patient underwent dialysis on 4/24 with UF of 1.5 kg     acetaminophen    Suspension 650 milliGRAM(s) every 6 hours PRN  acetaminophen    Suspension. 650 milliGRAM(s) every 6 hours PRN  aspirin  chewable 81 milliGRAM(s) daily  atorvastatin 80 milliGRAM(s) at bedtime  cefTRIAXone Injectable. 2000 milliGRAM(s) every 24 hours  chlorhexidine 0.12% Liquid 15 milliLiter(s) two times a day  chlorhexidine 2% Cloths 1 Application(s) daily  dextrose 10%. 1000 milliLiter(s) <Continuous>  dextrose 5%. 1000 milliLiter(s) <Continuous>  dextrose 50% Injectable 12.5 Gram(s) once  dextrose 50% Injectable 25 Gram(s) once  dextrose 50% Injectable 25 Gram(s) once  dextrose Gel 1 Dose(s) once PRN  ergocalciferol Drops 6000 Unit(s) daily  escitalopram 5 milliGRAM(s) daily  fludroCORTISONE 0.1 milliGRAM(s) daily  folic acid 1 milliGRAM(s) daily  glucagon  Injectable 1 milliGRAM(s) once PRN  heparin  Infusion 1600 Unit(s)/Hr <Continuous>  insulin glargine Injectable (LANTUS) 21 Unit(s) at bedtime  insulin regular  human corrective regimen sliding scale   every 6 hours  insulin regular  human recombinant 7 Unit(s) every 6 hours  levETIRAcetam  Solution 250 milliGRAM(s) <User Schedule>  levETIRAcetam  Solution 500 milliGRAM(s) every 24 hours  midodrine 15 milliGRAM(s) every 8 hours  modafinil 100 milliGRAM(s) <User Schedule>  multivitamin 1 Tablet(s) daily  pantoprazole   Suspension 40 milliGRAM(s) daily  predniSONE   Tablet 6 milliGRAM(s) every 12 hours  psyllium Powder 1 Packet(s) daily  thiamine 100 milliGRAM(s) daily    Allergies    No Known Allergies    Intolerances    T(C): , Max: 37.1 (04-24-18 @ 22:33)  T(F): , Max: 98.8 (04-24-18 @ 22:33)  HR: 92 (04-25-18 @ 09:00)  BP: 86/69 (04-25-18 @ 09:00)  BP(mean): 74 (04-25-18 @ 09:00)  RR: 24 (04-25-18 @ 09:00)  SpO2: 100% (04-25-18 @ 09:00)    04-24 @ 07:01  -  04-25 @ 07:00  --------------------------------------------------------  IN:    Enteral Tube Flush: 380 mL    heparin Infusion: 357 mL    Solution: 100 mL    Vital HN: 746 mL  Total IN: 1583 mL    OUT:    Other: 1500 mL    Rectal Tube: 200 mL  Total OUT: 1700 mL    Total NET: -117 mL    04-25 @ 07:01  -  04-25 @ 10:06  --------------------------------------------------------  IN:    heparin Infusion: 17 mL  Total IN: 17 mL    OUT:  Total OUT: 0 mL    Total NET: 17 mL        Height (cm): 190.5 (04-24 @ 10:06)    LABS:                        8.0    12.8  )-----------( 308      ( 25 Apr 2018 06:33 )             27.3     04-25    140  |  93<L>  |  50<H>  ----------------------------<  66<L>  4.2   |  24  |  3.29<H>    Ca    9.1      25 Apr 2018 06:33  Phos  5.4     04-25  Mg     2.0     04-25    PT/INR - ( 25 Apr 2018 06:33 )   PT: 14.5 sec;   INR: 1.30        PTT - ( 25 Apr 2018 06:33 )  PTT:82.7 sec          RADIOLOGY & ADDITIONAL STUDIES:

## 2018-04-25 NOTE — PROGRESS NOTE ADULT - PROBLEM SELECTOR PLAN 5
Likely 2/2 phlebotomy and CKD, no signs of bleeding. Prior studies showed Anemia of chronic disease.   - monitor CBC, transfuse if Hgb <7

## 2018-04-26 LAB
ANION GAP SERPL CALC-SCNC: 22 MMOL/L — HIGH (ref 5–17)
APTT BLD: 73.2 SEC — HIGH (ref 27.5–37.4)
BUN SERPL-MCNC: 61 MG/DL — HIGH (ref 7–23)
CALCIUM SERPL-MCNC: 8.8 MG/DL — SIGNIFICANT CHANGE UP (ref 8.4–10.5)
CHLORIDE SERPL-SCNC: 89 MMOL/L — LOW (ref 96–108)
CO2 SERPL-SCNC: 21 MMOL/L — LOW (ref 22–31)
CREAT SERPL-MCNC: 3.74 MG/DL — HIGH (ref 0.5–1.3)
GLUCOSE BLDC GLUCOMTR-MCNC: 121 MG/DL — HIGH (ref 70–99)
GLUCOSE BLDC GLUCOMTR-MCNC: 151 MG/DL — HIGH (ref 70–99)
GLUCOSE BLDC GLUCOMTR-MCNC: 198 MG/DL — HIGH (ref 70–99)
GLUCOSE BLDC GLUCOMTR-MCNC: 202 MG/DL — HIGH (ref 70–99)
GLUCOSE BLDC GLUCOMTR-MCNC: 213 MG/DL — HIGH (ref 70–99)
GLUCOSE BLDC GLUCOMTR-MCNC: 256 MG/DL — HIGH (ref 70–99)
GLUCOSE SERPL-MCNC: 240 MG/DL — HIGH (ref 70–99)
HCT VFR BLD CALC: 25 % — LOW (ref 39–50)
HGB BLD-MCNC: 7.4 G/DL — LOW (ref 13–17)
INR BLD: 1.3 — HIGH (ref 0.88–1.16)
MAGNESIUM SERPL-MCNC: 2 MG/DL — SIGNIFICANT CHANGE UP (ref 1.6–2.6)
MCHC RBC-ENTMCNC: 28.7 PG — SIGNIFICANT CHANGE UP (ref 27–34)
MCHC RBC-ENTMCNC: 29.6 G/DL — LOW (ref 32–36)
MCV RBC AUTO: 96.9 FL — SIGNIFICANT CHANGE UP (ref 80–100)
PHOSPHATE SERPL-MCNC: 6.7 MG/DL — HIGH (ref 2.5–4.5)
PLATELET # BLD AUTO: 297 K/UL — SIGNIFICANT CHANGE UP (ref 150–400)
POTASSIUM SERPL-MCNC: 4.4 MMOL/L — SIGNIFICANT CHANGE UP (ref 3.5–5.3)
POTASSIUM SERPL-SCNC: 4.4 MMOL/L — SIGNIFICANT CHANGE UP (ref 3.5–5.3)
PROTHROM AB SERPL-ACNC: 14.5 SEC — HIGH (ref 9.8–12.7)
RBC # BLD: 2.58 M/UL — LOW (ref 4.2–5.8)
RBC # FLD: 18.9 % — HIGH (ref 10.3–16.9)
SODIUM SERPL-SCNC: 132 MMOL/L — LOW (ref 135–145)
WBC # BLD: 14.4 K/UL — HIGH (ref 3.8–10.5)
WBC # FLD AUTO: 14.4 K/UL — HIGH (ref 3.8–10.5)

## 2018-04-26 PROCEDURE — 99233 SBSQ HOSP IP/OBS HIGH 50: CPT

## 2018-04-26 PROCEDURE — 76882 US LMTD JT/FCL EVL NVASC XTR: CPT | Mod: 26,LT

## 2018-04-26 PROCEDURE — 99233 SBSQ HOSP IP/OBS HIGH 50: CPT | Mod: GC

## 2018-04-26 RX ORDER — ERYTHROPOIETIN 10000 [IU]/ML
7000 INJECTION, SOLUTION INTRAVENOUS; SUBCUTANEOUS ONCE
Qty: 0 | Refills: 0 | Status: COMPLETED | OUTPATIENT
Start: 2018-04-26 | End: 2018-04-26

## 2018-04-26 RX ORDER — WARFARIN SODIUM 2.5 MG/1
5 TABLET ORAL ONCE
Qty: 0 | Refills: 0 | Status: COMPLETED | OUTPATIENT
Start: 2018-04-26 | End: 2018-04-26

## 2018-04-26 RX ORDER — DOXERCALCIFEROL 2.5 UG/1
2 CAPSULE ORAL ONCE
Qty: 0 | Refills: 0 | Status: COMPLETED | OUTPATIENT
Start: 2018-04-26 | End: 2018-04-26

## 2018-04-26 RX ADMIN — Medication 1 TABLET(S): at 13:00

## 2018-04-26 RX ADMIN — INSULIN HUMAN 2: 100 INJECTION, SOLUTION SUBCUTANEOUS at 12:56

## 2018-04-26 RX ADMIN — DOXERCALCIFEROL 2 MICROGRAM(S): 2.5 CAPSULE ORAL at 09:48

## 2018-04-26 RX ADMIN — CHLORHEXIDINE GLUCONATE 15 MILLILITER(S): 213 SOLUTION TOPICAL at 22:41

## 2018-04-26 RX ADMIN — INSULIN HUMAN 7 UNIT(S): 100 INJECTION, SOLUTION SUBCUTANEOUS at 00:59

## 2018-04-26 RX ADMIN — CEFTRIAXONE 2000 MILLIGRAM(S): 500 INJECTION, POWDER, FOR SOLUTION INTRAMUSCULAR; INTRAVENOUS at 18:13

## 2018-04-26 RX ADMIN — HEPARIN SODIUM 16 UNIT(S)/HR: 5000 INJECTION INTRAVENOUS; SUBCUTANEOUS at 10:10

## 2018-04-26 RX ADMIN — INSULIN HUMAN 7 UNIT(S): 100 INJECTION, SOLUTION SUBCUTANEOUS at 07:24

## 2018-04-26 RX ADMIN — Medication 1 PACKET(S): at 13:09

## 2018-04-26 RX ADMIN — ERGOCALCIFEROL 6000 UNIT(S): 1.25 CAPSULE ORAL at 13:28

## 2018-04-26 RX ADMIN — Medication 1 MILLIGRAM(S): at 13:20

## 2018-04-26 RX ADMIN — ERYTHROPOIETIN 7000 UNIT(S): 10000 INJECTION, SOLUTION INTRAVENOUS; SUBCUTANEOUS at 09:48

## 2018-04-26 RX ADMIN — LEVETIRACETAM 250 MILLIGRAM(S): 250 TABLET, FILM COATED ORAL at 18:14

## 2018-04-26 RX ADMIN — INSULIN HUMAN 4: 100 INJECTION, SOLUTION SUBCUTANEOUS at 01:00

## 2018-04-26 RX ADMIN — ESCITALOPRAM OXALATE 5 MILLIGRAM(S): 10 TABLET, FILM COATED ORAL at 13:01

## 2018-04-26 RX ADMIN — MODAFINIL 100 MILLIGRAM(S): 200 TABLET ORAL at 12:58

## 2018-04-26 RX ADMIN — Medication 100 MILLIGRAM(S): at 13:01

## 2018-04-26 RX ADMIN — INSULIN HUMAN 7 UNIT(S): 100 INJECTION, SOLUTION SUBCUTANEOUS at 12:56

## 2018-04-26 RX ADMIN — CHLORHEXIDINE GLUCONATE 1 APPLICATION(S): 213 SOLUTION TOPICAL at 17:42

## 2018-04-26 RX ADMIN — Medication 6 MILLIGRAM(S): at 13:29

## 2018-04-26 RX ADMIN — CHLORHEXIDINE GLUCONATE 15 MILLILITER(S): 213 SOLUTION TOPICAL at 13:28

## 2018-04-26 RX ADMIN — LEVETIRACETAM 500 MILLIGRAM(S): 250 TABLET, FILM COATED ORAL at 13:18

## 2018-04-26 RX ADMIN — WARFARIN SODIUM 5 MILLIGRAM(S): 2.5 TABLET ORAL at 22:44

## 2018-04-26 RX ADMIN — PANTOPRAZOLE SODIUM 40 MILLIGRAM(S): 20 TABLET, DELAYED RELEASE ORAL at 13:01

## 2018-04-26 RX ADMIN — MODAFINIL 100 MILLIGRAM(S): 200 TABLET ORAL at 07:00

## 2018-04-26 RX ADMIN — INSULIN HUMAN 7 UNIT(S): 100 INJECTION, SOLUTION SUBCUTANEOUS at 18:12

## 2018-04-26 RX ADMIN — INSULIN GLARGINE 21 UNIT(S): 100 INJECTION, SOLUTION SUBCUTANEOUS at 22:41

## 2018-04-26 RX ADMIN — Medication 6 MILLIGRAM(S): at 01:04

## 2018-04-26 RX ADMIN — ATORVASTATIN CALCIUM 80 MILLIGRAM(S): 80 TABLET, FILM COATED ORAL at 22:42

## 2018-04-26 RX ADMIN — FLUDROCORTISONE ACETATE 0.1 MILLIGRAM(S): 0.1 TABLET ORAL at 06:15

## 2018-04-26 RX ADMIN — MIDODRINE HYDROCHLORIDE 15 MILLIGRAM(S): 2.5 TABLET ORAL at 06:49

## 2018-04-26 RX ADMIN — MIDODRINE HYDROCHLORIDE 15 MILLIGRAM(S): 2.5 TABLET ORAL at 22:43

## 2018-04-26 RX ADMIN — INSULIN HUMAN 2: 100 INJECTION, SOLUTION SUBCUTANEOUS at 18:12

## 2018-04-26 RX ADMIN — MIDODRINE HYDROCHLORIDE 15 MILLIGRAM(S): 2.5 TABLET ORAL at 13:29

## 2018-04-26 RX ADMIN — Medication 81 MILLIGRAM(S): at 13:00

## 2018-04-26 RX ADMIN — INSULIN HUMAN 6: 100 INJECTION, SOLUTION SUBCUTANEOUS at 07:23

## 2018-04-26 NOTE — PROGRESS NOTE ADULT - ASSESSMENT
Mental status improved. Weakness likely combination of cerebral ischemia, spinal cord compression, and critical illness polyneuropathy / myopathy.     If patient is deemed stable enough for potential surgical intervention on cervical spine, would recommend C spine MRI for further evaluation of cord compression.

## 2018-04-26 NOTE — PROGRESS NOTE ADULT - PROBLEM SELECTOR PLAN 2
During course, noted to have Klebsiella growing in Urine, blood and sputum. Permacath removed on 4/19 for concern of potential source but cath tip negative growth.  Initially started on Meropenem (1 day), switched to Zosyn (2 days) and narrowed to Ceftriaxone based on sensitivities.   -C/w Ceftriaxone 2g q24h (4/15-4/26- last day today).  -surveillance blood cultures remain no growth.    #Eschar  Noted eschar on LUE with some soft tissue swelling surrounding. Ortho hand consulted and pending ultrasound for soft tissue of LUE.   - Called ortho hand again this AM to follow up, as it appears he has not been seen yet. Pending discussion and evaluation.    #Candidemia with candida tropicalis  Blood cultures positive for candida tropicalis on 4/7 and again on 4/9. No source identified. CTAP 4/11 negative for abscess. RUKHSANA negative for vegetations/ infection of AICD; Tunelled HD catheter removed 4/8, temp HD placed 4/10, permanent HD cath placed 4/12. Permacath removed 4/19 Completed Fluconazole 200mg q 24hrs (4/13-4/24).  - Surveillance fungal cultures 4/10 NGTD    #UTI- urine culture from 4/11 growing klebsiella sensitive to ceftriaxone, and now bacteremia with GNR likely 2/2 klebsiella (in urine and lung).  - c/w ceftriaxone (today is last day)    #Elevated AG- at 21 this AM- mild decrease from yesterday. Neg lactate, glucose has been well controlled, possibly 2/2 uremia in setting of ESRD.   - Monitor BMP During course, noted to have Klebsiella growing in Urine, blood and sputum. Permacath removed on 4/19 for concern of potential source but cath tip negative growth.  Initially started on Meropenem (1 day), switched to Zosyn (2 days) and narrowed to Ceftriaxone based on sensitivities.   -C/w Ceftriaxone 2g q24h (4/15-4/26) TODAY IS LAST DAY.   -surveillance blood cultures remain no growth- this means that bacteremia has resolved.    #Eschar  Noted eschar on LUE with some soft tissue swelling surrounding. Ortho hand consulted and pending ultrasound for soft tissue of LUE.   - Called ortho hand again this AM to follow up, as it appears he has not been seen yet. Pending discussion and evaluation.    #Candidemia with candida tropicalis  Blood cultures positive for candida tropicalis on 4/7 and again on 4/9. No source identified. CTAP 4/11 negative for abscess. RUKHSANA negative for vegetations/ infection of AICD; Tunelled HD catheter removed 4/8, temp HD placed 4/10, permanent HD cath placed 4/12. Permacath removed 4/19 Completed Fluconazole 200mg q 24hrs (4/13-4/24).  - Surveillance fungal cultures 4/10- For which means that fungemia has resolved.     #UTI- urine culture from 4/11 growing klebsiella sensitive to ceftriaxone, and now bacteremia with GNR likely 2/2 klebsiella (in urine and lung).  - c/w ceftriaxone (today is last day)    #Elevated AG- at 21 this AM- mild decrease from yesterday. Neg lactate, glucose has been well controlled, possibly 2/2 uremia in setting of ESRD.   - Monitor BMP

## 2018-04-26 NOTE — PROGRESS NOTE ADULT - PROBLEM SELECTOR PLAN 6
DM2 on Januvia at home. HbA1C 8.6. Has been on Lantus 42U qHS, insulin 7 units q6h. Insulin was decreased to 21U for being NPO but noted to have complication of hypoglycemia.   -c/w MISS, Lantus 21 qHS and restart regular insulin 7U q6h.    #Hypoglycemia  Noted to have complication of hypoglycemia with FS in 60s. Started on D10 while NPO for permacath.  -Cautious with ***

## 2018-04-26 NOTE — PROGRESS NOTE ADULT - PROBLEM SELECTOR PLAN 1
Patient is a 63  year old male with acute on chronic renal failure from ischemic ATN. Patient is currently requiring hemodialysis. Patient was last dialyzed 4/24 with UF of 1.5 kg    P - Dialysis today  Revaclear 300, , , 3K bath   Goal UF 1kg over 3 hours  Cool dialysate   UF profile 1  Does NOT need albumin during dialysis

## 2018-04-26 NOTE — PROGRESS NOTE ADULT - PROBLEM SELECTOR PLAN 10
VTE: Coumadin  GI PPx: PPI    FULL CODE    F: No IVF  E: Replete electrolytes to maintain K>4 and Mg>2  N:  Restarted on tube feeds with Vital 1.5 Terrell at 63cc/hr    Dispo: Initially admitted to MICU but now on 7LACH for further management of hypotension in setting of shock, acute encephalopathy, bacteremia and acute on chronic respiratory failure. Dispo with stabilization for placement at L-TACH.

## 2018-04-26 NOTE — PROGRESS NOTE ADULT - PROBLEM SELECTOR PLAN 9
Hx of Afib and LV thrombus on coumadin prior to admission. TTE with Definity shows no LV thrombus currently   - c/w holding Metoprolol 12.5 q12h given hypotension and HR as been controlled in 70-90s. Will use Dig for rate control if RVR- goal <110  -c/w hep gtt and plan for bridge to coumadin- dosed for coumadin tonight.   -Repeat PTT tonight as held for IR procedure.     #LV thrombus  LV thrombus noted on RUKHSANA on 4/10 for which patient has been on hep gtt. Plan to bridge to coumadin tonight.   -dosed for coumadin o/n. INR this AM at 1.3.

## 2018-04-26 NOTE — PROGRESS NOTE ADULT - PROBLEM SELECTOR PLAN 4
Acute on chronic renal failure- likely ischemic ATN in setting of sepsis with low intravascular volume. Unknown baseline Cr presenting with Cr 3.93 with metabolic derangements. Renal following and has started HD. HD catheter removed 4/8 due to fungemia, switched on 4/19 and replaced with L subclavian HD cath. Yesterday 4/25 Permacath again replaced and now on R chest wall.  - Last HD 4/24, follow up renal for further recommendations regarding HD (now with new permacath on R chest wall)  - plan to attempt to wean albumin off with HD- plan for trial without albumin for hypotension    #Hyperkalemia  PT with intermittently elevated K, in setting of Acute on chronic renal failure. Has had no significant EKG changes.  - c/w dialysis   - c/w monitoring BMP and kayexalate PRN

## 2018-04-26 NOTE — CHART NOTE - NSCHARTNOTEFT_GEN_A_CORE
Admitting Diagnosis:   63M PMH HFrEF 10-15% (ischemic), MI, s/p AICD vs PPM, possible Afib, HTN, DM2 on insulin, possible CKD, and gout, who presents with a chief complaint of generalized weakness s/p septic shock likely 2/2 LE cellulitis. AMS and new leukocytisis likely 2/2 UTI. Trach and PEG dependent.       PAST MEDICAL & SURGICAL HISTORY:  Type 2 diabetes mellitus with diabetic peripheral angiopathy without gangrene, with long-term curren  Essential hypertension, benign  Gout  Pacemaker  Chronic systolic heart failure  Myocardial infarction  No significant past surgical history      Current Nutrition Order:  Ordered for Vital 1.5 @ 63ml/hr x 24hr via PEG+ 2x prostat (1512ml TV, 2468kcal, 132g, 1155ml free H2O, 151%RDI)- running at goal       PO Intake: Good (%) [   ]  Fair (50-75%) [   ] Poor (<25%) [   ]- N/A TF dependent    GI Issues: No N/V noted by RN; diarrhea resolving, rectal tube removed    Pain: Unable to assess at this time 2/2 vent     Skin Integrity:   L buttock stage 2 PU  L calf venous ulcer  Trach stage 3 PU  L. UE eschar     Labs:       132<L>  |  89<L>  |  61<H>  ----------------------------<  240<H>  4.4   |  21<L>  |  3.74<H>    Ca    8.8      2018 06:05  Phos  6.7       Mg     2.0           CAPILLARY BLOOD GLUCOSE      POCT Blood Glucose.: 151 mg/dL (2018 11:59)  POCT Blood Glucose.: 202 mg/dL (2018 11:05)  POCT Blood Glucose.: 256 mg/dL (2018 07:13)  POCT Blood Glucose.: 213 mg/dL (2018 00:31)  POCT Blood Glucose.: 158 mg/dL (2018 22:30)  POCT Blood Glucose.: 105 mg/dL (2018 19:15)  POCT Blood Glucose.: 75 mg/dL (2018 18:00)  POCT Blood Glucose.: 101 mg/dL (2018 17:29)  POCT Blood Glucose.: 108 mg/dL (2018 16:01)      Medications:  MEDICATIONS  (STANDING):  aspirin  chewable 81 milliGRAM(s) Oral daily  atorvastatin 80 milliGRAM(s) Oral at bedtime  cefTRIAXone Injectable. 2000 milliGRAM(s) IV Push every 24 hours  chlorhexidine 0.12% Liquid 15 milliLiter(s) Swish and Spit two times a day  chlorhexidine 2% Cloths 1 Application(s) Topical daily  dextrose 5%. 1000 milliLiter(s) (50 mL/Hr) IV Continuous <Continuous>  dextrose 50% Injectable 12.5 Gram(s) IV Push once  dextrose 50% Injectable 25 Gram(s) IV Push once  dextrose 50% Injectable 25 Gram(s) IV Push once  ergocalciferol Drops 6000 Unit(s) Oral daily  escitalopram 5 milliGRAM(s) Oral daily  fludroCORTISONE 0.1 milliGRAM(s) Oral daily  folic acid 1 milliGRAM(s) Oral daily  heparin  Infusion 1600 Unit(s)/Hr (16 mL/Hr) IV Continuous <Continuous>  insulin glargine Injectable (LANTUS) 21 Unit(s) SubCutaneous at bedtime  insulin regular  human corrective regimen sliding scale   SubCutaneous every 6 hours  insulin regular  human recombinant 7 Unit(s) SubCutaneous every 6 hours  levETIRAcetam  Solution 250 milliGRAM(s) Oral <User Schedule>  levETIRAcetam  Solution 500 milliGRAM(s) Oral every 24 hours  midodrine 15 milliGRAM(s) Oral every 8 hours  modafinil 100 milliGRAM(s) Oral <User Schedule>  multivitamin 1 Tablet(s) Oral daily  pantoprazole   Suspension 40 milliGRAM(s) Oral daily  predniSONE   Tablet 6 milliGRAM(s) Oral every 12 hours  psyllium Powder 1 Packet(s) Oral daily  thiamine 100 milliGRAM(s) Oral daily  warfarin 5 milliGRAM(s) Oral once    MEDICATIONS  (PRN):  acetaminophen    Suspension 650 milliGRAM(s) Oral every 6 hours PRN For Temp greater than 38 C (100.4 F)  acetaminophen    Suspension. 650 milliGRAM(s) Oral every 6 hours PRN Moderate Pain (4 - 6)  dextrose Gel 1 Dose(s) Oral once PRN Blood Glucose LESS THAN 70 milliGRAM(s)/deciliter  glucagon  Injectable 1 milliGRAM(s) IntraMuscular once PRN Glucose LESS THAN 70 milligrams/deciliter      Weight:  Daily     Daily Weight in k.9 ()    Weight:  77.4kg ()  72.7kg ()  77.2kg ()  80.9 kg ()  84.5kg (3/31)  86.9kg (3/19)  94.7 kg (3/16)    Weight Change:   Weight fluctuating throughout admission d/t HD, TF inconsistencies     Estimated energy needs using 89 kg IBW; Needs estimated / vent/post-op/PU  Calories: 25-30 kcal/kg = 6714-8314 kcal/day  Protein: 1.4-1.6 g/kg = 125-142 g protein/day  Fluids: per team / HD     Subjective:   S/p trach and PEG placements on . S/p permacath replacement on . Tx to SDU. Pt seen in room, asleep, not arousable to verbal stimuli. Pt remains trached to vent, CPAP mode. MAP 86. Tolerated HD this morning, did not require albumin per MD note. TF currently running at goal. Phos slightly high today (6.7) GOC discussion still pending; plan for DC to LTACH when stable.     Previous Nutrition Diagnosis:   Increased protein-calorie needs RT increased demand for protein-calorie intake AEB on vent support    Active [X]  Resolved [   ]    New PES statement:     Goal:   Continue to meet % of nutrition needs via tolerated route.     Recommendations:  1. Continue Vital 1.5 via PEG @ 63mL/hr x 24hrs plus ProStat Sugar Free BID (200 kcal, 30g protein)   2. Continue to trend weights  3 Monitor POC BG   4. Continue to monitor for GOC and keep nutrition in line at all times     Education:   N/A-vent    Risk Level: High [X] Moderate [   ] Low [   ]

## 2018-04-26 NOTE — PROGRESS NOTE ADULT - PROBLEM SELECTOR PLAN 3
Patient acutely unresponsive since 3/22 with waxing/ waning mental status. CT, CTA negative. VEEG w/ no seizure activity. Decline in mental status likely 2/2 to underlying infection vs acute neurologic event. 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 Per neurology may have had ischemic event from hypoperfusion. Also showing disc protrusion at C3/C4 level coursing superiorly to the C2/C3 contacting the ventral spinal cord.  S/p PEG/trach on 4/4. Repeat CT head 4/11 performed due to concern for not moving LUE, showing only chronic infarctions. CT C-spine w/o contrast showing multilevel degenerative changes with mod-severe foraminal narrowing @ C3-C4  - C/w Modafinil  - General neurology following with no interventions at this time.  - Pending repeat MRI head for prognostication of CVA  -Noted improvement on neuro exam today. More awake/alert and more frequently following commands.

## 2018-04-26 NOTE — PROGRESS NOTE ADULT - SUBJECTIVE AND OBJECTIVE BOX
Patient was seen and evaluated on dialysis.   Patient is tolerating the procedure well.   HR: 105 (04-26-18 @ 09:35)  BP: 97/71 (04-26-18 @ 09:35)  Continue dialysis:   Revaclear 300, , , 3K bath   Goal UF 1kg over 3 hours  Cool dialysate   UF profile 1  Maintain MAP > 65   Does NOT need albumin during dialysis.

## 2018-04-26 NOTE — PROGRESS NOTE ADULT - ASSESSMENT
63M PMH HFrEF 10-15% (ischemic), MI, s/p AICD vs PPM, possible Afib, HTN, DM2 on insulin, possible CKD, and gout, who presented with a chief complaint of generalized weakness and admitted to MICU for septic shock likely 2/2 LE cellulitis  as well as ATN requiring hemodialysis. Course  complicated by altered mental status likely secondary to acute brainstem infarct. Course also complicated by candidemia (resolving) and sepsis 2/2 klebsiella bacteremia. He was initially treated with meropenem, switched to zosyn, narrowed to ceftriaxone with sensitivites. Hydrocortisone was discontinued due to infection. Patient continued to spike low grade fevers, complications of hypotension requiring levophed and has been transitioned to midodrine 15mg TID and albumin during HD. Now off pressors patient to be further managed on 7LACH.  Pending ability to wean off albumin with HD for dispo for potential LTACH.

## 2018-04-26 NOTE — CONSULT NOTE ADULT - SUBJECTIVE AND OBJECTIVE BOX
Consult: Right forearm eschar possible absess.    63M with a hx of HFrEF 10-15% (ischemic), MI, s/p AICD vs PPM, possible Afib, HTN, DM2 on insulin, possible CKD, and gout, who was recently admitted to MICU for septic shock likely 2/2 LE cellulitis as well as ATN requiring hemodialysis. Course was complicated by 1) AMS likely secondary to acute brainstem infarct and 2) candidemia (resolving) and sepsis 2/2 klebsiella bacteremia. Patient's sepsis has resolved, blood cxs have been negative and patient has been afebrile with stable vitals. Dispo per medical team for potential LTACH.     Exam  General: Patient is not responsive to commands, non-verbal, currently trach'd and PEG'd.  Right UE  - Contracted position of elbow, wrist and hand.  - Black eschar (3 x 3 cm) just distal to antecubital fossa with mild overlying erythema and no appreciable fluctuance. No fluid expressible from around edges of eschar. Warm to touch.   - Radial pulse 2+, cap refill < 2 sec.   - Adequate sensory / motor exam not obtainable as patient is not responsive to commands or painful stimuli.     Imaging  - US of forearm performed demonstrating no discrete or drainable abscess.    Vital Signs Last 24 Hrs  T(C): 36.6 (26 Apr 2018 17:00), Max: 36.8 (26 Apr 2018 09:35)  T(F): 97.9 (26 Apr 2018 17:00), Max: 98.3 (26 Apr 2018 09:35)  HR: 101 (26 Apr 2018 16:37) (80 - 116)  BP: 112/79 (26 Apr 2018 16:00) (88/67 - 112/79)  BP(mean): 91 (26 Apr 2018 16:00) (73 - 91)  RR: 23 (26 Apr 2018 12:35) (11 - 23)  SpO2: 100% (26 Apr 2018 16:37) (99% - 100%)    25 Apr 2018 07:01  -  26 Apr 2018 07:00  --------------------------------------------------------  IN: 581 mL / OUT: 0 mL / NET: 581 mL    26 Apr 2018 07:01  -  26 Apr 2018 19:55  --------------------------------------------------------  IN: 0 mL / OUT: 1000 mL / NET: -1000 mL                            7.4    14.4  )-----------( 297      ( 26 Apr 2018 06:06 )             25.0     04-26    132<L>  |  89<L>  |  61<H>  ----------------------------<  240<H>  4.4   |  21<L>  |  3.74<H>    Ca    8.8      26 Apr 2018 06:05  Phos  6.7     04-26  Mg     2.0     04-26        PT/INR - ( 26 Apr 2018 06:08 )   PT: 14.5 sec;   INR: 1.30          PTT - ( 26 Apr 2018 06:08 )  PTT:73.2 sec  MEDICATIONS  (STANDING):  aspirin  chewable 81 milliGRAM(s) Oral daily  atorvastatin 80 milliGRAM(s) Oral at bedtime  chlorhexidine 0.12% Liquid 15 milliLiter(s) Swish and Spit two times a day  chlorhexidine 2% Cloths 1 Application(s) Topical daily  dextrose 5%. 1000 milliLiter(s) (50 mL/Hr) IV Continuous <Continuous>  dextrose 50% Injectable 12.5 Gram(s) IV Push once  dextrose 50% Injectable 25 Gram(s) IV Push once  dextrose 50% Injectable 25 Gram(s) IV Push once  ergocalciferol Drops 6000 Unit(s) Oral daily  escitalopram 5 milliGRAM(s) Oral daily  fludroCORTISONE 0.1 milliGRAM(s) Oral daily  folic acid 1 milliGRAM(s) Oral daily  heparin  Infusion 1600 Unit(s)/Hr (16 mL/Hr) IV Continuous <Continuous>  insulin glargine Injectable (LANTUS) 21 Unit(s) SubCutaneous at bedtime  insulin regular  human corrective regimen sliding scale   SubCutaneous every 6 hours  insulin regular  human recombinant 7 Unit(s) SubCutaneous every 6 hours  levETIRAcetam  Solution 250 milliGRAM(s) Oral <User Schedule>  levETIRAcetam  Solution 500 milliGRAM(s) Oral every 24 hours  midodrine 15 milliGRAM(s) Oral every 8 hours  modafinil 100 milliGRAM(s) Oral <User Schedule>  multivitamin 1 Tablet(s) Oral daily  pantoprazole   Suspension 40 milliGRAM(s) Oral daily  predniSONE   Tablet 6 milliGRAM(s) Oral every 12 hours  psyllium Powder 1 Packet(s) Oral daily  thiamine 100 milliGRAM(s) Oral daily  warfarin 5 milliGRAM(s) Oral once    MEDICATIONS  (PRN):  acetaminophen    Suspension 650 milliGRAM(s) Oral every 6 hours PRN For Temp greater than 38 C (100.4 F)  acetaminophen    Suspension. 650 milliGRAM(s) Oral every 6 hours PRN Moderate Pain (4 - 6)  dextrose Gel 1 Dose(s) Oral once PRN Blood Glucose LESS THAN 70 milliGRAM(s)/deciliter  glucagon  Injectable 1 milliGRAM(s) IntraMuscular once PRN Glucose LESS THAN 70 milligrams/deciliter      63y consulted for concern of right forearm abscess, likely chronic eschar w mild cellulitis.  - WBAT affected extremity  - Keep elevated (forearm in vertical position)  - Anti-inflammatories, PO abx  - Silvadene dressing daily over eschar  - No surgical intervention at this time  - Plan discussed w attending on call.

## 2018-04-26 NOTE — CONSULT NOTE ADULT - SUBJECTIVE AND OBJECTIVE BOX
HPI: 63M with multiple medical problems evaluated for concern of fluctuance underlying left upper extremity eschar. Pt on ventilator with trach and peg, nonverbal and minimally responsive to stimuli.     PE: Nonverbal, minimal response to painful or verbal stimuli. Not following commands. On ventilator with trach and with peg  ~3cm eschar just distal to antecubital fossa on left forearm. Minimal surrounding erythema. No streaking. No drainage  +palpable fluctuance. No palpable warmth  Radial pulses 2+, fingers warm and well perfused, cap refill brisk  Unable to assess strength or sensation. No movement of bilateral upper extremities visualized, no withdrawal of extremities 2/2 pain    A/P: 63M with left upper extremity eschar, surrounding fluctuance  If concerned for joint involvement would consider imaging via ultrasound vs. MRI  Will otherwise defer to general surgery for recommendations regarding management of eschar/possible underlying abscess  Discussed case with senior resident HPI: 63M with multiple medical problems evaluated for concern of fluctuance underlying left upper extremity eschar. Pt on ventilator with trach and peg, nonverbal and minimally responsive to stimuli.     PE: Nonverbal, minimal response to painful or verbal stimuli. Not following commands. On ventilator with trach and with peg  ~3cm eschar just distal to antecubital fossa on left forearm. Minimal surrounding erythema. No streaking. No drainage  +palpable fluctuance. No palpable warmth  Radial pulses 2+, fingers warm and well perfused, cap refill brisk  Unable to assess strength or sensation. No movement of bilateral upper extremities visualized, no withdrawal of extremities 2/2 pain    A/P: 63M with left forearm eschar, surrounding fluctuance  If concerned for joint involvement would consider imaging via ultrasound vs. MRI  Will otherwise defer to general surgery for recommendations regarding management of eschar/possible underlying abscess  Consider IV antibiotics, ice, elevation  Discussed case with senior resident

## 2018-04-26 NOTE — PROGRESS NOTE ADULT - SUBJECTIVE AND OBJECTIVE BOX
PROGRESS NOTE    CC: Septic shock,   Overnight Events:  Interval History: More awake  ROS:    OBJECTIVE  Vitals:  T(C): 36.4 (04-26-18 @ 05:00), Max: 36.8 (04-25-18 @ 15:38)  HR: 95 (04-26-18 @ 05:47) (77 - 98)  BP: 100/77 (04-26-18 @ 04:58) (81/66 - 100/77)  RR: 12 (04-26-18 @ 05:47) (11 - 26)  SpO2: 100% (04-26-18 @ 05:47) (100% - 100%)  Wt(kg): --  Mode: AC/ CMV (Assist Control/ Continuous Mandatory Ventilation)  RR (machine): 10  TV (machine): 500  FiO2: 40  PEEP: 5  ITime: 1  MAP: 7  PIP: 18    I/O:  I&O's Summary    25 Apr 2018 07:01  -  26 Apr 2018 07:00  --------------------------------------------------------  IN: 581 mL / OUT: 0 mL / NET: 581 mL        PHYSICAL EXAM:  Appearance: NAD, no resp accessory muscle use. Awake and more alert than last exam. Trach intact.  HEENT: PERRL. Opening eyes spontaneously.  Conjunctiva clear b/l. Moist oral mucosa.  Cardiovascular: Irregularly irregular, S1, S2 with 2/6 systolic murmur along lower left sternal border.  Respiratory: Lungs CTAB.   Gastrointestinal:  Soft, nontender. Non-distended. Non-rigid. PEG in place no drainage noted. 	  Extremities: 1+ pitting edema L>R in feet and ankles. No erythema b/l. Cool extremities. L side eschar on forearm.    Vascular: DP diminished though present.  Neurologic:  Alert and awake. Following commands. Making eye contact- opening eyes spontaneously and to verbal command. Moving b/l lower extremities, intact R side , elbow flex/ext, L side contracted.   	  LABS:                        7.4    14.4  )-----------( 297      ( 26 Apr 2018 06:06 )             25.0     04-26    132<L>  |  89<L>  |  61<H>  ----------------------------<  240<H>  4.4   |  21<L>  |  3.74<H>    Ca    8.8      26 Apr 2018 06:05  Phos  6.7     04-26  Mg     2.0     04-26      PT/INR - ( 26 Apr 2018 06:08 )   PT: 14.5 sec;   INR: 1.30          PTT - ( 25 Apr 2018 23:09 )  PTT:81.7 sec      RADIOLOGY & ADDITIONAL TESTS:  Reviewed .    MEDICATIONS  (STANDING):  aspirin  chewable 81 milliGRAM(s) Oral daily  atorvastatin 80 milliGRAM(s) Oral at bedtime  cefTRIAXone Injectable. 2000 milliGRAM(s) IV Push every 24 hours  chlorhexidine 0.12% Liquid 15 milliLiter(s) Swish and Spit two times a day  chlorhexidine 2% Cloths 1 Application(s) Topical daily  dextrose 10%. 1000 milliLiter(s) (70 mL/Hr) IV Continuous <Continuous>  dextrose 5%. 1000 milliLiter(s) (50 mL/Hr) IV Continuous <Continuous>  dextrose 50% Injectable 12.5 Gram(s) IV Push once  dextrose 50% Injectable 25 Gram(s) IV Push once  dextrose 50% Injectable 25 Gram(s) IV Push once  ergocalciferol Drops 6000 Unit(s) Oral daily  escitalopram 5 milliGRAM(s) Oral daily  fludroCORTISONE 0.1 milliGRAM(s) Oral daily  folic acid 1 milliGRAM(s) Oral daily  heparin  Infusion 1600 Unit(s)/Hr (16 mL/Hr) IV Continuous <Continuous>  insulin glargine Injectable (LANTUS) 21 Unit(s) SubCutaneous at bedtime  insulin regular  human corrective regimen sliding scale   SubCutaneous every 6 hours  insulin regular  human recombinant 7 Unit(s) SubCutaneous every 6 hours  levETIRAcetam  Solution 250 milliGRAM(s) Oral <User Schedule>  levETIRAcetam  Solution 500 milliGRAM(s) Oral every 24 hours  midodrine 15 milliGRAM(s) Oral every 8 hours  modafinil 100 milliGRAM(s) Oral <User Schedule>  multivitamin 1 Tablet(s) Oral daily  pantoprazole   Suspension 40 milliGRAM(s) Oral daily  predniSONE   Tablet 6 milliGRAM(s) Oral every 12 hours  psyllium Powder 1 Packet(s) Oral daily  thiamine 100 milliGRAM(s) Oral daily    MEDICATIONS  (PRN):  acetaminophen    Suspension 650 milliGRAM(s) Oral every 6 hours PRN For Temp greater than 38 C (100.4 F)  acetaminophen    Suspension. 650 milliGRAM(s) Oral every 6 hours PRN Moderate Pain (4 - 6)  dextrose Gel 1 Dose(s) Oral once PRN Blood Glucose LESS THAN 70 milliGRAM(s)/deciliter  glucagon  Injectable 1 milliGRAM(s) IntraMuscular once PRN Glucose LESS THAN 70 milligrams/deciliter PROGRESS NOTE    CC: Septic shock,   Overnight Events:  Interval History: More awake, following commands. Responding appropriately to questions with yes/no. Reports no complaints.  ROS: As above.    OBJECTIVE  Vitals:  T(C): 36.4 (04-26-18 @ 05:00), Max: 36.8 (04-25-18 @ 15:38)  HR: 95 (04-26-18 @ 05:47) (77 - 98)  BP: 100/77 (04-26-18 @ 04:58) (81/66 - 100/77)  RR: 12 (04-26-18 @ 05:47) (11 - 26)  SpO2: 100% (04-26-18 @ 05:47) (100% - 100%)  Wt(kg): --  Mode: AC/ CMV (Assist Control/ Continuous Mandatory Ventilation)  RR (machine): 10  TV (machine): 500  FiO2: 40  PEEP: 5  ITime: 1  MAP: 7  PIP: 18    I/O:  I&O's Summary    25 Apr 2018 07:01  -  26 Apr 2018 07:00  --------------------------------------------------------  IN: 581 mL / OUT: 0 mL / NET: 581 mL        PHYSICAL EXAM:  Appearance: NAD, no resp accessory muscle use. Awake and more alert than last exam. Trach intact.  HEENT: PERRL. Opening eyes spontaneously.  Conjunctiva clear b/l. Moist oral mucosa.  Cardiovascular: Irregularly irregular, S1, S2 with 2/6 systolic murmur along lower left sternal border.  Respiratory: Lungs CTAB.   Gastrointestinal:  Soft, nontender. Non-distended. Non-rigid. PEG in place no drainage noted. 	  Extremities: 1+ pitting edema L>R in feet and ankles. No erythema b/l. Cool extremities. L side eschar on forearm.    Vascular: DP diminished though present.  Neurologic:  Alert and awake. Following commands. Making eye contact- opening eyes spontaneously and to verbal command. Moving b/l lower extremities, intact R side , elbow flex/ext, L side contracted.   	  LABS:                        7.4    14.4  )-----------( 297      ( 26 Apr 2018 06:06 )             25.0     04-26    132<L>  |  89<L>  |  61<H>  ----------------------------<  240<H>  4.4   |  21<L>  |  3.74<H>    Ca    8.8      26 Apr 2018 06:05  Phos  6.7     04-26  Mg     2.0     04-26      PT/INR - ( 26 Apr 2018 06:08 )   PT: 14.5 sec;   INR: 1.30          PTT - ( 25 Apr 2018 23:09 )  PTT:81.7 sec      RADIOLOGY & ADDITIONAL TESTS:  Reviewed .    MEDICATIONS  (STANDING):  aspirin  chewable 81 milliGRAM(s) Oral daily  atorvastatin 80 milliGRAM(s) Oral at bedtime  cefTRIAXone Injectable. 2000 milliGRAM(s) IV Push every 24 hours  chlorhexidine 0.12% Liquid 15 milliLiter(s) Swish and Spit two times a day  chlorhexidine 2% Cloths 1 Application(s) Topical daily  dextrose 10%. 1000 milliLiter(s) (70 mL/Hr) IV Continuous <Continuous>  dextrose 5%. 1000 milliLiter(s) (50 mL/Hr) IV Continuous <Continuous>  dextrose 50% Injectable 12.5 Gram(s) IV Push once  dextrose 50% Injectable 25 Gram(s) IV Push once  dextrose 50% Injectable 25 Gram(s) IV Push once  ergocalciferol Drops 6000 Unit(s) Oral daily  escitalopram 5 milliGRAM(s) Oral daily  fludroCORTISONE 0.1 milliGRAM(s) Oral daily  folic acid 1 milliGRAM(s) Oral daily  heparin  Infusion 1600 Unit(s)/Hr (16 mL/Hr) IV Continuous <Continuous>  insulin glargine Injectable (LANTUS) 21 Unit(s) SubCutaneous at bedtime  insulin regular  human corrective regimen sliding scale   SubCutaneous every 6 hours  insulin regular  human recombinant 7 Unit(s) SubCutaneous every 6 hours  levETIRAcetam  Solution 250 milliGRAM(s) Oral <User Schedule>  levETIRAcetam  Solution 500 milliGRAM(s) Oral every 24 hours  midodrine 15 milliGRAM(s) Oral every 8 hours  modafinil 100 milliGRAM(s) Oral <User Schedule>  multivitamin 1 Tablet(s) Oral daily  pantoprazole   Suspension 40 milliGRAM(s) Oral daily  predniSONE   Tablet 6 milliGRAM(s) Oral every 12 hours  psyllium Powder 1 Packet(s) Oral daily  thiamine 100 milliGRAM(s) Oral daily    MEDICATIONS  (PRN):  acetaminophen    Suspension 650 milliGRAM(s) Oral every 6 hours PRN For Temp greater than 38 C (100.4 F)  acetaminophen    Suspension. 650 milliGRAM(s) Oral every 6 hours PRN Moderate Pain (4 - 6)  dextrose Gel 1 Dose(s) Oral once PRN Blood Glucose LESS THAN 70 milliGRAM(s)/deciliter  glucagon  Injectable 1 milliGRAM(s) IntraMuscular once PRN Glucose LESS THAN 70 milligrams/deciliter PROGRESS NOTE    CC: Septic shock,   Overnight Events: Improving hypoglycemia. Otherwise ELSIE.  Interval History: More awake, following commands. Responding appropriately to questions with yes/no. Reports no complaints.  ROS: As above.    OBJECTIVE  Vitals:  T(C): 36.4 (04-26-18 @ 05:00), Max: 36.8 (04-25-18 @ 15:38)  HR: 95 (04-26-18 @ 05:47) (77 - 98)  BP: 100/77 (04-26-18 @ 04:58) (81/66 - 100/77)  RR: 12 (04-26-18 @ 05:47) (11 - 26)  SpO2: 100% (04-26-18 @ 05:47) (100% - 100%)  Wt(kg): --  Mode: AC/ CMV (Assist Control/ Continuous Mandatory Ventilation)  RR (machine): 10  TV (machine): 500  FiO2: 40  PEEP: 5  ITime: 1  MAP: 7  PIP: 18    I/O:  I&O's Summary    25 Apr 2018 07:01  -  26 Apr 2018 07:00  --------------------------------------------------------  IN: 581 mL / OUT: 0 mL / NET: 581 mL        PHYSICAL EXAM:  Appearance: NAD, no resp accessory muscle use. Awake and more alert than last exam. Trach intact.  HEENT: PERRL. Opening eyes spontaneously.  Conjunctiva clear b/l. Moist oral mucosa.  Chest: R side permacath with clean bandage, no drainage or purulence noted.   Cardiovascular: Irregularly irregular, S1, S2 with 2/6 systolic murmur along lower left sternal border.  Respiratory: Lungs with decreased breath sounds at base without wheezing or crackles noted. Clear otherwise.  Gastrointestinal:  Soft, nontender. Non-distended. Non-rigid. PEG in place no drainage noted. 	  Extremities: 1+ pitting edema L>R in feet and ankles. No erythema b/l. Cool extremities. L side eschar on forearm.    Vascular: DP diminished though present.  Neurologic:  Alert and awake. Following commands. Making eye contact- opening eyes spontaneously and to verbal command. Moving b/l lower extremities, intact R side , elbow flex/ext, L side contracted.   	  LABS:                        7.4    14.4  )-----------( 297      ( 26 Apr 2018 06:06 )             25.0     04-26    132<L>  |  89<L>  |  61<H>  ----------------------------<  240<H>  4.4   |  21<L>  |  3.74<H>    Ca    8.8      26 Apr 2018 06:05  Phos  6.7     04-26  Mg     2.0     04-26      PT/INR - ( 26 Apr 2018 06:08 )   PT: 14.5 sec;   INR: 1.30          PTT - ( 25 Apr 2018 23:09 )  PTT:81.7 sec      RADIOLOGY & ADDITIONAL TESTS:  Reviewed .    MEDICATIONS  (STANDING):  aspirin  chewable 81 milliGRAM(s) Oral daily  atorvastatin 80 milliGRAM(s) Oral at bedtime  cefTRIAXone Injectable. 2000 milliGRAM(s) IV Push every 24 hours  chlorhexidine 0.12% Liquid 15 milliLiter(s) Swish and Spit two times a day  chlorhexidine 2% Cloths 1 Application(s) Topical daily  dextrose 10%. 1000 milliLiter(s) (70 mL/Hr) IV Continuous <Continuous>  dextrose 5%. 1000 milliLiter(s) (50 mL/Hr) IV Continuous <Continuous>  dextrose 50% Injectable 12.5 Gram(s) IV Push once  dextrose 50% Injectable 25 Gram(s) IV Push once  dextrose 50% Injectable 25 Gram(s) IV Push once  ergocalciferol Drops 6000 Unit(s) Oral daily  escitalopram 5 milliGRAM(s) Oral daily  fludroCORTISONE 0.1 milliGRAM(s) Oral daily  folic acid 1 milliGRAM(s) Oral daily  heparin  Infusion 1600 Unit(s)/Hr (16 mL/Hr) IV Continuous <Continuous>  insulin glargine Injectable (LANTUS) 21 Unit(s) SubCutaneous at bedtime  insulin regular  human corrective regimen sliding scale   SubCutaneous every 6 hours  insulin regular  human recombinant 7 Unit(s) SubCutaneous every 6 hours  levETIRAcetam  Solution 250 milliGRAM(s) Oral <User Schedule>  levETIRAcetam  Solution 500 milliGRAM(s) Oral every 24 hours  midodrine 15 milliGRAM(s) Oral every 8 hours  modafinil 100 milliGRAM(s) Oral <User Schedule>  multivitamin 1 Tablet(s) Oral daily  pantoprazole   Suspension 40 milliGRAM(s) Oral daily  predniSONE   Tablet 6 milliGRAM(s) Oral every 12 hours  psyllium Powder 1 Packet(s) Oral daily  thiamine 100 milliGRAM(s) Oral daily    MEDICATIONS  (PRN):  acetaminophen    Suspension 650 milliGRAM(s) Oral every 6 hours PRN For Temp greater than 38 C (100.4 F)  acetaminophen    Suspension. 650 milliGRAM(s) Oral every 6 hours PRN Moderate Pain (4 - 6)  dextrose Gel 1 Dose(s) Oral once PRN Blood Glucose LESS THAN 70 milliGRAM(s)/deciliter  glucagon  Injectable 1 milliGRAM(s) IntraMuscular once PRN Glucose LESS THAN 70 milligrams/deciliter

## 2018-04-26 NOTE — PROGRESS NOTE ADULT - SUBJECTIVE AND OBJECTIVE BOX
Patient seen and examined at bedside. Patient is a 63 year old male with a history of HFrEF 10-15% due to ischemic cardiomyopathy, s/p AICD, ?atrial fibrillation, hypertension, diabetes mellitus type 2, gout, and CKD for whom nephrology was called for GILMER from AT requiring hemodialysis. Patient was moved to the step down unit. Patient was last dialyzed 4/24 with UF of 1.5 kg.     acetaminophen    Suspension 650 milliGRAM(s) every 6 hours PRN  acetaminophen    Suspension. 650 milliGRAM(s) every 6 hours PRN  aspirin  chewable 81 milliGRAM(s) daily  atorvastatin 80 milliGRAM(s) at bedtime  cefTRIAXone Injectable. 2000 milliGRAM(s) every 24 hours  chlorhexidine 0.12% Liquid 15 milliLiter(s) two times a day  chlorhexidine 2% Cloths 1 Application(s) daily  dextrose 5%. 1000 milliLiter(s) <Continuous>  dextrose 50% Injectable 12.5 Gram(s) once  dextrose 50% Injectable 25 Gram(s) once  dextrose 50% Injectable 25 Gram(s) once  dextrose Gel 1 Dose(s) once PRN  ergocalciferol Drops 6000 Unit(s) daily  escitalopram 5 milliGRAM(s) daily  fludroCORTISONE 0.1 milliGRAM(s) daily  folic acid 1 milliGRAM(s) daily  glucagon  Injectable 1 milliGRAM(s) once PRN  heparin  Infusion 1600 Unit(s)/Hr <Continuous>  insulin glargine Injectable (LANTUS) 21 Unit(s) at bedtime  insulin regular  human corrective regimen sliding scale   every 6 hours  insulin regular  human recombinant 7 Unit(s) every 6 hours  levETIRAcetam  Solution 250 milliGRAM(s) <User Schedule>  levETIRAcetam  Solution 500 milliGRAM(s) every 24 hours  midodrine 15 milliGRAM(s) every 8 hours  modafinil 100 milliGRAM(s) <User Schedule>  multivitamin 1 Tablet(s) daily  pantoprazole   Suspension 40 milliGRAM(s) daily  predniSONE   Tablet 6 milliGRAM(s) every 12 hours  psyllium Powder 1 Packet(s) daily  thiamine 100 milliGRAM(s) daily    Allergies    No Known Allergies    Intolerances    T(C): , Max: 36.8 (04-25-18 @ 15:38)  T(F): , Max: 98.3 (04-25-18 @ 15:38)  HR: 105 (04-26-18 @ 09:35)  BP: 97/71 (04-26-18 @ 09:35)  BP(mean): 89 (04-26-18 @ 08:10)  RR: 19 (04-26-18 @ 09:35)  SpO2: 100% (04-26-18 @ 09:35)    04-25 @ 07:01  -  04-26 @ 07:00  --------------------------------------------------------  IN:    dextrose 10%: 350 mL    Enteral Tube Flush: 180 mL    heparin Infusion: 51 mL  Total IN: 581 mL    OUT:  Total OUT: 0 mL    Total NET: 581 mL    LABS:                        7.4    14.4  )-----------( 297      ( 26 Apr 2018 06:06 )             25.0     04-26    132<L>  |  89<L>  |  61<H>  ----------------------------<  240<H>  4.4   |  21<L>  |  3.74<H>    Ca    8.8      26 Apr 2018 06:05  Phos  6.7     04-26  Mg     2.0     04-26    PT/INR - ( 26 Apr 2018 06:08 )   PT: 14.5 sec;   INR: 1.30       PTT - ( 25 Apr 2018 23:09 )  PTT:81.7 sec

## 2018-04-26 NOTE — PROGRESS NOTE ADULT - ATTENDING COMMENTS
Patient seen and examined with house-staff during bedside rounds.  Resident note read, including vitals, physical findings, laboratory data, and radiological reports.   Revisions included below.  Direct personal management at bed side and extensive interpretation of the data.  Plan was outlined and discussed in details with the housestaff.  Decision making of high complexity  Action taken for acute disease activity to reflect the level of care provided:  - medication reconciliation  - review laboratory data  - on PSV  - vent management  - DC albumin  tolerated HD  - discussed with ortho  - n antibiotic

## 2018-04-26 NOTE — PROGRESS NOTE ADULT - PROBLEM SELECTOR PLAN 1
Course has been complicated by septic shock as well as component of cardiogenic shock. Patient required pressors and inotropes for which he has been weaned off. On midodrine 15 mg TID. Has also been on albumin with dialysis and pending weaning for further dispo.  - c/w Midodrine 15 mg q8h to maintain SBP>65  - c/w fludrocortisone 0.1mg qd and prednisone 6 mg BID for oral regimen    #Adrenal Insufficiency  BP improved immediately after hydrocortisone and able to wean off levophed  - c/w fludrocortisone and prednisone for PO regimen Course has been complicated by septic shock as well as component of cardiogenic shock. Patient required pressors and inotropes for which he has been weaned off. On midodrine 15 mg TID. Has also been on albumin with dialysis and pending weaning for further dispo.  - c/w Midodrine 15 mg q8h to maintain SBP>65  - c/w fludrocortisone 0.1mg qd and prednisone 6 mg BID for oral regimen  - Attempt to undergo HD off albumin today    #Adrenal Insufficiency  BP improved immediately after hydrocortisone and able to wean off levophed  - c/w fludrocortisone and prednisone for PO regimen

## 2018-04-26 NOTE — PROGRESS NOTE ADULT - SUBJECTIVE AND OBJECTIVE BOX
Mental status improved - awake for most of the day. Nods yes or no to answers, able to look left and right and squeeze fingers with the right hand.     Left arm rigid, extended elbow, flexed fingers and wrist. b/l legs unable to move. diffuse sarcopenia    Eschar on left forearm

## 2018-04-27 LAB
ALBUMIN SERPL ELPH-MCNC: 3.2 G/DL — LOW (ref 3.3–5)
ALP SERPL-CCNC: 327 U/L — HIGH (ref 40–120)
ALT FLD-CCNC: 61 U/L — HIGH (ref 10–45)
ANION GAP SERPL CALC-SCNC: 18 MMOL/L — HIGH (ref 5–17)
ANION GAP SERPL CALC-SCNC: 20 MMOL/L — HIGH (ref 5–17)
ANION GAP SERPL CALC-SCNC: 23 MMOL/L — HIGH (ref 5–17)
APTT BLD: 37.8 SEC — HIGH (ref 27.5–37.4)
APTT BLD: 77 SEC — HIGH (ref 27.5–37.4)
AST SERPL-CCNC: 29 U/L — SIGNIFICANT CHANGE UP (ref 10–40)
BASE EXCESS BLDA CALC-SCNC: -1.4 MMOL/L — SIGNIFICANT CHANGE UP (ref -2–3)
BASOPHILS NFR BLD AUTO: 0.1 % — SIGNIFICANT CHANGE UP (ref 0–2)
BILIRUB SERPL-MCNC: 0.8 MG/DL — SIGNIFICANT CHANGE UP (ref 0.2–1.2)
BUN SERPL-MCNC: 41 MG/DL — HIGH (ref 7–23)
BUN SERPL-MCNC: 46 MG/DL — HIGH (ref 7–23)
BUN SERPL-MCNC: 47 MG/DL — HIGH (ref 7–23)
CALCIUM SERPL-MCNC: 8.6 MG/DL — SIGNIFICANT CHANGE UP (ref 8.4–10.5)
CALCIUM SERPL-MCNC: 9.3 MG/DL — SIGNIFICANT CHANGE UP (ref 8.4–10.5)
CALCIUM SERPL-MCNC: 9.3 MG/DL — SIGNIFICANT CHANGE UP (ref 8.4–10.5)
CHLORIDE SERPL-SCNC: 92 MMOL/L — LOW (ref 96–108)
CHLORIDE SERPL-SCNC: 94 MMOL/L — LOW (ref 96–108)
CHLORIDE SERPL-SCNC: 96 MMOL/L — SIGNIFICANT CHANGE UP (ref 96–108)
CO2 SERPL-SCNC: 23 MMOL/L — SIGNIFICANT CHANGE UP (ref 22–31)
CREAT SERPL-MCNC: 3.04 MG/DL — HIGH (ref 0.5–1.3)
CREAT SERPL-MCNC: 3.36 MG/DL — HIGH (ref 0.5–1.3)
CREAT SERPL-MCNC: 3.55 MG/DL — HIGH (ref 0.5–1.3)
EOSINOPHIL NFR BLD AUTO: 0 % — SIGNIFICANT CHANGE UP (ref 0–6)
GAS PNL BLDA: SIGNIFICANT CHANGE UP
GAS PNL BLDV: SIGNIFICANT CHANGE UP
GLUCOSE BLDC GLUCOMTR-MCNC: 125 MG/DL — HIGH (ref 70–99)
GLUCOSE BLDC GLUCOMTR-MCNC: 198 MG/DL — HIGH (ref 70–99)
GLUCOSE BLDC GLUCOMTR-MCNC: 70 MG/DL — SIGNIFICANT CHANGE UP (ref 70–99)
GLUCOSE BLDC GLUCOMTR-MCNC: 80 MG/DL — SIGNIFICANT CHANGE UP (ref 70–99)
GLUCOSE SERPL-MCNC: 126 MG/DL — HIGH (ref 70–99)
GLUCOSE SERPL-MCNC: 135 MG/DL — HIGH (ref 70–99)
GLUCOSE SERPL-MCNC: 183 MG/DL — HIGH (ref 70–99)
HCO3 BLDA-SCNC: 20 MMOL/L — LOW (ref 21–28)
HCT VFR BLD CALC: 22.5 % — LOW (ref 39–50)
HCT VFR BLD CALC: 34.1 % — LOW (ref 39–50)
HCT VFR BLD CALC: 36.8 % — LOW (ref 39–50)
HGB BLD-MCNC: 10.7 G/DL — LOW (ref 13–17)
HGB BLD-MCNC: 11.4 G/DL — LOW (ref 13–17)
HGB BLD-MCNC: 6.7 G/DL — CRITICAL LOW (ref 13–17)
INR BLD: 1.47 — HIGH (ref 0.88–1.16)
INR BLD: 1.61 — HIGH (ref 0.88–1.16)
LACTATE SERPL-SCNC: 4.1 MMOL/L — CRITICAL HIGH (ref 0.5–2)
LACTATE SERPL-SCNC: 4.3 MMOL/L — CRITICAL HIGH (ref 0.5–2)
LYMPHOCYTES # BLD AUTO: 2 % — LOW (ref 13–44)
LYMPHOCYTES # BLD AUTO: 4.3 % — LOW (ref 13–44)
MAGNESIUM SERPL-MCNC: 1.9 MG/DL — SIGNIFICANT CHANGE UP (ref 1.6–2.6)
MAGNESIUM SERPL-MCNC: 2 MG/DL — SIGNIFICANT CHANGE UP (ref 1.6–2.6)
MCHC RBC-ENTMCNC: 28.8 PG — SIGNIFICANT CHANGE UP (ref 27–34)
MCHC RBC-ENTMCNC: 28.8 PG — SIGNIFICANT CHANGE UP (ref 27–34)
MCHC RBC-ENTMCNC: 28.9 PG — SIGNIFICANT CHANGE UP (ref 27–34)
MCHC RBC-ENTMCNC: 29.8 G/DL — LOW (ref 32–36)
MCHC RBC-ENTMCNC: 31 G/DL — LOW (ref 32–36)
MCHC RBC-ENTMCNC: 31.4 G/DL — LOW (ref 32–36)
MCV RBC AUTO: 92.2 FL — SIGNIFICANT CHANGE UP (ref 80–100)
MCV RBC AUTO: 92.9 FL — SIGNIFICANT CHANGE UP (ref 80–100)
MCV RBC AUTO: 96.6 FL — SIGNIFICANT CHANGE UP (ref 80–100)
MONOCYTES NFR BLD AUTO: 4.6 % — SIGNIFICANT CHANGE UP (ref 2–14)
MONOCYTES NFR BLD AUTO: 6 % — SIGNIFICANT CHANGE UP (ref 2–14)
NEUTROPHILS NFR BLD AUTO: 85 % — HIGH (ref 43–77)
NEUTROPHILS NFR BLD AUTO: 91 % — HIGH (ref 43–77)
PCO2 BLDA: 26 MMHG — LOW (ref 35–48)
PH BLDA: 7.51 — HIGH (ref 7.35–7.45)
PHOSPHATE SERPL-MCNC: 6.1 MG/DL — HIGH (ref 2.5–4.5)
PLATELET # BLD AUTO: 228 K/UL — SIGNIFICANT CHANGE UP (ref 150–400)
PLATELET # BLD AUTO: 362 K/UL — SIGNIFICANT CHANGE UP (ref 150–400)
PLATELET # BLD AUTO: 384 K/UL — SIGNIFICANT CHANGE UP (ref 150–400)
PO2 BLDA: 306 MMHG — HIGH (ref 83–108)
POTASSIUM SERPL-MCNC: 4.3 MMOL/L — SIGNIFICANT CHANGE UP (ref 3.5–5.3)
POTASSIUM SERPL-MCNC: 4.3 MMOL/L — SIGNIFICANT CHANGE UP (ref 3.5–5.3)
POTASSIUM SERPL-MCNC: 4.4 MMOL/L — SIGNIFICANT CHANGE UP (ref 3.5–5.3)
POTASSIUM SERPL-SCNC: 4.3 MMOL/L — SIGNIFICANT CHANGE UP (ref 3.5–5.3)
POTASSIUM SERPL-SCNC: 4.3 MMOL/L — SIGNIFICANT CHANGE UP (ref 3.5–5.3)
POTASSIUM SERPL-SCNC: 4.4 MMOL/L — SIGNIFICANT CHANGE UP (ref 3.5–5.3)
PROT SERPL-MCNC: 7.4 G/DL — SIGNIFICANT CHANGE UP (ref 6–8.3)
PROTHROM AB SERPL-ACNC: 16.4 SEC — HIGH (ref 9.8–12.7)
PROTHROM AB SERPL-ACNC: 18.1 SEC — HIGH (ref 9.8–12.7)
RBC # BLD: 2.33 M/UL — LOW (ref 4.2–5.8)
RBC # BLD: 3.7 M/UL — LOW (ref 4.2–5.8)
RBC # BLD: 3.96 M/UL — LOW (ref 4.2–5.8)
RBC # FLD: 18.4 % — HIGH (ref 10.3–16.9)
RBC # FLD: 18.5 % — HIGH (ref 10.3–16.9)
RBC # FLD: 18.5 % — HIGH (ref 10.3–16.9)
SAO2 % BLDA: 100 % — SIGNIFICANT CHANGE UP (ref 95–100)
SODIUM SERPL-SCNC: 135 MMOL/L — SIGNIFICANT CHANGE UP (ref 135–145)
SODIUM SERPL-SCNC: 138 MMOL/L — SIGNIFICANT CHANGE UP (ref 135–145)
SODIUM SERPL-SCNC: 139 MMOL/L — SIGNIFICANT CHANGE UP (ref 135–145)
WBC # BLD: 14.1 K/UL — HIGH (ref 3.8–10.5)
WBC # BLD: 23.7 K/UL — HIGH (ref 3.8–10.5)
WBC # BLD: 41.3 K/UL — CRITICAL HIGH (ref 3.8–10.5)
WBC # FLD AUTO: 14.1 K/UL — HIGH (ref 3.8–10.5)
WBC # FLD AUTO: 23.7 K/UL — HIGH (ref 3.8–10.5)
WBC # FLD AUTO: 41.3 K/UL — CRITICAL HIGH (ref 3.8–10.5)

## 2018-04-27 PROCEDURE — 74018 RADEX ABDOMEN 1 VIEW: CPT | Mod: 26

## 2018-04-27 PROCEDURE — 99233 SBSQ HOSP IP/OBS HIGH 50: CPT

## 2018-04-27 PROCEDURE — 71045 X-RAY EXAM CHEST 1 VIEW: CPT | Mod: 26,76

## 2018-04-27 PROCEDURE — 93010 ELECTROCARDIOGRAM REPORT: CPT

## 2018-04-27 RX ORDER — SODIUM CHLORIDE 9 MG/ML
250 INJECTION INTRAMUSCULAR; INTRAVENOUS; SUBCUTANEOUS ONCE
Qty: 0 | Refills: 0 | Status: COMPLETED | OUTPATIENT
Start: 2018-04-27 | End: 2018-04-27

## 2018-04-27 RX ORDER — QUETIAPINE FUMARATE 200 MG/1
12.5 TABLET, FILM COATED ORAL ONCE
Qty: 0 | Refills: 0 | Status: COMPLETED | OUTPATIENT
Start: 2018-04-27 | End: 2018-04-27

## 2018-04-27 RX ORDER — MODAFINIL 200 MG/1
100 TABLET ORAL
Qty: 0 | Refills: 0 | Status: DISCONTINUED | OUTPATIENT
Start: 2018-04-28 | End: 2018-05-04

## 2018-04-27 RX ORDER — PIPERACILLIN AND TAZOBACTAM 4; .5 G/20ML; G/20ML
2.25 INJECTION, POWDER, LYOPHILIZED, FOR SOLUTION INTRAVENOUS EVERY 8 HOURS
Qty: 0 | Refills: 0 | Status: DISCONTINUED | OUTPATIENT
Start: 2018-04-27 | End: 2018-05-07

## 2018-04-27 RX ORDER — VANCOMYCIN HCL 1 G
1000 VIAL (EA) INTRAVENOUS ONCE
Qty: 0 | Refills: 0 | Status: DISCONTINUED | OUTPATIENT
Start: 2018-04-27 | End: 2018-04-27

## 2018-04-27 RX ORDER — INSULIN GLARGINE 100 [IU]/ML
10 INJECTION, SOLUTION SUBCUTANEOUS AT BEDTIME
Qty: 0 | Refills: 0 | Status: DISCONTINUED | OUTPATIENT
Start: 2018-04-27 | End: 2018-04-30

## 2018-04-27 RX ORDER — DEXTROSE 50 % IN WATER 50 %
25 SYRINGE (ML) INTRAVENOUS ONCE
Qty: 0 | Refills: 0 | Status: COMPLETED | OUTPATIENT
Start: 2018-04-27 | End: 2018-04-27

## 2018-04-27 RX ORDER — ACETAMINOPHEN 500 MG
650 TABLET ORAL EVERY 6 HOURS
Qty: 0 | Refills: 0 | Status: DISCONTINUED | OUTPATIENT
Start: 2018-04-27 | End: 2018-05-22

## 2018-04-27 RX ORDER — DEXTROSE 10 % IN WATER 10 %
1000 INTRAVENOUS SOLUTION INTRAVENOUS
Qty: 0 | Refills: 0 | Status: DISCONTINUED | OUTPATIENT
Start: 2018-04-27 | End: 2018-04-29

## 2018-04-27 RX ORDER — PIPERACILLIN AND TAZOBACTAM 4; .5 G/20ML; G/20ML
2.25 INJECTION, POWDER, LYOPHILIZED, FOR SOLUTION INTRAVENOUS EVERY 8 HOURS
Qty: 0 | Refills: 0 | Status: DISCONTINUED | OUTPATIENT
Start: 2018-04-27 | End: 2018-04-27

## 2018-04-27 RX ORDER — PIPERACILLIN AND TAZOBACTAM 4; .5 G/20ML; G/20ML
INJECTION, POWDER, LYOPHILIZED, FOR SOLUTION INTRAVENOUS
Qty: 0 | Refills: 0 | Status: DISCONTINUED | OUTPATIENT
Start: 2018-04-27 | End: 2018-04-27

## 2018-04-27 RX ORDER — HYDROCORTISONE 20 MG
50 TABLET ORAL ONCE
Qty: 0 | Refills: 0 | Status: DISCONTINUED | OUTPATIENT
Start: 2018-04-27 | End: 2018-04-27

## 2018-04-27 RX ORDER — HYDROCORTISONE 20 MG
50 TABLET ORAL EVERY 6 HOURS
Qty: 0 | Refills: 0 | Status: DISCONTINUED | OUTPATIENT
Start: 2018-04-27 | End: 2018-04-30

## 2018-04-27 RX ORDER — PIPERACILLIN AND TAZOBACTAM 4; .5 G/20ML; G/20ML
2.25 INJECTION, POWDER, LYOPHILIZED, FOR SOLUTION INTRAVENOUS ONCE
Qty: 0 | Refills: 0 | Status: DISCONTINUED | OUTPATIENT
Start: 2018-04-27 | End: 2018-04-27

## 2018-04-27 RX ORDER — NOREPINEPHRINE BITARTRATE/D5W 8 MG/250ML
0.01 PLASTIC BAG, INJECTION (ML) INTRAVENOUS
Qty: 8 | Refills: 0 | Status: DISCONTINUED | OUTPATIENT
Start: 2018-04-27 | End: 2018-04-29

## 2018-04-27 RX ORDER — SODIUM CHLORIDE 9 MG/ML
500 INJECTION INTRAMUSCULAR; INTRAVENOUS; SUBCUTANEOUS ONCE
Qty: 0 | Refills: 0 | Status: COMPLETED | OUTPATIENT
Start: 2018-04-27 | End: 2018-04-27

## 2018-04-27 RX ORDER — ONDANSETRON 8 MG/1
4 TABLET, FILM COATED ORAL ONCE
Qty: 0 | Refills: 0 | Status: COMPLETED | OUTPATIENT
Start: 2018-04-27 | End: 2018-04-27

## 2018-04-27 RX ORDER — WARFARIN SODIUM 2.5 MG/1
5 TABLET ORAL AT BEDTIME
Qty: 0 | Refills: 0 | Status: DISCONTINUED | OUTPATIENT
Start: 2018-04-27 | End: 2018-04-27

## 2018-04-27 RX ORDER — VANCOMYCIN HCL 1 G
1250 VIAL (EA) INTRAVENOUS ONCE
Qty: 0 | Refills: 0 | Status: COMPLETED | OUTPATIENT
Start: 2018-04-27 | End: 2018-04-27

## 2018-04-27 RX ADMIN — PANTOPRAZOLE SODIUM 40 MILLIGRAM(S): 20 TABLET, DELAYED RELEASE ORAL at 11:32

## 2018-04-27 RX ADMIN — ESCITALOPRAM OXALATE 5 MILLIGRAM(S): 10 TABLET, FILM COATED ORAL at 11:29

## 2018-04-27 RX ADMIN — SODIUM CHLORIDE 2000 MILLILITER(S): 9 INJECTION INTRAMUSCULAR; INTRAVENOUS; SUBCUTANEOUS at 19:30

## 2018-04-27 RX ADMIN — Medication 1.4 MICROGRAM(S)/KG/MIN: at 18:17

## 2018-04-27 RX ADMIN — PIPERACILLIN AND TAZOBACTAM 200 GRAM(S): 4; .5 INJECTION, POWDER, LYOPHILIZED, FOR SOLUTION INTRAVENOUS at 22:00

## 2018-04-27 RX ADMIN — Medication 25 MILLILITER(S): at 18:17

## 2018-04-27 RX ADMIN — INSULIN HUMAN 2: 100 INJECTION, SOLUTION SUBCUTANEOUS at 06:40

## 2018-04-27 RX ADMIN — Medication 650 MILLIGRAM(S): at 13:24

## 2018-04-27 RX ADMIN — Medication 1 PACKET(S): at 11:28

## 2018-04-27 RX ADMIN — CHLORHEXIDINE GLUCONATE 15 MILLILITER(S): 213 SOLUTION TOPICAL at 22:42

## 2018-04-27 RX ADMIN — ERGOCALCIFEROL 6000 UNIT(S): 1.25 CAPSULE ORAL at 11:32

## 2018-04-27 RX ADMIN — CHLORHEXIDINE GLUCONATE 1 APPLICATION(S): 213 SOLUTION TOPICAL at 06:46

## 2018-04-27 RX ADMIN — LEVETIRACETAM 500 MILLIGRAM(S): 250 TABLET, FILM COATED ORAL at 11:29

## 2018-04-27 RX ADMIN — MIDODRINE HYDROCHLORIDE 15 MILLIGRAM(S): 2.5 TABLET ORAL at 22:41

## 2018-04-27 RX ADMIN — SODIUM CHLORIDE 2000 MILLILITER(S): 9 INJECTION INTRAMUSCULAR; INTRAVENOUS; SUBCUTANEOUS at 14:36

## 2018-04-27 RX ADMIN — FLUDROCORTISONE ACETATE 0.1 MILLIGRAM(S): 0.1 TABLET ORAL at 06:40

## 2018-04-27 RX ADMIN — INSULIN HUMAN 7 UNIT(S): 100 INJECTION, SOLUTION SUBCUTANEOUS at 00:42

## 2018-04-27 RX ADMIN — Medication 166.67 MILLIGRAM(S): at 14:41

## 2018-04-27 RX ADMIN — ONDANSETRON 4 MILLIGRAM(S): 8 TABLET, FILM COATED ORAL at 19:41

## 2018-04-27 RX ADMIN — Medication 1 APPLICATION(S): at 11:29

## 2018-04-27 RX ADMIN — Medication 1 TABLET(S): at 11:27

## 2018-04-27 RX ADMIN — SODIUM CHLORIDE 1000 MILLILITER(S): 9 INJECTION INTRAMUSCULAR; INTRAVENOUS; SUBCUTANEOUS at 06:40

## 2018-04-27 RX ADMIN — INSULIN HUMAN 7 UNIT(S): 100 INJECTION, SOLUTION SUBCUTANEOUS at 06:40

## 2018-04-27 RX ADMIN — Medication 6 MILLIGRAM(S): at 00:42

## 2018-04-27 RX ADMIN — MODAFINIL 100 MILLIGRAM(S): 200 TABLET ORAL at 06:40

## 2018-04-27 RX ADMIN — Medication 1 MILLIGRAM(S): at 11:28

## 2018-04-27 RX ADMIN — CHLORHEXIDINE GLUCONATE 15 MILLILITER(S): 213 SOLUTION TOPICAL at 11:27

## 2018-04-27 RX ADMIN — Medication 50 MILLIGRAM(S): at 19:40

## 2018-04-27 RX ADMIN — QUETIAPINE FUMARATE 12.5 MILLIGRAM(S): 200 TABLET, FILM COATED ORAL at 01:45

## 2018-04-27 RX ADMIN — MIDODRINE HYDROCHLORIDE 15 MILLIGRAM(S): 2.5 TABLET ORAL at 04:57

## 2018-04-27 RX ADMIN — HEPARIN SODIUM 16 UNIT(S)/HR: 5000 INJECTION INTRAVENOUS; SUBCUTANEOUS at 01:46

## 2018-04-27 RX ADMIN — SODIUM CHLORIDE 1000 MILLILITER(S): 9 INJECTION INTRAMUSCULAR; INTRAVENOUS; SUBCUTANEOUS at 04:56

## 2018-04-27 RX ADMIN — ATORVASTATIN CALCIUM 80 MILLIGRAM(S): 80 TABLET, FILM COATED ORAL at 22:41

## 2018-04-27 RX ADMIN — Medication 100 MILLIGRAM(S): at 11:34

## 2018-04-27 RX ADMIN — PIPERACILLIN AND TAZOBACTAM 200 GRAM(S): 4; .5 INJECTION, POWDER, LYOPHILIZED, FOR SOLUTION INTRAVENOUS at 16:24

## 2018-04-27 RX ADMIN — Medication 81 MILLIGRAM(S): at 11:28

## 2018-04-27 RX ADMIN — INSULIN GLARGINE 10 UNIT(S): 100 INJECTION, SOLUTION SUBCUTANEOUS at 22:42

## 2018-04-27 NOTE — PROGRESS NOTE ADULT - PROBLEM SELECTOR PLAN 3
No iron deficiency anemia   Epo during dialysis tomorrow  Currently receiving 1 pRBC transfusion today

## 2018-04-27 NOTE — PROGRESS NOTE ADULT - PROBLEM SELECTOR PLAN 9
Hx of Afib and LV thrombus on coumadin prior to admission. TTE with [facility] shows no LV thrombus currently   - c/w holding Metoprolol 12.5 q12h given hypotension and HR as been controlled in 70-90s. Will use Dig for rate control if RVR- goal <110  -c/w hep gtt and continue with bridging to coumadin.       #LV thrombus  LV thrombus noted on RUKHSANA on 4/10 for which patient has been on hep gtt. Plan to bridge to coumadin tonight.   -dosed for coumadin x2 and remains subtherapeutic with INR this AM at 1.47, c/w hep gtt and dosed again for tonight.

## 2018-04-27 NOTE — PROCEDURE NOTE - PROCEDURE DATE TIME, MLM
22-Mar-2018 18:28
10-Apr-2018 16:02
12-Mar-2018 01:56
12-Mar-2018 01:59
13-Mar-2018 13:21
20-Mar-2018 13:14
21-Mar-2018 18:08
22-Mar-2018 18:30
17-Mar-2018 17:04
13-Mar-2018 16:02
27-Apr-2018

## 2018-04-27 NOTE — PROCEDURE NOTE - NSINFORMCONSENT_GEN_A_CORE
Yvon Montana/Benefits, risks, and possible complications of procedure explained to patient/caregiver who verbalized understanding and gave written consent.
Benefits, risks, and possible complications of procedure explained to patient/caregiver who verbalized understanding and gave verbal consent.
Benefits, risks, and possible complications of procedure explained to patient/caregiver who verbalized understanding and gave verbal consent.
Benefits, risks, and possible complications of procedure explained to patient/caregiver who verbalized understanding and gave written consent.
This was an emergent procedure.
From Daughter Cristal/Benefits, risks, and possible complications of procedure explained to patient/caregiver who verbalized understanding and gave verbal consent.
Benefits, risks, and possible complications of procedure explained to patient/caregiver who verbalized understanding and gave verbal consent.
Benefits, risks, and possible complications of procedure explained to patient/caregiver who verbalized understanding and gave verbal consent.

## 2018-04-27 NOTE — PROGRESS NOTE ADULT - PROBLEM SELECTOR PLAN 1
Patient is a 63  year old male with acute on chronic renal failure from ischemic ATN. Patient is currently requiring hemodialysis. Patient was last dialyzed 4/26 with UF of 1kg     P - Patient does not require emergent dialysis as volume status and electrolytes are acceptable   Anticipate next dialysis on 4/28  patient does NOT require albumin during dialysis

## 2018-04-27 NOTE — PROGRESS NOTE ADULT - PROBLEM SELECTOR PLAN 10
VTE: Coumadin  GI PPx: PPI    FULL CODE    F: No IVF  E: Replete electrolytes to maintain K>4 and Mg>2  N:  Restarted on tube feeds with Vital 1.5 Terrell at 63cc/hr    Dispo: Initially admitted to MICU but now on 7LACH for further management of hypotension in setting of shock, acute encephalopathy, bacteremia and acute on chronic respiratory failure. With worsening clinical status determined to warrant further HD/resp monitoring on MICU.

## 2018-04-27 NOTE — PROCEDURE NOTE - NSPROCDETAILS_GEN_ALL_CORE
hemostasis with direct pressure, dressing applied/positive blood return obtained via catheter/connected to a pressurized flush line/Seldinger technique/all materials/supplies accounted for at end of procedure/sutured in place/location identified, draped/prepped, sterile technique used, needle inserted/introduced
ultrasound guidance/lumen(s) aspirated and flushed/sterile dressing applied/sterile technique, catheter placed/guidewire recovered
patient pre-oxygenated, tube inserted, placement confirmed
positive blood return obtained via catheter/location identified, draped/prepped, sterile technique used, needle inserted/introduced/hemostasis with direct pressure, dressing applied/connected to a pressurized flush line/all materials/supplies accounted for at end of procedure/sutured in place
lumen(s) aspirated and flushed/sterile dressing applied/guidewire recovered/sterile technique, catheter placed/ultrasound guidance
sutured in place/connected to a pressurized flush line/all materials/supplies accounted for at end of procedure/location identified, draped/prepped, sterile technique used, needle inserted/introduced/positive blood return obtained via catheter/hemostasis with direct pressure, dressing applied
lumen(s) aspirated and flushed/sterile dressing applied/ultrasound guidance/guidewire recovered/sterile technique, catheter placed
ultrasound assessment/supine position/sterile dressing applied/sterile technique, catheter placed/ultrasound guidance/location identified, draped/prepped, sterile technique used
guidewire recovered/lumen(s) aspirated and flushed/ultrasound guidance/sterile technique, catheter placed/sterile dressing applied
sterile technique, catheter placed/guidewire recovered/lumen(s) aspirated and flushed/sterile dressing applied/ultrasound guidance
guidewire recovered/sterile technique, catheter placed/lumen(s) aspirated and flushed/sterile dressing applied/ultrasound guidance

## 2018-04-27 NOTE — PROCEDURE NOTE - NSANESTHESIA_GEN_A_CORE
2% lidocaine
1% lidocaine
2% lidocaine
1% lidocaine
2% lidocaine
1% lidocaine

## 2018-04-27 NOTE — PROGRESS NOTE ADULT - ATTENDING COMMENTS
Patient seen and examined with house-staff during bedside rounds.  Resident note read, including vitals, physical findings, laboratory data, and radiological reports.   Revisions included below.  Direct personal management at bed side and extensive interpretation of the data.  Plan was outlined and discussed in details with the housestaff.  Decision making of high complexity  Action taken for acute disease activity to reflect the level of care provided:  - medication reconciliation  - review laboratory data  - events noticed  - transferred to ICU  - follow with ICU team

## 2018-04-27 NOTE — PROCEDURE NOTE - NSPOSTPRCRAD_GEN_A_CORE
central line located in the superior vena cava/no pneumothorax
post-procedure radiography performed/no pneumothorax/central line located in the superior vena cava
central line located in the superior vena cava/no pneumothorax/post-procedure radiography performed
line in appropriate postion/ultrasound/tip @ axilla/postion of catheter
no pneumothorax/post-procedure radiography performed/central line located in the/central line located in the superior vena cava

## 2018-04-27 NOTE — PROCEDURE NOTE - NSASSISTBY_GEN_A_CORE
Resident/Dr. Zamarripa
Anesthesiologist
Attending/Myself
Fellow
Jeremy Davis MD/Fellow
nurse/Myself
teri gruber/PA
Other/RN
Dr Davis/Fellow
Fellow

## 2018-04-27 NOTE — PROGRESS NOTE ADULT - PROBLEM SELECTOR PLAN 1
Course has been complicated by septic shock as well as component of cardiogenic shock. Patient required pressors and inotropes for which he has been weaned off. On midodrine 15 mg TID. Has also been on albumin with dialysis and was weaned off yesterday.  - c/w Midodrine 15 mg q8h to maintain SBP>65  - c/w fludrocortisone 0.1mg qd and prednisone 6 mg BID for oral regimen    #Adrenal Insufficiency  BP improved immediately after hydrocortisone and able to wean off levophed  - c/w fludrocortisone and prednisone for PO regimen

## 2018-04-27 NOTE — PROGRESS NOTE ADULT - PROBLEM SELECTOR PLAN 3
Patient acutely unresponsive since 3/22 with waxing/ waning mental status. CT, CTA negative. VEEG w/ no seizure activity. Decline in mental status likely 2/2 to underlying infection vs acute neurologic event. 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 Per neurology may have had ischemic event from hypoperfusion. Also showing disc protrusion at C3/C4 level coursing superiorly to the C2/C3 contacting the ventral spinal cord.  S/p PEG/trach on 4/4. Repeat CT head 4/11 performed due to concern for not moving LUE, showing only chronic infarctions. CT C-spine w/o contrast showing multilevel degenerative changes with mod-severe foraminal narrowing @ C3-C4  - C/w Modafinil  - General neurology following with no interventions at this time.  - Pending repeat MRI head for prognostication of CVA  -Was more lethargic on exam this AM. Was initially attributed to seroquel given o/n. But with acute worsening concern for new infection.

## 2018-04-27 NOTE — PROGRESS NOTE ADULT - SUBJECTIVE AND OBJECTIVE BOX
7[facility] STEPUP TO MICU  PGY-1 TRANSFER ACCEPTANCE NOTE    HOSPITAL COURSE:      SUBJECTIVE: Patient seen and examined at bedside. NAD. No respiratory distress    CONSTITUTIONAL: No weakness, fevers or chills  EYES/ENT: No visual changes;  No vertigo or throat pain   NECK: No pain or stiffness  RESPIRATORY: No cough, wheezing, hemoptysis; No shortness of breath  CARDIOVASCULAR: No chest pain or palpitations  GASTROINTESTINAL: No abdominal or epigastric pain. No nausea, vomiting, or hematemesis; No diarrhea or constipation. No melena or hematochezia.  GENITOURINARY: No dysuria, frequency or hematuria  NEUROLOGICAL: No numbness or weakness  SKIN: No itching, rashes    OBJECTIVE:    VITAL SIGNS:  ICU Vital Signs Last 24 Hrs  T(C): 36.8 (27 Apr 2018 09:00), Max: 37.6 (26 Apr 2018 23:12)  T(F): 98.2 (27 Apr 2018 09:00), Max: 99.7 (26 Apr 2018 23:12)  HR: 122 (27 Apr 2018 13:20) (94 - 123)  BP: 127/99 (27 Apr 2018 13:20) (81/55 - 127/99)  BP(mean): 111 (27 Apr 2018 13:20) (63 - 111)  ABP: --  ABP(mean): --  RR: 20 (27 Apr 2018 13:20) (15 - 20)  SpO2: 95% (27 Apr 2018 13:20) (95% - 100%)    Mode: AC/ CMV (Assist Control/ Continuous Mandatory Ventilation), RR (machine): 10, TV (machine): 500, FiO2: 100, PEEP: 5, ITime: 1, MAP: 13, PIP: 22    04-26 @ 07:01  -  04-27 @ 07:00  --------------------------------------------------------  IN: 0 mL / OUT: 1000 mL / NET: -1000 mL      CAPILLARY BLOOD GLUCOSE      POCT Blood Glucose.: 80 mg/dL (27 Apr 2018 11:28)      PHYSICAL EXAM:    General: NAD  HEENT: NC/AT; PERRL, clear conjunctiva  Neck: supple  Respiratory: CTA b/l  Cardiovascular: +S1/S2; RRR  Abdomen: soft, NT/ND; +BS x4  Extremities: WWP, 2+ peripheral pulses b/l; no LE edema  Skin: normal color and turgor; no rash  Neurological:    MEDICATIONS:  MEDICATIONS  (STANDING):  aspirin  chewable 81 milliGRAM(s) Oral daily  atorvastatin 80 milliGRAM(s) Oral at bedtime  chlorhexidine 0.12% Liquid 15 milliLiter(s) Swish and Spit two times a day  chlorhexidine 2% Cloths 1 Application(s) Topical daily  dextrose 5%. 1000 milliLiter(s) (50 mL/Hr) IV Continuous <Continuous>  dextrose 50% Injectable 12.5 Gram(s) IV Push once  dextrose 50% Injectable 25 Gram(s) IV Push once  dextrose 50% Injectable 25 Gram(s) IV Push once  ergocalciferol Drops 6000 Unit(s) Oral daily  escitalopram 5 milliGRAM(s) Oral daily  fludroCORTISONE 0.1 milliGRAM(s) Oral daily  folic acid 1 milliGRAM(s) Oral daily  heparin  Infusion 1600 Unit(s)/Hr (16 mL/Hr) IV Continuous <Continuous>  insulin glargine Injectable (LANTUS) 21 Unit(s) SubCutaneous at bedtime  insulin regular  human corrective regimen sliding scale   SubCutaneous every 6 hours  insulin regular  human recombinant 7 Unit(s) SubCutaneous every 6 hours  levETIRAcetam  Solution 250 milliGRAM(s) Oral <User Schedule>  levETIRAcetam  Solution 500 milliGRAM(s) Oral every 24 hours  midodrine 15 milliGRAM(s) Oral every 8 hours  modafinil 100 milliGRAM(s) Oral <User Schedule>  multivitamin 1 Tablet(s) Oral daily  pantoprazole   Suspension 40 milliGRAM(s) Oral daily  predniSONE   Tablet 6 milliGRAM(s) Oral every 12 hours  psyllium Powder 1 Packet(s) Oral daily  silver sulfADIAZINE 1% Cream 1 Application(s) Topical daily  thiamine 100 milliGRAM(s) Oral daily  warfarin 5 milliGRAM(s) Oral at bedtime    MEDICATIONS  (PRN):  acetaminophen    Suspension. 650 milliGRAM(s) Oral every 6 hours PRN Moderate Pain (4 - 6)  acetaminophen  Suppository 650 milliGRAM(s) Rectal every 6 hours PRN For Temp greater than 38 C (100.4 F)  dextrose Gel 1 Dose(s) Oral once PRN Blood Glucose LESS THAN 70 milliGRAM(s)/deciliter  glucagon  Injectable 1 milliGRAM(s) IntraMuscular once PRN Glucose LESS THAN 70 milligrams/deciliter      ALLERGIES:  Allergies    No Known Allergies    Intolerances        LABS:                        6.7    14.1  )-----------( 228      ( 27 Apr 2018 05:41 )             22.5     04-27    135  |  94<L>  |  41<H>  ----------------------------<  183<H>  4.3   |  23  |  3.04<H>    Ca    8.6      27 Apr 2018 05:40  Phos  6.7     04-26  Mg     1.9     04-27      PT/INR - ( 27 Apr 2018 05:42 )   PT: 16.4 sec;   INR: 1.47          PTT - ( 27 Apr 2018 05:42 )  PTT:77.0 sec      RADIOLOGY & ADDITIONAL TESTS: Reviewed. [facility] STEPUP TO MICU  PGY-1 TRANSFER ACCEPTANCE NOTE    HOSPITAL COURSE:  63M PMH HFrEF 10-15% (ischemic), CAD (STEMI per [facility] records 7/17), s/p AICD, Afib, HTN, DM2 on insulin, CKD, and gout admitted for septic shock 2/2 LE cellulitis as well as concern for ATN in setting of shock. Pt was started on levophed for hypotension. Dobutamine given low mix venous saturation with concern for component of cardiogenic shock however had to be discontinued given tachycardia.  Pt also experienced tachycardia on Milrinone. Pt was started on Vanco/ Zosyn and completed total 11 days of abx. PT was started on solucortef 50 q6h given recent hx of steroid use. Gallium scan demosntrated LLE  cellulitis. TTE with no vegetations. Given worsening GILMER and low UOP, renal was consulted. HD cath was placed and pt was started on CVVD and eventually transitioned to intermittent HD. Pt was noted have b/l pulmonary effusion and required R chest tube placement with fluid studies showing exudative process. Given extensive cardiac hx and component of cardiogenic shock, cardiology was consulted. Pt was started on Vasopressin to improve blood pressure. Pt also stated on Isordil and Hydralazine but pt was not  able to tolerate the medications. Pt was started  on Hep gtt for Afib but switched to Argatroban given possibility of HIT. HIT was ruled out and patient was bridged to coumadin. Pt was stable for transfer to CCU for further management of cardiogenic shock however on 3/22, pt became unresponsive post A line placement. Vital signs were normal during event. Stroke code was called and he was intubated for airway protection. CT was negative MRI after 4 days noted to have chronic infarcts with possible small brainstem stroke per neurology read. He was started on Modafinil however mental status did not improve significantly. VEEG significant for slowing but no seizures. After GOC discussions with family patient had trach and PEG on 4/4 with plans to be discharged to [facility] when stable. On 4/8 patient was found to be fungemic with candida tropicalis and started on micafungin, then switched to fluconazole on 4/13 when sensitivities resulted. RUKHSANA was negative for vegetations or infection of AICD. CT A/P performed given transamnitis/ elevated bili but source of candidemia remains unclear. HD Permacath was removed 4/8 and was replaced on 4/12 when surveillance cultures had been negative for 48 hours. However overnight on 4/12 patient spiked fever again and blood, urine and sputum cultures returned positive for Klebsiella. He was started on meropenem for 1 day, then switched to Zosyn for 2 days and finally placed on ceftriaxone when sensitivities returned on 4/15. Patient restarted on hydrocortisone in attempt to wean off pressors. Thrombocytopenia resolved and patient restarted on heparin gtt for A-fib. Given continued low-grade fevers and uptrending white count, permacath removed on 4/19 and replaced with temp HD cath. The following weekend, noticed pt with seizure-like activity and VEEG placed. Per neuro, started on keppra 500 mg qd with additional 250cc keppra on post-HD days. End goal of L-tachy placement.  Transitioned to PO prednisone and fludrocortisone to replace hydrocortisone. L-tachy will not accept albumin pushes, and goal to wean patient off albumin during HD for hypotension. In addition, pt to have repeat MRI for prognostication of CVA. Pt also noticed to have L forearm eschar that has enlarged and increasing fluctuance. LUE US ordered also showing R superficial thrombus; reordered soft tissue US to eval for abscess in L forearm. Ortho for hand consulted for possible I&D- ultrasound demonstrated no drainage fluid- soft tissue for which recommending nothing to do. Albumin was weaned off during yesterday's dialysis session. Warfarin was dosed and remains subtherapeutic and remains on hep gtt.     O/n patient noted to become more agitated and pulling at the trach for which patient given seroquel and wrist restraints. Also noted to have SBP in 80s for which patient given 500cc bolus. On AM labs this AM patient noted to have Hgb of 6.7 from 7.4- no bloody output noted. Given 1UPRBC. Of noted, some abdominal distension this AM. After rounds, patient noted to be coughing up feeds/secretions- 100cc residuals noted. Aggressively suctioned. Patient started to become tachycardiac to HR 120s. CXR performed demonstrating new R lower lobe. During work up patient noted to have vent disconnected and concern for secretions and loose trach. Pulm fellow called to bedside and ICU seniors to bedside. ABG drawn and determined to warrant further HD/resp monitoring in MICU.     SUBJECTIVE: Patient seen and examined at bedside. NAD. No respiratory distress. No acute distress. Laying in bed comfortably. Intermittently following command to open eyes. Otherwise, extremities remain rigid. Pt responding to verbal and tactile stimuli. Unable to assess ROS.       OBJECTIVE:    VITAL SIGNS:  ICU Vital Signs Last 24 Hrs  T(C): 36.8 (27 Apr 2018 09:00), Max: 37.6 (26 Apr 2018 23:12)  T(F): 98.2 (27 Apr 2018 09:00), Max: 99.7 (26 Apr 2018 23:12)  HR: 122 (27 Apr 2018 13:20) (94 - 123)  BP: 127/99 (27 Apr 2018 13:20) (81/55 - 127/99)  BP(mean): 111 (27 Apr 2018 13:20) (63 - 111)  ABP: --  ABP(mean): --  RR: 20 (27 Apr 2018 13:20) (15 - 20)  SpO2: 95% (27 Apr 2018 13:20) (95% - 100%)    Mode: AC/ CMV (Assist Control/ Continuous Mandatory Ventilation), RR (machine): 10, TV (machine): 500, FiO2: 100, PEEP: 5, ITime: 1, MAP: 13, PIP: 22    04-26 @ 07:01  -  04-27 @ 07:00  --------------------------------------------------------  IN: 0 mL / OUT: 1000 mL / NET: -1000 mL      CAPILLARY BLOOD GLUCOSE      POCT Blood Glucose.: 80 mg/dL (27 Apr 2018 11:28)      PHYSICAL EXAM:    General: NAD, no respiratory distress, laying comfortably in bed, responsive to verbal and tactile stimuli  HEENT: NC/AT; PERRL, clear conjunctiva  Neck: supple, trach in place, R permacath  Respiratory: b/l rhonchi in upper lobes anteriorly, no wheezing or rales, no retractions or labored breathing, on ventilatory support   Cardiovascular: +S1/S2; RRR, no M/R/G  Abdomen: soft, moderately distended, hypoactive BS, no TTP, no rebound or guarding,  PEG in place C/D/I  Extremities: WWP, 2+ pitting edema to level of thigh b/l, LUE with eschar on medial surface of forearm with surrounding tense edema, +fluctuance  Vasc: 2+ peripheral pulses b/l  Skin: dry, b/l LE with peeling skin, wrinkled/weeping skin of dorsal feet b/l  Neurological: nonverbal, intermittently following commands to open and close eyes, RUE flexed and rigid, LUE rigid, no spontaneous movement of b/l LE    MEDICATIONS:  MEDICATIONS  (STANDING):  aspirin  chewable 81 milliGRAM(s) Oral daily  atorvastatin 80 milliGRAM(s) Oral at bedtime  chlorhexidine 0.12% Liquid 15 milliLiter(s) Swish and Spit two times a day  chlorhexidine 2% Cloths 1 Application(s) Topical daily  dextrose 5%. 1000 milliLiter(s) (50 mL/Hr) IV Continuous <Continuous>  dextrose 50% Injectable 12.5 Gram(s) IV Push once  dextrose 50% Injectable 25 Gram(s) IV Push once  dextrose 50% Injectable 25 Gram(s) IV Push once  ergocalciferol Drops 6000 Unit(s) Oral daily  escitalopram 5 milliGRAM(s) Oral daily  fludroCORTISONE 0.1 milliGRAM(s) Oral daily  folic acid 1 milliGRAM(s) Oral daily  heparin  Infusion 1600 Unit(s)/Hr (16 mL/Hr) IV Continuous <Continuous>  insulin glargine Injectable (LANTUS) 21 Unit(s) SubCutaneous at bedtime  insulin regular  human corrective regimen sliding scale   SubCutaneous every 6 hours  insulin regular  human recombinant 7 Unit(s) SubCutaneous every 6 hours  levETIRAcetam  Solution 250 milliGRAM(s) Oral <User Schedule>  levETIRAcetam  Solution 500 milliGRAM(s) Oral every 24 hours  midodrine 15 milliGRAM(s) Oral every 8 hours  modafinil 100 milliGRAM(s) Oral <User Schedule>  multivitamin 1 Tablet(s) Oral daily  pantoprazole   Suspension 40 milliGRAM(s) Oral daily  predniSONE   Tablet 6 milliGRAM(s) Oral every 12 hours  psyllium Powder 1 Packet(s) Oral daily  silver sulfADIAZINE 1% Cream 1 Application(s) Topical daily  thiamine 100 milliGRAM(s) Oral daily  warfarin 5 milliGRAM(s) Oral at bedtime    MEDICATIONS  (PRN):  acetaminophen    Suspension. 650 milliGRAM(s) Oral every 6 hours PRN Moderate Pain (4 - 6)  acetaminophen  Suppository 650 milliGRAM(s) Rectal every 6 hours PRN For Temp greater than 38 C (100.4 F)  dextrose Gel 1 Dose(s) Oral once PRN Blood Glucose LESS THAN 70 milliGRAM(s)/deciliter  glucagon  Injectable 1 milliGRAM(s) IntraMuscular once PRN Glucose LESS THAN 70 milligrams/deciliter      ALLERGIES:  Allergies    No Known Allergies    Intolerances        LABS:                        6.7    14.1  )-----------( 228      ( 27 Apr 2018 05:41 )             22.5     04-27    135  |  94<L>  |  41<H>  ----------------------------<  183<H>  4.3   |  23  |  3.04<H>    Ca    8.6      27 Apr 2018 05:40  Phos  6.7     04-26  Mg     1.9     04-27      PT/INR - ( 27 Apr 2018 05:42 )   PT: 16.4 sec;   INR: 1.47          PTT - ( 27 Apr 2018 05:42 )  PTT:77.0 sec      RADIOLOGY & ADDITIONAL TESTS: Reviewed.

## 2018-04-27 NOTE — PROCEDURE NOTE - NSTOLERANCE_GEN_A_CORE
Patient tolerated procedure well.

## 2018-04-27 NOTE — PROCEDURE NOTE - NSCOMPLICATION_GEN_A_CORE
no complications

## 2018-04-27 NOTE — PROGRESS NOTE ADULT - SUBJECTIVE AND OBJECTIVE BOX
Patient seen and examined at bedside. Patient is a 63 year old male with a history of HFrEF 10-15% due to ischemic cardiomyopathy, s/p AICD, ?atrial fibrillation, hypertension, diabetes mellitus type 2, gout, and CKD for whom nephrology was called for GILMER from ATN requiring hemodialysis. Patient appears to be under no acute distress. Patient is currently receiving 1 pRBC transfusion. Patient underwent dialysis 4/26 with UF of 1kg. Patient did NOT require albumin during dialysis.     acetaminophen    Suspension 650 milliGRAM(s) every 6 hours PRN  acetaminophen    Suspension. 650 milliGRAM(s) every 6 hours PRN  aspirin  chewable 81 milliGRAM(s) daily  atorvastatin 80 milliGRAM(s) at bedtime  chlorhexidine 0.12% Liquid 15 milliLiter(s) two times a day  chlorhexidine 2% Cloths 1 Application(s) daily  dextrose 5%. 1000 milliLiter(s) <Continuous>  dextrose 50% Injectable 12.5 Gram(s) once  dextrose 50% Injectable 25 Gram(s) once  dextrose 50% Injectable 25 Gram(s) once  dextrose Gel 1 Dose(s) once PRN  ergocalciferol Drops 6000 Unit(s) daily  escitalopram 5 milliGRAM(s) daily  fludroCORTISONE 0.1 milliGRAM(s) daily  folic acid 1 milliGRAM(s) daily  glucagon  Injectable 1 milliGRAM(s) once PRN  heparin  Infusion 1600 Unit(s)/Hr <Continuous>  insulin glargine Injectable (LANTUS) 21 Unit(s) at bedtime  insulin regular  human corrective regimen sliding scale   every 6 hours  insulin regular  human recombinant 7 Unit(s) every 6 hours  levETIRAcetam  Solution 250 milliGRAM(s) <User Schedule>  levETIRAcetam  Solution 500 milliGRAM(s) every 24 hours  midodrine 15 milliGRAM(s) every 8 hours  modafinil 100 milliGRAM(s) <User Schedule>  multivitamin 1 Tablet(s) daily  pantoprazole   Suspension 40 milliGRAM(s) daily  predniSONE   Tablet 6 milliGRAM(s) every 12 hours  psyllium Powder 1 Packet(s) daily  silver sulfADIAZINE 1% Cream 1 Application(s) daily  thiamine 100 milliGRAM(s) daily    Allergies    No Known Allergies    Intolerances    T(C): , Max: 37.6 (04-26-18 @ 23:12)  T(F): , Max: 99.7 (04-26-18 @ 23:12)  HR: 94 (04-27-18 @ 08:41)  BP: 81/55 (04-27-18 @ 08:41)  BP(mean): 63 (04-27-18 @ 08:41)  RR: 18 (04-27-18 @ 08:41)  SpO2: 100% (04-27-18 @ 08:41)    04-26 @ 07:01  -  04-27 @ 07:00  --------------------------------------------------------  IN:  Total IN: 0 mL    OUT:    Other: 1000 mL  Total OUT: 1000 mL    Total NET: -1000 mL    LABS:                        6.7    14.1  )-----------( 228      ( 27 Apr 2018 05:41 )             22.5     04-27    135  |  94<L>  |  41<H>  ----------------------------<  183<H>  4.3   |  23  |  3.04<H>    Ca    8.6      27 Apr 2018 05:40  Phos  6.7     04-26  Mg     1.9     04-27    PT/INR - ( 27 Apr 2018 05:42 )   PT: 16.4 sec;   INR: 1.47       PTT - ( 27 Apr 2018 05:42 )  PTT:77.0 sec

## 2018-04-27 NOTE — PROGRESS NOTE ADULT - PROBLEM SELECTOR PLAN 2
#Aspiration  Concern for aspiration today for which patient found to be coughing up feeds by nurse, underwent aggressive suctioning and concern for increasing O2 requirements and complication with trach. CXR notable for new infilitrate on RLL.   - Consider starting Unasyn    #Bacteremia  During course, noted to have Klebsiella growing in Urine, blood and sputum. Permacath removed on 4/19 for concern of potential source but cath tip negative growth.  Initially started on Meropenem (1 day), switched to Zosyn (2 days) and narrowed to Ceftriaxone based on sensitivities. Patient completed Ceftriaxone yesterday.    #Eschar  Noted eschar on LUE with some soft tissue swelling surrounding. Ortho hand consulted and U/S demonstrates no drainable fluid collection. Therefore nothing to do as per ortho.    #Candidemia with candida tropicalis  Blood cultures positive for candida tropicalis on 4/7 and again on 4/9. No source identified. CTAP 4/11 negative for abscess. RUKHSANA negative for vegetations/ infection of AICD; Tunelled HD catheter removed 4/8, temp HD placed 4/10, permanent HD cath placed 4/12. Permacath removed 4/19 Completed Fluconazole 200mg q 24hrs (4/13-4/24).  - Surveillance fungal cultures 4/10- For which means that fungemia has resolved.     #UTI- urine culture from 4/11 growing klebsiella sensitive to ceftriaxone, and bacteremia with GNR likely 2/2 klebsiella (in urine and lung). Completed Ceftriaxone course.    #Elevated AG- at 18 this AM- noted to be downtrending. Neg lactate, glucose has been well controlled, possibly 2/2 uremia in setting of ESRD.   - Monitor BMP

## 2018-04-27 NOTE — PROCEDURE NOTE - NSCVLACTUALSITE_VASC_A_CORE
left/internal jugular
left/internal jugular
femoral vein/right
left/internal jugular
right/internal jugular
right

## 2018-04-27 NOTE — PROGRESS NOTE ADULT - PROBLEM SELECTOR PLAN 6
DM2 on Januvia at home. HbA1C 8.6. Has been on Lantus 42U qHS, insulin 7 units q6h. Insulin was decreased to 21U for being NPO but noted to have complication of hypoglycemia.   -c/w MISS, Lantus 21 qHS and regular insulin 7U q6h.    #Hypoglycemia  Noted to have complication of hypoglycemia with FS in 60s. Started on D10 while NPO for permacath.  -Resolved s/p D10

## 2018-04-27 NOTE — PROCEDURE NOTE - NSINDICATIONS_GEN_A_CORE
monitoring purposes
airway protection/mental status change
venous access
antibiotic therapy
blood sampling/arterial puncture to obtain ABG's
dialysis/CRRT
dialysis/CRRT/critical illness
hemodynamic monitoring/critical illness/emergency venous access
dialysis/CRRT
emergency venous access
monitoring purposes/critical patient

## 2018-04-27 NOTE — PROGRESS NOTE ADULT - ASSESSMENT
63M PMH HFrEF 10-15% (ischemic), MI, s/p AICD vs PPM, possible Afib, HTN, DM2 on insulin, possible CKD, and gout, who presented with a chief complaint of generalized weakness s/p septic shock likely 2/2 LE cellulitis complicated by ATN requiring dialysis. Course also c/b AMS likely from brainstem infarct 2/2 LV thrombus and/or toxic metabolic encephalopathy and found to have candidemia and sepsis 2/2 klebsiella bacteremia s/p fluconazole and CTX and de-lining. Transitioned to PO meds through PEG for end-goal of L-TACH at new baseline mental status. Pt continuing on HD with new permacath placed 4/25 and a/c with hep bridge to coumadin for afib and LV thrombus. Currently ventilator dependent tolerating daily CPAP trials. Remaining stable and medically optimized for step down to 7[facility]ach.    NEURO  #Toxic Metabolic Encephalopathy- Likely 2/2 acute infarct on imaging; Patient acutely unresponsive since 3/22 and has had waxing/ waning mental status since. CT, CTA negative. VEEG w/ no seizure activity. Decline in mental status likely 2/2 to underlying infection vs acute neurologic event. 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 Per neurology may have had ischemic event from hypoperfusion. Also showing disc protrusion at C3/C4 level coursing superiorly to the C2/C3 contacting the ventral spinal cord. Per neurology, this may be contributing to weakness, however would not explain change in mental status.  S/p PEG/trach on 4/4. Repeat CT head 4/11 performed due to concern for not moving LUE, showing only chronic infarctions. CT C-spine w/o contrast showing multilevel degenerative changes with mod-severe foraminal narrowing @ C3-C4  - C/w Modafinil  - per neuro, no acute interventions   - patient at new baseline mental status  - goal for placement at L-TACH when stabilized  - MRI head repeat for prognostication of CVA    #Seizures- possible seizure activity seen on VEEG 4/22. Started on Keppra 1000mg after HD 4/22.  - off VEEG  - c/w keppra 500 mg qd with extra 250 mg post HD days    ID  #Klebsiella Bacteremia- Urine, blood and sputum cx growing klebsiella. S/p Zosyn 4/14-4/15. S/p Meropenem 4/13. Sensitive to ceftriaxone.  -C/w Ceftriaxone 2g q 24 hrs (4/15-4/26)  -surveillance blood cultures NGTD   -f/u ID recs  -s/p permacath removal 4/19; cath tip negative growth   -permacath planned for 4/25    #Candidemia with candida tropicalis- Blood cultures positive for candida tropicalis on 4/7 and again on 4/9. No source identified. CTAP 4/11 negative for abscess. RUKHSANA negative for vegetations/ infection of AICD; Tunelled HD catheter removed 4/8, temp HD placed 4/10, permanent HD cath placed 4/12  - s/p completion of Fluconazole 200mg q 24hrs (4/13-4/24)  - Surveillance fungal cultures 4/10 NGTD  - permacath removed 4/19    #UTI- urine culture from 4/11 growing klebsiella sensitive to ceftriaxone, and now bacteremia with GNR likely 2/2 klebsiella (in urine and lung).  - c/w management as per above    CARDIOVASCULAR   # Hypotension  - c/w Midodrine 15 mg q8h to maintain SBP>65  - c/w fludrocortisone 0.1mg qd and prednisone 6 mg BID for oral regimen    # CHF with EF 10-15% 2/2 ischemic cardiomyopathy s/p AICD. Elevated BNP on admission with R sided effusion and edema. Patient with persistent pleural effusions on CXR and US and b/l dependent edema.   - Maintain negative fluid balance with dialysis  - c/w holding ACE/BB in setting of sepsis/ hypotension on midodrine    #AFIB- hx of Afib and LV thrombus on coumadin prior to admission; TTE with definity shows no LV thrombus currently   - c/w Heparin gtt  - c/w holding Metoprolol 12.5 q12h given hypotension. Will use Dig for rate control if RVR  - HR 90s-100s; goal <110    #LV thrombus: RUKHSANA on 4/10 showing obvious thrombus in LV  -c/w hep gtt bridging to coumadin beginning 4/25    #CAD- Hx of MI and ischemic CM s/p AICD, STEMI 7/2017, was started on Aspirin and Brilinta per records from [place]  - c/w aspirin 81 and Atorvastatin 80mg qhs.     #LE Edema- LE edema with chronic venous stasis. Duplex does not show DVT    PULMONARY   #Chronic Resp failure- S/p tracheostomy 4/5. Bedside US with simple b/l pleural effusions and atelectatic lung. Less concern for infectious source given b/l effusions with no septations/loculations. Likely related to fluid overload. If clinically worsening, can consider thoracentesis for fluid studies and cultures.  - c/w daily CPAP trials, weaning to trach collar as tolerated  - c/w HD to remove excess fluid  - HD last 4/24    RENAL   #Chronic renal failure- likely ischemic ATN in setting of sepsis with low intravascular volume. Unknown baseline Cr presenting with Cr 3.93 with metabolic derangements. Renal team on board, now on intermittent HD. HD catheter removed 4/8 due to fungemia, now with Permacath removed 4/19 and replaced with L subclavian HD cath.   - Last HD 4/24, c/w intermittent HD per renal recs  - will need new permacath placement 4/25  -attempt to undergo HD w/o additional albumin for hypotension    #Adrenal Insufficiency- BP improved immediately after hydrocortisone and able to wean off levophed  - c/w fludrocortisone and prednisone for PO regimen    #Elevated AG- Fluctuating at low level; lactate negative, glucose has been well controlled, possibly 2/2 uremia in setting of ESRD.   - Monitor BMP    #Hyperkalemia- PT with intermittently elevated K,  no EKG changes.  - Trend K   - c/w dialysis   - Kayexalate PRN    GI   #PEG in place- C/D/I. c/w tube feeds (holding for IR procedure)  #Diarrhea likely 2/2 tube feeds, C.diff ruled out. Improving. Rectal tube in place--remove today    HEME  #Thrombocytopenia- Resolved. Most likely 2/2 zosyn or sepsis.   #Elevated INR- Improved. Unclear etiology. S/p Vit K and FFPx3. Monitor coags.    ENDOCRINE   #Diabetes- HbA1C 8.6.  - c/w regular insulin 7 units q6  - c/w lantus 42 unit qhs --> decreased by half as NPO to 21units  - MISS  - monitor FSG and adjust as needed   -started on D10W @ 70 cc/hr for hypoglycemia to 60s     MSK  #LUE Swelling- noted to have eschar with surrounding edema which is tense, + fluctuance and enlarging.   - LUE US ordered -f/u  - consult hand for possible I&D     F: D10W @ 70cc/hr   E: Replete K<4 Mg<2, caution in setting of acute renal failure  N: PEG Placed 4/4, Vital 1.5 given diarrhea, NPO for permacath  DVT ppx: on heparin drip  GI ppx: PPI 40 daily  Lines: L subclavian HD cath placed 4/19  Drips: none  Full code  Dispo: MICU 63M PMH HFrEF 10-15% (ischemic), MI, s/p AICD vs PPM, possible Afib, HTN, DM2 on insulin, possible CKD, and gout, who presented with a chief complaint of generalized weakness s/p septic shock likely 2/2 LE cellulitis complicated by ATN requiring dialysis. Course also c/b AMS likely from brainstem infarct 2/2 LV thrombus and/or toxic metabolic encephalopathy and found to have candidemia and sepsis 2/2 klebsiella bacteremia s/p fluconazole and CTX and de-lining. Transitioned to PO meds through PEG for end-goal of L-TACH at new baseline mental status. Pt continuing on HD with new permacath placed 4/25 and a/c with hep bridge to coumadin for afib and LV thrombus. Currently ventilator dependent tolerating daily CPAP trials. Remaining stable and medically optimized for step down to [facility].    NEURO  #Toxic Metabolic Encephalopathy- Likely 2/2 acute infarct on imaging; Patient acutely unresponsive since 3/22 and has had waxing/ waning mental status since. CT, CTA negative. VEEG w/ no seizure activity. Decline in mental status likely 2/2 to underlying infection vs acute neurologic event. 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 Per neurology may have had ischemic event from hypoperfusion. Also showing disc protrusion at C3/C4 level coursing superiorly to the C2/C3 contacting the ventral spinal cord. Per neurology, this may be contributing to weakness, however would not explain change in mental status.  S/p PEG/trach on 4/4. Repeat CT head 4/11 performed due to concern for not moving LUE, showing only chronic infarctions. CT C-spine w/o contrast showing multilevel degenerative changes with mod-severe foraminal narrowing @ C3-C4  - C/w Modafinil  - no acute interventions   - patient at new baseline mental status  - goal for placement at L-TACH when stabilized  - MRI head repeat for prognostication of CVA- ordered    #Seizures- possible seizure activity seen on VEEG 4/22. Started on Keppra 1000mg after HD 4/22.  - off VEEG  - c/w keppra 500 mg qd with extra 250 mg post HD days    ID  #Pneumonia: likely 2/2 aspiration of feeds given clinical course; pt coughing up feeds/secretion through trach with residual feeds in stomach  -started on vanc, dosing by level4/27--ordered for vanc 1250mg x1 and recheck vanc level in AM   -started on zosyn 2.25 q8 4/27  -CXR showing new RLL infiltrate  -b/ cx 2/27- f/u  -sputum cx-f/u    #Klebsiella Bacteremia- Urine, blood and sputum cx growing klebsiella. S/p Zosyn 4/14-4/15. S/p Meropenem 4/13. Sensitive to ceftriaxone.  -s/p Ceftriaxone 2g q 24 hrs (4/15-4/26)  -surveillance blood cultures NGTD   -f/u ID recs  -s/p permacath removal 4/19; cath tip negative growth   -permacath planned for 4/25    #Candidemia with candida tropicalis- Blood cultures positive for candida tropicalis on 4/7 and again on 4/9. No source identified. CTAP 4/11 negative for abscess. RUKHSANA negative for vegetations/ infection of AICD; Tunelled HD catheter removed 4/8, temp HD placed 4/10, permanent HD cath placed 4/12  - s/p completion of Fluconazole 200mg q 24hrs (4/13-4/24)  - Surveillance fungal cultures 4/10 NGTD  - permacath removed 4/19    #UTI- urine culture from 4/11 growing klebsiella sensitive to ceftriaxone, and now bacteremia with GNR likely 2/2 klebsiella (in urine and lung).  - cs/p CTX course completion    CARDIOVASCULAR   # Hypotension  - c/w Midodrine 15 mg q8h to maintain SBP>65  - c/w fludrocortisone 0.1mg qd and prednisone 6 mg BID for oral regimen    # CHF with EF 10-15% 2/2 ischemic cardiomyopathy s/p AICD. Elevated BNP on admission with R sided effusion and edema. Patient with persistent pleural effusions on CXR and US and b/l dependent edema.   - Maintain negative fluid balance with dialysis  - c/w holding ACE/BB in setting of sepsis/ hypotension on midodrine    #AFIB- hx of Afib and LV thrombus on coumadin prior to admission; TTE with definity shows no LV thrombus currently   - c/w Heparin gtt bridging to coumadin  - c/w holding Metoprolol 12.5 q12h given hypotension. Will use Dig for rate control if RVR  - HR goal <110    #LV thrombus: RUKHSANA on 4/10 showing obvious thrombus in LV  -c/w hep gtt bridging to coumadin beginning 4/25    #CAD- Hx of MI and ischemic CM s/p AICD, STEMI 7/2017, was started on Aspirin and Brilinta per records from [place]  - c/w aspirin 81 and Atorvastatin 80mg qhs.     #LE Edema- LE edema with chronic venous stasis. Duplex does not show DVT    PULMONARY   #Chronic Resp failure- S/p tracheostomy 4/5. Bedside US with simple b/l pleural effusions and atelectatic lung. Less concern for infectious source given b/l effusions with no septations/loculations. Likely related to fluid overload. If clinically worsening, can consider thoracentesis for fluid studies and cultures.  - c/w daily CPAP trials, weaning to trach collar as tolerated  - c/w HD to remove excess fluid  - HD last 4/26  -next session 4/28    RENAL   #Chronic renal failure- likely ischemic ATN in setting of sepsis with low intravascular volume. Unknown baseline Cr presenting with Cr 3.93 with metabolic derangements. Renal team on board, now on intermittent HD. HD catheter removed 4/8 due to fungemia, now with Permacath removed 4/19 and replaced with L subclavian HD cath.   - Last HD 4/26, c/w intermittent HD per renal recs  - new permacath placement 4/25  - patient has NOT required albumin on HD    #Adrenal Insufficiency- BP improved immediately after hydrocortisone and able to wean off levophed  - c/w fludrocortisone and prednisone for PO regimen    #Elevated AG- Fluctuating at low level; lactate negative, glucose has been well controlled, possibly 2/2 uremia in setting of ESRD.   - Monitor BMP    #Hyperkalemia- PT with intermittently elevated K,  no EKG changes.  - Trend K   - c/w dialysis   - Kayexalate PRN    GI   #PEG in place- C/D/I, holding tube feeds in setting of new aspiration PNA and abdominal distension  #Diarrhea likely 2/2 tube feeds, C.diff ruled out. Improving. Rectal tube removed    HEME  #Thrombocytopenia- Resolved.       ENDOCRINE   #Diabetes- HbA1C 8.6.  - c/w regular insulin 7 units q6  - c/w lantus 21u qhs and adjust as needed  - MISS  - monitor FSG and adjust as needed       MSK  #LUE Swelling- noted to have eschar with surrounding edema which is tense, + fluctuance and enlarging.   - LUE US showing soft tissue swelling with no fluid colleciton  - ortho rec no intervention, supportive care    F: none  E: Replete K<4 Mg<2, caution in setting of acute renal failure  N: PEG Placed 4/4, Vital 1.5 given diarrhea  DVT ppx: on heparin bridging to coumadin  GI ppx: PPI 40 daily  Lines: L subclavian HD cath placed 4/19 and removed 4/25, R permacath placed 4/25  Drips: none  Full code  Dispo: MICU

## 2018-04-27 NOTE — PROVIDER CONTACT NOTE (OTHER) - SITUATION
Tele tech called to say pt had 6 beats of v tach, 6 seconds. Tele tech called to say pt had 10 beats of v tach, 6 seconds.

## 2018-04-27 NOTE — PROCEDURE NOTE - NSPROCNAME_GEN_A_CORE
Arterial Puncture/Cannulation
Arterial Puncture/Cannulation
Central Line Insertion
PICC Line Insertion
Tracheal Intubation
Central Line Insertion
Arterial Puncture/Cannulation
Central Line Insertion

## 2018-04-27 NOTE — PROVIDER CONTACT NOTE (CRITICAL VALUE NOTIFICATION) - ASSESSMENT
no acute changes, no tall tented T waves
Pt is weak, lethargic. On Levophed max dose.
lethargic, baseline
no acute changes, no tall tented T waves noted

## 2018-04-27 NOTE — PROCEDURE NOTE - NSPERFORMEDBY_GEN_A_CORE
Myself
Myself/Dr. Salazar
PA/teri gruber
Myself
Myself
Myself/Fellow

## 2018-04-27 NOTE — PROGRESS NOTE ADULT - SUBJECTIVE AND OBJECTIVE BOX
PROGRESS NOTE    CC: Septic shock,   Overnight Events: Agitated pulling out at trach, given seroquel, put on R side wrist restraint.  Interval History: More lethargic, not following commands. Unable to assess.  ROS: Unable to assess as above.    OBJECTIVE  Vitals:  T(C): 36.5 (04-27-18 @ 05:00), Max: 37.6 (04-26-18 @ 23:12)  HR: 96 (04-27-18 @ 04:44) (96 - 116)  BP: 90/62 (04-27-18 @ 04:44) (88/60 - 114/82)  RR: 18 (04-27-18 @ 04:44) (18 - 23)  SpO2: 100% (04-27-18 @ 04:44) (98% - 100%)  Wt(kg): --  Mode: AC/ CMV (Assist Control/ Continuous Mandatory Ventilation)  RR (machine): 10  TV (machine): 500  FiO2: 40  PEEP: 5  ITime: 1  MAP: 8.5  PIP: 16    I/O:  I&O's Summary    25 Apr 2018 07:01  -  26 Apr 2018 07:00  --------------------------------------------------------  IN: 581 mL / OUT: 0 mL / NET: 581 mL    26 Apr 2018 07:01  -  27 Apr 2018 06:56  --------------------------------------------------------  IN: 0 mL / OUT: 1000 mL / NET: -1000 mL        PHYSICAL EXAM:  Appearance: NAD. Speaking in full sentences.   HEENT: PERRL. Conjunctiva clear b/l. Moist oral mucosa.  NECK: Trach in place, no noted drainage or erythema.   Cardiovascular: Irregularly irregular, S1, S2 with no murmurs.  Respiratory: Lungs with some intermittent rhonchi with decreased breath sounds at bases.   Gastrointestinal:  Soft, nontender. Mild distension, PEG in place. Non-rigid.	  Extremities: 1+ edema b/l. Leathery/wrinkled skin. No erythema b/l. LE WWP b/l.  Vascular: DP diminished.  Neurologic:  More lethargic this AM. Not following commands. Opens eyes to painful stimuli. Moving RUE. Withdraws to pain on b/l LE, RUE >LUE.   	  LABS:                        6.7    14.1  )-----------( 228      ( 27 Apr 2018 05:41 )             22.5     04-27    135  |  94<L>  |  41<H>  ----------------------------<  183<H>  4.3   |  23  |  3.04<H>    Ca    8.6      27 Apr 2018 05:40  Phos  6.7     04-26  Mg     1.9     04-27      PT/INR - ( 27 Apr 2018 05:42 )   PT: 16.4 sec;   INR: 1.47          PTT - ( 27 Apr 2018 05:42 )  PTT:77.0 sec      RADIOLOGY & ADDITIONAL TESTS:  Reviewed .    MEDICATIONS  (STANDING):  aspirin  chewable 81 milliGRAM(s) Oral daily  atorvastatin 80 milliGRAM(s) Oral at bedtime  chlorhexidine 0.12% Liquid 15 milliLiter(s) Swish and Spit two times a day  chlorhexidine 2% Cloths 1 Application(s) Topical daily  dextrose 5%. 1000 milliLiter(s) (50 mL/Hr) IV Continuous <Continuous>  dextrose 50% Injectable 12.5 Gram(s) IV Push once  dextrose 50% Injectable 25 Gram(s) IV Push once  dextrose 50% Injectable 25 Gram(s) IV Push once  ergocalciferol Drops 6000 Unit(s) Oral daily  escitalopram 5 milliGRAM(s) Oral daily  fludroCORTISONE 0.1 milliGRAM(s) Oral daily  folic acid 1 milliGRAM(s) Oral daily  heparin  Infusion 1600 Unit(s)/Hr (16 mL/Hr) IV Continuous <Continuous>  insulin glargine Injectable (LANTUS) 21 Unit(s) SubCutaneous at bedtime  insulin regular  human corrective regimen sliding scale   SubCutaneous every 6 hours  insulin regular  human recombinant 7 Unit(s) SubCutaneous every 6 hours  levETIRAcetam  Solution 250 milliGRAM(s) Oral <User Schedule>  levETIRAcetam  Solution 500 milliGRAM(s) Oral every 24 hours  midodrine 15 milliGRAM(s) Oral every 8 hours  modafinil 100 milliGRAM(s) Oral <User Schedule>  multivitamin 1 Tablet(s) Oral daily  pantoprazole   Suspension 40 milliGRAM(s) Oral daily  predniSONE   Tablet 6 milliGRAM(s) Oral every 12 hours  psyllium Powder 1 Packet(s) Oral daily  silver sulfADIAZINE 1% Cream 1 Application(s) Topical daily  thiamine 100 milliGRAM(s) Oral daily    MEDICATIONS  (PRN):  acetaminophen    Suspension 650 milliGRAM(s) Oral every 6 hours PRN For Temp greater than 38 C (100.4 F)  acetaminophen    Suspension. 650 milliGRAM(s) Oral every 6 hours PRN Moderate Pain (4 - 6)  dextrose Gel 1 Dose(s) Oral once PRN Blood Glucose LESS THAN 70 milliGRAM(s)/deciliter  glucagon  Injectable 1 milliGRAM(s) IntraMuscular once PRN Glucose LESS THAN 70 milligrams/deciliter      ASSESSMENT:    PLAN: PROGRESS NOTE/Transfer [facility] to [facility] PGY-1    Hospital Course        CC: Septic shock,   Overnight Events: Agitated pulling out at trach, given seroquel, put on R side wrist restraint.  Interval History: More lethargic, not following commands. Unable to assess.  ROS: Unable to assess as above.    OBJECTIVE  Vitals:  T(C): 36.5 (04-27-18 @ 05:00), Max: 37.6 (04-26-18 @ 23:12)  HR: 96 (04-27-18 @ 04:44) (96 - 116)  BP: 90/62 (04-27-18 @ 04:44) (88/60 - 114/82)  RR: 18 (04-27-18 @ 04:44) (18 - 23)  SpO2: 100% (04-27-18 @ 04:44) (98% - 100%)  Wt(kg): --  Mode: AC/ CMV (Assist Control/ Continuous Mandatory Ventilation)  RR (machine): 10  TV (machine): 500  FiO2: 40  PEEP: 5  ITime: 1  MAP: 8.5  PIP: 16    I/O:  I&O's Summary    25 Apr 2018 07:01  -  26 Apr 2018 07:00  --------------------------------------------------------  IN: 581 mL / OUT: 0 mL / NET: 581 mL    26 Apr 2018 07:01  -  27 Apr 2018 06:56  --------------------------------------------------------  IN: 0 mL / OUT: 1000 mL / NET: -1000 mL        PHYSICAL EXAM:  Appearance: NAD. Speaking in full sentences.   HEENT: PERRL. Conjunctiva clear b/l. Moist oral mucosa.  NECK: Trach in place, no noted drainage or erythema.   Cardiovascular: Irregularly irregular, S1, S2 with no murmurs.  Respiratory: Lungs with some intermittent rhonchi with decreased breath sounds at bases.   Gastrointestinal:  Soft, nontender. Mild distension, PEG in place. Non-rigid.	  Extremities: 1+ edema b/l. Leathery/wrinkled skin. No erythema b/l. LE WWP b/l.  Vascular: DP diminished.  Neurologic:  More lethargic this AM. Not following commands. Opens eyes to painful stimuli. Moving RUE. Withdraws to pain on b/l LE, RUE >LUE.   	  LABS:                        6.7    14.1  )-----------( 228      ( 27 Apr 2018 05:41 )             22.5     04-27    135  |  94<L>  |  41<H>  ----------------------------<  183<H>  4.3   |  23  |  3.04<H>    Ca    8.6      27 Apr 2018 05:40  Phos  6.7     04-26  Mg     1.9     04-27      PT/INR - ( 27 Apr 2018 05:42 )   PT: 16.4 sec;   INR: 1.47          PTT - ( 27 Apr 2018 05:42 )  PTT:77.0 sec      RADIOLOGY & ADDITIONAL TESTS:  Reviewed .    MEDICATIONS  (STANDING):  aspirin  chewable 81 milliGRAM(s) Oral daily  atorvastatin 80 milliGRAM(s) Oral at bedtime  chlorhexidine 0.12% Liquid 15 milliLiter(s) Swish and Spit two times a day  chlorhexidine 2% Cloths 1 Application(s) Topical daily  dextrose 5%. 1000 milliLiter(s) (50 mL/Hr) IV Continuous <Continuous>  dextrose 50% Injectable 12.5 Gram(s) IV Push once  dextrose 50% Injectable 25 Gram(s) IV Push once  dextrose 50% Injectable 25 Gram(s) IV Push once  ergocalciferol Drops 6000 Unit(s) Oral daily  escitalopram 5 milliGRAM(s) Oral daily  fludroCORTISONE 0.1 milliGRAM(s) Oral daily  folic acid 1 milliGRAM(s) Oral daily  heparin  Infusion 1600 Unit(s)/Hr (16 mL/Hr) IV Continuous <Continuous>  insulin glargine Injectable (LANTUS) 21 Unit(s) SubCutaneous at bedtime  insulin regular  human corrective regimen sliding scale   SubCutaneous every 6 hours  insulin regular  human recombinant 7 Unit(s) SubCutaneous every 6 hours  levETIRAcetam  Solution 250 milliGRAM(s) Oral <User Schedule>  levETIRAcetam  Solution 500 milliGRAM(s) Oral every 24 hours  midodrine 15 milliGRAM(s) Oral every 8 hours  modafinil 100 milliGRAM(s) Oral <User Schedule>  multivitamin 1 Tablet(s) Oral daily  pantoprazole   Suspension 40 milliGRAM(s) Oral daily  predniSONE   Tablet 6 milliGRAM(s) Oral every 12 hours  psyllium Powder 1 Packet(s) Oral daily  silver sulfADIAZINE 1% Cream 1 Application(s) Topical daily  thiamine 100 milliGRAM(s) Oral daily    MEDICATIONS  (PRN):  acetaminophen    Suspension 650 milliGRAM(s) Oral every 6 hours PRN For Temp greater than 38 C (100.4 F)  acetaminophen    Suspension. 650 milliGRAM(s) Oral every 6 hours PRN Moderate Pain (4 - 6)  dextrose Gel 1 Dose(s) Oral once PRN Blood Glucose LESS THAN 70 milliGRAM(s)/deciliter  glucagon  Injectable 1 milliGRAM(s) IntraMuscular once PRN Glucose LESS THAN 70 milligrams/deciliter      ASSESSMENT:    PLAN: PROGRESS NOTE/Transfer [facility] to [facility] PGY-1    Hospital Course  63M PMH HFrEF 10-15% (ischemic), CAD (STEMI per [facility] records 7/17), s/p AICD, Afib, HTN, DM2 on insulin, CKD, and gout admitted for septic shock 2/2 LE cellulitis as well as concern for ATN in setting of shock. Pt was started on levophed for hypotension. Dobutamine given low mix venous saturation with concern for component of cardiogenic shock however had to be discontinued given tachycardia.  Pt also experienced tachycardia on Milrinone. Pt was started on Vanco/ Zosyn and completed total 11 days of abx. PT was started on solucortef 50 q6h given recent hx of steroid use. Gallium scan demosntrated LLE  cellulitis. TTE with no vegetations. Given worsening GILMER and low UOP, renal was consulted. HD cath was placed and pt was started on CVVD and eventually transitioned to intermittent HD. Pt was noted have b/l pulmonary effusion and required R chest tube placement with fluid studies showing exudative process. Given extensive cardiac hx and component of cardiogenic shock, cardiology was consulted. Pt was started on Vasopressin to improve blood pressure. Pt also stated on Isordil and Hydralazine but pt was not  able to tolerate the medications. Pt was started  on Hep gtt for Afib but switched to Argatroban given possibility of HIT. HIT was ruled out and patient was bridged to coumadin. Pt was stable for transfer to CCU for further management of cardiogenic shock however on 3/22, pt became unresponsive post A line placement. Vital signs were normal during event. Stroke code was called and he was intubated for airway protection. CT was negative MRI after 4 days noted to have chronic infarcts with possible small brainstem stroke per neurology read. He was started on Modafinil however mental status did not improve significantly. VEEG significant for slowing but no seizures. After GOC discussions with family patient had trach and PEG on 4/4 with plans to be discharged to [facility] when stable. On 4/8 patient was found to be fungemic with candida tropicalis and started on micafungin, then switched to fluconazole on 4/13 when sensitivities resulted. RUKHSANA was negative for vegetations or infection of AICD. CT A/P performed given transamnitis/ elevated bili but source of candidemia remains unclear. HD Permacath was removed 4/8 and was replaced on 4/12 when surveillance cultures had been negative for 48 hours. However overnight on 4/12 patient spiked fever again and blood, urine and sputum cultures returned positive for Klebsiella. He was started on meropenem for 1 day, then switched to Zosyn for 2 days and finally placed on ceftriaxone when sensitivities returned on 4/15. Patient restarted on hydrocortisone in attempt to wean off pressors. Thrombocytopenia resolved and patient restarted on heparin gtt for A-fib. Given continued low-grade fevers and uptrending white count, permacath removed on 4/19 and replaced with temp HD cath. The following weekend, noticed pt with seizure-like activity and VEEG placed. Per neuro, started on keppra 500 mg qd with additional 250cc keppra on post-HD days. End goal of L-tachy placement.  Transitioned to PO prednisone and fludrocortisone to replace hydrocortisone. L-tachy will not accept albumin pushes, and goal to wean patient off albumin during HD for hypotension. In addition, pt to have repeat MRI for prognostication of CVA. Pt also noticed to have L forearm eschar that has enlarged and increasing fluctuance. LUE US ordered also showing R superficial thrombus; reordered soft tissue US to eval for abscess in L forearm. Ortho for hand consulted for possible I&D- ultrasound demonstrated no drainage fluid- soft tissue for which recommending nothing to do. Albumin was weaned off during yesterday's dialysis session. Warfarin was dosed and remains subtherapeutic and remains on hep gtt.     O/n patient noted to become more agitated and pulling at the trach for which patient given seroquel and wrist restraints. Also noted to have SBP in 80s for which patient given 500cc bolus. On AM labs this AM patient noted to have Hgb of 6.7 from 7.4- no bloody output noted. Given 1UPRBC. Of noted, some abdominal distension this AM. After rounds, patient noted to be coughing up feeds/secretions- 100cc residuals noted. Aggressively suctioned. Patient started to become tachycardiac to HR 120s. CXR performed demonstrating new R lower lobe. During work up patient noted to have vent disconnected and concern for secretions and loose trach. Pulm fellow called to bedside and ICU seniors to bedside. ABG drawn and determined to warrant further HD/resp monitoring in MICU.     Interval History: More lethargic this AM, not following commands. Unable to assess. Not answering questions, minimal eye opening to sternal rub/pain.  ROS: Unable to assess as above.    OBJECTIVE  Vitals:  T(C): 36.5 (04-27-18 @ 05:00), Max: 37.6 (04-26-18 @ 23:12)  HR: 96 (04-27-18 @ 04:44) (96 - 116)  BP: 90/62 (04-27-18 @ 04:44) (88/60 - 114/82)  RR: 18 (04-27-18 @ 04:44) (18 - 23)  SpO2: 100% (04-27-18 @ 04:44) (98% - 100%)  Wt(kg): --  Mode: AC/ CMV (Assist Control/ Continuous Mandatory Ventilation)  RR (machine): 10  TV (machine): 500  FiO2: 40  PEEP: 5  ITime: 1  MAP: 8.5  PIP: 16    I/O:  I&O's Summary    25 Apr 2018 07:01  -  26 Apr 2018 07:00  --------------------------------------------------------  IN: 581 mL / OUT: 0 mL / NET: 581 mL    26 Apr 2018 07:01  -  27 Apr 2018 06:56  --------------------------------------------------------  IN: 0 mL / OUT: 1000 mL / NET: -1000 mL        PHYSICAL EXAM:  Appearance: NAD. Speaking in full sentences.   HEENT: PERRL. Conjunctiva clear b/l. Moist oral mucosa.  NECK: Trach in place, no noted drainage or erythema.   Cardiovascular: Irregularly irregular, S1, S2 with no murmurs.  Respiratory: Lungs with some intermittent rhonchi with decreased breath sounds at bases.   Gastrointestinal:  Soft, nontender. Mild distension, PEG in place. Non-rigid.	  Extremities: 1+ edema b/l. Leathery/wrinkled skin. No erythema b/l. LE WWP b/l.  Vascular: DP diminished.  Neurologic:  More lethargic this AM. Not following commands. Opens eyes to painful stimuli. Moving RUE. Withdraws to pain on b/l LE, RUE >LUE.   	  LABS:                        6.7    14.1  )-----------( 228      ( 27 Apr 2018 05:41 )             22.5     04-27    135  |  94<L>  |  41<H>  ----------------------------<  183<H>  4.3   |  23  |  3.04<H>    Ca    8.6      27 Apr 2018 05:40  Phos  6.7     04-26  Mg     1.9     04-27      PT/INR - ( 27 Apr 2018 05:42 )   PT: 16.4 sec;   INR: 1.47          PTT - ( 27 Apr 2018 05:42 )  PTT:77.0 sec      RADIOLOGY & ADDITIONAL TESTS:  Reviewed .    MEDICATIONS  (STANDING):  aspirin  chewable 81 milliGRAM(s) Oral daily  atorvastatin 80 milliGRAM(s) Oral at bedtime  chlorhexidine 0.12% Liquid 15 milliLiter(s) Swish and Spit two times a day  chlorhexidine 2% Cloths 1 Application(s) Topical daily  dextrose 5%. 1000 milliLiter(s) (50 mL/Hr) IV Continuous <Continuous>  dextrose 50% Injectable 12.5 Gram(s) IV Push once  dextrose 50% Injectable 25 Gram(s) IV Push once  dextrose 50% Injectable 25 Gram(s) IV Push once  ergocalciferol Drops 6000 Unit(s) Oral daily  escitalopram 5 milliGRAM(s) Oral daily  fludroCORTISONE 0.1 milliGRAM(s) Oral daily  folic acid 1 milliGRAM(s) Oral daily  heparin  Infusion 1600 Unit(s)/Hr (16 mL/Hr) IV Continuous <Continuous>  insulin glargine Injectable (LANTUS) 21 Unit(s) SubCutaneous at bedtime  insulin regular  human corrective regimen sliding scale   SubCutaneous every 6 hours  insulin regular  human recombinant 7 Unit(s) SubCutaneous every 6 hours  levETIRAcetam  Solution 250 milliGRAM(s) Oral <User Schedule>  levETIRAcetam  Solution 500 milliGRAM(s) Oral every 24 hours  midodrine 15 milliGRAM(s) Oral every 8 hours  modafinil 100 milliGRAM(s) Oral <User Schedule>  multivitamin 1 Tablet(s) Oral daily  pantoprazole   Suspension 40 milliGRAM(s) Oral daily  predniSONE   Tablet 6 milliGRAM(s) Oral every 12 hours  psyllium Powder 1 Packet(s) Oral daily  silver sulfADIAZINE 1% Cream 1 Application(s) Topical daily  thiamine 100 milliGRAM(s) Oral daily    MEDICATIONS  (PRN):  acetaminophen    Suspension 650 milliGRAM(s) Oral every 6 hours PRN For Temp greater than 38 C (100.4 F)  acetaminophen    Suspension. 650 milliGRAM(s) Oral every 6 hours PRN Moderate Pain (4 - 6)  dextrose Gel 1 Dose(s) Oral once PRN Blood Glucose LESS THAN 70 milliGRAM(s)/deciliter  glucagon  Injectable 1 milliGRAM(s) IntraMuscular once PRN Glucose LESS THAN 70 milligrams/deciliter

## 2018-04-27 NOTE — PROGRESS NOTE ADULT - ASSESSMENT
63M PMH HFrEF 10-15% (ischemic), MI, s/p AICD vs PPM, possible Afib, HTN, DM2 on insulin, possible CKD, and gout, who presented with a chief complaint of generalized weakness and admitted to MICU for septic shock likely 2/2 LE cellulitis  as well as ATN requiring hemodialysis. Course  complicated by altered mental status likely secondary to acute brainstem infarct. Course also complicated by candidemia (resolving) and sepsis 2/2 klebsiella bacteremia. He was initially treated with meropenem, switched to zosyn, narrowed to ceftriaxone with sensitivites. Hydrocortisone was discontinued due to infection. Course further complicated by hypotension requiring levophed and has been transitioned to midodrine 15mg TID and albumin during HD. Patient transferred to [facility] after weaned off pressors. Patient has been weaned off albumin during HD. Of note patient noted to be coughing up feeds and secretions out of trach, patient becoming more tachycardiac and tachypneic with concern for potential aspiration. Patient transferred back to MICU for further HD monitoring.

## 2018-04-28 LAB
ANION GAP SERPL CALC-SCNC: 25 MMOL/L — HIGH (ref 5–17)
APTT BLD: 101 SEC — HIGH (ref 27.5–37.4)
APTT BLD: 74.5 SEC — HIGH (ref 27.5–37.4)
APTT BLD: 80.4 SEC — HIGH (ref 27.5–37.4)
APTT BLD: 96.1 SEC — HIGH (ref 27.5–37.4)
BUN SERPL-MCNC: 48 MG/DL — HIGH (ref 7–23)
CALCIUM SERPL-MCNC: 9.2 MG/DL — SIGNIFICANT CHANGE UP (ref 8.4–10.5)
CHLORIDE SERPL-SCNC: 92 MMOL/L — LOW (ref 96–108)
CO2 SERPL-SCNC: 20 MMOL/L — LOW (ref 22–31)
CREAT SERPL-MCNC: 3.61 MG/DL — HIGH (ref 0.5–1.3)
GLUCOSE BLDC GLUCOMTR-MCNC: 110 MG/DL — HIGH (ref 70–99)
GLUCOSE BLDC GLUCOMTR-MCNC: 132 MG/DL — HIGH (ref 70–99)
GLUCOSE BLDC GLUCOMTR-MCNC: 157 MG/DL — HIGH (ref 70–99)
GLUCOSE BLDC GLUCOMTR-MCNC: 164 MG/DL — HIGH (ref 70–99)
GLUCOSE BLDC GLUCOMTR-MCNC: 167 MG/DL — HIGH (ref 70–99)
GLUCOSE SERPL-MCNC: 151 MG/DL — HIGH (ref 70–99)
GRAM STN FLD: SIGNIFICANT CHANGE UP
HCT VFR BLD CALC: 29.8 % — LOW (ref 39–50)
HGB BLD-MCNC: 9.5 G/DL — LOW (ref 13–17)
INR BLD: 1.76 — HIGH (ref 0.88–1.16)
LACTATE SERPL-SCNC: 2.1 MMOL/L — HIGH (ref 0.5–2)
LACTATE SERPL-SCNC: 2.8 MMOL/L — HIGH (ref 0.5–2)
LACTATE SERPL-SCNC: 3.7 MMOL/L — HIGH (ref 0.5–2)
LACTATE SERPL-SCNC: 4.6 MMOL/L — CRITICAL HIGH (ref 0.5–2)
LYMPHOCYTES # BLD AUTO: 3 % — LOW (ref 13–44)
MAGNESIUM SERPL-MCNC: 2 MG/DL — SIGNIFICANT CHANGE UP (ref 1.6–2.6)
MCHC RBC-ENTMCNC: 29.5 PG — SIGNIFICANT CHANGE UP (ref 27–34)
MCHC RBC-ENTMCNC: 31.9 G/DL — LOW (ref 32–36)
MCV RBC AUTO: 92.5 FL — SIGNIFICANT CHANGE UP (ref 80–100)
MONOCYTES NFR BLD AUTO: 3 % — SIGNIFICANT CHANGE UP (ref 2–14)
NEUTROPHILS NFR BLD AUTO: 87 % — HIGH (ref 43–77)
PHOSPHATE SERPL-MCNC: 7.2 MG/DL — HIGH (ref 2.5–4.5)
PLATELET # BLD AUTO: 259 K/UL — SIGNIFICANT CHANGE UP (ref 150–400)
POTASSIUM SERPL-MCNC: 4.8 MMOL/L — SIGNIFICANT CHANGE UP (ref 3.5–5.3)
POTASSIUM SERPL-SCNC: 4.8 MMOL/L — SIGNIFICANT CHANGE UP (ref 3.5–5.3)
PROTHROM AB SERPL-ACNC: 19.8 SEC — HIGH (ref 9.8–12.7)
RBC # BLD: 3.22 M/UL — LOW (ref 4.2–5.8)
RBC # FLD: 18.2 % — HIGH (ref 10.3–16.9)
SODIUM SERPL-SCNC: 137 MMOL/L — SIGNIFICANT CHANGE UP (ref 135–145)
VANCOMYCIN TROUGH SERPL-MCNC: 15 UG/ML — SIGNIFICANT CHANGE UP (ref 10–20)
WBC # BLD: 42.2 K/UL — CRITICAL HIGH (ref 3.8–10.5)
WBC # FLD AUTO: 42.2 K/UL — CRITICAL HIGH (ref 3.8–10.5)

## 2018-04-28 PROCEDURE — 71045 X-RAY EXAM CHEST 1 VIEW: CPT | Mod: 26

## 2018-04-28 PROCEDURE — 74019 RADEX ABDOMEN 2 VIEWS: CPT | Mod: 26

## 2018-04-28 PROCEDURE — 99233 SBSQ HOSP IP/OBS HIGH 50: CPT

## 2018-04-28 PROCEDURE — 99233 SBSQ HOSP IP/OBS HIGH 50: CPT | Mod: GC

## 2018-04-28 RX ORDER — VANCOMYCIN HCL 1 G
1250 VIAL (EA) INTRAVENOUS DAILY
Qty: 0 | Refills: 0 | Status: DISCONTINUED | OUTPATIENT
Start: 2018-04-28 | End: 2018-04-28

## 2018-04-28 RX ORDER — HEPARIN SODIUM 5000 [USP'U]/ML
1500 INJECTION INTRAVENOUS; SUBCUTANEOUS
Qty: 25000 | Refills: 0 | Status: DISCONTINUED | OUTPATIENT
Start: 2018-04-28 | End: 2018-04-30

## 2018-04-28 RX ORDER — SODIUM CHLORIDE 9 MG/ML
750 INJECTION INTRAMUSCULAR; INTRAVENOUS; SUBCUTANEOUS ONCE
Qty: 0 | Refills: 0 | Status: DISCONTINUED | OUTPATIENT
Start: 2018-04-28 | End: 2018-04-28

## 2018-04-28 RX ORDER — ERYTHROPOIETIN 10000 [IU]/ML
7000 INJECTION, SOLUTION INTRAVENOUS; SUBCUTANEOUS ONCE
Qty: 0 | Refills: 0 | Status: COMPLETED | OUTPATIENT
Start: 2018-04-28 | End: 2018-04-28

## 2018-04-28 RX ORDER — DOXERCALCIFEROL 2.5 UG/1
2 CAPSULE ORAL ONCE
Qty: 0 | Refills: 0 | Status: COMPLETED | OUTPATIENT
Start: 2018-04-28 | End: 2018-04-28

## 2018-04-28 RX ORDER — VANCOMYCIN HCL 1 G
1250 VIAL (EA) INTRAVENOUS ONCE
Qty: 0 | Refills: 0 | Status: COMPLETED | OUTPATIENT
Start: 2018-04-28 | End: 2018-04-28

## 2018-04-28 RX ORDER — SODIUM CHLORIDE 9 MG/ML
1000 INJECTION INTRAMUSCULAR; INTRAVENOUS; SUBCUTANEOUS ONCE
Qty: 0 | Refills: 0 | Status: COMPLETED | OUTPATIENT
Start: 2018-04-28 | End: 2018-04-28

## 2018-04-28 RX ORDER — METOCLOPRAMIDE HCL 10 MG
5 TABLET ORAL EVERY 6 HOURS
Qty: 0 | Refills: 0 | Status: DISCONTINUED | OUTPATIENT
Start: 2018-04-28 | End: 2018-05-16

## 2018-04-28 RX ORDER — METOCLOPRAMIDE HCL 10 MG
5 TABLET ORAL EVERY 8 HOURS
Qty: 0 | Refills: 0 | Status: DISCONTINUED | OUTPATIENT
Start: 2018-04-28 | End: 2018-04-28

## 2018-04-28 RX ADMIN — Medication 60 MILLILITER(S): at 06:05

## 2018-04-28 RX ADMIN — INSULIN HUMAN 7 UNIT(S): 100 INJECTION, SOLUTION SUBCUTANEOUS at 12:41

## 2018-04-28 RX ADMIN — INSULIN HUMAN 2: 100 INJECTION, SOLUTION SUBCUTANEOUS at 12:41

## 2018-04-28 RX ADMIN — LEVETIRACETAM 500 MILLIGRAM(S): 250 TABLET, FILM COATED ORAL at 12:42

## 2018-04-28 RX ADMIN — Medication 50 MILLIGRAM(S): at 06:03

## 2018-04-28 RX ADMIN — INSULIN HUMAN 7 UNIT(S): 100 INJECTION, SOLUTION SUBCUTANEOUS at 00:00

## 2018-04-28 RX ADMIN — Medication 50 MILLIGRAM(S): at 18:11

## 2018-04-28 RX ADMIN — HEPARIN SODIUM 15 UNIT(S)/HR: 5000 INJECTION INTRAVENOUS; SUBCUTANEOUS at 06:06

## 2018-04-28 RX ADMIN — Medication 5 MILLIGRAM(S): at 18:11

## 2018-04-28 RX ADMIN — HEPARIN SODIUM 15 UNIT(S)/HR: 5000 INJECTION INTRAVENOUS; SUBCUTANEOUS at 07:08

## 2018-04-28 RX ADMIN — SODIUM CHLORIDE 4000 MILLILITER(S): 9 INJECTION INTRAMUSCULAR; INTRAVENOUS; SUBCUTANEOUS at 04:30

## 2018-04-28 RX ADMIN — MIDODRINE HYDROCHLORIDE 15 MILLIGRAM(S): 2.5 TABLET ORAL at 06:04

## 2018-04-28 RX ADMIN — ERYTHROPOIETIN 7000 UNIT(S): 10000 INJECTION, SOLUTION INTRAVENOUS; SUBCUTANEOUS at 11:47

## 2018-04-28 RX ADMIN — MODAFINIL 100 MILLIGRAM(S): 200 TABLET ORAL at 07:14

## 2018-04-28 RX ADMIN — PANTOPRAZOLE SODIUM 40 MILLIGRAM(S): 20 TABLET, DELAYED RELEASE ORAL at 12:42

## 2018-04-28 RX ADMIN — INSULIN HUMAN 2: 100 INJECTION, SOLUTION SUBCUTANEOUS at 18:12

## 2018-04-28 RX ADMIN — Medication 1 TABLET(S): at 12:42

## 2018-04-28 RX ADMIN — ATORVASTATIN CALCIUM 80 MILLIGRAM(S): 80 TABLET, FILM COATED ORAL at 22:09

## 2018-04-28 RX ADMIN — ERGOCALCIFEROL 6000 UNIT(S): 1.25 CAPSULE ORAL at 12:42

## 2018-04-28 RX ADMIN — MIDODRINE HYDROCHLORIDE 15 MILLIGRAM(S): 2.5 TABLET ORAL at 22:09

## 2018-04-28 RX ADMIN — PIPERACILLIN AND TAZOBACTAM 200 GRAM(S): 4; .5 INJECTION, POWDER, LYOPHILIZED, FOR SOLUTION INTRAVENOUS at 15:35

## 2018-04-28 RX ADMIN — CHLORHEXIDINE GLUCONATE 15 MILLILITER(S): 213 SOLUTION TOPICAL at 22:09

## 2018-04-28 RX ADMIN — Medication 1 MILLIGRAM(S): at 12:42

## 2018-04-28 RX ADMIN — CHLORHEXIDINE GLUCONATE 1 APPLICATION(S): 213 SOLUTION TOPICAL at 07:16

## 2018-04-28 RX ADMIN — DOXERCALCIFEROL 2 MICROGRAM(S): 2.5 CAPSULE ORAL at 11:47

## 2018-04-28 RX ADMIN — Medication 50 MILLIGRAM(S): at 12:41

## 2018-04-28 RX ADMIN — MODAFINIL 100 MILLIGRAM(S): 200 TABLET ORAL at 17:28

## 2018-04-28 RX ADMIN — INSULIN HUMAN 7 UNIT(S): 100 INJECTION, SOLUTION SUBCUTANEOUS at 18:12

## 2018-04-28 RX ADMIN — Medication 1 APPLICATION(S): at 12:45

## 2018-04-28 RX ADMIN — Medication 50 MILLIGRAM(S): at 00:20

## 2018-04-28 RX ADMIN — PIPERACILLIN AND TAZOBACTAM 200 GRAM(S): 4; .5 INJECTION, POWDER, LYOPHILIZED, FOR SOLUTION INTRAVENOUS at 06:04

## 2018-04-28 RX ADMIN — PIPERACILLIN AND TAZOBACTAM 200 GRAM(S): 4; .5 INJECTION, POWDER, LYOPHILIZED, FOR SOLUTION INTRAVENOUS at 22:09

## 2018-04-28 RX ADMIN — INSULIN HUMAN 2: 100 INJECTION, SOLUTION SUBCUTANEOUS at 07:07

## 2018-04-28 RX ADMIN — ESCITALOPRAM OXALATE 5 MILLIGRAM(S): 10 TABLET, FILM COATED ORAL at 12:41

## 2018-04-28 RX ADMIN — Medication 81 MILLIGRAM(S): at 12:42

## 2018-04-28 RX ADMIN — Medication 1 PACKET(S): at 12:45

## 2018-04-28 RX ADMIN — INSULIN HUMAN 7 UNIT(S): 100 INJECTION, SOLUTION SUBCUTANEOUS at 06:00

## 2018-04-28 RX ADMIN — CHLORHEXIDINE GLUCONATE 15 MILLILITER(S): 213 SOLUTION TOPICAL at 12:48

## 2018-04-28 RX ADMIN — Medication 100 MILLIGRAM(S): at 12:45

## 2018-04-28 RX ADMIN — LEVETIRACETAM 250 MILLIGRAM(S): 250 TABLET, FILM COATED ORAL at 18:11

## 2018-04-28 RX ADMIN — Medication 166.67 MILLIGRAM(S): at 17:37

## 2018-04-28 RX ADMIN — MIDODRINE HYDROCHLORIDE 15 MILLIGRAM(S): 2.5 TABLET ORAL at 14:28

## 2018-04-28 NOTE — PROGRESS NOTE ADULT - SUBJECTIVE AND OBJECTIVE BOX
Patient was seen and evaluated on dialysis.   Patient is tolerating the procedure well.   HR: 101 (04-28-18 @ 12:31)  BP: 85/68 (04-28-18 @ 12:00)  Continue dialysis:   Dialyzer: Revaclear 300          QB: 300        QD: 500 2K+  Goal UF 1L over 3 Hours

## 2018-04-28 NOTE — PROGRESS NOTE ADULT - PROBLEM SELECTOR PROBLEM 3
"ADHD FOLLOW UP VISIT      Date of Office Visit:  2017  Encounter Provider:  Callum Cherry MD  Place of Service:  [facility]  Patient Name: Bryant Green  :  2009    Subjective     Chief Complaint  Patient ID: Bryant Green is a 7  y.o. 8  m.o. female who is here with her father for a chief complaint of Attention-deficit disorder, combined type.    Guardian reports the following symptoms while ON medication:  Inattention:  1. Often fails to give attention to detail or makes careless mistakes: yes  2. Often has difficulty sustaining attention in tasks/play: yes  3. Often does not seem to listen when spoken to: yes  4. Often does not follow through on instructions and fails to finish tasks: yes  5. Often has difficulty organizing tasks/activities: yes  6. Often avoids tasks requiring sustained mental effort (e.g. homework): yes  7. Often loses things necessary for tasks: yes  8. Often distracted by extraneous stimuli: yes  9. Often forgetful: yes    Hyperactivity/ Impulsivity:  1. Often fidgets with hands or feet or squirms: yes  2. Often leaves seat in classroom or meal table: yes  3. Often runs about or climbs excessively in inappropriate situations: yes  4. Often has difficulty playing quietly: yes  5, Often \"on the go\" driven by a motor: yes  6. Often talks excessively: yes  7. Often blurts out answer before question completed: yes  8. Often has difficulty awaiting turn: yes  9. Often interrupts or intrudes on others: yes    History of Present Illness  Social History   Substance Use Topics   • Smoking status: Passive Smoke Exposure - Never Smoker   • Smokeless tobacco: Not on file   • Alcohol use Not on file     Social History     Social History Narrative    Lives with mom dad and 3 siblings. Mom smokes outside. 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       Chief Complaint   Patient presents with   • Follow-up     adhd       Adverse side effects noted: appetite " "suppression, headache and mood swings   The parent(s) report that performance and behavior are worsening  Patient reports: worsening  School status: Behavior improving.  Academic worsening  Teacher comments: none    Historical Data  Patient Active Problem List    Diagnosis   • Attention deficit hyperactivity disorder, combined type [F90.2]     Overview Note:     Age at diagnosis: 6  Diagnosis made by: our office  Diagnosis made via: Sharee Rating Scale  Date of last formal assessment: 6  Current ADHD Meds: Concerta 18mg  Daily, MPH 5 mg after school  Previous medications: methylphenidate 2.5 bid, 5 bid, 7.5 bid  Coexisting conditions: ODD symptoms, sleep problems  Services: none.     • Insomnia disorder with non-sleep disorder mental comorbidity [G47.00]     Overview Note:     suspect due to ADHD and stimulant use            Patient's medications and allergies were reviewed and updated as appropriate.    Review of Systems  Review of Systems   Constitutional: Positive for appetite change. Negative for fever and unexpected weight change.   HENT: Negative for congestion, hearing loss and rhinorrhea.    Eyes: Negative for visual disturbance.   Respiratory: Negative for cough, shortness of breath and wheezing.    Cardiovascular: Negative for chest pain and palpitations.   Gastrointestinal: Negative for abdominal pain.   Skin: Negative for rash.   Neurological: Negative for dizziness, syncope and headaches.   Psychiatric/Behavioral: Negative for behavioral problems and sleep disturbance.         Objective      Physical Exam:  Vitals:    01/16/17 1600   BP: 92/62   BP Location: Left arm   Patient Position: Sitting   Cuff Size: Pediatric   Pulse: 82   Temp: (!) 96.3 °F (35.7 °C)   TempSrc: Tympanic   SpO2: 97%   Weight: 52 lb (23.6 kg)   Height: 47\" (119.4 cm)     Physical Exam   Constitutional: Vital signs are normal. She appears well-developed and well-nourished. She is active.  Non-toxic appearance. No distress. "   HENT:   Head: Normocephalic and atraumatic.   Right Ear: Tympanic membrane normal.   Left Ear: Tympanic membrane normal.   Nose: Nose normal. No nasal discharge.   Mouth/Throat: Mucous membranes are moist. Dentition is normal. No dental caries. Oropharynx is clear.   Eyes: EOM and lids are normal. Visual tracking is normal. Right eye exhibits no nystagmus. Left eye exhibits no nystagmus.   Neck: Normal range of motion.   Cardiovascular: Normal rate, regular rhythm, S1 normal and S2 normal.    No murmur heard.  Pulmonary/Chest: Effort normal and breath sounds normal. There is normal air entry. No respiratory distress. Air movement is not decreased. She has no decreased breath sounds. She has no wheezes. She has no rhonchi. She has no rales.   Abdominal: Soft. Bowel sounds are normal. She exhibits no distension and no mass. There is no hepatosplenomegaly. There is no tenderness. There is no guarding.   Lymphadenopathy: No anterior cervical adenopathy or posterior cervical adenopathy. No supraclavicular adenopathy is present.   Neurological: She is alert. She has normal strength and normal reflexes. She displays normal reflexes. No cranial nerve deficit. Coordination and gait normal.   Skin: Skin is warm and dry. Capillary refill takes less than 3 seconds. No lesion and no rash noted.   Psychiatric: She has a normal mood and affect. Her speech is normal and behavior is normal. Judgment normal. Her affect is not labile. She is not aggressive and not hyperactive. She does not express inappropriate judgment.       Observation of Tyler behaviors in the exam room included no unusual activities.    Assessment/Plan     Tyler ADHD symptoms are not controlled at this time.   Diagnoses and all orders for this visit:    ADHD (attention deficit hyperactivity disorder), combined type  -     methylphenidate (RITALIN) 5 MG tablet; Give 1 tab every day after school  -     methylphenidate (CONCERTA) 27 MG CR tablet; Take 1  tablet by mouth Every Morning.    Insomnia disorder with non-sleep disorder mental comorbidity  Comments:  continue clonidine nightly as needed  Orders:  -     CloNIDine (CATAPRES) 0.1 MG tablet; Give 1 tablet 1 hour prior to bedtime    Eat breakfast prior to taking the morning medication, Keep a regular bedtime routine to include lights low and no electronics for 30 - 60 minutes prior to bedtime, Encourage protein-rich snacks after school ie:  peanut butter or other nuts, hard-boiled egg, milk or yogurt., Avoid all caffeine and sweetened drinks such as soda, koolaid, gatorade, juice, sweet tea.  Hydrate with low fat milk and water.    Return in about 1 month (around 2/16/2017).              Anemia due to chronic kidney disease

## 2018-04-28 NOTE — PROGRESS NOTE ADULT - PROBLEM SELECTOR PLAN 1
Oligoanuric GILMER due to ischemic ATN requiring RRT.    Plan:   Last HD on 4/26 with 1L UF. Tolerated procedure well.  Will do HD treatment for UF and clearance without albumin as per request.  Low K/low phos/renal feeding.  Intermittent bladder scan.

## 2018-04-28 NOTE — PROGRESS NOTE ADULT - PROBLEM SELECTOR PLAN 2
Acute on chronic systolic CHF with LVEF 15 % and severely low blood pressure  Please concentrate as many IV fluid drips as possible  Daily weight  Strict I/o  Intermittent bladder scan to assess urine.  Plan per primary/cardiology team.

## 2018-04-28 NOTE — PROGRESS NOTE ADULT - SUBJECTIVE AND OBJECTIVE BOX
PGY1 PROGRESS NOTE:    OVERNIGHT EVENTS: Lactate uptrended to 4.6. Gave total 1500 cc NS bolus. PTT was 96, decreased heparin gtt by 1 to 15ml/h.     SUBJECTIVE / INTERVAL HPI: Patient seen and examined at bedside.     VITAL SIGNS:  Vital Signs Last 24 Hrs  T(C): 37.1 (28 Apr 2018 01:00), Max: 38.5 (27 Apr 2018 14:52)  T(F): 98.7 (28 Apr 2018 01:00), Max: 101.3 (27 Apr 2018 14:52)  HR: 103 (28 Apr 2018 04:31) (94 - 123)  BP: 116/91 (28 Apr 2018 02:00) (67/48 - 186/157)  BP(mean): 126 (28 Apr 2018 01:00) (55 - 168)  RR: 22 (28 Apr 2018 04:31) (15 - 25)  SpO2: 100% (28 Apr 2018 04:31) (69% - 100%)    PHYSICAL EXAM:  General: NAD, no respiratory distress, laying comfortably in bed, responsive to verbal and tactile stimuli  HEENT: NC/AT; PERRL, clear conjunctiva  Neck: supple, trach in place, R permacath  Respiratory: b/l rhonchi in upper lobes anteriorly, no wheezing or rales, no retractions or labored breathing, on ventilatory support   Cardiovascular: +S1/S2; RRR, no M/R/G  Abdomen: soft, moderately distended, hypoactive BS, no TTP, no rebound or guarding,  PEG in place C/D/I  Extremities: WWP, 2+ pitting edema to level of thigh b/l, LUE with eschar on medial surface of forearm with surrounding tense edema, +fluctuance  Vasc: 2+ peripheral pulses b/l  Skin: dry, b/l LE with peeling skin, wrinkled/weeping skin of dorsal feet b/l  Neurological: nonverbal, intermittently following commands to open and close eyes, RUE flexed and rigid, LUE rigid, no spontaneous movement of b/l LE    MEDICATIONS:  MEDICATIONS  (STANDING):  aspirin  chewable 81 milliGRAM(s) Oral daily  atorvastatin 80 milliGRAM(s) Oral at bedtime  chlorhexidine 0.12% Liquid 15 milliLiter(s) Swish and Spit two times a day  chlorhexidine 2% Cloths 1 Application(s) Topical daily  dextrose 10%. 1000 milliLiter(s) (60 mL/Hr) IV Continuous <Continuous>  dextrose 5%. 1000 milliLiter(s) (50 mL/Hr) IV Continuous <Continuous>  dextrose 50% Injectable 12.5 Gram(s) IV Push once  dextrose 50% Injectable 25 Gram(s) IV Push once  dextrose 50% Injectable 25 Gram(s) IV Push once  ergocalciferol Drops 6000 Unit(s) Oral daily  escitalopram 5 milliGRAM(s) Oral daily  folic acid 1 milliGRAM(s) Oral daily  heparin  Infusion 1500 Unit(s)/Hr (15 mL/Hr) IV Continuous <Continuous>  hydrocortisone sodium succinate Injectable 50 milliGRAM(s) IV Push every 6 hours  insulin glargine Injectable (LANTUS) 10 Unit(s) SubCutaneous at bedtime  insulin regular  human corrective regimen sliding scale   SubCutaneous every 6 hours  insulin regular  human recombinant 7 Unit(s) SubCutaneous every 6 hours  levETIRAcetam  Solution 250 milliGRAM(s) Oral <User Schedule>  levETIRAcetam  Solution 500 milliGRAM(s) Oral every 24 hours  midodrine 15 milliGRAM(s) Oral every 8 hours  modafinil 100 milliGRAM(s) Oral <User Schedule>  multivitamin 1 Tablet(s) Oral daily  norepinephrine Infusion 0.01 MICROgram(s)/kG/Min (1.401 mL/Hr) IV Continuous <Continuous>  pantoprazole   Suspension 40 milliGRAM(s) Oral daily  piperacillin/tazobactam IVPB. 2.25 Gram(s) IV Intermittent every 8 hours  psyllium Powder 1 Packet(s) Oral daily  silver sulfADIAZINE 1% Cream 1 Application(s) Topical daily  sodium chloride 0.9% Bolus 500 milliLiter(s) IV Bolus once  sodium chloride 0.9% Bolus 1000 milliLiter(s) IV Bolus once  thiamine 100 milliGRAM(s) Oral daily    MEDICATIONS  (PRN):  acetaminophen    Suspension. 650 milliGRAM(s) Oral every 6 hours PRN Moderate Pain (4 - 6)  acetaminophen  Suppository 650 milliGRAM(s) Rectal every 6 hours PRN For Temp greater than 38 C (100.4 F)  dextrose Gel 1 Dose(s) Oral once PRN Blood Glucose LESS THAN 70 milliGRAM(s)/deciliter  glucagon  Injectable 1 milliGRAM(s) IntraMuscular once PRN Glucose LESS THAN 70 milligrams/deciliter      ALLERGIES:  No Known Allergies      LABS:                   04-26-18 @ 07:01  -  04-27-18 @ 07:00  --------------------------------------------------------  IN: 0 mL / OUT: 1000 mL / NET: -1000 mL    04-27-18 @ 07:01  -  04-28-18 @ 05:03  --------------------------------------------------------  IN: 252 mL / OUT: 0 mL / NET: 252 mL      RADIOLOGY & ADDITIONAL TESTS: Reviewed.        Assessment/Plan:  63M PMH HFrEF 10-15% (ischemic), MI, s/p AICD vs PPM, possible Afib, HTN, DM2 on insulin, possible CKD, and gout, who presented with a chief complaint of generalized weakness s/p septic shock likely 2/2 LE cellulitis complicated by ATN requiring dialysis. Course also c/b AMS likely from brainstem infarct 2/2 LV thrombus and/or toxic metabolic encephalopathy and found to have candidemia and sepsis 2/2 klebsiella bacteremia s/p fluconazole and CTX and de-lining. Transitioned to PO meds through PEG for end-goal of L-TACH at new baseline mental status. Pt continuing on HD with new permacath placed 4/25 and a/c with hep bridge to coumadin for afib and LV thrombus. Currently ventilator dependent tolerating daily CPAP trials. Remaining stable and medically optimized for step down to 7[facility].    NEURO  #Toxic Metabolic Encephalopathy- Acute change in mental status likely 2/2 brainstem infarct with component of encephalopathy from infection. 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 Per neurology may have had ischemic event from hypoperfusion. Also showing disc protrusion at C3/C4 level coursing superiorly to the C2/C3 contacting the ventral spinal cord. Per neurology, this may be contributing to weakness, however would not explain change in mental status.  S/p PEG/trach on 4/4. Repeat CT head 4/11 performed due to concern for not moving LUE, showing only chronic infarctions. CT C-spine w/o contrast showing multilevel degenerative changes with mod-severe foraminal narrowing @ C3-C4  - C/w Modafinil   - patient at new baseline mental status  - goal for placement at L-TACH when stabilized  - MRI head repeat for prognostication of CVA- ordered    #Seizures- possible seizure activity seen on VEEG 4/22. C/w keppra 500 mg qd with extra 250 mg post HD days    ID  #Pneumonia: likely 2/2 aspiration of feeds given clinical course; pt coughing up feeds/secretion through trach with residual feeds in stomach. CXR showing new RLL infiltrate.  - started on vanc, dosing by level4/27--ordered for vanc 1250mg x1 and recheck vanc level in AM   - started on zosyn 2.25 q8 4/27  - f/u bcxs and ucxs     #Klebsiella Bacteremia- Resolved. S/p Ceftriaxone 2g q 24 hrs (4/15-4/26)  #Candidemia with candida tropicalis- Resolved. S/p completion of Fluconazole 200mg q 24hrs (4/13-4/24)    CARDIOVASCULAR   # Hypotension  - c/w Midodrine 15 mg q8h to maintain SBP>65  - c/w fludrocortisone 0.1mg qd and prednisone 6 mg BID for oral regimen    # CHF with EF 10-15% 2/2 ischemic cardiomyopathy s/p AICD. Elevated BNP on admission with R sided effusion and edema. Patient with persistent pleural effusions on CXR and US and b/l dependent edema.   - Maintain negative fluid balance with dialysis  - c/w holding ACE/BB in setting of sepsis/ hypotension on midodrine    #AFIB- hx of Afib and LV thrombus on coumadin prior to admission; TTE with definity shows no LV thrombus currently   - c/w Heparin gtt bridging to coumadin  - c/w holding Metoprolol 12.5 q12h given hypotension. Will use Dig for rate control if RVR  - HR goal <110    #LV thrombus: RUKHSANA on 4/10 showing obvious thrombus in LV  - c/w hep gtt bridging to coumadin     #CAD- Hx of MI and ischemic CM s/p AICD, STEMI 7/2017.   - c/w aspirin 81 and Atorvastatin 80mg qhs.     PULMONARY   #Chronic Resp failure- S/p tracheostomy 4/5. Bedside US with simple b/l pleural effusions and atelectatic lung. Less concern for infectious source given b/l effusions with no septations/loculations. Likely related to fluid overload. If clinically worsening, can consider thoracentesis for fluid studies and cultures.  - c/w daily CPAP trials, weaning to trach collar as tolerated  - c/w HD to remove excess fluid    RENAL   #Chronic renal failure- likely ischemic ATN in setting of sepsis with low intravascular volume. Unknown baseline Cr presenting with Cr 3.93 with metabolic derangements. Renal team on board, now on intermittent HD. HD catheter removed 4/8 due to fungemia, now with L subclavian HD cath.   - Next HD session today, c/w intermittent HD per renal recs  - patient has NOT required albumin on HD    #Adrenal Insufficiency- c/w fludrocortisone and prednisone.    #Elevated AG- Fluctuating at low level; lactate negative, glucose has been well controlled, possibly 2/2 uremia in setting of ESRD.   - Monitor BMP    GI   #PEG in place- C/D/I, holding tube feeds in setting of new aspiration PNA and abdominal distension.    ENDOCRINE   #Diabetes- HbA1C 8.6.  - c/w regular insulin 7 units q6  - c/w lantus 21u qhs and adjust as needed  - MISS, monitor FSG and adjust as needed       MSK  #LUE Swelling- LUE US showing soft tissue swelling with no fluid collection. Ortho rec no intervention, supportive care.    F: none  E: Replete K<4 Mg<2, caution in setting of acute renal failure  N: PEG Placed 4/4, Vital 1.5 given diarrhea  DVT ppx: on heparin bridging to coumadin  GI ppx: PPI 40 daily  Lines: R permacath (4/25), L IJ (4/28)  Drips: none  Full code  Dispo: MICU PGY1 PROGRESS NOTE:    OVERNIGHT EVENTS: Lactate uptrended to 4.6. Gave total 1500 cc NS bolus. PTT was 96, decreased heparin gtt by 1 to 15ml/h.     SUBJECTIVE / INTERVAL HPI: Patient seen and examined at bedside. Patient lying in bed comfortable appearing, non-toxic. Nodding head appropriately to questions. Following some commands. No complaints that patient is able to endorse.     VITAL SIGNS:  Vital Signs Last 24 Hrs  T(C): 37.1 (28 Apr 2018 01:00), Max: 38.5 (27 Apr 2018 14:52)  T(F): 98.7 (28 Apr 2018 01:00), Max: 101.3 (27 Apr 2018 14:52)  HR: 103 (28 Apr 2018 04:31) (94 - 123)  BP: 116/91 (28 Apr 2018 02:00) (67/48 - 186/157)  BP(mean): 126 (28 Apr 2018 01:00) (55 - 168)  RR: 22 (28 Apr 2018 04:31) (15 - 25)  SpO2: 100% (28 Apr 2018 04:31) (69% - 100%)    PHYSICAL EXAM:  General: NAD, laying comfortably in bed  HEENT: NC/AT, PERRL, clear conjunctiva, dry MM  Neck: supple, trach in place with green mucous at opening, R permacath  Respiratory: coarse breath sounds b/l,  no wheezing or rales, no retractions or labored breathing, on ventilatory support   Cardiovascular: +S1/S2; RRR, no M/R/G  Abdomen: soft, moderately distended, normal BS, no rebound or guarding, PEG in place with brown dried secretions surrounding  Extremities: WWP, 2+ dependent edema to level of thigh b/l, LUE with eschar on medial surface of forearm with surrounding tense edema, no fluctuance  Vasc: 2+ peripheral pulses b/l  Skin: wrinkled, peeling skin on feet b/l, moisturized with no open cracks or sores   Neurological: nonverbal, following some commands, RUE flexed and rigid, LUE rigid, no spontaneous movement of b/l LE    MEDICATIONS:  MEDICATIONS  (STANDING):  aspirin  chewable 81 milliGRAM(s) Oral daily  atorvastatin 80 milliGRAM(s) Oral at bedtime  chlorhexidine 0.12% Liquid 15 milliLiter(s) Swish and Spit two times a day  chlorhexidine 2% Cloths 1 Application(s) Topical daily  dextrose 10%. 1000 milliLiter(s) (60 mL/Hr) IV Continuous <Continuous>  dextrose 5%. 1000 milliLiter(s) (50 mL/Hr) IV Continuous <Continuous>  dextrose 50% Injectable 12.5 Gram(s) IV Push once  dextrose 50% Injectable 25 Gram(s) IV Push once  dextrose 50% Injectable 25 Gram(s) IV Push once  ergocalciferol Drops 6000 Unit(s) Oral daily  escitalopram 5 milliGRAM(s) Oral daily  folic acid 1 milliGRAM(s) Oral daily  heparin  Infusion 1500 Unit(s)/Hr (15 mL/Hr) IV Continuous <Continuous>  hydrocortisone sodium succinate Injectable 50 milliGRAM(s) IV Push every 6 hours  insulin glargine Injectable (LANTUS) 10 Unit(s) SubCutaneous at bedtime  insulin regular  human corrective regimen sliding scale   SubCutaneous every 6 hours  insulin regular  human recombinant 7 Unit(s) SubCutaneous every 6 hours  levETIRAcetam  Solution 250 milliGRAM(s) Oral <User Schedule>  levETIRAcetam  Solution 500 milliGRAM(s) Oral every 24 hours  midodrine 15 milliGRAM(s) Oral every 8 hours  modafinil 100 milliGRAM(s) Oral <User Schedule>  multivitamin 1 Tablet(s) Oral daily  norepinephrine Infusion 0.01 MICROgram(s)/kG/Min (1.401 mL/Hr) IV Continuous <Continuous>  pantoprazole   Suspension 40 milliGRAM(s) Oral daily  piperacillin/tazobactam IVPB. 2.25 Gram(s) IV Intermittent every 8 hours  psyllium Powder 1 Packet(s) Oral daily  silver sulfADIAZINE 1% Cream 1 Application(s) Topical daily  sodium chloride 0.9% Bolus 500 milliLiter(s) IV Bolus once  sodium chloride 0.9% Bolus 1000 milliLiter(s) IV Bolus once  thiamine 100 milliGRAM(s) Oral daily    MEDICATIONS  (PRN):  acetaminophen    Suspension. 650 milliGRAM(s) Oral every 6 hours PRN Moderate Pain (4 - 6)  acetaminophen  Suppository 650 milliGRAM(s) Rectal every 6 hours PRN For Temp greater than 38 C (100.4 F)  dextrose Gel 1 Dose(s) Oral once PRN Blood Glucose LESS THAN 70 milliGRAM(s)/deciliter  glucagon  Injectable 1 milliGRAM(s) IntraMuscular once PRN Glucose LESS THAN 70 milligrams/deciliter      ALLERGIES:  No Known Allergies      LABS:                            9.5    42.2  )-----------( 259      ( 28 Apr 2018 04:03 )             29.8     04-28    137  |  92<L>  |  48<H>  ----------------------------<  151<H>  4.8   |  20<L>  |  3.61<H>    Ca    9.2      28 Apr 2018 04:03  Phos  7.2     04-28  Mg     2.0     04-28    TPro  7.4  /  Alb  3.2<L>  /  TBili  0.8  /  DBili  0.3<H>  /  AST  29  /  ALT  61<H>  /  AlkPhos  327<H>  04-27    PT/INR - ( 28 Apr 2018 04:03 )   PT: 19.8 sec;   INR: 1.76       PTT - ( 28 Apr 2018 12:08 )  PTT:74.5 sec      CAPILLARY BLOOD GLUCOSE  POCT Blood Glucose.: 167 mg/dL (28 Apr 2018 11:41)  POCT Blood Glucose.: 157 mg/dL (28 Apr 2018 06:34)  POCT Blood Glucose.: 132 mg/dL (28 Apr 2018 00:13)  POCT Blood Glucose.: 70 mg/dL (27 Apr 2018 17:59)    I&O's Summary  27 Apr 2018 07:01  -  28 Apr 2018 07:00  --------------------------------------------------------  IN: 1204 mL / OUT: 0 mL / NET: 1204 mL    28 Apr 2018 07:01  -  28 Apr 2018 16:41  --------------------------------------------------------  IN: 844 mL / OUT: 1000 mL / NET: -156 mL             RADIOLOGY & ADDITIONAL TESTS: Reviewed.      Assessment/Plan:  63M PMH HFrEF 10-15% (ischemic), MI, s/p AICD vs PPM, possible Afib, HTN, DM2 on insulin, possible CKD, and gout, who presented with a chief complaint of generalized weakness s/p septic shock likely 2/2 LE cellulitis complicated by ATN requiring dialysis. Course also c/b AMS likely from brainstem infarct 2/2 LV thrombus and/or toxic metabolic encephalopathy and found to have candidemia and sepsis 2/2 klebsiella bacteremia s/p fluconazole and CTX and de-lining. Transitioned to PO meds through PEG for end-goal of L-TACH at new baseline mental status. Pt continuing on HD with new permacath placed 4/25 and a/c with hep bridge to coumadin for afib and LV thrombus. Currently ventilator dependent tolerating daily CPAP trials. Remaining stable and medically optimized for step down to [facility].    NEURO  #Toxic Metabolic Encephalopathy- Acute change in mental status likely 2/2 brainstem infarct with component of encephalopathy from infection. 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 Per neurology may have had ischemic event from hypoperfusion. Also showing disc protrusion at C3/C4 level coursing superiorly to the C2/C3 contacting the ventral spinal cord. Per neurology, this may be contributing to weakness, however would not explain change in mental status.    - d/w neurology need for Modafinil   - goal for placement at L-TACH when stabilized  - MRI head repeat for prognostication of CVA- ordered    #Seizures- c/w keppra 500 mg qd with extra 250 mg post HD days    ID  #Pneumonia- suspect aspiration pna from aspiration of feeds given that patient seen coughing up feeds/secretion through trach with residual feeds in stomach. CXR showing new RLL infiltrate.  - c/w Vancomycin (4/27-), dosed by level - ordered for Vancomycin 1250mg today after HD  - c/w Zosyn 2.25 q8hrs (4/27-)  - Bcxs NGTD, f/u sputum cxs      #Klebsiella Bacteremia- Resolved. S/p Ceftriaxone 2g q 24 hrs (4/15-4/26)  #Candidemia with candida tropicalis- Resolved. S/p completion of Fluconazole 200mg q 24hrs (4/13-4/24)    CARDIOVASCULAR   # Septic Shock requiring Levophed- Improving, levophed requirements decreasing.   - c/w Levophed titrated to MAP >65  - c/w Midodrine 15 mg q8h  - c/w Solucortef 50mg q6hrs stress dose steroids    # CHF with EF 10-15% 2/2 ischemic cardiomyopathy s/p AICD. Elevated BNP on admission with R sided effusion and edema. Patient with persistent pleural effusions on CXR and US and b/l dependent edema.   - Maintain negative fluid balance with dialysis  - c/w holding ACE/BB in setting of sepsis/ hypotension    #AFIB- hx of Afib and LV thrombus on coumadin prior to admission; TTE with definity shows no LV thrombus currently   - c/w Heparin gtt  - c/w holding Metoprolol 12.5 q12h given hypotension. Will use Dig for rate control if RVR.    #LV thrombus: RUKHSANA on 4/10 showing obvious thrombus in LV  - c/w hep gtt for AC    #CAD- Hx of MI and ischemic CM s/p AICD, STEMI 7/2017.   - c/w aspirin 81 and Atorvastatin 80mg qhs    PULMONARY   #Chronic Resp failure- S/p tracheostomy 4/5. Bedside US with simple b/l pleural effusions and atelectatic lung. Less concern for infectious source given b/l effusions with no septations/loculations. Likely related to fluid overload. If clinically worsening, can consider thoracentesis for fluid studies and cultures.  - c/w daily CPAP trials, weaning to trach collar as tolerated  - c/w HD to remove excess fluid    RENAL   #Chronic renal failure- likely ischemic ATN in setting of sepsis with low intravascular volume. Unknown baseline Cr presenting with Cr 3.93 with metabolic derangements. Renal team on board, now on intermittent HD. HD catheter removed 4/8 due to fungemia, now with L subclavian HD cath.   - Next HD session today, c/w intermittent HD per renal recs  - patient has NOT required albumin on HD    #Adrenal Insufficiency- previously on fludrocortisone and prednisone. Started on stress dose steroids in setting of acute infection.    #Elevated AG- Fluctuating at low level; lactate negative, glucose has been well controlled, possibly 2/2 uremia in setting of ESRD.   - Monitor BMP    GI   #PEG in place- holding tube feeds in setting of new aspiration PNA and abdominal distension.  - started Reglan 5mg q6hrs   - will reassess abs exam in PM and consider restarting tube feeds at slow rate    ENDOCRINE   #Diabetes- HbA1C 8.6.  - c/w D10 gtt at 70cc/hr while holding tube feeds  - holding regular insulin while holding tube feeds  - c/w lantus 21u qhs  - MISS, monitor FSG and adjust as needed     MSK  #LUE Swelling- LUE US showing soft tissue swelling with no fluid collection. Ortho rec no intervention, supportive care.    F: none  E: Replete K<4 Mg<2, caution in setting of acute renal failure  N: PEG Placed 4/4, holding tube feeds for now   DVT ppx: on heparin gtt  GI ppx: PPI 40 daily  Lines: R permacath (4/25), L IJ (4/28)  Drips: none  Full code  Dispo: MICU PGY1 PROGRESS NOTE:    OVERNIGHT EVENTS: Lactate uptrended to 4.6. Gave total 1500 cc NS bolus. PTT was 96, decreased heparin gtt by 1 to 15ml/h.     SUBJECTIVE / INTERVAL HPI: Patient seen and examined at bedside. Patient lying in bed comfortable appearing, non-toxic. Nodding head appropriately to questions. Following some commands. No complaints that patient is able to endorse but ROS not obtainable.     VITAL SIGNS:  Vital Signs Last 24 Hrs  T(C): 37.1 (28 Apr 2018 01:00), Max: 38.5 (27 Apr 2018 14:52)  T(F): 98.7 (28 Apr 2018 01:00), Max: 101.3 (27 Apr 2018 14:52)  HR: 103 (28 Apr 2018 04:31) (94 - 123)  BP: 116/91 (28 Apr 2018 02:00) (67/48 - 186/157)  BP(mean): 126 (28 Apr 2018 01:00) (55 - 168)  RR: 22 (28 Apr 2018 04:31) (15 - 25)  SpO2: 100% (28 Apr 2018 04:31) (69% - 100%)    PHYSICAL EXAM:  General: NAD, laying comfortably in bed  HEENT: NC/AT, PERRL, clear conjunctiva, dry MM  Neck: supple, trach in place with green mucous at opening, R permacath  Respiratory: coarse breath sounds b/l,  no wheezing or rales, no retractions or labored breathing, on ventilatory support   Cardiovascular: +S1/S2; RRR, no M/R/G  Abdomen: soft, moderately distended, normal BS, no rebound or guarding, PEG in place with brown dried secretions surrounding  Extremities: WWP, 2+ dependent edema to level of thigh b/l, LUE with eschar on medial surface of forearm with surrounding tense edema, no fluctuance  Vasc: 2+ peripheral pulses b/l  Skin: wrinkled, peeling skin on feet b/l, moisturized with no open cracks or sores   Neurological: nonverbal, following some commands, RUE flexed and rigid, LUE rigid, no spontaneous movement of b/l LE    MEDICATIONS:  MEDICATIONS  (STANDING):  aspirin  chewable 81 milliGRAM(s) Oral daily  atorvastatin 80 milliGRAM(s) Oral at bedtime  chlorhexidine 0.12% Liquid 15 milliLiter(s) Swish and Spit two times a day  chlorhexidine 2% Cloths 1 Application(s) Topical daily  dextrose 10%. 1000 milliLiter(s) (60 mL/Hr) IV Continuous <Continuous>  dextrose 5%. 1000 milliLiter(s) (50 mL/Hr) IV Continuous <Continuous>  dextrose 50% Injectable 12.5 Gram(s) IV Push once  dextrose 50% Injectable 25 Gram(s) IV Push once  dextrose 50% Injectable 25 Gram(s) IV Push once  ergocalciferol Drops 6000 Unit(s) Oral daily  escitalopram 5 milliGRAM(s) Oral daily  folic acid 1 milliGRAM(s) Oral daily  heparin  Infusion 1500 Unit(s)/Hr (15 mL/Hr) IV Continuous <Continuous>  hydrocortisone sodium succinate Injectable 50 milliGRAM(s) IV Push every 6 hours  insulin glargine Injectable (LANTUS) 10 Unit(s) SubCutaneous at bedtime  insulin regular  human corrective regimen sliding scale   SubCutaneous every 6 hours  insulin regular  human recombinant 7 Unit(s) SubCutaneous every 6 hours  levETIRAcetam  Solution 250 milliGRAM(s) Oral <User Schedule>  levETIRAcetam  Solution 500 milliGRAM(s) Oral every 24 hours  midodrine 15 milliGRAM(s) Oral every 8 hours  modafinil 100 milliGRAM(s) Oral <User Schedule>  multivitamin 1 Tablet(s) Oral daily  norepinephrine Infusion 0.01 MICROgram(s)/kG/Min (1.401 mL/Hr) IV Continuous <Continuous>  pantoprazole   Suspension 40 milliGRAM(s) Oral daily  piperacillin/tazobactam IVPB. 2.25 Gram(s) IV Intermittent every 8 hours  psyllium Powder 1 Packet(s) Oral daily  silver sulfADIAZINE 1% Cream 1 Application(s) Topical daily  sodium chloride 0.9% Bolus 500 milliLiter(s) IV Bolus once  sodium chloride 0.9% Bolus 1000 milliLiter(s) IV Bolus once  thiamine 100 milliGRAM(s) Oral daily    MEDICATIONS  (PRN):  acetaminophen    Suspension. 650 milliGRAM(s) Oral every 6 hours PRN Moderate Pain (4 - 6)  acetaminophen  Suppository 650 milliGRAM(s) Rectal every 6 hours PRN For Temp greater than 38 C (100.4 F)  dextrose Gel 1 Dose(s) Oral once PRN Blood Glucose LESS THAN 70 milliGRAM(s)/deciliter  glucagon  Injectable 1 milliGRAM(s) IntraMuscular once PRN Glucose LESS THAN 70 milligrams/deciliter      ALLERGIES:  No Known Allergies      LABS:                            9.5    42.2  )-----------( 259      ( 28 Apr 2018 04:03 )             29.8     04-28    137  |  92<L>  |  48<H>  ----------------------------<  151<H>  4.8   |  20<L>  |  3.61<H>    Ca    9.2      28 Apr 2018 04:03  Phos  7.2     04-28  Mg     2.0     04-28    TPro  7.4  /  Alb  3.2<L>  /  TBili  0.8  /  DBili  0.3<H>  /  AST  29  /  ALT  61<H>  /  AlkPhos  327<H>  04-27    PT/INR - ( 28 Apr 2018 04:03 )   PT: 19.8 sec;   INR: 1.76       PTT - ( 28 Apr 2018 12:08 )  PTT:74.5 sec      CAPILLARY BLOOD GLUCOSE  POCT Blood Glucose.: 167 mg/dL (28 Apr 2018 11:41)  POCT Blood Glucose.: 157 mg/dL (28 Apr 2018 06:34)  POCT Blood Glucose.: 132 mg/dL (28 Apr 2018 00:13)  POCT Blood Glucose.: 70 mg/dL (27 Apr 2018 17:59)    I&O's Summary  27 Apr 2018 07:01  -  28 Apr 2018 07:00  --------------------------------------------------------  IN: 1204 mL / OUT: 0 mL / NET: 1204 mL    28 Apr 2018 07:01  -  28 Apr 2018 16:41  --------------------------------------------------------  IN: 844 mL / OUT: 1000 mL / NET: -156 mL             RADIOLOGY & ADDITIONAL TESTS: Reviewed.      Assessment/Plan:  63M PMH HFrEF 10-15% (ischemic), MI, s/p AICD vs PPM, possible Afib, HTN, DM2 on insulin, possible CKD, and gout, who presented with a chief complaint of generalized weakness s/p septic shock likely 2/2 LE cellulitis complicated by ATN requiring dialysis. Course also c/b AMS likely from brainstem infarct 2/2 LV thrombus and/or toxic metabolic encephalopathy and found to have candidemia and sepsis 2/2 klebsiella bacteremia s/p fluconazole and CTX and de-lining. Transitioned to PO meds through PEG for end-goal of L-TACH at new baseline mental status. Pt continuing on HD with new permacath placed 4/25 and a/c with hep bridge to coumadin for afib and LV thrombus. Currently ventilator dependent tolerating daily CPAP trials and with resloving septic shock from aspiration pneumonia    NEURO  #Toxic Metabolic Encephalopathy- Acute change in mental status likely 2/2 brainstem infarct with component of encephalopathy from infection. 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 Per neurology may have had ischemic event from hypoperfusion. Also showing disc protrusion at C3/C4 level coursing superiorly to the C2/C3 contacting the ventral spinal cord. Per neurology, this may be contributing to weakness, however would not explain change in mental status.    - d/w neurology need for Modafinil   - goal for placement at L-TACH when stabilized  - MRI head repeat for prognostication of CVA- ordered    #Seizures- c/w keppra 500 mg qd with extra 250 mg post HD days    ID  #Pneumonia- suspect aspiration pna from aspiration of feeds given that patient seen coughing up feeds/secretion through trach with residual feeds in stomach. CXR showing new RLL infiltrate.  - c/w Vancomycin (4/27-), dosed by level - ordered for Vancomycin 1250mg today after HD  - c/w Zosyn 2.25 q8hrs (4/27-)  - Bcxs NGTD, f/u sputum cxs      #Klebsiella Bacteremia- Resolved. S/p Ceftriaxone 2g q 24 hrs (4/15-4/26)  #Candidemia with candida tropicalis- Resolved. S/p completion of Fluconazole 200mg q 24hrs (4/13-4/24)    CARDIOVASCULAR   # Septic Shock requiring Levophed- Improving, levophed requirements decreasing.   - c/w Levophed titrated to MAP >65  - c/w Midodrine 15 mg q8h  - c/w Solucortef 50mg q6hrs stress dose steroids    # CHF with EF 10-15% 2/2 ischemic cardiomyopathy s/p AICD. Elevated BNP on admission with R sided effusion and edema. Patient with persistent pleural effusions on CXR and US and b/l dependent edema.   - Maintain negative fluid balance with dialysis  - c/w holding ACE/BB in setting of sepsis/ hypotension    #AFIB- hx of Afib and LV thrombus on coumadin prior to admission; TTE with definity shows no LV thrombus currently   - c/w Heparin gtt  - c/w holding Metoprolol 12.5 q12h given hypotension. Will use Dig for rate control if RVR.    #LV thrombus: RUKHSANA on 4/10 showing obvious thrombus in LV  - c/w hep gtt for AC    #CAD- Hx of MI and ischemic CM s/p AICD, STEMI 7/2017.   - c/w aspirin 81 and Atorvastatin 80mg qhs    PULMONARY   #Chronic Resp failure- S/p tracheostomy 4/5. Bedside US with simple b/l pleural effusions and atelectatic lung. Less concern for infectious source given b/l effusions with no septations/loculations. Likely related to fluid overload. If clinically worsening, can consider thoracentesis for fluid studies and cultures.  - c/w daily CPAP trials, weaning to trach collar as tolerated  - c/w HD to remove excess fluid    RENAL   #Chronic renal failure- likely ischemic ATN in setting of sepsis with low intravascular volume. Unknown baseline Cr presenting with Cr 3.93 with metabolic derangements. Renal team on board, now on intermittent HD. HD catheter removed 4/8 due to fungemia, now with L subclavian HD cath.   - Next HD session today, c/w intermittent HD per renal recs  - patient has NOT required albumin on HD    #Adrenal Insufficiency- previously on fludrocortisone and prednisone. Started on stress dose steroids in setting of acute infection.    #Elevated AG- Fluctuating at low level; lactate negative, glucose has been well controlled, possibly 2/2 uremia in setting of ESRD.   - Monitor BMP    GI   #PEG in place- holding tube feeds in setting of new aspiration PNA and abdominal distension.  - started Reglan 5mg q6hrs   - will reassess abs exam in PM and consider restarting tube feeds at slow rate    ENDOCRINE   #Diabetes- HbA1C 8.6.  - c/w D10 gtt at 70cc/hr while holding tube feeds  - holding regular insulin while holding tube feeds  - c/w lantus 21u qhs  - MISS, monitor FSG and adjust as needed     MSK  #LUE Swelling- LUE US showing soft tissue swelling with no fluid collection. Ortho rec no intervention, supportive care.    F: none  E: Replete K<4 Mg<2, caution in setting of acute renal failure  N: PEG Placed 4/4, holding tube feeds for now   DVT ppx: on heparin gtt  GI ppx: PPI 40 daily  Lines: R permacath (4/25), L IJ (4/28)  Drips: none  Full code  Dispo: MICU

## 2018-04-28 NOTE — PROGRESS NOTE ADULT - ATTENDING COMMENTS
Patient examined, fellow's hx and PE reviewed and confirmed. I find GILMER, fluid overload, anemia. A/P reviewed and confirmed. Follow Scr. UF on HD. See full note.

## 2018-04-28 NOTE — PROGRESS NOTE ADULT - SUBJECTIVE AND OBJECTIVE BOX
Patient is a 63y Male seen and evaluated at bedside. Patient still remain critically ill on ventilatory support and tracheostomy. Patient still remains oligoanuric and required RRT for GILMER due to ischemic ATN for now. Last HD on 4/26.     acetaminophen    Suspension. 650 every 6 hours PRN  acetaminophen  Suppository 650 every 6 hours PRN  aspirin  chewable 81 daily  atorvastatin 80 at bedtime  chlorhexidine 0.12% Liquid 15 two times a day  chlorhexidine 2% Cloths 1 daily  dextrose 10%. 1000 <Continuous>  dextrose 5%. 1000 <Continuous>  dextrose 50% Injectable 12.5 once  dextrose 50% Injectable 25 once  dextrose 50% Injectable 25 once  dextrose Gel 1 once PRN  ergocalciferol Drops 6000 daily  escitalopram 5 daily  folic acid 1 daily  glucagon  Injectable 1 once PRN  heparin  Infusion 1500 <Continuous>  hydrocortisone sodium succinate Injectable 50 every 6 hours  insulin glargine Injectable (LANTUS) 10 at bedtime  insulin regular  human corrective regimen sliding scale  every 6 hours  insulin regular  human recombinant 7 every 6 hours  levETIRAcetam  Solution 250 <User Schedule>  levETIRAcetam  Solution 500 every 24 hours  metoclopramide Injectable 5 every 8 hours  midodrine 15 every 8 hours  modafinil 100 <User Schedule>  multivitamin 1 daily  norepinephrine Infusion 0.01 <Continuous>  pantoprazole   Suspension 40 daily  piperacillin/tazobactam IVPB. 2.25 every 8 hours  psyllium Powder 1 daily  silver sulfADIAZINE 1% Cream 1 daily  thiamine 100 daily      Allergies    No Known Allergies    Intolerances        T(C): , Max: 38.5 (04-27-18 @ 14:52)  T(F): , Max: 101.3 (04-27-18 @ 14:52)  HR: 101 (04-28-18 @ 12:31)  BP: 85/68 (04-28-18 @ 12:00)  BP(mean): 75 (04-28-18 @ 12:00)  RR: 22 (04-28-18 @ 12:00)  SpO2: 96% (04-28-18 @ 12:00)  Wt(kg): --    04-27 @ 07:01  -  04-28 @ 07:00  --------------------------------------------------------  IN: 1204 mL / OUT: 0 mL / NET: 1204 mL    04-28 @ 07:01  -  04-28 @ 13:28  --------------------------------------------------------  IN: 81 mL / OUT: 1000 mL / NET: -919 mL          Review of Systems:  Limited. Patient non verbal on ventilatory support.      PHYSICAL EXAM:  GENERAL:  Ill appearing, cachectic, lying in bed  in no acute distress at present  HEAD:  Atraumatic, Normocephalic,   EYES: Bilateral conjuctiva and sclera normal   Oral cavity: Oral mucosa dry and pale  NECK: Neck supple, No JVD  CHEST/LUNG: Bilateral decreased breath sounds, Bibasilar minimal rales and crepitation+nt, no wheezing  HEART: Regular rate and rhythm. HOMER II/VI at LPSB, No gallop, no rub   ABDOMEN: Soft, Nontender, nondistended, PEG tube present. BS+nt, No flank tenderness.   EXTREMITIES: No clubbing, cyanosis, or edema  Neurology: AAOx1, non verbal, unable to follow verbal commands.  SKIN: No rashes or lesions          ACCESS:  Right chest wall tunnel HD catheter present.     LABS:                        9.5    42.2  )-----------( 259      ( 28 Apr 2018 04:03 )             29.8     04-28    137  |  92<L>  |  48<H>  ----------------------------<  151<H>  4.8   |  20<L>  |  3.61<H>    Ca    9.2      28 Apr 2018 04:03  Phos  7.2     04-28  Mg     2.0     04-28    TPro  7.4  /  Alb  3.2<L>  /  TBili  0.8  /  DBili  0.3<H>  /  AST  29  /  ALT  61<H>  /  AlkPhos  327<H>  04-27      PT/INR - ( 28 Apr 2018 04:03 )   PT: 19.8 sec;   INR: 1.76          PTT - ( 28 Apr 2018 12:08 )  PTT:74.5 sec          RADIOLOGY & ADDITIONAL STUDIES:  < from: Xray Abdomen 2 View PORTABLE -Urgent (04.28.18 @ 09:35) >    EXAM:  XR ABDOMEN PORTABLE URGENT 2V                          PROCEDURE DATE:  04/28/2018                     INTERPRETATION:  Clinical History: Distention    Portable examination the abdomen demonstrates nonspecific bowel gas   pattern. No evidence of obstruction. Patient status post placement PEG   device        Impression: Nonspecific bowel gas pattern. No evidence of obstruction            "Thank you for the opportunity to participate in the care of this   patient."        RADHA MAXWELL M.D., ATTENDING RADIOLOGIST  This document has been electronically signed. Apr 28 2018 11:46AM                  < end of copied text >

## 2018-04-29 DIAGNOSIS — A41.9 SEPSIS, UNSPECIFIED ORGANISM: ICD-10-CM

## 2018-04-29 LAB
ANION GAP SERPL CALC-SCNC: 20 MMOL/L — HIGH (ref 5–17)
APTT BLD: 63.3 SEC — HIGH (ref 27.5–37.4)
BUN SERPL-MCNC: 35 MG/DL — HIGH (ref 7–23)
CALCIUM SERPL-MCNC: 8.8 MG/DL — SIGNIFICANT CHANGE UP (ref 8.4–10.5)
CHLORIDE SERPL-SCNC: 94 MMOL/L — LOW (ref 96–108)
CO2 SERPL-SCNC: 21 MMOL/L — LOW (ref 22–31)
CREAT SERPL-MCNC: 2.97 MG/DL — HIGH (ref 0.5–1.3)
GLUCOSE BLDC GLUCOMTR-MCNC: 104 MG/DL — HIGH (ref 70–99)
GLUCOSE BLDC GLUCOMTR-MCNC: 146 MG/DL — HIGH (ref 70–99)
GLUCOSE BLDC GLUCOMTR-MCNC: 162 MG/DL — HIGH (ref 70–99)
GLUCOSE BLDC GLUCOMTR-MCNC: 190 MG/DL — HIGH (ref 70–99)
GLUCOSE SERPL-MCNC: 96 MG/DL — SIGNIFICANT CHANGE UP (ref 70–99)
HCT VFR BLD CALC: 25.1 % — LOW (ref 39–50)
HGB BLD-MCNC: 7.8 G/DL — LOW (ref 13–17)
INR BLD: 1.89 — HIGH (ref 0.88–1.16)
MAGNESIUM SERPL-MCNC: 1.9 MG/DL — SIGNIFICANT CHANGE UP (ref 1.6–2.6)
MCHC RBC-ENTMCNC: 28.4 PG — SIGNIFICANT CHANGE UP (ref 27–34)
MCHC RBC-ENTMCNC: 31.1 G/DL — LOW (ref 32–36)
MCV RBC AUTO: 91.3 FL — SIGNIFICANT CHANGE UP (ref 80–100)
PHOSPHATE SERPL-MCNC: 5.2 MG/DL — HIGH (ref 2.5–4.5)
PLATELET # BLD AUTO: 256 K/UL — SIGNIFICANT CHANGE UP (ref 150–400)
POTASSIUM SERPL-MCNC: 4.3 MMOL/L — SIGNIFICANT CHANGE UP (ref 3.5–5.3)
POTASSIUM SERPL-SCNC: 4.3 MMOL/L — SIGNIFICANT CHANGE UP (ref 3.5–5.3)
PROTHROM AB SERPL-ACNC: 21.3 SEC — HIGH (ref 9.8–12.7)
RBC # BLD: 2.75 M/UL — LOW (ref 4.2–5.8)
RBC # FLD: 18.3 % — HIGH (ref 10.3–16.9)
SODIUM SERPL-SCNC: 135 MMOL/L — SIGNIFICANT CHANGE UP (ref 135–145)
WBC # BLD: 34 K/UL — HIGH (ref 3.8–10.5)
WBC # FLD AUTO: 34 K/UL — HIGH (ref 3.8–10.5)

## 2018-04-29 PROCEDURE — 71045 X-RAY EXAM CHEST 1 VIEW: CPT | Mod: 26

## 2018-04-29 PROCEDURE — 99233 SBSQ HOSP IP/OBS HIGH 50: CPT | Mod: GC

## 2018-04-29 PROCEDURE — 99233 SBSQ HOSP IP/OBS HIGH 50: CPT

## 2018-04-29 RX ADMIN — Medication 5 MILLIGRAM(S): at 00:12

## 2018-04-29 RX ADMIN — Medication 50 MILLIGRAM(S): at 18:36

## 2018-04-29 RX ADMIN — Medication 5 MILLIGRAM(S): at 13:15

## 2018-04-29 RX ADMIN — PIPERACILLIN AND TAZOBACTAM 200 GRAM(S): 4; .5 INJECTION, POWDER, LYOPHILIZED, FOR SOLUTION INTRAVENOUS at 14:35

## 2018-04-29 RX ADMIN — Medication 5 MILLIGRAM(S): at 06:31

## 2018-04-29 RX ADMIN — ESCITALOPRAM OXALATE 5 MILLIGRAM(S): 10 TABLET, FILM COATED ORAL at 13:15

## 2018-04-29 RX ADMIN — MODAFINIL 100 MILLIGRAM(S): 200 TABLET ORAL at 06:55

## 2018-04-29 RX ADMIN — INSULIN HUMAN 7 UNIT(S): 100 INJECTION, SOLUTION SUBCUTANEOUS at 23:53

## 2018-04-29 RX ADMIN — CHLORHEXIDINE GLUCONATE 1 APPLICATION(S): 213 SOLUTION TOPICAL at 06:32

## 2018-04-29 RX ADMIN — Medication 5 MILLIGRAM(S): at 18:41

## 2018-04-29 RX ADMIN — PIPERACILLIN AND TAZOBACTAM 200 GRAM(S): 4; .5 INJECTION, POWDER, LYOPHILIZED, FOR SOLUTION INTRAVENOUS at 22:23

## 2018-04-29 RX ADMIN — Medication 50 MILLIGRAM(S): at 12:16

## 2018-04-29 RX ADMIN — MODAFINIL 100 MILLIGRAM(S): 200 TABLET ORAL at 13:18

## 2018-04-29 RX ADMIN — Medication 5 MILLIGRAM(S): at 23:51

## 2018-04-29 RX ADMIN — INSULIN HUMAN 7 UNIT(S): 100 INJECTION, SOLUTION SUBCUTANEOUS at 06:32

## 2018-04-29 RX ADMIN — Medication 1 PACKET(S): at 13:15

## 2018-04-29 RX ADMIN — INSULIN HUMAN 2: 100 INJECTION, SOLUTION SUBCUTANEOUS at 18:36

## 2018-04-29 RX ADMIN — Medication 1 MILLIGRAM(S): at 13:16

## 2018-04-29 RX ADMIN — MIDODRINE HYDROCHLORIDE 15 MILLIGRAM(S): 2.5 TABLET ORAL at 22:24

## 2018-04-29 RX ADMIN — MIDODRINE HYDROCHLORIDE 15 MILLIGRAM(S): 2.5 TABLET ORAL at 06:31

## 2018-04-29 RX ADMIN — PANTOPRAZOLE SODIUM 40 MILLIGRAM(S): 20 TABLET, DELAYED RELEASE ORAL at 13:15

## 2018-04-29 RX ADMIN — CHLORHEXIDINE GLUCONATE 15 MILLILITER(S): 213 SOLUTION TOPICAL at 22:24

## 2018-04-29 RX ADMIN — INSULIN HUMAN 0: 100 INJECTION, SOLUTION SUBCUTANEOUS at 00:18

## 2018-04-29 RX ADMIN — Medication 50 MILLIGRAM(S): at 06:33

## 2018-04-29 RX ADMIN — PIPERACILLIN AND TAZOBACTAM 200 GRAM(S): 4; .5 INJECTION, POWDER, LYOPHILIZED, FOR SOLUTION INTRAVENOUS at 06:30

## 2018-04-29 RX ADMIN — INSULIN HUMAN 2: 100 INJECTION, SOLUTION SUBCUTANEOUS at 13:32

## 2018-04-29 RX ADMIN — Medication 81 MILLIGRAM(S): at 13:16

## 2018-04-29 RX ADMIN — INSULIN HUMAN 7 UNIT(S): 100 INJECTION, SOLUTION SUBCUTANEOUS at 00:18

## 2018-04-29 RX ADMIN — ATORVASTATIN CALCIUM 80 MILLIGRAM(S): 80 TABLET, FILM COATED ORAL at 22:32

## 2018-04-29 RX ADMIN — INSULIN GLARGINE 10 UNIT(S): 100 INJECTION, SOLUTION SUBCUTANEOUS at 22:32

## 2018-04-29 RX ADMIN — ERGOCALCIFEROL 6000 UNIT(S): 1.25 CAPSULE ORAL at 13:16

## 2018-04-29 RX ADMIN — Medication 50 MILLIGRAM(S): at 23:52

## 2018-04-29 RX ADMIN — Medication 50 MILLIGRAM(S): at 00:12

## 2018-04-29 RX ADMIN — MIDODRINE HYDROCHLORIDE 15 MILLIGRAM(S): 2.5 TABLET ORAL at 13:35

## 2018-04-29 RX ADMIN — LEVETIRACETAM 500 MILLIGRAM(S): 250 TABLET, FILM COATED ORAL at 13:15

## 2018-04-29 RX ADMIN — INSULIN HUMAN 7 UNIT(S): 100 INJECTION, SOLUTION SUBCUTANEOUS at 18:37

## 2018-04-29 RX ADMIN — Medication 1 TABLET(S): at 13:16

## 2018-04-29 RX ADMIN — INSULIN GLARGINE 10 UNIT(S): 100 INJECTION, SOLUTION SUBCUTANEOUS at 22:00

## 2018-04-29 RX ADMIN — INSULIN HUMAN 7 UNIT(S): 100 INJECTION, SOLUTION SUBCUTANEOUS at 13:33

## 2018-04-29 RX ADMIN — Medication 100 MILLIGRAM(S): at 13:34

## 2018-04-29 RX ADMIN — Medication 1 APPLICATION(S): at 13:34

## 2018-04-29 NOTE — PROGRESS NOTE ADULT - PROBLEM SELECTOR PLAN 1
Oligoanuric GILMER due to ischemic ATN requiring RRT.    Plan:   Last HD on 4/28 with 1L UF without albumin. Tolerated procedure well.  Will defer HD treatment today.  Low K/low phos/renal feeding.  Intermittent bladder scan to assess urine output

## 2018-04-29 NOTE — PROGRESS NOTE ADULT - ASSESSMENT
Assessment/Plan:  63M PMH HFrEF 10-15% (ischemic), MI, s/p AICD vs PPM, possible Afib, HTN, DM2 on insulin, possible CKD, and gout, who presented with a chief complaint of generalized weakness s/p septic shock likely 2/2 LE cellulitis complicated by ATN requiring dialysis. Course also c/b AMS likely from brainstem infarct 2/2 LV thrombus and/or toxic metabolic encephalopathy and found to have candidemia and sepsis 2/2 klebsiella bacteremia s/p fluconazole and CTX and de-lining. Transitioned to PO meds through PEG for end-goal of L-TACH at new baseline mental status. Pt continuing on HD with new permacath placed 4/25 and a/c with hep bridge to coumadin for afib and LV thrombus. Currently ventilator dependent tolerating daily CPAP trials and with resloving septic shock from aspiration pneumonia    NEURO  #Toxic Metabolic Encephalopathy- Acute change in mental status likely 2/2 brainstem infarct with component of encephalopathy from infection. 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 Per neurology may have had ischemic event from hypoperfusion. Also showing disc protrusion at C3/C4 level coursing superiorly to the C2/C3 contacting the ventral spinal cord. Per neurology, this may be contributing to weakness, however would not explain change in mental status.    - d/w neurology need for Modafinil   - goal for placement at L-TACH when stabilized  - MRI head repeat for prognostication of CVA- ordered    #Seizures- c/w keppra 500 mg qd with extra 250 mg post HD days    ID  #Pneumonia- suspect aspiration pna from aspiration of feeds given that patient seen coughing up feeds/secretion through trach with residual feeds in stomach. CXR showing new RLL infiltrate.  - c/w Vancomycin (4/27-), dosed by level - ordered for Vancomycin 1250mg today after HD  - c/w Zosyn 2.25 q8hrs (4/27-)  - Bcxs NGTD, f/u sputum cxs      #Klebsiella Bacteremia- Resolved. S/p Ceftriaxone 2g q 24 hrs (4/15-4/26)  #Candidemia with candida tropicalis- Resolved. S/p completion of Fluconazole 200mg q 24hrs (4/13-4/24)    CARDIOVASCULAR   # Septic Shock requiring Levophed- Improving, levophed requirements decreasing.   - c/w Levophed titrated to MAP >65  - c/w Midodrine 15 mg q8h  - c/w Solucortef 50mg q6hrs stress dose steroids    # CHF with EF 10-15% 2/2 ischemic cardiomyopathy s/p AICD. Elevated BNP on admission with R sided effusion and edema. Patient with persistent pleural effusions on CXR and US and b/l dependent edema.   - Maintain negative fluid balance with dialysis  - c/w holding ACE/BB in setting of sepsis/ hypotension    #AFIB- hx of Afib and LV thrombus on coumadin prior to admission; TTE with definity shows no LV thrombus currently   - c/w Heparin gtt  - c/w holding Metoprolol 12.5 q12h given hypotension. Will use Dig for rate control if RVR.    #LV thrombus: RUKHSANA on 4/10 showing obvious thrombus in LV  - c/w hep gtt for AC    #CAD- Hx of MI and ischemic CM s/p AICD, STEMI 7/2017.   - c/w aspirin 81 and Atorvastatin 80mg qhs    PULMONARY   #Chronic Resp failure- S/p tracheostomy 4/5. Bedside US with simple b/l pleural effusions and atelectatic lung. Less concern for infectious source given b/l effusions with no septations/loculations. Likely related to fluid overload. If clinically worsening, can consider thoracentesis for fluid studies and cultures.  - c/w daily CPAP trials, weaning to trach collar as tolerated  - c/w HD to remove excess fluid    RENAL   #Chronic renal failure- likely ischemic ATN in setting of sepsis with low intravascular volume. Unknown baseline Cr presenting with Cr 3.93 with metabolic derangements. Renal team on board, now on intermittent HD. HD catheter removed 4/8 due to fungemia, now with L subclavian HD cath.   - Next HD session today, c/w intermittent HD per renal recs  - patient has NOT required albumin on HD    #Adrenal Insufficiency- previously on fludrocortisone and prednisone. Started on stress dose steroids in setting of acute infection.    #Elevated AG- Fluctuating at low level; lactate negative, glucose has been well controlled, possibly 2/2 uremia in setting of ESRD.   - Monitor BMP    GI   #PEG in place- holding tube feeds in setting of new aspiration PNA and abdominal distension.  - started Reglan 5mg q6hrs   - will reassess abs exam in PM and consider restarting tube feeds at slow rate    ENDOCRINE   #Diabetes- HbA1C 8.6.  - c/w D10 gtt at 70cc/hr while holding tube feeds  - holding regular insulin while holding tube feeds  - c/w lantus 21u qhs  - MISS, monitor FSG and adjust as needed     MSK  #LUE Swelling- LUE US showing soft tissue swelling with no fluid collection. Ortho rec no intervention, supportive care.    F: none  E: Replete K<4 Mg<2, caution in setting of acute renal failure  N: PEG Placed 4/4, holding tube feeds for now   DVT ppx: on heparin gtt  GI ppx: PPI 40 daily  Lines: R permacath (4/25), L IJ (4/28)  Drips: none  Full code  Dispo: MICU Assessment/Plan:  63M PMH HFrEF 10-15% (ischemic), MI, s/p AICD vs PPM, possible Afib, HTN, DM2 on insulin, possible CKD, and gout, who presented with a chief complaint of generalized weakness s/p septic shock likely 2/2 LE cellulitis complicated by ATN requiring dialysis. Course also c/b AMS likely from brainstem infarct 2/2 LV thrombus and/or toxic metabolic encephalopathy and found to have candidemia and sepsis 2/2 klebsiella bacteremia s/p fluconazole and CTX and de-lining. Transitioned to PO meds through PEG for end-goal of L-TACH at new baseline mental status. Pt continuing on HD with new permacath placed 4/25 and a/c with hep bridge to coumadin for afib and LV thrombus. Currently ventilator dependent tolerating daily CPAP trials and with resloving septic shock from aspiration pneumonia    NEURO  #Toxic Metabolic Encephalopathy- Acute change in mental status likely 2/2 brainstem infarct with component of encephalopathy from infection. 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 Per neurology may have had ischemic event from hypoperfusion. Also showing disc protrusion at C3/C4 level coursing superiorly to the C2/C3 contacting the ventral spinal cord. Per neurology, this may be contributing to weakness, however would not explain change in mental status.    - d/w neurology need for Modafinil   - goal for placement at L-TACH when stabilized  - MRI head repeat for prognostication of CVA- ordered    #Seizures- c/w keppra 500 mg qd with extra 250 mg post HD days    ID  #Pneumonia- suspect aspiration pna from aspiration of feeds given that patient seen coughing up feeds/secretion through trach with residual feeds in stomach. CXR showing new RLL infiltrate.  - d/c'd vanc as no evidence of staph on sputum gram stain  - c/w Zosyn 2.25 q8hrs (4/27-)  - Bcxs NGTD, f/u sputum cxs      #Klebsiella Bacteremia- Resolved. S/p Ceftriaxone 2g q 24 hrs (4/15-4/26)  #Candidemia with candida tropicalis- Resolved. S/p completion of Fluconazole 200mg q 24hrs (4/13-4/24)    CARDIOVASCULAR   # Septic Shock requiring Levophed- Improving, levophed requirements decreasing.   - off levophed  - c/w Midodrine 15 mg q8h  - c/w Solucortef 50mg q6hrs stress dose steroids  - will monitor BP and assess tapering stress steroids    # CHF with EF 10-15% 2/2 ischemic cardiomyopathy s/p AICD. Elevated BNP on admission with R sided effusion and edema. Patient with persistent pleural effusions on CXR and US and b/l dependent edema.   - Maintain negative fluid balance with dialysis  - c/w holding ACE/BB in setting of sepsis/ hypotension    #AFIB- hx of Afib and LV thrombus on coumadin prior to admission; TTE with definity shows no LV thrombus currently   - c/w Heparin gtt  - c/w holding Metoprolol 12.5 q12h given hypotension. Will use Dig for rate control if RVR.    #LV thrombus: RUKHSANA on 4/10 showing obvious thrombus in LV  - c/w hep gtt for AC    #CAD- Hx of MI and ischemic CM s/p AICD, STEMI 7/2017.   - c/w aspirin 81 and Atorvastatin 80mg qhs    PULMONARY   #Chronic Resp failure- S/p tracheostomy 4/5. Bedside US with simple b/l pleural effusions and atelectatic lung. Less concern for infectious source given b/l effusions with no septations/loculations. Likely related to fluid overload. If clinically worsening, can consider thoracentesis for fluid studies and cultures.  - c/w daily CPAP trials, weaning to trach collar as tolerated  - c/w HD to remove excess fluid    RENAL   #Chronic renal failure- likely ischemic ATN in setting of sepsis with low intravascular volume. Unknown baseline Cr presenting with Cr 3.93 with metabolic derangements. Renal team on board, now on intermittent HD. HD catheter removed 4/8 due to fungemia, now with L subclavian HD cath.   - Next HD session Tuesday, c/w intermittent HD per renal recs  - patient has NOT required albumin on HD    #Adrenal Insufficiency- previously on fludrocortisone and prednisone. Started on stress dose steroids in setting of acute infection.  - reassess tomorrow tapering steroids  - now off levophed     #Elevated AG- Fluctuating at low level; lactate negative, glucose has been well controlled, possibly 2/2 uremia in setting of ESRD.   - Monitor BMP    GI   #PEG in place- holding tube feeds in setting of new aspiration PNA and abdominal distension.  - c/w Reglan 5mg q6hrs   - restarted feeds at 10cc/hr     ENDOCRINE   #Diabetes- HbA1C 8.6.  - c/w D10 gtt at 70cc/hr; will d/c if FSG stable after resuming feeds  - c/w regular insulin 7units q6h  - c/w lantus 10u qhs  - MISS, monitor FSG and adjust as needed     MSK  #LUE Swelling- LUE US showing soft tissue swelling with no fluid collection. Ortho rec no intervention, supportive care.    F: none  E: Replete K<4 Mg<2, caution in setting of acute renal failure  N: PEG Placed 4/4, holding tube feeds for now   DVT ppx: on heparin gtt  GI ppx: PPI 40 daily  Lines: R permacath (4/25), L IJ (4/28)  Drips: none  Full code  Dispo: MICU Assessment/Plan:  63M PMH HFrEF 10-15% (ischemic), MI, s/p AICD vs PPM, possible Afib, HTN, DM2 on insulin, possible CKD, and gout, who presented with a chief complaint of generalized weakness s/p septic shock likely 2/2 LE cellulitis complicated by ATN requiring dialysis. Course also c/b AMS likely from brainstem infarct 2/2 LV thrombus and/or toxic metabolic encephalopathy and found to have candidemia and sepsis 2/2 klebsiella bacteremia s/p fluconazole and CTX and de-lining. Transitioned to PO meds through PEG for end-goal of L-TACH at new baseline mental status. Pt continuing on HD with new permacath placed 4/25 and a/c with hep bridge to coumadin for afib and LV thrombus. Currently ventilator dependent tolerating daily CPAP trials and with resloving septic shock from aspiration pneumonia    NEURO  #Toxic Metabolic Encephalopathy- Acute change in mental status likely 2/2 brainstem infarct with component of encephalopathy from infection. 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 Per neurology may have had ischemic event from hypoperfusion. Also showing disc protrusion at C3/C4 level coursing superiorly to the C2/C3 contacting the ventral spinal cord. Per neurology, this may be contributing to weakness, however would not explain change in mental status.    - d/w neurology need for Modafinil   - goal for placement at L-TAC when stabilized  - MRI head repeat for prognostication of CVA- ordered    #Seizures- c/w keppra 500 mg qd with extra 250 mg post HD days    ID  #Pneumonia- suspect aspiration pna from aspiration of feeds given that patient seen coughing up feeds/secretion through trach with residual feeds in stomach. CXR showing new RLL infiltrate.  - d/c'd vanc as no evidence of staph on sputum gram stain  - c/w Zosyn 2.25 q8hrs (4/27-)  - Bcxs NGTD, f/u sputum cxs      #Klebsiella Bacteremia- Resolved. S/p Ceftriaxone 2g q 24 hrs (4/15-4/26)  #Candidemia with candida tropicalis- Resolved. S/p completion of Fluconazole 200mg q 24hrs (4/13-4/24)    CARDIOVASCULAR   # Septic Shock requiring Levophed- Improving, levophed requirements decreasing.   - off levophed  - c/w Midodrine 15 mg q8h  - c/w Solucortef 50mg q6hrs stress dose steroids  - will monitor BP and assess tapering stress steroids    # CHF with EF 10-15% 2/2 ischemic cardiomyopathy s/p AICD. Elevated BNP on admission with R sided effusion and edema. Patient with persistent pleural effusions on CXR and US and b/l dependent edema.   - Maintain negative fluid balance with dialysis  - c/w holding ACE/BB in setting of sepsis/ hypotension    #AFIB- hx of Afib and LV thrombus on coumadin prior to admission; TTE with definity shows no LV thrombus currently   - c/w Heparin gtt  - c/w holding Metoprolol 12.5 q12h given hypotension. Will use Dig for rate control if RVR.    #LV thrombus: RUKHSANA on 4/10 showing obvious thrombus in LV  - c/w hep gtt for AC    #CAD- Hx of MI and ischemic CM s/p AICD, STEMI 7/2017.   - c/w aspirin 81 and Atorvastatin 80mg qhs    PULMONARY   #Chronic Resp failure- S/p tracheostomy 4/5. Bedside US with simple b/l pleural effusions and atelectatic lung. Less concern for infectious source given b/l effusions with no septations/loculations. Likely related to fluid overload. If clinically worsening, can consider thoracentesis for fluid studies and cultures.  - c/w daily CPAP trials, weaning to trach collar as tolerated  - c/w HD to remove excess fluid    RENAL   #Chronic renal failure- likely ischemic ATN in setting of sepsis with low intravascular volume. Unknown baseline Cr presenting with Cr 3.93 with metabolic derangements. Renal team on board, now on intermittent HD. HD catheter removed 4/8 due to fungemia, now with L subclavian HD cath.   - Next HD session Tuesday, c/w intermittent HD per renal recs  - patient has NOT required albumin on HD    #Adrenal Insufficiency- previously on fludrocortisone and prednisone. Started on stress dose steroids in setting of acute infection.  - reassess tomorrow tapering steroids  - now off levophed     #Elevated AG- Fluctuating at low level; lactate negative, glucose has been well controlled, possibly 2/2 uremia in setting of ESRD.   - Monitor BMP    GI   #PEG in place- holding tube feeds in setting of new aspiration PNA and abdominal distension.  - c/w Reglan 5mg q6hrs   - restarted feeds at 10cc/hr     ENDOCRINE   #Diabetes- HbA1C 8.6.  - c/w D10 gtt at 70cc/hr; will d/c if FSG stable after resuming feeds  - c/w regular insulin 7units q6h  - c/w lantus 10u qhs  - MISS, monitor FSG and adjust as needed     MSK  #LUE Swelling- LUE US showing soft tissue swelling with no fluid collection. Ortho rec no intervention, supportive care.    F: none  E: Replete K<4 Mg<2, caution in setting of acute renal failure  N: PEG Placed 4/4, holding tube feeds for now   DVT ppx: on heparin gtt  GI ppx: PPI 40 daily  Lines: R permacath (4/25), L IJ (4/28)  Drips: none  Full code  Dispo: MICU

## 2018-04-29 NOTE — PROGRESS NOTE ADULT - PROBLEM SELECTOR PLAN 5
Septic shock on decreased dose of levophed and stress dose steroids  Continue antibiotics as per renal dose adjustment with Crcl<10ml/min

## 2018-04-29 NOTE — PROGRESS NOTE ADULT - PROBLEM SELECTOR PLAN 2
Acute on chronic systolic CHF with LVEF 15 % and severely low blood pressure  Please concentrate as many IV fluid drips as possible  Daily weight  Strict I/o  OPtimal CHF treatment per cardiology/CHF team  Intermittent bladder scan to assess urine.  Plan per primary/cardiology team.

## 2018-04-29 NOTE — PROGRESS NOTE ADULT - SUBJECTIVE AND OBJECTIVE BOX
Patient is a 63y Male seen and evaluated at bedside. Patient lying in bed in no acute distress remains critically ill on ventilatory support as well as minimal IV pressors at present. Last HD treatment on 4/28 with 1L UF. Patient still remain on IV steroids and antibiotics at present for septic shock.    acetaminophen    Suspension. 650 every 6 hours PRN  acetaminophen  Suppository 650 every 6 hours PRN  aspirin  chewable 81 daily  atorvastatin 80 at bedtime  chlorhexidine 0.12% Liquid 15 two times a day  chlorhexidine 2% Cloths 1 daily  dextrose 10%. 1000 <Continuous>  dextrose 5%. 1000 <Continuous>  dextrose 50% Injectable 12.5 once  dextrose 50% Injectable 25 once  dextrose 50% Injectable 25 once  dextrose Gel 1 once PRN  ergocalciferol Drops 6000 daily  escitalopram 5 daily  folic acid 1 daily  glucagon  Injectable 1 once PRN  heparin  Infusion 1500 <Continuous>  hydrocortisone sodium succinate Injectable 50 every 6 hours  insulin glargine Injectable (LANTUS) 10 at bedtime  insulin regular  human corrective regimen sliding scale  every 6 hours  insulin regular  human recombinant 7 every 6 hours  levETIRAcetam  Solution 250 <User Schedule>  levETIRAcetam  Solution 500 every 24 hours  metoclopramide 5 every 6 hours  midodrine 15 every 8 hours  modafinil 100 <User Schedule>  multivitamin 1 daily  pantoprazole   Suspension 40 daily  piperacillin/tazobactam IVPB. 2.25 every 8 hours  psyllium Powder 1 daily  silver sulfADIAZINE 1% Cream 1 daily  thiamine 100 daily      Allergies    No Known Allergies    Intolerances        T(C): , Max: 37.1 (04-28-18 @ 18:21)  T(F): , Max: 98.7 (04-28-18 @ 18:21)  HR: 84 (04-29-18 @ 10:00)  BP: 85/61 (04-29-18 @ 10:00)  BP(mean): 71 (04-29-18 @ 10:00)  RR: 18 (04-29-18 @ 10:00)  SpO2: 100% (04-29-18 @ 10:00)  Wt(kg): --    04-28 @ 07:01  -  04-29 @ 07:00  --------------------------------------------------------  IN: 2362 mL / OUT: 1000 mL / NET: 1362 mL    04-29 @ 07:01  -  04-29 @ 10:29  --------------------------------------------------------  IN: 78 mL / OUT: 0 mL / NET: 78 mL          Review of Systems:  Limited. Patient confused and disoriented unable to follow verbal commands      PHYSICAL EXAM:  GENERAL:Ill appearing, awake but confused on ventilatory support in no acute distress at present  HEAD:  Atraumatic, Normocephalic,   EYES: Bilateral conjuctiva and sclera normal   Oral cavity: Oral mucosa dry and pale  NECK: Neck supple, No JVD  CHEST/LUNG: Bilateral decreased breath sounds at bases with rhonchi+nt, no rales, no creapition, no wheezing  HEART: Regular rate and rhythm. HOMER II/VI at LPSB, No gallop, no rub   ABDOMEN: Soft, Nontender, non distended, BS+nt, No flank tenderness.   EXTREMITIES: Bilateral trace edema with chronic vernous stasis dermatitis, No clubbing, cyanosis  Neurology: AAOx3, no focal neurological deficit  SKIN: No rashes or lesions          ACCESS: Right chest wall tunnel HD catheter present.    LABS:                        7.8    34.0  )-----------( 256      ( 29 Apr 2018 06:11 )             25.1     04-29    135  |  94<L>  |  35<H>  ----------------------------<  96  4.3   |  21<L>  |  2.97<H>    Ca    8.8      29 Apr 2018 06:11  Phos  5.2     04-29  Mg     1.9     04-29    TPro  7.4  /  Alb  3.2<L>  /  TBili  0.8  /  DBili  0.3<H>  /  AST  29  /  ALT  61<H>  /  AlkPhos  327<H>  04-27      PT/INR - ( 29 Apr 2018 06:11 )   PT: 21.3 sec;   INR: 1.89          PTT - ( 29 Apr 2018 06:11 )  PTT:63.3 sec          RADIOLOGY & ADDITIONAL STUDIES:  none

## 2018-04-29 NOTE — PROGRESS NOTE ADULT - SUBJECTIVE AND OBJECTIVE BOX
INTERVAL HPI/OVERNIGHT EVENTS: PTT therapeutic     SUBJECTIVE: Patient seen and examined at bedside.     CONSTITUTIONAL: No weakness, fevers or chills  EYES/ENT: No visual changes;  No vertigo or throat pain   NECK: No pain or stiffness  RESPIRATORY: No cough, wheezing, hemoptysis; No shortness of breath  CARDIOVASCULAR: No chest pain or palpitations  GASTROINTESTINAL: No abdominal or epigastric pain. No nausea, vomiting, or hematemesis; No diarrhea or constipation. No melena or hematochezia.  GENITOURINARY: No dysuria, frequency or hematuria  NEUROLOGICAL: No numbness or weakness  SKIN: No itching, rashes    OBJECTIVE:    VITAL SIGNS:  ICU Vital Signs Last 24 Hrs  T(C): 36.7 (29 Apr 2018 03:27), Max: 37.2 (28 Apr 2018 06:30)  T(F): 98.1 (29 Apr 2018 03:27), Max: 99 (28 Apr 2018 06:30)  HR: 97 (29 Apr 2018 05:50) (88 - 114)  BP: 91/60 (29 Apr 2018 05:00) (76/54 - 102/71)  BP(mean): 77 (29 Apr 2018 05:00) (64 - 85)  ABP: --  ABP(mean): --  RR: 23 (29 Apr 2018 05:50) (9 - 25)  SpO2: 100% (29 Apr 2018 05:50) (92% - 100%)    Mode: CPAP with PS, FiO2: 40, PEEP: 5, PS: 10, MAP: 8, PIP: 15    04-27 @ 07:01 - 04-28 @ 07:00  --------------------------------------------------------  IN: 1204 mL / OUT: 0 mL / NET: 1204 mL    04-28 @ 07:01 - 04-29 @ 06:22  --------------------------------------------------------  IN: 2420 mL / OUT: 1000 mL / NET: 1420 mL      CAPILLARY BLOOD GLUCOSE      POCT Blood Glucose.: 110 mg/dL (28 Apr 2018 23:21)      PHYSICAL EXAM:    General: NAD  HEENT: NC/AT; PERRL, clear conjunctiva  Neck: supple  Respiratory: CTA b/l  Cardiovascular: +S1/S2; RRR  Abdomen: soft, NT/ND; +BS x4  Extremities: WWP, 2+ peripheral pulses b/l; no LE edema  Skin: normal color and turgor; no rash  Neurological:    MEDICATIONS:  MEDICATIONS  (STANDING):  aspirin  chewable 81 milliGRAM(s) Oral daily  atorvastatin 80 milliGRAM(s) Oral at bedtime  chlorhexidine 0.12% Liquid 15 milliLiter(s) Swish and Spit two times a day  chlorhexidine 2% Cloths 1 Application(s) Topical daily  dextrose 10%. 1000 milliLiter(s) (60 mL/Hr) IV Continuous <Continuous>  dextrose 5%. 1000 milliLiter(s) (50 mL/Hr) IV Continuous <Continuous>  dextrose 50% Injectable 12.5 Gram(s) IV Push once  dextrose 50% Injectable 25 Gram(s) IV Push once  dextrose 50% Injectable 25 Gram(s) IV Push once  ergocalciferol Drops 6000 Unit(s) Oral daily  escitalopram 5 milliGRAM(s) Oral daily  folic acid 1 milliGRAM(s) Oral daily  heparin  Infusion 1500 Unit(s)/Hr (15 mL/Hr) IV Continuous <Continuous>  hydrocortisone sodium succinate Injectable 50 milliGRAM(s) IV Push every 6 hours  insulin glargine Injectable (LANTUS) 10 Unit(s) SubCutaneous at bedtime  insulin regular  human corrective regimen sliding scale   SubCutaneous every 6 hours  insulin regular  human recombinant 7 Unit(s) SubCutaneous every 6 hours  levETIRAcetam  Solution 250 milliGRAM(s) Oral <User Schedule>  levETIRAcetam  Solution 500 milliGRAM(s) Oral every 24 hours  metoclopramide 5 milliGRAM(s) Oral every 6 hours  midodrine 15 milliGRAM(s) Oral every 8 hours  modafinil 100 milliGRAM(s) Oral <User Schedule>  multivitamin 1 Tablet(s) Oral daily  norepinephrine Infusion 0.01 MICROgram(s)/kG/Min (1.401 mL/Hr) IV Continuous <Continuous>  pantoprazole   Suspension 40 milliGRAM(s) Oral daily  piperacillin/tazobactam IVPB. 2.25 Gram(s) IV Intermittent every 8 hours  psyllium Powder 1 Packet(s) Oral daily  silver sulfADIAZINE 1% Cream 1 Application(s) Topical daily  thiamine 100 milliGRAM(s) Oral daily    MEDICATIONS  (PRN):  acetaminophen    Suspension. 650 milliGRAM(s) Oral every 6 hours PRN Moderate Pain (4 - 6)  acetaminophen  Suppository 650 milliGRAM(s) Rectal every 6 hours PRN For Temp greater than 38 C (100.4 F)  dextrose Gel 1 Dose(s) Oral once PRN Blood Glucose LESS THAN 70 milliGRAM(s)/deciliter  glucagon  Injectable 1 milliGRAM(s) IntraMuscular once PRN Glucose LESS THAN 70 milligrams/deciliter      ALLERGIES:  Allergies    No Known Allergies    Intolerances        LABS:                        9.5    42.2  )-----------( 259      ( 28 Apr 2018 04:03 )             29.8     04-28    137  |  92<L>  |  48<H>  ----------------------------<  151<H>  4.8   |  20<L>  |  3.61<H>    Ca    9.2      28 Apr 2018 04:03  Phos  7.2     04-28  Mg     2.0     04-28    TPro  7.4  /  Alb  3.2<L>  /  TBili  0.8  /  DBili  0.3<H>  /  AST  29  /  ALT  61<H>  /  AlkPhos  327<H>  04-27    PT/INR - ( 29 Apr 2018 06:11 )   PT: 21.3 sec;   INR: 1.89          PTT - ( 28 Apr 2018 21:12 )  PTT:80.4 sec      RADIOLOGY & ADDITIONAL TESTS: Reviewed. INTERVAL HPI/OVERNIGHT EVENTS: PTT therapeutic; NSVT lasting 4 beats and 9 beats. Asymptomatic    SUBJECTIVE: Patient seen and examined at bedside. NAD. No respiratory distress. Laying comfortably in bed. Responsive to verbal and tactile stimuli. Not following commands. Appearing comfortable. Unable to assess ROS.     OBJECTIVE:    VITAL SIGNS:  ICU Vital Signs Last 24 Hrs  T(C): 36.7 (29 Apr 2018 03:27), Max: 37.2 (28 Apr 2018 06:30)  T(F): 98.1 (29 Apr 2018 03:27), Max: 99 (28 Apr 2018 06:30)  HR: 97 (29 Apr 2018 05:50) (88 - 114)  BP: 91/60 (29 Apr 2018 05:00) (76/54 - 102/71)  BP(mean): 77 (29 Apr 2018 05:00) (64 - 85)  ABP: --  ABP(mean): --  RR: 23 (29 Apr 2018 05:50) (9 - 25)  SpO2: 100% (29 Apr 2018 05:50) (92% - 100%)    Mode: CPAP with PS, FiO2: 40, PEEP: 5, PS: 10, MAP: 8, PIP: 15    04-27 @ 07:01 - 04-28 @ 07:00  --------------------------------------------------------  IN: 1204 mL / OUT: 0 mL / NET: 1204 mL    04-28 @ 07:01 - 04-29 @ 06:22  --------------------------------------------------------  IN: 2420 mL / OUT: 1000 mL / NET: 1420 mL      CAPILLARY BLOOD GLUCOSE      POCT Blood Glucose.: 110 mg/dL (28 Apr 2018 23:21)      PHYSICAL EXAM:    General: NAD, no respiratory distress, laying comfortably in bed, responsive to verbal and tactile stimuli  HEENT: NC/AT; PERRL, clear conjunctiva  Neck: supple, trach in place, R permacath  Respiratory: b/l rhonchi in upper lobes anteriorly, no wheezing or rales, no retractions or labored breathing, on CPAP   Cardiovascular: +S1/S2; RRR, no M/R/G  Abdomen: soft, moderately distended, hypoactive BS, no TTP, no rebound or guarding,  PEG in place C/D/I  Extremities: WWP, 2+ pitting edema to level of thigh b/l, LUE with eschar on medial surface of forearm with surrounding tense edema, +fluctuance  Vasc: 2+ peripheral pulses b/l  Skin: dry, b/l LE with peeling skin, wrinkled/weeping skin of dorsal feet b/l  Neurological: nonverbal, not following commands, extremities rigid and stiff, no spontaneous movement of LUE, small movements of remaining extremities     MEDICATIONS:  MEDICATIONS  (STANDING):  aspirin  chewable 81 milliGRAM(s) Oral daily  atorvastatin 80 milliGRAM(s) Oral at bedtime  chlorhexidine 0.12% Liquid 15 milliLiter(s) Swish and Spit two times a day  chlorhexidine 2% Cloths 1 Application(s) Topical daily  dextrose 10%. 1000 milliLiter(s) (60 mL/Hr) IV Continuous <Continuous>  dextrose 5%. 1000 milliLiter(s) (50 mL/Hr) IV Continuous <Continuous>  dextrose 50% Injectable 12.5 Gram(s) IV Push once  dextrose 50% Injectable 25 Gram(s) IV Push once  dextrose 50% Injectable 25 Gram(s) IV Push once  ergocalciferol Drops 6000 Unit(s) Oral daily  escitalopram 5 milliGRAM(s) Oral daily  folic acid 1 milliGRAM(s) Oral daily  heparin  Infusion 1500 Unit(s)/Hr (15 mL/Hr) IV Continuous <Continuous>  hydrocortisone sodium succinate Injectable 50 milliGRAM(s) IV Push every 6 hours  insulin glargine Injectable (LANTUS) 10 Unit(s) SubCutaneous at bedtime  insulin regular  human corrective regimen sliding scale   SubCutaneous every 6 hours  insulin regular  human recombinant 7 Unit(s) SubCutaneous every 6 hours  levETIRAcetam  Solution 250 milliGRAM(s) Oral <User Schedule>  levETIRAcetam  Solution 500 milliGRAM(s) Oral every 24 hours  metoclopramide 5 milliGRAM(s) Oral every 6 hours  midodrine 15 milliGRAM(s) Oral every 8 hours  modafinil 100 milliGRAM(s) Oral <User Schedule>  multivitamin 1 Tablet(s) Oral daily  norepinephrine Infusion 0.01 MICROgram(s)/kG/Min (1.401 mL/Hr) IV Continuous <Continuous>  pantoprazole   Suspension 40 milliGRAM(s) Oral daily  piperacillin/tazobactam IVPB. 2.25 Gram(s) IV Intermittent every 8 hours  psyllium Powder 1 Packet(s) Oral daily  silver sulfADIAZINE 1% Cream 1 Application(s) Topical daily  thiamine 100 milliGRAM(s) Oral daily    MEDICATIONS  (PRN):  acetaminophen    Suspension. 650 milliGRAM(s) Oral every 6 hours PRN Moderate Pain (4 - 6)  acetaminophen  Suppository 650 milliGRAM(s) Rectal every 6 hours PRN For Temp greater than 38 C (100.4 F)  dextrose Gel 1 Dose(s) Oral once PRN Blood Glucose LESS THAN 70 milliGRAM(s)/deciliter  glucagon  Injectable 1 milliGRAM(s) IntraMuscular once PRN Glucose LESS THAN 70 milligrams/deciliter      ALLERGIES:  Allergies    No Known Allergies    Intolerances        LABS:                        9.5    42.2  )-----------( 259      ( 28 Apr 2018 04:03 )             29.8     04-28    137  |  92<L>  |  48<H>  ----------------------------<  151<H>  4.8   |  20<L>  |  3.61<H>    Ca    9.2      28 Apr 2018 04:03  Phos  7.2     04-28  Mg     2.0     04-28    TPro  7.4  /  Alb  3.2<L>  /  TBili  0.8  /  DBili  0.3<H>  /  AST  29  /  ALT  61<H>  /  AlkPhos  327<H>  04-27    PT/INR - ( 29 Apr 2018 06:11 )   PT: 21.3 sec;   INR: 1.89          PTT - ( 28 Apr 2018 21:12 )  PTT:80.4 sec      RADIOLOGY & ADDITIONAL TESTS: Reviewed.

## 2018-04-30 LAB
-  AMIKACIN: SIGNIFICANT CHANGE UP
-  AMPICILLIN/SULBACTAM: SIGNIFICANT CHANGE UP
-  AMPICILLIN: SIGNIFICANT CHANGE UP
-  CEFAZOLIN: SIGNIFICANT CHANGE UP
-  CEFTRIAXONE: SIGNIFICANT CHANGE UP
-  CIPROFLOXACIN: SIGNIFICANT CHANGE UP
-  GENTAMICIN: SIGNIFICANT CHANGE UP
-  IMIPENEM: SIGNIFICANT CHANGE UP
-  PIPERACILLIN/TAZOBACTAM: SIGNIFICANT CHANGE UP
-  TOBRAMYCIN: SIGNIFICANT CHANGE UP
-  TRIMETHOPRIM/SULFAMETHOXAZOLE: SIGNIFICANT CHANGE UP
ANION GAP SERPL CALC-SCNC: 19 MMOL/L — HIGH (ref 5–17)
APTT BLD: 81.6 SEC — HIGH (ref 27.5–37.4)
APTT BLD: 85.6 SEC — HIGH (ref 27.5–37.4)
APTT BLD: 90.2 SEC — HIGH (ref 27.5–37.4)
BUN SERPL-MCNC: 43 MG/DL — HIGH (ref 7–23)
CALCIUM SERPL-MCNC: 8.9 MG/DL — SIGNIFICANT CHANGE UP (ref 8.4–10.5)
CHLORIDE SERPL-SCNC: 92 MMOL/L — LOW (ref 96–108)
CO2 SERPL-SCNC: 21 MMOL/L — LOW (ref 22–31)
CREAT SERPL-MCNC: 3.58 MG/DL — HIGH (ref 0.5–1.3)
GLUCOSE BLDC GLUCOMTR-MCNC: 142 MG/DL — HIGH (ref 70–99)
GLUCOSE BLDC GLUCOMTR-MCNC: 210 MG/DL — HIGH (ref 70–99)
GLUCOSE BLDC GLUCOMTR-MCNC: 248 MG/DL — HIGH (ref 70–99)
GLUCOSE BLDC GLUCOMTR-MCNC: 270 MG/DL — HIGH (ref 70–99)
GLUCOSE BLDC GLUCOMTR-MCNC: 290 MG/DL — HIGH (ref 70–99)
GLUCOSE BLDC GLUCOMTR-MCNC: 295 MG/DL — HIGH (ref 70–99)
GLUCOSE BLDC GLUCOMTR-MCNC: 342 MG/DL — HIGH (ref 70–99)
GLUCOSE BLDC GLUCOMTR-MCNC: 362 MG/DL — HIGH (ref 70–99)
GLUCOSE SERPL-MCNC: 118 MG/DL — HIGH (ref 70–99)
HCT VFR BLD CALC: 23 % — LOW (ref 39–50)
HGB BLD-MCNC: 7 G/DL — CRITICAL LOW (ref 13–17)
INR BLD: 1.99 — HIGH (ref 0.88–1.16)
LYMPHOCYTES # BLD AUTO: 1 % — LOW (ref 13–44)
MAGNESIUM SERPL-MCNC: 1.9 MG/DL — SIGNIFICANT CHANGE UP (ref 1.6–2.6)
MCHC RBC-ENTMCNC: 28.7 PG — SIGNIFICANT CHANGE UP (ref 27–34)
MCHC RBC-ENTMCNC: 30.4 G/DL — LOW (ref 32–36)
MCV RBC AUTO: 94.3 FL — SIGNIFICANT CHANGE UP (ref 80–100)
METHOD TYPE: SIGNIFICANT CHANGE UP
METHOD TYPE: SIGNIFICANT CHANGE UP
MONOCYTES NFR BLD AUTO: 2 % — SIGNIFICANT CHANGE UP (ref 2–14)
NEUTROPHILS NFR BLD AUTO: 95 % — HIGH (ref 43–77)
PHOSPHATE SERPL-MCNC: 6.1 MG/DL — HIGH (ref 2.5–4.5)
PLATELET # BLD AUTO: 200 K/UL — SIGNIFICANT CHANGE UP (ref 150–400)
POTASSIUM SERPL-MCNC: 4 MMOL/L — SIGNIFICANT CHANGE UP (ref 3.5–5.3)
POTASSIUM SERPL-SCNC: 4 MMOL/L — SIGNIFICANT CHANGE UP (ref 3.5–5.3)
PROTHROM AB SERPL-ACNC: 22.4 SEC — HIGH (ref 9.8–12.7)
RBC # BLD: 2.44 M/UL — LOW (ref 4.2–5.8)
RBC # FLD: 17.2 % — HIGH (ref 10.3–16.9)
SODIUM SERPL-SCNC: 132 MMOL/L — LOW (ref 135–145)
WBC # BLD: 24.7 K/UL — HIGH (ref 3.8–10.5)
WBC # FLD AUTO: 24.7 K/UL — HIGH (ref 3.8–10.5)

## 2018-04-30 PROCEDURE — 99233 SBSQ HOSP IP/OBS HIGH 50: CPT

## 2018-04-30 PROCEDURE — 71045 X-RAY EXAM CHEST 1 VIEW: CPT | Mod: 26

## 2018-04-30 RX ORDER — WARFARIN SODIUM 2.5 MG/1
5 TABLET ORAL ONCE
Qty: 0 | Refills: 0 | Status: COMPLETED | OUTPATIENT
Start: 2018-04-30 | End: 2018-04-30

## 2018-04-30 RX ORDER — INSULIN HUMAN 100 [IU]/ML
1 INJECTION, SOLUTION SUBCUTANEOUS
Qty: 250 | Refills: 0 | Status: DISCONTINUED | OUTPATIENT
Start: 2018-04-30 | End: 2018-05-01

## 2018-04-30 RX ORDER — HEPARIN SODIUM 5000 [USP'U]/ML
1400 INJECTION INTRAVENOUS; SUBCUTANEOUS
Qty: 25000 | Refills: 0 | Status: DISCONTINUED | OUTPATIENT
Start: 2018-04-30 | End: 2018-05-04

## 2018-04-30 RX ORDER — SODIUM CHLORIDE 9 MG/ML
500 INJECTION INTRAMUSCULAR; INTRAVENOUS; SUBCUTANEOUS ONCE
Qty: 0 | Refills: 0 | Status: COMPLETED | OUTPATIENT
Start: 2018-04-30 | End: 2018-04-30

## 2018-04-30 RX ORDER — WARFARIN SODIUM 2.5 MG/1
5 TABLET ORAL ONCE
Qty: 0 | Refills: 0 | Status: DISCONTINUED | OUTPATIENT
Start: 2018-04-30 | End: 2018-04-30

## 2018-04-30 RX ORDER — HYDROCORTISONE 20 MG
50 TABLET ORAL EVERY 8 HOURS
Qty: 0 | Refills: 0 | Status: DISCONTINUED | OUTPATIENT
Start: 2018-04-30 | End: 2018-05-03

## 2018-04-30 RX ADMIN — INSULIN HUMAN 7 UNIT(S): 100 INJECTION, SOLUTION SUBCUTANEOUS at 12:01

## 2018-04-30 RX ADMIN — INSULIN HUMAN 4: 100 INJECTION, SOLUTION SUBCUTANEOUS at 12:01

## 2018-04-30 RX ADMIN — Medication 5 MILLIGRAM(S): at 18:33

## 2018-04-30 RX ADMIN — SODIUM CHLORIDE 1000 MILLILITER(S): 9 INJECTION INTRAMUSCULAR; INTRAVENOUS; SUBCUTANEOUS at 16:30

## 2018-04-30 RX ADMIN — Medication 81 MILLIGRAM(S): at 12:54

## 2018-04-30 RX ADMIN — INSULIN HUMAN 8: 100 INJECTION, SOLUTION SUBCUTANEOUS at 17:19

## 2018-04-30 RX ADMIN — WARFARIN SODIUM 5 MILLIGRAM(S): 2.5 TABLET ORAL at 22:18

## 2018-04-30 RX ADMIN — PIPERACILLIN AND TAZOBACTAM 200 GRAM(S): 4; .5 INJECTION, POWDER, LYOPHILIZED, FOR SOLUTION INTRAVENOUS at 14:23

## 2018-04-30 RX ADMIN — PANTOPRAZOLE SODIUM 40 MILLIGRAM(S): 20 TABLET, DELAYED RELEASE ORAL at 12:53

## 2018-04-30 RX ADMIN — MIDODRINE HYDROCHLORIDE 15 MILLIGRAM(S): 2.5 TABLET ORAL at 14:24

## 2018-04-30 RX ADMIN — MIDODRINE HYDROCHLORIDE 15 MILLIGRAM(S): 2.5 TABLET ORAL at 06:25

## 2018-04-30 RX ADMIN — INSULIN HUMAN 7 UNIT(S): 100 INJECTION, SOLUTION SUBCUTANEOUS at 06:28

## 2018-04-30 RX ADMIN — MODAFINIL 100 MILLIGRAM(S): 200 TABLET ORAL at 12:54

## 2018-04-30 RX ADMIN — ATORVASTATIN CALCIUM 80 MILLIGRAM(S): 80 TABLET, FILM COATED ORAL at 22:21

## 2018-04-30 RX ADMIN — Medication 1 TABLET(S): at 12:53

## 2018-04-30 RX ADMIN — LEVETIRACETAM 500 MILLIGRAM(S): 250 TABLET, FILM COATED ORAL at 10:37

## 2018-04-30 RX ADMIN — MIDODRINE HYDROCHLORIDE 15 MILLIGRAM(S): 2.5 TABLET ORAL at 22:18

## 2018-04-30 RX ADMIN — Medication 1 MILLIGRAM(S): at 12:53

## 2018-04-30 RX ADMIN — PIPERACILLIN AND TAZOBACTAM 200 GRAM(S): 4; .5 INJECTION, POWDER, LYOPHILIZED, FOR SOLUTION INTRAVENOUS at 22:17

## 2018-04-30 RX ADMIN — Medication 50 MILLIGRAM(S): at 06:24

## 2018-04-30 RX ADMIN — INSULIN HUMAN 1 UNIT(S)/HR: 100 INJECTION, SOLUTION SUBCUTANEOUS at 19:17

## 2018-04-30 RX ADMIN — HEPARIN SODIUM 14 UNIT(S)/HR: 5000 INJECTION INTRAVENOUS; SUBCUTANEOUS at 06:49

## 2018-04-30 RX ADMIN — ERGOCALCIFEROL 6000 UNIT(S): 1.25 CAPSULE ORAL at 12:54

## 2018-04-30 RX ADMIN — CHLORHEXIDINE GLUCONATE 15 MILLILITER(S): 213 SOLUTION TOPICAL at 22:18

## 2018-04-30 RX ADMIN — PIPERACILLIN AND TAZOBACTAM 200 GRAM(S): 4; .5 INJECTION, POWDER, LYOPHILIZED, FOR SOLUTION INTRAVENOUS at 06:00

## 2018-04-30 RX ADMIN — Medication 1 APPLICATION(S): at 14:00

## 2018-04-30 RX ADMIN — Medication 100 MILLIGRAM(S): at 12:53

## 2018-04-30 RX ADMIN — CHLORHEXIDINE GLUCONATE 15 MILLILITER(S): 213 SOLUTION TOPICAL at 10:37

## 2018-04-30 RX ADMIN — Medication 50 MILLIGRAM(S): at 22:17

## 2018-04-30 RX ADMIN — Medication 5 MILLIGRAM(S): at 06:25

## 2018-04-30 RX ADMIN — CHLORHEXIDINE GLUCONATE 1 APPLICATION(S): 213 SOLUTION TOPICAL at 06:23

## 2018-04-30 RX ADMIN — Medication 50 MILLIGRAM(S): at 14:24

## 2018-04-30 RX ADMIN — MODAFINIL 100 MILLIGRAM(S): 200 TABLET ORAL at 06:55

## 2018-04-30 RX ADMIN — ESCITALOPRAM OXALATE 5 MILLIGRAM(S): 10 TABLET, FILM COATED ORAL at 12:54

## 2018-04-30 RX ADMIN — Medication 5 MILLIGRAM(S): at 12:53

## 2018-04-30 NOTE — PROGRESS NOTE ADULT - SUBJECTIVE AND OBJECTIVE BOX
Patient seen and examined at bedside. Patient is a 63 year old male with a history of HFrEF 10-15% due to ischemic cardiomyopathy, s/p AICD, ?atrial fibrillation, hypertension, diabetes mellitus type 2, gout, and CKD for whom nephrology was called for [facility] from [facility] requiring hemodialysis. Patient underwent dialysis on 4/28. Patient deteriorated over the weekend and was brought back to the ICU.     acetaminophen    Suspension. 650 milliGRAM(s) every 6 hours PRN  acetaminophen  Suppository 650 milliGRAM(s) every 6 hours PRN  aspirin  chewable 81 milliGRAM(s) daily  atorvastatin 80 milliGRAM(s) at bedtime  chlorhexidine 0.12% Liquid 15 milliLiter(s) two times a day  chlorhexidine 2% Cloths 1 Application(s) daily  dextrose 5%. 1000 milliLiter(s) <Continuous>  dextrose 50% Injectable 12.5 Gram(s) once  dextrose 50% Injectable 25 Gram(s) once  dextrose 50% Injectable 25 Gram(s) once  dextrose Gel 1 Dose(s) once PRN  ergocalciferol Drops 6000 Unit(s) daily  escitalopram 5 milliGRAM(s) daily  folic acid 1 milliGRAM(s) daily  glucagon  Injectable 1 milliGRAM(s) once PRN  heparin  Infusion 1400 Unit(s)/Hr <Continuous>  hydrocortisone sodium succinate Injectable 50 milliGRAM(s) every 8 hours  insulin glargine Injectable (LANTUS) 10 Unit(s) at bedtime  insulin regular  human corrective regimen sliding scale   every 6 hours  insulin regular  human recombinant 7 Unit(s) every 6 hours  levETIRAcetam  Solution 250 milliGRAM(s) <User Schedule>  levETIRAcetam  Solution 500 milliGRAM(s) every 24 hours  metoclopramide 5 milliGRAM(s) every 6 hours  midodrine 15 milliGRAM(s) every 8 hours  modafinil 100 milliGRAM(s) <User Schedule>  multivitamin 1 Tablet(s) daily  pantoprazole   Suspension 40 milliGRAM(s) daily  piperacillin/tazobactam IVPB. 2.25 Gram(s) every 8 hours  psyllium Powder 1 Packet(s) daily  silver sulfADIAZINE 1% Cream 1 Application(s) daily  thiamine 100 milliGRAM(s) daily  warfarin 5 milliGRAM(s) once    Allergies    No Known Allergies    Intolerances    T(C): , Max: 36.7 (04-29-18 @ 22:01)  T(F): , Max: 98.1 (04-29-18 @ 22:01)  HR: 78 (04-30-18 @ 10:00)  BP: 87/56 (04-30-18 @ 10:00)  BP(mean): 71 (04-30-18 @ 10:00)  RR: 20 (04-30-18 @ 10:00)  SpO2: 100% (04-30-18 @ 10:00)    04-29 @ 07:01  -  04-30 @ 07:00  --------------------------------------------------------  IN:    dextrose 10%: 600 mL    Enteral Tube Flush: 270 mL    heparin Infusion: 14 mL    heparin Infusion: 344 mL    norepinephrine Infusion: 3 mL    Other: 15 mL    Solution: 200 mL    Vital HN: 410 mL  Total IN: 1856 mL    OUT:  Total OUT: 0 mL    Total NET: 1856 mL    04-30 @ 07:01  -  04-30 @ 10:46  --------------------------------------------------------  IN:    heparin Infusion: 14 mL    Vital HN: 40 mL  Total IN: 54 mL    OUT:  Total OUT: 0 mL    Total NET: 54 mL    LABS:                        7.0    24.7  )-----------( 200      ( 30 Apr 2018 04:54 )             23.0     04-30    132<L>  |  92<L>  |  43<H>  ----------------------------<  118<H>  4.0   |  21<L>  |  3.58<H>    Ca    8.9      30 Apr 2018 04:54  Phos  6.1     04-30  Mg     1.9     04-30    PT/INR - ( 30 Apr 2018 04:54 )   PT: 22.4 sec;   INR: 1.99       PTT - ( 30 Apr 2018 04:54 )  PTT:90.2 sec

## 2018-04-30 NOTE — PROGRESS NOTE ADULT - ASSESSMENT
Assessment/Plan:  63M PMH HFrEF 10-15% (ischemic), MI, s/p AICD vs PPM, possible Afib, HTN, DM2 on insulin, possible CKD, and gout, who presented with a chief complaint of generalized weakness s/p septic shock likely 2/2 LE cellulitis complicated by ATN requiring dialysis. Course also c/b AMS likely from brainstem infarct 2/2 LV thrombus and/or toxic metabolic encephalopathy and found to have candidemia and sepsis 2/2 klebsiella bacteremia s/p fluconazole and CTX and de-lining. Transitioned to PO meds through PEG for end-goal of L-TACH at new baseline mental status. Pt continuing on HD with new permacath placed 4/25 and a/c with hep bridge to coumadin for afib and LV thrombus. Currently ventilator dependent tolerating daily CPAP trials and with resloving septic shock from aspiration pneumonia    NEURO  #Toxic Metabolic Encephalopathy- Acute change in mental status likely 2/2 brainstem infarct with component of encephalopathy from infection. 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 Per neurology may have had ischemic event from hypoperfusion. Also showing disc protrusion at C3/C4 level coursing superiorly to the C2/C3 contacting the ventral spinal cord. Per neurology, this may be contributing to weakness, however would not explain change in mental status.    - d/w neurology need for Modafinil   - goal for placement at L-TAC when stabilized  - MRI head repeat for prognostication of CVA- ordered    #Seizures- c/w keppra 500 mg qd with extra 250 mg post HD days    ID  #Pneumonia- suspect aspiration pna from aspiration of feeds given that patient seen coughing up feeds/secretion through trach with residual feeds in stomach. CXR showing new RLL infiltrate.  - d/c'd vanc as no evidence of staph on sputum gram stain  - c/w Zosyn 2.25 q8hrs (4/27-)  - Bcxs NGTD, f/u sputum cxs      #Klebsiella Bacteremia- Resolved. S/p Ceftriaxone 2g q 24 hrs (4/15-4/26)  #Candidemia with candida tropicalis- Resolved. S/p completion of Fluconazole 200mg q 24hrs (4/13-4/24)    CARDIOVASCULAR   # Septic Shock requiring Levophed- Improving, levophed requirements decreasing.   - off levophed  - c/w Midodrine 15 mg q8h  - c/w Solucortef 50mg q6hrs stress dose steroids  - will monitor BP and assess tapering stress steroids    # CHF with EF 10-15% 2/2 ischemic cardiomyopathy s/p AICD. Elevated BNP on admission with R sided effusion and edema. Patient with persistent pleural effusions on CXR and US and b/l dependent edema.   - Maintain negative fluid balance with dialysis  - c/w holding ACE/BB in setting of sepsis/ hypotension    #AFIB- hx of Afib and LV thrombus on coumadin prior to admission; TTE with definity shows no LV thrombus currently   - c/w Heparin gtt  - c/w holding Metoprolol 12.5 q12h given hypotension. Will use Dig for rate control if RVR.    #LV thrombus: RUKHSANA on 4/10 showing obvious thrombus in LV  - c/w hep gtt for AC    #CAD- Hx of MI and ischemic CM s/p AICD, STEMI 7/2017.   - c/w aspirin 81 and Atorvastatin 80mg qhs    PULMONARY   #Chronic Resp failure- S/p tracheostomy 4/5. Bedside US with simple b/l pleural effusions and atelectatic lung. Less concern for infectious source given b/l effusions with no septations/loculations. Likely related to fluid overload. If clinically worsening, can consider thoracentesis for fluid studies and cultures.  - c/w daily CPAP trials, weaning to trach collar as tolerated  - c/w HD to remove excess fluid    RENAL   #Chronic renal failure- likely ischemic ATN in setting of sepsis with low intravascular volume. Unknown baseline Cr presenting with Cr 3.93 with metabolic derangements. Renal team on board, now on intermittent HD. HD catheter removed 4/8 due to fungemia, now with L subclavian HD cath.   - Next HD session Tuesday, c/w intermittent HD per renal recs  - patient has NOT required albumin on HD    #Adrenal Insufficiency- previously on fludrocortisone and prednisone. Started on stress dose steroids in setting of acute infection.  - reassess tomorrow tapering steroids  - now off levophed     #Elevated AG- Fluctuating at low level; lactate negative, glucose has been well controlled, possibly 2/2 uremia in setting of ESRD.   - Monitor BMP    GI   #PEG in place- holding tube feeds in setting of new aspiration PNA and abdominal distension.  - c/w Reglan 5mg q6hrs   - restarted feeds at 10cc/hr     ENDOCRINE   #Diabetes- HbA1C 8.6.  - c/w D10 gtt at 70cc/hr; will d/c if FSG stable after resuming feeds  - c/w regular insulin 7units q6h  - c/w lantus 10u qhs  - MISS, monitor FSG and adjust as needed     MSK  #LUE Swelling- LUE US showing soft tissue swelling with no fluid collection. Ortho rec no intervention, supportive care.    F: none  E: Replete K<4 Mg<2, caution in setting of acute renal failure  N: PEG Placed 4/4, holding tube feeds for now   DVT ppx: on heparin gtt  GI ppx: PPI 40 daily  Lines: R permacath (4/25), L IJ (4/28)  Drips: none  Full code  Dispo: MICU Assessment/Plan:  63M PMH HFrEF 10-15% (ischemic), MI, s/p AICD vs PPM, possible Afib, HTN, DM2 on insulin, possible CKD, and gout, who presented with a chief complaint of generalized weakness s/p septic shock likely 2/2 LE cellulitis complicated by ATN requiring dialysis. Course also c/b AMS likely from brainstem infarct 2/2 LV thrombus and/or toxic metabolic encephalopathy and found to have candidemia and sepsis 2/2 klebsiella bacteremia s/p fluconazole and CTX and de-lining. Transitioned to PO meds through PEG for end-goal of L-TACH at new baseline mental status. Pt continuing on HD with new permacath placed 4/25 and a/c with hep bridge to coumadin for afib and LV thrombus. Currently ventilator dependent tolerating daily CPAP trials and with resolving septic shock from aspiration pneumonia.     NEURO  #Toxic Metabolic Encephalopathy- Acute change in mental status likely 2/2 brainstem infarct with component of encephalopathy from infection. 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 Per neurology may have had ischemic event from hypoperfusion. Also showing disc protrusion at C3/C4 level coursing superiorly to the C2/C3 contacting the ventral spinal cord. Per neurology, this may be contributing to weakness, however would not explain change in mental status.    - d/w neurology need for Modafinil   - goal for placement at L-TAC when stabilized  - MRI head repeat for prognostication of CVA- ordered    #Seizures- c/w keppra 500 mg qd with extra 250 mg post HD days    ID  #Pneumonia- suspect aspiration pna from aspiration of feeds given that patient seen coughing up feeds/secretion through trach with residual feeds in stomach. CXR showing new RLL infiltrate. D/c'd vanc as no evidence of staph on sputum gram stain  - c/w Zosyn 2.25 q8hrs (4/27-)  - Bcxs NGTD  - sputum cxs  growing Kleb, f/u sensitivities     #Klebsiella Bacteremia- Resolved. S/p Ceftriaxone 2g q 24 hrs (4/15-4/26)  #Candidemia with candida tropicalis- Resolved. S/p completion of Fluconazole 200mg q 24hrs (4/13-4/24)    CARDIOVASCULAR   # Septic Shock requiring Levophed- Improving, levophed requirements decreasing. Now off pressors.  - c/w Midodrine 15 mg q8h  - c/w Solucortef 50mg q6hrs stress dose steroids  - assess tapering stress steroids BP tolerating     # CHF with EF 10-15% 2/2 ischemic cardiomyopathy s/p AICD. Elevated BNP on admission with R sided effusion and edema. Patient with persistent pleural effusions on CXR and US and b/l dependent edema.   - Maintain negative fluid balance with dialysis  - c/w holding ACE/BB in setting of sepsis/ hypotension    #AFIB- hx of Afib and LV thrombus on coumadin prior to admission; TTE with definity shows no LV thrombus currently   - c/w Heparin gtt  - c/w holding Metoprolol 12.5 q12h given hypotension. Will use Dig for rate control if RVR.    #LV thrombus: RUKHSANA on 4/10 showing obvious thrombus in LV  - c/w hep gtt for AC    #CAD- Hx of MI and ischemic CM s/p AICD, STEMI 7/2017.   - c/w aspirin 81 and Atorvastatin 80mg qhs    PULMONARY   #Chronic Resp failure- S/p tracheostomy 4/5. Bedside US with simple b/l pleural effusions and atelectatic lung. Less concern for infectious source given b/l effusions with no septations/loculations. Likely related to fluid overload. If clinically worsening, can consider thoracentesis for fluid studies and cultures.  - c/w daily CPAP trials, weaning to trach collar as tolerated  - c/w HD to remove excess fluid    RENAL   #Chronic renal failure- likely ischemic ATN in setting of sepsis with low intravascular volume. Unknown baseline Cr presenting with Cr 3.93 with metabolic derangements. Renal team on board, now on intermittent HD. HD catheter removed 4/8 due to fungemia, now with L subclavian HD cath.   - Next HD session likely Tuesday 5/1, c/w intermittent HD per renal recs  - patient has NOT required albumin on HD    #Adrenal Insufficiency- previously on fludrocortisone and prednisone. Started on stress dose steroids in setting of acute infection.  - reassess tapering steroids as now off pressors    #Elevated AG- Fluctuating at low level; lactate negative, glucose has been well controlled, possibly 2/2 uremia in setting of ESRD.   - Monitor BMP    GI   #PEG in place- holding tube feeds in setting of new aspiration PNA and abdominal distension.  - c/w Reglan 5mg q6hrs   - c/w tube feeds    ENDOCRINE   #Diabetes- HbA1C 8.6.  - c/w regular insulin 7units q6h  - c/w lantus 10u qhs  - MISS, monitor FSG and adjust as needed     MSK  #LUE Swelling- LUE US showing soft tissue swelling with no fluid collection. Ortho rec no intervention, supportive care.    F: none  E: Replete K<4 Mg<2, caution in setting of acute renal failure  N: PEG Placed 4/4, holding tube feeds for now   DVT ppx: on heparin gtt  GI ppx: PPI 40 daily  Lines: R permacath (4/25), L IJ (4/28)  Drips: none  Full code  Dispo: MICU Assessment/Plan:  63M PMH HFrEF 10-15% (ischemic), MI, s/p AICD vs PPM, possible Afib, HTN, DM2 on insulin, possible CKD, and gout, who presented with a chief complaint of generalized weakness s/p septic shock likely 2/2 LE cellulitis complicated by ATN requiring dialysis. Course also c/b AMS likely from brainstem infarct 2/2 LV thrombus and/or toxic metabolic encephalopathy and found to have candidemia and sepsis 2/2 klebsiella bacteremia s/p fluconazole and CTX and de-lining. Transitioned to PO meds through PEG for end-goal of L-TACH at new baseline mental status. Pt continuing on HD with new permacath placed 4/25 and a/c with hep bridge to coumadin for afib and LV thrombus. Currently ventilator dependent tolerating daily CPAP trials and with resolving septic shock from aspiration pneumonia.     NEURO  #Toxic Metabolic Encephalopathy- Acute change in mental status likely 2/2 brainstem infarct with component of encephalopathy from infection. 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 Per neurology may have had ischemic event from hypoperfusion. Also showing disc protrusion at C3/C4 level coursing superiorly to the C2/C3 contacting the ventral spinal cord. Per neurology, this may be contributing to weakness, however would not explain change in mental status.    - d/w neurology need for Modafinil   - goal for placement at L-TAC when stabilized  - MRI head repeat for prognostication of CVA- ordered    #Seizures- c/w keppra 500 mg qd with extra 250 mg post HD days    ID  #Pneumonia- suspect aspiration pna from aspiration of feeds given that patient seen coughing up feeds/secretion through trach with residual feeds in stomach. CXR showing new RLL infiltrate. D/c'd vanc as no evidence of staph on sputum gram stain  - c/w Zosyn 2.25 q8hrs (4/27-)  - Bcxs NGTD  - sputum cxs  growing Kleb sensitive to zosyn    #Klebsiella Bacteremia- Resolved. S/p Ceftriaxone 2g q 24 hrs (4/15-4/26)  #Candidemia with candida tropicalis- Resolved. S/p completion of Fluconazole 200mg q 24hrs (4/13-4/24)    CARDIOVASCULAR   # Septic Shock requiring Levophed- Improving, levophed requirements decreasing. Now off pressors.  - c/w Midodrine 15 mg q8h  - tapering to Solucortef 50mg q8hrs stress dose steroids    # CHF with EF 10-15% 2/2 ischemic cardiomyopathy s/p AICD. Elevated BNP on admission with R sided effusion and edema. Patient with persistent pleural effusions on CXR and US and b/l dependent edema.   - Maintain negative fluid balance with dialysis  - c/w holding ACE/BB in setting of sepsis/ hypotension    #AFIB- hx of Afib and LV thrombus on coumadin prior to admission; TTE with definity shows no LV thrombus currently   - c/w Heparin gtt  - c/w holding Metoprolol 12.5 q12h given hypotension. Will use Dig for rate control if RVR  - start bridging to coumadin    #LV thrombus: RUKHSANA on 4/10 showing obvious thrombus in LV  - c/w hep gtt for AC with bridging to coumadin    #CAD- Hx of MI and ischemic CM s/p AICD, STEMI 7/2017.   - c/w aspirin 81 and Atorvastatin 80mg qhs    PULMONARY   #Chronic Resp failure- S/p tracheostomy 4/5. Bedside US with simple b/l pleural effusions and atelectatic lung. Less concern for infectious source given b/l effusions with no septations/loculations. Likely related to fluid overload. If clinically worsening, can consider thoracentesis for fluid studies and cultures.  - c/w daily CPAP trials, weaning to trach collar as tolerated  - c/w HD to remove excess fluid    RENAL   #Chronic renal failure- likely ischemic ATN in setting of sepsis with low intravascular volume. Unknown baseline Cr presenting with Cr 3.93 with metabolic derangements. Renal team on board, now on intermittent HD. HD catheter removed 4/8 due to fungemia, now with L subclavian HD cath.   - Next HD session likely Tuesday 5/1, c/w intermittent HD per renal recs  - patient has NOT required albumin on HD    #Adrenal Insufficiency- previously on fludrocortisone and prednisone. Started on stress dose steroids in setting of acute infection.  - tapering steroids as now off pressors to 50q8    #Elevated AG- Fluctuating at low level; lactate negative, glucose has been well controlled, possibly 2/2 uremia in setting of ESRD.   - Monitor BMP    HEME  #Anemia: likely ACD given ESRD on HD; b/l Hb inpatient has been 7-8; no active S&S bleeding  -Hb today 7  -transfuse 1 units  -trend CBC  -active type screen  -goal Hb>7    GI   #PEG in place- holding tube feeds in setting of new aspiration PNA and abdominal distension.  - c/w Reglan 5mg q6hrs   - c/w tube feeds    ENDOCRINE   #Diabetes- HbA1C 8.6.  - c/w regular insulin 7units q6h  - c/w lantus 10u qhs  - MISS, monitor FSG and adjust as needed     MSK  #LUE Swelling- LUE US showing soft tissue swelling with no fluid collection. Ortho rec no intervention, supportive care.  -amended LUE US read showing cannot r/o evolving abscess although not visible fluid collection  -will f/u ortho hand recs     F: none  E: Replete K<4 Mg<2, caution in setting of acute renal failure  N: PEG Placed 4/4, holding tube feeds for now   DVT ppx: on heparin gtt brdiging to coumadin  GI ppx: PPI 40 daily  Lines: R permacath (4/25), L IJ (4/28)  Drips: none  Full code  Dispo: MICU

## 2018-04-30 NOTE — PROGRESS NOTE ADULT - SUBJECTIVE AND OBJECTIVE BOX
INTERVAL HPI/OVERNIGHT EVENTS: AM Hgb 7.0. Ordered T&S. PTT of 90.2. Decreased heparin gtt to 14ml/h.     SUBJECTIVE: Patient seen and examined at bedside.     CONSTITUTIONAL: No weakness, fevers or chills  EYES/ENT: No visual changes;  No vertigo or throat pain   NECK: No pain or stiffness  RESPIRATORY: No cough, wheezing, hemoptysis; No shortness of breath  CARDIOVASCULAR: No chest pain or palpitations  GASTROINTESTINAL: No abdominal or epigastric pain. No nausea, vomiting, or hematemesis; No diarrhea or constipation. No melena or hematochezia.  GENITOURINARY: No dysuria, frequency or hematuria  NEUROLOGICAL: No numbness or weakness  SKIN: No itching, rashes    OBJECTIVE:    VITAL SIGNS:  ICU Vital Signs Last 24 Hrs  T(C): 36.2 (30 Apr 2018 01:32), Max: 36.7 (29 Apr 2018 22:01)  T(F): 97.2 (30 Apr 2018 01:32), Max: 98.1 (29 Apr 2018 22:01)  HR: 74 (30 Apr 2018 06:00) (74 - 94)  BP: 81/59 (30 Apr 2018 06:00) (77/55 - 95/67)  BP(mean): 69 (30 Apr 2018 06:00) (64 - 79)  ABP: --  ABP(mean): --  RR: 13 (30 Apr 2018 06:00) (10 - 26)  SpO2: 100% (30 Apr 2018 06:00) (97% - 100%)    Mode: AC/ CMV (Assist Control/ Continuous Mandatory Ventilation), RR (machine): 10, TV (machine): 500, FiO2: 40, PEEP: 5, ITime: 1, MAP: 7.4, PIP: 19    04-28 @ 07:01 - 04-29 @ 07:00  --------------------------------------------------------  IN: 2362 mL / OUT: 1000 mL / NET: 1362 mL    04-29 @ 07:01 - 04-30 @ 06:21  --------------------------------------------------------  IN: 1513 mL / OUT: 0 mL / NET: 1513 mL      CAPILLARY BLOOD GLUCOSE      POCT Blood Glucose.: 146 mg/dL (29 Apr 2018 23:04)      PHYSICAL EXAM:    General: NAD  HEENT: NC/AT; PERRL, clear conjunctiva  Neck: supple  Respiratory: CTA b/l  Cardiovascular: +S1/S2; RRR  Abdomen: soft, NT/ND; +BS x4  Extremities: WWP, 2+ peripheral pulses b/l; no LE edema  Skin: normal color and turgor; no rash  Neurological:    MEDICATIONS:  MEDICATIONS  (STANDING):  aspirin  chewable 81 milliGRAM(s) Oral daily  atorvastatin 80 milliGRAM(s) Oral at bedtime  chlorhexidine 0.12% Liquid 15 milliLiter(s) Swish and Spit two times a day  chlorhexidine 2% Cloths 1 Application(s) Topical daily  dextrose 5%. 1000 milliLiter(s) (50 mL/Hr) IV Continuous <Continuous>  dextrose 50% Injectable 12.5 Gram(s) IV Push once  dextrose 50% Injectable 25 Gram(s) IV Push once  dextrose 50% Injectable 25 Gram(s) IV Push once  ergocalciferol Drops 6000 Unit(s) Oral daily  escitalopram 5 milliGRAM(s) Oral daily  folic acid 1 milliGRAM(s) Oral daily  heparin  Infusion 1400 Unit(s)/Hr (14 mL/Hr) IV Continuous <Continuous>  hydrocortisone sodium succinate Injectable 50 milliGRAM(s) IV Push every 6 hours  insulin glargine Injectable (LANTUS) 10 Unit(s) SubCutaneous at bedtime  insulin regular  human corrective regimen sliding scale   SubCutaneous every 6 hours  insulin regular  human recombinant 7 Unit(s) SubCutaneous every 6 hours  levETIRAcetam  Solution 250 milliGRAM(s) Oral <User Schedule>  levETIRAcetam  Solution 500 milliGRAM(s) Oral every 24 hours  metoclopramide 5 milliGRAM(s) Oral every 6 hours  midodrine 15 milliGRAM(s) Oral every 8 hours  modafinil 100 milliGRAM(s) Oral <User Schedule>  multivitamin 1 Tablet(s) Oral daily  pantoprazole   Suspension 40 milliGRAM(s) Oral daily  piperacillin/tazobactam IVPB. 2.25 Gram(s) IV Intermittent every 8 hours  psyllium Powder 1 Packet(s) Oral daily  silver sulfADIAZINE 1% Cream 1 Application(s) Topical daily  thiamine 100 milliGRAM(s) Oral daily    MEDICATIONS  (PRN):  acetaminophen    Suspension. 650 milliGRAM(s) Oral every 6 hours PRN Moderate Pain (4 - 6)  acetaminophen  Suppository 650 milliGRAM(s) Rectal every 6 hours PRN For Temp greater than 38 C (100.4 F)  dextrose Gel 1 Dose(s) Oral once PRN Blood Glucose LESS THAN 70 milliGRAM(s)/deciliter  glucagon  Injectable 1 milliGRAM(s) IntraMuscular once PRN Glucose LESS THAN 70 milligrams/deciliter      ALLERGIES:  Allergies    No Known Allergies    Intolerances        LABS:                        7.0    24.7  )-----------( 200      ( 30 Apr 2018 04:54 )             23.0     04-30    132<L>  |  92<L>  |  43<H>  ----------------------------<  118<H>  4.0   |  21<L>  |  3.58<H>    Ca    8.9      30 Apr 2018 04:54  Phos  6.1     04-30  Mg     1.9     04-30      PT/INR - ( 29 Apr 2018 06:11 )   PT: 21.3 sec;   INR: 1.89          PTT - ( 30 Apr 2018 04:54 )  PTT:90.2 sec      RADIOLOGY & ADDITIONAL TESTS: Reviewed. INTERVAL HPI/OVERNIGHT EVENTS: AM Hgb 7.0. Ordered T&S. PTT of 90.2. Decreased heparin gtt to 14ml/h.     SUBJECTIVE: Patient seen and examined at bedside. NAD. No respiratory distress. Resting comfortably in bed. Arousable to verbal and tactile stimuli. Not following commands. Unable to assess ROS.     CONSTITUTIONAL: No weakness, fevers or chills  EYES/ENT: No visual changes;  No vertigo or throat pain   NECK: No pain or stiffness  RESPIRATORY: No cough, wheezing, hemoptysis; No shortness of breath  CARDIOVASCULAR: No chest pain or palpitations  GASTROINTESTINAL: No abdominal or epigastric pain. No nausea, vomiting, or hematemesis; No diarrhea or constipation. No melena or hematochezia.  GENITOURINARY: No dysuria, frequency or hematuria  NEUROLOGICAL: No numbness or weakness  SKIN: No itching, rashes    OBJECTIVE:    VITAL SIGNS:  ICU Vital Signs Last 24 Hrs  T(C): 36.2 (30 Apr 2018 01:32), Max: 36.7 (29 Apr 2018 22:01)  T(F): 97.2 (30 Apr 2018 01:32), Max: 98.1 (29 Apr 2018 22:01)  HR: 74 (30 Apr 2018 06:00) (74 - 94)  BP: 81/59 (30 Apr 2018 06:00) (77/55 - 95/67)  BP(mean): 69 (30 Apr 2018 06:00) (64 - 79)  ABP: --  ABP(mean): --  RR: 13 (30 Apr 2018 06:00) (10 - 26)  SpO2: 100% (30 Apr 2018 06:00) (97% - 100%)    Mode: AC/ CMV (Assist Control/ Continuous Mandatory Ventilation), RR (machine): 10, TV (machine): 500, FiO2: 40, PEEP: 5, ITime: 1, MAP: 7.4, PIP: 19    04-28 @ 07:01  -  04-29 @ 07:00  --------------------------------------------------------  IN: 2362 mL / OUT: 1000 mL / NET: 1362 mL    04-29 @ 07:01  -  04-30 @ 06:21  --------------------------------------------------------  IN: 1513 mL / OUT: 0 mL / NET: 1513 mL      CAPILLARY BLOOD GLUCOSE      POCT Blood Glucose.: 146 mg/dL (29 Apr 2018 23:04)      PHYSICAL EXAM:    General: NAD, no respiratory distress, laying comfortably in bed, responsive to verbal and tactile stimuli  HEENT: NC/AT; PERRL, clear conjunctiva  Neck: supple, trach in place, R permacath  Respiratory: course breath sounds in upper lobes anteriorly, no wheezing or rales, no retractions or labored breathing, on AC/VC ventilation   Cardiovascular: +S1/S2; RRR, no M/R/G  Abdomen: soft, moderately distended, hypoactive BS, no TTP, no rebound or guarding,  PEG in place C/D/I  Extremities: WWP, 2+ pitting edema to level of thigh b/l, LUE with eschar on medial surface of forearm with surrounding tense edema, +fluctuance  Vasc: 2+ peripheral pulses b/l  Skin: dry, b/l LE with peeling skin, wrinkled/weeping skin of dorsal feet b/l  Neurological: not following commands, extremities rigid and stiff, no spontaneous movement of LUE, small movements of remaining extremities, RUE flexed      MEDICATIONS:  MEDICATIONS  (STANDING):  aspirin  chewable 81 milliGRAM(s) Oral daily  atorvastatin 80 milliGRAM(s) Oral at bedtime  chlorhexidine 0.12% Liquid 15 milliLiter(s) Swish and Spit two times a day  chlorhexidine 2% Cloths 1 Application(s) Topical daily  dextrose 5%. 1000 milliLiter(s) (50 mL/Hr) IV Continuous <Continuous>  dextrose 50% Injectable 12.5 Gram(s) IV Push once  dextrose 50% Injectable 25 Gram(s) IV Push once  dextrose 50% Injectable 25 Gram(s) IV Push once  ergocalciferol Drops 6000 Unit(s) Oral daily  escitalopram 5 milliGRAM(s) Oral daily  folic acid 1 milliGRAM(s) Oral daily  heparin  Infusion 1400 Unit(s)/Hr (14 mL/Hr) IV Continuous <Continuous>  hydrocortisone sodium succinate Injectable 50 milliGRAM(s) IV Push every 6 hours  insulin glargine Injectable (LANTUS) 10 Unit(s) SubCutaneous at bedtime  insulin regular  human corrective regimen sliding scale   SubCutaneous every 6 hours  insulin regular  human recombinant 7 Unit(s) SubCutaneous every 6 hours  levETIRAcetam  Solution 250 milliGRAM(s) Oral <User Schedule>  levETIRAcetam  Solution 500 milliGRAM(s) Oral every 24 hours  metoclopramide 5 milliGRAM(s) Oral every 6 hours  midodrine 15 milliGRAM(s) Oral every 8 hours  modafinil 100 milliGRAM(s) Oral <User Schedule>  multivitamin 1 Tablet(s) Oral daily  pantoprazole   Suspension 40 milliGRAM(s) Oral daily  piperacillin/tazobactam IVPB. 2.25 Gram(s) IV Intermittent every 8 hours  psyllium Powder 1 Packet(s) Oral daily  silver sulfADIAZINE 1% Cream 1 Application(s) Topical daily  thiamine 100 milliGRAM(s) Oral daily    MEDICATIONS  (PRN):  acetaminophen    Suspension. 650 milliGRAM(s) Oral every 6 hours PRN Moderate Pain (4 - 6)  acetaminophen  Suppository 650 milliGRAM(s) Rectal every 6 hours PRN For Temp greater than 38 C (100.4 F)  dextrose Gel 1 Dose(s) Oral once PRN Blood Glucose LESS THAN 70 milliGRAM(s)/deciliter  glucagon  Injectable 1 milliGRAM(s) IntraMuscular once PRN Glucose LESS THAN 70 milligrams/deciliter      ALLERGIES:  Allergies    No Known Allergies    Intolerances        LABS:                        7.0    24.7  )-----------( 200      ( 30 Apr 2018 04:54 )             23.0     04-30    132<L>  |  92<L>  |  43<H>  ----------------------------<  118<H>  4.0   |  21<L>  |  3.58<H>    Ca    8.9      30 Apr 2018 04:54  Phos  6.1     04-30  Mg     1.9     04-30      PT/INR - ( 29 Apr 2018 06:11 )   PT: 21.3 sec;   INR: 1.89          PTT - ( 30 Apr 2018 04:54 )  PTT:90.2 sec      RADIOLOGY & ADDITIONAL TESTS: Reviewed.

## 2018-04-30 NOTE — PROGRESS NOTE ADULT - PROBLEM SELECTOR PLAN 1
Patient is a 63  year old male with acute on chronic renal failure from ischemic ATN. Patient is currently requiring hemodialysis. Patient was last dialyzed 4/28    P - No need for emergent dialysis today as electrolytes are acceptable   Anticipate next dialysis on 5/1

## 2018-05-01 LAB
-  AMIKACIN: SIGNIFICANT CHANGE UP
-  AZTREONAM: SIGNIFICANT CHANGE UP
-  CEFEPIME: SIGNIFICANT CHANGE UP
-  CEFOTAXIME: SIGNIFICANT CHANGE UP
-  CEFTAZIDIME: SIGNIFICANT CHANGE UP
-  CEFTRIAXONE: SIGNIFICANT CHANGE UP
-  CIPROFLOXACIN: SIGNIFICANT CHANGE UP
-  GENTAMICIN: SIGNIFICANT CHANGE UP
-  IMIPENEM: SIGNIFICANT CHANGE UP
-  LEVOFLOXACIN: SIGNIFICANT CHANGE UP
-  MEROPENEM: SIGNIFICANT CHANGE UP
-  PIPERACILLIN/TAZOBACTAM: SIGNIFICANT CHANGE UP
-  TOBRAMYCIN: SIGNIFICANT CHANGE UP
-  TRIMETHOPRIM/SULFAMETHOXAZOLE: SIGNIFICANT CHANGE UP
ANION GAP SERPL CALC-SCNC: 22 MMOL/L — HIGH (ref 5–17)
APTT BLD: 71.6 SEC — HIGH (ref 27.5–37.4)
BASOPHILS NFR BLD AUTO: 0 % — SIGNIFICANT CHANGE UP (ref 0–2)
BUN SERPL-MCNC: 57 MG/DL — HIGH (ref 7–23)
CALCIUM SERPL-MCNC: 8.8 MG/DL — SIGNIFICANT CHANGE UP (ref 8.4–10.5)
CHLORIDE SERPL-SCNC: 91 MMOL/L — LOW (ref 96–108)
CO2 SERPL-SCNC: 20 MMOL/L — LOW (ref 22–31)
CREAT SERPL-MCNC: 3.93 MG/DL — HIGH (ref 0.5–1.3)
CULTURE RESULTS: SIGNIFICANT CHANGE UP
EOSINOPHIL NFR BLD AUTO: 0 % — SIGNIFICANT CHANGE UP (ref 0–6)
GLUCOSE BLDC GLUCOMTR-MCNC: 150 MG/DL — HIGH (ref 70–99)
GLUCOSE BLDC GLUCOMTR-MCNC: 167 MG/DL — HIGH (ref 70–99)
GLUCOSE BLDC GLUCOMTR-MCNC: 209 MG/DL — HIGH (ref 70–99)
GLUCOSE BLDC GLUCOMTR-MCNC: 255 MG/DL — HIGH (ref 70–99)
GLUCOSE BLDC GLUCOMTR-MCNC: 259 MG/DL — HIGH (ref 70–99)
GLUCOSE BLDC GLUCOMTR-MCNC: 260 MG/DL — HIGH (ref 70–99)
GLUCOSE BLDC GLUCOMTR-MCNC: 285 MG/DL — HIGH (ref 70–99)
GLUCOSE BLDC GLUCOMTR-MCNC: 316 MG/DL — HIGH (ref 70–99)
GLUCOSE SERPL-MCNC: 242 MG/DL — HIGH (ref 70–99)
HCT VFR BLD CALC: 26.2 % — LOW (ref 39–50)
HCT VFR BLD CALC: 27.2 % — LOW (ref 39–50)
HGB BLD-MCNC: 8.2 G/DL — LOW (ref 13–17)
HGB BLD-MCNC: 8.5 G/DL — LOW (ref 13–17)
INR BLD: 1.51 — HIGH (ref 0.88–1.16)
LYMPHOCYTES # BLD AUTO: 2.5 % — LOW (ref 13–44)
MAGNESIUM SERPL-MCNC: 1.9 MG/DL — SIGNIFICANT CHANGE UP (ref 1.6–2.6)
MCHC RBC-ENTMCNC: 28.2 PG — SIGNIFICANT CHANGE UP (ref 27–34)
MCHC RBC-ENTMCNC: 29.2 PG — SIGNIFICANT CHANGE UP (ref 27–34)
MCHC RBC-ENTMCNC: 31.3 G/DL — LOW (ref 32–36)
MCHC RBC-ENTMCNC: 31.3 G/DL — LOW (ref 32–36)
MCV RBC AUTO: 90 FL — SIGNIFICANT CHANGE UP (ref 80–100)
MCV RBC AUTO: 93.5 FL — SIGNIFICANT CHANGE UP (ref 80–100)
METHOD TYPE: SIGNIFICANT CHANGE UP
METHOD TYPE: SIGNIFICANT CHANGE UP
MONOCYTES NFR BLD AUTO: 3.4 % — SIGNIFICANT CHANGE UP (ref 2–14)
NEUTROPHILS NFR BLD AUTO: 94.1 % — HIGH (ref 43–77)
ORGANISM # SPEC MICROSCOPIC CNT: SIGNIFICANT CHANGE UP
PHOSPHATE SERPL-MCNC: 6.6 MG/DL — HIGH (ref 2.5–4.5)
PLATELET # BLD AUTO: 170 K/UL — SIGNIFICANT CHANGE UP (ref 150–400)
PLATELET # BLD AUTO: 204 K/UL — SIGNIFICANT CHANGE UP (ref 150–400)
POTASSIUM SERPL-MCNC: 4.1 MMOL/L — SIGNIFICANT CHANGE UP (ref 3.5–5.3)
POTASSIUM SERPL-SCNC: 4.1 MMOL/L — SIGNIFICANT CHANGE UP (ref 3.5–5.3)
PROTHROM AB SERPL-ACNC: 16.9 SEC — HIGH (ref 9.8–12.7)
RBC # BLD: 2.91 M/UL — LOW (ref 4.2–5.8)
RBC # BLD: 2.91 M/UL — LOW (ref 4.2–5.8)
RBC # FLD: 17.2 % — HIGH (ref 10.3–16.9)
RBC # FLD: 17.6 % — HIGH (ref 10.3–16.9)
SODIUM SERPL-SCNC: 133 MMOL/L — LOW (ref 135–145)
SPECIMEN SOURCE: SIGNIFICANT CHANGE UP
WBC # BLD: 19.3 K/UL — HIGH (ref 3.8–10.5)
WBC # BLD: 20.2 K/UL — HIGH (ref 3.8–10.5)
WBC # FLD AUTO: 19.3 K/UL — HIGH (ref 3.8–10.5)
WBC # FLD AUTO: 20.2 K/UL — HIGH (ref 3.8–10.5)

## 2018-05-01 PROCEDURE — 99233 SBSQ HOSP IP/OBS HIGH 50: CPT

## 2018-05-01 PROCEDURE — 99232 SBSQ HOSP IP/OBS MODERATE 35: CPT | Mod: GC

## 2018-05-01 PROCEDURE — 71045 X-RAY EXAM CHEST 1 VIEW: CPT | Mod: 26

## 2018-05-01 RX ORDER — SODIUM CHLORIDE 9 MG/ML
250 INJECTION INTRAMUSCULAR; INTRAVENOUS; SUBCUTANEOUS ONCE
Qty: 0 | Refills: 0 | Status: COMPLETED | OUTPATIENT
Start: 2018-05-01 | End: 2018-05-01

## 2018-05-01 RX ORDER — INSULIN HUMAN 100 [IU]/ML
INJECTION, SOLUTION SUBCUTANEOUS EVERY 6 HOURS
Qty: 0 | Refills: 0 | Status: DISCONTINUED | OUTPATIENT
Start: 2018-05-01 | End: 2018-06-04

## 2018-05-01 RX ORDER — ERYTHROPOIETIN 10000 [IU]/ML
8000 INJECTION, SOLUTION INTRAVENOUS; SUBCUTANEOUS ONCE
Qty: 0 | Refills: 0 | Status: COMPLETED | OUTPATIENT
Start: 2018-05-01 | End: 2018-05-01

## 2018-05-01 RX ORDER — DOXERCALCIFEROL 2.5 UG/1
2 CAPSULE ORAL ONCE
Qty: 0 | Refills: 0 | Status: COMPLETED | OUTPATIENT
Start: 2018-05-01 | End: 2018-05-01

## 2018-05-01 RX ORDER — WARFARIN SODIUM 2.5 MG/1
5 TABLET ORAL ONCE
Qty: 0 | Refills: 0 | Status: COMPLETED | OUTPATIENT
Start: 2018-05-01 | End: 2018-05-01

## 2018-05-01 RX ORDER — INSULIN LISPRO 100/ML
VIAL (ML) SUBCUTANEOUS EVERY 6 HOURS
Qty: 0 | Refills: 0 | Status: DISCONTINUED | OUTPATIENT
Start: 2018-05-01 | End: 2018-05-01

## 2018-05-01 RX ORDER — SODIUM CHLORIDE 9 MG/ML
1000 INJECTION INTRAMUSCULAR; INTRAVENOUS; SUBCUTANEOUS
Qty: 0 | Refills: 0 | Status: DISCONTINUED | OUTPATIENT
Start: 2018-05-01 | End: 2018-05-01

## 2018-05-01 RX ORDER — INSULIN GLARGINE 100 [IU]/ML
20 INJECTION, SOLUTION SUBCUTANEOUS ONCE
Qty: 0 | Refills: 0 | Status: COMPLETED | OUTPATIENT
Start: 2018-05-01 | End: 2018-05-01

## 2018-05-01 RX ORDER — INSULIN HUMAN 100 [IU]/ML
5 INJECTION, SOLUTION SUBCUTANEOUS EVERY 6 HOURS
Qty: 0 | Refills: 0 | Status: DISCONTINUED | OUTPATIENT
Start: 2018-05-01 | End: 2018-05-02

## 2018-05-01 RX ORDER — INSULIN GLARGINE 100 [IU]/ML
20 INJECTION, SOLUTION SUBCUTANEOUS AT BEDTIME
Qty: 0 | Refills: 0 | Status: DISCONTINUED | OUTPATIENT
Start: 2018-05-01 | End: 2018-05-04

## 2018-05-01 RX ORDER — INSULIN LISPRO 100/ML
5 VIAL (ML) SUBCUTANEOUS EVERY 6 HOURS
Qty: 0 | Refills: 0 | Status: DISCONTINUED | OUTPATIENT
Start: 2018-05-01 | End: 2018-05-01

## 2018-05-01 RX ADMIN — PIPERACILLIN AND TAZOBACTAM 200 GRAM(S): 4; .5 INJECTION, POWDER, LYOPHILIZED, FOR SOLUTION INTRAVENOUS at 15:10

## 2018-05-01 RX ADMIN — Medication 50 MILLIGRAM(S): at 06:01

## 2018-05-01 RX ADMIN — Medication 6: at 06:48

## 2018-05-01 RX ADMIN — Medication 100 MILLIGRAM(S): at 11:39

## 2018-05-01 RX ADMIN — INSULIN HUMAN 6: 100 INJECTION, SOLUTION SUBCUTANEOUS at 17:42

## 2018-05-01 RX ADMIN — ATORVASTATIN CALCIUM 80 MILLIGRAM(S): 80 TABLET, FILM COATED ORAL at 21:48

## 2018-05-01 RX ADMIN — Medication 5 MILLIGRAM(S): at 17:41

## 2018-05-01 RX ADMIN — Medication 1 APPLICATION(S): at 16:58

## 2018-05-01 RX ADMIN — Medication 50 MILLIGRAM(S): at 15:09

## 2018-05-01 RX ADMIN — Medication 5 UNIT(S): at 06:48

## 2018-05-01 RX ADMIN — MIDODRINE HYDROCHLORIDE 15 MILLIGRAM(S): 2.5 TABLET ORAL at 15:10

## 2018-05-01 RX ADMIN — Medication 1 TABLET(S): at 11:39

## 2018-05-01 RX ADMIN — Medication 5 MILLIGRAM(S): at 23:38

## 2018-05-01 RX ADMIN — INSULIN HUMAN 5 UNIT(S): 100 INJECTION, SOLUTION SUBCUTANEOUS at 11:56

## 2018-05-01 RX ADMIN — ESCITALOPRAM OXALATE 5 MILLIGRAM(S): 10 TABLET, FILM COATED ORAL at 11:38

## 2018-05-01 RX ADMIN — MODAFINIL 100 MILLIGRAM(S): 200 TABLET ORAL at 06:02

## 2018-05-01 RX ADMIN — MIDODRINE HYDROCHLORIDE 15 MILLIGRAM(S): 2.5 TABLET ORAL at 06:02

## 2018-05-01 RX ADMIN — PANTOPRAZOLE SODIUM 40 MILLIGRAM(S): 20 TABLET, DELAYED RELEASE ORAL at 11:38

## 2018-05-01 RX ADMIN — Medication 1 MILLIGRAM(S): at 11:39

## 2018-05-01 RX ADMIN — Medication 5 MILLIGRAM(S): at 00:26

## 2018-05-01 RX ADMIN — DOXERCALCIFEROL 2 MICROGRAM(S): 2.5 CAPSULE ORAL at 10:17

## 2018-05-01 RX ADMIN — INSULIN GLARGINE 20 UNIT(S): 100 INJECTION, SOLUTION SUBCUTANEOUS at 22:06

## 2018-05-01 RX ADMIN — INSULIN HUMAN 6: 100 INJECTION, SOLUTION SUBCUTANEOUS at 23:34

## 2018-05-01 RX ADMIN — PIPERACILLIN AND TAZOBACTAM 200 GRAM(S): 4; .5 INJECTION, POWDER, LYOPHILIZED, FOR SOLUTION INTRAVENOUS at 21:52

## 2018-05-01 RX ADMIN — CHLORHEXIDINE GLUCONATE 15 MILLILITER(S): 213 SOLUTION TOPICAL at 21:49

## 2018-05-01 RX ADMIN — MIDODRINE HYDROCHLORIDE 15 MILLIGRAM(S): 2.5 TABLET ORAL at 22:05

## 2018-05-01 RX ADMIN — ERGOCALCIFEROL 6000 UNIT(S): 1.25 CAPSULE ORAL at 11:38

## 2018-05-01 RX ADMIN — INSULIN HUMAN 4: 100 INJECTION, SOLUTION SUBCUTANEOUS at 12:00

## 2018-05-01 RX ADMIN — MODAFINIL 100 MILLIGRAM(S): 200 TABLET ORAL at 15:09

## 2018-05-01 RX ADMIN — SODIUM CHLORIDE 1000 MILLILITER(S): 9 INJECTION INTRAMUSCULAR; INTRAVENOUS; SUBCUTANEOUS at 15:37

## 2018-05-01 RX ADMIN — Medication 5 MILLIGRAM(S): at 06:02

## 2018-05-01 RX ADMIN — CHLORHEXIDINE GLUCONATE 15 MILLILITER(S): 213 SOLUTION TOPICAL at 11:38

## 2018-05-01 RX ADMIN — Medication 81 MILLIGRAM(S): at 11:42

## 2018-05-01 RX ADMIN — LEVETIRACETAM 500 MILLIGRAM(S): 250 TABLET, FILM COATED ORAL at 11:38

## 2018-05-01 RX ADMIN — SODIUM CHLORIDE 1000 MILLILITER(S): 9 INJECTION INTRAMUSCULAR; INTRAVENOUS; SUBCUTANEOUS at 14:52

## 2018-05-01 RX ADMIN — INSULIN HUMAN 5 UNIT(S): 100 INJECTION, SOLUTION SUBCUTANEOUS at 23:34

## 2018-05-01 RX ADMIN — Medication 5 MILLIGRAM(S): at 11:42

## 2018-05-01 RX ADMIN — LEVETIRACETAM 250 MILLIGRAM(S): 250 TABLET, FILM COATED ORAL at 17:41

## 2018-05-01 RX ADMIN — INSULIN GLARGINE 20 UNIT(S): 100 INJECTION, SOLUTION SUBCUTANEOUS at 01:48

## 2018-05-01 RX ADMIN — PIPERACILLIN AND TAZOBACTAM 200 GRAM(S): 4; .5 INJECTION, POWDER, LYOPHILIZED, FOR SOLUTION INTRAVENOUS at 06:01

## 2018-05-01 RX ADMIN — HEPARIN SODIUM 14 UNIT(S)/HR: 5000 INJECTION INTRAVENOUS; SUBCUTANEOUS at 17:42

## 2018-05-01 RX ADMIN — ERYTHROPOIETIN 8000 UNIT(S): 10000 INJECTION, SOLUTION INTRAVENOUS; SUBCUTANEOUS at 11:53

## 2018-05-01 RX ADMIN — Medication 50 MILLIGRAM(S): at 21:49

## 2018-05-01 RX ADMIN — INSULIN HUMAN 5 UNIT(S): 100 INJECTION, SOLUTION SUBCUTANEOUS at 17:42

## 2018-05-01 RX ADMIN — WARFARIN SODIUM 5 MILLIGRAM(S): 2.5 TABLET ORAL at 21:51

## 2018-05-01 NOTE — PROGRESS NOTE ADULT - SUBJECTIVE AND OBJECTIVE BOX
Patient is a 63y Male seen and evaluated at bedside. Patient lying in bed in no acute distress remains on ventilatory support through tunnel HD catheter and off IV pressors at present. Patient with Last HD treatment on 4/28 with UF 1L. Chest xray with pulmonary vascular congestion and small effusion.       acetaminophen    Suspension. 650 every 6 hours PRN  acetaminophen  Suppository 650 every 6 hours PRN  aspirin  chewable 81 daily  atorvastatin 80 at bedtime  chlorhexidine 0.12% Liquid 15 two times a day  chlorhexidine 2% Cloths 1 daily  dextrose 5%. 1000 <Continuous>  dextrose 50% Injectable 12.5 once  dextrose 50% Injectable 25 once  dextrose 50% Injectable 25 once  dextrose Gel 1 once PRN  ergocalciferol Drops 6000 daily  escitalopram 5 daily  folic acid 1 daily  glucagon  Injectable 1 once PRN  heparin  Infusion 1400 <Continuous>  hydrocortisone sodium succinate Injectable 50 every 8 hours  insulin glargine Injectable (LANTUS) 20 at bedtime  insulin regular  human corrective regimen sliding scale  every 6 hours  insulin regular  human recombinant 5 every 6 hours  levETIRAcetam  Solution 250 <User Schedule>  levETIRAcetam  Solution 500 every 24 hours  metoclopramide 5 every 6 hours  midodrine 15 every 8 hours  modafinil 100 <User Schedule>  multivitamin 1 daily  pantoprazole   Suspension 40 daily  piperacillin/tazobactam IVPB. 2.25 every 8 hours  silver sulfADIAZINE 1% Cream 1 daily  thiamine 100 daily      Allergies    No Known Allergies    Intolerances        T(C): , Max: 36.9 (04-30-18 @ 17:00)  T(F): , Max: 98.4 (04-30-18 @ 17:00)  HR: 78 (05-01-18 @ 10:00)  BP: 88/63 (05-01-18 @ 10:00)  BP(mean): 75 (05-01-18 @ 10:00)  RR: 22 (05-01-18 @ 10:00)  SpO2: 100% (05-01-18 @ 10:00)  Wt(kg): --    04-30 @ 07:01  -  05-01 @ 07:00  --------------------------------------------------------  IN: 2556.5 mL / OUT: 0 mL / NET: 2556.5 mL          Review of Systems:  Limited. Patient remains confused and disoriented. Unable to follow verbal commands.      PHYSICAL EXAM:  GENERAL Ill appearing, lying in bed, confused and disoriented in no acute distress at present on ventilatory support   HEAD:  Atraumatic, Normocephalic,   EYES: Bilateral conjuctival and scleral pallor+nt  Oral cavity: Oral mucosa dry and pale  NECK: Neck supple, No JVP, Tracheostomy present.   CHEST/LUNG: Bilateral decreased breath sounds, Bibasilar rales and crepitatiosn+nt, no wheezing  HEART: Regular rate and rhythm. HOMER II/VI at LPSB, No gallop, no rub   ABDOMEN: Soft, Nontender, non distended, BS+nt, No flank tenderness.   EXTREMITIES: Bilateral thigh and dependent abdominal/thigh edema present No clubbing, cyanosis  Neurology: AAOx1, confused and disoriented, Able to move extremities.   SKIN: No rashes or lesions          ACCESS: Right chest wall tunnel HD catheter present.     LABS:                        8.2    20.2  )-----------( 204      ( 01 May 2018 06:14 )             26.2     05-01    133<L>  |  91<L>  |  57<H>  ----------------------------<  242<H>  4.1   |  20<L>  |  3.93<H>    Ca    8.8      01 May 2018 05:58  Phos  6.6     05-01  Mg     1.9     05-01        PT/INR - ( 01 May 2018 05:58 )   PT: 16.9 sec;   INR: 1.51          PTT - ( 01 May 2018 05:58 )  PTT:71.6 sec          RADIOLOGY & ADDITIONAL STUDIES:    < from: Xray Chest 1 View- PORTABLE-Routine (04.30.18 @ 06:14) >    EXAM:  XR CHEST PORTABLE ROUTINE 1V                          PROCEDURE DATE:  04/30/2018                     INTERPRETATION:  Portable chest    History: Aspiration pneumonia follow-up abnormal exam    The infiltrate/atelectasis in the right lung base is improving compared   to prior exam 4/29/2018. Right venous catheter tracheostomy tube and   left-sided implanted cardiac device, again noted.    Impression:    Improving right lung base infiltrate/focal atelectasis.            "Thank you for theopportunity to participate in the care of this   patient."        HESHAM BERRIOS M.D., ATTENDING RADIOLOGIST  This document has been electronically signed. Apr 30 2018 12:28PM                  < end of copied text >

## 2018-05-01 NOTE — PROGRESS NOTE ADULT - PROBLEM SELECTOR PLAN 10
VTE: Hep gtt, Coumadin  GI PPx: PPI    FULL CODE    F: No IVF in setting of HD.  E: Replete electrolytes with caution in setting of HD.   N:  Restarted on tube feeds with Vital 1.5 Terrell at 63cc/hr    Dispo: Initially admitted to MICU but now on 7LA for further management of hypotension in setting of shock, acute encephalopathy, bacteremia and acute on chronic respiratory failure. Course complicated by Septic shock 2/2 to aspiration PNA for which patient now off levophed. Transferred back to [facility] for further management of aspiraton PNA, chronic respiratory failure, hypotension and glucose control. Dispo Pending L-tACH with clinical stabilization.

## 2018-05-01 NOTE — PROGRESS NOTE ADULT - SUBJECTIVE AND OBJECTIVE BOX
Patient was seen and evaluated on dialysis.   Patient is tolerating the procedure well.   HR: 78 (05-01-18 @ 10:00)  BP: 88/63 (05-01-18 @ 10:00)  Continue dialysis:   Dialyzer: Revaclear 300         QB: 300        QD: 500  3K+  Goal UF 2 kg  over 3 Hours   EPO and hectoral during HD Patient was seen and evaluated on dialysis.   Patient is tolerating the procedure.   HR: 78 (05-01-18 @ 10:00)  BP: 88/63 (05-01-18 @ 10:00)  Continue dialysis:   Dialyzer: Revaclear 300         QB: 300        QD: 500  3K+  Goal UF 2 kg  over 3 Hours   EPO and hectoral during HD

## 2018-05-01 NOTE — PROGRESS NOTE ADULT - ASSESSMENT
Assessment/Plan:  63M PMH HFrEF 10-15% (ischemic), MI, s/p AICD vs PPM, possible Afib, HTN, DM2 on insulin, possible CKD, and gout, who presented with a chief complaint of generalized weakness s/p septic shock likely 2/2 LE cellulitis complicated by ATN requiring dialysis. Course also c/b AMS likely from brainstem infarct 2/2 LV thrombus and/or toxic metabolic encephalopathy and found to have candidemia and sepsis 2/2 klebsiella bacteremia s/p fluconazole and CTX and de-lining. Transitioned to PO meds through PEG for end-goal of L-TACH at new baseline mental status. Pt continuing on HD with new permacath placed 4/25 and a/c with hep bridge to coumadin for afib and LV thrombus. Currently ventilator dependent tolerating daily CPAP trials and with resolving septic shock from aspiration pneumonia.     NEURO  #Toxic Metabolic Encephalopathy- Acute change in mental status likely 2/2 brainstem infarct with component of encephalopathy from infection. 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 Per neurology may have had ischemic event from hypoperfusion. Also showing disc protrusion at C3/C4 level coursing superiorly to the C2/C3 contacting the ventral spinal cord. Per neurology, this may be contributing to weakness, however would not explain change in mental status.    - d/w neurology need for Modafinil   - goal for placement at L-TAC when stabilized  - MRI head repeat for prognostication of CVA- ordered    #Seizures- c/w keppra 500 mg qd with extra 250 mg post HD days    ID  #Pneumonia- suspect aspiration pna from aspiration of feeds given that patient seen coughing up feeds/secretion through trach with residual feeds in stomach. CXR showing new RLL infiltrate. D/c'd vanc as no evidence of staph on sputum gram stain  - c/w Zosyn 2.25 q8hrs (4/27-)  - Bcxs NGTD  - sputum cxs  growing Kleb sensitive to zosyn    #Klebsiella Bacteremia- Resolved. S/p Ceftriaxone 2g q 24 hrs (4/15-4/26)  #Candidemia with candida tropicalis- Resolved. S/p completion of Fluconazole 200mg q 24hrs (4/13-4/24)    CARDIOVASCULAR   # Septic Shock requiring Levophed- Improving, levophed requirements decreasing. Now off pressors.  - c/w Midodrine 15 mg q8h  - tapering to Solucortef 50mg q8hrs stress dose steroids    # CHF with EF 10-15% 2/2 ischemic cardiomyopathy s/p AICD. Elevated BNP on admission with R sided effusion and edema. Patient with persistent pleural effusions on CXR and US and b/l dependent edema.   - Maintain negative fluid balance with dialysis  - c/w holding ACE/BB in setting of sepsis/ hypotension    #AFIB- hx of Afib and LV thrombus on coumadin prior to admission; TTE with definity shows no LV thrombus currently   - c/w Heparin gtt  - c/w holding Metoprolol 12.5 q12h given hypotension. Will use Dig for rate control if RVR  - start bridging to coumadin    #LV thrombus: RUKHSANA on 4/10 showing obvious thrombus in LV  - c/w hep gtt for AC with bridging to coumadin    #CAD- Hx of MI and ischemic CM s/p AICD, STEMI 7/2017.   - c/w aspirin 81 and Atorvastatin 80mg qhs    PULMONARY   #Chronic Resp failure- S/p tracheostomy 4/5. Bedside US with simple b/l pleural effusions and atelectatic lung. Less concern for infectious source given b/l effusions with no septations/loculations. Likely related to fluid overload. If clinically worsening, can consider thoracentesis for fluid studies and cultures.  - c/w daily CPAP trials, weaning to trach collar as tolerated  - c/w HD to remove excess fluid    RENAL   #Chronic renal failure- likely ischemic ATN in setting of sepsis with low intravascular volume. Unknown baseline Cr presenting with Cr 3.93 with metabolic derangements. Renal team on board, now on intermittent HD. HD catheter removed 4/8 due to fungemia, now with L subclavian HD cath.   - Next HD session likely Tuesday 5/1, c/w intermittent HD per renal recs  - patient has NOT required albumin on HD    #Adrenal Insufficiency- previously on fludrocortisone and prednisone. Started on stress dose steroids in setting of acute infection.  - tapering steroids as now off pressors to 50q8    #Elevated AG- Fluctuating at low level; lactate negative, glucose has been well controlled, possibly 2/2 uremia in setting of ESRD.   - Monitor BMP    HEME  #Anemia: likely ACD given ESRD on HD; b/l Hb inpatient has been 7-8; no active S&S bleeding  -Hb today 7  -transfuse 1 units  -trend CBC  -active type screen  -goal Hb>7    GI   #PEG in place- holding tube feeds in setting of new aspiration PNA and abdominal distension.  - c/w Reglan 5mg q6hrs   - c/w tube feeds    ENDOCRINE   #Diabetes- HbA1C 8.6.  - c/w regular insulin 7units q6h  - c/w lantus 10u qhs  - MISS, monitor FSG and adjust as needed     MSK  #LUE Swelling- LUE US showing soft tissue swelling with no fluid collection. Ortho rec no intervention, supportive care.  -amended LUE US read showing cannot r/o evolving abscess although not visible fluid collection  -will f/u ortho hand recs     F: none  E: Replete K<4 Mg<2, caution in setting of acute renal failure  N: PEG Placed 4/4, holding tube feeds for now   DVT ppx: on heparin gtt brdiging to coumadin  GI ppx: PPI 40 daily  Lines: R permacath (4/25), L IJ (4/28)  Drips: none  Full code  Dispo: MICU Assessment/Plan:  63M PMH HFrEF 10-15% (ischemic), MI, s/p AICD vs PPM, possible Afib, HTN, DM2 on insulin, possible CKD, and gout, who presented with a chief complaint of generalized weakness s/p septic shock likely 2/2 LE cellulitis complicated by ATN requiring dialysis. Course also c/b AMS likely from brainstem infarct 2/2 LV thrombus and/or toxic metabolic encephalopathy and found to have candidemia and sepsis 2/2 klebsiella bacteremia s/p fluconazole and CTX and de-lining. Transitioned to PO meds through PEG for end-goal of L-TACH at new baseline mental status. Pt continuing on HD with new permacath placed 4/25 and a/c with hep bridge to coumadin for afib and LV thrombus. Currently ventilator dependent tolerating daily CPAP trials and with resolving septic shock from aspiration pneumonia.     NEURO  #Toxic Metabolic Encephalopathy- Acute change in mental status likely 2/2 brainstem infarct with component of encephalopathy from infection. 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 Per neurology may have had ischemic event from hypoperfusion. Also showing disc protrusion at C3/C4 level coursing superiorly to the C2/C3 contacting the ventral spinal cord. Per neurology, this may be contributing to weakness, however would not explain change in mental status.    -c/w Modafinil for now  - goal for placement at L-TAC when stabilized  - MRI head repeat for prognostication of CVA- ordered    #Seizures- c/w keppra 500 mg qd with extra 250 mg post HD days    ID  #Pneumonia- suspect aspiration pna from aspiration of feeds given that patient seen coughing up feeds/secretion through trach with residual feeds in stomach. CXR showing new RLL infiltrate. D/c'd vanc as no evidence of staph on sputum gram stain, sputum cxs  growing Kleb sensitive to zosyn  - c/w Zosyn 2.25 q8hrs (4/27-)  - Bcxs NGTD    #Klebsiella Bacteremia- Resolved. S/p Ceftriaxone 2g q 24 hrs (4/15-4/26)  #Candidemia with candida tropicalis- Resolved. S/p completion of Fluconazole 200mg q 24hrs (4/13-4/24)    CARDIOVASCULAR   # Septic Shock requiring Levophed- Improving, levophed requirements decreasing. Now off pressors.  - c/w Midodrine 15 mg q8h  - c/w tapering Solucortef   - monitor MAPs    # CHF with EF 10-15% 2/2 ischemic cardiomyopathy s/p AICD. Elevated BNP on admission with R sided effusion and edema. Patient with persistent pleural effusions on CXR and US and b/l dependent edema.   - Maintain negative fluid balance with dialysis  - c/w holding ACE/BB in setting of sepsis/ hypotension    #AFIB- hx of Afib and LV thrombus on coumadin prior to admission; TTE with definity shows no LV thrombus currently   - c/w Heparin gtt  - c/w holding Metoprolol 12.5 q12h given hypotension. Will use Dig for rate control if RVR  - c/w bridging to coumadin    #LV thrombus: RUKHSANA on 4/10 showing obvious thrombus in LV  - c/w hep gtt for AC with bridging to coumadin    #CAD- Hx of MI and ischemic CM s/p AICD, STEMI 7/2017.   - c/w aspirin 81 and Atorvastatin 80mg qhs    PULMONARY   #Chronic Resp failure- S/p tracheostomy 4/5. Bedside US with simple b/l pleural effusions and atelectatic lung. Less concern for infectious source given b/l effusions with no septations/loculations. Likely related to fluid overload. If clinically worsening, can consider thoracentesis for fluid studies and cultures.  - c/w daily CPAP trials, weaning to trach collar as tolerated  - c/w HD to remove excess fluid    RENAL   #Chronic renal failure- likely ischemic ATN in setting of sepsis with low intravascular volume. Unknown baseline Cr presenting with Cr 3.93 with metabolic derangements. Renal team on board, now on intermittent HD. HD catheter removed 4/8 due to fungemia, now with L subclavian HD cath.   - Next HD session 5/1, c/w intermittent HD per renal recs  - patient has NOT required albumin on HD    #Adrenal Insufficiency- previously on fludrocortisone and prednisone. Started on stress dose steroids in setting of acute infection.  -c/w  tapering stress steroids     #Elevated AG- Fluctuating at low level; lactate negative, glucose has been well controlled, possibly 2/2 uremia in setting of ESRD.   - Monitor BMP    HEME  #Anemia: likely ACD given ESRD on HD; b/l Hb inpatient has been 7-8; no active S&S bleeding; s/p 1unit PRBC for Hb 7.0 with appropriate response  -trend CBC  -active type screen  -goal Hb>7    GI   #PEG in place- holding tube feeds in setting of new aspiration PNA and abdominal distension.  - c/w Reglan 5mg q6hrs   - c/w tube feeds    ENDOCRINE   #Diabetes- HbA1C 8.6.  - due to persistent FSG uptrending to >200s, pt started on insulin gtt 4/30 and titrated off by the evening  - c/w regular insulin 5units q6h  - c/w lantus 20u qhs  - mISS with regular insulin  - monitor FSG and adjust as needed     MSK  #LUE Swelling- LUE US showing soft tissue swelling with no fluid collection. Ortho rec no intervention, supportive care.  -amended LUE US read showing cannot r/o evolving abscess although not visible fluid collection  -ortho performed own US negative for fluid collection  -c/w supportive care  -wound care consulted     F: none  E: Replete K<4 Mg<2, caution in setting of acute renal failure  N: PEG Placed 4/4, holding tube feeds for now   DVT ppx: on heparin gtt bridging to coumadin  GI ppx: PPI 40 daily  Lines: R permacath (4/25), L IJ (4/28)  Drips: none  Full code  Dispo: MICU Assessment/Plan:  63M PMH HFrEF 10-15% (ischemic), MI, s/p AICD vs PPM, possible Afib, HTN, DM2 on insulin, possible CKD, and gout, who presented with a chief complaint of generalized weakness s/p septic shock likely 2/2 LE cellulitis complicated by ATN requiring dialysis. Course also c/b AMS likely from brainstem infarct 2/2 LV thrombus and/or toxic metabolic encephalopathy and found to have candidemia and sepsis 2/2 klebsiella bacteremia s/p fluconazole and CTX and de-lining. Transitioned to PO meds through PEG for end-goal of L-TACH at new baseline mental status. Pt continuing on HD with new permacath placed 4/25 and a/c with hep bridge to coumadin for afib and LV thrombus. Currently ventilator dependent tolerating daily CPAP trials and with resolving septic shock from aspiration pneumonia.     NEURO  #Toxic Metabolic Encephalopathy- Acute change in mental status likely 2/2 brainstem infarct with component of encephalopathy from infection. 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 Per neurology may have had ischemic event from hypoperfusion. Also showing disc protrusion at C3/C4 level coursing superiorly to the C2/C3 contacting the ventral spinal cord. Per neurology, this may be contributing to weakness, however would not explain change in mental status.    - c/w Modafinil for now  - goal for placement at L-TAC when stabilized  - MRI head repeat for prognostication of CVA- ordered    #Seizures- c/w keppra 500 mg qd with extra 250 mg post HD days    ID  #Pneumonia- suspect aspiration pna from aspiration of feeds given that patient seen coughing up feeds/secretion through trach with residual feeds in stomach. CXR showing new RLL infiltrate. D/c'd vanc as no evidence of staph on sputum gram stain, sputum cxs  growing Kleb sensitive to zosyn  - c/w Zosyn 2.25 q8hrs (4/27-)  - Bcxs NGTD    #Klebsiella Bacteremia- Resolved. S/p Ceftriaxone 2g q 24 hrs (4/15-4/26)  #Candidemia with candida tropicalis- Resolved. S/p completion of Fluconazole 200mg q 24hrs (4/13-4/24)    CARDIOVASCULAR   # Septic Shock requiring Levophed- Improving, levophed requirements decreasing. Now off pressors.  - c/w Midodrine 15 mg q8h  - c/w tapering Solucortef   - monitor MAPs    # CHF with EF 10-15% 2/2 ischemic cardiomyopathy s/p AICD. Elevated BNP on admission with R sided effusion and edema. Patient with persistent pleural effusions on CXR and US and b/l dependent edema.   - Maintain negative fluid balance with dialysis  - c/w holding ACE/BB in setting of sepsis/ hypotension    #AFIB- hx of Afib and LV thrombus on coumadin prior to admission; TTE with definity shows no LV thrombus currently   - c/w Heparin gtt  - c/w holding Metoprolol 12.5 q12h given hypotension. Will use Dig for rate control if RVR  - c/w bridging to coumadin    #LV thrombus: RUKHSANA on 4/10 showing obvious thrombus in LV  - c/w hep gtt for AC with bridging to coumadin    #CAD- Hx of MI and ischemic CM s/p AICD, STEMI 7/2017.   - c/w aspirin 81 and Atorvastatin 80mg qhs    PULMONARY   #Chronic Resp failure- S/p tracheostomy 4/5. Bedside US with simple b/l pleural effusions and atelectatic lung. Less concern for infectious source given b/l effusions with no septations/loculations. Likely related to fluid overload. If clinically worsening, can consider thoracentesis for fluid studies and cultures.  - c/w daily CPAP trials, weaning to trach collar as tolerated  - c/w HD to remove excess fluid    RENAL   #Chronic renal failure- likely ischemic ATN in setting of sepsis with low intravascular volume. Unknown baseline Cr presenting with Cr 3.93 with metabolic derangements. Renal team on board, now on intermittent HD. HD catheter removed 4/8 due to fungemia, now with L subclavian HD cath.   - Next HD session 5/1, c/w intermittent HD per renal recs  - patient has NOT required albumin on HD    #Adrenal Insufficiency- previously on fludrocortisone and prednisone. Started on stress dose steroids in setting of acute infection.  -c/w  tapering stress steroids     #Elevated AG- Fluctuating at low level; lactate negative, glucose has been well controlled, possibly 2/2 uremia in setting of ESRD.   - Monitor BMP    HEME  #Anemia: likely ACD given ESRD on HD; b/l Hb inpatient has been 7-8; no active S&S bleeding; s/p 1unit PRBC for Hb 7.0 with appropriate response  -trend CBC  -active type screen  -goal Hb>7    GI   #PEG in place- holding tube feeds in setting of new aspiration PNA and abdominal distension.  - c/w Reglan 5mg q6hrs   - c/w tube feeds    ENDOCRINE   #Diabetes- HbA1C 8.6.  - due to persistent FSG uptrending to >200s, pt started on insulin gtt 4/30 and titrated off by the evening  - c/w regular insulin 5units q6h  - c/w lantus 20u qhs  - mISS with regular insulin  - monitor FSG and adjust as needed     MSK  #LUE Swelling- LUE US showing soft tissue swelling with no fluid collection. Ortho rec no intervention, supportive care.  -amended LUE US read showing cannot r/o evolving abscess although not visible fluid collection  -ortho performed own US negative for fluid collection  -c/w supportive care  -wound care consulted     F: none  E: Replete K<4 Mg<2, caution in setting of acute renal failure  N: PEG Placed 4/4, holding tube feeds for now   DVT ppx: on heparin gtt bridging to coumadin  GI ppx: PPI 40 daily  Lines: R permacath (4/25), L IJ (4/27)  Drips: none  Full code  Dispo: MICU

## 2018-05-01 NOTE — CHART NOTE - NSCHARTNOTEFT_GEN_A_CORE
Admitting Diagnosis:   63M PMH HFrEF 10-15% (ischemic), MI, s/p AICD vs PPM, possible Afib, HTN, DM2 on insulin, possible CKD, and gout, who presents with a chief complaint of generalized weakness s/p septic shock likely 2/2 LE cellulitis. AMS and new leukocytisis likely 2/2 UTI. Trach and PEG dependent. Ready for SDU per MD.     PAST MEDICAL & SURGICAL HISTORY:  Type 2 diabetes mellitus with diabetic peripheral angiopathy without gangrene, with long-term curren  Essential hypertension, benign  Gout  Pacemaker  Chronic systolic heart failure  Myocardial infarction  No significant past surgical history      Current Nutrition Order:  Ordered for Vital 1.5 @ 63ml/hr x 24hr via PEG+ 2x prostat (1512ml TV, 2468kcal, 132g, 1155ml free H2O, 151%RDI)- running at goal     PO Intake: Good (%) [   ]  Fair (50-75%) [   ] Poor (<25%) [   ]- N/A TF dependent    GI Issues: No N/V noted by RN; metamucil stopped (d/t distention?) and constipation; BM 5/1, not loose    Pain: Unable to assess at this time 2/2 vent     Skin Integrity:   L buttock stage 2 PU  L calf venous ulcer  Trach stage 3 PU  L. UE eschar     Labs:       133<L>  |  91<L>  |  57<H>  ----------------------------<  242<H>  4.1   |  20<L>  |  3.93<H>    Ca    8.8      01 May 2018 05:58  Phos  6.6       Mg     1.9           CAPILLARY BLOOD GLUCOSE      POCT Blood Glucose.: 209 mg/dL (01 May 2018 11:50)  POCT Blood Glucose.: 285 mg/dL (01 May 2018 06:14)  POCT Blood Glucose.: 150 mg/dL (01 May 2018 01:04)  POCT Blood Glucose.: 167 mg/dL (01 May 2018 00:16)  POCT Blood Glucose.: 210 mg/dL (2018 23:13)  POCT Blood Glucose.: 270 mg/dL (2018 22:11)  POCT Blood Glucose.: 290 mg/dL (2018 21:08)  POCT Blood Glucose.: 295 mg/dL (2018 20:29)  POCT Blood Glucose.: 362 mg/dL (2018 19:14)  POCT Blood Glucose.: 342 mg/dL (2018 17:13)      Medications:  MEDICATIONS  (STANDING):  aspirin  chewable 81 milliGRAM(s) Oral daily  atorvastatin 80 milliGRAM(s) Oral at bedtime  chlorhexidine 0.12% Liquid 15 milliLiter(s) Swish and Spit two times a day  chlorhexidine 2% Cloths 1 Application(s) Topical daily  dextrose 5%. 1000 milliLiter(s) (50 mL/Hr) IV Continuous <Continuous>  dextrose 50% Injectable 12.5 Gram(s) IV Push once  dextrose 50% Injectable 25 Gram(s) IV Push once  dextrose 50% Injectable 25 Gram(s) IV Push once  ergocalciferol Drops 6000 Unit(s) Oral daily  escitalopram 5 milliGRAM(s) Oral daily  folic acid 1 milliGRAM(s) Oral daily  heparin  Infusion 1400 Unit(s)/Hr (14 mL/Hr) IV Continuous <Continuous>  hydrocortisone sodium succinate Injectable 50 milliGRAM(s) IV Push every 8 hours  insulin glargine Injectable (LANTUS) 20 Unit(s) SubCutaneous at bedtime  insulin regular  human corrective regimen sliding scale   SubCutaneous every 6 hours  insulin regular  human recombinant 5 Unit(s) SubCutaneous every 6 hours  levETIRAcetam  Solution 250 milliGRAM(s) Oral <User Schedule>  levETIRAcetam  Solution 500 milliGRAM(s) Oral every 24 hours  metoclopramide 5 milliGRAM(s) Oral every 6 hours  midodrine 15 milliGRAM(s) Oral every 8 hours  modafinil 100 milliGRAM(s) Oral <User Schedule>  multivitamin 1 Tablet(s) Oral daily  pantoprazole   Suspension 40 milliGRAM(s) Oral daily  piperacillin/tazobactam IVPB. 2.25 Gram(s) IV Intermittent every 8 hours  silver sulfADIAZINE 1% Cream 1 Application(s) Topical daily  sodium chloride 0.9% Bolus 250 milliLiter(s) IV Bolus once  thiamine 100 milliGRAM(s) Oral daily  warfarin 5 milliGRAM(s) Oral once    MEDICATIONS  (PRN):  acetaminophen    Suspension. 650 milliGRAM(s) Oral every 6 hours PRN Moderate Pain (4 - 6)  acetaminophen  Suppository 650 milliGRAM(s) Rectal every 6 hours PRN For Temp greater than 38 C (100.4 F)  dextrose Gel 1 Dose(s) Oral once PRN Blood Glucose LESS THAN 70 milliGRAM(s)/deciliter  glucagon  Injectable 1 milliGRAM(s) IntraMuscular once PRN Glucose LESS THAN 70 milligrams/deciliter      Weight:  Daily     Daily Weight in k.1 (01 May 2018 12:05)    Weight:  79.1kg ()  75.9kg ()  77.4kg ()  72.7kg ()  77.2kg ()  80.9 kg ()  84.5kg (3/31)  86.9kg (3/19)  94.7 kg (3/16)    Weight Change:   Weight fluctuating throughout admission d/t HD, TF inconsistencies     Estimated energy needs using 89 kg IBW; Needs estimated 2/2 vent/post-op/PU  Calories: 25-30 kcal/kg = 1943-5105 kcal/day  Protein: 1.4-1.6 g/kg = 125-142 g protein/day  Fluids: per team 2/2 HD     Subjective:   S/p trach and PEG placements on . S/p permacath replacement on . Tx to SDU, then stepped back up to MICU d/t asp pna and increased secretions. Pt now more stable. Seen in room, not alert, remains trached to vent, VC/AC mode. MAP 62. HD currently running. TF running at goal with better tolerance per RN, BM this morning. BG levels have been elevated since TF resumed on , though would advise against switching to Glucerna as patient has hx of not tolerating Glucerna and usually does well on Vital. BUN 57, Cr 3.93 (prior to HD), Phos 6.6 (H), Na 133 (L).    Previous Nutrition Diagnosis:   Increased protein-calorie needs RT increased demand for protein-calorie intake AEB on vent support    Active [X]  Resolved [   ]    New PES statement:     Goal:   Continue to meet % of nutrition needs via tolerated route.     Recommendations:  1. Continue Vital 1.5 via PEG @ 63mL/hr x 24hrs plus ProStat Sugar Free BID (200 kcal, 30g protein)  2. Continue to trend weights  3 Monitor POC BG- recommend adjusting insulin to control BG levels   4. Continue to monitor for GOC and keep nutrition in line at all times     Education:   N/A-vent    Risk Level: High [X] Moderate [   ] Low [   ]

## 2018-05-01 NOTE — PROGRESS NOTE ADULT - PROBLEM SELECTOR PLAN 2
Acute on chronic systolic CHF with LVEF 15 % and severely low blood pressure  Off IV pressors and inotropic support at present.  Optimize CHF treatment per primary/Cardiology team  Fluid restriction to <1L/day  Daily weight  Strict I/o

## 2018-05-01 NOTE — PROGRESS NOTE ADULT - SUBJECTIVE AND OBJECTIVE BOX
INTERVAL HPI/OVERNIGHT EVENTS: 7PM PTT 81.6. FSG improved. Stopped insulin gtt. Ordered 20U lantus, 5U lispro q6h, and mISS. AM PTT 71.6.    SUBJECTIVE: Patient seen and examined at bedside.     CONSTITUTIONAL: No weakness, fevers or chills  EYES/ENT: No visual changes;  No vertigo or throat pain   NECK: No pain or stiffness  RESPIRATORY: No cough, wheezing, hemoptysis; No shortness of breath  CARDIOVASCULAR: No chest pain or palpitations  GASTROINTESTINAL: No abdominal or epigastric pain. No nausea, vomiting, or hematemesis; No diarrhea or constipation. No melena or hematochezia.  GENITOURINARY: No dysuria, frequency or hematuria  NEUROLOGICAL: No numbness or weakness  SKIN: No itching, rashes    OBJECTIVE:    VITAL SIGNS:  ICU Vital Signs Last 24 Hrs  T(C): 36.8 (01 May 2018 06:00), Max: 36.9 (30 Apr 2018 17:00)  T(F): 98.3 (01 May 2018 06:00), Max: 98.4 (30 Apr 2018 17:00)  HR: 93 (01 May 2018 05:49) (68 - 98)  BP: 87/64 (01 May 2018 05:00) (70/49 - 93/64)  BP(mean): 73 (01 May 2018 05:00) (58 - 77)  ABP: --  ABP(mean): --  RR: 23 (01 May 2018 05:49) (13 - 123)  SpO2: 100% (01 May 2018 05:49) (100% - 100%)    Mode: AC/ CMV (Assist Control/ Continuous Mandatory Ventilation), RR (machine): 10, TV (machine): 500, FiO2: 40, PEEP: 5, ITime: 1, MAP: 14, PIP: 24    04-29 @ 07:01  -  04-30 @ 07:00  --------------------------------------------------------  IN: 1856 mL / OUT: 0 mL / NET: 1856 mL    04-30 @ 07:01  -  05-01 @ 06:24  --------------------------------------------------------  IN: 2556.5 mL / OUT: 0 mL / NET: 2556.5 mL      CAPILLARY BLOOD GLUCOSE      POCT Blood Glucose.: 285 mg/dL (01 May 2018 06:14)      PHYSICAL EXAM:    General: NAD  HEENT: NC/AT; PERRL, clear conjunctiva  Neck: supple  Respiratory: CTA b/l  Cardiovascular: +S1/S2; RRR  Abdomen: soft, NT/ND; +BS x4  Extremities: WWP, 2+ peripheral pulses b/l; no LE edema  Skin: normal color and turgor; no rash  Neurological:    MEDICATIONS:  MEDICATIONS  (STANDING):  aspirin  chewable 81 milliGRAM(s) Oral daily  atorvastatin 80 milliGRAM(s) Oral at bedtime  chlorhexidine 0.12% Liquid 15 milliLiter(s) Swish and Spit two times a day  chlorhexidine 2% Cloths 1 Application(s) Topical daily  dextrose 5%. 1000 milliLiter(s) (50 mL/Hr) IV Continuous <Continuous>  dextrose 50% Injectable 12.5 Gram(s) IV Push once  dextrose 50% Injectable 25 Gram(s) IV Push once  dextrose 50% Injectable 25 Gram(s) IV Push once  ergocalciferol Drops 6000 Unit(s) Oral daily  escitalopram 5 milliGRAM(s) Oral daily  folic acid 1 milliGRAM(s) Oral daily  heparin  Infusion 1400 Unit(s)/Hr (14 mL/Hr) IV Continuous <Continuous>  hydrocortisone sodium succinate Injectable 50 milliGRAM(s) IV Push every 8 hours  insulin glargine Injectable (LANTUS) 20 Unit(s) SubCutaneous at bedtime  insulin lispro (HumaLOG) corrective regimen sliding scale   SubCutaneous every 6 hours  insulin lispro Injectable (HumaLOG) 5 Unit(s) SubCutaneous every 6 hours  levETIRAcetam  Solution 250 milliGRAM(s) Oral <User Schedule>  levETIRAcetam  Solution 500 milliGRAM(s) Oral every 24 hours  metoclopramide 5 milliGRAM(s) Oral every 6 hours  midodrine 15 milliGRAM(s) Oral every 8 hours  modafinil 100 milliGRAM(s) Oral <User Schedule>  multivitamin 1 Tablet(s) Oral daily  pantoprazole   Suspension 40 milliGRAM(s) Oral daily  piperacillin/tazobactam IVPB. 2.25 Gram(s) IV Intermittent every 8 hours  silver sulfADIAZINE 1% Cream 1 Application(s) Topical daily  thiamine 100 milliGRAM(s) Oral daily    MEDICATIONS  (PRN):  acetaminophen    Suspension. 650 milliGRAM(s) Oral every 6 hours PRN Moderate Pain (4 - 6)  acetaminophen  Suppository 650 milliGRAM(s) Rectal every 6 hours PRN For Temp greater than 38 C (100.4 F)  dextrose Gel 1 Dose(s) Oral once PRN Blood Glucose LESS THAN 70 milliGRAM(s)/deciliter  glucagon  Injectable 1 milliGRAM(s) IntraMuscular once PRN Glucose LESS THAN 70 milligrams/deciliter      ALLERGIES:  Allergies    No Known Allergies    Intolerances        LABS:                        8.2    20.2  )-----------( 204      ( 01 May 2018 06:14 )             26.2     05-01    133<L>  |  91<L>  |  57<H>  ----------------------------<  242<H>  4.1   |  20<L>  |  3.93<H>    Ca    8.8      01 May 2018 05:58  Phos  6.6     05-01  Mg     1.9     05-01      PT/INR - ( 01 May 2018 05:58 )   PT: 16.9 sec;   INR: 1.51          PTT - ( 01 May 2018 05:58 )  PTT:71.6 sec      RADIOLOGY & ADDITIONAL TESTS: Reviewed. INTERVAL HPI/OVERNIGHT EVENTS: 7PM PTT 81.6. FSG improved. Stopped insulin gtt. Ordered 20U lantus, 5U lispro q6h, and mISS. AM PTT 71.6.    SUBJECTIVE: Patient seen and examined at bedside. NAD. No respiratory distress. Laying comfortably in bed. Responding to tactile stimuli. Not following commands. Unable to assess ROS.     OBJECTIVE:    VITAL SIGNS:  ICU Vital Signs Last 24 Hrs  T(C): 36.8 (01 May 2018 06:00), Max: 36.9 (30 Apr 2018 17:00)  T(F): 98.3 (01 May 2018 06:00), Max: 98.4 (30 Apr 2018 17:00)  HR: 93 (01 May 2018 05:49) (68 - 98)  BP: 87/64 (01 May 2018 05:00) (70/49 - 93/64)  BP(mean): 73 (01 May 2018 05:00) (58 - 77)  ABP: --  ABP(mean): --  RR: 23 (01 May 2018 05:49) (13 - 123)  SpO2: 100% (01 May 2018 05:49) (100% - 100%)    Mode: AC/ CMV (Assist Control/ Continuous Mandatory Ventilation), RR (machine): 10, TV (machine): 500, FiO2: 40, PEEP: 5, ITime: 1, MAP: 14, PIP: 24    04-29 @ 07:01  -  04-30 @ 07:00  --------------------------------------------------------  IN: 1856 mL / OUT: 0 mL / NET: 1856 mL    04-30 @ 07:01  -  05-01 @ 06:24  --------------------------------------------------------  IN: 2556.5 mL / OUT: 0 mL / NET: 2556.5 mL      CAPILLARY BLOOD GLUCOSE      POCT Blood Glucose.: 285 mg/dL (01 May 2018 06:14)      PHYSICAL EXAM:    General: NAD, no respiratory distress, laying comfortably in bed, responsive to tactile stimuli  HEENT: NC/AT; PERRL, clear conjunctiva  Neck: supple, trach in place, R permacath, L IJ  Respiratory: course breath sounds in upper lobes anteriorly, no wheezing or rales, no retractions or labored breathing, on AC/VC ventilation   Cardiovascular: +S1/S2; RRR, no M/R/G  Abdomen: soft, moderately distended, hypoactive BS, no TTP, no rebound or guarding,  PEG in place C/D/I  Extremities: WWP, 2+ pitting edema to level of thigh b/l, LUE with eschar on medial surface of forearm with surrounding tense edema, +fluctuance  Vasc: 2+ peripheral pulses b/l  Skin: dry, b/l LE with peeling skin, wrinkled/weeping skin of dorsal feet b/l  Neurological: not following commands, extremities rigid and stiff, no spontaneous movement of LUE, small movements of remaining extremities, RUE flexed, arouasable to tactile and painful stimuli     MEDICATIONS:  MEDICATIONS  (STANDING):  aspirin  chewable 81 milliGRAM(s) Oral daily  atorvastatin 80 milliGRAM(s) Oral at bedtime  chlorhexidine 0.12% Liquid 15 milliLiter(s) Swish and Spit two times a day  chlorhexidine 2% Cloths 1 Application(s) Topical daily  dextrose 5%. 1000 milliLiter(s) (50 mL/Hr) IV Continuous <Continuous>  dextrose 50% Injectable 12.5 Gram(s) IV Push once  dextrose 50% Injectable 25 Gram(s) IV Push once  dextrose 50% Injectable 25 Gram(s) IV Push once  ergocalciferol Drops 6000 Unit(s) Oral daily  escitalopram 5 milliGRAM(s) Oral daily  folic acid 1 milliGRAM(s) Oral daily  heparin  Infusion 1400 Unit(s)/Hr (14 mL/Hr) IV Continuous <Continuous>  hydrocortisone sodium succinate Injectable 50 milliGRAM(s) IV Push every 8 hours  insulin glargine Injectable (LANTUS) 20 Unit(s) SubCutaneous at bedtime  insulin lispro (HumaLOG) corrective regimen sliding scale   SubCutaneous every 6 hours  insulin lispro Injectable (HumaLOG) 5 Unit(s) SubCutaneous every 6 hours  levETIRAcetam  Solution 250 milliGRAM(s) Oral <User Schedule>  levETIRAcetam  Solution 500 milliGRAM(s) Oral every 24 hours  metoclopramide 5 milliGRAM(s) Oral every 6 hours  midodrine 15 milliGRAM(s) Oral every 8 hours  modafinil 100 milliGRAM(s) Oral <User Schedule>  multivitamin 1 Tablet(s) Oral daily  pantoprazole   Suspension 40 milliGRAM(s) Oral daily  piperacillin/tazobactam IVPB. 2.25 Gram(s) IV Intermittent every 8 hours  silver sulfADIAZINE 1% Cream 1 Application(s) Topical daily  thiamine 100 milliGRAM(s) Oral daily    MEDICATIONS  (PRN):  acetaminophen    Suspension. 650 milliGRAM(s) Oral every 6 hours PRN Moderate Pain (4 - 6)  acetaminophen  Suppository 650 milliGRAM(s) Rectal every 6 hours PRN For Temp greater than 38 C (100.4 F)  dextrose Gel 1 Dose(s) Oral once PRN Blood Glucose LESS THAN 70 milliGRAM(s)/deciliter  glucagon  Injectable 1 milliGRAM(s) IntraMuscular once PRN Glucose LESS THAN 70 milligrams/deciliter      ALLERGIES:  Allergies    No Known Allergies    Intolerances        LABS:                        8.2    20.2  )-----------( 204      ( 01 May 2018 06:14 )             26.2     05-01    133<L>  |  91<L>  |  57<H>  ----------------------------<  242<H>  4.1   |  20<L>  |  3.93<H>    Ca    8.8      01 May 2018 05:58  Phos  6.6     05-01  Mg     1.9     05-01      PT/INR - ( 01 May 2018 05:58 )   PT: 16.9 sec;   INR: 1.51          PTT - ( 01 May 2018 05:58 )  PTT:71.6 sec      RADIOLOGY & ADDITIONAL TESTS: Reviewed. MICU STEPDOWN [facility]  PGY-1 TRANSFER NOTE    HOSPITAL COURSE:  63M PMH HFrEF 10-15% (ischemic), CAD (STEMI per [facility] records 7/17), s/p AICD, Afib, HTN, DM2 on insulin, CKD, and gout admitted for septic shock 2/2 LE cellulitis as well as concern for ATN in setting of shock. Pt was started on levophed for hypotension. Dobutamine given low mix venous saturation with concern for component of cardiogenic shock however had to be discontinued given tachycardia.  Pt also experienced tachycardia on Milrinone. Pt was started on Vanco/ Zosyn and completed total 11 days of abx. PT was started on solucortef 50 q6h given recent hx of steroid use. Gallium scan demosntrated LLE  cellulitis. TTE with no vegetations. Given worsening GILMER and low UOP, renal was consulted. HD cath was placed and pt was started on CVVD and eventually transitioned to intermittent HD. Pt was noted have b/l pulmonary effusion and required R chest tube placement with fluid studies showing exudative process. Given extensive cardiac hx and component of cardiogenic shock, cardiology was consulted. Pt was started on Vasopressin to improve blood pressure. Pt also stated on Isordil and Hydralazine but pt was not  able to tolerate the medications. Pt was started  on Hep gtt for Afib but switched to Argatroban given possibility of HIT. HIT was ruled out and patient was bridged to coumadin. Pt was stable for transfer to CCU for further management of cardiogenic shock however on 3/22, pt became unresponsive post A line placement. Vital signs were normal during event. Stroke code was called and he was intubated for airway protection. CT was negative MRI after 4 days noted to have chronic infarcts with possible small brainstem stroke per neurology read. He was started on Modafinil however mental status did not improve significantly. VEEG significant for slowing but no seizures. After GOC discussions with family patient had trach and PEG on 4/4 with plans to be discharged to [facility] when stable. On 4/8 patient was found to be fungemic with candida tropicalis and started on micafungin, then switched to fluconazole on 4/13 when sensitivities resulted. RUKHSANA was negative for vegetations or infection of AICD. CT A/P performed given transamnitis/ elevated bili but source of candidemia remains unclear. HD Permacath was removed 4/8 and was replaced on 4/12 when surveillance cultures had been negative for 48 hours. However overnight on 4/12 patient spiked fever again and blood, urine and sputum cultures returned positive for Klebsiella. He was started on meropenem for 1 day, then switched to Zosyn for 2 days and finally placed on ceftriaxone when sensitivities returned on 4/15. Patient restarted on hydrocortisone in attempt to wean off pressors. Thrombocytopenia resolved and patient restarted on heparin gtt for A-fib. Given continued low-grade fevers and uptrending white count, permacath removed on 4/19 and replaced with temp HD cath. The following weekend, noticed pt with seizure-like activity and VEEG placed. Per neuro, started on keppra 500 mg qd with additional 250cc keppra on post-HD days. End goal of L-tachy placement.  Transitioned to PO prednisone and fludrocortisone to replace hydrocortisone. L-tachy will not accept albumin pushes, and goal to wean patient off albumin during HD for hypotension. In addition, pt to have repeat MRI for prognostication of CVA. Pt also noticed to have L forearm eschar that has enlarged and increasing fluctuance. LUE US ordered also showing R superficial thrombus; reordered soft tissue US to eval for abscess in L forearm. Ortho for hand consulted for possible I&D- ultrasound demonstrated no drainage fluid- soft tissue for which recommending nothing to do. Albumin was weaned off during dialysis session. Warfarin was dosed and bridged with hep gtt. O/n patient noted to become more agitated and pulling at the trach for which patient given seroquel and wrist restraints. Also noted to have SBP in 80s for which patient given 500cc bolus. On AM labsnoted to have Hgb of 6.7 from 7.4- no bloody output noted. Given 1UPRBC. Of noted, some abdominal distension. After rounds, patient noted to be coughing up feeds/secretions- 100cc residuals noted. Aggressively suctioned. Patient started to become tachycardiac to HR 120s. CXR performed demonstrating new R lower lobe. During work up patient noted to have vent disconnected and concern for secretions and loose trach. Pulm fellow called to bedside and ICU seniors to bedside. ABG drawn and determined to warrant further HD/resp monitoring in MICU on 4/27.    In MICU, pt found to be hypotensive with MAPs in 50s requiring pressor support with levophed. L IJ placed for access. Started on zosyn given clinical picture concerning of aspiration of feeds. He was also started on stress dose steroids in setting of septic shock 2/2 aspiration PNA with underlying adrenal insufficiency. Weaned off pressors over the night and maintained on stress steroids and midodrine. MAPs maintained in 60s-70s with baseline SBPs. Pt maintained on ventilatory support with daily CPAP and trach collar trials as tolerated. Repeat CXR and resp exam with improvement in infiltrate. HD was continued per renal without requirement of albumin with end goal of L-TACH. Started on reglan for motility. Resumed feeds at low rate with pt tolerating well. Pt currently being tapered off stress steroids with goal of resuming previous regimen of prednisone 6 mg and fludrocortisone 0.1 mg daily if BP allows. Given his h/o afib and LV thrombus, he was resumed back on hep gtt bridging to coumadin. Course c/b hyperglycemia to 280s in setting of resumed on feeds and stress dose steroids. Was placed on insulin gtt 4/30 and titrated off, dosed for lantus 20units and premeal 5units TID. Actively adjusting insulin regimen daily. Pt otherwise has remained HDS and optimized for stepdown to [facility] for further management.     INTERVAL HPI/OVERNIGHT EVENTS: 7PM PTT 81.6. FSG improved. Stopped insulin gtt. Ordered 20U lantus, 5U lispro q6h, and mISS. AM PTT 71.6.    SUBJECTIVE: Patient seen and examined at bedside. NAD. No respiratory distress. Laying comfortably in bed. Responding to tactile stimuli. Not following commands. Unable to assess ROS.     OBJECTIVE:    VITAL SIGNS:  ICU Vital Signs Last 24 Hrs  T(C): 36.8 (01 May 2018 06:00), Max: 36.9 (30 Apr 2018 17:00)  T(F): 98.3 (01 May 2018 06:00), Max: 98.4 (30 Apr 2018 17:00)  HR: 93 (01 May 2018 05:49) (68 - 98)  BP: 87/64 (01 May 2018 05:00) (70/49 - 93/64)  BP(mean): 73 (01 May 2018 05:00) (58 - 77)  ABP: --  ABP(mean): --  RR: 23 (01 May 2018 05:49) (13 - 123)  SpO2: 100% (01 May 2018 05:49) (100% - 100%)    Mode: AC/ CMV (Assist Control/ Continuous Mandatory Ventilation), RR (machine): 10, TV (machine): 500, FiO2: 40, PEEP: 5, ITime: 1, MAP: 14, PIP: 24    04-29 @ 07:01  -  04-30 @ 07:00  --------------------------------------------------------  IN: 1856 mL / OUT: 0 mL / NET: 1856 mL    04-30 @ 07:01  -  05-01 @ 06:24  --------------------------------------------------------  IN: 2556.5 mL / OUT: 0 mL / NET: 2556.5 mL      CAPILLARY BLOOD GLUCOSE      POCT Blood Glucose.: 285 mg/dL (01 May 2018 06:14)      PHYSICAL EXAM:    General: NAD, no respiratory distress, laying comfortably in bed, responsive to tactile stimuli  HEENT: NC/AT; PERRL, clear conjunctiva  Neck: supple, trach in place, R permacath, L IJ  Respiratory: course breath sounds in upper lobes anteriorly, no wheezing or rales, no retractions or labored breathing, on AC/VC ventilation   Cardiovascular: +S1/S2; RRR, no M/R/G  Abdomen: soft, moderately distended, hypoactive BS, no TTP, no rebound or guarding,  PEG in place C/D/I  Extremities: WWP, 2+ pitting edema to level of thigh b/l, LUE with eschar on medial surface of forearm with surrounding tense edema, +fluctuance  Vasc: 2+ peripheral pulses b/l  Skin: dry, b/l LE with peeling skin, wrinkled/weeping skin of dorsal feet b/l  Neurological: not following commands, extremities rigid and stiff, no spontaneous movement of LUE, small movements of remaining extremities, RUE flexed, arouasable to tactile and painful stimuli     MEDICATIONS:  MEDICATIONS  (STANDING):  aspirin  chewable 81 milliGRAM(s) Oral daily  atorvastatin 80 milliGRAM(s) Oral at bedtime  chlorhexidine 0.12% Liquid 15 milliLiter(s) Swish and Spit two times a day  chlorhexidine 2% Cloths 1 Application(s) Topical daily  dextrose 5%. 1000 milliLiter(s) (50 mL/Hr) IV Continuous <Continuous>  dextrose 50% Injectable 12.5 Gram(s) IV Push once  dextrose 50% Injectable 25 Gram(s) IV Push once  dextrose 50% Injectable 25 Gram(s) IV Push once  ergocalciferol Drops 6000 Unit(s) Oral daily  escitalopram 5 milliGRAM(s) Oral daily  folic acid 1 milliGRAM(s) Oral daily  heparin  Infusion 1400 Unit(s)/Hr (14 mL/Hr) IV Continuous <Continuous>  hydrocortisone sodium succinate Injectable 50 milliGRAM(s) IV Push every 8 hours  insulin glargine Injectable (LANTUS) 20 Unit(s) SubCutaneous at bedtime  insulin lispro (HumaLOG) corrective regimen sliding scale   SubCutaneous every 6 hours  insulin lispro Injectable (HumaLOG) 5 Unit(s) SubCutaneous every 6 hours  levETIRAcetam  Solution 250 milliGRAM(s) Oral <User Schedule>  levETIRAcetam  Solution 500 milliGRAM(s) Oral every 24 hours  metoclopramide 5 milliGRAM(s) Oral every 6 hours  midodrine 15 milliGRAM(s) Oral every 8 hours  modafinil 100 milliGRAM(s) Oral <User Schedule>  multivitamin 1 Tablet(s) Oral daily  pantoprazole   Suspension 40 milliGRAM(s) Oral daily  piperacillin/tazobactam IVPB. 2.25 Gram(s) IV Intermittent every 8 hours  silver sulfADIAZINE 1% Cream 1 Application(s) Topical daily  thiamine 100 milliGRAM(s) Oral daily    MEDICATIONS  (PRN):  acetaminophen    Suspension. 650 milliGRAM(s) Oral every 6 hours PRN Moderate Pain (4 - 6)  acetaminophen  Suppository 650 milliGRAM(s) Rectal every 6 hours PRN For Temp greater than 38 C (100.4 F)  dextrose Gel 1 Dose(s) Oral once PRN Blood Glucose LESS THAN 70 milliGRAM(s)/deciliter  glucagon  Injectable 1 milliGRAM(s) IntraMuscular once PRN Glucose LESS THAN 70 milligrams/deciliter      ALLERGIES:  Allergies    No Known Allergies    Intolerances        LABS:                        8.2    20.2  )-----------( 204      ( 01 May 2018 06:14 )             26.2     05-01    133<L>  |  91<L>  |  57<H>  ----------------------------<  242<H>  4.1   |  20<L>  |  3.93<H>    Ca    8.8      01 May 2018 05:58  Phos  6.6     05-01  Mg     1.9     05-01      PT/INR - ( 01 May 2018 05:58 )   PT: 16.9 sec;   INR: 1.51          PTT - ( 01 May 2018 05:58 )  PTT:71.6 sec      RADIOLOGY & ADDITIONAL TESTS: Reviewed.

## 2018-05-01 NOTE — PROGRESS NOTE ADULT - PROBLEM SELECTOR PLAN 4
Likely 2/2 to ischemic ATN in setting of sepsis with hypoperfusion (unknown baseline) presented with Cr 3.93 with metabolic derangements. Renal following, remains on HD. Permacath replaced multiple times over course for bacteremia and fungemia. Now with L subclavian HD cath.   - f/u renal recs for next HD. HD today.  -no longer on albumin.     #Adrenal Insufficiency- previously on fludrocortisone and prednisone. Started on stress dose steroids in setting of acute infection.  -c/w  tapering stress steroids     #Elevated AG-has fluctuated between 20-25, Bicarb 20-22. Lactate negative, glucose has been well controlled, likely 2/2 uremia in setting of ESRD.   - Monitor BMP

## 2018-05-01 NOTE — PROGRESS NOTE ADULT - ATTENDING COMMENTS
seen on HD, agree with above  cont rx for clearance and UF as tolerated  consider albumin for low BP with HD

## 2018-05-01 NOTE — PROGRESS NOTE ADULT - ATTENDING COMMENTS
Pt is chronically critically ill, on ventilator, tolerating some trach colar but mostly on PSV, came back to ICU after a suspected aspiration event with hypoxia and hypotension. He responded well to antibiotics and required increased vent support. He is back to his prior baseline and can be managed on the step down unit, currently not requiring ICU level of care.

## 2018-05-01 NOTE — PROGRESS NOTE ADULT - PROBLEM SELECTOR PLAN 7
S/p tracheostomy 4/5. Previous bedside ultrasound notable for b/l pleural effusions and atelectatic lung-Likely related to fluid overload. If clinically worsening, can consider thoracentesis for fluid studies and cultures. Course further complicated by septic shock from aspiration PNA.   - c/w daily CPAP trials, weaning to trach collar as tolerated  - c/w HD to remove excess fluid  -c/w zosyn

## 2018-05-01 NOTE — PROGRESS NOTE ADULT - PROBLEM SELECTOR PLAN 5
Likely 2/2 phlebotomy and CKD, No bloody output. Prior studies showed Anemia of chronic disease.   - monitor CBC, transfuse if Hgb <7  -Keep active T&S

## 2018-05-01 NOTE — PROGRESS NOTE ADULT - PROBLEM SELECTOR PLAN 6
DM2 on Januvia at home. HbA1C 8.6. Has been on Lantus 42U qHS, insulin 7 units q6h. Insulin was decreased to 21U for being NPO but noted to have complication of hypoglycemia.   -c/w MISS, Lantus 20 qHS and regular insulin 5U q6h.

## 2018-05-01 NOTE — PROGRESS NOTE ADULT - PROBLEM SELECTOR PLAN 1
Patient is a 63  year old male with acute on chronic renal failure from ischemic ATN. Patient is currently requiring hemodialysis.   Patient was last dialyzed 4/28 with 1L UF. Tolerated procedure well.    P -   Will do HD treatment today for UF and clearances with increased UF as tolerated with 2L UF.   Low K/Low phos/renal feeding.

## 2018-05-01 NOTE — PROGRESS NOTE ADULT - SUBJECTIVE AND OBJECTIVE BOX
PROGRESS NOTE    CC:  Overnight Events:  Interval History:   ROS:    OBJECTIVE  Vitals:  T(C): 36.6 (05-01-18 @ 14:15), Max: 36.8 (05-01-18 @ 06:00)  HR: 78 (05-01-18 @ 17:00) (70 - 98)  BP: 92/66 (05-01-18 @ 17:00) (72/46 - 103/71)  RR: 23 (05-01-18 @ 17:00) (13 - 37)  SpO2: 99% (05-01-18 @ 17:00) (97% - 100%)  Wt(kg): --  Mode: AC/ CMV (Assist Control/ Continuous Mandatory Ventilation)  RR (machine): 10  TV (machine): 500  FiO2: 40  PEEP: 5  ITime: 11.2  MAP: 10  PIP: 19    I/O:  I&O's Summary    30 Apr 2018 07:01  -  01 May 2018 07:00  --------------------------------------------------------  IN: 2556.5 mL / OUT: 0 mL / NET: 2556.5 mL    01 May 2018 07:01  -  01 May 2018 17:48  --------------------------------------------------------  IN: 1442 mL / OUT: 1800 mL / NET: -358 mL        PHYSICAL EXAM:  Appearance: NAD. Speaking in full sentences.   HEENT:   Conjunctiva clear b/l. Moist oral mucosa.  Cardiovascular: RRR with no murmurs.  Respiratory: Lungs CTAB.   Gastrointestinal:  Soft, nontender. Non-distended. Non-rigid.	  Extremities: No edema b/l. No erythema b/l. LE WWP b/l.  Vascular: DP 2+ b/l.  Neurologic:  Alert and awake. Moving all extremities. Following commands. Making eye contact.  	  LABS:                        8.2    20.2  )-----------( 204      ( 01 May 2018 06:14 )             26.2     05-01    133<L>  |  91<L>  |  57<H>  ----------------------------<  242<H>  4.1   |  20<L>  |  3.93<H>    Ca    8.8      01 May 2018 05:58  Phos  6.6     05-01  Mg     1.9     05-01      PT/INR - ( 01 May 2018 05:58 )   PT: 16.9 sec;   INR: 1.51          PTT - ( 01 May 2018 05:58 )  PTT:71.6 sec      RADIOLOGY & ADDITIONAL TESTS:  Reviewed .    MEDICATIONS  (STANDING):  aspirin  chewable 81 milliGRAM(s) Oral daily  atorvastatin 80 milliGRAM(s) Oral at bedtime  chlorhexidine 0.12% Liquid 15 milliLiter(s) Swish and Spit two times a day  chlorhexidine 2% Cloths 1 Application(s) Topical daily  dextrose 5%. 1000 milliLiter(s) (50 mL/Hr) IV Continuous <Continuous>  dextrose 50% Injectable 12.5 Gram(s) IV Push once  dextrose 50% Injectable 25 Gram(s) IV Push once  dextrose 50% Injectable 25 Gram(s) IV Push once  ergocalciferol Drops 6000 Unit(s) Oral daily  escitalopram 5 milliGRAM(s) Oral daily  folic acid 1 milliGRAM(s) Oral daily  heparin  Infusion 1400 Unit(s)/Hr (14 mL/Hr) IV Continuous <Continuous>  hydrocortisone sodium succinate Injectable 50 milliGRAM(s) IV Push every 8 hours  insulin glargine Injectable (LANTUS) 20 Unit(s) SubCutaneous at bedtime  insulin regular  human corrective regimen sliding scale   SubCutaneous every 6 hours  insulin regular  human recombinant 5 Unit(s) SubCutaneous every 6 hours  levETIRAcetam  Solution 250 milliGRAM(s) Oral <User Schedule>  levETIRAcetam  Solution 500 milliGRAM(s) Oral every 24 hours  metoclopramide 5 milliGRAM(s) Oral every 6 hours  midodrine 15 milliGRAM(s) Oral every 8 hours  modafinil 100 milliGRAM(s) Oral <User Schedule>  multivitamin 1 Tablet(s) Oral daily  pantoprazole   Suspension 40 milliGRAM(s) Oral daily  piperacillin/tazobactam IVPB. 2.25 Gram(s) IV Intermittent every 8 hours  silver sulfADIAZINE 1% Cream 1 Application(s) Topical daily  thiamine 100 milliGRAM(s) Oral daily  warfarin 5 milliGRAM(s) Oral once    MEDICATIONS  (PRN):  acetaminophen    Suspension. 650 milliGRAM(s) Oral every 6 hours PRN Moderate Pain (4 - 6)  acetaminophen  Suppository 650 milliGRAM(s) Rectal every 6 hours PRN For Temp greater than 38 C (100.4 F)  dextrose Gel 1 Dose(s) Oral once PRN Blood Glucose LESS THAN 70 milliGRAM(s)/deciliter  glucagon  Injectable 1 milliGRAM(s) IntraMuscular once PRN Glucose LESS THAN 70 milligrams/deciliter Transfer Acceptance [facility] to [facility] PGY-1    Hospital Course        Interval History:   ROS:    OBJECTIVE  Vitals:  T(C): 36.6 (05-01-18 @ 14:15), Max: 36.8 (05-01-18 @ 06:00)  HR: 78 (05-01-18 @ 17:00) (70 - 98)  BP: 92/66 (05-01-18 @ 17:00) (72/46 - 103/71)  RR: 23 (05-01-18 @ 17:00) (13 - 37)  SpO2: 99% (05-01-18 @ 17:00) (97% - 100%)  Wt(kg): --  Mode: AC/ CMV (Assist Control/ Continuous Mandatory Ventilation)  RR (machine): 10  TV (machine): 500  FiO2: 40  PEEP: 5  ITime: 11.2  MAP: 10  PIP: 19    I/O:  I&O's Summary    30 Apr 2018 07:01  -  01 May 2018 07:00  --------------------------------------------------------  IN: 2556.5 mL / OUT: 0 mL / NET: 2556.5 mL    01 May 2018 07:01  -  01 May 2018 17:48  --------------------------------------------------------  IN: 1442 mL / OUT: 1800 mL / NET: -358 mL        PHYSICAL EXAM:  Appearance: NAD.   HEENT:   Conjunctiva clear b/l. Moist oral mucosa.  Cardiovascular: RRR with no murmurs.  Respiratory: Lungs  Gastrointestinal:  Soft, nontender. Non-distended. Non-rigid.	  Extremities: No edema b/l. No erythema b/l. LE WWP b/l.  Vascular: DP 2+ b/l.  Neurologic:  Alert and awake. Moving all extremities. Following commands. Making eye contact.  	  LABS:                        8.2    20.2  )-----------( 204      ( 01 May 2018 06:14 )             26.2     05-01    133<L>  |  91<L>  |  57<H>  ----------------------------<  242<H>  4.1   |  20<L>  |  3.93<H>    Ca    8.8      01 May 2018 05:58  Phos  6.6     05-01  Mg     1.9     05-01      PT/INR - ( 01 May 2018 05:58 )   PT: 16.9 sec;   INR: 1.51          PTT - ( 01 May 2018 05:58 )  PTT:71.6 sec      RADIOLOGY & ADDITIONAL TESTS:  Reviewed .    MEDICATIONS  (STANDING):  aspirin  chewable 81 milliGRAM(s) Oral daily  atorvastatin 80 milliGRAM(s) Oral at bedtime  chlorhexidine 0.12% Liquid 15 milliLiter(s) Swish and Spit two times a day  chlorhexidine 2% Cloths 1 Application(s) Topical daily  dextrose 5%. 1000 milliLiter(s) (50 mL/Hr) IV Continuous <Continuous>  dextrose 50% Injectable 12.5 Gram(s) IV Push once  dextrose 50% Injectable 25 Gram(s) IV Push once  dextrose 50% Injectable 25 Gram(s) IV Push once  ergocalciferol Drops 6000 Unit(s) Oral daily  escitalopram 5 milliGRAM(s) Oral daily  folic acid 1 milliGRAM(s) Oral daily  heparin  Infusion 1400 Unit(s)/Hr (14 mL/Hr) IV Continuous <Continuous>  hydrocortisone sodium succinate Injectable 50 milliGRAM(s) IV Push every 8 hours  insulin glargine Injectable (LANTUS) 20 Unit(s) SubCutaneous at bedtime  insulin regular  human corrective regimen sliding scale   SubCutaneous every 6 hours  insulin regular  human recombinant 5 Unit(s) SubCutaneous every 6 hours  levETIRAcetam  Solution 250 milliGRAM(s) Oral <User Schedule>  levETIRAcetam  Solution 500 milliGRAM(s) Oral every 24 hours  metoclopramide 5 milliGRAM(s) Oral every 6 hours  midodrine 15 milliGRAM(s) Oral every 8 hours  modafinil 100 milliGRAM(s) Oral <User Schedule>  multivitamin 1 Tablet(s) Oral daily  pantoprazole   Suspension 40 milliGRAM(s) Oral daily  piperacillin/tazobactam IVPB. 2.25 Gram(s) IV Intermittent every 8 hours  silver sulfADIAZINE 1% Cream 1 Application(s) Topical daily  thiamine 100 milliGRAM(s) Oral daily  warfarin 5 milliGRAM(s) Oral once    MEDICATIONS  (PRN):  acetaminophen    Suspension. 650 milliGRAM(s) Oral every 6 hours PRN Moderate Pain (4 - 6)  acetaminophen  Suppository 650 milliGRAM(s) Rectal every 6 hours PRN For Temp greater than 38 C (100.4 F)  dextrose Gel 1 Dose(s) Oral once PRN Blood Glucose LESS THAN 70 milliGRAM(s)/deciliter  glucagon  Injectable 1 milliGRAM(s) IntraMuscular once PRN Glucose LESS THAN 70 milligrams/deciliter Transfer Acceptance [facility] to [facility] PGY-1    Hospital Course  63M PMH HFrEF 10-15% (ischemic), CAD (STEMI per [facility] records 7/17), s/p AICD, Afib, HTN, DM2 on insulin, CKD, and gout admitted for septic shock 2/2 LE cellulitis as well as concern for ATN in setting of shock. Pt was started on levophed for hypotension. Dobutamine given low mix venous saturation with concern for component of cardiogenic shock however had to be discontinued given tachycardia.  Pt also experienced tachycardia on Milrinone. Pt was started on Vanco/ Zosyn and completed total 11 days of abx. PT was started on solucortef 50 q6h given recent hx of steroid use. Gallium scan demosntrated LLE  cellulitis. TTE with no vegetations. Given worsening GILMER and low UOP, renal was consulted. HD cath was placed and pt was started on CVVD and eventually transitioned to intermittent HD. Pt was noted have b/l pulmonary effusion and required R chest tube placement with fluid studies showing exudative process. Given extensive cardiac hx and component of cardiogenic shock, cardiology was consulted. Pt was started on Vasopressin to improve blood pressure. Pt also stated on Isordil and Hydralazine but pt was not  able to tolerate the medications. Pt was started  on Hep gtt for Afib but switched to Argatroban given possibility of HIT. HIT was ruled out and patient was bridged to coumadin. Pt was stable for transfer to CCU for further management of cardiogenic shock however on 3/22, pt became unresponsive post A line placement. Vital signs were normal during event. Stroke code was called and he was intubated for airway protection. CT was negative MRI after 4 days noted to have chronic infarcts with possible small brainstem stroke per neurology read. He was started on Modafinil however mental status did not improve significantly. VEEG significant for slowing but no seizures. After GOC discussions with family patient had trach and PEG on 4/4 with plans to be discharged to [facility] when stable. On 4/8 patient was found to be fungemic with candida tropicalis and started on micafungin, then switched to fluconazole on 4/13 when sensitivities resulted. RUKHSANA was negative for vegetations or infection of AICD. CT A/P performed given transamnitis/ elevated bili but source of candidemia remains unclear. HD Permacath replaced and surveillance cultures had been negative for 48 hours. However overnight on 4/12 patient spiked fever again and blood, urine and sputum cultures returned positive for Klebsiella. He was started on meropenem for 1 day, then switched to Zosyn for 2 days and finally placed on ceftriaxone when sensitivities returned on 4/15. Patient restarted on hydrocortisone in attempt to wean off pressors. Thrombocytopenia resolved and patient restarted on heparin gtt for A-fib. Given continued low-grade fevers and uptrending white count, permacath removed on 4/19 and replaced with temp HD cath. The following weekend, noticed pt with seizure-like activity and VEEG placed. Per neuro, started on keppra 500 mg qd with additional 250cc keppra on post-HD days. End goal of L-tachy placement.  Transitioned to PO prednisone and fludrocortisone to replace hydrocortisone. L-tachy will not accept albumin pushes, and goal to wean patient off albumin during HD for hypotension. In addition, pt to have repeat MRI for prognostication of CVA. Pt also noticed to have L forearm eschar that has enlarged and increasing fluctuance. LUE US ordered also showing R superficial thrombus; reordered soft tissue US to eval for abscess in L forearm. Ortho for hand consulted for possible I&D- ultrasound demonstrated no drainage fluid- soft tissue for which recommending nothing to do. Albumin was weaned off during dialysis session. Warfarin was dosed and bridged with hep gtt. O/n patient noted to become more agitated and pulling at the trach for which patient given seroquel and wrist restraints. Also noted to have SBP in 80s for which patient given 500cc bolus. On AM labsnoted to have Hgb of 6.7 from 7.4- no bloody output noted. Given 1UPRBC. Of noted, some abdominal distension. After rounds, patient noted to be coughing up feeds/secretions- 100cc residuals noted. Aggressively suctioned. Patient started to become tachycardiac to HR 120s. CXR performed demonstrating new R lower lobe. During work up patient noted to have vent disconnected and concern for secretions and loose trach. Patient transferred back to MICU for concern of aspiration with increasing O2 requirements. Pt found to be hypotensive with MAPs in 50s requiring pressor support with levophed. L IJ placed for access. Zosyn started for concern of aspiration and started on stress dose steroids in setting of septic shock 2/2 aspiration PNA with underlying adrenal insufficiency. Weaned off pressors over the night and maintained on stress steroids and midodrine. MAPs maintained in 60s-70s with baseline SBPs. Pt maintained on ventilatory support with daily CPAP and trach collar trials as tolerated. Repeat CXR and resp exam with improvement in infiltrate. HD was continued per renal without requirement of albumin with end goal of L-TACH. Started on reglan for motility. Resumed feeds at low rate with pt tolerating well. Pt currently being tapered off stress steroids with goal of resuming previous regimen of prednisone 6 mg and fludrocortisone 0.1 mg daily if BP allows. Given his h/o afib and LV thrombus, he was resumed back on hep gtt bridging to coumadin. Course c/b hyperglycemia to 280s in setting of resumed on feeds and stress dose steroids. Was placed on insulin gtt 4/30 and titrated off, dosed for lantus 20units and premeal 5units TID. Actively adjusting insulin regimen daily. Pt otherwise has remained HDS and optimized for stepdown to [facility] for further management.       Interval History:   ROS:    OBJECTIVE  Vitals:  T(C): 36.6 (05-01-18 @ 14:15), Max: 36.8 (05-01-18 @ 06:00)  HR: 78 (05-01-18 @ 17:00) (70 - 98)  BP: 92/66 (05-01-18 @ 17:00) (72/46 - 103/71)  RR: 23 (05-01-18 @ 17:00) (13 - 37)  SpO2: 99% (05-01-18 @ 17:00) (97% - 100%)  Wt(kg): --  Mode: AC/ CMV (Assist Control/ Continuous Mandatory Ventilation)  RR (machine): 10  TV (machine): 500  FiO2: 40  PEEP: 5  ITime: 11.2  MAP: 10  PIP: 19    I/O:  I&O's Summary    30 Apr 2018 07:01  -  01 May 2018 07:00  --------------------------------------------------------  IN: 2556.5 mL / OUT: 0 mL / NET: 2556.5 mL    01 May 2018 07:01  -  01 May 2018 17:48  --------------------------------------------------------  IN: 1442 mL / OUT: 1800 mL / NET: -358 mL        PHYSICAL EXAM:  Appearance: NAD.   HEENT:   Conjunctiva clear b/l. Moist oral mucosa.  Cardiovascular: RRR with no murmurs.  Respiratory: Lungs  Gastrointestinal:  Soft, nontender. Non-distended. Non-rigid.	  Extremities: No edema b/l. No erythema b/l. LE WWP b/l.  Vascular: DP 2+ b/l.  Neurologic:  Alert and awake. Moving all extremities. Following commands. Making eye contact.  	  LABS:                        8.2    20.2  )-----------( 204      ( 01 May 2018 06:14 )             26.2     05-01    133<L>  |  91<L>  |  57<H>  ----------------------------<  242<H>  4.1   |  20<L>  |  3.93<H>    Ca    8.8      01 May 2018 05:58  Phos  6.6     05-01  Mg     1.9     05-01      PT/INR - ( 01 May 2018 05:58 )   PT: 16.9 sec;   INR: 1.51          PTT - ( 01 May 2018 05:58 )  PTT:71.6 sec      RADIOLOGY & ADDITIONAL TESTS:  Reviewed .    MEDICATIONS  (STANDING):  aspirin  chewable 81 milliGRAM(s) Oral daily  atorvastatin 80 milliGRAM(s) Oral at bedtime  chlorhexidine 0.12% Liquid 15 milliLiter(s) Swish and Spit two times a day  chlorhexidine 2% Cloths 1 Application(s) Topical daily  dextrose 5%. 1000 milliLiter(s) (50 mL/Hr) IV Continuous <Continuous>  dextrose 50% Injectable 12.5 Gram(s) IV Push once  dextrose 50% Injectable 25 Gram(s) IV Push once  dextrose 50% Injectable 25 Gram(s) IV Push once  ergocalciferol Drops 6000 Unit(s) Oral daily  escitalopram 5 milliGRAM(s) Oral daily  folic acid 1 milliGRAM(s) Oral daily  heparin  Infusion 1400 Unit(s)/Hr (14 mL/Hr) IV Continuous <Continuous>  hydrocortisone sodium succinate Injectable 50 milliGRAM(s) IV Push every 8 hours  insulin glargine Injectable (LANTUS) 20 Unit(s) SubCutaneous at bedtime  insulin regular  human corrective regimen sliding scale   SubCutaneous every 6 hours  insulin regular  human recombinant 5 Unit(s) SubCutaneous every 6 hours  levETIRAcetam  Solution 250 milliGRAM(s) Oral <User Schedule>  levETIRAcetam  Solution 500 milliGRAM(s) Oral every 24 hours  metoclopramide 5 milliGRAM(s) Oral every 6 hours  midodrine 15 milliGRAM(s) Oral every 8 hours  modafinil 100 milliGRAM(s) Oral <User Schedule>  multivitamin 1 Tablet(s) Oral daily  pantoprazole   Suspension 40 milliGRAM(s) Oral daily  piperacillin/tazobactam IVPB. 2.25 Gram(s) IV Intermittent every 8 hours  silver sulfADIAZINE 1% Cream 1 Application(s) Topical daily  thiamine 100 milliGRAM(s) Oral daily  warfarin 5 milliGRAM(s) Oral once    MEDICATIONS  (PRN):  acetaminophen    Suspension. 650 milliGRAM(s) Oral every 6 hours PRN Moderate Pain (4 - 6)  acetaminophen  Suppository 650 milliGRAM(s) Rectal every 6 hours PRN For Temp greater than 38 C (100.4 F)  dextrose Gel 1 Dose(s) Oral once PRN Blood Glucose LESS THAN 70 milliGRAM(s)/deciliter  glucagon  Injectable 1 milliGRAM(s) IntraMuscular once PRN Glucose LESS THAN 70 milligrams/deciliter Transfer Acceptance [facility] to [facility] PGY-1    Hospital Course  63M PMH HFrEF 10-15% (ischemic), CAD (STEMI per [facility] records 7/17), s/p AICD, Afib, HTN, DM2 on insulin, CKD, and gout admitted for septic shock 2/2 LE cellulitis as well as concern for ATN in setting of shock. Pt was started on levophed for hypotension. Dobutamine given low mix venous saturation with concern for component of cardiogenic shock however had to be discontinued given tachycardia.  Pt also experienced tachycardia on Milrinone. Pt was started on Vanco/ Zosyn and completed total 11 days of abx. PT was started on solucortef 50 q6h given recent hx of steroid use. Gallium scan demosntrated LLE  cellulitis. TTE with no vegetations. Given worsening GILMER and low UOP, renal was consulted. HD cath was placed and pt was started on CVVD and eventually transitioned to intermittent HD. Pt was noted have b/l pulmonary effusion and required R chest tube placement with fluid studies showing exudative process. Given extensive cardiac hx and component of cardiogenic shock, cardiology was consulted. Pt was started on Vasopressin to improve blood pressure. Pt also stated on Isordil and Hydralazine but pt was not  able to tolerate the medications. Pt was started  on Hep gtt for Afib but switched to Argatroban given possibility of HIT. HIT was ruled out and patient was bridged to coumadin. Pt was stable for transfer to CCU for further management of cardiogenic shock however on 3/22, pt became unresponsive post A line placement. Vital signs were normal during event. Stroke code was called and he was intubated for airway protection. CT was negative MRI after 4 days noted to have chronic infarcts with possible small brainstem stroke per neurology read. He was started on Modafinil however mental status did not improve significantly. VEEG significant for slowing but no seizures. After GOC discussions with family patient had trach and PEG on 4/4 with plans to be discharged to [facility] when stable. On 4/8 patient was found to be fungemic with candida tropicalis and started on micafungin, then switched to fluconazole on 4/13 when sensitivities resulted. RUKHSANA was negative for vegetations or infection of AICD. CT A/P performed given transamnitis/ elevated bili but source of candidemia remains unclear. HD Permacath replaced and surveillance cultures had been negative for 48 hours. However overnight on 4/12 patient spiked fever again and blood, urine and sputum cultures returned positive for Klebsiella. He was started on meropenem for 1 day, then switched to Zosyn for 2 days and finally placed on ceftriaxone when sensitivities returned on 4/15. Patient restarted on hydrocortisone in attempt to wean off pressors. Thrombocytopenia resolved and patient restarted on heparin gtt for A-fib. Given continued low-grade fevers and uptrending white count, permacath removed on 4/19 and replaced with temp HD cath. The following weekend, noticed pt with seizure-like activity and VEEG placed. Per neuro, started on keppra 500 mg qd with additional 250cc keppra on post-HD days. End goal of L-tachy placement.  Transitioned to PO prednisone and fludrocortisone to replace hydrocortisone. L-tachy will not accept albumin pushes, and goal to wean patient off albumin during HD for hypotension. In addition, pt to have repeat MRI for prognostication of CVA. Pt also noticed to have L forearm eschar that has enlarged and increasing fluctuance. LUE US ordered also showing R superficial thrombus; reordered soft tissue US to eval for abscess in L forearm. Ortho for hand consulted for possible I&D- ultrasound demonstrated no drainage fluid- soft tissue for which recommending nothing to do. Albumin was weaned off during dialysis session. Warfarin was dosed and bridged with hep gtt. O/n patient noted to become more agitated and pulling at the trach for which patient given seroquel and wrist restraints. Also noted to have SBP in 80s for which patient given 500cc bolus. On AM labsnoted to have Hgb of 6.7 from 7.4- no bloody output noted. Given 1UPRBC. Of noted, some abdominal distension. After rounds, patient noted to be coughing up feeds/secretions- 100cc residuals noted. Aggressively suctioned. Patient started to become tachycardiac to HR 120s. CXR performed demonstrating new R lower lobe. During work up patient noted to have vent disconnected and concern for secretions and loose trach. Patient transferred back to MICU for concern of aspiration with increasing O2 requirements. Pt found to be hypotensive with MAPs in 50s requiring pressor support with levophed. L IJ placed for access. Zosyn started for concern of aspiration and started on stress dose steroids in setting of septic shock 2/2 aspiration PNA with underlying adrenal insufficiency. Weaned off pressors over the night and maintained on stress steroids and midodrine. MAPs maintained in 60s-70s with baseline SBPs. Pt maintained on ventilatory support with daily CPAP and trach collar trials as tolerated. Repeat CXR and resp exam with improvement in infiltrate. HD was continued per renal without requirement of albumin with end goal of L-TACH. Started on reglan for motility. Resumed feeds at low rate with pt tolerating well. Pt currently being tapered off stress steroids with goal of resuming previous regimen of prednisone 6 mg and fludrocortisone 0.1 mg daily if BP allows. Given his h/o afib and LV thrombus, he was resumed back on hep gtt bridging to coumadin. Course c/b hyperglycemia to 280s in setting of resumed on feeds and stress dose steroids. Was placed on insulin gtt 4/30 and titrated off, dosed for lantus 20units and premeal 5units TID. Actively adjusting insulin regimen daily. Pt otherwise has remained HDS and optimized for stepdown to [facility] for further management.       Interval History: Minimal responsive to verbal or painful stimuli. Unable to assess.  ROS: As above.    OBJECTIVE  Vitals:  T(C): 36.6 (05-01-18 @ 14:15), Max: 36.8 (05-01-18 @ 06:00)  HR: 78 (05-01-18 @ 17:00) (70 - 98)  BP: 92/66 (05-01-18 @ 17:00) (72/46 - 103/71)  RR: 23 (05-01-18 @ 17:00) (13 - 37)  SpO2: 99% (05-01-18 @ 17:00) (97% - 100%)  Wt(kg): --  Mode: AC/ CMV (Assist Control/ Continuous Mandatory Ventilation)  RR (machine): 10  TV (machine): 500  FiO2: 40  PEEP: 5  ITime: 11.2  MAP: 10  PIP: 19    I/O:  I&O's Summary    30 Apr 2018 07:01  -  01 May 2018 07:00  --------------------------------------------------------  IN: 2556.5 mL / OUT: 0 mL / NET: 2556.5 mL    01 May 2018 07:01  -  01 May 2018 17:48  --------------------------------------------------------  IN: 1442 mL / OUT: 1800 mL / NET: -358 mL        PHYSICAL EXAM:  Appearance: NAD. Trach and peg, with minimal response to painful or verbal stimuli.   HEENT:  Difficulty assessing with minimal eye opening. Conjunctiva clear b/l. Moist oral mucosa.  Cardiovascular: RRR, S1, S2 with no murmurs. Chest: R chest wall HD cath, [facility].   Respiratory: Lungs with coarse breath sounds on R>L. Trah in place.   Gastrointestinal:  Soft, nontender. Distension with tympany to percussion. 	  Extremities: 2+ edema b/l to mid calf. No erythema b/l. LE WWP b/l.  Vascular: DP 2+ b/l.  Neurologic:  Minimal response to verbal/ painful stimuli- will open eyes with painful stimuli. Minimal command following. Noted twitching of mouth. Minimal movement of b/l upper and lower extremities R>L.   	  LABS:                        8.2    20.2  )-----------( 204      ( 01 May 2018 06:14 )             26.2     05-01    133<L>  |  91<L>  |  57<H>  ----------------------------<  242<H>  4.1   |  20<L>  |  3.93<H>    Ca    8.8      01 May 2018 05:58  Phos  6.6     05-01  Mg     1.9     05-01      PT/INR - ( 01 May 2018 05:58 )   PT: 16.9 sec;   INR: 1.51          PTT - ( 01 May 2018 05:58 )  PTT:71.6 sec      RADIOLOGY & ADDITIONAL TESTS:  Reviewed .    MEDICATIONS  (STANDING):  aspirin  chewable 81 milliGRAM(s) Oral daily  atorvastatin 80 milliGRAM(s) Oral at bedtime  chlorhexidine 0.12% Liquid 15 milliLiter(s) Swish and Spit two times a day  chlorhexidine 2% Cloths 1 Application(s) Topical daily  dextrose 5%. 1000 milliLiter(s) (50 mL/Hr) IV Continuous <Continuous>  dextrose 50% Injectable 12.5 Gram(s) IV Push once  dextrose 50% Injectable 25 Gram(s) IV Push once  dextrose 50% Injectable 25 Gram(s) IV Push once  ergocalciferol Drops 6000 Unit(s) Oral daily  escitalopram 5 milliGRAM(s) Oral daily  folic acid 1 milliGRAM(s) Oral daily  heparin  Infusion 1400 Unit(s)/Hr (14 mL/Hr) IV Continuous <Continuous>  hydrocortisone sodium succinate Injectable 50 milliGRAM(s) IV Push every 8 hours  insulin glargine Injectable (LANTUS) 20 Unit(s) SubCutaneous at bedtime  insulin regular  human corrective regimen sliding scale   SubCutaneous every 6 hours  insulin regular  human recombinant 5 Unit(s) SubCutaneous every 6 hours  levETIRAcetam  Solution 250 milliGRAM(s) Oral <User Schedule>  levETIRAcetam  Solution 500 milliGRAM(s) Oral every 24 hours  metoclopramide 5 milliGRAM(s) Oral every 6 hours  midodrine 15 milliGRAM(s) Oral every 8 hours  modafinil 100 milliGRAM(s) Oral <User Schedule>  multivitamin 1 Tablet(s) Oral daily  pantoprazole   Suspension 40 milliGRAM(s) Oral daily  piperacillin/tazobactam IVPB. 2.25 Gram(s) IV Intermittent every 8 hours  silver sulfADIAZINE 1% Cream 1 Application(s) Topical daily  thiamine 100 milliGRAM(s) Oral daily  warfarin 5 milliGRAM(s) Oral once    MEDICATIONS  (PRN):  acetaminophen    Suspension. 650 milliGRAM(s) Oral every 6 hours PRN Moderate Pain (4 - 6)  acetaminophen  Suppository 650 milliGRAM(s) Rectal every 6 hours PRN For Temp greater than 38 C (100.4 F)  dextrose Gel 1 Dose(s) Oral once PRN Blood Glucose LESS THAN 70 milliGRAM(s)/deciliter  glucagon  Injectable 1 milliGRAM(s) IntraMuscular once PRN Glucose LESS THAN 70 milligrams/deciliter

## 2018-05-02 LAB
ANION GAP SERPL CALC-SCNC: 18 MMOL/L — HIGH (ref 5–17)
APTT BLD: 78 SEC — HIGH (ref 27.5–37.4)
BASOPHILS NFR BLD AUTO: 0 % — SIGNIFICANT CHANGE UP (ref 0–2)
BUN SERPL-MCNC: 52 MG/DL — HIGH (ref 7–23)
CALCIUM SERPL-MCNC: 9 MG/DL — SIGNIFICANT CHANGE UP (ref 8.4–10.5)
CHLORIDE SERPL-SCNC: 91 MMOL/L — LOW (ref 96–108)
CO2 SERPL-SCNC: 24 MMOL/L — SIGNIFICANT CHANGE UP (ref 22–31)
CREAT SERPL-MCNC: 3.29 MG/DL — HIGH (ref 0.5–1.3)
CULTURE RESULTS: SIGNIFICANT CHANGE UP
EOSINOPHIL NFR BLD AUTO: 0 % — SIGNIFICANT CHANGE UP (ref 0–6)
GLUCOSE BLDC GLUCOMTR-MCNC: 133 MG/DL — HIGH (ref 70–99)
GLUCOSE BLDC GLUCOMTR-MCNC: 203 MG/DL — HIGH (ref 70–99)
GLUCOSE BLDC GLUCOMTR-MCNC: 205 MG/DL — HIGH (ref 70–99)
GLUCOSE BLDC GLUCOMTR-MCNC: 266 MG/DL — HIGH (ref 70–99)
GLUCOSE BLDC GLUCOMTR-MCNC: 296 MG/DL — HIGH (ref 70–99)
GLUCOSE SERPL-MCNC: 121 MG/DL — HIGH (ref 70–99)
HBV SURFACE AG SER-ACNC: SIGNIFICANT CHANGE UP
HCT VFR BLD CALC: 30.9 % — LOW (ref 39–50)
HGB BLD-MCNC: 9.4 G/DL — LOW (ref 13–17)
INR BLD: 1.62 — HIGH (ref 0.88–1.16)
LYMPHOCYTES # BLD AUTO: 4.2 % — LOW (ref 13–44)
MAGNESIUM SERPL-MCNC: 1.9 MG/DL — SIGNIFICANT CHANGE UP (ref 1.6–2.6)
MCHC RBC-ENTMCNC: 27.6 PG — SIGNIFICANT CHANGE UP (ref 27–34)
MCHC RBC-ENTMCNC: 30.4 G/DL — LOW (ref 32–36)
MCV RBC AUTO: 90.9 FL — SIGNIFICANT CHANGE UP (ref 80–100)
MONOCYTES NFR BLD AUTO: 4.5 % — SIGNIFICANT CHANGE UP (ref 2–14)
NEUTROPHILS NFR BLD AUTO: 91.3 % — HIGH (ref 43–77)
PHOSPHATE SERPL-MCNC: 5 MG/DL — HIGH (ref 2.5–4.5)
PLATELET # BLD AUTO: 205 K/UL — SIGNIFICANT CHANGE UP (ref 150–400)
POTASSIUM SERPL-MCNC: 4.3 MMOL/L — SIGNIFICANT CHANGE UP (ref 3.5–5.3)
POTASSIUM SERPL-SCNC: 4.3 MMOL/L — SIGNIFICANT CHANGE UP (ref 3.5–5.3)
PROTHROM AB SERPL-ACNC: 18.2 SEC — HIGH (ref 9.8–12.7)
RBC # BLD: 3.4 M/UL — LOW (ref 4.2–5.8)
RBC # FLD: 17.2 % — HIGH (ref 10.3–16.9)
SODIUM SERPL-SCNC: 133 MMOL/L — LOW (ref 135–145)
SPECIMEN SOURCE: SIGNIFICANT CHANGE UP
WBC # BLD: 14.7 K/UL — HIGH (ref 3.8–10.5)
WBC # FLD AUTO: 14.7 K/UL — HIGH (ref 3.8–10.5)

## 2018-05-02 PROCEDURE — 71045 X-RAY EXAM CHEST 1 VIEW: CPT | Mod: 26

## 2018-05-02 PROCEDURE — 90935 HEMODIALYSIS ONE EVALUATION: CPT | Mod: GC

## 2018-05-02 RX ORDER — WARFARIN SODIUM 2.5 MG/1
5 TABLET ORAL ONCE
Qty: 0 | Refills: 0 | Status: COMPLETED | OUTPATIENT
Start: 2018-05-02 | End: 2018-05-02

## 2018-05-02 RX ORDER — INSULIN HUMAN 100 [IU]/ML
6 INJECTION, SOLUTION SUBCUTANEOUS EVERY 6 HOURS
Qty: 0 | Refills: 0 | Status: DISCONTINUED | OUTPATIENT
Start: 2018-05-03 | End: 2018-05-03

## 2018-05-02 RX ADMIN — Medication 1 TABLET(S): at 17:10

## 2018-05-02 RX ADMIN — INSULIN HUMAN 4: 100 INJECTION, SOLUTION SUBCUTANEOUS at 18:26

## 2018-05-02 RX ADMIN — Medication 5 MILLIGRAM(S): at 05:48

## 2018-05-02 RX ADMIN — PIPERACILLIN AND TAZOBACTAM 200 GRAM(S): 4; .5 INJECTION, POWDER, LYOPHILIZED, FOR SOLUTION INTRAVENOUS at 21:38

## 2018-05-02 RX ADMIN — MODAFINIL 100 MILLIGRAM(S): 200 TABLET ORAL at 06:02

## 2018-05-02 RX ADMIN — Medication 100 MILLIGRAM(S): at 17:10

## 2018-05-02 RX ADMIN — Medication 1 MILLIGRAM(S): at 17:10

## 2018-05-02 RX ADMIN — Medication 81 MILLIGRAM(S): at 17:10

## 2018-05-02 RX ADMIN — MIDODRINE HYDROCHLORIDE 15 MILLIGRAM(S): 2.5 TABLET ORAL at 05:49

## 2018-05-02 RX ADMIN — CHLORHEXIDINE GLUCONATE 15 MILLILITER(S): 213 SOLUTION TOPICAL at 09:24

## 2018-05-02 RX ADMIN — CHLORHEXIDINE GLUCONATE 15 MILLILITER(S): 213 SOLUTION TOPICAL at 21:37

## 2018-05-02 RX ADMIN — ATORVASTATIN CALCIUM 80 MILLIGRAM(S): 80 TABLET, FILM COATED ORAL at 21:37

## 2018-05-02 RX ADMIN — Medication 5 MILLIGRAM(S): at 16:50

## 2018-05-02 RX ADMIN — INSULIN GLARGINE 20 UNIT(S): 100 INJECTION, SOLUTION SUBCUTANEOUS at 21:41

## 2018-05-02 RX ADMIN — ESCITALOPRAM OXALATE 5 MILLIGRAM(S): 10 TABLET, FILM COATED ORAL at 17:22

## 2018-05-02 RX ADMIN — Medication 50 MILLIGRAM(S): at 16:49

## 2018-05-02 RX ADMIN — INSULIN HUMAN 5 UNIT(S): 100 INJECTION, SOLUTION SUBCUTANEOUS at 18:26

## 2018-05-02 RX ADMIN — INSULIN HUMAN 5 UNIT(S): 100 INJECTION, SOLUTION SUBCUTANEOUS at 06:35

## 2018-05-02 RX ADMIN — ERGOCALCIFEROL 6000 UNIT(S): 1.25 CAPSULE ORAL at 16:50

## 2018-05-02 RX ADMIN — PIPERACILLIN AND TAZOBACTAM 200 GRAM(S): 4; .5 INJECTION, POWDER, LYOPHILIZED, FOR SOLUTION INTRAVENOUS at 05:47

## 2018-05-02 RX ADMIN — Medication 1 APPLICATION(S): at 16:49

## 2018-05-02 RX ADMIN — PANTOPRAZOLE SODIUM 40 MILLIGRAM(S): 20 TABLET, DELAYED RELEASE ORAL at 16:51

## 2018-05-02 RX ADMIN — MIDODRINE HYDROCHLORIDE 15 MILLIGRAM(S): 2.5 TABLET ORAL at 16:50

## 2018-05-02 RX ADMIN — Medication 50 MILLIGRAM(S): at 05:45

## 2018-05-02 RX ADMIN — LEVETIRACETAM 500 MILLIGRAM(S): 250 TABLET, FILM COATED ORAL at 09:23

## 2018-05-02 RX ADMIN — CHLORHEXIDINE GLUCONATE 1 APPLICATION(S): 213 SOLUTION TOPICAL at 05:42

## 2018-05-02 RX ADMIN — MODAFINIL 100 MILLIGRAM(S): 200 TABLET ORAL at 17:22

## 2018-05-02 RX ADMIN — PIPERACILLIN AND TAZOBACTAM 200 GRAM(S): 4; .5 INJECTION, POWDER, LYOPHILIZED, FOR SOLUTION INTRAVENOUS at 16:49

## 2018-05-02 RX ADMIN — WARFARIN SODIUM 5 MILLIGRAM(S): 2.5 TABLET ORAL at 21:39

## 2018-05-02 RX ADMIN — Medication 5 MILLIGRAM(S): at 21:38

## 2018-05-02 NOTE — PROGRESS NOTE ADULT - PROBLEM SELECTOR PLAN 1
Course complicated by septic shock 2/2 to aspiration with increasing O2 requirements for which patient was back in MICU. Tx with Zosyn. Resolving septic shock-requiring levophed. No longer on pressors. Pending wean from Solucortef.   -c/w Zosyn     #Bacteremia  Noted to have Klebsiella bacteremia with associated Klebsiella in urine and sputum for which completed ceftriaxone course (4/15-4/26). Resolved.    #Fungemia  Noted to have Candida Tropicalis growing in blood cultures and completed fluconazole course. Resolved.

## 2018-05-02 NOTE — PROGRESS NOTE ADULT - PROBLEM SELECTOR PLAN 3
Acute change in mental status likely 2/2 brainstem infarct with component of encephalopathy from infection. 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 Per neurology may have had ischemic event from hypoperfusion. Also showing disc protrusion at C3/C4 level coursing superiorly to the C2/C3 contacting the ventral spinal cord. Per neurology, this may be contributing to weakness, however would not explain change in mental status.  Exam limited- minimal responsiveness to verbal painful stimuli.   - c/w Modafinil  - MRI head repeat for prognostication of CVA- ordered    #Seizures- c/w keppra 500 mg qd with extra 250 mg post HD days Acute change in mental status likely 2/2 brainstem infarct with component of encephalopathy from infection. 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 Per neurology may have had ischemic event from hypoperfusion. Also showing disc protrusion at C3/C4 level coursing superiorly to the C2/C3 contacting the ventral spinal cord. Per neurology, this may be contributing to weakness, however would not explain change in mental status.  Exam limited- minimal responsiveness to verbal painful stimuli.   - c/w Modafinil  - MRI head repeat for prognostication of CVA- ordered but given complications of course has been delayed.  -Exam notable for more lethargy, moving R side> left.     #Seizures- c/w keppra 500 mg qd with extra 250 mg post HD days

## 2018-05-02 NOTE — PROGRESS NOTE ADULT - PROBLEM SELECTOR PLAN 2
Acute on chronic systolic CHF with LVEF 15 % and severely low blood pressure  Off IV pressors and inotropic support at present.  Optimize CHF treatment per primary/Cardiology team  Fluid restriction to <1L/day  Daily weight  Strict I/o  Goal fluid balance to be net negative for now.

## 2018-05-02 NOTE — PROGRESS NOTE ADULT - PROBLEM SELECTOR PLAN 4
Likely 2/2 to ischemic ATN in setting of sepsis with hypoperfusion (unknown baseline) presented with Cr 3.93 with metabolic derangements. Renal following, remains on HD. Permacath replaced multiple times over course for bacteremia and fungemia. Now with L subclavian HD cath.   - f/u renal recs for next HD. HD today.  -no longer on albumin.     #Adrenal Insufficiency- previously on fludrocortisone and prednisone. Started on stress dose steroids in setting of acute infection.  -c/w  tapering stress steroids     #Elevated AG-has fluctuated between 20-25, Bicarb 20-22. Lactate negative, glucose has been well controlled, likely 2/2 uremia in setting of ESRD.   - Monitor BMP Likely 2/2 to ischemic ATN in setting of sepsis with hypoperfusion (unknown baseline) presented with Cr 3.93 with metabolic derangements. Renal following, remains on HD. Permacath replaced multiple times over course for bacteremia and fungemia. Now with L subclavian HD cath.   - f/u renal recs- Undergoing HD today.  -Remains off albumin.    #Adrenal Insufficiency- previously on fludrocortisone and prednisone. Started on stress dose steroids in setting of acute infection.  -off steroids.    #Elevated AG-has fluctuated between 20-25, Bicarb 20-22. Lactate negative, glucose has been well controlled, likely 2/2 uremia in setting of ESRD.   - Monitor BMP

## 2018-05-02 NOTE — PROGRESS NOTE ADULT - PROBLEM SELECTOR PLAN 10
VTE: Hep gtt, Coumadin  GI PPx: PPI    FULL CODE    F: No IVF in setting of HD.  E: Replete electrolytes with caution in setting of HD.   N:  Restarted on tube feeds with Vital 1.5 Terrell at 63cc/hr    Dispo: Initially admitted to MICU but now on 7LA for further management of hypotension in setting of shock, acute encephalopathy, bacteremia and acute on chronic respiratory failure. Course complicated by Septic shock 2/2 to aspiration PNA for which patient now off levophed. Transferred back to [facility] for further management of aspiraton PNA, chronic respiratory failure, hypotension and glucose control. Dispo Pending L-tACH with clinical stabilization. VTE: Hep gtt, Coumadin  GI PPx: PPI    FULL CODE    F: No IVF in setting of HD.  E: Replete electrolytes with caution in setting of HD.   N:  Restarted on tube feeds with Vital 1.5 Terrell at 63cc/hr    Dispo: Initially admitted to MICU but now on 7LACH for further management of hypotension in setting of shock, acute encephalopathy, bacteremia and acute on chronic respiratory failure. Course complicated by Septic shock 2/2 to aspiration PNA for which patient now off levophed. Now on 7LACH for further management of aspiraton PNA, chronic respiratory failure, hypotension and glucose control. Dispo Pending L-tACH with clinical stabilization.

## 2018-05-02 NOTE — PROGRESS NOTE ADULT - PROBLEM SELECTOR PLAN 9
Hx of Afib and LV thrombus on coumadin prior to admission. TTE with Definity shows no LV thrombus currently   - c/w holding Metoprolol 12.5 q12h given hypotension and HR as been controlled in 70-90s. Will use Dig for rate control if RVR- goal <110  -c/w hep gtt and continue with bridging to coumadin.       #LV thrombus  LV thrombus noted on RUKHSANA on 4/10 for which patient has been on hep gtt. Plan to bridge to coumadin tonight.   -dosed for coumadin x2 and remains subtherapeutic with INR this AM at 1.47, c/w hep gtt and dosed again for tonight. Hx of Afib and LV thrombus on coumadin prior to admission. Most recent TTE with Definity shows no LV thrombus currently.   - c/w holding Metoprolol 12.5 q12h given hypotension and HR as been controlled in 70-90s. Will use Dig for rate control if RVR- goal <110  -c/w hep gtt and continue with bridging to coumadin.       #LV thrombus  LV thrombus noted on RUKHSANA on 4/10 for which patient has been on hep gtt. C/w bridge.  -Dose for coumadin tonight. INR at

## 2018-05-02 NOTE — PROGRESS NOTE ADULT - SUBJECTIVE AND OBJECTIVE BOX
Patient is a 63y Male seen and evaluated at bedside. Patient lying in bed in no acute disress at present remains on ventilatory support through tracheostomy. Last HD on 5/1 with UF 1.8 Liter tolerated procedure well. Interval transfer to stepdown unit last evening.       acetaminophen    Suspension. 650 every 6 hours PRN  acetaminophen  Suppository 650 every 6 hours PRN  aspirin  chewable 81 daily  atorvastatin 80 at bedtime  chlorhexidine 0.12% Liquid 15 two times a day  chlorhexidine 2% Cloths 1 daily  dextrose 5%. 1000 <Continuous>  dextrose 50% Injectable 12.5 once  dextrose 50% Injectable 25 once  dextrose 50% Injectable 25 once  dextrose Gel 1 once PRN  ergocalciferol Drops 6000 daily  escitalopram 5 daily  folic acid 1 daily  glucagon  Injectable 1 once PRN  heparin  Infusion 1400 <Continuous>  hydrocortisone sodium succinate Injectable 50 every 8 hours  insulin glargine Injectable (LANTUS) 20 at bedtime  insulin regular  human corrective regimen sliding scale  every 6 hours  insulin regular  human recombinant 5 every 6 hours  levETIRAcetam  Solution 250 <User Schedule>  levETIRAcetam  Solution 500 every 24 hours  metoclopramide 5 every 6 hours  midodrine 15 every 8 hours  modafinil 100 <User Schedule>  multivitamin 1 daily  pantoprazole   Suspension 40 daily  piperacillin/tazobactam IVPB. 2.25 every 8 hours  silver sulfADIAZINE 1% Cream 1 daily  thiamine 100 daily      Allergies    No Known Allergies    Intolerances        T(C): , Max: 37.4 (05-02-18 @ 10:01)  T(F): , Max: 99.4 (05-02-18 @ 10:01)  HR: 86 (05-02-18 @ 08:32)  BP: 98/66 (05-02-18 @ 08:32)  BP(mean): 78 (05-02-18 @ 08:32)  RR: 16 (05-02-18 @ 08:32)  SpO2: 100% (05-02-18 @ 08:32)  Wt(kg): --    05-01 @ 07:01  -  05-02 @ 07:00  --------------------------------------------------------  IN: 1973 mL / OUT: 1800 mL / NET: 173 mL      Review of Systems:  Limited. Patient remains confused and disoriented. Unable to follow verbal commands remains on ventilatory support through tracheostomy.      PHYSICAL EXAM:  GENERAL Ill appearing, lying in bed, confused and disoriented in no acute distress at present on ventilatory support   HEAD:  Atraumatic, Normocephalic,   EYES: Bilateral conjuctival and scleral pallor+nt  Oral cavity: Oral mucosa dry and pale  NECK: Neck supple, No JVP, Tracheostomy present.   CHEST/LUNG: Bilateral decreased breath sounds, Bibasilar rales and crepitatiosn+nt, no wheezing  HEART: Regular rate and rhythm. HOMER II/VI at LPSB, No gallop, no rub   ABDOMEN: Soft, Nontender, non distended, PEG tube present. BS+nt, No flank tenderness.   EXTREMITIES: Bilateral thigh and dependent abdominal/thigh edema present No clubbing, cyanosis  Neurology: AAOx1, confused and disoriented, Able to move extremities.   SKIN: No rashes or lesions          ACCESS: Right chest wall tunnel HD catheter present.         LABS:                        9.4    14.7  )-----------( 205      ( 02 May 2018 06:59 )             30.9     05-02    133<L>  |  91<L>  |  52<H>  ----------------------------<  121<H>  4.3   |  24  |  3.29<H>    Ca    9.0      02 May 2018 06:59  Phos  5.0     05-02  Mg     1.9     05-02        PT/INR - ( 02 May 2018 06:59 )   PT: 18.2 sec;   INR: 1.62          PTT - ( 02 May 2018 06:59 )  PTT:78.0 sec          RADIOLOGY & ADDITIONAL STUDIES:  < from: Xray Chest 1 View- PORTABLE-Routine (05.01.18 @ 05:57) >    EXAM:  XR CHEST PORTABLE ROUTINE 1V                          PROCEDURE DATE:  05/01/2018                     INTERPRETATION:  Portable chest    History: Aspiration pneumonia follow-up abnormal exam    Hypoinflation. Possible infiltrate in the rightlower lung appearing or   better seen compared to prior exam 4/30/2018. No other definite change.            "Thank you for the opportunity to participate in the care of this   patient."        HESHAM BERRIOS M.D., ATTENDING RADIOLOGIST  This document has been electronically signed. May  1 2018 12:53PM                  < end of copied text >

## 2018-05-02 NOTE — PROGRESS NOTE ADULT - SUBJECTIVE AND OBJECTIVE BOX
Patient was seen and evaluated on dialysis.   Patient is tolerating the procedure well.   HR: 99 (05-02-18 @ 12:35)  BP: 93/72 (05-02-18 @ 12:35)  Continue dialysis:   Dialyzer:  Revaclear 300        QB: 300      QD: 500 3K+   Goal UF 2kg over 3 Hours

## 2018-05-02 NOTE — PROGRESS NOTE ADULT - SUBJECTIVE AND OBJECTIVE BOX
PROGRESS NOTE    CC:  Overnight Events:  Interval History:   ROS:    OBJECTIVE  Vitals:  T(C): 36.5 (05-02-18 @ 06:00), Max: 36.6 (05-01-18 @ 14:15)  HR: 87 (05-02-18 @ 06:41) (6 - 94)  BP: 106/74 (05-02-18 @ 05:06) (72/46 - 107/77)  RR: 17 (05-02-18 @ 05:06) (15 - 25)  SpO2: 100% (05-02-18 @ 06:41) (97% - 100%)  Wt(kg): --  Mode: AC/ CMV (Assist Control/ Continuous Mandatory Ventilation)  RR (machine): 10  TV (machine): 500  FiO2: 40  PEEP: 5  ITime: 1  MAP: 9.8  PIP: 20    I/O:  I&O's Summary    01 May 2018 07:01  -  02 May 2018 07:00  --------------------------------------------------------  IN: 1554 mL / OUT: 1800 mL / NET: -246 mL        PHYSICAL EXAM:  Appearance: NAD. Speaking in full sentences.   HEENT:   Conjunctiva clear b/l. Moist oral mucosa.  Cardiovascular: RRR with no murmurs.  Respiratory: Lungs CTAB.   Gastrointestinal:  Soft, nontender. Non-distended. Non-rigid.	  Extremities: No edema b/l. No erythema b/l. LE WWP b/l.  Vascular: DP 2+ b/l.  Neurologic:  Alert and awake. Moving all extremities. Following commands. Making eye contact.  	  LABS:                        8.5    19.3  )-----------( 170      ( 01 May 2018 18:59 )             27.2     05-01    133<L>  |  91<L>  |  57<H>  ----------------------------<  242<H>  4.1   |  20<L>  |  3.93<H>    Ca    8.8      01 May 2018 05:58  Phos  6.6     05-01  Mg     1.9     05-01      PT/INR - ( 01 May 2018 05:58 )   PT: 16.9 sec;   INR: 1.51          PTT - ( 01 May 2018 05:58 )  PTT:71.6 sec      RADIOLOGY & ADDITIONAL TESTS:  Reviewed .    MEDICATIONS  (STANDING):  aspirin  chewable 81 milliGRAM(s) Oral daily  atorvastatin 80 milliGRAM(s) Oral at bedtime  chlorhexidine 0.12% Liquid 15 milliLiter(s) Swish and Spit two times a day  chlorhexidine 2% Cloths 1 Application(s) Topical daily  dextrose 5%. 1000 milliLiter(s) (50 mL/Hr) IV Continuous <Continuous>  dextrose 50% Injectable 12.5 Gram(s) IV Push once  dextrose 50% Injectable 25 Gram(s) IV Push once  dextrose 50% Injectable 25 Gram(s) IV Push once  ergocalciferol Drops 6000 Unit(s) Oral daily  escitalopram 5 milliGRAM(s) Oral daily  folic acid 1 milliGRAM(s) Oral daily  heparin  Infusion 1400 Unit(s)/Hr (14 mL/Hr) IV Continuous <Continuous>  hydrocortisone sodium succinate Injectable 50 milliGRAM(s) IV Push every 8 hours  insulin glargine Injectable (LANTUS) 20 Unit(s) SubCutaneous at bedtime  insulin regular  human corrective regimen sliding scale   SubCutaneous every 6 hours  insulin regular  human recombinant 5 Unit(s) SubCutaneous every 6 hours  levETIRAcetam  Solution 250 milliGRAM(s) Oral <User Schedule>  levETIRAcetam  Solution 500 milliGRAM(s) Oral every 24 hours  metoclopramide 5 milliGRAM(s) Oral every 6 hours  midodrine 15 milliGRAM(s) Oral every 8 hours  modafinil 100 milliGRAM(s) Oral <User Schedule>  multivitamin 1 Tablet(s) Oral daily  pantoprazole   Suspension 40 milliGRAM(s) Oral daily  piperacillin/tazobactam IVPB. 2.25 Gram(s) IV Intermittent every 8 hours  silver sulfADIAZINE 1% Cream 1 Application(s) Topical daily  thiamine 100 milliGRAM(s) Oral daily    MEDICATIONS  (PRN):  acetaminophen    Suspension. 650 milliGRAM(s) Oral every 6 hours PRN Moderate Pain (4 - 6)  acetaminophen  Suppository 650 milliGRAM(s) Rectal every 6 hours PRN For Temp greater than 38 C (100.4 F)  dextrose Gel 1 Dose(s) Oral once PRN Blood Glucose LESS THAN 70 milliGRAM(s)/deciliter  glucagon  Injectable 1 milliGRAM(s) IntraMuscular once PRN Glucose LESS THAN 70 milligrams/deciliter PROGRESS NOTE    CC: Septic/cardiogenic shock, ATN now on HD, CVA  Overnight Events: ABdominal distension but residuals not checked. Feeds temporarily held and restarted this AM. Residuals later this AM at 20cc.  Interval History: MInimal responsive to verbal or painful stimuli. Intermittently can follow commands.  ROS: As above- unable to assess.    OBJECTIVE  Vitals:  T(C): 36.5 (05-02-18 @ 06:00), Max: 36.6 (05-01-18 @ 14:15)  HR: 87 (05-02-18 @ 06:41) (6 - 94)  BP: 106/74 (05-02-18 @ 05:06) (72/46 - 107/77)  RR: 17 (05-02-18 @ 05:06) (15 - 25)  SpO2: 100% (05-02-18 @ 06:41) (97% - 100%)  Wt(kg): --  Mode: AC/ CMV (Assist Control/ Continuous Mandatory Ventilation)  RR (machine): 10  TV (machine): 500  FiO2: 40  PEEP: 5  ITime: 1  MAP: 9.8  PIP: 20    I/O:  I&O's Summary    01 May 2018 07:01  -  02 May 2018 07:00  --------------------------------------------------------  IN: 1554 mL / OUT: 1800 mL / NET: -246 mL        PHYSICAL EXAM:  Appearance: NAD, no accessory muscle use. Lethargic and minimally responsive to painful or verbal stimuli.   HEENT:  Difficulty assessing. Minimal eye opening. Conjunctiva clear b/l. Moist oral mucosa.  Cardiovascular:RRR, S1, S2 with no murmur.   Respiratory: Lungs with diffuse rhonchi b/l. Trach in place currently on CPAP.   Gastrointestinal:  Soft, nontender- though difficulty assessing. Abdominal distension with tympany to percussion. Nonrigid. Decreased bowel sounds though present.  Extremities: 1+ edema b/l to mid thigh. No erythema b/l. LE WWP b/l.  Vascular: DP diminished.  Neurologic:  Minimal response to verbal/ painful stimuli- decreased eye opening - minimal with painful stimuli. Minimal command following- attempting to squeeze hand and open eyes. Noted twitching of mouth. Minimal movement of b/l upper and lower extremities R>L.  	  LABS:                        8.5    19.3  )-----------( 170      ( 01 May 2018 18:59 )             27.2     05-01    133<L>  |  91<L>  |  57<H>  ----------------------------<  242<H>  4.1   |  20<L>  |  3.93<H>    Ca    8.8      01 May 2018 05:58  Phos  6.6     05-01  Mg     1.9     05-01      PT/INR - ( 01 May 2018 05:58 )   PT: 16.9 sec;   INR: 1.51          PTT - ( 01 May 2018 05:58 )  PTT:71.6 sec      RADIOLOGY & ADDITIONAL TESTS:  Reviewed .    MEDICATIONS  (STANDING):  aspirin  chewable 81 milliGRAM(s) Oral daily  atorvastatin 80 milliGRAM(s) Oral at bedtime  chlorhexidine 0.12% Liquid 15 milliLiter(s) Swish and Spit two times a day  chlorhexidine 2% Cloths 1 Application(s) Topical daily  dextrose 5%. 1000 milliLiter(s) (50 mL/Hr) IV Continuous <Continuous>  dextrose 50% Injectable 12.5 Gram(s) IV Push once  dextrose 50% Injectable 25 Gram(s) IV Push once  dextrose 50% Injectable 25 Gram(s) IV Push once  ergocalciferol Drops 6000 Unit(s) Oral daily  escitalopram 5 milliGRAM(s) Oral daily  folic acid 1 milliGRAM(s) Oral daily  heparin  Infusion 1400 Unit(s)/Hr (14 mL/Hr) IV Continuous <Continuous>  hydrocortisone sodium succinate Injectable 50 milliGRAM(s) IV Push every 8 hours  insulin glargine Injectable (LANTUS) 20 Unit(s) SubCutaneous at bedtime  insulin regular  human corrective regimen sliding scale   SubCutaneous every 6 hours  insulin regular  human recombinant 5 Unit(s) SubCutaneous every 6 hours  levETIRAcetam  Solution 250 milliGRAM(s) Oral <User Schedule>  levETIRAcetam  Solution 500 milliGRAM(s) Oral every 24 hours  metoclopramide 5 milliGRAM(s) Oral every 6 hours  midodrine 15 milliGRAM(s) Oral every 8 hours  modafinil 100 milliGRAM(s) Oral <User Schedule>  multivitamin 1 Tablet(s) Oral daily  pantoprazole   Suspension 40 milliGRAM(s) Oral daily  piperacillin/tazobactam IVPB. 2.25 Gram(s) IV Intermittent every 8 hours  silver sulfADIAZINE 1% Cream 1 Application(s) Topical daily  thiamine 100 milliGRAM(s) Oral daily    MEDICATIONS  (PRN):  acetaminophen    Suspension. 650 milliGRAM(s) Oral every 6 hours PRN Moderate Pain (4 - 6)  acetaminophen  Suppository 650 milliGRAM(s) Rectal every 6 hours PRN For Temp greater than 38 C (100.4 F)  dextrose Gel 1 Dose(s) Oral once PRN Blood Glucose LESS THAN 70 milliGRAM(s)/deciliter  glucagon  Injectable 1 milliGRAM(s) IntraMuscular once PRN Glucose LESS THAN 70 milligrams/deciliter

## 2018-05-02 NOTE — PROGRESS NOTE ADULT - PROBLEM SELECTOR PLAN 5
Likely 2/2 phlebotomy and CKD, No bloody output. Prior studies showed Anemia of chronic disease.   - monitor CBC, transfuse if Hgb <7  -Keep active T&S Likely 2/2 phlebotomy and CKD, No bloody output. Prior studies showed Anemia of chronic disease. Hgb at 9.4 today.  - monitor CBC, transfuse if Hgb <7  -Keep active T&S

## 2018-05-02 NOTE — PROGRESS NOTE ADULT - PROBLEM SELECTOR PLAN 4
Patient's Calcium 9.0 and phosphorus 5.0 at present.  Will continue ergo for now.  Low k/Low phos/renal feeding  Monitor Calcium and phophorus level.

## 2018-05-02 NOTE — PROGRESS NOTE ADULT - PROBLEM SELECTOR PLAN 1
Oligoanuric GILMER due to ATN on RRT at present.   Last HD treatment on 5/1 with 1.8 L UF. Tolerated procedure well.   Will do HD treatment today for UF and clearances with increased UF as tolerated with 2L UF.   Low K/Low phos/renal feeding.  Continue midodrine through peg tube now.

## 2018-05-02 NOTE — PROGRESS NOTE ADULT - ATTENDING COMMENTS
Patient seen and examined with house-staff during bedside rounds.  Resident note read, including vitals, physical findings, laboratory data, and radiological reports.   Revisions included below.  Direct personal management at bed side and extensive interpretation of the data.  Plan was outlined and discussed in details with the housestaff.  Decision making of high complexity  Action taken for acute disease activity to reflect the level of care provided:  - medication reconciliation  - review laboratory data  - management of renal failure  - management of MV  - off albumin and midodirin

## 2018-05-02 NOTE — PROGRESS NOTE ADULT - PROBLEM SELECTOR PLAN 7
S/p tracheostomy 4/5. Previous bedside ultrasound notable for b/l pleural effusions and atelectatic lung-Likely related to fluid overload. If clinically worsening, can consider thoracentesis for fluid studies and cultures. Course further complicated by septic shock from aspiration PNA.   - c/w daily CPAP trials, weaning to trach collar as tolerated  - c/w HD to remove excess fluid  -c/w zosyn S/p tracheostomy 4/5. Previous bedside ultrasound notable for b/l pleural effusions and atelectatic lung-Likely related to fluid overload. If clinically worsening, can consider thoracentesis for fluid studies and cultures. Course further complicated by septic shock from aspiration PNA.   - c/w daily CPAP trials, weaning to trach collar as tolerated  - c/w HD to remove excess fluid  -c/w zosyn  -Pending dispo for LTach.

## 2018-05-02 NOTE — PROGRESS NOTE ADULT - PROBLEM SELECTOR PLAN 2
Course has been complicated by septic shock as well as component of cardiogenic shock. Patient required pressors and inotropes for which he has been weaned off. On midodrine 15 mg TID. Has also been on albumin with dialysis.   - c/w Midodrine 15 mg q8h to maintain SBP>65  - c/w fludrocortisone 0.1mg qd and prednisone 6 mg BID for oral regimen    #Adrenal Insufficiency  BP improved immediately after hydrocortisone and able to wean off levophed  - c/w fludrocortisone and prednisone for PO regimen Course has been complicated by septic shock as well as component of cardiogenic shock. Patient required pressors and inotropes for which he has been weaned off. On midodrine 15 mg TID. Has also been on albumin with dialysis but has been tolerated off albumin.  - c/w Midodrine 15 mg q8h to maintain SBP>65  -off solucortef, c/w monitoring pressures.    #Adrenal Insufficiency  BP improved immediately after hydrocortisone and able to wean off levophed  - c/w fludrocortisone and prednisone for PO regimen

## 2018-05-02 NOTE — ADVANCED PRACTICE NURSE CONSULT - RECOMMEDATIONS
Recommend moisture barrier ointment to stage 2 ulcer, continue to use Air Tap for repositioning. Spoke with MATT Jeter and house staff.

## 2018-05-02 NOTE — PROGRESS NOTE ADULT - ASSESSMENT
63M PMH HFrEF 10-15% (ischemic), MI, s/p AICD vs PPM, possible Afib, HTN, DM2 on insulin, possible CKD, and gout, who presented with a chief complaint of generalized weakness s/p septic shock likely 2/2 LE cellulitis complicated by ATN requiring dialysis. Course also c/b AMS likely from brainstem infarct 2/2 LV thrombus and/or toxic metabolic encephalopathy and found to have candidemia and sepsis 2/2 klebsiella bacteremia s/p fluconazole and CTX and de-lining. Pending dispo for L-TACH at new baseline mental status. Pt on hep bridge to coumadin for afib and LV thrombus. Currently ventilator dependent tolerating daily CPAP trials and with resolved septic shock from aspiration pneumonia. Patient undergoing management on 7LACH. 63M PMH HFrEF 10-15% (ischemic), MI, s/p AICD vs PPM, possible Afib, HTN, DM2 on insulin, possible CKD, and gout, who presented with a chief complaint of generalized weakness s/p septic shock likely 2/2 LE cellulitis complicated by ATN requiring dialysis. Course also c/b AMS likely from brainstem infarct 2/2 LV thrombus and/or toxic metabolic encephalopathy and found to have candidemia and sepsis 2/2 klebsiella bacteremia s/p fluconazole and CTX and de-lining. Pending dispo for L-TACH at new baseline mental status. Pt on hep bridge to coumadin for afib and LV thrombus. Currently ventilator dependent tolerating daily CPAP trials and with resolved septic shock from aspiration pneumonia. Patient undergoing management on 7LACH. Pending dispo for LTACH.

## 2018-05-03 LAB
ANION GAP SERPL CALC-SCNC: 20 MMOL/L — HIGH (ref 5–17)
APTT BLD: 75.5 SEC — HIGH (ref 27.5–37.4)
BUN SERPL-MCNC: 41 MG/DL — HIGH (ref 7–23)
CALCIUM SERPL-MCNC: 8.8 MG/DL — SIGNIFICANT CHANGE UP (ref 8.4–10.5)
CHLORIDE SERPL-SCNC: 95 MMOL/L — LOW (ref 96–108)
CO2 SERPL-SCNC: 21 MMOL/L — LOW (ref 22–31)
CREAT SERPL-MCNC: 2.84 MG/DL — HIGH (ref 0.5–1.3)
GLUCOSE BLDC GLUCOMTR-MCNC: 257 MG/DL — HIGH (ref 70–99)
GLUCOSE BLDC GLUCOMTR-MCNC: 269 MG/DL — HIGH (ref 70–99)
GLUCOSE BLDC GLUCOMTR-MCNC: 291 MG/DL — HIGH (ref 70–99)
GLUCOSE BLDC GLUCOMTR-MCNC: 301 MG/DL — HIGH (ref 70–99)
GLUCOSE BLDC GLUCOMTR-MCNC: 302 MG/DL — HIGH (ref 70–99)
GLUCOSE BLDC GLUCOMTR-MCNC: 312 MG/DL — HIGH (ref 70–99)
GLUCOSE SERPL-MCNC: 218 MG/DL — HIGH (ref 70–99)
HCT VFR BLD CALC: 30.3 % — LOW (ref 39–50)
HGB BLD-MCNC: 9.4 G/DL — LOW (ref 13–17)
INR BLD: 1.96 — HIGH (ref 0.88–1.16)
MAGNESIUM SERPL-MCNC: 2 MG/DL — SIGNIFICANT CHANGE UP (ref 1.6–2.6)
MCHC RBC-ENTMCNC: 29.1 PG — SIGNIFICANT CHANGE UP (ref 27–34)
MCHC RBC-ENTMCNC: 31 G/DL — LOW (ref 32–36)
MCV RBC AUTO: 93.8 FL — SIGNIFICANT CHANGE UP (ref 80–100)
PLATELET # BLD AUTO: 156 K/UL — SIGNIFICANT CHANGE UP (ref 150–400)
POTASSIUM SERPL-MCNC: 3.9 MMOL/L — SIGNIFICANT CHANGE UP (ref 3.5–5.3)
POTASSIUM SERPL-SCNC: 3.9 MMOL/L — SIGNIFICANT CHANGE UP (ref 3.5–5.3)
PROTHROM AB SERPL-ACNC: 22 SEC — HIGH (ref 9.8–12.7)
RBC # BLD: 3.23 M/UL — LOW (ref 4.2–5.8)
RBC # FLD: 16.9 % — SIGNIFICANT CHANGE UP (ref 10.3–16.9)
SODIUM SERPL-SCNC: 136 MMOL/L — SIGNIFICANT CHANGE UP (ref 135–145)
WBC # BLD: 19.3 K/UL — HIGH (ref 3.8–10.5)
WBC # FLD AUTO: 19.3 K/UL — HIGH (ref 3.8–10.5)

## 2018-05-03 PROCEDURE — 99232 SBSQ HOSP IP/OBS MODERATE 35: CPT | Mod: GC

## 2018-05-03 PROCEDURE — 74018 RADEX ABDOMEN 1 VIEW: CPT | Mod: 26

## 2018-05-03 PROCEDURE — 99233 SBSQ HOSP IP/OBS HIGH 50: CPT | Mod: GC

## 2018-05-03 PROCEDURE — 99233 SBSQ HOSP IP/OBS HIGH 50: CPT

## 2018-05-03 PROCEDURE — 71045 X-RAY EXAM CHEST 1 VIEW: CPT | Mod: 26

## 2018-05-03 RX ORDER — DOXERCALCIFEROL 2.5 UG/1
2 CAPSULE ORAL ONCE
Qty: 0 | Refills: 0 | Status: COMPLETED | OUTPATIENT
Start: 2018-05-03 | End: 2018-05-03

## 2018-05-03 RX ORDER — WARFARIN SODIUM 2.5 MG/1
5 TABLET ORAL ONCE
Qty: 0 | Refills: 0 | Status: COMPLETED | OUTPATIENT
Start: 2018-05-03 | End: 2018-05-03

## 2018-05-03 RX ORDER — ERYTHROPOIETIN 10000 [IU]/ML
8000 INJECTION, SOLUTION INTRAVENOUS; SUBCUTANEOUS ONCE
Qty: 0 | Refills: 0 | Status: COMPLETED | OUTPATIENT
Start: 2018-05-03 | End: 2018-05-03

## 2018-05-03 RX ORDER — INSULIN HUMAN 100 [IU]/ML
9 INJECTION, SOLUTION SUBCUTANEOUS EVERY 6 HOURS
Qty: 0 | Refills: 0 | Status: DISCONTINUED | OUTPATIENT
Start: 2018-05-03 | End: 2018-05-04

## 2018-05-03 RX ADMIN — INSULIN HUMAN 9 UNIT(S): 100 INJECTION, SOLUTION SUBCUTANEOUS at 18:19

## 2018-05-03 RX ADMIN — Medication 100 MILLIGRAM(S): at 12:04

## 2018-05-03 RX ADMIN — HEPARIN SODIUM 14 UNIT(S)/HR: 5000 INJECTION INTRAVENOUS; SUBCUTANEOUS at 12:12

## 2018-05-03 RX ADMIN — MIDODRINE HYDROCHLORIDE 15 MILLIGRAM(S): 2.5 TABLET ORAL at 22:03

## 2018-05-03 RX ADMIN — PIPERACILLIN AND TAZOBACTAM 200 GRAM(S): 4; .5 INJECTION, POWDER, LYOPHILIZED, FOR SOLUTION INTRAVENOUS at 13:59

## 2018-05-03 RX ADMIN — ERYTHROPOIETIN 8000 UNIT(S): 10000 INJECTION, SOLUTION INTRAVENOUS; SUBCUTANEOUS at 17:55

## 2018-05-03 RX ADMIN — INSULIN HUMAN 6: 100 INJECTION, SOLUTION SUBCUTANEOUS at 18:19

## 2018-05-03 RX ADMIN — INSULIN HUMAN 8: 100 INJECTION, SOLUTION SUBCUTANEOUS at 12:08

## 2018-05-03 RX ADMIN — INSULIN HUMAN 8: 100 INJECTION, SOLUTION SUBCUTANEOUS at 01:18

## 2018-05-03 RX ADMIN — ESCITALOPRAM OXALATE 5 MILLIGRAM(S): 10 TABLET, FILM COATED ORAL at 12:07

## 2018-05-03 RX ADMIN — INSULIN HUMAN 6 UNIT(S): 100 INJECTION, SOLUTION SUBCUTANEOUS at 12:08

## 2018-05-03 RX ADMIN — MODAFINIL 100 MILLIGRAM(S): 200 TABLET ORAL at 06:44

## 2018-05-03 RX ADMIN — Medication 5 MILLIGRAM(S): at 05:33

## 2018-05-03 RX ADMIN — ERGOCALCIFEROL 6000 UNIT(S): 1.25 CAPSULE ORAL at 12:07

## 2018-05-03 RX ADMIN — MIDODRINE HYDROCHLORIDE 15 MILLIGRAM(S): 2.5 TABLET ORAL at 14:00

## 2018-05-03 RX ADMIN — MODAFINIL 100 MILLIGRAM(S): 200 TABLET ORAL at 12:04

## 2018-05-03 RX ADMIN — MIDODRINE HYDROCHLORIDE 15 MILLIGRAM(S): 2.5 TABLET ORAL at 06:44

## 2018-05-03 RX ADMIN — Medication 50 MILLIGRAM(S): at 01:19

## 2018-05-03 RX ADMIN — INSULIN GLARGINE 20 UNIT(S): 100 INJECTION, SOLUTION SUBCUTANEOUS at 21:21

## 2018-05-03 RX ADMIN — INSULIN HUMAN 6: 100 INJECTION, SOLUTION SUBCUTANEOUS at 05:31

## 2018-05-03 RX ADMIN — CHLORHEXIDINE GLUCONATE 15 MILLILITER(S): 213 SOLUTION TOPICAL at 09:57

## 2018-05-03 RX ADMIN — Medication 5 MILLIGRAM(S): at 21:20

## 2018-05-03 RX ADMIN — Medication 81 MILLIGRAM(S): at 12:04

## 2018-05-03 RX ADMIN — PIPERACILLIN AND TAZOBACTAM 200 GRAM(S): 4; .5 INJECTION, POWDER, LYOPHILIZED, FOR SOLUTION INTRAVENOUS at 05:31

## 2018-05-03 RX ADMIN — INSULIN HUMAN 6 UNIT(S): 100 INJECTION, SOLUTION SUBCUTANEOUS at 01:18

## 2018-05-03 RX ADMIN — LEVETIRACETAM 500 MILLIGRAM(S): 250 TABLET, FILM COATED ORAL at 09:57

## 2018-05-03 RX ADMIN — WARFARIN SODIUM 5 MILLIGRAM(S): 2.5 TABLET ORAL at 21:21

## 2018-05-03 RX ADMIN — MIDODRINE HYDROCHLORIDE 15 MILLIGRAM(S): 2.5 TABLET ORAL at 01:19

## 2018-05-03 RX ADMIN — PANTOPRAZOLE SODIUM 40 MILLIGRAM(S): 20 TABLET, DELAYED RELEASE ORAL at 12:07

## 2018-05-03 RX ADMIN — Medication 50 MILLIGRAM(S): at 06:38

## 2018-05-03 RX ADMIN — CHLORHEXIDINE GLUCONATE 15 MILLILITER(S): 213 SOLUTION TOPICAL at 21:21

## 2018-05-03 RX ADMIN — Medication 5 MILLIGRAM(S): at 16:02

## 2018-05-03 RX ADMIN — CHLORHEXIDINE GLUCONATE 1 APPLICATION(S): 213 SOLUTION TOPICAL at 05:30

## 2018-05-03 RX ADMIN — Medication 5 MILLIGRAM(S): at 09:57

## 2018-05-03 RX ADMIN — Medication 1 TABLET(S): at 12:04

## 2018-05-03 RX ADMIN — DOXERCALCIFEROL 2 MICROGRAM(S): 2.5 CAPSULE ORAL at 17:55

## 2018-05-03 RX ADMIN — ATORVASTATIN CALCIUM 80 MILLIGRAM(S): 80 TABLET, FILM COATED ORAL at 21:20

## 2018-05-03 RX ADMIN — PIPERACILLIN AND TAZOBACTAM 200 GRAM(S): 4; .5 INJECTION, POWDER, LYOPHILIZED, FOR SOLUTION INTRAVENOUS at 21:22

## 2018-05-03 RX ADMIN — Medication 1 APPLICATION(S): at 12:10

## 2018-05-03 RX ADMIN — Medication 1 MILLIGRAM(S): at 12:04

## 2018-05-03 RX ADMIN — LEVETIRACETAM 250 MILLIGRAM(S): 250 TABLET, FILM COATED ORAL at 18:19

## 2018-05-03 RX ADMIN — INSULIN HUMAN 6 UNIT(S): 100 INJECTION, SOLUTION SUBCUTANEOUS at 05:31

## 2018-05-03 RX ADMIN — Medication 12 MILLIGRAM(S): at 16:02

## 2018-05-03 NOTE — PROGRESS NOTE ADULT - PROBLEM SELECTOR PLAN 4
Likely 2/2 to ischemic ATN in setting of sepsis with hypoperfusion (unknown baseline) presented with Cr 3.93 with metabolic derangements. Renal following, remains on HD. Permacath replaced multiple times over course for bacteremia and fungemia. Now with L subclavian HD cath.   - f/u renal recs- Patient will again get HD today as per renal.   -Remains off albumin.    #Adrenal Insufficiency- previously on fludrocortisone and prednisone. Started on stress dose steroids in setting of acute infection.  -weaning off steroids, switch to solucortef to prednisone.    #Elevated AG-has fluctuated between 20-25, Bicarb 20-22. Lactate negative, glucose has been well controlled, likely 2/2 uremia in setting of ESRD.   - Monitor BMP

## 2018-05-03 NOTE — PROGRESS NOTE ADULT - ATTENDING COMMENTS
Patient seen and examined with house-staff during bedside rounds.  Resident note read, including vitals, physical findings, laboratory data, and radiological reports.   Revisions included below.  Direct personal management at bed side and extensive interpretation of the data.  Plan was outlined and discussed in details with the housestaff.  Decision making of high complexity  Action taken for acute disease activity to reflect the level of care provided:  - medication reconciliation  - review laboratory data  - management of respiratory failure and MV  - trial pf PSV  - change to po prednisone  - on midodrine  - Insulin adjusted  - HD today  - 10 day of zosyn  -

## 2018-05-03 NOTE — PROGRESS NOTE ADULT - PROBLEM SELECTOR PLAN 6
DM2 on Januvia at home. HbA1C 8.6. Has been on Lantus 42U qHS, insulin 7 units q6h. Insulin was decreased to 21U for being NPO but noted to have complication of hypoglycemia.   -c/w MISS, Lantus 20 qHS and regular insulin increased to 6U yesterday, given increasing FS will further adjust insulin. DM2 on Januvia at home. HbA1C 8.6. Has been on Lantus 42U qHS, insulin 7 units q6h. Insulin was decreased to 21U for being NPO but noted to have complication of hypoglycemia.   -c/w MISS, Lantus 20 qHS and regular insulin increased to 9U today. Given increasing FS will further adjust insulin.

## 2018-05-03 NOTE — PROGRESS NOTE ADULT - PROBLEM SELECTOR PLAN 7
historically, pt has been full code. Will reach out to pt HCP to re-eval her thought and goals for pt.

## 2018-05-03 NOTE — PROGRESS NOTE ADULT - SUBJECTIVE AND OBJECTIVE BOX
Patient is a 63y Male seen and evaluated at bedside. Patient lying in bed in no acute disress at present remains on ventilatory support through tracheostomy. Last HD on 5/2 with UF 1.5 Liter tolerated procedure well.     acetaminophen    Suspension. 650 every 6 hours PRN  acetaminophen  Suppository 650 every 6 hours PRN  aspirin  chewable 81 daily  atorvastatin 80 at bedtime  chlorhexidine 0.12% Liquid 15 two times a day  chlorhexidine 2% Cloths 1 daily  dextrose 5%. 1000 <Continuous>  dextrose 50% Injectable 12.5 once  dextrose 50% Injectable 25 once  dextrose 50% Injectable 25 once  dextrose Gel 1 once PRN  doxercalciferol Injectable 2 once  epoetin amy Injectable 8000 once  ergocalciferol Drops 6000 daily  escitalopram 5 daily  folic acid 1 daily  glucagon  Injectable 1 once PRN  heparin  Infusion 1400 <Continuous>  hydrocortisone sodium succinate Injectable 50 every 8 hours  insulin glargine Injectable (LANTUS) 20 at bedtime  insulin regular  human corrective regimen sliding scale  every 6 hours  insulin regular  human recombinant 6 every 6 hours  levETIRAcetam  Solution 250 <User Schedule>  levETIRAcetam  Solution 500 every 24 hours  metoclopramide 5 every 6 hours  midodrine 15 every 8 hours  modafinil 100 <User Schedule>  multivitamin 1 daily  pantoprazole   Suspension 40 daily  piperacillin/tazobactam IVPB. 2.25 every 8 hours  silver sulfADIAZINE 1% Cream 1 daily  thiamine 100 daily      Allergies    No Known Allergies    Intolerances        T(C): , Max: 37.6 (05-03-18 @ 09:00)  T(F): , Max: 99.7 (05-03-18 @ 09:00)  HR: 104 (05-03-18 @ 08:18)  BP: 100/63 (05-03-18 @ 08:18)  BP(mean): 78 (05-03-18 @ 08:18)  RR: 18 (05-03-18 @ 08:18)  SpO2: 100% (05-03-18 @ 08:18)  Wt(kg): --    05-02 @ 07:01  -  05-03 @ 07:00  --------------------------------------------------------  IN: 2048 mL / OUT: 1500 mL / NET: 548 mL          Review of Systems:  Limited. Patient remains on ventilatory support     PHYSICAL EXAM:  GENERAL Ill appearing, lying in bed, confused and disoriented in no acute distress at present on ventilatory support   HEAD:  Atraumatic, Normocephalic,   EYES: Bilateral conjuctival and scleral pallor+nt  Oral cavity: Oral mucosa dry and pale  NECK: Neck supple, No JVP, Tracheostomy present.   CHEST/LUNG: Bilateral decreased breath sounds, Bibasilar rales and crepitatiosn+nt, no wheezing  HEART: Regular rate and rhythm. HOMER II/VI at LPSB, No gallop, no rub   ABDOMEN: Soft, Nontender, non distended, PEG tube present. BS+nt, No flank tenderness.   EXTREMITIES: Bilateral thigh and dependent abdominal/thigh edema present No clubbing, cyanosis  Neurology: AAOx1, confused and disoriented, Able to move extremities.   SKIN: No rashes or lesions        ACCESS: Right chest wall tunnel HD catheter present. No bleeding    LABS:                        9.4    19.3  )-----------( 156      ( 03 May 2018 07:52 )             30.3     05-03    136  |  95<L>  |  41<H>  ----------------------------<  218<H>  3.9   |  21<L>  |  2.84<H>    Ca    8.8      03 May 2018 07:52  Phos  5.0     05-02  Mg     2.0     05-03        PT/INR - ( 03 May 2018 07:52 )   PT: 22.0 sec;   INR: 1.96          PTT - ( 03 May 2018 07:52 )  PTT:75.5 sec          RADIOLOGY & ADDITIONAL STUDIES:

## 2018-05-03 NOTE — PROGRESS NOTE ADULT - PROBLEM SELECTOR PLAN 5
Likely 2/2 phlebotomy and CKD, No bloody output. Prior studies showed Anemia of chronic disease. Hgb at 9.4 today.  - monitor CBC, transfuse if Hgb <7  -Keep active T&S

## 2018-05-03 NOTE — PROGRESS NOTE ADULT - PROBLEM SELECTOR PLAN 3
Acute change in mental status likely 2/2 brainstem infarct with component of encephalopathy from infection. 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 Per neurology may have had ischemic event from hypoperfusion. Also showing disc protrusion at C3/C4 level coursing superiorly to the C2/C3 contacting the ventral spinal cord. Per neurology, this may be contributing to weakness, however would not explain change in mental status.  Exam limited- minimal responsiveness to verbal painful stimuli.   - c/w Modafinil  - No MRI at this time for further prognostication.  -Exam notable for some improvement in lethargy moving R side> left.     #Seizures- c/w keppra 500 mg qd with extra 250 mg post HD days

## 2018-05-03 NOTE — PROGRESS NOTE ADULT - PROBLEM SELECTOR PLAN 4
Patient's Calcium 8.8 and phosphorus 5.0 at present.  Will continue ergo for now.  Low k/Low phos/renal feeding  Hectoral during HD treatment.  Monitor Calcium and phophorus level.

## 2018-05-03 NOTE — PROGRESS NOTE ADULT - PROBLEM SELECTOR PLAN 2
Course has been complicated by septic shock as well as component of cardiogenic shock. Patient required pressors and inotropes for which he has been weaned off. On midodrine 15 mg TID. Previously on albumin with dialysis that has been weaned off and has remained off at this time.  - c/w Midodrine 15 mg q8h to maintain SBP>65  -switching solucortef to prednisone 12mg       #Adrenal Insufficiency  BP improved immediately after hydrocortisone and able to wean off levophed  - c/w weaning steroids, switch to prednisone today. Course has been complicated by septic shock as well as component of cardiogenic shock. Patient required pressors and inotropes for which he has been weaned off. On midodrine 15 mg TID. Previously on albumin with dialysis that has been weaned off and has remained off at this time.  - c/w Midodrine 15 mg q8h to maintain SBP>65  -switching solucortef to prednisone 12mg q8h    #Adrenal Insufficiency  BP improved immediately after hydrocortisone and able to wean off levophed  - c/w weaning steroids, switch to prednisone today to 12mg q8h

## 2018-05-03 NOTE — PROGRESS NOTE ADULT - SUBJECTIVE AND OBJECTIVE BOX
HUNTER BRIZUELA   MRN-[id number]     (1955):     HPI:  62 yo M history of HFrEF 10-15% 2/2 ischemic cardiomyopathy, MI , s/p AICD vs PPM, ?Afib, Hypertension, Diabetes Mellitus Type 2 on insulin, CKD?and gout, who presents with a chief complaint of generalized weakness. Pt wad admitted to [facility] months ago for gout and was sent to rehab. He was discharged home mid Feb with VNS. In the past 2 weeks patient has noted increased leg pain and weakness bilaterally. In the last few days, he has also experienced generalized weakness prompting him to come to ER.   In ER, noted to have t max of 102, SBP 70s, leukocytosis to 32.8, Lactate of 6.6, Acute renal failure with severe metabolic derangements. Given 3.5L fluids and Vanc/Zosyn. MICU consulted. (10 Mar 2018 18:51)    Pt known to palliative medicine earlier in this hospitalization when pt was initially in the MICU.  During that time, pt was critically ill  on life support (mech ventilation, CVVHD, pressors).  [employer] met with pt's HCP, (dgt Cristal) to discuss overall goals of care, as pt was not trached or PEG'd yet.   Pt had minimal mental status. Pt was able to open eyes, follow simple commands intermittently.  Pt's dgt agreed to trach and vent as well as PEG to allow pt time to make an improvement as pt has suffered a CVA and his neurological prognosis was unclear. Palliative medicine signed off on .  The goal at that time was to allow pt to stabilize with a plan to transition to LTAC, when medically appropriate.  Pt's dgt was going to reassess pt's overall condition after some time...  Pt has remained in the hospital since .  He has been in (mostly) and out of ICU.  He remains vented thru his trach, he is on HD with high dose midodrine He currently does not require albumin to tolerate his HD sessions.  Pts mental status remains poor.    Palliative medicine will reconsult to discuss with pts dgt plan of care as pt has [place] for months.     Interval History:  see above    ROS:   nonverbal   Unable to attain ROS due to:  poor mental status                    Dyspnea (Heriberto 0-10):                       N/V (Y/N):                            Secretions (Y/N):              Agitation(Y/N):  Pain (Y/N):       -Provocation/Palliation:  -Quality/Quantity:  -Radiating:  -Severity:  -Timing/Frequency:  -Impact on ADLs:    Allergies:  No Known Allergies    Intolerances    Opiate Naive (Y/N):   yes  -iStop reviewed (Y/N):  yes ref # [id number]      MEDICATIONS  (STANDING):  aspirin  chewable 81 milliGRAM(s) Oral daily  atorvastatin 80 milliGRAM(s) Oral at bedtime  chlorhexidine 0.12% Liquid 15 milliLiter(s) Swish and Spit two times a day  chlorhexidine 2% Cloths 1 Application(s) Topical daily  dextrose 5%. 1000 milliLiter(s) (50 mL/Hr) IV Continuous <Continuous>  dextrose 50% Injectable 12.5 Gram(s) IV Push once  dextrose 50% Injectable 25 Gram(s) IV Push once  dextrose 50% Injectable 25 Gram(s) IV Push once  doxercalciferol Injectable 2 MICROGram(s) IV Push once  epoetin amy Injectable 8000 Unit(s) IV Push once  ergocalciferol Drops 6000 Unit(s) Oral daily  escitalopram 5 milliGRAM(s) Oral daily  folic acid 1 milliGRAM(s) Oral daily  heparin  Infusion 1400 Unit(s)/Hr (14 mL/Hr) IV Continuous <Continuous>  insulin glargine Injectable (LANTUS) 20 Unit(s) SubCutaneous at bedtime  insulin regular  human corrective regimen sliding scale   SubCutaneous every 6 hours  insulin regular  human recombinant 6 Unit(s) SubCutaneous every 6 hours  levETIRAcetam  Solution 250 milliGRAM(s) Oral <User Schedule>  levETIRAcetam  Solution 500 milliGRAM(s) Oral every 24 hours  metoclopramide 5 milliGRAM(s) Oral every 6 hours  midodrine 15 milliGRAM(s) Oral every 8 hours  modafinil 100 milliGRAM(s) Oral <User Schedule>  multivitamin 1 Tablet(s) Oral daily  pantoprazole   Suspension 40 milliGRAM(s) Oral daily  piperacillin/tazobactam IVPB. 2.25 Gram(s) IV Intermittent every 8 hours  predniSONE   Tablet 12 milliGRAM(s) Oral every 8 hours  silver sulfADIAZINE 1% Cream 1 Application(s) Topical daily  thiamine 100 milliGRAM(s) Oral daily    MEDICATIONS  (PRN):  acetaminophen    Suspension. 650 milliGRAM(s) Oral every 6 hours PRN Moderate Pain (4 - 6)  acetaminophen  Suppository 650 milliGRAM(s) Rectal every 6 hours PRN For Temp greater than 38 C (100.4 F)  dextrose Gel 1 Dose(s) Oral once PRN Blood Glucose LESS THAN 70 milliGRAM(s)/deciliter  glucagon  Injectable 1 milliGRAM(s) IntraMuscular once PRN Glucose LESS THAN 70 milligrams/decilite      Labs:                                                 9.4    19.3  )-----------( 156      ( 03 May 2018 07:52 )             30.3       05-03    136  |  95<L>  |  41<H>  ----------------------------<  218<H>  3.9   |  21<L>  |  2.84<H>    Ca    8.8      03 May 2018 07:52  Phos  5.0     05-02  Mg     2.0     05-03        IMAGING:           reviewed    PEx:  Vital Signs Last 24 Hrs  T(C): 36.7 (03 May 2018 13:43), Max: 37.6 (03 May 2018 09:00)  T(F): 98 (03 May 2018 13:43), Max: 99.7 (03 May 2018 09:00)  HR: 80 (03 May 2018 12:00) (80 - 112)  BP: 88/61 (03 May 2018 12:00) (88/61 - 100/63)  BP(mean): 70 (03 May 2018 12:00) (70 - 82)  RR: 16 (03 May 2018 12:00) (13 - 19)  SpO2: 100% (03 May 2018 12:00) (100% - 100%)      General:  ill appearing, thin, not distressed   HEENT:  nc/at, thin,  temporal wasting, moist oral cavity, eyes remain closed  Neck:   supple, neg lymphadenopathy,  L IJ TLC, trach in place-- attached to vent  CVS:  SR,  rate controlled, pacer noted to  L chest wall, + R HD catheter, distant heart tones  Resp:  non-labored, no rhonchi  GI:   nondistended, soft,  +PEG tube in place  :   + scrotal edema, anuric  Musc:   weak, thin, no spontaneous movement  Ext: slight peripheral edema  Neuro:   not following commands, weak withdrawal from pain to R upper ext  Psych: maritza  Skin:  thin, dry, cool, + generalized edema although improving, pitting edema to the hips bilaterally  Lymph:  neg  Preadmit Karnofsky:   50 %           Current Karnofsky:   30    %  Cachexia (Y/N):   yes  BMI:  20.6  ( In March, pts BMI was 22)    Advanced Directives:     Full Code     HCP noted on chart     DPOA  (for finances)       Decision maker:   pt was deemed not to have decision making capacity on  by psychiatry  Legal surrogate:  pts dgt Cristal Castro [phone] is pts HCP    Social History:   single, lives by self, pt has a HHA 4hrs/day, 3 days per week.  Pt has 2 adult children (Cristal aged 37) and pts son, is 24 and lives in [place]. 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    He is retired but reports he is not on disability.   Pt is "spiritual" and practices a "Religion" raquel background   Per notes, pt has not been compliant with his medication (not picked up prescription from his outpt pharmacy) since 2017.    GOALS OF CARE DISCUSSION:       Palliative care info/counseling provided-- following	           Family meeting-  today, see below for details       Advanced Directives addressed please see Advance Care Planning Note-- 	           Documentation of GOC:  to allow more time for a neurologic recovery          REFERRALS:	        Palliative Med        Unit SW/Case Mgmt       - referred       Massage Therapy-- referred       Music Therapy-- referred       Nutrition-- following

## 2018-05-03 NOTE — PROGRESS NOTE ADULT - PROBLEM SELECTOR PLAN 7
S/p tracheostomy 4/5. Previous bedside ultrasound notable for b/l pleural effusions and atelectatic lung-Likely related to fluid overload. If clinically worsening, can consider thoracentesis for fluid studies and cultures. Course further complicated by septic shock from aspiration PNA.   - c/w daily CPAP trials, weaning to trach collar as tolerated  - c/w HD to remove excess fluid  -c/w zosyn (Day 6/10 day course).   -Pending dispo for LTach.

## 2018-05-03 NOTE — PROGRESS NOTE ADULT - PROBLEM SELECTOR PLAN 9
Hx of Afib and LV thrombus on coumadin prior to admission. Most recent TTE with [facility] shows no LV thrombus currently.   - c/w holding Metoprolol 12.5 q12h given hypotension and HR as been controlled in 70-90s. Will use Dig for rate control if RVR- goal <110  -c/w hep gtt and continue with bridging to coumadin.       #LV thrombus  LV thrombus noted on RUKHSANA on 4/10 for which patient has been on hep gtt. C/w bridge.  -c/w Coumadin, dose for tonight. INR at 1.9 this AM.

## 2018-05-03 NOTE — PROGRESS NOTE ADULT - PROBLEM SELECTOR PLAN 10
VTE: Hep gtt, Coumadin  GI PPx: PPI    FULL CODE    F: No IVF in setting of HD.  E: Replete electrolytes with caution in setting of HD.   N:  Restarted on tube feeds with Vital 1.5 Terrell at 63cc/hr    Dispo: Initially admitted to MICU but now on 7LACH for further management of hypotension in setting of shock, acute encephalopathy, bacteremia and acute on chronic respiratory failure. Course complicated by Septic shock 2/2 to aspiration PNA for which patient now off levophed. Now on 7LACH for further management of aspiraton PNA, chronic respiratory failure, hypotension and glucose control. Dispo Pending L-tACH with clinical stabilization.

## 2018-05-03 NOTE — PROGRESS NOTE ADULT - SUBJECTIVE AND OBJECTIVE BOX
PROGRESS NOTE    CC: Septic/cardiogenic shock, ATN now on HD, CVA  Overnight Events: Loose stools.  Interval History: More awake and alert and some responsiveness intermittently following commands but difficulty assessing questions.   ROS: As above- difficult to assess.    OBJECTIVE  Vitals:  T(C): 36.4 (05-03-18 @ 05:00), Max: 37.4 (05-02-18 @ 10:01)  HR: 97 (05-03-18 @ 05:53) (84 - 112)  BP: 93/69 (05-03-18 @ 04:40) (91/63 - 98/73)  RR: 16 (05-03-18 @ 05:53) (13 - 20)  SpO2: 100% (05-03-18 @ 05:53) (99% - 100%)  Wt(kg): --  Mode: AC/ CMV (Assist Control/ Continuous Mandatory Ventilation)  RR (machine): 10  TV (machine): 500  FiO2: 40  PEEP: 5  ITime: 1  MAP: 9  PIP: 20    I/O:  I&O's Summary    02 May 2018 07:01  -  03 May 2018 07:00  --------------------------------------------------------  IN: 2048 mL / OUT: 1500 mL / NET: 548 mL        PHYSICAL EXAM:  Appearance: NAD. Speaking in full sentences.   HEENT:   Conjunctiva clear b/l. Moist oral mucosa.  Cardiovascular: Irregularly irregular, rate controlled. S1, S2 with no murmurs.  Respiratory: Lungs rhonchi b/l.  Gastrointestinal:  Soft, nontender. improving abdominal distension. nonrigid. PEG in place. 	  Extremities: 1+ edema b/l to mid ankles. No erythema b/l. LE WWP b/l.  Vascular: DP diminished.  Neurologic: More awake and alert. Making eye contact. Intermittently following commands, squeezing with R hand and moving R upper and moving b/l lower extremity not on command but withdrawal to pain.   	  LABS:                        9.4    14.7  )-----------( 205      ( 02 May 2018 06:59 )             30.9     05-02    133<L>  |  91<L>  |  52<H>  ----------------------------<  121<H>  4.3   |  24  |  3.29<H>    Ca    9.0      02 May 2018 06:59  Phos  5.0     05-02  Mg     1.9     05-02      PT/INR - ( 02 May 2018 06:59 )   PT: 18.2 sec;   INR: 1.62          PTT - ( 02 May 2018 06:59 )  PTT:78.0 sec      RADIOLOGY & ADDITIONAL TESTS:  Reviewed .    MEDICATIONS  (STANDING):  aspirin  chewable 81 milliGRAM(s) Oral daily  atorvastatin 80 milliGRAM(s) Oral at bedtime  chlorhexidine 0.12% Liquid 15 milliLiter(s) Swish and Spit two times a day  chlorhexidine 2% Cloths 1 Application(s) Topical daily  dextrose 5%. 1000 milliLiter(s) (50 mL/Hr) IV Continuous <Continuous>  dextrose 50% Injectable 12.5 Gram(s) IV Push once  dextrose 50% Injectable 25 Gram(s) IV Push once  dextrose 50% Injectable 25 Gram(s) IV Push once  ergocalciferol Drops 6000 Unit(s) Oral daily  escitalopram 5 milliGRAM(s) Oral daily  folic acid 1 milliGRAM(s) Oral daily  heparin  Infusion 1400 Unit(s)/Hr (14 mL/Hr) IV Continuous <Continuous>  hydrocortisone sodium succinate Injectable 50 milliGRAM(s) IV Push every 8 hours  insulin glargine Injectable (LANTUS) 20 Unit(s) SubCutaneous at bedtime  insulin regular  human corrective regimen sliding scale   SubCutaneous every 6 hours  insulin regular  human recombinant 6 Unit(s) SubCutaneous every 6 hours  levETIRAcetam  Solution 250 milliGRAM(s) Oral <User Schedule>  levETIRAcetam  Solution 500 milliGRAM(s) Oral every 24 hours  metoclopramide 5 milliGRAM(s) Oral every 6 hours  midodrine 15 milliGRAM(s) Oral every 8 hours  modafinil 100 milliGRAM(s) Oral <User Schedule>  multivitamin 1 Tablet(s) Oral daily  pantoprazole   Suspension 40 milliGRAM(s) Oral daily  piperacillin/tazobactam IVPB. 2.25 Gram(s) IV Intermittent every 8 hours  silver sulfADIAZINE 1% Cream 1 Application(s) Topical daily  thiamine 100 milliGRAM(s) Oral daily    MEDICATIONS  (PRN):  acetaminophen    Suspension. 650 milliGRAM(s) Oral every 6 hours PRN Moderate Pain (4 - 6)  acetaminophen  Suppository 650 milliGRAM(s) Rectal every 6 hours PRN For Temp greater than 38 C (100.4 F)  dextrose Gel 1 Dose(s) Oral once PRN Blood Glucose LESS THAN 70 milliGRAM(s)/deciliter  glucagon  Injectable 1 milliGRAM(s) IntraMuscular once PRN Glucose LESS THAN 70 milligrams/deciliter PROGRESS NOTE    CC: Septic/cardiogenic shock, ATN now on HD, CVA  Overnight Events: Loose stools.  Interval History: More awake and alert and some responsiveness intermittently following commands but difficulty assessing questions.   ROS: As above- difficult to assess.    OBJECTIVE  Vitals:  T(C): 36.4 (05-03-18 @ 05:00), Max: 37.4 (05-02-18 @ 10:01)  HR: 97 (05-03-18 @ 05:53) (84 - 112)  BP: 93/69 (05-03-18 @ 04:40) (91/63 - 98/73)  RR: 16 (05-03-18 @ 05:53) (13 - 20)  SpO2: 100% (05-03-18 @ 05:53) (99% - 100%)  Wt(kg): --  Mode: AC/ CMV (Assist Control/ Continuous Mandatory Ventilation)  RR (machine): 10  TV (machine): 500  FiO2: 40  PEEP: 5  ITime: 1  MAP: 9  PIP: 20    I/O:  I&O's Summary    02 May 2018 07:01  -  03 May 2018 07:00  --------------------------------------------------------  IN: 2048 mL / OUT: 1500 mL / NET: 548 mL        PHYSICAL EXAM:  Appearance: NAD. Speaking in full sentences. Trach with vent on A/C.   HEENT:   Conjunctiva clear b/l. Moist oral mucosa.  Cardiovascular: Irregularly irregular, rate controlled. S1, S2 with no murmurs.  Respiratory: Lungs rhonchi b/l.  Gastrointestinal:  Soft, nontender. improving abdominal distension. nonrigid. PEG in place. 	  Extremities: 1+ edema b/l to mid ankles. No erythema b/l. LE WWP b/l.  Vascular: DP diminished.  Neurologic: More awake and alert. Making eye contact. Intermittently following commands, squeezing with R hand and moving R upper and moving b/l lower extremity not on command but withdrawal to pain.   	  LABS:                        9.4    14.7  )-----------( 205      ( 02 May 2018 06:59 )             30.9     05-02    133<L>  |  91<L>  |  52<H>  ----------------------------<  121<H>  4.3   |  24  |  3.29<H>    Ca    9.0      02 May 2018 06:59  Phos  5.0     05-02  Mg     1.9     05-02      PT/INR - ( 02 May 2018 06:59 )   PT: 18.2 sec;   INR: 1.62          PTT - ( 02 May 2018 06:59 )  PTT:78.0 sec      RADIOLOGY & ADDITIONAL TESTS:  Reviewed .    MEDICATIONS  (STANDING):  aspirin  chewable 81 milliGRAM(s) Oral daily  atorvastatin 80 milliGRAM(s) Oral at bedtime  chlorhexidine 0.12% Liquid 15 milliLiter(s) Swish and Spit two times a day  chlorhexidine 2% Cloths 1 Application(s) Topical daily  dextrose 5%. 1000 milliLiter(s) (50 mL/Hr) IV Continuous <Continuous>  dextrose 50% Injectable 12.5 Gram(s) IV Push once  dextrose 50% Injectable 25 Gram(s) IV Push once  dextrose 50% Injectable 25 Gram(s) IV Push once  ergocalciferol Drops 6000 Unit(s) Oral daily  escitalopram 5 milliGRAM(s) Oral daily  folic acid 1 milliGRAM(s) Oral daily  heparin  Infusion 1400 Unit(s)/Hr (14 mL/Hr) IV Continuous <Continuous>  hydrocortisone sodium succinate Injectable 50 milliGRAM(s) IV Push every 8 hours  insulin glargine Injectable (LANTUS) 20 Unit(s) SubCutaneous at bedtime  insulin regular  human corrective regimen sliding scale   SubCutaneous every 6 hours  insulin regular  human recombinant 6 Unit(s) SubCutaneous every 6 hours  levETIRAcetam  Solution 250 milliGRAM(s) Oral <User Schedule>  levETIRAcetam  Solution 500 milliGRAM(s) Oral every 24 hours  metoclopramide 5 milliGRAM(s) Oral every 6 hours  midodrine 15 milliGRAM(s) Oral every 8 hours  modafinil 100 milliGRAM(s) Oral <User Schedule>  multivitamin 1 Tablet(s) Oral daily  pantoprazole   Suspension 40 milliGRAM(s) Oral daily  piperacillin/tazobactam IVPB. 2.25 Gram(s) IV Intermittent every 8 hours  silver sulfADIAZINE 1% Cream 1 Application(s) Topical daily  thiamine 100 milliGRAM(s) Oral daily    MEDICATIONS  (PRN):  acetaminophen    Suspension. 650 milliGRAM(s) Oral every 6 hours PRN Moderate Pain (4 - 6)  acetaminophen  Suppository 650 milliGRAM(s) Rectal every 6 hours PRN For Temp greater than 38 C (100.4 F)  dextrose Gel 1 Dose(s) Oral once PRN Blood Glucose LESS THAN 70 milliGRAM(s)/deciliter  glucagon  Injectable 1 milliGRAM(s) IntraMuscular once PRN Glucose LESS THAN 70 milligrams/deciliter

## 2018-05-03 NOTE — PROGRESS NOTE ADULT - SUBJECTIVE AND OBJECTIVE BOX
Patient was seen and evaluated on dialysis.   Patient is not tolerating the procedure well.   Called by HD nurse for hemodynamic instability  Even with stopping ultrafiltration and only performing clearance, the BP reached a sebastien of 60/40  HR: 76 (05-03-18 @ 18:28)  BP: 60/40 (05-03-18 @ 18:28)    Decision made to discontinue HD at about 25 minutes into initiation.

## 2018-05-03 NOTE — PROGRESS NOTE ADULT - PROBLEM SELECTOR PLAN 1
Oligoanuric GILMER due to ATN on RRT at present.   Last HD treatment on 5/2 with 1.5 L UF. Tolerated procedure well.   Will schedule HD treatment today for UF and clearances UF as tolerated.  Low K/Low phos/renal feeding.  Continue midodrine through peg tube now.

## 2018-05-03 NOTE — PROGRESS NOTE ADULT - ASSESSMENT
63M PMH HFrEF 10-15% (ischemic), MI, s/p AICD vs PPM, possible Afib, HTN, DM2 on insulin, possible CKD, and gout, who presented with a chief complaint of generalized weakness s/p septic shock likely 2/2 LE cellulitis complicated by ATN requiring dialysis. Course complicated by AMS likely from brainstem infarct 2/2 LV thrombus and/or toxic metabolic encephalopathy and found to have candidemia and sepsis 2/2 klebsiella bacteremia s/p fluconazole and CTX. Course further complicated by aspiration PNA and currently on Zosyn for plan of 10 days.  Pt on hep bridge to coumadin for afib and LV thrombus. Currently ventilator dependent tolerating daily CPAP trials and  Patient undergoing management on 7LACH. Pending dispo for LTACH. 63M PMH HFrEF 10-15% (ischemic), MI, s/p AICD vs PPM, possible Afib, HTN, DM2 on insulin, possible CKD, and gout, who presented with a chief complaint of generalized weakness s/p septic shock likely 2/2 LE cellulitis complicated by ATN requiring dialysis. Course complicated by AMS likely from brainstem infarct 2/2 LV thrombus and/or toxic metabolic encephalopathy and found to have candidemia and sepsis 2/2 klebsiella bacteremia s/p fluconazole and CTX. Course further complicated by aspiration PNA and currently on Zosyn for plan of 10 days.  Pt on hep bridge to coumadin for afib and LV thrombus. Currently ventilator dependent tolerating daily CPAP trials. Noted elevated WBC this AM with continued loose

## 2018-05-03 NOTE — PROGRESS NOTE ADULT - ASSESSMENT
64 yo M history of HFrEF 10-15% 2/2 ischemic cardiomyopathy, MI , s/p AICD vs PPM, ?Afib, Hypertension, Diabetes Mellitus Type 2 on insulin, CKD and gout, who presents with a chief complaint of generalized weakness admitted for severe sepsis 2/2 LE cellulitis vs pneumonia   Pt has developed renal failure (GILMER) and was started on CVVHD, converted to HD but is hypotensive with same.    Pt has a neurological event on Friday March 23.  He was intubated for airway protection.  His mental status remains poor, off sedation.  No seizure.  No CVA.

## 2018-05-03 NOTE — PROGRESS NOTE ADULT - PROBLEM SELECTOR PLAN 1
Course complicated by septic shock 2/2 to aspiration with increasing O2 requirements for which patient was back in MICU. Tx with Zosyn. Resolving septic shock-requiring levophed. No longer on pressors. Pending wean from Solucortef.   -c/w Zosyn (today is day 6, plan for 10 day course).   -Noted uptrending in WBC from 14 to 19.     #Bacteremia  Noted to have Klebsiella bacteremia with associated Klebsiella in urine and sputum for which completed ceftriaxone course (4/15-4/26). Resolved.    #Fungemia  Noted to have Candida Tropicalis growing in blood cultures and completed fluconazole course. Resolved. Course complicated by septic shock 2/2 to aspiration with increasing O2 requirements for which patient was back in MICU. Tx with Zosyn. Resolving septic shock-requiring levophed. No longer on pressors. Pending wean from Solucortef.   -c/w Zosyn (today is day 6, plan for 10 day course).   -Noted uptrending in WBC from 14 to 19. Noted to have some loose stools- if continues to have loose stools will send off cultures.     #Bacteremia  Noted to have Klebsiella bacteremia with associated Klebsiella in urine and sputum for which completed ceftriaxone course (4/15-4/26). Resolved.    #Fungemia  Noted to have Candida Tropicalis growing in blood cultures and completed fluconazole course. Resolved.

## 2018-05-04 LAB
ANION GAP SERPL CALC-SCNC: 18 MMOL/L — HIGH (ref 5–17)
APTT BLD: 77.3 SEC — HIGH (ref 27.5–37.4)
BUN SERPL-MCNC: 48 MG/DL — HIGH (ref 7–23)
CALCIUM SERPL-MCNC: 8.4 MG/DL — SIGNIFICANT CHANGE UP (ref 8.4–10.5)
CHLORIDE SERPL-SCNC: 94 MMOL/L — LOW (ref 96–108)
CO2 SERPL-SCNC: 22 MMOL/L — SIGNIFICANT CHANGE UP (ref 22–31)
CREAT SERPL-MCNC: 3.02 MG/DL — HIGH (ref 0.5–1.3)
GLUCOSE BLDC GLUCOMTR-MCNC: 299 MG/DL — HIGH (ref 70–99)
GLUCOSE BLDC GLUCOMTR-MCNC: 335 MG/DL — HIGH (ref 70–99)
GLUCOSE BLDC GLUCOMTR-MCNC: 339 MG/DL — HIGH (ref 70–99)
GLUCOSE BLDC GLUCOMTR-MCNC: 340 MG/DL — HIGH (ref 70–99)
GLUCOSE BLDC GLUCOMTR-MCNC: 348 MG/DL — HIGH (ref 70–99)
GLUCOSE BLDC GLUCOMTR-MCNC: 362 MG/DL — HIGH (ref 70–99)
GLUCOSE SERPL-MCNC: 320 MG/DL — HIGH (ref 70–99)
HCT VFR BLD CALC: 29.1 % — LOW (ref 39–50)
HGB BLD-MCNC: 9.1 G/DL — LOW (ref 13–17)
INR BLD: 2.33 — HIGH (ref 0.88–1.16)
MAGNESIUM SERPL-MCNC: 1.9 MG/DL — SIGNIFICANT CHANGE UP (ref 1.6–2.6)
MCHC RBC-ENTMCNC: 29.4 PG — SIGNIFICANT CHANGE UP (ref 27–34)
MCHC RBC-ENTMCNC: 31.3 G/DL — LOW (ref 32–36)
MCV RBC AUTO: 93.9 FL — SIGNIFICANT CHANGE UP (ref 80–100)
PLATELET # BLD AUTO: 100 K/UL — LOW (ref 150–400)
POTASSIUM SERPL-MCNC: 4 MMOL/L — SIGNIFICANT CHANGE UP (ref 3.5–5.3)
POTASSIUM SERPL-SCNC: 4 MMOL/L — SIGNIFICANT CHANGE UP (ref 3.5–5.3)
PROTHROM AB SERPL-ACNC: 26.3 SEC — HIGH (ref 9.8–12.7)
RBC # BLD: 3.1 M/UL — LOW (ref 4.2–5.8)
RBC # FLD: 16.8 % — SIGNIFICANT CHANGE UP (ref 10.3–16.9)
SODIUM SERPL-SCNC: 134 MMOL/L — LOW (ref 135–145)
WBC # BLD: 15.9 K/UL — HIGH (ref 3.8–10.5)
WBC # FLD AUTO: 15.9 K/UL — HIGH (ref 3.8–10.5)

## 2018-05-04 PROCEDURE — 99233 SBSQ HOSP IP/OBS HIGH 50: CPT

## 2018-05-04 PROCEDURE — 71045 X-RAY EXAM CHEST 1 VIEW: CPT | Mod: 26

## 2018-05-04 PROCEDURE — 99233 SBSQ HOSP IP/OBS HIGH 50: CPT | Mod: GC

## 2018-05-04 PROCEDURE — 99497 ADVNCD CARE PLAN 30 MIN: CPT | Mod: 25

## 2018-05-04 PROCEDURE — 99231 SBSQ HOSP IP/OBS SF/LOW 25: CPT | Mod: GC

## 2018-05-04 RX ORDER — MODAFINIL 200 MG/1
100 TABLET ORAL
Qty: 0 | Refills: 0 | Status: DISCONTINUED | OUTPATIENT
Start: 2018-05-05 | End: 2018-05-11

## 2018-05-04 RX ORDER — WARFARIN SODIUM 2.5 MG/1
5 TABLET ORAL ONCE
Qty: 0 | Refills: 0 | Status: COMPLETED | OUTPATIENT
Start: 2018-05-04 | End: 2018-05-04

## 2018-05-04 RX ORDER — INSULIN HUMAN 100 [IU]/ML
12 INJECTION, SOLUTION SUBCUTANEOUS EVERY 6 HOURS
Qty: 0 | Refills: 0 | Status: DISCONTINUED | OUTPATIENT
Start: 2018-05-04 | End: 2018-05-05

## 2018-05-04 RX ORDER — INSULIN GLARGINE 100 [IU]/ML
30 INJECTION, SOLUTION SUBCUTANEOUS AT BEDTIME
Qty: 0 | Refills: 0 | Status: DISCONTINUED | OUTPATIENT
Start: 2018-05-04 | End: 2018-05-05

## 2018-05-04 RX ADMIN — INSULIN HUMAN 9 UNIT(S): 100 INJECTION, SOLUTION SUBCUTANEOUS at 12:11

## 2018-05-04 RX ADMIN — PANTOPRAZOLE SODIUM 40 MILLIGRAM(S): 20 TABLET, DELAYED RELEASE ORAL at 12:12

## 2018-05-04 RX ADMIN — CHLORHEXIDINE GLUCONATE 1 APPLICATION(S): 213 SOLUTION TOPICAL at 05:51

## 2018-05-04 RX ADMIN — INSULIN GLARGINE 30 UNIT(S): 100 INJECTION, SOLUTION SUBCUTANEOUS at 23:25

## 2018-05-04 RX ADMIN — Medication 5 MILLIGRAM(S): at 22:09

## 2018-05-04 RX ADMIN — Medication 1 APPLICATION(S): at 12:13

## 2018-05-04 RX ADMIN — Medication 12 MILLIGRAM(S): at 16:48

## 2018-05-04 RX ADMIN — INSULIN HUMAN 6: 100 INJECTION, SOLUTION SUBCUTANEOUS at 00:25

## 2018-05-04 RX ADMIN — LEVETIRACETAM 500 MILLIGRAM(S): 250 TABLET, FILM COATED ORAL at 10:15

## 2018-05-04 RX ADMIN — INSULIN HUMAN 8: 100 INJECTION, SOLUTION SUBCUTANEOUS at 05:56

## 2018-05-04 RX ADMIN — MODAFINIL 100 MILLIGRAM(S): 200 TABLET ORAL at 06:04

## 2018-05-04 RX ADMIN — INSULIN HUMAN 8: 100 INJECTION, SOLUTION SUBCUTANEOUS at 19:16

## 2018-05-04 RX ADMIN — Medication 12 MILLIGRAM(S): at 07:05

## 2018-05-04 RX ADMIN — ERGOCALCIFEROL 6000 UNIT(S): 1.25 CAPSULE ORAL at 12:12

## 2018-05-04 RX ADMIN — Medication 100 MILLIGRAM(S): at 12:12

## 2018-05-04 RX ADMIN — INSULIN HUMAN 10: 100 INJECTION, SOLUTION SUBCUTANEOUS at 12:11

## 2018-05-04 RX ADMIN — PIPERACILLIN AND TAZOBACTAM 200 GRAM(S): 4; .5 INJECTION, POWDER, LYOPHILIZED, FOR SOLUTION INTRAVENOUS at 22:28

## 2018-05-04 RX ADMIN — Medication 5 MILLIGRAM(S): at 10:15

## 2018-05-04 RX ADMIN — Medication 81 MILLIGRAM(S): at 12:12

## 2018-05-04 RX ADMIN — Medication 5 MILLIGRAM(S): at 05:52

## 2018-05-04 RX ADMIN — MIDODRINE HYDROCHLORIDE 15 MILLIGRAM(S): 2.5 TABLET ORAL at 06:04

## 2018-05-04 RX ADMIN — ESCITALOPRAM OXALATE 5 MILLIGRAM(S): 10 TABLET, FILM COATED ORAL at 12:12

## 2018-05-04 RX ADMIN — PIPERACILLIN AND TAZOBACTAM 200 GRAM(S): 4; .5 INJECTION, POWDER, LYOPHILIZED, FOR SOLUTION INTRAVENOUS at 05:56

## 2018-05-04 RX ADMIN — MODAFINIL 100 MILLIGRAM(S): 200 TABLET ORAL at 12:11

## 2018-05-04 RX ADMIN — Medication 5 MILLIGRAM(S): at 16:48

## 2018-05-04 RX ADMIN — MIDODRINE HYDROCHLORIDE 15 MILLIGRAM(S): 2.5 TABLET ORAL at 22:14

## 2018-05-04 RX ADMIN — ATORVASTATIN CALCIUM 80 MILLIGRAM(S): 80 TABLET, FILM COATED ORAL at 22:09

## 2018-05-04 RX ADMIN — Medication 1 MILLIGRAM(S): at 12:12

## 2018-05-04 RX ADMIN — HEPARIN SODIUM 14 UNIT(S)/HR: 5000 INJECTION INTRAVENOUS; SUBCUTANEOUS at 06:17

## 2018-05-04 RX ADMIN — INSULIN HUMAN 9 UNIT(S): 100 INJECTION, SOLUTION SUBCUTANEOUS at 05:56

## 2018-05-04 RX ADMIN — WARFARIN SODIUM 5 MILLIGRAM(S): 2.5 TABLET ORAL at 22:10

## 2018-05-04 RX ADMIN — INSULIN HUMAN 12 UNIT(S): 100 INJECTION, SOLUTION SUBCUTANEOUS at 19:16

## 2018-05-04 RX ADMIN — PIPERACILLIN AND TAZOBACTAM 200 GRAM(S): 4; .5 INJECTION, POWDER, LYOPHILIZED, FOR SOLUTION INTRAVENOUS at 14:24

## 2018-05-04 RX ADMIN — INSULIN HUMAN 9 UNIT(S): 100 INJECTION, SOLUTION SUBCUTANEOUS at 00:25

## 2018-05-04 RX ADMIN — Medication 12 MILLIGRAM(S): at 00:25

## 2018-05-04 RX ADMIN — MIDODRINE HYDROCHLORIDE 15 MILLIGRAM(S): 2.5 TABLET ORAL at 14:24

## 2018-05-04 RX ADMIN — Medication 1 TABLET(S): at 12:11

## 2018-05-04 RX ADMIN — CHLORHEXIDINE GLUCONATE 15 MILLILITER(S): 213 SOLUTION TOPICAL at 10:14

## 2018-05-04 RX ADMIN — CHLORHEXIDINE GLUCONATE 15 MILLILITER(S): 213 SOLUTION TOPICAL at 22:10

## 2018-05-04 RX ADMIN — Medication 10 MILLIGRAM(S): at 23:31

## 2018-05-04 NOTE — PROGRESS NOTE ADULT - SUBJECTIVE AND OBJECTIVE BOX
PROGRESS NOTE    CC:  Overnight Events:  Interval History:   ROS:    OBJECTIVE  Vitals:  T(C): 37 (05-04-18 @ 06:00), Max: 37.6 (05-03-18 @ 09:00)  HR: 96 (05-04-18 @ 05:08) (76 - 104)  BP: 88/64 (05-04-18 @ 05:08) (82/59 - 100/63)  RR: 17 (05-04-18 @ 05:08) (12 - 18)  SpO2: 100% (05-04-18 @ 05:42) (100% - 100%)  Wt(kg): --  Mode: AC/ CMV (Assist Control/ Continuous Mandatory Ventilation)  RR (machine): 10  TV (machine): 500  FiO2: 40  PEEP: 5  ITime: 1  MAP: 13  PIP: 21    I/O:  I&O's Summary    03 May 2018 07:01  -  04 May 2018 07:00  --------------------------------------------------------  IN: 3088 mL / OUT: 200 mL / NET: 2888 mL        PHYSICAL EXAM:  Appearance: NAD. Speaking in full sentences.   HEENT:   Conjunctiva clear b/l. Moist oral mucosa.  Cardiovascular: RRR with no murmurs.  Respiratory: Lungs CTAB.   Gastrointestinal:  Soft, nontender. Non-distended. Non-rigid.	  Extremities: No edema b/l. No erythema b/l. LE WWP b/l.  Vascular: DP 2+ b/l.  Neurologic:  Alert and awake. Moving all extremities. Following commands. Making eye contact.  	  LABS:                        9.1    15.9  )-----------( 100      ( 04 May 2018 06:07 )             29.1     05-04    134<L>  |  94<L>  |  48<H>  ----------------------------<  320<H>  4.0   |  22  |  3.02<H>    Ca    8.4      04 May 2018 06:06  Mg     1.9     05-04      PT/INR - ( 04 May 2018 06:07 )   PT: 26.3 sec;   INR: 2.33          PTT - ( 04 May 2018 06:07 )  PTT:77.3 sec      RADIOLOGY & ADDITIONAL TESTS:  Reviewed .    MEDICATIONS  (STANDING):  aspirin  chewable 81 milliGRAM(s) Oral daily  atorvastatin 80 milliGRAM(s) Oral at bedtime  chlorhexidine 0.12% Liquid 15 milliLiter(s) Swish and Spit two times a day  chlorhexidine 2% Cloths 1 Application(s) Topical daily  dextrose 5%. 1000 milliLiter(s) (50 mL/Hr) IV Continuous <Continuous>  dextrose 50% Injectable 12.5 Gram(s) IV Push once  dextrose 50% Injectable 25 Gram(s) IV Push once  dextrose 50% Injectable 25 Gram(s) IV Push once  ergocalciferol Drops 6000 Unit(s) Oral daily  escitalopram 5 milliGRAM(s) Oral daily  folic acid 1 milliGRAM(s) Oral daily  heparin  Infusion 1400 Unit(s)/Hr (14 mL/Hr) IV Continuous <Continuous>  insulin glargine Injectable (LANTUS) 20 Unit(s) SubCutaneous at bedtime  insulin regular  human corrective regimen sliding scale   SubCutaneous every 6 hours  insulin regular  human recombinant 9 Unit(s) SubCutaneous every 6 hours  levETIRAcetam  Solution 250 milliGRAM(s) Oral <User Schedule>  levETIRAcetam  Solution 500 milliGRAM(s) Oral every 24 hours  metoclopramide 5 milliGRAM(s) Oral every 6 hours  midodrine 15 milliGRAM(s) Oral every 8 hours  modafinil 100 milliGRAM(s) Oral <User Schedule>  multivitamin 1 Tablet(s) Oral daily  pantoprazole   Suspension 40 milliGRAM(s) Oral daily  piperacillin/tazobactam IVPB. 2.25 Gram(s) IV Intermittent every 8 hours  predniSONE   Tablet 12 milliGRAM(s) Oral every 8 hours  silver sulfADIAZINE 1% Cream 1 Application(s) Topical daily  thiamine 100 milliGRAM(s) Oral daily    MEDICATIONS  (PRN):  acetaminophen    Suspension. 650 milliGRAM(s) Oral every 6 hours PRN Moderate Pain (4 - 6)  acetaminophen  Suppository 650 milliGRAM(s) Rectal every 6 hours PRN For Temp greater than 38 C (100.4 F)  dextrose Gel 1 Dose(s) Oral once PRN Blood Glucose LESS THAN 70 milliGRAM(s)/deciliter  glucagon  Injectable 1 milliGRAM(s) IntraMuscular once PRN Glucose LESS THAN 70 milligrams/deciliter      ASSESSMENT:    PLAN: PROGRESS NOTE    CC: septic/cardiogenic shock, ATN now on HD, CVA  Overnight Events: BP remain in high SBP 85-94. MAPs>60. ELSIE.  Interval History: Opens eyes to verbal/painful stimuli. Minimally following commands. Unable to assess as patient not appropriately answering questions.  ROS: Unable to assess as above.    OBJECTIVE  Vitals:  T(C): 37 (05-04-18 @ 06:00), Max: 37.6 (05-03-18 @ 09:00)  HR: 96 (05-04-18 @ 05:08) (76 - 104)  BP: 88/64 (05-04-18 @ 05:08) (82/59 - 100/63)  RR: 17 (05-04-18 @ 05:08) (12 - 18)  SpO2: 100% (05-04-18 @ 05:42) (100% - 100%)  Wt(kg): --  Mode: AC/ CMV (Assist Control/ Continuous Mandatory Ventilation)  RR (machine): 10  TV (machine): 500  FiO2: 40  PEEP: 5  ITime: 1  MAP: 13  PIP: 21    I/O:  I&O's Summary    03 May 2018 07:01  -  04 May 2018 07:00  --------------------------------------------------------  IN: 3088 mL / OUT: 200 mL / NET: 2888 mL        PHYSICAL EXAM:  Appearance: NAD. Speaking in full sentences. Trach with vent on A/C.   HEENT:   Conjunctiva clear b/l. Moist oral mucosa.  Cardiovascular: Irregularly irregular, rate controlled. S1, S2 with no murmurs.  Respiratory: Lungs rhonchi b/l.  Gastrointestinal:  Soft, nontender. improving abdominal distension. nonrigid. PEG in place. 	  Extremities: 1+ edema b/l to mid ankles. No erythema b/l. LE WWP b/l.  Vascular: DP diminished.  Neurologic: More awake and alert. Making eye contact. Intermittently following commands, squeezing with R hand and moving R upper and moving b/l lower extremity not on command but withdrawal to pain.   	  LABS:                        9.1    15.9  )-----------( 100      ( 04 May 2018 06:07 )             29.1     05-04    134<L>  |  94<L>  |  48<H>  ----------------------------<  320<H>  4.0   |  22  |  3.02<H>    Ca    8.4      04 May 2018 06:06  Mg     1.9     05-04      PT/INR - ( 04 May 2018 06:07 )   PT: 26.3 sec;   INR: 2.33          PTT - ( 04 May 2018 06:07 )  PTT:77.3 sec      RADIOLOGY & ADDITIONAL TESTS:  Reviewed .    MEDICATIONS  (STANDING):  aspirin  chewable 81 milliGRAM(s) Oral daily  atorvastatin 80 milliGRAM(s) Oral at bedtime  chlorhexidine 0.12% Liquid 15 milliLiter(s) Swish and Spit two times a day  chlorhexidine 2% Cloths 1 Application(s) Topical daily  dextrose 5%. 1000 milliLiter(s) (50 mL/Hr) IV Continuous <Continuous>  dextrose 50% Injectable 12.5 Gram(s) IV Push once  dextrose 50% Injectable 25 Gram(s) IV Push once  dextrose 50% Injectable 25 Gram(s) IV Push once  ergocalciferol Drops 6000 Unit(s) Oral daily  escitalopram 5 milliGRAM(s) Oral daily  folic acid 1 milliGRAM(s) Oral daily  heparin  Infusion 1400 Unit(s)/Hr (14 mL/Hr) IV Continuous <Continuous>  insulin glargine Injectable (LANTUS) 20 Unit(s) SubCutaneous at bedtime  insulin regular  human corrective regimen sliding scale   SubCutaneous every 6 hours  insulin regular  human recombinant 9 Unit(s) SubCutaneous every 6 hours  levETIRAcetam  Solution 250 milliGRAM(s) Oral <User Schedule>  levETIRAcetam  Solution 500 milliGRAM(s) Oral every 24 hours  metoclopramide 5 milliGRAM(s) Oral every 6 hours  midodrine 15 milliGRAM(s) Oral every 8 hours  modafinil 100 milliGRAM(s) Oral <User Schedule>  multivitamin 1 Tablet(s) Oral daily  pantoprazole   Suspension 40 milliGRAM(s) Oral daily  piperacillin/tazobactam IVPB. 2.25 Gram(s) IV Intermittent every 8 hours  predniSONE   Tablet 12 milliGRAM(s) Oral every 8 hours  silver sulfADIAZINE 1% Cream 1 Application(s) Topical daily  thiamine 100 milliGRAM(s) Oral daily    MEDICATIONS  (PRN):  acetaminophen    Suspension. 650 milliGRAM(s) Oral every 6 hours PRN Moderate Pain (4 - 6)  acetaminophen  Suppository 650 milliGRAM(s) Rectal every 6 hours PRN For Temp greater than 38 C (100.4 F)  dextrose Gel 1 Dose(s) Oral once PRN Blood Glucose LESS THAN 70 milliGRAM(s)/deciliter  glucagon  Injectable 1 milliGRAM(s) IntraMuscular once PRN Glucose LESS THAN 70 milligrams/deciliter      ASSESSMENT:    PLAN: PROGRESS NOTE    CC: septic/cardiogenic shock, ATN now on HD, CVA  Overnight Events: BP remain in high SBP 85-94. MAPs>60. ELSIE.  Interval History: Opens eyes to verbal/painful stimuli. Minimally following commands. Unable to assess as patient not appropriately answering questions.  ROS: Unable to assess as above.    OBJECTIVE  Vitals:  T(C): 37 (05-04-18 @ 06:00), Max: 37.6 (05-03-18 @ 09:00)  HR: 96 (05-04-18 @ 05:08) (76 - 104)  BP: 88/64 (05-04-18 @ 05:08) (82/59 - 100/63)  RR: 17 (05-04-18 @ 05:08) (12 - 18)  SpO2: 100% (05-04-18 @ 05:42) (100% - 100%)  Wt(kg): --  Mode: AC/ CMV (Assist Control/ Continuous Mandatory Ventilation)  RR (machine): 10  TV (machine): 500  FiO2: 40  PEEP: 5  ITime: 1  MAP: 13  PIP: 21    I/O:  I&O's Summary    03 May 2018 07:01  -  04 May 2018 07:00  --------------------------------------------------------  IN: 3088 mL / OUT: 200 mL / NET: 2888 mL        PHYSICAL EXAM:  Appearance: NAD. Trach with vent on A/C.   HEENT:  Difficulty examination of eyes, poor compliance with exam though will open eyes to verbal command. Conjunctiva clear b/l. Moist oral mucosa.  Cardiovascular: Irregularly irregular, rate controlled. S1, S2 with no murmurs.  Respiratory: Lungs rhonchi b/l.  Gastrointestinal:  Soft, nontender. improving abdominal distension. nonrigid. PEG in place. 	  Extremities: 1+ edema b/l to mid ankles. No erythema b/l. LE WWP b/l.  Vascular: DP diminished.  Neurologic: Lethargic though arousable. Opens eyes to verbal/painful stimuli. Intermittently following commands, squeezing with R hand and moving R upper and moving b/l lower extremity not on command but withdrawal to pain.   	  LABS:                        9.1    15.9  )-----------( 100      ( 04 May 2018 06:07 )             29.1     05-04    134<L>  |  94<L>  |  48<H>  ----------------------------<  320<H>  4.0   |  22  |  3.02<H>    Ca    8.4      04 May 2018 06:06  Mg     1.9     05-04      PT/INR - ( 04 May 2018 06:07 )   PT: 26.3 sec;   INR: 2.33          PTT - ( 04 May 2018 06:07 )  PTT:77.3 sec      RADIOLOGY & ADDITIONAL TESTS:  Reviewed .    MEDICATIONS  (STANDING):  aspirin  chewable 81 milliGRAM(s) Oral daily  atorvastatin 80 milliGRAM(s) Oral at bedtime  chlorhexidine 0.12% Liquid 15 milliLiter(s) Swish and Spit two times a day  chlorhexidine 2% Cloths 1 Application(s) Topical daily  dextrose 5%. 1000 milliLiter(s) (50 mL/Hr) IV Continuous <Continuous>  dextrose 50% Injectable 12.5 Gram(s) IV Push once  dextrose 50% Injectable 25 Gram(s) IV Push once  dextrose 50% Injectable 25 Gram(s) IV Push once  ergocalciferol Drops 6000 Unit(s) Oral daily  escitalopram 5 milliGRAM(s) Oral daily  folic acid 1 milliGRAM(s) Oral daily  heparin  Infusion 1400 Unit(s)/Hr (14 mL/Hr) IV Continuous <Continuous>  insulin glargine Injectable (LANTUS) 20 Unit(s) SubCutaneous at bedtime  insulin regular  human corrective regimen sliding scale   SubCutaneous every 6 hours  insulin regular  human recombinant 9 Unit(s) SubCutaneous every 6 hours  levETIRAcetam  Solution 250 milliGRAM(s) Oral <User Schedule>  levETIRAcetam  Solution 500 milliGRAM(s) Oral every 24 hours  metoclopramide 5 milliGRAM(s) Oral every 6 hours  midodrine 15 milliGRAM(s) Oral every 8 hours  modafinil 100 milliGRAM(s) Oral <User Schedule>  multivitamin 1 Tablet(s) Oral daily  pantoprazole   Suspension 40 milliGRAM(s) Oral daily  piperacillin/tazobactam IVPB. 2.25 Gram(s) IV Intermittent every 8 hours  predniSONE   Tablet 12 milliGRAM(s) Oral every 8 hours  silver sulfADIAZINE 1% Cream 1 Application(s) Topical daily  thiamine 100 milliGRAM(s) Oral daily    MEDICATIONS  (PRN):  acetaminophen    Suspension. 650 milliGRAM(s) Oral every 6 hours PRN Moderate Pain (4 - 6)  acetaminophen  Suppository 650 milliGRAM(s) Rectal every 6 hours PRN For Temp greater than 38 C (100.4 F)  dextrose Gel 1 Dose(s) Oral once PRN Blood Glucose LESS THAN 70 milliGRAM(s)/deciliter  glucagon  Injectable 1 milliGRAM(s) IntraMuscular once PRN Glucose LESS THAN 70 milligrams/deciliter      ASSESSMENT:    PLAN:

## 2018-05-04 NOTE — CHART NOTE - NSCHARTNOTEFT_GEN_A_CORE
Admitting Diagnosis:   63M PMH HFrEF 10-15% (ischemic), MI, s/p AICD vs PPM, possible Afib, HTN, DM2 on insulin, possible CKD, and gout, who presents with a chief complaint of generalized weakness s/p septic shock likely 2/2 LE cellulitis. AMS and new leukocytisis likely 2/2 UTI. Trach and PEG dependent. Continues to receive HD TIW.      PAST MEDICAL & SURGICAL HISTORY:  Type 2 diabetes mellitus with diabetic peripheral angiopathy without gangrene, with long-term curren  Essential hypertension, benign  Gout  Pacemaker  Chronic systolic heart failure  Myocardial infarction  No significant past surgical history      Current Nutrition Order:  Ordered for Vital 1.5 @ 63ml/hr x 24hr via PEG+ 2x prostat (1512ml TV, 2468kcal, 132g, 1155ml free H2O, 151%RDI)- running at goal     PO Intake: Good (%) [   ]  Fair (50-75%) [   ] Poor (<25%) [   ]- N/A TF dependent    GI Issues: No N/V noted by RN; metamucil stopped (d/t distention?) and constipation; BM 5/, not loose    Pain: Unable to assess at this time 2/2 vent     Skin Integrity:   L buttock stage 2 PU  L calf venous ulcer  Trach stage 3 PU  L. UE eschar   Labs:       134<L>  |  94<L>  |  48<H>  ----------------------------<  320<H>  4.0   |  22  |  3.02<H>    Ca    8.4      04 May 2018 06:06  Mg     1.9           CAPILLARY BLOOD GLUCOSE      POCT Blood Glucose.: 362 mg/dL (04 May 2018 12:06)  POCT Blood Glucose.: 339 mg/dL (04 May 2018 05:54)  POCT Blood Glucose.: 299 mg/dL (04 May 2018 00:14)  POCT Blood Glucose.: 269 mg/dL (03 May 2018 21:12)  POCT Blood Glucose.: 257 mg/dL (03 May 2018 17:58)  POCT Blood Glucose.: 301 mg/dL (03 May 2018 16:30)      Medications:  MEDICATIONS  (STANDING):  aspirin  chewable 81 milliGRAM(s) Oral daily  atorvastatin 80 milliGRAM(s) Oral at bedtime  chlorhexidine 0.12% Liquid 15 milliLiter(s) Swish and Spit two times a day  chlorhexidine 2% Cloths 1 Application(s) Topical daily  dextrose 5%. 1000 milliLiter(s) (50 mL/Hr) IV Continuous <Continuous>  dextrose 50% Injectable 12.5 Gram(s) IV Push once  dextrose 50% Injectable 25 Gram(s) IV Push once  dextrose 50% Injectable 25 Gram(s) IV Push once  ergocalciferol Drops 6000 Unit(s) Oral daily  escitalopram 5 milliGRAM(s) Oral daily  folic acid 1 milliGRAM(s) Oral daily  insulin glargine Injectable (LANTUS) 20 Unit(s) SubCutaneous at bedtime  insulin regular  human corrective regimen sliding scale   SubCutaneous every 6 hours  insulin regular  human recombinant 9 Unit(s) SubCutaneous every 6 hours  levETIRAcetam  Solution 250 milliGRAM(s) Oral <User Schedule>  levETIRAcetam  Solution 500 milliGRAM(s) Oral every 24 hours  metoclopramide 5 milliGRAM(s) Oral every 6 hours  midodrine 15 milliGRAM(s) Oral every 8 hours  modafinil 100 milliGRAM(s) Oral <User Schedule>  multivitamin 1 Tablet(s) Oral daily  pantoprazole   Suspension 40 milliGRAM(s) Oral daily  piperacillin/tazobactam IVPB. 2.25 Gram(s) IV Intermittent every 8 hours  predniSONE   Tablet 12 milliGRAM(s) Oral every 8 hours  silver sulfADIAZINE 1% Cream 1 Application(s) Topical daily  thiamine 100 milliGRAM(s) Oral daily    MEDICATIONS  (PRN):  acetaminophen    Suspension. 650 milliGRAM(s) Oral every 6 hours PRN Moderate Pain (4 - 6)  acetaminophen  Suppository 650 milliGRAM(s) Rectal every 6 hours PRN For Temp greater than 38 C (100.4 F)  dextrose Gel 1 Dose(s) Oral once PRN Blood Glucose LESS THAN 70 milliGRAM(s)/deciliter  glucagon  Injectable 1 milliGRAM(s) IntraMuscular once PRN Glucose LESS THAN 70 milligrams/deciliter      Weight:  Daily     Daily Weight in k.2 (03 May 2018 18:00)    Weight:  85.1kg ()  79.1kg ()  75.9kg ()  77.4kg ()  72.7kg ()  77.2kg ()  80.9 kg ()  84.5kg (3/31)  86.9kg (3/19)  94.7 kg (3/16)    Weight Change:   Weight fluctuating throughout admission d/t HD, TF inconsistencies. Pt did not complete HD on 5/3 d/t hypotension and likely retaining fluid    Estimated energy needs using 89 kg IBW; Needs estimated 2/ vent/post-op/PU  Calories: 25-30 kcal/kg = 4411-9494 kcal/day  Protein: 1.4-1.6 g/kg = 125-142 g protein/day  Fluids: per team  HD     Subjective:   S/p trach and PEG placements on . S/p permacath replacement on . Seen in room, asleep, not alert, does not open eyes to voice or touch. Remains trached to vent, VC/AC mode. TF running at goal with good tolerance, no residuals. BM this morning, not loose. Though abdomen is distended- continue with Reglan. HD was attempted on 5/3 but pt did not tolerate d/t hypotension and HD was stopped. BG levels have been elevated since TF resumed on , though would advise against switching to Glucerna as patient has hx of not tolerating Glucerna and usually does well on Vital. BUN 48, Cr 3.02, Phos 5.0 (H), Na 134 (L).    Previous Nutrition Diagnosis:   Increased protein-calorie needs RT increased demand for protein-calorie intake AEB on vent support    Active [X]  Resolved [   ]    New PES statement:     Goal:   Continue to meet % of nutrition needs via tolerated route.     Recommendations:  1. Continue Vital 1.5 via PEG @ 63mL/hr x 24hrs plus ProStat Sugar Free BID (200 kcal, 30g protein)  2. Continue to trend weights  3 Monitor POC BG- recommend adjusting insulin to control BG levels   4. Continue to monitor for GOC and keep nutrition in line at all times     Education:   N/A-vent    Risk Level: High [X] Moderate [   ] Low [   ]

## 2018-05-04 NOTE — PROGRESS NOTE ADULT - PROBLEM SELECTOR PLAN 10
VTE: Hep gtt, Coumadin  GI PPx: PPI    FULL CODE    F: No IVF in setting of HD.  E: Replete electrolytes with caution in setting of HD.   N:  Restarted on tube feeds with Vital 1.5 Terrell at 63cc/hr    Dispo: Initially admitted to MICU but now on 7LACH for further management of hypotension in setting of shock, acute encephalopathy, bacteremia and acute on chronic respiratory failure. Course complicated by Septic shock 2/2 to aspiration PNA for which patient now off levophed. Now on 7LACH for further management of aspiraton PNA, chronic respiratory failure, hypotension and glucose control. Dispo Pending L-tACH with clinical stabilization. VTE:Coumadin  GI PPx: PPI    FULL CODE    F: No IVF in setting of HD.  E: Replete electrolytes with caution in setting of HD.   N:  Restarted on tube feeds with Vital 1.5 Terrell at 63cc/hr    Dispo: Initially admitted to MICU but now on 7LACH for further management of hypotension in setting of shock, acute encephalopathy, bacteremia and acute on chronic respiratory failure. Course complicated by Septic shock 2/2 to aspiration PNA for which patient now off levophed. Now on 7LACH for further management of aspiraton PNA, chronic respiratory failure, hypotension and glucose control. Dispo Pending L-tACH with clinical stabilization.

## 2018-05-04 NOTE — PROGRESS NOTE ADULT - PROBLEM SELECTOR PLAN 5
Likely 2/2 phlebotomy and CKD, No bloody output. Prior studies showed Anemia of chronic disease. Hgb at 9.4 today.  - monitor CBC, transfuse if Hgb <7  -Keep active T&S Likely 2/2 phlebotomy and CKD, No bloody output. Prior studies showed Anemia of chronic disease. Hgb at 9.1. Remains stable.   - monitor CBC, transfuse if Hgb <7  -Keep active T&S

## 2018-05-04 NOTE — PROGRESS NOTE ADULT - PROBLEM SELECTOR PLAN 6
DM2 on Januvia at home. HbA1C 8.6. Has been on Lantus 42U qHS, insulin 7 units q6h. Insulin was decreased to 21U for being NPO but noted to have complication of hypoglycemia.   -c/w MISS, Lantus 20 qHS and regular insulin increased to 9U today. Given increasing FS will further adjust insulin. DM2 on Januvia at home. HbA1C 8.6. Has been on Lantus 42U qHS, insulin 7 units q6h. Insulin was decreased to 21U for being NPO but noted to have complication of hypoglycemia.   -FS continuing to rise. Increased Lantus to 30U and Regular insulin 12U q6h.

## 2018-05-04 NOTE — PROGRESS NOTE ADULT - PROBLEM SELECTOR PLAN 2
s/p trach.  on mechanical vent  pt noted to have apenic periods while on CPAP trial today.  This was witnessed today

## 2018-05-04 NOTE — PROGRESS NOTE ADULT - PROBLEM SELECTOR PLAN 3
Acute change in mental status likely 2/2 brainstem infarct with component of encephalopathy from infection. 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 Per neurology may have had ischemic event from hypoperfusion. Also showing disc protrusion at C3/C4 level coursing superiorly to the C2/C3 contacting the ventral spinal cord. Per neurology, this may be contributing to weakness, however would not explain change in mental status.  Exam limited- minimal responsiveness to verbal painful stimuli.   - c/w Modafinil  - No MRI at this time for further prognostication.  -Exam notable for some improvement in lethargy moving R side> left.     #Seizures- c/w keppra 500 mg qd with extra 250 mg post HD days Acute change in mental status likely 2/2 brainstem infarct with component of encephalopathy from infection. 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 Per neurology may have had ischemic event from hypoperfusion. Also showing disc protrusion at C3/C4 level coursing superiorly to the C2/C3 contacting the ventral spinal cord. Per neurology, this may be contributing to weakness, however would not explain change in mental status. Exam limited- minimal responsiveness to verbal painful stimuli. Minimally following commands intermittently. Moving RUE, no LUE movement.  - c/w Modafinil  - No MRI at this time for further prognostication.  -Exam remains notable for lethargy, opens eyes to verbal or painful stimuli.     #Seizures- c/w keppra 500 mg qd with extra 250 mg post HD days

## 2018-05-04 NOTE — PROGRESS NOTE ADULT - SUBJECTIVE AND OBJECTIVE BOX
Patient is a 63y Male seen and evaluated at bedside. Patient lying in bed in no acute distress remains on ventilatory support and off IV pressors. Patient developed hypotension and tachycardia during HD yesterday with minimal fluid removal due to low blood pressure.     acetaminophen    Suspension. 650 every 6 hours PRN  acetaminophen  Suppository 650 every 6 hours PRN  aspirin  chewable 81 daily  atorvastatin 80 at bedtime  chlorhexidine 0.12% Liquid 15 two times a day  chlorhexidine 2% Cloths 1 daily  dextrose 5%. 1000 <Continuous>  dextrose 50% Injectable 12.5 once  dextrose 50% Injectable 25 once  dextrose 50% Injectable 25 once  dextrose Gel 1 once PRN  ergocalciferol Drops 6000 daily  escitalopram 5 daily  folic acid 1 daily  glucagon  Injectable 1 once PRN  insulin glargine Injectable (LANTUS) 20 at bedtime  insulin regular  human corrective regimen sliding scale  every 6 hours  insulin regular  human recombinant 9 every 6 hours  levETIRAcetam  Solution 250 <User Schedule>  levETIRAcetam  Solution 500 every 24 hours  metoclopramide 5 every 6 hours  midodrine 15 every 8 hours  modafinil 100 <User Schedule>  multivitamin 1 daily  pantoprazole   Suspension 40 daily  piperacillin/tazobactam IVPB. 2.25 every 8 hours  predniSONE   Tablet 12 every 8 hours  silver sulfADIAZINE 1% Cream 1 daily  thiamine 100 daily      Allergies    No Known Allergies    Intolerances        T(C): , Max: 37 (05-04-18 @ 06:00)  T(F): , Max: 98.6 (05-04-18 @ 06:00)  HR: 94 (05-04-18 @ 11:42)  BP: 83/61 (05-04-18 @ 08:25)  BP(mean): 68 (05-04-18 @ 08:25)  RR: 20 (05-04-18 @ 08:25)  SpO2: 100% (05-04-18 @ 11:42)  Wt(kg): --    05-03 @ 07:01  -  05-04 @ 07:00  --------------------------------------------------------  IN: 3088 mL / OUT: 200 mL / NET: 2888 mL    05-04 @ 07:01  -  05-04 @ 12:05  --------------------------------------------------------  IN: 267 mL / OUT: 0 mL / NET: 267 mL    Review of Systems:  Limited. Patient remains on ventilatory support     PHYSICAL EXAM:  GENERAL Ill appearing, lying in bed, confused and disoriented in no acute distress at present on ventilatory support   HEAD:  Atraumatic, Normocephalic,   EYES: Bilateral conjuctival and scleral pallor+nt  Oral cavity: Oral mucosa dry and pale  NECK: Neck supple, No JVP, Tracheostomy present.   CHEST/LUNG: Bilateral decreased breath sounds, Bibasilar rales and crepitatiosn+nt, no wheezing  HEART: Regular rate and rhythm. HOMER II/VI at LPSB, No gallop, no rub   ABDOMEN: Soft, Nontender, non distended, PEG tube present. BS+nt, No flank tenderness.   EXTREMITIES: Bilateral thigh and dependent abdominal/thigh edema present No clubbing, cyanosis  Neurology: AAOx1, confused and disoriented, Able to move extremities.   SKIN: No rashes or lesions        ACCESS: Right chest wall tunnel HD catheter present. No bleeding          LABS:                        9.1    15.9  )-----------( 100      ( 04 May 2018 06:07 )             29.1     05-04    134<L>  |  94<L>  |  48<H>  ----------------------------<  320<H>  4.0   |  22  |  3.02<H>    Ca    8.4      04 May 2018 06:06  Mg     1.9     05-04        PT/INR - ( 04 May 2018 06:07 )   PT: 26.3 sec;   INR: 2.33          PTT - ( 04 May 2018 06:07 )  PTT:77.3 sec          RADIOLOGY & ADDITIONAL STUDIES:  < from: Xray Chest 1 View- PORTABLE-Routine (05.04.18 @ 06:29) >    EXAM:  XR CHEST PORTABLE ROUTINE 1V                          PROCEDURE DATE:  05/04/2018                     INTERPRETATION:  Portable chest    History: Septic shock respiratory failure follow-up abnormal exam    Similar to prior exam with pleural effusions and possible lower lung   infiltrates again noted compared to prior exam 5/3/2018. Left-sided   implanted cardiac device tracheostomy tube and venous catheters unchanged.            "Thank you for the opportunity to participate in the care of this   patient."        HESHAM BERRIOS M.D., ATTENDING RADIOLOGIST  This document has been electronically signed. May  4 2018 10:05AM                  < end of copied text >

## 2018-05-04 NOTE — PROGRESS NOTE ADULT - PROBLEM SELECTOR PLAN 4
Likely 2/2 to ischemic ATN in setting of sepsis with hypoperfusion (unknown baseline) presented with Cr 3.93 with metabolic derangements. Renal following, remains on HD. Permacath replaced multiple times over course for bacteremia and fungemia. Now with L subclavian HD cath.   - f/u renal recs- Patient will again get HD today as per renal.   -Remains off albumin.    #Adrenal Insufficiency- previously on fludrocortisone and prednisone. Started on stress dose steroids in setting of acute infection.  -weaning off steroids, switch to solucortef to prednisone.    #Elevated AG-has fluctuated between 20-25, Bicarb 20-22. Lactate negative, glucose has been well controlled, likely 2/2 uremia in setting of ESRD.   - Monitor BMP Likely 2/2 to ischemic ATN in setting of sepsis with hypoperfusion (unknown baseline) presented with Cr 3.93 with metabolic derangements. Renal following, remains on HD. Permacath replaced multiple times over course for bacteremia and fungemia. Now with L subclavian HD cath.   - f/u renal recs- HD cut short yesterday for hypotension. Of note took more off on Wednesday. No HD today.  -Remains off albumin.    #Adrenal Insufficiency- previously on fludrocortisone and prednisone. Started on stress dose steroids in setting of acute infection.  -weaning off steroids, with prednisone 12 mg q8h.    #Elevated AG-has fluctuated between 20-25, Bicarb 20-22. Lactate negative, glucose has been well controlled, likely 2/2 uremia in setting of ESRD.   - Monitor BMP

## 2018-05-04 NOTE — PROGRESS NOTE ADULT - PROBLEM SELECTOR PLAN 1
Course complicated by septic shock 2/2 to aspiration with increasing O2 requirements for which patient was back in MICU. Tx with Zosyn. Resolving septic shock-requiring levophed. No longer on pressors. Weaning steroids  -c/w Zosyn (today is day 7, plan for 10 day course).   -WBC at 15 from 19 yesterday. Decreased loose stools.    #Fungemia  Noted to have Candida Tropicalis growing in blood cultures and completed fluconazole course. Resolved.

## 2018-05-04 NOTE — PROGRESS NOTE ADULT - PROBLEM SELECTOR PLAN 9
Hx of Afib and LV thrombus on coumadin prior to admission. Most recent TTE with [facility] shows no LV thrombus currently.   - c/w holding Metoprolol 12.5 q12h given hypotension and HR as been controlled in 70-90s. Will use Dig for rate control if RVR- goal <110  -c/w hep gtt and continue with bridging to coumadin.       #LV thrombus  LV thrombus noted on RUKHSANA on 4/10 for which patient has been on hep gtt. C/w bridge.  -c/w Coumadin, dose for tonight. INR at 1.9 this AM. Hx of Afib and LV thrombus on coumadin prior to admission. Most recent TTE with [facility] shows no LV thrombus currently.   - c/w holding Metoprolol 12.5 q12h given hypotension and HR as been controlled in 80-100s. Will use Dig for rate control if RVR- goal <110  -Now therapeutic on coumadin- dosed for 5mg tonight. INR at 2.3 this AM. Repeat INR in AM.       #LV thrombus  LV thrombus noted on RUKHSANA on 4/10 for which patient has been on hep gtt. C/w bridge.  -c/w Coumadin, dose for tonight. INR at 1.9 this AM.

## 2018-05-04 NOTE — PROGRESS NOTE ADULT - PROBLEM SELECTOR PLAN 1
Oligoanuric GILMER due to ATN on RRT at present.   Last HD treatment on 5/3 with 0.5 L UF   Will defer HD treatment today.  Volume status mildly hypervolemic and electrolytes stable  Low K/Low phos/renal feeding.  Continue midodrine through peg tube now.

## 2018-05-04 NOTE — PROGRESS NOTE ADULT - PROBLEM SELECTOR PLAN 2
Course has been complicated by septic shock as well as component of cardiogenic shock. Patient required pressors and inotropes for which he has been weaned off. On midodrine 15 mg TID. Previously on albumin with dialysis that has been weaned off and has remained off at this time.  - c/w Midodrine 15 mg q8h to maintain SBP>65  -c/w prednisone 12mg q8h    #Adrenal Insufficiency  BP have been low with SBP in 80s-low 90s, though MAP> 60.  - c/w weaning steroids, prednisone 12mg q8h

## 2018-05-04 NOTE — PROGRESS NOTE ADULT - PROBLEM SELECTOR PLAN 4
as per echo on 4/10-- severe global hypokinesis of LV with est EF of < 15%  AICD noted to L chest wall-- if pts HCP makes pt "DNR" his AICD would need to be addressed  management per primary team

## 2018-05-04 NOTE — PROGRESS NOTE ADULT - SUBJECTIVE AND OBJECTIVE BOX
HUNTER BRIZUELA   [id number]     (1955):     HPI:  62 yo M history of HFrEF 10-15% 2/2 ischemic cardiomyopathy, MI , s/p AICD vs PPM, ?Afib, Hypertension, Diabetes Mellitus Type 2 on insulin, CKD?and gout, who presents with a chief complaint of generalized weakness. Pt wad admitted to [facility] 2 months ago for gout and was sent to rehab. He was discharged home mid Feb with VNS. In the past 2 weeks patient has noted increased leg pain and weakness bilaterally. In the last few days, he has also experienced generalized weakness prompting him to come to ER.   In ER, noted to have t max of 102, SBP 70s, leukocytosis to 32.8, Lactate of 6.6, Acute renal failure with severe metabolic derangements. Given 3.5L fluids and Vanc/Zosyn. MICU consulted. (10 Mar 2018 18:51)    Pt known to palliative medicine earlier in this hospitalization when pt was initially in the MICU.  During that time, pt was critically ill  on life support (mech ventilation, CVVHD, pressors).  [employer] met with pt's HCP, (dgt Cristal) to discuss overall goals of care, as pt was not trached or PEG'd yet.   Pt had minimal mental status. Pt was able to open eyes, follow simple commands intermittently.  Pt's dgt agreed to trach and vent as well as PEG to allow pt time to make an improvement as pt has suffered a CVA and his neurological prognosis was unclear. Palliative medicine signed off on .  The goal at that time was to allow pt to stabilize with a plan to transition to LTAC, when medically appropriate.  Pt's dgt was going to reassess pt's overall condition after some time...  Pt has remained in the hospital since .  He has been in (mostly) and out of ICU.  He remains vented thru his trach, he is on HD with high dose midodrine He currently does not require albumin to tolerate his HD sessions.  Pts mental status remains poor.    Palliative medicine will reconsult to discuss with pts dgt plan of care as pt has been hospitalized for months.     Interval History:  see above    RN reports that pt was able to tolerate HD yesterday and pt had his eyes open for some time.  No eye opening during my visit in which pt was turned from side to side multiple times in order to change pt's linens.     ROS:   nonverbal   Unable to attain ROS due to:  poor mental status                    Dyspnea (Heriberto 0-10):                       N/V (Y/N):                            Secretions (Y/N):              Agitation(Y/N):  Pain (Y/N):       -Provocation/Palliation:  -Quality/Quantity:  -Radiating:  -Severity:  -Timing/Frequency:  -Impact on ADLs:    Allergies:  No Known Allergies    Intolerances    Opiate Naive (Y/N):   yes  -iStop reviewed (Y/N):  yes ref # [id number]    MEDICATIONS  (STANDING):  aspirin  chewable 81 milliGRAM(s) Oral daily  atorvastatin 80 milliGRAM(s) Oral at bedtime  chlorhexidine 0.12% Liquid 15 milliLiter(s) Swish and Spit two times a day  chlorhexidine 2% Cloths 1 Application(s) Topical daily  dextrose 5%. 1000 milliLiter(s) (50 mL/Hr) IV Continuous <Continuous>  dextrose 50% Injectable 12.5 Gram(s) IV Push once  dextrose 50% Injectable 25 Gram(s) IV Push once  dextrose 50% Injectable 25 Gram(s) IV Push once  ergocalciferol Drops 6000 Unit(s) Oral daily  escitalopram 5 milliGRAM(s) Oral daily  folic acid 1 milliGRAM(s) Oral daily  insulin glargine Injectable (LANTUS) 30 Unit(s) SubCutaneous at bedtime  insulin regular  human corrective regimen sliding scale   SubCutaneous every 6 hours  insulin regular  human recombinant 12 Unit(s) SubCutaneous every 6 hours  levETIRAcetam  Solution 250 milliGRAM(s) Oral <User Schedule>  levETIRAcetam  Solution 500 milliGRAM(s) Oral every 24 hours  metoclopramide 5 milliGRAM(s) Oral every 6 hours  midodrine 15 milliGRAM(s) Oral every 8 hours  multivitamin 1 Tablet(s) Oral daily  pantoprazole   Suspension 40 milliGRAM(s) Oral daily  piperacillin/tazobactam IVPB. 2.25 Gram(s) IV Intermittent every 8 hours  predniSONE   Tablet 12 milliGRAM(s) Oral every 8 hours  silver sulfADIAZINE 1% Cream 1 Application(s) Topical daily  thiamine 100 milliGRAM(s) Oral daily    MEDICATIONS  (PRN):  acetaminophen    Suspension. 650 milliGRAM(s) Oral every 6 hours PRN Moderate Pain (4 - 6)  acetaminophen  Suppository 650 milliGRAM(s) Rectal every 6 hours PRN For Temp greater than 38 C (100.4 F)  dextrose Gel 1 Dose(s) Oral once PRN Blood Glucose LESS THAN 70 milliGRAM(s)/deciliter  glucagon  Injectable 1 milliGRAM(s) IntraMuscular once PRN Glucose LESS THAN 70 milligrams/deciliter    Labs:                                                  9.1    15.9  )-----------( 100      ( 04 May 2018 06:07 )             29.1     05-04    134<L>  |  94<L>  |  48<H>  ----------------------------<  320<H>  4.0   |  22  |  3.02<H>    Ca    8.4      04 May 2018 06:06  Mg     1.9     05-04    IMAGING:      none new       PEx:  Vital Signs Last 24 Hrs  T(C): 36.8 (04 May 2018 13:38), Max: 37 (04 May 2018 06:00)  T(F): 98.3 (04 May 2018 13:38), Max: 98.6 (04 May 2018 06:00)  HR: 112 (04 May 2018 15:54) (76 - 118)  BP: 100/77 (04 May 2018 15:54) (82/59 - 100/77)  BP(mean): 86 (04 May 2018 15:54) (68 - 86)  RR: 20 (04 May 2018 15:54) (12 - 20)  SpO2: 100% (04 May 2018 15:54) (97% - 100%)      General:  ill appearing, thin, not distressed   HEENT:  nc/at, thin,  temporal wasting, moist oral cavity, eyes remain closed  Neck:   supple, neg lymphadenopathy,  L IJ TLC, trach in place-- attached to vent  CVS:  SR,  rate controlled, pacer noted to  L chest wall, + R HD catheter, distant heart tones  Resp:  non-labored, no rhonchi  GI:   nondistended, soft,  +PEG tube in place  :   + scrotal edema, anuric  Musc:   weak, thin, no spontaneous movement  Ext: significant generalized peripheral edema  Neuro:   not following commands, weak withdrawal from pain to R upper ext, tremors noted to LUE  Psych: maritza  Skin:  thin, dry, cool, + generalized edema although improving, pitting edema to the hips bilaterally  Lymph:  neg  Preadmit Karnofsky:   50 %           Current Karnofsky:   30    %  Cachexia (Y/N):   yes  BMI:  20.6  (in March, pts BMI was 22)    Advanced Directives:     Full Code     HCP noted on chart     DPOA  (for finances)       Decision maker:   pt was deemed not to have decision making capacity on  by psychiatry  Legal surrogate:  pts dgt Cristal Castro [phone] is pts HCP    Social History:   single, lives by self, pt has a HHA 4hrs/day, 3 days per week.  Pt has 2 adult children (Cristal aged 37) and pts son, is 24 and lives in [facility] [place] [facility]. 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    He is retired but reports he is not on disability.   Pt is "spiritual" and practices a "Sabianist" raquel background   Per notes, pt has not been compliant with his medication (not picked up prescription from his outpt pharmacy) since 2017.    GOALS OF CARE DISCUSSION:       Palliative care info/counseling provided-- following	           Family meeting-  today, see below for details       Advanced Directives addressed please see Advance Care Planning Note-- 	           Documentation of GOC:  to allow more time for a neurologic recovery          REFERRALS:	        Palliative Med        Unit SW/Case Mgmt       - referred       Massage Therapy-- referred       Music Therapy-- referred       Nutrition-- following

## 2018-05-04 NOTE — PROGRESS NOTE ADULT - PROBLEM SELECTOR PLAN 4
Patient's Calcium 9.0 and phosphorus 5.0 at present.  Will continue ergo for now.  Low k/Low phos/renal feeding  Hectoral during HD treatment.  Monitor Calcium and phophorus level.

## 2018-05-04 NOTE — PROGRESS NOTE ADULT - ATTENDING COMMENTS
Patient seen and examined with house-staff during bedside rounds.  Resident note read, including vitals, physical findings, laboratory data, and radiological reports.   Revisions included below.  Direct personal management at bed side and extensive interpretation of the data.  Plan was outlined and discussed in details with the housestaff.  Decision making of high complexity  Action taken for acute disease activity to reflect the level of care provided:  - medication reconciliation  - review laboratory data  - Management of MV and respiratory failure  - continue HD  - Continue antibiotic for total 10 days  - No change inCXR

## 2018-05-04 NOTE — PROGRESS NOTE ADULT - PROBLEM SELECTOR PLAN 7
S/p tracheostomy 4/5. Previous bedside ultrasound notable for b/l pleural effusions and atelectatic lung-Likely related to fluid overload. If clinically worsening, can consider thoracentesis for fluid studies and cultures. Course further complicated by septic shock from aspiration PNA.   - c/w daily CPAP trials, weaning to trach collar as tolerated  - c/w HD to remove excess fluid  -c/w zosyn (Day 6/10 day course).   -Pending dispo for LTach. S/p tracheostomy 4/5. Previous bedside ultrasound notable for b/l pleural effusions and atelectatic lung-Likely related to fluid overload. If clinically worsening, can consider thoracentesis for fluid studies and cultures. Course further complicated by septic shock from aspiration PNA.   - c/w daily CPAP trials, weaning to trach collar as tolerated  - f/u renal for HD for fluid overload. Noted to take off extra on Wednesday and cut short on THursday (5/3) 2/2 to hypotension. Will continue to monitor HD.   -c/w zosyn (Day 7/10 day course).

## 2018-05-04 NOTE — PROGRESS NOTE ADULT - ASSESSMENT
63M PMH HFrEF 10-15% (ischemic), MI, s/p AICD vs PPM, possible Afib, HTN, DM2 on insulin, possible CKD, and gout, who presented with a chief complaint of generalized weakness s/p septic shock likely 2/2 LE cellulitis complicated by ATN requiring dialysis. Course complicated by AMS likely from brainstem infarct 2/2 LV thrombus and/or toxic metabolic encephalopathy and found to have candidemia and sepsis 2/2 klebsiella bacteremia s/p fluconazole and CTX. Course further complicated by aspiration PNA and currently on Zosyn for plan of 10 days. Patient now therapeutic on warfarin. 63M PMH HFrEF 10-15% (ischemic), MI, s/p AICD vs PPM, possible Afib, HTN, DM2 on insulin, possible CKD, and gout, who presented with a chief complaint of generalized weakness s/p septic shock likely 2/2 LE cellulitis complicated by ATN requiring dialysis. Course complicated by AMS likely from brainstem infarct 2/2 LV thrombus and/or toxic metabolic encephalopathy and found to have candidemia and sepsis 2/2 klebsiella bacteremia s/p fluconazole and CTX. Course further complicated by aspiration PNA and currently on Zosyn (7days/10) for plan of 10 days. Patient now therapeutic on warfarin.

## 2018-05-04 NOTE — PROGRESS NOTE ADULT - PROBLEM SELECTOR PLAN 8
Advance care planning meeting  Start time:  115pm  End time:  151p  Total time:  36min    A face to face meeting to discuss advance care planning was held today regarding: HUNTER BRIZUELA  Primary decision maker: Cristal RubénStephanie Manzanares  Alternate/surrogate:  Discussed advance directives including, but not limited to, healthcare proxy and code status.  Plan of care discussed and pt's lack of improvement over the past 4 weeks.  DNR/DNI, withdrawal of life sustaining therapy as well as limiting life prolonging therapy. was discussed. Pts HCP does not feel comfortable withdrawing life support as at times pt will have his eyes open, which she sees as a slight improvement. HCP is considering dnr order.  We discussed a MOLST as an extension of the medical decisions made during the hospital.  Ms Castro will review the MOLST and we will talk again on Friday.   Pts HCP seems to have insight into the lack of improvement in pt but is struggling in terms of decision making moving forward.  Additionally, pts son (HCPs half brother) is not in agreement with HCP, so she feels that she is shouldering this burden on her own. Emotional support provided.   Decision regarding code status:  full code  Documentation completed today:  none  **NB-- if pts HCP decide to make pt DNR, his AICD needs to be considered and either deactived or left on, but it needs thoughtful consdieration*** Advance care planning meeting  Start time:  115pm  End time:  151p  Total time:  36min    A face to face meeting to discuss advance care planning was held today regarding: HUNTER BRIZUELA  Primary decision maker: Cristal RubénStephanie Manzanares  Alternate/surrogate:  Discussed advance directives including, but not limited to, healthcare proxy and code status.  Plan of care discussed and pt's lack of improvement over the past 4 weeks.  DNR/DNI, withdrawal of life sustaining therapy as well as limiting life prolonging therapy. was discussed. Pts HCP does not feel comfortable withdrawing life support as at times pt will have his eyes open, which she sees as a slight improvement. HCP is considering dnr order.  We discussed a MOLST as an extension of the medical decisions made during the hospital.  Ms Castro will review the MOLST and we will talk again on Monday.   Pts HCP seems to have insight into the lack of improvement in pt but is struggling in terms of decision making moving forward.  Additionally, pts son (HCPs half brother) is not in agreement with HCP, so she feels that she is shouldering this burden on her own. Emotional support provided.   Decision regarding code status:  full code  Documentation completed today:  none  **NB-- if pts HCP decide to make pt DNR, his AICD needs to be considered and either deactived or left on, but it needs thoughtful consdieration***

## 2018-05-05 LAB
ANION GAP SERPL CALC-SCNC: 20 MMOL/L — HIGH (ref 5–17)
ANION GAP SERPL CALC-SCNC: 20 MMOL/L — HIGH (ref 5–17)
BUN SERPL-MCNC: 62 MG/DL — HIGH (ref 7–23)
BUN SERPL-MCNC: 69 MG/DL — HIGH (ref 7–23)
CALCIUM SERPL-MCNC: 8.8 MG/DL — SIGNIFICANT CHANGE UP (ref 8.4–10.5)
CALCIUM SERPL-MCNC: 8.8 MG/DL — SIGNIFICANT CHANGE UP (ref 8.4–10.5)
CHLORIDE SERPL-SCNC: 94 MMOL/L — LOW (ref 96–108)
CHLORIDE SERPL-SCNC: 97 MMOL/L — SIGNIFICANT CHANGE UP (ref 96–108)
CO2 SERPL-SCNC: 21 MMOL/L — LOW (ref 22–31)
CO2 SERPL-SCNC: 23 MMOL/L — SIGNIFICANT CHANGE UP (ref 22–31)
CREAT SERPL-MCNC: 3.71 MG/DL — HIGH (ref 0.5–1.3)
CREAT SERPL-MCNC: 3.86 MG/DL — HIGH (ref 0.5–1.3)
GLUCOSE BLDC GLUCOMTR-MCNC: 121 MG/DL — HIGH (ref 70–99)
GLUCOSE BLDC GLUCOMTR-MCNC: 125 MG/DL — HIGH (ref 70–99)
GLUCOSE BLDC GLUCOMTR-MCNC: 129 MG/DL — HIGH (ref 70–99)
GLUCOSE BLDC GLUCOMTR-MCNC: 208 MG/DL — HIGH (ref 70–99)
GLUCOSE BLDC GLUCOMTR-MCNC: 364 MG/DL — HIGH (ref 70–99)
GLUCOSE BLDC GLUCOMTR-MCNC: 51 MG/DL — LOW (ref 70–99)
GLUCOSE BLDC GLUCOMTR-MCNC: 65 MG/DL — LOW (ref 70–99)
GLUCOSE BLDC GLUCOMTR-MCNC: 98 MG/DL — SIGNIFICANT CHANGE UP (ref 70–99)
GLUCOSE SERPL-MCNC: 193 MG/DL — HIGH (ref 70–99)
GLUCOSE SERPL-MCNC: 78 MG/DL — SIGNIFICANT CHANGE UP (ref 70–99)
HCT VFR BLD CALC: 29.2 % — LOW (ref 39–50)
HGB BLD-MCNC: 8.9 G/DL — LOW (ref 13–17)
INR BLD: 2.4 — HIGH (ref 0.88–1.16)
MAGNESIUM SERPL-MCNC: 2 MG/DL — SIGNIFICANT CHANGE UP (ref 1.6–2.6)
MCHC RBC-ENTMCNC: 28.3 PG — SIGNIFICANT CHANGE UP (ref 27–34)
MCHC RBC-ENTMCNC: 30.5 G/DL — LOW (ref 32–36)
MCV RBC AUTO: 93 FL — SIGNIFICANT CHANGE UP (ref 80–100)
PLATELET # BLD AUTO: 102 K/UL — LOW (ref 150–400)
POTASSIUM SERPL-MCNC: 4.3 MMOL/L — SIGNIFICANT CHANGE UP (ref 3.5–5.3)
POTASSIUM SERPL-MCNC: 4.4 MMOL/L — SIGNIFICANT CHANGE UP (ref 3.5–5.3)
POTASSIUM SERPL-SCNC: 4.3 MMOL/L — SIGNIFICANT CHANGE UP (ref 3.5–5.3)
POTASSIUM SERPL-SCNC: 4.4 MMOL/L — SIGNIFICANT CHANGE UP (ref 3.5–5.3)
PROTHROM AB SERPL-ACNC: 27.1 SEC — HIGH (ref 9.8–12.7)
RBC # BLD: 3.14 M/UL — LOW (ref 4.2–5.8)
RBC # FLD: 16.7 % — SIGNIFICANT CHANGE UP (ref 10.3–16.9)
SODIUM SERPL-SCNC: 137 MMOL/L — SIGNIFICANT CHANGE UP (ref 135–145)
SODIUM SERPL-SCNC: 138 MMOL/L — SIGNIFICANT CHANGE UP (ref 135–145)
WBC # BLD: 13.9 K/UL — HIGH (ref 3.8–10.5)
WBC # FLD AUTO: 13.9 K/UL — HIGH (ref 3.8–10.5)

## 2018-05-05 PROCEDURE — 71045 X-RAY EXAM CHEST 1 VIEW: CPT | Mod: 26

## 2018-05-05 PROCEDURE — 90935 HEMODIALYSIS ONE EVALUATION: CPT | Mod: GC

## 2018-05-05 PROCEDURE — 99233 SBSQ HOSP IP/OBS HIGH 50: CPT | Mod: GC

## 2018-05-05 RX ORDER — DEXTROSE 50 % IN WATER 50 %
25 SYRINGE (ML) INTRAVENOUS ONCE
Qty: 0 | Refills: 0 | Status: COMPLETED | OUTPATIENT
Start: 2018-05-05 | End: 2018-05-05

## 2018-05-05 RX ORDER — DOXERCALCIFEROL 2.5 UG/1
2 CAPSULE ORAL ONCE
Qty: 0 | Refills: 0 | Status: DISCONTINUED | OUTPATIENT
Start: 2018-05-05 | End: 2018-05-05

## 2018-05-05 RX ORDER — INSULIN GLARGINE 100 [IU]/ML
10 INJECTION, SOLUTION SUBCUTANEOUS AT BEDTIME
Qty: 0 | Refills: 0 | Status: DISCONTINUED | OUTPATIENT
Start: 2018-05-05 | End: 2018-05-06

## 2018-05-05 RX ORDER — ALBUMIN HUMAN 25 %
VIAL (ML) INTRAVENOUS
Qty: 0 | Refills: 0 | Status: DISCONTINUED | OUTPATIENT
Start: 2018-05-05 | End: 2018-05-05

## 2018-05-05 RX ORDER — ERYTHROPOIETIN 10000 [IU]/ML
10000 INJECTION, SOLUTION INTRAVENOUS; SUBCUTANEOUS ONCE
Qty: 0 | Refills: 0 | Status: DISCONTINUED | OUTPATIENT
Start: 2018-05-05 | End: 2018-05-05

## 2018-05-05 RX ORDER — ALBUMIN HUMAN 25 %
50 VIAL (ML) INTRAVENOUS ONCE
Qty: 0 | Refills: 0 | Status: DISCONTINUED | OUTPATIENT
Start: 2018-05-05 | End: 2018-05-05

## 2018-05-05 RX ORDER — WARFARIN SODIUM 2.5 MG/1
5 TABLET ORAL ONCE
Qty: 0 | Refills: 0 | Status: COMPLETED | OUTPATIENT
Start: 2018-05-05 | End: 2018-05-05

## 2018-05-05 RX ORDER — METOPROLOL TARTRATE 50 MG
5 TABLET ORAL ONCE
Qty: 0 | Refills: 0 | Status: COMPLETED | OUTPATIENT
Start: 2018-05-05 | End: 2018-05-05

## 2018-05-05 RX ADMIN — LEVETIRACETAM 500 MILLIGRAM(S): 250 TABLET, FILM COATED ORAL at 10:07

## 2018-05-05 RX ADMIN — INSULIN HUMAN 4: 100 INJECTION, SOLUTION SUBCUTANEOUS at 06:11

## 2018-05-05 RX ADMIN — Medication 12 MILLIGRAM(S): at 16:44

## 2018-05-05 RX ADMIN — INSULIN HUMAN 12 UNIT(S): 100 INJECTION, SOLUTION SUBCUTANEOUS at 00:56

## 2018-05-05 RX ADMIN — Medication 1 MILLIGRAM(S): at 14:07

## 2018-05-05 RX ADMIN — MODAFINIL 100 MILLIGRAM(S): 200 TABLET ORAL at 14:07

## 2018-05-05 RX ADMIN — Medication 5 MILLIGRAM(S): at 09:41

## 2018-05-05 RX ADMIN — Medication 12 MILLIGRAM(S): at 09:41

## 2018-05-05 RX ADMIN — PIPERACILLIN AND TAZOBACTAM 200 GRAM(S): 4; .5 INJECTION, POWDER, LYOPHILIZED, FOR SOLUTION INTRAVENOUS at 21:54

## 2018-05-05 RX ADMIN — Medication 25 MILLILITER(S): at 17:26

## 2018-05-05 RX ADMIN — MIDODRINE HYDROCHLORIDE 15 MILLIGRAM(S): 2.5 TABLET ORAL at 16:43

## 2018-05-05 RX ADMIN — Medication 5 MILLIGRAM(S): at 15:28

## 2018-05-05 RX ADMIN — WARFARIN SODIUM 5 MILLIGRAM(S): 2.5 TABLET ORAL at 21:55

## 2018-05-05 RX ADMIN — Medication 1 APPLICATION(S): at 12:02

## 2018-05-05 RX ADMIN — MODAFINIL 100 MILLIGRAM(S): 200 TABLET ORAL at 06:10

## 2018-05-05 RX ADMIN — ESCITALOPRAM OXALATE 5 MILLIGRAM(S): 10 TABLET, FILM COATED ORAL at 14:07

## 2018-05-05 RX ADMIN — INSULIN HUMAN 10: 100 INJECTION, SOLUTION SUBCUTANEOUS at 00:54

## 2018-05-05 RX ADMIN — Medication 5 MILLIGRAM(S): at 21:54

## 2018-05-05 RX ADMIN — Medication 1 TABLET(S): at 14:08

## 2018-05-05 RX ADMIN — MIDODRINE HYDROCHLORIDE 15 MILLIGRAM(S): 2.5 TABLET ORAL at 06:08

## 2018-05-05 RX ADMIN — ATORVASTATIN CALCIUM 80 MILLIGRAM(S): 80 TABLET, FILM COATED ORAL at 21:51

## 2018-05-05 RX ADMIN — CHLORHEXIDINE GLUCONATE 15 MILLILITER(S): 213 SOLUTION TOPICAL at 21:51

## 2018-05-05 RX ADMIN — CHLORHEXIDINE GLUCONATE 15 MILLILITER(S): 213 SOLUTION TOPICAL at 09:42

## 2018-05-05 RX ADMIN — INSULIN HUMAN 12 UNIT(S): 100 INJECTION, SOLUTION SUBCUTANEOUS at 06:12

## 2018-05-05 RX ADMIN — MIDODRINE HYDROCHLORIDE 15 MILLIGRAM(S): 2.5 TABLET ORAL at 22:17

## 2018-05-05 RX ADMIN — Medication 5 MILLIGRAM(S): at 06:08

## 2018-05-05 RX ADMIN — INSULIN GLARGINE 10 UNIT(S): 100 INJECTION, SOLUTION SUBCUTANEOUS at 21:53

## 2018-05-05 RX ADMIN — Medication 25 MILLILITER(S): at 16:34

## 2018-05-05 RX ADMIN — PANTOPRAZOLE SODIUM 40 MILLIGRAM(S): 20 TABLET, DELAYED RELEASE ORAL at 14:08

## 2018-05-05 RX ADMIN — ERGOCALCIFEROL 6000 UNIT(S): 1.25 CAPSULE ORAL at 14:09

## 2018-05-05 RX ADMIN — Medication 100 MILLIGRAM(S): at 14:07

## 2018-05-05 RX ADMIN — INSULIN HUMAN 12 UNIT(S): 100 INJECTION, SOLUTION SUBCUTANEOUS at 12:02

## 2018-05-05 RX ADMIN — PIPERACILLIN AND TAZOBACTAM 200 GRAM(S): 4; .5 INJECTION, POWDER, LYOPHILIZED, FOR SOLUTION INTRAVENOUS at 06:10

## 2018-05-05 RX ADMIN — LEVETIRACETAM 250 MILLIGRAM(S): 250 TABLET, FILM COATED ORAL at 17:24

## 2018-05-05 RX ADMIN — CHLORHEXIDINE GLUCONATE 1 APPLICATION(S): 213 SOLUTION TOPICAL at 06:10

## 2018-05-05 RX ADMIN — Medication 81 MILLIGRAM(S): at 14:08

## 2018-05-05 RX ADMIN — Medication 5 MILLIGRAM(S): at 16:44

## 2018-05-05 RX ADMIN — PIPERACILLIN AND TAZOBACTAM 200 GRAM(S): 4; .5 INJECTION, POWDER, LYOPHILIZED, FOR SOLUTION INTRAVENOUS at 14:25

## 2018-05-05 NOTE — PROGRESS NOTE ADULT - PROBLEM SELECTOR PLAN 7
S/p tracheostomy 4/5. Previous bedside ultrasound notable for b/l pleural effusions and atelectatic lung-Likely related to fluid overload. If clinically worsening, can consider thoracentesis for fluid studies and cultures. Course further complicated by septic shock from aspiration PNA.   - c/w daily CPAP trials, weaning to trach collar as tolerated  - f/u renal for HD for fluid overload. Noted complication of hypotension during dialysis, noted to take off too much. Now with difficulty undergoing HD for tachyacardia.   -c/w zosyn (Day 8/10 day course).

## 2018-05-05 NOTE — PROGRESS NOTE ADULT - PROBLEM SELECTOR PLAN 5
Likely 2/2 phlebotomy and CKD, No bloody output. Prior studies showed Anemia of chronic disease. Hgb at 8.9. Remains stable.   - monitor CBC, transfuse if Hgb <7  -Keep active T&S

## 2018-05-05 NOTE — PROGRESS NOTE ADULT - ATTENDING COMMENTS
Patient seen and examined with house-staff during bedside rounds.  Resident note read, including vitals, physical findings, laboratory data, and radiological reports.   Revisions included below.  Direct personal management at bed side and extensive interpretation of the data.  Plan was outlined and discussed in details with the housestaff.  Decision making of high complexity  Action taken for acute disease activity to reflect the level of care provided:  - medication reconciliation  - review laboratory data  - management of MV and RF  - S/P HD  - continue current management  - No fluid removal with HD

## 2018-05-05 NOTE — PROGRESS NOTE ADULT - SUBJECTIVE AND OBJECTIVE BOX
Seen in the morning,  s/p tracheostomy, poor mental status. Does not follow consistently commands Last HD done on 5/3     Renal service for GILMER – ATN – from cardiogenic shock     Patient seen and examined at bedside.     acetaminophen    Suspension. 650 milliGRAM(s) every 6 hours PRN  acetaminophen  Suppository 650 milliGRAM(s) every 6 hours PRN  albumin human 25% IVPB     albumin human 25% IVPB 50 milliLiter(s) once  albumin human 25% IVPB 50 milliLiter(s) once  aspirin  chewable 81 milliGRAM(s) daily  atorvastatin 80 milliGRAM(s) at bedtime  chlorhexidine 0.12% Liquid 15 milliLiter(s) two times a day  chlorhexidine 2% Cloths 1 Application(s) daily  dextrose 5%. 1000 milliLiter(s) <Continuous>  dextrose 50% Injectable 12.5 Gram(s) once  dextrose 50% Injectable 25 Gram(s) once  dextrose 50% Injectable 25 Gram(s) once  dextrose Gel 1 Dose(s) once PRN  doxercalciferol Injectable 2 MICROGram(s) once  epoetin amy Injectable 35933 Unit(s) once  ergocalciferol Drops 6000 Unit(s) daily  escitalopram 5 milliGRAM(s) daily  folic acid 1 milliGRAM(s) daily  glucagon  Injectable 1 milliGRAM(s) once PRN  insulin glargine Injectable (LANTUS) 30 Unit(s) at bedtime  insulin regular  human corrective regimen sliding scale   every 6 hours  insulin regular  human recombinant 12 Unit(s) every 6 hours  levETIRAcetam  Solution 250 milliGRAM(s) <User Schedule>  levETIRAcetam  Solution 500 milliGRAM(s) every 24 hours  metoclopramide 5 milliGRAM(s) every 6 hours  midodrine 15 milliGRAM(s) every 8 hours  modafinil 100 milliGRAM(s) <User Schedule>  multivitamin 1 Tablet(s) daily  pantoprazole   Suspension 40 milliGRAM(s) daily  piperacillin/tazobactam IVPB. 2.25 Gram(s) every 8 hours  predniSONE   Tablet 12 milliGRAM(s) every 8 hours  silver sulfADIAZINE 1% Cream 1 Application(s) daily  thiamine 100 milliGRAM(s) daily      Allergies    No Known Allergies    Intolerances        T(C): , Max: 37.2 (05-05-18 @ 09:11)  T(F): , Max: 98.9 (05-05-18 @ 09:11)  HR: 118 (05-05-18 @ 12:37)  BP: 109/66 (05-05-18 @ 12:37)  BP(mean): 82 (05-05-18 @ 12:37)  RR: 18 (05-05-18 @ 12:37)  SpO2: 100% (05-05-18 @ 12:37)  Wt(kg): --    05-04 @ 07:01  -  05-05 @ 07:00  --------------------------------------------------------  IN:    Enteral Tube Flush: 350 mL    heparin Infusion: 28 mL    Vital HN: 756 mL  Total IN: 1134 mL    OUT:  Total OUT: 0 mL    Total NET: 1134 mL      Physical exam:   Does not follow commands   Cachectic   S/p tracheostomy   Normal air entry into the lungs, no wheezing, no crackles   RRR, normal s1/s2, no murmurs, rubs or gallops   Abdomen – soft, not tender, not distended   Extremities: no edema, s/p removal of AVG   Perm cath on the right of his chest           LABS:                        8.9    13.9  )-----------( 102      ( 05 May 2018 07:06 )             29.2     05-05    137  |  94<L>  |  69<H>  ----------------------------<  78  4.3   |  23  |  3.86<H>    Ca    8.8      05 May 2018 13:15  Mg     2.0     05-05        PT/INR - ( 05 May 2018 07:07 )   PT: 27.1 sec;   INR: 2.40          PTT - ( 04 May 2018 06:07 )  PTT:77.3 sec          RADIOLOGY & ADDITIONAL STUDIES:

## 2018-05-05 NOTE — PROGRESS NOTE ADULT - PROBLEM SELECTOR PLAN 3
Acute change in mental status likely 2/2 brainstem infarct with component of encephalopathy from infection. 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 Per neurology may have had ischemic event from hypoperfusion. Also showing disc protrusion at C3/C4 level coursing superiorly to the C2/C3 contacting the ventral spinal cord. Per neurology, this may be contributing to weakness, however would not explain change in mental status. Exam limited- minimal responsiveness to verbal painful stimuli. Minimally following commands intermittently. Moving RUE, no LUE movement.  - c/w Modafinil  - No MRI at this time for further prognostication.  -Exam remains notable for lethargy, opens eyes to verbal or painful stimuli. Moving RUE. Rigid LUE, not moving with painful stimuli. Minimal b/l lower extremity withdrawal to pain.    #Seizures- c/w keppra 500 mg qd with extra 250 mg post HD days

## 2018-05-05 NOTE — PROGRESS NOTE ADULT - PROBLEM SELECTOR PLAN 10
VTE: Coumadin  GI PPx: PPI    FULL CODE    F: No IVF in setting of HD.  E: Replete electrolytes with caution in setting of HD.   N:  Restarted on tube feeds with Vital 1.5 Terrell at 63cc/hr    Dispo: Initially admitted to MICU but now on 7LACH for further management of hypotension in setting of shock, acute encephalopathy, bacteremia and acute on chronic respiratory failure. Course complicated by Septic shock 2/2 to aspiration PNA for which patient now off levophed. Now on 7LACH for further management of aspiraton PNA, chronic respiratory failure, hypotension and glucose control. Dispo Pending L-tACH with clinical stabilization.

## 2018-05-05 NOTE — PROGRESS NOTE ADULT - PROBLEM SELECTOR PLAN 4
-	EF – 15%  -	In and outs   -	Daily weight   -	Concentrate the iv fluids please  - please obtain ECHo again - tachycardia during HD - to exclude pericardial effusion

## 2018-05-05 NOTE — PROGRESS NOTE ADULT - PROBLEM SELECTOR PLAN 4
Likely 2/2 to ischemic ATN in setting of sepsis with hypoperfusion (unknown baseline) presented with Cr 3.93 with metabolic derangements. Renal following, remains on HD. Permacath replaced multiple times over course for bacteremia and fungemia. Now with L subclavian HD cath.   - f/u renal recs- Plan for HD today but patient became tachycardiac pending control of HR.  -Remains off albumin.    #Adrenal Insufficiency- previously on fludrocortisone and prednisone. Started on stress dose steroids in setting of acute infection.  -weaning off steroids, with prednisone 12 mg q8h.    #Elevated AG-has fluctuated between 20-25, Bicarb 20-22. Lactate negative, glucose has been well controlled, likely 2/2 uremia in setting of ESRD.   - Monitor BMP

## 2018-05-05 NOTE — PROGRESS NOTE ADULT - PROBLEM SELECTOR PLAN 1
-Due to ATN – from cardiogenic shock – not resolving – on HD   -Last HD done on 5/13  - no HD - unstable   - please consider ECHo for pericardial effusion, also obtain blood cultures  -Electrolytes noted   -Volume status acceptable –    -In and outs   -Daily weights   -Renal diet

## 2018-05-05 NOTE — PROGRESS NOTE ADULT - ATTENDING COMMENTS
not tolerating HD past 2 treatments  discuissed with team check echo r/o effusion and r/o infection  discussed with family poor prognosis

## 2018-05-05 NOTE — PROGRESS NOTE ADULT - SUBJECTIVE AND OBJECTIVE BOX
PROGRESS NOTE    CC: septic/cardiogenic shock, ATN now on HD, CVA  Overnight Events:   Interval History: Opens eyes to verbal/painful stimuli. Minimally following commands. Unable to assess as patient not appropriately answering questions.  ROS: Unable to assess as above.    OBJECTIVE  Vitals:  T(C): 37.2 (05-05-18 @ 09:11), Max: 37.2 (05-05-18 @ 09:11)  HR: 90 (05-05-18 @ 08:54) (84 - 122)  BP: 103/74 (05-05-18 @ 08:40) (97/66 - 121/79)  RR: 18 (05-05-18 @ 08:40) (15 - 20)  SpO2: 100% (05-05-18 @ 08:54) (100% - 100%)  Wt(kg): --  Mode: AC/ CMV (Assist Control/ Continuous Mandatory Ventilation)  RR (machine): 10  TV (machine): 500  FiO2: 40  PEEP: 5  ITime: 1  MAP: 8.4  PIP: 19    I/O:  I&O's Summary    04 May 2018 07:01  -  05 May 2018 07:00  --------------------------------------------------------  IN: 1134 mL / OUT: 0 mL / NET: 1134 mL        PHYSICAL EXAM:  Appearance: NAD. Speaking in full sentences.   HEENT:   Conjunctiva clear b/l. Moist oral mucosa.  Cardiovascular: RRR with no murmurs.  Respiratory: Lungs CTAB.   Gastrointestinal:  Soft, nontender. Non-distended. Non-rigid.	  Extremities: No edema b/l. No erythema b/l. LE WWP b/l.  Vascular: DP 2+ b/l.  Neurologic:  Alert and awake. Moving all extremities. Following commands. Making eye contact.  	  LABS:                        8.9    13.9  )-----------( 102      ( 05 May 2018 07:06 )             29.2     05-05    138  |  97  |  62<H>  ----------------------------<  193<H>  4.4   |  21<L>  |  3.71<H>    Ca    8.8      05 May 2018 07:05  Mg     2.0     05-05      PT/INR - ( 05 May 2018 07:07 )   PT: 27.1 sec;   INR: 2.40          PTT - ( 04 May 2018 06:07 )  PTT:77.3 sec      RADIOLOGY & ADDITIONAL TESTS:  Reviewed .    MEDICATIONS  (STANDING):  albumin human 25% IVPB      albumin human 25% IVPB 50 milliLiter(s) IV Intermittent once  albumin human 25% IVPB 50 milliLiter(s) IV Intermittent once  aspirin  chewable 81 milliGRAM(s) Oral daily  atorvastatin 80 milliGRAM(s) Oral at bedtime  chlorhexidine 0.12% Liquid 15 milliLiter(s) Swish and Spit two times a day  chlorhexidine 2% Cloths 1 Application(s) Topical daily  dextrose 5%. 1000 milliLiter(s) (50 mL/Hr) IV Continuous <Continuous>  dextrose 50% Injectable 12.5 Gram(s) IV Push once  dextrose 50% Injectable 25 Gram(s) IV Push once  dextrose 50% Injectable 25 Gram(s) IV Push once  doxercalciferol Injectable 2 MICROGram(s) IV Push once  epoetin amy Injectable 93598 Unit(s) IV Push once  ergocalciferol Drops 6000 Unit(s) Oral daily  escitalopram 5 milliGRAM(s) Oral daily  folic acid 1 milliGRAM(s) Oral daily  insulin glargine Injectable (LANTUS) 30 Unit(s) SubCutaneous at bedtime  insulin regular  human corrective regimen sliding scale   SubCutaneous every 6 hours  insulin regular  human recombinant 12 Unit(s) SubCutaneous every 6 hours  levETIRAcetam  Solution 250 milliGRAM(s) Oral <User Schedule>  levETIRAcetam  Solution 500 milliGRAM(s) Oral every 24 hours  metoclopramide 5 milliGRAM(s) Oral every 6 hours  midodrine 15 milliGRAM(s) Oral every 8 hours  modafinil 100 milliGRAM(s) Oral <User Schedule>  multivitamin 1 Tablet(s) Oral daily  pantoprazole   Suspension 40 milliGRAM(s) Oral daily  piperacillin/tazobactam IVPB. 2.25 Gram(s) IV Intermittent every 8 hours  predniSONE   Tablet 12 milliGRAM(s) Oral every 8 hours  silver sulfADIAZINE 1% Cream 1 Application(s) Topical daily  thiamine 100 milliGRAM(s) Oral daily    MEDICATIONS  (PRN):  acetaminophen    Suspension. 650 milliGRAM(s) Oral every 6 hours PRN Moderate Pain (4 - 6)  acetaminophen  Suppository 650 milliGRAM(s) Rectal every 6 hours PRN For Temp greater than 38 C (100.4 F)  dextrose Gel 1 Dose(s) Oral once PRN Blood Glucose LESS THAN 70 milliGRAM(s)/deciliter  glucagon  Injectable 1 milliGRAM(s) IntraMuscular once PRN Glucose LESS THAN 70 milligrams/deciliter PROGRESS NOTE    CC: septic/cardiogenic shock, ATN now on HD, CVA  Overnight Events: Feeds changed to glucerna in setting of uncontrolled FS. Noted on tele to have 3 beats of VT. No change in BP also with paroxysmal atrial tachycardia.  Interval History: Opens eyes to verbal/painful stimuli. Minimally following commands. Unable to assess as patient not appropriately answering questions.  ROS: Unable to assess as above.    OBJECTIVE  Vitals:  T(C): 37.2 (05-05-18 @ 09:11), Max: 37.2 (05-05-18 @ 09:11)  HR: 90 (05-05-18 @ 08:54) (84 - 122)  BP: 103/74 (05-05-18 @ 08:40) (97/66 - 121/79)  RR: 18 (05-05-18 @ 08:40) (15 - 20)  SpO2: 100% (05-05-18 @ 08:54) (100% - 100%)  Wt(kg): --  Mode: AC/ CMV (Assist Control/ Continuous Mandatory Ventilation)  RR (machine): 10  TV (machine): 500  FiO2: 40  PEEP: 5  ITime: 1  MAP: 8.4  PIP: 19    I/O:  I&O's Summary    04 May 2018 07:01  -  05 May 2018 07:00  --------------------------------------------------------  IN: 1134 mL / OUT: 0 mL / NET: 1134 mL        PHYSICAL EXAM:  Appearance: NAD. Trach and peg. Lethargic. Minimal responsive. Will open eyes to verbal and painful stimuli.  HEENT:  Conjunctiva clear b/l. Moist oral mucosa.  Cardiovascular: Irregularly irregular.  Respiratory: Lungs with diffuse rhonchi.  Gastrointestinal:  Soft, though increased distension from last exam. + tympany. + bowel sounds.  Extremities: 2+ dependent edema b/l in thighs and 1+ edema in feet. No erythema b/l. LE WWP b/l.  Vascular: DP diminished.  Neurologic:  Lethargic though minimal arousable. Opens eyes to verbal/painful stimuli. Intermittently following commands, squeezing with R hand and moving R upper and moving b/l lower extremity not on command but withdrawal to pain.   	  LABS:                        8.9    13.9  )-----------( 102      ( 05 May 2018 07:06 )             29.2     05-05    138  |  97  |  62<H>  ----------------------------<  193<H>  4.4   |  21<L>  |  3.71<H>    Ca    8.8      05 May 2018 07:05  Mg     2.0     05-05      PT/INR - ( 05 May 2018 07:07 )   PT: 27.1 sec;   INR: 2.40          PTT - ( 04 May 2018 06:07 )  PTT:77.3 sec      RADIOLOGY & ADDITIONAL TESTS:  Reviewed .    MEDICATIONS  (STANDING):  albumin human 25% IVPB      albumin human 25% IVPB 50 milliLiter(s) IV Intermittent once  albumin human 25% IVPB 50 milliLiter(s) IV Intermittent once  aspirin  chewable 81 milliGRAM(s) Oral daily  atorvastatin 80 milliGRAM(s) Oral at bedtime  chlorhexidine 0.12% Liquid 15 milliLiter(s) Swish and Spit two times a day  chlorhexidine 2% Cloths 1 Application(s) Topical daily  dextrose 5%. 1000 milliLiter(s) (50 mL/Hr) IV Continuous <Continuous>  dextrose 50% Injectable 12.5 Gram(s) IV Push once  dextrose 50% Injectable 25 Gram(s) IV Push once  dextrose 50% Injectable 25 Gram(s) IV Push once  doxercalciferol Injectable 2 MICROGram(s) IV Push once  epoetin amy Injectable 04638 Unit(s) IV Push once  ergocalciferol Drops 6000 Unit(s) Oral daily  escitalopram 5 milliGRAM(s) Oral daily  folic acid 1 milliGRAM(s) Oral daily  insulin glargine Injectable (LANTUS) 30 Unit(s) SubCutaneous at bedtime  insulin regular  human corrective regimen sliding scale   SubCutaneous every 6 hours  insulin regular  human recombinant 12 Unit(s) SubCutaneous every 6 hours  levETIRAcetam  Solution 250 milliGRAM(s) Oral <User Schedule>  levETIRAcetam  Solution 500 milliGRAM(s) Oral every 24 hours  metoclopramide 5 milliGRAM(s) Oral every 6 hours  midodrine 15 milliGRAM(s) Oral every 8 hours  modafinil 100 milliGRAM(s) Oral <User Schedule>  multivitamin 1 Tablet(s) Oral daily  pantoprazole   Suspension 40 milliGRAM(s) Oral daily  piperacillin/tazobactam IVPB. 2.25 Gram(s) IV Intermittent every 8 hours  predniSONE   Tablet 12 milliGRAM(s) Oral every 8 hours  silver sulfADIAZINE 1% Cream 1 Application(s) Topical daily  thiamine 100 milliGRAM(s) Oral daily    MEDICATIONS  (PRN):  acetaminophen    Suspension. 650 milliGRAM(s) Oral every 6 hours PRN Moderate Pain (4 - 6)  acetaminophen  Suppository 650 milliGRAM(s) Rectal every 6 hours PRN For Temp greater than 38 C (100.4 F)  dextrose Gel 1 Dose(s) Oral once PRN Blood Glucose LESS THAN 70 milliGRAM(s)/deciliter  glucagon  Injectable 1 milliGRAM(s) IntraMuscular once PRN Glucose LESS THAN 70 milligrams/deciliter PROGRESS NOTE    CC: septic/cardiogenic shock, ATN now on HD, CVA  Overnight Events: Feeds changed to glucerna in setting of uncontrolled FS. Noted on tele to have 3 beats of VT. No change in BP also with paroxysmal atrial tachycardia.  Interval History: Opens eyes to verbal/painful stimuli. Minimally following commands. Unable to assess as patient not appropriately answering questions.  ROS: Unable to assess as above.    OBJECTIVE  Vitals:  T(C): 37.2 (05-05-18 @ 09:11), Max: 37.2 (05-05-18 @ 09:11)  HR: 90 (05-05-18 @ 08:54) (84 - 122)  BP: 103/74 (05-05-18 @ 08:40) (97/66 - 121/79)  RR: 18 (05-05-18 @ 08:40) (15 - 20)  SpO2: 100% (05-05-18 @ 08:54) (100% - 100%)  Wt(kg): --  Mode: AC/ CMV (Assist Control/ Continuous Mandatory Ventilation)  RR (machine): 10  TV (machine): 500  FiO2: 40  PEEP: 5  ITime: 1  MAP: 8.4  PIP: 19    I/O:  I&O's Summary    04 May 2018 07:01  -  05 May 2018 07:00  --------------------------------------------------------  IN: 1134 mL / OUT: 0 mL / NET: 1134 mL        PHYSICAL EXAM:  Appearance: NAD. Trach and peg. Lethargic. Minimal responsive. Will open eyes to verbal and painful stimuli.  HEENT: Noted to be diaphoretic. Conjunctiva clear b/l. Moist oral mucosa.  Cardiovascular: Irregularly irregular. S1, S2 with no murmurs.  Respiratory: Lungs with diffuse rhonchi.  Gastrointestinal:  Soft, though increased distension from last exam. + tympany. + bowel sounds.  Extremities: 2+ dependent edema b/l in thighs and 1+ edema in feet. No erythema b/l. LE WWP b/l.  Vascular: DP diminished.  Neurologic:  Lethargic though minimal arousable. Opens eyes to verbal/painful stimuli. Intermittently following commands, squeezing with R hand and moving R upper and moving b/l lower extremity not on command but withdrawal to pain.   	  LABS:                        8.9    13.9  )-----------( 102      ( 05 May 2018 07:06 )             29.2     05-05    138  |  97  |  62<H>  ----------------------------<  193<H>  4.4   |  21<L>  |  3.71<H>    Ca    8.8      05 May 2018 07:05  Mg     2.0     05-05      PT/INR - ( 05 May 2018 07:07 )   PT: 27.1 sec;   INR: 2.40          PTT - ( 04 May 2018 06:07 )  PTT:77.3 sec      RADIOLOGY & ADDITIONAL TESTS:  Reviewed .    MEDICATIONS  (STANDING):  albumin human 25% IVPB      albumin human 25% IVPB 50 milliLiter(s) IV Intermittent once  albumin human 25% IVPB 50 milliLiter(s) IV Intermittent once  aspirin  chewable 81 milliGRAM(s) Oral daily  atorvastatin 80 milliGRAM(s) Oral at bedtime  chlorhexidine 0.12% Liquid 15 milliLiter(s) Swish and Spit two times a day  chlorhexidine 2% Cloths 1 Application(s) Topical daily  dextrose 5%. 1000 milliLiter(s) (50 mL/Hr) IV Continuous <Continuous>  dextrose 50% Injectable 12.5 Gram(s) IV Push once  dextrose 50% Injectable 25 Gram(s) IV Push once  dextrose 50% Injectable 25 Gram(s) IV Push once  doxercalciferol Injectable 2 MICROGram(s) IV Push once  epoetin amy Injectable 84849 Unit(s) IV Push once  ergocalciferol Drops 6000 Unit(s) Oral daily  escitalopram 5 milliGRAM(s) Oral daily  folic acid 1 milliGRAM(s) Oral daily  insulin glargine Injectable (LANTUS) 30 Unit(s) SubCutaneous at bedtime  insulin regular  human corrective regimen sliding scale   SubCutaneous every 6 hours  insulin regular  human recombinant 12 Unit(s) SubCutaneous every 6 hours  levETIRAcetam  Solution 250 milliGRAM(s) Oral <User Schedule>  levETIRAcetam  Solution 500 milliGRAM(s) Oral every 24 hours  metoclopramide 5 milliGRAM(s) Oral every 6 hours  midodrine 15 milliGRAM(s) Oral every 8 hours  modafinil 100 milliGRAM(s) Oral <User Schedule>  multivitamin 1 Tablet(s) Oral daily  pantoprazole   Suspension 40 milliGRAM(s) Oral daily  piperacillin/tazobactam IVPB. 2.25 Gram(s) IV Intermittent every 8 hours  predniSONE   Tablet 12 milliGRAM(s) Oral every 8 hours  silver sulfADIAZINE 1% Cream 1 Application(s) Topical daily  thiamine 100 milliGRAM(s) Oral daily    MEDICATIONS  (PRN):  acetaminophen    Suspension. 650 milliGRAM(s) Oral every 6 hours PRN Moderate Pain (4 - 6)  acetaminophen  Suppository 650 milliGRAM(s) Rectal every 6 hours PRN For Temp greater than 38 C (100.4 F)  dextrose Gel 1 Dose(s) Oral once PRN Blood Glucose LESS THAN 70 milliGRAM(s)/deciliter  glucagon  Injectable 1 milliGRAM(s) IntraMuscular once PRN Glucose LESS THAN 70 milligrams/deciliter

## 2018-05-05 NOTE — PROGRESS NOTE ADULT - ASSESSMENT
The patient with GILMER – due to ATN – not resolving. – oligo/anuric. Requiring HD. LasT hd done on 5/3. Initially the plan was to proceed with HD but during the HD his heart rate went up to 130 - 140 and the HD was stopped, no UF, The patient was still tachycardic to 130. After the discussion with the primary team was decided not to proceed with HD today.

## 2018-05-05 NOTE — PROGRESS NOTE ADULT - PROBLEM SELECTOR PLAN 1
Course complicated by septic shock 2/2 to aspiration with increasing O2 requirements for which patient was back in MICU. Tx with Zosyn. Resolving septic shock-requiring levophed. No longer on pressors. Weaning steroids  -c/w Zosyn (day 8 of 10 day course).   -WBC continues to downtrend- at 13 today.    #Fungemia  Noted to have Candida Tropicalis growing in blood cultures and completed fluconazole course. Resolved.

## 2018-05-05 NOTE — PROGRESS NOTE ADULT - ASSESSMENT
63M PMH HFrEF 10-15% (ischemic), MI, s/p AICD vs PPM, possible Afib, HTN, DM2 on insulin, possible CKD, and gout, who presented with a chief complaint of generalized weakness s/p septic shock likely 2/2 LE cellulitis complicated by ATN requiring dialysis. Course complicated by AMS likely from brainstem infarct 2/2 LV thrombus and/or toxic metabolic encephalopathy and found to have candidemia and sepsis 2/2 klebsiella bacteremia s/p fluconazole and CTX. Course further complicated by aspiration PNA and currently on Zosyn (8days/10) for plan of 10 days. Patient remains therapeutic on warfarin.

## 2018-05-05 NOTE — PROGRESS NOTE ADULT - PROBLEM SELECTOR PLAN 6
DM2 on Januvia at home. HbA1C 8.6. Has been on Lantus 42U qHS, insulin 7 units q6h. Insulin was decreased to 21U for being NPO but noted to have complication of hypoglycemia.   -FS continuing to rise. Increased Lantus to 30U and Regular insulin 12U q6h.

## 2018-05-05 NOTE — PROGRESS NOTE ADULT - PROBLEM SELECTOR PLAN 9
Hx of Afib and LV thrombus on coumadin prior to admission. Most recent TTE with [facility] shows no LV thrombus currently.   - c/w holding Metoprolol 12.5 q12h given hypotension and HR as been controlled in 80-100s. Will use Dig for rate control if RVR- goal <110  -Now therapeutic on coumadin- INR at 2.4. C/w Coumadin 5 mg for tonight.      #LV thrombus  LV thrombus noted on RUKHSANA on 4/10 for which patient has been on hep gtt. C/w bridge.  -c/w Coumadin as above.

## 2018-05-06 LAB
ANION GAP SERPL CALC-SCNC: 22 MMOL/L — HIGH (ref 5–17)
ANION GAP SERPL CALC-SCNC: 24 MMOL/L — HIGH (ref 5–17)
APTT BLD: 41.1 SEC — HIGH (ref 27.5–37.4)
BLD GP AB SCN SERPL QL: NEGATIVE — SIGNIFICANT CHANGE UP
BUN SERPL-MCNC: 67 MG/DL — HIGH (ref 7–23)
BUN SERPL-MCNC: 76 MG/DL — HIGH (ref 7–23)
CALCIUM SERPL-MCNC: 8.4 MG/DL — SIGNIFICANT CHANGE UP (ref 8.4–10.5)
CALCIUM SERPL-MCNC: 8.6 MG/DL — SIGNIFICANT CHANGE UP (ref 8.4–10.5)
CHLORIDE SERPL-SCNC: 94 MMOL/L — LOW (ref 96–108)
CHLORIDE SERPL-SCNC: 94 MMOL/L — LOW (ref 96–108)
CO2 SERPL-SCNC: 19 MMOL/L — LOW (ref 22–31)
CO2 SERPL-SCNC: 20 MMOL/L — LOW (ref 22–31)
CREAT SERPL-MCNC: 3.98 MG/DL — HIGH (ref 0.5–1.3)
CREAT SERPL-MCNC: 4.14 MG/DL — HIGH (ref 0.5–1.3)
GLUCOSE BLDC GLUCOMTR-MCNC: 128 MG/DL — HIGH (ref 70–99)
GLUCOSE BLDC GLUCOMTR-MCNC: 173 MG/DL — HIGH (ref 70–99)
GLUCOSE BLDC GLUCOMTR-MCNC: 208 MG/DL — HIGH (ref 70–99)
GLUCOSE BLDC GLUCOMTR-MCNC: 214 MG/DL — HIGH (ref 70–99)
GLUCOSE BLDC GLUCOMTR-MCNC: 226 MG/DL — HIGH (ref 70–99)
GLUCOSE BLDC GLUCOMTR-MCNC: 276 MG/DL — HIGH (ref 70–99)
GLUCOSE SERPL-MCNC: 181 MG/DL — HIGH (ref 70–99)
GLUCOSE SERPL-MCNC: 227 MG/DL — HIGH (ref 70–99)
HCT VFR BLD CALC: 32 % — LOW (ref 39–50)
HGB BLD-MCNC: 9.6 G/DL — LOW (ref 13–17)
INR BLD: 2.83 — HIGH (ref 0.88–1.16)
MAGNESIUM SERPL-MCNC: 2 MG/DL — SIGNIFICANT CHANGE UP (ref 1.6–2.6)
MCHC RBC-ENTMCNC: 27.9 PG — SIGNIFICANT CHANGE UP (ref 27–34)
MCHC RBC-ENTMCNC: 30 G/DL — LOW (ref 32–36)
MCV RBC AUTO: 93 FL — SIGNIFICANT CHANGE UP (ref 80–100)
PLATELET # BLD AUTO: 106 K/UL — LOW (ref 150–400)
POTASSIUM SERPL-MCNC: 5.5 MMOL/L — HIGH (ref 3.5–5.3)
POTASSIUM SERPL-MCNC: 5.6 MMOL/L — HIGH (ref 3.5–5.3)
POTASSIUM SERPL-SCNC: 5.5 MMOL/L — HIGH (ref 3.5–5.3)
POTASSIUM SERPL-SCNC: 5.6 MMOL/L — HIGH (ref 3.5–5.3)
PROTHROM AB SERPL-ACNC: 32.1 SEC — HIGH (ref 9.8–12.7)
RBC # BLD: 3.44 M/UL — LOW (ref 4.2–5.8)
RBC # FLD: 17 % — HIGH (ref 10.3–16.9)
RH IG SCN BLD-IMP: POSITIVE — SIGNIFICANT CHANGE UP
SODIUM SERPL-SCNC: 136 MMOL/L — SIGNIFICANT CHANGE UP (ref 135–145)
SODIUM SERPL-SCNC: 137 MMOL/L — SIGNIFICANT CHANGE UP (ref 135–145)
WBC # BLD: 18.6 K/UL — HIGH (ref 3.8–10.5)
WBC # FLD AUTO: 18.6 K/UL — HIGH (ref 3.8–10.5)

## 2018-05-06 PROCEDURE — 99232 SBSQ HOSP IP/OBS MODERATE 35: CPT | Mod: GC

## 2018-05-06 PROCEDURE — 31622 DX BRONCHOSCOPE/WASH: CPT | Mod: GC

## 2018-05-06 PROCEDURE — 99233 SBSQ HOSP IP/OBS HIGH 50: CPT | Mod: 25,GC

## 2018-05-06 RX ORDER — INSULIN GLARGINE 100 [IU]/ML
18 INJECTION, SOLUTION SUBCUTANEOUS AT BEDTIME
Qty: 0 | Refills: 0 | Status: DISCONTINUED | OUTPATIENT
Start: 2018-05-06 | End: 2018-05-06

## 2018-05-06 RX ORDER — SODIUM POLYSTYRENE SULFONATE 4.1 MEQ/G
30 POWDER, FOR SUSPENSION ORAL ONCE
Qty: 0 | Refills: 0 | Status: COMPLETED | OUTPATIENT
Start: 2018-05-06 | End: 2018-05-06

## 2018-05-06 RX ORDER — INSULIN GLARGINE 100 [IU]/ML
18 INJECTION, SOLUTION SUBCUTANEOUS AT BEDTIME
Qty: 0 | Refills: 0 | Status: DISCONTINUED | OUTPATIENT
Start: 2018-05-07 | End: 2018-05-07

## 2018-05-06 RX ORDER — WARFARIN SODIUM 2.5 MG/1
2.5 TABLET ORAL ONCE
Qty: 0 | Refills: 0 | Status: COMPLETED | OUTPATIENT
Start: 2018-05-06 | End: 2018-05-06

## 2018-05-06 RX ORDER — INSULIN GLARGINE 100 [IU]/ML
8 INJECTION, SOLUTION SUBCUTANEOUS ONCE
Qty: 0 | Refills: 0 | Status: COMPLETED | OUTPATIENT
Start: 2018-05-06 | End: 2018-05-06

## 2018-05-06 RX ORDER — CALCIUM GLUCONATE 100 MG/ML
1 VIAL (ML) INTRAVENOUS ONCE
Qty: 0 | Refills: 0 | Status: COMPLETED | OUTPATIENT
Start: 2018-05-06 | End: 2018-05-06

## 2018-05-06 RX ORDER — ALBUTEROL 90 UG/1
2.5 AEROSOL, METERED ORAL ONCE
Qty: 0 | Refills: 0 | Status: COMPLETED | OUTPATIENT
Start: 2018-05-06 | End: 2018-05-06

## 2018-05-06 RX ADMIN — MODAFINIL 100 MILLIGRAM(S): 200 TABLET ORAL at 06:52

## 2018-05-06 RX ADMIN — ESCITALOPRAM OXALATE 5 MILLIGRAM(S): 10 TABLET, FILM COATED ORAL at 11:21

## 2018-05-06 RX ADMIN — INSULIN HUMAN 2: 100 INJECTION, SOLUTION SUBCUTANEOUS at 06:22

## 2018-05-06 RX ADMIN — ALBUTEROL 2.5 MILLIGRAM(S): 90 AEROSOL, METERED ORAL at 21:48

## 2018-05-06 RX ADMIN — ATORVASTATIN CALCIUM 80 MILLIGRAM(S): 80 TABLET, FILM COATED ORAL at 21:49

## 2018-05-06 RX ADMIN — INSULIN HUMAN 4: 100 INJECTION, SOLUTION SUBCUTANEOUS at 11:55

## 2018-05-06 RX ADMIN — SODIUM POLYSTYRENE SULFONATE 30 GRAM(S): 4.1 POWDER, FOR SUSPENSION ORAL at 13:35

## 2018-05-06 RX ADMIN — LEVETIRACETAM 500 MILLIGRAM(S): 250 TABLET, FILM COATED ORAL at 11:20

## 2018-05-06 RX ADMIN — PIPERACILLIN AND TAZOBACTAM 200 GRAM(S): 4; .5 INJECTION, POWDER, LYOPHILIZED, FOR SOLUTION INTRAVENOUS at 05:13

## 2018-05-06 RX ADMIN — Medication 15 MILLIGRAM(S): at 16:21

## 2018-05-06 RX ADMIN — Medication 1 APPLICATION(S): at 11:23

## 2018-05-06 RX ADMIN — CHLORHEXIDINE GLUCONATE 1 APPLICATION(S): 213 SOLUTION TOPICAL at 05:15

## 2018-05-06 RX ADMIN — Medication 1 TABLET(S): at 11:21

## 2018-05-06 RX ADMIN — Medication 5 MILLIGRAM(S): at 16:21

## 2018-05-06 RX ADMIN — MODAFINIL 100 MILLIGRAM(S): 200 TABLET ORAL at 13:35

## 2018-05-06 RX ADMIN — PIPERACILLIN AND TAZOBACTAM 200 GRAM(S): 4; .5 INJECTION, POWDER, LYOPHILIZED, FOR SOLUTION INTRAVENOUS at 13:35

## 2018-05-06 RX ADMIN — PIPERACILLIN AND TAZOBACTAM 200 GRAM(S): 4; .5 INJECTION, POWDER, LYOPHILIZED, FOR SOLUTION INTRAVENOUS at 22:03

## 2018-05-06 RX ADMIN — Medication 200 GRAM(S): at 22:02

## 2018-05-06 RX ADMIN — Medication 5 MILLIGRAM(S): at 11:20

## 2018-05-06 RX ADMIN — Medication 5 MILLIGRAM(S): at 04:22

## 2018-05-06 RX ADMIN — Medication 81 MILLIGRAM(S): at 11:21

## 2018-05-06 RX ADMIN — WARFARIN SODIUM 2.5 MILLIGRAM(S): 2.5 TABLET ORAL at 21:49

## 2018-05-06 RX ADMIN — Medication 15 MILLIGRAM(S): at 00:29

## 2018-05-06 RX ADMIN — Medication 5 MILLIGRAM(S): at 21:49

## 2018-05-06 RX ADMIN — SODIUM POLYSTYRENE SULFONATE 30 GRAM(S): 4.1 POWDER, FOR SUSPENSION ORAL at 21:46

## 2018-05-06 RX ADMIN — MIDODRINE HYDROCHLORIDE 15 MILLIGRAM(S): 2.5 TABLET ORAL at 16:21

## 2018-05-06 RX ADMIN — INSULIN GLARGINE 10 UNIT(S): 100 INJECTION, SOLUTION SUBCUTANEOUS at 21:47

## 2018-05-06 RX ADMIN — INSULIN HUMAN 4: 100 INJECTION, SOLUTION SUBCUTANEOUS at 17:46

## 2018-05-06 RX ADMIN — PANTOPRAZOLE SODIUM 40 MILLIGRAM(S): 20 TABLET, DELAYED RELEASE ORAL at 11:21

## 2018-05-06 RX ADMIN — ERGOCALCIFEROL 6000 UNIT(S): 1.25 CAPSULE ORAL at 11:22

## 2018-05-06 RX ADMIN — Medication 15 MILLIGRAM(S): at 07:00

## 2018-05-06 RX ADMIN — MIDODRINE HYDROCHLORIDE 15 MILLIGRAM(S): 2.5 TABLET ORAL at 22:03

## 2018-05-06 RX ADMIN — CHLORHEXIDINE GLUCONATE 15 MILLILITER(S): 213 SOLUTION TOPICAL at 21:47

## 2018-05-06 RX ADMIN — INSULIN GLARGINE 8 UNIT(S): 100 INJECTION, SOLUTION SUBCUTANEOUS at 22:45

## 2018-05-06 RX ADMIN — Medication 100 MILLIGRAM(S): at 11:20

## 2018-05-06 RX ADMIN — CHLORHEXIDINE GLUCONATE 15 MILLILITER(S): 213 SOLUTION TOPICAL at 11:20

## 2018-05-06 RX ADMIN — MIDODRINE HYDROCHLORIDE 15 MILLIGRAM(S): 2.5 TABLET ORAL at 06:52

## 2018-05-06 RX ADMIN — Medication 1 MILLIGRAM(S): at 11:20

## 2018-05-06 NOTE — PROGRESS NOTE ADULT - ATTENDING COMMENTS
Patient seen and examined with house-staff during bedside rounds.  Resident note read, including vitals, physical findings, laboratory data, and radiological reports.   Revisions included below.  Direct personal management at bed side and extensive interpretation of the data.  Plan was outlined and discussed in details with the housestaff.  Decision making of high complexity  Action taken for acute disease activity to reflect the level of care provided:  - medication reconciliation  - review laboratory data  - management of RF and MV  - Tolerating PSV  - discussed with renal and will start kayaxalate  - BS is controlled and insulin adjusted  - prognosis poor  - No change in hemodynamics

## 2018-05-06 NOTE — PROGRESS NOTE ADULT - SUBJECTIVE AND OBJECTIVE BOX
OVERNIGHT EVENTS:    SUBJECTIVE / INTERVAL HPI: Patient seen and examined at bedside.     VITAL SIGNS:  Vital Signs Last 24 Hrs  T(C): 36.8 (06 May 2018 05:00), Max: 37.3 (05 May 2018 14:20)  T(F): 98.3 (06 May 2018 05:00), Max: 99.1 (05 May 2018 14:20)  HR: 103 (06 May 2018 06:15) (90 - 120)  BP: 111/70 (06 May 2018 05:49) (85/59 - 111/70)  BP(mean): 67 (05 May 2018 20:20) (67 - 84)  RR: 17 (06 May 2018 06:15) (11 - 18)  SpO2: 100% (06 May 2018 06:15) (99% - 100%)    PHYSICAL EXAM:    General: WDWN  HEENT: NC/AT; PERRL, anicteric sclera; MMM  Neck: supple  Cardiovascular: +S1/S2; RRR  Respiratory: CTA B/L; no W/R/R  Gastrointestinal: soft, NT/ND; +BSx4  Extremities: WWP; no edema, clubbing or cyanosis  Vascular: 2+ radial, DP/PT pulses B/L  Neurological: AAOx3; no focal deficits    MEDICATIONS:  MEDICATIONS  (STANDING):  aspirin  chewable 81 milliGRAM(s) Oral daily  atorvastatin 80 milliGRAM(s) Oral at bedtime  chlorhexidine 0.12% Liquid 15 milliLiter(s) Swish and Spit two times a day  chlorhexidine 2% Cloths 1 Application(s) Topical daily  dextrose 5%. 1000 milliLiter(s) (50 mL/Hr) IV Continuous <Continuous>  dextrose 50% Injectable 12.5 Gram(s) IV Push once  dextrose 50% Injectable 25 Gram(s) IV Push once  dextrose 50% Injectable 25 Gram(s) IV Push once  ergocalciferol Drops 6000 Unit(s) Oral daily  escitalopram 5 milliGRAM(s) Oral daily  folic acid 1 milliGRAM(s) Oral daily  insulin glargine Injectable (LANTUS) 10 Unit(s) SubCutaneous at bedtime  insulin regular  human corrective regimen sliding scale   SubCutaneous every 6 hours  levETIRAcetam  Solution 250 milliGRAM(s) Oral <User Schedule>  levETIRAcetam  Solution 500 milliGRAM(s) Oral every 24 hours  metoclopramide 5 milliGRAM(s) Oral every 6 hours  midodrine 15 milliGRAM(s) Oral every 8 hours  modafinil 100 milliGRAM(s) Oral <User Schedule>  multivitamin 1 Tablet(s) Oral daily  pantoprazole   Suspension 40 milliGRAM(s) Oral daily  piperacillin/tazobactam IVPB. 2.25 Gram(s) IV Intermittent every 8 hours  predniSONE   Tablet 15 milliGRAM(s) Oral every 8 hours  silver sulfADIAZINE 1% Cream 1 Application(s) Topical daily  thiamine 100 milliGRAM(s) Oral daily    MEDICATIONS  (PRN):  acetaminophen    Suspension. 650 milliGRAM(s) Oral every 6 hours PRN Moderate Pain (4 - 6)  acetaminophen  Suppository 650 milliGRAM(s) Rectal every 6 hours PRN For Temp greater than 38 C (100.4 F)  dextrose Gel 1 Dose(s) Oral once PRN Blood Glucose LESS THAN 70 milliGRAM(s)/deciliter  glucagon  Injectable 1 milliGRAM(s) IntraMuscular once PRN Glucose LESS THAN 70 milligrams/deciliter      ALLERGIES:  Allergies    No Known Allergies    Intolerances        LABS:                        8.9    13.9  )-----------( 102      ( 05 May 2018 07:06 )             29.2     05-05    137  |  94<L>  |  69<H>  ----------------------------<  78  4.3   |  23  |  3.86<H>    Ca    8.8      05 May 2018 13:15  Mg     2.0     05-05      PT/INR - ( 05 May 2018 07:07 )   PT: 27.1 sec;   INR: 2.40              CAPILLARY BLOOD GLUCOSE      POCT Blood Glucose.: 173 mg/dL (06 May 2018 06:10)          RADIOLOGY & ADDITIONAL TESTS: Reviewed.      ASSESSMENT:    PLAN: OVERNIGHT EVENTS:   TELE:   SUBJECTIVE / INTERVAL HPI: Patient seen and examined at bedside.     VITAL SIGNS:  Vital Signs Last 24 Hrs  T(C): 36.8 (06 May 2018 05:00), Max: 37.3 (05 May 2018 14:20)  T(F): 98.3 (06 May 2018 05:00), Max: 99.1 (05 May 2018 14:20)  HR: 103 (06 May 2018 06:15) (90 - 120)  BP: 111/70 (06 May 2018 05:49) (85/59 - 111/70)  BP(mean): 67 (05 May 2018 20:20) (67 - 84)  RR: 17 (06 May 2018 06:15) (11 - 18)  SpO2: 100% (06 May 2018 06:15) (99% - 100%)    PHYSICAL EXAM:  General: WDWN  HEENT: NC/AT; PERRL, anicteric sclera; MMM  Neck: supple  Cardiovascular: +S1/S2; RRR  Respiratory: CTA B/L; no W/R/R  Gastrointestinal: soft, NT/ND; +BSx4  Extremities: WWP; no edema, clubbing or cyanosis  Vascular: 2+ radial, DP/PT pulses B/L  Neurological: AAOx3; no focal deficits    MEDICATIONS:  MEDICATIONS  (STANDING):  aspirin  chewable 81 milliGRAM(s) Oral daily  atorvastatin 80 milliGRAM(s) Oral at bedtime  chlorhexidine 0.12% Liquid 15 milliLiter(s) Swish and Spit two times a day  chlorhexidine 2% Cloths 1 Application(s) Topical daily  dextrose 5%. 1000 milliLiter(s) (50 mL/Hr) IV Continuous <Continuous>  dextrose 50% Injectable 12.5 Gram(s) IV Push once  dextrose 50% Injectable 25 Gram(s) IV Push once  dextrose 50% Injectable 25 Gram(s) IV Push once  ergocalciferol Drops 6000 Unit(s) Oral daily  escitalopram 5 milliGRAM(s) Oral daily  folic acid 1 milliGRAM(s) Oral daily  insulin glargine Injectable (LANTUS) 10 Unit(s) SubCutaneous at bedtime  insulin regular  human corrective regimen sliding scale   SubCutaneous every 6 hours  levETIRAcetam  Solution 250 milliGRAM(s) Oral <User Schedule>  levETIRAcetam  Solution 500 milliGRAM(s) Oral every 24 hours  metoclopramide 5 milliGRAM(s) Oral every 6 hours  midodrine 15 milliGRAM(s) Oral every 8 hours  modafinil 100 milliGRAM(s) Oral <User Schedule>  multivitamin 1 Tablet(s) Oral daily  pantoprazole   Suspension 40 milliGRAM(s) Oral daily  piperacillin/tazobactam IVPB. 2.25 Gram(s) IV Intermittent every 8 hours  predniSONE   Tablet 15 milliGRAM(s) Oral every 8 hours  silver sulfADIAZINE 1% Cream 1 Application(s) Topical daily  thiamine 100 milliGRAM(s) Oral daily    MEDICATIONS  (PRN):  acetaminophen    Suspension. 650 milliGRAM(s) Oral every 6 hours PRN Moderate Pain (4 - 6)  acetaminophen  Suppository 650 milliGRAM(s) Rectal every 6 hours PRN For Temp greater than 38 C (100.4 F)  dextrose Gel 1 Dose(s) Oral once PRN Blood Glucose LESS THAN 70 milliGRAM(s)/deciliter  glucagon  Injectable 1 milliGRAM(s) IntraMuscular once PRN Glucose LESS THAN 70 milligrams/deciliter      ALLERGIES:  No Known Allergies    LABS:                        8.9    13.9  )-----------( 102      ( 05 May 2018 07:06 )             29.2     05-05    137  |  94<L>  |  69<H>  ----------------------------<  78  4.3   |  23  |  3.86<H>    Ca    8.8      05 May 2018 13:15  Mg     2.0     05-05      PT/INR - ( 05 May 2018 07:07 )   PT: 27.1 sec;   INR: 2.40       CAPILLARY BLOOD GLUCOSE  POCT Blood Glucose.: 173 mg/dL (06 May 2018 06:10)      RADIOLOGY & ADDITIONAL TESTS: Reviewed. OVERNIGHT EVENTS: No overnight events.   TELE: couplets, 4 beats of VT x 2 episodes.  SUBJECTIVE / INTERVAL HPI: Patient seen and examined at bedside. Patient is responsive to deep pain stimulus, moves extremities, does not follow commands. Does not appear to be in pain.    VITAL SIGNS:  Vital Signs Last 24 Hrs  T(C): 36.8 (06 May 2018 05:00), Max: 37.3 (05 May 2018 14:20)  T(F): 98.3 (06 May 2018 05:00), Max: 99.1 (05 May 2018 14:20)  HR: 103 (06 May 2018 06:15) (90 - 120)  BP: 111/70 (06 May 2018 05:49) (85/59 - 111/70)  BP(mean): 67 (05 May 2018 20:20) (67 - 84)  RR: 17 (06 May 2018 06:15) (11 - 18)  SpO2: 100% (06 May 2018 06:15) (99% - 100%)    PHYSICAL EXAM:  General: WDWN  HEENT: NC/AT; PERRL, anicteric sclera; MMM  Neck: supple  Cardiovascular: +S1/S2; RRR  Respiratory: CTA B/L; no W/R/R  Gastrointestinal: soft, NT/ND; +BSx4  Extremities: WWP; no edema, clubbing or cyanosis  Vascular: 2+ radial, DP/PT pulses B/L  Neurological: AAOx3; no focal deficits    MEDICATIONS:  MEDICATIONS  (STANDING):  aspirin  chewable 81 milliGRAM(s) Oral daily  atorvastatin 80 milliGRAM(s) Oral at bedtime  chlorhexidine 0.12% Liquid 15 milliLiter(s) Swish and Spit two times a day  chlorhexidine 2% Cloths 1 Application(s) Topical daily  dextrose 5%. 1000 milliLiter(s) (50 mL/Hr) IV Continuous <Continuous>  dextrose 50% Injectable 12.5 Gram(s) IV Push once  dextrose 50% Injectable 25 Gram(s) IV Push once  dextrose 50% Injectable 25 Gram(s) IV Push once  ergocalciferol Drops 6000 Unit(s) Oral daily  escitalopram 5 milliGRAM(s) Oral daily  folic acid 1 milliGRAM(s) Oral daily  insulin glargine Injectable (LANTUS) 10 Unit(s) SubCutaneous at bedtime  insulin regular  human corrective regimen sliding scale   SubCutaneous every 6 hours  levETIRAcetam  Solution 250 milliGRAM(s) Oral <User Schedule>  levETIRAcetam  Solution 500 milliGRAM(s) Oral every 24 hours  metoclopramide 5 milliGRAM(s) Oral every 6 hours  midodrine 15 milliGRAM(s) Oral every 8 hours  modafinil 100 milliGRAM(s) Oral <User Schedule>  multivitamin 1 Tablet(s) Oral daily  pantoprazole   Suspension 40 milliGRAM(s) Oral daily  piperacillin/tazobactam IVPB. 2.25 Gram(s) IV Intermittent every 8 hours  predniSONE   Tablet 15 milliGRAM(s) Oral every 8 hours  silver sulfADIAZINE 1% Cream 1 Application(s) Topical daily  thiamine 100 milliGRAM(s) Oral daily    MEDICATIONS  (PRN):  acetaminophen    Suspension. 650 milliGRAM(s) Oral every 6 hours PRN Moderate Pain (4 - 6)  acetaminophen  Suppository 650 milliGRAM(s) Rectal every 6 hours PRN For Temp greater than 38 C (100.4 F)  dextrose Gel 1 Dose(s) Oral once PRN Blood Glucose LESS THAN 70 milliGRAM(s)/deciliter  glucagon  Injectable 1 milliGRAM(s) IntraMuscular once PRN Glucose LESS THAN 70 milligrams/deciliter      ALLERGIES:  No Known Allergies    LABS:                        8.9    13.9  )-----------( 102      ( 05 May 2018 07:06 )             29.2     05-05    137  |  94<L>  |  69<H>  ----------------------------<  78  4.3   |  23  |  3.86<H>    Ca    8.8      05 May 2018 13:15  Mg     2.0     05-05      PT/INR - ( 05 May 2018 07:07 )   PT: 27.1 sec;   INR: 2.40       CAPILLARY BLOOD GLUCOSE  POCT Blood Glucose.: 173 mg/dL (06 May 2018 06:10)      RADIOLOGY & ADDITIONAL TESTS: Reviewed. OVERNIGHT EVENTS: No overnight events.   TELE: couplets, 4 beats of VT x 2 episodes.  SUBJECTIVE / INTERVAL HPI: Patient seen and examined at bedside. Patient is responsive to deep pain stimulus, moves extremities, does not follow commands. Does not appear to be in pain.    VITAL SIGNS:  Vital Signs Last 24 Hrs  T(C): 36.8 (06 May 2018 05:00), Max: 37.3 (05 May 2018 14:20)  T(F): 98.3 (06 May 2018 05:00), Max: 99.1 (05 May 2018 14:20)  HR: 103 (06 May 2018 06:15) (90 - 120)  BP: 111/70 (06 May 2018 05:49) (85/59 - 111/70)  BP(mean): 67 (05 May 2018 20:20) (67 - 84)  RR: 17 (06 May 2018 06:15) (11 - 18)  SpO2: 100% (06 May 2018 06:15) (99% - 100%)    PHYSICAL EXAM:  General: NAD, thin with bilateral temporal wasting, trach and PEG in place, no accessory muscle use or discernable distress  HEENT: NC/AT; PERRL, anicteric sclera; MMM  Neck: supple, trach in place  Cardiovascular: +S1/S2; RRR  Respiratory: course sounds on lateral and anterior chest, breathing well on vent set to  VC-AC; 40/500/5/10  Gastrointestinal: soft, NT/ND; +BSx4, PEG in place  : barnes in place  Extremities: WWP; no edema, clubbing or cyanosis, SCDs in place, left arm wrapped - c/d/i  Vascular: 2+ radial pulses bilaterally, 1+ pulses BL/LE DP/PT  Neurological: arousable to deep pain stimulus    Lines: HD catheter in place (5/4), L IJ in place (5/4)    MEDICATIONS:  MEDICATIONS  (STANDING):  aspirin  chewable 81 milliGRAM(s) Oral daily  atorvastatin 80 milliGRAM(s) Oral at bedtime  chlorhexidine 0.12% Liquid 15 milliLiter(s) Swish and Spit two times a day  chlorhexidine 2% Cloths 1 Application(s) Topical daily  dextrose 5%. 1000 milliLiter(s) (50 mL/Hr) IV Continuous <Continuous>  dextrose 50% Injectable 12.5 Gram(s) IV Push once  dextrose 50% Injectable 25 Gram(s) IV Push once  dextrose 50% Injectable 25 Gram(s) IV Push once  ergocalciferol Drops 6000 Unit(s) Oral daily  escitalopram 5 milliGRAM(s) Oral daily  folic acid 1 milliGRAM(s) Oral daily  insulin glargine Injectable (LANTUS) 10 Unit(s) SubCutaneous at bedtime  insulin regular  human corrective regimen sliding scale   SubCutaneous every 6 hours  levETIRAcetam  Solution 250 milliGRAM(s) Oral <User Schedule>  levETIRAcetam  Solution 500 milliGRAM(s) Oral every 24 hours  metoclopramide 5 milliGRAM(s) Oral every 6 hours  midodrine 15 milliGRAM(s) Oral every 8 hours  modafinil 100 milliGRAM(s) Oral <User Schedule>  multivitamin 1 Tablet(s) Oral daily  pantoprazole   Suspension 40 milliGRAM(s) Oral daily  piperacillin/tazobactam IVPB. 2.25 Gram(s) IV Intermittent every 8 hours  predniSONE   Tablet 15 milliGRAM(s) Oral every 8 hours  silver sulfADIAZINE 1% Cream 1 Application(s) Topical daily  thiamine 100 milliGRAM(s) Oral daily    MEDICATIONS  (PRN):  acetaminophen    Suspension. 650 milliGRAM(s) Oral every 6 hours PRN Moderate Pain (4 - 6)  acetaminophen  Suppository 650 milliGRAM(s) Rectal every 6 hours PRN For Temp greater than 38 C (100.4 F)  dextrose Gel 1 Dose(s) Oral once PRN Blood Glucose LESS THAN 70 milliGRAM(s)/deciliter  glucagon  Injectable 1 milliGRAM(s) IntraMuscular once PRN Glucose LESS THAN 70 milligrams/deciliter      ALLERGIES:  No Known Allergies    LABS:                        8.9    13.9  )-----------( 102      ( 05 May 2018 07:06 )             29.2     05-05    137  |  94<L>  |  69<H>  ----------------------------<  78  4.3   |  23  |  3.86<H>    Ca    8.8      05 May 2018 13:15  Mg     2.0     05-05      PT/INR - ( 05 May 2018 07:07 )   PT: 27.1 sec;   INR: 2.40       CAPILLARY BLOOD GLUCOSE  POCT Blood Glucose.: 173 mg/dL (06 May 2018 06:10)      RADIOLOGY & ADDITIONAL TESTS: Reviewed.

## 2018-05-06 NOTE — PROGRESS NOTE ADULT - PROBLEM SELECTOR PLAN 9
Hx of Afib and LV thrombus on coumadin prior to admission. Most recent TTE with [facility] shows no LV thrombus currently.   - c/w holding Metoprolol 12.5 q12h given hypotension and HR as been controlled in 80-100s. Will use Dig for rate control if RVR- goal <110  -Now therapeutic on coumadin- INR at 2.4. C/w Coumadin 5 mg for tonight.      #LV thrombus  LV thrombus noted on RUKHSANA on 4/10 for which patient has been on hep gtt. C/w bridge.  -c/w Coumadin as above. Hx of Afib and LV thrombus on coumadin prior to admission. Most recent TTE with [facility] shows no LV thrombus currently.   - c/w holding Metoprolol 12.5 q12h given hypotension and HR as been controlled in 80-100s. Will use Dig for rate control if RVR- goal <110  -Now therapeutic on coumadin- INR at 2.4. C/w 2.5 coumadin.      #LV thrombus  LV thrombus noted on RUKHSANA on 4/10 for which patient has been on hep gtt. C/w bridge.  -c/w Coumadin as above.

## 2018-05-06 NOTE — PROGRESS NOTE ADULT - PROBLEM SELECTOR PLAN 5
Likely 2/2 phlebotomy and CKD, No bloody output. Prior studies showed Anemia of chronic disease. Hgb at 8.9. Remains stable.   - monitor CBC, transfuse if Hgb <7  -Keep active T&S Likely 2/2 phlebotomy and CKD, No bloody output. Prior studies showed Anemia of chronic disease. Hgb at 9.6. Remains stable.   - monitor CBC, transfuse if Hgb <7  -Keep active T&S

## 2018-05-06 NOTE — PROGRESS NOTE ADULT - PROBLEM SELECTOR PLAN 7
S/p tracheostomy 4/5. Previous bedside ultrasound notable for b/l pleural effusions and atelectatic lung-Likely related to fluid overload. If clinically worsening, can consider thoracentesis for fluid studies and cultures. Course further complicated by septic shock from aspiration PNA.   - c/w daily CPAP trials, weaning to trach collar as tolerated  - f/u renal for HD for fluid overload. Noted complication of hypotension during dialysis, noted to take off too much. Now with difficulty undergoing HD for tachyacardia.   -c/w zosyn (Day 8/10 day course). S/p tracheostomy 4/5. Previous bedside ultrasound notable for b/l pleural effusions and atelectatic lung-Likely related to fluid overload. If clinically worsening, can consider thoracentesis for fluid studies and cultures. Course further complicated by septic shock from aspiration PNA.   - c/w daily CPAP trials, weaning to trach collar as tolerated  - HD tomorrow for fluid overload  - c/w zosyn (Day 9/10 day course).

## 2018-05-06 NOTE — PROGRESS NOTE ADULT - PROBLEM SELECTOR PLAN 4
-	EF – 15%  -	In and outs   -	Daily weight   - please obtain ECHo again - tachycardia during HD - to exclude pericardial effusion

## 2018-05-06 NOTE — PROGRESS NOTE ADULT - ATTENDING COMMENTS
pt has not been tolerating HD treatments-- r/o reversible causes incl infxn, pericardail effusion-- (? pneumonia on CXR?)   rx k with kayexelate and follow   camille re-attempt HD tomorrow   prognosis poor overall

## 2018-05-06 NOTE — PROGRESS NOTE ADULT - PROBLEM SELECTOR PLAN 1
Course complicated by septic shock 2/2 to aspiration with increasing O2 requirements for which patient was back in MICU. Tx with Zosyn. Resolving septic shock-requiring levophed. No longer on pressors. Weaning steroids  -c/w Zosyn (day 8 of 10 day course).   -WBC continues to downtrend- at 13 today.    #Fungemia  Noted to have Candida Tropicalis growing in blood cultures and completed fluconazole course. Resolved. Course complicated by septic shock 2/2 to aspiration with increasing O2 requirements for which patient was back in MICU. Tx with Zosyn. Resolving septic shock-requiring levophed. No longer on pressors. Weaning steroids  -c/w Zosyn (day 8 of 10 day course).   -WBC intermittently spikes, back to 18 from 13 today    #Fungemia  Noted to have Candida Tropicalis growing in blood cultures and completed fluconazole course. Resolved.

## 2018-05-06 NOTE — PROGRESS NOTE ADULT - PROBLEM SELECTOR PLAN 4
Likely 2/2 to ischemic ATN in setting of sepsis with hypoperfusion (unknown baseline) presented with Cr 3.93 with metabolic derangements. Renal following, remains on HD. Permacath replaced multiple times over course for bacteremia and fungemia. Now with L subclavian HD cath.   - f/u renal recs- Plan for HD today but patient became tachycardiac pending control of HR.  -Remains off albumin.    #Adrenal Insufficiency- previously on fludrocortisone and prednisone. Started on stress dose steroids in setting of acute infection.  -weaning off steroids, with prednisone 12 mg q8h.    #Elevated AG-has fluctuated between 20-25, Bicarb 20-22. Lactate negative, glucose has been well controlled, likely 2/2 uremia in setting of ESRD.   - Monitor BMP Likely 2/2 to ischemic ATN in setting of sepsis with hypoperfusion (unknown baseline) presented with Cr 3.93 with metabolic derangements. Renal following, remains on HD. Permacath replaced multiple times over course for bacteremia and fungemia. Now with L subclavian HD cath.   - f/u renal recs- Plan for HD today but plan for tomorrow.  - kayexalate for elevated K to 5.6, monitor electrolytes  - Remains off albumin.    #Adrenal Insufficiency- previously on fludrocortisone and prednisone. Started on stress dose steroids in setting of acute infection.  -weaning off steroids, with prednisone 15 mg q8h.    #Elevated AG-has fluctuated between 20-25, Bicarb 20-22. Lactate negative, glucose has been well controlled, likely 2/2 uremia in setting of ESRD.   - Monitor BMP

## 2018-05-06 NOTE — PROGRESS NOTE ADULT - SUBJECTIVE AND OBJECTIVE BOX
Seen in the morning,  s/p tracheostomy, poor mental status. Does not follow consistently commands Last HD done on 5/5 - we tried yesterday HD - unstable tachycardic to 130 and drop of BP, so the hD was stopped  Renal service for GILMER – ATN – from cardiogenic shock        acetaminophen    Suspension. 650 milliGRAM(s) every 6 hours PRN  acetaminophen  Suppository 650 milliGRAM(s) every 6 hours PRN  aspirin  chewable 81 milliGRAM(s) daily  atorvastatin 80 milliGRAM(s) at bedtime  chlorhexidine 0.12% Liquid 15 milliLiter(s) two times a day  chlorhexidine 2% Cloths 1 Application(s) daily  dextrose 5%. 1000 milliLiter(s) <Continuous>  dextrose 50% Injectable 12.5 Gram(s) once  dextrose 50% Injectable 25 Gram(s) once  dextrose 50% Injectable 25 Gram(s) once  dextrose Gel 1 Dose(s) once PRN  ergocalciferol Drops 6000 Unit(s) daily  escitalopram 5 milliGRAM(s) daily  folic acid 1 milliGRAM(s) daily  glucagon  Injectable 1 milliGRAM(s) once PRN  insulin glargine Injectable (LANTUS) 10 Unit(s) at bedtime  insulin regular  human corrective regimen sliding scale   every 6 hours  levETIRAcetam  Solution 250 milliGRAM(s) <User Schedule>  levETIRAcetam  Solution 500 milliGRAM(s) every 24 hours  metoclopramide 5 milliGRAM(s) every 6 hours  midodrine 15 milliGRAM(s) every 8 hours  modafinil 100 milliGRAM(s) <User Schedule>  multivitamin 1 Tablet(s) daily  pantoprazole   Suspension 40 milliGRAM(s) daily  piperacillin/tazobactam IVPB. 2.25 Gram(s) every 8 hours  predniSONE   Tablet 15 milliGRAM(s) every 8 hours  silver sulfADIAZINE 1% Cream 1 Application(s) daily  thiamine 100 milliGRAM(s) daily      Allergies    No Known Allergies    Intolerances        T(C): , Max: 37.3 (05-05-18 @ 14:20)  T(F): , Max: 99.1 (05-05-18 @ 14:20)  HR: 100 (05-06-18 @ 08:30)  BP: 100/75 (05-06-18 @ 08:25)  BP(mean): 84 (05-06-18 @ 08:25)  RR: 18 (05-06-18 @ 08:25)  SpO2: 100% (05-06-18 @ 08:30)  Wt(kg): --    05-05 @ 07:01  -  05-06 @ 07:00  --------------------------------------------------------  IN:    Enteral Tube Flush: 100 mL    Solution: 300 mL    Vital HN: 600 mL  Total IN: 1000 mL    OUT:  Total OUT: 0 mL    Total NET: 1000 mL        Physical exam:   Does not follow commands   Cachectic   S/p tracheostomy   Normal air entry into the lungs, no wheezing, no crackles   RRR, normal s1/s2, no murmurs, rubs or gallops   Abdomen – soft, not tender, not distended   Extremities: no edema, s/p removal of AVG   Perm cath on the right of his chest           LABS:                        9.6    18.6  )-----------( 106      ( 06 May 2018 07:40 )             32.0     05-06    136  |  94<L>  |  67<H>  ----------------------------<  181<H>  5.6<H>   |  20<L>  |  3.98<H>    Ca    8.6      06 May 2018 07:40  Mg     2.0     05-06        PT/INR - ( 06 May 2018 07:40 )   PT: 32.1 sec;   INR: 2.83          PTT - ( 06 May 2018 07:40 )  PTT:41.1 sec          RADIOLOGY & ADDITIONAL STUDIES: Seen in the morning,  s/p tracheostomy, poor mental status. Does not follow consistently commands Last HD done on 5/5 - we tried yesterday HD - unstable tachycardic to 130 and drop of BP, so the hD was stopped  Renal service for GILMER – ATN – from cardiogenic shock        acetaminophen    Suspension. 650 milliGRAM(s) every 6 hours PRN  acetaminophen  Suppository 650 milliGRAM(s) every 6 hours PRN  aspirin  chewable 81 milliGRAM(s) daily  atorvastatin 80 milliGRAM(s) at bedtime  chlorhexidine 0.12% Liquid 15 milliLiter(s) two times a day  chlorhexidine 2% Cloths 1 Application(s) daily  dextrose 5%. 1000 milliLiter(s) <Continuous>  dextrose 50% Injectable 12.5 Gram(s) once  dextrose 50% Injectable 25 Gram(s) once  dextrose 50% Injectable 25 Gram(s) once  dextrose Gel 1 Dose(s) once PRN  ergocalciferol Drops 6000 Unit(s) daily  escitalopram 5 milliGRAM(s) daily  folic acid 1 milliGRAM(s) daily  glucagon  Injectable 1 milliGRAM(s) once PRN  insulin glargine Injectable (LANTUS) 10 Unit(s) at bedtime  insulin regular  human corrective regimen sliding scale   every 6 hours  levETIRAcetam  Solution 250 milliGRAM(s) <User Schedule>  levETIRAcetam  Solution 500 milliGRAM(s) every 24 hours  metoclopramide 5 milliGRAM(s) every 6 hours  midodrine 15 milliGRAM(s) every 8 hours  modafinil 100 milliGRAM(s) <User Schedule>  multivitamin 1 Tablet(s) daily  pantoprazole   Suspension 40 milliGRAM(s) daily  piperacillin/tazobactam IVPB. 2.25 Gram(s) every 8 hours  predniSONE   Tablet 15 milliGRAM(s) every 8 hours  silver sulfADIAZINE 1% Cream 1 Application(s) daily  thiamine 100 milliGRAM(s) daily      Allergies    No Known Allergies    Intolerances        T(C): , Max: 37.3 (05-05-18 @ 14:20)  T(F): , Max: 99.1 (05-05-18 @ 14:20)  HR: 100 (05-06-18 @ 08:30)  BP: 100/75 (05-06-18 @ 08:25)  BP(mean): 84 (05-06-18 @ 08:25)  RR: 18 (05-06-18 @ 08:25)  SpO2: 100% (05-06-18 @ 08:30)  Wt(kg): --    05-05 @ 07:01  -  05-06 @ 07:00  --------------------------------------------------------  IN:    Enteral Tube Flush: 100 mL    Solution: 300 mL    Vital HN: 600 mL  Total IN: 1000 mL    OUT:  Total OUT: 0 mL    Total NET: 1000 mL        Physical exam:   Does not follow commands   Cachectic   S/p tracheostomy   decreased BS r base, clear left   RRR, normal s1/s2, no murmurs, rubs or gallops   Abdomen – soft, not tender, not distended   Extremities: 1-2+ thigh  edema, s/p removal of AVG   Perm cath on the right of his chest           LABS:                        9.6    18.6  )-----------( 106      ( 06 May 2018 07:40 )             32.0     05-06    136  |  94<L>  |  67<H>  ----------------------------<  181<H>  5.6<H>   |  20<L>  |  3.98<H>    Ca    8.6      06 May 2018 07:40  Mg     2.0     05-06        PT/INR - ( 06 May 2018 07:40 )   PT: 32.1 sec;   INR: 2.83          PTT - ( 06 May 2018 07:40 )  PTT:41.1 sec          RADIOLOGY & ADDITIONAL STUDIES:

## 2018-05-06 NOTE — PROGRESS NOTE ADULT - PROBLEM SELECTOR PLAN 2
Course has been complicated by septic shock as well as component of cardiogenic shock. Patient required pressors and inotropes for which he has been weaned off. On midodrine 15 mg TID. Previously on albumin with dialysis that has been weaned off and has remained off at this time.  - c/w Midodrine 15 mg q8h to maintain SBP>65  -c/w prednisone 12mg q8h    #Adrenal Insufficiency  BP have been low with SBP in 80s-low 90s, though MAP> 60.  - c/w weaning steroids, prednisone 12mg q8h Course has been complicated by septic shock as well as component of cardiogenic shock. Patient required pressors and inotropes for which he has been weaned off. On midodrine 15 mg TID. Previously on albumin with dialysis that has been weaned off and has remained off at this time.  - c/w Midodrine 15 mg q8h to maintain SBP>65  -c/w prednisone 15mg q8h    #Adrenal Insufficiency  BP have been low with SBP in 80s-low 90s, though MAP> 60.  - c/w weaning steroids, prednisone 15mg q8h

## 2018-05-06 NOTE — PROGRESS NOTE ADULT - ASSESSMENT
The patient with GILMER – due to ATN – not resolving. – oligo/anuric. Requiring HD. LasT hd done on 5/5 - not a full session. The patient unstable in the HD - a few minutes and was stopped - was tachycardic to 130, BP dropped to 70/56. No need of HD today. If he is stable we will consider HD tomorrow

## 2018-05-06 NOTE — PROGRESS NOTE ADULT - PROBLEM SELECTOR PLAN 1
-Due to ATN – from cardiogenic shock – not resolving – on HD   -Last HD done on 5/5 - not a full session, only a few minutes, unstable   - no Hd for today 5/6  - please consider ECHo for pericardial effusion, also obtain blood cultures - white cout trending up and has pleural effusion on the on the right from his XR versus consolidation   -Electrolytes noted - potassium 5.6 - please consider Kayexalate 15 gr and follow up potassium   -Volume status acceptable –    -In and outs   -Daily weights   -Renal diet

## 2018-05-07 LAB
ANION GAP SERPL CALC-SCNC: 17 MMOL/L — SIGNIFICANT CHANGE UP (ref 5–17)
ANION GAP SERPL CALC-SCNC: 22 MMOL/L — HIGH (ref 5–17)
APTT BLD: 43.9 SEC — HIGH (ref 27.5–37.4)
BUN SERPL-MCNC: 73 MG/DL — HIGH (ref 7–23)
BUN SERPL-MCNC: 82 MG/DL — HIGH (ref 7–23)
CALCIUM SERPL-MCNC: 8.2 MG/DL — LOW (ref 8.4–10.5)
CALCIUM SERPL-MCNC: 8.3 MG/DL — LOW (ref 8.4–10.5)
CHLORIDE SERPL-SCNC: 96 MMOL/L — SIGNIFICANT CHANGE UP (ref 96–108)
CHLORIDE SERPL-SCNC: 98 MMOL/L — SIGNIFICANT CHANGE UP (ref 96–108)
CO2 SERPL-SCNC: 20 MMOL/L — LOW (ref 22–31)
CO2 SERPL-SCNC: 25 MMOL/L — SIGNIFICANT CHANGE UP (ref 22–31)
CREAT SERPL-MCNC: 3.9 MG/DL — HIGH (ref 0.5–1.3)
CREAT SERPL-MCNC: 4.32 MG/DL — HIGH (ref 0.5–1.3)
GLUCOSE BLDC GLUCOMTR-MCNC: 144 MG/DL — HIGH (ref 70–99)
GLUCOSE BLDC GLUCOMTR-MCNC: 206 MG/DL — HIGH (ref 70–99)
GLUCOSE BLDC GLUCOMTR-MCNC: 215 MG/DL — HIGH (ref 70–99)
GLUCOSE BLDC GLUCOMTR-MCNC: 225 MG/DL — HIGH (ref 70–99)
GLUCOSE BLDC GLUCOMTR-MCNC: 244 MG/DL — HIGH (ref 70–99)
GLUCOSE BLDC GLUCOMTR-MCNC: 278 MG/DL — HIGH (ref 70–99)
GLUCOSE SERPL-MCNC: 159 MG/DL — HIGH (ref 70–99)
GLUCOSE SERPL-MCNC: 240 MG/DL — HIGH (ref 70–99)
HCT VFR BLD CALC: 24.4 % — LOW (ref 39–50)
HGB BLD-MCNC: 7.6 G/DL — LOW (ref 13–17)
INR BLD: 2.95 — HIGH (ref 0.88–1.16)
INR BLD: 3.08 — HIGH (ref 0.88–1.16)
MAGNESIUM SERPL-MCNC: 2.2 MG/DL — SIGNIFICANT CHANGE UP (ref 1.6–2.6)
MCHC RBC-ENTMCNC: 27.9 PG — SIGNIFICANT CHANGE UP (ref 27–34)
MCHC RBC-ENTMCNC: 31.1 G/DL — LOW (ref 32–36)
MCV RBC AUTO: 89.7 FL — SIGNIFICANT CHANGE UP (ref 80–100)
PLATELET # BLD AUTO: 128 K/UL — LOW (ref 150–400)
POTASSIUM SERPL-MCNC: 4.7 MMOL/L — SIGNIFICANT CHANGE UP (ref 3.5–5.3)
POTASSIUM SERPL-MCNC: 5.6 MMOL/L — HIGH (ref 3.5–5.3)
POTASSIUM SERPL-SCNC: 4.7 MMOL/L — SIGNIFICANT CHANGE UP (ref 3.5–5.3)
POTASSIUM SERPL-SCNC: 5.6 MMOL/L — HIGH (ref 3.5–5.3)
PROTHROM AB SERPL-ACNC: 33.5 SEC — HIGH (ref 9.8–12.7)
PROTHROM AB SERPL-ACNC: 35 SEC — HIGH (ref 9.8–12.7)
RBC # BLD: 2.72 M/UL — LOW (ref 4.2–5.8)
RBC # FLD: 17.2 % — HIGH (ref 10.3–16.9)
SODIUM SERPL-SCNC: 138 MMOL/L — SIGNIFICANT CHANGE UP (ref 135–145)
SODIUM SERPL-SCNC: 140 MMOL/L — SIGNIFICANT CHANGE UP (ref 135–145)
WBC # BLD: 14.3 K/UL — HIGH (ref 3.8–10.5)
WBC # FLD AUTO: 14.3 K/UL — HIGH (ref 3.8–10.5)

## 2018-05-07 PROCEDURE — 90935 HEMODIALYSIS ONE EVALUATION: CPT | Mod: GC

## 2018-05-07 PROCEDURE — 99233 SBSQ HOSP IP/OBS HIGH 50: CPT

## 2018-05-07 PROCEDURE — 71045 X-RAY EXAM CHEST 1 VIEW: CPT | Mod: 26

## 2018-05-07 PROCEDURE — 99233 SBSQ HOSP IP/OBS HIGH 50: CPT | Mod: GC

## 2018-05-07 RX ORDER — ALBUMIN HUMAN 25 %
50 VIAL (ML) INTRAVENOUS
Qty: 0 | Refills: 0 | Status: DISCONTINUED | OUTPATIENT
Start: 2018-05-07 | End: 2018-05-07

## 2018-05-07 RX ORDER — IPRATROPIUM/ALBUTEROL SULFATE 18-103MCG
15 AEROSOL WITH ADAPTER (GRAM) INHALATION ONCE
Qty: 0 | Refills: 0 | Status: DISCONTINUED | OUTPATIENT
Start: 2018-05-07 | End: 2018-05-07

## 2018-05-07 RX ORDER — ERYTHROPOIETIN 10000 [IU]/ML
10000 INJECTION, SOLUTION INTRAVENOUS; SUBCUTANEOUS ONCE
Qty: 0 | Refills: 0 | Status: COMPLETED | OUTPATIENT
Start: 2018-05-07 | End: 2018-05-07

## 2018-05-07 RX ORDER — INSULIN HUMAN 100 [IU]/ML
4 INJECTION, SOLUTION SUBCUTANEOUS EVERY 6 HOURS
Qty: 0 | Refills: 0 | Status: DISCONTINUED | OUTPATIENT
Start: 2018-05-07 | End: 2018-05-08

## 2018-05-07 RX ORDER — ALBUTEROL 90 UG/1
20 AEROSOL, METERED ORAL ONCE
Qty: 0 | Refills: 0 | Status: COMPLETED | OUTPATIENT
Start: 2018-05-07 | End: 2018-05-07

## 2018-05-07 RX ORDER — DOXERCALCIFEROL 2.5 UG/1
2 CAPSULE ORAL ONCE
Qty: 0 | Refills: 0 | Status: COMPLETED | OUTPATIENT
Start: 2018-05-07 | End: 2018-05-07

## 2018-05-07 RX ORDER — CALCIUM GLUCONATE 100 MG/ML
1 VIAL (ML) INTRAVENOUS ONCE
Qty: 0 | Refills: 0 | Status: COMPLETED | OUTPATIENT
Start: 2018-05-07 | End: 2018-05-07

## 2018-05-07 RX ORDER — ALBUMIN HUMAN 25 %
50 VIAL (ML) INTRAVENOUS
Qty: 0 | Refills: 0 | Status: COMPLETED | OUTPATIENT
Start: 2018-05-07 | End: 2018-05-07

## 2018-05-07 RX ORDER — INSULIN GLARGINE 100 [IU]/ML
22 INJECTION, SOLUTION SUBCUTANEOUS AT BEDTIME
Qty: 0 | Refills: 0 | Status: DISCONTINUED | OUTPATIENT
Start: 2018-05-07 | End: 2018-05-10

## 2018-05-07 RX ORDER — WARFARIN SODIUM 2.5 MG/1
2.5 TABLET ORAL ONCE
Qty: 0 | Refills: 0 | Status: COMPLETED | OUTPATIENT
Start: 2018-05-07 | End: 2018-05-07

## 2018-05-07 RX ORDER — LEVETIRACETAM 250 MG/1
250 TABLET, FILM COATED ORAL ONCE
Qty: 0 | Refills: 0 | Status: COMPLETED | OUTPATIENT
Start: 2018-05-07 | End: 2018-05-07

## 2018-05-07 RX ADMIN — INSULIN HUMAN 4 UNIT(S): 100 INJECTION, SOLUTION SUBCUTANEOUS at 12:43

## 2018-05-07 RX ADMIN — MIDODRINE HYDROCHLORIDE 15 MILLIGRAM(S): 2.5 TABLET ORAL at 15:44

## 2018-05-07 RX ADMIN — INSULIN HUMAN 4: 100 INJECTION, SOLUTION SUBCUTANEOUS at 23:29

## 2018-05-07 RX ADMIN — INSULIN GLARGINE 22 UNIT(S): 100 INJECTION, SOLUTION SUBCUTANEOUS at 23:25

## 2018-05-07 RX ADMIN — Medication 5 MILLIGRAM(S): at 10:56

## 2018-05-07 RX ADMIN — INSULIN HUMAN 4 UNIT(S): 100 INJECTION, SOLUTION SUBCUTANEOUS at 17:29

## 2018-05-07 RX ADMIN — Medication 200 GRAM(S): at 10:44

## 2018-05-07 RX ADMIN — Medication 5 MILLIGRAM(S): at 23:25

## 2018-05-07 RX ADMIN — Medication 50 MILLILITER(S): at 18:08

## 2018-05-07 RX ADMIN — MIDODRINE HYDROCHLORIDE 15 MILLIGRAM(S): 2.5 TABLET ORAL at 23:27

## 2018-05-07 RX ADMIN — LEVETIRACETAM 250 MILLIGRAM(S): 250 TABLET, FILM COATED ORAL at 17:28

## 2018-05-07 RX ADMIN — MODAFINIL 100 MILLIGRAM(S): 200 TABLET ORAL at 06:06

## 2018-05-07 RX ADMIN — Medication 5 MILLIGRAM(S): at 17:28

## 2018-05-07 RX ADMIN — INSULIN HUMAN 4 UNIT(S): 100 INJECTION, SOLUTION SUBCUTANEOUS at 23:30

## 2018-05-07 RX ADMIN — ALBUTEROL 20 MILLIGRAM(S): 90 AEROSOL, METERED ORAL at 09:54

## 2018-05-07 RX ADMIN — INSULIN HUMAN 4: 100 INJECTION, SOLUTION SUBCUTANEOUS at 00:44

## 2018-05-07 RX ADMIN — Medication 15 MILLIGRAM(S): at 07:33

## 2018-05-07 RX ADMIN — Medication 81 MILLIGRAM(S): at 12:41

## 2018-05-07 RX ADMIN — Medication 15 MILLIGRAM(S): at 23:28

## 2018-05-07 RX ADMIN — Medication 50 MILLILITER(S): at 16:30

## 2018-05-07 RX ADMIN — Medication 15 MILLIGRAM(S): at 17:28

## 2018-05-07 RX ADMIN — CHLORHEXIDINE GLUCONATE 1 APPLICATION(S): 213 SOLUTION TOPICAL at 05:42

## 2018-05-07 RX ADMIN — MIDODRINE HYDROCHLORIDE 15 MILLIGRAM(S): 2.5 TABLET ORAL at 06:06

## 2018-05-07 RX ADMIN — CHLORHEXIDINE GLUCONATE 15 MILLILITER(S): 213 SOLUTION TOPICAL at 23:24

## 2018-05-07 RX ADMIN — Medication 5 MILLIGRAM(S): at 05:41

## 2018-05-07 RX ADMIN — LEVETIRACETAM 500 MILLIGRAM(S): 250 TABLET, FILM COATED ORAL at 10:56

## 2018-05-07 RX ADMIN — ERYTHROPOIETIN 10000 UNIT(S): 10000 INJECTION, SOLUTION INTRAVENOUS; SUBCUTANEOUS at 19:58

## 2018-05-07 RX ADMIN — ATORVASTATIN CALCIUM 80 MILLIGRAM(S): 80 TABLET, FILM COATED ORAL at 23:23

## 2018-05-07 RX ADMIN — Medication 1 MILLIGRAM(S): at 12:42

## 2018-05-07 RX ADMIN — Medication 50 MILLILITER(S): at 15:30

## 2018-05-07 RX ADMIN — ESCITALOPRAM OXALATE 5 MILLIGRAM(S): 10 TABLET, FILM COATED ORAL at 12:41

## 2018-05-07 RX ADMIN — Medication 15 MILLIGRAM(S): at 00:44

## 2018-05-07 RX ADMIN — ERGOCALCIFEROL 6000 UNIT(S): 1.25 CAPSULE ORAL at 12:41

## 2018-05-07 RX ADMIN — INSULIN HUMAN 6: 100 INJECTION, SOLUTION SUBCUTANEOUS at 11:16

## 2018-05-07 RX ADMIN — WARFARIN SODIUM 2.5 MILLIGRAM(S): 2.5 TABLET ORAL at 23:26

## 2018-05-07 RX ADMIN — CHLORHEXIDINE GLUCONATE 15 MILLILITER(S): 213 SOLUTION TOPICAL at 10:56

## 2018-05-07 RX ADMIN — MODAFINIL 100 MILLIGRAM(S): 200 TABLET ORAL at 13:44

## 2018-05-07 RX ADMIN — PIPERACILLIN AND TAZOBACTAM 200 GRAM(S): 4; .5 INJECTION, POWDER, LYOPHILIZED, FOR SOLUTION INTRAVENOUS at 05:41

## 2018-05-07 RX ADMIN — DOXERCALCIFEROL 2 MICROGRAM(S): 2.5 CAPSULE ORAL at 18:12

## 2018-05-07 RX ADMIN — Medication 100 MILLIGRAM(S): at 12:41

## 2018-05-07 RX ADMIN — PANTOPRAZOLE SODIUM 40 MILLIGRAM(S): 20 TABLET, DELAYED RELEASE ORAL at 12:41

## 2018-05-07 RX ADMIN — INSULIN HUMAN 4: 100 INJECTION, SOLUTION SUBCUTANEOUS at 07:31

## 2018-05-07 RX ADMIN — Medication 1 TABLET(S): at 12:42

## 2018-05-07 RX ADMIN — Medication 1 APPLICATION(S): at 12:44

## 2018-05-07 RX ADMIN — PIPERACILLIN AND TAZOBACTAM 200 GRAM(S): 4; .5 INJECTION, POWDER, LYOPHILIZED, FOR SOLUTION INTRAVENOUS at 14:00

## 2018-05-07 NOTE — PROGRESS NOTE ADULT - PROBLEM SELECTOR PLAN 1
Course complicated by septic shock 2/2 to aspiration with increasing O2 requirements for which patient was back in MICU. Tx with Zosyn. Resolving septic shock-requiring levophed. No longer on pressors. Weaning steroids  -c/w Zosyn (day 10 of 10 day course).   -WBC     #Fungemia  Noted to have Candida Tropicalis growing in blood cultures and completed fluconazole course. Resolved. Course complicated by septic shock 2/2 to aspiration with increasing O2 requirements for which patient was back in MICU. Tx with Zosyn. Resolving septic shock-requiring levophed. No longer on pressors. Weaning steroids  -c/w Zosyn (day 10 of 10 day course)- to complete today.  -WBC at 14.3 this AM. Will continue to monitor.    #Fungemia  Noted to have Candida Tropicalis growing in blood cultures and completed fluconazole course. Resolved.

## 2018-05-07 NOTE — PROGRESS NOTE ADULT - ASSESSMENT
63M PMH HFrEF 10-15% (ischemic), MI, s/p AICD vs PPM, possible Afib, HTN, DM2 on insulin, possible CKD, and gout, who presented with a chief complaint of generalized weakness s/p septic shock likely 2/2 LE cellulitis complicated by ATN requiring dialysis. Course complicated by AMS likely from brainstem infarct 2/2 LV thrombus and/or toxic metabolic encephalopathy and found to have candidemia and sepsis 2/2 klebsiella bacteremia s/p fluconazole and CTX. Course further complicated by aspiration PNA and currently on Zosyn (10days/10) for plan of 10 days. Remains therapeutic on warfarin. 63M PMH HFrEF 10-15% (ischemic), MI, s/p AICD vs PPM, possible Afib, HTN, DM2 on insulin, possible CKD, and gout, who presented with a chief complaint of generalized weakness s/p septic shock likely 2/2 LE cellulitis complicated by ATN requiring dialysis. Course complicated by AMS likely from brainstem infarct 2/2 LV thrombus and/or toxic metabolic encephalopathy and found to have candidemia and sepsis 2/2 klebsiella bacteremia s/p fluconazole and CTX. Course further complicated by aspiration PNA and currently on Zosyn (10days/10) to complete today. Further complication of hypotension with difficulty tolerating HD. To be given 1U PRBC (Hgb 7.6) and Albumin to attempt HD today.

## 2018-05-07 NOTE — PROGRESS NOTE ADULT - ASSESSMENT
62 yo M history of HFrEF 10-15% 2/2 ischemic cardiomyopathy, MI , s/p AICD vs PPM, ?Afib, Hypertension, Diabetes Mellitus Type 2 on insulin, CKD and gout, who presents with a chief complaint of generalized weakness admitted for severe sepsis 2/2 LE cellulitis vs pneumonia   Pt has developed renal failure (GILMER) and was started on CVVHD, converted to HD but is hypotensive with same.    Pt has a neurological event on Friday March 23.  He was intubated for airway protection.  His mental status remains poor, off sedation.  No seizure.  No CVA.

## 2018-05-07 NOTE — PROGRESS NOTE ADULT - PROBLEM SELECTOR PLAN 2
Will give EPO during HD treament  Not iron deficient  Hemoglobin stable at present.  Transfuse PRBC per primary team.

## 2018-05-07 NOTE — PROGRESS NOTE ADULT - PROBLEM SELECTOR PLAN 6
Support provided to patient and family. Patient to have access to supportive services during rest of hospital stay as the patient/family deemed necessary ie. Chaplaincy, Massage therapy, Music therapy, Patient and family supportive services, Palliative SW, etc.  As discussed during the palliative IDT meeting and as identified during the patients PSSA screening the patient would benefit from: massage therapy, music  and chapaincy    Call placed to pts HCP to review DNR status and readdress the role of icu if pt were to decompensate again (which he appears to be doing)

## 2018-05-07 NOTE — PROGRESS NOTE ADULT - PROBLEM SELECTOR PLAN 1
Patient with GILMER due to ischemic ATN on RRT now via Right chest wall tunnel HD catheter.    Last HD on 5/5 for UF and clearance but unable to tolerate HD treatment  Will reschedule HD treatment today for clearannce only HD treatment.  K 5.5, volume status hypervolemic  IV albumin during HD treatment.  No UF

## 2018-05-07 NOTE — PROGRESS NOTE ADULT - PROBLEM SELECTOR PLAN 5
Likely 2/2 phlebotomy and CKD, No bloody output. Prior studies showed Anemia of chronic disease. Hgb at 9.6. Remains stable.   - monitor CBC, transfuse if Hgb <7  -Keep active T&S Likely 2/2 phlebotomy and CKD, No bloody output. Prior studies showed Anemia of chronic disease. Hgb at 7.6. Remains stable.   -ordered for 1 U prbc to be given with HD.  - monitor CBC, transfuse if Hgb <7  -Keep active T&S

## 2018-05-07 NOTE — PROGRESS NOTE ADULT - PROBLEM SELECTOR PLAN 2
Course has been complicated by septic shock as well as component of cardiogenic shock. Patient required pressors and inotropes for which he has been weaned off. On midodrine 15 mg TID. Previously on albumin with dialysis that has been weaned off and has remained off at this time.  - c/w Midodrine 15 mg q8h to maintain SBP>65  -c/w prednisone 15mg q8h    #Adrenal Insufficiency  BP have been low with SBP in 80s-low 90s, though MAP> 60.  - c/w weaning steroids, prednisone 15mg q8h Course has been complicated by septic shock as well as component of cardiogenic shock. Patient required pressors and inotropes for which he has been weaned off. On midodrine 15 mg TID. Previously on albumin with dialysis that was weaned off. Difficulty tolerating HD recently- with last fully completed HD on 5/2- attempted Thurs 5/3 and Sat 5/5 but did not tolerated.  - c/w Midodrine 15 mg q8h  -1 U prbc and restart albumin with HD.   - c/w prednisone 15 mg q8h and consider increasing if remains hypotensive/unable to tolerate HD.    #Adrenal Insufficiency  BP have been low with SBP in 80s-low 90s, though MAP> 60.  - c/w  prednisone 15mg q8h  - Will attempt HD today, with 1Uprbc and albumin

## 2018-05-07 NOTE — PROGRESS NOTE ADULT - PROBLEM SELECTOR PLAN 1
Now on intermittent HD- pt requires midodrine 15mg po q8h in order to maintain adequate BP for HD  Pt unable to tolerate HD due to hypotension.  Pt required albumin and 1 unit of prbc today in order to tolerate HD and still had hypotension with SBR in the 80s  Pt has not been able to tolerate full HD session in the past week due to hypotension.  Pt at risk for electrolyte derangement and fluid overload as pt not having fluid removed.   Need to clarify if pt should be escalated back to ICU for pressors.

## 2018-05-07 NOTE — PROGRESS NOTE ADULT - SUBJECTIVE AND OBJECTIVE BOX
Patient was seen and evaluated on dialysis.   Patient is tolerating the procedure well.   HR: 74 (05-07-18 @ 14:00)  BP: 85/59 (05-07-18 @ 14:00)  Continue dialysis:   Dialyzer: Revaclear 300          QB: 350       QD: 500 2K+  Goal UF 0kg over 3.5 Hours

## 2018-05-07 NOTE — PROGRESS NOTE ADULT - PROBLEM SELECTOR PLAN 6
DM2 on Januvia at home. HbA1C 8.6. Has been on Lantus 42U qHS, insulin 7 units q6h. Insulin was decreased to 21U for being NPO but noted to have complication of hypoglycemia.    Increased Lantus to 18U. Remains on Glucerna feeds. DM2 on Januvia at home. HbA1C 8.6. Has been on Lantus 42U qHS, insulin 7 units q6h. Difficulty controlling glucose levels and switched to glucerna with episode of hypoglycemia.    -Increased Lantus to 18U. Remains on Glucerna feeds. Will add standing humilin q6h again.

## 2018-05-07 NOTE — PROGRESS NOTE ADULT - SUBJECTIVE AND OBJECTIVE BOX
Patient is a 63y Male seen and evaluated at bedside. Patient lying in bed in no acute distress remains on ventialtory support with tracheostomy. Patient unable to offer complaints at present. Last HD on 5/5 for Clearance only but could not tolerate well. Chest xray with Large right pleural effusion. Blood pressure still remains in lower 80 to 90s at present.      acetaminophen    Suspension. 650 every 6 hours PRN  acetaminophen  Suppository 650 every 6 hours PRN  albumin human 25% IVPB 50 every 1 hour  aspirin  chewable 81 daily  atorvastatin 80 at bedtime  chlorhexidine 0.12% Liquid 15 two times a day  chlorhexidine 2% Cloths 1 daily  dextrose 5%. 1000 <Continuous>  dextrose 50% Injectable 12.5 once  dextrose 50% Injectable 25 once  dextrose 50% Injectable 25 once  dextrose Gel 1 once PRN  doxercalciferol Injectable 2 once  epoetin amy Injectable 14403 once  ergocalciferol Drops 6000 daily  escitalopram 5 daily  folic acid 1 daily  glucagon  Injectable 1 once PRN  insulin glargine Injectable (LANTUS) 22 at bedtime  insulin regular  human corrective regimen sliding scale  every 6 hours  insulin regular  human recombinant 4 every 6 hours  levETIRAcetam  Solution 250 <User Schedule>  levETIRAcetam  Solution 500 every 24 hours  metoclopramide 5 every 6 hours  midodrine 15 every 8 hours  modafinil 100 <User Schedule>  multivitamin 1 daily  pantoprazole   Suspension 40 daily  piperacillin/tazobactam IVPB. 2.25 every 8 hours  predniSONE   Tablet 15 every 8 hours  silver sulfADIAZINE 1% Cream 1 daily  thiamine 100 daily      Allergies    No Known Allergies    Intolerances        T(C): , Max: 36.6 (05-06-18 @ 13:00)  T(F): , Max: 97.9 (05-06-18 @ 21:00)  HR: 77 (05-07-18 @ 09:30)  BP: 85/65 (05-07-18 @ 04:20)  BP(mean): 71 (05-07-18 @ 04:20)  RR: 11 (05-07-18 @ 09:30)  SpO2: 100% (05-07-18 @ 09:30)  Wt(kg): --    05-06 @ 07:01  -  05-07 @ 07:00  --------------------------------------------------------  IN: 700 mL / OUT: 0 mL / NET: 700 mL          Review of Systems:  Limited. Patient on ventilatory support through tracheostomy.       PHYSICAL EXAM:  GENERAL: Ill appearing, lying in bed on tracheostomy and ventilatory support in no distress at present  HEAD:  Atraumatic, Normocephalic,   EYES: Bilateral conjuctival and scleral pallor+nt  Oral cavity: Oral mucosa dry and pale  NECK: Neck supple, No JVD  CHEST/LUNG: Bilateral decreased breath sounds, Bibasilar rales and crepitations+nt, No wheezing  HEART: Regular rate and rhythm. HOMER II/VI at LPSB, No gallop, no rub   ABDOMEN: Soft, Nontender, non distended, PEG tube present. BS+nt, No flank tenderness.   EXTREMITIES: Dependent thigh and leg edema++nt, No clubbing, cyanosis  Neurology: AAOx1, lying in bed, unable to follow verbal commands.  SKIN: No rashes or lesions          ACCESS: Right chest wall tunnel HD catheter present. NO bleeding.    LABS:                        7.6    14.3  )-----------( 128      ( 07 May 2018 07:14 )             24.4     05-07    138  |  96  |  82<H>  ----------------------------<  240<H>  5.6<H>   |  20<L>  |  4.32<H>    Ca    8.2<L>      07 May 2018 07:14  Mg     2.2     05-07        PT/INR - ( 06 May 2018 07:40 )   PT: 32.1 sec;   INR: 2.83          PTT - ( 06 May 2018 07:40 )  PTT:41.1 sec          RADIOLOGY & ADDITIONAL STUDIES:    < from: Xray Chest 1 View- PORTABLE-Routine (05.05.18 @ 06:27) >    EXAM:  XR CHEST PORTABLE ROUTINE 1V                          PROCEDURE DATE:  05/05/2018                     INTERPRETATION:  Clinical History: Respiratory failure    Portable examination the chest demonstrates no interval change lung   pathology in comparison to prior examination of the chest 5/4/2018. No   interval change position remaining support devices.    Impression: No interval change lung pathology            "Thank you for the opportunity to participate in the care of this   patient."        RADAH L ALISSA M.D., ATTENDING RADIOLOGIST  This document has been electronically signed. May  5 2018 11:45AM                  < end of copied text >

## 2018-05-07 NOTE — PROGRESS NOTE ADULT - ATTENDING COMMENTS
Patient seen and examined with house-staff during bedside rounds.  Resident note read, including vitals, physical findings, laboratory data, and radiological reports.   Revisions included below.  Direct personal management at bed side and extensive interpretation of the data.  Plan was outlined and discussed in details with the housestaff.  Decision making of high complexity  Action taken for acute disease activity to reflect the level of care provided:  - medication reconciliation  - review laboratory data  - Management of MV and RF  - Management of anemia.  Epogen and blood transfusion with HD  - On Midodrine and Prednisone.  HD with no fluid removal  - discussed with renal  - last day of antibiotic

## 2018-05-07 NOTE — PROGRESS NOTE ADULT - PROBLEM SELECTOR PLAN 9
Hx of Afib and LV thrombus on coumadin prior to admission. Most recent TTE with [facility] shows no LV thrombus currently.   - c/w holding Metoprolol 12.5 q12h given hypotension and HR as been controlled in 80-100s. Will use Dig for rate control if RVR- goal <110  -Now therapeutic on coumadin- INR at 2.4. C/w 2.5 coumadin.      #LV thrombus  LV thrombus noted on RUKHSANA on 4/10 for which patient has been on hep gtt. C/w bridge.  -c/w Coumadin as above. Hx of Afib and LV thrombus on coumadin prior to admission. Most recent TTE with [facility] shows no LV thrombus currently.   - c/w holding Metoprolol 12.5 q12h given hypotension and HR as been controlled in 80-100s. Will use Dig for rate control if RVR- goal <110  -Now therapeutic on coumadin- no INR this AM- will repeat today to assess dose for tonight (as dose changed from 5 to 2.5mg yesterday).      #LV thrombus  LV thrombus noted on RUKHSANA on 4/10 for which patient has been on hep gtt. C/w bridge.  -c/w Coumadin as above.

## 2018-05-07 NOTE — PROGRESS NOTE ADULT - PROBLEM SELECTOR PLAN 4
Likely 2/2 to ischemic ATN in setting of sepsis with hypoperfusion (unknown baseline) presented with Cr 3.93 with metabolic derangements. Renal following, remains on HD. Permacath replaced multiple times over course for bacteremia and fungemia. Now with L subclavian HD cath.   - f/u renal recs-  - S/p kayexalatex2 K at 5.6 then 5.4  -c/w monitoring electrolytes/ Cautious with repleting in setting of HD.  - Remains off albumin.    #Adrenal Insufficiency- previously on fludrocortisone and prednisone. Started on stress dose steroids in setting of acute infection.  -weaning off steroids  -c/w prednisone 15 mg q8h.    #Elevated AG-has fluctuated between 20-25, Bicarb 20-22. Lactate negative, glucose has been well controlled, likely 2/2 uremia in setting of ESRD.   - Monitor BMP Likely 2/2 to ischemic ATN in setting of sepsis with hypoperfusion (unknown baseline) presented with Cr 3.93 with metabolic derangements. Renal following, remains on HD. Permacath replaced multiple times over course for bacteremia and fungemia. Now with L subclavian HD cath.   - f/u renal recs- Will plan for HD today.  - S/p kayexalatex2 K at 5.6 this AM, Duonebs and caclium gluconate.  -c/w monitoring electrolytes/ Cautious with repleting in setting of HD.  - Plan for albumin and 1U Prbc w/ HD today with anticipation to help tolerate.  - Appears more overloaded today, 2+ edema, Rhonchi and crackles on lung exam, CXR with increasing effusions- therefore needs HD. Plan to optimize to help tolerate HD.    #Adrenal Insufficiency- previously on fludrocortisone and prednisone. Started on stress dose steroids in setting of acute infection.  -c/w prednisone 15 mg q8h.    #Elevated AG-has fluctuated between 20-25, Bicarb 20-22. Lactate negative, glucose has been well controlled, likely 2/2 uremia in setting of ESRD.   - Monitor BMP

## 2018-05-07 NOTE — PROGRESS NOTE ADULT - SUBJECTIVE AND OBJECTIVE BOX
HUNTER BRIZUELA   MRN-[id number]     (1955):     HPI:  64 yo M history of HFrEF 10-15% 2/2 ischemic cardiomyopathy, MI , s/p AICD vs PPM, ?Afib, Hypertension, Diabetes Mellitus Type 2 on insulin, CKD?and gout, who presents with a chief complaint of generalized weakness. Pt wad admitted to [facility] 2 months ago for gout and was sent to rehab. He was discharged home mid Feb with VNS. In the past 2 weeks patient has noted increased leg pain and weakness bilaterally. In the last few days, he has also experienced generalized weakness prompting him to come to ER.   In ER, noted to have t max of 102, SBP 70s, leukocytosis to 32.8, Lactate of 6.6, Acute renal failure with severe metabolic derangements. Given 3.5L fluids and Vanc/Zosyn. MICU consulted. (10 Mar 2018 18:51)    Pt known to palliative medicine earlier in this hospitalization when pt was initially in the MICU.  During that time, pt was critically ill  on life support (mech ventilation, CVVHD, pressors).  Panraven met with pt's HCP, (dgt Cristal) to discuss overall goals of care, as pt was not trached or PEG'd yet.   Pt had minimal mental status. Pt was able to open eyes, follow simple commands intermittently.  Pt's dgt agreed to trach and vent as well as PEG to allow pt time to make an improvement as pt has suffered a CVA and his neurological prognosis was unclear. Palliative medicine signed off on .  The goal at that time was to allow pt to stabilize with a plan to transition to LTAC, when medically appropriate.  Pt's dgt was going to reassess pt's overall condition after some time...  Pt has remained in the hospital since .  He has been in (mostly) and out of ICU.  He remains vented thru his trach, he is on HD with high dose midodrine He currently does not require albumin to tolerate his HD sessions.  Pts mental status remains poor.    Palliative medicine will reconsult to discuss with pts dgt plan of care as pt has been hospitalized for months.     Interval History:  see above  Since last week, pt has had increasing difficulty in tolerating HD.  Pt was only on HD a few minutes on Saturday 2/2 hypotension.  Today, pt required albumin and received 1 u PRBC in order to tolerate the session.  Even with albumin and blood, pt had marginal BPs in the 80s.  Pt is not currently stable for transfer out of the hospital.    ROS:   nonverbal   Unable to attain ROS due to:  poor mental status                    Dyspnea (Heriberto 0-10):                       N/V (Y/N):                            Secretions (Y/N):              Agitation(Y/N):  Pain (Y/N):       -Provocation/Palliation:  -Quality/Quantity:  -Radiating:  -Severity:  -Timing/Frequency:  -Impact on ADLs:    Allergies:  No Known Allergies    Intolerances    Opiate Naive (Y/N):   yes  -iStop reviewed (Y/N):  yes ref # [id number]      MEDICATIONS  (STANDING):  albumin human 25% IVPB 50 milliLiter(s) IV Intermittent every 1 hour  aspirin  chewable 81 milliGRAM(s) Oral daily  atorvastatin 80 milliGRAM(s) Oral at bedtime  chlorhexidine 0.12% Liquid 15 milliLiter(s) Swish and Spit two times a day  chlorhexidine 2% Cloths 1 Application(s) Topical daily  dextrose 5%. 1000 milliLiter(s) (50 mL/Hr) IV Continuous <Continuous>  dextrose 50% Injectable 12.5 Gram(s) IV Push once  dextrose 50% Injectable 25 Gram(s) IV Push once  dextrose 50% Injectable 25 Gram(s) IV Push once  doxercalciferol Injectable 2 MICROGram(s) IV Push once  epoetin amy Injectable 87412 Unit(s) IV Push once  ergocalciferol Drops 6000 Unit(s) Oral daily  escitalopram 5 milliGRAM(s) Oral daily  folic acid 1 milliGRAM(s) Oral daily  insulin glargine Injectable (LANTUS) 22 Unit(s) SubCutaneous at bedtime  insulin regular  human corrective regimen sliding scale   SubCutaneous every 6 hours  insulin regular  human recombinant 4 Unit(s) SubCutaneous every 6 hours  levETIRAcetam  Solution 500 milliGRAM(s) Oral every 24 hours  levETIRAcetam  Solution 250 milliGRAM(s) Oral once  metoclopramide 5 milliGRAM(s) Oral every 6 hours  midodrine 15 milliGRAM(s) Oral every 8 hours  modafinil 100 milliGRAM(s) Oral <User Schedule>  multivitamin 1 Tablet(s) Oral daily  pantoprazole   Suspension 40 milliGRAM(s) Oral daily  piperacillin/tazobactam IVPB. 2.25 Gram(s) IV Intermittent every 8 hours  predniSONE   Tablet 15 milliGRAM(s) Oral every 8 hours  silver sulfADIAZINE 1% Cream 1 Application(s) Topical daily  thiamine 100 milliGRAM(s) Oral daily    MEDICATIONS  (PRN):  acetaminophen    Suspension. 650 milliGRAM(s) Oral every 6 hours PRN Moderate Pain (4 - 6)  acetaminophen  Suppository 650 milliGRAM(s) Rectal every 6 hours PRN For Temp greater than 38 C (100.4 F)  dextrose Gel 1 Dose(s) Oral once PRN Blood Glucose LESS THAN 70 milliGRAM(s)/deciliter  glucagon  Injectable 1 milliGRAM(s) IntraMuscular once PRN Glucose LESS THAN 70 milligrams/deciliter    Labs:                                                           7.6    14.3  )-----------( 128      ( 07 May 2018 07:14 )             24.4     05-07    138  |  96  |  82<H>  ----------------------------<  240<H>  5.6<H>   |  20<L>  |  4.32<H>    Ca    8.2<L>      07 May 2018 07:14  Mg     2.2     05-07    IMAGING:      none new       PEx:  Vital Signs Last 24 Hrs  T(C): 36.1 (07 May 2018 15:15), Max: 36.6 (06 May 2018 21:00)  T(F): 96.9 (07 May 2018 15:15), Max: 97.9 (06 May 2018 21:00)  HR: 69 (07 May 2018 15:15) (69 - 104)  BP: 82/62 (07 May 2018 15:15) (82/62 - 93/68)  BP(mean): 69 (07 May 2018 15:15) (63 - 77)  RR: 13 (07 May 2018 15:15) (11 - 22)  SpO2: 100% (07 May 2018 15:15) (92% - 100%)      General:  ill appearing, thin, not distressed   HEENT:  nc/at, thin,  temporal wasting, moist oral cavity, eyes remain closed- eyes flutter to tactile stimuli  Neck:   supple, neg lymphadenopathy,  L IJ TLC, trach in place-- attached to vent  CVS:  SR,  rate controlled, pacer noted to  L chest wall, + R HD catheter, distant heart tones  Resp:  non-labored, no rhonchi  GI:   nondistended, soft,  +PEG tube in place  :   + scrotal edema, anuric  Musc:   weak, thin, no spontaneous movement  Ext: significant generalized peripheral edema  Neuro:   not following commands, weak withdrawal from pain to R upper ext   Psych:  maritza  Skin:  thin, dry, cool, + generalized edema although improving, pitting edema to the hips bilaterally  Lymph:  neg  Preadmit Karnofsky:   50 %           Current Karnofsky:   30    %  Cachexia (Y/N):   yes  BMI:  20.6  (in March, pts BMI was 22)    Advanced Directives:     Full Code     HCP noted on chart     DPOA  (for finances)       Decision maker:  Pt does not have any ability to participate in medical decision making.  Legal surrogate:  pts dgt Cristal Castro [phone] is pts HCP    Social History:   single, lives by self, pt has a HHA 4hrs/day, 3 days per week.  Pt has 2 adult children (Cristal aged 37) and pt's son, is 24 and lives in [place]. 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    He is retired but reports he is not on disability.   Pt is "spiritual" and practices a "Pentecostal" raquel background   Per notes, pt has not been compliant with his medication (not picked up prescription from his outpt pharmacy) since 2017.    GOALS OF CARE DISCUSSION:       Palliative care info/counseling provided-- following	           Family meeting-  call placed to pts HCP Cristal today to discuss goals and DNR status       Advanced Directives addressed please see Advance Care Planning Note-- 	           Documentation of GOC:  to allow more time for a neurologic recovery          REFERRALS:	        Palliative Med        Unit SW/Case Mgmt       - referred       Massage Therapy-- referred       Music Therapy-- referred       Nutrition-- following

## 2018-05-07 NOTE — PROGRESS NOTE ADULT - SUBJECTIVE AND OBJECTIVE BOX
PROGRESS NOTE    CC:  Overnight Events:  Interval History:   ROS:    OBJECTIVE  Vitals:  T(C): 36.5 (05-07-18 @ 05:00), Max: 36.8 (05-06-18 @ 09:39)  HR: 80 (05-07-18 @ 04:20) (80 - 104)  BP: 85/65 (05-07-18 @ 04:20) (83/56 - 100/75)  RR: 22 (05-07-18 @ 04:20) (14 - 22)  SpO2: 100% (05-07-18 @ 04:20) (99% - 100%)  Wt(kg): --  Mode: AC/ CMV (Assist Control/ Continuous Mandatory Ventilation)  RR (machine): 10  TV (machine): 500  FiO2: 40  PEEP: 5  ITime: 1  MAP: 8.4  PIP: 20    I/O:  I&O's Summary    05 May 2018 07:01  -  06 May 2018 07:00  --------------------------------------------------------  IN: 1000 mL / OUT: 0 mL / NET: 1000 mL    06 May 2018 07:01  -  07 May 2018 06:13  --------------------------------------------------------  IN: 700 mL / OUT: 0 mL / NET: 700 mL        PHYSICAL EXAM:  Appearance: NAD. Speaking in full sentences.   HEENT:   Conjunctiva clear b/l. Moist oral mucosa.  Cardiovascular: RRR with no murmurs.  Respiratory: Lungs CTAB.   Gastrointestinal:  Soft, nontender. Non-distended. Non-rigid.	  Extremities: No edema b/l. No erythema b/l. LE WWP b/l.  Vascular: DP 2+ b/l.  Neurologic:  Alert and awake. Moving all extremities. Following commands. Making eye contact.  	  LABS:                        9.6    18.6  )-----------( 106      ( 06 May 2018 07:40 )             32.0     05-06    137  |  94<L>  |  76<H>  ----------------------------<  227<H>  5.5<H>   |  19<L>  |  4.14<H>    Ca    8.4      06 May 2018 19:33  Mg     2.0     05-06      PT/INR - ( 06 May 2018 07:40 )   PT: 32.1 sec;   INR: 2.83          PTT - ( 06 May 2018 07:40 )  PTT:41.1 sec      RADIOLOGY & ADDITIONAL TESTS:  Reviewed .    MEDICATIONS  (STANDING):  aspirin  chewable 81 milliGRAM(s) Oral daily  atorvastatin 80 milliGRAM(s) Oral at bedtime  chlorhexidine 0.12% Liquid 15 milliLiter(s) Swish and Spit two times a day  chlorhexidine 2% Cloths 1 Application(s) Topical daily  dextrose 5%. 1000 milliLiter(s) (50 mL/Hr) IV Continuous <Continuous>  dextrose 50% Injectable 12.5 Gram(s) IV Push once  dextrose 50% Injectable 25 Gram(s) IV Push once  dextrose 50% Injectable 25 Gram(s) IV Push once  ergocalciferol Drops 6000 Unit(s) Oral daily  escitalopram 5 milliGRAM(s) Oral daily  folic acid 1 milliGRAM(s) Oral daily  insulin glargine Injectable (LANTUS) 18 Unit(s) SubCutaneous at bedtime  insulin regular  human corrective regimen sliding scale   SubCutaneous every 6 hours  levETIRAcetam  Solution 250 milliGRAM(s) Oral <User Schedule>  levETIRAcetam  Solution 500 milliGRAM(s) Oral every 24 hours  metoclopramide 5 milliGRAM(s) Oral every 6 hours  midodrine 15 milliGRAM(s) Oral every 8 hours  modafinil 100 milliGRAM(s) Oral <User Schedule>  multivitamin 1 Tablet(s) Oral daily  pantoprazole   Suspension 40 milliGRAM(s) Oral daily  piperacillin/tazobactam IVPB. 2.25 Gram(s) IV Intermittent every 8 hours  predniSONE   Tablet 15 milliGRAM(s) Oral every 8 hours  silver sulfADIAZINE 1% Cream 1 Application(s) Topical daily  thiamine 100 milliGRAM(s) Oral daily    MEDICATIONS  (PRN):  acetaminophen    Suspension. 650 milliGRAM(s) Oral every 6 hours PRN Moderate Pain (4 - 6)  acetaminophen  Suppository 650 milliGRAM(s) Rectal every 6 hours PRN For Temp greater than 38 C (100.4 F)  dextrose Gel 1 Dose(s) Oral once PRN Blood Glucose LESS THAN 70 milliGRAM(s)/deciliter  glucagon  Injectable 1 milliGRAM(s) IntraMuscular once PRN Glucose LESS THAN 70 milligrams/deciliter PROGRESS NOTE    CC: Septic/cardiogenic shock, ATN on HD, CVA  Overnight Events: K at 5.5 s/p 1x Kayexalate, 1g Ca gluconate and albuterol. Increased Lantus to 18U. ELSIE.   Interval History:   ROS:    OBJECTIVE  Vitals:  T(C): 36.5 (05-07-18 @ 05:00), Max: 36.8 (05-06-18 @ 09:39)  HR: 80 (05-07-18 @ 04:20) (80 - 104)  BP: 85/65 (05-07-18 @ 04:20) (83/56 - 100/75)  RR: 22 (05-07-18 @ 04:20) (14 - 22)  SpO2: 100% (05-07-18 @ 04:20) (99% - 100%)  Wt(kg): --  Mode: AC/ CMV (Assist Control/ Continuous Mandatory Ventilation)  RR (machine): 10  TV (machine): 500  FiO2: 40  PEEP: 5  ITime: 1  MAP: 8.4  PIP: 20    I/O:  I&O's Summary    05 May 2018 07:01  -  06 May 2018 07:00  --------------------------------------------------------  IN: 1000 mL / OUT: 0 mL / NET: 1000 mL    06 May 2018 07:01  -  07 May 2018 06:13  --------------------------------------------------------  IN: 700 mL / OUT: 0 mL / NET: 700 mL        PHYSICAL EXAM:  Appearance: NAD. Speaking in full sentences.   HEENT:   Conjunctiva clear b/l. Moist oral mucosa.  Cardiovascular: RRR with no murmurs.  Respiratory: Lungs CTAB.   Gastrointestinal:  Soft, nontender. Non-distended. Non-rigid.	  Extremities: No edema b/l. No erythema b/l. LE WWP b/l.  Vascular: DP 2+ b/l.  Neurologic:  Alert and awake. Moving all extremities. Following commands. Making eye contact.  	  LABS:                        9.6    18.6  )-----------( 106      ( 06 May 2018 07:40 )             32.0     05-06    137  |  94<L>  |  76<H>  ----------------------------<  227<H>  5.5<H>   |  19<L>  |  4.14<H>    Ca    8.4      06 May 2018 19:33  Mg     2.0     05-06      PT/INR - ( 06 May 2018 07:40 )   PT: 32.1 sec;   INR: 2.83          PTT - ( 06 May 2018 07:40 )  PTT:41.1 sec      RADIOLOGY & ADDITIONAL TESTS:  Reviewed .    MEDICATIONS  (STANDING):  aspirin  chewable 81 milliGRAM(s) Oral daily  atorvastatin 80 milliGRAM(s) Oral at bedtime  chlorhexidine 0.12% Liquid 15 milliLiter(s) Swish and Spit two times a day  chlorhexidine 2% Cloths 1 Application(s) Topical daily  dextrose 5%. 1000 milliLiter(s) (50 mL/Hr) IV Continuous <Continuous>  dextrose 50% Injectable 12.5 Gram(s) IV Push once  dextrose 50% Injectable 25 Gram(s) IV Push once  dextrose 50% Injectable 25 Gram(s) IV Push once  ergocalciferol Drops 6000 Unit(s) Oral daily  escitalopram 5 milliGRAM(s) Oral daily  folic acid 1 milliGRAM(s) Oral daily  insulin glargine Injectable (LANTUS) 18 Unit(s) SubCutaneous at bedtime  insulin regular  human corrective regimen sliding scale   SubCutaneous every 6 hours  levETIRAcetam  Solution 250 milliGRAM(s) Oral <User Schedule>  levETIRAcetam  Solution 500 milliGRAM(s) Oral every 24 hours  metoclopramide 5 milliGRAM(s) Oral every 6 hours  midodrine 15 milliGRAM(s) Oral every 8 hours  modafinil 100 milliGRAM(s) Oral <User Schedule>  multivitamin 1 Tablet(s) Oral daily  pantoprazole   Suspension 40 milliGRAM(s) Oral daily  piperacillin/tazobactam IVPB. 2.25 Gram(s) IV Intermittent every 8 hours  predniSONE   Tablet 15 milliGRAM(s) Oral every 8 hours  silver sulfADIAZINE 1% Cream 1 Application(s) Topical daily  thiamine 100 milliGRAM(s) Oral daily    MEDICATIONS  (PRN):  acetaminophen    Suspension. 650 milliGRAM(s) Oral every 6 hours PRN Moderate Pain (4 - 6)  acetaminophen  Suppository 650 milliGRAM(s) Rectal every 6 hours PRN For Temp greater than 38 C (100.4 F)  dextrose Gel 1 Dose(s) Oral once PRN Blood Glucose LESS THAN 70 milliGRAM(s)/deciliter  glucagon  Injectable 1 milliGRAM(s) IntraMuscular once PRN Glucose LESS THAN 70 milligrams/deciliter PROGRESS NOTE    CC: Septic/cardiogenic shock, ATN on HD, CVA  Overnight Events: K at 5.5 s/p 1x Kayexalate, 1g Ca gluconate and albuterol. Increased Lantus to 18U. ELSIE.   Interval History:  Unable to assess. Not opening eyes to verbal or painful stimuli. Can squeeze R hand. Not moving LUE. Unable to answer questions appropriately.   ROS: As above.    OBJECTIVE  Vitals:  T(C): 36.5 (05-07-18 @ 05:00), Max: 36.8 (05-06-18 @ 09:39)  HR: 80 (05-07-18 @ 04:20) (80 - 104)  BP: 85/65 (05-07-18 @ 04:20) (83/56 - 100/75)  RR: 22 (05-07-18 @ 04:20) (14 - 22)  SpO2: 100% (05-07-18 @ 04:20) (99% - 100%)  Wt(kg): --  Mode: AC/ CMV (Assist Control/ Continuous Mandatory Ventilation)  RR (machine): 10  TV (machine): 500  FiO2: 40  PEEP: 5  ITime: 1  MAP: 8.4  PIP: 20    I/O:  I&O's Summary    05 May 2018 07:01  -  06 May 2018 07:00  --------------------------------------------------------  IN: 1000 mL / OUT: 0 mL / NET: 1000 mL    06 May 2018 07:01  -  07 May 2018 06:13  --------------------------------------------------------  IN: 700 mL / OUT: 0 mL / NET: 700 mL        PHYSICAL EXAM:  Appearance: NAD. With trach and peg. Unable to respond appropriately.  HEENT:  Difficulty assessing eyes- minimal eye opening. Conjunctiva clear b/l. Moist oral mucosa.  Cardiovascular: IRregularly irregular, S1, S2 no murmurs appreciated.   Respiratory: Lungs with b/l rhonchi diffusely. Trach in place.   Gastrointestinal:  Soft, nontender. Non-distended. Non-rigid.	  Extremities: 1+ edema b/l to mid calf. 2+ dependent edema. No erythema b/l. LE WWP b/l.  Vascular: DP diminished though present.  Neurologic:  Lethargic, minimal response to verbal or painful stimuli. No eye opening today. Minimal command following- squeezes R hand. Not moving LUE. Moving b/l lower extremities.   	  LABS:                        9.6    18.6  )-----------( 106      ( 06 May 2018 07:40 )             32.0     05-06    137  |  94<L>  |  76<H>  ----------------------------<  227<H>  5.5<H>   |  19<L>  |  4.14<H>    Ca    8.4      06 May 2018 19:33  Mg     2.0     05-06      PT/INR - ( 06 May 2018 07:40 )   PT: 32.1 sec;   INR: 2.83          PTT - ( 06 May 2018 07:40 )  PTT:41.1 sec      RADIOLOGY & ADDITIONAL TESTS:  Reviewed .    MEDICATIONS  (STANDING):  aspirin  chewable 81 milliGRAM(s) Oral daily  atorvastatin 80 milliGRAM(s) Oral at bedtime  chlorhexidine 0.12% Liquid 15 milliLiter(s) Swish and Spit two times a day  chlorhexidine 2% Cloths 1 Application(s) Topical daily  dextrose 5%. 1000 milliLiter(s) (50 mL/Hr) IV Continuous <Continuous>  dextrose 50% Injectable 12.5 Gram(s) IV Push once  dextrose 50% Injectable 25 Gram(s) IV Push once  dextrose 50% Injectable 25 Gram(s) IV Push once  ergocalciferol Drops 6000 Unit(s) Oral daily  escitalopram 5 milliGRAM(s) Oral daily  folic acid 1 milliGRAM(s) Oral daily  insulin glargine Injectable (LANTUS) 18 Unit(s) SubCutaneous at bedtime  insulin regular  human corrective regimen sliding scale   SubCutaneous every 6 hours  levETIRAcetam  Solution 250 milliGRAM(s) Oral <User Schedule>  levETIRAcetam  Solution 500 milliGRAM(s) Oral every 24 hours  metoclopramide 5 milliGRAM(s) Oral every 6 hours  midodrine 15 milliGRAM(s) Oral every 8 hours  modafinil 100 milliGRAM(s) Oral <User Schedule>  multivitamin 1 Tablet(s) Oral daily  pantoprazole   Suspension 40 milliGRAM(s) Oral daily  piperacillin/tazobactam IVPB. 2.25 Gram(s) IV Intermittent every 8 hours  predniSONE   Tablet 15 milliGRAM(s) Oral every 8 hours  silver sulfADIAZINE 1% Cream 1 Application(s) Topical daily  thiamine 100 milliGRAM(s) Oral daily    MEDICATIONS  (PRN):  acetaminophen    Suspension. 650 milliGRAM(s) Oral every 6 hours PRN Moderate Pain (4 - 6)  acetaminophen  Suppository 650 milliGRAM(s) Rectal every 6 hours PRN For Temp greater than 38 C (100.4 F)  dextrose Gel 1 Dose(s) Oral once PRN Blood Glucose LESS THAN 70 milliGRAM(s)/deciliter  glucagon  Injectable 1 milliGRAM(s) IntraMuscular once PRN Glucose LESS THAN 70 milligrams/deciliter

## 2018-05-07 NOTE — PROGRESS NOTE ADULT - PROBLEM SELECTOR PLAN 7
S/p tracheostomy 4/5. Previous bedside ultrasound notable for b/l pleural effusions and atelectatic lung-Likely related to fluid overload. If clinically worsening, can consider thoracentesis for fluid studies and cultures. Course further complicated by septic shock from aspiration PNA.   - c/w daily CPAP trials, weaning to trach collar as tolerated  - c/w zosyn (Day 10/10 day course). S/p tracheostomy 4/5. Previous bedside ultrasound notable for b/l pleural effusions and atelectatic lung-Likely related to fluid overload. If clinically worsening, can consider thoracentesis for fluid studies and cultures. Course further complicated by septic shock from aspiration PNA.   - c/w daily CPAP trials, weaning to trach collar as tolerated  - c/w zosyn (Day 10/10 day course).To complete today. Will discontinue and monitor for signs of infection.

## 2018-05-07 NOTE — PROGRESS NOTE ADULT - PROBLEM SELECTOR PLAN 4
Patient with severe systolic CHF with worsening volume status and pleural effusion  Will give albumin during HD  Pulmonary follow up for large right plerual effusion.  Consider repeat echocardiogram to assess pericardial effusion  Consider pleural tap for recurrent right pleural effusion.  Unable to tolerate UF during HD for now.  Optimize CHF treatment per cardiology/CHF team  May need IV inotropic agents

## 2018-05-07 NOTE — PROGRESS NOTE ADULT - PROBLEM SELECTOR PLAN 3
Acute change in mental status likely 2/2 brainstem infarct with component of encephalopathy from infection. 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 Per neurology may have had ischemic event from hypoperfusion. Also showing disc protrusion at C3/C4 level coursing superiorly to the C2/C3 contacting the ventral spinal cord. Per neurology, this may be contributing to weakness, however would not explain change in mental status. Exam limited- minimal responsiveness to verbal painful stimuli. Minimally following commands intermittently. Moving RUE, no LUE movement.  - c/w Modafinil  - No MRI at this time for further prognostication.  -Exam remains notable for lethargy, opens eyes to verbal or painful stimuli. Moving RUE. Rigid LUE, not moving with painful stimuli. Minimal b/l lower extremity withdrawal to pain.    #Seizures- c/w keppra 500 mg qd with extra 250 mg post HD days Acute change in mental status likely 2/2 brainstem infarct with component of encephalopathy from infection. 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 Per neurology may have had ischemic event from hypoperfusion. Also showing disc protrusion at C3/C4 level coursing superiorly to the C2/C3 contacting the ventral spinal cord. Per neurology, this may be contributing to weakness, however would not explain change in mental status. Exam limited- minimal responsiveness to verbal painful stimuli. Minimally following commands intermittently. Moving RUE, no LUE movement.  - c/w Modafinil  - No MRI at this time for further prognostication.  -Exam remains notable for lethargy, no eye openin this AM. Moving RUE. Rigid LUE, not moving with painful stimuli. Minimal b/l lower extremity withdrawal to pain.    #Seizures- c/w keppra 500 mg qd with extra 250 mg post HD days

## 2018-05-08 DIAGNOSIS — Z66 DO NOT RESUSCITATE: ICD-10-CM

## 2018-05-08 LAB
ANION GAP SERPL CALC-SCNC: 18 MMOL/L — HIGH (ref 5–17)
BUN SERPL-MCNC: 60 MG/DL — HIGH (ref 7–23)
CALCIUM SERPL-MCNC: 8.5 MG/DL — SIGNIFICANT CHANGE UP (ref 8.4–10.5)
CHLORIDE SERPL-SCNC: 95 MMOL/L — LOW (ref 96–108)
CO2 SERPL-SCNC: 24 MMOL/L — SIGNIFICANT CHANGE UP (ref 22–31)
CREAT SERPL-MCNC: 3.22 MG/DL — HIGH (ref 0.5–1.3)
GLUCOSE BLDC GLUCOMTR-MCNC: 144 MG/DL — HIGH (ref 70–99)
GLUCOSE BLDC GLUCOMTR-MCNC: 155 MG/DL — HIGH (ref 70–99)
GLUCOSE BLDC GLUCOMTR-MCNC: 191 MG/DL — HIGH (ref 70–99)
GLUCOSE BLDC GLUCOMTR-MCNC: 204 MG/DL — HIGH (ref 70–99)
GLUCOSE BLDC GLUCOMTR-MCNC: 206 MG/DL — HIGH (ref 70–99)
GLUCOSE BLDC GLUCOMTR-MCNC: 229 MG/DL — HIGH (ref 70–99)
GLUCOSE SERPL-MCNC: 216 MG/DL — HIGH (ref 70–99)
HCT VFR BLD CALC: 30.7 % — LOW (ref 39–50)
HGB BLD-MCNC: 9.6 G/DL — LOW (ref 13–17)
INR BLD: 2.93 — HIGH (ref 0.88–1.16)
MAGNESIUM SERPL-MCNC: 2 MG/DL — SIGNIFICANT CHANGE UP (ref 1.6–2.6)
MCHC RBC-ENTMCNC: 28.8 PG — SIGNIFICANT CHANGE UP (ref 27–34)
MCHC RBC-ENTMCNC: 31.3 G/DL — LOW (ref 32–36)
MCV RBC AUTO: 92.2 FL — SIGNIFICANT CHANGE UP (ref 80–100)
PLATELET # BLD AUTO: 130 K/UL — LOW (ref 150–400)
POTASSIUM SERPL-MCNC: 4.8 MMOL/L — SIGNIFICANT CHANGE UP (ref 3.5–5.3)
POTASSIUM SERPL-SCNC: 4.8 MMOL/L — SIGNIFICANT CHANGE UP (ref 3.5–5.3)
PROTHROM AB SERPL-ACNC: 33.3 SEC — HIGH (ref 9.8–12.7)
RBC # BLD: 3.33 M/UL — LOW (ref 4.2–5.8)
RBC # FLD: 16.9 % — SIGNIFICANT CHANGE UP (ref 10.3–16.9)
SODIUM SERPL-SCNC: 137 MMOL/L — SIGNIFICANT CHANGE UP (ref 135–145)
WBC # BLD: 17.5 K/UL — HIGH (ref 3.8–10.5)
WBC # FLD AUTO: 17.5 K/UL — HIGH (ref 3.8–10.5)

## 2018-05-08 PROCEDURE — 71045 X-RAY EXAM CHEST 1 VIEW: CPT | Mod: 26

## 2018-05-08 PROCEDURE — 99232 SBSQ HOSP IP/OBS MODERATE 35: CPT | Mod: GC

## 2018-05-08 PROCEDURE — 99233 SBSQ HOSP IP/OBS HIGH 50: CPT

## 2018-05-08 PROCEDURE — 99233 SBSQ HOSP IP/OBS HIGH 50: CPT | Mod: GC

## 2018-05-08 RX ORDER — WARFARIN SODIUM 2.5 MG/1
2.5 TABLET ORAL ONCE
Qty: 0 | Refills: 0 | Status: COMPLETED | OUTPATIENT
Start: 2018-05-08 | End: 2018-05-08

## 2018-05-08 RX ORDER — INSULIN HUMAN 100 [IU]/ML
8 INJECTION, SOLUTION SUBCUTANEOUS EVERY 6 HOURS
Qty: 0 | Refills: 0 | Status: DISCONTINUED | OUTPATIENT
Start: 2018-05-08 | End: 2018-05-10

## 2018-05-08 RX ADMIN — ERGOCALCIFEROL 6000 UNIT(S): 1.25 CAPSULE ORAL at 12:12

## 2018-05-08 RX ADMIN — INSULIN HUMAN 8 UNIT(S): 100 INJECTION, SOLUTION SUBCUTANEOUS at 18:08

## 2018-05-08 RX ADMIN — PANTOPRAZOLE SODIUM 40 MILLIGRAM(S): 20 TABLET, DELAYED RELEASE ORAL at 12:12

## 2018-05-08 RX ADMIN — ESCITALOPRAM OXALATE 5 MILLIGRAM(S): 10 TABLET, FILM COATED ORAL at 12:12

## 2018-05-08 RX ADMIN — Medication 5 MILLIGRAM(S): at 10:04

## 2018-05-08 RX ADMIN — Medication 1 TABLET(S): at 12:11

## 2018-05-08 RX ADMIN — MIDODRINE HYDROCHLORIDE 15 MILLIGRAM(S): 2.5 TABLET ORAL at 14:55

## 2018-05-08 RX ADMIN — Medication 81 MILLIGRAM(S): at 12:11

## 2018-05-08 RX ADMIN — Medication 100 MILLIGRAM(S): at 12:11

## 2018-05-08 RX ADMIN — INSULIN HUMAN 8 UNIT(S): 100 INJECTION, SOLUTION SUBCUTANEOUS at 23:38

## 2018-05-08 RX ADMIN — INSULIN GLARGINE 22 UNIT(S): 100 INJECTION, SOLUTION SUBCUTANEOUS at 23:11

## 2018-05-08 RX ADMIN — INSULIN HUMAN 4: 100 INJECTION, SOLUTION SUBCUTANEOUS at 12:14

## 2018-05-08 RX ADMIN — MIDODRINE HYDROCHLORIDE 15 MILLIGRAM(S): 2.5 TABLET ORAL at 07:17

## 2018-05-08 RX ADMIN — MODAFINIL 100 MILLIGRAM(S): 200 TABLET ORAL at 12:12

## 2018-05-08 RX ADMIN — Medication 1 APPLICATION(S): at 12:13

## 2018-05-08 RX ADMIN — INSULIN HUMAN 4 UNIT(S): 100 INJECTION, SOLUTION SUBCUTANEOUS at 12:14

## 2018-05-08 RX ADMIN — Medication 5 MILLIGRAM(S): at 07:16

## 2018-05-08 RX ADMIN — CHLORHEXIDINE GLUCONATE 15 MILLILITER(S): 213 SOLUTION TOPICAL at 10:04

## 2018-05-08 RX ADMIN — CHLORHEXIDINE GLUCONATE 15 MILLILITER(S): 213 SOLUTION TOPICAL at 21:30

## 2018-05-08 RX ADMIN — Medication 15 MILLIGRAM(S): at 07:18

## 2018-05-08 RX ADMIN — Medication 1 MILLIGRAM(S): at 12:11

## 2018-05-08 RX ADMIN — CHLORHEXIDINE GLUCONATE 1 APPLICATION(S): 213 SOLUTION TOPICAL at 07:15

## 2018-05-08 RX ADMIN — WARFARIN SODIUM 2.5 MILLIGRAM(S): 2.5 TABLET ORAL at 21:31

## 2018-05-08 RX ADMIN — INSULIN HUMAN 4: 100 INJECTION, SOLUTION SUBCUTANEOUS at 07:15

## 2018-05-08 RX ADMIN — Medication 5 MILLIGRAM(S): at 16:53

## 2018-05-08 RX ADMIN — Medication 20 MILLIGRAM(S): at 18:08

## 2018-05-08 RX ADMIN — Medication 5 MILLIGRAM(S): at 21:31

## 2018-05-08 RX ADMIN — INSULIN HUMAN 4 UNIT(S): 100 INJECTION, SOLUTION SUBCUTANEOUS at 07:16

## 2018-05-08 RX ADMIN — MODAFINIL 100 MILLIGRAM(S): 200 TABLET ORAL at 07:18

## 2018-05-08 RX ADMIN — INSULIN HUMAN 2: 100 INJECTION, SOLUTION SUBCUTANEOUS at 23:37

## 2018-05-08 RX ADMIN — INSULIN HUMAN 2: 100 INJECTION, SOLUTION SUBCUTANEOUS at 18:08

## 2018-05-08 RX ADMIN — ATORVASTATIN CALCIUM 80 MILLIGRAM(S): 80 TABLET, FILM COATED ORAL at 21:30

## 2018-05-08 RX ADMIN — LEVETIRACETAM 500 MILLIGRAM(S): 250 TABLET, FILM COATED ORAL at 10:04

## 2018-05-08 RX ADMIN — MIDODRINE HYDROCHLORIDE 15 MILLIGRAM(S): 2.5 TABLET ORAL at 21:32

## 2018-05-08 NOTE — PROGRESS NOTE ADULT - PROBLEM SELECTOR PLAN 3
Noted to have change in mental status- encephalopathy likely multifactorial- component of infectious (Septic/cardiogenic shock) as well as complication for brain stem infarct.   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 Per neurology may have had ischemic event from hypoperfusion. Also showing disc protrusion at C3/C4 level coursing superiorly to the C2/C3 contacting the ventral spinal cord.   -Neurology was previously consulted and was following and contributed to noted new weakness/mental status changes.   -Exam with some improvement today, more arousable, following some commands intermittently and can squeeze hand with R hand. Opens eyes spontaneously. Though noted during rounds with some twitching of mouth- may be component of seizure.   - c/w Modafinil  -c/w monitoring neuro exam.   - For complication of seizures- c/w keppra 500 mg qd with extra 250 mg post HD days

## 2018-05-08 NOTE — PROGRESS NOTE ADULT - ASSESSMENT
62 yo M history of HFrEF 10-15% 2/2 ischemic cardiomyopathy, MI , s/p AICD vs PPM, ?Afib, Hypertension, Diabetes Mellitus Type 2 on insulin, CKD and gout, who presents with a chief complaint of generalized weakness admitted for severe sepsis 2/2 LE cellulitis vs pneumonia   Pt has developed renal failure (GILMER) and was started on CVVHD, converted to HD but is hypotensive with same.    Pt has a neurological event on Friday March 23.  He was intubated for airway protection.  His mental status remains poor, off sedation.  No seizure.  No CVA.  s/p tracheostomy and ventilator to allow pts time to recover.    Pt has remained hospitalized over the past weeks, in and out of ICU, pt remains hemodynamically unstable at times. Pt remains HD dependent and ventilator dependent.    Pt has not made any improvements in his overall status. He remains chronically critically ill.

## 2018-05-08 NOTE — PROGRESS NOTE ADULT - ASSESSMENT
The patient with GILMER – due to ATN – not resolving. – oligo/anuric. Requiring HD  Last HD on 5/7 with no UF and clearance only treatment.

## 2018-05-08 NOTE — PROGRESS NOTE ADULT - PROBLEM SELECTOR PLAN 2
Treated for both septic as well as cardiogenic shock- requiring pressors and inotropes. Subsequently weaned off- On midodrine 15 mg TID. Has intermittently used albumin to tolerate dialysis. Further complicated by continued low pressures- over 24 hours ranging sbp , maintaining MAPs>65. HD yesterday with no volume taken off- just filitration- last full dialysis was last wednesday 5/2.  - c/w Midodrine 15 mg q8h  -Prednisone switched to 20 mg BID for weaning.     #Adrenal Insufficiency  BP have been low with SBP in , with MAP> 60.  -switched to prednisone 20mg BID.   -F/u renal recs regarding HD, underwent some filtration yesterday, without volume taken off. Poorly tolerating HD recently with last full volume taken off on 5/2- last wednesday.

## 2018-05-08 NOTE — PROGRESS NOTE ADULT - PROBLEM SELECTOR PLAN 6
Advance care planning meeting  Start time:  430p  End time: 505p  Total time: 35 min    A face to face meeting to discuss advance care planning was held today regarding: HUNTER MONTANA  Primary decision maker:  pts HCP Cristal Castro  Alternate/surrogate:  Discussed advance directives including, but not limited to, healthcare proxy and code status.   Met with pts HCP with Dr Gonzalez and this writer.  Case reviewed in detail.  HCP made aware that despite the extended hospital stay and aggressive care, pts has not made progress toward recovery, but in fact declined.  HCP acknowledged same.  Ms Montana reports that other family members are not in agreement with DNR order but she knows that her father would want this.  At this time, HCP feels compelled to follow the wishes of pts extended family to continue other therapies short of CPR.  Ms Montana completed a MOLST form and I placed in on chart.     Decision regarding code status:  dnr  Documentation completed today:  dnr form

## 2018-05-08 NOTE — PROGRESS NOTE ADULT - PROBLEM SELECTOR PLAN 7
S/p tracheostomy 4/5. Previous bedside ultrasound notable for b/l pleural effusions and atelectatic lung-Likely related to fluid overload. If clinically worsening, can consider thoracentesis for fluid studies and cultures. Course further complicated by septic shock from aspiration PNA.   - c/w daily CPAP trials- tolerated 2 hours.   - Completed Zosyn course for aspiration PNA.

## 2018-05-08 NOTE — PROGRESS NOTE ADULT - PROBLEM SELECTOR PLAN 6
DM2 on Januvia at home. HbA1C 8.6. Has been on Lantus 42U qHS, insulin 7 units q6h. Difficulty controlling glucose levels and switched to glucerna with episode of hypoglycemia.    -Increased Lantus to 22U and increased Humulin 8U today. Monitor FS and adjust as needed. Though cautious with glucerna feeds for concern of worsening hyperkalemia.

## 2018-05-08 NOTE — PROGRESS NOTE ADULT - PROBLEM SELECTOR PLAN 3
mental status remains overall poor.  Pt sleeps most of the day, intermittently follows some simple commands.  Remains dependent for all care.  Pt noted to be more awake in the presence of his daughter.

## 2018-05-08 NOTE — PROGRESS NOTE ADULT - ATTENDING COMMENTS
Discussed goals of care with his daughter and HCP with palliative care. DNR issued but only for CPR. To continue all other care including pressors if needed.

## 2018-05-08 NOTE — PROGRESS NOTE ADULT - SUBJECTIVE AND OBJECTIVE BOX
HUNTER BRIZUELA   [id number]     (1955):     HPI:  62 yo M history of HFrEF 10-15% 2/2 ischemic cardiomyopathy, MI , s/p AICD vs PPM, ?Afib, Hypertension, Diabetes Mellitus Type 2 on insulin, CKD?and gout, who presents with a chief complaint of generalized weakness. Pt wad admitted to [facility] 2 months ago for gout and was sent to rehab. He was discharged home mid Feb with VNS. In the past 2 weeks patient has noted increased leg pain and weakness bilaterally. In the last few days, he has also experienced generalized weakness prompting him to come to ER.   In ER, noted to have t max of 102, SBP 70s, leukocytosis to 32.8, Lactate of 6.6, Acute renal failure with severe metabolic derangements. Given 3.5L fluids and Vanc/Zosyn. MICU consulted. (10 Mar 2018 18:51)    Pt known to palliative medicine earlier in this hospitalization when pt was initially in the MICU.  During that time, pt was critically ill  on life support (mech ventilation, CVVHD, pressors).  Quofore met with pt's HCP, (dgt Cristla) to discuss overall goals of care, as pt was not trached or PEG'd yet.   Pt had minimal mental status. Pt was able to open eyes, follow simple commands intermittently.  Pt's dgt agreed to trach and vent as well as PEG to allow pt time to make an improvement as pt has suffered a CVA and his neurological prognosis was unclear. Palliative medicine signed off on .  The goal at that time was to allow pt to stabilize with a plan to transition to LTAC, when medically appropriate.  Pt's dgt was going to reassess pt's overall condition after some time...  Pt has remained in the hospital since .  He has been in (mostly) and out of ICU.  He remains vented thru his trach, he is on HD with high dose midodrine He currently does not require albumin to tolerate his HD sessions.  Pts mental status remains poor.    Palliative medicine will reconsult to discuss with pts dgt plan of care as pt has been hospitalized for months.     Interval History:  see above  Pt stable with hypotension today, but BP in pts normal range (sbp 90s)  Pts dgt at bedside, Pts eyes open in the presence on his dgt.  Remains non verbal.  Pt and RN reports period fo severe diaphoresis    ROS:   nonverbal   Unable to attain ROS due to:  poor mental status                    Dyspnea (Heriberto 0-10):                       N/V (Y/N):                            Secretions (Y/N):              Agitation(Y/N):  Pain (Y/N):       -Provocation/Palliation:  -Quality/Quantity:  -Radiating:  -Severity:  -Timing/Frequency:  -Impact on ADLs:    Allergies:  No Known Allergies    Intolerances    Opiate Naive (Y/N):   yes  -iStop reviewed (Y/N):  yes ref # [id number]    MEDICATIONS  (STANDING):  aspirin  chewable 81 milliGRAM(s) Oral daily  atorvastatin 80 milliGRAM(s) Oral at bedtime  chlorhexidine 0.12% Liquid 15 milliLiter(s) Swish and Spit two times a day  chlorhexidine 2% Cloths 1 Application(s) Topical daily  dextrose 5%. 1000 milliLiter(s) (50 mL/Hr) IV Continuous <Continuous>  dextrose 50% Injectable 12.5 Gram(s) IV Push once  dextrose 50% Injectable 25 Gram(s) IV Push once  dextrose 50% Injectable 25 Gram(s) IV Push once  ergocalciferol Drops 6000 Unit(s) Oral daily  escitalopram 5 milliGRAM(s) Oral daily  folic acid 1 milliGRAM(s) Oral daily  insulin glargine Injectable (LANTUS) 22 Unit(s) SubCutaneous at bedtime  insulin regular  human corrective regimen sliding scale   SubCutaneous every 6 hours  insulin regular  human recombinant 8 Unit(s) SubCutaneous every 6 hours  levETIRAcetam  Solution 500 milliGRAM(s) Oral every 24 hours  metoclopramide 5 milliGRAM(s) Oral every 6 hours  midodrine 15 milliGRAM(s) Oral every 8 hours  modafinil 100 milliGRAM(s) Oral <User Schedule>  multivitamin 1 Tablet(s) Oral daily  pantoprazole   Suspension 40 milliGRAM(s) Oral daily  predniSONE   Tablet 20 milliGRAM(s) Oral two times a day  silver sulfADIAZINE 1% Cream 1 Application(s) Topical daily  thiamine 100 milliGRAM(s) Oral daily    MEDICATIONS  (PRN):  acetaminophen    Suspension. 650 milliGRAM(s) Oral every 6 hours PRN Moderate Pain (4 - 6)  acetaminophen  Suppository 650 milliGRAM(s) Rectal every 6 hours PRN For Temp greater than 38 C (100.4 F)  dextrose Gel 1 Dose(s) Oral once PRN Blood Glucose LESS THAN 70 milliGRAM(s)/deciliter  glucagon  Injectable 1 milliGRAM(s) IntraMuscular once PRN Glucose LESS THAN 70 milligrams/deciliter      Labs:                                     cbc                 9.6    17.5  )-----------( 130      ( 08 May 2018 11:16 )             30.7     05    137  |  95<L>  |  60<H>  ----------------------------<  216<H>  4.8   |  24  |  3.22<H>    Ca    8.5      08 May 2018 11:16  Mg     2.0           IMAGING:      none new       PEx:  Vital Signs Last 24 Hrs  T(C): 36.3 (08 May 2018 14:00), Max: 36.8 (07 May 2018 23:00)  T(F): 97.4 (08 May 2018 14:00), Max: 98.2 (07 May 2018 23:00)  HR: 86 (08 May 2018 16:36) (65 - 118)  BP: 98/75 (08 May 2018 16:36) (81/58 - 105/76)  BP(mean): 83 (08 May 2018 16:36) (66 - 87)  RR: 16 (08 May 2018 16:36) (14 - 18)  SpO2: 100% (08 May 2018 16:36) (90% - 100%)      General:  ill appearing, thin, not distressed   HEENT:  nc/at, thin,  temporal wasting, moist oral cavity, eyes remain closed- eyes flutter to tactile stimuli  Neck:   supple, neg lymphadenopathy,  L IJ TLC, trach in place-- attached to vent  CVS:  SR,  rate controlled, pacer noted to  L chest wall, + R HD catheter, distant heart tones  Resp:  non-labored, no rhonchi  GI:   nondistended, soft,  +PEG tube in place  :   + scrotal edema, anuric  Musc:   weak, thin, no spontaneous movement  Ext: significant generalized peripheral edema  Neuro:   not following commands, weak withdrawal from pain to R upper ext   Psych:  maritza  Skin:  thin, dry, cool, + generalized edema although improving, pitting edema to the hips bilaterally  Lymph:  neg  Preadmit Karnofsky:   50 %           Current Karnofsky:   30    %  Cachexia (Y/N):   yes  BMI:  20.6  (in March, pts BMI was 22)    Advanced Directives:     Full Code     HCP noted on chart     DPOA  (for finances)       Decision maker:  Pt does not have any ability to participate in medical decision making.  Legal surrogate:  pts dgt Cristal Castro [phone] is pts HCP    Social History:   single, lives by self, pt has a HHA 4hrs/day, 3 days per week.  Pt has 2 adult children (Cristal aged 37) and pt's son, is 24 and [facility]. 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    He is retired but reports he is not on disability.   Pt is "spiritual" and practices a "Denominational" raquel background   Per notes, pt has not been compliant with his medication (not picked up prescription from his outpt pharmacy) since 2017.    GOALS OF CARE DISCUSSION:       Palliative care info/counseling provided-- following	           Family meeting-  call placed to pts HCP Cristal today to discuss goals and DNR status       Advanced Directives addressed please see Advance Care Planning Note-- 	           Documentation of GOC:  to allow more time for a neurologic recovery          REFERRALS:	        Palliative Med        Unit SW/Case Mgmt       - referred       Massage Therapy-- referred       Music Therapy-- referred       Nutrition-- following

## 2018-05-08 NOTE — PROGRESS NOTE ADULT - PROBLEM SELECTOR PLAN 2
EPO during HD treament  Not iron deficient  Received 1Unit PRBC yesterday. No repeat CBC at present.  Transfuse PRBC per primary team.

## 2018-05-08 NOTE — PROGRESS NOTE ADULT - PROBLEM SELECTOR PLAN 9
Hx of Afib and LV thrombus on coumadin prior to admission. Most recent TTE with [facility]inity shows no LV thrombus currently.   - HR has remained stable, Lorpessor has been held in setting of  Will use Dig for rate control if RVR- goal <110  -Now therapeutic on coumadin- INR this AM at 2.93. C/w Coumadin 2.5mg to dose for tonight.      #LV thrombus  LV thrombus noted on RUKHSANA on 4/10 for which patient has been on hep gtt. C/w bridge.  -c/w Coumadin as above. Hx of Afib and LV thrombus on coumadin prior to admission. Most recent TTE with Def[facility] shows no LV thrombus currently.   - HR has remained stable, Lopressor has been held in setting of hypotension and plan for Dig for rate control if RVR- goal <110.  -Now therapeutic on coumadin- INR this AM at 2.93 (goal 2-3). C/w Coumadin 2.5mg to dose for tonight.      #LV thrombus  LV thrombus noted on RUKHSANA on 4/10 for which patient has been on hep gtt. C/w bridge.  -c/w Coumadin 2.5mg tonight (INR 2.93-therapeutic tonight- goal 2-3).

## 2018-05-08 NOTE — PROGRESS NOTE ADULT - ATTENDING COMMENTS
able to dialyze pt yesterday but w/o UF   discussed with team (and family ) re prognosis-- to decide on aggressiveness of care

## 2018-05-08 NOTE — PROGRESS NOTE ADULT - PROBLEM SELECTOR PLAN 4
as per echo on 4/10-- severe global hypokinesis of LV with est EF of < 15%  AICD noted to L chest wall

## 2018-05-08 NOTE — PROGRESS NOTE ADULT - SUBJECTIVE AND OBJECTIVE BOX
Patient is a 63y Male seen and evaluated at bedside. Patient lying in bed in no acute distress on ventilatory support through tracheostomy. Interval last HD treatment on 5/7 with no UF with interim hypotension and low blood pressure ranging from 75 systolic. Chest xray with Large pleural effusion.     acetaminophen    Suspension. 650 every 6 hours PRN  acetaminophen  Suppository 650 every 6 hours PRN  aspirin  chewable 81 daily  atorvastatin 80 at bedtime  chlorhexidine 0.12% Liquid 15 two times a day  chlorhexidine 2% Cloths 1 daily  dextrose 5%. 1000 <Continuous>  dextrose 50% Injectable 12.5 once  dextrose 50% Injectable 25 once  dextrose 50% Injectable 25 once  dextrose Gel 1 once PRN  ergocalciferol Drops 6000 daily  escitalopram 5 daily  folic acid 1 daily  glucagon  Injectable 1 once PRN  insulin glargine Injectable (LANTUS) 22 at bedtime  insulin regular  human corrective regimen sliding scale  every 6 hours  insulin regular  human recombinant 4 every 6 hours  levETIRAcetam  Solution 500 every 24 hours  metoclopramide 5 every 6 hours  midodrine 15 every 8 hours  modafinil 100 <User Schedule>  multivitamin 1 daily  pantoprazole   Suspension 40 daily  predniSONE   Tablet 20 two times a day  silver sulfADIAZINE 1% Cream 1 daily  thiamine 100 daily      Allergies    No Known Allergies    Intolerances        T(C): , Max: 36.8 (05-07-18 @ 23:00)  T(F): , Max: 98.2 (05-07-18 @ 23:00)  HR: 97 (05-08-18 @ 10:31)  BP: 95/76 (05-08-18 @ 08:37)  BP(mean): 82 (05-08-18 @ 08:37)  RR: 16 (05-08-18 @ 08:37)  SpO2: 97% (05-08-18 @ 10:31)  Wt(kg): --    05-07 @ 07:01  -  05-08 @ 07:00  --------------------------------------------------------  IN: 2120 mL / OUT: 0 mL / NET: 2120 mL          Review of Systems:  Limited. Patient remains non verbal and unable to follow verbal commands on ventilatory support.      PHYSICAL EXAM:  GENERAL: Ill appearing, lying in bed, non verbal on tracheostomy/ventilatory in no acute distress at present  HEAD:  Atraumatic, Normocephalic,   EYES: Bilateral conjuctival and scleral pallor+nt  Oral cavity: Oral mucosa moist and pale  NECK: Neck supple, mild JVP, Tracheostomy present.  CHEST/LUNG: Bilateral decreased breath sounds, Right>left, Bibasilar rales and crepitations, no wheezing  HEART: Regular rate and rhythm. HOMER II/VI at LPSB, No gallop, no rub   ABDOMEN: Soft, Nontender, non distended, BS+nt, No flank tenderness.   EXTREMITIES: Bilateral thigh and leg edema+nt  Neurology: AAOx3, no focal neurological deficit  SKIN: No rashes or lesions          ACCESS: Right chest wall tunnel Hd catheter present. no bleeding    LABS:                        7.6    14.3  )-----------( 128      ( 07 May 2018 07:14 )             24.4     05-07    140  |  98  |  73<H>  ----------------------------<  159<H>  4.7   |  25  |  3.90<H>    Ca    8.3<L>      07 May 2018 17:01  Mg     2.2     05-07        PT/INR - ( 07 May 2018 17:03 )   PT: 33.5 sec;   INR: 2.95          PTT - ( 07 May 2018 17:03 )  PTT:43.9 sec          RADIOLOGY & ADDITIONAL STUDIES:    < from: Xray Chest 1 View- PORTABLE-Routine (05.05.18 @ 06:27) >    EXAM:  XR CHEST PORTABLE ROUTINE 1V                          PROCEDURE DATE:  05/05/2018                     INTERPRETATION:  Clinical History: Respiratory failure    Portable examination the chest demonstrates no interval change lung   pathology in comparison to prior examination of the chest 5/4/2018. No   interval change position remaining support devices.    Impression: No interval change lung pathology            "Thank you for the opportunity to participate in the care of this   patient."        RADHA MAXWELL M.D., ATTENDING RADIOLOGIST  This document has been electronically signed. May  5 2018 11:45AM                  < end of copied text > Patient is a 63y Male seen and evaluated at bedside. Patient lying in bed in no acute distress on ventilatory support through tracheostomy. Interval last HD treatment on 5/7 with no UF with interim hypotension and low blood pressure ranging from 75 systolic. Chest xray with Large pleural effusion.     acetaminophen    Suspension. 650 every 6 hours PRN  acetaminophen  Suppository 650 every 6 hours PRN  aspirin  chewable 81 daily  atorvastatin 80 at bedtime  chlorhexidine 0.12% Liquid 15 two times a day  chlorhexidine 2% Cloths 1 daily  dextrose 5%. 1000 <Continuous>  dextrose 50% Injectable 12.5 once  dextrose 50% Injectable 25 once  dextrose 50% Injectable 25 once  dextrose Gel 1 once PRN  ergocalciferol Drops 6000 daily  escitalopram 5 daily  folic acid 1 daily  glucagon  Injectable 1 once PRN  insulin glargine Injectable (LANTUS) 22 at bedtime  insulin regular  human corrective regimen sliding scale  every 6 hours  insulin regular  human recombinant 4 every 6 hours  levETIRAcetam  Solution 500 every 24 hours  metoclopramide 5 every 6 hours  midodrine 15 every 8 hours  modafinil 100 <User Schedule>  multivitamin 1 daily  pantoprazole   Suspension 40 daily  predniSONE   Tablet 20 two times a day  silver sulfADIAZINE 1% Cream 1 daily  thiamine 100 daily      Allergies    No Known Allergies    Intolerances        T(C): , Max: 36.8 (05-07-18 @ 23:00)  T(F): , Max: 98.2 (05-07-18 @ 23:00)  HR: 97 (05-08-18 @ 10:31)  BP: 95/76 (05-08-18 @ 08:37)  BP(mean): 82 (05-08-18 @ 08:37)  RR: 16 (05-08-18 @ 08:37)  SpO2: 97% (05-08-18 @ 10:31)  Wt(kg): --    05-07 @ 07:01  -  05-08 @ 07:00  --------------------------------------------------------  IN: 2120 mL / OUT: 0 mL / NET: 2120 mL          Review of Systems:  Limited. Patient remains non verbal and unable to follow verbal commands on ventilatory support.      PHYSICAL EXAM:  GENERAL: Ill appearing, lying in bed, non verbal on tracheostomy/ventilatory in no acute distress at present  HEAD:  Atraumatic, Normocephalic,   EYES: Bilateral conjuctival and scleral pallor+nt  Oral cavity: Oral mucosa moist and pale  NECK: Neck supple, mild JVP, Tracheostomy present.  CHEST/LUNG: Bilateral decreased breath sounds, Right>left, Bibasilar rales and crepitations, no wheezing  HEART: Regular rate and rhythm. HOMER II/VI at LPSB, No gallop, no rub   ABDOMEN: Soft, Nontender, non distended, BS+nt, No flank tenderness.   EXTREMITIES: Bilateral thigh and leg edema+nt  Neurology: AAOx1, unable to follow verbal commands  SKIN: No rashes or lesions          ACCESS: Right chest wall tunnel Hd catheter present. no bleeding    LABS:                        7.6    14.3  )-----------( 128      ( 07 May 2018 07:14 )             24.4     05-07    140  |  98  |  73<H>  ----------------------------<  159<H>  4.7   |  25  |  3.90<H>    Ca    8.3<L>      07 May 2018 17:01  Mg     2.2     05-07        PT/INR - ( 07 May 2018 17:03 )   PT: 33.5 sec;   INR: 2.95          PTT - ( 07 May 2018 17:03 )  PTT:43.9 sec          RADIOLOGY & ADDITIONAL STUDIES:    < from: Xray Chest 1 View- PORTABLE-Routine (05.05.18 @ 06:27) >    EXAM:  XR CHEST PORTABLE ROUTINE 1V                          PROCEDURE DATE:  05/05/2018                     INTERPRETATION:  Clinical History: Respiratory failure    Portable examination the chest demonstrates no interval change lung   pathology in comparison to prior examination of the chest 5/4/2018. No   interval change position remaining support devices.    Impression: No interval change lung pathology            "Thank you for the opportunity to participate in the care of this   patient."        RADHA MAXWELL M.D., ATTENDING RADIOLOGIST  This document has been electronically signed. May  5 2018 11:45AM                  < end of copied text >

## 2018-05-08 NOTE — PROGRESS NOTE ADULT - ASSESSMENT
63M PMH HFrEF 10-15% (ischemic), MI, s/p AICD vs PPM, possible Afib, HTN, DM2 on insulin, possible CKD, and gout, who presented with a chief complaint of generalized weakness s/p septic shock likely 2/2 LE cellulitis complicated by ATN requiring dialysis. Course complicated by AMS likely from brainstem infarct 2/2 LV thrombus and/or toxic metabolic encephalopathy and found to have candidemia and sepsis 2/2 klebsiella bacteremia s/p fluconazole and CTX. Course further complicated by aspiration PNA and completed course with Zosyn- now off abx. Further complication of hypotension with difficulty tolerating HD. Further goals of care discussion for which patient made DNR, but with continued escalation of care. 63M PMH HFrEF 10-15% (ischemic), MI, s/p AICD vs PPM, possible Afib, HTN, DM2 on insulin, possible CKD, and gout, who presented with a chief complaint of generalized weakness s/p septic shock likely 2/2 LE cellulitis complicated by ATN requiring dialysis. Course complicated by AMS likely from brainstem infarct 2/2 LV thrombus and/or toxic metabolic encephalopathy and found to have candidemia and sepsis 2/2 klebsiella bacteremia s/p fluconazole and CTX. Course further complicated by aspiration PNA and completed course with Zosyn- now off abx. Further complication of hypotension with difficulty tolerating HD. Now with chronic respiratory failure. Further goals of care discussion for which patient made DNR, but with continued escalation of care.

## 2018-05-08 NOTE — PROGRESS NOTE ADULT - PROBLEM SELECTOR PLAN 1
Patient with Oligoanuric GILMER due to ischemic ATN on RRT now via Right chest wall tunnel HD catheter.    Last HD on 5/7 with no UF and clearance only treatment.  Will defer HD treatment for now  Volume status hypervolemic but unable to tolerated UF due to hypotension  IV albumin during HD treatment.  Goal of care discussion with family.

## 2018-05-08 NOTE — PROGRESS NOTE ADULT - PROBLEM SELECTOR PLAN 4
In setting of septic/cardiogenic shock-renal failure likely 2/2 to ischemic ATN with hypoperfusion. Baseline unknown but presenting Cr 3.93 with associated metabolic derangements. Started on HD during course with renal following. Permacath replaced multiple times over course for bacteremia and fungemia. Now with L subclavian HD cath. Last HD yesterday- with only filtration- no volume removed-Last full HD session on Wednesday May 2- as patient with difficulty tolerating even with restarting albumin. Cr at 3.22 today.  -F/u Renal regarding recommendations, No HD today.   -Monitor for electrolyte derangements.     #Adrenal Insufficiency- previously on fludrocortisone and prednisone. Previously started stress dose steroids in setting of acute infection/septic shock. Attempting to wean down.  -As above, switched to prednisone 20mg BID    #Elevated AG  Lactate negative. Glucose with more difficulty controlling- with multiple insulin adjustments and switched back to glucerna. Now on Lantus 22 and Humulin increased to 8 from 4U. Given that patient has been poorly tolerating HD may be more likely 2/2 uremia in setting of ESRD.  - Monitor BMP for electrolyte abnormalities.   - Improve glucose control.

## 2018-05-08 NOTE — PROGRESS NOTE ADULT - SUBJECTIVE AND OBJECTIVE BOX
PROGRESS NOTE    CC:  Overnight Events:  Interval History:   ROS:    OBJECTIVE  Vitals:  T(C): 36.5 (05-08-18 @ 06:00), Max: 36.8 (05-07-18 @ 23:00)  HR: 86 (05-08-18 @ 05:26) (65 - 118)  BP: 91/69 (05-08-18 @ 05:26) (81/58 - 105/76)  RR: 18 (05-08-18 @ 05:26) (11 - 20)  SpO2: 100% (05-08-18 @ 05:26) (92% - 100%)  Wt(kg): --  Mode: AC/ CMV (Assist Control/ Continuous Mandatory Ventilation)  RR (machine): 10  TV (machine): 500  FiO2: 40  PEEP: 5  ITime: 1  MAP: 7.7  PIP: 19    I/O:  I&O's Summary    07 May 2018 07:01  -  08 May 2018 07:00  --------------------------------------------------------  IN: 2120 mL / OUT: 0 mL / NET: 2120 mL        PHYSICAL EXAM:  Appearance: NAD. Speaking in full sentences.   HEENT:   Conjunctiva clear b/l. Moist oral mucosa.  Cardiovascular: RRR with no murmurs.  Respiratory: Lungs CTAB.   Gastrointestinal:  Soft, nontender. Non-distended. Non-rigid.	  Extremities: No edema b/l. No erythema b/l. LE WWP b/l.  Vascular: DP 2+ b/l.  Neurologic:  Alert and awake. Moving all extremities. Following commands. Making eye contact.  	  LABS:                        7.6    14.3  )-----------( 128      ( 07 May 2018 07:14 )             24.4     05-07    140  |  98  |  73<H>  ----------------------------<  159<H>  4.7   |  25  |  3.90<H>    Ca    8.3<L>      07 May 2018 17:01  Mg     2.2     05-07      PT/INR - ( 07 May 2018 17:03 )   PT: 33.5 sec;   INR: 2.95          PTT - ( 07 May 2018 17:03 )  PTT:43.9 sec      RADIOLOGY & ADDITIONAL TESTS:  Reviewed .    MEDICATIONS  (STANDING):  aspirin  chewable 81 milliGRAM(s) Oral daily  atorvastatin 80 milliGRAM(s) Oral at bedtime  chlorhexidine 0.12% Liquid 15 milliLiter(s) Swish and Spit two times a day  chlorhexidine 2% Cloths 1 Application(s) Topical daily  dextrose 5%. 1000 milliLiter(s) (50 mL/Hr) IV Continuous <Continuous>  dextrose 50% Injectable 12.5 Gram(s) IV Push once  dextrose 50% Injectable 25 Gram(s) IV Push once  dextrose 50% Injectable 25 Gram(s) IV Push once  ergocalciferol Drops 6000 Unit(s) Oral daily  escitalopram 5 milliGRAM(s) Oral daily  folic acid 1 milliGRAM(s) Oral daily  insulin glargine Injectable (LANTUS) 22 Unit(s) SubCutaneous at bedtime  insulin regular  human corrective regimen sliding scale   SubCutaneous every 6 hours  insulin regular  human recombinant 4 Unit(s) SubCutaneous every 6 hours  levETIRAcetam  Solution 500 milliGRAM(s) Oral every 24 hours  metoclopramide 5 milliGRAM(s) Oral every 6 hours  midodrine 15 milliGRAM(s) Oral every 8 hours  modafinil 100 milliGRAM(s) Oral <User Schedule>  multivitamin 1 Tablet(s) Oral daily  pantoprazole   Suspension 40 milliGRAM(s) Oral daily  predniSONE   Tablet 15 milliGRAM(s) Oral every 8 hours  silver sulfADIAZINE 1% Cream 1 Application(s) Topical daily  thiamine 100 milliGRAM(s) Oral daily    MEDICATIONS  (PRN):  acetaminophen    Suspension. 650 milliGRAM(s) Oral every 6 hours PRN Moderate Pain (4 - 6)  acetaminophen  Suppository 650 milliGRAM(s) Rectal every 6 hours PRN For Temp greater than 38 C (100.4 F)  dextrose Gel 1 Dose(s) Oral once PRN Blood Glucose LESS THAN 70 milliGRAM(s)/deciliter  glucagon  Injectable 1 milliGRAM(s) IntraMuscular once PRN Glucose LESS THAN 70 milligrams/deciliter      ASSESSMENT:    PLAN: PROGRESS NOTE    CC:Septic/cardiogenic shock, ATN on HD, CVA  Overnight Events: Transient tachycardia to 120, with resolution.  Interval History:  Unable to assess. Spontaneous eye opening. Intermittently following commands- squeezes hand- unable to answer questions appropriately.   ROS: As above.      OBJECTIVE  Vitals:  T(C): 36.5 (05-08-18 @ 06:00), Max: 36.8 (05-07-18 @ 23:00)  HR: 86 (05-08-18 @ 05:26) (65 - 118)  BP: 91/69 (05-08-18 @ 05:26) (81/58 - 105/76)  RR: 18 (05-08-18 @ 05:26) (11 - 20)  SpO2: 100% (05-08-18 @ 05:26) (92% - 100%)  Wt(kg): --  Mode: AC/ CMV (Assist Control/ Continuous Mandatory Ventilation)  RR (machine): 10  TV (machine): 500  FiO2: 40  PEEP: 5  ITime: 1  MAP: 7.7  PIP: 19    I/O:  I&O's Summary    07 May 2018 07:01  -  08 May 2018 07:00  --------------------------------------------------------  IN: 2120 mL / OUT: 0 mL / NET: 2120 mL        PHYSICAL EXAM:  Appearance: NAD. With trach and peg. Lethargic but more arousable today.  HEENT:  Difficulty assessing eyes- minimal eye opening. Conjunctiva clear b/l. Moist oral mucosa.  Cardiovascular: IRregularly irregular, S1, S2 no murmurs appreciated.   Respiratory: Lungs with b/l coarse breath sounds- improved from last exam. Trach in place.   Gastrointestinal:  Soft, nontender. Mild distension noted. PEG in place. + Bowel sounds.	  Extremities: 2+ edema b/l to mid calf. 2+ dependent edema. No erythema b/l. LE WWP b/l.  Vascular: DP diminished  Neurologic:  Lethargic,  more arousable to verbal or painful stimuli. Spontaneous eye opening. Minimal command following- squeezes R hand. Withdraws all extremities to pain, but LUE more contracted compared to R. Spontaneously moving RUE.   	  LABS:                        7.6    14.3  )-----------( 128      ( 07 May 2018 07:14 )             24.4     05-07    140  |  98  |  73<H>  ----------------------------<  159<H>  4.7   |  25  |  3.90<H>    Ca    8.3<L>      07 May 2018 17:01  Mg     2.2     05-07      PT/INR - ( 07 May 2018 17:03 )   PT: 33.5 sec;   INR: 2.95          PTT - ( 07 May 2018 17:03 )  PTT:43.9 sec      RADIOLOGY & ADDITIONAL TESTS:  Reviewed .    MEDICATIONS  (STANDING):  aspirin  chewable 81 milliGRAM(s) Oral daily  atorvastatin 80 milliGRAM(s) Oral at bedtime  chlorhexidine 0.12% Liquid 15 milliLiter(s) Swish and Spit two times a day  chlorhexidine 2% Cloths 1 Application(s) Topical daily  dextrose 5%. 1000 milliLiter(s) (50 mL/Hr) IV Continuous <Continuous>  dextrose 50% Injectable 12.5 Gram(s) IV Push once  dextrose 50% Injectable 25 Gram(s) IV Push once  dextrose 50% Injectable 25 Gram(s) IV Push once  ergocalciferol Drops 6000 Unit(s) Oral daily  escitalopram 5 milliGRAM(s) Oral daily  folic acid 1 milliGRAM(s) Oral daily  insulin glargine Injectable (LANTUS) 22 Unit(s) SubCutaneous at bedtime  insulin regular  human corrective regimen sliding scale   SubCutaneous every 6 hours  insulin regular  human recombinant 4 Unit(s) SubCutaneous every 6 hours  levETIRAcetam  Solution 500 milliGRAM(s) Oral every 24 hours  metoclopramide 5 milliGRAM(s) Oral every 6 hours  midodrine 15 milliGRAM(s) Oral every 8 hours  modafinil 100 milliGRAM(s) Oral <User Schedule>  multivitamin 1 Tablet(s) Oral daily  pantoprazole   Suspension 40 milliGRAM(s) Oral daily  predniSONE   Tablet 15 milliGRAM(s) Oral every 8 hours  silver sulfADIAZINE 1% Cream 1 Application(s) Topical daily  thiamine 100 milliGRAM(s) Oral daily    MEDICATIONS  (PRN):  acetaminophen    Suspension. 650 milliGRAM(s) Oral every 6 hours PRN Moderate Pain (4 - 6)  acetaminophen  Suppository 650 milliGRAM(s) Rectal every 6 hours PRN For Temp greater than 38 C (100.4 F)  dextrose Gel 1 Dose(s) Oral once PRN Blood Glucose LESS THAN 70 milliGRAM(s)/deciliter  glucagon  Injectable 1 milliGRAM(s) IntraMuscular once PRN Glucose LESS THAN 70 milligrams/deciliter      ASSESSMENT:    PLAN: PROGRESS NOTE    CC:Septic/cardiogenic shock, ATN on HD, CVA  Overnight Events: Transient tachycardia to 120, with resolution.  Interval History:  Unable to assess. Spontaneous eye opening. Intermittently following commands- squeezes hand- unable to answer questions appropriately.   ROS: As above.      OBJECTIVE  Vitals:  T(C): 36.5 (05-08-18 @ 06:00), Max: 36.8 (05-07-18 @ 23:00)  HR: 86 (05-08-18 @ 05:26) (65 - 118)  BP: 91/69 (05-08-18 @ 05:26) (81/58 - 105/76)  RR: 18 (05-08-18 @ 05:26) (11 - 20)  SpO2: 100% (05-08-18 @ 05:26) (92% - 100%)  Wt(kg): --  Mode: AC/ CMV (Assist Control/ Continuous Mandatory Ventilation)  RR (machine): 10  TV (machine): 500  FiO2: 40  PEEP: 5  ITime: 1  MAP: 7.7  PIP: 19    I/O:  I&O's Summary    07 May 2018 07:01  -  08 May 2018 07:00  --------------------------------------------------------  IN: 2120 mL / OUT: 0 mL / NET: 2120 mL        PHYSICAL EXAM:  Appearance: NAD. With trach and peg. Lethargic but more arousable today.  HEENT:  Difficulty assessing eyes- minimal eye opening. Conjunctiva clear b/l. Moist oral mucosa.  Cardiovascular: IRregularly irregular, S1, S2 no murmurs appreciated.   Respiratory: Lungs with b/l coarse breath sounds- improved from last exam. Trach in place.   Gastrointestinal:  Soft, nontender. Mild distension noted. PEG in place. + Bowel sounds.	  Extremities: 2+ edema b/l to mid calf. 2+ dependent edema. No erythema b/l. LE WWP b/l.  Vascular: DP diminished  Neurologic:  Lethargic,  more arousable to verbal or painful stimuli. Spontaneous eye opening. Minimal command following- squeezes R hand. Withdraws all extremities to pain, but LUE more contracted compared to R. Spontaneously moving RUE.   Skin: stasis LE changes  	  LABS:                        7.6    14.3  )-----------( 128      ( 07 May 2018 07:14 )             24.4     05-07    140  |  98  |  73<H>  ----------------------------<  159<H>  4.7   |  25  |  3.90<H>    Ca    8.3<L>      07 May 2018 17:01  Mg     2.2     05-07      PT/INR - ( 07 May 2018 17:03 )   PT: 33.5 sec;   INR: 2.95          PTT - ( 07 May 2018 17:03 )  PTT:43.9 sec      RADIOLOGY & ADDITIONAL TESTS:  Reviewed .    MEDICATIONS  (STANDING):  aspirin  chewable 81 milliGRAM(s) Oral daily  atorvastatin 80 milliGRAM(s) Oral at bedtime  chlorhexidine 0.12% Liquid 15 milliLiter(s) Swish and Spit two times a day  chlorhexidine 2% Cloths 1 Application(s) Topical daily  dextrose 5%. 1000 milliLiter(s) (50 mL/Hr) IV Continuous <Continuous>  dextrose 50% Injectable 12.5 Gram(s) IV Push once  dextrose 50% Injectable 25 Gram(s) IV Push once  dextrose 50% Injectable 25 Gram(s) IV Push once  ergocalciferol Drops 6000 Unit(s) Oral daily  escitalopram 5 milliGRAM(s) Oral daily  folic acid 1 milliGRAM(s) Oral daily  insulin glargine Injectable (LANTUS) 22 Unit(s) SubCutaneous at bedtime  insulin regular  human corrective regimen sliding scale   SubCutaneous every 6 hours  insulin regular  human recombinant 4 Unit(s) SubCutaneous every 6 hours  levETIRAcetam  Solution 500 milliGRAM(s) Oral every 24 hours  metoclopramide 5 milliGRAM(s) Oral every 6 hours  midodrine 15 milliGRAM(s) Oral every 8 hours  modafinil 100 milliGRAM(s) Oral <User Schedule>  multivitamin 1 Tablet(s) Oral daily  pantoprazole   Suspension 40 milliGRAM(s) Oral daily  predniSONE   Tablet 15 milliGRAM(s) Oral every 8 hours  silver sulfADIAZINE 1% Cream 1 Application(s) Topical daily  thiamine 100 milliGRAM(s) Oral daily    MEDICATIONS  (PRN):  acetaminophen    Suspension. 650 milliGRAM(s) Oral every 6 hours PRN Moderate Pain (4 - 6)  acetaminophen  Suppository 650 milliGRAM(s) Rectal every 6 hours PRN For Temp greater than 38 C (100.4 F)  dextrose Gel 1 Dose(s) Oral once PRN Blood Glucose LESS THAN 70 milliGRAM(s)/deciliter  glucagon  Injectable 1 milliGRAM(s) IntraMuscular once PRN Glucose LESS THAN 70 milligrams/deciliter      ASSESSMENT:

## 2018-05-08 NOTE — PROGRESS NOTE ADULT - PROBLEM SELECTOR PLAN 5
Likely 2/2 phlebotomy and CKD, No bloody output. Prior studies showed Anemia of chronic disease. Hgb at 7.6 for which patient given 1U prbc during HD yesterday. Today at hgb 9.6.   - monitor CBC, transfuse if Hgb <7  -Keep active T&S, Monitor H/H

## 2018-05-08 NOTE — PROGRESS NOTE ADULT - PROBLEM SELECTOR PLAN 1
ongoing issues with hypotension is limiting the extent and duration of pts HD treatments.  HCP aware of issues re: same.

## 2018-05-08 NOTE — PROGRESS NOTE ADULT - PROBLEM SELECTOR PLAN 10
VTE: Coumadin, dose for   GI PPx: PPI    FULL CODE    F: No IVF in setting of HD.  E: Replete electrolytes with caution in setting of HD.   N:  Restarted on tube feeds with Vital 1.5 Terrell at 63cc/hr    Dispo: Initially admitted to MICU but now on 7LACH for further management of hypotension in setting of shock, acute encephalopathy, bacteremia and acute on chronic respiratory failure. Course complicated by Septic shock 2/2 to aspiration PNA for which patient now off levophed. Now on 7LACH for further management of aspiraton PNA, chronic respiratory failure, hypotension and glucose control. Dispo Pending L-tACH with clinical stabilization. VTE: Coumadin, dose for 2.5mg tonight.  GI PPx: PPI    DNR- Made DNR today with family meeting with palliative. Wants to continue with escalation of care.     F: No IVF in setting of HD.  E: Replete electrolytes with caution in setting of HD.   N: Glucerna 1.5 at 50cc/hr    Dispo: Initially admitted to MICU but now on 7LACH for further management of hypotension in setting of shock, acute encephalopathy, bacteremia and acute on chronic respiratory failure. Course complicated by Septic shock 2/2 to aspiration PNA for which patient now off levophed. Now on 7LACH for further management of aspiraton PNA, chronic respiratory failure, hypotension and poor glucose control. Today made DNR with palliative care. Pending further medical optimization for HD and further dispo planning.

## 2018-05-08 NOTE — PROGRESS NOTE ADULT - PROBLEM SELECTOR PLAN 1
Presented initially with septic shock, has completed course of abx for bacteremia with Klebsiella as well as fungemia. Further complicated by septic shock 2/2 to aspiration with increasing O2 requirements while on 7lACH and transferred back to MICU. Weaned off pressors and back on 7LACH for which patient Completed additional 10 day course of Zosyn.   -Monitor for signs/symptoms of infection. Noted worsening pleural effusion. WBC today at 17.5.     #Fungemia  Noted to have Candida Tropicalis growing in blood cultures and completed fluconazole course. Resolved.

## 2018-05-09 LAB
ANION GAP SERPL CALC-SCNC: 23 MMOL/L — HIGH (ref 5–17)
BLD GP AB SCN SERPL QL: NEGATIVE — SIGNIFICANT CHANGE UP
BUN SERPL-MCNC: 72 MG/DL — HIGH (ref 7–23)
CALCIUM SERPL-MCNC: 8.7 MG/DL — SIGNIFICANT CHANGE UP (ref 8.4–10.5)
CHLORIDE SERPL-SCNC: 94 MMOL/L — LOW (ref 96–108)
CO2 SERPL-SCNC: 21 MMOL/L — LOW (ref 22–31)
CREAT SERPL-MCNC: 3.62 MG/DL — HIGH (ref 0.5–1.3)
CULTURE RESULTS: SIGNIFICANT CHANGE UP
CULTURE RESULTS: SIGNIFICANT CHANGE UP
GLUCOSE BLDC GLUCOMTR-MCNC: 108 MG/DL — HIGH (ref 70–99)
GLUCOSE BLDC GLUCOMTR-MCNC: 179 MG/DL — HIGH (ref 70–99)
GLUCOSE BLDC GLUCOMTR-MCNC: 207 MG/DL — HIGH (ref 70–99)
GLUCOSE BLDC GLUCOMTR-MCNC: 72 MG/DL — SIGNIFICANT CHANGE UP (ref 70–99)
GLUCOSE BLDC GLUCOMTR-MCNC: 86 MG/DL — SIGNIFICANT CHANGE UP (ref 70–99)
GLUCOSE SERPL-MCNC: 203 MG/DL — HIGH (ref 70–99)
HCT VFR BLD CALC: 32.5 % — LOW (ref 39–50)
HGB BLD-MCNC: 10.1 G/DL — LOW (ref 13–17)
INR BLD: 2.81 — HIGH (ref 0.88–1.16)
MAGNESIUM SERPL-MCNC: 2.1 MG/DL — SIGNIFICANT CHANGE UP (ref 1.6–2.6)
MCHC RBC-ENTMCNC: 27.9 PG — SIGNIFICANT CHANGE UP (ref 27–34)
MCHC RBC-ENTMCNC: 31.1 G/DL — LOW (ref 32–36)
MCV RBC AUTO: 89.8 FL — SIGNIFICANT CHANGE UP (ref 80–100)
PHOSPHATE SERPL-MCNC: 6.4 MG/DL — HIGH (ref 2.5–4.5)
PLATELET # BLD AUTO: 157 K/UL — SIGNIFICANT CHANGE UP (ref 150–400)
POTASSIUM SERPL-MCNC: 5.2 MMOL/L — SIGNIFICANT CHANGE UP (ref 3.5–5.3)
POTASSIUM SERPL-SCNC: 5.2 MMOL/L — SIGNIFICANT CHANGE UP (ref 3.5–5.3)
PROTHROM AB SERPL-ACNC: 31.8 SEC — HIGH (ref 9.8–12.7)
RBC # BLD: 3.62 M/UL — LOW (ref 4.2–5.8)
RBC # FLD: 17 % — HIGH (ref 10.3–16.9)
RH IG SCN BLD-IMP: POSITIVE — SIGNIFICANT CHANGE UP
SODIUM SERPL-SCNC: 138 MMOL/L — SIGNIFICANT CHANGE UP (ref 135–145)
SPECIMEN SOURCE: SIGNIFICANT CHANGE UP
SPECIMEN SOURCE: SIGNIFICANT CHANGE UP
WBC # BLD: 14.3 K/UL — HIGH (ref 3.8–10.5)
WBC # FLD AUTO: 14.3 K/UL — HIGH (ref 3.8–10.5)

## 2018-05-09 PROCEDURE — 99233 SBSQ HOSP IP/OBS HIGH 50: CPT

## 2018-05-09 PROCEDURE — 99233 SBSQ HOSP IP/OBS HIGH 50: CPT | Mod: GC

## 2018-05-09 PROCEDURE — 71045 X-RAY EXAM CHEST 1 VIEW: CPT | Mod: 26

## 2018-05-09 PROCEDURE — 90935 HEMODIALYSIS ONE EVALUATION: CPT | Mod: GC

## 2018-05-09 RX ORDER — DOXERCALCIFEROL 2.5 UG/1
2 CAPSULE ORAL ONCE
Qty: 0 | Refills: 0 | Status: COMPLETED | OUTPATIENT
Start: 2018-05-09 | End: 2018-05-09

## 2018-05-09 RX ORDER — WARFARIN SODIUM 2.5 MG/1
2.5 TABLET ORAL ONCE
Qty: 0 | Refills: 0 | Status: COMPLETED | OUTPATIENT
Start: 2018-05-09 | End: 2018-05-09

## 2018-05-09 RX ORDER — DEXTROSE 50 % IN WATER 50 %
25 SYRINGE (ML) INTRAVENOUS ONCE
Qty: 0 | Refills: 0 | Status: COMPLETED | OUTPATIENT
Start: 2018-05-09 | End: 2018-05-09

## 2018-05-09 RX ORDER — ERYTHROPOIETIN 10000 [IU]/ML
10000 INJECTION, SOLUTION INTRAVENOUS; SUBCUTANEOUS ONCE
Qty: 0 | Refills: 0 | Status: COMPLETED | OUTPATIENT
Start: 2018-05-09 | End: 2018-05-09

## 2018-05-09 RX ORDER — ALBUMIN HUMAN 25 %
50 VIAL (ML) INTRAVENOUS
Qty: 0 | Refills: 0 | Status: COMPLETED | OUTPATIENT
Start: 2018-05-09 | End: 2018-05-09

## 2018-05-09 RX ADMIN — MIDODRINE HYDROCHLORIDE 15 MILLIGRAM(S): 2.5 TABLET ORAL at 16:58

## 2018-05-09 RX ADMIN — CHLORHEXIDINE GLUCONATE 15 MILLILITER(S): 213 SOLUTION TOPICAL at 10:20

## 2018-05-09 RX ADMIN — INSULIN HUMAN 2: 100 INJECTION, SOLUTION SUBCUTANEOUS at 15:37

## 2018-05-09 RX ADMIN — CHLORHEXIDINE GLUCONATE 15 MILLILITER(S): 213 SOLUTION TOPICAL at 22:04

## 2018-05-09 RX ADMIN — CHLORHEXIDINE GLUCONATE 1 APPLICATION(S): 213 SOLUTION TOPICAL at 06:01

## 2018-05-09 RX ADMIN — Medication 650 MILLIGRAM(S): at 19:37

## 2018-05-09 RX ADMIN — Medication 25 MILLILITER(S): at 19:37

## 2018-05-09 RX ADMIN — WARFARIN SODIUM 2.5 MILLIGRAM(S): 2.5 TABLET ORAL at 22:04

## 2018-05-09 RX ADMIN — ERYTHROPOIETIN 10000 UNIT(S): 10000 INJECTION, SOLUTION INTRAVENOUS; SUBCUTANEOUS at 10:42

## 2018-05-09 RX ADMIN — Medication 50 MILLILITER(S): at 12:38

## 2018-05-09 RX ADMIN — INSULIN HUMAN 8 UNIT(S): 100 INJECTION, SOLUTION SUBCUTANEOUS at 06:27

## 2018-05-09 RX ADMIN — Medication 1 MILLIGRAM(S): at 15:10

## 2018-05-09 RX ADMIN — Medication 50 MILLILITER(S): at 11:41

## 2018-05-09 RX ADMIN — DOXERCALCIFEROL 2 MICROGRAM(S): 2.5 CAPSULE ORAL at 10:41

## 2018-05-09 RX ADMIN — MODAFINIL 100 MILLIGRAM(S): 200 TABLET ORAL at 06:01

## 2018-05-09 RX ADMIN — Medication 50 MILLILITER(S): at 10:42

## 2018-05-09 RX ADMIN — ERGOCALCIFEROL 6000 UNIT(S): 1.25 CAPSULE ORAL at 15:09

## 2018-05-09 RX ADMIN — INSULIN GLARGINE 22 UNIT(S): 100 INJECTION, SOLUTION SUBCUTANEOUS at 22:05

## 2018-05-09 RX ADMIN — MIDODRINE HYDROCHLORIDE 15 MILLIGRAM(S): 2.5 TABLET ORAL at 22:08

## 2018-05-09 RX ADMIN — INSULIN HUMAN 8 UNIT(S): 100 INJECTION, SOLUTION SUBCUTANEOUS at 15:33

## 2018-05-09 RX ADMIN — MODAFINIL 100 MILLIGRAM(S): 200 TABLET ORAL at 15:14

## 2018-05-09 RX ADMIN — LEVETIRACETAM 500 MILLIGRAM(S): 250 TABLET, FILM COATED ORAL at 15:09

## 2018-05-09 RX ADMIN — Medication 1 APPLICATION(S): at 15:33

## 2018-05-09 RX ADMIN — Medication 100 MILLIGRAM(S): at 15:10

## 2018-05-09 RX ADMIN — INSULIN HUMAN 4: 100 INJECTION, SOLUTION SUBCUTANEOUS at 06:27

## 2018-05-09 RX ADMIN — Medication 5 MILLIGRAM(S): at 03:55

## 2018-05-09 RX ADMIN — ATORVASTATIN CALCIUM 80 MILLIGRAM(S): 80 TABLET, FILM COATED ORAL at 22:04

## 2018-05-09 RX ADMIN — Medication 5 MILLIGRAM(S): at 22:03

## 2018-05-09 RX ADMIN — Medication 20 MILLIGRAM(S): at 06:01

## 2018-05-09 RX ADMIN — Medication 650 MILLIGRAM(S): at 20:19

## 2018-05-09 RX ADMIN — Medication 5 MILLIGRAM(S): at 15:09

## 2018-05-09 RX ADMIN — ESCITALOPRAM OXALATE 5 MILLIGRAM(S): 10 TABLET, FILM COATED ORAL at 15:13

## 2018-05-09 RX ADMIN — Medication 15 MILLIGRAM(S): at 18:49

## 2018-05-09 RX ADMIN — Medication 1 TABLET(S): at 15:10

## 2018-05-09 RX ADMIN — MIDODRINE HYDROCHLORIDE 15 MILLIGRAM(S): 2.5 TABLET ORAL at 04:32

## 2018-05-09 RX ADMIN — Medication 81 MILLIGRAM(S): at 15:10

## 2018-05-09 RX ADMIN — PANTOPRAZOLE SODIUM 40 MILLIGRAM(S): 20 TABLET, DELAYED RELEASE ORAL at 15:10

## 2018-05-09 NOTE — PROGRESS NOTE ADULT - PROBLEM SELECTOR PLAN 1
pt is having HD with fluid removal for the first time this week. Was able to tolerate HD with albumin supplementation

## 2018-05-09 NOTE — PROGRESS NOTE ADULT - PROBLEM SELECTOR PLAN 1
Patient with Oligoanuric GILMER due to ischemic ATN on RRT now via Right chest wall tunnel HD catheter.    Last HD on 5/7 with no UF and clearance only treatment.  Will do HD treatment for UF and cleareance as tolerated with IV albumin and po midodrine for now.  Volume status hypervolemic, K: 5.2  Goal of care discussion with family.

## 2018-05-09 NOTE — PROGRESS NOTE ADULT - PROBLEM SELECTOR PLAN 8
CHF with EF 10-15% 2/2 ischemic cardiomyopathy s/p AICD. Elevated BNP on admission with R sided effusion and edema. Patient with persistent pleural effusions on CXR and US and b/l dependent edema.   - Maintain negative fluid balance with dialysis  - c/w holding ACE/BB in setting of sepsis/ hypotension on midodrine    #CAD- Hx of MI and ischemic CM s/p AICD, STEMI 7/2017, was started on Aspirin and Brilinta.   - c/w aspirin 81 and Atorvastatin 80mg qhs. CHF with EF 10-15% 2/2 ischemic cardiomyopathy s/p AICD. Elevated BNP on admission with R sided effusion and edema. Patient with persistent pleural effusions on CXR and US and b/l dependent edema.   - c/w holding ACE/BB in setting of sepsis/ hypotension on midodrine  - HD for fluid balance, though difficulty tolerating-    #CAD- Hx of MI and ischemic CM s/p AICD, STEMI 7/2017, was started on Aspirin and Brilinta.   - c/w aspirin 81 and Atorvastatin 80mg qhs. CHF with EF 10-15% 2/2 ischemic cardiomyopathy s/p AICD. Elevated BNP on admission with R sided effusion and edema. Patient with persistent pleural effusions on CXR and US and b/l dependent edema.   - c/w holding ACE/BB in setting of sepsis/ hypotension on midodrine  - HD for fluid balance, though difficulty tolerating. Plan for HD today.    #CAD- Hx of MI and ischemic CM s/p AICD, STEMI 7/2017, was started on Aspirin and Brilinta.   - c/w aspirin 81 and Atorvastatin 80mg qhs.

## 2018-05-09 NOTE — PROGRESS NOTE ADULT - PROBLEM SELECTOR PLAN 10
VTE: Coumadin, dose for 2.5mg tonight.  GI PPx: PPI    DNR- Made DNR today with family meeting with palliative. Wants to continue with escalation of care.     F: No IVF in setting of HD.  E: Replete electrolytes with caution in setting of HD.   N: Glucerna 1.5 at 50cc/hr    Dispo: Initially admitted to MICU but now on 7LACH for further management of hypotension in setting of shock, acute encephalopathy, bacteremia and acute on chronic respiratory failure. Course complicated by Septic shock 2/2 to aspiration PNA for which patient now off levophed. Now on 7LACH for further management of aspiraton PNA, chronic respiratory failure, hypotension and poor glucose control. Today made DNR with palliative care. Pending further medical optimization for HD and further dispo planning. VTE: Coumadin, dose for 2.5mg (INR 2.81)  GI PPx: PPI    DNR- Made DNR, family wants escalation of care, pressors if needed.    F: No IVF in setting of HD.  E: Replete electrolytes with caution in setting of HD.   N: Glucerna 1.5 at 50cc/hr    Dispo: Initially admitted to MICU but now on 7LACH for further management of hypotension in setting of shock, acute encephalopathy, bacteremia and acute on chronic respiratory failure. Course complicated by Septic shock 2/2 to aspiration PNA for which patient now off levophed. Now on 7LACH for further management of aspiraton PNA, chronic respiratory failure, hypotension and poor glucose control. DNR with palliative care. Pending further medical optimization for HD and further dispo planning. VTE: Coumadin, dose for 2.5mg (INR 2.81)  GI PPx: PPI    DNR- Made DNR, family wants escalation of care, pressors if needed.    F: No IVF in setting of HD.  E: Replete electrolytes with caution in setting of HD.   N: Glucerna 1.5 at 50cc/hr    Dispo: Initially admitted to MICU but now on 7LACH for further management of hypotension in setting of shock, acute encephalopathy, bacteremia and acute on chronic respiratory failure. Course complicated by Septic shock 2/2 to aspiration PNA for which patient now off levophed. Now on 7LACH for further management of aspiraton PNA, chronic respiratory failure, hypotension and poor glucose control. DNR with palliative care. Pending further medical optimization for HD- plan for HD given improving blood pressure.

## 2018-05-09 NOTE — PROGRESS NOTE ADULT - SUBJECTIVE AND OBJECTIVE BOX
Patient is a 63y Male seen and evaluated at bedside.       acetaminophen    Suspension. 650 every 6 hours PRN  acetaminophen  Suppository 650 every 6 hours PRN  albumin human 25% IVPB 50 every 1 hour  aspirin  chewable 81 daily  atorvastatin 80 at bedtime  chlorhexidine 0.12% Liquid 15 two times a day  chlorhexidine 2% Cloths 1 daily  dextrose 5%. 1000 <Continuous>  dextrose 50% Injectable 12.5 once  dextrose 50% Injectable 25 once  dextrose 50% Injectable 25 once  dextrose Gel 1 once PRN  ergocalciferol Drops 6000 daily  escitalopram 5 daily  folic acid 1 daily  glucagon  Injectable 1 once PRN  insulin glargine Injectable (LANTUS) 22 at bedtime  insulin regular  human corrective regimen sliding scale  every 6 hours  insulin regular  human recombinant 8 every 6 hours  levETIRAcetam  Solution 500 every 24 hours  metoclopramide 5 every 6 hours  midodrine 15 every 8 hours  modafinil 100 <User Schedule>  multivitamin 1 daily  pantoprazole   Suspension 40 daily  predniSONE   Tablet 15 two times a day  silver sulfADIAZINE 1% Cream 1 daily  thiamine 100 daily  warfarin 2.5 once      Allergies    No Known Allergies    Intolerances        T(C): , Max: 36.6 (05-08-18 @ 17:28)  T(F): , Max: 97.8 (05-08-18 @ 17:28)  HR: 93 (05-09-18 @ 10:25)  BP: 100/77 (05-09-18 @ 10:25)  BP(mean): 70 (05-09-18 @ 08:23)  RR: 17 (05-09-18 @ 10:25)  SpO2: 100% (05-09-18 @ 10:25)  Wt(kg): --    05-08 @ 07:01  -  05-09 @ 07:00  --------------------------------------------------------  IN: 1800 mL / OUT: 0 mL / NET: 1800 mL    05-09 @ 07:01  -  05-09 @ 11:31  --------------------------------------------------------  IN: 200 mL / OUT: 0 mL / NET: 200 mL          Review of Systems:  CONSTITUTIONAL: No fever or chills, No fatigue or tiredness.  EYES: No blurred or double vision.  RESPIRATORY: No shortness of breath, cough, hemoptysis  CARDIOVASCULAR: No Chest pain or shortness of breath  GASTROINTESTINAL: NO abdominal or flank pain, No nausea or vomiting, No diarrhea  GENITOURINARY: No dysuria or urinary burning, No difficulty passing urine, No hematuria  NEUROLOGICAL: No headaches or blurred vision  SKIN: No skin rashes   MUSCULOSKELETAL: No arthralgia, Joint pain, leg edema, No muscle pains      PHYSICAL EXAM:  GENERAL: NAD, well-developed, well nourished, alert, awake, no acute distress at present  HEAD:  Atraumatic, Normocephalic,   EYES: Bilateral conjuctiva and sclera normal   Oral cavity: Oral mucosa dry and pink  NECK: Neck supple, No JVD  CHEST/LUNG: Clear to auscultation bilaterally; No wheeze, no rales, no crepitations  HEART: Regular rate and rhythm. HOMER II/VI at LPSB, No gallop, no rub   ABDOMEN: Soft, Nontender, BS+nt, No flank tenderness.   EXTREMITIES: No clubbing, cyanosis, or edema  Neurology: AAOx3, no focal neurological deficit  SKIN: No rashes or lesions          ACCESS:     LABS:                        10.1   14.3  )-----------( 157      ( 09 May 2018 06:01 )             32.5     05-09    138  |  94<L>  |  72<H>  ----------------------------<  203<H>  5.2   |  21<L>  |  3.62<H>    Ca    8.7      09 May 2018 06:01  Mg     2.1     05-09        PT/INR - ( 09 May 2018 06:01 )   PT: 31.8 sec;   INR: 2.81          PTT - ( 07 May 2018 17:03 )  PTT:43.9 sec          RADIOLOGY & ADDITIONAL STUDIES: Patient is a 63y Male seen and evaluated at bedside. Patient lying in bed in no acute distress on ventilatory support through tracheostomy. Interval last HD treatment on 5/7 with no UF and hypotension and low blood pressure ranging from 75 systolic during HD. Chest xray with Large pleural effusion from 5/9    acetaminophen    Suspension. 650 every 6 hours PRN  acetaminophen  Suppository 650 every 6 hours PRN  albumin human 25% IVPB 50 every 1 hour  aspirin  chewable 81 daily  atorvastatin 80 at bedtime  chlorhexidine 0.12% Liquid 15 two times a day  chlorhexidine 2% Cloths 1 daily  dextrose 5%. 1000 <Continuous>  dextrose 50% Injectable 12.5 once  dextrose 50% Injectable 25 once  dextrose 50% Injectable 25 once  dextrose Gel 1 once PRN  ergocalciferol Drops 6000 daily  escitalopram 5 daily  folic acid 1 daily  glucagon  Injectable 1 once PRN  insulin glargine Injectable (LANTUS) 22 at bedtime  insulin regular  human corrective regimen sliding scale  every 6 hours  insulin regular  human recombinant 8 every 6 hours  levETIRAcetam  Solution 500 every 24 hours  metoclopramide 5 every 6 hours  midodrine 15 every 8 hours  modafinil 100 <User Schedule>  multivitamin 1 daily  pantoprazole   Suspension 40 daily  predniSONE   Tablet 15 two times a day  silver sulfADIAZINE 1% Cream 1 daily  thiamine 100 daily  warfarin 2.5 once      Allergies    No Known Allergies    Intolerances        T(C): , Max: 36.6 (05-08-18 @ 17:28)  T(F): , Max: 97.8 (05-08-18 @ 17:28)  HR: 93 (05-09-18 @ 10:25)  BP: 100/77 (05-09-18 @ 10:25)  BP(mean): 70 (05-09-18 @ 08:23)  RR: 17 (05-09-18 @ 10:25)  SpO2: 100% (05-09-18 @ 10:25)  Wt(kg): --    05-08 @ 07:01  -  05-09 @ 07:00  --------------------------------------------------------  IN: 1800 mL / OUT: 0 mL / NET: 1800 mL    05-09 @ 07:01  -  05-09 @ 11:31  --------------------------------------------------------  IN: 200 mL / OUT: 0 mL / NET: 200 mL    Review of Systems:  Limited. Patient remains non verbal and unable to follow verbal commands on ventilatory support.      PHYSICAL EXAM:  GENERAL: Ill appearing, lying in bed, non verbal on tracheostomy/ventilatory in no acute distress at present  HEAD:  Atraumatic, Normocephalic,   EYES: Bilateral conjuctival and scleral pallor+nt  Oral cavity: Oral mucosa moist and pale  NECK: Neck supple, mild JVP, Tracheostomy present.  CHEST/LUNG: Bilateral decreased breath sounds, Right>left, Bibasilar rales and crepitations, no wheezing  HEART: Regular rate and rhythm. HOMER II/VI at LPSB, No gallop, no rub   ABDOMEN: Soft, Nontender, non distended, BS+nt, No flank tenderness.   EXTREMITIES: Bilateral thigh and leg edema+nt  Neurology: AAOx1, Able to move extremities, Unable to completely follow verbal commands  SKIN: No rashes or lesions    ACCESS: Right chest wall tunnel HD catheter present.    LABS:                        10.1   14.3  )-----------( 157      ( 09 May 2018 06:01 )             32.5     05-09    138  |  94<L>  |  72<H>  ----------------------------<  203<H>  5.2   |  21<L>  |  3.62<H>    Ca    8.7      09 May 2018 06:01  Mg     2.1     05-09        PT/INR - ( 09 May 2018 06:01 )   PT: 31.8 sec;   INR: 2.81          PTT - ( 07 May 2018 17:03 )  PTT:43.9 sec          RADIOLOGY & ADDITIONAL STUDIES:  < from: Xray Chest 1 View- PORTABLE-Urgent (05.08.18 @ 08:40) >    EXAM:  XR CHEST PORTABLE URGENT 1V                          PROCEDURE DATE:  05/08/2018                     INTERPRETATION:  Portable chest    History: Shortness of breath pleural effusion follow-up abnormal exam    Right pleural effusion and consolidation left lung base unchanged   compared to prior exam 5/7/2018.            "Thank you for the opportunity to participate in the care of this   patient."        HESHAM BERRIOS M.D., ATTENDING RADIOLOGIST  This document has been electronically signed. May  8 2018  1:01PM                  < end of copied text >

## 2018-05-09 NOTE — PROGRESS NOTE ADULT - SUBJECTIVE AND OBJECTIVE BOX
Patient was seen and evaluated on dialysis.   Patient is tolerating the procedure well.   HR: 93 (05-09-18 @ 10:25)  BP: 100/77 (05-09-18 @ 10:25)  Continue dialysis:   Dialyzer: Revaclear 300         QB: 350       QD: 500 2K+  Goal UF 1 KG over 3.5 Hours

## 2018-05-09 NOTE — PROGRESS NOTE ADULT - PROBLEM SELECTOR PLAN 5
Support provided to patient and family. Patient to have access to supportive services during rest of hospital stay as the patient/family deemed necessary ie. Massage therapy, Patient and family supportive services, Palliative SW, etc.  As discussed during the palliative IDT meeting and as identified during the patients PSSA screening the patient would benefit from: massage therapy, music     Pt's HCP wants to continue current level of care, and escalate if needed but does not want resuscitation if pt were to arrest. There is not family consensus regarding pts level of care and overall goals.

## 2018-05-09 NOTE — PROGRESS NOTE ADULT - PROBLEM SELECTOR PLAN 9
Hx of Afib and LV thrombus on coumadin prior to admission. Most recent TTE with [facility] shows no LV thrombus currently.   - HR has remained stable, Lopressor has been held in setting of hypotension and plan for Dig for rate control if RVR- goal <110.  -Now therapeutic on coumadin- INR this AM at 2.93 (goal 2-3). C/w Coumadin 2.5mg to dose for tonight.      #LV thrombus  LV thrombus noted on RUKHSANA on 4/10 for which patient has been on hep gtt. C/w bridge.  -c/w Coumadin 2.5mg tonight (INR 2.93-therapeutic tonight- goal 2-3). Hx of Afib and LV thrombus on coumadin prior to admission. Most recent TTE with [facility] shows no LV thrombus currently.   - HR has remained stable, Lopressor has been held in setting of hypotension and plan for Dig for rate control if RVR- goal <110.  -Now therapeutic on coumadin- INR this AM at 2.81 (goal 2-3). C/w Coumadin 2.5mg       #LV thrombus  LV thrombus noted on RUKHSANA on 4/10 for which patient has been on hep gtt. C/w bridge.  -c/w Coumadin 2.5mg (INR 2.81-therapeutic- goal 2-3).

## 2018-05-09 NOTE — PROGRESS NOTE ADULT - PROBLEM SELECTOR PLAN 6
DM2 on Januvia at home. HbA1C 8.6. Has been on Lantus 42U qHS, insulin 7 units q6h. Difficulty controlling glucose levels and switched to glucerna with episode of hypoglycemia.    -Increased Lantus to 22U and increased Humulin 8U today. Monitor FS and adjust as needed. Though cautious with glucerna feeds for concern of worsening hyperkalemia. DM2 on Januvia at home. HbA1C 8.6. Has been on Lantus 42U qHS, insulin 7 units q6h. Difficulty controlling glucose levels and switched to glucerna with episode of hypoglycemia. DM2 on Januvia at home. HbA1C 8.6. Has been on Lantus 42U qHS, insulin 7 units q6h. Difficulty controlling glucose levels and switched to glucerna with episode of hypoglycemia. Currently on Lantus 22U and Humilin 8U. DM2 on Januvia at home. HbA1C 8.6. Has been on Lantus 42U qHS, insulin 7 units q6h. Difficulty controlling glucose levels and switched to glucerna with episode of hypoglycemia. Currently on Lantus 22U and Humilin 8U.  - Will adjust based on requirements over 24 hours.

## 2018-05-09 NOTE — PROGRESS NOTE ADULT - PROBLEM SELECTOR PLAN 3
Patient's Calcium 8.7 and No phosphorus available.  Continue hectoral during HD  Monitor calcium and phosphorus level

## 2018-05-09 NOTE — PROGRESS NOTE ADULT - ASSESSMENT
63M PMH HFrEF 10-15% (ischemic), MI, s/p AICD vs PPM, possible Afib, HTN, DM2 on insulin, possible CKD, and gout, who presented with a chief complaint of generalized weakness s/p septic shock likely 2/2 LE cellulitis complicated by ATN requiring dialysis. Course complicated by AMS likely from brainstem infarct 2/2 LV thrombus and/or toxic metabolic encephalopathy and found to have candidemia and sepsis 2/2 klebsiella bacteremia s/p fluconazole and CTX. Course further complicated by aspiration PNA and completed course with Zosyn- now off abx. Further complication of hypotension with difficulty tolerating HD. Now with chronic respiratory failure. Further goals of care discussion for which patient made DNR, but with continued escalation of care. 63M PMH HFrEF 10-15% (ischemic), MI, s/p AICD vs PPM, possible Afib, HTN, DM2 on insulin, possible CKD, and gout, who presented with a chief complaint of generalized weakness s/p septic shock likely 2/2 LE cellulitis complicated by ATN requiring dialysis. Course complicated by AMS likely from brainstem infarct 2/2 LV thrombus and/or toxic metabolic encephalopathy and found to have candidemia and sepsis 2/2 klebsiella bacteremia s/p fluconazole and CTX. Course further complicated by aspiration PNA and completed course with Zosyn- remains off abx.  Goals of care discussion for which patient made DNR, but with continued escalation of care. 63M PMH HFrEF 10-15% (ischemic), MI, s/p AICD vs PPM, possible Afib, HTN, DM2 on insulin, possible CKD, and gout, who presented with a chief complaint of generalized weakness s/p septic shock likely 2/2 LE cellulitis complicated by ATN requiring dialysis. Course complicated by AMS likely from brainstem infarct 2/2 LV thrombus and/or toxic metabolic encephalopathy and found to have candidemia and sepsis 2/2 klebsiella bacteremia s/p fluconazole and CTX. Course further complicated by aspiration PNA and completed course with Zosyn- remains off abx.  Goals of care discussion for which patient made DNR, but with continued escalation of care. Course also complicated by hypotension for which patient has had difficulty tolerating HD. 63M PMH HFrEF 10-15% (ischemic), MI, s/p AICD vs PPM, possible Afib, HTN, DM2 on insulin, possible CKD, and gout, who presented with a chief complaint of generalized weakness s/p septic shock likely 2/2 LE cellulitis complicated by ATN requiring dialysis. Course complicated by AMS likely from brainstem infarct 2/2 LV thrombus and/or toxic metabolic encephalopathy and found to have candidemia and sepsis 2/2 klebsiella bacteremia s/p fluconazole and CTX. Course further complicated by aspiration PNA and completed course with Zosyn- remains off abx.  Goals of care discussion for which patient made DNR, but with continued escalation of care. Course also complicated by hypotension for which patient has had difficulty tolerating HD, with improving BP today will attempt HD today.

## 2018-05-09 NOTE — CHART NOTE - NSCHARTNOTEFT_GEN_A_CORE
Admitting Diagnosis:   63M PMH HFrEF 10-15% (ischemic), MI, s/p AICD vs PPM, possible Afib, HTN, DM2 on insulin, possible CKD, and gout, who presents with a chief complaint of generalized weakness s/p septic shock likely 2/2 LE cellulitis. AMS and new leukocytisis likely 2/2 UTI. Trach and PEG dependent. Continues to receive HD TIW.    PAST MEDICAL & SURGICAL HISTORY:  Type 2 diabetes mellitus with diabetic peripheral angiopathy without gangrene, with long-term curren  Essential hypertension, benign  Gout  Pacemaker  Chronic systolic heart failure  Myocardial infarction  No significant past surgical history      Current Nutrition Order:  Glucerna 1.5 Terrell @ 50mL/hr x 24hrs plus ProStat Sugar Free BID (200 kcal, 30g protein) providing in total: 1200mL TV, 2000 kcal, 129g protein, 911 mL free H2O, 120% RDI    PO Intake: Good (%) [   ]  Fair (50-75%) [   ] Poor (<25%) [   ]- N/A NPO with TF    GI Issues: Good tolerance per RN     Pain: Unable to assess at this time 2/2 vent     Skin Integrity:  L buttock stage 2 PU  L calf venous ulcer  Trach stage 3 PU  L. UE eschar     Labs:       138  |  94<L>  |  72<H>  ----------------------------<  203<H>  5.2   |  21<L>  |  3.62<H>    Ca    8.7      09 May 2018 06:01  Phos  6.4       Mg     2.1           CAPILLARY BLOOD GLUCOSE      POCT Blood Glucose.: 179 mg/dL (09 May 2018 11:19)  POCT Blood Glucose.: 207 mg/dL (09 May 2018 06:22)  POCT Blood Glucose.: 191 mg/dL (08 May 2018 23:27)  POCT Blood Glucose.: 155 mg/dL (08 May 2018 18:06)  POCT Blood Glucose.: 144 mg/dL (08 May 2018 16:09)      Medications:  MEDICATIONS  (STANDING):  aspirin  chewable 81 milliGRAM(s) Oral daily  atorvastatin 80 milliGRAM(s) Oral at bedtime  chlorhexidine 0.12% Liquid 15 milliLiter(s) Swish and Spit two times a day  chlorhexidine 2% Cloths 1 Application(s) Topical daily  dextrose 5%. 1000 milliLiter(s) (50 mL/Hr) IV Continuous <Continuous>  dextrose 50% Injectable 12.5 Gram(s) IV Push once  dextrose 50% Injectable 25 Gram(s) IV Push once  dextrose 50% Injectable 25 Gram(s) IV Push once  ergocalciferol Drops 6000 Unit(s) Oral daily  escitalopram 5 milliGRAM(s) Oral daily  folic acid 1 milliGRAM(s) Oral daily  insulin glargine Injectable (LANTUS) 22 Unit(s) SubCutaneous at bedtime  insulin regular  human corrective regimen sliding scale   SubCutaneous every 6 hours  insulin regular  human recombinant 8 Unit(s) SubCutaneous every 6 hours  levETIRAcetam  Solution 500 milliGRAM(s) Oral every 24 hours  metoclopramide 5 milliGRAM(s) Oral every 6 hours  midodrine 15 milliGRAM(s) Oral every 8 hours  modafinil 100 milliGRAM(s) Oral <User Schedule>  multivitamin 1 Tablet(s) Oral daily  pantoprazole   Suspension 40 milliGRAM(s) Oral daily  predniSONE   Tablet 15 milliGRAM(s) Oral two times a day  silver sulfADIAZINE 1% Cream 1 Application(s) Topical daily  thiamine 100 milliGRAM(s) Oral daily  warfarin 2.5 milliGRAM(s) Oral once    MEDICATIONS  (PRN):  acetaminophen    Suspension. 650 milliGRAM(s) Oral every 6 hours PRN Moderate Pain (4 - 6)  acetaminophen  Suppository 650 milliGRAM(s) Rectal every 6 hours PRN For Temp greater than 38 C (100.4 F)  dextrose Gel 1 Dose(s) Oral once PRN Blood Glucose LESS THAN 70 milliGRAM(s)/deciliter  glucagon  Injectable 1 milliGRAM(s) IntraMuscular once PRN Glucose LESS THAN 70 milligrams/deciliter      Weight:  Daily     Daily Weight in k.8 (09 May 2018 10:25)    Weight:  86.2kg (5/3)  85.1kg ()  79.1kg ()  75.9kg ()  77.4kg ()  72.7kg ()  77.2kg ()  80.9 kg ()  84.5kg (3/31)  86.9kg (3/19)  94.7 kg (3/16)    Weight Change:   Weight fluctuating throughout admission d/t HD, TF inconsistencies    Estimated energy needs using 89 kg IBW; Needs estimated  vent/post-op/PU  Calories: 25-30 kcal/kg = 3694-0170 kcal/day  Protein: 1.4-1.6 g/kg = 125-142 g protein/day  Fluids: per team / HD     Subjective:   S/p trach and PEG placements on . S/p permacath replacement on . Stable on MICU step down at this time. Pt seen in room, eyes open, non-verbal, follows minimal commands. Trached to vent on CPAP. Currently undergoing HD. TF running at goal rate. Per RN, good tolerance at this time, BG levels better controlled. Palliative continues to follow patient to establish GOC; patient newly made DNR. Crista WNL.     Previous Nutrition Diagnosis:   Increased protein-calorie needs RT increased demand for protein-calorie intake AEB on vent support    Active [X]  Resolved [   ]    New PES statement:     Goal:   Continue to meet % of nutrition needs via tolerated route.     Recommendations:  1. Continue with current TF order; monitor for s/s intolerance, maintain aspiration precautions   2. Continue to trend weights  3 Monitor POC BG  4. Continue to monitor for GOC and keep nutrition in line at all times     Education:   N/A-vent    Risk Level: High [X] Moderate [   ] Low [   ].

## 2018-05-09 NOTE — PROGRESS NOTE ADULT - PROBLEM SELECTOR PLAN 7
S/p tracheostomy 4/5. Previous bedside ultrasound notable for b/l pleural effusions and atelectatic lung-Likely related to fluid overload. If clinically worsening, can consider thoracentesis for fluid studies and cultures. Course further complicated by septic shock from aspiration PNA.   - c/w daily CPAP trials- tolerated 2 hours.   - Completed Zosyn course for aspiration PNA. S/p tracheostomy 4/5. Previous bedside ultrasound notable for b/l pleural effusions and atelectatic lung-Likely related to fluid overload. If clinically worsening, can consider thoracentesis for fluid studies and cultures. Course further complicated by septic shock from aspiration PNA- Completed Zosyn course.  - c/w daily CPAP trials- tolerated 2 hours. S/p tracheostomy 4/5. Previous bedside ultrasound notable for b/l pleural effusions and atelectatic lung-Likely related to fluid overload. If clinically worsening, can consider thoracentesis for fluid studies and cultures. Course further complicated by septic shock from aspiration PNA- Completed Zosyn course.  - c/w daily CPAP trials- S/p tracheostomy 4/5. Previous bedside ultrasound notable for b/l pleural effusions and atelectatic lung-Likely related to fluid overload. If clinically worsening, can consider thoracentesis for fluid studies and cultures. Course further complicated by septic shock from aspiration PNA- Completed Zosyn course.  -CXR notable for b/l effusions R>L, vascular congestion noted, appears overloaded on exam- 2+ edema, noted crackles with coarse breath sounds R>L.   - c/w daily CPAP trials- started this AM with pressure support at 5.

## 2018-05-09 NOTE — PROGRESS NOTE ADULT - PROBLEM SELECTOR PLAN 4
In setting of septic/cardiogenic shock-renal failure likely 2/2 to ischemic ATN with hypoperfusion. Baseline unknown but presenting Cr 3.93 with associated metabolic derangements. Started on HD during course with renal following. Permacath replaced multiple times over course for bacteremia and fungemia. Now with L subclavian HD cath. Last HD yesterday- with only filtration- no volume removed-Last full HD session on Wednesday May 2- as patient with difficulty tolerating even with restarting albumin. Cr at 3.22 today.  -F/u Renal regarding recommendations, No HD today.   -Monitor for electrolyte derangements.     #Adrenal Insufficiency- previously on fludrocortisone and prednisone. Previously started stress dose steroids in setting of acute infection/septic shock. Attempting to wean down.  -As above, switched to prednisone 20mg BID    #Elevated AG  Lactate negative. Glucose with more difficulty controlling- with multiple insulin adjustments and switched back to glucerna. Now on Lantus 22 and Humulin increased to 8 from 4U. Given that patient has been poorly tolerating HD may be more likely 2/2 uremia in setting of ESRD.  - Monitor BMP for electrolyte abnormalities.   - Improve glucose control. In setting of septic/cardiogenic shock-renal failure likely 2/2 to ischemic ATN with hypoperfusion. Baseline unknown but presenting Cr 3.93 with associated metabolic derangements. Started on HD during course with renal following. Permacath replaced multiple times over course for bacteremia and fungemia. Now with L subclavian HD cath. Last HD yesterday- with only filtration- no volume removed-Last full HD session on Wednesday May 2- as patient with difficulty tolerating even with restarting albumin.   -F/u Renal regarding recommendations  -Monitor for electrolyte derangements.     #Adrenal Insufficiency- previously on fludrocortisone and prednisone. Previously started stress dose steroids in setting of acute infection/septic shock. Attempting to wean down.  -c/w prednisone 20mg BID    #Elevated AG  Lactate negative. Glucose with more difficulty controlling- with multiple insulin adjustments and switched back to glucerna.  -Given that patient has been poorly tolerating HD may be more likely 2/2 uremia in setting of ESRD.  - Monitor BMP for electrolyte abnormalities.   - Improve glucose control. In setting of septic/cardiogenic shock-renal failure likely 2/2 to ischemic ATN with hypoperfusion. Baseline unknown but presenting Cr 3.93 with associated metabolic derangements. Started on HD during course with renal following. Permacath replaced multiple times over course for bacteremia and fungemia. Now with L subclavian HD cath. Last HD on Monday 5/7 but no volume removed of note patient has had difficulty tolerating 2/2 to hypotension even with restarting albumin.   -Plan for HD today with some improvement in pressures. F/u Renal recs.  - CXR notable for b/l effusions R>L, vascular congestion noted, appears overloaded on exam.     #Adrenal Insufficiency- previously on fludrocortisone and prednisone. Previously started stress dose steroids in setting of acute infection/septic shock. Attempting to wean down.  -Prednisone tapered to 15 BID for PM dose.    #Elevated AG  In setting of poor tolerance AG 2/2 to uremia. Lactate negative. Glucose improving controlled. Low suspicion for DKA or lactic acidosis.   -Pending HD today, f/u renal recs.  - Monitor BMP for electrolyte abnormalities.

## 2018-05-09 NOTE — PROGRESS NOTE ADULT - PROBLEM SELECTOR PLAN 1
Presented initially with septic shock, has completed course of abx for bacteremia with Klebsiella as well as fungemia. Further complicated by septic shock 2/2 to aspiration with increasing O2 requirements while on 7lACH and transferred back to MICU. Weaned off pressors and back on 7LACH for which patient Completed additional 10 day course of Zosyn.   -Monitor for signs/symptoms of infection. Noted worsening pleural effusion. WBC today at 17.5.     #Fungemia  Noted to have Candida Tropicalis growing in blood cultures and completed fluconazole course. Resolved. Presented initially with septic shock, has completed course of abx for bacteremia with Klebsiella as well as fungemia. Further complicated by septic shock 2/2 to aspiration with increasing O2 requirements while on 7lACH and transferred back to MICU. Weaned off pressors and back on 7LACH for which patient Completed additional 10 day course of Zosyn.       #Fungemia  Noted to have Candida Tropicalis growing in blood cultures and completed fluconazole course. Resolved. Presented initially with septic shock, has completed course of abx for bacteremia with Klebsiella as well as fungemia. Further complicated by septic shock 2/2 to aspiration with increasing O2 requirements while on 7lACH and transferred back to MICU. Weaned off pressors and back on 7LACH for which patient Completed additional 10 day course of Zosyn.   -Resolved at this time. WBC at 14 today. Resolving thrombocytopenia.   -Will attempt to obtain peripheral IV in order to remove LIJ/TLC (4/27 placed- day 13)    #Fungemia  Noted to have Candida Tropicalis growing in blood cultures and completed fluconazole course. Resolved.

## 2018-05-09 NOTE — PROGRESS NOTE ADULT - PROBLEM SELECTOR PLAN 2
Patient with chronic anemia now with hgb 10.1 at present.  EPO during HD treament  Not iron deficient  Transfuse PRBC per primary team.

## 2018-05-09 NOTE — PROGRESS NOTE ADULT - SUBJECTIVE AND OBJECTIVE BOX
PROGRESS NOTE    CC: Septic/cardiogenic shock, ATN on HD, CVA  Overnight Events: No significant overnight events. Patient was noted by night team to be more responsive and able to follow some simple commands (squeeze hand, attempt to wiggle toes).   Interval History:   ROS:    OBJECTIVE  Vitals:  T(C): 36.5 (05-09-18 @ 05:00), Max: 36.6 (05-08-18 @ 17:28)  HR: 96 (05-09-18 @ 05:47) (86 - 102)  BP: 108/71 (05-09-18 @ 04:23) (94/72 - 108/71)  RR: 15 (05-09-18 @ 05:47) (13 - 16)  SpO2: 100% (05-09-18 @ 05:47) (90% - 100%)  Wt(kg): --  Mode: AC/ CMV (Assist Control/ Continuous Mandatory Ventilation)  RR (machine): 10  TV (machine): 500  FiO2: 40  PEEP: 5  PS: 10  ITime: 1  MAP: 8.1  PIP: 20    I/O:  I&O's Summary    07 May 2018 07:01  -  08 May 2018 07:00  --------------------------------------------------------  IN: 2120 mL / OUT: 0 mL / NET: 2120 mL    08 May 2018 07:01  -  09 May 2018 06:06  --------------------------------------------------------  IN: 1700 mL / OUT: 0 mL / NET: 1700 mL        PHYSICAL EXAM:  Appearance: NAD. With trach and peg. Lethargic but more arousable today.  HEENT:  Difficulty assessing eyes- minimal eye opening. Conjunctiva clear b/l. Moist oral mucosa.  Cardiovascular: IRregularly irregular, S1, S2 no murmurs appreciated.   Respiratory: Lungs with b/l coarse breath sounds- improved from last exam. Trach in place.   Gastrointestinal:  Soft, nontender. Mild distension noted. PEG in place. + Bowel sounds.	  Extremities: 2+ edema b/l to mid calf. 2+ dependent edema. No erythema b/l. LE WWP b/l.  Vascular: DP diminished  Neurologic:  Lethargic,  more arousable to verbal or painful stimuli. Spontaneous eye opening. Minimal command following- squeezes R hand. Withdraws all extremities to pain, but LUE more contracted compared to R. Spontaneously moving RUE.   Skin: stasis LE changes  	  LABS:                        9.6    17.5  )-----------( 130      ( 08 May 2018 11:16 )             30.7     05-08    137  |  95<L>  |  60<H>  ----------------------------<  216<H>  4.8   |  24  |  3.22<H>    Ca    8.5      08 May 2018 11:16  Mg     2.0     05-08      PT/INR - ( 08 May 2018 11:16 )   PT: 33.3 sec;   INR: 2.93          PTT - ( 07 May 2018 17:03 )  PTT:43.9 sec      RADIOLOGY & ADDITIONAL TESTS:  Reviewed .    MEDICATIONS  (STANDING):  aspirin  chewable 81 milliGRAM(s) Oral daily  atorvastatin 80 milliGRAM(s) Oral at bedtime  chlorhexidine 0.12% Liquid 15 milliLiter(s) Swish and Spit two times a day  chlorhexidine 2% Cloths 1 Application(s) Topical daily  dextrose 5%. 1000 milliLiter(s) (50 mL/Hr) IV Continuous <Continuous>  dextrose 50% Injectable 12.5 Gram(s) IV Push once  dextrose 50% Injectable 25 Gram(s) IV Push once  dextrose 50% Injectable 25 Gram(s) IV Push once  ergocalciferol Drops 6000 Unit(s) Oral daily  escitalopram 5 milliGRAM(s) Oral daily  folic acid 1 milliGRAM(s) Oral daily  insulin glargine Injectable (LANTUS) 22 Unit(s) SubCutaneous at bedtime  insulin regular  human corrective regimen sliding scale   SubCutaneous every 6 hours  insulin regular  human recombinant 8 Unit(s) SubCutaneous every 6 hours  levETIRAcetam  Solution 500 milliGRAM(s) Oral every 24 hours  metoclopramide 5 milliGRAM(s) Oral every 6 hours  midodrine 15 milliGRAM(s) Oral every 8 hours  modafinil 100 milliGRAM(s) Oral <User Schedule>  multivitamin 1 Tablet(s) Oral daily  pantoprazole   Suspension 40 milliGRAM(s) Oral daily  predniSONE   Tablet 20 milliGRAM(s) Oral two times a day  silver sulfADIAZINE 1% Cream 1 Application(s) Topical daily  thiamine 100 milliGRAM(s) Oral daily    MEDICATIONS  (PRN):  acetaminophen    Suspension. 650 milliGRAM(s) Oral every 6 hours PRN Moderate Pain (4 - 6)  acetaminophen  Suppository 650 milliGRAM(s) Rectal every 6 hours PRN For Temp greater than 38 C (100.4 F)  dextrose Gel 1 Dose(s) Oral once PRN Blood Glucose LESS THAN 70 milliGRAM(s)/deciliter  glucagon  Injectable 1 milliGRAM(s) IntraMuscular once PRN Glucose LESS THAN 70 milligrams/deciliter PROGRESS NOTE    CC: Septic/cardiogenic shock, ATN on HD, CVA  Overnight Events: No significant overnight events. Patient was noted by night team to be more responsive and able to follow some simple commands (squeeze hand, attempt to wiggle toes).   Interval History: More awake, opening eyes spontaneously, making eye contact. Trach with peg- no verbal, responding to questions but more following commands.  ROS: As above.    OBJECTIVE  Vitals:  T(C): 36.5 (05-09-18 @ 05:00), Max: 36.6 (05-08-18 @ 17:28)  HR: 96 (05-09-18 @ 05:47) (86 - 102)  BP: 108/71 (05-09-18 @ 04:23) (94/72 - 108/71)  RR: 15 (05-09-18 @ 05:47) (13 - 16)  SpO2: 100% (05-09-18 @ 05:47) (90% - 100%)  Wt(kg): --  Mode: AC/ CMV (Assist Control/ Continuous Mandatory Ventilation)  RR (machine): 10  TV (machine): 500  FiO2: 40  PEEP: 5  PS: 10  ITime: 1  MAP: 8.1  PIP: 20    I/O:  I&O's Summary    07 May 2018 07:01  -  08 May 2018 07:00  --------------------------------------------------------  IN: 2120 mL / OUT: 0 mL / NET: 2120 mL    08 May 2018 07:01  -  09 May 2018 06:06  --------------------------------------------------------  IN: 1700 mL / OUT: 0 mL / NET: 1700 mL        PHYSICAL EXAM:  Appearance: NAD. With trach and peg. More arousable today.  HEENT:  Increased eye opening. PERRL. Conjunctiva clear b/l. Moist oral mucosa.  Cardiovascular: IRregularly irregular, S1, S2 no murmurs appreciated.   Respiratory: Lungs with b/l coarse breath sounds- improved from last exam. Trach in place.   Gastrointestinal:  Soft, nontender. Mild distension noted. PEG in place. + Bowel sounds.	  Extremities: 2+ edema b/l to mid calf. 2+ dependent edema. No erythema b/l. LE WWP b/l.  Vascular: DP diminished  Neurologic:  Lethargic,  more arousable to verbal or painful stimuli. Spontaneous eye opening. Minimal command following- squeezes R hand. Withdraws all extremities to pain, but LUE more contracted compared to R. Spontaneously moving RUE.   Skin: stasis LE changes  	  LABS:                LABS:                        10.1   14.3  )-----------( 157      ( 09 May 2018 06:01 )             32.5     05-08    137  |  95<L>  |  60<H>  ----------------------------<  216<H>  4.8   |  24  |  3.22<H>    Ca    8.5      08 May 2018 11:16  Mg     2.0     05-08      PT/INR - ( 09 May 2018 06:01 )   PT: 31.8 sec;   INR: 2.81          PTT - ( 07 May 2018 17:03 )  PTT:43.9 sec      RADIOLOGY & ADDITIONAL TESTS:  Reviewed .    MEDICATIONS  (STANDING):  aspirin  chewable 81 milliGRAM(s) Oral daily  atorvastatin 80 milliGRAM(s) Oral at bedtime  chlorhexidine 0.12% Liquid 15 milliLiter(s) Swish and Spit two times a day  chlorhexidine 2% Cloths 1 Application(s) Topical daily  dextrose 5%. 1000 milliLiter(s) (50 mL/Hr) IV Continuous <Continuous>  dextrose 50% Injectable 12.5 Gram(s) IV Push once  dextrose 50% Injectable 25 Gram(s) IV Push once  dextrose 50% Injectable 25 Gram(s) IV Push once  ergocalciferol Drops 6000 Unit(s) Oral daily  escitalopram 5 milliGRAM(s) Oral daily  folic acid 1 milliGRAM(s) Oral daily  insulin glargine Injectable (LANTUS) 22 Unit(s) SubCutaneous at bedtime  insulin regular  human corrective regimen sliding scale   SubCutaneous every 6 hours  insulin regular  human recombinant 8 Unit(s) SubCutaneous every 6 hours  levETIRAcetam  Solution 500 milliGRAM(s) Oral every 24 hours  metoclopramide 5 milliGRAM(s) Oral every 6 hours  midodrine 15 milliGRAM(s) Oral every 8 hours  modafinil 100 milliGRAM(s) Oral <User Schedule>  multivitamin 1 Tablet(s) Oral daily  pantoprazole   Suspension 40 milliGRAM(s) Oral daily  predniSONE   Tablet 20 milliGRAM(s) Oral two times a day  silver sulfADIAZINE 1% Cream 1 Application(s) Topical daily  thiamine 100 milliGRAM(s) Oral daily    MEDICATIONS  (PRN):  acetaminophen    Suspension. 650 milliGRAM(s) Oral every 6 hours PRN Moderate Pain (4 - 6)  acetaminophen  Suppository 650 milliGRAM(s) Rectal every 6 hours PRN For Temp greater than 38 C (100.4 F)  dextrose Gel 1 Dose(s) Oral once PRN Blood Glucose LESS THAN 70 milliGRAM(s)/deciliter  glucagon  Injectable 1 milliGRAM(s) IntraMuscular once PRN Glucose LESS THAN 70 milligrams/deciliter PROGRESS NOTE    CC: Septic/cardiogenic shock, ATN on HD, CVA  Overnight Events: No significant overnight events. Patient was noted by night team to be more responsive and able to follow some simple commands (squeeze hand, attempt to wiggle toes).   Interval History: More awake, opening eyes spontaneously, making eye contact. Trach with peg- no verbal, responding to questions but more following commands.  ROS: As above.    OBJECTIVE  Vitals:  T(C): 36.5 (05-09-18 @ 05:00), Max: 36.6 (05-08-18 @ 17:28)  HR: 96 (05-09-18 @ 05:47) (86 - 102)  BP: 108/71 (05-09-18 @ 04:23) (94/72 - 108/71)  RR: 15 (05-09-18 @ 05:47) (13 - 16)  SpO2: 100% (05-09-18 @ 05:47) (90% - 100%)  Wt(kg): --  Mode: AC/ CMV (Assist Control/ Continuous Mandatory Ventilation)  RR (machine): 10  TV (machine): 500  FiO2: 40  PEEP: 5  PS: 10  ITime: 1  MAP: 8.1  PIP: 20    I/O:  I&O's Summary    07 May 2018 07:01  -  08 May 2018 07:00  --------------------------------------------------------  IN: 2120 mL / OUT: 0 mL / NET: 2120 mL    08 May 2018 07:01  -  09 May 2018 06:06  --------------------------------------------------------  IN: 1700 mL / OUT: 0 mL / NET: 1700 mL        PHYSICAL EXAM:  Appearance: NAD. With trach and peg. More arousable today.  HEENT:  Increased eye opening. PERRL. Conjunctiva clear b/l. Moist oral mucosa.  Cardiovascular: IRregularly irregular, S1, S2 2/6 systolic murmur along lower left sternal border.   Respiratory: Lungs with b/l coarse breath sounds- improved from last exam. Trach in place.   Gastrointestinal:  Soft, nontender. Mild distension noted. PEG in place. + Bowel sounds.	  Extremities: 2+ edema b/l to mid calf. 2+ dependent edema. LE venous stasis changes. No erythema b/l. LE WWP b/l.  Vascular: DP diminished  Neurologic:  Lethargic, more arousable to verbal or painful stimuli. Spontaneous eye opening, making some eye contact. Following more commands- squeezes R hand can wiggle toes, open eyes. Withdraws all extremities to pain, but LUE more contracted compared to R- but spontaneously moving LUE more today (RUE>LUE)  	  LABS:                LABS:                        10.1   14.3  )-----------( 157      ( 09 May 2018 06:01 )             32.5     05-08    137  |  95<L>  |  60<H>  ----------------------------<  216<H>  4.8   |  24  |  3.22<H>    Ca    8.5      08 May 2018 11:16  Mg     2.0     05-08      PT/INR - ( 09 May 2018 06:01 )   PT: 31.8 sec;   INR: 2.81          PTT - ( 07 May 2018 17:03 )  PTT:43.9 sec      RADIOLOGY & ADDITIONAL TESTS:  Reviewed .    MEDICATIONS  (STANDING):  aspirin  chewable 81 milliGRAM(s) Oral daily  atorvastatin 80 milliGRAM(s) Oral at bedtime  chlorhexidine 0.12% Liquid 15 milliLiter(s) Swish and Spit two times a day  chlorhexidine 2% Cloths 1 Application(s) Topical daily  dextrose 5%. 1000 milliLiter(s) (50 mL/Hr) IV Continuous <Continuous>  dextrose 50% Injectable 12.5 Gram(s) IV Push once  dextrose 50% Injectable 25 Gram(s) IV Push once  dextrose 50% Injectable 25 Gram(s) IV Push once  ergocalciferol Drops 6000 Unit(s) Oral daily  escitalopram 5 milliGRAM(s) Oral daily  folic acid 1 milliGRAM(s) Oral daily  insulin glargine Injectable (LANTUS) 22 Unit(s) SubCutaneous at bedtime  insulin regular  human corrective regimen sliding scale   SubCutaneous every 6 hours  insulin regular  human recombinant 8 Unit(s) SubCutaneous every 6 hours  levETIRAcetam  Solution 500 milliGRAM(s) Oral every 24 hours  metoclopramide 5 milliGRAM(s) Oral every 6 hours  midodrine 15 milliGRAM(s) Oral every 8 hours  modafinil 100 milliGRAM(s) Oral <User Schedule>  multivitamin 1 Tablet(s) Oral daily  pantoprazole   Suspension 40 milliGRAM(s) Oral daily  predniSONE   Tablet 20 milliGRAM(s) Oral two times a day  silver sulfADIAZINE 1% Cream 1 Application(s) Topical daily  thiamine 100 milliGRAM(s) Oral daily    MEDICATIONS  (PRN):  acetaminophen    Suspension. 650 milliGRAM(s) Oral every 6 hours PRN Moderate Pain (4 - 6)  acetaminophen  Suppository 650 milliGRAM(s) Rectal every 6 hours PRN For Temp greater than 38 C (100.4 F)  dextrose Gel 1 Dose(s) Oral once PRN Blood Glucose LESS THAN 70 milliGRAM(s)/deciliter  glucagon  Injectable 1 milliGRAM(s) IntraMuscular once PRN Glucose LESS THAN 70 milligrams/deciliter PROGRESS NOTE    CC: Septic/cardiogenic shock, ATN on HD, CVA  Overnight Events: No significant overnight events. Patient was noted by night team to be more responsive and able to follow some simple commands (squeeze hand, attempt to wiggle toes).   Interval History: More awake, opening eyes spontaneously, making eye contact. Trach with peg- no verbal, responding to questions but more following commands.  ROS: As above.    OBJECTIVE  Vitals:  T(C): 36.5 (05-09-18 @ 05:00), Max: 36.6 (05-08-18 @ 17:28)  HR: 96 (05-09-18 @ 05:47) (86 - 102)  BP: 108/71 (05-09-18 @ 04:23) (94/72 - 108/71)  RR: 15 (05-09-18 @ 05:47) (13 - 16)  SpO2: 100% (05-09-18 @ 05:47) (90% - 100%)  Wt(kg): --  Mode: AC/ CMV (Assist Control/ Continuous Mandatory Ventilation)  RR (machine): 10  TV (machine): 500  FiO2: 40  PEEP: 5  PS: 10  ITime: 1  MAP: 8.1  PIP: 20    I/O:  I&O's Summary    07 May 2018 07:01  -  08 May 2018 07:00  --------------------------------------------------------  IN: 2120 mL / OUT: 0 mL / NET: 2120 mL    08 May 2018 07:01  -  09 May 2018 06:06  --------------------------------------------------------  IN: 1700 mL / OUT: 0 mL / NET: 1700 mL        PHYSICAL EXAM:  Appearance: NAD. With trach and peg. More arousable today.  HEENT:  Increased eye opening. PERRL. Conjunctiva clear b/l. Moist oral mucosa.  Cardiovascular: IRregularly irregular, S1, S2 2/6 systolic murmur along lower left sternal border.   Respiratory: R upper lung with coarse breath sounds compared to Left, R decreased at base compared to left. Trach in place.   Gastrointestinal:  Soft, nontender. Mild distension noted. PEG in place. + Bowel sounds.	  Extremities: 2+ edema b/l to mid calf. 2+ dependent edema. LE venous stasis changes. No erythema b/l. LE WWP b/l.  Vascular: DP diminished  Neurologic:  Lethargic, more arousable to verbal or painful stimuli. Spontaneous eye opening, making some eye contact. Following more commands- squeezes R hand can wiggle toes, open eyes. Withdraws all extremities to pain, but LUE more contracted compared to R- but spontaneously moving LUE more today (RUE>LUE).   	  LABS:                LABS:                        10.1   14.3  )-----------( 157      ( 09 May 2018 06:01 )             32.5     05-08    137  |  95<L>  |  60<H>  ----------------------------<  216<H>  4.8   |  24  |  3.22<H>    Ca    8.5      08 May 2018 11:16  Mg     2.0     05-08      PT/INR - ( 09 May 2018 06:01 )   PT: 31.8 sec;   INR: 2.81          PTT - ( 07 May 2018 17:03 )  PTT:43.9 sec      RADIOLOGY & ADDITIONAL TESTS:  Reviewed .    MEDICATIONS  (STANDING):  aspirin  chewable 81 milliGRAM(s) Oral daily  atorvastatin 80 milliGRAM(s) Oral at bedtime  chlorhexidine 0.12% Liquid 15 milliLiter(s) Swish and Spit two times a day  chlorhexidine 2% Cloths 1 Application(s) Topical daily  dextrose 5%. 1000 milliLiter(s) (50 mL/Hr) IV Continuous <Continuous>  dextrose 50% Injectable 12.5 Gram(s) IV Push once  dextrose 50% Injectable 25 Gram(s) IV Push once  dextrose 50% Injectable 25 Gram(s) IV Push once  ergocalciferol Drops 6000 Unit(s) Oral daily  escitalopram 5 milliGRAM(s) Oral daily  folic acid 1 milliGRAM(s) Oral daily  insulin glargine Injectable (LANTUS) 22 Unit(s) SubCutaneous at bedtime  insulin regular  human corrective regimen sliding scale   SubCutaneous every 6 hours  insulin regular  human recombinant 8 Unit(s) SubCutaneous every 6 hours  levETIRAcetam  Solution 500 milliGRAM(s) Oral every 24 hours  metoclopramide 5 milliGRAM(s) Oral every 6 hours  midodrine 15 milliGRAM(s) Oral every 8 hours  modafinil 100 milliGRAM(s) Oral <User Schedule>  multivitamin 1 Tablet(s) Oral daily  pantoprazole   Suspension 40 milliGRAM(s) Oral daily  predniSONE   Tablet 20 milliGRAM(s) Oral two times a day  silver sulfADIAZINE 1% Cream 1 Application(s) Topical daily  thiamine 100 milliGRAM(s) Oral daily    MEDICATIONS  (PRN):  acetaminophen    Suspension. 650 milliGRAM(s) Oral every 6 hours PRN Moderate Pain (4 - 6)  acetaminophen  Suppository 650 milliGRAM(s) Rectal every 6 hours PRN For Temp greater than 38 C (100.4 F)  dextrose Gel 1 Dose(s) Oral once PRN Blood Glucose LESS THAN 70 milliGRAM(s)/deciliter  glucagon  Injectable 1 milliGRAM(s) IntraMuscular once PRN Glucose LESS THAN 70 milligrams/deciliter PROGRESS NOTE    CC: Septic/cardiogenic shock, ATN on HD, CVA  Overnight Events: No significant overnight events. Patient was noted by night team to be more responsive and able to follow some simple commands (squeeze hand, attempt to wiggle toes).   Interval History: More awake, opening eyes spontaneously, making eye contact. Trach with peg. Initially nonresponsive to questions but later this AM during rounds- responding to some questions with head nod- for which patient reports no pain. ROS: As above.    OBJECTIVE  Vitals:  T(C): 36.5 (05-09-18 @ 05:00), Max: 36.6 (05-08-18 @ 17:28)  HR: 96 (05-09-18 @ 05:47) (86 - 102)  BP: 108/71 (05-09-18 @ 04:23) (94/72 - 108/71)  RR: 15 (05-09-18 @ 05:47) (13 - 16)  SpO2: 100% (05-09-18 @ 05:47) (90% - 100%)  Wt(kg): --  Mode: AC/ CMV (Assist Control/ Continuous Mandatory Ventilation)  RR (machine): 10  TV (machine): 500  FiO2: 40  PEEP: 5  PS: 10  ITime: 1  MAP: 8.1  PIP: 20    I/O:  I&O's Summary    07 May 2018 07:01  -  08 May 2018 07:00  --------------------------------------------------------  IN: 2120 mL / OUT: 0 mL / NET: 2120 mL    08 May 2018 07:01  -  09 May 2018 06:06  --------------------------------------------------------  IN: 1700 mL / OUT: 0 mL / NET: 1700 mL        PHYSICAL EXAM:  Appearance: NAD. With trach and peg. More arousable today.  HEENT:  Increased eye opening. PERRL. Conjunctiva clear b/l. Moist oral mucosa.  Cardiovascular: IRregularly irregular, S1, S2 2/6 systolic murmur along lower left sternal border.   Respiratory: R upper lung with coarse breath sounds compared to Left, R decreased at base compared to left, b/l crackles. Trach in place.   Gastrointestinal:  Soft, nontender. Mild distension noted. PEG in place. + Bowel sounds.	  Extremities: 2+ edema b/l to mid calf. 2+ dependent edema. LE venous stasis changes. No erythema b/l. LE WWP b/l.  Vascular: DP diminished  Neurologic:  Lethargic, more arousable to verbal or painful stimuli. Spontaneous eye opening, making some eye contact. Following more commands- squeezes R hand can wiggle toes, open eyes. Withdraws all extremities to pain, but LUE more contracted compared to R- but spontaneously moving LUE more today (RUE>LUE).   	  LABS:                LABS:                        10.1   14.3  )-----------( 157      ( 09 May 2018 06:01 )             32.5     05-08    137  |  95<L>  |  60<H>  ----------------------------<  216<H>  4.8   |  24  |  3.22<H>    Ca    8.5      08 May 2018 11:16  Mg     2.0     05-08      PT/INR - ( 09 May 2018 06:01 )   PT: 31.8 sec;   INR: 2.81          PTT - ( 07 May 2018 17:03 )  PTT:43.9 sec      RADIOLOGY & ADDITIONAL TESTS:  Reviewed .    MEDICATIONS  (STANDING):  aspirin  chewable 81 milliGRAM(s) Oral daily  atorvastatin 80 milliGRAM(s) Oral at bedtime  chlorhexidine 0.12% Liquid 15 milliLiter(s) Swish and Spit two times a day  chlorhexidine 2% Cloths 1 Application(s) Topical daily  dextrose 5%. 1000 milliLiter(s) (50 mL/Hr) IV Continuous <Continuous>  dextrose 50% Injectable 12.5 Gram(s) IV Push once  dextrose 50% Injectable 25 Gram(s) IV Push once  dextrose 50% Injectable 25 Gram(s) IV Push once  ergocalciferol Drops 6000 Unit(s) Oral daily  escitalopram 5 milliGRAM(s) Oral daily  folic acid 1 milliGRAM(s) Oral daily  insulin glargine Injectable (LANTUS) 22 Unit(s) SubCutaneous at bedtime  insulin regular  human corrective regimen sliding scale   SubCutaneous every 6 hours  insulin regular  human recombinant 8 Unit(s) SubCutaneous every 6 hours  levETIRAcetam  Solution 500 milliGRAM(s) Oral every 24 hours  metoclopramide 5 milliGRAM(s) Oral every 6 hours  midodrine 15 milliGRAM(s) Oral every 8 hours  modafinil 100 milliGRAM(s) Oral <User Schedule>  multivitamin 1 Tablet(s) Oral daily  pantoprazole   Suspension 40 milliGRAM(s) Oral daily  predniSONE   Tablet 20 milliGRAM(s) Oral two times a day  silver sulfADIAZINE 1% Cream 1 Application(s) Topical daily  thiamine 100 milliGRAM(s) Oral daily    MEDICATIONS  (PRN):  acetaminophen    Suspension. 650 milliGRAM(s) Oral every 6 hours PRN Moderate Pain (4 - 6)  acetaminophen  Suppository 650 milliGRAM(s) Rectal every 6 hours PRN For Temp greater than 38 C (100.4 F)  dextrose Gel 1 Dose(s) Oral once PRN Blood Glucose LESS THAN 70 milliGRAM(s)/deciliter  glucagon  Injectable 1 milliGRAM(s) IntraMuscular once PRN Glucose LESS THAN 70 milligrams/deciliter PROGRESS NOTE    CC: Septic/cardiogenic shock, ATN on HD, CVA  Overnight Events: No significant overnight events. Patient was noted by night team to be more responsive and able to follow some simple commands (squeeze hand, attempt to wiggle toes).   Interval History: More awake, opening eyes spontaneously, making eye contact. Trach with peg. Initially nonresponsive to questions but later this AM during rounds- responding to some questions with head nod- for which patient reports no pain. ROS: As above.    OBJECTIVE  Vitals:  T(C): 36.5 (05-09-18 @ 05:00), Max: 36.6 (05-08-18 @ 17:28)  HR: 96 (05-09-18 @ 05:47) (86 - 102)  BP: 108/71 (05-09-18 @ 04:23) (94/72 - 108/71)  RR: 15 (05-09-18 @ 05:47) (13 - 16)  SpO2: 100% (05-09-18 @ 05:47) (90% - 100%)  Wt(kg): --  Mode: AC/ CMV (Assist Control/ Continuous Mandatory Ventilation)  RR (machine): 10  TV (machine): 500  FiO2: 40  PEEP: 5  PS: 10  ITime: 1  MAP: 8.1  PIP: 20    I/O:  I&O's Summary    07 May 2018 07:01  -  08 May 2018 07:00  --------------------------------------------------------  IN: 2120 mL / OUT: 0 mL / NET: 2120 mL    08 May 2018 07:01  -  09 May 2018 06:06  --------------------------------------------------------  IN: 1700 mL / OUT: 0 mL / NET: 1700 mL        PHYSICAL EXAM:  Appearance: NAD. With trach and peg. More arousable today.  HEENT:  Increased eye opening. PERRL. Conjunctiva clear b/l. Moist oral mucosa.  Cardiovascular: IRregularly irregular, S1, S2 2/6 systolic murmur along lower left sternal border.   Respiratory: R upper lung with coarse breath sounds compared to Left, R decreased at base compared to left, b/l crackles. Trach in place.   Gastrointestinal:  Soft, nontender. Mild distension noted. PEG in place. + Bowel sounds.	  Extremities: 2+ edema b/l to mid calf. 2+ dependent edema. LE venous stasis changes. No erythema b/l. LE WWP b/l.  Vascular: DP diminished  Neurologic:  Lethargic, more arousable to verbal or painful stimuli. Spontaneous eye opening, making some eye contact. Following more commands- squeezes R hand can wiggle toes, open eyes. Withdraws all extremities to pain, but LUE more contracted compared to R- but spontaneously moving LUE more today (RUE>LUE).   Skin; stasis changes of LEs.  	  LABS:                LABS:                        10.1   14.3  )-----------( 157      ( 09 May 2018 06:01 )             32.5     05-08    137  |  95<L>  |  60<H>  ----------------------------<  216<H>  4.8   |  24  |  3.22<H>    Ca    8.5      08 May 2018 11:16  Mg     2.0     05-08      PT/INR - ( 09 May 2018 06:01 )   PT: 31.8 sec;   INR: 2.81          PTT - ( 07 May 2018 17:03 )  PTT:43.9 sec      RADIOLOGY & ADDITIONAL TESTS:  Reviewed .    MEDICATIONS  (STANDING):  aspirin  chewable 81 milliGRAM(s) Oral daily  atorvastatin 80 milliGRAM(s) Oral at bedtime  chlorhexidine 0.12% Liquid 15 milliLiter(s) Swish and Spit two times a day  chlorhexidine 2% Cloths 1 Application(s) Topical daily  dextrose 5%. 1000 milliLiter(s) (50 mL/Hr) IV Continuous <Continuous>  dextrose 50% Injectable 12.5 Gram(s) IV Push once  dextrose 50% Injectable 25 Gram(s) IV Push once  dextrose 50% Injectable 25 Gram(s) IV Push once  ergocalciferol Drops 6000 Unit(s) Oral daily  escitalopram 5 milliGRAM(s) Oral daily  folic acid 1 milliGRAM(s) Oral daily  insulin glargine Injectable (LANTUS) 22 Unit(s) SubCutaneous at bedtime  insulin regular  human corrective regimen sliding scale   SubCutaneous every 6 hours  insulin regular  human recombinant 8 Unit(s) SubCutaneous every 6 hours  levETIRAcetam  Solution 500 milliGRAM(s) Oral every 24 hours  metoclopramide 5 milliGRAM(s) Oral every 6 hours  midodrine 15 milliGRAM(s) Oral every 8 hours  modafinil 100 milliGRAM(s) Oral <User Schedule>  multivitamin 1 Tablet(s) Oral daily  pantoprazole   Suspension 40 milliGRAM(s) Oral daily  predniSONE   Tablet 20 milliGRAM(s) Oral two times a day  silver sulfADIAZINE 1% Cream 1 Application(s) Topical daily  thiamine 100 milliGRAM(s) Oral daily    MEDICATIONS  (PRN):  acetaminophen    Suspension. 650 milliGRAM(s) Oral every 6 hours PRN Moderate Pain (4 - 6)  acetaminophen  Suppository 650 milliGRAM(s) Rectal every 6 hours PRN For Temp greater than 38 C (100.4 F)  dextrose Gel 1 Dose(s) Oral once PRN Blood Glucose LESS THAN 70 milliGRAM(s)/deciliter  glucagon  Injectable 1 milliGRAM(s) IntraMuscular once PRN Glucose LESS THAN 70 milligrams/deciliter

## 2018-05-09 NOTE — PROGRESS NOTE ADULT - PROBLEM SELECTOR PLAN 4
Patient with severe systolic CHF with worsening volume status and pleural effusion  Will give albumin during HD  Pulmonary follow up for large right plerual effusion.  Optimize CHF treatment per cardiology/CHF team  May need IV inotropic support if remains hypotensive.

## 2018-05-09 NOTE — PROGRESS NOTE ADULT - PROBLEM SELECTOR PLAN 5
Likely 2/2 phlebotomy and CKD, No bloody output. Prior studies showed Anemia of chronic disease. Hgb at 7.6 for which patient given 1U prbc during HD yesterday. Today at hgb 9.6.   - monitor CBC, transfuse if Hgb <7  -Keep active T&S, Monitor H/H Likely 2/2 phlebotomy and CKD, No bloody output. Prior studies showed Anemia of chronic disease. Hgb at 7.6 for which patient given 1U prbc. Hgb at 10.1 today.  - monitor CBC, transfuse if Hgb <7  -Keep active T&S, Monitor H/H

## 2018-05-09 NOTE — PROGRESS NOTE ADULT - PROBLEM SELECTOR PLAN 3
Noted to have change in mental status- encephalopathy likely multifactorial- component of infectious (Septic/cardiogenic shock) as well as complication for brain stem infarct.   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 Per neurology may have had ischemic event from hypoperfusion. Also showing disc protrusion at C3/C4 level coursing superiorly to the C2/C3 contacting the ventral spinal cord.   -Neurology was previously consulted and was following and contributed to noted new weakness/mental status changes.   -Exam with some improvement today, more arousable, following some commands intermittently and can squeeze hand with R hand. Opens eyes spontaneously. Though noted during rounds with some twitching of mouth- may be component of seizure.   - c/w Modafinil  -c/w monitoring neuro exam.   - For complication of seizures- c/w keppra 500 mg qd with extra 250 mg post HD days Noted to have change in mental status- encephalopathy likely multifactorial- component of infectious (Septic/cardiogenic shock) as well as complication for brain stem infarct.   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 Per neurology may have had ischemic event from hypoperfusion. Also showing disc protrusion at C3/C4 level coursing superiorly to the C2/C3 contacting the ventral spinal cord.   -Neurology was previously consulted and was following and contributed to noted new weakness/mental status changes.   -Exam with improving lethargy.  - c/w Modafinil  -c/w monitoring neuro exam.   - For complication of seizures- c/w keppra 500 mg qd with extra 250 mg post HD days Noted to have change in mental status- encephalopathy likely multifactorial- component of infectious (Septic/cardiogenic shock) as well as complication for brain stem infarct.   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 Per neurology may have had ischemic event from hypoperfusion. Also showing disc protrusion at C3/C4 level coursing superiorly to the C2/C3 contacting the ventral spinal cord.   -Neurology was previously consulted and was following and contributed to noted new weakness/mental status changes.   -Exam continues to improve. Noted lethargy improving, more awake, following more commands, making eye contact.  - c/w Modafinil  -c/w monitoring neuro exam.   - For complication of seizures- c/w keppra 500 mg qd with extra 250 mg post HD days Noted to have change in mental status- encephalopathy likely multifactorial- component of infectious (Septic/cardiogenic shock) as well as complication for brain stem infarct.   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 Per neurology may have had ischemic event from hypoperfusion. Also showing disc protrusion at C3/C4 level coursing superiorly to the C2/C3 contacting the ventral spinal cord.   -Neurology was previously consulted and was following and contributed to noted new weakness/mental status changes.   -Exam continues to improve. Noted improving lethargy, more awake, responding to some questions. Moving b/l extremities spontaneously but noted contraction of left hand and Right side neck (Torticollis)   -PT called for contraction of hand and torticollis. F/u recs.  - c/w Modafinil  - For complication of seizures- c/w keppra 500 mg qd with extra 250 mg post HD days

## 2018-05-09 NOTE — PROGRESS NOTE ADULT - PROBLEM SELECTOR PLAN 2
Treated for both septic as well as cardiogenic shock- requiring pressors and inotropes. Subsequently weaned off- On midodrine 15 mg TID. Has intermittently used albumin to tolerate dialysis. Further complicated by continued low pressures- over 24 hours ranging sbp , maintaining MAPs>65. HD yesterday with no volume taken off- just filitration- last full dialysis was last wednesday 5/2.  - c/w Midodrine 15 mg q8h  -Prednisone switched to 20 mg BID for weaning.     #Adrenal Insufficiency  BP have been low with SBP in , with MAP> 60.  -switched to prednisone 20mg BID.   -F/u renal recs regarding HD, underwent some filtration yesterday, without volume taken off. Poorly tolerating HD recently with last full volume taken off on 5/2- last wednesday. Treated for both septic as well as cardiogenic shock- requiring pressors and inotropes. Subsequently weaned off- On midodrine 15 mg TID. Has intermittently used albumin to tolerate dialysis. Further complicated by continued low pressures- over 24 hours ranging sbp , maintaining MAPs>65.   - c/w Midodrine 15 mg q8h  -c/w prednisone 20 mg BID for weaning.     #Adrenal Insufficiency  BP have been low with SBP in , with MAP> 60.  -switched to prednisone 20mg BID.   -F/u renal recs regarding HD, underwent some filtration yesterday, without volume taken off. Poorly tolerating HD recently with last full volume taken off on 5/2- last wednesday. Treated for both septic as well as cardiogenic shock- requiring pressors and inotropes. Subsequently weaned off- On midodrine 15 mg TID. Has intermittently used albumin to tolerate dialysis. Further complicated by continued low pressures- over 24 hours ranging sbp .   - c/w Midodrine 15 mg q8h  -titrated prednisone today from 20 BID to 15BID  - Plan for HD today with improving BP, f/u renal recs.    #Adrenal Insufficiency  BP have been low with SBP in , with MAP> 60.  -switched to prednisone 15mg BID to 20 BID (For PM dose)  -F/u renal recs regarding HD, underwent some filtration on Monday without volume taken off. Plan for HD today with improving BP and potentially taking off volume-f/u renal recs and volume taken off.

## 2018-05-09 NOTE — PROGRESS NOTE ADULT - SUBJECTIVE AND OBJECTIVE BOX
HUNTER BRIZUELA   MRN-[id number]     (1955):     HPI:  64 yo M history of HFrEF 10-15% 2/2 ischemic cardiomyopathy, MI , s/p AICD vs PPM, ?Afib, Hypertension, Diabetes Mellitus Type 2 on insulin, CKD?and gout, who presents with a chief complaint of generalized weakness. Pt wad admitted to [facility] 2 months ago for gout and was sent to rehab. He was discharged home mid Feb with VNS. In the past 2 weeks patient has noted increased leg pain and weakness bilaterally. In the last few days, he has also experienced generalized weakness prompting him to come to ER.   In ER, noted to have t max of 102, SBP 70s, leukocytosis to 32.8, Lactate of 6.6, Acute renal failure with severe metabolic derangements. Given 3.5L fluids and Vanc/Zosyn. MICU consulted. (10 Mar 2018 18:51)    Pt known to palliative medicine earlier in this hospitalization when pt was initially in the MICU.  During that time, pt was critically ill  on life support (mech ventilation, CVVHD, pressors).  [place] Steele met with pt's HCP, (dgt Cristal) to discuss overall goals of care, as pt was not trached or PEG'd yet.   Pt had minimal mental status. Pt was able to open eyes, follow simple commands intermittently.  Pt's dgt agreed to trach and vent as well as PEG to allow pt time to make an improvement as pt has suffered a CVA and his neurological prognosis was unclear. Palliative medicine signed off on .  The goal at that time was to allow pt to stabilize with a plan to transition to LTAC, when medically appropriate.  Pt's dgt was going to reassess pt's overall condition after some time...  Pt has remained in the hospital since .  He has been in (mostly) and out of ICU.  He remains vented thru his trach, he is on HD with high dose midodrine He currently does not require albumin to tolerate his HD sessions.  Pts mental status remains poor.    Palliative medicine will reconsult to discuss with pts dgt plan of care as pt has been hospitalized for months.     Interval History:  see above  Pt more alert today.  Opening eyes spontaneously.  Attempting to scratch his head with his r hand. attempts to show me 2 fingers (lifted 1 finger to request).  No spont movement on LUE.  Pt on HD.   Pt is intermittently tachycardiac but maintaining adequate BPs with 3 50 cc bags of albumin 25%  Pt's goal is 1L of fluid removal with HD today.     ROS:   nonverbal   Unable to attain ROS due to:  poor mental status                    Dyspnea (Heriberto 0-10):                       N/V (Y/N):                            Secretions (Y/N):              Agitation(Y/N):  Pain (Y/N):       -Provocation/Palliation:  -Quality/Quantity:  -Radiating:  -Severity:  -Timing/Frequency:  -Impact on ADLs:    Allergies:  No Known Allergies    Intolerances    Opiate Naive (Y/N):   yes  -iStop reviewed (Y/N):  yes ref # [id number]    MEDICATIONS  (STANDING):  MEDICATIONS  (STANDING):  aspirin  chewable 81 milliGRAM(s) Oral daily  atorvastatin 80 milliGRAM(s) Oral at bedtime  chlorhexidine 0.12% Liquid 15 milliLiter(s) Swish and Spit two times a day  chlorhexidine 2% Cloths 1 Application(s) Topical daily  dextrose 5%. 1000 milliLiter(s) (50 mL/Hr) IV Continuous <Continuous>  dextrose 50% Injectable 12.5 Gram(s) IV Push once  dextrose 50% Injectable 25 Gram(s) IV Push once  dextrose 50% Injectable 25 Gram(s) IV Push once  ergocalciferol Drops 6000 Unit(s) Oral daily  escitalopram 5 milliGRAM(s) Oral daily  folic acid 1 milliGRAM(s) Oral daily  insulin glargine Injectable (LANTUS) 22 Unit(s) SubCutaneous at bedtime  insulin regular  human corrective regimen sliding scale   SubCutaneous every 6 hours  insulin regular  human recombinant 8 Unit(s) SubCutaneous every 6 hours  levETIRAcetam  Solution 500 milliGRAM(s) Oral every 24 hours  metoclopramide 5 milliGRAM(s) Oral every 6 hours  midodrine 15 milliGRAM(s) Oral every 8 hours  modafinil 100 milliGRAM(s) Oral <User Schedule>  multivitamin 1 Tablet(s) Oral daily  pantoprazole   Suspension 40 milliGRAM(s) Oral daily  predniSONE   Tablet 15 milliGRAM(s) Oral two times a day  silver sulfADIAZINE 1% Cream 1 Application(s) Topical daily  thiamine 100 milliGRAM(s) Oral daily  warfarin 2.5 milliGRAM(s) Oral once    MEDICATIONS  (PRN):  acetaminophen    Suspension. 650 milliGRAM(s) Oral every 6 hours PRN Moderate Pain (4 - 6)  acetaminophen  Suppository 650 milliGRAM(s) Rectal every 6 hours PRN For Temp greater than 38 C (100.4 F)  dextrose Gel 1 Dose(s) Oral once PRN Blood Glucose LESS THAN 70 milliGRAM(s)/deciliter  glucagon  Injectable 1 milliGRAM(s) IntraMuscular once PRN Glucose LESS THAN 70 milligrams/deciliter      Labs:                                     cbc                            10.1   14.3  )-----------( 157      ( 09 May 2018 06:01 )             32.5     05-09    138  |  94<L>  |  72<H>  ----------------------------<  203<H>  5.2   |  21<L>  |  3.62<H>    Ca    8.7      09 May 2018 06:01  Phos  6.4     05-09  Mg     2.1     05-09    IMAGING:      none new     PEx:  Vital Signs Last 24 Hrs  T(C): 35.9 (09 May 2018 10:25), Max: 36.6 (08 May 2018 17:28)  T(F): 96.6 (09 May 2018 10:25), Max: 97.8 (08 May 2018 17:28)  HR: 95 (09 May 2018 12:17) (86 - 103)  BP: 100/77 (09 May 2018 10:25) (88/61 - 114/80)  BP(mean): 70 (09 May 2018 08:23) (70 - 89)  RR: 17 (09 May 2018 10:25) (13 - 24)  SpO2: 100% (09 May 2018 12:17) (98% - 100%)      General:  ill appearing, thin, not distressed , awake  HEENT:  nc/at, thin,  temporal wasting, moist oral cavity, eyes open, makes eye contact  Neck:   supple, neg lymphadenopathy,  L IJ TLC, trach in place-- attached to vent  CVS:  AF with brief periods of tachycardia, pacer noted to  L chest wall, + R HD catheter, distant heart tones, generalized edema  Resp:  non-labored, no rhonchi  GI:   nondistended, soft,  +PEG tube in place  :   + scrotal edema, anuric  Musc:   weak, thin, no spontaneous movement  Ext: significant generalized peripheral edema  Neuro:   awake, attempts to follow commands   Psych:  maritza  Skin:  thin, dry, cool, + generalized edema although improving, pitting edema to the hips bilaterally  Lymph:  neg  Preadmit Karnofsky:   50 %           Current Karnofsky:   30    %  Cachexia (Y/N):   yes  BMI:  20.6  (in March, pts BMI was 22)    Advanced Directives:     DNR     HCP noted on chart     DPOA  (for finances)       Decision maker:  Pt does not have any ability to participate in medical decision making.  Legal surrogate:  pts dgt Cristal Castro [phone] is pts HCP    Social History:   single, lives by self, pt has a HHA 4hrs/day, 3 days per week.  Pt has 2 adult children (Cristal aged 37) and pt's son, is 24 and lives in [facility]. 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    He is retired but reports he is not on disability.   Pt is "spiritual" and practices a "Taoism" raquel background   Per notes, pt has not been compliant with his medication (not picked up prescription from his outpt pharmacy) since 2017.    GOALS OF CARE DISCUSSION:       Palliative care info/counseling provided-- following	           Family meeting-  call placed to pts HCP Cristal today to discuss goals and DNR status       Advanced Directives addressed please see Advance Care Planning Note-- 	           Documentation of GOC: DNR in case of cardiac arrest but employ other life prolonging treatments          REFERRALS:	        Palliative Med        Unit SW/Case Mgmt       - referred       Massage Therapy-- referred       Music Therapy-- referred       Nutrition-- following

## 2018-05-10 LAB
ANION GAP SERPL CALC-SCNC: 16 MMOL/L — SIGNIFICANT CHANGE UP (ref 5–17)
BUN SERPL-MCNC: 58 MG/DL — HIGH (ref 7–23)
CALCIUM SERPL-MCNC: 8.6 MG/DL — SIGNIFICANT CHANGE UP (ref 8.4–10.5)
CHLORIDE SERPL-SCNC: 96 MMOL/L — SIGNIFICANT CHANGE UP (ref 96–108)
CO2 SERPL-SCNC: 26 MMOL/L — SIGNIFICANT CHANGE UP (ref 22–31)
CREAT SERPL-MCNC: 2.92 MG/DL — HIGH (ref 0.5–1.3)
GLUCOSE BLDC GLUCOMTR-MCNC: 103 MG/DL — HIGH (ref 70–99)
GLUCOSE BLDC GLUCOMTR-MCNC: 109 MG/DL — HIGH (ref 70–99)
GLUCOSE BLDC GLUCOMTR-MCNC: 117 MG/DL — HIGH (ref 70–99)
GLUCOSE BLDC GLUCOMTR-MCNC: 53 MG/DL — LOW (ref 70–99)
GLUCOSE BLDC GLUCOMTR-MCNC: 57 MG/DL — LOW (ref 70–99)
GLUCOSE BLDC GLUCOMTR-MCNC: 59 MG/DL — LOW (ref 70–99)
GLUCOSE BLDC GLUCOMTR-MCNC: 66 MG/DL — LOW (ref 70–99)
GLUCOSE BLDC GLUCOMTR-MCNC: 71 MG/DL — SIGNIFICANT CHANGE UP (ref 70–99)
GLUCOSE BLDC GLUCOMTR-MCNC: 75 MG/DL — SIGNIFICANT CHANGE UP (ref 70–99)
GLUCOSE BLDC GLUCOMTR-MCNC: 77 MG/DL — SIGNIFICANT CHANGE UP (ref 70–99)
GLUCOSE BLDC GLUCOMTR-MCNC: 79 MG/DL — SIGNIFICANT CHANGE UP (ref 70–99)
GLUCOSE BLDC GLUCOMTR-MCNC: 81 MG/DL — SIGNIFICANT CHANGE UP (ref 70–99)
GLUCOSE SERPL-MCNC: 60 MG/DL — LOW (ref 70–99)
HCT VFR BLD CALC: 33.9 % — LOW (ref 39–50)
HGB BLD-MCNC: 10.4 G/DL — LOW (ref 13–17)
INR BLD: 3.25 — HIGH (ref 0.88–1.16)
MCHC RBC-ENTMCNC: 27.9 PG — SIGNIFICANT CHANGE UP (ref 27–34)
MCHC RBC-ENTMCNC: 30.7 G/DL — LOW (ref 32–36)
MCV RBC AUTO: 90.9 FL — SIGNIFICANT CHANGE UP (ref 80–100)
PLATELET # BLD AUTO: 165 K/UL — SIGNIFICANT CHANGE UP (ref 150–400)
POTASSIUM SERPL-MCNC: 4.5 MMOL/L — SIGNIFICANT CHANGE UP (ref 3.5–5.3)
POTASSIUM SERPL-SCNC: 4.5 MMOL/L — SIGNIFICANT CHANGE UP (ref 3.5–5.3)
PROTHROM AB SERPL-ACNC: 37 SEC — HIGH (ref 9.8–12.7)
RBC # BLD: 3.73 M/UL — LOW (ref 4.2–5.8)
RBC # FLD: 16.9 % — SIGNIFICANT CHANGE UP (ref 10.3–16.9)
SODIUM SERPL-SCNC: 138 MMOL/L — SIGNIFICANT CHANGE UP (ref 135–145)
WBC # BLD: 13.8 K/UL — HIGH (ref 3.8–10.5)
WBC # FLD AUTO: 13.8 K/UL — HIGH (ref 3.8–10.5)

## 2018-05-10 PROCEDURE — 99232 SBSQ HOSP IP/OBS MODERATE 35: CPT | Mod: GC

## 2018-05-10 PROCEDURE — 99233 SBSQ HOSP IP/OBS HIGH 50: CPT | Mod: GC

## 2018-05-10 RX ORDER — SODIUM CHLORIDE 9 MG/ML
1000 INJECTION, SOLUTION INTRAVENOUS
Qty: 0 | Refills: 0 | Status: DISCONTINUED | OUTPATIENT
Start: 2018-05-10 | End: 2018-05-11

## 2018-05-10 RX ORDER — DEXTROSE 50 % IN WATER 50 %
15 SYRINGE (ML) INTRAVENOUS ONCE
Qty: 0 | Refills: 0 | Status: COMPLETED | OUTPATIENT
Start: 2018-05-10 | End: 2018-05-10

## 2018-05-10 RX ORDER — DEXTROSE 50 % IN WATER 50 %
50 SYRINGE (ML) INTRAVENOUS ONCE
Qty: 0 | Refills: 0 | Status: COMPLETED | OUTPATIENT
Start: 2018-05-10 | End: 2018-05-10

## 2018-05-10 RX ORDER — DEXTROSE 50 % IN WATER 50 %
25 SYRINGE (ML) INTRAVENOUS ONCE
Qty: 0 | Refills: 0 | Status: COMPLETED | OUTPATIENT
Start: 2018-05-10 | End: 2018-05-10

## 2018-05-10 RX ADMIN — Medication 100 MILLIGRAM(S): at 12:27

## 2018-05-10 RX ADMIN — Medication 5 MILLIGRAM(S): at 10:52

## 2018-05-10 RX ADMIN — MODAFINIL 100 MILLIGRAM(S): 200 TABLET ORAL at 06:15

## 2018-05-10 RX ADMIN — MODAFINIL 100 MILLIGRAM(S): 200 TABLET ORAL at 14:14

## 2018-05-10 RX ADMIN — INSULIN HUMAN 8 UNIT(S): 100 INJECTION, SOLUTION SUBCUTANEOUS at 00:27

## 2018-05-10 RX ADMIN — Medication 25 MILLILITER(S): at 17:15

## 2018-05-10 RX ADMIN — PANTOPRAZOLE SODIUM 40 MILLIGRAM(S): 20 TABLET, DELAYED RELEASE ORAL at 12:27

## 2018-05-10 RX ADMIN — CHLORHEXIDINE GLUCONATE 15 MILLILITER(S): 213 SOLUTION TOPICAL at 21:48

## 2018-05-10 RX ADMIN — Medication 15 MILLIGRAM(S): at 19:26

## 2018-05-10 RX ADMIN — Medication 5 MILLIGRAM(S): at 19:25

## 2018-05-10 RX ADMIN — Medication 1 APPLICATION(S): at 12:28

## 2018-05-10 RX ADMIN — CHLORHEXIDINE GLUCONATE 1 APPLICATION(S): 213 SOLUTION TOPICAL at 05:57

## 2018-05-10 RX ADMIN — Medication 1 TABLET(S): at 12:27

## 2018-05-10 RX ADMIN — Medication 50 MILLILITER(S): at 14:15

## 2018-05-10 RX ADMIN — LEVETIRACETAM 500 MILLIGRAM(S): 250 TABLET, FILM COATED ORAL at 10:52

## 2018-05-10 RX ADMIN — Medication 25 MILLILITER(S): at 06:25

## 2018-05-10 RX ADMIN — CHLORHEXIDINE GLUCONATE 15 MILLILITER(S): 213 SOLUTION TOPICAL at 11:54

## 2018-05-10 RX ADMIN — Medication 81 MILLIGRAM(S): at 12:27

## 2018-05-10 RX ADMIN — Medication 5 MILLIGRAM(S): at 21:49

## 2018-05-10 RX ADMIN — ATORVASTATIN CALCIUM 80 MILLIGRAM(S): 80 TABLET, FILM COATED ORAL at 21:48

## 2018-05-10 RX ADMIN — Medication 25 MILLILITER(S): at 07:19

## 2018-05-10 RX ADMIN — Medication 650 MILLIGRAM(S): at 07:55

## 2018-05-10 RX ADMIN — Medication 650 MILLIGRAM(S): at 07:21

## 2018-05-10 RX ADMIN — ERGOCALCIFEROL 6000 UNIT(S): 1.25 CAPSULE ORAL at 12:27

## 2018-05-10 RX ADMIN — MIDODRINE HYDROCHLORIDE 15 MILLIGRAM(S): 2.5 TABLET ORAL at 06:14

## 2018-05-10 RX ADMIN — MIDODRINE HYDROCHLORIDE 15 MILLIGRAM(S): 2.5 TABLET ORAL at 23:48

## 2018-05-10 RX ADMIN — Medication 15 GRAM(S): at 14:23

## 2018-05-10 RX ADMIN — Medication 5 MILLIGRAM(S): at 03:01

## 2018-05-10 RX ADMIN — SODIUM CHLORIDE 40 MILLILITER(S): 9 INJECTION, SOLUTION INTRAVENOUS at 16:30

## 2018-05-10 RX ADMIN — MIDODRINE HYDROCHLORIDE 15 MILLIGRAM(S): 2.5 TABLET ORAL at 14:14

## 2018-05-10 RX ADMIN — Medication 15 MILLIGRAM(S): at 06:15

## 2018-05-10 RX ADMIN — ESCITALOPRAM OXALATE 5 MILLIGRAM(S): 10 TABLET, FILM COATED ORAL at 12:27

## 2018-05-10 RX ADMIN — Medication 1 MILLIGRAM(S): at 12:27

## 2018-05-10 NOTE — PROGRESS NOTE ADULT - PROBLEM SELECTOR PLAN 3
Patient's Calcium 8.7 and phosphorus 6.4 at present.  Continue hectoral during HD  Monitor calcium and phosphorus level

## 2018-05-10 NOTE — PROGRESS NOTE ADULT - PROBLEM SELECTOR PLAN 4
In setting of septic/cardiogenic shock-renal failure likely 2/2 to ischemic ATN with hypoperfusion. Baseline unknown but presenting Cr 3.93 with associated metabolic derangements. Started on HD during course with renal following. Permacath replaced multiple times over course for bacteremia and fungemia. Now with L subclavian HD cath. Last HD on Monday 5/7 but no volume removed of note patient has had difficulty tolerating 2/2 to hypotension even with restarting albumin.   -Plan for HD today with some improvement in pressures. F/u Renal recs.  - CXR notable for b/l effusions R>L, vascular congestion noted, appears overloaded on exam.     #Adrenal Insufficiency- previously on fludrocortisone and prednisone. Previously started stress dose steroids in setting of acute infection/septic shock. Attempting to wean down.  -Prednisone tapered to 15 BID for PM dose.    #Elevated AG  In setting of poor tolerance AG 2/2 to uremia. Lactate negative. Glucose improving controlled. Low suspicion for DKA or lactic acidosis.   -Pending HD today, f/u renal recs.  - Monitor BMP for electrolyte abnormalities. In setting of septic/cardiogenic shock-renal failure likely 2/2 to ischemic ATN with hypoperfusion. Baseline unknown but presenting Cr 3.93 with associated metabolic derangements. Started on HD during course with renal following. Permacath replaced multiple times over course for bacteremia and fungemia. Now with L subclavian HD cath. Last HD on Monday 5/7 but no volume removed of note patient has had difficulty tolerating 2/2 to hypotension even with restarting albumin.       #Adrenal Insufficiency- previously on fludrocortisone and prednisone. Previously started stress dose steroids in setting of acute infection/septic shock. Attempting to wean down.  -c/w Prednisone 15 BID     #Elevated AG  In setting of poor tolerance AG 2/2 to uremia. Lactate negative. Glucose improving controlled. Low suspicion for DKA or lactic acidosis.   -Pending HD today, f/u renal recs.  - Monitor BMP for electrolyte abnormalities. In setting of septic/cardiogenic shock-renal failure likely 2/2 to ischemic ATN with hypoperfusion. Baseline unknown but presenting Cr 3.93 with associated metabolic derangements. Started on HD during course with renal following. Permacath replaced multiple times over course for bacteremia and fungemia. Now with L subclavian HD cath. Tolerated HD yesterday with 1.1L off.  -f/u renal recs regarding further HD. Will attempt to wean albumin if BP tolerates.       #Adrenal Insufficiency- previously on fludrocortisone and prednisone. Previously started stress dose steroids in setting of acute infection/septic shock. Attempting to wean down.  -c/w Prednisone 15 BID   -Tolerated HD yesterday with 1.1L off.     #Elevated AG  In setting of poor tolerance AG 2/2 to uremia. Lactate negative. Glucose improving controlled. Low suspicion for DKA or lactic acidosis.   -HD yesterday  - Monitor BMP for electrolyte abnormalities.

## 2018-05-10 NOTE — PROGRESS NOTE ADULT - PROBLEM SELECTOR PLAN 1
Presented initially with septic shock, has completed course of abx for bacteremia with Klebsiella as well as fungemia. Further complicated by septic shock 2/2 to aspiration with increasing O2 requirements while on 7lACH and transferred back to MICU. Weaned off pressors and back on 7LACH for which patient Completed additional 10 day course of Zosyn.   -Resolved at this time. WBC at 14 today. Resolving thrombocytopenia.   -Will attempt to obtain peripheral IV in order to remove LIJ/TLC (4/27 placed- day 13)    #Fungemia  Noted to have Candida Tropicalis growing in blood cultures and completed fluconazole course. Resolved. Presented initially with septic shock, has completed course of abx for bacteremia with Klebsiella as well as fungemia. Further complicated by septic shock 2/2 to aspiration with increasing O2 requirements while on 7lACH and transferred back to MICU. Weaned off pressors and back on 7LACH for which patient Completed additional 10 day course of Zosyn.       #Fungemia  Noted to have Candida Tropicalis growing in blood cultures and completed fluconazole course. Resolved. Presented initially with septic shock, has completed course of abx for bacteremia with Klebsiella as well as fungemia. Further complicated by septic shock 2/2 to aspiration with increasing O2 requirements while on 7lACH and transferred back to MICU. Weaned off pressors and back on 7LACH for which patient Completed additional 10 day course of Zosyn.   -Central line discontinued.   -WBC downtrending- 13.8. Afebrile. Will continue to monitor for signs of infection.    #Fungemia  Noted to have Candida Tropicalis growing in blood cultures and completed fluconazole course. Resolved.

## 2018-05-10 NOTE — PROGRESS NOTE ADULT - ASSESSMENT
The patient with GILMER – due to ATN – not resolving. – oligo/anuric. Requiring HD  Last HD on 5/9 with 1L  UF. Tolerated procedure.

## 2018-05-10 NOTE — PROGRESS NOTE ADULT - PROBLEM SELECTOR PLAN 3
Noted to have change in mental status- encephalopathy likely multifactorial- component of infectious (Septic/cardiogenic shock) as well as complication for brain stem infarct.   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 Per neurology may have had ischemic event from hypoperfusion. Also showing disc protrusion at C3/C4 level coursing superiorly to the C2/C3 contacting the ventral spinal cord.   -Neurology was previously consulted and was following and contributed to noted new weakness/mental status changes.   -Exam continues to improve. Noted improving lethargy, more awake, responding to some questions. Moving b/l extremities spontaneously but noted contraction of left hand and Right side neck (Torticollis)   -PT called for contraction of hand and torticollis. F/u recs.  - c/w Modafinil  - For complication of seizures- c/w keppra 500 mg qd with extra 250 mg post HD days Noted to have change in mental status- encephalopathy likely multifactorial- component of infectious (Septic/cardiogenic shock) as well as complication for brain stem infarct.   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 Per neurology may have had ischemic event from hypoperfusion. Also showing disc protrusion at C3/C4 level coursing superiorly to the C2/C3 contacting the ventral spinal cord.   -Neurology was previously consulted and was following and contributed to noted new weakness/mental status changes.   -Exam continues to improve. Noted improving lethargy, more awake, responding to questions, following commands. Moving all extremities spontaneously though contracted L arm.   -OT consulted and planned to work with patient today.  - c/w Modafinil  - For complication of seizures- c/w keppra 500 mg qd with extra 250 mg post HD days

## 2018-05-10 NOTE — PROGRESS NOTE ADULT - PROBLEM SELECTOR PLAN 10
VTE: Coumadin, dose for 2.5mg (INR 2.81)  GI PPx: PPI    DNR- Made DNR, family wants escalation of care, pressors if needed.    F: No IVF in setting of HD.  E: Replete electrolytes with caution in setting of HD.   N: Glucerna 1.5 at 50cc/hr    Dispo: Initially admitted to MICU but now on 7LACH for further management of hypotension in setting of shock, acute encephalopathy, bacteremia and acute on chronic respiratory failure. Course complicated by Septic shock 2/2 to aspiration PNA for which patient now off levophed. Now on 7LACH for further management of aspiraton PNA, chronic respiratory failure, hypotension and poor glucose control. DNR with palliative care. Pending further medical optimization for HD- plan for HD given improving blood pressure. VTE: Coumadin  GI PPx: PPI    DNR- Made DNR, family wants escalation of care, pressors if needed.    F: No IVF in setting of HD.  E: Replete electrolytes with caution in setting of HD.   N: Glucerna 1.5 at 50cc/hr    Dispo: Initially admitted to MICU but now on 7LACH for further management of hypotension in setting of shock, acute encephalopathy, bacteremia and acute on chronic respiratory failure. Course complicated by Septic shock 2/2 to aspiration PNA for which patient now off levophed. Now on 7LACH for further management of aspiraton PNA, chronic respiratory failure, hypotension and poor glucose control. DNR with palliative care. VTE: Coumadin, INR 3.23, holding PM dose tonight.   GI PPx: PPI    DNR- Made DNR, family wants escalation of care, pressors if needed.    F: No IVF in setting of HD.  E: Replete electrolytes with caution in setting of HD.   N: Glucerna 1.5 at 50cc/hr but given worsening episodes of hypoglycemia- will switch back to Jevity.    Dispo: Initially admitted to MICU but now on 7LACH for further management of hypotension in setting of shock, acute encephalopathy, bacteremia and acute on chronic respiratory failure. Course complicated by Septic shock 2/2 to aspiration PNA for which patient now off levophed. Now on 7LACH for further management of aspiraton PNA, chronic respiratory failure, hypotension and poor glucose control. DNR with palliative care.

## 2018-05-10 NOTE — PROGRESS NOTE ADULT - SUBJECTIVE AND OBJECTIVE BOX
Patient is a 63y Male seen and evaluated at bedside. Patient lying in bed in no acute distress at presetn on ventilatory support through tracheostomy. Pateint awake but confused. Last HD treatment on 5/9 with 1L UF. No new labs available at present      acetaminophen    Suspension. 650 every 6 hours PRN  acetaminophen  Suppository 650 every 6 hours PRN  aspirin  chewable 81 daily  atorvastatin 80 at bedtime  chlorhexidine 0.12% Liquid 15 two times a day  chlorhexidine 2% Cloths 1 daily  dextrose 5%. 1000 <Continuous>  dextrose 50% Injectable 12.5 once  dextrose 50% Injectable 25 once  dextrose 50% Injectable 25 once  dextrose Gel 1 once PRN  ergocalciferol Drops 6000 daily  escitalopram 5 daily  folic acid 1 daily  glucagon  Injectable 1 once PRN  insulin glargine Injectable (LANTUS) 22 at bedtime  insulin regular  human corrective regimen sliding scale  every 6 hours  levETIRAcetam  Solution 500 every 24 hours  metoclopramide 5 every 6 hours  midodrine 15 every 8 hours  modafinil 100 <User Schedule>  multivitamin 1 daily  pantoprazole   Suspension 40 daily  predniSONE   Tablet 15 two times a day  silver sulfADIAZINE 1% Cream 1 daily  thiamine 100 daily      Allergies    No Known Allergies    Intolerances        T(C): , Max: 36.7 (05-10-18 @ 02:00)  T(F): , Max: 98 (05-10-18 @ 02:00)  HR: 98 (05-10-18 @ 05:55)  BP: 92/68 (05-10-18 @ 05:55)  BP(mean): 76 (05-10-18 @ 05:55)  RR: 17 (05-10-18 @ 05:55)  SpO2: 100% (05-10-18 @ 08:06)  Wt(kg): --    05-09 @ 07:01  -  05-10 @ 07:00  --------------------------------------------------------  IN: 1800 mL / OUT: 1150 mL / NET: 650 mL          Review of Systems:  Limited.    PHYSICAL EXAM:  GENERAL: Ill appearing, lying in bed, non verbal on tracheostomy/ventilatory in no acute distress at present  HEAD:  Atraumatic, Normocephalic,   EYES: Bilateral conjuctival and scleral pallor+nt  Oral cavity: Oral mucosa moist and pale  NECK: Neck supple, mild JVP, Tracheostomy present.  CHEST/LUNG: Bilateral decreased breath sounds, Right>left, Bibasilar rales and crepitations, no wheezing  HEART: Regular rate and rhythm. HOMER II/VI at LPSB, No gallop, no rub   ABDOMEN: Soft, Nontender, non distended, BS+nt, No flank tenderness.   EXTREMITIES: Bilateral thigh and leg edema+nt  Neurology: AAOx1, Awake but confused, Able to move extremities, Unable to completely follow verbal commands  SKIN: No rashes or lesions    ACCESS: Right chest wall tunnel HD catheter present.            LABS:                        10.1   14.3  )-----------( 157      ( 09 May 2018 06:01 )             32.5     05-09    138  |  94<L>  |  72<H>  ----------------------------<  203<H>  5.2   |  21<L>  |  3.62<H>    Ca    8.7      09 May 2018 06:01  Phos  6.4     05-09  Mg     2.1     05-09        PT/INR - ( 09 May 2018 06:01 )   PT: 31.8 sec;   INR: 2.81                    RADIOLOGY & ADDITIONAL STUDIES:  < from: Xray Chest 1 View- PORTABLE-Routine (05.09.18 @ 05:50) >    EXAM:  XR CHEST PORTABLE ROUTINE 1V                          PROCEDURE DATE:  05/09/2018                     INTERPRETATION:  Portable chest    History: Follow-up abnormal exam septic cardiogenic shock respiratory   failure    Right pleural effusion similar to prior exam 5/8/2018. Tracheostomy,   venous catheters and left-sided implanted cardiac device again noted.            "Thank you for the opportunity to participate in the care of this   patient."        HESHAM BERRIOS M.D., ATTENDING RADIOLOGIST  This document has been electronically signed. May  9 2018  1:50PM                  < end of copied text >

## 2018-05-10 NOTE — PROGRESS NOTE ADULT - SUBJECTIVE AND OBJECTIVE BOX
PROGRESS NOTE    CC: Septic/cardiogenic shock, ATN on HD, CVA  Overnight Events: Patient with FSG 72 overnight, given 1/2amp D50 and repeat  --> 103. BP's mild hypotension overnight despite initiation of midodrine but with MAPs sustained >65.   Interval History:   ROS:    OBJECTIVE  Vitals:  T(C): 36.7 (05-10-18 @ 02:00), Max: 36.7 (05-10-18 @ 02:00)  HR: 104 (05-10-18 @ 04:34) (90 - 121)  BP: 101/76 (05-10-18 @ 04:34) (88/61 - 114/80)  RR: 15 (05-10-18 @ 04:34) (15 - 24)  SpO2: 100% (05-10-18 @ 04:34) (99% - 100%)  Mode: AC/ CMV (Assist Control/ Continuous Mandatory Ventilation)  RR (machine): 10  TV (machine): 500  FiO2: 40  PEEP: 5  ITime: 1  MAP: 9.4  PIP: 20    I/O:  I&O's Summary    08 May 2018 07:01  -  09 May 2018 07:00  --------------------------------------------------------  IN: 1800 mL / OUT: 0 mL / NET: 1800 mL    09 May 2018 07:01  -  10 May 2018 05:28  --------------------------------------------------------  IN: 1650 mL / OUT: 1150 mL / NET: 500 mL        PHYSICAL EXAM:  Appearance: NAD. With trach and peg. More arousable today.  HEENT:  Increased eye opening. PERRL. Conjunctiva clear b/l. Moist oral mucosa.  Cardiovascular: IRregularly irregular, S1, S2 2/6 systolic murmur along lower left sternal border.   Respiratory: R upper lung with coarse breath sounds compared to Left, R decreased at base compared to left, b/l crackles. Trach in place.   Gastrointestinal:  Soft, nontender. Mild distension noted. PEG in place. + Bowel sounds.	  Extremities: 2+ edema b/l to mid calf. 2+ dependent edema. LE venous stasis changes. No erythema b/l. LE WWP b/l.  Vascular: DP diminished  Neurologic:  Lethargic, more arousable to verbal or painful stimuli. Spontaneous eye opening, making some eye contact. Following more commands- squeezes R hand can wiggle toes, open eyes. Withdraws all extremities to pain, but LUE more contracted compared to R- but spontaneously moving LUE more today (RUE>LUE).   Skin; stasis changes of LEs.  	  LABS:                        10.1   14.3  )-----------( 157      ( 09 May 2018 06:01 )             32.5     05-09    138  |  94<L>  |  72<H>  ----------------------------<  203<H>  5.2   |  21<L>  |  3.62<H>    Ca    8.7      09 May 2018 06:01  Phos  6.4     05-09  Mg     2.1     05-09      PT/INR - ( 09 May 2018 06:01 )   PT: 31.8 sec;   INR: 2.81        RADIOLOGY & ADDITIONAL TESTS:  Reviewed .    MEDICATIONS  (STANDING):  aspirin  chewable 81 milliGRAM(s) Oral daily  atorvastatin 80 milliGRAM(s) Oral at bedtime  chlorhexidine 0.12% Liquid 15 milliLiter(s) Swish and Spit two times a day  chlorhexidine 2% Cloths 1 Application(s) Topical daily  dextrose 5%. 1000 milliLiter(s) (50 mL/Hr) IV Continuous <Continuous>  dextrose 50% Injectable 12.5 Gram(s) IV Push once  dextrose 50% Injectable 25 Gram(s) IV Push once  dextrose 50% Injectable 25 Gram(s) IV Push once  ergocalciferol Drops 6000 Unit(s) Oral daily  escitalopram 5 milliGRAM(s) Oral daily  folic acid 1 milliGRAM(s) Oral daily  insulin glargine Injectable (LANTUS) 22 Unit(s) SubCutaneous at bedtime  insulin regular  human corrective regimen sliding scale   SubCutaneous every 6 hours  insulin regular  human recombinant 8 Unit(s) SubCutaneous every 6 hours  levETIRAcetam  Solution 500 milliGRAM(s) Oral every 24 hours  metoclopramide 5 milliGRAM(s) Oral every 6 hours  midodrine 15 milliGRAM(s) Oral every 8 hours  modafinil 100 milliGRAM(s) Oral <User Schedule>  multivitamin 1 Tablet(s) Oral daily  pantoprazole   Suspension 40 milliGRAM(s) Oral daily  predniSONE   Tablet 15 milliGRAM(s) Oral two times a day  silver sulfADIAZINE 1% Cream 1 Application(s) Topical daily  thiamine 100 milliGRAM(s) Oral daily    MEDICATIONS  (PRN):  acetaminophen    Suspension. 650 milliGRAM(s) Oral every 6 hours PRN Moderate Pain (4 - 6)  acetaminophen  Suppository 650 milliGRAM(s) Rectal every 6 hours PRN For Temp greater than 38 C (100.4 F)  dextrose Gel 1 Dose(s) Oral once PRN Blood Glucose LESS THAN 70 milliGRAM(s)/deciliter  glucagon  Injectable 1 milliGRAM(s) IntraMuscular once PRN Glucose LESS THAN 70 milligrams/deciliter PROGRESS NOTE    CC: Septic/cardiogenic shock, ATN on HD, CVA  Overnight Events: Patient with FSG 72 overnight, given 1/2amp D50 and repeat  --> 103. BP's mild hypotension overnight despite initiation of midodrine but with MAPs sustained >65. FS this AM at 62. Given 1/2 amp.   Interval History: More awake, alert- making eye contact, following commands. Responding to questions by nodding.  Reports discomfort at trach site. Denies headaches, chest pain, shortness of breath, abdominal pain.  ROS: As above.    OBJECTIVE  Vitals:  T(C): 36.7 (05-10-18 @ 02:00), Max: 36.7 (05-10-18 @ 02:00)  HR: 104 (05-10-18 @ 04:34) (90 - 121)  BP: 101/76 (05-10-18 @ 04:34) (88/61 - 114/80)  RR: 15 (05-10-18 @ 04:34) (15 - 24)  SpO2: 100% (05-10-18 @ 04:34) (99% - 100%)  Mode: AC/ CMV (Assist Control/ Continuous Mandatory Ventilation)  RR (machine): 10  TV (machine): 500  FiO2: 40  PEEP: 5  ITime: 1  MAP: 9.4  PIP: 20    I/O:  I&O's Summary    08 May 2018 07:01  -  09 May 2018 07:00  --------------------------------------------------------  IN: 1800 mL / OUT: 0 mL / NET: 1800 mL    09 May 2018 07:01  -  10 May 2018 05:28  --------------------------------------------------------  IN: 1650 mL / OUT: 1150 mL / NET: 500 mL        PHYSICAL EXAM:  Appearance: NAD. With trach and peg. More arousable today- responding to questions appropriately.  HEENT:  Increased eye opening. PERRL. Conjunctiva clear b/l. Moist oral mucosa.  Cardiovascular: Irregularly, irregular- tachycardia. S1, S2 2/6 systolic murmur along lower left sternal border.   Respiratory: Rhonchi on right lung field > left lung field. Trach in place.   Gastrointestinal:  Soft, nontender. Mild distension noted. PEG in place, no erythema or draining noted at site.  Extremities: 2+ edema b/l to mid calf. 1-2+ dependent edema. LE venous stasis changes. No erythema b/l.   Vascular: DP diminished  Neurologic:  Lethargic, more arousable to verbal or painful stimuli. Spontaneous eye opening, making some eye contact. Following more commands- squeezes R hand can wiggle toes, open eyes. Withdraws all extremities to pain, but LUE more contracted compared to R- but spontaneously moving LUE more today (RUE>LUE).   Skin; stasis changes of LEs.  	  LABS:                        10.1   14.3  )-----------( 157      ( 09 May 2018 06:01 )             32.5     05-09    138  |  94<L>  |  72<H>  ----------------------------<  203<H>  5.2   |  21<L>  |  3.62<H>    Ca    8.7      09 May 2018 06:01  Phos  6.4     05-09  Mg     2.1     05-09      PT/INR - ( 09 May 2018 06:01 )   PT: 31.8 sec;   INR: 2.81        RADIOLOGY & ADDITIONAL TESTS:  Reviewed .    MEDICATIONS  (STANDING):  aspirin  chewable 81 milliGRAM(s) Oral daily  atorvastatin 80 milliGRAM(s) Oral at bedtime  chlorhexidine 0.12% Liquid 15 milliLiter(s) Swish and Spit two times a day  chlorhexidine 2% Cloths 1 Application(s) Topical daily  dextrose 5%. 1000 milliLiter(s) (50 mL/Hr) IV Continuous <Continuous>  dextrose 50% Injectable 12.5 Gram(s) IV Push once  dextrose 50% Injectable 25 Gram(s) IV Push once  dextrose 50% Injectable 25 Gram(s) IV Push once  ergocalciferol Drops 6000 Unit(s) Oral daily  escitalopram 5 milliGRAM(s) Oral daily  folic acid 1 milliGRAM(s) Oral daily  insulin glargine Injectable (LANTUS) 22 Unit(s) SubCutaneous at bedtime  insulin regular  human corrective regimen sliding scale   SubCutaneous every 6 hours  insulin regular  human recombinant 8 Unit(s) SubCutaneous every 6 hours  levETIRAcetam  Solution 500 milliGRAM(s) Oral every 24 hours  metoclopramide 5 milliGRAM(s) Oral every 6 hours  midodrine 15 milliGRAM(s) Oral every 8 hours  modafinil 100 milliGRAM(s) Oral <User Schedule>  multivitamin 1 Tablet(s) Oral daily  pantoprazole   Suspension 40 milliGRAM(s) Oral daily  predniSONE   Tablet 15 milliGRAM(s) Oral two times a day  silver sulfADIAZINE 1% Cream 1 Application(s) Topical daily  thiamine 100 milliGRAM(s) Oral daily    MEDICATIONS  (PRN):  acetaminophen    Suspension. 650 milliGRAM(s) Oral every 6 hours PRN Moderate Pain (4 - 6)  acetaminophen  Suppository 650 milliGRAM(s) Rectal every 6 hours PRN For Temp greater than 38 C (100.4 F)  dextrose Gel 1 Dose(s) Oral once PRN Blood Glucose LESS THAN 70 milliGRAM(s)/deciliter  glucagon  Injectable 1 milliGRAM(s) IntraMuscular once PRN Glucose LESS THAN 70 milligrams/deciliter PROGRESS NOTE    CC: Septic/cardiogenic shock, ATN on HD, CVA  Overnight Events: Patient with FSG 72 overnight, given 1/2amp D50 and repeat  --> 103. BP's mild hypotension overnight despite initiation of midodrine but with MAPs sustained >65. FS this AM at 62. Given 1/2 amp.   Interval History: More awake, alert- making eye contact, following commands. Responding to questions by nodding.  Reports discomfort at trach site. Denies headaches, chest pain, shortness of breath, abdominal pain.  ROS: As above.    OBJECTIVE  Vitals:  T(C): 36.7 (05-10-18 @ 02:00), Max: 36.7 (05-10-18 @ 02:00)  HR: 104 (05-10-18 @ 04:34) (90 - 121)  BP: 101/76 (05-10-18 @ 04:34) (88/61 - 114/80)  RR: 15 (05-10-18 @ 04:34) (15 - 24)  SpO2: 100% (05-10-18 @ 04:34) (99% - 100%)  Mode: AC/ CMV (Assist Control/ Continuous Mandatory Ventilation)  RR (machine): 10  TV (machine): 500  FiO2: 40  PEEP: 5  ITime: 1  MAP: 9.4  PIP: 20    I/O:  I&O's Summary    09 May 2018 07:01  -  10 May 2018 07:00  --------------------------------------------------------  IN: 1650 mL / OUT: 1150 mL / NET: 500 mL          PHYSICAL EXAM:  Appearance: NAD. With trach and peg. More arousable today- responding to questions appropriately.  HEENT:  Increased eye opening. PERRL. Conjunctiva clear b/l. Moist oral mucosa.  Cardiovascular: Irregularly, irregular- tachycardia. S1, S2 2/6 systolic murmur along lower left sternal border.   Respiratory: Rhonchi on right lung field > left lung field. Trach in place.   Gastrointestinal:  Soft, nontender. Mild distension noted. PEG in place, no erythema or draining noted at site.  Extremities: 2+ edema b/l to mid calf. 1-2+ dependent edema. LE venous stasis changes. No erythema b/l.   Vascular: DP diminished  Neurologic:  Lethargic, more arousable to verbal or painful stimuli. Spontaneous eye opening, making some eye contact. Following more commands- squeezes R hand can wiggle toes, open eyes. Withdraws all extremities to pain, but LUE more contracted compared to R- but spontaneously moving LUE more today (RUE>LUE).   	  LABS:                        10.1   14.3  )-----------( 157      ( 09 May 2018 06:01 )             32.5     05-09    138  |  94<L>  |  72<H>  ----------------------------<  203<H>  5.2   |  21<L>  |  3.62<H>    Ca    8.7      09 May 2018 06:01  Phos  6.4     05-09  Mg     2.1     05-09      PT/INR - ( 09 May 2018 06:01 )   PT: 31.8 sec;   INR: 2.81        RADIOLOGY & ADDITIONAL TESTS:  Reviewed .    MEDICATIONS  (STANDING):  aspirin  chewable 81 milliGRAM(s) Oral daily  atorvastatin 80 milliGRAM(s) Oral at bedtime  chlorhexidine 0.12% Liquid 15 milliLiter(s) Swish and Spit two times a day  chlorhexidine 2% Cloths 1 Application(s) Topical daily  dextrose 5%. 1000 milliLiter(s) (50 mL/Hr) IV Continuous <Continuous>  dextrose 50% Injectable 12.5 Gram(s) IV Push once  dextrose 50% Injectable 25 Gram(s) IV Push once  dextrose 50% Injectable 25 Gram(s) IV Push once  ergocalciferol Drops 6000 Unit(s) Oral daily  escitalopram 5 milliGRAM(s) Oral daily  folic acid 1 milliGRAM(s) Oral daily  insulin glargine Injectable (LANTUS) 22 Unit(s) SubCutaneous at bedtime  insulin regular  human corrective regimen sliding scale   SubCutaneous every 6 hours  insulin regular  human recombinant 8 Unit(s) SubCutaneous every 6 hours  levETIRAcetam  Solution 500 milliGRAM(s) Oral every 24 hours  metoclopramide 5 milliGRAM(s) Oral every 6 hours  midodrine 15 milliGRAM(s) Oral every 8 hours  modafinil 100 milliGRAM(s) Oral <User Schedule>  multivitamin 1 Tablet(s) Oral daily  pantoprazole   Suspension 40 milliGRAM(s) Oral daily  predniSONE   Tablet 15 milliGRAM(s) Oral two times a day  silver sulfADIAZINE 1% Cream 1 Application(s) Topical daily  thiamine 100 milliGRAM(s) Oral daily    MEDICATIONS  (PRN):  acetaminophen    Suspension. 650 milliGRAM(s) Oral every 6 hours PRN Moderate Pain (4 - 6)  acetaminophen  Suppository 650 milliGRAM(s) Rectal every 6 hours PRN For Temp greater than 38 C (100.4 F)  dextrose Gel 1 Dose(s) Oral once PRN Blood Glucose LESS THAN 70 milliGRAM(s)/deciliter  glucagon  Injectable 1 milliGRAM(s) IntraMuscular once PRN Glucose LESS THAN 70 milligrams/deciliter PROGRESS NOTE    CC: Septic/cardiogenic shock, ATN on HD, CVA  Overnight Events: Patient with FSG 72 overnight, given 1/2amp D50 and repeat  --> 103. BP's mild hypotension overnight despite initiation of midodrine but with MAPs sustained >65. FS this AM at 62. Given 1/2 amp.   Interval History: More awake, alert- making eye contact, following commands. Responding to questions by nodding.  Reports discomfort at trach site. Denies headaches, chest pain, shortness of breath, abdominal pain.  ROS: As above.    OBJECTIVE  Vitals:  T(C): 36.7 (05-10-18 @ 02:00), Max: 36.7 (05-10-18 @ 02:00)  HR: 104 (05-10-18 @ 04:34) (90 - 121)  BP: 101/76 (05-10-18 @ 04:34) (88/61 - 114/80)  RR: 15 (05-10-18 @ 04:34) (15 - 24)  SpO2: 100% (05-10-18 @ 04:34) (99% - 100%)  Mode: AC/ CMV (Assist Control/ Continuous Mandatory Ventilation)  RR (machine): 10  TV (machine): 500  FiO2: 40  PEEP: 5  ITime: 1  MAP: 9.4  PIP: 20    I/O:  I&O's Summary    09 May 2018 07:01  -  10 May 2018 07:00  --------------------------------------------------------  IN: 1650 mL / OUT: 1150 mL / NET: 500 mL          PHYSICAL EXAM:  Appearance: NAD. With trach and peg. More arousable today- responding to questions appropriately.  HEENT:  Increased eye opening. PERRL. Conjunctiva clear b/l. Moist oral mucosa.  Cardiovascular: Irregularly, irregular- tachycardia. S1, S2 2/6 systolic murmur along lower left sternal border.   Respiratory: Rhonchi on right lung field > left lung field. Trach in place.   Gastrointestinal:  Soft, nontender. Mild distension noted. PEG in place, no erythema or draining noted at site.  Extremities: 2+ edema b/l to mid calf. 1-2+ dependent edema. LE venous stasis changes. No erythema b/l.   Vascular: DP diminished  Neurologic:  Lethargic, more arousable to verbal or painful stimuli. Spontaneous eye opening, making some eye contact. Following more commands- squeezes R hand can wiggle toes, open eyes. Withdraws all extremities to pain, but LUE more contracted compared to R- but spontaneously moving LUE more today (RUE>LUE).   Skin: no new rash  	  LABS:                        10.1   14.3  )-----------( 157      ( 09 May 2018 06:01 )             32.5     05-09    138  |  94<L>  |  72<H>  ----------------------------<  203<H>  5.2   |  21<L>  |  3.62<H>    Ca    8.7      09 May 2018 06:01  Phos  6.4     05-09  Mg     2.1     05-09      PT/INR - ( 09 May 2018 06:01 )   PT: 31.8 sec;   INR: 2.81        RADIOLOGY & ADDITIONAL TESTS:  Reviewed .    MEDICATIONS  (STANDING):  aspirin  chewable 81 milliGRAM(s) Oral daily  atorvastatin 80 milliGRAM(s) Oral at bedtime  chlorhexidine 0.12% Liquid 15 milliLiter(s) Swish and Spit two times a day  chlorhexidine 2% Cloths 1 Application(s) Topical daily  dextrose 5%. 1000 milliLiter(s) (50 mL/Hr) IV Continuous <Continuous>  dextrose 50% Injectable 12.5 Gram(s) IV Push once  dextrose 50% Injectable 25 Gram(s) IV Push once  dextrose 50% Injectable 25 Gram(s) IV Push once  ergocalciferol Drops 6000 Unit(s) Oral daily  escitalopram 5 milliGRAM(s) Oral daily  folic acid 1 milliGRAM(s) Oral daily  insulin glargine Injectable (LANTUS) 22 Unit(s) SubCutaneous at bedtime  insulin regular  human corrective regimen sliding scale   SubCutaneous every 6 hours  insulin regular  human recombinant 8 Unit(s) SubCutaneous every 6 hours  levETIRAcetam  Solution 500 milliGRAM(s) Oral every 24 hours  metoclopramide 5 milliGRAM(s) Oral every 6 hours  midodrine 15 milliGRAM(s) Oral every 8 hours  modafinil 100 milliGRAM(s) Oral <User Schedule>  multivitamin 1 Tablet(s) Oral daily  pantoprazole   Suspension 40 milliGRAM(s) Oral daily  predniSONE   Tablet 15 milliGRAM(s) Oral two times a day  silver sulfADIAZINE 1% Cream 1 Application(s) Topical daily  thiamine 100 milliGRAM(s) Oral daily    MEDICATIONS  (PRN):  acetaminophen    Suspension. 650 milliGRAM(s) Oral every 6 hours PRN Moderate Pain (4 - 6)  acetaminophen  Suppository 650 milliGRAM(s) Rectal every 6 hours PRN For Temp greater than 38 C (100.4 F)  dextrose Gel 1 Dose(s) Oral once PRN Blood Glucose LESS THAN 70 milliGRAM(s)/deciliter  glucagon  Injectable 1 milliGRAM(s) IntraMuscular once PRN Glucose LESS THAN 70 milligrams/deciliter

## 2018-05-10 NOTE — PROGRESS NOTE ADULT - PROBLEM SELECTOR PLAN 9
Hx of Afib and LV thrombus on coumadin prior to admission. Most recent TTE with [facility] shows no LV thrombus currently.   - HR has remained stable, Lopressor has been held in setting of hypotension and plan for Dig for rate control if RVR- goal <110.  -Now therapeutic on coumadin- INR this AM at 2.81 (goal 2-3). C/w Coumadin 2.5mg       #LV thrombus  LV thrombus noted on RUKHSANA on 4/10 for which patient has been on hep gtt. C/w bridge.  -c/w Coumadin 2.5mg (INR 2.81-therapeutic- goal 2-3). Hx of Afib and LV thrombus on coumadin prior to admission. Most recent TTE with [facility] shows no LV thrombus currently.   - HR has remained stable, Lopressor has been held in setting of hypotension and plan for Dig for rate control if RVR- goal <110.        #LV thrombus  LV thrombus noted on RUKHSANA on 4/10 for which patient has been on hep gtt. C/w bridge. Hx of Afib and LV thrombus on coumadin prior to admission. Most recent TTE with [facility] shows no LV thrombus currently.   - HR has remained stable, Lopressor has been held in setting of hypotension and plan for Dig for rate control if RVR- goal <110.  -INR 3.23 today. Holding Coumadin dose tonight and repeat INR today.        #LV thrombus  LV thrombus noted on RUKHSANA on 4/10 for which patient has been on hep gtt. C/w bridge.  -INR elevated this AM at 3.25, holding Warfarin today.

## 2018-05-10 NOTE — PROGRESS NOTE ADULT - PROBLEM SELECTOR PLAN 5
Likely 2/2 phlebotomy and CKD, No bloody output. Prior studies showed Anemia of chronic disease. Hgb at 7.6 for which patient given 1U prbc. Hgb at 10.1 today.  - monitor CBC, transfuse if Hgb <7  -Keep active T&S, Monitor H/H Likely 2/2 phlebotomy and CKD, No bloody output. Prior studies showed Anemia of chronic disease.   -Keep active T&S, Monitor H/H

## 2018-05-10 NOTE — PROGRESS NOTE ADULT - PROBLEM SELECTOR PLAN 4
Patient with severe systolic CHF with worsening volume status and pleural effusion  Will give albumin during HD  Pulmonary follow up for large right plerual effusion.  Optimize CHF treatment per cardiology/CHF team

## 2018-05-10 NOTE — PROGRESS NOTE ADULT - ASSESSMENT
63M PMH HFrEF 10-15% (ischemic), MI, s/p AICD vs PPM, possible Afib, HTN, DM2 on insulin, possible CKD, and gout, who presented with a chief complaint of generalized weakness s/p septic shock likely 2/2 LE cellulitis complicated by ATN requiring dialysis. Course complicated by AMS likely from brainstem infarct 2/2 LV thrombus and/or toxic metabolic encephalopathy and found to have candidemia and sepsis 2/2 klebsiella bacteremia s/p fluconazole and CTX. Course further complicated by aspiration PNA and completed course with Zosyn- remains off abx.  Goals of care discussion for which patient made DNR, but with continued escalation of care. Course also complicated by hypotension for which patient has had difficulty tolerating HD, with improving BP today will attempt HD today. 63M PMH HFrEF 10-15% (ischemic), MI, s/p AICD vs PPM, possible Afib, HTN, DM2 on insulin, possible CKD, and gout, who presented with a chief complaint of generalized weakness s/p septic shock likely 2/2 LE cellulitis complicated by ATN requiring dialysis. Course complicated by AMS likely from brainstem infarct 2/2 LV thrombus and/or toxic metabolic encephalopathy and found to have candidemia and sepsis 2/2 klebsiella bacteremia s/p fluconazole and CTX. Course further complicated by aspiration PNA and completed course with Zosyn- remains off abx.  Goals of care discussion for which patient made DNR, but with continued escalation of care. 63M PMH HFrEF 10-15% (ischemic), MI, s/p AICD vs PPM, possible Afib, HTN, DM2 on insulin, possible CKD, and gout, who presented with a chief complaint of generalized weakness s/p septic shock likely 2/2 LE cellulitis complicated by ATN requiring dialysis. Course complicated by AMS likely from brainstem infarct 2/2 LV thrombus and/or toxic metabolic encephalopathy and found to have candidemia and sepsis 2/2 klebsiella bacteremia s/p fluconazole and CTX. Course further complicated by aspiration PNA and completed course with Zosyn- remains off abx.  Goals of care discussion for which patient made DNR, but with continued escalation of care. Management with weaning vent and weaning albumin as blood pressures tolerate.

## 2018-05-10 NOTE — PROGRESS NOTE ADULT - PROBLEM SELECTOR PLAN 6
DM2 on Januvia at home. HbA1C 8.6. Has been on Lantus 42U qHS, insulin 7 units q6h. Difficulty controlling glucose levels and switched to glucerna with episode of hypoglycemia. Currently on Lantus 22U and Humilin 8U.  - Will adjust based on requirements over 24 hours. DM2 on Januvia at home. HbA1C 8.6. Has been on Lantus 42U qHS, insulin 7 units q6h. Difficulty controlling glucose levels. Episodes of hypoglycemia today. DM2 on Januvia at home. HbA1C 8.6. Has been on Lantus 42U qHS, insulin 7 units q6h. Difficulty controlling glucose levels. Episodes of hypoglycemia today.  - Given 2 total AMPS today and 1 AMP o/n.

## 2018-05-10 NOTE — PROGRESS NOTE ADULT - PROBLEM SELECTOR PLAN 7
S/p tracheostomy 4/5. Previous bedside ultrasound notable for b/l pleural effusions and atelectatic lung-Likely related to fluid overload. If clinically worsening, can consider thoracentesis for fluid studies and cultures. Course further complicated by septic shock from aspiration PNA- Completed Zosyn course.  -CXR notable for b/l effusions R>L, vascular congestion noted, appears overloaded on exam- 2+ edema, noted crackles with coarse breath sounds R>L.   - c/w daily CPAP trials- started this AM with pressure support at 5. S/p tracheostomy 4/5. Previous bedside ultrasound notable for b/l pleural effusions and atelectatic lung-Likely related to fluid overload. If clinically worsening, can consider thoracentesis for fluid studies and cultures. Course further complicated by septic shock from aspiration PNA- Completed Zosyn course. S/p tracheostomy 4/5. Previous bedside ultrasound notable for b/l pleural effusions and atelectatic lung-Likely related to fluid overload. If clinically worsening, can consider thoracentesis for fluid studies and cultures. Course further complicated by septic shock from aspiration PNA- Completed Zosyn course.  -c/w weaning as tolerated. Was on CPAP for 5 hours. Will attempt trach collar today.

## 2018-05-10 NOTE — OCCUPATIONAL THERAPY INITIAL EVALUATION ADULT - RANGE OF MOTION EXAMINATION, UPPER EXTREMITY
Bilateral shoulder PROM limited to approximately 10 degrees. Right elbow PROM/AROM WFL, left elbow PROM 10-90 degrees. No available wrist PROM bilaterally +ulnar deviation. Right digits P/AROM WFL. Left digit 1 PROM WNL +spasticity.

## 2018-05-10 NOTE — OCCUPATIONAL THERAPY INITIAL EVALUATION ADULT - MANUAL MUSCLE TESTING RESULTS, REHAB EVAL
Right shoulder 2-/5. Left shoulder 1/5. Right elbow 3+/5, left elbow 1/5. Bilateral wrist 0/5. Right  4/5. Left hand 0/5 +spasticity.

## 2018-05-10 NOTE — OCCUPATIONAL THERAPY INITIAL EVALUATION ADULT - ADDITIONAL COMMENTS
Per chart patient lives alone in apartment +elevator access, had HHA 4 hours x3 days, ambulating with cane PTA.

## 2018-05-10 NOTE — OCCUPATIONAL THERAPY INITIAL EVALUATION ADULT - PLANNED THERAPY INTERVENTIONS, OT EVAL
fine motor coordination training/orthotic fitting/training/strengthening/stretching/ADL retraining/balance training/ROM/cognitive, visual perceptual/bed mobility training

## 2018-05-10 NOTE — PROGRESS NOTE ADULT - PROBLEM SELECTOR PLAN 1
Patient with Oligoanuric GILMER due to ischemic ATN on RRT now via Right chest wall tunnel HD catheter.    Last HD on 5/9 with 1L UF. Tolerated procedure  Will defer HD treatment today  Continue albumin during HD and midodrine for now.

## 2018-05-10 NOTE — OCCUPATIONAL THERAPY INITIAL EVALUATION ADULT - PERTINENT HX OF CURRENT PROBLEM, REHAB EVAL
Per chart, 63M PMH HFrEF 10-15% (ischemic), MI, s/p AICD vs PPM, possible Afib, HTN, DM2 on insulin, possible CKD, and gout, who presented with generalized weakness s/p septic shock likely 2/2 LE cellulitis complicated by ATN requiring dialysis. Course complicated by AMS likely from brainstem infarct 2/2 LV thrombus and/or toxic metabolic encephalopathy and found to have candidemia and sepsis 2/2 klebsiella bacteremia. Course further complicated by aspiration PNA.

## 2018-05-10 NOTE — PROGRESS NOTE ADULT - PROBLEM SELECTOR PLAN 2
Treated for both septic as well as cardiogenic shock- requiring pressors and inotropes. Subsequently weaned off- On midodrine 15 mg TID. Has intermittently used albumin to tolerate dialysis. Further complicated by continued low pressures- over 24 hours ranging sbp .   - c/w Midodrine 15 mg q8h  -titrated prednisone today from 20 BID to 15BID  - Plan for HD today with improving BP, f/u renal recs.    #Adrenal Insufficiency  BP have been low with SBP in , with MAP> 60.  -switched to prednisone 15mg BID to 20 BID (For PM dose)  -F/u renal recs regarding HD, underwent some filtration on Monday without volume taken off. Plan for HD today with improving BP and potentially taking off volume-f/u renal recs and volume taken off. Treated for both septic as well as cardiogenic shock- requiring pressors and inotropes. Subsequently weaned off- On midodrine 15 mg TID. Has intermittently used albumin to tolerate dialysis. Further complicated by continued low pressures- over 24 hours ranging sbp .   - c/w Midodrine 15 mg q8h  -c/w prednisone 15BID    #Adrenal Insufficiency  BP have been low with SBP in , with MAP> 60.  -c/w prednisone 15mg BID   -F/u renal recs regarding HD, underwent some filtration on Monday without volume taken off. Treated for both septic as well as cardiogenic shock- requiring pressors and inotropes. Subsequently weaned off- On midodrine 15 mg TID. Has intermittently used albumin to tolerate dialysis. Further complicated by continued low pressures- over 24 hours ranging sbp .   - c/w Midodrine 15 mg q8h  -c/w prednisone 15BID  -Tolerated HD yesterday, if continues to tolerate, will attempt wean from albumin again.    #Adrenal Insufficiency  BP have been low with SBP in , with MAP> 60.  -c/w prednisone 15mg BID   -F/u renal recs regarding HD, Off 1.1L yesterday during HD, though on albumin. Will attempt to wean as tolerated.

## 2018-05-10 NOTE — OCCUPATIONAL THERAPY INITIAL EVALUATION ADULT - NS ASR FOLLOW COMMAND OT EVAL
able to follow single-step instructions/with verbal/visual/tactle cues. Purposeful movements with RUE/50% of the time

## 2018-05-10 NOTE — OCCUPATIONAL THERAPY INITIAL EVALUATION ADULT - GENERAL OBSERVATIONS, REHAB EVAL
Right hand dominant. Chart reviewed, patient cleared for OT eval by medical team, MATT Guevara. Received semi-supine, NAD, +SCDs, +z-flex boots, +tele, +heplock, +trach to vent, +PEG Right hand dominant. Chart reviewed, patient cleared for OT eval by medical team, MATT Guevara. Received semi-supine, NAD, +SCDs, +z-flex boots, +tele, +heplock, +trach to vent, +PEG, PPM, right subclavian permacath.

## 2018-05-11 DIAGNOSIS — J90 PLEURAL EFFUSION, NOT ELSEWHERE CLASSIFIED: ICD-10-CM

## 2018-05-11 LAB
ANION GAP SERPL CALC-SCNC: 20 MMOL/L — HIGH (ref 5–17)
BUN SERPL-MCNC: 64 MG/DL — HIGH (ref 7–23)
CALCIUM SERPL-MCNC: 8.5 MG/DL — SIGNIFICANT CHANGE UP (ref 8.4–10.5)
CHLORIDE SERPL-SCNC: 95 MMOL/L — LOW (ref 96–108)
CO2 SERPL-SCNC: 24 MMOL/L — SIGNIFICANT CHANGE UP (ref 22–31)
CREAT SERPL-MCNC: 3.43 MG/DL — HIGH (ref 0.5–1.3)
GLUCOSE BLDC GLUCOMTR-MCNC: 140 MG/DL — HIGH (ref 70–99)
GLUCOSE BLDC GLUCOMTR-MCNC: 143 MG/DL — HIGH (ref 70–99)
GLUCOSE BLDC GLUCOMTR-MCNC: 187 MG/DL — HIGH (ref 70–99)
GLUCOSE BLDC GLUCOMTR-MCNC: 203 MG/DL — HIGH (ref 70–99)
GLUCOSE BLDC GLUCOMTR-MCNC: 208 MG/DL — HIGH (ref 70–99)
GLUCOSE BLDC GLUCOMTR-MCNC: 219 MG/DL — HIGH (ref 70–99)
GLUCOSE BLDC GLUCOMTR-MCNC: 97 MG/DL — SIGNIFICANT CHANGE UP (ref 70–99)
GLUCOSE SERPL-MCNC: 175 MG/DL — HIGH (ref 70–99)
HCT VFR BLD CALC: 32.5 % — LOW (ref 39–50)
HGB BLD-MCNC: 9.9 G/DL — LOW (ref 13–17)
INR BLD: 3.62 — HIGH (ref 0.88–1.16)
MAGNESIUM SERPL-MCNC: 2.1 MG/DL — SIGNIFICANT CHANGE UP (ref 1.6–2.6)
MCHC RBC-ENTMCNC: 27.7 PG — SIGNIFICANT CHANGE UP (ref 27–34)
MCHC RBC-ENTMCNC: 30.5 G/DL — LOW (ref 32–36)
MCV RBC AUTO: 91 FL — SIGNIFICANT CHANGE UP (ref 80–100)
PLATELET # BLD AUTO: 171 K/UL — SIGNIFICANT CHANGE UP (ref 150–400)
POTASSIUM SERPL-MCNC: 5.3 MMOL/L — SIGNIFICANT CHANGE UP (ref 3.5–5.3)
POTASSIUM SERPL-SCNC: 5.3 MMOL/L — SIGNIFICANT CHANGE UP (ref 3.5–5.3)
PROTHROM AB SERPL-ACNC: 41.2 SEC — HIGH (ref 9.8–12.7)
RBC # BLD: 3.57 M/UL — LOW (ref 4.2–5.8)
RBC # FLD: 17 % — HIGH (ref 10.3–16.9)
SODIUM SERPL-SCNC: 139 MMOL/L — SIGNIFICANT CHANGE UP (ref 135–145)
WBC # BLD: 13.8 K/UL — HIGH (ref 3.8–10.5)
WBC # FLD AUTO: 13.8 K/UL — HIGH (ref 3.8–10.5)

## 2018-05-11 PROCEDURE — 71045 X-RAY EXAM CHEST 1 VIEW: CPT | Mod: 26

## 2018-05-11 PROCEDURE — 90935 HEMODIALYSIS ONE EVALUATION: CPT | Mod: GC

## 2018-05-11 PROCEDURE — 99233 SBSQ HOSP IP/OBS HIGH 50: CPT | Mod: GC

## 2018-05-11 PROCEDURE — 99232 SBSQ HOSP IP/OBS MODERATE 35: CPT | Mod: GC

## 2018-05-11 RX ORDER — DEXTROSE 50 % IN WATER 50 %
25 SYRINGE (ML) INTRAVENOUS ONCE
Qty: 0 | Refills: 0 | Status: COMPLETED | OUTPATIENT
Start: 2018-05-11 | End: 2018-05-11

## 2018-05-11 RX ORDER — METOPROLOL TARTRATE 50 MG
5 TABLET ORAL ONCE
Qty: 0 | Refills: 0 | Status: COMPLETED | OUTPATIENT
Start: 2018-05-11 | End: 2018-05-11

## 2018-05-11 RX ORDER — ALBUMIN HUMAN 25 %
50 VIAL (ML) INTRAVENOUS
Qty: 0 | Refills: 0 | Status: COMPLETED | OUTPATIENT
Start: 2018-05-11 | End: 2018-05-11

## 2018-05-11 RX ORDER — LEVETIRACETAM 250 MG/1
250 TABLET, FILM COATED ORAL ONCE
Qty: 0 | Refills: 0 | Status: COMPLETED | OUTPATIENT
Start: 2018-05-11 | End: 2018-05-11

## 2018-05-11 RX ORDER — ALBUMIN HUMAN 25 %
50 VIAL (ML) INTRAVENOUS ONCE
Qty: 0 | Refills: 0 | Status: DISCONTINUED | OUTPATIENT
Start: 2018-05-11 | End: 2018-05-11

## 2018-05-11 RX ORDER — ERYTHROPOIETIN 10000 [IU]/ML
7000 INJECTION, SOLUTION INTRAVENOUS; SUBCUTANEOUS ONCE
Qty: 0 | Refills: 0 | Status: COMPLETED | OUTPATIENT
Start: 2018-05-11 | End: 2018-05-11

## 2018-05-11 RX ORDER — DOXERCALCIFEROL 2.5 UG/1
2 CAPSULE ORAL ONCE
Qty: 0 | Refills: 0 | Status: COMPLETED | OUTPATIENT
Start: 2018-05-11 | End: 2018-05-11

## 2018-05-11 RX ADMIN — INSULIN HUMAN 4: 100 INJECTION, SOLUTION SUBCUTANEOUS at 17:28

## 2018-05-11 RX ADMIN — Medication 100 MILLIGRAM(S): at 14:14

## 2018-05-11 RX ADMIN — ESCITALOPRAM OXALATE 5 MILLIGRAM(S): 10 TABLET, FILM COATED ORAL at 14:14

## 2018-05-11 RX ADMIN — MODAFINIL 100 MILLIGRAM(S): 200 TABLET ORAL at 06:17

## 2018-05-11 RX ADMIN — Medication 50 MILLILITER(S): at 12:05

## 2018-05-11 RX ADMIN — Medication 50 MILLILITER(S): at 11:03

## 2018-05-11 RX ADMIN — ERYTHROPOIETIN 7000 UNIT(S): 10000 INJECTION, SOLUTION INTRAVENOUS; SUBCUTANEOUS at 10:58

## 2018-05-11 RX ADMIN — Medication 5 MILLIGRAM(S): at 21:14

## 2018-05-11 RX ADMIN — LEVETIRACETAM 500 MILLIGRAM(S): 250 TABLET, FILM COATED ORAL at 14:20

## 2018-05-11 RX ADMIN — MIDODRINE HYDROCHLORIDE 15 MILLIGRAM(S): 2.5 TABLET ORAL at 14:14

## 2018-05-11 RX ADMIN — CHLORHEXIDINE GLUCONATE 15 MILLILITER(S): 213 SOLUTION TOPICAL at 10:56

## 2018-05-11 RX ADMIN — CHLORHEXIDINE GLUCONATE 15 MILLILITER(S): 213 SOLUTION TOPICAL at 21:15

## 2018-05-11 RX ADMIN — CHLORHEXIDINE GLUCONATE 1 APPLICATION(S): 213 SOLUTION TOPICAL at 07:39

## 2018-05-11 RX ADMIN — Medication 15 MILLIGRAM(S): at 06:18

## 2018-05-11 RX ADMIN — Medication 10 MILLIGRAM(S): at 17:28

## 2018-05-11 RX ADMIN — Medication 1 APPLICATION(S): at 14:15

## 2018-05-11 RX ADMIN — Medication 81 MILLIGRAM(S): at 14:14

## 2018-05-11 RX ADMIN — Medication 1 MILLIGRAM(S): at 14:14

## 2018-05-11 RX ADMIN — Medication 5 MILLIGRAM(S): at 02:51

## 2018-05-11 RX ADMIN — ATORVASTATIN CALCIUM 80 MILLIGRAM(S): 80 TABLET, FILM COATED ORAL at 21:14

## 2018-05-11 RX ADMIN — PANTOPRAZOLE SODIUM 40 MILLIGRAM(S): 20 TABLET, DELAYED RELEASE ORAL at 14:14

## 2018-05-11 RX ADMIN — ERGOCALCIFEROL 6000 UNIT(S): 1.25 CAPSULE ORAL at 14:14

## 2018-05-11 RX ADMIN — Medication 5 MILLIGRAM(S): at 08:03

## 2018-05-11 RX ADMIN — MIDODRINE HYDROCHLORIDE 15 MILLIGRAM(S): 2.5 TABLET ORAL at 06:18

## 2018-05-11 RX ADMIN — MIDODRINE HYDROCHLORIDE 15 MILLIGRAM(S): 2.5 TABLET ORAL at 23:36

## 2018-05-11 RX ADMIN — DOXERCALCIFEROL 2 MICROGRAM(S): 2.5 CAPSULE ORAL at 10:57

## 2018-05-11 RX ADMIN — Medication 5 MILLIGRAM(S): at 14:20

## 2018-05-11 RX ADMIN — Medication 1 TABLET(S): at 14:14

## 2018-05-11 RX ADMIN — LEVETIRACETAM 250 MILLIGRAM(S): 250 TABLET, FILM COATED ORAL at 17:28

## 2018-05-11 RX ADMIN — INSULIN HUMAN 4: 100 INJECTION, SOLUTION SUBCUTANEOUS at 11:48

## 2018-05-11 RX ADMIN — Medication 50 MILLILITER(S): at 13:01

## 2018-05-11 NOTE — CONSULT NOTE ADULT - SUBJECTIVE AND OBJECTIVE BOX
HUNTER BRIZUELA    [id number]    Patient is a 63y old  Male who presents with a chief complaint of     HPI:  64 yo M history of HFrEF 10-15% 2/2 ischemic cardiomyopathy, MI , s/p AICD vs PPM, ?Afib, Hypertension, Diabetes Mellitus Type 2 on insulin, CKD?and gout, who presents with a chief complaint of generalized weakness. Pt giana admitted to [facility] 2 months ago for gout and was sent to rehab. He was discharged home mid Feb with VNS. In the past 2 weeks patient has noted increased leg pain and weakness bilaterally. In the last few days, he has also experienced generalized weakness prompting him to come to ER.   In ER, noted to have t max of 102, SBP 70s, leukocytosis to 32.8, Lactate of 6.6, Acute renal failure with severe metabolic derangements. Given 3.5L fluids and Vanc/Zosyn. MICU consulted. (10 Mar 2018 18:51)      PAST MEDICAL & SURGICAL HISTORY:  Type 2 diabetes mellitus with diabetic peripheral angiopathy without gangrene, with long-term curren  Essential hypertension, benign  Gout  Pacemaker  Chronic systolic heart failure  Myocardial infarction  No significant past surgical history        Social History:    FAMILY HISTORY:      Functional Level Prior to Admission:                          9.9    13.8  )-----------( 171      ( 11 May 2018 06:02 )             32.5       05-11    139  |  95<L>  |  64<H>  ----------------------------<  175<H>  5.3   |  24  |  3.43<H>    Ca    8.5      11 May 2018 06:02  Mg     2.1     05-11        Vital Signs Last 24 Hrs  T(C): 37.6 (11 May 2018 09:10), Max: 37.6 (11 May 2018 09:10)  T(F): 99.6 (11 May 2018 09:10), Max: 99.6 (11 May 2018 09:10)  HR: 126 (11 May 2018 08:23) (82 - 126)  BP: 119/80 (11 May 2018 08:03) (87/61 - 147/72)  BP(mean): 76 (11 May 2018 04:28) (70 - 99)  RR: 10 (11 May 2018 08:03) (10 - 22)  SpO2: 100% (11 May 2018 08:23) (74% - 100%)      PHYSICAL EXAM:  This 63 y-o male was admitted on 03/10/18 with generalized weakness due to septic shock / cellulitis vs pneumonia, complicated by change in mental status  /  brain stem  infarct vs toxic metabolic encephalopathy, respiratory insufficiency, s/p tracheostomy on 04/04/18, aspiration penumonia.        Constitutional: lying in bed, awake, stable    Eyes:    ENMT: s/p trachostomy    Neck:    Breasts:    Back:    Respiratory:    Cardiovascular:    Gastrointestinal: PEG placement    Genitourinary:    Rectal:    Extremities:  spastic ext, more in uppers    Vascular:    Neurological: spastic weakness of upper and lowers, more in uppers, 2+/5, unable to assess muscle strength due to his mental status, no response  to verbal commands.    Skin:    Lymph Nodes:    Musculoskeletal:    Psychiatric:            Impression:  1. s/p septic shock  2.  Brain stem infarct /  toxic metabolic encephalopathy  with spastic weakness of upper and lower extremities   3.  s/p tracheostomy due to respiratory insufficiency / aspiration pneumonia.    Recommendations: 1.  Continue PT / OT for therapeutic ex., and bed mobility   2.  Bilateral wrist-hands resting splint  to be obtained

## 2018-05-11 NOTE — PROGRESS NOTE ADULT - PROBLEM SELECTOR PLAN 9
Hx of Afib and LV thrombus on coumadin prior to admission. Most recent TTE with [facility] shows no LV thrombus currently.   - HR has remained stable, Lopressor has been held in setting of hypotension and plan for Dig for rate control if RVR- goal <110.  - INR 3.62 this AM (therapeutic), Holding Coumadin dose tonight and repeat INR today.    #LV thrombus  LV thrombus noted on RUKHSANA on 4/10 for which patient has been on hep gtt. C/w bridge.  - INR 3.62 this AM (therapeutic), Holding Coumadin dose tonight and repeat INR today.

## 2018-05-11 NOTE — PROGRESS NOTE ADULT - PROBLEM SELECTOR PLAN 7
S/p tracheostomy 4/5. Previous bedside ultrasound notable for b/l pleural effusions and atelectatic lung-Likely related to fluid overload. If clinically worsening, can consider thoracentesis for fluid studies and cultures. Course further complicated by septic shock from aspiration PNA- Completed Zosyn course.  -c/w weaning as tolerated. CPAP with trach collar trials

## 2018-05-11 NOTE — PROGRESS NOTE ADULT - ATTENDING COMMENTS
Patient seen and examined with house-staff during bedside rounds.  Resident note read, including vitals, physical findings, laboratory data, and radiological reports.   Revisions included below.  Direct personal management at bed side and extensive interpretation of the data.  Plan was outlined and discussed in details with the housestaff.  Decision making of high complexity  Action taken for acute disease activity to reflect the level of care provided:  - medication reconciliation  - review laboratory data  Ultrasound of the chest at the bedside revealed small pleural effusion and not indicated for thoracentesis or chest tube drainage observe and will follow periodically

## 2018-05-11 NOTE — PROGRESS NOTE ADULT - ASSESSMENT
63M PMH HFrEF 10-15% (ischemic), MI, s/p AICD vs PPM, possible Afib, HTN, DM2 on insulin, possible CKD, and gout, who presented with a chief complaint of generalized weakness (3/10/2018) s/p septic shock likely 2/2 LE cellulitis complicated by ATN requiring dialysis. Course complicated by AMS likely from brainstem infarct 2/2 LV thrombus and/or toxic metabolic encephalopathy and found to have candidemia and sepsis 2/2 klebsiella bacteremia s/p fluconazole and CTX. Course further complicated by aspiration PNA and completed course with Zosyn- remains off abx.  Goals of care discussion for which patient made DNR, but with continued escalation of care. Management with weaning vent and weaning albumin as blood pressures tolerate. 63M PMH HFrEF 10-15% (ischemic), MI, s/p AICD vs PPM, possible Afib, HTN, DM2 on insulin, possible CKD, and gout, who presented with a chief complaint of generalized weakness (3/10/2018) s/p septic shock likely 2/2 LE cellulitis complicated by ATN requiring dialysis. Course complicated by AMS likely from brainstem infarct 2/2 LV thrombus and/or toxic metabolic encephalopathy and found to have candidemia and sepsis 2/2 klebsiella bacteremia s/p fluconazole and CTX. Course further complicated by aspiration PNA and completed course with Zosyn- remains off abx.  Goals of care discussion for which patient made DNR, but with continued escalation of care. Management with weaning vent for now chronic respiratory failure and weaning albumin as blood pressures tolerate. Right effusion.

## 2018-05-11 NOTE — PROGRESS NOTE ADULT - PROBLEM SELECTOR PLAN 3
Noted to have change in mental status- encephalopathy likely multifactorial- component of infectious (Septic/cardiogenic shock) as well as complication for brain stem infarct. Improved this AM, more alert  -MRI head (3/26) significant for several old post circulation infarcts and possible new area of brainstem infarct. Per neurology may have had ischemic event from hypoperfusion. Also showing disc protrusion at C3/C4 level coursing superiorly to the C2/C3 contacting the ventral spinal cord.   -Neurology was previously consulted and was following and contributed to noted new weakness/mental status changes.   -Exam continues to improve. Noted improving lethargy, more awake, responding to questions, following commands. Moving all extremities spontaneously though contracted L arm.   -OT consulted and planned to work with patient today.  - c/w Modafinil  - For complication of seizures- c/w keppra 500 mg qd with extra 250 mg post HD days

## 2018-05-11 NOTE — PROGRESS NOTE ADULT - PROBLEM SELECTOR PLAN 1
<resolved> Presented initially with septic shock, has completed course of abx for bacteremia with Klebsiella as well as fungemia. Further complicated by septic shock 2/2 to aspiration with increasing O2 requirements while on 7lACH and transferred back to MICU. Weaned off pressors and back on 7LACH for which patient Completed additional 10 day course of Zosyn.   - WBC stable at 13.8. Afebrile, will low suspicion for infection at this time  - continue to monitor    #Fungemia  < resolved> Noted to have Candida Tropicalis growing in blood cultures and completed fluconazole course.

## 2018-05-11 NOTE — PROGRESS NOTE ADULT - PROBLEM SELECTOR PLAN 5
Likely 2/2 phlebotomy and CKD, No bloody output. Prior studies showed Anemia of chronic disease.   - Keep active T&S, Monitor H/H

## 2018-05-11 NOTE — PROGRESS NOTE ADULT - SUBJECTIVE AND OBJECTIVE BOX
Patient seen and examined at bedside.  Patient is a 63 year old male with a history of HFrEF 10-15% due to ischemic cardiomyopathy, s/p AICD, ?atrial fibrillation, hypertension, diabetes mellitus type 2, gout, and CKD for whom nephrology was called for GILMER from ATN requiring hemodialysis. Patient appears to be in no acute distress. Patient was last dilayzed 5/9 with 1L UF     acetaminophen    Suspension. 650 milliGRAM(s) every 6 hours PRN  acetaminophen  Suppository 650 milliGRAM(s) every 6 hours PRN  albumin human 25% IVPB 50 milliLiter(s) every 1 hour  aspirin  chewable 81 milliGRAM(s) daily  atorvastatin 80 milliGRAM(s) at bedtime  chlorhexidine 0.12% Liquid 15 milliLiter(s) two times a day  chlorhexidine 2% Cloths 1 Application(s) daily  dextrose 10% + sodium chloride 0.9%. 1000 milliLiter(s) <Continuous>  dextrose 5%. 1000 milliLiter(s) <Continuous>  dextrose 50% Injectable 12.5 Gram(s) once  dextrose 50% Injectable 25 Gram(s) once  dextrose 50% Injectable 25 Gram(s) once  dextrose Gel 1 Dose(s) once PRN  ergocalciferol Drops 6000 Unit(s) daily  escitalopram 5 milliGRAM(s) daily  folic acid 1 milliGRAM(s) daily  glucagon  Injectable 1 milliGRAM(s) once PRN  insulin regular  human corrective regimen sliding scale   every 6 hours  levETIRAcetam  Solution 500 milliGRAM(s) every 24 hours  metoclopramide 5 milliGRAM(s) every 6 hours  midodrine 15 milliGRAM(s) every 8 hours  multivitamin 1 Tablet(s) daily  pantoprazole   Suspension 40 milliGRAM(s) daily  predniSONE   Tablet 10 milliGRAM(s) two times a day  silver sulfADIAZINE 1% Cream 1 Application(s) daily  thiamine 100 milliGRAM(s) daily    Allergies    No Known Allergies    Intolerances    T(C): , Max: 37.6 (05-11-18 @ 09:10)  T(F): , Max: 99.6 (05-11-18 @ 09:10)  HR: 121 (05-11-18 @ 12:11)  BP: 92/62 (05-11-18 @ 10:25)  BP(mean): 68 (05-11-18 @ 10:25)  RR: 10 (05-11-18 @ 10:25)  SpO2: 99% (05-11-18 @ 12:11)    05-10 @ 07:01  -  05-11 @ 07:00  --------------------------------------------------------  IN:    Enteral Tube Flush: 120 mL    Vital HN: 600 mL  Total IN: 720 mL    OUT:  Total OUT: 0 mL    Total NET: 720 mL    05-11 @ 07:01  -  05-11 @ 12:54  --------------------------------------------------------  IN:    Vital HN: 200 mL  Total IN: 200 mL    OUT:  Total OUT: 0 mL    Total NET: 200 mL    LABS:                        9.9    13.8  )-----------( 171      ( 11 May 2018 06:02 )             32.5     05-11    139  |  95<L>  |  64<H>  ----------------------------<  175<H>  5.3   |  24  |  3.43<H>    Ca    8.5      11 May 2018 06:02  Mg     2.1     05-11    PT/INR - ( 11 May 2018 06:02 )   PT: 41.2 sec;   INR: 3.62

## 2018-05-11 NOTE — PROGRESS NOTE ADULT - PROBLEM SELECTOR PLAN 4
Patient with severe systolic CHF with worsening volume status and pleural effusion  Will give albumin during HD  Pulmonary follow up for large right plerual effusion to consider thoracentesis.   Optimize CHF treatment per cardiology/CHF team

## 2018-05-11 NOTE — PROGRESS NOTE ADULT - ATTENDING COMMENTS
seen on HD, agree with above  tolerating rx well so far -- will try for 1.5 L UF  albumin PRN to support BP

## 2018-05-11 NOTE — PROGRESS NOTE ADULT - SUBJECTIVE AND OBJECTIVE BOX
Interval Events:  Patient seen and examined at bedside. Comfortable on trach collar    MEDICATIONS:  Pulmonary:    Antimicrobials:    Anticoagulants:  aspirin  chewable 81 milliGRAM(s) Oral daily    Cardiac:  midodrine 15 milliGRAM(s) Oral every 8 hours      Allergies    No Known Allergies    Intolerances        Vital Signs Last 24 Hrs  T(C): 36.8 (11 May 2018 18:00), Max: 37.6 (11 May 2018 09:10)  T(F): 98.2 (11 May 2018 18:00), Max: 99.6 (11 May 2018 09:10)  HR: 96 (11 May 2018 20:16) (82 - 126)  BP: 86/62 (11 May 2018 20:16) (86/62 - 119/80)  BP(mean): 70 (11 May 2018 20:16) (68 - 78)  RR: 22 (11 May 2018 20:16) (10 - 22)  SpO2: 87% (11 May 2018 20:16) (87% - 100%)    05-10 @ 07:01  -  05-11 @ 07:00  --------------------------------------------------------  IN: 720 mL / OUT: 0 mL / NET: 720 mL    05-11 @ 07:01  -  05-11 @ 21:08  --------------------------------------------------------  IN: 600 mL / OUT: 1500 mL / NET: -900 mL      Mode: standby      PHYSICAL EXAM:  General:  in no acute distress  HEENT: non icteric  Neck: Supple; no masses  Respiratory: b/l coarse crackles, decreased BS on the R  Cardiovascular: Regular rate and rhythm. S1 and S2 Normal;   Gastrointestinal: Soft non-tender non-distended;   Extremities: WWP, no edema, no cyanosis  Neurological: Alert and not oriented  Skin: Warm and dry. No obvious rash    LABS:      CBC Full  -  ( 11 May 2018 06:02 )  WBC Count : 13.8 K/uL  Hemoglobin : 9.9 g/dL  Hematocrit : 32.5 %  Platelet Count - Automated : 171 K/uL  Mean Cell Volume : 91.0 fL  Mean Cell Hemoglobin : 27.7 pg  Mean Cell Hemoglobin Concentration : 30.5 g/dL  Auto Neutrophil # : x  Auto Lymphocyte # : x  Auto Monocyte # : x  Auto Eosinophil # : x  Auto Basophil # : x  Auto Neutrophil % : x  Auto Lymphocyte % : x  Auto Monocyte % : x  Auto Eosinophil % : x  Auto Basophil % : x    05-11    139  |  95<L>  |  64<H>  ----------------------------<  175<H>  5.3   |  24  |  3.43<H>    Ca    8.5      11 May 2018 06:02  Mg     2.1     05-11      PT/INR - ( 11 May 2018 06:02 )   PT: 41.2 sec;   INR: 3.62                            RADIOLOGY imaging reviewed

## 2018-05-11 NOTE — PROGRESS NOTE ADULT - PROBLEM SELECTOR PLAN 8
CHF with EF 10-15% 2/2 ischemic cardiomyopathy s/p AICD. Elevated BNP on admission with R sided effusion and edema. Patient with persistent pleural effusions on CXR and US and b/l dependent edema.   - c/w holding ACE/BB in setting of sepsis/ hypotension on midodrine    #CAD- Hx of MI and ischemic CM s/p AICD, STEMI 7/2017, was started on Aspirin and Brilinta.   - c/w aspirin 81 and Atorvastatin 80mg qhs.

## 2018-05-11 NOTE — PROGRESS NOTE ADULT - PROBLEM SELECTOR PLAN 10
VTE: - Full A/C  GI PPx: PPI    DNR- Made DNR, family wants escalation of care, pressors if needed.  F: No IVF in setting of HD.  E: Replete electrolytes with caution in setting of HD.   N: Glucerna 1.5 at 50cc/hr but given worsening episodes of hypoglycemia- will switch back to Jevity.    Dispo: Initially admitted to MICU but now on 7LACH for further management of hypotension in setting of shock, acute encephalopathy, bacteremia and acute on chronic respiratory failure. Course complicated by Septic shock 2/2 to aspiration PNA for which patient now off levophed. Now on 7LACH for further management of aspiraton PNA, chronic respiratory failure, hypotension and poor glucose control. DNR with palliative care.

## 2018-05-11 NOTE — PROGRESS NOTE ADULT - ASSESSMENT
62 yo M, PMH of CHF, HTN, DM, presented w septic shock 2/2 cellulitis, respiratory failure s/p trach, brainstem infarct, hospital course also complicated by ATN and need for HD, pneumonia. Now with worsening R effusion

## 2018-05-11 NOTE — PROGRESS NOTE ADULT - PROBLEM SELECTOR PLAN 6
DM2 on Januvia at home. HbA1C 8.6. Has been on Lantus 42U qHS, insulin 7 units q6h. Difficulty controlling glucose levels. Episodes of hypoglycemia today.  - Given 2 total AMPS today and 1 AMP o/n.

## 2018-05-11 NOTE — PROGRESS NOTE ADULT - ATTENDING COMMENTS
Reviewed persistnet right effusion with pulmonary service and they will place a chest tube. Reviewed persistent right effusion with pulmonary service and they feel windows will not allow effective drainage. Size of effusion is moderate.

## 2018-05-11 NOTE — PROGRESS NOTE ADULT - PROBLEM SELECTOR PLAN 2
Treated for both septic as well as cardiogenic shock- requiring pressors and inotropes. Subsequently weaned off- On midodrine 15 mg TID. Has intermittently used albumin to tolerate dialysis. Further complicated by continued low pressures- over 24 hours ranging sbp .   - c/w Midodrine 15 mg q8h  - c/w prednisone 15BID  - plan for HD per schedule ; renal recs     #Adrenal Insufficiency  BP have been low with SBP in , with MAP> 60.  - c/w prednisone 15mg BID   - F/u renal recs regarding HD

## 2018-05-11 NOTE — PROGRESS NOTE ADULT - SUBJECTIVE AND OBJECTIVE BOX
Patient was seen and evaluated on dialysis.   Patient is tolerating the procedure well.   HR: 121 (05-11-18 @ 12:11)  BP: 92/62 (05-11-18 @ 10:25)  Continue dialysis:   Revaclear 300, , , 2K bath   Goal UF 1kg over 3.5 hours

## 2018-05-11 NOTE — PROGRESS NOTE ADULT - PROBLEM SELECTOR PLAN 1
Patient is a 63 year old male with GILMER on CKD requiring dialysis.     P - dialysis today   Revaclear 300, , , 2K bath   Goal UF 1kg over 3.5 hours   Albumin qhourly   dialysate temp at 35.5

## 2018-05-11 NOTE — PROGRESS NOTE ADULT - PROBLEM SELECTOR PLAN 4
In setting of septic/cardiogenic shock-renal failure likely 2/2 to ischemic ATN with hypoperfusion. Baseline unknown but presenting Cr 3.93 with associated metabolic derangements. Started on HD during course with renal following. Permacath replaced multiple times over course for bacteremia and fungemia. Now with L subclavian HD cath. Tolerated HD yesterday with 1.1L off.  -f/u renal recs regarding further HD. Will attempt to wean albumin if BP tolerates.     #Elevated AG  In setting of poor tolerance AG 2/2 to uremia. Lactate negative. Glucose improving controlled. Low suspicion for DKA or lactic acidosis.   - HD per neuro recs  - Monitor BMP for electrolyte abnormalities.

## 2018-05-11 NOTE — PROGRESS NOTE ADULT - SUBJECTIVE AND OBJECTIVE BOX
OVERNIGHT EVENTS:  D10 increased to 50cc/Hr for hypoglycemia.  In AM, RN suction BRB in mouth (small amount) likely 2/2 to biting his tongue yesterday -VSS, Hg stable at this time. Will continue to monitor.     SUBJECTIVE / INTERVAL HPI: Patient seen and examined at bedside.     VITAL SIGNS:  Vital Signs Last 24 Hrs  T(C): 36.7 (11 May 2018 05:00), Max: 37 (10 May 2018 13:45)  T(F): 98.1 (11 May 2018 05:00), Max: 98.6 (10 May 2018 13:45)  HR: 111 (11 May 2018 05:15) (82 - 111)  BP: 93/68 (11 May 2018 04:28) (87/61 - 147/72)  BP(mean): 76 (11 May 2018 04:28) (70 - 99)  RR: 16 (11 May 2018 05:15) (10 - 22)  SpO2: 100% (11 May 2018 05:15) (74% - 100%)    PHYSICAL EXAM:    Appearance: NAD. With trach and peg. awake and alert (was post AM change) - responding to questions appropriately.  HEENT: eyes open, PERRL. Conjunctiva clear b/l. Moist oral mucosa.  Cardiovascular: Irregularly, irregular- tachycardia. S1, S2 2/6 systolic murmur along lower left sternal border.   Respiratory: Rhonchi on right lung field > left lung field. Trach in place.   Gastrointestinal:  Soft, NTND. PEG in place, no erythema or draining noted at site.  Extremities: 2+ edema b/l to mid calf. 1-2+ dependent edema. LE venous stasis changes. No erythema b/l.   Vascular: DP diminished  Neurologic: Arousable to verbal or painful stimuli. Spontaneous eye opening, making some eye contact. Following more commands- squeezes R hand can wiggle toes, open eyes. Withdraws all extremities to pain, but LUE more contracted compared to R- but spontaneously moving LUE more today (RUE>LUE).     MEDICATIONS:  MEDICATIONS  (STANDING):  aspirin  chewable 81 milliGRAM(s) Oral daily  atorvastatin 80 milliGRAM(s) Oral at bedtime  chlorhexidine 0.12% Liquid 15 milliLiter(s) Swish and Spit two times a day  chlorhexidine 2% Cloths 1 Application(s) Topical daily  dextrose 10% + sodium chloride 0.9%. 1000 milliLiter(s) (50 mL/Hr) IV Continuous <Continuous>  dextrose 5%. 1000 milliLiter(s) (50 mL/Hr) IV Continuous <Continuous>  dextrose 50% Injectable 12.5 Gram(s) IV Push once  dextrose 50% Injectable 25 Gram(s) IV Push once  dextrose 50% Injectable 25 Gram(s) IV Push once  ergocalciferol Drops 6000 Unit(s) Oral daily  escitalopram 5 milliGRAM(s) Oral daily  folic acid 1 milliGRAM(s) Oral daily  insulin regular  human corrective regimen sliding scale   SubCutaneous every 6 hours  levETIRAcetam  Solution 500 milliGRAM(s) Oral every 24 hours  metoclopramide 5 milliGRAM(s) Oral every 6 hours  midodrine 15 milliGRAM(s) Oral every 8 hours  modafinil 100 milliGRAM(s) Oral <User Schedule>  multivitamin 1 Tablet(s) Oral daily  pantoprazole   Suspension 40 milliGRAM(s) Oral daily  predniSONE   Tablet 15 milliGRAM(s) Oral two times a day  silver sulfADIAZINE 1% Cream 1 Application(s) Topical daily  thiamine 100 milliGRAM(s) Oral daily    MEDICATIONS  (PRN):  acetaminophen    Suspension. 650 milliGRAM(s) Oral every 6 hours PRN Moderate Pain (4 - 6)  acetaminophen  Suppository 650 milliGRAM(s) Rectal every 6 hours PRN For Temp greater than 38 C (100.4 F)  dextrose Gel 1 Dose(s) Oral once PRN Blood Glucose LESS THAN 70 milliGRAM(s)/deciliter  glucagon  Injectable 1 milliGRAM(s) IntraMuscular once PRN Glucose LESS THAN 70 milligrams/deciliter      ALLERGIES:  Allergies    No Known Allergies    Intolerances        LABS:                        9.9    13.8  )-----------( 171      ( 11 May 2018 06:02 )             32.5     05-11    139  |  95<L>  |  64<H>  ----------------------------<  175<H>  5.3   |  24  |  3.43<H>    Ca    8.5      11 May 2018 06:02  Mg     2.1     05-11      PT/INR - ( 11 May 2018 06:02 )   PT: 41.2 sec;   INR: 3.62              CAPILLARY BLOOD GLUCOSE      POCT Blood Glucose.: 203 mg/dL (11 May 2018 07:01)      RADIOLOGY & ADDITIONAL TESTS: Reviewed. OVERNIGHT EVENTS:  D10 increased to 50cc/Hr for hypoglycemia.  In AM, RN suction BRB in mouth (small amount) likely 2/2 to biting his tongue yesterday -VSS, Hg stable at this time. Will continue to monitor.     SUBJECTIVE / INTERVAL HPI: Patient seen and examined at bedside.   Patient awake, alert, did not track ut responding to Y/N questions. Patient denied pain/discomfort or SOB.     VITAL SIGNS:  Vital Signs Last 24 Hrs  T(C): 36.7 (11 May 2018 05:00), Max: 37 (10 May 2018 13:45)  T(F): 98.1 (11 May 2018 05:00), Max: 98.6 (10 May 2018 13:45)  HR: 111 (11 May 2018 05:15) (82 - 111)  BP: 93/68 (11 May 2018 04:28) (87/61 - 147/72)  BP(mean): 76 (11 May 2018 04:28) (70 - 99)  RR: 16 (11 May 2018 05:15) (10 - 22)  SpO2: 100% (11 May 2018 05:15) (74% - 100%)    PHYSICAL EXAM:    Appearance: NAD. With trach and peg. Awake and alert, responding to Y/N questions. Patient noticeably diaphoretic (rectal T 99F)  HEENT: eyes open, PERRL. Conjunctiva clear b/l. Moist oral mucosa.  Cardiovascular: Irregularly, irregular- tachycardia. S1, S2  systolic murmur along lower left sternal border.   Respiratory: Rhonchi, decreased breath sounds, on right lung field > left lung field. Trach in place.   Gastrointestinal:  Soft, NTND. PEG in place, no erythema or draining noted at site. mild distention.   Extremities: 1-2+ dependent edema. LE venous stasis changes. No erythema b/l. SCDs in place  Vascular: DP diminished  Neurologic: wake and alert to verbal, does not track but responds to questions.  moves all ext, LUE more contracted compared to R.    MEDICATIONS:  MEDICATIONS  (STANDING):  aspirin  chewable 81 milliGRAM(s) Oral daily  atorvastatin 80 milliGRAM(s) Oral at bedtime  chlorhexidine 0.12% Liquid 15 milliLiter(s) Swish and Spit two times a day  chlorhexidine 2% Cloths 1 Application(s) Topical daily  dextrose 10% + sodium chloride 0.9%. 1000 milliLiter(s) (50 mL/Hr) IV Continuous <Continuous>  dextrose 5%. 1000 milliLiter(s) (50 mL/Hr) IV Continuous <Continuous>  dextrose 50% Injectable 12.5 Gram(s) IV Push once  dextrose 50% Injectable 25 Gram(s) IV Push once  dextrose 50% Injectable 25 Gram(s) IV Push once  ergocalciferol Drops 6000 Unit(s) Oral daily  escitalopram 5 milliGRAM(s) Oral daily  folic acid 1 milliGRAM(s) Oral daily  insulin regular  human corrective regimen sliding scale   SubCutaneous every 6 hours  levETIRAcetam  Solution 500 milliGRAM(s) Oral every 24 hours  metoclopramide 5 milliGRAM(s) Oral every 6 hours  midodrine 15 milliGRAM(s) Oral every 8 hours  modafinil 100 milliGRAM(s) Oral <User Schedule>  multivitamin 1 Tablet(s) Oral daily  pantoprazole   Suspension 40 milliGRAM(s) Oral daily  predniSONE   Tablet 15 milliGRAM(s) Oral two times a day  silver sulfADIAZINE 1% Cream 1 Application(s) Topical daily  thiamine 100 milliGRAM(s) Oral daily    MEDICATIONS  (PRN):  acetaminophen    Suspension. 650 milliGRAM(s) Oral every 6 hours PRN Moderate Pain (4 - 6)  acetaminophen  Suppository 650 milliGRAM(s) Rectal every 6 hours PRN For Temp greater than 38 C (100.4 F)  dextrose Gel 1 Dose(s) Oral once PRN Blood Glucose LESS THAN 70 milliGRAM(s)/deciliter  glucagon  Injectable 1 milliGRAM(s) IntraMuscular once PRN Glucose LESS THAN 70 milligrams/deciliter      ALLERGIES:  Allergies    No Known Allergies    Intolerances        LABS:                        9.9    13.8  )-----------( 171      ( 11 May 2018 06:02 )             32.5     05-11    139  |  95<L>  |  64<H>  ----------------------------<  175<H>  5.3   |  24  |  3.43<H>    Ca    8.5      11 May 2018 06:02  Mg     2.1     05-11      PT/INR - ( 11 May 2018 06:02 )   PT: 41.2 sec;   INR: 3.62              CAPILLARY BLOOD GLUCOSE      POCT Blood Glucose.: 203 mg/dL (11 May 2018 07:01)      RADIOLOGY & ADDITIONAL TESTS: Reviewed. OVERNIGHT EVENTS:  D10 increased to 50cc/Hr for hypoglycemia.  In AM, RN suction BRB in mouth (small amount) likely 2/2 to biting his tongue yesterday -VSS, Hg stable at this time. Will continue to monitor.     SUBJECTIVE / INTERVAL HPI: Patient seen and examined at bedside.   Patient awake, alert, did not track ut responding to Y/N questions. Patient denied pain/discomfort or SOB.     VITAL SIGNS:  Vital Signs Last 24 Hrs  T(C): 36.7 (11 May 2018 05:00), Max: 37 (10 May 2018 13:45)  T(F): 98.1 (11 May 2018 05:00), Max: 98.6 (10 May 2018 13:45)  HR: 111 (11 May 2018 05:15) (82 - 111)  BP: 93/68 (11 May 2018 04:28) (87/61 - 147/72)  BP(mean): 76 (11 May 2018 04:28) (70 - 99)  RR: 16 (11 May 2018 05:15) (10 - 22)  SpO2: 100% (11 May 2018 05:15) (74% - 100%)    PHYSICAL EXAM:    Appearance: NAD. With trach and peg. Awake and alert, responding to Y/N questions. Patient noticeably diaphoretic (rectal T 99F)  HEENT: eyes open, PERRL. Conjunctiva clear b/l. Moist oral mucosa.  Cardiovascular: Irregularly, irregular- tachycardia. S1, S2  systolic murmur along lower left sternal border.   Respiratory: Rhonchi, decreased breath sounds, on right lung field > left lung field. Trach in place.   Gastrointestinal:  Soft, NTND. PEG in place, no erythema or draining noted at site. mild distention.   Extremities: 1-2+ dependent edema. LE venous stasis changes. No erythema b/l. SCDs in place  Vascular: DP diminished  Neurologic: wake and alert to verbal, does not track but responds to questions.  moves all ext, LUE more contracted compared to R.  Skin: LE stasis changes  MSK: no joint swelling    MEDICATIONS:  MEDICATIONS  (STANDING):  aspirin  chewable 81 milliGRAM(s) Oral daily  atorvastatin 80 milliGRAM(s) Oral at bedtime  chlorhexidine 0.12% Liquid 15 milliLiter(s) Swish and Spit two times a day  chlorhexidine 2% Cloths 1 Application(s) Topical daily  dextrose 10% + sodium chloride 0.9%. 1000 milliLiter(s) (50 mL/Hr) IV Continuous <Continuous>  dextrose 5%. 1000 milliLiter(s) (50 mL/Hr) IV Continuous <Continuous>  dextrose 50% Injectable 12.5 Gram(s) IV Push once  dextrose 50% Injectable 25 Gram(s) IV Push once  dextrose 50% Injectable 25 Gram(s) IV Push once  ergocalciferol Drops 6000 Unit(s) Oral daily  escitalopram 5 milliGRAM(s) Oral daily  folic acid 1 milliGRAM(s) Oral daily  insulin regular  human corrective regimen sliding scale   SubCutaneous every 6 hours  levETIRAcetam  Solution 500 milliGRAM(s) Oral every 24 hours  metoclopramide 5 milliGRAM(s) Oral every 6 hours  midodrine 15 milliGRAM(s) Oral every 8 hours  modafinil 100 milliGRAM(s) Oral <User Schedule>  multivitamin 1 Tablet(s) Oral daily  pantoprazole   Suspension 40 milliGRAM(s) Oral daily  predniSONE   Tablet 15 milliGRAM(s) Oral two times a day  silver sulfADIAZINE 1% Cream 1 Application(s) Topical daily  thiamine 100 milliGRAM(s) Oral daily    MEDICATIONS  (PRN):  acetaminophen    Suspension. 650 milliGRAM(s) Oral every 6 hours PRN Moderate Pain (4 - 6)  acetaminophen  Suppository 650 milliGRAM(s) Rectal every 6 hours PRN For Temp greater than 38 C (100.4 F)  dextrose Gel 1 Dose(s) Oral once PRN Blood Glucose LESS THAN 70 milliGRAM(s)/deciliter  glucagon  Injectable 1 milliGRAM(s) IntraMuscular once PRN Glucose LESS THAN 70 milligrams/deciliter      ALLERGIES:  Allergies    No Known Allergies    Intolerances        LABS:                        9.9    13.8  )-----------( 171      ( 11 May 2018 06:02 )             32.5     05-11    139  |  95<L>  |  64<H>  ----------------------------<  175<H>  5.3   |  24  |  3.43<H>    Ca    8.5      11 May 2018 06:02  Mg     2.1     05-11      PT/INR - ( 11 May 2018 06:02 )   PT: 41.2 sec;   INR: 3.62              CAPILLARY BLOOD GLUCOSE      POCT Blood Glucose.: 203 mg/dL (11 May 2018 07:01)      RADIOLOGY & ADDITIONAL TESTS: Reviewed.

## 2018-05-12 LAB
ANION GAP SERPL CALC-SCNC: 17 MMOL/L — SIGNIFICANT CHANGE UP (ref 5–17)
BUN SERPL-MCNC: 50 MG/DL — HIGH (ref 7–23)
CALCIUM SERPL-MCNC: 8.5 MG/DL — SIGNIFICANT CHANGE UP (ref 8.4–10.5)
CHLORIDE SERPL-SCNC: 96 MMOL/L — SIGNIFICANT CHANGE UP (ref 96–108)
CO2 SERPL-SCNC: 26 MMOL/L — SIGNIFICANT CHANGE UP (ref 22–31)
CREAT SERPL-MCNC: 2.67 MG/DL — HIGH (ref 0.5–1.3)
CULTURE RESULTS: SIGNIFICANT CHANGE UP
GLUCOSE BLDC GLUCOMTR-MCNC: 178 MG/DL — HIGH (ref 70–99)
GLUCOSE BLDC GLUCOMTR-MCNC: 201 MG/DL — HIGH (ref 70–99)
GLUCOSE BLDC GLUCOMTR-MCNC: 232 MG/DL — HIGH (ref 70–99)
GLUCOSE BLDC GLUCOMTR-MCNC: 351 MG/DL — HIGH (ref 70–99)
GLUCOSE BLDC GLUCOMTR-MCNC: 352 MG/DL — HIGH (ref 70–99)
GLUCOSE SERPL-MCNC: 165 MG/DL — HIGH (ref 70–99)
HCT VFR BLD CALC: 29 % — LOW (ref 39–50)
HGB BLD-MCNC: 8.8 G/DL — LOW (ref 13–17)
INR BLD: 2.82 — HIGH (ref 0.88–1.16)
MAGNESIUM SERPL-MCNC: 2 MG/DL — SIGNIFICANT CHANGE UP (ref 1.6–2.6)
MCHC RBC-ENTMCNC: 28.8 PG — SIGNIFICANT CHANGE UP (ref 27–34)
MCHC RBC-ENTMCNC: 30.3 G/DL — LOW (ref 32–36)
MCV RBC AUTO: 94.8 FL — SIGNIFICANT CHANGE UP (ref 80–100)
PHOSPHATE SERPL-MCNC: 4.9 MG/DL — HIGH (ref 2.5–4.5)
PLATELET # BLD AUTO: 156 K/UL — SIGNIFICANT CHANGE UP (ref 150–400)
POTASSIUM SERPL-MCNC: 4.6 MMOL/L — SIGNIFICANT CHANGE UP (ref 3.5–5.3)
POTASSIUM SERPL-SCNC: 4.6 MMOL/L — SIGNIFICANT CHANGE UP (ref 3.5–5.3)
PROTHROM AB SERPL-ACNC: 32 SEC — HIGH (ref 9.8–12.7)
RBC # BLD: 3.06 M/UL — LOW (ref 4.2–5.8)
RBC # FLD: 17.2 % — HIGH (ref 10.3–16.9)
SODIUM SERPL-SCNC: 139 MMOL/L — SIGNIFICANT CHANGE UP (ref 135–145)
SPECIMEN SOURCE: SIGNIFICANT CHANGE UP
WBC # BLD: 11.6 K/UL — HIGH (ref 3.8–10.5)
WBC # FLD AUTO: 11.6 K/UL — HIGH (ref 3.8–10.5)

## 2018-05-12 PROCEDURE — 99232 SBSQ HOSP IP/OBS MODERATE 35: CPT

## 2018-05-12 PROCEDURE — 71045 X-RAY EXAM CHEST 1 VIEW: CPT | Mod: 26

## 2018-05-12 PROCEDURE — 99233 SBSQ HOSP IP/OBS HIGH 50: CPT | Mod: GC

## 2018-05-12 RX ORDER — WARFARIN SODIUM 2.5 MG/1
1 TABLET ORAL ONCE
Qty: 0 | Refills: 0 | Status: COMPLETED | OUTPATIENT
Start: 2018-05-12 | End: 2018-05-12

## 2018-05-12 RX ORDER — WARFARIN SODIUM 2.5 MG/1
2.5 TABLET ORAL ONCE
Qty: 0 | Refills: 0 | Status: DISCONTINUED | OUTPATIENT
Start: 2018-05-12 | End: 2018-05-12

## 2018-05-12 RX ORDER — INSULIN LISPRO 100/ML
2 VIAL (ML) SUBCUTANEOUS EVERY 6 HOURS
Qty: 0 | Refills: 0 | Status: DISCONTINUED | OUTPATIENT
Start: 2018-05-12 | End: 2018-05-14

## 2018-05-12 RX ADMIN — Medication 1 TABLET(S): at 12:57

## 2018-05-12 RX ADMIN — MIDODRINE HYDROCHLORIDE 15 MILLIGRAM(S): 2.5 TABLET ORAL at 22:43

## 2018-05-12 RX ADMIN — Medication 1 MILLIGRAM(S): at 12:57

## 2018-05-12 RX ADMIN — PANTOPRAZOLE SODIUM 40 MILLIGRAM(S): 20 TABLET, DELAYED RELEASE ORAL at 13:00

## 2018-05-12 RX ADMIN — INSULIN HUMAN 2: 100 INJECTION, SOLUTION SUBCUTANEOUS at 06:51

## 2018-05-12 RX ADMIN — Medication 7.5 MILLIGRAM(S): at 17:35

## 2018-05-12 RX ADMIN — MIDODRINE HYDROCHLORIDE 15 MILLIGRAM(S): 2.5 TABLET ORAL at 06:29

## 2018-05-12 RX ADMIN — CHLORHEXIDINE GLUCONATE 15 MILLILITER(S): 213 SOLUTION TOPICAL at 21:35

## 2018-05-12 RX ADMIN — MIDODRINE HYDROCHLORIDE 15 MILLIGRAM(S): 2.5 TABLET ORAL at 16:23

## 2018-05-12 RX ADMIN — Medication 5 MILLIGRAM(S): at 21:35

## 2018-05-12 RX ADMIN — INSULIN HUMAN 4: 100 INJECTION, SOLUTION SUBCUTANEOUS at 12:05

## 2018-05-12 RX ADMIN — Medication 10 MILLIGRAM(S): at 06:29

## 2018-05-12 RX ADMIN — CHLORHEXIDINE GLUCONATE 1 APPLICATION(S): 213 SOLUTION TOPICAL at 06:29

## 2018-05-12 RX ADMIN — ATORVASTATIN CALCIUM 80 MILLIGRAM(S): 80 TABLET, FILM COATED ORAL at 21:35

## 2018-05-12 RX ADMIN — Medication 2 UNIT(S): at 16:41

## 2018-05-12 RX ADMIN — Medication 5 MILLIGRAM(S): at 10:14

## 2018-05-12 RX ADMIN — Medication 81 MILLIGRAM(S): at 12:57

## 2018-05-12 RX ADMIN — ERGOCALCIFEROL 6000 UNIT(S): 1.25 CAPSULE ORAL at 12:57

## 2018-05-12 RX ADMIN — LEVETIRACETAM 500 MILLIGRAM(S): 250 TABLET, FILM COATED ORAL at 10:29

## 2018-05-12 RX ADMIN — INSULIN HUMAN 4: 100 INJECTION, SOLUTION SUBCUTANEOUS at 00:13

## 2018-05-12 RX ADMIN — Medication 1 APPLICATION(S): at 14:49

## 2018-05-12 RX ADMIN — Medication 100 MILLIGRAM(S): at 12:57

## 2018-05-12 RX ADMIN — ESCITALOPRAM OXALATE 5 MILLIGRAM(S): 10 TABLET, FILM COATED ORAL at 12:57

## 2018-05-12 RX ADMIN — Medication 5 MILLIGRAM(S): at 16:23

## 2018-05-12 RX ADMIN — CHLORHEXIDINE GLUCONATE 15 MILLILITER(S): 213 SOLUTION TOPICAL at 10:15

## 2018-05-12 RX ADMIN — INSULIN HUMAN 10: 100 INJECTION, SOLUTION SUBCUTANEOUS at 16:38

## 2018-05-12 RX ADMIN — WARFARIN SODIUM 1 MILLIGRAM(S): 2.5 TABLET ORAL at 21:35

## 2018-05-12 RX ADMIN — Medication 5 MILLIGRAM(S): at 02:29

## 2018-05-12 NOTE — PROGRESS NOTE ADULT - PROBLEM SELECTOR PLAN 8
<resolved> Presented initially with septic shock, has completed course of abx for bacteremia with Klebsiella as well as fungemia. Further complicated by septic shock 2/2 to aspiration with increasing O2 requirements while on 7lACH and transferred back to MICU. Weaned off pressors and back on 7LACH for which patient Completed additional 10 day course of Zosyn.   - WBC trended down from 13 to 11. Afebrile, will low suspicion for infection at this   time  - continue to monitor    #Fungemia  < resolved> Noted to have Candida Tropicalis growing in blood cultures and completed fluconazole course.

## 2018-05-12 NOTE — PROGRESS NOTE ADULT - PROBLEM SELECTOR PLAN 4
Patient with severe systolic CHF with worsening volume status and pleural effusion  Will give albumin during HD  Pulmonary follow up for large right pleural effusion to consider thoracentesis.   Optimize CHF treatment per cardiology/CHF team

## 2018-05-12 NOTE — PROGRESS NOTE ADULT - PROBLEM SELECTOR PLAN 1
S/p tracheostomy 4/5. Previous bedside ultrasound notable for b/l pleural effusions and atelectatic lung-Likely related to fluid overload. If clinically worsening, can consider thoracentesis for fluid studies and cultures. Course further complicated by septic shock from aspiration PNA- Completed Zosyn course.  -c/w weaning as tolerated. CPAP with trach collar trials.

## 2018-05-12 NOTE — PROGRESS NOTE ADULT - PROBLEM SELECTOR PLAN 3
Treated for both septic as well as cardiogenic shock- requiring pressors and inotropes. Subsequently weaned off- On midodrine 15 mg TID. Has intermittently used albumin to tolerate dialysis. Further complicated by continued low pressures- over 24 hours ranging sbp .   - c/w Midodrine 15 mg q8h  - c/w prednisone 15mg BID titrated down to 7.5mg BID   - plan for HD per schedule ; renal recs     #Adrenal Insufficiency  BP have been low with SBP in , with MAP> 60.  - c/w prednisone 15mg BID   - F/u renal recs regarding HD

## 2018-05-12 NOTE — PROGRESS NOTE ADULT - SUBJECTIVE AND OBJECTIVE BOX
OVERNIGHT EVENTS:    SUBJECTIVE / INTERVAL HPI: Patient seen and examined at bedside.     VITAL SIGNS:  Vital Signs Last 24 Hrs  T(C): 37.4 (12 May 2018 05:55), Max: 37.6 (11 May 2018 09:10)  T(F): 99.3 (12 May 2018 05:55), Max: 99.6 (11 May 2018 09:10)  HR: 96 (12 May 2018 06:24) (84 - 126)  BP: 86/64 (12 May 2018 06:24) (84/56 - 119/80)  BP(mean): 70 (12 May 2018 06:24) (65 - 78)  RR: 10 (12 May 2018 06:24) (10 - 22)  SpO2: 100% (12 May 2018 06:24) (87% - 100%)    PHYSICAL EXAM:    General: WDWN  HEENT: NC/AT; PERRL, anicteric sclera; MMM  Neck: supple  Cardiovascular: +S1/S2; RRR  Respiratory: CTA B/L; no W/R/R  Gastrointestinal: soft, NT/ND; +BSx4  Extremities: WWP; no edema, clubbing or cyanosis  Vascular: 2+ radial, DP/PT pulses B/L  Neurological: AAOx3; no focal deficits    MEDICATIONS:  MEDICATIONS  (STANDING):  aspirin  chewable 81 milliGRAM(s) Oral daily  atorvastatin 80 milliGRAM(s) Oral at bedtime  chlorhexidine 0.12% Liquid 15 milliLiter(s) Swish and Spit two times a day  chlorhexidine 2% Cloths 1 Application(s) Topical daily  dextrose 5%. 1000 milliLiter(s) (50 mL/Hr) IV Continuous <Continuous>  dextrose 50% Injectable 12.5 Gram(s) IV Push once  dextrose 50% Injectable 25 Gram(s) IV Push once  dextrose 50% Injectable 25 Gram(s) IV Push once  ergocalciferol Drops 6000 Unit(s) Oral daily  escitalopram 5 milliGRAM(s) Oral daily  folic acid 1 milliGRAM(s) Oral daily  insulin regular  human corrective regimen sliding scale   SubCutaneous every 6 hours  levETIRAcetam  Solution 500 milliGRAM(s) Oral every 24 hours  metoclopramide 5 milliGRAM(s) Oral every 6 hours  midodrine 15 milliGRAM(s) Oral every 8 hours  multivitamin 1 Tablet(s) Oral daily  pantoprazole   Suspension 40 milliGRAM(s) Oral daily  predniSONE   Tablet 10 milliGRAM(s) Oral two times a day  silver sulfADIAZINE 1% Cream 1 Application(s) Topical daily  thiamine 100 milliGRAM(s) Oral daily    MEDICATIONS  (PRN):  acetaminophen    Suspension. 650 milliGRAM(s) Oral every 6 hours PRN Moderate Pain (4 - 6)  acetaminophen  Suppository 650 milliGRAM(s) Rectal every 6 hours PRN For Temp greater than 38 C (100.4 F)  dextrose Gel 1 Dose(s) Oral once PRN Blood Glucose LESS THAN 70 milliGRAM(s)/deciliter  glucagon  Injectable 1 milliGRAM(s) IntraMuscular once PRN Glucose LESS THAN 70 milligrams/deciliter      ALLERGIES:  Allergies    No Known Allergies    Intolerances        LABS:                        8.8    11.6  )-----------( 156      ( 12 May 2018 06:15 )             29.0     05-12    139  |  96  |  50<H>  ----------------------------<  165<H>  4.6   |  26  |  2.67<H>    Ca    8.5      12 May 2018 06:15  Phos  4.9     05-12  Mg     2.0     05-12      PT/INR - ( 12 May 2018 06:15 )   PT: 32.0 sec;   INR: 2.82              CAPILLARY BLOOD GLUCOSE      POCT Blood Glucose.: 178 mg/dL (12 May 2018 06:29)          RADIOLOGY & ADDITIONAL TESTS: Reviewed.      ASSESSMENT:    PLAN: OVERNIGHT EVENTS:    SUBJECTIVE / INTERVAL HPI: Patient seen and examined at bedside.     VITAL SIGNS:  Vital Signs Last 24 Hrs  T(C): 37.4 (12 May 2018 05:55), Max: 37.6 (11 May 2018 09:10)  T(F): 99.3 (12 May 2018 05:55), Max: 99.6 (11 May 2018 09:10)  HR: 96 (12 May 2018 06:24) (84 - 126)  BP: 86/64 (12 May 2018 06:24) (84/56 - 119/80)  BP(mean): 70 (12 May 2018 06:24) (65 - 78)  RR: 10 (12 May 2018 06:24) (10 - 22)  SpO2: 100% (12 May 2018 06:24) (87% - 100%)    PHYSICAL EXAM:    General: WDWN  HEENT: NC/AT; PERRL, anicteric sclera; MMM  Neck: supple  Cardiovascular: +S1/S2; RRR  Respiratory: CTA B/L; no W/R/R  Gastrointestinal: soft, NT/ND; +BSx4  Extremities: 1-2+ dependent edema. LE venous stasis changes. No erythema b/l. SCDs in place  Vascular: DP diminished  Neurologic: wake and alert to verbal, does not track but responds to questions.  moves all ext, LUE more contracted compared to R.  Skin: LE stasis changes    MEDICATIONS:  MEDICATIONS  (STANDING):  aspirin  chewable 81 milliGRAM(s) Oral daily  atorvastatin 80 milliGRAM(s) Oral at bedtime  chlorhexidine 0.12% Liquid 15 milliLiter(s) Swish and Spit two times a day  chlorhexidine 2% Cloths 1 Application(s) Topical daily  dextrose 5%. 1000 milliLiter(s) (50 mL/Hr) IV Continuous <Continuous>  dextrose 50% Injectable 12.5 Gram(s) IV Push once  dextrose 50% Injectable 25 Gram(s) IV Push once  dextrose 50% Injectable 25 Gram(s) IV Push once  ergocalciferol Drops 6000 Unit(s) Oral daily  escitalopram 5 milliGRAM(s) Oral daily  folic acid 1 milliGRAM(s) Oral daily  insulin regular  human corrective regimen sliding scale   SubCutaneous every 6 hours  levETIRAcetam  Solution 500 milliGRAM(s) Oral every 24 hours  metoclopramide 5 milliGRAM(s) Oral every 6 hours  midodrine 15 milliGRAM(s) Oral every 8 hours  multivitamin 1 Tablet(s) Oral daily  pantoprazole   Suspension 40 milliGRAM(s) Oral daily  predniSONE   Tablet 10 milliGRAM(s) Oral two times a day  silver sulfADIAZINE 1% Cream 1 Application(s) Topical daily  thiamine 100 milliGRAM(s) Oral daily    MEDICATIONS  (PRN):  acetaminophen    Suspension. 650 milliGRAM(s) Oral every 6 hours PRN Moderate Pain (4 - 6)  acetaminophen  Suppository 650 milliGRAM(s) Rectal every 6 hours PRN For Temp greater than 38 C (100.4 F)  dextrose Gel 1 Dose(s) Oral once PRN Blood Glucose LESS THAN 70 milliGRAM(s)/deciliter  glucagon  Injectable 1 milliGRAM(s) IntraMuscular once PRN Glucose LESS THAN 70 milligrams/deciliter      ALLERGIES:  Allergies    No Known Allergies    Intolerances        LABS:                        8.8    11.6  )-----------( 156      ( 12 May 2018 06:15 )             29.0     05-12    139  |  96  |  50<H>  ----------------------------<  165<H>  4.6   |  26  |  2.67<H>    Ca    8.5      12 May 2018 06:15  Phos  4.9     05-12  Mg     2.0     05-12      PT/INR - ( 12 May 2018 06:15 )   PT: 32.0 sec;   INR: 2.82              CAPILLARY BLOOD GLUCOSE      POCT Blood Glucose.: 178 mg/dL (12 May 2018 06:29)          RADIOLOGY & ADDITIONAL TESTS: Reviewed.      ASSESSMENT:    PLAN: OVERNIGHT EVENTS: Placed on AC ventilation. FS controlled.   SUBJECTIVE / INTERVAL HPI: Patient seen and examined at bedside. Noncommunicative, cannot obtain ROS.     VITAL SIGNS:  Vital Signs Last 24 Hrs  T(C): 37.4 (12 May 2018 05:55), Max: 37.6 (11 May 2018 09:10)  T(F): 99.3 (12 May 2018 05:55), Max: 99.6 (11 May 2018 09:10)  HR: 96 (12 May 2018 06:24) (84 - 126)  BP: 86/64 (12 May 2018 06:24) (84/56 - 119/80)  BP(mean): 70 (12 May 2018 06:24) (65 - 78)  RR: 10 (12 May 2018 06:24) (10 - 22)  SpO2: 100% (12 May 2018 06:24) (87% - 100%)    PHYSICAL EXAM:  General: WDWN  HEENT: NC/AT; PERRL, anicteric sclera; MMM  Neck: supple  Cardiovascular: +S1/S2; RRR  Respiratory: CTA B/L; no W/R/R  Gastrointestinal: soft, NT/ND; +BSx4  Extremities: 1-2+ dependent edema. LE venous stasis changes. No erythema b/l. SCDs in place  Vascular: DP diminished  Neurologic: wake and alert to verbal, does not track but responds to questions.  moves all ext, LUE more contracted compared to R.  Skin: LE stasis changes    MEDICATIONS:  MEDICATIONS  (STANDING):  aspirin  chewable 81 milliGRAM(s) Oral daily  atorvastatin 80 milliGRAM(s) Oral at bedtime  chlorhexidine 0.12% Liquid 15 milliLiter(s) Swish and Spit two times a day  chlorhexidine 2% Cloths 1 Application(s) Topical daily  dextrose 5%. 1000 milliLiter(s) (50 mL/Hr) IV Continuous <Continuous>  dextrose 50% Injectable 12.5 Gram(s) IV Push once  dextrose 50% Injectable 25 Gram(s) IV Push once  dextrose 50% Injectable 25 Gram(s) IV Push once  ergocalciferol Drops 6000 Unit(s) Oral daily  escitalopram 5 milliGRAM(s) Oral daily  folic acid 1 milliGRAM(s) Oral daily  insulin regular  human corrective regimen sliding scale   SubCutaneous every 6 hours  levETIRAcetam  Solution 500 milliGRAM(s) Oral every 24 hours  metoclopramide 5 milliGRAM(s) Oral every 6 hours  midodrine 15 milliGRAM(s) Oral every 8 hours  multivitamin 1 Tablet(s) Oral daily  pantoprazole   Suspension 40 milliGRAM(s) Oral daily  predniSONE   Tablet 10 milliGRAM(s) Oral two times a day  silver sulfADIAZINE 1% Cream 1 Application(s) Topical daily  thiamine 100 milliGRAM(s) Oral daily    MEDICATIONS  (PRN):  acetaminophen    Suspension. 650 milliGRAM(s) Oral every 6 hours PRN Moderate Pain (4 - 6)  acetaminophen  Suppository 650 milliGRAM(s) Rectal every 6 hours PRN For Temp greater than 38 C (100.4 F)  dextrose Gel 1 Dose(s) Oral once PRN Blood Glucose LESS THAN 70 milliGRAM(s)/deciliter  glucagon  Injectable 1 milliGRAM(s) IntraMuscular once PRN Glucose LESS THAN 70 milligrams/deciliter      ALLERGIES:  Allergies  No Known Allergies  Intolerances        LABS:                        8.8    11.6  )-----------( 156      ( 12 May 2018 06:15 )             29.0     05-12    139  |  96  |  50<H>  ----------------------------<  165<H>  4.6   |  26  |  2.67<H>    Ca    8.5      12 May 2018 06:15  Phos  4.9     05-12  Mg     2.0     05-12      PT/INR - ( 12 May 2018 06:15 )   PT: 32.0 sec;   INR: 2.82           CAPILLARY BLOOD GLUCOSE      POCT Blood Glucose.: 178 mg/dL (12 May 2018 06:29)      RADIOLOGY & ADDITIONAL TESTS: Reviewed.

## 2018-05-12 NOTE — PROGRESS NOTE ADULT - SUBJECTIVE AND OBJECTIVE BOX
Patient seen and examined at bedside.   Patient is a 63 year old male with a history of HFrEF 10-15% due to ischemic cardiomyopathy, s/p AICD, ?atrial fibrillation, hypertension, diabetes mellitus type 2, gout, and CKD for whom nephrology was called for GIMLER from ATN requiring hemodialysis. Patient appears clinically unchanged. Patient was last dialyzed 5/11 with UF 1.5.    acetaminophen    Suspension. 650 milliGRAM(s) every 6 hours PRN  acetaminophen  Suppository 650 milliGRAM(s) every 6 hours PRN  aspirin  chewable 81 milliGRAM(s) daily  atorvastatin 80 milliGRAM(s) at bedtime  chlorhexidine 0.12% Liquid 15 milliLiter(s) two times a day  chlorhexidine 2% Cloths 1 Application(s) daily  dextrose 5%. 1000 milliLiter(s) <Continuous>  dextrose 50% Injectable 12.5 Gram(s) once  dextrose 50% Injectable 25 Gram(s) once  dextrose 50% Injectable 25 Gram(s) once  dextrose Gel 1 Dose(s) once PRN  ergocalciferol Drops 6000 Unit(s) daily  escitalopram 5 milliGRAM(s) daily  folic acid 1 milliGRAM(s) daily  glucagon  Injectable 1 milliGRAM(s) once PRN  insulin lispro Injectable (HumaLOG) 2 Unit(s) every 6 hours  insulin regular  human corrective regimen sliding scale   every 6 hours  levETIRAcetam  Solution 500 milliGRAM(s) every 24 hours  metoclopramide 5 milliGRAM(s) every 6 hours  midodrine 15 milliGRAM(s) every 8 hours  multivitamin 1 Tablet(s) daily  pantoprazole   Suspension 40 milliGRAM(s) daily  predniSONE   Tablet 10 milliGRAM(s) two times a day  silver sulfADIAZINE 1% Cream 1 Application(s) daily  thiamine 100 milliGRAM(s) daily  warfarin 1 milliGRAM(s) once    Allergies    No Known Allergies    Intolerances    T(C): , Max: 37.4 (05-12-18 @ 05:55)  T(F): , Max: 99.3 (05-12-18 @ 05:55)  HR: 90 (05-12-18 @ 08:30)  BP: 85/65 (05-12-18 @ 08:30)  BP(mean): 71 (05-12-18 @ 08:30)  RR: 10 (05-12-18 @ 08:30)  SpO2: 99% (05-12-18 @ 13:32)    05-11 @ 07:01  -  05-12 @ 07:00  --------------------------------------------------------  IN:    Vital HN: 600 mL  Total IN: 600 mL    OUT:    Other: 1500 mL  Total OUT: 1500 mL    Total NET: -900 mL    LABS:                        8.8    11.6  )-----------( 156      ( 12 May 2018 06:15 )             29.0     05-12    139  |  96  |  50<H>  ----------------------------<  165<H>  4.6   |  26  |  2.67<H>    Ca    8.5      12 May 2018 06:15  Phos  4.9     05-12  Mg     2.0     05-12    PT/INR - ( 12 May 2018 06:15 )   PT: 32.0 sec;   INR: 2.82

## 2018-05-12 NOTE — PROGRESS NOTE ADULT - SUBJECTIVE AND OBJECTIVE BOX
Interval Events:  Patient seen and examined at bedside. ELSIE. Tolerated trach collar for a few hours yesterday. Currently doing well on CPAP.    MEDICATIONS:  Pulmonary:    Antimicrobials:    Anticoagulants:  aspirin  chewable 81 milliGRAM(s) Oral daily  warfarin 1 milliGRAM(s) Oral once    Cardiac:  midodrine 15 milliGRAM(s) Oral every 8 hours      Allergies    No Known Allergies    Intolerances        Vital Signs Last 24 Hrs  T(C): 36.8 (12 May 2018 14:02), Max: 37.4 (12 May 2018 05:55)  T(F): 98.3 (12 May 2018 14:02), Max: 99.3 (12 May 2018 05:55)  HR: 108 (12 May 2018 16:04) (84 - 108)  BP: 106/77 (12 May 2018 16:04) (84/56 - 106/77)  BP(mean): 85 (12 May 2018 16:04) (65 - 85)  RR: 10 (12 May 2018 16:04) (10 - 22)  SpO2: 100% (12 May 2018 16:04) (87% - 100%)    05-11 @ 07:01  -  05-12 @ 07:00  --------------------------------------------------------  IN: 600 mL / OUT: 1500 mL / NET: -900 mL      Mode: CPAP with PS  RR (machine):   TV (machine):   FiO2: 50  PEEP: 5  PS: 5  ITime:   MAP: 10  PIP: 19      PHYSICAL EXAM:  General: Well developed; well nourished; in no acute distress  HEENT: PERRL, EOMI, non icteric  Neck: Supple; no masses  Respiratory: b/l rhonchi, no wheezing  Cardiovascular: Regular rate and rhythm. S1 and S2 Normal;   Gastrointestinal: Soft non-tender non-distended;   Extremities: WWP, no edema, no cyanosis  Neurological: Alert and oriented x3  Skin: Warm and dry. No obvious rash    LABS:      CBC Full  -  ( 12 May 2018 06:15 )  WBC Count : 11.6 K/uL  Hemoglobin : 8.8 g/dL  Hematocrit : 29.0 %  Platelet Count - Automated : 156 K/uL  Mean Cell Volume : 94.8 fL  Mean Cell Hemoglobin : 28.8 pg  Mean Cell Hemoglobin Concentration : 30.3 g/dL  Auto Neutrophil # : x  Auto Lymphocyte # : x  Auto Monocyte # : x  Auto Eosinophil # : x  Auto Basophil # : x  Auto Neutrophil % : x  Auto Lymphocyte % : x  Auto Monocyte % : x  Auto Eosinophil % : x  Auto Basophil % : x    05-12    139  |  96  |  50<H>  ----------------------------<  165<H>  4.6   |  26  |  2.67<H>    Ca    8.5      12 May 2018 06:15  Phos  4.9     05-12  Mg     2.0     05-12      PT/INR - ( 12 May 2018 06:15 )   PT: 32.0 sec;   INR: 2.82                            RADIOLOGY imaging reviewed

## 2018-05-12 NOTE — PROGRESS NOTE ADULT - PROBLEM SELECTOR PLAN 1
In the setting of HD w/o sufficient fluid removal, anasarca.  Yesterday bedside US w/o good pocket to tap.   CXR this AM grossly unchanged.   Given the fact that he is doing today and tolerated trach collar, will hold off on placing a chest tube. Will hopefully be able to tolerate HD better which will likely decrease effusions.

## 2018-05-12 NOTE — PROGRESS NOTE ADULT - PROBLEM SELECTOR PLAN 6
Hx of Afib and LV thrombus on coumadin prior to admission. Most recent TTE with [facility] shows no LV thrombus currently.   - HR has remained stable, Lopressor has been held in setting of hypotension and plan for Dig for rate control if RVR- goal <110.  - INR 2.82 this AM (therapeutic), dosed 1mg coumadin for tonight instead of 2.5mg    #LV thrombus  LV thrombus noted on RUKHSANA on 4/10 for which patient has been on hep gtt. C/w bridge.

## 2018-05-12 NOTE — PROGRESS NOTE ADULT - PROBLEM SELECTOR PLAN 1
Patient is a 63 year old male with GILMER on CKD requiring dialysis. Patient was last dialyzed 5/11 with UF of 1.5L     P - Patient does not require emergent dialysis as electrolytes are stable   Anticipate next dialysis on Monday 5/14

## 2018-05-12 NOTE — PROGRESS NOTE ADULT - PROBLEM SELECTOR PLAN 2
DM2 on Januvia at home. HbA1C 8.6. Has been on Lantus 42U qHS, insulin 7 units q6h. Difficulty controlling glucose levels. Episodes of hypoglycemia today.  - Added 2units of humalog q6h to regimen to help control FS

## 2018-05-12 NOTE — PROGRESS NOTE ADULT - ATTENDING COMMENTS
Pt awake on WNSN1Je% and with RR 20-26. In no distress. Continue coumadin tonight and fluid removal with HD. continue enteral feeds and low dose insulin coverage.

## 2018-05-12 NOTE — PROGRESS NOTE ADULT - ASSESSMENT
63M PMH HFrEF 10-15% (ischemic), MI, s/p AICD vs PPM, possible Afib, HTN, DM2 on insulin, possible CKD, and gout, who presented with a chief complaint of generalized weakness (3/10/2018) s/p septic shock likely 2/2 LE cellulitis complicated by ATN requiring dialysis. Course complicated by AMS likely from brainstem infarct 2/2 LV thrombus and/or toxic metabolic encephalopathy and found to have candidemia and sepsis 2/2 klebsiella bacteremia s/p fluconazole and CTX. Course further complicated by aspiration PNA and completed course with Zosyn- remains off abx.  Goals of care discussion for which patient made DNR, but with continued escalation of care. Management with weaning vent for now chronic respiratory failure and weaning albumin as blood pressures tolerate. Right effusion.

## 2018-05-12 NOTE — PROGRESS NOTE ADULT - ASSESSMENT
64 yo M, PMH of CHF, HTN, DM, presented w septic shock 2/2 cellulitis, respiratory failure s/p trach, brainstem infarct, hospital course also complicated by ATN and need for HD, pneumonia. Now with worsening R effusion

## 2018-05-13 LAB
ANION GAP SERPL CALC-SCNC: 17 MMOL/L — SIGNIFICANT CHANGE UP (ref 5–17)
APTT BLD: 40.5 SEC — HIGH (ref 27.5–37.4)
BUN SERPL-MCNC: 58 MG/DL — HIGH (ref 7–23)
CALCIUM SERPL-MCNC: 8.9 MG/DL — SIGNIFICANT CHANGE UP (ref 8.4–10.5)
CHLORIDE SERPL-SCNC: 94 MMOL/L — LOW (ref 96–108)
CO2 SERPL-SCNC: 26 MMOL/L — SIGNIFICANT CHANGE UP (ref 22–31)
CREAT SERPL-MCNC: 3.38 MG/DL — HIGH (ref 0.5–1.3)
GLUCOSE BLDC GLUCOMTR-MCNC: 177 MG/DL — HIGH (ref 70–99)
GLUCOSE BLDC GLUCOMTR-MCNC: 184 MG/DL — HIGH (ref 70–99)
GLUCOSE BLDC GLUCOMTR-MCNC: 188 MG/DL — HIGH (ref 70–99)
GLUCOSE BLDC GLUCOMTR-MCNC: 188 MG/DL — HIGH (ref 70–99)
GLUCOSE BLDC GLUCOMTR-MCNC: 235 MG/DL — HIGH (ref 70–99)
GLUCOSE SERPL-MCNC: 182 MG/DL — HIGH (ref 70–99)
HCT VFR BLD CALC: 29.3 % — LOW (ref 39–50)
HGB BLD-MCNC: 8.8 G/DL — LOW (ref 13–17)
INR BLD: 2.38 — HIGH (ref 0.88–1.16)
MAGNESIUM SERPL-MCNC: 2.2 MG/DL — SIGNIFICANT CHANGE UP (ref 1.6–2.6)
MCHC RBC-ENTMCNC: 28.6 PG — SIGNIFICANT CHANGE UP (ref 27–34)
MCHC RBC-ENTMCNC: 30 G/DL — LOW (ref 32–36)
MCV RBC AUTO: 95.1 FL — SIGNIFICANT CHANGE UP (ref 80–100)
PLATELET # BLD AUTO: 161 K/UL — SIGNIFICANT CHANGE UP (ref 150–400)
POTASSIUM SERPL-MCNC: 5.1 MMOL/L — SIGNIFICANT CHANGE UP (ref 3.5–5.3)
POTASSIUM SERPL-SCNC: 5.1 MMOL/L — SIGNIFICANT CHANGE UP (ref 3.5–5.3)
PROTHROM AB SERPL-ACNC: 26.9 SEC — HIGH (ref 9.8–12.7)
RBC # BLD: 3.08 M/UL — LOW (ref 4.2–5.8)
RBC # FLD: 17.5 % — HIGH (ref 10.3–16.9)
SODIUM SERPL-SCNC: 137 MMOL/L — SIGNIFICANT CHANGE UP (ref 135–145)
WBC # BLD: 11.7 K/UL — HIGH (ref 3.8–10.5)
WBC # FLD AUTO: 11.7 K/UL — HIGH (ref 3.8–10.5)

## 2018-05-13 PROCEDURE — 99233 SBSQ HOSP IP/OBS HIGH 50: CPT | Mod: GC

## 2018-05-13 PROCEDURE — 99232 SBSQ HOSP IP/OBS MODERATE 35: CPT

## 2018-05-13 PROCEDURE — 71045 X-RAY EXAM CHEST 1 VIEW: CPT | Mod: 26

## 2018-05-13 RX ORDER — WARFARIN SODIUM 2.5 MG/1
2 TABLET ORAL ONCE
Qty: 0 | Refills: 0 | Status: COMPLETED | OUTPATIENT
Start: 2018-05-13 | End: 2018-05-13

## 2018-05-13 RX ADMIN — MIDODRINE HYDROCHLORIDE 15 MILLIGRAM(S): 2.5 TABLET ORAL at 22:22

## 2018-05-13 RX ADMIN — Medication 81 MILLIGRAM(S): at 11:15

## 2018-05-13 RX ADMIN — MIDODRINE HYDROCHLORIDE 15 MILLIGRAM(S): 2.5 TABLET ORAL at 06:10

## 2018-05-13 RX ADMIN — CHLORHEXIDINE GLUCONATE 15 MILLILITER(S): 213 SOLUTION TOPICAL at 21:11

## 2018-05-13 RX ADMIN — Medication 2 UNIT(S): at 07:17

## 2018-05-13 RX ADMIN — Medication 1 MILLIGRAM(S): at 11:16

## 2018-05-13 RX ADMIN — LEVETIRACETAM 500 MILLIGRAM(S): 250 TABLET, FILM COATED ORAL at 11:14

## 2018-05-13 RX ADMIN — ATORVASTATIN CALCIUM 80 MILLIGRAM(S): 80 TABLET, FILM COATED ORAL at 21:10

## 2018-05-13 RX ADMIN — Medication 1 TABLET(S): at 11:17

## 2018-05-13 RX ADMIN — Medication 5 MILLIGRAM(S): at 14:22

## 2018-05-13 RX ADMIN — Medication 7.5 MILLIGRAM(S): at 05:56

## 2018-05-13 RX ADMIN — Medication 5 MILLIGRAM(S): at 03:43

## 2018-05-13 RX ADMIN — INSULIN HUMAN 4: 100 INJECTION, SOLUTION SUBCUTANEOUS at 00:38

## 2018-05-13 RX ADMIN — INSULIN HUMAN 2: 100 INJECTION, SOLUTION SUBCUTANEOUS at 17:54

## 2018-05-13 RX ADMIN — Medication 100 MILLIGRAM(S): at 11:17

## 2018-05-13 RX ADMIN — WARFARIN SODIUM 2 MILLIGRAM(S): 2.5 TABLET ORAL at 21:11

## 2018-05-13 RX ADMIN — Medication 7.5 MILLIGRAM(S): at 17:54

## 2018-05-13 RX ADMIN — ESCITALOPRAM OXALATE 5 MILLIGRAM(S): 10 TABLET, FILM COATED ORAL at 11:16

## 2018-05-13 RX ADMIN — ERGOCALCIFEROL 6000 UNIT(S): 1.25 CAPSULE ORAL at 11:16

## 2018-05-13 RX ADMIN — CHLORHEXIDINE GLUCONATE 1 APPLICATION(S): 213 SOLUTION TOPICAL at 06:10

## 2018-05-13 RX ADMIN — PANTOPRAZOLE SODIUM 40 MILLIGRAM(S): 20 TABLET, DELAYED RELEASE ORAL at 11:18

## 2018-05-13 RX ADMIN — MIDODRINE HYDROCHLORIDE 15 MILLIGRAM(S): 2.5 TABLET ORAL at 14:23

## 2018-05-13 RX ADMIN — Medication 2 UNIT(S): at 11:59

## 2018-05-13 RX ADMIN — Medication 1 APPLICATION(S): at 11:17

## 2018-05-13 RX ADMIN — Medication 5 MILLIGRAM(S): at 09:25

## 2018-05-13 RX ADMIN — INSULIN HUMAN 2: 100 INJECTION, SOLUTION SUBCUTANEOUS at 11:55

## 2018-05-13 RX ADMIN — Medication 5 MILLIGRAM(S): at 21:11

## 2018-05-13 RX ADMIN — CHLORHEXIDINE GLUCONATE 15 MILLILITER(S): 213 SOLUTION TOPICAL at 09:25

## 2018-05-13 RX ADMIN — INSULIN HUMAN 2: 100 INJECTION, SOLUTION SUBCUTANEOUS at 07:17

## 2018-05-13 RX ADMIN — Medication 2 UNIT(S): at 17:54

## 2018-05-13 RX ADMIN — Medication 2 UNIT(S): at 00:38

## 2018-05-13 NOTE — PROGRESS NOTE ADULT - PROBLEM SELECTOR PLAN 9
CHF with EF 10-15% 2/2 ischemic cardiomyopathy s/p AICD. Elevated BNP on admission with R sided effusion and edema. Patient with persistent pleural effusions on CXR and US and b/l dependent edema.   - c/w holding ACE/BB in setting of sepsis/ hypotension on midodrine    #CAD- Hx of MI and ischemic CM s/p AICD, STEMI 7/2017, was started on Aspirin and Brilinta.   - c/w aspirin 81 and Atorvastatin 80mg qhs. CHF with EF 10-15% 2/2 ischemic cardiomyopathy s/p AICD. Elevated BNP on admission with R sided effusion and edema. Patient with persistent pleural effusions on CXR and US and b/l dependent edema.   - c/w holding ACE/BB in setting of sepsis/ hypotension on midodrine, no UOP therefore no lasix/diuresis- HD for     #CAD- Hx of MI and ischemic CM s/p AICD, STEMI 7/2017, was started on Aspirin and Brilinta.   - c/w aspirin 81 and Atorvastatin 80mg qhs. CHF with EF 10-15% 2/2 ischemic cardiomyopathy s/p AICD. Elevated BNP on admission with R sided effusion and edema. Patient with persistent pleural effusions on CXR and US and b/l dependent edema.   - c/w holding ACE/BB in setting of sepsis/ hypotension on midodrine, no UOP therefore no lasix/diuresis- HD for fluid balance.    #CAD- Hx of MI and ischemic CM s/p AICD, STEMI 7/2017, was started on Aspirin and Brilinta.   - c/w aspirin 81 and Atorvastatin 80mg qhs.

## 2018-05-13 NOTE — PROGRESS NOTE ADULT - ASSESSMENT
63M PMH HFrEF 10-15% (ischemic), MI, s/p AICD vs PPM, possible Afib, HTN, DM2 on insulin, possible CKD, and gout, who presented with a chief complaint of generalized weakness (3/10/2018) s/p septic shock likely 2/2 LE cellulitis complicated by ATN requiring dialysis. Course complicated by AMS likely from brainstem infarct 2/2 LV thrombus and/or toxic metabolic encephalopathy and found to have candidemia and sepsis 2/2 klebsiella bacteremia s/p fluconazole and CTX. Course further complicated by aspiration PNA and completed course with Zosyn- remains off abx.  Goals of care discussion for which patient made DNR, but with continued escalation of care. Management with weaning vent for now chronic respiratory failure and weaning albumin as blood pressures tolerate. Right effusion. 63M PMH HFrEF 10-15% (ischemic), MI, s/p AICD vs PPM, possible Afib, HTN, DM2 on insulin, possible CKD, and gout, who presented with a chief complaint of generalized weakness (3/10/2018) s/p septic shock likely 2/2 LE cellulitis complicated by ATN requiring dialysis. Course complicated by AMS likely from brainstem infarct 2/2 LV thrombus vs toxic metabolic encephalopathy. Also complicated by development of candidemia and sepsis 2/2 klebsiella bacteremia s/p fluconazole and CTX- For which has since resolved. Also completed   Goals of care discussion for which patient made DNR, but with continued escalation of care. Management with weaning vent for now chronic respiratory failure and weaning albumin as blood pressures tolerate. 63M PMH HFrEF 10-15% (ischemic), MI, s/p AICD vs PPM, possible Afib, HTN, DM2 on insulin, possible CKD, and gout, who presented with a chief complaint of generalized weakness (3/10/2018) s/p septic shock likely 2/2 LE cellulitis complicated by ATN requiring dialysis. Course complicated by AMS likely from brainstem infarct 2/2 LV thrombus vs toxic metabolic encephalopathy. Further complicated by development of candidemia and sepsis 2/2 klebsiella bacteremia s/p fluconazole and CTX- For which has since resolved. Completed course of Zosyn for aspiration PNA.   Goals of care discussion for which patient made DNR, but with continued escalation of care. Continues to undergo management for respiratory failure with weaning as tolerated with CPAP, trach collar trials. Weaning from albumin in setting of hypotension.

## 2018-05-13 NOTE — PROGRESS NOTE ADULT - PROBLEM SELECTOR PLAN 5
In setting of septic/cardiogenic shock-renal failure likely 2/2 to ischemic ATN with hypoperfusion. Baseline unknown but presenting Cr 3.93 with associated metabolic derangements. Started on HD during course with renal following. Permacath replaced multiple times over course for bacteremia and fungemia. Now with L subclavian HD cath. Tolerated HD yesterday with 1.1L off.  -f/u renal recs regarding further HD. Will attempt to wean albumin if BP tolerates.     #Elevated AG  In setting of poor tolerance AG 2/2 to uremia. Lactate negative. Glucose improving controlled. Low suspicion for DKA or lactic acidosis.   - HD per neuro recs  - Monitor BMP for electrolyte abnormalities. In setting of septic/cardiogenic shock-renal failure likely 2/2 to ischemic ATN with hypoperfusion. Baseline unknown but presenting Cr 3.93 with associated metabolic derangements. Started on HD during course with renal following. Permacath replaced multiple times over course for bacteremia and fungemia. Now with L subclavian HD cath. Tolerated HD 1.5L on 5/11.   -f/u renal recs regarding further HD. Will attempt to wean albumin if BP tolerates.     #Elevated AG  In setting of poor tolerance to HD AG likely 2/2 to uremia. Lactate negative. Glucose improving controlled. Low suspicion for DKA or lactic acidosis.   - HD per neuro recs  - Monitor BMP for electrolyte abnormalities.

## 2018-05-13 NOTE — PROGRESS NOTE ADULT - PROBLEM SELECTOR PLAN 2
DM2 on Januvia at home. HbA1C 8.6. Has been on Lantus 42U qHS, insulin 7 units q6h. Difficulty controlling glucose levels. Episodes of hypoglycemia today.  - Added 2units of humalog q6h to regimen to help control FS DM2 on Januvia at home. HbA1C 8.6. Has been on Lantus 42U qHS, insulin 7 units q6h. Difficulty controlling glucose levels with mulitple episodes of hypoglycemia and hyperglycemia.  - Remains on 2U lispro q6h, no Lantus and Mod ISS. Adjust based on requirements. DM2 on Januvia at home. HbA1C 8.6. Has been on Lantus 42U qHS, insulin 7 units q6h. Difficulty controlling glucose levels with multiple episodes of hypoglycemia and hyperglycemia.  - Remains on 2U lispro q6h, no Lantus and Mod ISS. Adjust based on requirements.

## 2018-05-13 NOTE — PROGRESS NOTE ADULT - PROBLEM SELECTOR PLAN 8
<resolved> Presented initially with septic shock, has completed course of abx for bacteremia with Klebsiella as well as fungemia. Further complicated by septic shock 2/2 to aspiration with increasing O2 requirements while on 7lACH and transferred back to MICU. Weaned off pressors and back on 7LACH for which patient Completed additional 10 day course of Zosyn.   - WBC trended down from 13 to 11. Afebrile, will low suspicion for infection at this   time  - continue to monitor    #Fungemia  < resolved> Noted to have Candida Tropicalis growing in blood cultures and completed fluconazole course. Resolved. Presented initially with septic shock, has completed course of abx for bacteremia with Klebsiella as well as fungemia. Further complicated by septic shock 2/2 to aspiration with increasing O2 requirements while on 7lACH and transferred back to MICU. Weaned off pressors and back on 7LACH for which patient Completed additional 10 day course of Zosyn.   - WBC remains on downtrend 11.7  - continue to monitor    #Fungemia  RESOLVED. Noted to have Candida Tropicalis growing in blood cultures and completed fluconazole course.

## 2018-05-13 NOTE — PROGRESS NOTE ADULT - SUBJECTIVE AND OBJECTIVE BOX
PROGRESS NOTE    CC: septic/cardiogenic shock, ATN on HD, CVA  Overnight Events: No acute overnight events. Pt on ACVC vent overnight. Blood sugars remain elevated overnight.  Interval History:   ROS:    OBJECTIVE  Vitals:  T(C): 36.5 (05-13-18 @ 06:00), Max: 37.3 (05-12-18 @ 09:18)  HR: 82 (05-13-18 @ 05:13) (79 - 108)  BP: 94/65 (05-13-18 @ 05:13) (82/62 - 106/77)  RR: 10 (05-13-18 @ 05:13) (10 - 16)  SpO2: 100% (05-13-18 @ 05:13) (96% - 100%)  Wt(kg): --  Mode: AC/ CMV (Assist Control/ Continuous Mandatory Ventilation)  RR (machine): 10  TV (machine): 500  FiO2: 50  PEEP: 5  ITime: 1  MAP: 8.7  PIP: 19    I/O:  I&O's Summary    11 May 2018 07:01  -  12 May 2018 07:00  --------------------------------------------------------  IN: 600 mL / OUT: 1500 mL / NET: -900 mL        PHYSICAL EXAM:  Appearance: NAD. Speaking in full sentences.   HEENT:   Conjunctiva clear b/l. Moist oral mucosa.  Cardiovascular: RRR with no murmurs.  Respiratory: Lungs CTAB.   Gastrointestinal:  Soft, nontender. Non-distended. Non-rigid.	  Extremities: No edema b/l. No erythema b/l. LE WWP b/l.  Vascular: DP 2+ b/l.  Neurologic:  Alert and awake. Moving all extremities. Following commands. Making eye contact.  	  LABS:                        8.8    11.6  )-----------( 156      ( 12 May 2018 06:15 )             29.0     05-12    139  |  96  |  50<H>  ----------------------------<  165<H>  4.6   |  26  |  2.67<H>    Ca    8.5      12 May 2018 06:15  Phos  4.9     05-12  Mg     2.0     05-12      PT/INR - ( 12 May 2018 06:15 )   PT: 32.0 sec;   INR: 2.82          RADIOLOGY & ADDITIONAL TESTS:  Reviewed .    MEDICATIONS  (STANDING):  aspirin  chewable 81 milliGRAM(s) Oral daily  atorvastatin 80 milliGRAM(s) Oral at bedtime  chlorhexidine 0.12% Liquid 15 milliLiter(s) Swish and Spit two times a day  chlorhexidine 2% Cloths 1 Application(s) Topical daily  dextrose 5%. 1000 milliLiter(s) (50 mL/Hr) IV Continuous <Continuous>  dextrose 50% Injectable 12.5 Gram(s) IV Push once  dextrose 50% Injectable 25 Gram(s) IV Push once  dextrose 50% Injectable 25 Gram(s) IV Push once  ergocalciferol Drops 6000 Unit(s) Oral daily  escitalopram 5 milliGRAM(s) Oral daily  folic acid 1 milliGRAM(s) Oral daily  insulin lispro Injectable (HumaLOG) 2 Unit(s) SubCutaneous every 6 hours  insulin regular  human corrective regimen sliding scale   SubCutaneous every 6 hours  levETIRAcetam  Solution 500 milliGRAM(s) Oral every 24 hours  metoclopramide 5 milliGRAM(s) Oral every 6 hours  midodrine 15 milliGRAM(s) Oral every 8 hours  multivitamin 1 Tablet(s) Oral daily  pantoprazole   Suspension 40 milliGRAM(s) Oral daily  predniSONE   Tablet 7.5 milliGRAM(s) Oral two times a day  silver sulfADIAZINE 1% Cream 1 Application(s) Topical daily  thiamine 100 milliGRAM(s) Oral daily    MEDICATIONS  (PRN):  acetaminophen    Suspension. 650 milliGRAM(s) Oral every 6 hours PRN Moderate Pain (4 - 6)  acetaminophen  Suppository 650 milliGRAM(s) Rectal every 6 hours PRN For Temp greater than 38 C (100.4 F)  dextrose Gel 1 Dose(s) Oral once PRN Blood Glucose LESS THAN 70 milliGRAM(s)/deciliter  glucagon  Injectable 1 milliGRAM(s) IntraMuscular once PRN Glucose LESS THAN 70 milligrams/deciliter PROGRESS NOTE    CC: septic/cardiogenic shock, ATN on HD, CVA  Overnight Events: No acute overnight events. Pt on ACVC vent overnight. Blood sugars remain elevated overnight.  Interval History: More lethargic this AM, arousable though minimal response to questions.   ROS: As above.    OBJECTIVE  Vitals:  T(C): 36.5 (05-13-18 @ 06:00), Max: 37.3 (05-12-18 @ 09:18)  HR: 82 (05-13-18 @ 05:13) (79 - 108)  BP: 94/65 (05-13-18 @ 05:13) (82/62 - 106/77)  RR: 10 (05-13-18 @ 05:13) (10 - 16)  SpO2: 100% (05-13-18 @ 05:13) (96% - 100%)  Wt(kg): --  Mode: AC/ CMV (Assist Control/ Continuous Mandatory Ventilation)  RR (machine): 10  TV (machine): 500  FiO2: 50  PEEP: 5  ITime: 1  MAP: 8.7  PIP: 19    I/O:  I&O's Summary    11 May 2018 07:01  -  12 May 2018 07:00  --------------------------------------------------------  IN: 600 mL / OUT: 1500 mL / NET: -900 mL        PHYSICAL EXAM:  Appearance: NAD, no accessory muscle use. With trach and peg. Can respond with nodding/head movement.  HEENT: PERRL.  Conjunctiva clear b/l. Moist oral mucosa.  Cardiovascular: Noted to be regular this AM. S1, S2 with 2/6 systolic murmur along LLSB.   Respiratory: Lungs with rhonchi b/l.   Gastrointestinal:  Soft, nontender. Mild distension with peg in place. 	  Extremities: 2+ edema b/l up to knees and noted dependent edema. VEnous stasis changes. No erythema b/l. LE WWP b/l.  Vascular: DP diminished though present.  Neurologic:  More lethargic this AM but arousable. Following commands intermittently. Makes eye contact.   	  LABS:             LABS:                        8.8    11.7  )-----------( 161      ( 13 May 2018 06:36 )             29.3     05-13    137  |  94<L>  |  58<H>  ----------------------------<  182<H>  5.1   |  26  |  3.38<H>    Ca    8.9      13 May 2018 06:36  Phos  4.9     05-12  Mg     2.2     05-13      PT/INR - ( 13 May 2018 06:36 )   PT: 26.9 sec;   INR: 2.38          PTT - ( 13 May 2018 06:36 )  PTT:40.5 sec       RADIOLOGY & ADDITIONAL TESTS:  Reviewed .    MEDICATIONS  (STANDING):  aspirin  chewable 81 milliGRAM(s) Oral daily  atorvastatin 80 milliGRAM(s) Oral at bedtime  chlorhexidine 0.12% Liquid 15 milliLiter(s) Swish and Spit two times a day  chlorhexidine 2% Cloths 1 Application(s) Topical daily  dextrose 5%. 1000 milliLiter(s) (50 mL/Hr) IV Continuous <Continuous>  dextrose 50% Injectable 12.5 Gram(s) IV Push once  dextrose 50% Injectable 25 Gram(s) IV Push once  dextrose 50% Injectable 25 Gram(s) IV Push once  ergocalciferol Drops 6000 Unit(s) Oral daily  escitalopram 5 milliGRAM(s) Oral daily  folic acid 1 milliGRAM(s) Oral daily  insulin lispro Injectable (HumaLOG) 2 Unit(s) SubCutaneous every 6 hours  insulin regular  human corrective regimen sliding scale   SubCutaneous every 6 hours  levETIRAcetam  Solution 500 milliGRAM(s) Oral every 24 hours  metoclopramide 5 milliGRAM(s) Oral every 6 hours  midodrine 15 milliGRAM(s) Oral every 8 hours  multivitamin 1 Tablet(s) Oral daily  pantoprazole   Suspension 40 milliGRAM(s) Oral daily  predniSONE   Tablet 7.5 milliGRAM(s) Oral two times a day  silver sulfADIAZINE 1% Cream 1 Application(s) Topical daily  thiamine 100 milliGRAM(s) Oral daily    MEDICATIONS  (PRN):  acetaminophen    Suspension. 650 milliGRAM(s) Oral every 6 hours PRN Moderate Pain (4 - 6)  acetaminophen  Suppository 650 milliGRAM(s) Rectal every 6 hours PRN For Temp greater than 38 C (100.4 F)  dextrose Gel 1 Dose(s) Oral once PRN Blood Glucose LESS THAN 70 milliGRAM(s)/deciliter  glucagon  Injectable 1 milliGRAM(s) IntraMuscular once PRN Glucose LESS THAN 70 milligrams/deciliter PROGRESS NOTE    CC: septic/cardiogenic shock, ATN on HD, CVA  Overnight Events: No acute overnight events. Pt on ACVC vent overnight. Blood sugars remain elevated overnight.  Interval History: More lethargic this AM, arousable though minimal response to questions.   ROS: As above.    OBJECTIVE  Vitals:  T(C): 36.5 (05-13-18 @ 06:00), Max: 37.3 (05-12-18 @ 09:18)  HR: 82 (05-13-18 @ 05:13) (79 - 108)  BP: 94/65 (05-13-18 @ 05:13) (82/62 - 106/77)  RR: 10 (05-13-18 @ 05:13) (10 - 16)  SpO2: 100% (05-13-18 @ 05:13) (96% - 100%)  Wt(kg): --  Mode: AC/ CMV (Assist Control/ Continuous Mandatory Ventilation)  RR (machine): 10  TV (machine): 500  FiO2: 50  PEEP: 5  ITime: 1  MAP: 8.7  PIP: 19    I/O:    I&O's Summary          PHYSICAL EXAM:  Appearance: NAD, no accessory muscle use. With trach and peg. Can respond with nodding/head movement.  HEENT: PERRL.  Conjunctiva clear b/l. Moist oral mucosa.  Cardiovascular: Noted to be regular this AM. S1, S2 with 2/6 systolic murmur along LLSB.   Respiratory: Lungs with rhonchi b/l.   Gastrointestinal:  Soft, nontender. Mild distension with peg in place. 	  Extremities: 2+ edema b/l up to knees and noted dependent edema. VEnous stasis changes. No erythema b/l. LE WWP b/l.  Vascular: DP diminished though present.  Neurologic:  More lethargic this AM but arousable. Following commands intermittently. Makes eye contact.   	  LABS:             LABS:                        8.8    11.7  )-----------( 161      ( 13 May 2018 06:36 )             29.3     05-13    137  |  94<L>  |  58<H>  ----------------------------<  182<H>  5.1   |  26  |  3.38<H>    Ca    8.9      13 May 2018 06:36  Phos  4.9     05-12  Mg     2.2     05-13      PT/INR - ( 13 May 2018 06:36 )   PT: 26.9 sec;   INR: 2.38          PTT - ( 13 May 2018 06:36 )  PTT:40.5 sec       RADIOLOGY & ADDITIONAL TESTS:  Reviewed .    MEDICATIONS  (STANDING):  aspirin  chewable 81 milliGRAM(s) Oral daily  atorvastatin 80 milliGRAM(s) Oral at bedtime  chlorhexidine 0.12% Liquid 15 milliLiter(s) Swish and Spit two times a day  chlorhexidine 2% Cloths 1 Application(s) Topical daily  dextrose 5%. 1000 milliLiter(s) (50 mL/Hr) IV Continuous <Continuous>  dextrose 50% Injectable 12.5 Gram(s) IV Push once  dextrose 50% Injectable 25 Gram(s) IV Push once  dextrose 50% Injectable 25 Gram(s) IV Push once  ergocalciferol Drops 6000 Unit(s) Oral daily  escitalopram 5 milliGRAM(s) Oral daily  folic acid 1 milliGRAM(s) Oral daily  insulin lispro Injectable (HumaLOG) 2 Unit(s) SubCutaneous every 6 hours  insulin regular  human corrective regimen sliding scale   SubCutaneous every 6 hours  levETIRAcetam  Solution 500 milliGRAM(s) Oral every 24 hours  metoclopramide 5 milliGRAM(s) Oral every 6 hours  midodrine 15 milliGRAM(s) Oral every 8 hours  multivitamin 1 Tablet(s) Oral daily  pantoprazole   Suspension 40 milliGRAM(s) Oral daily  predniSONE   Tablet 7.5 milliGRAM(s) Oral two times a day  silver sulfADIAZINE 1% Cream 1 Application(s) Topical daily  thiamine 100 milliGRAM(s) Oral daily    MEDICATIONS  (PRN):  acetaminophen    Suspension. 650 milliGRAM(s) Oral every 6 hours PRN Moderate Pain (4 - 6)  acetaminophen  Suppository 650 milliGRAM(s) Rectal every 6 hours PRN For Temp greater than 38 C (100.4 F)  dextrose Gel 1 Dose(s) Oral once PRN Blood Glucose LESS THAN 70 milliGRAM(s)/deciliter  glucagon  Injectable 1 milliGRAM(s) IntraMuscular once PRN Glucose LESS THAN 70 milligrams/deciliter

## 2018-05-13 NOTE — PROGRESS NOTE ADULT - PROBLEM SELECTOR PLAN 3
Treated for both septic as well as cardiogenic shock- requiring pressors and inotropes. Subsequently weaned off- On midodrine 15 mg TID. Has intermittently used albumin to tolerate dialysis. Further complicated by continued low pressures- over 24 hours ranging sbp .   - c/w Midodrine 15 mg q8h  - c/w prednisone 15mg BID titrated down to 7.5mg BID   - plan for HD per schedule ; renal recs     #Adrenal Insufficiency  BP have been low with SBP in , with MAP> 60.  - c/w prednisone 15mg BID   - F/u renal recs regarding HD Treated for both septic as well as cardiogenic shock- requiring pressors and inotropes. Subsequently weaned off- On midodrine 15 mg TID. Has intermittently used albumin to tolerate dialysis. SBP Ranging 82//77 over last 24 hours. Last HD on 5/11 (with 1.5L off)  - c/w Midodrine 15 mg q8h  - c/w prednisone 7.5mg BID and continue with titrating as tolerated.  - F/U renal for HD schedule.     #Adrenal Insufficiency  BP have been low with SBP in , with MAP> 60.  - c/w prednisone 7.5mg BID  - F/u renal recs regarding HD Treated for both septic as well as cardiogenic shock- requiring pressors and inotropes. Subsequently weaned off- On midodrine 15 mg TID. Has intermittently used albumin to tolerate dialysis. SBP Ranging 82//77 over last 24 hours. Last HD on 5/11 (with 1.5L off)  - c/w Midodrine 15 mg q8h  - c/w prednisone 7.5mg BID and continue with titrating as tolerated.  - F/U renal for HD schedule. Last HD took off 1.5L on 5/11    #Adrenal Insufficiency  BP have been low with SBP in , with MAP> 60.  - c/w prednisone 7.5mg BID  - F/u renal recs regarding HD

## 2018-05-13 NOTE — PROGRESS NOTE ADULT - PROBLEM SELECTOR PLAN 6
Hx of Afib and LV thrombus on coumadin prior to admission. Most recent TTE with [facility] shows no LV thrombus currently.   - HR has remained stable, Lopressor has been held in setting of hypotension and plan for Dig for rate control if RVR- goal <110.  - INR 2.82 this AM (therapeutic), dosed 1mg coumadin for tonight instead of 2.5mg    #LV thrombus  LV thrombus noted on RUKHSANA on 4/10 for which patient has been on hep gtt. C/w bridge. Hx of Afib and LV thrombus on coumadin prior to admission. Most recent TTE with [facility] shows no LV thrombus currently.   - HR has remained stable, Lopressor has been held in setting of hypotension and plan for Dig for rate control if RVR- goal <110.  - INR 2.32 this AM (therapeutic) s/p 1mg yesterday, dosed 2mg coumadin for this PM.    #LV thrombus  LV thrombus noted on RUKHSANA on 4/10. Remains therapeutic on warfarin.

## 2018-05-13 NOTE — PROGRESS NOTE ADULT - PROBLEM SELECTOR PLAN 1
Patient is a 63 year old male with GILMER on CKD requiring dialysis. Patient was last dialyzed 5/11 with UF of 1.5L     P - Patient does not require emergent dialysis as electrolytes are stable   Anticipate next dialysis on Monday 5/14 for UF and clearance

## 2018-05-13 NOTE — PROGRESS NOTE ADULT - PROBLEM SELECTOR PLAN 10
VTE: - Full A/C  GI PPx: PPI    DNR- Made DNR, family wants escalation of care, pressors if needed.  F: No IVF in setting of HD.  E: Replete electrolytes with caution in setting of HD.   N: Glucerna 1.5 at 50cc/hr but given worsening episodes of hypoglycemia- will switch back to Jevity.    Dispo: Initially admitted to MICU but now on 7LACH for further management of hypotension in setting of shock, acute encephalopathy, bacteremia and acute on chronic respiratory failure. Course complicated by Septic shock 2/2 to aspiration PNA for which patient now off levophed. Now on 7LACH for further management of aspiraton PNA, chronic respiratory failure, hypotension and poor glucose control. DNR with palliative care. VTE: Coumadin  GI PPx: PPI    DNR- Made DNR, family wants escalation of care, pressors if needed.  F: No IVF in setting of HD.  E: Replete electrolytes with caution in setting of HD.   N: Jevity 1.5 50cc/hr    Dispo: Initially admitted to MICU but now on 7LACH for further management of hypotension in setting of shock, acute encephalopathy, bacteremia and acute on chronic respiratory failure. Course complicated by Septic shock 2/2 to aspiration PNA for which patient remains off levophed. 7LACH for further management of chronic respiratory failure undergoing weaning as tolerated.

## 2018-05-13 NOTE — PROGRESS NOTE ADULT - SUBJECTIVE AND OBJECTIVE BOX
Patient seen and examined at bedside.  Patient is a 63 year old male with a history of HFrEF 10-15% due to ischemic cardiomyopathy, s/p AICD, ?atrial fibrillation, hypertension, diabetes mellitus type 2, gout, and CKD for whom nephrology was called for GILMER from ATN requiring hemodialysis. Patient appears to be clinically unchanged. Patient was last dialyzed 5/11 with UF of 1.5 kg     acetaminophen    Suspension. 650 milliGRAM(s) every 6 hours PRN  acetaminophen  Suppository 650 milliGRAM(s) every 6 hours PRN  aspirin  chewable 81 milliGRAM(s) daily  atorvastatin 80 milliGRAM(s) at bedtime  chlorhexidine 0.12% Liquid 15 milliLiter(s) two times a day  chlorhexidine 2% Cloths 1 Application(s) daily  dextrose 5%. 1000 milliLiter(s) <Continuous>  dextrose 50% Injectable 12.5 Gram(s) once  dextrose 50% Injectable 25 Gram(s) once  dextrose 50% Injectable 25 Gram(s) once  dextrose Gel 1 Dose(s) once PRN  ergocalciferol Drops 6000 Unit(s) daily  escitalopram 5 milliGRAM(s) daily  folic acid 1 milliGRAM(s) daily  glucagon  Injectable 1 milliGRAM(s) once PRN  insulin lispro Injectable (HumaLOG) 2 Unit(s) every 6 hours  insulin regular  human corrective regimen sliding scale   every 6 hours  levETIRAcetam  Solution 500 milliGRAM(s) every 24 hours  metoclopramide 5 milliGRAM(s) every 6 hours  midodrine 15 milliGRAM(s) every 8 hours  multivitamin 1 Tablet(s) daily  pantoprazole   Suspension 40 milliGRAM(s) daily  predniSONE   Tablet 7.5 milliGRAM(s) two times a day  silver sulfADIAZINE 1% Cream 1 Application(s) daily  thiamine 100 milliGRAM(s) daily  warfarin 2 milliGRAM(s) once    Allergies    No Known Allergies    Intolerances    T(C): , Max: 37.2 (05-12-18 @ 18:45)  T(F): , Max: 98.9 (05-12-18 @ 18:45)  HR: 92 (05-13-18 @ 10:10)  BP: 87/59 (05-13-18 @ 08:15)  BP(mean): 68 (05-13-18 @ 08:15)  RR: 20 (05-13-18 @ 10:10)  SpO2: 100% (05-13-18 @ 10:10)    LABS:                        8.8    11.7  )-----------( 161      ( 13 May 2018 06:36 )             29.3     05-13    137  |  94<L>  |  58<H>  ----------------------------<  182<H>  5.1   |  26  |  3.38<H>    Ca    8.9      13 May 2018 06:36  Phos  4.9     05-12  Mg     2.2     05-13    PT/INR - ( 13 May 2018 06:36 )   PT: 26.9 sec;   INR: 2.38       PTT - ( 13 May 2018 06:36 )  PTT:40.5 sec

## 2018-05-13 NOTE — PROGRESS NOTE ADULT - PROBLEM SELECTOR PLAN 1
S/p tracheostomy 4/5. Previous bedside ultrasound notable for b/l pleural effusions and atelectatic lung-Likely related to fluid overload. If clinically worsening, can consider thoracentesis for fluid studies and cultures. Course further complicated by septic shock from aspiration PNA- Completed Zosyn course.  -c/w weaning as tolerated. CPAP with trach collar trials. Acute respiratory failure in setting of septic and cardiogenic shock, as well as AMS for concern for CVA vs toxic metabolic encephalopathy. With trach (4/5/18) for persistent, now chronic respiratory failure. Noted to be overloaded on previous exam/ bedside ultrasound- in setting of ESRD, significantly reduced EF. Noted R side pleural effusion for which pulm consulted.   -c/w weaning as tolerated. CPAP with trach collar trials.  - F/u pulmonary recs for R side effusion- no intervention at this time.

## 2018-05-13 NOTE — PROGRESS NOTE ADULT - ATTENDING COMMENTS
Tolerating Trach collar with CPAP 1 hour q 3 this AM. Place on A/c overnight. Placement in vent and dialysis center will be required.

## 2018-05-13 NOTE — PROGRESS NOTE ADULT - PROBLEM SELECTOR PLAN 4
Noted to have change in mental status- encephalopathy likely multifactorial- component of infectious (Septic/cardiogenic shock) as well as complication for brain stem infarct. Improved this AM, more alert  -MRI head (3/26) significant for several old post circulation infarcts and possible new area of brainstem infarct. Per neurology may have had ischemic event from hypoperfusion. Also showing disc protrusion at C3/C4 level coursing superiorly to the C2/C3 contacting the ventral spinal cord.   -Neurology was previously consulted and was following and contributed to noted new weakness/mental status changes.   -Exam continues to improve. Noted improving lethargy, more awake, responding to questions, following commands. Moving all extremities spontaneously though contracted L arm.   -OT consulted and planned to work with patient today.  - c/w Modafinil  - For complication of seizures- c/w keppra 500 mg qd with extra 250 mg post HD days Noted to have change in mental status- encephalopathy likely multifactorial- component of infectious (Septic/cardiogenic shock) as well as complication for brain stem infarct. Continues to improve- though more lethargic this AM.   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 Per neurology may have had ischemic event from hypoperfusion. Also showing disc protrusion at C3/C4 level coursing superiorly to the C2/C3 contacting the ventral spinal cord.   -Neurology was previously consulted and was following and contributed to noted new weakness/mental status changes.   -More lethargic on exam this AM. Continues to follow commands, answer questions and moving b/l extremities. Contracted LUE and R torticollis- for which working with OT.   - c/w Modafinil  - For complication of seizures- c/w keppra 500 mg qd with extra 250 mg post HD days

## 2018-05-14 LAB
ANION GAP SERPL CALC-SCNC: 20 MMOL/L — HIGH (ref 5–17)
BASOPHILS NFR BLD AUTO: 0.1 % — SIGNIFICANT CHANGE UP (ref 0–2)
BUN SERPL-MCNC: 72 MG/DL — HIGH (ref 7–23)
CALCIUM SERPL-MCNC: 9 MG/DL — SIGNIFICANT CHANGE UP (ref 8.4–10.5)
CHLORIDE SERPL-SCNC: 92 MMOL/L — LOW (ref 96–108)
CO2 SERPL-SCNC: 25 MMOL/L — SIGNIFICANT CHANGE UP (ref 22–31)
CREAT SERPL-MCNC: 3.82 MG/DL — HIGH (ref 0.5–1.3)
EOSINOPHIL NFR BLD AUTO: 0.2 % — SIGNIFICANT CHANGE UP (ref 0–6)
GLUCOSE BLDC GLUCOMTR-MCNC: 180 MG/DL — HIGH (ref 70–99)
GLUCOSE BLDC GLUCOMTR-MCNC: 197 MG/DL — HIGH (ref 70–99)
GLUCOSE BLDC GLUCOMTR-MCNC: 217 MG/DL — HIGH (ref 70–99)
GLUCOSE BLDC GLUCOMTR-MCNC: 221 MG/DL — HIGH (ref 70–99)
GLUCOSE BLDC GLUCOMTR-MCNC: 243 MG/DL — HIGH (ref 70–99)
GLUCOSE BLDC GLUCOMTR-MCNC: 278 MG/DL — HIGH (ref 70–99)
GLUCOSE BLDC GLUCOMTR-MCNC: 296 MG/DL — HIGH (ref 70–99)
GLUCOSE SERPL-MCNC: 222 MG/DL — HIGH (ref 70–99)
HCT VFR BLD CALC: 31.5 % — LOW (ref 39–50)
HGB BLD-MCNC: 9.6 G/DL — LOW (ref 13–17)
INR BLD: 2.34 — HIGH (ref 0.88–1.16)
LYMPHOCYTES # BLD AUTO: 5.1 % — LOW (ref 13–44)
MAGNESIUM SERPL-MCNC: 2.2 MG/DL — SIGNIFICANT CHANGE UP (ref 1.6–2.6)
MCHC RBC-ENTMCNC: 27.9 PG — SIGNIFICANT CHANGE UP (ref 27–34)
MCHC RBC-ENTMCNC: 30.5 G/DL — LOW (ref 32–36)
MCV RBC AUTO: 91.6 FL — SIGNIFICANT CHANGE UP (ref 80–100)
MONOCYTES NFR BLD AUTO: 7.6 % — SIGNIFICANT CHANGE UP (ref 2–14)
NEUTROPHILS NFR BLD AUTO: 87 % — HIGH (ref 43–77)
PHOSPHATE SERPL-MCNC: 6.4 MG/DL — HIGH (ref 2.5–4.5)
PLATELET # BLD AUTO: 207 K/UL — SIGNIFICANT CHANGE UP (ref 150–400)
POTASSIUM SERPL-MCNC: 5.6 MMOL/L — HIGH (ref 3.5–5.3)
POTASSIUM SERPL-SCNC: 5.6 MMOL/L — HIGH (ref 3.5–5.3)
PROTHROM AB SERPL-ACNC: 26.4 SEC — HIGH (ref 9.8–12.7)
RBC # BLD: 3.44 M/UL — LOW (ref 4.2–5.8)
RBC # FLD: 17.6 % — HIGH (ref 10.3–16.9)
SODIUM SERPL-SCNC: 137 MMOL/L — SIGNIFICANT CHANGE UP (ref 135–145)
WBC # BLD: 14.5 K/UL — HIGH (ref 3.8–10.5)
WBC # FLD AUTO: 14.5 K/UL — HIGH (ref 3.8–10.5)

## 2018-05-14 PROCEDURE — 90937 HEMODIALYSIS REPEATED EVAL: CPT | Mod: GC

## 2018-05-14 PROCEDURE — 99233 SBSQ HOSP IP/OBS HIGH 50: CPT | Mod: GC

## 2018-05-14 PROCEDURE — 71045 X-RAY EXAM CHEST 1 VIEW: CPT | Mod: 26

## 2018-05-14 RX ORDER — ALBUMIN HUMAN 25 %
50 VIAL (ML) INTRAVENOUS
Qty: 0 | Refills: 0 | Status: COMPLETED | OUTPATIENT
Start: 2018-05-14 | End: 2018-05-14

## 2018-05-14 RX ORDER — INSULIN LISPRO 100/ML
4 VIAL (ML) SUBCUTANEOUS EVERY 6 HOURS
Qty: 0 | Refills: 0 | Status: DISCONTINUED | OUTPATIENT
Start: 2018-05-15 | End: 2018-05-15

## 2018-05-14 RX ORDER — ERYTHROPOIETIN 10000 [IU]/ML
10000 INJECTION, SOLUTION INTRAVENOUS; SUBCUTANEOUS ONCE
Qty: 0 | Refills: 0 | Status: COMPLETED | OUTPATIENT
Start: 2018-05-14 | End: 2018-05-14

## 2018-05-14 RX ORDER — WARFARIN SODIUM 2.5 MG/1
2 TABLET ORAL ONCE
Qty: 0 | Refills: 0 | Status: COMPLETED | OUTPATIENT
Start: 2018-05-14 | End: 2018-05-14

## 2018-05-14 RX ORDER — HYDROCORTISONE 20 MG
5 TABLET ORAL EVERY 12 HOURS
Qty: 0 | Refills: 0 | Status: DISCONTINUED | OUTPATIENT
Start: 2018-05-14 | End: 2018-05-15

## 2018-05-14 RX ORDER — DOXERCALCIFEROL 2.5 UG/1
2 CAPSULE ORAL ONCE
Qty: 0 | Refills: 0 | Status: COMPLETED | OUTPATIENT
Start: 2018-05-14 | End: 2018-05-14

## 2018-05-14 RX ADMIN — Medication 50 MILLILITER(S): at 11:00

## 2018-05-14 RX ADMIN — Medication 2 UNIT(S): at 13:55

## 2018-05-14 RX ADMIN — MIDODRINE HYDROCHLORIDE 15 MILLIGRAM(S): 2.5 TABLET ORAL at 06:00

## 2018-05-14 RX ADMIN — Medication 5 MILLIGRAM(S): at 15:00

## 2018-05-14 RX ADMIN — Medication 2 UNIT(S): at 07:08

## 2018-05-14 RX ADMIN — INSULIN HUMAN 4: 100 INJECTION, SOLUTION SUBCUTANEOUS at 23:21

## 2018-05-14 RX ADMIN — ATORVASTATIN CALCIUM 80 MILLIGRAM(S): 80 TABLET, FILM COATED ORAL at 22:20

## 2018-05-14 RX ADMIN — ERYTHROPOIETIN 10000 UNIT(S): 10000 INJECTION, SOLUTION INTRAVENOUS; SUBCUTANEOUS at 13:04

## 2018-05-14 RX ADMIN — Medication 2 UNIT(S): at 00:51

## 2018-05-14 RX ADMIN — Medication 1 TABLET(S): at 15:02

## 2018-05-14 RX ADMIN — INSULIN HUMAN 4: 100 INJECTION, SOLUTION SUBCUTANEOUS at 07:09

## 2018-05-14 RX ADMIN — Medication 50 MILLILITER(S): at 13:02

## 2018-05-14 RX ADMIN — CHLORHEXIDINE GLUCONATE 15 MILLILITER(S): 213 SOLUTION TOPICAL at 09:19

## 2018-05-14 RX ADMIN — Medication 100 MILLIGRAM(S): at 15:02

## 2018-05-14 RX ADMIN — Medication 5 MILLIGRAM(S): at 22:20

## 2018-05-14 RX ADMIN — CHLORHEXIDINE GLUCONATE 1 APPLICATION(S): 213 SOLUTION TOPICAL at 06:00

## 2018-05-14 RX ADMIN — Medication 7.5 MILLIGRAM(S): at 06:01

## 2018-05-14 RX ADMIN — Medication 2 UNIT(S): at 18:10

## 2018-05-14 RX ADMIN — WARFARIN SODIUM 2 MILLIGRAM(S): 2.5 TABLET ORAL at 22:20

## 2018-05-14 RX ADMIN — LEVETIRACETAM 500 MILLIGRAM(S): 250 TABLET, FILM COATED ORAL at 15:02

## 2018-05-14 RX ADMIN — Medication 4 UNIT(S): at 23:20

## 2018-05-14 RX ADMIN — PANTOPRAZOLE SODIUM 40 MILLIGRAM(S): 20 TABLET, DELAYED RELEASE ORAL at 15:01

## 2018-05-14 RX ADMIN — MIDODRINE HYDROCHLORIDE 15 MILLIGRAM(S): 2.5 TABLET ORAL at 15:00

## 2018-05-14 RX ADMIN — ESCITALOPRAM OXALATE 5 MILLIGRAM(S): 10 TABLET, FILM COATED ORAL at 15:01

## 2018-05-14 RX ADMIN — Medication 1 APPLICATION(S): at 15:03

## 2018-05-14 RX ADMIN — CHLORHEXIDINE GLUCONATE 15 MILLILITER(S): 213 SOLUTION TOPICAL at 22:20

## 2018-05-14 RX ADMIN — Medication 50 MILLILITER(S): at 11:59

## 2018-05-14 RX ADMIN — INSULIN HUMAN 2: 100 INJECTION, SOLUTION SUBCUTANEOUS at 13:56

## 2018-05-14 RX ADMIN — DOXERCALCIFEROL 2 MICROGRAM(S): 2.5 CAPSULE ORAL at 13:04

## 2018-05-14 RX ADMIN — Medication 81 MILLIGRAM(S): at 15:02

## 2018-05-14 RX ADMIN — Medication 5 MILLIGRAM(S): at 09:19

## 2018-05-14 RX ADMIN — Medication 5 MILLIGRAM(S): at 02:48

## 2018-05-14 RX ADMIN — Medication 1 MILLIGRAM(S): at 15:02

## 2018-05-14 RX ADMIN — INSULIN HUMAN 6: 100 INJECTION, SOLUTION SUBCUTANEOUS at 18:10

## 2018-05-14 RX ADMIN — MIDODRINE HYDROCHLORIDE 15 MILLIGRAM(S): 2.5 TABLET ORAL at 23:20

## 2018-05-14 RX ADMIN — INSULIN HUMAN 2: 100 INJECTION, SOLUTION SUBCUTANEOUS at 00:51

## 2018-05-14 RX ADMIN — ERGOCALCIFEROL 6000 UNIT(S): 1.25 CAPSULE ORAL at 15:02

## 2018-05-14 NOTE — PROGRESS NOTE ADULT - SUBJECTIVE AND OBJECTIVE BOX
Patient is a 63y Male seen and evaluated at bedside. Patient lying in bed in no acute distress on ventilatory support through tracheostomy. Last HD on 5/11 with 1 to 1.5 kg UF. Tolerated procedure. Volume status and chest xray wtih persistent volume overload state. Blood pressure improving at present.      acetaminophen    Suspension. 650 every 6 hours PRN  acetaminophen  Suppository 650 every 6 hours PRN  albumin human 25% IVPB 50 every 1 hour  aspirin  chewable 81 daily  atorvastatin 80 at bedtime  chlorhexidine 0.12% Liquid 15 two times a day  chlorhexidine 2% Cloths 1 daily  dextrose 5%. 1000 <Continuous>  dextrose 50% Injectable 12.5 once  dextrose 50% Injectable 25 once  dextrose 50% Injectable 25 once  dextrose Gel 1 once PRN  doxercalciferol Injectable 2 once  epoetin amy Injectable 66387 once  ergocalciferol Drops 6000 daily  escitalopram 5 daily  folic acid 1 daily  glucagon  Injectable 1 once PRN  insulin lispro Injectable (HumaLOG) 2 every 6 hours  insulin regular  human corrective regimen sliding scale  every 6 hours  levETIRAcetam  Solution 500 every 24 hours  metoclopramide 5 every 6 hours  midodrine 15 every 8 hours  multivitamin 1 daily  pantoprazole   Suspension 40 daily  predniSONE   Tablet 7.5 two times a day  silver sulfADIAZINE 1% Cream 1 daily  thiamine 100 daily      Allergies    No Known Allergies    Intolerances        T(C): , Max: 37.1 (05-14-18 @ 09:04)  T(F): , Max: 98.8 (05-14-18 @ 09:04)  HR: 108 (05-14-18 @ 09:27)  BP: 122/78 (05-14-18 @ 08:12)  BP(mean): 89 (05-14-18 @ 08:12)  RR: 16 (05-14-18 @ 08:12)  SpO2: 99% (05-14-18 @ 09:27)  Wt(kg): --    05-14 @ 07:01  -  05-14 @ 10:36  --------------------------------------------------------  IN: 300 mL / OUT: 0 mL / NET: 300 mL          Review of Systems:  Limited. patinet remain on ventilatory support through tracheostomy.    PHYSICAL EXAM:  GENERAL:Ill appearing, awake but confused, disoriented in no acute distress at present  HEAD:  Atraumatic, Normocephalic,   EYES: Bilateral conjuctiva and sclera normal   Oral cavity: Oral mucosa moistand pale  NECK: Neck supple, No JVD  CHEST/LUNG:  Bilateral decreased breath sounds, Bibasilar rales and crepitatiosn+nt, no wheezing  HEART: Regular rate and rhythm. HOMER II/VI at LPSB, No gallop, no rub   ABDOMEN: Soft, Nontender, nondistended, PEG tube present. BS+nt, No flank tenderness.   EXTREMITIES: Bilateral thigh and leg edema++nt, No clubbing, cyanosis  Neurology: AAOx1, awake but confused, unable to follow verbal commands  SKIN: No rashes or lesions    ACCESS: Right chest wall tunnel HD catheter with exit site with mild fibrotic changes. No active pus discharge. no bleeding    LABS:                        9.6    14.5  )-----------( 207      ( 14 May 2018 06:19 )             31.5     05-14    137  |  92<L>  |  72<H>  ----------------------------<  222<H>  5.6<H>   |  25  |  3.82<H>    Ca    9.0      14 May 2018 06:18  Phos  6.4     05-14  Mg     2.2     05-14        PT/INR - ( 14 May 2018 06:19 )   PT: 26.4 sec;   INR: 2.34          PTT - ( 13 May 2018 06:36 )  PTT:40.5 sec          RADIOLOGY & ADDITIONAL STUDIES:

## 2018-05-14 NOTE — PROGRESS NOTE ADULT - PROBLEM SELECTOR PLAN 6
Hx of Afib and LV thrombus on coumadin prior to admission. Most recent TTE with [facility] shows no LV thrombus currently.   - HR has remained stable, Lopressor has been held in setting of hypotension and plan for Dig for rate control if RVR- goal <110.  - INR 2.34    #LV thrombus  LV thrombus noted on RUKHSANA on 4/10. Remains therapeutic on warfarin. Hx of Afib and LV thrombus on coumadin prior to admission. Most recent TTE with [facility] shows no LV thrombus currently.   - HR has remained stable, Lopressor has been held in setting of hypotension and plan for Dig for rate control if RVR- goal <110.  - INR 2.34, dosed again for 2mg coumadin for tonight. INR goal 2-3    #LV thrombus  LV thrombus noted on RUKHSANA on 4/10. Remains therapeutic on warfarin.  -INR at 2.34, Redosed for 2mg coumadin with INR goal 2-3.

## 2018-05-14 NOTE — PROGRESS NOTE ADULT - PROBLEM SELECTOR PLAN 9
CHF with EF 10-15% 2/2 ischemic cardiomyopathy s/p AICD. Elevated BNP on admission with R sided effusion and edema. Patient with persistent pleural effusions on CXR and US and b/l dependent edema.   - c/w holding ACE/BB in setting of sepsis/ hypotension on midodrine, no UOP therefore no lasix/diuresis- HD for fluid balance.    #CAD- Hx of MI and ischemic CM s/p AICD, STEMI 7/2017, was started on Aspirin and Brilinta.   - c/w aspirin 81 and Atorvastatin 80mg qhs. CHF with EF 10-15% 2/2 ischemic cardiomyopathy s/p AICD. Elevated BNP on admission with R sided effusion and edema. Patient with persistent pleural effusions on CXR and US and b/l dependent edema.   -ACE/BB has been held in setting of sepsis/ hypotension. Remains on midodrine, no UOP therefore no lasix/diuresis- HD for fluid balance.     #CAD- Hx of MI and ischemic CM s/p AICD, STEMI 7/2017, was started on Aspirin and Brilinta.   - c/w aspirin 81 and Atorvastatin 80mg qhs.

## 2018-05-14 NOTE — PROGRESS NOTE ADULT - PROBLEM SELECTOR PLAN 2
DM2 on Januvia at home. HbA1C 8.6. Has been on Lantus 42U qHS, insulin 7 units q6h. Difficulty controlling glucose levels with multiple episodes of hypoglycemia and hyperglycemia. DM2 on Januvia at home. HbA1C 8.6. Has been on Lantus 42U qHS, insulin 7 units q6h. Difficulty controlling glucose levels with multiple episodes of hypoglycemia and hyperglycemia.  - Was tolerating 2U TID lispro, increasing requirements therefore will readjust based on requirements.

## 2018-05-14 NOTE — PROGRESS NOTE ADULT - PROBLEM SELECTOR PLAN 3
Patient's calcium 9.0 and phosphorus 6.4  Continue Hectorol next during dialysis   Low k/Low phos feeding

## 2018-05-14 NOTE — PROGRESS NOTE ADULT - ATTENDING COMMENTS
seen on HD, agree with above   tolerating rx well so far  cont rx as above-- UF as tolerated --albumin PRN

## 2018-05-14 NOTE — PROGRESS NOTE ADULT - PROBLEM SELECTOR PLAN 10
VTE: Coumadin  GI PPx: PPI    DNR- Made DNR, family wants escalation of care, pressors if needed.  F: No IVF in setting of HD.  E: Replete electrolytes with caution in setting of HD.   N: Jevity 1.5 50cc/hr    Dispo: Initially admitted to MICU but now on 7LACH for further management of hypotension in setting of shock, acute encephalopathy, bacteremia and acute on chronic respiratory failure. Course complicated by Septic shock 2/2 to aspiration PNA for which patient remains off levophed. 7LACH for further management of chronic respiratory failure undergoing weaning as tolerated. VTE: Coumadin 2mg (INR 2.34)    GI PPx: PPI    DNR- Made DNR, family wants escalation of care, pressors if needed.  F: No IVF in setting of HD.  E: Replete electrolytes with caution in setting of HD.   N: Jevity 1.5 50cc/hr    Dispo: Initially admitted to MICU but now on 7LACH for further management of hypotension in setting of shock, acute encephalopathy, bacteremia and acute on chronic respiratory failure. Course complicated by Septic shock 2/2 to aspiration PNA for which patient remains off levophed. 7LACH for further management of chronic respiratory failure undergoing weaning as tolerated.

## 2018-05-14 NOTE — PROGRESS NOTE ADULT - PROBLEM SELECTOR PLAN 4
Noted to have change in mental status- encephalopathy likely multifactorial- component of infectious (Septic/cardiogenic shock) as well as complication for brain stem infarct. Continues to improve- though more lethargic this AM.   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 Per neurology may have had ischemic event from hypoperfusion. Also showing disc protrusion at C3/C4 level coursing superiorly to the C2/C3 contacting the ventral spinal cord.   -Neurology was previously consulted and was following and contributed to noted new weakness/mental status changes.   -Remains lethargic, not following commands or making eye contact. Patient moving b/l LE and RUE>LUE. Contracted LUE and R torticollis- OT has evaluated and working with patient.  - c/w Modafinil  - For complication of seizures- c/w keppra 500 mg qd with extra 250 mg post HD days Noted to have change in mental status- encephalopathy likely multifactorial- component of infectious (Septic/cardiogenic shock) as well as complication for brain stem infarct. Continues to improve- though more lethargic this AM.   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 Per neurology may have had ischemic event from hypoperfusion. Also showing disc protrusion at C3/C4 level coursing superiorly to the C2/C3 contacting the ventral spinal cord.   -Neurology was previously consulted and was following and contributed to noted new weakness/mental status changes.   -Noted to be lethargic this AM not following commands or making eye contact. Patient moving b/l LE and RUE>LUE. Contracted LUE and R torticollis- OT has evaluated and working with patient. S/p HD noted significantly improving mental status, more awake and following commands this afternoon.  - c/w Modafinil  - c/w keppra 500 mg qd with extra 250 mg post HD days for seizures

## 2018-05-14 NOTE — CHART NOTE - NSCHARTNOTEFT_GEN_A_CORE
Admitting Diagnosis:   63M PMH HFrEF 10-15% (ischemic), MI, s/p AICD vs PPM, possible Afib, HTN, DM2 on insulin, possible CKD, and gout, who presents with a chief complaint of generalized weakness s/p septic shock likely 2/2 LE cellulitis. AMS and new leukocytisis likely 2/2 UTI. Trach and PEG dependent. Continues to receive HD TIW.    PAST MEDICAL & SURGICAL HISTORY:  Type 2 diabetes mellitus with diabetic peripheral angiopathy without gangrene, with long-term current use  Essential hypertension, benign  Gout  Pacemaker  Chronic systolic heart failure  Myocardial infarction  No significant past surgical history      Current Nutrition Order:  Jevity 1.5 Terrell @ 50mL/hr x 24hrs plus ProStat Sugar Free BID (200 kcal, 30g protein) via PEG providing in total: 1200mL TV, 2000 kcal, 107g protein, 912 mL free H2O, 120% RDI    PO Intake: Good (%) [   ]  Fair (50-75%) [   ] Poor (<25%) [   ]- N/A NPO with TF    GI Issues: Good tolerance per RN     Pain: Unable to assess at this time 2/2 vent     Skin Integrity:  L buttock stage 2 PU  L calf venous ulcer  Trach stage 3 PU  L. UE eschar       Labs:       137  |  92<L>  |  72<H>  ----------------------------<  222<H>  5.6<H>   |  25  |  3.82<H>    Ca    9.0      14 May 2018 06:18  Phos  6.4     -  Mg     2.2     -      CAPILLARY BLOOD GLUCOSE      POCT Blood Glucose.: 221 mg/dL (14 May 2018 11:00)  POCT Blood Glucose.: 243 mg/dL (14 May 2018 07:06)  POCT Blood Glucose.: 197 mg/dL (14 May 2018 00:04)  POCT Blood Glucose.: 188 mg/dL (13 May 2018 20:03)  POCT Blood Glucose.: 177 mg/dL (13 May 2018 17:00)      Medications:  MEDICATIONS  (STANDING):  aspirin  chewable 81 milliGRAM(s) Oral daily  atorvastatin 80 milliGRAM(s) Oral at bedtime  chlorhexidine 0.12% Liquid 15 milliLiter(s) Swish and Spit two times a day  chlorhexidine 2% Cloths 1 Application(s) Topical daily  dextrose 5%. 1000 milliLiter(s) (50 mL/Hr) IV Continuous <Continuous>  dextrose 50% Injectable 12.5 Gram(s) IV Push once  dextrose 50% Injectable 25 Gram(s) IV Push once  dextrose 50% Injectable 25 Gram(s) IV Push once  ergocalciferol Drops 6000 Unit(s) Oral daily  escitalopram 5 milliGRAM(s) Oral daily  folic acid 1 milliGRAM(s) Oral daily  insulin lispro Injectable (HumaLOG) 2 Unit(s) SubCutaneous every 6 hours  insulin regular  human corrective regimen sliding scale   SubCutaneous every 6 hours  levETIRAcetam  Solution 500 milliGRAM(s) Oral every 24 hours  metoclopramide 5 milliGRAM(s) Oral every 6 hours  midodrine 15 milliGRAM(s) Oral every 8 hours  multivitamin 1 Tablet(s) Oral daily  pantoprazole   Suspension 40 milliGRAM(s) Oral daily  predniSONE   Tablet 7.5 milliGRAM(s) Oral two times a day  silver sulfADIAZINE 1% Cream 1 Application(s) Topical daily  thiamine 100 milliGRAM(s) Oral daily    MEDICATIONS  (PRN):  acetaminophen    Suspension. 650 milliGRAM(s) Oral every 6 hours PRN Moderate Pain (4 - 6)  acetaminophen  Suppository 650 milliGRAM(s) Rectal every 6 hours PRN For Temp greater than 38 C (100.4 F)  dextrose Gel 1 Dose(s) Oral once PRN Blood Glucose LESS THAN 70 milliGRAM(s)/deciliter  glucagon  Injectable 1 milliGRAM(s) IntraMuscular once PRN Glucose LESS THAN 70 milligrams/deciliter      Weight:  Daily     Daily Weight in k.6 (14 May 2018 11:00)    Weight:  86.8kg ()  86.2kg (5/3)  85.1kg ()  79.1kg ()  75.9kg ()  77.4kg ()  72.7kg ()  77.2kg ()  80.9 kg ()  84.5kg (3/31)  86.9kg (3/19)  94.7 kg (3/16)    Weight Change:   Weight fluctuating throughout admission d/t HD, TF inconsistencies    Estimated energy needs using 89 kg IBW; Needs estimated / vent/post-op/PU/HD  Calories: 25-30 kcal/kg = 0787-4282 kcal/day  Protein: 1.4-1.6 g/kg = 125-142 g protein/day  Fluids: per team / HD     Subjective:   S/p trach and PEG placements on . S/p permacath replacement on . Stable on MICU step down at this time. Pt seen in room, asleep, undergoing HD. Trached to vent on VC/AC mode; tolerating CPAP trials over weekend per notes. TF running at current goal rate of 50mL/hr with good tolerance. EN formula was switched to Jevity 1.5 over weekend d/t periods of hypoglycemia and elevated K (higher amounts in Glucerna than Jevity) per MD; though needs not being met at this current rate. BG being monitored and managed closely. , 197mg/dL, Na 137, K 5.6 (H), Mg 2.2, Phos 6.4 (H).     Previous Nutrition Diagnosis:   Increased protein-calorie needs RT increased demand for protein-calorie intake AEB on vent support    Active [X]  Resolved [   ]    New PES statement:     Goal:   Continue to meet % of nutrition needs via tolerated route.     Recommendations:  1. Recommend changing TF order to meet needs: Jevity 1.5 Terrell @ 62mL/hr x 24hrs plus ProStat Sugar Free BID (200 kcal, 30g protein) to provide in total: 1488 mL TV, 2432 kcal, 125g protein, 1131 mL free H2O, 148% RDI, 1.40g/kg IBW protein. Monitor for s/s intolerance; maintain aspiration precautions at all times  2. Continue to trend weights  3 Monitor POC BG  4. Continue to monitor for GOC and keep nutrition in line at all times     Education:   N/A-vent    Risk Level: High [X] Moderate [   ] Low [   ]

## 2018-05-14 NOTE — PROGRESS NOTE ADULT - SUBJECTIVE AND OBJECTIVE BOX
Patient was seen and evaluated on dialysis.   Patient is tolerating the procedure well.   HR: 108 (05-14-18 @ 09:27)  BP: 122/78 (05-14-18 @ 08:12)  Continue dialysis:   Dialyzer: Revaclear 400         QB: 350        QD: 500 2K+  Goal UF 2 to 2.5 Kg over 3.5 Hours

## 2018-05-14 NOTE — PROGRESS NOTE ADULT - PROBLEM SELECTOR PLAN 3
Treated for both septic as well as cardiogenic shock- requiring pressors and inotropes. Subsequently weaned off- On midodrine 15 mg TID. Has intermittently used albumin to tolerate dialysis. SBP Ranging 87//75 over last 24 hours.   - c/w Midodrine 15 mg q8h  - c/w prednisone   - F/U renal for HD    #Adrenal Insufficiency  BP have been low with SBP in , with MAP> 60.  - c/w prednisone   - F/u renal recs regarding HD Treated for both septic as well as cardiogenic shock- requiring pressors and inotropes. Subsequently weaned off- On midodrine 15 mg TID. Has intermittently used albumin to tolerate dialysis. SBP Ranging 87//75 over last 24 hours.   - c/w Midodrine 15 mg q8h  - prednisone switched to hydrocortisone 5mg BID  - F/U renal for HD, took off     #Adrenal Insufficiency  BP have been low with SBP in , with MAP> 60.  - c/w prednisone   - F/u renal recs regarding HD Treated for both septic as well as cardiogenic shock- requiring pressors and inotropes. Subsequently weaned off- On midodrine 15 mg TID. Has intermittently used albumin to tolerate dialysis. SBP Ranging 87//75 over last 24 hours.   - c/w Midodrine 15 mg q8h  - prednisone switched to hydrocortisone 5mg BID  - F/U renal for HD, took off 2.5L today.    #Adrenal Insufficiency  BP have been low with SBP in , with MAP> 60.  - prednisone switched to hydrocortisone 5mg BID  - F/u renal recs regarding HD, took off 2.5L today. Treated for both septic as well as cardiogenic shock- requiring pressors and inotropes. Subsequently weaned off- On midodrine 15 mg TID. Has intermittently used albumin to tolerate dialysis. SBP Ranging 87//75 over last 24 hours.   - c/w Midodrine 15 mg q8h  - prednisone switched to hydrocortisone 5mg BID  - F/U renal for HD, took off 2.5L today. Better tolerating HD, can attempt to wean off albumin.    #Adrenal Insufficiency  BP have been low with SBP in , with MAP> 60.  - prednisone switched to hydrocortisone 5mg BID  - F/u renal recs regarding HD, took off 2.5L today.

## 2018-05-14 NOTE — PROGRESS NOTE ADULT - ASSESSMENT
63M PMH HFrEF 10-15% (ischemic), MI, s/p AICD vs PPM, possible Afib, HTN, DM2 on insulin, possible CKD, and gout, who presented with a chief complaint of generalized weakness (3/10/2018) s/p septic shock likely 2/2 LE cellulitis complicated by ATN requiring dialysis. Course complicated by AMS likely from brainstem infarct 2/2 LV thrombus vs toxic metabolic encephalopathy. Further complicated by development of candidemia and sepsis 2/2 klebsiella bacteremia s/p fluconazole and CTX- For which has since resolved. Completed course of Zosyn for aspiration PNA.   Goals of care discussion for which patient made DNR, but with continued escalation of care. Continues to undergo management for respiratory failure with weaning as tolerated with CPAP, trach collar trials. With complications of hypotension, attempting to wean from albumin for HD.

## 2018-05-14 NOTE — PROGRESS NOTE ADULT - PROBLEM SELECTOR PLAN 5
In setting of septic/cardiogenic shock-renal failure likely 2/2 to ischemic ATN with hypoperfusion. Baseline unknown but presenting Cr 3.93 with associated metabolic derangements. Started on HD during course with renal following. Permacath replaced multiple times over course for bacteremia and fungemia. Now with L subclavian HD cath.    #Elevated AG   uremia. Lactate negative. Glucose improving controlled. Low suspicion for DKA or lactic acidosis. In setting of septic/cardiogenic shock-renal failure likely 2/2 to ischemic ATN with hypoperfusion. Baseline unknown but presenting Cr 3.93 with associated metabolic derangements. Started on HD during course with renal following. Permacath replaced multiple times over course for bacteremia and fungemia. Now with L subclavian HD cath.    #Elevated AG  AG at 20 today- elevation during course attributed to uremia- improving toleration of HD. Took off 2.5L this afternoon. Lactate negative. Glucose improving controlled. Low suspicion for DKA or lactic acidosis. In setting of septic/cardiogenic shock-renal failure likely 2/2 to ischemic ATN with hypoperfusion. Baseline unknown but presenting Cr 3.93 with associated metabolic derangements. Started on HD during course with renal following. Permacath replaced multiple times over course for bacteremia and fungemia. Now with L subclavian HD cath. HD today to remove more fluid.    #Elevated AG  AG at 20 today- elevation during course attributed to uremia- improving toleration of HD. Took off 2.5L this afternoon. Lactate negative. Glucose improving controlled.

## 2018-05-14 NOTE — PROGRESS NOTE ADULT - PROBLEM SELECTOR PLAN 1
Patient is a 63 year old male with GILMER on CKD requiring dialysis.   Patient was last dialyzed 5/11 with UF of 1.5L     P -   Volume status persistently hypervolemic and hyperkalemia K 5.6  Will schedule for HD treatment for UF and clearnace

## 2018-05-14 NOTE — PROGRESS NOTE ADULT - PROBLEM SELECTOR PLAN 8
Resolved. Presented initially with septic shock, has completed course of abx for bacteremia with Klebsiella as well as fungemia. Further complicated by septic shock 2/2 to aspiration with increasing O2 requirements while on 7lACH and transferred back to MICU. Weaned off pressors and back on 7LACH for which patient Completed additional 10 day course of Zosyn.   - continue to monitor    #Fungemia  RESOLVED. Noted to have Candida Tropicalis growing in blood cultures and completed fluconazole course. Resolved. Presented initially with septic shock, has completed course of abx for bacteremia with Klebsiella as well as fungemia. Further complicated by septic shock 2/2 to aspiration with increasing O2 requirements while on 7lACH and transferred back to MICU. Weaned off pressors and back on 7LACH for which patient Completed additional 10 day course of Zosyn.   - continue to monitor for signs and symptoms of infection. WBC     #Fungemia  RESOLVED. Noted to have Candida Tropicalis growing in blood cultures and completed fluconazole course.

## 2018-05-14 NOTE — PROGRESS NOTE ADULT - PROBLEM SELECTOR PLAN 4
Patient with severe systolic CHF with worsening volume status and pleural effusion  Will give albumin and midodrine during HD  Pulmonary follow up for large right pleural effusion   Optimize CHF treatment per cardiology/CHF team

## 2018-05-14 NOTE — PROGRESS NOTE ADULT - PROBLEM SELECTOR PLAN 1
Acute respiratory failure in setting of septic and cardiogenic shock, as well as AMS for concern for CVA vs toxic metabolic encephalopathy. With trach (4/5/18) for persistent, now chronic respiratory failure. Noted to be overloaded on previous exam/ bedside ultrasound- in setting of ESRD, significantly reduced EF. Noted R side pleural effusion for which pulm consulted.   -c/w weaning as tolerated. CPAP with trach collar trials.  - F/u pulmonary recs Acute respiratory failure in setting of septic and cardiogenic shock, as well as AMS for concern for CVA vs toxic metabolic encephalopathy. With trach (4/5/18) for persistent, now chronic respiratory failure. Noted to be overloaded on previous exam/ bedside ultrasound- in setting of ESRD, significantly reduced EF. Noted R side pleural effusion for which pulm consulted. No interventions at this time and undergoing weaning with CPAP and trach collar trials.  -c/w weaning as tolerated. CPAP with trach collar trials.  - F/u pulmonary recs. No intervention at this time.

## 2018-05-14 NOTE — PROGRESS NOTE ADULT - SUBJECTIVE AND OBJECTIVE BOX
Patient was seen and evaluated on dialysis.   Patient is tolerating the procedure well.   HR: 92 (05-14-18 @ 12:30)  BP: 89/67 (05-14-18 @ 12:30)  Continue dialysis:   Dialyzer:  Revaclear 400        QB: 350       QD: 400 2K+  Goal UF 2.5 Kg over 3.5 Hours

## 2018-05-14 NOTE — PROGRESS NOTE ADULT - SUBJECTIVE AND OBJECTIVE BOX
PROGRESS NOTE    CC: Septic, cardiogenic shock, CVA  Overnight Events: Diaphoretic, checked sugar and was 188 and was afebrile, on AC o/n.  Interval History:   ROS:    OBJECTIVE  Vitals:  T(C): 37 (05-14-18 @ 06:00), Max: 37 (05-14-18 @ 06:00)  HR: 96 (05-14-18 @ 05:22) (84 - 110)  BP: 97/76 (05-14-18 @ 04:10) (87/59 - 107/75)  RR: 14 (05-14-18 @ 05:22) (10 - 26)  SpO2: 100% (05-14-18 @ 05:22) (98% - 100%)  Wt(kg): --  Mode: AC/ CMV (Assist Control/ Continuous Mandatory Ventilation)  RR (machine): 10  TV (machine): 500  FiO2: 50  PEEP: 5  ITime: 1  MAP: 10  PIP: 24    I/O:  I&O's Summary      PHYSICAL EXAM:  Appearance: NAD, no accessory muscle use. With trach and peg. Can respond with nodding/head movement.  HEENT: PERRL. Conjunctiva clear b/l. Moist oral mucosa.  Cardiovascular: Irregularly, irregular. S1, S2 with 2/6 systolic murmur along LLSB.   Respiratory: Lungs with rhonchi b/l.   Gastrointestinal:  Soft, nontender. Mild distension with peg in place. 	  Extremities: 2+ edema b/l up to knees and noted dependent edema. Venous stasis changes. No erythema b/l. LE WWP b/l.  Vascular: DP diminished though present.  Neurologic:  More lethargic this AM but arousable. Following commands intermittently. Makes eye contact.   	  LABS:                        8.8    11.7  )-----------( 161      ( 13 May 2018 06:36 )             29.3     05-13    137  |  94<L>  |  58<H>  ----------------------------<  182<H>  5.1   |  26  |  3.38<H>    Ca    8.9      13 May 2018 06:36  Phos  4.9     05-12  Mg     2.2     05-13      PT/INR - ( 13 May 2018 06:36 )   PT: 26.9 sec;   INR: 2.38          PTT - ( 13 May 2018 06:36 )  PTT:40.5 sec      RADIOLOGY & ADDITIONAL TESTS:  Reviewed .    MEDICATIONS  (STANDING):  aspirin  chewable 81 milliGRAM(s) Oral daily  atorvastatin 80 milliGRAM(s) Oral at bedtime  chlorhexidine 0.12% Liquid 15 milliLiter(s) Swish and Spit two times a day  chlorhexidine 2% Cloths 1 Application(s) Topical daily  dextrose 5%. 1000 milliLiter(s) (50 mL/Hr) IV Continuous <Continuous>  dextrose 50% Injectable 12.5 Gram(s) IV Push once  dextrose 50% Injectable 25 Gram(s) IV Push once  dextrose 50% Injectable 25 Gram(s) IV Push once  ergocalciferol Drops 6000 Unit(s) Oral daily  escitalopram 5 milliGRAM(s) Oral daily  folic acid 1 milliGRAM(s) Oral daily  insulin lispro Injectable (HumaLOG) 2 Unit(s) SubCutaneous every 6 hours  insulin regular  human corrective regimen sliding scale   SubCutaneous every 6 hours  levETIRAcetam  Solution 500 milliGRAM(s) Oral every 24 hours  metoclopramide 5 milliGRAM(s) Oral every 6 hours  midodrine 15 milliGRAM(s) Oral every 8 hours  multivitamin 1 Tablet(s) Oral daily  pantoprazole   Suspension 40 milliGRAM(s) Oral daily  predniSONE   Tablet 7.5 milliGRAM(s) Oral two times a day  silver sulfADIAZINE 1% Cream 1 Application(s) Topical daily  thiamine 100 milliGRAM(s) Oral daily    MEDICATIONS  (PRN):  acetaminophen    Suspension. 650 milliGRAM(s) Oral every 6 hours PRN Moderate Pain (4 - 6)  acetaminophen  Suppository 650 milliGRAM(s) Rectal every 6 hours PRN For Temp greater than 38 C (100.4 F)  dextrose Gel 1 Dose(s) Oral once PRN Blood Glucose LESS THAN 70 milliGRAM(s)/deciliter  glucagon  Injectable 1 milliGRAM(s) IntraMuscular once PRN Glucose LESS THAN 70 milligrams/deciliter      ASSESSMENT:    PLAN: PROGRESS NOTE    CC: Septic, cardiogenic shock, CVA  Overnight Events: Diaphoretic, checked sugar and was 188 and was afebrile, on AC o/n.  Interval History:  More lethargic today, unable to answer questions, follow commands .  ROS: As above.    OBJECTIVE  Vitals:  T(C): 37 (05-14-18 @ 06:00), Max: 37 (05-14-18 @ 06:00)  HR: 96 (05-14-18 @ 05:22) (84 - 110)  BP: 97/76 (05-14-18 @ 04:10) (87/59 - 107/75)  RR: 14 (05-14-18 @ 05:22) (10 - 26)  SpO2: 100% (05-14-18 @ 05:22) (98% - 100%)  Wt(kg): --  Mode: AC/ CMV (Assist Control/ Continuous Mandatory Ventilation)  RR (machine): 10  TV (machine): 500  FiO2: 50  PEEP: 5  ITime: 1  MAP: 10  PIP: 24    I/O:  I&O's Summary      PHYSICAL EXAM:  Appearance: NAD, no accessory muscle use. With trach and peg. Increased twitching, less responsive to questions/verbal.  HEENT: PERRL. Conjunctiva clear b/l. Moist oral mucosa.  Cardiovascular: Irregularly, irregular. S1, S2 with 2/6 systolic murmur along LLSB.   Respiratory: Lungs with rhonchi b/l.   Gastrointestinal:  Soft, nontender. Mild distension with peg in place. 	  Extremities: 2+ edema b/l up to knees and noted dependent edema. Venous stasis changes. No erythema b/l. LE WWP b/l.  Vascular: DP diminished though present.  Neurologic:  More lethargic this AM but arousable. Not following commands this AM. Noted more twitching of mouth and hands b/l (though R>L).  	  LABS:                        8.8    11.7  )-----------( 161      ( 13 May 2018 06:36 )             29.3     05-13    137  |  94<L>  |  58<H>  ----------------------------<  182<H>  5.1   |  26  |  3.38<H>    Ca    8.9      13 May 2018 06:36  Phos  4.9     05-12  Mg     2.2     05-13      PT/INR - ( 13 May 2018 06:36 )   PT: 26.9 sec;   INR: 2.38          PTT - ( 13 May 2018 06:36 )  PTT:40.5 sec      RADIOLOGY & ADDITIONAL TESTS:  Reviewed .    MEDICATIONS  (STANDING):  aspirin  chewable 81 milliGRAM(s) Oral daily  atorvastatin 80 milliGRAM(s) Oral at bedtime  chlorhexidine 0.12% Liquid 15 milliLiter(s) Swish and Spit two times a day  chlorhexidine 2% Cloths 1 Application(s) Topical daily  dextrose 5%. 1000 milliLiter(s) (50 mL/Hr) IV Continuous <Continuous>  dextrose 50% Injectable 12.5 Gram(s) IV Push once  dextrose 50% Injectable 25 Gram(s) IV Push once  dextrose 50% Injectable 25 Gram(s) IV Push once  ergocalciferol Drops 6000 Unit(s) Oral daily  escitalopram 5 milliGRAM(s) Oral daily  folic acid 1 milliGRAM(s) Oral daily  insulin lispro Injectable (HumaLOG) 2 Unit(s) SubCutaneous every 6 hours  insulin regular  human corrective regimen sliding scale   SubCutaneous every 6 hours  levETIRAcetam  Solution 500 milliGRAM(s) Oral every 24 hours  metoclopramide 5 milliGRAM(s) Oral every 6 hours  midodrine 15 milliGRAM(s) Oral every 8 hours  multivitamin 1 Tablet(s) Oral daily  pantoprazole   Suspension 40 milliGRAM(s) Oral daily  predniSONE   Tablet 7.5 milliGRAM(s) Oral two times a day  silver sulfADIAZINE 1% Cream 1 Application(s) Topical daily  thiamine 100 milliGRAM(s) Oral daily    MEDICATIONS  (PRN):  acetaminophen    Suspension. 650 milliGRAM(s) Oral every 6 hours PRN Moderate Pain (4 - 6)  acetaminophen  Suppository 650 milliGRAM(s) Rectal every 6 hours PRN For Temp greater than 38 C (100.4 F)  dextrose Gel 1 Dose(s) Oral once PRN Blood Glucose LESS THAN 70 milliGRAM(s)/deciliter  glucagon  Injectable 1 milliGRAM(s) IntraMuscular once PRN Glucose LESS THAN 70 milligrams/deciliter PROGRESS NOTE    CC: Septic, cardiogenic shock, CVA  Overnight Events: Diaphoretic, checked sugar and was 188 and was afebrile, on AC o/n.  Interval History:  More lethargic today, unable to answer questions, follow commands .  ROS: As above.    OBJECTIVE  Vitals:  T(C): 37 (05-14-18 @ 06:00), Max: 37 (05-14-18 @ 06:00)  HR: 96 (05-14-18 @ 05:22) (84 - 110)  BP: 97/76 (05-14-18 @ 04:10) (87/59 - 107/75)  RR: 14 (05-14-18 @ 05:22) (10 - 26)  SpO2: 100% (05-14-18 @ 05:22) (98% - 100%)  Wt(kg): --  Mode: AC/ CMV (Assist Control/ Continuous Mandatory Ventilation)  RR (machine): 10  TV (machine): 500  FiO2: 50  PEEP: 5  ITime: 1  MAP: 10  PIP: 24    I/O:  I&O's Summary      PHYSICAL EXAM:  Appearance: NAD. With trach and peg. Noted facial and hand twitching, less responsive to questions/verbal.  HEENT: PERRL. Conjunctiva clear b/l. Moist oral mucosa.  Cardiovascular: Irregularly, irregular. S1, S2 with 2/6 systolic murmur along LLSB.   Respiratory: Lungs with rhonchi b/l.   Gastrointestinal:  Soft, nontender. Mild distension with peg in place. 	  Extremities: 2+ edema b/l up to knees and noted dependent edema. Venous stasis changes. No erythema b/l. LE WWP b/l.  Vascular: DP diminished though present.  Neurologic:  More lethargic this AM but arousable. Not following commands this AM. Noted more twitching of mouth and hands b/l (though R>L).  	  LABS:                        8.8    11.7  )-----------( 161      ( 13 May 2018 06:36 )             29.3     05-13    137  |  94<L>  |  58<H>  ----------------------------<  182<H>  5.1   |  26  |  3.38<H>    Ca    8.9      13 May 2018 06:36  Phos  4.9     05-12  Mg     2.2     05-13      PT/INR - ( 13 May 2018 06:36 )   PT: 26.9 sec;   INR: 2.38          PTT - ( 13 May 2018 06:36 )  PTT:40.5 sec      RADIOLOGY & ADDITIONAL TESTS:  Reviewed .    MEDICATIONS  (STANDING):  aspirin  chewable 81 milliGRAM(s) Oral daily  atorvastatin 80 milliGRAM(s) Oral at bedtime  chlorhexidine 0.12% Liquid 15 milliLiter(s) Swish and Spit two times a day  chlorhexidine 2% Cloths 1 Application(s) Topical daily  dextrose 5%. 1000 milliLiter(s) (50 mL/Hr) IV Continuous <Continuous>  dextrose 50% Injectable 12.5 Gram(s) IV Push once  dextrose 50% Injectable 25 Gram(s) IV Push once  dextrose 50% Injectable 25 Gram(s) IV Push once  ergocalciferol Drops 6000 Unit(s) Oral daily  escitalopram 5 milliGRAM(s) Oral daily  folic acid 1 milliGRAM(s) Oral daily  insulin lispro Injectable (HumaLOG) 2 Unit(s) SubCutaneous every 6 hours  insulin regular  human corrective regimen sliding scale   SubCutaneous every 6 hours  levETIRAcetam  Solution 500 milliGRAM(s) Oral every 24 hours  metoclopramide 5 milliGRAM(s) Oral every 6 hours  midodrine 15 milliGRAM(s) Oral every 8 hours  multivitamin 1 Tablet(s) Oral daily  pantoprazole   Suspension 40 milliGRAM(s) Oral daily  predniSONE   Tablet 7.5 milliGRAM(s) Oral two times a day  silver sulfADIAZINE 1% Cream 1 Application(s) Topical daily  thiamine 100 milliGRAM(s) Oral daily    MEDICATIONS  (PRN):  acetaminophen    Suspension. 650 milliGRAM(s) Oral every 6 hours PRN Moderate Pain (4 - 6)  acetaminophen  Suppository 650 milliGRAM(s) Rectal every 6 hours PRN For Temp greater than 38 C (100.4 F)  dextrose Gel 1 Dose(s) Oral once PRN Blood Glucose LESS THAN 70 milliGRAM(s)/deciliter  glucagon  Injectable 1 milliGRAM(s) IntraMuscular once PRN Glucose LESS THAN 70 milligrams/deciliter PROGRESS NOTE    CC: Septic, cardiogenic shock, CVA  Overnight Events: Diaphoretic, checked sugar and was 188 and was afebrile, on AC o/n.  Interval History:  More lethargic today, unable to answer questions, follow commands .  ROS: As above.    OBJECTIVE  Vitals:  T(C): 37 (05-14-18 @ 06:00), Max: 37 (05-14-18 @ 06:00)  HR: 96 (05-14-18 @ 05:22) (84 - 110)  BP: 97/76 (05-14-18 @ 04:10) (87/59 - 107/75)  RR: 14 (05-14-18 @ 05:22) (10 - 26)  SpO2: 100% (05-14-18 @ 05:22) (98% - 100%)  Wt(kg): --  Mode: AC/ CMV (Assist Control/ Continuous Mandatory Ventilation)  RR (machine): 10  TV (machine): 500  FiO2: 50  PEEP: 5  ITime: 1  MAP: 10  PIP: 24    I/O:  I&O's Summary      PHYSICAL EXAM:  Appearance: NAD. With trach and peg. Noted facial and hand twitching, less responsive to questions/verbal.  HEENT: PERRL. Conjunctiva clear b/l. Moist oral mucosa.  Cardiovascular: Irregularly, irregular. S1, S2 with 2/6 systolic murmur along LLSB.   Respiratory: Lungs with rhonchi b/l.   Gastrointestinal:  Soft, nontender. Mild distension with peg in place. 	  Extremities: 2+ edema b/l up to knees and noted dependent edema. Venous stasis changes. No erythema b/l. LE WWP b/l.  Vascular: DP diminished though present.  Neurologic:  More lethargic this AM but arousable. Not following commands this AM. Noted more twitching of mouth and hands b/l (though R>L).  Skin: LE stasis changes  	  LABS:                        8.8    11.7  )-----------( 161      ( 13 May 2018 06:36 )             29.3     05-13    137  |  94<L>  |  58<H>  ----------------------------<  182<H>  5.1   |  26  |  3.38<H>    Ca    8.9      13 May 2018 06:36  Phos  4.9     05-12  Mg     2.2     05-13      PT/INR - ( 13 May 2018 06:36 )   PT: 26.9 sec;   INR: 2.38          PTT - ( 13 May 2018 06:36 )  PTT:40.5 sec      RADIOLOGY & ADDITIONAL TESTS:  Reviewed . CXR with increased effusions    MEDICATIONS  (STANDING):  aspirin  chewable 81 milliGRAM(s) Oral daily  atorvastatin 80 milliGRAM(s) Oral at bedtime  chlorhexidine 0.12% Liquid 15 milliLiter(s) Swish and Spit two times a day  chlorhexidine 2% Cloths 1 Application(s) Topical daily  dextrose 5%. 1000 milliLiter(s) (50 mL/Hr) IV Continuous <Continuous>  dextrose 50% Injectable 12.5 Gram(s) IV Push once  dextrose 50% Injectable 25 Gram(s) IV Push once  dextrose 50% Injectable 25 Gram(s) IV Push once  ergocalciferol Drops 6000 Unit(s) Oral daily  escitalopram 5 milliGRAM(s) Oral daily  folic acid 1 milliGRAM(s) Oral daily  insulin lispro Injectable (HumaLOG) 2 Unit(s) SubCutaneous every 6 hours  insulin regular  human corrective regimen sliding scale   SubCutaneous every 6 hours  levETIRAcetam  Solution 500 milliGRAM(s) Oral every 24 hours  metoclopramide 5 milliGRAM(s) Oral every 6 hours  midodrine 15 milliGRAM(s) Oral every 8 hours  multivitamin 1 Tablet(s) Oral daily  pantoprazole   Suspension 40 milliGRAM(s) Oral daily  predniSONE   Tablet 7.5 milliGRAM(s) Oral two times a day  silver sulfADIAZINE 1% Cream 1 Application(s) Topical daily  thiamine 100 milliGRAM(s) Oral daily    MEDICATIONS  (PRN):  acetaminophen    Suspension. 650 milliGRAM(s) Oral every 6 hours PRN Moderate Pain (4 - 6)  acetaminophen  Suppository 650 milliGRAM(s) Rectal every 6 hours PRN For Temp greater than 38 C (100.4 F)  dextrose Gel 1 Dose(s) Oral once PRN Blood Glucose LESS THAN 70 milliGRAM(s)/deciliter  glucagon  Injectable 1 milliGRAM(s) IntraMuscular once PRN Glucose LESS THAN 70 milligrams/deciliter

## 2018-05-14 NOTE — PROGRESS NOTE ADULT - ATTENDING COMMENTS
HD today for clearance and UF as tolerated -- BP better so hopefully will tolerate more UF  CX with large rt effusion -- HD may not be adequate to remove

## 2018-05-15 LAB
ANION GAP SERPL CALC-SCNC: 16 MMOL/L — SIGNIFICANT CHANGE UP (ref 5–17)
BUN SERPL-MCNC: 56 MG/DL — HIGH (ref 7–23)
CALCIUM SERPL-MCNC: 8.6 MG/DL — SIGNIFICANT CHANGE UP (ref 8.4–10.5)
CHLORIDE SERPL-SCNC: 97 MMOL/L — SIGNIFICANT CHANGE UP (ref 96–108)
CO2 SERPL-SCNC: 26 MMOL/L — SIGNIFICANT CHANGE UP (ref 22–31)
CREAT SERPL-MCNC: 2.98 MG/DL — HIGH (ref 0.5–1.3)
GLUCOSE BLDC GLUCOMTR-MCNC: 169 MG/DL — HIGH (ref 70–99)
GLUCOSE BLDC GLUCOMTR-MCNC: 198 MG/DL — HIGH (ref 70–99)
GLUCOSE BLDC GLUCOMTR-MCNC: 205 MG/DL — HIGH (ref 70–99)
GLUCOSE BLDC GLUCOMTR-MCNC: 235 MG/DL — HIGH (ref 70–99)
GLUCOSE SERPL-MCNC: 215 MG/DL — HIGH (ref 70–99)
HCT VFR BLD CALC: 30.6 % — LOW (ref 39–50)
HGB BLD-MCNC: 9 G/DL — LOW (ref 13–17)
INR BLD: 2.17 — HIGH (ref 0.88–1.16)
MAGNESIUM SERPL-MCNC: 2.1 MG/DL — SIGNIFICANT CHANGE UP (ref 1.6–2.6)
MCHC RBC-ENTMCNC: 28.3 PG — SIGNIFICANT CHANGE UP (ref 27–34)
MCHC RBC-ENTMCNC: 29.4 G/DL — LOW (ref 32–36)
MCV RBC AUTO: 96.2 FL — SIGNIFICANT CHANGE UP (ref 80–100)
PLATELET # BLD AUTO: 156 K/UL — SIGNIFICANT CHANGE UP (ref 150–400)
POTASSIUM SERPL-MCNC: 4.9 MMOL/L — SIGNIFICANT CHANGE UP (ref 3.5–5.3)
POTASSIUM SERPL-SCNC: 4.9 MMOL/L — SIGNIFICANT CHANGE UP (ref 3.5–5.3)
PROTHROM AB SERPL-ACNC: 24.5 SEC — HIGH (ref 9.8–12.7)
RBC # BLD: 3.18 M/UL — LOW (ref 4.2–5.8)
RBC # FLD: 17.5 % — HIGH (ref 10.3–16.9)
SODIUM SERPL-SCNC: 139 MMOL/L — SIGNIFICANT CHANGE UP (ref 135–145)
WBC # BLD: 11.7 K/UL — HIGH (ref 3.8–10.5)
WBC # FLD AUTO: 11.7 K/UL — HIGH (ref 3.8–10.5)

## 2018-05-15 PROCEDURE — 99233 SBSQ HOSP IP/OBS HIGH 50: CPT

## 2018-05-15 PROCEDURE — 71045 X-RAY EXAM CHEST 1 VIEW: CPT | Mod: 26

## 2018-05-15 PROCEDURE — 99232 SBSQ HOSP IP/OBS MODERATE 35: CPT | Mod: GC

## 2018-05-15 PROCEDURE — 99233 SBSQ HOSP IP/OBS HIGH 50: CPT | Mod: GC

## 2018-05-15 RX ORDER — WARFARIN SODIUM 2.5 MG/1
2 TABLET ORAL ONCE
Qty: 0 | Refills: 0 | Status: COMPLETED | OUTPATIENT
Start: 2018-05-15 | End: 2018-05-15

## 2018-05-15 RX ORDER — INSULIN GLARGINE 100 [IU]/ML
5 INJECTION, SOLUTION SUBCUTANEOUS AT BEDTIME
Qty: 0 | Refills: 0 | Status: DISCONTINUED | OUTPATIENT
Start: 2018-05-15 | End: 2018-05-16

## 2018-05-15 RX ORDER — MODAFINIL 200 MG/1
100 TABLET ORAL DAILY
Qty: 0 | Refills: 0 | Status: DISCONTINUED | OUTPATIENT
Start: 2018-05-15 | End: 2018-05-22

## 2018-05-15 RX ORDER — HYDROCORTISONE 20 MG
20 TABLET ORAL EVERY 12 HOURS
Qty: 0 | Refills: 0 | Status: DISCONTINUED | OUTPATIENT
Start: 2018-05-15 | End: 2018-05-21

## 2018-05-15 RX ORDER — HYDROCORTISONE 20 MG
15 TABLET ORAL ONCE
Qty: 0 | Refills: 0 | Status: COMPLETED | OUTPATIENT
Start: 2018-05-15 | End: 2018-05-15

## 2018-05-15 RX ORDER — INSULIN LISPRO 100/ML
5 VIAL (ML) SUBCUTANEOUS EVERY 6 HOURS
Qty: 0 | Refills: 0 | Status: DISCONTINUED | OUTPATIENT
Start: 2018-05-16 | End: 2018-05-16

## 2018-05-15 RX ADMIN — ERGOCALCIFEROL 6000 UNIT(S): 1.25 CAPSULE ORAL at 11:38

## 2018-05-15 RX ADMIN — LEVETIRACETAM 500 MILLIGRAM(S): 250 TABLET, FILM COATED ORAL at 14:33

## 2018-05-15 RX ADMIN — Medication 5 MILLIGRAM(S): at 22:20

## 2018-05-15 RX ADMIN — Medication 5 MILLIGRAM(S): at 03:11

## 2018-05-15 RX ADMIN — ATORVASTATIN CALCIUM 80 MILLIGRAM(S): 80 TABLET, FILM COATED ORAL at 22:21

## 2018-05-15 RX ADMIN — Medication 5 MILLIGRAM(S): at 09:29

## 2018-05-15 RX ADMIN — INSULIN HUMAN 2: 100 INJECTION, SOLUTION SUBCUTANEOUS at 23:09

## 2018-05-15 RX ADMIN — PANTOPRAZOLE SODIUM 40 MILLIGRAM(S): 20 TABLET, DELAYED RELEASE ORAL at 11:38

## 2018-05-15 RX ADMIN — INSULIN HUMAN 2: 100 INJECTION, SOLUTION SUBCUTANEOUS at 14:29

## 2018-05-15 RX ADMIN — Medication 1 APPLICATION(S): at 11:40

## 2018-05-15 RX ADMIN — CHLORHEXIDINE GLUCONATE 1 APPLICATION(S): 213 SOLUTION TOPICAL at 06:36

## 2018-05-15 RX ADMIN — Medication 4 UNIT(S): at 18:21

## 2018-05-15 RX ADMIN — Medication 4 UNIT(S): at 06:37

## 2018-05-15 RX ADMIN — WARFARIN SODIUM 2 MILLIGRAM(S): 2.5 TABLET ORAL at 22:23

## 2018-05-15 RX ADMIN — CHLORHEXIDINE GLUCONATE 15 MILLILITER(S): 213 SOLUTION TOPICAL at 09:29

## 2018-05-15 RX ADMIN — CHLORHEXIDINE GLUCONATE 15 MILLILITER(S): 213 SOLUTION TOPICAL at 22:22

## 2018-05-15 RX ADMIN — ESCITALOPRAM OXALATE 5 MILLIGRAM(S): 10 TABLET, FILM COATED ORAL at 11:39

## 2018-05-15 RX ADMIN — Medication 81 MILLIGRAM(S): at 11:39

## 2018-05-15 RX ADMIN — Medication 5 MILLIGRAM(S): at 14:32

## 2018-05-15 RX ADMIN — MIDODRINE HYDROCHLORIDE 15 MILLIGRAM(S): 2.5 TABLET ORAL at 14:33

## 2018-05-15 RX ADMIN — INSULIN HUMAN 4: 100 INJECTION, SOLUTION SUBCUTANEOUS at 18:21

## 2018-05-15 RX ADMIN — Medication 1 TABLET(S): at 11:38

## 2018-05-15 RX ADMIN — Medication 4 UNIT(S): at 14:28

## 2018-05-15 RX ADMIN — Medication 5 UNIT(S): at 23:09

## 2018-05-15 RX ADMIN — INSULIN HUMAN 4: 100 INJECTION, SOLUTION SUBCUTANEOUS at 06:36

## 2018-05-15 RX ADMIN — MIDODRINE HYDROCHLORIDE 15 MILLIGRAM(S): 2.5 TABLET ORAL at 06:36

## 2018-05-15 RX ADMIN — Medication 20 MILLIGRAM(S): at 22:21

## 2018-05-15 RX ADMIN — Medication 100 MILLIGRAM(S): at 11:39

## 2018-05-15 RX ADMIN — MIDODRINE HYDROCHLORIDE 15 MILLIGRAM(S): 2.5 TABLET ORAL at 22:21

## 2018-05-15 RX ADMIN — INSULIN GLARGINE 5 UNIT(S): 100 INJECTION, SOLUTION SUBCUTANEOUS at 23:09

## 2018-05-15 RX ADMIN — Medication 5 MILLIGRAM(S): at 09:28

## 2018-05-15 RX ADMIN — Medication 1 MILLIGRAM(S): at 11:40

## 2018-05-15 RX ADMIN — Medication 15 MILLIGRAM(S): at 14:32

## 2018-05-15 NOTE — PROGRESS NOTE ADULT - PROBLEM SELECTOR PLAN 10
VTE: Coumadin 2mg (INR 2.34)    GI PPx: PPI    DNR- Made DNR, family wants escalation of care, pressors if needed.  F: No IVF in setting of HD.  E: Replete electrolytes with caution in setting of HD.   N: Jevity 1.5 50cc/hr    Dispo: Initially admitted to MICU but now on 7LACH for further management of hypotension in setting of shock, acute encephalopathy, bacteremia and acute on chronic respiratory failure. Course complicated by Septic shock 2/2 to aspiration PNA for which patient remains off levophed. 7LACH for further management of chronic respiratory failure undergoing weaning as tolerated with CPAP/trach trials. Pending wean off albumin for dispo.

## 2018-05-15 NOTE — PROGRESS NOTE ADULT - SUBJECTIVE AND OBJECTIVE BOX
PROGRESS NOTE    CC: Septic, cardiogenic shock, CVA  Overnight Events: 10 beats of NSVT. Pressures stable MAP>65.  Interval History: Improving lethargy with intermittently following commands, intermittently following commands- no complaints that can be elicited.  ROS: As above.    OBJECTIVE  Vitals:  T(C): 37.1 (05-15-18 @ 06:00), Max: 37.6 (05-14-18 @ 11:00)  HR: 90 (05-15-18 @ 04:19) (90 - 116)  BP: 96/73 (05-15-18 @ 04:19) (86/66 - 122/78)  RR: 19 (05-15-18 @ 04:19) (12 - 21)  SpO2: 100% (05-15-18 @ 04:19) (99% - 100%)  Wt(kg): --  Mode: AC/ CMV (Assist Control/ Continuous Mandatory Ventilation)  RR (machine): 10  TV (machine): 500  FiO2: 40  PEEP: 5  ITime: 1  MAP: 9  PIP: 20    I/O:  I&O's Summary    14 May 2018 07:01  -  15 May 2018 07:00  --------------------------------------------------------  IN: 1600 mL / OUT: 2650 mL / NET: -1050 mL        PHYSICAL EXAM:  Appearance: NAD. With trach and peg. Improving lethargy, arousable and intermittently answering questions.  HEENT: PERRL. Conjunctiva clear.   Cardiovascular: Irregularly, irregular. S1, S2 with 2/6 systolic murmur along LLSB.   Chest: R Permacath in place, new clean bandage. L AICD.   Respiratory: Lungs with rhonchi b/l, right base decreased compared to L.   Gastrointestinal:  Soft, nontender. Mild distension with peg in place. 	  Extremities: 2+ edema b/l up to knees and noted dependent edema. Venous stasis changes.  LE WWP b/l.  Vascular: DP diminished though present.  Neurologic: More arousable today. Intermittently following commands. Opening eyes to verbal stimuli.      LABS:                        9.0    11.7  )-----------( 156      ( 15 May 2018 06:42 )             30.6     05-14    137  |  92<L>  |  72<H>  ----------------------------<  222<H>  5.6<H>   |  25  |  3.82<H>    Ca    9.0      14 May 2018 06:18  Phos  6.4     05-14  Mg     2.2     05-14      PT/INR - ( 15 May 2018 06:42 )   PT: 24.5 sec;   INR: 2.17                RADIOLOGY & ADDITIONAL TESTS:  Reviewed .    MEDICATIONS  (STANDING):  aspirin  chewable 81 milliGRAM(s) Oral daily  atorvastatin 80 milliGRAM(s) Oral at bedtime  chlorhexidine 0.12% Liquid 15 milliLiter(s) Swish and Spit two times a day  chlorhexidine 2% Cloths 1 Application(s) Topical daily  dextrose 5%. 1000 milliLiter(s) (50 mL/Hr) IV Continuous <Continuous>  dextrose 50% Injectable 12.5 Gram(s) IV Push once  dextrose 50% Injectable 25 Gram(s) IV Push once  dextrose 50% Injectable 25 Gram(s) IV Push once  ergocalciferol Drops 6000 Unit(s) Oral daily  escitalopram 5 milliGRAM(s) Oral daily  folic acid 1 milliGRAM(s) Oral daily  hydrocortisone 5 milliGRAM(s) Oral every 12 hours  insulin lispro Injectable (HumaLOG) 4 Unit(s) SubCutaneous every 6 hours  insulin regular  human corrective regimen sliding scale   SubCutaneous every 6 hours  levETIRAcetam  Solution 500 milliGRAM(s) Oral every 24 hours  metoclopramide 5 milliGRAM(s) Oral every 6 hours  midodrine 15 milliGRAM(s) Oral every 8 hours  multivitamin 1 Tablet(s) Oral daily  pantoprazole   Suspension 40 milliGRAM(s) Oral daily  silver sulfADIAZINE 1% Cream 1 Application(s) Topical daily  thiamine 100 milliGRAM(s) Oral daily    MEDICATIONS  (PRN):  acetaminophen    Suspension. 650 milliGRAM(s) Oral every 6 hours PRN Moderate Pain (4 - 6)  acetaminophen  Suppository 650 milliGRAM(s) Rectal every 6 hours PRN For Temp greater than 38 C (100.4 F)  dextrose Gel 1 Dose(s) Oral once PRN Blood Glucose LESS THAN 70 milliGRAM(s)/deciliter  glucagon  Injectable 1 milliGRAM(s) IntraMuscular once PRN Glucose LESS THAN 70 milligrams/deciliter

## 2018-05-15 NOTE — PROGRESS NOTE ADULT - SUBJECTIVE AND OBJECTIVE BOX
Patient is a 63y Male seen and evaluated at bedside. Patient lying in bed in no acute distress on ventilatory support via tracheostomy. Last HD treatment on 5/14 with 2.5 L UF. Tolerated procedure well.       acetaminophen    Suspension. 650 every 6 hours PRN  acetaminophen  Suppository 650 every 6 hours PRN  aspirin  chewable 81 daily  atorvastatin 80 at bedtime  chlorhexidine 0.12% Liquid 15 two times a day  chlorhexidine 2% Cloths 1 daily  dextrose 5%. 1000 <Continuous>  dextrose 50% Injectable 12.5 once  dextrose 50% Injectable 25 once  dextrose 50% Injectable 25 once  dextrose Gel 1 once PRN  ergocalciferol Drops 6000 daily  escitalopram 5 daily  folic acid 1 daily  glucagon  Injectable 1 once PRN  hydrocortisone 15 once  hydrocortisone 20 every 12 hours  insulin lispro Injectable (HumaLOG) 4 every 6 hours  insulin regular  human corrective regimen sliding scale  every 6 hours  levETIRAcetam  Solution 500 every 24 hours  metoclopramide 5 every 6 hours  midodrine 15 every 8 hours  modafinil 100 daily  multivitamin 1 daily  pantoprazole   Suspension 40 daily  silver sulfADIAZINE 1% Cream 1 daily  thiamine 100 daily      Allergies    No Known Allergies    Intolerances        T(C): , Max: 37.4 (05-15-18 @ 02:00)  T(F): , Max: 99.4 (05-15-18 @ 02:00)  HR: 102 (05-15-18 @ 12:26)  BP: 92/71 (05-15-18 @ 12:26)  BP(mean): 78 (05-15-18 @ 12:26)  RR: 20 (05-15-18 @ 12:26)  SpO2: 99% (05-15-18 @ 12:26)  Wt(kg): --    05-14 @ 07:01  -  05-15 @ 07:00  --------------------------------------------------------  IN: 1650 mL / OUT: 2650 mL / NET: -1000 mL    05-15 @ 07:01  -  05-15 @ 14:15  --------------------------------------------------------  IN: 300 mL / OUT: 0 mL / NET: 300 mL    Review of Systems:  Limited. Patient remain on ventilatory support through tracheostomy.    PHYSICAL EXAM:  GENERAL: Ill appearing, awake but confused, disoriented in no acute distress at present  HEAD:  Atraumatic, Normocephalic,   EYES: Bilateral conjuctiva and sclera normal   Oral cavity: Oral mucosa moistand pale  NECK: Neck supple, No JVD  CHEST/LUNG:  Bilateral decreased breath sounds, Bibasilar rales and crepitatiosn+nt, no wheezing  HEART: Regular rate and rhythm. HOMER II/VI at LPSB, No gallop, no rub   ABDOMEN: Soft, Nontender, nondistended, PEG tube present. BS+nt, No flank tenderness.   EXTREMITIES: Bilateral thigh and leg edema++nt, No clubbing, cyanosis  Neurology: AAOx1, awake but confused, unable to follow verbal commands  SKIN: No rashes or lesions    ACCESS: Right chest wall tunnel HD catheter with exit site with mild fibrotic changes.    LABS:                        9.0    11.7  )-----------( 156      ( 15 May 2018 06:42 )             30.6     05-15    139  |  97  |  56<H>  ----------------------------<  215<H>  4.9   |  26  |  2.98<H>    Ca    8.6      15 May 2018 06:42  Phos  6.4     05-14  Mg     2.1     05-15        PT/INR - ( 15 May 2018 06:42 )   PT: 24.5 sec;   INR: 2.17                    RADIOLOGY & ADDITIONAL STUDIES:

## 2018-05-15 NOTE — PROGRESS NOTE ADULT - PROBLEM SELECTOR PLAN 1
Acute respiratory failure in setting of septic and cardiogenic shock, as well as AMS for concern for CVA vs toxic metabolic encephalopathy. With trach (4/5/18) for persistent, now chronic respiratory failure. CXR improved effusions b/l s/p HD yesterday- took off 2.5L.   -tolerated CPAP for 12 hours today, will continue with CPAP and trach collar as tolerated.

## 2018-05-15 NOTE — CHART NOTE - NSCHARTNOTEFT_GEN_A_CORE
PGY-1 OFF SERVICE NOTE    63M PMH HFrEF 10-15% (ischemic), CAD (STEMI per [facility] records 7/17), s/p AICD, Afib, HTN, DM2 on insulin, CKD, and gout admitted for septic shock 2/2 LE cellulitis as well as concern for ATN in setting of shock. Pt was started on levophed for hypotension. Given low mix venous saturation with concern for component of cardiogenic shock given inotropes but discontinued with tachycardia. Pt was started on Vanco/ Zosyn and completed total 11 days of abx. PT was started on solucortef 50 q6h given recent hx of steroid use. Gallium scan consistent with LLE  cellulitis. TTE with no vegetations. Given worsening GILMER and low UOP, renal was consulted. HD cath was placed and pt was started on CVVD and eventually transitioned to intermittent HD. Pt was noted have b/l pulmonary effusion and required R chest tube placement with fluid studies showing exudative process. Given extensive cardiac hx and component of cardiogenic shock, cardiology was consulted. Started on vasopressin for blood pressure. Also started on afterload reduction with Isordil and Hydralazine but unable to tolerate. Also there was concern for HIT while tx w/ hep gtt for Afib, temporarily on Argatroban but switched back to hep/coumadin bridge when ruled out. Patient was planned for transfer to CCU for cardiogenic shock/HF with complication of unresponsive, decreased use of L side. Stroke code was called and he was intubated for airway protection. CT was negative MRI after 4 days noted to have chronic infarcts with possible small brainstem stroke per neurology read. He was started on Modafinil however mental status did not improve significantly. VEEG significant for slowing but no seizures. After GOC discussions with family patient had trach and PEG. Course further complicated as found to be fungemic with candida tropicalis and started on micafungin, and narrowed to fluconazole. RUKHSANA was negative for vegetations or infection of AICD. CT A/P performed given transamnitis/ elevated bili but source of candidemia unclear. HD Permacath replaced and surveillance cultures were neg for 48hrs. Noted additional fever spike for which repeat Bcx growing Klebsiella. Started on meropenem then Zosyn and further narrowed to ceftriaxone w/ sensitivities. Restarted on hydrocotrisone for weaning pressors. Given continued low-grade fevers and uptrending white count, permacath removed on 4/19 and replaced with temp HD cath. Course further complicated by seizure-like activity and VEEG placed, started on keppra 500 mg qd with additional 250cc keppra on post-HD days. Transitioned to PO prednisone and fludrocortisone to replace hydrocortisone. Noted also to have L forearm eschar with increasing fluctuance. LUE US ordered also showing R superficial thrombus and additional soft tissue US to eval for abscess in L forearm. Ortho for hand consulted for possible I&D- ultrasound demonstrated no drainage fluid- and recommended no intervention.  Albumin was weaned off during dialysis session temporarily. Course further complicated by development of increasing agitation, abdominal distension and later in AM- patient noted to be coughing up feeds/secretions- 100cc residuals noted. Aggressively suctioned. Patient started to become tachycardiac to HR 120s. CXR performed demonstrating new R lower lobe infilitrate. WIth increasing o2 requirements, transferred to MICU for concern of aspiration. Noted to have BP drop and requiring pressor support with levophed. L IJ placed for access. Zosyn started for aspiration coverage and started on stress dose steroids in setting of septic shock 2/2 aspiration PNA with underlying adrenal insufficiency. Weaned off pressors and maintained on stress steroids and midodrine. Pt maintained on ventilatory support with daily CPAP and trach collar trials as tolerated. HD was continued per renal without requirement of albumin with end goal of L-TACH. Started on reglan for motility. Resumed feeds at low rate with pt tolerating well. Pt currently being tapered off stress steroids with goal of resuming previous regimen of prednisone 6 mg and fludrocortisone 0.1 mg daily if BP allows. Given his h/o afib and LV thrombus, he was resumed back on hep gtt bridging to coumadin. Course c/b hyperglycemia to 280s in setting of resumed on feeds and stress dose steroids. Was placed on insulin gtt 4/30 and titrated off, dosed for lantus 20units and premeal 5units TID. WIth clinical improvement transferred to [facility] for further management. On 7LA patient completed course of Zosyn for aspiration. Patient bridged back to coumadin from hep gtt. Noted to have worsening controls of blood glucose with intermittent episodes of hypo/hyperglycemia. Also noted to have complications worsening tolerance of HD in setting of worsening tachycardia and decreased pressures for which patient restarted on albumin for HD. Also of note, patient made DNR with further discussion with family regarding goals of care- but continued escalation of care, with pressors, etc. Patient continues to undergo weaning trials with daily CPAP and trach collar. Improving tolerance of HD for which last HD session on Monday 5/14 for which 2.5L taken off. Dispo pending ability to wean off albumin for which further trials off albumin pending. Continued adjustments for insulin based on requirements and daily dosing of warfarin.

## 2018-05-15 NOTE — PROGRESS NOTE ADULT - PROBLEM SELECTOR PLAN 4
Patient with severe systolic CHF with worsening volume status and pleural effusion  Will give albumin and midodrine during HD  Daily weight  Fluid restriction to<1L/day  Pulmonary follow up for large right pleural effusion   Optimize CHF treatment per cardiology/CHF team

## 2018-05-15 NOTE — PROGRESS NOTE ADULT - PROBLEM SELECTOR PLAN 2
DM2 on Januvia at home. HbA1C 8.6. Has been on Lantus 42U qHS, insulin 7 units q6h. Difficulty controlling glucose levels with multiple episodes of hypoglycemia and hyperglycemia.   - Increased yesterday evening to 4U TID lispro, Adjust based on requirements.

## 2018-05-15 NOTE — PROGRESS NOTE ADULT - PROBLEM SELECTOR PLAN 9
CHF with EF 10-15% 2/2 ischemic cardiomyopathy s/p AICD. Elevated BNP on admission with R sided effusion and edema. Patient with persistent pleural effusions on CXR and US and b/l dependent edema.   -ACE/BB has been held in setting of sepsis/ hypotension. Remains on midodrine, no UOP therefore no lasix/diuresis- HD for fluid balance.     #CAD- Hx of MI and ischemic CM s/p AICD, STEMI 7/2017, was started on Aspirin and Brilinta.   - c/w aspirin 81 and Atorvastatin 80mg qhs.

## 2018-05-15 NOTE — PROGRESS NOTE ADULT - PROBLEM SELECTOR PLAN 1
Pt able to tolerate HD with albumin supplementation  pt remains on midodrine 15mg q8h  This may proved to be an obstacle to hospital discharge

## 2018-05-15 NOTE — PROGRESS NOTE ADULT - PROBLEM SELECTOR PLAN 3
Patient's calcium 8.6 and phosphorus 6.4  Continue Hectorol next during dialysis   Low k/Low phos feeding

## 2018-05-15 NOTE — PROGRESS NOTE ADULT - PROBLEM SELECTOR PLAN 8
Resolved. Presented initially with septic shock, has completed course of abx for bacteremia with Klebsiella as well as fungemia. Further complicated by septic shock 2/2 to aspiration with increasing O2 requirements while on 7lACH and transferred back to MICU. Weaned off pressors and back on 7LACH for which patient Completed additional 10 day course of Zosyn.   - continue to monitor for signs and symptoms of infection. WBC 11.7    #Fungemia  RESOLVED. Noted to have Candida Tropicalis growing in blood cultures and completed fluconazole course.

## 2018-05-15 NOTE — PROGRESS NOTE ADULT - PROBLEM SELECTOR PLAN 3
Treated for both septic as well as cardiogenic shock- requiring pressors and inotropes. Subsequently weaned off- On midodrine 15 mg TID. Has intermittently used albumin to tolerate dialysis. SBP Ranging 87//75 over last 24 hours.   - c/w Midodrine 15 mg q8h  - c/w hydrocortisone 20mg BID  - F/U renal for HD, took off 2.5L yesterday. Received albumin and will need to further discuss with renal for weaning.     #Adrenal Insufficiency  BP have been low with SBP in , with MAP> 60.  - hydrocotisone 20 mg BID  - F/u renal recs regarding HD, took off 2.5L yesterday, need to attempt to wean albumin for dispo.

## 2018-05-15 NOTE — PROGRESS NOTE ADULT - PROBLEM SELECTOR PLAN 1
Patient is a 63 year old male with GILMER on CKD requiring dialysis.     Patient was last dialyzed 5/14 with UF of 2.5L   Volume status persistently hypervolemic. Electrolytes improved K 4.9   Will defer HD treatment today.   Next HD treatment on 5/16 for UF and clearances.

## 2018-05-15 NOTE — PROGRESS NOTE ADULT - PROBLEM SELECTOR PLAN 6
Hx of Afib and LV thrombus on coumadin prior to admission. Most recent TTE with [facility] shows no LV thrombus currently.   - Not currently on rate control as HR has remained stable but has received Lopressor pushes for HR goal <110  - Coumadin 2mg dosed today. INR at 2.19 today.    #LV thrombus  LV thrombus noted on RUKHSANA on 4/10. Remains therapeutic on warfarin.  -INR at 2.34, Redosed for 2mg coumadin with INR goal 2-3.

## 2018-05-15 NOTE — PROGRESS NOTE ADULT - PROBLEM SELECTOR PLAN 4
Noted to have change in mental status- encephalopathy likely multifactorial- component of infectious (Septic/cardiogenic shock) as well as complication for brain stem infarct.   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 Per neurology may have had ischemic event from hypoperfusion. Also showing disc protrusion at C3/C4 level coursing superiorly to the C2/C3 contacting the ventral spinal cord.   -Neurology was previously consulted and was following and contributed to noted new weakness/mental status changes.   -EXAM: Improving lethargy, more arousable answering questions and commands intermittently. Improving contraction of L arm and working with OT. Moving all 4 extremities spontaneously but R>L.   -restarted Modafinil  - c/w keppra 500 mg qd with extra 250 mg post HD days for seizures

## 2018-05-15 NOTE — PROGRESS NOTE ADULT - SUBJECTIVE AND OBJECTIVE BOX
HUNTER BRIZUELA   MRN-[id number]     (1955):     HPI:  62 yo M history of HFrEF 10-15% 2/2 ischemic cardiomyopathy, MI , s/p AICD vs PPM, ?Afib, Hypertension, Diabetes Mellitus Type 2 on insulin, CKD?and gout, who presents with a chief complaint of generalized weakness. Pt wad admitted to [facility] 2 months ago for gout and was sent to rehab. He was discharged home mid Feb with VNS. In the past 2 weeks patient has noted increased leg pain and weakness bilaterally. In the last few days, he has also experienced generalized weakness prompting him to come to ER.   In ER, noted to have t max of 102, SBP 70s, leukocytosis to 32.8, Lactate of 6.6, Acute renal failure with severe metabolic derangements. Given 3.5L fluids and Vanc/Zosyn. MICU consulted. (10 Mar 2018 18:51)    Pt known to palliative medicine earlier in this hospitalization when pt was initially in the MICU.  During that time, pt was critically ill  on life support (mech ventilation, CVVHD, pressors).  [employer] met with pt's HCP, (dgt Cristal) to discuss overall goals of care, as pt was not trached or PEG'd yet.   Pt had minimal mental status. Pt was able to open eyes, follow simple commands intermittently.  Pt's dgt agreed to trach and vent as well as PEG to allow pt time to make an improvement as pt has suffered a CVA and his neurological prognosis was unclear. Palliative medicine signed off on .  The goal at that time was to allow pt to stabilize with a plan to transition to LTAC, when medically appropriate.  Pt's dgt was going to reassess pt's overall condition after some time...  Pt has remained in the hospital since .  He has been in (mostly) and out of ICU.  He remains vented thru his trach, he is on HD with high dose midodrine He currently does not require albumin to tolerate his HD sessions.  Pts mental status remains poor.    Palliative medicine will reconsult to discuss with pts dgt plan of care as pt has been hospitalized for months.     Interval History:  see above  In bed. asleep.  Flutters eyes to name and then returns to sleep.  PT remains vented although he has been on CPAP since the am (it is now 530pm).  Breathing at a rate of 22, tidal volume 320's      ROS:   nonverbal   Unable to attain ROS due to:  poor mental status                    Dyspnea (Heriberto 0-10):                       N/V (Y/N):                            Secretions (Y/N):              Agitation(Y/N):  Pain (Y/N):       -Provocation/Palliation:  -Quality/Quantity:  -Radiating:  -Severity:  -Timing/Frequency:  -Impact on ADLs:    Allergies:  No Known Allergies    Intolerances    Opiate Naive (Y/N):   yes  -iStop reviewed (Y/N):  yes ref # [id number]    MEDICATIONS  (STANDING):  aspirin  chewable 81 milliGRAM(s) Oral daily  atorvastatin 80 milliGRAM(s) Oral at bedtime  chlorhexidine 0.12% Liquid 15 milliLiter(s) Swish and Spit two times a day  chlorhexidine 2% Cloths 1 Application(s) Topical daily  dextrose 5%. 1000 milliLiter(s) (50 mL/Hr) IV Continuous <Continuous>  dextrose 50% Injectable 12.5 Gram(s) IV Push once  dextrose 50% Injectable 25 Gram(s) IV Push once  dextrose 50% Injectable 25 Gram(s) IV Push once  ergocalciferol Drops 6000 Unit(s) Oral daily  escitalopram 5 milliGRAM(s) Oral daily  folic acid 1 milliGRAM(s) Oral daily  hydrocortisone 20 milliGRAM(s) Oral every 12 hours  insulin lispro Injectable (HumaLOG) 4 Unit(s) SubCutaneous every 6 hours  insulin regular  human corrective regimen sliding scale   SubCutaneous every 6 hours  levETIRAcetam  Solution 500 milliGRAM(s) Oral every 24 hours  metoclopramide 5 milliGRAM(s) Oral every 6 hours  midodrine 15 milliGRAM(s) Oral every 8 hours  modafinil 100 milliGRAM(s) Oral daily  multivitamin 1 Tablet(s) Oral daily  pantoprazole   Suspension 40 milliGRAM(s) Oral daily  silver sulfADIAZINE 1% Cream 1 Application(s) Topical daily  thiamine 100 milliGRAM(s) Oral daily    MEDICATIONS  (PRN):  acetaminophen    Suspension. 650 milliGRAM(s) Oral every 6 hours PRN Moderate Pain (4 - 6)  acetaminophen  Suppository 650 milliGRAM(s) Rectal every 6 hours PRN For Temp greater than 38 C (100.4 F)  dextrose Gel 1 Dose(s) Oral once PRN Blood Glucose LESS THAN 70 milliGRAM(s)/deciliter  glucagon  Injectable 1 milliGRAM(s) IntraMuscular once PRN Glucose LESS THAN 70 milligrams/deciliter      Labs:                                     cbc                            9.0    11.7  )-----------( 156      ( 15 May 2018 06:42 )             30.6     05-15    139  |  97  |  56<H>  ----------------------------<  215<H>  4.9   |  26  |  2.98<H>    Ca    8.6      15 May 2018 06:42  Phos  6.4     14  Mg     2.1     15      IMAGING:        < from: Xray Chest 1 View- PORTABLE-Routine (05.15.18 @ 07:45) >  EXAM:  XR CHEST PORTABLE ROUTINE 1V                          PROCEDURE DATE:  05/15/2018         INTERPRETATION:  Portable chest    History: Follow-up abnormal exam    Pleural effusions right more so than left, right venous catheter   left-sided implanted cardiac device and tracheostomy, unchanged compared   to prior exam 2018.    < end of copied text >        PEx:  Vital Signs Last 24 Hrs  T(C): 36.7 (15 May 2018 17:30), Max: 37.4 (15 May 2018 02:00)  T(F): 98 (15 May 2018 17:30), Max: 99.4 (15 May 2018 02:00)  HR: 94 (15 May 2018 16:19) (89 - 102)  BP: 84/60 (15 May 2018 16:19) (84/60 - 101/79)  BP(mean): 65 (15 May 2018 16:19) (65 - 86)  RR: 20 (15 May 2018 16:19) (9 - 20)  SpO2: 99% (15 May 2018 16:19) (99% - 100%)      General:  ill appearing, thin, not distressed , asleep  HEENT:  nc/at, thin,  temporal wasting, moist oral cavity  Neck:   supple, neg lymphadenopathy,  L IJ TLC, trach in place-- attached to vent  CVS:  AF with frequent ectopic beats,  pacer noted to  L chest wall, + R HD catheter, distant heart tones, generalized edema, hypotensive  Resp:  non-labored, + rhonchi  GI:   nondistended, soft,  +PEG tube in place  :   + scrotal edema, anuric  Musc:   weak, thin, no spontaneous movement  Ext: significant generalized peripheral edema  Neuro:  asleep  Psych:  maritza  Skin:  thin, dry, cool, + generalized edema although improving, pitting edema to the hips bilaterally  Lymph:  neg  Preadmit Karnofsky:   50 %           Current Karnofsky:   30    %  Cachexia (Y/N):   yes  BMI:  20.6  (in March, pts BMI was 22)    Advanced Directives:     DNR     HCP noted on chart     DPOA  (for finances)       Decision maker:  Pt does not have any ability to participate in medical decision making.  Legal surrogate:  pts dgt Cristal Castro [phone] is pts HCP    Social History:   single, lives by self, pt has a HHA 4hrs/day, 3 days per week.  Pt has 2 adult children (Cristal aged 37) and pt's son, is 24 and lives in [place]. 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    He is retired but reports he is not on disability.   Pt is "spiritual" and practices a "Lutheran" raquel background   Per notes, pt has not been compliant with his medication (not picked up prescription from his outpt pharmacy) since 2017.    GOALS OF CARE DISCUSSION:       Palliative care info/counseling provided-- following	           Family meeting-         Advanced Directives addressed please see Advance Care Planning Note-- 	           Documentation of GOC: DNR in case of cardiac arrest but employ other life prolonging treatments          REFERRALS:	        Palliative Med        Unit SW/Case Mgmt       - referred       Massage Therapy-- referred       Music Therapy-- referred       Nutrition-- following

## 2018-05-15 NOTE — PROGRESS NOTE ADULT - ASSESSMENT
64 yo M history of HFrEF 10-15% 2/2 ischemic cardiomyopathy, MI , s/p AICD vs PPM, ?Afib, Hypertension, Diabetes Mellitus Type 2 on insulin, CKD and gout, who presents with a chief complaint of generalized weakness admitted for severe sepsis 2/2 LE cellulitis vs pneumonia   Pt has developed renal failure (GILMER) and was started on CVVHD, converted to HD but is hypotensive with same.    Pt has a neurological event on Friday March 23.  He was intubated for airway protection.  His mental status remains poor, off sedation.  No seizure.  No CVA.  s/p tracheostomy and ventilator to allow pts time to recover.    Pt has remained hospitalized over the past weeks, in and out of ICU, pt remains hemodynamically unstable at times. Pt remains HD dependent and ventilator dependent.    Pt has not made any improvements in his overall status. He remains chronically critically ill.

## 2018-05-15 NOTE — PROGRESS NOTE ADULT - PROBLEM SELECTOR PLAN 5
In setting of septic/cardiogenic shock-renal failure likely 2/2 to ischemic ATN with hypoperfusion. Baseline unknown but presenting Cr 3.93 with associated metabolic derangements. Started on HD during course with renal following. Permacath replaced multiple times over course for bacteremia and fungemia. Has R sided Permacath.   - Last HD yesterday, s/p 2.5L taken off, improving volume status and mental status from yesterday exam  -dispo pending ability to wean of albumin, discuss further with renal regarding weaning albumin.

## 2018-05-16 LAB
ANION GAP SERPL CALC-SCNC: 18 MMOL/L — HIGH (ref 5–17)
BUN SERPL-MCNC: 67 MG/DL — HIGH (ref 7–23)
CALCIUM SERPL-MCNC: 8.7 MG/DL — SIGNIFICANT CHANGE UP (ref 8.4–10.5)
CHLORIDE SERPL-SCNC: 94 MMOL/L — LOW (ref 96–108)
CO2 SERPL-SCNC: 25 MMOL/L — SIGNIFICANT CHANGE UP (ref 22–31)
CREAT SERPL-MCNC: 3.56 MG/DL — HIGH (ref 0.5–1.3)
GLUCOSE BLDC GLUCOMTR-MCNC: 140 MG/DL — HIGH (ref 70–99)
GLUCOSE BLDC GLUCOMTR-MCNC: 189 MG/DL — HIGH (ref 70–99)
GLUCOSE BLDC GLUCOMTR-MCNC: 198 MG/DL — HIGH (ref 70–99)
GLUCOSE BLDC GLUCOMTR-MCNC: 201 MG/DL — HIGH (ref 70–99)
GLUCOSE SERPL-MCNC: 188 MG/DL — HIGH (ref 70–99)
HCT VFR BLD CALC: 30.2 % — LOW (ref 39–50)
HGB BLD-MCNC: 8.9 G/DL — LOW (ref 13–17)
INR BLD: 2.13 — HIGH (ref 0.88–1.16)
MAGNESIUM SERPL-MCNC: 2.1 MG/DL — SIGNIFICANT CHANGE UP (ref 1.6–2.6)
MCHC RBC-ENTMCNC: 28.2 PG — SIGNIFICANT CHANGE UP (ref 27–34)
MCHC RBC-ENTMCNC: 29.5 G/DL — LOW (ref 32–36)
MCV RBC AUTO: 95.6 FL — SIGNIFICANT CHANGE UP (ref 80–100)
PLATELET # BLD AUTO: 145 K/UL — LOW (ref 150–400)
POTASSIUM SERPL-MCNC: 5.2 MMOL/L — SIGNIFICANT CHANGE UP (ref 3.5–5.3)
POTASSIUM SERPL-SCNC: 5.2 MMOL/L — SIGNIFICANT CHANGE UP (ref 3.5–5.3)
PROTHROM AB SERPL-ACNC: 24 SEC — HIGH (ref 9.8–12.7)
RBC # BLD: 3.16 M/UL — LOW (ref 4.2–5.8)
RBC # FLD: 17.3 % — HIGH (ref 10.3–16.9)
SODIUM SERPL-SCNC: 137 MMOL/L — SIGNIFICANT CHANGE UP (ref 135–145)
WBC # BLD: 13.1 K/UL — HIGH (ref 3.8–10.5)
WBC # FLD AUTO: 13.1 K/UL — HIGH (ref 3.8–10.5)

## 2018-05-16 PROCEDURE — 90935 HEMODIALYSIS ONE EVALUATION: CPT | Mod: GC

## 2018-05-16 PROCEDURE — 71045 X-RAY EXAM CHEST 1 VIEW: CPT | Mod: 26

## 2018-05-16 PROCEDURE — 99233 SBSQ HOSP IP/OBS HIGH 50: CPT | Mod: GC

## 2018-05-16 RX ORDER — LEVETIRACETAM 250 MG/1
250 TABLET, FILM COATED ORAL ONCE
Qty: 0 | Refills: 0 | Status: COMPLETED | OUTPATIENT
Start: 2018-05-16 | End: 2018-05-16

## 2018-05-16 RX ORDER — INSULIN HUMAN 100 [IU]/ML
5 INJECTION, SOLUTION SUBCUTANEOUS EVERY 6 HOURS
Qty: 0 | Refills: 0 | Status: DISCONTINUED | OUTPATIENT
Start: 2018-05-16 | End: 2018-05-22

## 2018-05-16 RX ORDER — ERYTHROPOIETIN 10000 [IU]/ML
10000 INJECTION, SOLUTION INTRAVENOUS; SUBCUTANEOUS ONCE
Qty: 0 | Refills: 0 | Status: COMPLETED | OUTPATIENT
Start: 2018-05-16 | End: 2018-05-16

## 2018-05-16 RX ORDER — DOXERCALCIFEROL 2.5 UG/1
2 CAPSULE ORAL ONCE
Qty: 0 | Refills: 0 | Status: COMPLETED | OUTPATIENT
Start: 2018-05-16 | End: 2018-05-16

## 2018-05-16 RX ORDER — WARFARIN SODIUM 2.5 MG/1
2 TABLET ORAL ONCE
Qty: 0 | Refills: 0 | Status: COMPLETED | OUTPATIENT
Start: 2018-05-16 | End: 2018-05-16

## 2018-05-16 RX ORDER — METOCLOPRAMIDE HCL 10 MG
5 TABLET ORAL EVERY 8 HOURS
Qty: 0 | Refills: 0 | Status: DISCONTINUED | OUTPATIENT
Start: 2018-05-16 | End: 2018-05-25

## 2018-05-16 RX ADMIN — Medication 5 MILLIGRAM(S): at 21:36

## 2018-05-16 RX ADMIN — PANTOPRAZOLE SODIUM 40 MILLIGRAM(S): 20 TABLET, DELAYED RELEASE ORAL at 11:33

## 2018-05-16 RX ADMIN — MIDODRINE HYDROCHLORIDE 15 MILLIGRAM(S): 2.5 TABLET ORAL at 22:03

## 2018-05-16 RX ADMIN — ERGOCALCIFEROL 6000 UNIT(S): 1.25 CAPSULE ORAL at 11:34

## 2018-05-16 RX ADMIN — ERYTHROPOIETIN 10000 UNIT(S): 10000 INJECTION, SOLUTION INTRAVENOUS; SUBCUTANEOUS at 19:32

## 2018-05-16 RX ADMIN — ATORVASTATIN CALCIUM 80 MILLIGRAM(S): 80 TABLET, FILM COATED ORAL at 21:36

## 2018-05-16 RX ADMIN — INSULIN HUMAN 5 UNIT(S): 100 INJECTION, SOLUTION SUBCUTANEOUS at 18:47

## 2018-05-16 RX ADMIN — ESCITALOPRAM OXALATE 5 MILLIGRAM(S): 10 TABLET, FILM COATED ORAL at 11:34

## 2018-05-16 RX ADMIN — Medication 20 MILLIGRAM(S): at 09:15

## 2018-05-16 RX ADMIN — LEVETIRACETAM 500 MILLIGRAM(S): 250 TABLET, FILM COATED ORAL at 14:54

## 2018-05-16 RX ADMIN — INSULIN HUMAN 2: 100 INJECTION, SOLUTION SUBCUTANEOUS at 11:39

## 2018-05-16 RX ADMIN — WARFARIN SODIUM 2 MILLIGRAM(S): 2.5 TABLET ORAL at 21:36

## 2018-05-16 RX ADMIN — Medication 5 MILLIGRAM(S): at 09:14

## 2018-05-16 RX ADMIN — Medication 20 MILLIGRAM(S): at 22:03

## 2018-05-16 RX ADMIN — Medication 1 APPLICATION(S): at 11:33

## 2018-05-16 RX ADMIN — DOXERCALCIFEROL 2 MICROGRAM(S): 2.5 CAPSULE ORAL at 19:32

## 2018-05-16 RX ADMIN — MIDODRINE HYDROCHLORIDE 15 MILLIGRAM(S): 2.5 TABLET ORAL at 14:55

## 2018-05-16 RX ADMIN — CHLORHEXIDINE GLUCONATE 15 MILLILITER(S): 213 SOLUTION TOPICAL at 09:14

## 2018-05-16 RX ADMIN — Medication 1 TABLET(S): at 11:34

## 2018-05-16 RX ADMIN — MIDODRINE HYDROCHLORIDE 15 MILLIGRAM(S): 2.5 TABLET ORAL at 06:24

## 2018-05-16 RX ADMIN — MODAFINIL 100 MILLIGRAM(S): 200 TABLET ORAL at 11:34

## 2018-05-16 RX ADMIN — Medication 5 UNIT(S): at 07:04

## 2018-05-16 RX ADMIN — Medication 81 MILLIGRAM(S): at 11:34

## 2018-05-16 RX ADMIN — Medication 1 MILLIGRAM(S): at 11:33

## 2018-05-16 RX ADMIN — INSULIN HUMAN 4: 100 INJECTION, SOLUTION SUBCUTANEOUS at 07:04

## 2018-05-16 RX ADMIN — Medication 100 MILLIGRAM(S): at 11:33

## 2018-05-16 RX ADMIN — Medication 5 MILLIGRAM(S): at 14:54

## 2018-05-16 RX ADMIN — Medication 5 UNIT(S): at 11:38

## 2018-05-16 RX ADMIN — CHLORHEXIDINE GLUCONATE 15 MILLILITER(S): 213 SOLUTION TOPICAL at 21:36

## 2018-05-16 NOTE — PROGRESS NOTE ADULT - PROBLEM SELECTOR PLAN 9
CHF with EF 10-15% 2/2 ischemic cardiomyopathy s/p AICD. Elevated BNP on admission with R sided effusion and edema. Patient with persistent pleural effusions on CXR and US and b/l dependent edema. Assisted now with HD.  -HOLDING ACE/BB in setting of sepsis/ hypotension  -c/w midodrine  - anuric, therefore no Lasix/diuresis  - c/w HD for fluid balance    #CAD- Hx of MI and ischemic CM s/p AICD, STEMI 7/2017, was started on Aspirin and Brilinta.   - c/w aspirin 81 and Atorvastatin 80mg qhs.

## 2018-05-16 NOTE — PROGRESS NOTE ADULT - PROBLEM SELECTOR PLAN 7
Likely 2/2 CKD, No bloody output. Prior studies consistent with Anemia of chronic disease. Hgb stable at 8.9 today  - Keep active T&S, Monitor H/H

## 2018-05-16 NOTE — PROGRESS NOTE ADULT - PROBLEM SELECTOR PLAN 6
Hx of Afib and LV thrombus on Coumadin prior to admission. Most recent TTE with [facility] shows no LV thrombus currently.   - Not currently on rate control as HR has remained stable but has received Lopressor pushes for HR goal <110  - Coumadin 2mg dosed for tonight. INR at 2.13 today, goal 2-3    #LV thrombus  LV thrombus noted on RUKHSANA on 4/10. Remains therapeutic on warfarin.  -INR at 2.19, redosed for 2mg coumadin with INR goal 2-3.

## 2018-05-16 NOTE — PROGRESS NOTE ADULT - PROBLEM SELECTOR PLAN 1
Acute kidney injury due to ischemic ATN requiring ongoing HD treatment via Right chest wall tunnel HD catheter.  Patient was last dialyzed 5/14 with UF of 2.5L   Volume status persistently hypervolemic and  K 5.2  Will schedule for HD treatment for UF and clearances.  Low K/Low phos/renal diet

## 2018-05-16 NOTE — PROGRESS NOTE ADULT - SUBJECTIVE AND OBJECTIVE BOX
CC: Patient is a 63y old  Male who presents with a chief complaint of generalized weakness    OVERNIGHT EVENTS: Pt was on AC/VC overnight.    SUBJECTIVE / INTERVAL HPI: Patient seen and examined at bedside. Seen resting comfortably on AC/VC, switched to CPAP. Resting comfortably; awakens to voice and nods yes/no to questions.     VITAL SIGNS:  Vital Signs Last 24 Hrs  T(C): 36.3 (16 May 2018 13:16), Max: 37.2 (16 May 2018 09:45)  T(F): 97.4 (16 May 2018 13:16), Max: 99 (16 May 2018 09:45)  HR: 90 (16 May 2018 14:55) (80 - 96)  BP: 97/70 (16 May 2018 14:55) (82/62 - 100/71)  BP(mean): 79 (16 May 2018 14:55) (65 - 80)  RR: 20 (16 May 2018 14:55) (16 - 23)  SpO2: 99% (16 May 2018 14:55) (97% - 100%)    PHYSICAL EXAM:    General: elderly thin frail male lying in bed, on AC/VC, in NAD, appears comfortable with no respiratory distress  HEENT: NC/AT; EOMI, anicteric sclera  Neck: +trach  Cardiovascular: +S1/S2; RRR  Respiratory: bilateral crackles; diminished breath sounds, no W/R/R  Gastrointestinal: soft, NT/ND; +BSx4  Extremities: WWP; 2+ pitting edema to bilateral thighs; chronic venous stasis changes bilateral LE  Vascular: 2+ radial, DP pulses B/L  Neurological: alert awake oriented; no focal deficits    MEDICATIONS:  MEDICATIONS  (STANDING):  aspirin  chewable 81 milliGRAM(s) Oral daily  atorvastatin 80 milliGRAM(s) Oral at bedtime  chlorhexidine 0.12% Liquid 15 milliLiter(s) Swish and Spit two times a day  chlorhexidine 2% Cloths 1 Application(s) Topical daily  dextrose 5%. 1000 milliLiter(s) (50 mL/Hr) IV Continuous <Continuous>  dextrose 50% Injectable 12.5 Gram(s) IV Push once  dextrose 50% Injectable 25 Gram(s) IV Push once  dextrose 50% Injectable 25 Gram(s) IV Push once  doxercalciferol Injectable 2 MICROGram(s) IV Push once  epoetin amy Injectable 91824 Unit(s) IV Push once  ergocalciferol Drops 6000 Unit(s) Oral daily  escitalopram 5 milliGRAM(s) Oral daily  folic acid 1 milliGRAM(s) Oral daily  hydrocortisone 20 milliGRAM(s) Oral every 12 hours  insulin regular  human corrective regimen sliding scale   SubCutaneous every 6 hours  insulin regular  human recombinant 5 Unit(s) SubCutaneous every 6 hours  levETIRAcetam  Solution 500 milliGRAM(s) Oral every 24 hours  metoclopramide 5 milliGRAM(s) Oral every 8 hours  midodrine 15 milliGRAM(s) Oral every 8 hours  modafinil 100 milliGRAM(s) Oral daily  multivitamin 1 Tablet(s) Oral daily  pantoprazole   Suspension 40 milliGRAM(s) Oral daily  silver sulfADIAZINE 1% Cream 1 Application(s) Topical daily  warfarin 2 milliGRAM(s) Oral once    MEDICATIONS  (PRN):  acetaminophen    Suspension. 650 milliGRAM(s) Oral every 6 hours PRN Moderate Pain (4 - 6)  acetaminophen  Suppository 650 milliGRAM(s) Rectal every 6 hours PRN For Temp greater than 38 C (100.4 F)  dextrose Gel 1 Dose(s) Oral once PRN Blood Glucose LESS THAN 70 milliGRAM(s)/deciliter  glucagon  Injectable 1 milliGRAM(s) IntraMuscular once PRN Glucose LESS THAN 70 milligrams/deciliter      ALLERGIES:  Allergies    No Known Allergies    Intolerances        LABS:                        8.9    13.1  )-----------( 145      ( 16 May 2018 06:56 )             30.2     05-16    137  |  94<L>  |  67<H>  ----------------------------<  188<H>  5.2   |  25  |  3.56<H>    Ca    8.7      16 May 2018 06:56  Mg     2.1     05-16      PT/INR - ( 16 May 2018 06:56 )   PT: 24.0 sec;   INR: 2.13              CAPILLARY BLOOD GLUCOSE      POCT Blood Glucose.: 189 mg/dL (16 May 2018 11:37)      RADIOLOGY & ADDITIONAL TESTS: Reviewed. CC: Patient is a 63y old  Male who presents with a chief complaint of generalized weakness    OVERNIGHT EVENTS: Pt was on AC/VC overnight.    SUBJECTIVE / INTERVAL HPI: Patient seen and examined at bedside. Seen resting comfortably on AC/VC, switched to CPAP. Resting comfortably; awakens to voice and nods yes/no to questions.     VITAL SIGNS:  Vital Signs Last 24 Hrs  T(C): 36.3 (16 May 2018 13:16), Max: 37.2 (16 May 2018 09:45)  T(F): 97.4 (16 May 2018 13:16), Max: 99 (16 May 2018 09:45)  HR: 90 (16 May 2018 14:55) (80 - 96)  BP: 97/70 (16 May 2018 14:55) (82/62 - 100/71)  BP(mean): 79 (16 May 2018 14:55) (65 - 80)  RR: 20 (16 May 2018 14:55) (16 - 23)  SpO2: 99% (16 May 2018 14:55) (97% - 100%)    PHYSICAL EXAM:    General: elderly thin frail male lying in bed, on AC/VC, in NAD, appears comfortable with no respiratory distress  HEENT: NC/AT; EOMI, anicteric sclera  Neck: +trach  Cardiovascular: +S1/S2; RRR  Respiratory: bilateral crackles; diminished breath sounds, no W/R/R  Gastrointestinal: soft, NT/ND; +BSx4  Extremities: WWP; 2+ pitting edema to bilateral thighs; chronic venous stasis changes bilateral LE  Vascular: 2+ radial, DP pulses B/L  Neurological: alert awake oriented; no focal deficits  Skin: no rash    MEDICATIONS:  MEDICATIONS  (STANDING):  aspirin  chewable 81 milliGRAM(s) Oral daily  atorvastatin 80 milliGRAM(s) Oral at bedtime  chlorhexidine 0.12% Liquid 15 milliLiter(s) Swish and Spit two times a day  chlorhexidine 2% Cloths 1 Application(s) Topical daily  dextrose 5%. 1000 milliLiter(s) (50 mL/Hr) IV Continuous <Continuous>  dextrose 50% Injectable 12.5 Gram(s) IV Push once  dextrose 50% Injectable 25 Gram(s) IV Push once  dextrose 50% Injectable 25 Gram(s) IV Push once  doxercalciferol Injectable 2 MICROGram(s) IV Push once  epoetin amy Injectable 20332 Unit(s) IV Push once  ergocalciferol Drops 6000 Unit(s) Oral daily  escitalopram 5 milliGRAM(s) Oral daily  folic acid 1 milliGRAM(s) Oral daily  hydrocortisone 20 milliGRAM(s) Oral every 12 hours  insulin regular  human corrective regimen sliding scale   SubCutaneous every 6 hours  insulin regular  human recombinant 5 Unit(s) SubCutaneous every 6 hours  levETIRAcetam  Solution 500 milliGRAM(s) Oral every 24 hours  metoclopramide 5 milliGRAM(s) Oral every 8 hours  midodrine 15 milliGRAM(s) Oral every 8 hours  modafinil 100 milliGRAM(s) Oral daily  multivitamin 1 Tablet(s) Oral daily  pantoprazole   Suspension 40 milliGRAM(s) Oral daily  silver sulfADIAZINE 1% Cream 1 Application(s) Topical daily  warfarin 2 milliGRAM(s) Oral once    MEDICATIONS  (PRN):  acetaminophen    Suspension. 650 milliGRAM(s) Oral every 6 hours PRN Moderate Pain (4 - 6)  acetaminophen  Suppository 650 milliGRAM(s) Rectal every 6 hours PRN For Temp greater than 38 C (100.4 F)  dextrose Gel 1 Dose(s) Oral once PRN Blood Glucose LESS THAN 70 milliGRAM(s)/deciliter  glucagon  Injectable 1 milliGRAM(s) IntraMuscular once PRN Glucose LESS THAN 70 milligrams/deciliter      ALLERGIES:  Allergies    No Known Allergies    Intolerances        LABS:                        8.9    13.1  )-----------( 145      ( 16 May 2018 06:56 )             30.2     05-16    137  |  94<L>  |  67<H>  ----------------------------<  188<H>  5.2   |  25  |  3.56<H>    Ca    8.7      16 May 2018 06:56  Mg     2.1     05-16      PT/INR - ( 16 May 2018 06:56 )   PT: 24.0 sec;   INR: 2.13              CAPILLARY BLOOD GLUCOSE      POCT Blood Glucose.: 189 mg/dL (16 May 2018 11:37)      RADIOLOGY & ADDITIONAL TESTS: Reviewed.

## 2018-05-16 NOTE — PROGRESS NOTE ADULT - PROBLEM SELECTOR PLAN 4
Patient with severe systolic CHF with worsening volume status and pleural effusion  midodrine po  Optimize CHF treatment per cardiology/CHF team

## 2018-05-16 NOTE — PROGRESS NOTE ADULT - ASSESSMENT
63M PMH HFrEF 10-15% (ischemic), MI, s/p AICD vs PPM, possible Afib, HTN, DM2 on insulin, possible CKD, and gout, who presented with a chief complaint of generalized weakness (3/10/2018) s/p septic shock likely 2/2 LE cellulitis complicated by ATN requiring dialysis. Course complicated by AMS likely from brainstem infarct 2/2 LV thrombus vs toxic metabolic encephalopathy. Further complicated by development of candidemia and sepsis 2/2 klebsiella bacteremia s/p fluconazole and CTX, now resolved. s/p completed course of Zosyn for aspiration PNA.  Goals of care discussion for which patient made DNR, but with continued escalation of care. Continues to undergo management for respiratory failure with weaning as tolerated with CPAP, trach collar trials. With complications of hypotension, attempting to wean from albumin for HD.

## 2018-05-16 NOTE — PROGRESS NOTE ADULT - PROBLEM SELECTOR PLAN 10
VTE: Coumadin 2mg, with INR daily checks, goal 2-3    GI PPx: PPI    DNR- Made DNR, family wants escalation of care, pressors if needed.  F: No IVF in setting of HD.  E: Replete electrolytes with caution in setting of HD.   N: Jevity 1.5 50cc/hr    Dispo: Likely LTAC, pending HD off albumin

## 2018-05-16 NOTE — PROGRESS NOTE ADULT - PROBLEM SELECTOR PLAN 3
Patient's calcium 8.7 and phosphorus 6.4  Continue Hectorol  during dialysis   Low k/Low phos feeding

## 2018-05-16 NOTE — PROGRESS NOTE ADULT - PROBLEM SELECTOR PLAN 4
IMPROVING, awake and following some commands, nodding yes/no to questions.  Noted to have change in mental status during admission, with concerns of encephalopathy likely multifactorial in setting of septic and cardiogenic shock as well as c/b brain stem infarct.   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 Per neurology may have had ischemic event from hypoperfusion. Also showing disc protrusion at C3/C4 level coursing superiorly to the C2/C3 contacting the ventral spinal cord.   - Neurology was previously following  - c/w Modafinil  - c/w Keppra 500 mg qd with extra 250 mg post HD days for seizures

## 2018-05-16 NOTE — PROGRESS NOTE ADULT - SUBJECTIVE AND OBJECTIVE BOX
Patient is a 63y Male seen and evaluated at bedside. Patient lying in bed in no acute distress on ventilatory support through tracheostomy. Last HD on 5/14 with 2.5 L UF. Tolerated procedure well.    acetaminophen    Suspension. 650 every 6 hours PRN  acetaminophen  Suppository 650 every 6 hours PRN  aspirin  chewable 81 daily  atorvastatin 80 at bedtime  chlorhexidine 0.12% Liquid 15 two times a day  chlorhexidine 2% Cloths 1 daily  dextrose 5%. 1000 <Continuous>  dextrose 50% Injectable 12.5 once  dextrose 50% Injectable 25 once  dextrose 50% Injectable 25 once  dextrose Gel 1 once PRN  doxercalciferol Injectable 2 once  epoetin amy Injectable 69646 once  ergocalciferol Drops 6000 daily  escitalopram 5 daily  folic acid 1 daily  glucagon  Injectable 1 once PRN  hydrocortisone 20 every 12 hours  insulin glargine Injectable (LANTUS) 5 at bedtime  insulin lispro Injectable (HumaLOG) 5 every 6 hours  insulin regular  human corrective regimen sliding scale  every 6 hours  levETIRAcetam  Solution 500 every 24 hours  metoclopramide 5 every 6 hours  midodrine 15 every 8 hours  modafinil 100 daily  multivitamin 1 daily  pantoprazole   Suspension 40 daily  silver sulfADIAZINE 1% Cream 1 daily  thiamine 100 daily      Allergies    No Known Allergies    Intolerances        T(C): , Max: 37.2 (05-16-18 @ 09:45)  T(F): , Max: 99 (05-16-18 @ 09:45)  HR: 93 (05-16-18 @ 09:25)  BP: 100/71 (05-16-18 @ 08:19)  BP(mean): 79 (05-16-18 @ 08:19)  RR: 19 (05-16-18 @ 09:25)  SpO2: 100% (05-16-18 @ 09:25)  Wt(kg): --    05-15 @ 07:01  -  05-16 @ 07:00  --------------------------------------------------------  IN: 700 mL / OUT: 0 mL / NET: 700 mL    05-16 @ 07:01  -  05-16 @ 11:27  --------------------------------------------------------  IN: 250 mL / OUT: 0 mL / NET: 250 mL    Review of Systems:  Limited. Patient remain on ventilatory support through tracheostomy.    PHYSICAL EXAM:  GENERAL: Ill appearing, awake but confused, disoriented in no acute distress at present  HEAD:  Atraumatic, Normocephalic,   EYES: Bilateral conjuctiva and sclera normal   Oral cavity: Oral mucosa moist and pale  NECK: Neck supple, No JVD  CHEST/LUNG:  Bilateral decreased breath sounds, Bibasilar rales and crepitatiosn+nt, Right>left, no wheezing  HEART: Regular rate and rhythm. HOMER II/VI at LPSB, No gallop, no rub   ABDOMEN: Soft, Nontender, nondistended, PEG tube present. BS+nt, No flank tenderness.   EXTREMITIES: Bilateral thigh and leg edema++nt, No clubbing, cyanosis  Neurology: AAOx1, awake but confused, unable to follow verbal commands  SKIN: No rashes or lesions    ACCESS: Right chest wall tunnel HD catheter with exit site with mild fibrotic changes.            LABS:                        8.9    13.1  )-----------( 145      ( 16 May 2018 06:56 )             30.2     05-16    137  |  94<L>  |  67<H>  ----------------------------<  188<H>  5.2   |  25  |  3.56<H>    Ca    8.7      16 May 2018 06:56  Mg     2.1     05-16        PT/INR - ( 16 May 2018 06:56 )   PT: 24.0 sec;   INR: 2.13                    RADIOLOGY & ADDITIONAL STUDIES:  < from: Xray Chest 1 View- PORTABLE-Routine (05.15.18 @ 07:45) >    EXAM:  XR CHEST PORTABLE ROUTINE 1V                          PROCEDURE DATE:  05/15/2018                     INTERPRETATION:  Portable chest    History: Follow-up abnormal exam    Pleural effusions right more so than left, right venous catheter   left-sided implanted cardiac device and tracheostomy, unchanged compared   to prior exam 5/14/2018.            "Thank you for the opportunity to participate in the care of this   patient."        HESHAM BERRIOS M.D., ATTENDING RADIOLOGIST  This document has been electronically signed. May 15 2018 11:55AM                  < end of copied text >

## 2018-05-16 NOTE — PROGRESS NOTE ADULT - PROBLEM SELECTOR PLAN 2
DM2 on Januvia at home. HbA1C 8.6. Has been on Lantus 42U qHS, insulin 7 units q6h. Difficulty controlling glucose levels with multiple episodes of hypoglycemia and hyperglycemia.   - placed on regular insulin 5U q

## 2018-05-16 NOTE — PROGRESS NOTE ADULT - SUBJECTIVE AND OBJECTIVE BOX
Patient was seen and evaluated on dialysis.   Patient is tolerating the procedure well.   HR: 86 (05-16-18 @ 16:45)  BP: 103/79 (05-16-18 @ 16:45)  Continue dialysis:   Dialyzer: REvaclear 400         QB: 350       QD: 500 2K+   Goal UF 2.5 to 3 Kg over 4 Hours

## 2018-05-16 NOTE — PROGRESS NOTE ADULT - PROBLEM SELECTOR PLAN 1
Acute respiratory failure in setting of septic and cardiogenic shock, with trach placed on 4/5/18 for persistent, now chronic respiratory failure. CXR showing effusions, tolerated HD 2 days ago 2.5L removed; for HD today trialing without albumin  - tolerating CPAP, will place on trach collar today and adjust as tolerated  - HD today, f/u renal recs

## 2018-05-16 NOTE — PROGRESS NOTE ADULT - ATTENDING COMMENTS
has been tolerating HD better -- still quite overloaded   HD today -- UF as tolerated-- d/w team - per pulm not candidate for thoracentesis now

## 2018-05-16 NOTE — PROGRESS NOTE ADULT - PROBLEM SELECTOR PLAN 3
Treated for both septic as well as cardiogenic shock requiring pressors and inotropes. Subsequently weaned off, now on midodrine 15 mg TID. Has intermittently used albumin to tolerate dialysis. BP 80/60s.  - c/w Midodrine 15 mg q8h  - c/w hydrocortisone 20mg BID  - for HD today, f/u renal recs    #Adrenal Insufficiency  BP have been low with SBP in , with MAP> 60.  - c/w hydrocortisone 20 mg BID  - will attempt HD off albumin today

## 2018-05-16 NOTE — PROGRESS NOTE ADULT - PROBLEM SELECTOR PLAN 8
RESOLVED.  Presented initially with septic shock, has completed course of abx for bacteremia with Klebsiella as well as fungemia. Further complicated by septic shock 2/2 to aspiration with increasing O2 requirements while on 7Lach and transferred back to MICU. Weaned off pressors and back on 7La for which patient completed additional 10 day course of Zosyn.   - continue to monitor for signs and symptoms of infection  - WBC up to 13.1K today, continue to monitor    #Fungemia  RESOLVED. Noted to have Candida Tropicalis growing in blood cultures and completed fluconazole course.

## 2018-05-16 NOTE — PROGRESS NOTE ADULT - PROBLEM SELECTOR PLAN 5
Failure likely 2/2 to ischemic ATN with hypoperfusion in setting of shock. Baseline unknown but presenting Cr 3.93 with associated metabolic derangements. Started on HD during course with renal following. Permacath replaced multiple times over course for bacteremia and fungemia. Has R sided Permacath.   - for HD today off albumin, last HD on 5/14 with 2.5L removed

## 2018-05-16 NOTE — PROGRESS NOTE ADULT - ATTENDING COMMENTS
seen on HD , agree with above  tolerating rx with VSS  on TCO2 in NAD  cont rx -- UF as tolerated to support vent wean

## 2018-05-17 LAB
ANION GAP SERPL CALC-SCNC: 12 MMOL/L — SIGNIFICANT CHANGE UP (ref 5–17)
BUN SERPL-MCNC: 39 MG/DL — HIGH (ref 7–23)
CALCIUM SERPL-MCNC: 8.4 MG/DL — SIGNIFICANT CHANGE UP (ref 8.4–10.5)
CHLORIDE SERPL-SCNC: 93 MMOL/L — LOW (ref 96–108)
CO2 SERPL-SCNC: 28 MMOL/L — SIGNIFICANT CHANGE UP (ref 22–31)
CREAT SERPL-MCNC: 2.41 MG/DL — HIGH (ref 0.5–1.3)
GLUCOSE BLDC GLUCOMTR-MCNC: 166 MG/DL — HIGH (ref 70–99)
GLUCOSE BLDC GLUCOMTR-MCNC: 172 MG/DL — HIGH (ref 70–99)
GLUCOSE BLDC GLUCOMTR-MCNC: 191 MG/DL — HIGH (ref 70–99)
GLUCOSE BLDC GLUCOMTR-MCNC: 199 MG/DL — HIGH (ref 70–99)
GLUCOSE BLDC GLUCOMTR-MCNC: 235 MG/DL — HIGH (ref 70–99)
GLUCOSE SERPL-MCNC: 254 MG/DL — HIGH (ref 70–99)
INR BLD: 2.09 — HIGH (ref 0.88–1.16)
INR BLD: 2.16 — HIGH (ref 0.88–1.16)
POTASSIUM SERPL-MCNC: 4.2 MMOL/L — SIGNIFICANT CHANGE UP (ref 3.5–5.3)
POTASSIUM SERPL-SCNC: 4.2 MMOL/L — SIGNIFICANT CHANGE UP (ref 3.5–5.3)
PROTHROM AB SERPL-ACNC: 23.6 SEC — HIGH (ref 9.8–12.7)
PROTHROM AB SERPL-ACNC: 24.3 SEC — HIGH (ref 9.8–12.7)
SODIUM SERPL-SCNC: 133 MMOL/L — LOW (ref 135–145)

## 2018-05-17 PROCEDURE — 90935 HEMODIALYSIS ONE EVALUATION: CPT | Mod: GC

## 2018-05-17 PROCEDURE — 99233 SBSQ HOSP IP/OBS HIGH 50: CPT

## 2018-05-17 PROCEDURE — 71045 X-RAY EXAM CHEST 1 VIEW: CPT | Mod: 26

## 2018-05-17 PROCEDURE — 99233 SBSQ HOSP IP/OBS HIGH 50: CPT | Mod: GC

## 2018-05-17 RX ORDER — WARFARIN SODIUM 2.5 MG/1
2 TABLET ORAL ONCE
Qty: 0 | Refills: 0 | Status: COMPLETED | OUTPATIENT
Start: 2018-05-17 | End: 2018-05-17

## 2018-05-17 RX ORDER — COLLAGENASE CLOSTRIDIUM HIST. 250 UNIT/G
1 OINTMENT (GRAM) TOPICAL DAILY
Qty: 0 | Refills: 0 | Status: DISCONTINUED | OUTPATIENT
Start: 2018-05-17 | End: 2018-05-26

## 2018-05-17 RX ADMIN — ERGOCALCIFEROL 6000 UNIT(S): 1.25 CAPSULE ORAL at 14:47

## 2018-05-17 RX ADMIN — LEVETIRACETAM 400 MILLIGRAM(S): 250 TABLET, FILM COATED ORAL at 01:37

## 2018-05-17 RX ADMIN — INSULIN HUMAN 2: 100 INJECTION, SOLUTION SUBCUTANEOUS at 23:49

## 2018-05-17 RX ADMIN — Medication 5 MILLIGRAM(S): at 06:38

## 2018-05-17 RX ADMIN — Medication 20 MILLIGRAM(S): at 08:53

## 2018-05-17 RX ADMIN — CHLORHEXIDINE GLUCONATE 15 MILLILITER(S): 213 SOLUTION TOPICAL at 08:53

## 2018-05-17 RX ADMIN — MIDODRINE HYDROCHLORIDE 15 MILLIGRAM(S): 2.5 TABLET ORAL at 06:40

## 2018-05-17 RX ADMIN — INSULIN HUMAN 5 UNIT(S): 100 INJECTION, SOLUTION SUBCUTANEOUS at 23:50

## 2018-05-17 RX ADMIN — Medication 20 MILLIGRAM(S): at 21:33

## 2018-05-17 RX ADMIN — Medication 1 MILLIGRAM(S): at 14:47

## 2018-05-17 RX ADMIN — WARFARIN SODIUM 2 MILLIGRAM(S): 2.5 TABLET ORAL at 21:33

## 2018-05-17 RX ADMIN — PANTOPRAZOLE SODIUM 40 MILLIGRAM(S): 20 TABLET, DELAYED RELEASE ORAL at 14:47

## 2018-05-17 RX ADMIN — INSULIN HUMAN 5 UNIT(S): 100 INJECTION, SOLUTION SUBCUTANEOUS at 00:29

## 2018-05-17 RX ADMIN — ESCITALOPRAM OXALATE 5 MILLIGRAM(S): 10 TABLET, FILM COATED ORAL at 14:47

## 2018-05-17 RX ADMIN — CHLORHEXIDINE GLUCONATE 15 MILLILITER(S): 213 SOLUTION TOPICAL at 21:33

## 2018-05-17 RX ADMIN — MIDODRINE HYDROCHLORIDE 15 MILLIGRAM(S): 2.5 TABLET ORAL at 14:47

## 2018-05-17 RX ADMIN — Medication 1 TABLET(S): at 14:47

## 2018-05-17 RX ADMIN — INSULIN HUMAN 5 UNIT(S): 100 INJECTION, SOLUTION SUBCUTANEOUS at 17:13

## 2018-05-17 RX ADMIN — LEVETIRACETAM 500 MILLIGRAM(S): 250 TABLET, FILM COATED ORAL at 14:47

## 2018-05-17 RX ADMIN — MIDODRINE HYDROCHLORIDE 15 MILLIGRAM(S): 2.5 TABLET ORAL at 23:53

## 2018-05-17 RX ADMIN — INSULIN HUMAN 5 UNIT(S): 100 INJECTION, SOLUTION SUBCUTANEOUS at 12:28

## 2018-05-17 RX ADMIN — CHLORHEXIDINE GLUCONATE 1 APPLICATION(S): 213 SOLUTION TOPICAL at 06:38

## 2018-05-17 RX ADMIN — INSULIN HUMAN 5 UNIT(S): 100 INJECTION, SOLUTION SUBCUTANEOUS at 06:53

## 2018-05-17 RX ADMIN — MODAFINIL 100 MILLIGRAM(S): 200 TABLET ORAL at 14:47

## 2018-05-17 RX ADMIN — INSULIN HUMAN 2: 100 INJECTION, SOLUTION SUBCUTANEOUS at 17:12

## 2018-05-17 RX ADMIN — Medication 1 APPLICATION(S): at 14:46

## 2018-05-17 RX ADMIN — INSULIN HUMAN 2: 100 INJECTION, SOLUTION SUBCUTANEOUS at 06:53

## 2018-05-17 RX ADMIN — Medication 5 MILLIGRAM(S): at 14:47

## 2018-05-17 RX ADMIN — Medication 5 MILLIGRAM(S): at 21:33

## 2018-05-17 RX ADMIN — INSULIN HUMAN 4: 100 INJECTION, SOLUTION SUBCUTANEOUS at 12:28

## 2018-05-17 RX ADMIN — INSULIN HUMAN 2: 100 INJECTION, SOLUTION SUBCUTANEOUS at 00:29

## 2018-05-17 RX ADMIN — Medication 81 MILLIGRAM(S): at 14:47

## 2018-05-17 RX ADMIN — ATORVASTATIN CALCIUM 80 MILLIGRAM(S): 80 TABLET, FILM COATED ORAL at 21:33

## 2018-05-17 NOTE — PROGRESS NOTE ADULT - SUBJECTIVE AND OBJECTIVE BOX
INTERVAL HPI/OVERNIGHT EVENTS:    SUBJECTIVE: Patient seen and examined at bedside.    OBJECTIVE:    VITAL SIGNS:  ICU Vital Signs Last 24 Hrs  T(C): 36.8 (17 May 2018 05:37), Max: 37.2 (16 May 2018 09:45)  T(F): 98.3 (17 May 2018 05:37), Max: 99 (16 May 2018 09:45)  HR: 80 (17 May 2018 00:32) (70 - 93)  BP: 88/67 (17 May 2018 00:15) (85/65 - 103/79)  BP(mean): 74 (17 May 2018 00:15) (70 - 79)  ABP: --  ABP(mean): --  RR: 16 (17 May 2018 00:32) (16 - 20)  SpO2: 100% (17 May 2018 00:32) (80% - 100%)    Mode: standby    05-15 @ 07:01  -  05-16 @ 07:00  --------------------------------------------------------  IN: 700 mL / OUT: 0 mL / NET: 700 mL    05-16 @ 07:01  -  05-17 @ 06:22  --------------------------------------------------------  IN: 1000 mL / OUT: 2000 mL / NET: -1000 mL      CAPILLARY BLOOD GLUCOSE      POCT Blood Glucose.: 166 mg/dL (17 May 2018 00:25)      PHYSICAL EXAM:    General: NAD  HEENT: NC/AT; PERRL, clear conjunctiva  Neck: supple  Respiratory: CTA b/l  Cardiovascular: +S1/S2; RRR  Abdomen: soft, NT/ND; +BS x4  Extremities: WWP, 2+ peripheral pulses b/l; no LE edema  Skin: normal color and turgor; no rash  Neurological:     MEDICATIONS:  MEDICATIONS  (STANDING):  aspirin  chewable 81 milliGRAM(s) Oral daily  atorvastatin 80 milliGRAM(s) Oral at bedtime  chlorhexidine 0.12% Liquid 15 milliLiter(s) Swish and Spit two times a day  chlorhexidine 2% Cloths 1 Application(s) Topical daily  dextrose 5%. 1000 milliLiter(s) (50 mL/Hr) IV Continuous <Continuous>  dextrose 50% Injectable 12.5 Gram(s) IV Push once  dextrose 50% Injectable 25 Gram(s) IV Push once  dextrose 50% Injectable 25 Gram(s) IV Push once  ergocalciferol Drops 6000 Unit(s) Oral daily  escitalopram 5 milliGRAM(s) Oral daily  folic acid 1 milliGRAM(s) Oral daily  hydrocortisone 20 milliGRAM(s) Oral every 12 hours  insulin regular  human corrective regimen sliding scale   SubCutaneous every 6 hours  insulin regular  human recombinant 5 Unit(s) SubCutaneous every 6 hours  levETIRAcetam  Solution 500 milliGRAM(s) Oral every 24 hours  metoclopramide 5 milliGRAM(s) Oral every 8 hours  midodrine 15 milliGRAM(s) Oral every 8 hours  modafinil 100 milliGRAM(s) Oral daily  multivitamin 1 Tablet(s) Oral daily  pantoprazole   Suspension 40 milliGRAM(s) Oral daily  silver sulfADIAZINE 1% Cream 1 Application(s) Topical daily    MEDICATIONS  (PRN):  acetaminophen    Suspension. 650 milliGRAM(s) Oral every 6 hours PRN Moderate Pain (4 - 6)  acetaminophen  Suppository 650 milliGRAM(s) Rectal every 6 hours PRN For Temp greater than 38 C (100.4 F)  dextrose Gel 1 Dose(s) Oral once PRN Blood Glucose LESS THAN 70 milliGRAM(s)/deciliter  glucagon  Injectable 1 milliGRAM(s) IntraMuscular once PRN Glucose LESS THAN 70 milligrams/deciliter      ALLERGIES:  Allergies    No Known Allergies    Intolerances        LABS:                        8.9    13.1  )-----------( 145      ( 16 May 2018 06:56 )             30.2     05-17    133<L>  |  93<L>  |  39<H>  ----------------------------<  254<H>  4.2   |  28  |  2.41<H>    Ca    8.4      17 May 2018 02:52  Mg     2.1     05-16      PT/INR - ( 16 May 2018 06:56 )   PT: 24.0 sec;   INR: 2.13                RADIOLOGY & ADDITIONAL TESTS: Reviewed. INTERVAL HPI/OVERNIGHT EVENTS: Pt tolerated trach collar all night. Given 250 Keppra s/p HD. Tolerated HD w/o albumin and maintained pressures. Lost IV access, replaced IV access in R arm overnight. NSVT intermittently overnight, BMP sent to check lytes which were acceptable, found slight hyponatremia Na 133.     SUBJECTIVE: Patient seen and examined at bedside. Pt lethargic this morning, appears comfortable on trach collar. Awakens only to pain this morning and does not follow commands this morning.     OBJECTIVE:  VITAL SIGNS:  ICU Vital Signs Last 24 Hrs  T(C): 36.8 (17 May 2018 05:37), Max: 37.2 (16 May 2018 09:45)  T(F): 98.3 (17 May 2018 05:37), Max: 99 (16 May 2018 09:45)  HR: 80 (17 May 2018 00:32) (70 - 93)  BP: 88/67 (17 May 2018 00:15) (85/65 - 103/79)  BP(mean): 74 (17 May 2018 00:15) (70 - 79)  RR: 16 (17 May 2018 00:32) (16 - 20)  SpO2: 100% (17 May 2018 00:32) (80% - 100%)    Mode: standby    05-15 @ 07:01  -  05-16 @ 07:00  --------------------------------------------------------  IN: 700 mL / OUT: 0 mL / NET: 700 mL    05-16 @ 07:01  -  05-17 @ 06:22  --------------------------------------------------------  IN: 1000 mL / OUT: 2000 mL / NET: -1000 mL      CAPILLARY BLOOD GLUCOSE      POCT Blood Glucose.: 166 mg/dL (17 May 2018 00:25)      PHYSICAL EXAM:  General: cachectic male lying in bed, on trach collar, in NAD but lethargic and awakens only to pain  HEENT: NC/AT; pupils reactive; EOMI; anicteric sclerae with mild conjunctival injection  Neck: +trach collar in place, no purulent drainage or blood  Cardiovascular: +S1/S2; RRR  Respiratory: diminished breath sounds b/l; mild diffuse crackles; clear otherwise  Gastrointestinal: distended but soft; PEG in place, feeds running; no tenderness to palpation and bowel sounds present  Extremities: WWP; 2+ pitting edema to bilateral thighs; chronic venous stasis changes bilateral LE  Vascular: 2+ radial, DP pulses B/L  Neurological: lethargic, awakens only to pain. Does not follow commands today. pupils are reactive; no facial asymmetry  Skin: no rash; chronic venous stasis changes b/l lower extremities    MEDICATIONS:  MEDICATIONS  (STANDING):  aspirin  chewable 81 milliGRAM(s) Oral daily  atorvastatin 80 milliGRAM(s) Oral at bedtime  chlorhexidine 0.12% Liquid 15 milliLiter(s) Swish and Spit two times a day  chlorhexidine 2% Cloths 1 Application(s) Topical daily  dextrose 5%. 1000 milliLiter(s) (50 mL/Hr) IV Continuous <Continuous>  dextrose 50% Injectable 12.5 Gram(s) IV Push once  dextrose 50% Injectable 25 Gram(s) IV Push once  dextrose 50% Injectable 25 Gram(s) IV Push once  ergocalciferol Drops 6000 Unit(s) Oral daily  escitalopram 5 milliGRAM(s) Oral daily  folic acid 1 milliGRAM(s) Oral daily  hydrocortisone 20 milliGRAM(s) Oral every 12 hours  insulin regular  human corrective regimen sliding scale   SubCutaneous every 6 hours  insulin regular  human recombinant 5 Unit(s) SubCutaneous every 6 hours  levETIRAcetam  Solution 500 milliGRAM(s) Oral every 24 hours  metoclopramide 5 milliGRAM(s) Oral every 8 hours  midodrine 15 milliGRAM(s) Oral every 8 hours  modafinil 100 milliGRAM(s) Oral daily  multivitamin 1 Tablet(s) Oral daily  pantoprazole   Suspension 40 milliGRAM(s) Oral daily  silver sulfADIAZINE 1% Cream 1 Application(s) Topical daily    MEDICATIONS  (PRN):  acetaminophen    Suspension. 650 milliGRAM(s) Oral every 6 hours PRN Moderate Pain (4 - 6)  acetaminophen  Suppository 650 milliGRAM(s) Rectal every 6 hours PRN For Temp greater than 38 C (100.4 F)  dextrose Gel 1 Dose(s) Oral once PRN Blood Glucose LESS THAN 70 milliGRAM(s)/deciliter  glucagon  Injectable 1 milliGRAM(s) IntraMuscular once PRN Glucose LESS THAN 70 milligrams/deciliter      ALLERGIES:  Allergies    No Known Allergies    Intolerances        LABS:                        8.9    13.1  )-----------( 145      ( 16 May 2018 06:56 )             30.2     05-17    133<L>  |  93<L>  |  39<H>  ----------------------------<  254<H>  4.2   |  28  |  2.41<H>    Ca    8.4      17 May 2018 02:52  Mg     2.1     05-16      PT/INR - ( 16 May 2018 06:56 )   PT: 24.0 sec;   INR: 2.13         RADIOLOGY & ADDITIONAL TESTS: Reviewed.

## 2018-05-17 NOTE — PROGRESS NOTE ADULT - PROBLEM SELECTOR PLAN 5
Failure likely 2/2 to ischemic ATN with hypoperfusion in setting of shock. Baseline unknown but presenting Cr 3.93 with associated metabolic derangements. Started on HD during course with renal following. Permacath replaced multiple times over course for bacteremia and fungemia. Has R sided Permacath.   - for HD today off albumin, last HD on 5/14 with 2.5L removed Failure likely 2/2 to ischemic ATN with hypoperfusion in setting of shock. Baseline unknown but presenting Cr 3.93 with associated metabolic derangements. Started on HD during course with renal following. Permacath replaced multiple times over course for bacteremia and fungemia. Has R sided Permacath.   - HD yesterday, tolerated w/o albumin

## 2018-05-17 NOTE — PROGRESS NOTE ADULT - SUBJECTIVE AND OBJECTIVE BOX
HUNTER BRIZUELA   MRN-[id number]     (1955):     HPI:  62 yo M history of HFrEF 10-15% 2/2 ischemic cardiomyopathy, MI , s/p AICD vs PPM, ?Afib, Hypertension, Diabetes Mellitus Type 2 on insulin, CKD?and gout, who presents with a chief complaint of generalized weakness. Pt wad admitted to [facility] 2 months ago for gout and was sent to rehab. He was discharged home mid Feb with VNS. In the past 2 weeks patient has noted increased leg pain and weakness bilaterally. In the last few days, he has also experienced generalized weakness prompting him to come to ER.   In ER, noted to have t max of 102, SBP 70s, leukocytosis to 32.8, Lactate of 6.6, Acute renal failure with severe metabolic derangements. Given 3.5L fluids and Vanc/Zosyn. MICU consulted. (10 Mar 2018 18:51)    Pt known to palliative medicine earlier in this hospitalization when pt was initially in the MICU.  During that time, pt was critically ill  on life support (mech ventilation, CVVHD, pressors).  wavecatch met with pt's HCP, (dgt Cristal) to discuss overall goals of care, as pt was not trached or PEG'd yet.   Pt had minimal mental status. Pt was able to open eyes, follow simple commands intermittently.  Pt's dgt agreed to trach and vent as well as PEG to allow pt time to make an improvement as pt has suffered a CVA and his neurological prognosis was unclear. Palliative medicine signed off on .  The goal at that time was to allow pt to stabilize with a plan to transition to LTAC, when medically appropriate.  Pt's dgt was going to reassess pt's overall condition after some time...  Pt has remained in the hospital since .  He has been in (mostly) and out of ICU.  He remains vented thru his trach, he is on HD with high dose midodrine He currently does not require albumin to tolerate his HD sessions.  Pts mental status remains poor.    Palliative medicine will reconsult to discuss with pts dgt plan of care as pt has been hospitalized for months.     Interval History:  see above  In bed. awake.   on trach collar.  HD in progress.  attempting to continue HD without albumin. BS stable.    ROS:   nonverbal   Unable to attain ROS due to:  poor mental status                    Dyspnea (Heriberto 0-10):                       N/V (Y/N):                            Secretions (Y/N):              Agitation(Y/N):  Pain (Y/N):       -Provocation/Palliation:  -Quality/Quantity:  -Radiating:  -Severity:  -Timing/Frequency:  -Impact on ADLs:    Allergies:  No Known Allergies    Intolerances    Opiate Naive (Y/N):   yes  -iStop reviewed (Y/N):  yes ref # [id number]      MEDICATIONS  (STANDING):  aspirin  chewable 81 milliGRAM(s) Oral daily  atorvastatin 80 milliGRAM(s) Oral at bedtime  chlorhexidine 0.12% Liquid 15 milliLiter(s) Swish and Spit two times a day  chlorhexidine 2% Cloths 1 Application(s) Topical daily  dextrose 5%. 1000 milliLiter(s) (50 mL/Hr) IV Continuous <Continuous>  dextrose 50% Injectable 12.5 Gram(s) IV Push once  dextrose 50% Injectable 25 Gram(s) IV Push once  dextrose 50% Injectable 25 Gram(s) IV Push once  ergocalciferol Drops 6000 Unit(s) Oral daily  escitalopram 5 milliGRAM(s) Oral daily  folic acid 1 milliGRAM(s) Oral daily  hydrocortisone 20 milliGRAM(s) Oral every 12 hours  insulin regular  human corrective regimen sliding scale   SubCutaneous every 6 hours  insulin regular  human recombinant 5 Unit(s) SubCutaneous every 6 hours  levETIRAcetam  Solution 500 milliGRAM(s) Oral every 24 hours  metoclopramide 5 milliGRAM(s) Oral every 8 hours  midodrine 15 milliGRAM(s) Oral every 8 hours  modafinil 100 milliGRAM(s) Oral daily  multivitamin 1 Tablet(s) Oral daily  pantoprazole   Suspension 40 milliGRAM(s) Oral daily  silver sulfADIAZINE 1% Cream 1 Application(s) Topical daily  warfarin 2 milliGRAM(s) Oral once    MEDICATIONS  (PRN):  acetaminophen    Suspension. 650 milliGRAM(s) Oral every 6 hours PRN Moderate Pain (4 - 6)  acetaminophen  Suppository 650 milliGRAM(s) Rectal every 6 hours PRN For Temp greater than 38 C (100.4 F)  dextrose Gel 1 Dose(s) Oral once PRN Blood Glucose LESS THAN 70 milliGRAM(s)/deciliter  glucagon  Injectable 1 milliGRAM(s) IntraMuscular once PRN Glucose LESS THAN 70 milligrams/deciliter        Labs:                                     cbc                                       8.9    13.1  )-----------( 145      ( 16 May 2018 06:56 )             30.2         133<L>  |  93<L>  |  39<H>  ----------------------------<  254<H>  4.2   |  28  |  2.41<H>    Ca    8.4      17 May 2018 02:52  Mg     2.1             IMAGING:        < from: Xray Chest 1 View- PORTABLE-Routine (05.15.18 @ 07:45) >  EXAM:  XR CHEST PORTABLE ROUTINE 1V                          PROCEDURE DATE:  05/15/2018         INTERPRETATION:  Portable chest    History: Follow-up abnormal exam    Pleural effusions right more so than left, right venous catheter   left-sided implanted cardiac device and tracheostomy, unchanged compared   to prior exam 2018.    < end of copied text >        PEx:  Vital Signs Last 24 Hrs  T(C): 35.8 (17 May 2018 11:15), Max: 36.8 (17 May 2018 05:37)  T(F): 96.5 (17 May 2018 11:15), Max: 98.3 (17 May 2018 05:37)  HR: 89 (17 May 2018 11:15) (70 - 92)  BP: 88/68 (17 May 2018 11:15) (85/65 - 103/79)  BP(mean): 78 (17 May 2018 08:15) (70 - 79)  RR: 20 (17 May 2018 11:15) (16 - 20)  SpO2: 96% (17 May 2018 11:15) (80% - 100%)      General:  ill appearing, thin, not distressed , awake,   HEENT:  nc/at, thin,  temporal wasting, moist oral cavity  Neck:   supple, neg lymphadenopathy,  L IJ TLC, trach in place-- on trach collar  CVS:  AF with frequent ectopic beats,  pacer noted to  L chest wall, + R HD catheter, distant heart tones, generalized edema, hypotensive  Resp:  non-labored, + rhonchi  GI:   nondistended, soft,  +PEG tube in place  :   anuric  Musc:   weak, thin, no spontaneous movement  Ext: significant generalized peripheral edema  Neuro:  asleep  Psych:  maritza  Skin:  thin, dry, cool, + generalized edema although improving, pitting edema to the hips bilaterally  Lymph:  neg  Preadmit Karnofsky:   50 %           Current Karnofsky:   30    %  Cachexia (Y/N):   yes  BMI:  20.6  (in March, pts BMI was 22)    Advanced Directives:     DNR     HCP noted on chart     DPOA  (for finances)       Decision maker:  Pt does not have any ability to participate in medical decision making.  Legal surrogate:  pts dgt Cristal Castro [phone] is pts HCP    Social History:   single, lives by self, pt has a HHA 4hrs/day, 3 days per week.  Pt has 2 adult children (Cristal aged 37) and pt's son, is 24 and lives in [facility]. 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    He is retired but reports he is not on disability.   Pt is "spiritual" and practices a "Mormon" raquel background   Per notes, pt has not been compliant with his medication (not picked up prescription from his outpt pharmacy) since 2017.    GOALS OF CARE DISCUSSION:       Palliative care info/counseling provided-- following	           Family meeting-         Advanced Directives addressed please see Advance Care Planning Note-- 	           Documentation of GOC: DNR in case of cardiac arrest but employ other life prolonging treatments          REFERRALS:	        Palliative Med        Unit SW/Case Mgmt       - referred       Massage Therapy-- referred       Music Therapy-- referred       Nutrition-- following

## 2018-05-17 NOTE — PROGRESS NOTE ADULT - ASSESSMENT
63M PMH HFrEF 10-15% (ischemic), MI, s/p AICD vs PPM, possible Afib, HTN, DM2 on insulin, possible CKD, and gout, who presented with a chief complaint of generalized weakness (3/10/2018) s/p septic shock likely 2/2 LE cellulitis complicated by ATN requiring dialysis. Course complicated by AMS likely from brainstem infarct 2/2 LV thrombus vs toxic metabolic encephalopathy. Further complicated by development of candidemia and sepsis 2/2 klebsiella bacteremia s/p fluconazole and CTX, now resolved. s/p completed course of Zosyn for aspiration PNA.  Goals of care discussion for which patient made DNR, but with continued escalation of care. Continues to undergo management for respiratory failure with weaning as tolerated with CPAP, trach collar trials. With complications of hypotension, attempting to wean from albumin for HD. 63M PMH HFrEF 10-15% (ischemic), MI, s/p AICD vs PPM, possible Afib, HTN, DM2 on insulin, possible CKD, and gout, who presented with a chief complaint of generalized weakness (3/10/2018) s/p septic shock likely 2/2 LE cellulitis complicated by ATN requiring dialysis. Course complicated by AMS likely from brainstem infarct 2/2 LV thrombus vs toxic metabolic encephalopathy. Further complicated by development of candidemia and sepsis 2/2 klebsiella bacteremia s/p fluconazole and CTX, now resolved. s/p completed course of Zosyn for aspiration PNA.  Goals of care discussion for which patient made DNR, but with continued escalation of care. Continues to undergo management for respiratory failure with weaning as tolerated with CPAP, trach collar trials. With complications of hypotension, tolerated HD w/o albumin.

## 2018-05-17 NOTE — PROGRESS NOTE ADULT - PROBLEM SELECTOR PLAN 2
DM2 on Januvia at home. HbA1C 8.6. Has been on Lantus 42U qHS, insulin 7 units q6h. Difficulty controlling glucose levels with multiple episodes of hypoglycemia and hyperglycemia.   - placed on regular insulin 5U q DM2 on Januvia at home. HbA1C 8.6. Has been on Lantus 42U qHS, insulin 7 units q6h. Difficulty controlling glucose levels with multiple episodes of hypoglycemia and hyperglycemia.   - placed on regular insulin 5U q  - SSI   - sugars better controlled ~140s-180s DM2 on Januvia at home. HbA1C 8.6. Has been on Lantus 42U qHS, insulin 7 units q6h. Difficulty controlling glucose levels with multiple episodes of hypoglycemia and hyperglycemia.   - placed on regular insulin 5U q6  - SSI   - sugars better controlled in the last 24hrs ~140s-180s

## 2018-05-17 NOTE — PROGRESS NOTE ADULT - PROBLEM SELECTOR PLAN 7
Likely 2/2 CKD, No bloody output. Prior studies consistent with Anemia of chronic disease. Hgb stable at 8.9 today  - Keep active T&S, Monitor H/H Likely 2/2 CKD, No bloody output. Prior studies consistent with Anemia of chronic disease. Hgb stable.  - Keep active T&S, Monitor H/H

## 2018-05-17 NOTE — PROGRESS NOTE ADULT - PROBLEM SELECTOR PLAN 8
RESOLVED.  Presented initially with septic shock, has completed course of abx for bacteremia with Klebsiella as well as fungemia. Further complicated by septic shock 2/2 to aspiration with increasing O2 requirements while on 7Lach and transferred back to MICU. Weaned off pressors and back on 7La for which patient completed additional 10 day course of Zosyn.   - continue to monitor for signs and symptoms of infection  - WBC up to 13.1K today, continue to monitor    #Fungemia  RESOLVED. Noted to have Candida Tropicalis growing in blood cultures and completed fluconazole course. RESOLVED.  Presented initially with septic shock, has completed course of abx for bacteremia with Klebsiella as well as fungemia. Further complicated by septic shock 2/2 to aspiration with increasing O2 requirements while on 7Lach and transferred back to MICU. Weaned off pressors and back on 7La for which patient completed additional 10 day course of Zosyn.   - continue to monitor for signs and symptoms of infection  - WBC up to 13.1K yesterday, continue to monitor    #Fungemia  RESOLVED. Noted to have Candida Tropicalis growing in blood cultures and completed fluconazole course.

## 2018-05-17 NOTE — PROGRESS NOTE ADULT - PROBLEM SELECTOR PLAN 3
Treated for both septic as well as cardiogenic shock requiring pressors and inotropes. Subsequently weaned off, now on midodrine 15 mg TID. Has intermittently used albumin to tolerate dialysis. BP 80/60s.  - c/w Midodrine 15 mg q8h  - c/w hydrocortisone 20mg BID  - for HD today, f/u renal recs    #Adrenal Insufficiency  BP have been low with SBP in , with MAP> 60.  - c/w hydrocortisone 20 mg BID  - will attempt HD off albumin today Treated for both septic as well as cardiogenic shock requiring pressors and inotropes. Subsequently weaned off, now on midodrine 15 mg TID. Has intermittently used albumin to tolerate dialysis. BP 80/60s.  - c/w Midodrine 15 mg q8h  - c/w hydrocortisone 20mg BID  - tolerated HD w/o albumin, f/u renal recs    #Adrenal Insufficiency  BP have been low with SBP in , with MAP> 60.  - c/w hydrocortisone 20 mg BID  - weaning off albumin

## 2018-05-17 NOTE — PROGRESS NOTE ADULT - ATTENDING COMMENTS
will do HD again today for further UF as tolerates to optimize pulm status as still sig pulm congestion and fluid overload  pulm f/u re effusions

## 2018-05-17 NOTE — PROGRESS NOTE ADULT - PROBLEM SELECTOR PLAN 3
Patient's calcium 8.4 No albumin recently and phosphorus 6.4  Continue Hectorol  during dialysis   Low k/Low phos feeding

## 2018-05-17 NOTE — PROGRESS NOTE ADULT - PROBLEM SELECTOR PLAN 10
VTE: Coumadin 2mg, with INR daily checks, goal 2-3    GI PPx: PPI    DNR- Made DNR, family wants escalation of care, pressors if needed.  F: No IVF in setting of HD.  E: Replete electrolytes with caution in setting of HD.   N: Jevity 1.5 50cc/hr    Dispo: Likely LTAC, pending HD off albumin VTE: Coumadin 2mg, with INR daily checks, goal 2-3  GI PPx: PPI    DNR- Made DNR, family wants escalation of care, pressors if needed.  F: No IVF in setting of HD.  E: Replete electrolytes with caution in setting of HD.   N: Jevity 1.5 50cc/hr    Dispo: Likely LTAC once stabilized off albumin VTE: Coumadin 2mg tonight, with INR daily checks, goal 2-3  GI PPx: PPI    DNR- Made DNR, family wants escalation of care, pressors if needed.  F: No IVF in setting of HD.  E: Replete electrolytes with caution in setting of HD.   N: Jevity 1.5 50cc/hr    Dispo: Likely LTAC once stabilized off albumin

## 2018-05-17 NOTE — PROGRESS NOTE ADULT - PROBLEM SELECTOR PLAN 1
Acute respiratory failure in setting of septic and cardiogenic shock, with trach placed on 4/5/18 for persistent, now chronic respiratory failure. CXR showing effusions, tolerated HD 2 days ago 2.5L removed; for HD today trialing without albumin  - tolerating CPAP, will place on trach collar today and adjust as tolerated  - HD today, f/u renal recs Acute respiratory failure in setting of septic and cardiogenic shock, with trach placed on 4/5/18 for persistent, now chronic respiratory failure. CXR showing effusions, tolerated HD 2 days ago 2.5L removed; for HD today trialing without albumin  - tolerated trach collar overnight  - HD yesterday, tolerated w/o albumin, f/u renal recs Acute respiratory failure in setting of septic and cardiogenic shock, with trach placed on 4/5/18 for persistent, now chronic respiratory failure. CXR showing effusions, tolerated HD yesterday 2.5L removed w/o albumin.  - tolerated trach collar overnight  - HD yesterday, tolerated w/o albumin, f/u renal recs

## 2018-05-17 NOTE — PROGRESS NOTE ADULT - PROBLEM SELECTOR PLAN 1
Pt able to tolerate HD with albumin supplementation-- on HD currently  pt remains on midodrine 15mg q8h

## 2018-05-17 NOTE — PROGRESS NOTE ADULT - SUBJECTIVE AND OBJECTIVE BOX
Patient is a 63y Male seen and evaluated at bedside. Patient lying in bed in no acute distress now on trach collar and off ventilatory support. Last HD treatment  on 5/16 with 2.1 L UF. Chest xray with persistent effusion.      acetaminophen    Suspension. 650 every 6 hours PRN  acetaminophen  Suppository 650 every 6 hours PRN  aspirin  chewable 81 daily  atorvastatin 80 at bedtime  chlorhexidine 0.12% Liquid 15 two times a day  chlorhexidine 2% Cloths 1 daily  dextrose 5%. 1000 <Continuous>  dextrose 50% Injectable 12.5 once  dextrose 50% Injectable 25 once  dextrose 50% Injectable 25 once  dextrose Gel 1 once PRN  ergocalciferol Drops 6000 daily  escitalopram 5 daily  folic acid 1 daily  glucagon  Injectable 1 once PRN  hydrocortisone 20 every 12 hours  insulin regular  human corrective regimen sliding scale  every 6 hours  insulin regular  human recombinant 5 every 6 hours  levETIRAcetam  Solution 500 every 24 hours  metoclopramide 5 every 8 hours  midodrine 15 every 8 hours  modafinil 100 daily  multivitamin 1 daily  pantoprazole   Suspension 40 daily  silver sulfADIAZINE 1% Cream 1 daily      Allergies    No Known Allergies    Intolerances        T(C): , Max: 36.8 (05-17-18 @ 05:37)  T(F): , Max: 98.3 (05-17-18 @ 05:37)  HR: 89 (05-17-18 @ 11:15)  BP: 88/68 (05-17-18 @ 11:15)  BP(mean): 78 (05-17-18 @ 08:15)  RR: 20 (05-17-18 @ 11:15)  SpO2: 96% (05-17-18 @ 11:15)  Wt(kg): --    05-16 @ 07:01  -  05-17 @ 07:00  --------------------------------------------------------  IN: 1000 mL / OUT: 2000 mL / NET: -1000 mL    05-17 @ 07:01  -  05-17 @ 11:33  --------------------------------------------------------  IN: 250 mL / OUT: 0 mL / NET: 250 mL    Review of Systems:  Limited. Patient off ventilatory support on trach collar at present.    PHYSICAL EXAM:  GENERAL: Ill appearing, awake but confused, disoriented in no acute distress at present  HEAD:  Atraumatic, Normocephalic,   EYES: Bilateral conjuctival and scleral pallor+nt  Oral cavity: Oral mucosa moist and pale  NECK: Neck supple, Mild JVP, tracheostomy present.  CHEST/LUNG:  Bilateral decreased breath sounds, Bibasilar rales and crepitatiosn+nt, Right>left, no wheezing  HEART: Regular rate and rhythm. HOMER II/VI at LPSB, No gallop, no rub   ABDOMEN: Soft, Nontender, nondistended, PEG tube present. BS+nt, No flank tenderness.   EXTREMITIES: Bilateral thigh and leg edema++nt, No clubbing, cyanosis  Neurology: AAOx1, awake but confused, unable to follow verbal commands at present.   SKIN: No rashes or lesions    ACCESS: Right chest wall tunnel HD catheter with exit site with mild fibrotic changes.    LABS:                        8.9    13.1  )-----------( 145      ( 16 May 2018 06:56 )             30.2     05-17    133<L>  |  93<L>  |  39<H>  ----------------------------<  254<H>  4.2   |  28  |  2.41<H>    Ca    8.4      17 May 2018 02:52  Mg     2.1     05-16        PT/INR - ( 16 May 2018 06:56 )   PT: 24.0 sec;   INR: 2.13                    RADIOLOGY & ADDITIONAL STUDIES:  < from: Xray Chest 1 View- PORTABLE-Urgent (05.17.18 @ 09:20) >    EXAM:  XR CHEST PORTABLE URGENT 1V                          PROCEDURE DATE:  05/17/2018                     INTERPRETATION:  Portable chest    History: Follow-up abnormal exam shortness of breath    Pleural effusions right more so than left, tracheostomy tube left-sided   implanted cardiac device and right venous catheter grossly unchanged   compared to prior exam 5/16/2018.            "Thank you for the opportunity to participate in the care of this   patient."        HESHAM BERRIOS M.D., ATTENDING RADIOLOGIST  This document has been electronically signed. May 17 2018 11:32AM                  < end of copied text >

## 2018-05-17 NOTE — PROGRESS NOTE ADULT - PROBLEM SELECTOR PLAN 6
Hx of Afib and LV thrombus on Coumadin prior to admission. Most recent TTE with Definity shows no LV thrombus currently.   - Not currently on rate control as HR has remained stable but has received Lopressor pushes for HR goal <110  - Coumadin 2mg dosed for tonight. INR at 2.13 today, goal 2-3    #LV thrombus  LV thrombus noted on RUKHSANA on 4/10. Remains therapeutic on warfarin.  -INR at 2.19, redosed for 2mg coumadin with INR goal 2-3. Hx of Afib and LV thrombus on Coumadin prior to admission. Most recent TTE with Definity shows no LV thrombus currently.   - Not currently on rate control as HR has remained stable but has received Lopressor pushes for HR goal <110  - INR _____, goal 2-3.    #LV thrombus  LV thrombus noted on RUKHSANA on 4/10. Remains therapeutic on warfarin.  -INR at 2.19, redosed for 2mg coumadin with INR goal 2-3. Hx of Afib and LV thrombus on Coumadin prior to admission. Most recent TTE with Definity shows no LV thrombus currently.   - Not currently on rate control as HR has remained stable but has received Lopressor pushes for HR goal <110  - f/u INR @ 10AM, goal 2-3, and dose coumadin for this evening    #LV thrombus  LV thrombus noted on RUKHSANA on 4/10. Remains therapeutic on warfarin.  -f/u INR later this AM and redose for coumadin with INR goal 2-3 Hx of Afib and LV thrombus on Coumadin prior to admission. Most recent TTE with Definity shows no LV thrombus currently.   - Not currently on rate control as HR has remained stable but has received Lopressor pushes for HR goal <110  - INR today 1.26, goal 2-3, dosed Coumadin 2 this evening    #LV thrombus  LV thrombus noted on RUKHSANA on 4/10. Remains therapeutic on warfarin.  - INR 2.16, dosed 2 Coumadin for tonight

## 2018-05-17 NOTE — PROGRESS NOTE ADULT - PROBLEM SELECTOR PLAN 1
Acute kidney injury due to ischemic ATN requiring ongoing HD treatment via Right chest wall tunnel HD catheter.  Patient was last dialyzed 5/16 with UF of 2.1L   Volume status persistently hypervolemic and  K 4.2  Will schedule for HD treatment for UF and clearances today again  Low K/Low phos/renal diet

## 2018-05-17 NOTE — ADVANCED PRACTICE NURSE CONSULT - RECOMMEDATIONS
Recommend Collagenase to wound bed, spoke with MATT Rizvi and house staff. Pt on AirTap with wedges for repositioning.

## 2018-05-17 NOTE — PROGRESS NOTE ADULT - PROBLEM SELECTOR PLAN 4
IMPROVING, awake and following some commands, nodding yes/no to questions.  Noted to have change in mental status during admission, with concerns of encephalopathy likely multifactorial in setting of septic and cardiogenic shock as well as c/b brain stem infarct.   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 Per neurology may have had ischemic event from hypoperfusion. Also showing disc protrusion at C3/C4 level coursing superiorly to the C2/C3 contacting the ventral spinal cord.   - Neurology was previously following  - c/w Modafinil  - c/w Keppra 500 mg qd with extra 250 mg post HD days for seizures IMPROVING, awake and following some commands, nodding yes/no to questions prior to today.  Noted to have change in mental status during admission, with concerns of encephalopathy likely multifactorial in setting of septic and cardiogenic shock as well as c/b brain stem infarct.   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 Per neurology may have had ischemic event from hypoperfusion. Also showing disc protrusion at C3/C4 level coursing superiorly to the C2/C3 contacting the ventral spinal cord.   - Neurology was previously following  - c/w Modafinil  - c/w Keppra 500 mg qd with extra 250 mg post HD days for seizures

## 2018-05-17 NOTE — PROGRESS NOTE ADULT - ASSESSMENT
64 yo M history of HFrEF 10-15% 2/2 ischemic cardiomyopathy, MI , s/p AICD vs PPM, ?Afib, Hypertension, Diabetes Mellitus Type 2 on insulin, CKD and gout, who presents with a chief complaint of generalized weakness admitted for severe sepsis 2/2 LE cellulitis vs pneumonia   Pt has developed renal failure (GILMER) and was started on CVVHD, converted to HD but is hypotensive with same.    Pt has a neurological event on Friday March 23.  He was intubated for airway protection.  His mental status remains poor, off sedation.  No seizure.  No CVA.  s/p tracheostomy and ventilator to allow pts time to recover.    Pt has remained hospitalized over the past weeks, in and out of ICU, pt remains hemodynamically unstable at times. Pt remains HD dependent and ventilator dependent.     He remains chronically critically ill.   Over the  course of this week, pt has tolerated 1 HD treatment without albumin.  He is tolerating TC trials.

## 2018-05-17 NOTE — PROGRESS NOTE ADULT - SUBJECTIVE AND OBJECTIVE BOX
Patient was seen and evaluated on dialysis.   Patient is tolerating the procedure well.   HR: 89 (05-17-18 @ 11:15)  BP: 88/68 (05-17-18 @ 11:15)  Continue dialysis:   Dialyzer: Revaclear 400          QB: 350        QD: 500 3K+  Goal UF 2 to 2.5 kg over 3 Hours

## 2018-05-17 NOTE — CHART NOTE - NSCHARTNOTEFT_GEN_A_CORE
Admitting Diagnosis:   Patient is a 63y old  Male on HD, trach and PEG    PAST MEDICAL & SURGICAL HISTORY:  Type 2 diabetes mellitus with diabetic peripheral angiopathy without gangrene, with long-term curren  Essential hypertension, benign  Gout  Pacemaker  Chronic systolic heart failure  Myocardial infarction  No significant past surgical history      Current Nutrition Order: Jevity 1.5 @ 50mL/hr x 24 hours via G tube, Prostat BID (200kcal, 30g protein). Provides: 1200mL total voluume, 2000kcal, 107g protein, 912mL free water       PO Intake: Good (%) [   ]  Fair (50-75%) [   ] Poor (<25%) [   ]--N/A, enteral nutrition running at goal rate of 50mL/hr    GI Issues: No reports of N/V/D/C at this time    Pain: None reported    Skin Integrity: pressure ulcer stage III sacrum    Labs:       133<L>  |  93<L>  |  39<H>  ----------------------------<  254<H>  4.2   |  28  |  2.41<H>    Ca    8.4      17 May 2018 02:52  Mg     2.1           CAPILLARY BLOOD GLUCOSE      POCT Blood Glucose.: 235 mg/dL (17 May 2018 12:01)  POCT Blood Glucose.: 191 mg/dL (17 May 2018 06:48)  POCT Blood Glucose.: 166 mg/dL (17 May 2018 00:25)  POCT Blood Glucose.: 140 mg/dL (16 May 2018 18:42)  POCT Blood Glucose.: 198 mg/dL (16 May 2018 16:41)      Medications:  MEDICATIONS  (STANDING):  aspirin  chewable 81 milliGRAM(s) Oral daily  atorvastatin 80 milliGRAM(s) Oral at bedtime  chlorhexidine 0.12% Liquid 15 milliLiter(s) Swish and Spit two times a day  chlorhexidine 2% Cloths 1 Application(s) Topical daily  dextrose 5%. 1000 milliLiter(s) (50 mL/Hr) IV Continuous <Continuous>  dextrose 50% Injectable 12.5 Gram(s) IV Push once  dextrose 50% Injectable 25 Gram(s) IV Push once  dextrose 50% Injectable 25 Gram(s) IV Push once  ergocalciferol Drops 6000 Unit(s) Oral daily  escitalopram 5 milliGRAM(s) Oral daily  folic acid 1 milliGRAM(s) Oral daily  hydrocortisone 20 milliGRAM(s) Oral every 12 hours  insulin regular  human corrective regimen sliding scale   SubCutaneous every 6 hours  insulin regular  human recombinant 5 Unit(s) SubCutaneous every 6 hours  levETIRAcetam  Solution 500 milliGRAM(s) Oral every 24 hours  metoclopramide 5 milliGRAM(s) Oral every 8 hours  midodrine 15 milliGRAM(s) Oral every 8 hours  modafinil 100 milliGRAM(s) Oral daily  multivitamin 1 Tablet(s) Oral daily  pantoprazole   Suspension 40 milliGRAM(s) Oral daily  silver sulfADIAZINE 1% Cream 1 Application(s) Topical daily  warfarin 2 milliGRAM(s) Oral once    MEDICATIONS  (PRN):  acetaminophen    Suspension. 650 milliGRAM(s) Oral every 6 hours PRN Moderate Pain (4 - 6)  acetaminophen  Suppository 650 milliGRAM(s) Rectal every 6 hours PRN For Temp greater than 38 C (100.4 F)  dextrose Gel 1 Dose(s) Oral once PRN Blood Glucose LESS THAN 70 milliGRAM(s)/deciliter  glucagon  Injectable 1 milliGRAM(s) IntraMuscular once PRN Glucose LESS THAN 70 milligrams/deciliter      Weight: admission weight 74.7kg  Daily     Daily Weight in k.3 (17 May 2018 11:15)    Weight Change: Noted weight gain since admission, possibly due to fluid retention    Estimated energy needs:   Calories: 25-30kcal/kg (89)= 2225-2670kcal/day  Protein: 1.4-1.6g/kg= 125-142g/day  Fluid: per primary team    Subjective: Patient seen at bedside, enteral nutrition running at goal rate of 50mL/hr, with prostat meets 90% of calorie needs and 85% of protein needs. Pressure ulcer stage 3 continues to be documented. Energy needs reflect increase in protein for wound healing. Nutrition-focused physical findings reveal bitemporal wasting, clavicular wasting and buccal fat loss.    Previous Nutrition Diagnosis: Increased nutrient needs RT increased demand for protein as evidenced by pressure ulcer stage 3    Active [ X  ]  Resolved [   ]    If resolved, new PES:     Goal: Meet >80% of estimated energy needs via EN x 5 days    Recommendations: 1) Continue Jevity 1.5 @ 50mL/hr x 24 hours via G tube with Prostat BID (200kcal, 30g protein)  HOB >30-45 degrees    Education: Deferred, patient sleeping    Risk Level: High [  X ] Moderate [   ] Low [   ]

## 2018-05-18 LAB
ANION GAP SERPL CALC-SCNC: 15 MMOL/L — SIGNIFICANT CHANGE UP (ref 5–17)
BUN SERPL-MCNC: 37 MG/DL — HIGH (ref 7–23)
CALCIUM SERPL-MCNC: 8.7 MG/DL — SIGNIFICANT CHANGE UP (ref 8.4–10.5)
CHLORIDE SERPL-SCNC: 90 MMOL/L — LOW (ref 96–108)
CO2 SERPL-SCNC: 27 MMOL/L — SIGNIFICANT CHANGE UP (ref 22–31)
CREAT SERPL-MCNC: 2.44 MG/DL — HIGH (ref 0.5–1.3)
GLUCOSE BLDC GLUCOMTR-MCNC: 176 MG/DL — HIGH (ref 70–99)
GLUCOSE BLDC GLUCOMTR-MCNC: 197 MG/DL — HIGH (ref 70–99)
GLUCOSE BLDC GLUCOMTR-MCNC: 265 MG/DL — HIGH (ref 70–99)
GLUCOSE SERPL-MCNC: 283 MG/DL — HIGH (ref 70–99)
HCT VFR BLD CALC: 29.4 % — LOW (ref 39–50)
HGB BLD-MCNC: 8.9 G/DL — LOW (ref 13–17)
INR BLD: 2.07 — HIGH (ref 0.88–1.16)
MAGNESIUM SERPL-MCNC: 2 MG/DL — SIGNIFICANT CHANGE UP (ref 1.6–2.6)
MCHC RBC-ENTMCNC: 28.7 PG — SIGNIFICANT CHANGE UP (ref 27–34)
MCHC RBC-ENTMCNC: 30.3 G/DL — LOW (ref 32–36)
MCV RBC AUTO: 94.8 FL — SIGNIFICANT CHANGE UP (ref 80–100)
PHOSPHATE SERPL-MCNC: 4 MG/DL — SIGNIFICANT CHANGE UP (ref 2.5–4.5)
PLATELET # BLD AUTO: 139 K/UL — LOW (ref 150–400)
POTASSIUM SERPL-MCNC: 4.9 MMOL/L — SIGNIFICANT CHANGE UP (ref 3.5–5.3)
POTASSIUM SERPL-SCNC: 4.9 MMOL/L — SIGNIFICANT CHANGE UP (ref 3.5–5.3)
PROTHROM AB SERPL-ACNC: 23.3 SEC — HIGH (ref 9.8–12.7)
RBC # BLD: 3.1 M/UL — LOW (ref 4.2–5.8)
RBC # FLD: 16.7 % — SIGNIFICANT CHANGE UP (ref 10.3–16.9)
SODIUM SERPL-SCNC: 132 MMOL/L — LOW (ref 135–145)
WBC # BLD: 13 K/UL — HIGH (ref 3.8–10.5)
WBC # FLD AUTO: 13 K/UL — HIGH (ref 3.8–10.5)

## 2018-05-18 PROCEDURE — 99233 SBSQ HOSP IP/OBS HIGH 50: CPT | Mod: GC

## 2018-05-18 PROCEDURE — 71045 X-RAY EXAM CHEST 1 VIEW: CPT | Mod: 26

## 2018-05-18 PROCEDURE — 99232 SBSQ HOSP IP/OBS MODERATE 35: CPT | Mod: GC

## 2018-05-18 RX ORDER — WARFARIN SODIUM 2.5 MG/1
2.5 TABLET ORAL ONCE
Qty: 0 | Refills: 0 | Status: COMPLETED | OUTPATIENT
Start: 2018-05-18 | End: 2018-05-18

## 2018-05-18 RX ADMIN — Medication 1 MILLIGRAM(S): at 11:20

## 2018-05-18 RX ADMIN — Medication 5 MILLIGRAM(S): at 21:36

## 2018-05-18 RX ADMIN — MIDODRINE HYDROCHLORIDE 15 MILLIGRAM(S): 2.5 TABLET ORAL at 06:48

## 2018-05-18 RX ADMIN — CHLORHEXIDINE GLUCONATE 1 APPLICATION(S): 213 SOLUTION TOPICAL at 06:49

## 2018-05-18 RX ADMIN — Medication 1 APPLICATION(S): at 11:20

## 2018-05-18 RX ADMIN — Medication 1 TABLET(S): at 11:20

## 2018-05-18 RX ADMIN — WARFARIN SODIUM 2.5 MILLIGRAM(S): 2.5 TABLET ORAL at 21:36

## 2018-05-18 RX ADMIN — Medication 5 MILLIGRAM(S): at 06:48

## 2018-05-18 RX ADMIN — Medication 81 MILLIGRAM(S): at 11:20

## 2018-05-18 RX ADMIN — MIDODRINE HYDROCHLORIDE 15 MILLIGRAM(S): 2.5 TABLET ORAL at 21:52

## 2018-05-18 RX ADMIN — ATORVASTATIN CALCIUM 80 MILLIGRAM(S): 80 TABLET, FILM COATED ORAL at 21:38

## 2018-05-18 RX ADMIN — PANTOPRAZOLE SODIUM 40 MILLIGRAM(S): 20 TABLET, DELAYED RELEASE ORAL at 11:21

## 2018-05-18 RX ADMIN — Medication 1 APPLICATION(S): at 11:51

## 2018-05-18 RX ADMIN — INSULIN HUMAN 5 UNIT(S): 100 INJECTION, SOLUTION SUBCUTANEOUS at 06:49

## 2018-05-18 RX ADMIN — CHLORHEXIDINE GLUCONATE 15 MILLILITER(S): 213 SOLUTION TOPICAL at 21:36

## 2018-05-18 RX ADMIN — INSULIN HUMAN 5 UNIT(S): 100 INJECTION, SOLUTION SUBCUTANEOUS at 19:51

## 2018-05-18 RX ADMIN — ERGOCALCIFEROL 6000 UNIT(S): 1.25 CAPSULE ORAL at 11:21

## 2018-05-18 RX ADMIN — Medication 20 MILLIGRAM(S): at 09:20

## 2018-05-18 RX ADMIN — INSULIN HUMAN 2: 100 INJECTION, SOLUTION SUBCUTANEOUS at 19:51

## 2018-05-18 RX ADMIN — Medication 5 MILLIGRAM(S): at 13:27

## 2018-05-18 RX ADMIN — MODAFINIL 100 MILLIGRAM(S): 200 TABLET ORAL at 11:21

## 2018-05-18 RX ADMIN — INSULIN HUMAN 2: 100 INJECTION, SOLUTION SUBCUTANEOUS at 11:50

## 2018-05-18 RX ADMIN — MIDODRINE HYDROCHLORIDE 15 MILLIGRAM(S): 2.5 TABLET ORAL at 14:56

## 2018-05-18 RX ADMIN — CHLORHEXIDINE GLUCONATE 15 MILLILITER(S): 213 SOLUTION TOPICAL at 09:20

## 2018-05-18 RX ADMIN — LEVETIRACETAM 500 MILLIGRAM(S): 250 TABLET, FILM COATED ORAL at 14:57

## 2018-05-18 RX ADMIN — Medication 20 MILLIGRAM(S): at 21:36

## 2018-05-18 RX ADMIN — INSULIN HUMAN 5 UNIT(S): 100 INJECTION, SOLUTION SUBCUTANEOUS at 11:51

## 2018-05-18 RX ADMIN — ESCITALOPRAM OXALATE 5 MILLIGRAM(S): 10 TABLET, FILM COATED ORAL at 11:21

## 2018-05-18 RX ADMIN — INSULIN HUMAN 6: 100 INJECTION, SOLUTION SUBCUTANEOUS at 06:49

## 2018-05-18 NOTE — PROGRESS NOTE ADULT - PROBLEM SELECTOR PLAN 10
VTE: Coumadin 2mg tonight, with INR daily checks, goal 2-3  GI PPx: PPI    DNR- Made DNR, family wants escalation of care, pressors if needed.  F: No IVF in setting of HD.  E: Replete electrolytes with caution in setting of HD.   N: Jevity 1.5 50cc/hr    Dispo: Likely LTAC once stabilized off albumin VTE: Coumadin dosed nightly, with INR daily checks, goal 2-3  GI PPx: PPI  DNR- Made DNR, family wants escalation of care, pressors if needed.  F: No IVF in setting of HD  E: Replete electrolytes with caution in setting of HD  N: Jevity 1.5 50cc/hr  Dispo: Likely LTAC once stabilized off albumin

## 2018-05-18 NOTE — PROGRESS NOTE ADULT - PROBLEM SELECTOR PLAN 1
Acute respiratory failure in setting of septic and cardiogenic shock, with trach placed on 4/5/18 for persistent, now chronic respiratory failure. CXR showing effusions, tolerated HD yesterday 2.5L removed w/o albumin.  - tolerated trach collar overnight  - HD yesterday, tolerated w/o albumin, f/u renal recs

## 2018-05-18 NOTE — PROGRESS NOTE ADULT - PROBLEM SELECTOR PLAN 2
Patient's hgb 8.9 at present form 5/16  No iron deficiency   EPO during HD treatment.   Obtain repeat CBC

## 2018-05-18 NOTE — PROGRESS NOTE ADULT - ATTENDING COMMENTS
HD again today for further UF as still significant overload , pulm congestion and has been tolerating UF with HD

## 2018-05-18 NOTE — PROGRESS NOTE ADULT - ASSESSMENT
63M PMH HFrEF 10-15% (ischemic), MI, s/p AICD vs PPM, possible Afib, HTN, DM2 on insulin, possible CKD, and gout, who presented with a chief complaint of generalized weakness (3/10/2018) s/p septic shock likely 2/2 LE cellulitis complicated by ATN requiring dialysis. Course complicated by AMS likely from brainstem infarct 2/2 LV thrombus vs toxic metabolic encephalopathy. Further complicated by development of candidemia and sepsis 2/2 klebsiella bacteremia s/p fluconazole and CTX, now resolved. s/p completed course of Zosyn for aspiration PNA.  Goals of care discussion for which patient made DNR, but with continued escalation of care. Continues to undergo management for respiratory failure with weaning as tolerated with CPAP, trach collar trials. With complications of hypotension, tolerated HD w/o albumin. 63M PMH HFrEF 10-15% (ischemic), MI, s/p AICD vs PPM, possible Afib, HTN, DM2 on insulin, possible CKD, and gout, who presented with a chief complaint of generalized weakness (3/10/2018) s/p septic shock likely 2/2 LE cellulitis complicated by ATN requiring dialysis. Course complicated by AMS likely from brainstem infarct 2/2 LV thrombus vs toxic metabolic encephalopathy. Further complicated by development of candidemia and sepsis 2/2 klebsiella bacteremia s/p fluconazole and CTX, now resolved. s/p completed course of Zosyn for aspiration PNA.  Goals of care discussion for which patient made DNR, but with continued escalation of care. Continues to undergo management for respiratory failure with weaning as tolerated with CPAP, trach collar trials. With complications of hypotension, tolerating HD w/o albumin.

## 2018-05-18 NOTE — PROGRESS NOTE ADULT - SUBJECTIVE AND OBJECTIVE BOX
Patient was seen and evaluated on dialysis.   Patient is tolerating the procedure well.   HR: 98 (05-18-18 @ 15:36)  BP: 102/76 (05-18-18 @ 12:25)  Continue dialysis:   Dialyzer: Revaclear 400          QB:  350       QD: 500  Goal UF 2 kg over 2.5 Hours

## 2018-05-18 NOTE — PROGRESS NOTE ADULT - PROBLEM SELECTOR PLAN 8
RESOLVED.  Presented initially with septic shock, has completed course of abx for bacteremia with Klebsiella as well as fungemia. Further complicated by septic shock 2/2 to aspiration with increasing O2 requirements while on 7Lach and transferred back to MICU. Weaned off pressors and back on 7La for which patient completed additional 10 day course of Zosyn.   - continue to monitor for signs and symptoms of infection  - WBC up to 13.1K yesterday, continue to monitor    #Fungemia  RESOLVED. Noted to have Candida Tropicalis growing in blood cultures and completed fluconazole course. RESOLVED.  Presented initially with septic shock, has completed course of abx for bacteremia with Klebsiella as well as fungemia. Further complicated by septic shock 2/2 to aspiration with increasing O2 requirements while on 7Lach and transferred back to MICU. Weaned off pressors and back on 7La for which patient completed additional 10 day course of Zosyn.   - continue to monitor for signs and symptoms of infection  - monitor WBC    #Fungemia  RESOLVED. Noted to have Candida Tropicalis growing in blood cultures and completed fluconazole course.

## 2018-05-18 NOTE — PROGRESS NOTE ADULT - PROBLEM SELECTOR PLAN 4
IMPROVING, awake and following some commands, nodding yes/no to questions prior to today.  Noted to have change in mental status during admission, with concerns of encephalopathy likely multifactorial in setting of septic and cardiogenic shock as well as c/b brain stem infarct.   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 Per neurology may have had ischemic event from hypoperfusion. Also showing disc protrusion at C3/C4 level coursing superiorly to the C2/C3 contacting the ventral spinal cord.   - Neurology was previously following  - c/w Modafinil  - c/w Keppra 500 mg qd with extra 250 mg post HD days for seizures

## 2018-05-18 NOTE — PROGRESS NOTE ADULT - PROBLEM SELECTOR PLAN 3
Treated for both septic as well as cardiogenic shock requiring pressors and inotropes. Subsequently weaned off, now on midodrine 15 mg TID. Has intermittently used albumin to tolerate dialysis. BP 80/60s.  - c/w Midodrine 15 mg q8h  - c/w hydrocortisone 20mg BID  - tolerated HD w/o albumin, f/u renal recs    #Adrenal Insufficiency  BP have been low with SBP in , with MAP> 60.  - c/w hydrocortisone 20 mg BID  - weaning off albumin

## 2018-05-18 NOTE — PROGRESS NOTE ADULT - PROBLEM SELECTOR PLAN 4
Patient with severe systolic CHF with Fluid overload and right pleural efffusion  Fluid restriciton to <1L/day  Daily weight  Strict I/o  Optimize CHF treatment per cardiology/CHF team

## 2018-05-18 NOTE — PROGRESS NOTE ADULT - PROBLEM SELECTOR PLAN 2
DM2 on Januvia at home. HbA1C 8.6. Has been on Lantus 42U qHS, insulin 7 units q6h. Difficulty controlling glucose levels with multiple episodes of hypoglycemia and hyperglycemia.   - placed on regular insulin 5U q6  - SSI   - sugars better controlled in the last 24hrs ~140s-180s DM2 on Januvia at home. HbA1C 8.6. Difficulty controlling glucose levels with multiple episodes of hypoglycemia and hyperglycemia.   - placed on regular insulin 5U q6  - SSI   - sugars better controlled in the last 24hrs ~140s-180s

## 2018-05-18 NOTE — PROGRESS NOTE ADULT - PROBLEM SELECTOR PLAN 5
Failure likely 2/2 to ischemic ATN with hypoperfusion in setting of shock. Baseline unknown but presenting Cr 3.93 with associated metabolic derangements. Started on HD during course with renal following. Permacath replaced multiple times over course for bacteremia and fungemia. Has R sided Permacath.   - HD yesterday, tolerated w/o albumin

## 2018-05-18 NOTE — PROGRESS NOTE ADULT - PROBLEM SELECTOR PLAN 6
Hx of Afib and LV thrombus on Coumadin prior to admission. Most recent TTE with Definity shows no LV thrombus currently.   - Not currently on rate control as HR has remained stable but has received Lopressor pushes for HR goal <110  - INR today 1.26, goal 2-3, dosed Coumadin 2 this evening    #LV thrombus  LV thrombus noted on RUKHSANA on 4/10. Remains therapeutic on warfarin.  - INR 2.16, dosed 2 Coumadin for tonight Hx of Afib and LV thrombus on Coumadin prior to admission. Most recent TTE with Definity shows no LV thrombus currently.   - Not currently on rate control as HR has remained stable but has received Lopressor pushes for HR goal <110  - dose Coumadin based on INR    #LV thrombus  LV thrombus noted on RUKHSANA on 4/10. Remains therapeutic on warfarin.  - Dose Coumadin based on INR

## 2018-05-18 NOTE — PROGRESS NOTE ADULT - PROBLEM SELECTOR PLAN 9
CHF with EF 10-15% 2/2 ischemic cardiomyopathy s/p AICD. Elevated BNP on admission with R sided effusion and edema. Patient with persistent pleural effusions on CXR and US and b/l dependent edema. Assisted now with HD.  -HOLDING ACE/BB in setting of sepsis/ hypotension  -c/w midodrine  - anuric, therefore no Lasix/diuresis  - c/w HD for fluid balance    #CAD- Hx of MI and ischemic CM s/p AICD, STEMI 7/2017, was started on Aspirin and Brilinta.   - c/w aspirin 81 and Atorvastatin 80mg qhs. CHF with EF 10-15% 2/2 ischemic cardiomyopathy s/p AICD. Elevated BNP on admission with R sided effusion and edema. Patient with persistent pleural effusions on CXR and US and b/l dependent edema. Assisted now with HD.  -HOLDING ACE/BB in setting of sepsis/ hypotension  - c/w midodrine  - anuric, therefore no Lasix/diuresis  - c/w HD for fluid balance    #CAD- Hx of MI and ischemic CM s/p AICD, STEMI 7/2017, was started on Aspirin and Brilinta.   - c/w aspirin 81 and Atorvastatin 80mg qhs

## 2018-05-18 NOTE — PROGRESS NOTE ADULT - PROBLEM SELECTOR PLAN 7
Likely 2/2 CKD, No bloody output. Prior studies consistent with Anemia of chronic disease. Hgb stable.  - Keep active T&S, Monitor H/H

## 2018-05-18 NOTE — PROGRESS NOTE ADULT - SUBJECTIVE AND OBJECTIVE BOX
Patient is a 63y Male seen and evaluated at bedside. Patient lying in bed in no acute distress at present remains on trach collar. Last HD on 5/17 with 2.5L UF. Tolerated procedure well.       acetaminophen    Suspension. 650 every 6 hours PRN  acetaminophen  Suppository 650 every 6 hours PRN  aspirin  chewable 81 daily  atorvastatin 80 at bedtime  chlorhexidine 0.12% Liquid 15 two times a day  chlorhexidine 2% Cloths 1 daily  collagenase Ointment 1 daily  dextrose 5%. 1000 <Continuous>  dextrose 50% Injectable 12.5 once  dextrose 50% Injectable 25 once  dextrose 50% Injectable 25 once  dextrose Gel 1 once PRN  ergocalciferol Drops 6000 daily  escitalopram 5 daily  folic acid 1 daily  glucagon  Injectable 1 once PRN  hydrocortisone 20 every 12 hours  insulin regular  human corrective regimen sliding scale  every 6 hours  insulin regular  human recombinant 5 every 6 hours  levETIRAcetam  Solution 500 every 24 hours  metoclopramide 5 every 8 hours  midodrine 15 every 8 hours  modafinil 100 daily  multivitamin 1 daily  pantoprazole   Suspension 40 daily  silver sulfADIAZINE 1% Cream 1 daily  warfarin 2.5 once      Allergies    No Known Allergies    Intolerances        T(C): , Max: 37.1 (05-18-18 @ 06:00)  T(F): , Max: 98.8 (05-18-18 @ 06:00)  HR: 98 (05-18-18 @ 15:36)  BP: 102/76 (05-18-18 @ 12:25)  BP(mean): 85 (05-18-18 @ 12:25)  RR: 18 (05-18-18 @ 12:25)  SpO2: 98% (05-18-18 @ 15:36)  Wt(kg): --    05-17 @ 07:01  -  05-18 @ 07:00  --------------------------------------------------------  IN: 800 mL / OUT: 2500 mL / NET: -1700 mL    Review of Systems:  Limited. Patient off ventilatory support on trach collar at present.    PHYSICAL EXAM:  GENERAL: Ill appearing, awake but confused, disoriented in no acute distress at present  HEAD:  Atraumatic, Normocephalic,   EYES: Bilateral conjuctival and scleral pallor+nt  Oral cavity: Oral mucosa moist and pale  NECK: Neck supple, Mild JVP, tracheostomy present.  CHEST/LUNG:  Bilateral decreased breath sounds, Bibasilar rales and crepitatiosn+nt, Right>left, no wheezing  HEART: Regular rate and rhythm. HOMER II/VI at LPSB, No gallop, no rub   ABDOMEN: Soft, Nontender, nondistended, PEG tube present. BS+nt, No flank tenderness.   EXTREMITIES: Bilateral thigh and leg edema++nt, No clubbing, cyanosis  Neurology: AAOx1-2, awake and answers few verbal commands  SKIN: No rashes or lesions    ACCESS: Right chest wall tunnel HD catheter. no bleeding      LABS:    05-18    132<L>  |  90<L>  |  37<H>  ----------------------------<  283<H>  4.9   |  27  |  2.44<H>    Ca    8.7      18 May 2018 07:47  Phos  4.0     05-18  Mg     2.0     05-18        PT/INR - ( 18 May 2018 07:47 )   PT: 23.3 sec;   INR: 2.07                    RADIOLOGY & ADDITIONAL STUDIES:  < from: Xray Chest 1 View- PORTABLE-Routine (05.18.18 @ 06:03) >    EXAM:  XR CHEST PORTABLE ROUTINE 1V                          PROCEDURE DATE:  05/18/2018                     INTERPRETATION:  Portable chest    History: Follow-up abnormal exam pleural effusion    Pulmonary vascular congestion increasing compared to prior exam   5/17/2018. Bilateral pleural effusions again noted.            "Thank you for the opportunity to participate in the care of this   patient."        HESHAM BERRIOS M.D., ATTENDING RADIOLOGIST  This document has been electronically signed. May 18 2018 10:25AM                  < end of copied text >

## 2018-05-18 NOTE — PROGRESS NOTE ADULT - PROBLEM SELECTOR PLAN 1
Acute kidney injury due to ischemic ATN requiring ongoing HD treatment via Right chest wall tunnel HD catheter.  Patient was last dialyzed 5/17 with UF of 2.5L   Volume status persistently hypervolemic and  K 4.2  Will schedule for additional HD treatment for UF   Low K/Low phos/renal diet

## 2018-05-18 NOTE — PROGRESS NOTE ADULT - SUBJECTIVE AND OBJECTIVE BOX
OVERNIGHT EVENTS:    SUBJECTIVE / INTERVAL HPI: Patient seen and examined at bedside.     VITAL SIGNS:  Vital Signs Last 24 Hrs  T(C): 36.3 (18 May 2018 02:00), Max: 36.8 (17 May 2018 09:52)  T(F): 97.4 (18 May 2018 02:00), Max: 98.3 (17 May 2018 09:52)  HR: 101 (18 May 2018 00:47) (84 - 102)  BP: 127/83 (18 May 2018 00:28) (76/59 - 127/83)  BP(mean): 98 (18 May 2018 00:28) (64 - 98)  RR: 18 (18 May 2018 00:47) (16 - 20)  SpO2: 100% (18 May 2018 00:47) (96% - 100%)    PHYSICAL EXAM:    General: WDWN  HEENT: NC/AT; PERRL, anicteric sclera; MMM  Neck: supple  Cardiovascular: +S1/S2; RRR  Respiratory: CTA B/L; no W/R/R  Gastrointestinal: soft, NT/ND; +BSx4  Extremities: WWP; no edema, clubbing or cyanosis  Vascular: 2+ radial, DP/PT pulses B/L  Neurological: AAOx3; no focal deficits    MEDICATIONS:  MEDICATIONS  (STANDING):  aspirin  chewable 81 milliGRAM(s) Oral daily  atorvastatin 80 milliGRAM(s) Oral at bedtime  chlorhexidine 0.12% Liquid 15 milliLiter(s) Swish and Spit two times a day  chlorhexidine 2% Cloths 1 Application(s) Topical daily  collagenase Ointment 1 Application(s) Topical daily  dextrose 5%. 1000 milliLiter(s) (50 mL/Hr) IV Continuous <Continuous>  dextrose 50% Injectable 12.5 Gram(s) IV Push once  dextrose 50% Injectable 25 Gram(s) IV Push once  dextrose 50% Injectable 25 Gram(s) IV Push once  ergocalciferol Drops 6000 Unit(s) Oral daily  escitalopram 5 milliGRAM(s) Oral daily  folic acid 1 milliGRAM(s) Oral daily  hydrocortisone 20 milliGRAM(s) Oral every 12 hours  insulin regular  human corrective regimen sliding scale   SubCutaneous every 6 hours  insulin regular  human recombinant 5 Unit(s) SubCutaneous every 6 hours  levETIRAcetam  Solution 500 milliGRAM(s) Oral every 24 hours  metoclopramide 5 milliGRAM(s) Oral every 8 hours  midodrine 15 milliGRAM(s) Oral every 8 hours  modafinil 100 milliGRAM(s) Oral daily  multivitamin 1 Tablet(s) Oral daily  pantoprazole   Suspension 40 milliGRAM(s) Oral daily  silver sulfADIAZINE 1% Cream 1 Application(s) Topical daily    MEDICATIONS  (PRN):  acetaminophen    Suspension. 650 milliGRAM(s) Oral every 6 hours PRN Moderate Pain (4 - 6)  acetaminophen  Suppository 650 milliGRAM(s) Rectal every 6 hours PRN For Temp greater than 38 C (100.4 F)  dextrose Gel 1 Dose(s) Oral once PRN Blood Glucose LESS THAN 70 milliGRAM(s)/deciliter  glucagon  Injectable 1 milliGRAM(s) IntraMuscular once PRN Glucose LESS THAN 70 milligrams/deciliter      ALLERGIES:  Allergies    No Known Allergies    Intolerances        LABS:                        8.9    13.1  )-----------( 145      ( 16 May 2018 06:56 )             30.2     05-17    133<L>  |  93<L>  |  39<H>  ----------------------------<  254<H>  4.2   |  28  |  2.41<H>    Ca    8.4      17 May 2018 02:52  Mg     2.1     05-16      PT/INR - ( 17 May 2018 11:43 )   PT: 23.6 sec;   INR: 2.09              CAPILLARY BLOOD GLUCOSE      POCT Blood Glucose.: 172 mg/dL (17 May 2018 23:24)      RADIOLOGY & ADDITIONAL TESTS: Reviewed.    ASSESSMENT:    PLAN: OVERNIGHT EVENTS: 10 beats NSVT. Pressures maintained with MAPs>65. Was on trach collar all night, tolerated. No acute events.    SUBJECTIVE / INTERVAL HPI: Patient seen and examined at bedside. Patient sleepy this morning, responds to voice but gives minimal answers. Nods no to pain. Appears comfortable.     VITAL SIGNS:  Vital Signs Last 24 Hrs  T(C): 36.3 (18 May 2018 02:00), Max: 36.8 (17 May 2018 09:52)  T(F): 97.4 (18 May 2018 02:00), Max: 98.3 (17 May 2018 09:52)  HR: 101 (18 May 2018 00:47) (84 - 102)  BP: 127/83 (18 May 2018 00:28) (76/59 - 127/83)  BP(mean): 98 (18 May 2018 00:28) (64 - 98)  RR: 18 (18 May 2018 00:47) (16 - 20)  SpO2: 100% (18 May 2018 00:47) (96% - 100%)    PHYSICAL EXAM:  General: cachectic male lying in bed, on trach collar, in NAD, comfortable but sleepy and responds to verbal stimuli  HEENT: NC/AT; pupils reactive; EOMI; anicteric sclerae  Neck: +trach collar in place, no purulent drainage or blood  Cardiovascular: +S1/S2; RRR; no murmur  Respiratory: diminished breath sounds b/l; crackles improving, clear otherwise  Gastrointestinal: distended but soft; PEG in place, feeds running; no TTP, BSx4  Extremities: WWP; 2+ pitting edema to bilateral thighs; chronic venous stasis changes bilateral LE w/o erythema or warmth  Vascular: palpable peripheral pulses  Neurological: lethargic, awakens only to pain. Does not follow commands today. pupils are reactive; no facial asymmetry  Skin: no rash; chronic venous stasis changes b/l lower extremities    MEDICATIONS:  MEDICATIONS  (STANDING):  aspirin  chewable 81 milliGRAM(s) Oral daily  atorvastatin 80 milliGRAM(s) Oral at bedtime  chlorhexidine 0.12% Liquid 15 milliLiter(s) Swish and Spit two times a day  chlorhexidine 2% Cloths 1 Application(s) Topical daily  collagenase Ointment 1 Application(s) Topical daily  dextrose 5%. 1000 milliLiter(s) (50 mL/Hr) IV Continuous <Continuous>  dextrose 50% Injectable 12.5 Gram(s) IV Push once  dextrose 50% Injectable 25 Gram(s) IV Push once  dextrose 50% Injectable 25 Gram(s) IV Push once  ergocalciferol Drops 6000 Unit(s) Oral daily  escitalopram 5 milliGRAM(s) Oral daily  folic acid 1 milliGRAM(s) Oral daily  hydrocortisone 20 milliGRAM(s) Oral every 12 hours  insulin regular  human corrective regimen sliding scale   SubCutaneous every 6 hours  insulin regular  human recombinant 5 Unit(s) SubCutaneous every 6 hours  levETIRAcetam  Solution 500 milliGRAM(s) Oral every 24 hours  metoclopramide 5 milliGRAM(s) Oral every 8 hours  midodrine 15 milliGRAM(s) Oral every 8 hours  modafinil 100 milliGRAM(s) Oral daily  multivitamin 1 Tablet(s) Oral daily  pantoprazole   Suspension 40 milliGRAM(s) Oral daily  silver sulfADIAZINE 1% Cream 1 Application(s) Topical daily    MEDICATIONS  (PRN):  acetaminophen    Suspension. 650 milliGRAM(s) Oral every 6 hours PRN Moderate Pain (4 - 6)  acetaminophen  Suppository 650 milliGRAM(s) Rectal every 6 hours PRN For Temp greater than 38 C (100.4 F)  dextrose Gel 1 Dose(s) Oral once PRN Blood Glucose LESS THAN 70 milliGRAM(s)/deciliter  glucagon  Injectable 1 milliGRAM(s) IntraMuscular once PRN Glucose LESS THAN 70 milligrams/deciliter      ALLERGIES:  Allergies    No Known Allergies    Intolerances        LABS:                        8.9    13.1  )-----------( 145      ( 16 May 2018 06:56 )             30.2     05-17    133<L>  |  93<L>  |  39<H>  ----------------------------<  254<H>  4.2   |  28  |  2.41<H>    Ca    8.4      17 May 2018 02:52  Mg     2.1     05-16      PT/INR - ( 17 May 2018 11:43 )   PT: 23.6 sec;   INR: 2.09              CAPILLARY BLOOD GLUCOSE      POCT Blood Glucose.: 172 mg/dL (17 May 2018 23:24)      RADIOLOGY & ADDITIONAL TESTS: Reviewed. OVERNIGHT EVENTS: 10 beats NSVT. Pressures maintained with MAPs>65. Was on trach collar all night, tolerated. No acute events.    SUBJECTIVE / INTERVAL HPI: Patient seen and examined at bedside. Patient sleepy this morning, responds to voice but gives minimal answers. Nods no to pain. Appears comfortable.     VITAL SIGNS:  Vital Signs Last 24 Hrs  T(C): 36.3 (18 May 2018 02:00), Max: 36.8 (17 May 2018 09:52)  T(F): 97.4 (18 May 2018 02:00), Max: 98.3 (17 May 2018 09:52)  HR: 101 (18 May 2018 00:47) (84 - 102)  BP: 127/83 (18 May 2018 00:28) (76/59 - 127/83)  BP(mean): 98 (18 May 2018 00:28) (64 - 98)  RR: 18 (18 May 2018 00:47) (16 - 20)  SpO2: 100% (18 May 2018 00:47) (96% - 100%)    PHYSICAL EXAM:  General: cachectic male lying in bed, on trach collar, in NAD, comfortable but sleepy and responds to verbal stimuli  HEENT: NC/AT; pupils reactive; EOMI; anicteric sclerae  Neck: +trach collar in place, no purulent drainage or blood  Cardiovascular: +S1/S2; RRR; no murmur  Respiratory: diminished breath sounds b/l; crackles improving, clear otherwise  Gastrointestinal: distended but soft; PEG in place, feeds running; no TTP, BSx4  Extremities: WWP; 2+ pitting edema to bilateral thighs; chronic venous stasis changes bilateral LE w/o erythema or warmth  Vascular: palpable peripheral pulses  Neurological: lethargic, awakens only to pain. Does not follow commands today. pupils are reactive; no facial asymmetry  Skin: no rash; chronic venous stasis changes b/l lower extremities    MEDICATIONS:   MEDICATIONS  (STANDING):  aspirin  chewable 81 milliGRAM(s) Oral daily  atorvastatin 80 milliGRAM(s) Oral at bedtime  chlorhexidine 0.12% Liquid 15 milliLiter(s) Swish and Spit two times a day  chlorhexidine 2% Cloths 1 Application(s) Topical daily  collagenase Ointment 1 Application(s) Topical daily  dextrose 5%. 1000 milliLiter(s) (50 mL/Hr) IV Continuous <Continuous>  dextrose 50% Injectable 12.5 Gram(s) IV Push once  dextrose 50% Injectable 25 Gram(s) IV Push once  dextrose 50% Injectable 25 Gram(s) IV Push once  ergocalciferol Drops 6000 Unit(s) Oral daily  escitalopram 5 milliGRAM(s) Oral daily  folic acid 1 milliGRAM(s) Oral daily  hydrocortisone 20 milliGRAM(s) Oral every 12 hours  insulin regular  human corrective regimen sliding scale   SubCutaneous every 6 hours  insulin regular  human recombinant 5 Unit(s) SubCutaneous every 6 hours  levETIRAcetam  Solution 500 milliGRAM(s) Oral every 24 hours  metoclopramide 5 milliGRAM(s) Oral every 8 hours  midodrine 15 milliGRAM(s) Oral every 8 hours  modafinil 100 milliGRAM(s) Oral daily  multivitamin 1 Tablet(s) Oral daily  pantoprazole   Suspension 40 milliGRAM(s) Oral daily  silver sulfADIAZINE 1% Cream 1 Application(s) Topical daily    MEDICATIONS  (PRN):  acetaminophen    Suspension. 650 milliGRAM(s) Oral every 6 hours PRN Moderate Pain (4 - 6)  acetaminophen  Suppository 650 milliGRAM(s) Rectal every 6 hours PRN For Temp greater than 38 C (100.4 F)  dextrose Gel 1 Dose(s) Oral once PRN Blood Glucose LESS THAN 70 milliGRAM(s)/deciliter  glucagon  Injectable 1 milliGRAM(s) IntraMuscular once PRN Glucose LESS THAN 70 milligrams/deciliter      ALLERGIES:  Allergies    No Known Allergies    Intolerances        LABS:                        8.9    13.1  )-----------( 145      ( 16 May 2018 06:56 )             30.2     05-17    133<L>  |  93<L>  |  39<H>  ----------------------------<  254<H>  4.2   |  28  |  2.41<H>    Ca    8.4      17 May 2018 02:52  Mg     2.1     05-16      PT/INR - ( 17 May 2018 11:43 )   PT: 23.6 sec;   INR: 2.09              CAPILLARY BLOOD GLUCOSE      POCT Blood Glucose.: 172 mg/dL (17 May 2018 23:24)      RADIOLOGY & ADDITIONAL TESTS: Reviewed.

## 2018-05-19 LAB
ANION GAP SERPL CALC-SCNC: 14 MMOL/L — SIGNIFICANT CHANGE UP (ref 5–17)
BASE EXCESS BLDA CALC-SCNC: 1.8 MMOL/L — SIGNIFICANT CHANGE UP (ref -2–3)
BUN SERPL-MCNC: 42 MG/DL — HIGH (ref 7–23)
CALCIUM SERPL-MCNC: 8.6 MG/DL — SIGNIFICANT CHANGE UP (ref 8.4–10.5)
CHLORIDE SERPL-SCNC: 92 MMOL/L — LOW (ref 96–108)
CO2 SERPL-SCNC: 25 MMOL/L — SIGNIFICANT CHANGE UP (ref 22–31)
CREAT SERPL-MCNC: 2.9 MG/DL — HIGH (ref 0.5–1.3)
GAS PNL BLDA: SIGNIFICANT CHANGE UP
GLUCOSE BLDC GLUCOMTR-MCNC: 136 MG/DL — HIGH (ref 70–99)
GLUCOSE BLDC GLUCOMTR-MCNC: 156 MG/DL — HIGH (ref 70–99)
GLUCOSE BLDC GLUCOMTR-MCNC: 160 MG/DL — HIGH (ref 70–99)
GLUCOSE BLDC GLUCOMTR-MCNC: 224 MG/DL — HIGH (ref 70–99)
GLUCOSE BLDC GLUCOMTR-MCNC: 248 MG/DL — HIGH (ref 70–99)
GLUCOSE SERPL-MCNC: 266 MG/DL — HIGH (ref 70–99)
HCO3 BLDA-SCNC: 26 MMOL/L — SIGNIFICANT CHANGE UP (ref 21–28)
HCT VFR BLD CALC: 30.5 % — LOW (ref 39–50)
HGB BLD-MCNC: 9.1 G/DL — LOW (ref 13–17)
INR BLD: 2.02 — HIGH (ref 0.88–1.16)
MAGNESIUM SERPL-MCNC: 2.2 MG/DL — SIGNIFICANT CHANGE UP (ref 1.6–2.6)
MCHC RBC-ENTMCNC: 28 PG — SIGNIFICANT CHANGE UP (ref 27–34)
MCHC RBC-ENTMCNC: 29.8 G/DL — LOW (ref 32–36)
MCV RBC AUTO: 93.8 FL — SIGNIFICANT CHANGE UP (ref 80–100)
PCO2 BLDA: 37 MMHG — SIGNIFICANT CHANGE UP (ref 35–48)
PH BLDA: 7.46 — HIGH (ref 7.35–7.45)
PHOSPHATE SERPL-MCNC: 4.4 MG/DL — SIGNIFICANT CHANGE UP (ref 2.5–4.5)
PLATELET # BLD AUTO: 132 K/UL — LOW (ref 150–400)
PO2 BLDA: 75 MMHG — LOW (ref 83–108)
POTASSIUM SERPL-MCNC: 4.9 MMOL/L — SIGNIFICANT CHANGE UP (ref 3.5–5.3)
POTASSIUM SERPL-SCNC: 4.9 MMOL/L — SIGNIFICANT CHANGE UP (ref 3.5–5.3)
PROTHROM AB SERPL-ACNC: 22.7 SEC — HIGH (ref 9.8–12.7)
RBC # BLD: 3.25 M/UL — LOW (ref 4.2–5.8)
RBC # FLD: 16.6 % — SIGNIFICANT CHANGE UP (ref 10.3–16.9)
SAO2 % BLDA: 96 % — SIGNIFICANT CHANGE UP (ref 95–100)
SODIUM SERPL-SCNC: 131 MMOL/L — LOW (ref 135–145)
WBC # BLD: 13.4 K/UL — HIGH (ref 3.8–10.5)
WBC # FLD AUTO: 13.4 K/UL — HIGH (ref 3.8–10.5)

## 2018-05-19 PROCEDURE — 71045 X-RAY EXAM CHEST 1 VIEW: CPT | Mod: 26

## 2018-05-19 PROCEDURE — 99233 SBSQ HOSP IP/OBS HIGH 50: CPT | Mod: GC

## 2018-05-19 PROCEDURE — 99233 SBSQ HOSP IP/OBS HIGH 50: CPT

## 2018-05-19 RX ORDER — ERYTHROPOIETIN 10000 [IU]/ML
10000 INJECTION, SOLUTION INTRAVENOUS; SUBCUTANEOUS ONCE
Qty: 0 | Refills: 0 | Status: COMPLETED | OUTPATIENT
Start: 2018-05-19 | End: 2018-05-19

## 2018-05-19 RX ORDER — DOXERCALCIFEROL 2.5 UG/1
2 CAPSULE ORAL ONCE
Qty: 0 | Refills: 0 | Status: COMPLETED | OUTPATIENT
Start: 2018-05-19 | End: 2018-05-19

## 2018-05-19 RX ADMIN — Medication 20 MILLIGRAM(S): at 21:45

## 2018-05-19 RX ADMIN — INSULIN HUMAN 5 UNIT(S): 100 INJECTION, SOLUTION SUBCUTANEOUS at 00:58

## 2018-05-19 RX ADMIN — ESCITALOPRAM OXALATE 5 MILLIGRAM(S): 10 TABLET, FILM COATED ORAL at 12:01

## 2018-05-19 RX ADMIN — ATORVASTATIN CALCIUM 80 MILLIGRAM(S): 80 TABLET, FILM COATED ORAL at 21:45

## 2018-05-19 RX ADMIN — LEVETIRACETAM 500 MILLIGRAM(S): 250 TABLET, FILM COATED ORAL at 19:37

## 2018-05-19 RX ADMIN — Medication 5 MILLIGRAM(S): at 19:36

## 2018-05-19 RX ADMIN — INSULIN HUMAN 5 UNIT(S): 100 INJECTION, SOLUTION SUBCUTANEOUS at 06:20

## 2018-05-19 RX ADMIN — MIDODRINE HYDROCHLORIDE 15 MILLIGRAM(S): 2.5 TABLET ORAL at 19:35

## 2018-05-19 RX ADMIN — Medication 1 APPLICATION(S): at 20:15

## 2018-05-19 RX ADMIN — INSULIN HUMAN 5 UNIT(S): 100 INJECTION, SOLUTION SUBCUTANEOUS at 19:34

## 2018-05-19 RX ADMIN — ERYTHROPOIETIN 10000 UNIT(S): 10000 INJECTION, SOLUTION INTRAVENOUS; SUBCUTANEOUS at 18:46

## 2018-05-19 RX ADMIN — Medication 81 MILLIGRAM(S): at 12:01

## 2018-05-19 RX ADMIN — CHLORHEXIDINE GLUCONATE 15 MILLILITER(S): 213 SOLUTION TOPICAL at 09:32

## 2018-05-19 RX ADMIN — Medication 1 TABLET(S): at 12:01

## 2018-05-19 RX ADMIN — Medication 1 APPLICATION(S): at 05:39

## 2018-05-19 RX ADMIN — MIDODRINE HYDROCHLORIDE 15 MILLIGRAM(S): 2.5 TABLET ORAL at 05:40

## 2018-05-19 RX ADMIN — INSULIN HUMAN 5 UNIT(S): 100 INJECTION, SOLUTION SUBCUTANEOUS at 11:58

## 2018-05-19 RX ADMIN — INSULIN HUMAN 2: 100 INJECTION, SOLUTION SUBCUTANEOUS at 19:33

## 2018-05-19 RX ADMIN — Medication 5 MILLIGRAM(S): at 05:39

## 2018-05-19 RX ADMIN — ERGOCALCIFEROL 6000 UNIT(S): 1.25 CAPSULE ORAL at 12:15

## 2018-05-19 RX ADMIN — INSULIN HUMAN 4: 100 INJECTION, SOLUTION SUBCUTANEOUS at 06:20

## 2018-05-19 RX ADMIN — MODAFINIL 100 MILLIGRAM(S): 200 TABLET ORAL at 12:00

## 2018-05-19 RX ADMIN — INSULIN HUMAN 2: 100 INJECTION, SOLUTION SUBCUTANEOUS at 00:59

## 2018-05-19 RX ADMIN — INSULIN HUMAN 4: 100 INJECTION, SOLUTION SUBCUTANEOUS at 11:58

## 2018-05-19 RX ADMIN — Medication 20 MILLIGRAM(S): at 09:19

## 2018-05-19 RX ADMIN — DOXERCALCIFEROL 2 MICROGRAM(S): 2.5 CAPSULE ORAL at 18:46

## 2018-05-19 RX ADMIN — Medication 650 MILLIGRAM(S): at 23:56

## 2018-05-19 RX ADMIN — CHLORHEXIDINE GLUCONATE 1 APPLICATION(S): 213 SOLUTION TOPICAL at 05:39

## 2018-05-19 RX ADMIN — Medication 1 MILLIGRAM(S): at 12:01

## 2018-05-19 RX ADMIN — PANTOPRAZOLE SODIUM 40 MILLIGRAM(S): 20 TABLET, DELAYED RELEASE ORAL at 12:05

## 2018-05-19 RX ADMIN — CHLORHEXIDINE GLUCONATE 15 MILLILITER(S): 213 SOLUTION TOPICAL at 21:44

## 2018-05-19 NOTE — PROGRESS NOTE ADULT - PROBLEM SELECTOR PLAN 10
VTE: Coumadin dosed nightly, with INR daily checks, goal 2-3  GI PPx: PPI  DNR- Made DNR, family wants escalation of care, pressors if needed.  F: No IVF in setting of HD  E: Replete electrolytes with caution in setting of HD  N: Jevity 1.5 50cc/hr  Dispo: Likely LTAC once stabilized off albumin

## 2018-05-19 NOTE — PROGRESS NOTE ADULT - PROBLEM SELECTOR PLAN 4
Down from yesterday, not following commands.  Noted to have change in mental status during admission, with concerns of encephalopathy likely multifactorial in setting of septic and cardiogenic shock as well as c/b brain stem infarct.   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 Per neurology may have had ischemic event from hypoperfusion. Also showing disc protrusion at C3/C4 level coursing superiorly to the C2/C3 contacting the ventral spinal cord.   - Neurology was previously following  - c/w Modafinil  - c/w Keppra 500 mg qd with extra 250 mg post HD days for seizures

## 2018-05-19 NOTE — PROGRESS NOTE ADULT - SUBJECTIVE AND OBJECTIVE BOX
OVERNIGHT EVENTS: Tolerated trach collar overnight.     SUBJECTIVE / INTERVAL HPI: Patient seen and examined at bedside.     VITAL SIGNS:  Vital Signs Last 24 Hrs  T(C): 36.7 (19 May 2018 02:00), Max: 37.1 (18 May 2018 06:00)  T(F): 98 (19 May 2018 02:00), Max: 98.8 (18 May 2018 06:00)  HR: 100 (19 May 2018 04:10) (85 - 126)  BP: 84/65 (19 May 2018 00:15) (79/61 - 102/76)  BP(mean): 70 (19 May 2018 00:15) (66 - 85)  RR: 18 (19 May 2018 04:10) (17 - 19)  SpO2: 97% (19 May 2018 04:10) (97% - 100%)    PHYSICAL EXAM:    General: WDWN  HEENT: NC/AT; PERRL, anicteric sclera; MMM  Neck: supple  Cardiovascular: +S1/S2; RRR  Respiratory: CTA B/L; no W/R/R  Gastrointestinal: soft, NT/ND; +BSx4  Extremities: WWP; no edema, clubbing or cyanosis  Vascular: 2+ radial, DP/PT pulses B/L  Neurological: AAOx3; no focal deficits    MEDICATIONS:  MEDICATIONS  (STANDING):  aspirin  chewable 81 milliGRAM(s) Oral daily  atorvastatin 80 milliGRAM(s) Oral at bedtime  chlorhexidine 0.12% Liquid 15 milliLiter(s) Swish and Spit two times a day  chlorhexidine 2% Cloths 1 Application(s) Topical daily  collagenase Ointment 1 Application(s) Topical daily  dextrose 5%. 1000 milliLiter(s) (50 mL/Hr) IV Continuous <Continuous>  dextrose 50% Injectable 12.5 Gram(s) IV Push once  dextrose 50% Injectable 25 Gram(s) IV Push once  dextrose 50% Injectable 25 Gram(s) IV Push once  ergocalciferol Drops 6000 Unit(s) Oral daily  escitalopram 5 milliGRAM(s) Oral daily  folic acid 1 milliGRAM(s) Oral daily  hydrocortisone 20 milliGRAM(s) Oral every 12 hours  insulin regular  human corrective regimen sliding scale   SubCutaneous every 6 hours  insulin regular  human recombinant 5 Unit(s) SubCutaneous every 6 hours  levETIRAcetam  Solution 500 milliGRAM(s) Oral every 24 hours  metoclopramide 5 milliGRAM(s) Oral every 8 hours  midodrine 15 milliGRAM(s) Oral every 8 hours  modafinil 100 milliGRAM(s) Oral daily  multivitamin 1 Tablet(s) Oral daily  pantoprazole   Suspension 40 milliGRAM(s) Oral daily  silver sulfADIAZINE 1% Cream 1 Application(s) Topical daily    MEDICATIONS  (PRN):  acetaminophen    Suspension. 650 milliGRAM(s) Oral every 6 hours PRN Moderate Pain (4 - 6)  acetaminophen  Suppository 650 milliGRAM(s) Rectal every 6 hours PRN For Temp greater than 38 C (100.4 F)  dextrose Gel 1 Dose(s) Oral once PRN Blood Glucose LESS THAN 70 milliGRAM(s)/deciliter  glucagon  Injectable 1 milliGRAM(s) IntraMuscular once PRN Glucose LESS THAN 70 milligrams/deciliter      ALLERGIES:  Allergies    No Known Allergies    Intolerances        LABS:                        8.9    13.0  )-----------( 139      ( 18 May 2018 18:20 )             29.4     05-18    132<L>  |  90<L>  |  37<H>  ----------------------------<  283<H>  4.9   |  27  |  2.44<H>    Ca    8.7      18 May 2018 07:47  Phos  4.0     05-18  Mg     2.0     05-18      PT/INR - ( 18 May 2018 07:47 )   PT: 23.3 sec;   INR: 2.07              CAPILLARY BLOOD GLUCOSE      POCT Blood Glucose.: 156 mg/dL (19 May 2018 00:52)          RADIOLOGY & ADDITIONAL TESTS: Reviewed.      ASSESSMENT:    PLAN: OVERNIGHT EVENTS: Tolerated trach collar overnight.     SUBJECTIVE / INTERVAL HPI: Patient seen and examined at bedside. Patient sleeping, responds to painful stimuli but would not engage in interview.     VITAL SIGNS:  Vital Signs Last 24 Hrs  T(C): 36.7 (19 May 2018 02:00), Max: 37.1 (18 May 2018 06:00)  T(F): 98 (19 May 2018 02:00), Max: 98.8 (18 May 2018 06:00)  HR: 100 (19 May 2018 04:10) (85 - 126)  BP: 84/65 (19 May 2018 00:15) (79/61 - 102/76)  BP(mean): 70 (19 May 2018 00:15) (66 - 85)  RR: 18 (19 May 2018 04:10) (17 - 19)  SpO2: 97% (19 May 2018 04:10) (97% - 100%)    PHYSICAL EXAM:    General: Lying in bed on trach collar. Patient grimaces to painful stimuli but will not open eyes. Appears slightly Tachypneic   HEENT: NC/AT; PERRL, anicteric sclera; MMM  Neck: +trach collar in place, no purulent drainage or blood  Cardiovascular: +S1/S2; RRR; no murmur  Respiratory: diminished breath sounds b/l  Gastrointestinal: distended but soft; PEG in place, no drainage around site, feeds running; no TTP, BSx4  Extremities: WWP; 2+ pitting edema to bilateral thighs; chronic venous stasis changes bilateral LE w/o erythema or warmth  Vascular: palpable peripheral pulses  Neurological: lethargic, awakens only to pain. Does not follow commands today. pupils are reactive; no facial asymmetry  Skin: no rash; chronic venous stasis changes b/l lower extremities      MEDICATIONS:  MEDICATIONS  (STANDING):  aspirin  chewable 81 milliGRAM(s) Oral daily  atorvastatin 80 milliGRAM(s) Oral at bedtime  chlorhexidine 0.12% Liquid 15 milliLiter(s) Swish and Spit two times a day  chlorhexidine 2% Cloths 1 Application(s) Topical daily  collagenase Ointment 1 Application(s) Topical daily  dextrose 5%. 1000 milliLiter(s) (50 mL/Hr) IV Continuous <Continuous>  dextrose 50% Injectable 12.5 Gram(s) IV Push once  dextrose 50% Injectable 25 Gram(s) IV Push once  dextrose 50% Injectable 25 Gram(s) IV Push once  ergocalciferol Drops 6000 Unit(s) Oral daily  escitalopram 5 milliGRAM(s) Oral daily  folic acid 1 milliGRAM(s) Oral daily  hydrocortisone 20 milliGRAM(s) Oral every 12 hours  insulin regular  human corrective regimen sliding scale   SubCutaneous every 6 hours  insulin regular  human recombinant 5 Unit(s) SubCutaneous every 6 hours  levETIRAcetam  Solution 500 milliGRAM(s) Oral every 24 hours  metoclopramide 5 milliGRAM(s) Oral every 8 hours  midodrine 15 milliGRAM(s) Oral every 8 hours  modafinil 100 milliGRAM(s) Oral daily  multivitamin 1 Tablet(s) Oral daily  pantoprazole   Suspension 40 milliGRAM(s) Oral daily  silver sulfADIAZINE 1% Cream 1 Application(s) Topical daily    MEDICATIONS  (PRN):  acetaminophen    Suspension. 650 milliGRAM(s) Oral every 6 hours PRN Moderate Pain (4 - 6)  acetaminophen  Suppository 650 milliGRAM(s) Rectal every 6 hours PRN For Temp greater than 38 C (100.4 F)  dextrose Gel 1 Dose(s) Oral once PRN Blood Glucose LESS THAN 70 milliGRAM(s)/deciliter  glucagon  Injectable 1 milliGRAM(s) IntraMuscular once PRN Glucose LESS THAN 70 milligrams/deciliter      ALLERGIES:  Allergies    No Known Allergies    Intolerances          LABS                        9.1    13.4  )-----------( 132      ( 19 May 2018 06:09 )             30.5     05-19    131<L>  |  92<L>  |  42<H>  ----------------------------<  266<H>  4.9   |  25  |  2.90<H>    Ca    8.6      19 May 2018 06:09  Phos  4.4     05-19  Mg     2.2     05-19      PT/INR - ( 19 May 2018 06:09 )   PT: 22.7 sec;   INR: 2.02

## 2018-05-19 NOTE — PROGRESS NOTE ADULT - PROBLEM SELECTOR PLAN 9
CHF with EF 10-15% 2/2 ischemic cardiomyopathy s/p AICD. Elevated BNP on admission with R sided effusion and edema. Patient with persistent pleural effusions on CXR and US and b/l dependent edema. Assisted now with HD.  -HOLDING ACE/BB in setting of sepsis/ hypotension  - c/w midodrine  - anuric, therefore no Lasix/diuresis  - c/w HD for fluid balance    #CAD- Hx of MI and ischemic CM s/p AICD, STEMI 7/2017, was started on Aspirin and Brilinta.   - c/w aspirin 81 and Atorvastatin 80mg qhs

## 2018-05-19 NOTE — PROGRESS NOTE ADULT - PROBLEM SELECTOR PLAN 1
Acute kidney injury due to ischemic ATN requiring ongoing HD treatment via Right chest wall tunnel HD catheter.  Patient was last dialyzed 5/18 with UF of 2L UF only treatment.  Volume status persistently hypervolemic and  K 4.9  Will schedule for HD treatment as per schedule for UF and clearances.  Low K/Low phos/renal diet

## 2018-05-19 NOTE — PROGRESS NOTE ADULT - PROBLEM SELECTOR PLAN 6
Hx of Afib and LV thrombus on Coumadin prior to admission. Most recent TTE with Definity shows no LV thrombus currently.   - Not currently on rate control as HR has remained stable but has received Lopressor pushes for HR goal <110  - dose Coumadin based on INR    #LV thrombus  LV thrombus noted on RUKHSANA on 4/10. Remains therapeutic on warfarin.  - Dose Coumadin based on INR

## 2018-05-19 NOTE — PROGRESS NOTE ADULT - PROBLEM SELECTOR PLAN 2
DM2 on Januvia at home. HbA1C 8.6. Difficulty controlling glucose levels with multiple episodes of hypoglycemia and hyperglycemia.   - placed on regular insulin 5U q6  - SSI   - sugars better controlled in the last 24hrs ~140s-180s

## 2018-05-19 NOTE — PROGRESS NOTE ADULT - SUBJECTIVE AND OBJECTIVE BOX
Patient was seen and evaluated on dialysis.   Patient is tolerating the procedure well.   HR: 96 (05-19-18 @ 14:37)  BP: 91/59 (05-19-18 @ 11:15)  Continue dialysis:   Dialyzer: revaclear 400         QB:   350     QD: 500 2K+  Goal UF 2 to 2.5 kg over 3.5 Hours

## 2018-05-19 NOTE — PROGRESS NOTE ADULT - ASSESSMENT
63M PMH HFrEF 10-15% (ischemic), MI, s/p AICD vs PPM, possible Afib, HTN, DM2 on insulin, possible CKD, and gout, who presented with a chief complaint of generalized weakness (3/10/2018) s/p septic shock likely 2/2 LE cellulitis complicated by ATN requiring dialysis. Course complicated by AMS likely from brainstem infarct 2/2 LV thrombus vs toxic metabolic encephalopathy. Further complicated by development of candidemia and sepsis 2/2 klebsiella bacteremia s/p fluconazole and CTX, now resolved. s/p completed course of Zosyn for aspiration PNA.  Goals of care discussion for which patient made DNR, but with continued escalation of care. Continues to undergo management for respiratory failure with weaning as tolerated with CPAP, trach collar trials. With complications of hypotension, tolerating HD w/o albumin. Patient currently less responsive than yesterday but appears to be tolerating trach collar.

## 2018-05-19 NOTE — PROGRESS NOTE ADULT - ATTENDING COMMENTS
Patient examined, fellow's hx and PE reviewed and confirmed. I find GILMER, anemia, fluid overload. A/P reviewed and confirmed. Continue HD, epo PRN. See full note.

## 2018-05-19 NOTE — PROGRESS NOTE ADULT - SUBJECTIVE AND OBJECTIVE BOX
Patient is a 63y Male seen and evaluated at bedside. Patient lying in bed in no acute distress at present. Last HD on 5/18 with UF 2L. Tolerated procedure well. Chest xray still remain with pleural effusion.    acetaminophen    Suspension. 650 every 6 hours PRN  acetaminophen  Suppository 650 every 6 hours PRN  aspirin  chewable 81 daily  atorvastatin 80 at bedtime  chlorhexidine 0.12% Liquid 15 two times a day  chlorhexidine 2% Cloths 1 daily  collagenase Ointment 1 daily  dextrose 5%. 1000 <Continuous>  dextrose 50% Injectable 12.5 once  dextrose 50% Injectable 25 once  dextrose 50% Injectable 25 once  dextrose Gel 1 once PRN  doxercalciferol Injectable 2 once  epoetin amy Injectable 39202 once  ergocalciferol Drops 6000 daily  escitalopram 5 daily  folic acid 1 daily  glucagon  Injectable 1 once PRN  hydrocortisone 20 every 12 hours  insulin regular  human corrective regimen sliding scale  every 6 hours  insulin regular  human recombinant 5 every 6 hours  levETIRAcetam  Solution 500 every 24 hours  metoclopramide 5 every 8 hours  midodrine 15 every 8 hours  modafinil 100 daily  multivitamin 1 daily  pantoprazole   Suspension 40 daily  silver sulfADIAZINE 1% Cream 1 daily      Allergies    No Known Allergies    Intolerances        T(C): , Max: 37.1 (05-19-18 @ 06:00)  T(F): , Max: 98.8 (05-19-18 @ 06:00)  HR: 90 (05-19-18 @ 11:15)  BP: 91/59 (05-19-18 @ 11:15)  BP(mean): 71 (05-19-18 @ 11:15)  RR: 20 (05-19-18 @ 11:15)  SpO2: 100% (05-19-18 @ 11:15)  Wt(kg): --    05-18 @ 07:01  -  05-19 @ 07:00  --------------------------------------------------------  IN: 0 mL / OUT: 2000 mL / NET: -2000 mL    Review of Systems:  Limited. Patient off ventilatory support on trach collar at present.    PHYSICAL EXAM:  GENERAL: Ill appearing, awake but confused, disoriented in no acute distress at present  HEAD:  Atraumatic, Normocephalic,   EYES: Bilateral conjuctival and scleral pallor+nt  Oral cavity: Oral mucosa moist and pale  NECK: Neck supple, Mild JVP, tracheostomy present.  CHEST/LUNG:  Bilateral decreased breath sounds, Bibasilar rales and crepitatiosn+nt, Right>left, no wheezing  HEART: Regular rate and rhythm. HOMER II/VI at LPSB, No gallop, no rub   ABDOMEN: Soft, Nontender, nondistended, PEG tube present. BS+nt, No flank tenderness.   EXTREMITIES: Bilateral thigh and leg edema++nt, No clubbing, cyanosis  Neurology: AAOx1-2, awake and answers few verbal commands  SKIN: No rashes or lesions      ACCESS: Right chest wall tunnel HD catheter.    LABS:                        9.1    13.4  )-----------( 132      ( 19 May 2018 06:09 )             30.5     05-19    131<L>  |  92<L>  |  42<H>  ----------------------------<  266<H>  4.9   |  25  |  2.90<H>    Ca    8.6      19 May 2018 06:09  Phos  4.4     05-19  Mg     2.2     05-19        PT/INR - ( 19 May 2018 06:09 )   PT: 22.7 sec;   INR: 2.02                    RADIOLOGY & ADDITIONAL STUDIES:  < from: Xray Chest 1 View- PORTABLE-Routine (05.19.18 @ 06:23) >    EXAM:  XR CHEST PORTABLE ROUTINE 1V                          PROCEDURE DATE:  05/19/2018                     INTERPRETATION:  Clinical History: Abnormal chest sounds    Portable examination chest demonstrates cardiomegaly. Bilateral   effusions. Congestion and/or infiltrates cannot be excluded. No interval   change position remaining support devices in comparison to prior   examination of the chest 5/18/2018.    Impression: No interval change lung pathology            "Thank you for the opportunity to participate in the care of this   patient."        RADHA MAXWELL M.D., ATTENDING RADIOLOGIST  This document has been electronically signed. May 19 2018 10:20AM                  < end of copied text >

## 2018-05-19 NOTE — PROGRESS NOTE ADULT - PROBLEM SELECTOR PLAN 8
RESOLVED.  Presented initially with septic shock, has completed course of abx for bacteremia with Klebsiella as well as fungemia. Further complicated by septic shock 2/2 to aspiration with increasing O2 requirements while on 7Lach and transferred back to MICU. Weaned off pressors and back on 7La for which patient completed additional 10 day course of Zosyn.   - continue to monitor for signs and symptoms of infection  - monitor WBC    #Fungemia  RESOLVED. Noted to have Candida Tropicalis growing in blood cultures and completed fluconazole course.

## 2018-05-20 LAB
ANION GAP SERPL CALC-SCNC: 16 MMOL/L — SIGNIFICANT CHANGE UP (ref 5–17)
BUN SERPL-MCNC: 35 MG/DL — HIGH (ref 7–23)
CALCIUM SERPL-MCNC: 8.6 MG/DL — SIGNIFICANT CHANGE UP (ref 8.4–10.5)
CHLORIDE SERPL-SCNC: 92 MMOL/L — LOW (ref 96–108)
CO2 SERPL-SCNC: 25 MMOL/L — SIGNIFICANT CHANGE UP (ref 22–31)
CREAT SERPL-MCNC: 2.38 MG/DL — HIGH (ref 0.5–1.3)
GLUCOSE BLDC GLUCOMTR-MCNC: 192 MG/DL — HIGH (ref 70–99)
GLUCOSE BLDC GLUCOMTR-MCNC: 208 MG/DL — HIGH (ref 70–99)
GLUCOSE BLDC GLUCOMTR-MCNC: 248 MG/DL — HIGH (ref 70–99)
GLUCOSE SERPL-MCNC: 224 MG/DL — HIGH (ref 70–99)
GRAM STN FLD: SIGNIFICANT CHANGE UP
HCT VFR BLD CALC: 29.8 % — LOW (ref 39–50)
HGB BLD-MCNC: 9.1 G/DL — LOW (ref 13–17)
INR BLD: 2.16 — HIGH (ref 0.88–1.16)
MAGNESIUM SERPL-MCNC: 1.9 MG/DL — SIGNIFICANT CHANGE UP (ref 1.6–2.6)
MCHC RBC-ENTMCNC: 27.6 PG — SIGNIFICANT CHANGE UP (ref 27–34)
MCHC RBC-ENTMCNC: 30.5 G/DL — LOW (ref 32–36)
MCV RBC AUTO: 90.3 FL — SIGNIFICANT CHANGE UP (ref 80–100)
PHOSPHATE SERPL-MCNC: 3.3 MG/DL — SIGNIFICANT CHANGE UP (ref 2.5–4.5)
PLATELET # BLD AUTO: 133 K/UL — LOW (ref 150–400)
POTASSIUM SERPL-MCNC: 4.3 MMOL/L — SIGNIFICANT CHANGE UP (ref 3.5–5.3)
POTASSIUM SERPL-SCNC: 4.3 MMOL/L — SIGNIFICANT CHANGE UP (ref 3.5–5.3)
PROTHROM AB SERPL-ACNC: 24.3 SEC — HIGH (ref 9.8–12.7)
RBC # BLD: 3.3 M/UL — LOW (ref 4.2–5.8)
RBC # FLD: 16.7 % — SIGNIFICANT CHANGE UP (ref 10.3–16.9)
SODIUM SERPL-SCNC: 133 MMOL/L — LOW (ref 135–145)
SPECIMEN SOURCE: SIGNIFICANT CHANGE UP
WBC # BLD: 15.7 K/UL — HIGH (ref 3.8–10.5)
WBC # FLD AUTO: 15.7 K/UL — HIGH (ref 3.8–10.5)

## 2018-05-20 PROCEDURE — 99233 SBSQ HOSP IP/OBS HIGH 50: CPT

## 2018-05-20 PROCEDURE — 71045 X-RAY EXAM CHEST 1 VIEW: CPT | Mod: 26

## 2018-05-20 PROCEDURE — 99233 SBSQ HOSP IP/OBS HIGH 50: CPT | Mod: GC

## 2018-05-20 RX ORDER — WARFARIN SODIUM 2.5 MG/1
2 TABLET ORAL ONCE
Qty: 0 | Refills: 0 | Status: COMPLETED | OUTPATIENT
Start: 2018-05-20 | End: 2018-05-20

## 2018-05-20 RX ORDER — IPRATROPIUM/ALBUTEROL SULFATE 18-103MCG
3 AEROSOL WITH ADAPTER (GRAM) INHALATION ONCE
Qty: 0 | Refills: 0 | Status: COMPLETED | OUTPATIENT
Start: 2018-05-20 | End: 2018-05-20

## 2018-05-20 RX ADMIN — PANTOPRAZOLE SODIUM 40 MILLIGRAM(S): 20 TABLET, DELAYED RELEASE ORAL at 12:11

## 2018-05-20 RX ADMIN — Medication 81 MILLIGRAM(S): at 12:09

## 2018-05-20 RX ADMIN — Medication 1 APPLICATION(S): at 12:33

## 2018-05-20 RX ADMIN — INSULIN HUMAN 5 UNIT(S): 100 INJECTION, SOLUTION SUBCUTANEOUS at 12:31

## 2018-05-20 RX ADMIN — INSULIN HUMAN 4: 100 INJECTION, SOLUTION SUBCUTANEOUS at 06:04

## 2018-05-20 RX ADMIN — INSULIN HUMAN 2: 100 INJECTION, SOLUTION SUBCUTANEOUS at 12:30

## 2018-05-20 RX ADMIN — Medication 5 MILLIGRAM(S): at 12:07

## 2018-05-20 RX ADMIN — MODAFINIL 100 MILLIGRAM(S): 200 TABLET ORAL at 12:10

## 2018-05-20 RX ADMIN — MIDODRINE HYDROCHLORIDE 15 MILLIGRAM(S): 2.5 TABLET ORAL at 03:26

## 2018-05-20 RX ADMIN — MIDODRINE HYDROCHLORIDE 15 MILLIGRAM(S): 2.5 TABLET ORAL at 12:25

## 2018-05-20 RX ADMIN — INSULIN HUMAN 5 UNIT(S): 100 INJECTION, SOLUTION SUBCUTANEOUS at 06:04

## 2018-05-20 RX ADMIN — ATORVASTATIN CALCIUM 80 MILLIGRAM(S): 80 TABLET, FILM COATED ORAL at 22:05

## 2018-05-20 RX ADMIN — CHLORHEXIDINE GLUCONATE 15 MILLILITER(S): 213 SOLUTION TOPICAL at 12:18

## 2018-05-20 RX ADMIN — INSULIN HUMAN 5 UNIT(S): 100 INJECTION, SOLUTION SUBCUTANEOUS at 18:29

## 2018-05-20 RX ADMIN — Medication 5 MILLIGRAM(S): at 03:26

## 2018-05-20 RX ADMIN — Medication 1 MILLIGRAM(S): at 12:09

## 2018-05-20 RX ADMIN — CHLORHEXIDINE GLUCONATE 1 APPLICATION(S): 213 SOLUTION TOPICAL at 05:27

## 2018-05-20 RX ADMIN — Medication 1 APPLICATION(S): at 06:04

## 2018-05-20 RX ADMIN — ERGOCALCIFEROL 6000 UNIT(S): 1.25 CAPSULE ORAL at 12:18

## 2018-05-20 RX ADMIN — Medication 650 MILLIGRAM(S): at 00:45

## 2018-05-20 RX ADMIN — Medication 1 TABLET(S): at 12:10

## 2018-05-20 RX ADMIN — MIDODRINE HYDROCHLORIDE 15 MILLIGRAM(S): 2.5 TABLET ORAL at 18:30

## 2018-05-20 RX ADMIN — Medication 20 MILLIGRAM(S): at 12:10

## 2018-05-20 RX ADMIN — ESCITALOPRAM OXALATE 5 MILLIGRAM(S): 10 TABLET, FILM COATED ORAL at 12:25

## 2018-05-20 RX ADMIN — Medication 5 MILLIGRAM(S): at 18:29

## 2018-05-20 RX ADMIN — Medication 650 MILLIGRAM(S): at 17:07

## 2018-05-20 RX ADMIN — Medication 3 MILLILITER(S): at 11:58

## 2018-05-20 RX ADMIN — WARFARIN SODIUM 2 MILLIGRAM(S): 2.5 TABLET ORAL at 22:05

## 2018-05-20 RX ADMIN — Medication 650 MILLIGRAM(S): at 16:37

## 2018-05-20 RX ADMIN — INSULIN HUMAN 5 UNIT(S): 100 INJECTION, SOLUTION SUBCUTANEOUS at 00:05

## 2018-05-20 RX ADMIN — CHLORHEXIDINE GLUCONATE 15 MILLILITER(S): 213 SOLUTION TOPICAL at 22:04

## 2018-05-20 RX ADMIN — INSULIN HUMAN 4: 100 INJECTION, SOLUTION SUBCUTANEOUS at 18:29

## 2018-05-20 RX ADMIN — LEVETIRACETAM 500 MILLIGRAM(S): 250 TABLET, FILM COATED ORAL at 16:35

## 2018-05-20 NOTE — PROGRESS NOTE ADULT - ATTENDING COMMENTS
Patient examined, fellow's hx and PE reviewed and confirmed. I find GILMER, hypotension, anemia. A/P reviewed and confirmed. Continue HD. Continue midodrine. See full note.

## 2018-05-20 NOTE — PROGRESS NOTE ADULT - PROBLEM SELECTOR PLAN 6
Hx of Afib and LV thrombus on Coumadin prior to admission. Most recent TTE with Definity shows no LV thrombus currently.   - Not currently on rate control as HR has remained stable but has received Lopressor pushes for HR goal <110  - dose Coumadin based on INR    #LV thrombus  LV thrombus noted on RUKHSANA on 4/10. Remains therapeutic on warfarin.  - Dose Coumadin based on INR Hx of Afib and LV thrombus on Coumadin prior to admission. Most recent TTE with Definity shows no LV thrombus currently.   - Not currently on rate control as HR has remained stable but has received Lopressor pushes for HR goal <110  - dose Coumadin based on INR, dosed 2mg for HS for INR 2.16 today    #LV thrombus  LV thrombus noted on RUKHSANA on 4/10. Remains therapeutic on warfarin.  - Dose Coumadin based on INR, dosed 2mg for HS for INR 2.16 today

## 2018-05-20 NOTE — PROGRESS NOTE ADULT - PROBLEM SELECTOR PLAN 5
Failure likely 2/2 to ischemic ATN with hypoperfusion in setting of shock. Baseline unknown but presenting Cr 3.93 with associated metabolic derangements. Started on HD during course with renal following. Permacath replaced multiple times over course for bacteremia and fungemia. Has R sided Permacath.   - HD yesterday, tolerated w/o albumin Failure likely 2/2 to ischemic ATN with hypoperfusion in setting of shock. Baseline unknown but presenting Cr 3.93 with associated metabolic derangements. Started on HD during course with renal following. Permacath replaced multiple times over course for bacteremia and fungemia. Has R sided Permacath.   - HD 5/19, tolerated w/o albumin

## 2018-05-20 NOTE — PROGRESS NOTE ADULT - PROBLEM SELECTOR PLAN 2
Patient's hgb 9.1 at present  No iron deficiency   EPO during HD treatment.   Transfuse PRBC per primary team as indicated.

## 2018-05-20 NOTE — PROGRESS NOTE ADULT - SUBJECTIVE AND OBJECTIVE BOX
Patient is a 63y Male seen and evaluated at bedside. Patient lying in bed in no acute distress at present now on ventilatory support with increased pulmonary secretions. Last HD on 5/19 with 2.3 L UF. Tolerated procedure well. Chest xray with improving effusion.       acetaminophen    Suspension. 650 every 6 hours PRN  acetaminophen  Suppository 650 every 6 hours PRN  ALBUTerol/ipratropium for Nebulization 3 once  aspirin  chewable 81 daily  atorvastatin 80 at bedtime  chlorhexidine 0.12% Liquid 15 two times a day  chlorhexidine 2% Cloths 1 daily  collagenase Ointment 1 daily  dextrose 5%. 1000 <Continuous>  dextrose 50% Injectable 12.5 once  dextrose 50% Injectable 25 once  dextrose 50% Injectable 25 once  dextrose Gel 1 once PRN  ergocalciferol Drops 6000 daily  escitalopram 5 daily  folic acid 1 daily  glucagon  Injectable 1 once PRN  hydrocortisone 20 every 12 hours  insulin regular  human corrective regimen sliding scale  every 6 hours  insulin regular  human recombinant 5 every 6 hours  levETIRAcetam  Solution 500 every 24 hours  metoclopramide 5 every 8 hours  midodrine 15 every 8 hours  modafinil 100 daily  multivitamin 1 daily  pantoprazole   Suspension 40 daily  silver sulfADIAZINE 1% Cream 1 daily      Allergies    No Known Allergies    Intolerances        T(C): , Max: 37.4 (05-19-18 @ 13:45)  T(F): , Max: 99.3 (05-19-18 @ 13:45)  HR: 90 (05-20-18 @ 10:02)  BP: 92/71 (05-20-18 @ 09:58)  BP(mean): 79 (05-20-18 @ 09:58)  RR: 17 (05-20-18 @ 06:56)  SpO2: 100% (05-20-18 @ 10:02)  Wt(kg): --    05-19 @ 07:01  -  05-20 @ 07:00  --------------------------------------------------------  IN: 300 mL / OUT: 2300 mL / NET: -2000 mL    Review of Systems:  Limited. Patient off ventilatory support on trach collar at present.    PHYSICAL EXAM:  GENERAL: Ill appearing, awake but confused, disoriented in no acute distress at present  HEAD:  Atraumatic, Normocephalic,   EYES: Bilateral conjuctival and scleral pallor+nt  Oral cavity: Oral mucosa moist and pale  NECK: Neck supple, Mild JVP, tracheostomy present.  CHEST/LUNG:  Bilateral decreased breath sounds, Bibasilar rales and crepitatiosn+nt, Right>left, no wheezing  HEART: Regular rate and rhythm. HOMER II/VI at LPSB, No gallop, no rub   ABDOMEN: Soft, Nontender, nondistended, PEG tube present. BS+nt, No flank tenderness.   EXTREMITIES: Bilateral thigh and leg edema+nt but improving, No clubbing, cyanosis  Neurology: AAOx1-2, awake lying in bed but confused, unable to follow verbal commands.  SKIN: No rashes or lesions            ACCESS: Right sided tunnel HD catheter present. no bleeding    LABS:                        9.1    15.7  )-----------( 133      ( 20 May 2018 07:19 )             29.8     05-20    133<L>  |  92<L>  |  35<H>  ----------------------------<  224<H>  4.3   |  25  |  2.38<H>    Ca    8.6      20 May 2018 07:19  Phos  3.3     05-20  Mg     1.9     05-20        PT/INR - ( 20 May 2018 11:06 )   PT: 24.3 sec;   INR: 2.16                    RADIOLOGY & ADDITIONAL STUDIES:

## 2018-05-20 NOTE — PROGRESS NOTE ADULT - PROBLEM SELECTOR PLAN 3
Treated for both septic as well as cardiogenic shock requiring pressors and inotropes. Subsequently weaned off, now on midodrine 15 mg TID. Has intermittently used albumin to tolerate dialysis. BP 80/60s.   - c/w Midodrine 15 mg q8h  - c/w hydrocortisone 20mg BID  - tolerated HD w/o albumin, f/u renal recs    #Adrenal Insufficiency  BP have been low with SBP in , with MAP> 60.  - c/w hydrocortisone 20 mg BID  - weaning off albumin Treated for both septic as well as cardiogenic shock requiring pressors and inotropes. Subsequently weaned off, now on midodrine 15 mg TID. Has intermittently used albumin to tolerate dialysis. BP 80/60s.   - c/w Midodrine 15 mg q8h  - c/w hydrocortisone 20mg BID  - tolerated HD w/o albumin, f/u renal recs    #Adrenal Insufficiency  BP have been low with SBP in , with MAP> 60.  - c/w hydrocortisone 20 mg BID

## 2018-05-20 NOTE — PROGRESS NOTE ADULT - PROBLEM SELECTOR PLAN 1
Acute kidney injury due to ischemic ATN requiring ongoing HD treatment via Right chest wall tunnel HD catheter.  Patient was last dialyzed 5/19 with UF of 2.3L UF  Volume status persistently hypervolemic but improvign and K 4.3  Will defer HD treatment today.  Next anticipated HD on 5/21 or 5/22  Low K/Low phos/renal diet

## 2018-05-20 NOTE — PROGRESS NOTE ADULT - ASSESSMENT
63M PMH HFrEF 10-15% (ischemic), MI, s/p AICD vs PPM, possible Afib, HTN, DM2 on insulin, possible CKD, and gout, who presented with a chief complaint of generalized weakness (3/10/2018) s/p septic shock likely 2/2 LE cellulitis complicated by ATN requiring dialysis. Course complicated by AMS likely from brainstem infarct 2/2 LV thrombus vs toxic metabolic encephalopathy. Further complicated by development of candidemia and sepsis 2/2 klebsiella bacteremia s/p fluconazole and CTX, now resolved. s/p completed course of Zosyn for aspiration PNA.  Goals of care discussion for which patient made DNR, but with continued escalation of care. Continues to undergo management for respiratory failure with weaning as tolerated with CPAP, trach collar trials. With complications of hypotension, tolerating HD w/o albumin.

## 2018-05-20 NOTE — PROGRESS NOTE ADULT - SUBJECTIVE AND OBJECTIVE BOX
INTERVAL HPI/OVERNIGHT EVENTS:    SUBJECTIVE: Patient seen and examined at bedside.    OBJECTIVE:  VITAL SIGNS:  ICU Vital Signs Last 24 Hrs  T(C): 37.2 (20 May 2018 04:54), Max: 37.4 (19 May 2018 13:45)  T(F): 99 (20 May 2018 04:54), Max: 99.3 (19 May 2018 13:45)  HR: 96 (20 May 2018 05:12) (82 - 102)  BP: 101/77 (20 May 2018 05:12) (83/64 - 102/73)  BP(mean): 85 (20 May 2018 05:12) (66 - 85)  RR: 15 (20 May 2018 05:12) (15 - 27)  SpO2: 98% (20 May 2018 05:12) (98% - 100%)    Mode: AC/ CMV (Assist Control/ Continuous Mandatory Ventilation), RR (machine): 10, TV (machine): 500, FiO2: 40, PEEP: 5, ITime: 1, MAP: 9, PIP: 14    05-18 @ 07:01 - 05-19 @ 07:00  --------------------------------------------------------  IN: 0 mL / OUT: 2000 mL / NET: -2000 mL    05-19 @ 07:01 - 05-20 @ 06:52  --------------------------------------------------------  IN: 300 mL / OUT: 2300 mL / NET: -2000 mL      CAPILLARY BLOOD GLUCOSE      POCT Blood Glucose.: 208 mg/dL (20 May 2018 06:02)      PHYSICAL EXAM:  General: NAD  HEENT: NC/AT; PERRL, clear conjunctiva  Neck: supple  Respiratory: CTA b/l  Cardiovascular: +S1/S2; RRR  Abdomen: soft, NT/ND; +BS x4  Extremities: WWP, 2+ peripheral pulses b/l; no LE edema  Skin: normal color and turgor; no rash  Neurological:     MEDICATIONS:  MEDICATIONS  (STANDING):  aspirin  chewable 81 milliGRAM(s) Oral daily  atorvastatin 80 milliGRAM(s) Oral at bedtime  chlorhexidine 0.12% Liquid 15 milliLiter(s) Swish and Spit two times a day  chlorhexidine 2% Cloths 1 Application(s) Topical daily  collagenase Ointment 1 Application(s) Topical daily  dextrose 5%. 1000 milliLiter(s) (50 mL/Hr) IV Continuous <Continuous>  dextrose 50% Injectable 12.5 Gram(s) IV Push once  dextrose 50% Injectable 25 Gram(s) IV Push once  dextrose 50% Injectable 25 Gram(s) IV Push once  ergocalciferol Drops 6000 Unit(s) Oral daily  escitalopram 5 milliGRAM(s) Oral daily  folic acid 1 milliGRAM(s) Oral daily  hydrocortisone 20 milliGRAM(s) Oral every 12 hours  insulin regular  human corrective regimen sliding scale   SubCutaneous every 6 hours  insulin regular  human recombinant 5 Unit(s) SubCutaneous every 6 hours  levETIRAcetam  Solution 500 milliGRAM(s) Oral every 24 hours  metoclopramide 5 milliGRAM(s) Oral every 8 hours  midodrine 15 milliGRAM(s) Oral every 8 hours  modafinil 100 milliGRAM(s) Oral daily  multivitamin 1 Tablet(s) Oral daily  pantoprazole   Suspension 40 milliGRAM(s) Oral daily  silver sulfADIAZINE 1% Cream 1 Application(s) Topical daily    MEDICATIONS  (PRN):  acetaminophen    Suspension. 650 milliGRAM(s) Oral every 6 hours PRN Moderate Pain (4 - 6)  acetaminophen  Suppository 650 milliGRAM(s) Rectal every 6 hours PRN For Temp greater than 38 C (100.4 F)  dextrose Gel 1 Dose(s) Oral once PRN Blood Glucose LESS THAN 70 milliGRAM(s)/deciliter  glucagon  Injectable 1 milliGRAM(s) IntraMuscular once PRN Glucose LESS THAN 70 milligrams/deciliter      ALLERGIES:  Allergies    No Known Allergies    Intolerances        LABS:                        9.1    13.4  )-----------( 132      ( 19 May 2018 06:09 )             30.5     05-19    131<L>  |  92<L>  |  42<H>  ----------------------------<  266<H>  4.9   |  25  |  2.90<H>    Ca    8.6      19 May 2018 06:09  Phos  4.4     05-19  Mg     2.2     05-19      PT/INR - ( 19 May 2018 06:09 )   PT: 22.7 sec;   INR: 2.02          RADIOLOGY & ADDITIONAL TESTS: Reviewed. INTERVAL HPI/OVERNIGHT EVENTS: Was on CPAP overnight, comfortable.     SUBJECTIVE: Patient seen and examined at bedside. Pt awake and alert today but not following commands. Looks when I call his name but does not squeeze hands. When asked if he's in pain, shakes head no.     Patient tachypneic on trach collar this AM with copious secretions. Placed back on CPAP with improvement and given duonebsx1.     OBJECTIVE:  VITAL SIGNS:  ICU Vital Signs Last 24 Hrs  T(C): 37.2 (20 May 2018 04:54), Max: 37.4 (19 May 2018 13:45)  T(F): 99 (20 May 2018 04:54), Max: 99.3 (19 May 2018 13:45)  HR: 96 (20 May 2018 05:12) (82 - 102)  BP: 101/77 (20 May 2018 05:12) (83/64 - 102/73)  BP(mean): 85 (20 May 2018 05:12) (66 - 85)  RR: 15 (20 May 2018 05:12) (15 - 27)  SpO2: 98% (20 May 2018 05:12) (98% - 100%)    Mode: AC/ CMV (Assist Control/ Continuous Mandatory Ventilation), RR (machine): 10, TV (machine): 500, FiO2: 40, PEEP: 5, ITime: 1, MAP: 9, PIP: 14    05-18 @ 07:01  -  05-19 @ 07:00  --------------------------------------------------------  IN: 0 mL / OUT: 2000 mL / NET: -2000 mL    05-19 @ 07:01  -  05-20 @ 06:52  --------------------------------------------------------  IN: 300 mL / OUT: 2300 mL / NET: -2000 mL      CAPILLARY BLOOD GLUCOSE      POCT Blood Glucose.: 208 mg/dL (20 May 2018 06:02)      PHYSICAL EXAM:  General: Sitting up in bed, on trach collar, tachypneic, alert and looks to name but does not follow commands  HEENT: NC/AT; PERRL, anicteric sclera; MMM  Neck: +trach collar in place, copious thin secretions today w/o purulence  Cardiovascular: +S1/S2; regular with mild tachycardia  Respiratory: diminished breath sounds b/l; bibasilar crackles; no rhonchi appreciated  Gastrointestinal: distended and firm but non-tender and bowel sounds present; PEG in place, no drainage around site, feeds running; no TTP, BSx4  Extremities: WWP; 2+ pitting edema b/l; chronic venous stasis changes bilateral LE   Vascular: palp peripheral pulses b/l  Neurological: unable to assess orientation as pt nonverbal. Alert and awake. Appears to respond to name.   Skin: no rash; chronic venous stasis changes b/l LE with dressings applied by wound care    MEDICATIONS:  MEDICATIONS  (STANDING):  aspirin  chewable 81 milliGRAM(s) Oral daily  atorvastatin 80 milliGRAM(s) Oral at bedtime  chlorhexidine 0.12% Liquid 15 milliLiter(s) Swish and Spit two times a day  chlorhexidine 2% Cloths 1 Application(s) Topical daily  collagenase Ointment 1 Application(s) Topical daily  dextrose 5%. 1000 milliLiter(s) (50 mL/Hr) IV Continuous <Continuous>  dextrose 50% Injectable 12.5 Gram(s) IV Push once  dextrose 50% Injectable 25 Gram(s) IV Push once  dextrose 50% Injectable 25 Gram(s) IV Push once  ergocalciferol Drops 6000 Unit(s) Oral daily  escitalopram 5 milliGRAM(s) Oral daily  folic acid 1 milliGRAM(s) Oral daily  hydrocortisone 20 milliGRAM(s) Oral every 12 hours  insulin regular  human corrective regimen sliding scale   SubCutaneous every 6 hours  insulin regular  human recombinant 5 Unit(s) SubCutaneous every 6 hours  levETIRAcetam  Solution 500 milliGRAM(s) Oral every 24 hours  metoclopramide 5 milliGRAM(s) Oral every 8 hours  midodrine 15 milliGRAM(s) Oral every 8 hours  modafinil 100 milliGRAM(s) Oral daily  multivitamin 1 Tablet(s) Oral daily  pantoprazole   Suspension 40 milliGRAM(s) Oral daily  silver sulfADIAZINE 1% Cream 1 Application(s) Topical daily    MEDICATIONS  (PRN):  acetaminophen    Suspension. 650 milliGRAM(s) Oral every 6 hours PRN Moderate Pain (4 - 6)  acetaminophen  Suppository 650 milliGRAM(s) Rectal every 6 hours PRN For Temp greater than 38 C (100.4 F)  dextrose Gel 1 Dose(s) Oral once PRN Blood Glucose LESS THAN 70 milliGRAM(s)/deciliter  glucagon  Injectable 1 milliGRAM(s) IntraMuscular once PRN Glucose LESS THAN 70 milligrams/deciliter      ALLERGIES:  Allergies    No Known Allergies    Intolerances        LABS:                        9.1    13.4  )-----------( 132      ( 19 May 2018 06:09 )             30.5     05-19    131<L>  |  92<L>  |  42<H>  ----------------------------<  266<H>  4.9   |  25  |  2.90<H>    Ca    8.6      19 May 2018 06:09  Phos  4.4     05-19  Mg     2.2     05-19      PT/INR - ( 19 May 2018 06:09 )   PT: 22.7 sec;   INR: 2.02          RADIOLOGY & ADDITIONAL TESTS: Reviewed.

## 2018-05-20 NOTE — PROGRESS NOTE ADULT - PROBLEM SELECTOR PLAN 4
Intermittently not following commands.  Noted to have change in mental status during admission, with concerns of encephalopathy likely multifactorial in setting of septic and cardiogenic shock as well as c/b brain stem infarct.   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 Per neurology may have had ischemic event from hypoperfusion. Also showing disc protrusion at C3/C4 level coursing superiorly to the C2/C3 contacting the ventral spinal cord.   - Neurology was previously following  - c/w Modafinil  - c/w Keppra 500 mg qd with extra 250 mg post HD days for seizures

## 2018-05-20 NOTE — PROGRESS NOTE ADULT - PROBLEM SELECTOR PLAN 1
Acute respiratory failure in setting of septic and cardiogenic shock, with trach placed on 4/5/18 for persistent, now chronic respiratory failure. CXR showing effusions, tolerated HD yesterday 2.5L removed w/o albumin.  - back on ACVC 2/2 less responsive yesterday, will try trach collar as tolerated  - continues to tolerate HD w/o albumin Acute respiratory failure in setting of septic and cardiogenic shock, with trach placed on 4/5/18 for persistent, now chronic respiratory failure. CXR showing effusions, tolerated HD 5/19 2.3L removed w/o albumin.  - now on CPAP, tolerated trach collar today, will try trach collar again as tolerated  - f/u sputum culture, though pt not meeting sepsis criteria so unlikely to have active infection unless clinical picture changes  - CXR unchanged  - continues to tolerate HD w/o albumin

## 2018-05-20 NOTE — PROGRESS NOTE ADULT - PROBLEM SELECTOR PLAN 2
DM2 on Januvia at home. HbA1C 8.6. Difficulty controlling glucose levels with multiple episodes of hypoglycemia and hyperglycemia.   - placed on regular insulin 5U q6  - SSI  - sugars better controlled in the last 24hrs ~140s-180s DM2 on Januvia at home. HbA1C 8.6. Difficulty controlling glucose levels with multiple episodes of hypoglycemia and hyperglycemia.   - c/w regular insulin 5U q6  - SSI  - sugars still with some episodes of hyperglycemia to 190-200, will continue to monitor and adjust regimen as needed

## 2018-05-21 LAB
ANION GAP SERPL CALC-SCNC: 15 MMOL/L — SIGNIFICANT CHANGE UP (ref 5–17)
BUN SERPL-MCNC: 47 MG/DL — HIGH (ref 7–23)
CALCIUM SERPL-MCNC: 8.9 MG/DL — SIGNIFICANT CHANGE UP (ref 8.4–10.5)
CHLORIDE SERPL-SCNC: 91 MMOL/L — LOW (ref 96–108)
CO2 SERPL-SCNC: 27 MMOL/L — SIGNIFICANT CHANGE UP (ref 22–31)
CREAT SERPL-MCNC: 3.03 MG/DL — HIGH (ref 0.5–1.3)
GLUCOSE BLDC GLUCOMTR-MCNC: 175 MG/DL — HIGH (ref 70–99)
GLUCOSE BLDC GLUCOMTR-MCNC: 279 MG/DL — HIGH (ref 70–99)
GLUCOSE BLDC GLUCOMTR-MCNC: 357 MG/DL — HIGH (ref 70–99)
GLUCOSE BLDC GLUCOMTR-MCNC: 423 MG/DL — HIGH (ref 70–99)
GLUCOSE SERPL-MCNC: 303 MG/DL — HIGH (ref 70–99)
HCT VFR BLD CALC: 29.7 % — LOW (ref 39–50)
HGB BLD-MCNC: 9.1 G/DL — LOW (ref 13–17)
INR BLD: 2.42 — HIGH (ref 0.88–1.16)
MAGNESIUM SERPL-MCNC: 2 MG/DL — SIGNIFICANT CHANGE UP (ref 1.6–2.6)
MCHC RBC-ENTMCNC: 27.5 PG — SIGNIFICANT CHANGE UP (ref 27–34)
MCHC RBC-ENTMCNC: 30.6 G/DL — LOW (ref 32–36)
MCV RBC AUTO: 89.7 FL — SIGNIFICANT CHANGE UP (ref 80–100)
PHOSPHATE SERPL-MCNC: 3.5 MG/DL — SIGNIFICANT CHANGE UP (ref 2.5–4.5)
PLATELET # BLD AUTO: 133 K/UL — LOW (ref 150–400)
POTASSIUM SERPL-MCNC: 5.1 MMOL/L — SIGNIFICANT CHANGE UP (ref 3.5–5.3)
POTASSIUM SERPL-SCNC: 5.1 MMOL/L — SIGNIFICANT CHANGE UP (ref 3.5–5.3)
PROTHROM AB SERPL-ACNC: 27.4 SEC — HIGH (ref 9.8–12.7)
RBC # BLD: 3.31 M/UL — LOW (ref 4.2–5.8)
RBC # FLD: 16.7 % — SIGNIFICANT CHANGE UP (ref 10.3–16.9)
SODIUM SERPL-SCNC: 133 MMOL/L — LOW (ref 135–145)
WBC # BLD: 13 K/UL — HIGH (ref 3.8–10.5)
WBC # FLD AUTO: 13 K/UL — HIGH (ref 3.8–10.5)

## 2018-05-21 PROCEDURE — 99233 SBSQ HOSP IP/OBS HIGH 50: CPT | Mod: GC

## 2018-05-21 RX ORDER — HYDROCORTISONE 20 MG
15 TABLET ORAL EVERY 12 HOURS
Qty: 0 | Refills: 0 | Status: DISCONTINUED | OUTPATIENT
Start: 2018-05-21 | End: 2018-05-29

## 2018-05-21 RX ORDER — LEVETIRACETAM 250 MG/1
250 TABLET, FILM COATED ORAL ONCE
Qty: 0 | Refills: 0 | Status: DISCONTINUED | OUTPATIENT
Start: 2018-05-21 | End: 2018-05-21

## 2018-05-21 RX ORDER — WARFARIN SODIUM 2.5 MG/1
2 TABLET ORAL ONCE
Qty: 0 | Refills: 0 | Status: COMPLETED | OUTPATIENT
Start: 2018-05-21 | End: 2018-05-21

## 2018-05-21 RX ORDER — MIDODRINE HYDROCHLORIDE 2.5 MG/1
10 TABLET ORAL ONCE
Qty: 0 | Refills: 0 | Status: COMPLETED | OUTPATIENT
Start: 2018-05-21 | End: 2018-05-22

## 2018-05-21 RX ADMIN — Medication 5 MILLIGRAM(S): at 04:11

## 2018-05-21 RX ADMIN — INSULIN HUMAN 12: 100 INJECTION, SOLUTION SUBCUTANEOUS at 17:58

## 2018-05-21 RX ADMIN — Medication 650 MILLIGRAM(S): at 11:11

## 2018-05-21 RX ADMIN — LEVETIRACETAM 500 MILLIGRAM(S): 250 TABLET, FILM COATED ORAL at 17:59

## 2018-05-21 RX ADMIN — ERGOCALCIFEROL 6000 UNIT(S): 1.25 CAPSULE ORAL at 11:32

## 2018-05-21 RX ADMIN — Medication 20 MILLIGRAM(S): at 01:11

## 2018-05-21 RX ADMIN — MIDODRINE HYDROCHLORIDE 15 MILLIGRAM(S): 2.5 TABLET ORAL at 19:53

## 2018-05-21 RX ADMIN — CHLORHEXIDINE GLUCONATE 15 MILLILITER(S): 213 SOLUTION TOPICAL at 11:30

## 2018-05-21 RX ADMIN — INSULIN HUMAN 6: 100 INJECTION, SOLUTION SUBCUTANEOUS at 06:00

## 2018-05-21 RX ADMIN — PANTOPRAZOLE SODIUM 40 MILLIGRAM(S): 20 TABLET, DELAYED RELEASE ORAL at 11:29

## 2018-05-21 RX ADMIN — INSULIN HUMAN 5 UNIT(S): 100 INJECTION, SOLUTION SUBCUTANEOUS at 17:59

## 2018-05-21 RX ADMIN — Medication 5 MILLIGRAM(S): at 11:25

## 2018-05-21 RX ADMIN — CHLORHEXIDINE GLUCONATE 15 MILLILITER(S): 213 SOLUTION TOPICAL at 22:46

## 2018-05-21 RX ADMIN — Medication 81 MILLIGRAM(S): at 11:25

## 2018-05-21 RX ADMIN — MIDODRINE HYDROCHLORIDE 15 MILLIGRAM(S): 2.5 TABLET ORAL at 03:57

## 2018-05-21 RX ADMIN — Medication 0.5 MILLIGRAM(S): at 01:00

## 2018-05-21 RX ADMIN — INSULIN HUMAN 5 UNIT(S): 100 INJECTION, SOLUTION SUBCUTANEOUS at 01:07

## 2018-05-21 RX ADMIN — ATORVASTATIN CALCIUM 80 MILLIGRAM(S): 80 TABLET, FILM COATED ORAL at 22:03

## 2018-05-21 RX ADMIN — Medication 5 MILLIGRAM(S): at 19:53

## 2018-05-21 RX ADMIN — Medication 1 TABLET(S): at 11:25

## 2018-05-21 RX ADMIN — ESCITALOPRAM OXALATE 5 MILLIGRAM(S): 10 TABLET, FILM COATED ORAL at 11:27

## 2018-05-21 RX ADMIN — CHLORHEXIDINE GLUCONATE 1 APPLICATION(S): 213 SOLUTION TOPICAL at 05:56

## 2018-05-21 RX ADMIN — Medication 15 MILLIGRAM(S): at 22:03

## 2018-05-21 RX ADMIN — Medication 1 APPLICATION(S): at 05:55

## 2018-05-21 RX ADMIN — INSULIN HUMAN 10: 100 INJECTION, SOLUTION SUBCUTANEOUS at 12:03

## 2018-05-21 RX ADMIN — MODAFINIL 100 MILLIGRAM(S): 200 TABLET ORAL at 11:26

## 2018-05-21 RX ADMIN — INSULIN HUMAN 2: 100 INJECTION, SOLUTION SUBCUTANEOUS at 01:07

## 2018-05-21 RX ADMIN — Medication 1 APPLICATION(S): at 12:56

## 2018-05-21 RX ADMIN — INSULIN HUMAN 5 UNIT(S): 100 INJECTION, SOLUTION SUBCUTANEOUS at 12:03

## 2018-05-21 RX ADMIN — WARFARIN SODIUM 2 MILLIGRAM(S): 2.5 TABLET ORAL at 22:03

## 2018-05-21 RX ADMIN — Medication 1 MILLIGRAM(S): at 11:26

## 2018-05-21 RX ADMIN — MIDODRINE HYDROCHLORIDE 15 MILLIGRAM(S): 2.5 TABLET ORAL at 11:27

## 2018-05-21 RX ADMIN — Medication 20 MILLIGRAM(S): at 11:26

## 2018-05-21 RX ADMIN — INSULIN HUMAN 5 UNIT(S): 100 INJECTION, SOLUTION SUBCUTANEOUS at 05:59

## 2018-05-21 NOTE — PROGRESS NOTE ADULT - PROBLEM SELECTOR PLAN 2
DM2 on Januvia at home. HbA1C 8.6. Difficulty controlling glucose levels with multiple episodes of hypoglycemia and hyperglycemia. symptomatically hypoglycemic on glucerna (avoid).    - c/w regular insulin 5U q6 --> will need uptitratin in AM has hyperglycemic  - SSI  - consider ENDO consult

## 2018-05-21 NOTE — PROGRESS NOTE ADULT - PROBLEM SELECTOR PLAN 6
Hx of Afib and LV thrombus on Coumadin prior to admission. Most recent TTE with Definity shows no LV thrombus currently.   - Not currently on rate control as HR has remained stable but has received Lopressor pushes for HR goal <110  - dose Coumadin based on INR, dosed 2mg for HS for INR 2.16 today    #LV thrombus  LV thrombus noted on RUKHSANA on 4/10. Remains therapeutic on warfarin.  - Dose Coumadin based on INR, dosed 2mg for HS for INR 2.16 today Hx of Afib and LV thrombus on Coumadin prior to admission. Most recent TTE with Definity shows no LV thrombus currently.   - Not currently on rate control as HR has remained stable but has received Lopressor pushes for HR goal <110  - dose Coumadin based on INR daily    #LV thrombus  LV thrombus noted on RUKHSANA on 4/10. Remains therapeutic on warfarin.  - Dose Coumadin based on INR

## 2018-05-21 NOTE — PROGRESS NOTE ADULT - PROBLEM SELECTOR PLAN 2
DM2 on Januvia at home. HbA1C 8.6. Difficulty controlling glucose levels with multiple episodes of hypoglycemia and hyperglycemia.   - c/w regular insulin 5U q6  - SSI  - sugars still with some episodes of hyperglycemia to 190-200, will continue to monitor and adjust regimen as needed

## 2018-05-21 NOTE — PROGRESS NOTE ADULT - PROBLEM SELECTOR PLAN 5
Failure likely 2/2 to ischemic ATN with hypoperfusion in setting of shock. Baseline unknown but presenting Cr 3.93 with associated metabolic derangements. Started on HD during course with renal following. Permacath replaced multiple times over course for bacteremia and fungemia. Has R sided Permacath.   - HD 5/19, tolerated w/o albumin  - f/u with renal regrarding ergocalciferol dosing

## 2018-05-21 NOTE — PROGRESS NOTE ADULT - PROBLEM SELECTOR PLAN 3
Treated for both septic as well as cardiogenic shock requiring pressors and inotropes. Subsequently weaned off, now on midodrine 15 mg TID. Has intermittently used albumin to tolerate dialysis. BP 80/60s.   - c/w Midodrine 15 mg q8h + add 10 midodrine pre-HD  - hydrocortisone 20mg BID, tapered to 15 BID on 5/21, consider further taper as tolerated  - tolerated HD w/o albumin, f/u renal recs    # r/o adrenal Insufficiency  BP have been low with SBP in , with MAP> 60.  - continue to taper stress dose steroids

## 2018-05-21 NOTE — CHART NOTE - NSCHARTNOTEFT_GEN_A_CORE
Admitting Diagnosis:   63M PMH HFrEF 10-15% (ischemic), MI, s/p AICD vs PPM, possible Afib, HTN, DM2 on insulin, possible CKD, and gout, who presents with a chief complaint of generalized weakness s/p septic shock likely 2/2 LE cellulitis. AMS and new leukocytisis likely 2/2 UTI. Trach and PEG dependent. Continues to receive HD TIW.    PAST MEDICAL & SURGICAL HISTORY:  Type 2 diabetes mellitus with diabetic peripheral angiopathy without gangrene, with long-term curren  Essential hypertension, benign  Gout  Pacemaker  Chronic systolic heart failure  Myocardial infarction  No significant past surgical history      Current Nutrition Order:  Jevity 1.5 Terrell @ 50mL/hr x 24hrs plus ProStat Sugar Free BID (200 kcal, 30g protein) via PEG providing in total: 1200mL TV, 2000 kcal, 107g protein, 912 mL free H2O, 120% RDI    PO Intake: Good (%) [   ]  Fair (50-75%) [   ] Poor (<25%) [   ]- N/A NPO with TF    GI Issues: Good tolerance per RN; last BM 5/20    Pain: Unable to assess at this time 2/2 vent     Skin Integrity:  L buttock stage 2 PU  L calf venous ulcer  Trach stage 3 PU  L. UE eschar     Labs:   05-21    133<L>  |  91<L>  |  47<H>  ----------------------------<  303<H>  5.1   |  27  |  3.03<H>    Ca    8.9      21 May 2018 07:18  Phos  3.5     05-21  Mg     2.0     05-21      CAPILLARY BLOOD GLUCOSE      POCT Blood Glucose.: 357 mg/dL (21 May 2018 11:53)  POCT Blood Glucose.: 279 mg/dL (21 May 2018 05:57)  POCT Blood Glucose.: 175 mg/dL (21 May 2018 00:08)  POCT Blood Glucose.: 248 mg/dL (20 May 2018 17:32)      Medications:  MEDICATIONS  (STANDING):  aspirin  chewable 81 milliGRAM(s) Oral daily  atorvastatin 80 milliGRAM(s) Oral at bedtime  chlorhexidine 0.12% Liquid 15 milliLiter(s) Swish and Spit two times a day  chlorhexidine 2% Cloths 1 Application(s) Topical daily  collagenase Ointment 1 Application(s) Topical daily  dextrose 5%. 1000 milliLiter(s) (50 mL/Hr) IV Continuous <Continuous>  dextrose 50% Injectable 12.5 Gram(s) IV Push once  dextrose 50% Injectable 25 Gram(s) IV Push once  dextrose 50% Injectable 25 Gram(s) IV Push once  ergocalciferol Drops 6000 Unit(s) Oral daily  escitalopram 5 milliGRAM(s) Oral daily  folic acid 1 milliGRAM(s) Oral daily  hydrocortisone 20 milliGRAM(s) Oral every 12 hours  insulin regular  human corrective regimen sliding scale   SubCutaneous every 6 hours  insulin regular  human recombinant 5 Unit(s) SubCutaneous every 6 hours  levETIRAcetam  Solution 500 milliGRAM(s) Oral every 24 hours  metoclopramide 5 milliGRAM(s) Oral every 8 hours  midodrine 15 milliGRAM(s) Oral every 8 hours  midodrine 10 milliGRAM(s) Oral once  modafinil 100 milliGRAM(s) Oral daily  multivitamin 1 Tablet(s) Oral daily  pantoprazole   Suspension 40 milliGRAM(s) Oral daily  silver sulfADIAZINE 1% Cream 1 Application(s) Topical daily    MEDICATIONS  (PRN):  acetaminophen    Suspension. 650 milliGRAM(s) Oral every 6 hours PRN Moderate Pain (4 - 6)  acetaminophen  Suppository 650 milliGRAM(s) Rectal every 6 hours PRN For Temp greater than 38 C (100.4 F)  dextrose Gel 1 Dose(s) Oral once PRN Blood Glucose LESS THAN 70 milliGRAM(s)/deciliter  glucagon  Injectable 1 milliGRAM(s) IntraMuscular once PRN Glucose LESS THAN 70 milligrams/deciliter      Weight:  80.3 (5/19)    Weight:  85.6kg (5/14)  86.8kg (5/9)  86.2kg (5/3)  85.1kg (5/1)  79.1kg (4/28)  75.9kg (4/26)  77.4kg (4/19)  72.7kg (4/14)  77.2kg (4/7)  80.9 kg (4/6)  84.5kg (3/31)  86.9kg (3/19)  94.7 kg (3/16)    Weight Change:   Weight fluctuating throughout admission d/t HD, TF inconsistencies    Estimated energy needs using 89 kg IBW; Needs estimated 2/2 vent/post-op/PU/HD  Calories: 25-30 kcal/kg = 2676-4885 kcal/day  Protein: 1.4-1.6 g/kg = 125-142 g protein/day  Fluids: per team 2/2 HD     Subjective:   S/p trach and PEG placements on 4/4. S/p permacath replacement on 4/24. Stable on MICU step down at this time. Pt seen in room, asleep, undergoing HD. Trached to vent on VC/AC mode; tolerating CPAP trials, but became tachypnic on trach collar. TF running at current goal rate of 50mL/hr with good tolerance. EN formula was switched to Jevity 1.5 over weekend d/t periods of hypoglycemia and elevated K (higher amounts in Glucerna than Jevity) per MD; though needs not being met at this current rate. BG being monitored and managed closely. Per discussed with MD, will keep on Jevity for now and provide higher insulin coverage. Still pending LTAC placement. , 175mg/dL, Na 133 (L), K 5.1, Mg 2.0, Phos 3.5.    Previous Nutrition Diagnosis:   Increased protein-calorie needs RT increased demand for protein-calorie intake AEB on vent support    Active [X]  Resolved [   ]    New PES statement:     Goal:   Continue to meet % of nutrition needs via tolerated route.     Recommendations:  1. Recommend changing TF order to meet needs: Jevity 1.5 Terrell @ 62mL/hr x 24hrs plus ProStat Sugar Free BID (200 kcal, 30g protein) to provide in total: 1488 mL TV, 2432 kcal, 125g protein, 1131 mL free H2O, 148% RDI, 1.40g/kg IBW protein. Monitor for s/s intolerance; maintain aspiration precautions at all times  *endorsed to team and ordered for verification   2. Continue to trend weights  3 Monitor POC BG and provide adequate insulin coverage  4. Continue to monitor for GOC and keep nutrition in line at all times     Education:   N/A-vent    Risk Level: High [X] Moderate [   ] Low [   ]

## 2018-05-21 NOTE — PROGRESS NOTE ADULT - PROBLEM SELECTOR PLAN 1
Acute respiratory failure in setting of septic and cardiogenic shock, with trach placed on 4/5/18 for persistent, now chronic respiratory failure. CXR showing effusions, tolerated HD 5/19 2.3L removed w/o albumin.  - now on CPAP, tolerated trach collar today, will try trach collar again as tolerated  - f/u sputum culture, though pt not meeting sepsis criteria so unlikely to have active infection unless clinical picture changes  - CXR unchanged  - continues to tolerate HD w/o albumin Acute respiratory failure in setting of septic and cardiogenic shock, with trach placed on 4/5/18 for persistent, now chronic respiratory failure. CXR showing effusions, tolerated HD 5/19 2.3L removed w/o albumin.  - now back on ACVC, wean to CPAP/trach collar as tolerated  - f/u sputum culture, growing GNR, though pt not meeting sepsis criteria so unlikely to have active infection unless clinical picture changes and will not add abx at this time  - CXR unchanged  - continues to tolerate HD w/o albumin

## 2018-05-21 NOTE — PROGRESS NOTE ADULT - PROBLEM SELECTOR PLAN 3
Treated for both septic as well as cardiogenic shock requiring pressors and inotropes. Subsequently weaned off, now on midodrine 15 mg TID. Has intermittently used albumin to tolerate dialysis. BP 80/60s.   - c/w Midodrine 15 mg q8h  - c/w hydrocortisone 20mg BID  - tolerated HD w/o albumin, f/u renal recs    #Adrenal Insufficiency  BP have been low with SBP in , with MAP> 60.  - c/w hydrocortisone 20 mg BID Treated for both septic as well as cardiogenic shock requiring pressors and inotropes. Subsequently weaned off, now on midodrine 15 mg TID. Has intermittently used albumin to tolerate dialysis. BP 80/60s.   - c/w Midodrine 15 mg q8h + add 10 midodrine pre-HD  - hydrocortisone 20mg BID, taper to 15 BID today  - tolerated HD w/o albumin, f/u renal recs    # r/o adrenal Insufficiency  BP have been low with SBP in , with MAP> 60.  - continue to taper stress dose steroids

## 2018-05-21 NOTE — PROGRESS NOTE ADULT - ASSESSMENT
63M PMH HFrEF 10-15% (ischemic), MI, s/p AICD vs PPM, possible Afib, HTN, DM2 on insulin, possible CKD, and gout, who presented with a chief complaint of generalized weakness (3/10/2018) s/p septic shock likely 2/2 LE cellulitis complicated by ATN requiring dialysis.     brainstem infarct 2/2 LV thrombus vs toxic metabolic encephalopathy. Further complicated by development of candidemia and sepsis 2/2 klebsiella bacteremia s/p fluconazole and CTX, now resolved. s/p completed course of Zosyn for aspiration PNA.

## 2018-05-21 NOTE — PROGRESS NOTE ADULT - PROBLEM SELECTOR PLAN 1
next HD tomorrow (5/22) if remains anuric  EPO to be given with HD for anemia  Hectorol for hyperparathyroidism

## 2018-05-21 NOTE — PROGRESS NOTE ADULT - PROBLEM SELECTOR PLAN 10
VTE: Coumadin dosed nightly, with INR daily checks, goal 2-3  GI PPx: PPI  DNR- Made DNR, family wants escalation of care, pressors if needed.  F: No IVF in setting of HD  E: Replete electrolytes with caution in setting of HD  N: Jevity 1.5 goal rate 62cc/hr, per updated nutrition recs 5/21  Dispo: SD to [facility], likely chronic vent facility + HD, pending SW placement. Dispo plans and SD plans discussed with daughter Cristal over phone today.    **Can consider lab draws on dialysis days only if INR holds at therapeutic range

## 2018-05-21 NOTE — PROGRESS NOTE ADULT - SUBJECTIVE AND OBJECTIVE BOX
TRANSFER NOTE:    63M PMH HFrEF 10-15% (ischemic), CAD (STEMI per [facility] records 7/17), s/p AICD, Afib, HTN, DM2 (on insulin), CKD, gout admitted for septic shock 2/2 LE cellulitis as well as concern for ATN in setting of shock. Patient was admitted, started on Vanc/Zosyn for broad coverage given steroid use,  and started on levophed for presumed septic shock. However given low mix venous satruation there was c/f component of cardiogenic shock and pt was given inotropes however this was d/c due to tachycardia. Patient completed 11 day course of Vanc/Zosyn. He received an extensive infectious w/u which included a gallium scan c/w LLE cellulitis and a TTE w/o vegetations. Patient’s course was c/b worsening renal function and eventual anuria, for which renal was consulted. HD cath was placed and pt graciela CVVD and eventually transitioned to intermittent HD. His course was further c/b b/l pulmonary effusions requiring R chest tube placement. Given extensive cardiac hx and component of cardiogenic shock, cardiology was consulted and pt started on vasopressin for blood pressure and afterload reducers however unable to tolerate afterload reduction. Transient c/f HIT given platelet drop while on hep gtt, however ruled out and pt transitioned back to hep gtt and then bridged to Coumadin for AFib AC. Patient’s course was further c/b stroke when patient became unresponsive with decreased use of L side (stroke code -- CT negative, MR showing chronic infarcts and possible small brainstem stroke) requiring intubationg for airway protection. His mental status did not improve after starting modafinil, vEEG done to r/o seizures showing slowing but no epileptiform discharges. Patient trach’d and PEG’d because unable to wean off vent and unable to mentate enough to swallow. Pt later found to be fungemic with candida tropicalis and treated with micafungin then narrowed to fluconazole. RUKHSANA at that time negative for vegetations or AICD infection. CT A/P performed to evaluate source which was unrevealing. HD cath removed and replaced after surveillance cultures remained negative. Course further complicated by Klebsiella bacteremia, treated with meropenem then de-escalated to Zosyn and de-escalated further to CTX. Upon weaning from pressors, pt started on hydrocortisone and midodrine. Pt with persistent fevers, HD cath again removed 4/19 and replaced with temporary HD cath. Patient then noted to have seizure like activity and started on Keppra. Pt also subsequently found to have septic shock 2/2 aspiration PNA, s/p treatment with Zosyn and pressors. Pt again weaned off pressors to stress dose steroids and midodrine for adrenal insufficiency. When stepped down from MICU to 7LA, patient began process of vent weaning, began tolerating trach collar. HD was continued per renal without requirement of albumin with end goal of L-TACH. Pt currently being tapered off stress steroids with goal of resuming previous regimen of prednisone 6 mg and fludrocortisone 0.1 mg daily if BP allows. Course c/b hyperglycemia to 280s in setting of resumed on feeds and stress dose steroids, insulin regimen and feeds adjusted with appropriate control of sugars. Also noted to have complications worsening tolerance of HD in setting of worsening tachycardia and decreased pressures for which patient restarted on albumin for HD. Also of note, patient made DNR with further discussion with family regarding goals of care- but continued escalation of care, with pressors, etc. Improving tolerance of HD, eventually weaned off albumin. Continued adjustments for insulin based on requirements and daily dosing of warfarin to maintain INR 2-3. Patient now to get 10mg midodrine pre-HD (needs to be ordered as one time dose pre-HD), and weaning hydrocortisone (most recently weaned to 15 BID). Pt HD stable, ready for SD to F, pending d/c to long term care facility with HD and vent weaning. Discussed SD and discharge plans with daughter, Cristal, who is HCP.    OBJECTIVE:    VITAL SIGNS:  ICU Vital Signs Last 24 Hrs  T(C): 37.3 (21 May 2018 09:50), Max: 37.3 (21 May 2018 09:50)  T(F): 99.1 (21 May 2018 09:50), Max: 99.1 (21 May 2018 09:50)  HR: 82 (21 May 2018 13:05) (82 - 96)  BP: 103/77 (21 May 2018 12:25) (89/68 - 124/72)  BP(mean): 83 (21 May 2018 12:25) (75 - 87)  ABP: --  ABP(mean): --  RR: 14 (21 May 2018 08:45) (11 - 29)  SpO2: 99% (21 May 2018 13:05) (98% - 100%)    Mode: AC/ CMV (Assist Control/ Continuous Mandatory Ventilation), RR (machine): 10, TV (machine): 500, FiO2: 40, PEEP: 5, ITime: 1, MAP: 9, PIP: 20    CAPILLARY BLOOD GLUCOSE      POCT Blood Glucose.: 357 mg/dL (21 May 2018 11:53)      PHYSICAL EXAM:  General:on ACVC, tachypneic, comfortable, NAD, sleeping   HEENT: NC/AT; PERRL, anicteric sclera; MMM  Neck: +trach; no secretions  Cardiovascular: +S1/S2; regular and no tachycardia  Respiratory: diminished breath sounds b/l; diffuse b/l crackles; no rhonchi   Gastrointestinal: distended and firm but non-tender and bowel sounds present; PEG in place, no drainage around site, feeds running; no TTP, BSx4  Extremities: WWP; 2+ pitting edema b/l; chronic venous stasis changes bilateral LE, bandage round L ankle and leg   Vascular: palp peripheral pulses b/l  Neurological:  Alert and awake. Appears to respond to name, waxing and waning, occasionally follows commands "squeeze hand'   Skin: Chronic venous stasis changes b/l LE with dressings applied by wound care    MEDICATIONS:  MEDICATIONS  (STANDING):  aspirin  chewable 81 milliGRAM(s) Oral daily  atorvastatin 80 milliGRAM(s) Oral at bedtime  chlorhexidine 0.12% Liquid 15 milliLiter(s) Swish and Spit two times a day  chlorhexidine 2% Cloths 1 Application(s) Topical daily  collagenase Ointment 1 Application(s) Topical daily  dextrose 5%. 1000 milliLiter(s) (50 mL/Hr) IV Continuous <Continuous>  dextrose 50% Injectable 12.5 Gram(s) IV Push once  dextrose 50% Injectable 25 Gram(s) IV Push once  dextrose 50% Injectable 25 Gram(s) IV Push once  ergocalciferol Drops 6000 Unit(s) Oral daily  escitalopram 5 milliGRAM(s) Oral daily  folic acid 1 milliGRAM(s) Oral daily  hydrocortisone 15 milliGRAM(s) Oral every 12 hours  insulin regular  human corrective regimen sliding scale   SubCutaneous every 6 hours  insulin regular  human recombinant 5 Unit(s) SubCutaneous every 6 hours  levETIRAcetam  Solution 500 milliGRAM(s) Oral every 24 hours  metoclopramide 5 milliGRAM(s) Oral every 8 hours  midodrine 15 milliGRAM(s) Oral every 8 hours  midodrine 10 milliGRAM(s) Oral once  modafinil 100 milliGRAM(s) Oral daily  multivitamin 1 Tablet(s) Oral daily  pantoprazole   Suspension 40 milliGRAM(s) Oral daily  silver sulfADIAZINE 1% Cream 1 Application(s) Topical daily  warfarin 2 milliGRAM(s) Oral once    MEDICATIONS  (PRN):  acetaminophen    Suspension. 650 milliGRAM(s) Oral every 6 hours PRN Moderate Pain (4 - 6)  acetaminophen  Suppository 650 milliGRAM(s) Rectal every 6 hours PRN For Temp greater than 38 C (100.4 F)  dextrose Gel 1 Dose(s) Oral once PRN Blood Glucose LESS THAN 70 milliGRAM(s)/deciliter  glucagon  Injectable 1 milliGRAM(s) IntraMuscular once PRN Glucose LESS THAN 70 milligrams/deciliter      ALLERGIES:  Allergies    No Known Allergies    Intolerances        LABS:                        9.1    13.0  )-----------( 133      ( 21 May 2018 07:18 )             29.7     05-21    133<L>  |  91<L>  |  47<H>  ----------------------------<  303<H>  5.1   |  27  |  3.03<H>    Ca    8.9      21 May 2018 07:18  Phos  3.5     05-21  Mg     2.0     05-21      PT/INR - ( 21 May 2018 07:18 )   PT: 27.4 sec;   INR: 2.42

## 2018-05-21 NOTE — PROGRESS NOTE ADULT - PROBLEM SELECTOR PLAN 10
VTE: Coumadin dosed nightly, with INR daily checks, goal 2-3  GI PPx: PPI  DNR- Made DNR, family wants escalation of care, pressors if needed.  F: No IVF in setting of HD  E: Replete electrolytes with caution in setting of HD  N: Jevity 1.5 50cc/hr  Dispo: Likely LTAC once stabilized off albumin VTE: Coumadin dosed nightly, with INR daily checks, goal 2-3  GI PPx: PPI  DNR- Made DNR, family wants escalation of care, pressors if needed.  F: No IVF in setting of HD  E: Replete electrolytes with caution in setting of HD  N: Jevity 1.5 goal rate 62cc/hr, per updated nutrition recs today  Dispo: SD to [facility], likely chronic vent facility + HD, pending SW placement. Dispo plans and SD plans discussed with daughter Cristal over phone today.

## 2018-05-21 NOTE — PROGRESS NOTE ADULT - ASSESSMENT
63M PMH HFrEF 10-15% (ischemic), MI, s/p AICD vs PPM, possible Afib, HTN, DM2 on insulin, possible CKD, and gout, who presented with a chief complaint of generalized weakness (3/10/2018) s/p septic shock likely 2/2 LE cellulitis complicated by ATN requiring dialysis. Course complicated by AMS likely from brainstem infarct 2/2 LV thrombus vs toxic metabolic encephalopathy. Further complicated by development of candidemia and sepsis 2/2 klebsiella bacteremia s/p fluconazole and CTX, now resolved. s/p completed course of Zosyn for aspiration PNA.  Goals of care discussion for which patient made DNR, but with continued escalation of care. Continues to undergo management for respiratory failure with weaning as tolerated with CPAP, trach collar trials. With complications of hypotension, tolerating HD w/o albumin. Stable and ready for SD to [facility] for continued vent weaning prior to d/c to long term care facility with HD+vent weaning.

## 2018-05-21 NOTE — PROGRESS NOTE ADULT - ATTENDING COMMENTS
Pt remains hemodynamically stable and unable to remain off ventilator. Will need chronic vent facility placement and HD.

## 2018-05-21 NOTE — PROGRESS NOTE ADULT - PROBLEM SELECTOR PLAN 5
Failure likely 2/2 to ischemic ATN with hypoperfusion in setting of shock. Baseline unknown but presenting Cr 3.93 with associated metabolic derangements. Started on HD during course with renal following. Permacath replaced multiple times over course for bacteremia and fungemia. Has R sided Permacath.   - HD 5/19, tolerated w/o albumin

## 2018-05-21 NOTE — PROGRESS NOTE ADULT - SUBJECTIVE AND OBJECTIVE BOX
CC: SEPSIS DUE TO UNSPECIFIEDORGANISM, CELLULITIS OF      INTERVAL HISTORY: trached; lying in bed      ROS: No chest pain, no sob, no abd pain. No n/v/d    PAST MEDICAL & SURGICAL HISTORY:  Type 2 diabetes mellitus with diabetic peripheral angiopathy without gangrene, with long-term curren  Essential hypertension, benign  Gout  Pacemaker  Chronic systolic heart failure  Myocardial infarction  No significant past surgical history      PHYSICAL EXAM:  T(C): 37.3 (05-21-18 @ 09:50), Max: 37.3 (05-21-18 @ 09:50)  HR: 91 (05-21-18 @ 08:45)  BP: 89/68 (05-21-18 @ 08:09) (89/68 - 124/72)  RR: 14 (05-21-18 @ 08:45)  SpO2: 98% (05-21-18 @ 08:45)  Wt(kg): --  I&O's Summary    Weight   General:trached; in NAD.  HEENT: moist mucous membranes, no pallor/cyanosis.  Neck: no JVD visible.  Cardiac: S1, S2. RRR. No murmurs   Respratory:rhonchi  Abdomen: soft. nontender. nondistended  Skin: no rashes.  Extremities: no LE edema b/l  Access: R IJ      DATA:                        9.1<L>  13.0<H> )-----------( 133<L>    ( 21 May 2018 07:18 )             29.7<L>        133<L>  |  91<L>  |  47<H>  ----------------------------<  303<H>  Ca:8.9   (21 May 2018 07:18)  5.1     |  27     |  3.03<H>      eGFR if Non : 21 <L>  eGFR if : 24 <L>                        MEDICATIONS  (STANDING):  aspirin  chewable 81 milliGRAM(s) Oral daily  atorvastatin 80 milliGRAM(s) Oral at bedtime  chlorhexidine 0.12% Liquid 15 milliLiter(s) Swish and Spit two times a day  chlorhexidine 2% Cloths 1 Application(s) Topical daily  collagenase Ointment 1 Application(s) Topical daily  dextrose 5%. 1000 milliLiter(s) (50 mL/Hr) IV Continuous <Continuous>  dextrose 50% Injectable 12.5 Gram(s) IV Push once  dextrose 50% Injectable 25 Gram(s) IV Push once  dextrose 50% Injectable 25 Gram(s) IV Push once  ergocalciferol Drops 6000 Unit(s) Oral daily  escitalopram 5 milliGRAM(s) Oral daily  folic acid 1 milliGRAM(s) Oral daily  hydrocortisone 20 milliGRAM(s) Oral every 12 hours  insulin regular  human corrective regimen sliding scale   SubCutaneous every 6 hours  insulin regular  human recombinant 5 Unit(s) SubCutaneous every 6 hours  levETIRAcetam  Solution 500 milliGRAM(s) Oral every 24 hours  metoclopramide 5 milliGRAM(s) Oral every 8 hours  midodrine 15 milliGRAM(s) Oral every 8 hours  modafinil 100 milliGRAM(s) Oral daily  multivitamin 1 Tablet(s) Oral daily  pantoprazole   Suspension 40 milliGRAM(s) Oral daily  silver sulfADIAZINE 1% Cream 1 Application(s) Topical daily    MEDICATIONS  (PRN):  acetaminophen    Suspension. 650 milliGRAM(s) Oral every 6 hours PRN Moderate Pain (4 - 6)  acetaminophen  Suppository 650 milliGRAM(s) Rectal every 6 hours PRN For Temp greater than 38 C (100.4 F)  dextrose Gel 1 Dose(s) Oral once PRN Blood Glucose LESS THAN 70 milliGRAM(s)/deciliter  glucagon  Injectable 1 milliGRAM(s) IntraMuscular once PRN Glucose LESS THAN 70 milligrams/deciliter

## 2018-05-21 NOTE — PROGRESS NOTE ADULT - PROBLEM SELECTOR PLAN 1
Acute respiratory failure in setting of septic and cardiogenic shock, with trach placed on 4/5/18 for persistent, now chronic respiratory failure. CXR showing effusions, tolerated HD 5/19 2.3L removed w/o albumin. Now back on ACVC, though per 7L team not concerned for new process.   - wean to CPAP/trach collar as tolerated  - f/u sputum culture, growing GNR, though pt not meeting sepsis criteria so unlikely to have active infection unless clinical picture changes and will not add abx at this time  - CXR unchanged  - continues to tolerate HD w/o albumin (cannot go to LTAC if req. albumin)

## 2018-05-21 NOTE — PROGRESS NOTE ADULT - PROBLEM SELECTOR PLAN 6
Hx of Afib and LV thrombus on Coumadin prior to admission. Most recent TTE with [facility] shows no LV thrombus currently.   - Not currently on rate control as HR has remained stable but has received Lopressor pushes for HR goal <110  - dose Coumadin based on INR daily (currently thereapeutic on 2 to 2.5mg dosings)     #LV thrombus  LV thrombus noted on RUKHSANA on 4/10. Remains therapeutic on warfarin.  - Dose Coumadin based on INR

## 2018-05-21 NOTE — PROGRESS NOTE ADULT - ASSESSMENT
63M PMH HFrEF 10-15% (ischemic), MI, s/p AICD vs PPM, possible Afib, HTN, DM2 on insulin, possible CKD, and gout, who presented with a chief complaint of generalized weakness (3/10/2018) s/p septic shock likely 2/2 LE cellulitis complicated by ATN requiring dialysis. Course complicated by AMS likely from brainstem infarct 2/2 LV thrombus vs toxic metabolic encephalopathy. Further complicated by development of candidemia and sepsis 2/2 klebsiella bacteremia s/p fluconazole and CTX, now resolved. s/p completed course of Zosyn for aspiration PNA.  Goals of care discussion for which patient made DNR, but with continued escalation of care. Continues to undergo management for respiratory failure with weaning as tolerated with CPAP, trach collar trials. With complications of hypotension, tolerating HD w/o albumin. 63M PMH HFrEF 10-15% (ischemic), MI, s/p AICD vs PPM, possible Afib, HTN, DM2 on insulin, possible CKD, and gout, who presented with a chief complaint of generalized weakness (3/10/2018) s/p septic shock likely 2/2 LE cellulitis complicated by ATN requiring dialysis. Course complicated by AMS likely from brainstem infarct 2/2 LV thrombus vs toxic metabolic encephalopathy. Further complicated by development of candidemia and sepsis 2/2 klebsiella bacteremia s/p fluconazole and CTX, now resolved. s/p completed course of Zosyn for aspiration PNA.  Goals of care discussion for which patient made DNR, but with continued escalation of care. Continues to undergo management for respiratory failure with weaning as tolerated with CPAP, trach collar trials. With complications of hypotension, tolerating HD w/o albumin. Stable and ready for SD to [facility] for continued vent weaning prior to d/c to long term care facility with HD+vent weaning.

## 2018-05-22 DIAGNOSIS — E87.3 ALKALOSIS: ICD-10-CM

## 2018-05-22 LAB
-  AZTREONAM: SIGNIFICANT CHANGE UP
-  CEFTAZIDIME: SIGNIFICANT CHANGE UP
-  CIPROFLOXACIN: SIGNIFICANT CHANGE UP
-  GENTAMICIN: SIGNIFICANT CHANGE UP
-  PIPERACILLIN/TAZOBACTAM: SIGNIFICANT CHANGE UP
-  TOBRAMYCIN: SIGNIFICANT CHANGE UP
ALBUMIN SERPL ELPH-MCNC: 3 G/DL — LOW (ref 3.3–5)
ALP SERPL-CCNC: 358 U/L — HIGH (ref 40–120)
ALT FLD-CCNC: 78 U/L — HIGH (ref 10–45)
ANION GAP SERPL CALC-SCNC: 18 MMOL/L — HIGH (ref 5–17)
APTT BLD: 39 SEC — HIGH (ref 27.5–37.4)
AST SERPL-CCNC: 41 U/L — HIGH (ref 10–40)
BASE EXCESS BLDA CALC-SCNC: 3.8 MMOL/L — HIGH (ref -2–3)
BASE EXCESS BLDA CALC-SCNC: 4.4 MMOL/L — HIGH (ref -2–3)
BASOPHILS NFR BLD AUTO: 0.1 % — SIGNIFICANT CHANGE UP (ref 0–2)
BILIRUB SERPL-MCNC: 0.6 MG/DL — SIGNIFICANT CHANGE UP (ref 0.2–1.2)
BUN SERPL-MCNC: 58 MG/DL — HIGH (ref 7–23)
CALCIUM SERPL-MCNC: 9 MG/DL — SIGNIFICANT CHANGE UP (ref 8.4–10.5)
CHLORIDE SERPL-SCNC: 90 MMOL/L — LOW (ref 96–108)
CO2 SERPL-SCNC: 25 MMOL/L — SIGNIFICANT CHANGE UP (ref 22–31)
CREAT SERPL-MCNC: 3.63 MG/DL — HIGH (ref 0.5–1.3)
CULTURE RESULTS: SIGNIFICANT CHANGE UP
EOSINOPHIL NFR BLD AUTO: 0.1 % — SIGNIFICANT CHANGE UP (ref 0–6)
GAS PNL BLDA: SIGNIFICANT CHANGE UP
GAS PNL BLDA: SIGNIFICANT CHANGE UP
GLUCOSE BLDC GLUCOMTR-MCNC: 120 MG/DL — HIGH (ref 70–99)
GLUCOSE BLDC GLUCOMTR-MCNC: 174 MG/DL — HIGH (ref 70–99)
GLUCOSE BLDC GLUCOMTR-MCNC: 206 MG/DL — HIGH (ref 70–99)
GLUCOSE BLDC GLUCOMTR-MCNC: 285 MG/DL — HIGH (ref 70–99)
GLUCOSE SERPL-MCNC: 170 MG/DL — HIGH (ref 70–99)
HCO3 BLDA-SCNC: 23 MMOL/L — SIGNIFICANT CHANGE UP (ref 21–28)
HCO3 BLDA-SCNC: 27 MMOL/L — SIGNIFICANT CHANGE UP (ref 21–28)
HCT VFR BLD CALC: 30.1 % — LOW (ref 39–50)
HGB BLD-MCNC: 9.1 G/DL — LOW (ref 13–17)
INR BLD: 2.58 — HIGH (ref 0.88–1.16)
LYMPHOCYTES # BLD AUTO: 6.5 % — LOW (ref 13–44)
MAGNESIUM SERPL-MCNC: 2.2 MG/DL — SIGNIFICANT CHANGE UP (ref 1.6–2.6)
MCHC RBC-ENTMCNC: 26.8 PG — LOW (ref 27–34)
MCHC RBC-ENTMCNC: 30.2 G/DL — LOW (ref 32–36)
MCV RBC AUTO: 88.5 FL — SIGNIFICANT CHANGE UP (ref 80–100)
METHOD TYPE: SIGNIFICANT CHANGE UP
MONOCYTES NFR BLD AUTO: 5.2 % — SIGNIFICANT CHANGE UP (ref 2–14)
NEUTROPHILS NFR BLD AUTO: 88.1 % — HIGH (ref 43–77)
ORGANISM # SPEC MICROSCOPIC CNT: SIGNIFICANT CHANGE UP
ORGANISM # SPEC MICROSCOPIC CNT: SIGNIFICANT CHANGE UP
PCO2 BLDA: 22 MMHG — LOW (ref 35–48)
PCO2 BLDA: 32 MMHG — LOW (ref 35–48)
PH BLDA: 7.54 — HIGH (ref 7.35–7.45)
PH BLDA: 7.64 — CRITICAL HIGH (ref 7.35–7.45)
PHOSPHATE SERPL-MCNC: 3 MG/DL — SIGNIFICANT CHANGE UP (ref 2.5–4.5)
PLATELET # BLD AUTO: 167 K/UL — SIGNIFICANT CHANGE UP (ref 150–400)
PO2 BLDA: 108 MMHG — SIGNIFICANT CHANGE UP (ref 83–108)
PO2 BLDA: 90 MMHG — SIGNIFICANT CHANGE UP (ref 83–108)
POTASSIUM SERPL-MCNC: 5 MMOL/L — SIGNIFICANT CHANGE UP (ref 3.5–5.3)
POTASSIUM SERPL-SCNC: 5 MMOL/L — SIGNIFICANT CHANGE UP (ref 3.5–5.3)
PROT SERPL-MCNC: 8.3 G/DL — SIGNIFICANT CHANGE UP (ref 6–8.3)
PROTHROM AB SERPL-ACNC: 29.2 SEC — HIGH (ref 9.8–12.7)
RBC # BLD: 3.4 M/UL — LOW (ref 4.2–5.8)
RBC # FLD: 16.6 % — SIGNIFICANT CHANGE UP (ref 10.3–16.9)
SAO2 % BLDA: 98 % — SIGNIFICANT CHANGE UP (ref 95–100)
SAO2 % BLDA: 98 % — SIGNIFICANT CHANGE UP (ref 95–100)
SODIUM SERPL-SCNC: 133 MMOL/L — LOW (ref 135–145)
SPECIMEN SOURCE: SIGNIFICANT CHANGE UP
WBC # BLD: 15.2 K/UL — HIGH (ref 3.8–10.5)
WBC # FLD AUTO: 15.2 K/UL — HIGH (ref 3.8–10.5)

## 2018-05-22 PROCEDURE — 71045 X-RAY EXAM CHEST 1 VIEW: CPT | Mod: 26,76

## 2018-05-22 PROCEDURE — 99233 SBSQ HOSP IP/OBS HIGH 50: CPT

## 2018-05-22 PROCEDURE — 99232 SBSQ HOSP IP/OBS MODERATE 35: CPT

## 2018-05-22 PROCEDURE — 90935 HEMODIALYSIS ONE EVALUATION: CPT | Mod: GC

## 2018-05-22 PROCEDURE — 93010 ELECTROCARDIOGRAM REPORT: CPT

## 2018-05-22 RX ORDER — MORPHINE SULFATE 50 MG/1
1 CAPSULE, EXTENDED RELEASE ORAL ONCE
Qty: 0 | Refills: 0 | Status: DISCONTINUED | OUTPATIENT
Start: 2018-05-22 | End: 2018-05-22

## 2018-05-22 RX ORDER — CALCITRIOL 0.5 UG/1
0.25 CAPSULE ORAL DAILY
Qty: 0 | Refills: 0 | Status: DISCONTINUED | OUTPATIENT
Start: 2018-05-22 | End: 2018-06-18

## 2018-05-22 RX ORDER — WARFARIN SODIUM 2.5 MG/1
2 TABLET ORAL ONCE
Qty: 0 | Refills: 0 | Status: COMPLETED | OUTPATIENT
Start: 2018-05-22 | End: 2018-05-22

## 2018-05-22 RX ORDER — LEVETIRACETAM 250 MG/1
250 TABLET, FILM COATED ORAL ONCE
Qty: 0 | Refills: 0 | Status: COMPLETED | OUTPATIENT
Start: 2018-05-22 | End: 2018-05-22

## 2018-05-22 RX ORDER — ACETYLCYSTEINE 200 MG/ML
4 VIAL (ML) MISCELLANEOUS
Qty: 0 | Refills: 0 | Status: DISCONTINUED | OUTPATIENT
Start: 2018-05-22 | End: 2018-07-01

## 2018-05-22 RX ORDER — MODAFINIL 200 MG/1
100 TABLET ORAL DAILY
Qty: 0 | Refills: 0 | Status: DISCONTINUED | OUTPATIENT
Start: 2018-05-22 | End: 2018-05-29

## 2018-05-22 RX ORDER — ERYTHROPOIETIN 10000 [IU]/ML
10000 INJECTION, SOLUTION INTRAVENOUS; SUBCUTANEOUS ONCE
Qty: 0 | Refills: 0 | Status: COMPLETED | OUTPATIENT
Start: 2018-05-22 | End: 2018-05-22

## 2018-05-22 RX ORDER — DOXERCALCIFEROL 2.5 UG/1
2 CAPSULE ORAL ONCE
Qty: 0 | Refills: 0 | Status: COMPLETED | OUTPATIENT
Start: 2018-05-22 | End: 2018-05-22

## 2018-05-22 RX ORDER — INSULIN HUMAN 100 [IU]/ML
8 INJECTION, SOLUTION SUBCUTANEOUS EVERY 6 HOURS
Qty: 0 | Refills: 0 | Status: DISCONTINUED | OUTPATIENT
Start: 2018-05-22 | End: 2018-05-28

## 2018-05-22 RX ADMIN — MIDODRINE HYDROCHLORIDE 10 MILLIGRAM(S): 2.5 TABLET ORAL at 09:31

## 2018-05-22 RX ADMIN — INSULIN HUMAN 5 UNIT(S): 100 INJECTION, SOLUTION SUBCUTANEOUS at 07:14

## 2018-05-22 RX ADMIN — Medication 5 MILLIGRAM(S): at 22:22

## 2018-05-22 RX ADMIN — Medication 1 APPLICATION(S): at 14:26

## 2018-05-22 RX ADMIN — INSULIN HUMAN 2: 100 INJECTION, SOLUTION SUBCUTANEOUS at 07:13

## 2018-05-22 RX ADMIN — INSULIN HUMAN 4: 100 INJECTION, SOLUTION SUBCUTANEOUS at 23:48

## 2018-05-22 RX ADMIN — ESCITALOPRAM OXALATE 5 MILLIGRAM(S): 10 TABLET, FILM COATED ORAL at 14:35

## 2018-05-22 RX ADMIN — MORPHINE SULFATE 1 MILLIGRAM(S): 50 CAPSULE, EXTENDED RELEASE ORAL at 22:19

## 2018-05-22 RX ADMIN — MIDODRINE HYDROCHLORIDE 15 MILLIGRAM(S): 2.5 TABLET ORAL at 06:33

## 2018-05-22 RX ADMIN — Medication 1 TABLET(S): at 14:33

## 2018-05-22 RX ADMIN — WARFARIN SODIUM 2 MILLIGRAM(S): 2.5 TABLET ORAL at 22:23

## 2018-05-22 RX ADMIN — CALCITRIOL 0.25 MICROGRAM(S): 0.5 CAPSULE ORAL at 14:35

## 2018-05-22 RX ADMIN — PANTOPRAZOLE SODIUM 40 MILLIGRAM(S): 20 TABLET, DELAYED RELEASE ORAL at 14:33

## 2018-05-22 RX ADMIN — ERYTHROPOIETIN 10000 UNIT(S): 10000 INJECTION, SOLUTION INTRAVENOUS; SUBCUTANEOUS at 10:47

## 2018-05-22 RX ADMIN — LEVETIRACETAM 500 MILLIGRAM(S): 250 TABLET, FILM COATED ORAL at 14:34

## 2018-05-22 RX ADMIN — INSULIN HUMAN 6: 100 INJECTION, SOLUTION SUBCUTANEOUS at 00:45

## 2018-05-22 RX ADMIN — Medication 1 MILLIGRAM(S): at 14:33

## 2018-05-22 RX ADMIN — CHLORHEXIDINE GLUCONATE 15 MILLILITER(S): 213 SOLUTION TOPICAL at 22:22

## 2018-05-22 RX ADMIN — INSULIN HUMAN 5 UNIT(S): 100 INJECTION, SOLUTION SUBCUTANEOUS at 00:45

## 2018-05-22 RX ADMIN — MIDODRINE HYDROCHLORIDE 15 MILLIGRAM(S): 2.5 TABLET ORAL at 20:36

## 2018-05-22 RX ADMIN — Medication 5 MILLIGRAM(S): at 14:34

## 2018-05-22 RX ADMIN — MODAFINIL 100 MILLIGRAM(S): 200 TABLET ORAL at 14:59

## 2018-05-22 RX ADMIN — INSULIN HUMAN 8 UNIT(S): 100 INJECTION, SOLUTION SUBCUTANEOUS at 18:16

## 2018-05-22 RX ADMIN — Medication 5 MILLILITER(S): at 17:36

## 2018-05-22 RX ADMIN — ATORVASTATIN CALCIUM 80 MILLIGRAM(S): 80 TABLET, FILM COATED ORAL at 22:21

## 2018-05-22 RX ADMIN — Medication 5 MILLIGRAM(S): at 06:35

## 2018-05-22 RX ADMIN — Medication 81 MILLIGRAM(S): at 14:34

## 2018-05-22 RX ADMIN — MORPHINE SULFATE 1 MILLIGRAM(S): 50 CAPSULE, EXTENDED RELEASE ORAL at 22:34

## 2018-05-22 RX ADMIN — Medication 1 APPLICATION(S): at 06:33

## 2018-05-22 RX ADMIN — INSULIN HUMAN 8 UNIT(S): 100 INJECTION, SOLUTION SUBCUTANEOUS at 23:48

## 2018-05-22 RX ADMIN — DOXERCALCIFEROL 2 MICROGRAM(S): 2.5 CAPSULE ORAL at 10:47

## 2018-05-22 RX ADMIN — Medication 15 MILLIGRAM(S): at 22:21

## 2018-05-22 RX ADMIN — MORPHINE SULFATE 1 MILLIGRAM(S): 50 CAPSULE, EXTENDED RELEASE ORAL at 17:35

## 2018-05-22 NOTE — PROGRESS NOTE ADULT - SUBJECTIVE AND OBJECTIVE BOX
INTERVAL HPI/OVERNIGHT EVENTS: none     SUBJECTIVE: Patient seen and examined at bedside. ros difficult to assess, unchanged     OBJECTIVE:    VITAL SIGNS:  ICU Vital Signs Last 24 Hrs  T(C): 37.8 (22 May 2018 05:23), Max: 37.8 (22 May 2018 05:23)  T(F): 100 (22 May 2018 05:23), Max: 100 (22 May 2018 05:23)  HR: 103 (22 May 2018 05:23) (78 - 103)  BP: 107/71 (22 May 2018 05:23) (84/66 - 107/71)  BP(mean): 71 (21 May 2018 16:20) (71 - 83)  ABP: --  ABP(mean): --  RR: 20 (22 May 2018 06:47) (12 - 30)  SpO2: 100% (22 May 2018 06:47) (98% - 100%)    Mode: AC/ CMV (Assist Control/ Continuous Mandatory Ventilation), RR (machine): 10, TV (machine): 500, FiO2: 40, PEEP: 5, ITime: 1, MAP: 12, PIP: 20    05-21 @ 07:01  -  05-22 @ 07:00  --------------------------------------------------------  IN: 50 mL / OUT: 0 mL / NET: 50 mL      CAPILLARY BLOOD GLUCOSE      POCT Blood Glucose.: 174 mg/dL (22 May 2018 06:45)      PHYSICAL EXAM:    PHYSICAL EXAM:  General:on ACVC, tachypneic, comfortable, NAD, sleeping   HEENT: NC/AT; PERRL, anicteric sclera; MMM  Neck: +trach; no secretions  Cardiovascular: +S1/S2; regular and no tachycardia  Respiratory: diminished breath sounds b/l; diffuse b/l crackles; no rhonchi   Gastrointestinal: distended and firm but non-tender and bowel sounds present; PEG in place, no drainage around site, feeds running; no TTP, BSx4  Extremities: WWP; 2+ pitting edema b/l; chronic venous stasis changes bilateral LE, bandage round L ankle and leg   Vascular: palp peripheral pulses b/l  Neurological:  Alert and awake. Appears to respond to name, waxing and waning, occasionally follows commands "squeeze hand'   Skin: Chronic venous stasis changes b/l LE with dressings applied by wound care    MEDICATIONS:  MEDICATIONS  (STANDING):  aspirin  chewable 81 milliGRAM(s) Oral daily  atorvastatin 80 milliGRAM(s) Oral at bedtime  chlorhexidine 0.12% Liquid 15 milliLiter(s) Swish and Spit two times a day  collagenase Ointment 1 Application(s) Topical daily  dextrose 5%. 1000 milliLiter(s) (50 mL/Hr) IV Continuous <Continuous>  dextrose 50% Injectable 12.5 Gram(s) IV Push once  dextrose 50% Injectable 25 Gram(s) IV Push once  dextrose 50% Injectable 25 Gram(s) IV Push once  ergocalciferol Drops 6000 Unit(s) Oral daily  escitalopram 5 milliGRAM(s) Oral daily  folic acid 1 milliGRAM(s) Oral daily  hydrocortisone 15 milliGRAM(s) Oral every 12 hours  insulin regular  human corrective regimen sliding scale   SubCutaneous every 6 hours  insulin regular  human recombinant 5 Unit(s) SubCutaneous every 6 hours  levETIRAcetam  Solution 500 milliGRAM(s) Oral every 24 hours  metoclopramide 5 milliGRAM(s) Oral every 8 hours  midodrine 15 milliGRAM(s) Oral every 8 hours  midodrine 10 milliGRAM(s) Oral once  modafinil 100 milliGRAM(s) Oral daily  multivitamin 1 Tablet(s) Oral daily  pantoprazole   Suspension 40 milliGRAM(s) Oral daily  silver sulfADIAZINE 1% Cream 1 Application(s) Topical daily    MEDICATIONS  (PRN):  acetaminophen    Suspension. 650 milliGRAM(s) Oral every 6 hours PRN Moderate Pain (4 - 6)  acetaminophen  Suppository 650 milliGRAM(s) Rectal every 6 hours PRN For Temp greater than 38 C (100.4 F)  dextrose Gel 1 Dose(s) Oral once PRN Blood Glucose LESS THAN 70 milliGRAM(s)/deciliter  glucagon  Injectable 1 milliGRAM(s) IntraMuscular once PRN Glucose LESS THAN 70 milligrams/deciliter      ALLERGIES:  Allergies    No Known Allergies    Intolerances        LABS:                        9.1    13.0  )-----------( 133      ( 21 May 2018 07:18 )             29.7     05-21    133<L>  |  91<L>  |  47<H>  ----------------------------<  303<H>  5.1   |  27  |  3.03<H>    Ca    8.9      21 May 2018 07:18  Phos  3.5     05-21  Mg     2.0     05-21      PT/INR - ( 21 May 2018 07:18 )   PT: 27.4 sec;   INR: 2.42

## 2018-05-22 NOTE — PROGRESS NOTE ADULT - PROBLEM SELECTOR PLAN 2
DM2 on Januvia at home. HbA1C 8.6. Difficulty controlling glucose levels with multiple episodes of hypoglycemia and hyperglycemia. symptomatically hypoglycemic on glucerna (avoid).    - c/w regular insulin 5U q6 --> will need uptitratin in AM has hyperglycemic  - SSI  - consider ENDO consult DM2 on Januvia at home. HbA1C 8.6. Difficulty controlling glucose levels with multiple episodes of hypoglycemia and hyperglycemia. symptomatically hypoglycemic on glucerna (avoid).    - c/w regular insulin 5U q6 --> will need uptitratin in AM has hyperglycemic  - SSI  - consider ENDO consult outpt

## 2018-05-22 NOTE — PROGRESS NOTE ADULT - PROBLEM SELECTOR PLAN 1
Acute kidney injury due to ischemic ATN requiring ongoing HD treatment via Right chest wall tunnel HD catheter.  Patient was last dialyzed 5/19 with UF of 2.3L UF  Volume status persistently hypervolemic.   Will do HD treatment today for UF and clearances.   Low K/Low phos/renal diet

## 2018-05-22 NOTE — CHART NOTE - NSCHARTNOTEFT_GEN_A_CORE
Change in status:    Patient found to be tachypneic to the mid 30s with diaphoresis. Suctioning with minimal improvement. Change from prev baseline. No temp rectally, other vitals stable. FS of 120. Unclear etiology. EKG showed reduced amplitude but no acute ischemic changes. Beside ultrasound showed no pericardial fluid collection. CXR from AM (when patient had intermittent bout of tachypnea that self resolved) unchanged from prior. ABG notable for PH of 7.64, c/f delirium vs. pain. Unable to assess patient 2/2 baseline AMS. Given 1mg morphine with vent adjustment (elevated PEEP) by pulm. Change in status:    Patient found to be tachypneic to the mid 30s with diaphoresis. Suctioning with minimal improvement. Change from prev baseline. No temp rectally, other vitals stable. FS of 120. Unclear etiology. EKG showed reduced amplitude but no acute ischemic changes. Beside ultrasound showed no pericardial fluid collection. CXR from AM (when patient had intermittent bout of tachypnea that self resolved) unchanged from prior. ABG notable for PH of 7.64, c/f delirium vs. pain. Unable to assess patient 2/2 baseline AMS. Given 1mg morphine with vent adjustment (elevated PEEP) by pulm with significant improvement in discomfort, now 12-23 with no agitation.

## 2018-05-22 NOTE — PROGRESS NOTE ADULT - PROBLEM SELECTOR PLAN 10
VTE: Coumadin dosed nightly, with INR daily checks, goal 2-3  GI PPx: PPI  DNR- Made DNR, family wants escalation of care, pressors if needed.  F: No IVF in setting of HD  E: Replete electrolytes with caution in setting of HD  N: Jevity 1.5 goal rate 62cc/hr, per updated nutrition recs 5/21  Dispo: SD to [facility], likely chronic vent facility + HD, pending SW placement. Dispo plans and SD plans discussed with daughter Cristal over phone today.    **Can consider lab draws on dialysis days only if INR holds at therapeutic range

## 2018-05-22 NOTE — PROGRESS NOTE ADULT - PROBLEM SELECTOR PLAN 1
- Pt with unclear etiology for tachypnea - CXR with unchanged small R effusion, no increase in oxygen requirements or HR, no hypotension, rectal temp 99.7, no increase in sputum production. Airway pressures remain normal, pt does not appear air hungry.   - Unlikely PE given pt has INR of 2.58; has hx of stroke -? central drive for tachypnea? though no change in mental status and no focal deficits/localizing symptoms, no likely acute CNS syndrome.   - ABG with pure respiratory alkalosis and PCO2 22, unable to compensate 2/2 ESRD  - It is possible pt is "autotriggering" vent and autoflow rate should be increased from 2L/min to 3 or 4L/min. When returned to bedside to re-evaluate for auto-flow, pt had already received 1mg morphine and RR much improved. While pain can contribute to tachypnea, no obvious source of pain or discomfort  - If episodes would recur, would adjust autoflow trigger and re-evaluate.

## 2018-05-22 NOTE — PROGRESS NOTE ADULT - SUBJECTIVE AND OBJECTIVE BOX
Patient was seen and evaluated on dialysis.   Patient is tolerating the procedure well.   HR: 103 (05-22-18 @ 05:23)  BP: 107/71 (05-22-18 @ 05:23)  Continue dialysis:   Dialyzer: Revaclear 400          QB: 400        QD: 500 2K+  Goal UF 2.5 to 3 Kg over 4 Hours

## 2018-05-22 NOTE — PROGRESS NOTE ADULT - SUBJECTIVE AND OBJECTIVE BOX
Re-consult    Pulmonary reconsulted for tachypnea on ventilator. Pt with prolonged hospitalization s/p trach and peg, had been tolerating trach collar on 7lachman. Began getting more tachypneic over the last few days requiring pt to be placed back on CPAP and ultimately VCAC for "respiratory muscle rest". He has also been intermittently been getting ativan for "agitation" when he is tachypneic. Pt noted to have RR 30-40 on VCAC this morning, CXR with unchanged pleural effusion, pt without fever, tachycardia, or hypoxia. Episode resolved on it's own and pt went for dialysis. After returning from dialysis this evening, again noted to have episode of tachypnea to 30-40's. PIP 25, PPlat 25, , PEEP 5. Despite the tachypnea, pt denied feeling short of breath or having pain. No increase in his sputum production, suctioned at bedside with minimal secretions. Difficult to obtain history from patient as he only answers questions intermittently (baseline per primary team).         MEDICATIONS:  Pulmonary:  acetylcysteine 20% Inhalation 5 milliLiter(s) Inhalation two times a day    Antimicrobials:    Anticoagulants:  aspirin  chewable 81 milliGRAM(s) Oral daily  warfarin 2 milliGRAM(s) Oral once    Cardiac:  midodrine 15 milliGRAM(s) Oral every 8 hours      Allergies    No Known Allergies    Intolerances        Vital Signs Last 24 Hrs  T(C): 36.7 (22 May 2018 14:45), Max: 37.8 (22 May 2018 05:23)  T(F): 98 (22 May 2018 14:45), Max: 100 (22 May 2018 05:23)  HR: 91 (22 May 2018 14:45) (82 - 103)  BP: 107/73 (22 May 2018 14:45) (96/65 - 107/73)  BP(mean): --  RR: 20 (22 May 2018 14:45) (12 - 30)  SpO2: 100% (22 May 2018 14:45) (96% - 100%)    05-21 @ 07:01  -  05-22 @ 07:00  --------------------------------------------------------  IN: 50 mL / OUT: 0 mL / NET: 50 mL    05-22 @ 07:01  -  05-22 @ 19:03  --------------------------------------------------------  IN: 0 mL / OUT: 2500 mL / NET: -2500 mL      Mode: AC/ CMV (Assist Control/ Continuous Mandatory Ventilation)  RR (machine): 12  TV (machine): 500  FiO2: 40  PEEP: 5  ITime: 1  MAP: 16  PIP: 27      PHYSICAL EXAM:    General: S/P trach, tachypneic without use of accessory muscles  Eyes: attempted to open eyes, pt continually closing eyes against resistance   Neck: Supple; no JVD  Respiratory: B/L rhonchi at bases, good air entry B/L  Cardiovascular: S1S2 RRR  Gastrointestinal: + PEG tube in place clean/dry/intact, nontender  Extremities: + dependent edema  Neurological: does not follow all commands, does not answer alert/oriented questioning; muscle tremors of chest wall  Skin: cool extremities, hyperpigmented skin over shins    LABS:  ABG - ( 22 May 2018 18:54 )  pH, Arterial: 7.54  pH, Blood: x     /  pCO2: 32    /  pO2: 108   / HCO3: 27    / Base Excess: 4.4   /  SaO2: 98                  CBC Full  -  ( 22 May 2018 07:34 )  WBC Count : 15.2 K/uL  Hemoglobin : 9.1 g/dL  Hematocrit : 30.1 %  Platelet Count - Automated : 167 K/uL  Mean Cell Volume : 88.5 fL  Mean Cell Hemoglobin : 26.8 pg  Mean Cell Hemoglobin Concentration : 30.2 g/dL  Auto Neutrophil % : 88.1 %  Auto Lymphocyte % : 6.5 %  Auto Monocyte % : 5.2 %  Auto Eosinophil % : 0.1 %  Auto Basophil % : 0.1 %    05-22    133<L>  |  90<L>  |  58<H>  ----------------------------<  170<H>  5.0   |  25  |  3.63<H>    Ca    9.0      22 May 2018 07:34  Phos  3.0     05-22  Mg     2.2     05-22    TPro  8.3  /  Alb  3.0<L>  /  TBili  0.6  /  DBili  x   /  AST  41<H>  /  ALT  78<H>  /  AlkPhos  358<H>  05-22    PT/INR - ( 22 May 2018 07:34 )   PT: 29.2 sec;   INR: 2.58          PTT - ( 22 May 2018 07:34 )  PTT:39.0 sec                  RADIOLOGY & ADDITIONAL STUDIES (The following images were personally reviewed):

## 2018-05-22 NOTE — PROGRESS NOTE ADULT - SUBJECTIVE AND OBJECTIVE BOX
Patient is a 63y Male seen and evaluated at bedside. Patient lying in bed in no acute distress lying in bed remains on ventilatory support. Last HD treatment on 5/19 with 2.5L UF. Interval transfer to floor further treatment.    acetaminophen    Suspension. 650 every 6 hours PRN  acetaminophen  Suppository 650 every 6 hours PRN  aspirin  chewable 81 daily  atorvastatin 80 at bedtime  chlorhexidine 0.12% Liquid 15 two times a day  collagenase Ointment 1 daily  dextrose 5%. 1000 <Continuous>  dextrose 50% Injectable 12.5 once  dextrose 50% Injectable 25 once  dextrose 50% Injectable 25 once  dextrose Gel 1 once PRN  doxercalciferol Injectable 2 once  epoetin amy Injectable 33502 once  ergocalciferol Drops 6000 daily  escitalopram 5 daily  folic acid 1 daily  glucagon  Injectable 1 once PRN  hydrocortisone 15 every 12 hours  insulin regular  human corrective regimen sliding scale  every 6 hours  insulin regular  human recombinant 5 every 6 hours  levETIRAcetam  Solution 500 every 24 hours  metoclopramide 5 every 8 hours  midodrine 15 every 8 hours  modafinil 100 daily  multivitamin 1 daily  pantoprazole   Suspension 40 daily  silver sulfADIAZINE 1% Cream 1 daily      Allergies    No Known Allergies    Intolerances        T(C): , Max: 37.8 (05-22-18 @ 05:23)  T(F): , Max: 100 (05-22-18 @ 05:23)  HR: 103 (05-22-18 @ 05:23)  BP: 107/71 (05-22-18 @ 05:23)  BP(mean): 71 (05-21-18 @ 16:20)  RR: 20 (05-22-18 @ 06:47)  SpO2: 100% (05-22-18 @ 06:47)  Wt(kg): --    05-21 @ 07:01  -  05-22 @ 07:00  --------------------------------------------------------  IN: 50 mL / OUT: 0 mL / NET: 50 mL  Review of Systems:  Limited. Patient off ventilatory support on trach collar at present.    PHYSICAL EXAM:  GENERAL: Ill appearing, awake but confused, disoriented in no acute distress at present  HEAD:  Atraumatic, Normocephalic,   EYES: Bilateral conjuctival and scleral pallor+nt  Oral cavity: Oral mucosa moist and pale  NECK: Neck supple, Mild JVP, tracheostomy present.  CHEST/LUNG:  Bilateral decreased breath sounds, Bibasilar rales and crepitatiosn+nt, Right>left, no wheezing  HEART: Regular rate and rhythm. HOMER II/VI at LPSB, No gallop, no rub   ABDOMEN: Soft, Nontender, nondistended, PEG tube present. BS+nt, No flank tenderness.   EXTREMITIES: Bilateral thigh and leg edema++nt, No clubbing, cyanosis  Neurology: AAOx1-2, awake and answers few verbal commands  SKIN: No rashes or lesions    ACCESS: Right chest wall tunnel HD catheter.    LABS:                        9.1    15.2  )-----------( 167      ( 22 May 2018 07:34 )             30.1     05-22    133<L>  |  90<L>  |  58<H>  ----------------------------<  170<H>  5.0   |  25  |  3.63<H>    Ca    9.0      22 May 2018 07:34  Phos  3.0     05-22  Mg     2.2     05-22    TPro  8.3  /  Alb  3.0<L>  /  TBili  0.6  /  DBili  x   /  AST  41<H>  /  ALT  78<H>  /  AlkPhos  358<H>  05-22      PT/INR - ( 22 May 2018 07:34 )   PT: 29.2 sec;   INR: 2.58          PTT - ( 22 May 2018 07:34 )  PTT:39.0 sec          RADIOLOGY & ADDITIONAL STUDIES:  < from: Xray Chest 1 View- PORTABLE-Routine (05.20.18 @ 10:39) >    EXAM:  XR CHEST PORTABLE ROUTINE 1V                          PROCEDURE DATE:  05/20/2018          INTERPRETATION:  Clinical History: Fluid overload    Portable examination demonstrates cardiomegaly. No interval change   position remaining support devices in comparison to prior examination of   the chest 5/19/2018. Bilateral effusions. Underlying congestion and/or   infiltrates cannot be excluded.    Impression: No significant change lung pathology in comparison to prior   examination of the chest 5/19/2018            "Thank you for the opportunity to participate in the care of this   patient."        RADHA MAXWELL M.D., ATTENDING RADIOLOGIST  This document has been electronically signed. May 20 2018  3:31PM                  < end of copied text >

## 2018-05-23 LAB
ALBUMIN SERPL ELPH-MCNC: 2.6 G/DL — LOW (ref 3.3–5)
ALP SERPL-CCNC: 388 U/L — HIGH (ref 40–120)
ALT FLD-CCNC: 93 U/L — HIGH (ref 10–45)
ANION GAP SERPL CALC-SCNC: 15 MMOL/L — SIGNIFICANT CHANGE UP (ref 5–17)
AST SERPL-CCNC: 76 U/L — HIGH (ref 10–40)
BILIRUB SERPL-MCNC: 0.7 MG/DL — SIGNIFICANT CHANGE UP (ref 0.2–1.2)
BUN SERPL-MCNC: 40 MG/DL — HIGH (ref 7–23)
CALCIUM SERPL-MCNC: 8.7 MG/DL — SIGNIFICANT CHANGE UP (ref 8.4–10.5)
CHLORIDE SERPL-SCNC: 93 MMOL/L — LOW (ref 96–108)
CO2 SERPL-SCNC: 26 MMOL/L — SIGNIFICANT CHANGE UP (ref 22–31)
CREAT SERPL-MCNC: 2.8 MG/DL — HIGH (ref 0.5–1.3)
GLUCOSE BLDC GLUCOMTR-MCNC: 123 MG/DL — HIGH (ref 70–99)
GLUCOSE BLDC GLUCOMTR-MCNC: 124 MG/DL — HIGH (ref 70–99)
GLUCOSE BLDC GLUCOMTR-MCNC: 164 MG/DL — HIGH (ref 70–99)
GLUCOSE BLDC GLUCOMTR-MCNC: 228 MG/DL — HIGH (ref 70–99)
GLUCOSE SERPL-MCNC: 225 MG/DL — HIGH (ref 70–99)
HCT VFR BLD CALC: 32.4 % — LOW (ref 39–50)
HGB BLD-MCNC: 9.5 G/DL — LOW (ref 13–17)
INR BLD: 2.1 — HIGH (ref 0.88–1.16)
MAGNESIUM SERPL-MCNC: 2.1 MG/DL — SIGNIFICANT CHANGE UP (ref 1.6–2.6)
MCHC RBC-ENTMCNC: 26.8 PG — LOW (ref 27–34)
MCHC RBC-ENTMCNC: 29.3 G/DL — LOW (ref 32–36)
MCV RBC AUTO: 91.3 FL — SIGNIFICANT CHANGE UP (ref 80–100)
PHOSPHATE SERPL-MCNC: 3.4 MG/DL — SIGNIFICANT CHANGE UP (ref 2.5–4.5)
PLATELET # BLD AUTO: 136 K/UL — LOW (ref 150–400)
POTASSIUM SERPL-MCNC: 5.1 MMOL/L — SIGNIFICANT CHANGE UP (ref 3.5–5.3)
POTASSIUM SERPL-SCNC: 5.1 MMOL/L — SIGNIFICANT CHANGE UP (ref 3.5–5.3)
PROT SERPL-MCNC: 8.1 G/DL — SIGNIFICANT CHANGE UP (ref 6–8.3)
PROTHROM AB SERPL-ACNC: 23.7 SEC — HIGH (ref 9.8–12.7)
RBC # BLD: 3.55 M/UL — LOW (ref 4.2–5.8)
RBC # FLD: 17.1 % — HIGH (ref 10.3–16.9)
SODIUM SERPL-SCNC: 134 MMOL/L — LOW (ref 135–145)
WBC # BLD: 13.6 K/UL — HIGH (ref 3.8–10.5)
WBC # FLD AUTO: 13.6 K/UL — HIGH (ref 3.8–10.5)

## 2018-05-23 PROCEDURE — 99233 SBSQ HOSP IP/OBS HIGH 50: CPT | Mod: GC

## 2018-05-23 PROCEDURE — 90935 HEMODIALYSIS ONE EVALUATION: CPT | Mod: GC

## 2018-05-23 RX ORDER — HYDROMORPHONE HYDROCHLORIDE 2 MG/ML
0.25 INJECTION INTRAMUSCULAR; INTRAVENOUS; SUBCUTANEOUS ONCE
Qty: 0 | Refills: 0 | Status: DISCONTINUED | OUTPATIENT
Start: 2018-05-23 | End: 2018-05-23

## 2018-05-23 RX ORDER — MIDODRINE HYDROCHLORIDE 2.5 MG/1
10 TABLET ORAL ONCE
Qty: 0 | Refills: 0 | Status: COMPLETED | OUTPATIENT
Start: 2018-05-23 | End: 2018-05-23

## 2018-05-23 RX ORDER — WARFARIN SODIUM 2.5 MG/1
2.5 TABLET ORAL ONCE
Qty: 0 | Refills: 0 | Status: COMPLETED | OUTPATIENT
Start: 2018-05-23 | End: 2018-05-23

## 2018-05-23 RX ADMIN — INSULIN HUMAN 2: 100 INJECTION, SOLUTION SUBCUTANEOUS at 12:05

## 2018-05-23 RX ADMIN — Medication 81 MILLIGRAM(S): at 12:00

## 2018-05-23 RX ADMIN — Medication 1 APPLICATION(S): at 06:56

## 2018-05-23 RX ADMIN — Medication 1 MILLIGRAM(S): at 12:02

## 2018-05-23 RX ADMIN — LEVETIRACETAM 500 MILLIGRAM(S): 250 TABLET, FILM COATED ORAL at 15:51

## 2018-05-23 RX ADMIN — Medication 15 MILLIGRAM(S): at 22:25

## 2018-05-23 RX ADMIN — PANTOPRAZOLE SODIUM 40 MILLIGRAM(S): 20 TABLET, DELAYED RELEASE ORAL at 12:02

## 2018-05-23 RX ADMIN — WARFARIN SODIUM 2.5 MILLIGRAM(S): 2.5 TABLET ORAL at 22:26

## 2018-05-23 RX ADMIN — CHLORHEXIDINE GLUCONATE 15 MILLILITER(S): 213 SOLUTION TOPICAL at 22:25

## 2018-05-23 RX ADMIN — Medication 5 MILLIGRAM(S): at 15:51

## 2018-05-23 RX ADMIN — INSULIN HUMAN 4: 100 INJECTION, SOLUTION SUBCUTANEOUS at 07:06

## 2018-05-23 RX ADMIN — ESCITALOPRAM OXALATE 5 MILLIGRAM(S): 10 TABLET, FILM COATED ORAL at 12:00

## 2018-05-23 RX ADMIN — INSULIN HUMAN 8 UNIT(S): 100 INJECTION, SOLUTION SUBCUTANEOUS at 07:06

## 2018-05-23 RX ADMIN — INSULIN HUMAN 8 UNIT(S): 100 INJECTION, SOLUTION SUBCUTANEOUS at 12:06

## 2018-05-23 RX ADMIN — HYDROMORPHONE HYDROCHLORIDE 0.25 MILLIGRAM(S): 2 INJECTION INTRAMUSCULAR; INTRAVENOUS; SUBCUTANEOUS at 00:54

## 2018-05-23 RX ADMIN — MIDODRINE HYDROCHLORIDE 15 MILLIGRAM(S): 2.5 TABLET ORAL at 19:41

## 2018-05-23 RX ADMIN — Medication 5 MILLILITER(S): at 19:42

## 2018-05-23 RX ADMIN — MIDODRINE HYDROCHLORIDE 15 MILLIGRAM(S): 2.5 TABLET ORAL at 12:01

## 2018-05-23 RX ADMIN — HYDROMORPHONE HYDROCHLORIDE 0.25 MILLIGRAM(S): 2 INJECTION INTRAMUSCULAR; INTRAVENOUS; SUBCUTANEOUS at 00:39

## 2018-05-23 RX ADMIN — Medication 5 MILLIGRAM(S): at 22:25

## 2018-05-23 RX ADMIN — Medication 1 APPLICATION(S): at 12:04

## 2018-05-23 RX ADMIN — CALCITRIOL 0.25 MICROGRAM(S): 0.5 CAPSULE ORAL at 12:05

## 2018-05-23 RX ADMIN — Medication 1 TABLET(S): at 12:01

## 2018-05-23 RX ADMIN — MODAFINIL 100 MILLIGRAM(S): 200 TABLET ORAL at 12:01

## 2018-05-23 RX ADMIN — INSULIN HUMAN 8 UNIT(S): 100 INJECTION, SOLUTION SUBCUTANEOUS at 19:42

## 2018-05-23 RX ADMIN — ATORVASTATIN CALCIUM 80 MILLIGRAM(S): 80 TABLET, FILM COATED ORAL at 22:25

## 2018-05-23 RX ADMIN — CHLORHEXIDINE GLUCONATE 15 MILLILITER(S): 213 SOLUTION TOPICAL at 11:58

## 2018-05-23 RX ADMIN — MIDODRINE HYDROCHLORIDE 10 MILLIGRAM(S): 2.5 TABLET ORAL at 15:51

## 2018-05-23 RX ADMIN — MIDODRINE HYDROCHLORIDE 15 MILLIGRAM(S): 2.5 TABLET ORAL at 03:48

## 2018-05-23 RX ADMIN — Medication 5 MILLILITER(S): at 06:55

## 2018-05-23 RX ADMIN — Medication 15 MILLIGRAM(S): at 11:58

## 2018-05-23 RX ADMIN — Medication 5 MILLIGRAM(S): at 06:57

## 2018-05-23 NOTE — CONSULT NOTE ADULT - SUBJECTIVE AND OBJECTIVE BOX
HPI:  62 yo M history of HFrEF 10-15% 2/2 ischemic cardiomyopathy, MI , s/p AICD vs PPM, ?Afib, Hypertension, Diabetes Mellitus Type 2 on insulin, CKD?and gout, who presents with a chief complaint of generalized weakness. Pt wad admitted to [facility] 2 months ago for gout and was sent to rehab. He was discharged home mid Feb with VNS. In the past 2 weeks patient has noted increased leg pain and weakness bilaterally. In the last few days, he has also experienced generalized weakness prompting him to come to ER.   In ER, noted to have t max of 102, SBP 70s, leukocytosis to 32.8, Lactate of 6.6, Acute renal failure with severe metabolic derangements. Given 3.5L fluids and Vanc/Zosyn. MICU consulted. (10 Mar 2018 18:51)      VASCULAR SURGERY ADDENDUM: Vascular Surgery consulted for AVF creation in anticipation of dispo.  Pt evaluated bedside--nonverbal.   Per resident, pt responds to stimuli though is nonverbal at baseline. Daughter is HCP.     PAST MEDICAL & SURGICAL HISTORY:  Type 2 diabetes mellitus with diabetic peripheral angiopathy without gangrene, with long-term curren  Essential hypertension, benign  Gout  Pacemaker  Chronic systolic heart failure  Myocardial infarction  No significant past surgical history      MEDICATIONS  (STANDING):  acetylcysteine 20% Inhalation 5 milliLiter(s) Inhalation two times a day  aspirin  chewable 81 milliGRAM(s) Oral daily  atorvastatin 80 milliGRAM(s) Oral at bedtime  calcitriol  Solution 0.25 MICROGram(s) Oral daily  chlorhexidine 0.12% Liquid 15 milliLiter(s) Swish and Spit two times a day  collagenase Ointment 1 Application(s) Topical daily  dextrose 5%. 1000 milliLiter(s) (50 mL/Hr) IV Continuous <Continuous>  dextrose 50% Injectable 12.5 Gram(s) IV Push once  dextrose 50% Injectable 25 Gram(s) IV Push once  dextrose 50% Injectable 25 Gram(s) IV Push once  escitalopram 5 milliGRAM(s) Oral daily  folic acid 1 milliGRAM(s) Oral daily  hydrocortisone 15 milliGRAM(s) Oral every 12 hours  insulin regular  human corrective regimen sliding scale   SubCutaneous every 6 hours  insulin regular  human recombinant 8 Unit(s) SubCutaneous every 6 hours  levETIRAcetam  Solution 500 milliGRAM(s) Oral every 24 hours  metoclopramide 5 milliGRAM(s) Oral every 8 hours  midodrine 15 milliGRAM(s) Oral every 8 hours  modafinil 100 milliGRAM(s) Oral daily  multivitamin 1 Tablet(s) Oral daily  pantoprazole   Suspension 40 milliGRAM(s) Oral daily  silver sulfADIAZINE 1% Cream 1 Application(s) Topical daily  warfarin 2.5 milliGRAM(s) Oral once    MEDICATIONS  (PRN):  acetaminophen    Suspension. 650 milliGRAM(s) Oral every 6 hours PRN Moderate Pain (4 - 6)  dextrose Gel 1 Dose(s) Oral once PRN Blood Glucose LESS THAN 70 milliGRAM(s)/deciliter  glucagon  Injectable 1 milliGRAM(s) IntraMuscular once PRN Glucose LESS THAN 70 milligrams/deciliter      Allergies    No Known Allergies    Intolerances        SOCIAL HISTORY:    FAMILY HISTORY:      Vital Signs Last 24 Hrs  T(C): 36.7 (23 May 2018 15:36), Max: 37.7 (23 May 2018 00:32)  T(F): 98 (23 May 2018 15:36), Max: 99.9 (23 May 2018 00:32)  HR: 91 (23 May 2018 15:36) (77 - 103)  BP: 99/59 (23 May 2018 15:36) (90/64 - 111/69)  BP(mean): --  RR: 18 (23 May 2018 15:36) (18 - 29)  SpO2: 98% (23 May 2018 15:36) (98% - 100%)    PHYSICAL EXAM  Gen: NAD, resting comfortably in bed. Does not engage with interview. Diaphoretic  Neuro: Responsive only to physical stimuli  Pulm: nonlabored breathing. trached  C/V: RRR, S4?  Right sided HD catheter      LABS:                        9.5    13.6  )-----------( 136      ( 23 May 2018 06:47 )             32.4     05-23    134<L>  |  93<L>  |  40<H>  ----------------------------<  225<H>  5.1   |  26  |  2.80<H>    Ca    8.7      23 May 2018 06:49  Phos  3.4     05-23  Mg     2.1     05-23    TPro  8.1  /  Alb  2.6<L>  /  TBili  0.7  /  DBili  x   /  AST  76<H>  /  ALT  93<H>  /  AlkPhos  388<H>  05-23    PT/INR - ( 23 May 2018 06:47 )   PT: 23.7 sec;   INR: 2.10          PTT - ( 22 May 2018 07:34 )  PTT:39.0 sec      RADIOLOGY & ADDITIONAL STUDIES:

## 2018-05-23 NOTE — CONSULT NOTE ADULT - ASSESSMENT
63yoM DNR with HF (EF 10-15%), MI s/p AICD/PPM, AFib?, HTN, DM, CKD newly on HD, gout, admitted with cellulitis c/b septic shock now trach/peg-dependent. Vascular Surgery consulted for AVF placement in anticipation of dispo needs    Please obtain vein mapping BUE  Please place pink band on non-dominant hand--no IVs, venipuncture, BP  Will need to discuss goals of care with daughter 63yoM DNR with HF (EF 10-15%), MI s/p AICD/PPM, AFib?, HTN, DM, CKD newly on HD, gout, admitted with cellulitis c/b septic shock now trach/peg-dependent. Vascular Surgery consulted for AVF placement in anticipation of dispo needs    Please obtain vein mapping BUE  Please place pink band on non-dominant hand--no IVs, venipuncture, BP  Will need to discuss goals of care with daughter  Patient may follow up as outpatient with Dr Duarte for AVF planning as appropriate. 63yoM DNR with HF (EF 10-15%), MI s/p AICD/PPM, AFib?, HTN, DM, CKD newly on HD, gout, admitted with cellulitis c/b septic shock now trach/peg-dependent. Vascular Surgery consulted for AVF placement in anticipation of dispo needs    Please obtain vein mapping BUE  Please place pink band on non-dominant hand--no IVs, venipuncture, BP  Patient may follow up as outpatient with Dr Duarte for AVF creation planning @[phone]      Signing off reconsult as needed [phone]

## 2018-05-23 NOTE — PROGRESS NOTE ADULT - PROBLEM SELECTOR PLAN 2
DM2 on Januvia at home. HbA1C 8.6. Difficulty controlling glucose levels with multiple episodes of hypoglycemia and hyperglycemia. symptomatically hypoglycemic on glucerna (avoid).    - c/w regular insulin 8U q6 --> titrate as needed  - SSI

## 2018-05-23 NOTE — PROGRESS NOTE ADULT - ATTENDING COMMENTS
still hypervolemic and vent dependent  but seems improving --has been tolerating HD  will do rx for further UF as tolerated

## 2018-05-23 NOTE — PROGRESS NOTE ADULT - SUBJECTIVE AND OBJECTIVE BOX
INTERVAL HPI/OVERNIGHT EVENTS: given morphine 1mg and dialudid 0.25 IV for tachypnea with resolution. No autotrigger per respiratory.     SUBJECTIVE: Patient seen and examined at bedside.    OBJECTIVE:    VITAL SIGNS:  ICU Vital Signs Last 24 Hrs  T(C): 36.4 (23 May 2018 06:52), Max: 37.7 (23 May 2018 00:32)  T(F): 97.5 (23 May 2018 06:52), Max: 99.9 (23 May 2018 00:32)  HR: 97 (23 May 2018 06:52) (86 - 103)  BP: 111/69 (23 May 2018 06:52) (93/62 - 111/69)  BP(mean): --  ABP: --  ABP(mean): --  RR: 18 (23 May 2018 06:52) (18 - 29)  SpO2: 100% (23 May 2018 06:52) (96% - 100%)    Mode: AC/ CMV (Assist Control/ Continuous Mandatory Ventilation), RR (machine): 10, TV (machine): 500, FiO2: 40, PEEP: 5, ITime: 1, MAP: 11, PIP: 22    05-22 @ 07:01  -  05-23 @ 07:00  --------------------------------------------------------  IN: 0 mL / OUT: 2500 mL / NET: -2500 mL      CAPILLARY BLOOD GLUCOSE      POCT Blood Glucose.: 228 mg/dL (23 May 2018 07:02)      PHYSICAL EXAM:    General:on ACVC, tachypneic, comfortable, NAD, sleeping   HEENT: NC/AT; PERRL, anicteric sclera; MMM  Neck: +trach; no secretions  Cardiovascular: +S1/S2; regular and no tachycardia  Respiratory: diminished breath sounds b/l; diffuse b/l crackles; no rhonchi   Gastrointestinal: distended and firm but non-tender and bowel sounds present; PEG in place, no drainage around site, feeds running; no TTP, BSx4  Extremities: WWP; 2+ pitting edema b/l; chronic venous stasis changes bilateral LE, bandage round L ankle and leg   Vascular: palp peripheral pulses b/l  Neurological:  Alert and awake. Appears to respond to name, waxing and waning, occasionally follows commands "squeeze hand'   Skin: Chronic venous stasis changes b/l LE with dressings applied by wound care      MEDICATIONS:  MEDICATIONS  (STANDING):  acetylcysteine 20% Inhalation 5 milliLiter(s) Inhalation two times a day  aspirin  chewable 81 milliGRAM(s) Oral daily  atorvastatin 80 milliGRAM(s) Oral at bedtime  calcitriol  Solution 0.25 MICROGram(s) Oral daily  chlorhexidine 0.12% Liquid 15 milliLiter(s) Swish and Spit two times a day  collagenase Ointment 1 Application(s) Topical daily  dextrose 5%. 1000 milliLiter(s) (50 mL/Hr) IV Continuous <Continuous>  dextrose 50% Injectable 12.5 Gram(s) IV Push once  dextrose 50% Injectable 25 Gram(s) IV Push once  dextrose 50% Injectable 25 Gram(s) IV Push once  escitalopram 5 milliGRAM(s) Oral daily  folic acid 1 milliGRAM(s) Oral daily  hydrocortisone 15 milliGRAM(s) Oral every 12 hours  insulin regular  human corrective regimen sliding scale   SubCutaneous every 6 hours  insulin regular  human recombinant 8 Unit(s) SubCutaneous every 6 hours  levETIRAcetam  Solution 500 milliGRAM(s) Oral every 24 hours  metoclopramide 5 milliGRAM(s) Oral every 8 hours  midodrine 15 milliGRAM(s) Oral every 8 hours  modafinil 100 milliGRAM(s) Oral daily  multivitamin 1 Tablet(s) Oral daily  pantoprazole   Suspension 40 milliGRAM(s) Oral daily  silver sulfADIAZINE 1% Cream 1 Application(s) Topical daily    MEDICATIONS  (PRN):  acetaminophen    Suspension. 650 milliGRAM(s) Oral every 6 hours PRN Moderate Pain (4 - 6)  dextrose Gel 1 Dose(s) Oral once PRN Blood Glucose LESS THAN 70 milliGRAM(s)/deciliter  glucagon  Injectable 1 milliGRAM(s) IntraMuscular once PRN Glucose LESS THAN 70 milligrams/deciliter      ALLERGIES:  Allergies    No Known Allergies    Intolerances        LABS:                        9.5    13.6  )-----------( 136      ( 23 May 2018 06:47 )             32.4     05-23    134<L>  |  93<L>  |  40<H>  ----------------------------<  225<H>  5.1   |  26  |  2.80<H>    Ca    8.7      23 May 2018 06:49  Phos  3.4     05-23  Mg     2.1     05-23    TPro  8.1  /  Alb  2.6<L>  /  TBili  0.7  /  DBili  x   /  AST  76<H>  /  ALT  93<H>  /  AlkPhos  388<H>  05-23    PT/INR - ( 23 May 2018 06:47 )   PT: 23.7 sec;   INR: 2.10          PTT - ( 22 May 2018 07:34 )  PTT:39.0 sec

## 2018-05-23 NOTE — PROGRESS NOTE ADULT - SUBJECTIVE AND OBJECTIVE BOX
Patient is a 63y Male seen and evaluated at bedside. Patient lying in bed in no acute distress at present. Last HD on 5/22 with UF 2.5L. Tolerated procedure well. Volume status still remains hypevolemic but improving. Chest xray with right pleural effusion.   Patient still remains on ventilatory support at present.    acetaminophen    Suspension. 650 every 6 hours PRN  acetylcysteine 20% Inhalation 5 two times a day  aspirin  chewable 81 daily  atorvastatin 80 at bedtime  calcitriol  Solution 0.25 daily  chlorhexidine 0.12% Liquid 15 two times a day  collagenase Ointment 1 daily  dextrose 5%. 1000 <Continuous>  dextrose 50% Injectable 12.5 once  dextrose 50% Injectable 25 once  dextrose 50% Injectable 25 once  dextrose Gel 1 once PRN  escitalopram 5 daily  folic acid 1 daily  glucagon  Injectable 1 once PRN  hydrocortisone 15 every 12 hours  insulin regular  human corrective regimen sliding scale  every 6 hours  insulin regular  human recombinant 8 every 6 hours  levETIRAcetam  Solution 500 every 24 hours  metoclopramide 5 every 8 hours  midodrine 15 every 8 hours  modafinil 100 daily  multivitamin 1 daily  pantoprazole   Suspension 40 daily  silver sulfADIAZINE 1% Cream 1 daily  warfarin 2.5 once      Allergies    No Known Allergies    Intolerances        T(C): , Max: 37.7 (05-23-18 @ 00:32)  T(F): , Max: 99.9 (05-23-18 @ 00:32)  HR: 77 (05-23-18 @ 08:53)  BP: 90/64 (05-23-18 @ 08:53)  BP(mean): --  RR: 18 (05-23-18 @ 08:53)  SpO2: 100% (05-23-18 @ 08:53)  Wt(kg): --    05-22 @ 07:01  -  05-23 @ 07:00  --------------------------------------------------------  IN: 0 mL / OUT: 2500 mL / NET: -2500 mL    Review of Systems:  Limited. Patient off ventilatory support ventilatory support    PHYSICAL EXAM:  GENERAL: Ill appearing, awake but confused, disoriented in no acute distress at present  HEAD:  Atraumatic, Normocephalic,   EYES: Bilateral conjuctival and scleral pallor+nt  Oral cavity: Oral mucosa moist and pale  NECK: Neck supple, Mild JVP, tracheostomy present.  CHEST/LUNG:  Bilateral decreased breath sounds, Bibasilar rales and crepitatiosn+nt, Right>left, no wheezing  HEART: Regular rate and rhythm. HOMER II/VI at LPSB, No gallop, no rub   ABDOMEN: Soft, Nontender, nondistended, PEG tube present. BS+nt, No flank tenderness.   EXTREMITIES: Bilateral thigh and leg edema++nt, No clubbing, cyanosis  Neurology: AAOx1-2, awake and answers few verbal commands  SKIN: No rashes or lesions    ACCESS: Right chest wall tunnel HD catheter.          LABS:                        9.5    13.6  )-----------( 136      ( 23 May 2018 06:47 )             32.4     05-23    134<L>  |  93<L>  |  40<H>  ----------------------------<  225<H>  5.1   |  26  |  2.80<H>    Ca    8.7      23 May 2018 06:49  Phos  3.4     05-23  Mg     2.1     05-23    TPro  8.1  /  Alb  2.6<L>  /  TBili  0.7  /  DBili  x   /  AST  76<H>  /  ALT  93<H>  /  AlkPhos  388<H>  05-23      PT/INR - ( 23 May 2018 06:47 )   PT: 23.7 sec;   INR: 2.10          PTT - ( 22 May 2018 07:34 )  PTT:39.0 sec          RADIOLOGY & ADDITIONAL STUDIES:

## 2018-05-23 NOTE — PROGRESS NOTE ADULT - PROBLEM SELECTOR PLAN 10
VTE: Coumadin dosed nightly, with INR daily checks, goal 2-3  GI PPx: PPI  DNR- Made DNR, family wants escalation of care, pressors if needed.  F: No IVF in setting of HD  E: Replete electrolytes with caution in setting of HD  N: Jevity 1.5 goal rate 62cc/hr, per updated nutrition recs 5/21  Dispo: SD to [facility], likely chronic vent facility + HD, pending SW placement. Dispo plans and SD plans discussed with daughter Cristal over phone today.

## 2018-05-23 NOTE — PROGRESS NOTE ADULT - PROBLEM SELECTOR PLAN 1
Acute respiratory failure in setting of septic and cardiogenic shock, with trach placed on 4/5/18 for persistent, now chronic respiratory failure. CXR showing effusions, tolerated HD 5/19 2.3L removed w/o albumin. Now back on ACVC, though per 7L team not concerned for new process.   - wean to CPAP/trach collar as tolerated  - f/u sputum culture, growing GNR, though pt not meeting sepsis criteria so unlikely to have active infection unless clinical picture changes and will not add abx at this time  - CXR unchanged  - continues to tolerate HD w/o albumin (cannot go to LTAC if req. albumin)  - intermittent tachypnea likely a component of pain, Morphine 1mg IV PRN

## 2018-05-23 NOTE — PROGRESS NOTE ADULT - SUBJECTIVE AND OBJECTIVE BOX
Patient was seen and evaluated on dialysis.   Patient is tolerating the procedure so far   on vent, NAD  HR: 79 (05-23-18 @ 16:30)  BP: 90/58   Wt(kg): --  Continue dialysis:   Dialyzer:   F180      QB:  400      QD: 500  Goal UF 1-2L  over 3.5 Hours if tolerates Patient was seen and evaluated on dialysis.   Patient is tolerating the procedure so far   on vent, NAD  HR: 79 (05-23-18 @ 16:30)  BP: 90/58   Wt(kg): --  Continue dialysis:   Dialyzer:  revaclear 400      QB:  350      QD: 500  Goal UF 1-2L  over 3.5 Hours if tolerates

## 2018-05-23 NOTE — PROGRESS NOTE ADULT - PROBLEM SELECTOR PLAN 1
Acute kidney injury due to ischemic ATN requiring ongoing HD treatment via Right chest wall tunnel HD catheter.  Patient was last dialyzed 5/19 with UF of 2.3L UF  Volume status persistently hypervolemic.   Will do HD treatment today for UF and clearances.   Low K/Low phos/renal diet Acute kidney injury due to ischemic ATN requiring ongoing HD treatment via Right chest wall tunnel HD catheter.  Patient was last dialyzed 5/22 with UF of 2.5L UF  Volume status persistently hypervolemic with K level 5.1  Will do HD treatment today for UF and clearances.   Low K/Low phos/renal diet

## 2018-05-23 NOTE — PROGRESS NOTE ADULT - SUBJECTIVE AND OBJECTIVE BOX
Patient was seen and evaluated on dialysis.   Patient is tolerating the procedure well.   HR: 79 (05-23-18 @ 16:30)  BP: 80/58 (05-23-18 @ 16:30)  Continue dialysis:   Dialyzer: Revaclear 400         QB: 400       QD: 500 2K+  Goal UF 2 to 2.5 kg over 3.5 Hours

## 2018-05-23 NOTE — PROGRESS NOTE ADULT - PROBLEM SELECTOR PLAN 2
Patient's hgb 9.5 at present  No iron deficiency   EPO during next HD treatment.   Transfuse PRBC per primary team as indicated.

## 2018-05-24 DIAGNOSIS — J96.10 CHRONIC RESPIRATORY FAILURE, UNSPECIFIED WHETHER WITH HYPOXIA OR HYPERCAPNIA: ICD-10-CM

## 2018-05-24 LAB
ANION GAP SERPL CALC-SCNC: 12 MMOL/L — SIGNIFICANT CHANGE UP (ref 5–17)
BUN SERPL-MCNC: 33 MG/DL — HIGH (ref 7–23)
CALCIUM SERPL-MCNC: 8.4 MG/DL — SIGNIFICANT CHANGE UP (ref 8.4–10.5)
CHLORIDE SERPL-SCNC: 93 MMOL/L — LOW (ref 96–108)
CO2 SERPL-SCNC: 31 MMOL/L — SIGNIFICANT CHANGE UP (ref 22–31)
CREAT SERPL-MCNC: 2.41 MG/DL — HIGH (ref 0.5–1.3)
GLUCOSE BLDC GLUCOMTR-MCNC: 139 MG/DL — HIGH (ref 70–99)
GLUCOSE BLDC GLUCOMTR-MCNC: 154 MG/DL — HIGH (ref 70–99)
GLUCOSE BLDC GLUCOMTR-MCNC: 157 MG/DL — HIGH (ref 70–99)
GLUCOSE BLDC GLUCOMTR-MCNC: 194 MG/DL — HIGH (ref 70–99)
GLUCOSE SERPL-MCNC: 141 MG/DL — HIGH (ref 70–99)
HCT VFR BLD CALC: 28.8 % — LOW (ref 39–50)
HGB BLD-MCNC: 8.2 G/DL — LOW (ref 13–17)
INR BLD: 2.37 — HIGH (ref 0.88–1.16)
MAGNESIUM SERPL-MCNC: 2 MG/DL — SIGNIFICANT CHANGE UP (ref 1.6–2.6)
MCHC RBC-ENTMCNC: 26.9 PG — LOW (ref 27–34)
MCHC RBC-ENTMCNC: 28.5 G/DL — LOW (ref 32–36)
MCV RBC AUTO: 94.4 FL — SIGNIFICANT CHANGE UP (ref 80–100)
PHOSPHATE SERPL-MCNC: 2.7 MG/DL — SIGNIFICANT CHANGE UP (ref 2.5–4.5)
PLATELET # BLD AUTO: 139 K/UL — LOW (ref 150–400)
POTASSIUM SERPL-MCNC: 4.4 MMOL/L — SIGNIFICANT CHANGE UP (ref 3.5–5.3)
POTASSIUM SERPL-SCNC: 4.4 MMOL/L — SIGNIFICANT CHANGE UP (ref 3.5–5.3)
PROTHROM AB SERPL-ACNC: 26.8 SEC — HIGH (ref 9.8–12.7)
RBC # BLD: 3.05 M/UL — LOW (ref 4.2–5.8)
RBC # FLD: 17 % — HIGH (ref 10.3–16.9)
SODIUM SERPL-SCNC: 136 MMOL/L — SIGNIFICANT CHANGE UP (ref 135–145)
WBC # BLD: 18.5 K/UL — HIGH (ref 3.8–10.5)
WBC # FLD AUTO: 18.5 K/UL — HIGH (ref 3.8–10.5)

## 2018-05-24 PROCEDURE — 99232 SBSQ HOSP IP/OBS MODERATE 35: CPT | Mod: GC

## 2018-05-24 PROCEDURE — 71045 X-RAY EXAM CHEST 1 VIEW: CPT | Mod: 26

## 2018-05-24 PROCEDURE — 99232 SBSQ HOSP IP/OBS MODERATE 35: CPT

## 2018-05-24 PROCEDURE — 99233 SBSQ HOSP IP/OBS HIGH 50: CPT | Mod: GC

## 2018-05-24 RX ORDER — PIPERACILLIN AND TAZOBACTAM 4; .5 G/20ML; G/20ML
2.25 INJECTION, POWDER, LYOPHILIZED, FOR SOLUTION INTRAVENOUS EVERY 8 HOURS
Qty: 0 | Refills: 0 | Status: DISCONTINUED | OUTPATIENT
Start: 2018-05-24 | End: 2018-05-31

## 2018-05-24 RX ORDER — PIPERACILLIN AND TAZOBACTAM 4; .5 G/20ML; G/20ML
INJECTION, POWDER, LYOPHILIZED, FOR SOLUTION INTRAVENOUS
Qty: 0 | Refills: 0 | Status: DISCONTINUED | OUTPATIENT
Start: 2018-05-24 | End: 2018-05-24

## 2018-05-24 RX ORDER — HYDROMORPHONE HYDROCHLORIDE 2 MG/ML
0.25 INJECTION INTRAMUSCULAR; INTRAVENOUS; SUBCUTANEOUS ONCE
Qty: 0 | Refills: 0 | Status: DISCONTINUED | OUTPATIENT
Start: 2018-05-24 | End: 2018-05-24

## 2018-05-24 RX ORDER — WARFARIN SODIUM 2.5 MG/1
2.5 TABLET ORAL ONCE
Qty: 0 | Refills: 0 | Status: COMPLETED | OUTPATIENT
Start: 2018-05-24 | End: 2018-05-24

## 2018-05-24 RX ORDER — ACETAMINOPHEN 500 MG
1000 TABLET ORAL ONCE
Qty: 0 | Refills: 0 | Status: COMPLETED | OUTPATIENT
Start: 2018-05-24 | End: 2018-05-24

## 2018-05-24 RX ORDER — PIPERACILLIN AND TAZOBACTAM 4; .5 G/20ML; G/20ML
4.5 INJECTION, POWDER, LYOPHILIZED, FOR SOLUTION INTRAVENOUS EVERY 6 HOURS
Qty: 0 | Refills: 0 | Status: DISCONTINUED | OUTPATIENT
Start: 2018-05-24 | End: 2018-05-24

## 2018-05-24 RX ORDER — PIPERACILLIN AND TAZOBACTAM 4; .5 G/20ML; G/20ML
2.25 INJECTION, POWDER, LYOPHILIZED, FOR SOLUTION INTRAVENOUS EVERY 8 HOURS
Qty: 0 | Refills: 0 | Status: DISCONTINUED | OUTPATIENT
Start: 2018-05-24 | End: 2018-05-24

## 2018-05-24 RX ORDER — IBUPROFEN 200 MG
400 TABLET ORAL ONCE
Qty: 0 | Refills: 0 | Status: COMPLETED | OUTPATIENT
Start: 2018-05-24 | End: 2018-05-24

## 2018-05-24 RX ORDER — LEVETIRACETAM 250 MG/1
250 TABLET, FILM COATED ORAL ONCE
Qty: 0 | Refills: 0 | Status: DISCONTINUED | OUTPATIENT
Start: 2018-05-24 | End: 2018-05-24

## 2018-05-24 RX ORDER — DOXERCALCIFEROL 2.5 UG/1
2 CAPSULE ORAL ONCE
Qty: 0 | Refills: 0 | Status: COMPLETED | OUTPATIENT
Start: 2018-05-24 | End: 2018-05-24

## 2018-05-24 RX ORDER — PIPERACILLIN AND TAZOBACTAM 4; .5 G/20ML; G/20ML
3.38 INJECTION, POWDER, LYOPHILIZED, FOR SOLUTION INTRAVENOUS EVERY 6 HOURS
Qty: 0 | Refills: 0 | Status: DISCONTINUED | OUTPATIENT
Start: 2018-05-24 | End: 2018-05-24

## 2018-05-24 RX ORDER — ERYTHROPOIETIN 10000 [IU]/ML
10000 INJECTION, SOLUTION INTRAVENOUS; SUBCUTANEOUS ONCE
Qty: 0 | Refills: 0 | Status: COMPLETED | OUTPATIENT
Start: 2018-05-24 | End: 2018-05-24

## 2018-05-24 RX ORDER — MIDODRINE HYDROCHLORIDE 2.5 MG/1
10 TABLET ORAL ONCE
Qty: 0 | Refills: 0 | Status: COMPLETED | OUTPATIENT
Start: 2018-05-24 | End: 2018-05-24

## 2018-05-24 RX ORDER — PIPERACILLIN AND TAZOBACTAM 4; .5 G/20ML; G/20ML
2.25 INJECTION, POWDER, LYOPHILIZED, FOR SOLUTION INTRAVENOUS ONCE
Qty: 0 | Refills: 0 | Status: DISCONTINUED | OUTPATIENT
Start: 2018-05-24 | End: 2018-05-24

## 2018-05-24 RX ADMIN — MIDODRINE HYDROCHLORIDE 15 MILLIGRAM(S): 2.5 TABLET ORAL at 04:50

## 2018-05-24 RX ADMIN — MIDODRINE HYDROCHLORIDE 15 MILLIGRAM(S): 2.5 TABLET ORAL at 19:10

## 2018-05-24 RX ADMIN — Medication 1 APPLICATION(S): at 12:18

## 2018-05-24 RX ADMIN — Medication 5 MILLILITER(S): at 19:15

## 2018-05-24 RX ADMIN — INSULIN HUMAN 8 UNIT(S): 100 INJECTION, SOLUTION SUBCUTANEOUS at 18:59

## 2018-05-24 RX ADMIN — MIDODRINE HYDROCHLORIDE 15 MILLIGRAM(S): 2.5 TABLET ORAL at 12:14

## 2018-05-24 RX ADMIN — ATORVASTATIN CALCIUM 80 MILLIGRAM(S): 80 TABLET, FILM COATED ORAL at 22:42

## 2018-05-24 RX ADMIN — HYDROMORPHONE HYDROCHLORIDE 0.25 MILLIGRAM(S): 2 INJECTION INTRAMUSCULAR; INTRAVENOUS; SUBCUTANEOUS at 17:35

## 2018-05-24 RX ADMIN — Medication 400 MILLIGRAM(S): at 23:49

## 2018-05-24 RX ADMIN — INSULIN HUMAN 8 UNIT(S): 100 INJECTION, SOLUTION SUBCUTANEOUS at 07:00

## 2018-05-24 RX ADMIN — Medication 15 MILLIGRAM(S): at 10:06

## 2018-05-24 RX ADMIN — LEVETIRACETAM 500 MILLIGRAM(S): 250 TABLET, FILM COATED ORAL at 15:03

## 2018-05-24 RX ADMIN — Medication 15 MILLIGRAM(S): at 22:42

## 2018-05-24 RX ADMIN — PANTOPRAZOLE SODIUM 40 MILLIGRAM(S): 20 TABLET, DELAYED RELEASE ORAL at 12:13

## 2018-05-24 RX ADMIN — Medication 5 MILLIGRAM(S): at 08:26

## 2018-05-24 RX ADMIN — HYDROMORPHONE HYDROCHLORIDE 0.25 MILLIGRAM(S): 2 INJECTION INTRAMUSCULAR; INTRAVENOUS; SUBCUTANEOUS at 17:05

## 2018-05-24 RX ADMIN — PIPERACILLIN AND TAZOBACTAM 200 GRAM(S): 4; .5 INJECTION, POWDER, LYOPHILIZED, FOR SOLUTION INTRAVENOUS at 17:04

## 2018-05-24 RX ADMIN — INSULIN HUMAN 2: 100 INJECTION, SOLUTION SUBCUTANEOUS at 12:56

## 2018-05-24 RX ADMIN — ESCITALOPRAM OXALATE 5 MILLIGRAM(S): 10 TABLET, FILM COATED ORAL at 12:15

## 2018-05-24 RX ADMIN — Medication 5 MILLIGRAM(S): at 15:03

## 2018-05-24 RX ADMIN — INSULIN HUMAN 8 UNIT(S): 100 INJECTION, SOLUTION SUBCUTANEOUS at 00:22

## 2018-05-24 RX ADMIN — CALCITRIOL 0.25 MICROGRAM(S): 0.5 CAPSULE ORAL at 12:16

## 2018-05-24 RX ADMIN — Medication 650 MILLIGRAM(S): at 02:31

## 2018-05-24 RX ADMIN — Medication 650 MILLIGRAM(S): at 03:31

## 2018-05-24 RX ADMIN — Medication 5 MILLILITER(S): at 05:13

## 2018-05-24 RX ADMIN — DOXERCALCIFEROL 2 MICROGRAM(S): 2.5 CAPSULE ORAL at 14:14

## 2018-05-24 RX ADMIN — INSULIN HUMAN 8 UNIT(S): 100 INJECTION, SOLUTION SUBCUTANEOUS at 12:57

## 2018-05-24 RX ADMIN — Medication 1 MILLIGRAM(S): at 12:16

## 2018-05-24 RX ADMIN — Medication 400 MILLIGRAM(S): at 20:04

## 2018-05-24 RX ADMIN — Medication 1 APPLICATION(S): at 07:00

## 2018-05-24 RX ADMIN — Medication 5 MILLIGRAM(S): at 22:42

## 2018-05-24 RX ADMIN — MODAFINIL 100 MILLIGRAM(S): 200 TABLET ORAL at 12:19

## 2018-05-24 RX ADMIN — ERYTHROPOIETIN 10000 UNIT(S): 10000 INJECTION, SOLUTION INTRAVENOUS; SUBCUTANEOUS at 14:15

## 2018-05-24 RX ADMIN — WARFARIN SODIUM 2.5 MILLIGRAM(S): 2.5 TABLET ORAL at 22:42

## 2018-05-24 RX ADMIN — CHLORHEXIDINE GLUCONATE 15 MILLILITER(S): 213 SOLUTION TOPICAL at 22:42

## 2018-05-24 RX ADMIN — Medication 1 TABLET(S): at 12:16

## 2018-05-24 RX ADMIN — Medication 81 MILLIGRAM(S): at 12:16

## 2018-05-24 NOTE — PROGRESS NOTE ADULT - SUBJECTIVE AND OBJECTIVE BOX
INTERVAL HPI/OVERNIGHT EVENTS: 100.9 fever, otherwise stable CXR unchanged, no excessive secretion, bcx taken     SUBJECTIVE: Patient seen and examined at bedside. ros unable to assess     OBJECTIVE:    VITAL SIGNS:  ICU Vital Signs Last 24 Hrs  T(C): 36.6 (24 May 2018 06:26), Max: 38.3 (24 May 2018 02:12)  T(F): 97.8 (24 May 2018 06:26), Max: 100.9 (24 May 2018 02:12)  HR: 85 (24 May 2018 06:26) (69 - 91)  BP: 88/66 (24 May 2018 06:26) (76/51 - 104/53)  BP(mean): --  ABP: --  ABP(mean): --  RR: 16 (24 May 2018 06:26) (10 - 22)  SpO2: 100% (24 May 2018 06:26) (98% - 100%)    Mode: AC/ CMV (Assist Control/ Continuous Mandatory Ventilation), RR (machine): 10, TV (machine): 500, FiO2: 40, PEEP: 5, ITime: 0.1, MAP: 15, PIP: 25    05-23 @ 07:01  -  05-24 @ 07:00  --------------------------------------------------------  IN: 800 mL / OUT: 3800 mL / NET: -3000 mL      CAPILLARY BLOOD GLUCOSE      POCT Blood Glucose.: 157 mg/dL (24 May 2018 06:37)      PHYSICAL EXAM:    General:on ACVC, tachypneic, comfortable, NAD, sleeping   HEENT: NC/AT; PERRL, anicteric sclera; MMM  Neck: +trach; no secretions  Cardiovascular: +S1/S2; regular and no tachycardia  Respiratory: diminished breath sounds b/l; diffuse b/l crackles; no rhonchi   Gastrointestinal: distended and firm but non-tender and bowel sounds present; PEG in place, no drainage around site, feeds running; no TTP, BSx4  Extremities: WWP; 2+ pitting edema b/l; chronic venous stasis changes bilateral LE, bandage round L ankle and leg   Vascular: palp peripheral pulses b/l  Neurological:  Alert and awake. Appears to respond to name, waxing and waning, occasionally follows commands "squeeze hand'   Skin: Chronic venous stasis changes b/l LE with dressings applied by wound care    MEDICATIONS:  MEDICATIONS  (STANDING):  acetylcysteine 20% Inhalation 5 milliLiter(s) Inhalation two times a day  aspirin  chewable 81 milliGRAM(s) Oral daily  atorvastatin 80 milliGRAM(s) Oral at bedtime  calcitriol  Solution 0.25 MICROGram(s) Oral daily  chlorhexidine 0.12% Liquid 15 milliLiter(s) Swish and Spit two times a day  collagenase Ointment 1 Application(s) Topical daily  dextrose 5%. 1000 milliLiter(s) (50 mL/Hr) IV Continuous <Continuous>  dextrose 50% Injectable 12.5 Gram(s) IV Push once  dextrose 50% Injectable 25 Gram(s) IV Push once  dextrose 50% Injectable 25 Gram(s) IV Push once  escitalopram 5 milliGRAM(s) Oral daily  folic acid 1 milliGRAM(s) Oral daily  hydrocortisone 15 milliGRAM(s) Oral every 12 hours  insulin regular  human corrective regimen sliding scale   SubCutaneous every 6 hours  insulin regular  human recombinant 8 Unit(s) SubCutaneous every 6 hours  levETIRAcetam  Solution 500 milliGRAM(s) Oral every 24 hours  metoclopramide 5 milliGRAM(s) Oral every 8 hours  midodrine 15 milliGRAM(s) Oral every 8 hours  modafinil 100 milliGRAM(s) Oral daily  multivitamin 1 Tablet(s) Oral daily  pantoprazole   Suspension 40 milliGRAM(s) Oral daily  silver sulfADIAZINE 1% Cream 1 Application(s) Topical daily    MEDICATIONS  (PRN):  acetaminophen    Suspension. 650 milliGRAM(s) Oral every 6 hours PRN Moderate Pain (4 - 6)  dextrose Gel 1 Dose(s) Oral once PRN Blood Glucose LESS THAN 70 milliGRAM(s)/deciliter  glucagon  Injectable 1 milliGRAM(s) IntraMuscular once PRN Glucose LESS THAN 70 milligrams/deciliter      ALLERGIES:  Allergies    No Known Allergies    Intolerances        LABS:                        9.5    13.6  )-----------( 136      ( 23 May 2018 06:47 )             32.4     05-23    134<L>  |  93<L>  |  40<H>  ----------------------------<  225<H>  5.1   |  26  |  2.80<H>    Ca    8.7      23 May 2018 06:49  Phos  3.4     05-23  Mg     2.1     05-23    TPro  8.1  /  Alb  2.6<L>  /  TBili  0.7  /  DBili  x   /  AST  76<H>  /  ALT  93<H>  /  AlkPhos  388<H>  05-23    PT/INR - ( 23 May 2018 06:47 )   PT: 23.7 sec;   INR: 2.10

## 2018-05-24 NOTE — PROGRESS NOTE ADULT - PROBLEM SELECTOR PLAN 1
Acute respiratory failure in setting of septic and cardiogenic shock, with trach placed on 4/5/18 for persistent, now chronic respiratory failure. CXR showing effusions, tolerated HD 5/19 2.3L removed w/o albumin. Now back on ACVC, though per 7L team not concerned for new process.   - wean to CPAP/trach collar as tolerated  - f/u sputum culture, growing GNR, though pt not meeting sepsis criteria so unlikely to have active infection unless clinical picture changes and will not add abx at this time  - CXR unchanged  - continues to tolerate HD w/o albumin (cannot go to LTAC if req. albumin)  - intermittent tachypnea likely a component of pain, dilaudid 0.25mg IV PRN

## 2018-05-24 NOTE — PROGRESS NOTE ADULT - PROBLEM SELECTOR PLAN 1
Acute kidney injury due to ischemic ATN requiring ongoing HD treatment via Right chest wall tunnel HD catheter.  Patient was last dialyzed 5/23 with UF of 3 L UF  Volume status persistently hypervolemic  Will do HD treatment today for UF and clearances for short duration as tolerated.  Low K/Low phos/renal diet

## 2018-05-24 NOTE — PROGRESS NOTE ADULT - SUBJECTIVE AND OBJECTIVE BOX
Patient was seen and evaluated on dialysis.   Patient is tolerating the procedure well.   HR: 81 (05-24-18 @ 11:40)  BP: 89/65 (05-24-18 @ 11:40)  Continue dialysis:   Dialyzer: Revaclear 400          QB: 400        QD: 500 3K+  Goal UF 2 to 2.5 Kg over 3 Hours

## 2018-05-24 NOTE — PROGRESS NOTE ADULT - ATTENDING COMMENTS
Tracheobronchitis: Fever 100.9, slightly increased secretions, periods of increased RR and increased WBC count. Start Zosyn for pseudomonas in previous sputum cx.

## 2018-05-24 NOTE — PROGRESS NOTE ADULT - SUBJECTIVE AND OBJECTIVE BOX
Interval Events:  Patient seen and examined at bedside. ELSIE overnight.  This AM w more thin secretions, one episode of low grade temp.    MEDICATIONS:  Pulmonary:  acetylcysteine 20% Inhalation 5 milliLiter(s) Inhalation two times a day    Antimicrobials:  piperacillin/tazobactam IVPB. 2.25 Gram(s) IV Intermittent every 8 hours    Anticoagulants:  aspirin  chewable 81 milliGRAM(s) Oral daily  warfarin 2.5 milliGRAM(s) Oral once    Cardiac:  midodrine 15 milliGRAM(s) Oral every 8 hours      Allergies    No Known Allergies    Intolerances        Vital Signs Last 24 Hrs  T(C): 37.1 (24 May 2018 14:40), Max: 38.3 (24 May 2018 02:12)  T(F): 98.7 (24 May 2018 14:40), Max: 100.9 (24 May 2018 02:12)  HR: 70 (24 May 2018 14:40) (69 - 90)  BP: 105/73 (24 May 2018 14:40) (76/51 - 105/73)  BP(mean): --  RR: 16 (24 May 2018 14:40) (10 - 22)  SpO2: 100% (24 May 2018 15:45) (98% - 100%)    05-23 @ 07:01 - 05-24 @ 07:00  --------------------------------------------------------  IN: 1710 mL / OUT: 3800 mL / NET: -2090 mL    05-24 @ 07:01 - 05-24 @ 16:57  --------------------------------------------------------  IN: 600 mL / OUT: 3100 mL / NET: -2500 mL      Mode: AC/ CMV (Assist Control/ Continuous Mandatory Ventilation)  RR (machine): 10  TV (machine): 500  FiO2: 40  PEEP: 5  ITime: 0.85  MAP: 9  PIP: 23      PHYSICAL EXAM:  General: Well nourished; intermittently overbreathing the vent  HEENT: PERRL, non icteric  Neck: Supple; no masses  Respiratory: b/l rhonchi  Cardiovascular: Regular rate and rhythm. S1 and S2 Normal;  Gastrointestinal: Soft non-tender non-distended;  Extremities:WWP, no edema, no cyanosis  Neurological: Alert and oriented x3  Skin: Warm and dry. No obvious rash    LABS:  ABG - ( 22 May 2018 18:54 )  pH, Arterial: 7.54  pH, Blood: x     /  pCO2: 32    /  pO2: 108   / HCO3: 27    / Base Excess: 4.4   /  SaO2: 98                  CBC Full  -  ( 24 May 2018 12:17 )  WBC Count : 18.5 K/uL  Hemoglobin : 8.2 g/dL  Hematocrit : 28.8 %  Platelet Count - Automated : 139 K/uL  Mean Cell Volume : 94.4 fL  Mean Cell Hemoglobin : 26.9 pg  Mean Cell Hemoglobin Concentration : 28.5 g/dL  Auto Neutrophil # : x  Auto Lymphocyte # : x  Auto Monocyte # : x  Auto Eosinophil # : x  Auto Basophil # : x  Auto Neutrophil % : x  Auto Lymphocyte % : x  Auto Monocyte % : x  Auto Eosinophil % : x  Auto Basophil % : x    05-24    136  |  93<L>  |  33<H>  ----------------------------<  141<H>  4.4   |  31  |  2.41<H>    Ca    8.4      24 May 2018 12:17  Phos  2.7     05-24  Mg     2.0     05-24    TPro  8.1  /  Alb  2.6<L>  /  TBili  0.7  /  DBili  x   /  AST  76<H>  /  ALT  93<H>  /  AlkPhos  388<H>  05-23    PT/INR - ( 24 May 2018 12:17 )   PT: 26.8 sec;   INR: 2.37                    h        RADIOLOGY imaging reviewed

## 2018-05-24 NOTE — PROGRESS NOTE ADULT - SUBJECTIVE AND OBJECTIVE BOX
Chart note-    Note reviewed. Pt has been tolerating D without albumin to support his BP.  Pt remains on carlin ventilator but has made progress in terms of weaning.    STEPHEN/CM working closely with pts insurance to identify a disposition that can meet pts medical needs.    Palliative medicine will sign off.   Discussed with HS.

## 2018-05-24 NOTE — PROGRESS NOTE ADULT - ASSESSMENT
64 yo M with ischemic cardiomyopathy with prolonged hospital course 2/2 septic shock likely from cellulitis complicated by ATN and renal failure requiring dialysis, respiratory failure requiring intubation and ultimately trach/peg, HCAP s/p abx therapy, and AMS 2/2 brainstem infarct thought to be from LV thrombus develops tachypnea and significant respiratory alkalosis

## 2018-05-24 NOTE — PROGRESS NOTE ADULT - SUBJECTIVE AND OBJECTIVE BOX
Patient is a 63y Male seen and evaluated at bedside. Patient lying in bed in no acute distress unable to follow verbal commands and remains on ventilatory support. Last HD on 5/23 with 3L UF. Tolerated procedure well.       acetaminophen    Suspension. 650 every 6 hours PRN  acetylcysteine 20% Inhalation 5 two times a day  aspirin  chewable 81 daily  atorvastatin 80 at bedtime  calcitriol  Solution 0.25 daily  chlorhexidine 0.12% Liquid 15 two times a day  collagenase Ointment 1 daily  dextrose 5%. 1000 <Continuous>  dextrose 50% Injectable 12.5 once  dextrose 50% Injectable 25 once  dextrose 50% Injectable 25 once  dextrose Gel 1 once PRN  escitalopram 5 daily  folic acid 1 daily  glucagon  Injectable 1 once PRN  hydrocortisone 15 every 12 hours  insulin regular  human corrective regimen sliding scale  every 6 hours  insulin regular  human recombinant 8 every 6 hours  levETIRAcetam  Solution 500 every 24 hours  metoclopramide 5 every 8 hours  midodrine 15 every 8 hours  modafinil 100 daily  multivitamin 1 daily  pantoprazole   Suspension 40 daily  silver sulfADIAZINE 1% Cream 1 daily      Allergies    No Known Allergies    Intolerances        T(C): , Max: 38.3 (05-24-18 @ 02:12)  T(F): , Max: 100.9 (05-24-18 @ 02:12)  HR: 83 (05-24-18 @ 09:06)  BP: 89/55 (05-24-18 @ 09:06)  BP(mean): --  RR: 16 (05-24-18 @ 09:06)  SpO2: 100% (05-24-18 @ 09:06)  Wt(kg): --    05-23 @ 07:01  -  05-24 @ 07:00  --------------------------------------------------------  IN: 1710 mL / OUT: 3800 mL / NET: -2090 mL    Review of Systems:  Limited. Patient off ventilatory support ventilatory support    PHYSICAL EXAM:  GENERAL: Ill appearing, awake but confused, disoriented in no acute distress at present  HEAD:  Atraumatic, Normocephalic,   EYES: Bilateral conjuctival and scleral pallor+nt  Oral cavity: Oral mucosa moist and pale  NECK: Neck supple, Mild JVP, tracheostomy present.  CHEST/LUNG:  Bilateral decreased breath sounds, Bibasilar rales and crepitatiosn+nt, Right>left, no wheezing  HEART: Regular rate and rhythm. HOMER II/VI at LPSB, No gallop, no rub   ABDOMEN: Soft, Nontender, nondistended, PEG tube present. BS+nt, No flank tenderness.   EXTREMITIES: Minimal dependent thigh edema, No clubbing, cyanosis  Neurology: AAOx1-2, awake but confused, disoriented  SKIN: No rashes or lesions    ACCESS: Right chest wall tunnel HD catheter with no bleeding.    LABS:                        9.5    13.6  )-----------( 136      ( 23 May 2018 06:47 )             32.4     05-23    134<L>  |  93<L>  |  40<H>  ----------------------------<  225<H>  5.1   |  26  |  2.80<H>    Ca    8.7      23 May 2018 06:49  Phos  3.4     05-23  Mg     2.1     05-23    TPro  8.1  /  Alb  2.6<L>  /  TBili  0.7  /  DBili  x   /  AST  76<H>  /  ALT  93<H>  /  AlkPhos  388<H>  05-23      PT/INR - ( 23 May 2018 06:47 )   PT: 23.7 sec;   INR: 2.10                    RADIOLOGY & ADDITIONAL STUDIES:

## 2018-05-24 NOTE — CHART NOTE - NSCHARTNOTEFT_GEN_A_CORE
Admitting Diagnosis:   63M PMH HFrEF 10-15% (ischemic), MI, s/p AICD vs PPM, possible Afib, HTN, DM2 on insulin, possible CKD, and gout, who presents with a chief complaint of generalized weakness s/p septic shock likely 2/2 LE cellulitis. AMS and new leukocytisis likely 2/2 UTI. Trach and PEG dependent. Continues to receive HD TIW.    PAST MEDICAL & SURGICAL HISTORY:  Type 2 diabetes mellitus with diabetic peripheral angiopathy without gangrene, with long-term curren  Essential hypertension, benign  Gout  Pacemaker  Chronic systolic heart failure  Myocardial infarction  No significant past surgical history      Current Nutrition Order:  Jevity 1.5 Terrell @ 50mL/hr x 24hrs plus ProStat Sugar Free BID (200 kcal, 30g protein) via PEG providing in total: 1200mL TV, 2000 kcal, 107g protein, 912 mL free H2O, 120% RDI    PO Intake: Good (%) [   ]  Fair (50-75%) [   ] Poor (<25%) [   ]- N/A NPO with TF    GI Issues: Good tolerance per RN    Pain: Unable to assess at this time 2/2 vent     Skin Integrity:  L buttock stage 2 PU  L calf venous ulcer  Trach stage 3 PU  L. UE eschar     Labs:       136  |  93<L>  |  33<H>  ----------------------------<  141<H>  4.4   |  31  |  2.41<H>    Ca    8.4      24 May 2018 12:17  Phos  2.7       Mg     2.0         TPro  8.1  /  Alb  2.6<L>  /  TBili  0.7  /  DBili  x   /  AST  76<H>  /  ALT  93<H>  /  AlkPhos  388<H>      CAPILLARY BLOOD GLUCOSE      POCT Blood Glucose.: 154 mg/dL (24 May 2018 12:00)  POCT Blood Glucose.: 157 mg/dL (24 May 2018 06:37)  POCT Blood Glucose.: 123 mg/dL (23 May 2018 23:42)      Medications:  MEDICATIONS  (STANDING):  acetylcysteine 20% Inhalation 5 milliLiter(s) Inhalation two times a day  aspirin  chewable 81 milliGRAM(s) Oral daily  atorvastatin 80 milliGRAM(s) Oral at bedtime  calcitriol  Solution 0.25 MICROGram(s) Oral daily  chlorhexidine 0.12% Liquid 15 milliLiter(s) Swish and Spit two times a day  collagenase Ointment 1 Application(s) Topical daily  dextrose 5%. 1000 milliLiter(s) (50 mL/Hr) IV Continuous <Continuous>  dextrose 50% Injectable 12.5 Gram(s) IV Push once  dextrose 50% Injectable 25 Gram(s) IV Push once  dextrose 50% Injectable 25 Gram(s) IV Push once  escitalopram 5 milliGRAM(s) Oral daily  folic acid 1 milliGRAM(s) Oral daily  hydrocortisone 15 milliGRAM(s) Oral every 12 hours  insulin regular  human corrective regimen sliding scale   SubCutaneous every 6 hours  insulin regular  human recombinant 8 Unit(s) SubCutaneous every 6 hours  levETIRAcetam  Solution 500 milliGRAM(s) Oral every 24 hours  metoclopramide 5 milliGRAM(s) Oral every 8 hours  midodrine 15 milliGRAM(s) Oral every 8 hours  modafinil 100 milliGRAM(s) Oral daily  multivitamin 1 Tablet(s) Oral daily  pantoprazole   Suspension 40 milliGRAM(s) Oral daily  piperacillin/tazobactam IVPB. 2.25 Gram(s) IV Intermittent every 8 hours  silver sulfADIAZINE 1% Cream 1 Application(s) Topical daily  warfarin 2.5 milliGRAM(s) Oral once    MEDICATIONS  (PRN):  acetaminophen    Suspension. 650 milliGRAM(s) Oral every 6 hours PRN Moderate Pain (4 - 6)  dextrose Gel 1 Dose(s) Oral once PRN Blood Glucose LESS THAN 70 milliGRAM(s)/deciliter  glucagon  Injectable 1 milliGRAM(s) IntraMuscular once PRN Glucose LESS THAN 70 milligrams/deciliter      Weight:  Daily     Daily Weight in k.6 (24 May 2018 14:40)    Weight:  80.3kg ()  85.6kg ()  86.8kg ()  86.2kg (5/3)  85.1kg ()  79.1kg ()  75.9kg ()  77.4kg ()  72.7kg ()  77.2kg ()  80.9 kg ()  84.5kg (3/31)  86.9kg (3/19)  94.7 kg (3/16)    Weight Change:   Weight fluctuating throughout admission d/t HD, TF inconsistencies    Estimated energy needs using 89 kg IBW; Needs estimated / vent/post-op/PU/HD  Calories: 25-30 kcal/kg = 2865-4436 kcal/day  Protein: 1.4-1.6 g/kg = 125-142 g protein/day  Fluids: per team  HD     Subjective:   S/p trach and PEG placements on . S/p permacath replacement on . Stable on RMF at this time. Pt seen in room, asleep, undergoing HD. Trached to vent on VC/AC mode; tolerating CPAP trials, but became tachypnic on trach collar. TF running at current goal rate of 50mL/hr with good tolerance, though per RD recs, should be at 62mL/hr. Currently undergoing HD. Na 134 (L), K 5.1, K/Phos WNL, BUN 40, Cr 2.8, LFTs elevated. Pending ANNI placement still.     Previous Nutrition Diagnosis:   Increased protein-calorie needs RT increased demand for protein-calorie intake AEB on vent support    Active [X]  Resolved [   ]    New PES statement:     Goal:   Continue to meet % of nutrition needs via tolerated route.     Recommendations:  1. Recommend changing TF order to meet needs: Jevity 1.5 Terrell @ 62mL/hr x 24hrs plus ProStat Sugar Free BID (200 kcal, 30g protein) to provide in total: 1488 mL TV, 2432 kcal, 125g protein, 1131 mL free H2O, 148% RDI, 1.40g/kg IBW protein. Monitor for s/s intolerance; maintain aspiration precautions at all times  *endorsed to team and ordered for verification ((noted in MD note but never order never verified!!!))  2. Continue to trend weights  3 Monitor POC BG and provide adequate insulin coverage  4. Continue to monitor for GOC and keep nutrition in line at all times     Education:   N/A-vent    Risk Level: High [  ] Moderate [ X  ] Low [   ]

## 2018-05-24 NOTE — PROGRESS NOTE ADULT - PROBLEM SELECTOR PLAN 1
Vent on AC/VC.  Pt w intermittent short events of tachypnea, auto PEEP and high peak pressures, resolves spontaneously w peak pressures back to low 20s - bothered by secretions? pain? delirium?  CXR this AM w better aeration of L lung compared w one from 5/22.  Thin secretions and low grade fever, non toxic appearing - will hold off on ABx.  Would advise against Ativan for agitation as might be deliriugenic. May try pain control or small dose of Haldol instead if you think specific events are attributed to those.

## 2018-05-24 NOTE — PROGRESS NOTE ADULT - PROBLEM SELECTOR PLAN 8
RESOLVED (had 100.9 but without other VS changes, will allow patient to present himself)   Presented initially with septic shock, has completed course of abx for bacteremia with Klebsiella as well as fungemia. Further complicated by septic shock 2/2 to aspiration with increasing O2 requirements while on 7Lach and transferred back to MICU. Weaned off pressors and back on 7La for which patient completed additional 10 day course of Zosyn.   - continue to monitor for signs and symptoms of infection  - monitor WBC    #Fungemia  RESOLVED. Noted to have Candida Tropicalis growing in blood cultures and completed fluconazole course.

## 2018-05-24 NOTE — PROGRESS NOTE ADULT - PROBLEM SELECTOR PLAN 2
Patient's hgb 9.5 at present  No iron deficiency   EPO during  HD treatment.   Transfuse PRBC per primary team as indicated.

## 2018-05-24 NOTE — PROGRESS NOTE ADULT - PROBLEM SELECTOR PLAN 10
VTE: Coumadin dosed nightly, with INR daily checks, goal 2-3  GI PPx: PPI  DNR- Made DNR, family wants escalation of care, pressors if needed.  F: No IVF in setting of HD  E: Replete electrolytes with caution in setting of HD  N: Jevity 1.5 goal rate 62cc/hr, per updated nutrition recs 5/21  Dispo: SD to [facility], likely chronic vent facility + HD, pending SW placement. Dispo plans and SD plans discussed with daughter Cristal over phone today.

## 2018-05-25 LAB
ANION GAP SERPL CALC-SCNC: 15 MMOL/L — SIGNIFICANT CHANGE UP (ref 5–17)
APTT BLD: 40.8 SEC — HIGH (ref 27.5–37.4)
BUN SERPL-MCNC: 27 MG/DL — HIGH (ref 7–23)
CALCIUM SERPL-MCNC: 8.5 MG/DL — SIGNIFICANT CHANGE UP (ref 8.4–10.5)
CHLORIDE SERPL-SCNC: 92 MMOL/L — LOW (ref 96–108)
CO2 SERPL-SCNC: 28 MMOL/L — SIGNIFICANT CHANGE UP (ref 22–31)
CREAT SERPL-MCNC: 2.18 MG/DL — HIGH (ref 0.5–1.3)
GLUCOSE BLDC GLUCOMTR-MCNC: 144 MG/DL — HIGH (ref 70–99)
GLUCOSE BLDC GLUCOMTR-MCNC: 158 MG/DL — HIGH (ref 70–99)
GLUCOSE BLDC GLUCOMTR-MCNC: 167 MG/DL — HIGH (ref 70–99)
GLUCOSE BLDC GLUCOMTR-MCNC: 231 MG/DL — HIGH (ref 70–99)
GLUCOSE BLDC GLUCOMTR-MCNC: 270 MG/DL — HIGH (ref 70–99)
GLUCOSE SERPL-MCNC: 271 MG/DL — HIGH (ref 70–99)
HCT VFR BLD CALC: 26.7 % — LOW (ref 39–50)
HGB BLD-MCNC: 8 G/DL — LOW (ref 13–17)
INR BLD: 2.43 — HIGH (ref 0.88–1.16)
LYMPHOCYTES # BLD AUTO: 4 % — LOW (ref 13–44)
MAGNESIUM SERPL-MCNC: 1.9 MG/DL — SIGNIFICANT CHANGE UP (ref 1.6–2.6)
MCHC RBC-ENTMCNC: 27 PG — SIGNIFICANT CHANGE UP (ref 27–34)
MCHC RBC-ENTMCNC: 30 G/DL — LOW (ref 32–36)
MCV RBC AUTO: 90.2 FL — SIGNIFICANT CHANGE UP (ref 80–100)
MONOCYTES NFR BLD AUTO: 6 % — SIGNIFICANT CHANGE UP (ref 2–14)
NEUTROPHILS NFR BLD AUTO: 79 % — HIGH (ref 43–77)
PHOSPHATE SERPL-MCNC: 2.3 MG/DL — LOW (ref 2.5–4.5)
PLATELET # BLD AUTO: 145 K/UL — LOW (ref 150–400)
POTASSIUM SERPL-MCNC: 3.9 MMOL/L — SIGNIFICANT CHANGE UP (ref 3.5–5.3)
POTASSIUM SERPL-SCNC: 3.9 MMOL/L — SIGNIFICANT CHANGE UP (ref 3.5–5.3)
PROTHROM AB SERPL-ACNC: 27.5 SEC — HIGH (ref 9.8–12.7)
RBC # BLD: 2.96 M/UL — LOW (ref 4.2–5.8)
RBC # FLD: 17.2 % — HIGH (ref 10.3–16.9)
SODIUM SERPL-SCNC: 135 MMOL/L — SIGNIFICANT CHANGE UP (ref 135–145)
VANCOMYCIN FLD-MCNC: 15.2 UG/ML — SIGNIFICANT CHANGE UP
WBC # BLD: 29.4 K/UL — HIGH (ref 3.8–10.5)
WBC # FLD AUTO: 29.4 K/UL — HIGH (ref 3.8–10.5)

## 2018-05-25 PROCEDURE — 99233 SBSQ HOSP IP/OBS HIGH 50: CPT | Mod: GC

## 2018-05-25 PROCEDURE — 99232 SBSQ HOSP IP/OBS MODERATE 35: CPT | Mod: GC

## 2018-05-25 RX ORDER — ACETAMINOPHEN 500 MG
650 TABLET ORAL EVERY 6 HOURS
Qty: 0 | Refills: 0 | Status: DISCONTINUED | OUTPATIENT
Start: 2018-05-25 | End: 2018-05-28

## 2018-05-25 RX ORDER — VANCOMYCIN HCL 1 G
1000 VIAL (EA) INTRAVENOUS ONCE
Qty: 0 | Refills: 0 | Status: COMPLETED | OUTPATIENT
Start: 2018-05-25 | End: 2018-05-25

## 2018-05-25 RX ORDER — METOCLOPRAMIDE HCL 10 MG
5 TABLET ORAL EVERY 12 HOURS
Qty: 0 | Refills: 0 | Status: DISCONTINUED | OUTPATIENT
Start: 2018-05-25 | End: 2018-07-01

## 2018-05-25 RX ORDER — WARFARIN SODIUM 2.5 MG/1
2 TABLET ORAL ONCE
Qty: 0 | Refills: 0 | Status: COMPLETED | OUTPATIENT
Start: 2018-05-25 | End: 2018-05-25

## 2018-05-25 RX ORDER — MIDODRINE HYDROCHLORIDE 2.5 MG/1
10 TABLET ORAL ONCE
Qty: 0 | Refills: 0 | Status: COMPLETED | OUTPATIENT
Start: 2018-05-25 | End: 2018-05-25

## 2018-05-25 RX ORDER — SODIUM HYPOCHLORITE 0.125 %
1 SOLUTION, NON-ORAL MISCELLANEOUS DAILY
Qty: 0 | Refills: 0 | Status: DISCONTINUED | OUTPATIENT
Start: 2018-05-25 | End: 2018-05-26

## 2018-05-25 RX ADMIN — Medication 15 MILLIGRAM(S): at 10:41

## 2018-05-25 RX ADMIN — Medication 1 MILLIGRAM(S): at 12:09

## 2018-05-25 RX ADMIN — INSULIN HUMAN 2: 100 INJECTION, SOLUTION SUBCUTANEOUS at 00:34

## 2018-05-25 RX ADMIN — INSULIN HUMAN 8 UNIT(S): 100 INJECTION, SOLUTION SUBCUTANEOUS at 01:05

## 2018-05-25 RX ADMIN — Medication 5 MILLIGRAM(S): at 06:41

## 2018-05-25 RX ADMIN — LEVETIRACETAM 500 MILLIGRAM(S): 250 TABLET, FILM COATED ORAL at 15:16

## 2018-05-25 RX ADMIN — Medication 1 APPLICATION(S): at 12:10

## 2018-05-25 RX ADMIN — Medication 81 MILLIGRAM(S): at 12:09

## 2018-05-25 RX ADMIN — INSULIN HUMAN 4: 100 INJECTION, SOLUTION SUBCUTANEOUS at 18:29

## 2018-05-25 RX ADMIN — PIPERACILLIN AND TAZOBACTAM 200 GRAM(S): 4; .5 INJECTION, POWDER, LYOPHILIZED, FOR SOLUTION INTRAVENOUS at 02:10

## 2018-05-25 RX ADMIN — Medication 5 MILLIGRAM(S): at 18:30

## 2018-05-25 RX ADMIN — Medication 5 MILLILITER(S): at 06:41

## 2018-05-25 RX ADMIN — ATORVASTATIN CALCIUM 80 MILLIGRAM(S): 80 TABLET, FILM COATED ORAL at 22:36

## 2018-05-25 RX ADMIN — Medication 1 APPLICATION(S): at 12:11

## 2018-05-25 RX ADMIN — INSULIN HUMAN 6: 100 INJECTION, SOLUTION SUBCUTANEOUS at 06:50

## 2018-05-25 RX ADMIN — INSULIN HUMAN 8 UNIT(S): 100 INJECTION, SOLUTION SUBCUTANEOUS at 12:37

## 2018-05-25 RX ADMIN — ESCITALOPRAM OXALATE 5 MILLIGRAM(S): 10 TABLET, FILM COATED ORAL at 12:09

## 2018-05-25 RX ADMIN — MODAFINIL 100 MILLIGRAM(S): 200 TABLET ORAL at 12:16

## 2018-05-25 RX ADMIN — INSULIN HUMAN 8 UNIT(S): 100 INJECTION, SOLUTION SUBCUTANEOUS at 18:29

## 2018-05-25 RX ADMIN — MIDODRINE HYDROCHLORIDE 15 MILLIGRAM(S): 2.5 TABLET ORAL at 12:14

## 2018-05-25 RX ADMIN — Medication 1 TABLET(S): at 12:09

## 2018-05-25 RX ADMIN — WARFARIN SODIUM 2 MILLIGRAM(S): 2.5 TABLET ORAL at 22:36

## 2018-05-25 RX ADMIN — MIDODRINE HYDROCHLORIDE 15 MILLIGRAM(S): 2.5 TABLET ORAL at 04:49

## 2018-05-25 RX ADMIN — PANTOPRAZOLE SODIUM 40 MILLIGRAM(S): 20 TABLET, DELAYED RELEASE ORAL at 12:16

## 2018-05-25 RX ADMIN — MIDODRINE HYDROCHLORIDE 15 MILLIGRAM(S): 2.5 TABLET ORAL at 19:37

## 2018-05-25 RX ADMIN — INSULIN HUMAN 2: 100 INJECTION, SOLUTION SUBCUTANEOUS at 12:37

## 2018-05-25 RX ADMIN — Medication 5 MILLILITER(S): at 18:29

## 2018-05-25 RX ADMIN — Medication 250 MILLIGRAM(S): at 12:08

## 2018-05-25 RX ADMIN — Medication 400 MILLIGRAM(S): at 00:49

## 2018-05-25 RX ADMIN — PIPERACILLIN AND TAZOBACTAM 200 GRAM(S): 4; .5 INJECTION, POWDER, LYOPHILIZED, FOR SOLUTION INTRAVENOUS at 10:41

## 2018-05-25 RX ADMIN — Medication 1 APPLICATION(S): at 06:42

## 2018-05-25 RX ADMIN — CALCITRIOL 0.25 MICROGRAM(S): 0.5 CAPSULE ORAL at 12:15

## 2018-05-25 RX ADMIN — PIPERACILLIN AND TAZOBACTAM 200 GRAM(S): 4; .5 INJECTION, POWDER, LYOPHILIZED, FOR SOLUTION INTRAVENOUS at 18:40

## 2018-05-25 RX ADMIN — Medication 15 MILLIGRAM(S): at 22:40

## 2018-05-25 RX ADMIN — INSULIN HUMAN 8 UNIT(S): 100 INJECTION, SOLUTION SUBCUTANEOUS at 06:50

## 2018-05-25 NOTE — PROGRESS NOTE ADULT - PROBLEM SELECTOR PLAN 8
As noted above. New fevers + elevated WBCs, started on zosyn 5/24-->     #Fungemia  RESOLVED. Noted to have Candida Tropicalis growing in blood cultures and completed fluconazole course.

## 2018-05-25 NOTE — PROGRESS NOTE ADULT - SUBJECTIVE AND OBJECTIVE BOX
Patient seen and examined at bedside. seen in am and again on HD   tolerated 2.4 L UF yesterday   still vent dependent     acetaminophen   Tablet 650 milliGRAM(s) every 6 hours PRN  acetylcysteine 20% Inhalation 5 milliLiter(s) two times a day  aspirin  chewable 81 milliGRAM(s) daily  atorvastatin 80 milliGRAM(s) at bedtime  calcitriol  Solution 0.25 MICROGram(s) daily  collagenase Ointment 1 Application(s) daily  Dakins Solution - Full Strength 1 Application(s) daily  dextrose 5%. 1000 milliLiter(s) <Continuous>  dextrose 50% Injectable 12.5 Gram(s) once  dextrose 50% Injectable 25 Gram(s) once  dextrose 50% Injectable 25 Gram(s) once  dextrose Gel 1 Dose(s) once PRN  escitalopram 5 milliGRAM(s) daily  folic acid 1 milliGRAM(s) daily  glucagon  Injectable 1 milliGRAM(s) once PRN  hydrocortisone 15 milliGRAM(s) every 12 hours  insulin regular  human corrective regimen sliding scale   every 6 hours  insulin regular  human recombinant 8 Unit(s) every 6 hours  levETIRAcetam  Solution 500 milliGRAM(s) every 24 hours  metoclopramide 5 milliGRAM(s) every 12 hours  midodrine 15 milliGRAM(s) every 8 hours  modafinil 100 milliGRAM(s) daily  multivitamin 1 Tablet(s) daily  pantoprazole   Suspension 40 milliGRAM(s) daily  piperacillin/tazobactam IVPB. 2.25 Gram(s) every 8 hours  silver sulfADIAZINE 1% Cream 1 Application(s) daily  warfarin 2 milliGRAM(s) once      Allergies    No Known Allergies    Intolerances        T(C): , Max: 39.3 (05-24-18 @ 19:46)  T(F): , Max: 102.7 (05-24-18 @ 19:46)  HR: 67 (05-25-18 @ 16:10)  BP: 85/63 (05-25-18 @ 16:10)  BP(mean): --  RR: 30 (05-25-18 @ 16:10)  SpO2: 99% (05-25-18 @ 16:10)  Wt(kg): --    05-24 @ 07:01  -  05-25 @ 07:00  --------------------------------------------------------  IN:    Enteral Tube Flush: 360 mL    Jevity: 1368 mL    Other: 600 mL    Solution: 100 mL  Total IN: 2428 mL    OUT:    Other: 3100 mL  Total OUT: 3100 mL    Total NET: -672 mL      05-25 @ 07:01  - 05-25 @ 17:05  --------------------------------------------------------  IN:    Jevity: 500 mL    Solution: 350 mL  Total IN: 850 mL    OUT:    Other: 1900 mL  Total OUT: 1900 mL    Total NET: -1050 mL              PHYSICAL EXAM:  Constitutional: lethargic on vent   No acute distress  ENMT: .  No cyanosis.  Respiratory: decreased BS    Cardiovascular: S1, S2.  Regular rate and rhythm.    Gastrointestinal: soft, non-tender,  Extremities: Warm.  decreased lower extremity edema.    Skin: Warm. Dry.      ACCESS:       LABS:                        8.0    29.4  )-----------( 145      ( 25 May 2018 08:09 )             26.7     05-25    135  |  92<L>  |  27<H>  ----------------------------<  271<H>  3.9   |  28  |  2.18<H>    Ca    8.5      25 May 2018 08:09  Phos  2.3     05-25  Mg     1.9     05-25        PT/INR - ( 25 May 2018 08:09 )   PT: 27.5 sec;   INR: 2.43          PTT - ( 25 May 2018 08:09 )  PTT:40.8 sec          RADIOLOGY & ADDITIONAL STUDIES:

## 2018-05-25 NOTE — PROGRESS NOTE ADULT - SUBJECTIVE AND OBJECTIVE BOX
Physical Medicine and Rehabilitation Progress Note    HUNTER BRIZUELA    [id number]    Patient is a 63y old  Male who presents with a chief complaint of     Vital Signs Last 24 Hrs  T(C): 36.2 (25 May 2018 13:10), Max: 39.3 (24 May 2018 19:46)  T(F): 97.1 (25 May 2018 13:10), Max: 102.7 (24 May 2018 19:46)  HR: 93 (25 May 2018 13:10) (55 - 97)  BP: 109/49 (25 May 2018 13:10) (80/57 - 109/49)  BP(mean): --  RR: 20 (25 May 2018 13:10) (12 - 20)  SpO2: 98% (25 May 2018 13:10) (97% - 100%)    Current Functional Status in Physical Therapy:  Receiving  HD,  awake and stable.  Both upper ext; wrist-hand resting splints fit well, no skin irritation.  Seen by PT today,    Bed Mobility: dependent x 2  person    Transfers:    Ambulation:      Impression:  1.  Septic shock / aspiration pneumonia  2. Acute respiratory failaure, s/p tracheostomy, PEG placement  3.  Acute brain stem infarction       Recommendations:  1.  PT for therapeutic  ex.  and  bed mobility    2.  Subacute rehab.  placement

## 2018-05-25 NOTE — CONSULT NOTE ADULT - ATTENDING COMMENTS
There are multiple possible sources of infection here.  Left arm abscess + sacral decub are likely sources.  ? line infection.  Doubt pneumonia.  Secretions do not appear thick.  Pseudomonas on tracheal aspirate is likely a colonizer of trach.      This is important because one must assume patient is colonized with pseudomonas which is largely pan sensitive    Agree with vancomycin and zosyn.  Will follow up cultures and adjust appropriately    ID service will follow with you

## 2018-05-25 NOTE — PROGRESS NOTE ADULT - PROBLEM SELECTOR PLAN 4
[___ Day(s)] : [unfilled] day(s) [Constant] : constant [de-identified] : Sore throat; stuffy nose [FreeTextEntry8] : w [FreeTextEntry6] : 14 year old with sore throat and stuffy nose since yesterday.\par \par No fever, occasional dry cough, no headache or loss of appetite. Anemia of chronic renal disease with hemoglobin 8.9  Ferritin: 1377 and T sat% 34.  No EPO or iron at present due to probable GILMER vs CKD.

## 2018-05-25 NOTE — CONSULT NOTE ADULT - SUBJECTIVE AND OBJECTIVE BOX
INFECTIOUS DISEASE RE-CONSULT NOTE    Previously known to our service - Dr. Dye signed off on 4/18/18.    CHIEF COMPLAINT: Patient is a 63y old  Male who presents with a chief complaint of generalized weakness.    HPI:  63M PMH HFrEF 10-15% (ischemic), CAD s/p STEMI 7/2017, a-fib s/p AICD, HTN, DM2 on insulin, CKD now on dialysis (started this admission, has HD catheter), gout, who presented with a chief complaint of generalized weakness (admitted 3/10/2018) due septic shock POA 2/2 LE cellulitis complicated by ATN requiring new dialysis. Course complicated by AMS likely from brainstem infarct 2/2 LV thrombus vs toxic metabolic encephalopathy. Further complicated by development of candidemia (candida tropicalis) (completed fluconazole 4/21) and sepsis 2/2 klebsiella bacteremia due to UTI (completed ceftriaxone 4/21). Then completed a course of Zosyn (4/27 to 5/7) for aspiration PNA due to klebsiella (ceftriaxone resistant) and alcaligenes faecalis (pansensitive). He is status post tracheostomy and PEG placement, and has poor mental status but follows very simple commands (open your eyes, wiggle your toes). With complications of hypotension, tolerating HD w/o albumin, on steroids due to suspected adrenal insufficiency.    Infectious disease team re-consulted due to new fevers (100.9F on 5/24 with Zosyn started, and 102.7F last night). He has a sacral decubitus ulcer that appears infected (wound culture sent today) as well as a left forearm abscess (managed by wound nurse, primary team plans to consult plastic surgery). Primary team started patient on Vancomycin today.  At bedside, unable to obtain ROS due to his chronic mental status.    PAST MEDICAL & SURGICAL HISTORY:  Type 2 diabetes mellitus with diabetic peripheral angiopathy without gangrene, with long-term curren  Essential hypertension, benign  Gout  Pacemaker  Chronic systolic heart failure  Myocardial infarction  No significant past surgical history    REVIEW OF SYSTEMS: negative except as stated in HPI.    MEDICATIONS  (STANDING):  acetylcysteine 20% Inhalation 5 milliLiter(s) Inhalation two times a day  aspirin  chewable 81 milliGRAM(s) Oral daily  atorvastatin 80 milliGRAM(s) Oral at bedtime  calcitriol  Solution 0.25 MICROGram(s) Oral daily  collagenase Ointment 1 Application(s) Topical daily  Dakins Solution - Full Strength 1 Application(s) Topical daily  dextrose 5%. 1000 milliLiter(s) (50 mL/Hr) IV Continuous <Continuous>  dextrose 50% Injectable 12.5 Gram(s) IV Push once  dextrose 50% Injectable 25 Gram(s) IV Push once  dextrose 50% Injectable 25 Gram(s) IV Push once  escitalopram 5 milliGRAM(s) Oral daily  folic acid 1 milliGRAM(s) Oral daily  hydrocortisone 15 milliGRAM(s) Oral every 12 hours  insulin regular  human corrective regimen sliding scale   SubCutaneous every 6 hours  insulin regular  human recombinant 8 Unit(s) SubCutaneous every 6 hours  levETIRAcetam  Solution 500 milliGRAM(s) Oral every 24 hours  metoclopramide 5 milliGRAM(s) Oral every 12 hours  midodrine 15 milliGRAM(s) Oral every 8 hours  midodrine 10 milliGRAM(s) Oral once  modafinil 100 milliGRAM(s) Oral daily  multivitamin 1 Tablet(s) Oral daily  pantoprazole   Suspension 40 milliGRAM(s) Oral daily  piperacillin/tazobactam IVPB. 2.25 Gram(s) IV Intermittent every 8 hours  silver sulfADIAZINE 1% Cream 1 Application(s) Topical daily  vancomycin  IVPB 1000 milliGRAM(s) IV Intermittent once  warfarin 2 milliGRAM(s) Oral once    MEDICATIONS  (PRN):  acetaminophen   Tablet 650 milliGRAM(s) Oral every 6 hours PRN For Temp greater than 38 C (100.4 F)  dextrose Gel 1 Dose(s) Oral once PRN Blood Glucose LESS THAN 70 milliGRAM(s)/deciliter  glucagon  Injectable 1 milliGRAM(s) IntraMuscular once PRN Glucose LESS THAN 70 milligrams/deciliter      Allergies    No Known Allergies    Intolerances    FAMILY HISTORY: unable to obtain due to clinical condition.      SOCIAL HISTORY: unable to obtain due to clinical condition.    Vital Signs Last 24 Hrs  T(C): 36.9 (25 May 2018 08:53), Max: 39.3 (24 May 2018 19:46)  T(F): 98.4 (25 May 2018 08:53), Max: 102.7 (24 May 2018 19:46)  HR: 90 (25 May 2018 11:51) (55 - 97)  BP: 90/56 (25 May 2018 08:53) (80/57 - 105/73)  BP(mean): --  RR: 20 (25 May 2018 08:53) (12 - 20)  SpO2: 100% (25 May 2018 11:51) (97% - 100%)    PHYSICAL EXAM:  GEN: alert, NAD, comfortable in bed, with tracheostomy and PEG in place (clean) as well as HD catheter in right upper chest wall, AICD in left upper chest wall  CV: RRR, S1/S2, no murmurs appreciated  RESP: bronchial sounds bilaterally, with tracheostomy  ABD: soft, BS+, nontender, nondistended, no guarding/rebound, clean PEG in place  EXTREMITIES: WWP, pulses 2+ in all 4 extremities, no peripheral edema  MSK: full range of motion of all 4 extremities  SKIN: 3x3cm abscess (dressed by wound RN) on LUE forearm, sacral decubitus ulcer  NEURO: alert, moves all extremities equally, follows simple commands, not oriented to name/place/time    LABS: reviewed.                       8.0    29.4  )-----------( 145      ( 25 May 2018 08:09 )             26.7     05-25    135  |  92<L>  |  27<H>  ----------------------------<  271<H>  3.9   |  28  |  2.18<H>    Ca    8.5      25 May 2018 08:09  Phos  2.3     05-25  Mg     1.9     05-25      PT/INR - ( 25 May 2018 08:09 )   PT: 27.5 sec;   INR: 2.43          PTT - ( 25 May 2018 08:09 )  PTT:40.8 sec    Culture - Blood (collected 24 May 2018 03:16)  Source: .Blood Blood  Preliminary Report (25 May 2018 04:01):    No growth at 1 day.    Culture - Blood (collected 24 May 2018 03:16)  Source: .Blood Blood  Preliminary Report (25 May 2018 04:01):    No growth at 1 day.    Culture - sacral decubitus - 5/25/18 - pending.    RADIOLOGY AND ADDITIONAL TESTS: reviewed.    < from: Xray Chest 1 View- PORTABLE-Urgent (05.24.18 @ 03:45) >  Bilateral pleural effusions and lower lung infiltrates grossly unchanged   compared to prior exam 5/22/2018. Right-sided venous catheter, left-sided   implanted cardiac device and tracheostomy again noted and appear   unchanged.

## 2018-05-25 NOTE — PROGRESS NOTE ADULT - ASSESSMENT
HAs been tolerating UF with HD and attempted again today to aid vent wean  but ended treatment early as developed hypotension and tachycardia -- did attain 1.9 L UF   also r/o sepsis with fever and leukocytosis-- f/u cx , antibiotics   may try HD again tomorrow if stable

## 2018-05-25 NOTE — PROGRESS NOTE ADULT - PROBLEM SELECTOR PLAN 5
Failure likely 2/2 to ischemic ATN with hypoperfusion in setting of shock. Baseline unknown but presenting Cr 3.93 with associated metabolic derangements. Started on HD during course with renal following. Permacath replaced multiple times over course for bacteremia and fungemia. Has R sided Permacath.   - HD per nephro

## 2018-05-25 NOTE — PROGRESS NOTE ADULT - ATTENDING COMMENTS
Sepsis 2/2 to likely infected unstagable sacral decub and LUE abscess: Vanco/zosyn, ID consult, Plastics to I&D arm and obtain cx. Cont to closely monitor.

## 2018-05-25 NOTE — ADVANCED PRACTICE NURSE CONSULT - RECOMMEDATIONS
Sacral/left buttock pressure injury - cleanse with wound cleanser, apply Dakin's .25%, wet to damp dressing once daily x 7 days.  Bilateral clavicle pressure injuries - apply Mepilex Lite dressing every 3 days or prn if saturated.  Left forearm  - Silvadene ordered by house staff  LLE - continue to apply hydrogel as ordered.   Discussed assessment and recommendations with Shannon and house staff.

## 2018-05-25 NOTE — PROGRESS NOTE ADULT - SUBJECTIVE AND OBJECTIVE BOX
INTERVAL HPI/OVERNIGHT EVENTS: several fevers overnight, given ibuprofen, no other acute changs     SUBJECTIVE: Patient seen and examined at bedside.unable to assess ros     OBJECTIVE:    VITAL SIGNS:  ICU Vital Signs Last 24 Hrs  T(C): 37 (25 May 2018 05:18), Max: 39.3 (24 May 2018 19:46)  T(F): 98.6 (25 May 2018 05:18), Max: 102.7 (24 May 2018 19:46)  HR: 88 (25 May 2018 06:36) (55 - 97)  BP: 88/59 (25 May 2018 05:18) (80/57 - 105/73)  BP(mean): --  ABP: --  ABP(mean): --  RR: 18 (25 May 2018 06:36) (12 - 18)  SpO2: 99% (25 May 2018 06:36) (97% - 100%)    Mode: AC/ CMV (Assist Control/ Continuous Mandatory Ventilation), RR (machine): 10, TV (machine): 500, FiO2: 40, PEEP: 5, ITime: 0.85, MAP: 11, PIP: 26    05-24 @ 07:01  -  05-25 @ 07:00  --------------------------------------------------------  IN: 2184 mL / OUT: 3100 mL / NET: -916 mL      CAPILLARY BLOOD GLUCOSE      POCT Blood Glucose.: 270 mg/dL (25 May 2018 06:43)      PHYSICAL EXAM:    General:on ACVC, tachypneic, comfortable, NAD, sleeping   HEENT: NC/AT; PERRL, anicteric sclera; MMM  Neck: +trach; no secretions  Cardiovascular: +S1/S2; regular and no tachycardia  Respiratory: diminished breath sounds b/l; diffuse b/l crackles; no rhonchi   Gastrointestinal: distended and firm but non-tender and bowel sounds present; PEG in place, no drainage around site, feeds running; no TTP, BSx4  Extremities: WWP; 2+ pitting edema b/l; chronic venous stasis changes bilateral LE, bandage round L ankle and leg   Vascular: palp peripheral pulses b/l  Neurological:  Alert and awake. Appears to respond to name, waxing and waning, occasionally follows commands "squeeze hand'   Skin: Chronic venous stasis changes b/l LE with dressings applied by wound care    MEDICATIONS:  MEDICATIONS  (STANDING):  acetylcysteine 20% Inhalation 5 milliLiter(s) Inhalation two times a day  aspirin  chewable 81 milliGRAM(s) Oral daily  atorvastatin 80 milliGRAM(s) Oral at bedtime  calcitriol  Solution 0.25 MICROGram(s) Oral daily  chlorhexidine 0.12% Liquid 15 milliLiter(s) Swish and Spit two times a day  collagenase Ointment 1 Application(s) Topical daily  dextrose 5%. 1000 milliLiter(s) (50 mL/Hr) IV Continuous <Continuous>  dextrose 50% Injectable 12.5 Gram(s) IV Push once  dextrose 50% Injectable 25 Gram(s) IV Push once  dextrose 50% Injectable 25 Gram(s) IV Push once  escitalopram 5 milliGRAM(s) Oral daily  folic acid 1 milliGRAM(s) Oral daily  hydrocortisone 15 milliGRAM(s) Oral every 12 hours  insulin regular  human corrective regimen sliding scale   SubCutaneous every 6 hours  insulin regular  human recombinant 8 Unit(s) SubCutaneous every 6 hours  levETIRAcetam  Solution 500 milliGRAM(s) Oral every 24 hours  metoclopramide 5 milliGRAM(s) Oral every 8 hours  midodrine 15 milliGRAM(s) Oral every 8 hours  modafinil 100 milliGRAM(s) Oral daily  multivitamin 1 Tablet(s) Oral daily  pantoprazole   Suspension 40 milliGRAM(s) Oral daily  piperacillin/tazobactam IVPB. 2.25 Gram(s) IV Intermittent every 8 hours  silver sulfADIAZINE 1% Cream 1 Application(s) Topical daily    MEDICATIONS  (PRN):  acetaminophen    Suspension. 650 milliGRAM(s) Oral every 6 hours PRN Moderate Pain (4 - 6)  dextrose Gel 1 Dose(s) Oral once PRN Blood Glucose LESS THAN 70 milliGRAM(s)/deciliter  glucagon  Injectable 1 milliGRAM(s) IntraMuscular once PRN Glucose LESS THAN 70 milligrams/deciliter      ALLERGIES:  Allergies    No Known Allergies    Intolerances        LABS:                        8.2    18.5  )-----------( 139      ( 24 May 2018 12:17 )             28.8     05-24    136  |  93<L>  |  33<H>  ----------------------------<  141<H>  4.4   |  31  |  2.41<H>    Ca    8.4      24 May 2018 12:17  Phos  2.7     05-24  Mg     2.0     05-24      PT/INR - ( 24 May 2018 12:17 )   PT: 26.8 sec;   INR: 2.37

## 2018-05-25 NOTE — CONSULT NOTE ADULT - ASSESSMENT
IMPRESSION:  Sepsis with unclear source, possibly 2/2 LUE abscess or sacral decubitus ulcer. On Zosyn (5/24--) and Vancomycin (5/25--).    -Patient previously with candida tropicalis bacteremia likely 2/2 dialysis cathter s/p removal 4/8/18 with subsequent clearance of fungemia - completed full course of fluconazole on 4/21/18 with a negative RUKHSANA and ophtho eval.  -Previously with Klebsiella bacteremia 2/2 UTI - completed ceftriaxone on 4/21/18.  -Previously with aspiration pneumonia (ceftriaxone-resistant Klebsiella with pan-sensitive alcaligenes faecalis in sputum) - completed Zosyn 5/7/18.    RECOMMEND:  1. Continue Vancomycin and Zosyn for now, renally dosed.  2. Follow up wound culture results (LUE abscess and sacral decubitus ulcer).  3. Await surgical evaluation, follow up recs.  4. Await final attending recommendations - patient might need UA/urine culture to be sent via straight catheterization.    Thank you for the consult, ID will follow.  For any ID concerns after hours, may call ID attending on call ([email or web address]) IMPRESSION:  Sepsis likely due to bacteremia (gram positive cocci in clusters, likely staph), with source possibly 2/2 LUE abscess, sacral decubitus ulcer, or palindrome catheter infection. Currently on Zosyn (5/24--) and Vancomycin (5/25--).    -Patient previously with candida tropicalis bacteremia likely 2/2 dialysis cathter s/p removal 4/8/18 with subsequent clearance of fungemia - completed full course of fluconazole on 4/21/18 with a negative RUKHSANA and ophtho eval.  -Previously with Klebsiella bacteremia 2/2 UTI - completed ceftriaxone on 4/21/18.  -Previously with aspiration pneumonia (ceftriaxone-resistant Klebsiella with pan-sensitive alcaligenes faecalis in sputum) - completed Zosyn 5/7/18.    RECOMMEND:  1. Continue Vancomycin and Zosyn for now, renally dosed.  2. Follow up blood culture ID/sensitivities.  3. Follow up wound culture results (LUE abscess and sacral decubitus ulcer).  4. Await surgical evaluation, follow up recs.    Thank you for the consult, ID will follow.  For any ID concerns after hours, may call ID attending on call ([email or web address]) IMPRESSION:  Sepsis with unclear source, possibly 2/2 LUE abscess or sacral decubitus ulcer. Currently on Zosyn (5/24--) and Vancomycin (5/25--).    -Patient previously with candida tropicalis bacteremia likely 2/2 dialysis cathter s/p removal 4/8/18 with subsequent clearance of fungemia - completed full course of fluconazole on 4/21/18 with a negative RUKHSANA and ophtho eval.  -Previously with Klebsiella bacteremia 2/2 UTI - completed ceftriaxone on 4/21/18.  -Previously with aspiration pneumonia (ceftriaxone-resistant Klebsiella with pan-sensitive alcaligenes faecalis in sputum) - completed Zosyn 5/7/18.    RECOMMEND:  1. Continue Vancomycin and Zosyn for now, renally dosed.  2. Follow up blood cultures.  3. Follow up wound culture results (LUE abscess and sacral decubitus ulcer).  4. Await surgical evaluation, follow up recs.    Thank you for the consult, ID will follow.  For any ID concerns after hours, may call ID attending on call ([email or web address]) IMPRESSION:  Sepsis with unclear source, possibly 2/2 LUE abscess or sacral decubitus ulcer. Currently on Zosyn (5/24--) and Vancomycin (5/25--).    -Patient previously with candida tropicalis bacteremia suspected 2/2 dialysis catheter s/p removal 4/8/18 with subsequent clearance of fungemia - completed full course of fluconazole on 4/21/18 with a negative RUKHSANA and ophtho eval.  -Previously with Klebsiella bacteremia 2/2 UTI - completed ceftriaxone on 4/21/18.  -Previously with aspiration pneumonia (ceftriaxone-resistant Klebsiella with pan-sensitive alcaligenes faecalis in sputum) - completed Zosyn 5/7/18.    RECOMMEND:  1. Continue Vancomycin and Zosyn for now, renally dosed.  2. Follow up blood cultures.  3. Follow up wound culture results (LUE abscess and sacral decubitus ulcer).  4. Await surgical evaluation, follow up recs.    Thank you for the consult, ID will follow.  For any ID concerns after hours, may call ID attending on call ([email or web address])

## 2018-05-25 NOTE — PROGRESS NOTE ADULT - PROBLEM SELECTOR PLAN 10
VTE: Coumadin dosed nightly, with INR daily checks, goal 2-3  GI PPx: PPI  DNR- Made DNR, family wants escalation of care, pressors if needed.  F: No IVF in setting of HD  E: Replete electrolytes with caution in setting of HD  N: Jevity 1.5 goal rate 62cc/hr, per updated nutrition recs 5/21  Dispo: SD to [facility], likely chronic vent facility + HD, pending SW placement. Dispo plans and SD plans discussed with daughter Cristal over phone today.

## 2018-05-25 NOTE — PROGRESS NOTE ADULT - PROBLEM SELECTOR PLAN 1
Acute respiratory failure in setting of septic and cardiogenic shock, with trach placed on 4/5/18 for persistent, now chronic respiratory failure. CXR showing effusions. On daily HD.   - wean to CPAP/trach collar as tolerated  - CXR unchanged  - continues to tolerate HD w/o albumin (cannot go to LTAC if req. albumin)  - intermittent tachypnea likely a component of pain, dilaudid 0.25mg IV PRN  - intermittent fevers but w/o increase in secretions c/f pseudomonas/aspiration? Started on Zosyn 5/24-->

## 2018-05-26 DIAGNOSIS — N18.6 END STAGE RENAL DISEASE: ICD-10-CM

## 2018-05-26 LAB
ANION GAP SERPL CALC-SCNC: 17 MMOL/L — SIGNIFICANT CHANGE UP (ref 5–17)
ANION GAP SERPL CALC-SCNC: 17 MMOL/L — SIGNIFICANT CHANGE UP (ref 5–17)
APTT BLD: 41.6 SEC — HIGH (ref 27.5–37.4)
BASOPHILS NFR BLD AUTO: 0.1 % — SIGNIFICANT CHANGE UP (ref 0–2)
BUN SERPL-MCNC: 38 MG/DL — HIGH (ref 7–23)
BUN SERPL-MCNC: 40 MG/DL — HIGH (ref 7–23)
CALCIUM SERPL-MCNC: 8.7 MG/DL — SIGNIFICANT CHANGE UP (ref 8.4–10.5)
CALCIUM SERPL-MCNC: 8.9 MG/DL — SIGNIFICANT CHANGE UP (ref 8.4–10.5)
CHLORIDE SERPL-SCNC: 91 MMOL/L — LOW (ref 96–108)
CHLORIDE SERPL-SCNC: 93 MMOL/L — LOW (ref 96–108)
CO2 SERPL-SCNC: 25 MMOL/L — SIGNIFICANT CHANGE UP (ref 22–31)
CO2 SERPL-SCNC: 26 MMOL/L — SIGNIFICANT CHANGE UP (ref 22–31)
CREAT SERPL-MCNC: 2.7 MG/DL — HIGH (ref 0.5–1.3)
CREAT SERPL-MCNC: 2.83 MG/DL — HIGH (ref 0.5–1.3)
EOSINOPHIL NFR BLD AUTO: 0.2 % — SIGNIFICANT CHANGE UP (ref 0–6)
GLUCOSE BLDC GLUCOMTR-MCNC: 146 MG/DL — HIGH (ref 70–99)
GLUCOSE BLDC GLUCOMTR-MCNC: 176 MG/DL — HIGH (ref 70–99)
GLUCOSE BLDC GLUCOMTR-MCNC: 178 MG/DL — HIGH (ref 70–99)
GLUCOSE BLDC GLUCOMTR-MCNC: 185 MG/DL — HIGH (ref 70–99)
GLUCOSE BLDC GLUCOMTR-MCNC: 220 MG/DL — HIGH (ref 70–99)
GLUCOSE SERPL-MCNC: 144 MG/DL — HIGH (ref 70–99)
GLUCOSE SERPL-MCNC: 174 MG/DL — HIGH (ref 70–99)
GRAM STN FLD: SIGNIFICANT CHANGE UP
HCT VFR BLD CALC: 28.6 % — LOW (ref 39–50)
HGB BLD-MCNC: 8.8 G/DL — LOW (ref 13–17)
INR BLD: 2.39 — HIGH (ref 0.88–1.16)
LYMPHOCYTES # BLD AUTO: 6.5 % — LOW (ref 13–44)
MAGNESIUM SERPL-MCNC: 2 MG/DL — SIGNIFICANT CHANGE UP (ref 1.6–2.6)
MCHC RBC-ENTMCNC: 26.7 PG — LOW (ref 27–34)
MCHC RBC-ENTMCNC: 30.8 G/DL — LOW (ref 32–36)
MCV RBC AUTO: 86.9 FL — SIGNIFICANT CHANGE UP (ref 80–100)
MONOCYTES NFR BLD AUTO: 4.1 % — SIGNIFICANT CHANGE UP (ref 2–14)
NEUTROPHILS NFR BLD AUTO: 89.1 % — HIGH (ref 43–77)
PHOSPHATE SERPL-MCNC: 2.1 MG/DL — LOW (ref 2.5–4.5)
PLATELET # BLD AUTO: 165 K/UL — SIGNIFICANT CHANGE UP (ref 150–400)
POTASSIUM SERPL-MCNC: 3.8 MMOL/L — SIGNIFICANT CHANGE UP (ref 3.5–5.3)
POTASSIUM SERPL-MCNC: 4.1 MMOL/L — SIGNIFICANT CHANGE UP (ref 3.5–5.3)
POTASSIUM SERPL-SCNC: 3.8 MMOL/L — SIGNIFICANT CHANGE UP (ref 3.5–5.3)
POTASSIUM SERPL-SCNC: 4.1 MMOL/L — SIGNIFICANT CHANGE UP (ref 3.5–5.3)
PROTHROM AB SERPL-ACNC: 27 SEC — HIGH (ref 9.8–12.7)
RBC # BLD: 3.29 M/UL — LOW (ref 4.2–5.8)
RBC # FLD: 17.2 % — HIGH (ref 10.3–16.9)
SODIUM SERPL-SCNC: 134 MMOL/L — LOW (ref 135–145)
SODIUM SERPL-SCNC: 135 MMOL/L — SIGNIFICANT CHANGE UP (ref 135–145)
SPECIMEN SOURCE: SIGNIFICANT CHANGE UP
WBC # BLD: 24.1 K/UL — HIGH (ref 3.8–10.5)
WBC # FLD AUTO: 24.1 K/UL — HIGH (ref 3.8–10.5)

## 2018-05-26 PROCEDURE — 99233 SBSQ HOSP IP/OBS HIGH 50: CPT | Mod: GC

## 2018-05-26 RX ORDER — VANCOMYCIN HCL 1 G
500 VIAL (EA) INTRAVENOUS ONCE
Qty: 0 | Refills: 0 | Status: COMPLETED | OUTPATIENT
Start: 2018-05-26 | End: 2018-05-26

## 2018-05-26 RX ORDER — WARFARIN SODIUM 2.5 MG/1
2.5 TABLET ORAL ONCE
Qty: 0 | Refills: 0 | Status: COMPLETED | OUTPATIENT
Start: 2018-05-26 | End: 2018-05-26

## 2018-05-26 RX ORDER — ERYTHROPOIETIN 10000 [IU]/ML
4000 INJECTION, SOLUTION INTRAVENOUS; SUBCUTANEOUS ONCE
Qty: 0 | Refills: 0 | Status: COMPLETED | OUTPATIENT
Start: 2018-05-26 | End: 2018-05-26

## 2018-05-26 RX ORDER — COLLAGENASE CLOSTRIDIUM HIST. 250 UNIT/G
1 OINTMENT (GRAM) TOPICAL DAILY
Qty: 0 | Refills: 0 | Status: DISCONTINUED | OUTPATIENT
Start: 2018-05-26 | End: 2018-07-01

## 2018-05-26 RX ORDER — COLLAGENASE CLOSTRIDIUM HIST. 250 UNIT/G
1 OINTMENT (GRAM) TOPICAL DAILY
Qty: 0 | Refills: 0 | Status: DISCONTINUED | OUTPATIENT
Start: 2018-05-26 | End: 2018-05-26

## 2018-05-26 RX ORDER — ACETAMINOPHEN 500 MG
650 TABLET ORAL EVERY 6 HOURS
Qty: 0 | Refills: 0 | Status: DISCONTINUED | OUTPATIENT
Start: 2018-05-26 | End: 2018-05-29

## 2018-05-26 RX ORDER — SODIUM HYPOCHLORITE 0.125 %
1 SOLUTION, NON-ORAL MISCELLANEOUS
Qty: 0 | Refills: 0 | Status: COMPLETED | OUTPATIENT
Start: 2018-05-26 | End: 2018-06-02

## 2018-05-26 RX ORDER — LEVETIRACETAM 250 MG/1
250 TABLET, FILM COATED ORAL ONCE
Qty: 0 | Refills: 0 | Status: COMPLETED | OUTPATIENT
Start: 2018-05-26 | End: 2018-05-26

## 2018-05-26 RX ORDER — MIDODRINE HYDROCHLORIDE 2.5 MG/1
10 TABLET ORAL ONCE
Qty: 0 | Refills: 0 | Status: COMPLETED | OUTPATIENT
Start: 2018-05-26 | End: 2018-05-26

## 2018-05-26 RX ADMIN — PANTOPRAZOLE SODIUM 40 MILLIGRAM(S): 20 TABLET, DELAYED RELEASE ORAL at 11:25

## 2018-05-26 RX ADMIN — ERYTHROPOIETIN 4000 UNIT(S): 10000 INJECTION, SOLUTION INTRAVENOUS; SUBCUTANEOUS at 14:37

## 2018-05-26 RX ADMIN — MIDODRINE HYDROCHLORIDE 15 MILLIGRAM(S): 2.5 TABLET ORAL at 03:11

## 2018-05-26 RX ADMIN — Medication 1 MILLIGRAM(S): at 11:25

## 2018-05-26 RX ADMIN — Medication 5 MILLILITER(S): at 06:53

## 2018-05-26 RX ADMIN — CALCITRIOL 0.25 MICROGRAM(S): 0.5 CAPSULE ORAL at 11:24

## 2018-05-26 RX ADMIN — PIPERACILLIN AND TAZOBACTAM 200 GRAM(S): 4; .5 INJECTION, POWDER, LYOPHILIZED, FOR SOLUTION INTRAVENOUS at 01:46

## 2018-05-26 RX ADMIN — Medication 650 MILLIGRAM(S): at 03:08

## 2018-05-26 RX ADMIN — ESCITALOPRAM OXALATE 5 MILLIGRAM(S): 10 TABLET, FILM COATED ORAL at 11:24

## 2018-05-26 RX ADMIN — MIDODRINE HYDROCHLORIDE 15 MILLIGRAM(S): 2.5 TABLET ORAL at 11:25

## 2018-05-26 RX ADMIN — WARFARIN SODIUM 2.5 MILLIGRAM(S): 2.5 TABLET ORAL at 22:31

## 2018-05-26 RX ADMIN — INSULIN HUMAN 2: 100 INJECTION, SOLUTION SUBCUTANEOUS at 00:07

## 2018-05-26 RX ADMIN — INSULIN HUMAN 8 UNIT(S): 100 INJECTION, SOLUTION SUBCUTANEOUS at 06:48

## 2018-05-26 RX ADMIN — INSULIN HUMAN 8 UNIT(S): 100 INJECTION, SOLUTION SUBCUTANEOUS at 12:05

## 2018-05-26 RX ADMIN — MIDODRINE HYDROCHLORIDE 15 MILLIGRAM(S): 2.5 TABLET ORAL at 19:03

## 2018-05-26 RX ADMIN — Medication 650 MILLIGRAM(S): at 04:00

## 2018-05-26 RX ADMIN — Medication 100 MILLIGRAM(S): at 18:31

## 2018-05-26 RX ADMIN — Medication 1 TABLET(S): at 11:25

## 2018-05-26 RX ADMIN — LEVETIRACETAM 500 MILLIGRAM(S): 250 TABLET, FILM COATED ORAL at 15:17

## 2018-05-26 RX ADMIN — Medication 1 APPLICATION(S): at 11:26

## 2018-05-26 RX ADMIN — Medication 1 APPLICATION(S): at 06:44

## 2018-05-26 RX ADMIN — INSULIN HUMAN 4: 100 INJECTION, SOLUTION SUBCUTANEOUS at 17:33

## 2018-05-26 RX ADMIN — INSULIN HUMAN 8 UNIT(S): 100 INJECTION, SOLUTION SUBCUTANEOUS at 17:33

## 2018-05-26 RX ADMIN — INSULIN HUMAN 8 UNIT(S): 100 INJECTION, SOLUTION SUBCUTANEOUS at 00:07

## 2018-05-26 RX ADMIN — MODAFINIL 100 MILLIGRAM(S): 200 TABLET ORAL at 11:25

## 2018-05-26 RX ADMIN — Medication 15 MILLIGRAM(S): at 21:33

## 2018-05-26 RX ADMIN — Medication 5 MILLIGRAM(S): at 06:48

## 2018-05-26 RX ADMIN — PIPERACILLIN AND TAZOBACTAM 200 GRAM(S): 4; .5 INJECTION, POWDER, LYOPHILIZED, FOR SOLUTION INTRAVENOUS at 17:16

## 2018-05-26 RX ADMIN — INSULIN HUMAN 2: 100 INJECTION, SOLUTION SUBCUTANEOUS at 06:48

## 2018-05-26 RX ADMIN — Medication 5 MILLIGRAM(S): at 17:17

## 2018-05-26 RX ADMIN — Medication 15 MILLIGRAM(S): at 09:04

## 2018-05-26 RX ADMIN — Medication 81 MILLIGRAM(S): at 11:25

## 2018-05-26 RX ADMIN — Medication 5 MILLILITER(S): at 17:16

## 2018-05-26 RX ADMIN — ATORVASTATIN CALCIUM 80 MILLIGRAM(S): 80 TABLET, FILM COATED ORAL at 21:33

## 2018-05-26 RX ADMIN — LEVETIRACETAM 250 MILLIGRAM(S): 250 TABLET, FILM COATED ORAL at 18:32

## 2018-05-26 RX ADMIN — PIPERACILLIN AND TAZOBACTAM 200 GRAM(S): 4; .5 INJECTION, POWDER, LYOPHILIZED, FOR SOLUTION INTRAVENOUS at 09:04

## 2018-05-26 NOTE — PROVIDER CONTACT NOTE (OTHER) - SITUATION
Received call from microbiology regarding patient's stool specimen. Specimen is formed, per  will cancel the test.

## 2018-05-26 NOTE — PROGRESS NOTE ADULT - PROBLEM SELECTOR PLAN 10
VTE: Coumadin dosed nightly, with INR daily checks, goal 2-3  GI PPx: PPI  DNR- Made DNR, family wants escalation of care, pressors if needed.  F: No IVF in setting of HD  E: Replete electrolytes with caution in setting of HD  N: Jevity 1.5 goal rate 62cc/hr, per updated nutrition recs 5/21  Dispo: SD to [facility], likely chronic vent facility + HD, pending SW placement. Dispo plans and SD plans discussed with daughter Cristal over phone today.

## 2018-05-26 NOTE — PROGRESS NOTE ADULT - SUBJECTIVE AND OBJECTIVE BOX
Patient is a 63y old  Male who presents with a chief complaint of:    INTERVAL HPI/OVERNIGHT EVENTS: Pt more alert today, moving ext. 3 BMs today.     Review of Systems: 12 point review of systems unable to obtain 2/2 to mental status      MEDICATIONS  (STANDING):  acetylcysteine 20% Inhalation 5 milliLiter(s) Inhalation two times a day  aspirin  chewable 81 milliGRAM(s) Oral daily  atorvastatin 80 milliGRAM(s) Oral at bedtime  calcitriol  Solution 0.25 MICROGram(s) Oral daily  collagenase Ointment 1 Application(s) Topical daily  Dakins Solution - Full Strength 1 Application(s) Topical daily  dextrose 5%. 1000 milliLiter(s) (50 mL/Hr) IV Continuous <Continuous>  dextrose 50% Injectable 12.5 Gram(s) IV Push once  dextrose 50% Injectable 25 Gram(s) IV Push once  dextrose 50% Injectable 25 Gram(s) IV Push once  epoetin amy Injectable 4000 Unit(s) IV Push once  escitalopram 5 milliGRAM(s) Oral daily  folic acid 1 milliGRAM(s) Oral daily  hydrocortisone 15 milliGRAM(s) Oral every 12 hours  insulin regular  human corrective regimen sliding scale   SubCutaneous every 6 hours  insulin regular  human recombinant 8 Unit(s) SubCutaneous every 6 hours  levETIRAcetam  Solution 500 milliGRAM(s) Oral every 24 hours  metoclopramide 5 milliGRAM(s) Oral every 12 hours  midodrine 15 milliGRAM(s) Oral every 8 hours  modafinil 100 milliGRAM(s) Oral daily  multivitamin 1 Tablet(s) Oral daily  pantoprazole   Suspension 40 milliGRAM(s) Oral daily  piperacillin/tazobactam IVPB. 2.25 Gram(s) IV Intermittent every 8 hours  silver sulfADIAZINE 1% Cream 1 Application(s) Topical daily    MEDICATIONS  (PRN):  acetaminophen    Suspension. 650 milliGRAM(s) Oral every 6 hours PRN Moderate Pain (4 - 6)  acetaminophen   Tablet 650 milliGRAM(s) Oral every 6 hours PRN For Temp greater than 38 C (100.4 F)  dextrose Gel 1 Dose(s) Oral once PRN Blood Glucose LESS THAN 70 milliGRAM(s)/deciliter  glucagon  Injectable 1 milliGRAM(s) IntraMuscular once PRN Glucose LESS THAN 70 milligrams/deciliter      Allergies    No Known Allergies    Intolerances          Vital Signs Last 24 Hrs  T(C): 37.1 (26 May 2018 08:37), Max: 37.1 (26 May 2018 08:37)  T(F): 98.7 (26 May 2018 08:37), Max: 98.7 (26 May 2018 08:37)  HR: 87 (26 May 2018 08:37) (67 - 98)  BP: 84/55 (26 May 2018 08:37) (84/55 - 115/87)  BP(mean): --  RR: 30 (26 May 2018 08:37) (16 - 33)  SpO2: 100% (26 May 2018 08:37) (98% - 100%)  CAPILLARY BLOOD GLUCOSE      POCT Blood Glucose.: 176 mg/dL (26 May 2018 06:07)  POCT Blood Glucose.: 167 mg/dL (25 May 2018 23:56)  POCT Blood Glucose.: 144 mg/dL (25 May 2018 21:49)  POCT Blood Glucose.: 231 mg/dL (25 May 2018 17:46)  POCT Blood Glucose.: 158 mg/dL (25 May 2018 12:33)      05-25 @ 07:01  -  05-26 @ 07:00  --------------------------------------------------------  IN: 1187 mL / OUT: 1900 mL / NET: -713 mL        Physical Exam:    General: on ACVC, tachypneic, comfortable, NAD, sleeping   HEENT: NC/AT; PERRL, anicteric sclera; MMM  Neck: +trach; no secretions  Cardiovascular: +S1/S2; regular and no tachycardia  Respiratory: diminished breath sounds b/l; diffuse b/l crackles; no rhonchi   Gastrointestinal: distended and firm but non-tender and bowel sounds present; PEG in place, no drainage around site, feeds running; no TTP, BSx4  Extremities: WWP; 2+ pitting edema b/l; chronic venous stasis changes bilateral LE, bandage round L ankle and leg   Vascular: palp peripheral pulses b/l  Neurological:  More awake, Appears to respond to name, waxing and waning, occasionally follows commands "squeeze hand'   Skin: Chronic venous stasis changes b/l LE with dressings applied by wound care      LABS:                        8.8    24.1  )-----------( 165      ( 26 May 2018 07:02 )             28.6     05-26    135  |  93<L>  |  38<H>  ----------------------------<  174<H>  3.8   |  25  |  2.70<H>    Ca    8.7      26 May 2018 07:02  Phos  2.1     05-26  Mg     2.0     05-26      PT/INR - ( 26 May 2018 07:02 )   PT: 27.0 sec;   INR: 2.39          PTT - ( 26 May 2018 07:02 )  PTT:41.6 sec        RADIOLOGY & ADDITIONAL TESTS:    ---------------------------------------------------------------------------  I personally reviewed: [  ]EKG   [  ]CXR    [  ] CT    [  ]Other  ---------------------------------------------------------------------------  PLEASE CHECK WHEN PRESENT:     [  ]Heart Failure     [  ] Acute     [  ] Acute on Chronic     [  ] Chronic  -------------------------------------------------------------------     [  ]Diastolic [HFpEF]     [  ]Systolic [HFrEF]     [  ]Combined [HFpEF & HFrEF]     [  ]Other:  -------------------------------------------------------------------  [  ]GILMER     [  ]ATN     [  ]Reneal Medullary Necrosis     [  ]Renal Cortical Necrosis     [  ]Other Pathological Lesions:    [  ]CKD 1  [  ]CKD 2  [  ]CKD 3  [  ]CKD 4  [  ]CKD 5  [  ]Other  -------------------------------------------------------------------  [  ]Other/Unspecified:    --------------------------------------------------------------------    Abdominal Nutritional Status  [  ]Malnutrition: See Nutrition Note  [  ]Cachexia  [  ]Other:   [  ]Supplement Ordered:  [  ]Morbid Obesity (BMI >=40]

## 2018-05-26 NOTE — PROGRESS NOTE ADULT - SUBJECTIVE AND OBJECTIVE BOX
CC: SEPSIS DUE TO UNSPECIFIEDORGANISM, CELLULITIS OF      INTERVAL HISTORY:  tolerating HD well  SBP in 90s after 1.4 L UF      ROS: No chest pain, no sob, no abd pain. No n/v/d    PAST MEDICAL & SURGICAL HISTORY:  Type 2 diabetes mellitus with diabetic peripheral angiopathy without gangrene, with long-term curren  Essential hypertension, benign  Gout  Pacemaker  Chronic systolic heart failure  Myocardial infarction  No significant past surgical history      PHYSICAL EXAM:  T(C): 36.9 (05-26-18 @ 11:20), Max: 37.1 (05-26-18 @ 08:37)  HR: 113 (05-26-18 @ 11:20)  BP: 116/89 (05-26-18 @ 11:20) (84/55 - 116/89)  RR: 30 (05-26-18 @ 11:20)  SpO2: 100% (05-26-18 @ 11:20)  Wt(kg): --  I&O's Summary    25 May 2018 07:01  -  26 May 2018 07:00  --------------------------------------------------------  IN: 1187 mL / OUT: 1900 mL / NET: -713 mL      Weight   General: AAO x 3,  NAD.  HEENT: moist mucous membranes, no pallor/cyanosis.  Neck: no JVD visible.  Cardiac: S1, S2. RRR. No murmurs   Respiratory: rhonchi   Abdomen: soft. nontender. nondistended  Skin: no rashes.  Extremities: no LE edema b/l  Access:       DATA:                        8.8<L>  24.1<H> )-----------( 165      ( 26 May 2018 07:02 )             28.6<L>        134<L>  |  91<L>  |  40<H>  ----------------------------<  144<H>  Ca:8.9   (26 May 2018 11:38)  4.1     |  26     |  2.83<H>      eGFR if Non : 23 <L>  eGFR if : 26 <L>                        MEDICATIONS  (STANDING):  acetylcysteine 20% Inhalation 5 milliLiter(s) Inhalation two times a day  aspirin  chewable 81 milliGRAM(s) Oral daily  atorvastatin 80 milliGRAM(s) Oral at bedtime  calcitriol  Solution 0.25 MICROGram(s) Oral daily  collagenase Ointment 1 Application(s) Topical daily  Dakins Solution - Full Strength 1 Application(s) Topical daily  dextrose 5%. 1000 milliLiter(s) (50 mL/Hr) IV Continuous <Continuous>  dextrose 50% Injectable 12.5 Gram(s) IV Push once  dextrose 50% Injectable 25 Gram(s) IV Push once  dextrose 50% Injectable 25 Gram(s) IV Push once  epoetin amy Injectable 4000 Unit(s) IV Push once  escitalopram 5 milliGRAM(s) Oral daily  folic acid 1 milliGRAM(s) Oral daily  hydrocortisone 15 milliGRAM(s) Oral every 12 hours  insulin regular  human corrective regimen sliding scale   SubCutaneous every 6 hours  insulin regular  human recombinant 8 Unit(s) SubCutaneous every 6 hours  levETIRAcetam  Solution 500 milliGRAM(s) Oral every 24 hours  metoclopramide 5 milliGRAM(s) Oral every 12 hours  midodrine 15 milliGRAM(s) Oral every 8 hours  modafinil 100 milliGRAM(s) Oral daily  multivitamin 1 Tablet(s) Oral daily  pantoprazole   Suspension 40 milliGRAM(s) Oral daily  piperacillin/tazobactam IVPB. 2.25 Gram(s) IV Intermittent every 8 hours  silver sulfADIAZINE 1% Cream 1 Application(s) Topical daily    MEDICATIONS  (PRN):  acetaminophen    Suspension. 650 milliGRAM(s) Oral every 6 hours PRN Moderate Pain (4 - 6)  acetaminophen   Tablet 650 milliGRAM(s) Oral every 6 hours PRN For Temp greater than 38 C (100.4 F)  dextrose Gel 1 Dose(s) Oral once PRN Blood Glucose LESS THAN 70 milliGRAM(s)/deciliter  glucagon  Injectable 1 milliGRAM(s) IntraMuscular once PRN Glucose LESS THAN 70 milligrams/deciliter

## 2018-05-26 NOTE — PROGRESS NOTE ADULT - PROBLEM SELECTOR PLAN 1
continue HD - evaluate daily for hemodialysis  UF of 2.0 L as tolerated  no IV albumin required  continue Midodrine

## 2018-05-26 NOTE — PROGRESS NOTE ADULT - PROBLEM SELECTOR PLAN 1
Left Arm I&D'd and no puss. Likely source is infected sacral decub. Cont vanco/zosyn. Improving clinically.

## 2018-05-26 NOTE — PROGRESS NOTE ADULT - PROBLEM SELECTOR PLAN 2
Acute respiratory failure in setting of septic and cardiogenic shock, with trach placed on 4/5/18 for persistent, now chronic respiratory failure. CXR showing effusions. On daily HD.   - wean to CPAP/trach collar as tolerated  - CXR unchanged  - continues to tolerate HD w/o albumin (cannot go to LTAC if req. albumin)  - intermittent tachypnea likely a component of pain, dilaudid 0.25mg IV PRN  - intermittent fevers but w/o increase in secretions c/f pseudomonas/aspiration? Started on Zosyn 5/24-->  Started on Vancomycin

## 2018-05-26 NOTE — PROGRESS NOTE ADULT - SUBJECTIVE AND OBJECTIVE BOX
Patient was seen and evaluated on dialysis.   Patient is tolerating the procedure well.   HR: 113 (05-26-18 @ 11:20)  BP: 116/89 (05-26-18 @ 11:20) pusle 65, /67  Continue dialysis:   Dialyzer:  Revaclear 300        QB: 400        QD: 500   Goal UF 1.5 over 3 Hours

## 2018-05-27 LAB
-  AMIKACIN: SIGNIFICANT CHANGE UP
-  AMIKACIN: SIGNIFICANT CHANGE UP
-  AMPICILLIN/SULBACTAM: SIGNIFICANT CHANGE UP
-  AMPICILLIN/SULBACTAM: SIGNIFICANT CHANGE UP
-  AMPICILLIN: SIGNIFICANT CHANGE UP
-  AZTREONAM: SIGNIFICANT CHANGE UP
-  AZTREONAM: SIGNIFICANT CHANGE UP
-  CEFAZOLIN: SIGNIFICANT CHANGE UP
-  CEFAZOLIN: SIGNIFICANT CHANGE UP
-  CEFEPIME: SIGNIFICANT CHANGE UP
-  CEFOTAXIME: SIGNIFICANT CHANGE UP
-  CEFOXITIN: SIGNIFICANT CHANGE UP
-  CEFTAZIDIME: SIGNIFICANT CHANGE UP
-  CEFTAZIDIME: SIGNIFICANT CHANGE UP
-  CEFTRIAXONE: SIGNIFICANT CHANGE UP
-  CEFUROXIME: SIGNIFICANT CHANGE UP
-  CIPROFLOXACIN: SIGNIFICANT CHANGE UP
-  ERTAPENEM: SIGNIFICANT CHANGE UP
-  GENTAMICIN: SIGNIFICANT CHANGE UP
-  IMIPENEM: SIGNIFICANT CHANGE UP
-  IMIPENEM: SIGNIFICANT CHANGE UP
-  LEVOFLOXACIN: SIGNIFICANT CHANGE UP
-  MEROPENEM: SIGNIFICANT CHANGE UP
-  PIPERACILLIN/TAZOBACTAM: SIGNIFICANT CHANGE UP
-  TIGECYCLINE: SIGNIFICANT CHANGE UP
-  TOBRAMYCIN: SIGNIFICANT CHANGE UP
-  TRIMETHOPRIM/SULFAMETHOXAZOLE: SIGNIFICANT CHANGE UP
-  TRIMETHOPRIM/SULFAMETHOXAZOLE: SIGNIFICANT CHANGE UP
ANION GAP SERPL CALC-SCNC: 17 MMOL/L — SIGNIFICANT CHANGE UP (ref 5–17)
APTT BLD: 40 SEC — HIGH (ref 27.5–37.4)
BUN SERPL-MCNC: 30 MG/DL — HIGH (ref 7–23)
CALCIUM SERPL-MCNC: 8.5 MG/DL — SIGNIFICANT CHANGE UP (ref 8.4–10.5)
CHLORIDE SERPL-SCNC: 94 MMOL/L — LOW (ref 96–108)
CO2 SERPL-SCNC: 25 MMOL/L — SIGNIFICANT CHANGE UP (ref 22–31)
CREAT SERPL-MCNC: 2.25 MG/DL — HIGH (ref 0.5–1.3)
GLUCOSE BLDC GLUCOMTR-MCNC: 115 MG/DL — HIGH (ref 70–99)
GLUCOSE BLDC GLUCOMTR-MCNC: 177 MG/DL — HIGH (ref 70–99)
GLUCOSE BLDC GLUCOMTR-MCNC: 221 MG/DL — HIGH (ref 70–99)
GLUCOSE BLDC GLUCOMTR-MCNC: 264 MG/DL — HIGH (ref 70–99)
GLUCOSE BLDC GLUCOMTR-MCNC: 296 MG/DL — HIGH (ref 70–99)
GLUCOSE SERPL-MCNC: 314 MG/DL — HIGH (ref 70–99)
HCT VFR BLD CALC: 29.1 % — LOW (ref 39–50)
HGB BLD-MCNC: 8.6 G/DL — LOW (ref 13–17)
INR BLD: 2.35 — HIGH (ref 0.88–1.16)
MAGNESIUM SERPL-MCNC: 2 MG/DL — SIGNIFICANT CHANGE UP (ref 1.6–2.6)
MCHC RBC-ENTMCNC: 26.7 PG — LOW (ref 27–34)
MCHC RBC-ENTMCNC: 29.6 G/DL — LOW (ref 32–36)
MCV RBC AUTO: 90.4 FL — SIGNIFICANT CHANGE UP (ref 80–100)
METHOD TYPE: SIGNIFICANT CHANGE UP
PHOSPHATE SERPL-MCNC: 1.5 MG/DL — LOW (ref 2.5–4.5)
PLATELET # BLD AUTO: 166 K/UL — SIGNIFICANT CHANGE UP (ref 150–400)
POTASSIUM SERPL-MCNC: 4.5 MMOL/L — SIGNIFICANT CHANGE UP (ref 3.5–5.3)
POTASSIUM SERPL-SCNC: 4.5 MMOL/L — SIGNIFICANT CHANGE UP (ref 3.5–5.3)
PROTHROM AB SERPL-ACNC: 26.6 SEC — HIGH (ref 9.8–12.7)
RBC # BLD: 3.22 M/UL — LOW (ref 4.2–5.8)
RBC # FLD: 17.5 % — HIGH (ref 10.3–16.9)
SODIUM SERPL-SCNC: 136 MMOL/L — SIGNIFICANT CHANGE UP (ref 135–145)
WBC # BLD: 16.9 K/UL — HIGH (ref 3.8–10.5)
WBC # FLD AUTO: 16.9 K/UL — HIGH (ref 3.8–10.5)

## 2018-05-27 PROCEDURE — 99233 SBSQ HOSP IP/OBS HIGH 50: CPT | Mod: GC

## 2018-05-27 RX ORDER — WARFARIN SODIUM 2.5 MG/1
2.5 TABLET ORAL ONCE
Qty: 0 | Refills: 0 | Status: COMPLETED | OUTPATIENT
Start: 2018-05-27 | End: 2018-05-27

## 2018-05-27 RX ADMIN — Medication 1 TABLET(S): at 12:44

## 2018-05-27 RX ADMIN — INSULIN HUMAN 8 UNIT(S): 100 INJECTION, SOLUTION SUBCUTANEOUS at 12:47

## 2018-05-27 RX ADMIN — ATORVASTATIN CALCIUM 80 MILLIGRAM(S): 80 TABLET, FILM COATED ORAL at 23:21

## 2018-05-27 RX ADMIN — Medication 15 MILLIGRAM(S): at 23:27

## 2018-05-27 RX ADMIN — Medication 1 MILLIGRAM(S): at 12:43

## 2018-05-27 RX ADMIN — Medication 1 APPLICATION(S): at 00:04

## 2018-05-27 RX ADMIN — Medication 5 MILLILITER(S): at 17:07

## 2018-05-27 RX ADMIN — PANTOPRAZOLE SODIUM 40 MILLIGRAM(S): 20 TABLET, DELAYED RELEASE ORAL at 12:44

## 2018-05-27 RX ADMIN — INSULIN HUMAN 8 UNIT(S): 100 INJECTION, SOLUTION SUBCUTANEOUS at 06:51

## 2018-05-27 RX ADMIN — Medication 1 APPLICATION(S): at 12:48

## 2018-05-27 RX ADMIN — Medication 1 APPLICATION(S): at 17:06

## 2018-05-27 RX ADMIN — INSULIN HUMAN 8 UNIT(S): 100 INJECTION, SOLUTION SUBCUTANEOUS at 18:29

## 2018-05-27 RX ADMIN — MIDODRINE HYDROCHLORIDE 15 MILLIGRAM(S): 2.5 TABLET ORAL at 06:37

## 2018-05-27 RX ADMIN — MIDODRINE HYDROCHLORIDE 15 MILLIGRAM(S): 2.5 TABLET ORAL at 14:00

## 2018-05-27 RX ADMIN — Medication 1 APPLICATION(S): at 06:40

## 2018-05-27 RX ADMIN — ESCITALOPRAM OXALATE 5 MILLIGRAM(S): 10 TABLET, FILM COATED ORAL at 12:43

## 2018-05-27 RX ADMIN — PIPERACILLIN AND TAZOBACTAM 200 GRAM(S): 4; .5 INJECTION, POWDER, LYOPHILIZED, FOR SOLUTION INTRAVENOUS at 17:06

## 2018-05-27 RX ADMIN — INSULIN HUMAN 4: 100 INJECTION, SOLUTION SUBCUTANEOUS at 12:47

## 2018-05-27 RX ADMIN — MIDODRINE HYDROCHLORIDE 15 MILLIGRAM(S): 2.5 TABLET ORAL at 23:21

## 2018-05-27 RX ADMIN — CALCITRIOL 0.25 MICROGRAM(S): 0.5 CAPSULE ORAL at 12:43

## 2018-05-27 RX ADMIN — PIPERACILLIN AND TAZOBACTAM 200 GRAM(S): 4; .5 INJECTION, POWDER, LYOPHILIZED, FOR SOLUTION INTRAVENOUS at 00:45

## 2018-05-27 RX ADMIN — PIPERACILLIN AND TAZOBACTAM 200 GRAM(S): 4; .5 INJECTION, POWDER, LYOPHILIZED, FOR SOLUTION INTRAVENOUS at 09:32

## 2018-05-27 RX ADMIN — LEVETIRACETAM 500 MILLIGRAM(S): 250 TABLET, FILM COATED ORAL at 17:05

## 2018-05-27 RX ADMIN — Medication 5 MILLIGRAM(S): at 06:37

## 2018-05-27 RX ADMIN — INSULIN HUMAN 6: 100 INJECTION, SOLUTION SUBCUTANEOUS at 06:51

## 2018-05-27 RX ADMIN — Medication 81 MILLIGRAM(S): at 12:43

## 2018-05-27 RX ADMIN — MODAFINIL 100 MILLIGRAM(S): 200 TABLET ORAL at 12:44

## 2018-05-27 RX ADMIN — WARFARIN SODIUM 2.5 MILLIGRAM(S): 2.5 TABLET ORAL at 23:21

## 2018-05-27 RX ADMIN — Medication 5 MILLIGRAM(S): at 17:06

## 2018-05-27 RX ADMIN — Medication 5 MILLILITER(S): at 06:38

## 2018-05-27 RX ADMIN — INSULIN HUMAN 2: 100 INJECTION, SOLUTION SUBCUTANEOUS at 00:03

## 2018-05-27 RX ADMIN — INSULIN HUMAN 8 UNIT(S): 100 INJECTION, SOLUTION SUBCUTANEOUS at 00:03

## 2018-05-27 RX ADMIN — Medication 15 MILLIGRAM(S): at 09:31

## 2018-05-27 NOTE — PROGRESS NOTE ADULT - PROBLEM SELECTOR PLAN 10
VTE: Coumadin dosed nightly, with INR daily checks, goal 2-3  GI PPx: PPI  DNR- Made DNR, family wants escalation of care, pressors if needed.  F: No IVF in setting of HD  E: Replete electrolytes with caution in setting of HD  N: Jevity 1.5 goal rate 62cc/hr, per updated nutrition recs 5/21  Dispo: SD to [facility], likely chronic vent facility + HD, pending SW placement. Dispo plans and SD plans discussed with daughter Cristal

## 2018-05-27 NOTE — PROGRESS NOTE ADULT - PROBLEM SELECTOR PLAN 1
Acute respiratory failure in setting of septic and cardiogenic shock, with trach placed on 4/5/18 for persistent, now chronic respiratory failure. CXR showing effusions. On daily HD.   - continues to tolerate HD w/o albumin (cannot go to LTAC if req. albumin)  - intermittent tachypnea likely a component of pain, dilaudid 0.25mg IV PRN  - intermittent fevers but w/o increase in secretions c/f pseudomonas/aspiration? Started on Zosyn 5/24-->

## 2018-05-27 NOTE — PROGRESS NOTE ADULT - PROBLEM SELECTOR PLAN 1
Acute kidney injury due to ischemic ATN requiring ongoing HD treatment via Right chest wall tunnel HD catheter.  Patient was last dialyzed 5/26 with UF of 1.5 L UF  Volume status - acceptable   Next HD on 5/29  Electrolytes acceptable   - hypophosphatemia - please consider replacing it sodium phosphate - 30 mmol  Low K/Low phos/renal diet

## 2018-05-27 NOTE — PROGRESS NOTE ADULT - ASSESSMENT
Patient is a 63 year old male with a history of HFrEF 10-15% due to ischemic cardiomyopathy, s/p AICD, ?atrial fibrillation, hypertension, diabetes mellitus type 2, gout, and CKD for whom nephrology was called for GILMER from ATN requiring hemodialysis. Last HD done on 5.25 - 1.5 liters removed. No need for HD today

## 2018-05-27 NOTE — PROGRESS NOTE ADULT - SUBJECTIVE AND OBJECTIVE BOX
INTERVAL HPI/OVERNIGHT EVENTS: none     SUBJECTIVE: Patient seen and examined at bedside. looks much improved, follows commands, gauze in mouth 2/2 broken tooth     OBJECTIVE:    VITAL SIGNS:  ICU Vital Signs Last 24 Hrs  T(C): 36.7 (27 May 2018 05:24), Max: 37.2 (26 May 2018 16:12)  T(F): 98 (27 May 2018 05:24), Max: 99 (26 May 2018 16:12)  HR: 97 (27 May 2018 05:24) (89 - 113)  BP: 95/62 (27 May 2018 05:24) (81/60 - 116/89)  BP(mean): --  ABP: --  ABP(mean): --  RR: 21 (27 May 2018 05:24) (21 - 30)  SpO2: 98% (27 May 2018 05:24) (98% - 100%)    Mode: AC/ CMV (Assist Control/ Continuous Mandatory Ventilation), RR (machine): 10, TV (machine): 500, FiO2: 40, PEEP: 5, ITime: 0.85, MAP: 9, PIP: 25    05-26 @ 07:01  -  05-27 @ 07:00  --------------------------------------------------------  IN: 720 mL / OUT: 1500 mL / NET: -780 mL      CAPILLARY BLOOD GLUCOSE      POCT Blood Glucose.: 296 mg/dL (27 May 2018 08:56)      PHYSICAL EXAM:    General:on ACVC, comfortable, NAD, sleeping   HEENT: NC/AT; PERRL, anicteric sclera; MMM  Neck: +trach; no secretions  Cardiovascular: +S1/S2; regular and no tachycardia  Respiratory: diminished breath sounds b/l; diffuse b/l crackles; no rhonchi   Gastrointestinal: distended and firm but non-tender and bowel sounds present; PEG in place, no drainage around site, feeds running; no TTP, BSx4  Extremities: WWP; 2+ pitting edema b/l; chronic venous stasis changes bilateral LE, bandage round L ankle and leg.  Vascular: palp peripheral pulses b/l  Neurological:  Alert and awake. follows commands, dystonic reaction present   Skin: Chronic venous stasis changes b/l LE with dressings applied by wound care, Stage 4 sacral decub present     MEDICATIONS:  MEDICATIONS  (STANDING):  acetylcysteine 20% Inhalation 5 milliLiter(s) Inhalation two times a day  aspirin  chewable 81 milliGRAM(s) Oral daily  atorvastatin 80 milliGRAM(s) Oral at bedtime  calcitriol  Solution 0.25 MICROGram(s) Oral daily  collagenase Ointment 1 Application(s) Topical daily  Dakins Solution - Full Strength 1 Application(s) Topical two times a day  dextrose 5%. 1000 milliLiter(s) (50 mL/Hr) IV Continuous <Continuous>  dextrose 50% Injectable 12.5 Gram(s) IV Push once  dextrose 50% Injectable 25 Gram(s) IV Push once  dextrose 50% Injectable 25 Gram(s) IV Push once  escitalopram 5 milliGRAM(s) Oral daily  folic acid 1 milliGRAM(s) Oral daily  hydrocortisone 15 milliGRAM(s) Oral every 12 hours  insulin regular  human corrective regimen sliding scale   SubCutaneous every 6 hours  insulin regular  human recombinant 8 Unit(s) SubCutaneous every 6 hours  levETIRAcetam  Solution 500 milliGRAM(s) Oral every 24 hours  metoclopramide 5 milliGRAM(s) Oral every 12 hours  midodrine 15 milliGRAM(s) Oral every 8 hours  modafinil 100 milliGRAM(s) Oral daily  multivitamin 1 Tablet(s) Oral daily  pantoprazole   Suspension 40 milliGRAM(s) Oral daily  piperacillin/tazobactam IVPB. 2.25 Gram(s) IV Intermittent every 8 hours  warfarin 2.5 milliGRAM(s) Oral once    MEDICATIONS  (PRN):  acetaminophen    Suspension. 650 milliGRAM(s) Oral every 6 hours PRN Moderate Pain (4 - 6)  acetaminophen   Tablet 650 milliGRAM(s) Oral every 6 hours PRN For Temp greater than 38 C (100.4 F)  dextrose Gel 1 Dose(s) Oral once PRN Blood Glucose LESS THAN 70 milliGRAM(s)/deciliter  glucagon  Injectable 1 milliGRAM(s) IntraMuscular once PRN Glucose LESS THAN 70 milligrams/deciliter      ALLERGIES:  Allergies    No Known Allergies    Intolerances        LABS:                        8.6    16.9  )-----------( 166      ( 27 May 2018 08:28 )             29.1     05-27    136  |  94<L>  |  30<H>  ----------------------------<  314<H>  4.5   |  25  |  2.25<H>    Ca    8.5      27 May 2018 08:28  Phos  1.5     05-27  Mg     2.0     05-27      PT/INR - ( 27 May 2018 08:28 )   PT: 26.6 sec;   INR: 2.35          PTT - ( 27 May 2018 08:28 )  PTT:40.0 sec

## 2018-05-27 NOTE — PROGRESS NOTE ADULT - SUBJECTIVE AND OBJECTIVE BOX
Patient seen in his bed in the morning. Patient lying in bed in no acute distress, inconsistently follows some commands,  on ventilatory support. Last HD on 5/26 - 1.5 liters removed     The patient with prolog hospital stay, started on HD for not recovering renal function. 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     acetaminophen    Suspension. 650 milliGRAM(s) every 6 hours PRN  acetaminophen   Tablet 650 milliGRAM(s) every 6 hours PRN  acetylcysteine 20% Inhalation 5 milliLiter(s) two times a day  aspirin  chewable 81 milliGRAM(s) daily  atorvastatin 80 milliGRAM(s) at bedtime  calcitriol  Solution 0.25 MICROGram(s) daily  collagenase Ointment 1 Application(s) daily  Dakins Solution - Full Strength 1 Application(s) two times a day  dextrose 5%. 1000 milliLiter(s) <Continuous>  dextrose 50% Injectable 12.5 Gram(s) once  dextrose 50% Injectable 25 Gram(s) once  dextrose 50% Injectable 25 Gram(s) once  dextrose Gel 1 Dose(s) once PRN  escitalopram 5 milliGRAM(s) daily  folic acid 1 milliGRAM(s) daily  glucagon  Injectable 1 milliGRAM(s) once PRN  hydrocortisone 15 milliGRAM(s) every 12 hours  insulin regular  human corrective regimen sliding scale   every 6 hours  insulin regular  human recombinant 8 Unit(s) every 6 hours  levETIRAcetam  Solution 500 milliGRAM(s) every 24 hours  metoclopramide 5 milliGRAM(s) every 12 hours  midodrine 15 milliGRAM(s) every 8 hours  modafinil 100 milliGRAM(s) daily  multivitamin 1 Tablet(s) daily  pantoprazole   Suspension 40 milliGRAM(s) daily  piperacillin/tazobactam IVPB. 2.25 Gram(s) every 8 hours      Allergies    No Known Allergies    Intolerances        T(C): , Max: 37.2 (05-26-18 @ 16:12)  T(F): , Max: 99 (05-26-18 @ 16:12)  HR: 97 (05-27-18 @ 05:24)  BP: 95/62 (05-27-18 @ 05:24)  BP(mean): --  RR: 21 (05-27-18 @ 05:24)  SpO2: 98% (05-27-18 @ 05:24)  Wt(kg): --    05-26 @ 07:01  -  05-27 @ 07:00  --------------------------------------------------------  IN:    Jevity: 620 mL    Solution: 100 mL  Total IN: 720 mL    OUT:    Other: 1500 mL  Total OUT: 1500 mL    Total NET: -780 mL      physical exam:   on Trach collar - on ventilator   s/p tracheostomy   Normal air entry into the lungs, decreased breath sounds in the bases   RRR, normal s1/s2, no murmurs, rubs or gallops   Abdomen - soft, not tender, not distended   Extremities: no edema  Has a perm cath on the right of his chest         LABS:                        8.6    16.9  )-----------( 166      ( 27 May 2018 08:28 )             29.1     05-26    134<L>  |  91<L>  |  40<H>  ----------------------------<  144<H>  4.1   |  26  |  2.83<H>    Ca    8.9      26 May 2018 11:38  Phos  2.1     05-26  Mg     2.0     05-26        PT/INR - ( 27 May 2018 08:28 )   PT: 26.6 sec;   INR: 2.35          PTT - ( 27 May 2018 08:28 )  PTT:40.0 sec          RADIOLOGY & ADDITIONAL STUDIES:

## 2018-05-27 NOTE — PROGRESS NOTE ADULT - PROBLEM SELECTOR PLAN 8
As noted above. New fevers + elevated WBCs, started on zosyn 5/24-->   RESOLVING     #Fungemia  RESOLVED. Noted to have Candida Tropicalis growing in blood cultures and completed fluconazole course.

## 2018-05-27 NOTE — PROGRESS NOTE ADULT - PROBLEM SELECTOR PLAN 5
Failure likely 2/2 to ischemic ATN with hypoperfusion in setting of shock. Baseline unknown but presenting Cr 3.93 with associated metabolic derangements. Started on HD during course with renal following. Permacath replaced multiple times over course for bacteremia and fungemia. Has R sided Permacath.   - HD per nephro next on 5/29

## 2018-05-28 DIAGNOSIS — L89.95 PRESSURE ULCER OF UNSPECIFIED SITE, UNSTAGEABLE: ICD-10-CM

## 2018-05-28 LAB
-  AMPICILLIN: SIGNIFICANT CHANGE UP
-  VANCOMYCIN: SIGNIFICANT CHANGE UP
-  VANCOMYCIN: SIGNIFICANT CHANGE UP
ANION GAP SERPL CALC-SCNC: 18 MMOL/L — HIGH (ref 5–17)
APTT BLD: 40.4 SEC — HIGH (ref 27.5–37.4)
BUN SERPL-MCNC: 42 MG/DL — HIGH (ref 7–23)
CALCIUM SERPL-MCNC: 9.1 MG/DL — SIGNIFICANT CHANGE UP (ref 8.4–10.5)
CHLORIDE SERPL-SCNC: 95 MMOL/L — LOW (ref 96–108)
CO2 SERPL-SCNC: 25 MMOL/L — SIGNIFICANT CHANGE UP (ref 22–31)
CREAT SERPL-MCNC: 3.05 MG/DL — HIGH (ref 0.5–1.3)
GLUCOSE BLDC GLUCOMTR-MCNC: 219 MG/DL — HIGH (ref 70–99)
GLUCOSE BLDC GLUCOMTR-MCNC: 227 MG/DL — HIGH (ref 70–99)
GLUCOSE BLDC GLUCOMTR-MCNC: 230 MG/DL — HIGH (ref 70–99)
GLUCOSE BLDC GLUCOMTR-MCNC: 231 MG/DL — HIGH (ref 70–99)
GLUCOSE BLDC GLUCOMTR-MCNC: 245 MG/DL — HIGH (ref 70–99)
GLUCOSE SERPL-MCNC: 229 MG/DL — HIGH (ref 70–99)
HCT VFR BLD CALC: 30.3 % — LOW (ref 39–50)
HGB BLD-MCNC: 9.2 G/DL — LOW (ref 13–17)
INR BLD: 2.25 — HIGH (ref 0.88–1.16)
MAGNESIUM SERPL-MCNC: 2.2 MG/DL — SIGNIFICANT CHANGE UP (ref 1.6–2.6)
MCHC RBC-ENTMCNC: 26.8 PG — LOW (ref 27–34)
MCHC RBC-ENTMCNC: 30.4 G/DL — LOW (ref 32–36)
MCV RBC AUTO: 88.3 FL — SIGNIFICANT CHANGE UP (ref 80–100)
METHOD TYPE: SIGNIFICANT CHANGE UP
METHOD TYPE: SIGNIFICANT CHANGE UP
PHOSPHATE SERPL-MCNC: 3 MG/DL — SIGNIFICANT CHANGE UP (ref 2.5–4.5)
PLATELET # BLD AUTO: 175 K/UL — SIGNIFICANT CHANGE UP (ref 150–400)
POTASSIUM SERPL-MCNC: 5 MMOL/L — SIGNIFICANT CHANGE UP (ref 3.5–5.3)
POTASSIUM SERPL-SCNC: 5 MMOL/L — SIGNIFICANT CHANGE UP (ref 3.5–5.3)
PROTHROM AB SERPL-ACNC: 25.4 SEC — HIGH (ref 9.8–12.7)
RBC # BLD: 3.43 M/UL — LOW (ref 4.2–5.8)
RBC # FLD: 17.6 % — HIGH (ref 10.3–16.9)
SODIUM SERPL-SCNC: 138 MMOL/L — SIGNIFICANT CHANGE UP (ref 135–145)
WBC # BLD: 18 K/UL — HIGH (ref 3.8–10.5)
WBC # FLD AUTO: 18 K/UL — HIGH (ref 3.8–10.5)

## 2018-05-28 PROCEDURE — 99233 SBSQ HOSP IP/OBS HIGH 50: CPT | Mod: GC

## 2018-05-28 PROCEDURE — 99232 SBSQ HOSP IP/OBS MODERATE 35: CPT

## 2018-05-28 PROCEDURE — G0365: CPT | Mod: 26

## 2018-05-28 RX ORDER — INSULIN HUMAN 100 [IU]/ML
12 INJECTION, SOLUTION SUBCUTANEOUS EVERY 6 HOURS
Qty: 0 | Refills: 0 | Status: DISCONTINUED | OUTPATIENT
Start: 2018-05-28 | End: 2018-05-30

## 2018-05-28 RX ORDER — WARFARIN SODIUM 2.5 MG/1
3 TABLET ORAL ONCE
Qty: 0 | Refills: 0 | Status: COMPLETED | OUTPATIENT
Start: 2018-05-28 | End: 2018-05-28

## 2018-05-28 RX ORDER — INSULIN HUMAN 100 [IU]/ML
10 INJECTION, SOLUTION SUBCUTANEOUS EVERY 6 HOURS
Qty: 0 | Refills: 0 | Status: DISCONTINUED | OUTPATIENT
Start: 2018-05-28 | End: 2018-05-28

## 2018-05-28 RX ADMIN — Medication 1 APPLICATION(S): at 13:41

## 2018-05-28 RX ADMIN — MIDODRINE HYDROCHLORIDE 15 MILLIGRAM(S): 2.5 TABLET ORAL at 16:17

## 2018-05-28 RX ADMIN — MIDODRINE HYDROCHLORIDE 15 MILLIGRAM(S): 2.5 TABLET ORAL at 06:36

## 2018-05-28 RX ADMIN — Medication 5 MILLILITER(S): at 16:54

## 2018-05-28 RX ADMIN — CALCITRIOL 0.25 MICROGRAM(S): 0.5 CAPSULE ORAL at 13:41

## 2018-05-28 RX ADMIN — ATORVASTATIN CALCIUM 80 MILLIGRAM(S): 80 TABLET, FILM COATED ORAL at 22:00

## 2018-05-28 RX ADMIN — Medication 5 MILLIGRAM(S): at 06:36

## 2018-05-28 RX ADMIN — INSULIN HUMAN 4: 100 INJECTION, SOLUTION SUBCUTANEOUS at 12:47

## 2018-05-28 RX ADMIN — PIPERACILLIN AND TAZOBACTAM 200 GRAM(S): 4; .5 INJECTION, POWDER, LYOPHILIZED, FOR SOLUTION INTRAVENOUS at 16:53

## 2018-05-28 RX ADMIN — PIPERACILLIN AND TAZOBACTAM 200 GRAM(S): 4; .5 INJECTION, POWDER, LYOPHILIZED, FOR SOLUTION INTRAVENOUS at 09:28

## 2018-05-28 RX ADMIN — INSULIN HUMAN 10 UNIT(S): 100 INJECTION, SOLUTION SUBCUTANEOUS at 17:52

## 2018-05-28 RX ADMIN — Medication 15 MILLIGRAM(S): at 22:01

## 2018-05-28 RX ADMIN — WARFARIN SODIUM 3 MILLIGRAM(S): 2.5 TABLET ORAL at 22:00

## 2018-05-28 RX ADMIN — Medication 15 MILLIGRAM(S): at 09:28

## 2018-05-28 RX ADMIN — INSULIN HUMAN 4: 100 INJECTION, SOLUTION SUBCUTANEOUS at 17:53

## 2018-05-28 RX ADMIN — LEVETIRACETAM 500 MILLIGRAM(S): 250 TABLET, FILM COATED ORAL at 13:42

## 2018-05-28 RX ADMIN — ESCITALOPRAM OXALATE 5 MILLIGRAM(S): 10 TABLET, FILM COATED ORAL at 13:39

## 2018-05-28 RX ADMIN — INSULIN HUMAN 8 UNIT(S): 100 INJECTION, SOLUTION SUBCUTANEOUS at 12:35

## 2018-05-28 RX ADMIN — PANTOPRAZOLE SODIUM 40 MILLIGRAM(S): 20 TABLET, DELAYED RELEASE ORAL at 13:39

## 2018-05-28 RX ADMIN — Medication 1 MILLIGRAM(S): at 13:39

## 2018-05-28 RX ADMIN — INSULIN HUMAN 8 UNIT(S): 100 INJECTION, SOLUTION SUBCUTANEOUS at 00:47

## 2018-05-28 RX ADMIN — Medication 1 TABLET(S): at 13:40

## 2018-05-28 RX ADMIN — Medication 5 MILLILITER(S): at 06:35

## 2018-05-28 RX ADMIN — MODAFINIL 100 MILLIGRAM(S): 200 TABLET ORAL at 13:40

## 2018-05-28 RX ADMIN — Medication 1 APPLICATION(S): at 17:53

## 2018-05-28 RX ADMIN — Medication 81 MILLIGRAM(S): at 13:38

## 2018-05-28 RX ADMIN — MIDODRINE HYDROCHLORIDE 15 MILLIGRAM(S): 2.5 TABLET ORAL at 22:02

## 2018-05-28 RX ADMIN — INSULIN HUMAN 8 UNIT(S): 100 INJECTION, SOLUTION SUBCUTANEOUS at 06:36

## 2018-05-28 RX ADMIN — INSULIN HUMAN 4: 100 INJECTION, SOLUTION SUBCUTANEOUS at 06:36

## 2018-05-28 RX ADMIN — Medication 5 MILLIGRAM(S): at 16:54

## 2018-05-28 RX ADMIN — INSULIN HUMAN 2: 100 INJECTION, SOLUTION SUBCUTANEOUS at 00:47

## 2018-05-28 RX ADMIN — Medication 1 APPLICATION(S): at 06:37

## 2018-05-28 RX ADMIN — PIPERACILLIN AND TAZOBACTAM 200 GRAM(S): 4; .5 INJECTION, POWDER, LYOPHILIZED, FOR SOLUTION INTRAVENOUS at 00:47

## 2018-05-28 NOTE — PROGRESS NOTE ADULT - ASSESSMENT
IMPRESSION:  Fevers and leukocytosis - improving.  Presume a skin source.  Wound culture with multiple different bacteria; most of which are part of GI blu.  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  No MRSA isolated    Recommend:  1.  Agree with stopping vancomycin  2.  Can continue zosyn for 7 days total then stop    ID will sign off.  Reconsult as needed

## 2018-05-28 NOTE — PROGRESS NOTE ADULT - PROBLEM SELECTOR PLAN 4
Treated for both septic as well as cardiogenic shock requiring pressors and inotropes. Subsequently weaned off, now on midodrine 15 mg TID. Has intermittently used albumin to tolerate dialysis.

## 2018-05-28 NOTE — PROGRESS NOTE ADULT - PROBLEM SELECTOR PLAN 1
Acute kidney injury due to ischemic ATN requiring ongoing HD treatment via Right chest wall tunnel HD catheter.  Patient was last dialyzed 5/26 with UF of 1.5 L UF  Volume status stable with persistent right side pleural efffusion. K 5.0  Will defer HD treatment today. Next HD anticipated on 5/29.  Low k/Low phos/renal diet.

## 2018-05-28 NOTE — PROGRESS NOTE ADULT - SUBJECTIVE AND OBJECTIVE BOX
Patient is a 63y old  Male who presents with a chief complaint of     INTERVAL HPI/OVERNIGHT EVENTS: No acute events O/N.     Review of Systems: 12 point review of systems otherwise negative      MEDICATIONS  (STANDING):  acetylcysteine 20% Inhalation 5 milliLiter(s) Inhalation two times a day  aspirin  chewable 81 milliGRAM(s) Oral daily  atorvastatin 80 milliGRAM(s) Oral at bedtime  calcitriol  Solution 0.25 MICROGram(s) Oral daily  collagenase Ointment 1 Application(s) Topical daily  Dakins Solution - Full Strength 1 Application(s) Topical two times a day  dextrose 5%. 1000 milliLiter(s) (50 mL/Hr) IV Continuous <Continuous>  dextrose 50% Injectable 12.5 Gram(s) IV Push once  dextrose 50% Injectable 25 Gram(s) IV Push once  dextrose 50% Injectable 25 Gram(s) IV Push once  escitalopram 5 milliGRAM(s) Oral daily  folic acid 1 milliGRAM(s) Oral daily  hydrocortisone 15 milliGRAM(s) Oral every 12 hours  insulin regular  human corrective regimen sliding scale   SubCutaneous every 6 hours  insulin regular  human recombinant 10 Unit(s) SubCutaneous every 6 hours  levETIRAcetam  Solution 500 milliGRAM(s) Oral every 24 hours  metoclopramide 5 milliGRAM(s) Oral every 12 hours  midodrine 15 milliGRAM(s) Oral every 8 hours  modafinil 100 milliGRAM(s) Oral daily  multivitamin 1 Tablet(s) Oral daily  pantoprazole   Suspension 40 milliGRAM(s) Oral daily  piperacillin/tazobactam IVPB. 2.25 Gram(s) IV Intermittent every 8 hours  warfarin 3 milliGRAM(s) Oral once    MEDICATIONS  (PRN):  acetaminophen    Suspension. 650 milliGRAM(s) Oral every 6 hours PRN Moderate Pain (4 - 6)  dextrose Gel 1 Dose(s) Oral once PRN Blood Glucose LESS THAN 70 milliGRAM(s)/deciliter  glucagon  Injectable 1 milliGRAM(s) IntraMuscular once PRN Glucose LESS THAN 70 milligrams/deciliter      Allergies    No Known Allergies    Intolerances          Vital Signs Last 24 Hrs  T(C): 36.8 (28 May 2018 16:52), Max: 37.3 (27 May 2018 21:14)  T(F): 98.2 (28 May 2018 16:52), Max: 99.2 (27 May 2018 21:14)  HR: 86 (28 May 2018 16:52) (86 - 108)  BP: 94/67 (28 May 2018 16:52) (94/66 - 99/63)  BP(mean): --  RR: 20 (28 May 2018 16:52) (11 - 20)  SpO2: 100% (28 May 2018 16:52) (96% - 100%)  CAPILLARY BLOOD GLUCOSE      POCT Blood Glucose.: 230 mg/dL (28 May 2018 11:57)  POCT Blood Glucose.: 219 mg/dL (28 May 2018 06:15)  POCT Blood Glucose.: 177 mg/dL (27 May 2018 23:54)  POCT Blood Glucose.: 115 mg/dL (27 May 2018 17:30)      05-27 @ 07:01  -  05-28 @ 07:00  --------------------------------------------------------  IN: 844 mL / OUT: 0 mL / NET: 844 mL    05-28 @ 07:01 - 05-28 @ 16:57  --------------------------------------------------------  IN: 620 mL / OUT: 0 mL / NET: 620 mL        Physical Exam:    General:on ACVC, comfortable, NAD  HEENT: NC/AT; PERRL, anicteric sclera; MMM  Neck: +trach; no secretions  Cardiovascular: +S1/S2; regular and no tachycardia  Respiratory: diminished breath sounds b/l; diffuse b/l crackles; no rhonchi   Gastrointestinal: distended and firm but non-tender and bowel sounds present; PEG in place, no drainage around site, feeds running; no TTP, BSx4  Extremities: WWP; 2+ pitting edema b/l; chronic venous stasis changes bilateral LE, bandage round L ankle and leg.  Vascular: palp peripheral pulses b/l  Neurological:  Alert and awake. follows commands, dystonic reaction present   Skin: Chronic venous stasis changes b/l LE with dressings applied by wound care, Stage 4 sacral decub present       LABS:                        9.2    18.0  )-----------( 175      ( 28 May 2018 07:10 )             30.3     05-28    138  |  95<L>  |  42<H>  ----------------------------<  229<H>  5.0   |  25  |  3.05<H>    Ca    9.1      28 May 2018 07:10  Phos  3.0     05-28  Mg     2.2     05-28      PT/INR - ( 28 May 2018 07:10 )   PT: 25.4 sec;   INR: 2.25          PTT - ( 28 May 2018 07:10 )  PTT:40.4 sec        RADIOLOGY & ADDITIONAL TESTS:    ---------------------------------------------------------------------------  I personally reviewed: [  ]EKG   [  ]CXR    [  ] CT    [  ]Other  ---------------------------------------------------------------------------  PLEASE CHECK WHEN PRESENT:     [  ]Heart Failure     [  ] Acute     [  ] Acute on Chronic     [  ] Chronic  -------------------------------------------------------------------     [  ]Diastolic [HFpEF]     [  ]Systolic [HFrEF]     [  ]Combined [HFpEF & HFrEF]     [  ]Other:  -------------------------------------------------------------------  [  ]GILMER     [  ]ATN     [  ]Reneal Medullary Necrosis     [  ]Renal Cortical Necrosis     [  ]Other Pathological Lesions:    [  ]CKD 1  [  ]CKD 2  [  ]CKD 3  [  ]CKD 4  [  ]CKD 5  [  ]Other  -------------------------------------------------------------------  [  ]Other/Unspecified:    --------------------------------------------------------------------    Abdominal Nutritional Status  [  ]Malnutrition: See Nutrition Note  [  ]Cachexia  [  ]Other:   [  ]Supplement Ordered:  [  ]Morbid Obesity (BMI >=40]

## 2018-05-28 NOTE — PROGRESS NOTE ADULT - SUBJECTIVE AND OBJECTIVE BOX
Patient is a 63y Male seen and evaluated at bedside. Patient lying in bed in no acute distress remains on ventilatory support. Last HD on 5/26 with 1.5L UF.     acetaminophen    Suspension. 650 every 6 hours PRN  acetylcysteine 20% Inhalation 5 two times a day  aspirin  chewable 81 daily  atorvastatin 80 at bedtime  calcitriol  Solution 0.25 daily  collagenase Ointment 1 daily  Dakins Solution - Full Strength 1 two times a day  dextrose 5%. 1000 <Continuous>  dextrose 50% Injectable 12.5 once  dextrose 50% Injectable 25 once  dextrose 50% Injectable 25 once  dextrose Gel 1 once PRN  escitalopram 5 daily  folic acid 1 daily  glucagon  Injectable 1 once PRN  hydrocortisone 15 every 12 hours  insulin regular  human corrective regimen sliding scale  every 6 hours  insulin regular  human recombinant 8 every 6 hours  levETIRAcetam  Solution 500 every 24 hours  metoclopramide 5 every 12 hours  midodrine 15 every 8 hours  modafinil 100 daily  multivitamin 1 daily  pantoprazole   Suspension 40 daily  piperacillin/tazobactam IVPB. 2.25 every 8 hours  warfarin 3 once      Allergies    No Known Allergies    Intolerances        T(C): , Max: 37.3 (05-27-18 @ 21:14)  T(F): , Max: 99.2 (05-27-18 @ 21:14)  HR: 92 (05-28-18 @ 08:17)  BP: 99/63 (05-28-18 @ 08:17)  BP(mean): --  RR: 20 (05-28-18 @ 08:17)  SpO2: 100% (05-28-18 @ 08:17)  Wt(kg): --    05-27 @ 07:01  -  05-28 @ 07:00  --------------------------------------------------------  IN: 844 mL / OUT: 0 mL / NET: 844 mL      Review of Systems:  Limited. Patient off ventilatory support ventilatory support    PHYSICAL EXAM:  GENERAL: Ill appearing, awake but confused, disoriented in no acute distress at present  HEAD:  Atraumatic, Normocephalic,   EYES: Bilateral conjuctival and scleral pallor+nt  Oral cavity: Oral mucosa moist and pale  NECK: Neck supple, Mild JVP, tracheostomy present.  CHEST/LUNG:  Bilateral decreased breath sounds, Bibasilar rales and crepitatiosn+nt, Right>left, no wheezing  HEART: Regular rate and rhythm. HOMER II/VI at LPSB, No gallop, no rub   ABDOMEN: Soft, Nontender, nondistended, PEG tube present. BS+nt, No flank tenderness.   EXTREMITIES: Bilateral thigh and leg edema++nt, No clubbing, cyanosis  Neurology: AAOx1-2, awake and answers few verbal commands  SKIN: No rashes or lesions      ACCESS: Right chest wall tunnel HD catheter.    LABS:                        9.2    18.0  )-----------( 175      ( 28 May 2018 07:10 )             30.3     05-28    138  |  95<L>  |  42<H>  ----------------------------<  229<H>  5.0   |  25  |  3.05<H>    Ca    9.1      28 May 2018 07:10  Phos  3.0     05-28  Mg     2.2     05-28        PT/INR - ( 28 May 2018 07:10 )   PT: 25.4 sec;   INR: 2.25          PTT - ( 28 May 2018 07:10 )  PTT:40.4 sec          RADIOLOGY & ADDITIONAL STUDIES:

## 2018-05-28 NOTE — PROGRESS NOTE ADULT - SUBJECTIVE AND OBJECTIVE BOX
INTERVAL HPI/OVERNIGHT EVENTS:    Patient seen and examined.  No events.  Afebrile.  Vanco stopped 5/27    ROS:     unable to obtain        ANTIBIOTICS/RELEVANT:    MEDICATIONS  (STANDING):  acetylcysteine 20% Inhalation 5 milliLiter(s) Inhalation two times a day  aspirin  chewable 81 milliGRAM(s) Oral daily  atorvastatin 80 milliGRAM(s) Oral at bedtime  calcitriol  Solution 0.25 MICROGram(s) Oral daily  collagenase Ointment 1 Application(s) Topical daily  Dakins Solution - Full Strength 1 Application(s) Topical two times a day  dextrose 5%. 1000 milliLiter(s) (50 mL/Hr) IV Continuous <Continuous>  dextrose 50% Injectable 12.5 Gram(s) IV Push once  dextrose 50% Injectable 25 Gram(s) IV Push once  dextrose 50% Injectable 25 Gram(s) IV Push once  escitalopram 5 milliGRAM(s) Oral daily  folic acid 1 milliGRAM(s) Oral daily  hydrocortisone 15 milliGRAM(s) Oral every 12 hours  insulin regular  human corrective regimen sliding scale   SubCutaneous every 6 hours  insulin regular  human recombinant 10 Unit(s) SubCutaneous every 6 hours  levETIRAcetam  Solution 500 milliGRAM(s) Oral every 24 hours  metoclopramide 5 milliGRAM(s) Oral every 12 hours  midodrine 15 milliGRAM(s) Oral every 8 hours  modafinil 100 milliGRAM(s) Oral daily  multivitamin 1 Tablet(s) Oral daily  pantoprazole   Suspension 40 milliGRAM(s) Oral daily  piperacillin/tazobactam IVPB. 2.25 Gram(s) IV Intermittent every 8 hours  warfarin 3 milliGRAM(s) Oral once    MEDICATIONS  (PRN):  acetaminophen    Suspension. 650 milliGRAM(s) Oral every 6 hours PRN Moderate Pain (4 - 6)  dextrose Gel 1 Dose(s) Oral once PRN Blood Glucose LESS THAN 70 milliGRAM(s)/deciliter  glucagon  Injectable 1 milliGRAM(s) IntraMuscular once PRN Glucose LESS THAN 70 milligrams/deciliter        Vital Signs Last 24 Hrs  T(C): 37.1 (28 May 2018 08:17), Max: 37.3 (27 May 2018 21:14)  T(F): 98.7 (28 May 2018 08:17), Max: 99.2 (27 May 2018 21:14)  HR: 92 (28 May 2018 08:17) (92 - 108)  BP: 99/63 (28 May 2018 08:17) (94/66 - 99/63)  BP(mean): --  RR: 20 (28 May 2018 08:17) (11 - 20)  SpO2: 99% (28 May 2018 12:42) (96% - 100%)    PHYSICAL EXAM:  Constitutional:  chronically ill appearing, no distress  Eyes:  no icterus   Ear/Nose/Throat: no sinus tenderness on percussion	  Neck: + trach  Respiratory: CTA morris  Cardiovascular: S1S2 RRR, no murmurs  Gastrointestinal:soft, (+) BS, no HSM  Extremities: left arm with ulceration; no purulence or surrounding erythema noted   Vascular: DP Pulse:	right normal; left normal      LABS:                        9.2    18.0  )-----------( 175      ( 28 May 2018 07:10 )             30.3     05-28    138  |  95<L>  |  42<H>  ----------------------------<  229<H>  5.0   |  25  |  3.05<H>    Ca    9.1      28 May 2018 07:10  Phos  3.0     05-28  Mg     2.2     05-28      PT/INR - ( 28 May 2018 07:10 )   PT: 25.4 sec;   INR: 2.25          PTT - ( 28 May 2018 07:10 )  PTT:40.4 sec      MICROBIOLOGY:    Culture - Other (05.25.18 @ 11:57)    -  Tobramycin: R >8    -  Tobramycin: S <=4    -  Tobramycin: S <=4    -  Trimethoprim/Sulfamethoxazole: R >2/38    -  Trimethoprim/Sulfamethoxazole: S <=2/38    -  Vancomycin: R >16    -  Vancomycin: S 4    -  Ciprofloxacin: S <=1    -  Ciprofloxacin: S <=1    -  Ertapenem: I <=1    -  Gentamicin: R >8    -  Gentamicin: S <=4    -  Gentamicin: S <=4    -  Imipenem: S <=1    -  Imipenem: S <=1    -  Levofloxacin: S <=2    -  Piperacillin/Tazobactam: S <=16    -  Piperacillin/Tazobactam: S <=16    -  Meropenem: S <=1    -  Tigecycline: S <=2    -  Piperacillin/Tazobactam: S <=16    Gram Stain:   Few Gram positive cocci in pairs  Few Gram Negative Rods  Moderate White blood cells    -  Aztreonam: S 8    -  Aztreonam: S <=4    -  Ampicillin: R >16 These ampicillin results predict results for amoxicillin    -  Ampicillin: S <=8 These ampicillin results predict results for amoxicillin    -  Ampicillin: S These ampicillin results predict results for amoxicillin    -  Ampicillin: R >8    -  Ampicillin/Sulbactam: R >16/8    -  Ampicillin/Sulbactam: S <=8/4    -  Ceftazidime: S 4    -  Ceftazidime: S <=1    -  Ciprofloxacin: S <=1    -  Ceftriaxone: R 8 Enterobacter, Citrobacter, and Serratia may develop resistance during prolonged therapy    -  Ceftriaxone: S <=1 Enterobacter, Citrobacter, and Serratia may develop resistance during prolonged therapy    -  Ceftriaxone: S <=1 Enterobacter, Citrobacter, and Serratia may develop resistance during prolonged therapy    -  Cefuroxime: S <=4    -  Cefotaxime: S <=2    -  Cefoxitin: S <=8    -  Cefepime: S <=4    -  Amikacin: S <=16    -  Amikacin: S <=16    -  Cefazolin: R >16    -  Cefazolin: S <=8    Specimen Source: .Other sacral decub    Culture Results:   Numerous Gram Negative Rods  (three types) including:   Numerous Pseudomonas aeruginosa  Moderate Enterococcus faecium (vancomycin resistant) Result called to and  read back bySheldon Anderson RN  05/28/2018 12:05:52 Susceptibility to follow.  More thanthree types of organisms recovered may indicate colonization  and/or contamination.  Please call Microbiology immediately if identification and/or  suceptibility is indicated.    Organism Identification: Escherichia coli  Klebsiella pneumoniae  Pseudomonas aeruginosa  Escherichia coli  Enterococcus faecalis  Enterococcus faecium (vancomycin resistant)    Organism: Klebsiella pneumoniae    Organism: Pseudomonas aeruginosa    Organism: Escherichia coli    Organism: Escherichia coli    Organism: Enterococcus faecium (vancomycin resistant)    Organism: Enterococcus faecalis    Method Type: YONATHAN    Method Type: YONATHAN    Method Type: YONATHAN    Method Type: YONATHAN    Method Type: YONATHAN    Method Type: YONATHAN        RADIOLOGY & ADDITIONAL STUDIES:

## 2018-05-29 LAB
ANION GAP SERPL CALC-SCNC: 15 MMOL/L — SIGNIFICANT CHANGE UP (ref 5–17)
BLD GP AB SCN SERPL QL: NEGATIVE — SIGNIFICANT CHANGE UP
BUN SERPL-MCNC: 54 MG/DL — HIGH (ref 7–23)
CALCIUM SERPL-MCNC: 8.8 MG/DL — SIGNIFICANT CHANGE UP (ref 8.4–10.5)
CHLORIDE SERPL-SCNC: 95 MMOL/L — LOW (ref 96–108)
CO2 SERPL-SCNC: 27 MMOL/L — SIGNIFICANT CHANGE UP (ref 22–31)
CREAT SERPL-MCNC: 3.75 MG/DL — HIGH (ref 0.5–1.3)
CULTURE RESULTS: SIGNIFICANT CHANGE UP
CULTURE RESULTS: SIGNIFICANT CHANGE UP
GLUCOSE BLDC GLUCOMTR-MCNC: 125 MG/DL — HIGH (ref 70–99)
GLUCOSE BLDC GLUCOMTR-MCNC: 195 MG/DL — HIGH (ref 70–99)
GLUCOSE BLDC GLUCOMTR-MCNC: 198 MG/DL — HIGH (ref 70–99)
GLUCOSE BLDC GLUCOMTR-MCNC: 206 MG/DL — HIGH (ref 70–99)
GLUCOSE BLDC GLUCOMTR-MCNC: 228 MG/DL — HIGH (ref 70–99)
GLUCOSE SERPL-MCNC: 205 MG/DL — HIGH (ref 70–99)
HCT VFR BLD CALC: 28.8 % — LOW (ref 39–50)
HGB BLD-MCNC: 8.8 G/DL — LOW (ref 13–17)
INR BLD: 2.52 — HIGH (ref 0.88–1.16)
MCHC RBC-ENTMCNC: 26.5 PG — LOW (ref 27–34)
MCHC RBC-ENTMCNC: 30.6 G/DL — LOW (ref 32–36)
MCV RBC AUTO: 86.7 FL — SIGNIFICANT CHANGE UP (ref 80–100)
PLATELET # BLD AUTO: 170 K/UL — SIGNIFICANT CHANGE UP (ref 150–400)
POTASSIUM SERPL-MCNC: 5 MMOL/L — SIGNIFICANT CHANGE UP (ref 3.5–5.3)
POTASSIUM SERPL-SCNC: 5 MMOL/L — SIGNIFICANT CHANGE UP (ref 3.5–5.3)
PROTHROM AB SERPL-ACNC: 28.5 SEC — HIGH (ref 9.8–12.7)
RBC # BLD: 3.32 M/UL — LOW (ref 4.2–5.8)
RBC # FLD: 17.7 % — HIGH (ref 10.3–16.9)
RH IG SCN BLD-IMP: POSITIVE — SIGNIFICANT CHANGE UP
SODIUM SERPL-SCNC: 137 MMOL/L — SIGNIFICANT CHANGE UP (ref 135–145)
SPECIMEN SOURCE: SIGNIFICANT CHANGE UP
SPECIMEN SOURCE: SIGNIFICANT CHANGE UP
WBC # BLD: 18.1 K/UL — HIGH (ref 3.8–10.5)
WBC # FLD AUTO: 18.1 K/UL — HIGH (ref 3.8–10.5)

## 2018-05-29 PROCEDURE — 90935 HEMODIALYSIS ONE EVALUATION: CPT | Mod: GC

## 2018-05-29 PROCEDURE — 99233 SBSQ HOSP IP/OBS HIGH 50: CPT | Mod: GC

## 2018-05-29 RX ORDER — MIDODRINE HYDROCHLORIDE 2.5 MG/1
10 TABLET ORAL ONCE
Qty: 0 | Refills: 0 | Status: COMPLETED | OUTPATIENT
Start: 2018-05-29 | End: 2018-05-29

## 2018-05-29 RX ORDER — DOXERCALCIFEROL 2.5 UG/1
2 CAPSULE ORAL ONCE
Qty: 0 | Refills: 0 | Status: COMPLETED | OUTPATIENT
Start: 2018-05-29 | End: 2018-05-29

## 2018-05-29 RX ORDER — MIDODRINE HYDROCHLORIDE 2.5 MG/1
10 TABLET ORAL EVERY 8 HOURS
Qty: 0 | Refills: 0 | Status: DISCONTINUED | OUTPATIENT
Start: 2018-05-29 | End: 2018-07-01

## 2018-05-29 RX ORDER — ACETAMINOPHEN 500 MG
650 TABLET ORAL EVERY 8 HOURS
Qty: 0 | Refills: 0 | Status: DISCONTINUED | OUTPATIENT
Start: 2018-05-29 | End: 2018-07-01

## 2018-05-29 RX ORDER — ERYTHROPOIETIN 10000 [IU]/ML
10000 INJECTION, SOLUTION INTRAVENOUS; SUBCUTANEOUS ONCE
Qty: 0 | Refills: 0 | Status: COMPLETED | OUTPATIENT
Start: 2018-05-29 | End: 2018-05-29

## 2018-05-29 RX ORDER — MODAFINIL 200 MG/1
100 TABLET ORAL DAILY
Qty: 0 | Refills: 0 | Status: DISCONTINUED | OUTPATIENT
Start: 2018-05-29 | End: 2018-06-05

## 2018-05-29 RX ORDER — HYDROCORTISONE 20 MG
10 TABLET ORAL EVERY 12 HOURS
Qty: 0 | Refills: 0 | Status: DISCONTINUED | OUTPATIENT
Start: 2018-05-29 | End: 2018-07-01

## 2018-05-29 RX ADMIN — Medication 1 APPLICATION(S): at 18:49

## 2018-05-29 RX ADMIN — DOXERCALCIFEROL 2 MICROGRAM(S): 2.5 CAPSULE ORAL at 11:14

## 2018-05-29 RX ADMIN — MIDODRINE HYDROCHLORIDE 10 MILLIGRAM(S): 2.5 TABLET ORAL at 10:19

## 2018-05-29 RX ADMIN — Medication 1 TABLET(S): at 14:32

## 2018-05-29 RX ADMIN — Medication 650 MILLIGRAM(S): at 22:41

## 2018-05-29 RX ADMIN — INSULIN HUMAN 4: 100 INJECTION, SOLUTION SUBCUTANEOUS at 18:45

## 2018-05-29 RX ADMIN — PIPERACILLIN AND TAZOBACTAM 200 GRAM(S): 4; .5 INJECTION, POWDER, LYOPHILIZED, FOR SOLUTION INTRAVENOUS at 17:12

## 2018-05-29 RX ADMIN — INSULIN HUMAN 4: 100 INJECTION, SOLUTION SUBCUTANEOUS at 06:27

## 2018-05-29 RX ADMIN — ERYTHROPOIETIN 10000 UNIT(S): 10000 INJECTION, SOLUTION INTRAVENOUS; SUBCUTANEOUS at 11:13

## 2018-05-29 RX ADMIN — MODAFINIL 100 MILLIGRAM(S): 200 TABLET ORAL at 14:35

## 2018-05-29 RX ADMIN — INSULIN HUMAN 12 UNIT(S): 100 INJECTION, SOLUTION SUBCUTANEOUS at 18:42

## 2018-05-29 RX ADMIN — Medication 5 MILLIGRAM(S): at 06:28

## 2018-05-29 RX ADMIN — ATORVASTATIN CALCIUM 80 MILLIGRAM(S): 80 TABLET, FILM COATED ORAL at 21:39

## 2018-05-29 RX ADMIN — Medication 1 MILLIGRAM(S): at 14:32

## 2018-05-29 RX ADMIN — Medication 1 APPLICATION(S): at 14:33

## 2018-05-29 RX ADMIN — Medication 650 MILLIGRAM(S): at 14:37

## 2018-05-29 RX ADMIN — PANTOPRAZOLE SODIUM 40 MILLIGRAM(S): 20 TABLET, DELAYED RELEASE ORAL at 14:32

## 2018-05-29 RX ADMIN — INSULIN HUMAN 12 UNIT(S): 100 INJECTION, SOLUTION SUBCUTANEOUS at 06:27

## 2018-05-29 RX ADMIN — Medication 15 MILLIGRAM(S): at 09:46

## 2018-05-29 RX ADMIN — Medication 4 MILLILITER(S): at 18:42

## 2018-05-29 RX ADMIN — Medication 10 MILLIGRAM(S): at 21:39

## 2018-05-29 RX ADMIN — Medication 5 MILLIGRAM(S): at 18:42

## 2018-05-29 RX ADMIN — ESCITALOPRAM OXALATE 5 MILLIGRAM(S): 10 TABLET, FILM COATED ORAL at 14:31

## 2018-05-29 RX ADMIN — MIDODRINE HYDROCHLORIDE 10 MILLIGRAM(S): 2.5 TABLET ORAL at 18:42

## 2018-05-29 RX ADMIN — LEVETIRACETAM 500 MILLIGRAM(S): 250 TABLET, FILM COATED ORAL at 14:31

## 2018-05-29 RX ADMIN — Medication 5 MILLILITER(S): at 06:27

## 2018-05-29 RX ADMIN — MIDODRINE HYDROCHLORIDE 15 MILLIGRAM(S): 2.5 TABLET ORAL at 06:28

## 2018-05-29 RX ADMIN — Medication 650 MILLIGRAM(S): at 21:41

## 2018-05-29 RX ADMIN — PIPERACILLIN AND TAZOBACTAM 200 GRAM(S): 4; .5 INJECTION, POWDER, LYOPHILIZED, FOR SOLUTION INTRAVENOUS at 00:23

## 2018-05-29 RX ADMIN — Medication 1 APPLICATION(S): at 06:28

## 2018-05-29 RX ADMIN — PIPERACILLIN AND TAZOBACTAM 200 GRAM(S): 4; .5 INJECTION, POWDER, LYOPHILIZED, FOR SOLUTION INTRAVENOUS at 09:47

## 2018-05-29 RX ADMIN — CALCITRIOL 0.25 MICROGRAM(S): 0.5 CAPSULE ORAL at 14:32

## 2018-05-29 RX ADMIN — INSULIN HUMAN 12 UNIT(S): 100 INJECTION, SOLUTION SUBCUTANEOUS at 00:22

## 2018-05-29 RX ADMIN — Medication 81 MILLIGRAM(S): at 14:32

## 2018-05-29 RX ADMIN — INSULIN HUMAN 4: 100 INJECTION, SOLUTION SUBCUTANEOUS at 00:22

## 2018-05-29 NOTE — PROGRESS NOTE ADULT - SUBJECTIVE AND OBJECTIVE BOX
INTERVAL HPI/OVERNIGHT EVENTS: none     SUBJECTIVE: Patient seen and examined at bedside. ros unable to assess     OBJECTIVE:    VITAL SIGNS:  ICU Vital Signs Last 24 Hrs  T(C): 36.7 (29 May 2018 09:58), Max: 37.1 (28 May 2018 21:55)  T(F): 98 (29 May 2018 09:58), Max: 98.7 (28 May 2018 21:55)  HR: 83 (29 May 2018 09:58) (83 - 92)  BP: 113/81 (29 May 2018 09:58) (92/66 - 113/81)  BP(mean): --  ABP: --  ABP(mean): --  RR: 12 (29 May 2018 09:58) (10 - 22)  SpO2: 100% (29 May 2018 09:58) (99% - 100%)    Mode: AC/ CMV (Assist Control/ Continuous Mandatory Ventilation), RR (machine): 10, TV (machine): 500, FiO2: 40, PEEP: 5, ITime: 0.85, MAP: 7.5, PIP: 23    05-28 @ 07:01  -  05-29 @ 07:00  --------------------------------------------------------  IN: 1464 mL / OUT: 0 mL / NET: 1464 mL      CAPILLARY BLOOD GLUCOSE      POCT Blood Glucose.: 198 mg/dL (29 May 2018 08:24)      PHYSICAL EXAM:    General:on ACVC, comfortable, NAD, sleeping   HEENT: NC/AT; PERRL, anicteric sclera; MMM  Neck: +trach; no secretions  Cardiovascular: +S1/S2; regular and no tachycardia  Respiratory: diminished breath sounds b/l; diffuse b/l crackles; no rhonchi   Gastrointestinal: distended and firm but non-tender and bowel sounds present; PEG in place, no drainage around site, feeds running; no TTP, BSx4  Extremities: WWP; 2+ pitting edema b/l; chronic venous stasis changes bilateral LE, bandage round L ankle and leg.  Vascular: palp peripheral pulses b/l  Neurological:  Alert and awake. follows commands, dystonic reaction present   Skin: Chronic venous stasis changes b/l LE with dressings applied by wound care, Stage 4 sacral decub present     MEDICATIONS:  MEDICATIONS  (STANDING):  acetaminophen    Suspension. 650 milliGRAM(s) Oral every 8 hours  acetylcysteine 20% Inhalation 5 milliLiter(s) Inhalation two times a day  aspirin  chewable 81 milliGRAM(s) Oral daily  atorvastatin 80 milliGRAM(s) Oral at bedtime  calcitriol  Solution 0.25 MICROGram(s) Oral daily  collagenase Ointment 1 Application(s) Topical daily  Dakins Solution - Full Strength 1 Application(s) Topical two times a day  dextrose 5%. 1000 milliLiter(s) (50 mL/Hr) IV Continuous <Continuous>  dextrose 50% Injectable 12.5 Gram(s) IV Push once  dextrose 50% Injectable 25 Gram(s) IV Push once  dextrose 50% Injectable 25 Gram(s) IV Push once  doxercalciferol Injectable 2 MICROGram(s) IV Push once  epoetin amy Injectable 11077 Unit(s) IV Push once  escitalopram 5 milliGRAM(s) Oral daily  folic acid 1 milliGRAM(s) Oral daily  hydrocortisone 10 milliGRAM(s) Oral every 12 hours  insulin regular  human corrective regimen sliding scale   SubCutaneous every 6 hours  insulin regular  human recombinant 12 Unit(s) SubCutaneous every 6 hours  levETIRAcetam  Solution 500 milliGRAM(s) Oral every 24 hours  metoclopramide 5 milliGRAM(s) Oral every 12 hours  midodrine 10 milliGRAM(s) Oral every 8 hours  modafinil 100 milliGRAM(s) Oral daily  multivitamin 1 Tablet(s) Oral daily  pantoprazole   Suspension 40 milliGRAM(s) Oral daily  piperacillin/tazobactam IVPB. 2.25 Gram(s) IV Intermittent every 8 hours    MEDICATIONS  (PRN):  dextrose Gel 1 Dose(s) Oral once PRN Blood Glucose LESS THAN 70 milliGRAM(s)/deciliter  glucagon  Injectable 1 milliGRAM(s) IntraMuscular once PRN Glucose LESS THAN 70 milligrams/deciliter      ALLERGIES:  Allergies    No Known Allergies    Intolerances        LABS:                        8.8    18.1  )-----------( 170      ( 29 May 2018 06:31 )             28.8     05-29    137  |  95<L>  |  54<H>  ----------------------------<  205<H>  5.0   |  27  |  3.75<H>    Ca    8.8      29 May 2018 06:31  Phos  3.0     05-28  Mg     2.2     05-28      PT/INR - ( 29 May 2018 06:31 )   PT: 28.5 sec;   INR: 2.52          PTT - ( 28 May 2018 07:10 )  PTT:40.4 sec

## 2018-05-29 NOTE — PROGRESS NOTE ADULT - PROBLEM SELECTOR PLAN 3
Treated for both septic as well as cardiogenic shock requiring pressors and inotropes. Subsequently weaned off, now on midodrine 15 mg TID. Has intermittently used albumin to tolerate dialysis. BP 80/60s.   - c/w Midodrine 10 mg q8h + add 10 midodrine pre-HD  - hydrocortisone 10mg BID (tapered to this dose on 5/29)  - tolerated HD w/o albumin, f/u renal recs    # r/o adrenal Insufficiency  BP have been low with SBP in , with MAP> 60.  - continue to taper stress dose steroids

## 2018-05-29 NOTE — PROGRESS NOTE ADULT - PROBLEM SELECTOR PLAN 4
Patient with severe systolic CHF with Fluid overload   Fluid restriciton to <1L/day  Daily weight  Strict I/o  Optimize CHF treatment per cardiology/CHF team

## 2018-05-29 NOTE — PROGRESS NOTE ADULT - PROBLEM SELECTOR PLAN 5
Failure likely 2/2 to ischemic ATN with hypoperfusion in setting of shock. Baseline unknown but presenting Cr 3.93 with associated metabolic derangements. Started on HD during course with renal following. Permacath replaced multiple times over course for bacteremia and fungemia. Has R sided Permacath.   - HD per nephro next on 5/29 Failure likely 2/2 to ischemic ATN with hypoperfusion in setting of shock. Baseline unknown but presenting Cr 3.93 with associated metabolic derangements. Started on HD during course with renal following. Permacath replaced multiple times over course for bacteremia and fungemia. Has R sided Permacath.   - HD per nephro next on 5/29    Patient to be considered for AV fistula pending medical optimization for surgical procedure. Patient is currently not medically optimized and requires continued rehabilitation prior to surgical consideration.

## 2018-05-29 NOTE — PROGRESS NOTE ADULT - SUBJECTIVE AND OBJECTIVE BOX
Patient was seen and evaluated on dialysis.   Patient is tolerating the procedure well.   HR: 82 (05-29-18 @ 10:30)  BP: 88/66 (05-29-18 @ 10:30)  Continue dialysis:   Dialyzer: REvaclear 400         QB: 400       QD: 500 2K+  Goal UF 2 to 2.5 kg over 3.5 Hours

## 2018-05-29 NOTE — PROGRESS NOTE ADULT - PROBLEM SELECTOR PLAN 2
Patient's hgb 8.8 at present  No iron deficiency   EPO during  HD treatment.   Transfuse PRBC per primary team as indicated.

## 2018-05-29 NOTE — PROGRESS NOTE ADULT - PROBLEM SELECTOR PLAN 1
Acute respiratory failure in setting of septic and cardiogenic shock, with trach placed on 4/5/18 for persistent, now chronic respiratory failure. CXR showing effusions. On daily HD.   - continues to tolerate HD w/o albumin (cannot go to LTAC if req. albumin)  - intermittent tachypnea likely a component of pain, dilaudid 0.25mg IV PRN  - intermittent fevers now resolved, Started on Zosyn 5/24--> to complete 7 day course

## 2018-05-29 NOTE — PROGRESS NOTE ADULT - PROBLEM SELECTOR PLAN 1
Acute kidney injury due to ischemic ATN requiring ongoing HD treatment via Right chest wall tunnel HD catheter.  Patient was last dialyzed 5/26.  Volume status hypervolemic. K 5.1.   Will schedule for HD treatment today for UF and clearances.  Low k/Low phos/renal diet.

## 2018-05-29 NOTE — PROGRESS NOTE ADULT - SUBJECTIVE AND OBJECTIVE BOX
Patient is a 63y Male seen and evaluated at bedside. Patient lying in bed in no acute distress remains on ventilatory support. Patient last dialyzed on 5/26. Volume status still hypervolemic at present. No new chest xray available at present. Patient offers no complaints. Receiving PEG tube feeding.    acetaminophen    Suspension. 650 every 6 hours PRN  acetylcysteine 20% Inhalation 5 two times a day  aspirin  chewable 81 daily  atorvastatin 80 at bedtime  calcitriol  Solution 0.25 daily  collagenase Ointment 1 daily  Dakins Solution - Full Strength 1 two times a day  dextrose 5%. 1000 <Continuous>  dextrose 50% Injectable 12.5 once  dextrose 50% Injectable 25 once  dextrose 50% Injectable 25 once  dextrose Gel 1 once PRN  doxercalciferol Injectable 2 once  epoetin amy Injectable 52762 once  escitalopram 5 daily  folic acid 1 daily  glucagon  Injectable 1 once PRN  hydrocortisone 15 every 12 hours  insulin regular  human corrective regimen sliding scale  every 6 hours  insulin regular  human recombinant 12 every 6 hours  levETIRAcetam  Solution 500 every 24 hours  metoclopramide 5 every 12 hours  midodrine 15 every 8 hours  modafinil 100 daily  multivitamin 1 daily  pantoprazole   Suspension 40 daily  piperacillin/tazobactam IVPB. 2.25 every 8 hours      Allergies    No Known Allergies    Intolerances        T(C): , Max: 37.1 (05-28-18 @ 21:55)  T(F): , Max: 98.7 (05-28-18 @ 21:55)  HR: 85 (05-29-18 @ 05:31)  BP: 97/69 (05-29-18 @ 05:31)  BP(mean): --  RR: 22 (05-29-18 @ 05:31)  SpO2: 100% (05-29-18 @ 09:21)  Wt(kg): --    05-28 @ 07:01  -  05-29 @ 07:00  --------------------------------------------------------  IN: 1464 mL / OUT: 0 mL / NET: 1464 mL    Review of Systems:  Limited. Patient off ventilatory support ventilatory support    PHYSICAL EXAM:  GENERAL: Ill appearing, awake but confused, disoriented in no acute distress at present  HEAD:  Atraumatic, Normocephalic,   EYES: Bilateral conjuctival and scleral pallor+nt  Oral cavity: Oral mucosa moist and pale  NECK: Neck supple, tracheostomy present.  CHEST/LUNG:  Bilateral decreased breath sounds, Bibasilar rales and crepitatiosn+nt, Right>left, no wheezing  HEART: Regular rate and rhythm. HOMER II/VI at LPSB, No gallop, no rub   ABDOMEN: Soft, Nontender, nondistended, PEG tube present. BS+nt, No flank tenderness.   EXTREMITIES: Bilateral thigh and leg edema++nt, No clubbing, cyanosis  Neurology: AAOx1-2, awake but confused, unable to follow verbal commands  SKIN: No rashes or lesions    ACCESS:  Right chest wall tunnel HD catheter present.   LABS:                        8.8    18.1  )-----------( 170      ( 29 May 2018 06:31 )             28.8     05-29    137  |  95<L>  |  54<H>  ----------------------------<  205<H>  5.0   |  27  |  3.75<H>    Ca    8.8      29 May 2018 06:31  Phos  3.0     05-28  Mg     2.2     05-28        PT/INR - ( 29 May 2018 06:31 )   PT: 28.5 sec;   INR: 2.52          PTT - ( 28 May 2018 07:10 )  PTT:40.4 sec          RADIOLOGY & ADDITIONAL STUDIES:  None

## 2018-05-29 NOTE — PROGRESS NOTE ADULT - PROBLEM SELECTOR PLAN 3
Patient's calcium 8.8, phosphorus 3.0  Low K/Low phos/renal feeding  Continue Hectorol during dialysis

## 2018-05-29 NOTE — PROGRESS NOTE ADULT - PROBLEM SELECTOR PLAN 10
VTE: Coumadin dosed nightly, with INR daily checks, goal 2-3  GI PPx: PPI  DNR- Made DNR, family wants escalation of care, pressors if needed.  F: No IVF in setting of HD  E: Replete electrolytes with caution in setting of HD  N: Jevity 1.5 goal rate 62cc/hr, per updated nutrition recs 5/21  Dispo: SD to [facility], likely chronic vent facility + HD, pending SW placement. Dispo plans and SD plans discussed with daughter Cristal

## 2018-05-30 LAB
APTT BLD: 42.9 SEC — HIGH (ref 27.5–37.4)
CULTURE RESULTS: SIGNIFICANT CHANGE UP
GLUCOSE BLDC GLUCOMTR-MCNC: 110 MG/DL — HIGH (ref 70–99)
GLUCOSE BLDC GLUCOMTR-MCNC: 129 MG/DL — HIGH (ref 70–99)
GLUCOSE BLDC GLUCOMTR-MCNC: 196 MG/DL — HIGH (ref 70–99)
GLUCOSE BLDC GLUCOMTR-MCNC: 212 MG/DL — HIGH (ref 70–99)
GLUCOSE BLDC GLUCOMTR-MCNC: 68 MG/DL — LOW (ref 70–99)
GLUCOSE BLDC GLUCOMTR-MCNC: 87 MG/DL — SIGNIFICANT CHANGE UP (ref 70–99)
HCT VFR BLD CALC: 31.5 % — LOW (ref 39–50)
HGB BLD-MCNC: 9.3 G/DL — LOW (ref 13–17)
INR BLD: 3.59 — HIGH (ref 0.88–1.16)
MCHC RBC-ENTMCNC: 26.3 PG — LOW (ref 27–34)
MCHC RBC-ENTMCNC: 29.5 G/DL — LOW (ref 32–36)
MCV RBC AUTO: 89.2 FL — SIGNIFICANT CHANGE UP (ref 80–100)
ORGANISM # SPEC MICROSCOPIC CNT: SIGNIFICANT CHANGE UP
PLATELET # BLD AUTO: 199 K/UL — SIGNIFICANT CHANGE UP (ref 150–400)
PROTHROM AB SERPL-ACNC: 40.9 SEC — HIGH (ref 9.8–12.7)
RBC # BLD: 3.53 M/UL — LOW (ref 4.2–5.8)
RBC # FLD: 17.7 % — HIGH (ref 10.3–16.9)
SPECIMEN SOURCE: SIGNIFICANT CHANGE UP
WBC # BLD: 15.6 K/UL — HIGH (ref 3.8–10.5)
WBC # FLD AUTO: 15.6 K/UL — HIGH (ref 3.8–10.5)

## 2018-05-30 PROCEDURE — 90935 HEMODIALYSIS ONE EVALUATION: CPT | Mod: GC

## 2018-05-30 PROCEDURE — 99233 SBSQ HOSP IP/OBS HIGH 50: CPT | Mod: GC

## 2018-05-30 PROCEDURE — 71045 X-RAY EXAM CHEST 1 VIEW: CPT | Mod: 26

## 2018-05-30 RX ORDER — DEXTROSE 50 % IN WATER 50 %
25 SYRINGE (ML) INTRAVENOUS ONCE
Qty: 0 | Refills: 0 | Status: COMPLETED | OUTPATIENT
Start: 2018-05-30 | End: 2018-05-30

## 2018-05-30 RX ORDER — INSULIN HUMAN 100 [IU]/ML
15 INJECTION, SOLUTION SUBCUTANEOUS EVERY 6 HOURS
Qty: 0 | Refills: 0 | Status: DISCONTINUED | OUTPATIENT
Start: 2018-05-30 | End: 2018-05-31

## 2018-05-30 RX ORDER — LEVETIRACETAM 250 MG/1
250 TABLET, FILM COATED ORAL ONCE
Qty: 0 | Refills: 0 | Status: COMPLETED | OUTPATIENT
Start: 2018-05-30 | End: 2018-05-30

## 2018-05-30 RX ADMIN — MIDODRINE HYDROCHLORIDE 10 MILLIGRAM(S): 2.5 TABLET ORAL at 19:14

## 2018-05-30 RX ADMIN — Medication 4 MILLILITER(S): at 19:14

## 2018-05-30 RX ADMIN — MIDODRINE HYDROCHLORIDE 10 MILLIGRAM(S): 2.5 TABLET ORAL at 11:22

## 2018-05-30 RX ADMIN — INSULIN HUMAN 15 UNIT(S): 100 INJECTION, SOLUTION SUBCUTANEOUS at 13:37

## 2018-05-30 RX ADMIN — LEVETIRACETAM 250 MILLIGRAM(S): 250 TABLET, FILM COATED ORAL at 19:14

## 2018-05-30 RX ADMIN — Medication 650 MILLIGRAM(S): at 18:17

## 2018-05-30 RX ADMIN — Medication 10 MILLIGRAM(S): at 23:32

## 2018-05-30 RX ADMIN — Medication 1 APPLICATION(S): at 19:15

## 2018-05-30 RX ADMIN — PIPERACILLIN AND TAZOBACTAM 200 GRAM(S): 4; .5 INJECTION, POWDER, LYOPHILIZED, FOR SOLUTION INTRAVENOUS at 02:00

## 2018-05-30 RX ADMIN — Medication 650 MILLIGRAM(S): at 06:37

## 2018-05-30 RX ADMIN — ESCITALOPRAM OXALATE 5 MILLIGRAM(S): 10 TABLET, FILM COATED ORAL at 15:18

## 2018-05-30 RX ADMIN — Medication 5 MILLIGRAM(S): at 19:15

## 2018-05-30 RX ADMIN — Medication 650 MILLIGRAM(S): at 23:32

## 2018-05-30 RX ADMIN — Medication 10 MILLIGRAM(S): at 09:38

## 2018-05-30 RX ADMIN — PIPERACILLIN AND TAZOBACTAM 200 GRAM(S): 4; .5 INJECTION, POWDER, LYOPHILIZED, FOR SOLUTION INTRAVENOUS at 17:22

## 2018-05-30 RX ADMIN — MODAFINIL 100 MILLIGRAM(S): 200 TABLET ORAL at 15:19

## 2018-05-30 RX ADMIN — INSULIN HUMAN 2: 100 INJECTION, SOLUTION SUBCUTANEOUS at 06:37

## 2018-05-30 RX ADMIN — MIDODRINE HYDROCHLORIDE 10 MILLIGRAM(S): 2.5 TABLET ORAL at 06:35

## 2018-05-30 RX ADMIN — INSULIN HUMAN 15 UNIT(S): 100 INJECTION, SOLUTION SUBCUTANEOUS at 19:15

## 2018-05-30 RX ADMIN — INSULIN HUMAN 4: 100 INJECTION, SOLUTION SUBCUTANEOUS at 00:34

## 2018-05-30 RX ADMIN — PIPERACILLIN AND TAZOBACTAM 200 GRAM(S): 4; .5 INJECTION, POWDER, LYOPHILIZED, FOR SOLUTION INTRAVENOUS at 09:38

## 2018-05-30 RX ADMIN — Medication 5 MILLIGRAM(S): at 06:34

## 2018-05-30 RX ADMIN — Medication 81 MILLIGRAM(S): at 15:18

## 2018-05-30 RX ADMIN — Medication 1 APPLICATION(S): at 06:36

## 2018-05-30 RX ADMIN — Medication 1 APPLICATION(S): at 15:19

## 2018-05-30 RX ADMIN — Medication 1 MILLIGRAM(S): at 15:18

## 2018-05-30 RX ADMIN — ATORVASTATIN CALCIUM 80 MILLIGRAM(S): 80 TABLET, FILM COATED ORAL at 23:32

## 2018-05-30 RX ADMIN — Medication 650 MILLIGRAM(S): at 15:17

## 2018-05-30 RX ADMIN — Medication 25 MILLILITER(S): at 22:19

## 2018-05-30 RX ADMIN — Medication 650 MILLIGRAM(S): at 07:37

## 2018-05-30 RX ADMIN — Medication 4 MILLILITER(S): at 06:35

## 2018-05-30 RX ADMIN — INSULIN HUMAN 12 UNIT(S): 100 INJECTION, SOLUTION SUBCUTANEOUS at 00:34

## 2018-05-30 RX ADMIN — Medication 1 TABLET(S): at 15:19

## 2018-05-30 RX ADMIN — CALCITRIOL 0.25 MICROGRAM(S): 0.5 CAPSULE ORAL at 15:18

## 2018-05-30 RX ADMIN — INSULIN HUMAN 12 UNIT(S): 100 INJECTION, SOLUTION SUBCUTANEOUS at 06:37

## 2018-05-30 RX ADMIN — LEVETIRACETAM 500 MILLIGRAM(S): 250 TABLET, FILM COATED ORAL at 15:17

## 2018-05-30 NOTE — PROGRESS NOTE ADULT - SUBJECTIVE AND OBJECTIVE BOX
INTERVAL HPI/OVERNIGHT EVENTS:      On further ROS, patient did not have complaint of: Headache, Blurred Vision, Cough, Dyspnea, Palpitations, Abdominal Pain, N/V, Weakness, Change in Sensation.     VITAL SIGNS:  T(F): 98.2 (05-30-18 @ 09:21)  HR: 83 (05-30-18 @ 09:21)  BP: 94/67 (05-30-18 @ 09:21)  RR: 15 (05-30-18 @ 09:21)  SpO2: 100% (05-30-18 @ 09:21)  Wt(kg): --      Input & Output:  05-29-18 @ 07:01  -  05-30-18 @ 07:00  --------------------------------------------------------  IN: 744 mL / OUT: 1500 mL / NET: -756 mL      PHYSICAL EXAM:  Constitutional: Patient seated comfortably in bed, of appropriate color, nutrition, and hydration.   HEENT: PERRLA, Normal Range of eye movement with no complaint of diplopia, Normal Hearing  Neck: No LAD, No JVD  Back: Normal spine flexure, No CVA tenderness  Respiratory: Normal air entry, Lungs clear to auscultation b/l w/o wheeze or crepitations.   Cardiovascular: S1 and S2 present - no additional abnormal sounds or murmurs. Normal rhythm and rate of pulse.   Gastrointestinal: BS+, soft, NT/ND  Extremities: No peripheral edema  Vascular: 2+ peripheral pulses  Neurological: A/O x 3, CN V-XII grossly intact.  Psychiatric: Normal mood, normal affect  Musculoskeletal: 5/5 strength b/l upper and lower extremities  Skin: No rashes    MEDICATIONS  (STANDING):  acetaminophen    Suspension. 650 milliGRAM(s) Oral every 8 hours  acetylcysteine 20% Inhalation 4 milliLiter(s) Inhalation two times a day  aspirin  chewable 81 milliGRAM(s) Oral daily  atorvastatin 80 milliGRAM(s) Oral at bedtime  calcitriol  Solution 0.25 MICROGram(s) Oral daily  collagenase Ointment 1 Application(s) Topical daily  Dakins Solution - Full Strength 1 Application(s) Topical two times a day  dextrose 5%. 1000 milliLiter(s) (50 mL/Hr) IV Continuous <Continuous>  dextrose 50% Injectable 12.5 Gram(s) IV Push once  dextrose 50% Injectable 25 Gram(s) IV Push once  dextrose 50% Injectable 25 Gram(s) IV Push once  escitalopram 5 milliGRAM(s) Oral daily  folic acid 1 milliGRAM(s) Oral daily  hydrocortisone 10 milliGRAM(s) Oral every 12 hours  insulin regular  human corrective regimen sliding scale   SubCutaneous every 6 hours  insulin regular  human recombinant 15 Unit(s) SubCutaneous every 6 hours  levETIRAcetam  Solution 500 milliGRAM(s) Oral every 24 hours  metoclopramide 5 milliGRAM(s) Oral every 12 hours  midodrine 10 milliGRAM(s) Oral every 8 hours  modafinil 100 milliGRAM(s) Oral daily  multivitamin 1 Tablet(s) Oral daily  piperacillin/tazobactam IVPB. 2.25 Gram(s) IV Intermittent every 8 hours    MEDICATIONS  (PRN):  dextrose Gel 1 Dose(s) Oral once PRN Blood Glucose LESS THAN 70 milliGRAM(s)/deciliter  glucagon  Injectable 1 milliGRAM(s) IntraMuscular once PRN Glucose LESS THAN 70 milligrams/deciliter      Allergies    No Known Allergies    Intolerances      LABS:                        8.8    18.1  )-----------( 170      ( 29 May 2018 06:31 )             28.8     05-29    137  |  95<L>  |  54<H>  ----------------------------<  205<H>  5.0   |  27  |  3.75<H>    Ca    8.8      29 May 2018 06:31      PT/INR - ( 29 May 2018 06:31 )   PT: 28.5 sec;   INR: 2.52           RADIOLOGY & ADDITIONAL TESTS: INTERVAL HPI/OVERNIGHT EVENTS:  Patient tolerated HD yesterday and will have another session today for the purpose of further ultra-filtration. Labs to be drawn during Dialysis with 250mg Keppra dosed afterwards.  Vein mapping of UE performed yesterday in preparation for AV Fistula formation for future dialysis - likely will be performed outpatient setting.    Patient's SBP being maintained in 90-100s range - will not adjust Midodrine/Cortef regimen for a few days to allow for adrenal insufficiency to resolve.     Per last ID note - Zosyn for Decubitus Ulcer to stop after 7 days, will touch base with Dr Johnson re definite duration.        VITAL SIGNS:  T(F): 98.2 (05-30-18 @ 09:21)  HR: 83 (05-30-18 @ 09:21)  BP: 94/67 (05-30-18 @ 09:21)  RR: 15 (05-30-18 @ 09:21)  SpO2: 100% (05-30-18 @ 09:21)  Wt(kg): --      Input & Output:  05-29-18 @ 07:01  -  05-30-18 @ 07:00  --------------------------------------------------------  IN: 744 mL / OUT: 1500 mL / NET: -756 mL      PHYSICAL EXAM:  Constitutional: Patient seated comfortably in bed, of appropriate color, nutrition, and hydration.   HEENT: PERRLA, Normal Range of eye movement with no complaint of diplopia, Normal Hearing  Neck: No LAD, No JVD  Back: Normal spine flexure, No CVA tenderness  Respiratory: Normal air entry, Lungs clear to auscultation b/l w/o wheeze or crepitations.   Cardiovascular: S1 and S2 present - no additional abnormal sounds or murmurs. Normal rhythm and rate of pulse.   Gastrointestinal: BS+, soft, NT/ND  Extremities: No peripheral edema  Vascular: 2+ peripheral pulses  Neurological: A/O x 3, CN V-XII grossly intact.  Psychiatric: Normal mood, normal affect  Musculoskeletal: 5/5 strength b/l upper and lower extremities  Skin: No rashes    MEDICATIONS  (STANDING):  acetaminophen    Suspension. 650 milliGRAM(s) Oral every 8 hours  acetylcysteine 20% Inhalation 4 milliLiter(s) Inhalation two times a day  aspirin  chewable 81 milliGRAM(s) Oral daily  atorvastatin 80 milliGRAM(s) Oral at bedtime  calcitriol  Solution 0.25 MICROGram(s) Oral daily  collagenase Ointment 1 Application(s) Topical daily  Dakins Solution - Full Strength 1 Application(s) Topical two times a day  dextrose 5%. 1000 milliLiter(s) (50 mL/Hr) IV Continuous <Continuous>  dextrose 50% Injectable 12.5 Gram(s) IV Push once  dextrose 50% Injectable 25 Gram(s) IV Push once  dextrose 50% Injectable 25 Gram(s) IV Push once  escitalopram 5 milliGRAM(s) Oral daily  folic acid 1 milliGRAM(s) Oral daily  hydrocortisone 10 milliGRAM(s) Oral every 12 hours  insulin regular  human corrective regimen sliding scale   SubCutaneous every 6 hours  insulin regular  human recombinant 15 Unit(s) SubCutaneous every 6 hours  levETIRAcetam  Solution 500 milliGRAM(s) Oral every 24 hours  metoclopramide 5 milliGRAM(s) Oral every 12 hours  midodrine 10 milliGRAM(s) Oral every 8 hours  modafinil 100 milliGRAM(s) Oral daily  multivitamin 1 Tablet(s) Oral daily  piperacillin/tazobactam IVPB. 2.25 Gram(s) IV Intermittent every 8 hours    MEDICATIONS  (PRN):  dextrose Gel 1 Dose(s) Oral once PRN Blood Glucose LESS THAN 70 milliGRAM(s)/deciliter  glucagon  Injectable 1 milliGRAM(s) IntraMuscular once PRN Glucose LESS THAN 70 milligrams/deciliter      Allergies    No Known Allergies    Intolerances      LABS:                        8.8    18.1  )-----------( 170      ( 29 May 2018 06:31 )             28.8     05-29    137  |  95<L>  |  54<H>  ----------------------------<  205<H>  5.0   |  27  |  3.75<H>    Ca    8.8      29 May 2018 06:31      PT/INR - ( 29 May 2018 06:31 )   PT: 28.5 sec;   INR: 2.52           RADIOLOGY & ADDITIONAL TESTS:

## 2018-05-30 NOTE — PROGRESS NOTE ADULT - SUBJECTIVE AND OBJECTIVE BOX
Patient is a 63y Male seen and evaluated at bedside. Patient lying in bed in no acute distress remains on ventilatory support. Patient last dialyzed on 5/29. Volume status improving. No new chest xray available at present. Receiving PEG tube feeding.    acetaminophen    Suspension. 650 every 8 hours  acetylcysteine 20% Inhalation 4 two times a day  aspirin  chewable 81 daily  atorvastatin 80 at bedtime  calcitriol  Solution 0.25 daily  collagenase Ointment 1 daily  Dakins Solution - Full Strength 1 two times a day  dextrose 5%. 1000 <Continuous>  dextrose 50% Injectable 12.5 once  dextrose 50% Injectable 25 once  dextrose 50% Injectable 25 once  dextrose Gel 1 once PRN  escitalopram 5 daily  folic acid 1 daily  glucagon  Injectable 1 once PRN  hydrocortisone 10 every 12 hours  insulin regular  human corrective regimen sliding scale  every 6 hours  insulin regular  human recombinant 15 every 6 hours  levETIRAcetam  Solution 500 every 24 hours  metoclopramide 5 every 12 hours  midodrine 10 every 8 hours  modafinil 100 daily  multivitamin 1 daily  pantoprazole   Suspension 40 daily  piperacillin/tazobactam IVPB. 2.25 every 8 hours      Allergies    No Known Allergies    Intolerances        T(C): , Max: 37.3 (05-29-18 @ 21:50)  T(F): , Max: 99.1 (05-29-18 @ 21:50)  HR: 83 (05-30-18 @ 09:21)  BP: 94/67 (05-30-18 @ 09:21)  BP(mean): --  RR: 15 (05-30-18 @ 09:21)  SpO2: 100% (05-30-18 @ 09:21)  Wt(kg): --    05-29 @ 07:01  -  05-30 @ 07:00  --------------------------------------------------------  IN: 744 mL / OUT: 1500 mL / NET: -756 mL    Review of Systems:  Limited. Patient off ventilatory support ventilatory support    PHYSICAL EXAM:  GENERAL: Ill appearing, awake but confused, disoriented in no acute distress at present  HEAD:  Atraumatic, Normocephalic,   EYES: Bilateral conjuctival and scleral pallor+nt  Oral cavity: Oral mucosa moist and pale  NECK: Neck supple, tracheostomy present.  CHEST/LUNG:  Bilateral decreased breath sounds, Bibasilar rales and crepitatiosn+nt, Right>left, no wheezing  HEART: Regular rate and rhythm. HOMER II/VI at LPSB, No gallop, no rub   ABDOMEN: Soft, Nontender, nondistended, PEG tube present. BS+nt, No flank tenderness.   EXTREMITIES: Bilateral thigh and leg edema+nt, No clubbing, cyanosis  Neurology: AAOx1-2, drowsy and confused, unable to follow verbal commands  SKIN: No rashes or lesions    ACCESS:  Right chest wall tunnel HD catheter present.        LABS:                        8.8    18.1  )-----------( 170      ( 29 May 2018 06:31 )             28.8     05-29    137  |  95<L>  |  54<H>  ----------------------------<  205<H>  5.0   |  27  |  3.75<H>    Ca    8.8      29 May 2018 06:31        PT/INR - ( 29 May 2018 06:31 )   PT: 28.5 sec;   INR: 2.52                    RADIOLOGY & ADDITIONAL STUDIES:

## 2018-05-30 NOTE — PROGRESS NOTE ADULT - PROBLEM SELECTOR PLAN 8
As noted above. New fevers + elevated WBCs, started on zosyn 5/24-->   RESOLVING     #Fungemia  RESOLVED. Noted to have Candida Tropicalis growing in blood cultures and completed fluconazole course. As noted above. New fevers + elevated WBCs, started on zosyn 5/24-->   RESOLVED    #Fungemia  RESOLVED. Noted to have Candida Tropicalis growing in blood cultures and completed fluconazole course.

## 2018-05-30 NOTE — PROGRESS NOTE ADULT - SUBJECTIVE AND OBJECTIVE BOX
Patient was seen and evaluated on dialysis.   Patient is tolerating the procedure well.   HR: 84 (05-30-18 @ 11:45)  BP: 91/73 (05-30-18 @ 11:45)  Continue dialysis:   Dialyzer: Revaclear 400         QB: 400       QD: 500 3K+  Goal UF 2 to 2.5 kg over 3 Hours

## 2018-05-30 NOTE — PROGRESS NOTE ADULT - PROBLEM SELECTOR PLAN 3
Patient's calcium 8.8, phosphorus 3.0  Low K/Low phos/renal feeding  Continue Hectorol during dialysis next treatment

## 2018-05-30 NOTE — PROGRESS NOTE ADULT - PROBLEM SELECTOR PLAN 10
VTE: Coumadin dosed nightly, with INR daily checks, goal 2-3  GI PPx: PPI  DNR- Made DNR, family wants escalation of care, pressors if needed.  F: No IVF in setting of HD  E: Replete electrolytes with caution in setting of HD  N: Jevity 1.5 goal rate 62cc/hr, per updated nutrition recs 5/21  Dispo: SD to [facility], likely chronic vent facility + HD, pending SW placement. Dispo plans and SD plans discussed with daughter Cristal

## 2018-05-30 NOTE — PROGRESS NOTE ADULT - PROBLEM SELECTOR PLAN 5
Failure likely 2/2 to ischemic ATN with hypoperfusion in setting of shock. Baseline unknown but presenting Cr 3.93 with associated metabolic derangements. Started on HD during course with renal following. Permacath replaced multiple times over course for bacteremia and fungemia. Has R sided Permacath.   - HD per nephro next on 5/29    Patient to be considered for AV fistula pending medical optimization for surgical procedure. Patient is currently not medically optimized and requires continued rehabilitation prior to surgical consideration.

## 2018-05-30 NOTE — PROGRESS NOTE ADULT - PROBLEM SELECTOR PLAN 1
Acute kidney injury due to ischemic ATN requiring ongoing HD treatment via Right chest wall tunnel HD catheter.  Patient was last dialyzed 5/28 with 1.5L UF. Net negative 700 cc in past 24 hours.  Will schedule for additional HD treatment today  Low k/Low phos/renal diet.

## 2018-05-30 NOTE — PROGRESS NOTE ADULT - PROBLEM SELECTOR PLAN 2
Patient's hgb 8.8 at present  No iron deficiency   EPO during next HD treatment.   Transfuse PRBC per primary team as indicated.

## 2018-05-31 LAB
ANION GAP SERPL CALC-SCNC: 14 MMOL/L — SIGNIFICANT CHANGE UP (ref 5–17)
BUN SERPL-MCNC: 29 MG/DL — HIGH (ref 7–23)
CALCIUM SERPL-MCNC: 8.7 MG/DL — SIGNIFICANT CHANGE UP (ref 8.4–10.5)
CHLORIDE SERPL-SCNC: 95 MMOL/L — LOW (ref 96–108)
CO2 SERPL-SCNC: 26 MMOL/L — SIGNIFICANT CHANGE UP (ref 22–31)
CREAT SERPL-MCNC: 2.45 MG/DL — HIGH (ref 0.5–1.3)
GLUCOSE BLDC GLUCOMTR-MCNC: 107 MG/DL — HIGH (ref 70–99)
GLUCOSE BLDC GLUCOMTR-MCNC: 124 MG/DL — HIGH (ref 70–99)
GLUCOSE BLDC GLUCOMTR-MCNC: 150 MG/DL — HIGH (ref 70–99)
GLUCOSE BLDC GLUCOMTR-MCNC: 162 MG/DL — HIGH (ref 70–99)
GLUCOSE BLDC GLUCOMTR-MCNC: 55 MG/DL — LOW (ref 70–99)
GLUCOSE BLDC GLUCOMTR-MCNC: 62 MG/DL — LOW (ref 70–99)
GLUCOSE BLDC GLUCOMTR-MCNC: 91 MG/DL — SIGNIFICANT CHANGE UP (ref 70–99)
GLUCOSE SERPL-MCNC: 119 MG/DL — HIGH (ref 70–99)
HCT VFR BLD CALC: 30.5 % — LOW (ref 39–50)
HGB BLD-MCNC: 8.9 G/DL — LOW (ref 13–17)
INR BLD: 4.7 — HIGH (ref 0.88–1.16)
MCHC RBC-ENTMCNC: 25.6 PG — LOW (ref 27–34)
MCHC RBC-ENTMCNC: 29.2 G/DL — LOW (ref 32–36)
MCV RBC AUTO: 87.9 FL — SIGNIFICANT CHANGE UP (ref 80–100)
PLATELET # BLD AUTO: 205 K/UL — SIGNIFICANT CHANGE UP (ref 150–400)
POTASSIUM SERPL-MCNC: 4.6 MMOL/L — SIGNIFICANT CHANGE UP (ref 3.5–5.3)
POTASSIUM SERPL-SCNC: 4.6 MMOL/L — SIGNIFICANT CHANGE UP (ref 3.5–5.3)
PROTHROM AB SERPL-ACNC: 53.9 SEC — HIGH (ref 9.8–12.7)
RBC # BLD: 3.47 M/UL — LOW (ref 4.2–5.8)
RBC # FLD: 17.7 % — HIGH (ref 10.3–16.9)
SODIUM SERPL-SCNC: 135 MMOL/L — SIGNIFICANT CHANGE UP (ref 135–145)
WBC # BLD: 15.3 K/UL — HIGH (ref 3.8–10.5)
WBC # FLD AUTO: 15.3 K/UL — HIGH (ref 3.8–10.5)

## 2018-05-31 PROCEDURE — 99233 SBSQ HOSP IP/OBS HIGH 50: CPT | Mod: GC

## 2018-05-31 PROCEDURE — 90935 HEMODIALYSIS ONE EVALUATION: CPT | Mod: GC

## 2018-05-31 RX ORDER — INSULIN HUMAN 100 [IU]/ML
13 INJECTION, SOLUTION SUBCUTANEOUS EVERY 6 HOURS
Qty: 0 | Refills: 0 | Status: DISCONTINUED | OUTPATIENT
Start: 2018-05-31 | End: 2018-05-31

## 2018-05-31 RX ORDER — INSULIN HUMAN 100 [IU]/ML
6 INJECTION, SOLUTION SUBCUTANEOUS EVERY 6 HOURS
Qty: 0 | Refills: 0 | Status: DISCONTINUED | OUTPATIENT
Start: 2018-05-31 | End: 2018-05-31

## 2018-05-31 RX ORDER — ERYTHROPOIETIN 10000 [IU]/ML
10000 INJECTION, SOLUTION INTRAVENOUS; SUBCUTANEOUS ONCE
Qty: 0 | Refills: 0 | Status: COMPLETED | OUTPATIENT
Start: 2018-05-31 | End: 2018-05-31

## 2018-05-31 RX ORDER — DOXERCALCIFEROL 2.5 UG/1
2 CAPSULE ORAL ONCE
Qty: 0 | Refills: 0 | Status: COMPLETED | OUTPATIENT
Start: 2018-05-31 | End: 2018-05-31

## 2018-05-31 RX ORDER — INSULIN HUMAN 100 [IU]/ML
13 INJECTION, SOLUTION SUBCUTANEOUS EVERY 6 HOURS
Qty: 0 | Refills: 0 | Status: DISCONTINUED | OUTPATIENT
Start: 2018-05-31 | End: 2018-06-04

## 2018-05-31 RX ORDER — HYDROMORPHONE HYDROCHLORIDE 2 MG/ML
0.25 INJECTION INTRAMUSCULAR; INTRAVENOUS; SUBCUTANEOUS ONCE
Qty: 0 | Refills: 0 | Status: DISCONTINUED | OUTPATIENT
Start: 2018-05-31 | End: 2018-05-31

## 2018-05-31 RX ORDER — DEXTROSE 50 % IN WATER 50 %
50 SYRINGE (ML) INTRAVENOUS ONCE
Qty: 0 | Refills: 0 | Status: COMPLETED | OUTPATIENT
Start: 2018-05-31 | End: 2018-05-31

## 2018-05-31 RX ADMIN — Medication 650 MILLIGRAM(S): at 00:30

## 2018-05-31 RX ADMIN — Medication 1 TABLET(S): at 13:41

## 2018-05-31 RX ADMIN — HYDROMORPHONE HYDROCHLORIDE 0.25 MILLIGRAM(S): 2 INJECTION INTRAMUSCULAR; INTRAVENOUS; SUBCUTANEOUS at 18:09

## 2018-05-31 RX ADMIN — PIPERACILLIN AND TAZOBACTAM 200 GRAM(S): 4; .5 INJECTION, POWDER, LYOPHILIZED, FOR SOLUTION INTRAVENOUS at 02:23

## 2018-05-31 RX ADMIN — Medication 10 MILLIGRAM(S): at 13:41

## 2018-05-31 RX ADMIN — DOXERCALCIFEROL 2 MICROGRAM(S): 2.5 CAPSULE ORAL at 12:21

## 2018-05-31 RX ADMIN — Medication 4 MILLILITER(S): at 17:36

## 2018-05-31 RX ADMIN — Medication 1 APPLICATION(S): at 06:10

## 2018-05-31 RX ADMIN — Medication 1 MILLIGRAM(S): at 13:41

## 2018-05-31 RX ADMIN — Medication 1 APPLICATION(S): at 13:44

## 2018-05-31 RX ADMIN — Medication 650 MILLIGRAM(S): at 13:44

## 2018-05-31 RX ADMIN — INSULIN HUMAN 13 UNIT(S): 100 INJECTION, SOLUTION SUBCUTANEOUS at 13:42

## 2018-05-31 RX ADMIN — Medication 5 MILLIGRAM(S): at 06:10

## 2018-05-31 RX ADMIN — Medication 650 MILLIGRAM(S): at 22:40

## 2018-05-31 RX ADMIN — Medication 5 MILLIGRAM(S): at 17:37

## 2018-05-31 RX ADMIN — Medication 10 MILLIGRAM(S): at 21:40

## 2018-05-31 RX ADMIN — INSULIN HUMAN 2: 100 INJECTION, SOLUTION SUBCUTANEOUS at 13:42

## 2018-05-31 RX ADMIN — INSULIN HUMAN 13 UNIT(S): 100 INJECTION, SOLUTION SUBCUTANEOUS at 01:06

## 2018-05-31 RX ADMIN — ATORVASTATIN CALCIUM 80 MILLIGRAM(S): 80 TABLET, FILM COATED ORAL at 21:40

## 2018-05-31 RX ADMIN — Medication 650 MILLIGRAM(S): at 21:40

## 2018-05-31 RX ADMIN — Medication 4 MILLILITER(S): at 06:10

## 2018-05-31 RX ADMIN — Medication 81 MILLIGRAM(S): at 13:41

## 2018-05-31 RX ADMIN — LEVETIRACETAM 500 MILLIGRAM(S): 250 TABLET, FILM COATED ORAL at 13:49

## 2018-05-31 RX ADMIN — Medication 50 MILLILITER(S): at 18:58

## 2018-05-31 RX ADMIN — ERYTHROPOIETIN 10000 UNIT(S): 10000 INJECTION, SOLUTION INTRAVENOUS; SUBCUTANEOUS at 12:21

## 2018-05-31 RX ADMIN — Medication 650 MILLIGRAM(S): at 14:12

## 2018-05-31 RX ADMIN — MIDODRINE HYDROCHLORIDE 10 MILLIGRAM(S): 2.5 TABLET ORAL at 13:41

## 2018-05-31 RX ADMIN — INSULIN HUMAN 13 UNIT(S): 100 INJECTION, SOLUTION SUBCUTANEOUS at 06:29

## 2018-05-31 RX ADMIN — MODAFINIL 100 MILLIGRAM(S): 200 TABLET ORAL at 13:41

## 2018-05-31 RX ADMIN — Medication 650 MILLIGRAM(S): at 06:09

## 2018-05-31 RX ADMIN — HYDROMORPHONE HYDROCHLORIDE 0.25 MILLIGRAM(S): 2 INJECTION INTRAMUSCULAR; INTRAVENOUS; SUBCUTANEOUS at 17:38

## 2018-05-31 RX ADMIN — ESCITALOPRAM OXALATE 5 MILLIGRAM(S): 10 TABLET, FILM COATED ORAL at 13:41

## 2018-05-31 RX ADMIN — MIDODRINE HYDROCHLORIDE 10 MILLIGRAM(S): 2.5 TABLET ORAL at 06:10

## 2018-05-31 RX ADMIN — Medication 1 APPLICATION(S): at 17:37

## 2018-05-31 RX ADMIN — CALCITRIOL 0.25 MICROGRAM(S): 0.5 CAPSULE ORAL at 13:46

## 2018-05-31 RX ADMIN — MIDODRINE HYDROCHLORIDE 10 MILLIGRAM(S): 2.5 TABLET ORAL at 17:36

## 2018-05-31 NOTE — PROGRESS NOTE ADULT - PROBLEM SELECTOR PLAN 1
Acute respiratory failure in setting of septic and cardiogenic shock, with trach placed on 4/5/18 for persistent, now chronic respiratory failure. CXR showing effusions. On daily HD.   - continues to tolerate HD w/o albumin (cannot go to LTAC if req. albumin)  - intermittent tachypnea likely a component of pain, dilaudid 0.25mg IV PRN  - intermittent fevers now resolved, Started on Zosyn 5/24--> to complete 7 day course Acute respiratory failure in setting of septic and cardiogenic shock, with trach placed on 4/5/18 for persistent, now chronic respiratory failure. CXR showing effusions. On daily HD.   - continues to tolerate HD w/o albumin (cannot go to LTAC if req. albumin)  - intermittent tachypnea likely a component of pain, dilaudid 0.25mg IV PRN  - intermittent fevers now resolved    #Sacral Decubitus Ulcer   - s/p 7 days IV Zosyn   - Continue wound care

## 2018-05-31 NOTE — PROGRESS NOTE ADULT - ATTENDING COMMENTS
plan HD again today as tolertaing treamtents and try to keep negative with fluid overload still and effusions

## 2018-05-31 NOTE — PROGRESS NOTE ADULT - PROBLEM SELECTOR PLAN 4
Patient with severe systolic CHF with Fluid overload   Fluid restriciton to <1L/day with goal fluid balance to be net negative  Daily weight  Strict I/o  Optimize CHF treatment per cardiology/CHF team

## 2018-05-31 NOTE — PROGRESS NOTE ADULT - SUBJECTIVE AND OBJECTIVE BOX
Patient is a 63y Male seen and evaluated at bedside.       acetaminophen    Suspension. 650 every 8 hours  acetylcysteine 20% Inhalation 4 two times a day  aspirin  chewable 81 daily  atorvastatin 80 at bedtime  calcitriol  Solution 0.25 daily  collagenase Ointment 1 daily  Dakins Solution - Full Strength 1 two times a day  dextrose 5%. 1000 <Continuous>  dextrose 50% Injectable 12.5 once  dextrose 50% Injectable 25 once  dextrose 50% Injectable 25 once  dextrose Gel 1 once PRN  doxercalciferol Injectable 2 once  epoetin amy Injectable 50182 once  escitalopram 5 daily  folic acid 1 daily  glucagon  Injectable 1 once PRN  hydrocortisone 10 every 12 hours  insulin regular  human corrective regimen sliding scale  every 6 hours  insulin regular  human recombinant 13 every 6 hours  levETIRAcetam  Solution 500 every 24 hours  metoclopramide 5 every 12 hours  midodrine 10 every 8 hours  modafinil 100 daily  multivitamin 1 daily      Allergies    No Known Allergies    Intolerances        T(C): , Max: 37.7 (05-31-18 @ 05:15)  T(F): , Max: 99.8 (05-31-18 @ 05:15)  HR: 91 (05-31-18 @ 09:26)  BP: 91/66 (05-31-18 @ 09:26)  BP(mean): 81 (05-30-18 @ 11:45)  RR: 14 (05-31-18 @ 09:26)  SpO2: 100% (05-31-18 @ 09:26)  Wt(kg): --    05-30 @ 07:01  -  05-31 @ 07:00  --------------------------------------------------------  IN: 0 mL / OUT: 2000 mL / NET: -2000 mL          Review of Systems:  CONSTITUTIONAL: No fever or chills, No fatigue or tiredness.  EYES: No blurred or double vision.  RESPIRATORY: No shortness of breath, cough, hemoptysis  CARDIOVASCULAR: No Chest pain or shortness of breath  GASTROINTESTINAL: NO abdominal or flank pain, No nausea or vomiting, No diarrhea  GENITOURINARY: No dysuria or urinary burning, No difficulty passing urine, No hematuria  NEUROLOGICAL: No headaches or blurred vision  SKIN: No skin rashes   MUSCULOSKELETAL: No arthralgia, Joint pain, leg edema, No muscle pains      PHYSICAL EXAM:  GENERAL: NAD, well-developed, well nourished, alert, awake, no acute distress at present  HEAD:  Atraumatic, Normocephalic,   EYES: Bilateral conjuctiva and sclera normal   Oral cavity: Oral mucosa dry and pink  NECK: Neck supple, No JVD  CHEST/LUNG: Clear to auscultation bilaterally; No wheeze, no rales, no crepitations  HEART: Regular rate and rhythm. HOMER II/VI at LPSB, No gallop, no rub   ABDOMEN: Soft, Nontender, BS+nt, No flank tenderness.   EXTREMITIES: No clubbing, cyanosis, or edema  Neurology: AAOx3, no focal neurological deficit  SKIN: No rashes or lesions          ACCESS: Right chest wall tunnel HD catheter present.    LABS:                        8.9    15.3  )-----------( 205      ( 31 May 2018 07:51 )             30.5     05-31    135  |  95<L>  |  29<H>  ----------------------------<  119<H>  4.6   |  26  |  2.45<H>    Ca    8.7      31 May 2018 07:45        PT/INR - ( 30 May 2018 13:40 )   PT: 40.9 sec;   INR: 3.59          PTT - ( 30 May 2018 13:40 )  PTT:42.9 sec          RADIOLOGY & ADDITIONAL STUDIES: Patient is a 63y Male seen and evaluated at bedside. Patient lying in bed in no acute distress remains on ventilatory support. Last HD on 5/30 with 2L UF.   acetaminophen    Suspension. 650 every 8 hours  acetylcysteine 20% Inhalation 4 two times a day  aspirin  chewable 81 daily  atorvastatin 80 at bedtime  calcitriol  Solution 0.25 daily  collagenase Ointment 1 daily  Dakins Solution - Full Strength 1 two times a day  dextrose 5%. 1000 <Continuous>  dextrose 50% Injectable 12.5 once  dextrose 50% Injectable 25 once  dextrose 50% Injectable 25 once  dextrose Gel 1 once PRN  doxercalciferol Injectable 2 once  epoetin amy Injectable 87489 once  escitalopram 5 daily  folic acid 1 daily  glucagon  Injectable 1 once PRN  hydrocortisone 10 every 12 hours  insulin regular  human corrective regimen sliding scale  every 6 hours  insulin regular  human recombinant 13 every 6 hours  levETIRAcetam  Solution 500 every 24 hours  metoclopramide 5 every 12 hours  midodrine 10 every 8 hours  modafinil 100 daily  multivitamin 1 daily      Allergies    No Known Allergies    Intolerances        T(C): , Max: 37.7 (05-31-18 @ 05:15)  T(F): , Max: 99.8 (05-31-18 @ 05:15)  HR: 91 (05-31-18 @ 09:26)  BP: 91/66 (05-31-18 @ 09:26)  BP(mean): 81 (05-30-18 @ 11:45)  RR: 14 (05-31-18 @ 09:26)  SpO2: 100% (05-31-18 @ 09:26)  Wt(kg): --    05-30 @ 07:01  -  05-31 @ 07:00  --------------------------------------------------------  IN: 0 mL / OUT: 2000 mL / NET: -2000 mL      Review of Systems:  Limited. Patient off ventilatory support ventilatory support    PHYSICAL EXAM:  GENERAL: Ill appearing, awake but confused, disoriented in no acute distress at present  HEAD:  Atraumatic, Normocephalic,   EYES: Bilateral conjuctival and scleral pallor+nt  Oral cavity: Oral mucosa moist and pale  NECK: Neck supple, tracheostomy present.  CHEST/LUNG:  Bilateral decreased breath sounds, Bibasilar rales and crepitatiosn+nt, Right>left, no wheezing  HEART: Regular rate and rhythm. HOMER II/VI at LPSB, No gallop, no rub   ABDOMEN: Soft, Nontender, nondistended, PEG tube present. BS+nt, No flank tenderness.   EXTREMITIES: Bilateral thigh and leg edema+nt, No clubbing, cyanosis  Neurology: AAOx1-2, drowsy and confused, unable to follow verbal commands  SKIN: No rashes or lesions    ACCESS:  Right chest wall tunnel HD catheter present.            LABS:                        8.9    15.3  )-----------( 205      ( 31 May 2018 07:51 )             30.5     05-31    135  |  95<L>  |  29<H>  ----------------------------<  119<H>  4.6   |  26  |  2.45<H>    Ca    8.7      31 May 2018 07:45        PT/INR - ( 30 May 2018 13:40 )   PT: 40.9 sec;   INR: 3.59          PTT - ( 30 May 2018 13:40 )  PTT:42.9 sec          RADIOLOGY & ADDITIONAL STUDIES:  < from: Xray Chest 1 View- PORTABLE-Urgent (05.30.18 @ 08:48) >    EXAM:  XR CHEST PORTABLE URGENT 1V                          PROCEDURE DATE:  05/30/2018          INTERPRETATION:  Portable chest    History: Follow-up abnormal exam    Pleural effusions grossly similar to prior exam 5/24/2018. Tracheostomy   tube left-sided implanted cardiac device and right-sided venous catheter   again noted.    Impressions:    Pleural effusions right more so than left grossly unchanged.            "Thank you for the opportunity to participate in the care of this   patient."        HESHAM BERRIOS M.D., ATTENDING RADIOLOGIST  This document has been electronically signed. May 30 2018 11:27AM                  < end of copied text >

## 2018-05-31 NOTE — PROGRESS NOTE ADULT - PROBLEM SELECTOR PLAN 2
DM2 on Januvia at home. HbA1C 8.6. Difficulty controlling glucose levels with multiple episodes of hypoglycemia and hyperglycemia. symptomatically hypoglycemic on glucerna (avoid).    - c/w regular insulin 8U q6 --> titrate as needed  - SSI DM2 on Januvia at home. HbA1C 8.6. Difficulty controlling glucose levels with multiple episodes of hypoglycemia and hyperglycemia. symptomatically hypoglycemic on glucerna (avoid).    - c/w regular insulin 13u q 6  - ISS

## 2018-05-31 NOTE — PROGRESS NOTE ADULT - PROBLEM SELECTOR PLAN 2
Patient's hgb 8.9 at present  No iron deficiency   EPO during HD treatment.   Transfuse PRBC per primary team as indicated.

## 2018-05-31 NOTE — PROGRESS NOTE ADULT - PROBLEM SELECTOR PLAN 1
Acute kidney injury due to ischemic ATN requiring ongoing HD treatment via Right chest wall tunnel HD catheter.  Patient was last dialyzed 5/30 with 2L UF  Will schedule  HD treatment today for UF and clearances  Low k/Low phos/renal diet.

## 2018-05-31 NOTE — PROGRESS NOTE ADULT - PROBLEM SELECTOR PLAN 8
As noted above. New fevers + elevated WBCs, started on zosyn 5/24-->   RESOLVED    #Fungemia  RESOLVED. Noted to have Candida Tropicalis growing in blood cultures and completed fluconazole course.

## 2018-05-31 NOTE — CHART NOTE - NSCHARTNOTEFT_GEN_A_CORE
Admitting Diagnosis:   63M PMH HFrEF 10-15% (ischemic), MI, s/p AICD vs PPM, possible Afib, HTN, DM2 on insulin, possible CKD, and gout, who presents with a chief complaint of generalized weakness s/p septic shock likely 2/2 LE cellulitis. AMS and new leukocytisis likely 2/2 UTI. Trach and PEG dependent. Continues to receive HD TIW. Stable on RMF.      PAST MEDICAL & SURGICAL HISTORY:  Type 2 diabetes mellitus with diabetic peripheral angiopathy without gangrene, with long-term curren  Essential hypertension, benign  Gout  Pacemaker  Chronic systolic heart failure  Myocardial infarction  No significant past surgical history      Current Nutrition Order:   Jevity 1.5 Terrell @ 62mL/hr x 24hrs plus ProStat Sugar Free BID (200 kcal, 30g protein) to provide in total: 1488 mL TV, 2432 kcal, 125g protein, 1131 mL free H2O, 148% RDI, 1.40g/kg IBW protein.      PO Intake: Good (%) [   ]  Fair (50-75%) [   ] Poor (<25%) [   ]- N/A NPO with TF    GI Issues: Good tolerance per RN    Pain: Unable to assess at this time 2/2 vent     Skin Integrity:  L buttock stage 2 PU  L calf venous ulcer  Trach stage 3 PU  [facility]     Labs:       135  |  95<L>  |  29<H>  ----------------------------<  119<H>  4.6   |  26  |  2.45<H>    Ca    8.7      31 May 2018 07:45      CAPILLARY BLOOD GLUCOSE      POCT Blood Glucose.: 107 mg/dL (31 May 2018 06:07)  POCT Blood Glucose.: 91 mg/dL (31 May 2018 00:08)  POCT Blood Glucose.: 87 mg/dL (30 May 2018 22:59)  POCT Blood Glucose.: 68 mg/dL (30 May 2018 21:57)  POCT Blood Glucose.: 110 mg/dL (30 May 2018 19:06)  POCT Blood Glucose.: 129 mg/dL (30 May 2018 13:05)      Medications:  MEDICATIONS  (STANDING):  acetaminophen    Suspension. 650 milliGRAM(s) Oral every 8 hours  acetylcysteine 20% Inhalation 4 milliLiter(s) Inhalation two times a day  aspirin  chewable 81 milliGRAM(s) Oral daily  atorvastatin 80 milliGRAM(s) Oral at bedtime  calcitriol  Solution 0.25 MICROGram(s) Oral daily  collagenase Ointment 1 Application(s) Topical daily  Dakins Solution - Full Strength 1 Application(s) Topical two times a day  dextrose 5%. 1000 milliLiter(s) (50 mL/Hr) IV Continuous <Continuous>  dextrose 50% Injectable 12.5 Gram(s) IV Push once  dextrose 50% Injectable 25 Gram(s) IV Push once  dextrose 50% Injectable 25 Gram(s) IV Push once  doxercalciferol Injectable 2 MICROGram(s) IV Push once  epoetin amy Injectable 63563 Unit(s) IV Push once  escitalopram 5 milliGRAM(s) Oral daily  folic acid 1 milliGRAM(s) Oral daily  hydrocortisone 10 milliGRAM(s) Oral every 12 hours  insulin regular  human corrective regimen sliding scale   SubCutaneous every 6 hours  insulin regular  human recombinant 13 Unit(s) SubCutaneous every 6 hours  levETIRAcetam  Solution 500 milliGRAM(s) Oral every 24 hours  metoclopramide 5 milliGRAM(s) Oral every 12 hours  midodrine 10 milliGRAM(s) Oral every 8 hours  modafinil 100 milliGRAM(s) Oral daily  multivitamin 1 Tablet(s) Oral daily    MEDICATIONS  (PRN):  dextrose Gel 1 Dose(s) Oral once PRN Blood Glucose LESS THAN 70 milliGRAM(s)/deciliter  glucagon  Injectable 1 milliGRAM(s) IntraMuscular once PRN Glucose LESS THAN 70 milligrams/deciliter      Weight:  Daily     Daily Weight in k.3 (30 May 2018 14:45)    Weight:  74.6kg ()  80.3kg ()  85.6kg ()  86.8kg ()  86.2kg (5/3)  85.1kg ()  79.1kg ()  75.9kg ()  77.4kg ()  72.7kg ()  77.2kg ()  80.9 kg ()  84.5kg (3/31)  86.9kg (3/19)  94.7 kg (3/16)    Weight Change:   Weight fluctuating throughout admission d/t HD, TF inconsistencies    Estimated energy needs using 89 kg IBW; Needs estimated 2/2 vent/post-op/PU/HD  Calories: 25-30 kcal/kg = 7284-7066 kcal/day  Protein: 1.4-1.6 g/kg = 125-142 g protein/day  Fluids: per team /2 HD     Subjective:   S/p trach and PEG placements on . S/p permacath replacement on . Stable on RMF at this time. Pt seen in room, asleep, undergoing HD. Trached to vent on VC/AC mode. TF running at current goal rate of 62mL/hr w/good tolerance per RN- though per MD note, PEG tube was clogged over night and pt became hypoglycemic. Has been resolved. Currently undergoing HD. Plan for AV fistulogram as outpatient per MD notes. Lytes WNL, , 91, 87mg/dL, BUN 29, Cr 2.45. Pending chronic vent facility w/HD on-site.     Previous Nutrition Diagnosis:   Increased protein-calorie needs RT increased demand for protein-calorie intake AEB on vent support    Active [X]  Resolved [   ]    New PES statement:     Goal:   Continue to meet % of nutrition needs via tolerated route.     Recommendations:  1. Continue w/current EN order; monitor for s/s intolerance, maintain aspiration precautions   2. Continue to trend weights  3 Monitor POC BG and provide adequate insulin coverage  4. Continue to monitor for GOC and keep nutrition in line at all times     Education:   N/A-vent    Risk Level: High [  ] Moderate [ X  ] Low [   ]

## 2018-05-31 NOTE — PROGRESS NOTE ADULT - SUBJECTIVE AND OBJECTIVE BOX
INTERVAL HPI/OVERNIGHT EVENTS:        VITAL SIGNS:  T(F): 98.4 (31 May 2018 09:26), Max: 99.8 (31 May 2018 05:15)  HR: 91 (31 May 2018 09:26) (70 - 96)  BP: 91/66 (31 May 2018 09:26) (91/55 - 98/74)  RR: 14 (31 May 2018 09:26) (13 - 19)  SpO2: 100% (31 May 2018 09:26) (96% - 100%)      Input & Output:  30 May 2018 07:01  -  31 May 2018 07:00  --------------------------------------------------------  IN: 0 mL / OUT: 2000 mL / NET: -2000 mL      PHYSICAL EXAM:  Constitutional: Patient seated comfortably in bed, of appropriate color, nutrition, and hydration.   HEENT: PERRLA, Normal Range of eye movement with no complaint of diplopia, Normal Hearing  Neck: No LAD, No JVD  Back: Normal spine flexure, No CVA tenderness  Respiratory: Normal air entry, Lungs clear to auscultation b/l w/o wheeze or crepitations.   Cardiovascular: S1 and S2 present - no additional abnormal sounds or murmurs. Normal rhythm and rate of pulse.   Gastrointestinal: BS+, soft, NT/ND  Extremities: No peripheral edema  Vascular: 2+ peripheral pulses  Neurological: A/O x 3, CN V-XII grossly intact.  Psychiatric: Normal mood, normal affect  Musculoskeletal: 5/5 strength b/l upper and lower extremities  Skin: No rashes    MEDICATIONS  (STANDING):  acetaminophen    Suspension. 650 milliGRAM(s) Oral every 8 hours  acetylcysteine 20% Inhalation 4 milliLiter(s) Inhalation two times a day  aspirin  chewable 81 milliGRAM(s) Oral daily  atorvastatin 80 milliGRAM(s) Oral at bedtime  calcitriol  Solution 0.25 MICROGram(s) Oral daily  collagenase Ointment 1 Application(s) Topical daily  Dakins Solution - Full Strength 1 Application(s) Topical two times a day  dextrose 5%. 1000 milliLiter(s) (50 mL/Hr) IV Continuous <Continuous>  dextrose 50% Injectable 12.5 Gram(s) IV Push once  dextrose 50% Injectable 25 Gram(s) IV Push once  dextrose 50% Injectable 25 Gram(s) IV Push once  escitalopram 5 milliGRAM(s) Oral daily  folic acid 1 milliGRAM(s) Oral daily  hydrocortisone 10 milliGRAM(s) Oral every 12 hours  insulin regular  human corrective regimen sliding scale   SubCutaneous every 6 hours  insulin regular  human recombinant 15 Unit(s) SubCutaneous every 6 hours  levETIRAcetam  Solution 500 milliGRAM(s) Oral every 24 hours  metoclopramide 5 milliGRAM(s) Oral every 12 hours  midodrine 10 milliGRAM(s) Oral every 8 hours  modafinil 100 milliGRAM(s) Oral daily  multivitamin 1 Tablet(s) Oral daily  piperacillin/tazobactam IVPB. 2.25 Gram(s) IV Intermittent every 8 hours      MEDICATIONS  (PRN):  dextrose Gel 1 Dose(s) Oral once PRN Blood Glucose LESS THAN 70 milliGRAM(s)/deciliter  glucagon  Injectable 1 milliGRAM(s) IntraMuscular once PRN Glucose LESS THAN 70 milligrams/deciliter      Allergies    No Known Allergies    Intolerances      LABS:                        8.8    18.1  )-----------( 170      ( 29 May 2018 06:31 )             28.8     05-29    137  |  95<L>  |  54<H>  ----------------------------<  205<H>  5.0   |  27  |  3.75<H>    Ca    8.8      29 May 2018 06:31      PT/INR - ( 29 May 2018 06:31 )   PT: 28.5 sec;   INR: 2.52           RADIOLOGY & ADDITIONAL TESTS: INTERVAL HPI/OVERNIGHT EVENTS:  Overnight, a low F/S 68 obtained - it was discovered that patient's PEG tube had clogged and it was flushed. Patient received 1/2 amp D50. His insulin regimen was decreased to 13u q6 hours.   Otherwise, patient is for d/c planning to long term care facility with outpatient set up with Vascular Surgery for AV Fistula formation for Dialysis.     Awaiting PT/INR to dose Coumadin tonight. Was held yesterday given supra-therapeutic INR.     VITAL SIGNS:  T(F): 98.4 (31 May 2018 09:26), Max: 99.8 (31 May 2018 05:15)  HR: 91 (31 May 2018 09:26) (70 - 96)  BP: 91/66 (31 May 2018 09:26) (91/55 - 98/74)  RR: 14 (31 May 2018 09:26) (13 - 19)  SpO2: 100% (31 May 2018 09:26) (96% - 100%)      Input & Output:  30 May 2018 07:01  -  31 May 2018 07:00  --------------------------------------------------------  IN: 0 mL / OUT: 2000 mL / NET: -2000 mL      PHYSICAL EXAM:  Constitutional: Patient lying in bed, no acute distress. Trach in situ, Ventilator Dependent.   HEENT: PERRLA  Neck: No LAD, No JVD  Respiratory: Normal air entry, no wheeze or crackles on auscultation.   Cardiovascular: S1 and S2 present - no additional abnormal sounds or murmurs. Normal rhythm and rate of pulse.   Gastrointestinal: BS+, soft, slightly distended and tympanic to percussion.   Extremities: No peripheral edema  Vascular: 2+ peripheral pulses  Neurological: A/O x 3, CN V-XII grossly intact.  Psychiatric: Normal mood, normal affect  Musculoskeletal: 5/5 strength b/l upper and lower extremities  Skin: B/l venous stasis changes present.     MEDICATIONS  (STANDING):  acetaminophen    Suspension. 650 milliGRAM(s) Oral every 8 hours  acetylcysteine 20% Inhalation 4 milliLiter(s) Inhalation two times a day  aspirin  chewable 81 milliGRAM(s) Oral daily  atorvastatin 80 milliGRAM(s) Oral at bedtime  calcitriol  Solution 0.25 MICROGram(s) Oral daily  collagenase Ointment 1 Application(s) Topical daily  Dakins Solution - Full Strength 1 Application(s) Topical two times a day  dextrose 5%. 1000 milliLiter(s) (50 mL/Hr) IV Continuous <Continuous>  dextrose 50% Injectable 12.5 Gram(s) IV Push once  dextrose 50% Injectable 25 Gram(s) IV Push once  dextrose 50% Injectable 25 Gram(s) IV Push once  escitalopram 5 milliGRAM(s) Oral daily  folic acid 1 milliGRAM(s) Oral daily  hydrocortisone 10 milliGRAM(s) Oral every 12 hours  insulin regular  human corrective regimen sliding scale   SubCutaneous every 6 hours  insulin regular  human recombinant 15 Unit(s) SubCutaneous every 6 hours  levETIRAcetam  Solution 500 milliGRAM(s) Oral every 24 hours  metoclopramide 5 milliGRAM(s) Oral every 12 hours  midodrine 10 milliGRAM(s) Oral every 8 hours  modafinil 100 milliGRAM(s) Oral daily  multivitamin 1 Tablet(s) Oral daily  piperacillin/tazobactam IVPB. 2.25 Gram(s) IV Intermittent every 8 hours      MEDICATIONS  (PRN):  dextrose Gel 1 Dose(s) Oral once PRN Blood Glucose LESS THAN 70 milliGRAM(s)/deciliter  glucagon  Injectable 1 milliGRAM(s) IntraMuscular once PRN Glucose LESS THAN 70 milligrams/deciliter      Allergies    No Known Allergies    Intolerances      LABS:                        8.8    18.1  )-----------( 170      ( 29 May 2018 06:31 )             28.8     05-29    137  |  95<L>  |  54<H>  ----------------------------<  205<H>  5.0   |  27  |  3.75<H>    Ca    8.8      29 May 2018 06:31      PT/INR - ( 29 May 2018 06:31 )   PT: 28.5 sec;   INR: 2.52           RADIOLOGY & ADDITIONAL TESTS: INTERVAL HPI/OVERNIGHT EVENTS:  Overnight, a low F/S 68 obtained - it was discovered that patient's PEG tube had clogged and it was flushed. Patient received 1/2 amp D50. His insulin regimen was decreased to 13u q6 hours.   Otherwise, patient is for d/c planning to long term care facility with outpatient set up with Vascular Surgery for AV Fistula formation for Dialysis.     Awaiting PT/INR to dose Coumadin tonight. Was held yesterday given supra-therapeutic INR.       VITAL SIGNS:  T(F): 98.4 (31 May 2018 09:26), Max: 99.8 (31 May 2018 05:15)  HR: 91 (31 May 2018 09:26) (70 - 96)  BP: 91/66 (31 May 2018 09:26) (91/55 - 98/74)  RR: 14 (31 May 2018 09:26) (13 - 19)  SpO2: 100% (31 May 2018 09:26) (96% - 100%)      Input & Output:  30 May 2018 07:01  -  31 May 2018 07:00  --------------------------------------------------------  IN: 0 mL / OUT: 2000 mL / NET: -2000 mL      PHYSICAL EXAM:  Constitutional: Patient lying in bed, no acute distress. Trach in situ, Ventilator Dependent.   HEENT: PERRLA  Neck: No LAD, No JVD  Respiratory: Normal air entry, no wheeze or crackles on auscultation.   Cardiovascular: S1 and S2 present - no additional abnormal sounds or murmurs. Normal rhythm and rate of pulse.   Gastrointestinal: BS+, soft, slightly distended and tympanic to percussion.   Extremities: No peripheral edema  Vascular: 2+ peripheral pulses  Neurological: A/O x 3, CN V-XII grossly intact.  Psychiatric: Normal mood, normal affect  Musculoskeletal: 5/5 strength b/l upper and lower extremities  Skin: B/l venous stasis changes present.     MEDICATIONS  (STANDING):  acetaminophen    Suspension. 650 milliGRAM(s) Oral every 8 hours  acetylcysteine 20% Inhalation 4 milliLiter(s) Inhalation two times a day  aspirin  chewable 81 milliGRAM(s) Oral daily  atorvastatin 80 milliGRAM(s) Oral at bedtime  calcitriol  Solution 0.25 MICROGram(s) Oral daily  collagenase Ointment 1 Application(s) Topical daily  Dakins Solution - Full Strength 1 Application(s) Topical two times a day  dextrose 5%. 1000 milliLiter(s) (50 mL/Hr) IV Continuous <Continuous>  dextrose 50% Injectable 12.5 Gram(s) IV Push once  dextrose 50% Injectable 25 Gram(s) IV Push once  dextrose 50% Injectable 25 Gram(s) IV Push once  escitalopram 5 milliGRAM(s) Oral daily  folic acid 1 milliGRAM(s) Oral daily  hydrocortisone 10 milliGRAM(s) Oral every 12 hours  insulin regular  human corrective regimen sliding scale   SubCutaneous every 6 hours  insulin regular  human recombinant 15 Unit(s) SubCutaneous every 6 hours  levETIRAcetam  Solution 500 milliGRAM(s) Oral every 24 hours  metoclopramide 5 milliGRAM(s) Oral every 12 hours  midodrine 10 milliGRAM(s) Oral every 8 hours  modafinil 100 milliGRAM(s) Oral daily  multivitamin 1 Tablet(s) Oral daily  piperacillin/tazobactam IVPB. 2.25 Gram(s) IV Intermittent every 8 hours      MEDICATIONS  (PRN):  dextrose Gel 1 Dose(s) Oral once PRN Blood Glucose LESS THAN 70 milliGRAM(s)/deciliter  glucagon  Injectable 1 milliGRAM(s) IntraMuscular once PRN Glucose LESS THAN 70 milligrams/deciliter      Allergies    No Known Allergies    Intolerances      LABS:                        8.9    15.3  )-----------( 205      ( 31 May 2018 07:51 )             30.5         RADIOLOGY & ADDITIONAL TESTS: INTERVAL HPI/OVERNIGHT EVENTS:  Overnight, a low F/S 68 obtained - it was discovered that patient's PEG tube had clogged and it was flushed. Patient received 1/2 amp D50. His insulin regimen was decreased to 13u q6 hours.   Otherwise, patient is for d/c planning to long term care facility with outpatient set up with Vascular Surgery for AV Fistula formation for Dialysis.     Awaiting PT/INR to dose Coumadin tonight. Was held yesterday given supra-therapeutic INR.       VITAL SIGNS:  T(F): 98.4 (31 May 2018 09:26), Max: 99.8 (31 May 2018 05:15)  HR: 91 (31 May 2018 09:26) (70 - 96)  BP: 91/66 (31 May 2018 09:26) (91/55 - 98/74)  RR: 14 (31 May 2018 09:26) (13 - 19)  SpO2: 100% (31 May 2018 09:26) (96% - 100%)      Input & Output:  30 May 2018 07:01  -  31 May 2018 07:00  --------------------------------------------------------  IN: 0 mL / OUT: 2000 mL / NET: -2000 mL      PHYSICAL EXAM:  Constitutional: Patient lying in bed, no acute distress. Trach in situ, Ventilator Dependent.   HEENT: PERRLA  Neck: No LAD, No JVD  Respiratory: Normal air entry, no wheeze or crackles on auscultation.   Cardiovascular: S1 and S2 present - no additional abnormal sounds or murmurs. Normal rhythm and rate of pulse.   Gastrointestinal: BS+, soft, slightly distended and tympanic to percussion.   Extremities: No peripheral edema  Vascular: 2+ peripheral pulses  Neurological: A/O x 3, CN V-XII grossly intact.  Psychiatric: Normal mood, normal affect  Musculoskeletal: 5/5 strength b/l upper and lower extremities  Skin: B/l venous stasis changes present.     MEDICATIONS  (STANDING):  acetaminophen    Suspension. 650 milliGRAM(s) Oral every 8 hours  acetylcysteine 20% Inhalation 4 milliLiter(s) Inhalation two times a day  aspirin  chewable 81 milliGRAM(s) Oral daily  atorvastatin 80 milliGRAM(s) Oral at bedtime  calcitriol  Solution 0.25 MICROGram(s) Oral daily  collagenase Ointment 1 Application(s) Topical daily  Dakins Solution - Full Strength 1 Application(s) Topical two times a day  dextrose 5%. 1000 milliLiter(s) (50 mL/Hr) IV Continuous <Continuous>  dextrose 50% Injectable 12.5 Gram(s) IV Push once  dextrose 50% Injectable 25 Gram(s) IV Push once  dextrose 50% Injectable 25 Gram(s) IV Push once  escitalopram 5 milliGRAM(s) Oral daily  folic acid 1 milliGRAM(s) Oral daily  hydrocortisone 10 milliGRAM(s) Oral every 12 hours  insulin regular  human corrective regimen sliding scale   SubCutaneous every 6 hours  insulin regular  human recombinant 15 Unit(s) SubCutaneous every 6 hours  levETIRAcetam  Solution 500 milliGRAM(s) Oral every 24 hours  metoclopramide 5 milliGRAM(s) Oral every 12 hours  midodrine 10 milliGRAM(s) Oral every 8 hours  modafinil 100 milliGRAM(s) Oral daily  multivitamin 1 Tablet(s) Oral daily  piperacillin/tazobactam IVPB. 2.25 Gram(s) IV Intermittent every 8 hours      MEDICATIONS  (PRN):  dextrose Gel 1 Dose(s) Oral once PRN Blood Glucose LESS THAN 70 milliGRAM(s)/deciliter  glucagon  Injectable 1 milliGRAM(s) IntraMuscular once PRN Glucose LESS THAN 70 milligrams/deciliter      Allergies    No Known Allergies    Intolerances      LABS:                        8.9    15.3  )-----------( 205      ( 31 May 2018 07:51 )             30.5       135  |  95<L>  |  29<H>  ----------------------------<  119<H>  4.6   |  26  |  2.45<H>    Ca    8.7      31 May 2018 07:45        RADIOLOGY & ADDITIONAL TESTS:  Planned U/S Vein Mapping.

## 2018-05-31 NOTE — PROGRESS NOTE ADULT - SUBJECTIVE AND OBJECTIVE BOX
Patient was seen and evaluated on dialysis.   Patient is tolerating the procedure well.   HR: 91 (05-31-18 @ 12:10)  BP: 84/62 (05-31-18 @ 12:10)  Continue dialysis:   Dialyzer: Revaclear 400          QB: 400       QD: 500 2k+  Goal UF 2 to 2.5 kg over 3.5 Hours

## 2018-05-31 NOTE — PROGRESS NOTE ADULT - PROBLEM SELECTOR PLAN 3
Patient's calcium 8.7, phosphorus 3.0  Low K/Low phos/renal feeding  Continue Hectorol during dialysis next treatment

## 2018-06-01 DIAGNOSIS — L89.150 PRESSURE ULCER OF SACRAL REGION, UNSTAGEABLE: ICD-10-CM

## 2018-06-01 DIAGNOSIS — Z93.1 GASTROSTOMY STATUS: ICD-10-CM

## 2018-06-01 DIAGNOSIS — A41.9 SEPSIS, UNSPECIFIED ORGANISM: ICD-10-CM

## 2018-06-01 DIAGNOSIS — N17.0 ACUTE KIDNEY FAILURE WITH TUBULAR NECROSIS: ICD-10-CM

## 2018-06-01 DIAGNOSIS — I95.9 HYPOTENSION, UNSPECIFIED: ICD-10-CM

## 2018-06-01 DIAGNOSIS — I48.2 CHRONIC ATRIAL FIBRILLATION: ICD-10-CM

## 2018-06-01 DIAGNOSIS — J96.10 CHRONIC RESPIRATORY FAILURE, UNSPECIFIED WHETHER WITH HYPOXIA OR HYPERCAPNIA: ICD-10-CM

## 2018-06-01 DIAGNOSIS — I25.10 ATHEROSCLEROTIC HEART DISEASE OF NATIVE CORONARY ARTERY WITHOUT ANGINA PECTORIS: ICD-10-CM

## 2018-06-01 DIAGNOSIS — E11.51 TYPE 2 DIABETES MELLITUS WITH DIABETIC PERIPHERAL ANGIOPATHY WITHOUT GANGRENE: ICD-10-CM

## 2018-06-01 DIAGNOSIS — I50.22 CHRONIC SYSTOLIC (CONGESTIVE) HEART FAILURE: ICD-10-CM

## 2018-06-01 LAB
ANION GAP SERPL CALC-SCNC: 14 MMOL/L — SIGNIFICANT CHANGE UP (ref 5–17)
BUN SERPL-MCNC: 25 MG/DL — HIGH (ref 7–23)
CALCIUM SERPL-MCNC: 8.8 MG/DL — SIGNIFICANT CHANGE UP (ref 8.4–10.5)
CHLORIDE SERPL-SCNC: 97 MMOL/L — SIGNIFICANT CHANGE UP (ref 96–108)
CO2 SERPL-SCNC: 27 MMOL/L — SIGNIFICANT CHANGE UP (ref 22–31)
CREAT SERPL-MCNC: 2.15 MG/DL — HIGH (ref 0.5–1.3)
GLUCOSE BLDC GLUCOMTR-MCNC: 114 MG/DL — HIGH (ref 70–99)
GLUCOSE BLDC GLUCOMTR-MCNC: 131 MG/DL — HIGH (ref 70–99)
GLUCOSE BLDC GLUCOMTR-MCNC: 138 MG/DL — HIGH (ref 70–99)
GLUCOSE BLDC GLUCOMTR-MCNC: 139 MG/DL — HIGH (ref 70–99)
GLUCOSE BLDC GLUCOMTR-MCNC: 260 MG/DL — HIGH (ref 70–99)
GLUCOSE BLDC GLUCOMTR-MCNC: 268 MG/DL — HIGH (ref 70–99)
GLUCOSE SERPL-MCNC: 239 MG/DL — HIGH (ref 70–99)
HBV CORE AB SER-ACNC: SIGNIFICANT CHANGE UP
HBV SURFACE AG SER-ACNC: SIGNIFICANT CHANGE UP
HCT VFR BLD CALC: 29.3 % — LOW (ref 39–50)
HCV AB S/CO SERPL IA: 0.25 S/CO — SIGNIFICANT CHANGE UP
HCV AB SERPL-IMP: SIGNIFICANT CHANGE UP
HGB BLD-MCNC: 8.5 G/DL — LOW (ref 13–17)
INR BLD: 4.05 — HIGH (ref 0.88–1.16)
MCHC RBC-ENTMCNC: 25.8 PG — LOW (ref 27–34)
MCHC RBC-ENTMCNC: 29 G/DL — LOW (ref 32–36)
MCV RBC AUTO: 88.8 FL — SIGNIFICANT CHANGE UP (ref 80–100)
PLATELET # BLD AUTO: 210 K/UL — SIGNIFICANT CHANGE UP (ref 150–400)
POTASSIUM SERPL-MCNC: 4.4 MMOL/L — SIGNIFICANT CHANGE UP (ref 3.5–5.3)
POTASSIUM SERPL-SCNC: 4.4 MMOL/L — SIGNIFICANT CHANGE UP (ref 3.5–5.3)
PROTHROM AB SERPL-ACNC: 46.2 SEC — HIGH (ref 9.8–12.7)
RBC # BLD: 3.3 M/UL — LOW (ref 4.2–5.8)
RBC # FLD: 17.8 % — HIGH (ref 10.3–16.9)
SODIUM SERPL-SCNC: 138 MMOL/L — SIGNIFICANT CHANGE UP (ref 135–145)
WBC # BLD: 17.6 K/UL — HIGH (ref 3.8–10.5)
WBC # FLD AUTO: 17.6 K/UL — HIGH (ref 3.8–10.5)

## 2018-06-01 PROCEDURE — 99232 SBSQ HOSP IP/OBS MODERATE 35: CPT | Mod: GC

## 2018-06-01 PROCEDURE — 99233 SBSQ HOSP IP/OBS HIGH 50: CPT

## 2018-06-01 RX ORDER — ERYTHROPOIETIN 10000 [IU]/ML
7000 INJECTION, SOLUTION INTRAVENOUS; SUBCUTANEOUS ONCE
Qty: 0 | Refills: 0 | Status: COMPLETED | OUTPATIENT
Start: 2018-06-02 | End: 2018-06-02

## 2018-06-01 RX ORDER — DOXERCALCIFEROL 2.5 UG/1
2 CAPSULE ORAL ONCE
Qty: 0 | Refills: 0 | Status: COMPLETED | OUTPATIENT
Start: 2018-06-02 | End: 2018-06-02

## 2018-06-01 RX ADMIN — CALCITRIOL 0.25 MICROGRAM(S): 0.5 CAPSULE ORAL at 13:14

## 2018-06-01 RX ADMIN — Medication 1 APPLICATION(S): at 06:26

## 2018-06-01 RX ADMIN — Medication 4 MILLILITER(S): at 06:25

## 2018-06-01 RX ADMIN — ATORVASTATIN CALCIUM 80 MILLIGRAM(S): 80 TABLET, FILM COATED ORAL at 21:35

## 2018-06-01 RX ADMIN — Medication 5 MILLIGRAM(S): at 18:19

## 2018-06-01 RX ADMIN — Medication 650 MILLIGRAM(S): at 06:25

## 2018-06-01 RX ADMIN — Medication 650 MILLIGRAM(S): at 21:35

## 2018-06-01 RX ADMIN — Medication 1 APPLICATION(S): at 13:14

## 2018-06-01 RX ADMIN — MIDODRINE HYDROCHLORIDE 10 MILLIGRAM(S): 2.5 TABLET ORAL at 18:19

## 2018-06-01 RX ADMIN — Medication 5 MILLIGRAM(S): at 06:25

## 2018-06-01 RX ADMIN — INSULIN HUMAN 6: 100 INJECTION, SOLUTION SUBCUTANEOUS at 06:26

## 2018-06-01 RX ADMIN — Medication 650 MILLIGRAM(S): at 13:14

## 2018-06-01 RX ADMIN — INSULIN HUMAN 6: 100 INJECTION, SOLUTION SUBCUTANEOUS at 00:42

## 2018-06-01 RX ADMIN — INSULIN HUMAN 13 UNIT(S): 100 INJECTION, SOLUTION SUBCUTANEOUS at 06:27

## 2018-06-01 RX ADMIN — Medication 1 MILLIGRAM(S): at 13:13

## 2018-06-01 RX ADMIN — MIDODRINE HYDROCHLORIDE 10 MILLIGRAM(S): 2.5 TABLET ORAL at 06:25

## 2018-06-01 RX ADMIN — Medication 10 MILLIGRAM(S): at 13:13

## 2018-06-01 RX ADMIN — Medication 4 MILLILITER(S): at 18:19

## 2018-06-01 RX ADMIN — INSULIN HUMAN 13 UNIT(S): 100 INJECTION, SOLUTION SUBCUTANEOUS at 13:14

## 2018-06-01 RX ADMIN — Medication 650 MILLIGRAM(S): at 07:25

## 2018-06-01 RX ADMIN — MIDODRINE HYDROCHLORIDE 10 MILLIGRAM(S): 2.5 TABLET ORAL at 13:13

## 2018-06-01 RX ADMIN — INSULIN HUMAN 13 UNIT(S): 100 INJECTION, SOLUTION SUBCUTANEOUS at 18:21

## 2018-06-01 RX ADMIN — Medication 650 MILLIGRAM(S): at 22:35

## 2018-06-01 RX ADMIN — Medication 81 MILLIGRAM(S): at 13:13

## 2018-06-01 RX ADMIN — LEVETIRACETAM 500 MILLIGRAM(S): 250 TABLET, FILM COATED ORAL at 13:16

## 2018-06-01 RX ADMIN — ESCITALOPRAM OXALATE 5 MILLIGRAM(S): 10 TABLET, FILM COATED ORAL at 13:12

## 2018-06-01 RX ADMIN — Medication 1 TABLET(S): at 13:13

## 2018-06-01 RX ADMIN — Medication 1 APPLICATION(S): at 18:20

## 2018-06-01 RX ADMIN — Medication 650 MILLIGRAM(S): at 13:44

## 2018-06-01 RX ADMIN — Medication 10 MILLIGRAM(S): at 21:35

## 2018-06-01 RX ADMIN — MODAFINIL 100 MILLIGRAM(S): 200 TABLET ORAL at 13:13

## 2018-06-01 RX ADMIN — INSULIN HUMAN 13 UNIT(S): 100 INJECTION, SOLUTION SUBCUTANEOUS at 00:42

## 2018-06-01 NOTE — PROGRESS NOTE ADULT - PROBLEM SELECTOR PLAN 2
Anemia of chronic renal disease with  hgb 8.5 at present  No iron deficiency   EPO during next HD treatment.   Transfuse PRBC per primary team as indicated.

## 2018-06-01 NOTE — PROGRESS NOTE ADULT - SUBJECTIVE AND OBJECTIVE BOX
Patient is a 63y Male seen and evaluated at bedside. Patient lying in bed in no acute distress remains on ventilatory support at present. Last HD on 5/31 with 2L UF. Tolerated procedure well.     acetaminophen    Suspension. 650 every 8 hours  acetylcysteine 20% Inhalation 4 two times a day  aspirin  chewable 81 daily  atorvastatin 80 at bedtime  calcitriol  Solution 0.25 daily  collagenase Ointment 1 daily  Dakins Solution - Full Strength 1 two times a day  dextrose 5%. 1000 <Continuous>  dextrose 50% Injectable 12.5 once  dextrose 50% Injectable 25 once  dextrose 50% Injectable 25 once  dextrose Gel 1 once PRN  escitalopram 5 daily  folic acid 1 daily  glucagon  Injectable 1 once PRN  hydrocortisone 10 every 12 hours  insulin regular  human corrective regimen sliding scale  every 6 hours  insulin regular  human recombinant 13 every 6 hours  levETIRAcetam  Solution 500 every 24 hours  metoclopramide 5 every 12 hours  midodrine 10 every 8 hours  modafinil 100 daily  multivitamin 1 daily      Allergies    No Known Allergies    Intolerances        T(C): , Max: 37.3 (05-31-18 @ 16:22)  T(F): , Max: 99.2 (05-31-18 @ 16:22)  HR: 91 (06-01-18 @ 09:16)  BP: 98/70 (06-01-18 @ 09:16)  BP(mean): --  RR: 16 (06-01-18 @ 09:16)  SpO2: 100% (06-01-18 @ 09:16)  Wt(kg): --    05-31 @ 07:01  -  06-01 @ 07:00  --------------------------------------------------------  IN: 744 mL / OUT: 0 mL / NET: 744 mL    Review of Systems:  Limited. Patient off ventilatory support ventilatory support    PHYSICAL EXAM:  GENERAL: Ill appearing, awake but confused, disoriented in no acute distress at present  HEAD:  Atraumatic, Normocephalic,   EYES: Bilateral conjuctival and scleral pallor+nt  Oral cavity: Oral mucosa moist and pale  NECK: Neck supple, tracheostomy present.  CHEST/LUNG:  Bilateral decreased breath sounds, Bibasilar rales and crepitatiosn+nt, Right>left, no wheezing  HEART: Regular rate and rhythm. HOMER II/VI at LPSB, No gallop, no rub   ABDOMEN: Soft, Nontender, nondistended, PEG tube present. BS+nt, No flank tenderness.   EXTREMITIES: Bilateral thigh and leg edema+nt, No clubbing, cyanosis  Neurology: AAOx1-2, drowsy and confused, unable to follow verbal commands  SKIN: No rashes or lesions    ACCESS:  Right chest wall tunnel HD catheter present.    LABS:                        8.5    17.6  )-----------( 210      ( 01 Jun 2018 07:27 )             29.3     06-01    138  |  97  |  25<H>  ----------------------------<  239<H>  4.4   |  27  |  2.15<H>    Ca    8.8      01 Jun 2018 07:27        PT/INR - ( 01 Jun 2018 07:27 )   PT: 46.2 sec;   INR: 4.05          PTT - ( 30 May 2018 13:40 )  PTT:42.9 sec          RADIOLOGY & ADDITIONAL STUDIES:    < from: Xray Chest 1 View- PORTABLE-Urgent (05.30.18 @ 08:48) >    EXAM:  XR CHEST PORTABLE URGENT 1V                          PROCEDURE DATE:  05/30/2018          INTERPRETATION:  Portable chest    History: Follow-up abnormal exam    Pleural effusions grossly similar to prior exam 5/24/2018. Tracheostomy   tube left-sided implanted cardiac device and right-sided venous catheter   again noted.    Impressions:    Pleural effusions right more so than left grossly unchanged.            "Thank you for the opportunity to participate in the care of this   patient."        HESHAM BERRIOS M.D., ATTENDING RADIOLOGIST  This document has been electronically signed. May 30 2018 11:27AM                  < end of copied text >

## 2018-06-01 NOTE — PROGRESS NOTE ADULT - PROBLEM SELECTOR PLAN 2
DM2 on Januvia at home. HbA1C 8.6. Difficulty controlling glucose levels with multiple episodes of hypoglycemia and hyperglycemia. Symptomatically hypoglycemic on Glucerna (avoid).    - c/w regular insulin 13u q 6  - ISS

## 2018-06-01 NOTE — CHART NOTE - NSCHARTNOTEFT_GEN_A_CORE
Rapid response called at around 5:35AM as the nurse noticed there was no tracing on ventilator. Entire team responded. Respiratory found disconnection and reconnected. Tracing returned and ventilator functioning well. VSS T:99.1, BP: 109/77, HR: 95, O2sat 100% RR: 20. Senior residents and Senior house officer also at bedside. Continue to monitor patient.

## 2018-06-01 NOTE — DISCHARGE NOTE ADULT - REASON FOR ADMISSION
Septic Shock due to bilateral cellulitis - course complicated by renal failure (on hemodialysis), stroke, and respiratory failure.

## 2018-06-01 NOTE — PROGRESS NOTE ADULT - SUBJECTIVE AND OBJECTIVE BOX
INTERVAL HPI/OVERNIGHT EVENTS:  Patient was a rapid response last night - it was discovered that his ventilator was disconnected as there was no waveform on the screen - upon re-connection and waveform return, patient returned to baseline - SpO2 %, comfortable on ventilator.     VITAL SIGNS:  T(F): 98.3 (01 Jun 2018 09:16), Max: 99.2 (31 May 2018 16:22)  HR: 91 (01 Jun 2018 09:16) (84 - 99)  BP: 98/70 (01 Jun 2018 09:16) (82/56 - 98/70)  RR: 16 (01 Jun 2018 09:16) (10 - 17)  SpO2: 100% (01 Jun 2018 09:16) (98% - 100%)    Input & Output:  31 May 2018 07:01  -  01 Jun 2018 07:00  --------------------------------------------------------  IN: 744 mL / OUT: 0 mL / NET: 744 mL      PHYSICAL EXAM:  Constitutional: Patient lying in bed, no acute distress. Trach in situ, Ventilator Dependent.   HEENT: PERRLA  Neck: No LAD, No JVD  Respiratory: Normal air entry, no wheeze or crackles on auscultation.   Cardiovascular: S1 and S2 present - no additional abnormal sounds or murmurs. Normal rhythm and rate of pulse.   Gastrointestinal: BS+, soft, slightly distended and tympanic to percussion.   Extremities: No peripheral edema  Vascular: 2+ peripheral pulses  Neurological: A/O x 3, CN V-XII grossly intact.  Psychiatric: Normal mood, normal affect  Musculoskeletal: 5/5 strength b/l upper and lower extremities  Skin: B/l venous stasis changes present.     MEDICATIONS  (STANDING):  acetaminophen    Suspension. 650 milliGRAM(s) Oral every 8 hours  acetylcysteine 20% Inhalation 4 milliLiter(s) Inhalation two times a day  aspirin  chewable 81 milliGRAM(s) Oral daily  atorvastatin 80 milliGRAM(s) Oral at bedtime  calcitriol  Solution 0.25 MICROGram(s) Oral daily  collagenase Ointment 1 Application(s) Topical daily  Dakins Solution - Full Strength 1 Application(s) Topical two times a day  dextrose 5%. 1000 milliLiter(s) (50 mL/Hr) IV Continuous <Continuous>  dextrose 50% Injectable 12.5 Gram(s) IV Push once  dextrose 50% Injectable 25 Gram(s) IV Push once  dextrose 50% Injectable 25 Gram(s) IV Push once  escitalopram 5 milliGRAM(s) Oral daily  folic acid 1 milliGRAM(s) Oral daily  hydrocortisone 10 milliGRAM(s) Oral every 12 hours  insulin regular  human corrective regimen sliding scale   SubCutaneous every 6 hours  insulin regular  human recombinant 15 Unit(s) SubCutaneous every 6 hours  levETIRAcetam  Solution 500 milliGRAM(s) Oral every 24 hours  metoclopramide 5 milliGRAM(s) Oral every 12 hours  midodrine 10 milliGRAM(s) Oral every 8 hours  modafinil 100 milliGRAM(s) Oral daily  multivitamin 1 Tablet(s) Oral daily  piperacillin/tazobactam IVPB. 2.25 Gram(s) IV Intermittent every 8 hours      MEDICATIONS  (PRN):  dextrose Gel 1 Dose(s) Oral once PRN Blood Glucose LESS THAN 70 milliGRAM(s)/deciliter  glucagon  Injectable 1 milliGRAM(s) IntraMuscular once PRN Glucose LESS THAN 70 milligrams/deciliter      Allergies    No Known Allergies    Intolerances      LABS:                  8.5    17.6  )-----------( 210      ( 01 Jun 2018 07:27 )             29.3     138  |  97  |  25<H>  ----------------------------<  239<H>  4.4   |  27  |  2.15<H>    Ca    8.8      01 Jun 2018 07:27        RADIOLOGY & ADDITIONAL TESTS:  Ultrasound Vein Mapping Performed.

## 2018-06-01 NOTE — PROGRESS NOTE ADULT - SUBJECTIVE AND OBJECTIVE BOX
Patient is a 63y old  Male who presents with a chief complaint of Septic Shock due to bilateral cellulitis - course complicated by renal failure (on hemodialysis), stroke, and respiratory failure. (2018 14:51)      INTERVAL HPI/OVERNIGHT EVENTS:    Pt. seen and examined at 12:30PM  No complaints elicited     Review of Systems: 12 point review of systems otherwise negative    MEDICATIONS  (STANDING):  acetaminophen    Suspension. 650 milliGRAM(s) Oral every 8 hours  acetylcysteine 20% Inhalation 4 milliLiter(s) Inhalation two times a day  aspirin  chewable 81 milliGRAM(s) Oral daily  atorvastatin 80 milliGRAM(s) Oral at bedtime  calcitriol  Solution 0.25 MICROGram(s) Oral daily  collagenase Ointment 1 Application(s) Topical daily  Dakins Solution - Full Strength 1 Application(s) Topical two times a day  dextrose 5%. 1000 milliLiter(s) (50 mL/Hr) IV Continuous <Continuous>  dextrose 50% Injectable 12.5 Gram(s) IV Push once  dextrose 50% Injectable 25 Gram(s) IV Push once  dextrose 50% Injectable 25 Gram(s) IV Push once  escitalopram 5 milliGRAM(s) Oral daily  folic acid 1 milliGRAM(s) Oral daily  hydrocortisone 10 milliGRAM(s) Oral every 12 hours  insulin regular  human corrective regimen sliding scale   SubCutaneous every 6 hours  insulin regular  human recombinant 13 Unit(s) SubCutaneous every 6 hours  levETIRAcetam  Solution 500 milliGRAM(s) Oral every 24 hours  metoclopramide 5 milliGRAM(s) Oral every 12 hours  midodrine 10 milliGRAM(s) Oral every 8 hours  modafinil 100 milliGRAM(s) Oral daily  multivitamin 1 Tablet(s) Oral daily    MEDICATIONS  (PRN):  dextrose Gel 1 Dose(s) Oral once PRN Blood Glucose LESS THAN 70 milliGRAM(s)/deciliter  glucagon  Injectable 1 milliGRAM(s) IntraMuscular once PRN Glucose LESS THAN 70 milligrams/deciliter      Allergies    No Known Allergies    Intolerances          Vital Signs Last 24 Hrs  T(C): 36.8 (2018 09:16), Max: 37.3 (31 May 2018 16:22)  T(F): 98.3 (2018 09:16), Max: 99.2 (31 May 2018 16:22)  HR: 91 (2018 09:16) (84 - 99)  BP: 98/70 (2018 09:16) (82/56 - 98/70)  BP(mean): --  RR: 16 (2018 09:16) (10 - 16)  SpO2: 100% (2018 09:16) (98% - 100%)  CAPILLARY BLOOD GLUCOSE      POCT Blood Glucose.: 138 mg/dL (2018 12:19)  POCT Blood Glucose.: 268 mg/dL (2018 05:38)  POCT Blood Glucose.: 260 mg/dL (2018 00:26)  POCT Blood Glucose.: 150 mg/dL (31 May 2018 21:44)  POCT Blood Glucose.: 124 mg/dL (31 May 2018 19:34)  POCT Blood Glucose.: 55 mg/dL (31 May 2018 18:46)  POCT Blood Glucose.: 62 mg/dL (31 May 2018 17:18)       @ 07:01  -  06-01 @ 07:00  --------------------------------------------------------  IN: 744 mL / OUT: 0 mL / NET: 744 mL        Physical Exam:  (at 12:30PM)  Daily     Daily Weight in k.7 (2018 14:51)  General:  ill but comfortable-appearing in NAD  HEENT:  +trach to vent  Abdomen:  +PEG  Skin:  WWP  Neuro:  alert    LABS:                        8.5    17.6  )-----------( 210      ( 2018 07:27 )             29.3     06    138  |  97  |  25<H>  ----------------------------<  239<H>  4.4   |  27  |  2.15<H>    Ca    8.8      2018 07:27      PT/INR - ( 2018 07:27 )   PT: 46.2 sec;   INR: 4.05                  RADIOLOGY & ADDITIONAL TESTS:    ---------------------------------------------------------------------------  I personally reviewed: [  ]EKG   [  ]CXR    [  ] CT    [  ]Other  ---------------------------------------------------------------------------  PLEASE CHECK WHEN PRESENT:     [  ]Heart Failure     [  ] Acute     [  ] Acute on Chronic     [  ] Chronic  -------------------------------------------------------------------     [  ]Diastolic [HFpEF]     [  ]Systolic [HFrEF]     [  ]Combined [HFpEF & HFrEF]     [  ]Other:  -------------------------------------------------------------------  [  ]GILMER     [  ]ATN     [  ]Reneal Medullary Necrosis     [  ]Renal Cortical Necrosis     [  ]Other Pathological Lesions:    [  ]CKD 1  [  ]CKD 2  [  ]CKD 3  [  ]CKD 4  [  ]CKD 5  [  ]Other  -------------------------------------------------------------------  [  ]Other/Unspecified:    --------------------------------------------------------------------    Abdominal Nutritional Status  [  ]Malnutrition: See Nutrition Note  [  ]Cachexia  [  ]Other:   [  ]Supplement Ordered:  [  ]Morbid Obesity (BMI >=40]

## 2018-06-01 NOTE — DISCHARGE NOTE ADULT - PATIENT PORTAL LINK FT
You can access the [email or web address] Patient Portal, offered by [facility], by registering with the following website: [email or web address]

## 2018-06-01 NOTE — PROGRESS NOTE ADULT - PROBLEM SELECTOR PROBLEM 6
Type 2 diabetes mellitus with diabetic peripheral angiopathy without gangrene, with long-term curren

## 2018-06-01 NOTE — PROGRESS NOTE ADULT - PROBLEM SELECTOR PLAN 3
Treated for both septic as well as cardiogenic shock requiring pressors and inotrope. Subsequently weaned off, now on midodrine 15 mg TID. Has intermittently used albumin to tolerate dialysis. BP 80/60s.   - c/w Midodrine 10 mg q8h + add 10 midodrine pre-HD  - hydrocortisone 10mg BID (tapered to this dose on 5/29)  - tolerated HD w/o albumin, f/u renal recs    # r/o adrenal Insufficiency  BP have been low with SBP in , with MAP> 60.  - continue to taper stress dose steroids

## 2018-06-01 NOTE — DISCHARGE NOTE ADULT - SECONDARY DIAGNOSIS.
Chronic respiratory failure Acute renal failure Afib Type 2 diabetes mellitus with diabetic peripheral angiopathy without gangrene, with long-term curren Infected decubitus ulcer, stage IV

## 2018-06-01 NOTE — PROGRESS NOTE ADULT - PROBLEM SELECTOR PLAN 10
VTE: Coumadin dosed nightly, with INR daily checks, goal 2-3  GI PPx: PPI  DNR- Made DNR, family wants escalation of care, pressors if needed.  F: No IVF in setting of HD  E: Replete electrolytes with caution in setting of HD  N: Jevity 1.5 goal rate 62cc/hr, per updated nutrition recs 5/21  Dispo: SD to [facility], likely chronic vent facility + HD, pending SW placement. Dispo plans and SD plans discussed with daughter Cristal

## 2018-06-01 NOTE — PROGRESS NOTE ADULT - PROBLEM SELECTOR PLAN 1
Acute kidney injury due to ischemic ATN requiring ongoing HD treatment via Right chest wall tunnel HD catheter.  Patient was last dialyzed 5/31 with 2L UF  Will defer HD treatment today.   Next anticipated HD on 6/2  Low k/Low phos/renal diet.

## 2018-06-01 NOTE — DISCHARGE NOTE ADULT - PLAN OF CARE
Continuation of ventilatory support via tracheostomy. Patient's hospital course was complicated by respiratory failure - patient was intubated in the ICU given development of septic shock and acute respiratory failure. Patient was unable to be weaned off the ventilator support while he was intubated - therefore, it became necessary to place a tracheostomy for continue airway support. The tracheostomy tube is a 6.0 cuffed Shiley tube. It was replaced by ENT on June 1st, 2018. The ventilator settings are as follows: Mode: AC (Assist Control)/VC | Respiratory Rate 10/min | Tidal Volume 500cc/breath | PEEP 5 | FiO2 40% | Inspiratory Time 0.85 sec. Please maintain these settings while patient is at the long term care facility. Continuation of Hemodialysis on a thrice weekly basis. Patient developed renal failure as a result of severe septic shock - it is likely a result of prolonged decreased blood flow to the kidneys. As a result, he is now hemodialysis dependent. Please continue to monitor INR while patient is on Warfarin and dose for a goal INR of 2-3. Patient should be dosed Warfarin to maintain INR 2-3. The reason for this is both atrial fibrillation and presence of a blood clot within the heart. His INR has been sporadic, often supra-therapeutic. His INR should be monitored on a daily basis with Warfarin dosed according to the INR. If INR > 3 - Warfarin should be held. If INR 2-3, 2.5mg warfarin should be dosed initially. INR monitoring can be decreased to weekly once it has been established that INR levels are consistently 2-3 on a routine dose of Warfarin. Please continue to administer short acting Insulin every 6 hours while patient is on continuous enteral feeds. Patient is to receive 13u Lispro (short acting insulin) every 6 hours while he is on continuous feeds

## 2018-06-01 NOTE — PROGRESS NOTE ADULT - SUBJECTIVE AND OBJECTIVE BOX
ENT Progress Note    HPI: 63M s/p tracheotomy with placement of 6.0 cuffed shiley trach with Renato flap.    ENT now asked to change trach to new 6.0 cuffed shiley trach prior to his discharge to LTAC.    PE:  NAD  Breathing comfortably  [id number]  6.0 cuffless shiley trach removed and new 6.0 cuffed shiley trach inserted  Minimal bleeding encountered.    - Contact ENT with any questions or concerns  - Patient with well formed stoma, safe for trach change in the future by outside primary team or respiratory therapy.

## 2018-06-01 NOTE — DISCHARGE NOTE ADULT - CARE PLAN
Principal Discharge DX:	Septic shock  Secondary Diagnosis:	Chronic respiratory failure  Goal:	Continuation of ventilatory support via tracheostomy.  Assessment and plan of treatment:	Patient's hospital course was complicated by respiratory failure - patient was intubated in the ICU given development of septic shock and acute respiratory failure. Patient was unable to be weaned off the ventilator support while he was intubated - therefore, it became necessary to place a tracheostomy for continue airway support. The tracheostomy tube is a 6.0 cuffed Shiley tube. It was replaced by ENT on June 1st, 2018. The ventilator settings are as follows: Mode: AC (Assist Control)/VC | Respiratory Rate 10/min | Tidal Volume 500cc/breath | PEEP 5 | FiO2 40% | Inspiratory Time 0.85 sec. Please maintain these settings while patient is at the long term care facility.  Secondary Diagnosis:	Acute renal failure  Goal:	Continuation of Hemodialysis on a thrice weekly basis.  Assessment and plan of treatment:	Patient developed renal failure as a result of severe septic shock - it is likely a result of prolonged decreased blood flow to the kidneys. As a result, he is now hemodialysis dependent.  Secondary Diagnosis:	Afib  Goal:	Please continue to monitor INR while patient is on Warfarin and dose for a goal INR of 2-3.  Assessment and plan of treatment:	Patient should be dosed Warfarin to maintain INR 2-3. The reason for this is both atrial fibrillation and presence of a blood clot within the heart. His INR has been sporadic, often supra-therapeutic. His INR should be monitored on a daily basis with Warfarin dosed according to the INR. If INR > 3 - Warfarin should be held. If INR 2-3, 2.5mg warfarin should be dosed initially. INR monitoring can be decreased to weekly once it has been established that INR levels are consistently 2-3 on a routine dose of Warfarin.  Secondary Diagnosis:	Type 2 diabetes mellitus with diabetic peripheral angiopathy without gangrene, with long-term curren  Goal:	Please continue to administer short acting Insulin every 6 hours while patient is on continuous enteral feeds.  Assessment and plan of treatment:	Patient is to receive 13u Lispro (short acting insulin) every 6 hours while he is on continuous feeds  Secondary Diagnosis:	Infected decubitus ulcer, stage IV

## 2018-06-01 NOTE — PROGRESS NOTE ADULT - PROBLEM SELECTOR PLAN 1
Acute respiratory failure in setting of septic and cardiogenic shock, with trach placed on 4/5/18 for persistent, now chronic respiratory failure. CXR showing effusions. On daily HD.   - Continues to tolerate HD w/o albumin (cannot go to LTAC if req. albumin)  - Intermittent tachypnea likely a component of pain, Dilaudid 0.25mg IV PRN  - ENT to replace trach before patient is accepted to [facility]     #Sacral Decubitus Ulcer   - s/p 7 days IV Zosyn   - Continue wound care

## 2018-06-01 NOTE — DISCHARGE NOTE ADULT - HOSPITAL COURSE
63M PMH HFrEF 10-15% (ischemic), CAD (STEMI per [facility] records 7/17), s/p AICD, Afib, HTN, DM2 (on insulin), CKD, gout admitted for septic shock 2/2 LE cellulitis as well as concern for ATN in setting of shock. Patient was admitted, started on Vanc/Zosyn for broad coverage given steroid use,  and started on levophed for presumed septic shock. However given low mix venous saturation there was c/f component of cardiogenic shock and pt was given inotropes however this was d/c due to tachycardia. Patient completed 11 day course of Vanc/Zosyn. He received an extensive infectious w/u which included a gallium scan c/w LLE cellulitis and a TTE w/o vegetations. Patient’s course was c/b worsening renal function and eventual anuria, for which renal was consulted. HD cath was placed and pt graciela CVVD and eventually transitioned to intermittent HD. His course was further c/b b/l pulmonary effusions requiring R chest tube placement. Given extensive cardiac hx and component of cardiogenic shock, cardiology was consulted and pt started on vasopressin for blood pressure and afterload reducers however unable to tolerate afterload reduction. Transient c/f HIT given platelet drop while on hep gtt, however ruled out and pt transitioned back to hep gtt and then bridged to Coumadin for AFib AC. Patient’s course was further c/b stroke when patient became unresponsive with decreased use of L side (stroke code -- CT negative, MR showing chronic infarcts and possible small brainstem stroke) requiring intubation for airway protection. His mental status did not improve after starting modafinil, vEEG done to r/o seizures showing slowing but no epileptiform discharges. Patient trach’d and PEG’d because unable to wean off vent and unable to mentate enough to swallow. Pt later found to be fungemic with candida tropicalis and treated with micafungin then narrowed to fluconazole. RUKHSANA at that time negative for vegetations or AICD infection. CT A/P performed to evaluate source which was unrevealing. HD cath removed and replaced after surveillance cultures remained negative. Course further complicated by Klebsiella bacteremia, treated with meropenem then de-escalated to Zosyn and de-escalated further to CTX. Upon weaning from pressors, pt started on hydrocortisone and midodrine. Pt with persistent fevers, HD cath again removed 4/19 and replaced with temporary HD cath. Patient then noted to have seizure like activity and started on Keppra. Pt also subsequently found to have septic shock 2/2 aspiration PNA, s/p treatment with Zosyn and pressors. Pt again weaned off pressors to stress dose steroids and midodrine for adrenal insufficiency. When stepped down from MICU to 7LA, patient began process of vent weaning, began tolerating trach collar. HD was continued per renal without requirement of albumin with end goal of L-TACH. Pt currently being tapered off stress steroids with goal of resuming previous regimen of prednisone 6 mg and fludrocortisone 0.1 mg daily if BP allows. Course c/b hyperglycemia to 280s in setting of resumed on feeds and stress dose steroids, insulin regimen and feeds adjusted with appropriate control of sugars. Also noted to have complications worsening tolerance of HD in setting of worsening tachycardia and decreased pressures for which patient restarted on albumin for HD. Also of note, patient made DNR with further discussion with family regarding goals of care- but continued escalation of care, with pressors, etc. Improving tolerance of HD, eventually weaned off albumin. Continued adjustments for insulin based on requirements and daily dosing of warfarin to maintain INR 2-3. Patient now to get 10mg midodrine pre-HD (needs to be ordered as one time dose pre-HD), and weaning hydrocortisone (most recently weaned to 15 BID). Pt HD stable, ready for SD to [facility], pending d/c to long term care facility with HD and vent weaning. Discussed SD and discharge plans with daughter, Cristal, who is HCP. 63M PMH HFrEF 10-15% (ischemic), CAD (STEMI per [facility] records 7/17), s/p AICD, Afib, HTN, DM2 (on insulin), CKD, gout admitted for septic shock 2/2 LE cellulitis as well as concern for ATN in setting of shock. Patient was admitted, started on broad spectrum antibiotics and started on pressors for presumed septic shock. However given low mix venous saturation there was c/f component of cardiogenic shock and pt was given inotropes however this was d/c due to tachycardia. He received an extensive infectious w/u which included a gallium scan c/w LLE cellulitis and a TTE w/o vegetations. Patient’s course was c/b worsening renal function and eventual anuria, for which renal was consulted. HD cath was placed and pt began CVVD and eventually transitioned to intermittent HD. His course was further c/b b/l pulmonary effusions requiring R chest tube placement. Given extensive cardiac hx and component of cardiogenic shock, cardiology was consulted and pt started on vasopressin for blood pressure and afterload reducers however unable to tolerate afterload reduction. Transient c/f HIT given platelet drop while on hep gtt, however ruled out and pt transitioned back to hep gtt and then bridged to Coumadin for AFib AC. Patient’s course was further c/b stroke when patient became unresponsive with decreased use of L side (stroke code -- CT negative, MR showing chronic infarcts and possible small brainstem stroke) requiring intubation for airway protection. His mental status did not improve after starting modafinil, vEEG done to r/o seizures showing slowing but no epileptiform discharges. Patient trach’d and PEG’d because unable to wean off vent and unable to mentate enough to swallow. Pt later found to be fungemic with candida tropicalis and treated with micafungin then narrowed to fluconazole. RUKHSANA at that time negative for vegetations or AICD infection. CT A/P performed to evaluate source which was unrevealing. HD cath removed and replaced after surveillance cultures remained negative. Course further complicated by Klebsiella bacteremia, treated with meropenem then de-escalated to Zosyn and de-escalated further to CTX. Upon weaning from pressors, pt started on hydrocortisone and midodrine. Pt with persistent fevers, HD cath again removed 4/19 and replaced with temporary HD cath. Patient then noted to have seizure like activity and started on Keppra. Pt also subsequently found to have septic shock 2/2 aspiration PNA, s/p treatment with Zosyn and pressors. Pt again weaned off pressors to stress dose steroids and midodrine for adrenal insufficiency. Patient unable to be weaned to trach collar and continued on mechanical ventilation. HD was continued on HD and eventually weaned off of albumin with end goal of L-TACH. Patient made DNR with further discussion with family regarding goals of care- but continued escalation of care, with pressors, etc. Course then c/b sepsis 2/2 candidal UTI and sacral decub ulcer with e/o osteomyelitis and abscess. Plastic surgery involved and debrided ulcer, but assessment was that ulcer was unstageable and incurable. Patient was deemed not a surgical candidate based on his comorbidities and inability lay in prone positioning post-op ([id number]). Multidisciplinary family meeting held involving social work, medical team, nursing staff to discuss utility in continuation of abx therapy given the complexity of the patients condition and poor prognosis. Family shared that they understand that pt is not improving despite all effort and made the decision to pursue comfort measures including no MEWS, escalation of care or continuation of antibiotic therapy. Patient

## 2018-06-02 LAB
APTT BLD: 46.7 SEC — HIGH (ref 27.5–37.4)
GLUCOSE BLDC GLUCOMTR-MCNC: 125 MG/DL — HIGH (ref 70–99)
GLUCOSE BLDC GLUCOMTR-MCNC: 164 MG/DL — HIGH (ref 70–99)
GLUCOSE BLDC GLUCOMTR-MCNC: 194 MG/DL — HIGH (ref 70–99)
GLUCOSE BLDC GLUCOMTR-MCNC: 246 MG/DL — HIGH (ref 70–99)
GLUCOSE BLDC GLUCOMTR-MCNC: 301 MG/DL — HIGH (ref 70–99)
GLUCOSE BLDC GLUCOMTR-MCNC: 305 MG/DL — HIGH (ref 70–99)
GLUCOSE BLDC GLUCOMTR-MCNC: 47 MG/DL — LOW (ref 70–99)
GLUCOSE BLDC GLUCOMTR-MCNC: 80 MG/DL — SIGNIFICANT CHANGE UP (ref 70–99)
GLUCOSE BLDC GLUCOMTR-MCNC: 97 MG/DL — SIGNIFICANT CHANGE UP (ref 70–99)
HBV CORE IGM SER-ACNC: SIGNIFICANT CHANGE UP
HBV SURFACE AB SER-ACNC: SIGNIFICANT CHANGE UP
HCT VFR BLD CALC: 29.2 % — LOW (ref 39–50)
HGB BLD-MCNC: 8.5 G/DL — LOW (ref 13–17)
INR BLD: 2.59 — HIGH (ref 0.88–1.16)
INR BLD: 3.1 — HIGH (ref 0.88–1.16)
MCHC RBC-ENTMCNC: 25.9 PG — LOW (ref 27–34)
MCHC RBC-ENTMCNC: 29.1 G/DL — LOW (ref 32–36)
MCV RBC AUTO: 89 FL — SIGNIFICANT CHANGE UP (ref 80–100)
PLATELET # BLD AUTO: 195 K/UL — SIGNIFICANT CHANGE UP (ref 150–400)
PROTHROM AB SERPL-ACNC: 29.3 SEC — HIGH (ref 9.8–12.7)
PROTHROM AB SERPL-ACNC: 35.2 SEC — HIGH (ref 9.8–12.7)
RBC # BLD: 3.28 M/UL — LOW (ref 4.2–5.8)
RBC # FLD: 18.3 % — HIGH (ref 10.3–16.9)
WBC # BLD: 20.7 K/UL — HIGH (ref 3.8–10.5)
WBC # FLD AUTO: 20.7 K/UL — HIGH (ref 3.8–10.5)

## 2018-06-02 PROCEDURE — 99232 SBSQ HOSP IP/OBS MODERATE 35: CPT

## 2018-06-02 PROCEDURE — 90935 HEMODIALYSIS ONE EVALUATION: CPT | Mod: GC

## 2018-06-02 RX ORDER — DEXTROSE 50 % IN WATER 50 %
25 SYRINGE (ML) INTRAVENOUS ONCE
Qty: 0 | Refills: 0 | Status: COMPLETED | OUTPATIENT
Start: 2018-06-02 | End: 2018-06-02

## 2018-06-02 RX ADMIN — Medication 1 APPLICATION(S): at 17:44

## 2018-06-02 RX ADMIN — INSULIN HUMAN 8: 100 INJECTION, SOLUTION SUBCUTANEOUS at 06:57

## 2018-06-02 RX ADMIN — Medication 81 MILLIGRAM(S): at 12:28

## 2018-06-02 RX ADMIN — DOXERCALCIFEROL 2 MICROGRAM(S): 2.5 CAPSULE ORAL at 10:46

## 2018-06-02 RX ADMIN — MIDODRINE HYDROCHLORIDE 10 MILLIGRAM(S): 2.5 TABLET ORAL at 23:01

## 2018-06-02 RX ADMIN — Medication 4 MILLILITER(S): at 17:43

## 2018-06-02 RX ADMIN — Medication 5 MILLIGRAM(S): at 17:43

## 2018-06-02 RX ADMIN — CALCITRIOL 0.25 MICROGRAM(S): 0.5 CAPSULE ORAL at 12:41

## 2018-06-02 RX ADMIN — Medication 650 MILLIGRAM(S): at 14:41

## 2018-06-02 RX ADMIN — Medication 1 TABLET(S): at 12:27

## 2018-06-02 RX ADMIN — ATORVASTATIN CALCIUM 80 MILLIGRAM(S): 80 TABLET, FILM COATED ORAL at 23:01

## 2018-06-02 RX ADMIN — MODAFINIL 100 MILLIGRAM(S): 200 TABLET ORAL at 12:27

## 2018-06-02 RX ADMIN — Medication 25 MILLILITER(S): at 18:14

## 2018-06-02 RX ADMIN — INSULIN HUMAN 13 UNIT(S): 100 INJECTION, SOLUTION SUBCUTANEOUS at 13:29

## 2018-06-02 RX ADMIN — MIDODRINE HYDROCHLORIDE 10 MILLIGRAM(S): 2.5 TABLET ORAL at 17:42

## 2018-06-02 RX ADMIN — Medication 10 MILLIGRAM(S): at 12:27

## 2018-06-02 RX ADMIN — Medication 650 MILLIGRAM(S): at 23:02

## 2018-06-02 RX ADMIN — Medication 1 MILLIGRAM(S): at 12:27

## 2018-06-02 RX ADMIN — Medication 5 MILLIGRAM(S): at 07:19

## 2018-06-02 RX ADMIN — ERYTHROPOIETIN 7000 UNIT(S): 10000 INJECTION, SOLUTION INTRAVENOUS; SUBCUTANEOUS at 10:46

## 2018-06-02 RX ADMIN — Medication 1 APPLICATION(S): at 12:29

## 2018-06-02 RX ADMIN — INSULIN HUMAN 13 UNIT(S): 100 INJECTION, SOLUTION SUBCUTANEOUS at 06:58

## 2018-06-02 RX ADMIN — INSULIN HUMAN 13 UNIT(S): 100 INJECTION, SOLUTION SUBCUTANEOUS at 23:28

## 2018-06-02 RX ADMIN — INSULIN HUMAN 2: 100 INJECTION, SOLUTION SUBCUTANEOUS at 23:28

## 2018-06-02 RX ADMIN — LEVETIRACETAM 500 MILLIGRAM(S): 250 TABLET, FILM COATED ORAL at 13:32

## 2018-06-02 RX ADMIN — Medication 1 APPLICATION(S): at 07:20

## 2018-06-02 RX ADMIN — Medication 650 MILLIGRAM(S): at 07:18

## 2018-06-02 RX ADMIN — MIDODRINE HYDROCHLORIDE 10 MILLIGRAM(S): 2.5 TABLET ORAL at 07:22

## 2018-06-02 RX ADMIN — Medication 10 MILLIGRAM(S): at 23:01

## 2018-06-02 RX ADMIN — INSULIN HUMAN 13 UNIT(S): 100 INJECTION, SOLUTION SUBCUTANEOUS at 00:29

## 2018-06-02 RX ADMIN — Medication 650 MILLIGRAM(S): at 13:30

## 2018-06-02 RX ADMIN — Medication 650 MILLIGRAM(S): at 08:18

## 2018-06-02 RX ADMIN — Medication 4 MILLILITER(S): at 07:21

## 2018-06-02 RX ADMIN — ESCITALOPRAM OXALATE 5 MILLIGRAM(S): 10 TABLET, FILM COATED ORAL at 12:27

## 2018-06-02 NOTE — PROGRESS NOTE ADULT - PROBLEM SELECTOR PLAN 10
VTE: Coumadin dosed nightly, with INR daily checks, goal 2-3  GI PPx: PPI  DNR- Made DNR, family wants escalation of care, pressors if needed.  F: No IVF in setting of HD  E: Replete electrolytes with caution in setting of HD  N: Jevity 1.5 goal rate 62cc/hr, per updated nutrition recs 5/21  Dispo: SD to [facility], likely chronic vent facility + HD, pending SW placement. Dispo plans and SD plans discussed with daughter Cristal

## 2018-06-02 NOTE — PROGRESS NOTE ADULT - SUBJECTIVE AND OBJECTIVE BOX
Patient is a 63y old  Male who presents with a chief complaint of Septic Shock due to bilateral cellulitis - course complicated by renal failure (on hemodialysis), stroke, and respiratory failure. (2018 14:51)      INTERVAL HPI/OVERNIGHT EVENTS:    Pt. seen and examined at 11AM during dialysis  No complaints elicited  +BM later in the day    Review of Systems: 12 point review of systems otherwise negative    MEDICATIONS  (STANDING):  acetaminophen    Suspension. 650 milliGRAM(s) Oral every 8 hours  acetylcysteine 20% Inhalation 4 milliLiter(s) Inhalation two times a day  aspirin  chewable 81 milliGRAM(s) Oral daily  atorvastatin 80 milliGRAM(s) Oral at bedtime  calcitriol  Solution 0.25 MICROGram(s) Oral daily  collagenase Ointment 1 Application(s) Topical daily  dextrose 5%. 1000 milliLiter(s) (50 mL/Hr) IV Continuous <Continuous>  dextrose 50% Injectable 12.5 Gram(s) IV Push once  dextrose 50% Injectable 25 Gram(s) IV Push once  dextrose 50% Injectable 25 Gram(s) IV Push once  escitalopram 5 milliGRAM(s) Oral daily  folic acid 1 milliGRAM(s) Oral daily  hydrocortisone 10 milliGRAM(s) Oral every 12 hours  insulin regular  human corrective regimen sliding scale   SubCutaneous every 6 hours  insulin regular  human recombinant 13 Unit(s) SubCutaneous every 6 hours  levETIRAcetam  Solution 500 milliGRAM(s) Oral every 24 hours  metoclopramide 5 milliGRAM(s) Oral every 12 hours  midodrine 10 milliGRAM(s) Oral every 8 hours  modafinil 100 milliGRAM(s) Oral daily  multivitamin 1 Tablet(s) Oral daily    MEDICATIONS  (PRN):  dextrose Gel 1 Dose(s) Oral once PRN Blood Glucose LESS THAN 70 milliGRAM(s)/deciliter  glucagon  Injectable 1 milliGRAM(s) IntraMuscular once PRN Glucose LESS THAN 70 milligrams/deciliter      Allergies    No Known Allergies    Intolerances          Vital Signs Last 24 Hrs  T(C): 36.9 (2018 16:39), Max: 37.2 (2018 08:47)  T(F): 98.4 (2018 16:39), Max: 98.9 (2018 08:47)  HR: 109 (2018 16:39) (12 - 109)  BP: 110/77 (2018 16:39) (94/62 - 122/80)  BP(mean): --  RR: 20 (2018 16:39) (17 - 23)  SpO2: 99% (2018 16:39) (99% - 100%)  CAPILLARY BLOOD GLUCOSE      POCT Blood Glucose.: 47 mg/dL (2018 17:52)  POCT Blood Glucose.: 125 mg/dL (2018 11:54)  POCT Blood Glucose.: 305 mg/dL (2018 09:18)  POCT Blood Glucose.: 301 mg/dL (2018 06:03)  POCT Blood Glucose.: 246 mg/dL (2018 03:50)  POCT Blood Glucose.: 139 mg/dL (2018 23:47)  POCT Blood Glucose.: 131 mg/dL (2018 21:56)       @ 07:01  -  -02 @ 07:00  --------------------------------------------------------  IN: 744 mL / OUT: 0 mL / NET: 744 mL        Physical Exam:  (at 11AM)  Daily     Daily Weight in k.4 (2018 12:45)  General:  ill but comfortable-appearing in NAD  HEENT:  +trach to vent  Abdomen:  +PEG  Skin:  WWP  Neuro:  alert    LABS:                        8.5    20.7  )-----------( 195      ( 2018 13:11 )             29.2         138  |  97  |  25<H>  ----------------------------<  239<H>  4.4   |  27  |  2.15<H>    Ca    8.8      2018 07:27      PT/INR - ( 2018 13:11 )   PT: 29.3 sec;   INR: 2.59          PTT - ( 2018 13:11 )  PTT:46.7 sec        RADIOLOGY & ADDITIONAL TESTS:    ---------------------------------------------------------------------------  I personally reviewed: [  ]EKG   [  ]CXR    [  ] CT    [  ]Other  ---------------------------------------------------------------------------  PLEASE CHECK WHEN PRESENT:     [  ]Heart Failure     [  ] Acute     [  ] Acute on Chronic     [  ] Chronic  -------------------------------------------------------------------     [  ]Diastolic [HFpEF]     [  ]Systolic [HFrEF]     [  ]Combined [HFpEF & HFrEF]     [  ]Other:  -------------------------------------------------------------------  [  ]GILMER     [  ]ATN     [  ]Reneal Medullary Necrosis     [  ]Renal Cortical Necrosis     [  ]Other Pathological Lesions:    [  ]CKD 1  [  ]CKD 2  [  ]CKD 3  [  ]CKD 4  [  ]CKD 5  [  ]Other  -------------------------------------------------------------------  [  ]Other/Unspecified:    --------------------------------------------------------------------    Abdominal Nutritional Status  [  ]Malnutrition: See Nutrition Note  [  ]Cachexia  [  ]Other:   [  ]Supplement Ordered:  [  ]Morbid Obesity (BMI >=40]

## 2018-06-02 NOTE — PROGRESS NOTE ADULT - SUBJECTIVE AND OBJECTIVE BOX
OVERNIGHT EVENTS: ELSIE    SUBJECTIVE / INTERVAL HPI: Patient seen and examined at bedside.     VITAL SIGNS:  Vital Signs Last 24 Hrs  T(C): 36.8 (02 Jun 2018 05:17), Max: 36.8 (01 Jun 2018 09:16)  T(F): 98.2 (02 Jun 2018 05:17), Max: 98.3 (01 Jun 2018 09:16)  HR: 106 (02 Jun 2018 05:29) (72 - 106)  BP: 101/72 (02 Jun 2018 05:17) (92/63 - 122/80)  BP(mean): --  RR: 20 (02 Jun 2018 05:29) (16 - 23)  SpO2: 100% (02 Jun 2018 05:29) (98% - 100%)    PHYSICAL EXAM:    General: WDWN  HEENT: NC/AT; PERRL, anicteric sclera; MMM  Neck: supple  Cardiovascular: +S1/S2; RRR  Respiratory: CTA B/L; no W/R/R  Gastrointestinal: soft, NT/ND; +BSx4  Extremities: WWP; no edema, clubbing or cyanosis  Vascular: 2+ radial, DP/PT pulses B/L  Neurological: AAOx3; no focal deficits    MEDICATIONS:  MEDICATIONS  (STANDING):  acetaminophen    Suspension. 650 milliGRAM(s) Oral every 8 hours  acetylcysteine 20% Inhalation 4 milliLiter(s) Inhalation two times a day  aspirin  chewable 81 milliGRAM(s) Oral daily  atorvastatin 80 milliGRAM(s) Oral at bedtime  calcitriol  Solution 0.25 MICROGram(s) Oral daily  collagenase Ointment 1 Application(s) Topical daily  Dakins Solution - Full Strength 1 Application(s) Topical two times a day  dextrose 5%. 1000 milliLiter(s) (50 mL/Hr) IV Continuous <Continuous>  dextrose 50% Injectable 12.5 Gram(s) IV Push once  dextrose 50% Injectable 25 Gram(s) IV Push once  dextrose 50% Injectable 25 Gram(s) IV Push once  doxercalciferol Injectable 2 MICROGram(s) IV Push once  epoetin amy Injectable 7000 Unit(s) IV Push once  escitalopram 5 milliGRAM(s) Oral daily  folic acid 1 milliGRAM(s) Oral daily  hydrocortisone 10 milliGRAM(s) Oral every 12 hours  insulin regular  human corrective regimen sliding scale   SubCutaneous every 6 hours  insulin regular  human recombinant 13 Unit(s) SubCutaneous every 6 hours  levETIRAcetam  Solution 500 milliGRAM(s) Oral every 24 hours  metoclopramide 5 milliGRAM(s) Oral every 12 hours  midodrine 10 milliGRAM(s) Oral every 8 hours  modafinil 100 milliGRAM(s) Oral daily  multivitamin 1 Tablet(s) Oral daily    MEDICATIONS  (PRN):  dextrose Gel 1 Dose(s) Oral once PRN Blood Glucose LESS THAN 70 milliGRAM(s)/deciliter  glucagon  Injectable 1 milliGRAM(s) IntraMuscular once PRN Glucose LESS THAN 70 milligrams/deciliter      ALLERGIES:  Allergies    No Known Allergies    Intolerances        LABS:                        8.5    17.6  )-----------( 210      ( 01 Jun 2018 07:27 )             29.3     06-01    138  |  97  |  25<H>  ----------------------------<  239<H>  4.4   |  27  |  2.15<H>    Ca    8.8      01 Jun 2018 07:27      PT/INR - ( 01 Jun 2018 07:27 )   PT: 46.2 sec;   INR: 4.05              CAPILLARY BLOOD GLUCOSE      POCT Blood Glucose.: 301 mg/dL (02 Jun 2018 06:03)      RADIOLOGY & ADDITIONAL TESTS: Reviewed.    ASSESSMENT:    PLAN: OVERNIGHT EVENTS: ELSIE    SUBJECTIVE / INTERVAL HPI: Patient seen and examined at bedside. No medical complaints this AM    VITAL SIGNS:  Vital Signs Last 24 Hrs  T(C): 36.8 (02 Jun 2018 05:17), Max: 36.8 (01 Jun 2018 09:16)  T(F): 98.2 (02 Jun 2018 05:17), Max: 98.3 (01 Jun 2018 09:16)  HR: 106 (02 Jun 2018 05:29) (72 - 106)  BP: 101/72 (02 Jun 2018 05:17) (92/63 - 122/80)  BP(mean): --  RR: 20 (02 Jun 2018 05:29) (16 - 23)  SpO2: 100% (02 Jun 2018 05:29) (98% - 100%)    PHYSICAL EXAM:    Constitutional: Patient lying in bed, no acute distress. Trach in situ, Ventilator Dependent.   HEENT: PERRLA  Neck: No LAD, No JVD  Respiratory: Normal air entry, no wheeze or crackles on auscultation.   Cardiovascular: S1 and S2 present - no additional abnormal sounds or murmurs. Normal rhythm and rate of pulse.   Gastrointestinal: BS+, soft, slightly distended and tympanic to percussion.   Extremities: No peripheral edema  Vascular: 2+ peripheral pulses  Neurological: A/O x 3, CN V-XII grossly intact.  Psychiatric: Normal mood, normal affect  Musculoskeletal: 5/5 strength b/l upper and lower extremities  Skin: B/l venous stasis changes present.     MEDICATIONS:  MEDICATIONS  (STANDING):  acetaminophen    Suspension. 650 milliGRAM(s) Oral every 8 hours  acetylcysteine 20% Inhalation 4 milliLiter(s) Inhalation two times a day  aspirin  chewable 81 milliGRAM(s) Oral daily  atorvastatin 80 milliGRAM(s) Oral at bedtime  calcitriol  Solution 0.25 MICROGram(s) Oral daily  collagenase Ointment 1 Application(s) Topical daily  Dakins Solution - Full Strength 1 Application(s) Topical two times a day  dextrose 5%. 1000 milliLiter(s) (50 mL/Hr) IV Continuous <Continuous>  dextrose 50% Injectable 12.5 Gram(s) IV Push once  dextrose 50% Injectable 25 Gram(s) IV Push once  dextrose 50% Injectable 25 Gram(s) IV Push once  doxercalciferol Injectable 2 MICROGram(s) IV Push once  epoetin amy Injectable 7000 Unit(s) IV Push once  escitalopram 5 milliGRAM(s) Oral daily  folic acid 1 milliGRAM(s) Oral daily  hydrocortisone 10 milliGRAM(s) Oral every 12 hours  insulin regular  human corrective regimen sliding scale   SubCutaneous every 6 hours  insulin regular  human recombinant 13 Unit(s) SubCutaneous every 6 hours  levETIRAcetam  Solution 500 milliGRAM(s) Oral every 24 hours  metoclopramide 5 milliGRAM(s) Oral every 12 hours  midodrine 10 milliGRAM(s) Oral every 8 hours  modafinil 100 milliGRAM(s) Oral daily  multivitamin 1 Tablet(s) Oral daily    MEDICATIONS  (PRN):  dextrose Gel 1 Dose(s) Oral once PRN Blood Glucose LESS THAN 70 milliGRAM(s)/deciliter  glucagon  Injectable 1 milliGRAM(s) IntraMuscular once PRN Glucose LESS THAN 70 milligrams/deciliter      ALLERGIES:  Allergies    No Known Allergies    Intolerances        LABS:                        8.5    17.6  )-----------( 210      ( 01 Jun 2018 07:27 )             29.3     06-01    138  |  97  |  25<H>  ----------------------------<  239<H>  4.4   |  27  |  2.15<H>    Ca    8.8      01 Jun 2018 07:27      PT/INR - ( 01 Jun 2018 07:27 )   PT: 46.2 sec;   INR: 4.05              CAPILLARY BLOOD GLUCOSE      POCT Blood Glucose.: 301 mg/dL (02 Jun 2018 06:03)      RADIOLOGY & ADDITIONAL TESTS: Reviewed.    ASSESSMENT:    PLAN:

## 2018-06-02 NOTE — PROGRESS NOTE ADULT - PROBLEM SELECTOR PLAN 1
Acute respiratory failure in setting of septic and cardiogenic shock, with trach placed on 4/5/18 for persistent, now chronic respiratory failure. CXR showing effusions. On daily HD.   - Continues to tolerate HD w/o albumin (cannot go to LTAC if req. albumin)  - Intermittent tachypnea likely a component of pain, Dilaudid 0.25mg IV PRN  - ENT to replace trach before patient is accepted to [facility]     #Sacral Decubitus Ulcer   - s/p 7 days IV Zosyn   - Continue wound care

## 2018-06-02 NOTE — PROVIDER CONTACT NOTE (OTHER) - ASSESSMENT
Patient diaphoretic with elevated RR. BM X 2 noted. On enteral feeds Jevity 1.5 @ 62cc/hr. No residuals noted. Patient suctioned as needed, RR still to be above normal range.

## 2018-06-02 NOTE — PROGRESS NOTE ADULT - PROBLEM SELECTOR PLAN 6
Hx of Afib and LV thrombus on Coumadin prior to admission. Most recent TTE with [facility] shows no LV thrombus currently.   - Not currently on rate control as HR has remained stable but has received Lopressor pushes for HR goal <110  - dose Coumadin based on INR daily (currently thereapeutic on 2 to 2.5mg dosings)     #LV thrombus  LV thrombus noted on RUKHSANA on 4/10. Remains therapeutic on warfarin.  - Dose Coumadin based on INR Hx of Afib and LV thrombus on Coumadin prior to admission. Most recent TTE with [facility] shows no LV thrombus currently.   - Not currently on rate control as HR has remained stable but has received Lopressor pushes for HR goal <110  - dose Coumadin based on INR daily INR 3.10 this AM - will hold dose tonight    #LV thrombus  LV thrombus noted on RUKHSANA on 4/10. Remains therapeutic on warfarin.  - Dose Coumadin based on INR

## 2018-06-02 NOTE — PROGRESS NOTE ADULT - SUBJECTIVE AND OBJECTIVE BOX
Patient was seen and evaluated on dialysis.   HR: 104 (06-02-18 @ 08:50)  BP: 103/65 (06-02-18 @ 08:47)  Wt(kg): --  Trach/vent  awake- not communicative  chest- occ rhonchi and reduced BS- no rales  CV- RRR  Abd soft  No LE edema  06-01    138  |  97  |  25<H>  ----------------------------<  239<H>  4.4   |  27  |  2.15<H>    Ca    8.8      01 Jun 2018 07:27      Continue dialysis:   Dialyzer:       revaclear 400  QB:  400 ml/min  K bath:  2K+  Goal UF: 2 kg  Duration: 3.5 hours  EPO ordered for anemia  Consider albumin to help full off additional fluid/pleural effusion-- to discuss  Continue full hemodialysis treatment as prescribed.

## 2018-06-03 LAB
ANION GAP SERPL CALC-SCNC: 12 MMOL/L — SIGNIFICANT CHANGE UP (ref 5–17)
APTT BLD: 41.1 SEC — HIGH (ref 27.5–37.4)
BUN SERPL-MCNC: 30 MG/DL — HIGH (ref 7–23)
CALCIUM SERPL-MCNC: 8.9 MG/DL — SIGNIFICANT CHANGE UP (ref 8.4–10.5)
CHLORIDE SERPL-SCNC: 98 MMOL/L — SIGNIFICANT CHANGE UP (ref 96–108)
CO2 SERPL-SCNC: 30 MMOL/L — SIGNIFICANT CHANGE UP (ref 22–31)
CREAT SERPL-MCNC: 2.41 MG/DL — HIGH (ref 0.5–1.3)
GLUCOSE BLDC GLUCOMTR-MCNC: 139 MG/DL — HIGH (ref 70–99)
GLUCOSE BLDC GLUCOMTR-MCNC: 157 MG/DL — HIGH (ref 70–99)
GLUCOSE BLDC GLUCOMTR-MCNC: 208 MG/DL — HIGH (ref 70–99)
GLUCOSE BLDC GLUCOMTR-MCNC: 226 MG/DL — HIGH (ref 70–99)
GLUCOSE BLDC GLUCOMTR-MCNC: 58 MG/DL — LOW (ref 70–99)
GLUCOSE SERPL-MCNC: 223 MG/DL — HIGH (ref 70–99)
HCT VFR BLD CALC: 28.9 % — LOW (ref 39–50)
HGB BLD-MCNC: 8.5 G/DL — LOW (ref 13–17)
INR BLD: 2.43 — HIGH (ref 0.88–1.16)
MAGNESIUM SERPL-MCNC: 2.1 MG/DL — SIGNIFICANT CHANGE UP (ref 1.6–2.6)
MCHC RBC-ENTMCNC: 26.1 PG — LOW (ref 27–34)
MCHC RBC-ENTMCNC: 29.4 G/DL — LOW (ref 32–36)
MCV RBC AUTO: 88.7 FL — SIGNIFICANT CHANGE UP (ref 80–100)
PLATELET # BLD AUTO: 223 K/UL — SIGNIFICANT CHANGE UP (ref 150–400)
POTASSIUM SERPL-MCNC: 4.5 MMOL/L — SIGNIFICANT CHANGE UP (ref 3.5–5.3)
POTASSIUM SERPL-SCNC: 4.5 MMOL/L — SIGNIFICANT CHANGE UP (ref 3.5–5.3)
PROTHROM AB SERPL-ACNC: 27.5 SEC — HIGH (ref 9.8–12.7)
RBC # BLD: 3.26 M/UL — LOW (ref 4.2–5.8)
RBC # FLD: 18.1 % — HIGH (ref 10.3–16.9)
SODIUM SERPL-SCNC: 140 MMOL/L — SIGNIFICANT CHANGE UP (ref 135–145)
WBC # BLD: 20.4 K/UL — HIGH (ref 3.8–10.5)
WBC # FLD AUTO: 20.4 K/UL — HIGH (ref 3.8–10.5)

## 2018-06-03 PROCEDURE — 99232 SBSQ HOSP IP/OBS MODERATE 35: CPT

## 2018-06-03 PROCEDURE — 99231 SBSQ HOSP IP/OBS SF/LOW 25: CPT | Mod: GC

## 2018-06-03 RX ORDER — WARFARIN SODIUM 2.5 MG/1
2.5 TABLET ORAL AT BEDTIME
Qty: 0 | Refills: 0 | Status: DISCONTINUED | OUTPATIENT
Start: 2018-06-03 | End: 2018-06-04

## 2018-06-03 RX ORDER — DEXTROSE 50 % IN WATER 50 %
25 SYRINGE (ML) INTRAVENOUS ONCE
Qty: 0 | Refills: 0 | Status: COMPLETED | OUTPATIENT
Start: 2018-06-03 | End: 2018-06-03

## 2018-06-03 RX ADMIN — Medication 5 MILLIGRAM(S): at 05:26

## 2018-06-03 RX ADMIN — MIDODRINE HYDROCHLORIDE 10 MILLIGRAM(S): 2.5 TABLET ORAL at 05:27

## 2018-06-03 RX ADMIN — INSULIN HUMAN 13 UNIT(S): 100 INJECTION, SOLUTION SUBCUTANEOUS at 06:42

## 2018-06-03 RX ADMIN — WARFARIN SODIUM 2.5 MILLIGRAM(S): 2.5 TABLET ORAL at 21:33

## 2018-06-03 RX ADMIN — Medication 4 MILLILITER(S): at 05:27

## 2018-06-03 RX ADMIN — Medication 10 MILLIGRAM(S): at 21:34

## 2018-06-03 RX ADMIN — Medication 4 MILLILITER(S): at 17:32

## 2018-06-03 RX ADMIN — Medication 1 TABLET(S): at 13:44

## 2018-06-03 RX ADMIN — Medication 5 MILLIGRAM(S): at 17:32

## 2018-06-03 RX ADMIN — Medication 81 MILLIGRAM(S): at 13:44

## 2018-06-03 RX ADMIN — MODAFINIL 100 MILLIGRAM(S): 200 TABLET ORAL at 13:44

## 2018-06-03 RX ADMIN — Medication 650 MILLIGRAM(S): at 05:26

## 2018-06-03 RX ADMIN — ESCITALOPRAM OXALATE 5 MILLIGRAM(S): 10 TABLET, FILM COATED ORAL at 13:49

## 2018-06-03 RX ADMIN — MIDODRINE HYDROCHLORIDE 10 MILLIGRAM(S): 2.5 TABLET ORAL at 13:45

## 2018-06-03 RX ADMIN — Medication 10 MILLIGRAM(S): at 12:48

## 2018-06-03 RX ADMIN — INSULIN HUMAN 13 UNIT(S): 100 INJECTION, SOLUTION SUBCUTANEOUS at 12:51

## 2018-06-03 RX ADMIN — Medication 25 GRAM(S): at 18:14

## 2018-06-03 RX ADMIN — Medication 650 MILLIGRAM(S): at 22:03

## 2018-06-03 RX ADMIN — INSULIN HUMAN 4: 100 INJECTION, SOLUTION SUBCUTANEOUS at 06:42

## 2018-06-03 RX ADMIN — ATORVASTATIN CALCIUM 80 MILLIGRAM(S): 80 TABLET, FILM COATED ORAL at 21:33

## 2018-06-03 RX ADMIN — Medication 650 MILLIGRAM(S): at 21:33

## 2018-06-03 RX ADMIN — Medication 650 MILLIGRAM(S): at 13:48

## 2018-06-03 RX ADMIN — MIDODRINE HYDROCHLORIDE 10 MILLIGRAM(S): 2.5 TABLET ORAL at 17:32

## 2018-06-03 RX ADMIN — Medication 1 MILLIGRAM(S): at 13:44

## 2018-06-03 RX ADMIN — CALCITRIOL 0.25 MICROGRAM(S): 0.5 CAPSULE ORAL at 13:46

## 2018-06-03 RX ADMIN — Medication 650 MILLIGRAM(S): at 14:40

## 2018-06-03 RX ADMIN — Medication 1 APPLICATION(S): at 13:49

## 2018-06-03 RX ADMIN — LEVETIRACETAM 500 MILLIGRAM(S): 250 TABLET, FILM COATED ORAL at 13:51

## 2018-06-03 NOTE — PROGRESS NOTE ADULT - ASSESSMENT
Patient is a 63 year old male  now dialysis dependent h/o HFrEF 10-15% due to ischemic cardiomyopathy, s/p AICD, ?atrial fibrillation, hypertension, diabetes mellitus type 2, gout, and CKD  with GILMER (ATN no recovery)

## 2018-06-03 NOTE — PROGRESS NOTE ADULT - SUBJECTIVE AND OBJECTIVE BOX
Patient is a 63y old  Male who presents with a chief complaint of Septic Shock due to bilateral cellulitis - course complicated by renal failure (on hemodialysis), stroke, and respiratory failure. (01 Jun 2018 14:51)      INTERVAL HPI/OVERNIGHT EVENTS:    Pt. seen and examined at 11:40AM  No complaints elicited     Review of Systems: 12 point review of systems otherwise negative    MEDICATIONS  (STANDING):  acetaminophen    Suspension. 650 milliGRAM(s) Oral every 8 hours  acetylcysteine 20% Inhalation 4 milliLiter(s) Inhalation two times a day  aspirin  chewable 81 milliGRAM(s) Oral daily  atorvastatin 80 milliGRAM(s) Oral at bedtime  calcitriol  Solution 0.25 MICROGram(s) Oral daily  collagenase Ointment 1 Application(s) Topical daily  dextrose 5%. 1000 milliLiter(s) (50 mL/Hr) IV Continuous <Continuous>  dextrose 50% Injectable 12.5 Gram(s) IV Push once  dextrose 50% Injectable 25 Gram(s) IV Push once  dextrose 50% Injectable 25 Gram(s) IV Push once  escitalopram 5 milliGRAM(s) Oral daily  folic acid 1 milliGRAM(s) Oral daily  hydrocortisone 10 milliGRAM(s) Oral every 12 hours  insulin regular  human corrective regimen sliding scale   SubCutaneous every 6 hours  insulin regular  human recombinant 13 Unit(s) SubCutaneous every 6 hours  levETIRAcetam  Solution 500 milliGRAM(s) Oral every 24 hours  metoclopramide 5 milliGRAM(s) Oral every 12 hours  midodrine 10 milliGRAM(s) Oral every 8 hours  modafinil 100 milliGRAM(s) Oral daily  multivitamin 1 Tablet(s) Oral daily  warfarin 2.5 milliGRAM(s) Oral at bedtime    MEDICATIONS  (PRN):  dextrose Gel 1 Dose(s) Oral once PRN Blood Glucose LESS THAN 70 milliGRAM(s)/deciliter  glucagon  Injectable 1 milliGRAM(s) IntraMuscular once PRN Glucose LESS THAN 70 milligrams/deciliter      Allergies    No Known Allergies    Intolerances          Vital Signs Last 24 Hrs  T(C): 36.8 (03 Jun 2018 09:06), Max: 37.9 (02 Jun 2018 18:51)  T(F): 98.2 (03 Jun 2018 09:06), Max: 100.3 (02 Jun 2018 18:51)  HR: 90 (03 Jun 2018 09:06) (90 - 123)  BP: 106/62 (03 Jun 2018 09:06) (93/63 - 111/76)  BP(mean): --  RR: 14 (03 Jun 2018 09:06) (14 - 26)  SpO2: 100% (03 Jun 2018 09:06) (99% - 100%)  CAPILLARY BLOOD GLUCOSE      POCT Blood Glucose.: 139 mg/dL (03 Jun 2018 12:26)  POCT Blood Glucose.: 208 mg/dL (03 Jun 2018 06:25)  POCT Blood Glucose.: 194 mg/dL (02 Jun 2018 23:07)  POCT Blood Glucose.: 164 mg/dL (02 Jun 2018 21:58)  POCT Blood Glucose.: 97 mg/dL (02 Jun 2018 18:47)  POCT Blood Glucose.: 80 mg/dL (02 Jun 2018 18:30)  POCT Blood Glucose.: 47 mg/dL (02 Jun 2018 17:52)        Physical Exam:  (at 11:40AM)  Daily     Daily   General:  ill but comfortable-appearing in NAD  HEENT:  +trach to vent  Abdomen:  soft +PEG  Skin:  WWP  Neuro:  alert    LABS:                        8.5    20.4  )-----------( 223      ( 03 Jun 2018 08:03 )             28.9     06-03    140  |  98  |  30<H>  ----------------------------<  223<H>  4.5   |  30  |  2.41<H>    Ca    8.9      03 Jun 2018 08:03  Mg     2.1     06-03      PT/INR - ( 03 Jun 2018 08:03 )   PT: 27.5 sec;   INR: 2.43          PTT - ( 03 Jun 2018 08:03 )  PTT:41.1 sec        RADIOLOGY & ADDITIONAL TESTS:    ---------------------------------------------------------------------------  I personally reviewed: [  ]EKG   [  ]CXR    [  ] CT    [  ]Other  ---------------------------------------------------------------------------  PLEASE CHECK WHEN PRESENT:     [  ]Heart Failure     [  ] Acute     [  ] Acute on Chronic     [  ] Chronic  -------------------------------------------------------------------     [  ]Diastolic [HFpEF]     [  ]Systolic [HFrEF]     [  ]Combined [HFpEF & HFrEF]     [  ]Other:  -------------------------------------------------------------------  [  ]GILMER     [  ]ATN     [  ]Reneal Medullary Necrosis     [  ]Renal Cortical Necrosis     [  ]Other Pathological Lesions:    [  ]CKD 1  [  ]CKD 2  [  ]CKD 3  [  ]CKD 4  [  ]CKD 5  [  ]Other  -------------------------------------------------------------------  [  ]Other/Unspecified:    --------------------------------------------------------------------    Abdominal Nutritional Status  [  ]Malnutrition: See Nutrition Note  [  ]Cachexia  [  ]Other:   [  ]Supplement Ordered:  [  ]Morbid Obesity (BMI >=40]

## 2018-06-03 NOTE — PROGRESS NOTE ADULT - SUBJECTIVE AND OBJECTIVE BOX
Patient seen and examined at bedside.   tolerated HD well yesterday  NAD  not responsive      T(C): , Max: 37.9 (06-02-18 @ 18:51)  T(F): , Max: 100.3 (06-02-18 @ 18:51)  HR: 90 (06-03-18 @ 09:06)  BP: 106/62 (06-03-18 @ 09:06)  BP(mean): --  RR: 14 (06-03-18 @ 09:06)  SpO2: 100% (06-03-18 @ 09:06)  Wt(kg): --        acetaminophen    Suspension. 650 every 8 hours  acetylcysteine 20% Inhalation 4 two times a day  aspirin  chewable 81 daily  atorvastatin 80 at bedtime  calcitriol  Solution 0.25 daily  collagenase Ointment 1 daily  dextrose 5%. 1000 <Continuous>  dextrose 50% Injectable 12.5 once  dextrose 50% Injectable 25 once  dextrose 50% Injectable 25 once  escitalopram 5 daily  folic acid 1 daily  hydrocortisone 10 every 12 hours  insulin regular  human corrective regimen sliding scale  every 6 hours  insulin regular  human recombinant 13 every 6 hours  levETIRAcetam  Solution 500 every 24 hours  metoclopramide 5 every 12 hours  midodrine 10 every 8 hours  modafinil 100 daily  multivitamin 1 daily  warfarin 2.5 at bedtime    Allergies    No Known Allergies          PHYSICAL EXAM:  Constitutional:  No acute distress    Respiratory: Clear to auscultation   Cardiovascular: S1, S2.  Regular rate and rhythm.    Gastrointestinal: soft, PEG  Vasc/Extremities:  No lower extremity edema.    Skin: Warm. Dry.        LABS:                        8.5    20.4  )-----------( 223      ( 03 Jun 2018 08:03 )             28.9     06-03    140  |  98  |  30<H>  ----------------------------<  223<H>  4.5   |  30  |  2.41<H>    Ca    8.9      03 Jun 2018 08:03  Mg     2.1     06-03        PT/INR - ( 03 Jun 2018 08:03 )   PT: 27.5 sec;   INR: 2.43          PTT - ( 03 Jun 2018 08:03 )  PTT:41.1 sec          RADIOLOGY & ADDITIONAL STUDIES:

## 2018-06-04 LAB
ALBUMIN SERPL ELPH-MCNC: 2.4 G/DL — LOW (ref 3.3–5)
ALP SERPL-CCNC: 545 U/L — HIGH (ref 40–120)
ALT FLD-CCNC: 78 U/L — HIGH (ref 10–45)
ANION GAP SERPL CALC-SCNC: 16 MMOL/L — SIGNIFICANT CHANGE UP (ref 5–17)
ANION GAP SERPL CALC-SCNC: 18 MMOL/L — HIGH (ref 5–17)
APPEARANCE UR: ABNORMAL
APTT BLD: 41 SEC — HIGH (ref 27.5–37.4)
AST SERPL-CCNC: 56 U/L — HIGH (ref 10–40)
BILIRUB SERPL-MCNC: 0.5 MG/DL — SIGNIFICANT CHANGE UP (ref 0.2–1.2)
BILIRUB UR-MCNC: ABNORMAL
BUN SERPL-MCNC: 44 MG/DL — HIGH (ref 7–23)
BUN SERPL-MCNC: 45 MG/DL — HIGH (ref 7–23)
CALCIUM SERPL-MCNC: 9.2 MG/DL — SIGNIFICANT CHANGE UP (ref 8.4–10.5)
CALCIUM SERPL-MCNC: 9.5 MG/DL — SIGNIFICANT CHANGE UP (ref 8.4–10.5)
CHLORIDE SERPL-SCNC: 94 MMOL/L — LOW (ref 96–108)
CHLORIDE SERPL-SCNC: 94 MMOL/L — LOW (ref 96–108)
CO2 SERPL-SCNC: 25 MMOL/L — SIGNIFICANT CHANGE UP (ref 22–31)
CO2 SERPL-SCNC: 27 MMOL/L — SIGNIFICANT CHANGE UP (ref 22–31)
COLOR SPEC: SIGNIFICANT CHANGE UP
CREAT SERPL-MCNC: 3.35 MG/DL — HIGH (ref 0.5–1.3)
CREAT SERPL-MCNC: 3.58 MG/DL — HIGH (ref 0.5–1.3)
DIFF PNL FLD: ABNORMAL
GLUCOSE BLDC GLUCOMTR-MCNC: 106 MG/DL — HIGH (ref 70–99)
GLUCOSE BLDC GLUCOMTR-MCNC: 122 MG/DL — HIGH (ref 70–99)
GLUCOSE BLDC GLUCOMTR-MCNC: 162 MG/DL — HIGH (ref 70–99)
GLUCOSE BLDC GLUCOMTR-MCNC: 184 MG/DL — HIGH (ref 70–99)
GLUCOSE BLDC GLUCOMTR-MCNC: 193 MG/DL — HIGH (ref 70–99)
GLUCOSE BLDC GLUCOMTR-MCNC: 232 MG/DL — HIGH (ref 70–99)
GLUCOSE BLDC GLUCOMTR-MCNC: 253 MG/DL — HIGH (ref 70–99)
GLUCOSE BLDC GLUCOMTR-MCNC: 70 MG/DL — SIGNIFICANT CHANGE UP (ref 70–99)
GLUCOSE SERPL-MCNC: 158 MG/DL — HIGH (ref 70–99)
GLUCOSE SERPL-MCNC: 67 MG/DL — LOW (ref 70–99)
GLUCOSE UR QL: NEGATIVE — SIGNIFICANT CHANGE UP
HCT VFR BLD CALC: 29.8 % — LOW (ref 39–50)
HGB BLD-MCNC: 8.6 G/DL — LOW (ref 13–17)
INR BLD: 3.01 — HIGH (ref 0.88–1.16)
KETONES UR-MCNC: NEGATIVE — SIGNIFICANT CHANGE UP
LEUKOCYTE ESTERASE UR-ACNC: ABNORMAL
MCHC RBC-ENTMCNC: 25.4 PG — LOW (ref 27–34)
MCHC RBC-ENTMCNC: 28.9 G/DL — LOW (ref 32–36)
MCV RBC AUTO: 88.2 FL — SIGNIFICANT CHANGE UP (ref 80–100)
NITRITE UR-MCNC: POSITIVE
PH UR: 7.5 — SIGNIFICANT CHANGE UP (ref 5–8)
PLATELET # BLD AUTO: 272 K/UL — SIGNIFICANT CHANGE UP (ref 150–400)
POTASSIUM SERPL-MCNC: 4.7 MMOL/L — SIGNIFICANT CHANGE UP (ref 3.5–5.3)
POTASSIUM SERPL-MCNC: 5 MMOL/L — SIGNIFICANT CHANGE UP (ref 3.5–5.3)
POTASSIUM SERPL-SCNC: 4.7 MMOL/L — SIGNIFICANT CHANGE UP (ref 3.5–5.3)
POTASSIUM SERPL-SCNC: 5 MMOL/L — SIGNIFICANT CHANGE UP (ref 3.5–5.3)
PROT SERPL-MCNC: 8.5 G/DL — HIGH (ref 6–8.3)
PROT UR-MCNC: >=300 MG/DL
PROTHROM AB SERPL-ACNC: 34.2 SEC — HIGH (ref 9.8–12.7)
RBC # BLD: 3.38 M/UL — LOW (ref 4.2–5.8)
RBC # FLD: 18.3 % — HIGH (ref 10.3–16.9)
SODIUM SERPL-SCNC: 137 MMOL/L — SIGNIFICANT CHANGE UP (ref 135–145)
SODIUM SERPL-SCNC: 137 MMOL/L — SIGNIFICANT CHANGE UP (ref 135–145)
SP GR SPEC: 1.02 — SIGNIFICANT CHANGE UP (ref 1–1.03)
UROBILINOGEN FLD QL: 0.2 E.U./DL — SIGNIFICANT CHANGE UP
WBC # BLD: 23.2 K/UL — HIGH (ref 3.8–10.5)
WBC # FLD AUTO: 23.2 K/UL — HIGH (ref 3.8–10.5)

## 2018-06-04 PROCEDURE — 99233 SBSQ HOSP IP/OBS HIGH 50: CPT | Mod: GC

## 2018-06-04 PROCEDURE — 99232 SBSQ HOSP IP/OBS MODERATE 35: CPT | Mod: GC

## 2018-06-04 PROCEDURE — 71045 X-RAY EXAM CHEST 1 VIEW: CPT | Mod: 26

## 2018-06-04 RX ORDER — DEXTROSE 50 % IN WATER 50 %
25 SYRINGE (ML) INTRAVENOUS ONCE
Qty: 0 | Refills: 0 | Status: COMPLETED | OUTPATIENT
Start: 2018-06-04 | End: 2018-06-04

## 2018-06-04 RX ORDER — INSULIN HUMAN 100 [IU]/ML
INJECTION, SOLUTION SUBCUTANEOUS
Qty: 0 | Refills: 0 | Status: DISCONTINUED | OUTPATIENT
Start: 2018-06-04 | End: 2018-07-01

## 2018-06-04 RX ORDER — INSULIN HUMAN 100 [IU]/ML
6 INJECTION, SOLUTION SUBCUTANEOUS
Qty: 0 | Refills: 0 | Status: DISCONTINUED | OUTPATIENT
Start: 2018-06-04 | End: 2018-06-06

## 2018-06-04 RX ORDER — INSULIN HUMAN 100 [IU]/ML
13 INJECTION, SOLUTION SUBCUTANEOUS
Qty: 0 | Refills: 0 | Status: DISCONTINUED | OUTPATIENT
Start: 2018-06-04 | End: 2018-06-06

## 2018-06-04 RX ORDER — PIPERACILLIN AND TAZOBACTAM 4; .5 G/20ML; G/20ML
2.25 INJECTION, POWDER, LYOPHILIZED, FOR SOLUTION INTRAVENOUS EVERY 8 HOURS
Qty: 0 | Refills: 0 | Status: DISCONTINUED | OUTPATIENT
Start: 2018-06-04 | End: 2018-06-21

## 2018-06-04 RX ADMIN — CALCITRIOL 0.25 MICROGRAM(S): 0.5 CAPSULE ORAL at 12:48

## 2018-06-04 RX ADMIN — Medication 5 MILLIGRAM(S): at 17:36

## 2018-06-04 RX ADMIN — LEVETIRACETAM 500 MILLIGRAM(S): 250 TABLET, FILM COATED ORAL at 13:25

## 2018-06-04 RX ADMIN — Medication 1 TABLET(S): at 12:47

## 2018-06-04 RX ADMIN — Medication 81 MILLIGRAM(S): at 12:47

## 2018-06-04 RX ADMIN — INSULIN HUMAN 6: 100 INJECTION, SOLUTION SUBCUTANEOUS at 00:39

## 2018-06-04 RX ADMIN — Medication 1 MILLIGRAM(S): at 12:47

## 2018-06-04 RX ADMIN — MIDODRINE HYDROCHLORIDE 10 MILLIGRAM(S): 2.5 TABLET ORAL at 12:47

## 2018-06-04 RX ADMIN — Medication 10 MILLIGRAM(S): at 10:51

## 2018-06-04 RX ADMIN — Medication 10 MILLIGRAM(S): at 22:40

## 2018-06-04 RX ADMIN — PIPERACILLIN AND TAZOBACTAM 200 GRAM(S): 4; .5 INJECTION, POWDER, LYOPHILIZED, FOR SOLUTION INTRAVENOUS at 13:55

## 2018-06-04 RX ADMIN — Medication 650 MILLIGRAM(S): at 05:24

## 2018-06-04 RX ADMIN — Medication 650 MILLIGRAM(S): at 10:51

## 2018-06-04 RX ADMIN — Medication 650 MILLIGRAM(S): at 23:44

## 2018-06-04 RX ADMIN — Medication 4 MILLILITER(S): at 17:36

## 2018-06-04 RX ADMIN — Medication 4 MILLILITER(S): at 05:24

## 2018-06-04 RX ADMIN — Medication 25 MILLILITER(S): at 17:43

## 2018-06-04 RX ADMIN — Medication 650 MILLIGRAM(S): at 11:30

## 2018-06-04 RX ADMIN — INSULIN HUMAN 13 UNIT(S): 100 INJECTION, SOLUTION SUBCUTANEOUS at 12:48

## 2018-06-04 RX ADMIN — INSULIN HUMAN 2: 100 INJECTION, SOLUTION SUBCUTANEOUS at 06:24

## 2018-06-04 RX ADMIN — Medication 5 MILLIGRAM(S): at 06:59

## 2018-06-04 RX ADMIN — ATORVASTATIN CALCIUM 80 MILLIGRAM(S): 80 TABLET, FILM COATED ORAL at 22:40

## 2018-06-04 RX ADMIN — MIDODRINE HYDROCHLORIDE 10 MILLIGRAM(S): 2.5 TABLET ORAL at 05:24

## 2018-06-04 RX ADMIN — MODAFINIL 100 MILLIGRAM(S): 200 TABLET ORAL at 12:48

## 2018-06-04 RX ADMIN — ESCITALOPRAM OXALATE 5 MILLIGRAM(S): 10 TABLET, FILM COATED ORAL at 12:47

## 2018-06-04 RX ADMIN — INSULIN HUMAN 13 UNIT(S): 100 INJECTION, SOLUTION SUBCUTANEOUS at 00:40

## 2018-06-04 RX ADMIN — Medication 1 APPLICATION(S): at 12:48

## 2018-06-04 RX ADMIN — MIDODRINE HYDROCHLORIDE 10 MILLIGRAM(S): 2.5 TABLET ORAL at 17:37

## 2018-06-04 RX ADMIN — PIPERACILLIN AND TAZOBACTAM 200 GRAM(S): 4; .5 INJECTION, POWDER, LYOPHILIZED, FOR SOLUTION INTRAVENOUS at 22:40

## 2018-06-04 RX ADMIN — INSULIN HUMAN 13 UNIT(S): 100 INJECTION, SOLUTION SUBCUTANEOUS at 06:24

## 2018-06-04 RX ADMIN — Medication 650 MILLIGRAM(S): at 22:40

## 2018-06-04 RX ADMIN — Medication 650 MILLIGRAM(S): at 05:54

## 2018-06-04 NOTE — PROGRESS NOTE ADULT - PROBLEM SELECTOR PLAN 10
VTE: Coumadin dosed nightly, with INR daily checks, goal 2-3  GI PPx: PPI  DNR- Made DNR, family wants escalation of care, pressors if needed.  F: No IVF in setting of HD  E: Replete electrolytes with caution in setting of HD  N: Jevity 1.5 goal rate 62cc/hr, per updated nutrition recs 5/21  Dispo: SD to [facility], likely chronic vent facility + HD, pending SW placement. Dispo plans and SD plans discussed with daughter Cristal

## 2018-06-04 NOTE — PROGRESS NOTE ADULT - SUBJECTIVE AND OBJECTIVE BOX
INTERVAL HPI/OVERNIGHT EVENTS: No fevers. Pyuria. Sacral wound has been getting contaminated by feces. Patient completely unable to independently reposition.     ROS: UTO      ANTIBIOTICS/RELEVANT:    MEDICATIONS  (STANDING):  acetaminophen    Suspension. 650 milliGRAM(s) Oral every 8 hours  acetylcysteine 20% Inhalation 4 milliLiter(s) Inhalation two times a day  aspirin  chewable 81 milliGRAM(s) Oral daily  atorvastatin 80 milliGRAM(s) Oral at bedtime  calcitriol  Solution 0.25 MICROGram(s) Oral daily  collagenase Ointment 1 Application(s) Topical daily  dextrose 5%. 1000 milliLiter(s) (50 mL/Hr) IV Continuous <Continuous>  dextrose 50% Injectable 12.5 Gram(s) IV Push once  dextrose 50% Injectable 25 Gram(s) IV Push once  dextrose 50% Injectable 25 Gram(s) IV Push once  escitalopram 5 milliGRAM(s) Oral daily  folic acid 1 milliGRAM(s) Oral daily  hydrocortisone 10 milliGRAM(s) Oral every 12 hours  insulin regular  human corrective regimen sliding scale   SubCutaneous every 6 hours  insulin regular  human recombinant 13 Unit(s) SubCutaneous every 6 hours  levETIRAcetam  Solution 500 milliGRAM(s) Oral every 24 hours  metoclopramide 5 milliGRAM(s) Oral every 12 hours  midodrine 10 milliGRAM(s) Oral every 8 hours  modafinil 100 milliGRAM(s) Oral daily  multivitamin 1 Tablet(s) Oral daily  piperacillin/tazobactam IVPB. 2.25 Gram(s) IV Intermittent every 8 hours    MEDICATIONS  (PRN):  dextrose Gel 1 Dose(s) Oral once PRN Blood Glucose LESS THAN 70 milliGRAM(s)/deciliter  glucagon  Injectable 1 milliGRAM(s) IntraMuscular once PRN Glucose LESS THAN 70 milligrams/deciliter        Vital Signs Last 24 Hrs  T(C): 36.9 (2018 16:27), Max: 37.9 (2018 10:19)  T(F): 98.4 (2018 16:27), Max: 100.3 (2018 10:19)  HR: 88 (2018 16:27) (85 - 98)  BP: 105/74 (2018 16:27) (99/65 - 105/74)  BP(mean): --  RR: 19 (2018 16:27) (15 - 21)  SpO2: 100% (2018 16:27) (98% - 100%)    PHYSICAL EXAM:  Constitutional: chronically ill-appearing  Eyes:MAXI, EOMI  Ear/Nose/Throat: no oral lesion, no sinus tenderness on percussion	  Neck:no JVD, no lymphadenopathy, supple  Respiratory: CTA morris  Cardiovascular: S1S2 RRR, no murmurs  Gastrointestinal:soft, (+) BS, no HSM  Extremities:no e/e/c; atrophy  Skin: deep sacral decubitus ulcer with fecal contamination  Vascular: DP Pulse:	right normal; left normal      LABS:                        8.6    23.2  )-----------( 272      ( 2018 12:55 )             29.8     06-04    137  |  94<L>  |  44<H>  ----------------------------<  158<H>  4.7   |  25  |  3.35<H>    Ca    9.5      2018 12:55  Mg     2.1     06-03      PT/INR - ( 2018 12:55 )   PT: 34.2 sec;   INR: 3.01          PTT - ( 2018 12:55 )  PTT:41.0 sec  Urinalysis Basic - ( 2018 11:47 )    Color: PINK / Appearance: Turbid / S.020 / pH: x  Gluc: x / Ketone: NEGATIVE  / Bili: Small / Urobili: 0.2 E.U./dL   Blood: x / Protein: >=300 mg/dL / Nitrite: POSITIVE   Leuk Esterase: Large / RBC: Many /HPF / WBC Loaded /HPF   Sq Epi: x / Non Sq Epi: 0-5 /HPF / Bacteria: Many /HPF        MICROBIOLOGY: Culture - Other (18 @ 11:57)    -  Tobramycin: R >8    -  Tobramycin: S <=4    -  Piperacillin/Tazobactam: S <=16    -  Piperacillin/Tazobactam: S <=16    -  Tobramycin: S <=4    -  Trimethoprim/Sulfamethoxazole: R >2/38    -  Trimethoprim/Sulfamethoxazole: S <=2/38    -  Vancomycin: R >16    -  Vancomycin: S 4    -  Ciprofloxacin: S <=1    -  Ciprofloxacin: S <=1    -  Ertapenem: I <=1    -  Gentamicin: R >8    -  Gentamicin: S <=4    -  Gentamicin: S <=4    -  Imipenem: S <=1    -  Imipenem: S <=1    -  Levofloxacin: S <=2    -  Meropenem: S <=1    -  Cefotaxime: S <=2    -  Cefoxitin: S <=8    -  Tigecycline: S <=2    -  Piperacillin/Tazobactam: S <=16    Gram Stain:   Few Gram positive cocci in pairs  Few Gram Negative Rods  Moderate White blood cells    -  Amikacin: S <=16    -  Amikacin: S <=16    -  Ampicillin: R >16 These ampicillin results predict results for amoxicillin    -  Ampicillin: S <=8 These ampicillin results predict results for amoxicillin    -  Ampicillin: S These ampicillin results predict results for amoxicillin    -  Ampicillin: R >8    -  Ampicillin/Sulbactam: R >16/8    -  Ampicillin/Sulbactam: S <=8/4    -  Aztreonam: S 8    -  Aztreonam: S <=4    -  Cefepime: S <=4    -  Cefazolin: R >16    -  Cefazolin: S <=8    -  Ceftazidime: S 4    -  Ceftazidime: S <=1    -  Ciprofloxacin: S <=1    -  Ceftriaxone: R 8 Enterobacter, Citrobacter, and Serratia may develop resistance during prolonged therapy    -  Ceftriaxone: S <=1 Enterobacter, Citrobacter, and Serratia may develop resistance during prolonged therapy    -  Ceftriaxone: S <=1 Enterobacter, Citrobacter, and Serratia may develop resistance during prolonged therapy    -  Cefuroxime: S <=4    Specimen Source: .Other sacral decub    Culture Results:   Numerous Gram Negative Rods  (three types) including:   Numerous Pseudomonas aeruginosa  Moderate Enterococcus faecium (vancomycin resistant) Result called to and  read back bySheldon Anderson RN  2018 12:05:52 Susceptibility to follow.  More thanthree types of organisms recovered may indicate colonization  and/or contamination.  Please call Microbiology immediately if identification and/or  suceptibility is indicated.    Organism Identification: Escherichia coli  Klebsiella pneumoniae  Pseudomonas aeruginosa  Escherichia coli  Enterococcus faecalis  Enterococcus faecium (vancomycin resistant)    Organism: Pseudomonas aeruginosa    Organism: Escherichia coli    Organism: Enterococcus faecalis    Organism: Enterococcus faecium (vancomycin resistant)    Organism: Escherichia coli    Organism: Klebsiella pneumoniae    Method Type: YONATHAN    Method Type: YONATHAN    Method Type: YONATHAN    Method Type: YONATHAN    Method Type: YONATHAN    Method Type: YONATHAN        RADIOLOGY & ADDITIONAL STUDIES: reviewed

## 2018-06-04 NOTE — PROGRESS NOTE ADULT - PROBLEM SELECTOR PLAN 1
- Patient treated for Sacral Decub growing Pseudomonas for 7 days with IV Zosyn - since stopping, persistently elevating leukocytosis  - Low grade temp rectally 100.3 F  - Will persist with full skin exam - new purulence in ulcer?   - Repeat Blood Cx  - CXR for the most part unchanged.     #Fungemia  RESOLVED. Noted to have Candida Tropicalis growing in blood cultures and completed fluconazole course.

## 2018-06-04 NOTE — PROGRESS NOTE ADULT - PROBLEM SELECTOR PLAN 2
Acute respiratory failure in setting of septic and cardiogenic shock, with trach placed on 4/5/18 for persistent, now chronic respiratory failure. CXR showing effusions. On daily HD.   - Continues to tolerate HD w/o albumin (cannot go to LTAC if req. albumin)  - Intermittent tachypnea likely a component of pain, Dilaudid 0.25mg IV PRN  - ENT to replace trach before patient is accepted to [facility]

## 2018-06-04 NOTE — PROGRESS NOTE ADULT - PROBLEM SELECTOR PLAN 7
Hx of Afib and LV thrombus on Coumadin prior to admission. Most recent TTE with [facility] shows no LV thrombus currently.   - Not currently on rate control as HR has remained stable but has received Lopressor pushes for HR goal <110  - dose Coumadin based on INR daily INR 3.10 this AM - will hold dose tonight    #LV thrombus  LV thrombus noted on RUKHSANA on 4/10. Remains therapeutic on warfarin.  - Dose Coumadin based on INR

## 2018-06-04 NOTE — PROGRESS NOTE ADULT - ATTENDING COMMENTS
UTI: F/U Urine cx. Restarted on Zosyn. F/U ID recs. ESR/CRP. Will consider hip imaging to r/o osteo.

## 2018-06-04 NOTE — PROGRESS NOTE ADULT - PROBLEM SELECTOR PLAN 4
Patient with severe systolic CHF with Fluid overload   Fluid restriction to <1L/day with goal fluid balance to be net negative  Daily weight  Strict I/o  Optimize CHF treatment per cardiology/CHF team

## 2018-06-04 NOTE — PROGRESS NOTE ADULT - PROBLEM SELECTOR PLAN 3
DM2 on Januvia at home. HbA1C 8.6. Difficulty controlling glucose levels with multiple episodes of hypoglycemia and hyperglycemia. Symptomatically hypoglycemic with isolated evening F/S 50-60s.   - C/w regular insulin 13u q 6 hrs  - ISS

## 2018-06-04 NOTE — PROGRESS NOTE ADULT - ASSESSMENT
62 yo male with prolonged hospital course, C. 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  - f/u bcx ngtd  - f/u ucx  - check baseline ESR, CRP  - consider pelvic/hip MRI to evaluate for underlying osteomyelitis and abscess  - continue Zosyn 2.25g IV q8h  - f/u GOC

## 2018-06-04 NOTE — PROGRESS NOTE ADULT - SUBJECTIVE AND OBJECTIVE BOX
INTERVAL HPI/OVERNIGHT EVENTS:  No acute events over the weekend - however, noted that patient continues to have isolated periods of hypoglycemia (F/S 50-60s) at similar times on a daily basis. At these times, he is symptomatic - will become diaphoretic abd slightly less responsive - with improvement following administratio of D50. Patient also noted to have a worsening leukocytosis since stopping antibiotics. Given concern for persistent infection, rectal temp obtained - 100.3F. Given low grade fever, will obtain blood cultures and perform an full body skin examination. CXR appears relatively unchanged - perhaps slightly more dense R sided opacification, unchanged in size.      ROS is unobtainable.     VITAL SIGNS:  T(F): 100.3 (06-04-18 @ 10:19)  HR: 94 (06-04-18 @ 10:19)  BP: 103/79 (06-04-18 @ 10:19)  RR: 16 (06-04-18 @ 10:19)  SpO2: 100% (06-04-18 @ 10:19)  Wt(kg): --      I&O:  06-03-18 @ 07:01  -  06-04-18 @ 07:00  --------------------------------------------------------  IN: 894 mL / OUT: 0 mL / NET: 894 mL      PHYSICAL EXAM:  Constitutional: Patient lying in bed, no acute distress. Trach in situ, Ventilator Dependent.   HEENT: PERRLA  Neck: No LAD, No JVD  Respiratory: Normal air entry, no wheeze or crackles on auscultation.   Cardiovascular: S1 and S2 present - no additional abnormal sounds or murmurs. Normal rhythm and rate of pulse.   Gastrointestinal: BS+, soft, slightly distended and tympanic to percussion.   Extremities: No peripheral edema  Vascular: 2+ peripheral pulses  Neurological: A/O x 3, CN V-XII grossly intact.  Psychiatric: Normal mood, normal affect  Musculoskeletal: 5/5 strength b/l upper and lower extremities  Skin: B/l venous stasis changes present.     MEDICATIONS  (STANDING):  acetaminophen    Suspension. 650 milliGRAM(s) Oral every 8 hours  acetylcysteine 20% Inhalation 4 milliLiter(s) Inhalation two times a day  aspirin  chewable 81 milliGRAM(s) Oral daily  atorvastatin 80 milliGRAM(s) Oral at bedtime  calcitriol  Solution 0.25 MICROGram(s) Oral daily  collagenase Ointment 1 Application(s) Topical daily  dextrose 5%. 1000 milliLiter(s) (50 mL/Hr) IV Continuous <Continuous>  dextrose 50% Injectable 12.5 Gram(s) IV Push once  dextrose 50% Injectable 25 Gram(s) IV Push once  dextrose 50% Injectable 25 Gram(s) IV Push once  escitalopram 5 milliGRAM(s) Oral daily  folic acid 1 milliGRAM(s) Oral daily  hydrocortisone 10 milliGRAM(s) Oral every 12 hours  insulin regular  human corrective regimen sliding scale   SubCutaneous every 6 hours  insulin regular  human recombinant 13 Unit(s) SubCutaneous every 6 hours  levETIRAcetam  Solution 500 milliGRAM(s) Oral every 24 hours  metoclopramide 5 milliGRAM(s) Oral every 12 hours  midodrine 10 milliGRAM(s) Oral every 8 hours  modafinil 100 milliGRAM(s) Oral daily  multivitamin 1 Tablet(s) Oral daily  warfarin 2.5 milliGRAM(s) Oral at bedtime    MEDICATIONS  (PRN):  dextrose Gel 1 Dose(s) Oral once PRN Blood Glucose LESS THAN 70 milliGRAM(s)/deciliter  glucagon  Injectable 1 milliGRAM(s) IntraMuscular once PRN Glucose LESS THAN 70 milligrams/deciliter      Allergies    No Known Allergies    Intolerances      LABS:                        8.5    20.4  )-----------( 223      ( 03 Jun 2018 08:03 )             28.9     06-03    140  |  98  |  30<H>  ----------------------------<  223<H>  4.5   |  30  |  2.41<H>    Ca    8.9      03 Jun 2018 08:03  Mg     2.1     06-03      PT/INR - ( 03 Jun 2018 08:03 )   PT: 27.5 sec;   INR: 2.43          PTT - ( 03 Jun 2018 08:03 )  PTT:41.1 sec      RADIOLOGY & ADDITIONAL TESTS:

## 2018-06-04 NOTE — PROGRESS NOTE ADULT - SUBJECTIVE AND OBJECTIVE BOX
Patient seen and examined at bedside.  Patient is a 63 year old male with a history of HFrEF 10-15% due to ischemic cardiomyopathy, s/p AICD, ?atrial fibrillation, hypertension, diabetes mellitus type 2, gout, and CKD for whom nephrology was called for GILMER from ATN requiring hemodialysis. Patient is comfortable and in no acute distress. Patient was last dialyzed .     acetaminophen    Suspension. 650 milliGRAM(s) every 8 hours  acetylcysteine 20% Inhalation 4 milliLiter(s) two times a day  aspirin  chewable 81 milliGRAM(s) daily  atorvastatin 80 milliGRAM(s) at bedtime  calcitriol  Solution 0.25 MICROGram(s) daily  collagenase Ointment 1 Application(s) daily  dextrose 5%. 1000 milliLiter(s) <Continuous>  dextrose 50% Injectable 12.5 Gram(s) once  dextrose 50% Injectable 25 Gram(s) once  dextrose 50% Injectable 25 Gram(s) once  dextrose Gel 1 Dose(s) once PRN  escitalopram 5 milliGRAM(s) daily  folic acid 1 milliGRAM(s) daily  glucagon  Injectable 1 milliGRAM(s) once PRN  hydrocortisone 10 milliGRAM(s) every 12 hours  insulin regular  human corrective regimen sliding scale   every 6 hours  insulin regular  human recombinant 13 Unit(s) every 6 hours  levETIRAcetam  Solution 500 milliGRAM(s) every 24 hours  metoclopramide 5 milliGRAM(s) every 12 hours  midodrine 10 milliGRAM(s) every 8 hours  modafinil 100 milliGRAM(s) daily  multivitamin 1 Tablet(s) daily  piperacillin/tazobactam IVPB. 2.25 Gram(s) every 8 hours    Allergies    No Known Allergies    Intolerances    T(C): , Max: 37.9 (18 @ 10:19)  T(F): , Max: 100.3 (18 @ 10:19)  HR: 85 (18 @ 14:20)  BP: 103/79 (18 @ 10:19)  RR: 20 (18 @ 14:20)  SpO2: 99% (18 @ 14:20)    06-03 @ 07:01  -   @ 07:00  --------------------------------------------------------  IN:    Enteral Tube Flush: 150 mL    Jevity: 744 mL  Total IN: 894 mL    OUT:  Total OUT: 0 mL    Total NET: 894 mL    LABS:                        8.6    23.2  )-----------( 272      ( 2018 12:55 )             29.8     06-04    137  |  94<L>  |  44<H>  ----------------------------<  158<H>  4.7   |  25  |  3.35<H>    Ca    9.5      2018 12:55  Mg     2.1     06-03    PT/INR - ( 2018 12:55 )   PT: 34.2 sec;   INR: 3.01       PTT - ( 2018 12:55 )  PTT:41.0 sec  Urinalysis Basic - ( 2018 11:47 )    Color: PINK / Appearance: Turbid / S.020 / pH: x  Gluc: x / Ketone: NEGATIVE  / Bili: Small / Urobili: 0.2 E.U./dL   Blood: x / Protein: >=300 mg/dL / Nitrite: POSITIVE   Leuk Esterase: Large / RBC: Many /HPF / WBC Loaded /HPF   Sq Epi: x / Non Sq Epi: 0-5 /HPF / Bacteria: Many /HPF

## 2018-06-04 NOTE — PROGRESS NOTE ADULT - PROBLEM SELECTOR PLAN 1
Patient is a 63 year old male with acute renal failure from ischemic ATN now requiring hemodialysis. Patient was last dialyzed on 6/2    P - patient does not require emergent dialysis today as volume status is acceptable   Anticipate next dialysis on Tuesday 6/5

## 2018-06-05 LAB
ANION GAP SERPL CALC-SCNC: 14 MMOL/L — SIGNIFICANT CHANGE UP (ref 5–17)
BUN SERPL-MCNC: 55 MG/DL — HIGH (ref 7–23)
CALCIUM SERPL-MCNC: 9 MG/DL — SIGNIFICANT CHANGE UP (ref 8.4–10.5)
CHLORIDE SERPL-SCNC: 97 MMOL/L — SIGNIFICANT CHANGE UP (ref 96–108)
CO2 SERPL-SCNC: 27 MMOL/L — SIGNIFICANT CHANGE UP (ref 22–31)
CREAT SERPL-MCNC: 3.84 MG/DL — HIGH (ref 0.5–1.3)
CRP SERPL-MCNC: 21.77 MG/DL — HIGH (ref 0–0.4)
ERYTHROCYTE [SEDIMENTATION RATE] IN BLOOD: 98 MM/HR — HIGH
GLUCOSE BLDC GLUCOMTR-MCNC: 143 MG/DL — HIGH (ref 70–99)
GLUCOSE BLDC GLUCOMTR-MCNC: 159 MG/DL — HIGH (ref 70–99)
GLUCOSE BLDC GLUCOMTR-MCNC: 204 MG/DL — HIGH (ref 70–99)
GLUCOSE BLDC GLUCOMTR-MCNC: 215 MG/DL — HIGH (ref 70–99)
GLUCOSE BLDC GLUCOMTR-MCNC: 295 MG/DL — HIGH (ref 70–99)
GLUCOSE BLDC GLUCOMTR-MCNC: 329 MG/DL — HIGH (ref 70–99)
GLUCOSE SERPL-MCNC: 324 MG/DL — HIGH (ref 70–99)
HCT VFR BLD CALC: 27.4 % — LOW (ref 39–50)
HGB BLD-MCNC: 8 G/DL — LOW (ref 13–17)
INR BLD: 3.06 — HIGH (ref 0.88–1.16)
MAGNESIUM SERPL-MCNC: 2.4 MG/DL — SIGNIFICANT CHANGE UP (ref 1.6–2.6)
MCHC RBC-ENTMCNC: 25.8 PG — LOW (ref 27–34)
MCHC RBC-ENTMCNC: 29.2 G/DL — LOW (ref 32–36)
MCV RBC AUTO: 88.4 FL — SIGNIFICANT CHANGE UP (ref 80–100)
PLATELET # BLD AUTO: 261 K/UL — SIGNIFICANT CHANGE UP (ref 150–400)
POTASSIUM SERPL-MCNC: 5.1 MMOL/L — SIGNIFICANT CHANGE UP (ref 3.5–5.3)
POTASSIUM SERPL-SCNC: 5.1 MMOL/L — SIGNIFICANT CHANGE UP (ref 3.5–5.3)
PROTHROM AB SERPL-ACNC: 34.8 SEC — HIGH (ref 9.8–12.7)
RBC # BLD: 3.1 M/UL — LOW (ref 4.2–5.8)
RBC # FLD: 18.4 % — HIGH (ref 10.3–16.9)
SODIUM SERPL-SCNC: 138 MMOL/L — SIGNIFICANT CHANGE UP (ref 135–145)
WBC # BLD: 24.6 K/UL — HIGH (ref 3.8–10.5)
WBC # FLD AUTO: 24.6 K/UL — HIGH (ref 3.8–10.5)

## 2018-06-05 PROCEDURE — 99233 SBSQ HOSP IP/OBS HIGH 50: CPT | Mod: GC

## 2018-06-05 PROCEDURE — 99232 SBSQ HOSP IP/OBS MODERATE 35: CPT | Mod: GC

## 2018-06-05 PROCEDURE — 90937 HEMODIALYSIS REPEATED EVAL: CPT | Mod: GC

## 2018-06-05 RX ORDER — ERYTHROPOIETIN 10000 [IU]/ML
10000 INJECTION, SOLUTION INTRAVENOUS; SUBCUTANEOUS ONCE
Qty: 0 | Refills: 0 | Status: COMPLETED | OUTPATIENT
Start: 2018-06-05 | End: 2018-06-05

## 2018-06-05 RX ORDER — MODAFINIL 200 MG/1
100 TABLET ORAL DAILY
Qty: 0 | Refills: 0 | Status: DISCONTINUED | OUTPATIENT
Start: 2018-06-06 | End: 2018-06-12

## 2018-06-05 RX ORDER — ALBUMIN HUMAN 25 %
50 VIAL (ML) INTRAVENOUS
Qty: 0 | Refills: 0 | Status: COMPLETED | OUTPATIENT
Start: 2018-06-05 | End: 2018-06-05

## 2018-06-05 RX ORDER — DOXERCALCIFEROL 2.5 UG/1
2 CAPSULE ORAL ONCE
Qty: 0 | Refills: 0 | Status: COMPLETED | OUTPATIENT
Start: 2018-06-05 | End: 2018-06-05

## 2018-06-05 RX ADMIN — ESCITALOPRAM OXALATE 5 MILLIGRAM(S): 10 TABLET, FILM COATED ORAL at 10:22

## 2018-06-05 RX ADMIN — ATORVASTATIN CALCIUM 80 MILLIGRAM(S): 80 TABLET, FILM COATED ORAL at 22:21

## 2018-06-05 RX ADMIN — CALCITRIOL 0.25 MICROGRAM(S): 0.5 CAPSULE ORAL at 10:22

## 2018-06-05 RX ADMIN — Medication 50 MILLILITER(S): at 19:30

## 2018-06-05 RX ADMIN — Medication 650 MILLIGRAM(S): at 08:10

## 2018-06-05 RX ADMIN — Medication 650 MILLIGRAM(S): at 07:13

## 2018-06-05 RX ADMIN — Medication 10 MILLIGRAM(S): at 22:22

## 2018-06-05 RX ADMIN — LEVETIRACETAM 500 MILLIGRAM(S): 250 TABLET, FILM COATED ORAL at 10:25

## 2018-06-05 RX ADMIN — Medication 5 MILLIGRAM(S): at 06:48

## 2018-06-05 RX ADMIN — MIDODRINE HYDROCHLORIDE 10 MILLIGRAM(S): 2.5 TABLET ORAL at 22:22

## 2018-06-05 RX ADMIN — PIPERACILLIN AND TAZOBACTAM 200 GRAM(S): 4; .5 INJECTION, POWDER, LYOPHILIZED, FOR SOLUTION INTRAVENOUS at 06:50

## 2018-06-05 RX ADMIN — DOXERCALCIFEROL 2 MICROGRAM(S): 2.5 CAPSULE ORAL at 17:47

## 2018-06-05 RX ADMIN — INSULIN HUMAN 6 UNIT(S): 100 INJECTION, SOLUTION SUBCUTANEOUS at 12:44

## 2018-06-05 RX ADMIN — MODAFINIL 100 MILLIGRAM(S): 200 TABLET ORAL at 10:23

## 2018-06-05 RX ADMIN — PIPERACILLIN AND TAZOBACTAM 200 GRAM(S): 4; .5 INJECTION, POWDER, LYOPHILIZED, FOR SOLUTION INTRAVENOUS at 22:22

## 2018-06-05 RX ADMIN — Medication 50 MILLILITER(S): at 18:36

## 2018-06-05 RX ADMIN — INSULIN HUMAN 3: 100 INJECTION, SOLUTION SUBCUTANEOUS at 01:00

## 2018-06-05 RX ADMIN — Medication 650 MILLIGRAM(S): at 22:21

## 2018-06-05 RX ADMIN — Medication 81 MILLIGRAM(S): at 10:22

## 2018-06-05 RX ADMIN — Medication 4 MILLILITER(S): at 06:49

## 2018-06-05 RX ADMIN — MIDODRINE HYDROCHLORIDE 10 MILLIGRAM(S): 2.5 TABLET ORAL at 12:44

## 2018-06-05 RX ADMIN — Medication 1 MILLIGRAM(S): at 10:22

## 2018-06-05 RX ADMIN — INSULIN HUMAN 13 UNIT(S): 100 INJECTION, SOLUTION SUBCUTANEOUS at 07:28

## 2018-06-05 RX ADMIN — MIDODRINE HYDROCHLORIDE 10 MILLIGRAM(S): 2.5 TABLET ORAL at 06:50

## 2018-06-05 RX ADMIN — Medication 50 MILLILITER(S): at 17:47

## 2018-06-05 RX ADMIN — INSULIN HUMAN 4: 100 INJECTION, SOLUTION SUBCUTANEOUS at 07:27

## 2018-06-05 RX ADMIN — Medication 10 MILLIGRAM(S): at 10:19

## 2018-06-05 RX ADMIN — ERYTHROPOIETIN 10000 UNIT(S): 10000 INJECTION, SOLUTION INTRAVENOUS; SUBCUTANEOUS at 17:47

## 2018-06-05 RX ADMIN — Medication 1 TABLET(S): at 10:22

## 2018-06-05 RX ADMIN — Medication 1 APPLICATION(S): at 10:23

## 2018-06-05 RX ADMIN — INSULIN HUMAN 13 UNIT(S): 100 INJECTION, SOLUTION SUBCUTANEOUS at 01:00

## 2018-06-05 RX ADMIN — INSULIN HUMAN 2: 100 INJECTION, SOLUTION SUBCUTANEOUS at 12:44

## 2018-06-05 RX ADMIN — Medication 5 MILLIGRAM(S): at 22:21

## 2018-06-05 RX ADMIN — Medication 4 MILLILITER(S): at 18:43

## 2018-06-05 NOTE — PROGRESS NOTE ADULT - PROBLEM SELECTOR PLAN 9
After Visit Summary      Facility     Name Address Phone       [facility] [address]  [place] [address] [phone]            Patient Discharge Summary   5/23/2017    Zari Wang            Please bring this medication reconciliation form to your next doctor’s appointment(s). Please update the form if you stop taking any of these medications or you start taking any new medications including over the counter medications. Also please carry a copy of this form with you at all times in the event of an emergency.    A copy of these discharge instructions was reviewed with and given to the patient/patient representative/Guardian/Caregiver at discharge.          Admission Information     Date & Time Provider Department Dept. Phone    5/23/2017 TARYN Gilmore MD [facility] - Interventional Radiology [phone]      Allergies as of 5/23/2017     No Known Allergies           Discharge Medications           Your Medications       Start Taking     No Medications Reconciled      Continue These Medications Which Have Not Changed     ASPIRIN 81 MG TABLET Take 81 mg by mouth daily.     Start Date:   -- End Date:   --          BIOTIN PO Take  by mouth.     Start Date:   -- End Date:   --          CHOLECALCIFEROL (VITAMIN D3) 1000 UNITS TABLET Take 3,000 Units by mouth daily.     Start Date:   -- End Date:   --          MAGNESIUM 500 MG CAPS Take  by mouth.     Start Date:   -- End Date:   --          MULTIPLE VITAMINS-MINERALS (MULTIVITAMIN PO) Take  by mouth.     Start Date:   -- End Date:   --          PSEUDOEPHEDRINE (SUDAFED 12 HOUR) 120 MG 12 HR TABLET Take 1 tablet by mouth daily as needed for Congestion.     Start Date:   04/07/16 End Date:   --            These Medications Have Changed     No Medications Reconciled      Stop Taking     No Medications Reconciled      Facility Administered Medications     No Medications Reconciled        Discharge  Instructions       Post-cerebral Angiogram Instructions    Activity:  · Gradually increase your activities as tolerated.   Do not lift anything heavier than 10 pounds (approximately the weight of a full gallon of milk) for the next 3-5 days.  Avoid any pulling or pushing activities.  Avoid strenuous exercise for the next week.   Do to the artery puncture site in the groin, avoid straining at bowel movements.  Avoid returning to work for 5-7 days after the procedure.    Bathing/ Incision Care:  · You may shower 24 hours after the procedure. Showering is okay, but avoid sitting in any bath tubs or hot tubs, or swimming in any pools, lakes or rivers for the next week.  · Remove the bandage before showering. Gently clean the site using soap and water. Do not scrub or massage the groin site when bathing. Dry the site thoroughly and apply a new bandage. If steri-strips are place, allow them to fall off on their own.  · Check for signs of infection such as incisional swelling, redness, yellow or green discharge or increased temperature to the touch.  · Do not apply creams, lotions, or ointments on or near your incision.  · If bleeding occurs at the puncture site, lie down and apply firm pressure.  · If you received an angio-seal to close the artery puncture site, the body will absorb the collagen plug in about 60 to 90 days. During this time, carry your patient information card with you at all times.    Seek medical attention:   · If your temperature exceeds 101° F for 24 hours, or if the incision site begins to separate or show signs of infection, such as redness, swelling, pain, or drainage, seek medical attention.  · Go to the nearest emergency room if you experience a large swelling or sudden pain at the puncture site, or loss of sensation, numbness or swelling of the leg.   · Call 911 if you experience sudden symptoms, including dizziness, difficulties with speech, severe headache, visual changes, decreased level of  consciousness, difficulties with walking or balance, a popping or snapping sensation in head, nausea and vomiting, or a stiff neck.                 Appointment follow-up:  · If a follow-up appointment has not been scheduled for you prior to hospital discharge, call Dr. Gilmore's office to make an appointment.  · Office phone: [phone]  · Office Address: [address] [place] [address], [address], [place] [address]      Procedural Sedation    After your procedural sedation  You will begin to feel more awake and aware. But you will likely be drowsy for a while afterward. You will be closely watched as you become more alert. You may have a faint memory of the procedure. Or you may not remember it at all.  You should be able to return home within an hour or two after your procedure. Plan to have someone stay with you for a few hours. Side effects such as headache and nausea may go away quickly. Tell your health care provider if they continue.  Don’t drive or make any important decisions for at least 24 hours. Be sure to follow all after-care instructions.     When to call your health care provider  Have someone call your health care provider right away if you have any of these:  · Drowsiness that gets worse  · Weakness or dizziness that gets worse  · Repeated vomiting  · You can’t be awakened     Your balance and reaction times will not be at your normal for the next 24 hours.  Use extreme caution with ambulation and any other activities.  No driving or drinking alcoholic beverages for the next 24 hours.  Do not sign any legal documents for the next 24 hours.      IF YOU HAVE ANY QUESTIONS OR CONCERNS, PLEASE DO NOT HESITATE TO CALL [phone].          Discharge References/Attachments     None       CHF with EF 10-15% 2/2 ischemic cardiomyopathy s/p AICD. Elevated BNP on admission with R sided effusion and edema. Patient with persistent pleural effusions on CXR and US and b/l dependent edema. Assisted now with HD.  -HOLDING ACE/BB in setting of sepsis/ hypotension  - c/w midodrine  - anuric, therefore no Lasix/diuresis  - c/w HD for fluid balance    #CAD- Hx of MI and ischemic CM s/p AICD, STEMI 7/2017, was started on Aspirin and Brilinta.   - c/w aspirin 81 and Atorvastatin 80mg qhs

## 2018-06-05 NOTE — PROGRESS NOTE ADULT - SUBJECTIVE AND OBJECTIVE BOX
Patient seen and examined at bedside. Patient is a 63 year old male with a history of HFrEF 10-15% due to ischemic cardiomyopathy, s/p AICD, ?atrial fibrillation, hypertension, diabetes mellitus type 2, gout, and CKD for whom nephrology was called for GILMER from ATN requiring hemodialysis. Patient is in no acute distress. Patient was last dialyzed . Reviewed patient's xray chest which showed persistent right sided pleural effusion and small left sided effusion.     acetaminophen    Suspension. 650 milliGRAM(s) every 8 hours  acetylcysteine 20% Inhalation 4 milliLiter(s) two times a day  aspirin  chewable 81 milliGRAM(s) daily  atorvastatin 80 milliGRAM(s) at bedtime  calcitriol  Solution 0.25 MICROGram(s) daily  collagenase Ointment 1 Application(s) daily  dextrose 5%. 1000 milliLiter(s) <Continuous>  dextrose 50% Injectable 12.5 Gram(s) once  dextrose 50% Injectable 25 Gram(s) once  dextrose 50% Injectable 25 Gram(s) once  dextrose Gel 1 Dose(s) once PRN  doxercalciferol Injectable 2 MICROGram(s) once  epoetin amy Injectable 09418 Unit(s) once  escitalopram 5 milliGRAM(s) daily  folic acid 1 milliGRAM(s) daily  glucagon  Injectable 1 milliGRAM(s) once PRN  hydrocortisone 10 milliGRAM(s) every 12 hours  insulin regular  human corrective regimen sliding scale   <User Schedule>  insulin regular  human recombinant 13 Unit(s) <User Schedule>  insulin regular  human recombinant 6 Unit(s) <User Schedule>  levETIRAcetam  Solution 500 milliGRAM(s) every 24 hours  metoclopramide 5 milliGRAM(s) every 12 hours  midodrine 10 milliGRAM(s) every 8 hours  multivitamin 1 Tablet(s) daily  piperacillin/tazobactam IVPB. 2.25 Gram(s) every 8 hours    Allergies    No Known Allergies    Intolerances    T(C): , Max: 37 (18 @ 06:07)  T(F): , Max: 98.6 (18 @ 06:07)  HR: 93 (18 @ 09:14)  BP: 107/70 (18 @ 09:14)  RR: 19 (18 @ 09:14)  SpO2: 100% (18 @ 09:14)    06-04 @ 07:01  -   @ 07:00  --------------------------------------------------------  IN:    Enteral Tube Flush: 200 mL    Jevity: 744 mL  Total IN: 944 mL    OUT:  Total OUT: 0 mL    Total NET: 944 mL    LABS:                        8.0    24.6  )-----------( 261      ( 2018 06:40 )             27.4     06-    138  |  97  |  55<H>  ----------------------------<  324<H>  5.1   |  27  |  3.84<H>    Ca    9.0      2018 06:40  Mg     2.4     -    TPro  8.5<H>  /  Alb  2.4<L>  /  TBili  0.5  /  DBili  x   /  AST  56<H>  /  ALT  78<H>  /  AlkPhos  545<H>  06-    PT/INR - ( 2018 06:40 )   PT: 34.8 sec;   INR: 3.06       PTT - ( 2018 12:55 )  PTT:41.0 sec  Urinalysis Basic - ( 2018 11:47 )    Color: PINK / Appearance: Turbid / S.020 / pH: x  Gluc: x / Ketone: NEGATIVE  / Bili: Small / Urobili: 0.2 E.U./dL   Blood: x / Protein: >=300 mg/dL / Nitrite: POSITIVE   Leuk Esterase: Large / RBC: Many /HPF / WBC Loaded /HPF   Sq Epi: x / Non Sq Epi: 0-5 /HPF / Bacteria: Many /HPF

## 2018-06-05 NOTE — PROGRESS NOTE ADULT - PROBLEM SELECTOR PLAN 1
Patient is a 63 year old male with acute renal failure from ischemic ATN now requiring hemodialysis. Patient was last dialyzed on 6/2    P - Dialysis today   Revaclear 400, , , 2K bath   goal UF 2.5 kg over 3.5 hours

## 2018-06-05 NOTE — PROGRESS NOTE ADULT - SUBJECTIVE AND OBJECTIVE BOX
Patient was seen and evaluated on dialysis.   Patient is tolerating the procedure well.   HR: 96 (06-05-18 @ 17:05)  BP: 97/70 (06-05-18 @ 17:05)  Continue dialysis:   Dialyzer:  Revaclear 400        QB:   400     QD: 500 2K bath   Goal UF 2.5kg over 3.5 Hours   Albumin 25% 50mL q1 hour on HD

## 2018-06-05 NOTE — PROGRESS NOTE ADULT - PROBLEM SELECTOR PLAN 1
- Patient treated for Sacral Decub growing Pseudomonas for 7 days with IV Zosyn - since stopping, persistently elevating leukocytosis  - Low grade temp rectally 100.3 F (6/5)  - Will persist with full skin exam - new purulence in ulcer - to follow up with MRI Pelvis and Hip to assess for Osteo   - Repeat Blood Cx, f/u   - F/u urine culture  - CXR for the most part unchanged.     #Fungemia  RESOLVED. Noted to have Candida Tropicalis growing in blood cultures and completed fluconazole course.

## 2018-06-05 NOTE — PROGRESS NOTE ADULT - PROBLEM SELECTOR PLAN 3
DM2 on Januvia at home. HbA1C 8.6. Difficulty controlling glucose levels with multiple episodes of hypoglycemia and hyperglycemia. Symptomatically hypoglycemic with isolated evening F/S 50-60s.   - F/u insulin level and C-peptide   - C/w regular insulin 13u q 6 hrs  - ISS

## 2018-06-05 NOTE — PROGRESS NOTE ADULT - PROBLEM SELECTOR PLAN 10
VTE: Coumadin dosed nightly, with INR daily checks, goal 2-3  GI PPx: PPI  DNR- Made DNR, family wants escalation of care, pressors if needed.  F: No IVF in setting of HD  E: Replete electrolytes with caution in setting of HD  N: Jevity 1.5 goal rate 62cc/hr, per updated nutrition recs 5/21  Dispo: SD to [facility], likely chronic vent facility + HD, pending SW placement. Dispo plans and SD plans discussed with daughter Cristal

## 2018-06-05 NOTE — PROGRESS NOTE ADULT - SUBJECTIVE AND OBJECTIVE BOX
INTERVAL HPI/OVERNIGHT EVENTS:  Last evening - patient with episode hypoglycemia at 5pm - this is a recurrent temporal issue, unclear why. BMP, Insulin, C-peptide drawn at same time. BMP returned with correspondingly low Glucose. Cause for hypoglycemia is unclear.     Otherwise, following up ID recs - to evaluate imaging of Pelvis/Hip to assess for osteomyelitis at area of sacral ulcer. Also pending urine culture results. Patient continues to be on Zosyn - will continue.     ROS is unobtainable.     VITAL SIGNS:  T(F): 98.6 (05 Jun 2018 09:14), Max: 98.6 (05 Jun 2018 06:07)  HR: 93 (05 Jun 2018 09:14) (80 - 94)  BP: 107/70 (05 Jun 2018 09:14) (100/62 - 107/70)  RR: 19 (05 Jun 2018 09:14) (12 - 20)  SpO2: 100% (05 Jun 2018 09:14) (97% - 100%)    I&O:  06-03-18 @ 07:01  -  06-04-18 @ 07:00  --------------------------------------------------------  IN: 894 mL / OUT: 0 mL / NET: 894 mL    PHYSICAL EXAM:  Constitutional: Patient lying in bed, no acute distress. Trach in situ, Ventilator Dependent.   HEENT: PERRLA  Neck: No LAD, No JVD  Respiratory: Normal air entry, no wheeze or crackles on auscultation.   Cardiovascular: S1 and S2 present - no additional abnormal sounds or murmurs. Normal rhythm and rate of pulse.   Gastrointestinal: BS+, soft, slightly distended and tympanic to percussion.   Extremities: No peripheral edema  Vascular: 2+ peripheral pulses  Neurological: A/O x 3, CN V-XII grossly intact.  Psychiatric: Normal mood, normal affect  Musculoskeletal: 5/5 strength b/l upper and lower extremities  Skin: B/l venous stasis changes present.     MEDICATIONS  (STANDING):  acetaminophen    Suspension. 650 milliGRAM(s) Oral every 8 hours  acetylcysteine 20% Inhalation 4 milliLiter(s) Inhalation two times a day  aspirin  chewable 81 milliGRAM(s) Oral daily  atorvastatin 80 milliGRAM(s) Oral at bedtime  calcitriol  Solution 0.25 MICROGram(s) Oral daily  collagenase Ointment 1 Application(s) Topical daily  dextrose 5%. 1000 milliLiter(s) (50 mL/Hr) IV Continuous <Continuous>  dextrose 50% Injectable 12.5 Gram(s) IV Push once  dextrose 50% Injectable 25 Gram(s) IV Push once  dextrose 50% Injectable 25 Gram(s) IV Push once  escitalopram 5 milliGRAM(s) Oral daily  folic acid 1 milliGRAM(s) Oral daily  hydrocortisone 10 milliGRAM(s) Oral every 12 hours  insulin regular  human corrective regimen sliding scale   SubCutaneous every 6 hours  insulin regular  human recombinant 13 Unit(s) SubCutaneous every 6 hours  levETIRAcetam  Solution 500 milliGRAM(s) Oral every 24 hours  metoclopramide 5 milliGRAM(s) Oral every 12 hours  midodrine 10 milliGRAM(s) Oral every 8 hours  modafinil 100 milliGRAM(s) Oral daily  multivitamin 1 Tablet(s) Oral daily  warfarin 2.5 milliGRAM(s) Oral at bedtime    MEDICATIONS  (PRN):  dextrose Gel 1 Dose(s) Oral once PRN Blood Glucose LESS THAN 70 milliGRAM(s)/deciliter  glucagon  Injectable 1 milliGRAM(s) IntraMuscular once PRN Glucose LESS THAN 70 milligrams/deciliter      Allergies    No Known Allergies    Intolerances      LABS:                        8.0    24.6  )-----------( 261      ( 05 Jun 2018 06:40 )             27.4       138  |  97  |  55<H>  ----------------------------<  324<H>  5.1   |  27  |  3.84<H>    Ca    9.0      05 Jun 2018 06:40  Mg     2.4     06-05    TPro  8.5<H>  /  Alb  2.4<L>  /  TBili  0.5  /  DBili  x   /  AST  56<H>  /  ALT  78<H>  /  AlkPhos  545<H>  06-04      RADIOLOGY & ADDITIONAL TESTS:  Pending MR Hip and MR Pelvis.

## 2018-06-05 NOTE — PROGRESS NOTE ADULT - SUBJECTIVE AND OBJECTIVE BOX
INTERVAL HPI/OVERNIGHT EVENTS:  Paitent seen and examined at bedside. Not following commands. opens eyes briefly when verbally stimulated .    ROS: unable to obtain.       ANTIBIOTICS/RELEVANT:    MEDICATIONS  (STANDING):  acetaminophen    Suspension. 650 milliGRAM(s) Oral every 8 hours  acetylcysteine 20% Inhalation 4 milliLiter(s) Inhalation two times a day  aspirin  chewable 81 milliGRAM(s) Oral daily  atorvastatin 80 milliGRAM(s) Oral at bedtime  calcitriol  Solution 0.25 MICROGram(s) Oral daily  collagenase Ointment 1 Application(s) Topical daily  dextrose 5%. 1000 milliLiter(s) (50 mL/Hr) IV Continuous <Continuous>  dextrose 50% Injectable 12.5 Gram(s) IV Push once  dextrose 50% Injectable 25 Gram(s) IV Push once  dextrose 50% Injectable 25 Gram(s) IV Push once  doxercalciferol Injectable 2 MICROGram(s) IV Push once  epoetin amy Injectable 72152 Unit(s) IV Push once  escitalopram 5 milliGRAM(s) Oral daily  folic acid 1 milliGRAM(s) Oral daily  hydrocortisone 10 milliGRAM(s) Oral every 12 hours  insulin regular  human corrective regimen sliding scale   SubCutaneous <User Schedule>  insulin regular  human recombinant 13 Unit(s) SubCutaneous <User Schedule>  insulin regular  human recombinant 6 Unit(s) SubCutaneous <User Schedule>  levETIRAcetam  Solution 500 milliGRAM(s) Oral every 24 hours  metoclopramide 5 milliGRAM(s) Oral every 12 hours  midodrine 10 milliGRAM(s) Oral every 8 hours  modafinil 100 milliGRAM(s) Oral daily  multivitamin 1 Tablet(s) Oral daily  piperacillin/tazobactam IVPB. 2.25 Gram(s) IV Intermittent every 8 hours    MEDICATIONS  (PRN):  dextrose Gel 1 Dose(s) Oral once PRN Blood Glucose LESS THAN 70 milliGRAM(s)/deciliter  glucagon  Injectable 1 milliGRAM(s) IntraMuscular once PRN Glucose LESS THAN 70 milligrams/deciliter        Vital Signs Last 24 Hrs  T(C): 37 (2018 09:14), Max: 37.9 (2018 10:19)  T(F): 98.6 (2018 09:14), Max: 100.3 (2018 10:19)  HR: 93 (2018 09:14) (80 - 94)  BP: 107/70 (2018 09:14) (100/62 - 107/70)  BP(mean): --  RR: 19 (2018 09:14) (12 - 20)  SpO2: 100% (2018 09:14) (97% - 100%)    18 @ 07:01  -  18 @ 07:00  --------------------------------------------------------  IN: 944 mL / OUT: 0 mL / NET: 944 mL      PHYSICAL EXAM:  Constitutional: chronically ill-appearing, currently on AC-VC  Eyes:MAXI, EOMI  Ear/Nose/Throat: no oral lesion, no sinus tenderness on percussion	  Neck:no JVD, no lymphadenopathy, supple  Respiratory: decreased BS at bases secondary to poor effort   Cardiovascular: S1S2 RRR, no murmurs  Gastrointestinal: soft, (+) BS, no HSM  Extremities:no e/e/c; atrophy  Skin: deep sacral decubitus ulcer with fecal contamination examined on         LABS:                        8.0    24.6  )-----------( 261      ( 2018 06:40 )             27.4     06-05    138  |  97  |  55<H>  ----------------------------<  324<H>  5.1   |  27  |  3.84<H>    Ca    9.0      2018 06:40  Mg     2.4     06-05    TPro  8.5<H>  /  Alb  2.4<L>  /  TBili  0.5  /  DBili  x   /  AST  56<H>  /  ALT  78<H>  /  AlkPhos  545<H>  06-    PT/INR - ( 2018 06:40 )   PT: 34.8 sec;   INR: 3.06          PTT - ( 2018 12:55 )  PTT:41.0 sec  Urinalysis Basic - ( 2018 11:47 )    Color: PINK / Appearance: Turbid / S.020 / pH: x  Gluc: x / Ketone: NEGATIVE  / Bili: Small / Urobili: 0.2 E.U./dL   Blood: x / Protein: >=300 mg/dL / Nitrite: POSITIVE   Leuk Esterase: Large / RBC: Many /HPF / WBC Loaded /HPF   Sq Epi: x / Non Sq Epi: 0-5 /HPF / Bacteria: Many /HPF        MICROBIOLOGY:    Culture - Urine (18 @ 13:45)    Specimen Source: .Urine Catheterized    Culture Results:   No growth to date.      Culture - Blood (18 @ 13:29)    Specimen Source: .Blood 2 Sets Of Blood Cultures    Culture Results:   No growth at 12 hours    Culture - Blood (18 @ 13:29)    Specimen Source: .Blood 2 Sets Of Blood Cultures    Culture Results:   No growth at 12 hours        RADIOLOGY & ADDITIONAL STUDIES:      Xray Chest 1 View- PORTABLE-Urgent (18 @ 10:59) >    Impression:    Left pleural effusion improving versus shifting. Right pleural effusion   grossly unchanged.    < end of copied text >      A/P: 62 yo male with prolonged hospital course, C. tropicalis BSI 2/2 PC s/p removal  (has ICD, RUKHSANA, gallium scan, ophtho eval negative), Klebsiella BSI 2/2 UTI s/p treatment, trach, PEG, recent course of Zosyn - for ?SSTI, brain stem injury with minimal mental status and inability to reposition self, persistent leukocytosis, now with pyuria c/f UTI and deep contaminated sacral wound c/f osteomyelitis; ESR and CRP severely elevated suspicious for osteo.   - f/u bcx ngtd (past 12 hours)  - f/u ucx  - pelvic/hip MRI ordered to evaluate for underlying osteomyelitis and abscess   - continue Zosyn 2.25g IV q8h  - f/u GOC      PENDING DISCUSSION WITH ATTENDING INTERVAL HPI/OVERNIGHT EVENTS:  Paitent seen and examined at bedside. Not following commands. opens eyes briefly when verbally stimulated .    ROS: unable to obtain.       ANTIBIOTICS/RELEVANT:    MEDICATIONS  (STANDING):  acetaminophen    Suspension. 650 milliGRAM(s) Oral every 8 hours  acetylcysteine 20% Inhalation 4 milliLiter(s) Inhalation two times a day  aspirin  chewable 81 milliGRAM(s) Oral daily  atorvastatin 80 milliGRAM(s) Oral at bedtime  calcitriol  Solution 0.25 MICROGram(s) Oral daily  collagenase Ointment 1 Application(s) Topical daily  dextrose 5%. 1000 milliLiter(s) (50 mL/Hr) IV Continuous <Continuous>  dextrose 50% Injectable 12.5 Gram(s) IV Push once  dextrose 50% Injectable 25 Gram(s) IV Push once  dextrose 50% Injectable 25 Gram(s) IV Push once  doxercalciferol Injectable 2 MICROGram(s) IV Push once  epoetin amy Injectable 70848 Unit(s) IV Push once  escitalopram 5 milliGRAM(s) Oral daily  folic acid 1 milliGRAM(s) Oral daily  hydrocortisone 10 milliGRAM(s) Oral every 12 hours  insulin regular  human corrective regimen sliding scale   SubCutaneous <User Schedule>  insulin regular  human recombinant 13 Unit(s) SubCutaneous <User Schedule>  insulin regular  human recombinant 6 Unit(s) SubCutaneous <User Schedule>  levETIRAcetam  Solution 500 milliGRAM(s) Oral every 24 hours  metoclopramide 5 milliGRAM(s) Oral every 12 hours  midodrine 10 milliGRAM(s) Oral every 8 hours  modafinil 100 milliGRAM(s) Oral daily  multivitamin 1 Tablet(s) Oral daily  piperacillin/tazobactam IVPB. 2.25 Gram(s) IV Intermittent every 8 hours    MEDICATIONS  (PRN):  dextrose Gel 1 Dose(s) Oral once PRN Blood Glucose LESS THAN 70 milliGRAM(s)/deciliter  glucagon  Injectable 1 milliGRAM(s) IntraMuscular once PRN Glucose LESS THAN 70 milligrams/deciliter        Vital Signs Last 24 Hrs  T(C): 37 (2018 09:14), Max: 37.9 (2018 10:19)  T(F): 98.6 (2018 09:14), Max: 100.3 (2018 10:19)  HR: 93 (2018 09:14) (80 - 94)  BP: 107/70 (2018 09:14) (100/62 - 107/70)  BP(mean): --  RR: 19 (2018 09:14) (12 - 20)  SpO2: 100% (2018 09:14) (97% - 100%)    18 @ 07:01  -  18 @ 07:00  --------------------------------------------------------  IN: 944 mL / OUT: 0 mL / NET: 944 mL      PHYSICAL EXAM:  Constitutional: chronically ill-appearing, currently on AC-VC  Eyes:MAXI, EOMI  Ear/Nose/Throat: no oral lesion, no sinus tenderness on percussion	  Neck:no JVD, no lymphadenopathy, supple  Respiratory: decreased BS at bases secondary to poor effort   Cardiovascular: S1S2 RRR, no murmurs  Gastrointestinal: soft, (+) BS, no HSM  Extremities:no e/e/c; atrophy  Skin: deep sacral decubitus ulcer with fecal contamination examined on         LABS:                        8.0    24.6  )-----------( 261      ( 2018 06:40 )             27.4     06-05    138  |  97  |  55<H>  ----------------------------<  324<H>  5.1   |  27  |  3.84<H>    Ca    9.0      2018 06:40  Mg     2.4     06-05    TPro  8.5<H>  /  Alb  2.4<L>  /  TBili  0.5  /  DBili  x   /  AST  56<H>  /  ALT  78<H>  /  AlkPhos  545<H>  06-    PT/INR - ( 2018 06:40 )   PT: 34.8 sec;   INR: 3.06          PTT - ( 2018 12:55 )  PTT:41.0 sec  Urinalysis Basic - ( 2018 11:47 )    Color: PINK / Appearance: Turbid / S.020 / pH: x  Gluc: x / Ketone: NEGATIVE  / Bili: Small / Urobili: 0.2 E.U./dL   Blood: x / Protein: >=300 mg/dL / Nitrite: POSITIVE   Leuk Esterase: Large / RBC: Many /HPF / WBC Loaded /HPF   Sq Epi: x / Non Sq Epi: 0-5 /HPF / Bacteria: Many /HPF        MICROBIOLOGY:    Culture - Urine (18 @ 13:45)    Specimen Source: .Urine Catheterized    Culture Results:   No growth to date.      Culture - Blood (18 @ 13:29)    Specimen Source: .Blood 2 Sets Of Blood Cultures    Culture Results:   No growth at 12 hours    Culture - Blood (18 @ 13:29)    Specimen Source: .Blood 2 Sets Of Blood Cultures    Culture Results:   No growth at 12 hours        RADIOLOGY & ADDITIONAL STUDIES:      Xray Chest 1 View- PORTABLE-Urgent (18 @ 10:59) >    Impression:    Left pleural effusion improving versus shifting. Right pleural effusion   grossly unchanged.    < end of copied text >      A/P: 64 yo male with prolonged hospital course, C. tropicalis BSI 2/2 PC s/p removal  (has ICD, RUKHSANA, gallium scan, ophtho eval negative), Klebsiella BSI 2/2 UTI s/p treatment, trach, PEG, recent course of Zosyn - for ?SSTI, brain stem injury with minimal mental status and inability to reposition self, persistent leukocytosis, now with pyuria c/f UTI and deep contaminated sacral wound c/f osteomyelitis; ESR and CRP severely elevated suspicious for osteo.   - f/u bcx ngtd (past 12 hours)  - f/u ucx--neg  - pelvic/hip MRI ordered to evaluate for underlying osteomyelitis and abscess   - continue Zosyn 2.25g IV q8h  - f/u GOC

## 2018-06-06 LAB
ANION GAP SERPL CALC-SCNC: 19 MMOL/L — HIGH (ref 5–17)
BUN SERPL-MCNC: 39 MG/DL — HIGH (ref 7–23)
C PEPTIDE SERPL-MCNC: 1.6 NG/ML — SIGNIFICANT CHANGE UP (ref 0.9–7.1)
CALCIUM SERPL-MCNC: 9.1 MG/DL — SIGNIFICANT CHANGE UP (ref 8.4–10.5)
CHLORIDE SERPL-SCNC: 97 MMOL/L — SIGNIFICANT CHANGE UP (ref 96–108)
CO2 SERPL-SCNC: 24 MMOL/L — SIGNIFICANT CHANGE UP (ref 22–31)
CREAT SERPL-MCNC: 2.75 MG/DL — HIGH (ref 0.5–1.3)
GLUCOSE BLDC GLUCOMTR-MCNC: 100 MG/DL — HIGH (ref 70–99)
GLUCOSE BLDC GLUCOMTR-MCNC: 141 MG/DL — HIGH (ref 70–99)
GLUCOSE BLDC GLUCOMTR-MCNC: 246 MG/DL — HIGH (ref 70–99)
GLUCOSE BLDC GLUCOMTR-MCNC: 279 MG/DL — HIGH (ref 70–99)
GLUCOSE BLDC GLUCOMTR-MCNC: 359 MG/DL — HIGH (ref 70–99)
GLUCOSE SERPL-MCNC: 240 MG/DL — HIGH (ref 70–99)
HCT VFR BLD CALC: 26.9 % — LOW (ref 39–50)
HGB BLD-MCNC: 7.8 G/DL — LOW (ref 13–17)
INR BLD: 2.35 — HIGH (ref 0.88–1.16)
INSULIN SERPL-MCNC: 24 UU/ML — HIGH (ref 3–17)
MAGNESIUM SERPL-MCNC: 2.2 MG/DL — SIGNIFICANT CHANGE UP (ref 1.6–2.6)
MCHC RBC-ENTMCNC: 25.6 PG — LOW (ref 27–34)
MCHC RBC-ENTMCNC: 29 G/DL — LOW (ref 32–36)
MCV RBC AUTO: 88.2 FL — SIGNIFICANT CHANGE UP (ref 80–100)
PLATELET # BLD AUTO: 259 K/UL — SIGNIFICANT CHANGE UP (ref 150–400)
POTASSIUM SERPL-MCNC: 4.7 MMOL/L — SIGNIFICANT CHANGE UP (ref 3.5–5.3)
POTASSIUM SERPL-SCNC: 4.7 MMOL/L — SIGNIFICANT CHANGE UP (ref 3.5–5.3)
PROTHROM AB SERPL-ACNC: 26.5 SEC — HIGH (ref 9.8–12.7)
RBC # BLD: 3.05 M/UL — LOW (ref 4.2–5.8)
RBC # FLD: 18.7 % — HIGH (ref 10.3–16.9)
SODIUM SERPL-SCNC: 140 MMOL/L — SIGNIFICANT CHANGE UP (ref 135–145)
WBC # BLD: 23 K/UL — HIGH (ref 3.8–10.5)
WBC # FLD AUTO: 23 K/UL — HIGH (ref 3.8–10.5)

## 2018-06-06 PROCEDURE — 99233 SBSQ HOSP IP/OBS HIGH 50: CPT | Mod: GC

## 2018-06-06 PROCEDURE — 99232 SBSQ HOSP IP/OBS MODERATE 35: CPT | Mod: GC

## 2018-06-06 PROCEDURE — 71045 X-RAY EXAM CHEST 1 VIEW: CPT | Mod: 26

## 2018-06-06 RX ORDER — INSULIN HUMAN 100 [IU]/ML
13 INJECTION, SOLUTION SUBCUTANEOUS EVERY 6 HOURS
Qty: 0 | Refills: 0 | Status: DISCONTINUED | OUTPATIENT
Start: 2018-06-06 | End: 2018-06-14

## 2018-06-06 RX ORDER — ACETAMINOPHEN 500 MG
650 TABLET ORAL ONCE
Qty: 0 | Refills: 0 | Status: COMPLETED | OUTPATIENT
Start: 2018-06-06 | End: 2018-06-06

## 2018-06-06 RX ORDER — WARFARIN SODIUM 2.5 MG/1
2.5 TABLET ORAL ONCE
Qty: 0 | Refills: 0 | Status: COMPLETED | OUTPATIENT
Start: 2018-06-06 | End: 2018-06-06

## 2018-06-06 RX ADMIN — INSULIN HUMAN 5: 100 INJECTION, SOLUTION SUBCUTANEOUS at 00:46

## 2018-06-06 RX ADMIN — Medication 650 MILLIGRAM(S): at 22:15

## 2018-06-06 RX ADMIN — Medication 650 MILLIGRAM(S): at 06:49

## 2018-06-06 RX ADMIN — LEVETIRACETAM 500 MILLIGRAM(S): 250 TABLET, FILM COATED ORAL at 15:44

## 2018-06-06 RX ADMIN — Medication 650 MILLIGRAM(S): at 16:15

## 2018-06-06 RX ADMIN — PIPERACILLIN AND TAZOBACTAM 200 GRAM(S): 4; .5 INJECTION, POWDER, LYOPHILIZED, FOR SOLUTION INTRAVENOUS at 22:50

## 2018-06-06 RX ADMIN — Medication 1 MILLIGRAM(S): at 11:41

## 2018-06-06 RX ADMIN — CALCITRIOL 0.25 MICROGRAM(S): 0.5 CAPSULE ORAL at 11:41

## 2018-06-06 RX ADMIN — MODAFINIL 100 MILLIGRAM(S): 200 TABLET ORAL at 11:41

## 2018-06-06 RX ADMIN — Medication 10 MILLIGRAM(S): at 22:49

## 2018-06-06 RX ADMIN — INSULIN HUMAN 13 UNIT(S): 100 INJECTION, SOLUTION SUBCUTANEOUS at 17:52

## 2018-06-06 RX ADMIN — INSULIN HUMAN 13 UNIT(S): 100 INJECTION, SOLUTION SUBCUTANEOUS at 07:36

## 2018-06-06 RX ADMIN — PIPERACILLIN AND TAZOBACTAM 200 GRAM(S): 4; .5 INJECTION, POWDER, LYOPHILIZED, FOR SOLUTION INTRAVENOUS at 15:45

## 2018-06-06 RX ADMIN — MIDODRINE HYDROCHLORIDE 10 MILLIGRAM(S): 2.5 TABLET ORAL at 06:48

## 2018-06-06 RX ADMIN — Medication 10 MILLIGRAM(S): at 11:40

## 2018-06-06 RX ADMIN — ESCITALOPRAM OXALATE 5 MILLIGRAM(S): 10 TABLET, FILM COATED ORAL at 11:41

## 2018-06-06 RX ADMIN — INSULIN HUMAN 13 UNIT(S): 100 INJECTION, SOLUTION SUBCUTANEOUS at 00:47

## 2018-06-06 RX ADMIN — MIDODRINE HYDROCHLORIDE 10 MILLIGRAM(S): 2.5 TABLET ORAL at 17:53

## 2018-06-06 RX ADMIN — Medication 1 TABLET(S): at 11:41

## 2018-06-06 RX ADMIN — PIPERACILLIN AND TAZOBACTAM 200 GRAM(S): 4; .5 INJECTION, POWDER, LYOPHILIZED, FOR SOLUTION INTRAVENOUS at 06:49

## 2018-06-06 RX ADMIN — Medication 5 MILLIGRAM(S): at 17:53

## 2018-06-06 RX ADMIN — Medication 5 MILLIGRAM(S): at 06:49

## 2018-06-06 RX ADMIN — Medication 1 APPLICATION(S): at 11:42

## 2018-06-06 RX ADMIN — INSULIN HUMAN 13 UNIT(S): 100 INJECTION, SOLUTION SUBCUTANEOUS at 12:40

## 2018-06-06 RX ADMIN — ATORVASTATIN CALCIUM 80 MILLIGRAM(S): 80 TABLET, FILM COATED ORAL at 22:49

## 2018-06-06 RX ADMIN — Medication 650 MILLIGRAM(S): at 15:43

## 2018-06-06 RX ADMIN — Medication 4 MILLILITER(S): at 17:54

## 2018-06-06 RX ADMIN — Medication 81 MILLIGRAM(S): at 11:41

## 2018-06-06 RX ADMIN — MIDODRINE HYDROCHLORIDE 10 MILLIGRAM(S): 2.5 TABLET ORAL at 11:41

## 2018-06-06 RX ADMIN — INSULIN HUMAN 3: 100 INJECTION, SOLUTION SUBCUTANEOUS at 12:40

## 2018-06-06 RX ADMIN — WARFARIN SODIUM 2.5 MILLIGRAM(S): 2.5 TABLET ORAL at 22:49

## 2018-06-06 RX ADMIN — Medication 4 MILLILITER(S): at 06:49

## 2018-06-06 RX ADMIN — INSULIN HUMAN 2: 100 INJECTION, SOLUTION SUBCUTANEOUS at 07:35

## 2018-06-06 NOTE — PROGRESS NOTE ADULT - SUBJECTIVE AND OBJECTIVE BOX
Patient seen and examined at bedside. Patient is a 63 year old male with a history of HFrEF 10-15% due to ischemic cardiomyopathy, s/p AICD, ?atrial fibrillation, hypertension, diabetes mellitus type 2, gout, and CKD for whom nephrology was called for GILMER from [facility] requiring hemodialysis. Patient appears to be in no distress. Patient was last dialyzed 6/5 with UF of 2.5L    acetaminophen    Suspension. 650 milliGRAM(s) every 8 hours  acetylcysteine 20% Inhalation 4 milliLiter(s) two times a day  aspirin  chewable 81 milliGRAM(s) daily  atorvastatin 80 milliGRAM(s) at bedtime  calcitriol  Solution 0.25 MICROGram(s) daily  collagenase Ointment 1 Application(s) daily  dextrose 5%. 1000 milliLiter(s) <Continuous>  dextrose 50% Injectable 12.5 Gram(s) once  dextrose 50% Injectable 25 Gram(s) once  dextrose 50% Injectable 25 Gram(s) once  dextrose Gel 1 Dose(s) once PRN  escitalopram 5 milliGRAM(s) daily  folic acid 1 milliGRAM(s) daily  glucagon  Injectable 1 milliGRAM(s) once PRN  hydrocortisone 10 milliGRAM(s) every 12 hours  insulin regular  human corrective regimen sliding scale   <User Schedule>  insulin regular  human recombinant 13 Unit(s) every 6 hours  levETIRAcetam  Solution 500 milliGRAM(s) every 24 hours  metoclopramide 5 milliGRAM(s) every 12 hours  midodrine 10 milliGRAM(s) every 8 hours  modafinil 100 milliGRAM(s) daily  multivitamin 1 Tablet(s) daily  piperacillin/tazobactam IVPB. 2.25 Gram(s) every 8 hours  warfarin 2.5 milliGRAM(s) once      Allergies    No Known Allergies    Intolerances        T(C): , Max: 36.9 (06-06-18 @ 09:29)  T(F): , Max: 98.4 (06-06-18 @ 09:29)  HR: 85 (06-06-18 @ 09:29)  BP: 82/54 (06-06-18 @ 09:29)  BP(mean): 63 (06-06-18 @ 09:29)  RR: 20 (06-06-18 @ 09:29)  SpO2: 100% (06-06-18 @ 09:29)  Wt(kg): --    06-05 @ 07:01  -  06-06 @ 07:00  --------------------------------------------------------  IN:    Enteral Tube Flush: 150 mL    Jevity: 744 mL  Total IN: 894 mL    OUT:    Other: 2500 mL  Total OUT: 2500 mL    Total NET: -1606 mL    LABS:                        7.8    23.0  )-----------( 259      ( 06 Jun 2018 07:25 )             26.9     06-06    140  |  97  |  39<H>  ----------------------------<  240<H>  4.7   |  24  |  2.75<H>    Ca    9.1      06 Jun 2018 07:25  Mg     2.2     06-06    TPro  8.5<H>  /  Alb  2.4<L>  /  TBili  0.5  /  DBili  x   /  AST  56<H>  /  ALT  78<H>  /  AlkPhos  545<H>  06-04    PT/INR - ( 06 Jun 2018 07:25 )   PT: 26.5 sec;   INR: 2.35

## 2018-06-06 NOTE — PROGRESS NOTE ADULT - PROBLEM SELECTOR PLAN 10
VTE: Coumadin dosed nightly, with INR daily checks, goal 2-3  GI PPx: PPI  DNR- Made DNR, family wants escalation of care, pressors if needed.  F: No IVF in setting of HD  E: Replete electrolytes with caution in setting of HD  N: Jevity 1.5 goal rate 62cc/hr, per updated nutrition recs 5/21  Dispo: SD to [facility], likely chronic vent facility + HD, pending SW placement. Dispo plans and SD plans discussed with daughter Cristal

## 2018-06-06 NOTE — CHART NOTE - NSCHARTNOTEFT_GEN_A_CORE
Admitting Diagnosis:   63M PMH HFrEF 10-15% (ischemic), MI, s/p AICD vs PPM, possible Afib, HTN, DM2 on insulin, possible CKD, and gout, who presents with a chief complaint of generalized weakness s/p septic shock likely 2/2 LE cellulitis. AMS and new leukocytisis likely 2/2 UTI. Trach and PEG dependent. Continues to receive HD TIW. Stable on RMF.      PAST MEDICAL & SURGICAL HISTORY:  Type 2 diabetes mellitus with diabetic peripheral angiopathy without gangrene, with long-term curren  Essential hypertension, benign  Gout  Pacemaker  Chronic systolic heart failure  Myocardial infarction  No significant past surgical history    Current Nutrition Order:   Jevity 1.5 Terrell @ 62mL/hr x 24hrs plus ProStat Sugar Free BID (200 kcal, 30g protein) to provide in total: 1488 mL TV, 2432 kcal, 125g protein, 1131 mL free H2O, 148% RDI, 1.40g/kg IBW protein.      PO Intake: Good (%) [   ]  Fair (50-75%) [   ] Poor (<25%) [   ]- N/A NPO with TF    GI Issues: Good tolerance per RN    Pain: Unable to assess at this time 2/2 vent     Skin Integrity:  L buttock unstageable; R. buttock II , sacrum unstageable   L calf venous ulcer  Trach stage 3 PU  L. UE eschar     Labs:       140  |  97  |  39<H>  ----------------------------<  240<H>  4.7   |  24  |  2.75<H>    Ca    9.1      2018 07:25  Mg     2.2         TPro  8.5<H>  /  Alb  2.4<L>  /  TBili  0.5  /  DBili  x   /  AST  56<H>  /  ALT  78<H>  /  AlkPhos  545<H>      CAPILLARY BLOOD GLUCOSE      POCT Blood Glucose.: 279 mg/dL (2018 12:19)  POCT Blood Glucose.: 246 mg/dL (2018 06:53)  POCT Blood Glucose.: 359 mg/dL (2018 00:37)  POCT Blood Glucose.: 215 mg/dL (2018 18:01)      Medications:  MEDICATIONS  (STANDING):  acetaminophen    Suspension. 650 milliGRAM(s) Oral every 8 hours  acetylcysteine 20% Inhalation 4 milliLiter(s) Inhalation two times a day  aspirin  chewable 81 milliGRAM(s) Oral daily  atorvastatin 80 milliGRAM(s) Oral at bedtime  calcitriol  Solution 0.25 MICROGram(s) Oral daily  collagenase Ointment 1 Application(s) Topical daily  dextrose 5%. 1000 milliLiter(s) (50 mL/Hr) IV Continuous <Continuous>  dextrose 50% Injectable 12.5 Gram(s) IV Push once  dextrose 50% Injectable 25 Gram(s) IV Push once  dextrose 50% Injectable 25 Gram(s) IV Push once  escitalopram 5 milliGRAM(s) Oral daily  folic acid 1 milliGRAM(s) Oral daily  hydrocortisone 10 milliGRAM(s) Oral every 12 hours  insulin regular  human corrective regimen sliding scale   SubCutaneous <User Schedule>  insulin regular  human recombinant 13 Unit(s) SubCutaneous every 6 hours  levETIRAcetam  Solution 500 milliGRAM(s) Oral every 24 hours  metoclopramide 5 milliGRAM(s) Oral every 12 hours  midodrine 10 milliGRAM(s) Oral every 8 hours  modafinil 100 milliGRAM(s) Oral daily  multivitamin 1 Tablet(s) Oral daily  piperacillin/tazobactam IVPB. 2.25 Gram(s) IV Intermittent every 8 hours  warfarin 2.5 milliGRAM(s) Oral once    MEDICATIONS  (PRN):  dextrose Gel 1 Dose(s) Oral once PRN Blood Glucose LESS THAN 70 milliGRAM(s)/deciliter  glucagon  Injectable 1 milliGRAM(s) IntraMuscular once PRN Glucose LESS THAN 70 milligrams/deciliter      Weight:  Daily     Daily Weight in k.1 (2018 20:35)    Weight:  74.3kg ()  74.6kg ()  80.3kg ()  85.6kg ()  86.8kg ()  86.2kg (5/3)  85.1kg ()  79.1kg ()  75.9kg ()  77.4kg ()  72.7kg ()  77.2kg ()  80.9 kg ()  84.5kg (3/31)  86.9kg (3/19)  94.7 kg (3/16)    Weight Change:   Weight fluctuating throughout admission d/t HD, TF inconsistencies    Estimated energy needs using 89 kg IBW; Needs estimated / vent/post-op/PU/HD  Calories: 25-30 kcal/kg = 8351-5391 kcal/day  Protein: 1.4-1.6 g/kg = 125-142 g protein/day  Fluids: per team / HD     Subjective:   S/p trach and PEG placements on . S/p permacath replacement on . Stable on RMF at this time. Pt seen in room, asleep, not arousable to verbal stimuli, left to rest. Last HD . Trached to vent on VC/AC mode. TF running at current goal rate of 62mL/hr w/good tolerance. Concern for osteomyelitis in sacrum/hip d/t necrotic ulcers being infiltrated w/bowel. Palliative continues to follow. Still pending placement.     Previous Nutrition Diagnosis:   Increased protein-calorie needs RT increased demand for protein-calorie intake AEB on vent support, ESRD on HD, malnutrition     Active [X]  Resolved [   ]    New PES statement:     Goal:   Continue to meet % of nutrition needs via tolerated route.     Recommendations:  1. Continue w/current EN order; monitor for s/s intolerance, maintain aspiration precautions   2. Continue to trend weights  3 Monitor POC BG and provide adequate insulin coverage  4. Continue to monitor for GOC and keep nutrition in line at all times     Education:   N/A-vent    Risk Level: High [  ] Moderate [ X  ] Low [   ]

## 2018-06-06 NOTE — PROGRESS NOTE ADULT - SUBJECTIVE AND OBJECTIVE BOX
INTERVAL HPI/OVERNIGHT EVENTS:      VITAL SIGNS:  T(F): 97.7 (06 Jun 2018 05:32), Max: 98.1 (05 Jun 2018 21:49)  HR: 96 (06 Jun 2018 05:32) (86 - 110)  BP: 101/64 (06 Jun 2018 05:32) (88/57 - 106/73)  RR: 20 (06 Jun 2018 05:32) (18 - 23)  SpO2: 100% (06 Jun 2018 05:32) (100% - 100%)    I&O:  05 Jun 2018 07:01  -  06 Jun 2018 07:00  --------------------------------------------------------  IN: 894 mL / OUT: 2500 mL / NET: -1606 mL      PHYSICAL EXAM:  Constitutional: Patient lying in bed, no acute distress. Trach in situ, Ventilator Dependent.   HEENT: PERRLA  Neck: No LAD, No JVD  Respiratory: Normal air entry, no wheeze or crackles on auscultation.   Cardiovascular: S1 and S2 present - no additional abnormal sounds or murmurs. Normal rhythm and rate of pulse.   Gastrointestinal: BS+, soft, slightly distended and tympanic to percussion.   Extremities: No peripheral edema  Vascular: 2+ peripheral pulses  Neurological: A/O x 3, CN V-XII grossly intact.  Psychiatric: Normal mood, normal affect  Musculoskeletal: 5/5 strength b/l upper and lower extremities  Skin: B/l venous stasis changes present.     MEDICATIONS  (STANDING):  acetaminophen    Suspension. 650 milliGRAM(s) Oral every 8 hours  acetylcysteine 20% Inhalation 4 milliLiter(s) Inhalation two times a day  aspirin  chewable 81 milliGRAM(s) Oral daily  atorvastatin 80 milliGRAM(s) Oral at bedtime  calcitriol  Solution 0.25 MICROGram(s) Oral daily  collagenase Ointment 1 Application(s) Topical daily  dextrose 5%. 1000 milliLiter(s) (50 mL/Hr) IV Continuous <Continuous>  dextrose 50% Injectable 12.5 Gram(s) IV Push once  dextrose 50% Injectable 25 Gram(s) IV Push once  dextrose 50% Injectable 25 Gram(s) IV Push once  escitalopram 5 milliGRAM(s) Oral daily  folic acid 1 milliGRAM(s) Oral daily  hydrocortisone 10 milliGRAM(s) Oral every 12 hours  insulin regular  human corrective regimen sliding scale   SubCutaneous every 6 hours  insulin regular  human recombinant 13 Unit(s) SubCutaneous every 6 hours  levETIRAcetam  Solution 500 milliGRAM(s) Oral every 24 hours  metoclopramide 5 milliGRAM(s) Oral every 12 hours  midodrine 10 milliGRAM(s) Oral every 8 hours  modafinil 100 milliGRAM(s) Oral daily  multivitamin 1 Tablet(s) Oral daily  warfarin 2.5 milliGRAM(s) Oral at bedtime    MEDICATIONS  (PRN):  dextrose Gel 1 Dose(s) Oral once PRN Blood Glucose LESS THAN 70 milliGRAM(s)/deciliter  glucagon  Injectable 1 milliGRAM(s) IntraMuscular once PRN Glucose LESS THAN 70 milligrams/deciliter      Allergies    No Known Allergies    Intolerances      LABS:                        7.8    23.0  )-----------( 259      ( 06 Jun 2018 07:25 )             26.9       140  |  97  |  39<H>  ----------------------------<  240<H>  4.7   |  24  |  2.75<H>    Ca    9.1      06 Jun 2018 07:25  Mg     2.2     06-06    TPro  8.5<H>  /  Alb  2.4<L>  /  TBili  0.5  /  DBili  x   /  AST  56<H>  /  ALT  78<H>  /  AlkPhos  545<H>  06-04    PT/INR - ( 06 Jun 2018 07:25 )   PT: 26.5 sec;   INR: 2.35        PTT - ( 04 Jun 2018 12:55 )  PTT:41.0 sec    RADIOLOGY & ADDITIONAL TESTS:  Pending MR Hip and MR Pelvis. INTERVAL HPI/OVERNIGHT EVENTS:  No overnight events.   Patient is currently on Zosyn for treatment of infection - sources include urine and sacral ulcer leading to OM.   Urine Culture growing yeast.   BCx NGTD.   Patient without fevers, WBC.   Now with elevated sugars - possibility that quelling infection has led to improvement - will return to prior insulin dose.     VITAL SIGNS:  T(F): 97.7 (06 Jun 2018 05:32), Max: 98.1 (05 Jun 2018 21:49)  HR: 96 (06 Jun 2018 05:32) (86 - 110)  BP: 101/64 (06 Jun 2018 05:32) (88/57 - 106/73)  RR: 20 (06 Jun 2018 05:32) (18 - 23)  SpO2: 100% (06 Jun 2018 05:32) (100% - 100%)    I&O:  05 Jun 2018 07:01  -  06 Jun 2018 07:00  --------------------------------------------------------  IN: 894 mL / OUT: 2500 mL / NET: -1606 mL      PHYSICAL EXAM:  Constitutional: Patient lying in bed, no acute distress. Trach in situ, Ventilator Dependent.   HEENT: PERRLA  Neck: No LAD, No JVD  Respiratory: Normal air entry, no wheeze or crackles on auscultation.   Cardiovascular: S1 and S2 present - no additional abnormal sounds or murmurs. Normal rhythm and rate of pulse.   Gastrointestinal: BS+, soft, slightly distended and tympanic to percussion.   Extremities: No peripheral edema  Vascular: 2+ peripheral pulses  Neurological: A/O x 3, CN V-XII grossly intact.  Psychiatric: Normal mood, normal affect  Musculoskeletal: 5/5 strength b/l upper and lower extremities  Skin: B/l venous stasis changes present.     MEDICATIONS  (STANDING):  acetaminophen    Suspension. 650 milliGRAM(s) Oral every 8 hours  acetylcysteine 20% Inhalation 4 milliLiter(s) Inhalation two times a day  aspirin  chewable 81 milliGRAM(s) Oral daily  atorvastatin 80 milliGRAM(s) Oral at bedtime  calcitriol  Solution 0.25 MICROGram(s) Oral daily  collagenase Ointment 1 Application(s) Topical daily  dextrose 5%. 1000 milliLiter(s) (50 mL/Hr) IV Continuous <Continuous>  dextrose 50% Injectable 12.5 Gram(s) IV Push once  dextrose 50% Injectable 25 Gram(s) IV Push once  dextrose 50% Injectable 25 Gram(s) IV Push once  escitalopram 5 milliGRAM(s) Oral daily  folic acid 1 milliGRAM(s) Oral daily  hydrocortisone 10 milliGRAM(s) Oral every 12 hours  insulin regular  human corrective regimen sliding scale   SubCutaneous every 6 hours  insulin regular  human recombinant 13 Unit(s) SubCutaneous every 6 hours  levETIRAcetam  Solution 500 milliGRAM(s) Oral every 24 hours  metoclopramide 5 milliGRAM(s) Oral every 12 hours  midodrine 10 milliGRAM(s) Oral every 8 hours  modafinil 100 milliGRAM(s) Oral daily  multivitamin 1 Tablet(s) Oral daily  warfarin 2.5 milliGRAM(s) Oral at bedtime    MEDICATIONS  (PRN):  dextrose Gel 1 Dose(s) Oral once PRN Blood Glucose LESS THAN 70 milliGRAM(s)/deciliter  glucagon  Injectable 1 milliGRAM(s) IntraMuscular once PRN Glucose LESS THAN 70 milligrams/deciliter      Allergies    No Known Allergies    Intolerances      LABS:                        7.8    23.0  )-----------( 259      ( 06 Jun 2018 07:25 )             26.9       140  |  97  |  39<H>  ----------------------------<  240<H>  4.7   |  24  |  2.75<H>    Ca    9.1      06 Jun 2018 07:25  Mg     2.2     06-06    TPro  8.5<H>  /  Alb  2.4<L>  /  TBili  0.5  /  DBili  x   /  AST  56<H>  /  ALT  78<H>  /  AlkPhos  545<H>  06-04    PT/INR - ( 06 Jun 2018 07:25 )   PT: 26.5 sec;   INR: 2.35        PTT - ( 04 Jun 2018 12:55 )  PTT:41.0 sec    RADIOLOGY & ADDITIONAL TESTS:  Pending MR Hip and MR Pelvis.

## 2018-06-06 NOTE — PROGRESS NOTE ADULT - PROBLEM SELECTOR PLAN 1
- Patient treated for Sacral Decub growing Pseudomonas for 7 days with IV Zosyn - since stopping, persistently elevating leukocytosis  - Low grade temp rectally 100.3 F (6/5)  - Will persist with full skin exam - new purulence in ulcer - to follow up with MRI Pelvis and Hip to assess for Osteo   - Repeat Blood Cx, f/u   - F/u urine culture  - CXR for the most part unchanged.     #Fungemia  RESOLVED. Noted to have Candida Tropicalis growing in blood cultures and completed fluconazole course. - Patient treated for Sacral Decub growing Pseudomonas for 7 days with IV Zosyn - since stopping, persistently elevating leukocytosis  - Low grade temp rectally 100.3 F (6/5)  - Will persist with full skin exam - new purulence in ulcer - to follow up with MRI Pelvis and Hip to assess for Osteo   - Repeat Blood Cx, NGTD  - UCx: Yeast  - CXR for the most part unchanged.     #Fungemia  Noted to have Candida Tropicalis growing in blood cultures and completed fluconazole course.

## 2018-06-06 NOTE — PROGRESS NOTE ADULT - PROBLEM SELECTOR PLAN 1
Patient is a 63 year old male with acute renal failure from ischemic ATN now requiring hemodialysis. Patient was last dialyzed on 6/5 with UF of 2.5L     P - No need for emergent dialysis today as volume status is acceptable   Anticipate next dialysis tomorrow

## 2018-06-06 NOTE — PROGRESS NOTE ADULT - PROBLEM SELECTOR PLAN 3
DM2 on Januvia at home. HbA1C 8.6. Difficulty controlling glucose levels with multiple episodes of hypoglycemia and hyperglycemia. Symptomatically hypoglycemic with isolated evening F/S 50-60s.   - F/u insulin level and C-peptide   - C/w regular insulin 13u q 6 hrs  - ISS DM2 on Januvia at home. HbA1C 8.6  - Initially hypoglycemic episodes - likely in setting of infection, now F/S 200-300s.  - Revert back to regular insulin 13u q 6 hrs  - ISS

## 2018-06-07 DIAGNOSIS — D72.829 ELEVATED WHITE BLOOD CELL COUNT, UNSPECIFIED: ICD-10-CM

## 2018-06-07 LAB
ANION GAP SERPL CALC-SCNC: 20 MMOL/L — HIGH (ref 5–17)
BUN SERPL-MCNC: 53 MG/DL — HIGH (ref 7–23)
CALCIUM SERPL-MCNC: 9.3 MG/DL — SIGNIFICANT CHANGE UP (ref 8.4–10.5)
CHLORIDE SERPL-SCNC: 95 MMOL/L — LOW (ref 96–108)
CO2 SERPL-SCNC: 24 MMOL/L — SIGNIFICANT CHANGE UP (ref 22–31)
CREAT SERPL-MCNC: 3.65 MG/DL — HIGH (ref 0.5–1.3)
CULTURE RESULTS: SIGNIFICANT CHANGE UP
GLUCOSE BLDC GLUCOMTR-MCNC: 115 MG/DL — HIGH (ref 70–99)
GLUCOSE BLDC GLUCOMTR-MCNC: 127 MG/DL — HIGH (ref 70–99)
GLUCOSE BLDC GLUCOMTR-MCNC: 148 MG/DL — HIGH (ref 70–99)
GLUCOSE BLDC GLUCOMTR-MCNC: 163 MG/DL — HIGH (ref 70–99)
GLUCOSE BLDC GLUCOMTR-MCNC: 184 MG/DL — HIGH (ref 70–99)
GLUCOSE BLDC GLUCOMTR-MCNC: 187 MG/DL — HIGH (ref 70–99)
GLUCOSE BLDC GLUCOMTR-MCNC: 220 MG/DL — HIGH (ref 70–99)
GLUCOSE SERPL-MCNC: 158 MG/DL — HIGH (ref 70–99)
HCT VFR BLD CALC: 28.4 % — LOW (ref 39–50)
HGB BLD-MCNC: 8.2 G/DL — LOW (ref 13–17)
INR BLD: 2.38 — HIGH (ref 0.88–1.16)
MAGNESIUM SERPL-MCNC: 2.1 MG/DL — SIGNIFICANT CHANGE UP (ref 1.6–2.6)
MCHC RBC-ENTMCNC: 25.7 PG — LOW (ref 27–34)
MCHC RBC-ENTMCNC: 28.9 G/DL — LOW (ref 32–36)
MCV RBC AUTO: 89 FL — SIGNIFICANT CHANGE UP (ref 80–100)
PLATELET # BLD AUTO: 295 K/UL — SIGNIFICANT CHANGE UP (ref 150–400)
POTASSIUM SERPL-MCNC: 5.6 MMOL/L — HIGH (ref 3.5–5.3)
POTASSIUM SERPL-SCNC: 5.6 MMOL/L — HIGH (ref 3.5–5.3)
PROTHROM AB SERPL-ACNC: 26.9 SEC — HIGH (ref 9.8–12.7)
RBC # BLD: 3.19 M/UL — LOW (ref 4.2–5.8)
RBC # FLD: 18.8 % — HIGH (ref 10.3–16.9)
SODIUM SERPL-SCNC: 139 MMOL/L — SIGNIFICANT CHANGE UP (ref 135–145)
SPECIMEN SOURCE: SIGNIFICANT CHANGE UP
WBC # BLD: 31.5 K/UL — HIGH (ref 3.8–10.5)
WBC # FLD AUTO: 31.5 K/UL — HIGH (ref 3.8–10.5)

## 2018-06-07 PROCEDURE — 99233 SBSQ HOSP IP/OBS HIGH 50: CPT | Mod: GC

## 2018-06-07 PROCEDURE — 90937 HEMODIALYSIS REPEATED EVAL: CPT | Mod: GC

## 2018-06-07 RX ORDER — DOXERCALCIFEROL 2.5 UG/1
2 CAPSULE ORAL ONCE
Qty: 0 | Refills: 0 | Status: COMPLETED | OUTPATIENT
Start: 2018-06-07 | End: 2018-06-07

## 2018-06-07 RX ORDER — ACETAMINOPHEN 500 MG
1000 TABLET ORAL ONCE
Qty: 0 | Refills: 0 | Status: COMPLETED | OUTPATIENT
Start: 2018-06-07 | End: 2018-06-07

## 2018-06-07 RX ORDER — WARFARIN SODIUM 2.5 MG/1
2.5 TABLET ORAL ONCE
Qty: 0 | Refills: 0 | Status: COMPLETED | OUTPATIENT
Start: 2018-06-07 | End: 2018-06-07

## 2018-06-07 RX ORDER — FLUCONAZOLE 150 MG/1
200 TABLET ORAL EVERY 24 HOURS
Qty: 0 | Refills: 0 | Status: DISCONTINUED | OUTPATIENT
Start: 2018-06-08 | End: 2018-06-21

## 2018-06-07 RX ORDER — FLUCONAZOLE 150 MG/1
800 TABLET ORAL ONCE
Qty: 0 | Refills: 0 | Status: COMPLETED | OUTPATIENT
Start: 2018-06-07 | End: 2018-06-07

## 2018-06-07 RX ORDER — SODIUM HYPOCHLORITE 0.125 %
1 SOLUTION, NON-ORAL MISCELLANEOUS DAILY
Qty: 0 | Refills: 0 | Status: COMPLETED | OUTPATIENT
Start: 2018-06-07 | End: 2018-06-15

## 2018-06-07 RX ORDER — FLUCONAZOLE 150 MG/1
800 TABLET ORAL ONCE
Qty: 0 | Refills: 0 | Status: DISCONTINUED | OUTPATIENT
Start: 2018-06-07 | End: 2018-06-07

## 2018-06-07 RX ORDER — ERYTHROPOIETIN 10000 [IU]/ML
10000 INJECTION, SOLUTION INTRAVENOUS; SUBCUTANEOUS ONCE
Qty: 0 | Refills: 0 | Status: COMPLETED | OUTPATIENT
Start: 2018-06-07 | End: 2018-06-07

## 2018-06-07 RX ORDER — INSULIN HUMAN 100 [IU]/ML
8 INJECTION, SOLUTION SUBCUTANEOUS ONCE
Qty: 0 | Refills: 0 | Status: COMPLETED | OUTPATIENT
Start: 2018-06-07 | End: 2018-06-07

## 2018-06-07 RX ADMIN — INSULIN HUMAN 1: 100 INJECTION, SOLUTION SUBCUTANEOUS at 06:09

## 2018-06-07 RX ADMIN — Medication 1 MILLIGRAM(S): at 14:43

## 2018-06-07 RX ADMIN — Medication 650 MILLIGRAM(S): at 14:43

## 2018-06-07 RX ADMIN — PIPERACILLIN AND TAZOBACTAM 200 GRAM(S): 4; .5 INJECTION, POWDER, LYOPHILIZED, FOR SOLUTION INTRAVENOUS at 15:32

## 2018-06-07 RX ADMIN — Medication 1 APPLICATION(S): at 14:44

## 2018-06-07 RX ADMIN — LEVETIRACETAM 500 MILLIGRAM(S): 250 TABLET, FILM COATED ORAL at 14:44

## 2018-06-07 RX ADMIN — Medication 4 MILLILITER(S): at 06:10

## 2018-06-07 RX ADMIN — Medication 10 MILLIGRAM(S): at 09:35

## 2018-06-07 RX ADMIN — CALCITRIOL 0.25 MICROGRAM(S): 0.5 CAPSULE ORAL at 14:43

## 2018-06-07 RX ADMIN — Medication 650 MILLIGRAM(S): at 22:09

## 2018-06-07 RX ADMIN — ESCITALOPRAM OXALATE 5 MILLIGRAM(S): 10 TABLET, FILM COATED ORAL at 14:43

## 2018-06-07 RX ADMIN — MIDODRINE HYDROCHLORIDE 10 MILLIGRAM(S): 2.5 TABLET ORAL at 14:55

## 2018-06-07 RX ADMIN — MIDODRINE HYDROCHLORIDE 10 MILLIGRAM(S): 2.5 TABLET ORAL at 06:16

## 2018-06-07 RX ADMIN — Medication 81 MILLIGRAM(S): at 14:43

## 2018-06-07 RX ADMIN — Medication 5 MILLIGRAM(S): at 06:11

## 2018-06-07 RX ADMIN — ATORVASTATIN CALCIUM 80 MILLIGRAM(S): 80 TABLET, FILM COATED ORAL at 22:02

## 2018-06-07 RX ADMIN — Medication 650 MILLIGRAM(S): at 15:13

## 2018-06-07 RX ADMIN — MIDODRINE HYDROCHLORIDE 10 MILLIGRAM(S): 2.5 TABLET ORAL at 19:01

## 2018-06-07 RX ADMIN — FLUCONAZOLE 100 MILLIGRAM(S): 150 TABLET ORAL at 14:43

## 2018-06-07 RX ADMIN — INSULIN HUMAN 13 UNIT(S): 100 INJECTION, SOLUTION SUBCUTANEOUS at 06:09

## 2018-06-07 RX ADMIN — WARFARIN SODIUM 2.5 MILLIGRAM(S): 2.5 TABLET ORAL at 22:01

## 2018-06-07 RX ADMIN — MODAFINIL 100 MILLIGRAM(S): 200 TABLET ORAL at 14:43

## 2018-06-07 RX ADMIN — DOXERCALCIFEROL 2 MICROGRAM(S): 2.5 CAPSULE ORAL at 11:07

## 2018-06-07 RX ADMIN — Medication 5 MILLIGRAM(S): at 19:01

## 2018-06-07 RX ADMIN — Medication 4 MILLILITER(S): at 19:01

## 2018-06-07 RX ADMIN — ERYTHROPOIETIN 10000 UNIT(S): 10000 INJECTION, SOLUTION INTRAVENOUS; SUBCUTANEOUS at 11:08

## 2018-06-07 RX ADMIN — Medication 1 TABLET(S): at 14:43

## 2018-06-07 RX ADMIN — Medication 10 MILLIGRAM(S): at 22:01

## 2018-06-07 RX ADMIN — PIPERACILLIN AND TAZOBACTAM 200 GRAM(S): 4; .5 INJECTION, POWDER, LYOPHILIZED, FOR SOLUTION INTRAVENOUS at 06:10

## 2018-06-07 RX ADMIN — Medication 1 APPLICATION(S): at 14:54

## 2018-06-07 RX ADMIN — INSULIN HUMAN 13 UNIT(S): 100 INJECTION, SOLUTION SUBCUTANEOUS at 19:00

## 2018-06-07 RX ADMIN — PIPERACILLIN AND TAZOBACTAM 200 GRAM(S): 4; .5 INJECTION, POWDER, LYOPHILIZED, FOR SOLUTION INTRAVENOUS at 22:01

## 2018-06-07 RX ADMIN — INSULIN HUMAN 1: 100 INJECTION, SOLUTION SUBCUTANEOUS at 19:13

## 2018-06-07 RX ADMIN — Medication 650 MILLIGRAM(S): at 23:09

## 2018-06-07 RX ADMIN — INSULIN HUMAN 13 UNIT(S): 100 INJECTION, SOLUTION SUBCUTANEOUS at 14:44

## 2018-06-07 RX ADMIN — INSULIN HUMAN 8 UNIT(S): 100 INJECTION, SOLUTION SUBCUTANEOUS at 01:17

## 2018-06-07 RX ADMIN — Medication 650 MILLIGRAM(S): at 06:16

## 2018-06-07 RX ADMIN — Medication 400 MILLIGRAM(S): at 09:35

## 2018-06-07 RX ADMIN — Medication 650 MILLIGRAM(S): at 06:10

## 2018-06-07 NOTE — PROGRESS NOTE ADULT - PROBLEM SELECTOR PLAN 3
Low K/Low phos/renal feeding  Continue Hectorol during dialysis  Please check a repeat iPTH and serum phosphorous level with next set of labs

## 2018-06-07 NOTE — PROGRESS NOTE ADULT - SUBJECTIVE AND OBJECTIVE BOX
INTERVAL HPI/OVERNIGHT EVENTS:    CONSTITUTIONAL:  Negative fever or chills, feels well, good appetite  EYES:  Negative  blurry vision or double vision  CARDIOVASCULAR:  Negative for chest pain or palpitations  RESPIRATORY:  Negative for cough, wheezing, or SOB   GASTROINTESTINAL:  Negative for nausea, vomiting, diarrhea, constipation, or abdominal pain  GENITOURINARY:  Negative frequency, urgency or dysuria  NEUROLOGIC:  No headache, confusion, dizziness, lightheadedness      ANTIBIOTICS/RELEVANT:    MEDICATIONS  (STANDING):  acetaminophen    Suspension. 650 milliGRAM(s) Oral every 8 hours  acetaminophen  IVPB 1000 milliGRAM(s) IV Intermittent once  acetylcysteine 20% Inhalation 4 milliLiter(s) Inhalation two times a day  aspirin  chewable 81 milliGRAM(s) Oral daily  atorvastatin 80 milliGRAM(s) Oral at bedtime  calcitriol  Solution 0.25 MICROGram(s) Oral daily  collagenase Ointment 1 Application(s) Topical daily  dextrose 5%. 1000 milliLiter(s) (50 mL/Hr) IV Continuous <Continuous>  dextrose 50% Injectable 12.5 Gram(s) IV Push once  dextrose 50% Injectable 25 Gram(s) IV Push once  dextrose 50% Injectable 25 Gram(s) IV Push once  doxercalciferol Injectable 2 MICROGram(s) IV Push once  epoetin amy Injectable 79256 Unit(s) IV Push once  escitalopram 5 milliGRAM(s) Oral daily  fluconAZOLE IVPB 800 milliGRAM(s) IV Intermittent once  folic acid 1 milliGRAM(s) Oral daily  hydrocortisone 10 milliGRAM(s) Oral every 12 hours  insulin regular  human corrective regimen sliding scale   SubCutaneous <User Schedule>  insulin regular  human recombinant 13 Unit(s) SubCutaneous every 6 hours  levETIRAcetam  Solution 500 milliGRAM(s) Oral every 24 hours  metoclopramide 5 milliGRAM(s) Oral every 12 hours  midodrine 10 milliGRAM(s) Oral every 8 hours  modafinil 100 milliGRAM(s) Oral daily  multivitamin 1 Tablet(s) Oral daily  piperacillin/tazobactam IVPB. 2.25 Gram(s) IV Intermittent every 8 hours  warfarin 2.5 milliGRAM(s) Oral once    MEDICATIONS  (PRN):  dextrose Gel 1 Dose(s) Oral once PRN Blood Glucose LESS THAN 70 milliGRAM(s)/deciliter  glucagon  Injectable 1 milliGRAM(s) IntraMuscular once PRN Glucose LESS THAN 70 milligrams/deciliter        Vital Signs Last 24 Hrs  T(C): 38.3 (07 Jun 2018 08:30), Max: 39.2 (06 Jun 2018 21:19)  T(F): 100.9 (07 Jun 2018 08:30), Max: 102.5 (06 Jun 2018 21:19)  HR: 106 (07 Jun 2018 08:29) (76 - 123)  BP: 106/77 (07 Jun 2018 08:29) (94/65 - 106/77)  BP(mean): --  RR: 20 (07 Jun 2018 08:29) (12 - 27)  SpO2: 96% (07 Jun 2018 08:29) (96% - 100%)    PHYSICAL EXAM:  Constitutional:Well-developed, well nourished  Eyes:MAXI, EOMI  Ear/Nose/Throat: no oral lesion, no sinus tenderness on percussion	  Neck:no JVD, no lymphadenopathy, supple  Respiratory: CTA morris  Cardiovascular: S1S2 RRR, no murmurs  Gastrointestinal:soft, (+) BS, no HSM  Extremities:no e/e/c  Vascular: DP Pulse:	right normal; left normal      LABS:                        8.2    31.5  )-----------( 295      ( 07 Jun 2018 06:52 )             28.4     06-07    139  |  95<L>  |  53<H>  ----------------------------<  158<H>  5.6<H>   |  24  |  3.65<H>    Ca    9.3      07 Jun 2018 06:50  Mg     2.2     06-06      PT/INR - ( 07 Jun 2018 06:51 )   PT: 26.9 sec;   INR: 2.38                MICROBIOLOGY:    RADIOLOGY & ADDITIONAL STUDIES: INTERVAL HPI/OVERNIGHT EVENTS:    Patient seen and examined at bedside. patient intermittently following commands but unable to voice complainrts     ROS: cannot obtain      ANTIBIOTICS/RELEVANT:    MEDICATIONS  (STANDING):  acetaminophen    Suspension. 650 milliGRAM(s) Oral every 8 hours  acetaminophen  IVPB 1000 milliGRAM(s) IV Intermittent once  acetylcysteine 20% Inhalation 4 milliLiter(s) Inhalation two times a day  aspirin  chewable 81 milliGRAM(s) Oral daily  atorvastatin 80 milliGRAM(s) Oral at bedtime  calcitriol  Solution 0.25 MICROGram(s) Oral daily  collagenase Ointment 1 Application(s) Topical daily  dextrose 5%. 1000 milliLiter(s) (50 mL/Hr) IV Continuous <Continuous>  dextrose 50% Injectable 12.5 Gram(s) IV Push once  dextrose 50% Injectable 25 Gram(s) IV Push once  dextrose 50% Injectable 25 Gram(s) IV Push once  doxercalciferol Injectable 2 MICROGram(s) IV Push once  epoetin amy Injectable 17762 Unit(s) IV Push once  escitalopram 5 milliGRAM(s) Oral daily  fluconAZOLE IVPB 800 milliGRAM(s) IV Intermittent once  folic acid 1 milliGRAM(s) Oral daily  hydrocortisone 10 milliGRAM(s) Oral every 12 hours  insulin regular  human corrective regimen sliding scale   SubCutaneous <User Schedule>  insulin regular  human recombinant 13 Unit(s) SubCutaneous every 6 hours  levETIRAcetam  Solution 500 milliGRAM(s) Oral every 24 hours  metoclopramide 5 milliGRAM(s) Oral every 12 hours  midodrine 10 milliGRAM(s) Oral every 8 hours  modafinil 100 milliGRAM(s) Oral daily  multivitamin 1 Tablet(s) Oral daily  piperacillin/tazobactam IVPB. 2.25 Gram(s) IV Intermittent every 8 hours  warfarin 2.5 milliGRAM(s) Oral once    MEDICATIONS  (PRN):  dextrose Gel 1 Dose(s) Oral once PRN Blood Glucose LESS THAN 70 milliGRAM(s)/deciliter  glucagon  Injectable 1 milliGRAM(s) IntraMuscular once PRN Glucose LESS THAN 70 milligrams/deciliter        Vital Signs Last 24 Hrs  T(C): 38.3 (07 Jun 2018 08:30), Max: 39.2 (06 Jun 2018 21:19)  T(F): 100.9 (07 Jun 2018 08:30), Max: 102.5 (06 Jun 2018 21:19)  HR: 106 (07 Jun 2018 08:29) (76 - 123)  BP: 106/77 (07 Jun 2018 08:29) (94/65 - 106/77)  BP(mean): --  RR: 20 (07 Jun 2018 08:29) (12 - 27)  SpO2: 96% (07 Jun 2018 08:29) (96% - 100%)    PHYSICAL EXAM:  Constitutional: chronically ill-appearing, currently on AC-VC  Eyes:MAXI, EOMI  Ear/Nose/Throat: no oral lesion, no sinus tenderness on percussion	  Neck:no JVD, no lymphadenopathy, supple  Respiratory: decreased BS at bases secondary to poor effort   Cardiovascular: S1S2 RRR, no murmurs  Gastrointestinal: soft, (+) BS, no HSM  Extremities:no e/e/c; atrophy  Skin: deep stage 4 sacral decubitus ulcer to bone with some discharge and fecal residue      LABS:                        8.2    31.5  )-----------( 295      ( 07 Jun 2018 06:52 )             28.4     06-07    139  |  95<L>  |  53<H>  ----------------------------<  158<H>  5.6<H>   |  24  |  3.65<H>    Ca    9.3      07 Jun 2018 06:50  Mg     2.2     06-06      PT/INR - ( 07 Jun 2018 06:51 )   PT: 26.9 sec;   INR: 2.38                MICROBIOLOGY:    Culture - Urine (06.04.18 @ 13:45)    Specimen Source: .Urine Catheterized    Culture Results:   >100,000 CFU/ml Yeast  Pending further identification at [facility]  Antibiotic susceptibility testing is performed at request only by calling  24/7 at [phone]    Culture - Blood (06.04.18 @ 13:29)    Specimen Source: .Blood 2 Sets Of Blood Cultures    Culture Results:   No growth at 2 days.        RADIOLOGY & ADDITIONAL STUDIES: pending MRI     A/P: 64 yo male with prolonged hospital course, C. 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 Patient now with elevated leuckocytosis and persistent fevers; with urine culture growing yeast, there is a concern for candida and possibly candidemia.   - start fluconazole 200 mg QD; if patient decompensates, consider micafungin  - repeat bcx in setting of fever  - pelvic/hip MRI ordered to evaluate for underlying osteomyelitis and abscess pending   - continue Zosyn 2.25g IV q8h INTERVAL HPI/OVERNIGHT EVENTS:    Patient seen and examined at bedside. patient intermittently following commands but unable to voice complainrts     ROS: cannot obtain      ANTIBIOTICS/RELEVANT:    MEDICATIONS  (STANDING):  acetaminophen    Suspension. 650 milliGRAM(s) Oral every 8 hours  acetaminophen  IVPB 1000 milliGRAM(s) IV Intermittent once  acetylcysteine 20% Inhalation 4 milliLiter(s) Inhalation two times a day  aspirin  chewable 81 milliGRAM(s) Oral daily  atorvastatin 80 milliGRAM(s) Oral at bedtime  calcitriol  Solution 0.25 MICROGram(s) Oral daily  collagenase Ointment 1 Application(s) Topical daily  dextrose 5%. 1000 milliLiter(s) (50 mL/Hr) IV Continuous <Continuous>  dextrose 50% Injectable 12.5 Gram(s) IV Push once  dextrose 50% Injectable 25 Gram(s) IV Push once  dextrose 50% Injectable 25 Gram(s) IV Push once  doxercalciferol Injectable 2 MICROGram(s) IV Push once  epoetin amy Injectable 47888 Unit(s) IV Push once  escitalopram 5 milliGRAM(s) Oral daily  fluconAZOLE IVPB 800 milliGRAM(s) IV Intermittent once  folic acid 1 milliGRAM(s) Oral daily  hydrocortisone 10 milliGRAM(s) Oral every 12 hours  insulin regular  human corrective regimen sliding scale   SubCutaneous <User Schedule>  insulin regular  human recombinant 13 Unit(s) SubCutaneous every 6 hours  levETIRAcetam  Solution 500 milliGRAM(s) Oral every 24 hours  metoclopramide 5 milliGRAM(s) Oral every 12 hours  midodrine 10 milliGRAM(s) Oral every 8 hours  modafinil 100 milliGRAM(s) Oral daily  multivitamin 1 Tablet(s) Oral daily  piperacillin/tazobactam IVPB. 2.25 Gram(s) IV Intermittent every 8 hours  warfarin 2.5 milliGRAM(s) Oral once    MEDICATIONS  (PRN):  dextrose Gel 1 Dose(s) Oral once PRN Blood Glucose LESS THAN 70 milliGRAM(s)/deciliter  glucagon  Injectable 1 milliGRAM(s) IntraMuscular once PRN Glucose LESS THAN 70 milligrams/deciliter        Vital Signs Last 24 Hrs  T(C): 38.3 (07 Jun 2018 08:30), Max: 39.2 (06 Jun 2018 21:19)  T(F): 100.9 (07 Jun 2018 08:30), Max: 102.5 (06 Jun 2018 21:19)  HR: 106 (07 Jun 2018 08:29) (76 - 123)  BP: 106/77 (07 Jun 2018 08:29) (94/65 - 106/77)  BP(mean): --  RR: 20 (07 Jun 2018 08:29) (12 - 27)  SpO2: 96% (07 Jun 2018 08:29) (96% - 100%)    PHYSICAL EXAM:  Constitutional: chronically ill-appearing, currently on AC-VC  Eyes:MAXI, EOMI  Ear/Nose/Throat: no oral lesion, no sinus tenderness on percussion	  Neck:no JVD, no lymphadenopathy, supple  Respiratory: decreased BS at bases secondary to poor effort   Cardiovascular: S1S2 RRR, no murmurs  Gastrointestinal: soft, (+) BS, no HSM  Extremities:no e/e/c; atrophy  Skin: deep stage 4 sacral decubitus ulcer to bone with some discharge and fecal residue      LABS:                        8.2    31.5  )-----------( 295      ( 07 Jun 2018 06:52 )             28.4     06-07    139  |  95<L>  |  53<H>  ----------------------------<  158<H>  5.6<H>   |  24  |  3.65<H>    Ca    9.3      07 Jun 2018 06:50  Mg     2.2     06-06      PT/INR - ( 07 Jun 2018 06:51 )   PT: 26.9 sec;   INR: 2.38                MICROBIOLOGY:    Culture - Urine (06.04.18 @ 13:45)    Specimen Source: .Urine Catheterized    Culture Results:   >100,000 CFU/ml Yeast  Pending further identification at [facility]  Antibiotic susceptibility testing is performed at request only by calling  24/7 at [phone]    Culture - Blood (06.04.18 @ 13:29)    Specimen Source: .Blood 2 Sets Of Blood Cultures    Culture Results:   No growth at 2 days.        RADIOLOGY & ADDITIONAL STUDIES: pending MRI     A/P: 62 yo male with prolonged hospital course, C. 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 Patient now with elevated leuckocytosis and worsening fever, Candida UTI.  - start fluconazole 200 mg IV q24h  - repeat bcx in setting of fever  - pelvic/hip MRI ordered to evaluate for underlying osteomyelitis and abscess pending   - continue Zosyn 2.25g IV q8h

## 2018-06-07 NOTE — PROGRESS NOTE ADULT - PROBLEM SELECTOR PLAN 3
DM2 on Januvia at home. HbA1C 8.6  - Initially hypoglycemic episodes - likely in setting of infection, now F/S 200-300s.  - Revert back to regular insulin 13u q 6 hrs  - ISS

## 2018-06-07 NOTE — PROGRESS NOTE ADULT - PROBLEM SELECTOR PLAN 1
Patient is a 63 year old male with acute renal failure from ischemic ATN now requiring hemodialysis. Patient was last dialyzed on 6/5 with UF of 2.5L     P - Dialysis today   Revaclear 400, , , 2K bath   Goal UF 2.5 kg over 3.5 hours

## 2018-06-07 NOTE — PROGRESS NOTE ADULT - SUBJECTIVE AND OBJECTIVE BOX
INTERVAL HPI/OVERNIGHT EVENTS:      VITAL SIGNS:  T(F): 98 (07 Jun 2018 10:15), Max: 102.5 (06 Jun 2018 21:19)  HR: 82 (07 Jun 2018 10:15) (76 - 123)  BP: 96/68 (07 Jun 2018 10:15) (88/56 - 106/77)  RR: 29 (07 Jun 2018 10:15) (12 - 29)  SpO2: 99% (07 Jun 2018 10:15) (96% - 100%)    PHYSICAL EXAM:  Constitutional: Patient lying in bed, no acute distress. Trach in situ, Ventilator Dependent.   HEENT: PERRLA  Neck: No LAD, No JVD  Respiratory: Normal air entry, no wheeze or crackles on auscultation.   Cardiovascular: S1 and S2 present - no additional abnormal sounds or murmurs. Normal rhythm and rate of pulse.   Gastrointestinal: BS+, soft, slightly distended and tympanic to percussion.   Extremities: No peripheral edema  Vascular: 2+ peripheral pulses  Neurological: A/O x 3, CN V-XII grossly intact.  Psychiatric: Normal mood, normal affect  Musculoskeletal: 5/5 strength b/l upper and lower extremities  Skin: B/l venous stasis changes present.     MEDICATIONS  (STANDING):  acetaminophen    Suspension. 650 milliGRAM(s) Oral every 8 hours  acetylcysteine 20% Inhalation 4 milliLiter(s) Inhalation two times a day  aspirin  chewable 81 milliGRAM(s) Oral daily  atorvastatin 80 milliGRAM(s) Oral at bedtime  calcitriol  Solution 0.25 MICROGram(s) Oral daily  collagenase Ointment 1 Application(s) Topical daily  dextrose 5%. 1000 milliLiter(s) (50 mL/Hr) IV Continuous <Continuous>  dextrose 50% Injectable 12.5 Gram(s) IV Push once  dextrose 50% Injectable 25 Gram(s) IV Push once  dextrose 50% Injectable 25 Gram(s) IV Push once  escitalopram 5 milliGRAM(s) Oral daily  folic acid 1 milliGRAM(s) Oral daily  hydrocortisone 10 milliGRAM(s) Oral every 12 hours  insulin regular  human corrective regimen sliding scale   SubCutaneous every 6 hours  insulin regular  human recombinant 13 Unit(s) SubCutaneous every 6 hours  levETIRAcetam  Solution 500 milliGRAM(s) Oral every 24 hours  metoclopramide 5 milliGRAM(s) Oral every 12 hours  midodrine 10 milliGRAM(s) Oral every 8 hours  modafinil 100 milliGRAM(s) Oral daily  multivitamin 1 Tablet(s) Oral daily  warfarin 2.5 milliGRAM(s) Oral at bedtime    MEDICATIONS  (PRN):  dextrose Gel 1 Dose(s) Oral once PRN Blood Glucose LESS THAN 70 milliGRAM(s)/deciliter  glucagon  Injectable 1 milliGRAM(s) IntraMuscular once PRN Glucose LESS THAN 70 milligrams/deciliter      Allergies    No Known Allergies    Intolerances      LABS:                        8.2    31.5  )-----------( 295      ( 07 Jun 2018 06:52 )             28.4       139  |  95<L>  |  53<H>  ----------------------------<  158<H>  5.6<H>   |  24  |  3.65<H>    Ca    9.3      07 Jun 2018 06:50  Mg     2.2     06-06      RADIOLOGY & ADDITIONAL TESTS:  Pending MR Hip and MR Pelvis. INTERVAL HPI/OVERNIGHT EVENTS:  Patient spiked a temp 102.5F overnight, blood cultures were drawn - patient noted to be diaphoretic and poorly responsive.   Again at bedside this AM - T 100.9 - admin IV Tylenol - after which patient became more responsive and alert.  Started patient on Fluconazole treatment given yeast in urine.     VITAL SIGNS:  T(F): 98 (07 Jun 2018 10:15), Max: 102.5 (06 Jun 2018 21:19)  HR: 82 (07 Jun 2018 10:15) (76 - 123)  BP: 96/68 (07 Jun 2018 10:15) (88/56 - 106/77)  RR: 29 (07 Jun 2018 10:15) (12 - 29)  SpO2: 99% (07 Jun 2018 10:15) (96% - 100%)    PHYSICAL EXAM:  Constitutional: Patient lying in bed, no acute distress. Trach in situ, Ventilator Dependent.   HEENT: PERRLA  Neck: No LAD, No JVD  Respiratory: Normal air entry, no wheeze or crackles on auscultation.   Cardiovascular: S1 and S2 present - no additional abnormal sounds or murmurs. Normal rhythm and rate of pulse.   Gastrointestinal: BS+, soft, slightly distended and tympanic to percussion.   Extremities: No peripheral edema  Vascular: 2+ peripheral pulses  Neurological: A/O x 3, CN V-XII grossly intact.  Psychiatric: Normal mood, normal affect  Musculoskeletal: 5/5 strength b/l upper and lower extremities  Skin: B/l venous stasis changes present.     MEDICATIONS  (STANDING):  acetaminophen    Suspension. 650 milliGRAM(s) Oral every 8 hours  acetylcysteine 20% Inhalation 4 milliLiter(s) Inhalation two times a day  aspirin  chewable 81 milliGRAM(s) Oral daily  atorvastatin 80 milliGRAM(s) Oral at bedtime  calcitriol  Solution 0.25 MICROGram(s) Oral daily  collagenase Ointment 1 Application(s) Topical daily  dextrose 5%. 1000 milliLiter(s) (50 mL/Hr) IV Continuous <Continuous>  dextrose 50% Injectable 12.5 Gram(s) IV Push once  dextrose 50% Injectable 25 Gram(s) IV Push once  dextrose 50% Injectable 25 Gram(s) IV Push once  escitalopram 5 milliGRAM(s) Oral daily  folic acid 1 milliGRAM(s) Oral daily  hydrocortisone 10 milliGRAM(s) Oral every 12 hours  insulin regular  human corrective regimen sliding scale   SubCutaneous every 6 hours  insulin regular  human recombinant 13 Unit(s) SubCutaneous every 6 hours  levETIRAcetam  Solution 500 milliGRAM(s) Oral every 24 hours  metoclopramide 5 milliGRAM(s) Oral every 12 hours  midodrine 10 milliGRAM(s) Oral every 8 hours  modafinil 100 milliGRAM(s) Oral daily  multivitamin 1 Tablet(s) Oral daily  warfarin 2.5 milliGRAM(s) Oral at bedtime    MEDICATIONS  (PRN):  dextrose Gel 1 Dose(s) Oral once PRN Blood Glucose LESS THAN 70 milliGRAM(s)/deciliter  glucagon  Injectable 1 milliGRAM(s) IntraMuscular once PRN Glucose LESS THAN 70 milligrams/deciliter      Allergies    No Known Allergies    Intolerances      LABS:                        8.2    31.5  )-----------( 295      ( 07 Jun 2018 06:52 )             28.4       139  |  95<L>  |  53<H>  ----------------------------<  158<H>  5.6<H>   |  24  |  3.65<H>    Ca    9.3      07 Jun 2018 06:50  Mg     2.2     06-06      RADIOLOGY & ADDITIONAL TESTS:  Pending MR Hip and MR Pelvis.

## 2018-06-07 NOTE — PROGRESS NOTE ADULT - ATTENDING COMMENTS
Sepsis 2/2 to candida cystisis and likely sacral osteomyelitis: Cont fluconazole and f/u fugal blood cx. If fungemia start micafungin. Cont Zosyn. F/u plastics recs for sacral wound.

## 2018-06-07 NOTE — PROGRESS NOTE ADULT - ATTENDING COMMENTS
Worsening fever curve and leukocytosis. The sacral wound goes down to bone (confirming diagnosis of osteomyelitis). Repeat bcx; awaiting MRI pelvis for better evaluation of sacral OM and possible underlying abscess; plastics evaluation; continue Zosyn and fluconazole (for Candida UTI); pending GOC/family meeting for long-term plan--unfortunately the osteomyelitis is unlikely to resolve even with broad-spectrum IV antibiotics in the setting of deep sacral ulcer that is continuously contaminated with feces.

## 2018-06-07 NOTE — PROGRESS NOTE ADULT - SUBJECTIVE AND OBJECTIVE BOX
Patient seen and examined at bedside. Patient is a 63 year old male with a history of HFrEF 10-15% due to ischemic cardiomyopathy, s/p AICD, ?atrial fibrillation, hypertension, diabetes mellitus type 2, gout, and CKD for whom nephrology was called for GILMER from ATN requiring hemodialysis. Patient was last dialyzed 6/5 with UF of 2.5L. Patient noted to have worsening leukocytosis thought to be from osteomyelitis. Cultures thus far negative from 6/7. ID on board.     acetaminophen    Suspension. 650 milliGRAM(s) every 8 hours  acetylcysteine 20% Inhalation 4 milliLiter(s) two times a day  aspirin  chewable 81 milliGRAM(s) daily  atorvastatin 80 milliGRAM(s) at bedtime  calcitriol  Solution 0.25 MICROGram(s) daily  collagenase Ointment 1 Application(s) daily  Dakins Solution - 1/4 Strength 1 Application(s) daily  dextrose 5%. 1000 milliLiter(s) <Continuous>  dextrose 50% Injectable 12.5 Gram(s) once  dextrose 50% Injectable 25 Gram(s) once  dextrose 50% Injectable 25 Gram(s) once  dextrose Gel 1 Dose(s) once PRN  escitalopram 5 milliGRAM(s) daily  folic acid 1 milliGRAM(s) daily  glucagon  Injectable 1 milliGRAM(s) once PRN  hydrocortisone 10 milliGRAM(s) every 12 hours  insulin regular  human corrective regimen sliding scale   <User Schedule>  insulin regular  human recombinant 13 Unit(s) every 6 hours  levETIRAcetam  Solution 500 milliGRAM(s) every 24 hours  metoclopramide 5 milliGRAM(s) every 12 hours  midodrine 10 milliGRAM(s) every 8 hours  modafinil 100 milliGRAM(s) daily  multivitamin 1 Tablet(s) daily  piperacillin/tazobactam IVPB. 2.25 Gram(s) every 8 hours  warfarin 2.5 milliGRAM(s) once    Allergies    No Known Allergies    Intolerances    T(C): , Max: 39.2 (06-06-18 @ 21:19)  T(F): , Max: 102.5 (06-06-18 @ 21:19)  HR: 70 (06-07-18 @ 13:25)  BP: 127/55 (06-07-18 @ 13:25)  RR: 20 (06-07-18 @ 13:25)  SpO2: 98% (06-07-18 @ 13:25)    LABS:                        8.2    31.5  )-----------( 295      ( 07 Jun 2018 06:52 )             28.4     06-07    139  |  95<L>  |  53<H>  ----------------------------<  158<H>  5.6<H>   |  24  |  3.65<H>    Ca    9.3      07 Jun 2018 06:50  Mg     2.2     06-06    PT/INR - ( 07 Jun 2018 06:51 )   PT: 26.9 sec;   INR: 2.38

## 2018-06-07 NOTE — PROGRESS NOTE ADULT - PROBLEM SELECTOR PLAN 1
- Patient treated for Sacral Decub growing Pseudomonas for 7 days with IV Zosyn - since stopping, persistently elevating leukocytosis  - Low grade temp rectally 100.3 F (6/5)  - Will persist with full skin exam - new purulence in ulcer - to follow up with MRI Pelvis and Hip to assess for Osteo   - Repeat Blood Cx, NGTD  - UCx: Yeast  - CXR for the most part unchanged.     #Fungemia  Noted to have Candida Tropicalis growing in blood cultures and completed fluconazole course. - Patient treated for Sacral Decub growing Pseudomonas for 7 days with IV Zosyn - since stopping, persistently elevating leukocytosis  - Low grade temp rectally 100.3 F (6/5) and 102.5 F (6/7)  - Will persist with full skin exam - new purulence in ulcer - to follow up with MRI Pelvis and Hip to assess for Osteo   - Repeat Blood Cx, NGTD  - UCx: Yeast  - C/w Fluconazole, 800mg loading dose, 200mg thereafter.     #Fungemia  Noted to have Candida Tropicalis growing in blood cultures and completed fluconazole course.

## 2018-06-07 NOTE — PROGRESS NOTE ADULT - PROBLEM SELECTOR PLAN 10
VTE: Coumadin dosed nightly, with INR daily checks, goal 2-3  GI PPx: PPI  DNR- Made DNR, family wants escalation of care, pressors if needed.  F: No IVF in setting of HD  E: Replete electrolytes with caution in setting of HD  N: Jevity 1.5 goal rate 62cc/hr, per updated nutrition recs 5/21  Dispo: SD to [facility], likely chronic vent facility + HD, pending SW placement. Dispo plans and SD plans discussed with daughter Cristal

## 2018-06-07 NOTE — PROGRESS NOTE ADULT - PROBLEM SELECTOR PLAN 5
Patient noted to have worsening leukocytosis. Patient's blood culture from early today is thus far negative at 12 hours. Source of infection thought to be due to sacral ulcer leading to osteomyelitis. ID on board.

## 2018-06-07 NOTE — PROGRESS NOTE ADULT - SUBJECTIVE AND OBJECTIVE BOX
Patient was seen and evaluated on dialysis.   Patient is tolerating the procedure well.   HR: 70 (06-07-18 @ 13:25)  BP: 127/55 (06-07-18 @ 13:25)  Continue dialysis:   Revaclear 400, , , 2K bath   Goal UF 2.5 kg over 3.5 hours.

## 2018-06-08 DIAGNOSIS — Z99.2 DEPENDENCE ON RENAL DIALYSIS: ICD-10-CM

## 2018-06-08 DIAGNOSIS — R52 PAIN, UNSPECIFIED: ICD-10-CM

## 2018-06-08 DIAGNOSIS — L89.154 PRESSURE ULCER OF SACRAL REGION, STAGE 4: ICD-10-CM

## 2018-06-08 DIAGNOSIS — R19.7 DIARRHEA, UNSPECIFIED: ICD-10-CM

## 2018-06-08 LAB
ANION GAP SERPL CALC-SCNC: 17 MMOL/L — SIGNIFICANT CHANGE UP (ref 5–17)
BUN SERPL-MCNC: 41 MG/DL — HIGH (ref 7–23)
CALCIUM SERPL-MCNC: 9.2 MG/DL — SIGNIFICANT CHANGE UP (ref 8.4–10.5)
CHLORIDE SERPL-SCNC: 97 MMOL/L — SIGNIFICANT CHANGE UP (ref 96–108)
CO2 SERPL-SCNC: 25 MMOL/L — SIGNIFICANT CHANGE UP (ref 22–31)
CREAT SERPL-MCNC: 2.67 MG/DL — HIGH (ref 0.5–1.3)
CULTURE RESULTS: SIGNIFICANT CHANGE UP
GLUCOSE BLDC GLUCOMTR-MCNC: 120 MG/DL — HIGH (ref 70–99)
GLUCOSE BLDC GLUCOMTR-MCNC: 130 MG/DL — HIGH (ref 70–99)
GLUCOSE BLDC GLUCOMTR-MCNC: 138 MG/DL — HIGH (ref 70–99)
GLUCOSE BLDC GLUCOMTR-MCNC: 170 MG/DL — HIGH (ref 70–99)
GLUCOSE BLDC GLUCOMTR-MCNC: 180 MG/DL — HIGH (ref 70–99)
GLUCOSE BLDC GLUCOMTR-MCNC: 255 MG/DL — HIGH (ref 70–99)
GLUCOSE SERPL-MCNC: 200 MG/DL — HIGH (ref 70–99)
HCT VFR BLD CALC: 26.7 % — LOW (ref 39–50)
HGB BLD-MCNC: 7.7 G/DL — LOW (ref 13–17)
INR BLD: 2.52 — HIGH (ref 0.88–1.16)
MCHC RBC-ENTMCNC: 25.4 PG — LOW (ref 27–34)
MCHC RBC-ENTMCNC: 28.8 G/DL — LOW (ref 32–36)
MCV RBC AUTO: 88.1 FL — SIGNIFICANT CHANGE UP (ref 80–100)
PLATELET # BLD AUTO: 292 K/UL — SIGNIFICANT CHANGE UP (ref 150–400)
POTASSIUM SERPL-MCNC: 4.8 MMOL/L — SIGNIFICANT CHANGE UP (ref 3.5–5.3)
POTASSIUM SERPL-SCNC: 4.8 MMOL/L — SIGNIFICANT CHANGE UP (ref 3.5–5.3)
PROTHROM AB SERPL-ACNC: 28.5 SEC — HIGH (ref 9.8–12.7)
RBC # BLD: 3.03 M/UL — LOW (ref 4.2–5.8)
RBC # FLD: 18.8 % — HIGH (ref 10.3–16.9)
SODIUM SERPL-SCNC: 139 MMOL/L — SIGNIFICANT CHANGE UP (ref 135–145)
SPECIMEN SOURCE: SIGNIFICANT CHANGE UP
WBC # BLD: 21.9 K/UL — HIGH (ref 3.8–10.5)
WBC # FLD AUTO: 21.9 K/UL — HIGH (ref 3.8–10.5)

## 2018-06-08 PROCEDURE — 99232 SBSQ HOSP IP/OBS MODERATE 35: CPT | Mod: GC

## 2018-06-08 PROCEDURE — 72195 MRI PELVIS W/O DYE: CPT | Mod: 26

## 2018-06-08 PROCEDURE — 99233 SBSQ HOSP IP/OBS HIGH 50: CPT

## 2018-06-08 PROCEDURE — 99233 SBSQ HOSP IP/OBS HIGH 50: CPT | Mod: GC

## 2018-06-08 RX ORDER — ACETAMINOPHEN 500 MG
650 TABLET ORAL ONCE
Qty: 0 | Refills: 0 | Status: COMPLETED | OUTPATIENT
Start: 2018-06-08 | End: 2018-06-08

## 2018-06-08 RX ORDER — WARFARIN SODIUM 2.5 MG/1
2.5 TABLET ORAL ONCE
Qty: 0 | Refills: 0 | Status: COMPLETED | OUTPATIENT
Start: 2018-06-08 | End: 2018-06-08

## 2018-06-08 RX ORDER — DOXERCALCIFEROL 2.5 UG/1
2 CAPSULE ORAL ONCE
Qty: 0 | Refills: 0 | Status: COMPLETED | OUTPATIENT
Start: 2018-06-09 | End: 2018-06-09

## 2018-06-08 RX ORDER — ERYTHROPOIETIN 10000 [IU]/ML
10000 INJECTION, SOLUTION INTRAVENOUS; SUBCUTANEOUS ONCE
Qty: 0 | Refills: 0 | Status: COMPLETED | OUTPATIENT
Start: 2018-06-09 | End: 2018-06-09

## 2018-06-08 RX ORDER — ACETAMINOPHEN 500 MG
1000 TABLET ORAL ONCE
Qty: 0 | Refills: 0 | Status: COMPLETED | OUTPATIENT
Start: 2018-06-08 | End: 2018-06-08

## 2018-06-08 RX ADMIN — INSULIN HUMAN 1: 100 INJECTION, SOLUTION SUBCUTANEOUS at 06:53

## 2018-06-08 RX ADMIN — ESCITALOPRAM OXALATE 5 MILLIGRAM(S): 10 TABLET, FILM COATED ORAL at 12:08

## 2018-06-08 RX ADMIN — PIPERACILLIN AND TAZOBACTAM 200 GRAM(S): 4; .5 INJECTION, POWDER, LYOPHILIZED, FOR SOLUTION INTRAVENOUS at 13:36

## 2018-06-08 RX ADMIN — Medication 650 MILLIGRAM(S): at 14:36

## 2018-06-08 RX ADMIN — MIDODRINE HYDROCHLORIDE 10 MILLIGRAM(S): 2.5 TABLET ORAL at 18:14

## 2018-06-08 RX ADMIN — Medication 650 MILLIGRAM(S): at 18:13

## 2018-06-08 RX ADMIN — Medication 1 TABLET(S): at 12:07

## 2018-06-08 RX ADMIN — MIDODRINE HYDROCHLORIDE 10 MILLIGRAM(S): 2.5 TABLET ORAL at 12:07

## 2018-06-08 RX ADMIN — INSULIN HUMAN 13 UNIT(S): 100 INJECTION, SOLUTION SUBCUTANEOUS at 06:53

## 2018-06-08 RX ADMIN — Medication 10 MILLIGRAM(S): at 10:53

## 2018-06-08 RX ADMIN — Medication 650 MILLIGRAM(S): at 23:21

## 2018-06-08 RX ADMIN — INSULIN HUMAN 1: 100 INJECTION, SOLUTION SUBCUTANEOUS at 12:17

## 2018-06-08 RX ADMIN — Medication 4 MILLILITER(S): at 06:59

## 2018-06-08 RX ADMIN — Medication 1 APPLICATION(S): at 12:17

## 2018-06-08 RX ADMIN — ATORVASTATIN CALCIUM 80 MILLIGRAM(S): 80 TABLET, FILM COATED ORAL at 22:21

## 2018-06-08 RX ADMIN — WARFARIN SODIUM 2.5 MILLIGRAM(S): 2.5 TABLET ORAL at 22:21

## 2018-06-08 RX ADMIN — MIDODRINE HYDROCHLORIDE 10 MILLIGRAM(S): 2.5 TABLET ORAL at 06:59

## 2018-06-08 RX ADMIN — INSULIN HUMAN 13 UNIT(S): 100 INJECTION, SOLUTION SUBCUTANEOUS at 19:10

## 2018-06-08 RX ADMIN — INSULIN HUMAN 13 UNIT(S): 100 INJECTION, SOLUTION SUBCUTANEOUS at 12:16

## 2018-06-08 RX ADMIN — Medication 1 APPLICATION(S): at 12:18

## 2018-06-08 RX ADMIN — MODAFINIL 100 MILLIGRAM(S): 200 TABLET ORAL at 12:07

## 2018-06-08 RX ADMIN — Medication 10 MILLIGRAM(S): at 22:21

## 2018-06-08 RX ADMIN — Medication 650 MILLIGRAM(S): at 07:00

## 2018-06-08 RX ADMIN — PIPERACILLIN AND TAZOBACTAM 200 GRAM(S): 4; .5 INJECTION, POWDER, LYOPHILIZED, FOR SOLUTION INTRAVENOUS at 06:53

## 2018-06-08 RX ADMIN — Medication 650 MILLIGRAM(S): at 22:21

## 2018-06-08 RX ADMIN — Medication 5 MILLIGRAM(S): at 06:58

## 2018-06-08 RX ADMIN — INSULIN HUMAN 13 UNIT(S): 100 INJECTION, SOLUTION SUBCUTANEOUS at 00:46

## 2018-06-08 RX ADMIN — Medication 5 MILLIGRAM(S): at 18:15

## 2018-06-08 RX ADMIN — PIPERACILLIN AND TAZOBACTAM 200 GRAM(S): 4; .5 INJECTION, POWDER, LYOPHILIZED, FOR SOLUTION INTRAVENOUS at 22:20

## 2018-06-08 RX ADMIN — FLUCONAZOLE 100 MILLIGRAM(S): 150 TABLET ORAL at 14:55

## 2018-06-08 RX ADMIN — CALCITRIOL 0.25 MICROGRAM(S): 0.5 CAPSULE ORAL at 12:16

## 2018-06-08 RX ADMIN — LEVETIRACETAM 500 MILLIGRAM(S): 250 TABLET, FILM COATED ORAL at 14:06

## 2018-06-08 RX ADMIN — Medication 81 MILLIGRAM(S): at 12:08

## 2018-06-08 RX ADMIN — Medication 650 MILLIGRAM(S): at 18:43

## 2018-06-08 RX ADMIN — Medication 400 MILLIGRAM(S): at 10:06

## 2018-06-08 RX ADMIN — Medication 4 MILLILITER(S): at 18:13

## 2018-06-08 RX ADMIN — Medication 650 MILLIGRAM(S): at 14:06

## 2018-06-08 RX ADMIN — Medication 1 MILLIGRAM(S): at 12:07

## 2018-06-08 RX ADMIN — Medication 650 MILLIGRAM(S): at 07:55

## 2018-06-08 NOTE — PROGRESS NOTE ADULT - PROBLEM SELECTOR PLAN 8
Likely 2/2 CKD, No bloody output. Prior studies consistent with Anemia of chronic disease. Hgb stable.  - Keep active T&S, Monitor H/H Support provided to patient and family. Patient to have access to supportive services during rest of hospital stay as the patient/family deemed necessary ie. Massage therapy, Music therapy, Patient and family supportive services, etc.

## 2018-06-08 NOTE — STUDENT SIGN OFF DOCUMENT - DOCUMENTS STUDENTS ARE SIGNED OFF ON
Plan of Care/Assessment and Intervention
Vital Signs/Plan of Care/Assessment and Intervention
Plan of Care

## 2018-06-08 NOTE — PROGRESS NOTE ADULT - PROBLEM SELECTOR PLAN 4
Treated for both septic as well as cardiogenic shock requiring pressors and inotrope. Subsequently weaned off, now on midodrine 15 mg TID. Has intermittently used albumin to tolerate dialysis. BP 80/60s.   - c/w Midodrine 10 mg q8h + add 10 midodrine pre-HD  - hydrocortisone 10mg BID (tapered to this dose on 5/29)  - tolerated HD w/o albumin, f/u renal recs    # r/o adrenal Insufficiency  BP have been low with SBP in , with MAP> 60.  - continue to taper stress dose steroids multiple potential sites of pain (tubes, drain, wounds, artifical devices, prolonged immobility)  Pt had previously had hydromorphone 0.25mg iv q4h prn available. would consider same now.  Pt denying the presence of pain now, but pt remains at high risk for pain at any time due to extremely frail and fragile condition.

## 2018-06-08 NOTE — PROGRESS NOTE ADULT - ATTENDING COMMENTS
s/p bedside debridement of sacral wound by plastics today; no abscess pockets noted--not a surgical candidate given poor functional status. Awaiting MRI pelvis. Continue Zosyn for presumed sacral osteomyelitis. Continue fluconazole for Candida UTI. Awaiting family meeting to further elucidate GOC.

## 2018-06-08 NOTE — PROGRESS NOTE ADULT - PROBLEM SELECTOR PLAN 9
CHF with EF 10-15% 2/2 ischemic cardiomyopathy s/p AICD. Elevated BNP on admission with R sided effusion and edema. Patient with persistent pleural effusions on CXR and US and b/l dependent edema. Assisted now with HD.  -HOLDING ACE/BB in setting of sepsis/ hypotension  - c/w midodrine  - anuric, therefore no Lasix/diuresis  - c/w HD for fluid balance    #CAD- Hx of MI and ischemic CM s/p AICD, STEMI 7/2017, was started on Aspirin and Brilinta.   - c/w aspirin 81 and Atorvastatin 80mg qhs pts AICD has not be deactivated

## 2018-06-08 NOTE — PROGRESS NOTE ADULT - PROBLEM SELECTOR PLAN 6
Failure likely 2/2 to ischemic ATN with hypoperfusion in setting of shock. Baseline unknown but presenting Cr 3.93 with associated metabolic derangements. Started on HD during course with renal following. Permacath replaced multiple times over course for bacteremia and fungemia. Has R sided Permacath.   - HD per nephro next on 5/29    Patient to be considered for AV fistula pending medical optimization for surgical procedure. Patient is currently not medically optimized and requires continued rehabilitation prior to surgical consideration. Failure likely 2/2 to ischemic ATN with hypoperfusion in setting of shock. Baseline unknown but presenting Cr 3.93 with associated metabolic derangements. Started on HD during course with renal following. Permacath replaced multiple times over course for bacteremia and fungemia. Has R sided Permacath.       Patient to be considered for AV fistula pending medical optimization for surgical procedure. Patient is currently not medically optimized and requires continued rehabilitation prior to surgical consideration.

## 2018-06-08 NOTE — PROGRESS NOTE ADULT - SUBJECTIVE AND OBJECTIVE BOX
INTERVAL HPI/OVERNIGHT EVENTS:      VITAL SIGNS:  T(F): 100 (08 Jun 2018 09:26), Max: 100.3 (08 Jun 2018 02:47)  HR: 85 (08 Jun 2018 09:10) (70 - 105)  BP: 93/66 (08 Jun 2018 08:40) (63/51 - 127/55)  RR: 19 (08 Jun 2018 09:10) (10 - 24)  SpO2: 100% (08 Jun 2018 09:10) (98% - 100%)    PHYSICAL EXAM:  Constitutional: Patient lying in bed, no acute distress. Trach in situ, Ventilator Dependent.   HEENT: PERRLA  Neck: No LAD, No JVD  Respiratory: Normal air entry, no wheeze or crackles on auscultation.   Cardiovascular: S1 and S2 present - no additional abnormal sounds or murmurs. Normal rhythm and rate of pulse.   Gastrointestinal: BS+, soft, slightly distended and tympanic to percussion.   Extremities: No peripheral edema  Vascular: 2+ peripheral pulses  Neurological: A/O x 3, CN V-XII grossly intact.  Psychiatric: Normal mood, normal affect  Musculoskeletal: 5/5 strength b/l upper and lower extremities  Skin: B/l venous stasis changes present.     MEDICATIONS  (STANDING):  acetaminophen    Suspension. 650 milliGRAM(s) Oral every 8 hours  acetylcysteine 20% Inhalation 4 milliLiter(s) Inhalation two times a day  aspirin  chewable 81 milliGRAM(s) Oral daily  atorvastatin 80 milliGRAM(s) Oral at bedtime  calcitriol  Solution 0.25 MICROGram(s) Oral daily  collagenase Ointment 1 Application(s) Topical daily  dextrose 5%. 1000 milliLiter(s) (50 mL/Hr) IV Continuous <Continuous>  dextrose 50% Injectable 12.5 Gram(s) IV Push once  dextrose 50% Injectable 25 Gram(s) IV Push once  dextrose 50% Injectable 25 Gram(s) IV Push once  escitalopram 5 milliGRAM(s) Oral daily  folic acid 1 milliGRAM(s) Oral daily  hydrocortisone 10 milliGRAM(s) Oral every 12 hours  insulin regular  human corrective regimen sliding scale   SubCutaneous every 6 hours  insulin regular  human recombinant 13 Unit(s) SubCutaneous every 6 hours  levETIRAcetam  Solution 500 milliGRAM(s) Oral every 24 hours  metoclopramide 5 milliGRAM(s) Oral every 12 hours  midodrine 10 milliGRAM(s) Oral every 8 hours  modafinil 100 milliGRAM(s) Oral daily  multivitamin 1 Tablet(s) Oral daily  warfarin 2.5 milliGRAM(s) Oral at bedtime    MEDICATIONS  (PRN):  dextrose Gel 1 Dose(s) Oral once PRN Blood Glucose LESS THAN 70 milliGRAM(s)/deciliter  glucagon  Injectable 1 milliGRAM(s) IntraMuscular once PRN Glucose LESS THAN 70 milligrams/deciliter      Allergies    No Known Allergies    Intolerances      LABS:                        7.7    21.9  )-----------( 292      ( 08 Jun 2018 07:08 )             26.7   06-08    139  |  97  |  41<H>  ----------------------------<  200<H>  4.8   |  25  |  2.67<H>    Ca    9.2      08 Jun 2018 07:08  Mg     2.1     06-07      RADIOLOGY & ADDITIONAL TESTS:  Pending MR Hip and MR Pelvis. INTERVAL HPI/OVERNIGHT EVENTS:  Last night, Patient's oral temp this AM was 100F - likely more elevated if done IA. Patient given IV Tylenol.     VITAL SIGNS:  T(F): 100 (08 Jun 2018 09:26), Max: 100.3 (08 Jun 2018 02:47)  HR: 85 (08 Jun 2018 09:10) (70 - 105)  BP: 93/66 (08 Jun 2018 08:40) (63/51 - 127/55)  RR: 19 (08 Jun 2018 09:10) (10 - 24)  SpO2: 100% (08 Jun 2018 09:10) (98% - 100%)    PHYSICAL EXAM:  Constitutional: Patient lying in bed, no acute distress. Trach in situ, Ventilator Dependent.   HEENT: PERRLA  Neck: No LAD, No JVD  Respiratory: Normal air entry, no wheeze or crackles on auscultation.   Cardiovascular: S1 and S2 present - no additional abnormal sounds or murmurs. Normal rhythm and rate of pulse.   Gastrointestinal: BS+, soft, slightly distended and tympanic to percussion.   Extremities: No peripheral edema  Vascular: 2+ peripheral pulses  Neurological: A/O x 3, CN V-XII grossly intact.  Psychiatric: Normal mood, normal affect  Musculoskeletal: 5/5 strength b/l upper and lower extremities  Skin: B/l venous stasis changes present.     MEDICATIONS  (STANDING):  acetaminophen    Suspension. 650 milliGRAM(s) Oral every 8 hours  acetylcysteine 20% Inhalation 4 milliLiter(s) Inhalation two times a day  aspirin  chewable 81 milliGRAM(s) Oral daily  atorvastatin 80 milliGRAM(s) Oral at bedtime  calcitriol  Solution 0.25 MICROGram(s) Oral daily  collagenase Ointment 1 Application(s) Topical daily  dextrose 5%. 1000 milliLiter(s) (50 mL/Hr) IV Continuous <Continuous>  dextrose 50% Injectable 12.5 Gram(s) IV Push once  dextrose 50% Injectable 25 Gram(s) IV Push once  dextrose 50% Injectable 25 Gram(s) IV Push once  escitalopram 5 milliGRAM(s) Oral daily  folic acid 1 milliGRAM(s) Oral daily  hydrocortisone 10 milliGRAM(s) Oral every 12 hours  insulin regular  human corrective regimen sliding scale   SubCutaneous every 6 hours  insulin regular  human recombinant 13 Unit(s) SubCutaneous every 6 hours  levETIRAcetam  Solution 500 milliGRAM(s) Oral every 24 hours  metoclopramide 5 milliGRAM(s) Oral every 12 hours  midodrine 10 milliGRAM(s) Oral every 8 hours  modafinil 100 milliGRAM(s) Oral daily  multivitamin 1 Tablet(s) Oral daily  warfarin 2.5 milliGRAM(s) Oral at bedtime    MEDICATIONS  (PRN):  dextrose Gel 1 Dose(s) Oral once PRN Blood Glucose LESS THAN 70 milliGRAM(s)/deciliter  glucagon  Injectable 1 milliGRAM(s) IntraMuscular once PRN Glucose LESS THAN 70 milligrams/deciliter      Allergies    No Known Allergies    Intolerances      LABS:                        7.7    21.9  )-----------( 292      ( 08 Jun 2018 07:08 )             26.7   06-08    139  |  97  |  41<H>  ----------------------------<  200<H>  4.8   |  25  |  2.67<H>    Ca    9.2      08 Jun 2018 07:08  Mg     2.1     06-07      RADIOLOGY & ADDITIONAL TESTS:  Pending MR Hip and MR Pelvis. INTERVAL HPI/OVERNIGHT EVENTS:  Last night, Plastics evaluated patient's sacral ulcer - the area was debrided and wound care instructions were given. Per them, ulcer unlikely to cause overwhelming sepsis, more likely the urine. Patient's oral temp this AM was 100F - likely more elevated if done ND. Patient given IV Tylenol. Patient to get MRI done today - cleared by EP, nothing to do with regards to AICD. Meeting set for 2pm Monday to discuss care with a family meeting on Friday.     VITAL SIGNS:  T(F): 100 (08 Jun 2018 09:26), Max: 100.3 (08 Jun 2018 02:47)  HR: 85 (08 Jun 2018 09:10) (70 - 105)  BP: 93/66 (08 Jun 2018 08:40) (63/51 - 127/55)  RR: 19 (08 Jun 2018 09:10) (10 - 24)  SpO2: 100% (08 Jun 2018 09:10) (98% - 100%)    PHYSICAL EXAM:  Constitutional: Patient lying in bed, no acute distress. Trach in situ, Ventilator Dependent.   HEENT: PERRLA  Neck: No LAD, No JVD  Respiratory: Normal air entry, no wheeze or crackles on auscultation.   Cardiovascular: S1 and S2 present - no additional abnormal sounds or murmurs. Normal rhythm and rate of pulse.   Gastrointestinal: BS+, soft, slightly distended and tympanic to percussion.   Extremities: No peripheral edema  Vascular: 2+ peripheral pulses  Neurological: A/O x 3, CN V-XII grossly intact.  Psychiatric: Normal mood, normal affect  Musculoskeletal: 5/5 strength b/l upper and lower extremities  Skin: B/l venous stasis changes present.     MEDICATIONS  (STANDING):  acetaminophen    Suspension. 650 milliGRAM(s) Oral every 8 hours  acetylcysteine 20% Inhalation 4 milliLiter(s) Inhalation two times a day  aspirin  chewable 81 milliGRAM(s) Oral daily  atorvastatin 80 milliGRAM(s) Oral at bedtime  calcitriol  Solution 0.25 MICROGram(s) Oral daily  collagenase Ointment 1 Application(s) Topical daily  dextrose 5%. 1000 milliLiter(s) (50 mL/Hr) IV Continuous <Continuous>  dextrose 50% Injectable 12.5 Gram(s) IV Push once  dextrose 50% Injectable 25 Gram(s) IV Push once  dextrose 50% Injectable 25 Gram(s) IV Push once  escitalopram 5 milliGRAM(s) Oral daily  folic acid 1 milliGRAM(s) Oral daily  hydrocortisone 10 milliGRAM(s) Oral every 12 hours  insulin regular  human corrective regimen sliding scale   SubCutaneous every 6 hours  insulin regular  human recombinant 13 Unit(s) SubCutaneous every 6 hours  levETIRAcetam  Solution 500 milliGRAM(s) Oral every 24 hours  metoclopramide 5 milliGRAM(s) Oral every 12 hours  midodrine 10 milliGRAM(s) Oral every 8 hours  modafinil 100 milliGRAM(s) Oral daily  multivitamin 1 Tablet(s) Oral daily  warfarin 2.5 milliGRAM(s) Oral at bedtime    MEDICATIONS  (PRN):  dextrose Gel 1 Dose(s) Oral once PRN Blood Glucose LESS THAN 70 milliGRAM(s)/deciliter  glucagon  Injectable 1 milliGRAM(s) IntraMuscular once PRN Glucose LESS THAN 70 milligrams/deciliter      Allergies    No Known Allergies    Intolerances      LABS:                        7.7    21.9  )-----------( 292      ( 08 Jun 2018 07:08 )             26.7   06-08    139  |  97  |  41<H>  ----------------------------<  200<H>  4.8   |  25  |  2.67<H>    Ca    9.2      08 Jun 2018 07:08  Mg     2.1     06-07      RADIOLOGY & ADDITIONAL TESTS:  Pending MR Hip and MR Pelvis.

## 2018-06-08 NOTE — PROGRESS NOTE ADULT - PROBLEM SELECTOR PLAN 2
Acute respiratory failure in setting of septic and cardiogenic shock, with trach placed on 4/5/18 for persistent, now chronic respiratory failure. CXR showing effusions. On daily HD.   - Continues to tolerate HD w/o albumin (cannot go to LTAC if req. albumin)  - Intermittent tachypnea likely a component of pain, Dilaudid 0.25mg IV PRN  - ENT to replace trach before patient is accepted to [facility].  Pt followed by plastics for possible intervention.  Pt unable to have a MRI due to his pacer to determine if pt has OM.

## 2018-06-08 NOTE — PROGRESS NOTE ADULT - ATTENDING COMMENTS
Pt. seen and examined by me at 12PM; I have read Dr. Jaffe's note, I agree w/ her findings and plan of care as documented; case d/w Palliative Care NP and Ethics, plan for family meeting on Monday

## 2018-06-08 NOTE — PROGRESS NOTE ADULT - PROBLEM SELECTOR PLAN 5
Patient noted to have worsening leukocytosis. Patient's blood culture from early today is thus far negative at 12 hours. Source of infection thought to be due to sacral ulcer leading to osteomyelitis. ID on board. Will attend goals of care meeting.

## 2018-06-08 NOTE — CONSULT NOTE ADULT - SUBJECTIVE AND OBJECTIVE BOX
Plastic Surgery Consult Note    CC:Patient is a 63y old  Male who presents with a chief complaint of Septic Shock due to bilateral cellulitis - course complicated by renal failure (on hemodialysis), stroke, and respiratory failure. (01 Jun 2018 14:51)      HPI  HPI:  62 yo M history of HFrEF 10-15% 2/2 ischemic cardiomyopathy, MI , s/p AICD vs PPM, ?Afib, Hypertension, Diabetes Mellitus Type 2 on insulin, CKD?and gout, who presents with a chief complaint of generalized weakness. Pt wad admitted to [facility] 2 months ago for gout and was sent to rehab. He was discharged home mid Feb with VNS. In the past 2 weeks patient has noted increased leg pain and weakness bilaterally. In the last few days, he has also experienced generalized weakness prompting him to come to ER.   In ER, noted to have t max of 102, SBP 70s, leukocytosis to 32.8, Lactate of 6.6, Acute renal failure with severe metabolic derangements. Given 3.5L fluids and Vanc/Zosyn. MICU consulted. (10 Mar 2018 18:51)    Pt is currently trached and sedated so all info per chart review. Consulted for long standing sacral wound.    PAST MEDICAL & SURGICAL HISTORY:  Type 2 diabetes mellitus with diabetic peripheral angiopathy without gangrene, with long-term curren  Essential hypertension, benign  Gout  Pacemaker  Chronic systolic heart failure  Myocardial infarction  No significant past surgical history    Allergies    No Known Allergies    Intolerances      MEDICATIONS  (STANDING):  acetaminophen    Suspension. 650 milliGRAM(s) Oral every 8 hours  acetylcysteine 20% Inhalation 4 milliLiter(s) Inhalation two times a day  aspirin  chewable 81 milliGRAM(s) Oral daily  atorvastatin 80 milliGRAM(s) Oral at bedtime  calcitriol  Solution 0.25 MICROGram(s) Oral daily  collagenase Ointment 1 Application(s) Topical daily  Dakins Solution - 1/4 Strength 1 Application(s) Topical daily  dextrose 5%. 1000 milliLiter(s) (50 mL/Hr) IV Continuous <Continuous>  dextrose 50% Injectable 12.5 Gram(s) IV Push once  dextrose 50% Injectable 25 Gram(s) IV Push once  dextrose 50% Injectable 25 Gram(s) IV Push once  escitalopram 5 milliGRAM(s) Oral daily  fluconAZOLE IVPB 200 milliGRAM(s) IV Intermittent every 24 hours  folic acid 1 milliGRAM(s) Oral daily  hydrocortisone 10 milliGRAM(s) Oral every 12 hours  insulin regular  human corrective regimen sliding scale   SubCutaneous <User Schedule>  insulin regular  human recombinant 13 Unit(s) SubCutaneous every 6 hours  levETIRAcetam  Solution 500 milliGRAM(s) Oral every 24 hours  metoclopramide 5 milliGRAM(s) Oral every 12 hours  midodrine 10 milliGRAM(s) Oral every 8 hours  modafinil 100 milliGRAM(s) Oral daily  multivitamin 1 Tablet(s) Oral daily  piperacillin/tazobactam IVPB. 2.25 Gram(s) IV Intermittent every 8 hours    MEDICATIONS  (PRN):  dextrose Gel 1 Dose(s) Oral once PRN Blood Glucose LESS THAN 70 milliGRAM(s)/deciliter  glucagon  Injectable 1 milliGRAM(s) IntraMuscular once PRN Glucose LESS THAN 70 milligrams/deciliter    Prescriptions:    FAMILY HISTORY: Noncontributory    ROS: 12 point review of systems was obtained and negative except as noted per HPI    Physical Exam:  Vital Signs Last 24 Hrs  T(C): 37.8 (08 Jun 2018 09:26), Max: 37.9 (08 Jun 2018 02:47)  T(F): 100 (08 Jun 2018 09:26), Max: 100.3 (08 Jun 2018 02:47)  HR: 85 (08 Jun 2018 09:10) (70 - 105)  BP: 93/66 (08 Jun 2018 08:40) (63/51 - 127/55)  BP(mean): --  RR: 19 (08 Jun 2018 09:10) (10 - 24)  SpO2: 100% (08 Jun 2018 09:10) (98% - 100%)    Trached and sedated  [id number] sacral wound stage 4 down to sacral bone  No purulence or evidence of infection or tunnelling                            7.7    21.9  )-----------( 292      ( 08 Jun 2018 07:08 )             26.7     06-08    139  |  97  |  41<H>  ----------------------------<  200<H>  4.8   |  25  |  2.67<H>    Ca    9.2      08 Jun 2018 07:08  Mg     2.1     06-07      Imaging:     A/P: 63yMale w/sacral wound 2/2 severely debilitated status    Procedure: Debridement through all levels of soft tissue over entire wound [id number] using scissors. Wound does not tunnel. Removal of skin and soft tissue and fascia. No abscess pockets.    Plan: Continue frequent turning q2h for pressure offloading  Air fluid mattress  1/4% Dakins for 2 days to clean wound then switch to dry dressings with Allevyn over top  Soilage control. Consider rectal tube but risk of long term use and rectum perforation  Nutrition support  Patient is not a surgical candidate based on his comorbidities and inability to have a functional improvement and need for 6 weeks of prone positioning postop  Monitor for osteomyelitis. Likely long term abx needed for this issue  Abx per primary team. Not needed from my perspective for wound  Call with questions Dr Marck Galdamez, [facility] [phone]

## 2018-06-08 NOTE — PROGRESS NOTE ADULT - SUBJECTIVE AND OBJECTIVE BOX
Patient seen and examined at bedside. Patient is a 63 year old male with a history of HFrEF 10-15% due to ischemic cardiomyopathy, s/p AICD, ?atrial fibrillation, hypertension, diabetes mellitus type 2, gout, and CKD for whom nephrology was called for GILMER from ATN requiring hemodialysis. Patient was last dialyzed 6/5 with UF of 2.5L. Patient appears comfortable and clinically unchanged. Patient was last dialyzed 6/7 with UF of 1120.     acetaminophen    Suspension. 650 milliGRAM(s) every 8 hours  acetylcysteine 20% Inhalation 4 milliLiter(s) two times a day  aspirin  chewable 81 milliGRAM(s) daily  atorvastatin 80 milliGRAM(s) at bedtime  calcitriol  Solution 0.25 MICROGram(s) daily  collagenase Ointment 1 Application(s) daily  Dakins Solution - 1/4 Strength 1 Application(s) daily  dextrose 5%. 1000 milliLiter(s) <Continuous>  dextrose 50% Injectable 12.5 Gram(s) once  dextrose 50% Injectable 25 Gram(s) once  dextrose 50% Injectable 25 Gram(s) once  dextrose Gel 1 Dose(s) once PRN  escitalopram 5 milliGRAM(s) daily  fluconAZOLE IVPB 200 milliGRAM(s) every 24 hours  folic acid 1 milliGRAM(s) daily  glucagon  Injectable 1 milliGRAM(s) once PRN  hydrocortisone 10 milliGRAM(s) every 12 hours  insulin regular  human corrective regimen sliding scale   <User Schedule>  insulin regular  human recombinant 13 Unit(s) every 6 hours  levETIRAcetam  Solution 500 milliGRAM(s) every 24 hours  metoclopramide 5 milliGRAM(s) every 12 hours  midodrine 10 milliGRAM(s) every 8 hours  modafinil 100 milliGRAM(s) daily  multivitamin 1 Tablet(s) daily  piperacillin/tazobactam IVPB. 2.25 Gram(s) every 8 hours    Allergies    No Known Allergies    Intolerances    T(C): , Max: 37.9 (06-08-18 @ 02:47)  T(F): , Max: 100.3 (06-08-18 @ 02:47)  HR: 99 (06-08-18 @ 12:36)  BP: 103/80 (06-08-18 @ 12:01)  RR: 26 (06-08-18 @ 12:36)  SpO2: 100% (06-08-18 @ 12:36)    06-07 @ 07:01  -  06-08 @ 07:00  --------------------------------------------------------  IN:    Enteral Tube Flush: 300 mL    Jevity: 620 mL    Solution: 200 mL  Total IN: 1120 mL    OUT:  Total OUT: 0 mL    Total NET: 1120 mL    LABS:                        7.7    21.9  )-----------( 292      ( 08 Jun 2018 07:08 )             26.7     06-08    139  |  97  |  41<H>  ----------------------------<  200<H>  4.8   |  25  |  2.67<H>    Ca    9.2      08 Jun 2018 07:08  Mg     2.1     06-07    PT/INR - ( 08 Jun 2018 07:08 )   PT: 28.5 sec;   INR: 2.52

## 2018-06-08 NOTE — PROGRESS NOTE ADULT - PROBLEM SELECTOR PLAN 1
Patient is a 63 year old male with acute renal failure from ischemic ATN now requiring hemodialysis. Patient was last dialyzed on 6/7 with UF of 1.1L     P - Patient does not require emergent dialysis today as volume status is acceptable  Anticipate next dialysis on 6/9

## 2018-06-08 NOTE — PROGRESS NOTE ADULT - SUBJECTIVE AND OBJECTIVE BOX
Palliative Medicine asked to reconsult on this 63 year old male who has been hospitalized over 3 months.  Pt was admitted with cellulitis in 2018.  His course was complicated by septic shock, cardiogenic shock, renal failure, respiratory failure, multiple infections and cva with seizure activity.  Pt remains mechanically vented, hemodialysis dependant, completely disabled requiring complete care for ADLs.  He is fed enterally through a PEG tube. Pt has developed significant decubitus ulcer which is concerning for osteomyelitis (work up in progress). As pt is fecally incontinent, he continues to contaminate the wound with fecal matter.    Unclear at this point, what is achievable in regard to control of infection from this ulcer as well as steps involved to remediate it and the ongoing use of antibiotics to treat same.         	                   PAIN (0-10):  DYSPNEA (Y/N):  NAUSEA/VOMITING (Y/N):  SECRETIONS (Y/N):  AGITATION (Y/N):    OTHER REVIEW OF SYSTEMS:  UNABLE TO OBTAIN  due to:  altered mental status  	                 T(C): 37.8 (18 @ 09:26), Max: 37.9 (18 @ 02:47)  T(F): 100 (18 @ 09:26), Max: 100.3 (18 @ 02:47)  HR: 85 (18 @ 09:10) (70 - 105)  BP: 93/66 (18 @ 08:40) (63/51 - 127/55)  RR: 19 (18 @ 09:10) (10 - 24)  SpO2: 100% (18 @ 09:10) (98% - 100%)  Wt(kg): --    Daily     Daily Weight in k (2018 05:06)    GENERAL:  lethargic, not distressed, sleepy  HEENT:  NC/AT, normocephalic, PEERLA, sclera anicteric, dry oral mucosa   NECK:         supple, no JVD, + tracheostomy  CVS:       RESP:  vented      	  GI:             + PEG tube	  :         aneuric, on HD  MUSC:     Weakness	  Edema	   NEURO:       PSYCH:           SKIN:           LYMPH:        	                   OPIATE NAÏVE (Y/N):  yes  ALLERGIES: No Known Allergies    MEDICATIONS  (STANDING):  acetaminophen    Suspension. 650 milliGRAM(s) Oral every 8 hours  acetylcysteine 20% Inhalation 4 milliLiter(s) Inhalation two times a day  aspirin  chewable 81 milliGRAM(s) Oral daily  atorvastatin 80 milliGRAM(s) Oral at bedtime  calcitriol  Solution 0.25 MICROGram(s) Oral daily  collagenase Ointment 1 Application(s) Topical daily  Dakins Solution - 1/4 Strength 1 Application(s) Topical daily  dextrose 5%. 1000 milliLiter(s) (50 mL/Hr) IV Continuous <Continuous>  dextrose 50% Injectable 12.5 Gram(s) IV Push once  dextrose 50% Injectable 25 Gram(s) IV Push once  dextrose 50% Injectable 25 Gram(s) IV Push once  escitalopram 5 milliGRAM(s) Oral daily  fluconAZOLE IVPB 200 milliGRAM(s) IV Intermittent every 24 hours  folic acid 1 milliGRAM(s) Oral daily  hydrocortisone 10 milliGRAM(s) Oral every 12 hours  insulin regular  human corrective regimen sliding scale   SubCutaneous <User Schedule>  insulin regular  human recombinant 13 Unit(s) SubCutaneous every 6 hours  levETIRAcetam  Solution 500 milliGRAM(s) Oral every 24 hours  metoclopramide 5 milliGRAM(s) Oral every 12 hours  midodrine 10 milliGRAM(s) Oral every 8 hours  modafinil 100 milliGRAM(s) Oral daily  multivitamin 1 Tablet(s) Oral daily  piperacillin/tazobactam IVPB. 2.25 Gram(s) IV Intermittent every 8 hours    MEDICATIONS  (PRN):  dextrose Gel 1 Dose(s) Oral once PRN Blood Glucose LESS THAN 70 milliGRAM(s)/deciliter  glucagon  Injectable 1 milliGRAM(s) IntraMuscular once PRN Glucose LESS THAN 70 milligrams/deciliter    	                   LABS REVIEWED                          7.7    21.9  )-----------( 292      ( 2018 07:08 )             26.7         06-08    139  |  97  |  41<H>  ----------------------------<  200<H>  4.8   |  25  |  2.67<H>    Ca    9.2      2018 07:08  Mg     2.1     06-07                           IMAGING REVIEWED:      	  ADVANCED DIRECTIVES:    DNR     PSYCHOSOCIAL-SPIRITUAL ASSESSMENT:              Care plan unchanged          GOALS OF CARE DISCUSSION:       Palliative care info/counseling provided	           Family meeting       Advanced Directives addressed	           See previous Palliative Medicine Note  	        REFERRALS:        Palliative Med        Unit SW/Case Mgmt              Speech/Swallow       Patient/Family Support       Massage Therapy       Music Therapy       Hospice       Nutrition       Ethics       PT/OT Palliative Medicine asked to reconsult on this 63 year old male who has been hospitalized over 3 months.  Pt was admitted with cellulitis in 2018.  His course was complicated by septic shock, cardiogenic shock, renal failure, respiratory failure, multiple infections and cva with seizure activity.  Pt remains mechanically vented, hemodialysis dependant, completely disabled requiring complete care for ADLs.  He is fed enterally through a PEG tube. Pt has developed significant decubitus ulcer which is concerning for osteomyelitis (work up in progress). As pt is fecally incontinent, he continues to contaminate the wound with fecal matter.    Unclear at this point, what is achievable in regard to control of infection from this ulcer as well as steps involved to remediate it and the ongoing use of antibiotics to treat same.  Additionally, pt remains profoundly debilitated with impaired quality of life.    Interval history:  Pt remains trached to vent, being fed enterally with tube feeds.  Pt has ongoing diarrhea.  Pt is arousable when I enter the room.  He nods head yes when I ask him if he is trying to scratch his face.  Pt nods head vigorously to the presence of pain.  Pt appears uncomfortable as he is trying to move around in bed.  His hands are up in the air, He has bilateral hand splints in place.     	                   PAIN (0-10):  0/10  DYSPNEA (Y/N):  no  NAUSEA/VOMITING (Y/N):  no  SECRETIONS (Y/N):  no  AGITATION (Y/N): yes mildly    OTHER REVIEW OF SYSTEMS:  UNABLE TO OBTAIN  due to:  altered mental status  	                 T(C): 37.8 (18 @ 09:26), Max: 37.9 (18 @ 02:47)  T(F): 100 (18 @ 09:26), Max: 100.3 (18 @ 02:47)  HR: 85 (18 @ 09:10) (70 - 105)  BP: 93/66 (18 @ 08:40) (63/51 - 127/55)  RR: 19 (18 @ 09:10) (10 - 24)  SpO2: 100% (18 @ 09:10) (98% - 100%)  Wt(kg): --    Daily     Daily Weight in k (2018 05:06)    GENERAL:  arousable appears uncomfortable, not relaxed  HEENT:  NC/AT, normocephalic, sclera anicteric, temporal wasting  NECK:    supple, no JVD, + tracheostomy  CVS:     reg, + left chest wall PPM  RESP:  vented, course, sonorous breath sounds  GI:    + PEG tube, flat, nontender, +BS  :    aneuric, on HD  Rectal: sheets soiled with watery dark brown BM  MUSC:     no movement to Bilat LE, moving bilat UE against gravity  NEURO:     arousable, will answer questions, will open eyes to command, not following commands with extremities  PSYCH:    maritza       SKIN:         + sacral decub (not directly visualized by me)  LYMPH:      neg  	                   OPIATE NAÏVE (Y/N):  yes  ALLERGIES: No Known Allergies    MEDICATIONS  (STANDING):  acetaminophen    Suspension. 650 milliGRAM(s) Oral every 8 hours  acetylcysteine 20% Inhalation 4 milliLiter(s) Inhalation two times a day  aspirin  chewable 81 milliGRAM(s) Oral daily  atorvastatin 80 milliGRAM(s) Oral at bedtime  calcitriol  Solution 0.25 MICROGram(s) Oral daily  collagenase Ointment 1 Application(s) Topical daily  Dakins Solution - 1/4 Strength 1 Application(s) Topical daily  dextrose 5%. 1000 milliLiter(s) (50 mL/Hr) IV Continuous <Continuous>  dextrose 50% Injectable 12.5 Gram(s) IV Push once  dextrose 50% Injectable 25 Gram(s) IV Push once  dextrose 50% Injectable 25 Gram(s) IV Push once  escitalopram 5 milliGRAM(s) Oral daily  fluconAZOLE IVPB 200 milliGRAM(s) IV Intermittent every 24 hours  folic acid 1 milliGRAM(s) Oral daily  hydrocortisone 10 milliGRAM(s) Oral every 12 hours  insulin regular  human corrective regimen sliding scale   SubCutaneous <User Schedule>  insulin regular  human recombinant 13 Unit(s) SubCutaneous every 6 hours  levETIRAcetam  Solution 500 milliGRAM(s) Oral every 24 hours  metoclopramide 5 milliGRAM(s) Oral every 12 hours  midodrine 10 milliGRAM(s) Oral every 8 hours  modafinil 100 milliGRAM(s) Oral daily  multivitamin 1 Tablet(s) Oral daily  piperacillin/tazobactam IVPB. 2.25 Gram(s) IV Intermittent every 8 hours    MEDICATIONS  (PRN):  dextrose Gel 1 Dose(s) Oral once PRN Blood Glucose LESS THAN 70 milliGRAM(s)/deciliter  glucagon  Injectable 1 milliGRAM(s) IntraMuscular once PRN Glucose LESS THAN 70 milligrams/deciliter    	                   LABS REVIEWED                          7.7    21.9  )-----------( 292      ( 2018 07:08 )             26.7       06-08    139  |  97  |  41<H>  ----------------------------<  200<H>  4.8   |  25  |  2.67<H>    Ca    9.2      2018 07:08  Mg     2.1     06-07                        IMAGING REVIEWED:    no new iamging  	  ADVANCED DIRECTIVES:    DNR     PSYCHOSOCIAL-SPIRITUAL ASSESSMENT:              Care plan unchanged          GOALS OF CARE DISCUSSION:       Palliative care info/counseling provided	           Family meeting-- tenatively for Friday [address] 15       Advanced Directives; DNR as of May 2018       See previous Palliative Medicine Note  	        REFERRALS:        Palliative Med        Unit SW/Case Mgmt              Patient/Family Support              Nutrition       Ethics       PT/OT Palliative Medicine asked to reconsult on this 63 year old male who has been hospitalized over 3 months.  Pt was admitted with cellulitis in 2018.  His course was complicated by septic shock, cardiogenic shock, renal failure, respiratory failure, multiple infections and cva with seizure activity.  Pt remains mechanically vented, hemodialysis dependant, completely disabled requiring complete care for ADLs.  He is fed enterally through a PEG tube. Pt has developed significant decubitus ulcer which is concerning for osteomyelitis (work up in progress). As pt is fecally incontinent, he continues to contaminate the wound with fecal matter.    Unclear at this point, what is achievable in regard to control of infection from this ulcer as well as steps involved to remediate it and the ongoing use of antibiotics to treat same.  Additionally, pt remains profoundly debilitated with impaired quality of life.    Interval history:  Pt remains trached to vent, being fed enterally with tube feeds.  Pt has ongoing diarrhea.  Pt is arousable when I enter the room.  He nods head yes when I ask him if he is trying to scratch his face.  Pt nods head "no" vigorously to the presence of pain.  Pt appears uncomfortable as he is trying to move around in bed.  His hands are up in the air, He has bilateral hand splints in place.     	                   PAIN (0-10):  0/10  DYSPNEA (Y/N):  no  NAUSEA/VOMITING (Y/N):  no  SECRETIONS (Y/N):  no  AGITATION (Y/N): yes mildly    OTHER REVIEW OF SYSTEMS:  UNABLE TO OBTAIN  due to:  altered mental status  	                 T(C): 37.8 (18 @ 09:26), Max: 37.9 (18 @ 02:47)  T(F): 100 (18 @ 09:26), Max: 100.3 (18 @ 02:47)  HR: 85 (18 @ 09:10) (70 - 105)  BP: 93/66 (18 @ 08:40) (63/51 - 127/55)  RR: 19 (18 @ 09:10) (10 - 24)  SpO2: 100% (18 @ 09:10) (98% - 100%)  Wt(kg): --    Daily     Daily Weight in k (2018 05:06)  (pt weight in 2018 is documented at 74 kgs - altho pt was fluid overloaded)    GENERAL:  arousable appears uncomfortable, not relaxed  HEENT:  NC/AT, normocephalic, sclera anicteric, temporal wasting  NECK:    supple, no JVD, + tracheostomy  CVS:     reg, + left chest wall PPM  RESP:  vented, course, sonorous breath sounds  GI:    + PEG tube, flat, nontender, +BS  :    aneuric, on HD  Rectal: sheets soiled with watery dark brown BM  MUSC:     no movement to Bilat LE, moving bilat UE against gravity  NEURO:     arousable, will answer questions, will open eyes to command, not following commands with extremities  PSYCH:    maritza       SKIN:         + sacral decub (not directly visualized by me)  LYMPH:      neg  	                   OPIATE NAÏVE (Y/N):  yes  ALLERGIES: No Known Allergies    MEDICATIONS  (STANDING):  acetaminophen    Suspension. 650 milliGRAM(s) Oral every 8 hours  acetylcysteine 20% Inhalation 4 milliLiter(s) Inhalation two times a day  aspirin  chewable 81 milliGRAM(s) Oral daily  atorvastatin 80 milliGRAM(s) Oral at bedtime  calcitriol  Solution 0.25 MICROGram(s) Oral daily  collagenase Ointment 1 Application(s) Topical daily  Dakins Solution - 1/4 Strength 1 Application(s) Topical daily  dextrose 5%. 1000 milliLiter(s) (50 mL/Hr) IV Continuous <Continuous>  dextrose 50% Injectable 12.5 Gram(s) IV Push once  dextrose 50% Injectable 25 Gram(s) IV Push once  dextrose 50% Injectable 25 Gram(s) IV Push once  escitalopram 5 milliGRAM(s) Oral daily  fluconAZOLE IVPB 200 milliGRAM(s) IV Intermittent every 24 hours  folic acid 1 milliGRAM(s) Oral daily  hydrocortisone 10 milliGRAM(s) Oral every 12 hours  insulin regular  human corrective regimen sliding scale   SubCutaneous <User Schedule>  insulin regular  human recombinant 13 Unit(s) SubCutaneous every 6 hours  levETIRAcetam  Solution 500 milliGRAM(s) Oral every 24 hours  metoclopramide 5 milliGRAM(s) Oral every 12 hours  midodrine 10 milliGRAM(s) Oral every 8 hours  modafinil 100 milliGRAM(s) Oral daily  multivitamin 1 Tablet(s) Oral daily  piperacillin/tazobactam IVPB. 2.25 Gram(s) IV Intermittent every 8 hours    MEDICATIONS  (PRN):  dextrose Gel 1 Dose(s) Oral once PRN Blood Glucose LESS THAN 70 milliGRAM(s)/deciliter  glucagon  Injectable 1 milliGRAM(s) IntraMuscular once PRN Glucose LESS THAN 70 milligrams/deciliter    	                   LABS REVIEWED                          7.7    21.9  )-----------( 292      ( 2018 07:08 )             26.7       06-08    139  |  97  |  41<H>  ----------------------------<  200<H>  4.8   |  25  |  2.67<H>    Ca    9.2      2018 07:08  Mg     2.1     06-07                        IMAGING REVIEWED:    no new iamging  	  ADVANCED DIRECTIVES:    DNR     PSYCHOSOCIAL-SPIRITUAL ASSESSMENT:              Care plan unchanged          GOALS OF CARE DISCUSSION:       Palliative care info/counseling provided	           Family meeting-- tenatively for Friday [address] 15       Advanced Directives; DNR as of May 2018       See previous Palliative Medicine Note  	        REFERRALS:        Palliative Med        Unit SW/Case Mgmt              Patient/Family Support              Nutrition       Ethics       PT/OT

## 2018-06-08 NOTE — PROGRESS NOTE ADULT - PROBLEM SELECTOR PLAN 3
DM2 on Januvia at home. HbA1C 8.6  - Initially hypoglycemic episodes - likely in setting of infection, now F/S 200-300s.  - Revert back to regular insulin 13u q 6 hrs  - ISS - Patient treated for Sacral Decub growing Pseudomonas for 7 days with IV Zosyn - since stopping, persistently elevating leukocytosis  - Low grade temp rectally 100.3 F (6/5) and 102.5 F (6/7)  - Will persist with full skin exam - new purulence in ulcer -Pelvis and Hip to assess for Osteo   - Repeat Blood Cx, NGTD  - UCx: Yeast  - C/w Fluconazole, 800mg loading dose, 200mg thereafter.     #Fungemia  Noted to have Candida Tropicalis growing in blood cultures and completed fluconazole course.

## 2018-06-08 NOTE — PROGRESS NOTE ADULT - PROBLEM SELECTOR PLAN 1
- Patient treated for Sacral Decub growing Pseudomonas for 7 days with IV Zosyn - since stopping, persistently elevating leukocytosis  - Low grade temp rectally 100.3 F (6/5) and 102.5 F (6/7)  - Will persist with full skin exam - new purulence in ulcer - to follow up with MRI Pelvis and Hip to assess for Osteo   - Repeat Blood Cx, NGTD  - UCx: Yeast  - C/w Fluconazole, 800mg loading dose, 200mg thereafter.     #Fungemia  Noted to have Candida Tropicalis growing in blood cultures and completed fluconazole course. Is this potentially related to tube feed formula, and perhaps a different formula would create less diarrhea?  Consider a rectal tube to control the movement of diarrhea and protect the sacral wound. d/w HS

## 2018-06-08 NOTE — PROGRESS NOTE ADULT - PROBLEM SELECTOR PLAN 7
Hx of Afib and LV thrombus on Coumadin prior to admission. Most recent TTE with [facility] shows no LV thrombus currently.   - Not currently on rate control as HR has remained stable but has received Lopressor pushes for HR goal <110  - dose Coumadin based on INR daily INR 3.10 this AM - will hold dose tonight    #LV thrombus  LV thrombus noted on RU[facility]SANA on 4/10. Remains therapeutic on warfarin.  - Dose Coumadin based on INR remains on HD but pt no longer requiring albumin In order to tolerate HD

## 2018-06-08 NOTE — PROGRESS NOTE ADULT - PROBLEM SELECTOR PROBLEM 5
CHIEF COMPLAINT:  \" I feel great\"    SUBJECTIVE:  The patient is a 81 year old year old male  Partial small bowel obstruction.  No nausea no vomiting passing gas no pain    OBJECTIVE:  GENERAL:  VITAL SIGNS:   Visit Vitals  /79 (BP Location: [facility], Patient Position: Semi-Nielsen's)   Pulse 82   Temp 96.9 °F (36.1 °C) (Temporal Artery)   Resp 16   Ht 5' 11\" (1.803 m)   Wt 81.8 kg   SpO2 95%   BMI 25.15 kg/m²     RESPIRATORY: Clear to ascultation  ABDOMEN: positive bowel sounds, soft non tender not distended  SKIN:no rashes   EXTREMITIES: no edema    INTAKE/OUTPUT:  I/O last 3 completed shifts:  In: 2361 [I.V.:2361]  Out: 2350 [Urine:2000; Drains:350]  I/O this shift:  In: -   Out: 325 [Urine:325]    LABORATORY DATA:    Recent Labs  Lab 09/02/17  0524 09/01/17  0551 08/31/17  1050   WBC 3.7* 4.5 7.9   HGB 12.2* 12.4* 13.2   HCT 36.0* 37.6* 38.7*    165 172     Recent Labs  Lab 09/02/17  0524 09/01/17  0551 08/31/17  1050   SODIUM 146* 143 144   POTASSIUM 3.8 4.0 5.2*   CHLORIDE 109* 108* 107   CO2 27 28 27   BUN 9 15 26*   CREATININE 0.88 0.95 1.11   GLUCOSE 105* 103* 124*   CALCIUM 8.6 8.3* 8.8   MG 1.9 1.8  --    GFRNA 81 75 62   GFRA >90 87 72   ALBUMIN  --   --  3.3*   AST  --   --  22   GPT  --   --  18   ALKPT  --   --  67   BILIRUBIN  --   --  0.6       ASSESSMENT:  The patient is a 81 year old male resolving partial small bowel obstruction.    PLAN:  1. Discontinue nasogastric tube, sips of clears  2. Ambulate  3. Monitor for abdominal pain, serial abdominal pain      Kalee Lang DO  9/2/2017     Encephalopathy, unspecified

## 2018-06-08 NOTE — PROGRESS NOTE ADULT - SUBJECTIVE AND OBJECTIVE BOX
INTERVAL HPI/OVERNIGHT EVENTS:    CONSTITUTIONAL:  Negative fever or chills, feels well, good appetite  EYES:  Negative  blurry vision or double vision  CARDIOVASCULAR:  Negative for chest pain or palpitations  RESPIRATORY:  Negative for cough, wheezing, or SOB   GASTROINTESTINAL:  Negative for nausea, vomiting, diarrhea, constipation, or abdominal pain  GENITOURINARY:  Negative frequency, urgency or dysuria  NEUROLOGIC:  No headache, confusion, dizziness, lightheadedness      ANTIBIOTICS/RELEVANT:    MEDICATIONS  (STANDING):  acetaminophen    Suspension. 650 milliGRAM(s) Oral every 8 hours  acetylcysteine 20% Inhalation 4 milliLiter(s) Inhalation two times a day  aspirin  chewable 81 milliGRAM(s) Oral daily  atorvastatin 80 milliGRAM(s) Oral at bedtime  calcitriol  Solution 0.25 MICROGram(s) Oral daily  collagenase Ointment 1 Application(s) Topical daily  Dakins Solution - 1/4 Strength 1 Application(s) Topical daily  dextrose 5%. 1000 milliLiter(s) (50 mL/Hr) IV Continuous <Continuous>  dextrose 50% Injectable 12.5 Gram(s) IV Push once  dextrose 50% Injectable 25 Gram(s) IV Push once  dextrose 50% Injectable 25 Gram(s) IV Push once  escitalopram 5 milliGRAM(s) Oral daily  fluconAZOLE IVPB 200 milliGRAM(s) IV Intermittent every 24 hours  folic acid 1 milliGRAM(s) Oral daily  hydrocortisone 10 milliGRAM(s) Oral every 12 hours  insulin regular  human corrective regimen sliding scale   SubCutaneous <User Schedule>  insulin regular  human recombinant 13 Unit(s) SubCutaneous every 6 hours  levETIRAcetam  Solution 500 milliGRAM(s) Oral every 24 hours  metoclopramide 5 milliGRAM(s) Oral every 12 hours  midodrine 10 milliGRAM(s) Oral every 8 hours  modafinil 100 milliGRAM(s) Oral daily  multivitamin 1 Tablet(s) Oral daily  piperacillin/tazobactam IVPB. 2.25 Gram(s) IV Intermittent every 8 hours    MEDICATIONS  (PRN):  dextrose Gel 1 Dose(s) Oral once PRN Blood Glucose LESS THAN 70 milliGRAM(s)/deciliter  glucagon  Injectable 1 milliGRAM(s) IntraMuscular once PRN Glucose LESS THAN 70 milligrams/deciliter        Vital Signs Last 24 Hrs  T(C): 37.5 (08 Jun 2018 05:06), Max: 38.3 (07 Jun 2018 08:30)  T(F): 99.5 (08 Jun 2018 05:06), Max: 100.9 (07 Jun 2018 08:30)  HR: 96 (08 Jun 2018 05:47) (70 - 106)  BP: 98/66 (08 Jun 2018 05:06) (63/51 - 127/55)  BP(mean): --  RR: 10 (08 Jun 2018 05:47) (10 - 29)  SpO2: 100% (08 Jun 2018 05:47) (96% - 100%)    06-07-18 @ 07:01  -  06-08-18 @ 07:00  --------------------------------------------------------  IN: 776 mL / OUT: 0 mL / NET: 776 mL      PHYSICAL EXAM:  Constitutional:Well-developed, well nourished  Eyes:MAXI, EOMI  Ear/Nose/Throat: no oral lesion, no sinus tenderness on percussion	  Neck:no JVD, no lymphadenopathy, supple  Respiratory: CTA morris  Cardiovascular: S1S2 RRR, no murmurs  Gastrointestinal:soft, (+) BS, no HSM  Extremities:no e/e/c  Vascular: DP Pulse:	right normal; left normal      LABS:                        7.7    21.9  )-----------( 292      ( 08 Jun 2018 07:08 )             26.7     06-07    139  |  95<L>  |  53<H>  ----------------------------<  158<H>  5.6<H>   |  24  |  3.65<H>    Ca    9.3      07 Jun 2018 06:50  Mg     2.1     06-07      PT/INR - ( 08 Jun 2018 07:08 )   PT: 28.5 sec;   INR: 2.52                MICROBIOLOGY:    RADIOLOGY & ADDITIONAL STUDIES: INTERVAL HPI/OVERNIGHT EVENTS:  Patient seen and examined at bedside. patient intermittently following commands but unable to voice complaints.     ROS: cannot elicit       ANTIBIOTICS/RELEVANT:    MEDICATIONS  (STANDING):  acetaminophen    Suspension. 650 milliGRAM(s) Oral every 8 hours  acetylcysteine 20% Inhalation 4 milliLiter(s) Inhalation two times a day  aspirin  chewable 81 milliGRAM(s) Oral daily  atorvastatin 80 milliGRAM(s) Oral at bedtime  calcitriol  Solution 0.25 MICROGram(s) Oral daily  collagenase Ointment 1 Application(s) Topical daily  Dakins Solution - 1/4 Strength 1 Application(s) Topical daily  dextrose 5%. 1000 milliLiter(s) (50 mL/Hr) IV Continuous <Continuous>  dextrose 50% Injectable 12.5 Gram(s) IV Push once  dextrose 50% Injectable 25 Gram(s) IV Push once  dextrose 50% Injectable 25 Gram(s) IV Push once  escitalopram 5 milliGRAM(s) Oral daily  fluconAZOLE IVPB 200 milliGRAM(s) IV Intermittent every 24 hours  folic acid 1 milliGRAM(s) Oral daily  hydrocortisone 10 milliGRAM(s) Oral every 12 hours  insulin regular  human corrective regimen sliding scale   SubCutaneous <User Schedule>  insulin regular  human recombinant 13 Unit(s) SubCutaneous every 6 hours  levETIRAcetam  Solution 500 milliGRAM(s) Oral every 24 hours  metoclopramide 5 milliGRAM(s) Oral every 12 hours  midodrine 10 milliGRAM(s) Oral every 8 hours  modafinil 100 milliGRAM(s) Oral daily  multivitamin 1 Tablet(s) Oral daily  piperacillin/tazobactam IVPB. 2.25 Gram(s) IV Intermittent every 8 hours    MEDICATIONS  (PRN):  dextrose Gel 1 Dose(s) Oral once PRN Blood Glucose LESS THAN 70 milliGRAM(s)/deciliter  glucagon  Injectable 1 milliGRAM(s) IntraMuscular once PRN Glucose LESS THAN 70 milligrams/deciliter        Vital Signs Last 24 Hrs  T(C): 37.5 (08 Jun 2018 05:06), Max: 38.3 (07 Jun 2018 08:30)  T(F): 99.5 (08 Jun 2018 05:06), Max: 100.9 (07 Jun 2018 08:30)  HR: 96 (08 Jun 2018 05:47) (70 - 106)  BP: 98/66 (08 Jun 2018 05:06) (63/51 - 127/55)  BP(mean): --  RR: 10 (08 Jun 2018 05:47) (10 - 29)  SpO2: 100% (08 Jun 2018 05:47) (96% - 100%)    06-07-18 @ 07:01  -  06-08-18 @ 07:00  --------------------------------------------------------  IN: 776 mL / OUT: 0 mL / NET: 776 mL      PHYSICAL EXAM:  Constitutional: chronically ill-appearing, currently on AC-VC; more diaphoretic today   Eyes:MAXI, EOMI  Ear/Nose/Throat: no oral lesion, no sinus tenderness on percussion	  Neck:no JVD, no lymphadenopathy, supple  Respiratory: decreased BS at bases secondary to poor effort   Cardiovascular: S1S2 RRR, no murmurs  Gastrointestinal: soft, (+) BS, no HSM  Extremities:no e/e/c; atrophy  Skin: deep stage 4 sacral decubitus ulcer to bone with some discharge and fecal residue (examined 6/7)      LABS:                        7.7    21.9  )-----------( 292      ( 08 Jun 2018 07:08 )             26.7     06-07    139  |  95<L>  |  53<H>  ----------------------------<  158<H>  5.6<H>   |  24  |  3.65<H>    Ca    9.3      07 Jun 2018 06:50  Mg     2.1     06-07      PT/INR - ( 08 Jun 2018 07:08 )   PT: 28.5 sec;   INR: 2.52                MICROBIOLOGY:    Fungus Identification (06.07.18 @ 21:59)    Culture Results:   Yeast isolated        Culture - Blood (06.07.18 @ 19:59)    Specimen Source: .Blood 2 Sets Of Blood Cultures    Culture Results:   No growth at 12 hours    Culture - Blood (06.07.18 @ 19:59)    Specimen Source: .Blood 2 Sets Of Blood Cultures    Culture Results:   No growth at 12 hours        RADIOLOGY & ADDITIONAL STUDIES: MRI PENDING       A/P: 64 yo male with prolonged hospital course, C. 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 fever curve trending downward and Leukocytosis improving; source may be secodnary to a UTICandida UTI.  - continue  fluconazole 200 mg IV q24h  - continue Zosyn 2.25g IV q8h  - trending  bcx-NTD   - pelvic/hip MRI ordered to evaluate for underlying osteomyelitis and abscess pending * as per primary team ,EP needs to evaluate patient as he needs to have AICD reset prior to MRI       Pending discussion INTERVAL HPI/OVERNIGHT EVENTS:  Patient seen and examined at bedside. patient intermittently following commands but unable to voice complaints.     ROS: cannot elicit       ANTIBIOTICS/RELEVANT:    MEDICATIONS  (STANDING):  acetaminophen    Suspension. 650 milliGRAM(s) Oral every 8 hours  acetylcysteine 20% Inhalation 4 milliLiter(s) Inhalation two times a day  aspirin  chewable 81 milliGRAM(s) Oral daily  atorvastatin 80 milliGRAM(s) Oral at bedtime  calcitriol  Solution 0.25 MICROGram(s) Oral daily  collagenase Ointment 1 Application(s) Topical daily  Dakins Solution - 1/4 Strength 1 Application(s) Topical daily  dextrose 5%. 1000 milliLiter(s) (50 mL/Hr) IV Continuous <Continuous>  dextrose 50% Injectable 12.5 Gram(s) IV Push once  dextrose 50% Injectable 25 Gram(s) IV Push once  dextrose 50% Injectable 25 Gram(s) IV Push once  escitalopram 5 milliGRAM(s) Oral daily  fluconAZOLE IVPB 200 milliGRAM(s) IV Intermittent every 24 hours  folic acid 1 milliGRAM(s) Oral daily  hydrocortisone 10 milliGRAM(s) Oral every 12 hours  insulin regular  human corrective regimen sliding scale   SubCutaneous <User Schedule>  insulin regular  human recombinant 13 Unit(s) SubCutaneous every 6 hours  levETIRAcetam  Solution 500 milliGRAM(s) Oral every 24 hours  metoclopramide 5 milliGRAM(s) Oral every 12 hours  midodrine 10 milliGRAM(s) Oral every 8 hours  modafinil 100 milliGRAM(s) Oral daily  multivitamin 1 Tablet(s) Oral daily  piperacillin/tazobactam IVPB. 2.25 Gram(s) IV Intermittent every 8 hours    MEDICATIONS  (PRN):  dextrose Gel 1 Dose(s) Oral once PRN Blood Glucose LESS THAN 70 milliGRAM(s)/deciliter  glucagon  Injectable 1 milliGRAM(s) IntraMuscular once PRN Glucose LESS THAN 70 milligrams/deciliter        Vital Signs Last 24 Hrs  T(C): 37.5 (08 Jun 2018 05:06), Max: 38.3 (07 Jun 2018 08:30)  T(F): 99.5 (08 Jun 2018 05:06), Max: 100.9 (07 Jun 2018 08:30)  HR: 96 (08 Jun 2018 05:47) (70 - 106)  BP: 98/66 (08 Jun 2018 05:06) (63/51 - 127/55)  BP(mean): --  RR: 10 (08 Jun 2018 05:47) (10 - 29)  SpO2: 100% (08 Jun 2018 05:47) (96% - 100%)    06-07-18 @ 07:01  -  06-08-18 @ 07:00  --------------------------------------------------------  IN: 776 mL / OUT: 0 mL / NET: 776 mL      PHYSICAL EXAM:  Constitutional: chronically ill-appearing, currently on AC-VC; more diaphoretic today   Eyes:MAXI, EOMI  Ear/Nose/Throat: no oral lesion, no sinus tenderness on percussion	  Neck:no JVD, no lymphadenopathy, supple  Respiratory: decreased BS at bases secondary to poor effort   Cardiovascular: S1S2 RRR, no murmurs  Gastrointestinal: soft, (+) BS, no HSM  Extremities:no e/e/c; atrophy  Skin: deep stage 4 sacral decubitus ulcer to bone with some discharge and fecal residue (examined 6/7)      LABS:                        7.7    21.9  )-----------( 292      ( 08 Jun 2018 07:08 )             26.7     06-07    139  |  95<L>  |  53<H>  ----------------------------<  158<H>  5.6<H>   |  24  |  3.65<H>    Ca    9.3      07 Jun 2018 06:50  Mg     2.1     06-07      PT/INR - ( 08 Jun 2018 07:08 )   PT: 28.5 sec;   INR: 2.52                MICROBIOLOGY:    Fungus Identification (06.07.18 @ 21:59)    Culture Results:   Yeast isolated        Culture - Blood (06.07.18 @ 19:59)    Specimen Source: .Blood 2 Sets Of Blood Cultures    Culture Results:   No growth at 12 hours    Culture - Blood (06.07.18 @ 19:59)    Specimen Source: .Blood 2 Sets Of Blood Cultures    Culture Results:   No growth at 12 hours        RADIOLOGY & ADDITIONAL STUDIES: MRI PENDING       A/P: 64 yo male with prolonged hospital course, C. 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 fever curve trending downward and Leukocytosis improving; source may be secodnary to a UTICandida UTI.  - continue  fluconazole 200 mg IV q24h  - continue Zosyn 2.25g IV q8h  - trending  bcx-NTD   - pelvic/hip MRI ordered to evaluate for underlying osteomyelitis and abscess pending * as per primary team ,EP needs to evaluate patient as he needs to have AICD reset prior to MRI   - pending GOC discussion with family today       Discussed with ID attending; will continue to follow

## 2018-06-08 NOTE — PROGRESS NOTE ADULT - PROBLEM SELECTOR PLAN 5
Intermittently not following commands.  Noted to have change in mental status during admission, with concerns of encephalopathy likely multifactorial in setting of septic and cardiogenic shock as well as c/b brain stem infarct.   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 Per neurology may have had ischemic event from hypoperfusion. Also showing disc protrusion at C3/C4 level coursing superiorly to the C2/C3 contacting the ventral spinal cord.   - Neurology was previously following  - c/w Modafinil  - c/w Keppra 500 mg qd with extra 250 mg post HD days for seizures remains vent dependent.

## 2018-06-08 NOTE — PROGRESS NOTE ADULT - PROBLEM SELECTOR PLAN 1
- Patient treated for Sacral Decub growing Pseudomonas for 7 days with IV Zosyn - since stopping, persistently elevating leukocytosis  - Low grade temp rectally 100.3 F (6/5) and 102.5 F (6/7)  - Will persist with full skin exam - new purulence in ulcer - to follow up with MRI Pelvis and Hip to assess for Osteo   - Repeat Blood Cx, NGTD  - UCx: Yeast  - C/w Fluconazole, 800mg loading dose, 200mg thereafter.     #Fungemia  Noted to have Candida Tropicalis growing in blood cultures and completed fluconazole course. - Patient treated for Sacral Decub growing Pseudomonas for 7 days with IV Zosyn - since stopping, persistently elevating leukocytosis  - Low grade temp rectally 100.3 F (6/5) and 102.5 F (6/7)  - On skin exam - new purulence in ulcer (since debrided) - to follow up with MRI Pelvis and Hip to assess for Osteo   - Repeat Blood Cx, NGTD - Fungal Blood Cx sent   - UCx: Candida Dubliniensis   - C/w Fluconazole, 800mg loading dose, 200mg thereafter.     #Fungemia  Noted to have Candida Tropicalis growing in blood cultures and completed fluconazole course.

## 2018-06-08 NOTE — PROGRESS NOTE ADULT - PROBLEM SELECTOR PLAN 10
VTE: Coumadin dosed nightly, with INR daily checks, goal 2-3  GI PPx: PPI  DNR- Made DNR, family wants escalation of care, pressors if needed.  F: No IVF in setting of HD  E: Replete electrolytes with caution in setting of HD  N: Jevity 1.5 goal rate 62cc/hr, per updated nutrition recs 5/21  Dis[facility], likely chronic vent facility + HD, pending SW placement. Dispo plans and SD plans discussed with daughter Cristal

## 2018-06-08 NOTE — PROGRESS NOTE ADULT - PROBLEM SELECTOR PLAN 3
Low K/Low phos/renal feeding  Continue Hectorol next during dialysis  Please check a repeat iPTH and serum phosphorous level with next set of labs (can draw labs before dialysis tomorrow)

## 2018-06-09 LAB
ANION GAP SERPL CALC-SCNC: 17 MMOL/L — SIGNIFICANT CHANGE UP (ref 5–17)
ANION GAP SERPL CALC-SCNC: 18 MMOL/L — HIGH (ref 5–17)
ANISOCYTOSIS BLD QL: SLIGHT — SIGNIFICANT CHANGE UP
BLD GP AB SCN SERPL QL: NEGATIVE — SIGNIFICANT CHANGE UP
BUN SERPL-MCNC: 55 MG/DL — HIGH (ref 7–23)
BUN SERPL-MCNC: 61 MG/DL — HIGH (ref 7–23)
CALCIUM SERPL-MCNC: 9.3 MG/DL — SIGNIFICANT CHANGE UP (ref 8.4–10.5)
CALCIUM SERPL-MCNC: 9.4 MG/DL — SIGNIFICANT CHANGE UP (ref 8.4–10.5)
CHLORIDE SERPL-SCNC: 96 MMOL/L — SIGNIFICANT CHANGE UP (ref 96–108)
CHLORIDE SERPL-SCNC: 96 MMOL/L — SIGNIFICANT CHANGE UP (ref 96–108)
CO2 SERPL-SCNC: 24 MMOL/L — SIGNIFICANT CHANGE UP (ref 22–31)
CO2 SERPL-SCNC: 26 MMOL/L — SIGNIFICANT CHANGE UP (ref 22–31)
CREAT SERPL-MCNC: 3.36 MG/DL — HIGH (ref 0.5–1.3)
CREAT SERPL-MCNC: 3.76 MG/DL — HIGH (ref 0.5–1.3)
CULTURE RESULTS: SIGNIFICANT CHANGE UP
CULTURE RESULTS: SIGNIFICANT CHANGE UP
GLUCOSE BLDC GLUCOMTR-MCNC: 113 MG/DL — HIGH (ref 70–99)
GLUCOSE BLDC GLUCOMTR-MCNC: 134 MG/DL — HIGH (ref 70–99)
GLUCOSE BLDC GLUCOMTR-MCNC: 210 MG/DL — HIGH (ref 70–99)
GLUCOSE BLDC GLUCOMTR-MCNC: 236 MG/DL — HIGH (ref 70–99)
GLUCOSE BLDC GLUCOMTR-MCNC: 70 MG/DL — SIGNIFICANT CHANGE UP (ref 70–99)
GLUCOSE SERPL-MCNC: 159 MG/DL — HIGH (ref 70–99)
GLUCOSE SERPL-MCNC: 229 MG/DL — HIGH (ref 70–99)
HCT VFR BLD CALC: 21.3 % — LOW (ref 39–50)
HCT VFR BLD CALC: 23.5 % — LOW (ref 39–50)
HCT VFR BLD CALC: 29.9 % — LOW (ref 39–50)
HGB BLD-MCNC: 6.2 G/DL — CRITICAL LOW (ref 13–17)
HGB BLD-MCNC: 7 G/DL — CRITICAL LOW (ref 13–17)
HGB BLD-MCNC: 9.3 G/DL — LOW (ref 13–17)
HYPOCHROMIA BLD QL: SLIGHT — SIGNIFICANT CHANGE UP
INR BLD: 3 — HIGH (ref 0.88–1.16)
IRON SATN MFR SERPL: 22 % — SIGNIFICANT CHANGE UP (ref 16–55)
IRON SATN MFR SERPL: 22 % — SIGNIFICANT CHANGE UP (ref 16–55)
IRON SATN MFR SERPL: 28 UG/DL — LOW (ref 45–165)
IRON SATN MFR SERPL: 28 UG/DL — LOW (ref 45–165)
LG PLATELETS BLD QL AUTO: PRESENT — SIGNIFICANT CHANGE UP
MAGNESIUM SERPL-MCNC: 2.3 MG/DL — SIGNIFICANT CHANGE UP (ref 1.6–2.6)
MCHC RBC-ENTMCNC: 25.4 PG — LOW (ref 27–34)
MCHC RBC-ENTMCNC: 26 PG — LOW (ref 27–34)
MCHC RBC-ENTMCNC: 26.8 PG — LOW (ref 27–34)
MCHC RBC-ENTMCNC: 29.1 G/DL — LOW (ref 32–36)
MCHC RBC-ENTMCNC: 29.8 G/DL — LOW (ref 32–36)
MCHC RBC-ENTMCNC: 31.1 G/DL — LOW (ref 32–36)
MCV RBC AUTO: 86.2 FL — SIGNIFICANT CHANGE UP (ref 80–100)
MCV RBC AUTO: 87.3 FL — SIGNIFICANT CHANGE UP (ref 80–100)
MCV RBC AUTO: 87.4 FL — SIGNIFICANT CHANGE UP (ref 80–100)
MICROCYTES BLD QL: SLIGHT — SIGNIFICANT CHANGE UP
PLAT MORPH BLD: ABNORMAL
PLATELET # BLD AUTO: 267 K/UL — SIGNIFICANT CHANGE UP (ref 150–400)
PLATELET # BLD AUTO: 276 K/UL — SIGNIFICANT CHANGE UP (ref 150–400)
PLATELET # BLD AUTO: 289 K/UL — SIGNIFICANT CHANGE UP (ref 150–400)
POIKILOCYTOSIS BLD QL AUTO: SLIGHT — SIGNIFICANT CHANGE UP
POLYCHROMASIA BLD QL SMEAR: SLIGHT — SIGNIFICANT CHANGE UP
POTASSIUM SERPL-MCNC: 5 MMOL/L — SIGNIFICANT CHANGE UP (ref 3.5–5.3)
POTASSIUM SERPL-MCNC: 5.4 MMOL/L — HIGH (ref 3.5–5.3)
POTASSIUM SERPL-SCNC: 5 MMOL/L — SIGNIFICANT CHANGE UP (ref 3.5–5.3)
POTASSIUM SERPL-SCNC: 5.4 MMOL/L — HIGH (ref 3.5–5.3)
PROTHROM AB SERPL-ACNC: 34.1 SEC — HIGH (ref 9.8–12.7)
RBC # BLD: 2.44 M/UL — LOW (ref 4.2–5.8)
RBC # BLD: 2.69 M/UL — LOW (ref 4.2–5.8)
RBC # BLD: 3.47 M/UL — LOW (ref 4.2–5.8)
RBC # FLD: 18.1 % — HIGH (ref 10.3–16.9)
RBC # FLD: 18.9 % — HIGH (ref 10.3–16.9)
RBC # FLD: 18.9 % — HIGH (ref 10.3–16.9)
RBC BLD AUTO: ABNORMAL
RH IG SCN BLD-IMP: POSITIVE — SIGNIFICANT CHANGE UP
SCHISTOCYTES BLD QL AUTO: SIGNIFICANT CHANGE UP
SODIUM SERPL-SCNC: 138 MMOL/L — SIGNIFICANT CHANGE UP (ref 135–145)
SODIUM SERPL-SCNC: 139 MMOL/L — SIGNIFICANT CHANGE UP (ref 135–145)
SPECIMEN SOURCE: SIGNIFICANT CHANGE UP
SPECIMEN SOURCE: SIGNIFICANT CHANGE UP
TIBC SERPL-MCNC: 125 UG/DL — LOW (ref 220–430)
TIBC SERPL-MCNC: 125 UG/DL — LOW (ref 220–430)
UIBC SERPL-MCNC: 97 UG/DL — LOW (ref 110–370)
UIBC SERPL-MCNC: 97 UG/DL — LOW (ref 110–370)
WBC # BLD: 20.8 K/UL — HIGH (ref 3.8–10.5)
WBC # BLD: 22.2 K/UL — HIGH (ref 3.8–10.5)
WBC # BLD: 23 K/UL — HIGH (ref 3.8–10.5)
WBC # FLD AUTO: 20.8 K/UL — HIGH (ref 3.8–10.5)
WBC # FLD AUTO: 22.2 K/UL — HIGH (ref 3.8–10.5)
WBC # FLD AUTO: 23 K/UL — HIGH (ref 3.8–10.5)

## 2018-06-09 PROCEDURE — 90935 HEMODIALYSIS ONE EVALUATION: CPT | Mod: GC

## 2018-06-09 PROCEDURE — 99233 SBSQ HOSP IP/OBS HIGH 50: CPT

## 2018-06-09 RX ORDER — ACETAMINOPHEN 500 MG
650 TABLET ORAL ONCE
Qty: 0 | Refills: 0 | Status: COMPLETED | OUTPATIENT
Start: 2018-06-09 | End: 2018-06-09

## 2018-06-09 RX ADMIN — Medication 5 MILLIGRAM(S): at 06:59

## 2018-06-09 RX ADMIN — Medication 10 MILLIGRAM(S): at 21:11

## 2018-06-09 RX ADMIN — Medication 650 MILLIGRAM(S): at 09:36

## 2018-06-09 RX ADMIN — Medication 650 MILLIGRAM(S): at 06:59

## 2018-06-09 RX ADMIN — INSULIN HUMAN 13 UNIT(S): 100 INJECTION, SOLUTION SUBCUTANEOUS at 12:19

## 2018-06-09 RX ADMIN — Medication 10 MILLIGRAM(S): at 09:36

## 2018-06-09 RX ADMIN — Medication 1 APPLICATION(S): at 13:39

## 2018-06-09 RX ADMIN — MIDODRINE HYDROCHLORIDE 10 MILLIGRAM(S): 2.5 TABLET ORAL at 18:59

## 2018-06-09 RX ADMIN — CALCITRIOL 0.25 MICROGRAM(S): 0.5 CAPSULE ORAL at 12:05

## 2018-06-09 RX ADMIN — Medication 4 MILLILITER(S): at 17:06

## 2018-06-09 RX ADMIN — MIDODRINE HYDROCHLORIDE 10 MILLIGRAM(S): 2.5 TABLET ORAL at 06:59

## 2018-06-09 RX ADMIN — Medication 81 MILLIGRAM(S): at 11:59

## 2018-06-09 RX ADMIN — INSULIN HUMAN 2: 100 INJECTION, SOLUTION SUBCUTANEOUS at 06:59

## 2018-06-09 RX ADMIN — PIPERACILLIN AND TAZOBACTAM 200 GRAM(S): 4; .5 INJECTION, POWDER, LYOPHILIZED, FOR SOLUTION INTRAVENOUS at 06:59

## 2018-06-09 RX ADMIN — LEVETIRACETAM 500 MILLIGRAM(S): 250 TABLET, FILM COATED ORAL at 19:00

## 2018-06-09 RX ADMIN — Medication 650 MILLIGRAM(S): at 07:59

## 2018-06-09 RX ADMIN — INSULIN HUMAN 13 UNIT(S): 100 INJECTION, SOLUTION SUBCUTANEOUS at 06:59

## 2018-06-09 RX ADMIN — ESCITALOPRAM OXALATE 5 MILLIGRAM(S): 10 TABLET, FILM COATED ORAL at 12:03

## 2018-06-09 RX ADMIN — Medication 650 MILLIGRAM(S): at 19:29

## 2018-06-09 RX ADMIN — FLUCONAZOLE 100 MILLIGRAM(S): 150 TABLET ORAL at 20:04

## 2018-06-09 RX ADMIN — Medication 4 MILLILITER(S): at 05:28

## 2018-06-09 RX ADMIN — MODAFINIL 100 MILLIGRAM(S): 200 TABLET ORAL at 12:02

## 2018-06-09 RX ADMIN — Medication 1 MILLIGRAM(S): at 12:03

## 2018-06-09 RX ADMIN — Medication 5 MILLIGRAM(S): at 18:59

## 2018-06-09 RX ADMIN — ATORVASTATIN CALCIUM 80 MILLIGRAM(S): 80 TABLET, FILM COATED ORAL at 21:11

## 2018-06-09 RX ADMIN — Medication 650 MILLIGRAM(S): at 18:59

## 2018-06-09 RX ADMIN — INSULIN HUMAN 2: 100 INJECTION, SOLUTION SUBCUTANEOUS at 12:19

## 2018-06-09 RX ADMIN — INSULIN HUMAN 13 UNIT(S): 100 INJECTION, SOLUTION SUBCUTANEOUS at 18:59

## 2018-06-09 RX ADMIN — ERYTHROPOIETIN 10000 UNIT(S): 10000 INJECTION, SOLUTION INTRAVENOUS; SUBCUTANEOUS at 16:49

## 2018-06-09 RX ADMIN — MIDODRINE HYDROCHLORIDE 10 MILLIGRAM(S): 2.5 TABLET ORAL at 12:02

## 2018-06-09 RX ADMIN — PIPERACILLIN AND TAZOBACTAM 200 GRAM(S): 4; .5 INJECTION, POWDER, LYOPHILIZED, FOR SOLUTION INTRAVENOUS at 19:20

## 2018-06-09 RX ADMIN — Medication 1 TABLET(S): at 12:04

## 2018-06-09 RX ADMIN — DOXERCALCIFEROL 2 MICROGRAM(S): 2.5 CAPSULE ORAL at 16:49

## 2018-06-09 NOTE — PROGRESS NOTE ADULT - PROBLEM SELECTOR PLAN 1
Patient is a 63 year old male with acute renal failure from ischemic ATN now requiring hemodialysis. Patient was last dialyzed on 6/7 with UF of 1.1L     P - Dialysis today   Revaclear 400, , , 2K bath   Goal UF 2.5 kg over 3 hours

## 2018-06-09 NOTE — PROGRESS NOTE ADULT - SUBJECTIVE AND OBJECTIVE BOX
Patient was seen and evaluated on dialysis.   Patient is tolerating the procedure well.   HR: 90 (06-09-18 @ 15:55)  BP: 93/66 (06-09-18 @ 15:55)  Continue dialysis:   Revaclear 400, , , 2K bath   Goal UF 2.5 kg over 3 hours.

## 2018-06-09 NOTE — PROGRESS NOTE ADULT - SUBJECTIVE AND OBJECTIVE BOX
Patient seen and examined at bedside. Patient is a 63 year old male with a history of HFrEF 10-15% due to ischemic cardiomyopathy, s/p AICD, ?atrial fibrillation, hypertension, diabetes mellitus type 2, gout, and CKD for whom nephrology was called for GILMER from ATN requiring hemodialysis. Patient was last dialyzed 6/7 with UF of 1120cc. Patient is clinically unchanged.     acetaminophen    Suspension. 650 milliGRAM(s) every 8 hours  acetylcysteine 20% Inhalation 4 milliLiter(s) two times a day  aspirin  chewable 81 milliGRAM(s) daily  atorvastatin 80 milliGRAM(s) at bedtime  calcitriol  Solution 0.25 MICROGram(s) daily  collagenase Ointment 1 Application(s) daily  Dakins Solution - 1/4 Strength 1 Application(s) daily  dextrose 5%. 1000 milliLiter(s) <Continuous>  dextrose 50% Injectable 12.5 Gram(s) once  dextrose 50% Injectable 25 Gram(s) once  dextrose 50% Injectable 25 Gram(s) once  dextrose Gel 1 Dose(s) once PRN  escitalopram 5 milliGRAM(s) daily  fluconAZOLE IVPB 200 milliGRAM(s) every 24 hours  folic acid 1 milliGRAM(s) daily  glucagon  Injectable 1 milliGRAM(s) once PRN  hydrocortisone 10 milliGRAM(s) every 12 hours  insulin regular  human corrective regimen sliding scale   <User Schedule>  insulin regular  human recombinant 13 Unit(s) every 6 hours  levETIRAcetam  Solution 500 milliGRAM(s) every 24 hours  metoclopramide 5 milliGRAM(s) every 12 hours  midodrine 10 milliGRAM(s) every 8 hours  modafinil 100 milliGRAM(s) daily  multivitamin 1 Tablet(s) daily  piperacillin/tazobactam IVPB. 2.25 Gram(s) every 8 hours    Allergies    No Known Allergies    Intolerances    T(C): , Max: 38 (06-09-18 @ 08:54)  T(F): , Max: 100.4 (06-09-18 @ 08:54)  HR: 90 (06-09-18 @ 15:55)  BP: 93/66 (06-09-18 @ 15:55)  RR: 18 (06-09-18 @ 15:55)  SpO2: 100% (06-09-18 @ 15:55)    06-08 @ 07:01  -  06-09 @ 07:00  --------------------------------------------------------  IN:    Jevity: 744 mL    Solution: 200 mL  Total IN: 944 mL    OUT:  Total OUT: 0 mL    Total NET: 944 mL    LABS:                        6.2    20.8  )-----------( 289      ( 09 Jun 2018 12:30 )             21.3     06-09    139  |  96  |  61<H>  ----------------------------<  159<H>  5.0   |  26  |  3.76<H>    Ca    9.4      09 Jun 2018 16:09  Mg     2.3     06-09    PT/INR - ( 09 Jun 2018 07:00 )   PT: 34.1 sec;   INR: 3.00

## 2018-06-09 NOTE — PROVIDER CONTACT NOTE (CRITICAL VALUE NOTIFICATION) - BACKGROUND
patient dialysis catheter removed yesterday for possible infection.  not receiving dialysis today
Hx sepsis
Patient s/p debridement of sacral pressure sore 6/8/2018 done by plastics
patient on HD

## 2018-06-09 NOTE — PROGRESS NOTE ADULT - ASSESSMENT
63M here for almost 3 months with multiple medical problems and complications.  Now with active issues of sepsis, GILMER, resp failure with trach dependence, encephalophty, anemia.

## 2018-06-09 NOTE — PROGRESS NOTE ADULT - SUBJECTIVE AND OBJECTIVE BOX
Pt unable to communicate.  Grimaces with touch.  PT received transfusion today.     Tmax 100.4  VSS otw    Eyes closed. intermittent grimacing with tactile stimuli  mmm  trach+  dry rhonchi.    RRR  soft abd  no edema    labs reviewed

## 2018-06-09 NOTE — PROGRESS NOTE ADULT - PROBLEM SELECTOR PLAN 7
Hx of Afib and LV thrombus on Coumadin prior to admission. Most recent TTE with [facility] shows no LV thrombus currently.   HR goal <110  on AC for LV thrombus also.  Daily INR.  Adjust coumadin dose.

## 2018-06-10 LAB
ANION GAP SERPL CALC-SCNC: 17 MMOL/L — SIGNIFICANT CHANGE UP (ref 5–17)
BUN SERPL-MCNC: 36 MG/DL — HIGH (ref 7–23)
CALCIUM SERPL-MCNC: 9.6 MG/DL — SIGNIFICANT CHANGE UP (ref 8.4–10.5)
CHLORIDE SERPL-SCNC: 95 MMOL/L — LOW (ref 96–108)
CO2 SERPL-SCNC: 26 MMOL/L — SIGNIFICANT CHANGE UP (ref 22–31)
CREAT SERPL-MCNC: 2.44 MG/DL — HIGH (ref 0.5–1.3)
GLUCOSE BLDC GLUCOMTR-MCNC: 103 MG/DL — HIGH (ref 70–99)
GLUCOSE BLDC GLUCOMTR-MCNC: 120 MG/DL — HIGH (ref 70–99)
GLUCOSE BLDC GLUCOMTR-MCNC: 233 MG/DL — HIGH (ref 70–99)
GLUCOSE BLDC GLUCOMTR-MCNC: 259 MG/DL — HIGH (ref 70–99)
GLUCOSE SERPL-MCNC: 168 MG/DL — HIGH (ref 70–99)
HCT VFR BLD CALC: 29.9 % — LOW (ref 39–50)
HGB BLD-MCNC: 9.1 G/DL — LOW (ref 13–17)
INR BLD: 4.07 — HIGH (ref 0.88–1.16)
MAGNESIUM SERPL-MCNC: 2.1 MG/DL — SIGNIFICANT CHANGE UP (ref 1.6–2.6)
MCHC RBC-ENTMCNC: 26.1 PG — LOW (ref 27–34)
MCHC RBC-ENTMCNC: 30.4 G/DL — LOW (ref 32–36)
MCV RBC AUTO: 85.7 FL — SIGNIFICANT CHANGE UP (ref 80–100)
PHOSPHATE SERPL-MCNC: 4 MG/DL — SIGNIFICANT CHANGE UP (ref 2.5–4.5)
PLATELET # BLD AUTO: 299 K/UL — SIGNIFICANT CHANGE UP (ref 150–400)
POTASSIUM SERPL-MCNC: 4.9 MMOL/L — SIGNIFICANT CHANGE UP (ref 3.5–5.3)
POTASSIUM SERPL-SCNC: 4.9 MMOL/L — SIGNIFICANT CHANGE UP (ref 3.5–5.3)
PROTHROM AB SERPL-ACNC: 46.5 SEC — HIGH (ref 9.8–12.7)
PTH-INTACT FLD-MCNC: 106 PG/ML — HIGH (ref 15–65)
RBC # BLD: 3.49 M/UL — LOW (ref 4.2–5.8)
RBC # FLD: 18.7 % — HIGH (ref 10.3–16.9)
SODIUM SERPL-SCNC: 138 MMOL/L — SIGNIFICANT CHANGE UP (ref 135–145)
WBC # BLD: 25.7 K/UL — HIGH (ref 3.8–10.5)
WBC # FLD AUTO: 25.7 K/UL — HIGH (ref 3.8–10.5)

## 2018-06-10 PROCEDURE — 99233 SBSQ HOSP IP/OBS HIGH 50: CPT | Mod: GC

## 2018-06-10 PROCEDURE — 99231 SBSQ HOSP IP/OBS SF/LOW 25: CPT | Mod: GC

## 2018-06-10 RX ADMIN — MIDODRINE HYDROCHLORIDE 10 MILLIGRAM(S): 2.5 TABLET ORAL at 05:39

## 2018-06-10 RX ADMIN — MODAFINIL 100 MILLIGRAM(S): 200 TABLET ORAL at 12:29

## 2018-06-10 RX ADMIN — PIPERACILLIN AND TAZOBACTAM 200 GRAM(S): 4; .5 INJECTION, POWDER, LYOPHILIZED, FOR SOLUTION INTRAVENOUS at 17:24

## 2018-06-10 RX ADMIN — Medication 650 MILLIGRAM(S): at 03:00

## 2018-06-10 RX ADMIN — INSULIN HUMAN 13 UNIT(S): 100 INJECTION, SOLUTION SUBCUTANEOUS at 06:55

## 2018-06-10 RX ADMIN — Medication 10 MILLIGRAM(S): at 12:28

## 2018-06-10 RX ADMIN — Medication 650 MILLIGRAM(S): at 22:00

## 2018-06-10 RX ADMIN — MIDODRINE HYDROCHLORIDE 10 MILLIGRAM(S): 2.5 TABLET ORAL at 17:24

## 2018-06-10 RX ADMIN — LEVETIRACETAM 500 MILLIGRAM(S): 250 TABLET, FILM COATED ORAL at 15:18

## 2018-06-10 RX ADMIN — Medication 5 MILLIGRAM(S): at 06:54

## 2018-06-10 RX ADMIN — Medication 1 MILLIGRAM(S): at 15:17

## 2018-06-10 RX ADMIN — INSULIN HUMAN 13 UNIT(S): 100 INJECTION, SOLUTION SUBCUTANEOUS at 17:27

## 2018-06-10 RX ADMIN — ESCITALOPRAM OXALATE 5 MILLIGRAM(S): 10 TABLET, FILM COATED ORAL at 15:17

## 2018-06-10 RX ADMIN — Medication 4 MILLILITER(S): at 17:24

## 2018-06-10 RX ADMIN — Medication 81 MILLIGRAM(S): at 12:28

## 2018-06-10 RX ADMIN — Medication 5 MILLIGRAM(S): at 17:24

## 2018-06-10 RX ADMIN — Medication 650 MILLIGRAM(S): at 02:14

## 2018-06-10 RX ADMIN — Medication 1 APPLICATION(S): at 12:35

## 2018-06-10 RX ADMIN — CALCITRIOL 0.25 MICROGRAM(S): 0.5 CAPSULE ORAL at 12:28

## 2018-06-10 RX ADMIN — INSULIN HUMAN 13 UNIT(S): 100 INJECTION, SOLUTION SUBCUTANEOUS at 00:06

## 2018-06-10 RX ADMIN — Medication 650 MILLIGRAM(S): at 14:35

## 2018-06-10 RX ADMIN — Medication 650 MILLIGRAM(S): at 12:26

## 2018-06-10 RX ADMIN — PIPERACILLIN AND TAZOBACTAM 200 GRAM(S): 4; .5 INJECTION, POWDER, LYOPHILIZED, FOR SOLUTION INTRAVENOUS at 02:14

## 2018-06-10 RX ADMIN — FLUCONAZOLE 100 MILLIGRAM(S): 150 TABLET ORAL at 15:18

## 2018-06-10 RX ADMIN — Medication 10 MILLIGRAM(S): at 21:48

## 2018-06-10 RX ADMIN — PIPERACILLIN AND TAZOBACTAM 200 GRAM(S): 4; .5 INJECTION, POWDER, LYOPHILIZED, FOR SOLUTION INTRAVENOUS at 10:24

## 2018-06-10 RX ADMIN — INSULIN HUMAN 13 UNIT(S): 100 INJECTION, SOLUTION SUBCUTANEOUS at 12:30

## 2018-06-10 RX ADMIN — ATORVASTATIN CALCIUM 80 MILLIGRAM(S): 80 TABLET, FILM COATED ORAL at 21:48

## 2018-06-10 RX ADMIN — Medication 1 APPLICATION(S): at 12:36

## 2018-06-10 RX ADMIN — Medication 4 MILLILITER(S): at 05:39

## 2018-06-10 RX ADMIN — INSULIN HUMAN 2: 100 INJECTION, SOLUTION SUBCUTANEOUS at 12:30

## 2018-06-10 RX ADMIN — MIDODRINE HYDROCHLORIDE 10 MILLIGRAM(S): 2.5 TABLET ORAL at 12:29

## 2018-06-10 RX ADMIN — Medication 650 MILLIGRAM(S): at 21:48

## 2018-06-10 RX ADMIN — Medication 1 TABLET(S): at 12:29

## 2018-06-10 NOTE — PROGRESS NOTE ADULT - PROBLEM SELECTOR PLAN 1
Patient is a 63 year old male with acute renal failure from ischemic ATN now requiring hemodialysis. Patient was last dialyzed on 6/9 with UF of 2.5L     P - Patient does not require emergent dialysis as volume status is acceptable   Anticipate next dialysis on 6/12

## 2018-06-10 NOTE — PROGRESS NOTE ADULT - PROBLEM SELECTOR PLAN 10
VTE: Coumadin dosed nightly, with INR daily checks, goal 2-3  GI PPx: PPI  DNR- Made DNR, family wants escalation of care, pressors if needed.  F: No IVF in setting of HD  E: Replete electrolytes with caution in setting of HD  N: Jevity 1.5 goal rate 62cc/hr, per updated nutrition recs 5/21  Dispo: Patient disposition pending fungal culture control. Plan to [facility]

## 2018-06-10 NOTE — PROGRESS NOTE ADULT - PROBLEM SELECTOR PLAN 8
Likely 2/2 CKD, No bloody output. Prior studies consistent with Anemia of chronic disease. S/P 2 u pRBC with dialysis yesterday. Most likely 2/2 to debridement  -Stable H/H this AM  - Keep active T&S, Monitor H/H

## 2018-06-10 NOTE — PROGRESS NOTE ADULT - SUBJECTIVE AND OBJECTIVE BOX
Patient seen and examined at bedside. Patient is a 63 year old male with a history of HFrEF 10-15% due to ischemic cardiomyopathy, s/p AICD, ?atrial fibrillation, hypertension, diabetes mellitus type 2, gout, and CKD for whom nephrology was called for GILMER from ATN requiring hemodialysis. Patient appears to be in no distress. Patient was last dialyzed 6/9 with UF of 2.5L     acetaminophen    Suspension. 650 milliGRAM(s) every 8 hours  acetylcysteine 20% Inhalation 4 milliLiter(s) two times a day  aspirin  chewable 81 milliGRAM(s) daily  atorvastatin 80 milliGRAM(s) at bedtime  calcitriol  Solution 0.25 MICROGram(s) daily  collagenase Ointment 1 Application(s) daily  Dakins Solution - 1/4 Strength 1 Application(s) daily  dextrose 5%. 1000 milliLiter(s) <Continuous>  dextrose 50% Injectable 12.5 Gram(s) once  dextrose 50% Injectable 25 Gram(s) once  dextrose 50% Injectable 25 Gram(s) once  dextrose Gel 1 Dose(s) once PRN  escitalopram 5 milliGRAM(s) daily  fluconAZOLE IVPB 200 milliGRAM(s) every 24 hours  folic acid 1 milliGRAM(s) daily  glucagon  Injectable 1 milliGRAM(s) once PRN  hydrocortisone 10 milliGRAM(s) every 12 hours  insulin regular  human corrective regimen sliding scale   <User Schedule>  insulin regular  human recombinant 13 Unit(s) every 6 hours  levETIRAcetam  Solution 500 milliGRAM(s) every 24 hours  metoclopramide 5 milliGRAM(s) every 12 hours  midodrine 10 milliGRAM(s) every 8 hours  modafinil 100 milliGRAM(s) daily  multivitamin 1 Tablet(s) daily  piperacillin/tazobactam IVPB. 2.25 Gram(s) every 8 hours    Allergies    No Known Allergies    Intolerances    T(C): , Max: 37.6 (06-10-18 @ 05:10)  T(F): , Max: 99.6 (06-10-18 @ 05:10)  HR: 97 (06-10-18 @ 12:31)  BP: 100/70 (06-10-18 @ 08:39)  RR: 16 (06-10-18 @ 08:39)  SpO2: 100% (06-10-18 @ 12:31)    06-09 @ 07:01  -  06-10 @ 07:00  --------------------------------------------------------  IN:    Enteral Tube Flush: 360 mL    Jevity: 682 mL  Total IN: 1042 mL    OUT:    Other: 2500 mL  Total OUT: 2500 mL    Total NET: -1458 mL    LABS:                        9.1    25.7  )-----------( 299      ( 10 Gurmeet 2018 07:01 )             29.9     06-10    138  |  95<L>  |  36<H>  ----------------------------<  168<H>  4.9   |  26  |  2.44<H>    Ca    9.6      10 Gurmeet 2018 07:04  Phos  4.0     06-10  Mg     2.1     06-10    PT/INR - ( 09 Jun 2018 07:00 )   PT: 34.1 sec;   INR: 3.00

## 2018-06-10 NOTE — PROGRESS NOTE ADULT - PROBLEM SELECTOR PLAN 1
- Patient treated for Sacral Decub growing Pseudomonas for 7 days with IV Zosyn - since stopping, persistently elevating leukocytosis  - Low grade temp rectally 100.3 F (6/5) and 102.5 F (6/7)  - On skin exam - new purulence in ulcer (since debrided) - to follow up with MRI Pelvis and Hip to assess for Osteo   - Repeat Blood Cx, NGTD - Fungal Blood Cx sent   - UCx: Candida Dubliniensis   - C/w Fluconazole, 800mg loading dose, 200mg thereafter.     #Fungemia  Noted to have Candida Tropicalis growing in blood cultures and completed fluconazole course.

## 2018-06-10 NOTE — PROGRESS NOTE ADULT - SUBJECTIVE AND OBJECTIVE BOX
O/N Events: ELSIE. Post transfusion CBC stable.   Subjective/ROS: Patient shakes head when asked if he has any pain.     VITALS  Vital Signs Last 24 Hrs  T(C): 36.7 (10 Gurmeet 2018 08:39), Max: 37.6 (10 Gurmeet 2018 05:10)  T(F): 98.1 (10 Gurmeet 2018 08:39), Max: 99.6 (10 Gurmeet 2018 05:10)  HR: 97 (10 Gurmeet 2018 08:46) (83 - 98)  BP: 100/70 (10 Gurmeet 2018 08:39) (93/66 - 124/77)  BP(mean): --  RR: 16 (10 Gurmeet 2018 08:39) (16 - 20)  SpO2: 98% (10 Gurmeet 2018 08:46) (95% - 100%)    CAPILLARY BLOOD GLUCOSE  POCT Blood Glucose.: 120 mg/dL (10 Gurmeet 2018 06:00)  POCT Blood Glucose.: 113 mg/dL (09 Jun 2018 23:49)  POCT Blood Glucose.: 134 mg/dL (09 Jun 2018 18:42)  POCT Blood Glucose.: 210 mg/dL (09 Jun 2018 12:13)      PHYSICAL EXAM  Constitutional: Patient lying in bed, no acute distress. Trach in situ, Ventilator Dependent.   HEENT: PERRLA  Neck: Trach present, no sign of leak/drainage  Respiratory: CTABL  Cardiovascular: RRR, S1/S2, no r/m/g  Gastrointestinal: BS+, soft, slightly distended and tympanic to percussion.   Extremities: No peripheral edema  Vascular: 2+ peripheral pulses  Neurological: A/O x 3, CN V-XII grossly intact.  Psychiatric: Normal mood, normal affect  Musculoskeletal: 5/5 strength b/l upper and lower extremities  Skin: B/l venous stasis changes present.     MEDICATIONS  (STANDING):  acetaminophen    Suspension. 650 milliGRAM(s) Oral every 8 hours  acetylcysteine 20% Inhalation 4 milliLiter(s) Inhalation two times a day  aspirin  chewable 81 milliGRAM(s) Oral daily  atorvastatin 80 milliGRAM(s) Oral at bedtime  calcitriol  Solution 0.25 MICROGram(s) Oral daily  collagenase Ointment 1 Application(s) Topical daily  Dakins Solution - 1/4 Strength 1 Application(s) Topical daily  dextrose 5%. 1000 milliLiter(s) (50 mL/Hr) IV Continuous <Continuous>  dextrose 50% Injectable 12.5 Gram(s) IV Push once  dextrose 50% Injectable 25 Gram(s) IV Push once  dextrose 50% Injectable 25 Gram(s) IV Push once  escitalopram 5 milliGRAM(s) Oral daily  fluconAZOLE IVPB 200 milliGRAM(s) IV Intermittent every 24 hours  folic acid 1 milliGRAM(s) Oral daily  hydrocortisone 10 milliGRAM(s) Oral every 12 hours  insulin regular  human corrective regimen sliding scale   SubCutaneous <User Schedule>  insulin regular  human recombinant 13 Unit(s) SubCutaneous every 6 hours  levETIRAcetam  Solution 500 milliGRAM(s) Oral every 24 hours  metoclopramide 5 milliGRAM(s) Oral every 12 hours  midodrine 10 milliGRAM(s) Oral every 8 hours  modafinil 100 milliGRAM(s) Oral daily  multivitamin 1 Tablet(s) Oral daily  piperacillin/tazobactam IVPB. 2.25 Gram(s) IV Intermittent every 8 hours    MEDICATIONS  (PRN):  dextrose Gel 1 Dose(s) Oral once PRN Blood Glucose LESS THAN 70 milliGRAM(s)/deciliter  glucagon  Injectable 1 milliGRAM(s) IntraMuscular once PRN Glucose LESS THAN 70 milligrams/deciliter      No Known Allergies      LABS                        9.1    25.7  )-----------( 299      ( 10 Gurmeet 2018 07:01 )             29.9     06-10    138  |  95<L>  |  36<H>  ----------------------------<  168<H>  4.9   |  26  |  2.44<H>    Ca    9.6      10 Gurmeet 2018 07:04  Phos  4.0     06-10  Mg     2.1     06-10      PT/INR - ( 09 Jun 2018 07:00 )   PT: 34.1 sec;   INR: 3.00

## 2018-06-10 NOTE — PROGRESS NOTE ADULT - ASSESSMENT
63M PMH HFrEF 10-15% (ischemic), MI, s/p AICD vs PPM, possible Afib, HTN, DM2 on insulin, possible CKD, and gout, who presented with a chief complaint of generalized weakness (3/10/2018) s/p septic shock likely 2/2 LE cellulitis complicated by ATN requiring dialysis. Course complicated by AMS likely from brainstem infarct 2/2 LV thrombus vs toxic metabolic encephalopathy. Further complicated by development of candidemia and sepsis 2/2 klebsiella bacteremia s/p fluconazole and CTX, now resolved. s/p completed course of Zosyn for aspiration PNA.  Goals of care discussion for which patient made DNR, but with continued escalation of care. Continues to undergo management for respiratory failure with weaning as tolerated with CPAP, trach collar trials. With complications of hypotension, tolerating HD w/o albumin. Stable  for continued vent weaning prior to d/c to long term care facility with HD+vent weaning.

## 2018-06-10 NOTE — PROGRESS NOTE ADULT - PROBLEM SELECTOR PLAN 5
Patient noted to have persistent leukocytosis. Source of infection thought to be due to sacral ulcer leading to osteomyelitis. ID on board. Will attend goals of care meeting.

## 2018-06-11 LAB
24R-OH-CALCIDIOL SERPL-MCNC: 41.9 NG/ML — SIGNIFICANT CHANGE UP (ref 30–80)
ANION GAP SERPL CALC-SCNC: 18 MMOL/L — HIGH (ref 5–17)
BUN SERPL-MCNC: 53 MG/DL — HIGH (ref 7–23)
CALCIUM SERPL-MCNC: 8.8 MG/DL — SIGNIFICANT CHANGE UP (ref 8.4–10.5)
CALCIUM SERPL-MCNC: 9.2 MG/DL — SIGNIFICANT CHANGE UP (ref 8.4–10.5)
CHLORIDE SERPL-SCNC: 95 MMOL/L — LOW (ref 96–108)
CO2 SERPL-SCNC: 23 MMOL/L — SIGNIFICANT CHANGE UP (ref 22–31)
CREAT SERPL-MCNC: 3.34 MG/DL — HIGH (ref 0.5–1.3)
GLUCOSE BLDC GLUCOMTR-MCNC: 110 MG/DL — HIGH (ref 70–99)
GLUCOSE BLDC GLUCOMTR-MCNC: 204 MG/DL — HIGH (ref 70–99)
GLUCOSE BLDC GLUCOMTR-MCNC: 234 MG/DL — HIGH (ref 70–99)
GLUCOSE SERPL-MCNC: 260 MG/DL — HIGH (ref 70–99)
INR BLD: 3.74 — HIGH (ref 0.88–1.16)
MAGNESIUM SERPL-MCNC: 2.2 MG/DL — SIGNIFICANT CHANGE UP (ref 1.6–2.6)
POTASSIUM SERPL-MCNC: 5.7 MMOL/L — HIGH (ref 3.5–5.3)
POTASSIUM SERPL-SCNC: 5.7 MMOL/L — HIGH (ref 3.5–5.3)
PROTHROM AB SERPL-ACNC: 42.7 SEC — HIGH (ref 9.8–12.7)
SODIUM SERPL-SCNC: 136 MMOL/L — SIGNIFICANT CHANGE UP (ref 135–145)
T PALLIDUM AB TITR SER: NEGATIVE — SIGNIFICANT CHANGE UP
VIT D25+D1,25 OH+D1,25 PNL SERPL-MCNC: 53.1 PG/ML — SIGNIFICANT CHANGE UP (ref 19.9–79.3)

## 2018-06-11 PROCEDURE — 99232 SBSQ HOSP IP/OBS MODERATE 35: CPT | Mod: GC

## 2018-06-11 PROCEDURE — 99233 SBSQ HOSP IP/OBS HIGH 50: CPT | Mod: GC

## 2018-06-11 RX ADMIN — Medication 1 TABLET(S): at 11:48

## 2018-06-11 RX ADMIN — Medication 81 MILLIGRAM(S): at 11:48

## 2018-06-11 RX ADMIN — Medication 4 MILLILITER(S): at 17:21

## 2018-06-11 RX ADMIN — MIDODRINE HYDROCHLORIDE 10 MILLIGRAM(S): 2.5 TABLET ORAL at 11:49

## 2018-06-11 RX ADMIN — Medication 650 MILLIGRAM(S): at 23:00

## 2018-06-11 RX ADMIN — Medication 650 MILLIGRAM(S): at 06:11

## 2018-06-11 RX ADMIN — INSULIN HUMAN 3: 100 INJECTION, SOLUTION SUBCUTANEOUS at 00:20

## 2018-06-11 RX ADMIN — Medication 1 APPLICATION(S): at 11:53

## 2018-06-11 RX ADMIN — PIPERACILLIN AND TAZOBACTAM 200 GRAM(S): 4; .5 INJECTION, POWDER, LYOPHILIZED, FOR SOLUTION INTRAVENOUS at 09:50

## 2018-06-11 RX ADMIN — Medication 5 MILLIGRAM(S): at 17:21

## 2018-06-11 RX ADMIN — MIDODRINE HYDROCHLORIDE 10 MILLIGRAM(S): 2.5 TABLET ORAL at 06:53

## 2018-06-11 RX ADMIN — Medication 4 MILLILITER(S): at 07:46

## 2018-06-11 RX ADMIN — FLUCONAZOLE 100 MILLIGRAM(S): 150 TABLET ORAL at 14:25

## 2018-06-11 RX ADMIN — CALCITRIOL 0.25 MICROGRAM(S): 0.5 CAPSULE ORAL at 11:48

## 2018-06-11 RX ADMIN — Medication 650 MILLIGRAM(S): at 07:00

## 2018-06-11 RX ADMIN — Medication 10 MILLIGRAM(S): at 11:49

## 2018-06-11 RX ADMIN — ESCITALOPRAM OXALATE 5 MILLIGRAM(S): 10 TABLET, FILM COATED ORAL at 11:49

## 2018-06-11 RX ADMIN — INSULIN HUMAN 2: 100 INJECTION, SOLUTION SUBCUTANEOUS at 06:54

## 2018-06-11 RX ADMIN — INSULIN HUMAN 13 UNIT(S): 100 INJECTION, SOLUTION SUBCUTANEOUS at 18:04

## 2018-06-11 RX ADMIN — PIPERACILLIN AND TAZOBACTAM 200 GRAM(S): 4; .5 INJECTION, POWDER, LYOPHILIZED, FOR SOLUTION INTRAVENOUS at 01:51

## 2018-06-11 RX ADMIN — ATORVASTATIN CALCIUM 80 MILLIGRAM(S): 80 TABLET, FILM COATED ORAL at 22:20

## 2018-06-11 RX ADMIN — Medication 5 MILLIGRAM(S): at 06:53

## 2018-06-11 RX ADMIN — INSULIN HUMAN 13 UNIT(S): 100 INJECTION, SOLUTION SUBCUTANEOUS at 00:19

## 2018-06-11 RX ADMIN — INSULIN HUMAN 13 UNIT(S): 100 INJECTION, SOLUTION SUBCUTANEOUS at 12:24

## 2018-06-11 RX ADMIN — Medication 650 MILLIGRAM(S): at 14:34

## 2018-06-11 RX ADMIN — Medication 650 MILLIGRAM(S): at 15:29

## 2018-06-11 RX ADMIN — Medication 650 MILLIGRAM(S): at 22:20

## 2018-06-11 RX ADMIN — Medication 1 MILLIGRAM(S): at 11:49

## 2018-06-11 RX ADMIN — MIDODRINE HYDROCHLORIDE 10 MILLIGRAM(S): 2.5 TABLET ORAL at 17:22

## 2018-06-11 RX ADMIN — Medication 10 MILLIGRAM(S): at 22:20

## 2018-06-11 RX ADMIN — LEVETIRACETAM 500 MILLIGRAM(S): 250 TABLET, FILM COATED ORAL at 14:25

## 2018-06-11 RX ADMIN — PIPERACILLIN AND TAZOBACTAM 200 GRAM(S): 4; .5 INJECTION, POWDER, LYOPHILIZED, FOR SOLUTION INTRAVENOUS at 17:21

## 2018-06-11 RX ADMIN — MODAFINIL 100 MILLIGRAM(S): 200 TABLET ORAL at 11:49

## 2018-06-11 RX ADMIN — INSULIN HUMAN 13 UNIT(S): 100 INJECTION, SOLUTION SUBCUTANEOUS at 06:54

## 2018-06-11 RX ADMIN — INSULIN HUMAN 2: 100 INJECTION, SOLUTION SUBCUTANEOUS at 12:24

## 2018-06-11 NOTE — PROGRESS NOTE ADULT - PROBLEM SELECTOR PLAN 8
Likely 2/2 CKD, No bloody output. Prior studies consistent with Anemia of chronic disease. S/P 2 u pRBC with dialysis yesterday. Most likely 2/2 to debridement  -Stable H/H this AM  -Keep active T&S, Monitor H/H

## 2018-06-11 NOTE — PROGRESS NOTE ADULT - SUBJECTIVE AND OBJECTIVE BOX
INTERVAL HPI/OVERNIGHT EVENTS:    CONSTITUTIONAL:  Negative fever or chills, feels well, good appetite  EYES:  Negative  blurry vision or double vision  CARDIOVASCULAR:  Negative for chest pain or palpitations  RESPIRATORY:  Negative for cough, wheezing, or SOB   GASTROINTESTINAL:  Negative for nausea, vomiting, diarrhea, constipation, or abdominal pain  GENITOURINARY:  Negative frequency, urgency or dysuria  NEUROLOGIC:  No headache, confusion, dizziness, lightheadedness      ANTIBIOTICS/RELEVANT:    MEDICATIONS  (STANDING):  acetaminophen    Suspension. 650 milliGRAM(s) Oral every 8 hours  acetylcysteine 20% Inhalation 4 milliLiter(s) Inhalation two times a day  aspirin  chewable 81 milliGRAM(s) Oral daily  atorvastatin 80 milliGRAM(s) Oral at bedtime  calcitriol  Solution 0.25 MICROGram(s) Oral daily  collagenase Ointment 1 Application(s) Topical daily  Dakins Solution - 1/4 Strength 1 Application(s) Topical daily  dextrose 5%. 1000 milliLiter(s) (50 mL/Hr) IV Continuous <Continuous>  dextrose 50% Injectable 12.5 Gram(s) IV Push once  dextrose 50% Injectable 25 Gram(s) IV Push once  dextrose 50% Injectable 25 Gram(s) IV Push once  escitalopram 5 milliGRAM(s) Oral daily  fluconAZOLE IVPB 200 milliGRAM(s) IV Intermittent every 24 hours  folic acid 1 milliGRAM(s) Oral daily  hydrocortisone 10 milliGRAM(s) Oral every 12 hours  insulin regular  human corrective regimen sliding scale   SubCutaneous <User Schedule>  insulin regular  human recombinant 13 Unit(s) SubCutaneous every 6 hours  levETIRAcetam  Solution 500 milliGRAM(s) Oral every 24 hours  metoclopramide 5 milliGRAM(s) Oral every 12 hours  midodrine 10 milliGRAM(s) Oral every 8 hours  modafinil 100 milliGRAM(s) Oral daily  multivitamin 1 Tablet(s) Oral daily  piperacillin/tazobactam IVPB. 2.25 Gram(s) IV Intermittent every 8 hours    MEDICATIONS  (PRN):  dextrose Gel 1 Dose(s) Oral once PRN Blood Glucose LESS THAN 70 milliGRAM(s)/deciliter  glucagon  Injectable 1 milliGRAM(s) IntraMuscular once PRN Glucose LESS THAN 70 milligrams/deciliter        Vital Signs Last 24 Hrs  T(C): 36.7 (11 Jun 2018 08:31), Max: 37.3 (10 Gurmeet 2018 21:11)  T(F): 98.1 (11 Jun 2018 08:31), Max: 99.2 (10 Gurmeet 2018 21:11)  HR: 90 (11 Jun 2018 08:31) (90 - 102)  BP: 106/61 (11 Jun 2018 08:31) (93/66 - 107/76)  BP(mean): --  RR: 18 (11 Jun 2018 08:31) (17 - 20)  SpO2: 100% (11 Jun 2018 08:31) (98% - 100%)    PHYSICAL EXAM:  Constitutional:Well-developed, well nourished  Eyes:MAXI, EOMI  Ear/Nose/Throat: no oral lesion, no sinus tenderness on percussion	  Neck:no JVD, no lymphadenopathy, supple  Respiratory: CTA morris  Cardiovascular: S1S2 RRR, no murmurs  Gastrointestinal:soft, (+) BS, no HSM  Extremities:no e/e/c  Vascular: DP Pulse:	right normal; left normal      LABS:                        9.1    25.7  )-----------( 299      ( 10 Gurmeet 2018 07:01 )             29.9     06-11    136  |  95<L>  |  53<H>  ----------------------------<  260<H>  5.7<H>   |  23  |  3.34<H>    Ca    9.2      11 Jun 2018 06:57  Phos  4.0     06-10  Mg     2.2     06-11      PT/INR - ( 10 Gurmeet 2018 15:18 )   PT: 46.5 sec;   INR: 4.07                MICROBIOLOGY:    RADIOLOGY & ADDITIONAL STUDIES: INTERVAL HPI/OVERNIGHT EVENTS:  Patient seen and examined at bedside. patient unable to voice complaints.     ROS: unable to obtain    ANTIBIOTICS/RELEVANT:    MEDICATIONS  (STANDING):  acetaminophen    Suspension. 650 milliGRAM(s) Oral every 8 hours  acetylcysteine 20% Inhalation 4 milliLiter(s) Inhalation two times a day  aspirin  chewable 81 milliGRAM(s) Oral daily  atorvastatin 80 milliGRAM(s) Oral at bedtime  calcitriol  Solution 0.25 MICROGram(s) Oral daily  collagenase Ointment 1 Application(s) Topical daily  Dakins Solution - 1/4 Strength 1 Application(s) Topical daily  dextrose 5%. 1000 milliLiter(s) (50 mL/Hr) IV Continuous <Continuous>  dextrose 50% Injectable 12.5 Gram(s) IV Push once  dextrose 50% Injectable 25 Gram(s) IV Push once  dextrose 50% Injectable 25 Gram(s) IV Push once  escitalopram 5 milliGRAM(s) Oral daily  fluconAZOLE IVPB 200 milliGRAM(s) IV Intermittent every 24 hours  folic acid 1 milliGRAM(s) Oral daily  hydrocortisone 10 milliGRAM(s) Oral every 12 hours  insulin regular  human corrective regimen sliding scale   SubCutaneous <User Schedule>  insulin regular  human recombinant 13 Unit(s) SubCutaneous every 6 hours  levETIRAcetam  Solution 500 milliGRAM(s) Oral every 24 hours  metoclopramide 5 milliGRAM(s) Oral every 12 hours  midodrine 10 milliGRAM(s) Oral every 8 hours  modafinil 100 milliGRAM(s) Oral daily  multivitamin 1 Tablet(s) Oral daily  piperacillin/tazobactam IVPB. 2.25 Gram(s) IV Intermittent every 8 hours    MEDICATIONS  (PRN):  dextrose Gel 1 Dose(s) Oral once PRN Blood Glucose LESS THAN 70 milliGRAM(s)/deciliter  glucagon  Injectable 1 milliGRAM(s) IntraMuscular once PRN Glucose LESS THAN 70 milligrams/deciliter        Vital Signs Last 24 Hrs  T(C): 36.7 (11 Jun 2018 08:31), Max: 37.3 (10 Gurmeet 2018 21:11)  T(F): 98.1 (11 Jun 2018 08:31), Max: 99.2 (10 Gurmeet 2018 21:11)  HR: 90 (11 Jun 2018 08:31) (90 - 102)  BP: 106/61 (11 Jun 2018 08:31) (93/66 - 107/76)  BP(mean): --  RR: 18 (11 Jun 2018 08:31) (17 - 20)  SpO2: 100% (11 Jun 2018 08:31) (98% - 100%)    PHYSICAL EXAM:  Constitutional: chronically ill-appearing, currently on AC-VC;   Eyes:MAXI, EOMI  Ear/Nose/Throat: no oral lesion, no sinus tenderness on percussion	  Neck:no JVD, no lymphadenopathy, supple  Respiratory: decreased BS at bases secondary to poor effort   Cardiovascular: S1S2 RRR, no murmurs  Gastrointestinal: soft, (+) BS, no HSM  Extremities:no e/e/c; atrophy  Skin: deep stage 4 sacral decubitus ulcer to bone with some discharge and fecal residue (examined 6/7)      LABS:                        9.1    25.7  )-----------( 299      ( 10 Gurmeet 2018 07:01 )             29.9     06-11    136  |  95<L>  |  53<H>  ----------------------------<  260<H>  5.7<H>   |  23  |  3.34<H>    Ca    9.2      11 Jun 2018 06:57  Phos  4.0     06-10  Mg     2.2     06-11      PT/INR - ( 10 Gurmeet 2018 15:18 )   PT: 46.5 sec;   INR: 4.07                MICROBIOLOGY:    Culture - Fungal, Blood (06.09.18 @ 00:47)    Specimen Source: .Blood Blood    Culture Results:   Testing in progress    Culture - Blood (06.07.18 @ 19:59)    Specimen Source: .Blood 2 Sets Of Blood Cultures    Culture Results:   No growth at 3 days.            RADIOLOGY & ADDITIONAL STUDIES:      MR Pelvis Bony Only No Cont (06.08.18 @ 18:18) >    EXAM:  MR PELVIS BONY ONLY                          PROCEDURE DATE:  06/08/2018          INTERPRETATION:  MRI of the pelvis    Clinical indications: Sacral ulcer evaluate for osteomyelitis    Technique: Axial sagittal and coronal T1 and STIR imaging was performed.       Findings: As reported there is ulceration in the caudal soft tissues   overlying the lower sacrum. Gas is seen in close proximity to the   posterior elements of the sacrum in this location. On STIR imaging the   lowest most portion of the sacrum and the coccygeal segments are   diffusely hyperintense. On T1-weighted imaging marrow replacement is   noted only posteriorly, sparing the vertebral bodies. This is consistent   with osteomyelitis. Inferior to the ulceration is heterogeneous but   commonly hyperintense T2 signal in the subcutaneous tissues, which may   represent a collection,/phlegmon or abscess.     Of note there is diffuse anasarca and ascites. A rectal tube is present.   Rectal wall thickening is present. Thebladder not completely distended.   Abutting the cranial posterior aspect of the bladder is a loculated fluid   collection measuring 14 cm in craniocaudal dimension x 5 cm AP x 12 cm in   transverse dimension. This may represent loculated ascites. Anabscess   cannot be excluded.    Impression: Sacral ulceration with subtle T1 with placement of the   posterior elements of the lower sacrum and STIR signal hyperintensity   compatible with osteomyelitis.    Ascites and anasarca with loculated collection in the pelvis abutting the   bladder. CT scan with contrast is recommended for further   characterization.      A/P: 64 yo male with prolonged hospital course, C. 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 MRI showing osteomyelitis with a fluid collection abutting the bladder     - continue  fluconazole 200 mg IV q24h  - continue Zosyn 2.25g IV q8h  - trending  bcx-NTD   - pending [facility] discussion with family today     pendng discussion INTERVAL HPI/OVERNIGHT EVENTS:  Patient seen and examined at bedside. patient unable to voice complaints.     ROS: unable to obtain    ANTIBIOTICS/RELEVANT:    MEDICATIONS  (STANDING):  acetaminophen    Suspension. 650 milliGRAM(s) Oral every 8 hours  acetylcysteine 20% Inhalation 4 milliLiter(s) Inhalation two times a day  aspirin  chewable 81 milliGRAM(s) Oral daily  atorvastatin 80 milliGRAM(s) Oral at bedtime  calcitriol  Solution 0.25 MICROGram(s) Oral daily  collagenase Ointment 1 Application(s) Topical daily  Dakins Solution - 1/4 Strength 1 Application(s) Topical daily  dextrose 5%. 1000 milliLiter(s) (50 mL/Hr) IV Continuous <Continuous>  dextrose 50% Injectable 12.5 Gram(s) IV Push once  dextrose 50% Injectable 25 Gram(s) IV Push once  dextrose 50% Injectable 25 Gram(s) IV Push once  escitalopram 5 milliGRAM(s) Oral daily  fluconAZOLE IVPB 200 milliGRAM(s) IV Intermittent every 24 hours  folic acid 1 milliGRAM(s) Oral daily  hydrocortisone 10 milliGRAM(s) Oral every 12 hours  insulin regular  human corrective regimen sliding scale   SubCutaneous <User Schedule>  insulin regular  human recombinant 13 Unit(s) SubCutaneous every 6 hours  levETIRAcetam  Solution 500 milliGRAM(s) Oral every 24 hours  metoclopramide 5 milliGRAM(s) Oral every 12 hours  midodrine 10 milliGRAM(s) Oral every 8 hours  modafinil 100 milliGRAM(s) Oral daily  multivitamin 1 Tablet(s) Oral daily  piperacillin/tazobactam IVPB. 2.25 Gram(s) IV Intermittent every 8 hours    MEDICATIONS  (PRN):  dextrose Gel 1 Dose(s) Oral once PRN Blood Glucose LESS THAN 70 milliGRAM(s)/deciliter  glucagon  Injectable 1 milliGRAM(s) IntraMuscular once PRN Glucose LESS THAN 70 milligrams/deciliter        Vital Signs Last 24 Hrs  T(C): 36.7 (11 Jun 2018 08:31), Max: 37.3 (10 Gurmeet 2018 21:11)  T(F): 98.1 (11 Jun 2018 08:31), Max: 99.2 (10 Gurmeet 2018 21:11)  HR: 90 (11 Jun 2018 08:31) (90 - 102)  BP: 106/61 (11 Jun 2018 08:31) (93/66 - 107/76)  BP(mean): --  RR: 18 (11 Jun 2018 08:31) (17 - 20)  SpO2: 100% (11 Jun 2018 08:31) (98% - 100%)    PHYSICAL EXAM:  Constitutional: chronically ill-appearing, currently on AC-VC;   Eyes:MAXI, EOMI  Ear/Nose/Throat: no oral lesion, no sinus tenderness on percussion	  Neck:no JVD, no lymphadenopathy, supple  Respiratory: decreased BS at bases secondary to poor effort   Cardiovascular: S1S2 RRR, no murmurs  Gastrointestinal: soft, (+) BS, no HSM  Extremities:no e/e/c; atrophy  Skin: deep stage 4 sacral decubitus ulcer to bone with some discharge and fecal residue (examined 6/7)      LABS:                        9.1    25.7  )-----------( 299      ( 10 Gurmeet 2018 07:01 )             29.9     06-11    136  |  95<L>  |  53<H>  ----------------------------<  260<H>  5.7<H>   |  23  |  3.34<H>    Ca    9.2      11 Jun 2018 06:57  Phos  4.0     06-10  Mg     2.2     06-11      PT/INR - ( 10 Gurmeet 2018 15:18 )   PT: 46.5 sec;   INR: 4.07                MICROBIOLOGY:    Culture - Fungal, Blood (06.09.18 @ 00:47)    Specimen Source: .Blood Blood    Culture Results:   Testing in progress    Culture - Blood (06.07.18 @ 19:59)    Specimen Source: .Blood 2 Sets Of Blood Cultures    Culture Results:   No growth at 3 days.            RADIOLOGY & ADDITIONAL STUDIES:      MR Pelvis Bony Only No Cont (06.08.18 @ 18:18) >    EXAM:  MR PELVIS BONY ONLY                          PROCEDURE DATE:  06/08/2018          INTERPRETATION:  MRI of the pelvis    Clinical indications: Sacral ulcer evaluate for osteomyelitis    Technique: Axial sagittal and coronal T1 and STIR imaging was performed.       Findings: As reported there is ulceration in the caudal soft tissues   overlying the lower sacrum. Gas is seen in close proximity to the   posterior elements of the sacrum in this location. On STIR imaging the   lowest most portion of the sacrum and the coccygeal segments are   diffusely hyperintense. On T1-weighted imaging marrow replacement is   noted only posteriorly, sparing the vertebral bodies. This is consistent   with osteomyelitis. Inferior to the ulceration is heterogeneous but   commonly hyperintense T2 signal in the subcutaneous tissues, which may   represent a collection,/phlegmon or abscess.     Of note there is diffuse anasarca and ascites. A rectal tube is present.   Rectal wall thickening is present. Thebladder not completely distended.   Abutting the cranial posterior aspect of the bladder is a loculated fluid   collection measuring 14 cm in craniocaudal dimension x 5 cm AP x 12 cm in   transverse dimension. This may represent loculated ascites. Anabscess   cannot be excluded.    Impression: Sacral ulceration with subtle T1 with placement of the   posterior elements of the lower sacrum and STIR signal hyperintensity   compatible with osteomyelitis.    Ascites and anasarca with loculated collection in the pelvis abutting the   bladder. CT scan with contrast is recommended for further   characterization.      A/P: 64 yo male with prolonged hospital course, C. 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 MRI showing osteomyelitis with a fluid collection abutting the bladder     - continue  fluconazole 200 mg IV q24h  - continue Zosyn 2.25g IV q8h  - trending  bcx-NTD   - pending C discussion today

## 2018-06-11 NOTE — PROGRESS NOTE ADULT - PROBLEM SELECTOR PLAN 4
Treated for both septic as well as cardiogenic shock requiring pressors and inotrope. Subsequently weaned off, now on midodrine 15 mg TID. Has intermittently used albumin to tolerate dialysis. BP 80/60s.   - c/w Midodrine 10 mg q8h + add 10 midodrine pre-HD  - hydrocortisone 10mg BID (tapered to this dose on 5/29)  - tolerated HD w/o albumin, f/u renal recs    # r/o adrenal Insufficiency  BP have been low with SBP in , with MAP> 60.  - Attempts were being made to taper steroids

## 2018-06-11 NOTE — PROGRESS NOTE ADULT - PROBLEM SELECTOR PLAN 4
multiple potential sites of pain (tubes, drain, wounds, artifical devices, prolonged immobility)  Pt had previously had hydromorphone 0.25mg iv q4h prn available. would consider same now.  Pt denying the presence of pain now, but pt remains at high risk for pain at any time due to extremely frail and fragile condition.

## 2018-06-11 NOTE — PROGRESS NOTE ADULT - PROBLEM SELECTOR PLAN 10
VTE: Coumadin dosed nightly, with INR daily checks, goal 2-3  GI PPx: PPI  DNR- Made DNR, family wants escalation of care, pressors if needed.  F: No IVF in setting of HD  E: Replete electrolytes with caution in setting of HD  N: Jevity 1.5 goal rate 62cc/hr, per updated nutrition recs 5/21  Dispo: Plan was to LTAC with dialysis - re-discus with family

## 2018-06-11 NOTE — PROGRESS NOTE ADULT - PROBLEM SELECTOR PLAN 3
on broad spectrum abx.    ID will help clarify the role for next level abx, although options and the possibility of successfully curing the pts would seem unlikely.

## 2018-06-11 NOTE — CONSULT NOTE ADULT - PROVIDER SPECIALTY LIST ADULT
Cardiology
Critical Care
ENT
Ethics
Gastroenterology
Heart Failure
Infectious Disease
Intervent Radiology
Nephrology
Neurology
Neurology
Orthopedics
Orthopedics
Palliative Care
Rehab Medicine
Vascular Surgery
Plastic Surgery
Infectious Disease
Vascular Surgery

## 2018-06-11 NOTE — PROGRESS NOTE ADULT - PROBLEM SELECTOR PLAN 5
Patient noted to have persistent leukocytosis. Source of infection thought to be due to sacral ulcer leading to osteomyelitis. ID on board. Will attend goals of care meeting today.

## 2018-06-11 NOTE — PROGRESS NOTE ADULT - ATTENDING COMMENTS
Pt. seen and examined by me at 12PM; case d/w ID attending; I have read Dr. Jaffe's note, I agree w/ her findings and plan of care as documented; plan for multidisciplinary meeting later today

## 2018-06-11 NOTE — PROGRESS NOTE ADULT - ATTENDING COMMENTS
MRI pelvis revealing large loculated collection posterior to bladder. Interdisciplinary meeting held today with ethics, palliative care, renal, hospital medicine, SW, and CM. The sacral osteomyelitis is unlikely to improve (even with broad antibiotics) given patient's inability to reposition himself and regular fecal contamination of wound. He is not a surgical candidate as deemed by plastics. Prolonged antibiotics are associated with risk of MDR infection, C. difficile diarrhea. This is to be discussed at family meeting with patient's HCP next week. For now, continuing Zosyn and fluconazole.

## 2018-06-11 NOTE — PROGRESS NOTE ADULT - SUBJECTIVE AND OBJECTIVE BOX
INTERVAL HPI/OVERNIGHT EVENTS:      On further ROS, patient did not have complaint of: Headache, Blurred Vision, Cough, Dyspnea, Palpitations, Abdominal Pain, N/V, Weakness, Change in Sensation.       VITAL SIGNS:  T(F): 98.1 (06-11-18 @ 08:31)  HR: 90 (06-11-18 @ 08:31)  BP: 106/61 (06-11-18 @ 08:31)  RR: 18 (06-11-18 @ 08:31)  SpO2: 100% (06-11-18 @ 08:31)  Wt(kg): --      PHYSICAL EXAM:  Constitutional: Patient seated comfortably in bed, of appropriate color, nutrition, and hydration.   HEENT: PERRLA, Normal Range of eye movement with no complaint of diplopia, Normal Hearing  Neck: No LAD, No JVD  Back: Normal spine flexure, No CVA tenderness  Respiratory: Normal air entry, Lungs clear to auscultation b/l w/o wheeze or crepitations.   Cardiovascular: S1 and S2 present - no additional abnormal sounds or murmurs. Normal rhythm and rate of pulse.   Gastrointestinal: BS+, soft, NT/ND  Extremities: No peripheral edema  Vascular: 2+ peripheral pulses  Neurological: A/O x 3, CN V-XII grossly intact.  Psychiatric: Normal mood, normal affect  Musculoskeletal: 5/5 strength b/l upper and lower extremities  Skin: No rashes      MEDICATIONS  (STANDING):  acetaminophen    Suspension. 650 milliGRAM(s) Oral every 8 hours  acetylcysteine 20% Inhalation 4 milliLiter(s) Inhalation two times a day  aspirin  chewable 81 milliGRAM(s) Oral daily  atorvastatin 80 milliGRAM(s) Oral at bedtime  calcitriol  Solution 0.25 MICROGram(s) Oral daily  collagenase Ointment 1 Application(s) Topical daily  Dakins Solution - 1/4 Strength 1 Application(s) Topical daily  dextrose 5%. 1000 milliLiter(s) (50 mL/Hr) IV Continuous <Continuous>  dextrose 50% Injectable 12.5 Gram(s) IV Push once  dextrose 50% Injectable 25 Gram(s) IV Push once  dextrose 50% Injectable 25 Gram(s) IV Push once  escitalopram 5 milliGRAM(s) Oral daily  fluconAZOLE IVPB 200 milliGRAM(s) IV Intermittent every 24 hours  folic acid 1 milliGRAM(s) Oral daily  hydrocortisone 10 milliGRAM(s) Oral every 12 hours  insulin regular  human corrective regimen sliding scale   SubCutaneous <User Schedule>  insulin regular  human recombinant 13 Unit(s) SubCutaneous every 6 hours  levETIRAcetam  Solution 500 milliGRAM(s) Oral every 24 hours  metoclopramide 5 milliGRAM(s) Oral every 12 hours  midodrine 10 milliGRAM(s) Oral every 8 hours  modafinil 100 milliGRAM(s) Oral daily  multivitamin 1 Tablet(s) Oral daily  piperacillin/tazobactam IVPB. 2.25 Gram(s) IV Intermittent every 8 hours    MEDICATIONS  (PRN):  dextrose Gel 1 Dose(s) Oral once PRN Blood Glucose LESS THAN 70 milliGRAM(s)/deciliter  glucagon  Injectable 1 milliGRAM(s) IntraMuscular once PRN Glucose LESS THAN 70 milligrams/deciliter      Allergies  No Known Allergies  Intolerances      LABS:                        9.1    25.7  )-----------( 299      ( 10 Gurmeet 2018 07:01 )             29.9     06-11    136  |  95<L>  |  53<H>  ----------------------------<  260<H>  5.7<H>   |  23  |  3.34<H>    Ca    9.2      11 Jun 2018 06:57  Phos  4.0     06-10  Mg     2.2     06-11      PT/INR - ( 10 Gurmeet 2018 15:18 )   PT: 46.5 sec;   INR: 4.07            RADIOLOGY & ADDITIONAL TESTS: INTERVAL HPI/OVERNIGHT EVENTS:  Over the weekend, Hgb dropped to 6.2 and patient was transfused 2u prbc - bleeding is likely due to sacral ulcer - nursing has had to change dressing several times. Otherwise, MRI read today - c/w acute OM, a loculated collection is also noted by the bladder - to f/u with ID regarding need for CT and subsequent drainage.     Patient did not spike fevers over the weekend. At bedside, he appeared diaphoretic - was afebrile, F/S wnlm just prior - he opened his eyes and shook his head to questions about pain and discomfort with breathing.     ROS difficult to obtain fully.       VITAL SIGNS:  T(F): 98.1 (06-11-18 @ 08:31)  HR: 90 (06-11-18 @ 08:31)  BP: 106/61 (06-11-18 @ 08:31)  RR: 18 (06-11-18 @ 08:31)  SpO2: 100% (06-11-18 @ 08:31)  Wt(kg): --      PHYSICAL EXAM:  Constitutional: Thin male w/ tracheostomy, ventilator dependent. Not in acute distress.   HEENT: PERRLA, Normal Hearing  Neck: No LAD, No JVD  Back: Normal spine flexure, No CVA tenderness  Respiratory: Normal air entry, Lungs clear to auscultation b/l w/o wheeze or crepitations.   Cardiovascular: S1 and S2 present - no additional abnormal sounds or murmurs. Normal rhythm and rate of pulse. AICD in place.   Gastrointestinal: BS+, soft, NT/ND  Extremities: No peripheral edema - discolored nails.   Vascular: 2+ peripheral pulses  Neurological: Nods to questions, and when asked if he knows where he is. No observed cranial nerve deficit.   Musculoskeletal: 5/5 strength b/l upper and lower extremities  Skin: Unstageable sacral ulcer - dressing overlying, stained with blood. When dressing removed, it is an open, crater-like ulceration.       MEDICATIONS  (STANDING):  acetaminophen    Suspension. 650 milliGRAM(s) Oral every 8 hours  acetylcysteine 20% Inhalation 4 milliLiter(s) Inhalation two times a day  aspirin  chewable 81 milliGRAM(s) Oral daily  atorvastatin 80 milliGRAM(s) Oral at bedtime  calcitriol  Solution 0.25 MICROGram(s) Oral daily  collagenase Ointment 1 Application(s) Topical daily  Dakins Solution - 1/4 Strength 1 Application(s) Topical daily  dextrose 5%. 1000 milliLiter(s) (50 mL/Hr) IV Continuous <Continuous>  dextrose 50% Injectable 12.5 Gram(s) IV Push once  dextrose 50% Injectable 25 Gram(s) IV Push once  dextrose 50% Injectable 25 Gram(s) IV Push once  escitalopram 5 milliGRAM(s) Oral daily  fluconAZOLE IVPB 200 milliGRAM(s) IV Intermittent every 24 hours  folic acid 1 milliGRAM(s) Oral daily  hydrocortisone 10 milliGRAM(s) Oral every 12 hours  insulin regular  human corrective regimen sliding scale   SubCutaneous <User Schedule>  insulin regular  human recombinant 13 Unit(s) SubCutaneous every 6 hours  levETIRAcetam  Solution 500 milliGRAM(s) Oral every 24 hours  metoclopramide 5 milliGRAM(s) Oral every 12 hours  midodrine 10 milliGRAM(s) Oral every 8 hours  modafinil 100 milliGRAM(s) Oral daily  multivitamin 1 Tablet(s) Oral daily  piperacillin/tazobactam IVPB. 2.25 Gram(s) IV Intermittent every 8 hours    MEDICATIONS  (PRN):  dextrose Gel 1 Dose(s) Oral once PRN Blood Glucose LESS THAN 70 milliGRAM(s)/deciliter  glucagon  Injectable 1 milliGRAM(s) IntraMuscular once PRN Glucose LESS THAN 70 milligrams/deciliter      Allergies  No Known Allergies  Intolerances      LABS:                        9.1    25.7  )-----------( 299      ( 10 Gurmeet 2018 07:01 )             29.9     06-11    136  |  95<L>  |  53<H>  ----------------------------<  260<H>  5.7<H>   |  23  |  3.34<H>    Ca    9.2      11 Jun 2018 06:57  Phos  4.0     06-10  Mg     2.2     06-11      PT/INR - ( 10 Gurmeet 2018 15:18 )   PT: 46.5 sec;   INR: 4.07            RADIOLOGY & ADDITIONAL TESTS:  < from: MR Pelvis Bony Only No Cont (06.08.18 @ 18:18) >  Impression: Sacral ulceration with subtle T1 with placement of the   posterior elements of the lower sacrum and STIR signal hyperintensity   compatible with osteomyelitis.    Ascites and anasarca with loculated collection in the pelvis abutting the   bladder. CT scan with contrast is recommended for further   characterization.

## 2018-06-11 NOTE — PROGRESS NOTE ADULT - PROBLEM SELECTOR PLAN 2
Stage IV with evidence of OM on imaging. No role for surgical debridement due to the need for weeks of prolonged position in order to allow for recovery.   Pt on broad spectrum abx at this time for OM.

## 2018-06-11 NOTE — CONSULT NOTE ADULT - CONSULT REQUESTED BY NAME
Carlos Crawford
Danis
Dr Benjamin
Dr Moscoso
Dr. Mccollum
Dr. Moscoso
Dr. galan
House staff / OT
MICU
MICU
MICU team
Medicine
Medicine team
Patient's daughter via Lolis Sorensen NP (palliative)
Primary team
mamadou
Dr. Benjamin
Dr. Brooks
ICU

## 2018-06-11 NOTE — PROGRESS NOTE ADULT - PROBLEM SELECTOR PLAN 1
Patient is a 63 year old male with acute renal failure from ischemic ATN now requiring hemodialysis. Patient was last dialyzed on 6/9 with UF of 2.5L     P - Patient does not require emergent dialysis as volume status is acceptable   Anticipate next dialysis on 6/12  Patient noted to have a K of 5.7. Please give kayexelate 15 gm x 1

## 2018-06-11 NOTE — CONSULT NOTE ADULT - CONSULT REASON
GILMER
AMS
AMS
AVF creation
CHF
CHF
Candidemia
Complex goals of care dilemma
Dysphagia
Evaluation for splint, upper ext.
Right forearm eschar r/o abscess
Support and planning in the midst of serious illness.
Tracheotomy
evaluation for midline catheter placement
left upper extremity eschar
severe sepsis 2/2 cellulitis
sacral wound
Bilateral leg swelling, in concern of cellulitis/necrotizing fascitis.
new fevers, left arm abscess

## 2018-06-11 NOTE — PROGRESS NOTE ADULT - SUBJECTIVE AND OBJECTIVE BOX
Palliative Medicine asked to reconsult on this 63 year old male who has been hospitalized over 3 months.  Pt was admitted with cellulitis in 2018.  His course was complicated by septic shock, cardiogenic shock, renal failure, respiratory failure, multiple infections and cva with seizure activity.  Pt remains mechanically vented, hemodialysis dependant, completely disabled requiring complete care for ADLs.  He is fed enterally through a PEG tube. Pt has developed significant decubitus ulcer which is concerning for osteomyelitis (work up in progress). As pt is fecally incontinent, he continues to contaminate the wound with fecal matter.    Unclear at this point, what is achievable in regard to control of infection from this ulcer as well as steps involved to remediate it and the ongoing use of antibiotics to treat same.  Additionally, pt remains profoundly debilitated with impaired quality of life.    Interval history:      	                   PAIN (0-10):  0/10  DYSPNEA (Y/N):  no  NAUSEA/VOMITING (Y/N):  no  SECRETIONS (Y/N):  no  AGITATION (Y/N): yes mildly    OTHER REVIEW OF SYSTEMS:  UNABLE TO OBTAIN  due to:  altered mental status    Vital Signs Last 24 Hrs  T(C): 37.2 (2018 15:30), Max: 37.3 (10 Gurmeet 2018 21:11)  T(F): 98.9 (2018 15:30), Max: 99.2 (10 Gurmeet 2018 21:11)  HR: 97 (2018 15:30) (90 - 102)  BP: 117/61 (2018 15:30) (93/66 - 117/61)  BP(mean): --  RR: 18 (2018 15:30) (17 - 20)  SpO2: 98% (2018 15:30) (98% - 100%)  Wt(kg): --    Daily     Daily Weight in k (2018 05:06)  (pt weight in 2018 is documented at 74 kgs - altho pt was fluid overloaded)    GENERAL:  arousable   HEENT:  NC/AT, normocephalic, sclera anicteric, temporal wasting  NECK:    supple, no JVD, + tracheostomy  CVS:     reg, + left chest wall PPM  RESP:  vented, course, sonorous breath sounds  GI:    + PEG tube, flat, nontender, +BS  :    aneuric, on HD  Rectal:  rectal tube in place  MUSC:     no movement to Bilat LE, moving bilat UE against gravity  NEURO:     arousable, lethargic,   PSYCH:    maritza       SKIN:         + sacral decub (not directly visualized by me)  LYMPH:      neg  	                   OPIATE NAÏVE (Y/N):  yes  ALLERGIES: No Known Allergies    MEDICATIONS  (STANDING):  acetaminophen    Suspension. 650 milliGRAM(s) Oral every 8 hours  acetylcysteine 20% Inhalation 4 milliLiter(s) Inhalation two times a day  aspirin  chewable 81 milliGRAM(s) Oral daily  atorvastatin 80 milliGRAM(s) Oral at bedtime  calcitriol  Solution 0.25 MICROGram(s) Oral daily  collagenase Ointment 1 Application(s) Topical daily  Dakins Solution - 1/4 Strength 1 Application(s) Topical daily  dextrose 5%. 1000 milliLiter(s) (50 mL/Hr) IV Continuous <Continuous>  dextrose 50% Injectable 12.5 Gram(s) IV Push once  dextrose 50% Injectable 25 Gram(s) IV Push once  dextrose 50% Injectable 25 Gram(s) IV Push once  escitalopram 5 milliGRAM(s) Oral daily  fluconAZOLE IVPB 200 milliGRAM(s) IV Intermittent every 24 hours  folic acid 1 milliGRAM(s) Oral daily  hydrocortisone 10 milliGRAM(s) Oral every 12 hours  insulin regular  human corrective regimen sliding scale   SubCutaneous <User Schedule>  insulin regular  human recombinant 13 Unit(s) SubCutaneous every 6 hours  levETIRAcetam  Solution 500 milliGRAM(s) Oral every 24 hours  metoclopramide 5 milliGRAM(s) Oral every 12 hours  midodrine 10 milliGRAM(s) Oral every 8 hours  modafinil 100 milliGRAM(s) Oral daily  multivitamin 1 Tablet(s) Oral daily  piperacillin/tazobactam IVPB. 2.25 Gram(s) IV Intermittent every 8 hours    MEDICATIONS  (PRN):  dextrose Gel 1 Dose(s) Oral once PRN Blood Glucose LESS THAN 70 milliGRAM(s)/deciliter  glucagon  Injectable 1 milliGRAM(s) IntraMuscular once PRN Glucose LESS THAN 70 milligrams/deciliter    	                   LABS REVIEWED                                     9.1    25.7  )-----------( 299      ( 10 Gurmeet 2018 07:01 )             29.9       06-11    136  |  95<L>  |  53<H>  ----------------------------<  260<H>  5.7<H>   |  23  |  3.34<H>    Ca    9.2      2018 06:57  Phos  4.0     06-10  Mg     2.2     06-11                          IMAGING REVIEWED:    no new imaging  	  ADVANCED DIRECTIVES:    DNR     PSYCHOSOCIAL-SPIRITUAL ASSESSMENT:              Care plan unchanged          GOALS OF CARE DISCUSSION:       Palliative care info/counseling provided	           Family meeting-- tenatively for Friday [address] 15       Advanced Directives; DNR as of May 2018       See previous Palliative Medicine Note  	        REFERRALS:        Palliative Med        Unit SW/Case Mgmt              Patient/Family Support              Nutrition       Ethics       PT/OT

## 2018-06-11 NOTE — PROGRESS NOTE ADULT - PROBLEM SELECTOR PLAN 5
Intermittently not following commands.  Noted to have change in mental status during admission, with concerns of encephalopathy likely multifactorial in setting of septic and cardiogenic shock as well as c/b brain stem infarct.   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 Per neurology may have had ischemic event from hypoperfusion. Also showing disc protrusion at C3/C4 level coursing superiorly to the C2/C3 contacting the ventral spinal cord.   - c/w Modafinil  - c/w Keppra 500 mg qd with extra 250 mg post HD days for seizures

## 2018-06-11 NOTE — CONSULT NOTE ADULT - SUBJECTIVE AND OBJECTIVE BOX
THIS IS A PRELIMINARY ETHICS NOTE.  FULL NOTE TO FOLLOW.    Mr. Montana is a 63 year old gentleman in the hospital continuously since March, who has endured a complex medical course (see provider notes for detail).  At this time, per providers, he is not mentating well enough to participate in decision making for his own care.  Primary team and consultants are attempting to determine goals of care that are medically achievable and will provide the patient benefit.      ETHICS PROCESS TO DATE:  I have observed the patient and met today with a multidisciplinary team of clinical providers, as well as case management and social work, to discuss the patient's condition, prognosis and options.      ETHICS FINDINGS TO DATE:  Patient lacks capacity to make his own healthcare decisions.  His daughter is the HCP.  Patient's ex-wife (mother of the HCP) is a nurse and is involved to some extent.  It is unclear exactly what has been presented to the family, and it is unclear what their expectations and concerns may be.      ETHICS PLAN:  Plan is for team to arrange a meeting with core providers and patient's family, at minimum the daughter(HCP) and her mother who seems to be influencing care decisions.  Meeting to be planned for Monday or Tuesday when daughter is known to be available.  Patient's daughter requested input from ethics.  Since I have never met her, will reach out by phone prior (Friday since daughter is working in court and unavailable prior).

## 2018-06-11 NOTE — PROGRESS NOTE ADULT - SUBJECTIVE AND OBJECTIVE BOX
Patient seen and examined at bedside. Patient is a 63 year old male with a history of HFrEF 10-15% due to ischemic cardiomyopathy, s/p AICD, ?atrial fibrillation, hypertension, diabetes mellitus type 2, gout, and CKD for whom nephrology was called for GILMER from ATN requiring hemodialysis. Patient is comfortable and in no distress. Patient was last dialyzed 6/9 with UF of 2.5L     acetaminophen    Suspension. 650 milliGRAM(s) every 8 hours  acetylcysteine 20% Inhalation 4 milliLiter(s) two times a day  aspirin  chewable 81 milliGRAM(s) daily  atorvastatin 80 milliGRAM(s) at bedtime  calcitriol  Solution 0.25 MICROGram(s) daily  collagenase Ointment 1 Application(s) daily  Dakins Solution - 1/4 Strength 1 Application(s) daily  dextrose 5%. 1000 milliLiter(s) <Continuous>  dextrose 50% Injectable 12.5 Gram(s) once  dextrose 50% Injectable 25 Gram(s) once  dextrose 50% Injectable 25 Gram(s) once  dextrose Gel 1 Dose(s) once PRN  escitalopram 5 milliGRAM(s) daily  fluconAZOLE IVPB 200 milliGRAM(s) every 24 hours  folic acid 1 milliGRAM(s) daily  glucagon  Injectable 1 milliGRAM(s) once PRN  hydrocortisone 10 milliGRAM(s) every 12 hours  insulin regular  human corrective regimen sliding scale   <User Schedule>  insulin regular  human recombinant 13 Unit(s) every 6 hours  levETIRAcetam  Solution 500 milliGRAM(s) every 24 hours  metoclopramide 5 milliGRAM(s) every 12 hours  midodrine 10 milliGRAM(s) every 8 hours  modafinil 100 milliGRAM(s) daily  multivitamin 1 Tablet(s) daily  piperacillin/tazobactam IVPB. 2.25 Gram(s) every 8 hours    Allergies    No Known Allergies    Intolerances    T(C): , Max: 37.3 (06-10-18 @ 21:11)  T(F): , Max: 99.2 (06-10-18 @ 21:11)  HR: 90 (06-11-18 @ 08:31)  BP: 106/61 (06-11-18 @ 08:31)  RR: 18 (06-11-18 @ 08:31)  SpO2: 100% (06-11-18 @ 08:31)    LABS:                        9.1    25.7  )-----------( 299      ( 10 Gurmeet 2018 07:01 )             29.9     06-11    136  |  95<L>  |  53<H>  ----------------------------<  260<H>  5.7<H>   |  23  |  3.34<H>    Ca    9.2      11 Jun 2018 06:57  Phos  4.0     06-10  Mg     2.2     06-11    PT/INR - ( 10 Gurmeet 2018 15:18 )   PT: 46.5 sec;   INR: 4.07

## 2018-06-11 NOTE — PROGRESS NOTE ADULT - PROBLEM SELECTOR PLAN 6
Failure likely 2/2 to ischemic ATN with hypoperfusion in setting of shock. Baseline unknown but presenting Cr 3.93 with associated metabolic derangements. Started on HD during course with renal following. Permacath replaced multiple times over course for bacteremia and fungemia. Has R sided Permacath.     Patient to be considered for AV fistula pending medical optimization for surgical procedure. Patient is currently not medically optimized and requires continued rehabilitation prior to surgical consideration.

## 2018-06-11 NOTE — PROGRESS NOTE ADULT - PROBLEM SELECTOR PLAN 8
Support provided to patient and family. Patient to have access to supportive services during rest of hospital stay as the patient/family deemed necessary ie. Massage therapy, Music therapy, Patient and family supportive services, etc.    Multidisciplinary team meeting today to  discuss plan of care, options of care and prognosis.

## 2018-06-11 NOTE — PROGRESS NOTE ADULT - ASSESSMENT
63M PMH HFrEF 10-15% (ischemic), MI, s/p AICD vs PPM, possible Afib, HTN, DM2 on insulin, possible CKD, and gout, who presented with a chief complaint of generalized weakness (3/10/2018) s/p septic shock likely 2/2 LE cellulitis complicated by ATN requiring dialysis. Course complicated by AMS likely from brainstem infarct 2/2 LV thrombus vs toxic metabolic encephalopathy. Further complicated by development of candidemia and sepsis 2/2 klebsiella bacteremia s/p fluconazole and CTX, now resolved. s/p completed course of Zosyn for aspiration PNA.  Goals of care discussion for which patient made DNR, but with continued escalation of care. Continues to undergo management for respiratory failure with weaning as tolerated with CPAP, trach collar trials. With complications of hypotension, tolerating HD w/o albumin. Stable and ready for SD to [facility] for continued vent weaning prior to d/c to long term care facility with HD+vent weaning.    Pt remains hospitalized.  He is vent dependent and on HD.  Pt now had OM to his sacrum with a significant decubitus ulcer that is not amenable to surgical debridement.    Palliative Medicine has been asked to re consult on Mr Montana's case due to the complex nature of his illness and his current limited prognosis due to the constellation of his medical problems.

## 2018-06-11 NOTE — PROGRESS NOTE ADULT - PROBLEM SELECTOR PLAN 1
- Patient treated for Sacral Decub growing Pseudomonas for 7 days with IV Zosyn - since stopping, persistently elevating leukocytosis  - Last fevers 100.3 F (6/5) and 102.5 F (6/7)  - On skin exam - new purulence in ulcer (since debrided) - Pelvic MRI confirms acute osteomyelitis and a loculated collection abutting the bladder - f/u CT recommended, questionable need for drainage.   - Repeat Blood Cx, NGTD - Fungal Blood Cx sent   - UCx: Candida Dubliniensis   - C/w Fluconazole, 800mg loading dose, 200mg thereafter.     #Fungemia  Noted to have Candida Tropicalis growing in blood cultures and completed fluconazole course.

## 2018-06-11 NOTE — PROGRESS NOTE ADULT - PROBLEM SELECTOR PLAN 6
Failure likely 2/2 to ischemic ATN with hypoperfusion in setting of shock. Baseline unknown but presenting Cr 3.93 with associated metabolic derangements. Started on HD during course with renal following. Permacath replaced multiple times over course for bacteremia and fungemia. Has R sided Permacath.

## 2018-06-12 LAB
ANION GAP SERPL CALC-SCNC: 20 MMOL/L — HIGH (ref 5–17)
BUN SERPL-MCNC: 70 MG/DL — HIGH (ref 7–23)
CALCIUM SERPL-MCNC: 9.3 MG/DL — SIGNIFICANT CHANGE UP (ref 8.4–10.5)
CHLORIDE SERPL-SCNC: 94 MMOL/L — LOW (ref 96–108)
CO2 SERPL-SCNC: 26 MMOL/L — SIGNIFICANT CHANGE UP (ref 22–31)
CREAT SERPL-MCNC: 4.34 MG/DL — HIGH (ref 0.5–1.3)
CULTURE RESULTS: SIGNIFICANT CHANGE UP
GLUCOSE BLDC GLUCOMTR-MCNC: 146 MG/DL — HIGH (ref 70–99)
GLUCOSE BLDC GLUCOMTR-MCNC: 174 MG/DL — HIGH (ref 70–99)
GLUCOSE BLDC GLUCOMTR-MCNC: 184 MG/DL — HIGH (ref 70–99)
GLUCOSE BLDC GLUCOMTR-MCNC: 210 MG/DL — HIGH (ref 70–99)
GLUCOSE BLDC GLUCOMTR-MCNC: 241 MG/DL — HIGH (ref 70–99)
GLUCOSE SERPL-MCNC: 203 MG/DL — HIGH (ref 70–99)
HCT VFR BLD CALC: 29.7 % — LOW (ref 39–50)
HGB BLD-MCNC: 9 G/DL — LOW (ref 13–17)
INR BLD: 3.6 — HIGH (ref 0.88–1.16)
MCHC RBC-ENTMCNC: 26 PG — LOW (ref 27–34)
MCHC RBC-ENTMCNC: 30.3 G/DL — LOW (ref 32–36)
MCV RBC AUTO: 85.8 FL — SIGNIFICANT CHANGE UP (ref 80–100)
PLATELET # BLD AUTO: 281 K/UL — SIGNIFICANT CHANGE UP (ref 150–400)
POTASSIUM SERPL-MCNC: 5.1 MMOL/L — SIGNIFICANT CHANGE UP (ref 3.5–5.3)
POTASSIUM SERPL-SCNC: 5.1 MMOL/L — SIGNIFICANT CHANGE UP (ref 3.5–5.3)
PROTHROM AB SERPL-ACNC: 41 SEC — HIGH (ref 9.8–12.7)
RBC # BLD: 3.46 M/UL — LOW (ref 4.2–5.8)
RBC # FLD: 18.7 % — HIGH (ref 10.3–16.9)
SODIUM SERPL-SCNC: 140 MMOL/L — SIGNIFICANT CHANGE UP (ref 135–145)
SPECIMEN SOURCE: SIGNIFICANT CHANGE UP
WBC # BLD: 25 K/UL — HIGH (ref 3.8–10.5)
WBC # FLD AUTO: 25 K/UL — HIGH (ref 3.8–10.5)

## 2018-06-12 PROCEDURE — 99232 SBSQ HOSP IP/OBS MODERATE 35: CPT

## 2018-06-12 PROCEDURE — 90937 HEMODIALYSIS REPEATED EVAL: CPT | Mod: GC

## 2018-06-12 RX ORDER — ERYTHROPOIETIN 10000 [IU]/ML
10000 INJECTION, SOLUTION INTRAVENOUS; SUBCUTANEOUS ONCE
Qty: 0 | Refills: 0 | Status: COMPLETED | OUTPATIENT
Start: 2018-06-12 | End: 2018-06-12

## 2018-06-12 RX ORDER — DOXERCALCIFEROL 2.5 UG/1
2 CAPSULE ORAL ONCE
Qty: 0 | Refills: 0 | Status: COMPLETED | OUTPATIENT
Start: 2018-06-12 | End: 2018-06-12

## 2018-06-12 RX ORDER — MODAFINIL 200 MG/1
100 TABLET ORAL DAILY
Qty: 0 | Refills: 0 | Status: DISCONTINUED | OUTPATIENT
Start: 2018-06-12 | End: 2018-06-18

## 2018-06-12 RX ADMIN — Medication 1 APPLICATION(S): at 12:47

## 2018-06-12 RX ADMIN — ESCITALOPRAM OXALATE 5 MILLIGRAM(S): 10 TABLET, FILM COATED ORAL at 12:46

## 2018-06-12 RX ADMIN — Medication 1 MILLIGRAM(S): at 12:45

## 2018-06-12 RX ADMIN — Medication 10 MILLIGRAM(S): at 12:45

## 2018-06-12 RX ADMIN — Medication 81 MILLIGRAM(S): at 12:45

## 2018-06-12 RX ADMIN — Medication 4 MILLILITER(S): at 18:21

## 2018-06-12 RX ADMIN — MIDODRINE HYDROCHLORIDE 10 MILLIGRAM(S): 2.5 TABLET ORAL at 06:02

## 2018-06-12 RX ADMIN — LEVETIRACETAM 500 MILLIGRAM(S): 250 TABLET, FILM COATED ORAL at 13:19

## 2018-06-12 RX ADMIN — Medication 10 MILLIGRAM(S): at 21:19

## 2018-06-12 RX ADMIN — Medication 5 MILLIGRAM(S): at 06:03

## 2018-06-12 RX ADMIN — Medication 5 MILLIGRAM(S): at 18:22

## 2018-06-12 RX ADMIN — INSULIN HUMAN 1: 100 INJECTION, SOLUTION SUBCUTANEOUS at 06:02

## 2018-06-12 RX ADMIN — Medication 1 TABLET(S): at 12:45

## 2018-06-12 RX ADMIN — ATORVASTATIN CALCIUM 80 MILLIGRAM(S): 80 TABLET, FILM COATED ORAL at 21:19

## 2018-06-12 RX ADMIN — Medication 4 MILLILITER(S): at 06:02

## 2018-06-12 RX ADMIN — INSULIN HUMAN 1: 100 INJECTION, SOLUTION SUBCUTANEOUS at 12:48

## 2018-06-12 RX ADMIN — INSULIN HUMAN 13 UNIT(S): 100 INJECTION, SOLUTION SUBCUTANEOUS at 18:21

## 2018-06-12 RX ADMIN — Medication 650 MILLIGRAM(S): at 21:19

## 2018-06-12 RX ADMIN — PIPERACILLIN AND TAZOBACTAM 200 GRAM(S): 4; .5 INJECTION, POWDER, LYOPHILIZED, FOR SOLUTION INTRAVENOUS at 13:10

## 2018-06-12 RX ADMIN — Medication 650 MILLIGRAM(S): at 22:11

## 2018-06-12 RX ADMIN — FLUCONAZOLE 100 MILLIGRAM(S): 150 TABLET ORAL at 13:19

## 2018-06-12 RX ADMIN — MODAFINIL 100 MILLIGRAM(S): 200 TABLET ORAL at 12:46

## 2018-06-12 RX ADMIN — MIDODRINE HYDROCHLORIDE 10 MILLIGRAM(S): 2.5 TABLET ORAL at 18:22

## 2018-06-12 RX ADMIN — Medication 650 MILLIGRAM(S): at 13:28

## 2018-06-12 RX ADMIN — INSULIN HUMAN 13 UNIT(S): 100 INJECTION, SOLUTION SUBCUTANEOUS at 00:25

## 2018-06-12 RX ADMIN — MIDODRINE HYDROCHLORIDE 10 MILLIGRAM(S): 2.5 TABLET ORAL at 12:52

## 2018-06-12 RX ADMIN — INSULIN HUMAN 13 UNIT(S): 100 INJECTION, SOLUTION SUBCUTANEOUS at 06:02

## 2018-06-12 RX ADMIN — INSULIN HUMAN 13 UNIT(S): 100 INJECTION, SOLUTION SUBCUTANEOUS at 12:48

## 2018-06-12 RX ADMIN — Medication 650 MILLIGRAM(S): at 06:01

## 2018-06-12 RX ADMIN — PIPERACILLIN AND TAZOBACTAM 200 GRAM(S): 4; .5 INJECTION, POWDER, LYOPHILIZED, FOR SOLUTION INTRAVENOUS at 21:19

## 2018-06-12 RX ADMIN — ERYTHROPOIETIN 10000 UNIT(S): 10000 INJECTION, SOLUTION INTRAVENOUS; SUBCUTANEOUS at 09:50

## 2018-06-12 RX ADMIN — PIPERACILLIN AND TAZOBACTAM 200 GRAM(S): 4; .5 INJECTION, POWDER, LYOPHILIZED, FOR SOLUTION INTRAVENOUS at 01:09

## 2018-06-12 RX ADMIN — Medication 650 MILLIGRAM(S): at 12:52

## 2018-06-12 RX ADMIN — CALCITRIOL 0.25 MICROGRAM(S): 0.5 CAPSULE ORAL at 12:46

## 2018-06-12 RX ADMIN — INSULIN HUMAN 2: 100 INJECTION, SOLUTION SUBCUTANEOUS at 18:21

## 2018-06-12 RX ADMIN — DOXERCALCIFEROL 2 MICROGRAM(S): 2.5 CAPSULE ORAL at 09:50

## 2018-06-12 NOTE — PROGRESS NOTE ADULT - SUBJECTIVE AND OBJECTIVE BOX
INTERVAL HPI/OVERNIGHT EVENTS:        VITAL SIGNS:        PHYSICAL EXAM:  Constitutional: Thin male w/ tracheostomy, ventilator dependent. Not in acute distress.   HEENT: PERRLA, Normal Hearing  Neck: No LAD, No JVD  Back: Normal spine flexure, No CVA tenderness  Respiratory: Normal air entry, Lungs clear to auscultation b/l w/o wheeze or crepitations.   Cardiovascular: S1 and S2 present - no additional abnormal sounds or murmurs. Normal rhythm and rate of pulse. AICD in place.   Gastrointestinal: BS+, soft, NT/ND  Extremities: No peripheral edema - discolored nails.   Vascular: 2+ peripheral pulses  Neurological: Nods to questions, and when asked if he knows where he is. No observed cranial nerve deficit.   Musculoskeletal: 5/5 strength b/l upper and lower extremities  Skin: Unstageable sacral ulcer - dressing overlying, stained with blood. When dressing removed, it is an open, crater-like ulceration.       MEDICATIONS  (STANDING):  acetaminophen    Suspension. 650 milliGRAM(s) Oral every 8 hours  acetylcysteine 20% Inhalation 4 milliLiter(s) Inhalation two times a day  aspirin  chewable 81 milliGRAM(s) Oral daily  atorvastatin 80 milliGRAM(s) Oral at bedtime  calcitriol  Solution 0.25 MICROGram(s) Oral daily  collagenase Ointment 1 Application(s) Topical daily  Dakins Solution - 1/4 Strength 1 Application(s) Topical daily  dextrose 5%. 1000 milliLiter(s) (50 mL/Hr) IV Continuous <Continuous>  dextrose 50% Injectable 12.5 Gram(s) IV Push once  dextrose 50% Injectable 25 Gram(s) IV Push once  dextrose 50% Injectable 25 Gram(s) IV Push once  escitalopram 5 milliGRAM(s) Oral daily  fluconAZOLE IVPB 200 milliGRAM(s) IV Intermittent every 24 hours  folic acid 1 milliGRAM(s) Oral daily  hydrocortisone 10 milliGRAM(s) Oral every 12 hours  insulin regular  human corrective regimen sliding scale   SubCutaneous <User Schedule>  insulin regular  human recombinant 13 Unit(s) SubCutaneous every 6 hours  levETIRAcetam  Solution 500 milliGRAM(s) Oral every 24 hours  metoclopramide 5 milliGRAM(s) Oral every 12 hours  midodrine 10 milliGRAM(s) Oral every 8 hours  modafinil 100 milliGRAM(s) Oral daily  multivitamin 1 Tablet(s) Oral daily  piperacillin/tazobactam IVPB. 2.25 Gram(s) IV Intermittent every 8 hours    MEDICATIONS  (PRN):  dextrose Gel 1 Dose(s) Oral once PRN Blood Glucose LESS THAN 70 milliGRAM(s)/deciliter  glucagon  Injectable 1 milliGRAM(s) IntraMuscular once PRN Glucose LESS THAN 70 milligrams/deciliter      Allergies  No Known Allergies  Intolerances      LABS: Drawing with Dialysis.          RADIOLOGY & ADDITIONAL TESTS:  < from: MR Pelvis Bony Only No Cont (06.08.18 @ 18:18) >  Impression: Sacral ulceration with subtle T1 with placement of the   posterior elements of the lower sacrum and STIR signal hyperintensity   compatible with osteomyelitis.    Ascites and anasarca with loculated collection in the pelvis abutting the   bladder. CT scan with contrast is recommended for further   characterization. INTERVAL HPI/OVERNIGHT EVENTS:  No overnight events.   Team meeting occurred yesterday - decision made to avoid escalation of antibiotics given the fact that his infection will likely be long-standing and un-resolving.     Patient underwent dialysis today - tolerated - has not needed albumin support in 2 weeks.       VITAL SIGNS:  T(F): 96.7 (12 Jun 2018 12:45), Max: 98.3 (12 Jun 2018 05:06)  HR: 81 (12 Jun 2018 12:45) (69 - 105)  BP: 88/59 (12 Jun 2018 12:45) (88/59 - 112/79)  RR: 21 (12 Jun 2018 12:45) (18 - 29)  SpO2: 100% (12 Jun 2018 12:45) (100% - 100%)      PHYSICAL EXAM:  Constitutional: Thin male w/ tracheostomy, ventilator dependent. Not in acute distress.   HEENT: PERRLA, Normal Hearing  Neck: No LAD, No JVD  Back: Normal spine flexure, No CVA tenderness  Respiratory: Normal air entry, Lungs clear to auscultation b/l w/o wheeze or crepitations.   Cardiovascular: S1 and S2 present - no additional abnormal sounds or murmurs. Normal rhythm and rate of pulse. AICD in place.   Gastrointestinal: BS+, soft, NT/ND  Extremities: No peripheral edema - discolored nails.   Vascular: 2+ peripheral pulses  Neurological: Nods to questions, and when asked if he knows where he is. No observed cranial nerve deficit.   Musculoskeletal: 5/5 strength b/l upper and lower extremities  Skin: Unstageable sacral ulcer - dressing overlying, stained with blood. When dressing removed, it is an open, crater-like ulceration.       MEDICATIONS  (STANDING):  acetaminophen    Suspension. 650 milliGRAM(s) Oral every 8 hours  acetylcysteine 20% Inhalation 4 milliLiter(s) Inhalation two times a day  aspirin  chewable 81 milliGRAM(s) Oral daily  atorvastatin 80 milliGRAM(s) Oral at bedtime  calcitriol  Solution 0.25 MICROGram(s) Oral daily  collagenase Ointment 1 Application(s) Topical daily  Dakins Solution - 1/4 Strength 1 Application(s) Topical daily  dextrose 5%. 1000 milliLiter(s) (50 mL/Hr) IV Continuous <Continuous>  dextrose 50% Injectable 12.5 Gram(s) IV Push once  dextrose 50% Injectable 25 Gram(s) IV Push once  dextrose 50% Injectable 25 Gram(s) IV Push once  escitalopram 5 milliGRAM(s) Oral daily  fluconAZOLE IVPB 200 milliGRAM(s) IV Intermittent every 24 hours  folic acid 1 milliGRAM(s) Oral daily  hydrocortisone 10 milliGRAM(s) Oral every 12 hours  insulin regular  human corrective regimen sliding scale   SubCutaneous <User Schedule>  insulin regular  human recombinant 13 Unit(s) SubCutaneous every 6 hours  levETIRAcetam  Solution 500 milliGRAM(s) Oral every 24 hours  metoclopramide 5 milliGRAM(s) Oral every 12 hours  midodrine 10 milliGRAM(s) Oral every 8 hours  modafinil 100 milliGRAM(s) Oral daily  multivitamin 1 Tablet(s) Oral daily  piperacillin/tazobactam IVPB. 2.25 Gram(s) IV Intermittent every 8 hours    MEDICATIONS  (PRN):  dextrose Gel 1 Dose(s) Oral once PRN Blood Glucose LESS THAN 70 milliGRAM(s)/deciliter  glucagon  Injectable 1 milliGRAM(s) IntraMuscular once PRN Glucose LESS THAN 70 milligrams/deciliter      Allergies  No Known Allergies  Intolerances      LABS: Drawing with Dialysis.          RADIOLOGY & ADDITIONAL TESTS:  < from: MR Pelvis Bony Only No Cont (06.08.18 @ 18:18) >  Impression: Sacral ulceration with subtle T1 with placement of the   posterior elements of the lower sacrum and STIR signal hyperintensity   compatible with osteomyelitis.    Ascites and anasarca with loculated collection in the pelvis abutting the   bladder. CT scan with contrast is recommended for further   characterization.

## 2018-06-12 NOTE — PROGRESS NOTE ADULT - SUBJECTIVE AND OBJECTIVE BOX
Patient was seen and evaluated on dialysis.   Patient is tolerating the procedure well.   HR: 69 (06-12-18 @ 09:16)  BP: 90/65 (06-12-18 @ 09:16)  Continue dialysis:   Revaclear 400, , , 2K bath   goal UF 2.5 kg over 3 hours.

## 2018-06-12 NOTE — PROGRESS NOTE ADULT - PROBLEM SELECTOR PLAN 3
Low K/Low phos/renal feeding  Continue Hectorol during dialysis  Reviewed results of repeat PTH, Vitamin D 1,25, Vitamin D 25

## 2018-06-12 NOTE — PROGRESS NOTE ADULT - PROBLEM SELECTOR PLAN 1
Patient is a 63 year old male with acute renal failure from ischemic ATN now requiring hemodialysis. Patient was last dialyzed on 6/9 with UF of 2.5L     P - dialysis today   Revaclear 400, , , 2K bath   goal UF 2.5 kg over 3 hours

## 2018-06-12 NOTE — PROGRESS NOTE ADULT - PROBLEM SELECTOR PLAN 5
Patient noted to have persistent leukocytosis. Source of infection thought to be due to sacral ulcer leading to osteomyelitis. ID on board. Family meeting planned for possibly Monday.

## 2018-06-12 NOTE — PROGRESS NOTE ADULT - PROBLEM SELECTOR PLAN 4
+ pain to hands when hands placed in splints.  multiple potential sites of pain (tubes, drain, wounds, artifical devices, prolonged immobility)  Pt had previously had hydromorphone 0.25mg iv q4h prn available. would consider same now.

## 2018-06-12 NOTE — PROGRESS NOTE ADULT - ATTENDING COMMENTS
Pt. seen and examined by me at 8:45AM; I have read Dr. Jaffe's note, I agree w/ her findings and plan of care as documented.

## 2018-06-12 NOTE — PROGRESS NOTE ADULT - PROBLEM SELECTOR PLAN 1
- Patient treated for Sacral Decub growing Pseudomonas for 7 days with IV Zosyn - since stopping, persistently elevating leukocytosis  - Last fevers 100.3 F (6/5) and 102.5 F (6/7)  - On skin exam - new purulence in ulcer (since debrided) - Pelvic MRI confirms acute osteomyelitis and a loculated collection abutting the bladder - f/u CT recommended, questionable need for drainage.   - Repeat Blood Cx, NGTD - Fungal Blood Cx sent   - UCx: Candida Dubliniensis   - C/w Fluconazole, 800mg loading dose, 200mg thereafter.     #Fungemia  Noted to have Candida Tropicalis growing in blood cultures and completed fluconazole course. - Patient treated for Sacral Decub growing Pseudomonas for 7 days with IV Zosyn - since stopping, persistently elevating leukocytosis  - Last fevers 100.3 F (6/5) and 102.5 F (6/7)  - On skin exam - new purulence in ulcer (since debrided) - Pelvic MRI confirms acute osteomyelitis and a loculated collection abutting the bladder. At this time, will not evaluate possible abscess further - will likely recur given poor prognosis for recovery from sacral ulcer infection.   - Repeat Blood Cx, NGTD - Fungal Blood Cx sent   - UCx: Candida Dubliniensis   - C/w Fluconazole, 800mg loading dose, 200mg thereafter.     #Fungemia  Noted to have Candida Tropicalis growing in blood cultures and completed fluconazole course.

## 2018-06-12 NOTE — PROGRESS NOTE ADULT - ASSESSMENT
63M PMH HFrEF 10-15% (ischemic), MI, s/p AICD vs PPM, possible Afib, HTN, DM2 on insulin, possible CKD, and gout, who presented with a chief complaint of generalized weakness (3/10/2018) s/p septic shock likely 2/2 LE cellulitis complicated by ATN requiring dialysis. Course complicated by AMS likely from brainstem infarct 2/2 LV thrombus vs toxic metabolic encephalopathy. Further complicated by development of candidemia and sepsis 2/2 klebsiella bacteremia s/p fluconazole and CTX, now resolved. s/p completed course of Zosyn for aspiration PNA.  Goals of care discussion for which patient made DNR, but with continued escalation of care. Continues to undergo management for respiratory failure with weaning as tolerated with CPAP, trach collar trials. With complications of hypotension, tolerating HD w/o albumin. Stable and ready for SD to [facility] for continued vent weaning prior to d/c to long term care facility with HD+vent weaning.    Pt remains hospitalized.  He is vent dependent and on HD.  Pt now had OM to his sacrum with a significant decubitus ulcer that is not amenable to surgical debridement.    Palliative Medicine has been asked to re consult on Mr. Montana's case due to the complex nature of his illness and his current limited prognosis due to the constellation of his medical problems.

## 2018-06-12 NOTE — PROGRESS NOTE ADULT - SUBJECTIVE AND OBJECTIVE BOX
Palliative Medicine  reconsulted on this 63 year old male who has been hospitalized over 3 months.  Pt was admitted with cellulitis in 2018.  His course was complicated by septic shock, cardiogenic shock, renal failure, respiratory failure, multiple infections and cva with seizure activity.  Pt remains mechanically vented, hemodialysis dependant, completely disabled requiring complete care for ADLs.  He is fed enterally through a PEG tube. Pt has developed significant decubitus ulcer which is concerning for osteomyelitis (work up in progress). As pt is fecally incontinent, he continues to contaminate the wound with fecal matter.    Unclear at this point, what is achievable in regard to control of infection from this ulcer as well as steps involved to remediate it and the ongoing use of antibiotics to treat same.  Additionally, pt remains profoundly debilitated with impaired quality of life.    Interval history:  Team meeting yesterday.   Pt stable. No change.    Pt's hand splints both not in correct position.  Pt appears uncomfortable when his hand are placed back into splints-- wincing.    	                   PAIN (0-10):  yes.    DYSPNEA (Y/N):  no  NAUSEA/VOMITING (Y/N):  no  SECRETIONS (Y/N):  no  AGITATION (Y/N): yes mildly    OTHER REVIEW OF SYSTEMS:  UNABLE TO OBTAIN  due to:  altered mental status    Vital Signs Last 24 Hrs  T(C): 36.8 (2018 09:16), Max: 37.2 (2018 15:30)  T(F): 98.3 (2018 09:16), Max: 98.9 (2018 15:30)  HR: 69 (2018 09:16) (69 - 105)  BP: 90/65 (2018 09:16) (88/67 - 117/61)  BP(mean): --  RR: 18 (2018 09:16) (18 - 29)  SpO2: 100% (2018 09:16) (98% - 100%)  Wt(kg): --    Daily     Daily Weight in k (2018 05:06)  (pt weight in 2018 is documented at 74 kgs - altho pt was fluid overloaded)    GENERAL:  arousable   HEENT:  NC/AT, normocephalic, sclera anicteric, temporal wasting, not opening eyes to command  NECK:    supple, no JVD, + tracheostomy  CVS:     reg, + left chest wall PPM, R sided perm cath  RESP:  vented, course, sonorous breath sounds  GI:    + PEG tube, flat, nontender, +BS  :    aneuric, on HD  Rectal:  rectal tube in place, stool leaking around same  MUSC:   no movement to Bilat LE, moving bilat UE against gravity  NEURO:   arousable, lethargic   PSYCH:    maritza       SKIN:    + sacral decub (not directly visualized by me)  LYMPH:      neg  	                   OPIATE NAÏVE (Y/N):  yes  ALLERGIES: No Known Allergies    MEDICATIONS  (STANDING):  acetaminophen    Suspension. 650 milliGRAM(s) Oral every 8 hours  acetylcysteine 20% Inhalation 4 milliLiter(s) Inhalation two times a day  aspirin  chewable 81 milliGRAM(s) Oral daily  atorvastatin 80 milliGRAM(s) Oral at bedtime  calcitriol  Solution 0.25 MICROGram(s) Oral daily  collagenase Ointment 1 Application(s) Topical daily  Dakins Solution - 1/4 Strength 1 Application(s) Topical daily  dextrose 5%. 1000 milliLiter(s) (50 mL/Hr) IV Continuous <Continuous>  dextrose 50% Injectable 12.5 Gram(s) IV Push once  dextrose 50% Injectable 25 Gram(s) IV Push once  dextrose 50% Injectable 25 Gram(s) IV Push once  escitalopram 5 milliGRAM(s) Oral daily  fluconAZOLE IVPB 200 milliGRAM(s) IV Intermittent every 24 hours  folic acid 1 milliGRAM(s) Oral daily  hydrocortisone 10 milliGRAM(s) Oral every 12 hours  insulin regular  human corrective regimen sliding scale   SubCutaneous <User Schedule>  insulin regular  human recombinant 13 Unit(s) SubCutaneous every 6 hours  levETIRAcetam  Solution 500 milliGRAM(s) Oral every 24 hours  metoclopramide 5 milliGRAM(s) Oral every 12 hours  midodrine 10 milliGRAM(s) Oral every 8 hours  modafinil 100 milliGRAM(s) Oral daily  multivitamin 1 Tablet(s) Oral daily  piperacillin/tazobactam IVPB. 2.25 Gram(s) IV Intermittent every 8 hours    MEDICATIONS  (PRN):  dextrose Gel 1 Dose(s) Oral once PRN Blood Glucose LESS THAN 70 milliGRAM(s)/deciliter  glucagon  Injectable 1 milliGRAM(s) IntraMuscular once PRN Glucose LESS THAN 70 milligrams/deciliter      	                   LABS REVIEWED                                             9.0    25.0  )-----------( 281      ( 2018 09:59 )             29.7     06-12    140  |  94<L>  |  70<H>  ----------------------------<  203<H>  5.1   |  26  |  4.34<H>    Ca    9.3      2018 10:26  Mg     2.2     06-11                  IMAGING REVIEWED:    no new imaging  	  ADVANCED DIRECTIVES:    DNR     PSYCHOSOCIAL-SPIRITUAL ASSESSMENT:       Care plan unchanged          GOALS OF CARE DISCUSSION:       Palliative care info/counseling provided	           Family meeting-- tentatively for Friday [address]       Advanced Directives; DNR as of May 2018       See previous Palliative Medicine Note  	        REFERRALS:        Palliative Med        Unit SW/Case Mgmt              Patient/Family Support              Nutrition       Ethics       PT/OT

## 2018-06-12 NOTE — PROGRESS NOTE ADULT - SUBJECTIVE AND OBJECTIVE BOX
Patient seen and examined at bedside.  Patient is a 63 year old male with a history of HFrEF 10-15% due to ischemic cardiomyopathy, s/p AICD, ?atrial fibrillation, hypertension, diabetes mellitus type 2, gout, and CKD for whom nephrology was called for GILMER from ATN requiring hemodialysis. Patient appears clinically unchanged. Patient was last dialyzed 6/9 with UF of 2.5L     acetaminophen    Suspension. 650 milliGRAM(s) every 8 hours  acetylcysteine 20% Inhalation 4 milliLiter(s) two times a day  aspirin  chewable 81 milliGRAM(s) daily  atorvastatin 80 milliGRAM(s) at bedtime  calcitriol  Solution 0.25 MICROGram(s) daily  collagenase Ointment 1 Application(s) daily  Dakins Solution - 1/4 Strength 1 Application(s) daily  dextrose 5%. 1000 milliLiter(s) <Continuous>  dextrose 50% Injectable 12.5 Gram(s) once  dextrose 50% Injectable 25 Gram(s) once  dextrose 50% Injectable 25 Gram(s) once  dextrose Gel 1 Dose(s) once PRN  escitalopram 5 milliGRAM(s) daily  fluconAZOLE IVPB 200 milliGRAM(s) every 24 hours  folic acid 1 milliGRAM(s) daily  glucagon  Injectable 1 milliGRAM(s) once PRN  hydrocortisone 10 milliGRAM(s) every 12 hours  insulin regular  human corrective regimen sliding scale   <User Schedule>  insulin regular  human recombinant 13 Unit(s) every 6 hours  levETIRAcetam  Solution 500 milliGRAM(s) every 24 hours  metoclopramide 5 milliGRAM(s) every 12 hours  midodrine 10 milliGRAM(s) every 8 hours  modafinil 100 milliGRAM(s) daily  multivitamin 1 Tablet(s) daily  piperacillin/tazobactam IVPB. 2.25 Gram(s) every 8 hours    Allergies    No Known Allergies    Intolerances    T(C): , Max: 37.2 (06-11-18 @ 15:30)  T(F): , Max: 98.9 (06-11-18 @ 15:30)  HR: 69 (06-12-18 @ 09:16)  BP: 90/65 (06-12-18 @ 09:16)  RR: 18 (06-12-18 @ 09:16)  SpO2: 100% (06-12-18 @ 09:16)    06-11 @ 07:01  -  06-12 @ 07:00  --------------------------------------------------------  IN:    Enteral Tube Flush: 60 mL    Jevity: 693 mL    Solution: 100 mL  Total IN: 853 mL    OUT:  Total OUT: 0 mL    Total NET: 853 mL    LABS:                        9.0    25.0  )-----------( 281      ( 12 Jun 2018 09:59 )             29.7     06-12    140  |  94<L>  |  70<H>  ----------------------------<  203<H>  5.1   |  26  |  4.34<H>    Ca    9.3      12 Jun 2018 10:26  Mg     2.2     06-11    PT/INR - ( 12 Jun 2018 09:59 )   PT: 41.0 sec;   INR: 3.60

## 2018-06-13 LAB
C BURNET PH1 + PH2 IGG+IGM SER-IMP: SIGNIFICANT CHANGE UP
C BURNET PH1 IGG TITR FLD: SIGNIFICANT CHANGE UP
C BURNET PH1 IGG TITR FLD: SIGNIFICANT CHANGE UP
C BURNET PH1 IGM TITR FLD: SIGNIFICANT CHANGE UP
C BURNET PH1 IGM TITR FLD: SIGNIFICANT CHANGE UP
GLUCOSE BLDC GLUCOMTR-MCNC: 258 MG/DL — HIGH (ref 70–99)
GLUCOSE BLDC GLUCOMTR-MCNC: 258 MG/DL — HIGH (ref 70–99)
GLUCOSE BLDC GLUCOMTR-MCNC: 268 MG/DL — HIGH (ref 70–99)
GLUCOSE BLDC GLUCOMTR-MCNC: 297 MG/DL — HIGH (ref 70–99)
INR BLD: 2.69 — HIGH (ref 0.88–1.16)
PROTHROM AB SERPL-ACNC: 30.5 SEC — HIGH (ref 9.8–12.7)

## 2018-06-13 PROCEDURE — 99232 SBSQ HOSP IP/OBS MODERATE 35: CPT | Mod: GC

## 2018-06-13 PROCEDURE — 99232 SBSQ HOSP IP/OBS MODERATE 35: CPT

## 2018-06-13 RX ORDER — WARFARIN SODIUM 2.5 MG/1
2.5 TABLET ORAL ONCE
Qty: 0 | Refills: 0 | Status: COMPLETED | OUTPATIENT
Start: 2018-06-13 | End: 2018-06-13

## 2018-06-13 RX ADMIN — INSULIN HUMAN 3: 100 INJECTION, SOLUTION SUBCUTANEOUS at 07:12

## 2018-06-13 RX ADMIN — LEVETIRACETAM 500 MILLIGRAM(S): 250 TABLET, FILM COATED ORAL at 13:30

## 2018-06-13 RX ADMIN — Medication 4 MILLILITER(S): at 18:44

## 2018-06-13 RX ADMIN — MIDODRINE HYDROCHLORIDE 10 MILLIGRAM(S): 2.5 TABLET ORAL at 13:23

## 2018-06-13 RX ADMIN — CALCITRIOL 0.25 MICROGRAM(S): 0.5 CAPSULE ORAL at 13:23

## 2018-06-13 RX ADMIN — ATORVASTATIN CALCIUM 80 MILLIGRAM(S): 80 TABLET, FILM COATED ORAL at 21:55

## 2018-06-13 RX ADMIN — INSULIN HUMAN 13 UNIT(S): 100 INJECTION, SOLUTION SUBCUTANEOUS at 00:08

## 2018-06-13 RX ADMIN — Medication 650 MILLIGRAM(S): at 13:25

## 2018-06-13 RX ADMIN — Medication 1 APPLICATION(S): at 13:26

## 2018-06-13 RX ADMIN — Medication 4 MILLILITER(S): at 06:43

## 2018-06-13 RX ADMIN — Medication 650 MILLIGRAM(S): at 07:52

## 2018-06-13 RX ADMIN — PIPERACILLIN AND TAZOBACTAM 200 GRAM(S): 4; .5 INJECTION, POWDER, LYOPHILIZED, FOR SOLUTION INTRAVENOUS at 06:20

## 2018-06-13 RX ADMIN — Medication 650 MILLIGRAM(S): at 06:20

## 2018-06-13 RX ADMIN — MIDODRINE HYDROCHLORIDE 10 MILLIGRAM(S): 2.5 TABLET ORAL at 18:46

## 2018-06-13 RX ADMIN — PIPERACILLIN AND TAZOBACTAM 200 GRAM(S): 4; .5 INJECTION, POWDER, LYOPHILIZED, FOR SOLUTION INTRAVENOUS at 13:26

## 2018-06-13 RX ADMIN — INSULIN HUMAN 3: 100 INJECTION, SOLUTION SUBCUTANEOUS at 18:44

## 2018-06-13 RX ADMIN — Medication 650 MILLIGRAM(S): at 22:55

## 2018-06-13 RX ADMIN — INSULIN HUMAN 13 UNIT(S): 100 INJECTION, SOLUTION SUBCUTANEOUS at 07:12

## 2018-06-13 RX ADMIN — INSULIN HUMAN 3: 100 INJECTION, SOLUTION SUBCUTANEOUS at 13:24

## 2018-06-13 RX ADMIN — MODAFINIL 100 MILLIGRAM(S): 200 TABLET ORAL at 13:24

## 2018-06-13 RX ADMIN — Medication 81 MILLIGRAM(S): at 13:23

## 2018-06-13 RX ADMIN — Medication 5 MILLIGRAM(S): at 06:43

## 2018-06-13 RX ADMIN — INSULIN HUMAN 13 UNIT(S): 100 INJECTION, SOLUTION SUBCUTANEOUS at 18:44

## 2018-06-13 RX ADMIN — Medication 10 MILLIGRAM(S): at 13:24

## 2018-06-13 RX ADMIN — MIDODRINE HYDROCHLORIDE 10 MILLIGRAM(S): 2.5 TABLET ORAL at 06:43

## 2018-06-13 RX ADMIN — Medication 5 MILLIGRAM(S): at 18:46

## 2018-06-13 RX ADMIN — INSULIN HUMAN 13 UNIT(S): 100 INJECTION, SOLUTION SUBCUTANEOUS at 13:25

## 2018-06-13 RX ADMIN — WARFARIN SODIUM 2.5 MILLIGRAM(S): 2.5 TABLET ORAL at 21:55

## 2018-06-13 RX ADMIN — ESCITALOPRAM OXALATE 5 MILLIGRAM(S): 10 TABLET, FILM COATED ORAL at 13:24

## 2018-06-13 RX ADMIN — Medication 1 TABLET(S): at 13:24

## 2018-06-13 RX ADMIN — INSULIN HUMAN 2: 100 INJECTION, SOLUTION SUBCUTANEOUS at 00:08

## 2018-06-13 RX ADMIN — Medication 1 APPLICATION(S): at 13:25

## 2018-06-13 RX ADMIN — Medication 650 MILLIGRAM(S): at 21:55

## 2018-06-13 RX ADMIN — PIPERACILLIN AND TAZOBACTAM 200 GRAM(S): 4; .5 INJECTION, POWDER, LYOPHILIZED, FOR SOLUTION INTRAVENOUS at 21:55

## 2018-06-13 RX ADMIN — Medication 1 MILLIGRAM(S): at 13:24

## 2018-06-13 RX ADMIN — FLUCONAZOLE 100 MILLIGRAM(S): 150 TABLET ORAL at 13:31

## 2018-06-13 NOTE — PROGRESS NOTE ADULT - PROBLEM SELECTOR PLAN 4
Stable. Persistently hypotensive throughout admission, now on midodrine 15 mg TID and hydrocortisone 10mg BID for concern of adrenal insufficiency. Has intermittently used albumin to tolerate dialysis.   - c/w Midodrine 10 mg q8h + add 10 midodrine pre-HD  - hydrocortisone 10mg BID (tapered to this dose on 5/29)  - tolerated HD w/o albumin, f/u renal recs

## 2018-06-13 NOTE — PROGRESS NOTE ADULT - PROBLEM SELECTOR PLAN 1
Patient is a 63 year old male with acute renal failure from ischemic ATN now requiring hemodialysis. Patient was last dialyzed on 6/12 with UF of 2.5L     P - Patient does not require emergent dialysis because volume status is acceptable.   Anticipate next dialysis 6/14.

## 2018-06-13 NOTE — PROGRESS NOTE ADULT - PROBLEM SELECTOR PLAN 8
Stable. Requiring transfusions intermittently. Likely 2/2 CKD. Iron studies consistent with anemia of chronic disease.   - Stable H/H this AM  - Keep active T&S  - Monitor H/H, transfuse with Hgb goal >7

## 2018-06-13 NOTE — PROGRESS NOTE ADULT - PROBLEM SELECTOR PLAN 5
Stable. Intermittently following commands. Likely multifactorial in setting of septic and cardiogenic shock as well as brain stem infarct during admission. MRI head (3/26) significant for several old post circulation infarcts and possible new area of brainstem infarct. Per neurology may have had ischemic event from hypoperfusion. Also showing disc protrusion at C3/C4 level coursing superiorly to the C2/C3 contacting the ventral spinal cord.   - c/w Modafinil  - c/w Keppra 500 mg qd with extra 250 mg post HD days for seizures

## 2018-06-13 NOTE — PROGRESS NOTE ADULT - PROBLEM SELECTOR PLAN 10
VTE: Coumadin dosed nightly, with INR daily checks, goal 2-3  GI PPx: PPI  DNR- Made DNR, family wants escalation of care, but no escalation of antibiotics, pressors if needed.  F: No IVF in setting of HD  E: Replete electrolytes with caution in setting of HD  N: Jevity 1.5 goal rate 62cc/hr  Dispo: ongoing d/w family regarding long term placement VTE: Coumadin dosed nightly, with INR daily checks, goal 2-3  GI PPx: PPI  DNR- Made DNR, family wants escalation of care, pressors if needed.  F: No IVF in setting of HD  E: Replete electrolytes with caution in setting of HD  N: Jevity 1.5 goal rate 62cc/hr  Dispo: ongoing d/w family regarding treatment goals, placement, antibiotic therapy

## 2018-06-13 NOTE — PROGRESS NOTE ADULT - PROBLEM SELECTOR PLAN 2
Acute respiratory failure in setting of septic and cardiogenic shock, with trach placed on 4/5/18 for persistent, now chronic respiratory failure. CXR showing effusions. On daily HD.   - Intermittent tachypnea likely a component of pain, Dilaudid 0.25mg IV PRN  - ENT to replace trach before patient is accepted to [facility]

## 2018-06-13 NOTE — CHART NOTE - NSCHARTNOTEFT_GEN_A_CORE
Admitting Diagnosis:   63M PMH HFrEF 10-15% (ischemic), MI, s/p AICD vs PPM, possible Afib, HTN, DM2 on insulin, possible CKD, and gout, who presents with a chief complaint of generalized weakness s/p septic shock likely 2/2 LE cellulitis. AMS and new leukocytisis likely 2/2 UTI. Trach and PEG dependent. Continues to receive HD TIW. Stable on RMF      PAST MEDICAL & SURGICAL HISTORY:  Type 2 diabetes mellitus with diabetic peripheral angiopathy without gangrene, with long-term current use  Essential hypertension, benign  Gout  Pacemaker  Chronic systolic heart failure  Myocardial infarction  No significant past surgical history      Current Nutrition Order:   Jevity 1.5 Terrell @ 62mL/hr x 24hrs plus ProStat Sugar Free BID (200 kcal, 30g protein) to provide in total: 1488 mL TV, 2432 kcal, 125g protein, 1131 mL free H2O, 148% RDI, 1.40g/kg IBW protein.      PO Intake: Good (%) [   ]  Fair (50-75%) [   ] Poor (<25%) [   ]- N/A NPO with TF    GI Issues: Good tolerance per RN    Pain: Unable to assess at this time 2/2 vent     Skin Integrity:  L buttock unstageable; R. buttock II , sacrum unstageable   L calf venous ulcer  Trach stage 3 PU  L. UE eschar     Labs:       140  |  94<L>  |  70<H>  ----------------------------<  203<H>  5.1   |  26  |  4.34<H>    Ca    9.3      2018 10:26      CAPILLARY BLOOD GLUCOSE      POCT Blood Glucose.: 258 mg/dL (2018 08:29)  POCT Blood Glucose.: 258 mg/dL (2018 06:42)  POCT Blood Glucose.: 210 mg/dL (2018 23:56)  POCT Blood Glucose.: 241 mg/dL (2018 17:26)  POCT Blood Glucose.: 184 mg/dL (2018 12:23)      Medications:  MEDICATIONS  (STANDING):  acetaminophen    Suspension. 650 milliGRAM(s) Oral every 8 hours  acetylcysteine 20% Inhalation 4 milliLiter(s) Inhalation two times a day  aspirin  chewable 81 milliGRAM(s) Oral daily  atorvastatin 80 milliGRAM(s) Oral at bedtime  calcitriol  Solution 0.25 MICROGram(s) Oral daily  collagenase Ointment 1 Application(s) Topical daily  Dakins Solution - 1/4 Strength 1 Application(s) Topical daily  dextrose 5%. 1000 milliLiter(s) (50 mL/Hr) IV Continuous <Continuous>  dextrose 50% Injectable 12.5 Gram(s) IV Push once  dextrose 50% Injectable 25 Gram(s) IV Push once  dextrose 50% Injectable 25 Gram(s) IV Push once  escitalopram 5 milliGRAM(s) Oral daily  fluconAZOLE IVPB 200 milliGRAM(s) IV Intermittent every 24 hours  folic acid 1 milliGRAM(s) Oral daily  hydrocortisone 10 milliGRAM(s) Oral every 12 hours  insulin regular  human corrective regimen sliding scale   SubCutaneous <User Schedule>  insulin regular  human recombinant 13 Unit(s) SubCutaneous every 6 hours  levETIRAcetam  Solution 500 milliGRAM(s) Oral every 24 hours  metoclopramide 5 milliGRAM(s) Oral every 12 hours  midodrine 10 milliGRAM(s) Oral every 8 hours  modafinil 100 milliGRAM(s) Oral daily  multivitamin 1 Tablet(s) Oral daily  piperacillin/tazobactam IVPB. 2.25 Gram(s) IV Intermittent every 8 hours  warfarin 2.5 milliGRAM(s) Oral once    MEDICATIONS  (PRN):  dextrose Gel 1 Dose(s) Oral once PRN Blood Glucose LESS THAN 70 milliGRAM(s)/deciliter  glucagon  Injectable 1 milliGRAM(s) IntraMuscular once PRN Glucose LESS THAN 70 milligrams/deciliter      Weight:  Daily     Daily Weight in k (2018 12:45)    Weight:  71.1kg ()  74.3kg ()  74.6kg ()  80.3kg ()  85.6kg ()  86.8kg ()  86.2kg (5/3)  85.1kg ()  79.1kg ()  75.9kg ()  77.4kg ()  72.7kg ()  77.2kg ()  80.9 kg ()  84.5kg (3/31)  86.9kg (3/19)  94.7 kg (3/16)    Weight Change:   Weight fluctuating throughout admission d/t HD, TF inconsistencies    Estimated energy needs using 89 kg IBW; Needs estimated / vent/post-op/PU/HD  Calories: 25-30 kcal/kg = 8164-5065 kcal/day  Protein: 1.4-1.6 g/kg = 125-142 g protein/day  Fluids: per team / HD     Subjective:   S/p trach and PEG placements on . S/p permacath replacement on . Stable on RMF at this time. Course c/b acute osteomyelitis 2/2 infected sacral decubitus ulcer, as seen on pelvic MRI. Pt seen in room, asleep, not arousable to verbal stimuli, left to rest. Last HD . Trached to vent on VC/AC mode. TF running at current goal rate of 62mL/hr w/good tolerance. Family meeting planned for next week. Continuing to try to wean vent prior to d/c to LTAC. Lytes WNL. POC BG 258mg/dL. Will continue to monitor for goals of care and conner nutrition in line at all times.      Previous Nutrition Diagnosis:   Increased protein-calorie needs RT increased demand for protein-calorie intake AEB on vent support, ESRD on HD, malnutrition     Active [X]  Resolved [   ]    New PES statement:     Goal:   Continue to meet % of nutrition needs via tolerated route.     Recommendations:  1. Continue w/current EN order; monitor for s/s intolerance, maintain aspiration precautions   2. Continue to trend weights pre/post HD  3. Monitor POC BG and provide adequate insulin coverage  4. Continue to monitor for GOC and keep nutrition in line at all times     Education:   N/A-vent    Risk Level: High [  ] Moderate [ X  ] Low [   ].

## 2018-06-13 NOTE — PROGRESS NOTE ADULT - SUBJECTIVE AND OBJECTIVE BOX
Patient seen and examined at bedside. Patient is a 63 year old male with a history of HFrEF 10-15% due to ischemic cardiomyopathy, s/p AICD, ?atrial fibrillation, hypertension, diabetes mellitus type 2, gout, and CKD for whom nephrology was called for [facility] from [facility] requiring hemodialysis. Patient appears unchanged. Patient was last dialyzed 6/12 with UF of 2.5L.     acetaminophen    Suspension. 650 milliGRAM(s) every 8 hours  acetylcysteine 20% Inhalation 4 milliLiter(s) two times a day  aspirin  chewable 81 milliGRAM(s) daily  atorvastatin 80 milliGRAM(s) at bedtime  calcitriol  Solution 0.25 MICROGram(s) daily  collagenase Ointment 1 Application(s) daily  Dakins Solution - 1/4 Strength 1 Application(s) daily  dextrose 5%. 1000 milliLiter(s) <Continuous>  dextrose 50% Injectable 12.5 Gram(s) once  dextrose 50% Injectable 25 Gram(s) once  dextrose 50% Injectable 25 Gram(s) once  dextrose Gel 1 Dose(s) once PRN  escitalopram 5 milliGRAM(s) daily  fluconAZOLE IVPB 200 milliGRAM(s) every 24 hours  folic acid 1 milliGRAM(s) daily  glucagon  Injectable 1 milliGRAM(s) once PRN  hydrocortisone 10 milliGRAM(s) every 12 hours  insulin regular  human corrective regimen sliding scale   <User Schedule>  insulin regular  human recombinant 13 Unit(s) every 6 hours  levETIRAcetam  Solution 500 milliGRAM(s) every 24 hours  metoclopramide 5 milliGRAM(s) every 12 hours  midodrine 10 milliGRAM(s) every 8 hours  modafinil 100 milliGRAM(s) daily  multivitamin 1 Tablet(s) daily  piperacillin/tazobactam IVPB. 2.25 Gram(s) every 8 hours  warfarin 2.5 milliGRAM(s) once    Allergies    No Known Allergies    Intolerances    T(C): , Max: 37.7 (06-12-18 @ 20:50)  T(F): , Max: 99.8 (06-12-18 @ 20:50)  HR: 81 (06-13-18 @ 08:39)  BP: 93/57 (06-13-18 @ 08:39)  RR: 18 (06-13-18 @ 08:39)  SpO2: 99% (06-13-18 @ 08:14)    06-12 @ 07:01  -  06-13 @ 07:00  --------------------------------------------------------  IN:  Total IN: 0 mL    OUT:    Other: 2500 mL  Total OUT: 2500 mL    Total NET: -2500 mL    LABS:                        9.0    25.0  )-----------( 281      ( 12 Jun 2018 09:59 )             29.7     06-12    140  |  94<L>  |  70<H>  ----------------------------<  203<H>  5.1   |  26  |  4.34<H>    Ca    9.3      12 Jun 2018 10:26    PT/INR - ( 13 Jun 2018 07:30 )   PT: 30.5 sec;   INR: 2.69

## 2018-06-13 NOTE — CHART NOTE - NSCHARTNOTEFT_GEN_A_CORE
Complete a 2-week course of fluconazole for Candida dubliniensis UTI.  Continue Zosyn for now--decision to continue antibiotics to be discussed at family meeting next week.     Please reconsult with ?  To see again as requested

## 2018-06-13 NOTE — PROGRESS NOTE ADULT - PROBLEM SELECTOR PLAN 3
Low K/Low phos/renal feeding  Continue Hectorol during next dialysis  Reviewed results of repeat PTH, Vitamin D 1,25, Vitamin D 25

## 2018-06-13 NOTE — PROGRESS NOTE ADULT - PROBLEM SELECTOR PLAN 1
#Sepsis-  - c/w treatment for sarcral decub and UTI as below  - f/u bcxs and fungal cxs    #Sacral decubitus ulcer- unstageable s/p debridement. Growing Pseudomonas, now on Zosyn. Pelvic MRI confirms acute osteomyelitis and a loculated collection abutting the bladder. At this time, will not evaluate possible abscess further - will likely recur given poor prognosis for recovery from sacral ulcer infection. Family decision to continue with current abx and avoid escalation of abx.   - Repeat Blood Cx, NGTD - Fungal Blood Cx sent   - c/w wound care    #UTI- urine cultures growing Sherrie Dubliniensis   - C/w Fluconazole 200mg #Sepsis- Resolved.   - c/w treatment for sacral decub and UTI as below  - f/u bcxs- NGTD- and fungal cxs    #Sacral decubitus ulcer- unstageable s/p debridement. Growing Pseudomonas and E faecium, now on Zosyn. Pelvic MRI confirms acute osteomyelitis and a loculated collection abutting the bladder. At this time, will not evaluate possible abscess further - will likely recur given poor prognosis for recovery from sacral ulcer infection. Family decision to avoid escalation of abx.   - c/w Zosyn (6/4-)  - Repeat Blood Cx, NGTD   - F/u Fungal Blood cxs  - c/w wound care    #UTI- Patient with urinary retention requiring chronic indwelling Jose catheter. Urine cultures growing Sherrie Dubliniensis sensitive to Fluconazole.  - C/w Fluconazole 200mg (6/7-) #Sepsis- Resolved.   - c/w treatment for sacral decub and UTI as below  - f/u bcxs- NGTD- and fungal cxs    #Sacral decubitus ulcer- unstageable s/p debridement. Growing Pseudomonas and E faecium, now on Zosyn. Pelvic MRI confirms acute osteomyelitis and a loculated collection abutting the bladder. At this time, will not evaluate possible abscess further - will likely recur given poor prognosis for recovery from sacral ulcer infection. Family decision to avoid escalation of abx.   - c/w Zosyn (6/4-) for now  - Repeat Blood Cx, NGTD   - F/u Fungal Blood cxs  - c/w wound care    #UTI- Patient with urinary retention requiring chronic indwelling Jose catheter. Urine cultures growing Sehrrie Dubliniensis sensitive to Fluconazole.  - C/w Fluconazole 200mg (6/7- 6/21) for 2 week course per ID #Sepsis- Resolved.   - c/w treatment for sacral decub and UTI as below  - f/u bcxs- NGTD- and fungal cxs    #Sacral decubitus ulcer- unstageable s/p debridement with plastic surgery. Cultures growing multiple organisms including Pseudomonas and E faecium, now on Zosyn per sensitivities. Pelvic MRI showing acute osteomyelitis and loculated collection abutting the bladder. At this time, will not evaluate possible abscess further - will likely recur given poor prognosis for recovery from sacral ulcer infection. Family decision to avoid escalation of abx.   - c/w Zosyn (6/4-) for now  - Repeat Blood Cx, NGTD   - F/u Fungal Blood cxs  - c/w wound care    #UTI- Patient with urinary retention requiring chronic indwelling Jose catheter. Urine cultures growing Sherrie Dubliniensis sensitive to Fluconazole.  - C/w Fluconazole 200mg (6/7- 6/21) for 2 week course per ID #Sepsis- Resolved.   - c/w treatment for sacral decub and UTI as below  - f/u bcxs- NGTD- and fungal cxs    #Sacral decubitus ulcer- unstageable s/p debridement with plastic surgery. Cultures growing multiple organisms including Pseudomonas and E faecium, now on Zosyn per sensitivities. Pelvic MRI showing acute osteomyelitis and loculated collection abutting the bladder. Patient is not a surgical candidate based on his comorbidities and inability lay in prone positioning postop ([id number]). At this time, will not evaluate possible abscess further - will likely recur given poor prognosis.  - c/w Zosyn (6/4-), will d/w family about continuation of abx therapy given that benefit/risk may favor discontinuation to avoid resistance given that ulcer is uncurable   - frequent turning q2h for pressure offloading, air fluid mattress  - wound care with dry dressings with Allevyn over top  - soilage control with rectal tube, benefits outweigh risks at this point   - Nutrition support  - Bcxs NGTD, will f/u Fungal Bcxs    #UTI- Patient with urinary retention requiring chronic indwelling Jose catheter. Urine cultures growing Sherrie Dubliniensis sensitive to Fluconazole.  - C/w Fluconazole 200mg (6/7- 6/21) for 2 week course per ID

## 2018-06-13 NOTE — PROGRESS NOTE ADULT - ATTENDING COMMENTS
Pt. seen and examined by me this morning; I have read Dr. Juárez's note, I agree w/ her findings and plan of care as documented.

## 2018-06-13 NOTE — PROGRESS NOTE ADULT - PROBLEM SELECTOR PLAN 7
Hx of AF and LV thrombus on Coumadin prior to admission. Most recent TTE with definity shows no LV thrombus currently.   - Not currently on rate control as HR has remained   - Lopressor pushes for HR goal <110  - dose Coumadin daily based on INR, will consider qod INR checks with HD to minimize lab draws    #LV thrombus  LV thrombus noted on RUKHSANA on 4/10. Remains therapeutic on warfarin.  - Dose Coumadin based on INR

## 2018-06-13 NOTE — PROGRESS NOTE ADULT - SUBJECTIVE AND OBJECTIVE BOX
PGY1 PROGRESS NOTE:    OVERNIGHT EVENTS: ELSIE    SUBJECTIVE / INTERVAL HPI: Patient seen and examined at bedside.     VITAL SIGNS:  Vital Signs Last 24 Hrs  T(C): 37.2 (13 Jun 2018 06:04), Max: 37.7 (12 Jun 2018 20:50)  T(F): 98.9 (13 Jun 2018 06:04), Max: 99.8 (12 Jun 2018 20:50)  HR: 92 (13 Jun 2018 06:04) (69 - 107)  BP: 91/54 (13 Jun 2018 06:04) (86/51 - 91/54)  RR: 18 (13 Jun 2018 06:04) (18 - 29)  SpO2: 100% (13 Jun 2018 06:04) (99% - 100%)    PHYSICAL EXAM:  Constitutional: thin, cachectic, chromically ill appearing, tracheostomy on ventilator, NAD    HEENT: temporal wasting, PERRLA, EOMI, no oropharyngeal lesions  Neck: No JVD  Respiratory: course breath sounds b/l, CTA b/l, no wheeze, rales, rhonchi  Cardiovascular: AICD in place, normal S1S2, no MRG  Gastrointestinal: mildly distended, soft, non-tender, +BS  Extremities: hypertrophied skin LE, discolored nails, trace edema LE b/l  Vascular: 2+ peripheral pulses  Neurological: intermittently able to nod head to questions and following commands, moving all extremities spontaneously, favoring R arm, no focal deficits  Skin: Unstageable sacral ulcer - dressing overlying, stained with blood. When dressing removed, it is an open, crater-like ulceration.    MEDICATIONS:  MEDICATIONS  (STANDING):  acetaminophen    Suspension. 650 milliGRAM(s) Oral every 8 hours  acetylcysteine 20% Inhalation 4 milliLiter(s) Inhalation two times a day  aspirin  chewable 81 milliGRAM(s) Oral daily  atorvastatin 80 milliGRAM(s) Oral at bedtime  calcitriol  Solution 0.25 MICROGram(s) Oral daily  collagenase Ointment 1 Application(s) Topical daily  Dakins Solution - 1/4 Strength 1 Application(s) Topical daily  dextrose 5%. 1000 milliLiter(s) (50 mL/Hr) IV Continuous <Continuous>  dextrose 50% Injectable 12.5 Gram(s) IV Push once  dextrose 50% Injectable 25 Gram(s) IV Push once  dextrose 50% Injectable 25 Gram(s) IV Push once  escitalopram 5 milliGRAM(s) Oral daily  fluconAZOLE IVPB 200 milliGRAM(s) IV Intermittent every 24 hours  folic acid 1 milliGRAM(s) Oral daily  hydrocortisone 10 milliGRAM(s) Oral every 12 hours  insulin regular  human corrective regimen sliding scale   SubCutaneous <User Schedule>  insulin regular  human recombinant 13 Unit(s) SubCutaneous every 6 hours  levETIRAcetam  Solution 500 milliGRAM(s) Oral every 24 hours  metoclopramide 5 milliGRAM(s) Oral every 12 hours  midodrine 10 milliGRAM(s) Oral every 8 hours  modafinil 100 milliGRAM(s) Oral daily  multivitamin 1 Tablet(s) Oral daily  piperacillin/tazobactam IVPB. 2.25 Gram(s) IV Intermittent every 8 hours    MEDICATIONS  (PRN):  dextrose Gel 1 Dose(s) Oral once PRN Blood Glucose LESS THAN 70 milliGRAM(s)/deciliter  glucagon  Injectable 1 milliGRAM(s) IntraMuscular once PRN Glucose LESS THAN 70 milligrams/deciliter      ALLERGIES:  No Known Allergies      LABS:                        9.0    25.0  )-----------( 281      ( 12 Jun 2018 09:59 )             29.7     06-12    140  |  94<L>  |  70<H>  ----------------------------<  203<H>  5.1   |  26  |  4.34<H>    Ca    9.3      12 Jun 2018 10:26  Mg     2.2     06-11      PT/INR - ( 12 Jun 2018 09:59 )   PT: 41.0 sec;   INR: 3.60         RADIOLOGY & ADDITIONAL TESTS: Reviewed. PGY1 PROGRESS NOTE:    CC: Patient is a 63y old  Male who presents with a chief complaint of Septic Shock due to bilateral cellulitis - course complicated by renal failure (on hemodialysis), stroke, and respiratory failure. (01 Jun 2018 14:51)    OVERNIGHT EVENTS: ELSIE    SUBJECTIVE / INTERVAL HPI: Patient seen and examined at bedside. Patient not following commands this morning. Seen moving all extremities and grimacing to tactile and verbal stimulus. Unable to assess ROS.    ROS: negative unless otherwise specified    VITAL SIGNS:  Vital Signs Last 24 Hrs  T(C): 37.2 (13 Jun 2018 06:04), Max: 37.7 (12 Jun 2018 20:50)  T(F): 98.9 (13 Jun 2018 06:04), Max: 99.8 (12 Jun 2018 20:50)  HR: 92 (13 Jun 2018 06:04) (69 - 107)  BP: 91/54 (13 Jun 2018 06:04) (86/51 - 91/54)  RR: 18 (13 Jun 2018 06:04) (18 - 29)  SpO2: 100% (13 Jun 2018 06:04) (99% - 100%)    PHYSICAL EXAM:  Constitutional: thin, cachectic, chromically ill appearing, tracheostomy on ventilator, NAD    HEENT: temporal wasting, PERRLA, EOMI, dry blood on lips and oral mucosa, dry MM  Neck: No JVD, tracheostomy with minimal mucoid discharge  Respiratory: course breath sounds b/l, CTA b/l, no wheeze, rales, rhonchi  Cardiovascular: AICD in place, normal S1S2, no MRG  Gastrointestinal: mildly distended, soft, non-tender, +BS, +PEG tube  Extremities: hyperpigmented skin on anterior shin RLE, trace dependent edema   Musculoskeletal: increased rigidity in UE, atrophied musculature in b/l LE  Vascular: 1+ peripheral pulses  Neurological: intermittently able to nod head to questions and following commands, moving all extremities spontaneously, no focal deficits  Skin: Unstageable sacral ulcer - dressing overlying, stained with blood. When dressing removed, it is an open, crater-like ulceration.    MEDICATIONS:  MEDICATIONS  (STANDING):  acetaminophen    Suspension. 650 milliGRAM(s) Oral every 8 hours  acetylcysteine 20% Inhalation 4 milliLiter(s) Inhalation two times a day  aspirin  chewable 81 milliGRAM(s) Oral daily  atorvastatin 80 milliGRAM(s) Oral at bedtime  calcitriol  Solution 0.25 MICROGram(s) Oral daily  collagenase Ointment 1 Application(s) Topical daily  Dakins Solution - 1/4 Strength 1 Application(s) Topical daily  dextrose 5%. 1000 milliLiter(s) (50 mL/Hr) IV Continuous <Continuous>  dextrose 50% Injectable 12.5 Gram(s) IV Push once  dextrose 50% Injectable 25 Gram(s) IV Push once  dextrose 50% Injectable 25 Gram(s) IV Push once  escitalopram 5 milliGRAM(s) Oral daily  fluconAZOLE IVPB 200 milliGRAM(s) IV Intermittent every 24 hours  folic acid 1 milliGRAM(s) Oral daily  hydrocortisone 10 milliGRAM(s) Oral every 12 hours  insulin regular  human corrective regimen sliding scale   SubCutaneous <User Schedule>  insulin regular  human recombinant 13 Unit(s) SubCutaneous every 6 hours  levETIRAcetam  Solution 500 milliGRAM(s) Oral every 24 hours  metoclopramide 5 milliGRAM(s) Oral every 12 hours  midodrine 10 milliGRAM(s) Oral every 8 hours  modafinil 100 milliGRAM(s) Oral daily  multivitamin 1 Tablet(s) Oral daily  piperacillin/tazobactam IVPB. 2.25 Gram(s) IV Intermittent every 8 hours    MEDICATIONS  (PRN):  dextrose Gel 1 Dose(s) Oral once PRN Blood Glucose LESS THAN 70 milliGRAM(s)/deciliter  glucagon  Injectable 1 milliGRAM(s) IntraMuscular once PRN Glucose LESS THAN 70 milligrams/deciliter      ALLERGIES:  No Known Allergies      LABS:             PT/INR - ( 13 Jun 2018 07:30 )   PT: 30.5 sec;   INR: 2.69       RADIOLOGY & ADDITIONAL TESTS: Reviewed. PGY1 PROGRESS NOTE:    CC: Patient is a 63y old  Male who presents with a chief complaint of Septic Shock due to bilateral cellulitis - course complicated by renal failure (on hemodialysis), stroke, and respiratory failure. (01 Jun 2018 14:51)    OVERNIGHT EVENTS: ELSIE    SUBJECTIVE / INTERVAL HPI: Patient seen and examined at bedside. Patient not following commands this morning. Seen moving all extremities and grimacing to tactile and verbal stimulus. Unable to assess ROS.    ROS: negative unless otherwise specified    VITAL SIGNS:  Vital Signs Last 24 Hrs  T(C): 37.2 (13 Jun 2018 06:04), Max: 37.7 (12 Jun 2018 20:50)  T(F): 98.9 (13 Jun 2018 06:04), Max: 99.8 (12 Jun 2018 20:50)  HR: 92 (13 Jun 2018 06:04) (69 - 107)  BP: 91/54 (13 Jun 2018 06:04) (86/51 - 91/54)  RR: 18 (13 Jun 2018 06:04) (18 - 29)  SpO2: 100% (13 Jun 2018 06:04) (99% - 100%)    PHYSICAL EXAM:  Constitutional: thin, cachectic, chromically ill appearing, tracheostomy on ventilator, NAD    HEENT: temporal wasting, PERRLA, EOMI, dry blood on lips and oral mucosa, dry MM  Neck: No JVD, tracheostomy with minimal mucoid discharge  Respiratory: course breath sounds b/l, CTA b/l, no wheeze, rales, rhonchi  Cardiovascular: AICD in place, normal S1S2, no MRG  Gastrointestinal: mildly distended, soft, non-tender, +BS, +PEG tube, +rectal tube  Extremities: hyperpigmented skin on anterior shin RLE, trace dependent edema   Musculoskeletal: increased rigidity in UE, atrophied musculature in b/l LE  Vascular: 1+ peripheral pulses  Neurological: intermittently able to nod head to questions and following commands, moving all extremities spontaneously, no focal deficits  Skin: Unstageable sacral ulcer - dressing overlying, stained with blood. When dressing removed, it is an open, crater-like ulceration.    MEDICATIONS:  MEDICATIONS  (STANDING):  acetaminophen    Suspension. 650 milliGRAM(s) Oral every 8 hours  acetylcysteine 20% Inhalation 4 milliLiter(s) Inhalation two times a day  aspirin  chewable 81 milliGRAM(s) Oral daily  atorvastatin 80 milliGRAM(s) Oral at bedtime  calcitriol  Solution 0.25 MICROGram(s) Oral daily  collagenase Ointment 1 Application(s) Topical daily  Dakins Solution - 1/4 Strength 1 Application(s) Topical daily  dextrose 5%. 1000 milliLiter(s) (50 mL/Hr) IV Continuous <Continuous>  dextrose 50% Injectable 12.5 Gram(s) IV Push once  dextrose 50% Injectable 25 Gram(s) IV Push once  dextrose 50% Injectable 25 Gram(s) IV Push once  escitalopram 5 milliGRAM(s) Oral daily  fluconAZOLE IVPB 200 milliGRAM(s) IV Intermittent every 24 hours  folic acid 1 milliGRAM(s) Oral daily  hydrocortisone 10 milliGRAM(s) Oral every 12 hours  insulin regular  human corrective regimen sliding scale   SubCutaneous <User Schedule>  insulin regular  human recombinant 13 Unit(s) SubCutaneous every 6 hours  levETIRAcetam  Solution 500 milliGRAM(s) Oral every 24 hours  metoclopramide 5 milliGRAM(s) Oral every 12 hours  midodrine 10 milliGRAM(s) Oral every 8 hours  modafinil 100 milliGRAM(s) Oral daily  multivitamin 1 Tablet(s) Oral daily  piperacillin/tazobactam IVPB. 2.25 Gram(s) IV Intermittent every 8 hours    MEDICATIONS  (PRN):  dextrose Gel 1 Dose(s) Oral once PRN Blood Glucose LESS THAN 70 milliGRAM(s)/deciliter  glucagon  Injectable 1 milliGRAM(s) IntraMuscular once PRN Glucose LESS THAN 70 milligrams/deciliter      ALLERGIES:  No Known Allergies      LABS:             PT/INR - ( 13 Jun 2018 07:30 )   PT: 30.5 sec;   INR: 2.69       RADIOLOGY & ADDITIONAL TESTS: Reviewed. PGY1 PROGRESS NOTE:    CC: Patient is a 63y old  Male who presents with a chief complaint of Septic Shock due to bilateral cellulitis - course complicated by renal failure (on hemodialysis), stroke, and respiratory failure. (01 Jun 2018 14:51)    OVERNIGHT EVENTS: ELSIE    SUBJECTIVE / INTERVAL HPI: Patient seen and examined at bedside. Patient not following commands this morning. Seen moving all extremities and grimacing to tactile and verbal stimulus. Unable to assess ROS.    ROS: negative unless otherwise specified    VITAL SIGNS:  Vital Signs Last 24 Hrs  T(C): 37.2 (13 Jun 2018 06:04), Max: 37.7 (12 Jun 2018 20:50)  T(F): 98.9 (13 Jun 2018 06:04), Max: 99.8 (12 Jun 2018 20:50)  HR: 92 (13 Jun 2018 06:04) (69 - 107)  BP: 91/54 (13 Jun 2018 06:04) (86/51 - 91/54)  RR: 18 (13 Jun 2018 06:04) (18 - 29)  SpO2: 100% (13 Jun 2018 06:04) (99% - 100%)    PHYSICAL EXAM:  Constitutional: thin, cachectic, chromically ill appearing, tracheostomy on ventilator, NAD    HEENT: temporal wasting, PERRLA, EOMI, dry blood on lips and oral mucosa, dry MM  Neck: No JVD, tracheostomy with minimal mucoid discharge  Respiratory: course breath sounds b/l, CTA b/l, no wheeze, rales, rhonchi  Cardiovascular: AICD in place, normal S1S2, no MRG  Gastrointestinal: mildly distended, soft, non-tender, +BS, +PEG tube, +rectal tube  Extremities: hyperpigmented skin on anterior shin RLE, trace dependent edema   Musculoskeletal: increased rigidity in UE, atrophied musculature in b/l LE  Vascular: 1+ peripheral pulses  Neurological: intermittently able to nod head to questions and following commands, moving all extremities spontaneously, no focal deficits  Skin: Unstageable sacral ulcer - dressing overlying, stained with blood. When dressing removed, it is an open, crater-like ulceration. Perspiration on chest.    MEDICATIONS:  MEDICATIONS  (STANDING):  acetaminophen    Suspension. 650 milliGRAM(s) Oral every 8 hours  acetylcysteine 20% Inhalation 4 milliLiter(s) Inhalation two times a day  aspirin  chewable 81 milliGRAM(s) Oral daily  atorvastatin 80 milliGRAM(s) Oral at bedtime  calcitriol  Solution 0.25 MICROGram(s) Oral daily  collagenase Ointment 1 Application(s) Topical daily  Dakins Solution - 1/4 Strength 1 Application(s) Topical daily  dextrose 5%. 1000 milliLiter(s) (50 mL/Hr) IV Continuous <Continuous>  dextrose 50% Injectable 12.5 Gram(s) IV Push once  dextrose 50% Injectable 25 Gram(s) IV Push once  dextrose 50% Injectable 25 Gram(s) IV Push once  escitalopram 5 milliGRAM(s) Oral daily  fluconAZOLE IVPB 200 milliGRAM(s) IV Intermittent every 24 hours  folic acid 1 milliGRAM(s) Oral daily  hydrocortisone 10 milliGRAM(s) Oral every 12 hours  insulin regular  human corrective regimen sliding scale   SubCutaneous <User Schedule>  insulin regular  human recombinant 13 Unit(s) SubCutaneous every 6 hours  levETIRAcetam  Solution 500 milliGRAM(s) Oral every 24 hours  metoclopramide 5 milliGRAM(s) Oral every 12 hours  midodrine 10 milliGRAM(s) Oral every 8 hours  modafinil 100 milliGRAM(s) Oral daily  multivitamin 1 Tablet(s) Oral daily  piperacillin/tazobactam IVPB. 2.25 Gram(s) IV Intermittent every 8 hours    MEDICATIONS  (PRN):  dextrose Gel 1 Dose(s) Oral once PRN Blood Glucose LESS THAN 70 milliGRAM(s)/deciliter  glucagon  Injectable 1 milliGRAM(s) IntraMuscular once PRN Glucose LESS THAN 70 milligrams/deciliter      ALLERGIES:  No Known Allergies      LABS:             PT/INR - ( 13 Jun 2018 07:30 )   PT: 30.5 sec;   INR: 2.69       RADIOLOGY & ADDITIONAL TESTS: Reviewed.

## 2018-06-13 NOTE — PROGRESS NOTE ADULT - PROBLEM SELECTOR PLAN 9
CHF with EF 10-15% 2/2 ischemic cardiomyopathy s/p AICD. Patient with persistent pleural effusions on CXR and US and b/l dependent edema.  - HOLDING ACE/BB in setting of hypotension  - c/w midodrine TID  - c/w HD to maintain fluid balance    #CAD- Hx of MI and ischemic CM s/p AICD, STEMI 7/2017.  - c/w aspirin 81 and Atorvastatin 80mg qhs

## 2018-06-14 LAB
ANION GAP SERPL CALC-SCNC: 20 MMOL/L — HIGH (ref 5–17)
BUN SERPL-MCNC: 69 MG/DL — HIGH (ref 7–23)
CALCIUM SERPL-MCNC: 9.8 MG/DL — SIGNIFICANT CHANGE UP (ref 8.4–10.5)
CHLORIDE SERPL-SCNC: 99 MMOL/L — SIGNIFICANT CHANGE UP (ref 96–108)
CO2 SERPL-SCNC: 27 MMOL/L — SIGNIFICANT CHANGE UP (ref 22–31)
CREAT SERPL-MCNC: 4.12 MG/DL — HIGH (ref 0.5–1.3)
GLUCOSE BLDC GLUCOMTR-MCNC: 227 MG/DL — HIGH (ref 70–99)
GLUCOSE BLDC GLUCOMTR-MCNC: 246 MG/DL — HIGH (ref 70–99)
GLUCOSE BLDC GLUCOMTR-MCNC: 256 MG/DL — HIGH (ref 70–99)
GLUCOSE BLDC GLUCOMTR-MCNC: 326 MG/DL — HIGH (ref 70–99)
GLUCOSE BLDC GLUCOMTR-MCNC: 355 MG/DL — HIGH (ref 70–99)
GLUCOSE SERPL-MCNC: 268 MG/DL — HIGH (ref 70–99)
HCT VFR BLD CALC: 31.2 % — LOW (ref 39–50)
HGB BLD-MCNC: 9.3 G/DL — LOW (ref 13–17)
INR BLD: 3.25 — HIGH (ref 0.88–1.16)
MCHC RBC-ENTMCNC: 26.4 PG — LOW (ref 27–34)
MCHC RBC-ENTMCNC: 29.8 G/DL — LOW (ref 32–36)
MCV RBC AUTO: 88.6 FL — SIGNIFICANT CHANGE UP (ref 80–100)
PLATELET # BLD AUTO: 330 K/UL — SIGNIFICANT CHANGE UP (ref 150–400)
POTASSIUM SERPL-MCNC: 5.2 MMOL/L — SIGNIFICANT CHANGE UP (ref 3.5–5.3)
POTASSIUM SERPL-SCNC: 5.2 MMOL/L — SIGNIFICANT CHANGE UP (ref 3.5–5.3)
PROTHROM AB SERPL-ACNC: 37 SEC — HIGH (ref 9.8–12.7)
R RICKETTSI AB SER-ACNC: NEGATIVE — SIGNIFICANT CHANGE UP
R RICKETTSI IGM SER-ACNC: 0.34 INDEX — SIGNIFICANT CHANGE UP (ref 0–0.89)
RBC # BLD: 3.52 M/UL — LOW (ref 4.2–5.8)
RBC # FLD: 19 % — HIGH (ref 10.3–16.9)
RICK SF IGG TITR SER IF: NEGATIVE — SIGNIFICANT CHANGE UP
RICK SF IGM TITR SER IF: 0.34 INDEX — SIGNIFICANT CHANGE UP (ref 0–0.89)
SODIUM SERPL-SCNC: 146 MMOL/L — HIGH (ref 135–145)
WBC # BLD: 25.8 K/UL — HIGH (ref 3.8–10.5)
WBC # FLD AUTO: 25.8 K/UL — HIGH (ref 3.8–10.5)

## 2018-06-14 PROCEDURE — 99232 SBSQ HOSP IP/OBS MODERATE 35: CPT

## 2018-06-14 PROCEDURE — 90937 HEMODIALYSIS REPEATED EVAL: CPT | Mod: GC

## 2018-06-14 RX ORDER — ERYTHROPOIETIN 10000 [IU]/ML
10000 INJECTION, SOLUTION INTRAVENOUS; SUBCUTANEOUS ONCE
Qty: 0 | Refills: 0 | Status: COMPLETED | OUTPATIENT
Start: 2018-06-14 | End: 2018-06-14

## 2018-06-14 RX ORDER — DOXERCALCIFEROL 2.5 UG/1
2 CAPSULE ORAL ONCE
Qty: 0 | Refills: 0 | Status: COMPLETED | OUTPATIENT
Start: 2018-06-14 | End: 2018-06-14

## 2018-06-14 RX ORDER — INSULIN HUMAN 100 [IU]/ML
15 INJECTION, SOLUTION SUBCUTANEOUS EVERY 6 HOURS
Qty: 0 | Refills: 0 | Status: DISCONTINUED | OUTPATIENT
Start: 2018-06-14 | End: 2018-06-15

## 2018-06-14 RX ORDER — SODIUM CHLORIDE 9 MG/ML
500 INJECTION INTRAMUSCULAR; INTRAVENOUS; SUBCUTANEOUS ONCE
Qty: 0 | Refills: 0 | Status: COMPLETED | OUTPATIENT
Start: 2018-06-14 | End: 2018-06-14

## 2018-06-14 RX ORDER — SODIUM CHLORIDE 9 MG/ML
250 INJECTION INTRAMUSCULAR; INTRAVENOUS; SUBCUTANEOUS ONCE
Qty: 0 | Refills: 0 | Status: COMPLETED | OUTPATIENT
Start: 2018-06-14 | End: 2018-06-14

## 2018-06-14 RX ORDER — ALBUMIN HUMAN 25 %
50 VIAL (ML) INTRAVENOUS ONCE
Qty: 0 | Refills: 0 | Status: DISCONTINUED | OUTPATIENT
Start: 2018-06-14 | End: 2018-06-14

## 2018-06-14 RX ADMIN — Medication 1 TABLET(S): at 11:17

## 2018-06-14 RX ADMIN — PIPERACILLIN AND TAZOBACTAM 200 GRAM(S): 4; .5 INJECTION, POWDER, LYOPHILIZED, FOR SOLUTION INTRAVENOUS at 16:46

## 2018-06-14 RX ADMIN — Medication 10 MILLIGRAM(S): at 14:08

## 2018-06-14 RX ADMIN — MODAFINIL 100 MILLIGRAM(S): 200 TABLET ORAL at 11:17

## 2018-06-14 RX ADMIN — Medication 650 MILLIGRAM(S): at 23:23

## 2018-06-14 RX ADMIN — INSULIN HUMAN 15 UNIT(S): 100 INJECTION, SOLUTION SUBCUTANEOUS at 18:15

## 2018-06-14 RX ADMIN — INSULIN HUMAN 13 UNIT(S): 100 INJECTION, SOLUTION SUBCUTANEOUS at 06:40

## 2018-06-14 RX ADMIN — Medication 650 MILLIGRAM(S): at 14:07

## 2018-06-14 RX ADMIN — ESCITALOPRAM OXALATE 5 MILLIGRAM(S): 10 TABLET, FILM COATED ORAL at 11:17

## 2018-06-14 RX ADMIN — Medication 4 MILLILITER(S): at 06:22

## 2018-06-14 RX ADMIN — Medication 81 MILLIGRAM(S): at 11:17

## 2018-06-14 RX ADMIN — CALCITRIOL 0.25 MICROGRAM(S): 0.5 CAPSULE ORAL at 11:18

## 2018-06-14 RX ADMIN — LEVETIRACETAM 500 MILLIGRAM(S): 250 TABLET, FILM COATED ORAL at 14:08

## 2018-06-14 RX ADMIN — MIDODRINE HYDROCHLORIDE 10 MILLIGRAM(S): 2.5 TABLET ORAL at 06:22

## 2018-06-14 RX ADMIN — ATORVASTATIN CALCIUM 80 MILLIGRAM(S): 80 TABLET, FILM COATED ORAL at 23:24

## 2018-06-14 RX ADMIN — Medication 5 MILLIGRAM(S): at 19:08

## 2018-06-14 RX ADMIN — Medication 10 MILLIGRAM(S): at 01:20

## 2018-06-14 RX ADMIN — Medication 650 MILLIGRAM(S): at 23:53

## 2018-06-14 RX ADMIN — Medication 1 APPLICATION(S): at 14:51

## 2018-06-14 RX ADMIN — Medication 650 MILLIGRAM(S): at 15:26

## 2018-06-14 RX ADMIN — INSULIN HUMAN 13 UNIT(S): 100 INJECTION, SOLUTION SUBCUTANEOUS at 01:19

## 2018-06-14 RX ADMIN — Medication 5 MILLIGRAM(S): at 06:22

## 2018-06-14 RX ADMIN — INSULIN HUMAN 3: 100 INJECTION, SOLUTION SUBCUTANEOUS at 12:28

## 2018-06-14 RX ADMIN — DOXERCALCIFEROL 2 MICROGRAM(S): 2.5 CAPSULE ORAL at 12:45

## 2018-06-14 RX ADMIN — ERYTHROPOIETIN 10000 UNIT(S): 10000 INJECTION, SOLUTION INTRAVENOUS; SUBCUTANEOUS at 12:45

## 2018-06-14 RX ADMIN — MIDODRINE HYDROCHLORIDE 10 MILLIGRAM(S): 2.5 TABLET ORAL at 23:24

## 2018-06-14 RX ADMIN — MIDODRINE HYDROCHLORIDE 10 MILLIGRAM(S): 2.5 TABLET ORAL at 14:08

## 2018-06-14 RX ADMIN — INSULIN HUMAN 5: 100 INJECTION, SOLUTION SUBCUTANEOUS at 01:19

## 2018-06-14 RX ADMIN — Medication 650 MILLIGRAM(S): at 06:24

## 2018-06-14 RX ADMIN — SODIUM CHLORIDE 1000 MILLILITER(S): 9 INJECTION INTRAMUSCULAR; INTRAVENOUS; SUBCUTANEOUS at 16:40

## 2018-06-14 RX ADMIN — INSULIN HUMAN 4: 100 INJECTION, SOLUTION SUBCUTANEOUS at 06:40

## 2018-06-14 RX ADMIN — FLUCONAZOLE 100 MILLIGRAM(S): 150 TABLET ORAL at 14:51

## 2018-06-14 RX ADMIN — Medication 1 MILLIGRAM(S): at 11:17

## 2018-06-14 RX ADMIN — INSULIN HUMAN 2: 100 INJECTION, SOLUTION SUBCUTANEOUS at 18:15

## 2018-06-14 RX ADMIN — Medication 4 MILLILITER(S): at 19:09

## 2018-06-14 RX ADMIN — INSULIN HUMAN 15 UNIT(S): 100 INJECTION, SOLUTION SUBCUTANEOUS at 12:29

## 2018-06-14 RX ADMIN — PIPERACILLIN AND TAZOBACTAM 200 GRAM(S): 4; .5 INJECTION, POWDER, LYOPHILIZED, FOR SOLUTION INTRAVENOUS at 06:22

## 2018-06-14 RX ADMIN — SODIUM CHLORIDE 1000 MILLILITER(S): 9 INJECTION INTRAMUSCULAR; INTRAVENOUS; SUBCUTANEOUS at 16:10

## 2018-06-14 NOTE — PROGRESS NOTE ADULT - SUBJECTIVE AND OBJECTIVE BOX
Patient seen and examined at bedside.  Patient is a 63 year old male with a history of HFrEF 10-15% due to ischemic cardiomyopathy, s/p AICD, ?atrial fibrillation, hypertension, diabetes mellitus type 2, gout, and CKD for whom nephrology was called for GILMER from ATN requiring hemodialysis. Patient appears comfortable and in no distress. Patient was last dialyzed 6/12 with UF of 2.5L.     acetaminophen    Suspension. 650 milliGRAM(s) every 8 hours  acetylcysteine 20% Inhalation 4 milliLiter(s) two times a day  aspirin  chewable 81 milliGRAM(s) daily  atorvastatin 80 milliGRAM(s) at bedtime  calcitriol  Solution 0.25 MICROGram(s) daily  collagenase Ointment 1 Application(s) daily  Dakins Solution - 1/4 Strength 1 Application(s) daily  dextrose 5%. 1000 milliLiter(s) <Continuous>  dextrose 50% Injectable 12.5 Gram(s) once  dextrose 50% Injectable 25 Gram(s) once  dextrose 50% Injectable 25 Gram(s) once  dextrose Gel 1 Dose(s) once PRN  doxercalciferol Injectable 2 MICROGram(s) once  epoetin amy Injectable 62022 Unit(s) once  escitalopram 5 milliGRAM(s) daily  fluconAZOLE IVPB 200 milliGRAM(s) every 24 hours  folic acid 1 milliGRAM(s) daily  glucagon  Injectable 1 milliGRAM(s) once PRN  hydrocortisone 10 milliGRAM(s) every 12 hours  insulin regular  human corrective regimen sliding scale   <User Schedule>  insulin regular  human recombinant 15 Unit(s) every 6 hours  levETIRAcetam  Solution 500 milliGRAM(s) every 24 hours  metoclopramide 5 milliGRAM(s) every 12 hours  midodrine 10 milliGRAM(s) every 8 hours  modafinil 100 milliGRAM(s) daily  multivitamin 1 Tablet(s) daily  piperacillin/tazobactam IVPB. 2.25 Gram(s) every 8 hours    Allergies    No Known Allergies    Intolerances    T(C): , Max: 37.6 (06-14-18 @ 11:10)  T(F): , Max: 99.7 (06-14-18 @ 11:10)  HR: 95 (06-14-18 @ 11:10)  BP: 83/67 (06-14-18 @ 11:10)  RR: 21 (06-14-18 @ 11:10)  SpO2: 100% (06-14-18 @ 11:10)    06-14 @ 07:01  -  06-14 @ 11:55  --------------------------------------------------------  IN:    Jevity: 186 mL  Total IN: 186 mL    OUT:  Total OUT: 0 mL    Total NET: 186 mL    LABS:    PT/INR - ( 14 Jun 2018 03:06 )   PT: 37.0 sec;   INR: 3.25

## 2018-06-14 NOTE — PROGRESS NOTE ADULT - SUBJECTIVE AND OBJECTIVE BOX
PGY1 PROGRESS NOTE:    CC: Patient is a 63y old  Male who presents with a chief complaint of Septic Shock due to bilateral cellulitis - course complicated by renal failure (on hemodialysis), stroke, and respiratory failure. (01 Jun 2018 14:51)    OVERNIGHT EVENTS: ELSIE    SUBJECTIVE / INTERVAL HPI: Patient seen and examined at bedside. Patient     ROS: negative unless otherwise specified    VITAL SIGNS:  T(F): 99.5 (14 Jun 2018 05:49), Max: 99.5 (14 Jun 2018 05:49)  HR: 97 (14 Jun 2018 05:49) (81 - 99)  BP: 87/59 (14 Jun 2018 05:49) (87/59 - 94/65)  RR: 16 (14 Jun 2018 05:49) (16 - 20)  SpO2: 100% (14 Jun 2018 05:49) (99% - 100%)    PHYSICAL EXAM:  Constitutional: thin, cachectic, chromically ill appearing, tracheostomy on ventilator, NAD    HEENT: temporal wasting, PERRLA, EOMI, dry blood on lips and oral mucosa, dry MM  Neck: No JVD, tracheostomy with minimal mucoid discharge  Respiratory: course breath sounds b/l, CTA b/l, no wheeze, rales, rhonchi  Cardiovascular: AICD in place, normal S1S2, no MRG  Gastrointestinal: mildly distended, soft, non-tender, +BS, +PEG tube, +rectal tube  Extremities: hyperpigmented skin on anterior shin RLE, trace dependent edema   Musculoskeletal: increased rigidity in UE, atrophied musculature in b/l LE  Vascular: 1+ peripheral pulses  Neurological: intermittently able to nod head to questions and following commands, moving all extremities spontaneously, no focal deficits  Skin: Unstageable sacral ulcer - dressing overlying, stained with blood. When dressing removed, it is an open, crater-like ulceration. Perspiration on chest.    MEDICATIONS:  MEDICATIONS  (STANDING):  acetaminophen    Suspension. 650 milliGRAM(s) Oral every 8 hours  acetylcysteine 20% Inhalation 4 milliLiter(s) Inhalation two times a day  aspirin  chewable 81 milliGRAM(s) Oral daily  atorvastatin 80 milliGRAM(s) Oral at bedtime  calcitriol  Solution 0.25 MICROGram(s) Oral daily  collagenase Ointment 1 Application(s) Topical daily  Dakins Solution - 1/4 Strength 1 Application(s) Topical daily  dextrose 5%. 1000 milliLiter(s) (50 mL/Hr) IV Continuous <Continuous>  dextrose 50% Injectable 12.5 Gram(s) IV Push once  dextrose 50% Injectable 25 Gram(s) IV Push once  dextrose 50% Injectable 25 Gram(s) IV Push once  escitalopram 5 milliGRAM(s) Oral daily  fluconAZOLE IVPB 200 milliGRAM(s) IV Intermittent every 24 hours  folic acid 1 milliGRAM(s) Oral daily  hydrocortisone 10 milliGRAM(s) Oral every 12 hours  insulin regular  human corrective regimen sliding scale   SubCutaneous <User Schedule>  insulin regular  human recombinant 13 Unit(s) SubCutaneous every 6 hours  levETIRAcetam  Solution 500 milliGRAM(s) Oral every 24 hours  metoclopramide 5 milliGRAM(s) Oral every 12 hours  midodrine 10 milliGRAM(s) Oral every 8 hours  modafinil 100 milliGRAM(s) Oral daily  multivitamin 1 Tablet(s) Oral daily  piperacillin/tazobactam IVPB. 2.25 Gram(s) IV Intermittent every 8 hours    MEDICATIONS  (PRN):  dextrose Gel 1 Dose(s) Oral once PRN Blood Glucose LESS THAN 70 milliGRAM(s)/deciliter  glucagon  Injectable 1 milliGRAM(s) IntraMuscular once PRN Glucose LESS THAN 70 milligrams/deciliter      ALLERGIES:  No Known Allergies      LABS:             PT/INR - ( 14 Jun 2018 03:06 )   PT: 37.0 sec;   INR: 3.25       RADIOLOGY & ADDITIONAL TESTS: Reviewed.

## 2018-06-14 NOTE — PROGRESS NOTE ADULT - PROBLEM SELECTOR PLAN 4
Stable. Persistently hypotensive throughout admission, now on midodrine 15 mg TID and hydrocortisone 10mg BID for concern of adrenal insufficiency. Has intermittently used albumin to tolerate dialysis.   - c/w Midodrine 10 mg q8h and additional 10 midodrine pre-HD  - hydrocortisone 10mg BID (tapered to this dose on 5/29)  - albumin during HD if necessary to maintain MAP>60

## 2018-06-14 NOTE — PROGRESS NOTE ADULT - PROBLEM SELECTOR PLAN 10
VTE: Coumadin dosed nightly, with INR daily checks, goal 2-3  GI PPx: PPI  DNR- Made DNR, family wants escalation of care, pressors if needed.  F: No IVF in setting of HD  E: Replete electrolytes with caution in setting of HD  N: Jevity 1.5 goal rate 62cc/hr  Dispo: ongoing d/w family regarding treatment goals, placement, antibiotic therapy

## 2018-06-14 NOTE — PROGRESS NOTE ADULT - PROBLEM SELECTOR PLAN 1
Patient is a 63 year old male with acute renal failure from ischemic ATN now requiring hemodialysis. Patient was last dialyzed on 6/12 with UF of 2.5L     P - Dialysis today   Revaclear 400, , , 2K bath   Goal UF 2.5 kg over 3 hours

## 2018-06-14 NOTE — PROGRESS NOTE ADULT - ATTENDING COMMENTS
Pt. seen and examined by me earlier today; I have read Dr. Juárez's note, I agree w/ her findings and plan of care as documented.

## 2018-06-14 NOTE — PROGRESS NOTE ADULT - PROBLEM SELECTOR PLAN 7
Hx of AF and LV thrombus on Coumadin prior to admission. Most recent TTE with definity shows no LV thrombus currently.   - Not currently on rate control as HR has remained   - Lopressor pushes for HR goal <110  - dose Coumadin daily based on INR, will consider qod INR checks with HD to minimize lab draws    #LV thrombus- LV thrombus noted on RUKHSANA on 4/10. Remains therapeutic on warfarin.

## 2018-06-14 NOTE — PROGRESS NOTE ADULT - PROBLEM SELECTOR PLAN 6
ARF likely 2/2 to ischemic ATN with hypoperfusion in setting of shock this admission. Now on HD through R sided Permacath.   - currently being considered for AVF placement pending medical optimization for surgical procedure. Patient is currently not medically optimized and requires continued rehabilitation prior to surgical consideration.  - c/w intermittent HD  - avoid nephrotoxic agents  - renal following

## 2018-06-14 NOTE — PROGRESS NOTE ADULT - SUBJECTIVE AND OBJECTIVE BOX
Patient was seen and evaluated on dialysis.   Patient is tolerating the procedure well.   HR: 95 (06-14-18 @ 11:10)  BP: 83/67 (06-14-18 @ 11:10)  Continue dialysis:   Revaclear 400, , , 2K bath   Goal UF 2.5 kg over 3 hours.

## 2018-06-14 NOTE — PROGRESS NOTE ADULT - PROBLEM SELECTOR PLAN 1
#Sepsis- Resolved.   - c/w treatment for sacral decub and UTI as below  - f/u bcxs- NGTD- and fungal cxs    #Sacral decubitus ulcer- unstageable s/p debridement with plastic surgery. Cultures growing multiple organisms including Pseudomonas and E faecium, now on Zosyn per sensitivities. Pelvic MRI showing acute osteomyelitis and loculated collection abutting the bladder. Patient is not a surgical candidate based on his comorbidities and inability lay in prone positioning postop ([id number]). At this time, will not evaluate possible abscess further - will likely recur given poor prognosis.  - c/w Zosyn (6/4-), will d/w family about continuation of abx therapy given that benefit/risk may favor discontinuation to avoid resistance given that ulcer is uncurable   - frequent turning q2h for pressure offloading, air fluid mattress  - wound care with dry dressings with Allevyn over top  - soilage control with rectal tube, benefits outweigh risks at this point   - Nutrition support  - Bcxs NGTD, will f/u Fungal Bcxs    #UTI- Patient with urinary retention requiring chronic indwelling Jose catheter. Urine cultures growing Sherrie Dubliniensis sensitive to Fluconazole.  - C/w Fluconazole 200mg (6/7- 6/21) for 2 week course per ID

## 2018-06-14 NOTE — PROGRESS NOTE ADULT - PROBLEM SELECTOR PLAN 2
Acute respiratory failure in setting of septic and cardiogenic shock, with trach placed on 4/5/18 for persistent, now chronic respiratory failure.   - Intermittent tachypnea likely a component of pain, Dilaudid 0.25mg IV PRN  - ENT to replace trach before patient is accepted to [facility]

## 2018-06-15 LAB
GLUCOSE BLDC GLUCOMTR-MCNC: 124 MG/DL — HIGH (ref 70–99)
GLUCOSE BLDC GLUCOMTR-MCNC: 140 MG/DL — HIGH (ref 70–99)
GLUCOSE BLDC GLUCOMTR-MCNC: 286 MG/DL — HIGH (ref 70–99)
GLUCOSE BLDC GLUCOMTR-MCNC: 314 MG/DL — HIGH (ref 70–99)
GLUCOSE BLDC GLUCOMTR-MCNC: 325 MG/DL — HIGH (ref 70–99)
GLUCOSE BLDC GLUCOMTR-MCNC: 402 MG/DL — HIGH (ref 70–99)
GLUCOSE BLDC GLUCOMTR-MCNC: 433 MG/DL — HIGH (ref 70–99)
GLUCOSE BLDC GLUCOMTR-MCNC: 438 MG/DL — HIGH (ref 70–99)
INR BLD: 3.63 — HIGH (ref 0.88–1.16)
LACTATE SERPL-SCNC: 1.5 MMOL/L — SIGNIFICANT CHANGE UP (ref 0.5–2)
MAGNESIUM SERPL-MCNC: 2.4 MG/DL — SIGNIFICANT CHANGE UP (ref 1.6–2.6)
PROTHROM AB SERPL-ACNC: 41.4 SEC — HIGH (ref 9.8–12.7)

## 2018-06-15 PROCEDURE — 99232 SBSQ HOSP IP/OBS MODERATE 35: CPT | Mod: GC

## 2018-06-15 PROCEDURE — 99233 SBSQ HOSP IP/OBS HIGH 50: CPT

## 2018-06-15 RX ORDER — INSULIN GLARGINE 100 [IU]/ML
14 INJECTION, SOLUTION SUBCUTANEOUS AT BEDTIME
Qty: 0 | Refills: 0 | Status: DISCONTINUED | OUTPATIENT
Start: 2018-06-15 | End: 2018-06-15

## 2018-06-15 RX ORDER — INSULIN HUMAN 100 [IU]/ML
6 INJECTION, SOLUTION SUBCUTANEOUS ONCE
Qty: 0 | Refills: 0 | Status: COMPLETED | OUTPATIENT
Start: 2018-06-15 | End: 2018-06-15

## 2018-06-15 RX ORDER — INSULIN HUMAN 100 [IU]/ML
17 INJECTION, SOLUTION SUBCUTANEOUS EVERY 6 HOURS
Qty: 0 | Refills: 0 | Status: DISCONTINUED | OUTPATIENT
Start: 2018-06-15 | End: 2018-06-24

## 2018-06-15 RX ORDER — INSULIN GLARGINE 100 [IU]/ML
7 INJECTION, SOLUTION SUBCUTANEOUS AT BEDTIME
Qty: 0 | Refills: 0 | Status: DISCONTINUED | OUTPATIENT
Start: 2018-06-15 | End: 2018-07-01

## 2018-06-15 RX ADMIN — PIPERACILLIN AND TAZOBACTAM 200 GRAM(S): 4; .5 INJECTION, POWDER, LYOPHILIZED, FOR SOLUTION INTRAVENOUS at 15:42

## 2018-06-15 RX ADMIN — INSULIN HUMAN 4: 100 INJECTION, SOLUTION SUBCUTANEOUS at 06:55

## 2018-06-15 RX ADMIN — MIDODRINE HYDROCHLORIDE 10 MILLIGRAM(S): 2.5 TABLET ORAL at 22:21

## 2018-06-15 RX ADMIN — INSULIN HUMAN 4: 100 INJECTION, SOLUTION SUBCUTANEOUS at 17:55

## 2018-06-15 RX ADMIN — PIPERACILLIN AND TAZOBACTAM 200 GRAM(S): 4; .5 INJECTION, POWDER, LYOPHILIZED, FOR SOLUTION INTRAVENOUS at 07:05

## 2018-06-15 RX ADMIN — Medication 650 MILLIGRAM(S): at 23:00

## 2018-06-15 RX ADMIN — Medication 1 APPLICATION(S): at 12:51

## 2018-06-15 RX ADMIN — INSULIN HUMAN 17 UNIT(S): 100 INJECTION, SOLUTION SUBCUTANEOUS at 12:50

## 2018-06-15 RX ADMIN — CALCITRIOL 0.25 MICROGRAM(S): 0.5 CAPSULE ORAL at 12:50

## 2018-06-15 RX ADMIN — ATORVASTATIN CALCIUM 80 MILLIGRAM(S): 80 TABLET, FILM COATED ORAL at 22:21

## 2018-06-15 RX ADMIN — MODAFINIL 100 MILLIGRAM(S): 200 TABLET ORAL at 12:49

## 2018-06-15 RX ADMIN — INSULIN HUMAN 6 UNIT(S): 100 INJECTION, SOLUTION SUBCUTANEOUS at 15:41

## 2018-06-15 RX ADMIN — Medication 650 MILLIGRAM(S): at 22:21

## 2018-06-15 RX ADMIN — MIDODRINE HYDROCHLORIDE 10 MILLIGRAM(S): 2.5 TABLET ORAL at 13:44

## 2018-06-15 RX ADMIN — INSULIN GLARGINE 7 UNIT(S): 100 INJECTION, SOLUTION SUBCUTANEOUS at 22:36

## 2018-06-15 RX ADMIN — Medication 4 MILLILITER(S): at 06:19

## 2018-06-15 RX ADMIN — Medication 650 MILLIGRAM(S): at 06:19

## 2018-06-15 RX ADMIN — Medication 10 MILLIGRAM(S): at 01:12

## 2018-06-15 RX ADMIN — Medication 650 MILLIGRAM(S): at 13:20

## 2018-06-15 RX ADMIN — Medication 1 MILLIGRAM(S): at 12:50

## 2018-06-15 RX ADMIN — Medication 4 MILLILITER(S): at 17:56

## 2018-06-15 RX ADMIN — INSULIN HUMAN 3: 100 INJECTION, SOLUTION SUBCUTANEOUS at 01:12

## 2018-06-15 RX ADMIN — INSULIN HUMAN 6: 100 INJECTION, SOLUTION SUBCUTANEOUS at 12:50

## 2018-06-15 RX ADMIN — PIPERACILLIN AND TAZOBACTAM 200 GRAM(S): 4; .5 INJECTION, POWDER, LYOPHILIZED, FOR SOLUTION INTRAVENOUS at 01:12

## 2018-06-15 RX ADMIN — Medication 5 MILLIGRAM(S): at 17:56

## 2018-06-15 RX ADMIN — LEVETIRACETAM 500 MILLIGRAM(S): 250 TABLET, FILM COATED ORAL at 13:45

## 2018-06-15 RX ADMIN — Medication 650 MILLIGRAM(S): at 06:49

## 2018-06-15 RX ADMIN — ESCITALOPRAM OXALATE 5 MILLIGRAM(S): 10 TABLET, FILM COATED ORAL at 12:50

## 2018-06-15 RX ADMIN — MIDODRINE HYDROCHLORIDE 10 MILLIGRAM(S): 2.5 TABLET ORAL at 06:20

## 2018-06-15 RX ADMIN — INSULIN HUMAN 17 UNIT(S): 100 INJECTION, SOLUTION SUBCUTANEOUS at 17:55

## 2018-06-15 RX ADMIN — INSULIN HUMAN 15 UNIT(S): 100 INJECTION, SOLUTION SUBCUTANEOUS at 01:13

## 2018-06-15 RX ADMIN — Medication 1 TABLET(S): at 12:49

## 2018-06-15 RX ADMIN — Medication 10 MILLIGRAM(S): at 12:50

## 2018-06-15 RX ADMIN — Medication 5 MILLIGRAM(S): at 06:20

## 2018-06-15 RX ADMIN — Medication 650 MILLIGRAM(S): at 12:50

## 2018-06-15 RX ADMIN — FLUCONAZOLE 100 MILLIGRAM(S): 150 TABLET ORAL at 13:45

## 2018-06-15 RX ADMIN — Medication 81 MILLIGRAM(S): at 12:50

## 2018-06-15 NOTE — PROGRESS NOTE ADULT - PROBLEM SELECTOR PLAN 8
Support provided to patient and family. Patient to have access to supportive services during rest of hospital stay as the patient/family deemed necessary ie. Massage therapy, Music therapy, Patient and family supportive services, etc.    Pts dgt Cristal is available to come to the hospital on Tuesday. the time is tbd.

## 2018-06-15 NOTE — PROGRESS NOTE ADULT - PROBLEM SELECTOR PLAN 1
Patient is a 63 year old male with acute renal failure from ischemic ATN now requiring hemodialysis. Patient was last dialyzed on 6/14 with 1.6 L UF     P - Patient does not require emergent dialysis today. Patient's prognosis is poor. Patient remains hypotensive.    Anticipate next dialysis on 6/16

## 2018-06-15 NOTE — PROGRESS NOTE ADULT - SUBJECTIVE AND OBJECTIVE BOX
Patient seen and examined at bedside. Patient is a 63 year old male with a history of HFrEF 10-15% due to ischemic cardiomyopathy, s/p AICD, ?atrial fibrillation, hypertension, diabetes mellitus type 2, gout, and CKD for whom nephrology was called for GILMER from AT requiring hemodialysis. Patient was last dialyzed 6/14. Patient became hypotensive yesterday after dialysis yesterday and required some fluid back.     acetaminophen    Suspension. 650 milliGRAM(s) every 8 hours  acetylcysteine 20% Inhalation 4 milliLiter(s) two times a day  aspirin  chewable 81 milliGRAM(s) daily  atorvastatin 80 milliGRAM(s) at bedtime  calcitriol  Solution 0.25 MICROGram(s) daily  collagenase Ointment 1 Application(s) daily  Dakins Solution - 1/4 Strength 1 Application(s) daily  dextrose 5%. 1000 milliLiter(s) <Continuous>  dextrose 50% Injectable 12.5 Gram(s) once  dextrose 50% Injectable 25 Gram(s) once  dextrose 50% Injectable 25 Gram(s) once  dextrose Gel 1 Dose(s) once PRN  escitalopram 5 milliGRAM(s) daily  fluconAZOLE IVPB 200 milliGRAM(s) every 24 hours  folic acid 1 milliGRAM(s) daily  glucagon  Injectable 1 milliGRAM(s) once PRN  hydrocortisone 10 milliGRAM(s) every 12 hours  insulin regular  human corrective regimen sliding scale   <User Schedule>  insulin regular  human recombinant 17 Unit(s) every 6 hours  levETIRAcetam  Solution 500 milliGRAM(s) every 24 hours  metoclopramide 5 milliGRAM(s) every 12 hours  midodrine 10 milliGRAM(s) every 8 hours  modafinil 100 milliGRAM(s) daily  multivitamin 1 Tablet(s) daily  piperacillin/tazobactam IVPB. 2.25 Gram(s) every 8 hours    Allergies    No Known Allergies    Intolerances    T(C): , Max: 37.1 (06-15-18 @ 08:45)  T(F): , Max: 98.8 (06-15-18 @ 08:45)  HR: 98 (06-15-18 @ 08:45)  BP: 91/68 (06-15-18 @ 08:45)  RR: 20 (06-15-18 @ 08:45)  SpO2: 100% (06-15-18 @ 09:28)    06-14 @ 07:01  -  06-15 @ 07:00  --------------------------------------------------------  IN:    Enteral Tube Flush: 60 mL    Jevity: 806 mL    Sodium Chloride 0.9% IV Bolus: 750 mL    Solution: 200 mL  Total IN: 1816 mL    OUT:    Other: 1600 mL    Rectal Tube: 50 mL  Total OUT: 1650 mL    Total NET: 166 mL    LABS:                        9.3    25.8  )-----------( 330      ( 14 Jun 2018 12:11 )             31.2     06-14    146<H>  |  99  |  69<H>  ----------------------------<  268<H>  5.2   |  27  |  4.12<H>    Ca    9.8      14 Jun 2018 12:11    PT/INR - ( 15 Gurmeet 2018 08:11 )   PT: 41.4 sec;   INR: 3.63

## 2018-06-15 NOTE — PROGRESS NOTE ADULT - PROBLEM SELECTOR PLAN 6
Failure likely 2/2 to ischemic ATN with hypoperfusion in setting of shock.  Pt will need life long HD

## 2018-06-15 NOTE — PROGRESS NOTE ADULT - SUBJECTIVE AND OBJECTIVE BOX
PGY1 PROGRESS NOTE:    CC: Patient is a 63y old  Male who presents with a chief complaint of Septic Shock due to bilateral cellulitis - course complicated by renal failure (on hemodialysis), stroke, and respiratory failure. (01 Jun 2018 14:51)    OVERNIGHT EVENTS: ELSIE    SUBJECTIVE / INTERVAL HPI: Patient seen and examined at bedside. Patient awakens to voice and able to follow some commands. Unable to assess ROS.    ROS: negative unless otherwise specified    VITAL SIGNS:  T(F): 99.5 (14 Jun 2018 05:49), Max: 99.5 (14 Jun 2018 05:49)  HR: 97 (14 Jun 2018 05:49) (81 - 99)  BP: 87/59 (14 Jun 2018 05:49) (87/59 - 94/65)  RR: 16 (14 Jun 2018 05:49) (16 - 20)  SpO2: 100% (14 Jun 2018 05:49) (99% - 100%)    PHYSICAL EXAM:  Constitutional: thin, cachectic, chromically ill appearing, tracheostomy on ventilator, NAD    HEENT: temporal wasting, PERRLA, EOMI, dry blood on lips and oral mucosa, dry MM  Neck: No JVD, tracheostomy with minimal mucoid discharge  Respiratory: course breath sounds b/l, CTA b/l, no wheeze, rales, rhonchi  Cardiovascular: AICD in place, normal S1S2, no MRG  Gastrointestinal: mildly distended, soft, non-tender, +BS, +PEG tube, +rectal tube  Extremities: hyperpigmented skin on anterior shin RLE, trace dependent edema   Musculoskeletal: increased rigidity in UE, atrophied musculature in b/l LE  Vascular: 1+ peripheral pulses  Neurological: intermittently able to nod head to questions and following commands, moving all extremities spontaneously, no focal deficits  Skin: Unstageable sacral ulcer - dressing overlying, stained with blood. When dressing removed, it is an open, crater-like ulceration. Perspiration on chest.    MEDICATIONS:  MEDICATIONS  (STANDING):  acetaminophen    Suspension. 650 milliGRAM(s) Oral every 8 hours  acetylcysteine 20% Inhalation 4 milliLiter(s) Inhalation two times a day  aspirin  chewable 81 milliGRAM(s) Oral daily  atorvastatin 80 milliGRAM(s) Oral at bedtime  calcitriol  Solution 0.25 MICROGram(s) Oral daily  collagenase Ointment 1 Application(s) Topical daily  Dakins Solution - 1/4 Strength 1 Application(s) Topical daily  dextrose 5%. 1000 milliLiter(s) (50 mL/Hr) IV Continuous <Continuous>  dextrose 50% Injectable 12.5 Gram(s) IV Push once  dextrose 50% Injectable 25 Gram(s) IV Push once  dextrose 50% Injectable 25 Gram(s) IV Push once  escitalopram 5 milliGRAM(s) Oral daily  fluconAZOLE IVPB 200 milliGRAM(s) IV Intermittent every 24 hours  folic acid 1 milliGRAM(s) Oral daily  hydrocortisone 10 milliGRAM(s) Oral every 12 hours  insulin regular  human corrective regimen sliding scale   SubCutaneous <User Schedule>  insulin regular  human recombinant 13 Unit(s) SubCutaneous every 6 hours  levETIRAcetam  Solution 500 milliGRAM(s) Oral every 24 hours  metoclopramide 5 milliGRAM(s) Oral every 12 hours  midodrine 10 milliGRAM(s) Oral every 8 hours  modafinil 100 milliGRAM(s) Oral daily  multivitamin 1 Tablet(s) Oral daily  piperacillin/tazobactam IVPB. 2.25 Gram(s) IV Intermittent every 8 hours    MEDICATIONS  (PRN):  dextrose Gel 1 Dose(s) Oral once PRN Blood Glucose LESS THAN 70 milliGRAM(s)/deciliter  glucagon  Injectable 1 milliGRAM(s) IntraMuscular once PRN Glucose LESS THAN 70 milligrams/deciliter      ALLERGIES:  No Known Allergies      LABS:                                   9.3    25.8  )-----------( 330      ( 14 Jun 2018 12:11 )             31.2     06-14    146<H>  |  99  |  69<H>  ----------------------------<  268<H>  5.2   |  27  |  4.12<H>    Ca    9.8      14 Jun 2018 12:11      PT/INR - ( 14 Jun 2018 03:06 )   PT: 37.0 sec;   INR: 3.25          RADIOLOGY & ADDITIONAL TESTS: Reviewed.

## 2018-06-15 NOTE — PROGRESS NOTE ADULT - PROBLEM SELECTOR PLAN 1
#Sepsis- Resolved.   - c/w treatment for sacral decub and UTI as below  - f/u bcxs- NGTD- and fungal cxs    #Sacral decubitus ulcer- unstageable s/p debridement with plastic surgery. Cultures growing multiple organisms including Pseudomonas and E faecium, now on Zosyn per sensitivities. Pelvic MRI showing acute osteomyelitis and loculated collection abutting the bladder. Patient is not a surgical candidate based on his comorbidities and inability lay in prone positioning postop ([id number]). At this time, will not evaluate possible abscess further - will likely recur given poor prognosis.  - c/w Zosyn (6/4-), will d/w family about continuation of abx therapy given that benefit/risk may favor discontinuation to avoid resistance given that ulcer is uncurable   - frequent turning q2h for pressure offloading, air fluid mattress  - wound care with dry dressings with Allevyn over top  - soilage control with rectal tube, benefits outweigh risks at this point   - Nutrition support  - Bcxs NGTD, will f/u Fungal Bcxs    #UTI- Patient with urinary retention requiring chronic indwelling Jose catheter. Urine cultures growing Sherrie Dubliniensis sensitive to Fluconazole.  - C/w Fluconazole 200mg (6/7- 6/21) for 2 week course per ID

## 2018-06-15 NOTE — PROGRESS NOTE ADULT - ATTENDING COMMENTS
Pt. seen and examined by me earlier today; I have read Dr. Juárez's note, I agree w/ her findings and plan of care as documented; goals of care discussions ongoing, family meeting planned for next week.

## 2018-06-15 NOTE — PROGRESS NOTE ADULT - PROBLEM SELECTOR PLAN 4
Stable. Persistently hypotensive throughout admission, now on midodrine 15 mg TID and hydrocortisone 10mg BID for concern of adrenal insufficiency. Has intermittently used albumin to tolerate dialysis.   - c/w Midodrine 10 mg q8h and additional 10 midodrine pre-HD  - hydrocortisone 10mg BID   - albumin during HD if necessary to maintain MAP>60

## 2018-06-15 NOTE — PROGRESS NOTE ADULT - SUBJECTIVE AND OBJECTIVE BOX
Palliative Medicine  reconsulted on this 63 year old male who has been hospitalized over 3 months.  Pt was admitted with cellulitis in 2018.  His course was complicated by septic shock, cardiogenic shock, renal failure, respiratory failure, multiple infections and cva with seizure activity.  Pt remains mechanically vented, hemodialysis dependant, completely disabled requiring complete care for ADLs.  He is fed enterally through a PEG tube. Pt has developed significant decubitus ulcer which is concerning for osteomyelitis (work up in progress). As pt is fecally incontinent, he continues to contaminate the wound with fecal matter.    Unclear at this point, what is achievable in regard to control of infection from this ulcer as well as steps involved to remediate it and the ongoing use of antibiotics to treat same.  Additionally, pt remains profoundly debilitated with impaired quality of life.    Interval history:  stable. no changes      PAIN (0-10):  yes.    DYSPNEA (Y/N):  no  NAUSEA/VOMITING (Y/N):  no  SECRETIONS (Y/N):  no  AGITATION (Y/N): yes mildly    OTHER REVIEW OF SYSTEMS:  UNABLE TO OBTAIN  due to:  altered mental status    Vital Signs Last 24 Hrs  ICU Vital Signs Last 24 Hrs  T(C): 37.4 (15 Gurmeet 2018 13:30), Max: 37.4 (15 Gurmeet 2018 13:30)  T(F): 99.3 (15 Gurmeet 2018 13:30), Max: 99.3 (15 Gurmeet 2018 13:30)  HR: 98 (15 Gurmeet 2018 13:30) (92 - 100)  BP: 99/51 (15 Gurmeet 2018 13:30) (58/45 - 109/64)  BP(mean): --  ABP: --  ABP(mean): --  RR: 18 (15 Gurmeet 2018 13:30) (18 - 26)  SpO2: 100% (15 Gurmeet 2018 13:30) (98% - 100%)    Wt(kg): --    Daily     Daily Weight in k (2018 05:06)  (pt weight in 2018 is documented at 74 kgs - altho pt was fluid overloaded)    GENERAL:  arousable   HEENT:  NC/AT, normocephalic, sclera anicteric, temporal wasting, attempting to open eyes to command  NECK:    supple, no JVD, + tracheostomy  CVS:     reg, + left chest wall PPM, R sided perm cath  RESP:  vented, course, sonorous breath sounds  GI:    + PEG tube, flat, nontender, +BS,   :    aneuric, on HD  Rectal:  rectal tube in place,   MUSC:   no movement to Bilat LE, moving bilat UE against gravity  NEURO:   arousable, lethargic   PSYCH:    maritza       SKIN:    + sacral decub (not directly visualized by me)  LYMPH:      neg  	                   OPIATE NAÏVE (Y/N):  yes  ALLERGIES: No Known Allergies    MEDICATIONS  (STANDING):  acetaminophen    Suspension. 650 milliGRAM(s) Oral every 8 hours  acetylcysteine 20% Inhalation 4 milliLiter(s) Inhalation two times a day  aspirin  chewable 81 milliGRAM(s) Oral daily  atorvastatin 80 milliGRAM(s) Oral at bedtime  calcitriol  Solution 0.25 MICROGram(s) Oral daily  collagenase Ointment 1 Application(s) Topical daily  dextrose 5%. 1000 milliLiter(s) (50 mL/Hr) IV Continuous <Continuous>  dextrose 50% Injectable 12.5 Gram(s) IV Push once  dextrose 50% Injectable 25 Gram(s) IV Push once  dextrose 50% Injectable 25 Gram(s) IV Push once  escitalopram 5 milliGRAM(s) Oral daily  fluconAZOLE IVPB 200 milliGRAM(s) IV Intermittent every 24 hours  folic acid 1 milliGRAM(s) Oral daily  hydrocortisone 10 milliGRAM(s) Oral every 12 hours  insulin glargine Injectable (LANTUS) 7 Unit(s) SubCutaneous at bedtime  insulin regular  human corrective regimen sliding scale   SubCutaneous <User Schedule>  insulin regular  human recombinant 17 Unit(s) SubCutaneous every 6 hours  levETIRAcetam  Solution 500 milliGRAM(s) Oral every 24 hours  metoclopramide 5 milliGRAM(s) Oral every 12 hours  midodrine 10 milliGRAM(s) Oral every 8 hours  modafinil 100 milliGRAM(s) Oral daily  multivitamin 1 Tablet(s) Oral daily  piperacillin/tazobactam IVPB. 2.25 Gram(s) IV Intermittent every 8 hours    MEDICATIONS  (PRN):  dextrose Gel 1 Dose(s) Oral once PRN Blood Glucose LESS THAN 70 milliGRAM(s)/deciliter  glucagon  Injectable 1 milliGRAM(s) IntraMuscular once PRN Glucose LESS THAN 70 milligrams/deciliter        	                   LABS REVIEWED                               9.3    25.8  )-----------( 330      ( 2018 12:11 )             31.2     06-14    146<H>  |  99  |  69<H>  ----------------------------<  268<H>  5.2   |  27  |  4.12<H>    Ca    9.8      2018 12:11                    IMAGING REVIEWED:    no new imaging  	  ADVANCED DIRECTIVES:    DNR   (AICD not deactivated)    Decision Maker:  pts HCP is Cristal [place]   [phone]    PSYCHOSOCIAL-SPIRITUAL ASSESSMENT:       Care plan unchanged          GOALS OF CARE DISCUSSION:       Palliative care info/counseling provided	           Family meeting--       Advanced Directives; DNR as of May 2018       See previous Palliative Medicine Note  	        REFERRALS:        Palliative Med        Unit SW/Case Mgmt              Patient/Family Support              Nutrition       Ethics       PT/OT

## 2018-06-16 LAB
ANION GAP SERPL CALC-SCNC: 17 MMOL/L — SIGNIFICANT CHANGE UP (ref 5–17)
BUN SERPL-MCNC: 71 MG/DL — HIGH (ref 7–23)
CALCIUM SERPL-MCNC: 9.3 MG/DL — SIGNIFICANT CHANGE UP (ref 8.4–10.5)
CHLORIDE SERPL-SCNC: 100 MMOL/L — SIGNIFICANT CHANGE UP (ref 96–108)
CO2 SERPL-SCNC: 26 MMOL/L — SIGNIFICANT CHANGE UP (ref 22–31)
CREAT SERPL-MCNC: 4.11 MG/DL — HIGH (ref 0.5–1.3)
EXTRA GREEN TOP TUBE: SIGNIFICANT CHANGE UP
EXTRA LAVENDER TOP TUBE: SIGNIFICANT CHANGE UP
GLUCOSE BLDC GLUCOMTR-MCNC: 153 MG/DL — HIGH (ref 70–99)
GLUCOSE BLDC GLUCOMTR-MCNC: 232 MG/DL — HIGH (ref 70–99)
GLUCOSE BLDC GLUCOMTR-MCNC: 309 MG/DL — HIGH (ref 70–99)
GLUCOSE BLDC GLUCOMTR-MCNC: 83 MG/DL — SIGNIFICANT CHANGE UP (ref 70–99)
GLUCOSE SERPL-MCNC: 104 MG/DL — HIGH (ref 70–99)
HCT VFR BLD CALC: 29.9 % — LOW (ref 39–50)
HGB BLD-MCNC: 8.7 G/DL — LOW (ref 13–17)
INR BLD: 3.84 — HIGH (ref 0.88–1.16)
MCHC RBC-ENTMCNC: 25.9 PG — LOW (ref 27–34)
MCHC RBC-ENTMCNC: 29.1 G/DL — LOW (ref 32–36)
MCV RBC AUTO: 89 FL — SIGNIFICANT CHANGE UP (ref 80–100)
PLATELET # BLD AUTO: 298 K/UL — SIGNIFICANT CHANGE UP (ref 150–400)
POTASSIUM SERPL-MCNC: 5.3 MMOL/L — SIGNIFICANT CHANGE UP (ref 3.5–5.3)
POTASSIUM SERPL-SCNC: 5.3 MMOL/L — SIGNIFICANT CHANGE UP (ref 3.5–5.3)
PROTHROM AB SERPL-ACNC: 43.8 SEC — HIGH (ref 9.8–12.7)
RBC # BLD: 3.36 M/UL — LOW (ref 4.2–5.8)
RBC # FLD: 19 % — HIGH (ref 10.3–16.9)
SODIUM SERPL-SCNC: 143 MMOL/L — SIGNIFICANT CHANGE UP (ref 135–145)
VANCOMYCIN TROUGH SERPL-MCNC: 9.6 UG/ML — LOW (ref 10–20)
WBC # BLD: 33.2 K/UL — HIGH (ref 3.8–10.5)
WBC # FLD AUTO: 33.2 K/UL — HIGH (ref 3.8–10.5)

## 2018-06-16 PROCEDURE — 71045 X-RAY EXAM CHEST 1 VIEW: CPT | Mod: 26

## 2018-06-16 PROCEDURE — 99233 SBSQ HOSP IP/OBS HIGH 50: CPT

## 2018-06-16 RX ORDER — VANCOMYCIN HCL 1 G
1000 VIAL (EA) INTRAVENOUS ONCE
Qty: 0 | Refills: 0 | Status: COMPLETED | OUTPATIENT
Start: 2018-06-16 | End: 2018-06-16

## 2018-06-16 RX ORDER — ALBUMIN HUMAN 25 %
50 VIAL (ML) INTRAVENOUS
Qty: 0 | Refills: 0 | Status: DISCONTINUED | OUTPATIENT
Start: 2018-06-16 | End: 2018-06-16

## 2018-06-16 RX ORDER — ACETAMINOPHEN 500 MG
650 TABLET ORAL ONCE
Qty: 0 | Refills: 0 | Status: COMPLETED | OUTPATIENT
Start: 2018-06-16 | End: 2018-06-16

## 2018-06-16 RX ORDER — DOXERCALCIFEROL 2.5 UG/1
2 CAPSULE ORAL ONCE
Qty: 0 | Refills: 0 | Status: COMPLETED | OUTPATIENT
Start: 2018-06-16 | End: 2018-06-16

## 2018-06-16 RX ORDER — ERYTHROPOIETIN 10000 [IU]/ML
10000 INJECTION, SOLUTION INTRAVENOUS; SUBCUTANEOUS ONCE
Qty: 0 | Refills: 0 | Status: COMPLETED | OUTPATIENT
Start: 2018-06-16 | End: 2018-06-16

## 2018-06-16 RX ADMIN — Medication 650 MILLIGRAM(S): at 17:36

## 2018-06-16 RX ADMIN — MODAFINIL 100 MILLIGRAM(S): 200 TABLET ORAL at 14:09

## 2018-06-16 RX ADMIN — Medication 1 TABLET(S): at 14:09

## 2018-06-16 RX ADMIN — Medication 1 APPLICATION(S): at 14:11

## 2018-06-16 RX ADMIN — PIPERACILLIN AND TAZOBACTAM 200 GRAM(S): 4; .5 INJECTION, POWDER, LYOPHILIZED, FOR SOLUTION INTRAVENOUS at 14:09

## 2018-06-16 RX ADMIN — INSULIN HUMAN 17 UNIT(S): 100 INJECTION, SOLUTION SUBCUTANEOUS at 00:10

## 2018-06-16 RX ADMIN — CALCITRIOL 0.25 MICROGRAM(S): 0.5 CAPSULE ORAL at 14:10

## 2018-06-16 RX ADMIN — Medication 4 MILLILITER(S): at 06:40

## 2018-06-16 RX ADMIN — Medication 650 MILLIGRAM(S): at 14:08

## 2018-06-16 RX ADMIN — Medication 4 MILLILITER(S): at 17:36

## 2018-06-16 RX ADMIN — DOXERCALCIFEROL 2 MICROGRAM(S): 2.5 CAPSULE ORAL at 13:19

## 2018-06-16 RX ADMIN — Medication 650 MILLIGRAM(S): at 22:39

## 2018-06-16 RX ADMIN — MIDODRINE HYDROCHLORIDE 10 MILLIGRAM(S): 2.5 TABLET ORAL at 06:40

## 2018-06-16 RX ADMIN — Medication 81 MILLIGRAM(S): at 14:09

## 2018-06-16 RX ADMIN — ERYTHROPOIETIN 10000 UNIT(S): 10000 INJECTION, SOLUTION INTRAVENOUS; SUBCUTANEOUS at 13:19

## 2018-06-16 RX ADMIN — Medication 5 MILLIGRAM(S): at 17:37

## 2018-06-16 RX ADMIN — INSULIN HUMAN 1: 100 INJECTION, SOLUTION SUBCUTANEOUS at 06:39

## 2018-06-16 RX ADMIN — LEVETIRACETAM 500 MILLIGRAM(S): 250 TABLET, FILM COATED ORAL at 14:11

## 2018-06-16 RX ADMIN — INSULIN GLARGINE 7 UNIT(S): 100 INJECTION, SOLUTION SUBCUTANEOUS at 22:39

## 2018-06-16 RX ADMIN — ESCITALOPRAM OXALATE 5 MILLIGRAM(S): 10 TABLET, FILM COATED ORAL at 14:09

## 2018-06-16 RX ADMIN — Medication 5 MILLIGRAM(S): at 05:52

## 2018-06-16 RX ADMIN — MIDODRINE HYDROCHLORIDE 10 MILLIGRAM(S): 2.5 TABLET ORAL at 14:10

## 2018-06-16 RX ADMIN — Medication 650 MILLIGRAM(S): at 05:51

## 2018-06-16 RX ADMIN — Medication 650 MILLIGRAM(S): at 14:38

## 2018-06-16 RX ADMIN — MIDODRINE HYDROCHLORIDE 10 MILLIGRAM(S): 2.5 TABLET ORAL at 22:39

## 2018-06-16 RX ADMIN — ATORVASTATIN CALCIUM 80 MILLIGRAM(S): 80 TABLET, FILM COATED ORAL at 22:39

## 2018-06-16 RX ADMIN — Medication 10 MILLIGRAM(S): at 14:11

## 2018-06-16 RX ADMIN — Medication 250 MILLIGRAM(S): at 01:07

## 2018-06-16 RX ADMIN — INSULIN HUMAN 17 UNIT(S): 100 INJECTION, SOLUTION SUBCUTANEOUS at 14:07

## 2018-06-16 RX ADMIN — INSULIN HUMAN 17 UNIT(S): 100 INJECTION, SOLUTION SUBCUTANEOUS at 06:39

## 2018-06-16 RX ADMIN — Medication 10 MILLIGRAM(S): at 00:42

## 2018-06-16 RX ADMIN — FLUCONAZOLE 100 MILLIGRAM(S): 150 TABLET ORAL at 14:10

## 2018-06-16 RX ADMIN — INSULIN HUMAN 4: 100 INJECTION, SOLUTION SUBCUTANEOUS at 17:59

## 2018-06-16 RX ADMIN — INSULIN HUMAN 17 UNIT(S): 100 INJECTION, SOLUTION SUBCUTANEOUS at 18:00

## 2018-06-16 RX ADMIN — PIPERACILLIN AND TAZOBACTAM 200 GRAM(S): 4; .5 INJECTION, POWDER, LYOPHILIZED, FOR SOLUTION INTRAVENOUS at 22:40

## 2018-06-16 RX ADMIN — PIPERACILLIN AND TAZOBACTAM 200 GRAM(S): 4; .5 INJECTION, POWDER, LYOPHILIZED, FOR SOLUTION INTRAVENOUS at 00:10

## 2018-06-16 RX ADMIN — Medication 650 MILLIGRAM(S): at 23:30

## 2018-06-16 RX ADMIN — Medication 1 MILLIGRAM(S): at 14:09

## 2018-06-16 RX ADMIN — Medication 650 MILLIGRAM(S): at 06:30

## 2018-06-16 NOTE — PROGRESS NOTE ADULT - PROBLEM SELECTOR PLAN 4
Stable. Persistently hypotensive throughout admission, now on midodrine 15 mg TID and hydrocortisone 10mg BID for concern of adrenal insufficiency.

## 2018-06-16 NOTE — PROGRESS NOTE ADULT - SUBJECTIVE AND OBJECTIVE BOX
Patient is a 63y old  Male who presents with a chief complaint of Septic Shock due to bilateral cellulitis - course complicated by renal failure (on hemodialysis), stroke, and respiratory failure. (2018 14:51)      INTERVAL HPI/OVERNIGHT EVENTS:  Seen by me this afternoon.  Febrile today with +loose stool via rectal tube. Family at bedside.     Review of Systems: 12 point review of systems otherwise negative    MEDICATIONS  (STANDING):  acetaminophen    Suspension. 650 milliGRAM(s) Oral every 8 hours  acetylcysteine 20% Inhalation 4 milliLiter(s) Inhalation two times a day  aspirin  chewable 81 milliGRAM(s) Oral daily  atorvastatin 80 milliGRAM(s) Oral at bedtime  calcitriol  Solution 0.25 MICROGram(s) Oral daily  collagenase Ointment 1 Application(s) Topical daily  dextrose 5%. 1000 milliLiter(s) (50 mL/Hr) IV Continuous <Continuous>  dextrose 50% Injectable 12.5 Gram(s) IV Push once  dextrose 50% Injectable 25 Gram(s) IV Push once  dextrose 50% Injectable 25 Gram(s) IV Push once  escitalopram 5 milliGRAM(s) Oral daily  fluconAZOLE IVPB 200 milliGRAM(s) IV Intermittent every 24 hours  folic acid 1 milliGRAM(s) Oral daily  hydrocortisone 10 milliGRAM(s) Oral every 12 hours  insulin glargine Injectable (LANTUS) 7 Unit(s) SubCutaneous at bedtime  insulin regular  human corrective regimen sliding scale   SubCutaneous <User Schedule>  insulin regular  human recombinant 17 Unit(s) SubCutaneous every 6 hours  levETIRAcetam  Solution 500 milliGRAM(s) Oral every 24 hours  metoclopramide 5 milliGRAM(s) Oral every 12 hours  midodrine 10 milliGRAM(s) Oral every 8 hours  modafinil 100 milliGRAM(s) Oral daily  multivitamin 1 Tablet(s) Oral daily  piperacillin/tazobactam IVPB. 2.25 Gram(s) IV Intermittent every 8 hours    MEDICATIONS  (PRN):  dextrose Gel 1 Dose(s) Oral once PRN Blood Glucose LESS THAN 70 milliGRAM(s)/deciliter  glucagon  Injectable 1 milliGRAM(s) IntraMuscular once PRN Glucose LESS THAN 70 milligrams/deciliter      Allergies    No Known Allergies    Intolerances          Vital Signs Last 24 Hrs  T(C): 37.7 (2018 19:09), Max: 39 (15 Gurmeet 2018 22:26)  T(F): 99.9 (2018 19:09), Max: 102.2 (15 Gurmeet 2018 22:26)  HR: 106 (2018 17:25) (74 - 109)  BP: 107/74 (2018 17:25) (93/65 - 136/72)  BP(mean): --  RR: 20 (2018 17:25) (18 - 22)  SpO2: 99% (2018 17:25) (97% - 100%)  CAPILLARY BLOOD GLUCOSE      POCT Blood Glucose.: 309 mg/dL (2018 17:44)  POCT Blood Glucose.: 83 mg/dL (2018 13:37)  POCT Blood Glucose.: 153 mg/dL (2018 06:24)  POCT Blood Glucose.: 140 mg/dL (15 Gurmeet 2018 23:54)  POCT Blood Glucose.: 124 mg/dL (15 Gurmeet 2018 22:32)      15 @ 07:  -  16 @ 07:00  --------------------------------------------------------  IN: 832 mL / OUT: 0 mL / NET: 832 mL     @ 07:01  -   @ 19:33  --------------------------------------------------------  IN: 62 mL / OUT: 1300 mL / NET: -1238 mL        Physical Exam:    Daily     Daily Weight in k.3 (2018 13:45)  General:  +trach, not following commands, non verbal at time of visit  HEENT:  Nonicteric, PERRLA  CV:  RRR  Lungs:  +rhonchi B/L  Abdomen:  Soft, non-tender, no distended, positive BS, no hepatosplenomegaly, +PEG  Extremities:  +edema of LE's  :  No barnes, +rectal tube  Neuro: Not following commands at time of visit  No Restraints    LABS:                        8.7    33.2  )-----------( 298      ( 2018 11:13 )             29.9     06-16    143  |  100  |  71<H>  ----------------------------<  104<H>  5.3   |  26  |  4.11<H>    Ca    9.3      2018 11:12  Mg     2.4     06-15      PT/INR - ( 2018 07:38 )   PT: 43.8 sec;   INR: 3.84                  RADIOLOGY & ADDITIONAL TESTS:

## 2018-06-16 NOTE — PROGRESS NOTE ADULT - PROBLEM SELECTOR PLAN 1
Had previously resolved but now again with sepsis (fever/tachycardia/leukocytosis).  Ruling out C.diff at this time, if + will start Vanco PO, if negative will c/w IV vancomycin. C/w previous treatment for possible sacral OM and candida UTI.

## 2018-06-16 NOTE — PROGRESS NOTE ADULT - PROBLEM SELECTOR PLAN 7
Hx of AF and LV thrombus on Coumadin prior to admission. Most recent TTE with definity shows no LV thrombus currently.

## 2018-06-16 NOTE — PROGRESS NOTE ADULT - PROBLEM SELECTOR PLAN 5
Patient noted to have persistent leukocytosis. Source of infection thought to be due to sacral ulcer leading to osteomyelitis. ID on board.   Adjust antibiotics as per renal dose clearance.  Family meeting planned for possibly Monday.

## 2018-06-16 NOTE — PROGRESS NOTE ADULT - PROBLEM SELECTOR PLAN 9
CHF with EF 10-15% 2/2 ischemic cardiomyopathy s/p AICD. Patient with persistent pleural effusions on CXR and US and b/l dependent edema.  - HOLDING ACE/BB in setting of hypotension  - c/w midodrine TID  - c/w HD to maintain fluid balance

## 2018-06-16 NOTE — PROGRESS NOTE ADULT - PROBLEM SELECTOR PLAN 8
Stable. Requiring transfusions intermittently. Likely 2/2 CKD. Iron studies consistent with anemia of chronic disease.

## 2018-06-16 NOTE — PROGRESS NOTE ADULT - SUBJECTIVE AND OBJECTIVE BOX
Patient is a 63y Male seen and evaluated at bedside. Patient remains on ventilatory support through tracheostomy. Patient with fever 102.1 in past 24 hours. Last HD treatment on 6/14 with1.6 L UF. Patient on continue IV antibiotics with fever and leukocytosis.       acetaminophen    Suspension. 650 every 8 hours  acetylcysteine 20% Inhalation 4 two times a day  albumin human 25% IVPB 50 every 1 hour  aspirin  chewable 81 daily  atorvastatin 80 at bedtime  calcitriol  Solution 0.25 daily  collagenase Ointment 1 daily  dextrose 5%. 1000 <Continuous>  dextrose 50% Injectable 12.5 once  dextrose 50% Injectable 25 once  dextrose 50% Injectable 25 once  dextrose Gel 1 once PRN  doxercalciferol Injectable 2 once  epoetin amy Injectable 19276 once  escitalopram 5 daily  fluconAZOLE IVPB 200 every 24 hours  folic acid 1 daily  glucagon  Injectable 1 once PRN  hydrocortisone 10 every 12 hours  insulin glargine Injectable (LANTUS) 7 at bedtime  insulin regular  human corrective regimen sliding scale  <User Schedule>  insulin regular  human recombinant 17 every 6 hours  levETIRAcetam  Solution 500 every 24 hours  metoclopramide 5 every 12 hours  midodrine 10 every 8 hours  modafinil 100 daily  multivitamin 1 daily  piperacillin/tazobactam IVPB. 2.25 every 8 hours      Allergies    No Known Allergies    Intolerances        T(C): , Max: 39 (06-15-18 @ 22:26)  T(F): , Max: 102.2 (06-15-18 @ 22:26)  HR: 99 (06-16-18 @ 10:45)  BP: 136/72 (06-16-18 @ 10:45)  BP(mean): --  RR: 18 (06-16-18 @ 10:45)  SpO2: 97% (06-16-18 @ 10:45)  Wt(kg): --    06-15 @ 07:01  -  06-16 @ 07:00  --------------------------------------------------------  IN: 832 mL / OUT: 0 mL / NET: 832 mL    06-16 @ 07:01  -  06-16 @ 12:51  --------------------------------------------------------  IN: 62 mL / OUT: 0 mL / NET: 62 mL    Review of Systems:  Limited. Patient off ventilatory support ventilatory support    PHYSICAL EXAM:  GENERAL: Ill appearing, awake but confused, disoriented in no acute distress at present  HEAD:  Atraumatic, Normocephalic,   EYES: Bilateral conjuctival and scleral pallor+nt  Oral cavity: Oral mucosa moist and pale  NECK: Neck supple, tracheostomy present.  CHEST/LUNG:  Bilateral decreased breath sounds, Bibasilar rales and crepitatiosn+nt, Right>left, no wheezing  HEART: Regular rate and rhythm. HOMER II/VI at LPSB, No gallop, no rub   ABDOMEN: Soft, Nontender, nondistended, PEG tube present. BS+nt, No flank tenderness.   EXTREMITIES: Bilateral thigh and leg edema+nt, No clubbing, cyanosis  Neurology: AAOx1-2, drowsy and confused, unable to follow verbal commands  SKIN: No rashes or lesions    ACCESS:  Right chest wall tunnel HD catheter present.    LABS:                        8.7    33.2  )-----------( 298      ( 16 Jun 2018 11:13 )             29.9     06-16    143  |  100  |  71<H>  ----------------------------<  104<H>  5.3   |  26  |  4.11<H>    Ca    9.3      16 Jun 2018 11:12  Mg     2.4     06-15        PT/INR - ( 16 Jun 2018 07:38 )   PT: 43.8 sec;   INR: 3.84                    RADIOLOGY & ADDITIONAL STUDIES:  < from: Xray Chest 1 View- PORTABLE-Urgent (06.06.18 @ 23:24) >    EXAM:  XR CHEST PORTABLE URGENT 1V                          PROCEDURE DATE:  06/06/2018          INTERPRETATION:  Portable chest    History: Fever follow-up abnormal exam    Similar to prior exam 6//18 with tracheostomy right venous catheter right   pleural effusion and left-sided implanted cardiac device again noted. No   acute infiltrates identified.            "Thank you for the opportunity to participate in the care of this   patient."        HESHAM BERRIOS M.D., ATTENDING RADIOLOGIST  Thisdocument has been electronically signed. Jun 7 2018 12:02PM                  < end of copied text >

## 2018-06-16 NOTE — PROGRESS NOTE ADULT - PROBLEM SELECTOR PLAN 2
Acute respiratory failure in setting of septic and cardiogenic shock, with trach placed on 4/5/18 for persistent, now chronic respiratory failure.

## 2018-06-16 NOTE — PROGRESS NOTE ADULT - PROBLEM SELECTOR PLAN 5
Stable. Intermittently following commands. Likely multifactorial in setting of septic and cardiogenic shock as well as brain stem infarct during admission.   C/w keppra.

## 2018-06-16 NOTE — PROGRESS NOTE ADULT - SUBJECTIVE AND OBJECTIVE BOX
Patient was seen and evaluated on dialysis.   Patient is tolerating the procedure well.   HR: 99 (06-16-18 @ 10:45)  BP: 136/72 (06-16-18 @ 10:45)  Continue dialysis:   Dialyzer: revaclear 400         QB:  400      QD: 500 2K+  Goal UF 1 to 1.5 kg over 3 Hours

## 2018-06-16 NOTE — PROGRESS NOTE ADULT - PROBLEM SELECTOR PLAN 6
ARF likely 2/2 to ischemic ATN with hypoperfusion in setting of shock this admission. Now on HD through R sided Permacath.

## 2018-06-16 NOTE — PROGRESS NOTE ADULT - ATTENDING COMMENTS
Patient examined, fellow's hx and PE reviewed and confirmed. I find GILMER, anemia, fluid overload. A/P reviewed and confirmed. Follow SCr. Epo at HD. See full note.

## 2018-06-16 NOTE — PROGRESS NOTE ADULT - PROBLEM SELECTOR PLAN 1
Patient is a 63 year old male with acute renal failure from ischemic ATN now requiring hemodialysis.     Patient was last dialyzed on 6/14 with 1.6 L UF   Will schedule for HD treatment today for UF and clearances  Low k/Low phos/renal feeding

## 2018-06-16 NOTE — PROGRESS NOTE ADULT - ASSESSMENT
63M PMH HFrEF 10-15% (ischemic), MI, s/p AICD vs PPM, possible Afib, HTN, DM2 on insulin, possible CKD, and gout, who presented with a chief complaint of generalized weakness (3/10/2018) s/p septic shock likely 2/2 LE cellulitis complicated by ATN requiring dialysis. Course complicated by AMS likely from brainstem infarct 2/2 LV thrombus vs toxic metabolic encephalopathy. Further complicated by development of candidemia and sepsis 2/2 klebsiella bacteremia s/p fluconazole and CTX, now resolved. s/p completed course of Zosyn for aspiration PNA.  Goals of care discussion for which patient made DNR, but with continued escalation of care. Continues to undergo management for respiratory failure with weaning as tolerated with CPAP, trach collar trials. With complications of hypotension, tolerating HD w/o albumin. Stable  for continued vent weaning prior to d/c to long term care facility with HD+vent weaning. 63 year old male with,

## 2018-06-17 LAB
ANION GAP SERPL CALC-SCNC: 18 MMOL/L — HIGH (ref 5–17)
ANISOCYTOSIS BLD QL: SLIGHT — SIGNIFICANT CHANGE UP
BUN SERPL-MCNC: 52 MG/DL — HIGH (ref 7–23)
C DIFF GDH STL QL: NEGATIVE — SIGNIFICANT CHANGE UP
C DIFF GDH STL QL: SIGNIFICANT CHANGE UP
CALCIUM SERPL-MCNC: 9.4 MG/DL — SIGNIFICANT CHANGE UP (ref 8.4–10.5)
CHLORIDE SERPL-SCNC: 96 MMOL/L — SIGNIFICANT CHANGE UP (ref 96–108)
CO2 SERPL-SCNC: 23 MMOL/L — SIGNIFICANT CHANGE UP (ref 22–31)
CREAT SERPL-MCNC: 2.97 MG/DL — HIGH (ref 0.5–1.3)
EOSINOPHIL NFR BLD AUTO: 1 % — SIGNIFICANT CHANGE UP (ref 0–6)
GLUCOSE BLDC GLUCOMTR-MCNC: 144 MG/DL — HIGH (ref 70–99)
GLUCOSE BLDC GLUCOMTR-MCNC: 195 MG/DL — HIGH (ref 70–99)
GLUCOSE BLDC GLUCOMTR-MCNC: 196 MG/DL — HIGH (ref 70–99)
GLUCOSE BLDC GLUCOMTR-MCNC: 254 MG/DL — HIGH (ref 70–99)
GLUCOSE BLDC GLUCOMTR-MCNC: 280 MG/DL — HIGH (ref 70–99)
GLUCOSE BLDC GLUCOMTR-MCNC: 86 MG/DL — SIGNIFICANT CHANGE UP (ref 70–99)
GLUCOSE SERPL-MCNC: 96 MG/DL — SIGNIFICANT CHANGE UP (ref 70–99)
HCT VFR BLD CALC: 31.5 % — LOW (ref 39–50)
HGB BLD-MCNC: 9.3 G/DL — LOW (ref 13–17)
INR BLD: 3.31 — HIGH (ref 0.88–1.16)
INR BLD: >24 — CRITICAL HIGH (ref 0.88–1.16)
LG PLATELETS BLD QL AUTO: PRESENT — SIGNIFICANT CHANGE UP
LYMPHOCYTES # BLD AUTO: 8 % — LOW (ref 13–44)
MACROCYTES BLD QL: SLIGHT — SIGNIFICANT CHANGE UP
MAGNESIUM SERPL-MCNC: 2.2 MG/DL — SIGNIFICANT CHANGE UP (ref 1.6–2.6)
MANUAL DIF COMMENT BLD-IMP: SIGNIFICANT CHANGE UP
MANUAL SMEAR VERIFICATION: SIGNIFICANT CHANGE UP
MCHC RBC-ENTMCNC: 26.2 PG — LOW (ref 27–34)
MCHC RBC-ENTMCNC: 29.5 G/DL — LOW (ref 32–36)
MCV RBC AUTO: 88.7 FL — SIGNIFICANT CHANGE UP (ref 80–100)
MICROCYTES BLD QL: SLIGHT — SIGNIFICANT CHANGE UP
MONOCYTES NFR BLD AUTO: 1 % — LOW (ref 2–14)
NEUTROPHILS NFR BLD AUTO: 88 % — HIGH (ref 43–77)
NEUTS BAND # BLD: 2 % — SIGNIFICANT CHANGE UP
NEUTS VAC BLD QL SMEAR: PRESENT — SIGNIFICANT CHANGE UP
OVALOCYTES BLD QL SMEAR: SLIGHT — SIGNIFICANT CHANGE UP
PHOSPHATE SERPL-MCNC: 4.9 MG/DL — HIGH (ref 2.5–4.5)
PLAT MORPH BLD: ABNORMAL
PLATELET # BLD AUTO: 285 K/UL — SIGNIFICANT CHANGE UP (ref 150–400)
POLYCHROMASIA BLD QL SMEAR: SLIGHT — SIGNIFICANT CHANGE UP
POTASSIUM SERPL-MCNC: 5 MMOL/L — SIGNIFICANT CHANGE UP (ref 3.5–5.3)
POTASSIUM SERPL-SCNC: 5 MMOL/L — SIGNIFICANT CHANGE UP (ref 3.5–5.3)
PROTHROM AB SERPL-ACNC: 37.7 SEC — HIGH (ref 9.8–12.7)
PROTHROM AB SERPL-ACNC: >320 SEC — HIGH (ref 9.8–12.7)
RBC # BLD: 3.55 M/UL — LOW (ref 4.2–5.8)
RBC # FLD: 19.2 % — HIGH (ref 10.3–16.9)
RBC BLD AUTO: ABNORMAL
SCHISTOCYTES BLD QL AUTO: SIGNIFICANT CHANGE UP
SODIUM SERPL-SCNC: 137 MMOL/L — SIGNIFICANT CHANGE UP (ref 135–145)
SPHEROCYTES BLD QL SMEAR: SLIGHT — SIGNIFICANT CHANGE UP
WBC # BLD: 33.3 K/UL — HIGH (ref 3.8–10.5)
WBC # FLD AUTO: 33.3 K/UL — HIGH (ref 3.8–10.5)

## 2018-06-17 PROCEDURE — 99233 SBSQ HOSP IP/OBS HIGH 50: CPT

## 2018-06-17 PROCEDURE — 99233 SBSQ HOSP IP/OBS HIGH 50: CPT | Mod: GC

## 2018-06-17 RX ORDER — VANCOMYCIN HCL 1 G
1000 VIAL (EA) INTRAVENOUS ONCE
Qty: 0 | Refills: 0 | Status: COMPLETED | OUTPATIENT
Start: 2018-06-17 | End: 2018-06-17

## 2018-06-17 RX ADMIN — MIDODRINE HYDROCHLORIDE 10 MILLIGRAM(S): 2.5 TABLET ORAL at 21:32

## 2018-06-17 RX ADMIN — FLUCONAZOLE 100 MILLIGRAM(S): 150 TABLET ORAL at 15:00

## 2018-06-17 RX ADMIN — PIPERACILLIN AND TAZOBACTAM 200 GRAM(S): 4; .5 INJECTION, POWDER, LYOPHILIZED, FOR SOLUTION INTRAVENOUS at 21:32

## 2018-06-17 RX ADMIN — Medication 5 MILLIGRAM(S): at 06:40

## 2018-06-17 RX ADMIN — Medication 1 APPLICATION(S): at 13:08

## 2018-06-17 RX ADMIN — Medication 650 MILLIGRAM(S): at 14:56

## 2018-06-17 RX ADMIN — LEVETIRACETAM 500 MILLIGRAM(S): 250 TABLET, FILM COATED ORAL at 15:01

## 2018-06-17 RX ADMIN — Medication 650 MILLIGRAM(S): at 21:32

## 2018-06-17 RX ADMIN — Medication 1 MILLIGRAM(S): at 13:03

## 2018-06-17 RX ADMIN — INSULIN HUMAN 17 UNIT(S): 100 INJECTION, SOLUTION SUBCUTANEOUS at 00:58

## 2018-06-17 RX ADMIN — Medication 1 TABLET(S): at 13:03

## 2018-06-17 RX ADMIN — Medication 250 MILLIGRAM(S): at 14:57

## 2018-06-17 RX ADMIN — Medication 4 MILLILITER(S): at 06:43

## 2018-06-17 RX ADMIN — ESCITALOPRAM OXALATE 5 MILLIGRAM(S): 10 TABLET, FILM COATED ORAL at 13:03

## 2018-06-17 RX ADMIN — MODAFINIL 100 MILLIGRAM(S): 200 TABLET ORAL at 13:03

## 2018-06-17 RX ADMIN — Medication 10 MILLIGRAM(S): at 00:59

## 2018-06-17 RX ADMIN — Medication 650 MILLIGRAM(S): at 06:40

## 2018-06-17 RX ADMIN — INSULIN HUMAN 17 UNIT(S): 100 INJECTION, SOLUTION SUBCUTANEOUS at 13:04

## 2018-06-17 RX ADMIN — Medication 81 MILLIGRAM(S): at 13:03

## 2018-06-17 RX ADMIN — INSULIN HUMAN 17 UNIT(S): 100 INJECTION, SOLUTION SUBCUTANEOUS at 17:54

## 2018-06-17 RX ADMIN — PIPERACILLIN AND TAZOBACTAM 200 GRAM(S): 4; .5 INJECTION, POWDER, LYOPHILIZED, FOR SOLUTION INTRAVENOUS at 06:40

## 2018-06-17 RX ADMIN — Medication 650 MILLIGRAM(S): at 15:56

## 2018-06-17 RX ADMIN — Medication 650 MILLIGRAM(S): at 22:02

## 2018-06-17 RX ADMIN — MIDODRINE HYDROCHLORIDE 10 MILLIGRAM(S): 2.5 TABLET ORAL at 06:40

## 2018-06-17 RX ADMIN — INSULIN HUMAN 3: 100 INJECTION, SOLUTION SUBCUTANEOUS at 17:55

## 2018-06-17 RX ADMIN — Medication 10 MILLIGRAM(S): at 13:04

## 2018-06-17 RX ADMIN — ATORVASTATIN CALCIUM 80 MILLIGRAM(S): 80 TABLET, FILM COATED ORAL at 21:32

## 2018-06-17 RX ADMIN — Medication 4 MILLILITER(S): at 17:54

## 2018-06-17 RX ADMIN — INSULIN GLARGINE 7 UNIT(S): 100 INJECTION, SOLUTION SUBCUTANEOUS at 22:42

## 2018-06-17 RX ADMIN — PIPERACILLIN AND TAZOBACTAM 200 GRAM(S): 4; .5 INJECTION, POWDER, LYOPHILIZED, FOR SOLUTION INTRAVENOUS at 14:56

## 2018-06-17 RX ADMIN — INSULIN HUMAN 1: 100 INJECTION, SOLUTION SUBCUTANEOUS at 00:58

## 2018-06-17 RX ADMIN — CALCITRIOL 0.25 MICROGRAM(S): 0.5 CAPSULE ORAL at 13:04

## 2018-06-17 RX ADMIN — INSULIN HUMAN 3: 100 INJECTION, SOLUTION SUBCUTANEOUS at 13:04

## 2018-06-17 RX ADMIN — Medication 650 MILLIGRAM(S): at 07:40

## 2018-06-17 RX ADMIN — MIDODRINE HYDROCHLORIDE 10 MILLIGRAM(S): 2.5 TABLET ORAL at 15:01

## 2018-06-17 RX ADMIN — Medication 5 MILLIGRAM(S): at 17:54

## 2018-06-17 NOTE — PROGRESS NOTE ADULT - PROBLEM SELECTOR PLAN 3
DM2 on Januvia at home. HbA1C 8.6  - c/w regular insulin 15u q 6 hrs  - ISS DM2 on Januvia at home. HbA1C 8.6  - c/w regular insulin 15u q6 hrs and Lantus 7units QHS  - ISS

## 2018-06-17 NOTE — PROVIDER CONTACT NOTE (CRITICAL VALUE NOTIFICATION) - ACTION/TREATMENT ORDERED:
Kayexalate order and BMP to be rechecked tonight
HOLD Vancomycin
MD MONROE AWARE, LAB VALUE TO BE REPEATED.
Patient ordered for 1U PRBC intradialysis
2nd 250cc bolus normal saline and blood/PRBCs. will continue to monitor
repeat BMP to confirm.

## 2018-06-17 NOTE — PROGRESS NOTE ADULT - SUBJECTIVE AND OBJECTIVE BOX
Patient is a 63y Male seen and evaluated at bedside. Patient lying in bed in no acute distress at present remains febrile on IV antibiotics for sacral decubitus and on mechanical ventilatory support. Last HD on 6/16 with 1.3 L UF. Tolerated procedure well.       acetaminophen    Suspension. 650 every 8 hours  acetylcysteine 20% Inhalation 4 two times a day  aspirin  chewable 81 daily  atorvastatin 80 at bedtime  calcitriol  Solution 0.25 daily  collagenase Ointment 1 daily  dextrose 5%. 1000 <Continuous>  dextrose 50% Injectable 12.5 once  dextrose 50% Injectable 25 once  dextrose 50% Injectable 25 once  dextrose Gel 1 once PRN  escitalopram 5 daily  fluconAZOLE IVPB 200 every 24 hours  folic acid 1 daily  glucagon  Injectable 1 once PRN  hydrocortisone 10 every 12 hours  insulin glargine Injectable (LANTUS) 7 at bedtime  insulin regular  human corrective regimen sliding scale  <User Schedule>  insulin regular  human recombinant 17 every 6 hours  levETIRAcetam  Solution 500 every 24 hours  metoclopramide 5 every 12 hours  midodrine 10 every 8 hours  modafinil 100 daily  multivitamin 1 daily  piperacillin/tazobactam IVPB. 2.25 every 8 hours  vancomycin  IVPB 1000 once      Allergies    No Known Allergies    Intolerances        T(C): , Max: 38.3 (06-16-18 @ 17:25)  T(F): , Max: 100.9 (06-16-18 @ 17:25)  HR: 94 (06-17-18 @ 10:00)  BP: 125/79 (06-17-18 @ 10:00)  BP(mean): --  RR: 14 (06-17-18 @ 10:00)  SpO2: 100% (06-17-18 @ 10:00)  Wt(kg): --    06-16 @ 07:01  -  06-17 @ 07:00  --------------------------------------------------------  IN: 62 mL / OUT: 1300 mL / NET: -1238 mL    06-17 @ 07:01  -  06-17 @ 13:24  --------------------------------------------------------  IN: 310 mL / OUT: 0 mL / NET: 310 mL    Review of Systems:  Limited. Patient off ventilatory support ventilatory support    PHYSICAL EXAM:  GENERAL: Ill appearing, awake but confused, disoriented in no acute distress at present  HEAD:  Atraumatic, Normocephalic,   EYES: Bilateral conjuctival and scleral pallor+nt  Oral cavity: Oral mucosa moist and pale  NECK: Neck supple, tracheostomy present.  CHEST/LUNG:  Bilateral decreased breath sounds, Bibasilar rales and crepitatiosn+nt, Right>left, no wheezing  HEART: Regular rate and rhythm. HOMER II/VI at LPSB, No gallop, no rub   ABDOMEN: Soft, Nontender, nondistended, PEG tube present. BS+nt, No flank tenderness. Sacral decubitus per nursing  EXTREMITIES: Bilateral thigh and leg edema+nt, No clubbing, cyanosis  Neurology: AAOx1-2, drowsy and confused, unable to follow verbal commands  SKIN: No rashes or lesions    ACCESS:  Right chest wall tunnel HD catheter present.                LABS:                        9.3    33.3  )-----------( 285      ( 17 Jun 2018 08:00 )             31.5     06-17    137  |  96  |  52<H>  ----------------------------<  96  5.0   |  23  |  2.97<H>    Ca    9.4      17 Jun 2018 08:00  Phos  4.9     06-17  Mg     2.2     06-17        PT/INR - ( 17 Jun 2018 11:47 )   PT: 37.7 sec;   INR: 3.31                    RADIOLOGY & ADDITIONAL STUDIES:

## 2018-06-17 NOTE — PROGRESS NOTE ADULT - ATTENDING COMMENTS
Afebrile today, C.diff negative.  Will give vancomycin x 1 dose again today, check vanco random level in AM and await further ID recs tomorrow.  Rest as above.

## 2018-06-17 NOTE — PROGRESS NOTE ADULT - PROBLEM SELECTOR PLAN 4
Patient noted to have persistent leukocytosis. Source of infection thought to be due to sacral ulcer leading to osteomyelitis.  Plan per primary/ID team.  Adjust antibiotics as per renal dose clearance.  Family meeting planned for possibly Monday.

## 2018-06-17 NOTE — PROGRESS NOTE ADULT - PROBLEM SELECTOR PLAN 7
Hx of AF and LV thrombus on Coumadin prior to admission. Most recent TTE with definity shows no LV thrombus currently.   - Not currently on rate control as HR has remained   - Lopressor pushes for HR goal <110  - dose Coumadin daily based on INR, will consider qod INR checks with HD to minimize lab draws    #LV thrombus- LV thrombus noted on RUKHSANA on 4/10. Remains therapeutic on warfarin. Hx of AF and LV thrombus on Coumadin prior to admission. Most recent TTE with definity shows no LV thrombus currently.   - Not currently on rate control as HR has remained   - Lopressor pushes for HR goal <110  - dose Coumadin daily based on INR  - INR this morning supratherapeutic, but likely erroneous based on trend and fact that patient has not received coumadin in 3 days; repeat drawn    #LV thrombus- LV thrombus noted on RUKHSANA on 4/10. Remains therapeutic on warfarin.

## 2018-06-17 NOTE — PROGRESS NOTE ADULT - PROBLEM SELECTOR PLAN 1
#Sepsis- Resolved.   - c/w treatment for sacral decub and UTI as below  - f/u bcxs- NGTD- and fungal cxs    #Sacral decubitus ulcer- unstageable s/p debridement with plastic surgery. Cultures growing multiple organisms including Pseudomonas and E faecium, now on Zosyn per sensitivities. Pelvic MRI showing acute osteomyelitis and loculated collection abutting the bladder. Patient is not a surgical candidate based on his comorbidities and inability lay in prone positioning postop ([id number]). At this time, will not evaluate possible abscess further - will likely recur given poor prognosis.  - c/w Zosyn (6/4-), will d/w family about continuation of abx therapy given that benefit/risk may favor discontinuation to avoid resistance given that ulcer is uncurable   - frequent turning q2h for pressure offloading, air fluid mattress  - wound care with dry dressings with Allevyn over top  - soilage control with rectal tube, benefits outweigh risks at this point   - Nutrition support  - Bcxs NGTD, will f/u Fungal Bcxs    #UTI- Patient with urinary retention requiring chronic indwelling Jose catheter. Urine cultures growing Sherrie Dubliniensis sensitive to Fluconazole.  - C/w Fluconazole 200mg (6/7- 6/21) for 2 week course per ID #Sepsis- Meeting SIRS criteria with leukocytosis and fever yesterday. No longer febrile, however with uptrending WBC. Currently being treated for unsteagable/incurable sacral ulcer with e/o osteo as well as candidal UTI. Uptrending WBC likely 2/2 sacral infection. There was a c/f C difficile given diarrhea, however, c difficile PCR was negative.  - c/w treatment for sacral decub and UTI as below  - consider vancomycin with HD to cover for gram + organisms; however, in this situation where patient has non-surgical sacral wound and poor prognosis, may be little utility in continuing abx at this time as patient likely to develop resistance  - f/u bcxs- NGTD- and fungal cxs    #Sacral decubitus ulcer- unstageable s/p debridement with plastic surgery. Cultures growing multiple organisms including Pseudomonas and E faecium, now on Zosyn per sensitivities. Pelvic MRI showing acute osteomyelitis and loculated collection abutting the bladder. Patient is not a surgical candidate based on his comorbidities and inability lay in prone positioning postop ([id number]). At this time, will not evaluate possible abscess further - will likely recur given poor prognosis.  - c/w Zosyn (6/4-), will d/w family about continuation of abx therapy given that benefit/risk may favor discontinuation to avoid resistance given that ulcer is uncurable   - frequent turning q2h for pressure offloading, air fluid mattress  - wound care with dry dressings with Allevyn over top  - soilage control with rectal tube, benefits outweigh risks at this point   - Nutrition support  - Bcxs NGTD, Fungal Bcxs NGTD    #UTI- Patient with urinary retention requiring chronic indwelling Jose catheter. Urine cultures growing Sherrie Dubliniensis sensitive to Fluconazole.  - C/w Fluconazole 200mg (6/7- 6/21) for 2 week course per ID

## 2018-06-17 NOTE — PROGRESS NOTE ADULT - PROBLEM SELECTOR PLAN 1
Patient is a 63 year old male with acute renal failure from ischemic ATN now requiring hemodialysis.     Patient was last dialyzed on 6/16 with 1.3 L UF   Will defer HD treatment today   Volume status and electrolytes stable.  Low k/Low phos/renal feeding

## 2018-06-17 NOTE — PROGRESS NOTE ADULT - ATTENDING COMMENTS
Patient examined, fellow's hx and PE reviewed and confirmed. I find ESRD, anemia. A/P reviewed and confirmed. Continue HD. Epo at HD PRN. See full note.

## 2018-06-17 NOTE — PROVIDER CONTACT NOTE (CRITICAL VALUE NOTIFICATION) - SITUATION
repeat K remains high
CRITICAL LAB VALUE REPORTED, INR >24
Critical value received from lab regarding patient's hgb of 6.2 and hct of 21.3, MD Thompson made aware. Patient ordered for 1U PRBC intradialysis.
Low BP, MAP >60
patient dialysis catheter removed yesterday and is now hyperkalemic

## 2018-06-17 NOTE — PROVIDER CONTACT NOTE (CRITICAL VALUE NOTIFICATION) - TEST AND RESULT REPORTED:
Blood culture drawn 4/13, 1 aerobic bottle, gram neg. rods
HGB 6.7/ HCT 22.5
Hgb 7.0
INR >24
Lactate: 6.6
Lactic Acid=4.4
Vancomycin Level 27
WBC 46
hgb 6.2 hct 21.3
k=6.3
lactic 6.7
trop 0.08
VRE in mixed culture (possibly contaminated)
K=6.4

## 2018-06-17 NOTE — PROGRESS NOTE ADULT - SUBJECTIVE AND OBJECTIVE BOX
PGY1 PROGRESS NOTE:    CC: septic Shock due to bilateral cellulitis - course complicated by renal failure (on hemodialysis), stroke, and respiratory failure. (01 Jun 2018 14:51)    OVERNIGHT EVENTS: ELSIE    SUBJECTIVE / INTERVAL HPI: Pending C difficile test. Patient seen and examined at bedside. Patient awakens to voice and able to follow some commands. Unable to assess ROS.     ROS: negative unless otherwise specified    VITAL SIGNS:  T(F): 98.6 (17 Jun 2018 10:00), Max: 100.9 (16 Jun 2018 17:25)  HR: 94 (17 Jun 2018 10:00) (74 - 106)  BP: 125/79 (17 Jun 2018 10:00) (105/70 - 129/74)  RR: 14 (17 Jun 2018 10:00) (14 - 24)  SpO2: 100% (17 Jun 2018 10:00) (97% - 100%)      PHYSICAL EXAM:  Constitutional: thin, cachectic, chromically ill appearing, tracheostomy on ventilator, NAD    HEENT: temporal wasting, PERRLA, EOMI, dry blood on lips and oral mucosa, dry MM  Neck: No JVD, tracheostomy with minimal mucoid discharge  Respiratory: course breath sounds b/l, CTA b/l, no wheeze, rales, rhonchi  Cardiovascular: AICD in place, normal S1S2, no MRG  Gastrointestinal: mildly distended, soft, non-tender, +BS, +PEG tube, +rectal tube with green-brown loose stools  Extremities: hyperpigmented skin on anterior shin RLE, trace dependent edema   Musculoskeletal: increased rigidity in UE, atrophied musculature in b/l LE  Vascular: 1+ peripheral pulses  Neurological: intermittently able to nod head to questions and following commands, moving all extremities spontaneously, no focal deficits  Skin: Unstageable sacral ulcer - dressing overlying, stained with blood. When dressing removed, it is an open, crater-like ulceration.     MEDICATIONS:  MEDICATIONS  (STANDING):  acetaminophen    Suspension. 650 milliGRAM(s) Oral every 8 hours  acetylcysteine 20% Inhalation 4 milliLiter(s) Inhalation two times a day  aspirin  chewable 81 milliGRAM(s) Oral daily  atorvastatin 80 milliGRAM(s) Oral at bedtime  calcitriol  Solution 0.25 MICROGram(s) Oral daily  collagenase Ointment 1 Application(s) Topical daily  dextrose 5%. 1000 milliLiter(s) (50 mL/Hr) IV Continuous <Continuous>  dextrose 50% Injectable 12.5 Gram(s) IV Push once  dextrose 50% Injectable 25 Gram(s) IV Push once  dextrose 50% Injectable 25 Gram(s) IV Push once  escitalopram 5 milliGRAM(s) Oral daily  fluconAZOLE IVPB 200 milliGRAM(s) IV Intermittent every 24 hours  folic acid 1 milliGRAM(s) Oral daily  hydrocortisone 10 milliGRAM(s) Oral every 12 hours  insulin glargine Injectable (LANTUS) 7 Unit(s) SubCutaneous at bedtime  insulin regular  human corrective regimen sliding scale   SubCutaneous <User Schedule>  insulin regular  human recombinant 17 Unit(s) SubCutaneous every 6 hours  levETIRAcetam  Solution 500 milliGRAM(s) Oral every 24 hours  metoclopramide 5 milliGRAM(s) Oral every 12 hours  midodrine 10 milliGRAM(s) Oral every 8 hours  modafinil 100 milliGRAM(s) Oral daily  multivitamin 1 Tablet(s) Oral daily  piperacillin/tazobactam IVPB. 2.25 Gram(s) IV Intermittent every 8 hours    MEDICATIONS  (PRN):  dextrose Gel 1 Dose(s) Oral once PRN Blood Glucose LESS THAN 70 milliGRAM(s)/deciliter  glucagon  Injectable 1 milliGRAM(s) IntraMuscular once PRN Glucose LESS THAN 70 milligrams/deciliter      ALLERGIES:  No Known Allergies      LABS:                                    9.3    33.3  )-----------( 285      ( 17 Jun 2018 08:00 )             31.5     06-17    137  |  96  |  52<H>  ----------------------------<  96  5.0   |  23  |  2.97<H>    Ca    9.4      17 Jun 2018 08:00  Phos  4.9     06-17  Mg     2.2     06-17      PT/INR - ( 17 Jun 2018 08:00 )   PT: >320.0 sec;   INR: >24.00        CAPILLARY BLOOD GLUCOSE  POCT Blood Glucose.: 144 mg/dL (17 Jun 2018 09:03)  POCT Blood Glucose.: 86 mg/dL (17 Jun 2018 06:36)  POCT Blood Glucose.: 196 mg/dL (17 Jun 2018 00:21)  POCT Blood Glucose.: 232 mg/dL (16 Jun 2018 22:11)  POCT Blood Glucose.: 309 mg/dL (16 Jun 2018 17:44)  POCT Blood Glucose.: 83 mg/dL (16 Jun 2018 13:37)    I&O's Summary  16 Jun 2018 07:01  -  17 Jun 2018 07:00  --------------------------------------------------------  IN: 62 mL / OUT: 1300 mL / NET: -1238 mL    17 Jun 2018 07:01  -  17 Jun 2018 11:24  --------------------------------------------------------  IN: 310 mL / OUT: 0 mL / NET: 310 mL       RADIOLOGY & ADDITIONAL TESTS: Reviewed. PGY1 PROGRESS NOTE:    CC: septic Shock due to bilateral cellulitis - course complicated by renal failure (on hemodialysis), stroke, and respiratory failure. (01 Jun 2018 14:51)    OVERNIGHT EVENTS: ELSIE    SUBJECTIVE / INTERVAL HPI: Ordered c difficile test for diarrhea and leukocytosis which was negative. Patient seen and examined at bedside. Patient awakens to voice and able to follow some commands. Unable to assess ROS.     ROS: negative unless otherwise specified    VITAL SIGNS:  T(F): 98.6 (17 Jun 2018 10:00), Max: 100.9 (16 Jun 2018 17:25)  HR: 94 (17 Jun 2018 10:00) (74 - 106)  BP: 125/79 (17 Jun 2018 10:00) (105/70 - 129/74)  RR: 14 (17 Jun 2018 10:00) (14 - 24)  SpO2: 100% (17 Jun 2018 10:00) (97% - 100%)      PHYSICAL EXAM:  Constitutional: thin, cachectic, chromically ill appearing, tracheostomy on ventilator, NAD    HEENT: temporal wasting, PERRLA, EOMI, dry blood on lips and oral mucosa, dry MM  Neck: No JVD, tracheostomy with minimal mucoid discharge  Respiratory: course breath sounds b/l, CTA b/l, no wheeze, rales, rhonchi  Cardiovascular: AICD in place, normal S1S2, no MRG  Gastrointestinal: mildly distended, soft, non-tender, +BS, +PEG tube, +rectal tube with green-brown loose stools  Extremities: hyperpigmented skin on anterior shin RLE, trace dependent edema   Musculoskeletal: increased rigidity in UE, atrophied musculature in b/l LE  Vascular: 1+ peripheral pulses  Neurological: intermittently able to nod head to questions and following commands, moving all extremities spontaneously, no focal deficits  Skin: Unstageable sacral ulcer - dressing overlying, stained with blood. When dressing removed, it is an open, crater-like ulceration.     MEDICATIONS:  MEDICATIONS  (STANDING):  acetaminophen    Suspension. 650 milliGRAM(s) Oral every 8 hours  acetylcysteine 20% Inhalation 4 milliLiter(s) Inhalation two times a day  aspirin  chewable 81 milliGRAM(s) Oral daily  atorvastatin 80 milliGRAM(s) Oral at bedtime  calcitriol  Solution 0.25 MICROGram(s) Oral daily  collagenase Ointment 1 Application(s) Topical daily  dextrose 5%. 1000 milliLiter(s) (50 mL/Hr) IV Continuous <Continuous>  dextrose 50% Injectable 12.5 Gram(s) IV Push once  dextrose 50% Injectable 25 Gram(s) IV Push once  dextrose 50% Injectable 25 Gram(s) IV Push once  escitalopram 5 milliGRAM(s) Oral daily  fluconAZOLE IVPB 200 milliGRAM(s) IV Intermittent every 24 hours  folic acid 1 milliGRAM(s) Oral daily  hydrocortisone 10 milliGRAM(s) Oral every 12 hours  insulin glargine Injectable (LANTUS) 7 Unit(s) SubCutaneous at bedtime  insulin regular  human corrective regimen sliding scale   SubCutaneous <User Schedule>  insulin regular  human recombinant 17 Unit(s) SubCutaneous every 6 hours  levETIRAcetam  Solution 500 milliGRAM(s) Oral every 24 hours  metoclopramide 5 milliGRAM(s) Oral every 12 hours  midodrine 10 milliGRAM(s) Oral every 8 hours  modafinil 100 milliGRAM(s) Oral daily  multivitamin 1 Tablet(s) Oral daily  piperacillin/tazobactam IVPB. 2.25 Gram(s) IV Intermittent every 8 hours    MEDICATIONS  (PRN):  dextrose Gel 1 Dose(s) Oral once PRN Blood Glucose LESS THAN 70 milliGRAM(s)/deciliter  glucagon  Injectable 1 milliGRAM(s) IntraMuscular once PRN Glucose LESS THAN 70 milligrams/deciliter      ALLERGIES:  No Known Allergies      LABS:                                    9.3    33.3  )-----------( 285      ( 17 Jun 2018 08:00 )             31.5     06-17    137  |  96  |  52<H>  ----------------------------<  96  5.0   |  23  |  2.97<H>    Ca    9.4      17 Jun 2018 08:00  Phos  4.9     06-17  Mg     2.2     06-17      PT/INR - ( 17 Jun 2018 08:00 )   PT: >320.0 sec;   INR: >24.00  (likely erroneous, repeat drawn)      CAPILLARY BLOOD GLUCOSE  POCT Blood Glucose.: 144 mg/dL (17 Jun 2018 09:03)  POCT Blood Glucose.: 86 mg/dL (17 Jun 2018 06:36)  POCT Blood Glucose.: 196 mg/dL (17 Jun 2018 00:21)  POCT Blood Glucose.: 232 mg/dL (16 Jun 2018 22:11)  POCT Blood Glucose.: 309 mg/dL (16 Jun 2018 17:44)  POCT Blood Glucose.: 83 mg/dL (16 Jun 2018 13:37)    I&O's Summary  16 Jun 2018 07:01  -  17 Jun 2018 07:00  --------------------------------------------------------  IN: 62 mL / OUT: 1300 mL / NET: -1238 mL    17 Jun 2018 07:01  -  17 Jun 2018 11:24  --------------------------------------------------------  IN: 310 mL / OUT: 0 mL / NET: 310 mL       RADIOLOGY & ADDITIONAL TESTS: Reviewed.

## 2018-06-17 NOTE — PROVIDER CONTACT NOTE (CRITICAL VALUE NOTIFICATION) - PERSON GIVING RESULT:
Chemistry
Chemistry Lab
Earnestine- YARELI Longoria
Hematology Lab
Kevin, Chemistry
Lab
Lab
Lab Chemistry
Vandana
Vinay, microbiology
lab
vito
Micro - Magan
Lab Chemistry

## 2018-06-17 NOTE — PROGRESS NOTE ADULT - ASSESSMENT
63M PMH HFrEF 10-15% (ischemic), MI, s/p AICD vs PPM, possible Afib, HTN, DM2 on insulin, possible CKD, and gout, who presented with a chief complaint of generalized weakness (3/10/2018) s/p septic shock likely 2/2 LE cellulitis complicated by ATN requiring dialysis. Course complicated by AMS likely from brainstem infarct 2/2 LV thrombus vs toxic metabolic encephalopathy. Further complicated by development of candidemia and sepsis 2/2 klebsiella bacteremia s/p fluconazole and CTX, now resolved. s/p completed course of Zosyn for aspiration PNA.  Goals of care discussion for which patient made DNR, but with continued escalation of care. Continues to undergo management for respiratory failure with weaning as tolerated with CPAP, trach collar trials. With complications of hypotension, tolerating HD w/o albumin. Stable  for continued vent weaning prior to d/c to long term care facility with HD+vent weaning. 63M PMH HFrEF 10-15% (ischemic), MI, s/p AICD vs PPM, possible Afib, HTN, DM2 on insulin, possible CKD, and gout, who presented with a chief complaint of generalized weakness (3/10/2018) s/p septic shock likely 2/2 LE cellulitis complicated by ATN requiring dialysis. Course complicated by AMS likely from brainstem infarct 2/2 LV thrombus vs toxic metabolic encephalopathy. Further complicated by development of candidemia and sepsis 2/2 klebsiella bacteremia s/p fluconazole and CTX, now resolved. s/p completed course of Zosyn for aspiration PNA.  Goals of care discussion for which patient made DNR, but with continued escalation of care. Continues to undergo management for respiratory failure with weaning as tolerated with CPAP, trach collar trials. With complications of hypotension, tolerating HD w/o albumin. Now with unsteageable incurable sacral wound and candidal UTI with uptrending WBC, on Zosyn. Plan for family meeting to address antibiotic therapy and placement.

## 2018-06-17 NOTE — PROVIDER CONTACT NOTE (CRITICAL VALUE NOTIFICATION) - NAME OF MD/NP/PA/DO NOTIFIED:
Dr Arciniega
Dr. Thompson
Dr Barillas informed
Dr Brian Munoz
Dr. Manzano
Dr. Xiao
Jose Juan
MD Benjamin
MD Guy
MD MONROE
MD Thompson
[facility] resident
linda alexander
Jose Juan

## 2018-06-18 LAB
GLUCOSE BLDC GLUCOMTR-MCNC: 208 MG/DL — HIGH (ref 70–99)
GLUCOSE BLDC GLUCOMTR-MCNC: 235 MG/DL — HIGH (ref 70–99)
GLUCOSE BLDC GLUCOMTR-MCNC: 235 MG/DL — HIGH (ref 70–99)
GLUCOSE BLDC GLUCOMTR-MCNC: 237 MG/DL — HIGH (ref 70–99)
GLUCOSE BLDC GLUCOMTR-MCNC: 264 MG/DL — HIGH (ref 70–99)
INR BLD: 3.14 — HIGH (ref 0.88–1.16)
PROTHROM AB SERPL-ACNC: 35.7 SEC — HIGH (ref 9.8–12.7)
VANCOMYCIN FLD-MCNC: 23.8 UG/ML — SIGNIFICANT CHANGE UP

## 2018-06-18 PROCEDURE — 99232 SBSQ HOSP IP/OBS MODERATE 35: CPT

## 2018-06-18 PROCEDURE — 99232 SBSQ HOSP IP/OBS MODERATE 35: CPT | Mod: GC

## 2018-06-18 RX ORDER — MODAFINIL 200 MG/1
100 TABLET ORAL DAILY
Qty: 0 | Refills: 0 | Status: DISCONTINUED | OUTPATIENT
Start: 2018-06-18 | End: 2018-06-25

## 2018-06-18 RX ADMIN — INSULIN HUMAN 17 UNIT(S): 100 INJECTION, SOLUTION SUBCUTANEOUS at 18:09

## 2018-06-18 RX ADMIN — Medication 4 MILLILITER(S): at 18:08

## 2018-06-18 RX ADMIN — Medication 81 MILLIGRAM(S): at 12:58

## 2018-06-18 RX ADMIN — Medication 4 MILLILITER(S): at 05:55

## 2018-06-18 RX ADMIN — ATORVASTATIN CALCIUM 80 MILLIGRAM(S): 80 TABLET, FILM COATED ORAL at 22:37

## 2018-06-18 RX ADMIN — Medication 10 MILLIGRAM(S): at 00:49

## 2018-06-18 RX ADMIN — Medication 5 MILLIGRAM(S): at 18:09

## 2018-06-18 RX ADMIN — Medication 650 MILLIGRAM(S): at 22:36

## 2018-06-18 RX ADMIN — INSULIN HUMAN 17 UNIT(S): 100 INJECTION, SOLUTION SUBCUTANEOUS at 06:13

## 2018-06-18 RX ADMIN — MIDODRINE HYDROCHLORIDE 10 MILLIGRAM(S): 2.5 TABLET ORAL at 22:37

## 2018-06-18 RX ADMIN — Medication 10 MILLIGRAM(S): at 12:58

## 2018-06-18 RX ADMIN — PIPERACILLIN AND TAZOBACTAM 200 GRAM(S): 4; .5 INJECTION, POWDER, LYOPHILIZED, FOR SOLUTION INTRAVENOUS at 22:36

## 2018-06-18 RX ADMIN — INSULIN HUMAN 3: 100 INJECTION, SOLUTION SUBCUTANEOUS at 00:49

## 2018-06-18 RX ADMIN — MIDODRINE HYDROCHLORIDE 10 MILLIGRAM(S): 2.5 TABLET ORAL at 15:20

## 2018-06-18 RX ADMIN — Medication 5 MILLIGRAM(S): at 05:53

## 2018-06-18 RX ADMIN — MIDODRINE HYDROCHLORIDE 10 MILLIGRAM(S): 2.5 TABLET ORAL at 05:53

## 2018-06-18 RX ADMIN — INSULIN HUMAN 2: 100 INJECTION, SOLUTION SUBCUTANEOUS at 06:13

## 2018-06-18 RX ADMIN — INSULIN HUMAN 2: 100 INJECTION, SOLUTION SUBCUTANEOUS at 18:09

## 2018-06-18 RX ADMIN — Medication 650 MILLIGRAM(S): at 15:20

## 2018-06-18 RX ADMIN — FLUCONAZOLE 100 MILLIGRAM(S): 150 TABLET ORAL at 15:20

## 2018-06-18 RX ADMIN — PIPERACILLIN AND TAZOBACTAM 200 GRAM(S): 4; .5 INJECTION, POWDER, LYOPHILIZED, FOR SOLUTION INTRAVENOUS at 05:54

## 2018-06-18 RX ADMIN — MODAFINIL 100 MILLIGRAM(S): 200 TABLET ORAL at 13:02

## 2018-06-18 RX ADMIN — Medication 1 MILLIGRAM(S): at 12:58

## 2018-06-18 RX ADMIN — INSULIN HUMAN 17 UNIT(S): 100 INJECTION, SOLUTION SUBCUTANEOUS at 00:49

## 2018-06-18 RX ADMIN — LEVETIRACETAM 500 MILLIGRAM(S): 250 TABLET, FILM COATED ORAL at 15:19

## 2018-06-18 RX ADMIN — ESCITALOPRAM OXALATE 5 MILLIGRAM(S): 10 TABLET, FILM COATED ORAL at 12:58

## 2018-06-18 RX ADMIN — INSULIN GLARGINE 7 UNIT(S): 100 INJECTION, SOLUTION SUBCUTANEOUS at 23:09

## 2018-06-18 RX ADMIN — Medication 650 MILLIGRAM(S): at 05:54

## 2018-06-18 RX ADMIN — INSULIN HUMAN 17 UNIT(S): 100 INJECTION, SOLUTION SUBCUTANEOUS at 12:58

## 2018-06-18 RX ADMIN — PIPERACILLIN AND TAZOBACTAM 200 GRAM(S): 4; .5 INJECTION, POWDER, LYOPHILIZED, FOR SOLUTION INTRAVENOUS at 15:20

## 2018-06-18 RX ADMIN — INSULIN HUMAN 2: 100 INJECTION, SOLUTION SUBCUTANEOUS at 12:58

## 2018-06-18 RX ADMIN — Medication 1 APPLICATION(S): at 12:59

## 2018-06-18 RX ADMIN — Medication 650 MILLIGRAM(S): at 16:20

## 2018-06-18 RX ADMIN — Medication 650 MILLIGRAM(S): at 23:30

## 2018-06-18 RX ADMIN — Medication 1 TABLET(S): at 12:58

## 2018-06-18 NOTE — PROGRESS NOTE ADULT - SUBJECTIVE AND OBJECTIVE BOX
Patient seen and examined at bedside.     Interval events reviewed  Last HD on 6/16 tolerated well without arrhythmia. Bedweight 67.7B to 66.3B   No major events developed otherwise  Ongoing infectious workup  Trach dependent    acetaminophen    Suspension. 650 milliGRAM(s) every 8 hours  acetylcysteine 20% Inhalation 4 milliLiter(s) two times a day  aspirin  chewable 81 milliGRAM(s) daily  atorvastatin 80 milliGRAM(s) at bedtime  collagenase Ointment 1 Application(s) daily  dextrose 5%. 1000 milliLiter(s) <Continuous>  dextrose 50% Injectable 12.5 Gram(s) once  dextrose 50% Injectable 25 Gram(s) once  dextrose 50% Injectable 25 Gram(s) once  dextrose Gel 1 Dose(s) once PRN  escitalopram 5 milliGRAM(s) daily  fluconAZOLE IVPB 200 milliGRAM(s) every 24 hours  folic acid 1 milliGRAM(s) daily  glucagon  Injectable 1 milliGRAM(s) once PRN  hydrocortisone 10 milliGRAM(s) every 12 hours  insulin glargine Injectable (LANTUS) 7 Unit(s) at bedtime  insulin regular  human corrective regimen sliding scale   <User Schedule>  insulin regular  human recombinant 17 Unit(s) every 6 hours  levETIRAcetam  Solution 500 milliGRAM(s) every 24 hours  metoclopramide 5 milliGRAM(s) every 12 hours  midodrine 10 milliGRAM(s) every 8 hours  modafinil 100 milliGRAM(s) daily  multivitamin 1 Tablet(s) daily  piperacillin/tazobactam IVPB. 2.25 Gram(s) every 8 hours      Allergies    No Known Allergies    Intolerances        T(C): , Max: 37.4 (06-17-18 @ 17:58)  T(F): , Max: 99.4 (06-17-18 @ 17:58)  HR: 86 (06-18-18 @ 10:00)  BP: 131/79 (06-18-18 @ 10:00)  BP(mean): --  RR: 16 (06-18-18 @ 10:00)  SpO2: 100% (06-18-18 @ 10:00)  Wt(kg): --    06-17 @ 07:01  -  06-18 @ 07:00  --------------------------------------------------------  IN:    Jevity: 682 mL    Solution: 450 mL  Total IN: 1132 mL    OUT:  Total OUT: 0 mL    Total NET: 1132 mL      06-18 @ 07:01  -  06-18 @ 11:31  --------------------------------------------------------  IN:    Jevity: 186 mL  Total IN: 186 mL    OUT:  Total OUT: 0 mL    Total NET: 186 mL              LABS:                        9.3    33.3  )-----------( 285      ( 17 Jun 2018 08:00 )             31.5     06-17    137  |  96  |  52<H>  ----------------------------<  96  5.0   |  23  |  2.97<H>    Ca    9.4      17 Jun 2018 08:00  Phos  4.9     06-17  Mg     2.2     06-17        PT/INR - ( 18 Jun 2018 07:22 )   PT: 35.7 sec;   INR: 3.14                    RADIOLOGY & ADDITIONAL STUDIES:

## 2018-06-18 NOTE — PROGRESS NOTE ADULT - ASSESSMENT
63M PMH HFrEF 10-15% (ischemic), MI, s/p AICD vs PPM, possible Afib, HTN, DM2 on insulin, possible CKD, and gout, who presented with a chief complaint of generalized weakness (3/10/2018) s/p septic shock likely 2/2 LE cellulitis complicated by ATN requiring dialysis. Course complicated by AMS likely from brainstem infarct 2/2 LV thrombus vs toxic metabolic encephalopathy. Further complicated by development of candidemia and sepsis 2/2 klebsiella bacteremia s/p fluconazole and CTX, now resolved. s/p completed course of Zosyn for aspiration PNA.  Goals of care discussion for which patient made DNR, but with continued escalation of care. Continues to undergo management for respiratory failure with weaning as tolerated with CPAP, trach collar trials. With complications of hypotension, tolerating HD w/o albumin. Now with unsteageable incurable sacral wound and candidal UTI with uptrending WBC, on Zosyn. Plan for family meeting to address antibiotic therapy and placement.

## 2018-06-18 NOTE — PROGRESS NOTE ADULT - ATTENDING COMMENTS
Pt. seen and examined by me; I have read Dr. Juárez's note, I agree w/ her findings and plan of care as documented.

## 2018-06-18 NOTE — PROGRESS NOTE ADULT - ASSESSMENT
Patient is a 63 year old male with a history of HFrEF 10-15% due to ischemic cardiomyopathy, s/p AICD, ?atrial fibrillation, hypertension, diabetes mellitus type 2, gout, and CKD for whom nephrology was called for GILMER from ATN requiring hemodialysis.   Currently dialysis dependent without recovery of renal function. Likely ESRD now as it has been over 90 days.

## 2018-06-18 NOTE — PROGRESS NOTE ADULT - PROBLEM SELECTOR PLAN 1
ESRD TTS now  Volume and chemistries acceptable  Next HD on Tuesday     f/u goals of care     May not be beneficial for AVF placement given current comorbidities and active infections.

## 2018-06-18 NOTE — PROGRESS NOTE ADULT - PROBLEM SELECTOR PLAN 7
Hx of AF and LV thrombus on Coumadin prior to admission. Most recent TTE with definity shows no LV thrombus currently.   - Not currently on rate control as HR has remained   - Lopressor pushes for HR goal <110  - dose Coumadin daily based on INR  - INR this morning supratherapeutic, but likely erroneous based on trend and fact that patient has not received coumadin in 3 days; repeat drawn    #LV thrombus- LV thrombus noted on RUKHSANA on 4/10. Remains therapeutic on warfarin.

## 2018-06-18 NOTE — PROGRESS NOTE ADULT - SUBJECTIVE AND OBJECTIVE BOX
PGY1 PROGRESS NOTE:    CC: septic Shock due to bilateral cellulitis - course complicated by renal failure (on hemodialysis), stroke, and respiratory failure. (01 Jun 2018 14:51)    OVERNIGHT EVENTS: ELSIE    SUBJECTIVE / INTERVAL HPI: Patient seen and examined at bedside. Patient awakens to voice and able to follow some commands. Unable to assess ROS.     ROS: negative unless otherwise specified    VITAL SIGNS:  T(F): 98.6 (17 Jun 2018 10:00), Max: 100.9 (16 Jun 2018 17:25)  HR: 94 (17 Jun 2018 10:00) (74 - 106)  BP: 125/79 (17 Jun 2018 10:00) (105/70 - 129/74)  RR: 14 (17 Jun 2018 10:00) (14 - 24)  SpO2: 100% (17 Jun 2018 10:00) (97% - 100%)      PHYSICAL EXAM:  Constitutional: thin, cachectic, chromically ill appearing, tracheostomy on ventilator, NAD    HEENT: temporal wasting, PERRLA, EOMI, dry blood on lips and oral mucosa, dry MM  Neck: No JVD, tracheostomy with minimal mucoid discharge  Respiratory: course breath sounds b/l, CTA b/l, no wheeze, rales, rhonchi  Cardiovascular: AICD in place, normal S1S2, no MRG  Gastrointestinal: mildly distended, soft, non-tender, +BS, +PEG tube, +rectal tube with green-brown loose stools  Extremities: hyperpigmented skin on anterior shin RLE, trace dependent edema   Musculoskeletal: increased rigidity in UE, atrophied musculature in b/l LE  Vascular: 1+ peripheral pulses  Neurological: intermittently able to nod head to questions and following commands, moving all extremities spontaneously, no focal deficits  Skin: Unstageable sacral ulcer - dressing overlying, stained with blood. When dressing removed, it is an open, crater-like ulceration.     MEDICATIONS:  MEDICATIONS  (STANDING):  acetaminophen    Suspension. 650 milliGRAM(s) Oral every 8 hours  acetylcysteine 20% Inhalation 4 milliLiter(s) Inhalation two times a day  aspirin  chewable 81 milliGRAM(s) Oral daily  atorvastatin 80 milliGRAM(s) Oral at bedtime  calcitriol  Solution 0.25 MICROGram(s) Oral daily  collagenase Ointment 1 Application(s) Topical daily  dextrose 5%. 1000 milliLiter(s) (50 mL/Hr) IV Continuous <Continuous>  dextrose 50% Injectable 12.5 Gram(s) IV Push once  dextrose 50% Injectable 25 Gram(s) IV Push once  dextrose 50% Injectable 25 Gram(s) IV Push once  escitalopram 5 milliGRAM(s) Oral daily  fluconAZOLE IVPB 200 milliGRAM(s) IV Intermittent every 24 hours  folic acid 1 milliGRAM(s) Oral daily  hydrocortisone 10 milliGRAM(s) Oral every 12 hours  insulin glargine Injectable (LANTUS) 7 Unit(s) SubCutaneous at bedtime  insulin regular  human corrective regimen sliding scale   SubCutaneous <User Schedule>  insulin regular  human recombinant 17 Unit(s) SubCutaneous every 6 hours  levETIRAcetam  Solution 500 milliGRAM(s) Oral every 24 hours  metoclopramide 5 milliGRAM(s) Oral every 12 hours  midodrine 10 milliGRAM(s) Oral every 8 hours  modafinil 100 milliGRAM(s) Oral daily  multivitamin 1 Tablet(s) Oral daily  piperacillin/tazobactam IVPB. 2.25 Gram(s) IV Intermittent every 8 hours    MEDICATIONS  (PRN):  dextrose Gel 1 Dose(s) Oral once PRN Blood Glucose LESS THAN 70 milliGRAM(s)/deciliter  glucagon  Injectable 1 milliGRAM(s) IntraMuscular once PRN Glucose LESS THAN 70 milligrams/deciliter      ALLERGIES:  No Known Allergies      LABS:                                    9.3    33.3  )-----------( 285      ( 17 Jun 2018 08:00 )             31.5     06-17    137  |  96  |  52<H>  ----------------------------<  96  5.0   |  23  |  2.97<H>    Ca    9.4      17 Jun 2018 08:00  Phos  4.9     06-17  Mg     2.2     06-17      PT/INR - ( 17 Jun 2018 08:00 )   PT: >320.0 sec;   INR: >24.00  (likely erroneous, repeat drawn)      CAPILLARY BLOOD GLUCOSE  POCT Blood Glucose.: 144 mg/dL (17 Jun 2018 09:03)  POCT Blood Glucose.: 86 mg/dL (17 Jun 2018 06:36)  POCT Blood Glucose.: 196 mg/dL (17 Jun 2018 00:21)  POCT Blood Glucose.: 232 mg/dL (16 Jun 2018 22:11)  POCT Blood Glucose.: 309 mg/dL (16 Jun 2018 17:44)  POCT Blood Glucose.: 83 mg/dL (16 Jun 2018 13:37)    I&O's Summary  16 Jun 2018 07:01  -  17 Jun 2018 07:00  --------------------------------------------------------  IN: 62 mL / OUT: 1300 mL / NET: -1238 mL    17 Jun 2018 07:01  -  17 Jun 2018 11:24  --------------------------------------------------------  IN: 310 mL / OUT: 0 mL / NET: 310 mL       RADIOLOGY & ADDITIONAL TESTS: Reviewed. PGY1 PROGRESS NOTE:    CC: septic Shock due to bilateral cellulitis - course complicated by renal failure (on hemodialysis), stroke, and respiratory failure. (01 Jun 2018 14:51)    OVERNIGHT EVENTS: ELSIE    SUBJECTIVE / INTERVAL HPI: Patient seen and examined at bedside.     ROS: negative unless otherwise specified    VITAL SIGNS:  T(F): 98.7 (18 Jun 2018 06:47), Max: 99.4 (17 Jun 2018 17:58)  HR: 88 (18 Jun 2018 08:49) (80 - 97)  BP: 108/79 (18 Jun 2018 06:47) (108/79 - 112/77)  RR: 22 (18 Jun 2018 06:47) (12 - 23)  SpO2: 99% (18 Jun 2018 08:49) (99% - 100%)    PHYSICAL EXAM:  Constitutional: thin, cachectic, chromically ill appearing, tracheostomy on ventilator, NAD    HEENT: temporal wasting, PERRLA, EOMI, dry blood on lips and oral mucosa, dry MM  Neck: No JVD, tracheostomy with minimal mucoid discharge  Respiratory: course breath sounds b/l, CTA b/l, no wheeze, rales, rhonchi  Cardiovascular: AICD in place, normal S1S2, no MRG  Gastrointestinal: mildly distended, soft, non-tender, +BS, +PEG tube, +rectal tube with green-brown loose stools  Extremities: hyperpigmented skin on anterior shin RLE, trace dependent edema   Musculoskeletal: increased rigidity in UE, atrophied musculature in b/l LE  Vascular: 1+ peripheral pulses  Neurological: intermittently able to nod head to questions and following commands, moving all extremities spontaneously, no focal deficits  Skin: Unstageable sacral ulcer - dressing overlying, stained with blood. When dressing removed, it is an open, crater-like ulceration.     MEDICATIONS:  MEDICATIONS  (STANDING):  acetaminophen    Suspension. 650 milliGRAM(s) Oral every 8 hours  acetylcysteine 20% Inhalation 4 milliLiter(s) Inhalation two times a day  aspirin  chewable 81 milliGRAM(s) Oral daily  atorvastatin 80 milliGRAM(s) Oral at bedtime  calcitriol  Solution 0.25 MICROGram(s) Oral daily  collagenase Ointment 1 Application(s) Topical daily  dextrose 5%. 1000 milliLiter(s) (50 mL/Hr) IV Continuous <Continuous>  dextrose 50% Injectable 12.5 Gram(s) IV Push once  dextrose 50% Injectable 25 Gram(s) IV Push once  dextrose 50% Injectable 25 Gram(s) IV Push once  escitalopram 5 milliGRAM(s) Oral daily  fluconAZOLE IVPB 200 milliGRAM(s) IV Intermittent every 24 hours  folic acid 1 milliGRAM(s) Oral daily  hydrocortisone 10 milliGRAM(s) Oral every 12 hours  insulin glargine Injectable (LANTUS) 7 Unit(s) SubCutaneous at bedtime  insulin regular  human corrective regimen sliding scale   SubCutaneous <User Schedule>  insulin regular  human recombinant 17 Unit(s) SubCutaneous every 6 hours  levETIRAcetam  Solution 500 milliGRAM(s) Oral every 24 hours  metoclopramide 5 milliGRAM(s) Oral every 12 hours  midodrine 10 milliGRAM(s) Oral every 8 hours  modafinil 100 milliGRAM(s) Oral daily  multivitamin 1 Tablet(s) Oral daily  piperacillin/tazobactam IVPB. 2.25 Gram(s) IV Intermittent every 8 hours    MEDICATIONS  (PRN):  dextrose Gel 1 Dose(s) Oral once PRN Blood Glucose LESS THAN 70 milliGRAM(s)/deciliter  glucagon  Injectable 1 milliGRAM(s) IntraMuscular once PRN Glucose LESS THAN 70 milligrams/deciliter      ALLERGIES:  No Known Allergies      LABS:                              PT/INR - ( 17 Jun 2018 08:00 )   PT: >320.0 sec;   INR: >24.00  (likely erroneous, repeat drawn)      CAPILLARY BLOOD GLUCOSE  POCT Blood Glucose.: 144 mg/dL (17 Jun 2018 09:03)  POCT Blood Glucose.: 86 mg/dL (17 Jun 2018 06:36)  POCT Blood Glucose.: 196 mg/dL (17 Jun 2018 00:21)  POCT Blood Glucose.: 232 mg/dL (16 Jun 2018 22:11)  POCT Blood Glucose.: 309 mg/dL (16 Jun 2018 17:44)  POCT Blood Glucose.: 83 mg/dL (16 Jun 2018 13:37)    I&O's Summary  16 Jun 2018 07:01  -  17 Jun 2018 07:00  --------------------------------------------------------  IN: 62 mL / OUT: 1300 mL / NET: -1238 mL    17 Jun 2018 07:01  -  17 Jun 2018 11:24  --------------------------------------------------------  IN: 310 mL / OUT: 0 mL / NET: 310 mL       RADIOLOGY & ADDITIONAL TESTS: Reviewed. PGY1 PROGRESS NOTE:    CC: septic Shock due to bilateral cellulitis - course complicated by renal failure (on hemodialysis), stroke, and respiratory failure. (01 Jun 2018 14:51)    OVERNIGHT EVENTS: ELSIE    SUBJECTIVE / INTERVAL HPI: Patient seen and examined at bedside. Alert and awake and following commands (wiggling toes). Patient non-verbal and therefore unable to assess ROS.     ROS: negative unless otherwise specified    VITAL SIGNS:  T(F): 98.7 (18 Jun 2018 06:47), Max: 99.4 (17 Jun 2018 17:58)  HR: 88 (18 Jun 2018 08:49) (80 - 97)  BP: 108/79 (18 Jun 2018 06:47) (108/79 - 112/77)  RR: 22 (18 Jun 2018 06:47) (12 - 23)  SpO2: 99% (18 Jun 2018 08:49) (99% - 100%)    PHYSICAL EXAM:  Constitutional: thin, cachectic, chromically ill appearing, tracheostomy on ventilator, NAD    HEENT: temporal wasting, PERRLA, EOMI, dry blood on lips and oral mucosa, dry MM  Neck: No JVD, tracheostomy with minimal mucoid discharge  Respiratory: course breath sounds b/l, CTA b/l, no wheeze, rales, rhonchi  Cardiovascular: AICD in place, normal S1S2, no MRG  Gastrointestinal: mildly distended, soft, non-tender, +BS, +PEG tube, +rectal tube with green-brown loose stools  Extremities: hyperpigmented skin on anterior shin RLE, trace dependent edema, hands in braces to prevent contracture  Musculoskeletal: increased rigidity in UE, atrophied musculature in b/l LE  Vascular: 1+ peripheral pulses  Neurological: intermittently able to follow commands, moving all extremities spontaneously, no focal deficits  Skin: Unstageable sacral ulcer - dressing overlying, stained with blood. When dressing removed, it is an open, crater-like ulceration.     MEDICATIONS:  MEDICATIONS  (STANDING):  acetaminophen    Suspension. 650 milliGRAM(s) Oral every 8 hours  acetylcysteine 20% Inhalation 4 milliLiter(s) Inhalation two times a day  aspirin  chewable 81 milliGRAM(s) Oral daily  atorvastatin 80 milliGRAM(s) Oral at bedtime  calcitriol  Solution 0.25 MICROGram(s) Oral daily  collagenase Ointment 1 Application(s) Topical daily  dextrose 5%. 1000 milliLiter(s) (50 mL/Hr) IV Continuous <Continuous>  dextrose 50% Injectable 12.5 Gram(s) IV Push once  dextrose 50% Injectable 25 Gram(s) IV Push once  dextrose 50% Injectable 25 Gram(s) IV Push once  escitalopram 5 milliGRAM(s) Oral daily  fluconAZOLE IVPB 200 milliGRAM(s) IV Intermittent every 24 hours  folic acid 1 milliGRAM(s) Oral daily  hydrocortisone 10 milliGRAM(s) Oral every 12 hours  insulin glargine Injectable (LANTUS) 7 Unit(s) SubCutaneous at bedtime  insulin regular  human corrective regimen sliding scale   SubCutaneous <User Schedule>  insulin regular  human recombinant 17 Unit(s) SubCutaneous every 6 hours  levETIRAcetam  Solution 500 milliGRAM(s) Oral every 24 hours  metoclopramide 5 milliGRAM(s) Oral every 12 hours  midodrine 10 milliGRAM(s) Oral every 8 hours  modafinil 100 milliGRAM(s) Oral daily  multivitamin 1 Tablet(s) Oral daily  piperacillin/tazobactam IVPB. 2.25 Gram(s) IV Intermittent every 8 hours    MEDICATIONS  (PRN):  dextrose Gel 1 Dose(s) Oral once PRN Blood Glucose LESS THAN 70 milliGRAM(s)/deciliter  glucagon  Injectable 1 milliGRAM(s) IntraMuscular once PRN Glucose LESS THAN 70 milligrams/deciliter      ALLERGIES:  No Known Allergies      LABS:                      RADIOLOGY & ADDITIONAL TESTS: Reviewed.

## 2018-06-18 NOTE — PROGRESS NOTE ADULT - PROBLEM SELECTOR PLAN 1
#Sepsis- Meeting SIRS criteria with leukocytosis and fever yesterday. No longer febrile, however with uptrending WBC. Currently being treated for unsteagable/incurable sacral ulcer with e/o osteo as well as candidal UTI. Uptrending WBC likely 2/2 sacral infection. There was a c/f C difficile given diarrhea, however, c difficile PCR was negative.  - c/w treatment for sacral decub and UTI as below  - consider vancomycin with HD to cover for gram + organisms; however, in this situation where patient has non-surgical sacral wound and poor prognosis, may be little utility in continuing abx at this time as patient likely to develop resistance  - f/u bcxs- NGTD- and fungal cxs    #Sacral decubitus ulcer- unstageable s/p debridement with plastic surgery. Cultures growing multiple organisms including Pseudomonas and E faecium, now on Zosyn per sensitivities. Pelvic MRI showing acute osteomyelitis and loculated collection abutting the bladder. Patient is not a surgical candidate based on his comorbidities and inability lay in prone positioning postop ([id number]). At this time, will not evaluate possible abscess further - will likely recur given poor prognosis.  - c/w Zosyn (6/4-), will d/w family about continuation of abx therapy given that benefit/risk may favor discontinuation to avoid resistance given that ulcer is uncurable   - frequent turning q2h for pressure offloading, air fluid mattress  - wound care with dry dressings with Allevyn over top  - soilage control with rectal tube, benefits outweigh risks at this point   - Nutrition support  - Bcxs NGTD, Fungal Bcxs NGTD    #UTI- Patient with urinary retention requiring chronic indwelling Jose catheter. Urine cultures growing Sherrie Dubliniensis sensitive to Fluconazole.  - C/w Fluconazole 200mg (6/7- 6/21) for 2 week course per ID Meeting SIRS criteria with leukocytosis and fever yesterday. No longer febrile, however with uptrending WBC. Currently being treated for unsteagable/incurable sacral ulcer with e/o osteo as well as candidal UTI. Uptrending WBC likely 2/2 sacral infection. There was a c/f C difficile given diarrhea, however, c difficile PCR was negative.  - c/w treatment for sacral decub and UTI as below  - received 2 doses of Vanc to cover for gram + organisms; also on Zosyn, however, in this situation where patient has non-surgical sacral wound and poor prognosis, may be little utility in continuing abx at this time as patient likely to develop resistance  - f/u bcxs- NGTD- and fungal cxs    #Sacral decubitus ulcer- unstageable s/p debridement with plastic surgery. Cultures growing multiple organisms including Pseudomonas and E faecium, now on Zosyn per sensitivities. Pelvic MRI showing acute osteomyelitis and loculated collection abutting the bladder. Patient is not a surgical candidate based on his comorbidities and inability lay in prone positioning postop ([id number]). At this time, will not evaluate possible abscess further - will likely recur given poor prognosis.  - c/w Zosyn (6/4-), will d/w family about continuation of abx therapy given that benefit/risk may favor discontinuation to avoid resistance given that ulcer is uncurable   - frequent turning q2h for pressure offloading, air fluid mattress  - wound care with dry dressings with Allevyn over top  - soilage control with rectal tube, benefits outweigh risks at this point   - Nutrition support  - Bcxs NGTD, Fungal Bcxs NGTD    #UTI- Patient with urinary retention requiring chronic indwelling Jose catheter. Urine cultures growing Sherrie Dubliniensis sensitive to Fluconazole.  - C/w Fluconazole 200mg (6/7- 6/21) for 2 week course per ID

## 2018-06-19 LAB
ANION GAP SERPL CALC-SCNC: 21 MMOL/L — HIGH (ref 5–17)
BLD GP AB SCN SERPL QL: NEGATIVE — SIGNIFICANT CHANGE UP
BUN SERPL-MCNC: 86 MG/DL — HIGH (ref 7–23)
CALCIUM SERPL-MCNC: 9.3 MG/DL — SIGNIFICANT CHANGE UP (ref 8.4–10.5)
CHLORIDE SERPL-SCNC: 97 MMOL/L — SIGNIFICANT CHANGE UP (ref 96–108)
CO2 SERPL-SCNC: 24 MMOL/L — SIGNIFICANT CHANGE UP (ref 22–31)
CREAT SERPL-MCNC: 4.54 MG/DL — HIGH (ref 0.5–1.3)
GLUCOSE BLDC GLUCOMTR-MCNC: 143 MG/DL — HIGH (ref 70–99)
GLUCOSE BLDC GLUCOMTR-MCNC: 162 MG/DL — HIGH (ref 70–99)
GLUCOSE BLDC GLUCOMTR-MCNC: 171 MG/DL — HIGH (ref 70–99)
GLUCOSE BLDC GLUCOMTR-MCNC: 207 MG/DL — HIGH (ref 70–99)
GLUCOSE BLDC GLUCOMTR-MCNC: 236 MG/DL — HIGH (ref 70–99)
GLUCOSE BLDC GLUCOMTR-MCNC: 287 MG/DL — HIGH (ref 70–99)
GLUCOSE BLDC GLUCOMTR-MCNC: 317 MG/DL — HIGH (ref 70–99)
GLUCOSE SERPL-MCNC: 155 MG/DL — HIGH (ref 70–99)
HCT VFR BLD CALC: 29.4 % — LOW (ref 39–50)
HGB BLD-MCNC: 8.5 G/DL — LOW (ref 13–17)
INR BLD: 3.44 — HIGH (ref 0.88–1.16)
MAGNESIUM SERPL-MCNC: 2.5 MG/DL — SIGNIFICANT CHANGE UP (ref 1.6–2.6)
MCHC RBC-ENTMCNC: 26.2 PG — LOW (ref 27–34)
MCHC RBC-ENTMCNC: 28.9 G/DL — LOW (ref 32–36)
MCV RBC AUTO: 90.5 FL — SIGNIFICANT CHANGE UP (ref 80–100)
PHOSPHATE SERPL-MCNC: 6.7 MG/DL — HIGH (ref 2.5–4.5)
PLATELET # BLD AUTO: 312 K/UL — SIGNIFICANT CHANGE UP (ref 150–400)
POTASSIUM SERPL-MCNC: 6 MMOL/L — HIGH (ref 3.5–5.3)
POTASSIUM SERPL-SCNC: 6 MMOL/L — HIGH (ref 3.5–5.3)
PROTHROM AB SERPL-ACNC: 39.2 SEC — HIGH (ref 9.8–12.7)
RBC # BLD: 3.25 M/UL — LOW (ref 4.2–5.8)
RBC # FLD: 19.2 % — HIGH (ref 10.3–16.9)
RH IG SCN BLD-IMP: POSITIVE — SIGNIFICANT CHANGE UP
SODIUM SERPL-SCNC: 142 MMOL/L — SIGNIFICANT CHANGE UP (ref 135–145)
WBC # BLD: 31.8 K/UL — HIGH (ref 3.8–10.5)
WBC # FLD AUTO: 31.8 K/UL — HIGH (ref 3.8–10.5)

## 2018-06-19 PROCEDURE — 90937 HEMODIALYSIS REPEATED EVAL: CPT | Mod: GC

## 2018-06-19 PROCEDURE — 99233 SBSQ HOSP IP/OBS HIGH 50: CPT | Mod: GC

## 2018-06-19 RX ORDER — ERYTHROPOIETIN 10000 [IU]/ML
10000 INJECTION, SOLUTION INTRAVENOUS; SUBCUTANEOUS ONCE
Qty: 0 | Refills: 0 | Status: COMPLETED | OUTPATIENT
Start: 2018-06-19 | End: 2018-06-19

## 2018-06-19 RX ADMIN — INSULIN HUMAN 17 UNIT(S): 100 INJECTION, SOLUTION SUBCUTANEOUS at 06:59

## 2018-06-19 RX ADMIN — INSULIN GLARGINE 7 UNIT(S): 100 INJECTION, SOLUTION SUBCUTANEOUS at 22:54

## 2018-06-19 RX ADMIN — ESCITALOPRAM OXALATE 5 MILLIGRAM(S): 10 TABLET, FILM COATED ORAL at 14:30

## 2018-06-19 RX ADMIN — INSULIN HUMAN 17 UNIT(S): 100 INJECTION, SOLUTION SUBCUTANEOUS at 14:30

## 2018-06-19 RX ADMIN — Medication 650 MILLIGRAM(S): at 08:00

## 2018-06-19 RX ADMIN — INSULIN HUMAN 2: 100 INJECTION, SOLUTION SUBCUTANEOUS at 06:58

## 2018-06-19 RX ADMIN — Medication 10 MILLIGRAM(S): at 14:31

## 2018-06-19 RX ADMIN — PIPERACILLIN AND TAZOBACTAM 200 GRAM(S): 4; .5 INJECTION, POWDER, LYOPHILIZED, FOR SOLUTION INTRAVENOUS at 22:04

## 2018-06-19 RX ADMIN — INSULIN HUMAN 4: 100 INJECTION, SOLUTION SUBCUTANEOUS at 01:11

## 2018-06-19 RX ADMIN — MIDODRINE HYDROCHLORIDE 10 MILLIGRAM(S): 2.5 TABLET ORAL at 06:56

## 2018-06-19 RX ADMIN — INSULIN HUMAN 17 UNIT(S): 100 INJECTION, SOLUTION SUBCUTANEOUS at 01:12

## 2018-06-19 RX ADMIN — PIPERACILLIN AND TAZOBACTAM 200 GRAM(S): 4; .5 INJECTION, POWDER, LYOPHILIZED, FOR SOLUTION INTRAVENOUS at 07:03

## 2018-06-19 RX ADMIN — FLUCONAZOLE 100 MILLIGRAM(S): 150 TABLET ORAL at 14:38

## 2018-06-19 RX ADMIN — Medication 5 MILLIGRAM(S): at 19:05

## 2018-06-19 RX ADMIN — Medication 10 MILLIGRAM(S): at 01:12

## 2018-06-19 RX ADMIN — Medication 1 MILLIGRAM(S): at 14:32

## 2018-06-19 RX ADMIN — Medication 1 TABLET(S): at 14:31

## 2018-06-19 RX ADMIN — INSULIN HUMAN 2: 100 INJECTION, SOLUTION SUBCUTANEOUS at 19:06

## 2018-06-19 RX ADMIN — Medication 81 MILLIGRAM(S): at 14:31

## 2018-06-19 RX ADMIN — ERYTHROPOIETIN 10000 UNIT(S): 10000 INJECTION, SOLUTION INTRAVENOUS; SUBCUTANEOUS at 12:09

## 2018-06-19 RX ADMIN — Medication 4 MILLILITER(S): at 06:56

## 2018-06-19 RX ADMIN — Medication 650 MILLIGRAM(S): at 14:38

## 2018-06-19 RX ADMIN — MODAFINIL 100 MILLIGRAM(S): 200 TABLET ORAL at 14:30

## 2018-06-19 RX ADMIN — Medication 4 MILLILITER(S): at 19:06

## 2018-06-19 RX ADMIN — Medication 1 APPLICATION(S): at 14:32

## 2018-06-19 RX ADMIN — Medication 650 MILLIGRAM(S): at 15:04

## 2018-06-19 RX ADMIN — Medication 5 MILLIGRAM(S): at 06:56

## 2018-06-19 RX ADMIN — PIPERACILLIN AND TAZOBACTAM 200 GRAM(S): 4; .5 INJECTION, POWDER, LYOPHILIZED, FOR SOLUTION INTRAVENOUS at 14:29

## 2018-06-19 RX ADMIN — INSULIN HUMAN 17 UNIT(S): 100 INJECTION, SOLUTION SUBCUTANEOUS at 19:06

## 2018-06-19 RX ADMIN — ATORVASTATIN CALCIUM 80 MILLIGRAM(S): 80 TABLET, FILM COATED ORAL at 22:04

## 2018-06-19 RX ADMIN — Medication 650 MILLIGRAM(S): at 06:57

## 2018-06-19 RX ADMIN — LEVETIRACETAM 500 MILLIGRAM(S): 250 TABLET, FILM COATED ORAL at 14:30

## 2018-06-19 RX ADMIN — Medication 650 MILLIGRAM(S): at 22:03

## 2018-06-19 RX ADMIN — MIDODRINE HYDROCHLORIDE 10 MILLIGRAM(S): 2.5 TABLET ORAL at 22:04

## 2018-06-19 RX ADMIN — MIDODRINE HYDROCHLORIDE 10 MILLIGRAM(S): 2.5 TABLET ORAL at 14:31

## 2018-06-19 NOTE — PROGRESS NOTE ADULT - PROBLEM SELECTOR PLAN 1
Meeting SIRS criteria with leukocytosis and fever on 6/15. S/p 2 doses of vancomycin. No longer febrile, and WBC downtrending. Source likely unsteagable/incurable sacral ulcer with e/o osteo. C difficile was ruled out.   - c/w treatment for sacral decub and UTI as below  - received 2 doses of Vanc to cover for gram + organisms; also on Zosyn, however, in this situation where patient has non-surgical sacral wound and poor prognosis, may be little utility in continuing abx at this time as patient likely to develop resistance  - f/u bcxs NGTD- and fungal cxs pending    #Sacral decubitus ulcer- unstageable s/p debridement with plastic surgery. Cultures growing multiple organisms including Pseudomonas and E faecium, now on Zosyn per sensitivities. Pelvic MRI showing acute osteomyelitis and loculated collection abutting the bladder. Patient is not a surgical candidate based on his comorbidities and inability lay in prone positioning post-op ([id number]). At this time, will not evaluate possible abscess further - will likely recur given poor prognosis.  - c/w Zosyn (6/4-), will d/w family about continuation of abx therapy given that benefit/risk may favor discontinuation to avoid resistance given that ulcer is uncurable   - frequent turning q2h for pressure offloading, air fluid mattress  - wound care with dry dressings with Allevyn over top  - soilage control with rectal tube, benefits outweigh risks at this point   - Nutrition support  - Bcxs NGTD, Fungal Bcxs NGTD    #UTI- Patient with urinary retention requiring chronic indwelling Jose catheter. Urine cultures growing Sherrie Dubliniensis sensitive to Fluconazole.  - C/w Fluconazole 200mg (6/7- 6/21) for 2 week course per ID

## 2018-06-19 NOTE — PROGRESS NOTE ADULT - SUBJECTIVE AND OBJECTIVE BOX
PGY1 PROGRESS NOTE:    CC: septic Shock due to bilateral cellulitis - course complicated by renal failure (on hemodialysis), stroke, and respiratory failure. (01 Jun 2018 14:51)    OVERNIGHT EVENTS: ELSIE    SUBJECTIVE / INTERVAL HPI: Family meeting planned for Thursday at 10am. Patient seen and examined at bedside. Alert and awake and following commands (wiggling toes). Patient non-verbal and therefore unable to assess ROS.     ROS: negative unless otherwise specified    VITAL SIGNS:  T(F): 98.7 (18 Jun 2018 06:47), Max: 99.4 (17 Jun 2018 17:58)  HR: 88 (18 Jun 2018 08:49) (80 - 97)  BP: 108/79 (18 Jun 2018 06:47) (108/79 - 112/77)  RR: 22 (18 Jun 2018 06:47) (12 - 23)  SpO2: 99% (18 Jun 2018 08:49) (99% - 100%)    PHYSICAL EXAM:  Constitutional: thin, cachectic, chronically ill appearing, tracheostomy on ventilator, NAD    HEENT: temporal wasting, PERRLA, EOMI, dry MM  Neck: No JVD, tracheostomy with minimal mucoid discharge  Respiratory: transmitted upper resp sounds which improved after suctioning course breath sounds b/l, rhonchi b/l  Cardiovascular: AICD in place, normal S1S2, no MRG  Gastrointestinal: non-distended, soft, +BS, +PEG tube, +rectal tube with green-brown loose stools  Extremities: hyperpigmented skin on anterior shin RLE, trace dependent edema, hands in braces to prevent contracture  Musculoskeletal: increased rigidity in UE, atrophied musculature in b/l LE  Vascular: 1+ peripheral pulses  Neurological: intermittently able to follow commands, moving all extremities spontaneously, no focal deficits  Skin: Unstageable sacral ulcer - dressing overlying, stained with blood. When dressing removed, it is an open, crater-like ulceration.     MEDICATIONS:  MEDICATIONS  (STANDING):  acetaminophen    Suspension. 650 milliGRAM(s) Oral every 8 hours  acetylcysteine 20% Inhalation 4 milliLiter(s) Inhalation two times a day  aspirin  chewable 81 milliGRAM(s) Oral daily  atorvastatin 80 milliGRAM(s) Oral at bedtime  calcitriol  Solution 0.25 MICROGram(s) Oral daily  collagenase Ointment 1 Application(s) Topical daily  dextrose 5%. 1000 milliLiter(s) (50 mL/Hr) IV Continuous <Continuous>  dextrose 50% Injectable 12.5 Gram(s) IV Push once  dextrose 50% Injectable 25 Gram(s) IV Push once  dextrose 50% Injectable 25 Gram(s) IV Push once  escitalopram 5 milliGRAM(s) Oral daily  fluconAZOLE IVPB 200 milliGRAM(s) IV Intermittent every 24 hours  folic acid 1 milliGRAM(s) Oral daily  hydrocortisone 10 milliGRAM(s) Oral every 12 hours  insulin glargine Injectable (LANTUS) 7 Unit(s) SubCutaneous at bedtime  insulin regular  human corrective regimen sliding scale   SubCutaneous <User Schedule>  insulin regular  human recombinant 17 Unit(s) SubCutaneous every 6 hours  levETIRAcetam  Solution 500 milliGRAM(s) Oral every 24 hours  metoclopramide 5 milliGRAM(s) Oral every 12 hours  midodrine 10 milliGRAM(s) Oral every 8 hours  modafinil 100 milliGRAM(s) Oral daily  multivitamin 1 Tablet(s) Oral daily  piperacillin/tazobactam IVPB. 2.25 Gram(s) IV Intermittent every 8 hours    MEDICATIONS  (PRN):  dextrose Gel 1 Dose(s) Oral once PRN Blood Glucose LESS THAN 70 milliGRAM(s)/deciliter  glucagon  Injectable 1 milliGRAM(s) IntraMuscular once PRN Glucose LESS THAN 70 milligrams/deciliter      ALLERGIES:  No Known Allergies      LABS:                                   8.5    31.8  )-----------( 312      ( 19 Jun 2018 11:23 )             29.4     06-19    142  |  97  |  86<H>  ----------------------------<  155<H>  6.0<H>   |  24  |  4.54<H>    Ca    9.3      19 Jun 2018 11:23  Phos  6.7     06-19  Mg     2.5     06-19    PT/INR - ( 19 Jun 2018 06:37 )   PT: 39.2 sec;   INR: 3.44         CAPILLARY BLOOD GLUCOSE  POCT Blood Glucose.: 143 mg/dL (19 Jun 2018 14:16)  POCT Blood Glucose.: 236 mg/dL (19 Jun 2018 06:05)  POCT Blood Glucose.: 287 mg/dL (19 Jun 2018 03:35)  POCT Blood Glucose.: 317 mg/dL (19 Jun 2018 01:09)  POCT Blood Glucose.: 237 mg/dL (18 Jun 2018 22:48)  POCT Blood Glucose.: 235 mg/dL (18 Jun 2018 17:29)               RADIOLOGY & ADDITIONAL TESTS: Reviewed.

## 2018-06-19 NOTE — PROGRESS NOTE ADULT - PROBLEM SELECTOR PLAN 1
ESRD TTS now  Volume and chemistries acceptable  Proceed with HD today as ordered     f/u goals of care     May not be beneficial for AVF placement given current comorbidities and active infections.

## 2018-06-19 NOTE — CHART NOTE - NSCHARTNOTEFT_GEN_A_CORE
Admitting Diagnosis:   63M PMH HFrEF 10-15% (ischemic), MI, s/p AICD vs PPM, possible Afib, HTN, DM2 on insulin, possible CKD, and gout, who presents with a chief complaint of generalized weakness s/p septic shock likely 2/2 LE cellulitis. AMS and new leukocytisis likely 2/2 UTI. Trach and PEG dependent. Continues to receive HD TIW. Stable on RMF      PAST MEDICAL & SURGICAL HISTORY:  Type 2 diabetes mellitus with diabetic peripheral angiopathy without gangrene, with long-term curren  Essential hypertension, benign  Gout  Pacemaker  Chronic systolic heart failure  Myocardial infarction  No significant past surgical history    Current Nutrition Order:   Jevity 1.5 Terrell @ 62mL/hr x 24hrs plus ProStat Sugar Free BID (200 kcal, 30g protein) via PEG to provide in total: 1488 mL TV, 2432 kcal, 125g protein, 1131 mL free H2O, 148% RDI, 1.40g/kg IBW protein.      PO Intake: Good (%) [   ]  Fair (50-75%) [   ] Poor (<25%) [   ]- N/A NPO with TF    GI Issues: Rectal tube in place for diarrhea     Pain: Unable to assess at this time 2/2 vent     Skin Integrity:  L buttock unstageable; R. buttock II , sacrum unstageable   L calf venous ulcer  Trach stage 3 PU  L. UE eschar     Labs:       142  |  97  |  86<H>  ----------------------------<  155<H>  6.0<H>   |  24  |  4.54<H>    Ca    9.3      2018 11:23  Phos  6.7       Mg     2.5           CAPILLARY BLOOD GLUCOSE      POCT Blood Glucose.: 236 mg/dL (2018 06:05)  POCT Blood Glucose.: 287 mg/dL (2018 03:35)  POCT Blood Glucose.: 317 mg/dL (2018 01:09)  POCT Blood Glucose.: 237 mg/dL (2018 22:48)  POCT Blood Glucose.: 235 mg/dL (2018 17:29)      Medications:  MEDICATIONS  (STANDING):  acetaminophen    Suspension. 650 milliGRAM(s) Oral every 8 hours  acetylcysteine 20% Inhalation 4 milliLiter(s) Inhalation two times a day  aspirin  chewable 81 milliGRAM(s) Oral daily  atorvastatin 80 milliGRAM(s) Oral at bedtime  collagenase Ointment 1 Application(s) Topical daily  dextrose 5%. 1000 milliLiter(s) (50 mL/Hr) IV Continuous <Continuous>  dextrose 50% Injectable 12.5 Gram(s) IV Push once  dextrose 50% Injectable 25 Gram(s) IV Push once  dextrose 50% Injectable 25 Gram(s) IV Push once  escitalopram 5 milliGRAM(s) Oral daily  fluconAZOLE IVPB 200 milliGRAM(s) IV Intermittent every 24 hours  folic acid 1 milliGRAM(s) Oral daily  hydrocortisone 10 milliGRAM(s) Oral every 12 hours  insulin glargine Injectable (LANTUS) 7 Unit(s) SubCutaneous at bedtime  insulin regular  human corrective regimen sliding scale   SubCutaneous <User Schedule>  insulin regular  human recombinant 17 Unit(s) SubCutaneous every 6 hours  levETIRAcetam  Solution 500 milliGRAM(s) Oral every 24 hours  metoclopramide 5 milliGRAM(s) Oral every 12 hours  midodrine 10 milliGRAM(s) Oral every 8 hours  modafinil 100 milliGRAM(s) Oral daily  multivitamin 1 Tablet(s) Oral daily  piperacillin/tazobactam IVPB. 2.25 Gram(s) IV Intermittent every 8 hours    MEDICATIONS  (PRN):  dextrose Gel 1 Dose(s) Oral once PRN Blood Glucose LESS THAN 70 milliGRAM(s)/deciliter  glucagon  Injectable 1 milliGRAM(s) IntraMuscular once PRN Glucose LESS THAN 70 milligrams/deciliter      Weight:  Daily     Daily Weight in k.5 (2018 11:05)    Weight:  66kg ()  71.1kg ()  74.3kg ()  74.6kg ()  80.3kg ()  85.6kg ()  86.8kg ()  86.2kg (5/3)  85.1kg ()  79.1kg ()  75.9kg ()  77.4kg ()  72.7kg ()  77.2kg ()  80.9 kg ()  84.5kg (3/31)  86.9kg (3/19)  94.7 kg (3/16)    Weight Change:   Weight fluctuating throughout admission d/t HD, TF inconsistencies    Estimated energy needs using 89 kg IBW; Needs estimated / vent/post-op/PU/HD  Calories: 25-30 kcal/kg = 8002-0573 kcal/day  Protein: 1.4-1.6 g/kg = 125-142 g protein/day  Fluids: per team  HD     Subjective:   S/p trach and PEG placements on . S/p permacath replacement on . Stable on RMF at this time. Course c/b acute osteomyelitis 2/2 infected sacral decubitus ulcer, as seen on pelvic MRI. Pt seen in room, awake with eyes open but not verbally responsive. Trached to vent on VC/AC mode. Currently undergoing HD w/good tolerance per dialysis RN. TF running at current goal of 62mL/hr- continues to have diarrhea (c.diff negative). Plan for family meeting this week per RN.     Previous Nutrition Diagnosis:   Increased protein-calorie needs RT increased demand for protein-calorie intake AEB on vent support, ESRD on HD, malnutrition     Active [X]  Resolved [   ]    New PES statement:     Goal:   Continue to meet % of nutrition needs via tolerated route.     Recommendations:  1. Recommend increasing EN to Jevity 1.5 Terrell @ 68mL/hr x 24hrs plus ProStat Sugar Free BID (200 kcal, 30g protein) to provide: 1632 mL TV, 2648 kcal, 134g protein, 1240mL free H2O, 163% RDI, 1.52g/kg IBW protein. Monitor for s/s intolerance; maintain aspiration precautions at all times   *endorsed to MD  2. Continue to trend weights pre/post HD  3. Monitor POC BG and provide adequate insulin coverage  4. Continue to monitor for GOC and keep nutrition in line at all times     Education:   N/A-vent    Risk Level: High [  ] Moderate [ X  ] Low [   ]

## 2018-06-19 NOTE — PROGRESS NOTE ADULT - SUBJECTIVE AND OBJECTIVE BOX
Patient seen and examined at bedside.     Chief Complaint: Need for dialysis, abnormal labs, buttock ulcers     Interval events reviewed  Due for HD today   Has had about a 2L or so interdialytic inputs  Minimal oral inputs otherwise   Unable to obtain other history     acetaminophen    Suspension. 650 milliGRAM(s) every 8 hours  acetylcysteine 20% Inhalation 4 milliLiter(s) two times a day  aspirin  chewable 81 milliGRAM(s) daily  atorvastatin 80 milliGRAM(s) at bedtime  collagenase Ointment 1 Application(s) daily  dextrose 5%. 1000 milliLiter(s) <Continuous>  dextrose 50% Injectable 12.5 Gram(s) once  dextrose 50% Injectable 25 Gram(s) once  dextrose 50% Injectable 25 Gram(s) once  dextrose Gel 1 Dose(s) once PRN  epoetin amy Injectable 50195 Unit(s) once  escitalopram 5 milliGRAM(s) daily  fluconAZOLE IVPB 200 milliGRAM(s) every 24 hours  folic acid 1 milliGRAM(s) daily  glucagon  Injectable 1 milliGRAM(s) once PRN  hydrocortisone 10 milliGRAM(s) every 12 hours  insulin glargine Injectable (LANTUS) 7 Unit(s) at bedtime  insulin regular  human corrective regimen sliding scale   <User Schedule>  insulin regular  human recombinant 17 Unit(s) every 6 hours  levETIRAcetam  Solution 500 milliGRAM(s) every 24 hours  metoclopramide 5 milliGRAM(s) every 12 hours  midodrine 10 milliGRAM(s) every 8 hours  modafinil 100 milliGRAM(s) daily  multivitamin 1 Tablet(s) daily  piperacillin/tazobactam IVPB. 2.25 Gram(s) every 8 hours      Allergies    No Known Allergies    Intolerances        T(C): , Max: 37.1 (06-18-18 @ 21:02)  T(F): , Max: 98.7 (06-18-18 @ 21:02)  HR: 94 (06-19-18 @ 09:28)  BP: 102/64 (06-19-18 @ 09:28)  BP(mean): --  RR: 21 (06-19-18 @ 09:28)  SpO2: 100% (06-19-18 @ 09:28)  Wt(kg): --    06-18 @ 07:01  -  06-19 @ 07:00  --------------------------------------------------------  IN:    Jevity: 1488 mL    Solution: 400 mL  Total IN: 1888 mL    OUT:    Rectal Tube: 600 mL  Total OUT: 600 mL    Total NET: 1288 mL              LABS:            PT/INR - ( 19 Jun 2018 06:37 )   PT: 39.2 sec;   INR: 3.44                    RADIOLOGY & ADDITIONAL STUDIES:

## 2018-06-19 NOTE — PROGRESS NOTE ADULT - SUBJECTIVE AND OBJECTIVE BOX
Patient was seen and evaluated on dialysis.   Patient is tolerating the procedure well.   HR: 102 (06-19-18 @ 11:05)  BP: 92/71 (06-19-18 @ 11:05)  Continue dialysis:   Dialyzer:   Revaclear 300      QB:  400      QD: 500 2K   Goal UF 0.5kg over 3 Hours

## 2018-06-20 LAB
GLUCOSE BLDC GLUCOMTR-MCNC: 128 MG/DL — HIGH (ref 70–99)
GLUCOSE BLDC GLUCOMTR-MCNC: 149 MG/DL — HIGH (ref 70–99)
GLUCOSE BLDC GLUCOMTR-MCNC: 199 MG/DL — HIGH (ref 70–99)
GLUCOSE BLDC GLUCOMTR-MCNC: 295 MG/DL — HIGH (ref 70–99)
GLUCOSE BLDC GLUCOMTR-MCNC: 95 MG/DL — SIGNIFICANT CHANGE UP (ref 70–99)
INR BLD: 2.61 — HIGH (ref 0.88–1.16)
PROTHROM AB SERPL-ACNC: 29.6 SEC — HIGH (ref 9.8–12.7)

## 2018-06-20 PROCEDURE — 99233 SBSQ HOSP IP/OBS HIGH 50: CPT | Mod: GC

## 2018-06-20 PROCEDURE — 99232 SBSQ HOSP IP/OBS MODERATE 35: CPT | Mod: GC

## 2018-06-20 RX ORDER — ACETAMINOPHEN 500 MG
1000 TABLET ORAL ONCE
Qty: 0 | Refills: 0 | Status: COMPLETED | OUTPATIENT
Start: 2018-06-20 | End: 2018-06-20

## 2018-06-20 RX ADMIN — LEVETIRACETAM 500 MILLIGRAM(S): 250 TABLET, FILM COATED ORAL at 13:54

## 2018-06-20 RX ADMIN — ESCITALOPRAM OXALATE 5 MILLIGRAM(S): 10 TABLET, FILM COATED ORAL at 12:11

## 2018-06-20 RX ADMIN — Medication 1 MILLIGRAM(S): at 12:11

## 2018-06-20 RX ADMIN — Medication 650 MILLIGRAM(S): at 13:06

## 2018-06-20 RX ADMIN — Medication 81 MILLIGRAM(S): at 12:12

## 2018-06-20 RX ADMIN — Medication 4 MILLILITER(S): at 06:50

## 2018-06-20 RX ADMIN — PIPERACILLIN AND TAZOBACTAM 200 GRAM(S): 4; .5 INJECTION, POWDER, LYOPHILIZED, FOR SOLUTION INTRAVENOUS at 06:42

## 2018-06-20 RX ADMIN — ATORVASTATIN CALCIUM 80 MILLIGRAM(S): 80 TABLET, FILM COATED ORAL at 21:48

## 2018-06-20 RX ADMIN — INSULIN HUMAN 3: 100 INJECTION, SOLUTION SUBCUTANEOUS at 23:55

## 2018-06-20 RX ADMIN — Medication 1 TABLET(S): at 12:11

## 2018-06-20 RX ADMIN — Medication 400 MILLIGRAM(S): at 01:30

## 2018-06-20 RX ADMIN — INSULIN GLARGINE 7 UNIT(S): 100 INJECTION, SOLUTION SUBCUTANEOUS at 23:54

## 2018-06-20 RX ADMIN — MIDODRINE HYDROCHLORIDE 10 MILLIGRAM(S): 2.5 TABLET ORAL at 21:48

## 2018-06-20 RX ADMIN — Medication 650 MILLIGRAM(S): at 13:36

## 2018-06-20 RX ADMIN — Medication 10 MILLIGRAM(S): at 00:16

## 2018-06-20 RX ADMIN — MIDODRINE HYDROCHLORIDE 10 MILLIGRAM(S): 2.5 TABLET ORAL at 06:50

## 2018-06-20 RX ADMIN — Medication 650 MILLIGRAM(S): at 06:50

## 2018-06-20 RX ADMIN — INSULIN HUMAN 17 UNIT(S): 100 INJECTION, SOLUTION SUBCUTANEOUS at 12:13

## 2018-06-20 RX ADMIN — Medication 650 MILLIGRAM(S): at 23:00

## 2018-06-20 RX ADMIN — MIDODRINE HYDROCHLORIDE 10 MILLIGRAM(S): 2.5 TABLET ORAL at 13:07

## 2018-06-20 RX ADMIN — INSULIN HUMAN 17 UNIT(S): 100 INJECTION, SOLUTION SUBCUTANEOUS at 00:17

## 2018-06-20 RX ADMIN — Medication 5 MILLIGRAM(S): at 18:12

## 2018-06-20 RX ADMIN — Medication 4 MILLILITER(S): at 18:12

## 2018-06-20 RX ADMIN — Medication 1 APPLICATION(S): at 12:13

## 2018-06-20 RX ADMIN — INSULIN HUMAN 1: 100 INJECTION, SOLUTION SUBCUTANEOUS at 00:16

## 2018-06-20 RX ADMIN — PIPERACILLIN AND TAZOBACTAM 200 GRAM(S): 4; .5 INJECTION, POWDER, LYOPHILIZED, FOR SOLUTION INTRAVENOUS at 13:07

## 2018-06-20 RX ADMIN — INSULIN HUMAN 17 UNIT(S): 100 INJECTION, SOLUTION SUBCUTANEOUS at 06:51

## 2018-06-20 RX ADMIN — PIPERACILLIN AND TAZOBACTAM 200 GRAM(S): 4; .5 INJECTION, POWDER, LYOPHILIZED, FOR SOLUTION INTRAVENOUS at 21:48

## 2018-06-20 RX ADMIN — Medication 650 MILLIGRAM(S): at 22:00

## 2018-06-20 RX ADMIN — MODAFINIL 100 MILLIGRAM(S): 200 TABLET ORAL at 12:12

## 2018-06-20 RX ADMIN — Medication 5 MILLIGRAM(S): at 06:50

## 2018-06-20 RX ADMIN — Medication 10 MILLIGRAM(S): at 12:12

## 2018-06-20 RX ADMIN — FLUCONAZOLE 100 MILLIGRAM(S): 150 TABLET ORAL at 14:53

## 2018-06-20 NOTE — PROGRESS NOTE ADULT - PROBLEM SELECTOR PLAN 1
Meeting SIRS criteria with leukocytosis and fever on 6/15. S/p 2 doses of vancomycin. No longer febrile, and WBC downtrending. Source likely unsteagable/incurable sacral ulcer with e/o osteo. C difficile was ruled out.   - c/w treatment for sacral decub and UTI as below  - received 2 doses of Vanc to cover for gram + organisms; also on Zosyn, however, in this situation where patient has non-surgical sacral wound and poor prognosis, may be little utility in continuing abx at this time as patient likely to develop resistance  - f/u bcxs NGTD- and fungal cxs pending    #Sacral decubitus ulcer- unstageable s/p debridement with plastic surgery. Cultures growing multiple organisms including Pseudomonas and E faecium, now on Zosyn per sensitivities. Pelvic MRI showing acute osteomyelitis and loculated collection abutting the bladder. Patient is not a surgical candidate based on his comorbidities and inability lay in prone positioning post-op ([id number]). At this time, will not evaluate possible abscess further - will likely recur given poor prognosis.  - c/w Zosyn (6/4-), will d/w family about continuation of abx therapy given that benefit/risk may favor discontinuation to avoid resistance given that ulcer is uncurable   - frequent turning q2h for pressure offloading, air fluid mattress  - wound care with dry dressings with Allevyn over top  - soilage control with rectal tube, benefits outweigh risks at this point   - Nutrition support  - Bcxs NGTD, Fungal Bcxs NGTD    #UTI- Patient with urinary retention requiring chronic indwelling Jose catheter. Urine cultures growing Sherrie Dubliniensis sensitive to Fluconazole.  - C/w Fluconazole 200mg (6/7- 6/21) for 2 week course per ID

## 2018-06-20 NOTE — PROGRESS NOTE ADULT - SUBJECTIVE AND OBJECTIVE BOX
PGY1 PROGRESS NOTE:    CC: septic Shock due to bilateral cellulitis - course complicated by renal failure (on hemodialysis), stroke, and respiratory failure. (01 Jun 2018 14:51)    OVERNIGHT EVENTS: ELSIE    SUBJECTIVE / INTERVAL HPI: Patient seen and examined at bedside. Unchanged exam. Following commands only by moving toes.    ROS: negative unless otherwise specified    VITAL SIGNS:  Vital Signs Last 24 Hrs  T(C): 37.1 (20 Jun 2018 05:45), Max: 38.1 (19 Jun 2018 21:58)  T(F): 98.7 (20 Jun 2018 05:45), Max: 100.5 (19 Jun 2018 21:58)  HR: 91 (20 Jun 2018 05:45) (80 - 114)  BP: 90/62 (20 Jun 2018 05:45) (90/62 - 130/84)  RR: 23 (20 Jun 2018 05:45) (16 - 28)  SpO2: 100% (20 Jun 2018 05:45) (97% - 100%)    PHYSICAL EXAM:  Constitutional: thin, cachectic, chronically ill appearing, tracheostomy on ventilator, NAD    HEENT: temporal wasting, PERRLA, EOMI, dry MM  Neck: No JVD, tracheostomy with minimal mucoid discharge  Respiratory: transmitted upper resp sounds which improved after suctioning course breath sounds b/l, rhonchi b/l  Cardiovascular: AICD in place, normal S1S2, no MRG  Gastrointestinal: non-distended, soft, +BS, +PEG tube, +rectal tube with green-brown loose stools  Extremities: hyperpigmented skin on anterior shin RLE, trace dependent edema, hands in braces to prevent contracture  Musculoskeletal: increased rigidity in UE, atrophied musculature in b/l LE  Vascular: 1+ peripheral pulses  Neurological: intermittently able to follow commands, moving all extremities spontaneously, no focal deficits  Skin: Unstageable sacral ulcer - dressing overlying, stained with blood. When dressing removed, it is an open, crater-like ulceration.     MEDICATIONS:  MEDICATIONS  (STANDING):  acetaminophen    Suspension. 650 milliGRAM(s) Oral every 8 hours  acetylcysteine 20% Inhalation 4 milliLiter(s) Inhalation two times a day  aspirin  chewable 81 milliGRAM(s) Oral daily  atorvastatin 80 milliGRAM(s) Oral at bedtime  collagenase Ointment 1 Application(s) Topical daily  dextrose 5%. 1000 milliLiter(s) (50 mL/Hr) IV Continuous <Continuous>  dextrose 50% Injectable 12.5 Gram(s) IV Push once  dextrose 50% Injectable 25 Gram(s) IV Push once  dextrose 50% Injectable 25 Gram(s) IV Push once  escitalopram 5 milliGRAM(s) Oral daily  fluconAZOLE IVPB 200 milliGRAM(s) IV Intermittent every 24 hours  folic acid 1 milliGRAM(s) Oral daily  hydrocortisone 10 milliGRAM(s) Oral every 12 hours  insulin glargine Injectable (LANTUS) 7 Unit(s) SubCutaneous at bedtime  insulin regular  human corrective regimen sliding scale   SubCutaneous <User Schedule>  insulin regular  human recombinant 17 Unit(s) SubCutaneous every 6 hours  levETIRAcetam  Solution 500 milliGRAM(s) Oral every 24 hours  metoclopramide 5 milliGRAM(s) Oral every 12 hours  midodrine 10 milliGRAM(s) Oral every 8 hours  modafinil 100 milliGRAM(s) Oral daily  multivitamin 1 Tablet(s) Oral daily  piperacillin/tazobactam IVPB. 2.25 Gram(s) IV Intermittent every 8 hours    MEDICATIONS  (PRN):  dextrose Gel 1 Dose(s) Oral once PRN Blood Glucose LESS THAN 70 milliGRAM(s)/deciliter  glucagon  Injectable 1 milliGRAM(s) IntraMuscular once PRN Glucose LESS THAN 70 milligrams/deciliter      ALLERGIES:  No Known Allergies      LABS:                 CAPILLARY BLOOD GLUCOSE  POCT Blood Glucose.: 171 mg/dL (19 Jun 2018 23:58)  POCT Blood Glucose.: 162 mg/dL (19 Jun 2018 22:47)  POCT Blood Glucose.: 207 mg/dL (19 Jun 2018 19:03)  POCT Blood Glucose.: 143 mg/dL (19 Jun 2018 14:16)    I&O's Summary    18 Jun 2018 07:01  -  19 Jun 2018 07:00  --------------------------------------------------------  IN: 1888 mL / OUT: 600 mL / NET: 1288 mL    19 Jun 2018 07:01  -  20 Jun 2018 06:44  --------------------------------------------------------  IN: 2826 mL / OUT: 2379 mL / NET: 447 mL               RADIOLOGY & ADDITIONAL TESTS: Reviewed. PGY1 PROGRESS NOTE:    CC: septic Shock due to bilateral cellulitis - course complicated by renal failure (on hemodialysis), stroke, and respiratory failure. (01 Jun 2018 14:51)    OVERNIGHT EVENTS: ELSIE    SUBJECTIVE / INTERVAL HPI: Patient seen and examined at bedside. Unchanged exam. Following commands only by moving toes.    ROS: negative unless otherwise specified    VITAL SIGNS:  Vital Signs Last 24 Hrs  T(C): 37.1 (20 Jun 2018 05:45), Max: 38.1 (19 Jun 2018 21:58)  T(F): 98.7 (20 Jun 2018 05:45), Max: 100.5 (19 Jun 2018 21:58)  HR: 91 (20 Jun 2018 05:45) (80 - 114)  BP: 90/62 (20 Jun 2018 05:45) (90/62 - 130/84)  RR: 23 (20 Jun 2018 05:45) (16 - 28)  SpO2: 100% (20 Jun 2018 05:45) (97% - 100%)    PHYSICAL EXAM:  Constitutional: thin, cachectic, chronically ill appearing, tracheostomy on ventilator, NAD    HEENT: temporal wasting, PERRLA, EOMI, dry MM  Neck: No JVD, tracheostomy with minimal mucoid discharge  Respiratory: transmitted upper resp sounds which improved after suctioning course breath sounds b/l, rhonchi b/l  Cardiovascular: AICD in place, normal S1S2, no MRG  Gastrointestinal: non-distended, soft, +BS, +PEG tube, +rectal tube with green-brown loose stools  Extremities: hyperpigmented skin on anterior shin RLE, trace dependent edema, hands in braces to prevent contracture  Musculoskeletal: increased rigidity in UE, atrophied musculature in b/l LE  Vascular: 1+ peripheral pulses  Neurological: intermittently able to follow commands, moving all extremities spontaneously, no focal deficits  Skin: Unstageable sacral ulcer - dressing overlying, stained with blood. When dressing removed, it is an open, crater-like ulceration.     MEDICATIONS:  MEDICATIONS  (STANDING):  acetaminophen    Suspension. 650 milliGRAM(s) Oral every 8 hours  acetylcysteine 20% Inhalation 4 milliLiter(s) Inhalation two times a day  aspirin  chewable 81 milliGRAM(s) Oral daily  atorvastatin 80 milliGRAM(s) Oral at bedtime  collagenase Ointment 1 Application(s) Topical daily  dextrose 5%. 1000 milliLiter(s) (50 mL/Hr) IV Continuous <Continuous>  dextrose 50% Injectable 12.5 Gram(s) IV Push once  dextrose 50% Injectable 25 Gram(s) IV Push once  dextrose 50% Injectable 25 Gram(s) IV Push once  escitalopram 5 milliGRAM(s) Oral daily  fluconAZOLE IVPB 200 milliGRAM(s) IV Intermittent every 24 hours  folic acid 1 milliGRAM(s) Oral daily  hydrocortisone 10 milliGRAM(s) Oral every 12 hours  insulin glargine Injectable (LANTUS) 7 Unit(s) SubCutaneous at bedtime  insulin regular  human corrective regimen sliding scale   SubCutaneous <User Schedule>  insulin regular  human recombinant 17 Unit(s) SubCutaneous every 6 hours  levETIRAcetam  Solution 500 milliGRAM(s) Oral every 24 hours  metoclopramide 5 milliGRAM(s) Oral every 12 hours  midodrine 10 milliGRAM(s) Oral every 8 hours  modafinil 100 milliGRAM(s) Oral daily  multivitamin 1 Tablet(s) Oral daily  piperacillin/tazobactam IVPB. 2.25 Gram(s) IV Intermittent every 8 hours    MEDICATIONS  (PRN):  dextrose Gel 1 Dose(s) Oral once PRN Blood Glucose LESS THAN 70 milliGRAM(s)/deciliter  glucagon  Injectable 1 milliGRAM(s) IntraMuscular once PRN Glucose LESS THAN 70 milligrams/deciliter      ALLERGIES:  No Known Allergies      LABS:           PT/INR - ( 20 Jun 2018 07:09 )   PT: 29.6 sec;   INR: 2.61          CAPILLARY BLOOD GLUCOSE  POCT Blood Glucose.: 171 mg/dL (19 Jun 2018 23:58)  POCT Blood Glucose.: 162 mg/dL (19 Jun 2018 22:47)  POCT Blood Glucose.: 207 mg/dL (19 Jun 2018 19:03)  POCT Blood Glucose.: 143 mg/dL (19 Jun 2018 14:16)    I&O's Summary    18 Jun 2018 07:01  -  19 Jun 2018 07:00  --------------------------------------------------------  IN: 1888 mL / OUT: 600 mL / NET: 1288 mL    19 Jun 2018 07:01  - 20 Jun 2018 06:44  --------------------------------------------------------  IN: 2826 mL / OUT: 2379 mL / NET: 447 mL               RADIOLOGY & ADDITIONAL TESTS: Reviewed.

## 2018-06-20 NOTE — ADVANCED PRACTICE NURSE CONSULT - RECOMMEDATIONS
Sacral pressure injury- cleanse with wound cleanser, pack lightly with Dakin's 1/4 strength solution, cover with foam dressing x 7 days.  LLE wound - cleanse with normal saline, apply hydrogel, 4x4 gauze, wrap with al every other day.   Trach plate - apply Mepilex Lite dressing beneath trach plate every 5 days or prn if saturated.   Discussed assessment and recommendations with Jessica and Dr Juárez.

## 2018-06-20 NOTE — PROGRESS NOTE ADULT - PROBLEM SELECTOR PLAN 10
VTE: Coumadin dosed nightly, with INR daily checks, goal 2-3  GI PPx: PPI  DNR- Made DNR, family wants escalation of care, pressors if needed.  F: No IVF in setting of HD  E: Replete electrolytes with caution in setting of HD  N: Jevity 1.5 goal rate 68cc/hr  Dispo: family meeting on 6/22 at 10am regarding treatment goals, placement, antibiotic therapy

## 2018-06-20 NOTE — PROGRESS NOTE ADULT - SUBJECTIVE AND OBJECTIVE BOX
Patient seen and examined at bedside.     Chief Complaint: Need for dialysis, abnormal labs, buttock ulcers     Had HD yesterday  Was unable to tolerate much UF due to hemodynamic instability.   [facility]  INterval events reviewed  Unable to provide history     acetaminophen    Suspension. 650 milliGRAM(s) every 8 hours  acetylcysteine 20% Inhalation 4 milliLiter(s) two times a day  aspirin  chewable 81 milliGRAM(s) daily  atorvastatin 80 milliGRAM(s) at bedtime  collagenase Ointment 1 Application(s) daily  dextrose 5%. 1000 milliLiter(s) <Continuous>  dextrose 50% Injectable 12.5 Gram(s) once  dextrose 50% Injectable 25 Gram(s) once  dextrose 50% Injectable 25 Gram(s) once  dextrose Gel 1 Dose(s) once PRN  escitalopram 5 milliGRAM(s) daily  fluconAZOLE IVPB 200 milliGRAM(s) every 24 hours  folic acid 1 milliGRAM(s) daily  glucagon  Injectable 1 milliGRAM(s) once PRN  hydrocortisone 10 milliGRAM(s) every 12 hours  insulin glargine Injectable (LANTUS) 7 Unit(s) at bedtime  insulin regular  human corrective regimen sliding scale   <User Schedule>  insulin regular  human recombinant 17 Unit(s) every 6 hours  levETIRAcetam  Solution 500 milliGRAM(s) every 24 hours  metoclopramide 5 milliGRAM(s) every 12 hours  midodrine 10 milliGRAM(s) every 8 hours  modafinil 100 milliGRAM(s) daily  multivitamin 1 Tablet(s) daily  piperacillin/tazobactam IVPB. 2.25 Gram(s) every 8 hours      Allergies    No Known Allergies    Intolerances        T(C): , Max: 38.1 (06-19-18 @ 21:58)  T(F): , Max: 100.5 (06-19-18 @ 21:58)  HR: 92 (06-20-18 @ 09:28)  BP: 91/60 (06-20-18 @ 09:28)  BP(mean): --  RR: 20 (06-20-18 @ 09:28)  SpO2: 100% (06-20-18 @ 09:28)  Wt(kg): --    06-19 @ 07:01  -  06-20 @ 07:00  --------------------------------------------------------  IN:    Enteral Tube Flush: 100 mL    Jevity: 1426 mL    Other: 1100 mL    Solution: 200 mL  Total IN: 2826 mL    OUT:    Other: 1279 mL    Rectal Tube: 1100 mL  Total OUT: 2379 mL    Total NET: 447 mL              LABS:                        8.5    31.8  )-----------( 312      ( 19 Jun 2018 11:23 )             29.4     06-19    142  |  97  |  86<H>  ----------------------------<  155<H>  6.0<H>   |  24  |  4.54<H>    Ca    9.3      19 Jun 2018 11:23  Phos  6.7     06-19  Mg     2.5     06-19        PT/INR - ( 20 Jun 2018 07:09 )   PT: 29.6 sec;   INR: 2.61                    RADIOLOGY & ADDITIONAL STUDIES:

## 2018-06-20 NOTE — PROGRESS NOTE ADULT - CARDIOVASCULAR
detailed exam
negative
detailed exam
detailed exam
negative
detailed exam
negative
detailed exam
detailed exam
negative
detailed exam
negative
detailed exam
negative
detailed exam
negative
detailed exam

## 2018-06-20 NOTE — PROGRESS NOTE ADULT - [facility] PE MLT D E PC
regular rate and rhythm

## 2018-06-20 NOTE — PROGRESS NOTE ADULT - CARDIOVASCULAR DETAILS
positive S1/positive S2
positive S1/positive S2
positive S2/positive S1
positive S1/positive S2
positive S2/positive S1
positive S1/positive S2
positive S2/positive S1
positive S1/positive S2
positive S2/positive S1
positive S1/positive S2
positive S2/positive S1
positive S1/positive S2
positive S1/positive S2
positive S2/positive S1
positive S1
positive S1/positive S2

## 2018-06-21 DIAGNOSIS — Z71.89 OTHER SPECIFIED COUNSELING: ICD-10-CM

## 2018-06-21 DIAGNOSIS — Z51.5 ENCOUNTER FOR PALLIATIVE CARE: ICD-10-CM

## 2018-06-21 LAB
ALBUMIN SERPL ELPH-MCNC: 1.8 G/DL — LOW (ref 3.3–5)
ALP SERPL-CCNC: 303 U/L — HIGH (ref 40–120)
ALT FLD-CCNC: 87 U/L — HIGH (ref 10–45)
ANION GAP SERPL CALC-SCNC: 22 MMOL/L — HIGH (ref 5–17)
AST SERPL-CCNC: 84 U/L — HIGH (ref 10–40)
BILIRUB SERPL-MCNC: 0.4 MG/DL — SIGNIFICANT CHANGE UP (ref 0.2–1.2)
BUN SERPL-MCNC: 82 MG/DL — HIGH (ref 7–23)
CALCIUM SERPL-MCNC: 9 MG/DL — SIGNIFICANT CHANGE UP (ref 8.4–10.5)
CHLORIDE SERPL-SCNC: 93 MMOL/L — LOW (ref 96–108)
CO2 SERPL-SCNC: 23 MMOL/L — SIGNIFICANT CHANGE UP (ref 22–31)
CREAT SERPL-MCNC: 4.11 MG/DL — HIGH (ref 0.5–1.3)
CULTURE RESULTS: SIGNIFICANT CHANGE UP
CULTURE RESULTS: SIGNIFICANT CHANGE UP
GLUCOSE BLDC GLUCOMTR-MCNC: 163 MG/DL — HIGH (ref 70–99)
GLUCOSE BLDC GLUCOMTR-MCNC: 222 MG/DL — HIGH (ref 70–99)
GLUCOSE BLDC GLUCOMTR-MCNC: 234 MG/DL — HIGH (ref 70–99)
GLUCOSE BLDC GLUCOMTR-MCNC: 242 MG/DL — HIGH (ref 70–99)
GLUCOSE BLDC GLUCOMTR-MCNC: 271 MG/DL — HIGH (ref 70–99)
GLUCOSE SERPL-MCNC: 228 MG/DL — HIGH (ref 70–99)
HCT VFR BLD CALC: 26.5 % — LOW (ref 39–50)
HGB BLD-MCNC: 7.7 G/DL — LOW (ref 13–17)
INR BLD: 2.3 — HIGH (ref 0.88–1.16)
MAGNESIUM SERPL-MCNC: 2.4 MG/DL — SIGNIFICANT CHANGE UP (ref 1.6–2.6)
MCHC RBC-ENTMCNC: 26.2 PG — LOW (ref 27–34)
MCHC RBC-ENTMCNC: 29.1 G/DL — LOW (ref 32–36)
MCV RBC AUTO: 90.1 FL — SIGNIFICANT CHANGE UP (ref 80–100)
PHOSPHATE SERPL-MCNC: 7 MG/DL — HIGH (ref 2.5–4.5)
PLATELET # BLD AUTO: 274 K/UL — SIGNIFICANT CHANGE UP (ref 150–400)
POTASSIUM SERPL-MCNC: 5.7 MMOL/L — HIGH (ref 3.5–5.3)
POTASSIUM SERPL-SCNC: 5.7 MMOL/L — HIGH (ref 3.5–5.3)
PROT SERPL-MCNC: 8.7 G/DL — HIGH (ref 6–8.3)
PROTHROM AB SERPL-ACNC: 26 SEC — HIGH (ref 9.8–12.7)
RBC # BLD: 2.94 M/UL — LOW (ref 4.2–5.8)
RBC # FLD: 19.5 % — HIGH (ref 10.3–16.9)
SODIUM SERPL-SCNC: 138 MMOL/L — SIGNIFICANT CHANGE UP (ref 135–145)
SPECIMEN SOURCE: SIGNIFICANT CHANGE UP
SPECIMEN SOURCE: SIGNIFICANT CHANGE UP
WBC # BLD: 30.7 K/UL — HIGH (ref 3.8–10.5)
WBC # FLD AUTO: 30.7 K/UL — HIGH (ref 3.8–10.5)

## 2018-06-21 PROCEDURE — 90937 HEMODIALYSIS REPEATED EVAL: CPT | Mod: GC

## 2018-06-21 PROCEDURE — 99497 ADVNCD CARE PLAN 30 MIN: CPT | Mod: 25

## 2018-06-21 PROCEDURE — 99233 SBSQ HOSP IP/OBS HIGH 50: CPT | Mod: GC

## 2018-06-21 PROCEDURE — 99233 SBSQ HOSP IP/OBS HIGH 50: CPT

## 2018-06-21 RX ORDER — ERYTHROPOIETIN 10000 [IU]/ML
10000 INJECTION, SOLUTION INTRAVENOUS; SUBCUTANEOUS ONCE
Qty: 0 | Refills: 0 | Status: COMPLETED | OUTPATIENT
Start: 2018-06-21 | End: 2018-06-21

## 2018-06-21 RX ORDER — WARFARIN SODIUM 2.5 MG/1
2 TABLET ORAL ONCE
Qty: 0 | Refills: 0 | Status: DISCONTINUED | OUTPATIENT
Start: 2018-06-21 | End: 2018-06-21

## 2018-06-21 RX ORDER — SODIUM CHLORIDE 9 MG/ML
250 INJECTION INTRAMUSCULAR; INTRAVENOUS; SUBCUTANEOUS ONCE
Qty: 0 | Refills: 0 | Status: COMPLETED | OUTPATIENT
Start: 2018-06-21 | End: 2018-06-21

## 2018-06-21 RX ORDER — FLUCONAZOLE 150 MG/1
200 TABLET ORAL EVERY 24 HOURS
Qty: 0 | Refills: 0 | Status: DISCONTINUED | OUTPATIENT
Start: 2018-06-21 | End: 2018-06-21

## 2018-06-21 RX ADMIN — Medication 4 MILLILITER(S): at 06:35

## 2018-06-21 RX ADMIN — INSULIN GLARGINE 7 UNIT(S): 100 INJECTION, SOLUTION SUBCUTANEOUS at 22:19

## 2018-06-21 RX ADMIN — Medication 1 APPLICATION(S): at 16:49

## 2018-06-21 RX ADMIN — INSULIN HUMAN 17 UNIT(S): 100 INJECTION, SOLUTION SUBCUTANEOUS at 06:39

## 2018-06-21 RX ADMIN — PIPERACILLIN AND TAZOBACTAM 200 GRAM(S): 4; .5 INJECTION, POWDER, LYOPHILIZED, FOR SOLUTION INTRAVENOUS at 06:37

## 2018-06-21 RX ADMIN — MIDODRINE HYDROCHLORIDE 10 MILLIGRAM(S): 2.5 TABLET ORAL at 21:58

## 2018-06-21 RX ADMIN — ESCITALOPRAM OXALATE 5 MILLIGRAM(S): 10 TABLET, FILM COATED ORAL at 16:48

## 2018-06-21 RX ADMIN — INSULIN HUMAN 17 UNIT(S): 100 INJECTION, SOLUTION SUBCUTANEOUS at 00:40

## 2018-06-21 RX ADMIN — Medication 5 MILLIGRAM(S): at 17:25

## 2018-06-21 RX ADMIN — Medication 10 MILLIGRAM(S): at 00:58

## 2018-06-21 RX ADMIN — ERYTHROPOIETIN 10000 UNIT(S): 10000 INJECTION, SOLUTION INTRAVENOUS; SUBCUTANEOUS at 13:14

## 2018-06-21 RX ADMIN — INSULIN HUMAN 2: 100 INJECTION, SOLUTION SUBCUTANEOUS at 06:39

## 2018-06-21 RX ADMIN — MIDODRINE HYDROCHLORIDE 10 MILLIGRAM(S): 2.5 TABLET ORAL at 06:37

## 2018-06-21 RX ADMIN — Medication 10 MILLIGRAM(S): at 17:00

## 2018-06-21 RX ADMIN — Medication 650 MILLIGRAM(S): at 22:58

## 2018-06-21 RX ADMIN — INSULIN HUMAN 3: 100 INJECTION, SOLUTION SUBCUTANEOUS at 17:04

## 2018-06-21 RX ADMIN — Medication 650 MILLIGRAM(S): at 07:38

## 2018-06-21 RX ADMIN — Medication 650 MILLIGRAM(S): at 21:58

## 2018-06-21 RX ADMIN — INSULIN HUMAN 17 UNIT(S): 100 INJECTION, SOLUTION SUBCUTANEOUS at 17:04

## 2018-06-21 RX ADMIN — LEVETIRACETAM 500 MILLIGRAM(S): 250 TABLET, FILM COATED ORAL at 16:43

## 2018-06-21 RX ADMIN — Medication 650 MILLIGRAM(S): at 06:40

## 2018-06-21 RX ADMIN — SODIUM CHLORIDE 250 MILLILITER(S): 9 INJECTION INTRAMUSCULAR; INTRAVENOUS; SUBCUTANEOUS at 22:19

## 2018-06-21 RX ADMIN — Medication 4 MILLILITER(S): at 17:26

## 2018-06-21 RX ADMIN — Medication 5 MILLIGRAM(S): at 06:35

## 2018-06-21 RX ADMIN — Medication 81 MILLIGRAM(S): at 16:49

## 2018-06-21 RX ADMIN — MODAFINIL 100 MILLIGRAM(S): 200 TABLET ORAL at 16:49

## 2018-06-21 NOTE — PROGRESS NOTE ADULT - PROBLEM SELECTOR PLAN 9
use scopalamine patch for respiratory secretions (needs 18 hours for onset of action- so apply Friday)  can consider lorazepam 2mg iv q6h prn for agitation, but I would anticipate that pt will become increasingly lethargic as he goes into renal failure nad accumulates toxins with clearance of same

## 2018-06-21 NOTE — PROGRESS NOTE ADULT - PROBLEM SELECTOR PLAN 10
VTE: Coumadin dosed nightly, with INR daily checks, goal 2-3  GI PPx: PPI  DNR- Made DNR, family wants escalation of care, pressors if needed.  F: No IVF in setting of HD  E: Replete electrolytes with caution in setting of HD  N: Jevity 1.5 goal rate 68cc/hr  Dispo: family meeting on 6/22 at 10am regarding treatment goals, placement, antibiotic therapy VTE: Coumadin dosed nightly, with INR daily checks, goal 2-3  GI PPx: PPI  DNR- Made DNR, family wants escalation of care, pressors if needed.  F: No IVF in setting of HD  E: Replete electrolytes with caution in setting of HD  N: Jevity 1.5 goal rate 68cc/hr  Dispo: family meeting today with decision for no escalation of antibiotics, no MEWs, no escalation of care. Plan to remove wrist splints today, turn off AICD, and perform last HD on Saturday. VTE: decision for no AC    F: No IVF  E: No repletion of lytes, last HD on saturday  N: Jevity 1.5 goal rate 68cc/hr  Dispo: family meeting today with decision for discontinuation of abx therapy, comfort measures, no escalation of antibiotics, no MEWs, no escalation of care. Plan to remove wrist splints today, turn off AICD, and perform last HD on Saturday.

## 2018-06-21 NOTE — PROGRESS NOTE ADULT - PROBLEM SELECTOR PLAN 9
CHF with EF 10-15% 2/2 ischemic cardiomyopathy s/p AICD. Patient with persistent pleural effusions on CXR and US and b/l dependent edema.  - HOLDING ACE/BB in setting of hypotension  - c/w midodrine TID  - c/w HD to maintain fluid balance    #CAD- Hx of MI and ischemic CM s/p AICD, STEMI 7/2017.  - c/w aspirin 81 and Atorvastatin 80mg qhs CHF with EF 10-15% 2/2 ischemic cardiomyopathy s/p AICD. Patient with persistent pleural effusions on CXR and US and b/l dependent edema.  - HOLDING ACE/BB in setting of hypotension  - c/w midodrine TID  - EP to turn off AICD today per family wishes     #CAD- Hx of MI and ischemic CM s/p AICD, STEMI 7/2017.  - discontinue Aspirin and statin to reduce pill burden

## 2018-06-21 NOTE — PROGRESS NOTE ADULT - SUBJECTIVE AND OBJECTIVE BOX
Patient is a 63y Male seen and evaluated at bedside. Patient lying in bed in no acute distress at present remains on ventilatory support. Patient nonverbal at present. Last HD on 6/19.       acetaminophen    Suspension. 650 every 8 hours  acetylcysteine 20% Inhalation 4 two times a day  aspirin  chewable 81 daily  atorvastatin 80 at bedtime  collagenase Ointment 1 daily  dextrose 5%. 1000 <Continuous>  dextrose 50% Injectable 12.5 once  dextrose 50% Injectable 25 once  dextrose 50% Injectable 25 once  dextrose Gel 1 once PRN  epoetin amy Injectable 49918 once  escitalopram 5 daily  fluconAZOLE IVPB 200 every 24 hours  folic acid 1 daily  glucagon  Injectable 1 once PRN  hydrocortisone 10 every 12 hours  insulin glargine Injectable (LANTUS) 7 at bedtime  insulin regular  human corrective regimen sliding scale  <User Schedule>  insulin regular  human recombinant 17 every 6 hours  levETIRAcetam  Solution 500 every 24 hours  metoclopramide 5 every 12 hours  midodrine 10 every 8 hours  modafinil 100 daily  multivitamin 1 daily  piperacillin/tazobactam IVPB. 2.25 every 8 hours  warfarin 2 once      Allergies    No Known Allergies    Intolerances        T(C): , Max: 37.7 (06-21-18 @ 01:04)  T(F): , Max: 99.8 (06-21-18 @ 01:04)  HR: 95 (06-21-18 @ 09:46)  BP: 103/81 (06-21-18 @ 09:46)  BP(mean): --  RR: 22 (06-21-18 @ 09:46)  SpO2: 100% (06-21-18 @ 09:46)  Wt(kg): --    06-20 @ 07:01  -  06-21 @ 07:00  --------------------------------------------------------  IN: 2430 mL / OUT: 0 mL / NET: 2430 mL      Review of Systems:  Limited. Patient off ventilatory support ventilatory support    PHYSICAL EXAM:  GENERAL: Ill appearing, awake but confused, disoriented in no acute distress at present  HEAD:  Atraumatic, Normocephalic,   EYES: Bilateral conjuctival and scleral pallor+nt  Oral cavity: Oral mucosa moist and pale  NECK: Neck supple, tracheostomy present.  CHEST/LUNG:  Bilateral decreased breath sounds, Bibasilar rales and crepitatiosn+nt, Right>left, no wheezing  HEART: Regular rate and rhythm. HOMER II/VI at LPSB, No gallop, no rub   ABDOMEN: Soft, Nontender, nondistended, PEG tube present. BS+nt, No flank tenderness. Sacral decubitus  EXTREMITIES: Bilateral thigh and leg edema+nt, No clubbing, cyanosis  Neurology: AAOx1, drowsy, lethargic and confused, unable to follow verbal commands  SKIN: No rashes or lesions    ACCESS:  Right chest wall tunnel HD catheter present.    LABS:                        7.7    30.7  )-----------( 274      ( 21 Jun 2018 07:30 )             26.5     06-21    138  |  93<L>  |  82<H>  ----------------------------<  228<H>  5.7<H>   |  23  |  4.11<H>    Ca    9.0      21 Jun 2018 07:30  Phos  7.0     06-21  Mg     2.4     06-21    TPro  8.7<H>  /  Alb  1.8<L>  /  TBili  0.4  /  DBili  x   /  AST  84<H>  /  ALT  87<H>  /  AlkPhos  303<H>  06-21      PT/INR - ( 21 Jun 2018 07:30 )   PT: 26.0 sec;   INR: 2.30                    RADIOLOGY & ADDITIONAL STUDIES:  None

## 2018-06-21 NOTE — PROGRESS NOTE ADULT - PROBLEM SELECTOR PLAN 8
AICD to be deactivated today  no MEWS, no escalation of care.  comfort focused care.  consider stopping medications that are not contributing to pts comfort (provigil)

## 2018-06-21 NOTE — PROGRESS NOTE ADULT - PROBLEM SELECTOR PLAN 2
Acute respiratory failure in setting of septic and cardiogenic shock, with trach placed on 4/5/18 for persistent, now chronic respiratory failure.   - Dilaudid 0.25mg IV PRN for tachypnea  - ENT to replace trach before patient is accepted to [facility] Acute respiratory failure in setting of septic and cardiogenic shock, with trach placed on 4/5/18 for persistent, now chronic respiratory failure.   - Dilaudid 0.25mg IV PRN for tachypnea for comfort  - scopolamine PRN secretions

## 2018-06-21 NOTE — PROGRESS NOTE ADULT - PROBLEM SELECTOR PLAN 2
Anemia of chronic renal disease with hgb 7.7  No iron deficiency   EPO during dialysis  Transfuse PRBC per primary team.

## 2018-06-21 NOTE — PROGRESS NOTE ADULT - PROBLEM SELECTOR PLAN 5
Stable. Intermittently following commands. Likely multifactorial in setting of septic and cardiogenic shock as well as brain stem infarct during admission. MRI head (3/26) significant for several old post circulation infarcts and possible new area of brainstem infarct. Per neurology may have had ischemic event from hypoperfusion. Also showing disc protrusion at C3/C4 level coursing superiorly to the C2/C3 contacting the ventral spinal cord.   - c/w Modafinil  - c/w Keppra 500 mg qd with extra 250 mg post HD days for seizures Stable. Likely multifactorial in setting of septic and cardiogenic shock as well as brain stem infarct during admission.   - c/w Modafinil and Keppra

## 2018-06-21 NOTE — PROGRESS NOTE ADULT - PROBLEM SELECTOR PLAN 1
Resolved. Meeting SIRS criteria with leukocytosis and fever on 6/15. S/p 2 doses of vancomycin. No longer febrile, and WBC downtrending. Source likely unsteagable/incurable sacral ulcer with e/o osteo. C difficile was ruled out.   - c/w treatment for sacral decub and UTI as below  - received 2 doses of Vanc to cover for gram + organisms; also on Zosyn, however, in this situation where patient has non-surgical sacral wound and poor prognosis, may be little utility in continuing abx at this time as patient likely to develop resistance  - f/u bcxs NGTD- and fungal cxs pending    #Sacral decubitus ulcer- unstageable s/p debridement with plastic surgery. Cultures growing multiple organisms including Pseudomonas and E faecium, now on Zosyn per sensitivities. Pelvic MRI showing acute osteomyelitis and loculated collection abutting the bladder. Patient is not a surgical candidate based on his comorbidities and inability lay in prone positioning post-op ([id number]). At this time, will not evaluate possible abscess further - will likely recur given poor prognosis.  - c/w Zosyn (6/4-), will d/w family about continuation of abx therapy given that benefit/risk may favor discontinuation to avoid resistance given that ulcer is uncurable   - frequent turning q2h for pressure offloading, air fluid mattress  - wound care with dry dressings with Allevyn over top  - soilage control with rectal tube, benefits outweigh risks at this point   - Nutrition support  - Bcxs NGTD, Fungal Bcxs NGTD    #UTI- Patient with urinary retention requiring chronic indwelling Jose catheter. Urine cultures growing Sherrie Dubliniensis sensitive to Fluconazole.  - C/w Fluconazole 200mg (6/7- 6/21) for 2 week course per ID Resolved. Meeting SIRS criteria with leukocytosis and fever on 6/15. Source likely unsteagable/incurable sacral ulcer with e/o osteo. Was on Vanc and Zosyn, however, in this situation where patient has non-surgical sacral wound and poor prognosis, little utility in continuing abx at this time as patient likely to develop resistance  - antibiotics discontinued per family meeting today  - decision for no escalation of antibiotics, no escalation of care, no MEWS    #Sacral decubitus ulcer- unstageable s/p debridement with plastic surgery. Cultures growing multiple organisms including Pseudomonas and E faecium, now on Zosyn per sensitivities. Pelvic MRI showing acute osteomyelitis and loculated collection abutting the bladder. Patient is not a surgical candidate based on his comorbidities and inability lay in prone positioning post-op ([id number]).   - discontinued abx as above   - pain control   - frequent turning q2h for pressure offloading, air fluid mattress  - wound care with dry dressings with Allevyn over top  - soilage control with rectal tube, benefits outweigh risks at this point   - Nutrition support    #UTI- Patient with urinary retention requiring chronic indwelling Jose catheter. Urine cultures growing Sherrie Dubliniensis sensitive to Fluconazole.  - received last dose of Fluconazole today

## 2018-06-21 NOTE — PROGRESS NOTE ADULT - PROBLEM SELECTOR PLAN 2
Advance care planning meeting  Start time: 1115a  End time: 12 noon  Total time: 45 min    A face to face meeting to discuss advance care planning was held today regarding: CHATA HUNTER  Primary decision maker: HCP Cristal Montana  Alternate/surrogate: none  Discussed advance directives including, but not limited to, healthcare proxy and code status.  Met with pts dgt, Cristal Montana, son in law, Derek, pts ex wife, Terra, pts long time friend Jovani, Dr Brooks, Dr Campbell, Dr Cho, Rani MITCHELL, Gema GUTIERREZ, Ethan Del Rosario NP student,  and this clinican.   Family shared that they understand that pt is not improving despite all effort. They ask question re: removal of life support, including the ventilator, HD, one or both.    Family informed that pts course of ABX will complete today. There is no expectation from the medical team that pt will cure his infection with ABX and he will not be able to heal his wound.   Family is very tearful and identify prevention of further suffering as a short term goal.  Cristal wishes to allow for 1 additional HD session in order to allow family member to come to the hospital to say goodbye and so that she can spend time with her father.    Death is expected in hours to days after stopping HD.  Pt will stop ABX today.   Cristal is not comfortable with removing pt from the ventilator at this time.  Cristal is in agreement with deactivating pts AICD if effort to ensure a peaceful death.   Emotional support provided to pts family.   PTs g son to be seen by Jacy Mcduffie (pt and family support)  Decision regarding code status:  dnr  Documentation completed today: none new

## 2018-06-21 NOTE — PROGRESS NOTE ADULT - PROBLEM SELECTOR PLAN 5
remains vent dependent.  Pt's HCP is not agreeable to removing ventilatory support at this time.  That continue to be an option, if she were to change her mind.

## 2018-06-21 NOTE — PROGRESS NOTE ADULT - PROBLEM SELECTOR PLAN 4
Patient noted to have persistent leukocytosis. Source of infection thought to be due to sacral ulcer leading to osteomyelitis.  Plan per primary/ID team.  Adjust antibiotics as per renal dose clearance.  Advanced directives per family.

## 2018-06-21 NOTE — PROGRESS NOTE ADULT - PROBLEM SELECTOR PLAN 6
Hd dependant.  Last HD session will be Saturday June 23, assuming pts status allow for same, Family aware that pt may be too unstable for same

## 2018-06-21 NOTE — PROGRESS NOTE ADULT - ATTENDING COMMENTS
Family meeting this morning with palliative, ethics, CSW and the patient's HCP. Plan to go forward with a palliative approach. Will turn off defibrillator, cont HD till Sat then stop HD.  Will consider stopping tube feeds tomorrow.   Dilaudid PRN.

## 2018-06-21 NOTE — PROGRESS NOTE ADULT - PROBLEM SELECTOR PLAN 1
pt frequently grimacing and appears to be crying although no tears produced.  multiple potential sites/etologies of pain (tubes, drain, wounds, artifical devices, prolonged immobility, contractures)  Pls restart  hydromorphone 0.25mg iv q4h ATC for pain control in light of "comfort focused" goals as delineated by pts family. do not hold hydromorphone for hypotension.  Can also add additional hydromorphone 0.25mg iv q1h prn for breakthru pain alex at time of bathing, wound care and personal care.

## 2018-06-21 NOTE — PROGRESS NOTE ADULT - PROBLEM SELECTOR PLAN 3
Stage IV with evidence of OM on imaging. No role for surgical debridement   antibiotics insufficient to clear infection, as we are not able to control the source and heal the wound  This is likely to be the cuase of pts death due to the resultant sepsis.

## 2018-06-21 NOTE — PROGRESS NOTE ADULT - PROBLEM SELECTOR PLAN 4
Stable. Persistently hypotensive throughout admission, now on midodrine 15 mg TID and hydrocortisone 10mg BID for concern of adrenal insufficiency. Has intermittently used albumin to tolerate dialysis.   - c/w Midodrine 10 mg q8h and additional 10 midodrine pre-HD  - hydrocortisone 10mg BID   - albumin during HD if necessary to maintain MAP>60 Stable. Persistently hypotensive throughout admission, now on midodrine 15 mg TID and hydrocortisone 10mg BID for concern of adrenal insufficiency. Has intermittently used albumin to tolerate dialysis.   - c/w Midodrine 10 mg q8h and additional 10 midodrine pre-HD  - hydrocortisone 10mg BID

## 2018-06-21 NOTE — PROGRESS NOTE ADULT - PROBLEM SELECTOR PLAN 7
Support provided to patient and family. Patient to have access to supportive services during rest of hospital stay as the patient/family deemed necessary ie. Massage therapy, Music therapy, Patient and family supportive services, etc.

## 2018-06-21 NOTE — PROGRESS NOTE ADULT - SUBJECTIVE AND OBJECTIVE BOX
PGY1 PROGRESS NOTE:    CC: septic Shock due to bilateral cellulitis - course complicated by renal failure (on hemodialysis), stroke, and respiratory failure. (01 Jun 2018 14:51)    OVERNIGHT EVENTS: ELSIE    SUBJECTIVE / INTERVAL HPI: Plan for multidisciplinary family meeting this morning at 10am involving the family, social work, ID, primary care team and palliative team. Patient seen and examined at bedside. Unchanged exam. Able to follow commands by nodding head intermittently and moving extremities.    ROS: negative unless otherwise specified    VITAL SIGNS:  T(F): 99.3 (21 Jun 2018 06:42), Max: 99.8 (21 Jun 2018 01:04)  HR: 96 (21 Jun 2018 06:42) (84 - 104)  BP: 96/68 (21 Jun 2018 06:42) (91/50 - 126/78)  RR: 19 (21 Jun 2018 06:42) (19 - 21)  SpO2: 100% (21 Jun 2018 06:42) (100% - 100%)    PHYSICAL EXAM:  Constitutional: thin, cachectic, chronically ill appearing, tracheostomy on ventilator, NAD    HEENT: temporal wasting, PERRLA, EOMI, dry MM  Neck: No JVD, tracheostomy with minimal mucoid discharge  Respiratory: transmitted upper resp sounds, course breath sounds b/l, rhonchi b/l  Cardiovascular: AICD in place, normal S1S2, no MRG  Gastrointestinal: non-distended, soft, +BS, +PEG tube, +rectal tube with green-brown loose stools  Extremities: hyperpigmented skin on anterior shin RLE, trace dependent edema, hands in braces to prevent contracture  Musculoskeletal: increased rigidity in UE, atrophied musculature in b/l LE  Vascular: 1+ peripheral pulses  Neurological: intermittently able to follow commands, moving all extremities spontaneously, no focal deficits  Skin: Large [id number] unstageable sacral ulcer with exposed necrotic tissue and muscles and tracking underneath in all directions, foul smelling, black eschar overlying tissue, non-bleeding, no purulent material    MEDICATIONS:  MEDICATIONS  (STANDING):  acetaminophen    Suspension. 650 milliGRAM(s) Oral every 8 hours  acetylcysteine 20% Inhalation 4 milliLiter(s) Inhalation two times a day  aspirin  chewable 81 milliGRAM(s) Oral daily  atorvastatin 80 milliGRAM(s) Oral at bedtime  collagenase Ointment 1 Application(s) Topical daily  dextrose 5%. 1000 milliLiter(s) (50 mL/Hr) IV Continuous <Continuous>  dextrose 50% Injectable 12.5 Gram(s) IV Push once  dextrose 50% Injectable 25 Gram(s) IV Push once  dextrose 50% Injectable 25 Gram(s) IV Push once  escitalopram 5 milliGRAM(s) Oral daily  fluconAZOLE IVPB 200 milliGRAM(s) IV Intermittent every 24 hours  folic acid 1 milliGRAM(s) Oral daily  hydrocortisone 10 milliGRAM(s) Oral every 12 hours  insulin glargine Injectable (LANTUS) 7 Unit(s) SubCutaneous at bedtime  insulin regular  human corrective regimen sliding scale   SubCutaneous <User Schedule>  insulin regular  human recombinant 17 Unit(s) SubCutaneous every 6 hours  levETIRAcetam  Solution 500 milliGRAM(s) Oral every 24 hours  metoclopramide 5 milliGRAM(s) Oral every 12 hours  midodrine 10 milliGRAM(s) Oral every 8 hours  modafinil 100 milliGRAM(s) Oral daily  multivitamin 1 Tablet(s) Oral daily  piperacillin/tazobactam IVPB. 2.25 Gram(s) IV Intermittent every 8 hours    MEDICATIONS  (PRN):  dextrose Gel 1 Dose(s) Oral once PRN Blood Glucose LESS THAN 70 milliGRAM(s)/deciliter  glucagon  Injectable 1 milliGRAM(s) IntraMuscular once PRN Glucose LESS THAN 70 milligrams/deciliter      ALLERGIES:  No Known Allergies      LABS:                                 7.7    30.7  )-----------( 274      ( 21 Jun 2018 07:30 )             26.5     06-21    138  |  93<L>  |  82<H>  ----------------------------<  228<H>  5.7<H>   |  23  |  4.11<H>    Ca    9.0      21 Jun 2018 07:30  Phos  7.0     06-21  Mg     2.4     06-21    TPro  8.7<H>  /  Alb  1.8<L>  /  TBili  0.4  /  DBili  x   /  AST  84<H>  /  ALT  87<H>  /  AlkPhos  303<H>  06-21    PT/INR - ( 21 Jun 2018 07:30 )   PT: 26.0 sec;   INR: 2.30       CAPILLARY BLOOD GLUCOSE  POCT Blood Glucose.: 222 mg/dL (21 Jun 2018 06:35)  POCT Blood Glucose.: 295 mg/dL (20 Jun 2018 23:35)  POCT Blood Glucose.: 199 mg/dL (20 Jun 2018 20:23)  POCT Blood Glucose.: 95 mg/dL (20 Jun 2018 17:36)  POCT Blood Glucose.: 128 mg/dL (20 Jun 2018 12:02)    I&O's Summary  20 Jun 2018 07:01  -  21 Jun 2018 07:00  --------------------------------------------------------  IN: 2430 mL / OUT: 0 mL / NET: 2430 mL               RADIOLOGY & ADDITIONAL TESTS: Reviewed.

## 2018-06-21 NOTE — PROGRESS NOTE ADULT - SUBJECTIVE AND OBJECTIVE BOX
FOLLOW UP TO PRELIM ETHICS CONSULT NOTE:    PER PROVIDERS, Mr. Montana is a 62 yo gentleman admitted since March of 2018, initially with cellulitis.  Course complicated by septic shock, cardiogenic shock, renal failure, respiratory failure (requiring vent), multiple infections, CVA with seizure activity, and extreme functional incapacity.  To date, the patient remains vented via trach, provided nutrition via PEG, and receiving hemodialysis.     ETHICS PROCESS:  See prior ethics note for details of preliminary clinical team meeting.  Chart reviewed, with interval events noted.  Patient observed.  Met today with patient's daughter/HCP Cristal Montana, Cristal's  Derek, pt's ex-wife (Cristal's mother) Terra, long-time family friend, Jovani, Medical team (Dr Brooks, Dr. Campbell), Palliative Medicine (Lolis Sorensen, NARDA; Ethan Del Rosario, NARDA - fellow), Rani (STEPHEN), Gema (RN), and this writer (Clinical Ethics).    ETHICS FINDINGS:  The patient is observed to be barely responsive to his name and reportedly in pain, especially in the context of bedside care.  The patient's daughter/HCP, Cristal,  expressed that she and the family have come to realize that the patient is suffering without the possibility of recovery.  Their agenda today was confirm their understanding of the patient's prognosis and to discuss the options for withdrawal of life support in order to prioritize the patient's comfort.  Providers summarized the patient's current status, confirming that despite all of the care and treatment the patient has received, that the nonhealing and chronically infected wound, coupled with his other many comorbidities, render his condition terminal.  After discussion of the options, Cristal chose to stop dialysis (after the scheduled Saturday session - if tolerated), in order to allow for visits from other family/friends, expecting death in what providers predict might be in days to weeks.  Cristal and all other participants agreed to leave the ventilator on, disable the pts' ICD (in accord with the DNR order already in place), and to only provide treatment that is in service of the patient's comfort going forward.  Specifically, no escalation of treatment, focus on pain and symptom management, complete current antibiotic course (ending today). disable ICD and d/c HD after Saturday's session as described.      ETHICS RECOMMENDATIONS:  All present agree that this is an ethically and medically sound plan, providing compassionate care to dying patient and meeting the needs of family.  Cristal noted that her brother has been unreliable and although he was given the opportunity to attend today's discussion, he did not.  Family present indicate that the patient's son is aware of and does not object to the plan to prioritize comfort.  We offered to contact him personally if desired, and were informed that he does not have a phone.  Support was provided to all present with an offer for patient and family support counselor, Jacy Mcduffie, to assist in discussing the patient's condition Cristal's 5 yo son, the patient's grandson.  We met the grandson and he did visit the patient with his parents, but he was not present for the discussion described.    Will follow with you. FOLLOW UP TO PRELIM ETHICS CONSULT NOTE:    PER PROVIDERS, Mr. Montana is a 64 yo gentleman admitted since March of 2018, initially with cellulitis.  Course complicated by septic shock, cardiogenic shock, renal failure, respiratory failure (requiring vent), multiple infections, CVA with seizure activity, and extreme functional incapacity.  To date, the patient remains vented via trach, provided nutrition via PEG, and receiving hemodialysis.     ETHICS PROCESS:  See prior ethics note for details of preliminary clinical team meeting.  Chart reviewed, with interval events noted.  Patient observed.  Met today with patient's daughter/HCP Cristal Montana, Cristal's  Derek, pt's ex-wife (Cristal's mother) Terra, long-time family friend, Jovani, Medical team (Dr Brooks, Dr. Campbell), Palliative Medicine (Lolis Sorensen, NARDA; Ethan Del Rosario, NP - fellow), STEPHEN (Rani), RN (Gema), and this writer (Clinical Ethics).    ETHICS FINDINGS:  The patient is observed to be barely responsive to his name and reportedly in pain, especially in the context of bedside care.  The patient's daughter/HCP, Cristal,  expressed that she and the family have come to realize that the patient is suffering without the possibility of recovery.  Their agenda today was confirm their understanding of the patient's prognosis and to discuss the options for withdrawal of life support in order to prioritize the patient's comfort.  Providers summarized the patient's current status, confirming that despite all of the care and treatment the patient has received, that the nonhealing and chronically infected wound, coupled with his other many comorbidities, render his condition terminal.  After discussion of the options, Cristal chose to stop dialysis (after the scheduled Saturday session - if tolerated), in order to allow for visits from other family/friends, expecting death in what providers predict might be in days to weeks.  Cristal and all other participants agreed to leave the ventilator on, disable the pts' ICD (in accord with the DNR order already in place), and to only provide treatment that is in service of the patient's comfort going forward.  Specifically, no escalation of treatment, focus on pain and symptom management, complete current antibiotic course (ending today). disable ICD and d/c HD after Saturday's session as described.      ETHICS RECOMMENDATIONS:  All present agree that this is an ethically and medically sound plan, providing compassionate care to dying patient and meeting the needs of family.  Cristal noted that her brother has been unreliable and although he was given the opportunity to attend today's discussion, he did not.  Family present indicate that the patient's son is aware of and does not object to the plan to prioritize comfort.  We offered to contact him personally if desired, and were informed that he does not have a phone.  Support was provided to all present with an offer for patient and family support counselor, Jacy Mcduffie, to assist in discussing the patient's condition with Cristal's 7 yo son, the patient's grandson.  We met the grandson and he did visit the patient with his parents, but he was not present for the discussion described.    Will follow with you.

## 2018-06-21 NOTE — PROGRESS NOTE ADULT - PROBLEM SELECTOR PLAN 1
ESRD TTS schedule for now with rigth chest wall tunnel HD catheter.  Volume status stable, K 5.7 at present.  Proceed with HD today as ordered   May not be beneficial for AVF placement given current comorbidities

## 2018-06-21 NOTE — PROGRESS NOTE ADULT - SUBJECTIVE AND OBJECTIVE BOX
Palliative Medicine  reconsulted on this 63 year old male who has been hospitalized over 3 months.  Pt was admitted with cellulitis in 2018.  His course was complicated by septic shock, cardiogenic shock, renal failure, respiratory failure, multiple infections and cva with seizure activity.  Pt remains mechanically vented, hemodialysis dependant, completely disabled requiring complete care for ADLs.  He is fed enterally through a PEG tube. Pt has developed significant decubitus ulcer which is concerning for osteomyelitis (work up in progress). As pt is fecally incontinent, he continues to contaminate the wound with fecal matter.    Interval history:  stable. no changes  remains with leukocytosis. Stable unstageable large sacral wound with tunnelling and foul odor.   Antibiotics on board although fluconazole is due to be completed today  Pt continue to appears to be in pain, often and with minimal stimulation (for example applying his hand splints)    PAIN (0-10):  yes  Pt cannot enumerate but grimaces often  DYSPNEA (Y/N):  no  NAUSEA/VOMITING (Y/N):  no  SECRETIONS (Y/N):  no  AGITATION (Y/N): yes mildly    OTHER REVIEW OF SYSTEMS:  UNABLE TO OBTAIN  due to:  altered mental status    Vital Signs Last 24 Hrs  T(C): 36.9 (2018 10:55), Max: 37.7 (2018 01:04)  T(F): 98.5 (2018 10:55), Max: 99.8 (2018 01:04)  HR: 88 (2018 12:10) (84 - 104)  BP: 108/72 (2018 10:55) (91/50 - 126/78)  BP(mean): --  RR: 23 (2018 10:55) (19 - 23)  SpO2: 98% (2018 12:10) (98% - 100%)    Wt(kg): --    Daily     Daily Weight in k (2018 05:06)  (pt weight in 2018 is documented at 74 kgs - altho pt was fluid overloaded)    GENERAL:  often grimacing, chronically ill appearing. thin and frail   HEENT:  NC/AT, normocephalic, sclera anicteric, temporal wasting, attempting to open eyes to command  NECK:    supple, no JVD, + tracheostomy, minimal mucoid drainage noted to ora trach site  CVS:     reg, + left chest wall PPM, R sided perm cath  RESP:  vented, course, sonorous breath sounds  GI:    + PEG tube, flat, nontender, +BS,   :    aneuric, on HD, no barnes  Rectal:  rectal tube in place, leaking liquid stool  MUSC:   no movement to Bilat LE, moving bilat UE against gravity, hand splints in place  NEURO:   lethargic   PSYCH:    maritza       SKIN:    + sacral decub. large, deep and tunnelling present, no bleeding at site, the wound bed appears dark gray- not vascularized, specific observations per wound care nurse, + foul odor  LYMPH:      neg  	                   OPIATE NAÏVE (Y/N):  yes  ALLERGIES: No Known Allergies      MEDICATIONS  (STANDING):  acetaminophen    Suspension. 650 milliGRAM(s) Oral every 8 hours  acetylcysteine 20% Inhalation 4 milliLiter(s) Inhalation two times a day  aspirin  chewable 81 milliGRAM(s) Oral daily  collagenase Ointment 1 Application(s) Topical daily  escitalopram 5 milliGRAM(s) Oral daily  hydrocortisone 10 milliGRAM(s) Oral every 12 hours  insulin glargine Injectable (LANTUS) 7 Unit(s) SubCutaneous at bedtime  insulin regular  human corrective regimen sliding scale   SubCutaneous <User Schedule>  insulin regular  human recombinant 17 Unit(s) SubCutaneous every 6 hours  levETIRAcetam  Solution 500 milliGRAM(s) Oral every 24 hours  metoclopramide 5 milliGRAM(s) Oral every 12 hours  midodrine 10 milliGRAM(s) Oral every 8 hours  modafinil 100 milliGRAM(s) Oral daily    MEDICATIONS  (PRN):      	                   LABS REVIEWED                                      7.7    30.7  )-----------( 274      ( 2018 07:30 )             26.5     06-21    138  |  93<L>  |  82<H>  ----------------------------<  228<H>  5.7<H>   |  23  |  4.11<H>    Ca    9.0      2018 07:30  Phos  7.0     06-  Mg     2.4     06-    TPro  8.7<H>  /  Alb  1.8<L>  /  TBili  0.4  /  DBili  x   /  AST  84<H>  /  ALT  87<H>  /  AlkPhos  303<H>  06-                      IMAGING REVIEWED:  n/a  	  ADVANCED DIRECTIVES:    DNR   (AICD not deactivated)    Decision Maker:  pts HCP is Cristal Montana   [phone]    PSYCHOSOCIAL-SPIRITUAL ASSESSMENT:  pts long time friend and  present today       Care plan unchanged          GOALS OF CARE DISCUSSION:       Palliative care info/counseling provided	           Family meeting-- today-- see below for details       Advanced Directives; DNR as of May 2018      	        REFERRALS:        Palliative Med        Unit SW/Case Mgmt              Patient/Family Support-- for support of pts g sonOmar age 7 yo       Ethics

## 2018-06-21 NOTE — PROGRESS NOTE ADULT - PROBLEM SELECTOR PLAN 7
Hx of AF and LV thrombus on Coumadin prior to admission. Most recent TTE with definity shows no LV thrombus currently.   - Not currently on rate control as HR has remained   - Lopressor pushes for HR goal <110  - dose Coumadin daily based on INR    #LV thrombus- LV thrombus noted on RUKHSANA on 4/10. Remains therapeutic on warfarin. Hx of AF and LV thrombus on Coumadin prior to admission.   - HR remained stable  - Lopressor pushes for HR goal <110 for comfort    #LV thrombus- LV thrombus noted on RUKHSANA on 4/10. No more AC per family.

## 2018-06-21 NOTE — PROGRESS NOTE ADULT - PROBLEM SELECTOR PLAN 6
ARF likely 2/2 to ischemic ATN with hypoperfusion in setting of shock this admission. Now on HD through R sided Permacath.   - not candidate for AVF placement   - c/w intermittent HD  - avoid nephrotoxic agents  - renal following ARF likely 2/2 to ischemic ATN with hypoperfusion in setting of shock this admission. Now on HD through R sided Permacath.   - last HD this Saturday per family

## 2018-06-21 NOTE — PROGRESS NOTE ADULT - PROBLEM SELECTOR PLAN 8
Stable. Requiring transfusions intermittently. Likely 2/2 CKD. Iron studies consistent with anemia of chronic disease.   - Stable H/H this AM  - Keep active T&S  - Monitor H/H, transfuse with Hgb goal >7 Stable. Requiring transfusions intermittently. Likely 2/2 CKD. Iron studies consistent with anemia of chronic disease.   - Stable H/H this AM  - transfuse only for symptomatic relief

## 2018-06-21 NOTE — PROGRESS NOTE ADULT - SUBJECTIVE AND OBJECTIVE BOX
Patient was seen and evaluated on dialysis.   Patient is tolerating the procedure well.   HR: 88 (06-21-18 @ 12:10)  BP: 108/72 (06-21-18 @ 10:55)  Continue dialysis:   Dialyzer: Revaclear 400          QB:400        QD: 500 2K+  Goal UF 1 to 1.5 kg over 3.5 Hours

## 2018-06-22 LAB
GLUCOSE BLDC GLUCOMTR-MCNC: 106 MG/DL — HIGH (ref 70–99)
GLUCOSE BLDC GLUCOMTR-MCNC: 140 MG/DL — HIGH (ref 70–99)
GLUCOSE BLDC GLUCOMTR-MCNC: 179 MG/DL — HIGH (ref 70–99)
GLUCOSE BLDC GLUCOMTR-MCNC: 87 MG/DL — SIGNIFICANT CHANGE UP (ref 70–99)
INR BLD: 1.68 — HIGH (ref 0.88–1.16)
PROTHROM AB SERPL-ACNC: 18.8 SEC — HIGH (ref 9.8–12.7)

## 2018-06-22 PROCEDURE — 99232 SBSQ HOSP IP/OBS MODERATE 35: CPT | Mod: GC

## 2018-06-22 PROCEDURE — 99233 SBSQ HOSP IP/OBS HIGH 50: CPT | Mod: GC

## 2018-06-22 RX ORDER — SCOPALAMINE 1 MG/3D
1 PATCH, EXTENDED RELEASE TRANSDERMAL
Qty: 0 | Refills: 0 | Status: DISCONTINUED | OUTPATIENT
Start: 2018-06-22 | End: 2018-07-01

## 2018-06-22 RX ORDER — HYDROMORPHONE HYDROCHLORIDE 2 MG/ML
0.25 INJECTION INTRAMUSCULAR; INTRAVENOUS; SUBCUTANEOUS EVERY 4 HOURS
Qty: 0 | Refills: 0 | Status: DISCONTINUED | OUTPATIENT
Start: 2018-06-22 | End: 2018-06-27

## 2018-06-22 RX ORDER — HYDROMORPHONE HYDROCHLORIDE 2 MG/ML
0.25 INJECTION INTRAMUSCULAR; INTRAVENOUS; SUBCUTANEOUS
Qty: 0 | Refills: 0 | Status: DISCONTINUED | OUTPATIENT
Start: 2018-06-22 | End: 2018-06-28

## 2018-06-22 RX ADMIN — Medication 10 MILLIGRAM(S): at 04:02

## 2018-06-22 RX ADMIN — Medication 4 MILLILITER(S): at 06:57

## 2018-06-22 RX ADMIN — HYDROMORPHONE HYDROCHLORIDE 0.25 MILLIGRAM(S): 2 INJECTION INTRAMUSCULAR; INTRAVENOUS; SUBCUTANEOUS at 12:01

## 2018-06-22 RX ADMIN — Medication 4 MILLILITER(S): at 18:08

## 2018-06-22 RX ADMIN — MODAFINIL 100 MILLIGRAM(S): 200 TABLET ORAL at 12:02

## 2018-06-22 RX ADMIN — Medication 5 MILLIGRAM(S): at 18:09

## 2018-06-22 RX ADMIN — Medication 10 MILLIGRAM(S): at 18:08

## 2018-06-22 RX ADMIN — HYDROMORPHONE HYDROCHLORIDE 0.25 MILLIGRAM(S): 2 INJECTION INTRAMUSCULAR; INTRAVENOUS; SUBCUTANEOUS at 12:31

## 2018-06-22 RX ADMIN — Medication 5 MILLIGRAM(S): at 06:57

## 2018-06-22 RX ADMIN — HYDROMORPHONE HYDROCHLORIDE 0.25 MILLIGRAM(S): 2 INJECTION INTRAMUSCULAR; INTRAVENOUS; SUBCUTANEOUS at 20:20

## 2018-06-22 RX ADMIN — MIDODRINE HYDROCHLORIDE 10 MILLIGRAM(S): 2.5 TABLET ORAL at 06:57

## 2018-06-22 RX ADMIN — INSULIN HUMAN 17 UNIT(S): 100 INJECTION, SOLUTION SUBCUTANEOUS at 00:04

## 2018-06-22 RX ADMIN — Medication 650 MILLIGRAM(S): at 06:59

## 2018-06-22 RX ADMIN — Medication 81 MILLIGRAM(S): at 12:02

## 2018-06-22 RX ADMIN — INSULIN HUMAN 1: 100 INJECTION, SOLUTION SUBCUTANEOUS at 06:57

## 2018-06-22 RX ADMIN — LEVETIRACETAM 500 MILLIGRAM(S): 250 TABLET, FILM COATED ORAL at 14:07

## 2018-06-22 RX ADMIN — SCOPALAMINE 1 PATCH: 1 PATCH, EXTENDED RELEASE TRANSDERMAL at 12:02

## 2018-06-22 RX ADMIN — INSULIN HUMAN 17 UNIT(S): 100 INJECTION, SOLUTION SUBCUTANEOUS at 12:02

## 2018-06-22 RX ADMIN — HYDROMORPHONE HYDROCHLORIDE 0.25 MILLIGRAM(S): 2 INJECTION INTRAMUSCULAR; INTRAVENOUS; SUBCUTANEOUS at 18:10

## 2018-06-22 RX ADMIN — Medication 650 MILLIGRAM(S): at 15:07

## 2018-06-22 RX ADMIN — Medication 650 MILLIGRAM(S): at 07:48

## 2018-06-22 RX ADMIN — ESCITALOPRAM OXALATE 5 MILLIGRAM(S): 10 TABLET, FILM COATED ORAL at 12:01

## 2018-06-22 RX ADMIN — Medication 650 MILLIGRAM(S): at 14:07

## 2018-06-22 RX ADMIN — INSULIN HUMAN 2: 100 INJECTION, SOLUTION SUBCUTANEOUS at 00:04

## 2018-06-22 RX ADMIN — MIDODRINE HYDROCHLORIDE 10 MILLIGRAM(S): 2.5 TABLET ORAL at 14:07

## 2018-06-22 RX ADMIN — INSULIN HUMAN 17 UNIT(S): 100 INJECTION, SOLUTION SUBCUTANEOUS at 06:57

## 2018-06-22 RX ADMIN — Medication 1 APPLICATION(S): at 12:04

## 2018-06-22 RX ADMIN — INSULIN HUMAN 17 UNIT(S): 100 INJECTION, SOLUTION SUBCUTANEOUS at 18:14

## 2018-06-22 NOTE — PROGRESS NOTE ADULT - PROBLEM SELECTOR PLAN 10
VTE: decision for no AC    F: No IVF  E: No repletion of lytes, last HD on saturday  N: Jevity 1.5 goal rate 68cc/hr  Dispo: family meeting today with decision for discontinuation of abx therapy, comfort measures, no escalation of antibiotics, no MEWs, no escalation of care. Plan to remove wrist splints today, turn off AICD, and perform last HD on Saturday. VTE: decision for no AC    F: No IVF  E: No repletion of lytes, last HD on Saturday  N: Jevity 1.5 goal rate 68cc/hr  Dispo: family meeting today with decision for discontinuation of abx therapy, comfort measures, no escalation of antibiotics, no MEWs, no escalation of care. Plan to remove wrist splints today, turn off AICD, and perform last HD on Saturday.  *On Hydromorphone 0.25mg IV q4hrs ATC and Hydromorphone 0.25mg IV q1hr PRN breakthrough

## 2018-06-22 NOTE — PROGRESS NOTE ADULT - SUBJECTIVE AND OBJECTIVE BOX
EPS     Called by primary team to have ICD tachy therapy turned off as pt is now for palliative care. DNR status.     Tachy therapy is now OFF.  He is not pacing dependent.  Pacing mode at VVI 40 bpm.

## 2018-06-22 NOTE — PROGRESS NOTE ADULT - EXTREMITIES
detailed exam

## 2018-06-22 NOTE — PROGRESS NOTE ADULT - PROBLEM SELECTOR PLAN 8
Stable. Requiring transfusions intermittently. Likely 2/2 CKD. Iron studies consistent with anemia of chronic disease.   - Stable H/H this AM  - transfuse only for symptomatic relief Stable. Likely 2/2 CKD. Iron studies consistent with anemia of chronic disease.   - no transfusions per family

## 2018-06-22 NOTE — PROGRESS NOTE ADULT - PROBLEM SELECTOR PLAN 7
Hx of AF and LV thrombus on Coumadin prior to admission.   - HR remained stable  - Lopressor pushes for HR goal <110 for comfort    #LV thrombus- LV thrombus noted on RUKHSANA on 4/10. No more AC per family. Hx of AF and LV thrombus on Coumadin prior to admission.   - can consider Lopressor pushes for comfort if HR >120    #LV thrombus- LV thrombus noted on RUKHSANA on 4/10. No more AC per family.

## 2018-06-22 NOTE — PROGRESS NOTE ADULT - PROBLEM SELECTOR PLAN 3
Low K/Low phos/renal feeding  Defer Hecterol on next HD given his overall prognosis and plans of care

## 2018-06-22 NOTE — PROGRESS NOTE ADULT - PROBLEM SELECTOR PLAN 6
ARF likely 2/2 to ischemic ATN with hypoperfusion in setting of shock this admission. Now on HD through R sided Permacath.   - last HD this Saturday per family

## 2018-06-22 NOTE — PROGRESS NOTE ADULT - CONSTITUTIONAL
detailed exam

## 2018-06-22 NOTE — PROGRESS NOTE ADULT - PROBLEM SELECTOR PLAN 2
Acute respiratory failure in setting of septic and cardiogenic shock, with trach placed on 4/5/18 for persistent, now chronic respiratory failure.   - Dilaudid 0.25mg IV PRN for tachypnea for comfort  - scopolamine PRN secretions

## 2018-06-22 NOTE — PROGRESS NOTE ADULT - ASSESSMENT
Patient is a 63 year old male with a history of HFrEF 10-15% due to ischemic cardiomyopathy, s/p AICD, ?atrial fibrillation, hypertension, diabetes mellitus type 2, gout, and CKD for whom nephrology was called for GILMER from ATN requiring hemodialysis.   Currently dialysis dependent without recovery of renal function. Likely ESRD now as it has been over 90 days.   Advanced care planning underway for the patient.

## 2018-06-22 NOTE — PROGRESS NOTE ADULT - PROBLEM SELECTOR PLAN 5
Stable. Likely multifactorial in setting of septic and cardiogenic shock as well as brain stem infarct during admission.   - c/w Modafinil and Keppra Stable. Likely multifactorial in setting of septic and cardiogenic shock as well as brain stem infarct during admission.   - c/w Keppra  - will c/w Modafinil for now, but consider dc'ing if no utility and patient heavily sedated with hydromorphone

## 2018-06-22 NOTE — PROGRESS NOTE ADULT - MS EXT PE MLT D E PC
trace edema b/l
pedal edema
no pedal edema
edema b/l up to thighs
b/l edema up to thighs
no pedal edema
normal/ankle edema, right femoral temporary catheter
b/l edema up to thighs
no pedal edema
no pedal edema/normal
normal/ankle edema
normal/no pedal edema
normal/pedal edema
normal/trace edema
pedal edema
pedal edema
pedal edema/normal
edema b/l
no pedal edema/normal
no pedal edema
no pedal edema/normal
normal/no pedal edema
pedal edema/normal
no pedal edema
normal/trace edema
edema b/l
no pedal edema
pedal edema
b/l lower extremity edema
no pedal edema
normal/no pedal edema
no pedal edema
pedal edema

## 2018-06-22 NOTE — PROGRESS NOTE ADULT - PROBLEM SELECTOR PLAN 1
Resolved. Meeting SIRS criteria with leukocytosis and fever on 6/15. Source likely unsteagable/incurable sacral ulcer with e/o osteo. Was on Vanc and Zosyn, however, in this situation where patient has non-surgical sacral wound and poor prognosis, little utility in continuing abx at this time as patient likely to develop resistance  - antibiotics discontinued per family meeting  - decision for no escalation of antibiotics, no escalation of care, no MEWS    #Sacral decubitus ulcer- unstageable s/p debridement with plastic surgery. Cultures growing multiple organisms including Pseudomonas and E faecium, now on Zosyn per sensitivities. Pelvic MRI showing acute osteomyelitis and loculated collection abutting the bladder. Patient is not a surgical candidate based on his comorbidities and inability lay in prone positioning post-op ([id number]).   - discontinued abx as above   - pain control   - frequent turning q2h for pressure offloading, air fluid mattress  - wound care with dry dressings with Allevyn over top  - soilage control with rectal tube, benefits outweigh risks at this point   - Nutrition support    #UTI- Patient with urinary retention requiring chronic indwelling Jose catheter. Urine cultures growing Sherrie Dubliniensis sensitive to Fluconazole.  - received last dose of Fluconazole today Resolved. Meeting SIRS criteria with leukocytosis and fever on 6/15. Source likely unsteagable/incurable sacral ulcer with e/o osteo. Was on Vanc and Zosyn, however, in this situation where patient has non-surgical sacral wound and poor prognosis, little utility in continuing abx at this time as patient likely to develop resistance  - antibiotics discontinued as patient has a chronic incurable infection. Discussed at family meeting.   - decision for no escalation of antibiotics, no escalation of care, no MEWS    #Sacral decubitus ulcer- unstageable s/p debridement with plastic surgery. Cultures growing multiple organisms including Pseudomonas and E faecium, was on Zosyn per sensitivities. Pelvic MRI showing acute osteomyelitis and loculated collection abutting the bladder. Patient is not a surgical candidate based on his comorbidities and inability lay in prone positioning post-op ([id number]).   - discontinued abx as above   - pain control   - frequent turning q2h for pressure offloading, air fluid mattress  - wound care with dry dressings with Allevyn over top  - soilage control with rectal tube, benefits outweigh risks at this point   - Nutrition support    #UTI- Patient with urinary retention requiring chronic indwelling Jose catheter. Urine cultures growing Sherrie Dubliniensis sensitive to Fluconazole.  - received last dose of Fluconazole

## 2018-06-22 NOTE — PROGRESS NOTE ADULT - RS GEN PE MLT RESP DETAILS PC
respirations non-labored/clear to auscultation bilaterally/good air movement/s/p tracheostomy
s/p tracheostomy, decreased breath sounds at bases R>L
Decreased breath sounds b/l R>L, b/l rhonchi, s/p tracheostomy
respirations non-labored/good air movement/s/p tracheostomy, decreased breath sounds at bases
s/p tracheostomy/clear to auscultation bilaterally/breath sounds equal/good air movement
breath sounds equal/clear to auscultation bilaterally/respirations non-labored/good air movement/normal/airway patent
breath sounds equal/clear to auscultation bilaterally/respirations non-labored/normal/good air movement/airway patent
clear to auscultation bilaterally/respirations non-labored/s/p tracheostomy
good air movement/B/L rales, s/p tracheostomy
good air movement/breath sounds equal/decreased breath sounds at bases, s/p tracheostomy
good air movement/breath sounds equal/s/p tracheostomy/clear to auscultation bilaterally
good air movement/normal/breath sounds equal/s/p tracheostomy, decreased breath sounds at bases
good air movement/normal/s/p tracheostomy, B/L rhonchi/breath sounds equal
good air movement/normal/s/p tracheostomy/clear to auscultation bilaterally/breath sounds equal
good air movement/respirations non-labored/decreased breath sounds b/l bases R>L, s/p tracheostomy
good air movement/respirations non-labored/s/p tracheostomy, decreased breath sounds at bases
respirations non-labored/decreased breath sounds at bases, s/p tracheostomy/breath sounds equal
respirations non-labored/decreased breath sounds b/l R>L, s/p tracheostomy/good air movement
respirations non-labored/good air movement/normal/intubated, decreased breath sounds at bases R > L
respirations non-labored/s/p tracheostomy/breath sounds equal/good air movement/clear to auscultation bilaterally
s/p tracheostomy, decreased breath sounds at bases R>L
breath sounds equal/clear to auscultation bilaterally/intubated/good air movement/normal
respirations non-labored/s/p tracheostomy, decreased breath sound at bases R > L/breath sounds equal/normal
Decreased breath sounds R>L, s/p tracheostomy/good air movement
airway patent/diminished breath sounds, L/no rhonchi/no wheezes/diminished breath sounds, R/normal/no rales
airway patent/normal/b/l rales
clear to auscultation bilaterally/s/p tracheostomy/normal
good air movement/airway patent/B/L rales/breath sounds equal/normal
no rales/no wheezes/no rhonchi/normal/respirations non-labored
no rhonchi/no rales/no wheezes/diminished breath sounds, R/normal/airway patent
normal/no rales/no rhonchi/airway patent/no wheezes
normal/no rhonchi/respirations non-labored/no rales/airway patent
s/p tracheostomy, b/l rhonchi
airway patent/no wheezes/diminished breath sounds, L/no rhonchi/diminished breath sounds, R
s/p tracheostomy/breath sounds equal/good air movement
s/p tracheostomy, b/l rhonchi/good air movement/breath sounds equal/normal
breath sounds equal/s/p tracheostomy, decreased breath sound at bases R>L/good air movement
s/p tracheostomy, decreased breath sounds at bases R>L
s/p tracheostomy, decreased breath sounds at bases R>L
s/p tracheostomy, decreased breath sounds bases R>L/good air movement
decreased breath sounds at the bases R>L/good air movement/respirations non-labored
no rhonchi/no wheezes/airway patent/diminished breath sounds, R/respirations non-labored/normal/no rales/diminished breath sounds, L
normal/good air movement/clear to auscultation bilaterally/s/p tracheostomy
breath sounds equal/clear to auscultation bilaterally/normal/s/p tracheostomy/good air movement
no rales/normal/airway patent/no rhonchi/no wheezes

## 2018-06-22 NOTE — PROGRESS NOTE ADULT - SUBJECTIVE AND OBJECTIVE BOX
PGY1 PROGRESS NOTE:    CC: septic Shock due to bilateral cellulitis - course complicated by renal failure (on hemodialysis), stroke, and respiratory failure. (01 Jun 2018 14:51)    OVERNIGHT EVENTS: ELSIE    SUBJECTIVE / INTERVAL HPI: Multidisciplinary family meeting held yesterday. Patient seen and examined at bedside. Unchanged exam. Able to follow commands by nodding head intermittently and moving extremities.    ROS: negative unless otherwise specified    VITAL SIGNS:  T(F): 98.9 (21 Jun 2018 23:24), Max: 100.2 (21 Jun 2018 21:52)  HR: 85 (21 Jun 2018 23:24) (85 - 105)  BP: 95/64 (21 Jun 2018 23:24) (87/57 - 108/72)  RR: 21 (21 Jun 2018 23:24) (19 - 25)  SpO2: 98% (21 Jun 2018 23:24) (98% - 100%)    PHYSICAL EXAM:  Constitutional: thin, cachectic, chronically ill appearing, tracheostomy on ventilator, NAD    HEENT: temporal wasting, PERRLA, EOMI, dry MM  Neck: No JVD, tracheostomy with minimal mucoid discharge  Respiratory: transmitted upper resp sounds, course breath sounds b/l, rhonchi b/l  Cardiovascular: AICD in place, normal S1S2, no MRG  Gastrointestinal: non-distended, soft, +BS, +PEG tube, +rectal tube with green-brown loose stools  Extremities: hyperpigmented skin on anterior shin RLE, trace dependent edema, hands in braces to prevent contracture  Musculoskeletal: increased rigidity in UE, atrophied musculature in b/l LE  Vascular: 1+ peripheral pulses  Neurological: intermittently able to follow commands, moving all extremities spontaneously, no focal deficits  Skin: Large [id number] unstageable sacral ulcer with exposed necrotic tissue and muscles and tracking underneath in all directions, foul smelling, black eschar overlying tissue, non-bleeding, no purulent material    MEDICATIONS:  MEDICATIONS  (STANDING):  acetaminophen    Suspension. 650 milliGRAM(s) Oral every 8 hours  acetylcysteine 20% Inhalation 4 milliLiter(s) Inhalation two times a day  aspirin  chewable 81 milliGRAM(s) Oral daily  atorvastatin 80 milliGRAM(s) Oral at bedtime  collagenase Ointment 1 Application(s) Topical daily  dextrose 5%. 1000 milliLiter(s) (50 mL/Hr) IV Continuous <Continuous>  dextrose 50% Injectable 12.5 Gram(s) IV Push once  dextrose 50% Injectable 25 Gram(s) IV Push once  dextrose 50% Injectable 25 Gram(s) IV Push once  escitalopram 5 milliGRAM(s) Oral daily  fluconAZOLE IVPB 200 milliGRAM(s) IV Intermittent every 24 hours  folic acid 1 milliGRAM(s) Oral daily  hydrocortisone 10 milliGRAM(s) Oral every 12 hours  insulin glargine Injectable (LANTUS) 7 Unit(s) SubCutaneous at bedtime  insulin regular  human corrective regimen sliding scale   SubCutaneous <User Schedule>  insulin regular  human recombinant 17 Unit(s) SubCutaneous every 6 hours  levETIRAcetam  Solution 500 milliGRAM(s) Oral every 24 hours  metoclopramide 5 milliGRAM(s) Oral every 12 hours  midodrine 10 milliGRAM(s) Oral every 8 hours  modafinil 100 milliGRAM(s) Oral daily  multivitamin 1 Tablet(s) Oral daily  piperacillin/tazobactam IVPB. 2.25 Gram(s) IV Intermittent every 8 hours    MEDICATIONS  (PRN):  dextrose Gel 1 Dose(s) Oral once PRN Blood Glucose LESS THAN 70 milliGRAM(s)/deciliter  glucagon  Injectable 1 milliGRAM(s) IntraMuscular once PRN Glucose LESS THAN 70 milligrams/deciliter      ALLERGIES:  No Known Allergies      LABS:                   RADIOLOGY & ADDITIONAL TESTS: Reviewed. PGY1 PROGRESS NOTE:    CC: septic Shock due to bilateral cellulitis - course complicated by renal failure (on hemodialysis), stroke, and respiratory failure. (01 Jun 2018 14:51)    OVERNIGHT EVENTS: ELSIE    SUBJECTIVE / INTERVAL HPI: Multidisciplinary family meeting held yesterday. EP to come and turn off AICD today. Started on ATC Dilaudid for pain/comfort measures. Patient seen and examined at bedside. Unchanged exam. Able to follow commands by nodding head intermittently and moving extremities.    ROS: negative unless otherwise specified    VITAL SIGNS:  T(F): 98.9 (21 Jun 2018 23:24), Max: 100.2 (21 Jun 2018 21:52)  HR: 85 (21 Jun 2018 23:24) (85 - 105)  BP: 95/64 (21 Jun 2018 23:24) (87/57 - 108/72)  RR: 21 (21 Jun 2018 23:24) (19 - 25)  SpO2: 98% (21 Jun 2018 23:24) (98% - 100%)    PHYSICAL EXAM:  Constitutional: thin, cachectic, chronically ill appearing, tracheostomy on ventilator, NAD, perspiration throughout, odorous  HEENT: temporal wasting, PERRLA, EOMI, dry MM  Neck: No JVD, tracheostomy with minimal mucoid discharge  Respiratory: transmitted upper resp sounds, course breath sounds b/l, rhonchi b/l  Cardiovascular: AICD in place, normal S1S2, no MRG  Gastrointestinal: non-distended, soft, +BS, +PEG tube, +rectal tube with green-brown loose stools  Extremities: hyperpigmented skin on anterior shin RLE, trace dependent edema, hands in braces to prevent contracture  Musculoskeletal: increased rigidity in UE, atrophied musculature in b/l LE  Vascular: 1+ peripheral pulses  Neurological: intermittently able to follow commands, moving all extremities spontaneously, no focal deficits  Skin: Large [id number] unstageable sacral ulcer with exposed necrotic tissue and muscles and tracking underneath in all directions, foul smelling, black eschar overlying tissue, non-bleeding, no purulent material    MEDICATIONS:  MEDICATIONS  (STANDING):  acetaminophen    Suspension. 650 milliGRAM(s) Oral every 8 hours  acetylcysteine 20% Inhalation 4 milliLiter(s) Inhalation two times a day  aspirin  chewable 81 milliGRAM(s) Oral daily  atorvastatin 80 milliGRAM(s) Oral at bedtime  collagenase Ointment 1 Application(s) Topical daily  dextrose 5%. 1000 milliLiter(s) (50 mL/Hr) IV Continuous <Continuous>  dextrose 50% Injectable 12.5 Gram(s) IV Push once  dextrose 50% Injectable 25 Gram(s) IV Push once  dextrose 50% Injectable 25 Gram(s) IV Push once  escitalopram 5 milliGRAM(s) Oral daily  fluconAZOLE IVPB 200 milliGRAM(s) IV Intermittent every 24 hours  folic acid 1 milliGRAM(s) Oral daily  hydrocortisone 10 milliGRAM(s) Oral every 12 hours  insulin glargine Injectable (LANTUS) 7 Unit(s) SubCutaneous at bedtime  insulin regular  human corrective regimen sliding scale   SubCutaneous <User Schedule>  insulin regular  human recombinant 17 Unit(s) SubCutaneous every 6 hours  levETIRAcetam  Solution 500 milliGRAM(s) Oral every 24 hours  metoclopramide 5 milliGRAM(s) Oral every 12 hours  midodrine 10 milliGRAM(s) Oral every 8 hours  modafinil 100 milliGRAM(s) Oral daily  multivitamin 1 Tablet(s) Oral daily  piperacillin/tazobactam IVPB. 2.25 Gram(s) IV Intermittent every 8 hours    MEDICATIONS  (PRN):  dextrose Gel 1 Dose(s) Oral once PRN Blood Glucose LESS THAN 70 milliGRAM(s)/deciliter  glucagon  Injectable 1 milliGRAM(s) IntraMuscular once PRN Glucose LESS THAN 70 milligrams/deciliter      ALLERGIES:  No Known Allergies      LABS:                           7.7    30.7  )-----------( 274      ( 21 Jun 2018 07:30 )             26.5     06-21    138  |  93<L>  |  82<H>  ----------------------------<  228<H>  5.7<H>   |  23  |  4.11<H>    Ca    9.0      21 Jun 2018 07:30  Phos  7.0     06-21  Mg     2.4     06-21    TPro  8.7<H>  /  Alb  1.8<L>  /  TBili  0.4  /  DBili  x   /  AST  84<H>  /  ALT  87<H>  /  AlkPhos  303<H>  06-21    PT/INR - ( 22 Jun 2018 08:02 )   PT: 18.8 sec;   INR: 1.68            CAPILLARY BLOOD GLUCOSE  POCT Blood Glucose.: 179 mg/dL (22 Jun 2018 06:44)  POCT Blood Glucose.: 242 mg/dL (21 Jun 2018 23:46)  POCT Blood Glucose.: 234 mg/dL (21 Jun 2018 22:04)  POCT Blood Glucose.: 271 mg/dL (21 Jun 2018 16:52)  POCT Blood Glucose.: 163 mg/dL (21 Jun 2018 12:03)    I&O's Summary    21 Jun 2018 07:01  -  22 Jun 2018 07:00  --------------------------------------------------------  IN: 620 mL / OUT: 1600 mL / NET: -980 mL          RADIOLOGY & ADDITIONAL TESTS: Reviewed.

## 2018-06-22 NOTE — PROGRESS NOTE ADULT - PROBLEM SELECTOR PLAN 4
Stable. Persistently hypotensive throughout admission, now on midodrine 15 mg TID and hydrocortisone 10mg BID for concern of adrenal insufficiency. Has intermittently used albumin to tolerate dialysis.   - c/w Midodrine 10 mg q8h and additional 10 midodrine pre-HD  - hydrocortisone 10mg BID

## 2018-06-22 NOTE — PROGRESS NOTE ADULT - GASTROINTESTINAL
detailed exam
negative
detailed exam
negative
detailed exam
negative
detailed exam
detailed exam
negative
detailed exam

## 2018-06-22 NOTE — PROGRESS NOTE ADULT - PROBLEM SELECTOR PLAN 9
CHF with EF 10-15% 2/2 ischemic cardiomyopathy s/p AICD. Patient with persistent pleural effusions on CXR and US and b/l dependent edema.  - HOLDING ACE/BB in setting of hypotension  - c/w midodrine TID  - EP to turn off AICD today per family wishes     #CAD- Hx of MI and ischemic CM s/p AICD, STEMI 7/2017.  - discontinue Aspirin and statin to reduce pill burden

## 2018-06-22 NOTE — PROGRESS NOTE ADULT - PROBLEM SELECTOR PLAN 2
Anemia of chronic renal disease with hgb 7.7  No iron deficiency   Defer EPO on next HD given his overall prognosis and plans of care

## 2018-06-22 NOTE — PROGRESS NOTE ADULT - SUBJECTIVE AND OBJECTIVE BOX
Patient seen and examined at bedside.     Chief Complaint: Need for dialysis, abnormal labs, buttock ulcers     Had HD yesterday on 6/21  Completed session but was complicated by intradialytic hypotension.  Patient unable to provide history  Remains on advanced life support    Reviewed palliative/ethics/primary team updates  The plan at present seems to be one final HD session on Saturday and then to proceed to comfort care measures.     acetaminophen    Suspension. 650 milliGRAM(s) every 8 hours  acetylcysteine 20% Inhalation 4 milliLiter(s) two times a day  aspirin  chewable 81 milliGRAM(s) daily  collagenase Ointment 1 Application(s) daily  escitalopram 5 milliGRAM(s) daily  hydrocortisone 10 milliGRAM(s) every 12 hours  insulin glargine Injectable (LANTUS) 7 Unit(s) at bedtime  insulin regular  human corrective regimen sliding scale   <User Schedule>  insulin regular  human recombinant 17 Unit(s) every 6 hours  levETIRAcetam  Solution 500 milliGRAM(s) every 24 hours  metoclopramide 5 milliGRAM(s) every 12 hours  midodrine 10 milliGRAM(s) every 8 hours  modafinil 100 milliGRAM(s) daily      Allergies    No Known Allergies    Intolerances        T(C): , Max: 37.9 (06-21-18 @ 21:52)  T(F): , Max: 100.2 (06-21-18 @ 21:52)  HR: 105 (06-22-18 @ 06:47)  BP: 101/71 (06-22-18 @ 06:47)  BP(mean): --  RR: 18 (06-22-18 @ 06:47)  SpO2: 100% (06-22-18 @ 06:47)  Wt(kg): --    06-21 @ 07:01  -  06-22 @ 07:00  --------------------------------------------------------  IN:    Jevity: 620 mL  Total IN: 620 mL    OUT:    Other: 1600 mL  Total OUT: 1600 mL    Total NET: -980 mL              LABS:                        7.7    30.7  )-----------( 274      ( 21 Jun 2018 07:30 )             26.5     06-21    138  |  93<L>  |  82<H>  ----------------------------<  228<H>  5.7<H>   |  23  |  4.11<H>    Ca    9.0      21 Jun 2018 07:30  Phos  7.0     06-21  Mg     2.4     06-21    TPro  8.7<H>  /  Alb  1.8<L>  /  TBili  0.4  /  DBili  x   /  AST  84<H>  /  ALT  87<H>  /  AlkPhos  303<H>  06-21      PT/INR - ( 22 Jun 2018 08:02 )   PT: 18.8 sec;   INR: 1.68                    RADIOLOGY & ADDITIONAL STUDIES:

## 2018-06-23 LAB
ANION GAP SERPL CALC-SCNC: 17 MMOL/L — SIGNIFICANT CHANGE UP (ref 5–17)
BUN SERPL-MCNC: 72 MG/DL — HIGH (ref 7–23)
CALCIUM SERPL-MCNC: 9 MG/DL — SIGNIFICANT CHANGE UP (ref 8.4–10.5)
CHLORIDE SERPL-SCNC: 98 MMOL/L — SIGNIFICANT CHANGE UP (ref 96–108)
CO2 SERPL-SCNC: 28 MMOL/L — SIGNIFICANT CHANGE UP (ref 22–31)
CREAT SERPL-MCNC: 3.83 MG/DL — HIGH (ref 0.5–1.3)
GLUCOSE BLDC GLUCOMTR-MCNC: 100 MG/DL — HIGH (ref 70–99)
GLUCOSE BLDC GLUCOMTR-MCNC: 104 MG/DL — HIGH (ref 70–99)
GLUCOSE BLDC GLUCOMTR-MCNC: 123 MG/DL — HIGH (ref 70–99)
GLUCOSE BLDC GLUCOMTR-MCNC: 219 MG/DL — HIGH (ref 70–99)
GLUCOSE BLDC GLUCOMTR-MCNC: 68 MG/DL — LOW (ref 70–99)
GLUCOSE BLDC GLUCOMTR-MCNC: 91 MG/DL — SIGNIFICANT CHANGE UP (ref 70–99)
GLUCOSE BLDC GLUCOMTR-MCNC: 95 MG/DL — SIGNIFICANT CHANGE UP (ref 70–99)
GLUCOSE SERPL-MCNC: 88 MG/DL — SIGNIFICANT CHANGE UP (ref 70–99)
HCT VFR BLD CALC: 28.9 % — LOW (ref 39–50)
HGB BLD-MCNC: 8.2 G/DL — LOW (ref 13–17)
MAGNESIUM SERPL-MCNC: 2.3 MG/DL — SIGNIFICANT CHANGE UP (ref 1.6–2.6)
MCHC RBC-ENTMCNC: 25.9 PG — LOW (ref 27–34)
MCHC RBC-ENTMCNC: 28.4 G/DL — LOW (ref 32–36)
MCV RBC AUTO: 91.2 FL — SIGNIFICANT CHANGE UP (ref 80–100)
PHOSPHATE SERPL-MCNC: 6.9 MG/DL — HIGH (ref 2.5–4.5)
PLATELET # BLD AUTO: 295 K/UL — SIGNIFICANT CHANGE UP (ref 150–400)
POTASSIUM SERPL-MCNC: 5.2 MMOL/L — SIGNIFICANT CHANGE UP (ref 3.5–5.3)
POTASSIUM SERPL-SCNC: 5.2 MMOL/L — SIGNIFICANT CHANGE UP (ref 3.5–5.3)
RBC # BLD: 3.17 M/UL — LOW (ref 4.2–5.8)
RBC # FLD: 19.4 % — HIGH (ref 10.3–16.9)
SODIUM SERPL-SCNC: 143 MMOL/L — SIGNIFICANT CHANGE UP (ref 135–145)
WBC # BLD: 19.4 K/UL — HIGH (ref 3.8–10.5)
WBC # FLD AUTO: 19.4 K/UL — HIGH (ref 3.8–10.5)

## 2018-06-23 PROCEDURE — 99233 SBSQ HOSP IP/OBS HIGH 50: CPT | Mod: GC

## 2018-06-23 RX ADMIN — INSULIN GLARGINE 7 UNIT(S): 100 INJECTION, SOLUTION SUBCUTANEOUS at 00:42

## 2018-06-23 RX ADMIN — Medication 10 MILLIGRAM(S): at 07:17

## 2018-06-23 RX ADMIN — Medication 650 MILLIGRAM(S): at 14:47

## 2018-06-23 RX ADMIN — Medication 650 MILLIGRAM(S): at 07:17

## 2018-06-23 RX ADMIN — Medication 650 MILLIGRAM(S): at 00:46

## 2018-06-23 RX ADMIN — HYDROMORPHONE HYDROCHLORIDE 0.25 MILLIGRAM(S): 2 INJECTION INTRAMUSCULAR; INTRAVENOUS; SUBCUTANEOUS at 16:55

## 2018-06-23 RX ADMIN — Medication 4 MILLILITER(S): at 18:04

## 2018-06-23 RX ADMIN — MIDODRINE HYDROCHLORIDE 10 MILLIGRAM(S): 2.5 TABLET ORAL at 00:41

## 2018-06-23 RX ADMIN — HYDROMORPHONE HYDROCHLORIDE 0.25 MILLIGRAM(S): 2 INJECTION INTRAMUSCULAR; INTRAVENOUS; SUBCUTANEOUS at 02:39

## 2018-06-23 RX ADMIN — LEVETIRACETAM 500 MILLIGRAM(S): 250 TABLET, FILM COATED ORAL at 14:49

## 2018-06-23 RX ADMIN — Medication 650 MILLIGRAM(S): at 23:51

## 2018-06-23 RX ADMIN — MIDODRINE HYDROCHLORIDE 10 MILLIGRAM(S): 2.5 TABLET ORAL at 14:48

## 2018-06-23 RX ADMIN — INSULIN HUMAN 17 UNIT(S): 100 INJECTION, SOLUTION SUBCUTANEOUS at 07:17

## 2018-06-23 RX ADMIN — HYDROMORPHONE HYDROCHLORIDE 0.25 MILLIGRAM(S): 2 INJECTION INTRAMUSCULAR; INTRAVENOUS; SUBCUTANEOUS at 16:22

## 2018-06-23 RX ADMIN — MIDODRINE HYDROCHLORIDE 10 MILLIGRAM(S): 2.5 TABLET ORAL at 23:51

## 2018-06-23 RX ADMIN — ESCITALOPRAM OXALATE 5 MILLIGRAM(S): 10 TABLET, FILM COATED ORAL at 14:47

## 2018-06-23 RX ADMIN — HYDROMORPHONE HYDROCHLORIDE 0.25 MILLIGRAM(S): 2 INJECTION INTRAMUSCULAR; INTRAVENOUS; SUBCUTANEOUS at 20:42

## 2018-06-23 RX ADMIN — HYDROMORPHONE HYDROCHLORIDE 0.25 MILLIGRAM(S): 2 INJECTION INTRAMUSCULAR; INTRAVENOUS; SUBCUTANEOUS at 20:26

## 2018-06-23 RX ADMIN — HYDROMORPHONE HYDROCHLORIDE 0.25 MILLIGRAM(S): 2 INJECTION INTRAMUSCULAR; INTRAVENOUS; SUBCUTANEOUS at 10:37

## 2018-06-23 RX ADMIN — Medication 10 MILLIGRAM(S): at 18:04

## 2018-06-23 RX ADMIN — Medication 1 APPLICATION(S): at 14:50

## 2018-06-23 RX ADMIN — Medication 5 MILLIGRAM(S): at 20:26

## 2018-06-23 RX ADMIN — MIDODRINE HYDROCHLORIDE 10 MILLIGRAM(S): 2.5 TABLET ORAL at 07:17

## 2018-06-23 RX ADMIN — HYDROMORPHONE HYDROCHLORIDE 0.25 MILLIGRAM(S): 2 INJECTION INTRAMUSCULAR; INTRAVENOUS; SUBCUTANEOUS at 11:07

## 2018-06-23 RX ADMIN — Medication 650 MILLIGRAM(S): at 15:17

## 2018-06-23 RX ADMIN — INSULIN HUMAN 2: 100 INJECTION, SOLUTION SUBCUTANEOUS at 18:04

## 2018-06-23 RX ADMIN — Medication 4 MILLILITER(S): at 07:17

## 2018-06-23 RX ADMIN — HYDROMORPHONE HYDROCHLORIDE 0.25 MILLIGRAM(S): 2 INJECTION INTRAMUSCULAR; INTRAVENOUS; SUBCUTANEOUS at 07:17

## 2018-06-23 RX ADMIN — Medication 81 MILLIGRAM(S): at 14:47

## 2018-06-23 RX ADMIN — Medication 5 MILLIGRAM(S): at 07:16

## 2018-06-23 RX ADMIN — Medication 650 MILLIGRAM(S): at 00:41

## 2018-06-23 RX ADMIN — MODAFINIL 100 MILLIGRAM(S): 200 TABLET ORAL at 14:48

## 2018-06-23 RX ADMIN — INSULIN HUMAN 17 UNIT(S): 100 INJECTION, SOLUTION SUBCUTANEOUS at 00:45

## 2018-06-23 RX ADMIN — HYDROMORPHONE HYDROCHLORIDE 0.25 MILLIGRAM(S): 2 INJECTION INTRAMUSCULAR; INTRAVENOUS; SUBCUTANEOUS at 02:38

## 2018-06-23 RX ADMIN — HYDROMORPHONE HYDROCHLORIDE 0.25 MILLIGRAM(S): 2 INJECTION INTRAMUSCULAR; INTRAVENOUS; SUBCUTANEOUS at 07:19

## 2018-06-23 RX ADMIN — INSULIN HUMAN 17 UNIT(S): 100 INJECTION, SOLUTION SUBCUTANEOUS at 18:05

## 2018-06-23 RX ADMIN — Medication 650 MILLIGRAM(S): at 07:19

## 2018-06-23 NOTE — PROGRESS NOTE ADULT - SUBJECTIVE AND OBJECTIVE BOX
PGY1 PROGRESS NOTE:    CC: septic Shock due to bilateral cellulitis - course complicated by renal failure (on hemodialysis), stroke, and respiratory failure. (01 Jun 2018 14:51)    OVERNIGHT EVENTS: ELSIE. Patient appeared more comfortable on Dilaudid ATC.    SUBJECTIVE / INTERVAL HPI: EP turned off AICD yesterday. Will get last HD session today. More somnolent on exam.    ROS: negative unless otherwise specified    VITAL SIGNS:  T(F): 97.6 (22 Jun 2018 15:52), Max: 99.3 (22 Jun 2018 09:25)  HR: 97 (23 Jun 2018 04:40) (93 - 105)  BP: 85/58 (22 Jun 2018 15:52) (85/58 - 101/71)  RR: 14 (23 Jun 2018 04:40) (14 - 21)  SpO2: 99% (23 Jun 2018 04:40) (99% - 100%)    PHYSICAL EXAM:  Constitutional: thin, cachectic, chronically ill appearing, NAD, perspiration throughout  HEENT: temporal wasting, PERRLA, EOMI, dry MM  Neck: No JVD, tracheostomy with minimal mucoid discharge  Respiratory: transmitted upper resp sounds, course breath sounds b/l, rhonchi b/l  Cardiovascular: AICD in place, normal S1S2, no MRG  Gastrointestinal: non-distended, soft, +BS, +PEG tube, +rectal tube with green-brown loose stools  Extremities: hyperpigmented skin on anterior shin RLE, trace dependent edema, hands in braces to prevent contracture  Musculoskeletal: increased rigidity in UE, atrophied musculature in b/l LE  Vascular: 1+ peripheral pulses  Neurological: intermittently able to follow commands, moving all extremities spontaneously, no focal deficits  Skin: Large [id number] unstageable sacral ulcer with exposed necrotic tissue and muscles and tracking underneath in all directions, foul smelling, black eschar overlying tissue, non-bleeding, no purulent material    MEDICATIONS:  MEDICATIONS  (STANDING):  acetaminophen    Suspension. 650 milliGRAM(s) Oral every 8 hours  acetylcysteine 20% Inhalation 4 milliLiter(s) Inhalation two times a day  aspirin  chewable 81 milliGRAM(s) Oral daily  atorvastatin 80 milliGRAM(s) Oral at bedtime  collagenase Ointment 1 Application(s) Topical daily  dextrose 5%. 1000 milliLiter(s) (50 mL/Hr) IV Continuous <Continuous>  dextrose 50% Injectable 12.5 Gram(s) IV Push once  dextrose 50% Injectable 25 Gram(s) IV Push once  dextrose 50% Injectable 25 Gram(s) IV Push once  escitalopram 5 milliGRAM(s) Oral daily  fluconAZOLE IVPB 200 milliGRAM(s) IV Intermittent every 24 hours  folic acid 1 milliGRAM(s) Oral daily  hydrocortisone 10 milliGRAM(s) Oral every 12 hours  insulin glargine Injectable (LANTUS) 7 Unit(s) SubCutaneous at bedtime  insulin regular  human corrective regimen sliding scale   SubCutaneous <User Schedule>  insulin regular  human recombinant 17 Unit(s) SubCutaneous every 6 hours  levETIRAcetam  Solution 500 milliGRAM(s) Oral every 24 hours  metoclopramide 5 milliGRAM(s) Oral every 12 hours  midodrine 10 milliGRAM(s) Oral every 8 hours  modafinil 100 milliGRAM(s) Oral daily  multivitamin 1 Tablet(s) Oral daily  piperacillin/tazobactam IVPB. 2.25 Gram(s) IV Intermittent every 8 hours    MEDICATIONS  (PRN):  dextrose Gel 1 Dose(s) Oral once PRN Blood Glucose LESS THAN 70 milliGRAM(s)/deciliter  glucagon  Injectable 1 milliGRAM(s) IntraMuscular once PRN Glucose LESS THAN 70 milligrams/deciliter      ALLERGIES:  No Known Allergies      LABS:                     I&O's Summary    21 Jun 2018 07:01  -  22 Jun 2018 07:00  --------------------------------------------------------  IN: 620 mL / OUT: 1600 mL / NET: -980 mL          RADIOLOGY & ADDITIONAL TESTS: Reviewed. PGY1 PROGRESS NOTE:    CC: septic Shock due to bilateral cellulitis - course complicated by renal failure (on hemodialysis), stroke, and respiratory failure. (01 Jun 2018 14:51)    OVERNIGHT EVENTS: ELSIE. Patient appeared more comfortable on Dilaudid ATC.    SUBJECTIVE / INTERVAL HPI: EP turned off AICD yesterday. Will get last HD session today. More somnolent on exam.    ROS: negative unless otherwise specified    VITAL SIGNS:  T(F): 97.6 (22 Jun 2018 15:52), Max: 99.3 (22 Jun 2018 09:25)  HR: 97 (23 Jun 2018 04:40) (93 - 105)  BP: 85/58 (22 Jun 2018 15:52) (85/58 - 101/71)  RR: 14 (23 Jun 2018 04:40) (14 - 21)  SpO2: 99% (23 Jun 2018 04:40) (99% - 100%)    PHYSICAL EXAM:  Constitutional: thin, cachectic, chronically ill appearing, NAD, perspiration throughout  HEENT: temporal wasting, PERRLA, EOMI, dry MM  Neck: No JVD, tracheostomy with minimal mucoid discharge  Respiratory: transmitted upper resp sounds, course breath sounds b/l, rhonchi b/l  Cardiovascular: AICD in place, normal S1S2, no MRG  Gastrointestinal: non-distended, soft, +BS, +PEG tube, +rectal tube with green-brown loose stools  Extremities: hyperpigmented skin on anterior shin RLE, trace dependent edema, hands in braces to prevent contracture  Musculoskeletal: increased rigidity in UE, atrophied musculature in b/l LE  Vascular: 1+ peripheral pulses  Neurological: intermittently able to follow commands, moving all extremities spontaneously, no focal deficits  Skin: Large [id number] unstageable sacral ulcer with exposed necrotic tissue and muscles and tracking underneath in all directions, foul smelling, black eschar overlying tissue, non-bleeding, no purulent material    MEDICATIONS:  MEDICATIONS  (STANDING):  acetaminophen    Suspension. 650 milliGRAM(s) Oral every 8 hours  acetylcysteine 20% Inhalation 4 milliLiter(s) Inhalation two times a day  aspirin  chewable 81 milliGRAM(s) Oral daily  atorvastatin 80 milliGRAM(s) Oral at bedtime  collagenase Ointment 1 Application(s) Topical daily  dextrose 5%. 1000 milliLiter(s) (50 mL/Hr) IV Continuous <Continuous>  dextrose 50% Injectable 12.5 Gram(s) IV Push once  dextrose 50% Injectable 25 Gram(s) IV Push once  dextrose 50% Injectable 25 Gram(s) IV Push once  escitalopram 5 milliGRAM(s) Oral daily  fluconAZOLE IVPB 200 milliGRAM(s) IV Intermittent every 24 hours  folic acid 1 milliGRAM(s) Oral daily  hydrocortisone 10 milliGRAM(s) Oral every 12 hours  insulin glargine Injectable (LANTUS) 7 Unit(s) SubCutaneous at bedtime  insulin regular  human corrective regimen sliding scale   SubCutaneous <User Schedule>  insulin regular  human recombinant 17 Unit(s) SubCutaneous every 6 hours  levETIRAcetam  Solution 500 milliGRAM(s) Oral every 24 hours  metoclopramide 5 milliGRAM(s) Oral every 12 hours  midodrine 10 milliGRAM(s) Oral every 8 hours  modafinil 100 milliGRAM(s) Oral daily  multivitamin 1 Tablet(s) Oral daily  piperacillin/tazobactam IVPB. 2.25 Gram(s) IV Intermittent every 8 hours    MEDICATIONS  (PRN):  dextrose Gel 1 Dose(s) Oral once PRN Blood Glucose LESS THAN 70 milliGRAM(s)/deciliter  glucagon  Injectable 1 milliGRAM(s) IntraMuscular once PRN Glucose LESS THAN 70 milligrams/deciliter      ALLERGIES:  No Known Allergies      LABS:                                    8.2    19.4  )-----------( 295      ( 23 Jun 2018 07:06 )             28.9     06-23    143  |  98  |  72<H>  ----------------------------<  88  5.2   |  28  |  3.83<H>    Ca    9.0      23 Jun 2018 07:06  Phos  6.9     06-23  Mg     2.3     06-23      PT/INR - ( 22 Jun 2018 08:02 )   PT: 18.8 sec;   INR: 1.68            CAPILLARY BLOOD GLUCOSE  POCT Blood Glucose.: 100 mg/dL (23 Jun 2018 06:51)  POCT Blood Glucose.: 104 mg/dL (23 Jun 2018 00:38)  POCT Blood Glucose.: 87 mg/dL (22 Jun 2018 22:01)  POCT Blood Glucose.: 106 mg/dL (22 Jun 2018 17:29)  POCT Blood Glucose.: 140 mg/dL (22 Jun 2018 11:50)          RADIOLOGY & ADDITIONAL TESTS: Reviewed. PGY1 PROGRESS NOTE:    CC: septic Shock due to bilateral cellulitis - course complicated by renal failure (on hemodialysis), stroke, and respiratory failure. (01 Jun 2018 14:51)    OVERNIGHT EVENTS: ELSIE. Patient appeared more comfortable on Dilaudid ATC.    SUBJECTIVE / INTERVAL HPI: EP turned off AICD yesterday. Will get last HD session today. More somnolent on exam but comfortable appearing.    ROS: negative unless otherwise specified    VITAL SIGNS:  T(F): 97.6 (22 Jun 2018 15:52), Max: 99.3 (22 Jun 2018 09:25)  HR: 97 (23 Jun 2018 04:40) (93 - 105)  BP: 85/58 (22 Jun 2018 15:52) (85/58 - 101/71)  RR: 14 (23 Jun 2018 04:40) (14 - 21)  SpO2: 99% (23 Jun 2018 04:40) (99% - 100%)    PHYSICAL EXAM:  Constitutional: thin, cachectic, chronically ill appearing, NAD   HEENT: temporal wasting, PERRLA, EOMI, dry MM  Neck: No JVD, tracheostomy with minimal mucoid discharge  Respiratory: transmitted upper resp sounds, course breath sounds b/l, rhonchi b/l  Cardiovascular: AICD in place, normal S1S2, no MRG  Gastrointestinal: non-distended, soft, +BS, +PEG tube, +rectal tube with green-brown loose stools  Extremities: hyperpigmented skin on anterior shin RLE, trace dependent edema, hands in braces to prevent contracture  Musculoskeletal: increased rigidity in UE, atrophied musculature in b/l LE  Vascular: 1+ peripheral pulses  Neurological: intermittently able to follow commands, moving all extremities spontaneously, no focal deficits  Skin: Large [id number] unstageable sacral ulcer with exposed necrotic tissue and muscles and tracking underneath in all directions, foul smelling, black eschar overlying tissue, non-bleeding, no purulent material    MEDICATIONS:  MEDICATIONS  (STANDING):  acetaminophen    Suspension. 650 milliGRAM(s) Oral every 8 hours  acetylcysteine 20% Inhalation 4 milliLiter(s) Inhalation two times a day  aspirin  chewable 81 milliGRAM(s) Oral daily  atorvastatin 80 milliGRAM(s) Oral at bedtime  collagenase Ointment 1 Application(s) Topical daily  dextrose 5%. 1000 milliLiter(s) (50 mL/Hr) IV Continuous <Continuous>  dextrose 50% Injectable 12.5 Gram(s) IV Push once  dextrose 50% Injectable 25 Gram(s) IV Push once  dextrose 50% Injectable 25 Gram(s) IV Push once  escitalopram 5 milliGRAM(s) Oral daily  fluconAZOLE IVPB 200 milliGRAM(s) IV Intermittent every 24 hours  folic acid 1 milliGRAM(s) Oral daily  hydrocortisone 10 milliGRAM(s) Oral every 12 hours  insulin glargine Injectable (LANTUS) 7 Unit(s) SubCutaneous at bedtime  insulin regular  human corrective regimen sliding scale   SubCutaneous <User Schedule>  insulin regular  human recombinant 17 Unit(s) SubCutaneous every 6 hours  levETIRAcetam  Solution 500 milliGRAM(s) Oral every 24 hours  metoclopramide 5 milliGRAM(s) Oral every 12 hours  midodrine 10 milliGRAM(s) Oral every 8 hours  modafinil 100 milliGRAM(s) Oral daily  multivitamin 1 Tablet(s) Oral daily  piperacillin/tazobactam IVPB. 2.25 Gram(s) IV Intermittent every 8 hours    MEDICATIONS  (PRN):  dextrose Gel 1 Dose(s) Oral once PRN Blood Glucose LESS THAN 70 milliGRAM(s)/deciliter  glucagon  Injectable 1 milliGRAM(s) IntraMuscular once PRN Glucose LESS THAN 70 milligrams/deciliter      ALLERGIES:  No Known Allergies      LABS:                                    8.2    19.4  )-----------( 295      ( 23 Jun 2018 07:06 )             28.9     06-23    143  |  98  |  72<H>  ----------------------------<  88  5.2   |  28  |  3.83<H>    Ca    9.0      23 Jun 2018 07:06  Phos  6.9     06-23  Mg     2.3     06-23      PT/INR - ( 22 Jun 2018 08:02 )   PT: 18.8 sec;   INR: 1.68       CAPILLARY BLOOD GLUCOSE  POCT Blood Glucose.: 100 mg/dL (23 Jun 2018 06:51)  POCT Blood Glucose.: 104 mg/dL (23 Jun 2018 00:38)  POCT Blood Glucose.: 87 mg/dL (22 Jun 2018 22:01)  POCT Blood Glucose.: 106 mg/dL (22 Jun 2018 17:29)  POCT Blood Glucose.: 140 mg/dL (22 Jun 2018 11:50)          RADIOLOGY & ADDITIONAL TESTS: Reviewed.

## 2018-06-23 NOTE — PROGRESS NOTE ADULT - PROBLEM SELECTOR PLAN 7
Hx of AF and LV thrombus on Coumadin prior to admission.   - can consider Lopressor pushes for comfort if HR >120    #LV thrombus- LV thrombus noted on RUKHSANA on 4/10. No more AC per family.

## 2018-06-23 NOTE — PROGRESS NOTE ADULT - PROBLEM SELECTOR PLAN 2
Anemia of chronic renal disease with hgb 8.3  - no need to continue with  epo - the patient is comfort care after the HD today

## 2018-06-23 NOTE — PROGRESS NOTE ADULT - SUBJECTIVE AND OBJECTIVE BOX
Seen in the morning, in his room, on  ventilator through tracheostomy, does not follow commands, sleeping     The renal service is for the need of HD     Patient seen and examined at bedside.     acetaminophen    Suspension. 650 milliGRAM(s) every 8 hours  acetylcysteine 20% Inhalation 4 milliLiter(s) two times a day  aspirin  chewable 81 milliGRAM(s) daily  collagenase Ointment 1 Application(s) daily  escitalopram 5 milliGRAM(s) daily  hydrocortisone 10 milliGRAM(s) every 12 hours  HYDROmorphone  Injectable 0.25 milliGRAM(s) every 4 hours  HYDROmorphone  Injectable 0.25 milliGRAM(s) every 1 hour PRN  insulin glargine Injectable (LANTUS) 7 Unit(s) at bedtime  insulin regular  human corrective regimen sliding scale   <User Schedule>  insulin regular  human recombinant 17 Unit(s) every 6 hours  levETIRAcetam  Solution 500 milliGRAM(s) every 24 hours  metoclopramide 5 milliGRAM(s) every 12 hours  midodrine 10 milliGRAM(s) every 8 hours  modafinil 100 milliGRAM(s) daily  scopolamine   Patch 1 Patch every 3 days      Allergies    No Known Allergies    Intolerances        T(C): , Max: 37.1 (06-23-18 @ 09:25)  T(F): , Max: 98.7 (06-23-18 @ 09:25)  HR: 79 (06-23-18 @ 10:50)  BP: 130/69 (06-23-18 @ 10:50)  BP(mean): --  RR: 14 (06-23-18 @ 10:50)  SpO2: 100% (06-23-18 @ 10:50)  Wt(kg): --    06-22 @ 07:01  -  06-23 @ 07:00  --------------------------------------------------------  IN:    Enteral Tube Flush: 200 mL    Jevity: 700 mL  Total IN: 900 mL    OUT:    Rectal Tube: 600 mL  Total OUT: 600 mL    Total NET: 300 mL      Physical exam';   Sleeping, does not follow commands  No JVD   Normal air entry into the lungs, no wheezing, no crackles, no rales   RRR, normal s1/s2, no murmurs, rubs or gallops   Abdomen - soft, not tender, not distended   Extremities: no edema       LABS:                        8.2    19.4  )-----------( 295      ( 23 Jun 2018 07:06 )             28.9     06-23    143  |  98  |  72<H>  ----------------------------<  88  5.2   |  28  |  3.83<H>    Ca    9.0      23 Jun 2018 07:06  Phos  6.9     06-23  Mg     2.3     06-23        PT/INR - ( 22 Jun 2018 08:02 )   PT: 18.8 sec;   INR: 1.68                    RADIOLOGY & ADDITIONAL STUDIES:

## 2018-06-23 NOTE — PROGRESS NOTE ADULT - PROBLEM SELECTOR PLAN 10
VTE: decision for no AC    F: No IVF  E: No repletion of lytes, last HD on Saturday  N: Jevity 1.5 goal rate 68cc/hr  Dispo: family meeting today with decision for discontinuation of abx therapy, comfort measures, no escalation of antibiotics, no MEWs, no escalation of care. Plan to remove wrist splints today, turn off AICD, and perform last HD on Saturday.  *On Hydromorphone 0.25mg IV q4hrs ATC and Hydromorphone 0.25mg IV q1hr PRN breakthrough

## 2018-06-23 NOTE — PROGRESS NOTE ADULT - PROBLEM SELECTOR PLAN 1
- ESRD TTS schedule for now with right chest wall tunnel HD catheter.  As per the palliative care after this session   - volume status acceptable   - electrolytes noted - 5.3 - we will do 2 k bath   - taking under consideration the palliative care after this session of HD - the renal service will sign off the case   - please contact us if there is a change of the plan

## 2018-06-23 NOTE — PROGRESS NOTE ADULT - PROBLEM SELECTOR PLAN 6
ARF likely 2/2 to ischemic ATN with hypoperfusion in setting of shock this admission. Now on HD through R sided Permacath.   - last HD today per family

## 2018-06-23 NOTE — PROGRESS NOTE ADULT - PROBLEM SELECTOR PLAN 5
Stable. Likely multifactorial in setting of septic and cardiogenic shock as well as brain stem infarct during admission.   - c/w Keppra  - will c/w Modafinil for now, but consider dc'ing if no utility and patient heavily sedated with hydromorphone

## 2018-06-23 NOTE — PROGRESS NOTE ADULT - PROBLEM SELECTOR PLAN 8
Stable. Likely 2/2 CKD. Iron studies consistent with anemia of chronic disease.   - no transfusions per family

## 2018-06-23 NOTE — PROGRESS NOTE ADULT - PROBLEM SELECTOR PLAN 1
Resolved. Meeting SIRS criteria with leukocytosis and fever on 6/15. Source likely unsteagable/incurable sacral ulcer with e/o osteo. Was on Vanc and Zosyn, however, in this situation where patient has non-surgical sacral wound and poor prognosis, little utility in continuing abx at this time as patient likely to develop resistance  - antibiotics discontinued as patient has a chronic incurable infection. Discussed at family meeting.   - decision for no escalation of antibiotics, no escalation of care, no MEWS    #Sacral decubitus ulcer- unstageable s/p debridement with plastic surgery. Cultures growing multiple organisms including Pseudomonas and E faecium. Pelvic MRI showing acute osteomyelitis and loculated collection abutting the bladder. Patient is not a surgical candidate based on his comorbidities and inability lay in prone positioning post-op (a0orlux).   - discontinued abx as above   - pain control   - frequent turning q2h for pressure offloading, air fluid mattress  - wound care with dry dressings with Allevyn over top  - soilage control with rectal tube, benefits outweigh risks at this point   - Nutrition support    #UTI- Patient with urinary retention requiring chronic indwelling Jose catheter. Urine cultures growing Sherrie Dubliniensis sensitive to Fluconazole.  - received last dose of Fluconazole

## 2018-06-23 NOTE — PROGRESS NOTE ADULT - ASSESSMENT
Patient is a 63 year old male with a history of HFrEF 10-15% due to ischemic cardiomyopathy, s/p AICD, ?atrial fibrillation, hypertension, diabetes mellitus type 2, gout, and CKD for whom nephrology was called for GILMER from ATN requiring hemodialysis.   Currently dialysis dependent without recovery of renal function. Likely ESRD now as it has been over 90 days.   Now after the HD will be comfort care. Today the last HD session

## 2018-06-23 NOTE — PROGRESS NOTE ADULT - SUBJECTIVE AND OBJECTIVE BOX
Patient was seen and evaluated on dialysis.   Patient is tolerating the procedure well.   HR: 79 (06-23-18 @ 10:50)  BP: 109/70  Continue dialysis:   Dialyzer:  180   QB:   400     QD: 500   Goal UF 1 hour over 3 Hours

## 2018-06-24 LAB
GLUCOSE BLDC GLUCOMTR-MCNC: 107 MG/DL — HIGH (ref 70–99)
GLUCOSE BLDC GLUCOMTR-MCNC: 127 MG/DL — HIGH (ref 70–99)
GLUCOSE BLDC GLUCOMTR-MCNC: 138 MG/DL — HIGH (ref 70–99)
GLUCOSE BLDC GLUCOMTR-MCNC: 143 MG/DL — HIGH (ref 70–99)
GLUCOSE BLDC GLUCOMTR-MCNC: 215 MG/DL — HIGH (ref 70–99)
GLUCOSE BLDC GLUCOMTR-MCNC: 326 MG/DL — HIGH (ref 70–99)

## 2018-06-24 PROCEDURE — 99233 SBSQ HOSP IP/OBS HIGH 50: CPT | Mod: GC

## 2018-06-24 RX ORDER — INSULIN HUMAN 100 [IU]/ML
15 INJECTION, SOLUTION SUBCUTANEOUS EVERY 6 HOURS
Qty: 0 | Refills: 0 | Status: DISCONTINUED | OUTPATIENT
Start: 2018-06-24 | End: 2018-07-01

## 2018-06-24 RX ADMIN — MIDODRINE HYDROCHLORIDE 10 MILLIGRAM(S): 2.5 TABLET ORAL at 22:38

## 2018-06-24 RX ADMIN — INSULIN GLARGINE 7 UNIT(S): 100 INJECTION, SOLUTION SUBCUTANEOUS at 01:25

## 2018-06-24 RX ADMIN — Medication 10 MILLIGRAM(S): at 18:34

## 2018-06-24 RX ADMIN — Medication 4 MILLILITER(S): at 06:48

## 2018-06-24 RX ADMIN — Medication 5 MILLIGRAM(S): at 06:46

## 2018-06-24 RX ADMIN — Medication 81 MILLIGRAM(S): at 12:20

## 2018-06-24 RX ADMIN — HYDROMORPHONE HYDROCHLORIDE 0.25 MILLIGRAM(S): 2 INJECTION INTRAMUSCULAR; INTRAVENOUS; SUBCUTANEOUS at 18:33

## 2018-06-24 RX ADMIN — Medication 650 MILLIGRAM(S): at 22:41

## 2018-06-24 RX ADMIN — Medication 650 MILLIGRAM(S): at 06:46

## 2018-06-24 RX ADMIN — INSULIN HUMAN 17 UNIT(S): 100 INJECTION, SOLUTION SUBCUTANEOUS at 06:57

## 2018-06-24 RX ADMIN — Medication 650 MILLIGRAM(S): at 22:42

## 2018-06-24 RX ADMIN — INSULIN GLARGINE 7 UNIT(S): 100 INJECTION, SOLUTION SUBCUTANEOUS at 22:42

## 2018-06-24 RX ADMIN — MIDODRINE HYDROCHLORIDE 10 MILLIGRAM(S): 2.5 TABLET ORAL at 14:53

## 2018-06-24 RX ADMIN — INSULIN HUMAN 15 UNIT(S): 100 INJECTION, SOLUTION SUBCUTANEOUS at 18:35

## 2018-06-24 RX ADMIN — Medication 650 MILLIGRAM(S): at 07:42

## 2018-06-24 RX ADMIN — HYDROMORPHONE HYDROCHLORIDE 0.25 MILLIGRAM(S): 2 INJECTION INTRAMUSCULAR; INTRAVENOUS; SUBCUTANEOUS at 03:21

## 2018-06-24 RX ADMIN — HYDROMORPHONE HYDROCHLORIDE 0.25 MILLIGRAM(S): 2 INJECTION INTRAMUSCULAR; INTRAVENOUS; SUBCUTANEOUS at 19:00

## 2018-06-24 RX ADMIN — Medication 1 APPLICATION(S): at 12:20

## 2018-06-24 RX ADMIN — LEVETIRACETAM 500 MILLIGRAM(S): 250 TABLET, FILM COATED ORAL at 14:53

## 2018-06-24 RX ADMIN — MODAFINIL 100 MILLIGRAM(S): 200 TABLET ORAL at 12:19

## 2018-06-24 RX ADMIN — Medication 650 MILLIGRAM(S): at 00:51

## 2018-06-24 RX ADMIN — Medication 5 MILLIGRAM(S): at 18:34

## 2018-06-24 RX ADMIN — Medication 650 MILLIGRAM(S): at 15:30

## 2018-06-24 RX ADMIN — HYDROMORPHONE HYDROCHLORIDE 0.25 MILLIGRAM(S): 2 INJECTION INTRAMUSCULAR; INTRAVENOUS; SUBCUTANEOUS at 03:06

## 2018-06-24 RX ADMIN — Medication 10 MILLIGRAM(S): at 06:47

## 2018-06-24 RX ADMIN — INSULIN HUMAN 17 UNIT(S): 100 INJECTION, SOLUTION SUBCUTANEOUS at 12:37

## 2018-06-24 RX ADMIN — Medication 4 MILLILITER(S): at 18:34

## 2018-06-24 RX ADMIN — INSULIN HUMAN 2: 100 INJECTION, SOLUTION SUBCUTANEOUS at 12:36

## 2018-06-24 RX ADMIN — Medication 650 MILLIGRAM(S): at 14:53

## 2018-06-24 RX ADMIN — MIDODRINE HYDROCHLORIDE 10 MILLIGRAM(S): 2.5 TABLET ORAL at 06:46

## 2018-06-24 RX ADMIN — INSULIN HUMAN 4: 100 INJECTION, SOLUTION SUBCUTANEOUS at 06:57

## 2018-06-24 RX ADMIN — ESCITALOPRAM OXALATE 5 MILLIGRAM(S): 10 TABLET, FILM COATED ORAL at 12:19

## 2018-06-24 NOTE — PROGRESS NOTE ADULT - ASSESSMENT
63M PMH HFrEF 10-15% (ischemic), MI, s/p AICD vs PPM, possible Afib, HTN, DM2 on insulin, possible CKD, and gout, who presented with a chief complaint of generalized weakness (3/10/2018) s/p septic shock likely 2/2 LE cellulitis complicated by ATN requiring dialysis. Course complicated by AMS likely from brainstem infarct 2/2 LV thrombus vs toxic metabolic encephalopathy. Further complicated by development of candidemia and sepsis 2/2 klebsiella bacteremia s/p fluconazole and CTX, now resolved. s/p completed course of Zosyn for aspiration PNA.  Goals of care discussion for which patient made DNR, but with continued escalation of care. Continues to undergo management for respiratory failure with weaning as tolerated with CPAP, trach collar trials. With complications of hypotension, tolerating HD w/o albumin. Now with unsteageable incurable sacral wound and candidal UTI with uptrending WBC, on Zosyn.

## 2018-06-24 NOTE — PROGRESS NOTE ADULT - SUBJECTIVE AND OBJECTIVE BOX
Patient is a 63y old  Male who presents with a chief complaint of Septic Shock due to bilateral cellulitis - course complicated by renal failure (on hemodialysis), stroke, and respiratory failure. (01 Jun 2018 14:51)      INTERVAL HPI/OVERNIGHT EVENTS: Patient more alert and nods yes or no to questions. Pt w/o pain this morning.      Review of Systems: 12 point review of systems otherwise negative      MEDICATIONS  (STANDING):  acetaminophen    Suspension. 650 milliGRAM(s) Oral every 8 hours  acetylcysteine 20% Inhalation 4 milliLiter(s) Inhalation two times a day  aspirin  chewable 81 milliGRAM(s) Oral daily  collagenase Ointment 1 Application(s) Topical daily  escitalopram 5 milliGRAM(s) Oral daily  hydrocortisone 10 milliGRAM(s) Oral every 12 hours  HYDROmorphone  Injectable 0.25 milliGRAM(s) IV Push every 4 hours  insulin glargine Injectable (LANTUS) 7 Unit(s) SubCutaneous at bedtime  insulin regular  human corrective regimen sliding scale   SubCutaneous <User Schedule>  insulin regular  human recombinant 17 Unit(s) SubCutaneous every 6 hours  levETIRAcetam  Solution 500 milliGRAM(s) Oral every 24 hours  metoclopramide 5 milliGRAM(s) Oral every 12 hours  midodrine 10 milliGRAM(s) Oral every 8 hours  modafinil 100 milliGRAM(s) Oral daily  scopolamine   Patch 1 Patch Transdermal every 3 days    MEDICATIONS  (PRN):  HYDROmorphone  Injectable 0.25 milliGRAM(s) IV Push every 1 hour PRN Breakthrough pain      Allergies    No Known Allergies    Intolerances          Vital Signs Last 24 Hrs  T(C): 37.2 (24 Jun 2018 09:44), Max: 37.3 (23 Jun 2018 23:57)  T(F): 99 (24 Jun 2018 09:44), Max: 99.1 (23 Jun 2018 23:57)  HR: 97 (24 Jun 2018 09:44) (90 - 97)  BP: 81/54 (24 Jun 2018 09:44) (76/65 - 111/87)  BP(mean): --  RR: 20 (24 Jun 2018 09:44) (14 - 26)  SpO2: 100% (24 Jun 2018 09:44) (99% - 100%)  CAPILLARY BLOOD GLUCOSE      POCT Blood Glucose.: 326 mg/dL (24 Jun 2018 06:54)  POCT Blood Glucose.: 138 mg/dL (24 Jun 2018 00:48)  POCT Blood Glucose.: 68 mg/dL (23 Jun 2018 23:31)  POCT Blood Glucose.: 219 mg/dL (23 Jun 2018 17:42)  POCT Blood Glucose.: 95 mg/dL (23 Jun 2018 14:15)      06-23 @ 07:01  -  06-24 @ 07:00  --------------------------------------------------------  IN: 1123 mL / OUT: 0 mL / NET: 1123 mL        Physical Exam:    Constitutional: thin, cachectic, chronically ill appearing, NAD   HEENT: temporal wasting, PERRLA, EOMI, dry MM  Neck: No JVD, tracheostomy with minimal mucoid discharge  Respiratory: transmitted upper resp sounds, course breath sounds b/l, rhonchi b/l  Cardiovascular: AICD in place, normal S1S2, no MRG  Gastrointestinal: non-distended, soft, +BS, +PEG tube, +rectal tube  Extremities: hyperpigmented skin on anterior shin RLE, trace dependent edema  Musculoskeletal: increased rigidity in UE, atrophied musculature in b/l LE  Vascular: 1+ peripheral pulses  Neurological: intermittently able to follow commands, moving all extremities spontaneously, no focal deficits  Skin: Large [id number] unstageable sacral ulcer with exposed necrotic tissue and muscles and tracking underneath in all directions, foul smelling, black eschar overlying tissue, non-bleeding, no purulent material    LABS:                        8.2    19.4  )-----------( 295      ( 23 Jun 2018 07:06 )             28.9     06-23    143  |  98  |  72<H>  ----------------------------<  88  5.2   |  28  |  3.83<H>    Ca    9.0      23 Jun 2018 07:06  Phos  6.9     06-23  Mg     2.3     06-23              RADIOLOGY & ADDITIONAL TESTS:    ---------------------------------------------------------------------------  I personally reviewed: [  ]EKG   [  ]CXR    [  ] CT    [  ]Other  ---------------------------------------------------------------------------  PLEASE CHECK WHEN PRESENT:     [  ]Heart Failure     [  ] Acute     [  ] Acute on Chronic     [  ] Chronic  -------------------------------------------------------------------     [  ]Diastolic [HFpEF]     [  ]Systolic [HFrEF]     [  ]Combined [HFpEF & HFrEF]     [  ]Other:  -------------------------------------------------------------------  [  ]GILMER     [  ]ATN     [  ]Reneal Medullary Necrosis     [  ]Renal Cortical Necrosis     [  ]Other Pathological Lesions:    [  ]CKD 1  [  ]CKD 2  [  ]CKD 3  [  ]CKD 4  [  ]CKD 5  [  ]Other  -------------------------------------------------------------------  [  ]Other/Unspecified:    --------------------------------------------------------------------    Abdominal Nutritional Status  [  ]Malnutrition: See Nutrition Note  [  ]Cachexia  [  ]Other:   [  ]Supplement Ordered:  [  ]Morbid Obesity (BMI >=40]

## 2018-06-24 NOTE — PROGRESS NOTE ADULT - ATTENDING COMMENTS
d/w pt's  at bedside s/p last HD session yesterday, tolerated 1LUF, she doesn't want any further dialysis, comfort measures only. Will sign off, thank you for allowing us to participate in the care of this patient.

## 2018-06-24 NOTE — PROGRESS NOTE ADULT - PROBLEM SELECTOR PLAN 1
- ESRD TTS schedule for now with right chest wall tunnel HD catheter.  As per the palliative care after this session   - volume status acceptable   - electrolytes noted - yesterday - 2 K bath performed    - taking under consideration the palliative care after this session of HD - the renal service will sign off the case   - please contact us if there is a change of the plan

## 2018-06-24 NOTE — PROGRESS NOTE ADULT - PROBLEM SELECTOR PLAN 6
ARF likely 2/2 to ischemic ATN with hypoperfusion in setting of shock this admission. Now on HD through R sided Permacath.   - last HD yesterday

## 2018-06-24 NOTE — PROGRESS NOTE ADULT - PROBLEM SELECTOR PLAN 1
Resolved. Meeting SIRS criteria with leukocytosis and fever on 6/15. Source likely unsteagable/incurable sacral ulcer with e/o osteo. Was on Vanc and Zosyn, however, in this situation where patient has non-surgical sacral wound and poor prognosis, little utility in continuing abx at this time as patient likely to develop resistance  - antibiotics discontinued as patient has a chronic incurable infection. Discussed at family meeting.   - decision for no escalation of antibiotics, no escalation of care, no MEWS    #Sacral decubitus ulcer- unstageable s/p debridement with plastic surgery. Cultures growing multiple organisms including Pseudomonas and E faecium. Pelvic MRI showing acute osteomyelitis and loculated collection abutting the bladder. Patient is not a surgical candidate based on his comorbidities and inability lay in prone positioning post-op ([id number]).   - discontinued abx as above   - pain control   - frequent turning q2h for pressure offloading, air fluid mattress  - wound care with dry dressings with Allevyn over top  - soilage control with rectal tube, benefits outweigh risks at this point   - Nutrition support    #UTI- Patient with urinary retention requiring chronic indwelling Jose catheter. Urine cultures growing Sherrie Dubliniensis sensitive to Fluconazole.  - received last dose of Fluconazole

## 2018-06-24 NOTE — PROGRESS NOTE ADULT - SUBJECTIVE AND OBJECTIVE BOX
Seen in the morning, in his room, on  ventilator through tracheostomy, does not follow commands, sleeping. Yesterday HD was done - 1 liter off for 3 hours Bp stable     The renal service is for the need of HD     Patient seen and examined at bedside.        acetaminophen    Suspension. 650 milliGRAM(s) every 8 hours  acetylcysteine 20% Inhalation 4 milliLiter(s) two times a day  aspirin  chewable 81 milliGRAM(s) daily  collagenase Ointment 1 Application(s) daily  escitalopram 5 milliGRAM(s) daily  hydrocortisone 10 milliGRAM(s) every 12 hours  HYDROmorphone  Injectable 0.25 milliGRAM(s) every 4 hours  HYDROmorphone  Injectable 0.25 milliGRAM(s) every 1 hour PRN  insulin glargine Injectable (LANTUS) 7 Unit(s) at bedtime  insulin regular  human corrective regimen sliding scale   <User Schedule>  insulin regular  human recombinant 17 Unit(s) every 6 hours  levETIRAcetam  Solution 500 milliGRAM(s) every 24 hours  metoclopramide 5 milliGRAM(s) every 12 hours  midodrine 10 milliGRAM(s) every 8 hours  modafinil 100 milliGRAM(s) daily  scopolamine   Patch 1 Patch every 3 days      Allergies    No Known Allergies    Intolerances        T(C): , Max: 38.3 (06-24-18 @ 15:58)  T(F): , Max: 101 (06-24-18 @ 15:58)  HR: 99 (06-24-18 @ 15:58)  BP: 131/68 (06-24-18 @ 15:58)  BP(mean): --  RR: 22 (06-24-18 @ 15:58)  SpO2: 100% (06-24-18 @ 15:58)  Wt(kg): --    06-23 @ 07:01  -  06-24 @ 07:00  --------------------------------------------------------  IN:    Enteral Tube Flush: 380 mL    Jevity: 743 mL  Total IN: 1123 mL    OUT:  Total OUT: 0 mL    Total NET: 1123 mL        Physical exam';   Sleeping, does not follow commands  No JVD   Normal air entry into the lungs, no wheezing, no crackles, no rales   RRR, normal s1/s2, no murmurs, rubs or gallops   Abdomen - soft, not tender, not distended   Extremities: no edema         LABS:                        8.2    19.4  )-----------( 295      ( 23 Jun 2018 07:06 )             28.9     06-23    143  |  98  |  72<H>  ----------------------------<  88  5.2   |  28  |  3.83<H>    Ca    9.0      23 Jun 2018 07:06  Phos  6.9     06-23  Mg     2.3     06-23                  RADIOLOGY & ADDITIONAL STUDIES:                RADIOLOGY & ADDITIONAL STUDIES:

## 2018-06-24 NOTE — PROGRESS NOTE ADULT - ASSESSMENT
Patient is a 63 year old male with a history of HFrEF 10-15% due to ischemic cardiomyopathy, s/p AICD, ?atrial fibrillation, hypertension, diabetes mellitus type 2, gout, and CKD for whom nephrology was called for GILMER from ATN requiring hemodialysis.   Currently dialysis dependent without recovery of renal function. Likely ESRD now as it has been over 90 days.   Now after the HD will be comfort care. The patient dialyzed on 6/23 - 1 liter removed, according to the team from now comfort care

## 2018-06-24 NOTE — PROGRESS NOTE ADULT - PROBLEM SELECTOR PLAN 2
Acute respiratory failure in setting of septic and cardiogenic shock, with trach placed on 4/5/18 for persistent, now chronic respiratory failure.   - Dilaudid 0.25mg IV Q4H and 0.25mg IV Q1H PRN for tachypnea for comfort  - scopolamine PRN secretions

## 2018-06-25 LAB
GLUCOSE BLDC GLUCOMTR-MCNC: 128 MG/DL — HIGH (ref 70–99)
GLUCOSE BLDC GLUCOMTR-MCNC: 141 MG/DL — HIGH (ref 70–99)
GLUCOSE BLDC GLUCOMTR-MCNC: 144 MG/DL — HIGH (ref 70–99)
GLUCOSE BLDC GLUCOMTR-MCNC: 147 MG/DL — HIGH (ref 70–99)

## 2018-06-25 PROCEDURE — 99233 SBSQ HOSP IP/OBS HIGH 50: CPT

## 2018-06-25 PROCEDURE — 99233 SBSQ HOSP IP/OBS HIGH 50: CPT | Mod: GC

## 2018-06-25 RX ORDER — MODAFINIL 200 MG/1
100 TABLET ORAL DAILY
Qty: 0 | Refills: 0 | Status: DISCONTINUED | OUTPATIENT
Start: 2018-06-25 | End: 2018-07-01

## 2018-06-25 RX ADMIN — Medication 4 MILLILITER(S): at 06:03

## 2018-06-25 RX ADMIN — HYDROMORPHONE HYDROCHLORIDE 0.25 MILLIGRAM(S): 2 INJECTION INTRAMUSCULAR; INTRAVENOUS; SUBCUTANEOUS at 13:42

## 2018-06-25 RX ADMIN — Medication 5 MILLIGRAM(S): at 18:07

## 2018-06-25 RX ADMIN — LEVETIRACETAM 500 MILLIGRAM(S): 250 TABLET, FILM COATED ORAL at 13:51

## 2018-06-25 RX ADMIN — Medication 1 APPLICATION(S): at 12:33

## 2018-06-25 RX ADMIN — Medication 81 MILLIGRAM(S): at 12:33

## 2018-06-25 RX ADMIN — MODAFINIL 100 MILLIGRAM(S): 200 TABLET ORAL at 12:33

## 2018-06-25 RX ADMIN — Medication 10 MILLIGRAM(S): at 18:07

## 2018-06-25 RX ADMIN — Medication 650 MILLIGRAM(S): at 06:00

## 2018-06-25 RX ADMIN — Medication 650 MILLIGRAM(S): at 13:42

## 2018-06-25 RX ADMIN — INSULIN HUMAN 15 UNIT(S): 100 INJECTION, SOLUTION SUBCUTANEOUS at 18:21

## 2018-06-25 RX ADMIN — MIDODRINE HYDROCHLORIDE 10 MILLIGRAM(S): 2.5 TABLET ORAL at 22:31

## 2018-06-25 RX ADMIN — HYDROMORPHONE HYDROCHLORIDE 0.25 MILLIGRAM(S): 2 INJECTION INTRAMUSCULAR; INTRAVENOUS; SUBCUTANEOUS at 22:47

## 2018-06-25 RX ADMIN — Medication 650 MILLIGRAM(S): at 14:32

## 2018-06-25 RX ADMIN — HYDROMORPHONE HYDROCHLORIDE 0.25 MILLIGRAM(S): 2 INJECTION INTRAMUSCULAR; INTRAVENOUS; SUBCUTANEOUS at 19:29

## 2018-06-25 RX ADMIN — INSULIN GLARGINE 7 UNIT(S): 100 INJECTION, SOLUTION SUBCUTANEOUS at 23:50

## 2018-06-25 RX ADMIN — MIDODRINE HYDROCHLORIDE 10 MILLIGRAM(S): 2.5 TABLET ORAL at 05:59

## 2018-06-25 RX ADMIN — HYDROMORPHONE HYDROCHLORIDE 0.25 MILLIGRAM(S): 2 INJECTION INTRAMUSCULAR; INTRAVENOUS; SUBCUTANEOUS at 14:32

## 2018-06-25 RX ADMIN — MIDODRINE HYDROCHLORIDE 10 MILLIGRAM(S): 2.5 TABLET ORAL at 13:41

## 2018-06-25 RX ADMIN — Medication 650 MILLIGRAM(S): at 22:31

## 2018-06-25 RX ADMIN — Medication 650 MILLIGRAM(S): at 05:59

## 2018-06-25 RX ADMIN — Medication 5 MILLIGRAM(S): at 05:59

## 2018-06-25 RX ADMIN — Medication 4 MILLILITER(S): at 18:08

## 2018-06-25 RX ADMIN — Medication 10 MILLIGRAM(S): at 06:00

## 2018-06-25 RX ADMIN — INSULIN HUMAN 15 UNIT(S): 100 INJECTION, SOLUTION SUBCUTANEOUS at 05:59

## 2018-06-25 RX ADMIN — INSULIN HUMAN 15 UNIT(S): 100 INJECTION, SOLUTION SUBCUTANEOUS at 23:52

## 2018-06-25 RX ADMIN — SCOPALAMINE 1 PATCH: 1 PATCH, EXTENDED RELEASE TRANSDERMAL at 12:34

## 2018-06-25 RX ADMIN — INSULIN HUMAN 15 UNIT(S): 100 INJECTION, SOLUTION SUBCUTANEOUS at 12:32

## 2018-06-25 RX ADMIN — HYDROMORPHONE HYDROCHLORIDE 0.25 MILLIGRAM(S): 2 INJECTION INTRAMUSCULAR; INTRAVENOUS; SUBCUTANEOUS at 22:32

## 2018-06-25 RX ADMIN — SCOPALAMINE 1 PATCH: 1 PATCH, EXTENDED RELEASE TRANSDERMAL at 12:33

## 2018-06-25 RX ADMIN — HYDROMORPHONE HYDROCHLORIDE 0.25 MILLIGRAM(S): 2 INJECTION INTRAMUSCULAR; INTRAVENOUS; SUBCUTANEOUS at 18:08

## 2018-06-25 RX ADMIN — ESCITALOPRAM OXALATE 5 MILLIGRAM(S): 10 TABLET, FILM COATED ORAL at 12:33

## 2018-06-25 NOTE — PROGRESS NOTE ADULT - SUBJECTIVE AND OBJECTIVE BOX
Palliative Medicine  reconsulted on this 63 year old male who has been hospitalized over 3 months.  Pt was admitted with cellulitis in March 2018.  His course was complicated by septic shock, cardiogenic shock, renal failure, respiratory failure, multiple infections and cva with seizure activity.  Pt remains mechanically vented, hemodialysis dependant, completely disabled requiring complete care for ADLs.  He is fed enterally through a PEG tube. Pt has developed significant decubitus ulcer which is concerning for osteomyelitis (work up in progress). As pt is fecally incontinent, he continues to contaminate the wound with fecal matter.    Interval history:  stable.   last HD was Saturday June 23  remains with leukocytosis. Stable unstageable large sacral wound with tunnelling and foul odor.   Antibiotics have completed.  Pt noted to be more awake over the weekend, and denying pain, thus did not receive the scheduled pain medications.     Past 24 hour-  pt denying pain medication and did not receive his last doses for that reason  Pt more alert than in recent days    PAIN (0-10):  0/10  DYSPNEA (Y/N):  no  NAUSEA/VOMITING (Y/N):  no  SECRETIONS (Y/N):  no  AGITATION (Y/N): yes mildly    OTHER REVIEW OF SYSTEMS:  UNABLE TO OBTAIN  due to:  altered mental status    ICU Vital Signs Last 24 Hrs  T(C): 37.6 (25 Jun 2018 15:57), Max: 37.6 (25 Jun 2018 15:57)  T(F): 99.6 (25 Jun 2018 15:57), Max: 99.6 (25 Jun 2018 15:57)  HR: 83 (25 Jun 2018 15:57) (83 - 98)  BP: 93/66 (25 Jun 2018 15:57) (83/62 - 94/61)  BP(mean): --  ABP: --  ABP(mean): --  RR: 20 (25 Jun 2018 15:57) (13 - 22)  SpO2: 100% (25 Jun 2018 15:57) (97% - 100%)      GENERAL:  , chronically ill appearing. thin and frail, not distressed  HEENT:  NC/AT, normocephalic, sclera anicteric, temporal wasting, eyes open, mouthing words  NECK:    supple, no JVD  CVS:     reg, + left chest wall PPM, R sided perm cath  RESP:  vented, course, sonorous breath sounds, on CPAP trial today  GI:    + PEG tube, flat, nontender, +BS  :    aneuric, on HD, no barnes  Rectal:  rectal tube in place, leaking liquid stool  MUSC:   no movement to Bilat LE, moving bilat UE against gravity, intermetacarpal  wasting  NEURO:   lethargic   PSYCH:    maritza       SKIN:    + sacral decub. large, deep and tunnelling present, no bleeding at site, the wound bed appears dark gray- not vascularized, specific observations per wound care nurse, + foul odor  LYMPH:      neg  	                   OPIATE NAÏVE (Y/N):  yes  ALLERGIES: No Known Allergies      MEDICATIONS  (STANDING):  acetaminophen    Suspension. 650 milliGRAM(s) Oral every 8 hours  acetylcysteine 20% Inhalation 4 milliLiter(s) Inhalation two times a day  aspirin  chewable 81 milliGRAM(s) Oral daily  collagenase Ointment 1 Application(s) Topical daily  escitalopram 5 milliGRAM(s) Oral daily  hydrocortisone 10 milliGRAM(s) Oral every 12 hours  HYDROmorphone  Injectable 0.25 milliGRAM(s) IV Push every 4 hours  insulin glargine Injectable (LANTUS) 7 Unit(s) SubCutaneous at bedtime  insulin regular  human corrective regimen sliding scale   SubCutaneous <User Schedule>  insulin regular  human recombinant 15 Unit(s) SubCutaneous every 6 hours  levETIRAcetam  Solution 500 milliGRAM(s) Oral every 24 hours  metoclopramide 5 milliGRAM(s) Oral every 12 hours  midodrine 10 milliGRAM(s) Oral every 8 hours  modafinil 100 milliGRAM(s) Oral daily  scopolamine   Patch 1 Patch Transdermal every 3 days    MEDICATIONS  (PRN):  HYDROmorphone  Injectable 0.25 milliGRAM(s) IV Push every 1 hour PRN Breakthrough pain      	         LABS REVIEWED  none new                  IMAGING REVIEWED:  n/a  	  ADVANCED DIRECTIVES:    none    Decision Maker:  pts HCP is Cristal [place]   [phone]  Goals of care: comfort focused    PSYCHOSOCIAL-SPIRITUAL ASSESSMENT:        Pt is followed by his personal friend who is a . Pt is of a [employer]ah[employer] persuasion         GOALS OF CARE DISCUSSION:       Palliative care info/counseling provided	           Family meeting-- today-- see below for details       Advanced Directives; DNR as of May 2018      	        REFERRALS:        Palliative Med        Unit SW/Case Mgmt              Patient/Family Support-- for support of pts yana sonOmar age 5 yo       Ethics

## 2018-06-25 NOTE — PROGRESS NOTE ADULT - ASSESSMENT
63M PMH HFrEF 10-15% (ischemic), MI, s/p AICD vs PPM, possible Afib, HTN, DM2 on insulin, possible CKD, and gout, who presented with a chief complaint of generalized weakness (3/10/2018) s/p septic shock likely 2/2 LE cellulitis complicated by ATN requiring dialysis. Course complicated by AMS likely from brainstem infarct 2/2 LV thrombus vs toxic metabolic encephalopathy. Further complicated by development of candidemia and sepsis 2/2 klebsiella bacteremia s/p fluconazole and CTX, now resolved. s/p completed course of Zosyn for aspiration PNA.  Goals of care discussion for which patient made DNR, but with continued escalation of care. Continues to undergo management for respiratory failure with weaning as tolerated with CPAP, trach collar trials. With complications of hypotension, tolerating HD w/o albumin. Now with unsteageable incurable sacral wound with decision for comfort measures.

## 2018-06-25 NOTE — PROGRESS NOTE ADULT - PROBLEM SELECTOR PLAN 1
Pain under better control and pt is declining pain medication at times  continue  hydromorphone 0.25mg iv q4h ATC for pain control in light of "comfort focused" goals as delineated by pts family. do not hold hydromorphone for hypotension (or hold for SBP < 75 or 80)  Can also add additional hydromorphone 0.25mg iv q1h prn for breakthru pain alex at time of bathing, wound care and personal care.

## 2018-06-25 NOTE — PROGRESS NOTE ADULT - PROBLEM SELECTOR PLAN 2
Stage IV with evidence of OM on imaging. No role for surgical debridement   antibiotics insufficient to clear infection, as we are not able to control the source and heal the wound  This is likely to be the cause of pts death due to the resultant sepsis.  Continue wound care and analgesia

## 2018-06-25 NOTE — PROGRESS NOTE ADULT - SUBJECTIVE AND OBJECTIVE BOX
PGY1 PROGRESS NOTE:    CC: septic Shock due to bilateral cellulitis - course complicated by renal failure (on hemodialysis), stroke, and respiratory failure. (01 Jun 2018 14:51)    OVERNIGHT EVENTS: ELSIE.     SUBJECTIVE / INTERVAL HPI: Appears comfortable on Dilaudid gtt.     ROS: negative unless otherwise specified    VITAL SIGNS:  T(F): 97.8 (25 Jun 2018 06:12), Max: 101 (24 Jun 2018 15:58)  HR: 98 (25 Jun 2018 06:12) (90 - 99)  BP: 94/61 (25 Jun 2018 06:12) (81/54 - 131/68)  RR: 20 (25 Jun 2018 06:12) (13 - 22)  SpO2: 100% (25 Jun 2018 06:12) (98% - 100%)    PHYSICAL EXAM:  Constitutional: thin, cachectic, chronically ill appearing, NAD   HEENT: temporal wasting, PERRLA, EOMI, dry MM  Neck: No JVD, tracheostomy with minimal mucoid discharge  Respiratory: transmitted upper resp sounds, course breath sounds b/l, rhonchi b/l  Cardiovascular: AICD in place, normal S1S2, no MRG  Gastrointestinal: non-distended, soft, +BS, +PEG tube, +rectal tube with green-brown loose stools  Extremities: hyperpigmented skin on anterior shin RLE, trace dependent edema, hands in braces to prevent contracture  Musculoskeletal: increased rigidity in UE, atrophied musculature in b/l LE  Vascular: 1+ peripheral pulses  Neurological: intermittently able to follow commands, moving all extremities spontaneously, no focal deficits  Skin: Large [id number] unstageable sacral ulcer with exposed necrotic tissue and muscles and tracking underneath in all directions, foul smelling, black eschar overlying tissue, non-bleeding, no purulent material    MEDICATIONS:  MEDICATIONS  (STANDING):  acetaminophen    Suspension. 650 milliGRAM(s) Oral every 8 hours  acetylcysteine 20% Inhalation 4 milliLiter(s) Inhalation two times a day  aspirin  chewable 81 milliGRAM(s) Oral daily  collagenase Ointment 1 Application(s) Topical daily  escitalopram 5 milliGRAM(s) Oral daily  hydrocortisone 10 milliGRAM(s) Oral every 12 hours  HYDROmorphone  Injectable 0.25 milliGRAM(s) IV Push every 4 hours  insulin glargine Injectable (LANTUS) 7 Unit(s) SubCutaneous at bedtime  insulin regular  human corrective regimen sliding scale   SubCutaneous <User Schedule>  insulin regular  human recombinant 15 Unit(s) SubCutaneous every 6 hours  levETIRAcetam  Solution 500 milliGRAM(s) Oral every 24 hours  metoclopramide 5 milliGRAM(s) Oral every 12 hours  midodrine 10 milliGRAM(s) Oral every 8 hours  modafinil 100 milliGRAM(s) Oral daily  scopolamine   Patch 1 Patch Transdermal every 3 days      ALLERGIES:  No Known Allergies      LABS:       No Labs        RADIOLOGY & ADDITIONAL TESTS: Reviewed. PGY1 PROGRESS NOTE:    CC: septic Shock due to bilateral cellulitis - course complicated by renal failure (on hemodialysis), stroke, and respiratory failure. (01 Jun 2018 14:51)    OVERNIGHT EVENTS: ELSIE.     SUBJECTIVE / INTERVAL HPI:  More awake this morning. Responding appropriately to questions via head nods. Following commands. Patient not in any pain and is comfortable.    ROS: negative unless otherwise specified    VITAL SIGNS:  T(F): 97.8 (25 Jun 2018 06:12), Max: 101 (24 Jun 2018 15:58)  HR: 98 (25 Jun 2018 06:12) (90 - 99)  BP: 94/61 (25 Jun 2018 06:12) (81/54 - 131/68)  RR: 20 (25 Jun 2018 06:12) (13 - 22)  SpO2: 100% (25 Jun 2018 06:12) (98% - 100%)    PHYSICAL EXAM:  Constitutional: thin, cachectic, chronically ill appearing, NAD   HEENT: temporal wasting, PERRLA, EOMI, dry MM  Neck: No JVD, tracheostomy with minimal mucoid discharge  Respiratory: transmitted upper resp sounds, course breath sounds b/l, rhonchi b/l  Cardiovascular: AICD in place, normal S1S2, no MRG  Gastrointestinal: non-distended, soft, +BS, +PEG tube, +rectal tube with green-brown loose stools  Extremities: hyperpigmented skin on anterior shin RLE, trace dependent edema, hands in braces to prevent contracture  Musculoskeletal: increased rigidity in UE, atrophied musculature in b/l LE  Vascular: 1+ peripheral pulses  Neurological: non-verbal, responding appropriately to questions via head nod, following commands, moving all extremities, no focal deficits  Skin: Large [id number] unstageable sacral ulcer with exposed necrotic tissue and muscles and tracking underneath in all directions, foul smelling, black eschar overlying tissue, non-bleeding, no purulent material    MEDICATIONS:  MEDICATIONS  (STANDING):  acetaminophen    Suspension. 650 milliGRAM(s) Oral every 8 hours  acetylcysteine 20% Inhalation 4 milliLiter(s) Inhalation two times a day  aspirin  chewable 81 milliGRAM(s) Oral daily  collagenase Ointment 1 Application(s) Topical daily  escitalopram 5 milliGRAM(s) Oral daily  hydrocortisone 10 milliGRAM(s) Oral every 12 hours  HYDROmorphone  Injectable 0.25 milliGRAM(s) IV Push every 4 hours  insulin glargine Injectable (LANTUS) 7 Unit(s) SubCutaneous at bedtime  insulin regular  human corrective regimen sliding scale   SubCutaneous <User Schedule>  insulin regular  human recombinant 15 Unit(s) SubCutaneous every 6 hours  levETIRAcetam  Solution 500 milliGRAM(s) Oral every 24 hours  metoclopramide 5 milliGRAM(s) Oral every 12 hours  midodrine 10 milliGRAM(s) Oral every 8 hours  modafinil 100 milliGRAM(s) Oral daily  scopolamine   Patch 1 Patch Transdermal every 3 days      ALLERGIES:  No Known Allergies      LABS:       No Labs        RADIOLOGY & ADDITIONAL TESTS: Reviewed. PGY1 PROGRESS NOTE:    CC: septic Shock due to bilateral cellulitis - course complicated by renal failure (on hemodialysis), stroke, and respiratory failure. (01 Jun 2018 14:51)    OVERNIGHT EVENTS: ELSIE.     SUBJECTIVE / INTERVAL HPI:  Awake but not as responsive as day prior. Intermittently responding to questions via head nods. Not following commands. Patient not in any pain and is comfortable.     ROS: negative unless otherwise specified    VITAL SIGNS:  T(F): 97.8 (25 Jun 2018 06:12), Max: 101 (24 Jun 2018 15:58)  HR: 98 (25 Jun 2018 06:12) (90 - 99)  BP: 94/61 (25 Jun 2018 06:12) (81/54 - 131/68)  RR: 20 (25 Jun 2018 06:12) (13 - 22)  SpO2: 100% (25 Jun 2018 06:12) (98% - 100%)    PHYSICAL EXAM:  Constitutional: thin, cachectic, chronically ill appearing, NAD   HEENT: temporal wasting, PERRLA, EOMI, dry MM  Neck: No JVD, tracheostomy with minimal mucoid discharge  Respiratory: transmitted upper resp sounds, course breath sounds b/l, rhonchi b/l  Cardiovascular: AICD in place, normal S1S2, no MRG  Gastrointestinal: non-distended, soft, +BS, +PEG tube, +rectal tube with green-brown watery stool  Extremities: hyperpigmented skin on anterior shin RLE, trace dependent edema, hands in braces to prevent contracture  Musculoskeletal: increased rigidity in UE, atrophied musculature in b/l LE  Vascular: 1+ peripheral pulses  Neurological: non-verbal, responding intermittently via head nod, not following commands, moving all extremities, no focal deficits  Skin: Large [id number] unstageable sacral ulcer with exposed necrotic tissue and muscles and tracking underneath in all directions, foul smelling, black eschar overlying tissue, non-bleeding, no purulent material    MEDICATIONS:  MEDICATIONS  (STANDING):  acetaminophen    Suspension. 650 milliGRAM(s) Oral every 8 hours  acetylcysteine 20% Inhalation 4 milliLiter(s) Inhalation two times a day  aspirin  chewable 81 milliGRAM(s) Oral daily  collagenase Ointment 1 Application(s) Topical daily  escitalopram 5 milliGRAM(s) Oral daily  hydrocortisone 10 milliGRAM(s) Oral every 12 hours  HYDROmorphone  Injectable 0.25 milliGRAM(s) IV Push every 4 hours  insulin glargine Injectable (LANTUS) 7 Unit(s) SubCutaneous at bedtime  insulin regular  human corrective regimen sliding scale   SubCutaneous <User Schedule>  insulin regular  human recombinant 15 Unit(s) SubCutaneous every 6 hours  levETIRAcetam  Solution 500 milliGRAM(s) Oral every 24 hours  metoclopramide 5 milliGRAM(s) Oral every 12 hours  midodrine 10 milliGRAM(s) Oral every 8 hours  modafinil 100 milliGRAM(s) Oral daily  scopolamine   Patch 1 Patch Transdermal every 3 days      ALLERGIES:  No Known Allergies      LABS:       No Labs        RADIOLOGY & ADDITIONAL TESTS: Reviewed.

## 2018-06-25 NOTE — PROGRESS NOTE ADULT - PROBLEM SELECTOR PLAN 7
use scopalamine patch for respiratory secretions (needs 18 hours for onset of action- so apply Friday)  can consider lorazepam 2mg iv q6h prn for agitation, but I would anticipate that pt will become increasingly lethargic as he goes into renal failure and accumulates toxins with clearance of same  I spoke with pts Tha Cristal in pts room about CPAP trial and possibly moving pt to an inpt hospice unit and she was open to same.  Cristal lives in [facility] and would be open to facilities in that area.

## 2018-06-25 NOTE — PROGRESS NOTE ADULT - ASSESSMENT
63M PMH HFrEF 10-15% (ischemic), MI, s/p AICD vs PPM, possible Afib, HTN, DM2 on insulin, possible CKD, and gout, who presented with a chief complaint of generalized weakness (3/10/2018) s/p septic shock likely 2/2 LE cellulitis complicated by ATN requiring dialysis. Course complicated by AMS likely from brainstem infarct 2/2 LV thrombus vs toxic metabolic encephalopathy. Further complicated by development of candidemia and sepsis 2/2 klebsiella bacteremia s/p fluconazole and CTX, now resolved. s/p completed course of Zosyn for aspiration PNA.  Goals of care discussion for which patient made DNR, but with continued escalation of care. Continues to undergo management for respiratory failure with weaning as tolerated with CPAP, trach collar trials. With complications of hypotension, tolerating HD w/o albumin. Stable and ready for SD to [facility] for continued vent weaning prior to d/c to long term care facility with HD+vent weaning.    Pt remains hospitalized.  He is vent dependent and on HD.  Pt now had OM to his sacrum with a significant decubitus ulcer that is not amenable to surgical debridement.

## 2018-06-25 NOTE — PROGRESS NOTE ADULT - PROBLEM SELECTOR PLAN 10
VTE: decision for no AC    F: No IVF  E: No repletion of lytes, last HD on Saturday  N: Jevity 1.5 goal rate 68cc/hr  Dispo: family meeting today with decision for discontinuation of abx therapy, comfort measures, no escalation of antibiotics, no MEWs, no escalation of care.   *On Hydromorphone 0.25mg IV q4hrs ATC and Hydromorphone 0.25mg IV q1hr PRN breakthrough

## 2018-06-25 NOTE — PROGRESS NOTE ADULT - PROBLEM SELECTOR PLAN 3
remains vent dependent.  Pt's HCP is not agreeable to removing ventilatory support at this time.    Pt is tolerating a CPAP trial at this time, although he feels like he is working to breathe.  Will returned to a prescribed RR for overnight rest.  If pt is able to wean from the vent, perhaps he would have more options for inpt hospice care

## 2018-06-25 NOTE — PROGRESS NOTE ADULT - PROBLEM SELECTOR PLAN 6
AICD has been deactivated   no MEWS, no escalation of care.  comfort focused care.  consider stopping medications that are not contributing to pts comfort (provigil)

## 2018-06-25 NOTE — PROGRESS NOTE ADULT - PROBLEM SELECTOR PLAN 1
Resolved. Meeting SIRS criteria with leukocytosis and fever on 6/15. Source likely unsteagable/incurable sacral ulcer with e/o osteo. Was on Vanc and Zosyn, however, in this situation where patient has non-surgical sacral wound and poor prognosis, little utility in continuing abx at this time as patient likely to develop resistance  - antibiotics discontinued as patient has a chronic incurable infection. Discussed at family meeting.   - decision for no escalation of antibiotics, no escalation of care, no MEWS    #Sacral decubitus ulcer- unstageable s/p debridement with plastic surgery. Cultures growing multiple organisms including Pseudomonas and E faecium. Pelvic MRI showing acute osteomyelitis and loculated collection abutting the bladder. Patient is not a surgical candidate based on his comorbidities and inability lay in prone positioning post-op ([id number]).   - discontinued abx as above   - pain control   - frequent turning q2h for pressure offloading, air fluid mattress  - wound care with dry dressings with Allevyn over top  - soilage control with rectal tube    #UTI- Patient with urinary retention requiring chronic indwelling Jose catheter. Urine cultures growing Sherrie Dubliniensis sensitive to Fluconazole.  - s/p Fluconazole

## 2018-06-25 NOTE — PROGRESS NOTE ADULT - PROBLEM SELECTOR PLAN 6
ARF likely 2/2 to ischemic ATN with hypoperfusion in setting of shock this admission. Now on HD through R sided Permacath.   - no longer receiving HD per family request

## 2018-06-25 NOTE — PROGRESS NOTE ADULT - PROBLEM SELECTOR PLAN 5
Stable. Likely multifactorial in setting of septic and cardiogenic shock as well as brain stem infarct during admission.   - c/w Keppra

## 2018-06-25 NOTE — PROGRESS NOTE ADULT - PROBLEM SELECTOR PLAN 4
Stable. Persistently hypotensive throughout admission, now on midodrine 15 mg TID and hydrocortisone 10mg BID for concern of adrenal insufficiency.   - c/w Midodrine 10 mg q8h and hydrocortisone 10mg BID

## 2018-06-26 LAB
GLUCOSE BLDC GLUCOMTR-MCNC: 134 MG/DL — HIGH (ref 70–99)
GLUCOSE BLDC GLUCOMTR-MCNC: 145 MG/DL — HIGH (ref 70–99)
GLUCOSE BLDC GLUCOMTR-MCNC: 194 MG/DL — HIGH (ref 70–99)
GLUCOSE BLDC GLUCOMTR-MCNC: 197 MG/DL — HIGH (ref 70–99)

## 2018-06-26 PROCEDURE — 99233 SBSQ HOSP IP/OBS HIGH 50: CPT | Mod: GC

## 2018-06-26 PROCEDURE — 99233 SBSQ HOSP IP/OBS HIGH 50: CPT

## 2018-06-26 RX ADMIN — Medication 650 MILLIGRAM(S): at 14:24

## 2018-06-26 RX ADMIN — Medication 10 MILLIGRAM(S): at 06:40

## 2018-06-26 RX ADMIN — INSULIN HUMAN 1: 100 INJECTION, SOLUTION SUBCUTANEOUS at 12:20

## 2018-06-26 RX ADMIN — LEVETIRACETAM 500 MILLIGRAM(S): 250 TABLET, FILM COATED ORAL at 14:24

## 2018-06-26 RX ADMIN — Medication 650 MILLIGRAM(S): at 22:35

## 2018-06-26 RX ADMIN — MIDODRINE HYDROCHLORIDE 10 MILLIGRAM(S): 2.5 TABLET ORAL at 06:40

## 2018-06-26 RX ADMIN — Medication 4 MILLILITER(S): at 22:17

## 2018-06-26 RX ADMIN — HYDROMORPHONE HYDROCHLORIDE 0.25 MILLIGRAM(S): 2 INJECTION INTRAMUSCULAR; INTRAVENOUS; SUBCUTANEOUS at 17:38

## 2018-06-26 RX ADMIN — HYDROMORPHONE HYDROCHLORIDE 0.25 MILLIGRAM(S): 2 INJECTION INTRAMUSCULAR; INTRAVENOUS; SUBCUTANEOUS at 22:18

## 2018-06-26 RX ADMIN — INSULIN HUMAN 15 UNIT(S): 100 INJECTION, SOLUTION SUBCUTANEOUS at 18:30

## 2018-06-26 RX ADMIN — Medication 1 APPLICATION(S): at 11:29

## 2018-06-26 RX ADMIN — MODAFINIL 100 MILLIGRAM(S): 200 TABLET ORAL at 11:29

## 2018-06-26 RX ADMIN — Medication 650 MILLIGRAM(S): at 07:38

## 2018-06-26 RX ADMIN — Medication 4 MILLILITER(S): at 10:18

## 2018-06-26 RX ADMIN — Medication 5 MILLIGRAM(S): at 06:40

## 2018-06-26 RX ADMIN — Medication 650 MILLIGRAM(S): at 06:38

## 2018-06-26 RX ADMIN — INSULIN HUMAN 1: 100 INJECTION, SOLUTION SUBCUTANEOUS at 18:30

## 2018-06-26 RX ADMIN — HYDROMORPHONE HYDROCHLORIDE 0.25 MILLIGRAM(S): 2 INJECTION INTRAMUSCULAR; INTRAVENOUS; SUBCUTANEOUS at 10:55

## 2018-06-26 RX ADMIN — INSULIN HUMAN 15 UNIT(S): 100 INJECTION, SOLUTION SUBCUTANEOUS at 06:49

## 2018-06-26 RX ADMIN — HYDROMORPHONE HYDROCHLORIDE 0.25 MILLIGRAM(S): 2 INJECTION INTRAMUSCULAR; INTRAVENOUS; SUBCUTANEOUS at 15:14

## 2018-06-26 RX ADMIN — HYDROMORPHONE HYDROCHLORIDE 0.25 MILLIGRAM(S): 2 INJECTION INTRAMUSCULAR; INTRAVENOUS; SUBCUTANEOUS at 10:17

## 2018-06-26 RX ADMIN — HYDROMORPHONE HYDROCHLORIDE 0.25 MILLIGRAM(S): 2 INJECTION INTRAMUSCULAR; INTRAVENOUS; SUBCUTANEOUS at 18:31

## 2018-06-26 RX ADMIN — Medication 5 MILLIGRAM(S): at 18:32

## 2018-06-26 RX ADMIN — HYDROMORPHONE HYDROCHLORIDE 0.25 MILLIGRAM(S): 2 INJECTION INTRAMUSCULAR; INTRAVENOUS; SUBCUTANEOUS at 06:54

## 2018-06-26 RX ADMIN — HYDROMORPHONE HYDROCHLORIDE 0.25 MILLIGRAM(S): 2 INJECTION INTRAMUSCULAR; INTRAVENOUS; SUBCUTANEOUS at 19:46

## 2018-06-26 RX ADMIN — Medication 10 MILLIGRAM(S): at 18:33

## 2018-06-26 RX ADMIN — Medication 650 MILLIGRAM(S): at 22:36

## 2018-06-26 RX ADMIN — HYDROMORPHONE HYDROCHLORIDE 0.25 MILLIGRAM(S): 2 INJECTION INTRAMUSCULAR; INTRAVENOUS; SUBCUTANEOUS at 06:39

## 2018-06-26 RX ADMIN — Medication 650 MILLIGRAM(S): at 16:45

## 2018-06-26 RX ADMIN — INSULIN HUMAN 15 UNIT(S): 100 INJECTION, SOLUTION SUBCUTANEOUS at 12:20

## 2018-06-26 RX ADMIN — HYDROMORPHONE HYDROCHLORIDE 0.25 MILLIGRAM(S): 2 INJECTION INTRAMUSCULAR; INTRAVENOUS; SUBCUTANEOUS at 14:24

## 2018-06-26 RX ADMIN — INSULIN GLARGINE 7 UNIT(S): 100 INJECTION, SOLUTION SUBCUTANEOUS at 22:17

## 2018-06-26 RX ADMIN — MIDODRINE HYDROCHLORIDE 10 MILLIGRAM(S): 2.5 TABLET ORAL at 22:18

## 2018-06-26 RX ADMIN — MIDODRINE HYDROCHLORIDE 10 MILLIGRAM(S): 2.5 TABLET ORAL at 14:24

## 2018-06-26 RX ADMIN — HYDROMORPHONE HYDROCHLORIDE 0.25 MILLIGRAM(S): 2 INJECTION INTRAMUSCULAR; INTRAVENOUS; SUBCUTANEOUS at 22:17

## 2018-06-26 RX ADMIN — HYDROMORPHONE HYDROCHLORIDE 0.25 MILLIGRAM(S): 2 INJECTION INTRAMUSCULAR; INTRAVENOUS; SUBCUTANEOUS at 16:39

## 2018-06-26 NOTE — PROGRESS NOTE ADULT - PROBLEM SELECTOR PLAN 7
continue scopolamine patch for respiratory secretions   can consider lorazepam 2mg iv q6h prn for agitation, but I would anticipate that pt will become increasingly lethargic as he goes into renal failure and accumulates toxins with clearance of same  I spoke with pts Dgt Cristal in pts room on Monday June 25 about CPAP trial and possibly moving pt to an inpt hospice unit and she was open to same.  Cristal lives in [facility] and would be open to facilities in that area.  IF pts dgt wishes to continue vent support, may need to consider transfer to a vent facility with a hospice support at the facility (rather than [facility]- which will be difficult to access while pt is on feeds and the vent)

## 2018-06-26 NOTE — PROGRESS NOTE ADULT - PROBLEM SELECTOR PLAN 5
Support provided to patient and family. Patient to have access to supportive services during rest of hospital stay as the patient/family deemed necessary ie. Massage therapy,  Patient and family supportive services, etc.

## 2018-06-26 NOTE — PROGRESS NOTE ADULT - SUBJECTIVE AND OBJECTIVE BOX
PGY1 PROGRESS NOTE:    CC: septic Shock due to bilateral cellulitis - course complicated by renal failure (on hemodialysis), stroke, and respiratory failure. (01 Jun 2018 14:51)    OVERNIGHT EVENTS: ELSIE.     SUBJECTIVE / INTERVAL HPI:  Awake but not as responsive as day prior. Intermittently responding to questions via head nods. Not following commands. Patient not in any pain and is comfortable.     ROS: negative unless otherwise specified    VITAL SIGNS:  T(F): 97.8 (25 Jun 2018 06:12), Max: 101 (24 Jun 2018 15:58)  HR: 98 (25 Jun 2018 06:12) (90 - 99)  BP: 94/61 (25 Jun 2018 06:12) (81/54 - 131/68)  RR: 20 (25 Jun 2018 06:12) (13 - 22)  SpO2: 100% (25 Jun 2018 06:12) (98% - 100%)    PHYSICAL EXAM:  Constitutional: thin, cachectic, chronically ill appearing, NAD   HEENT: temporal wasting, PERRLA, EOMI, dry MM  Neck: No JVD, tracheostomy with minimal mucoid discharge  Respiratory: transmitted upper resp sounds, course breath sounds b/l, rhonchi b/l  Cardiovascular: AICD in place, normal S1S2, no MRG  Gastrointestinal: non-distended, soft, +BS, +PEG tube, +rectal tube with green-brown watery stool  Extremities: hyperpigmented skin on anterior shin RLE, trace dependent edema, hands in braces to prevent contracture  Musculoskeletal: increased rigidity in UE, atrophied musculature in b/l LE  Vascular: 1+ peripheral pulses  Neurological: non-verbal, responding intermittently via head nod, not following commands, moving all extremities, no focal deficits  Skin: Large [id number] unstageable sacral ulcer with exposed necrotic tissue and muscles and tracking underneath in all directions, foul smelling, black eschar overlying tissue, non-bleeding, no purulent material    MEDICATIONS:  MEDICATIONS  (STANDING):  acetaminophen    Suspension. 650 milliGRAM(s) Oral every 8 hours  acetylcysteine 20% Inhalation 4 milliLiter(s) Inhalation two times a day  aspirin  chewable 81 milliGRAM(s) Oral daily  collagenase Ointment 1 Application(s) Topical daily  escitalopram 5 milliGRAM(s) Oral daily  hydrocortisone 10 milliGRAM(s) Oral every 12 hours  HYDROmorphone  Injectable 0.25 milliGRAM(s) IV Push every 4 hours  insulin glargine Injectable (LANTUS) 7 Unit(s) SubCutaneous at bedtime  insulin regular  human corrective regimen sliding scale   SubCutaneous <User Schedule>  insulin regular  human recombinant 15 Unit(s) SubCutaneous every 6 hours  levETIRAcetam  Solution 500 milliGRAM(s) Oral every 24 hours  metoclopramide 5 milliGRAM(s) Oral every 12 hours  midodrine 10 milliGRAM(s) Oral every 8 hours  modafinil 100 milliGRAM(s) Oral daily  scopolamine   Patch 1 Patch Transdermal every 3 days      ALLERGIES:  No Known Allergies      LABS:       No Labs        RADIOLOGY & ADDITIONAL TESTS: Reviewed.

## 2018-06-26 NOTE — PROGRESS NOTE ADULT - PROBLEM SELECTOR PLAN 2
Acute respiratory failure in setting of septic and cardiogenic shock, with trach placed on 4/5/18 for persistent, now chronic respiratory failure.   - Dilaudid 0.25mg IV PRN for tachypnea for comfort  - scopolamine PRN secretions  - CPAP trials yesterday and today successful.   - trach collar trial today

## 2018-06-26 NOTE — PROGRESS NOTE ADULT - PROBLEM SELECTOR PLAN 1
Resolved. Meeting SIRS criteria with leukocytosis and fever on 6/15. Source likely unsteagable/incurable sacral ulcer with e/o osteo. Was on Vanc and Zosyn, however, in this situation where patient has non-surgical sacral wound and poor prognosis, little utility in continuing abx at this time as patient likely to develop resistance  - antibiotics discontinued as patient has a chronic incurable infection. Discussed at family meeting.   - decision for no escalation of antibiotics, no escalation of care, no MEWS    #Sacral decubitus ulcer- unstageable s/p debridement with plastic surgery. Cultures growing multiple organisms including Pseudomonas and E faecium. Pelvic MRI showing acute osteomyelitis and loculated collection abutting the bladder. Patient is not a surgical candidate based on his comorbidities and inability lay in prone positioning post-op ([id number]).   - discontinued abx as above   - pain control   - frequent turning q2h for pressure offloading, air fluid mattress  - wound care with dry dressings with Allevyn over top  - soilage control with rectal tube    #UTI- Patient with urinary retention requiring chronic indwelling Jose catheter. Urine cultures growing Sherrie Dubliniensis sensitive to Fluconazole.  - s/p Fluconazole

## 2018-06-26 NOTE — PROGRESS NOTE ADULT - PROBLEM SELECTOR PLAN 4
HD dependant.  Last HD session was Saturday June 23  Team will contact renal to allow for use of pts permcath as IV access, in light of paucity of Iv access (PICC has been d/cd and pt has 1 PIV)

## 2018-06-26 NOTE — PROGRESS NOTE ADULT - SUBJECTIVE AND OBJECTIVE BOX
Palliative Medicine  reconsulted on this 63 year old male who has been hospitalized over 3 months.  Pt was admitted with cellulitis in March 2018.  His course was complicated by septic shock, cardiogenic shock, renal failure, respiratory failure, multiple infections and cva with seizure activity.  Pt remains mechanically vented, hemodialysis dependant, completely disabled requiring complete care for ADLs.  He is fed enterally through a PEG tube. Pt has developed significant decubitus ulcer which is concerning for osteomyelitis (work up in progress). As pt is fecally incontinent, he continues to contaminate the wound with fecal matter.    Interval history:  stable.   last HD was Saturday June 23  remains with leukocytosis. Stable unstageable large sacral wound with tunnelling and foul odor.   Antibiotics have completed.  Pt noted to be more awake over the weekend, and denying pain, thus did not receive the scheduled pain medications.     Past 24 hours-  Pt lethargic today.  In a drenching sweat, gown is wet although skin is not diaphoretic. Skin warm to touch.  No signs of pain or discomfort.  Pt recieved most of his hydromorphone doses in the last 24 hours.  PICC line has been removed.  HD catheter remains in place.       PAIN (0-10):  0/10  DYSPNEA (Y/N):  no  NAUSEA/VOMITING (Y/N):  no  SECRETIONS (Y/N):  no  AGITATION (Y/N): yes mildly    OTHER REVIEW OF SYSTEMS:  UNABLE TO OBTAIN  due to:  altered mental status    ICU Vital Signs Last 24 Hrs  ICU Vital Signs Last 24 Hrs  T(C): 37.1 (26 Jun 2018 09:02), Max: 37.6 (25 Jun 2018 15:57)  T(F): 98.7 (26 Jun 2018 09:02), Max: 99.6 (25 Jun 2018 15:57)  HR: 78 (26 Jun 2018 09:39) (78 - 98)  BP: 92/63 (26 Jun 2018 09:02) (92/63 - 98/73)  BP(mean): --  ABP: --  ABP(mean): --  RR: 18 (26 Jun 2018 09:02) (18 - 20)  SpO2: 100% (26 Jun 2018 09:39) (97% - 100%)        GENERAL:  , chronically ill appearing. thin and frail, not distressed  HEENT:  NC/AT, normocephalic, sclera anicteric, temporal wasting  NECK:    supple, no JVD  CVS:     reg, + left chest wall PPM, R sided perm cath  RESP:  vented, course, sonorous breath sounds, on AC  GI:    + PEG tube, flat, nontender, +BS  :    aneuric, on HD, no barnes  Rectal:  rectal tube in place, + liquid stool draining  MUSC:   no movement to Bilat LE, moving bilat UE against gravity, intermetacarpal  wasting  NEURO:   lethargic   PSYCH:    maritza       SKIN:    specific observations per wound care nurse  LYMPH:      neg  	                   OPIATE NAÏVE (Y/N):  yes  ALLERGIES: No Known Allergies    MEDICATIONS  (STANDING):  acetaminophen    Suspension. 650 milliGRAM(s) Oral every 8 hours  acetylcysteine 20% Inhalation 4 milliLiter(s) Inhalation two times a day  collagenase Ointment 1 Application(s) Topical daily  hydrocortisone 10 milliGRAM(s) Oral every 12 hours  HYDROmorphone  Injectable 0.25 milliGRAM(s) IV Push every 4 hours  insulin glargine Injectable (LANTUS) 7 Unit(s) SubCutaneous at bedtime  insulin regular  human corrective regimen sliding scale   SubCutaneous <User Schedule>  insulin regular  human recombinant 15 Unit(s) SubCutaneous every 6 hours  levETIRAcetam  Solution 500 milliGRAM(s) Oral every 24 hours  metoclopramide 5 milliGRAM(s) Oral every 12 hours  midodrine 10 milliGRAM(s) Oral every 8 hours  modafinil 100 milliGRAM(s) Oral daily  scopolamine   Patch 1 Patch Transdermal every 3 days    MEDICATIONS  (PRN):  HYDROmorphone  Injectable 0.25 milliGRAM(s) IV Push every 1 hour PRN Breakthrough pain    	         LABS REVIEWED  none new                  IMAGING REVIEWED:  n/a  	  ADVANCED DIRECTIVES:    none    Decision Maker:  pts HCP is Cristal [place]   [phone]  Goals of care: comfort focused    PSYCHOSOCIAL-SPIRITUAL ASSESSMENT:        Pt is followed by his personal friend who is a . Pt is of a Sikh persuasion         GOALS OF CARE DISCUSSION:       Palliative care info/counseling provided	           Family meeting-- none scheduled as of now.        Advanced Directives; DNR as of May 2018      	        REFERRALS:        Palliative Med        Unit SW/Case Mgmt              Patient/Family Support-- for support of pts Omar bautista age 7 yo       Ethics

## 2018-06-26 NOTE — PROGRESS NOTE ADULT - ATTENDING COMMENTS
Patient stable and did well on CPAP trial yesterday for several hour as well as today. Will move from CPAP trial to trach collar as able.

## 2018-06-27 LAB
GLUCOSE BLDC GLUCOMTR-MCNC: 104 MG/DL — HIGH (ref 70–99)
GLUCOSE BLDC GLUCOMTR-MCNC: 192 MG/DL — HIGH (ref 70–99)
GLUCOSE BLDC GLUCOMTR-MCNC: 214 MG/DL — HIGH (ref 70–99)
GLUCOSE BLDC GLUCOMTR-MCNC: 232 MG/DL — HIGH (ref 70–99)
GLUCOSE BLDC GLUCOMTR-MCNC: 82 MG/DL — SIGNIFICANT CHANGE UP (ref 70–99)

## 2018-06-27 PROCEDURE — 99233 SBSQ HOSP IP/OBS HIGH 50: CPT

## 2018-06-27 PROCEDURE — 99233 SBSQ HOSP IP/OBS HIGH 50: CPT | Mod: GC

## 2018-06-27 RX ORDER — HYDROMORPHONE HYDROCHLORIDE 2 MG/ML
0.25 INJECTION INTRAMUSCULAR; INTRAVENOUS; SUBCUTANEOUS EVERY 4 HOURS
Qty: 0 | Refills: 0 | Status: DISCONTINUED | OUTPATIENT
Start: 2018-06-27 | End: 2018-06-27

## 2018-06-27 RX ORDER — HYDROMORPHONE HYDROCHLORIDE 2 MG/ML
0.25 INJECTION INTRAMUSCULAR; INTRAVENOUS; SUBCUTANEOUS EVERY 4 HOURS
Qty: 0 | Refills: 0 | Status: DISCONTINUED | OUTPATIENT
Start: 2018-06-27 | End: 2018-06-28

## 2018-06-27 RX ORDER — ACETAMINOPHEN 500 MG
650 TABLET ORAL ONCE
Qty: 0 | Refills: 0 | Status: COMPLETED | OUTPATIENT
Start: 2018-06-27 | End: 2018-06-27

## 2018-06-27 RX ADMIN — HYDROMORPHONE HYDROCHLORIDE 0.25 MILLIGRAM(S): 2 INJECTION INTRAMUSCULAR; INTRAVENOUS; SUBCUTANEOUS at 06:47

## 2018-06-27 RX ADMIN — INSULIN HUMAN 1: 100 INJECTION, SOLUTION SUBCUTANEOUS at 01:12

## 2018-06-27 RX ADMIN — INSULIN HUMAN 15 UNIT(S): 100 INJECTION, SOLUTION SUBCUTANEOUS at 01:12

## 2018-06-27 RX ADMIN — INSULIN HUMAN 2: 100 INJECTION, SOLUTION SUBCUTANEOUS at 12:16

## 2018-06-27 RX ADMIN — INSULIN GLARGINE 7 UNIT(S): 100 INJECTION, SOLUTION SUBCUTANEOUS at 22:33

## 2018-06-27 RX ADMIN — INSULIN HUMAN 15 UNIT(S): 100 INJECTION, SOLUTION SUBCUTANEOUS at 06:45

## 2018-06-27 RX ADMIN — HYDROMORPHONE HYDROCHLORIDE 0.25 MILLIGRAM(S): 2 INJECTION INTRAMUSCULAR; INTRAVENOUS; SUBCUTANEOUS at 14:00

## 2018-06-27 RX ADMIN — HYDROMORPHONE HYDROCHLORIDE 0.25 MILLIGRAM(S): 2 INJECTION INTRAMUSCULAR; INTRAVENOUS; SUBCUTANEOUS at 22:50

## 2018-06-27 RX ADMIN — MIDODRINE HYDROCHLORIDE 10 MILLIGRAM(S): 2.5 TABLET ORAL at 13:48

## 2018-06-27 RX ADMIN — Medication 5 MILLIGRAM(S): at 18:31

## 2018-06-27 RX ADMIN — MIDODRINE HYDROCHLORIDE 10 MILLIGRAM(S): 2.5 TABLET ORAL at 22:32

## 2018-06-27 RX ADMIN — Medication 4 MILLILITER(S): at 06:46

## 2018-06-27 RX ADMIN — HYDROMORPHONE HYDROCHLORIDE 0.25 MILLIGRAM(S): 2 INJECTION INTRAMUSCULAR; INTRAVENOUS; SUBCUTANEOUS at 18:29

## 2018-06-27 RX ADMIN — Medication 4 MILLILITER(S): at 18:31

## 2018-06-27 RX ADMIN — HYDROMORPHONE HYDROCHLORIDE 0.25 MILLIGRAM(S): 2 INJECTION INTRAMUSCULAR; INTRAVENOUS; SUBCUTANEOUS at 06:44

## 2018-06-27 RX ADMIN — Medication 10 MILLIGRAM(S): at 06:46

## 2018-06-27 RX ADMIN — MIDODRINE HYDROCHLORIDE 10 MILLIGRAM(S): 2.5 TABLET ORAL at 06:45

## 2018-06-27 RX ADMIN — Medication 650 MILLIGRAM(S): at 13:45

## 2018-06-27 RX ADMIN — HYDROMORPHONE HYDROCHLORIDE 0.25 MILLIGRAM(S): 2 INJECTION INTRAMUSCULAR; INTRAVENOUS; SUBCUTANEOUS at 01:17

## 2018-06-27 RX ADMIN — Medication 650 MILLIGRAM(S): at 09:13

## 2018-06-27 RX ADMIN — HYDROMORPHONE HYDROCHLORIDE 0.25 MILLIGRAM(S): 2 INJECTION INTRAMUSCULAR; INTRAVENOUS; SUBCUTANEOUS at 13:46

## 2018-06-27 RX ADMIN — Medication 1 APPLICATION(S): at 12:15

## 2018-06-27 RX ADMIN — LEVETIRACETAM 500 MILLIGRAM(S): 250 TABLET, FILM COATED ORAL at 13:45

## 2018-06-27 RX ADMIN — INSULIN HUMAN 2: 100 INJECTION, SOLUTION SUBCUTANEOUS at 06:45

## 2018-06-27 RX ADMIN — HYDROMORPHONE HYDROCHLORIDE 0.25 MILLIGRAM(S): 2 INJECTION INTRAMUSCULAR; INTRAVENOUS; SUBCUTANEOUS at 18:14

## 2018-06-27 RX ADMIN — Medication 650 MILLIGRAM(S): at 23:30

## 2018-06-27 RX ADMIN — INSULIN HUMAN 15 UNIT(S): 100 INJECTION, SOLUTION SUBCUTANEOUS at 12:16

## 2018-06-27 RX ADMIN — Medication 650 MILLIGRAM(S): at 06:46

## 2018-06-27 RX ADMIN — Medication 650 MILLIGRAM(S): at 22:32

## 2018-06-27 RX ADMIN — HYDROMORPHONE HYDROCHLORIDE 0.25 MILLIGRAM(S): 2 INJECTION INTRAMUSCULAR; INTRAVENOUS; SUBCUTANEOUS at 22:33

## 2018-06-27 RX ADMIN — Medication 10 MILLIGRAM(S): at 18:28

## 2018-06-27 RX ADMIN — Medication 5 MILLIGRAM(S): at 06:45

## 2018-06-27 RX ADMIN — INSULIN HUMAN 15 UNIT(S): 100 INJECTION, SOLUTION SUBCUTANEOUS at 18:30

## 2018-06-27 RX ADMIN — MODAFINIL 100 MILLIGRAM(S): 200 TABLET ORAL at 12:15

## 2018-06-27 RX ADMIN — Medication 650 MILLIGRAM(S): at 14:15

## 2018-06-27 NOTE — PROGRESS NOTE ADULT - PROBLEM SELECTOR PLAN 4
HD dependant.  Last HD session was Saturday June 23  renal ok'd the use of pts HC catheter for IV access if needed.

## 2018-06-27 NOTE — PROGRESS NOTE ADULT - PROBLEM SELECTOR PLAN 7
-continue scopolamine patch for respiratory secretions   -can consider lorazepam 2mg iv q6h prn for agitation, but I would anticipate that pt will become increasingly lethargic as he goes into renal failure and accumulates toxins  -If pts HCP wishes to continue vent support, may need to consider transfer to a vent facility with a hospice support at the facility (rather than [facility]- which will be difficult to access while pt is on feeds and the vent)  -Sw updated as pts HCP requesting applications be sent to [facility] and [facility]

## 2018-06-27 NOTE — PROGRESS NOTE ADULT - PROBLEM SELECTOR PLAN 6
AICD has been deactivated   no MEWS, no escalation of care.  comfort focused care. Family requesting hospice care at is next wetting in light of his poor prognosis and anticipated death.   consider stopping medications that are not contributing to pts comfort (provigil)

## 2018-06-27 NOTE — PROGRESS NOTE ADULT - PROBLEM SELECTOR PLAN 1
Resolved. Meeting SIRS criteria with leukocytosis and fever on 6/15. Source likely unsteagable/incurable sacral ulcer with e/o osteo. Was on Vanc and Zosyn, however, in this situation where patient has non-surgical sacral wound and poor prognosis, little utility in continuing abx at this time as patient likely to develop resistance  - antibiotics discontinued as patient has a chronic incurable infection. Discussed at family meeting.   - decision for no escalation of antibiotics, no escalation of care, no MEWS    #Sacral decubitus ulcer- unstageable s/p debridement with plastic surgery. Cultures growing multiple organisms including Pseudomonas and E faecium. Pelvic MRI showing acute osteomyelitis and loculated collection abutting the bladder. Patient is not a surgical candidate based on his comorbidities and inability lay in prone positioning post-op ([id number]).   - discontinued abx as above   - pain control   - frequent turning q2h for pressure offloading, air fluid mattress  - wound care with dry dressings with Allevyn over top  - soilage control with rectal tube    #UTI- Patient with urinary retention requiring chronic indwelling Joes catheter. Urine cultures growing Sherrie Dubliniensis sensitive to Fluconazole.  - s/p Fluconazole Resolved. Meeting SIRS criteria with leukocytosis and fever on 6/15. Source likely unsteagable/incurable sacral ulcer with e/o osteo. Was on Vanc and Zosyn, however, in this situation where patient has non-surgical sacral wound and poor prognosis, little utility in continuing abx at this time as patient likely to develop resistance  - antibiotics discontinued as patient has a chronic incurable infection. Discussed at family meeting.   - decision for no escalation of antibiotics, no escalation of care, no MEWS    #Sacral decubitus ulcer- unstageable s/p debridement with plastic surgery. Cultures growing multiple organisms including Pseudomonas and E faecium. Pelvic MRI showing acute osteomyelitis and loculated collection abutting the bladder. Patient is not a surgical candidate based on his comorbidities and inability lay in prone positioning post-op ([id number]).   - discontinued abx as above   - pain control w Dilaudid 0.25mg q4hrs and 0.25mg q1hr PRN   - frequent turning q2h for pressure offloading, air fluid mattress  - wound care with dry dressings with Allevyn over top  - soilage control with rectal tube    #UTI- Patient with urinary retention requiring chronic indwelling Jose catheter. Urine cultures growing Sherrie Dubliniensis sensitive to Fluconazole.  - s/p Fluconazole

## 2018-06-27 NOTE — PROGRESS NOTE ADULT - PROBLEM SELECTOR PLAN 2
Acute respiratory failure in setting of septic and cardiogenic shock, with trach placed on 4/5/18 for persistent, now chronic respiratory failure.   - Dilaudid 0.25mg IV PRN for tachypnea for comfort  - scopolamine PRN secretions  - CPAP trials yesterday and today successful.   - trach collar trial today Acute respiratory failure in setting of septic and cardiogenic shock, with trach placed on 4/5/18 for persistent, now chronic respiratory failure.   - Dilaudid 0.25mg IV q1hr PRN for tachypnea for comfort  - scopolamine PRN secretions  - CPAP trials yesterday and today successful.   - trach collar trial today

## 2018-06-27 NOTE — PROGRESS NOTE ADULT - SUBJECTIVE AND OBJECTIVE BOX
PGY1 PROGRESS NOTE:    HOSPITAL COURSE:  63M PMH HFrEF 10-15% (ischemic), CAD (STEMI per [facility] records 7/17), s/p AICD, Afib, HTN, DM2 (on insulin), CKD, gout admitted for septic shock 2/2 LE cellulitis as well as concern for ATN in setting of shock. Patient was admitted, started on broad spectrum antibiotics and started on pressors for presumed septic shock. However given low mix venous saturation there was c/f component of cardiogenic shock and pt was given inotropes however this was d/c due to tachycardia. He received an extensive infectious w/u which included a gallium scan c/w LLE cellulitis and a TTE w/o vegetations. Patient’s course was c/b worsening renal function and eventual anuria, for which renal was consulted. HD cath was placed and pt began CVVD and eventually transitioned to intermittent HD. His course was further c/b b/l pulmonary effusions requiring R chest tube placement. Given extensive cardiac hx and component of cardiogenic shock, cardiology was consulted and pt started on vasopressin for blood pressure and afterload reducers however unable to tolerate afterload reduction. Transient c/f HIT given platelet drop while on hep gtt, however ruled out and pt transitioned back to hep gtt and then bridged to Coumadin for AFib AC. Patient’s course was further c/b stroke when patient became unresponsive with decreased use of L side (stroke code -- CT negative, MR showing chronic infarcts and possible small brainstem stroke) requiring intubation for airway protection. His mental status did not improve after starting modafinil, vEEG done to r/o seizures showing slowing but no epileptiform discharges. Patient trach’d and PEG’d because unable to wean off vent and unable to mentate enough to swallow. Pt later found to be fungemic with candida tropicalis and treated with micafungin then narrowed to fluconazole. RUKHSANA at that time negative for vegetations or AICD infection. CT A/P performed to evaluate source which was unrevealing. HD cath removed and replaced after surveillance cultures remained negative. Course further complicated by Klebsiella bacteremia, treated with meropenem then de-escalated to Zosyn and de-escalated further to CTX. Upon weaning from pressors, pt started on hydrocortisone and midodrine. Pt with persistent fevers, HD cath again removed 4/19 and replaced with temporary HD cath. Patient then noted to have seizure like activity and started on Keppra. Pt also subsequently found to have septic shock 2/2 aspiration PNA, s/p treatment with Zosyn and pressors. Pt again weaned off pressors to stress dose steroids and midodrine for adrenal insufficiency. Patient unable to be weaned to trach collar and continued on mechanical ventilation. HD was continued on HD and eventually weaned off of albumin with end goal of L-TACH. Patient made DNR with further discussion with family regarding goals of care- but continued escalation of care, with pressors, etc. Course then c/b sepsis 2/2 candidal UTI and sacral decub ulcer with e/o osteomyelitis and abscess. Plastic surgery involved and debrided ulcer, but assessment was that ulcer was unstageable and incurable. Patient was deemed not a surgical candidate based on his comorbidities and inability lay in prone positioning post-op ([id number]). Multidisciplinary family meeting held involving social work, medical team, nursing staff to discuss utility in continuation of abx therapy given the complexity of the patients condition and poor prognosis. Family shared that they understand that pt is not improving despite all effort and made the decision to pursue comfort measures including no MEWS, escalation of care or continuation of antibiotic therapy. AICD was turned off, and antibiotics discontinued. Patient received last HD on Saturday 6/23/18. Now on Dilaudid ATC for comfort and pain. Attempts made to wean patient to trach collar daily for potential placement in hospice facility for remainder of days.    CC: septic Shock due to bilateral cellulitis - course complicated by renal failure (on hemodialysis), stroke, and respiratory failure. (01 Jun 2018 14:51)    OVERNIGHT EVENTS: ELSIE.     SUBJECTIVE / INTERVAL HPI:  Awake but not as responsive as day prior. Intermittently responding to questions via head nods. Not following commands. Patient not in any pain and is comfortable.     ROS: negative unless otherwise specified    VITAL SIGNS:  T(F): 97.8 (25 Jun 2018 06:12), Max: 101 (24 Jun 2018 15:58)  HR: 98 (25 Jun 2018 06:12) (90 - 99)  BP: 94/61 (25 Jun 2018 06:12) (81/54 - 131/68)  RR: 20 (25 Jun 2018 06:12) (13 - 22)  SpO2: 100% (25 Jun 2018 06:12) (98% - 100%)    PHYSICAL EXAM:  Constitutional: thin, cachectic, chronically ill appearing, NAD   HEENT: temporal wasting, PERRLA, EOMI, dry MM  Neck: No JVD, tracheostomy with minimal mucoid discharge  Respiratory: transmitted upper resp sounds, course breath sounds b/l, rhonchi b/l  Cardiovascular: AICD in place, normal S1S2, no MRG  Gastrointestinal: non-distended, soft, +BS, +PEG tube, +rectal tube with green-brown watery stool  Extremities: hyperpigmented skin on anterior shin RLE, trace dependent edema, hands in braces to prevent contracture  Musculoskeletal: increased rigidity in UE, atrophied musculature in b/l LE  Vascular: 1+ peripheral pulses  Neurological: non-verbal, responding intermittently via head nod, not following commands, moving all extremities, no focal deficits  Skin: Large [id number] unstageable sacral ulcer with exposed necrotic tissue and muscles and tracking underneath in all directions, foul smelling, black eschar overlying tissue, non-bleeding, no purulent material    MEDICATIONS:  MEDICATIONS  (STANDING):  acetaminophen    Suspension. 650 milliGRAM(s) Oral every 8 hours  acetylcysteine 20% Inhalation 4 milliLiter(s) Inhalation two times a day  aspirin  chewable 81 milliGRAM(s) Oral daily  collagenase Ointment 1 Application(s) Topical daily  escitalopram 5 milliGRAM(s) Oral daily  hydrocortisone 10 milliGRAM(s) Oral every 12 hours  HYDROmorphone  Injectable 0.25 milliGRAM(s) IV Push every 4 hours  insulin glargine Injectable (LANTUS) 7 Unit(s) SubCutaneous at bedtime  insulin regular  human corrective regimen sliding scale   SubCutaneous <User Schedule>  insulin regular  human recombinant 15 Unit(s) SubCutaneous every 6 hours  levETIRAcetam  Solution 500 milliGRAM(s) Oral every 24 hours  metoclopramide 5 milliGRAM(s) Oral every 12 hours  midodrine 10 milliGRAM(s) Oral every 8 hours  modafinil 100 milliGRAM(s) Oral daily  scopolamine   Patch 1 Patch Transdermal every 3 days      ALLERGIES:  No Known Allergies      LABS:       No Labs        RADIOLOGY & ADDITIONAL TESTS: Reviewed. PGY1 PROGRESS NOTE:    HOSPITAL COURSE:  63M PMH HFrEF 10-15% (ischemic), CAD (STEMI per [facility] records 7/17), s/p AICD, Afib, HTN, DM2 (on insulin), CKD, gout admitted for septic shock 2/2 LE cellulitis as well as concern for ATN in setting of shock. Patient was admitted, started on broad spectrum antibiotics and started on pressors for presumed septic shock. However given low mix venous saturation there was c/f component of cardiogenic shock and pt was given inotropes however this was d/c due to tachycardia. He received an extensive infectious w/u which included a gallium scan c/w LLE cellulitis and a TTE w/o vegetations. Patient’s course was c/b worsening renal function and eventual anuria, for which renal was consulted. HD cath was placed and pt began CVVD and eventually transitioned to intermittent HD. His course was further c/b b/l pulmonary effusions requiring R chest tube placement. Given extensive cardiac hx and component of cardiogenic shock, cardiology was consulted and pt started on vasopressin for blood pressure and afterload reducers however unable to tolerate afterload reduction. Transient c/f HIT given platelet drop while on hep gtt, however ruled out and pt transitioned back to hep gtt and then bridged to Coumadin for AFib AC. Patient’s course was further c/b stroke when patient became unresponsive with decreased use of L side (stroke code -- CT negative, MR showing chronic infarcts and possible small brainstem stroke) requiring intubation for airway protection. His mental status did not improve after starting modafinil, vEEG done to r/o seizures showing slowing but no epileptiform discharges. Patient trach’d and PEG’d because unable to wean off vent and unable to mentate enough to swallow. Pt later found to be fungemic with candida tropicalis and treated with micafungin then narrowed to fluconazole. RUKHSANA at that time negative for vegetations or AICD infection. CT A/P performed to evaluate source which was unrevealing. HD cath removed and replaced after surveillance cultures remained negative. Course further complicated by Klebsiella bacteremia, treated with meropenem then de-escalated to Zosyn and de-escalated further to CTX. Upon weaning from pressors, pt started on hydrocortisone and midodrine. Pt with persistent fevers, HD cath again removed 4/19 and replaced with temporary HD cath. Patient then noted to have seizure like activity and started on Keppra. Pt also subsequently found to have septic shock 2/2 aspiration PNA, s/p treatment with Zosyn and pressors. Pt again weaned off pressors to stress dose steroids and midodrine for adrenal insufficiency. Patient unable to be weaned to trach collar and continued on mechanical ventilation. HD was continued on HD and eventually weaned off of albumin with end goal of L-TACH. Patient made DNR with further discussion with family regarding goals of care- but continued escalation of care, with pressors, etc. Course then c/b sepsis 2/2 candidal UTI and sacral decub ulcer with e/o osteomyelitis and abscess. Plastic surgery involved and debrided ulcer, but assessment was that ulcer was unstageable and incurable. Patient was deemed not a surgical candidate based on his comorbidities and inability lay in prone positioning post-op ([id number]). Multidisciplinary family meeting held involving social work, medical team, nursing staff to discuss utility in continuation of abx therapy given the complexity of the patients condition and poor prognosis. Family shared that they understand that pt is not improving despite all effort and made the decision to pursue comfort measures including no MEWS, escalation of care or continuation of antibiotic therapy. AICD was turned off, and antibiotics discontinued. Patient received last HD on Saturday 6/23/18. Now on Dilaudid ATC for comfort and pain. Attempts made to wean patient to trach collar daily for potential placement in hospice facility for remainder of days.    CC: septic Shock due to bilateral cellulitis - course complicated by renal failure (on hemodialysis), stroke, and respiratory failure. (01 Jun 2018 14:51)    OVERNIGHT EVENTS: ELSIE.     SUBJECTIVE / INTERVAL HPI:  Awake but intermittently responding to questions via head nods. Following some commands. Patient not in any pain and is comfortable.     ROS: negative unless otherwise specified    VITAL SIGNS:  T(F): 99.7 (27 Jun 2018 13:46), Max: 100 (27 Jun 2018 08:48)  HR: 89 (27 Jun 2018 13:46) (84 - 95)  BP: 98/63 (27 Jun 2018 13:46) (84/63 - 108/75)  RR: 24 (27 Jun 2018 13:46) (18 - 24)  SpO2: 99% (27 Jun 2018 13:46) (98% - 100%)    PHYSICAL EXAM:  Constitutional: thin, cachectic, chronically ill appearing, NAD   HEENT: temporal wasting, PERRLA, EOMI, dry MM  Neck: No JVD, tracheostomy with minimal mucoid discharge  Respiratory: transmitted upper resp sounds, course breath sounds b/l, rhonchi b/l  Cardiovascular: AICD in place, normal S1S2, no MRG  Gastrointestinal: non-distended, soft, +BS, +PEG tube, +rectal tube with green-brown watery stool  Extremities: hyperpigmented skin on anterior shin RLE, trace dependent edema, hands in braces to prevent contracture  Musculoskeletal: increased rigidity in UE, atrophied musculature in b/l LE  Vascular: 1+ peripheral pulses  Neurological: non-verbal, responding intermittently via head nod, not following commands, moving all extremities, no focal deficits  Skin: Large [id number] unstageable sacral ulcer with exposed necrotic tissue and muscles and tracking underneath in all directions, foul smelling, black eschar overlying tissue, non-bleeding, no purulent material    MEDICATIONS:  MEDICATIONS  (STANDING):  acetaminophen    Suspension. 650 milliGRAM(s) Oral every 8 hours  acetylcysteine 20% Inhalation 4 milliLiter(s) Inhalation two times a day  aspirin  chewable 81 milliGRAM(s) Oral daily  collagenase Ointment 1 Application(s) Topical daily  escitalopram 5 milliGRAM(s) Oral daily  hydrocortisone 10 milliGRAM(s) Oral every 12 hours  HYDROmorphone  Injectable 0.25 milliGRAM(s) IV Push every 4 hours  insulin glargine Injectable (LANTUS) 7 Unit(s) SubCutaneous at bedtime  insulin regular  human corrective regimen sliding scale   SubCutaneous <User Schedule>  insulin regular  human recombinant 15 Unit(s) SubCutaneous every 6 hours  levETIRAcetam  Solution 500 milliGRAM(s) Oral every 24 hours  metoclopramide 5 milliGRAM(s) Oral every 12 hours  midodrine 10 milliGRAM(s) Oral every 8 hours  modafinil 100 milliGRAM(s) Oral daily  scopolamine   Patch 1 Patch Transdermal every 3 days      ALLERGIES:  No Known Allergies      LABS:       No Labs        RADIOLOGY & ADDITIONAL TESTS: Reviewed.

## 2018-06-27 NOTE — PROGRESS NOTE ADULT - PROBLEM SELECTOR PLAN 3
remains vent dependent but CPAP trials in progress. Pt tolerated a small amount of TC yesterday.   Pt's HCP is not agreeable to removing ventilatory support at this time.    Continue CPAP trials at this time  Pt's HCP agreeable to transferring pt to a vent facility with hospice care in place due to pts overall poor prognosis.

## 2018-06-27 NOTE — PROGRESS NOTE ADULT - SUBJECTIVE AND OBJECTIVE BOX
Palliative Medicine  following  this 63 year old male who has been hospitalized over 3 months.  Pt was admitted with cellulitis in March 2018.  His course was complicated by septic shock, cardiogenic shock, renal failure, respiratory failure, multiple infections and cva with seizure activity.  Pt remains mechanically vented, hemodialysis dependant, completely disabled requiring complete care for ADLs.  He is fed enterally through a PEG tube. Pt has developed significant large decubitus ulcer which is concerning for osteomyelitis (work up in progress). As pt is fecally incontinent, he continues to contaminate the wound with fecal matter.    Interval history:  stable.   last HD was Saturday June 23. last labs on June 23 (WB of 19).  remains with leukocytosis. Stable unstageable large sacral wound with tunnelling and foul odor.   Antibiotics have completed.  Pt noted to be more awake over the weekend, and denying pain, thus did not receive the scheduled pain medications.     Past 24 hours-  Pt tolerated CPAP for several hours and then did 45 minutes of TC yesterday evening.  Pt on CPAP since 630 am today.   Pt awake at times. Denying pain.  Received doses as scheduled.  Pt received 1 prn dose last night at 5pm.  Hemodynamically stable, although low grade fever noted-- . Pts dgt noted sweating on pt.   Pts dgt and HCP Cristal at bedside.   renal ok with using pts HD cath for IV access as HD has been discontinued.    PAIN (0-10):  0/10  DYSPNEA (Y/N):  no  NAUSEA/VOMITING (Y/N):  no  SECRETIONS (Y/N):  yes  AGITATION (Y/N): no    OTHER REVIEW OF SYSTEMS:  UNABLE TO OBTAIN  due to:  altered mental status    ICU Vital Signs Last 24 Hrs  T(C): 37.7 (27 Jun 2018 09:50), Max: 37.8 (27 Jun 2018 08:48)  T(F): 99.9 (27 Jun 2018 09:50), Max: 100 (27 Jun 2018 08:48)  HR: 89 (27 Jun 2018 09:50) (78 - 95)  BP: 88/59 (27 Jun 2018 09:50) (84/63 - 108/75)  BP(mean): --  ABP: --  ABP(mean): --  RR: 24 (27 Jun 2018 09:50) (18 - 24)  SpO2: 99% (27 Jun 2018 09:50) (98% - 100%)    GENERAL:  , chronically ill appearing. thin and frail, not distressed  HEENT:  NC/AT, normocephalic, sclera anicteric, temporal wasting  NECK:    supple, no JVD  CVS:     reg, + left chest wall PPM, R sided perm cath  RESP:  vented, course, + mucoid secretions with suctioning,  sonorous breath sounds, on AC- with CPAP trials  GI:    + PEG tube, flat, nontender, +BS  :    aneuric, on HD, no barnes  Rectal:  rectal tube in place, + liquid stool draining  MUSC:   no movement to Bilat LE, moving bilat UE against gravity, intermetacarpal  wasting  NEURO:   arousable  PSYCH:    maritza       SKIN:    specific observations per wound care nurse  LYMPH:      neg  	                   OPIATE NAÏVE (Y/N):  yes  ALLERGIES: No Known Allergies    MEDICATIONS  (STANDING):  acetaminophen    Suspension. 650 milliGRAM(s) Oral every 8 hours  acetylcysteine 20% Inhalation 4 milliLiter(s) Inhalation two times a day  collagenase Ointment 1 Application(s) Topical daily  hydrocortisone 10 milliGRAM(s) Oral every 12 hours  HYDROmorphone  Injectable 0.25 milliGRAM(s) IV Push every 4 hours  insulin glargine Injectable (LANTUS) 7 Unit(s) SubCutaneous at bedtime  insulin regular  human corrective regimen sliding scale   SubCutaneous <User Schedule>  insulin regular  human recombinant 15 Unit(s) SubCutaneous every 6 hours  levETIRAcetam  Solution 500 milliGRAM(s) Oral every 24 hours  metoclopramide 5 milliGRAM(s) Oral every 12 hours  midodrine 10 milliGRAM(s) Oral every 8 hours  modafinil 100 milliGRAM(s) Oral daily  scopolamine   Patch 1 Patch Transdermal every 3 days    MEDICATIONS  (PRN):  HYDROmorphone  Injectable 0.25 milliGRAM(s) IV Push every 1 hour PRN Breakthrough pain    LABS REVIEWED  last lab draw 6/23                IMAGING REVIEWED:  n/a  	  ADVANCED DIRECTIVES:    none    Decision Maker:  pts HCP is Cristal [place]   [phone]  Goals of care: comfort focused    PSYCHOSOCIAL-SPIRITUAL ASSESSMENT:        Pt is followed by his personal friend who is a . Pt is of a [employer] persuasion         GOALS OF CARE DISCUSSION:       Palliative care info/counseling provided	           Family meeting-- none scheduled as of now.        Advanced Directives; DNR as of May 2018      	        REFERRALS:        Palliative Med        Unit SW/Case Mgmt              Patient/Family Support-- for support of pts g son, Omar age 5 yo       Ethics

## 2018-06-27 NOTE — CHART NOTE - NSCHARTNOTEFT_GEN_A_CORE
Admitting Diagnosis:   63M PMH HFrEF 10-15% (ischemic), MI, s/p AICD vs PPM, possible Afib, HTN, DM2 on insulin, possible CKD, and gout, who presents with a chief complaint of generalized weakness s/p septic shock likely 2/2 LE cellulitis. AMS and new leukocytisis likely 2/2 UTI. Trach and PEG dependent. Continues to receive HD TIW. Stable on RMF    PAST MEDICAL & SURGICAL HISTORY:  Type 2 diabetes mellitus with diabetic peripheral angiopathy without gangrene, with long-term curren  Essential hypertension, benign  Gout  Pacemaker  Chronic systolic heart failure  Myocardial infarction  No significant past surgical history    Current Nutrition Order:   Jevity 1.5 Terrell @ 62mL/hr x 24hrs plus ProStat Sugar Free BID (200 kcal, 30g protein) via PEG to provide in total: 1488 mL TV, 2432 kcal, 125g protein, 1131 mL free H2O, 148% RDI, 1.40g/kg IBW protein.      PO Intake: Good (%) [   ]  Fair (50-75%) [   ] Poor (<25%) [   ]- N/A NPO with TF    GI Issues: Rectal tube in place for diarrhea     Pain: Unable to assess at this time 2/2 vent     Skin Integrity:  L buttock unstageable; R. buttock II , sacrum unstageable   L calf venous ulcer  Trach stage 3 PU  L. UE eschar     Labs:   No further lab draws        CAPILLARY BLOOD GLUCOSE      POCT Blood Glucose.: 232 mg/dL (2018 12:15)  POCT Blood Glucose.: 214 mg/dL (2018 06:39)  POCT Blood Glucose.: 192 mg/dL (2018 00:31)  POCT Blood Glucose.: 145 mg/dL (2018 21:58)  POCT Blood Glucose.: 194 mg/dL (2018 17:40)      Medications:  MEDICATIONS  (STANDING):  acetaminophen    Suspension. 650 milliGRAM(s) Oral every 8 hours  acetylcysteine 20% Inhalation 4 milliLiter(s) Inhalation two times a day  collagenase Ointment 1 Application(s) Topical daily  hydrocortisone 10 milliGRAM(s) Oral every 12 hours  HYDROmorphone  Injectable 0.25 milliGRAM(s) IV Push every 4 hours  insulin glargine Injectable (LANTUS) 7 Unit(s) SubCutaneous at bedtime  insulin regular  human corrective regimen sliding scale   SubCutaneous <User Schedule>  insulin regular  human recombinant 15 Unit(s) SubCutaneous every 6 hours  levETIRAcetam  Solution 500 milliGRAM(s) Oral every 24 hours  metoclopramide 5 milliGRAM(s) Oral every 12 hours  midodrine 10 milliGRAM(s) Oral every 8 hours  modafinil 100 milliGRAM(s) Oral daily  scopolamine   Patch 1 Patch Transdermal every 3 days    MEDICATIONS  (PRN):  HYDROmorphone  Injectable 0.25 milliGRAM(s) IV Push every 1 hour PRN Breakthrough pain      Weight:  Daily     Daily Weight in k.3kg ()      Weight:  64.5kg ()  66kg ()  71.1kg ()  74.3kg ()  74.6kg ()  80.3kg ()  85.6kg ()  86.8kg ()  86.2kg (5/3)  85.1kg ()  79.1kg ()  75.9kg ()  77.4kg ()  72.7kg ()  77.2kg ()  80.9 kg ()  84.5kg (3/31)  86.9kg (3/19)  94.7 kg (3/16)    Weight Change:   Weight fluctuating throughout admission d/t HD, TF inconsistencies    Estimated energy needs using 89 kg IBW; Needs estimated 2/2 vent/post-op/PU/HD  Calories: 25-30 kcal/kg = 8379-9765 kcal/day  Protein: 1.4-1.6 g/kg = 125-142 g protein/day  Fluids: per team 2/2 HD     Subjective:   S/p trach and PEG placements on . S/p permacath replacement on . Stable on RMF at this time. Course c/b acute osteomyelitis 2/2 infected sacral decubitus ulcer, as seen on pelvic MRI- continues to be infiltrated by fecal matter. Pt seen in room, awake with eyes open. Nodding yes/no to questions today. Pt is for comfort care at this time, no escalation of care. No further HD. Trached to vent on CPAP. TF running at goal. Will continue to keep nutrition in line with goals of care at all times. Pending inpt hospice vs. SNF for comfort care.     Previous Nutrition Diagnosis:   Increased protein-calorie needs RT increased demand for protein-calorie intake AEB on vent support, ESRD on HD, malnutrition     Active [X]  Resolved [   ]    New PES statement:     Goal:   Continue to meet % of nutrition needs via tolerated route.     Recommendations:  1. Optimize nutrition and hydration within goals of care  2. Please re-consult if goals of care change  3. Ensure pain control at all times     Education:   N/A-vent    Risk Level: High [  ] Moderate [   ] Low [  X ]. Admitting Diagnosis:   63M PMH HFrEF 10-15% (ischemic), MI, s/p AICD vs PPM, possible Afib, HTN, DM2 on insulin, possible CKD, and gout, who presents with a chief complaint of generalized weakness s/p septic shock likely 2/2 LE cellulitis. AMS and new leukocytisis likely 2/2 UTI. Trach and PEG dependent. Continues to receive HD TIW. Stable on RMF    PAST MEDICAL & SURGICAL HISTORY:  Type 2 diabetes mellitus with diabetic peripheral angiopathy without gangrene, with long-term curren  Essential hypertension, benign  Gout  Pacemaker  Chronic systolic heart failure  Myocardial infarction  No significant past surgical history    Current Nutrition Order:   Jevity 1.5 Terrell @ 62mL/hr x 24hrs plus ProStat Sugar Free BID (200 kcal, 30g protein) via PEG to provide in total: 1488 mL TV, 2432 kcal, 125g protein, 1131 mL free H2O, 148% RDI, 1.40g/kg IBW protein.      PO Intake: Good (%) [   ]  Fair (50-75%) [   ] Poor (<25%) [   ]- N/A NPO with TF    GI Issues: Rectal tube in place for diarrhea     Pain: Unable to assess at this time 2/2 vent     Skin Integrity:  L buttock unstageable; R. buttock II , sacrum unstageable   L calf venous ulcer  Trach stage 3 PU  L. UE eschar     Labs:   No further lab draws        CAPILLARY BLOOD GLUCOSE      POCT Blood Glucose.: 232 mg/dL (2018 12:15)  POCT Blood Glucose.: 214 mg/dL (2018 06:39)  POCT Blood Glucose.: 192 mg/dL (2018 00:31)  POCT Blood Glucose.: 145 mg/dL (2018 21:58)  POCT Blood Glucose.: 194 mg/dL (2018 17:40)      Medications:  MEDICATIONS  (STANDING):  acetaminophen    Suspension. 650 milliGRAM(s) Oral every 8 hours  acetylcysteine 20% Inhalation 4 milliLiter(s) Inhalation two times a day  collagenase Ointment 1 Application(s) Topical daily  hydrocortisone 10 milliGRAM(s) Oral every 12 hours  HYDROmorphone  Injectable 0.25 milliGRAM(s) IV Push every 4 hours  insulin glargine Injectable (LANTUS) 7 Unit(s) SubCutaneous at bedtime  insulin regular  human corrective regimen sliding scale   SubCutaneous <User Schedule>  insulin regular  human recombinant 15 Unit(s) SubCutaneous every 6 hours  levETIRAcetam  Solution 500 milliGRAM(s) Oral every 24 hours  metoclopramide 5 milliGRAM(s) Oral every 12 hours  midodrine 10 milliGRAM(s) Oral every 8 hours  modafinil 100 milliGRAM(s) Oral daily  scopolamine   Patch 1 Patch Transdermal every 3 days    MEDICATIONS  (PRN):  HYDROmorphone  Injectable 0.25 milliGRAM(s) IV Push every 1 hour PRN Breakthrough pain      Weight:  Daily     Daily Weight in k.3kg ()      Weight:  64.5kg ()  66kg ()  71.1kg ()  74.3kg ()  74.6kg ()  80.3kg ()  85.6kg ()  86.8kg ()  86.2kg (5/3)  85.1kg ()  79.1kg ()  75.9kg ()  77.4kg ()  72.7kg ()  77.2kg ()  80.9 kg ()  84.5kg (3/31)  86.9kg (3/19)  94.7 kg (3/16)    Weight Change:   Weight fluctuating throughout admission d/t HD, TF inconsistencies    Estimated energy needs using 89 kg IBW; Needs estimated 2/2 vent/post-op/PU/HD  Calories: 25-30 kcal/kg = 9308-8186 kcal/day  Protein: 1.4-1.6 g/kg = 125-142 g protein/day  Fluids: per team 2/2 HD     Subjective:   S/p trach and PEG placements on . S/p permacath replacement on . Stable on RMF at this time. Course c/b acute osteomyelitis 2/2 infected sacral decubitus ulcer, as seen on pelvic MRI- continues to be infiltrated by fecal matter. Pt seen in room, awake with eyes open. Tracking w/eyes, not verbally responsive. Pt is for comfort care at this time, no escalation of care. No further HD. Trached to vent on CPAP. TF running at goal. Will continue to keep nutrition in line with goals of care at all times. Pending inpt hospice vs. SNF for comfort care.     Previous Nutrition Diagnosis:   Increased protein-calorie needs RT increased demand for protein-calorie intake AEB on vent support, ESRD on HD, malnutrition     Active [X]  Resolved [   ]    New PES statement:     Goal:   Continue to meet % of nutrition needs via tolerated route.     Recommendations:  1. Optimize nutrition and hydration within goals of care  2. Please re-consult if goals of care change  3. Ensure pain control at all times     Education:   N/A-vent    Risk Level: High [  ] Moderate [   ] Low [  X ].

## 2018-06-28 DIAGNOSIS — Z91.89 OTHER SPECIFIED PERSONAL RISK FACTORS, NOT ELSEWHERE CLASSIFIED: ICD-10-CM

## 2018-06-28 LAB
GLUCOSE BLDC GLUCOMTR-MCNC: 139 MG/DL — HIGH (ref 70–99)
GLUCOSE BLDC GLUCOMTR-MCNC: 155 MG/DL — HIGH (ref 70–99)
GLUCOSE BLDC GLUCOMTR-MCNC: 186 MG/DL — HIGH (ref 70–99)
GLUCOSE BLDC GLUCOMTR-MCNC: 71 MG/DL — SIGNIFICANT CHANGE UP (ref 70–99)
GLUCOSE BLDC GLUCOMTR-MCNC: 88 MG/DL — SIGNIFICANT CHANGE UP (ref 70–99)

## 2018-06-28 PROCEDURE — 99233 SBSQ HOSP IP/OBS HIGH 50: CPT

## 2018-06-28 PROCEDURE — 99233 SBSQ HOSP IP/OBS HIGH 50: CPT | Mod: GC

## 2018-06-28 RX ORDER — HYDROMORPHONE HYDROCHLORIDE 2 MG/ML
1 INJECTION INTRAMUSCULAR; INTRAVENOUS; SUBCUTANEOUS EVERY 4 HOURS
Qty: 0 | Refills: 0 | Status: DISCONTINUED | OUTPATIENT
Start: 2018-06-28 | End: 2018-06-28

## 2018-06-28 RX ORDER — MORPHINE SULFATE 50 MG/1
2 CAPSULE, EXTENDED RELEASE ORAL ONCE
Qty: 0 | Refills: 0 | Status: DISCONTINUED | OUTPATIENT
Start: 2018-06-28 | End: 2018-06-28

## 2018-06-28 RX ORDER — HYDROMORPHONE HYDROCHLORIDE 2 MG/ML
1 INJECTION INTRAMUSCULAR; INTRAVENOUS; SUBCUTANEOUS EVERY 4 HOURS
Qty: 0 | Refills: 0 | Status: DISCONTINUED | OUTPATIENT
Start: 2018-06-28 | End: 2018-07-01

## 2018-06-28 RX ORDER — CHLORHEXIDINE GLUCONATE 213 G/1000ML
1 SOLUTION TOPICAL DAILY
Qty: 0 | Refills: 0 | Status: DISCONTINUED | OUTPATIENT
Start: 2018-06-28 | End: 2018-07-01

## 2018-06-28 RX ADMIN — Medication 650 MILLIGRAM(S): at 23:30

## 2018-06-28 RX ADMIN — Medication 1 APPLICATION(S): at 11:32

## 2018-06-28 RX ADMIN — Medication 650 MILLIGRAM(S): at 06:59

## 2018-06-28 RX ADMIN — Medication 10 MILLIGRAM(S): at 17:35

## 2018-06-28 RX ADMIN — HYDROMORPHONE HYDROCHLORIDE 1 MILLIGRAM(S): 2 INJECTION INTRAMUSCULAR; INTRAVENOUS; SUBCUTANEOUS at 17:35

## 2018-06-28 RX ADMIN — HYDROMORPHONE HYDROCHLORIDE 0.25 MILLIGRAM(S): 2 INJECTION INTRAMUSCULAR; INTRAVENOUS; SUBCUTANEOUS at 10:53

## 2018-06-28 RX ADMIN — INSULIN HUMAN 1: 100 INJECTION, SOLUTION SUBCUTANEOUS at 07:00

## 2018-06-28 RX ADMIN — LEVETIRACETAM 500 MILLIGRAM(S): 250 TABLET, FILM COATED ORAL at 13:56

## 2018-06-28 RX ADMIN — HYDROMORPHONE HYDROCHLORIDE 0.25 MILLIGRAM(S): 2 INJECTION INTRAMUSCULAR; INTRAVENOUS; SUBCUTANEOUS at 01:49

## 2018-06-28 RX ADMIN — Medication 650 MILLIGRAM(S): at 07:00

## 2018-06-28 RX ADMIN — MODAFINIL 100 MILLIGRAM(S): 200 TABLET ORAL at 11:31

## 2018-06-28 RX ADMIN — Medication 5 MILLIGRAM(S): at 06:59

## 2018-06-28 RX ADMIN — HYDROMORPHONE HYDROCHLORIDE 1 MILLIGRAM(S): 2 INJECTION INTRAMUSCULAR; INTRAVENOUS; SUBCUTANEOUS at 17:50

## 2018-06-28 RX ADMIN — INSULIN HUMAN 15 UNIT(S): 100 INJECTION, SOLUTION SUBCUTANEOUS at 13:05

## 2018-06-28 RX ADMIN — INSULIN GLARGINE 7 UNIT(S): 100 INJECTION, SOLUTION SUBCUTANEOUS at 22:31

## 2018-06-28 RX ADMIN — Medication 5 MILLIGRAM(S): at 17:35

## 2018-06-28 RX ADMIN — HYDROMORPHONE HYDROCHLORIDE 0.25 MILLIGRAM(S): 2 INJECTION INTRAMUSCULAR; INTRAVENOUS; SUBCUTANEOUS at 02:05

## 2018-06-28 RX ADMIN — INSULIN HUMAN 1: 100 INJECTION, SOLUTION SUBCUTANEOUS at 13:06

## 2018-06-28 RX ADMIN — Medication 10 MILLIGRAM(S): at 06:58

## 2018-06-28 RX ADMIN — HYDROMORPHONE HYDROCHLORIDE 1 MILLIGRAM(S): 2 INJECTION INTRAMUSCULAR; INTRAVENOUS; SUBCUTANEOUS at 13:26

## 2018-06-28 RX ADMIN — Medication 4 MILLILITER(S): at 17:35

## 2018-06-28 RX ADMIN — CHLORHEXIDINE GLUCONATE 1 APPLICATION(S): 213 SOLUTION TOPICAL at 11:33

## 2018-06-28 RX ADMIN — HYDROMORPHONE HYDROCHLORIDE 1 MILLIGRAM(S): 2 INJECTION INTRAMUSCULAR; INTRAVENOUS; SUBCUTANEOUS at 22:19

## 2018-06-28 RX ADMIN — MIDODRINE HYDROCHLORIDE 10 MILLIGRAM(S): 2.5 TABLET ORAL at 22:19

## 2018-06-28 RX ADMIN — INSULIN HUMAN 15 UNIT(S): 100 INJECTION, SOLUTION SUBCUTANEOUS at 06:59

## 2018-06-28 RX ADMIN — Medication 4 MILLILITER(S): at 06:59

## 2018-06-28 RX ADMIN — Medication 650 MILLIGRAM(S): at 22:19

## 2018-06-28 RX ADMIN — MIDODRINE HYDROCHLORIDE 10 MILLIGRAM(S): 2.5 TABLET ORAL at 13:06

## 2018-06-28 RX ADMIN — INSULIN HUMAN 15 UNIT(S): 100 INJECTION, SOLUTION SUBCUTANEOUS at 00:45

## 2018-06-28 RX ADMIN — HYDROMORPHONE HYDROCHLORIDE 1 MILLIGRAM(S): 2 INJECTION INTRAMUSCULAR; INTRAVENOUS; SUBCUTANEOUS at 23:30

## 2018-06-28 RX ADMIN — HYDROMORPHONE HYDROCHLORIDE 1 MILLIGRAM(S): 2 INJECTION INTRAMUSCULAR; INTRAVENOUS; SUBCUTANEOUS at 13:11

## 2018-06-28 RX ADMIN — Medication 650 MILLIGRAM(S): at 13:26

## 2018-06-28 RX ADMIN — HYDROMORPHONE HYDROCHLORIDE 0.25 MILLIGRAM(S): 2 INJECTION INTRAMUSCULAR; INTRAVENOUS; SUBCUTANEOUS at 07:15

## 2018-06-28 RX ADMIN — MIDODRINE HYDROCHLORIDE 10 MILLIGRAM(S): 2.5 TABLET ORAL at 06:59

## 2018-06-28 RX ADMIN — SCOPALAMINE 1 PATCH: 1 PATCH, EXTENDED RELEASE TRANSDERMAL at 11:31

## 2018-06-28 RX ADMIN — HYDROMORPHONE HYDROCHLORIDE 0.25 MILLIGRAM(S): 2 INJECTION INTRAMUSCULAR; INTRAVENOUS; SUBCUTANEOUS at 10:38

## 2018-06-28 RX ADMIN — Medication 650 MILLIGRAM(S): at 13:11

## 2018-06-28 RX ADMIN — HYDROMORPHONE HYDROCHLORIDE 0.25 MILLIGRAM(S): 2 INJECTION INTRAMUSCULAR; INTRAVENOUS; SUBCUTANEOUS at 06:59

## 2018-06-28 NOTE — PROGRESS NOTE ADULT - ASSESSMENT
63M PMH HFrEF 10-15% (ischemic), MI, s/p AICD vs PPM, possible Afib, HTN, DM2 on insulin, possible CKD, and gout, who presented with a chief complaint of generalized weakness (3/10/2018) s/p septic shock likely 2/2 LE cellulitis complicated by ATN requiring dialysis. Course complicated by AMS likely from brainstem infarct 2/2 LV thrombus vs toxic metabolic encephalopathy. Further complicated by development of candidemia and sepsis 2/2 klebsiella bacteremia s/p fluconazole and CTX, now resolved. s/p completed course of Zosyn for aspiration PNA.  Goals of care discussion for which patient made DNR, but with continued escalation of care. Continues to undergo management for respiratory failure with weaning as tolerated with CPAP, trach collar trials. With complications of hypotension, tolerating HD w/o albumin. Now with unsteageable incurable sacral wound with decision for comfort measures. 63M PMH HFrEF 10-15% (ischemic), MI, s/p AICD vs PPM, possible Afib, HTN, DM2 on insulin, possible CKD, and gout, who presented with a chief complaint of generalized weakness (3/10/2018) s/p septic shock likely 2/2 LE cellulitis complicated by ATN requiring dialysis. Course complicated by AMS likely from brainstem infarct 2/2 LV thrombus vs toxic metabolic encephalopathy. Further complicated by development of candidemia and sepsis 2/2 klebsiella bacteremia s/p fluconazole and CTX, now resolved. s/p completed course of Zosyn for aspiration PNA.  Goals of care discussion for which patient made DNR, but with continued escalation of care. Continues to undergo management for respiratory failure with weaning as tolerated with CPAP, trach collar trials. Patient does well with CPAP, but fails trach collar trials. With complications of hypotension (90s/60s), tolerating HD w/o albumin. Now with unstageable incurable sacral wound with decision for comfort measures only.

## 2018-06-28 NOTE — ADVANCED PRACTICE NURSE CONSULT - ASSESSMENT
63M PMH HFrEF 10-15% (ischemic), MI, s/p AICD vs PPM, possible Afib, HTN, DM2 on insulin, possible CKD, and gout, who presented with a chief complaint of generalized weakness s/p septic shock likely 2/2 LE cellulitis complicated by ATN requiring dialysis. Course also c/b AMS likely from brainstem infarct 2/2 LV thrombus and/or toxic metabolic encephalopathy and found to have candidemia and sepsis 2/2 klebsiella bacteremia s/p fluconazole and CTX and de-lining. Pending dispo for L-TACH at new baseline mental status. Pt on hep bridge to coumadin for afib and LV thrombus. Currently ventilator dependent tolerating daily CPAP trials and with resolved septic shock from aspiration pneumonia. Pt has a stage 2 pressure injury to sacral/left buttock measuring 5x4 cm with pink wound bed, appears to have started as a DTI due to the dark red area in middle and appearance of peeled away skin at edges. Mepilex foam dressing in place beneath trach for prevention. Silvadene being applied to necrotic area over right forearm.
63M PMH HFrEF 10-15% (ischemic), MI, s/p AICD vs PPM, possible Afib, HTN, DM2 on insulin, possible CKD, and gout, who presented with a chief complaint of generalized weakness s/p septic shock likely 2/2 LE cellulitis complicated by ATN requiring dialysis. Course also c/b AMS likely from brainstem infarct 2/2 LV thrombus and/or toxic metabolic encephalopathy and found to have candidemia and sepsis 2/2 klebsiella bacteremia s/p fluconazole and CTX and de-lining. Pending dispo for L-TACH at new baseline mental status. Pt on hep bridge to coumadin for afib and LV thrombus. Currently ventilator dependent.   Patient presented with a maladorous unstageable pressure injury to sacral/left buttock measuring approximately 7 cm x 9 cm with fluctuant, soft eschar draining moderate amount of malodorous serosanguinous exudate.   Two unstageable pressure injuries beneath the trach plate; left clavicle wound measuring  0.5 cm x 0.5 cm and right clavicle wound measuring 0.5 cm x 1 cm with small amount of serosanguinous exudate.  Mepilex Lite foam dressing in place beneath trach. Silvadene being applied to soft, fluctuant necrotic area over left forearm. Posterior aspect of left lower leg wound with pale red, dry wound bed measuring 2.5 cm x 0.5 cm.   Patient requires two person assist to turn in bed. Patient on AirTAP positioning system and wearing heel protectors to offload heels.   RNTommy present and assisted during assessment. House staff Dr Barillas and Dr Arciniega also present during assessment.
Sacral pressure injury with 80% grayish colored slough and 20% fluctuant eschar measuring 17 cm x 20 cm with large amount of serosanguinous exudate. Undermining noted circumferentially with deepest area measuring approximately 5 cm at 9 o'clock.   Patient on AirTAP repositioning system with wedges for turning and positioning and heel protectors to offload heels.   Shannon GUTIERREZ present and assisted during assessment.
Sacral ulcer now unstageable with necrotic center in wound bed, subcutaneous tissue visible elsewhere in wound bed with smaller wounds to left of majority of wound that are part of larger site. Small stage 3 ulcer on right buttock. Mepitel lite dressing beneath trach for protection.
Sacral unstageable malodorous pressure injury with 80% grayish colored slough and 20% fluctuant eschar. Undermining noted circumferentially with deepest area measuring approximately 8 cm at 9 o'clock. Unable to measure wound due to patient's clinical instability when turned; wound measurements approximately 15 cm x 18 cm.  LLE posterior aspect wound with 80% adherent eschar and 20% pink, non-granulating tissure measuring 2 cm x 0.5 cm. Skin beneath trach dressing intact, split gauze in place.   Patient total care, requires 2 person assist to turn on AirTAP positioning system with wedges to turn and position every 2 hours and wearing heel protectors to offload heels. RNJessica present and assisted during assessment.  Dr Juárez also present during assessment.   Patient stable upon completion of assessment.

## 2018-06-28 NOTE — PROGRESS NOTE ADULT - ATTENDING COMMENTS
Change pain control to Dilaudid 1mg Q4H per PEG. Maintain on assist control for work of breathing. Cont suctioning for increased secretions.

## 2018-06-28 NOTE — PROGRESS NOTE ADULT - PROBLEM SELECTOR PLAN 7
-continue scopolamine patch for respiratory secretions   -can consider lorazepam 2mg iv q6h prn for agitation, but I would anticipate that pt will become increasingly lethargic as he goes into renal failure and accumulates toxins  -If pts HCP wishes to continue vent support, may need to consider transfer to a vent facility with a hospice support at the facility (rather than [facility]- which will be difficult to access while pt is on feeds and the vent)  -Sw updated as pts HCP requesting applications be sent to [facility] and [facility]

## 2018-06-28 NOTE — PROGRESS NOTE ADULT - PROBLEM SELECTOR PLAN 8
Stable. Likely 2/2 CKD. Iron studies consistent with anemia of chronic disease.   - no transfusions per family CHF with EF 10-15% 2/2 ischemic cardiomyopathy s/p AICD. Patient with persistent pleural effusions on CXR and US and b/l dependent edema.  - HOLDING ACE/BB in setting of hypotension  - c/w midodrine TID  - EP to turn off AICD today per family wishes     #CAD- Hx of MI and ischemic CM s/p AICD, STEMI 7/2017.  - discontinue Aspirin and statin to reduce pill burden

## 2018-06-28 NOTE — PROGRESS NOTE ADULT - PROBLEM SELECTOR PLAN 1
stable use of opioids.   REC: convert to enteral formula due to limited supplies of IV formula. recommend hydromorphone 1mg per PEG q4h atc with 1mg per PEG q2h prn for breakthru pain

## 2018-06-28 NOTE — PROGRESS NOTE ADULT - PROBLEM SELECTOR PLAN 9
CHF with EF 10-15% 2/2 ischemic cardiomyopathy s/p AICD. Patient with persistent pleural effusions on CXR and US and b/l dependent edema.  - HOLDING ACE/BB in setting of hypotension  - c/w midodrine TID  - EP to turn off AICD today per family wishes     #CAD- Hx of MI and ischemic CM s/p AICD, STEMI 7/2017.  - discontinue Aspirin and statin to reduce pill burden VTE: decision for no AC    F: No IVF  E: No repletion of lytes, last HD on Saturday  N: Jevity 1.5 goal rate 68cc/hr  Dispo: family meeting today with decision for discontinuation of abx therapy, comfort measures, no escalation of antibiotics, no MEWs, no escalation of care.   *On Hydromorphone 0.25mg IV q4hrs ATC and Hydromorphone 0.25mg IV q1hr PRN breakthrough VTE: decision for no AC    F: No IVF  E: No repletion of lytes, last hemodialysis on Saturday  N: Jevity 1.5 goal rate 68cc/hr  Dispo: family meeting today with decision for discontinuation of abx therapy, comfort measures, no escalation of antibiotics, no MEWs, no escalation of care.   *On Hydromorphone 0.25mg IV q4hrs ATC and Hydromorphone 0.25mg IV q1hr PRN breakthrough

## 2018-06-28 NOTE — PROGRESS NOTE ADULT - PROBLEM SELECTOR PLAN 1
Resolved. Meeting SIRS criteria with leukocytosis and fever on 6/15. Source likely unsteagable/incurable sacral ulcer with e/o osteo. Was on Vanc and Zosyn, however, in this situation where patient has non-surgical sacral wound and poor prognosis, little utility in continuing abx at this time as patient likely to develop resistance  - antibiotics discontinued as patient has a chronic incurable infection. Discussed at family meeting.   - decision for no escalation of antibiotics, no escalation of care, no MEWS    #Sacral decubitus ulcer- unstageable s/p debridement with plastic surgery. Cultures growing multiple organisms including Pseudomonas and E faecium. Pelvic MRI showing acute osteomyelitis and loculated collection abutting the bladder. Patient is not a surgical candidate based on his comorbidities and inability lay in prone positioning post-op ([id number]).   - discontinued abx as above   - pain control w Dilaudid 0.25mg q4hrs and 0.25mg q1hr PRN   - frequent turning q2h for pressure offloading, air fluid mattress  - wound care with dry dressings with Allevyn over top  - soilage control with rectal tube    #UTI- Patient with urinary retention requiring chronic indwelling Jose catheter. Urine cultures growing Sherrie Dubliniensis sensitive to Fluconazole.  - s/p Fluconazole

## 2018-06-28 NOTE — PROGRESS NOTE ADULT - SUBJECTIVE AND OBJECTIVE BOX
Palliative Medicine  following  this 63 year old male who has been hospitalized over 3 months.  Pt was admitted with cellulitis in March 2018.  His course was complicated by septic shock, cardiogenic shock, renal failure, respiratory failure, multiple infections and cva with seizure activity.  Pt remains mechanically vented, hemodialysis dependant, completely disabled requiring complete care for ADLs.  He is fed enterally through a PEG tube. Pt has developed significant large decubitus ulcer which is concerning for osteomyelitis (work up in progress). As pt is fecally incontinent, he continues to contaminate the wound with fecal matter.    Interval history:  stable.   last HD was Saturday June 23. last labs on June 23 (WB of 19).  remains with leukocytosis. Stable unstageable large sacral wound with tunnelling and foul odor.   Antibiotics have completed.  Pt noted to be more awake over the weekend, and denying pain, thus did not receive the scheduled pain medications.     Past 24 hours-  pt largely stable,.   On CPAP trial today but became very tachypneic. Rate in the 30s.  PT appeared distressed but denied pain, anxiety, etc.  With suctioning of pts trach, his RR decreased to a more regular pattern and rate. No difficulty passing hte suction cath.    PAIN (0-10):  0/10  DYSPNEA (Y/N):  no  NAUSEA/VOMITING (Y/N):  no  SECRETIONS (Y/N):  yes  AGITATION (Y/N): no    OTHER REVIEW OF SYSTEMS:  UNABLE TO OBTAIN  due to:  altered mental status      Vital Signs Last 24 Hrs  T(C): 37.7 (28 Jun 2018 15:32), Max: 37.7 (28 Jun 2018 15:32)  T(F): 99.8 (28 Jun 2018 15:32), Max: 99.8 (28 Jun 2018 15:32)  HR: 104 (28 Jun 2018 15:32) (75 - 108)  BP: 101/73 (28 Jun 2018 15:32) (95/66 - 107/67)  BP(mean): --  RR: 20 (28 Jun 2018 15:32) (20 - 33)  SpO2: 100% (28 Jun 2018 15:32) (98% - 100%)    GENERAL:  , chronically ill appearing. thin and frail, not distressed  HEENT:  NC/AT, normocephalic, sclera anicteric, temporal wasting  NECK:    supple, no JVD  CVS:     reg, + left chest wall PPM, R sided perm cath  RESP:  vented, course, + mucoid secretions with suctioning,  sonorous, rhoncerous breath sounds, on AC- with CPAP trials but not well toelrated today  GI:    + PEG tube, flat, nontender, +BS  :    aneuric, on HD, no barnes  Rectal:  rectal tube in place, + liquid stool draining  MUSC:   no movement to Bilat LE, moving bilat UE against gravity, intermetacarpal  wasting  NEURO:   arousable, not as engaged today  PSYCH:    maritza       SKIN:    specific observations per wound care nurse  LYMPH:      neg  	                   OPIATE NAÏVE (Y/N):  yes  ALLERGIES: No Known Allergies    MEDICATIONS  (STANDING):  acetaminophen    Suspension. 650 milliGRAM(s) Oral every 8 hours  acetylcysteine 20% Inhalation 4 milliLiter(s) Inhalation two times a day  chlorhexidine 2% Cloths 1 Application(s) Topical daily  collagenase Ointment 1 Application(s) Topical daily  hydrocortisone 10 milliGRAM(s) Oral every 12 hours  HYDROmorphone   Tablet 1 milliGRAM(s) Enteral Tube every 4 hours  insulin glargine Injectable (LANTUS) 7 Unit(s) SubCutaneous at bedtime  insulin regular  human corrective regimen sliding scale   SubCutaneous <User Schedule>  insulin regular  human recombinant 15 Unit(s) SubCutaneous every 6 hours  levETIRAcetam  Solution 500 milliGRAM(s) Oral every 24 hours  metoclopramide 5 milliGRAM(s) Oral every 12 hours  midodrine 10 milliGRAM(s) Oral every 8 hours  modafinil 100 milliGRAM(s) Oral daily  scopolamine   Patch 1 Patch Transdermal every 3 days    MEDICATIONS  (PRN):      LABS REVIEWED  last lab draw 6/23                IMAGING REVIEWED:  n/a  	  ADVANCED DIRECTIVES:    none    Decision Maker:  pts HCP is Cristal [place]   [phone]  Goals of care: comfort focused    PSYCHOSOCIAL-SPIRITUAL ASSESSMENT:        Pt is followed by his personal friend who is a . Pt is of a Evangelical persuasion         GOALS OF CARE DISCUSSION:       Palliative care info/counseling provided	           Family meeting-- none scheduled as of now.        Advanced Directives; DNR as of May 2018      	        REFERRALS:        Palliative Med        Unit SW/Case Mgmt              Patient/Family Support-- for support of pts Omar bautista age 5 yo       Ethics

## 2018-06-28 NOTE — ADVANCED PRACTICE NURSE CONSULT - REASON FOR CONSULT
[facility] nurse consult to assess sacral pressure ulcer (injury). Patient had been evaluated by plastics on 6/8. Spoke with house staff, Dr Arellano, who will follow up with plastics to determine if team will re-evaluate patient.
Pressure injury
[facility] nurse consult completed 6/27 to re-assess sacral pressure (ulcer) injury. Unable to document due to mainframe issue.
[facility] nurse consult to assess sacral pressure ulcer (injury) and posterior aspect of left lower leg.
[facility] nurse consult to assess venous ulcers. Patient evaluated by vascular team. WOCN consult not indicated. Informed house staff, Dr Zamarripa of the same.
[facility] nurse consult to re-assess sacral pressure ulcer (injury). Patient on dialysis. Unable to assess. Will follow up.
re-assessment of sacral ulcer
WOC nurse to re-assess sacral pressure ulcer (injury) and LLE posterior aspect wound.

## 2018-06-28 NOTE — PROGRESS NOTE ADULT - PROBLEM SELECTOR PLAN 3
Pt did not tolerate CPAP trial today.  Pt appears more congested than in days previous. Perhaps he is starting to accumulate fluid with the cessation of HD?  monitor closely for s/s of worsening resp failure, more importantly, worsening SOB or increased work of breathing.  Pt's HCP is not agreeable to removing ventilatory support at this time.    Continue CPAP trials at this time  Pt's HCP agreeable to transferring pt to a [facility] facility with hospice care in place due to pts overall poor prognosis.

## 2018-06-28 NOTE — PROGRESS NOTE ADULT - SUBJECTIVE AND OBJECTIVE BOX
PGY1 PROGRESS NOTE:    HOSPITAL COURSE:  63M PMH HFrEF 10-15% (ischemic), CAD (STEMI per [facility] records 7/17), s/p AICD, Afib, HTN, DM2 (on insulin), CKD, gout admitted for septic shock 2/2 LE cellulitis as well as concern for ATN in setting of shock. Patient was admitted, started on broad spectrum antibiotics and started on pressors for presumed septic shock. However given low mix venous saturation there was c/f component of cardiogenic shock and pt was given inotropes however this was d/c due to tachycardia. He received an extensive infectious w/u which included a gallium scan c/w LLE cellulitis and a TTE w/o vegetations. Patient’s course was c/b worsening renal function and eventual anuria, for which renal was consulted. HD cath was placed and pt began CVVD and eventually transitioned to intermittent HD. His course was further c/b b/l pulmonary effusions requiring R chest tube placement. Given extensive cardiac hx and component of cardiogenic shock, cardiology was consulted and pt started on vasopressin for blood pressure and afterload reducers however unable to tolerate afterload reduction. Transient c/f HIT given platelet drop while on hep gtt, however ruled out and pt transitioned back to hep gtt and then bridged to Coumadin for AFib AC. Patient’s course was further c/b stroke when patient became unresponsive with decreased use of L side (stroke code -- CT negative, MR showing chronic infarcts and possible small brainstem stroke) requiring intubation for airway protection. His mental status did not improve after starting modafinil, vEEG done to r/o seizures showing slowing but no epileptiform discharges. Patient trach’d and PEG’d because unable to wean off vent and unable to mentate enough to swallow. Pt later found to be fungemic with candida tropicalis and treated with micafungin then narrowed to fluconazole. RUKHSANA at that time negative for vegetations or AICD infection. CT A/P performed to evaluate source which was unrevealing. HD cath removed and replaced after surveillance cultures remained negative. Course further complicated by Klebsiella bacteremia, treated with meropenem then de-escalated to Zosyn and de-escalated further to CTX. Upon weaning from pressors, pt started on hydrocortisone and midodrine. Pt with persistent fevers, HD cath again removed 4/19 and replaced with temporary HD cath. Patient then noted to have seizure like activity and started on Keppra. Pt also subsequently found to have septic shock 2/2 aspiration PNA, s/p treatment with Zosyn and pressors. Pt again weaned off pressors to stress dose steroids and midodrine for adrenal insufficiency. Patient unable to be weaned to trach collar and continued on mechanical ventilation. HD was continued on HD and eventually weaned off of albumin with end goal of L-TACH. Patient made DNR with further discussion with family regarding goals of care- but continued escalation of care, with pressors, etc. Course then c/b sepsis 2/2 candidal UTI and sacral decub ulcer with e/o osteomyelitis and abscess. Plastic surgery involved and debrided ulcer, but assessment was that ulcer was unstageable and incurable. Patient was deemed not a surgical candidate based on his comorbidities and inability lay in prone positioning post-op ([id number]). Multidisciplinary family meeting held involving social work, medical team, nursing staff to discuss utility in continuation of abx therapy given the complexity of the patients condition and poor prognosis. Family shared that they understand that pt is not improving despite all effort and made the decision to pursue comfort measures including no MEWS, escalation of care or continuation of antibiotic therapy. AICD was turned off, and antibiotics discontinued. Patient received last HD on Saturday 6/23/18. Now on Dilaudid ATC for comfort and pain. Attempts made to wean patient to trach collar daily for potential placement in hospice facility for remainder of days.    CC: septic Shock due to bilateral cellulitis - course complicated by renal failure (on hemodialysis), stroke, and respiratory failure. (01 Jun 2018 14:51)    OVERNIGHT EVENTS: ELSIE.     SUBJECTIVE / INTERVAL HPI:  Awake but intermittently responding to questions via head nods. Following some commands. Patient not in any pain and is comfortable.     ROS: negative unless otherwise specified    VITAL SIGNS:  Vital Signs Last 24 Hrs  T(C): 36.9 (28 Jun 2018 05:43), Max: 37.8 (27 Jun 2018 08:48)  T(F): 98.5 (28 Jun 2018 05:43), Max: 100 (27 Jun 2018 08:48)  HR: 98 (28 Jun 2018 05:43) (75 - 108)  BP: 95/66 (28 Jun 2018 05:43) (84/63 - 107/67)  BP(mean): --  RR: 28 (28 Jun 2018 05:43) (17 - 33)  SpO2: 100% (28 Jun 2018 05:43) (98% - 100%)    PHYSICAL EXAM:  Constitutional: awake resting in bed, thin/frail appearing, NAD   HEENT: temporal wasting, PERRLA, EOMI, dry MM  Neck: No JVD, tracheostomy with mild mucoid discharge  Respiratory: coarse breath sounds b/l, rhonchi b/l  Cardiovascular: AICD in place, normal S1S2, no m/r/g  Gastrointestinal: soft, nontender, nondistended. +BS, PEG tube in place. Rectal tube in place draining brown-watery stool  Extremities: hyperpigmented skin on anterior shin RLE, trace dependent edema, hands in braces to prevent contracture  Musculoskeletal: increased rigidity in UE, atrophied musculature in b/l LE  Vascular: 1+ peripheral pulses  Neurological: non-verbal, responding intermittently via head nod, not following commands, moving all extremities, no focal deficits  Skin: Large [id number] unstageable sacral ulcer with exposed necrotic tissue and muscles and tracking underneath in all directions, foul smelling, black eschar overlying tissue, non-bleeding, no purulent material    MEDICATIONS:  MEDICATIONS  (STANDING):  acetaminophen    Suspension. 650 milliGRAM(s) Oral every 8 hours  acetylcysteine 20% Inhalation 4 milliLiter(s) Inhalation two times a day  aspirin  chewable 81 milliGRAM(s) Oral daily  collagenase Ointment 1 Application(s) Topical daily  escitalopram 5 milliGRAM(s) Oral daily  hydrocortisone 10 milliGRAM(s) Oral every 12 hours  HYDROmorphone  Injectable 0.25 milliGRAM(s) IV Push every 4 hours  insulin glargine Injectable (LANTUS) 7 Unit(s) SubCutaneous at bedtime  insulin regular  human corrective regimen sliding scale   SubCutaneous <User Schedule>  insulin regular  human recombinant 15 Unit(s) SubCutaneous every 6 hours  levETIRAcetam  Solution 500 milliGRAM(s) Oral every 24 hours  metoclopramide 5 milliGRAM(s) Oral every 12 hours  midodrine 10 milliGRAM(s) Oral every 8 hours  modafinil 100 milliGRAM(s) Oral daily  scopolamine   Patch 1 Patch Transdermal every 3 days      ALLERGIES:  No Known Allergies      LABS:       No Labs        RADIOLOGY & ADDITIONAL TESTS: Reviewed. PGY1 PROGRESS NOTE:    HOSPITAL COURSE:  63M PMH HFrEF 10-15% (ischemic), CAD (STEMI per [facility] records 7/17), s/p AICD, Afib, HTN, DM2 (on insulin), CKD, gout admitted for septic shock 2/2 LE cellulitis as well as concern for ATN in setting of shock. Patient was admitted, started on broad spectrum antibiotics and started on pressors for presumed septic shock. However given low mix venous saturation there was c/f component of cardiogenic shock and pt was given inotropes however this was d/c due to tachycardia. He received an extensive infectious w/u which included a gallium scan c/w LLE cellulitis and a TTE w/o vegetations. Patient’s course was c/b worsening renal function and eventual anuria, for which renal was consulted. HD cath was placed and pt began CVVD and eventually transitioned to intermittent HD. His course was further c/b b/l pulmonary effusions requiring R chest tube placement. Given extensive cardiac hx and component of cardiogenic shock, cardiology was consulted and pt started on vasopressin for blood pressure and afterload reducers however unable to tolerate afterload reduction. Transient c/f HIT given platelet drop while on hep gtt, however ruled out and pt transitioned back to hep gtt and then bridged to Coumadin for AFib AC. Patient’s course was further c/b stroke when patient became unresponsive with decreased use of L side (stroke code -- CT negative, MR showing chronic infarcts and possible small brainstem stroke) requiring intubation for airway protection. His mental status did not improve after starting modafinil, vEEG done to r/o seizures showing slowing but no epileptiform discharges. Patient trach’d and PEG’d because unable to wean off vent and unable to mentate enough to swallow. Pt later found to be fungemic with candida tropicalis and treated with micafungin then narrowed to fluconazole. RUKHSANA at that time negative for vegetations or AICD infection. CT A/P performed to evaluate source which was unrevealing. HD cath removed and replaced after surveillance cultures remained negative. Course further complicated by Klebsiella bacteremia, treated with meropenem then de-escalated to Zosyn and de-escalated further to CTX. Upon weaning from pressors, pt started on hydrocortisone and midodrine. Pt with persistent fevers, HD cath again removed 4/19 and replaced with temporary HD cath. Patient then noted to have seizure like activity and started on Keppra. Pt also subsequently found to have septic shock 2/2 aspiration PNA, s/p treatment with Zosyn and pressors. Pt again weaned off pressors to stress dose steroids and midodrine for adrenal insufficiency. Patient unable to be weaned to trach collar and continued on mechanical ventilation. HD was continued on HD and eventually weaned off of albumin with end goal of L-TACH. Patient made DNR with further discussion with family regarding goals of care- but continued escalation of care, with pressors, etc. Course then c/b sepsis 2/2 candidal UTI and sacral decub ulcer with e/o osteomyelitis and abscess. Plastic surgery involved and debrided ulcer, but assessment was that ulcer was unstageable and incurable. Patient was deemed not a surgical candidate based on his comorbidities and inability lay in prone positioning post-op ([id number]). Multidisciplinary family meeting held involving social work, medical team, nursing staff to discuss utility in continuation of abx therapy given the complexity of the patients condition and poor prognosis. Family shared that they understand that pt is not improving despite all effort and made the decision to pursue comfort measures including no MEWS, escalation of care or continuation of antibiotic therapy. AICD was turned off, and antibiotics discontinued. Patient received last HD on Saturday 6/23/18. Now on Dilaudid ATC for comfort and pain. Attempts made to wean patient to trach collar daily for potential placement in hospice facility for remainder of days.    CC: septic Shock due to bilateral cellulitis - course complicated by renal failure (on hemodialysis), stroke, and respiratory failure. (01 Jun 2018 14:51)    OVERNIGHT EVENTS: ELSIE.     SUBJECTIVE / INTERVAL HPI:  Patient was seen and examined at bedside this AM.  Awake, resting comfortably, not in pain and responds/follows commands with head nodding and hand squeezes.     ROS: negative unless otherwise specified    VITAL SIGNS:  Vital Signs Last 24 Hrs  T(C): 36.9 (28 Jun 2018 05:43), Max: 37.8 (27 Jun 2018 08:48)  T(F): 98.5 (28 Jun 2018 05:43), Max: 100 (27 Jun 2018 08:48)  HR: 98 (28 Jun 2018 05:43) (75 - 108)  BP: 95/66 (28 Jun 2018 05:43) (84/63 - 107/67)  BP(mean): --  RR: 28 (28 Jun 2018 05:43) (17 - 33)  SpO2: 100% (28 Jun 2018 05:43) (98% - 100%)    PHYSICAL EXAM:  Constitutional: awake resting in bed, thin/frail appearing, NAD   HEENT: temporal wasting, PERRLA, EOMI, dry MM  Neck: No JVD, tracheostomy with mild mucoid discharge  Respiratory: coarse breath sounds b/l, rhonchi b/l  Cardiovascular: normal S1S2, no m/r/g  Gastrointestinal: soft, nontender, nondistended. +BS, PEG tube in place. Rectal tube in place draining brown-watery stool  Extremities: hyperpigmented skin on anterior shin RLE, trace dependent edema, boots in place  Musculoskeletal: increased rigidity in UE, atrophied musculature in b/l LE  Vascular: 1+ peripheral pulses  Neurological: non-verbal, responding with head nodding and hand squeezes, following some commands, moving all extremities, no focal deficits  Skin: Large [id number] unstageable sacral ulcer with exposed necrotic tissue and muscles and tracking underneath in all directions, foul smelling, black eschar overlying tissue, non-bleeding, no purulent material    MEDICATIONS:  MEDICATIONS  (STANDING):  acetaminophen    Suspension. 650 milliGRAM(s) Oral every 8 hours  acetylcysteine 20% Inhalation 4 milliLiter(s) Inhalation two times a day  aspirin  chewable 81 milliGRAM(s) Oral daily  collagenase Ointment 1 Application(s) Topical daily  escitalopram 5 milliGRAM(s) Oral daily  hydrocortisone 10 milliGRAM(s) Oral every 12 hours  HYDROmorphone  Injectable 0.25 milliGRAM(s) IV Push every 4 hours  insulin glargine Injectable (LANTUS) 7 Unit(s) SubCutaneous at bedtime  insulin regular  human corrective regimen sliding scale   SubCutaneous <User Schedule>  insulin regular  human recombinant 15 Unit(s) SubCutaneous every 6 hours  levETIRAcetam  Solution 500 milliGRAM(s) Oral every 24 hours  metoclopramide 5 milliGRAM(s) Oral every 12 hours  midodrine 10 milliGRAM(s) Oral every 8 hours  modafinil 100 milliGRAM(s) Oral daily  scopolamine   Patch 1 Patch Transdermal every 3 days      ALLERGIES:  No Known Allergies      LABS:   No Labs        RADIOLOGY & ADDITIONAL TESTS: Reviewed.

## 2018-06-28 NOTE — PROGRESS NOTE ADULT - PROBLEM SELECTOR PLAN 2
Acute respiratory failure in setting of septic and cardiogenic shock, with trach placed on 4/5/18 for persistent, now chronic respiratory failure.   - Dilaudid 0.25mg IV q1hr PRN for tachypnea for comfort  - scopolamine PRN secretions  - CPAP trials yesterday and today successful.   - trach collar trial today Acute respiratory failure in setting of septic and cardiogenic shock, with trach placed on 4/5/18 for persistent, now chronic respiratory failure.   - Dilaudid 0.25mg IV q1hr PRN for tachypnea for comfort  - scopolamine PRN secretions  - tolerating CPAP at night w/ PRN suctioning  - trach collar trial today

## 2018-06-29 LAB
GLUCOSE BLDC GLUCOMTR-MCNC: 219 MG/DL — HIGH (ref 70–99)
GLUCOSE BLDC GLUCOMTR-MCNC: 228 MG/DL — HIGH (ref 70–99)
GLUCOSE BLDC GLUCOMTR-MCNC: 314 MG/DL — HIGH (ref 70–99)
GLUCOSE BLDC GLUCOMTR-MCNC: 322 MG/DL — HIGH (ref 70–99)
GLUCOSE BLDC GLUCOMTR-MCNC: 95 MG/DL — SIGNIFICANT CHANGE UP (ref 70–99)

## 2018-06-29 RX ADMIN — MIDODRINE HYDROCHLORIDE 10 MILLIGRAM(S): 2.5 TABLET ORAL at 14:45

## 2018-06-29 RX ADMIN — Medication 650 MILLIGRAM(S): at 14:45

## 2018-06-29 RX ADMIN — HYDROMORPHONE HYDROCHLORIDE 1 MILLIGRAM(S): 2 INJECTION INTRAMUSCULAR; INTRAVENOUS; SUBCUTANEOUS at 06:27

## 2018-06-29 RX ADMIN — HYDROMORPHONE HYDROCHLORIDE 1 MILLIGRAM(S): 2 INJECTION INTRAMUSCULAR; INTRAVENOUS; SUBCUTANEOUS at 23:00

## 2018-06-29 RX ADMIN — Medication 10 MILLIGRAM(S): at 06:26

## 2018-06-29 RX ADMIN — Medication 650 MILLIGRAM(S): at 14:44

## 2018-06-29 RX ADMIN — CHLORHEXIDINE GLUCONATE 1 APPLICATION(S): 213 SOLUTION TOPICAL at 11:28

## 2018-06-29 RX ADMIN — HYDROMORPHONE HYDROCHLORIDE 1 MILLIGRAM(S): 2 INJECTION INTRAMUSCULAR; INTRAVENOUS; SUBCUTANEOUS at 17:36

## 2018-06-29 RX ADMIN — INSULIN HUMAN 15 UNIT(S): 100 INJECTION, SOLUTION SUBCUTANEOUS at 18:55

## 2018-06-29 RX ADMIN — Medication 650 MILLIGRAM(S): at 23:15

## 2018-06-29 RX ADMIN — INSULIN HUMAN 4: 100 INJECTION, SOLUTION SUBCUTANEOUS at 12:06

## 2018-06-29 RX ADMIN — Medication 5 MILLIGRAM(S): at 17:36

## 2018-06-29 RX ADMIN — HYDROMORPHONE HYDROCHLORIDE 1 MILLIGRAM(S): 2 INJECTION INTRAMUSCULAR; INTRAVENOUS; SUBCUTANEOUS at 03:00

## 2018-06-29 RX ADMIN — HYDROMORPHONE HYDROCHLORIDE 1 MILLIGRAM(S): 2 INJECTION INTRAMUSCULAR; INTRAVENOUS; SUBCUTANEOUS at 14:45

## 2018-06-29 RX ADMIN — HYDROMORPHONE HYDROCHLORIDE 1 MILLIGRAM(S): 2 INJECTION INTRAMUSCULAR; INTRAVENOUS; SUBCUTANEOUS at 14:44

## 2018-06-29 RX ADMIN — HYDROMORPHONE HYDROCHLORIDE 1 MILLIGRAM(S): 2 INJECTION INTRAMUSCULAR; INTRAVENOUS; SUBCUTANEOUS at 23:15

## 2018-06-29 RX ADMIN — MIDODRINE HYDROCHLORIDE 10 MILLIGRAM(S): 2.5 TABLET ORAL at 23:04

## 2018-06-29 RX ADMIN — Medication 650 MILLIGRAM(S): at 23:00

## 2018-06-29 RX ADMIN — Medication 650 MILLIGRAM(S): at 06:26

## 2018-06-29 RX ADMIN — Medication 4 MILLILITER(S): at 17:36

## 2018-06-29 RX ADMIN — MIDODRINE HYDROCHLORIDE 10 MILLIGRAM(S): 2.5 TABLET ORAL at 06:27

## 2018-06-29 RX ADMIN — Medication 5 MILLIGRAM(S): at 06:26

## 2018-06-29 RX ADMIN — INSULIN GLARGINE 7 UNIT(S): 100 INJECTION, SOLUTION SUBCUTANEOUS at 22:47

## 2018-06-29 RX ADMIN — HYDROMORPHONE HYDROCHLORIDE 1 MILLIGRAM(S): 2 INJECTION INTRAMUSCULAR; INTRAVENOUS; SUBCUTANEOUS at 02:08

## 2018-06-29 RX ADMIN — Medication 1 APPLICATION(S): at 11:28

## 2018-06-29 RX ADMIN — HYDROMORPHONE HYDROCHLORIDE 1 MILLIGRAM(S): 2 INJECTION INTRAMUSCULAR; INTRAVENOUS; SUBCUTANEOUS at 09:32

## 2018-06-29 RX ADMIN — Medication 10 MILLIGRAM(S): at 17:36

## 2018-06-29 RX ADMIN — LEVETIRACETAM 500 MILLIGRAM(S): 250 TABLET, FILM COATED ORAL at 14:44

## 2018-06-29 RX ADMIN — MODAFINIL 100 MILLIGRAM(S): 200 TABLET ORAL at 11:31

## 2018-06-29 RX ADMIN — INSULIN HUMAN 4: 100 INJECTION, SOLUTION SUBCUTANEOUS at 18:54

## 2018-06-29 RX ADMIN — INSULIN HUMAN 15 UNIT(S): 100 INJECTION, SOLUTION SUBCUTANEOUS at 00:58

## 2018-06-29 RX ADMIN — Medication 4 MILLILITER(S): at 06:26

## 2018-06-29 NOTE — PROGRESS NOTE ADULT - PROBLEM SELECTOR PLAN 1
Resolved. Meeting SIRS criteria with leukocytosis and fever on 6/15. Source likely unsteagable/incurable sacral ulcer with e/o osteo. Was on Vanc and Zosyn, however, in this situation where patient has non-surgical sacral wound and poor prognosis, little utility in continuing abx at this time as patient likely to develop resistance  - antibiotics discontinued as patient has a chronic incurable infection. Discussed at family meeting.   - decision for no escalation of antibiotics, no escalation of care, no MEWS    #Sacral decubitus ulcer- unstageable s/p debridement with plastic surgery. Cultures growing multiple organisms including Pseudomonas and E faecium. Pelvic MRI showing acute osteomyelitis and loculated collection abutting the bladder. Patient is not a surgical candidate based on his comorbidities and inability lay in prone positioning post-op ([id number]).   - discontinued abx as above   - pain control w Dilaudid 0.25mg q4hrs and 0.25mg q1hr PRN   - frequent turning q2h for pressure offloading, air fluid mattress  - wound care with dry dressings with Allevyn over top  - soilage control with rectal tube    #UTI- Patient with urinary retention requiring chronic indwelling Jose catheter. Urine cultures growing Sherrie Dubliniensis sensitive to Fluconazole.  - s/p Fluconazole

## 2018-06-29 NOTE — PROGRESS NOTE ADULT - PROBLEM SELECTOR PLAN 9
VTE: decision for no AC    F: No IVF  E: No repletion of lytes, last hemodialysis on Saturday  N: Jevity 1.5 goal rate 68cc/hr  Dispo: family meeting today with decision for discontinuation of abx therapy, comfort measures, no escalation of antibiotics, no MEWs, no escalation of care.   *On Hydromorphone 0.25mg IV q4hrs ATC and Hydromorphone 0.25mg IV q1hr PRN breakthrough

## 2018-06-29 NOTE — PROGRESS NOTE ADULT - SUBJECTIVE AND OBJECTIVE BOX
PGY1 PROGRESS NOTE:    HOSPITAL COURSE:  63M PMH HFrEF 10-15% (ischemic), CAD (STEMI per [facility] records 7/17), s/p AICD, Afib, HTN, DM2 (on insulin), CKD, gout admitted for septic shock 2/2 LE cellulitis as well as concern for ATN in setting of shock. Patient was admitted, started on broad spectrum antibiotics and started on pressors for presumed septic shock. However given low mix venous saturation there was c/f component of cardiogenic shock and pt was given inotropes however this was d/c due to tachycardia. He received an extensive infectious w/u which included a gallium scan c/w LLE cellulitis and a TTE w/o vegetations. Patient’s course was c/b worsening renal function and eventual anuria, for which renal was consulted. HD cath was placed and pt began CVVD and eventually transitioned to intermittent HD. His course was further c/b b/l pulmonary effusions requiring R chest tube placement. Given extensive cardiac hx and component of cardiogenic shock, cardiology was consulted and pt started on vasopressin for blood pressure and afterload reducers however unable to tolerate afterload reduction. Transient c/f HIT given platelet drop while on hep gtt, however ruled out and pt transitioned back to hep gtt and then bridged to Coumadin for AFib AC. Patient’s course was further c/b stroke when patient became unresponsive with decreased use of L side (stroke code -- CT negative, MR showing chronic infarcts and possible small brainstem stroke) requiring intubation for airway protection. His mental status did not improve after starting modafinil, vEEG done to r/o seizures showing slowing but no epileptiform discharges. Patient trach’d and PEG’d because unable to wean off vent and unable to mentate enough to swallow. Pt later found to be fungemic with candida tropicalis and treated with micafungin then narrowed to fluconazole. RUKHSANA at that time negative for vegetations or AICD infection. CT A/P performed to evaluate source which was unrevealing. HD cath removed and replaced after surveillance cultures remained negative. Course further complicated by Klebsiella bacteremia, treated with meropenem then de-escalated to Zosyn and de-escalated further to CTX. Upon weaning from pressors, pt started on hydrocortisone and midodrine. Pt with persistent fevers, HD cath again removed 4/19 and replaced with temporary HD cath. Patient then noted to have seizure like activity and started on Keppra. Pt also subsequently found to have septic shock 2/2 aspiration PNA, s/p treatment with Zosyn and pressors. Pt again weaned off pressors to stress dose steroids and midodrine for adrenal insufficiency. Patient unable to be weaned to trach collar and continued on mechanical ventilation. HD was continued on HD and eventually weaned off of albumin with end goal of L-TACH. Patient made DNR with further discussion with family regarding goals of care- but continued escalation of care, with pressors, etc. Course then c/b sepsis 2/2 candidal UTI and sacral decub ulcer with e/o osteomyelitis and abscess. Plastic surgery involved and debrided ulcer, but assessment was that ulcer was unstageable and incurable. Patient was deemed not a surgical candidate based on his comorbidities and inability lay in prone positioning post-op ([id number]). Multidisciplinary family meeting held involving social work, medical team, nursing staff to discuss utility in continuation of abx therapy given the complexity of the patients condition and poor prognosis. Family shared that they understand that pt is not improving despite all effort and made the decision to pursue comfort measures including no MEWS, escalation of care or continuation of antibiotic therapy. AICD was turned off, and antibiotics discontinued. Patient received last HD on Saturday 6/23/18. Now on Dilaudid ATC for comfort and pain. Attempts made to wean patient to trach collar daily for potential placement in hospice facility for remainder of days.    CC: septic Shock due to bilateral cellulitis - course complicated by renal failure (on hemodialysis), stroke, and respiratory failure. (01 Jun 2018 14:51)    OVERNIGHT EVENTS: ELSIE.     SUBJECTIVE / INTERVAL HPI:  Patient was seen and examined at bedside this AM. At 6:00 AM patient had a vomiting episode, consisted of food and brown chunky liquid. Patient became tachycardic to mid 30s, was suctioned, decreased back to 20-24. Tube feeds and insulin held, will resume at noontime. Patient is awake, resting comfortably, not complaining of pain. Continues to respond and follow commands with head nodding and hand squeezes.     ROS: negative unless otherwise specified    VITAL SIGNS:  T(C): 37.2 (29 Jun 2018 04:51), Max: 37.7 (28 Jun 2018 15:32)  T(F): 99 (29 Jun 2018 04:51), Max: 99.8 (28 Jun 2018 15:32)  HR: 112 (29 Jun 2018 05:01) (98 - 117)  BP: 93/63 (29 Jun 2018 04:51) (93/63 - 101/73)  BP(mean): --  RR: 20 (29 Jun 2018 05:01) (20 - 30)  SpO2: 100% (29 Jun 2018 05:01) (98% - 100%)    PHYSICAL EXAM:  Constitutional: awake resting in bed, thin/frail appearing, NAD   HEENT: temporal wasting, PERRLA, EOMI, dry MM  Neck: No JVD, tracheostomy with mild mucoid discharge  Respiratory: coarse breath sounds b/l, rhonchi b/l  Cardiovascular: normal S1S2, no m/r/g  Gastrointestinal: soft, nontender, nondistended. +BS, PEG tube in place. Rectal tube in place draining brown-watery stool  Extremities: hyperpigmented skin on anterior shin RLE, trace dependent edema, boots in place  Musculoskeletal: increased rigidity in UE, atrophied musculature in b/l LE  Vascular: 1+ peripheral pulses  Neurological: non-verbal, responding with head nodding and hand squeezes, following some commands, moving all extremities, no focal deficits  Skin: Large [id number] unstageable sacral ulcer with exposed necrotic tissue and muscles and tracking underneath in all directions, foul smelling, black eschar overlying tissue, non-bleeding, no purulent material    MEDICATIONS:  MEDICATIONS  (STANDING):  acetaminophen    Suspension. 650 milliGRAM(s) Oral every 8 hours  acetylcysteine 20% Inhalation 4 milliLiter(s) Inhalation two times a day  aspirin  chewable 81 milliGRAM(s) Oral daily  collagenase Ointment 1 Application(s) Topical daily  escitalopram 5 milliGRAM(s) Oral daily  hydrocortisone 10 milliGRAM(s) Oral every 12 hours  HYDROmorphone  Injectable 0.25 milliGRAM(s) IV Push every 4 hours  insulin glargine Injectable (LANTUS) 7 Unit(s) SubCutaneous at bedtime  insulin regular  human corrective regimen sliding scale   SubCutaneous <User Schedule>  insulin regular  human recombinant 15 Unit(s) SubCutaneous every 6 hours  levETIRAcetam  Solution 500 milliGRAM(s) Oral every 24 hours  metoclopramide 5 milliGRAM(s) Oral every 12 hours  midodrine 10 milliGRAM(s) Oral every 8 hours  modafinil 100 milliGRAM(s) Oral daily  scopolamine   Patch 1 Patch Transdermal every 3 days      ALLERGIES:  No Known Allergies      LABS:   No Labs        RADIOLOGY & ADDITIONAL TESTS: Reviewed.

## 2018-06-29 NOTE — PROGRESS NOTE ADULT - PROBLEM SELECTOR PLAN 7
-continue scopolamine patch for respiratory secretions   -can consider lorazepam 2mg iv q6h prn for agitation, but I would anticipate that pt will become increasingly lethargic as he goes into renal failure and accumulates toxins  -If pts HCP wishes to continue vent support, may need to consider transfer to a vent facility with a hospice support at the facility (rather than [facility]- which will be difficult to access while pt is on feeds and the vent)  -Sw updated as pts HCP requesting applications be sent to [facility] and [facility]

## 2018-06-29 NOTE — PROGRESS NOTE ADULT - SUBJECTIVE AND OBJECTIVE BOX
Palliative Medicine  following  this 63 year old male who has been hospitalized over 3 months.  Pt was admitted with cellulitis in March 2018.  His course was complicated by septic shock, cardiogenic shock, renal failure, respiratory failure, multiple infections and cva with seizure activity.  Pt remains mechanically vented, hemodialysis dependant, completely disabled requiring complete care for ADLs.  He is fed enterally through a PEG tube. Pt has developed significant large decubitus ulcer which is concerning for osteomyelitis (work up in progress). As pt is fecally incontinent, he continues to contaminate the wound with fecal matter.    Interval history:  stable.   last HD was Saturday June 23. last labs on June 23 (WB of 19).  remains with leukocytosis. Stable unstageable large sacral wound with tunnelling and foul odor.   Antibiotics have completed.  Pt noted to be more awake over the weekend, and denying pain, thus did not receive the scheduled pain medications.     Past 24 hours-  Unable to do further vent weaning.  Remains on AC.  pt received scheduled enteral hydromorphone as ordered    PAIN (0-10):  0/10-- pt denies  DYSPNEA (Y/N):  no  NAUSEA/VOMITING (Y/N):  no  SECRETIONS (Y/N):  yes  AGITATION (Y/N): no    OTHER REVIEW OF SYSTEMS:  UNABLE TO OBTAIN  due to:  altered mental status    ICU Vital Signs Last 24 Hrs    RR: 24 (27 Jun 2018 09:50) (18 - 24)  SpO2: 99% (27 Jun 2018 09:50) (98% - 100%)    GENERAL:  , chronically ill appearing. thin and frail, not distressed  HEENT:  NC/AT, normocephalic, sclera anicteric, temporal wasting  NECK:    supple, no JVD  CVS:     reg, + left chest wall PPM, R sided perm cath  RESP:  vented, course, + mucoid secretions with suctioning,  sonorous breath sounds, on AC- with CPAP trials  GI:    + PEG tube, flat, nontender, +BS  :    aneuric, on HD, no barnes  Rectal:  rectal tube in place, + liquid stool draining  MUSC:   no movement to Bilat LE, moving bilat UE against gravity, intermetacarpal  wasting  NEURO:   arousable  PSYCH:    maritza       SKIN:    specific observations per wound care nurse  LYMPH:      neg  	                   OPIATE NAÏVE (Y/N):  yes  ALLERGIES: No Known Allergies    MEDICATIONS  (STANDING):  acetaminophen    Suspension. 650 milliGRAM(s) Oral every 8 hours  acetylcysteine 20% Inhalation 4 milliLiter(s) Inhalation two times a day  collagenase Ointment 1 Application(s) Topical daily  hydrocortisone 10 milliGRAM(s) Oral every 12 hours  HYDROmorphone  Injectable 0.25 milliGRAM(s) IV Push every 4 hours  insulin glargine Injectable (LANTUS) 7 Unit(s) SubCutaneous at bedtime  insulin regular  human corrective regimen sliding scale   SubCutaneous <User Schedule>  insulin regular  human recombinant 15 Unit(s) SubCutaneous every 6 hours  levETIRAcetam  Solution 500 milliGRAM(s) Oral every 24 hours  metoclopramide 5 milliGRAM(s) Oral every 12 hours  midodrine 10 milliGRAM(s) Oral every 8 hours  modafinil 100 milliGRAM(s) Oral daily  scopolamine   Patch 1 Patch Transdermal every 3 days    MEDICATIONS  (PRN):  HYDROmorphone  Injectable 0.25 milliGRAM(s) IV Push every 1 hour PRN Breakthrough pain    LABS REVIEWED  last lab draw 6/23                IMAGING REVIEWED:  n/a  	  ADVANCED DIRECTIVES:    none    Decision Maker:  pts HCP is Cristal [place]   [phone]  Goals of care: comfort focused    PSYCHOSOCIAL-SPIRITUAL ASSESSMENT:        Pt is followed by his personal friend who is a . Pt is of a [employer] persuasion         GOALS OF CARE DISCUSSION:       Palliative care info/counseling provided	           Family meeting-- none scheduled as of now.        Advanced Directives; DNR as of May 2018      	        REFERRALS:        Palliative Med        Unit SW/Case Mgmt              Patient/Family Support-- for support of pts Omar bautista age 5 yo       Ethics

## 2018-06-29 NOTE — PROGRESS NOTE ADULT - ASSESSMENT
63M PMH HFrEF 10-15% (ischemic), MI, s/p AICD vs PPM, possible Afib, HTN, DM2 on insulin, possible CKD, and gout, who presented with a chief complaint of generalized weakness (3/10/2018) s/p septic shock likely 2/2 LE cellulitis complicated by ATN requiring dialysis. Course complicated by AMS likely from brainstem infarct 2/2 LV thrombus vs toxic metabolic encephalopathy. Further complicated by development of candidemia and sepsis 2/2 klebsiella bacteremia s/p fluconazole and CTX, now resolved. s/p completed course of Zosyn for aspiration PNA.  Goals of care discussion for which patient made DNR, but with continued escalation of care. Continues to undergo management for respiratory failure with weaning as tolerated with CPAP, trach collar trials. Patient does well with CPAP, but fails trach collar trials. With complications of hypotension (90s/60s), tolerating HD w/o albumin. Now with unstageable incurable sacral wound with decision for comfort measures only.

## 2018-06-29 NOTE — PROGRESS NOTE ADULT - PROBLEM SELECTOR PLAN 3
DM2 on Januvia at home. HbA1C 8.6  - c/w regular insulin 17u q6 hrs and Lantus 7units QHS  - ISS  - currently held, will continue with continuation of tube feeds

## 2018-06-29 NOTE — PROGRESS NOTE ADULT - PROBLEM SELECTOR PLAN 2
Acute respiratory failure in setting of septic and cardiogenic shock, with trach placed on 4/5/18 for persistent, now chronic respiratory failure.   - Dilaudid 0.25mg IV q1hr PRN for tachypnea for comfort  - scopolamine PRN secretions  - tolerating CPAP at night w/ PRN suctioning  - trach collar trial today Acute respiratory failure in setting of septic and cardiogenic shock, with trach placed on 4/5/18 for persistent, now chronic respiratory failure.   - Dilaudid 1mg Per tube Q4H, 0.25mg IV q1hr PRN for tachypnea for comfort  - scopolamine PRN secretions  - tolerating CPAP at night w/ PRN suctioning  - trach collar trial today

## 2018-06-30 LAB
GLUCOSE BLDC GLUCOMTR-MCNC: 102 MG/DL — HIGH (ref 70–99)
GLUCOSE BLDC GLUCOMTR-MCNC: 120 MG/DL — HIGH (ref 70–99)
GLUCOSE BLDC GLUCOMTR-MCNC: 175 MG/DL — HIGH (ref 70–99)
GLUCOSE BLDC GLUCOMTR-MCNC: 49 MG/DL — LOW (ref 70–99)
GLUCOSE BLDC GLUCOMTR-MCNC: 50 MG/DL — LOW (ref 70–99)
GLUCOSE BLDC GLUCOMTR-MCNC: 72 MG/DL — SIGNIFICANT CHANGE UP (ref 70–99)
GLUCOSE BLDC GLUCOMTR-MCNC: 90 MG/DL — SIGNIFICANT CHANGE UP (ref 70–99)

## 2018-06-30 RX ORDER — DEXTROSE 50 % IN WATER 50 %
25 SYRINGE (ML) INTRAVENOUS ONCE
Qty: 0 | Refills: 0 | Status: COMPLETED | OUTPATIENT
Start: 2018-06-30 | End: 2018-06-30

## 2018-06-30 RX ADMIN — MIDODRINE HYDROCHLORIDE 10 MILLIGRAM(S): 2.5 TABLET ORAL at 07:18

## 2018-06-30 RX ADMIN — HYDROMORPHONE HYDROCHLORIDE 1 MILLIGRAM(S): 2 INJECTION INTRAMUSCULAR; INTRAVENOUS; SUBCUTANEOUS at 07:15

## 2018-06-30 RX ADMIN — Medication 4 MILLILITER(S): at 17:55

## 2018-06-30 RX ADMIN — HYDROMORPHONE HYDROCHLORIDE 1 MILLIGRAM(S): 2 INJECTION INTRAMUSCULAR; INTRAVENOUS; SUBCUTANEOUS at 17:55

## 2018-06-30 RX ADMIN — HYDROMORPHONE HYDROCHLORIDE 1 MILLIGRAM(S): 2 INJECTION INTRAMUSCULAR; INTRAVENOUS; SUBCUTANEOUS at 11:37

## 2018-06-30 RX ADMIN — HYDROMORPHONE HYDROCHLORIDE 1 MILLIGRAM(S): 2 INJECTION INTRAMUSCULAR; INTRAVENOUS; SUBCUTANEOUS at 11:07

## 2018-06-30 RX ADMIN — Medication 1 APPLICATION(S): at 11:07

## 2018-06-30 RX ADMIN — INSULIN HUMAN 15 UNIT(S): 100 INJECTION, SOLUTION SUBCUTANEOUS at 07:16

## 2018-06-30 RX ADMIN — Medication 650 MILLIGRAM(S): at 07:16

## 2018-06-30 RX ADMIN — HYDROMORPHONE HYDROCHLORIDE 1 MILLIGRAM(S): 2 INJECTION INTRAMUSCULAR; INTRAVENOUS; SUBCUTANEOUS at 18:25

## 2018-06-30 RX ADMIN — Medication 10 MILLIGRAM(S): at 07:15

## 2018-06-30 RX ADMIN — MIDODRINE HYDROCHLORIDE 10 MILLIGRAM(S): 2.5 TABLET ORAL at 14:47

## 2018-06-30 RX ADMIN — Medication 5 MILLIGRAM(S): at 07:19

## 2018-06-30 RX ADMIN — LEVETIRACETAM 500 MILLIGRAM(S): 250 TABLET, FILM COATED ORAL at 14:47

## 2018-06-30 RX ADMIN — HYDROMORPHONE HYDROCHLORIDE 1 MILLIGRAM(S): 2 INJECTION INTRAMUSCULAR; INTRAVENOUS; SUBCUTANEOUS at 22:56

## 2018-06-30 RX ADMIN — Medication 10 MILLIGRAM(S): at 17:55

## 2018-06-30 RX ADMIN — INSULIN HUMAN 15 UNIT(S): 100 INJECTION, SOLUTION SUBCUTANEOUS at 13:36

## 2018-06-30 RX ADMIN — Medication 5 MILLIGRAM(S): at 17:55

## 2018-06-30 RX ADMIN — HYDROMORPHONE HYDROCHLORIDE 1 MILLIGRAM(S): 2 INJECTION INTRAMUSCULAR; INTRAVENOUS; SUBCUTANEOUS at 14:47

## 2018-06-30 RX ADMIN — INSULIN HUMAN 1: 100 INJECTION, SOLUTION SUBCUTANEOUS at 01:11

## 2018-06-30 RX ADMIN — MODAFINIL 100 MILLIGRAM(S): 200 TABLET ORAL at 11:07

## 2018-06-30 RX ADMIN — Medication 25 MILLILITER(S): at 18:50

## 2018-06-30 RX ADMIN — Medication 650 MILLIGRAM(S): at 23:00

## 2018-06-30 RX ADMIN — HYDROMORPHONE HYDROCHLORIDE 1 MILLIGRAM(S): 2 INJECTION INTRAMUSCULAR; INTRAVENOUS; SUBCUTANEOUS at 07:45

## 2018-06-30 RX ADMIN — Medication 650 MILLIGRAM(S): at 07:45

## 2018-06-30 RX ADMIN — Medication 4 MILLILITER(S): at 07:15

## 2018-06-30 RX ADMIN — INSULIN HUMAN 15 UNIT(S): 100 INJECTION, SOLUTION SUBCUTANEOUS at 01:12

## 2018-06-30 RX ADMIN — Medication 650 MILLIGRAM(S): at 14:47

## 2018-06-30 RX ADMIN — Medication 650 MILLIGRAM(S): at 22:56

## 2018-06-30 RX ADMIN — HYDROMORPHONE HYDROCHLORIDE 1 MILLIGRAM(S): 2 INJECTION INTRAMUSCULAR; INTRAVENOUS; SUBCUTANEOUS at 15:17

## 2018-06-30 RX ADMIN — HYDROMORPHONE HYDROCHLORIDE 1 MILLIGRAM(S): 2 INJECTION INTRAMUSCULAR; INTRAVENOUS; SUBCUTANEOUS at 23:00

## 2018-06-30 RX ADMIN — CHLORHEXIDINE GLUCONATE 1 APPLICATION(S): 213 SOLUTION TOPICAL at 11:07

## 2018-06-30 RX ADMIN — HYDROMORPHONE HYDROCHLORIDE 1 MILLIGRAM(S): 2 INJECTION INTRAMUSCULAR; INTRAVENOUS; SUBCUTANEOUS at 04:02

## 2018-06-30 RX ADMIN — Medication 650 MILLIGRAM(S): at 15:17

## 2018-06-30 RX ADMIN — MIDODRINE HYDROCHLORIDE 10 MILLIGRAM(S): 2.5 TABLET ORAL at 22:56

## 2018-06-30 NOTE — PROGRESS NOTE ADULT - PROBLEM/PLAN-5
DISPLAY PLAN FREE TEXT

## 2018-06-30 NOTE — PROGRESS NOTE ADULT - PROBLEM SELECTOR PLAN 9
VTE: decision for no AC    F: No IVF  E: No repletion of lytes, last hemodialysis on Saturday  N: Jevity 1.5 goal rate 68cc/hr  Dispo: family meeting with decisions for discontinuation of abx therapy, comfort measures, no escalation of antibiotics, no MEWs, no escalation of care.   *On Hydromorphone 1mg via PEG q4hrs ATC

## 2018-06-30 NOTE — PROGRESS NOTE ADULT - SUBJECTIVE AND OBJECTIVE BOX
PGY1 PROGRESS NOTE:    CC: septic shock due to bilateral cellulitis - course complicated by renal failure (on hemodialysis), stroke, and respiratory failure.    OVERNIGHT EVENTS: There were no acute overnight events.    SUBJECTIVE / INTERVAL HPI:  Patient was seen and examined at bedside this AM. Patient is awake, alert, appears comfortable and not complaining of pain (responds to questions with hand squeezes). RR down to 18.    ROS: negative unless otherwise specified    VITAL SIGNS:  Vital Signs Last 24 Hrs  T(C): 36.9 (30 Jun 2018 09:10), Max: 37.3 (29 Jun 2018 19:51)  T(F): 98.5 (30 Jun 2018 09:10), Max: 99.2 (29 Jun 2018 19:51)  HR: 89 (30 Jun 2018 09:10) (89 - 108)  BP: 103/65 (30 Jun 2018 09:10) (90/50 - 103/65)  BP(mean): --  RR: 17 (30 Jun 2018 09:10) (17 - 22)  SpO2: 100% (30 Jun 2018 09:10) (96% - 100%)    PHYSICAL EXAM:  Constitutional: awake resting in bed, thin/frail appearing, NAD   HEENT: temporal wasting, PERRLA, EOMI  Neck: No JVD, tracheostomy with mild mucous discharge  Respiratory: Short coarse breath sounds b/l  Cardiovascular: normal S1S2, no m/r/g  Gastrointestinal: soft, nontender, nondistended. +BS, PEG tube in place. Rectal tube in place draining stool  Extremities: hyperpigmented skin on anterior shin RLE, trace dependent edema, boots in place  Musculoskeletal: increased rigidity in UE, atrophied musculature in b/l LE  Vascular: 1+ peripheral pulses  Neurological: non-verbal, responding with head nodding and hand squeezes, following some commands, moving all extremities, no focal deficits  Skin: Large [id number] unstageable sacral ulcer with exposed necrotic tissue and muscles and tracking underneath in all directions, foul smelling, black eschar overlying tissue, non-bleeding, no purulent material, aquacel dressing placed    MEDICATIONS:  MEDICATIONS  (STANDING):  acetaminophen    Suspension. 650 milliGRAM(s) Oral every 8 hours  acetylcysteine 20% Inhalation 4 milliLiter(s) Inhalation two times a day  aspirin  chewable 81 milliGRAM(s) Oral daily  collagenase Ointment 1 Application(s) Topical daily  escitalopram 5 milliGRAM(s) Oral daily  hydrocortisone 10 milliGRAM(s) Oral every 12 hours  HYDROmorphone  Injectable 0.25 milliGRAM(s) IV Push every 4 hours  insulin glargine Injectable (LANTUS) 7 Unit(s) SubCutaneous at bedtime  insulin regular  human corrective regimen sliding scale   SubCutaneous <User Schedule>  insulin regular  human recombinant 15 Unit(s) SubCutaneous every 6 hours  levETIRAcetam  Solution 500 milliGRAM(s) Oral every 24 hours  metoclopramide 5 milliGRAM(s) Oral every 12 hours  midodrine 10 milliGRAM(s) Oral every 8 hours  modafinil 100 milliGRAM(s) Oral daily  scopolamine   Patch 1 Patch Transdermal every 3 days      ALLERGIES:  No Known Allergies      LABS:  No Labs        RADIOLOGY & ADDITIONAL TESTS: Reviewed.

## 2018-06-30 NOTE — PROGRESS NOTE ADULT - PROBLEM/PLAN-3
DISPLAY PLAN FREE TEXT

## 2018-06-30 NOTE — PROVIDER CONTACT NOTE (CHANGE IN STATUS NOTIFICATION) - DATE AND TIME:
03-Apr-2018 15:00
28-Mar-2018 10:00
29-Mar-2018 16:00
30-Jun-2018 15:10
30-Jun-2018 17:58
22-Mar-2018 17:30

## 2018-06-30 NOTE — PROGRESS NOTE ADULT - PROBLEM/PLAN-6
DISPLAY PLAN FREE TEXT

## 2018-06-30 NOTE — PROGRESS NOTE ADULT - PROBLEM SELECTOR PLAN 1
Resolved. Meeting SIRS criteria with leukocytosis and fever on 6/15. Source likely unsteagable/incurable sacral ulcer with e/o osteo. Was on Vanc and Zosyn, however, in this situation where patient has non-surgical sacral wound and poor prognosis, little utility in continuing abx at this time as patient likely to develop resistance  - antibiotics discontinued as patient has a chronic incurable infection. Discussed at family meeting.   - decision for no escalation of antibiotics, no escalation of care, no MEWS    #Sacral decubitus ulcer- unstageable s/p debridement with plastic surgery. Cultures growing multiple organisms including Pseudomonas and E faecium. Pelvic MRI showing acute osteomyelitis and loculated collection abutting the bladder. Patient is not a surgical candidate based on his comorbidities and inability lay in prone positioning post-op ([id number]).   - discontinued abx as above   - pain control w Dilaudid 0.25mg q4hrs and 0.25mg q1hr PRN   - frequent turning q2h for pressure offloading, air fluid mattress  - wound care with dry dressings with Allevyn over top  - soilage control with rectal tube    #UTI- Patient with urinary retention requiring chronic indwelling Jose catheter. Urine cultures growing Sherrie Dubliniensis sensitive to Fluconazole.  - s/p Fluconazole

## 2018-06-30 NOTE — PROVIDER CONTACT NOTE (CHANGE IN STATUS NOTIFICATION) - ASSESSMENT
noted patient to be shaking head (not tremors or seizures) and other signs of restlessness.
Patient responsive to repeated stimuli; sometimes with spontaneous eye opening. On trache to vent. On Jevity 1.5 feedings at 30cc/hr. Rectal tube.

## 2018-06-30 NOTE — PROGRESS NOTE ADULT - PROBLEM SELECTOR PLAN 8
CHF with EF 10-15% 2/2 ischemic cardiomyopathy s/p AICD. Patient with persistent pleural effusions on CXR and US and b/l dependent edema.  - HOLDING ACE/BB in setting of hypotension  - c/w midodrine TID  - EP to turn off AICD today per family wishes     #CAD- Hx of MI and ischemic CM s/p AICD, STEMI 7/2017.  - Aspirin and statin d/c'd to reduce pill burden

## 2018-06-30 NOTE — PROGRESS NOTE ADULT - PROBLEM/PLAN-7
DISPLAY PLAN FREE TEXT

## 2018-06-30 NOTE — PROGRESS NOTE ADULT - PROBLEM SELECTOR PLAN 2
Acute respiratory failure in setting of septic and cardiogenic shock, with trach placed on 4/5/18 for persistent, now chronic respiratory failure.   - Dilaudid 1mg Per tube Q4H, 0.25mg IV q1hr PRN for tachypnea for comfort  - scopolamine PRN secretions  - tolerating CPAP at night w/ PRN suctioning  - trach collar trial today

## 2018-06-30 NOTE — PROGRESS NOTE ADULT - PROBLEM SELECTOR PLAN 3
DM2 on Januvia at home. HbA1C 8.6. Had POCT 300s (6/29), now down to 102 this AM (6/30).  - c/w regular insulin 17u q6 hrs and Lantus 7units QHS  - ISS  - currently held, will continue with continuation of tube feeds

## 2018-06-30 NOTE — PROGRESS NOTE ADULT - PROBLEM/PLAN-1
DISPLAY PLAN FREE TEXT

## 2018-06-30 NOTE — PROGRESS NOTE ADULT - PROBLEM/PLAN-4
DISPLAY PLAN FREE TEXT

## 2018-06-30 NOTE — PROGRESS NOTE ADULT - I WAS PHYSICALLY PRESENT FOR THE KEY PORTIONS OF THE EVALUATION AND MANAGEMENT (E/M) SERVICE PROVIDED.  I AGREE WITH THE ABOVE HISTORY, PHYSICAL, AND PLAN WHICH I HAVE REVIEWED AND EDITED WHERE APPROPRIATE

## 2018-06-30 NOTE — PROVIDER CONTACT NOTE (CHANGE IN STATUS NOTIFICATION) - ACTION/TREATMENT ORDERED:
went to CT with MD, intubated for airway protection.
Go by A line pressure as it is more accurate. Ok to increase UF rate to 75. If pressure drops, plan is to restart levophed.
Metoprolol 12.5mg PO
Interventions pending.
Feedings held.

## 2018-06-30 NOTE — PROVIDER CONTACT NOTE (CHANGE IN STATUS NOTIFICATION) - SITUATION
>100cc residuals noted on PEG tube
Getting more tachycardic since end of CVVHD.
Patient with fingerstick of 51. NPO due to >100cc residuals noted in PEG tube upon assessment. Unable to obtain IV access as of this time. MD John aware since last shift.
Pt cuff pressure is 79/65 MAP 70 does not correlate with A line. Order to increase Ultrafiltration rate on CVVHD from 25 to 75. Do we go by A line BP or cuff pressure?
pt became unresponsive after arterial line placement. not responding to stimulus. pupils fixed.

## 2018-06-30 NOTE — PROGRESS NOTE ADULT - ATTENDING COMMENTS
DX  SACRAL OSTEOMYELITIS- non operative. abx dc'd as determined by medical team and family.   CANDIDURIA- s/p anti-fungal tx  ENCEPHALOPATHY /BRAIN STEM STROKE  S/P TRACH - cont trach care. Supportive care at this time  RESP FAILURE S/P TRACH - remains on vent  DYSPHAGIA S/P PEG - cont tube feeds  DM II-cont lantus /ssi  CAD / HFrEF s/p AICD - off bb and ace inhib due to low bps. off asa/statin   ADRENAL INSUFF - cont hydrocortisone and midodrine  AFIB- metoprolol PRN  ESRD- per family request, pt will not continue with HD  LV THROMBUS - family has declined a/c at this time   DISPO - pt remains comfort care at this time

## 2018-07-01 VITALS
SYSTOLIC BLOOD PRESSURE: 126 MMHG | HEART RATE: 106 BPM | TEMPERATURE: 99 F | RESPIRATION RATE: 21 BRPM | DIASTOLIC BLOOD PRESSURE: 81 MMHG | OXYGEN SATURATION: 99 %

## 2018-07-01 LAB
GLUCOSE BLDC GLUCOMTR-MCNC: 101 MG/DL — HIGH (ref 70–99)
GLUCOSE BLDC GLUCOMTR-MCNC: 45 MG/DL — CRITICAL LOW (ref 70–99)
GLUCOSE BLDC GLUCOMTR-MCNC: 81 MG/DL — SIGNIFICANT CHANGE UP (ref 70–99)

## 2018-07-01 PROCEDURE — 88112 CYTOPATH CELL ENHANCE TECH: CPT

## 2018-07-01 PROCEDURE — 36430 TRANSFUSION BLD/BLD COMPNT: CPT

## 2018-07-01 PROCEDURE — 82553 CREATINE MB FRACTION: CPT

## 2018-07-01 PROCEDURE — 84165 PROTEIN E-PHORESIS SERUM: CPT

## 2018-07-01 PROCEDURE — 84480 ASSAY TRIIODOTHYRONINE (T3): CPT

## 2018-07-01 PROCEDURE — 84681 ASSAY OF C-PEPTIDE: CPT

## 2018-07-01 PROCEDURE — P9045: CPT

## 2018-07-01 PROCEDURE — 84132 ASSAY OF SERUM POTASSIUM: CPT

## 2018-07-01 PROCEDURE — 93971 EXTREMITY STUDY: CPT

## 2018-07-01 PROCEDURE — 73700 CT LOWER EXTREMITY W/O DYE: CPT

## 2018-07-01 PROCEDURE — P9016: CPT

## 2018-07-01 PROCEDURE — 84157 ASSAY OF PROTEIN OTHER: CPT

## 2018-07-01 PROCEDURE — 87070 CULTURE OTHR SPECIMN AEROBIC: CPT

## 2018-07-01 PROCEDURE — G0365: CPT

## 2018-07-01 PROCEDURE — 87486 CHLMYD PNEUM DNA AMP PROBE: CPT

## 2018-07-01 PROCEDURE — 85027 COMPLETE CBC AUTOMATED: CPT

## 2018-07-01 PROCEDURE — 88305 TISSUE EXAM BY PATHOLOGIST: CPT

## 2018-07-01 PROCEDURE — 82310 ASSAY OF CALCIUM: CPT

## 2018-07-01 PROCEDURE — P9047: CPT

## 2018-07-01 PROCEDURE — 82140 ASSAY OF AMMONIA: CPT

## 2018-07-01 PROCEDURE — 87116 MYCOBACTERIA CULTURE: CPT

## 2018-07-01 PROCEDURE — 70450 CT HEAD/BRAIN W/O DYE: CPT

## 2018-07-01 PROCEDURE — 74019 RADEX ABDOMEN 2 VIEWS: CPT

## 2018-07-01 PROCEDURE — 85652 RBC SED RATE AUTOMATED: CPT

## 2018-07-01 PROCEDURE — 97110 THERAPEUTIC EXERCISES: CPT

## 2018-07-01 PROCEDURE — 83735 ASSAY OF MAGNESIUM: CPT

## 2018-07-01 PROCEDURE — 87184 SC STD DISK METHOD PER PLATE: CPT

## 2018-07-01 PROCEDURE — 86900 BLOOD TYPING SEROLOGIC ABO: CPT

## 2018-07-01 PROCEDURE — C8929: CPT

## 2018-07-01 PROCEDURE — 82803 BLOOD GASES ANY COMBINATION: CPT

## 2018-07-01 PROCEDURE — 97530 THERAPEUTIC ACTIVITIES: CPT

## 2018-07-01 PROCEDURE — 74018 RADEX ABDOMEN 1 VIEW: CPT

## 2018-07-01 PROCEDURE — 86638 Q FEVER ANTIBODY: CPT

## 2018-07-01 PROCEDURE — 76705 ECHO EXAM OF ABDOMEN: CPT

## 2018-07-01 PROCEDURE — 72125 CT NECK SPINE W/O DYE: CPT

## 2018-07-01 PROCEDURE — 84100 ASSAY OF PHOSPHORUS: CPT

## 2018-07-01 PROCEDURE — C1769: CPT

## 2018-07-01 PROCEDURE — 83615 LACTATE (LD) (LDH) ENZYME: CPT

## 2018-07-01 PROCEDURE — 85730 THROMBOPLASTIN TIME PARTIAL: CPT

## 2018-07-01 PROCEDURE — 85610 PROTHROMBIN TIME: CPT

## 2018-07-01 PROCEDURE — 87075 CULTR BACTERIA EXCEPT BLOOD: CPT

## 2018-07-01 PROCEDURE — C1752: CPT

## 2018-07-01 PROCEDURE — 83930 ASSAY OF BLOOD OSMOLALITY: CPT

## 2018-07-01 PROCEDURE — 83036 HEMOGLOBIN GLYCOSYLATED A1C: CPT

## 2018-07-01 PROCEDURE — 93306 TTE W/DOPPLER COMPLETE: CPT

## 2018-07-01 PROCEDURE — 84300 ASSAY OF URINE SODIUM: CPT

## 2018-07-01 PROCEDURE — 82945 GLUCOSE OTHER FLUID: CPT

## 2018-07-01 PROCEDURE — 70496 CT ANGIOGRAPHY HEAD: CPT

## 2018-07-01 PROCEDURE — 86803 HEPATITIS C AB TEST: CPT

## 2018-07-01 PROCEDURE — 87493 C DIFF AMPLIFIED PROBE: CPT

## 2018-07-01 PROCEDURE — 74177 CT ABD & PELVIS W/CONTRAST: CPT

## 2018-07-01 PROCEDURE — 85379 FIBRIN DEGRADATION QUANT: CPT

## 2018-07-01 PROCEDURE — 87103 BLOOD FUNGUS CULTURE: CPT

## 2018-07-01 PROCEDURE — 84156 ASSAY OF PROTEIN URINE: CPT

## 2018-07-01 PROCEDURE — 71045 X-RAY EXAM CHEST 1 VIEW: CPT

## 2018-07-01 PROCEDURE — 87150 DNA/RNA AMPLIFIED PROBE: CPT

## 2018-07-01 PROCEDURE — 87798 DETECT AGENT NOS DNA AMP: CPT

## 2018-07-01 PROCEDURE — 73590 X-RAY EXAM OF LOWER LEG: CPT

## 2018-07-01 PROCEDURE — 87040 BLOOD CULTURE FOR BACTERIA: CPT

## 2018-07-01 PROCEDURE — 82436 ASSAY OF URINE CHLORIDE: CPT

## 2018-07-01 PROCEDURE — 80074 ACUTE HEPATITIS PANEL: CPT

## 2018-07-01 PROCEDURE — 86038 ANTINUCLEAR ANTIBODIES: CPT

## 2018-07-01 PROCEDURE — 36558 INSERT TUNNELED CV CATH: CPT

## 2018-07-01 PROCEDURE — 87205 SMEAR GRAM STAIN: CPT

## 2018-07-01 PROCEDURE — 93975 VASCULAR STUDY: CPT

## 2018-07-01 PROCEDURE — 87389 HIV-1 AG W/HIV-1&-2 AB AG IA: CPT

## 2018-07-01 PROCEDURE — 87581 M.PNEUMON DNA AMP PROBE: CPT

## 2018-07-01 PROCEDURE — 83970 ASSAY OF PARATHORMONE: CPT

## 2018-07-01 PROCEDURE — P9035: CPT

## 2018-07-01 PROCEDURE — 76700 US EXAM ABDOM COMPLETE: CPT

## 2018-07-01 PROCEDURE — 84295 ASSAY OF SERUM SODIUM: CPT

## 2018-07-01 PROCEDURE — 83880 ASSAY OF NATRIURETIC PEPTIDE: CPT

## 2018-07-01 PROCEDURE — 84439 ASSAY OF FREE THYROXINE: CPT

## 2018-07-01 PROCEDURE — 77001 FLUOROGUIDE FOR VEIN DEVICE: CPT

## 2018-07-01 PROCEDURE — 85384 FIBRINOGEN ACTIVITY: CPT

## 2018-07-01 PROCEDURE — 82330 ASSAY OF CALCIUM: CPT

## 2018-07-01 PROCEDURE — 95951: CPT

## 2018-07-01 PROCEDURE — 93312 ECHO TRANSESOPHAGEAL: CPT

## 2018-07-01 PROCEDURE — 86901 BLOOD TYPING SEROLOGIC RH(D): CPT

## 2018-07-01 PROCEDURE — 82962 GLUCOSE BLOOD TEST: CPT

## 2018-07-01 PROCEDURE — 96375 TX/PRO/DX INJ NEW DRUG ADDON: CPT

## 2018-07-01 PROCEDURE — 82728 ASSAY OF FERRITIN: CPT

## 2018-07-01 PROCEDURE — 81001 URINALYSIS AUTO W/SCOPE: CPT

## 2018-07-01 PROCEDURE — 36589 REMOVAL TUNNELED CV CATH: CPT

## 2018-07-01 PROCEDURE — 86022 PLATELET ANTIBODIES: CPT

## 2018-07-01 PROCEDURE — 83605 ASSAY OF LACTIC ACID: CPT

## 2018-07-01 PROCEDURE — 82533 TOTAL CORTISOL: CPT

## 2018-07-01 PROCEDURE — 74176 CT ABD & PELVIS W/O CONTRAST: CPT

## 2018-07-01 PROCEDURE — 95819 EEG AWAKE AND ASLEEP: CPT

## 2018-07-01 PROCEDURE — 96374 THER/PROPH/DIAG INJ IV PUSH: CPT

## 2018-07-01 PROCEDURE — 86704 HEP B CORE ANTIBODY TOTAL: CPT

## 2018-07-01 PROCEDURE — 86705 HEP B CORE ANTIBODY IGM: CPT

## 2018-07-01 PROCEDURE — 84311 SPECTROPHOTOMETRY: CPT

## 2018-07-01 PROCEDURE — 83986 ASSAY PH BODY FLUID NOS: CPT

## 2018-07-01 PROCEDURE — 86334 IMMUNOFIX E-PHORESIS SERUM: CPT

## 2018-07-01 PROCEDURE — 80053 COMPREHEN METABOLIC PANEL: CPT

## 2018-07-01 PROCEDURE — 82977 ASSAY OF GGT: CPT

## 2018-07-01 PROCEDURE — 36415 COLL VENOUS BLD VENIPUNCTURE: CPT

## 2018-07-01 PROCEDURE — 94640 AIRWAY INHALATION TREATMENT: CPT

## 2018-07-01 PROCEDURE — 80048 BASIC METABOLIC PNL TOTAL CA: CPT

## 2018-07-01 PROCEDURE — 82042 OTHER SOURCE ALBUMIN QUAN EA: CPT

## 2018-07-01 PROCEDURE — 93970 EXTREMITY STUDY: CPT

## 2018-07-01 PROCEDURE — 87015 SPECIMEN INFECT AGNT CONCNTJ: CPT

## 2018-07-01 PROCEDURE — 83010 ASSAY OF HAPTOGLOBIN QUANT: CPT

## 2018-07-01 PROCEDURE — 82652 VIT D 1 25-DIHYDROXY: CPT

## 2018-07-01 PROCEDURE — 82550 ASSAY OF CK (CPK): CPT

## 2018-07-01 PROCEDURE — 86706 HEP B SURFACE ANTIBODY: CPT

## 2018-07-01 PROCEDURE — 84484 ASSAY OF TROPONIN QUANT: CPT

## 2018-07-01 PROCEDURE — 89051 BODY FLUID CELL COUNT: CPT

## 2018-07-01 PROCEDURE — 87186 SC STD MICRODIL/AGAR DIL: CPT

## 2018-07-01 PROCEDURE — 87340 HEPATITIS B SURFACE AG IA: CPT

## 2018-07-01 PROCEDURE — 94003 VENT MGMT INPAT SUBQ DAY: CPT

## 2018-07-01 PROCEDURE — C1750: CPT

## 2018-07-01 PROCEDURE — 70551 MRI BRAIN STEM W/O DYE: CPT

## 2018-07-01 PROCEDURE — 83521 IG LIGHT CHAINS FREE EACH: CPT

## 2018-07-01 PROCEDURE — 87449 NOS EACH ORGANISM AG IA: CPT

## 2018-07-01 PROCEDURE — 99285 EMERGENCY DEPT VISIT HI MDM: CPT | Mod: 25

## 2018-07-01 PROCEDURE — 80202 ASSAY OF VANCOMYCIN: CPT

## 2018-07-01 PROCEDURE — 87086 URINE CULTURE/COLONY COUNT: CPT

## 2018-07-01 PROCEDURE — 87206 SMEAR FLUORESCENT/ACID STAI: CPT

## 2018-07-01 PROCEDURE — 76937 US GUIDE VASCULAR ACCESS: CPT

## 2018-07-01 PROCEDURE — 86780 TREPONEMA PALLIDUM: CPT

## 2018-07-01 PROCEDURE — 86923 COMPATIBILITY TEST ELECTRIC: CPT

## 2018-07-01 PROCEDURE — 87324 CLOSTRIDIUM AG IA: CPT

## 2018-07-01 PROCEDURE — A9556: CPT

## 2018-07-01 PROCEDURE — 84550 ASSAY OF BLOOD/URIC ACID: CPT

## 2018-07-01 PROCEDURE — 82247 BILIRUBIN TOTAL: CPT

## 2018-07-01 PROCEDURE — 97163 PT EVAL HIGH COMPLEX 45 MIN: CPT

## 2018-07-01 PROCEDURE — 82570 ASSAY OF URINE CREATININE: CPT

## 2018-07-01 PROCEDURE — 87106 FUNGI IDENTIFICATION YEAST: CPT

## 2018-07-01 PROCEDURE — 97164 PT RE-EVAL EST PLAN CARE: CPT

## 2018-07-01 PROCEDURE — P9017: CPT

## 2018-07-01 PROCEDURE — 82585 ASSAY OF CRYOFIBRINOGEN: CPT

## 2018-07-01 PROCEDURE — 84133 ASSAY OF URINE POTASSIUM: CPT

## 2018-07-01 PROCEDURE — 86850 RBC ANTIBODY SCREEN: CPT

## 2018-07-01 PROCEDURE — 90935 HEMODIALYSIS ONE EVALUATION: CPT

## 2018-07-01 PROCEDURE — 82248 BILIRUBIN DIRECT: CPT

## 2018-07-01 PROCEDURE — 72195 MRI PELVIS W/O DYE: CPT

## 2018-07-01 PROCEDURE — 85025 COMPLETE CBC W/AUTO DIFF WBC: CPT

## 2018-07-01 PROCEDURE — 76882 US LMTD JT/FCL EVL NVASC XTR: CPT

## 2018-07-01 PROCEDURE — 86160 COMPLEMENT ANTIGEN: CPT

## 2018-07-01 PROCEDURE — 86140 C-REACTIVE PROTEIN: CPT

## 2018-07-01 PROCEDURE — 83525 ASSAY OF INSULIN: CPT

## 2018-07-01 PROCEDURE — 83550 IRON BINDING TEST: CPT

## 2018-07-01 PROCEDURE — 36556 INSERT NON-TUNNEL CV CATH: CPT

## 2018-07-01 PROCEDURE — 93005 ELECTROCARDIOGRAM TRACING: CPT

## 2018-07-01 PROCEDURE — 82306 VITAMIN D 25 HYDROXY: CPT

## 2018-07-01 PROCEDURE — 84436 ASSAY OF TOTAL THYROXINE: CPT

## 2018-07-01 PROCEDURE — 84443 ASSAY THYROID STIM HORMONE: CPT

## 2018-07-01 PROCEDURE — 94002 VENT MGMT INPAT INIT DAY: CPT

## 2018-07-01 PROCEDURE — 78802 RP LOCLZJ TUM WHBDY 1 D IMG: CPT

## 2018-07-01 PROCEDURE — 84155 ASSAY OF PROTEIN SERUM: CPT

## 2018-07-01 PROCEDURE — 87181 SC STD AGAR DILUTION PER AGT: CPT

## 2018-07-01 PROCEDURE — 87633 RESP VIRUS 12-25 TARGETS: CPT

## 2018-07-01 PROCEDURE — 86757 RICKETTSIA ANTIBODY: CPT

## 2018-07-01 PROCEDURE — 94660 CPAP INITIATION&MGMT: CPT

## 2018-07-01 PROCEDURE — 70498 CT ANGIOGRAPHY NECK: CPT

## 2018-07-01 PROCEDURE — 82595 ASSAY OF CRYOGLOBULIN: CPT

## 2018-07-01 PROCEDURE — 80076 HEPATIC FUNCTION PANEL: CPT

## 2018-07-01 RX ADMIN — HYDROMORPHONE HYDROCHLORIDE 1 MILLIGRAM(S): 2 INJECTION INTRAMUSCULAR; INTRAVENOUS; SUBCUTANEOUS at 02:02

## 2018-07-01 RX ADMIN — Medication 4 MILLILITER(S): at 06:59

## 2018-07-01 RX ADMIN — HYDROMORPHONE HYDROCHLORIDE 1 MILLIGRAM(S): 2 INJECTION INTRAMUSCULAR; INTRAVENOUS; SUBCUTANEOUS at 06:56

## 2018-07-01 RX ADMIN — HYDROMORPHONE HYDROCHLORIDE 1 MILLIGRAM(S): 2 INJECTION INTRAMUSCULAR; INTRAVENOUS; SUBCUTANEOUS at 02:30

## 2018-07-01 RX ADMIN — Medication 650 MILLIGRAM(S): at 06:56

## 2018-07-01 RX ADMIN — Medication 10 MILLIGRAM(S): at 06:59

## 2018-07-01 RX ADMIN — INSULIN HUMAN 15 UNIT(S): 100 INJECTION, SOLUTION SUBCUTANEOUS at 00:31

## 2018-07-01 RX ADMIN — MIDODRINE HYDROCHLORIDE 10 MILLIGRAM(S): 2.5 TABLET ORAL at 07:00

## 2018-07-01 RX ADMIN — Medication 5 MILLIGRAM(S): at 06:59

## 2018-07-01 NOTE — DISCHARGE NOTE FOR THE EXPIRED PATIENT - HOSPITAL COURSE
63M PMH HFrEF 10-15% (ischemic), CAD (STEMI per [facility] records 7/17), s/p AICD, Afib, HTN, DM2 (on insulin), CKD, gout admitted for septic shock 2/2 LE cellulitis as well as concern for ATN in setting of shock. Patient was admitted, started on broad spectrum antibiotics and started on pressors for presumed septic shock. However given low mix venous saturation there was c/f component of cardiogenic shock and pt was given inotropes however this was d/c due to tachycardia. He received an extensive infectious w/u which included a gallium scan c/w LLE cellulitis and a TTE w/o vegetations. Patient’s course was c/b worsening renal function and eventual anuria, for which renal was consulted. HD cath was placed and pt began CVVD and eventually transitioned to intermittent HD. His course was further c/b b/l pulmonary effusions requiring R chest tube placement. Given extensive cardiac hx and component of cardiogenic shock, cardiology was consulted and pt started on vasopressin for blood pressure and afterload reducers however unable to tolerate afterload reduction. Transient c/f HIT given platelet drop while on hep gtt, however ruled out and pt transitioned back to hep gtt and then bridged to Coumadin for AFib AC. Patient’s course was further c/b stroke when patient became unresponsive with decreased use of L side (stroke code -- CT negative, MR showing chronic infarcts and possible small brainstem stroke) requiring intubation for airway protection. His mental status did not improve after starting modafinil, vEEG done to r/o seizures showing slowing but no epileptiform discharges. Patient trach’d and PEG’d because unable to wean off vent and unable to mentate enough to swallow. Pt later found to be fungemic with candida tropicalis and treated with micafungin then narrowed to fluconazole. RUKHSANA at that time negative for vegetations or AICD infection. CT A/P performed to evaluate source which was unrevealing. HD cath removed and replaced after surveillance cultures remained negative. Course further complicated by Klebsiella bacteremia, treated with meropenem then de-escalated to Zosyn and de-escalated further to CTX. Upon weaning from pressors, pt started on hydrocortisone and midodrine. Pt with persistent fevers, HD cath again removed 4/19 and replaced with temporary HD cath. Patient then noted to have seizure like activity and started on Keppra. Pt also subsequently found to have septic shock 2/2 aspiration PNA, s/p treatment with Zosyn and pressors. Pt again weaned off pressors to stress dose steroids and midodrine for adrenal insufficiency. Patient unable to be weaned to trach collar and continued on mechanical ventilation. HD was continued on HD and eventually weaned off of albumin with end goal of L-TACH. Patient made DNR with further discussion with family regarding goals of care- but continued escalation of care, with pressors, etc. Course then c/b sepsis 2/2 candidal UTI and sacral decub ulcer with e/o osteomyelitis and abscess. Plastic surgery involved and debrided ulcer, but assessment was that ulcer was unstageable and incurable. Patient was deemed not a surgical candidate based on his comorbidities and inability lay in prone positioning post-op ([id number]). Multidisciplinary family meeting held involving social work, medical team, nursing staff to discuss utility in continuation of abx therapy given the complexity of the patients condition and poor prognosis. Family shared that they understand that pt is not improving despite all effort and made the decision to pursue comfort measures including no MEWS, escalation of care or continuation of antibiotic therapy. AICD was turned off, and antibiotics discontinued. Patient received last HD on Saturday 6/23/18. Now on Dilaudid ATC for comfort and pain. Attempts made to wean patient to trach collar daily for potential placement in hospice facility for remainder of days. Patient was found by nurse to be unresponsive, breathing with vent, pupils blown. Patient dead based on multiorgan failure 2/2 sepsis, cardiopulmonary failure, multifactorial. Daughter (Cristal) and her  at bedside. 63M PMH HFrEF 10-15% (ischemic), CAD (STEMI per [facility] records 7/17), s/p AICD, Afib, HTN, DM2 (on insulin), CKD, gout admitted for septic shock 2/2 LE cellulitis as well as concern for ATN in setting of shock. Patient was admitted, started on broad spectrum antibiotics and started on pressors for presumed septic shock. However given low mix venous saturation there was c/f component of cardiogenic shock and pt was given inotropes however this was d/c due to tachycardia. He received an extensive infectious w/u which included a gallium scan c/w LLE cellulitis and a TTE w/o vegetations. Patient’s course was c/b worsening renal function and eventual anuria, for which renal was consulted. HD cath was placed and pt began CVVD and eventually transitioned to intermittent HD. His course was further c/b b/l pulmonary effusions requiring R chest tube placement. Given extensive cardiac hx and component of cardiogenic shock, cardiology was consulted and pt started on vasopressin for blood pressure and afterload reducers however unable to tolerate afterload reduction. Transient c/f HIT given platelet drop while on hep gtt, however ruled out and pt transitioned back to hep gtt and then bridged to Coumadin for AFib AC. Patient’s course was further c/b stroke when patient became unresponsive with decreased use of L side (stroke code -- CT negative, MR showing chronic infarcts and possible small brainstem stroke) requiring intubation for airway protection. His mental status did not improve after starting modafinil, vEEG done to r/o seizures showing slowing but no epileptiform discharges. Patient trach’d and PEG’d because unable to wean off vent and unable to mentate enough to swallow. Pt later found to be fungemic with candida tropicalis and treated with micafungin then narrowed to fluconazole. RUKHSANA at that time negative for vegetations or AICD infection. CT A/P performed to evaluate source which was unrevealing. HD cath removed and replaced after surveillance cultures remained negative. Course further complicated by Klebsiella bacteremia, treated with meropenem then de-escalated to Zosyn and de-escalated further to CTX. Upon weaning from pressors, pt started on hydrocortisone and midodrine. Pt with persistent fevers, HD cath again removed 4/19 and replaced with temporary HD cath. Patient then noted to have seizure like activity and started on Keppra. Pt also subsequently found to have septic shock 2/2 aspiration PNA, s/p treatment with Zosyn and pressors. Pt again weaned off pressors to stress dose steroids and midodrine for adrenal insufficiency. Patient unable to be weaned to trach collar and continued on mechanical ventilation. HD was continued on HD and decided (after family discussion) to discontinue HD, with  end goal of L-TACH. Patient made DNR with further discussion with family regarding goals of care- but continued escalation of care, with pressors, etc. Course then c/b sepsis 2/2 candidal UTI and sacral decub ulcer with e/o osteomyelitis and abscess. Plastic surgery involved and debrided ulcer, but assessment was that ulcer was unstageable and incurable. Patient was deemed not a surgical candidate based on his comorbidities and inability lay in prone positioning post-op ([id number]). Multidisciplinary family meeting held involving social work, medical team, nursing staff to discuss utility in continuation of abx therapy given the complexity of the patients condition and poor prognosis. Family shared that they understand that pt is not improving despite all effort and made the decision to pursue comfort measures including no MEWS, escalation of care or continuation of antibiotic therapy. AICD was turned off, and antibiotics discontinued. Patient received last HD on Saturday 6/23/18. with plan for  comfort care. Attempts made to wean patient to trach collar daily for potential placement in hospice facility  . Patient was found by nurse to be unresponsive, breathing with vent, pupils fixed and dilated. corneal reflex absent bilaterally. carotid , femoral , radial pulses absent. no cardiac sounds on auscultation. family was notified and arrived at pt's bedside shortly after.

## 2018-07-01 NOTE — PROVIDER CONTACT NOTE (OTHER) - ACTION/TREATMENT ORDERED:
MD notified. will evaluate patient. Awaiting further orders
no interventions per MD
no interventions per MD
EKG ordered, Seroquel to be ordered depending on EKG and QT. Will continue to monitor patient
6U regular insulin SC inj
No further actions ordered at this time.
No further actions ordered at this time.
Recheck FS in 1 hour as per MD Juárez order
SOFIE Bedolla made aware and present at bedside to assess patient. No further interventions made at this time. Will continue to monitor.
give insulin coverage dosage according to ISS and nutritional insulin dose
no further interventions at this time will continue to assess daily and intervene as needed

## 2018-07-01 NOTE — PROVIDER CONTACT NOTE (OTHER) - RECOMMENDATIONS
Relaxing agent
Interventions pending, will continue to monitor.
MD to be aware.
contact family; see patient at bedside STAT. Respiratory at bedside.
give insulin correctional dose
give insulin coverage
give insulin coverage dosage according to ISS and nutritional insulin dose
renal ultrasound vs urology consult

## 2018-07-01 NOTE — PROVIDER CONTACT NOTE (OTHER) - SITUATION
Noted respiratory rate of 10, pale, unable to obtain vital signs; Unresponsive to sternal rub, pupils dilated and not responsive to light.

## 2018-07-01 NOTE — PROVIDER CONTACT NOTE (OTHER) - REASON
Respiratory rate- 10; Patient pale, no vital signs, distended abdomen. Unresponsive to sternal rub, pupils dilated to 4

## 2018-07-01 NOTE — DISCHARGE NOTE FOR THE EXPIRED PATIENT - SECONDARY DIAGNOSIS.
Acute kidney injury superimposed on CKD Acute on chronic systolic heart failure Acute respiratory failure, unspecified whether with hypoxia or hypercapnia Afib Anemia Altered mental status, unspecified altered mental status type Bacteremia Cardiogenic shock Chronic respiratory failure

## 2018-07-02 NOTE — CHART NOTE - NSCHARTNOTEFT_GEN_A_CORE
Patient originally signed for autopsy, In discussion with the daughter and the son, both agree to NOT go forward with autopsy and instead would like their father to be buried without autopsy. The conversation with the daughter was witnessed by palliative team members including Jacy Mcduffie and the conversation with the son was witnessed by medical student Michelle.

## 2018-07-07 LAB
CULTURE RESULTS: SIGNIFICANT CHANGE UP
SPECIMEN SOURCE: SIGNIFICANT CHANGE UP

## 2018-08-01 NOTE — PROGRESS NOTE ADULT - SUBJECTIVE AND OBJECTIVE BOX
Patient Education     Cortisone Injections  Cortisone is a type of steroid. It can greatly reduce swelling, redness, and irritation (inflammation) and pain. Being injected with cortisone is simple and doesn’t take long. Your doctor may ask you questions about your health. Certain health conditions, such as diabetes, can be affected by cortisone.     Your pain may be relieved by a cortisone injection.   Why have a cortisone injection?  Injecting cortisone can relieve pain for anything from a sports injury to arthritis. Your doctor may suggest an injection if rest, splints, or oral medicine doesn’t relieve your pain. Injecting cortisone is simpler than having surgery. And cortisone may provide the lasting pain relief that can help you get out and enjoy life again.  Getting the injection  Your doctor will start by cleaning and occasionally numbing your skin at the injection site. Next you’ll be injected with local anesthetics (for short-term pain relief) and cortisone. The injection may last a few moments. A small bandage will be put over the injection site. You’ll then be ready to go home.  After your injection  After being injected, make sure you don’t injure the treated region. But stay active. Enjoy a walk or some other mild activity. Just be careful not to strain the region that gave you trouble.  The next day  Some people feel more pain after being injected. This is normal, and it will go away soon. Applying ice for 20 minutes at a time to your injury may reduce the increased pain. Rest for the first day or two. You don’t need to stay in bed. But avoid tasks that may strain the injured region.  If you have diabetes  Cortisone injections can cause blood sugar to be increased for several days after the injection. If you have diabetes, you should follow your blood  sugar closely during this time. Follow your regular plan for what to do when your blood sugar is elevated.   © 3816-0704 [employer] [address]  [place] [address]. All rights reserved. This information is not intended as a substitute for professional medical care. Always follow your healthcare professional's instructions.            Recent events noted. CT head today showed no acute change - chronic b/l occipital infarcts. Being treated for candida fungemia of unknown source. RUKHSANA was unremarkable.     Exam:  MS: nods head "yes" when asked if he can hear examiner, does not follow commands  CN: does not blink to threat, no roving eye movements as was present earlier  Motor: increased tone throughout, left arm extended right arm flexed, moves right arm spontaneously somewhat, does not move left arm or legs    MRI brain from 2 weeks ago addended - disc extrusion at C3 contacting and potentially impinging upon spinal cord

## 2018-08-15 NOTE — PROGRESS NOTE ADULT - ATTENDING COMMENTS
How Severe Are Your Spot(S)?: moderate Have Your Spot(S) Been Treated In The Past?: has not been treated Patient seen and examined with house-staff during bedside rounds.  Resident note read, including vitals, physical findings, laboratory data, and radiological reports.   Revisions included below.  Direct personal management at bed side and extensive interpretation of the data.  Plan was outlined and discussed in details with the housestaff.  Decision making of high complexity  Action taken for acute disease activity to reflect the level of care provided:  - medication reconciliation  - review laboratory data  - insert HD catheter for continuos dialysis for clearance  - discussed shock cardiac and septic and management.  taper levo first  - weaning of oxygen  - no need for RUKHSANA  - Continue antibiotics  - continue diet  - accept lower BP parameters matching base line  - evaluated pleural effusion and need for continuos CT drainage  - monitor loculated PTX Hpi Title: Evaluation of Skin Lesions Additional History: Pain 0/10.

## 2018-09-01 NOTE — BEHAVIORAL HEALTH ASSESSMENT NOTE - SUICIDALITY
2115: Reported updated bp's to Dr Villalpando, received orders for hypertension protocol as noted,  will round on pt in the morning.  
9707- report received from Denise Matthews RN. Assumed care of pt.   0745- Pt sleeping with her  at bedside in crib/ arroused for assessment. Denies pain or headache or blurred vision. Pt wants to go home. Came for office visit yesterday and her BP was elevated. Pt was on Labetelol at home every 12hrs/ changed to Procardia 60mg po daily and labetelol IV as needed for elvated BP. No edema apparent/ DTR 2+. Neg Clonus. IV lock rt AC- no redness or swelling.  0800- Reg diet encouraged. Appetite good.   0910-Procardia 60mg po as ordered daily. Pt advised to rest due to evelated BP now- 150/97. Will allow procardia to work on bringing BP down and retake.   09- Dr Dennison here to visit pt. Pt very anxious and desires to go home. Dr Dennison advised pt that if Procardia brings her BP down as it is supposed to she will be discharged today with RX for Procardia 60mg po daily and to follow up with Dr Denson in 1 week. Pt advised to take her BP daily and if systolic over 160 or diastolic 110 or greater - rest on side and retake in 30 min. If still elevated- return to hospital. Pt instructed on taking Procardia 60mg po daily until her appt with Jarett.     1300- B/P 140/87 pt prepared for discharge. Pt has baby stroller for baby and is parked in "[employer] parking lot.   1330- IV discontinued from rt AC area and pressure dressing applied to site.   1400- Discharge instructions given to pt- pt reports understanding. Will follow up with Dr Denson next week.  1410- Discharged to home ambulatory with Lala pct- will ambulate her to car parked in "[employer] parking lot. Pt has RX for Procardia 60mg orally daily she will drop off at her pharmacy in [place] and take daily in AM. Pt to call office to schedule appt with Dr Denson for Friday or next week.     
None known in lifetime

## 2018-09-10 NOTE — PROGRESS NOTE ADULT - ASSESSMENT
63M PMH HFrEF 10-15% (ischemic), MI, s/p AICD vs PPM, possible Afib, HTN, DM2 on insulin, possible CKD, and gout, who presented with a chief complaint of generalized weakness s/p septic shock likely 2/2 LE cellulitis complicated by ATN requiring dialysis. Course also c/b AMS likely from brainstem infarct 2/2 LV thrombus and/or toxic metabolic encephalopathy and found to have candidemia and sepsis 2/2 klebsiella bacteremia s/p fluconazole and CTX and de-lining. Pending dispo for L-TACH at new baseline mental status. Pt on hep bridge to coumadin for afib and LV thrombus. Currently ventilator dependent tolerating daily CPAP trials and with resolved septic shock from aspiration pneumonia. Determined stable for transfer to [facility]. see chart

## 2018-10-11 NOTE — PROGRESS NOTE ADULT - PROBLEM SELECTOR PLAN 3
Likely related to cardio-renal syndrome. Increased Concerns given the need fr diuresis at this time, which makes worsening renal function more of a possibility.   Will avoid other nephrotoxic agents Low K/Low phos/renal feeding  Continue Hectorol during next dialysis

## 2019-01-01 NOTE — CONSULT NOTE ADULT - CONSULT REQUESTED DATE/TIME
11-Mar-2018 13:38
03-Apr-2018 14:21
03-Apr-2018 16:00
09-Apr-2018 13:00
10-Mar-2018 15:53
11-Jun-2018 16:04
11-May-2018 09:36
16-Mar-2018 14:25
19-Mar-2018 13:01
20-Mar-2018 10:15
20-Mar-2018 12:53
22-Mar-2018 18:04
23-Mar-2018 12:00
23-May-2018 15:55
26-Apr-2018 13:00
26-Apr-2018 19:42
Statement Selected
08-Jun-2018 11:32
10-Mar-2018 17:45
25-May-2018 10:54

## 2019-05-17 NOTE — PROGRESS NOTE ADULT - PROBLEM SELECTOR PROBLEM 3
Altered mental status, unspecified altered mental status type POST-OP DIAGNOSIS:  Breast hematoma 17-May-2019 16:58:09  Lenny Dee  H/O breast augmentation 17-May-2019 16:57:52  Lenny Dee

## 2019-07-19 NOTE — PROGRESS NOTE ADULT - PROBLEM SELECTOR PLAN 4
Patient's Calcium 8.8 and phosphorus 6.6 at present.  Will continue ergo for now.  Can add Phoslo 667 mg po tid for now.  Monitor Calcium and phophorus level. oral

## 2019-10-28 NOTE — PROGRESS NOTE ADULT - PROBLEM SELECTOR PLAN 3
SUBJECTIVE:   Gilberto Long is a 13 year old male who presents to clinic today with father because of:    Chief Complaint   Patient presents with     Fever     Cough        HPI   ENT/Cough Symptoms    Problem started: 8 days ago  Fever: Yes - Highest temperature: 100.6 F   Runny nose: YES  Congestion: YES  Sore Throat: YES  Cough: YES  Eye discharge/redness:  no  Ear Pain: no  Wheeze: no   Sick contacts: Family member (Parents and Sibling);  Strep exposure: None;  Therapies Tried: ibuprofen    Cough with runny nose and congestion for the past 8 days. Also had a sore throat with trouble swallowing. Has had headaches, abdominal pain. Vomiting yesterday after dinner with bout of cough. Took some ibuprofen. No diarrhea. Everyone at home had some URI symptoms. Mother had sinus infection. History of chronic migraines and has medicine available for relief. No medication allergies. Up to date with shots.       Constitutional, eye, ENT, skin, respiratory, cardiac, GI, MSK, neuro, and allergy are normal except as otherwise noted.    PROBLEM LIST  Patient Active Problem List    Diagnosis Date Noted     Migraine without status migrainosus, not intractable, unspecified migraine type 12/18/2018     Priority: Medium     Underweight 09/21/2018     Priority: Medium     Primary nocturnal enuresis 02/06/2016     Priority: Medium     ADHD (attention deficit hyperactivity disorder), inattentive type 02/03/2016     Priority: Medium     Diagnosed with ADHD on 02/03/2016 by Dr. Mcmullen            MEDICATIONS  guanFACINE (INTUNIV) 1 MG TB24 24 hr tablet, Take 1 tablet (1 mg) by mouth At Bedtime (Patient taking differently: Take 2 mg by mouth At Bedtime )  rizatriptan (MAXALT-MLT) 5 MG ODT, Take 1 tablet (5 mg) by mouth at onset of headache for migraine  desmopressin (DDAVP) 0.2 MG tablet, Take 1 tabs before bed. May titrate to a maximum of 3 tabs before bed. (Patient not taking: Reported on 10/28/2019)    No current facility-administered  "medications on file prior to visit.       ALLERGIES  No Known Allergies    Reviewed and updated as needed this visit by clinical staff  Tobacco  Meds  Med Hx  Surg Hx  Fam Hx  Soc Hx        Reviewed and updated as needed this visit by Provider       OBJECTIVE:     BP 96/52   Pulse 109   Temp 99.5  F (37.5  C) (Temporal)   Resp 20   Ht 5' 0.63\" (1.54 m)   Wt 76 lb 4 oz (34.6 kg)   SpO2 93%   BMI 14.58 kg/m    39 %ile based on CDC (Boys, 2-20 Years) Stature-for-age data based on Stature recorded on 10/28/2019.  6 %ile based on CDC (Boys, 2-20 Years) weight-for-age data based on Weight recorded on 10/28/2019.  <1 %ile based on CDC (Boys, 2-20 Years) BMI-for-age based on body measurements available as of 10/28/2019.  Blood pressure percentiles are 16 % systolic and 22 % diastolic based on the August 2017 AAP Clinical Practice Guideline.     GENERAL: Active, alert, in no acute distress.  SKIN: Clear. No significant rash, abnormal pigmentation or lesions  HEAD: Normocephalic.  EYES:  No discharge or erythema. Normal pupils and EOM.  EARS: Normal canals. Tympanic membranes are normal; gray and translucent.  NOSE: Normal without discharge.  MOUTH/THROAT: Clear. No oral lesions. Teeth intact without obvious abnormalities. Posterior oropharynx with mild erythema  LUNGS: Clear. No rales, rhonchi, wheezing or retractions  HEART: Regular rhythm. Normal S1/S2. No murmurs.  ABDOMEN: Soft, non-tender, not distended, no masses or hepatosplenomegaly. Bowel sounds normal.     DIAGNOSTICS:   Diagnostics:   Results for orders placed or performed in visit on 10/28/19 (from the past 24 hour(s))   Strep, Rapid Screen   Result Value Ref Range    Specimen Description Throat     Rapid Strep A Screen (A)      POSITIVE: Group A Streptococcal antigen detected by immunoassay.       ASSESSMENT/PLAN:   Gilberto is a 13 year old male who presents with sore throat and fever. Rapid strep test was positive.  He shows no evidence of pneumonia, " meningitis, bacteremia, urinary tract infection, strep pharyngitis, acute abdomen, or other more serious cause of his symptoms.  He is not dehydrated.      Diagnoses and all orders for this visit:    Streptococcal pharyngitis  -     amoxicillin (AMOXIL) 500 MG capsule; Take 1 capsule (500 mg) by mouth 2 times daily for 10 days        -     Encourage fluids        -     Acetaminophen or ibuprofen as needed for pain or fever        -     Can use humidifier in bedroom at night to help with breathing    Throat pain  -     Strep, Rapid Screen    Follow up: in clinic with PCP if he is not improving in 3-5 days or sooner in the ED if vomiting persistently, he won't drink, he has evidence of dehydration, he gets a stiff neck, he has trouble breathing, he feels much worse, or any other concerns    Patient instructions: please refer to section in the chart.     Stephane Mtz MD        DM2 on Januvia at home. HbA1C 8.6  - c/w regular insulin 17u q6 hrs and Lantus 7units QHS  - ISS

## 2019-11-05 NOTE — PROGRESS NOTE ADULT - PROBLEM SELECTOR PLAN 8
[Erythematous Oropharynx] : erythematous oropharynx [Enlarged Tonsils] : enlarged tonsils  [NL] : warm CHF with EF 10-15% 2/2 ischemic cardiomyopathy s/p AICD. Elevated BNP on admission with R sided effusion and edema. Patient with persistent pleural effusions on CXR and US and b/l dependent edema.   - c/w holding ACE/BB in setting of sepsis/ hypotension on midodrine  - HD for fluid balance, though difficulty tolerating. Plan for HD today.    #CAD- Hx of MI and ischemic CM s/p AICD, STEMI 7/2017, was started on Aspirin and Brilinta.   - c/w aspirin 81 and Atorvastatin 80mg qhs. CHF with EF 10-15% 2/2 ischemic cardiomyopathy s/p AICD. Elevated BNP on admission with R sided effusion and edema. Patient with persistent pleural effusions on CXR and US and b/l dependent edema.   - c/w holding ACE/BB in setting of sepsis/ hypotension on midodrine      #CAD- Hx of MI and ischemic CM s/p AICD, STEMI 7/2017, was started on Aspirin and Brilinta.   - c/w aspirin 81 and Atorvastatin 80mg qhs.

## 2020-06-22 NOTE — PROGRESS NOTE ADULT - PROBLEM SELECTOR PLAN 3
Acute change in mental status likely 2/2 brainstem infarct with component of encephalopathy from infection. 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 Per neurology may have had ischemic event from hypoperfusion. Also showing disc protrusion at C3/C4 level coursing superiorly to the C2/C3 contacting the ventral spinal cord. Per neurology, this may be contributing to weakness, however would not explain change in mental status.  Exam limited- minimal responsiveness to verbal painful stimuli.   - c/w Modafinil  - MRI head repeat for prognostication of CVA- ordered    #Seizures- c/w keppra 500 mg qd with extra 250 mg post HD days Detail Level: Detailed Quality 130: Documentation Of Current Medications In The Medical Record: Current Medications Documented Quality 110: Preventive Care And Screening: Influenza Immunization: Influenza Immunization not Administered because Patient Refused. Quality 402: Tobacco Use And Help With Quitting Among Adolescents: Patient screened for tobacco and never smoked Quality 131: Pain Assessment And Follow-Up: Pain assessment using a standardized tool is documented as negative, no follow-up plan required

## 2020-06-24 NOTE — PROGRESS NOTE ADULT - PROBLEM SELECTOR PLAN 7
Reviewed MD/NP notes and discussed patient status w/ RN.  Pt continues to be followed by PT/OT - see their notes.     WBC 1.1, ANC 0.6.  Hemaglobin 6.8, plts 4K.  Patient has bilat LE edema.  Echo ordered.     Update provided to [facility] RN liaison.     Hx of Afib and LV thrombus on Coumadin prior to admission. Most recent TTE with [facility] shows no LV thrombus currently.   - Not currently on rate control as HR has remained stable but has received Lopressor pushes for HR goal <110  - dose Coumadin based on INR daily INR 3.10 this AM - will hold dose tonight    #LV thrombus  LV thrombus noted on RUKHSANA on 4/10. Remains therapeutic on warfarin.  - Dose Coumadin based on INR

## 2020-09-04 NOTE — PROGRESS NOTE ADULT - PROBLEM SELECTOR PLAN 9
testicular pain CHF with EF 10-15% 2/2 ischemic cardiomyopathy s/p AICD. Patient with persistent pleural effusions on CXR and US and b/l dependent edema.  - HOLDING ACE/BB in setting of hypotension  - c/w midodrine TID  - c/w HD to maintain fluid balance    #CAD- Hx of MI and ischemic CM s/p AICD, STEMI 7/2017.  - c/w aspirin 81 and Atorvastatin 80mg qhs

## 2021-03-09 NOTE — PROGRESS NOTE ADULT - PROVIDER SPECIALTY LIST ADULT
MICU Minocycline Pregnancy And Lactation Text: This medication is Pregnancy Category D and not consider safe during pregnancy. It is also excreted in breast milk.

## 2021-03-17 NOTE — PROGRESS NOTE ADULT - PROBLEM SELECTOR PROBLEM 3
[FreeTextEntry1] : The patient never had cardiovascular problems in the past.  The patient underwent Covid vaccine inoculation.  Shortly thereafter the patient developed armpit and chest discomfort.  Chest discomfort is not consistently related exertion.  At times the patient has good exercise capacity without exertional symptoms.  Sometimes his chest hurts during exertion and sometimes his chest hurts at rest.  It seems to be getting better.  There was a question of T wave inversion in the ECG done in the emergency department.  The patient was referred for cardiology evaluation. Anemia due to chronic kidney disease

## 2021-06-16 NOTE — PRE-OP CHECKLIST - ISOLATION PRECAUTIONS
Assessment:     1. Epistaxis  HM1(CBC and Differential)    INR    HM1 (CBC with Diff)    Ambulatory referral to ENT       Plan:     1. Epistaxis  This workup will be carried out at the emergency room at [facility]; the patient was referred down there because of continued oozing from an anterior nosebleed which we were able to pack here and stem the tide as it were patient has discontinued her baby aspirin and her mesalamine her other medications have been reviewed  - HM1(CBC and Differential)  - INR  - HM1 (CBC with Diff)  - Ambulatory referral to ENT      Subjective:   Natasha is been under my care for a long time recently treated for IBS by Dr. Parmar with mesalamine this is the only new medication patient has had a nosebleed which is been able to stop but has been awakening her at night sometimes 5 days out of 7 she has decided that this is enough she presents today for examination her blood pressure was normal I had planned on doing clotting studies I inserted a nasal speculum and her nose began to bleed immediately I did see some anterior bruising from the turbinates we were able to stem the tide of the bleeding with an anterior Vaseline pack I did not use any medication however this is only a temporizing measure I tried to contact your nose and throat they were unable to see her today I am uncomfortable sending her home with this situation we will send her down to the emergency room I talked with the emergency room physician and we plan on examining her placing a balloon catheter and/or cauterizing and/or getting a consult to come down to see her.  I did do a medication review patient otherwise feels well follow-up next week for complete physical as she is behind in her healthcare maintenance.  No    Review of Systems: A complete 14 point review of systems was obtained and is negative or as stated in the history of present illness.    Past Medical History:   Diagnosis Date     High cholesterol      Hypertension  "     Pancreatitis      Family History   Problem Relation Age of Onset     Early death Mother      Cancer Father      No Medical Problems Brother      Breast cancer Other      Past Surgical History:   Procedure Laterality Date     CHOLECYSTECTOMY       JOINT REPLACEMENT       SPINE SURGERY       Social History   Substance Use Topics     Smoking status: Former Smoker     Types: Cigarettes     Quit date: 1997     Smokeless tobacco: Never Used     Alcohol use No      Comment: very rre social drink         Objective:   /70  Pulse 68  Ht 5' 0.5\" (1.537 m)  Wt (!) 235 lb 12.8 oz (107 kg)  BMI 45.29 kg/m2    General Appearance:  Alert, cooperative, no distress  Head:  Normocephalic, no obvious abnormality  Ears: TM anatomy normal  Eyes:  PERRL, EOM's intact, conjunctiva and corneas clear  Nose:  Nares symmetrical, septum midline, patient has an anterior nosebleed with moderate wheezing we will were able to stop it with a anterior pack however she will need definitive treatment she was referred to the emergency room physician who I discussed this with will probably use a balloon catheter and/or cautery clotting studies will be done down there.  Throat:  Lips, tongue, and mucosa are moist, pink, and intact  Neck:  Supple, symmetrical, trachea midline, no adenopathy; thyroid: no enlargement, symmetric,no tenderness/mass/nodules; no carotid bruit, no JVD  Back:  Symmetrical, no curvature, ROM normal, no CVA tenderness  Chest/Breast:  No mass or tenderness  Lungs:  Clear to auscultation bilaterally, respirations unlabored   Heart:  Normal PMI, regular rate & rhythm, S1 and S2 normal, no murmurs, rubs, or gallops  Abdomen:  Soft, non-tender, bowel sounds active all four quadrants, no mass, or organomegaly  Musculoskeletal:  Tone and strength strong and symmetrical, all extremities  Lymphatic:  No adenopathy  Skin/Hair/Nails:  Skin warm, dry, and intact, no rashes  Neurologic:  Alert and oriented x3, no cranial nerve " deficits, normal strength and tone, gait steady  Extremities:  No edema.  Cinthia's sign negative.    Genitourinary: deferred  Pulses:  Equal bilaterally     The following high BMI interventions were performed this visit: weight monitoring      This note has been dictated using voice recognition software. Any grammatical or context distortions are unintentional and inherent to the the software.    none

## 2022-06-07 NOTE — PROGRESS NOTE ADULT - PROBLEM SELECTOR PROBLEM 8
Anemia Doxepin Pregnancy And Lactation Text: This medication is Pregnancy Category C and it isn't known if it is safe during pregnancy. It is also excreted in breast milk and breast feeding isn't recommended.

## 2022-08-30 NOTE — PROGRESS NOTE ADULT - PROBLEM SELECTOR PLAN 1
INTERVAL Hx:  Records and clinical information were reviewed. April states that she has had 2 doses of miralax, but has still been unable to have a bowel movement. She denies SI/Hi/AVH currently. She states that her mood is good. She endorses eating and sleeping well. Will add dulcolax suppository. 08/29 - the patient slept 7 hours overnight. She got Klonopin and Trazodone for anxiety and insomnia. She is c/o constipation but denies significant mood symptoms, no SI/HI/AVH. Patient medication compliant and able to make her needs known. She is amenable to trying a robust agent for constipation. Disposition pending. MEDS:              Current Facility-Administered Medications   Medication Dose Route Frequency    bisacodyL (DULCOLAX) tablet 5 mg  5 mg Oral DAILY PRN    traZODone (DESYREL) tablet 100 mg  100 mg Oral QHS PRN    clonazePAM (KlonoPIN) tablet 0.5 mg  0.5 mg Oral BID PRN    loxapine (LOXITANE) capsule 50 mg  50 mg Oral QHS    ibuprofen (MOTRIN) tablet 400 mg  400 mg Oral Q8H PRN    magnesium hydroxide (MILK OF MAGNESIA) 400 mg/5 mL oral suspension 30 mL  30 mL Oral DAILY PRN    alum-mag hydroxide-simeth (MYLANTA) oral suspension 30 mL  30 mL Oral Q4H PRN    carBAMazepine (TEGretol) tablet 200 mg  200 mg Oral BID    cholecalciferol (VITAMIN D3) (1000 Units /25 mcg) tablet 5,000 Units  5,000 Units Oral Q7D    citalopram (CELEXA) tablet 40 mg  40 mg Oral DAILY    pantoprazole (PROTONIX) tablet 40 mg  40 mg Oral ACB    hydrOXYzine pamoate (VISTARIL) capsule 50 mg  50 mg Oral TID PRN    metroNIDAZOLE (FLAGYL) tablet 500 mg  500 mg Oral BID    nicotine (NICODERM CQ) 21 mg/24 hr patch 1 Patch  1 Patch TransDERmal DAILY PRN         VITALS: No data found.         LABS: .  Recent Results             Recent Results (from the past 24 hour(s))   METABOLIC PANEL, BASIC     Collection Time: 08/26/22  6:30 AM   Result Value Ref Range     Sodium 138 136 - 145 mmol/L     Potassium 4.4 3.5 - 5.1 mmol/L     Chloride 102 97 - 108 mmol/L     CO2 29 21 - 32 mmol/L     Anion gap 7 5 - 15 mmol/L     Glucose 108 (H) 65 - 100 mg/dL     BUN 8 6 - 20 MG/DL     Creatinine 0.69 0.55 - 1.02 MG/DL     BUN/Creatinine ratio 12 12 - 20       GFR est AA >60 >60 ml/min/1.73m2     GFR est non-AA >60 >60 ml/min/1.73m2     Calcium 8.9 8.5 - 10.1 MG/DL            MSE:   Appearance: casually dressed; fair grooming  Behavior: isolative but cooperative; no agitation  Motor: less restless  Mood/Affect: minimally dysphoric; less labile  Thought Process: coherent; organized  Thought Content: no delusions; no SI/HI  Perceptions: no hallucinations  Insight/Judgment: fair     ASSESSMENT:   1. Bipolar disorder, depressed, severe (F31.4)  2. Cocaine Use, episodic (F14.10)     PLAN:  1. Continue the Loxapine at 50 mg QHS--may have to decrease if sedation persists. 2.  Continue the Tegretol at 200 mg BID. 3.  Continue the Celexa at 40 mg daily. 4.  Continue the PRN Trazodone and Clonazepam.  5.  ELOS: 3-4 days--F/U with Dr. Jennifer Mann in [address] [place] [address]. Course complicated by septic shock 2/2 to aspiration with increasing O2 requirements for which patient was back in MICU. Tx with Zosyn. Resolving septic shock-requiring levophed. No longer on pressors. Pending wean from Solucortef.   -c/w Zosyn     #Bacteremia  Noted to have Klebsiella bacteremia with associated Klebsiella in urine and sputum for which completed ceftriaxone course (4/15-4/26). Resolved.    #Fungemia  Noted to have Candida Tropicalis growing in blood cultures and completed fluconazole course. Course complicated by septic shock 2/2 to aspiration with increasing O2 requirements for which patient was back in MICU. Tx with Zosyn. Resolving septic shock-requiring levophed. No longer on pressors. Pending wean from Solucortef.   -c/w Zosyn     #Bacteremia  Noted to have Klebsiella bacteremia with associated Klebsiella in urine and sputum for which completed ceftriaxone course (4/15-4/26). Resolved.    #Fungemia  Noted to have Candida Tropicalis growing in blood cultures and completed fluconazole course. Resolved.

## 2022-10-11 NOTE — PROCEDURE NOTE - NSANTIMICROB_VASC_A_CORE
Yes
No

## 2022-11-04 NOTE — PROGRESS NOTE ADULT - PROBLEM SELECTOR PLAN 2
Detail Level: Detailed Scalpel Type: 15 blade Biopsy Type: H and E Depth Of Biopsy: adipose tissue Was An Eye Clamp Used?: No Eye Clamp Note Details: An eye clamp was used during the procedure. Anesthesia Type: 1% lidocaine with 1:100,000 epinephrine and 408mcg clindamycin/ml Anesthesia Volume In Cc: 5 DM2 on Januvia at home. HbA1C 8.6. Difficulty controlling glucose levels with multiple episodes of hypoglycemia and hyperglycemia. symptomatically hypoglycemic on glucerna (avoid).    - c/w regular insulin 8U q6 --> titrate as needed  - SSI Hemostasis: Electrocautery Deep Sutures: 4-0 Maxon Epidermal Sutures: Dermabond Wound Care: Petrolatum Size Of Lesion In Cm (Optional): 2 X Size Of Lesion In Cm (Optional): 1 Lab: [id number] Lab Facility: [id number] Path Notes (To The Dermatopathologist): Please section longitudinally.  DO NOT BREADLOAF. Consent: Written consent was obtained and risks were reviewed including but not limited to scarring, infection, bleeding, scabbing, incomplete removal, nerve damage and allergy to anesthesia. Post-Care Instructions: I reviewed with the patient in detail post-care instructions. Patient is to keep the biopsy site dry overnight, and then apply bacitracin twice daily until healed. Patient may apply hydrogen peroxide soaks to remove any crusting. Notification Instructions: Patient will be notified of biopsy results. However, patient instructed to call the office if not contacted within 2 weeks. Billing Type: Third-Party Bill

## 2022-12-20 NOTE — PROGRESS NOTE ADULT - PROBLEM SELECTOR PROBLEM 4
[Time Spent: ___ minutes] : I have spent [unfilled] minutes of time on the encounter. Chronic systolic heart failure

## 2023-02-07 NOTE — PROGRESS NOTE ADULT - PROBLEM SELECTOR PLAN 1
R, worsening when compared w CXR from a few days back, in the setting of HD w/o sufficient fluid removal, anasarca.  Bedside US performed by me - free flowing simple fluid above and below the diaphragm. No evidence of stranding or sediment in pleural effusion. Some fibrin strands noted in ascitic fluid above the liver.   Fluid is mostly anterior w/o great pocket for a chest tube. Given the fact that he is doing better today and tolerating trach collar, will hold off on placing a chest tube. John Adler MD

## 2023-03-24 NOTE — PROGRESS NOTE ADULT - NSHPATTENDINGPLANDISCUSS_GEN_ALL_CORE
as above Nasal Turnover Hinge Flap Text: The defect edges were debeveled with a #15 scalpel blade.  Given the size, depth, location of the defect and the defect being full thickness a nasal turnover hinge flap was deemed most appropriate.  Using a sterile surgical marker, an appropriate hinge flap was drawn incorporating the defect. The area thus outlined was incised with a #15 scalpel blade. The flap was designed to recreate the nasal mucosal lining and the alar rim. The skin margins were undermined to an appropriate distance in all directions utilizing iris scissors.

## 2023-04-24 NOTE — PROGRESS NOTE ADULT - PROBLEM SELECTOR PROBLEM 3
Hypotension Topical Metronidazole Pregnancy And Lactation Text: This medication is Pregnancy Category B and considered safe during pregnancy.  It is also considered safe to use while breastfeeding.

## 2023-08-22 NOTE — PROGRESS NOTE ADULT - ASSESSMENT
63 M with hx of MI, CHF EF(10-15%), DM type 2, HTN, CKD, Gout p/w septic shock and bilateral LL cellulitis, tachycardic, WBC 32, lactate 6. CT of LL did not revealed any gas/collections. Repeat CT 3/12 negative for nec fasc, Duplex US negative for DVT b/l, whole body gallium scan pending. WBC persistently elevated. Afebrile.    Plan:  - Refusing any amputation options  -F/u gallium scan   -Continue care per primary team  -Vascular will follow, please call [phone] for questions or acute changes No

## 2023-10-25 NOTE — OCCUPATIONAL THERAPY INITIAL EVALUATION ADULT - DIAGNOSIS, OT EVAL
Decreased independence with ADL and functional mobility. Path Notes (To The Dermatopathologist): Please check for PNI

## 2023-11-30 NOTE — PROGRESS NOTE ADULT - SUBJECTIVE AND OBJECTIVE BOX
INTERVAL HPI/OVERNIGHT EVENTS:    SUBJECTIVE: Patient seen and examined at bedside.    OBJECTIVE:  VITAL SIGNS:  ICU Vital Signs Last 24 Hrs  T(C): 37.1 (21 May 2018 06:00), Max: 37.1 (20 May 2018 09:57)  T(F): 98.7 (21 May 2018 06:00), Max: 98.8 (20 May 2018 09:57)  HR: 88 (21 May 2018 06:16) (82 - 94)  BP: 91/70 (21 May 2018 04:43) (90/69 - 124/72)  BP(mean): 77 (21 May 2018 04:43) (73 - 87)  RR: 11 (21 May 2018 06:16) (11 - 29)  SpO2: 100% (21 May 2018 06:16) (98% - 100%)    Mode: AC/ CMV (Assist Control/ Continuous Mandatory Ventilation), RR (machine): 10, TV (machine): 500, FiO2: 40, PEEP: 5, ITime: 1, MAP: 12, PIP: 24    05-19 @ 07:01  -  05-20 @ 07:00  --------------------------------------------------------  IN: 300 mL / OUT: 2300 mL / NET: -2000 mL      CAPILLARY BLOOD GLUCOSE      POCT Blood Glucose.: 279 mg/dL (21 May 2018 05:57)      PHYSICAL EXAM:  General: NAD  HEENT: NC/AT; PERRL, clear conjunctiva  Neck: supple  Respiratory: CTA b/l  Cardiovascular: +S1/S2; RRR  Abdomen: soft, NT/ND; +BS x4  Extremities: WWP, 2+ peripheral pulses b/l; no LE edema  Skin: normal color and turgor; no rash  Neurological:     MEDICATIONS:  MEDICATIONS  (STANDING):  aspirin  chewable 81 milliGRAM(s) Oral daily  atorvastatin 80 milliGRAM(s) Oral at bedtime  chlorhexidine 0.12% Liquid 15 milliLiter(s) Swish and Spit two times a day  chlorhexidine 2% Cloths 1 Application(s) Topical daily  collagenase Ointment 1 Application(s) Topical daily  dextrose 5%. 1000 milliLiter(s) (50 mL/Hr) IV Continuous <Continuous>  dextrose 50% Injectable 12.5 Gram(s) IV Push once  dextrose 50% Injectable 25 Gram(s) IV Push once  dextrose 50% Injectable 25 Gram(s) IV Push once  ergocalciferol Drops 6000 Unit(s) Oral daily  escitalopram 5 milliGRAM(s) Oral daily  folic acid 1 milliGRAM(s) Oral daily  hydrocortisone 20 milliGRAM(s) Oral every 12 hours  insulin regular  human corrective regimen sliding scale   SubCutaneous every 6 hours  insulin regular  human recombinant 5 Unit(s) SubCutaneous every 6 hours  levETIRAcetam  Solution 500 milliGRAM(s) Oral every 24 hours  metoclopramide 5 milliGRAM(s) Oral every 8 hours  midodrine 15 milliGRAM(s) Oral every 8 hours  modafinil 100 milliGRAM(s) Oral daily  multivitamin 1 Tablet(s) Oral daily  pantoprazole   Suspension 40 milliGRAM(s) Oral daily  silver sulfADIAZINE 1% Cream 1 Application(s) Topical daily    MEDICATIONS  (PRN):  acetaminophen    Suspension. 650 milliGRAM(s) Oral every 6 hours PRN Moderate Pain (4 - 6)  acetaminophen  Suppository 650 milliGRAM(s) Rectal every 6 hours PRN For Temp greater than 38 C (100.4 F)  dextrose Gel 1 Dose(s) Oral once PRN Blood Glucose LESS THAN 70 milliGRAM(s)/deciliter  glucagon  Injectable 1 milliGRAM(s) IntraMuscular once PRN Glucose LESS THAN 70 milligrams/deciliter      ALLERGIES:  Allergies    No Known Allergies    Intolerances        LABS:                        9.1    15.7  )-----------( 133      ( 20 May 2018 07:19 )             29.8     05-20    133<L>  |  92<L>  |  35<H>  ----------------------------<  224<H>  4.3   |  25  |  2.38<H>    Ca    8.6      20 May 2018 07:19  Phos  3.3     05-20  Mg     1.9     05-20      PT/INR - ( 20 May 2018 11:06 )   PT: 24.3 sec;   INR: 2.16          RADIOLOGY & ADDITIONAL TESTS: Reviewed. TRANSFER NOTE/TRANSFER OF CARE ([facility] TO [facility])    HOSPITAL COURSE: 63M PMH HFrEF 10-15% (ischemic), CAD (STEMI per [facility] records 7/17), s/p AICD, Afib, HTN, DM2 (on insulin), CKD, gout admitted for septic shock 2/2 LE cellulitis as well as concern for ATN in setting of shock. Patient was admitted, started on Vanc/Zosyn for broad coverage given steroid use,  and started on levophed for presumed septic shock. However given low mix venous satruation there was c/f component of cardiogenic shock and pt was given inotropes however this was d/c due to tachycardia. Patient completed 11 day course of Vanc/Zosyn. He received an extensive infectious w/u which included a gallium scan c/w LLE cellulitis and a TTE w/o vegetations. Patient’s course was c/b worsening renal function and eventual anuria, for which renal was consulted. HD cath was placed and pt graciela CVVD and eventually transitioned to intermittent HD. His course was further c/b b/l pulmonary effusions requiring R chest tube placement. Given extensive cardiac hx and component of cardiogenic shock, cardiology was consulted and pt started on vasopressin for blood pressure and afterload reducers however unable to tolerate afterload reduction. Transient c/f HIT given platelet drop while on hep gtt, however ruled out and pt transitioned back to hep gtt and then bridged to Coumadin for AFib AC. Patient’s course was further c/b stroke when patient became unresponsive with decreased use of L side (stroke code -- CT negative, MR showing chronic infarcts and possible small brainstem stroke) requiring intubationg for airway protection. His mental status did not improve after starting modafinil, vEEG done to r/o seizures showing slowing but no epileptiform discharges. Patient trach’d and PEG’d because unable to wean off vent and unable to mentate enough to swallow. Pt later found to be fungemic with candida tropicalis and treated with micafungin then narrowed to fluconazole. RUKHSANA at that time negative for vegetations or AICD infection. CT A/P performed to evaluate source which was unrevealing. HD cath removed and replaced after surveillance cultures remained negative. Course further complicated by Klebsiella bacteremia, treated with meropenem then de-escalated to Zosyn and de-escalated further to CTX. Upon weaning from pressors, pt started on hydrocortisone and midodrine. Pt with persistent fevers, HD cath again removed 4/19 and replaced with temporary HD cath. Patient then noted to have seizure like activity and started on Keppra. Pt also subsequently found to have septic shock 2/2 aspiration PNA, s/p treatment with Zosyn and pressors. Pt again weaned off pressors to stress dose steroids and midodrine for adrenal insufficiency. When stepped down from MICU to 7LA, patient began process of vent weaning, began tolerating trach collar. HD was continued per renal without requirement of albumin with end goal of L-TACH. Pt currently being tapered off stress steroids with goal of resuming previous regimen of prednisone 6 mg and fludrocortisone 0.1 mg daily if BP allows. Course c/b hyperglycemia to 280s in setting of resumed on feeds and stress dose steroids, insulin regimen and feeds adjusted with appropriate control of sugars. Also noted to have complications worsening tolerance of HD in setting of worsening tachycardia and decreased pressures for which patient restarted on albumin for HD. Also of note, patient made DNR with further discussion with family regarding goals of care- but continued escalation of care, with pressors, etc. Improving tolerance of HD, eventually weaned off albumin. Continued adjustments for insulin based on requirements and daily dosing of warfarin to maintain INR 2-3. Patient now to get 10mg midodrine pre-HD (needs to be ordered as one time dose pre-HD), and weaning hydrocortisone (most recently weaned to 15 BID). Pt HD stable, ready for SD to [facility], pending d/c to long term care facility with HD and vent weaning. Discussed SD and discharge plans with daughter, Cristal, who is HCP.    INTERVAL HPI/OVERNIGHT EVENTS: Patient was intermittently tachypneic and agitated overnight, given Ativan with relief. Remained on ACVC and comfortable.     SUBJECTIVE: Patient seen and examined at bedside. Awake and alert this morning, appeared comfortable but not following commands. ROS otherwise unobtainable.     OBJECTIVE:  VITAL SIGNS:  ICU Vital Signs Last 24 Hrs  T(C): 37.1 (21 May 2018 06:00), Max: 37.1 (20 May 2018 09:57)  T(F): 98.7 (21 May 2018 06:00), Max: 98.8 (20 May 2018 09:57)  HR: 88 (21 May 2018 06:16) (82 - 94)  BP: 91/70 (21 May 2018 04:43) (90/69 - 124/72)  BP(mean): 77 (21 May 2018 04:43) (73 - 87)  RR: 11 (21 May 2018 06:16) (11 - 29)  SpO2: 100% (21 May 2018 06:16) (98% - 100%)    Mode: AC/ CMV (Assist Control/ Continuous Mandatory Ventilation), RR (machine): 10, TV (machine): 500, FiO2: 40, PEEP: 5, ITime: 1, MAP: 12, PIP: 24    05-19 @ 07:01  -  05-20 @ 07:00  --------------------------------------------------------  IN: 300 mL / OUT: 2300 mL / NET: -2000 mL      CAPILLARY BLOOD GLUCOSE  POCT Blood Glucose.: 279 mg/dL (21 May 2018 05:57)      PHYSICAL EXAM:  General: sitting up in bed, on ACVC, tachypneic, alert and looks to name but does not follow commands  HEENT: NC/AT; PERRL, anicteric sclera; MMM  Neck: +trach; no secretions today  Cardiovascular: +S1/S2; regular and no tachycardia  Respiratory: diminished breath sounds b/l; diffuse b/l crackles; no rhonchi   Gastrointestinal: distended and firm but non-tender and bowel sounds present; PEG in place, no drainage around site, feeds running; no TTP, BSx4  Extremities: WWP; 2+ pitting edema b/l; chronic venous stasis changes bilateral LE   Vascular: palp peripheral pulses b/l  Neurological:  Alert and awake. Appears to respond to name but does not follow commands this morning.  Skin: Chronic venous stasis changes b/l LE with dressings applied by wound care    MEDICATIONS:  MEDICATIONS  (STANDING):  aspirin  chewable 81 milliGRAM(s) Oral daily  atorvastatin 80 milliGRAM(s) Oral at bedtime  chlorhexidine 0.12% Liquid 15 milliLiter(s) Swish and Spit two times a day  chlorhexidine 2% Cloths 1 Application(s) Topical daily  collagenase Ointment 1 Application(s) Topical daily  dextrose 5%. 1000 milliLiter(s) (50 mL/Hr) IV Continuous <Continuous>  dextrose 50% Injectable 12.5 Gram(s) IV Push once  dextrose 50% Injectable 25 Gram(s) IV Push once  dextrose 50% Injectable 25 Gram(s) IV Push once  ergocalciferol Drops 6000 Unit(s) Oral daily  escitalopram 5 milliGRAM(s) Oral daily  folic acid 1 milliGRAM(s) Oral daily  hydrocortisone 20 milliGRAM(s) Oral every 12 hours  insulin regular  human corrective regimen sliding scale   SubCutaneous every 6 hours  insulin regular  human recombinant 5 Unit(s) SubCutaneous every 6 hours  levETIRAcetam  Solution 500 milliGRAM(s) Oral every 24 hours  metoclopramide 5 milliGRAM(s) Oral every 8 hours  midodrine 15 milliGRAM(s) Oral every 8 hours  modafinil 100 milliGRAM(s) Oral daily  multivitamin 1 Tablet(s) Oral daily  pantoprazole   Suspension 40 milliGRAM(s) Oral daily  silver sulfADIAZINE 1% Cream 1 Application(s) Topical daily    MEDICATIONS  (PRN):  acetaminophen    Suspension. 650 milliGRAM(s) Oral every 6 hours PRN Moderate Pain (4 - 6)  acetaminophen  Suppository 650 milliGRAM(s) Rectal every 6 hours PRN For Temp greater than 38 C (100.4 F)  dextrose Gel 1 Dose(s) Oral once PRN Blood Glucose LESS THAN 70 milliGRAM(s)/deciliter  glucagon  Injectable 1 milliGRAM(s) IntraMuscular once PRN Glucose LESS THAN 70 milligrams/deciliter      ALLERGIES:  Allergies    No Known Allergies    Intolerances        LABS:                        9.1    15.7  )-----------( 133      ( 20 May 2018 07:19 )             29.8     05-20    133<L>  |  92<L>  |  35<H>  ----------------------------<  224<H>  4.3   |  25  |  2.38<H>    Ca    8.6      20 May 2018 07:19  Phos  3.3     05-20  Mg     1.9     05-20      PT/INR - ( 20 May 2018 11:06 )   PT: 24.3 sec;   INR: 2.16          RADIOLOGY & ADDITIONAL TESTS: Reviewed. yes

## 2023-12-22 NOTE — PROGRESS NOTE ADULT - PROBLEM SELECTOR PROBLEM 10
PT, Kole, notified writer of patient's orthostatic BP. Notified MD. Told CNA that patient should not get up and push fluids.. New order to apply bilateral VINNIE stockings, abdominal binder, and push fluids. Continue to monitor.   Prophylactic measure Transition of care performed with sharing of clinical summary

## 2023-12-29 NOTE — PROGRESS NOTE ADULT - PROBLEM SELECTOR PLAN 1
Detail Level: Detailed ESRD TTS schedule for now with right chest wall tunnel HD catheter.    May not be beneficial for AVF placement given current comorbidities  Is having a difficult time tolerating HD due to advanced illness and hemodynamic instability on HD, even with minimal ultrafiltration being performed.     As per the palliative care discussions, will perform final HD on Saturday and then evaluate daily based on his goals of care   In light of this and advanced

## 2024-04-04 NOTE — PATIENT PROFILE ADULT. - NS SC CAGE ALCOHOL EYE OPENER
Patient: Chon oCley    no

## 2024-07-30 NOTE — PROGRESS NOTE ADULT - PROBLEM SELECTOR PLAN 2
[facility] HISTORY AND PHYSICAL  Date: 2024   Patient Name: Isha Llanes  : 1965  MRN: [id number]  Primary Care Physician:  Katia Wright MD  Date of admission: 2024    Subjective   Subjective     Chief Complaint: Difficulty swallowing    HPI:    Isha Llanes is a 58 y.o. 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  Patient states that she has been having difficulty swallowing food in the throat that started few days ago and has been progressively worsening to the point where she was not able to eat much.  Denies any pain during swallowing.  Denies any fevers, cough, chills, chest pain, difficulty breathing, nausea, vomiting.    In the ED, vital signs temperature 98.1, pulse 90, respiratory 20, blood pressure 123/87 on room air saturating around 94%.  CMP with no significant findings, normal CBC, urinalysis showed too numerous to count WBC, 3+ bacteria.  Urine cultures in process.  Chest x-ray showed no acute abnormality.  Received ceftriaxone and IV fluids in the ED.  Patient has been admitted for further evaluation and management of difficulty swallowing, poor oral intake, UTI    Personal History     Past Medical History:   Diagnosis Date    Anesthesia     REPORTS HAS GOT REAL EMOTIONAL AND HAS BECOME ANGRY BEFORE    Anxiety     Aortic stenosis, severe     HAS BIO VALVE REPLACED    Arthritis     Asthma     Back pain     Blood clotting disorder     Cancer     Cancer associated pain     HODGKINS LYMPHOMA    CHF (congestive heart failure)     Chronic low back pain with  sciatica     Chronic pain disorder     COPD (chronic obstructive pulmonary disease)     Coronary artery disease     Diabetes mellitus     TYPE 2    Diabetes mellitus     Dyspnea on effort     GERD (gastroesophageal reflux disease)     H/O lumpectomy     H/O: hysterectomy     Hiatal hernia     History of degenerative disc disease     History of kidney stones     History of pulmonary embolus (PE)     History of transfusion     AS A CHILD NO TRANSFUSION REACTION    History of transfusion     Hodgkin's lymphoma     5/19/23  5 YEARS SINCE LAST CHEMO TX    Hypertension     Immobility     Liver disease     DENIES ANY CURRENT ISSUES    Lupus     Mitral valve prolapse     Nausea vomiting and diarrhea 3/7/2024    Panic disorder     Pelvic pain     Peripheral neuropathy     Personal history of DVT (deep vein thrombosis)     Presence of intrathecal pump     PTSD (post-traumatic stress disorder)     Sacroiliac joint disease     SI (sacroiliac) joint inflammation     Sleep apnea     Venous insufficiency          Past Surgical History:   Procedure Laterality Date    ABDOMINAL SURGERY      BACK SURGERY      SPINAL FUSION     BACK SURGERY      BLADDER REPAIR      CARDIAC CATHETERIZATION N/A 03/06/2019    Procedure: Valvuloplasty;  Surgeon: Wayne Johnson MD;  Location: [facility] INVASIVE LOCATION;  Service: Cardiology    CARDIAC CATHETERIZATION      CARDIAC CATHETERIZATION Left 5/31/2023    Procedure: Cardiac Catheterization/Vascular Study;  Surgeon: Poncho Reid MD;  Location: [facility] CATH INVASIVE LOCATION;  Service: Cardiovascular;  Laterality: Left;    CARDIAC SURGERY      CARDIAC VALVE REPLACEMENT  2021    CHOLECYSTECTOMY      COLONOSCOPY N/A 12/29/2021    Procedure: COLONOSCOPY;  Surgeon: Karine Orlando MD;  Location: [facility] ENDOSCOPY;  Service: Gastroenterology;  Laterality: N/A;  POOR PREP    COLONOSCOPY N/A 04/13/2022    Procedure: COLONOSCOPY WITH POLYPECTOMY;  Surgeon: Karine Orlando,  MD;  Location: [facility] ENDOSCOPY;  Service: Gastroenterology;  Laterality: N/A;  COLON POLYP, HEMORRHOIDS, POOR PREP    COLONOSCOPY      DIRECT LARYNGOSCOPY, ESOPHAGOSCOPY, BRONCHOSCOPY N/A 5/22/2023    Procedure: DIRECT LARYNGOSCOPY WITH BIOPSIES , ESOPHAGOSCOPY, BRONCHOSCOPY;  Surgeon: Ronnie Mcgarry MD;  Location: [facility] OR [facility];  Service: ENT;  Laterality: N/A;    ENDOSCOPY N/A 12/29/2021    Procedure: ESOPHAGOGASTRODUODENOSCOPY;  Surgeon: Karine Orlando MD;  Location: [facility] ENDOSCOPY;  Service: Gastroenterology;  Laterality: N/A;  ESOPHAGITIS, GASTRITIS, HIATAL HERNIA    ENDOSCOPY      ENDOSCOPY N/A 4/16/2024    Procedure: ESOPHAGOGASTRODUODENOSCOPY WITH BIOPSIES;  Surgeon: Karine Orlando MD;  Location: [facility] ENDOSCOPY;  Service: Gastroenterology;  Laterality: N/A;  ESOPHAGITIS, HIATAL HERNIA    HYSTERECTOMY      LYMPHADENECTOMY      PAIN PUMP INSERTION/REVISION N/A 10/18/2019    Procedure: PAIN PUMP INSERTION [facility] 10-18-19 [place];  Surgeon: Horace Rios MD;  Location: [facility];  Service: Pain Management    PAIN PUMP INSERTION/REVISION N/A 11/23/2020    Procedure: pain pump removal;  Surgeon: Horace Rios MD;  Location: [facility];  Service: Pain Management;  Laterality: N/A;    PEG TUBE INSERTION N/A 7/26/2023    Procedure: PERCUTANEOUS ENDOSCOPIC GASTROSTOMY TUBE INSERTION;  Surgeon: Kody Mercer MD;  Location: [facility] ENDOSCOPY;  Service: General;  Laterality: N/A;  SUCCESSFUL PEG TUBE PLACE PLACEMENT    PORTACATH PLACEMENT      IN LEFT CHEST REPORTS THAT IT IS NOT FUNCTIONING    SKIN BIOPSY      TONSILLECTOMY      TUBAL ABDOMINAL LIGATION      TUMOR REMOVAL           Family History   Problem Relation Age of Onset    Heart failure Mother     Anxiety disorder Mother     Prostate cancer Father     Depression Sister     Bipolar disorder Sister     Pancreatic cancer Sister     ADD / ADHD Brother     Thyroid cancer Maternal Grandmother     Pancreatic cancer  Paternal Grandmother     ADD / ADHD Grandson     ADD / ADHD Granddaughter     ADD / ADHD Nephew     Malig Hyperthermia Neg Hx          Social History     Socioeconomic History    Marital status: Legally    Tobacco Use    Smoking status: Every Day     Current packs/day: 1.50     Average packs/day: 1.5 packs/day for 45.6 years (68.4 ttl pk-yrs)     Types: Cigarettes     Start date: 1979     Passive exposure: Current    Smokeless tobacco: Never   Vaping Use    Vaping status: Never Used   Substance and Sexual Activity    Alcohol use: Never    Drug use: Never    Sexual activity: Not Currently         Home Medications:  ALPRAZolam, Lancets 28G, O2, acetaminophen, albuterol, albuterol sulfate HFA, azithromycin, bacitracin, cetirizine, clopidogrel, famotidine, furosemide, glucose blood, glucose monitor, megestrol, mupirocin, nicotine polacrilex, ondansetron ODT, pain, and pantoprazole    Allergies:  Allergies   Allergen Reactions    Amoxicillin Shortness Of Breath     Has tolerated Cefazolin, Ceftriaxone, and Cefepime -[facility]    Ceclor [Cefaclor] Shortness Of Breath     Has tolerated Cefazolin, Ceftriaxone, and Cefepime -[facility]      Penicillins Shortness Of Breath     Has tolerated Cefazolin, Ceftriaxone, and Cefepime -[facility]    Sulfa Antibiotics Rash    Valium [Diazepam] Mental Status Change     DEPRESSED    Ambien [Zolpidem] Mental Status Change    Aspirin GI Intolerance    Ativan [Lorazepam] Mental Status Change    Benadryl [Diphenhydramine] Anxiety     AND MAKES HER HYPER    Biaxin [Clarithromycin] Unknown - Low Severity    Cephalexin Unknown - Low Severity    Clindamycin Unknown - Low Severity    Compazine [Prochlorperazine Edisylate] Rash    Contrast Dye (Echo Or Unknown Ct/Mr) Other (See Comments)     Caused pain in her arm    Doxycycline Rash    Nsaids GI Intolerance    Phenergan [Promethazine Hcl] GI Intolerance    Promethazine GI Intolerance    Vancomycin  Itching       Review of Systems   All other systems reviewed and negative except as mentioned above in the HPI    Objective   Objective     Vitals:   Temp:  [98.1 °F (36.7 °C)] 98.1 °F (36.7 °C)  Heart Rate:  [92-98] 92  Resp:  [20] 20  BP: (123)/(87) 123/87    Physical Exam    Constitutional: Awake, alert, no acute distress   Eyes: Pupils equal, sclerae anicteric, no conjunctival injection   HENT: NCAT, mucous membranes dry, noted to have some whitish plaques inside the throat concerning for thrush, hoarse voice(chronic)   Neck: Supple, no thyromegaly, no lymphadenopathy, trachea midline   Respiratory: Clear to auscultation bilaterally, nonlabored respirations    Cardiovascular: RRR, no murmurs, rubs, or gallops, palpable pedal pulses bilaterally   Gastrointestinal: Positive bowel sounds, soft, nontender, nondistended   Musculoskeletal: 1+ bilateral lower extremity edema, no clubbing or cyanosis to extremities   Neurologic: Oriented x 3, hoarse voice    Result Review    Result Review:  I have personally reviewed the results from the time of this admission to 7/30/2024 06:29 EDT and agree with these findings:  [x]  Laboratory  []  Microbiology  [x]  Radiology  []  EKG/Telemetry   []  Cardiology/Vascular   []  Pathology  []  Old records  []  Other:        Imaging Results (Last 24 Hours)       Procedure Component Value Units Date/Time    XR Chest 1 View [[id number]] Collected: 07/30/24 0401     Updated: 07/30/24 0406    Narrative:      XR CHEST 1 VW-     Date of exam: 7/30/2024, 3:28 A.M.     Indication: dyspnea     Comparison: 7/20/2024.     FINDINGS:  A single AP (or PA) upright portable chest radiograph was performed. No  cardiac enlargement is seen. No acute infiltrate is appreciated.  Improved aeration of the lungs is suggested bilaterally since the prior  exam. No pleural effusion or pneumothorax is identified. The patient has  undergone transcatheter aortic valve replacement (TAVR), as before.  Chronic calcified  granulomatous disease involves the chest. The thoracic  aorta is atherosclerotic and ectatic. There is no significant change in  the left IJ central venous line with its distal tip in the expected  lower superior vena cava (SVC). Degenerative changes involve the  shoulders. Surgical clips are projected over the left shoulder girdle,  seen previously. No significant interval change is seen since the prior  study (or studies).       Impression:      No acute infiltrate is appreciated.           Please note that portions of this note were completed with a voice  recognition program.        Electronically Signed By-Sony Hood MD On:7/30/2024 4:04 AM                        Assessment & Plan   Assessment / Plan     Assessment/Plan:   Difficulty swallowing  Poor oral intake  Oral thrush  UTI  Squamous cell carcinoma of the supraglottis, glottis s/p radiation therapy  Lupus on chronic prednisone  COPD  CAD  HFpEF  Hypertension  Chronic pain on Dilaudid pump  Type 2 diabetes mellitus  Aortic stenosis s/p TAVR  Anxiety    Plan  Admit to observation, remote telemetry  Continue ceftriaxone  Follow-up on urine cultures  Nystatin swish and swallow  SLP eval  Hypoglycemic protocol  Low-dose sliding scale insulin  Brovana  DuoNebs every 6 hours as needed  Bronchodilator protocol  Follow-up on BNP levels  Resume other appropriate home medications once reconciled and cleared for p.o. intake    VTE Prophylaxis:  Pharmacologic VTE prophylaxis orders are signed & held.      CODE STATUS:    Level Of Support Discussed With: Patient  Code Status (Patient has no pulse and is not breathing): CPR (Attempt to Resuscitate)  Medical Interventions (Patient has pulse or is breathing): Full Support    Admission Status:  I believe this patient meets observation status.    Part of this note may be an electronic transcription/translation of spoken language to printed text using the Dragon Dictation System    Rach Saeed MD              Acute respiratory failure in setting of septic and cardiogenic shock, with trach placed on 4/5/18 for persistent, now chronic respiratory failure. CXR showing effusions. On daily HD.   - Continues to tolerate HD w/o albumin (cannot go to LTAC if req. albumin)  - Intermittent tachypnea likely a component of pain, Dilaudid 0.25mg IV PRN  - ENT to replace trach before patient is accepted to [facility]

## 2024-10-10 NOTE — PROGRESS NOTE ADULT - ASSESSMENT
63M PMH HFrEF 10-15% (ischemic), MI, s/p AICD vs PPM, possible Afib, HTN, DM2 on insulin, possible CKD, and gout, who presented with a chief complaint of generalized weakness (3/10/2018) s/p septic shock likely 2/2 LE cellulitis complicated by ATN requiring dialysis. Course complicated by AMS likely from brainstem infarct 2/2 LV thrombus vs toxic metabolic encephalopathy. Further complicated by development of candidemia and sepsis 2/2 klebsiella bacteremia s/p fluconazole and CTX, now resolved. s/p completed course of Zosyn for aspiration PNA.  Goals of care discussion for which patient made DNR, but with continued escalation of care. Continues to undergo management for respiratory failure with weaning as tolerated with CPAP, trach collar trials. With complications of hypotension, tolerating HD w/o albumin. Stable and ready for SD to [facility] for continued vent weaning prior to d/c to long term care facility with HD+vent weaning. No

## 2024-11-11 NOTE — BEHAVIORAL HEALTH ASSESSMENT NOTE - NICOTINE
Care Due:                  Date            Visit Type   Department     Provider  --------------------------------------------------------------------------------                                EP -                              PRIMARY      [facility]  Last Visit: 12-      CARE ([facility])   MEDICINE       Charles Nash  Next Visit: None Scheduled  None         None Found                                                            Last  Test          Frequency    Reason                     Performed    Due Date  --------------------------------------------------------------------------------    HBA1C.......  6 months...  glimepiride, metFORMIN...  06- 12-    [facility] Embedded Care Due Messages. Reference number: [id number].   11/09/2024 6:04:13 AM CST  
Refill Routing Note   Medication(s) are not appropriate for processing by [facility] for the following reason(s):        Drug-disease interaction    [facility] action(s):  Approve  Defer        Medication Therapy Plan: FLOS;  hydroCHLOROthiazide and Hypokalemia    Extended chart review required: Yes     Appointments  past 12m or future 3m with PCP    Date Provider   Last Visit   12/7/2023 Charles Nash MD   Next Visit   Visit date not found Charles Nash MD   ED visits in past 90 days: 0        Note composed:9:34 AM 11/11/2024           
None known

## 2024-12-04 NOTE — PROGRESS NOTE ADULT - PROBLEM SELECTOR PLAN 10
General VTE: Coumadin dosed nightly, with INR daily checks, goal 2-3  GI PPx: PPI  DNR- Made DNR, family wants escalation of care, pressors if needed.  F: No IVF in setting of HD  E: Replete electrolytes with caution in setting of HD  N: Jevity 1.5 goal rate 62cc/hr, per updated nutrition recs 5/21  Dispo: SD to [facility], likely chronic vent facility + HD, pending SW placement. Dispo plans and SD plans discussed with daughter Cristal

## 2024-12-29 NOTE — PROGRESS NOTE ADULT - ASSESSMENT
Pt picked up by transport for IR.   63M PMH HFrEF 10-15% (ischemic), MI, s/p AICD vs PPM, possible Afib, HTN, DM2 on insulin, possible CKD, and gout, who presented with a chief complaint of generalized weakness (3/10/2018) s/p septic shock likely 2/2 LE cellulitis complicated by ATN requiring dialysis. Course complicated by AMS likely from brainstem infarct 2/2 LV thrombus vs toxic metabolic encephalopathy. Further complicated by development of candidemia and sepsis 2/2 klebsiella bacteremia s/p fluconazole and CTX, now resolved. s/p completed course of Zosyn for aspiration PNA.  Goals of care discussion for which patient made DNR, but with continued escalation of care. Continues to undergo management for respiratory failure with weaning as tolerated with CPAP, trach collar trials. With complications of hypotension, tolerating HD w/o albumin. Now with unsteageable incurable sacral wound and candidal UTI with uptrending WBC, on Zosyn. Plan for family meeting to address antibiotic therapy and placement.

## 2025-02-11 NOTE — PROGRESS NOTE ADULT - NSHPATTENDINGPLANDISCUSS_GEN_ALL_CORE
Pt is to be called for condition update post left heart catheterization on 02/20/2025.    Are you having any pain, redness or swelling at your puncture site?    Do you have a scheduled follow up visit in cardiology?     Do you have any questions or concerns at this time?        wife

## 2025-02-18 NOTE — PROGRESS NOTE ADULT - PROBLEM SELECTOR PLAN 4
Called pt to remind them about upcoming apt and outstanding labs needed for next visit.    Stable. Persistently hypotensive throughout admission, now on midodrine 15 mg TID and hydrocortisone 10mg BID for concern of adrenal insufficiency. Has intermittently used albumin to tolerate dialysis.   - c/w Midodrine 10 mg q8h and additional 10 midodrine pre-HD  - hydrocortisone 10mg BID

## 2025-03-07 NOTE — PROGRESS NOTE ADULT - PROBLEM SELECTOR PLAN 2
3/7/2025 11:29 AM  *  Tablet Messaging Message sent to patient via Patient Connect Portal for Remote Patient Monitoring     RPM Nurse message No readings in two days Hello, Please see an important message from your RPM Team:  This is a reminder that we have not received your readings for two days please enter them as soon as possible.     Please do not respond to this message; If you have a question or concern please call your Ambulatory Care Manager or your Primary Care Physician.                    Acute on chronic systolic CHF with LVEF 15 % and severely low blood pressure  Plan per cardiology/primary ICU team for inotropic support and IV pressors for now.  Fluid restriction to <1L/day with concentration of IV drips if possible  Daily weight  Strict I/o monitoring.  Consider Cardiology evaluation for severe cardiomyopathy.

## 2025-03-25 NOTE — OCCUPATIONAL THERAPY INITIAL EVALUATION ADULT - REHAB POTENTIAL, OT EVAL
Patient here at the clinic today to receive his monthly Abilify Maintena 400mg for Mood disorder with depressive features      Patient identified by full name and  and vitals obtained. patient last seen by provider 3/19/25 , last seen by nurse on 25  Medication verified by providers note and medication order       Appetite: no change  Sleep: No change  Appearance: clean, age appropriate, well-groomed  Build: average  Attitude: cooperative, calm, pleasant, friendly, open  Eye Contact: normal  Activity: alert, attentive, appropriate  Speech: appropriate & spontaneous, normal  Delusions: patient denies   Thought Content: logical  Thought Process: logical  Judgement/insight: Fair  Mood: calm happy  Affect: appropriate  AH/VH/SI/HI: patient denies  Access to firearms/weapons: No  Depression: patient denies  Thoughts of hopelessness: Patient denies  Anxiety: patient denies  Self-injurious behavior: patient denies  Cravings/Urges: NA  Last use: NA  Tobacco Use: non-smoker  Spiritual or cultural practices that may affect your care or impact your health care decisions: no  Living situation lives with mom whom is his guardian   Employed: No        Patient here at the clinic today to receive his monthly Abilify Maintena for Mood disorder accompanied by his mother. Patient was cooperative and engaged during this visit. Per mom there has been no issues with previous injection and denies any issues with SI/HI, VH/AH, depression and no recent hospitalizations      Patient received injection of Abilify Maintena 400mg/2ml via 23 gauge w/o incident into right   deltoid area. Patient tolerated injection well, no reaction at injection site.     RN provided education on medications sided effects, the importance of reporting any changes in the injection site, for minor arm pain utilization of ibuprofen and a warm compress should relive any discomfort.  RN has provided patient and mom direct office number for future questions and  the mobile crisis number for any emergent concerns that may arise.      LOT # [id number]  EXP:  AUG 2027  NDC: 25549-664-67        Patient will RTC in 4 weeks     VIANNEY SiddiquiN          fair, will monitor progress closely
